# Patient Record
Sex: FEMALE | Race: WHITE | NOT HISPANIC OR LATINO | Employment: UNEMPLOYED | URBAN - METROPOLITAN AREA
[De-identification: names, ages, dates, MRNs, and addresses within clinical notes are randomized per-mention and may not be internally consistent; named-entity substitution may affect disease eponyms.]

---

## 2022-01-04 PROCEDURE — 87106 FUNGI IDENTIFICATION YEAST: CPT | Performed by: DENTIST

## 2022-01-04 PROCEDURE — 87205 SMEAR GRAM STAIN: CPT | Performed by: DENTIST

## 2022-01-05 ENCOUNTER — LAB REQUISITION (OUTPATIENT)
Dept: LAB | Facility: CLINIC | Age: 57
End: 2022-01-05

## 2022-01-05 DIAGNOSIS — R68.84 JAW PAIN: ICD-10-CM

## 2022-01-05 LAB
GRAM STAIN RESULT: NORMAL
GRAM STAIN RESULT: NORMAL

## 2022-01-08 LAB
BACTERIA SPEC CULT: ABNORMAL
BACTERIA SPEC CULT: ABNORMAL

## 2022-01-10 ENCOUNTER — HOSPITAL ENCOUNTER (OUTPATIENT)
Facility: CLINIC | Age: 57
Setting detail: OBSERVATION
Discharge: HOME | End: 2022-01-11
Attending: EMERGENCY MEDICINE | Admitting: DENTIST
Payer: COMMERCIAL

## 2022-01-10 ENCOUNTER — APPOINTMENT (OUTPATIENT)
Dept: CT IMAGING | Facility: CLINIC | Age: 57
End: 2022-01-10
Attending: EMERGENCY MEDICINE
Payer: COMMERCIAL

## 2022-01-10 ENCOUNTER — ANESTHESIA (OUTPATIENT)
Dept: SURGERY | Facility: CLINIC | Age: 57
End: 2022-01-10
Payer: COMMERCIAL

## 2022-01-10 ENCOUNTER — ANESTHESIA EVENT (OUTPATIENT)
Dept: SURGERY | Facility: CLINIC | Age: 57
End: 2022-01-10
Payer: COMMERCIAL

## 2022-01-10 DIAGNOSIS — Z98.890 STATUS POST INCISION AND DRAINAGE: Primary | ICD-10-CM

## 2022-01-10 DIAGNOSIS — Z20.822 LAB TEST NEGATIVE FOR COVID-19 VIRUS: ICD-10-CM

## 2022-01-10 DIAGNOSIS — M27.2 ABSCESS OF LEFT JAW: ICD-10-CM

## 2022-01-10 LAB
ALBUMIN SERPL-MCNC: 3 G/DL (ref 3.4–5)
ALP SERPL-CCNC: 107 U/L (ref 40–150)
ALT SERPL W P-5'-P-CCNC: 20 U/L (ref 0–50)
ANION GAP SERPL CALCULATED.3IONS-SCNC: 8 MMOL/L (ref 3–14)
AST SERPL W P-5'-P-CCNC: 14 U/L (ref 0–45)
BASOPHILS # BLD AUTO: 0 10E3/UL (ref 0–0.2)
BASOPHILS NFR BLD AUTO: 0 %
BILIRUB SERPL-MCNC: 0.3 MG/DL (ref 0.2–1.3)
BUN SERPL-MCNC: 7 MG/DL (ref 7–30)
CALCIUM SERPL-MCNC: 9.2 MG/DL (ref 8.5–10.1)
CHLORIDE BLD-SCNC: 99 MMOL/L (ref 94–109)
CO2 SERPL-SCNC: 20 MMOL/L (ref 20–32)
CREAT SERPL-MCNC: 0.47 MG/DL (ref 0.52–1.04)
CRP SERPL-MCNC: 110 MG/L (ref 0–8)
EOSINOPHIL # BLD AUTO: 0.1 10E3/UL (ref 0–0.7)
EOSINOPHIL NFR BLD AUTO: 1 %
ERYTHROCYTE [DISTWIDTH] IN BLOOD BY AUTOMATED COUNT: 13.4 % (ref 10–15)
GFR SERPL CREATININE-BSD FRML MDRD: >90 ML/MIN/1.73M2
GLUCOSE BLD-MCNC: 105 MG/DL (ref 70–99)
GLUCOSE BLDC GLUCOMTR-MCNC: 94 MG/DL (ref 70–99)
GRAM STAIN RESULT: ABNORMAL
GRAM STAIN RESULT: ABNORMAL
HCT VFR BLD AUTO: 44 % (ref 35–47)
HGB BLD-MCNC: 14.9 G/DL (ref 11.7–15.7)
HOLD SPECIMEN: NORMAL
HOLD SPECIMEN: NORMAL
IMM GRANULOCYTES # BLD: 0.1 10E3/UL
IMM GRANULOCYTES NFR BLD: 1 %
LACTATE SERPL-SCNC: 1.2 MMOL/L (ref 0.7–2)
LYMPHOCYTES # BLD AUTO: 0.9 10E3/UL (ref 0.8–5.3)
LYMPHOCYTES NFR BLD AUTO: 5 %
MCH RBC QN AUTO: 34.5 PG (ref 26.5–33)
MCHC RBC AUTO-ENTMCNC: 33.9 G/DL (ref 31.5–36.5)
MCV RBC AUTO: 102 FL (ref 78–100)
MONOCYTES # BLD AUTO: 1.2 10E3/UL (ref 0–1.3)
MONOCYTES NFR BLD AUTO: 7 %
NEUTROPHILS # BLD AUTO: 13.8 10E3/UL (ref 1.6–8.3)
NEUTROPHILS NFR BLD AUTO: 86 %
NRBC # BLD AUTO: 0 10E3/UL
NRBC BLD AUTO-RTO: 0 /100
PLATELET # BLD AUTO: 306 10E3/UL (ref 150–450)
POTASSIUM BLD-SCNC: 3.7 MMOL/L (ref 3.4–5.3)
PROT SERPL-MCNC: 7.9 G/DL (ref 6.8–8.8)
RADIOLOGIST FLAGS: ABNORMAL
RBC # BLD AUTO: 4.32 10E6/UL (ref 3.8–5.2)
SARS-COV-2 RNA RESP QL NAA+PROBE: NEGATIVE
SODIUM SERPL-SCNC: 127 MMOL/L (ref 133–144)
WBC # BLD AUTO: 16.1 10E3/UL (ref 4–11)

## 2022-01-10 PROCEDURE — 250N000025 HC SEVOFLURANE, PER MIN: Performed by: DENTIST

## 2022-01-10 PROCEDURE — 250N000013 HC RX MED GY IP 250 OP 250 PS 637: Performed by: ANESTHESIOLOGY

## 2022-01-10 PROCEDURE — 87077 CULTURE AEROBIC IDENTIFY: CPT | Performed by: DENTIST

## 2022-01-10 PROCEDURE — 99285 EMERGENCY DEPT VISIT HI MDM: CPT | Performed by: EMERGENCY MEDICINE

## 2022-01-10 PROCEDURE — 87102 FUNGUS ISOLATION CULTURE: CPT | Performed by: DENTIST

## 2022-01-10 PROCEDURE — 87205 SMEAR GRAM STAIN: CPT | Performed by: DENTIST

## 2022-01-10 PROCEDURE — 80053 COMPREHEN METABOLIC PANEL: CPT | Performed by: EMERGENCY MEDICINE

## 2022-01-10 PROCEDURE — 96375 TX/PRO/DX INJ NEW DRUG ADDON: CPT | Mod: 59 | Performed by: EMERGENCY MEDICINE

## 2022-01-10 PROCEDURE — 250N000009 HC RX 250: Performed by: NURSE ANESTHETIST, CERTIFIED REGISTERED

## 2022-01-10 PROCEDURE — 96361 HYDRATE IV INFUSION ADD-ON: CPT | Mod: 59 | Performed by: EMERGENCY MEDICINE

## 2022-01-10 PROCEDURE — 250N000011 HC RX IP 250 OP 636: Performed by: INTERNAL MEDICINE

## 2022-01-10 PROCEDURE — 96366 THER/PROPH/DIAG IV INF ADDON: CPT | Performed by: EMERGENCY MEDICINE

## 2022-01-10 PROCEDURE — 70491 CT SOFT TISSUE NECK W/DYE: CPT | Mod: 26 | Performed by: RADIOLOGY

## 2022-01-10 PROCEDURE — 99285 EMERGENCY DEPT VISIT HI MDM: CPT | Mod: 25 | Performed by: EMERGENCY MEDICINE

## 2022-01-10 PROCEDURE — 87070 CULTURE OTHR SPECIMN AEROBIC: CPT | Performed by: DENTIST

## 2022-01-10 PROCEDURE — 272N000001 HC OR GENERAL SUPPLY STERILE: Performed by: DENTIST

## 2022-01-10 PROCEDURE — 258N000003 HC RX IP 258 OP 636: Performed by: EMERGENCY MEDICINE

## 2022-01-10 PROCEDURE — 96365 THER/PROPH/DIAG IV INF INIT: CPT | Mod: 59 | Performed by: EMERGENCY MEDICINE

## 2022-01-10 PROCEDURE — 86140 C-REACTIVE PROTEIN: CPT | Performed by: EMERGENCY MEDICINE

## 2022-01-10 PROCEDURE — 36415 COLL VENOUS BLD VENIPUNCTURE: CPT | Performed by: EMERGENCY MEDICINE

## 2022-01-10 PROCEDURE — 93005 ELECTROCARDIOGRAM TRACING: CPT

## 2022-01-10 PROCEDURE — 70491 CT SOFT TISSUE NECK W/DYE: CPT

## 2022-01-10 PROCEDURE — 250N000011 HC RX IP 250 OP 636

## 2022-01-10 PROCEDURE — 710N000010 HC RECOVERY PHASE 1, LEVEL 2, PER MIN: Performed by: DENTIST

## 2022-01-10 PROCEDURE — 250N000011 HC RX IP 250 OP 636: Performed by: EMERGENCY MEDICINE

## 2022-01-10 PROCEDURE — 360N000075 HC SURGERY LEVEL 2, PER MIN: Performed by: DENTIST

## 2022-01-10 PROCEDURE — 999N000054 HC STATISTIC EKG NON-CHARGEABLE

## 2022-01-10 PROCEDURE — 99220 PR INITIAL OBSERVATION CARE,LEVEL III: CPT | Mod: AI | Performed by: FAMILY MEDICINE

## 2022-01-10 PROCEDURE — 87185 SC STD ENZYME DETCJ PER NZM: CPT | Performed by: DENTIST

## 2022-01-10 PROCEDURE — 370N000017 HC ANESTHESIA TECHNICAL FEE, PER MIN: Performed by: DENTIST

## 2022-01-10 PROCEDURE — 250N000011 HC RX IP 250 OP 636: Performed by: ANESTHESIOLOGY

## 2022-01-10 PROCEDURE — 83605 ASSAY OF LACTIC ACID: CPT | Performed by: EMERGENCY MEDICINE

## 2022-01-10 PROCEDURE — 93010 ELECTROCARDIOGRAM REPORT: CPT | Mod: 59 | Performed by: INTERNAL MEDICINE

## 2022-01-10 PROCEDURE — 96376 TX/PRO/DX INJ SAME DRUG ADON: CPT | Performed by: EMERGENCY MEDICINE

## 2022-01-10 PROCEDURE — U0003 INFECTIOUS AGENT DETECTION BY NUCLEIC ACID (DNA OR RNA); SEVERE ACUTE RESPIRATORY SYNDROME CORONAVIRUS 2 (SARS-COV-2) (CORONAVIRUS DISEASE [COVID-19]), AMPLIFIED PROBE TECHNIQUE, MAKING USE OF HIGH THROUGHPUT TECHNOLOGIES AS DESCRIBED BY CMS-2020-01-R: HCPCS | Performed by: EMERGENCY MEDICINE

## 2022-01-10 PROCEDURE — 87040 BLOOD CULTURE FOR BACTERIA: CPT | Mod: XS | Performed by: EMERGENCY MEDICINE

## 2022-01-10 PROCEDURE — C9803 HOPD COVID-19 SPEC COLLECT: HCPCS | Performed by: EMERGENCY MEDICINE

## 2022-01-10 PROCEDURE — 250N000011 HC RX IP 250 OP 636: Performed by: NURSE ANESTHETIST, CERTIFIED REGISTERED

## 2022-01-10 PROCEDURE — 999N000141 HC STATISTIC PRE-PROCEDURE NURSING ASSESSMENT: Performed by: DENTIST

## 2022-01-10 PROCEDURE — 120N000002 HC R&B MED SURG/OB UMMC

## 2022-01-10 PROCEDURE — 85025 COMPLETE CBC W/AUTO DIFF WBC: CPT | Performed by: EMERGENCY MEDICINE

## 2022-01-10 PROCEDURE — 82040 ASSAY OF SERUM ALBUMIN: CPT | Performed by: EMERGENCY MEDICINE

## 2022-01-10 PROCEDURE — 250N000009 HC RX 250: Performed by: DENTIST

## 2022-01-10 PROCEDURE — 258N000003 HC RX IP 258 OP 636: Performed by: NURSE ANESTHETIST, CERTIFIED REGISTERED

## 2022-01-10 RX ORDER — CEPHALEXIN 500 MG/1
500 CAPSULE ORAL 4 TIMES DAILY
Status: ON HOLD | COMMUNITY
End: 2022-01-11

## 2022-01-10 RX ORDER — IOPAMIDOL 755 MG/ML
100 INJECTION, SOLUTION INTRAVASCULAR ONCE
Status: COMPLETED | OUTPATIENT
Start: 2022-01-10 | End: 2022-01-10

## 2022-01-10 RX ORDER — SODIUM CHLORIDE 9 MG/ML
INJECTION, SOLUTION INTRAVENOUS CONTINUOUS
Status: DISCONTINUED | OUTPATIENT
Start: 2022-01-10 | End: 2022-01-11

## 2022-01-10 RX ORDER — PROPOFOL 10 MG/ML
INJECTION, EMULSION INTRAVENOUS PRN
Status: DISCONTINUED | OUTPATIENT
Start: 2022-01-10 | End: 2022-01-10

## 2022-01-10 RX ORDER — ONDANSETRON 2 MG/ML
4 INJECTION INTRAMUSCULAR; INTRAVENOUS EVERY 30 MIN PRN
Status: DISCONTINUED | OUTPATIENT
Start: 2022-01-10 | End: 2022-01-10

## 2022-01-10 RX ORDER — AMOXICILLIN 250 MG
2 CAPSULE ORAL 2 TIMES DAILY PRN
Status: DISCONTINUED | OUTPATIENT
Start: 2022-01-10 | End: 2022-01-11 | Stop reason: HOSPADM

## 2022-01-10 RX ORDER — ACETAMINOPHEN 325 MG/1
975 TABLET ORAL ONCE
Status: COMPLETED | OUTPATIENT
Start: 2022-01-10 | End: 2022-01-10

## 2022-01-10 RX ORDER — FENTANYL CITRATE 50 UG/ML
25 INJECTION, SOLUTION INTRAMUSCULAR; INTRAVENOUS EVERY 5 MIN PRN
Status: DISCONTINUED | OUTPATIENT
Start: 2022-01-10 | End: 2022-01-10 | Stop reason: HOSPADM

## 2022-01-10 RX ORDER — ONDANSETRON 4 MG/1
4 TABLET, ORALLY DISINTEGRATING ORAL EVERY 6 HOURS PRN
Status: DISCONTINUED | OUTPATIENT
Start: 2022-01-10 | End: 2022-01-11 | Stop reason: HOSPADM

## 2022-01-10 RX ORDER — SODIUM CHLORIDE, SODIUM LACTATE, POTASSIUM CHLORIDE, CALCIUM CHLORIDE 600; 310; 30; 20 MG/100ML; MG/100ML; MG/100ML; MG/100ML
INJECTION, SOLUTION INTRAVENOUS CONTINUOUS
Status: DISCONTINUED | OUTPATIENT
Start: 2022-01-10 | End: 2022-01-10 | Stop reason: HOSPADM

## 2022-01-10 RX ORDER — FLUCONAZOLE 100 MG/1
100 TABLET ORAL DAILY
Status: DISCONTINUED | OUTPATIENT
Start: 2022-01-10 | End: 2022-01-11 | Stop reason: HOSPADM

## 2022-01-10 RX ORDER — ONDANSETRON 2 MG/ML
4 INJECTION INTRAMUSCULAR; INTRAVENOUS EVERY 30 MIN PRN
Status: DISCONTINUED | OUTPATIENT
Start: 2022-01-10 | End: 2022-01-10 | Stop reason: HOSPADM

## 2022-01-10 RX ORDER — HYDROMORPHONE HYDROCHLORIDE 1 MG/ML
0.5 INJECTION, SOLUTION INTRAMUSCULAR; INTRAVENOUS; SUBCUTANEOUS
Status: DISCONTINUED | OUTPATIENT
Start: 2022-01-10 | End: 2022-01-11 | Stop reason: HOSPADM

## 2022-01-10 RX ORDER — PAROXETINE 20 MG/1
20 TABLET, FILM COATED ORAL DAILY
Status: ON HOLD | COMMUNITY
Start: 2021-12-23 | End: 2022-12-02

## 2022-01-10 RX ORDER — ESMOLOL HYDROCHLORIDE 10 MG/ML
INJECTION INTRAVENOUS PRN
Status: DISCONTINUED | OUTPATIENT
Start: 2022-01-10 | End: 2022-01-10

## 2022-01-10 RX ORDER — LEVOTHYROXINE SODIUM 112 UG/1
112 TABLET ORAL DAILY
Status: DISCONTINUED | OUTPATIENT
Start: 2022-01-11 | End: 2022-01-11 | Stop reason: HOSPADM

## 2022-01-10 RX ORDER — OXYCODONE HYDROCHLORIDE 5 MG/1
5 TABLET ORAL EVERY 4 HOURS PRN
Status: DISCONTINUED | OUTPATIENT
Start: 2022-01-10 | End: 2022-01-10

## 2022-01-10 RX ORDER — ONDANSETRON 4 MG/1
4 TABLET, ORALLY DISINTEGRATING ORAL EVERY 30 MIN PRN
Status: DISCONTINUED | OUTPATIENT
Start: 2022-01-10 | End: 2022-01-10 | Stop reason: HOSPADM

## 2022-01-10 RX ORDER — HYDROMORPHONE HYDROCHLORIDE 1 MG/ML
0.5 INJECTION, SOLUTION INTRAMUSCULAR; INTRAVENOUS; SUBCUTANEOUS ONCE
Status: COMPLETED | OUTPATIENT
Start: 2022-01-10 | End: 2022-01-10

## 2022-01-10 RX ORDER — ACETAMINOPHEN 325 MG/1
975 TABLET ORAL EVERY 8 HOURS PRN
Status: DISCONTINUED | OUTPATIENT
Start: 2022-01-10 | End: 2022-01-10

## 2022-01-10 RX ORDER — PAROXETINE 10 MG/1
10 TABLET, FILM COATED ORAL DAILY
Status: DISCONTINUED | OUTPATIENT
Start: 2022-01-10 | End: 2022-01-11 | Stop reason: HOSPADM

## 2022-01-10 RX ORDER — HYDROMORPHONE HYDROCHLORIDE 1 MG/ML
0.5 INJECTION, SOLUTION INTRAMUSCULAR; INTRAVENOUS; SUBCUTANEOUS
Status: COMPLETED | OUTPATIENT
Start: 2022-01-10 | End: 2022-01-10

## 2022-01-10 RX ORDER — FLUCONAZOLE 100 MG/1
100 TABLET ORAL DAILY
Status: ON HOLD | COMMUNITY
End: 2022-01-11

## 2022-01-10 RX ORDER — SODIUM CHLORIDE, SODIUM LACTATE, POTASSIUM CHLORIDE, CALCIUM CHLORIDE 600; 310; 30; 20 MG/100ML; MG/100ML; MG/100ML; MG/100ML
INJECTION, SOLUTION INTRAVENOUS CONTINUOUS PRN
Status: DISCONTINUED | OUTPATIENT
Start: 2022-01-10 | End: 2022-01-10

## 2022-01-10 RX ORDER — ATENOLOL 25 MG/1
50 TABLET ORAL DAILY
Status: DISCONTINUED | OUTPATIENT
Start: 2022-01-10 | End: 2022-01-11 | Stop reason: HOSPADM

## 2022-01-10 RX ORDER — IBUPROFEN 200 MG
200 TABLET ORAL EVERY 4 HOURS PRN
COMMUNITY
End: 2022-04-02

## 2022-01-10 RX ORDER — ONDANSETRON 2 MG/ML
4 INJECTION INTRAMUSCULAR; INTRAVENOUS EVERY 6 HOURS PRN
Status: DISCONTINUED | OUTPATIENT
Start: 2022-01-10 | End: 2022-01-11 | Stop reason: HOSPADM

## 2022-01-10 RX ORDER — NICOTINE 21 MG/24HR
1 PATCH, TRANSDERMAL 24 HOURS TRANSDERMAL DAILY
Status: DISCONTINUED | OUTPATIENT
Start: 2022-01-10 | End: 2022-01-11 | Stop reason: HOSPADM

## 2022-01-10 RX ORDER — BUPIVACAINE HYDROCHLORIDE AND EPINEPHRINE 5; 5 MG/ML; UG/ML
INJECTION, SOLUTION PERINEURAL PRN
Status: DISCONTINUED | OUTPATIENT
Start: 2022-01-10 | End: 2022-01-10 | Stop reason: HOSPADM

## 2022-01-10 RX ORDER — CLINDAMYCIN PHOSPHATE 900 MG/50ML
900 INJECTION, SOLUTION INTRAVENOUS EVERY 8 HOURS
Status: DISCONTINUED | OUTPATIENT
Start: 2022-01-10 | End: 2022-01-11 | Stop reason: HOSPADM

## 2022-01-10 RX ORDER — FENTANYL CITRATE 50 UG/ML
INJECTION, SOLUTION INTRAMUSCULAR; INTRAVENOUS PRN
Status: DISCONTINUED | OUTPATIENT
Start: 2022-01-10 | End: 2022-01-10

## 2022-01-10 RX ORDER — AMOXICILLIN 250 MG
1 CAPSULE ORAL 2 TIMES DAILY PRN
Status: DISCONTINUED | OUTPATIENT
Start: 2022-01-10 | End: 2022-01-11 | Stop reason: HOSPADM

## 2022-01-10 RX ORDER — LIDOCAINE 40 MG/G
CREAM TOPICAL
Status: DISCONTINUED | OUTPATIENT
Start: 2022-01-10 | End: 2022-01-11 | Stop reason: HOSPADM

## 2022-01-10 RX ORDER — DEXAMETHASONE SODIUM PHOSPHATE 4 MG/ML
4 INJECTION, SOLUTION INTRA-ARTICULAR; INTRALESIONAL; INTRAMUSCULAR; INTRAVENOUS; SOFT TISSUE EVERY 6 HOURS
Status: DISCONTINUED | OUTPATIENT
Start: 2022-01-10 | End: 2022-01-11 | Stop reason: HOSPADM

## 2022-01-10 RX ORDER — HYDROMORPHONE HCL IN WATER/PF 6 MG/30 ML
0.2 PATIENT CONTROLLED ANALGESIA SYRINGE INTRAVENOUS EVERY 5 MIN PRN
Status: DISCONTINUED | OUTPATIENT
Start: 2022-01-10 | End: 2022-01-10 | Stop reason: HOSPADM

## 2022-01-10 RX ORDER — METRONIDAZOLE 500 MG/1
500 TABLET ORAL 4 TIMES DAILY
Status: ON HOLD | COMMUNITY
End: 2022-01-11

## 2022-01-10 RX ORDER — ONDANSETRON 2 MG/ML
INJECTION INTRAMUSCULAR; INTRAVENOUS PRN
Status: DISCONTINUED | OUTPATIENT
Start: 2022-01-10 | End: 2022-01-10

## 2022-01-10 RX ORDER — LEVOTHYROXINE SODIUM 112 UG/1
112 TABLET ORAL DAILY
COMMUNITY

## 2022-01-10 RX ORDER — FENTANYL CITRATE 50 UG/ML
25 INJECTION, SOLUTION INTRAMUSCULAR; INTRAVENOUS
Status: DISCONTINUED | OUTPATIENT
Start: 2022-01-10 | End: 2022-01-10 | Stop reason: HOSPADM

## 2022-01-10 RX ORDER — DEXAMETHASONE SODIUM PHOSPHATE 4 MG/ML
INJECTION, SOLUTION INTRA-ARTICULAR; INTRALESIONAL; INTRAMUSCULAR; INTRAVENOUS; SOFT TISSUE PRN
Status: DISCONTINUED | OUTPATIENT
Start: 2022-01-10 | End: 2022-01-10

## 2022-01-10 RX ORDER — OXYCODONE HYDROCHLORIDE 5 MG/1
5 TABLET ORAL EVERY 4 HOURS PRN
Status: DISCONTINUED | OUTPATIENT
Start: 2022-01-10 | End: 2022-01-10 | Stop reason: HOSPADM

## 2022-01-10 RX ORDER — ACETAMINOPHEN 325 MG/1
975 TABLET ORAL EVERY 8 HOURS
Status: DISCONTINUED | OUTPATIENT
Start: 2022-01-11 | End: 2022-01-11

## 2022-01-10 RX ORDER — LIDOCAINE HYDROCHLORIDE 20 MG/ML
INJECTION, SOLUTION INFILTRATION; PERINEURAL PRN
Status: DISCONTINUED | OUTPATIENT
Start: 2022-01-10 | End: 2022-01-10

## 2022-01-10 RX ORDER — ACETAMINOPHEN 500 MG
500-1000 TABLET ORAL EVERY 6 HOURS PRN
COMMUNITY
End: 2022-04-02

## 2022-01-10 RX ORDER — LIDOCAINE 40 MG/G
CREAM TOPICAL
Status: DISCONTINUED | OUTPATIENT
Start: 2022-01-10 | End: 2022-01-10 | Stop reason: HOSPADM

## 2022-01-10 RX ORDER — ATORVASTATIN CALCIUM 20 MG/1
20 TABLET, FILM COATED ORAL DAILY
Status: DISCONTINUED | OUTPATIENT
Start: 2022-01-10 | End: 2022-01-11 | Stop reason: HOSPADM

## 2022-01-10 RX ORDER — OXYCODONE HYDROCHLORIDE 5 MG/1
5 TABLET ORAL EVERY 4 HOURS PRN
Status: DISCONTINUED | OUTPATIENT
Start: 2022-01-10 | End: 2022-01-11 | Stop reason: HOSPADM

## 2022-01-10 RX ORDER — MEPERIDINE HYDROCHLORIDE 25 MG/ML
12.5 INJECTION INTRAMUSCULAR; INTRAVENOUS; SUBCUTANEOUS
Status: DISCONTINUED | OUTPATIENT
Start: 2022-01-10 | End: 2022-01-10 | Stop reason: HOSPADM

## 2022-01-10 RX ORDER — ATORVASTATIN CALCIUM 20 MG/1
20 TABLET, FILM COATED ORAL DAILY
COMMUNITY

## 2022-01-10 RX ORDER — ATENOLOL 50 MG/1
50 TABLET ORAL DAILY
Status: ON HOLD | COMMUNITY
End: 2022-04-02

## 2022-01-10 RX ADMIN — CLINDAMYCIN IN 5 PERCENT DEXTROSE 900 MG: 18 INJECTION, SOLUTION INTRAVENOUS at 14:33

## 2022-01-10 RX ADMIN — DEXAMETHASONE SODIUM PHOSPHATE 8 MG: 4 INJECTION, SOLUTION INTRA-ARTICULAR; INTRALESIONAL; INTRAMUSCULAR; INTRAVENOUS; SOFT TISSUE at 18:43

## 2022-01-10 RX ADMIN — CLINDAMYCIN IN 5 PERCENT DEXTROSE 900 MG: 18 INJECTION, SOLUTION INTRAVENOUS at 06:02

## 2022-01-10 RX ADMIN — ONDANSETRON 4 MG: 2 INJECTION INTRAMUSCULAR; INTRAVENOUS at 06:03

## 2022-01-10 RX ADMIN — SODIUM CHLORIDE: 9 INJECTION, SOLUTION INTRAVENOUS at 09:08

## 2022-01-10 RX ADMIN — PHENYLEPHRINE HYDROCHLORIDE 100 MCG: 10 INJECTION INTRAVENOUS at 18:44

## 2022-01-10 RX ADMIN — ROCURONIUM BROMIDE 50 MG: 50 INJECTION, SOLUTION INTRAVENOUS at 18:28

## 2022-01-10 RX ADMIN — FENTANYL CITRATE 25 MCG: 50 INJECTION INTRAMUSCULAR; INTRAVENOUS at 19:38

## 2022-01-10 RX ADMIN — LIDOCAINE HYDROCHLORIDE 100 MG: 20 INJECTION, SOLUTION INFILTRATION; PERINEURAL at 18:27

## 2022-01-10 RX ADMIN — FENTANYL CITRATE 25 MCG: 50 INJECTION INTRAMUSCULAR; INTRAVENOUS at 19:33

## 2022-01-10 RX ADMIN — ONDANSETRON 4 MG: 2 INJECTION INTRAMUSCULAR; INTRAVENOUS at 18:55

## 2022-01-10 RX ADMIN — HYDROMORPHONE HYDROCHLORIDE 0.5 MG: 1 INJECTION, SOLUTION INTRAMUSCULAR; INTRAVENOUS; SUBCUTANEOUS at 16:31

## 2022-01-10 RX ADMIN — SUGAMMADEX 200 MG: 100 INJECTION, SOLUTION INTRAVENOUS at 18:59

## 2022-01-10 RX ADMIN — HYDROMORPHONE HYDROCHLORIDE 0.5 MG: 1 INJECTION, SOLUTION INTRAMUSCULAR; INTRAVENOUS; SUBCUTANEOUS at 06:03

## 2022-01-10 RX ADMIN — HYDROMORPHONE HYDROCHLORIDE 0.5 MG: 1 INJECTION, SOLUTION INTRAMUSCULAR; INTRAVENOUS; SUBCUTANEOUS at 09:05

## 2022-01-10 RX ADMIN — SODIUM CHLORIDE, POTASSIUM CHLORIDE, SODIUM LACTATE AND CALCIUM CHLORIDE: 600; 310; 30; 20 INJECTION, SOLUTION INTRAVENOUS at 18:59

## 2022-01-10 RX ADMIN — PROPOFOL 150 MG: 10 INJECTION, EMULSION INTRAVENOUS at 18:27

## 2022-01-10 RX ADMIN — HYDROMORPHONE HYDROCHLORIDE 0.5 MG: 1 INJECTION, SOLUTION INTRAMUSCULAR; INTRAVENOUS; SUBCUTANEOUS at 13:01

## 2022-01-10 RX ADMIN — PROPOFOL 50 MG: 10 INJECTION, EMULSION INTRAVENOUS at 18:28

## 2022-01-10 RX ADMIN — ACETAMINOPHEN 975 MG: 325 TABLET, FILM COATED ORAL at 18:09

## 2022-01-10 RX ADMIN — SODIUM CHLORIDE 1000 ML: 9 INJECTION, SOLUTION INTRAVENOUS at 06:01

## 2022-01-10 RX ADMIN — FENTANYL CITRATE 100 MCG: 50 INJECTION, SOLUTION INTRAMUSCULAR; INTRAVENOUS at 18:24

## 2022-01-10 RX ADMIN — SODIUM CHLORIDE, POTASSIUM CHLORIDE, SODIUM LACTATE AND CALCIUM CHLORIDE: 600; 310; 30; 20 INJECTION, SOLUTION INTRAVENOUS at 18:17

## 2022-01-10 RX ADMIN — IOPAMIDOL 100 ML: 755 INJECTION, SOLUTION INTRAVENOUS at 08:22

## 2022-01-10 RX ADMIN — MIDAZOLAM 2 MG: 1 INJECTION INTRAMUSCULAR; INTRAVENOUS at 18:17

## 2022-01-10 RX ADMIN — PHENYLEPHRINE HYDROCHLORIDE 100 MCG: 10 INJECTION INTRAVENOUS at 18:37

## 2022-01-10 RX ADMIN — ESMOLOL HYDROCHLORIDE 20 MG: 10 INJECTION, SOLUTION INTRAVENOUS at 18:32

## 2022-01-10 RX ADMIN — FENTANYL CITRATE 50 MCG: 50 INJECTION, SOLUTION INTRAMUSCULAR; INTRAVENOUS at 19:14

## 2022-01-10 RX ADMIN — CLINDAMYCIN IN 5 PERCENT DEXTROSE 900 MG: 18 INJECTION, SOLUTION INTRAVENOUS at 22:11

## 2022-01-10 ASSESSMENT — ENCOUNTER SYMPTOMS
NECK STIFFNESS: 0
SINUS PRESSURE: 0
SINUS PAIN: 0
RHINORRHEA: 0
DIFFICULTY URINATING: 0
FEVER: 0
SHORTNESS OF BREATH: 0
ABDOMINAL PAIN: 0
VOMITING: 0
DIAPHORESIS: 0
FACIAL SWELLING: 1
CONSTIPATION: 0
TROUBLE SWALLOWING: 1
SORE THROAT: 1
ADENOPATHY: 0
NAUSEA: 0
DIARRHEA: 0
VOICE CHANGE: 0
HEADACHES: 0
COLOR CHANGE: 1
DYSURIA: 0
COUGH: 0
EYE PAIN: 0
NERVOUS/ANXIOUS: 1
DIZZINESS: 0
APPETITE CHANGE: 1
LIGHT-HEADEDNESS: 0
SORE THROAT: 0
PALPITATIONS: 0
NUMBNESS: 0
CHEST TIGHTNESS: 0
CHILLS: 0
NECK PAIN: 0

## 2022-01-10 ASSESSMENT — ACTIVITIES OF DAILY LIVING (ADL)
ADLS_ACUITY_SCORE: 4

## 2022-01-10 ASSESSMENT — MIFFLIN-ST. JEOR
SCORE: 1170
SCORE: 1150.59

## 2022-01-10 NOTE — CONSULTS
ORAL & MAXILLOFACIAL SURGERY   CONSULT  Lara Melendez,  MRN: [id number],  : 1965        ASSESSMENT   56 year old female presents after tooth #17 extraction on 21 at an outside office. She had a subsequent infection that was drained intraorally around 21. She continued to have pain and swelling, drain was removed on 22 and over the weekend patient had increased swelling and pain. Today she presents was submandibular abscess indicated for drainage in the OR.       PLAN  - Admit to medicine  - OR for extraoral incision and drainage  - IV Clindamycin   - IV decadron    Please contact the OMFS resident on-call with questions or concerns.    Discussed with chief resident and staff.    Nicole Wang MD  Oral & Maxillofacial Surgery, PGY-2    ____________________________________________      HPI  56 year old female presents after tooth #17 extraction on 21 at an outside office. She had a subsequent infection that was drained intraorally around 21. She continued to have pain and swelling, drain was removed on 22 and over the weekend patient had increased swelling and pain. Today she presents was submandibular abscess indicated for drainage in the OR.     HISTORY  Past Medical History:   Past Medical History:   Diagnosis Date     Anxiety      Elevated cholesterol      Hypertension      Thyroid disease      Past Surgical History:   Past Surgical History:   Procedure Laterality Date     CHOLECYSTECTOMY       ENT SURGERY      tooth extraction     Medications:   No current facility-administered medications on file prior to encounter.  atenolol (TENORMIN) 50 MG tablet, Take 50 mg by mouth daily  atorvastatin (LIPITOR) 20 MG tablet, Take 20 mg by mouth daily  cephALEXin (KEFLEX) 500 MG capsule, Take 500 mg by mouth 2 times daily  fluconazole (DIFLUCAN) 100 MG tablet, Take 100 mg by mouth daily  levothyroxine (SYNTHROID/LEVOTHROID) 112 MCG tablet, Take 112 mcg by mouth  "daily  metroNIDAZOLE (FLAGYL) 500 MG tablet, Take 500 mg by mouth 4 times daily  oxyCODONE (ROXICODONE-INTENSOL) 20 mg/mL CONC (HIGH CONC) solution, Take 5 mg by mouth every 6 hours as needed for moderate to severe pain  PARoxetine HCl (PAXIL PO), Take 10 mg by mouth daily         clindamycin  900 mg Intravenous Q8H     iopamidol (ISOVUE-370)  100 mL Intravenous Once     sodium chloride (PF)  60 mL Intravenous Once     Allergies:   Allergies   Allergen Reactions     Sulfa Drugs      Penicillins Rash     Social History:   Social History     Socioeconomic History     Marital status:      Spouse name: Not on file     Number of children: Not on file     Years of education: Not on file     Highest education level: Not on file   Occupational History     Not on file   Tobacco Use     Smoking status: Current Every Day Smoker     Smokeless tobacco: Never Used   Substance and Sexual Activity     Alcohol use: Yes     Drug use: Never     Sexual activity: Not on file   Other Topics Concern     Not on file   Social History Narrative     Not on file     Social Determinants of Health     Financial Resource Strain: Not on file   Food Insecurity: Not on file   Transportation Needs: Not on file   Physical Activity: Not on file   Stress: Not on file   Social Connections: Not on file   Intimate Partner Violence: Not on file   Housing Stability: Not on file       REVIEW OF SYSTEMS  10 point ROS reviewed and negative aside from listed in HPI    PHYSICAL EXAM  Vital Signs:   Vitals:    01/10/22 0513 01/10/22 0544   BP: (!) 150/84 (!) 139/94   Pulse: 97 95   Resp: 16    Temp: 98.8  F (37.1  C)    TempSrc: Oral    SpO2: 97% 97%   Weight: 58.6 kg (129 lb 1.6 oz)    Height: 1.626 m (5' 4\")        GEN: WD/WN female, NAD  HEENT: NC/AT, EOMI, PERRL, significant left submandibular and sublingual swelling, crosses midline, unable to palpated entire inferior border of mandible, not pus noted intraorally, no uvula deviation, JOSE ARMANDO 25mm  PULM: " CTAB, breathing comfortably on room air  GI: Soft, ND/NT  MSK: GREEN, no peripheral extremity edema  NEURO: AAOx4, CN II-XII intact bilaterally  PSYCH: Appropriate mood and affect            REVIEW OF LABORATORY DATA  Most Recent 3 CBC's:  Recent Labs   Lab Test 01/10/22  0547   WBC 16.1*   HGB 14.9   *         Most Recent 3 BMP's:  Recent Labs   Lab Test 01/10/22  0547   *   POTASSIUM 3.7   CHLORIDE 99   CO2 20   BUN 7   CR 0.47*   ANIONGAP 8   EDIN 9.2   *     Most Recent 2 LFT's:  Recent Labs   Lab Test 01/10/22  0547   AST 14   ALT 20   ALKPHOS 107   BILITOTAL 0.3     Most Recent INR's and Anticoagulation Dosing History:  Anticoagulation Dose History    There is no flowsheet data to display.       Most Recent 3 Troponin's:No lab results found.  Most Recent Cholesterol Panel:No lab results found.  Most Recent 6 Bacteria Isolates From Any Culture (See EPIC Reports for Culture Details):No lab results found.  Most Recent TSH, T4 and A1c Labs:No lab results found.    IMAGING RESULTS (Include outside hospital results)     Independently reviewed    Soft Tissue Head CT from OSH   Large left sided submandibular, sublingual and lateral pharyngeal space abscess

## 2022-01-10 NOTE — ED PROVIDER NOTES
ED Provider Note  [facility]      History     Chief Complaint   Patient presents with     Post-op Problem     HPI  Lara Melendez is a 56 year old female who presents to us with a chief complaint of jaw swelling and pain.  She had a tooth extraction in her left lower jaw about 2 weeks ago.  Since then she has developed increasing pain as well as swelling in the area.  Her oral surgeon had started her on Keflex for this followed by Flagyl and Diflucan.  Pain is worsening and the swelling continues to increase.  No fevers.  No nausea or vomiting.    Past Medical History  Past Medical History:   Diagnosis Date     Anxiety      Elevated cholesterol      Hypertension      Thyroid disease      Past Surgical History:   Procedure Laterality Date     CHOLECYSTECTOMY       ENT SURGERY      tooth extraction     atenolol (TENORMIN) 50 MG tablet  atorvastatin (LIPITOR) 20 MG tablet  cephALEXin (KEFLEX) 500 MG capsule  fluconazole (DIFLUCAN) 100 MG tablet  levothyroxine (SYNTHROID/LEVOTHROID) 112 MCG tablet  metroNIDAZOLE (FLAGYL) 500 MG tablet  oxyCODONE (ROXICODONE-INTENSOL) 20 mg/mL CONC (HIGH CONC) solution  PARoxetine HCl (PAXIL PO)      Allergies   Allergen Reactions     Sulfa Drugs      Penicillins Rash     Family History  History reviewed. No pertinent family history.  Social History   Social History     Tobacco Use     Smoking status: Current Every Day Smoker     Smokeless tobacco: Never Used   Substance Use Topics     Alcohol use: Yes     Drug use: Never      Past medical history, past surgical history, medications, allergies, family history, and social history were reviewed with the patient. No additional pertinent items.       Review of Systems   Constitutional: Negative for chills, diaphoresis and fever.   HENT: Negative for congestion and sore throat.    Eyes: Negative for visual disturbance.   Respiratory: Negative for cough, chest tightness and shortness of breath.    Cardiovascular:  "Negative for chest pain and palpitations.   Gastrointestinal: Negative for abdominal pain, diarrhea, nausea and vomiting.   Genitourinary: Negative for difficulty urinating and dysuria.   Neurological: Negative for dizziness and syncope.   Psychiatric/Behavioral: Negative for behavioral problems and suicidal ideas.     A complete review of systems was performed with pertinent positives and negatives noted in the HPI, and all other systems negative.    Physical Exam   BP: (!) 150/84  Pulse: 97  Temp: 98.8  F (37.1  C)  Resp: 16  Height: 162.6 cm (5' 4\")  Weight: 58.6 kg (129 lb 1.6 oz)  SpO2: 97 %  Physical Exam  Vitals and nursing note reviewed.   Constitutional:       General: She is not in acute distress.     Appearance: She is well-developed.   HENT:      Head: Normocephalic.      Mouth/Throat:      Comments: Left jaw area as well as left submandibular area swelling with induration and severe tenderness  Eyes:      Extraocular Movements: Extraocular movements intact.   Pulmonary:      Effort: No respiratory distress.   Abdominal:      General: There is no distension.   Musculoskeletal:         General: No deformity.      Cervical back: Neck supple.   Skin:     General: Skin is dry.   Neurological:      Mental Status: She is alert.      Comments: alert   Psychiatric:         Behavior: Behavior normal.         ED Course      Procedures                Results for orders placed or performed during the hospital encounter of 01/10/22   Comprehensive metabolic panel     Status: Abnormal   Result Value Ref Range    Sodium 127 (L) 133 - 144 mmol/L    Potassium 3.7 3.4 - 5.3 mmol/L    Chloride 99 94 - 109 mmol/L    Carbon Dioxide (CO2) 20 20 - 32 mmol/L    Anion Gap 8 3 - 14 mmol/L    Urea Nitrogen 7 7 - 30 mg/dL    Creatinine 0.47 (L) 0.52 - 1.04 mg/dL    Calcium 9.2 8.5 - 10.1 mg/dL    Glucose 105 (H) 70 - 99 mg/dL    Alkaline Phosphatase 107 40 - 150 U/L    AST 14 0 - 45 U/L    ALT 20 0 - 50 U/L    Protein Total 7.9 6.8 " - 8.8 g/dL    Albumin 3.0 (L) 3.4 - 5.0 g/dL    Bilirubin Total 0.3 0.2 - 1.3 mg/dL    GFR Estimate >90 >60 mL/min/1.73m2   Lactic acid whole blood     Status: Normal   Result Value Ref Range    Lactic Acid 1.2 0.7 - 2.0 mmol/L   CRP inflammation     Status: Abnormal   Result Value Ref Range    CRP Inflammation 110.0 (H) 0.0 - 8.0 mg/L   CBC with platelets and differential     Status: Abnormal   Result Value Ref Range    WBC Count 16.1 (H) 4.0 - 11.0 10e3/uL    RBC Count 4.32 3.80 - 5.20 10e6/uL    Hemoglobin 14.9 11.7 - 15.7 g/dL    Hematocrit 44.0 35.0 - 47.0 %     (H) 78 - 100 fL    MCH 34.5 (H) 26.5 - 33.0 pg    MCHC 33.9 31.5 - 36.5 g/dL    RDW 13.4 10.0 - 15.0 %    Platelet Count 306 150 - 450 10e3/uL    % Neutrophils 86 %    % Lymphocytes 5 %    % Monocytes 7 %    % Eosinophils 1 %    % Basophils 0 %    % Immature Granulocytes 1 %    NRBCs per 100 WBC 0 <1 /100    Absolute Neutrophils 13.8 (H) 1.6 - 8.3 10e3/uL    Absolute Lymphocytes 0.9 0.8 - 5.3 10e3/uL    Absolute Monocytes 1.2 0.0 - 1.3 10e3/uL    Absolute Eosinophils 0.1 0.0 - 0.7 10e3/uL    Absolute Basophils 0.0 0.0 - 0.2 10e3/uL    Absolute Immature Granulocytes 0.1 <=0.4 10e3/uL    Absolute NRBCs 0.0 10e3/uL   Extra Blue Top Tube     Status: None   Result Value Ref Range    Hold Specimen JIC    Extra Red Top Tube     Status: None   Result Value Ref Range    Hold Specimen JIC    CBC with platelets differential     Status: Abnormal    Narrative    The following orders were created for panel order CBC with platelets differential.  Procedure                               Abnormality         Status                     ---------                               -----------         ------                     CBC with platelets and d...[[id number]]  Abnormal            Final result                 Please view results for these tests on the individual orders.   [place] Draw     Status: None    Narrative    The following orders were created for panel order  [place] Draw.  Procedure                               Abnormality         Status                     ---------                               -----------         ------                     Extra Blue Top Tube[[id number]]                              Final result               Extra Red Top Tube[[id number]]                               Final result                 Please view results for these tests on the individual orders.     Medications   0.9% sodium chloride BOLUS (1,000 mLs Intravenous New Bag 1/10/22 0601)     Followed by   sodium chloride 0.9% infusion (has no administration in time range)   HYDROmorphone (PF) (DILAUDID) injection 0.5 mg (0.5 mg Intravenous Given 1/10/22 0603)   ondansetron (ZOFRAN) injection 4 mg (4 mg Intravenous Given 1/10/22 0603)   clindamycin (CLEOCIN) infusion 900 mg (0 mg Intravenous Stopped 1/10/22 0658)        Assessments & Plan (with Medical Decision Making)   56-year-old female presents to us with a chief complaint of left jaw swelling and postoperative tooth extraction abscess.  Differential includes but not limited to cellulitis, abscess, Gaudencio's angina.  Patient was afebrile here.  Labs here show elevated white count at 16.1, CRP at 110.  Lactic acid is normal.  CT scan is pending.  Discussed with OMFS who will consult on the patient for possible I&D.  Patient care be signed out to the oncoming emergency medicine physician.  I have reviewed the nursing notes. I have reviewed the findings, diagnosis, plan and need for follow up with the patient.    New Prescriptions    No medications on file       Final diagnoses:   Abscess of left jaw       --  Angel Barton  [facility] EMERGENCY DEPARTMENT  1/10/2022     Angel Barton,   01/10/22 0701

## 2022-01-10 NOTE — ED NOTES
"     Emergency Department Patient Sign-out       Brief HPI:  This is a 56 year old female signed out to me by Dr. Barton .  See initial ED Provider note for details of the presentation.            Significant Events prior to my assuming care: OMFS consult and labs CT      Exam:   Patient Vitals for the past 24 hrs:   BP Temp Temp src Pulse Resp SpO2 Height Weight   01/10/22 0544 (!) 139/94 -- -- 95 -- 97 % -- --   01/10/22 0513 (!) 150/84 98.8  F (37.1  C) Oral 97 16 97 % 1.626 m (5' 4\") 58.6 kg (129 lb 1.6 oz)           ED RESULTS:   Results for orders placed or performed during the hospital encounter of 01/10/22 (from the past 24 hour(s))   CBC with platelets differential     Status: Abnormal    Collection Time: 01/10/22  5:47 AM    Narrative    The following orders were created for panel order CBC with platelets differential.  Procedure                               Abnormality         Status                     ---------                               -----------         ------                     CBC with platelets and d...[[id number]]  Abnormal            Final result                 Please view results for these tests on the individual orders.   Comprehensive metabolic panel     Status: Abnormal    Collection Time: 01/10/22  5:47 AM   Result Value Ref Range    Sodium 127 (L) 133 - 144 mmol/L    Potassium 3.7 3.4 - 5.3 mmol/L    Chloride 99 94 - 109 mmol/L    Carbon Dioxide (CO2) 20 20 - 32 mmol/L    Anion Gap 8 3 - 14 mmol/L    Urea Nitrogen 7 7 - 30 mg/dL    Creatinine 0.47 (L) 0.52 - 1.04 mg/dL    Calcium 9.2 8.5 - 10.1 mg/dL    Glucose 105 (H) 70 - 99 mg/dL    Alkaline Phosphatase 107 40 - 150 U/L    AST 14 0 - 45 U/L    ALT 20 0 - 50 U/L    Protein Total 7.9 6.8 - 8.8 g/dL    Albumin 3.0 (L) 3.4 - 5.0 g/dL    Bilirubin Total 0.3 0.2 - 1.3 mg/dL    GFR Estimate >90 >60 mL/min/1.73m2   Lactic acid whole blood     Status: Normal    Collection Time: 01/10/22  5:47 AM   Result Value Ref Range    Lactic Acid 1.2 " 0.7 - 2.0 mmol/L   CRP inflammation     Status: Abnormal    Collection Time: 01/10/22  5:47 AM   Result Value Ref Range    CRP Inflammation 110.0 (H) 0.0 - 8.0 mg/L   Asymptomatic COVID-19 Virus (Coronavirus) by PCR Nasopharyngeal     Status: Normal    Collection Time: 01/10/22  5:47 AM    Specimen: Nasopharyngeal; Swab   Result Value Ref Range    SARS CoV2 PCR Negative Negative, Testing sent to reference lab. Results will be returned via unsolicited result    Narrative    Testing was performed using the Xpert Xpress SARS-CoV-2 Assay on the  Cepheid Gene-Xpert Instrument Systems. Additional information about  this Emergency Use Authorization (EUA) assay can be found via the Lab  Guide. This test should be ordered for the detection of SARS-CoV-2 in  individuals who meet SARS-CoV-2 clinical and/or epidemiological  criteria. Test performance is unknown in asymptomatic patients. This  test is for in vitro diagnostic use under the FDA EUA for  laboratories certified under CLIA to perform high complexity testing.  This test has not been FDA cleared or approved. A negative result  does not rule out the presence of PCR inhibitors in the specimen or  target RNA in concentration below the limit of detection for the  assay. The possibility of a false negative should be considered if  the patient's recent exposure or clinical presentation suggests  COVID-19. This test was validated by the [facility]. This laboratory is certified under  the Clinical Laboratory Improvement Amendments of 1988 (CLIA-88) as  qualified to perform high complexity laboratory testing.     CBC with platelets and differential     Status: Abnormal    Collection Time: 01/10/22  5:47 AM   Result Value Ref Range    WBC Count 16.1 (H) 4.0 - 11.0 10e3/uL    RBC Count 4.32 3.80 - 5.20 10e6/uL    Hemoglobin 14.9 11.7 - 15.7 g/dL    Hematocrit 44.0 35.0 - 47.0 %     (H) 78 - 100 fL    MCH 34.5 (H) 26.5 -  33.0 pg    MCHC 33.9 31.5 - 36.5 g/dL    RDW 13.4 10.0 - 15.0 %    Platelet Count 306 150 - 450 10e3/uL    % Neutrophils 86 %    % Lymphocytes 5 %    % Monocytes 7 %    % Eosinophils 1 %    % Basophils 0 %    % Immature Granulocytes 1 %    NRBCs per 100 WBC 0 <1 /100    Absolute Neutrophils 13.8 (H) 1.6 - 8.3 10e3/uL    Absolute Lymphocytes 0.9 0.8 - 5.3 10e3/uL    Absolute Monocytes 1.2 0.0 - 1.3 10e3/uL    Absolute Eosinophils 0.1 0.0 - 0.7 10e3/uL    Absolute Basophils 0.0 0.0 - 0.2 10e3/uL    Absolute Immature Granulocytes 0.1 <=0.4 10e3/uL    Absolute NRBCs 0.0 10e3/uL   [place] Draw     Status: None    Collection Time: 01/10/22  5:49 AM    Narrative    The following orders were created for panel order [place] Draw.  Procedure                               Abnormality         Status                     ---------                               -----------         ------                     Extra Blue Top Tube[[id number]]                              Final result               Extra Red Top Tube[[id number]]                               Final result                 Please view results for these tests on the individual orders.   Extra Blue Top Tube     Status: None    Collection Time: 01/10/22  5:49 AM   Result Value Ref Range    Hold Specimen JIC    Extra Red Top Tube     Status: None    Collection Time: 01/10/22  5:49 AM   Result Value Ref Range    Hold Specimen JIC    Soft tissue neck CT w contrast     Status: Abnormal    Collection Time: 01/10/22  8:50 AM   Result Value Ref Range    Radiologist flags Left facial abscess. (Urgent)     Narrative    CT SOFT TISSUE NECK W CONTRAST 1/10/2022 8:50 AM    History:  Neck abscess, deep tissue  Additional history per EMR: chief complaint of jaw swelling and pain.  ?She had a tooth extraction in her left lower jaw about 2 weeks ago.  ?Since then she has developed increasing pain as well as swelling in  the area.  ICD-10:      Comparison:  None available.     Technique:  Following intravenous administration of nonionic iodinated  contrast medium, thin section helical CT images were obtained from the  skull base down to the level of the aortic arch.  Axial, coronal and  sagittal reformations were performed with 2-3 mm slice thickness  reconstruction. Images were reviewed in soft tissue, lung and bone  windows.    Contrast: iopamidol (ISOVUE-370) solution 100 mL    Findings:   There is a peripherally enhancing fluid collection centered around the  left hemimandible extending inferiorly and medially. This measures  approximately 4.8 x 3.5 x 3.7 cm in maximum AP, transverse and  craniocaudal dimensions. Medially, this is exerting mass effect on the  adjacent vascular structures and the adjacent musculature including  the left submandibular gland, which is displaced inferiorly and  posteriorly, and mildly hyperenhancing. However there is no  significant airway narrowing.  There is fat stranding and thickening of the overlying subcutaneous  tissues and skin.  There is also fluid and debris within the left third molar tooth  cavity, status post extraction.    No mucosal space abnormality in the nasopharynx, oropharynx,  hypopharynx or the glottis. The thyroid gland appears atrophic.    There is no cervical lymphadenopathy.    Atherosclerotic calcification of both carotid bifurcations, more on  the left side compared to the right.    Evaluation of the osseous structures demonstrate no worrisome lytic or  sclerotic lesion. No overt spinal canal or neuroforaminal stenosis.  The visualized paranasal sinuses are clear. The mastoid air cells are  clear.     There are numerous nodules with adjacent fibrotic scarring in left  lung apex, many of which are calcified. Few of these nodules also  demonstrate a tree-in-bud appearance.      Impression    Impression:  1. Left facial abscess along the left hemimandible. No significant  narrowing of the airway. No lymphadenopathy.  2. Fibrocalcific nodules  in the left lung apex with fibrotic scarring  may represent sequela of prior infection; possibility of active  infection cannot be completely excluded.     [Urgent Result: Left facial abscess.]    Finding was identified on 1/10/2022 9:02 AM.     Dr. Pickett was contacted by Dr. Ybarra at 1/10/2022 9:07 AM and  verbalized understanding of the urgent finding.     I have personally reviewed the examination and initial interpretation  and I agree with the findings.    TORSTEN CID MD         SYSTEM ID:  [id number]       ED MEDICATIONS:   Medications   0.9% sodium chloride BOLUS (1,000 mLs Intravenous New Bag 1/10/22 0601)     Followed by   sodium chloride 0.9% infusion ( Intravenous New Bag 1/10/22 0908)   HYDROmorphone (PF) (DILAUDID) injection 0.5 mg (0.5 mg Intravenous Given 1/10/22 0905)   ondansetron (ZOFRAN) injection 4 mg (4 mg Intravenous Given 1/10/22 0603)   clindamycin (CLEOCIN) infusion 900 mg (0 mg Intravenous Stopped 1/10/22 0658)   iopamidol (ISOVUE-370) solution 100 mL (100 mLs Intravenous Given 1/10/22 0822)   sodium chloride (PF) 0.9% PF flush 60 mL (60 mLs Intravenous Given 1/10/22 0822)         Impression:    ICD-10-CM    1. Abscess of left jaw  M27.2 Case Request: INCISION AND DRAINAGE, MANDIBLE, EXTRACTION OF TEETH AS INDICATED, DEEP TISSUE OR BONE BIOPSY, OBTAINING ASPIRATE CULTURES.     Case Request: INCISION AND DRAINAGE, MANDIBLE, EXTRACTION OF TEETH AS INDICATED, DEEP TISSUE OR BONE BIOPSY, OBTAINING ASPIRATE CULTURES.       Plan:    Pending studies include CT showed large abscess, OMFS to plan to drain in OR this pm, asked us to get Medicine bed for post ope, D/W Medicine-admit as Obs.        Seven Zelaya MD, Seven Zamarripa MD  01/10/22 5880

## 2022-01-10 NOTE — H&P
[facility]  History and Physical - [place]   Date of Admission:  1/10/2022    Assessment & Plan      Lara Melendez is a 56 year old female with hypertension, hyperlipidemia, hypothyroidism, anxiety, and tobacco use disorder admitted on 1/10/2022 for facial abscess related to odontogenic infection.     # Facial abscess (left hemimandible)  # Superimposed cellulitis  Presents with odynophagia, left face pain, and swelling in context of left lower jaw tooth extraction three weeks ago. CT shows facial abscess of left hemimandible without airway compromise or lymphadenopathy. On admission, afebrile, , WBC 16, LA normal. No submandibular involvement; no evidence of Gaudencio's angina.   - OMFS consult; plan for abscess I&D; send for culture  - Clindamycin 900mg q8hr IV  - Fluconazole (100 mg daily) for Candida from mandible swab 1/4  - Pain management: PO oxy or IV dilaudid if NPO or can't tolerate oral  - Vitals q4  - CRP q48hr, CBC daily    # Hypovolemic hyponatremia  Na 127 on admission. Due to poor oral intake. Trend.  - Got NS bolus; now on NS infusion 125cc/hr    Chronic:  - Hypertension:  PTA atenolol 50mg daily  - HLD: PTA atorvastatin 20mg daily  - Hypothyroidism: PTA synthroid 112mcg daily   - Anxiety: PTA paxil 10mg daily  - Nicotine use disorder: Smokes 1PPD. Ordered NRT. Smoking cessation important to promote resolution of oral infection.      Diet:   NPO  DVT Prophylaxis: PADUA 3, PCDs  Hines Catheter: Not present  Fluids: 125cc/hr NS  Central Lines: None  Code Status:   Full code    Clinically Significant Risk Factors Present on Admission         # Hyponatremia: Na = 127 mmol/L (Ref range: 133 - 144 mmol/L) on admission, will monitor as appropriate            Disposition Plan   Expected Discharge:    Anticipated discharge location:  Awaiting care coordination huddle  Delays:            The patient's care was discussed with the Attending  Physician, Dr. Goncalves.    Joellen Bates MD  [place]  Securely message with the [employer] Web Console (learn more here)  Text page via [facility] Paging/Directory    Please see sign in/sign out for up to date coverage information  ______________________________________________________________________    Chief Complaint   Face pain and swelling    History is obtained from the patient and EMR    History of Present Illness   Lara Melendez is a 56 year old female who presents to us with a chief complaint of jaw swelling and pain. She had a tooth extraction due to periapical abscess in her left lower jaw about 3 weeks ago.    She felt well the first week after extraction (Central week). Starting New Year's week, she had some left-sided facial pain and swelling. She saw her dentist who did a mandible swab (1/4) that came back w/ Candida in broth only-- unclear if infectious or colonized. Started on flagyll and fluconazole in addition to keflex. Pain and swelling worsened. Also had pain with swallowing limiting her oral intake.     Today she started having more swelling as well as redness of her left lower face. No fevers, chills, drooling, voice changes, ear pain, or headaches.     In the ER, she got 1L NS + 125cc/hr NS and was started on IV clinda. OMFS was consulted and [facility]     Review of Systems    Review of Systems   Constitutional: Positive for appetite change. Negative for diaphoresis and fever.   HENT: Positive for dental problem, facial swelling, sore throat and trouble swallowing. Negative for congestion, drooling, ear pain, hearing loss, rhinorrhea, sinus pressure, sinus pain and voice change.    Eyes: Negative for pain and visual disturbance.   Respiratory: Negative for chest tightness and shortness of breath.    Cardiovascular: Negative for chest pain, palpitations and leg swelling.   Gastrointestinal:  Negative for abdominal pain, constipation, diarrhea, nausea and vomiting.   Musculoskeletal: Negative for neck pain and neck stiffness.   Skin: Positive for color change.   Neurological: Negative for dizziness, light-headedness, numbness and headaches.   Hematological: Negative for adenopathy.   Psychiatric/Behavioral: The patient is nervous/anxious.        Past Medical History    I have reviewed this patient's medical history and updated it with pertinent information if needed.   Past Medical History:   Diagnosis Date     Anxiety      Elevated cholesterol      Hypertension      Thyroid disease         Past Surgical History   I have reviewed this patient's surgical history and updated it with pertinent information if needed.  Past Surgical History:   Procedure Laterality Date     CHOLECYSTECTOMY       ENT SURGERY      tooth extraction        Social History   Smokes 1PPD for 30 years. Occasional alcohol use (last was 3-4 weeks ago). No illicit drugs. Lives at home with her , Luis. Works as a . Has kids.     Family History     Daughters have factor V leiden deficiency.     Prior to Admission Medications   Prior to Admission Medications   Prescriptions Last Dose Informant Patient Reported? Taking?   PARoxetine HCl (PAXIL PO)   Yes Yes   Sig: Take 10 mg by mouth daily   atenolol (TENORMIN) 50 MG tablet   Yes Yes   Sig: Take 50 mg by mouth daily   atorvastatin (LIPITOR) 20 MG tablet   Yes Yes   Sig: Take 20 mg by mouth daily   cephALEXin (KEFLEX) 500 MG capsule   Yes Yes   Sig: Take 500 mg by mouth 2 times daily   fluconazole (DIFLUCAN) 100 MG tablet   Yes Yes   Sig: Take 100 mg by mouth daily   levothyroxine (SYNTHROID/LEVOTHROID) 112 MCG tablet   Yes Yes   Sig: Take 112 mcg by mouth daily   metroNIDAZOLE (FLAGYL) 500 MG tablet   Yes Yes   Sig: Take 500 mg by mouth 4 times daily   oxyCODONE (ROXICODONE-INTENSOL) 20 mg/mL CONC (HIGH CONC) solution   Yes Yes   Sig: Take 5 mg by mouth every 6 hours as  needed for moderate to severe pain      Facility-Administered Medications: None     Allergies   Allergies   Allergen Reactions     Sulfa Drugs      Penicillins Rash       Physical Exam   Vital Signs: Temp: 98.8  F (37.1  C) Temp src: Oral BP: (!) 139/94 Pulse: 95   Resp: 16 SpO2: 97 % O2 Device: None (Room air)    Weight: 129 lbs 1.6 oz   General: Appears comfortable. Somewhat anxious, but no acute distress.  Oropharynx: Opens 2.5 finger widths. No drooling.   Face: Redness and warm, firm swelling along left lower jaw line that is tender to palpation.   Ears: Hears well. No pain w/ traction of pinna. TMs obscured by impacted cerumen.   Neck: ROM intact, though flexion and extension are painful.   Lungs: Some pursed lip breathing (states she's doing that because she's nervous). Talking in complete sentences without difficulty. Sating well on room air. CTAB w/o wheeze or crackles.   Heart: RRR w/o murmur. No LE edema.   Neuro: Oriented to person, place, time, and event. Sensation intact in V1, V2, V3.   Lymph: No palpable periauricular or cervical lymphadenopathy.    Data   Results for orders placed or performed during the hospital encounter of 01/10/22 (from the past 24 hour(s))   CBC with platelets differential    Narrative    The following orders were created for panel order CBC with platelets differential.  Procedure                               Abnormality         Status                     ---------                               -----------         ------                     CBC with platelets and d...[[id number]]  Abnormal            Final result                 Please view results for these tests on the individual orders.   Comprehensive metabolic panel   Result Value Ref Range    Sodium 127 (L) 133 - 144 mmol/L    Potassium 3.7 3.4 - 5.3 mmol/L    Chloride 99 94 - 109 mmol/L    Carbon Dioxide (CO2) 20 20 - 32 mmol/L    Anion Gap 8 3 - 14 mmol/L    Urea Nitrogen 7 7 - 30 mg/dL    Creatinine 0.47 (L) 0.52 -  1.04 mg/dL    Calcium 9.2 8.5 - 10.1 mg/dL    Glucose 105 (H) 70 - 99 mg/dL    Alkaline Phosphatase 107 40 - 150 U/L    AST 14 0 - 45 U/L    ALT 20 0 - 50 U/L    Protein Total 7.9 6.8 - 8.8 g/dL    Albumin 3.0 (L) 3.4 - 5.0 g/dL    Bilirubin Total 0.3 0.2 - 1.3 mg/dL    GFR Estimate >90 >60 mL/min/1.73m2   Lactic acid whole blood   Result Value Ref Range    Lactic Acid 1.2 0.7 - 2.0 mmol/L   CRP inflammation   Result Value Ref Range    CRP Inflammation 110.0 (H) 0.0 - 8.0 mg/L   Asymptomatic COVID-19 Virus (Coronavirus) by PCR Nasopharyngeal    Specimen: Nasopharyngeal; Swab   Result Value Ref Range    SARS CoV2 PCR Negative Negative, Testing sent to reference lab. Results will be returned via unsolicited result    Narrative    Testing was performed using the Xpert Xpress SARS-CoV-2 Assay on the  Cepheid Gene-Xpert Instrument Systems. Additional information about  this Emergency Use Authorization (EUA) assay can be found via the Lab  Guide. This test should be ordered for the detection of SARS-CoV-2 in  individuals who meet SARS-CoV-2 clinical and/or epidemiological  criteria. Test performance is unknown in asymptomatic patients. This  test is for in vitro diagnostic use under the FDA EUA for  laboratories certified under CLIA to perform high complexity testing.  This test has not been FDA cleared or approved. A negative result  does not rule out the presence of PCR inhibitors in the specimen or  target RNA in concentration below the limit of detection for the  assay. The possibility of a false negative should be considered if  the patient's recent exposure or clinical presentation suggests  COVID-19. This test was validated by the [facility]. This laboratory is certified under  the Clinical Laboratory Improvement Amendments of 1988 (CLIA-88) as  qualified to perform high complexity laboratory testing.     CBC with platelets and differential   Result Value Ref Range     WBC Count 16.1 (H) 4.0 - 11.0 10e3/uL    RBC Count 4.32 3.80 - 5.20 10e6/uL    Hemoglobin 14.9 11.7 - 15.7 g/dL    Hematocrit 44.0 35.0 - 47.0 %     (H) 78 - 100 fL    MCH 34.5 (H) 26.5 - 33.0 pg    MCHC 33.9 31.5 - 36.5 g/dL    RDW 13.4 10.0 - 15.0 %    Platelet Count 306 150 - 450 10e3/uL    % Neutrophils 86 %    % Lymphocytes 5 %    % Monocytes 7 %    % Eosinophils 1 %    % Basophils 0 %    % Immature Granulocytes 1 %    NRBCs per 100 WBC 0 <1 /100    Absolute Neutrophils 13.8 (H) 1.6 - 8.3 10e3/uL    Absolute Lymphocytes 0.9 0.8 - 5.3 10e3/uL    Absolute Monocytes 1.2 0.0 - 1.3 10e3/uL    Absolute Eosinophils 0.1 0.0 - 0.7 10e3/uL    Absolute Basophils 0.0 0.0 - 0.2 10e3/uL    Absolute Immature Granulocytes 0.1 <=0.4 10e3/uL    Absolute NRBCs 0.0 10e3/uL   [place] Draw    Narrative    The following orders were created for panel order [place] Draw.  Procedure                               Abnormality         Status                     ---------                               -----------         ------                     Extra Blue Top Tube[[id number]]                              Final result               Extra Red Top Tube[[id number]]                               Final result                 Please view results for these tests on the individual orders.   Extra Blue Top Tube   Result Value Ref Range    Hold Specimen JIC    Extra Red Top Tube   Result Value Ref Range    Hold Specimen JIC    Soft tissue neck CT w contrast   Result Value Ref Range    Radiologist flags Left facial abscess. (Urgent)     Narrative    CT SOFT TISSUE NECK W CONTRAST 1/10/2022 8:50 AM    History:  Neck abscess, deep tissue  Additional history per EMR: chief complaint of jaw swelling and pain.  ?She had a tooth extraction in her left lower jaw about 2 weeks ago.  ?Since then she has developed increasing pain as well as swelling in  the area.  ICD-10:      Comparison:  None available.     Technique: Following intravenous  administration of nonionic iodinated  contrast medium, thin section helical CT images were obtained from the  skull base down to the level of the aortic arch.  Axial, coronal and  sagittal reformations were performed with 2-3 mm slice thickness  reconstruction. Images were reviewed in soft tissue, lung and bone  windows.    Contrast: iopamidol (ISOVUE-370) solution 100 mL    Findings:   There is a peripherally enhancing fluid collection centered around the  left hemimandible extending inferiorly and medially. This measures  approximately 4.8 x 3.5 x 3.7 cm in maximum AP, transverse and  craniocaudal dimensions. Medially, this is exerting mass effect on the  adjacent vascular structures and the adjacent musculature including  the left submandibular gland, which is displaced inferiorly and  posteriorly, and mildly hyperenhancing. However there is no  significant airway narrowing.  There is fat stranding and thickening of the overlying subcutaneous  tissues and skin.  There is also fluid and debris within the left third molar tooth  cavity, status post extraction.    No mucosal space abnormality in the nasopharynx, oropharynx,  hypopharynx or the glottis. The thyroid gland appears atrophic.    There is no cervical lymphadenopathy.    Atherosclerotic calcification of both carotid bifurcations, more on  the left side compared to the right.    Evaluation of the osseous structures demonstrate no worrisome lytic or  sclerotic lesion. No overt spinal canal or neuroforaminal stenosis.  The visualized paranasal sinuses are clear. The mastoid air cells are  clear.     There are numerous nodules with adjacent fibrotic scarring in left  lung apex, many of which are calcified. Few of these nodules also  demonstrate a tree-in-bud appearance.      Impression    Impression:  1. Left facial abscess along the left hemimandible. No significant  narrowing of the airway. No lymphadenopathy.  2. Fibrocalcific nodules in the left lung apex  with fibrotic scarring  may represent sequela of prior infection; possibility of active  infection cannot be completely excluded.     [Urgent Result: Left facial abscess.]    Finding was identified on 1/10/2022 9:02 AM.     Dr. Pickett was contacted by Dr. Ybarra at 1/10/2022 9:07 AM and  verbalized understanding of the urgent finding.     I have personally reviewed the examination and initial interpretation  and I agree with the findings.    TORSTEN CID MD         SYSTEM ID:  [id number]

## 2022-01-10 NOTE — ED TRIAGE NOTES
56 year old female presents with concerns of left lower dental pain and facial swelling after tooth extraction in late December.  Patient has had socket drained and is currently on antibiotics and pain medications.  Patient is only able to drink liquids secondary to pain.

## 2022-01-11 VITALS
HEIGHT: 64 IN | SYSTOLIC BLOOD PRESSURE: 133 MMHG | TEMPERATURE: 98.4 F | RESPIRATION RATE: 22 BRPM | DIASTOLIC BLOOD PRESSURE: 77 MMHG | HEART RATE: 79 BPM | WEIGHT: 133.9 LBS | OXYGEN SATURATION: 95 % | BODY MASS INDEX: 22.86 KG/M2

## 2022-01-11 PROBLEM — E78.2 MIXED HYPERLIPIDEMIA: Status: ACTIVE | Noted: 2022-01-11

## 2022-01-11 PROBLEM — Z20.822 LAB TEST NEGATIVE FOR COVID-19 VIRUS: Status: ACTIVE | Noted: 2022-01-11

## 2022-01-11 PROBLEM — Z98.890 STATUS POST INCISION AND DRAINAGE: Status: ACTIVE | Noted: 2022-01-11

## 2022-01-11 LAB
ALBUMIN SERPL-MCNC: 2.4 G/DL (ref 3.4–5)
ALP SERPL-CCNC: 86 U/L (ref 40–150)
ALT SERPL W P-5'-P-CCNC: 17 U/L (ref 0–50)
ANION GAP SERPL CALCULATED.3IONS-SCNC: 9 MMOL/L (ref 3–14)
AST SERPL W P-5'-P-CCNC: 16 U/L (ref 0–45)
ATRIAL RATE - MUSE: 113 BPM
BASOPHILS # BLD AUTO: 0 10E3/UL (ref 0–0.2)
BASOPHILS NFR BLD AUTO: 0 %
BILIRUB SERPL-MCNC: 0.3 MG/DL (ref 0.2–1.3)
BUN SERPL-MCNC: 3 MG/DL (ref 7–30)
CALCIUM SERPL-MCNC: 8.7 MG/DL (ref 8.5–10.1)
CHLORIDE BLD-SCNC: 103 MMOL/L (ref 94–109)
CO2 SERPL-SCNC: 24 MMOL/L (ref 20–32)
CREAT SERPL-MCNC: 0.45 MG/DL (ref 0.52–1.04)
DIASTOLIC BLOOD PRESSURE - MUSE: NORMAL MMHG
EOSINOPHIL # BLD AUTO: 0 10E3/UL (ref 0–0.7)
EOSINOPHIL NFR BLD AUTO: 0 %
ERYTHROCYTE [DISTWIDTH] IN BLOOD BY AUTOMATED COUNT: 13.7 % (ref 10–15)
GFR SERPL CREATININE-BSD FRML MDRD: >90 ML/MIN/1.73M2
GLUCOSE BLD-MCNC: 139 MG/DL (ref 70–99)
HCT VFR BLD AUTO: 37.4 % (ref 35–47)
HGB BLD-MCNC: 12.5 G/DL (ref 11.7–15.7)
IMM GRANULOCYTES # BLD: 0.1 10E3/UL
IMM GRANULOCYTES NFR BLD: 1 %
INTERPRETATION ECG - MUSE: NORMAL
LYMPHOCYTES # BLD AUTO: 0.3 10E3/UL (ref 0.8–5.3)
LYMPHOCYTES NFR BLD AUTO: 3 %
MAGNESIUM SERPL-MCNC: 2 MG/DL (ref 1.6–2.3)
MCH RBC QN AUTO: 34.5 PG (ref 26.5–33)
MCHC RBC AUTO-ENTMCNC: 33.4 G/DL (ref 31.5–36.5)
MCV RBC AUTO: 103 FL (ref 78–100)
MONOCYTES # BLD AUTO: 0.2 10E3/UL (ref 0–1.3)
MONOCYTES NFR BLD AUTO: 2 %
NEUTROPHILS # BLD AUTO: 8.8 10E3/UL (ref 1.6–8.3)
NEUTROPHILS NFR BLD AUTO: 94 %
NRBC # BLD AUTO: 0 10E3/UL
NRBC BLD AUTO-RTO: 0 /100
P AXIS - MUSE: 60 DEGREES
PHOSPHATE SERPL-MCNC: 3.4 MG/DL (ref 2.5–4.5)
PLATELET # BLD AUTO: 275 10E3/UL (ref 150–450)
POTASSIUM BLD-SCNC: 4.1 MMOL/L (ref 3.4–5.3)
PR INTERVAL - MUSE: 142 MS
PROT SERPL-MCNC: 6.7 G/DL (ref 6.8–8.8)
QRS DURATION - MUSE: 76 MS
QT - MUSE: 316 MS
QTC - MUSE: 433 MS
R AXIS - MUSE: 29 DEGREES
RBC # BLD AUTO: 3.62 10E6/UL (ref 3.8–5.2)
SODIUM SERPL-SCNC: 136 MMOL/L (ref 133–144)
SYSTOLIC BLOOD PRESSURE - MUSE: NORMAL MMHG
T AXIS - MUSE: -50 DEGREES
VENTRICULAR RATE- MUSE: 113 BPM
WBC # BLD AUTO: 9.4 10E3/UL (ref 4–11)

## 2022-01-11 PROCEDURE — 36415 COLL VENOUS BLD VENIPUNCTURE: CPT | Performed by: STUDENT IN AN ORGANIZED HEALTH CARE EDUCATION/TRAINING PROGRAM

## 2022-01-11 PROCEDURE — 96376 TX/PRO/DX INJ SAME DRUG ADON: CPT

## 2022-01-11 PROCEDURE — 99217 PR OBSERVATION CARE DISCHARGE: CPT | Mod: GC | Performed by: FAMILY MEDICINE

## 2022-01-11 PROCEDURE — G0378 HOSPITAL OBSERVATION PER HR: HCPCS

## 2022-01-11 PROCEDURE — 83735 ASSAY OF MAGNESIUM: CPT | Performed by: STUDENT IN AN ORGANIZED HEALTH CARE EDUCATION/TRAINING PROGRAM

## 2022-01-11 PROCEDURE — 250N000013 HC RX MED GY IP 250 OP 250 PS 637: Performed by: STUDENT IN AN ORGANIZED HEALTH CARE EDUCATION/TRAINING PROGRAM

## 2022-01-11 PROCEDURE — 84100 ASSAY OF PHOSPHORUS: CPT | Performed by: STUDENT IN AN ORGANIZED HEALTH CARE EDUCATION/TRAINING PROGRAM

## 2022-01-11 PROCEDURE — 258N000003 HC RX IP 258 OP 636: Performed by: EMERGENCY MEDICINE

## 2022-01-11 PROCEDURE — 80053 COMPREHEN METABOLIC PANEL: CPT | Performed by: STUDENT IN AN ORGANIZED HEALTH CARE EDUCATION/TRAINING PROGRAM

## 2022-01-11 PROCEDURE — 85025 COMPLETE CBC W/AUTO DIFF WBC: CPT | Performed by: STUDENT IN AN ORGANIZED HEALTH CARE EDUCATION/TRAINING PROGRAM

## 2022-01-11 PROCEDURE — 250N000011 HC RX IP 250 OP 636: Performed by: STUDENT IN AN ORGANIZED HEALTH CARE EDUCATION/TRAINING PROGRAM

## 2022-01-11 PROCEDURE — 96375 TX/PRO/DX INJ NEW DRUG ADDON: CPT

## 2022-01-11 PROCEDURE — 250N000011 HC RX IP 250 OP 636: Performed by: EMERGENCY MEDICINE

## 2022-01-11 RX ORDER — ACETAMINOPHEN 325 MG/1
325-650 TABLET ORAL EVERY 6 HOURS PRN
Qty: 112 TABLET | Refills: 0 | Status: SHIPPED | OUTPATIENT
Start: 2022-01-11 | End: 2022-01-25

## 2022-01-11 RX ORDER — IBUPROFEN 600 MG/1
600 TABLET, FILM COATED ORAL EVERY 6 HOURS PRN
Qty: 56 TABLET | Refills: 0 | Status: SHIPPED | OUTPATIENT
Start: 2022-01-11 | End: 2022-01-25

## 2022-01-11 RX ORDER — ACETAMINOPHEN 325 MG/1
650 TABLET ORAL EVERY 6 HOURS
Status: DISCONTINUED | OUTPATIENT
Start: 2022-01-11 | End: 2022-01-11 | Stop reason: HOSPADM

## 2022-01-11 RX ORDER — CLINDAMYCIN HCL 300 MG
300 CAPSULE ORAL 3 TIMES DAILY
Qty: 27 CAPSULE | Refills: 0 | Status: SHIPPED | OUTPATIENT
Start: 2022-01-11 | End: 2022-01-20

## 2022-01-11 RX ORDER — FLUCONAZOLE 100 MG/1
100 TABLET ORAL DAILY
Status: ON HOLD | COMMUNITY
Start: 2022-01-11 | End: 2022-02-07

## 2022-01-11 RX ADMIN — PAROXETINE HYDROCHLORIDE 10 MG: 10 TABLET, FILM COATED ORAL at 08:27

## 2022-01-11 RX ADMIN — ATORVASTATIN CALCIUM 20 MG: 20 TABLET, FILM COATED ORAL at 08:25

## 2022-01-11 RX ADMIN — CLINDAMYCIN IN 5 PERCENT DEXTROSE 900 MG: 18 INJECTION, SOLUTION INTRAVENOUS at 05:25

## 2022-01-11 RX ADMIN — LEVOTHYROXINE SODIUM 112 MCG: 0.11 TABLET ORAL at 08:26

## 2022-01-11 RX ADMIN — CLINDAMYCIN IN 5 PERCENT DEXTROSE 900 MG: 18 INJECTION, SOLUTION INTRAVENOUS at 14:44

## 2022-01-11 RX ADMIN — SODIUM CHLORIDE: 9 INJECTION, SOLUTION INTRAVENOUS at 05:03

## 2022-01-11 RX ADMIN — ACETAMINOPHEN 650 MG: 325 TABLET, FILM COATED ORAL at 09:15

## 2022-01-11 RX ADMIN — DEXAMETHASONE SODIUM PHOSPHATE 4 MG: 4 INJECTION, SOLUTION INTRA-ARTICULAR; INTRALESIONAL; INTRAMUSCULAR; INTRAVENOUS; SOFT TISSUE at 14:44

## 2022-01-11 RX ADMIN — DEXAMETHASONE SODIUM PHOSPHATE 4 MG: 4 INJECTION, SOLUTION INTRA-ARTICULAR; INTRALESIONAL; INTRAMUSCULAR; INTRAVENOUS; SOFT TISSUE at 08:25

## 2022-01-11 RX ADMIN — ACETAMINOPHEN 975 MG: 325 TABLET, FILM COATED ORAL at 03:51

## 2022-01-11 RX ADMIN — ACETAMINOPHEN 650 MG: 325 TABLET, FILM COATED ORAL at 14:43

## 2022-01-11 RX ADMIN — ATENOLOL 50 MG: 25 TABLET ORAL at 08:25

## 2022-01-11 RX ADMIN — DEXAMETHASONE SODIUM PHOSPHATE 4 MG: 4 INJECTION, SOLUTION INTRA-ARTICULAR; INTRALESIONAL; INTRAMUSCULAR; INTRAVENOUS; SOFT TISSUE at 01:13

## 2022-01-11 RX ADMIN — FLUCONAZOLE 100 MG: 100 TABLET ORAL at 08:26

## 2022-01-11 ASSESSMENT — ACTIVITIES OF DAILY LIVING (ADL)
ADLS_ACUITY_SCORE: 4

## 2022-01-11 ASSESSMENT — MIFFLIN-ST. JEOR: SCORE: 1172.37

## 2022-01-11 NOTE — PLAN OF CARE
RN assumed cares at [phone]     Temp: 97.2  F (36.2  C) Temp src: Oral BP: (!) 143/88 Pulse: 80   Resp: 20 SpO2: 95 % O2 Device: Nasal cannula Oxygen Delivery: 2 LPM     Neuro: A&O x4, able to make needs known.   Cardiac: WDL  Respiratory: Capnography monitoring in place, WDL. LS diminished bilaterally, denies SOB.   GI/: Up to bathroom with assistance. Denies N/V.  Skin: L facial incision covered, penrose drain in place. Blanchable redness to bilateral heels.  IV/Drains: R PIV infusing NS at 125 mL/hr.  Activity: SBA in room  Nutrition: Mechanical soft, advance as tolerated.   Pain: Pt reported 8/10 throat pain partially relieved w/ scheduled tylenol & cold drinks    Plan of Care: Pt able to rest comfortably, no events. Continue to monitor wound status, respiratory status, pain & follow POC.

## 2022-01-11 NOTE — DISCHARGE SUMMARY
[facility]  Discharge Summary - Medicine & Pediatrics       Date of Admission:  1/10/2022  Date of Discharge:  1/11/2022  Discharging Provider: Dr. Goncalves  Discharge Service: [facility]    Discharge Diagnoses   1. Left facial hemimandibular abscess s/p cutaneous I&D 1/10 with 2 external drains  2. Superimposed facial cellulitis  3. Hypertension  4. Hyperlipidemia  5. Hypothyroidism  6. Generalized anxiety disorder  7. Tobacco use disorder    Follow-ups Needed After Discharge   Follow up with [facility] on Thursday 1/13 (they'll help to arrange).   Follow-up with your PCP within a couple of weeks to further discuss smoking cessation.       Unresulted Labs Ordered in the Past 30 Days of this Admission     Date and Time Order Name Status Description    1/10/2022  6:51 PM Abscess Aerobic Bacterial Culture Routine Preliminary     1/10/2022  6:51 PM Fungal or Yeast Culture Routine In process     1/10/2022  6:51 PM Anaerobic Bacterial Culture Routine In process     1/10/2022  5:41 AM Blood Culture Peripheral Blood Preliminary     1/10/2022  5:41 AM Blood Culture Peripheral Blood Preliminary       These results will be followed up by PCP    Discharge Disposition   Discharged to home  Condition at discharge: Stable    Hospital Course      Lara Melendez 56 year old female with PMH of hypertension, hypothyroidism, anxiety, and tobacco use disorder was admitted for left hemimandible facial abscess w/ superimposed cellulitis.      # Large left side facial abscess (left hemimandible) s/p I&D 1/10  # Superimposed cellulitis  Presented to ED with odynophagia, left face pain, and swelling in context of recent tooth extraction (#17) on 12/22/21 at outside office.  CT showed facial abscess of left hemimandible without airway compromise or lymphadenopathy. On admission, afebrile, , WBC 16, LA normal. No submandibular involvement; no evidence of Gaudencio's angina. S/p 4 doses IV decadron over  24 hours. Taken to OR w/ [facility] and now s/p extraoral incision and drainage of left submandibular space, 2x drains present, will stay in until appointment with [facility] on Thursday (we will help to coordinate). Can place gauze with tape over the area or continue to use neuro netting  - Follow-up Thursday 1/13/22 with [facility] for drain removal  - Clindamycin 300 mg TID for 10 days (end 1/21)  - Fluconazole (100 mg daily) for Candida from mandible swab to complete previously prescribed course  - Stop PTA keflex and flagyl  - Pain management: ibuprofen/tylenol, oxycodone PRN for breakthrough pain.  - Diet as tolerated    # Hypovolemic hyponatremia, resolved  Na 127 on admission. Due to poor oral intake. Resolved with IV fluids.     Chronic:  - Hypertension:  PTA atenolol 50mg daily  - HLD: PTA atorvastatin 20mg daily  - Hypothyroidism: PTA synthroid 112mcg daily   - Anxiety: PTA paxil 10mg daily  - Nicotine use disorder: Smokes 1PPD. Ordered NRT. Smoking cessation important to promote resolution of oral infection.        Consultations This Hospital Stay   [facility]    Code Status   Full Code       The patient was discussed with Dr. Ivanna Cobos, MS4  I was present with the medical student who participated in the service and in the documentation of this note. I have verified the history and personally performed the physical exam and medical decision making, and have verified the content of the note, which accurately reflects my assessment of the patient and the plan of care.  Joellen Bates, PGY-3  [facility]  [facility] [address] [address]  Phone: [phone]  ______________________________________________________________________    Physical Exam   Vital Signs: Temp: 98.4  F (36.9  C) Temp src: Oral BP: 133/77 Pulse: 79   Resp: 22 SpO2: 95 % O2 Device: None (Room air) Oxygen Delivery: 3 LPM  Weight: 133 lbs 14.4 oz  General: Alert and oriented. Comfortable sitting  up in bed.  Skin: Warm and dry. Large dressing over left mandible with bloody drainage  Mouth: Opens 2.5 finger widths. No drooling. Speech intact  Neck: Flexion, extension, lateral side bending, and rotational ROM intact.  Eyes: Extra-ocular muscles intact  CV: No cyanosis or pallor, warm and well perfused. RRR  Respiratory: No respiratory distress, no accessory muscle use. Mild wheezing.   Neuro: comprehension intact. Gross and fine motor skills intact. Sensation intact V1, V2, V3.   Psychiatric: Mood and affect appear normal.   Extremities: Warm, able to move all four extremities at will.      Primary Care Physician   [facility]    Discharge Orders      Reason for your hospital stay    You were seen in the hospital for a submandibular abscess and admitted post operatively for management.     Discharge Instructions    Return to clinic in 2-4 days, our clinic will call to coordinate appointment    HOME CARE INSTRUCTIONS FOLLOWING SURGERY    After surgery is over, you will be taken to the recovery room.  While in recovery your progress will be monitored closely by specially trained nurses.  You will remain in the recovery room until you are sufficiently awake, usually two (2) hours. While you are in the recovery room, your immediate family will not be allowed in for the privacy of other patients in the recovery room. After the recovery room stay, you will be transferred to your room or occasionally to a special care unit. Your family and friends will be informed of your room number and will allowed to meet you there.     VISITORS   It has been our experience that the evening after surgery was a time when visitors, other than family, are best kept to a minimum. It is encouraged that you have very few visitors and they be limited to the immediate members of the family and/or very close friends.     WOUNDS  The wounds in your mouth and skin should be left alone and undisturbed for the first 24 hours  after surgery. Do not rinse your mouth or use a mouthwash for 1 days following surgery. Avoid probing the wound. Trauma to incisions can result loosening of sutures and opening of wounds. If you smoke please refrain for as long as possible, up to a week (or forever) is beneficial to healing.      NAUSEA AND VOMITING   You may experience some nausea or vomiting after surgery.      SWELLING  Swelling occurs after all surgery.  The degree of swelling is quite variable in different individuals.  More swelling usually occurs with the lower jaw surgery.  Swelling will continue to increase for approximately 48 to 72 hours following surgery, and then will resolve within 10 days to 2 weeks. We try to minimize your swelling with a medication but some swelling should be anticipated. You can reduce swelling by keeping your head elevated for the first week following surgery and by walking as soon as possible after surgery.      FOLLOWING SURGERY  It is common to experience minor bleeding following surgery.  This generally stops at 24 to 48 hours but may continue for 7-10 days after surgery.Your left lip may be numb from the surgery and may stay that way for a few months. Most patients recover all sensation.      NASAL STUFFINESS AND SORE THROAT  A sore throat can occur, from the breathing tube placed during surgery by the anesthesiologist. Your sore throat should improve about 3-5 days following surgery. If your throat is still bothering you more than a week after surgery please make us aware of this.     SOFT DIET  It will be important that you drink a sufficient volume of fluids following surgery. An average adult requires 2 to 3 quarts of fluid every 24 hours.  While this may seem like a large quantity, it can be best achieved with constant sipping.  You may advance your diet as tolerated.      CLEANING THE TEETH   Continue to brush your teeth and keep up routine oral hygiene.      POSTOPERATIVE PAIN  Pain must be  anticipated.  In most instances, however, it is moderate and treated with medications. Every patient experiences pain differently and there is no one size fits all approach. Your surgical team will attempt to tailor your medications to best control your pain. Generally, no injections are needed for pain and a liquid or pill form medication is all that is required.    MEDICATIONS  During the period of hospitalizations, you will usually be given antibiotics, ointment to keep your lips moist, and a pain medication if needed.  Most often these will be discontinued on discharge from the hospital, except for some of the medications, which will be used for 3 days to 2 weeks following surgery. Antibiotic and antifungal will be prescribe, please follow the directions for these medications closely.     Common medications:  Hydrocodone/Oxycodone - Narcotic pain control  Acetominophen/Ibuprofen - Non-narcotic pain control  Amoxicillin/Clindamycin - Antibiotic, typically a 5-7 day course  Ondansetron/Phenergan/Compazine/Scopolamine - Anti-nausea medication  Guaufenesin - Mucolytic, used to keep mucous from hardening in nasal passages after upper jaw surgery  Sudafed/Claritin - Decongestant, used to keep sinuses clear after upper jaw surgery  Colace/Senna - Stool softener used while taking Narcotic pain medication      DAY OF DISCHARGE  You will be encouraged to resume your normal activities as soon as possible. The drain will stay in place until your follow up appointment, dressing can be kept over the area to catch any drainage.    AFTER HOURS CONTACT FOR EMERGENCIES:  Please call the [facility] switchboard at [phone] and ask to have the Oral and Maxillofacial Surgery Resident On-call paged.     It is our desire to make your recovery as smooth as possible. These instructions are given to assist you following your surgery. If you have any questions please call the clinic at [phone].     [facility]ult [facility]  Follow-up and recommended labs and tests    Follow up with [facility] on Thursday 1/13 (they'll help to arrange).   Follow-up with your PCP within a couple of weeks to further discuss smoking cessation.     Appointments on [place] and/or [facility] (with [facility] or [facility] provider or service). Call [phone] if you haven't heard regarding these appointments within 7 days of discharge.     Activity    Your activity upon discharge: be as active as you can safely be. Talk to your primary care provider for guidance on reasonable activity levels.     When to contact your care team    Seek medical advice if you notice any new or concerning symptoms, or significant worsening of any current symptoms.     Diet    Eat foods that make you feel nourished, healthy, and energized. Bonus points if it's minimal added sugar and lots of fruits and veggies. Try to stay away from any added sugar in drinks (soda, juice, alcohol, energy drinks, etc). Pay attention to how you feel when you eat certain foods, and if they make you feel lousy, don't eat 'em. If your medical providers suggested other specific dietary recommendations, please abide by those as well.       Significant Results and Procedures   Results for orders placed or performed during the hospital encounter of 01/10/22 (from the past 24 hour(s))   CBC with platelets differential    Narrative    The following orders were created for panel order CBC with platelets differential.  Procedure                               Abnormality         Status                     ---------                               -----------         ------                     CBC with platelets and d...[[id number]]  Abnormal            Final result                 Please view results for these tests on the individual orders.   Comprehensive metabolic panel   Result Value Ref Range    Sodium 136 133 - 144 mmol/L    Potassium 4.1 3.4 - 5.3 mmol/L    Chloride 103 94 - 109 mmol/L    Carbon Dioxide (CO2) 24 20  - 32 mmol/L    Anion Gap 9 3 - 14 mmol/L    Urea Nitrogen 3 (L) 7 - 30 mg/dL    Creatinine 0.45 (L) 0.52 - 1.04 mg/dL    Calcium 8.7 8.5 - 10.1 mg/dL    Glucose 139 (H) 70 - 99 mg/dL    Alkaline Phosphatase 86 40 - 150 U/L    AST 16 0 - 45 U/L    ALT 17 0 - 50 U/L    Protein Total 6.7 (L) 6.8 - 8.8 g/dL    Albumin 2.4 (L) 3.4 - 5.0 g/dL    Bilirubin Total 0.3 0.2 - 1.3 mg/dL    GFR Estimate >90 >60 mL/min/1.73m2   Magnesium   Result Value Ref Range    Magnesium 2.0 1.6 - 2.3 mg/dL   Phosphorus   Result Value Ref Range    Phosphorus 3.4 2.5 - 4.5 mg/dL   CBC with platelets and differential   Result Value Ref Range    WBC Count 9.4 4.0 - 11.0 10e3/uL    RBC Count 3.62 (L) 3.80 - 5.20 10e6/uL    Hemoglobin 12.5 11.7 - 15.7 g/dL    Hematocrit 37.4 35.0 - 47.0 %     (H) 78 - 100 fL    MCH 34.5 (H) 26.5 - 33.0 pg    MCHC 33.4 31.5 - 36.5 g/dL    RDW 13.7 10.0 - 15.0 %    Platelet Count 275 150 - 450 10e3/uL    % Neutrophils 94 %    % Lymphocytes 3 %    % Monocytes 2 %    % Eosinophils 0 %    % Basophils 0 %    % Immature Granulocytes 1 %    NRBCs per 100 WBC 0 <1 /100    Absolute Neutrophils 8.8 (H) 1.6 - 8.3 10e3/uL    Absolute Lymphocytes 0.3 (L) 0.8 - 5.3 10e3/uL    Absolute Monocytes 0.2 0.0 - 1.3 10e3/uL    Absolute Eosinophils 0.0 0.0 - 0.7 10e3/uL    Absolute Basophils 0.0 0.0 - 0.2 10e3/uL    Absolute Immature Granulocytes 0.1 <=0.4 10e3/uL    Absolute NRBCs 0.0 10e3/uL         Discharge Medications   Discharge Medication List as of 1/11/2022  3:10 PM      START taking these medications    Details   !! acetaminophen (TYLENOL) 325 MG tablet Take 1-2 tablets (325-650 mg) by mouth every 6 hours as needed for mild pain, Disp-112 tablet, R-0, E-Prescribe      clindamycin (CLEOCIN) 300 MG capsule Take 1 capsule (300 mg) by mouth 3 times daily for 9 days, Disp-27 capsule, R-0, E-Prescribe      !! ibuprofen (ADVIL/MOTRIN) 600 MG tablet Take 1 tablet (600 mg) by mouth every 6 hours as needed for moderate pain,  Disp-56 tablet, R-0, E-Prescribe       !! - Potential duplicate medications found. Please discuss with provider.      CONTINUE these medications which have CHANGED    Details   fluconazole (DIFLUCAN) 100 MG tablet Take 1 tablet (100 mg) by mouth daily, HistoricalFinish course as indicated on prescription.         CONTINUE these medications which have NOT CHANGED    Details   !! acetaminophen (TYLENOL) 500 MG tablet Take 500-1,000 mg by mouth every 6 hours as needed for mild pain, Historical      atenolol (TENORMIN) 50 MG tablet Take 50 mg by mouth daily, Historical      atorvastatin (LIPITOR) 20 MG tablet Take 20 mg by mouth daily, Historical      !! ibuprofen (ADVIL/MOTRIN) 200 MG tablet Take 200 mg by mouth every 4 hours as needed for mild pain, Historical      levothyroxine (SYNTHROID/LEVOTHROID) 112 MCG tablet Take 112 mcg by mouth daily, Historical      oxyCODONE (ROXICODONE-INTENSOL) 20 mg/mL CONC (HIGH CONC) solution Take 5 mg by mouth every 6 hours as needed for moderate to severe pain, Historical      PARoxetine (PAXIL) 10 MG tablet Take 1 tablet by mouth daily, Historical       !! - Potential duplicate medications found. Please discuss with provider.      STOP taking these medications       cephALEXin (KEFLEX) 500 MG capsule Comments:   Reason for Stopping:         metroNIDAZOLE (FLAGYL) 500 MG tablet Comments:   Reason for Stopping:             Allergies   Allergies   Allergen Reactions     Sulfa Drugs      Penicillins Rash

## 2022-01-11 NOTE — DISCHARGE INSTRUCTIONS
Diet: Soft diet, advance as tolerated  Activity: up ad donald, light exercise  Drains: Keep drains in place until appointment with [facility] on Thursday (they will help to coordinate). Can place gauze with tape over the area or continue to use neuro netting  Antibiotics: 300 mg clindamycin TID for 10 days  Pain control: acetaminophen, ibuprofen, oxycodone for possible break through pain

## 2022-01-11 NOTE — ANESTHESIA CARE TRANSFER NOTE
Patient: Lara Melendez    Procedure: Procedure(s):  INCISION AND DRAINAGE, MANDIBLE       Diagnosis: Abscess of left jaw [M27.2]  Diagnosis Additional Information: No value filed.    Anesthesia Type:   General     Note:    Oropharynx: oropharynx clear of all foreign objects  Level of Consciousness: awake  Oxygen Supplementation: nasal cannula  Level of Supplemental Oxygen (L/min / FiO2): 3 LPM  Independent Airway: airway patency satisfactory and stable  Dentition: dentition unchanged  Vital Signs Stable: post-procedure vital signs reviewed and stable  Report to RN Given: handoff report given  Patient transferred to: PACU    Handoff Report: Identifed the Patient, Identified the Reponsible Provider, Reviewed the pertinent medical history, Discussed the surgical course, Reviewed Intra-OP anesthesia mangement and issues during anesthesia, Set expectations for post-procedure period and Allowed opportunity for questions and acknowledgement of understanding      Vitals:  Vitals Value Taken Time   /93 01/10/22 1913   Temp 37.8  C (100  F) 01/10/22 1913   Pulse 105 01/10/22 1913   Resp 18 01/10/22 1913   SpO2 99 % 01/10/22 1913   Vitals shown include unvalidated device data.    Electronically Signed By: ZACHARY Posada CRNA  January 10, 2022  7:14 PM

## 2022-01-11 NOTE — ANESTHESIA PROCEDURE NOTES
Airway       Patient location during procedure: OR       Procedure Start/Stop Times: 1/10/2022 6:27 PM and 1/10/2022 6:30 PM  Staff -        CRNA: Yaniv Moser APRN CRNA       Performed By: CRNA  Consent for Airway        Urgency: elective  Indications and Patient Condition       Indications for airway management: analia-procedural       Induction type:intravenous       Mask difficulty assessment: 2 - vent by mask + OA or adjuvant +/- NMBA    Final Airway Details       Final airway type: endotracheal airway       Successful airway: ETT - single  Endotracheal Airway Details        ETT size (mm): 7.0       Cuffed: yes       Successful intubation technique: direct laryngoscopy       DL Blade Type: Jo 2       Grade View of Cords: 1       Adjucts: stylet       Position: Right       Measured from: gums/teeth       Secured at (cm): 21       Bite block used: None    Post intubation assessment        Placement verified by: capnometry, equal breath sounds and chest rise        Number of attempts at approach: 1       Number of other approaches attempted: 0       Secured with: pink tape       Ease of procedure: easy       Dentition: Intact

## 2022-01-11 NOTE — PLAN OF CARE
"Blood pressure 133/77, pulse 79, temperature 98.4  F (36.9  C), temperature source Oral, resp. rate 22, height 1.626 m (5' 4\"), weight 60.7 kg (133 lb 14.4 oz), SpO2 95 %.RA.        Patient A & O X 4, VSS no respiratory distress noted. Left Jaw pain manageable with Tylenol scheduled . Offered Oxycodone patient declined. Patient tolerating soft diet. Up with SBA . Void adequate. Patient stable , status changed to observations handout given and reviewed instructions with Patient Patient verbalized understanding. Also patient has discharge order to go home . Patient aware and agreeable with plan. Writer reviewed discharge instructions with patient and instruct how to care for incisions , to follow up with appointment as scheduled . Patient verbalized understanding . Writer asked patient if she has concerns or questions . Patient report no questions or concerns. Patient left the unit at ~ 1630 via wheelchair escorted by [facility] to [facility]  is waiting in [facility] . Patient took all her belonging with.         "

## 2022-01-11 NOTE — SUMMARY OF CARE
Reason for admission: L jaw absess   Admitted from: PACU  Report received from: Selam  Care plan (primary problem) and Education initiated: yes  Detailed Belongings: phone, silver purse, home medications, ankle boot/slipper. Black jacket, clothes

## 2022-01-11 NOTE — SUMMARY OF CARE
2 RN skin assessment completed by: Greta CERVANTES RN & Jamie DELONG RN: blanchable redness to bilateral heels. Surgical wound to L jaw. No other issues noted.

## 2022-01-11 NOTE — SUMMARY OF CARE
Pt arrived to  B with the following belongings; medications, sweat pants, socks, sweatshirt, T-shirt, boots, and jacket. Pt also has purse filled with a wallet, checkbook, credit cards, and pt's cell phone and car keys, house keys, and reading glasses.

## 2022-01-11 NOTE — OP NOTE
Oral & Maxillofacial Surgery Operative Note:      Procedure:   -Trans cutaneous incision and drainage of left masseteric, sublingual, submandibular, and lateral pharyngeal space infection with placement of drain. Cultures obtained     Pre-Operative Diagnosis:   Left sublingual and submandibular space infection     Post-Operative Diagnosis: Same      STAFF SURGEON: Jn Feliz DDS, MD, FACS  RESIDENT SURGEON: Nicole Wang DDS     PERIOPERATIVE ANTIBIOTICS: Clindamycin      ESTIMATED BLOOD LOSS: 25 ml     URINE OUTPUT: None.     LOCAL ANESTHESIA: 1 cc total of 0.5% bupivacaine with 1:200,000 epinephrine delivered subcutaneously      SPECIMENS: Cultures sent     COMPLICATIONS: None     DRAINS: 2x extraoral drains placed, penrose drain sutured in place     INDICATIONS FOR PROCEDURE: 57 y/o female with a history of extraction tooth #17 and submandibular/sublingual abscess referred for extraoral incision and draiange. Patient was referred due to medical comorbidities and limited cooperation, as they were not able to obtain appropriate imaging at the outside dental office. Due to these factors, it was decided that the patient would be best served in the OR under general anesthesia.       DESCRIPTION OF PROCEDURE: The patient was met preoperatively and the treatment plan was confirmed with the patient.  Patient was brought back to operating room by the Anesthesia Service. Patient transferred himself to the table and was induced to general anesthesia. Following induction of anesthesia, an oral endotracheal tube was placed and taped  by the Anesthesia Service. The patient's arms were tucked and padded. The face was painted in usual sterile fashion.  Surgeons stepped out to scrub and returned to don sterile gown and gloves. At that time, the surgical site was squared off and a split sheet drape was placed.      First attention to the left sublingual space. Noted swelling in the submandibular and sublingual spaces.   An 18 gauge needle was utilized to aspirate the swelling with purulent aspirate which was sent for culture. A moderate amount of aspiration was obtained and sent for culture. A 15 blade was used to make an incision through skin and subcutaneous layers. A curved hemostat was then utilized to bluntly dissect down to mandibular inferior border. Hemostat was utilized to open sublingual, submandibular and buccal aspect of mandible. Copious amount of purulence drainage was expelled. Irrigated area with large volume of sterile saline. Two drains were placed in sublingual and submandibular spaces, sutures with 2-0 silk.     Fluffs placed over drains and neuro netting around jaw.     The patient was turned over to the Anesthesia Service and was awakened in the OR and transferred stable to the PACU.     Attending surgeon was present for the entirety of the procedure.     Complications: None.      Nicole Wang DDS  [facility] resident

## 2022-01-11 NOTE — PLAN OF CARE
RN assumed cares at 2015-2330    Vitals: Temp: 99.2  F (37.3  C) Temp src: Oral BP: 137/85 Pulse: 98   Resp: 20 SpO2: 95 % O2 Device: Nasal cannula Oxygen Delivery: 2 LPM   Pain: none reported, got a lot of pain medications in surgery and PACU  Neuro: A/O  Cardiac: WDL  Respiratory: 2L capno   GI/: WDL  IV/Drains: 1 PIV infusing, 2 penrose drains in neck  Activity: up to bathroom this shift, SBA to assistive 1 to manage cords and IV pole  Skin: Incision on neck    Plan of Care:  Continue to monitor and contact MD as needed

## 2022-01-11 NOTE — ANESTHESIA PREPROCEDURE EVALUATION
Anesthesia Pre-Procedure Evaluation    Patient: Lara Melendez   MRN: [id number] : 1965        Preoperative Diagnosis: Abscess of left jaw [M27.2]    Procedure : Procedure(s):  INCISION AND DRAINAGE, MANDIBLE, EXTRACTION OF TEETH AS INDICATED, DEEP TISSUE OR BONE BIOPSY, OBTAINING ASPIRATE CULTURES.          Past Medical History:   Diagnosis Date     Anxiety      Elevated cholesterol      Hypertension      Thyroid disease       Past Surgical History:   Procedure Laterality Date     CHOLECYSTECTOMY       ENT SURGERY      tooth extraction      Allergies   Allergen Reactions     Sulfa Drugs      Penicillins Rash      Social History     Tobacco Use     Smoking status: Current Every Day Smoker     Smokeless tobacco: Never Used   Substance Use Topics     Alcohol use: Yes      Wt Readings from Last 1 Encounters:   01/10/22 58.6 kg (129 lb 1.6 oz)        Anesthesia Evaluation            ROS/MED HX  ENT/Pulmonary:       Neurologic:       Cardiovascular:     (+) Dyslipidemia hypertension----- (-) murmur and wheezes   METS/Exercise Tolerance:     Hematologic:       Musculoskeletal:       GI/Hepatic:       Renal/Genitourinary:       Endo:     (+) thyroid problem,     Psychiatric/Substance Use:       Infectious Disease:       Malignancy:       Other:            Physical Exam    Airway        Mallampati: II   TM distance: > 3 FB   Neck ROM: full   Mouth opening: > 3 cm    Respiratory Devices and Support         Dental  no notable dental history         Cardiovascular          Rhythm and rate: regular and normal (-) no systolic click and no murmur    Pulmonary   pulmonary exam normal        breath sounds clear to auscultation   (-) no wheezes        OUTSIDE LABS:  CBC:   Lab Results   Component Value Date    WBC 16.1 (H) 01/10/2022    WBC 6.2 2006    HGB 14.9 01/10/2022    HGB 10.8 (L) 2006    HCT 44.0 01/10/2022    HCT 30.4 (L) 2006     01/10/2022     (L) 2006     BMP:   Lab Results    Component Value Date     (L) 01/10/2022     02/02/2006    POTASSIUM 3.7 01/10/2022    POTASSIUM 3.4 02/02/2006    CHLORIDE 99 01/10/2022    CHLORIDE 109 02/02/2006    CO2 20 01/10/2022    CO2 17 (L) 02/02/2006    BUN 7 01/10/2022    BUN <2 (L) 02/02/2006    CR 0.47 (L) 01/10/2022    CR 0.80 02/02/2006     (H) 01/10/2022    GLC 90 02/02/2006     COAGS: No results found for: PTT, INR, FIBR  POC:   Lab Results   Component Value Date     (H) 01/30/2006    HCG Negative 01/29/2006     HEPATIC:   Lab Results   Component Value Date    ALBUMIN 3.0 (L) 01/10/2022    PROTTOTAL 7.9 01/10/2022    ALT 20 01/10/2022    AST 14 01/10/2022    ALKPHOS 107 01/10/2022    BILITOTAL 0.3 01/10/2022     OTHER:   Lab Results   Component Value Date    LACT 1.2 01/10/2022    EDIN 9.2 01/10/2022    PHOS 3.1 01/30/2006    MAG 2.0 01/31/2006    TSH 4.68 01/29/2006    .0 (H) 01/10/2022       Anesthesia Plan    ASA Status:  2   NPO Status:  NPO Appropriate    Anesthesia Type: General.     - Airway: ETT   Induction: Intravenous.   Maintenance: Inhalation.   Techniques and Equipment:     - Airway: Nasal JIM, Video-Laryngoscope         Consents    Anesthesia Plan(s) and associated risks, benefits, and realistic alternatives discussed. Questions answered and patient/representative(s) expressed understanding.    - Discussed:     - Discussed with:  Patient      - Extended Intubation/Ventilatory Support Discussed: Yes.      - Patient is DNR/DNI Status: No    Use of blood products discussed: Yes.     - Discussed with: Patient.     Postoperative Care    Pain management: IV analgesics.   PONV prophylaxis: Ondansetron (or other 5HT-3), Dexamethasone or Solumedrol     Comments:                Christopher J. Behrens, MD

## 2022-01-11 NOTE — PROGRESS NOTES
ORAL & MAXILLOFACIAL SURGERY   PROGRESS NOTE  Lara Melendez,  MRN: [id number],  : 1965           ASSESSMENT:  56 year old female with submandibular abscess s/p tooth extraction on 21. Patient presented to the ED with significant submandibular and bilateral sublingual edema and was brought to the OR on 1/10/22 for incision and drainage. Patient had two drains placed at that time and admitted for IV antibiotics and post operative management.     PLAN:  Diet: Soft diet, advance as tolerated  Activity: up ad donald  DVT prophylaxis: patient encouraged to do light exercise, not indicated   HEENT: extraoral incision and drainage of left submandibular space, 2x drains present, will stay in until appointment with [facility] on Thursday (we will help to coordinate). Can place gauze with tape over the area or continue to use neuro netting  Antibiotics: 300 mg clindamycin TID for 10 days  Pain control: acetaminophen, ibuprofen, oxycodone for possible break through pain  Imaging: none needed before discharge, will obtain imaging at follow up appointment   Labs needed: none  Disposition: home, OK for discharge from Oral Surgery perspective       Discussed with chief resident and staff.    Nicole Wang DDS  Oral & Maxillofacial Surgery, PGY-2      Please contact the OMFS resident on-call with questions or concerns.  ____________________________________      SUBJECTIVE:  NAEO. Pt has ambulated, voided, tolerated PO, pain well controlled     PHYSICAL EXAM:   GEN: NAD, female resting comfortably in bed , patient endorses that she is feeling well  HEAD: drains (x2) in place, gauze dressing and neuro netting present, scant drainage  EYES: PERRL, EOMI, visual acuity intact  EARS: Atraumatic, hearing at conversational levels  NOSE: WNL, no drainage   MAXILLOFACIAL: NC/AT, EOMI, PERRL, moderate submandibular drainage, greatly improved from yesterday, soft, no erythema. Maxilla stable, mandible stable, midline  coincident, maxillay cap refill <2 sec and well perfused, vestibular incisions c/h/i, no dehiscence.  CV: RRR, no M/G/R  PULM: CTAB, breathing comfortably on room air  GI: Soft, NT, bowel sounds present  NEURO: AAOx4, CN V2, V3 hypoesthesia c/w procedure, CN VII grossly intact bilaterally    LABS:   CBC RESULTS: Recent Labs   Lab Test 01/10/22  0547   WBC 16.1*   RBC 4.32   HGB 14.9   HCT 44.0   *   MCH 34.5*   MCHC 33.9   RDW 13.4         Last Comprehensive Metabolic Panel:  Sodium   Date Value Ref Range Status   01/10/2022 127 (L) 133 - 144 mmol/L Final   02/02/2006 133 133 - 144 mmol/L Final     Potassium   Date Value Ref Range Status   01/10/2022 3.7 3.4 - 5.3 mmol/L Final   02/02/2006 3.4 3.4 - 5.3 mmol/L Final     Chloride   Date Value Ref Range Status   01/10/2022 99 94 - 109 mmol/L Final   02/02/2006 109 94 - 109 mmol/L Final     Carbon Dioxide   Date Value Ref Range Status   02/02/2006 17 (L) 20 - 32 mmol/L Final     Carbon Dioxide (CO2)   Date Value Ref Range Status   01/10/2022 20 20 - 32 mmol/L Final     Anion Gap   Date Value Ref Range Status   01/10/2022 8 3 - 14 mmol/L Final   02/02/2006 7 6 - 17 mmol/L Final     Glucose   Date Value Ref Range Status   01/10/2022 105 (H) 70 - 99 mg/dL Final   02/02/2006 90 60 - 110 mg/dL Final     GLUCOSE BY METER POCT   Date Value Ref Range Status   01/10/2022 94 70 - 99 mg/dL Final     Urea Nitrogen   Date Value Ref Range Status   01/10/2022 7 7 - 30 mg/dL Final   02/02/2006 <2 (L) 5 - 24 mg/dL Final     Creatinine   Date Value Ref Range Status   01/10/2022 0.47 (L) 0.52 - 1.04 mg/dL Final   02/02/2006 0.80 0.60 - 1.30 mg/dL Final     GFR Estimate   Date Value Ref Range Status   01/10/2022 >90 >60 mL/min/1.73m2 Final     Comment:     Effective December 21, 2021 eGFRcr in adults is calculated using the 2021 CKD-EPI creatinine equation which includes age and gender (Wally et al., NEJM, DOI: 10.1056/NIOReq5118135)   02/02/2006 84 >60 mL/min/1.7m2 Final      Calcium   Date Value Ref Range Status   01/10/2022 9.2 8.5 - 10.1 mg/dL Final   02/02/2006 7.8 (L) 8.5 - 10.4 mg/dL Final

## 2022-01-11 NOTE — ANESTHESIA POSTPROCEDURE EVALUATION
Patient: Lara Melendez    Procedure: Procedure(s):  INCISION AND DRAINAGE, MANDIBLE       Diagnosis:Abscess of left jaw [M27.2]  Diagnosis Additional Information: No value filed.    Anesthesia Type:  General    Note:  Disposition: Admission   Postop Pain Control: Uneventful            Sign Out: Well controlled pain   PONV: No   Neuro/Psych: Uneventful            Sign Out: Acceptable/Baseline neuro status   Airway/Respiratory: Uneventful            Sign Out: Acceptable/Baseline resp. status   CV/Hemodynamics: Uneventful            Sign Out: Acceptable CV status   Other NRE: NONE   DID A NON-ROUTINE EVENT OCCUR? No           Last vitals:  Vitals Value Taken Time   /91 01/10/22 1950   Temp 37.9  C (100.22  F) 01/10/22 2000   Pulse 104 01/10/22 2000   Resp 17 01/10/22 1930   SpO2 96 % 01/10/22 2005   Vitals shown include unvalidated device data.    Electronically Signed By: Christopher J. Behrens, MD  January 11, 2022  6:27 AM

## 2022-01-11 NOTE — UTILIZATION REVIEW
"[facility]    Admission Status; Secondary Review Determination     Admission Date: 1/10/2022  5:23 AM      Under the authority of the Utilization Management Committee, the utilization review process indicated a secondary review on the above patient.  The review outcome is based on review of the medical records, discussions with staff, and applying clinical experience noted on the date of the review.          (x) Observation Status Appropriate - This patient does not meet hospital inpatient criteria and is placed in observation status. If this patient's primary payer is Medicare and was admitted as an inpatient, Condition Code 44 should be used and patient status changed to \"observation\".     RATIONALE FOR DETERMINATION   56-year-old female with a history of hypertension, tobacco use, hyperlipidemia, and hypothyroidism was admitted on 1/10 with a facial abscess related to odontogenic infection.  She was started on IV clindamycin as well as fluconazole orally as well as IV dexamethasone for management of inflammation.  Analgesics were made available.  She had no evidence of sepsis or instability.  Lactic acid was normal.  She underwent left jaw abscess I&D and tolerated the procedure well without complications.  At the time of this review there is no medical necessity for inpatient hospitalization and observation status is recommended.    The severity of illness, intensity of service provided, expected LOS and risk for adverse outcome make the care appropriate for further observation; however, doesn't meet criteria for hospital inpatient admission.  Dr Bates was paged and notified of this determination.        The information on this document is developed by the utilization review team in order for the business office to ensure compliance.  This only denotes the appropriateness of proper admission status and does not reflect the quality of care rendered.         The definitions of Inpatient Status and Observation Status used " in making the determination above are those provided in the CMS Coverage Manual, Chapter 1 and Chapter 6, section 70.4.      Sincerely,     Kody Prieto DO MPH   Physician Advisor  Utilization Review  [facility]

## 2022-01-11 NOTE — BRIEF OP NOTE
[facility]    Brief Operative Note    Pre-operative diagnosis: Abscess of left jaw [M27.2]  Post-operative diagnosis Same as pre-operative diagnosis    Procedure: Procedure(s):  INCISION AND DRAINAGE, MANDIBLE  Surgeon: Surgeon(s) and Role:     * Jn Feliz DDS - Primary     * Annalise Sellers MD - Resident - Assisting  Anesthesia: General   Estimated Blood Loss: Less than 50 ml    Drains:   2x drains left mandible     Specimens:   ID Type Source Tests Collected by Time Destination   A : Aspirate Left neck Abscess Neck, Left ANAEROBIC BACTERIAL CULTURE ROUTINE, GRAM STAIN, FUNGAL OR YEAST CULTURE ROUTINE, AEROBIC BACTERIAL CULTURE ROUTINE Jn Feliz DDS 1/10/2022  6:12 PM      Findings:   None.  Complications: None.  Implants: * No implants in log *     Plan:  - HOB >30 degrees  - Soft diet, advance as tolerated  - 2x drains on left mandible, fluffs can be changed as saturated   - Cultures obtained, will monitor

## 2022-01-11 NOTE — PROGRESS NOTES
"[facility]   POST OP CHECK    SUBJECTIVE      Patient s/p left mandible I&D per OMFS. Patient in PACU, awake and interactive. No respiratory symptoms or distress at this time. Able to open mouth about the same as previous. Mouth dry, otherwise no new symptoms.       OBJECTIVE:  BP (!) 161/93   Pulse 105   Temp 100  F (37.8  C) (Core)   Resp 18   Ht 1.626 m (5' 4\")   Wt 58.6 kg (129 lb 1.6 oz)   SpO2 99%   BMI 22.16 kg/m      Exam:   General: Alert and oriented, in no acute distress.  Skin: Warm and dry. Large dressing over left mandible with bloody drainage obvious  Eyes: Extra-ocular muscles intact  ENT: Speech intact, nasal passages open with capno NC in place, no hearing impairment noted.  CV: No cyanosis or pallor, warm and well perfused. RRR  Respiratory: No respiratory distress, no accessory muscle use. Clear to auscultation  Neuro: comprehension intact. Gross and fine motor skills intact.   Psychiatric: Mood and affect appear normal.   Extremities: Warm, able to move all four extremities at will.      ASSESSMENT/PLAN:    # Left jaw abscess  # S/p I&D left mandible abscess  # Superimposed cellulitis  Patient is s/p left mandible abscess I&D per OMFS 1/10/22. No complications noted in op note. Patient alert in PACU and interactive. No respiratory symptoms at this time. Pain well controlled. Patient thirsty.     1. Intraoperative cultures pending.   2. Per OMFS recs:  a. HOB >30  b. Soft diet tonight (can ADAT tomorrow)  c. Drain cares with fluffs to be changed when saturated  3. Pain control  a. Oral tylenol scheduled  b. IV dilaudid available  4. Clindamycin and fluconazole for now, pending culture results.   5. Transfer to floor when bed available.     Please see daily rounding note for full A/P.   Danae Aaron MD  7:35 PM    "

## 2022-01-13 LAB
BACTERIA ABSC ANAEROBE+AEROBE CULT: ABNORMAL
BACTERIA ABSC ANAEROBE+AEROBE CULT: ABNORMAL

## 2022-01-15 LAB
BACTERIA BLD CULT: NO GROWTH
BACTERIA BLD CULT: NO GROWTH

## 2022-01-17 LAB
BACTERIA ABSC ANAEROBE+AEROBE CULT: ABNORMAL

## 2022-02-03 ENCOUNTER — HOSPITAL ENCOUNTER (EMERGENCY)
Facility: CLINIC | Age: 57
Discharge: ANOTHER HOSPITAL | End: 2022-02-04
Attending: EMERGENCY MEDICINE | Admitting: EMERGENCY MEDICINE
Payer: COMMERCIAL

## 2022-02-03 ENCOUNTER — APPOINTMENT (OUTPATIENT)
Dept: GENERAL RADIOLOGY | Facility: CLINIC | Age: 57
End: 2022-02-03
Attending: EMERGENCY MEDICINE
Payer: COMMERCIAL

## 2022-02-03 ENCOUNTER — APPOINTMENT (OUTPATIENT)
Dept: CT IMAGING | Facility: CLINIC | Age: 57
End: 2022-02-03
Attending: EMERGENCY MEDICINE
Payer: COMMERCIAL

## 2022-02-03 DIAGNOSIS — M27.2 ABSCESS OF JAW, LEFT: ICD-10-CM

## 2022-02-03 DIAGNOSIS — J44.1 COPD EXACERBATION (H): ICD-10-CM

## 2022-02-03 LAB
ANION GAP SERPL CALCULATED.3IONS-SCNC: 5 MMOL/L (ref 3–14)
BASOPHILS # BLD AUTO: 0 10E3/UL (ref 0–0.2)
BASOPHILS NFR BLD AUTO: 0 %
BUN SERPL-MCNC: 7 MG/DL (ref 7–30)
CALCIUM SERPL-MCNC: 9 MG/DL (ref 8.5–10.1)
CHLORIDE BLD-SCNC: 100 MMOL/L (ref 94–109)
CO2 SERPL-SCNC: 26 MMOL/L (ref 20–32)
CREAT SERPL-MCNC: 0.5 MG/DL (ref 0.52–1.04)
EOSINOPHIL # BLD AUTO: 0.3 10E3/UL (ref 0–0.7)
EOSINOPHIL NFR BLD AUTO: 5 %
ERYTHROCYTE [DISTWIDTH] IN BLOOD BY AUTOMATED COUNT: 12.5 % (ref 10–15)
FLUAV RNA SPEC QL NAA+PROBE: NEGATIVE
FLUBV RNA RESP QL NAA+PROBE: NEGATIVE
GFR SERPL CREATININE-BSD FRML MDRD: >90 ML/MIN/1.73M2
GLUCOSE BLD-MCNC: 101 MG/DL (ref 70–99)
HCT VFR BLD AUTO: 37.1 % (ref 35–47)
HGB BLD-MCNC: 12.4 G/DL (ref 11.7–15.7)
IMM GRANULOCYTES # BLD: 0.1 10E3/UL
IMM GRANULOCYTES NFR BLD: 1 %
LYMPHOCYTES # BLD AUTO: 1.9 10E3/UL (ref 0.8–5.3)
LYMPHOCYTES NFR BLD AUTO: 29 %
MCH RBC QN AUTO: 34.2 PG (ref 26.5–33)
MCHC RBC AUTO-ENTMCNC: 33.4 G/DL (ref 31.5–36.5)
MCV RBC AUTO: 102 FL (ref 78–100)
MONOCYTES # BLD AUTO: 0.7 10E3/UL (ref 0–1.3)
MONOCYTES NFR BLD AUTO: 10 %
NEUTROPHILS # BLD AUTO: 3.7 10E3/UL (ref 1.6–8.3)
NEUTROPHILS NFR BLD AUTO: 55 %
NRBC # BLD AUTO: 0 10E3/UL
NRBC BLD AUTO-RTO: 0 /100
PLATELET # BLD AUTO: 286 10E3/UL (ref 150–450)
POTASSIUM BLD-SCNC: 3.4 MMOL/L (ref 3.4–5.3)
RBC # BLD AUTO: 3.63 10E6/UL (ref 3.8–5.2)
SARS-COV-2 RNA RESP QL NAA+PROBE: NEGATIVE
SODIUM SERPL-SCNC: 131 MMOL/L (ref 133–144)
WBC # BLD AUTO: 6.6 10E3/UL (ref 4–11)

## 2022-02-03 PROCEDURE — 999N000105 HC STATISTIC NO DOCUMENTATION TO SUPPORT CHARGE

## 2022-02-03 PROCEDURE — 250N000013 HC RX MED GY IP 250 OP 250 PS 637: Performed by: EMERGENCY MEDICINE

## 2022-02-03 PROCEDURE — 36415 COLL VENOUS BLD VENIPUNCTURE: CPT | Performed by: EMERGENCY MEDICINE

## 2022-02-03 PROCEDURE — 94640 AIRWAY INHALATION TREATMENT: CPT

## 2022-02-03 PROCEDURE — 71045 X-RAY EXAM CHEST 1 VIEW: CPT

## 2022-02-03 PROCEDURE — 250N000011 HC RX IP 250 OP 636: Performed by: EMERGENCY MEDICINE

## 2022-02-03 PROCEDURE — 85025 COMPLETE CBC W/AUTO DIFF WBC: CPT | Performed by: EMERGENCY MEDICINE

## 2022-02-03 PROCEDURE — 82310 ASSAY OF CALCIUM: CPT | Performed by: EMERGENCY MEDICINE

## 2022-02-03 PROCEDURE — 87040 BLOOD CULTURE FOR BACTERIA: CPT | Performed by: EMERGENCY MEDICINE

## 2022-02-03 PROCEDURE — 70491 CT SOFT TISSUE NECK W/DYE: CPT

## 2022-02-03 PROCEDURE — 96365 THER/PROPH/DIAG IV INF INIT: CPT | Mod: 59

## 2022-02-03 PROCEDURE — 96361 HYDRATE IV INFUSION ADD-ON: CPT

## 2022-02-03 PROCEDURE — C9803 HOPD COVID-19 SPEC COLLECT: HCPCS

## 2022-02-03 PROCEDURE — 93005 ELECTROCARDIOGRAM TRACING: CPT

## 2022-02-03 PROCEDURE — 99285 EMERGENCY DEPT VISIT HI MDM: CPT | Mod: 25

## 2022-02-03 PROCEDURE — 87636 SARSCOV2 & INF A&B AMP PRB: CPT | Performed by: EMERGENCY MEDICINE

## 2022-02-03 PROCEDURE — 96375 TX/PRO/DX INJ NEW DRUG ADDON: CPT | Mod: 59

## 2022-02-03 RX ORDER — ALBUTEROL SULFATE 90 UG/1
6 AEROSOL, METERED RESPIRATORY (INHALATION)
Status: DISCONTINUED | OUTPATIENT
Start: 2022-02-03 | End: 2022-02-04 | Stop reason: HOSPADM

## 2022-02-03 RX ORDER — IOPAMIDOL 755 MG/ML
500 INJECTION, SOLUTION INTRAVASCULAR ONCE
Status: COMPLETED | OUTPATIENT
Start: 2022-02-03 | End: 2022-02-03

## 2022-02-03 RX ORDER — CLINDAMYCIN PHOSPHATE 900 MG/50ML
900 INJECTION, SOLUTION INTRAVENOUS ONCE
Status: COMPLETED | OUTPATIENT
Start: 2022-02-03 | End: 2022-02-04

## 2022-02-03 RX ORDER — METOCLOPRAMIDE HYDROCHLORIDE 5 MG/ML
10 INJECTION INTRAMUSCULAR; INTRAVENOUS ONCE
Status: DISCONTINUED | OUTPATIENT
Start: 2022-02-03 | End: 2022-02-03

## 2022-02-03 RX ORDER — DEXAMETHASONE SODIUM PHOSPHATE 10 MG/ML
10 INJECTION, SOLUTION INTRAMUSCULAR; INTRAVENOUS ONCE
Status: COMPLETED | OUTPATIENT
Start: 2022-02-03 | End: 2022-02-03

## 2022-02-03 RX ORDER — METHYLPREDNISOLONE SODIUM SUCCINATE 125 MG/2ML
125 INJECTION, POWDER, LYOPHILIZED, FOR SOLUTION INTRAMUSCULAR; INTRAVENOUS ONCE
Status: COMPLETED | OUTPATIENT
Start: 2022-02-03 | End: 2022-02-03

## 2022-02-03 RX ORDER — ALBUTEROL SULFATE 90 UG/1
6 AEROSOL, METERED RESPIRATORY (INHALATION) ONCE
Status: COMPLETED | OUTPATIENT
Start: 2022-02-03 | End: 2022-02-03

## 2022-02-03 RX ADMIN — IOPAMIDOL 80 ML: 755 INJECTION, SOLUTION INTRAVENOUS at 22:01

## 2022-02-03 RX ADMIN — CLINDAMYCIN PHOSPHATE 900 MG: 900 INJECTION, SOLUTION INTRAVENOUS at 23:53

## 2022-02-03 RX ADMIN — METHYLPREDNISOLONE SODIUM SUCCINATE 125 MG: 125 INJECTION, POWDER, FOR SOLUTION INTRAMUSCULAR; INTRAVENOUS at 20:48

## 2022-02-03 RX ADMIN — ALBUTEROL SULFATE 6 PUFF: 90 AEROSOL, METERED RESPIRATORY (INHALATION) at 21:25

## 2022-02-03 RX ADMIN — DEXAMETHASONE SODIUM PHOSPHATE 10 MG: 10 INJECTION, SOLUTION INTRAMUSCULAR; INTRAVENOUS at 23:53

## 2022-02-03 RX ADMIN — ALBUTEROL SULFATE 6 PUFF: 90 AEROSOL, METERED RESPIRATORY (INHALATION) at 20:49

## 2022-02-04 ENCOUNTER — ANESTHESIA (OUTPATIENT)
Dept: SURGERY | Facility: CLINIC | Age: 57
End: 2022-02-04
Payer: COMMERCIAL

## 2022-02-04 ENCOUNTER — ANESTHESIA EVENT (OUTPATIENT)
Dept: SURGERY | Facility: CLINIC | Age: 57
End: 2022-02-04
Payer: COMMERCIAL

## 2022-02-04 ENCOUNTER — HOSPITAL ENCOUNTER (INPATIENT)
Facility: CLINIC | Age: 57
LOS: 3 days | Discharge: HOME | End: 2022-02-07
Attending: EMERGENCY MEDICINE | Admitting: STUDENT IN AN ORGANIZED HEALTH CARE EDUCATION/TRAINING PROGRAM
Payer: COMMERCIAL

## 2022-02-04 VITALS
SYSTOLIC BLOOD PRESSURE: 141 MMHG | OXYGEN SATURATION: 96 % | TEMPERATURE: 98.2 F | WEIGHT: 130 LBS | DIASTOLIC BLOOD PRESSURE: 99 MMHG | BODY MASS INDEX: 22.31 KG/M2 | RESPIRATION RATE: 21 BRPM | HEART RATE: 89 BPM

## 2022-02-04 DIAGNOSIS — Z11.52 ENCOUNTER FOR SCREENING LABORATORY TESTING FOR SEVERE ACUTE RESPIRATORY SYNDROME CORONAVIRUS 2 (SARS-COV-2): ICD-10-CM

## 2022-02-04 DIAGNOSIS — J44.9 CHRONIC OBSTRUCTIVE PULMONARY DISEASE, UNSPECIFIED COPD TYPE (H): ICD-10-CM

## 2022-02-04 DIAGNOSIS — M27.2 ABSCESS OF LEFT JAW: ICD-10-CM

## 2022-02-04 DIAGNOSIS — R06.00 DYSPNEA, UNSPECIFIED TYPE: ICD-10-CM

## 2022-02-04 DIAGNOSIS — F17.210 CIGARETTE SMOKER: ICD-10-CM

## 2022-02-04 DIAGNOSIS — M27.2 MANDIBULAR ABSCESS: ICD-10-CM

## 2022-02-04 LAB
ATRIAL RATE - MUSE: 85 BPM
DIASTOLIC BLOOD PRESSURE - MUSE: NORMAL MMHG
GLUCOSE BLDC GLUCOMTR-MCNC: 139 MG/DL (ref 70–99)
GRAM STAIN RESULT: ABNORMAL
INTERPRETATION ECG - MUSE: NORMAL
P AXIS - MUSE: NORMAL DEGREES
PR INTERVAL - MUSE: 176 MS
QRS DURATION - MUSE: 78 MS
QT - MUSE: 378 MS
QTC - MUSE: 449 MS
R AXIS - MUSE: 60 DEGREES
SYSTOLIC BLOOD PRESSURE - MUSE: NORMAL MMHG
T AXIS - MUSE: -1 DEGREES
VENTRICULAR RATE- MUSE: 85 BPM

## 2022-02-04 PROCEDURE — 250N000009 HC RX 250

## 2022-02-04 PROCEDURE — 99285 EMERGENCY DEPT VISIT HI MDM: CPT | Mod: 25 | Performed by: EMERGENCY MEDICINE

## 2022-02-04 PROCEDURE — 96374 THER/PROPH/DIAG INJ IV PUSH: CPT | Performed by: EMERGENCY MEDICINE

## 2022-02-04 PROCEDURE — 250N000011 HC RX IP 250 OP 636

## 2022-02-04 PROCEDURE — 258N000003 HC RX IP 258 OP 636

## 2022-02-04 PROCEDURE — 250N000013 HC RX MED GY IP 250 OP 250 PS 637: Performed by: STUDENT IN AN ORGANIZED HEALTH CARE EDUCATION/TRAINING PROGRAM

## 2022-02-04 PROCEDURE — 250N000009 HC RX 250: Performed by: EMERGENCY MEDICINE

## 2022-02-04 PROCEDURE — 250N000025 HC SEVOFLURANE, PER MIN: Performed by: DENTIST

## 2022-02-04 PROCEDURE — 88311 DECALCIFY TISSUE: CPT | Mod: 26 | Performed by: PATHOLOGY

## 2022-02-04 PROCEDURE — 250N000011 HC RX IP 250 OP 636: Performed by: EMERGENCY MEDICINE

## 2022-02-04 PROCEDURE — 250N000013 HC RX MED GY IP 250 OP 250 PS 637: Performed by: DENTIST

## 2022-02-04 PROCEDURE — 250N000011 HC RX IP 250 OP 636: Performed by: STUDENT IN AN ORGANIZED HEALTH CARE EDUCATION/TRAINING PROGRAM

## 2022-02-04 PROCEDURE — 87070 CULTURE OTHR SPECIMN AEROBIC: CPT | Performed by: DENTIST

## 2022-02-04 PROCEDURE — 250N000011 HC RX IP 250 OP 636: Performed by: ANESTHESIOLOGY

## 2022-02-04 PROCEDURE — 272N000001 HC OR GENERAL SUPPLY STERILE: Performed by: DENTIST

## 2022-02-04 PROCEDURE — 250N000011 HC RX IP 250 OP 636: Performed by: INTERNAL MEDICINE

## 2022-02-04 PROCEDURE — 88307 TISSUE EXAM BY PATHOLOGIST: CPT | Mod: 26 | Performed by: PATHOLOGY

## 2022-02-04 PROCEDURE — 258N000003 HC RX IP 258 OP 636: Performed by: STUDENT IN AN ORGANIZED HEALTH CARE EDUCATION/TRAINING PROGRAM

## 2022-02-04 PROCEDURE — 87075 CULTR BACTERIA EXCEPT BLOOD: CPT | Performed by: DENTIST

## 2022-02-04 PROCEDURE — 999N000141 HC STATISTIC PRE-PROCEDURE NURSING ASSESSMENT: Performed by: DENTIST

## 2022-02-04 PROCEDURE — 360N000075 HC SURGERY LEVEL 2, PER MIN: Performed by: DENTIST

## 2022-02-04 PROCEDURE — 99223 1ST HOSP IP/OBS HIGH 75: CPT | Mod: GC | Performed by: INTERNAL MEDICINE

## 2022-02-04 PROCEDURE — 250N000009 HC RX 250: Performed by: DENTIST

## 2022-02-04 PROCEDURE — 250N000013 HC RX MED GY IP 250 OP 250 PS 637: Performed by: INTERNAL MEDICINE

## 2022-02-04 PROCEDURE — 710N000010 HC RECOVERY PHASE 1, LEVEL 2, PER MIN: Performed by: DENTIST

## 2022-02-04 PROCEDURE — 88311 DECALCIFY TISSUE: CPT | Mod: TC | Performed by: DENTIST

## 2022-02-04 PROCEDURE — 93005 ELECTROCARDIOGRAM TRACING: CPT | Performed by: EMERGENCY MEDICINE

## 2022-02-04 PROCEDURE — 94640 AIRWAY INHALATION TREATMENT: CPT | Performed by: EMERGENCY MEDICINE

## 2022-02-04 PROCEDURE — 87077 CULTURE AEROBIC IDENTIFY: CPT | Performed by: DENTIST

## 2022-02-04 PROCEDURE — 87102 FUNGUS ISOLATION CULTURE: CPT | Performed by: DENTIST

## 2022-02-04 PROCEDURE — 250N000009 HC RX 250: Performed by: ANESTHESIOLOGY

## 2022-02-04 PROCEDURE — 120N000002 HC R&B MED SURG/OB UMMC

## 2022-02-04 PROCEDURE — 87205 SMEAR GRAM STAIN: CPT | Performed by: DENTIST

## 2022-02-04 PROCEDURE — 250N000013 HC RX MED GY IP 250 OP 250 PS 637: Performed by: ANESTHESIOLOGY

## 2022-02-04 PROCEDURE — 99285 EMERGENCY DEPT VISIT HI MDM: CPT | Performed by: EMERGENCY MEDICINE

## 2022-02-04 PROCEDURE — 250N000013 HC RX MED GY IP 250 OP 250 PS 637: Performed by: EMERGENCY MEDICINE

## 2022-02-04 PROCEDURE — 370N000017 HC ANESTHESIA TECHNICAL FEE, PER MIN: Performed by: DENTIST

## 2022-02-04 PROCEDURE — 0NBV0ZZ EXCISION OF LEFT MANDIBLE, OPEN APPROACH: ICD-10-PCS | Performed by: DENTIST

## 2022-02-04 RX ORDER — ONDANSETRON 4 MG/1
4 TABLET, ORALLY DISINTEGRATING ORAL EVERY 30 MIN PRN
Status: DISCONTINUED | OUTPATIENT
Start: 2022-02-04 | End: 2022-02-04 | Stop reason: HOSPADM

## 2022-02-04 RX ORDER — LIDOCAINE 40 MG/G
CREAM TOPICAL
Status: DISCONTINUED | OUTPATIENT
Start: 2022-02-04 | End: 2022-02-07 | Stop reason: HOSPADM

## 2022-02-04 RX ORDER — OXYCODONE HYDROCHLORIDE 5 MG/1
5 TABLET ORAL EVERY 4 HOURS PRN
Status: DISCONTINUED | OUTPATIENT
Start: 2022-02-04 | End: 2022-02-04 | Stop reason: HOSPADM

## 2022-02-04 RX ORDER — ATORVASTATIN CALCIUM 20 MG/1
20 TABLET, FILM COATED ORAL DAILY
Status: DISCONTINUED | OUTPATIENT
Start: 2022-02-04 | End: 2022-02-07 | Stop reason: HOSPADM

## 2022-02-04 RX ORDER — CLINDAMYCIN PHOSPHATE 900 MG/50ML
900 INJECTION, SOLUTION INTRAVENOUS EVERY 8 HOURS
Status: DISCONTINUED | OUTPATIENT
Start: 2022-02-04 | End: 2022-02-05

## 2022-02-04 RX ORDER — ONDANSETRON 2 MG/ML
INJECTION INTRAMUSCULAR; INTRAVENOUS PRN
Status: DISCONTINUED | OUTPATIENT
Start: 2022-02-04 | End: 2022-02-04

## 2022-02-04 RX ORDER — AMPICILLIN AND SULBACTAM 2; 1 G/1; G/1
3 INJECTION, POWDER, FOR SOLUTION INTRAMUSCULAR; INTRAVENOUS ONCE
Status: COMPLETED | OUTPATIENT
Start: 2022-02-04 | End: 2022-02-04

## 2022-02-04 RX ORDER — SODIUM CHLORIDE 9 MG/ML
INJECTION, SOLUTION INTRAVENOUS CONTINUOUS
Status: ACTIVE | OUTPATIENT
Start: 2022-02-04 | End: 2022-02-04

## 2022-02-04 RX ORDER — ONDANSETRON 2 MG/ML
4 INJECTION INTRAMUSCULAR; INTRAVENOUS EVERY 30 MIN PRN
Status: DISCONTINUED | OUTPATIENT
Start: 2022-02-04 | End: 2022-02-04 | Stop reason: HOSPADM

## 2022-02-04 RX ORDER — ONDANSETRON 4 MG/1
4 TABLET, ORALLY DISINTEGRATING ORAL EVERY 6 HOURS PRN
Status: DISCONTINUED | OUTPATIENT
Start: 2022-02-04 | End: 2022-02-07 | Stop reason: HOSPADM

## 2022-02-04 RX ORDER — ALBUTEROL SULFATE 90 UG/1
AEROSOL, METERED RESPIRATORY (INHALATION)
Status: ON HOLD | COMMUNITY
Start: 2022-01-31 | End: 2022-02-07

## 2022-02-04 RX ORDER — HYDRALAZINE HYDROCHLORIDE 20 MG/ML
10 INJECTION INTRAMUSCULAR; INTRAVENOUS
Status: DISCONTINUED | OUTPATIENT
Start: 2022-02-04 | End: 2022-02-04 | Stop reason: HOSPADM

## 2022-02-04 RX ORDER — FLUCONAZOLE 100 MG/1
100 TABLET ORAL DAILY
Status: CANCELLED | OUTPATIENT
Start: 2022-02-04

## 2022-02-04 RX ORDER — PROPOFOL 10 MG/ML
INJECTION, EMULSION INTRAVENOUS PRN
Status: DISCONTINUED | OUTPATIENT
Start: 2022-02-04 | End: 2022-02-04

## 2022-02-04 RX ORDER — LIDOCAINE HYDROCHLORIDE 20 MG/ML
INJECTION, SOLUTION INFILTRATION; PERINEURAL PRN
Status: DISCONTINUED | OUTPATIENT
Start: 2022-02-04 | End: 2022-02-04

## 2022-02-04 RX ORDER — ACETAMINOPHEN 325 MG/1
975 TABLET ORAL ONCE
Status: COMPLETED | OUTPATIENT
Start: 2022-02-04 | End: 2022-02-04

## 2022-02-04 RX ORDER — DEXAMETHASONE SODIUM PHOSPHATE 4 MG/ML
INJECTION, SOLUTION INTRA-ARTICULAR; INTRALESIONAL; INTRAMUSCULAR; INTRAVENOUS; SOFT TISSUE PRN
Status: DISCONTINUED | OUTPATIENT
Start: 2022-02-04 | End: 2022-02-04

## 2022-02-04 RX ORDER — HYDROMORPHONE HCL IN WATER/PF 6 MG/30 ML
0.2 PATIENT CONTROLLED ANALGESIA SYRINGE INTRAVENOUS EVERY 5 MIN PRN
Status: DISCONTINUED | OUTPATIENT
Start: 2022-02-04 | End: 2022-02-04 | Stop reason: HOSPADM

## 2022-02-04 RX ORDER — NALOXONE HYDROCHLORIDE 0.4 MG/ML
0.4 INJECTION, SOLUTION INTRAMUSCULAR; INTRAVENOUS; SUBCUTANEOUS
Status: DISCONTINUED | OUTPATIENT
Start: 2022-02-04 | End: 2022-02-07 | Stop reason: HOSPADM

## 2022-02-04 RX ORDER — NICOTINE 21 MG/24HR
1 PATCH, TRANSDERMAL 24 HOURS TRANSDERMAL DAILY
Status: DISCONTINUED | OUTPATIENT
Start: 2022-02-04 | End: 2022-02-04

## 2022-02-04 RX ORDER — ACETAMINOPHEN 500 MG
500-1000 TABLET ORAL EVERY 6 HOURS PRN
Status: DISCONTINUED | OUTPATIENT
Start: 2022-02-04 | End: 2022-02-04

## 2022-02-04 RX ORDER — NICOTINE 21 MG/24HR
1 PATCH, TRANSDERMAL 24 HOURS TRANSDERMAL DAILY
Status: DISCONTINUED | OUTPATIENT
Start: 2022-02-04 | End: 2022-02-07 | Stop reason: HOSPADM

## 2022-02-04 RX ORDER — DEXAMETHASONE 4 MG/1
2 TABLET ORAL 2 TIMES DAILY
COMMUNITY
Start: 2022-01-04 | End: 2022-05-14

## 2022-02-04 RX ORDER — IPRATROPIUM BROMIDE AND ALBUTEROL SULFATE 2.5; .5 MG/3ML; MG/3ML
3 SOLUTION RESPIRATORY (INHALATION) ONCE
Status: COMPLETED | OUTPATIENT
Start: 2022-02-04 | End: 2022-02-04

## 2022-02-04 RX ORDER — DEXAMETHASONE SODIUM PHOSPHATE 10 MG/ML
10 INJECTION, SOLUTION INTRAMUSCULAR; INTRAVENOUS ONCE
Status: COMPLETED | OUTPATIENT
Start: 2022-02-04 | End: 2022-02-04

## 2022-02-04 RX ORDER — PAROXETINE 10 MG/1
10 TABLET, FILM COATED ORAL DAILY
Status: DISCONTINUED | OUTPATIENT
Start: 2022-02-04 | End: 2022-02-07 | Stop reason: HOSPADM

## 2022-02-04 RX ORDER — HYDRALAZINE HYDROCHLORIDE 20 MG/ML
5 INJECTION INTRAMUSCULAR; INTRAVENOUS ONCE
Status: COMPLETED | OUTPATIENT
Start: 2022-02-04 | End: 2022-02-04

## 2022-02-04 RX ORDER — METHYLPREDNISOLONE SODIUM SUCCINATE 40 MG/ML
40 INJECTION, POWDER, LYOPHILIZED, FOR SOLUTION INTRAMUSCULAR; INTRAVENOUS EVERY 12 HOURS
Status: DISCONTINUED | OUTPATIENT
Start: 2022-02-04 | End: 2022-02-05

## 2022-02-04 RX ORDER — SODIUM CHLORIDE, SODIUM LACTATE, POTASSIUM CHLORIDE, CALCIUM CHLORIDE 600; 310; 30; 20 MG/100ML; MG/100ML; MG/100ML; MG/100ML
INJECTION, SOLUTION INTRAVENOUS CONTINUOUS
Status: DISCONTINUED | OUTPATIENT
Start: 2022-02-04 | End: 2022-02-04 | Stop reason: HOSPADM

## 2022-02-04 RX ORDER — FENTANYL CITRATE 50 UG/ML
25 INJECTION, SOLUTION INTRAMUSCULAR; INTRAVENOUS EVERY 5 MIN PRN
Status: DISCONTINUED | OUTPATIENT
Start: 2022-02-04 | End: 2022-02-04 | Stop reason: HOSPADM

## 2022-02-04 RX ORDER — ACETAMINOPHEN 325 MG/1
325 TABLET ORAL EVERY 4 HOURS PRN
Status: DISCONTINUED | OUTPATIENT
Start: 2022-02-04 | End: 2022-02-07 | Stop reason: HOSPADM

## 2022-02-04 RX ORDER — HYDROMORPHONE HCL IN WATER/PF 6 MG/30 ML
0.5 PATIENT CONTROLLED ANALGESIA SYRINGE INTRAVENOUS
Status: DISCONTINUED | OUTPATIENT
Start: 2022-02-04 | End: 2022-02-04

## 2022-02-04 RX ORDER — CHLORHEXIDINE GLUCONATE ORAL RINSE 1.2 MG/ML
10 SOLUTION DENTAL ONCE
Status: COMPLETED | OUTPATIENT
Start: 2022-02-04 | End: 2022-02-04

## 2022-02-04 RX ORDER — FENTANYL CITRATE 50 UG/ML
INJECTION, SOLUTION INTRAMUSCULAR; INTRAVENOUS PRN
Status: DISCONTINUED | OUTPATIENT
Start: 2022-02-04 | End: 2022-02-04

## 2022-02-04 RX ORDER — ATENOLOL 25 MG/1
25 TABLET ORAL DAILY
Status: DISCONTINUED | OUTPATIENT
Start: 2022-02-04 | End: 2022-02-07 | Stop reason: HOSPADM

## 2022-02-04 RX ORDER — NALOXONE HYDROCHLORIDE 0.4 MG/ML
0.2 INJECTION, SOLUTION INTRAMUSCULAR; INTRAVENOUS; SUBCUTANEOUS
Status: DISCONTINUED | OUTPATIENT
Start: 2022-02-04 | End: 2022-02-07 | Stop reason: HOSPADM

## 2022-02-04 RX ORDER — ESMOLOL HYDROCHLORIDE 10 MG/ML
INJECTION INTRAVENOUS PRN
Status: DISCONTINUED | OUTPATIENT
Start: 2022-02-04 | End: 2022-02-04

## 2022-02-04 RX ORDER — BUPIVACAINE HYDROCHLORIDE AND EPINEPHRINE 2.5; 5 MG/ML; UG/ML
INJECTION, SOLUTION INFILTRATION; PERINEURAL PRN
Status: DISCONTINUED | OUTPATIENT
Start: 2022-02-04 | End: 2022-02-04 | Stop reason: HOSPADM

## 2022-02-04 RX ORDER — ACETAMINOPHEN 500 MG
1000 TABLET ORAL ONCE
Status: COMPLETED | OUTPATIENT
Start: 2022-02-04 | End: 2022-02-04

## 2022-02-04 RX ORDER — SODIUM CHLORIDE 9 MG/ML
INJECTION, SOLUTION INTRAVENOUS CONTINUOUS PRN
Status: DISCONTINUED | OUTPATIENT
Start: 2022-02-04 | End: 2022-02-04

## 2022-02-04 RX ORDER — LEVOTHYROXINE SODIUM 112 UG/1
112 TABLET ORAL DAILY
Status: DISCONTINUED | OUTPATIENT
Start: 2022-02-04 | End: 2022-02-07 | Stop reason: HOSPADM

## 2022-02-04 RX ORDER — DIPHENHYDRAMINE HYDROCHLORIDE 50 MG/ML
25 INJECTION INTRAMUSCULAR; INTRAVENOUS ONCE
Status: COMPLETED | OUTPATIENT
Start: 2022-02-04 | End: 2022-02-04

## 2022-02-04 RX ORDER — ALBUTEROL SULFATE 5 MG/ML
2.5 SOLUTION RESPIRATORY (INHALATION) EVERY 4 HOURS PRN
Status: DISCONTINUED | OUTPATIENT
Start: 2022-02-04 | End: 2022-02-07 | Stop reason: HOSPADM

## 2022-02-04 RX ORDER — KETOROLAC TROMETHAMINE 30 MG/ML
15 INJECTION, SOLUTION INTRAMUSCULAR; INTRAVENOUS ONCE
Status: COMPLETED | OUTPATIENT
Start: 2022-02-04 | End: 2022-02-04

## 2022-02-04 RX ORDER — PROCHLORPERAZINE MALEATE 10 MG
10 TABLET ORAL EVERY 6 HOURS PRN
Status: DISCONTINUED | OUTPATIENT
Start: 2022-02-04 | End: 2022-02-07 | Stop reason: HOSPADM

## 2022-02-04 RX ORDER — PROCHLORPERAZINE 25 MG
25 SUPPOSITORY, RECTAL RECTAL EVERY 12 HOURS PRN
Status: DISCONTINUED | OUTPATIENT
Start: 2022-02-04 | End: 2022-02-07 | Stop reason: HOSPADM

## 2022-02-04 RX ORDER — ONDANSETRON 2 MG/ML
4 INJECTION INTRAMUSCULAR; INTRAVENOUS EVERY 6 HOURS PRN
Status: DISCONTINUED | OUTPATIENT
Start: 2022-02-04 | End: 2022-02-07 | Stop reason: HOSPADM

## 2022-02-04 RX ADMIN — CHLORHEXIDINE GLUCONATE 0.12% ORAL RINSE 10 ML: 1.2 LIQUID ORAL at 09:37

## 2022-02-04 RX ADMIN — ROCURONIUM BROMIDE 30 MG: 50 INJECTION, SOLUTION INTRAVENOUS at 10:21

## 2022-02-04 RX ADMIN — PHENYLEPHRINE HYDROCHLORIDE 200 MCG: 10 INJECTION INTRAVENOUS at 10:20

## 2022-02-04 RX ADMIN — FENTANYL CITRATE 100 MCG: 50 INJECTION, SOLUTION INTRAMUSCULAR; INTRAVENOUS at 10:14

## 2022-02-04 RX ADMIN — ACETAMINOPHEN 1000 MG: 500 TABLET ORAL at 03:36

## 2022-02-04 RX ADMIN — AMPICILLIN SODIUM AND SULBACTAM SODIUM 3 G: 2; 1 INJECTION, POWDER, FOR SOLUTION INTRAMUSCULAR; INTRAVENOUS at 10:22

## 2022-02-04 RX ADMIN — FENTANYL CITRATE 25 MCG: 50 INJECTION, SOLUTION INTRAMUSCULAR; INTRAVENOUS at 11:42

## 2022-02-04 RX ADMIN — ONDANSETRON 4 MG: 2 INJECTION INTRAMUSCULAR; INTRAVENOUS at 10:53

## 2022-02-04 RX ADMIN — HYDROMORPHONE HYDROCHLORIDE 0.2 MG: 0.2 INJECTION, SOLUTION INTRAMUSCULAR; INTRAVENOUS; SUBCUTANEOUS at 11:57

## 2022-02-04 RX ADMIN — FENTANYL CITRATE 50 MCG: 50 INJECTION, SOLUTION INTRAMUSCULAR; INTRAVENOUS at 12:10

## 2022-02-04 RX ADMIN — CLINDAMYCIN IN 5 PERCENT DEXTROSE 900 MG: 18 INJECTION, SOLUTION INTRAVENOUS at 16:35

## 2022-02-04 RX ADMIN — FENTANYL CITRATE 50 MCG: 50 INJECTION, SOLUTION INTRAMUSCULAR; INTRAVENOUS at 11:22

## 2022-02-04 RX ADMIN — MIDAZOLAM 2 MG: 1 INJECTION INTRAMUSCULAR; INTRAVENOUS at 10:08

## 2022-02-04 RX ADMIN — HYDROMORPHONE HYDROCHLORIDE 0.2 MG: 0.2 INJECTION, SOLUTION INTRAMUSCULAR; INTRAVENOUS; SUBCUTANEOUS at 12:30

## 2022-02-04 RX ADMIN — DEXAMETHASONE SODIUM PHOSPHATE 6 MG: 4 INJECTION, SOLUTION INTRA-ARTICULAR; INTRALESIONAL; INTRAMUSCULAR; INTRAVENOUS; SOFT TISSUE at 10:26

## 2022-02-04 RX ADMIN — LEVOTHYROXINE SODIUM 112 MCG: 0.11 TABLET ORAL at 16:35

## 2022-02-04 RX ADMIN — FENTANYL CITRATE 25 MCG: 50 INJECTION, SOLUTION INTRAMUSCULAR; INTRAVENOUS at 11:32

## 2022-02-04 RX ADMIN — DIPHENHYDRAMINE HYDROCHLORIDE 25 MG: 50 INJECTION, SOLUTION INTRAMUSCULAR; INTRAVENOUS at 12:25

## 2022-02-04 RX ADMIN — HYDROMORPHONE HYDROCHLORIDE 0.2 MG: 0.2 INJECTION, SOLUTION INTRAMUSCULAR; INTRAVENOUS; SUBCUTANEOUS at 11:52

## 2022-02-04 RX ADMIN — IPRATROPIUM BROMIDE AND ALBUTEROL SULFATE 3 ML: .5; 3 SOLUTION RESPIRATORY (INHALATION) at 03:36

## 2022-02-04 RX ADMIN — FENTANYL CITRATE 50 MCG: 50 INJECTION, SOLUTION INTRAMUSCULAR; INTRAVENOUS at 10:33

## 2022-02-04 RX ADMIN — ATORVASTATIN CALCIUM 20 MG: 20 TABLET, FILM COATED ORAL at 20:05

## 2022-02-04 RX ADMIN — HYDRALAZINE HYDROCHLORIDE 5 MG: 20 INJECTION INTRAMUSCULAR; INTRAVENOUS at 12:12

## 2022-02-04 RX ADMIN — SODIUM CHLORIDE: 9 INJECTION, SOLUTION INTRAVENOUS at 09:50

## 2022-02-04 RX ADMIN — HYDRALAZINE HYDROCHLORIDE 10 MG: 20 INJECTION INTRAMUSCULAR; INTRAVENOUS at 13:18

## 2022-02-04 RX ADMIN — DEXAMETHASONE SODIUM PHOSPHATE 10 MG: 10 INJECTION, SOLUTION INTRAMUSCULAR; INTRAVENOUS at 05:33

## 2022-02-04 RX ADMIN — KETOROLAC TROMETHAMINE 15 MG: 30 INJECTION, SOLUTION INTRAMUSCULAR; INTRAVENOUS at 13:18

## 2022-02-04 RX ADMIN — FENTANYL CITRATE 25 MCG: 50 INJECTION, SOLUTION INTRAMUSCULAR; INTRAVENOUS at 11:47

## 2022-02-04 RX ADMIN — SODIUM CHLORIDE: 9 INJECTION, SOLUTION INTRAVENOUS at 11:53

## 2022-02-04 RX ADMIN — SODIUM CHLORIDE: 9 INJECTION, SOLUTION INTRAVENOUS at 05:33

## 2022-02-04 RX ADMIN — ACETAMINOPHEN 325 MG: 325 TABLET, FILM COATED ORAL at 16:54

## 2022-02-04 RX ADMIN — FENTANYL CITRATE 50 MCG: 50 INJECTION, SOLUTION INTRAMUSCULAR; INTRAVENOUS at 11:10

## 2022-02-04 RX ADMIN — LIDOCAINE HYDROCHLORIDE 100 MG: 20 INJECTION, SOLUTION INFILTRATION; PERINEURAL at 10:14

## 2022-02-04 RX ADMIN — HYDROMORPHONE HYDROCHLORIDE 0.2 MG: 0.2 INJECTION, SOLUTION INTRAMUSCULAR; INTRAVENOUS; SUBCUTANEOUS at 12:00

## 2022-02-04 RX ADMIN — PROPOFOL 50 MG: 10 INJECTION, EMULSION INTRAVENOUS at 10:37

## 2022-02-04 RX ADMIN — CLINDAMYCIN IN 5 PERCENT DEXTROSE 900 MG: 18 INJECTION, SOLUTION INTRAVENOUS at 23:07

## 2022-02-04 RX ADMIN — PAROXETINE HYDROCHLORIDE 10 MG: 10 TABLET, FILM COATED ORAL at 16:34

## 2022-02-04 RX ADMIN — NICOTINE 1 PATCH: 21 PATCH, EXTENDED RELEASE TRANSDERMAL at 03:56

## 2022-02-04 RX ADMIN — ATENOLOL 25 MG: 25 TABLET ORAL at 16:35

## 2022-02-04 RX ADMIN — DIPHENHYDRAMINE HYDROCHLORIDE 25 MG: 50 INJECTION, SOLUTION INTRAMUSCULAR; INTRAVENOUS at 11:40

## 2022-02-04 RX ADMIN — METHYLPREDNISOLONE SODIUM SUCCINATE 40 MG: 40 INJECTION, POWDER, FOR SOLUTION INTRAMUSCULAR; INTRAVENOUS at 20:04

## 2022-02-04 RX ADMIN — Medication 80 MG: at 10:14

## 2022-02-04 RX ADMIN — ESMOLOL HYDROCHLORIDE 30 MG: 10 INJECTION, SOLUTION INTRAVENOUS at 10:40

## 2022-02-04 RX ADMIN — PROPOFOL 150 MG: 10 INJECTION, EMULSION INTRAVENOUS at 10:14

## 2022-02-04 RX ADMIN — SUGAMMADEX 200 MG: 100 INJECTION, SOLUTION INTRAVENOUS at 11:04

## 2022-02-04 RX ADMIN — FENTANYL CITRATE 25 MCG: 50 INJECTION, SOLUTION INTRAMUSCULAR; INTRAVENOUS at 11:37

## 2022-02-04 RX ADMIN — ACETAMINOPHEN 325 MG: 325 TABLET, FILM COATED ORAL at 21:25

## 2022-02-04 RX ADMIN — HYDROMORPHONE HYDROCHLORIDE 0.2 MG: 0.2 INJECTION, SOLUTION INTRAMUSCULAR; INTRAVENOUS; SUBCUTANEOUS at 12:14

## 2022-02-04 RX ADMIN — ACETAMINOPHEN 975 MG: 325 TABLET, FILM COATED ORAL at 12:43

## 2022-02-04 RX ADMIN — Medication 10 MG: at 12:43

## 2022-02-04 ASSESSMENT — ACTIVITIES OF DAILY LIVING (ADL)
ADLS_ACUITY_SCORE: 7
ADLS_ACUITY_SCORE: 6
ADLS_ACUITY_SCORE: 7
ADLS_ACUITY_SCORE: 8
ADLS_ACUITY_SCORE: 6
ADLS_ACUITY_SCORE: 7
ADLS_ACUITY_SCORE: 7
ADLS_ACUITY_SCORE: 6
ADLS_ACUITY_SCORE: 7
ADLS_ACUITY_SCORE: 8
ADLS_ACUITY_SCORE: 8
ADLS_ACUITY_SCORE: 7
ADLS_ACUITY_SCORE: 7
ADLS_ACUITY_SCORE: 6
ADLS_ACUITY_SCORE: 8
ADLS_ACUITY_SCORE: 6
ADLS_ACUITY_SCORE: 7

## 2022-02-04 ASSESSMENT — MIFFLIN-ST. JEOR: SCORE: 1170.56

## 2022-02-04 NOTE — H&P
[facility]    History and Physical - Medicine Service, JOSE [facility] TEAM        Date of Admission:  2/4/2022    Assessment & Plan      Lara Melendez is a 56 year old female admitted on 2/4/2022. She has a history of history of tobacco use, COPD, thyroid disease, hypertension, and recent left aubrey-mandibular abscess s/p cutaneous I&D x 2 (first on 1/10/202) and is admitted for trismus and shortness of breath, likely COPD exacerbation as well as ongoing swelling from abscess, plan for return to OR w/ OMFS 2/4.    Submandibular abscess   Prior culture growth from I&D 1/10 w/ Fusobacterium, Strep intermedius, anaerobic diphtheroids, Prevotella; completed course of clindamycin x 10 days after I&D. Also w/ Candida growth on 1/4 from mandible swab so completed course of fluconazole. CT yesterday w/ new left-sided abscess site.  - Admit to medicine  - OMFS consulted from ED; plan for OR this morning  - Continue IV clindamycin  - Decadron 10 mg given once in ER; continue pending OMFS recs  - Follow up blood culture (x 2 ) from 2/3   - Tylenol PRN; pain well controlled currently, can add oxycodone or dilaudid if needed    ?COPD exacerbation with wheezing, ?hypoxia  No history of COPD diagnosis, however seems likely given tobacco use history as well as exam w/ diffuse expiratory wheezing even after steroids, Duoneb. Would benefit from outpatient evaluation for COPD, initiating long-acting therapy like salmeterol or tiotropium  - S/p methylpred in outside ED, decadron in [facility] ED; consider course of prednisone x 5 days if no further steroids needed for abscess  - Consider azithromycin course pending antibiotic plan for abscess  - Albuterol Q4H PRN wheezing; consider discharging w/ short-acting bronchodilator   - Titrate O2 to maintain sat >90% (on 2L currently)    Hypovolemic hyponatremia  Similar to previous admission; improved then w/ IV fluids.  - NS @ 125 ml/hr while NPO (ordered 8  hours)  - Repeat BMP tomorrow    Chronic Conditions  - Hypertension:  PTA atenolol 50mg daily  - HLD: Hold PTA atorvastatin 20mg daily  - Hypothyroidism: PTA synthroid 112mcg daily   - Anxiety: PTA paxil 10mg daily  - Nicotine use disorder: Smokes 1PPD. Ordered NRT.     Diet:  NPO for OR  DVT Prophylaxis: Pneumatic Compression Devices (no chemical prophylaxis before OR)  Hines Catheter: Not present  Fluids: None  Central Lines: None  Cardiac Monitoring: None  Code Status:   Full code    Disposition Plan   Expected Discharge: >2 midnights   Anticipated discharge location:  Awaiting care coordination huddle     Patient will be formally staffed in the morning.    Elena Isbell MD  Medicine Service, [facility]  Securely message with the Vocera Web Console (learn more here)  Text page via [facility] Paging/Directory   Please see signed in provider for up to date coverage information    ______________________________________________________________________    Chief Complaint   Shortness of breath    History is obtained from the patient.    History of Present Illness   Lara Melendez is a 56 year old female who presents with shortness of breath, malaise.    Recent history of submandibular abscess, drained twice. Completed a course of antibiotics (clindamycin; also fluconazole) once she was discharged from the hospital. Symptoms had been improving. In the 24 hours prior to presentation she developed significant shortness of breath, notes she couldn't walk even a few steps at home before feeling completely winded. She has some difficulty opening her mouth completely after this had initially gotten better. Minimal PO intake in last day or two due to the pain and trismus. No fevers or chills at home.    At outside ER, she was noted to have expiratory wheezing, improved w/ bronchodilators. Respiratory status otherwise stable - no stridor, no drooling, speaking in fulls  "sentences. CXR was without infiltrate; flu and COVID testing negative. CT neck showed recurrent abscess in new location, still on left side. She was given methylprednisolone and clindamycin and transferred to [facility] for further management.    Since arrival here she notes she has been feeling better - thinks the oxygen is helping her feel comfortable. She has no pain currently and her breathing is comfortable. She denies any history of COPD diagnosis though notes \"I probably have it from smoking.\" She has never been prescribed inhaled or nebulized meds.    Review of Systems    The 10 point Review of Systems is negative other than noted in the HPI or here.     Past Medical History    I have reviewed this patient's medical history and updated it with pertinent information if needed.   Past Medical History:   Diagnosis Date     Anxiety      Elevated cholesterol      Hypertension      Thyroid disease       Past Surgical History   I have reviewed this patient's surgical history and updated it with pertinent information if needed.  Past Surgical History:   Procedure Laterality Date     CHOLECYSTECTOMY       ENT SURGERY      tooth extraction     INCISION AND DRAINAGE MANDIBLE, COMBINED Left 1/10/2022    Procedure: INCISION AND DRAINAGE, MANDIBLE;  Surgeon: Jn Feliz DDS;  Location: [facility] OR      Social History   I have reviewed this patient's social history and updated it with pertinent information if needed. Lara Melendez  reports that she has been smoking. She has been smoking about 1.00 pack per day. She has never used smokeless tobacco. She reports current alcohol use. She reports that she does not use drugs.    Family History   Daughters have factor V leiden deficiency.     Prior to Admission Medications   Prior to Admission Medications   Prescriptions Last Dose Informant Patient Reported? Taking?   PARoxetine (PAXIL) 10 MG tablet  Self Yes No   Sig: Take 1 tablet by mouth daily   acetaminophen (TYLENOL) 500 MG " tablet  Self Yes No   Sig: Take 500-1,000 mg by mouth every 6 hours as needed for mild pain   atenolol (TENORMIN) 50 MG tablet  Self Yes No   Sig: Take 50 mg by mouth daily   atorvastatin (LIPITOR) 20 MG tablet  Self Yes No   Sig: Take 20 mg by mouth daily   fluconazole (DIFLUCAN) 100 MG tablet   Yes No   Sig: Take 1 tablet (100 mg) by mouth daily   ibuprofen (ADVIL/MOTRIN) 200 MG tablet  Self Yes No   Sig: Take 200 mg by mouth every 4 hours as needed for mild pain   levothyroxine (SYNTHROID/LEVOTHROID) 112 MCG tablet  Self Yes No   Sig: Take 112 mcg by mouth daily   oxyCODONE (ROXICODONE-INTENSOL) 20 mg/mL CONC (HIGH CONC) solution  Self Yes No   Sig: Take 5 mg by mouth every 6 hours as needed for moderate to severe pain   Patient not taking: Reported on 1/10/2022      Facility-Administered Medications: None     Allergies   Allergies   Allergen Reactions     Sulfa Drugs      Penicillins Rash     Generalized rash  Rash, Generalized         Physical Exam   Vital Signs: Temp: 98.4  F (36.9  C) Temp src: Oral BP: 118/74 Pulse: 85   Resp: 16 SpO2: 93 % O2 Device: Nasal cannula Oxygen Delivery: 2 LPM  Weight: 130 lbs 0 oz    General: Well appearing, laying in bed with head propped up; appears comfortable, speaks in full sentences  HEENT: Atraumatic, normocephalic; nares clear w/ nasal cannula in place, mucous membranes dry; + trismus with tenderness and induration at left submandibular space, small amount of purulent drainage from prior I&D site covered in gauze   Neck: No cervical adenopathy  Resp: Breathing comfortably; diffuse expiratory wheezes present bilaterally; no other focal or abnormal lung sounds  CV: Regular rhythm, normal rate; no murmurs appreciated  Abdomen: Soft, nondistended, nontender; bowel sounds present  Extremities: Warm and well perfused; no lower extremity edema  Skin: No lesions on exposed skin  Neuro: Alert, oriented x 3; cranial nerves grossly intact     Data   Data reviewed today: I reviewed  all medications, new labs and imaging results over the last 24 hours. I personally reviewed no images or EKG's today.    Recent Labs   Lab 02/03/22 2050   WBC 6.6   HGB 12.4   *      *   POTASSIUM 3.4   CHLORIDE 100   CO2 26   BUN 7   CR 0.50*   ANIONGAP 5   EDIN 9.0   *

## 2022-02-04 NOTE — OR NURSING
Surgery Notified:  Lara RAMOS: no pre-op orders. Please put in if there is anything you need us to do.  thank you!  [phone]    Surgery team ordered Peridex oral solution. No abx.

## 2022-02-04 NOTE — ANESTHESIA PREPROCEDURE EVALUATION
Anesthesia Pre-Procedure Evaluation    Patient: Lara Melendez   MRN: [id number] : 1965        Preoperative Diagnosis: Mandibular abscess [M27.2]    Procedure : Procedure(s):  INCISION AND DRAINAGE, MANDIBLE          Past Medical History:   Diagnosis Date     Anxiety      Elevated cholesterol      Hypertension      Thyroid disease       Past Surgical History:   Procedure Laterality Date     CHOLECYSTECTOMY       ENT SURGERY      tooth extraction     INCISION AND DRAINAGE MANDIBLE, COMBINED Left 1/10/2022    Procedure: INCISION AND DRAINAGE, MANDIBLE;  Surgeon: Jn Feliz DDS;  Location: [facility] OR      Allergies   Allergen Reactions     Sulfa Drugs      Penicillins Rash     Generalized rash  Rash, Generalized        Social History     Tobacco Use     Smoking status: Current Every Day Smoker     Packs/day: 1.00     Smokeless tobacco: Never Used   Substance Use Topics     Alcohol use: Yes     Comment: occ      Wt Readings from Last 1 Encounters:   22 59 kg (130 lb)              OUTSIDE LABS:  CBC:   Lab Results   Component Value Date    WBC 6.6 2022    WBC 9.4 2022    HGB 12.4 2022    HGB 12.5 2022    HCT 37.1 2022    HCT 37.4 2022     2022     2022     BMP:   Lab Results   Component Value Date     (L) 2022     2022    POTASSIUM 3.4 2022    POTASSIUM 4.1 2022    CHLORIDE 100 2022    CHLORIDE 103 2022    CO2 26 2022    CO2 24 2022    BUN 7 2022    BUN 3 (L) 2022    CR 0.50 (L) 2022    CR 0.45 (L) 2022     (H) 2022     (H) 2022     COAGS: No results found for: PTT, INR, FIBR  POC:   Lab Results   Component Value Date     (H) 2006    HCG Negative 2006     HEPATIC:   Lab Results   Component Value Date    ALBUMIN 2.4 (L) 2022    PROTTOTAL 6.7 (L) 2022    ALT 17 2022    AST 16 2022    ALKPHOS 86  01/11/2022    BILITOTAL 0.3 01/11/2022     OTHER:   Lab Results   Component Value Date    LACT 1.2 01/10/2022    EDIN 9.0 02/03/2022    PHOS 3.4 01/11/2022    MAG 2.0 01/11/2022    TSH 4.68 01/29/2006    .0 (H) 01/10/2022       Anesthesia Plan    ASA Status:  2      Anesthesia Type: General.     - Airway: ETT   Induction: Intravenous.   Maintenance: Balanced.        Consents            Postoperative Care    Pain management: IV analgesics.   PONV prophylaxis: Ondansetron (or other 5HT-3), Dexamethasone or Solumedrol     Comments:                Anabelle Snider MD

## 2022-02-04 NOTE — BRIEF OP NOTE
[facility]    Brief Operative Note    Pre-operative diagnosis: Mandibular abscess [M27.2]  Post-operative diagnosis Same as pre-operative diagnosis    Procedure: Procedure(s):  INCISION AND DRAINAGE, MANDIBLE  Surgeon: Surgeon(s) and Role:     * Taylor Ortez DDS - Primary     * Lyn Cordero DDS - Resident - Assisting  Anesthesia: General   Estimated Blood Loss: 25cc    Drains: Penrose drains x2  Specimens:   ID Type Source Tests Collected by Time Destination   1 : LEFT mandible Tissue Mandible SURGICAL PATHOLOGY EXAM Taylor Ortez DDS 2/4/2022 10:48 AM    A : LEFT Mandible Abscess Mandible ANAEROBIC BACTERIAL CULTURE ROUTINE, GRAM STAIN, FUNGAL OR YEAST CULTURE ROUTINE, AEROBIC BACTERIAL CULTURE ROUTINE Taylor Ortez DDS 2/4/2022 10:31 AM    B : additional LEFT mandible Abscess Mandible ANAEROBIC BACTERIAL CULTURE ROUTINE, GRAM STAIN, FUNGAL OR YEAST CULTURE ROUTINE, AEROBIC BACTERIAL CULTURE ROUTINE Taylor Ortez DDS 2/4/2022 10:34 AM      Findings:   None. Procedure as stated, incision and drainage of left mandibular abscess with debridement of let mandible. Specimens sent as above.   Complications: None.  Implants: * No implants in log *    Oral Surgery Note:     Recommendation/Plans:  - Appreciate Primary Team Cares  - Soft Mechanical Diet  - HOB elevated, ice to face intermittent   - Fluffs with Neuro netting to neck incision with drains. OK for nursing to change fluffs when saturated.  - Unasyn was given in OR with no reaction noted, recommend continuing until cultures or per ID recommendations  - Decadron 8 mg q8 hrs for 24 hrs from admission   - No smoking  - Chlorhexidine rinses BID; Oral Cares and salt water rinses s/p eating.  - Up with assist   - Pain management per primary team, no surgical contraindication to NSAIDs  - OMS will follow while inpatient  Please page Oral Surgery Resident on call with questions    Lyn Cordero   DDS  OMFS resident

## 2022-02-04 NOTE — CONSULTS
ORAL & MAXILLOFACIAL SURGERY   CONSULT  Lara Melendez,  MRN: [id number],  : 1965        ASSESSMENT   56 year old female s/p I&D of submandibular abscess in the operating room on 1/10/2022 and second drainage placement at the 56 year old female s/p I&D of submandibular abscess in the operating room on 1/10/2022. The patient is having difficulties breathing possibly due to the combination of her COPD and restricted airway from unresolved infection.       PLAN  - Admit to medicine  - OR for extraoral incision and drainage  - IV clindamycin   - IV decadron      Please contact the OMFS resident on-call with questions or concerns.    Discussed with chief resident and staff.    Annalise Sellers DMD  [facility] PGY-2 in Oral & Maxillofacial Surgery    ____________________________________________      HPI  56 year old female s/p I&D of submandibular abscess in the operating room on 1/10/2022.  The patient was following up with [facility] oral and maxillofacial surgery.  The patient was improving after the surgery however the patient got reinfected and a drain was placed again at the [facility] oral and maxillofacial surgery clinic on 2022. The patient presented as [facility] yesterday with a chief complaint of difficulty breathing. CT soft neck tissue with contrast revealed a fluid collection located along the medial margin of the medial pterygoid muscle.     HISTORY  Past Medical History:   Past Medical History:   Diagnosis Date     Anxiety      Elevated cholesterol      Hypertension      Thyroid disease      Past Surgical History:   Past Surgical History:   Procedure Laterality Date     CHOLECYSTECTOMY       ENT SURGERY      tooth extraction     INCISION AND DRAINAGE MANDIBLE, COMBINED Left 1/10/2022    Procedure: INCISION AND DRAINAGE, MANDIBLE;  Surgeon: Jn Feliz DDS;  Location: [facility] OR     Medications:   [COMPLETED] albuterol (PROVENTIL HFA/VENTOLIN HFA) inhaler  [COMPLETED]  clindamycin (CLEOCIN) infusion 900 mg  [COMPLETED] dexamethasone PF (DECADRON) injection 10 mg  [COMPLETED] iopamidol (ISOVUE-370) solution 500 mL  [COMPLETED] methylPREDNISolone sodium succinate (solu-MEDROL) injection 125 mg  [COMPLETED] sodium chloride (PF) 0.9% PF flush 100 mL    acetaminophen (TYLENOL) 500 MG tablet, Take 500-1,000 mg by mouth every 6 hours as needed for mild pain  atenolol (TENORMIN) 50 MG tablet, Take 50 mg by mouth daily  atorvastatin (LIPITOR) 20 MG tablet, Take 20 mg by mouth daily  fluconazole (DIFLUCAN) 100 MG tablet, Take 1 tablet (100 mg) by mouth daily  ibuprofen (ADVIL/MOTRIN) 200 MG tablet, Take 200 mg by mouth every 4 hours as needed for mild pain  levothyroxine (SYNTHROID/LEVOTHROID) 112 MCG tablet, Take 112 mcg by mouth daily  oxyCODONE (ROXICODONE-INTENSOL) 20 mg/mL CONC (HIGH CONC) solution, Take 5 mg by mouth every 6 hours as needed for moderate to severe pain (Patient not taking: Reported on 1/10/2022)  PARoxetine (PAXIL) 10 MG tablet, Take 1 tablet by mouth daily         nicotine  1 patch Transdermal Daily     nicotine   Transdermal Q8H     Allergies:   Allergies   Allergen Reactions     Sulfa Drugs      Penicillins Rash     Generalized rash  Rash, Generalized       Social History:   Social History     Socioeconomic History     Marital status:      Spouse name: Not on file     Number of children: Not on file     Years of education: Not on file     Highest education level: Not on file   Occupational History     Not on file   Tobacco Use     Smoking status: Current Every Day Smoker     Packs/day: 1.00     Smokeless tobacco: Never Used   Substance and Sexual Activity     Alcohol use: Yes     Comment: occ     Drug use: Never     Sexual activity: Not on file   Other Topics Concern     Not on file   Social History Narrative     Not on file     Social Determinants of Health     Financial Resource Strain: Not on file   Food Insecurity: Not on file   Transportation Needs: Not  "on file   Physical Activity: Not on file   Stress: Not on file   Social Connections: Not on file   Intimate Partner Violence: Not on file   Housing Stability: Not on file       REVIEW OF SYSTEMS  10 point ROS reviewed and negative aside from listed in HPI    PHYSICAL EXAM  Vital Signs:   Vitals:    02/04/22 0246 02/04/22 0247 02/04/22 0248 02/04/22 0300   BP:   (!) 129/98 (!) 128/109   Pulse:   105 95   Resp:       Temp:       TempSrc:       SpO2: 95%  92% 92%   Weight:  59 kg (130 lb)     Height:  1.651 m (5' 5\")         GEN: WD/WN female, NAD  HEENT: NC/AT, EOMI, PERRL  Minor facial asymmetry observed with left submandibular swelling. Incision on the left neck from the previous I&D has purulent drainage. Little Inferior border of mandible is palpable on the left and right side.     JOSE ARMANDO ~ 15mm, fair oral hygiene, intact adult dentition. Floor of the mouth is non-tender and flat with no erythema/ulcerations/pigmentations/exophytic lesions. The patient expressed difficulties swallowing and pain at the back of her throat. Oral pharynx is non-observable due to trismus. Intraoral drainage not observed.    CV: RRR, no M/G/R, warm and well-perfused  PULM: slight air hunger and wheezing,the pt expressed that she feels out of breath when nasal cannula is removed.   GI: Soft, ND/NT  MSK: GREEN, no peripheral extremity edema  NEURO: AAOx4, CN II-XII intact bilaterally  PSYCH: Appropriate mood and affect            REVIEW OF LABORATORY DATA  Most Recent 3 CBC's:  Recent Labs   Lab Test 02/03/22 2050 01/11/22 0612 01/10/22  0547   WBC 6.6 9.4 16.1*   HGB 12.4 12.5 14.9   * 103* 102*    275 306      Most Recent 3 BMP's:  Recent Labs   Lab Test 02/03/22  2050 01/11/22  0612 01/10/22  1756 01/10/22  0547   * 136  --  127*   POTASSIUM 3.4 4.1  --  3.7   CHLORIDE 100 103  --  99   CO2 26 24  --  20   BUN 7 3*  --  7   CR 0.50* 0.45*  --  0.47*   ANIONGAP 5 9  --  8   EDIN 9.0 8.7  --  9.2   * 139* 94 105* "     Most Recent 2 LFT's:  Recent Labs   Lab Test 01/11/22  0612 01/10/22  0547   AST 16 14   ALT 17 20   ALKPHOS 86 107   BILITOTAL 0.3 0.3     Most Recent INR's and Anticoagulation Dosing History:  Anticoagulation Dose History    There is no flowsheet data to display.       Most Recent 3 Troponin's:No lab results found.  Most Recent Cholesterol Panel:No lab results found.  Most Recent 6 Bacteria Isolates From Any Culture (See EPIC Reports for Culture Details):No lab results found.  Most Recent TSH, T4 and A1c Labs:No lab results found.    IMAGING RESULTS (Include outside hospital results)     Independently reviewed. Fluid collection located along the medial margin of the medial pterygoid muscle. Airway constriction observed at the level of the oral pharynx.

## 2022-02-04 NOTE — ED TRIAGE NOTES
"Pt states intermittent dyspnea and \"feeling hot\" for one day pta, worse tonight. Pt states dyspnea worse with exertion. Pt states recently placed back on clindamyacin and had oral maxillofacial surgery place drain placed on left cheek after having I&D performed by maxillofacial surgery under general anesthesia on 01/10. ABCs intact GCS 15  "

## 2022-02-04 NOTE — CONSULTS
GENERAL ID SERVICE INITIAL CONSULTATION     Patient:  Lara Melendez   Date of birth 1965, Medical record number [id number]  Date of Visit:  02/04/2022  Date of Admission: 2/4/2022  Consult Requester:Tio Kessler MD          Assessment and Recommendations:     Recommendations:   1. Clindamycin 300mg TID x 6 weeks for osteomyelitis coverage   2. Fluconazole 100 mg daily x 6 weeks     Assessment:  55 yo female with history of COPD and recent left mandibular abscess 2/2 extraction tooth #17 s/p cutaneous I&D on 1/10/22 and discharged with 10 day course of clindamycin and fluconazole, now with recurrent left mandibular abscess s/p transcutaneous I&D 2/4/22. Patient received Unasyn 3 g intraop and subsequently developed  body rash without airway compromise 2/2 PCN allergy. She was treated with Benadryl 50 mg IV and rash resolved.     Patient currently afebrile and WBC wnl. CT on 2/3/22 shows development of mild sclerosis that may represent early osteomyelitis. Recurrence of abcess is likely d/t an inadequate duration of clindamycin therapy on previous discharge rather than developed resistance to clindamycin. Patient responded well while on clindamycin but worsened upon competition of her 10 day course. Additionally, it is uncommon for clindamycin resistance in a short course duration. After discussion with patient, she received metronidazole in past and did not tolerate due to side effect of stomach pain. Recommendation is 6 week course of Clindamycin for potentially early osteomyelitic infection as well as fluconazole for potential candida coverage. Continue to follow up with [facility] clinic for abscess and monitor for potential side effect of C. diff colitis d/t extended use of clindamycin. Provided patient with contact information to ID clinic if questions were to arise but follow up with ID is not needed.    Problem List:  1. Left recurrent Submandibular abscess w/ allergy to Penicillin    2. COPD  exacerbation with wheezing   3. Hypovolemic hyponatremia       Thank you for this consult. ID will continue to follow. Don't hesitate to call with questions.     Jassi Galindo DDS    ________________________________________________________________         History of Present Illness:     Lara Melendez is a 56 year old female with pmh of tobacco use, COPD, thyroid disease, hypertension, and recent left mandibular abscess s/p cutaneous I&D on 1/10/202 who was admitted for trismus and dyspnea on 2/3 now s/p I&D with placement of drain on 2/4 with OMFS service. ID service was consulted for recurrent mandibular abscess with concern for clindamycin resistance in patient with history of PCN allergy.     On 12/22/21 she underwent tooth extraction #17 at outside office. Patient reports she was started on azithromycin after extraction and returned for subsequent follow ups and was started on Keflex and and flagyl.     On 1/10/22 she presented to ED w/ odynophagia, left facial pain and swelling in context of recent tooth extraction (#17). CT soft tissue neck w/con showed left mandibular abscess without significant narrowing of airway and negative for lymphadenopathy. Underwent I&D via extraoral approach with placement of drain and started on clindamycin 300mg TID x 10 days as well as fluconazole 100 mg daily for candida from mandible swab.     On 2/3/22, she presented to ED with trismus and dyspnea. She reports completing course of fluconazole and clindamycin after last visit and symptoms improved until 24 hours prior to ED presentation she developed SOB. She denies fever and chills at home. CXR at [facility] shows no infiltrate. COVID and Influenza negative. CT soft tissue with con shows left perimandibular abscess on 1/10 has resolved; new smaller collection extending from margin of left submandibular gland into parapharyngeal and  spaces. In Ed she was started on methylpredisolone and IV clindamycin. She received O2 2L  NC.     On 22, Underwent I&D of left lateral pharyngeal space infection with place of drain with OMFS service. Received Unasyn 3 g intraop. Patient developed whole body rash 2/2 PCN allergy that was treated with Benadryl 50mg. Rash has resolved at time of my examination.          Review of Systems:   CONSTITUTIONAL:  No fevers or chills  EYES: negative for icterus  HEENT:  negative for hearing loss, tinnitus and sore throat. Limited opening of oral cavity  RESPIRATORY:  negative for cough with sputum, negative dyspnea while on O2 NC  CARDIOVASCULAR:  negative for chest pain, dyspnea  GASTROINTESTINAL:  negative for nausea, vomiting, diarrhea and constipation  GENITOURINARY:  negative for dysuria  HEME:  No easy bruising  INTEGUMENT:  negative for rash and pruritus  NEURO:  Negative for headache         Past Medical History:     Past Medical History:   Diagnosis Date     Anxiety      Elevated cholesterol      Hypertension      Thyroid disease             Past Surgical History:     Past Surgical History:   Procedure Laterality Date     CHOLECYSTECTOMY       ENT SURGERY      tooth extraction     INCISION AND DRAINAGE MANDIBLE, COMBINED Left 1/10/2022    Procedure: INCISION AND DRAINAGE, MANDIBLE;  Surgeon: Jn Feliz DDS;  Location:  OR            Family History:   Reviewed and non-contributory.   History reviewed. No pertinent family history.         Social History:     Social History     Tobacco Use     Smoking status: Current Every Day Smoker     Packs/day: 1.00     Smokeless tobacco: Never Used   Substance Use Topics     Alcohol use: Yes     Comment: occ     History   Sexual Activity     Sexual activity: Not on file              Antimicrobials:   Unasyn 3/04  Clindamycin 900mg IV:   Clindamycin 300 mg TID PO: 1/10-  Fluconazole 100 mg daily: 1/10-    Keflex 500mg QID: discontinued on 22  Metronidazole 500mg QID: Discontinued on 22        Microbiology:   22    Gram Stain: 2+  Gram positive cocci and 3+ WBC seen    Abscess Aerobic Bacterial Culture x2: pending    Fungal or Yeast culture x2: pending    Anaerobic Bacterial culture x2: pending    BCx2: NG    1/10/22    Abscess Aerobic bacterial culture (from neck): Strep intermedius, Isolated in broth only, Fusobacterium nucleatum    Gram Stain: 2+ gram positive cocci, 4+ WBC seen     Anaerobic bacterial culture (from neck): Fusobacterium nucleatum, anaerobic diphtheroids, Prevotella species.     1/04/22    Swab Aerobic bacterial culture from mandible: isolated in broth only, Candida albicans          Current Medications:       [Auto Hold] clindamycin  900 mg Intravenous Q8H     [Auto Hold] nicotine  1 patch Transdermal Daily     [Auto Hold] nicotine   Transdermal Q8H            Allergies:     Allergies   Allergen Reactions     Sulfa Drugs      Unasyn Hives     Penicillins Rash     Generalized rash  Rash, Generalized              Physical Exam:     GENERAL:  well-developed, well-nourished, in bed in no acute distress.   HEENT:  Head is normocephalic, atraumatic, Left transoral drain patent and intact  EYES:  Eyes have anicteric sclerae without conjunctival injection    ENT:  Oropharynx is moist without exudates or ulcers  NECK:  Supple. No cervical lymphadenopathy  LUNGS:  Clear to auscultation bilaterally   CARDIOVASCULAR:  Regular rate and rhythm with no murmurs, gallops or rubs  ABDOMEN:  Normal bowel sounds, soft, nontender. No appreciable hepatosplenomegaly  EXT: Warm, no edema  SKIN:  No acute rashes (Devloped rash 2/2 to Penicillin allergy but resolved at time of exam).  Line(s) are in place without any surrounding erythema or exudate. No stigmata of endocarditis.  NEUROLOGIC:  Grossly nonfocal. Active x4 extremities         Laboratory Data:   Inflammatory Markers    Recent Labs   Lab Test 01/10/22  0547   .0*       Hematology Studies    Recent Labs   Lab Test 02/03/22  2050 01/11/22  0612 01/10/22  0547   WBC 6.6 9.4 16.1*    HGB 12.4 12.5 14.9   * 103* 102*    275 306       Metabolic Studies     Recent Labs   Lab Test 22  0612 01/10/22  0547   * 136 127*   POTASSIUM 3.4 4.1 3.7   CHLORIDE 100 103 99   CO2 26 24 20   BUN 7 3* 7   CR 0.50* 0.45* 0.47*   GFRESTIMATED >90 >90 >90       Hepatic Studies    Recent Labs   Lab Test 2212 01/10/22  0547   BILITOTAL 0.3 0.3   ALKPHOS 86 107   ALBUMIN 2.4* 3.0*   AST 16 14   ALT 17 20       Microbiology:  Culture Micro   Date Value Ref Range Status   2006 >100,000 colonies/mL Enterococcus faecalis  Final   2006 No growth after 6 days  Final   2006 No growth after 6 days  Final       Urine Studies  No lab results found.    Vancomycin Levels  No lab results found.    Invalid input(s): VANCO    Hepatitis B Testing No lab results found.  Hepatitis C Testing     Hepatitis C Antibody   Date Value Ref Range Status   2006 Negative NEG Final     Respiratory Virus Testing    No results found for: RS, FLUAG           Pertinent Imagin22 CT soft tissue neck with contrast:   1. The previously seen large left perimandibular abscess on 01/10/2022 appears to have resolved. There is a new, smaller, collection extending cephalad from the margin of the left submandibular gland into the parapharyngeal and  spaces as detailed above compatible with abscess measuring 2.2 x 1.2 x 2.9 cm as detailed above. Adjacent soft tissue thickening and inflammation of the pterygoid musculature submandibular gland parapharyngeal space.  2. No additional collection.   3. Development of mild sclerosis and slightly permeative appearance of the left hemimandible adjacent to the left posterior mandibular molar extraction socket could represent early osteomyelitis.  4. Airway is widely patent.    1/10/22 CT soft tissue neck with contrast:   1. Left facial abscess along the left hemimandible. No significant narrowing of the airway. No  lymphadenopathy.  2. Fibrocalcific nodules in the left lung apex with fibrotic scarring may represent sequela of prior infection; possibility of active infection cannot be completely excluded.

## 2022-02-04 NOTE — ED TRIAGE NOTES
Pt arrives as a transfer from [facility]. Has a known mandibular abscess and is here for surgery in the AM. Also c/o SOB in the last day or so requiring O2 at 2L to maintain sats in the mid 90s. C/o HA and neck pain.

## 2022-02-04 NOTE — ED PROVIDER NOTES
History   Chief Complaint:  Shortness of Breath       HPI   Lara Melendez is a 56 year old female with history of smoking and Left facial hemimandibular abscess status post cutaneous I&D January 10 with drain placement, with subsequent reaccumulation and repeat I&D several days ago, presents with dyspnea.  Patient states that she has been feeling dyspneic since her January hospitalization but that symptoms became worse today.  She endorses primarily air hunger and wheezing.  She notes she has had a negative COVID-19 test and denies fevers or sputum production.  She denies chest pain, back pain, abdominal pain, or any other concerns.  In general, her postsurgical pain has been improving since the repeat incision and drainage earlier this week, including improved trismus and ability to take p.o.'s.    Review of Systems  MSK: + as per above  Resp: + as per above  All other systems reviewed and negative      Allergies:  Sulfa drugs  Penicillins    Medications:  Tenormin  Lipitor  Diflucan  Levothyroxine   Paxil    Past Medical History:     Anxiety  Hypertension  Thyroid disease  Abscess of left jaw  Hypothyroidism  Psoriasis   Tobacco use disorder  Mixed hyperlipidemia  Diverticulitis of colon    Past Surgical History:    Cholecystectomy  Tooth extraction  Incision and drainage mandible, combined  Vocal cord surgery    Family History:    Daughter: factor V    Social History:  Presents unaccompanied  PCP: Franca, [facility]    Physical Exam     Patient Vitals for the past 24 hrs:   BP Temp Temp src Pulse Resp SpO2   02/03/22 2132 -- -- -- 87 17 97 %   02/03/22 2124 -- -- -- 86 15 (!) 88 %   02/03/22 2115 -- -- -- 85 22 92 %   02/03/22 2100 -- -- -- 84 18 95 %   02/03/22 2045 -- -- -- 81 18 97 %   02/03/22 2030 (!) 136/96 -- -- 83 19 96 %   02/03/22 2014 (!) 171/105 98.2  F (36.8  C) Oral 90 20 99 %       Physical Exam  Constitutional: Alert, attentive, speaking full sentences but dyspneic  HENT:    Nose  normal.    Posterior pharynx shows no edema or erythema   Normal midline uvula   No peritonsillar erythema or swelling   No sublingual induration, swelling or tenderness   No trismus   Normal dentition without gingival swelling or caries   Able to extend neck without discomfort   Tolerating secretions and able to breathe supine with ease  Eyes: EOM are normal. Pupils are equal, round, and reactive to light.   CV: regular rate and rhythm; no murmurs, rubs or gallups  Chest: Effort normal mildly increased, prominent expiratory wheezes and slight stridor  GI:  There is no tenderness. No distension. Normal bowel sounds  MSK: Normal range of motion.   Neurological: Alert, attentive  Skin: Skin is warm and dry.      Emergency Department Course   ECG  ECG obtained at 2029, ECG read at 2037  Sinus rhythm. Abnormal QRS-T angle, consider primary T wave abnormality. Abnormal ECG.   No significant change as compared to prior, dated 01/10/22.  Rate 85 bpm. SD interval 176 ms. QRS duration 78 ms. QT/QTc 378/449 ms. P-R-T axes * 60 -1.     Imaging:  Soft tissue neck CT w contrast   Final Result   IMPRESSION:    1.  The previously seen large left perimandibular abscess on 01/10/2022 appears to have resolved. There is a new, smaller, collection extending cephalad from the margin of the left submandibular gland into the parapharyngeal and  spaces as    detailed above compatible with abscess measuring 2.2 x 1.2 x 2.9 cm as detailed above. Adjacent soft tissue thickening and inflammation of the pterygoid musculature submandibular gland parapharyngeal space.      2.  No additional collection.      3.  Development of mild sclerosis and slightly permeative appearance of the left hemimandible adjacent to the left posterior mandibular molar extraction socket could represent early osteomyelitis.      4.  Airway is widely patent.      XR Chest Port 1 View   Final Result   IMPRESSION: The cardiomediastinal silhouette and pulmonary  vascularity are normal. Chronic bronchiolar and alveolar calcifications within the posterior segment left upper lobe unchanged. No new focal consolidation, pneumothorax nor pleural effusion.        Report per radiology    Laboratory:  Labs Ordered and Resulted from Time of ED Arrival to Time of ED Departure   BASIC METABOLIC PANEL - Abnormal       Result Value    Sodium 131 (*)     Potassium 3.4      Chloride 100      Carbon Dioxide (CO2) 26      Anion Gap 5      Urea Nitrogen 7      Creatinine 0.50 (*)     Calcium 9.0      Glucose 101 (*)     GFR Estimate >90     CBC WITH PLATELETS AND DIFFERENTIAL - Abnormal    WBC Count 6.6      RBC Count 3.63 (*)     Hemoglobin 12.4      Hematocrit 37.1       (*)     MCH 34.2 (*)     MCHC 33.4      RDW 12.5      Platelet Count 286      % Neutrophils 55      % Lymphocytes 29      % Monocytes 10      % Eosinophils 5      % Basophils 0      % Immature Granulocytes 1      NRBCs per 100 WBC 0      Absolute Neutrophils 3.7      Absolute Lymphocytes 1.9      Absolute Monocytes 0.7      Absolute Eosinophils 0.3      Absolute Basophils 0.0      Absolute Immature Granulocytes 0.1      Absolute NRBCs 0.0     INFLUENZA A/B & SARS-COV2 PCR MULTIPLEX - Normal    Influenza A PCR Negative      Influenza B PCR Negative      SARS CoV2 PCR Negative     BLOOD CULTURE   BLOOD CULTURE        Emergency Department Course:    Reviewed:  I reviewed nursing notes, vitals, past medical history and Care Everywhere    Assessments:  2021 I obtained history and examined the patient as noted above.   2114 I rechecked the patient and explained findings.     Consults:  2247 I consulted with Dr. Sellers, oral surgery [facility], regarding the patients history and presentation in the emergency department.  2316 I consulted with Dr. Sellers oral surgery [facility], regarding the patients history and presentation in the emergency department.  2324 I consulted with the [facility] ED regarding the patients history and  presentation in the emergency department.    Interventions:  2048 Solu-medrol 125 mg IV  2049 Albuterol 6 puff inhalation  2125 Albuterol 6 puff inhalation   Decadron 10 mg IV   Clindamycin 900 mg IV    Disposition:  The patient was transferred to the [facility] via EMS. Dr. Sellers accepted the patient for transfer.     Impression & Plan     Medical Decision Making:  This this is a 56-year-old female with history of recent left jaw abscess status post I&D x2 with drains placed and removed in the interim, pending office recheck tomorrow, who presents for evaluation of trismus and shortness of breath.  Regarding her shortness of breath, this appears to be multifactorial, as the patient does have an expiratory wheezes that are amenable to bronchodilator therapy.  Suspect a component of COPD exacerbation given her longstanding smoking history and exacerbated symptoms today during very cold weather.  Chest x-ray shows no pneumonia and COVID-19 testing and influenza testing are negative.  In addition to large tract respiratory symptoms, the patient likely does have component of dyspnea from posterior pharyngeal and jaw swelling.  She has a patent airway, speaking full sentences, and has very mild trismus.  Because of this however, CT soft tissue neck is performed and shows recurrent abscess of new location.  She remained stable in the department but will require transfer to [facility] for [facility] evaluation and operative intervention.  IV antibiotics initiated along with Decadron.  Patient was stable for transfer at this time.    Covid-19  Lara Melendez was evaluated during a global COVID-19 pandemic, which necessitated consideration that the patient might be at risk for infection with the SARS-CoV-2 virus that causes COVID-19.   Applicable protocols for evaluation were followed during the patient's care.   COVID-19 was considered as part of the patient's evaluation. The plan for testing is:  a test was obtained during this  visit.    Diagnosis:    ICD-10-CM    1. COPD exacerbation (H)  J44.1    2. Abscess of jaw, left  M27.2        Scribe Disclosure:  I, Jodi Plunkett, am serving as a scribe at 8:18 PM on 2/3/2022 to document services personally performed by Kody Beard MD based on my observations and the provider's statements to me.              Kody Beard MD  02/03/22 [id number]

## 2022-02-04 NOTE — OP NOTE
Oral & Maxillofacial Surgery Operative Note:      Procedure:   -Trans cutaneous incision and drainage of left lateral pharyngeal space infection with placement of drain and debridement of left mandible. Cultures obtained and specimen sent for pathology     Pre-Operative Diagnosis:   Left lateral pharayngeal space infection     Post-Operative Diagnosis: Same      STAFF SURGEON: Taylor Ortez DDS  RESIDENT SURGEON: Lyn Cordero DDS     PERIOPERATIVE ANTIBIOTICS: Unasyn      ESTIMATED BLOOD LOSS: 25 ml     URINE OUTPUT: None.     LOCAL ANESTHESIA: 4 cc total of 0.5% bupivacaine with 1:200,000 epinephrine delivered subcutaneously      SPECIMENS: Cultures sent, Specimen sent     COMPLICATIONS: None     DRAINS: 1 extraoral drain placed and 1 intraoral drain placed (total x2), penrose drains sutured in place     INDICATIONS FOR PROCEDURE: 55 y/o female with a history of extraction tooth #18 the last week in December.  She developed swelling postoperatively and was placed on a zpak.  The swelling worsened and she was placed on levaquin and had I&D by outside surgeon.  She then was placed on keflex and flagyl, and cultures came back as candida, so fluconazole was added.  On 1/8 she had worsening swelling and a medrol dosepak was added.  She then had worsening of the swelling and was taken to the OR at [facility] on 1/10/2022 for I&D of the left submanduibular and sublingual space.  On 1/13 she was doing quite well with significant decrease in swelling, soft neck spaces, and maximum incisal opening ~40mm and had the drains removed.   She continued to do well and finished her course of clindamycin on 1/20/2022.  On 1/24/2022 she was noted to have a decrease in JOSE ARMANDO and an increase in swelling and drainage.  Clindamycin was restarted with warm compresses.  She then presented on 1/28 with increased submandibular swelling and a drain was placed in the submandibular space.  Her swelling worsened and on 2/4 she was noted to have  recurrent left lateral pharyngeal abscess indicated for extraoral incision and drainage in the OR    Discussion was had with the patient and the Anesthesia service preoperatively about the concern for clindamycin resistance given the trends with antimicrobial resistance and odontogenic infections and that she had been on clindamycin for 23 days without resolution.  Patient reports that she developed a rash at some point during a course of penicillin at age 10, but that she was simultaneously having a flair of her psoriasis so they were not sure it was a true allergic reaction.  As such, it was determined that intraoperatively the patient would be given a test dose of unasyn and closely monitored for an allergic reaction.        DESCRIPTION OF PROCEDURE: The patient was met preoperatively and the treatment plan was confirmed with the patient.  Patient was brought back to operating room by the Anesthesia Service. Patient transferred herself to the table and was induced to general anesthesia. Following induction of anesthesia, an oral endotracheal tube was placed and taped by the Anesthesia Service. The patient's arms were tucked and padded. The face was painted in usual sterile fashion.  Surgeons stepped out to scrub and returned to don sterile gown and gloves. At that time, the surgical site was squared off and a split sheet drape was placed. A test dose of unasyn was given and the patient had no rash develop and no changes in hemodynamic stability.  As such, the patient was administered the rest of the unasyn dose.       First attention to the left neck incision, purulent drainage was noted from the site, cultures were taken at this time. The incision was expanded with cautery. A curved hemostat was then utilized to bluntly dissect down to mandibular inferior border and to the lateral aspect. Hemostat was utilized to open sublingual, lateral pharyngeal aspects of mandible. Copious amount of purulence drainage was  expelled. Irrigated area with large volume of sterile saline.  Attention was directed to the intraoral left mandible. Crestal inicison over site #17 with distal buccal release made with cautery. Periosteum reflected with #9. Extraction site curetted and specimen of mandible was obtained with rongeurs. Buccal ridge was debrided with handpiece, egg bur, and irrigated with copious amounts of saline. The bone appeared overall normal--there were some slight osteolytic changes on the buccal aspect of the mandible limited to around site #18.  The rest of the bone was normal.    Two 1/2 inch penrose drains were placed in left lateral pharyngeal space, one from neck incision and one from intraoral incision, secured with 2-0 silk sutures.      Fluffs placed over drains and neuro netting around jaw. The patient was noted to have no rash at the conclusion of the procedure     The patient was turned over to the Anesthesia Service and was awakened in the OR and transferred stable to the PACU.     Attending surgeon was present for the entirety of the procedure.     Complications: None.      Lyn Cordero DDS  [facility]

## 2022-02-04 NOTE — ED PROVIDER NOTES
ED Provider Note  [facility]      History     Chief Complaint   Patient presents with     Mandibular Abscess     HPI  Lara Melendez is a 56 year old female with history of COPD, thyroid disease, hypertension, amongst others, presents in transfer from [facility] for evaluation by oral surgery for mandibular abscess.  Per their note:    Lara Melendez is a 56 year old female with history of smoking and Left facial hemimandibular abscess status post cutaneous I&D January 10 with drain placement, with subsequent reaccumulation and repeat I&D several days ago, presents with dyspnea.  Patient states that she has been feeling dyspneic since her January hospitalization but that symptoms became worse today.  She endorses primarily air hunger and wheezing.  She notes she has had a negative COVID-19 test and denies fevers or sputum production.  She denies chest pain, back pain, abdominal pain, or any other concerns.  In general, her postsurgical pain has been improving since the repeat incision and drainage earlier this week, including improved trismus and ability to take p.o.'s.     Medical Decision Making:  This this is a 56-year-old female with history of recent left jaw abscess status post I&D x2 with drains placed and removed in the interim, pending office recheck tomorrow, who presents for evaluation of trismus and shortness of breath.  Regarding her shortness of breath, this appears to be multifactorial, as the patient does have an expiratory wheezes that are amenable to bronchodilator therapy.  Suspect a component of COPD exacerbation given her longstanding smoking history and exacerbated symptoms today during very cold weather.  Chest x-ray shows no pneumonia and COVID-19 testing and influenza testing are negative.  In addition to large tract respiratory symptoms, the patient likely does have component of dyspnea from posterior pharyngeal and jaw swelling.  She has a patent airway,  speaking full sentences, and has very mild trismus.  Because of this however, CT soft tissue neck is performed and shows recurrent abscess of new location.  She remained stable in the department but will require transfer to [place] for [facility] evaluation and operative intervention.  IV antibiotics initiated along with Decadron.  Patient was stable for transfer at this time.    She states that the primary thing that brought her into the other ER was the breathing issue.  She states she has had some shortness of breath on and off since the surgery on 11 January, but the last two or 3 days have been really bad where she felt like she has been able to catch her breath.  She states now that she has the oxygen on (while in the ER) her breathing feels okay, but as soon she takes it off she feels much worse.  She states she does have a chronic cough but this is not worsened.  She states she chronically has some sputum production but is gotten no sputum up in the last 3 to 4 days.  She denies history of DVT or PE in the past, any recent travel.  She was hospitalized for couple of days following the surgery she had on January 11.  She denies fever.  She states that the jaw pain actually is much better than it was before.  No lower extremity pain or swelling.  No abdominal pain, nausea, vomiting, diarrhea.  She states she has not smoked in about a week.    Past Medical History  Past Medical History:   Diagnosis Date     Anxiety      Elevated cholesterol      Hypertension      Thyroid disease      Past Surgical History:   Procedure Laterality Date     CHOLECYSTECTOMY       ENT SURGERY      tooth extraction     INCISION AND DRAINAGE MANDIBLE, COMBINED Left 1/10/2022    Procedure: INCISION AND DRAINAGE, MANDIBLE;  Surgeon: Jn Feliz DDS;  Location: [facility] OR     acetaminophen (TYLENOL) 500 MG tablet  atenolol (TENORMIN) 50 MG tablet  atorvastatin (LIPITOR) 20 MG tablet  fluconazole (DIFLUCAN) 100 MG tablet  ibuprofen  "(ADVIL/MOTRIN) 200 MG tablet  levothyroxine (SYNTHROID/LEVOTHROID) 112 MCG tablet  oxyCODONE (ROXICODONE-INTENSOL) 20 mg/mL CONC (HIGH CONC) solution  PARoxetine (PAXIL) 10 MG tablet      Allergies   Allergen Reactions     Sulfa Drugs      Penicillins Rash     Generalized rash  Rash, Generalized       Family History  No family history on file.  Social History   Social History     Tobacco Use     Smoking status: Current Every Day Smoker     Packs/day: 1.00     Smokeless tobacco: Never Used   Substance Use Topics     Alcohol use: Yes     Comment: occ     Drug use: Never      Past medical history, past surgical history, medications, allergies, family history, and social history were reviewed with the patient. No additional pertinent items.       Review of Systems  A complete review of systems was performed with pertinent positives and negatives noted in the HPI, and all other systems negative.    Physical Exam   BP: (!) 147/112  Pulse: 93  Temp: 98.4  F (36.9  C)  Resp: 16  Height: 165.1 cm (5' 5\")  Weight: 59 kg (130 lb)  SpO2: 95 %  Physical Exam  Constitutional:       General: She is not in acute distress.     Appearance: She is not diaphoretic.   HENT:      Head: Atraumatic.      Mouth/Throat:      Comments: Trismus, some induration to the left submandibular region with purulent drainage coming from previous drain site. Mild tenderness  Eyes:      General: No scleral icterus.  Cardiovascular:      Heart sounds: Normal heart sounds.   Pulmonary:      Effort: No respiratory distress.      Breath sounds: Wheezing (diffuse exp wheeze) present.   Abdominal:      General: Bowel sounds are normal.      Palpations: Abdomen is soft.      Tenderness: There is no abdominal tenderness.   Musculoskeletal:         General: No tenderness.   Skin:     General: Skin is warm.      Findings: No rash.         ED Course      Procedures                     Results for orders placed or performed during the hospital encounter of 02/03/22 "   XR Chest Port 1 View     Status: None    Narrative    EXAM: XR CHEST PORT 1 VIEW  LOCATION: [facility]  DATE/TIME: 2/3/2022 9:27 PM    INDICATION: Dyspnea.  COMPARISON: 04/12/2021      Impression    IMPRESSION: The cardiomediastinal silhouette and pulmonary vascularity are normal. Chronic bronchiolar and alveolar calcifications within the posterior segment left upper lobe unchanged. No new focal consolidation, pneumothorax nor pleural effusion.   Soft tissue neck CT w contrast     Status: None    Narrative    EXAM: CT SOFT TISSUE NECK W CONTRAST  LOCATION: [facility]  DATE/TIME: 2/3/2022 9:55 PM    INDICATION: Left mandibular abscess S/P I and D, stridor, dyspnea.  COMPARISON: None.  CONTRAST: 80 mL Isovue 370.  TECHNIQUE: Routine CT Soft Tissue Neck with IV contrast. Multiplanar reformats. Dose reduction techniques were used.    FINDINGS:   Previously seen large left perimandibular abscess has resolved. There is a new collection extending superiorly from the medial margin of the submandibular gland along the medial margin of the medial pterygoid muscle into the  space filling the   left parapharyngeal space. This measures 2.0 x 1.2 x 2.9 cm AP x ML x craniocaudad. The medial pterygoid is mildly prominent and enhancing as is the left submandibular gland and submandibular space. There has been development of mild sclerosis and   slightly permeative appearance of the left hemimandible adjacent to the left posterior mandibular extraction socket for which early osteomyelitis is possible. Subcutaneous stranding and skin thickening over the left perimandibular face soft tissues is   likely related to previous cutaneous incision and drainage procedure. The airway is widely patent.    MUCOSAL SPACES/SOFT TISSUES: Normal mucosal spaces of the upper aerodigestive tract. No mucosal mass or inflammation identified. Normal vocal cords and infraglottic trachea.    LYMPH  NODES: No pathologic lymph nodes by size or morphology criteria.     SALIVARY GLANDS: Submandibular gland and both parotid glands are normal.    THYROID: Normal.     VESSELS: Vascular structures of the neck are patent.    VISUALIZED INTRACRANIAL/ORBITS/SINUSES: No abnormality of the visualized intracranial compartment or orbits. Visualized paranasal sinuses and mastoid air cells are clear.    OTHER: Calcified nodules in the left upper lobe. Noncalcified nodule in the right upper lobe 4 mm is stable.      Impression    IMPRESSION:   1.  The previously seen large left perimandibular abscess on 01/10/2022 appears to have resolved. There is a new, smaller, collection extending cephalad from the margin of the left submandibular gland into the parapharyngeal and  spaces as   detailed above compatible with abscess measuring 2.2 x 1.2 x 2.9 cm as detailed above. Adjacent soft tissue thickening and inflammation of the pterygoid musculature submandibular gland parapharyngeal space.    2.  No additional collection.    3.  Development of mild sclerosis and slightly permeative appearance of the left hemimandible adjacent to the left posterior mandibular molar extraction socket could represent early osteomyelitis.    4.  Airway is widely patent.   Basic metabolic panel (BMP)     Status: Abnormal   Result Value Ref Range    Sodium 131 (L) 133 - 144 mmol/L    Potassium 3.4 3.4 - 5.3 mmol/L    Chloride 100 94 - 109 mmol/L    Carbon Dioxide (CO2) 26 20 - 32 mmol/L    Anion Gap 5 3 - 14 mmol/L    Urea Nitrogen 7 7 - 30 mg/dL    Creatinine 0.50 (L) 0.52 - 1.04 mg/dL    Calcium 9.0 8.5 - 10.1 mg/dL    Glucose 101 (H) 70 - 99 mg/dL    GFR Estimate >90 >60 mL/min/1.73m2   Symptomatic; Unknown Influenza A/B & SARS-CoV2 (COVID-19) Virus PCR Multiplex Nasopharyngeal     Status: Normal    Specimen: Nasopharyngeal; Swab   Result Value Ref Range    Influenza A PCR Negative Negative    Influenza B PCR Negative Negative    SARS CoV2 PCR  Negative Negative    Narrative    Testing was performed using the davion SARS-CoV-2 & Influenza A/B Assay on the davion [employer] System. This test should be ordered for the detection of SARS-CoV-2 and influenza viruses in individuals who meet clinical and/or epidemiological criteria. Test performance is unknown in asymptomatic patients. This test is for in vitro diagnostic use under the FDA EUA for laboratories certified under CLIA to perform moderate and/or high complexity testing. This test has not been FDA cleared or approved. A negative result does not rule out the presence of PCR inhibitors in the specimen or target RNA in concentration below the limit of detection for the assay. If only one viral target is positive but coinfection with multiple targets is suspected, the sample should be re-tested with another FDA cleared, approved or authorized test, if coinfection would change clinical management. [facility] are certified under the Clinical Laboratory Improvement Amendments of 1988 (CLIA-88) as  qualified to perform moderate and/or high complexity laboratory testing.   CBC with platelets and differential     Status: Abnormal   Result Value Ref Range    WBC Count 6.6 4.0 - 11.0 10e3/uL    RBC Count 3.63 (L) 3.80 - 5.20 10e6/uL    Hemoglobin 12.4 11.7 - 15.7 g/dL    Hematocrit 37.1 35.0 - 47.0 %     (H) 78 - 100 fL    MCH 34.2 (H) 26.5 - 33.0 pg    MCHC 33.4 31.5 - 36.5 g/dL    RDW 12.5 10.0 - 15.0 %    Platelet Count 286 150 - 450 10e3/uL    % Neutrophils 55 %    % Lymphocytes 29 %    % Monocytes 10 %    % Eosinophils 5 %    % Basophils 0 %    % Immature Granulocytes 1 %    NRBCs per 100 WBC 0 <1 /100    Absolute Neutrophils 3.7 1.6 - 8.3 10e3/uL    Absolute Lymphocytes 1.9 0.8 - 5.3 10e3/uL    Absolute Monocytes 0.7 0.0 - 1.3 10e3/uL    Absolute Eosinophils 0.3 0.0 - 0.7 10e3/uL    Absolute Basophils 0.0 0.0 - 0.2 10e3/uL    Absolute Immature Granulocytes 0.1 <=0.4 10e3/uL    Absolute  NRBCs 0.0 10e3/uL   EKG 12 lead     Status: None   Result Value Ref Range    Systolic Blood Pressure  mmHg    Diastolic Blood Pressure  mmHg    Ventricular Rate 85 BPM    Atrial Rate 85 BPM    NJ Interval 176 ms    QRS Duration 78 ms     ms    QTc 449 ms    P Axis  degrees    R AXIS 60 degrees    T Axis -1 degrees    Interpretation ECG       Sinus rhythm  Abnormal QRS-T angle, consider primary T wave abnormality  Abnormal ECG  When compared with ECG of 10-MAIK-2022 17:59,  T wave inversion no longer evident in Anterior leads     CBC + differential     Status: Abnormal    Narrative    The following orders were created for panel order CBC + differential.  Procedure                               Abnormality         Status                     ---------                               -----------         ------                     CBC with platelets and d...[[id number]]  Abnormal            Final result                 Please view results for these tests on the individual orders.     Medications   nicotine Patch in Place (has no administration in time range)   nicotine (NICODERM CQ) 21 MG/24HR 24 hr patch 1 patch (1 patch Transdermal Patch/Med Applied 2/4/22 0356)   clindamycin (CLEOCIN) infusion 900 mg (has no administration in time range)   sodium chloride 0.9% infusion ( Intravenous New Bag 2/4/22 0533)   ipratropium - albuterol 0.5 mg/2.5 mg/3 mL (DUONEB) neb solution 3 mL (3 mLs Nebulization Given 2/4/22 0336)   acetaminophen (TYLENOL) tablet 1,000 mg (1,000 mg Oral Given 2/4/22 0336)   dexamethasone PF (DECADRON) injection 10 mg (10 mg Intravenous Given 2/4/22 0533)        Assessments & Plan (with Medical Decision Making)   Chest x-ray at the outside hospital without acute findings, Covid and influenza negative.  She does have wheezes on auscultation.  No obvious stridor.  Oral surgery did come evaluate the patient, feel she needs to go back to the operating room for further surgical management of the new fluid  collection.  They did question whether or not the wheeze was more related to some degree of airway narrowing as a result of the infectious process.  The patient was given additional steroids here, additional neb treatment.  She feels comfortable on nasal cannula here.  No obvious stridor.  She will be admitted to medicine for further acute management, with and oral surgery plans to take her to the OR this morning.  Nothing on exam to suggest impending critical airway compromise.    Dictation Disclaimer: Some of this Note has been completed with voice-recognition dictation software. Although errors are generally corrected real-time, there is the potential for a rare error to be present in the completed chart.      I have reviewed the nursing notes. I have reviewed the findings, diagnosis, plan and need for follow up with the patient.    New Prescriptions    No medications on file       Final diagnoses:   Dyspnea, unspecified type   Chronic obstructive pulmonary disease, unspecified COPD type (H)   Mandibular abscess       --  Shira Hawk  [facility] EMERGENCY DEPARTMENT  2/3/2022     Shira Hawk MD  02/04/22 0652

## 2022-02-04 NOTE — ANESTHESIA PROCEDURE NOTES
Airway         Procedure Start/Stop Times: 2/4/2022 10:18 AM  Staff -        Anesthesiologist:  Anabelle Snider MD       CRNA: Antwon Sanchez APRN CRNA       Performed By: CRNAIndications and Patient Condition       Indications for airway management: analia-procedural       Induction type:intravenous       Mask difficulty assessment: 0 - not attempted    Final Airway Details       Final airway type: endotracheal airway       Successful airway: Oral and ETT - single  Endotracheal Airway Details        ETT size (mm): 7.0       Cuffed: yes       Successful intubation technique: video laryngoscopy       VL Blade Size: MAC 3       Grade View of Cords: 1       Adjucts: stylet       Position: Right       Measured from: gums/teeth       Secured at (cm): 21       Bite block used: None    Post intubation assessment        Placement verified by: capnometry, equal breath sounds and chest rise        Number of attempts at approach: 1       Number of other approaches attempted: 0       Secured with: pink tape       Ease of procedure: easy       Dentition: Intact

## 2022-02-04 NOTE — ANESTHESIA POSTPROCEDURE EVALUATION
Patient: Lara Melendez    Procedure: Procedure(s):  INCISION AND DRAINAGE, MANDIBLE       Diagnosis:Mandibular abscess [M27.2]  Diagnosis Additional Information: No value filed.    Anesthesia Type:  No value filed.    Note:  Disposition: Inpatient   Postop Pain Control: Uneventful            Sign Out: Well controlled pain   PONV: No   Neuro/Psych: Uneventful            Sign Out: Acceptable/Baseline neuro status   Airway/Respiratory: Uneventful            Sign Out: Acceptable/Baseline resp. status   CV/Hemodynamics: Uneventful            Sign Out: Acceptable CV status; No obvious hypovolemia; No obvious fluid overload   Other NRE: NONE   DID A NON-ROUTINE EVENT OCCUR? No           Last vitals:  Vitals Value Taken Time   /92 02/04/22 1327   Temp 36  C (96.8  F) 02/04/22 1200   Pulse 113 02/04/22 1329   Resp 16 02/04/22 1329   SpO2 96 % 02/04/22 1329   Vitals shown include unvalidated device data.    Electronically Signed By: Juli Aguilar MD  February 4, 2022  1:31 PM

## 2022-02-04 NOTE — PROGRESS NOTES
[facility]    Medicine Progress Note - Hospitalist Service, GOLD TEAM 7    Date of Admission:  2/4/2022    Assessment & Plan          Lara Melendez is a 56 year old female admitted on 2/4/2022. She has a history of history of tobacco use, thyroid disease, hypertension, and recent left aubrey-mandibular abscess s/p cutaneous I&D x 2 (first on 1/10/202) and is admitted for trismus and shortness of breath as well as ongoing swelling from abscess.     Submandibular abscess   Prior culture growth from I&D 1/10 w/ Fusobacterium, Strep intermedius, anaerobic diphtheroids, Prevotella; completed course of clindamycin x 10 days after I&D. Also w/ Candida growth on 1/4 from mandible swab so completed course of fluconazole. CT 2/3/22 w/ new left-sided abscess site.     - OMFS on board,   - 2/4/22> OR > I&D of left lateral pharyngeal space infection with placement of drain and debridement of left mandible   - Unasyn was started in OR given controlled situation however led hives which resolved after Benadryl   - ID consulted to porsha in selection of antibiotics given recurrent abscess and allergy to PCNs   - s/p Decadron, will continue with methylprednisone to cover swelling and COPD exacerbation both   - Follow up blood culture (x 2 ) from 2/3    - Tylenol PRN; pain well controlled currently, can add oxycodone or dilaudid if needed       Possible COPD exacerbation   No history of COPD diagnosis, however seems likely given tobacco use history   Would benefit from outpatient evaluation for COPD, initiating long-acting therapy like salmeterol or tiotropium  - S/p methylpred in outside ED, decadron in [facility] ED; will start on methyl prednisone 40 mg q 12 and switch to PO by tomorrow   - Albuterol Q4H PRN wheezing; consider discharging w/ short-acting bronchodilator   - Titrate O2 to maintain sat >90% (on 2L currently)            Diet:  NPO  DVT Prophylaxis: Heparin SQ  Hines Catheter: Not  present  Central Lines: None  Cardiac Monitoring: None  Code Status:   Full    Disposition Plan   Expected Discharge:  TBD   Anticipated discharge location:  Awaiting care coordination huddle  Delays:     TBD        The patient's care was discussed with the Patient.    LES ROWAN MD  Hospitalist Service, GOLD TEAM 7  [facility]  Securely message with the Vocera Web Console (learn more here)  Text page via [facility] Paging/Directory   Please see signed in provider for up to date coverage information      Clinically Significant Risk Factors Present on Admission         # Hyponatremia: Na = 131 mmol/L (Ref range: 133 - 144 mmol/L) on admission, will monitor as appropriate             ______________________________________________________________________    Interval History   Patient drowsy but reusable. Reports pain in mandible around 1.5/10. would like to eat. .         Data reviewed today: I reviewed all medications, new labs and imaging results over the last 24 hours. I personally reviewed no images or EKG's today.    Physical Exam   Vital Signs: Temp: 96.8  F (36  C) Temp src: Axillary BP: (!) 141/90 Pulse: 115   Resp: 16 SpO2: 97 % O2 Device: Nasal cannula Oxygen Delivery: 4 LPM  Weight: 130 lbs 0 oz  General Appearance: AO x3. Bulky dressing on left mandible.   Respiratory: CTA bilaterally, no crackles. Scant wheezes present  Cardiovascular: S1 and S2 normal  GI: no murmur,   Skin: normal turgor  Other: none     Data   Recent Labs   Lab 02/04/22  0915 02/03/22  2050   WBC  --  6.6   HGB  --  12.4   MCV  --  102*   PLT  --  286   NA  --  131*   POTASSIUM  --  3.4   CHLORIDE  --  100   CO2  --  26   BUN  --  7   CR  --  0.50*   ANIONGAP  --  5   EDIN  --  9.0   * 101*     Recent Results (from the past 24 hour(s))   XR Chest Port 1 View    Narrative    EXAM: XR CHEST PORT 1 VIEW  LOCATION: [facility]  DATE/TIME: 2/3/2022 9:27  PM    INDICATION: Dyspnea.  COMPARISON: 04/12/2021      Impression    IMPRESSION: The cardiomediastinal silhouette and pulmonary vascularity are normal. Chronic bronchiolar and alveolar calcifications within the posterior segment left upper lobe unchanged. No new focal consolidation, pneumothorax nor pleural effusion.   Soft tissue neck CT w contrast    Narrative    EXAM: CT SOFT TISSUE NECK W CONTRAST  LOCATION: [facility]  DATE/TIME: 2/3/2022 9:55 PM    INDICATION: Left mandibular abscess S/P I and D, stridor, dyspnea.  COMPARISON: None.  CONTRAST: 80 mL Isovue 370.  TECHNIQUE: Routine CT Soft Tissue Neck with IV contrast. Multiplanar reformats. Dose reduction techniques were used.    FINDINGS:   Previously seen large left perimandibular abscess has resolved. There is a new collection extending superiorly from the medial margin of the submandibular gland along the medial margin of the medial pterygoid muscle into the  space filling the   left parapharyngeal space. This measures 2.0 x 1.2 x 2.9 cm AP x ML x craniocaudad. The medial pterygoid is mildly prominent and enhancing as is the left submandibular gland and submandibular space. There has been development of mild sclerosis and   slightly permeative appearance of the left hemimandible adjacent to the left posterior mandibular extraction socket for which early osteomyelitis is possible. Subcutaneous stranding and skin thickening over the left perimandibular face soft tissues is   likely related to previous cutaneous incision and drainage procedure. The airway is widely patent.    MUCOSAL SPACES/SOFT TISSUES: Normal mucosal spaces of the upper aerodigestive tract. No mucosal mass or inflammation identified. Normal vocal cords and infraglottic trachea.    LYMPH NODES: No pathologic lymph nodes by size or morphology criteria.     SALIVARY GLANDS: Submandibular gland and both parotid glands are normal.    THYROID: Normal.      VESSELS: Vascular structures of the neck are patent.    VISUALIZED INTRACRANIAL/ORBITS/SINUSES: No abnormality of the visualized intracranial compartment or orbits. Visualized paranasal sinuses and mastoid air cells are clear.    OTHER: Calcified nodules in the left upper lobe. Noncalcified nodule in the right upper lobe 4 mm is stable.      Impression    IMPRESSION:   1.  The previously seen large left perimandibular abscess on 01/10/2022 appears to have resolved. There is a new, smaller, collection extending cephalad from the margin of the left submandibular gland into the parapharyngeal and  spaces as   detailed above compatible with abscess measuring 2.2 x 1.2 x 2.9 cm as detailed above. Adjacent soft tissue thickening and inflammation of the pterygoid musculature submandibular gland parapharyngeal space.    2.  No additional collection.    3.  Development of mild sclerosis and slightly permeative appearance of the left hemimandible adjacent to the left posterior mandibular molar extraction socket could represent early osteomyelitis.    4.  Airway is widely patent.     Medications     lactated ringers Stopped (02/04/22 1153)       [Auto Hold] clindamycin  900 mg Intravenous Q8H     [Auto Hold] nicotine  1 patch Transdermal Daily     [Auto Hold] nicotine   Transdermal Q8H

## 2022-02-04 NOTE — ANESTHESIA CARE TRANSFER NOTE
Patient: Lara Melendez    Procedure: Procedure(s):  INCISION AND DRAINAGE, MANDIBLE       Diagnosis: Mandibular abscess [M27.2]  Diagnosis Additional Information: No value filed.    Anesthesia Type:   No value filed.     Note:    Oropharynx: oropharynx clear of all foreign objects and spontaneously breathing  Level of Consciousness: awake  Oxygen Supplementation: face mask  Level of Supplemental Oxygen (L/min / FiO2): 6  Independent Airway: airway patency satisfactory and stable  Dentition: dentition unchanged  Vital Signs Stable: post-procedure vital signs reviewed and stable  Report to RN Given: handoff report given  Patient transferred to: PACU    Handoff Report: Identifed the Patient, Identified the Reponsible Provider, Reviewed the pertinent medical history, Discussed the surgical course, Reviewed Intra-OP anesthesia mangement and issues during anesthesia, Set expectations for post-procedure period and Allowed opportunity for questions and acknowledgement of understanding      Vitals:  Vitals Value Taken Time   /105 02/04/22 1120   Temp     Pulse 113 02/04/22 1127   Resp 19 02/04/22 1127   SpO2 98 % 02/04/22 1127   Vitals shown include unvalidated device data.    Electronically Signed By: ZACHARY Almendarez CRNA  February 4, 2022  11:28 AM

## 2022-02-04 NOTE — ED NOTES
Pt requested to use restroom and was able to ambulate independently without issue. When pt returned she stated she felt very SOB, oxygen saturation was 87% on RA, increased to 93% on 2 lpm via NC. MD advised.

## 2022-02-04 NOTE — ED NOTES
Bed: ED20  Expected date:   Expected time:   Means of arrival:   Comments:  Lara Melendez, mandibular abscess

## 2022-02-04 NOTE — PLAN OF CARE
Status: Pt admitted 02/04 s/p I&D of left mandicular abscess.    PMHx: COPD, HTN, tobacco use, recent L mandibular abscess s/p I&D x2.  Vitals: OVSS on room air, on continuous pulse ox for now  Neuros: A&O x4.   IV: L PIV SL between IV abx  Labs/Electrolytes: last sodium level (02/03) was 131  Resp/trach: Denies shortness of breath.   Diet: Dental/mechanical soft  Bowel status: BS+  : VDSP  Skin: Penrose to neck covered with dressing, dressing to be changed as needed  Pain: c/o pain to left face at incisional site managed with prn tylenol  Activity: SBA since new postop  Flu Shot Status (given or declined):   Social: Pt updated her   Plan: Manage pain. Monitor drainage and change dressing as needed. IV abx.    Updates this shift: Admitted to unit from PACU      Arrived from: PACU  Belongings/meds: Clothing, cellphone, shoes, hand bag  2 RN Skin Assessment Completed: EDINSON Corrales and EDINSON Hollins  Non-intact findings documented (yes/no/NA): Pt reported to have hives after getting unasyn intra-op but hives resolved upon arrival to unit.Penrose to L neck

## 2022-02-04 NOTE — OR NURSING
Patient arrived to PACU at 1120 post receiving Unasyn abx intraop (w test dose and no reaction noted intraop) but developed severe itching and hives at [id number] over entire back, bilateral flank and superior chest with spreading to bilateral arms. PAR RMDA immediately notified and 25 mg of iv benadryl given, cold washcloths to skin and nonscented lotion for additional itching relief.    Reports relief of itching by 1155, denies change in breathing or throat closing. Return of itching at 1215, second dose of benadryl given at 1225 with continued relief of hives.    Notified oral surgery team, anesthesia, and primary medicine team of the above and added unasyn to allergy list.

## 2022-02-05 LAB
ANION GAP SERPL CALCULATED.3IONS-SCNC: 4 MMOL/L (ref 3–14)
BASOPHILS # BLD AUTO: 0 10E3/UL (ref 0–0.2)
BASOPHILS NFR BLD AUTO: 0 %
BUN SERPL-MCNC: 10 MG/DL (ref 7–30)
CALCIUM SERPL-MCNC: 9.2 MG/DL (ref 8.5–10.1)
CHLORIDE BLD-SCNC: 105 MMOL/L (ref 94–109)
CO2 SERPL-SCNC: 26 MMOL/L (ref 20–32)
CREAT SERPL-MCNC: 0.51 MG/DL (ref 0.52–1.04)
EOSINOPHIL # BLD AUTO: 0 10E3/UL (ref 0–0.7)
EOSINOPHIL NFR BLD AUTO: 0 %
ERYTHROCYTE [DISTWIDTH] IN BLOOD BY AUTOMATED COUNT: 13.7 % (ref 10–15)
GFR SERPL CREATININE-BSD FRML MDRD: >90 ML/MIN/1.73M2
GLUCOSE BLD-MCNC: 120 MG/DL (ref 70–99)
HCT VFR BLD AUTO: 34.7 % (ref 35–47)
HGB BLD-MCNC: 11.2 G/DL (ref 11.7–15.7)
IMM GRANULOCYTES # BLD: 0.1 10E3/UL
IMM GRANULOCYTES NFR BLD: 1 %
LYMPHOCYTES # BLD AUTO: 1 10E3/UL (ref 0.8–5.3)
LYMPHOCYTES NFR BLD AUTO: 6 %
MCH RBC QN AUTO: 33.4 PG (ref 26.5–33)
MCHC RBC AUTO-ENTMCNC: 32.3 G/DL (ref 31.5–36.5)
MCV RBC AUTO: 104 FL (ref 78–100)
MONOCYTES # BLD AUTO: 1.1 10E3/UL (ref 0–1.3)
MONOCYTES NFR BLD AUTO: 7 %
NEUTROPHILS # BLD AUTO: 15.2 10E3/UL (ref 1.6–8.3)
NEUTROPHILS NFR BLD AUTO: 86 %
NRBC # BLD AUTO: 0 10E3/UL
NRBC BLD AUTO-RTO: 0 /100
PLATELET # BLD AUTO: 306 10E3/UL (ref 150–450)
POTASSIUM BLD-SCNC: 4.2 MMOL/L (ref 3.4–5.3)
RBC # BLD AUTO: 3.35 10E6/UL (ref 3.8–5.2)
SODIUM SERPL-SCNC: 135 MMOL/L (ref 133–144)
WBC # BLD AUTO: 17.4 10E3/UL (ref 4–11)

## 2022-02-05 PROCEDURE — 250N000013 HC RX MED GY IP 250 OP 250 PS 637: Performed by: INTERNAL MEDICINE

## 2022-02-05 PROCEDURE — 36415 COLL VENOUS BLD VENIPUNCTURE: CPT | Performed by: STUDENT IN AN ORGANIZED HEALTH CARE EDUCATION/TRAINING PROGRAM

## 2022-02-05 PROCEDURE — 250N000012 HC RX MED GY IP 250 OP 636 PS 637: Performed by: INTERNAL MEDICINE

## 2022-02-05 PROCEDURE — 250N000013 HC RX MED GY IP 250 OP 250 PS 637: Performed by: STUDENT IN AN ORGANIZED HEALTH CARE EDUCATION/TRAINING PROGRAM

## 2022-02-05 PROCEDURE — 85014 HEMATOCRIT: CPT | Performed by: STUDENT IN AN ORGANIZED HEALTH CARE EDUCATION/TRAINING PROGRAM

## 2022-02-05 PROCEDURE — 80048 BASIC METABOLIC PNL TOTAL CA: CPT | Performed by: STUDENT IN AN ORGANIZED HEALTH CARE EDUCATION/TRAINING PROGRAM

## 2022-02-05 PROCEDURE — 250N000011 HC RX IP 250 OP 636: Performed by: INTERNAL MEDICINE

## 2022-02-05 PROCEDURE — 99232 SBSQ HOSP IP/OBS MODERATE 35: CPT | Performed by: INTERNAL MEDICINE

## 2022-02-05 PROCEDURE — 120N000002 HC R&B MED SURG/OB UMMC

## 2022-02-05 RX ORDER — HEPARIN SODIUM 5000 [USP'U]/.5ML
5000 INJECTION, SOLUTION INTRAVENOUS; SUBCUTANEOUS EVERY 8 HOURS
Status: DISCONTINUED | OUTPATIENT
Start: 2022-02-05 | End: 2022-02-07 | Stop reason: HOSPADM

## 2022-02-05 RX ORDER — OXYCODONE HYDROCHLORIDE 5 MG/1
5 TABLET ORAL EVERY 4 HOURS PRN
Status: DISCONTINUED | OUTPATIENT
Start: 2022-02-05 | End: 2022-02-07 | Stop reason: HOSPADM

## 2022-02-05 RX ORDER — CLINDAMYCIN IN PERCENT DEXTROSE 6 MG/ML
300 INJECTION, SOLUTION INTRAVENOUS EVERY 8 HOURS
Status: DISCONTINUED | OUTPATIENT
Start: 2022-02-05 | End: 2022-02-05

## 2022-02-05 RX ORDER — FLUCONAZOLE 100 MG/1
100 TABLET ORAL DAILY
Status: DISCONTINUED | OUTPATIENT
Start: 2022-02-05 | End: 2022-02-07 | Stop reason: HOSPADM

## 2022-02-05 RX ORDER — ALBUTEROL SULFATE 90 UG/1
2 AEROSOL, METERED RESPIRATORY (INHALATION) EVERY 6 HOURS PRN
Status: DISCONTINUED | OUTPATIENT
Start: 2022-02-05 | End: 2022-02-07 | Stop reason: HOSPADM

## 2022-02-05 RX ORDER — CLINDAMYCIN PHOSPHATE 600 MG/50ML
600 INJECTION, SOLUTION INTRAVENOUS EVERY 8 HOURS
Status: DISCONTINUED | OUTPATIENT
Start: 2022-02-05 | End: 2022-02-07 | Stop reason: HOSPADM

## 2022-02-05 RX ORDER — PREDNISONE 20 MG/1
40 TABLET ORAL DAILY
Status: DISCONTINUED | OUTPATIENT
Start: 2022-02-05 | End: 2022-02-07 | Stop reason: HOSPADM

## 2022-02-05 RX ADMIN — ATORVASTATIN CALCIUM 20 MG: 20 TABLET, FILM COATED ORAL at 19:37

## 2022-02-05 RX ADMIN — NICOTINE 1 PATCH: 21 PATCH, EXTENDED RELEASE TRANSDERMAL at 04:42

## 2022-02-05 RX ADMIN — FLUCONAZOLE 100 MG: 100 TABLET ORAL at 10:31

## 2022-02-05 RX ADMIN — CLINDAMYCIN IN 5 PERCENT DEXTROSE 600 MG: 12 INJECTION, SOLUTION INTRAVENOUS at 09:16

## 2022-02-05 RX ADMIN — LEVOTHYROXINE SODIUM 112 MCG: 0.11 TABLET ORAL at 09:15

## 2022-02-05 RX ADMIN — OXYCODONE HYDROCHLORIDE 5 MG: 5 TABLET ORAL at 22:13

## 2022-02-05 RX ADMIN — ACETAMINOPHEN 325 MG: 325 TABLET, FILM COATED ORAL at 06:18

## 2022-02-05 RX ADMIN — HEPARIN SODIUM 5000 UNITS: 5000 INJECTION, SOLUTION INTRAVENOUS; SUBCUTANEOUS at 09:15

## 2022-02-05 RX ADMIN — PREDNISONE 40 MG: 20 TABLET ORAL at 09:15

## 2022-02-05 RX ADMIN — PAROXETINE HYDROCHLORIDE 10 MG: 10 TABLET, FILM COATED ORAL at 09:15

## 2022-02-05 RX ADMIN — ACETAMINOPHEN 325 MG: 325 TABLET, FILM COATED ORAL at 01:56

## 2022-02-05 RX ADMIN — ACETAMINOPHEN 325 MG: 325 TABLET, FILM COATED ORAL at 09:28

## 2022-02-05 RX ADMIN — ATENOLOL 25 MG: 25 TABLET ORAL at 09:15

## 2022-02-05 RX ADMIN — HEPARIN SODIUM 5000 UNITS: 5000 INJECTION, SOLUTION INTRAVENOUS; SUBCUTANEOUS at 15:43

## 2022-02-05 RX ADMIN — OXYCODONE HYDROCHLORIDE 5 MG: 5 TABLET ORAL at 15:43

## 2022-02-05 RX ADMIN — CLINDAMYCIN IN 5 PERCENT DEXTROSE 600 MG: 12 INJECTION, SOLUTION INTRAVENOUS at 17:47

## 2022-02-05 RX ADMIN — ACETAMINOPHEN 325 MG: 325 TABLET, FILM COATED ORAL at 22:13

## 2022-02-05 RX ADMIN — ACETAMINOPHEN 325 MG: 325 TABLET, FILM COATED ORAL at 15:43

## 2022-02-05 RX ADMIN — OXYCODONE HYDROCHLORIDE 5 MG: 5 TABLET ORAL at 10:31

## 2022-02-05 ASSESSMENT — ACTIVITIES OF DAILY LIVING (ADL)
ADLS_ACUITY_SCORE: 8
ADLS_ACUITY_SCORE: 10
ADLS_ACUITY_SCORE: 8
ADLS_ACUITY_SCORE: 10
ADLS_ACUITY_SCORE: 8
ADLS_ACUITY_SCORE: 10
ADLS_ACUITY_SCORE: 8
ADLS_ACUITY_SCORE: 10
ADLS_ACUITY_SCORE: 8
ADLS_ACUITY_SCORE: 10
ADLS_ACUITY_SCORE: 8
ADLS_ACUITY_SCORE: 10
ADLS_ACUITY_SCORE: 10
ADLS_ACUITY_SCORE: 8
ADLS_ACUITY_SCORE: 10

## 2022-02-05 NOTE — PLAN OF CARE
[id number]  Status: Pt admitted 2/4 s/p I&D of left mandicular abscess.   PMH of COPD, HTN, tobacco use, recent L mandibular abscess s/p I&D x2.  Vitals: VSS on 2L NC, on continuous pulse ox. Capno taken off overnight.  Neuros: A&Ox4. Intact.  IV: PIV TKO in between antibiotics.  Labs/Electrolytes: Sodium levels, continue to monitor.  Resp/trach: on 2L, LS clear.  Diet: Dental/mechanical soft, good PO  Bowel status: BS+, no BM this shift. LBM 2/3, per patient.  : Voiding spontaneously  Skin: Penrose to neck covered with dressing, dressing changed as needed.  Pain: c/o pain to left face at incisional site managed with prn tylenol  Activity: SBA/independent, steady on feet  Social: Patient requesting cares clustered to promote sleep. Sleeping in between cares.  Plan: Manage pain. Monitor drainage and change dressing as needed. IV abx. Continue to monitor and follow POC.

## 2022-02-05 NOTE — PROGRESS NOTES
[facility]    Medicine Progress Note - Hospitalist Service, GOLD TEAM 7    Date of Admission:  2/4/2022    Assessment & Plan   Lara Melendez is a 56 year old female admitted on 2/4/2022. She has a history of history of tobacco use, thyroid disease, hypertension, and recent left aubrey-mandibular abscess s/p cutaneous I&D x 2 (first on 1/10/202) and is admitted for trismus and shortness of breath as well as ongoing swelling from abscess.     Submandibular abscess   Prior culture growth from I&D 1/10 w/ Fusobacterium, Strep intermedius, anaerobic diphtheroids, Prevotella; completed course of clindamycin x 10 days after I&D. Also w/ Candida growth on 1/4 from mandible swab so completed course of fluconazole. CT 2/3/22 w/ new left-sided abscess site.   Blood culture (x 2 ) from 2/3 no growth to date  - Unasyn was started in OR given controlled situation however led hives which resolved after Benadryl  - Tylenol PRN, On oxycodone    - OMFS and ID following   - 2/4/22> OR > I&D of left lateral pharyngeal space infection with placement of drain and debridement of left mandible and Cx from abscess growing gpc strep intermidius   - ID consult requested given recurrent abscess and allergy to PCNs. ID recommended prolonged course with Clindamycin (dose inc to 600 per pharmacy) and Fluconazole 100 mg QD for 6 weeks, however OMFS suggesting to re-consider antibiotics due extensive course of clindamycin for around 23 days. Will appreciate Infectious disease light on it.          Possible COPD exacerbation   No history of COPD diagnosis, however seems likely given tobacco use history   Would benefit from outpatient evaluation for COPD, initiating long-acting therapy like salmeterol or tiotropium  - Switched to PO prednisone 40 mg daily to complete 5 days course  - Albuterol Q4H PRN wheezing; consider discharging w/ short-acting bronchodilator   - Titrate O2 to maintain sat >90% (on 2L  currently)            Diet: Mechanical/Dental Soft DietNPO  DVT Prophylaxis: Heparin SQ  Hines Catheter: Not present  Central Lines: None  Cardiac Monitoring: None  Code Status: Full Code Full    Disposition Plan   Expected Discharge:  TBD   Anticipated discharge location:  Awaiting care coordination huddle  Delays:     TBD        The patient's care was discussed with the Patient.    LES ROWAN MD  Hospitalist Service, GOLD TEAM [address] [place] [address]  Securely message with the Vocera Web Console (learn more here)  Text page via [facility] Paging/Directory   Please see signed in provider for up to date coverage information      Clinically Significant Risk Factors Present on Admission                  ______________________________________________________________________    Interval History   Reports pain at incison around 5/10 and would like to try something stronger then tylenol. She has been tolerating diet well. Denies any fever, chills.        Data reviewed today: I reviewed all medications, new labs and imaging results over the last 24 hours. I personally reviewed no images or EKG's today.    Physical Exam   Vital Signs: Temp: 97.9  F (36.6  C) Temp src: Oral BP: 124/78 Pulse: 80   Resp: 17 SpO2: 100 % O2 Device: None (Room air) Oxygen Delivery: 2 LPM  Weight: 130 lbs 0 oz  General Appearance: AO x3. Bulky dressing on left mandible.   Respiratory: CTA bilaterally, no crackles. Scant wheezes present  Cardiovascular: S1 and S2 normal  GI: no murmur,   Skin: normal turgor  Other: ENT: bulky dressing, slightly soaked present on left side of neck.     Data   Recent Labs   Lab 02/05/22  0717 02/04/22  0915 02/03/22  2050   WBC 17.4*  --  6.6   HGB 11.2*  --  12.4   *  --  102*     --  286     --  131*   POTASSIUM 4.2  --  3.4   CHLORIDE 105  --  100   CO2 26  --  26   BUN 10  --  7   CR 0.51*  --  0.50*   ANIONGAP 4  --  5   EDIN 9.2  --  9.0   * 139*  101*     No results found for this or any previous visit (from the past 24 hour(s)).  Medications       atenolol  25 mg Oral Daily     atorvastatin  20 mg Oral Daily     clindamycin  600 mg Intravenous Q8H     fluconazole  100 mg Oral Daily     heparin ANTICOAGULANT  5,000 Units Subcutaneous Q8H     levothyroxine  112 mcg Oral Daily     nicotine  1 patch Transdermal Daily     nicotine   Transdermal Q8H     PARoxetine  10 mg Oral Daily     predniSONE  40 mg Oral Daily     sodium chloride (PF)  3 mL Intracatheter Q8H

## 2022-02-05 NOTE — PLAN OF CARE
Shift 0700 -1900     Status: Pt s/p I&D of left pharyngeal space infection and debridement of left mandible on 02/04. Pt was admitted with trismus and shortness of breath with ongoing swelling from abscess  PMHx: HTN, thyroid disease, tobacco use, recent L mandibular abscess s/p I&D x2.  Vitals: VSS with 1-2L O2 via NC on continuous pulse ox.  Neuros: A&O x4. Tenderness to Left face postop. Unable to open mouth fully pre and postop.  IV: L PIV SL between IV abx  Labs/Electrolytes: WDL  Resp/trach: Pt reports she feels short of breath without O2 especially with activity, pt's O2 sats overall have been good above 92% when resting, so keeping O2 on for comfort and will continue to wean and monitor  Diet: Dental/mechanical soft, better PO intake in the evening, pt able to eat better when she is less pain around her jaw  Bowel status: Last BM 02/04 per pt. BS+  : VDSP  Skin: Penrose to neck, changed elastic dressing (fluffs) around the neck and replaced the gauze, minimal serosang drainage  Pain: c/o pain to left face especially with PO intake, managed with prn tylenol and prn 5mg oxycodone  Activity: Up ad donald in room  Social:  Luis visited in AM  Plan: Manage pain. Pending cultures from 02/04. Pending definitive antibiotic plan, currently on IV clindamycin. Monitor drainage and change dressing as needed. IV abx.

## 2022-02-05 NOTE — PROGRESS NOTES
ORAL & MAXILLOFACIAL SURGERY   PROGRESS NOTE  Lara Melendez,  MRN: [id number],  : 1965           ASSESSMENT:  Lara Melendez is a 56 year old female s/p I&D of submandibular and deep space infections in OR on 1/10/2022, now s/p I&D of left lateral pharyngeal space infection and debridement of left mandible in OR on 2022. Patient received Unasyn 3g intraoperatively and subsequently developed rash with IV Benadryl relief. She is currently on IV clindamycin 300mg TID and fluconazole 100mg daily per ID.    Addendum: debridement performed in OR was of superficial mandibular bone in the third molar region.  This was sent for permanent pathology    TIMELINE:  21: Extraction of tooth #17   21: Intraoral icision and drainage with placement of intraoral drain at outside Oral Surgery Office, Started on Keflex, Flagyl, Fluconazole  22: Drain Removed  1/10/22: Developed Submandibular, Sublingual, Masseteric, and Lateral Pharyngeal space infection. Extraoral Incision and drainage with placement of two drains in the OR at [facility]. Started on 900 Clinda IV, and given 300mg TID Clinda and fluconazole PO for 10 days. Ended Keflex and Flagyl Regimen.   Cultures: Strep Intermedius, susceptible to pcn and vanc  Fusobacterium nucleatum, Diphtheroids, Prevotella  Fungal: No Growth  22: Drains Removed  22: Abx completed.  22: Follow up in OMFS clinic with developed Submandibular and masseteric space infection. Started on Clindamycin 300 mg TID for 7 days  22: Extraoral Incision and drainage of left submandibular and masseteric spaces in clinic with placement of drains; Continued on Clindamycin  22: Completed Abx  2/3/22: Presented to ED with pain and trismus, found to have left lateral pharyngeal space infection, given 900mg Clinda IV  22: Extraoral Incision and drainage with bone biopsy and debridement in the OR, bone was not indicative of osteomyelitis, but did show possible sclerotic  change. Given 3g Unasyn intra op, developed rash approximately 1 hour s/p procedure. Switched to IV Clindamycin and Fluconazole per ID recommendations.      MICROBIOLOGY  Cultures: Strep Intermedius  Fungal: pending  Anaerobe: pending  Pathology: pending    RECOMMENDATIONS:  Appreciate primary team cares and ID recs  Up with assist  No smoking  Continue soft mechanical diet, advance as tolerated  HOB elevated, ice to face intermittent  Fluffs with Neuro netting to neck incision with drains   Continue to change fluffs when saturated by nursing.  Currently on clindamycin 300mg TID for osteomyelitis coverage and fluconazole 100mg daily for potential candida coverage for a 6-week course per ID recs   Strongly suggest to reconsider antibiotic regimen due to patient's recent extensive clindamycin course and concern for adequate coverage.  Chlorhexidine rinses BID; oral cares and warm salt water rinses s/p eating  Pain management per primary team   Can consider adding another PO such as Ibuprofen 600mg q6hrs and/or hydrocodone-acetaminophen 5-325. Patient endorses pain is inadequately controlled at this time with only acetaminophen PRN, and does not wish to take IV dilaudid.  Disposition: Pending definitive antibiotic plan      Discussed with senior resident Dr. Lyn Cordero who discussed with staff Dr. Ortez. OMS will continue to follow while inpatient.    Melly Johnson DMD  Oral & Maxillofacial Surgery, PGY-1      Please contact the OMFS resident on-call with questions or concerns.  ____________________________________      SUBJECTIVE:  Nursing notes reviewed. VSS. NAEO. Patient is currently on 2L NC. Has voided, tolerating PO with good intake and on soft mechanical diet. Patient states pain is inadequately controlled with acetaminophen PRN. Blood cultures from 2/3 with NGTD. Awaiting aerobic, anaerobic, and fungal/yeast cultures from 2/4. Gram stain from 2/4 growing gram positive cocci.     Patient states she has no  acute concerns at this time.    PHYSICAL EXAM:   GEN: NAD, lying comfortably in bed, pleasant, conversant  HEAD: NC  EYES: PERRL, EOMI, visual acuity intact  EARS: Atraumatic, hearing at conversational levels  NOSE: No active epistaxis  MAXILLOFACIAL: NC, EOMI, PERRL, transcutaneous incision with extraoral penrose drain in place, mild serosanguineous discharge noted, TTP. Moderate lower 1/3 left facial edema, no erythema, mildly indurated. Left vestibular incision c/h/i, no dehiscence, penrose drain held in place with silk sutures. JOSE ARMANDO ~15mm, uvula midline, no dyspnea or dysphagia.  CV: RRR  PULM: Breathing comfortably on room air  GI: Soft, NT  NEURO: AAOx4    LABS:   CBC RESULTS: Recent Labs   Lab Test 02/03/22 2050   WBC 6.6   RBC 3.63*   HGB 12.4   HCT 37.1   *   MCH 34.2*   MCHC 33.4   RDW 12.5         Last Comprehensive Metabolic Panel:  Sodium   Date Value Ref Range Status   02/03/2022 131 (L) 133 - 144 mmol/L Final   02/02/2006 133 133 - 144 mmol/L Final     Potassium   Date Value Ref Range Status   02/03/2022 3.4 3.4 - 5.3 mmol/L Final   02/02/2006 3.4 3.4 - 5.3 mmol/L Final     Chloride   Date Value Ref Range Status   02/03/2022 100 94 - 109 mmol/L Final   02/02/2006 109 94 - 109 mmol/L Final     Carbon Dioxide   Date Value Ref Range Status   02/02/2006 17 (L) 20 - 32 mmol/L Final     Carbon Dioxide (CO2)   Date Value Ref Range Status   02/03/2022 26 20 - 32 mmol/L Final     Anion Gap   Date Value Ref Range Status   02/03/2022 5 3 - 14 mmol/L Final   02/02/2006 7 6 - 17 mmol/L Final     Glucose   Date Value Ref Range Status   02/03/2022 101 (H) 70 - 99 mg/dL Final   02/02/2006 90 60 - 110 mg/dL Final     GLUCOSE BY METER POCT   Date Value Ref Range Status   02/04/2022 139 (H) 70 - 99 mg/dL Final     Urea Nitrogen   Date Value Ref Range Status   02/03/2022 7 7 - 30 mg/dL Final   02/02/2006 <2 (L) 5 - 24 mg/dL Final     Creatinine   Date Value Ref Range Status   02/03/2022 0.50 (L) 0.52 - 1.04  mg/dL Final   02/02/2006 0.80 0.60 - 1.30 mg/dL Final     GFR Estimate   Date Value Ref Range Status   02/03/2022 >90 >60 mL/min/1.73m2 Final     Comment:     Effective December 21, 2021 eGFRcr in adults is calculated using the 2021 CKD-EPI creatinine equation which includes age and gender (Wally alclaa al., NEJ, DOI: 10.1056/IPRUwy2906403)   02/02/2006 84 >60 mL/min/1.7m2 Final     Calcium   Date Value Ref Range Status   02/03/2022 9.0 8.5 - 10.1 mg/dL Final   02/02/2006 7.8 (L) 8.5 - 10.4 mg/dL Final        RADIOLOGY:  No new imaging obtained.

## 2022-02-06 LAB — BACTERIA ABSC ANAEROBE+AEROBE CULT: ABNORMAL

## 2022-02-06 PROCEDURE — 250N000013 HC RX MED GY IP 250 OP 250 PS 637: Performed by: STUDENT IN AN ORGANIZED HEALTH CARE EDUCATION/TRAINING PROGRAM

## 2022-02-06 PROCEDURE — 250N000013 HC RX MED GY IP 250 OP 250 PS 637: Performed by: PHYSICIAN ASSISTANT

## 2022-02-06 PROCEDURE — 250N000013 HC RX MED GY IP 250 OP 250 PS 637: Performed by: INTERNAL MEDICINE

## 2022-02-06 PROCEDURE — 999N000248 HC STATISTIC IV INSERT WITH US BY RN

## 2022-02-06 PROCEDURE — 250N000011 HC RX IP 250 OP 636: Performed by: INTERNAL MEDICINE

## 2022-02-06 PROCEDURE — 99232 SBSQ HOSP IP/OBS MODERATE 35: CPT | Performed by: INTERNAL MEDICINE

## 2022-02-06 PROCEDURE — 120N000002 HC R&B MED SURG/OB UMMC

## 2022-02-06 PROCEDURE — 250N000012 HC RX MED GY IP 250 OP 636 PS 637: Performed by: INTERNAL MEDICINE

## 2022-02-06 RX ADMIN — ATENOLOL 25 MG: 25 TABLET ORAL at 09:28

## 2022-02-06 RX ADMIN — HEPARIN SODIUM 5000 UNITS: 5000 INJECTION, SOLUTION INTRAVENOUS; SUBCUTANEOUS at 01:05

## 2022-02-06 RX ADMIN — LEVOTHYROXINE SODIUM 112 MCG: 0.11 TABLET ORAL at 09:28

## 2022-02-06 RX ADMIN — ALBUTEROL SULFATE 2 PUFF: 90 AEROSOL, METERED RESPIRATORY (INHALATION) at 17:04

## 2022-02-06 RX ADMIN — CLINDAMYCIN IN 5 PERCENT DEXTROSE 600 MG: 12 INJECTION, SOLUTION INTRAVENOUS at 09:00

## 2022-02-06 RX ADMIN — ACETAMINOPHEN 325 MG: 325 TABLET, FILM COATED ORAL at 18:41

## 2022-02-06 RX ADMIN — CLINDAMYCIN IN 5 PERCENT DEXTROSE 600 MG: 12 INJECTION, SOLUTION INTRAVENOUS at 01:05

## 2022-02-06 RX ADMIN — ACETAMINOPHEN 325 MG: 325 TABLET, FILM COATED ORAL at 09:28

## 2022-02-06 RX ADMIN — PAROXETINE HYDROCHLORIDE 10 MG: 10 TABLET, FILM COATED ORAL at 09:28

## 2022-02-06 RX ADMIN — ALBUTEROL SULFATE 2 PUFF: 90 AEROSOL, METERED RESPIRATORY (INHALATION) at 10:16

## 2022-02-06 RX ADMIN — HEPARIN SODIUM 5000 UNITS: 5000 INJECTION, SOLUTION INTRAVENOUS; SUBCUTANEOUS at 17:04

## 2022-02-06 RX ADMIN — CLINDAMYCIN IN 5 PERCENT DEXTROSE 600 MG: 12 INJECTION, SOLUTION INTRAVENOUS at 17:04

## 2022-02-06 RX ADMIN — FLUCONAZOLE 100 MG: 100 TABLET ORAL at 09:28

## 2022-02-06 RX ADMIN — ALBUTEROL SULFATE 2 PUFF: 90 AEROSOL, METERED RESPIRATORY (INHALATION) at 22:58

## 2022-02-06 RX ADMIN — OXYCODONE HYDROCHLORIDE 5 MG: 5 TABLET ORAL at 05:25

## 2022-02-06 RX ADMIN — NICOTINE 1 PATCH: 21 PATCH, EXTENDED RELEASE TRANSDERMAL at 04:00

## 2022-02-06 RX ADMIN — ACETAMINOPHEN 325 MG: 325 TABLET, FILM COATED ORAL at 05:25

## 2022-02-06 RX ADMIN — OXYCODONE HYDROCHLORIDE 5 MG: 5 TABLET ORAL at 20:03

## 2022-02-06 RX ADMIN — HEPARIN SODIUM 5000 UNITS: 5000 INJECTION, SOLUTION INTRAVENOUS; SUBCUTANEOUS at 09:28

## 2022-02-06 RX ADMIN — OXYCODONE HYDROCHLORIDE 5 MG: 5 TABLET ORAL at 09:28

## 2022-02-06 RX ADMIN — ATORVASTATIN CALCIUM 20 MG: 20 TABLET, FILM COATED ORAL at 20:03

## 2022-02-06 RX ADMIN — PREDNISONE 40 MG: 20 TABLET ORAL at 09:28

## 2022-02-06 RX ADMIN — ACETAMINOPHEN 325 MG: 325 TABLET, FILM COATED ORAL at 13:50

## 2022-02-06 ASSESSMENT — ACTIVITIES OF DAILY LIVING (ADL)
ADLS_ACUITY_SCORE: 8

## 2022-02-06 NOTE — PROGRESS NOTES
ORAL & MAXILLOFACIAL SURGERY   PROGRESS NOTE  Lara Melendez,  MRN: [id number],  : 1965           ASSESSMENT:  Lara Melendez is a 56 year old female s/p I&D of submandibular and deep space infections in OR on 1/10/2022, now s/p I&D of left lateral pharyngeal space infection and debridement of left mandible in OR on 2022. Patient received Unasyn 3g intraoperatively and subsequently developed rash with IV Benadryl relief. She is currently on IV clindamycin 600mg TID (dose increase from 300 per pharmacy) and fluconazole 100mg daily per ID.    Addendum: debridement performed in OR was of superficial mandibular bone in the third molar region.  This was sent for permanent pathology    TIMELINE:  21: Extraction of tooth #17   21: Intraoral icision and drainage with placement of intraoral drain at outside Oral Surgery Office, Started on Keflex, Flagyl, Fluconazole  22: Drain Removed  1/10/22: Developed Submandibular, Sublingual, Masseteric, and Lateral Pharyngeal space infection. Extraoral Incision and drainage with placement of two drains in the OR at [facility]. Started on 900 Clinda IV, and given 300mg TID Clinda and fluconazole PO for 10 days. Ended Keflex and Flagyl Regimen.   Cultures: Strep Intermedius, susceptible to pcn and vanc  Fusobacterium nucleatum, Diphtheroids, Prevotella  Fungal: No Growth  22: Drains Removed  22: Abx completed.  22: Follow up in OMFS clinic with developed Submandibular and masseteric space infection. Started on Clindamycin 300 mg TID for 7 days  22: Extraoral Incision and drainage of left submandibular and masseteric spaces in clinic with placement of drains; Continued on Clindamycin  22: Completed Abx  2/3/22: Presented to ED with pain and trismus, found to have left lateral pharyngeal space infection, given 900mg Clinda IV  22: Extraoral Incision and drainage with bone biopsy and debridement in the OR, bone was not indicative of  osteomyelitis, but did show possible sclerotic change. Given 3g Unasyn intra op, developed rash approximately 1 hour s/p procedure. Switched to IV Clindamycin and Fluconazole per ID recommendations.      MICROBIOLOGY  Cultures: Strep Intermedius  Fungal: pending  Anaerobe: pending  Pathology: pending    RECOMMENDATIONS:  Appreciate primary team cares and ID recs  Up with assist; encourage walking with portable O2  No smoking  Continue soft mechanical diet, advance as tolerated  HOB elevated  Warm compresses to face intermittently   Fluffs with Neuro netting to neck incision with drains   Continue to change fluffs when saturated by nursing.  Can consider incentive spirometry   Currently on clindamycin 600mg TID for osteomyelitis coverage and fluconazole 100mg daily for potential candida coverage for a 6-week course per ID recs   Strongly suggest to reconsider antibiotic regimen due to patient's recent extensive clindamycin course and concern for adequate coverage.  Chlorhexidine rinses BID; oral cares and warm salt water rinses s/p eating  Pain management per primary team   Okay for NSAIDs   Disposition: Pending definitive antibiotic plan      Discussed with senior resident Dr. Lyn Cordero who discussed with staff Dr. Ortez. OMS will continue to follow while inpatient.    Melly Johnson DMD  Oral & Maxillofacial Surgery, PGY-1      Please contact the OMFS resident on-call with questions or concerns.  ____________________________________      SUBJECTIVE:  Nursing notes reviewed. VSS. NASHARANO. Patient is currently on 2L NC, states she gets SOB when up ad donald. Albuterol PRN ordered. Has voided, tolerating PO with good intake and on soft mechanical diet. Patient states pain is better controlled with acetaminophen and oxycodone PRN but still feels not well managed with pain radiating to ears. Patient would like to walk around today and shower. States she does not feel comfortable leaving hospital until she can walk  around without being short of breath.      PHYSICAL EXAM:   GEN: NAD, lying comfortably in bed, pleasant, conversant  HEAD: NC  EYES: PERRL, EOMI, visual acuity intact  EARS: Atraumatic, hearing at conversational levels  NOSE: No active epistaxis  MAXILLOFACIAL: NC, EOMI, PERRL, transcutaneous incision with extraoral penrose drain in place, mild serosanguineous discharge noted, TTP. Moderate lower 1/3 left facial edema, no erythema, mildly indurated. Left vestibular incision c/h/i, no dehiscence, penrose drain held in place with silk sutures. JOSE ARMANDO ~15mm, uvula midline, no dyspnea or dysphagia.  CV: RRR  PULM: Breathing comfortably on room air  GI: Soft, NT  NEURO: AAOx4    LABS:   CBC RESULTS: Recent Labs   Lab Test 02/03/22 2050   WBC 6.6   RBC 3.63*   HGB 12.4   HCT 37.1   *   MCH 34.2*   MCHC 33.4   RDW 12.5         Last Comprehensive Metabolic Panel:  Sodium   Date Value Ref Range Status   02/05/2022 135 133 - 144 mmol/L Final   02/02/2006 133 133 - 144 mmol/L Final     Potassium   Date Value Ref Range Status   02/05/2022 4.2 3.4 - 5.3 mmol/L Final   02/02/2006 3.4 3.4 - 5.3 mmol/L Final     Chloride   Date Value Ref Range Status   02/05/2022 105 94 - 109 mmol/L Final   02/02/2006 109 94 - 109 mmol/L Final     Carbon Dioxide   Date Value Ref Range Status   02/02/2006 17 (L) 20 - 32 mmol/L Final     Carbon Dioxide (CO2)   Date Value Ref Range Status   02/05/2022 26 20 - 32 mmol/L Final     Anion Gap   Date Value Ref Range Status   02/05/2022 4 3 - 14 mmol/L Final   02/02/2006 7 6 - 17 mmol/L Final     Glucose   Date Value Ref Range Status   02/05/2022 120 (H) 70 - 99 mg/dL Final   02/02/2006 90 60 - 110 mg/dL Final     Urea Nitrogen   Date Value Ref Range Status   02/05/2022 10 7 - 30 mg/dL Final   02/02/2006 <2 (L) 5 - 24 mg/dL Final     Creatinine   Date Value Ref Range Status   02/05/2022 0.51 (L) 0.52 - 1.04 mg/dL Final   02/02/2006 0.80 0.60 - 1.30 mg/dL Final     GFR Estimate   Date Value Ref  Range Status   02/05/2022 >90 >60 mL/min/1.73m2 Final     Comment:     Effective December 21, 2021 eGFRcr in adults is calculated using the 2021 CKD-EPI creatinine equation which includes age and gender (Wally et al., NEJM, DOI: 10.1056/ZKTWwx2243698)   02/02/2006 84 >60 mL/min/1.7m2 Final     Calcium   Date Value Ref Range Status   02/05/2022 9.2 8.5 - 10.1 mg/dL Final   02/02/2006 7.8 (L) 8.5 - 10.4 mg/dL Final        RADIOLOGY:  No new imaging obtained.

## 2022-02-06 NOTE — PROGRESS NOTES
[facility]    Medicine Progress Note - Hospitalist Service, GOLD TEAM 7    Date of Admission:  2/4/2022    Assessment & Plan   Lara Melendez is a 56 year old female admitted on 2/4/2022. She has a history of history of tobacco use, thyroid disease, hypertension, and recent left aubrey-mandibular abscess s/p cutaneous I&D x 2 (first on 1/10/202) and is admitted for trismus and shortness of breath as well as ongoing swelling from abscess.     Submandibular abscess Post tooth extraction on 12/22/21  S/p I&D on 12/28/21 and received Keflex, Flagyl and Fluconazole  S/p Another I&D 1/10/22 for developing abscess and was given 10 days of Clindamycin and Fluconazole for Cx growing Fusobacterium, Strep intermedius, anaerobic diphtheroids, Prevotella; abd Candida growth   -CT 2/3/22 w/ new left-sided abscess site.    -2/4/22> OR > I&D of left lateral pharyngeal space infection with placement of drain and debridement of left mandible and Cx from abscess growing gpc strep intermidius    -Blood culture (x 2 ) from 2/3 no growth to date   - Unasyn given in OR led hives which resolved after Benadryl   - OMFS and ID following   - PCN allergy, ID recommended prolonged course with Clindamycin (dose inc to 600 per pharmacy) and Fluconazole 100 mg QD for 6 weeks, however OMFS suggesting to re-consider antibiotics due extensive course of clindamycin for around 23 days. Will appreciate Infectious disease input.      Possible COPD exacerbation   No history of COPD diagnosis, however seems likely given tobacco use history   Would benefit from outpatient evaluation for COPD, initiating long-acting therapy like salmeterol or tiotropium  - Switched to PO prednisone 40 mg daily to complete 5 days course  - Albuterol Q4H PRN wheezing; consider discharging w/ short-acting bronchodilator   - will do home O2 evaluation            Diet: Mechanical/Dental Soft DietNPO  DVT Prophylaxis: Heparin SQ  Hines  Catheter: Not present  Central Lines: None  Cardiac Monitoring: None  Code Status: Full Code Full    Disposition Plan   Expected Discharge: 02/07/2022 TBD   Anticipated discharge location:  Awaiting care coordination huddle  Delays:     TBD        The patient's care was discussed with the Patient.    LES ROWAN MD  Hospitalist Service, GOLD TEAM 7  [facility]  Securely message with the Vocera Web Console (learn more here)  Text page via [facility] Paging/Directory   Please see signed in provider for up to date coverage information      Clinically Significant Risk Factors Present on Admission                  ______________________________________________________________________    Interval History   Patient reports feeling toungue is swollen. Examination does not reveal any evdence of tounge swelling. Denies any dyspnea or choking feeling. Will continue to monitor closely. Pain in jaw and ears, difficulty opening mouth, pain get better with oxycodone.         Data reviewed today: I reviewed all medications, new labs and imaging results over the last 24 hours. I personally reviewed no images or EKG's today.    Physical Exam   Vital Signs: Temp: 97.4  F (36.3  C) Temp src: Axillary BP: 121/74 Pulse: 82   Resp: 16 SpO2: 97 % O2 Device: None (Room air) Oxygen Delivery: 1 LPM  Weight: 130 lbs 0 oz  General Appearance: AO x3. Bulky dressing on left mandible.   Respiratory: CTA bilaterally, wheezing present bilaterally  Cardiovascular: S1 and S2 normal  GI: no murmur,   Skin: normal turgor, no rahses  ENT: bulky dressing, slightly soaked present on left side of neck.     Data   Recent Labs   Lab 02/05/22  0717 02/04/22  0915 02/03/22 2050   WBC 17.4*  --  6.6   HGB 11.2*  --  12.4   *  --  102*     --  286     --  131*   POTASSIUM 4.2  --  3.4   CHLORIDE 105  --  100   CO2 26  --  26   BUN 10  --  7   CR 0.51*  --  0.50*   ANIONGAP 4  --  5   EDIN 9.2  --   9.0   * 139* 101*     No results found for this or any previous visit (from the past 24 hour(s)).  Medications       atenolol  25 mg Oral Daily     atorvastatin  20 mg Oral Daily     clindamycin  600 mg Intravenous Q8H     fluconazole  100 mg Oral Daily     heparin ANTICOAGULANT  5,000 Units Subcutaneous Q8H     levothyroxine  112 mcg Oral Daily     nicotine  1 patch Transdermal Daily     nicotine   Transdermal Q8H     PARoxetine  10 mg Oral Daily     predniSONE  40 mg Oral Daily     sodium chloride (PF)  3 mL Intracatheter Q8H

## 2022-02-06 NOTE — PLAN OF CARE
Shift 0700 -1900     Status: Pt s/p I&D of left pharyngeal space infection and debridement of left mandible on 02/04. Pt was admitted with trismus and shortness of breath with ongoing swelling from abscess  PMHx: HTN, thyroid disease, tobacco use, recent L mandibular abscess s/p I&D x2.  Vitals: VSS with 2L O2 via NC on continuous pulse ox.  Neuros: A&O x4. Tenderness to Left face and postop. Unable to open mouth fully pre and postop.  IV: L PIV SL between IV abx  Labs/Electrolytes: WDL  Resp/trach: Pt reports she feels short of breath without O2 especially with activity. Home O2 need assessed. Provided prn albuterol inhaler x2  Diet: Dental/mechanical soft, PO intake better with pain management  Bowel status: Last BM 02/04 per pt. BS+  : VDSP  Skin: Penrose to left neck, scant drainage, pt complaining to constant discomfort and irritation with gauze and fluff on, provided large size fluff.   Pain: c/o pain to left face especially with PO intake, managed with prn tylenol and prn 5mg oxycodone, jaw bra with warm pack throughout the day, helpful per pt  Activity: Up ad donald in room  Social:  Luis visited   Plan: Manage pain. Currently being treated with IV clindamycin and fluconazole, ID following. Cont with current POC    New this shift: Home oxygen Assessment completed this shift, pls refer to previous note

## 2022-02-06 NOTE — PLAN OF CARE
Patient has been assessed for Home Oxygen needs. Oxygen readings:    *Pulse oximetry (SpO2) = 94% on room air at rest while awake.    *SpO2 improved to 96% on 2liters/minute at rest.    *SpO2 = 86% on room air during activity/with exercise.    *SpO2 improved to 94% on 2liters/minute during activity/with exercise.

## 2022-02-06 NOTE — PLAN OF CARE
[id number]  Status: Pt admitted 2/4 s/p I&D of left mandicular abscess.   PMH of COPD, HTN, tobacco use, recent L mandibular abscess s/p I&D x2.  Vitals: VSS on 2L NC, on continuous pulse ox. O2 for comfort.  Neuros: A&Ox4. Intact.  IV: PIV SL, new IV placed overnight.  Labs/Electrolytes: Sodium levels, continue to monitor.  Resp/trach: on 2L, LS clear. No Albuterol inhaler needed overnight.  Diet: Dental/mechanical soft, good PO  Bowel status: BS+, no BM this shift. LBM 2/3, per patient.  : Voiding spontaneously  Skin: Penrose to neck covered with dressing, dressing changed as needed.  Pain: c/o pain to left face at incisional site managed with prn tylenol and oxycodone.  Activity: SBA/independent, steady on feet  Social: Patient requesting cares clustered to promote sleep. Sleeping in between cares.  Plan: Manage pain. Pending culture from 2/4. Pending definitive antibiotic plan. Monitor drainage and change dressing as needed. Continue to monitor and follow POC.

## 2022-02-06 NOTE — PROVIDER NOTIFICATION
"Paged Gold [employer]    \"Lara Melendez [id number]: Can she get an order for albuterol inhaler? She has been taking it previously per pt. Although on 1-2L O2 with good O2 sats, she reports shortness of breath intermittently\"  "

## 2022-02-07 VITALS
HEIGHT: 65 IN | WEIGHT: 130 LBS | BODY MASS INDEX: 21.66 KG/M2 | DIASTOLIC BLOOD PRESSURE: 81 MMHG | HEART RATE: 77 BPM | RESPIRATION RATE: 16 BRPM | SYSTOLIC BLOOD PRESSURE: 141 MMHG | TEMPERATURE: 98.3 F | OXYGEN SATURATION: 93 %

## 2022-02-07 LAB
ANION GAP SERPL CALCULATED.3IONS-SCNC: 6 MMOL/L (ref 3–14)
BACTERIA ABSC ANAEROBE+AEROBE CULT: ABNORMAL
BACTERIA ABSC ANAEROBE+AEROBE CULT: NO GROWTH
BASOPHILS # BLD AUTO: 0 10E3/UL (ref 0–0.2)
BASOPHILS NFR BLD AUTO: 0 %
BUN SERPL-MCNC: 10 MG/DL (ref 7–30)
CALCIUM SERPL-MCNC: 9.1 MG/DL (ref 8.5–10.1)
CHLORIDE BLD-SCNC: 101 MMOL/L (ref 94–109)
CO2 SERPL-SCNC: 28 MMOL/L (ref 20–32)
CREAT SERPL-MCNC: 0.55 MG/DL (ref 0.52–1.04)
EOSINOPHIL # BLD AUTO: 0.2 10E3/UL (ref 0–0.7)
EOSINOPHIL NFR BLD AUTO: 3 %
ERYTHROCYTE [DISTWIDTH] IN BLOOD BY AUTOMATED COUNT: 13.1 % (ref 10–15)
GFR SERPL CREATININE-BSD FRML MDRD: >90 ML/MIN/1.73M2
GLUCOSE BLD-MCNC: 111 MG/DL (ref 70–99)
HCT VFR BLD AUTO: 35.7 % (ref 35–47)
HGB BLD-MCNC: 11.6 G/DL (ref 11.7–15.7)
IMM GRANULOCYTES # BLD: 0.1 10E3/UL
IMM GRANULOCYTES NFR BLD: 1 %
LYMPHOCYTES # BLD AUTO: 2.3 10E3/UL (ref 0.8–5.3)
LYMPHOCYTES NFR BLD AUTO: 37 %
MCH RBC QN AUTO: 34.4 PG (ref 26.5–33)
MCHC RBC AUTO-ENTMCNC: 32.5 G/DL (ref 31.5–36.5)
MCV RBC AUTO: 106 FL (ref 78–100)
MONOCYTES # BLD AUTO: 0.6 10E3/UL (ref 0–1.3)
MONOCYTES NFR BLD AUTO: 10 %
NEUTROPHILS # BLD AUTO: 3.2 10E3/UL (ref 1.6–8.3)
NEUTROPHILS NFR BLD AUTO: 49 %
NRBC # BLD AUTO: 0 10E3/UL
NRBC BLD AUTO-RTO: 0 /100
PLATELET # BLD AUTO: 255 10E3/UL (ref 150–450)
POTASSIUM BLD-SCNC: 3.5 MMOL/L (ref 3.4–5.3)
RBC # BLD AUTO: 3.37 10E6/UL (ref 3.8–5.2)
SODIUM SERPL-SCNC: 135 MMOL/L (ref 133–144)
WBC # BLD AUTO: 6.4 10E3/UL (ref 4–11)

## 2022-02-07 PROCEDURE — 85025 COMPLETE CBC W/AUTO DIFF WBC: CPT | Performed by: INTERNAL MEDICINE

## 2022-02-07 PROCEDURE — 250N000011 HC RX IP 250 OP 636: Performed by: INTERNAL MEDICINE

## 2022-02-07 PROCEDURE — 99233 SBSQ HOSP IP/OBS HIGH 50: CPT | Mod: GC | Performed by: INTERNAL MEDICINE

## 2022-02-07 PROCEDURE — 250N000013 HC RX MED GY IP 250 OP 250 PS 637: Performed by: INTERNAL MEDICINE

## 2022-02-07 PROCEDURE — 250N000012 HC RX MED GY IP 250 OP 636 PS 637: Performed by: INTERNAL MEDICINE

## 2022-02-07 PROCEDURE — 36415 COLL VENOUS BLD VENIPUNCTURE: CPT | Performed by: INTERNAL MEDICINE

## 2022-02-07 PROCEDURE — 250N000013 HC RX MED GY IP 250 OP 250 PS 637: Performed by: STUDENT IN AN ORGANIZED HEALTH CARE EDUCATION/TRAINING PROGRAM

## 2022-02-07 PROCEDURE — 99239 HOSP IP/OBS DSCHRG MGMT >30: CPT | Performed by: INTERNAL MEDICINE

## 2022-02-07 PROCEDURE — 82310 ASSAY OF CALCIUM: CPT | Performed by: INTERNAL MEDICINE

## 2022-02-07 PROCEDURE — 999N000128 HC STATISTIC PERIPHERAL IV START W/O US GUIDANCE

## 2022-02-07 RX ORDER — FLUCONAZOLE 100 MG/1
100 TABLET ORAL DAILY
Qty: 39 TABLET | Refills: 0 | Status: SHIPPED | OUTPATIENT
Start: 2022-02-07 | End: 2022-03-18

## 2022-02-07 RX ORDER — CLINDAMYCIN HCL 300 MG
300 CAPSULE ORAL 4 TIMES DAILY
Qty: 156 CAPSULE | Refills: 0 | Status: SHIPPED | OUTPATIENT
Start: 2022-02-07 | End: 2022-03-18

## 2022-02-07 RX ORDER — POTASSIUM CHLORIDE 750 MG/1
20 TABLET, EXTENDED RELEASE ORAL ONCE
Status: COMPLETED | OUTPATIENT
Start: 2022-02-07 | End: 2022-02-07

## 2022-02-07 RX ORDER — ALBUTEROL SULFATE 90 UG/1
2 AEROSOL, METERED RESPIRATORY (INHALATION) EVERY 4 HOURS PRN
Qty: 18 G | Refills: 0 | Status: ON HOLD | OUTPATIENT
Start: 2022-02-07 | End: 2022-05-17

## 2022-02-07 RX ORDER — PREDNISONE 20 MG/1
40 TABLET ORAL DAILY
Qty: 4 TABLET | Refills: 0 | Status: SHIPPED | OUTPATIENT
Start: 2022-02-08 | End: 2022-02-21

## 2022-02-07 RX ADMIN — OXYCODONE HYDROCHLORIDE 5 MG: 5 TABLET ORAL at 12:52

## 2022-02-07 RX ADMIN — OXYCODONE HYDROCHLORIDE 5 MG: 5 TABLET ORAL at 00:40

## 2022-02-07 RX ADMIN — HEPARIN SODIUM 5000 UNITS: 5000 INJECTION, SOLUTION INTRAVENOUS; SUBCUTANEOUS at 08:04

## 2022-02-07 RX ADMIN — ALBUTEROL SULFATE 2 PUFF: 90 AEROSOL, METERED RESPIRATORY (INHALATION) at 07:54

## 2022-02-07 RX ADMIN — ACETAMINOPHEN 325 MG: 325 TABLET, FILM COATED ORAL at 00:38

## 2022-02-07 RX ADMIN — PREDNISONE 40 MG: 20 TABLET ORAL at 07:58

## 2022-02-07 RX ADMIN — CLINDAMYCIN IN 5 PERCENT DEXTROSE 600 MG: 12 INJECTION, SOLUTION INTRAVENOUS at 00:38

## 2022-02-07 RX ADMIN — OXYCODONE HYDROCHLORIDE 5 MG: 5 TABLET ORAL at 07:54

## 2022-02-07 RX ADMIN — ACETAMINOPHEN 325 MG: 325 TABLET, FILM COATED ORAL at 07:54

## 2022-02-07 RX ADMIN — CLINDAMYCIN IN 5 PERCENT DEXTROSE 600 MG: 12 INJECTION, SOLUTION INTRAVENOUS at 08:02

## 2022-02-07 RX ADMIN — NICOTINE 1 PATCH: 21 PATCH, EXTENDED RELEASE TRANSDERMAL at 03:42

## 2022-02-07 RX ADMIN — FLUCONAZOLE 100 MG: 100 TABLET ORAL at 07:58

## 2022-02-07 RX ADMIN — POTASSIUM CHLORIDE 20 MEQ: 750 TABLET, EXTENDED RELEASE ORAL at 12:52

## 2022-02-07 RX ADMIN — HEPARIN SODIUM 5000 UNITS: 5000 INJECTION, SOLUTION INTRAVENOUS; SUBCUTANEOUS at 00:38

## 2022-02-07 RX ADMIN — ACETAMINOPHEN 325 MG: 325 TABLET, FILM COATED ORAL at 12:52

## 2022-02-07 RX ADMIN — ATENOLOL 25 MG: 25 TABLET ORAL at 07:58

## 2022-02-07 RX ADMIN — PAROXETINE HYDROCHLORIDE 10 MG: 10 TABLET, FILM COATED ORAL at 07:57

## 2022-02-07 RX ADMIN — LEVOTHYROXINE SODIUM 112 MCG: 0.11 TABLET ORAL at 07:57

## 2022-02-07 ASSESSMENT — ACTIVITIES OF DAILY LIVING (ADL)
ADLS_ACUITY_SCORE: 8
ADLS_ACUITY_SCORE: 7
ADLS_ACUITY_SCORE: 7
ADLS_ACUITY_SCORE: 8
ADLS_ACUITY_SCORE: 8
ADLS_ACUITY_SCORE: 7
ADLS_ACUITY_SCORE: 8
ADLS_ACUITY_SCORE: 7
ADLS_ACUITY_SCORE: 8

## 2022-02-07 NOTE — PLAN OF CARE
[id number]  Status: Pt admitted 2/4 s/p I&D of left mandicular abscess.   PMH of COPD, HTN, tobacco use, recent L mandibular abscess s/p I&D x2.  Vitals: VSS on 2L NC, on continuous pulse ox. O2 for comfort.  Neuros: A&Ox4. Intact. Tenderness to L face and postop area. Unable to open mouth fully pre and postop.  IV: PIV SL in between antibiotics.  Labs/Electrolytes: WNL  Resp/trach: on 2L, LS clear. PRN albuterol inhaler given x1.  Diet: Dental/mechanical soft, good PO  Bowel status: BS+, no BM this shift. LBM 2/4, per patient.  : Voiding spontaneously  Skin: Penrose to neck covered with dressing, dressing changed as needed.   Pain: c/o pain to left face at incisional site managed with prn tylenol and oxycodone, and heat with jaw bra.  Activity: independent, steady on feet  Social: Patient requesting cares clustered to promote sleep. Sleeping in between cares.  Plan: Manage pain. IV antibiotics. Continue to monitor and follow POC. See home oxygen assessment note.

## 2022-02-07 NOTE — PROGRESS NOTES
[facility]  General Infectious Disease Progress Note- [place] Team   Patient:  Lara Melendez, Date of birth 1965, Medical record number [id number]       Assessment and Recommendations:     Recommendations:   1. Continue Clindamycin 300mg TID x 6 weeks for osteomyelitis coverage   2. Continue Fluconazole 100 mg daily x 6 weeks      Assessment:  57 yo female with history of COPD and recent left mandibular abscess 2/2 extraction tooth #17 s/p cutaneous I&D on 1/10/22 and discharged with 10 day course of clindamycin and fluconazole, now with recurrent left mandibular abscess s/p transcutaneous I&D 22. Patient received Unasyn 3 g intraop and subsequently developed  body rash without airway compromise 2/2 PCN allergy and resolved with Benadryl 50 mg IV.      Patient currently afebrile, WBC 6.4 wnl. Receiving clindamycin 600mg IV q8h while inpatient transition to clindamycin 300mg PO on discharge. Aerobic bacterial culture shows strep intermedius with susceptibility to clindamycin. Discussed with OMFS team, recommend continuing course of clindamycin.      Problem List:  1. Left recurrent Submandibular abscess w/ allergy to Penicillin    2. COPD exacerbation with wheezing   3. Hypovolemic hyponatremia         Thank you for this consult. ID will continue to follow. Don't hesitate to call with questions.      Jassi Galindo DDS           Antimicrobials/Immunomodulatory   Clindamycin 900mg IV: -present  Unasyn 3/04  Clindamycin 300 mg TID PO: 1/10-  Fluconazole 100 mg daily: 1/10-    Keflex 500mg QID: discontinued on 22  Metronidazole 500mg QID: Discontinued on 22       Microbiologic Data:   22    Gram Stain: 2+ Gram positive cocci and 3+ WBC seen    Abscess Aerobic Bacterial Culture x2: Strep intermedius sensitive to Clindamycin   Ampicillin <=0.06 ug/mL Susceptible     Cefotaxime <=0.25 ug/mL Susceptible     Ceftriaxone <=0.25 ug/mL Susceptible      Clindamycin <=0.06 ug/mL Susceptible     Erythromycin 0.5 ug/mL Intermediate     Penicillin <=0.03 ug/mL Susceptible     Vancomycin 0.5 ug/mL Susceptible        Fungal or Yeast culture x2: NG    Anaerobic Bacterial culture x2: NG     BCx2: NG     1/10/22    Abscess Aerobic bacterial culture (from neck): Strep intermedius, Isolated in broth only, Fusobacterium nucleatum    Gram Stain: 2+ gram positive cocci, 4+ WBC seen     Anaerobic bacterial culture (from neck): Fusobacterium nucleatum, anaerobic diphtheroids, Prevotella species.      1/04/22    Swab Aerobic bacterial culture from mandible: isolated in broth only, Candida albicans            Imaging(selected):   2/03/22 CT soft tissue neck with contrast:     The previously seen large left perimandibular abscess on 01/10/2022 appears to have resolved. There is a new, smaller, collection extending cephalad from the margin of the left submandibular gland into the parapharyngeal and  spaces as detailed above compatible with abscess measuring 2.2 x 1.2 x 2.9 cm as detailed above. Adjacent soft tissue thickening and inflammation of the pterygoid musculature submandibular gland parapharyngeal space.    No additional collection.     Development of mild sclerosis and slightly permeative appearance of the left hemimandible adjacent to the left posterior mandibular molar extraction socket could represent early osteomyelitis.    Airway is widely patent.     1/10/22 CT soft tissue neck with contrast:     Left facial abscess along the left hemimandible. No significant narrowing of the airway. No lymphadenopathy.    Fibrocalcific nodules in the left lung apex with fibrotic scarring may represent sequela of prior infection; possibility of active infection cannot be completely excluded.          Labs (selected):   WBC: 6.4  See micro section above.         Interval History/Subjective:   No acute events over weekend. Denies chills, fatigue, chest pain, abdominal pain, N/V.         Review of Systems:     See above for pertinent positive. The patient did not report other symptoms.  Specifically  No chest pain, palpitations  No shortness of breath, cough  No headache vision changes or lightheadedness  No abdominal pain diarrhea or vomiting  No new rash or swelling  No new joint pain back pain or immobility         Physical Exam:   Ranges for vital signs:  Temp:  [95.8  F (35.4  C)-98.4  F (36.9  C)] 98.3  F (36.8  C)  Pulse:  [72-80] 77  Resp:  [16] 16  BP: (116-141)/(79-83) 141/81  SpO2:  [93 %-99 %] 93 %    Exam:  GENERAL:  well-developed, well-nourished, in no acute distress.   HEENT: Left transoral drain patent and intact, limited mouth opening.  Head is normocephalic, atraumatic. Oropharynx is moist without exudates or ulcers.  EYES:  Eyes have anicteric sclerae. PERRL.   NECK:  Supple. No cervical lymphadenopathy  LUNGS:  Clear to auscultation bilaterally. No wheezes or crackles.  CARDIOVASCULAR:  Regular rate and rhythm with no murmurs, gallops or rubs.  ABDOMEN:  Normal bowel sounds, soft, nontender. No hepatosplenomegaly.   EXT: Extremities warm and without edema.  SKIN:  No rashes or ulcers on visible skin  NEUROLOGIC:  Grossly nonfocal.           Laboratory Data:   I reviewed all recent new lab and imaging data, (as well as EKG, ECHO and other special testing if applicable) in Epic EMR, please see results section in chart for data.  I did not independently interpret imaging or other special testing unless recorded elsewhere in my note, but reviewed official reports  Pertinent data elsewhere recorded in my note      Signed:     Jassi Galindo DDS

## 2022-02-07 NOTE — PROGRESS NOTES
ORAL & MAXILLOFACIAL SURGERY   PROGRESS NOTE  Lara Melendez,  MRN: [id number],  : 1965           ASSESSMENT:  Lara Melendez is a 56 year old female s/p I&D of submandibular and deep space infections in OR on 1/10/2022, now s/p I&D of left lateral pharyngeal space infection and debridement of left mandible in OR on 2022. Patient received Unasyn 3g intraoperatively and subsequently developed rash with IV Benadryl relief. She is currently on IV clindamycin 600mg TID (dose increase from 300 per pharmacy) and fluconazole 100mg daily per ID.    Addendum: debridement performed in OR was of superficial mandibular bone in the third molar region.  This was sent for permanent pathology    TIMELINE:  21: Extraction of tooth #17   21: Intraoral icision and drainage with placement of intraoral drain at outside Oral Surgery Office, Started on Keflex, Flagyl, Fluconazole  22: Drain Removed  1/10/22: Developed Submandibular, Sublingual, Masseteric, and Lateral Pharyngeal space infection. Extraoral Incision and drainage with placement of two drains in the OR at [facility]. Started on 900 Clinda IV, and given 300mg TID Clinda and fluconazole PO for 10 days. Ended Keflex and Flagyl Regimen.   Cultures: Strep Intermedius, susceptible to pcn and vanc  Fusobacterium nucleatum, Diphtheroids, Prevotella  Fungal: No Growth  22: Drains Removed  22: Abx completed.  22: Follow up in OMFS clinic with developed Submandibular and masseteric space infection. Started on Clindamycin 300 mg TID for 7 days  22: Extraoral Incision and drainage of left submandibular and masseteric spaces in clinic with placement of drains; Continued on Clindamycin  22: Completed Abx  2/3/22: Presented to ED with pain and trismus, found to have left lateral pharyngeal space infection, given 900mg Clinda IV  22: Extraoral Incision and drainage with bone biopsy and debridement in the OR, bone was not indicative of  osteomyelitis, but did show possible sclerotic change. Given 3g Unasyn intra op, developed rash approximately 1 hour s/p procedure. Switched to IV Clindamycin and Fluconazole per ID recommendations.      MICROBIOLOGY  Cultures: Strep Intermedius  Fungal: pending  Anaerobe: pending  Pathology: pending    RECOMMENDATIONS:  Appreciate primary team cares and ID recs  Up with assist; encourage walking with portable O2  No smoking  Continue soft mechanical diet, advance as tolerated  HOB elevated  Warm compresses to face intermittently   Fluffs with Neuro netting to neck incision with drains   Continue to change fluffs when saturated by nursing.  Can consider incentive spirometry   Currently on clindamycin 600mg TID for osteomyelitis coverage and fluconazole 100mg daily for potential candida coverage for a 6-week course per ID recs   Strongly suggest to reconsider antibiotic regimen due to patient's recent extensive clindamycin course and concern for adequate coverage.  Chlorhexidine rinses BID; oral cares and warm salt water rinses s/p eating  Pain management per primary team   Okay for NSAIDs   Disposition: Pending definitive antibiotic plan      Discussed with senior resident Dr. Lyn Cordero who discussed with staff Dr. Ortez. OMS will continue to follow while inpatient.    Melly Johnson DMD  Oral & Maxillofacial Surgery, PGY-1      Please contact the OMFS resident on-call with questions or concerns.  ____________________________________      SUBJECTIVE:  Nursing notes reviewed. VSS. NAEO. Patient is currently on RA at rest and intermittently on 2L NC during activity/exercise. Patient reports she has been moving actively and independently without assist. States the albuterol PRN has been helping. Has voided, tolerating PO with good intake when pain is better controlled and still on soft mechanical diet. Still states that pain radiates to ears.      PHYSICAL EXAM:   GEN: NAD, lying comfortably in bed, pleasant,  conversant  HEAD: NC  EYES: PERRL, EOMI, visual acuity intact  EARS: Atraumatic, hearing at conversational levels  NOSE: No active epistaxis  MAXILLOFACIAL: NC, EOMI, PERRL, transcutaneous incision with extraoral penrose drain in place, mild serosanguineous discharge noted, TTP. Mild lower 1/3 left facial edema, no erythema, mildly indurated. Left vestibular incision c/h/i, no dehiscence, penrose drain held in place with silk sutures. JOSE ARMANDO ~15mm, uvula midline, no dyspnea or dysphagia.  CV: RRR  PULM: Breathing comfortably on room air  GI: Soft, NT  NEURO: AAOx4    LABS:   CBC RESULTS: Recent Labs   Lab Test 02/03/22 2050   WBC 6.6   RBC 3.63*   HGB 12.4   HCT 37.1   *   MCH 34.2*   MCHC 33.4   RDW 12.5         Last Comprehensive Metabolic Panel:  Sodium   Date Value Ref Range Status   02/05/2022 135 133 - 144 mmol/L Final   02/02/2006 133 133 - 144 mmol/L Final     Potassium   Date Value Ref Range Status   02/05/2022 4.2 3.4 - 5.3 mmol/L Final   02/02/2006 3.4 3.4 - 5.3 mmol/L Final     Chloride   Date Value Ref Range Status   02/05/2022 105 94 - 109 mmol/L Final   02/02/2006 109 94 - 109 mmol/L Final     Carbon Dioxide   Date Value Ref Range Status   02/02/2006 17 (L) 20 - 32 mmol/L Final     Carbon Dioxide (CO2)   Date Value Ref Range Status   02/05/2022 26 20 - 32 mmol/L Final     Anion Gap   Date Value Ref Range Status   02/05/2022 4 3 - 14 mmol/L Final   02/02/2006 7 6 - 17 mmol/L Final     Glucose   Date Value Ref Range Status   02/05/2022 120 (H) 70 - 99 mg/dL Final   02/02/2006 90 60 - 110 mg/dL Final     Urea Nitrogen   Date Value Ref Range Status   02/05/2022 10 7 - 30 mg/dL Final   02/02/2006 <2 (L) 5 - 24 mg/dL Final     Creatinine   Date Value Ref Range Status   02/05/2022 0.51 (L) 0.52 - 1.04 mg/dL Final   02/02/2006 0.80 0.60 - 1.30 mg/dL Final     GFR Estimate   Date Value Ref Range Status   02/05/2022 >90 >60 mL/min/1.73m2 Final     Comment:     Effective December 21, 2021 eGFRcr in  adults is calculated using the 2021 CKD-EPI creatinine equation which includes age and gender (Wally alcala al., NEJ, DOI: 10.1056/EISLgs6838421)   02/02/2006 84 >60 mL/min/1.7m2 Final     Calcium   Date Value Ref Range Status   02/05/2022 9.2 8.5 - 10.1 mg/dL Final   02/02/2006 7.8 (L) 8.5 - 10.4 mg/dL Final        RADIOLOGY:  No new imaging obtained.

## 2022-02-07 NOTE — DISCHARGE INSTRUCTIONS
- No lifting greater than 15 lbs, no driving while using narcotic pain medication, no strenuous exercise for 2 weeks.   - Patient can resume light active duty on post-operative day #2.  - Patient to maintain good oral hygiene with brushing of teeth 2x/day with soft toothbrush starting day after surgery.  - Patient to advance diet as tolerated.   - Please avoid smoking, spitting, drinking through straws or vigoroursly rinsing your mouth.   - Some bleeding is normal from surgical areas. We recommend firm pressure. If bleeding occurs place a moist gauze on the surgical site until active bleeding stops. It is important that the gauze is in proper position and pressure is kept on the area for 20-30 minutes to control bleeding.   -Wound dressingsshould be left in place until your follow up appointment unless stated otherwise by your doctor. Showers without direct water spray to the wound are permitted. Skin wounds may be cleaned may be cleaned with wet Q-tips three times a day and an antibiotic ointment or petroleum jelly reapplied to the wound. If you smoke, please refrain from doing so for 4 days after your surgery. If there are sharp areas in your mouth from wires or arch bars, ask you doctor about applying wax around the sharp area. The wax may be replaced as needed.   - Some swelling and bruising may occur following surgery. This will usually peak in 36-48 hours. We recommend ice pack to area on outside of face. It should stay on 10 minutes then off for a short while so the skin doesn't get cold.   - While sleeping or resting, elevate your head on 2 pillows, it will help with swelling.   - Please call [phone] to ask for oral surgery resident on call if questions or concerns.   - Follow-up appt: [facility] 7th Floor, [place] ([address]) - The patient will follow at the [facility] OMS clinic on Wednesday 2/9/2022 (S will help coordinate to schedule the patient for follow-up).

## 2022-02-07 NOTE — PLAN OF CARE
"BP (!) 141/81 (BP Location: Left arm)   Pulse 77   Temp 98.3  F (36.8  C) (Oral)   Resp 16   Ht 1.651 m (5' 5\")   Wt 59 kg (130 lb)   SpO2 93%   BMI 21.63 kg/m      Assumed cares [id number]  Neuro: A&Ox4  Pain/Nausea: denies nausea; reports left sided neck and jaw tenderness, decreased by oxy and tylenol  Cardiac: regular rate and rhythm  Resp: lung sounds clear and equal bilaterally  GI/: voids spontaneously, no BM this shift  Diet/Appetite: mechanical soft diet, good appetite  Access: PIV s/l  Drains: penrose drain to L neck, minimal drainage  Activity: up ad donald  Will continue with plan of care and notify team of any changes.?    Discharged to: home  Transportation:   Time: 1555  Prescriptions: picked up at discharge pharmacy  Belongings: returned to pt at discharge: phone, clothing, shoes, home oxygen  PIV/Access: removed at discharge  Care Plan and Education discontinued: completed  Paperwork: reviewed with patient, all questions answered      "

## 2022-02-07 NOTE — PLAN OF CARE
Oxygen Documentation:   I certify that this patient, Lara Melendez has been under my care (or a nurse practitioner or physican's assistant working with me). This is the face-to-face encounter for oxygen medical necessity.      Lara Melendez is now in a chronic stable state and continues to require supplemental oxygen. Patient has continued oxygen desaturation due to Chronic Bronchitis J41.0.    Alternative treatment(s) tried or considered and deemed clinically infective for treatment of COPD J44.9 include nebulizers, inhalers, steroids, and pulmonary toileting.  If portability is ordered, is the patient mobile within the home? yes    **Patients who qualify for home O2 coverage under the CMS guidelines require ABG tests or O2 sat readings obtained closest to, but no earlier than 2 days prior to the discharge, as evidence of the need for home oxygen therapy. Testing must be performed while patient is in the chronic stable state. See notes for O2 sats.

## 2022-02-08 ENCOUNTER — PATIENT OUTREACH (OUTPATIENT)
Dept: CARE COORDINATION | Facility: CLINIC | Age: 57
End: 2022-02-08
Payer: COMMERCIAL

## 2022-02-08 DIAGNOSIS — Z71.89 OTHER SPECIFIED COUNSELING: ICD-10-CM

## 2022-02-08 LAB
BACTERIA BLD CULT: NO GROWTH
BACTERIA BLD CULT: NO GROWTH

## 2022-02-08 NOTE — PROGRESS NOTES
Clinic Care Coordination Contact  RUST/Voicemail       Clinical Data: Care Coordinator Outreach  Outreach attempted x 1.  Left message on patient's voicemail with call back information and requested return call.  Plan: Care Coordinator will try to reach patient again in 1-2 business days.      STANLEY Elmore  [phone]  [facility]

## 2022-02-09 NOTE — PROGRESS NOTES
Clinic Care Coordination Contact  [facility]: Post-Discharge Note  SITUATION                                                      Admission:    Admission Date: 02/04/22   Reason for Admission: Left Mandibular abscess  Discharge:   Discharge Date: 02/07/22  Discharge Diagnosis: Left Mandibular abscess    BACKGROUND                                                      Per hospital discharge summary and inpatient provider notes:    Lara Melendez is a 56 year old female who presents with shortness of breath, malaise.     Recent history of submandibular abscess, drained twice. Completed a course of antibiotics (clindamycin; also fluconazole) once she was discharged from the hospital. Symptoms had been improving. In the 24 hours prior to presentation she developed significant shortness of breath, notes she couldn't walk even a few steps at home before feeling completely winded. She has some difficulty opening her mouth completely after this had initially gotten better. Minimal PO intake in last day or two due to the pain and trismus. No fevers or chills at home.     At outside ER, she was noted to have expiratory wheezing, improved w/ bronchodilators. Respiratory status otherwise stable - no stridor, no drooling, speaking in fulls sentences. CXR was without infiltrate; flu and COVID testing negative. CT neck showed recurrent abscess in new location, still on left side. She was given methylprednisolone and clindamycin and transferred to [facility] for further management.    ASSESSMENT      Enrollment  Primary Care Care Coordination Status: Not a Candidate    Discharge Assessment  How are you doing now that you are home?: I am not bad. The pain in my mouth is still there and I am using ice for it.  How are your symptoms? (Red Flag symptoms escalate to triage hotline per guidelines): Unchanged  Do you feel your condition is stable enough to be safe at home until your provider visit?: Yes  Does the patient have their  discharge instructions? : Yes  Does the patient have questions regarding their discharge instructions? : No  Were you started on any new medications or were there changes to any of your previous medications? : Yes  Does the patient have all of their medications?: Yes  Do you have questions regarding any of your medications? : No  Do you have all of your needed medical supplies or equipment (DME)?  (i.e. oxygen tank, CPAP, cane, etc.): Yes  Discharge follow-up appointment scheduled within 14 calendar days? : No (Oral surgeon clinic called patient this morning and said they had nothing avaliable today. Patient is going to call back and see if theyhave anything avaliable next week.)  Is patient agreeable to assistance with scheduling? : No                  PLAN                                                      Outpatient Plan: Follow up with primary care provider, [facility], within 7 days for hospital follow- up. No follow up labs  or test are needed.    No future appointments.      For any urgent concerns, please contact our 24 hour nurse triage line: [phone] ([phone])         STANLEY Elmore  [phone]  Connected Care Resource [facility]

## 2022-02-10 LAB
PATH REPORT.COMMENTS IMP SPEC: NORMAL
PATH REPORT.COMMENTS IMP SPEC: NORMAL
PATH REPORT.FINAL DX SPEC: NORMAL
PATH REPORT.GROSS SPEC: NORMAL
PATH REPORT.MICROSCOPIC SPEC OTHER STN: NORMAL
PATH REPORT.RELEVANT HX SPEC: NORMAL
PHOTO IMAGE: NORMAL

## 2022-02-11 LAB
BACTERIA ABSC ANAEROBE+AEROBE CULT: NORMAL
BACTERIA ABSC ANAEROBE+AEROBE CULT: NORMAL

## 2022-02-15 NOTE — DISCHARGE SUMMARY
[facility]  Hospitalist Discharge Summary      Date of Admission:  2/4/2022  Date of Discharge:  2/7/2022  4:28 PM  Discharging Provider: LES ROWAN MD  Discharge Service: Hospitalist Service, GOLD TEAM 7    Discharge Diagnoses   mandibular abscess  Acute on Chronic hypoxemic respiratory failure    Follow-ups Needed After Discharge   Follow-up Appointments     Adult [facility] Follow-up and recommended labs and tests      Follow up with primary care provider, [facility],   within 7 days for hospital follow- up.  No follow up labs or test are   needed.      Appointments on [place] and/or [facility] (with [facility] or [facility]   provider or service). Call [phone] if you haven't heard regarding   these appointments within 7 days of discharge.             Unresulted Labs Ordered in the Past 30 Days of this Admission     Date and Time Order Name Status Description    2/4/2022 10:34 AM Fungal or Yeast Culture Routine Preliminary     2/4/2022 10:32 AM Fungal or Yeast Culture Routine Preliminary       These results will be followed up by pcp    Discharge Disposition   Discharged to home  Condition at discharge: Stable      Hospital Course      Lara Melendez is a 56 year old female admitted on 2/4/2022 with history of history of tobacco use, thyroid disease, hypertension, and recent left aubrey-mandibular abscess s/p cutaneous I&D x 2 (first on 1/10/202) and was admitted for trismus and shortness of breath as well as ongoing swelling from abscess. She was taken to OR by [facility] and underwent I&D on2/4/22 of left lateral pharyngeal space infection with placement of drain and debridement of left mandible and Cx from abscess growing gpc strep intermidius. She was evaluated by ID and recommended to take Clindamycin 300 mg QID for 6 weeks to cover for suspected osteomyelitis.   She was also noted to require oxygen and had wheezing. Given hx of tobacco use,  COPD was suspected, she was given steroids and nebulizer. She qualifies for home oxygen. She was advised to follow up with pcp and have PFT outpatient for evaluation of possible COPD.  She was deemed ready for discharge. She verbalized understanding and agreed with plan.          Consultations This Hospital Stay   INFECTIOUS DISEASE GENERAL ADULT IP CONSULT  VASCULAR ACCESS CARE ADULT IP CONSULT  SOCIAL WORK IP CONSULT  VASCULAR ACCESS CARE ADULT IP CONSULT    Code Status   Prior    Time Spent on this Encounter   ILES MD, personally saw the patient today and spent greater than 30 minutes discharging this patient.       LES ROWAN MD  [facility] [address] [address]  Phone: [phone]  ______________________________________________________________________    Physical Exam   Vital Signs:                   Weight: 130 lbs 0 oz  General Appearance: AOx 3, NAD  Respiratory: wheezing resolved, no crackles, breathing sounds equal in both lungs  Cardiovascular: S1 and S2 normal, RRR  GI: soft, non tender, non distended  HEENT: no tongue swelling, dressing intact at left mendible, slightly soaked, slight erythema and swelling present on left side of face  Other: Skin turgor normal, no rashes        Primary Care Physician   [facility]    Discharge Orders      Reason for your hospital stay    Left Mandibular abscess     Activity    Your activity upon discharge: activity as tolerated     Adult [facility] Follow-up and recommended labs and tests    Follow up with primary care provider, [facility], within 7 days for hospital follow- up.  No follow up labs or test are needed.      Appointments on [place] and/or [facility] (with [facility] or [facility] provider or service). Call [phone] if you haven't heard regarding these appointments within 7 days of discharge.     Oxygen Adult/Peds    Oxygen Documentation:   I certify that  this patient, Lara Melendez has been under my care (or a nurse practitioner or physican's assistant working with me). This is the face-to-face encounter for oxygen medical necessity.      Lara Melendez is now in a chronic stable state and continues to require supplemental oxygen. Patient has continued oxygen desaturation due to Chronic Bronchitis J41.0.    Alternative treatment(s) tried or considered and deemed clinically infective for treatment of COPD J44.9 include nebulizers, inhalers, steroids, and pulmonary toileting.  If portability is ordered, is the patient mobile within the home? yes    **Patients who qualify for home O2 coverage under the CMS guidelines require ABG tests or O2 sat readings obtained closest to, but no earlier than 2 days prior to the discharge, as evidence of the need for home oxygen therapy. Testing must be performed while patient is in the chronic stable state. See notes for O2 sats.     Diet    Follow this diet upon discharge: Orders Placed This Encounter      Mechanical/Dental Soft Diet       Significant Results and Procedures   Results for orders placed or performed during the hospital encounter of 02/03/22   XR Chest Port 1 View    Narrative    EXAM: XR CHEST PORT 1 VIEW  LOCATION: [facility]  DATE/TIME: 2/3/2022 9:27 PM    INDICATION: Dyspnea.  COMPARISON: 04/12/2021      Impression    IMPRESSION: The cardiomediastinal silhouette and pulmonary vascularity are normal. Chronic bronchiolar and alveolar calcifications within the posterior segment left upper lobe unchanged. No new focal consolidation, pneumothorax nor pleural effusion.   Soft tissue neck CT w contrast    Narrative    EXAM: CT SOFT TISSUE NECK W CONTRAST  LOCATION: [facility]  DATE/TIME: 2/3/2022 9:55 PM    INDICATION: Left mandibular abscess S/P I and D, stridor, dyspnea.  COMPARISON: None.  CONTRAST: 80 mL Isovue 370.  TECHNIQUE: Routine CT Soft Tissue Neck with IV  contrast. Multiplanar reformats. Dose reduction techniques were used.    FINDINGS:   Previously seen large left perimandibular abscess has resolved. There is a new collection extending superiorly from the medial margin of the submandibular gland along the medial margin of the medial pterygoid muscle into the  space filling the   left parapharyngeal space. This measures 2.0 x 1.2 x 2.9 cm AP x ML x craniocaudad. The medial pterygoid is mildly prominent and enhancing as is the left submandibular gland and submandibular space. There has been development of mild sclerosis and   slightly permeative appearance of the left hemimandible adjacent to the left posterior mandibular extraction socket for which early osteomyelitis is possible. Subcutaneous stranding and skin thickening over the left perimandibular face soft tissues is   likely related to previous cutaneous incision and drainage procedure. The airway is widely patent.    MUCOSAL SPACES/SOFT TISSUES: Normal mucosal spaces of the upper aerodigestive tract. No mucosal mass or inflammation identified. Normal vocal cords and infraglottic trachea.    LYMPH NODES: No pathologic lymph nodes by size or morphology criteria.     SALIVARY GLANDS: Submandibular gland and both parotid glands are normal.    THYROID: Normal.     VESSELS: Vascular structures of the neck are patent.    VISUALIZED INTRACRANIAL/ORBITS/SINUSES: No abnormality of the visualized intracranial compartment or orbits. Visualized paranasal sinuses and mastoid air cells are clear.    OTHER: Calcified nodules in the left upper lobe. Noncalcified nodule in the right upper lobe 4 mm is stable.      Impression    IMPRESSION:   1.  The previously seen large left perimandibular abscess on 01/10/2022 appears to have resolved. There is a new, smaller, collection extending cephalad from the margin of the left submandibular gland into the parapharyngeal and  spaces as   detailed above compatible  with abscess measuring 2.2 x 1.2 x 2.9 cm as detailed above. Adjacent soft tissue thickening and inflammation of the pterygoid musculature submandibular gland parapharyngeal space.    2.  No additional collection.    3.  Development of mild sclerosis and slightly permeative appearance of the left hemimandible adjacent to the left posterior mandibular molar extraction socket could represent early osteomyelitis.    4.  Airway is widely patent.       Discharge Medications   Discharge Medication List as of 2/7/2022  3:06 PM      START taking these medications    Details   clindamycin (CLEOCIN) 300 MG capsule Take 1 capsule (300 mg) by mouth 4 times daily, Disp-156 capsule, R-0, E-Prescribe      predniSONE (DELTASONE) 20 MG tablet Take 2 tablets (40 mg) by mouth daily, Disp-4 tablet, R-0, E-Prescribe         CONTINUE these medications which have CHANGED    Details   albuterol (PROAIR HFA/PROVENTIL HFA/VENTOLIN HFA) 108 (90 Base) MCG/ACT inhaler Inhale 2 puffs into the lungs every 4 hours as needed for shortness of breath / dyspnea or wheezing Inhale 1-2 Puffs every 4 hours as needed for Wheezing., Disp-18 g, R-0, E-PrescribePharmacy may dispense brand covered by insurance (Proair, or proventil  or ventolin or generic albuterol inhaler)      fluconazole (DIFLUCAN) 100 MG tablet Take 1 tablet (100 mg) by mouth daily, Disp-39 tablet, R-0, E-Prescribe         CONTINUE these medications which have NOT CHANGED    Details   acetaminophen (TYLENOL) 500 MG tablet Take 500-1,000 mg by mouth every 6 hours as needed for mild pain, Historical      atenolol (TENORMIN) 50 MG tablet Take 25 mg by mouth daily , Historical      atorvastatin (LIPITOR) 20 MG tablet Take 20 mg by mouth daily, Historical      FLOVENT  MCG/ACT inhaler Inhale 2 puffs into the lungs 2 times daily , DEANDRA, Historical      ibuprofen (ADVIL/MOTRIN) 200 MG tablet Take 200 mg by mouth every 4 hours as needed for mild pain, Historical      levothyroxine  (SYNTHROID/LEVOTHROID) 112 MCG tablet Take 112 mcg by mouth daily, Historical      PARoxetine (PAXIL) 10 MG tablet Take 1 tablet by mouth daily, Historical      oxyCODONE (ROXICODONE-INTENSOL) 20 mg/mL CONC (HIGH CONC) solution Take 5 mg by mouth every 6 hours as needed for moderate to severe pain, Historical           Allergies   Allergies   Allergen Reactions     Sulfa Drugs      Unasyn Hives     Penicillins Rash     Generalized rash  Rash, Generalized

## 2022-02-21 ENCOUNTER — APPOINTMENT (OUTPATIENT)
Dept: GENERAL RADIOLOGY | Facility: CLINIC | Age: 57
End: 2022-02-21
Attending: EMERGENCY MEDICINE
Payer: COMMERCIAL

## 2022-02-21 ENCOUNTER — APPOINTMENT (OUTPATIENT)
Dept: CT IMAGING | Facility: CLINIC | Age: 57
End: 2022-02-21
Attending: EMERGENCY MEDICINE
Payer: COMMERCIAL

## 2022-02-21 ENCOUNTER — HOSPITAL ENCOUNTER (INPATIENT)
Facility: CLINIC | Age: 57
LOS: 2 days | Discharge: HOME | End: 2022-02-23
Attending: EMERGENCY MEDICINE | Admitting: STUDENT IN AN ORGANIZED HEALTH CARE EDUCATION/TRAINING PROGRAM
Payer: COMMERCIAL

## 2022-02-21 DIAGNOSIS — R91.1 PULMONARY NODULE: ICD-10-CM

## 2022-02-21 DIAGNOSIS — J96.01 ACUTE RESPIRATORY FAILURE WITH HYPOXIA (H): ICD-10-CM

## 2022-02-21 DIAGNOSIS — J44.1 COPD EXACERBATION (H): ICD-10-CM

## 2022-02-21 LAB
ALBUMIN SERPL-MCNC: 4 G/DL (ref 3.4–5)
ALP SERPL-CCNC: 114 U/L (ref 40–150)
ALT SERPL W P-5'-P-CCNC: 54 U/L (ref 0–50)
ANION GAP SERPL CALCULATED.3IONS-SCNC: 6 MMOL/L (ref 3–14)
AST SERPL W P-5'-P-CCNC: 26 U/L (ref 0–45)
BASOPHILS # BLD AUTO: 0 10E3/UL (ref 0–0.2)
BASOPHILS NFR BLD AUTO: 0 %
BILIRUB SERPL-MCNC: 0.4 MG/DL (ref 0.2–1.3)
BUN SERPL-MCNC: 6 MG/DL (ref 7–30)
CALCIUM SERPL-MCNC: 9.9 MG/DL (ref 8.5–10.1)
CHLORIDE BLD-SCNC: 103 MMOL/L (ref 94–109)
CO2 SERPL-SCNC: 26 MMOL/L (ref 20–32)
CREAT SERPL-MCNC: 0.49 MG/DL (ref 0.52–1.04)
D DIMER PPP FEU-MCNC: 1.02 UG/ML FEU (ref 0–0.5)
EOSINOPHIL # BLD AUTO: 0.5 10E3/UL (ref 0–0.7)
EOSINOPHIL NFR BLD AUTO: 8 %
ERYTHROCYTE [DISTWIDTH] IN BLOOD BY AUTOMATED COUNT: 12.7 % (ref 10–15)
GFR SERPL CREATININE-BSD FRML MDRD: >90 ML/MIN/1.73M2
GLUCOSE BLD-MCNC: 125 MG/DL (ref 70–99)
HCT VFR BLD AUTO: 41.7 % (ref 35–47)
HGB BLD-MCNC: 13.9 G/DL (ref 11.7–15.7)
HOLD SPECIMEN: NORMAL
IMM GRANULOCYTES # BLD: 0 10E3/UL
IMM GRANULOCYTES NFR BLD: 0 %
LYMPHOCYTES # BLD AUTO: 1.4 10E3/UL (ref 0.8–5.3)
LYMPHOCYTES NFR BLD AUTO: 24 %
MCH RBC QN AUTO: 33.8 PG (ref 26.5–33)
MCHC RBC AUTO-ENTMCNC: 33.3 G/DL (ref 31.5–36.5)
MCV RBC AUTO: 102 FL (ref 78–100)
MONOCYTES # BLD AUTO: 0.6 10E3/UL (ref 0–1.3)
MONOCYTES NFR BLD AUTO: 11 %
NEUTROPHILS # BLD AUTO: 3.3 10E3/UL (ref 1.6–8.3)
NEUTROPHILS NFR BLD AUTO: 57 %
NRBC # BLD AUTO: 0 10E3/UL
NRBC BLD AUTO-RTO: 0 /100
NT-PROBNP SERPL-MCNC: 523 PG/ML (ref 0–900)
PLATELET # BLD AUTO: 329 10E3/UL (ref 150–450)
POTASSIUM BLD-SCNC: 4.3 MMOL/L (ref 3.4–5.3)
PROT SERPL-MCNC: 8.5 G/DL (ref 6.8–8.8)
RBC # BLD AUTO: 4.11 10E6/UL (ref 3.8–5.2)
SARS-COV-2 RNA RESP QL NAA+PROBE: NEGATIVE
SODIUM SERPL-SCNC: 135 MMOL/L (ref 133–144)
TROPONIN I SERPL HS-MCNC: 26 NG/L
WBC # BLD AUTO: 5.9 10E3/UL (ref 4–11)

## 2022-02-21 PROCEDURE — 36415 COLL VENOUS BLD VENIPUNCTURE: CPT | Performed by: EMERGENCY MEDICINE

## 2022-02-21 PROCEDURE — 87633 RESP VIRUS 12-25 TARGETS: CPT | Performed by: PHYSICIAN ASSISTANT

## 2022-02-21 PROCEDURE — C9803 HOPD COVID-19 SPEC COLLECT: HCPCS

## 2022-02-21 PROCEDURE — 93005 ELECTROCARDIOGRAM TRACING: CPT

## 2022-02-21 PROCEDURE — 120N000001 HC R&B MED SURG/OB

## 2022-02-21 PROCEDURE — 71275 CT ANGIOGRAPHY CHEST: CPT

## 2022-02-21 PROCEDURE — 94640 AIRWAY INHALATION TREATMENT: CPT

## 2022-02-21 PROCEDURE — 258N000003 HC RX IP 258 OP 636: Performed by: EMERGENCY MEDICINE

## 2022-02-21 PROCEDURE — 83880 ASSAY OF NATRIURETIC PEPTIDE: CPT | Performed by: EMERGENCY MEDICINE

## 2022-02-21 PROCEDURE — 71046 X-RAY EXAM CHEST 2 VIEWS: CPT

## 2022-02-21 PROCEDURE — 250N000013 HC RX MED GY IP 250 OP 250 PS 637: Performed by: EMERGENCY MEDICINE

## 2022-02-21 PROCEDURE — 99223 1ST HOSP IP/OBS HIGH 75: CPT | Mod: AI | Performed by: PHYSICIAN ASSISTANT

## 2022-02-21 PROCEDURE — 96360 HYDRATION IV INFUSION INIT: CPT | Mod: 59

## 2022-02-21 PROCEDURE — 85025 COMPLETE CBC W/AUTO DIFF WBC: CPT | Performed by: EMERGENCY MEDICINE

## 2022-02-21 PROCEDURE — 250N000011 HC RX IP 250 OP 636: Performed by: EMERGENCY MEDICINE

## 2022-02-21 PROCEDURE — 80053 COMPREHEN METABOLIC PANEL: CPT | Performed by: EMERGENCY MEDICINE

## 2022-02-21 PROCEDURE — 250N000009 HC RX 250: Performed by: EMERGENCY MEDICINE

## 2022-02-21 PROCEDURE — 999N000157 HC STATISTIC RCP TIME EA 10 MIN

## 2022-02-21 PROCEDURE — 250N000009 HC RX 250: Performed by: PHYSICIAN ASSISTANT

## 2022-02-21 PROCEDURE — 84484 ASSAY OF TROPONIN QUANT: CPT | Performed by: EMERGENCY MEDICINE

## 2022-02-21 PROCEDURE — 250N000013 HC RX MED GY IP 250 OP 250 PS 637: Performed by: PHYSICIAN ASSISTANT

## 2022-02-21 PROCEDURE — 94640 AIRWAY INHALATION TREATMENT: CPT | Mod: 76

## 2022-02-21 PROCEDURE — 87635 SARS-COV-2 COVID-19 AMP PRB: CPT | Performed by: EMERGENCY MEDICINE

## 2022-02-21 PROCEDURE — 85379 FIBRIN DEGRADATION QUANT: CPT | Performed by: EMERGENCY MEDICINE

## 2022-02-21 PROCEDURE — 99285 EMERGENCY DEPT VISIT HI MDM: CPT | Mod: 25

## 2022-02-21 PROCEDURE — 87486 CHLMYD PNEUM DNA AMP PROBE: CPT | Performed by: PHYSICIAN ASSISTANT

## 2022-02-21 PROCEDURE — 250N000011 HC RX IP 250 OP 636: Performed by: PHYSICIAN ASSISTANT

## 2022-02-21 RX ORDER — AZITHROMYCIN 250 MG/1
500 TABLET, FILM COATED ORAL ONCE
Status: COMPLETED | OUTPATIENT
Start: 2022-02-21 | End: 2022-02-21

## 2022-02-21 RX ORDER — AMOXICILLIN 250 MG
2 CAPSULE ORAL 2 TIMES DAILY PRN
Status: DISCONTINUED | OUTPATIENT
Start: 2022-02-21 | End: 2022-02-23 | Stop reason: HOSPADM

## 2022-02-21 RX ORDER — ATORVASTATIN CALCIUM 20 MG/1
20 TABLET, FILM COATED ORAL EVERY EVENING
Status: DISCONTINUED | OUTPATIENT
Start: 2022-02-22 | End: 2022-02-23 | Stop reason: HOSPADM

## 2022-02-21 RX ORDER — PREDNISONE 20 MG/1
40 TABLET ORAL ONCE
Status: DISCONTINUED | OUTPATIENT
Start: 2022-02-21 | End: 2022-02-21

## 2022-02-21 RX ORDER — LORAZEPAM 0.5 MG/1
.5-1 TABLET ORAL EVERY 4 HOURS PRN
Status: DISCONTINUED | OUTPATIENT
Start: 2022-02-21 | End: 2022-02-23 | Stop reason: HOSPADM

## 2022-02-21 RX ORDER — ALBUTEROL SULFATE 0.83 MG/ML
2.5 SOLUTION RESPIRATORY (INHALATION)
Status: DISCONTINUED | OUTPATIENT
Start: 2022-02-21 | End: 2022-02-23 | Stop reason: HOSPADM

## 2022-02-21 RX ORDER — FLUCONAZOLE 100 MG/1
100 TABLET ORAL DAILY
Status: DISCONTINUED | OUTPATIENT
Start: 2022-02-22 | End: 2022-02-23 | Stop reason: HOSPADM

## 2022-02-21 RX ORDER — ONDANSETRON 2 MG/ML
4 INJECTION INTRAMUSCULAR; INTRAVENOUS EVERY 6 HOURS PRN
Status: DISCONTINUED | OUTPATIENT
Start: 2022-02-21 | End: 2022-02-23 | Stop reason: HOSPADM

## 2022-02-21 RX ORDER — FAMOTIDINE 20 MG/1
20 TABLET, FILM COATED ORAL 2 TIMES DAILY
Status: DISCONTINUED | OUTPATIENT
Start: 2022-02-21 | End: 2022-02-23 | Stop reason: HOSPADM

## 2022-02-21 RX ORDER — IPRATROPIUM BROMIDE AND ALBUTEROL SULFATE 2.5; .5 MG/3ML; MG/3ML
3 SOLUTION RESPIRATORY (INHALATION) EVERY 4 HOURS
Status: DISCONTINUED | OUTPATIENT
Start: 2022-02-21 | End: 2022-02-23 | Stop reason: HOSPADM

## 2022-02-21 RX ORDER — IPRATROPIUM BROMIDE AND ALBUTEROL SULFATE 2.5; .5 MG/3ML; MG/3ML
3 SOLUTION RESPIRATORY (INHALATION) ONCE
Status: COMPLETED | OUTPATIENT
Start: 2022-02-21 | End: 2022-02-21

## 2022-02-21 RX ORDER — BENZONATATE 100 MG/1
100 CAPSULE ORAL 3 TIMES DAILY PRN
Status: DISCONTINUED | OUTPATIENT
Start: 2022-02-21 | End: 2022-02-23 | Stop reason: HOSPADM

## 2022-02-21 RX ORDER — ONDANSETRON 4 MG/1
4 TABLET, ORALLY DISINTEGRATING ORAL EVERY 6 HOURS PRN
Status: DISCONTINUED | OUTPATIENT
Start: 2022-02-21 | End: 2022-02-23 | Stop reason: HOSPADM

## 2022-02-21 RX ORDER — LEVOTHYROXINE SODIUM 112 UG/1
112 TABLET ORAL DAILY
Status: DISCONTINUED | OUTPATIENT
Start: 2022-02-22 | End: 2022-02-23 | Stop reason: HOSPADM

## 2022-02-21 RX ORDER — AMOXICILLIN 250 MG
1 CAPSULE ORAL 2 TIMES DAILY PRN
Status: DISCONTINUED | OUTPATIENT
Start: 2022-02-21 | End: 2022-02-23 | Stop reason: HOSPADM

## 2022-02-21 RX ORDER — ATENOLOL 25 MG/1
25 TABLET ORAL DAILY
Status: DISCONTINUED | OUTPATIENT
Start: 2022-02-22 | End: 2022-02-23 | Stop reason: HOSPADM

## 2022-02-21 RX ORDER — IOPAMIDOL 755 MG/ML
500 INJECTION, SOLUTION INTRAVASCULAR ONCE
Status: COMPLETED | OUTPATIENT
Start: 2022-02-21 | End: 2022-02-21

## 2022-02-21 RX ORDER — PAROXETINE 10 MG/1
10 TABLET, FILM COATED ORAL DAILY
Status: DISCONTINUED | OUTPATIENT
Start: 2022-02-22 | End: 2022-02-23 | Stop reason: HOSPADM

## 2022-02-21 RX ORDER — CLINDAMYCIN HCL 300 MG
300 CAPSULE ORAL 4 TIMES DAILY
Status: DISCONTINUED | OUTPATIENT
Start: 2022-02-21 | End: 2022-02-23 | Stop reason: HOSPADM

## 2022-02-21 RX ORDER — ACETAMINOPHEN 325 MG/1
650 TABLET ORAL EVERY 6 HOURS PRN
Status: DISCONTINUED | OUTPATIENT
Start: 2022-02-21 | End: 2022-02-23 | Stop reason: HOSPADM

## 2022-02-21 RX ORDER — CLINDAMYCIN HCL 150 MG
300 CAPSULE ORAL ONCE
Status: COMPLETED | OUTPATIENT
Start: 2022-02-21 | End: 2022-02-21

## 2022-02-21 RX ORDER — LIDOCAINE 40 MG/G
CREAM TOPICAL
Status: DISCONTINUED | OUTPATIENT
Start: 2022-02-21 | End: 2022-02-23 | Stop reason: HOSPADM

## 2022-02-21 RX ORDER — METHYLPREDNISOLONE SODIUM SUCCINATE 40 MG/ML
40 INJECTION, POWDER, LYOPHILIZED, FOR SOLUTION INTRAMUSCULAR; INTRAVENOUS EVERY 8 HOURS
Status: DISCONTINUED | OUTPATIENT
Start: 2022-02-22 | End: 2022-02-23 | Stop reason: HOSPADM

## 2022-02-21 RX ORDER — METHYLPREDNISOLONE SODIUM SUCCINATE 40 MG/ML
40 INJECTION, POWDER, LYOPHILIZED, FOR SOLUTION INTRAMUSCULAR; INTRAVENOUS 3 TIMES DAILY
Status: DISCONTINUED | OUTPATIENT
Start: 2022-02-21 | End: 2022-02-21

## 2022-02-21 RX ORDER — ACETAMINOPHEN 650 MG/1
650 SUPPOSITORY RECTAL EVERY 6 HOURS PRN
Status: DISCONTINUED | OUTPATIENT
Start: 2022-02-21 | End: 2022-02-23 | Stop reason: HOSPADM

## 2022-02-21 RX ADMIN — IPRATROPIUM BROMIDE AND ALBUTEROL SULFATE 3 ML: .5; 3 SOLUTION RESPIRATORY (INHALATION) at 14:53

## 2022-02-21 RX ADMIN — METHYLPREDNISOLONE SODIUM SUCCINATE 40 MG: 40 INJECTION, POWDER, FOR SOLUTION INTRAMUSCULAR; INTRAVENOUS at 19:30

## 2022-02-21 RX ADMIN — IPRATROPIUM BROMIDE AND ALBUTEROL SULFATE 3 ML: 2.5; .5 SOLUTION RESPIRATORY (INHALATION) at 20:00

## 2022-02-21 RX ADMIN — SODIUM CHLORIDE 500 ML: 9 INJECTION, SOLUTION INTRAVENOUS at 15:41

## 2022-02-21 RX ADMIN — CLINDAMYCIN HYDROCHLORIDE 300 MG: 300 CAPSULE ORAL at 21:45

## 2022-02-21 RX ADMIN — FAMOTIDINE 20 MG: 20 TABLET, FILM COATED ORAL at 21:45

## 2022-02-21 RX ADMIN — CLINDAMYCIN HYDROCHLORIDE 300 MG: 150 CAPSULE ORAL at 16:13

## 2022-02-21 RX ADMIN — AZITHROMYCIN MONOHYDRATE 500 MG: 250 TABLET ORAL at 17:36

## 2022-02-21 RX ADMIN — IOPAMIDOL 61 ML: 755 INJECTION, SOLUTION INTRAVENOUS at 15:48

## 2022-02-21 RX ADMIN — ALBUTEROL SULFATE 2.5 MG: 2.5 SOLUTION RESPIRATORY (INHALATION) at 21:33

## 2022-02-21 RX ADMIN — IPRATROPIUM BROMIDE AND ALBUTEROL SULFATE 3 ML: .5; 3 SOLUTION RESPIRATORY (INHALATION) at 14:22

## 2022-02-21 RX ADMIN — SODIUM CHLORIDE 84 ML: 9 INJECTION, SOLUTION INTRAVENOUS at 15:49

## 2022-02-21 RX ADMIN — ACETAMINOPHEN 650 MG: 325 TABLET, FILM COATED ORAL at 19:29

## 2022-02-21 ASSESSMENT — ACTIVITIES OF DAILY LIVING (ADL)
DRESSING/BATHING_DIFFICULTY: NO
DIFFICULTY_COMMUNICATING: NO
CHANGE_IN_FUNCTIONAL_STATUS_SINCE_ONSET_OF_CURRENT_ILLNESS/INJURY: NO
WEAR_GLASSES_OR_BLIND: YES
ADLS_ACUITY_SCORE: 12
ADLS_ACUITY_SCORE: 5
HEARING_DIFFICULTY_OR_DEAF: NO
ADLS_ACUITY_SCORE: 5
ADLS_ACUITY_SCORE: 5
ADLS_ACUITY_SCORE: 12
CONCENTRATING,_REMEMBERING_OR_MAKING_DECISIONS_DIFFICULTY: NO
WALKING_OR_CLIMBING_STAIRS_DIFFICULTY: NO
FALL_HISTORY_WITHIN_LAST_SIX_MONTHS: NO
DOING_ERRANDS_INDEPENDENTLY_DIFFICULTY: YES
ADLS_ACUITY_SCORE: 5
DIFFICULTY_EATING/SWALLOWING: NO
TOILETING_ISSUES: NO

## 2022-02-21 ASSESSMENT — ENCOUNTER SYMPTOMS
SHORTNESS OF BREATH: 1
FEVER: 0
WHEEZING: 1
CHILLS: 0

## 2022-02-21 NOTE — ED TRIAGE NOTES
Pt arrives via ED for increased SOB, suddenly on Saturday. EMS reports extremely tight lung sounds with wheezing. EMS gave 2 duonebs, and 125 solumedrol around 1256. Pt still feels SOB, but able to talk in full sentences with increased work of breathing. Pt on 4L NC. . 20 L R arm. Pt hypertensive for EMS.

## 2022-02-21 NOTE — ED NOTES
"Pt had previous surgery 2/4 on face. Went home with O2 for comfort and getting up, pt states \"she did not use the oxygen except at night for comfort.\" SOB started Saturday but got severely worse today. Pt in the process of quitting smoking. Denies blood thinner use.   "

## 2022-02-21 NOTE — PHARMACY-ADMISSION MEDICATION HISTORY
Admission medication history interview status for this patient is complete. See [facility] admission navigator for allergy information, prior to admission medications and immunization status.     Medication history interview done, indicate source(s): Patient  Medication history resources (including written lists, pill bottles, clinic record):None  Pharmacy: -    Changes made to PTA medication list:  Added: -  Changed: -  Reported as Not Taking: -  Removed: prednisone, oxycodone    Actions taken by pharmacist (provider contacted, etc):None     Additional medication history information:None    Medication reconciliation/reorder completed by provider prior to medication history?  no   (Y/N)     For patients on insulin therapy:   Do you use sliding scale insulin based on blood sugars?   What is your pre-meal insulin coverage?    Do you typically eat three meals a day?   How many times do you check your blood glucose per day?   How many episodes of hypoglycemia do you typically have per month?   Do you have a Continuous Glucose Monitor (CGM)?      Prior to Admission medications    Medication Sig Last Dose Taking? Auth Provider   acetaminophen (TYLENOL) 500 MG tablet Take 500-1,000 mg by mouth every 6 hours as needed for mild pain  Yes Reported, Patient   albuterol (PROAIR HFA/PROVENTIL HFA/VENTOLIN HFA) 108 (90 Base) MCG/ACT inhaler Inhale 2 puffs into the lungs every 4 hours as needed for shortness of breath / dyspnea or wheezing Inhale 1-2 Puffs every 4 hours as needed for Wheezing.  Yes Tio Kessler MD   atenolol (TENORMIN) 50 MG tablet Take 25 mg by mouth daily  2/21/2022 at Unknown time Yes Reported, Patient   atorvastatin (LIPITOR) 20 MG tablet Take 20 mg by mouth daily 2/21/2022 at Unknown time Yes Reported, Patient   clindamycin (CLEOCIN) 300 MG capsule Take 1 capsule (300 mg) by mouth 4 times daily 2/21/2022 at x2 Yes Tio Kessler MD   FLOVENT  MCG/ACT inhaler Inhale 2 puffs into the lungs 2 times  daily  Past Month at Unknown time Yes Reported, Patient   fluconazole (DIFLUCAN) 100 MG tablet Take 1 tablet (100 mg) by mouth daily 2/21/2022 at Unknown time Yes Tio Kessler MD   ibuprofen (ADVIL/MOTRIN) 200 MG tablet Take 200 mg by mouth every 4 hours as needed for mild pain  Yes Reported, Patient   levothyroxine (SYNTHROID/LEVOTHROID) 112 MCG tablet Take 112 mcg by mouth daily 2/21/2022 at Unknown time Yes Reported, Patient   PARoxetine (PAXIL) 10 MG tablet Take 1 tablet by mouth daily 2/21/2022 at Unknown time Yes Reported, Patient

## 2022-02-21 NOTE — H&P
[facility]  Internal Medicine  History and Physical      Patient Name: Lara Melendez MRN# [id number]   Age: 56 year old YOB: 1965     Date of Admission:2/21/2022    Primary care provider: [facility]  Date of Service: 2/21/2022         Assessment and Plan:   Lara Melendez is a 56 year old female with a history of HTN, HLD, Hypothyroidism, Diverticulitis, Tobacco Abuse, Anxiety who presents to the ED today with shortness of breath and wheezing.    Acute Hypoxic Respiratory Failure 2/2 COPD Exacerbation - hx of 30 years of tobacco abuse and frequent bronchitis episodes.  No formal COPD diagnosis with PFTs, but imaging consistent.  Started on prn oxygen during her hospital stay earlier this month, but has not used consistently until she became shortness of breath on 2/19.  Currently on 3 liters, afebrile, normal wbc, negative troponin and BNP.  CT chest c/w COPD.  - start IV Solumedrol  - continue Flovent  - start scheduled Duonebs and prn Albuterol  - will need referral to Pulmonary upon discharge and outpatient PFTs.  - check influenza swab    Hx Mandibular Abscess - recent left aubrey-mandibular abscess s/p cutaneous I&D x 2 (first on 1/10/202) and was admitted for trismus and shortness of breath as well as ongoing swelling from abscess. She was [place] [facility] [place] I&D on2/4/22 of left lateral pharyngeal space infection with placement of drain and debridement of left mandible and Cx from abscess growing gpc strep intermidius. She was evaluated by ID and recommended to take Clindamycin 300 mg QID for 6 weeks to cover for suspected osteomyelitis.   - continue Clindamycin and Fluconazole    HTN - continue Atenolol    HLD - continue Atorvastatin    Hypothyroidism - continue Levothyroxine    Anxiety - continue Paxil    Pulmonary Nodule - noted on CT imaging.  Recommend follow up with Pulmonary upon discharge for follow up monitoring and establish care for  COPD.    CODE: full  Diet/IVF: regular  GI ppx:  pepcid  DVT ppx: scd    Patient discussed with Dr. Latoya Fenton MS PA-C  Physician Assistant   Hospitalist Service  Pager: [phone]           Chief Complaint:   sob         HPI:   56 year old female with a history of HTN, HLD, Hypothyroidism, Diverticulitis, Tobacco Abuse, Anxiety who presents to the ED today with shortness of breath and wheezing.    Patient reports she has a 30 year hx of tobacco use of 1-2 ppd. She quit 3 weeks ago while she was hospitalized for a mandibular abscess and noted to have hypoxia and discharge home with prn oxygen.  Patient reports she has a chronic cough which has been unchanged.  She has used the oxygen at night, but has not needed it during the day.  On 2/19, she developed increased shortness of breath and started using her oxygen consistently at 2-3 liters.  Today, she had increased shortness of breath and cough not catch her breath.  She denies chest pain.  Reports wheezing currently, but feels improved since admission.           Past Medical History:     Past Medical History:   Diagnosis Date     Anxiety      Elevated cholesterol      Hypertension      Thyroid disease           Past Surgical History:     Past Surgical History:   Procedure Laterality Date     CHOLECYSTECTOMY       ENT SURGERY      tooth extraction     INCISION AND DRAINAGE MANDIBLE, COMBINED Left 1/10/2022    Procedure: INCISION AND DRAINAGE, MANDIBLE;  Surgeon: Jn Feliz DDS;  Location: [facility] OR     INCISION AND DRAINAGE MANDIBLE, COMBINED Left 2/4/2022    Procedure: INCISION AND DRAINAGE, MANDIBLE;  Surgeon: Taylor Ortez DDS;  Location: [facility] OR          Social History:     Social History     Socioeconomic History     Marital status:      Spouse name: Not on file     Number of children: Not on file     Years of education: Not on file     Highest education level: Not on file   Occupational History     Not on file   Tobacco Use     Smoking  status: Current Every Day Smoker     Packs/day: 1.00     Smokeless tobacco: Never Used   Substance and Sexual Activity     Alcohol use: Yes     Comment: occ     Drug use: Never     Sexual activity: Not on file   Other Topics Concern     Not on file   Social History Narrative     Not on file     Social Determinants of Health     Financial Resource Strain: Not on file   Food Insecurity: Not on file   Transportation Needs: Not on file   Physical Activity: Not on file   Stress: Not on file   Social Connections: Not on file   Intimate Partner Violence: Not on file   Housing Stability: Not on file          Family History:   No family history on file.       Allergies:      Allergies   Allergen Reactions     Sulfa Drugs      Unasyn Hives     Penicillins Rash     Generalized rash  Rash, Generalized            Medications:     Prior to Admission medications    Medication Sig Last Dose Taking? Auth Provider   acetaminophen (TYLENOL) 500 MG tablet Take 500-1,000 mg by mouth every 6 hours as needed for mild pain   Reported, Patient   albuterol (PROAIR HFA/PROVENTIL HFA/VENTOLIN HFA) 108 (90 Base) MCG/ACT inhaler Inhale 2 puffs into the lungs every 4 hours as needed for shortness of breath / dyspnea or wheezing Inhale 1-2 Puffs every 4 hours as needed for Wheezing.   Tio Kessler MD   atenolol (TENORMIN) 50 MG tablet Take 25 mg by mouth daily    Reported, Patient   atorvastatin (LIPITOR) 20 MG tablet Take 20 mg by mouth daily   Reported, Patient   clindamycin (CLEOCIN) 300 MG capsule Take 1 capsule (300 mg) by mouth 4 times daily   Tio Kessler MD   FLOVENT  MCG/ACT inhaler Inhale 2 puffs into the lungs 2 times daily    Reported, Patient   fluconazole (DIFLUCAN) 100 MG tablet Take 1 tablet (100 mg) by mouth daily   Tio Kessler MD   ibuprofen (ADVIL/MOTRIN) 200 MG tablet Take 200 mg by mouth every 4 hours as needed for mild pain   Reported, Patient   levothyroxine (SYNTHROID/LEVOTHROID) 112 MCG tablet Take  112 mcg by mouth daily   Reported, Patient   oxyCODONE (ROXICODONE-INTENSOL) 20 mg/mL CONC (HIGH CONC) solution Take 5 mg by mouth every 6 hours as needed for moderate to severe pain  Patient not taking: Reported on 1/10/2022   Reported, Patient   PARoxetine (PAXIL) 10 MG tablet Take 1 tablet by mouth daily   Reported, Patient   predniSONE (DELTASONE) 20 MG tablet Take 2 tablets (40 mg) by mouth daily   Tio Kessler, MD          Review of Systems:   A complete ROS was performed and is negative other than what is stated in the HPI.       Physical Exam:   Blood pressure (!) 153/96, pulse 96, temperature 97.9  F (36.6  C), temperature source Oral, resp. rate 16, weight 59 kg (130 lb), SpO2 93 %.  General: Alert, interactive, NAD, sitting up in bed, pleasant and cooperative.  HEENT: AT/NC.  Left mandible incision c/d/i  Chest/Resp: prolonged expiratory phase, diffuse wheezing bilaterally, able to speak in full sentences, no acute distress  Heart/CV: regular rate and rhythm, no murmur  Abdomen/GI: Soft, nontender, nondistended. +BS.  No rebound or guarding.  Extremities/MSK: No LE edema  Skin: Warm and dry  Neuro: Alert & oriented x 3, no focal deficits, moves all extremities equally         Labs:   ROUTINE ICU LABS (Last four results)  CMP  Recent Labs   Lab 02/21/22  1337      POTASSIUM 4.3   CHLORIDE 103   CO2 26   ANIONGAP 6   *   BUN 6*   CR 0.49*   GFRESTIMATED >90   EDIN 9.9   PROTTOTAL 8.5   ALBUMIN 4.0   BILITOTAL 0.4   ALKPHOS 114   AST 26   ALT 54*     CBC  Recent Labs   Lab 02/21/22  1337   WBC 5.9   RBC 4.11   HGB 13.9   HCT 41.7   *   MCH 33.8*   MCHC 33.3   RDW 12.7        INRNo lab results found in last 7 days.  Arterial Blood GasNo lab results found in last 7 days.       Imaging/Procedures:     Results for orders placed or performed during the hospital encounter of 02/21/22   Chest XR,  PA & LAT    Narrative    CHEST TWO VIEWS 2/21/2022 1:57 PM     HISTORY:  Dyspnea    COMPARISON: 2/3/2022    FINDINGS: Heart size and pulmonary vascularity within normal limits.  Calcifications in the apex of the left lung are unchanged. The lungs  are mildly hyperexpanded, but otherwise clear. No pneumothorax or  pleural effusion.       Impression    IMPRESSION: No radiographic evidence of acute chest abnormality.     CHET RENTERIA MD         SYSTEM ID:  [id number]   CT Chest Pulmonary Embolism w Contrast    Narrative    CT CHEST PULMONARY EMBOLISM W CONTRAST 2/21/2022 3:58 PM    CLINICAL HISTORY: PE suspected, low/intermediate prob, positive  D-dimer. Shortness of breath.  TECHNIQUE: CT angiogram chest during arterial phase injection IV  contrast. 2D and 3D MIP reconstructions were performed by the CT  technologist. Dose reduction techniques were used.     CONTRAST: 61mL Isovue-370    COMPARISON: Chest x-ray today    FINDINGS:  ANGIOGRAM CHEST: Pulmonary arteries are normal caliber and negative  for pulmonary emboli. Thoracic aorta is negative for dissection. No CT  evidence of right heart strain.    LUNGS AND PLEURA: Mild centrilobular emphysema. Mild bronchial wall  thickening and bronchial mucous plugging predominantly in the lung  bases and left upper lobe. Multiple punctate calcified nodules in the  left lung apex, likely the sequela of prior granulomatous disease.  Right apical noncalcified 3 mm nodule (series 9, image 52). No  infiltrate or pleural effusion.    MEDIASTINUM/AXILLAE: No lymphadenopathy. Normal heart size. No  thoracic aortic aneurysm. Mild coronary artery calcifications. No  pericardial effusion. Linear soft tissue thickening in the anterior  mediastinum measuring about 1.1 x 1.1 x 2.4 cm (series 8, image 299  and series 2, image 51), likely a slip of muscle.    UPPER ABDOMEN: Visualized portions of the upper abdomen are within  normal limits.    MUSCULOSKELETAL: No suspicious lesions of the bones.      Impression    IMPRESSION:  1.  No pulmonary emboli.  2.  Mild  emphysema. Mild bronchial wall thickening, likely  infectious/inflammatory.  3.  Noncalcified right upper lobe 3 mm nodule. Please see follow-up  guidelines.  4.  Multiple punctate calcified nodules in the left lung apex, likely  related to prior granulomatous disease.    Recommendations for one or multiple incidental lung nodules < 6mm :    Low risk patients: No routine follow-up.    High risk patients: Optional follow-up CT at 12 months; if  unchanged, no further follow-up.    *Low Risk: Minimal or absent history of smoking or other known risk  factors.  *Nonsolid (ground glass) or partly solid nodules may require longer  follow-up to exclude indolent adenocarcinoma.  *Recommendations based on Guidelines for the Management of Incidental  Pulmonary Nodules Detected at CT: From the Fleischner Society 2017,  Radiology 2017.       GREER MONTES MD         SYSTEM ID:  [id number]

## 2022-02-21 NOTE — ED PROVIDER NOTES
"  History     Chief Complaint:  Shortness of Breath       HPI   Lara Melendez is a 56 year old female with a 30-year history of 1 pack/day smoking who presents with turns for wheezing and shortness of breath.  Her symptoms started on Saturday and progressively worsened to the point where \"I could not breathe\" this morning.  She notes she has a \"chronic smoker's cough\".  It has not worsened but actually over the past day or two she has been unable to cough because her breathing feels so tight.  She denies leg swelling or pain.  She denies chest pain.  She denies fever or chills.  She denies exposure to any ill contacts.  She was recently admitted for a mandibular abscess and had wheezing and shortness of breath at that time for which she was given a breathing treatment and thought to presumably have COPD.  She was not sent home with steroid or any other therapy aside from home oxygen which she has been using at night but has not needed to use it during the day until today.  She notes her shortness of breath had resolved at home and it was not until Saturday that she started to feel short of breath again.  She reports quitting smoking 3 weeks ago.  She notes that after receiving a nebulizer via EMS she is feeling better but still having trouble breathing.    ROS:  Review of Systems   Constitutional: Negative for chills and fever.   Respiratory: Positive for shortness of breath and wheezing.    Cardiovascular: Negative for chest pain and leg swelling.   All other systems reviewed and are negative.    Allergies:  Sulfa Drugs  Unasyn  Penicillins     Medications:    acetaminophen (TYLENOL) 500 MG tablet  albuterol (PROAIR HFA/PROVENTIL HFA/VENTOLIN HFA) 108 (90 Base) MCG/ACT inhaler  atenolol (TENORMIN) 50 MG tablet  atorvastatin (LIPITOR) 20 MG tablet  clindamycin (CLEOCIN) 300 MG capsule  FLOVENT  MCG/ACT inhaler  fluconazole (DIFLUCAN) 100 MG tablet  ibuprofen (ADVIL/MOTRIN) 200 MG tablet  levothyroxine " (SYNTHROID/LEVOTHROID) 112 MCG tablet  oxyCODONE (ROXICODONE-INTENSOL) 20 mg/mL CONC (HIGH CONC) solution  PARoxetine (PAXIL) 10 MG tablet  predniSONE (DELTASONE) 20 MG tablet        Past Medical History:    Past Medical History:   Diagnosis Date     Anxiety      Elevated cholesterol      Hypertension      Thyroid disease      Patient Active Problem List   Diagnosis     Abscess of left jaw     Hypothyroidism     Psoriasis     Tobacco use disorder     Essential hypertension     Anxiety     Mixed hyperlipidemia     Lab test negative for COVID-19 virus     Status post incision and drainage     Mandibular abscess     Chronic obstructive pulmonary disease, unspecified COPD type (H)     Dyspnea, unspecified type        Past Surgical History:    Past Surgical History:   Procedure Laterality Date     CHOLECYSTECTOMY       ENT SURGERY      tooth extraction     INCISION AND DRAINAGE MANDIBLE, COMBINED Left 1/10/2022    Procedure: INCISION AND DRAINAGE, MANDIBLE;  Surgeon: Jn Feliz DDS;  Location: [facility] OR     INCISION AND DRAINAGE MANDIBLE, COMBINED Left 2/4/2022    Procedure: INCISION AND DRAINAGE, MANDIBLE;  Surgeon: Taylor Ortez DDS;  Location: [facility] OR        Family History:    Noncontributory    Social History:   reports that she has been smoking. She has been smoking about 1.00 pack per day. She has never used smokeless tobacco. She reports current alcohol use. She reports that she does not use drugs.  She reports quitting smoking at the start of February 2022.  PCP: [facility]     Physical Exam     Patient Vitals for the past 24 hrs:   BP Temp Temp src Pulse Resp SpO2 Weight   02/21/22 1345 -- -- -- -- -- 93 % --   02/21/22 1328 (!) 150/109 97.7  F (36.5  C) Oral 74 24 93 % 59 kg (130 lb)        Physical Exam  General: Adult female sitting upright, in respiratory distress  Eyes: PERRL, Conjunctive within normal limits  ENT: Moist mucous membranes, oropharynx clear.   CV: Normal S1S2, no  murmur, rub or gallop.  Tachycardic with any movement, regular rate and rhythm.  Resp: Diminished throughout with expiratory wheezing and prolonged expiratory phase.  Increased work of breathing.  Tachypneic.  Able to speak in short sentences.  GI: Abdomen is soft, nontender and nondistended. No palpable masses. No rebound or guarding.  MSK: No edema. Nontender. Normal active range of motion.  Skin: Warm and dry. No rashes or lesions or ecchymoses on visible skin.  Neuro: Alert and oriented. Responds appropriately to all questions and commands. No focal findings appreciated. Normal muscle tone.  Psych: Appropriate mood and affect.     Emergency Department Course   ECG:  ECG taken at 1408, ECG read at 1418  Normal sinus rhythm  Nonspecific ST abnormality  Abnormal ECG  Rate 94 bpm. AR interval 156 ms. QRS duration 76 ms. QT/QTc 342/427 ms. P-R-T axes 65 61 55.    Imaging:  CT Chest Pulmonary Embolism w Contrast   Final Result   IMPRESSION:   1.  No pulmonary emboli.   2.  Mild emphysema. Mild bronchial wall thickening, likely   infectious/inflammatory.   3.  Noncalcified right upper lobe 3 mm nodule. Please see follow-up   guidelines.   4.  Multiple punctate calcified nodules in the left lung apex, likely   related to prior granulomatous disease.      Recommendations for one or multiple incidental lung nodules < 6mm :     Low risk patients: No routine follow-up.     High risk patients: Optional follow-up CT at 12 months; if   unchanged, no further follow-up.      *Low Risk: Minimal or absent history of smoking or other known risk   factors.   *Nonsolid (ground glass) or partly solid nodules may require longer   follow-up to exclude indolent adenocarcinoma.   *Recommendations based on Guidelines for the Management of Incidental   Pulmonary Nodules Detected at CT: From the Fleischner Society 2017,   Radiology 2017.          GREER MONTES MD            SYSTEM ID:  [id number]      Chest XR,  PA & LAT   Final Result    IMPRESSION: No radiographic evidence of acute chest abnormality.       CHET RENTERIA MD            SYSTEM ID:  [id number]         Report per radiology    Laboratory:  Labs Ordered and Resulted from Time of ED Arrival to Time of ED Departure   COMPREHENSIVE METABOLIC PANEL - Abnormal       Result Value    Sodium 135      Potassium 4.3      Chloride 103      Carbon Dioxide (CO2) 26      Anion Gap 6      Urea Nitrogen 6 (*)     Creatinine 0.49 (*)     Calcium 9.9      Glucose 125 (*)     Alkaline Phosphatase 114      AST 26      ALT 54 (*)     Protein Total 8.5      Albumin 4.0      Bilirubin Total 0.4      GFR Estimate >90     D DIMER QUANTITATIVE - Abnormal    D-Dimer Quantitative 1.02 (*)    CBC WITH PLATELETS AND DIFFERENTIAL - Abnormal    WBC Count 5.9      RBC Count 4.11      Hemoglobin 13.9      Hematocrit 41.7       (*)     MCH 33.8 (*)     MCHC 33.3      RDW 12.7      Platelet Count 329      % Neutrophils 57      % Lymphocytes 24      % Monocytes 11      % Eosinophils 8      % Basophils 0      % Immature Granulocytes 0      NRBCs per 100 WBC 0      Absolute Neutrophils 3.3      Absolute Lymphocytes 1.4      Absolute Monocytes 0.6      Absolute Eosinophils 0.5      Absolute Basophils 0.0      Absolute Immature Granulocytes 0.0      Absolute NRBCs 0.0     TROPONIN I - Normal    Troponin I High Sensitivity 26     NT PROBNP INPATIENT - Normal    N terminal Pro BNP Inpatient 523     COVID-19 VIRUS (CORONAVIRUS) BY PCR - Normal    SARS CoV2 PCR Negative          Emergency Department Course:    Reviewed:  I reviewed nursing notes, vitals and past medical history    Assessments:  I obtained history and examined the patient as noted above.   RN notes with ambulation to commode becomes significantly short of breath and hypoxic into the 80s.  I rechecked the patient and explained findings.  She notes overall improvement in symptoms with any exertion becomes significantly symptomatic.  She does not feel safe at  home.    Consults:   I discussed the patient with Olga Cheatham of the hospitalist service who accepted her to the hospital service    Interventions:  Medications   azithromycin (ZITHROMAX) tablet 500 mg (has no administration in time range)   ipratropium - albuterol 0.5 mg/2.5 mg/3 mL (DUONEB) neb solution 3 mL (3 mLs Nebulization Given 2/21/22 1422)   ipratropium - albuterol 0.5 mg/2.5 mg/3 mL (DUONEB) neb solution 3 mL (3 mLs Nebulization Given 2/21/22 1453)   0.9% sodium chloride BOLUS (0 mLs Intravenous Stopped 2/21/22 1614)   clindamycin (CLEOCIN) capsule 300 mg (300 mg Oral Given 2/21/22 1613)   CT Scan Flush (84 mLs Intravenous Given 2/21/22 1549)   iopamidol (ISOVUE-370) solution 500 mL (61 mLs Intravenous Given 2/21/22 1548)        Disposition:  The patient was admitted to the hospital under the care of .     Impression & Plan      Covid-19  Lara Melendez was evaluated during a global COVID-19 pandemic, which necessitated consideration that the patient might be at risk for infection with the SARS-CoV-2 virus that causes COVID-19.   Applicable protocols for evaluation were followed during the patient's care.   COVID-19 was considered as part of the patient's evaluation. The plan for testing is:  a test was obtained during this visit.    Medical Decision Making:  Lara Melendez is a 56 year old female who presents for evaluation of shortness of breath and wheezing.  Signs and symptoms are consistent with COPD exacerbation, although the patient has not had a previous diagnosis.  It is strongly suggested by her lengthy smoking history and her overall presentation..  A broad differential was considered including foreign body, reactive airway disease, pneumothorax, cardiac equivalent/ACS, viral induced wheezing, allergic phenomena, pneumonia, etc.  Patient feels improved after interventions here in ED but continues to have impairment in respiratory function including oxygen on exertion and significant  shortness of breath, even at rest.  Given this, I will hospitalize for further intervention.      There are no signs at this point of any other serious etiologies including those mentioned above especially acute coronary syndrome. I doubt this is ACS given the classic story of COPD exacerbation given by the patient, the marked wheezing without rales and a nonspecific EKG despite long duration of symptoms. No signs of CHF.  Given a single dose of antibiotic given concerns for nonspecific inflammation/infection, although no clinical evidence of pneumonia.  Pulmonary nodule was noted and should be followed up as appropriate given her smoking history.  I discussed admission with the patient.  All questions were answered prior.    Diagnosis:    ICD-10-CM    1. COPD exacerbation (H)  J44.1    2. Acute respiratory failure with hypoxia (H)  J96.01    3. Pulmonary nodule  R91.1          Scribe Disclosure:  I, Sathish Meryl, am serving as a scribe at 2:36 PM on 2/21/2022 to document services personally performed by Jessica Hauser MD based on my observations and the provider's statements to me.    2/21/2022   Jessica Hauser MD Jonkman, Tracy Dianne, MD  02/21/22 3568

## 2022-02-21 NOTE — ED NOTES
Pt sitting upright in bed resting with lights dimmed.  Work of breathing less at rest.  O2 sat 94-95% on 3L/NC at rest.  Work of breathing increases and O2 sat drops to mid 80's% when pt gets up to bedside commode.  Alert, oriented.  Denies pain.  Monitor with NSR, rate 85-90.  Denies additional c/o's.

## 2022-02-21 NOTE — ED NOTES
[facility]  ED Nurse Handoff Report    Lara Melendez is a 56 year old female   ED Chief complaint: Shortness of Breath  . ED Diagnosis:   Final diagnoses:   COPD exacerbation (H)   Acute respiratory failure with hypoxia (H)   Pulmonary nodule     Allergies:   Allergies   Allergen Reactions     Sulfa Drugs      Unasyn Hives     Penicillins Rash     Generalized rash  Rash, Generalized         Code Status: Full Code  Activity level - Baseline/Home:  Independent. Activity Level - Current:   Independent. Lift room needed: No. Bariatric: No   Needed: No   Isolation: No. Infection: Not Applicable.     Vital Signs:   Vitals:    02/21/22 1445 02/21/22 1600 02/21/22 1615 02/21/22 1658   BP:  (!) 153/89  (!) 152/103   Pulse:  102 98 93   Resp:   22 20   Temp:       TempSrc:       SpO2: (!) 89%  95% 94%   Weight:           Cardiac Rhythm:  ,   Cardiac  Cardiac Rhythm: Normal sinus rhythm  Pain level:    Patient confused: No. Patient Falls Risk: No.   Elimination Status: Has voided   Patient Report - Initial Complaint: c/o 2 day history of increased work of breathing. Requiring O2/NC 24/7 over past 2 days. . Focused Assessment: pt with increased work of breathing with activity and talking.     Tests Performed:   Labs Ordered and Resulted from Time of ED Arrival to Time of ED Departure   COMPREHENSIVE METABOLIC PANEL - Abnormal       Result Value    Sodium 135      Potassium 4.3      Chloride 103      Carbon Dioxide (CO2) 26      Anion Gap 6      Urea Nitrogen 6 (*)     Creatinine 0.49 (*)     Calcium 9.9      Glucose 125 (*)     Alkaline Phosphatase 114      AST 26      ALT 54 (*)     Protein Total 8.5      Albumin 4.0      Bilirubin Total 0.4      GFR Estimate >90     D DIMER QUANTITATIVE - Abnormal    D-Dimer Quantitative 1.02 (*)    CBC WITH PLATELETS AND DIFFERENTIAL - Abnormal    WBC Count 5.9      RBC Count 4.11      Hemoglobin 13.9      Hematocrit 41.7       (*)     MCH 33.8 (*)     MCHC  33.3      RDW 12.7      Platelet Count 329      % Neutrophils 57      % Lymphocytes 24      % Monocytes 11      % Eosinophils 8      % Basophils 0      % Immature Granulocytes 0      NRBCs per 100 WBC 0      Absolute Neutrophils 3.3      Absolute Lymphocytes 1.4      Absolute Monocytes 0.6      Absolute Eosinophils 0.5      Absolute Basophils 0.0      Absolute Immature Granulocytes 0.0      Absolute NRBCs 0.0     TROPONIN I - Normal    Troponin I High Sensitivity 26     NT PROBNP INPATIENT - Normal    N terminal Pro BNP Inpatient 523     COVID-19 VIRUS (CORONAVIRUS) BY PCR - Normal    SARS CoV2 PCR Negative       CT Chest Pulmonary Embolism w Contrast   Final Result   IMPRESSION:   1.  No pulmonary emboli.   2.  Mild emphysema. Mild bronchial wall thickening, likely   infectious/inflammatory.   3.  Noncalcified right upper lobe 3 mm nodule. Please see follow-up   guidelines.   4.  Multiple punctate calcified nodules in the left lung apex, likely   related to prior granulomatous disease.      Recommendations for one or multiple incidental lung nodules < 6mm :     Low risk patients: No routine follow-up.     High risk patients: Optional follow-up CT at 12 months; if   unchanged, no further follow-up.      *Low Risk: Minimal or absent history of smoking or other known risk   factors.   *Nonsolid (ground glass) or partly solid nodules may require longer   follow-up to exclude indolent adenocarcinoma.   *Recommendations based on Guidelines for the Management of Incidental   Pulmonary Nodules Detected at CT: From the Fleischner Society 2017,   Radiology 2017.          GREER MONTES MD            SYSTEM ID:  [id number]      Chest XR,  PA & LAT   Final Result   IMPRESSION: No radiographic evidence of acute chest abnormality.       CHET RENTERIA MD            SYSTEM ID:  [id number]        . Abnormal Results: see above.   Treatments provided: nebs x4. antibiotics  Family Comments: n/a  OBS brochure/video discussed/provided to  patient:  N/A  ED Medications:   Medications   azithromycin (ZITHROMAX) tablet 500 mg (has no administration in time range)   ipratropium - albuterol 0.5 mg/2.5 mg/3 mL (DUONEB) neb solution 3 mL (3 mLs Nebulization Given 2/21/22 1422)   ipratropium - albuterol 0.5 mg/2.5 mg/3 mL (DUONEB) neb solution 3 mL (3 mLs Nebulization Given 2/21/22 1453)   0.9% sodium chloride BOLUS (0 mLs Intravenous Stopped 2/21/22 1614)   clindamycin (CLEOCIN) capsule 300 mg (300 mg Oral Given 2/21/22 1613)   CT Scan Flush (84 mLs Intravenous Given 2/21/22 1549)   iopamidol (ISOVUE-370) solution 500 mL (61 mLs Intravenous Given 2/21/22 1548)     Drips infusing:  No  For the majority of the shift, the patient's behavior Green. Interventions performed were n/a.    Sepsis treatment initiated: No     Patient tested for COVID 19 prior to admission: YES    ED Nurse Name/Phone Number: Chelsea Nieves RN,   5:16 PM    RECEIVING UNIT ED HANDOFF REVIEW    Above ED Nurse Handoff Report was reviewed: Yes  Reviewed by: Lorena Sanford RN on February 21, 2022 at 6:18 PM

## 2022-02-22 LAB
ATRIAL RATE - MUSE: 94 BPM
C PNEUM DNA SPEC QL NAA+PROBE: NOT DETECTED
DIASTOLIC BLOOD PRESSURE - MUSE: NORMAL MMHG
FLUAV H1 2009 PAND RNA SPEC QL NAA+PROBE: NOT DETECTED
FLUAV H1 RNA SPEC QL NAA+PROBE: NOT DETECTED
FLUAV H3 RNA SPEC QL NAA+PROBE: NOT DETECTED
FLUAV RNA SPEC QL NAA+PROBE: NOT DETECTED
FLUBV RNA SPEC QL NAA+PROBE: NOT DETECTED
HADV DNA SPEC QL NAA+PROBE: NOT DETECTED
HCOV PNL SPEC NAA+PROBE: NOT DETECTED
HMPV RNA SPEC QL NAA+PROBE: NOT DETECTED
HPIV1 RNA SPEC QL NAA+PROBE: NOT DETECTED
HPIV2 RNA SPEC QL NAA+PROBE: NOT DETECTED
HPIV3 RNA SPEC QL NAA+PROBE: NOT DETECTED
HPIV4 RNA SPEC QL NAA+PROBE: NOT DETECTED
INTERPRETATION ECG - MUSE: NORMAL
M PNEUMO DNA SPEC QL NAA+PROBE: NOT DETECTED
P AXIS - MUSE: 65 DEGREES
PR INTERVAL - MUSE: 156 MS
QRS DURATION - MUSE: 76 MS
QT - MUSE: 342 MS
QTC - MUSE: 427 MS
R AXIS - MUSE: 61 DEGREES
RSV RNA SPEC QL NAA+PROBE: NOT DETECTED
RSV RNA SPEC QL NAA+PROBE: NOT DETECTED
RV+EV RNA SPEC QL NAA+PROBE: NOT DETECTED
SYSTOLIC BLOOD PRESSURE - MUSE: NORMAL MMHG
T AXIS - MUSE: 55 DEGREES
VENTRICULAR RATE- MUSE: 94 BPM

## 2022-02-22 PROCEDURE — 250N000013 HC RX MED GY IP 250 OP 250 PS 637: Performed by: PHYSICIAN ASSISTANT

## 2022-02-22 PROCEDURE — 120N000001 HC R&B MED SURG/OB

## 2022-02-22 PROCEDURE — 250N000013 HC RX MED GY IP 250 OP 250 PS 637: Performed by: INTERNAL MEDICINE

## 2022-02-22 PROCEDURE — 94640 AIRWAY INHALATION TREATMENT: CPT

## 2022-02-22 PROCEDURE — 94640 AIRWAY INHALATION TREATMENT: CPT | Mod: 76

## 2022-02-22 PROCEDURE — 999N000157 HC STATISTIC RCP TIME EA 10 MIN

## 2022-02-22 PROCEDURE — 250N000009 HC RX 250: Performed by: PHYSICIAN ASSISTANT

## 2022-02-22 PROCEDURE — 250N000011 HC RX IP 250 OP 636: Performed by: PHYSICIAN ASSISTANT

## 2022-02-22 PROCEDURE — 99232 SBSQ HOSP IP/OBS MODERATE 35: CPT | Performed by: INTERNAL MEDICINE

## 2022-02-22 RX ORDER — NICOTINE 21 MG/24HR
1 PATCH, TRANSDERMAL 24 HOURS TRANSDERMAL DAILY
Status: DISCONTINUED | OUTPATIENT
Start: 2022-02-22 | End: 2022-02-23 | Stop reason: HOSPADM

## 2022-02-22 RX ADMIN — METHYLPREDNISOLONE SODIUM SUCCINATE 40 MG: 40 INJECTION, POWDER, FOR SOLUTION INTRAMUSCULAR; INTRAVENOUS at 04:09

## 2022-02-22 RX ADMIN — FAMOTIDINE 20 MG: 20 TABLET, FILM COATED ORAL at 20:27

## 2022-02-22 RX ADMIN — METHYLPREDNISOLONE SODIUM SUCCINATE 40 MG: 40 INJECTION, POWDER, FOR SOLUTION INTRAMUSCULAR; INTRAVENOUS at 20:28

## 2022-02-22 RX ADMIN — IPRATROPIUM BROMIDE AND ALBUTEROL SULFATE 3 ML: 2.5; .5 SOLUTION RESPIRATORY (INHALATION) at 19:52

## 2022-02-22 RX ADMIN — CLINDAMYCIN HYDROCHLORIDE 300 MG: 300 CAPSULE ORAL at 21:59

## 2022-02-22 RX ADMIN — LORAZEPAM 0.5 MG: 0.5 TABLET ORAL at 09:28

## 2022-02-22 RX ADMIN — METHYLPREDNISOLONE SODIUM SUCCINATE 40 MG: 40 INJECTION, POWDER, FOR SOLUTION INTRAMUSCULAR; INTRAVENOUS at 12:33

## 2022-02-22 RX ADMIN — CLINDAMYCIN HYDROCHLORIDE 300 MG: 300 CAPSULE ORAL at 09:24

## 2022-02-22 RX ADMIN — IPRATROPIUM BROMIDE AND ALBUTEROL SULFATE 3 ML: 2.5; .5 SOLUTION RESPIRATORY (INHALATION) at 11:57

## 2022-02-22 RX ADMIN — PAROXETINE HYDROCHLORIDE 10 MG: 10 TABLET, FILM COATED ORAL at 09:25

## 2022-02-22 RX ADMIN — CLINDAMYCIN HYDROCHLORIDE 300 MG: 300 CAPSULE ORAL at 12:29

## 2022-02-22 RX ADMIN — IPRATROPIUM BROMIDE AND ALBUTEROL SULFATE 3 ML: 2.5; .5 SOLUTION RESPIRATORY (INHALATION) at 08:17

## 2022-02-22 RX ADMIN — NICOTINE 1 PATCH: 14 PATCH, EXTENDED RELEASE TRANSDERMAL at 12:30

## 2022-02-22 RX ADMIN — FLUTICASONE FUROATE 1 PUFF: 200 POWDER RESPIRATORY (INHALATION) at 08:17

## 2022-02-22 RX ADMIN — CLINDAMYCIN HYDROCHLORIDE 300 MG: 300 CAPSULE ORAL at 18:17

## 2022-02-22 RX ADMIN — ATENOLOL 25 MG: 25 TABLET ORAL at 09:23

## 2022-02-22 RX ADMIN — LEVOTHYROXINE SODIUM 112 MCG: 0.11 TABLET ORAL at 09:25

## 2022-02-22 RX ADMIN — FAMOTIDINE 20 MG: 20 TABLET, FILM COATED ORAL at 09:24

## 2022-02-22 RX ADMIN — FLUCONAZOLE 100 MG: 100 TABLET ORAL at 09:22

## 2022-02-22 RX ADMIN — IPRATROPIUM BROMIDE AND ALBUTEROL SULFATE 3 ML: 2.5; .5 SOLUTION RESPIRATORY (INHALATION) at 16:25

## 2022-02-22 RX ADMIN — IPRATROPIUM BROMIDE AND ALBUTEROL SULFATE 3 ML: 2.5; .5 SOLUTION RESPIRATORY (INHALATION) at 00:02

## 2022-02-22 RX ADMIN — LORAZEPAM 0.5 MG: 0.5 TABLET ORAL at 00:27

## 2022-02-22 RX ADMIN — IPRATROPIUM BROMIDE AND ALBUTEROL SULFATE 3 ML: 2.5; .5 SOLUTION RESPIRATORY (INHALATION) at 04:13

## 2022-02-22 RX ADMIN — ATORVASTATIN CALCIUM 20 MG: 20 TABLET, FILM COATED ORAL at 20:27

## 2022-02-22 ASSESSMENT — ACTIVITIES OF DAILY LIVING (ADL)
ADLS_ACUITY_SCORE: 5

## 2022-02-22 NOTE — PLAN OF CARE
Pertinent assessments: A&O, dyspnea and wheezes, on 3 L NC, no SOB reported, gave ativan for anxiety    Major Shift Events: uneventful    Treatment Plan: O2, IV steroids, neb treatments

## 2022-02-22 NOTE — PROGRESS NOTES
End of Shift Summary  For vital signs and complete assessments, please see documentation flowsheets.     Pertinent assessments: Pt alert & oriented x4. VSS. LS diminished, o2 at 95% 3L, pt does complain of SOB, albuterol neb given and effective. No pain noted. Pt independent in the room. Pt did complain of anxiety but didn't want anything, I told patient I would ask MD to order some anxiety meds just in case she wants to take some later, so patient is aware it is available to her.    Major Shift Events none     Treatment Plan: continue w/ oxygen therapy, encouraging deep breathing, anxiety management, and IV steroids     Bedside Nurse: Lorena Sanford RN

## 2022-02-22 NOTE — PROGRESS NOTES
[facility]    Hospitalist Progress Note  Provider : Ceci Bowen MD  Date of Service (when I saw the patient): 02/22/2022    Assessment & Plan   Lara Melendez is a 56 year old female with a history of hypertension, hyperlipidemia, hypothyroidism, diverticulitis, Tobacco Abuse, Anxiety who presents to the ED today with shortness of breath and wheezing.     Acute Hypoxic Respiratory Failure 2/2 COPD Exacerbation - hx of 30 years of tobacco abuse and frequent bronchitis episodes.  No formal COPD diagnosis with PFTs, but imaging consistent.  Started on prn oxygen during her hospital stay earlier this month, but has not used consistently until she became shortness of breath on 2/19.  Currently on 3 liters, afebrile, normal wbc, negative troponin and BNP.  CT chest c/w COPD.  -continue IV Solumedrol  -continue Flovent  -started scheduled Duonebs and prn Albuterol  -will need referral to Pulmonary upon discharge and outpatient PFTs.  - check influenza swab     History Mandibular Abscess - recent left aubrey-mandibular abscess s/p cutaneous I&D x 2 (first on 1/10/202) and was admitted for trismus and shortness of breath as well as ongoing swelling from abscess. She was taken to OR by [facility] and underwent I&D on2/4/22 of left lateral pharyngeal space infection with placement of drain and debridement of left mandible and Cx from abscess growing gpc strep intermidius. She was evaluated by ID and recommended to take Clindamycin 300 mg QID for 6 weeks to cover for suspected osteomyelitis.   - continue Clindamycin and Fluconazole     Hypertension - continue Atenolol     Hyperlipidemia - continue Atorvastatin     Hypothyroidism - continue Levothyroxine     Anxiety - continue Paxil     Pulmonary Nodule - noted on CT imaging.    Will need follow-up with Pulmonary upon discharge for follow up monitoring and establish care for COPD.        DVT Prophylaxis: Pneumatic Compression Devices  Code Status:  Full Code    Disposition: Expected discharge in 1-2 days if her breathing improved    Ceci Bowen MD    Interval History   Patient seen and examined.  She stated that she is feeling a little better but still has shortness of breath.  She says and wheezy.  She has no fever.  Also denies significant cough.  No nausea, vomiting, abdominal pain, dysuria, urgency or frequency.    -Data reviewed today: I reviewed all new labs and imaging results over the last 24 hours. I personally reviewed    Physical Exam   Temp: 98.8  F (37.1  C) Temp src: Oral BP: 128/70 Pulse: 99   Resp: 16 SpO2: 92 % O2 Device: Nasal cannula Oxygen Delivery: 3 LPM  Vitals:    02/21/22 1328   Weight: 59 kg (130 lb)     Vital Signs with Ranges  Temp:  [97.7  F (36.5  C)-98.8  F (37.1  C)] 98.8  F (37.1  C)  Pulse:  [] 99  Resp:  [16-26] 16  BP: (123-158)/() 128/70  SpO2:  [89 %-99 %] 92 %  No intake/output data recorded.    GEN:  Alert, oriented x 3, appears comfortable, NAD.  HEENT:  Normocephalic/atraumatic, no scleral icterus, no nasal discharge, mouth moist.  CV:  Regular rate and rhythm, no murmur or JVD.  S1 + S2 noted, no S3 or S4.  LUNGS:  Clear to auscultation bilaterally without rales/rhonchi/wheezing/retractions.  Symmetric chest rise on inhalation noted.  ABD:  Active bowel sounds, soft, non-tender/non-distended.  No rebound/guarding/rigidity.  EXT:  No edema or cyanosis.  Hands/feet warm to touch with good signs of peripheral perfusion.  No joint synovitis noted.  SKIN:  Dry to touch, no exanthems noted in the visualized areas.  NEURO:  Symmetric muscle strength, sensation to touch grossly intact.  No new focal deficits appreciated.    Medications       atenolol  25 mg Oral Daily     atorvastatin  20 mg Oral QPM     clindamycin  300 mg Oral 4x Daily     famotidine  20 mg Oral BID     fluconazole  100 mg Oral Daily     fluticasone  1 puff Inhalation Daily     ipratropium - albuterol 0.5 mg/2.5 mg/3 mL  3 mL Nebulization  Q4H     levothyroxine  112 mcg Oral Daily     methylPREDNISolone  40 mg Intravenous Q8H     PARoxetine  10 mg Oral Daily     sodium chloride (PF)  3 mL Intracatheter Q8H       Data   Recent Labs   Lab 02/21/22  1337   WBC 5.9   HGB 13.9   *         POTASSIUM 4.3   CHLORIDE 103   CO2 26   BUN 6*   CR 0.49*   ANIONGAP 6   EDIN 9.9   *   ALBUMIN 4.0   PROTTOTAL 8.5   BILITOTAL 0.4   ALKPHOS 114   ALT 54*   AST 26       Recent Results (from the past 24 hour(s))   Chest XR,  PA & LAT    Narrative    CHEST TWO VIEWS 2/21/2022 1:57 PM     HISTORY: Dyspnea    COMPARISON: 2/3/2022    FINDINGS: Heart size and pulmonary vascularity within normal limits.  Calcifications in the apex of the left lung are unchanged. The lungs  are mildly hyperexpanded, but otherwise clear. No pneumothorax or  pleural effusion.       Impression    IMPRESSION: No radiographic evidence of acute chest abnormality.     CHET RENTERIA MD         SYSTEM ID:  [id number]   CT Chest Pulmonary Embolism w Contrast    Narrative    CT CHEST PULMONARY EMBOLISM W CONTRAST 2/21/2022 3:58 PM    CLINICAL HISTORY: PE suspected, low/intermediate prob, positive  D-dimer. Shortness of breath.  TECHNIQUE: CT angiogram chest during arterial phase injection IV  contrast. 2D and 3D MIP reconstructions were performed by the CT  technologist. Dose reduction techniques were used.     CONTRAST: 61mL Isovue-370    COMPARISON: Chest x-ray today    FINDINGS:  ANGIOGRAM CHEST: Pulmonary arteries are normal caliber and negative  for pulmonary emboli. Thoracic aorta is negative for dissection. No CT  evidence of right heart strain.    LUNGS AND PLEURA: Mild centrilobular emphysema. Mild bronchial wall  thickening and bronchial mucous plugging predominantly in the lung  bases and left upper lobe. Multiple punctate calcified nodules in the  left lung apex, likely the sequela of prior granulomatous disease.  Right apical noncalcified 3 mm nodule (series 9,  image 52). No  infiltrate or pleural effusion.    MEDIASTINUM/AXILLAE: No lymphadenopathy. Normal heart size. No  thoracic aortic aneurysm. Mild coronary artery calcifications. No  pericardial effusion. Linear soft tissue thickening in the anterior  mediastinum measuring about 1.1 x 1.1 x 2.4 cm (series 8, image 299  and series 2, image 51), likely a slip of muscle.    UPPER ABDOMEN: Visualized portions of the upper abdomen are within  normal limits.    MUSCULOSKELETAL: No suspicious lesions of the bones.      Impression    IMPRESSION:  1.  No pulmonary emboli.  2.  Mild emphysema. Mild bronchial wall thickening, likely  infectious/inflammatory.  3.  Noncalcified right upper lobe 3 mm nodule. Please see follow-up  guidelines.  4.  Multiple punctate calcified nodules in the left lung apex, likely  related to prior granulomatous disease.    Recommendations for one or multiple incidental lung nodules < 6mm :    Low risk patients: No routine follow-up.    High risk patients: Optional follow-up CT at 12 months; if  unchanged, no further follow-up.    *Low Risk: Minimal or absent history of smoking or other known risk  factors.  *Nonsolid (ground glass) or partly solid nodules may require longer  follow-up to exclude indolent adenocarcinoma.  *Recommendations based on Guidelines for the Management of Incidental  Pulmonary Nodules Detected at CT: From the Fleischner Society 2017,  Radiology 2017.       GREER MONTES MD         SYSTEM ID:  [id number]

## 2022-02-23 ENCOUNTER — DOCUMENTATION ONLY (OUTPATIENT)
Dept: HOME HEALTH SERVICES | Facility: CLINIC | Age: 57
End: 2022-02-23
Payer: COMMERCIAL

## 2022-02-23 VITALS
OXYGEN SATURATION: 91 % | DIASTOLIC BLOOD PRESSURE: 66 MMHG | BODY MASS INDEX: 21.63 KG/M2 | WEIGHT: 130 LBS | RESPIRATION RATE: 20 BRPM | HEART RATE: 101 BPM | TEMPERATURE: 98.4 F | SYSTOLIC BLOOD PRESSURE: 122 MMHG

## 2022-02-23 PROCEDURE — 250N000009 HC RX 250: Performed by: PHYSICIAN ASSISTANT

## 2022-02-23 PROCEDURE — 250N000013 HC RX MED GY IP 250 OP 250 PS 637: Performed by: INTERNAL MEDICINE

## 2022-02-23 PROCEDURE — 99238 HOSP IP/OBS DSCHRG MGMT 30/<: CPT | Performed by: INTERNAL MEDICINE

## 2022-02-23 PROCEDURE — 94640 AIRWAY INHALATION TREATMENT: CPT

## 2022-02-23 PROCEDURE — 999N000157 HC STATISTIC RCP TIME EA 10 MIN

## 2022-02-23 PROCEDURE — 250N000013 HC RX MED GY IP 250 OP 250 PS 637: Performed by: PHYSICIAN ASSISTANT

## 2022-02-23 PROCEDURE — 94640 AIRWAY INHALATION TREATMENT: CPT | Mod: 76

## 2022-02-23 PROCEDURE — 250N000011 HC RX IP 250 OP 636: Performed by: PHYSICIAN ASSISTANT

## 2022-02-23 RX ORDER — PREDNISONE 20 MG/1
TABLET ORAL
Qty: 20 TABLET | Refills: 0 | Status: SHIPPED | OUTPATIENT
Start: 2022-02-23 | End: 2022-04-02

## 2022-02-23 RX ORDER — ALBUTEROL SULFATE 0.83 MG/ML
2.5 SOLUTION RESPIRATORY (INHALATION) EVERY 4 HOURS PRN
Qty: 30 ML | Refills: 0 | Status: ON HOLD | OUTPATIENT
Start: 2022-02-23 | End: 2022-04-03

## 2022-02-23 RX ADMIN — FAMOTIDINE 20 MG: 20 TABLET, FILM COATED ORAL at 08:28

## 2022-02-23 RX ADMIN — FLUCONAZOLE 100 MG: 100 TABLET ORAL at 08:27

## 2022-02-23 RX ADMIN — NICOTINE 1 PATCH: 14 PATCH, EXTENDED RELEASE TRANSDERMAL at 08:29

## 2022-02-23 RX ADMIN — LEVOTHYROXINE SODIUM 112 MCG: 0.11 TABLET ORAL at 08:28

## 2022-02-23 RX ADMIN — ATENOLOL 25 MG: 25 TABLET ORAL at 08:28

## 2022-02-23 RX ADMIN — FLUTICASONE FUROATE 1 PUFF: 200 POWDER RESPIRATORY (INHALATION) at 09:07

## 2022-02-23 RX ADMIN — IPRATROPIUM BROMIDE AND ALBUTEROL SULFATE 3 ML: 2.5; .5 SOLUTION RESPIRATORY (INHALATION) at 00:31

## 2022-02-23 RX ADMIN — IPRATROPIUM BROMIDE AND ALBUTEROL SULFATE 3 ML: 2.5; .5 SOLUTION RESPIRATORY (INHALATION) at 09:07

## 2022-02-23 RX ADMIN — IPRATROPIUM BROMIDE AND ALBUTEROL SULFATE 3 ML: 2.5; .5 SOLUTION RESPIRATORY (INHALATION) at 04:12

## 2022-02-23 RX ADMIN — PAROXETINE HYDROCHLORIDE 10 MG: 10 TABLET, FILM COATED ORAL at 08:28

## 2022-02-23 RX ADMIN — IPRATROPIUM BROMIDE AND ALBUTEROL SULFATE 3 ML: 2.5; .5 SOLUTION RESPIRATORY (INHALATION) at 11:37

## 2022-02-23 RX ADMIN — METHYLPREDNISOLONE SODIUM SUCCINATE 40 MG: 40 INJECTION, POWDER, FOR SOLUTION INTRAMUSCULAR; INTRAVENOUS at 04:12

## 2022-02-23 RX ADMIN — CLINDAMYCIN HYDROCHLORIDE 300 MG: 300 CAPSULE ORAL at 08:27

## 2022-02-23 ASSESSMENT — ACTIVITIES OF DAILY LIVING (ADL)
CHANGE_IN_FUNCTIONAL_STATUS_SINCE_ONSET_OF_CURRENT_ILLNESS/INJURY: NO
ADLS_ACUITY_SCORE: 4
CONCENTRATING,_REMEMBERING_OR_MAKING_DECISIONS_DIFFICULTY: NO
ADLS_ACUITY_SCORE: 4
TOILETING_ISSUES: NO
DIFFICULTY_EATING/SWALLOWING: NO
ADLS_ACUITY_SCORE: 4
WEAR_GLASSES_OR_BLIND: YES
ADLS_ACUITY_SCORE: 5
ADLS_ACUITY_SCORE: 4
DIFFICULTY_COMMUNICATING: NO
ADLS_ACUITY_SCORE: 4
HEARING_DIFFICULTY_OR_DEAF: NO
FALL_HISTORY_WITHIN_LAST_SIX_MONTHS: NO
DOING_ERRANDS_INDEPENDENTLY_DIFFICULTY: NO
ADLS_ACUITY_SCORE: 4
DRESSING/BATHING_DIFFICULTY: NO
ADLS_ACUITY_SCORE: 4
WALKING_OR_CLIMBING_STAIRS_DIFFICULTY: NO

## 2022-02-23 NOTE — PROGRESS NOTES
Patient has been assessed for Home Oxygen needs. Oxygen readings:    *Pulse oximetry (SpO2) = 92% on room air at rest while awake.    *SpO2 = 86% on room air during activity/with exercise.    *SpO2 improved to 91% on 2liters/minute during activity/with exercise.

## 2022-02-23 NOTE — PLAN OF CARE
End of Shift Summary  For vital signs and complete assessments, please see documentation flowsheets.     Pertinent assessments: A&O, dyspnea and wheezes. VSS on 3 L NC. No SOB reported. Jaw wound CDI.    Major Shift Events: none    Treatment Plan: O2, IV steroids, neb treatments

## 2022-02-23 NOTE — PROGRESS NOTES
Pt up independently, pt states feeling ready to go home, pt denies pain, IV removed without complications, discharge education completed with pt, pt  to bring O2 tank for discharge home, pt taken out to front entrance via wheelchair, no acute changes.

## 2022-02-23 NOTE — PLAN OF CARE
End of Shift Summary  For vital signs and complete assessments, please see documentation flowsheets.     Pertinent assessments: Afebrile. Pt continues to require 2L oxygen in mid 90s. LS diminished, pt is BLUE, denies feeling SOB. Denies pain and nausea. Regular diet. Independently ambulating. PIV - SL. Incision on (L) jaw covered, no drainage noted. A&O. Slept well.     Major Shift Events: none    Treatment Plan: Nebs, Solumedrol, Cleocin, Diflucan

## 2022-02-23 NOTE — PROGRESS NOTES
Oxygen Documentation:   I certify that this patient, Lara Melendez has been under my care (or a nurse practitioner or physican's assistant working with me). This is the face-to-face encounter for oxygen medical necessity.      Lara Melendez is now in a chronic stable state and continues to require supplemental oxygen. Patient has continued oxygen desaturation due to COPD J44.9.    Alternative treatment(s) tried or considered and deemed clinically infective for treatment of COPD J44.9 include nebulizers, inhalers, steroids, and pulmonary toileting.  If portability is ordered, is the patient mobile within the home? yes    **Patients who qualify for home O2 coverage under the CMS guidelines require ABG tests or O2 sat readings obtained closest to, but no earlier than 2 days prior to the discharge, as evidence of the need for home oxygen therapy. Testing must be performed while patient is in the chronic stable state. See notes for O2 sats.**

## 2022-02-23 NOTE — DISCHARGE SUMMARY
[facility]    Discharge Summary  Hospitalist    Date of Admission:  2/21/2022  Date of Discharge:  2/23/2022  Discharging Provider: Ceci Bowen MD  Date of Service (when I saw the patient): 02/23/22    Discharge Diagnoses     Acute Hypoxic Respiratory Failure 2/2 COPD Exacerbation     History Mandibular Abscess      Hypertension     Hyperlipidemia      Hypothyroidism      Anxiety     Pulmonary Nodule     History of Present Illness   Lara Melendez is an 56 year old female who presented with COPD exacerbation. Please see hospital course for details.     Hospital Course   Lara Melendez is a 56 year old female with a history of hypertension, hyperlipidemia, hypothyroidism, diverticulitis, Tobacco Abuse, Anxiety who presents to the ED today with shortness of breath and wheezing.     Acute Hypoxic Respiratory Failure 2/2 COPD Exacerbation - history of of 30 years of tobacco abuse and frequent bronchitis episodes.  No formal COPD diagnosis with PFTs, but imaging consistent. Started on prn oxygen during her hospital stay earlier this month, but has not used consistently until she became shortness of breath on 2/19. Currently on 3 liters, afebrile, normal wbc, negative troponin and BNP.  CT chest c/w COPD.  -continued IV Solumedrol during hospital course. Steroids changed to prednisone taper on discharge  -continued Flovent  -started scheduled Duonebs and prn Albuterol  -Pulmonary upon discharge and outpatient PFTs.    History Mandibular Abscess - recent left aubrey-mandibular abscess s/p cutaneous I&D x 2 (first on 1/10/202) and was admitted for trismus and shortness of breath as well as ongoing swelling from abscess. She was taken to OR by [facility] and underwent I&D on2/4/22 of left lateral pharyngeal space infection with placement of drain and debridement of left mandible and Cx from abscess growing gpc strep intermidius. She was evaluated by ID and recommended to take Clindamycin 300 mg  QID for 6 weeks to cover for suspected osteomyelitis.   -continued Clindamycin and Fluconazole     Hypertension - continued Atenolol     Hyperlipidemia - continued Atorvastatin     Hypothyroidism - continued Levothyroxine     Anxiety - continued Paxil     Pulmonary Nodule - noted on CT imaging.  Pulmonary upon discharge for follow up monitoring and establish care for COPD.    Significant Results and Procedures   Results for orders placed or performed during the hospital encounter of 02/21/22   Chest XR,  PA & LAT    Narrative    CHEST TWO VIEWS 2/21/2022 1:57 PM     HISTORY: Dyspnea    COMPARISON: 2/3/2022    FINDINGS: Heart size and pulmonary vascularity within normal limits.  Calcifications in the apex of the left lung are unchanged. The lungs  are mildly hyperexpanded, but otherwise clear. No pneumothorax or  pleural effusion.       Impression    IMPRESSION: No radiographic evidence of acute chest abnormality.     CHET RENTERIA MD         SYSTEM ID:  [id number]   CT Chest Pulmonary Embolism w Contrast    Narrative    CT CHEST PULMONARY EMBOLISM W CONTRAST 2/21/2022 3:58 PM    CLINICAL HISTORY: PE suspected, low/intermediate prob, positive  D-dimer. Shortness of breath.  TECHNIQUE: CT angiogram chest during arterial phase injection IV  contrast. 2D and 3D MIP reconstructions were performed by the CT  technologist. Dose reduction techniques were used.     CONTRAST: 61mL Isovue-370    COMPARISON: Chest x-ray today    FINDINGS:  ANGIOGRAM CHEST: Pulmonary arteries are normal caliber and negative  for pulmonary emboli. Thoracic aorta is negative for dissection. No CT  evidence of right heart strain.    LUNGS AND PLEURA: Mild centrilobular emphysema. Mild bronchial wall  thickening and bronchial mucous plugging predominantly in the lung  bases and left upper lobe. Multiple punctate calcified nodules in the  left lung apex, likely the sequela of prior granulomatous disease.  Right apical noncalcified 3 mm nodule  (series 9, image 52). No  infiltrate or pleural effusion.    MEDIASTINUM/AXILLAE: No lymphadenopathy. Normal heart size. No  thoracic aortic aneurysm. Mild coronary artery calcifications. No  pericardial effusion. Linear soft tissue thickening in the anterior  mediastinum measuring about 1.1 x 1.1 x 2.4 cm (series 8, image 299  and series 2, image 51), likely a slip of muscle.    UPPER ABDOMEN: Visualized portions of the upper abdomen are within  normal limits.    MUSCULOSKELETAL: No suspicious lesions of the bones.      Impression    IMPRESSION:  1.  No pulmonary emboli.  2.  Mild emphysema. Mild bronchial wall thickening, likely  infectious/inflammatory.  3.  Noncalcified right upper lobe 3 mm nodule. Please see follow-up  guidelines.  4.  Multiple punctate calcified nodules in the left lung apex, likely  related to prior granulomatous disease.    Recommendations for one or multiple incidental lung nodules < 6mm :    Low risk patients: No routine follow-up.    High risk patients: Optional follow-up CT at 12 months; if  unchanged, no further follow-up.    *Low Risk: Minimal or absent history of smoking or other known risk  factors.  *Nonsolid (ground glass) or partly solid nodules may require longer  follow-up to exclude indolent adenocarcinoma.  *Recommendations based on Guidelines for the Management of Incidental  Pulmonary Nodules Detected at CT: From the Fleischner Society 2017,  Radiology 2017.       GREER MONTES MD         SYSTEM ID:  [id number]         Pending Results   None    Code Status   Full Code       Primary Care Physician   [facility]        Discharge Disposition   Discharged to home  Condition at discharge: Stable    Consultations This Hospital Stay   None    Time Spent on this Encounter   Ceci WHITE MD, MD, personally saw the patient today and spent less than or equal to 30 minutes discharging this patient.    Discharge Orders      Reason for your hospital stay     COPD exacerbation     Activity    Your activity upon discharge: activity as tolerated     Follow-up and recommended labs and tests     Follow up with primary care provider, [facility], within 7 days   Outpatient follow up with pulmonary for follow up on lung nodule     Oxygen Adult/Peds    Oxygen Documentation:   I certify that this patient, Lara Melendez has been under my care (or a nurse practitioner or physican's assistant working with me). This is the face-to-face encounter for oxygen medical necessity.      Lara Melendez is now in a chronic stable state and continues to require supplemental oxygen. Patient has continued oxygen desaturation due to COPD J44.9.    Alternative treatment(s) tried or considered and deemed clinically infective for treatment of COPD J44.9 include nebulizers, inhalers, steroids, and pulmonary toileting.  If portability is ordered, is the patient mobile within the home? yes    **Patients who qualify for home O2 coverage under the CMS guidelines require ABG tests or O2 sat readings obtained closest to, but no earlier than 2 days prior to the discharge, as evidence of the need for home oxygen therapy. Testing must be performed while patient is in the chronic stable state. See notes for O2 sats.**     Diet    Follow this diet upon discharge: Orders Placed This Encounter      Combination Diet Regular Diet Adult     Discharge Medications   Current Discharge Medication List      START taking these medications    Details   albuterol (PROVENTIL) (2.5 MG/3ML) 0.083% neb solution Take 1 vial (2.5 mg) by nebulization every 4 hours as needed for shortness of breath / dyspnea or wheezing  Qty: 30 mL, Refills: 0    Associated Diagnoses: COPD exacerbation (H)      predniSONE (DELTASONE) 20 MG tablet Take 3 tabs by mouth daily x 3 days, then 2 tabs daily x 3 days, then 1 tab daily x 3 days, then 1/2 tab daily x 3 days.  Qty: 20 tablet, Refills: 0    Associated Diagnoses: COPD  exacerbation (H)         CONTINUE these medications which have NOT CHANGED    Details   acetaminophen (TYLENOL) 500 MG tablet Take 500-1,000 mg by mouth every 6 hours as needed for mild pain      albuterol (PROAIR HFA/PROVENTIL HFA/VENTOLIN HFA) 108 (90 Base) MCG/ACT inhaler Inhale 2 puffs into the lungs every 4 hours as needed for shortness of breath / dyspnea or wheezing Inhale 1-2 Puffs every 4 hours as needed for Wheezing.  Qty: 18 g, Refills: 0    Comments: Pharmacy may dispense brand covered by insurance (Proair, or proventil or ventolin or generic albuterol inhaler)  Associated Diagnoses: Chronic obstructive pulmonary disease, unspecified COPD type (H)      atenolol (TENORMIN) 50 MG tablet Take 25 mg by mouth daily       atorvastatin (LIPITOR) 20 MG tablet Take 20 mg by mouth daily      clindamycin (CLEOCIN) 300 MG capsule Take 1 capsule (300 mg) by mouth 4 times daily  Qty: 156 capsule, Refills: 0    Associated Diagnoses: Abscess of left jaw      FLOVENT  MCG/ACT inhaler Inhale 2 puffs into the lungs 2 times daily       fluconazole (DIFLUCAN) 100 MG tablet Take 1 tablet (100 mg) by mouth daily  Qty: 39 tablet, Refills: 0    Associated Diagnoses: Abscess of left jaw      ibuprofen (ADVIL/MOTRIN) 200 MG tablet Take 200 mg by mouth every 4 hours as needed for mild pain      levothyroxine (SYNTHROID/LEVOTHROID) 112 MCG tablet Take 112 mcg by mouth daily      PARoxetine (PAXIL) 10 MG tablet Take 1 tablet by mouth daily             Allergies   Allergies   Allergen Reactions     Sulfa Drugs      Unasyn Hives     Penicillins Rash     Generalized rash  Rash, Generalized       Data   Most Recent 3 CBC's:Recent Labs   Lab Test 02/21/22  1337 02/07/22  0837 02/05/22  0717   WBC 5.9 6.4 17.4*   HGB 13.9 11.6* 11.2*   * 106* 104*    255 306      Most Recent 3 BMP's:  Recent Labs   Lab Test 02/21/22  1337 02/07/22  0837 02/05/22  0717    135 135   POTASSIUM 4.3 3.5 4.2   CHLORIDE 103 101 105    CO2 26 28 26   BUN 6* 10 10   CR 0.49* 0.55 0.51*   ANIONGAP 6 6 4   EDIN 9.9 9.1 9.2   * 111* 120*     Most Recent 2 LFT's:  Recent Labs   Lab Test 02/21/22  1337 01/11/22  0612   AST 26 16   ALT 54* 17   ALKPHOS 114 86   BILITOTAL 0.4 0.3     Most Recent INR's and Anticoagulation Dosing History:  Anticoagulation Dose History    There is no flowsheet data to display.       Most Recent 3 Troponin's:No lab results found.  Most Recent Cholesterol Panel:No lab results found.  Most Recent 6 Bacteria Isolates From Any Culture (See EPIC Reports for Culture Details):No lab results found.  Most Recent TSH, T4 and A1c Labs:No lab results found.

## 2022-02-23 NOTE — PROGRESS NOTES
RECEIVED O2 INTAKE. REVIEWED PATIENTS ACCOUNT AND SHE ALREADY HAS O2 WITH US. CALLED CARE COORDINATOR, SHAHANA BACK TO LET HER KNOW. SHE STATED THERE MIGHT BE A CHANGE IN RX BBUT WHAT WAS ORDERED IS WHAT SHE WAS ORIGINALLY ORDERED ON 2/7. PTS  IS BRINGING PT HER POC

## 2022-02-24 ENCOUNTER — PATIENT OUTREACH (OUTPATIENT)
Dept: CARE COORDINATION | Facility: CLINIC | Age: 57
End: 2022-02-24
Payer: COMMERCIAL

## 2022-02-24 DIAGNOSIS — Z71.89 OTHER SPECIFIED COUNSELING: ICD-10-CM

## 2022-02-24 NOTE — PROGRESS NOTES
Clinic Care Coordination Contact  [facility]/Voicemail       Clinical Data: Care Coordinator Outreach  Outreach attempted x 1.  Left message on patient's voicemail with call back information and requested return call.  Plan: Care Coordinator will try to reach patient again in 1-2 business days.    .Karen ARRIAZA Community Health Worker  Clinic Care Coordination  [facility]  Phone: [phone]

## 2022-02-25 NOTE — PROGRESS NOTES
Clinic Care Coordination Contact  [facility]/Voicemail       Clinical Data: Care Coordinator Outreach  Outreach attempted x 2.  Left message on patient's voicemail with call back information and requested return call.  Plan:  Care Coordinator will do no further outreaches at this time.    Karen ARRIAZA Community Health Worker  Clinic Care Coordination  [facility]  Phone: [phone]

## 2022-03-04 LAB
BACTERIA ABSC ANAEROBE+AEROBE CULT: NO GROWTH
BACTERIA ABSC ANAEROBE+AEROBE CULT: NO GROWTH

## 2022-04-02 ENCOUNTER — APPOINTMENT (OUTPATIENT)
Dept: GENERAL RADIOLOGY | Facility: CLINIC | Age: 57
End: 2022-04-02
Attending: EMERGENCY MEDICINE
Payer: COMMERCIAL

## 2022-04-02 ENCOUNTER — HOSPITAL ENCOUNTER (INPATIENT)
Facility: CLINIC | Age: 57
LOS: 1 days | Discharge: HOME | End: 2022-04-03
Attending: EMERGENCY MEDICINE | Admitting: STUDENT IN AN ORGANIZED HEALTH CARE EDUCATION/TRAINING PROGRAM
Payer: COMMERCIAL

## 2022-04-02 DIAGNOSIS — J96.01 ACUTE RESPIRATORY FAILURE WITH HYPOXIA (H): Primary | ICD-10-CM

## 2022-04-02 DIAGNOSIS — J44.1 COPD EXACERBATION (H): ICD-10-CM

## 2022-04-02 DIAGNOSIS — I10 ESSENTIAL HYPERTENSION: ICD-10-CM

## 2022-04-02 LAB
ANION GAP SERPL CALCULATED.3IONS-SCNC: 7 MMOL/L (ref 3–14)
BASE EXCESS BLDV CALC-SCNC: 0.7 MMOL/L (ref -7.7–1.9)
BASOPHILS # BLD AUTO: 0 10E3/UL (ref 0–0.2)
BASOPHILS NFR BLD AUTO: 0 %
BUN SERPL-MCNC: 8 MG/DL (ref 7–30)
CALCIUM SERPL-MCNC: 8.7 MG/DL (ref 8.5–10.1)
CHLORIDE BLD-SCNC: 101 MMOL/L (ref 94–109)
CO2 SERPL-SCNC: 22 MMOL/L (ref 20–32)
CREAT SERPL-MCNC: 0.64 MG/DL (ref 0.52–1.04)
CREAT SERPL-MCNC: 0.68 MG/DL (ref 0.52–1.04)
EOSINOPHIL # BLD AUTO: 0.3 10E3/UL (ref 0–0.7)
EOSINOPHIL NFR BLD AUTO: 5 %
ERYTHROCYTE [DISTWIDTH] IN BLOOD BY AUTOMATED COUNT: 12.4 % (ref 10–15)
FLUAV RNA SPEC QL NAA+PROBE: NEGATIVE
FLUBV RNA RESP QL NAA+PROBE: NEGATIVE
GFR SERPL CREATININE-BSD FRML MDRD: >90 ML/MIN/1.73M2
GFR SERPL CREATININE-BSD FRML MDRD: >90 ML/MIN/1.73M2
GLUCOSE BLD-MCNC: 94 MG/DL (ref 70–99)
HCO3 BLDV-SCNC: 26 MMOL/L (ref 21–28)
HCT VFR BLD AUTO: 37.1 % (ref 35–47)
HGB BLD-MCNC: 12.6 G/DL (ref 11.7–15.7)
HOLD SPECIMEN: NORMAL
IMM GRANULOCYTES # BLD: 0 10E3/UL
IMM GRANULOCYTES NFR BLD: 0 %
LYMPHOCYTES # BLD AUTO: 1.1 10E3/UL (ref 0.8–5.3)
LYMPHOCYTES NFR BLD AUTO: 19 %
MCH RBC QN AUTO: 33.9 PG (ref 26.5–33)
MCHC RBC AUTO-ENTMCNC: 34 G/DL (ref 31.5–36.5)
MCV RBC AUTO: 100 FL (ref 78–100)
MONOCYTES # BLD AUTO: 0.4 10E3/UL (ref 0–1.3)
MONOCYTES NFR BLD AUTO: 7 %
NEUTROPHILS # BLD AUTO: 3.8 10E3/UL (ref 1.6–8.3)
NEUTROPHILS NFR BLD AUTO: 69 %
NRBC # BLD AUTO: 0 10E3/UL
NRBC BLD AUTO-RTO: 0 /100
NT-PROBNP SERPL-MCNC: 309 PG/ML (ref 0–900)
O2/TOTAL GAS SETTING VFR VENT: 2 %
PCO2 BLDV: 42 MM HG (ref 40–50)
PH BLDV: 7.4 [PH] (ref 7.32–7.43)
PLATELET # BLD AUTO: 188 10E3/UL (ref 150–450)
PO2 BLDV: 58 MM HG (ref 25–47)
POTASSIUM BLD-SCNC: 4 MMOL/L (ref 3.4–5.3)
RBC # BLD AUTO: 3.72 10E6/UL (ref 3.8–5.2)
SARS-COV-2 RNA RESP QL NAA+PROBE: NEGATIVE
SODIUM SERPL-SCNC: 130 MMOL/L (ref 133–144)
TROPONIN I SERPL HS-MCNC: 6 NG/L
WBC # BLD AUTO: 5.6 10E3/UL (ref 4–11)

## 2022-04-02 PROCEDURE — 87636 SARSCOV2 & INF A&B AMP PRB: CPT | Performed by: EMERGENCY MEDICINE

## 2022-04-02 PROCEDURE — 94640 AIRWAY INHALATION TREATMENT: CPT

## 2022-04-02 PROCEDURE — 71046 X-RAY EXAM CHEST 2 VIEWS: CPT

## 2022-04-02 PROCEDURE — 80048 BASIC METABOLIC PNL TOTAL CA: CPT | Performed by: EMERGENCY MEDICINE

## 2022-04-02 PROCEDURE — 83880 ASSAY OF NATRIURETIC PEPTIDE: CPT | Performed by: EMERGENCY MEDICINE

## 2022-04-02 PROCEDURE — 36415 COLL VENOUS BLD VENIPUNCTURE: CPT | Performed by: EMERGENCY MEDICINE

## 2022-04-02 PROCEDURE — 250N000011 HC RX IP 250 OP 636: Performed by: STUDENT IN AN ORGANIZED HEALTH CARE EDUCATION/TRAINING PROGRAM

## 2022-04-02 PROCEDURE — 120N000001 HC R&B MED SURG/OB

## 2022-04-02 PROCEDURE — 99223 1ST HOSP IP/OBS HIGH 75: CPT | Mod: AI | Performed by: STUDENT IN AN ORGANIZED HEALTH CARE EDUCATION/TRAINING PROGRAM

## 2022-04-02 PROCEDURE — 94640 AIRWAY INHALATION TREATMENT: CPT | Mod: 76

## 2022-04-02 PROCEDURE — 999N000157 HC STATISTIC RCP TIME EA 10 MIN

## 2022-04-02 PROCEDURE — 250N000009 HC RX 250: Performed by: STUDENT IN AN ORGANIZED HEALTH CARE EDUCATION/TRAINING PROGRAM

## 2022-04-02 PROCEDURE — 96365 THER/PROPH/DIAG IV INF INIT: CPT

## 2022-04-02 PROCEDURE — 99285 EMERGENCY DEPT VISIT HI MDM: CPT | Mod: 25

## 2022-04-02 PROCEDURE — C9803 HOPD COVID-19 SPEC COLLECT: HCPCS

## 2022-04-02 PROCEDURE — 96375 TX/PRO/DX INJ NEW DRUG ADDON: CPT

## 2022-04-02 PROCEDURE — 84484 ASSAY OF TROPONIN QUANT: CPT | Performed by: EMERGENCY MEDICINE

## 2022-04-02 PROCEDURE — 85025 COMPLETE CBC W/AUTO DIFF WBC: CPT | Performed by: EMERGENCY MEDICINE

## 2022-04-02 PROCEDURE — 250N000013 HC RX MED GY IP 250 OP 250 PS 637: Performed by: STUDENT IN AN ORGANIZED HEALTH CARE EDUCATION/TRAINING PROGRAM

## 2022-04-02 PROCEDURE — 250N000009 HC RX 250: Performed by: EMERGENCY MEDICINE

## 2022-04-02 PROCEDURE — 82803 BLOOD GASES ANY COMBINATION: CPT | Performed by: EMERGENCY MEDICINE

## 2022-04-02 PROCEDURE — 250N000011 HC RX IP 250 OP 636: Performed by: EMERGENCY MEDICINE

## 2022-04-02 RX ORDER — IPRATROPIUM BROMIDE AND ALBUTEROL SULFATE 2.5; .5 MG/3ML; MG/3ML
3 SOLUTION RESPIRATORY (INHALATION)
Status: DISCONTINUED | OUTPATIENT
Start: 2022-04-02 | End: 2022-04-03 | Stop reason: HOSPADM

## 2022-04-02 RX ORDER — MAGNESIUM SULFATE HEPTAHYDRATE 40 MG/ML
2 INJECTION, SOLUTION INTRAVENOUS ONCE
Status: COMPLETED | OUTPATIENT
Start: 2022-04-02 | End: 2022-04-02

## 2022-04-02 RX ORDER — AMOXICILLIN 250 MG
2 CAPSULE ORAL 2 TIMES DAILY
Status: DISCONTINUED | OUTPATIENT
Start: 2022-04-02 | End: 2022-04-03 | Stop reason: HOSPADM

## 2022-04-02 RX ORDER — IPRATROPIUM BROMIDE AND ALBUTEROL SULFATE 2.5; .5 MG/3ML; MG/3ML
3 SOLUTION RESPIRATORY (INHALATION) ONCE
Status: COMPLETED | OUTPATIENT
Start: 2022-04-02 | End: 2022-04-02

## 2022-04-02 RX ORDER — ATORVASTATIN CALCIUM 20 MG/1
20 TABLET, FILM COATED ORAL DAILY
Status: DISCONTINUED | OUTPATIENT
Start: 2022-04-02 | End: 2022-04-03 | Stop reason: HOSPADM

## 2022-04-02 RX ORDER — ONDANSETRON 4 MG/1
4 TABLET, ORALLY DISINTEGRATING ORAL EVERY 6 HOURS PRN
Status: DISCONTINUED | OUTPATIENT
Start: 2022-04-02 | End: 2022-04-03 | Stop reason: HOSPADM

## 2022-04-02 RX ORDER — LIDOCAINE 40 MG/G
CREAM TOPICAL
Status: DISCONTINUED | OUTPATIENT
Start: 2022-04-02 | End: 2022-04-03 | Stop reason: HOSPADM

## 2022-04-02 RX ORDER — LEVOTHYROXINE SODIUM 112 UG/1
112 TABLET ORAL DAILY
Status: DISCONTINUED | OUTPATIENT
Start: 2022-04-02 | End: 2022-04-03 | Stop reason: HOSPADM

## 2022-04-02 RX ORDER — AMOXICILLIN 250 MG
1 CAPSULE ORAL 2 TIMES DAILY
Status: DISCONTINUED | OUTPATIENT
Start: 2022-04-02 | End: 2022-04-03 | Stop reason: HOSPADM

## 2022-04-02 RX ORDER — ALBUTEROL SULFATE 5 MG/ML
2.5 SOLUTION RESPIRATORY (INHALATION) EVERY 4 HOURS PRN
Status: DISCONTINUED | OUTPATIENT
Start: 2022-04-02 | End: 2022-04-03 | Stop reason: HOSPADM

## 2022-04-02 RX ORDER — ONDANSETRON 2 MG/ML
4 INJECTION INTRAMUSCULAR; INTRAVENOUS EVERY 6 HOURS PRN
Status: DISCONTINUED | OUTPATIENT
Start: 2022-04-02 | End: 2022-04-03 | Stop reason: HOSPADM

## 2022-04-02 RX ORDER — ATENOLOL 50 MG/1
50 TABLET ORAL DAILY
Status: ON HOLD | COMMUNITY
End: 2022-04-03

## 2022-04-02 RX ORDER — PROCHLORPERAZINE 25 MG
25 SUPPOSITORY, RECTAL RECTAL EVERY 12 HOURS PRN
Status: DISCONTINUED | OUTPATIENT
Start: 2022-04-02 | End: 2022-04-03 | Stop reason: HOSPADM

## 2022-04-02 RX ORDER — METHYLPREDNISOLONE SODIUM SUCCINATE 125 MG/2ML
125 INJECTION, POWDER, LYOPHILIZED, FOR SOLUTION INTRAMUSCULAR; INTRAVENOUS ONCE
Status: COMPLETED | OUTPATIENT
Start: 2022-04-02 | End: 2022-04-02

## 2022-04-02 RX ORDER — PAROXETINE 10 MG/1
10 TABLET, FILM COATED ORAL DAILY
Status: DISCONTINUED | OUTPATIENT
Start: 2022-04-02 | End: 2022-04-03 | Stop reason: HOSPADM

## 2022-04-02 RX ORDER — METHYLPREDNISOLONE SODIUM SUCCINATE 40 MG/ML
40 INJECTION, POWDER, LYOPHILIZED, FOR SOLUTION INTRAMUSCULAR; INTRAVENOUS EVERY 8 HOURS
Status: DISCONTINUED | OUTPATIENT
Start: 2022-04-02 | End: 2022-04-03

## 2022-04-02 RX ORDER — ACETAMINOPHEN 650 MG/1
650 SUPPOSITORY RECTAL EVERY 6 HOURS PRN
Status: DISCONTINUED | OUTPATIENT
Start: 2022-04-02 | End: 2022-04-03 | Stop reason: HOSPADM

## 2022-04-02 RX ORDER — LIDOCAINE 40 MG/G
CREAM TOPICAL
Status: DISCONTINUED | OUTPATIENT
Start: 2022-04-02 | End: 2022-04-02

## 2022-04-02 RX ORDER — ACETAMINOPHEN 325 MG/1
650 TABLET ORAL EVERY 6 HOURS PRN
Status: DISCONTINUED | OUTPATIENT
Start: 2022-04-02 | End: 2022-04-03 | Stop reason: HOSPADM

## 2022-04-02 RX ORDER — LOSARTAN POTASSIUM 25 MG/1
25 TABLET ORAL DAILY
Status: DISCONTINUED | OUTPATIENT
Start: 2022-04-02 | End: 2022-04-03 | Stop reason: HOSPADM

## 2022-04-02 RX ORDER — PROCHLORPERAZINE MALEATE 10 MG
10 TABLET ORAL EVERY 6 HOURS PRN
Status: DISCONTINUED | OUTPATIENT
Start: 2022-04-02 | End: 2022-04-03 | Stop reason: HOSPADM

## 2022-04-02 RX ADMIN — MAGNESIUM SULFATE HEPTAHYDRATE 2 G: 40 INJECTION, SOLUTION INTRAVENOUS at 05:42

## 2022-04-02 RX ADMIN — METHYLPREDNISOLONE SODIUM SUCCINATE 125 MG: 125 INJECTION, POWDER, FOR SOLUTION INTRAMUSCULAR; INTRAVENOUS at 05:41

## 2022-04-02 RX ADMIN — ATORVASTATIN CALCIUM 20 MG: 20 TABLET, FILM COATED ORAL at 14:03

## 2022-04-02 RX ADMIN — LEVOTHYROXINE SODIUM 112 MCG: 0.11 TABLET ORAL at 14:03

## 2022-04-02 RX ADMIN — IPRATROPIUM BROMIDE AND ALBUTEROL SULFATE 3 ML: 2.5; .5 SOLUTION RESPIRATORY (INHALATION) at 05:34

## 2022-04-02 RX ADMIN — IPRATROPIUM BROMIDE AND ALBUTEROL SULFATE 3 ML: 2.5; .5 SOLUTION RESPIRATORY (INHALATION) at 15:33

## 2022-04-02 RX ADMIN — LOSARTAN POTASSIUM 25 MG: 25 TABLET, FILM COATED ORAL at 14:03

## 2022-04-02 RX ADMIN — ENOXAPARIN SODIUM 40 MG: 40 INJECTION SUBCUTANEOUS at 10:54

## 2022-04-02 RX ADMIN — METHYLPREDNISOLONE SODIUM SUCCINATE 40 MG: 40 INJECTION, POWDER, FOR SOLUTION INTRAMUSCULAR; INTRAVENOUS at 22:05

## 2022-04-02 RX ADMIN — FLUTICASONE FUROATE 1 PUFF: 200 POWDER RESPIRATORY (INHALATION) at 14:54

## 2022-04-02 RX ADMIN — IPRATROPIUM BROMIDE AND ALBUTEROL SULFATE 3 ML: 2.5; .5 SOLUTION RESPIRATORY (INHALATION) at 11:34

## 2022-04-02 RX ADMIN — METHYLPREDNISOLONE SODIUM SUCCINATE 40 MG: 40 INJECTION, POWDER, FOR SOLUTION INTRAMUSCULAR; INTRAVENOUS at 14:03

## 2022-04-02 RX ADMIN — PAROXETINE HYDROCHLORIDE 10 MG: 10 TABLET, FILM COATED ORAL at 14:04

## 2022-04-02 RX ADMIN — IPRATROPIUM BROMIDE AND ALBUTEROL SULFATE 3 ML: 2.5; .5 SOLUTION RESPIRATORY (INHALATION) at 19:22

## 2022-04-02 ASSESSMENT — ACTIVITIES OF DAILY LIVING (ADL)
ADLS_ACUITY_SCORE: 3
ADLS_ACUITY_SCORE: 3
ADLS_ACUITY_SCORE: 12
ADLS_ACUITY_SCORE: 12
ADLS_ACUITY_SCORE: 3
ADLS_ACUITY_SCORE: 12
ADLS_ACUITY_SCORE: 3
ADLS_ACUITY_SCORE: 12
ADLS_ACUITY_SCORE: 3
ADLS_ACUITY_SCORE: 12

## 2022-04-02 ASSESSMENT — ENCOUNTER SYMPTOMS: SHORTNESS OF BREATH: 1

## 2022-04-02 NOTE — ED PROVIDER NOTES
History     Chief Complaint:  Shortness of Breath     HPI   Lara Melendez is a 56 year old female with history of COPD, anxiety, and hypertension who presents with shortness of breath. The patient was admitted to the hospital on 2/21-23 for COPD exacerbation and recently finished her course of steroids. She reports 2 days of worsening shortness of breath. She has tried an inhaler and nebulizer at home without relief. She is not on oxygen at home. She denies fever. She states that she quit smoking 2 months ago.    Review of Systems   Respiratory: Positive for shortness of breath.    All other systems reviewed and are negative.    Allergies:  Sulfa Drugs  Penicillins    Medications:  Albuterol  Atenolol  Lipitor  Synthroid  Paxil    Past Medical History:     Anxiety  Hypertension  Hypothyroidism  COPD  Pulmonary nodule   Hypercholesterolemia     Past Surgical History:    Cholecystectomy  Incision and drainage  Tooth extraction     Social History:  Patient presents alone    Physical Exam     Patient Vitals for the past 24 hrs:   BP Temp Temp src Pulse Resp SpO2   04/02/22 0635 143/92 -- -- 102 22 93 %   04/02/22 0505 206/116 98.2  F (36.8  C) Oral 110 24 96 %     Physical Exam  Nursing note and vitals reviewed.  Constitutional: Cooperative.   HENT:   Mouth/Throat: Mucous membranes are normal.    Cardiovascular: Normal rate, regular rhythm and normal heart sounds.  No murmur.  Pulmonary/Chest: Effort normal and breath sounds normal. No respiratory distress.  No rales. diffuse expiratory wheezes.  Abdominal: Soft. Normal appearance and bowel sounds are normal. No distension. There is no tenderness.   Musculoskeletal: No LE edema.   Neurological: Alert. Oriented x4.   Skin: Skin is warm and dry.   Psychiatric: Normal mood and affect.     Emergency Department Course     Imaging:  CXR: pending    Laboratory:  Labs Ordered and Resulted from Time of ED Arrival to Time of ED Departure   BASIC METABOLIC PANEL - Abnormal        Result Value    Sodium 130 (*)     Potassium 4.0      Chloride 101      Carbon Dioxide (CO2) 22      Anion Gap 7      Urea Nitrogen 8      Creatinine 0.64      Calcium 8.7      Glucose 94      GFR Estimate >90     BLOOD GAS VENOUS - Abnormal    pH Venous 7.40      pCO2 Venous 42      pO2 Venous 58 (*)     Bicarbonate Venous 26      Base Excess/Deficit (+/-) 0.7      FIO2 2     CBC WITH PLATELETS AND DIFFERENTIAL - Abnormal    WBC Count 5.6      RBC Count 3.72 (*)     Hemoglobin 12.6      Hematocrit 37.1            MCH 33.9 (*)     MCHC 34.0      RDW 12.4      Platelet Count 188      % Neutrophils 69      % Lymphocytes 19      % Monocytes 7      % Eosinophils 5      % Basophils 0      % Immature Granulocytes 0      NRBCs per 100 WBC 0      Absolute Neutrophils 3.8      Absolute Lymphocytes 1.1      Absolute Monocytes 0.4      Absolute Eosinophils 0.3      Absolute Basophils 0.0      Absolute Immature Granulocytes 0.0      Absolute NRBCs 0.0     TROPONIN I - Normal    Troponin I High Sensitivity 6     NT PROBNP INPATIENT - Normal    N terminal Pro BNP Inpatient 309     INFLUENZA A/B & SARS-COV2 PCR MULTIPLEX - Normal    Influenza A PCR Negative      Influenza B PCR Negative      SARS CoV2 PCR Negative         Reviewed:  I reviewed nursing notes, vitals, past medical history and Care Everywhere    Assessments:  0521 I obtained history and examined the patient as noted above.   0644 I rechecked the patient and explained findings. Patient feels improved. Oxygen turned off  0705    Patient SOB and requesting oxygen replaced.     Interventions:  0534 Duoneb 3 ml   0541 Solu-Medrol 125 mg IV  0542 Magnesium sulfate 2 g IV    Disposition:  The patient was admitted.     Impression & Plan     Medical Decision Making:  Lara Melendez is a 56 year old female with a history of COPD who presents for evaluation of shortness of breath.   Signs and symptoms are consistent with COPD exacerbation.  A broad differential was  considered including foreign body, COPD, viral induced reactive airway disease, pneumothorax, cardiac equivalent, allergic phenomena, pneumonia, bronchitis, etc.  Patient feels improved after interventions here in ED but still on 2L NC oxygen for comfort.  Given recent admission and corticosteroid treatment, would benefit from admission for additional respiratory care.           Diagnosis:    ICD-10-CM    1. COPD exacerbation (H)  J44.1        Scribe Disclosure:  I, Raissa Jay, am serving as a scribe at 5:09 AM on 4/2/2022 to document services personally performed by Kody Smart MD based on my observations and the provider's statements to me.          Kody Smart MD  04/02/22 0731

## 2022-04-02 NOTE — ED TRIAGE NOTES
Pt states worsening dyspnea this morning. Pt states currently being evaluated for potential COPD. Pt is on home oxygen oxygen at 2L/min, pt states does not normally require oxygen. GCS 15

## 2022-04-02 NOTE — PHARMACY-ADMISSION MEDICATION HISTORY
Admission medication history interview status for this patient is complete. See [facility] admission navigator for allergy information, prior to admission medications and immunization status.     Medication history interview done, indicate source(s): Patient  Medication history resources (including written lists, pill bottles, clinic record): care everywhere  Pharmacy: [facility]    Changes made to PTA medication list:  Added: nothing  Changed: increased atenolol (25 mg-->50 mg daily)  Reported as Not Taking: nothing  Removed: nothing    Actions taken by pharmacist (provider contacted, etc):None     Additional medication history information:None    Medication reconciliation/reorder completed by provider prior to medication history?  Y   (Y/N)     Prior to Admission medications    Medication Sig Last Dose Taking? Auth Provider   albuterol (PROAIR HFA/PROVENTIL HFA/VENTOLIN HFA) 108 (90 Base) MCG/ACT inhaler Inhale 2 puffs into the lungs every 4 hours as needed for shortness of breath / dyspnea or wheezing Inhale 1-2 Puffs every 4 hours as needed for Wheezing. 4/2/2022 at am Yes Tio Kessler MD   albuterol (PROVENTIL) (2.5 MG/3ML) 0.083% neb solution Take 1 vial (2.5 mg) by nebulization every 4 hours as needed for shortness of breath / dyspnea or wheezing 4/1/2022 at 2300 Yes Ceci Bowen MD   atenolol (TENORMIN) 50 MG tablet Take 50 mg by mouth daily 4/1/2022 at Unknown time Yes Unknown, Entered By History   atorvastatin (LIPITOR) 20 MG tablet Take 20 mg by mouth daily 4/1/2022 at Unknown time Yes Reported, Patient   FLOVENT  MCG/ACT inhaler Inhale 2 puffs into the lungs 2 times daily  4/1/2022 at Unknown time Yes Reported, Patient   levothyroxine (SYNTHROID/LEVOTHROID) 112 MCG tablet Take 112 mcg by mouth daily 4/1/2022 at Unknown time Yes Reported, Patient   PARoxetine (PAXIL) 10 MG tablet Take 1 tablet by mouth daily 4/1/2022 at Unknown time Yes Reported, Patient

## 2022-04-02 NOTE — PLAN OF CARE
Goal Outcome Evaluation:    Assumed care from 2532-1834: VSS. Pt on 1L O2 with sats in low to mid 90s. Pt prefers to use the O2 d/t some BLUE. Breathing improved, per pt report. A&Ox4. Denies pain. LS w/ fine crackles in lower lobes, more significant on left. Denies CP. Up ad donald. PIV SL. Pt makes needs known. Napped this afternoon. Continuing with steroids, nebs, inhalers.

## 2022-04-02 NOTE — ED NOTES
[facility]  ED Nurse Handoff Report    Lara Melendez is a 56 year old female   ED Chief complaint: Shortness of Breath  . ED Diagnosis:   Final diagnoses:   COPD exacerbation (H)     Allergies:   Allergies   Allergen Reactions     Sulfa Drugs      Penicillins Rash     Generalized rash  Rash, Generalized         Code Status: Full Code  Activity level - Baseline/Home:  Independent. Activity Level - Current:   Independent. Lift room needed: No. Bariatric: No   Needed: No   Isolation: No. Infection: Not Applicable.     Vital Signs:   Vitals:    04/02/22 0645 04/02/22 0650 04/02/22 0655 04/02/22 0700   BP:       Pulse:       Resp:       Temp:       TempSrc:       SpO2: 99% 95% 95% 91%       Cardiac Rhythm:  ,      Pain level:    Patient confused: No. Patient Falls Risk: No.   Elimination Status: Has voided   Patient Report - Initial Complaint: 56 year old female with history of COPD, anxiety, and hypertension who presents with shortness of breath. The patient was admitted to the hospital on 2/21-23 for COPD exacerbation and recently finished her course of steroids. She reports 2 days of worsening shortness of breath. She has tried an inhaler and nebulizer at home without relief. She is not on oxygen at home. She denies fever. She states that she quit smoking 2 months ago.. Focused Assessment: Dyspneic on exertion, insp and exp wheeze. Better after neb.    Tests Performed:   Labs Ordered and Resulted from Time of ED Arrival to Time of ED Departure   BASIC METABOLIC PANEL - Abnormal       Result Value    Sodium 130 (*)     Potassium 4.0      Chloride 101      Carbon Dioxide (CO2) 22      Anion Gap 7      Urea Nitrogen 8      Creatinine 0.64      Calcium 8.7      Glucose 94      GFR Estimate >90     BLOOD GAS VENOUS - Abnormal    pH Venous 7.40      pCO2 Venous 42      pO2 Venous 58 (*)     Bicarbonate Venous 26      Base Excess/Deficit (+/-) 0.7      FIO2 2     CBC WITH PLATELETS AND DIFFERENTIAL  - Abnormal    WBC Count 5.6      RBC Count 3.72 (*)     Hemoglobin 12.6      Hematocrit 37.1            MCH 33.9 (*)     MCHC 34.0      RDW 12.4      Platelet Count 188      % Neutrophils 69      % Lymphocytes 19      % Monocytes 7      % Eosinophils 5      % Basophils 0      % Immature Granulocytes 0      NRBCs per 100 WBC 0      Absolute Neutrophils 3.8      Absolute Lymphocytes 1.1      Absolute Monocytes 0.4      Absolute Eosinophils 0.3      Absolute Basophils 0.0      Absolute Immature Granulocytes 0.0      Absolute NRBCs 0.0     TROPONIN I - Normal    Troponin I High Sensitivity 6     NT PROBNP INPATIENT - Normal    N terminal Pro BNP Inpatient 309     INFLUENZA A/B & SARS-COV2 PCR MULTIPLEX - Normal    Influenza A PCR Negative      Influenza B PCR Negative      SARS CoV2 PCR Negative       . Abnormal Results:   XR Chest 2 Views    (Results Pending)     .   Treatments provided: see mar and notes  Family Comments: n/a  OBS brochure/video discussed/provided to patient:  Yes  ED Medications:   Medications   lidocaine 1 % 0.1-1 mL (has no administration in time range)   lidocaine (LMX4) cream (has no administration in time range)   sodium chloride (PF) 0.9% PF flush 3 mL (has no administration in time range)   sodium chloride (PF) 0.9% PF flush 3 mL (has no administration in time range)   methylPREDNISolone sodium succinate (solu-MEDROL) injection 125 mg (125 mg Intravenous Given 4/2/22 9641)   magnesium sulfate 2 g in water intermittent infusion (0 g Intravenous Stopped 4/2/22 0074)   ipratropium - albuterol 0.5 mg/2.5 mg/3 mL (DUONEB) neb solution 3 mL (3 mLs Nebulization Given 4/2/22 5731)     Drips infusing:  No  For the majority of the shift, the patient's behavior Green. Interventions performed were n/a.    Sepsis treatment initiated: No     Patient tested for COVID 19 prior to admission: YES - negative    ED Nurse Name/Phone Number: Debra Hernandes RN,   7:01 AM  RECEIVING UNIT ED HANDOFF  REVIEW    Above ED Nurse Handoff Report was reviewed:Yes  Reviewed by: Celeste Tang RN on April 2, 2022 at 8:43 AM

## 2022-04-02 NOTE — PLAN OF CARE
9:10  Pt arrived to [facility] from ED.  Assisted into bed and oriented to room.  Continue plan of care.    9:20 Spoke with MD in person.  Pt still needs diet order.  MD said to place a regular diet.    13:30  Pt had c/o headache earlier this shift.  She did not want any medications for it.  Alert and oriented.  Ambulates independently in room.  Steady gait.  Lungs BLUE, diminished.  Coarse with exp wheezes.  94% 2L O2. Does not usually use oxygen at baseline.  No tele.  Regular diet.  Voiding without difficulty.  PIV saline locked. Continue solumedrol, scheduled nebs, lovenox per orders.

## 2022-04-02 NOTE — H&P
[facility]    History and Physical - Hospitalist Service       Date of Admission:  4/2/2022    Assessment & Plan      Lara Melendez is a 56 year old female admitted on 4/2/2022.     She has history of COPD who started having wheezing with dyspnea on exertion starting yesterday.  She has mild nonproductive cough.  No fever or chills and no chest pain.  She has been compliant with her medications.  She has oxygen at home but does not use it regularly.  Upon arrival to ER she was short of breath and wheezing with blood pressure of 178/96 and oxygen saturation of 92% on 2 L of oxygen.  She tested negative for influenza A/B and COVID-19.  Chest x-ray looks clear.    She was recently admitted from 2/21-2/23 with COPD exacerbation.  She also had I&D's on 1/10/2022 for left mandibular abscess and repeat I&D on 4/2/2022 for left lateral pharyngeal space infection needing debridement of left mandible and drain placement and finished 6 weeks of clindamycin for suspected osteomyelitis.    Plan    1. COPD exacerbation..      We will start on scheduled duo nebs, as needed albuterol and IV Solu-Medrol 40 mg IV 3 times daily.  Transition to p.o. steroids tomorrow if breathing continues to improve.    Patient takes Flovent inhaler upon discharge, will need to add Spiriva or Incruse Ellipta upon discharge.    Chest x-ray appears clear, okay to hold off on antibiotics.    Patient has already stopped smoking in February and was commended for that.  2. Essential hypertension.    Patient is on atenolol at home, replace it with losartan as beta-blockers can cause bronchospasm.  3. Hypothyroidism.  Continue Synthroid.  4. Dyslipidemia.  Continue Lipitor.  5. Depression.  Continue Paxil.  6. Hyponatremia.  Sodium 130, likely due to underlying lung disease.  Monitor.           Diet:  Regular  DVT Prophylaxis: Enoxaparin (Lovenox) SQ  Hines Catheter: Not present  Central Lines: None  Cardiac Monitoring: None  Code  Status:  Full code    Clinically Significant Risk Factors Present on Admission         # Hyponatremia: Na = 130 mmol/L (Ref range: 133 - 144 mmol/L) on admission, will monitor as appropriate              Disposition Plan   Expected Discharge:    Anticipated discharge location:  Awaiting care coordination huddle  Delays:            The patient's care was discussed with the Patient.    Mateo Klein MD  Hospitalist Service  [facility]  Securely message with the Vocera Web Console (learn more here)  Text page via [facility] Paging/Directory         ______________________________________________________________________    Chief Complaint   Shortness of breath    History is obtained from the patient    History of Present Illness   She has history of COPD who started having wheezing with dyspnea on exertion starting yesterday.  She has mild nonproductive cough.  No fever or chills and no chest pain.  She has been compliant with her medications.  She has oxygen at home but does not use it regularly.  Upon arrival to ER she was short of breath and wheezing with blood pressure of 178/96 and oxygen saturation of 92% on 2 L of oxygen.    She was recently admitted from 2/21-2/23 with COPD exacerbation.  She also had I&D's on 1/10/2022 for left mandibular abscess and repeat I&D on 4/2/2022 for left lateral pharyngeal space infection needing debridement of left mandible and drain placement and finished 6 weeks of clindamycin for suspected osteomyelitis.    Review of Systems    The 10 point Review of Systems is negative other than noted in the HPI or here.     Past Medical History    I have reviewed this patient's medical history and updated it with pertinent information if needed.   Past Medical History:   Diagnosis Date     Anxiety      COPD (chronic obstructive pulmonary disease)      Hypercholesterolemia      Hypertension      Hypothyroidism        Past Surgical History   I have reviewed this patient's  surgical history and updated it with pertinent information if needed.  Past Surgical History:   Procedure Laterality Date     CHOLECYSTECTOMY       INCISION AND DRAINAGE MANDIBLE, COMBINED Left 01/10/2022    Procedure: INCISION AND DRAINAGE, MANDIBLE;  Surgeon: Jn Feliz DDS;  Location: [facility] OR     INCISION AND DRAINAGE MANDIBLE, COMBINED Left 02/04/2022    Procedure: INCISION AND DRAINAGE, MANDIBLE;  Surgeon: Taylor Ortez DDS;  Location: [facility] OR     TOOTH EXTRACTION         Social History   I have reviewed this patient's social history and updated it with pertinent information if needed.  Social History     Tobacco Use     Smoking status: Current Every Day Smoker     Packs/day: 1.00     Smokeless tobacco: Never Used   Substance Use Topics     Alcohol use: Yes     Comment: occ     Drug use: Never       Family History     Father had stomach cancer.    Prior to Admission Medications   Prior to Admission Medications   Prescriptions Last Dose Informant Patient Reported? Taking?   FLOVENT  MCG/ACT inhaler  Self Yes No   Sig: Inhale 2 puffs into the lungs 2 times daily    PARoxetine (PAXIL) 10 MG tablet  Self Yes No   Sig: Take 1 tablet by mouth daily   albuterol (PROAIR HFA/PROVENTIL HFA/VENTOLIN HFA) 108 (90 Base) MCG/ACT inhaler   No No   Sig: Inhale 2 puffs into the lungs every 4 hours as needed for shortness of breath / dyspnea or wheezing Inhale 1-2 Puffs every 4 hours as needed for Wheezing.   albuterol (PROVENTIL) (2.5 MG/3ML) 0.083% neb solution   No No   Sig: Take 1 vial (2.5 mg) by nebulization every 4 hours as needed for shortness of breath / dyspnea or wheezing   atenolol (TENORMIN) 50 MG tablet  Self Yes No   Sig: Take 25 mg by mouth daily    atorvastatin (LIPITOR) 20 MG tablet  Self Yes No   Sig: Take 20 mg by mouth daily   levothyroxine (SYNTHROID/LEVOTHROID) 112 MCG tablet  Self Yes No   Sig: Take 112 mcg by mouth daily      Facility-Administered Medications: None     Allergies   Allergies    Allergen Reactions     Sulfa Drugs      Penicillins Rash     Generalized rash  Rash, Generalized         Physical Exam   Vital Signs: Temp: 98.2  F (36.8  C) Temp src: Oral BP: (!) 176/98 Pulse: 109   Resp: 22 SpO2: 93 % O2 Device: Nasal cannula Oxygen Delivery: 2 LPM  Weight: 0 lbs 0 oz    General Appearance: Appears comfortable.  Eyes: No icterus  HEENT: Moist mucosa  Respiratory: Bilateral wheezing  Cardiovascular: S1 is normal  GI: Nontender nondistended  Lymph/Hematologic: Not examined  Genitourinary: Not examined  Skin: No rash  Musculoskeletal: Normal tone  Neurologic: Nonfocal  Psychiatric: Normal mood    Data   Data reviewed today: I reviewed all medications, new labs and imaging results over the last 24 hours. I personally reviewed the chest x-ray image(s) showing Normal study.    Recent Labs   Lab 04/02/22  0621 04/02/22  0535   WBC 5.6  --    HGB 12.6  --      --      --    NA  --  130*   POTASSIUM  --  4.0   CHLORIDE  --  101   CO2  --  22   BUN  --  8   CR  --  0.68  0.64   ANIONGAP  --  7   EDIN  --  8.7   GLC  --  94     Most Recent 3 CBC's:Recent Labs   Lab Test 04/02/22  0621 02/21/22  1337 02/07/22  0837   WBC 5.6 5.9 6.4   HGB 12.6 13.9 11.6*    102* 106*    329 255     Most Recent 3 BMP's:Recent Labs   Lab Test 04/02/22  0535 02/21/22  1337 02/07/22  0837   * 135 135   POTASSIUM 4.0 4.3 3.5   CHLORIDE 101 103 101   CO2 22 26 28   BUN 8 6* 10   CR 0.68  0.64 0.49* 0.55   ANIONGAP 7 6 6   EDIN 8.7 9.9 9.1   GLC 94 125* 111*     Most Recent 2 LFT's:Recent Labs   Lab Test 02/21/22  1337 01/11/22  0612   AST 26 16   ALT 54* 17   ALKPHOS 114 86   BILITOTAL 0.4 0.3

## 2022-04-03 VITALS
HEART RATE: 82 BPM | DIASTOLIC BLOOD PRESSURE: 81 MMHG | BODY MASS INDEX: 22.16 KG/M2 | OXYGEN SATURATION: 95 % | RESPIRATION RATE: 18 BRPM | HEIGHT: 64 IN | TEMPERATURE: 98.4 F | WEIGHT: 129.8 LBS | SYSTOLIC BLOOD PRESSURE: 119 MMHG

## 2022-04-03 LAB
ANION GAP SERPL CALCULATED.3IONS-SCNC: 5 MMOL/L (ref 3–14)
BUN SERPL-MCNC: 11 MG/DL (ref 7–30)
CALCIUM SERPL-MCNC: 9.4 MG/DL (ref 8.5–10.1)
CHLORIDE BLD-SCNC: 105 MMOL/L (ref 94–109)
CO2 SERPL-SCNC: 26 MMOL/L (ref 20–32)
CREAT SERPL-MCNC: 0.59 MG/DL (ref 0.52–1.04)
ERYTHROCYTE [DISTWIDTH] IN BLOOD BY AUTOMATED COUNT: 12.9 % (ref 10–15)
GFR SERPL CREATININE-BSD FRML MDRD: >90 ML/MIN/1.73M2
GLUCOSE BLD-MCNC: 131 MG/DL (ref 70–99)
HCT VFR BLD AUTO: 38.5 % (ref 35–47)
HGB BLD-MCNC: 13.1 G/DL (ref 11.7–15.7)
MCH RBC QN AUTO: 34.2 PG (ref 26.5–33)
MCHC RBC AUTO-ENTMCNC: 34 G/DL (ref 31.5–36.5)
MCV RBC AUTO: 101 FL (ref 78–100)
PLATELET # BLD AUTO: 192 10E3/UL (ref 150–450)
POTASSIUM BLD-SCNC: 4.1 MMOL/L (ref 3.4–5.3)
RBC # BLD AUTO: 3.83 10E6/UL (ref 3.8–5.2)
SODIUM SERPL-SCNC: 136 MMOL/L (ref 133–144)
WBC # BLD AUTO: 6.8 10E3/UL (ref 4–11)

## 2022-04-03 PROCEDURE — 94640 AIRWAY INHALATION TREATMENT: CPT | Mod: 76

## 2022-04-03 PROCEDURE — 94640 AIRWAY INHALATION TREATMENT: CPT

## 2022-04-03 PROCEDURE — 85027 COMPLETE CBC AUTOMATED: CPT | Performed by: STUDENT IN AN ORGANIZED HEALTH CARE EDUCATION/TRAINING PROGRAM

## 2022-04-03 PROCEDURE — 250N000013 HC RX MED GY IP 250 OP 250 PS 637: Performed by: STUDENT IN AN ORGANIZED HEALTH CARE EDUCATION/TRAINING PROGRAM

## 2022-04-03 PROCEDURE — 80048 BASIC METABOLIC PNL TOTAL CA: CPT | Performed by: STUDENT IN AN ORGANIZED HEALTH CARE EDUCATION/TRAINING PROGRAM

## 2022-04-03 PROCEDURE — 99239 HOSP IP/OBS DSCHRG MGMT >30: CPT | Performed by: HOSPITALIST

## 2022-04-03 PROCEDURE — 250N000012 HC RX MED GY IP 250 OP 636 PS 637: Performed by: HOSPITALIST

## 2022-04-03 PROCEDURE — 250N000009 HC RX 250: Performed by: STUDENT IN AN ORGANIZED HEALTH CARE EDUCATION/TRAINING PROGRAM

## 2022-04-03 PROCEDURE — 999N000157 HC STATISTIC RCP TIME EA 10 MIN

## 2022-04-03 PROCEDURE — 36415 COLL VENOUS BLD VENIPUNCTURE: CPT | Performed by: STUDENT IN AN ORGANIZED HEALTH CARE EDUCATION/TRAINING PROGRAM

## 2022-04-03 PROCEDURE — 250N000011 HC RX IP 250 OP 636: Performed by: STUDENT IN AN ORGANIZED HEALTH CARE EDUCATION/TRAINING PROGRAM

## 2022-04-03 RX ORDER — LOSARTAN POTASSIUM 25 MG/1
25 TABLET ORAL DAILY
Qty: 30 TABLET | Refills: 1 | Status: ON HOLD | OUTPATIENT
Start: 2022-04-04 | End: 2023-07-18

## 2022-04-03 RX ORDER — ALBUTEROL SULFATE 0.83 MG/ML
2.5 SOLUTION RESPIRATORY (INHALATION) EVERY 4 HOURS PRN
Qty: 30 ML | Refills: 0 | Status: ON HOLD | OUTPATIENT
Start: 2022-04-03 | End: 2022-05-17

## 2022-04-03 RX ORDER — PREDNISONE 20 MG/1
60 TABLET ORAL DAILY
Status: DISCONTINUED | OUTPATIENT
Start: 2022-04-03 | End: 2022-04-03 | Stop reason: HOSPADM

## 2022-04-03 RX ORDER — PREDNISONE 20 MG/1
TABLET ORAL
Qty: 20 TABLET | Refills: 0 | Status: SHIPPED | OUTPATIENT
Start: 2022-04-03 | End: 2022-05-14

## 2022-04-03 RX ADMIN — PREDNISONE 60 MG: 20 TABLET ORAL at 10:12

## 2022-04-03 RX ADMIN — PAROXETINE HYDROCHLORIDE 10 MG: 10 TABLET, FILM COATED ORAL at 08:22

## 2022-04-03 RX ADMIN — IPRATROPIUM BROMIDE AND ALBUTEROL SULFATE 3 ML: 2.5; .5 SOLUTION RESPIRATORY (INHALATION) at 07:58

## 2022-04-03 RX ADMIN — METHYLPREDNISOLONE SODIUM SUCCINATE 40 MG: 40 INJECTION, POWDER, FOR SOLUTION INTRAMUSCULAR; INTRAVENOUS at 05:33

## 2022-04-03 RX ADMIN — IPRATROPIUM BROMIDE AND ALBUTEROL SULFATE 3 ML: 2.5; .5 SOLUTION RESPIRATORY (INHALATION) at 12:08

## 2022-04-03 RX ADMIN — LEVOTHYROXINE SODIUM 112 MCG: 0.11 TABLET ORAL at 08:22

## 2022-04-03 RX ADMIN — FLUTICASONE FUROATE 1 PUFF: 200 POWDER RESPIRATORY (INHALATION) at 07:58

## 2022-04-03 RX ADMIN — LOSARTAN POTASSIUM 25 MG: 25 TABLET, FILM COATED ORAL at 08:22

## 2022-04-03 RX ADMIN — ATORVASTATIN CALCIUM 20 MG: 20 TABLET, FILM COATED ORAL at 08:22

## 2022-04-03 RX ADMIN — ENOXAPARIN SODIUM 40 MG: 40 INJECTION SUBCUTANEOUS at 08:31

## 2022-04-03 ASSESSMENT — ACTIVITIES OF DAILY LIVING (ADL)
ADLS_ACUITY_SCORE: 3

## 2022-04-03 NOTE — PLAN OF CARE
No c/o pain this shift. Alert and oriented.  Ambulates independently in room.  Steady gait.  Lungs BLUE, diminished.  Coarse with exp wheezes.  93% RA. No tele.  Regular diet.  Voiding without difficulty.  PIV saline locked. Continue prednisone, scheduled nebs, lovenox per orders. Pt will discharge today    12:30  Pt discharged home at this time accompanied by .  Pt verbalizes understanding of discharge instructions and medications.  Pt acknowledges receipt of all belongings.

## 2022-04-03 NOTE — PLAN OF CARE
Goal Outcome Evaluation:    Plan of Care Reviewed With: patient     Pt. A&Ox3, up independently, lung sounds diminished with exp. Wheezes, and crackles to bases, O2 at 1L with sat's at 93%. Plan: encourage deep breathing, I.S. pt. Continues on steroids, nebs and inhalers. Pt. Denies any needs at this time. Will continue with POC.

## 2022-04-03 NOTE — CONSULTS
Care Management Discharge Note    Discharge Date: 04/03/2022       Discharge Disposition: home with spouse     Discharge Services:  [facility]    Discharge DME:  O2    Discharge Transportation: spouse       Additional Information:  Pt admitted with COPD exacerbation, noted to have unplanned readmission risk of 20%. Patient has improved with her breathing and has orders to discharge home today. Met with patient and spouse at bedside to discuss plan of care. She states she does not have any home services. She has a nebulizer at home uses home O2 at night only which is supplied through [facility]. She is independent in the room. She denies any needs or concerns on discharge. Offered to assist with follow up appts and patient politely declined. She follows with Chyna Rust at [facility] and just saw her last week. She states she had a hospital follow up from her last stay and need more nebulizer meds. She is getting another refill for her nebulizer from hospital discharge. She is going to make follow up with Pulmonology on Monday. No needs identified.           Chyna Christian RN BSN CM  Inpatient Care Coordination  [facility]  [phone]

## 2022-04-03 NOTE — DISCHARGE SUMMARY
Hospitalist Discharge Summary  [facility]    Lara Melendez MRN# [id number]   YOB: 1965 Age: 56 year old     Date of Admission:  4/2/2022  Date of Discharge:  4/3/2022  Admitting Physician:  Mateo Klein MD  Discharge Physician:  Kody Prieto DO  Discharging Service:  Hospitalist     Primary Provider: Chyna Rust          Discharge Diagnosis:     Acute hypoxic respiratory failure  Acute exacerbation of COPD  Hypertension  Hyponatremia  Hypothyroidism  Dyslipidemia  Depression             Discharge Disposition:     Discharged to home           Allergies:     Allergies   Allergen Reactions     Sulfa Drugs      Penicillins Rash     Generalized rash  Rash, Generalized                Discharge Medications:     Current Discharge Medication List      START taking these medications    Details   losartan (COZAAR) 25 MG tablet Take 1 tablet (25 mg) by mouth daily  Qty: 30 tablet, Refills: 1    Associated Diagnoses: Essential hypertension      predniSONE (DELTASONE) 20 MG tablet Take 3 tabs by mouth daily x 3 days, then 2 tabs daily x 3 days, then 1 tab daily x 3 days, then 1/2 tab daily x 3 days.  Qty: 20 tablet, Refills: 0    Associated Diagnoses: Acute respiratory failure with hypoxia (H)         CONTINUE these medications which have CHANGED    Details   albuterol (PROVENTIL) (2.5 MG/3ML) 0.083% neb solution Take 1 vial (2.5 mg) by nebulization every 4 hours as needed for shortness of breath / dyspnea or wheezing  Qty: 30 mL, Refills: 0    Associated Diagnoses: COPD exacerbation (H)         CONTINUE these medications which have NOT CHANGED    Details   albuterol (PROAIR HFA/PROVENTIL HFA/VENTOLIN HFA) 108 (90 Base) MCG/ACT inhaler Inhale 2 puffs into the lungs every 4 hours as needed for shortness of breath / dyspnea or wheezing Inhale 1-2 Puffs every 4 hours as needed for Wheezing.  Qty: 18 g, Refills: 0    Comments: Pharmacy may dispense brand covered by insurance  "(Proair, or proventil or ventolin or generic albuterol inhaler)  Associated Diagnoses: Chronic obstructive pulmonary disease, unspecified COPD type (H)      atorvastatin (LIPITOR) 20 MG tablet Take 20 mg by mouth daily      FLOVENT  MCG/ACT inhaler Inhale 2 puffs into the lungs 2 times daily       levothyroxine (SYNTHROID/LEVOTHROID) 112 MCG tablet Take 112 mcg by mouth daily      PARoxetine (PAXIL) 10 MG tablet Take 1 tablet by mouth daily                    Condition on Discharge:     Discharge condition: Stable   Discharge vitals: Blood pressure 119/81, pulse 83, temperature 98.4  F (36.9  C), temperature source Oral, resp. rate 18, height 1.626 m (5' 4\"), weight 58.9 kg (129 lb 12.8 oz), SpO2 94 %.   Code status on discharge: Full Code      BASIC PHYSICAL EXAMINATION:  GENERAL: No apparent distress.  CARDIOVASCULAR: Regular rate and rhythm without murmurs.  PULMONARY: Clear to auscultation bilaterally.   GASTROINTESTINAL: Abdomen soft, non-tender.  EXTREMITIES: No edema, pulses intact.  NEUROLOGIC: No focal deficits.            History of Illness:   See detailed admission note for full details.               Procedures excluding imaging which is summarized below:     Please see details in the electronic medical record.           Consultations:     None          Significant Results:     Results for orders placed or performed during the hospital encounter of 04/02/22   XR Chest 2 Views    Narrative    EXAM: XR CHEST 2 VIEW  LOCATION: [facility]  DATE/TIME: 04/02/2022, 7:21 AM    INDICATION: Shortness of breath. COPD.  COMPARISON: 02/21/2022.      Impression    IMPRESSION: Negative chest.           Transthoracic Echocardiogram Results:  No results found for this or any previous visit (from the past 4320 hour(s)).             Pending Results:     Unresulted Labs Ordered in the Past 30 Days of this Admission     No orders found for last 31 day(s).                      Discharge " Instructions and Follow-Up:     Discharge instructions and follow-up:   Discharge Procedure Orders   Follow-up and recommended labs and tests    Order Comments: Follow up with primary care provider, Chyna Rust, within 7 days to evaluate medication change and for hospital follow- up.  The following labs/tests are recommended: BMP in 1 week.  Continue atenolol and start losartan.  He will need labs in 1 week.  Follow-up with pulmonology.     Activity   Order Comments: Your activity upon discharge: activity as tolerated     Order Specific Question Answer Comments   Is discharge order? Yes      Full Code     Order Specific Question Answer Comments   Code status determined by: Discussion with patient/ legal decision maker      Diet   Order Comments: Follow this diet upon discharge: Orders Placed This Encounter      Regular Diet Adult     Order Specific Question Answer Comments   Is discharge order? Yes              Hospital Course:     Lara Melendez is a 56 year old female with a history of COPD, hypertension, depression, hypothyroidism, and hyperlipidemia who was admitted on 4/2/2022 with shortness of breath and wheezing.  She has history of COPD who started having wheezing with dyspnea on exertion starting 2 days ago.  She has mild nonproductive cough.  No fever or chills and no chest pain.  She has been compliant with her medications.  She has oxygen at home but does not use it regularly.      Upon arrival to ER she was short of breath and wheezing with blood pressure of 178/96 and oxygen saturation of 92% on 2 L of oxygen.  She tested negative for influenza A/B and COVID-19.  Chest x-ray is clear without evidence of pneumonia.  She was recently admitted from 2/21-2/23 with COPD exacerbation.  She also had I&D's on 1/10/2022 for left mandibular abscess and repeat I&D on 4/2/2022 for left lateral pharyngeal space infection needing debridement of left mandible and drain placement and finished 6 weeks of  clindamycin for suspected osteomyelitis.    Yesterday she was admitted to the hospital on supplemental oxygen, IV steroids, and scheduled nebulizers.  This morning she is on room air and doing well.  She feels dramatically better.  She will be transition from IV Solu-Medrol to oral prednisone and completed taper as outlined above.  Her nebulizer solution will be refilled.  She is currently in the process of scheduling a pulmonology evaluation in the clinic.  To note, her prior to admission atenolol was discontinued as this can cause bronchospasm and this will be replaced with losartan.  She will need follow-up BMP in 1 week.     The patient was seen, examined, and counseled on this day. The hospitalization and plan of care was reviewed with the patient extensively. All questions were addressed and the patient agreed to follow-up as noted above.      Total time spent in face to face contact with the patient and coordinating discharge was:  35 Minutes    Kody Prieto DO MPH  [facility] Hospitalist  [address] [place] [address]  04/03/2022

## 2022-04-04 ENCOUNTER — PATIENT OUTREACH (OUTPATIENT)
Dept: CARE COORDINATION | Facility: CLINIC | Age: 57
End: 2022-04-04
Payer: COMMERCIAL

## 2022-04-04 DIAGNOSIS — Z71.89 OTHER SPECIFIED COUNSELING: ICD-10-CM

## 2022-04-04 NOTE — PROGRESS NOTES
Clinic Care Coordination Contact  [facility]/Voicemail       Clinical Data: Care Coordinator Outreach  Outreach attempted x 1.  Left message on patient's voicemail with call back information and requested return call.  Plan:Care Coordinator will try to reach patient again in 1-2 business days.    Anel Hinojosa  Care Transitions Assistant  [facility]

## 2022-04-05 NOTE — PROGRESS NOTES
Clinic Care Coordination Contact  [facility]/Voicemail       Clinical Data: Care Coordinator Outreach  Outreach attempted x 2.  Left message on patient's voicemail with call back information and requested return call.  Plan:  Care Coordinator will do no further outreaches at this time.    Anel Hinojosa  Care Transitions Assistant  [facility]

## 2022-04-10 ENCOUNTER — HEALTH MAINTENANCE LETTER (OUTPATIENT)
Age: 57
End: 2022-04-10

## 2022-05-10 ENCOUNTER — APPOINTMENT (OUTPATIENT)
Dept: GENERAL RADIOLOGY | Facility: CLINIC | Age: 57
End: 2022-05-10
Attending: EMERGENCY MEDICINE
Payer: COMMERCIAL

## 2022-05-10 ENCOUNTER — HOSPITAL ENCOUNTER (EMERGENCY)
Facility: CLINIC | Age: 57
Discharge: HOME | End: 2022-05-10
Attending: EMERGENCY MEDICINE | Admitting: EMERGENCY MEDICINE
Payer: COMMERCIAL

## 2022-05-10 ENCOUNTER — APPOINTMENT (OUTPATIENT)
Dept: CT IMAGING | Facility: CLINIC | Age: 57
End: 2022-05-10
Attending: EMERGENCY MEDICINE
Payer: COMMERCIAL

## 2022-05-10 VITALS
TEMPERATURE: 98.1 F | SYSTOLIC BLOOD PRESSURE: 151 MMHG | RESPIRATION RATE: 18 BRPM | OXYGEN SATURATION: 97 % | DIASTOLIC BLOOD PRESSURE: 91 MMHG | HEART RATE: 88 BPM

## 2022-05-10 DIAGNOSIS — J44.1 COPD EXACERBATION (H): ICD-10-CM

## 2022-05-10 DIAGNOSIS — U07.1 INFECTION DUE TO 2019 NOVEL CORONAVIRUS: ICD-10-CM

## 2022-05-10 LAB
ANION GAP SERPL CALCULATED.3IONS-SCNC: 7 MMOL/L (ref 3–14)
BASOPHILS # BLD AUTO: 0 10E3/UL (ref 0–0.2)
BASOPHILS NFR BLD AUTO: 0 %
BUN SERPL-MCNC: 7 MG/DL (ref 7–30)
CALCIUM SERPL-MCNC: 9.2 MG/DL (ref 8.5–10.1)
CHLORIDE BLD-SCNC: 103 MMOL/L (ref 94–109)
CO2 SERPL-SCNC: 26 MMOL/L (ref 20–32)
CREAT SERPL-MCNC: 0.63 MG/DL (ref 0.52–1.04)
D DIMER PPP FEU-MCNC: 0.84 UG/ML FEU (ref 0–0.5)
EOSINOPHIL # BLD AUTO: 0.1 10E3/UL (ref 0–0.7)
EOSINOPHIL NFR BLD AUTO: 2 %
ERYTHROCYTE [DISTWIDTH] IN BLOOD BY AUTOMATED COUNT: 12.8 % (ref 10–15)
FLUAV RNA SPEC QL NAA+PROBE: NEGATIVE
FLUBV RNA RESP QL NAA+PROBE: NEGATIVE
GFR SERPL CREATININE-BSD FRML MDRD: >90 ML/MIN/1.73M2
GLUCOSE BLD-MCNC: 85 MG/DL (ref 70–99)
HCT VFR BLD AUTO: 42.3 % (ref 35–47)
HGB BLD-MCNC: 14.3 G/DL (ref 11.7–15.7)
HOLD SPECIMEN: NORMAL
IMM GRANULOCYTES # BLD: 0 10E3/UL
IMM GRANULOCYTES NFR BLD: 1 %
LYMPHOCYTES # BLD AUTO: 1.2 10E3/UL (ref 0.8–5.3)
LYMPHOCYTES NFR BLD AUTO: 24 %
MCH RBC QN AUTO: 33.6 PG (ref 26.5–33)
MCHC RBC AUTO-ENTMCNC: 33.8 G/DL (ref 31.5–36.5)
MCV RBC AUTO: 100 FL (ref 78–100)
MONOCYTES # BLD AUTO: 0.6 10E3/UL (ref 0–1.3)
MONOCYTES NFR BLD AUTO: 12 %
NEUTROPHILS # BLD AUTO: 3 10E3/UL (ref 1.6–8.3)
NEUTROPHILS NFR BLD AUTO: 61 %
NRBC # BLD AUTO: 0 10E3/UL
NRBC BLD AUTO-RTO: 0 /100
PLATELET # BLD AUTO: 163 10E3/UL (ref 150–450)
POTASSIUM BLD-SCNC: 3.8 MMOL/L (ref 3.4–5.3)
RBC # BLD AUTO: 4.25 10E6/UL (ref 3.8–5.2)
RSV RNA SPEC NAA+PROBE: NEGATIVE
SARS-COV-2 RNA RESP QL NAA+PROBE: POSITIVE
SODIUM SERPL-SCNC: 136 MMOL/L (ref 133–144)
TROPONIN I SERPL HS-MCNC: 5 NG/L
WBC # BLD AUTO: 4.9 10E3/UL (ref 4–11)

## 2022-05-10 PROCEDURE — 250N000012 HC RX MED GY IP 250 OP 636 PS 637: Performed by: EMERGENCY MEDICINE

## 2022-05-10 PROCEDURE — 250N000013 HC RX MED GY IP 250 OP 250 PS 637: Performed by: EMERGENCY MEDICINE

## 2022-05-10 PROCEDURE — 85379 FIBRIN DEGRADATION QUANT: CPT | Performed by: EMERGENCY MEDICINE

## 2022-05-10 PROCEDURE — 82310 ASSAY OF CALCIUM: CPT | Performed by: EMERGENCY MEDICINE

## 2022-05-10 PROCEDURE — C9803 HOPD COVID-19 SPEC COLLECT: HCPCS

## 2022-05-10 PROCEDURE — 85025 COMPLETE CBC W/AUTO DIFF WBC: CPT | Performed by: EMERGENCY MEDICINE

## 2022-05-10 PROCEDURE — 87637 SARSCOV2&INF A&B&RSV AMP PRB: CPT | Performed by: EMERGENCY MEDICINE

## 2022-05-10 PROCEDURE — 71275 CT ANGIOGRAPHY CHEST: CPT

## 2022-05-10 PROCEDURE — 84484 ASSAY OF TROPONIN QUANT: CPT | Performed by: EMERGENCY MEDICINE

## 2022-05-10 PROCEDURE — 80048 BASIC METABOLIC PNL TOTAL CA: CPT | Performed by: EMERGENCY MEDICINE

## 2022-05-10 PROCEDURE — 71046 X-RAY EXAM CHEST 2 VIEWS: CPT

## 2022-05-10 PROCEDURE — 250N000011 HC RX IP 250 OP 636: Performed by: EMERGENCY MEDICINE

## 2022-05-10 PROCEDURE — 99285 EMERGENCY DEPT VISIT HI MDM: CPT | Mod: 25

## 2022-05-10 PROCEDURE — 36415 COLL VENOUS BLD VENIPUNCTURE: CPT | Performed by: EMERGENCY MEDICINE

## 2022-05-10 RX ORDER — INHALER, ASSIST DEVICES
1 SPACER (EA) MISCELLANEOUS ONCE
Status: COMPLETED | OUTPATIENT
Start: 2022-05-10 | End: 2022-05-10

## 2022-05-10 RX ORDER — IOPAMIDOL 755 MG/ML
500 INJECTION, SOLUTION INTRAVASCULAR ONCE
Status: COMPLETED | OUTPATIENT
Start: 2022-05-10 | End: 2022-05-10

## 2022-05-10 RX ORDER — ALBUTEROL SULFATE 90 UG/1
6 AEROSOL, METERED RESPIRATORY (INHALATION) ONCE
Status: COMPLETED | OUTPATIENT
Start: 2022-05-10 | End: 2022-05-10

## 2022-05-10 RX ORDER — PREDNISONE 20 MG/1
TABLET ORAL
Qty: 10 TABLET | Refills: 0 | Status: ON HOLD | OUTPATIENT
Start: 2022-05-10 | End: 2022-05-17

## 2022-05-10 RX ORDER — PREDNISONE 20 MG/1
60 TABLET ORAL ONCE
Status: COMPLETED | OUTPATIENT
Start: 2022-05-10 | End: 2022-05-10

## 2022-05-10 RX ADMIN — IOPAMIDOL 104 ML: 755 INJECTION, SOLUTION INTRAVENOUS at 18:00

## 2022-05-10 RX ADMIN — PREDNISONE 60 MG: 20 TABLET ORAL at 18:55

## 2022-05-10 RX ADMIN — Medication 1 EACH: at 17:18

## 2022-05-10 RX ADMIN — ALBUTEROL SULFATE 6 PUFF: 90 AEROSOL, METERED RESPIRATORY (INHALATION) at 17:16

## 2022-05-10 ASSESSMENT — ENCOUNTER SYMPTOMS
SHORTNESS OF BREATH: 1
COUGH: 1
RHINORRHEA: 1

## 2022-05-10 NOTE — ED PROVIDER NOTES
History   Chief Complaint:  Shortness of Breath     The history is provided by the patient.      Lara Melendez is a 56 year old female with history of COPD, hypercholesteremia, hypertension, and hyperlipidemia who presents with cold symptoms that began about a week ago. She states that she has been developing a new cough, shortness of breath, congestion, rhinorrhea, and sneezing which has been persistent, but the cough and shortness of breath has worsened since Sunday. She notes that being at rest helps relieve her symptoms, but exertion will worsen them. She states she has not taken any OTC medication to help with this, as she has had experiences in the past stating that they do not work. She notes that her COPD diagnosis is new, and she quit smoking at the time of this diagnosis. She denies chest pain, leg swelling, or oral intake change.    She has received the full dose of the COVID-19 vaccine, but has not received the booster. She states that she has at-home O2 due to post-op recovery. She states she typically uses it only at night, or sometimes not at all. Ever since her symptoms began, she has been using it more frequently.    Review of Systems   Constitutional:        Oral intake change -   HENT: Positive for congestion, rhinorrhea and sneezing.    Respiratory: Positive for cough and shortness of breath.    Cardiovascular: Negative for chest pain and leg swelling.   All other systems reviewed and are negative.    Allergies:  Sulfa Drugs  Penicillins    Medications:  Albuterol  Lipitor  Synthroid  Flovent  Cozaar  Paxil  Deltasone    Past Medical History:     COPD  Hypercholesteremia  Hypertension  Hypothyroidism  Anxiety  Diverticulitis  Hyperlipidemia    Past Surgical History:    Cholecystectomy  Incision and drainage, left mandible x2  Tooth extraction     Family History:    Stomach cancer  DVT/PE  Hypertension  Hypothyroidism    Social History:  The patient presented to the ED alone.  The patient  arrived to the ED via personal vehicle.  The patient has a history of tobacco use.    Physical Exam     Patient Vitals for the past 24 hrs:   BP Temp Pulse Resp SpO2   05/10/22 1845 -- -- -- -- 97 %   05/10/22 1830 (!) 151/91 -- 88 -- 100 %   05/10/22 1645 (!) 145/95 -- 93 -- 98 %   05/10/22 1640 -- -- -- -- 99 %   05/10/22 1319 -- 98.1  F (36.7  C) 115 18 96 %     Physical Exam  General: Alert, no acute distress; well appearing; speaking in full sentences  HEENT:  Moist mucous membranes.   Conjunctiva normal.  CV:  RRR, no m/r/g, skin warm and well perfused  Pulm:  Faint inspiratory/expriatory wheezing to right posterior lower lung field and left anterior lung field.  No acute distress, breathing comfortably  GI:  Soft, nontender, nondistended.  No rebound or guarding.     MSK:  Moving all extremities.  No focal areas of edema, erythema, or tenderness  Skin:  WWP, no rashes, no lower extremity edema, skin color normal, no diaphoresis  Psych:  Well-appearing, normal affect, regular speech    Emergency Department Course   ECG  ECG obtained at 1443, ECG read at 1627  ECG results from 05/10/22   EKG 12-lead, tracing only     Value    Systolic Blood Pressure     Diastolic Blood Pressure     Ventricular Rate 99    Atrial Rate 99    NV Interval 134    QRS Duration 72        QTc 441    P Axis 53    R AXIS 69    T Axis 31    Interpretation ECG      Sinus rhythm with sinus arrhythmia  Normal ECG  When compared with ECG of 21-FEB-2022 14:08,  No significant change was found       Imaging:  CT Chest Pulmonary Embolism w Contrast   Final Result   IMPRESSION:   1.  No pulmonary embolus, aortic dissection, or aneurysm.   2.  Emphysema.   3.  Calcified granulomata consistent with prior granulomatous disease/infection.         XR Chest 2 Views   Final Result   IMPRESSION: PA and lateral views of the chest were obtained.   Cardiomediastinal silhouette is within normal limits. Left apical   patchy nodular pulmonary opacities,  likely corresponds to the small   nodular calcification seen on 2/21/2022 chest CT. Mild bibasilar   pulmonary opacities, likely atelectasis. No significant pleural   effusion or pneumothorax.      TENZIN SHEPHERD MD            SYSTEM ID:  [id number]        Report per radiology    Laboratory:  Labs Ordered and Resulted from Time of ED Arrival to Time of ED Departure   INFLUENZA A/B & SARS-COV2 PCR MULTIPLEX - Abnormal       Result Value    Influenza A PCR Negative      Influenza B PCR Negative      RSV PCR Negative      SARS CoV2 PCR Positive (*)    CBC WITH PLATELETS AND DIFFERENTIAL - Abnormal    WBC Count 4.9      RBC Count 4.25      Hemoglobin 14.3      Hematocrit 42.3            MCH 33.6 (*)     MCHC 33.8      RDW 12.8      Platelet Count 163      % Neutrophils 61      % Lymphocytes 24      % Monocytes 12      % Eosinophils 2      % Basophils 0      % Immature Granulocytes 1      NRBCs per 100 WBC 0      Absolute Neutrophils 3.0      Absolute Lymphocytes 1.2      Absolute Monocytes 0.6      Absolute Eosinophils 0.1      Absolute Basophils 0.0      Absolute Immature Granulocytes 0.0      Absolute NRBCs 0.0     D DIMER QUANTITATIVE - Abnormal    D-Dimer Quantitative 0.84 (*)    BASIC METABOLIC PANEL - Normal    Sodium 136      Potassium 3.8      Chloride 103      Carbon Dioxide (CO2) 26      Anion Gap 7      Urea Nitrogen 7      Creatinine 0.63      Calcium 9.2      Glucose 85      GFR Estimate >90     TROPONIN I - Normal    Troponin I High Sensitivity 5        Emergency Department Course:    Reviewed:  I reviewed nursing notes, vitals, past medical history and Care Everywhere    Assessments:  1833 I obtained history and examined the patient as noted above.     Interventions:  1716 Albuterol 6 puffs  1718 Aerochamber with mouthpiece 1 each  1800  ml IV   Iopamidol 104 ml IV    Disposition:  The patient was discharged to home.     Impression & Plan     Medical Decision Making:  Lara amin a  56 year old female with history of COPD who presents to the ER for evaluation of cough and shortness of breath since Saturday.  Cold symptoms of rhinorrhea, congestion and sneezing started 7 days prior.  She is afebrile and vitally stable and noted to be on 2 L O2.  She does not appear to be in any respiratory distress although has faint bilateral wheezing.  Broad differential was considered including COPD exacerbation, CHF, pneumonia, COVID-19 amongst other things.  COVID-19 PCR is positive.  EKG shows no acute ischemic changes and troponin is normal.  Patient denies any chest pain.  She does not look fluid overloaded.  The rest of her lab studies are largely unremarkable.  CT PE study obtained given elevated dimer thankfully is negative for pulmonary embolism or other acute intrathoracic pathology.  Noted granuloma discussed with the patient and will need follow-up CT in 1 year which she knows about from prior CT.  She was able to ambulate in the ER off of oxygen without hypoxia or worsening shortness of breath.  Overall suspect mild COPD exacerbation likely triggered from COVID-19.  She is vaccinated x2.  I do not feel that she requires hospitalization.  Unfortunately, she is outside of the window for COVID-19 oral antivirals.  We will have her start prednisone burst.  Recommend albuterol and neb as directed.  Follow-up with PCP next week.  Reasons to return to the ER were discussed.  Patient is comfortable with this plan.  All questions answered prior to discharge.      Diagnosis:    ICD-10-CM    1. Infection due to 2019 novel coronavirus  U07.1    2. COPD exacerbation (H)  J44.1        Discharge Medications:  Discharge Medication List as of 5/10/2022  6:53 PM      START taking these medications    Details   !! predniSONE (DELTASONE) 20 MG tablet Take two tablets (= 40mg) each day for 5 (five) days, Disp-10 tablet, R-0, E-Prescribe       !! - Potential duplicate medications found. Please discuss with provider.           Scribe Disclosure:  I, Fredy Watson, am serving as a scribe at 4:05 PM on 5/10/2022 to document services personally performed by Galo Evans MD based on my observations and the provider's statements to me.          Galo Evans MD  05/10/22 1918

## 2022-05-10 NOTE — ED TRIAGE NOTES
Patient presents to the ED reporting worsening shortness of breath. History of COPD. States began on Sunday and has been progressively worsening. Arrives wearing 2L O2 from home. States usually wears just at night, but has been wearing today.

## 2022-05-11 ENCOUNTER — PATIENT OUTREACH (OUTPATIENT)
Dept: CARE COORDINATION | Facility: CLINIC | Age: 57
End: 2022-05-11
Payer: COMMERCIAL

## 2022-05-11 DIAGNOSIS — Z71.89 OTHER SPECIFIED COUNSELING: ICD-10-CM

## 2022-05-11 LAB
ATRIAL RATE - MUSE: 99 BPM
DIASTOLIC BLOOD PRESSURE - MUSE: NORMAL MMHG
INTERPRETATION ECG - MUSE: NORMAL
P AXIS - MUSE: 53 DEGREES
PR INTERVAL - MUSE: 134 MS
QRS DURATION - MUSE: 72 MS
QT - MUSE: 344 MS
QTC - MUSE: 441 MS
R AXIS - MUSE: 69 DEGREES
SYSTOLIC BLOOD PRESSURE - MUSE: NORMAL MMHG
T AXIS - MUSE: 31 DEGREES
VENTRICULAR RATE- MUSE: 99 BPM

## 2022-05-11 NOTE — PROGRESS NOTES
Clinic Care Coordination Contact  [facility]/Voicemail     Clinical Data: Care Coordinator Outreach - TCM      Outreach attempted x 1.  Left message on patient's voicemail, providing [facility]'s 24/7 scheduling and nurse triage phone number [phone] ([phone]) for questions/concerns.      Plan:  Care Coordinator will try to reach patient again in 1-2 business days.       Marce Manzanares, EDINSON  [facility], [facility]

## 2022-05-12 NOTE — PROGRESS NOTES
Clinic Care Coordination Contact  [facility]/Voicemail     Clinical Data: Care Coordinator Outreach - TCM      Outreach attempted x 2.  Left message on patient's voicemail, providing [facility]'s 24/7 scheduling and nurse triage phone number [phone] ([phone]) for questions/concerns.      Plan:  Care Coordinator will do no further outreaches at this time.     Marce Manzanares, RN  [facility], [facility]

## 2022-05-14 ENCOUNTER — HOSPITAL ENCOUNTER (INPATIENT)
Facility: CLINIC | Age: 57
LOS: 3 days | Discharge: HOME | End: 2022-05-17
Attending: EMERGENCY MEDICINE | Admitting: INTERNAL MEDICINE
Payer: COMMERCIAL

## 2022-05-14 ENCOUNTER — APPOINTMENT (OUTPATIENT)
Dept: GENERAL RADIOLOGY | Facility: CLINIC | Age: 57
End: 2022-05-14
Attending: EMERGENCY MEDICINE
Payer: COMMERCIAL

## 2022-05-14 DIAGNOSIS — J44.9 CHRONIC OBSTRUCTIVE PULMONARY DISEASE, UNSPECIFIED COPD TYPE (H): ICD-10-CM

## 2022-05-14 DIAGNOSIS — J44.1 COPD WITH ACUTE EXACERBATION (H): ICD-10-CM

## 2022-05-14 DIAGNOSIS — U07.1 INFECTION DUE TO 2019 NOVEL CORONAVIRUS: ICD-10-CM

## 2022-05-14 DIAGNOSIS — J96.01 ACUTE RESPIRATORY FAILURE WITH HYPOXIA (H): Primary | ICD-10-CM

## 2022-05-14 DIAGNOSIS — J96.02 ACUTE RESPIRATORY FAILURE WITH HYPERCAPNIA (H): ICD-10-CM

## 2022-05-14 DIAGNOSIS — J44.1 COPD EXACERBATION (H): ICD-10-CM

## 2022-05-14 LAB
ALBUMIN SERPL-MCNC: 3.5 G/DL (ref 3.4–5)
ALBUMIN SERPL-MCNC: 3.5 G/DL (ref 3.4–5)
ALP SERPL-CCNC: 102 U/L (ref 40–150)
ALP SERPL-CCNC: 104 U/L (ref 40–150)
ALT SERPL W P-5'-P-CCNC: 51 U/L (ref 0–50)
ALT SERPL W P-5'-P-CCNC: 53 U/L (ref 0–50)
ANION GAP SERPL CALCULATED.3IONS-SCNC: 5 MMOL/L (ref 3–14)
ANION GAP SERPL CALCULATED.3IONS-SCNC: 5 MMOL/L (ref 3–14)
ANION GAP SERPL CALCULATED.3IONS-SCNC: 7 MMOL/L (ref 3–14)
AST SERPL W P-5'-P-CCNC: 49 U/L (ref 0–45)
AST SERPL W P-5'-P-CCNC: 51 U/L (ref 0–45)
BASE EXCESS BLDV CALC-SCNC: 0.2 MMOL/L (ref -7.7–1.9)
BASE EXCESS BLDV CALC-SCNC: 0.4 MMOL/L (ref -7.7–1.9)
BASOPHILS # BLD AUTO: 0 10E3/UL (ref 0–0.2)
BASOPHILS # BLD AUTO: 0 10E3/UL (ref 0–0.2)
BASOPHILS NFR BLD AUTO: 0 %
BASOPHILS NFR BLD AUTO: 0 %
BILIRUB SERPL-MCNC: 0.5 MG/DL (ref 0.2–1.3)
BILIRUB SERPL-MCNC: 0.5 MG/DL (ref 0.2–1.3)
BUN SERPL-MCNC: 11 MG/DL (ref 7–30)
CALCIUM SERPL-MCNC: 8.6 MG/DL (ref 8.5–10.1)
CALCIUM SERPL-MCNC: 8.6 MG/DL (ref 8.5–10.1)
CALCIUM SERPL-MCNC: 8.7 MG/DL (ref 8.5–10.1)
CHLORIDE BLD-SCNC: 106 MMOL/L (ref 94–109)
CHLORIDE BLD-SCNC: 107 MMOL/L (ref 94–109)
CHLORIDE BLD-SCNC: 107 MMOL/L (ref 94–109)
CO2 SERPL-SCNC: 25 MMOL/L (ref 20–32)
CO2 SERPL-SCNC: 27 MMOL/L (ref 20–32)
CO2 SERPL-SCNC: 27 MMOL/L (ref 20–32)
CREAT SERPL-MCNC: 0.59 MG/DL (ref 0.52–1.04)
CREAT SERPL-MCNC: 0.59 MG/DL (ref 0.52–1.04)
CREAT SERPL-MCNC: 0.64 MG/DL (ref 0.52–1.04)
CREAT SERPL-MCNC: 0.65 MG/DL (ref 0.52–1.04)
CRP SERPL-MCNC: 5.1 MG/L (ref 0–8)
D DIMER PPP FEU-MCNC: 0.99 UG/ML FEU (ref 0–0.5)
D DIMER PPP FEU-MCNC: 1.47 UG/ML FEU (ref 0–0.5)
EOSINOPHIL # BLD AUTO: 0 10E3/UL (ref 0–0.7)
EOSINOPHIL # BLD AUTO: 0.1 10E3/UL (ref 0–0.7)
EOSINOPHIL NFR BLD AUTO: 0 %
EOSINOPHIL NFR BLD AUTO: 1 %
ERYTHROCYTE [DISTWIDTH] IN BLOOD BY AUTOMATED COUNT: 13 % (ref 10–15)
ERYTHROCYTE [DISTWIDTH] IN BLOOD BY AUTOMATED COUNT: 13.8 % (ref 10–15)
FLUAV RNA SPEC QL NAA+PROBE: NEGATIVE
FLUBV RNA RESP QL NAA+PROBE: NEGATIVE
GFR SERPL CREATININE-BSD FRML MDRD: >90 ML/MIN/1.73M2
GLUCOSE BLD-MCNC: 118 MG/DL (ref 70–99)
GLUCOSE BLD-MCNC: 120 MG/DL (ref 70–99)
GLUCOSE BLD-MCNC: 120 MG/DL (ref 70–99)
GLUCOSE BLDC GLUCOMTR-MCNC: 146 MG/DL (ref 70–99)
HCO3 BLDV-SCNC: 25 MMOL/L (ref 21–28)
HCO3 BLDV-SCNC: 28 MMOL/L (ref 21–28)
HCT VFR BLD AUTO: 38.8 % (ref 35–47)
HCT VFR BLD AUTO: 39.6 % (ref 35–47)
HGB BLD-MCNC: 13.5 G/DL (ref 11.7–15.7)
HGB BLD-MCNC: 14.5 G/DL (ref 11.7–15.7)
HOLD SPECIMEN: NORMAL
IMM GRANULOCYTES # BLD: 0.1 10E3/UL
IMM GRANULOCYTES # BLD: 0.2 10E3/UL
IMM GRANULOCYTES NFR BLD: 1 %
IMM GRANULOCYTES NFR BLD: 2 %
INR PPP: 1.03 (ref 0.85–1.15)
LACTATE SERPL-SCNC: 0.8 MMOL/L (ref 0.7–2)
LYMPHOCYTES # BLD AUTO: 0.4 10E3/UL (ref 0.8–5.3)
LYMPHOCYTES # BLD AUTO: 2 10E3/UL (ref 0.8–5.3)
LYMPHOCYTES NFR BLD AUTO: 23 %
LYMPHOCYTES NFR BLD AUTO: 4 %
MAGNESIUM SERPL-MCNC: 2.2 MG/DL (ref 1.6–2.3)
MCH RBC QN AUTO: 35.6 PG (ref 26.5–33)
MCH RBC QN AUTO: 38.1 PG (ref 26.5–33)
MCHC RBC AUTO-ENTMCNC: 34.8 G/DL (ref 31.5–36.5)
MCHC RBC AUTO-ENTMCNC: 36.6 G/DL (ref 31.5–36.5)
MCV RBC AUTO: 102 FL (ref 78–100)
MCV RBC AUTO: 104 FL (ref 78–100)
MONOCYTES # BLD AUTO: 0.1 10E3/UL (ref 0–1.3)
MONOCYTES # BLD AUTO: 0.7 10E3/UL (ref 0–1.3)
MONOCYTES NFR BLD AUTO: 2 %
MONOCYTES NFR BLD AUTO: 9 %
NEUTROPHILS # BLD AUTO: 5.4 10E3/UL (ref 1.6–8.3)
NEUTROPHILS # BLD AUTO: 8 10E3/UL (ref 1.6–8.3)
NEUTROPHILS NFR BLD AUTO: 65 %
NEUTROPHILS NFR BLD AUTO: 93 %
NRBC # BLD AUTO: 0 10E3/UL
NRBC # BLD AUTO: 0 10E3/UL
NRBC BLD AUTO-RTO: 0 /100
NRBC BLD AUTO-RTO: 0 /100
NT-PROBNP SERPL-MCNC: 178 PG/ML (ref 0–900)
O2/TOTAL GAS SETTING VFR VENT: 30 %
O2/TOTAL GAS SETTING VFR VENT: 30 %
OXYHGB MFR BLDV: 78 % (ref 70–75)
OXYHGB MFR BLDV: 93 % (ref 70–75)
PCO2 BLDV: 41 MM HG (ref 40–50)
PCO2 BLDV: 56 MM HG (ref 40–50)
PH BLDV: 7.31 [PH] (ref 7.32–7.43)
PH BLDV: 7.4 [PH] (ref 7.32–7.43)
PLAT MORPH BLD: NORMAL
PLATELET # BLD AUTO: 194 10E3/UL (ref 150–450)
PLATELET # BLD AUTO: 220 10E3/UL (ref 150–450)
PO2 BLDV: 42 MM HG (ref 25–47)
PO2 BLDV: 70 MM HG (ref 25–47)
POTASSIUM BLD-SCNC: 3.9 MMOL/L (ref 3.4–5.3)
POTASSIUM BLD-SCNC: 4.5 MMOL/L (ref 3.4–5.3)
PROT SERPL-MCNC: 7.8 G/DL (ref 6.8–8.8)
PROT SERPL-MCNC: 7.9 G/DL (ref 6.8–8.8)
RBC # BLD AUTO: 3.79 10E6/UL (ref 3.8–5.2)
RBC # BLD AUTO: 3.81 10E6/UL (ref 3.8–5.2)
RBC MORPH BLD: NORMAL
RSV RNA SPEC NAA+PROBE: NEGATIVE
SARS-COV-2 RNA RESP QL NAA+PROBE: POSITIVE
SODIUM SERPL-SCNC: 138 MMOL/L (ref 133–144)
SODIUM SERPL-SCNC: 139 MMOL/L (ref 133–144)
SODIUM SERPL-SCNC: 139 MMOL/L (ref 133–144)
TROPONIN I SERPL HS-MCNC: 35 NG/L
TROPONIN I SERPL HS-MCNC: 8 NG/L
WBC # BLD AUTO: 8.5 10E3/UL (ref 4–11)
WBC # BLD AUTO: 8.6 10E3/UL (ref 4–11)

## 2022-05-14 PROCEDURE — 71045 X-RAY EXAM CHEST 1 VIEW: CPT

## 2022-05-14 PROCEDURE — 250N000013 HC RX MED GY IP 250 OP 250 PS 637: Performed by: INTERNAL MEDICINE

## 2022-05-14 PROCEDURE — 258N000003 HC RX IP 258 OP 636: Performed by: EMERGENCY MEDICINE

## 2022-05-14 PROCEDURE — 80053 COMPREHEN METABOLIC PANEL: CPT | Performed by: EMERGENCY MEDICINE

## 2022-05-14 PROCEDURE — 96360 HYDRATION IV INFUSION INIT: CPT

## 2022-05-14 PROCEDURE — 84155 ASSAY OF PROTEIN SERUM: CPT | Performed by: INTERNAL MEDICINE

## 2022-05-14 PROCEDURE — 99285 EMERGENCY DEPT VISIT HI MDM: CPT | Mod: 25

## 2022-05-14 PROCEDURE — 85025 COMPLETE CBC W/AUTO DIFF WBC: CPT | Performed by: EMERGENCY MEDICINE

## 2022-05-14 PROCEDURE — 36415 COLL VENOUS BLD VENIPUNCTURE: CPT | Performed by: EMERGENCY MEDICINE

## 2022-05-14 PROCEDURE — 250N000009 HC RX 250: Performed by: EMERGENCY MEDICINE

## 2022-05-14 PROCEDURE — 86140 C-REACTIVE PROTEIN: CPT | Performed by: INTERNAL MEDICINE

## 2022-05-14 PROCEDURE — 5A09357 ASSISTANCE WITH RESPIRATORY VENTILATION, LESS THAN 24 CONSECUTIVE HOURS, CONTINUOUS POSITIVE AIRWAY PRESSURE: ICD-10-PCS | Performed by: INTERNAL MEDICINE

## 2022-05-14 PROCEDURE — 83880 ASSAY OF NATRIURETIC PEPTIDE: CPT | Performed by: EMERGENCY MEDICINE

## 2022-05-14 PROCEDURE — 84484 ASSAY OF TROPONIN QUANT: CPT | Performed by: INTERNAL MEDICINE

## 2022-05-14 PROCEDURE — 85025 COMPLETE CBC W/AUTO DIFF WBC: CPT | Performed by: INTERNAL MEDICINE

## 2022-05-14 PROCEDURE — 87637 SARSCOV2&INF A&B&RSV AMP PRB: CPT | Performed by: EMERGENCY MEDICINE

## 2022-05-14 PROCEDURE — 99223 1ST HOSP IP/OBS HIGH 75: CPT | Mod: AI | Performed by: INTERNAL MEDICINE

## 2022-05-14 PROCEDURE — 83735 ASSAY OF MAGNESIUM: CPT | Performed by: INTERNAL MEDICINE

## 2022-05-14 PROCEDURE — 96361 HYDRATE IV INFUSION ADD-ON: CPT

## 2022-05-14 PROCEDURE — 83605 ASSAY OF LACTIC ACID: CPT | Performed by: EMERGENCY MEDICINE

## 2022-05-14 PROCEDURE — 93005 ELECTROCARDIOGRAM TRACING: CPT

## 2022-05-14 PROCEDURE — 84484 ASSAY OF TROPONIN QUANT: CPT | Performed by: EMERGENCY MEDICINE

## 2022-05-14 PROCEDURE — 999N000157 HC STATISTIC RCP TIME EA 10 MIN

## 2022-05-14 PROCEDURE — 94660 CPAP INITIATION&MGMT: CPT

## 2022-05-14 PROCEDURE — 250N000011 HC RX IP 250 OP 636: Performed by: INTERNAL MEDICINE

## 2022-05-14 PROCEDURE — 84132 ASSAY OF SERUM POTASSIUM: CPT | Performed by: INTERNAL MEDICINE

## 2022-05-14 PROCEDURE — 82565 ASSAY OF CREATININE: CPT | Performed by: INTERNAL MEDICINE

## 2022-05-14 PROCEDURE — 85379 FIBRIN DEGRADATION QUANT: CPT | Performed by: EMERGENCY MEDICINE

## 2022-05-14 PROCEDURE — 120N000001 HC R&B MED SURG/OB

## 2022-05-14 PROCEDURE — 94640 AIRWAY INHALATION TREATMENT: CPT

## 2022-05-14 PROCEDURE — C9803 HOPD COVID-19 SPEC COLLECT: HCPCS

## 2022-05-14 PROCEDURE — 82805 BLOOD GASES W/O2 SATURATION: CPT | Performed by: EMERGENCY MEDICINE

## 2022-05-14 PROCEDURE — 36415 COLL VENOUS BLD VENIPUNCTURE: CPT | Performed by: INTERNAL MEDICINE

## 2022-05-14 PROCEDURE — 85379 FIBRIN DEGRADATION QUANT: CPT | Performed by: INTERNAL MEDICINE

## 2022-05-14 PROCEDURE — 84450 TRANSFERASE (AST) (SGOT): CPT | Performed by: INTERNAL MEDICINE

## 2022-05-14 PROCEDURE — 85610 PROTHROMBIN TIME: CPT | Performed by: INTERNAL MEDICINE

## 2022-05-14 RX ORDER — PROCHLORPERAZINE MALEATE 10 MG
10 TABLET ORAL EVERY 6 HOURS PRN
Status: DISCONTINUED | OUTPATIENT
Start: 2022-05-14 | End: 2022-05-17 | Stop reason: HOSPADM

## 2022-05-14 RX ORDER — ATORVASTATIN CALCIUM 20 MG/1
20 TABLET, FILM COATED ORAL DAILY
Status: DISCONTINUED | OUTPATIENT
Start: 2022-05-14 | End: 2022-05-17 | Stop reason: HOSPADM

## 2022-05-14 RX ORDER — LIDOCAINE 40 MG/G
CREAM TOPICAL
Status: DISCONTINUED | OUTPATIENT
Start: 2022-05-14 | End: 2022-05-16

## 2022-05-14 RX ORDER — ACETAMINOPHEN 650 MG/1
650 SUPPOSITORY RECTAL EVERY 6 HOURS PRN
Status: DISCONTINUED | OUTPATIENT
Start: 2022-05-14 | End: 2022-05-17 | Stop reason: HOSPADM

## 2022-05-14 RX ORDER — LIDOCAINE 40 MG/G
CREAM TOPICAL
Status: DISCONTINUED | OUTPATIENT
Start: 2022-05-14 | End: 2022-05-17 | Stop reason: HOSPADM

## 2022-05-14 RX ORDER — LEVOTHYROXINE SODIUM 112 UG/1
112 TABLET ORAL DAILY
Status: DISCONTINUED | OUTPATIENT
Start: 2022-05-14 | End: 2022-05-17 | Stop reason: HOSPADM

## 2022-05-14 RX ORDER — ONDANSETRON 4 MG/1
4 TABLET, ORALLY DISINTEGRATING ORAL EVERY 6 HOURS PRN
Status: DISCONTINUED | OUTPATIENT
Start: 2022-05-14 | End: 2022-05-17 | Stop reason: HOSPADM

## 2022-05-14 RX ORDER — ONDANSETRON 2 MG/ML
4 INJECTION INTRAMUSCULAR; INTRAVENOUS EVERY 6 HOURS PRN
Status: DISCONTINUED | OUTPATIENT
Start: 2022-05-14 | End: 2022-05-17 | Stop reason: HOSPADM

## 2022-05-14 RX ORDER — ENOXAPARIN SODIUM 100 MG/ML
40 INJECTION SUBCUTANEOUS EVERY 24 HOURS
Status: DISCONTINUED | OUTPATIENT
Start: 2022-05-14 | End: 2022-05-17 | Stop reason: HOSPADM

## 2022-05-14 RX ORDER — LEVALBUTEROL TARTRATE 45 UG/1
2 AEROSOL, METERED ORAL
Status: DISCONTINUED | OUTPATIENT
Start: 2022-05-14 | End: 2022-05-17 | Stop reason: HOSPADM

## 2022-05-14 RX ORDER — ACETAMINOPHEN 325 MG/1
650 TABLET ORAL EVERY 6 HOURS PRN
Status: DISCONTINUED | OUTPATIENT
Start: 2022-05-14 | End: 2022-05-17 | Stop reason: HOSPADM

## 2022-05-14 RX ORDER — METHYLPREDNISOLONE SODIUM SUCCINATE 40 MG/ML
40 INJECTION, POWDER, LYOPHILIZED, FOR SOLUTION INTRAMUSCULAR; INTRAVENOUS EVERY 8 HOURS
Status: DISCONTINUED | OUTPATIENT
Start: 2022-05-14 | End: 2022-05-17 | Stop reason: HOSPADM

## 2022-05-14 RX ORDER — LOSARTAN POTASSIUM 25 MG/1
25 TABLET ORAL DAILY
Status: DISCONTINUED | OUTPATIENT
Start: 2022-05-14 | End: 2022-05-17 | Stop reason: HOSPADM

## 2022-05-14 RX ORDER — CODEINE PHOSPHATE AND GUAIFENESIN 10; 100 MG/5ML; MG/5ML
5 SOLUTION ORAL EVERY 4 HOURS PRN
Status: DISCONTINUED | OUTPATIENT
Start: 2022-05-14 | End: 2022-05-17 | Stop reason: HOSPADM

## 2022-05-14 RX ORDER — LIDOCAINE 40 MG/G
CREAM TOPICAL
Status: DISCONTINUED | OUTPATIENT
Start: 2022-05-14 | End: 2022-05-14

## 2022-05-14 RX ORDER — PROCHLORPERAZINE 25 MG
25 SUPPOSITORY, RECTAL RECTAL EVERY 12 HOURS PRN
Status: DISCONTINUED | OUTPATIENT
Start: 2022-05-14 | End: 2022-05-17 | Stop reason: HOSPADM

## 2022-05-14 RX ORDER — GUAIFENESIN 600 MG/1
600 TABLET, EXTENDED RELEASE ORAL 2 TIMES DAILY
Status: DISCONTINUED | OUTPATIENT
Start: 2022-05-14 | End: 2022-05-17 | Stop reason: HOSPADM

## 2022-05-14 RX ORDER — IPRATROPIUM BROMIDE AND ALBUTEROL SULFATE 2.5; .5 MG/3ML; MG/3ML
3 SOLUTION RESPIRATORY (INHALATION) ONCE
Status: COMPLETED | OUTPATIENT
Start: 2022-05-14 | End: 2022-05-14

## 2022-05-14 RX ORDER — LEVALBUTEROL INHALATION SOLUTION 1.25 MG/3ML
1.25 SOLUTION RESPIRATORY (INHALATION) EVERY 4 HOURS PRN
Status: DISCONTINUED | OUTPATIENT
Start: 2022-05-14 | End: 2022-05-14

## 2022-05-14 RX ORDER — NITROGLYCERIN 0.4 MG/1
0.4 TABLET SUBLINGUAL EVERY 5 MIN PRN
Status: DISCONTINUED | OUTPATIENT
Start: 2022-05-14 | End: 2022-05-17 | Stop reason: HOSPADM

## 2022-05-14 RX ORDER — LEVALBUTEROL INHALATION SOLUTION 1.25 MG/3ML
1.25 SOLUTION RESPIRATORY (INHALATION) EVERY 6 HOURS
Status: DISCONTINUED | OUTPATIENT
Start: 2022-05-14 | End: 2022-05-14

## 2022-05-14 RX ORDER — PAROXETINE 10 MG/1
10 TABLET, FILM COATED ORAL DAILY
Status: DISCONTINUED | OUTPATIENT
Start: 2022-05-14 | End: 2022-05-17 | Stop reason: HOSPADM

## 2022-05-14 RX ORDER — BENZONATATE 100 MG/1
100 CAPSULE ORAL 3 TIMES DAILY PRN
Status: DISCONTINUED | OUTPATIENT
Start: 2022-05-14 | End: 2022-05-17 | Stop reason: HOSPADM

## 2022-05-14 RX ORDER — FLUTICASONE PROPIONATE AND SALMETEROL XINAFOATE 230; 21 UG/1; UG/1
2 AEROSOL, METERED RESPIRATORY (INHALATION) 2 TIMES DAILY
Status: ON HOLD | COMMUNITY
End: 2022-05-17

## 2022-05-14 RX ADMIN — LEVALBUTEROL TARTRATE 2 PUFF: 45 AEROSOL, METERED ORAL at 19:58

## 2022-05-14 RX ADMIN — GUAIFENESIN 600 MG: 600 TABLET, EXTENDED RELEASE ORAL at 18:49

## 2022-05-14 RX ADMIN — BENZONATATE 100 MG: 100 CAPSULE ORAL at 18:49

## 2022-05-14 RX ADMIN — IPRATROPIUM BROMIDE AND ALBUTEROL SULFATE 3 ML: 2.5; .5 SOLUTION RESPIRATORY (INHALATION) at 12:31

## 2022-05-14 RX ADMIN — LOSARTAN POTASSIUM 25 MG: 25 TABLET, FILM COATED ORAL at 18:00

## 2022-05-14 RX ADMIN — METHYLPREDNISOLONE SODIUM SUCCINATE 40 MG: 40 INJECTION, POWDER, FOR SOLUTION INTRAMUSCULAR; INTRAVENOUS at 17:52

## 2022-05-14 RX ADMIN — LEVOTHYROXINE SODIUM 112 MCG: 0.11 TABLET ORAL at 17:53

## 2022-05-14 RX ADMIN — ATORVASTATIN CALCIUM 20 MG: 20 TABLET, FILM COATED ORAL at 17:52

## 2022-05-14 RX ADMIN — SODIUM CHLORIDE 1000 ML: 9 INJECTION, SOLUTION INTRAVENOUS at 12:34

## 2022-05-14 RX ADMIN — ENOXAPARIN SODIUM 40 MG: 40 INJECTION SUBCUTANEOUS at 17:52

## 2022-05-14 RX ADMIN — PAROXETINE HYDROCHLORIDE 10 MG: 10 TABLET, FILM COATED ORAL at 17:53

## 2022-05-14 ASSESSMENT — ACTIVITIES OF DAILY LIVING (ADL)
ADLS_ACUITY_SCORE: 35
ADLS_ACUITY_SCORE: 22
WEAR_GLASSES_OR_BLIND: YES
ADLS_ACUITY_SCORE: 22
DRESSING/BATHING_DIFFICULTY: NO
DOING_ERRANDS_INDEPENDENTLY_DIFFICULTY: NO
TOILETING_ISSUES: NO
CHANGE_IN_FUNCTIONAL_STATUS_SINCE_ONSET_OF_CURRENT_ILLNESS/INJURY: NO
VISION_MANAGEMENT: GLASSES
FALL_HISTORY_WITHIN_LAST_SIX_MONTHS: NO
ADLS_ACUITY_SCORE: 35
DIFFICULTY_EATING/SWALLOWING: NO
CONCENTRATING,_REMEMBERING_OR_MAKING_DECISIONS_DIFFICULTY: NO
WALKING_OR_CLIMBING_STAIRS_DIFFICULTY: NO
ADLS_ACUITY_SCORE: 22

## 2022-05-14 ASSESSMENT — ENCOUNTER SYMPTOMS
SHORTNESS OF BREATH: 1
COUGH: 1
WHEEZING: 1
ABDOMINAL PAIN: 0
CHEST TIGHTNESS: 1

## 2022-05-14 NOTE — ED TRIAGE NOTES
Came in via EMS. Was in ER on Tuesday and tested positive for COVID and was discharged with inhalors. Comes back with worsening SOB. New hx of COPD. EMS found patient in respiratory distress and wheezing. RR was 30s-50s. Came in on oxymask at 12LO2. HTN and BG was 112. AxOx4.

## 2022-05-14 NOTE — H&P
[facility]    Hospitalist Admission Note    Name: Lara Melendez    MRN: [id number]  YOB: 1965    Age: 56 year old  Date of admission: 5/14/2022  Primary care provider: Chyna Rust  Admitting physician : Galdino Devine M.D. ,M.B.A.       Brief summary of admission assessment/MDM:    Lara Melendez is a 56 year old  female with a significant past medical history of COPD on as needed oxygen at home, hypertension, hyperlipidemia who presents with worsening shortness of breath.  Patient was seen on May 10 for shortness of breath and was diagnosed with COVID-19 infection and COPD exacerbation.  Patient's symptoms progressively got worse and she came to the emergency department for further evaluation.    On presentation to the emergency department, blood pressure 175/112, heart rate was 148, temp was 98.2, oxygen saturation 97%.  EKG showed sinus tachycardia with premature atrial complexes.  Chest x-ray was clear.  Initial VBG showed pH of 7.31, PCO2 56, PO2 70.  Normal bicarb    Patient was started on BiPAP therapy due to respiratory distress and hospitalist service was consulted for admission and further care.      Assessment and Plan       #1.  Acute respiratory failure-hypoxic and hypercapnic likely secondary to acute COPD exacerbation in the setting of recent COVID-19 infection.  --Continue BiPAP therapy, transition to nasal supplemental oxygen soon.  -- Treated with nebs, IV steroid and close monitoring.    #2.  Acute COPD exacerbation  -- Continue BiPAP as needed, supplemental oxygen, IV methylprednisolone, scheduled DuoNeb and as needed bronchodilator therapy.    #3.  COVID-19 infection  -- Patient has positive COVID test from May 10.  Patient does not appear to have pulmonary infiltrates.  She was vaccinated with Moderna but was not boosted  -- I doubt this is the primary reason for respiratory failure but definitely contributes to COPD  exacerbation  -- Continue supplemental oxygen, continue steroid with methylprednisolone, I would not do remdesivir or other COVID-specific medications.  -- D-dimer is elevated but I doubt she has venous thromboembolism.  -- Continue to monitor COVID inflammatory markers such as CRP, D-dimer    #4.  Mild LFT mildly, monitor for now    #5.  Hypertension on atenolol  -- Currently uncontrolled due to acute illness.  I will make as needed hydralazine available for hypertensive urgency    #6.  Hyperlipidemia on statin    #7.  Hypothyroidism on levothyroxine    #8.  Anxiety on Paxil    #9.  Sinus tachycardia: Likely driven by respiratory failure.  Monitor on telemetry        # Admission Status: Will admit patient to hospitalist service as inpatient as patient likely need over two mid night stay  in the hospital.    # Diet:Diet advanced    # Activity:Advance activity as tolerated    # Education/Counseling :Discussed treatment plan with the patient    # Consults:none     # VTE prophylactic measures:prophylaxis against venous thromboembolism with Lovenox       # Code Status:Full Code    # Disposition:anticipate discharge to home and Anticipate discharge in 3 days        Disclaimer: This note consists of symbols derived from keyboarding, dictation and/or voice recognition software. As a result, there may be errors in the script that have gone undetected. Please consider this when interpreting information found in this chart.             Chief Complaint:     Shortness of breath  History is obtained from the patient          History of Present Illness:      This patient is a 56 year old  female with a significant past medical history of recent visit to emergency department for increased shortness of breath when she was diagnosed with COPD and COVID-19 infection who presents with the following condition requiring a hospital admission:    Acute respiratory failure secondary to COPD exacerbation as well as COVID-19  infection.    Patient is 56-year-old female with history of COPD, hypertension, hyperlipidemia, anxiety, hypothyroidism who presents with increased shortness of breath.  She was recently seen in the emergency department for similar concerns and she had CT of the chest without evidence of acute pathology.  She came again with worsening of her symptoms.  She has been having cough, chest tightness and shortness of breath as well as wheezing.  She denies any leg swelling or pain.  She is not currently smoking cigarettes.  She has oxygen at home to be used as needed.  Otherwise no fever or chills.  She was vaccinated against COVID-19 infection but unfortunately was not boosted.  Evaluation in the emergency department revealed respiratory distress and failure for which she required BiPAP treatment and admission to the hospital was requested.             Past Medical History:     Past Medical History:   Diagnosis Date     Anxiety      COPD (chronic obstructive pulmonary disease)      Hypercholesterolemia      Hypertension      Hypothyroidism             Past Surgical History:     Past Surgical History:   Procedure Laterality Date     CHOLECYSTECTOMY       INCISION AND DRAINAGE MANDIBLE, COMBINED Left 01/10/2022    Procedure: INCISION AND DRAINAGE, MANDIBLE;  Surgeon: Jn Feliz DDS;  Location: [facility] OR     INCISION AND DRAINAGE MANDIBLE, COMBINED Left 02/04/2022    Procedure: INCISION AND DRAINAGE, MANDIBLE;  Surgeon: Taylor Ortez DDS;  Location: [facility] OR     TOOTH EXTRACTION               Social History:     Social History     Tobacco Use     Smoking status: Current Every Day Smoker     Packs/day: 1.00     Smokeless tobacco: Never Used   Substance Use Topics     Alcohol use: Yes     Comment: occ             Family History:   Reviewed and non contributory        Allergies:     Allergies   Allergen Reactions     Sulfa Drugs      Penicillins Rash     Generalized rash  Rash, Generalized               Medications:         Prior to Admission medications    Medication Sig Last Dose Taking? Auth Provider   albuterol (PROAIR HFA/PROVENTIL HFA/VENTOLIN HFA) 108 (90 Base) MCG/ACT inhaler Inhale 2 puffs into the lungs every 4 hours as needed for shortness of breath / dyspnea or wheezing Inhale 1-2 Puffs every 4 hours as needed for Wheezing.   Tio Kessler MD   albuterol (PROVENTIL) (2.5 MG/3ML) 0.083% neb solution Take 1 vial (2.5 mg) by nebulization every 4 hours as needed for shortness of breath / dyspnea or wheezing   Kody Prieto DO   atorvastatin (LIPITOR) 20 MG tablet Take 20 mg by mouth daily   Reported, Patient   FLOVENT  MCG/ACT inhaler Inhale 2 puffs into the lungs 2 times daily    Reported, Patient   levothyroxine (SYNTHROID/LEVOTHROID) 112 MCG tablet Take 112 mcg by mouth daily   Reported, Patient   losartan (COZAAR) 25 MG tablet Take 1 tablet (25 mg) by mouth daily   Kody Prieto DO   PARoxetine (PAXIL) 10 MG tablet Take 1 tablet by mouth daily   Reported, Patient   predniSONE (DELTASONE) 20 MG tablet Take two tablets (= 40mg) each day for 5 (five) days   Galo Evans MD   predniSONE (DELTASONE) 20 MG tablet Take 3 tabs by mouth daily x 3 days, then 2 tabs daily x 3 days, then 1 tab daily x 3 days, then 1/2 tab daily x 3 days.   Kody Prieto DO          Review of Systems:     A Comprehensive greater than 10 system review of systems was carried out.  Pertinent positives and negatives are noted above in HPI.  Otherwise negative for contributory information.           Physical Exam:     Vital signs were reviewed    Temp:  [98.2  F (36.8  C)] 98.2  F (36.8  C)  Pulse:  [112-148] 113  Resp:  [10-28] 11  BP: (130-175)/() 133/89  FiO2 (%):  [30 %] 30 %  SpO2:  [94 %-99 %] 98 %        GEN: awake, alert, cooperative, no apparent distress, oriented x 3    NECK:Supple ,no mass or thyromegaly     HEENT:  Normocephalic/atraumatic, no scleral icterus, no nasal discharge, mouth moist.    CV:   Regular rate and rhythm, no murmur or JVD.  S1 + S2 noted, no S3 or S4.    LUNGS: Slightly increased work of breathing.  Diffusely wheezing expiratory and fairly bilaterally.  No crackles.  ABD:  Active bowel sounds, soft, non-tender/non-distended.  No rebound/guarding/rigidity.    EXT:  No edema.  No cyanosis.  No joint synovitis noted.Lower extremity pulses are normal bilaterally and     LGS: No cervical or axillary lymphadenopathy     SKIN:  Dry to touch, warm ,no exanthems noted in the visualized areas.    Neurologic:Grossly intact,non focal . No acute focal neurologic deficit     Psychaitric exam: Mood and affect normal                  Data:       All laboratory and imaging data in the past 24 hours reviewed     Results for orders placed or performed during the hospital encounter of 05/14/22   XR Chest Port 1 View     Status: None (Preliminary result)    Narrative    EXAM: XR CHEST PORTABLE 1 VIEW  LOCATION: [facility]  DATE/TIME: 05/14/2022, 1:24 PM    INDICATION: Dyspnea.  COMPARISON: 05/10/2022.      Impression    IMPRESSION: Negative chest.     [place] Draw     Status: None    Narrative    The following orders were created for panel order [place] Draw.  Procedure                               Abnormality         Status                     ---------                               -----------         ------                     Extra Blue Top Tube[[id number]]                              Final result               Extra Red Top Tube[[id number]]                               Final result                 Please view results for these tests on the individual orders.   Basic metabolic panel (BMP)     Status: Abnormal   Result Value Ref Range    Sodium 139 133 - 144 mmol/L    Potassium 4.5 3.4 - 5.3 mmol/L    Chloride 107 94 - 109 mmol/L    Carbon Dioxide (CO2) 27 20 - 32 mmol/L    Anion Gap 5 3 - 14 mmol/L    Urea Nitrogen 11 7 - 30 mg/dL    Creatinine 0.59 0.52 - 1.04 mg/dL    Calcium 8.6 8.5  - 10.1 mg/dL    Glucose 120 (H) 70 - 99 mg/dL    GFR Estimate >90 >60 mL/min/1.73m2   Extra Blue Top Tube     Status: None   Result Value Ref Range    Hold Specimen JIC    Extra Red Top Tube     Status: None   Result Value Ref Range    Hold Specimen JIC    CBC with platelets and differential     Status: Abnormal   Result Value Ref Range    WBC Count 8.5 4.0 - 11.0 10e3/uL    RBC Count 3.81 3.80 - 5.20 10e6/uL    Hemoglobin 14.5 11.7 - 15.7 g/dL    Hematocrit 39.6 35.0 - 47.0 %     (H) 78 - 100 fL    MCH 38.1 (H) 26.5 - 33.0 pg    MCHC 36.6 (H) 31.5 - 36.5 g/dL    RDW 13.8 10.0 - 15.0 %    Platelet Count 220 150 - 450 10e3/uL    % Neutrophils 65 %    % Lymphocytes 23 %    % Monocytes 9 %    % Eosinophils 1 %    % Basophils 0 %    % Immature Granulocytes 2 %    NRBCs per 100 WBC 0 <1 /100    Absolute Neutrophils 5.4 1.6 - 8.3 10e3/uL    Absolute Lymphocytes 2.0 0.8 - 5.3 10e3/uL    Absolute Monocytes 0.7 0.0 - 1.3 10e3/uL    Absolute Eosinophils 0.1 0.0 - 0.7 10e3/uL    Absolute Basophils 0.0 0.0 - 0.2 10e3/uL    Absolute Immature Granulocytes 0.2 <=0.4 10e3/uL    Absolute NRBCs 0.0 10e3/uL   Comprehensive metabolic panel     Status: Abnormal   Result Value Ref Range    Sodium 139 133 - 144 mmol/L    Potassium 4.5 3.4 - 5.3 mmol/L    Chloride 107 94 - 109 mmol/L    Carbon Dioxide (CO2) 27 20 - 32 mmol/L    Anion Gap 5 3 - 14 mmol/L    Urea Nitrogen 11 7 - 30 mg/dL    Creatinine 0.59 0.52 - 1.04 mg/dL    Calcium 8.6 8.5 - 10.1 mg/dL    Glucose 120 (H) 70 - 99 mg/dL    Alkaline Phosphatase 104 40 - 150 U/L    AST 49 (H) 0 - 45 U/L    ALT 51 (H) 0 - 50 U/L    Protein Total 7.9 6.8 - 8.8 g/dL    Albumin 3.5 3.4 - 5.0 g/dL    Bilirubin Total 0.5 0.2 - 1.3 mg/dL    GFR Estimate >90 >60 mL/min/1.73m2   Troponin I     Status: Normal   Result Value Ref Range    Troponin I High Sensitivity 8 <54 ng/L   Nt probnp inpatient (BNP)     Status: Normal   Result Value Ref Range    N terminal Pro BNP Inpatient 178 0 - 900 pg/mL    D dimer quantitative     Status: Abnormal   Result Value Ref Range    D-Dimer Quantitative 1.47 (H) 0.00 - 0.50 ug/mL FEU    Narrative    This D-dimer assay is intended for use in conjunction with a clinical pretest probability assessment model to exclude pulmonary embolism (PE) and deep venous thrombosis (DVT) in outpatients suspected of PE or DVT. The cut-off value is 0.50 ug/mL FEU.   Blood gas venous and oxyhgb     Status: Abnormal   Result Value Ref Range    pH Venous 7.31 (L) 7.32 - 7.43    pCO2 Venous 56 (H) 40 - 50 mm Hg    pO2 Venous 70 (H) 25 - 47 mm Hg    Bicarbonate Venous 28 21 - 28 mmol/L    FIO2 30     Oxyhemoglobin Venous 93 (H) 70 - 75 %    Base Excess/Deficit (+/-) 0.4 -7.7 - 1.9 mmol/L   Lactic acid whole blood     Status: Normal   Result Value Ref Range    Lactic Acid 0.8 0.7 - 2.0 mmol/L   Extra Tube     Status: None    Narrative    The following orders were created for panel order Extra Tube.  Procedure                               Abnormality         Status                     ---------                               -----------         ------                     Extra Green Top (Lithium...[[id number]]                      Final result                 Please view results for these tests on the individual orders.   Extra Green Top (Lithium Heparin) Tube     Status: None   Result Value Ref Range    Hold Specimen [facility]    RBC and Platelet Morphology     Status: None   Result Value Ref Range    Platelet Assessment  Automated Count Confirmed. Platelet morphology is normal.     Automated Count Confirmed. Platelet morphology is normal.    RBC Morphology Confirmed RBC Indices    Blood gas venous and oxyhgb     Status: Abnormal   Result Value Ref Range    pH Venous 7.40 7.32 - 7.43    pCO2 Venous 41 40 - 50 mm Hg    pO2 Venous 42 25 - 47 mm Hg    Bicarbonate Venous 25 21 - 28 mmol/L    FIO2 30     Oxyhemoglobin Venous 78 (H) 70 - 75 %    Base Excess/Deficit (+/-) 0.2 -7.7 - 1.9 mmol/L   Symptomatic;  Yes; 4/30/2022 Influenza A/B & SARS-CoV2 (COVID-19) Virus PCR Multiplex Nasopharyngeal     Status: Abnormal    Specimen: Nasopharyngeal; Swab   Result Value Ref Range    Influenza A PCR Negative Negative    Influenza B PCR Negative Negative    RSV PCR Negative Negative    SARS CoV2 PCR Positive (A) Negative    Narrative    Testing was performed using the Xpert Xpress CoV2/Flu/RSV Assay on the [employer] GeneXpert Instrument. This test should be ordered for the detection of SARS-CoV-2 and influenza viruses in individuals who meet clinical and/or epidemiological criteria. Test performance is unknown in asymptomatic patients. This test is for in vitro diagnostic use under the FDA EUA for laboratories certified under CLIA to perform high or moderate complexity testing. This test has not been FDA cleared or approved. A negative result does not rule out the presence of PCR inhibitors in the specimen or target RNA in concentration below the limit of detection for the assay. If only one viral target is positive but coinfection with multiple targets is suspected, the sample should be re-tested with another FDA cleared, approved, or authorized test, if coinfection would change clinical management. This test was validated by the [facility]. These laboratories are certified under the Clinical  Laboratory Improvement Amendments of 1988 (CLIA-88) as qualified to perform high complexity laboratory testing.   EKG 12-lead, tracing only     Status: None (Preliminary result)   Result Value Ref Range    Systolic Blood Pressure  mmHg    Diastolic Blood Pressure  mmHg    Ventricular Rate 139 BPM    Atrial Rate 139 BPM    NE Interval 144 ms    QRS Duration 70 ms     ms    QTc 453 ms    P Axis  degrees    R AXIS 60 degrees    T Axis -54 degrees    Interpretation ECG       Sinus tachycardia with Premature atrial complexes  Nonspecific ST and T wave abnormality  Abnormal ECG  When compared with ECG of 10-MAY-2022  14:43,  Premature atrial complexes are now Present  Nonspecific T wave abnormality, worse in Inferior leads  T wave inversion now evident in Lateral leads     CBC + differential     Status: Abnormal    Narrative    The following orders were created for panel order CBC + differential.  Procedure                               Abnormality         Status                     ---------                               -----------         ------                     CBC with platelets and d...[[id number]]  Abnormal            Final result               RBC and Platelet Morphology[[id number]]                      Final result                 Please view results for these tests on the individual orders.            Recent Results (from the past 48 hour(s))   XR Chest Port 1 View    Narrative    EXAM: XR CHEST PORTABLE 1 VIEW  LOCATION: [facility]  DATE/TIME: 05/14/2022, 1:24 PM    INDICATION: Dyspnea.  COMPARISON: 05/10/2022.      Impression    IMPRESSION: Negative chest.         EKG results: Sinus tachycardia           All imaging studies reviewed by me.         Patient`s old medical records reviewed and case discussed with the ED physician.    ED course-Reviewed  and care plan discussed with

## 2022-05-14 NOTE — ED PROVIDER NOTES
History   Chief Complaint:  Shortness of Breath     HPI   Lara Melendez is a 56 year old female with history of COPD, hypertension, and hyperlipidemia who presents with about 2 weeks of shortness of breath, cough, and chest tightness. She was seen in the ED on 5/10 and tested positive for COVID-19. She had CT PE study (see impression below) and was prescribed prednisone. She has been using her inhalers daily to manage her shortness of breath. In the past 24 hours her work of breathing has increased, prompting her to call the ambulance. She used 2-3 nebs this morning at home and got 1 DuoNeb per EMS. Her heart rate was in the 140s-150s and her blood pressure was 120/110. Denies chest pain and abdominal pain. No history of heart problems.     CT chest pulmonary embolism w contrast (5/10/2022):  1.  No pulmonary embolus, aortic dissection, or aneurysm.  2.  Emphysema.  3.  Calcified granulomata consistent with prior granulomatous disease/infection.  Report per radiology.     Review of Systems   Respiratory: Positive for cough, chest tightness, shortness of breath and wheezing.    Cardiovascular: Negative for chest pain.   Gastrointestinal: Negative for abdominal pain.   All other systems reviewed and are negative.    Allergies:  Sulfa Drugs  Penicillins    Medications:  Albuterol  Lipitor  Synthroid  Flovent  Cozaar  Paxil  Deltasone  Proventil  Tenormin    Past Medical History:     Anxiety   COPD  Hypercholesterolemia  Hypertension  Hypothyroidism   Hyperlipidemia  Diverticulitis  Psoriasis  Tobacco use disorder    Past Surgical History:    Cholecystectomy  Incision and drainage mandible, combined, left (x2)  Tooth extraction    Throat surgery procedure    Family History:    Father: stomach cancer  Mother: DVT/ PE, hypertension, thyroid disorder    Daughter: Factor V Leiden    Social History:  The patient presents to the ED via ambulance.   PCP: Chyna Rust PA-C.   The patient is a former smoker.      Physical Exam     Patient Vitals for the past 24 hrs:   BP Temp Temp src Pulse Resp SpO2   05/14/22 1345 -- -- -- 113 11 98 %   05/14/22 1330 133/89 -- -- 116 14 97 %   05/14/22 1315 130/85 -- -- 112 11 98 %   05/14/22 1310 -- -- -- 112 11 99 %   05/14/22 1305 -- -- -- 115 18 98 %   05/14/22 1300 (!) 133/93 -- -- 113 -- 98 %   05/14/22 1255 -- -- -- 115 10 99 %   05/14/22 1245 -- -- -- (!) 137 11 98 %   05/14/22 1230 -- -- -- (!) 138 17 97 %   05/14/22 1228 -- -- -- (!) 145 26 94 %   05/14/22 1214 (!) 175/112 98.2  F (36.8  C) Oral (!) 148 28 97 %       Physical Exam  General: Adult female sitting upright  Eyes: PERRL, Conjunctive within normal limits  ENT: Moist mucous membranes, oropharynx clear.   CV: Normal S1S2, no murmur, rub or gallop.  Tachycardic, regular.  Resp: Coarse wheezing diffusely with prolonged expiratory phase.  Increased work of breathing.  Use of accessory muscles to breathe.  Able to speak in short sentences.    GI: Abdomen is soft, nontender and nondistended. No palpable masses. No rebound or guarding.  MSK: No edema. Nontender. Normal active range of motion.  Skin: Warm and dry. No rashes or lesions or ecchymoses on visible skin.  Neuro: Alert and oriented. Responds appropriately to all questions and commands. No focal findings appreciated. Normal muscle tone.  Psych: Appropriate mood and affect.    Emergency Department Course   ECG  ECG taken at 1224, ECG read at 1234  Sinus tachycardia with premature atrial complexes. Nonspecific ST and T wave abnormality. Abnormal ECG.    Rate 139 bpm. ID interval 144 ms. QRS duration 70 ms. QT/QTc 298/453 ms. P-R-T axes * 60 -54.     ECG results from 05/10/22   EKG 12-lead, tracing only     Value    Systolic Blood Pressure     Diastolic Blood Pressure     Ventricular Rate 99    Atrial Rate 99    ID Interval 134    QRS Duration 72        QTc 441    P Axis 53    R AXIS 69    T Axis 31    Interpretation ECG      Sinus rhythm with sinus  arrhythmia  Normal ECG  When compared with ECG of 21-FEB-2022 14:08,  No significant change was found         Imaging:  XR Chest Port 1 View   Preliminary Result   IMPRESSION: Negative chest.           Report per radiology    Laboratory:  Labs Ordered and Resulted from Time of ED Arrival to Time of ED Departure   BASIC METABOLIC PANEL - Abnormal       Result Value    Sodium 139      Potassium 4.5      Chloride 107      Carbon Dioxide (CO2) 27      Anion Gap 5      Urea Nitrogen 11      Creatinine 0.59      Calcium 8.6      Glucose 120 (*)     GFR Estimate >90     CBC WITH PLATELETS AND DIFFERENTIAL - Abnormal    WBC Count 8.5      RBC Count 3.81      Hemoglobin 14.5      Hematocrit 39.6       (*)     MCH 38.1 (*)     MCHC 36.6 (*)     RDW 13.8      Platelet Count 220      % Neutrophils 65      % Lymphocytes 23      % Monocytes 9      % Eosinophils 1      % Basophils 0      % Immature Granulocytes 2      NRBCs per 100 WBC 0      Absolute Neutrophils 5.4      Absolute Lymphocytes 2.0      Absolute Monocytes 0.7      Absolute Eosinophils 0.1      Absolute Basophils 0.0      Absolute Immature Granulocytes 0.2      Absolute NRBCs 0.0     COMPREHENSIVE METABOLIC PANEL - Abnormal    Sodium 139      Potassium 4.5      Chloride 107      Carbon Dioxide (CO2) 27      Anion Gap 5      Urea Nitrogen 11      Creatinine 0.59      Calcium 8.6      Glucose 120 (*)     Alkaline Phosphatase 104      AST 49 (*)     ALT 51 (*)     Protein Total 7.9      Albumin 3.5      Bilirubin Total 0.5      GFR Estimate >90     D DIMER QUANTITATIVE - Abnormal    D-Dimer Quantitative 1.47 (*)    BLOOD GAS VENOUS WITH OXYHEMOGLOBIN - Abnormal    pH Venous 7.31 (*)     pCO2 Venous 56 (*)     pO2 Venous 70 (*)     Bicarbonate Venous 28      FIO2 30      Oxyhemoglobin Venous 93 (*)     Base Excess/Deficit (+/-) 0.4     TROPONIN I - Normal    Troponin I High Sensitivity 8     NT PROBNP INPATIENT - Normal    N terminal Pro BNP Inpatient 178      LACTIC ACID WHOLE BLOOD - Normal    Lactic Acid 0.8     RBC AND PLATELET MORPHOLOGY    Platelet Assessment        Value: Automated Count Confirmed. Platelet morphology is normal.    RBC Morphology Confirmed RBC Indices     BLOOD GAS VENOUS WITH OXYHEMOGLOBIN          Emergency Department Course:    Reviewed:  I reviewed nursing notes, vitals, past medical history and Care Everywhere    Assessments:  1212 I obtained history and examined the patient as noted above.   1247 I rechecked the patient and explained findings. She is looking a lot better on BIPAP and reports she is feeling a lot better.   1323 I rechecked the patient. When we tried to take her off BIPAP her work of breathing and heart rate increased and she failed the trial off BIPAP.     Consults:  I discussed admission with Dr. Devine of the hospitalist service who accepts her to his care.     Interventions:  1231 DuoNeb 3mL nebulization  1234 0.9% sodium chloride BOLUS 1000mL IV     Disposition:  The patient was admitted to the hospital under the care of Dr. Devine    Impression & Plan       Medical Decision Making:  Lara Melendez is a 56 year old female with a recent diagnosis of COPD and COVID who presents for evaluation difficulty breathing.  She was in respiratory distress on arrival.  BiPAP was initiated she had markedly improved work of breathing.  Signs and symptoms are consistent with COPD exacerbation, likely complicated by COVID infection.  She had no focal findings on x-ray to suggest bacterial pneumonia.  PE study had been performed 4 days prior and did not show evidence of pulmonary embolism.  Although D-dimer was slightly elevated from 4 days ago, suspicion for pulmonary embolism is still low, however CT chest with IV contrast considered if she fails to make significant provement.  She has no clinical evidence of DVT. Patient feels improved after interventions here in ED while on BiPAP but continues to have impairment in respiratory  function including significantly worsening in respiratory function with trial of removing BiPAP.  Given this, I will hospitalize for further intervention.      There are no signs at this point of any other serious etiologies including those mentioned above especially acute coronary syndrome. I doubt this is ACS given the classic story of COPD exacerbation given by the patient, the marked wheezing without rales and no findings of acute ischemia or injury on ECG.      Critical Care Time: was 36 minutes for this patient excluding procedures    Diagnosis:    ICD-10-CM    1. Acute respiratory failure with hypercapnia (H)  J96.02    2. COPD with acute exacerbation (H)  J44.1    3. Infection due to 2019 novel coronavirus  U07.1          Scribe Disclosure:  I, Nicole Jacobson, am serving as a scribe at 12:12 PM on 5/14/2022 to document services personally performed by Jessica Hauser MD based on my observations and the provider's statements to me.      Jessica Hauser MD  05/14/22 2169

## 2022-05-14 NOTE — PROGRESS NOTES
Weaned BIPAP off to 4L oxymizer.  Patient is tolerating it well.  BiPAP was moved to patient room in case of increased dyspnea.    Margaret Peña, RT

## 2022-05-14 NOTE — ED NOTES
[facility]  ED Nurse Handoff Report    Lara Melendez is a 56 year old female   ED Chief complaint: Shortness of Breath  . ED Diagnosis:   Final diagnoses:   Acute respiratory failure with hypercapnia (H)   COPD with acute exacerbation (H)   Infection due to 2019 novel coronavirus     Allergies:   Allergies   Allergen Reactions     Sulfa Drugs      Penicillins Rash     Generalized rash  Rash, Generalized         Code Status: Full Code  Activity level - Baseline/Home:  Independent. Activity Level - Current:   Assist X 1. Lift room needed: No. Bariatric: No   Needed: No   Isolation: Yes. Infection: Not Applicable  COVID r/o and special precautions.     Vital Signs:   Vitals:    05/14/22 1310 05/14/22 1315 05/14/22 1330 05/14/22 1345   BP:  130/85 133/89    Pulse: 112 112 116 113   Resp: 11 11 14 11   Temp:       TempSrc:       SpO2: 99% 98% 97% 98%       Cardiac Rhythm:  ,      Pain level:    Patient confused: No. Patient Falls Risk: Yes.   Elimination Status: Has voided   Patient Report - Initial Complaint: SOB. Focused Assessment:  Lara Melendez is a 56 year old female with history of COPD, hypertension, and hyperlipidemia who presents with about 2 weeks of shortness of breath, cough, and chest tightness. She was seen in the ED on 5/10 and tested positive for COVID-19. She had CT PE study (see impression below) and was prescribed prednisone. She has been using her inhalers daily to manage her shortness of breath. In the past 24 hours her work of breathing has increased, prompting her to call the ambulance. She used 2-3 nebs this morning at home and got 1 DuoNeb per EMS. Her heart rate was in the 140s-150s and her blood pressure was 120/110. Denies chest pain and abdominal pain. No history of heart problems.   Tests Performed: EKG, Labs, Imaging. Abnormal Results:   Labs Ordered and Resulted from Time of ED Arrival to Time of ED Departure   BASIC METABOLIC PANEL - Abnormal       Result  Value    Sodium 139      Potassium 4.5      Chloride 107      Carbon Dioxide (CO2) 27      Anion Gap 5      Urea Nitrogen 11      Creatinine 0.59      Calcium 8.6      Glucose 120 (*)     GFR Estimate >90     CBC WITH PLATELETS AND DIFFERENTIAL - Abnormal    WBC Count 8.5      RBC Count 3.81      Hemoglobin 14.5      Hematocrit 39.6       (*)     MCH 38.1 (*)     MCHC 36.6 (*)     RDW 13.8      Platelet Count 220      % Neutrophils 65      % Lymphocytes 23      % Monocytes 9      % Eosinophils 1      % Basophils 0      % Immature Granulocytes 2      NRBCs per 100 WBC 0      Absolute Neutrophils 5.4      Absolute Lymphocytes 2.0      Absolute Monocytes 0.7      Absolute Eosinophils 0.1      Absolute Basophils 0.0      Absolute Immature Granulocytes 0.2      Absolute NRBCs 0.0     COMPREHENSIVE METABOLIC PANEL - Abnormal    Sodium 139      Potassium 4.5      Chloride 107      Carbon Dioxide (CO2) 27      Anion Gap 5      Urea Nitrogen 11      Creatinine 0.59      Calcium 8.6      Glucose 120 (*)     Alkaline Phosphatase 104      AST 49 (*)     ALT 51 (*)     Protein Total 7.9      Albumin 3.5      Bilirubin Total 0.5      GFR Estimate >90     D DIMER QUANTITATIVE - Abnormal    D-Dimer Quantitative 1.47 (*)    BLOOD GAS VENOUS WITH OXYHEMOGLOBIN - Abnormal    pH Venous 7.31 (*)     pCO2 Venous 56 (*)     pO2 Venous 70 (*)     Bicarbonate Venous 28      FIO2 30      Oxyhemoglobin Venous 93 (*)     Base Excess/Deficit (+/-) 0.4     BLOOD GAS VENOUS WITH OXYHEMOGLOBIN - Abnormal    pH Venous 7.40      pCO2 Venous 41      pO2 Venous 42      Bicarbonate Venous 25      FIO2 30      Oxyhemoglobin Venous 78 (*)     Base Excess/Deficit (+/-) 0.2     TROPONIN I - Normal    Troponin I High Sensitivity 8     NT PROBNP INPATIENT - Normal    N terminal Pro BNP Inpatient 178     LACTIC ACID WHOLE BLOOD - Normal    Lactic Acid 0.8     RBC AND PLATELET MORPHOLOGY    Platelet Assessment        Value: Automated Count Confirmed.  Platelet morphology is normal.    RBC Morphology Confirmed RBC Indices     INFLUENZA A/B & SARS-COV2 PCR MULTIPLEX     XR Chest Port 1 View   Preliminary Result   IMPRESSION: Negative chest.           .   Treatments provided: See MAR  Family Comments: None  OBS brochure/video discussed/provided to patient:  No  ED Medications:   Medications   HOLD: All Oral Medications (has no administration in time range)   0.9% sodium chloride BOLUS (0 mLs Intravenous Stopped 5/14/22 1408)   ipratropium - albuterol 0.5 mg/2.5 mg/3 mL (DUONEB) neb solution 3 mL (3 mLs Nebulization Given 5/14/22 1231)     Drips infusing:  No  For the majority of the shift, the patient's behavior Green. Interventions performed were N/A.    Sepsis treatment initiated: No     Patient tested for COVID 19 prior to admission: YES    ED Nurse Name/Phone Number: Tara Lovell RN,   2:45 PM    RECEIVING UNIT ED HANDOFF REVIEW    Above ED Nurse Handoff Report was reviewed: Yes  Reviewed by: Laurita Soliz RN on May 14, 2022 at 3:45 PM

## 2022-05-14 NOTE — PHARMACY-ADMISSION MEDICATION HISTORY
Admission medication history interview status for this patient is complete. See [facility] admission navigator for allergy information, prior to admission medications and immunization status.     Medication history interview done, indicate source(s): Patient  Medication history resources (including written lists, pill bottles, clinic record):None      Changes made to PTA medication list:  Added: advair 230-21 HFA  Changed: none  Reported as Not Taking: none  Removed: flovent    Actions taken by pharmacist (provider contacted, etc):None     Additional medication history information:None    Medication reconciliation/reorder completed by provider prior to medication history?  n   (Y/N)     For patients on insulin therapy:   Do you use sliding scale insulin based on blood sugars?   What is your pre-meal insulin coverage?    Do you typically eat three meals a day?   How many times do you check your blood glucose per day?   How many episodes of hypoglycemia do you typically have per month?   Do you have a Continuous Glucose Monitor (CGM)?      Prior to Admission medications    Medication Sig Last Dose Taking? Auth Provider   albuterol (PROAIR HFA/PROVENTIL HFA/VENTOLIN HFA) 108 (90 Base) MCG/ACT inhaler Inhale 2 puffs into the lungs every 4 hours as needed for shortness of breath / dyspnea or wheezing Inhale 1-2 Puffs every 4 hours as needed for Wheezing.  Yes Tio Kessler MD   albuterol (PROVENTIL) (2.5 MG/3ML) 0.083% neb solution Take 1 vial (2.5 mg) by nebulization every 4 hours as needed for shortness of breath / dyspnea or wheezing  Yes Kody Prieto,    atorvastatin (LIPITOR) 20 MG tablet Take 20 mg by mouth daily 5/13/2022 at Unknown time Yes Reported, Patient   fluticasone-salmeterol (ADVAIR-HFA) 230-21 MCG/ACT inhaler Inhale 2 puffs into the lungs 2 times daily  Yes Unknown, Entered By History   levothyroxine (SYNTHROID/LEVOTHROID) 112 MCG tablet Take 112 mcg by mouth daily 5/13/2022 at Unknown time Yes  Reported, Patient   losartan (COZAAR) 25 MG tablet Take 1 tablet (25 mg) by mouth daily 5/13/2022 at Unknown time Yes Kody Prieto DO   PARoxetine (PAXIL) 10 MG tablet Take 1 tablet by mouth daily 5/13/2022 at Unknown time Yes Reported, Patient   predniSONE (DELTASONE) 20 MG tablet Take two tablets (= 40mg) each day for 5 (five) days 5/13/2022 at Unknown time Yes Galo Evans MD

## 2022-05-15 ENCOUNTER — APPOINTMENT (OUTPATIENT)
Dept: CARDIOLOGY | Facility: CLINIC | Age: 57
End: 2022-05-15
Attending: INTERNAL MEDICINE
Payer: COMMERCIAL

## 2022-05-15 LAB
ALBUMIN SERPL-MCNC: 3 G/DL (ref 3.4–5)
ALP SERPL-CCNC: 89 U/L (ref 40–150)
ALT SERPL W P-5'-P-CCNC: 42 U/L (ref 0–50)
ANION GAP SERPL CALCULATED.3IONS-SCNC: 5 MMOL/L (ref 3–14)
AST SERPL W P-5'-P-CCNC: 27 U/L (ref 0–45)
BILIRUB SERPL-MCNC: 0.8 MG/DL (ref 0.2–1.3)
BUN SERPL-MCNC: 10 MG/DL (ref 7–30)
CALCIUM SERPL-MCNC: 8.6 MG/DL (ref 8.5–10.1)
CHLORIDE BLD-SCNC: 107 MMOL/L (ref 94–109)
CO2 SERPL-SCNC: 24 MMOL/L (ref 20–32)
CREAT SERPL-MCNC: 0.47 MG/DL (ref 0.52–1.04)
CRP SERPL-MCNC: 3.6 MG/L (ref 0–8)
D DIMER PPP FEU-MCNC: 0.97 UG/ML FEU (ref 0–0.5)
ERYTHROCYTE [DISTWIDTH] IN BLOOD BY AUTOMATED COUNT: 13.7 % (ref 10–15)
GFR SERPL CREATININE-BSD FRML MDRD: >90 ML/MIN/1.73M2
GLUCOSE BLD-MCNC: 138 MG/DL (ref 70–99)
HCT VFR BLD AUTO: 36.5 % (ref 35–47)
HGB BLD-MCNC: 13.4 G/DL (ref 11.7–15.7)
LVEF ECHO: NORMAL
MAGNESIUM SERPL-MCNC: 2.2 MG/DL (ref 1.6–2.3)
MCH RBC QN AUTO: 38.6 PG (ref 26.5–33)
MCHC RBC AUTO-ENTMCNC: 36.7 G/DL (ref 31.5–36.5)
MCV RBC AUTO: 105 FL (ref 78–100)
PLATELET # BLD AUTO: 175 10E3/UL (ref 150–450)
POTASSIUM BLD-SCNC: 4.1 MMOL/L (ref 3.4–5.3)
PROT SERPL-MCNC: 6.8 G/DL (ref 6.8–8.8)
RBC # BLD AUTO: 3.47 10E6/UL (ref 3.8–5.2)
SODIUM SERPL-SCNC: 136 MMOL/L (ref 133–144)
WBC # BLD AUTO: 4.3 10E3/UL (ref 4–11)

## 2022-05-15 PROCEDURE — 83735 ASSAY OF MAGNESIUM: CPT | Performed by: INTERNAL MEDICINE

## 2022-05-15 PROCEDURE — 120N000001 HC R&B MED SURG/OB

## 2022-05-15 PROCEDURE — 93306 TTE W/DOPPLER COMPLETE: CPT

## 2022-05-15 PROCEDURE — 36415 COLL VENOUS BLD VENIPUNCTURE: CPT | Performed by: INTERNAL MEDICINE

## 2022-05-15 PROCEDURE — 85379 FIBRIN DEGRADATION QUANT: CPT | Performed by: INTERNAL MEDICINE

## 2022-05-15 PROCEDURE — 99233 SBSQ HOSP IP/OBS HIGH 50: CPT | Performed by: INTERNAL MEDICINE

## 2022-05-15 PROCEDURE — 93306 TTE W/DOPPLER COMPLETE: CPT | Mod: 26 | Performed by: INTERNAL MEDICINE

## 2022-05-15 PROCEDURE — 250N000011 HC RX IP 250 OP 636: Performed by: INTERNAL MEDICINE

## 2022-05-15 PROCEDURE — 94640 AIRWAY INHALATION TREATMENT: CPT

## 2022-05-15 PROCEDURE — 250N000013 HC RX MED GY IP 250 OP 250 PS 637: Performed by: INTERNAL MEDICINE

## 2022-05-15 PROCEDURE — 85027 COMPLETE CBC AUTOMATED: CPT | Performed by: INTERNAL MEDICINE

## 2022-05-15 PROCEDURE — 94640 AIRWAY INHALATION TREATMENT: CPT | Mod: 76

## 2022-05-15 PROCEDURE — 86140 C-REACTIVE PROTEIN: CPT | Performed by: INTERNAL MEDICINE

## 2022-05-15 PROCEDURE — 999N000157 HC STATISTIC RCP TIME EA 10 MIN

## 2022-05-15 PROCEDURE — 80053 COMPREHEN METABOLIC PANEL: CPT | Performed by: INTERNAL MEDICINE

## 2022-05-15 RX ADMIN — PAROXETINE HYDROCHLORIDE 10 MG: 10 TABLET, FILM COATED ORAL at 09:38

## 2022-05-15 RX ADMIN — BENZONATATE 100 MG: 100 CAPSULE ORAL at 00:35

## 2022-05-15 RX ADMIN — BENZONATATE 100 MG: 100 CAPSULE ORAL at 15:04

## 2022-05-15 RX ADMIN — METHYLPREDNISOLONE SODIUM SUCCINATE 40 MG: 40 INJECTION, POWDER, FOR SOLUTION INTRAMUSCULAR; INTRAVENOUS at 00:24

## 2022-05-15 RX ADMIN — LEVALBUTEROL TARTRATE 2 PUFF: 45 AEROSOL, METERED ORAL at 15:32

## 2022-05-15 RX ADMIN — LEVALBUTEROL TARTRATE 2 PUFF: 45 AEROSOL, METERED ORAL at 02:31

## 2022-05-15 RX ADMIN — ENOXAPARIN SODIUM 40 MG: 40 INJECTION SUBCUTANEOUS at 16:47

## 2022-05-15 RX ADMIN — METHYLPREDNISOLONE SODIUM SUCCINATE 40 MG: 40 INJECTION, POWDER, FOR SOLUTION INTRAMUSCULAR; INTRAVENOUS at 09:38

## 2022-05-15 RX ADMIN — FLUTICASONE FUROATE 1 PUFF: 200 POWDER RESPIRATORY (INHALATION) at 09:41

## 2022-05-15 RX ADMIN — ATORVASTATIN CALCIUM 20 MG: 20 TABLET, FILM COATED ORAL at 09:38

## 2022-05-15 RX ADMIN — LEVALBUTEROL TARTRATE 2 PUFF: 45 AEROSOL, METERED ORAL at 20:25

## 2022-05-15 RX ADMIN — GUAIFENESIN 600 MG: 600 TABLET, EXTENDED RELEASE ORAL at 09:38

## 2022-05-15 RX ADMIN — UMECLIDINIUM 1 PUFF: 62.5 AEROSOL, POWDER ORAL at 15:32

## 2022-05-15 RX ADMIN — LOSARTAN POTASSIUM 25 MG: 25 TABLET, FILM COATED ORAL at 09:38

## 2022-05-15 RX ADMIN — LEVOTHYROXINE SODIUM 112 MCG: 0.11 TABLET ORAL at 09:38

## 2022-05-15 RX ADMIN — METHYLPREDNISOLONE SODIUM SUCCINATE 40 MG: 40 INJECTION, POWDER, FOR SOLUTION INTRAMUSCULAR; INTRAVENOUS at 16:47

## 2022-05-15 RX ADMIN — GUAIFENESIN 600 MG: 600 TABLET, EXTENDED RELEASE ORAL at 21:56

## 2022-05-15 RX ADMIN — LEVALBUTEROL TARTRATE 2 PUFF: 45 AEROSOL, METERED ORAL at 09:42

## 2022-05-15 ASSESSMENT — ACTIVITIES OF DAILY LIVING (ADL)
ADLS_ACUITY_SCORE: 22

## 2022-05-15 NOTE — PROGRESS NOTES
[facility]  Hospitalist Progress Note  Galdino Devine M.D., M.B.A.   05/15/2022    Reason for Stay/active problem list    Acute respiratory failure-hypoxic and hypercapnic likely secondary to acute COPD    COVID-19 infection           Assessment and Plan:        Summary of Stay:   Lara Melendez is a 56 year old  female with a significant past medical history of COPD on as needed oxygen at home, hypertension, hyperlipidemia who presents with worsening shortness of breath.  Patient was seen on May 10 for shortness of breath and was diagnosed with COVID-19 infection and COPD exacerbation.  Patient's symptoms progressively got worse and she came to the emergency department for further evaluation.     On presentation to the emergency department, blood pressure 175/112, heart rate was 148, temp was 98.2, oxygen saturation 97%.  EKG showed sinus tachycardia with premature atrial complexes.  Chest x-ray was clear.  Initial VBG showed pH of 7.31, PCO2 56, PO2 70.  Normal bicarb     Patient was started on BiPAP therapy due to respiratory distress and hospitalist service was consulted for admission and further care.       Problem List with Assessment and Plan:   #1.  Acute respiratory failure-hypoxic and hypercapnic likely secondary to acute COPD exacerbation in the setting of recent COVID-19 infection.  -- Required BiPAP therapy on admission which was weaned off to nasal oxygen  --Treated with nebulized treatments, IV steroids and close monitoring  --Currently much better with improvement of shortness of breath and wheezing.  She still has dyspnea on exertion.  Continue current medications and supplemental oxygen.    #2.  Acute COPD exacerbation  --  Of BiPAP, continue treatment as above    #3.  COVID-19 infection  -- Patient has positive COVID test from May 10.  Patient does not appear to have pulmonary infiltrates.  She was vaccinated with Moderna but was not boosted  -- I doubt this is the  "primary reason for respiratory failure but definitely contributes to COPD exacerbation  -- Continue supplemental oxygen, continue steroid with methylprednisolone, I would not do remdesivir or other COVID-specific medications.  -- D-dimer is elevated but I doubt she has venous thromboembolism.  -- Continue to monitor COVID inflammatory markers such as CRP, D-dimer     #4.  Mild LFT mildly, monitor for now     #5.  Hypertension on atenolol and losartan  --  Uncontrolled on admission, improved   -- Continue home medications     #6.  Hyperlipidemia on statin     #7.  Hypothyroidism on levothyroxine     #8.  Anxiety on Paxil     #9.  Tachyarrhythmia: Patient has significant sinus tachycardia on admission and was noted to have episode of paroxysmal SVT.  Denies any chest pain but she has dyspnea on exertion.  -- No indication of acute coronary ischemia.  We will check echocardiogram and involve cardiology as needed    Plan for today:    Discontinue IMC orders    Continue to monitor on telemetry    Echocardiogram    Wean down oxygen as able      VTE Prophylaxis: Enoxaparin (Lovenox) SQ  Code Status: Full Code  Diet: Combination Diet Low Saturated Fat Na <2400mg Diet, No Caffeine Diet    Hines Catheter: Not present    Family updated today: No     Disposition: May discharge home in the next 2 days pending improvement of her shortness of breath        Interval History (Subjective):        Patient is seen and examined by me today and medical record reviewed.Overnight events noted and care discussed with nursing staff.  Patient is much better today.  She is off BiPAP.  Denies any chest pain but she has dyspnea on exertion.  He is diffusely wheezing but much better today.  Denies any other complaints                  Physical Exam:        Last Vital Signs:  BP (!) 140/82   Pulse 93   Temp 97.6  F (36.4  C) (Oral)   Resp 20   Ht 1.6 m (5' 3\")   Wt 61.6 kg (135 lb 12.8 oz)   SpO2 93%   BMI 24.06 kg/m      I/O last 3 " completed shifts:  In: 1274 [P.O.:1274]  Out: 1150 [Urine:1150]    Wt Readings from Last 5 Encounters:   05/15/22 61.6 kg (135 lb 12.8 oz)   04/02/22 58.9 kg (129 lb 12.8 oz)   02/21/22 59 kg (130 lb)   02/04/22 59 kg (130 lb)   02/04/22 59 kg (130 lb)        Constitutional: Awake, alert, cooperative, no apparent distress     Respiratory:  No increased work of breathing at rest.  She has diffuse wheezing but much better than on admission   Cardiovascular: Regular rate and rhythm, normal S1 and S2, and no murmur noted   Abdomen: Normal bowel sounds, soft, non-distended, non-tender   Skin: No new rashes, no cyanosis, dry to touch   Neuro: Alert with  no new focal weakness   Extremities: No edema   Other(s):        All other systems: Negative          Medications:        All current medications were reviewed with changes reflected in problem list.         Data:      All new lab and imaging data was reviewed.      Data reviewed today: I reviewed all new labs and imaging results over the last 24 hours. I personally reviewed no images or EKG's today      Recent Labs   Lab 05/15/22  0639 05/14/22  1648 05/14/22  1218   WBC 4.3 8.6 8.5   HGB 13.4 13.5 14.5   HCT 36.5 38.8 39.6   * 102* 104*    194 220     No results for input(s): CULT in the last 168 hours.  Recent Labs   Lab 05/15/22  0639 05/14/22  1655 05/14/22  1648 05/14/22  1222 05/10/22  1400     --   --  138  139  139 136   POTASSIUM 4.1  --  3.9 4.5  4.5  4.5 3.8   CHLORIDE 107  --   --  106  107  107 103   CO2 24  --   --  25  27  27 26   ANIONGAP 5  --   --  7  5  5 7   * 146*  --  118*  120*  120* 85   BUN 10  --   --  11  11  11 7   CR 0.47*  --  0.65 0.64  0.59  0.59 0.63   GFRESTIMATED >90  --  >90 >90  >90  >90 >90   EDIN 8.6  --   --  8.7  8.6  8.6 9.2   MAG 2.2  --   --  2.2  --    PROTTOTAL 6.8  --   --  7.8  7.9  --    ALBUMIN 3.0*  --   --  3.5  3.5  --    BILITOTAL 0.8  --   --  0.5  0.5  --    ALKPHOS  89  --   --  102  104  --    AST 27  --   --  51*  49*  --    ALT 42  --   --  53*  51*  --        Recent Labs   Lab 05/15/22  0639 05/14/22  1655 05/14/22  1222 05/10/22  1400   * 146* 118*  120*  120* 85       Recent Labs   Lab 05/14/22  1218   INR 1.03         No results for input(s): TROPONIN, TROPI, TROPR in the last 168 hours.    Invalid input(s): TROP, TROPONINIES    Recent Results (from the past 48 hour(s))   XR Chest Port 1 View    Narrative    EXAM: XR CHEST PORTABLE 1 VIEW  LOCATION: [facility]  DATE/TIME: 05/14/2022, 1:24 PM    INDICATION: Dyspnea.  COMPARISON: 05/10/2022.      Impression    IMPRESSION: Negative chest.         COVID Status:  COVID-19 PCR Results    COVID-19 PCR Results 1/10/22 2/3/22 2/21/22 4/2/22 5/10/22 5/14/22   SARS CoV2 PCR Negative Negative Negative Negative Positive (A) Positive (A)   (A) Abnormal value       Comments are available for some flowsheets but are not being displayed.         COVID-19 Antibody Results, Testing for Immunity    COVID-19 Antibody Results, Testing for Immunity   No data to display.              Disclaimer: This note consists of symbols derived from keyboarding, dictation and/or voice recognition software. As a result, there may be errors in the script that have gone undetected. Please consider this when interpreting information found in this chart.

## 2022-05-15 NOTE — PLAN OF CARE
Pt arrived to floor at 16:00.  VSS.  AOX4.  Breathing is unlabored at rest on 2 LPM NC with BLUE; pt does not drop O2 sats with activity but HR increases and WOB increases notably.  Pt denies pain.  Cough is congested and pt is having some productive sputum.  Low Na diet and no caffeine.  Independent in room.    K+ 3.9 and Mag 2.2; rechecks in for AM    BG checked on admit and was 146    Pt showered when she arrived to unit    Tele: SR/ST

## 2022-05-15 NOTE — PLAN OF CARE
"Orientation:  A&O x4  VS: BP (!) 148/97 (BP Location: Right arm, Patient Position: Semi-Dorsey's)   Pulse 84   Temp 97.8  F (36.6  C) (Oral)   Resp 16   Ht 1.6 m (5' 3\")   Wt 61.6 kg (135 lb 12.8 oz)   SpO2 100%   BMI 24.06 kg/m    Pain: Denies   Tele:  SR / 2 runs of PVSTs.   Activity: IND  Resp:  Dim with expiratory wheezing. 1-2L NC.  GI:  WDL  : WDL  Notable Labs:  NA  Skin:  WDL. C/o itching related to psoriasis   Lines: L piv SL  Other:  Pt was weaned to 1/2L NC but had increased BLUE. NC was increased to 1.5L.  Plan:  Continue to treat COPD exacerbation. Will continue to monitor.     "

## 2022-05-15 NOTE — PROVIDER NOTIFICATION
MD paged:    FYI, Pt had 2 runs of PSVT. First was 4.5 seconds w/ 7 beats and the second was 5.8 seconds w/ 10 beats. Thanks

## 2022-05-15 NOTE — PLAN OF CARE
A/O. Denies pain. Denies shortness of breath. 1 LPM nasal cannula. BLUE and O2 drops when ambulating to restroom. ECHO done this shift. Up independently in room. Special precautions in place for COVID.

## 2022-05-16 LAB
ANION GAP SERPL CALCULATED.3IONS-SCNC: <1 MMOL/L (ref 3–14)
ATRIAL RATE - MUSE: 139 BPM
BUN SERPL-MCNC: 14 MG/DL (ref 7–30)
CALCIUM SERPL-MCNC: 8.7 MG/DL (ref 8.5–10.1)
CHLORIDE BLD-SCNC: 106 MMOL/L (ref 94–109)
CO2 SERPL-SCNC: 31 MMOL/L (ref 20–32)
CREAT SERPL-MCNC: 0.59 MG/DL (ref 0.52–1.04)
CRP SERPL-MCNC: <2.9 MG/L (ref 0–8)
D DIMER PPP FEU-MCNC: 0.98 UG/ML FEU (ref 0–0.5)
DIASTOLIC BLOOD PRESSURE - MUSE: NORMAL MMHG
ERYTHROCYTE [DISTWIDTH] IN BLOOD BY AUTOMATED COUNT: 12.8 % (ref 10–15)
GFR SERPL CREATININE-BSD FRML MDRD: >90 ML/MIN/1.73M2
GLUCOSE BLD-MCNC: 138 MG/DL (ref 70–99)
HCT VFR BLD AUTO: 37 % (ref 35–47)
HGB BLD-MCNC: 13.2 G/DL (ref 11.7–15.7)
INTERPRETATION ECG - MUSE: NORMAL
MAGNESIUM SERPL-MCNC: 2.3 MG/DL (ref 1.6–2.3)
MCH RBC QN AUTO: 36.6 PG (ref 26.5–33)
MCHC RBC AUTO-ENTMCNC: 35.7 G/DL (ref 31.5–36.5)
MCV RBC AUTO: 103 FL (ref 78–100)
P AXIS - MUSE: NORMAL DEGREES
PLATELET # BLD AUTO: 196 10E3/UL (ref 150–450)
POTASSIUM BLD-SCNC: 4.6 MMOL/L (ref 3.4–5.3)
PR INTERVAL - MUSE: 144 MS
QRS DURATION - MUSE: 70 MS
QT - MUSE: 298 MS
QTC - MUSE: 453 MS
R AXIS - MUSE: 60 DEGREES
RBC # BLD AUTO: 3.61 10E6/UL (ref 3.8–5.2)
SODIUM SERPL-SCNC: 137 MMOL/L (ref 133–144)
SYSTOLIC BLOOD PRESSURE - MUSE: NORMAL MMHG
T AXIS - MUSE: -54 DEGREES
VENTRICULAR RATE- MUSE: 139 BPM
WBC # BLD AUTO: 6.4 10E3/UL (ref 4–11)

## 2022-05-16 PROCEDURE — 86140 C-REACTIVE PROTEIN: CPT | Performed by: INTERNAL MEDICINE

## 2022-05-16 PROCEDURE — 94640 AIRWAY INHALATION TREATMENT: CPT

## 2022-05-16 PROCEDURE — 99232 SBSQ HOSP IP/OBS MODERATE 35: CPT | Performed by: INTERNAL MEDICINE

## 2022-05-16 PROCEDURE — 250N000013 HC RX MED GY IP 250 OP 250 PS 637: Performed by: INTERNAL MEDICINE

## 2022-05-16 PROCEDURE — 94640 AIRWAY INHALATION TREATMENT: CPT | Mod: 76

## 2022-05-16 PROCEDURE — 120N000001 HC R&B MED SURG/OB

## 2022-05-16 PROCEDURE — 85027 COMPLETE CBC AUTOMATED: CPT | Performed by: INTERNAL MEDICINE

## 2022-05-16 PROCEDURE — 82310 ASSAY OF CALCIUM: CPT | Performed by: INTERNAL MEDICINE

## 2022-05-16 PROCEDURE — 999N000157 HC STATISTIC RCP TIME EA 10 MIN

## 2022-05-16 PROCEDURE — 250N000011 HC RX IP 250 OP 636: Performed by: INTERNAL MEDICINE

## 2022-05-16 PROCEDURE — 85379 FIBRIN DEGRADATION QUANT: CPT | Performed by: INTERNAL MEDICINE

## 2022-05-16 PROCEDURE — 36415 COLL VENOUS BLD VENIPUNCTURE: CPT | Performed by: INTERNAL MEDICINE

## 2022-05-16 PROCEDURE — 83735 ASSAY OF MAGNESIUM: CPT | Performed by: INTERNAL MEDICINE

## 2022-05-16 RX ADMIN — METHYLPREDNISOLONE SODIUM SUCCINATE 40 MG: 40 INJECTION, POWDER, FOR SOLUTION INTRAMUSCULAR; INTRAVENOUS at 16:57

## 2022-05-16 RX ADMIN — LEVALBUTEROL TARTRATE 2 PUFF: 45 AEROSOL, METERED ORAL at 01:04

## 2022-05-16 RX ADMIN — FLUTICASONE FUROATE 1 PUFF: 200 POWDER RESPIRATORY (INHALATION) at 08:45

## 2022-05-16 RX ADMIN — ACETAMINOPHEN 650 MG: 325 TABLET, FILM COATED ORAL at 13:26

## 2022-05-16 RX ADMIN — PAROXETINE HYDROCHLORIDE 10 MG: 10 TABLET, FILM COATED ORAL at 08:55

## 2022-05-16 RX ADMIN — METHYLPREDNISOLONE SODIUM SUCCINATE 40 MG: 40 INJECTION, POWDER, FOR SOLUTION INTRAMUSCULAR; INTRAVENOUS at 08:55

## 2022-05-16 RX ADMIN — LOSARTAN POTASSIUM 25 MG: 25 TABLET, FILM COATED ORAL at 08:55

## 2022-05-16 RX ADMIN — ATORVASTATIN CALCIUM 20 MG: 20 TABLET, FILM COATED ORAL at 08:55

## 2022-05-16 RX ADMIN — GUAIFENESIN 600 MG: 600 TABLET, EXTENDED RELEASE ORAL at 20:24

## 2022-05-16 RX ADMIN — LEVOTHYROXINE SODIUM 112 MCG: 0.11 TABLET ORAL at 08:55

## 2022-05-16 RX ADMIN — UMECLIDINIUM 1 PUFF: 62.5 AEROSOL, POWDER ORAL at 08:45

## 2022-05-16 RX ADMIN — LEVALBUTEROL TARTRATE 2 PUFF: 45 AEROSOL, METERED ORAL at 08:45

## 2022-05-16 RX ADMIN — ENOXAPARIN SODIUM 40 MG: 40 INJECTION SUBCUTANEOUS at 16:57

## 2022-05-16 RX ADMIN — METHYLPREDNISOLONE SODIUM SUCCINATE 40 MG: 40 INJECTION, POWDER, FOR SOLUTION INTRAMUSCULAR; INTRAVENOUS at 01:00

## 2022-05-16 RX ADMIN — GUAIFENESIN 600 MG: 600 TABLET, EXTENDED RELEASE ORAL at 08:55

## 2022-05-16 RX ADMIN — LEVALBUTEROL TARTRATE 2 PUFF: 45 AEROSOL, METERED ORAL at 14:09

## 2022-05-16 ASSESSMENT — ACTIVITIES OF DAILY LIVING (ADL)
ADLS_ACUITY_SCORE: 22

## 2022-05-16 NOTE — PLAN OF CARE
"Goal Outcome Evaluation:    Vitals: BP (!) 147/90 (BP Location: Right arm)   Pulse 71   Temp 97.9  F (36.6  C) (Oral)   Resp 20   Ht 1.6 m (5' 3\")   Wt 62.1 kg (136 lb 12.8 oz)   SpO2 99%   BMI 24.23 kg/m      Orientation: A&OX4. Speech clear. Pleasant & cooperative.  Pain: Denies  Respiratory: LS have exp wheezes throughout but diminished in all right lobes. BLUE. SAT level was 98%@1L; but she did not want it to be taken off at night.   Cardiac/Tele: SR  Bowel Sounds: +X4Q. ABD soft, non-tender.   GI/: Continent urine, no bm this shift.   Skin: intact  LDA: L PIV SL  Protocols: K & Mag  Diet: Cardiac diet  Activity: Indep in room. Gait steady.   Followed By: Respiratory.  Plan: Possible discharge home in 1-2 days pending improvement of SOB.                        "

## 2022-05-16 NOTE — PLAN OF CARE
"/86 (BP Location: Right arm)   Pulse 75   Temp 98  F (36.7  C) (Oral)   Resp 18   Ht 1.6 m (5' 3\")   Wt 62.1 kg (136 lb 12.8 oz)   SpO2 97%   BMI 24.23 kg/m      A/O. C/o headache, received tylenol with relief. Denies shortness of breath.Tolerating RA. BLUE and O2 drops when ambulating to restroom. Will continue PoC.  "

## 2022-05-16 NOTE — PROGRESS NOTES
[facility]  Hospitalist Progress Note  Galdino Devine M.D., M.B.A.   05/16/2022    Reason for Stay/active problem list    Acute respiratory failure-hypoxic and hypercapnic likely secondary to acute COPD    COVID-19 infection           Assessment and Plan:        Summary of Stay:   Lara Melendez is a 56 year old  female with a significant past medical history of COPD on as needed oxygen at home, hypertension, hyperlipidemia who presents with worsening shortness of breath.  Patient was seen on May 10 for shortness of breath and was diagnosed with COVID-19 infection and COPD exacerbation.  Patient's symptoms progressively got worse and she came to the emergency department for further evaluation.     On presentation to the emergency department, blood pressure 175/112, heart rate was 148, temp was 98.2, oxygen saturation 97%.  EKG showed sinus tachycardia with premature atrial complexes.  Chest x-ray was clear.  Initial VBG showed pH of 7.31, PCO2 56, PO2 70.  Normal bicarb     Patient was started on BiPAP therapy due to respiratory distress and hospitalist service was consulted for admission and further care.       Problem List with Assessment and Plan:   #1.  Acute respiratory failure-hypoxic and hypercapnic likely secondary to acute COPD exacerbation in the setting of recent COVID-19 infection.  -- Required BiPAP therapy on admission which was weaned off to nasal oxygen  --Treated with nebulized treatments, IV steroids and close monitoring  --Currently much better with improvement of shortness of breath and wheezing.  She still has dyspnea on exertion.  Continue current medications and supplemental oxygen as needed , may wean off     #2.  Acute COPD exacerbation  --  Of BiPAP, continue treatment as above    #3.  COVID-19 infection  -- Patient has positive COVID test from May 10.  Patient does not appear to have pulmonary infiltrates.  She was vaccinated with Moderna but was not  boosted  -- I doubt this is the primary reason for respiratory failure but definitely contributes to COPD exacerbation  -- Continue supplemental oxygen, continue steroid with methylprednisolone, I would not do remdesivir or other COVID-specific medications.  -- D-dimer is elevated but I doubt she has venous thromboembolism.       #4.  Mild LFT mildly, monitor for now     #5.  Hypertension on atenolol and losartan  --  Uncontrolled on admission, improved   -- Continue home medications     #6.  Hyperlipidemia on statin     #7.  Hypothyroidism on levothyroxine     #8.  Anxiety on Paxil     #9.  Tachyarrhythmia: Patient has significant sinus tachycardia on admission and was noted to have episode of paroxysmal SVT.  Denies any chest pain but she has dyspnea on exertion.  -- No indication of acute coronary ischemia.  Echocardiogram this admission showed a normal left ventricular size and systolic function with EF of 65 to 70%.  No significant valvular lesion or wall motion abnormalities.  --Continue on telemetry    Plan for today:    Ewa improving but she is very dyspneic with exertion and would like to stay another day for monitoring.  Plan to wean off oxygen, check oxygen saturation with exertion and evaluate for home oxygen.      VTE Prophylaxis: Enoxaparin (Lovenox) SQ  Code Status: Full Code  Diet: Combination Diet Low Saturated Fat Na <2400mg Diet, No Caffeine Diet    Hines Catheter: Not present    Family updated today: No     Disposition: Discharge home with or without oxygen in the next 24 hours if she remains stable        Interval History (Subjective):        Patient is seen and examined by me today and medical record reviewed.Overnight events noted and care discussed with nursing staff.  She is feeling much better but continues to have shortness of breath especially with activity.  Her oxygen saturation briefly dropped to 85% with minimal movement and improved with 1 L of oxygen.  She denies any chest pain or  "fever.  She is concerned that she is not feeling well to go home today                  Physical Exam:        Last Vital Signs:  /86 (BP Location: Right arm)   Pulse 75   Temp 98  F (36.7  C) (Oral)   Resp 18   Ht 1.6 m (5' 3\")   Wt 62.1 kg (136 lb 12.8 oz)   SpO2 97%   BMI 24.23 kg/m      I/O last 3 completed shifts:  In: 480 [P.O.:480]  Out: -     Wt Readings from Last 5 Encounters:   05/16/22 62.1 kg (136 lb 12.8 oz)   04/02/22 58.9 kg (129 lb 12.8 oz)   02/21/22 59 kg (130 lb)   02/04/22 59 kg (130 lb)   02/04/22 59 kg (130 lb)        Constitutional: Awake, alert, cooperative, no apparent distress     Respiratory:  No increased work of breathing at rest.  She has diffuse wheezing but much better than on admission   Cardiovascular: Regular rate and rhythm, normal S1 and S2, and no murmur noted   Abdomen: Normal bowel sounds, soft, non-distended, non-tender   Skin: No new rashes, no cyanosis, dry to touch   Neuro: Alert with  no new focal weakness   Extremities: No edema   Other(s):        All other systems: Negative          Medications:        All current medications were reviewed with changes reflected in problem list.         Data:      All new lab and imaging data was reviewed.      Data reviewed today: I reviewed all new labs and imaging results over the last 24 hours. I personally reviewed no images or EKG's today      Recent Labs   Lab 05/16/22  0813 05/15/22  0639 05/14/22  1648   WBC 6.4 4.3 8.6   HGB 13.2 13.4 13.5   HCT 37.0 36.5 38.8   * 105* 102*    175 194     No results for input(s): CULT in the last 168 hours.  Recent Labs   Lab 05/16/22  0813 05/15/22  0639 05/14/22  1655 05/14/22  1648 05/14/22  1222    136  --   --  138  139  139   POTASSIUM 4.6 4.1  --  3.9 4.5  4.5  4.5   CHLORIDE 106 107  --   --  106  107  107   CO2 31 24  --   --  25  27  27   ANIONGAP <1* 5  --   --  7  5  5   * 138* 146*  --  118*  120*  120*   BUN 14 10  --   --  11  " 11  11   CR 0.59 0.47*  --  0.65 0.64  0.59  0.59   GFRESTIMATED >90 >90  --  >90 >90  >90  >90   EDIN 8.7 8.6  --   --  8.7  8.6  8.6   MAG 2.3 2.2  --   --  2.2   PROTTOTAL  --  6.8  --   --  7.8  7.9   ALBUMIN  --  3.0*  --   --  3.5  3.5   BILITOTAL  --  0.8  --   --  0.5  0.5   ALKPHOS  --  89  --   --  102  104   AST  --  27  --   --  51*  49*   ALT  --  42  --   --  53*  51*       Recent Labs   Lab 05/16/22  0813 05/15/22  0639 05/14/22  1655 05/14/22  1222 05/10/22  1400   * 138* 146* 118*  120*  120* 85       Recent Labs   Lab 05/14/22  1218   INR 1.03         No results for input(s): TROPONIN, TROPI, TROPR in the last 168 hours.    Invalid input(s): TROP, TROPONINIES    Recent Results (from the past 48 hour(s))   XR Chest Port 1 View    Narrative    EXAM: XR CHEST PORTABLE 1 VIEW  LOCATION: [facility]  DATE/TIME: 05/14/2022, 1:24 PM    INDICATION: Dyspnea.  COMPARISON: 05/10/2022.      Impression    IMPRESSION: Negative chest.         COVID Status:  COVID-19 PCR Results    COVID-19 PCR Results 1/10/22 2/3/22 2/21/22 4/2/22 5/10/22 5/14/22   SARS CoV2 PCR Negative Negative Negative Negative Positive (A) Positive (A)   (A) Abnormal value       Comments are available for some flowsheets but are not being displayed.         COVID-19 Antibody Results, Testing for Immunity    COVID-19 Antibody Results, Testing for Immunity   No data to display.              Disclaimer: This note consists of symbols derived from keyboarding, dictation and/or voice recognition software. As a result, there may be errors in the script that have gone undetected. Please consider this when interpreting information found in this chart.

## 2022-05-16 NOTE — CONSULTS
Care Management Discharge Note    Discharge Date: 05/18/2022       Discharge Disposition: Home    Discharge Services: None    Discharge DME: Oxygen    Discharge Transportation: family or friend will provide    Private pay costs discussed: Not applicable    PAS Confirmation Code:  (N/A)  Patient/family educated on Medicare website which has current facility and service quality ratings:  (N/A)      Patient/Family in Agreement with the Plan: yes    Handoff Referral Completed: No    Additional Information:  Pt admitted with COPD with acute exacerbation and COVID-19, noted to have unplanned readmission risk of 27%.    Pt currently uses home O2 at night and nebulizers.  Pt's O2 is through  [facility].  There are discharge needs identified at this time.  Please update Sw if needs arise.        MARU Villafana, [facility]  Inpatient Care Coordination  [facility]  [phone]

## 2022-05-17 VITALS
BODY MASS INDEX: 23.9 KG/M2 | RESPIRATION RATE: 19 BRPM | HEIGHT: 63 IN | TEMPERATURE: 98 F | OXYGEN SATURATION: 98 % | SYSTOLIC BLOOD PRESSURE: 151 MMHG | HEART RATE: 65 BPM | DIASTOLIC BLOOD PRESSURE: 92 MMHG | WEIGHT: 134.9 LBS

## 2022-05-17 LAB
ANION GAP SERPL CALCULATED.3IONS-SCNC: 4 MMOL/L (ref 3–14)
BUN SERPL-MCNC: 17 MG/DL (ref 7–30)
CALCIUM SERPL-MCNC: 8.8 MG/DL (ref 8.5–10.1)
CHLORIDE BLD-SCNC: 103 MMOL/L (ref 94–109)
CO2 SERPL-SCNC: 29 MMOL/L (ref 20–32)
CREAT SERPL-MCNC: 0.59 MG/DL (ref 0.52–1.04)
CRP SERPL-MCNC: <2.9 MG/L (ref 0–8)
D DIMER PPP FEU-MCNC: 0.88 UG/ML FEU (ref 0–0.5)
ERYTHROCYTE [DISTWIDTH] IN BLOOD BY AUTOMATED COUNT: 12.8 % (ref 10–15)
GFR SERPL CREATININE-BSD FRML MDRD: >90 ML/MIN/1.73M2
GLUCOSE BLD-MCNC: 124 MG/DL (ref 70–99)
HCT VFR BLD AUTO: 40.9 % (ref 35–47)
HGB BLD-MCNC: 13.7 G/DL (ref 11.7–15.7)
MAGNESIUM SERPL-MCNC: 2.4 MG/DL (ref 1.6–2.3)
MCH RBC QN AUTO: 33.2 PG (ref 26.5–33)
MCHC RBC AUTO-ENTMCNC: 33.5 G/DL (ref 31.5–36.5)
MCV RBC AUTO: 99 FL (ref 78–100)
PLATELET # BLD AUTO: 200 10E3/UL (ref 150–450)
POTASSIUM BLD-SCNC: 4.5 MMOL/L (ref 3.4–5.3)
RBC # BLD AUTO: 4.13 10E6/UL (ref 3.8–5.2)
SODIUM SERPL-SCNC: 136 MMOL/L (ref 133–144)
WBC # BLD AUTO: 6.2 10E3/UL (ref 4–11)

## 2022-05-17 PROCEDURE — 85379 FIBRIN DEGRADATION QUANT: CPT | Performed by: INTERNAL MEDICINE

## 2022-05-17 PROCEDURE — 99238 HOSP IP/OBS DSCHRG MGMT 30/<: CPT | Performed by: STUDENT IN AN ORGANIZED HEALTH CARE EDUCATION/TRAINING PROGRAM

## 2022-05-17 PROCEDURE — 36415 COLL VENOUS BLD VENIPUNCTURE: CPT | Performed by: INTERNAL MEDICINE

## 2022-05-17 PROCEDURE — 80048 BASIC METABOLIC PNL TOTAL CA: CPT | Performed by: INTERNAL MEDICINE

## 2022-05-17 PROCEDURE — 999N000157 HC STATISTIC RCP TIME EA 10 MIN

## 2022-05-17 PROCEDURE — 94640 AIRWAY INHALATION TREATMENT: CPT

## 2022-05-17 PROCEDURE — 83735 ASSAY OF MAGNESIUM: CPT | Performed by: INTERNAL MEDICINE

## 2022-05-17 PROCEDURE — 94640 AIRWAY INHALATION TREATMENT: CPT | Mod: 76

## 2022-05-17 PROCEDURE — 250N000011 HC RX IP 250 OP 636: Performed by: INTERNAL MEDICINE

## 2022-05-17 PROCEDURE — 85027 COMPLETE CBC AUTOMATED: CPT | Performed by: INTERNAL MEDICINE

## 2022-05-17 PROCEDURE — 86140 C-REACTIVE PROTEIN: CPT | Performed by: INTERNAL MEDICINE

## 2022-05-17 PROCEDURE — 250N000013 HC RX MED GY IP 250 OP 250 PS 637: Performed by: INTERNAL MEDICINE

## 2022-05-17 RX ORDER — FLUTICASONE PROPIONATE AND SALMETEROL XINAFOATE 230; 21 UG/1; UG/1
2 AEROSOL, METERED RESPIRATORY (INHALATION) 2 TIMES DAILY
Qty: 12 G | Refills: 0 | Status: ON HOLD | OUTPATIENT
Start: 2022-05-17 | End: 2023-12-28

## 2022-05-17 RX ORDER — ALBUTEROL SULFATE 90 UG/1
2 AEROSOL, METERED RESPIRATORY (INHALATION) EVERY 4 HOURS PRN
Qty: 18 G | Refills: 0 | Status: ON HOLD | OUTPATIENT
Start: 2022-05-17 | End: 2024-08-09

## 2022-05-17 RX ORDER — GUAIFENESIN 600 MG/1
600 TABLET, EXTENDED RELEASE ORAL 2 TIMES DAILY
Qty: 30 TABLET | Refills: 0 | Status: SHIPPED | OUTPATIENT
Start: 2022-05-17 | End: 2023-03-26

## 2022-05-17 RX ORDER — PREDNISONE 20 MG/1
40 TABLET ORAL DAILY
Qty: 8 TABLET | Refills: 0 | Status: ON HOLD | OUTPATIENT
Start: 2022-05-17 | End: 2022-08-31

## 2022-05-17 RX ORDER — ALBUTEROL SULFATE 0.83 MG/ML
2.5 SOLUTION RESPIRATORY (INHALATION) EVERY 4 HOURS PRN
Qty: 30 ML | Refills: 0 | Status: ON HOLD | OUTPATIENT
Start: 2022-05-17 | End: 2023-02-13

## 2022-05-17 RX ORDER — BENZONATATE 100 MG/1
100 CAPSULE ORAL 3 TIMES DAILY PRN
Qty: 30 CAPSULE | Refills: 0 | Status: SHIPPED | OUTPATIENT
Start: 2022-05-17 | End: 2022-09-26

## 2022-05-17 RX ADMIN — METHYLPREDNISOLONE SODIUM SUCCINATE 40 MG: 40 INJECTION, POWDER, FOR SOLUTION INTRAMUSCULAR; INTRAVENOUS at 00:30

## 2022-05-17 RX ADMIN — LOSARTAN POTASSIUM 25 MG: 25 TABLET, FILM COATED ORAL at 09:30

## 2022-05-17 RX ADMIN — UMECLIDINIUM 1 PUFF: 62.5 AEROSOL, POWDER ORAL at 08:45

## 2022-05-17 RX ADMIN — ATORVASTATIN CALCIUM 20 MG: 20 TABLET, FILM COATED ORAL at 09:30

## 2022-05-17 RX ADMIN — GUAIFENESIN 600 MG: 600 TABLET, EXTENDED RELEASE ORAL at 09:29

## 2022-05-17 RX ADMIN — LEVALBUTEROL TARTRATE 2 PUFF: 45 AEROSOL, METERED ORAL at 08:45

## 2022-05-17 RX ADMIN — LEVALBUTEROL TARTRATE 2 PUFF: 45 AEROSOL, METERED ORAL at 02:18

## 2022-05-17 RX ADMIN — PAROXETINE HYDROCHLORIDE 10 MG: 10 TABLET, FILM COATED ORAL at 09:29

## 2022-05-17 RX ADMIN — METHYLPREDNISOLONE SODIUM SUCCINATE 40 MG: 40 INJECTION, POWDER, FOR SOLUTION INTRAMUSCULAR; INTRAVENOUS at 09:29

## 2022-05-17 RX ADMIN — FLUTICASONE FUROATE 1 PUFF: 200 POWDER RESPIRATORY (INHALATION) at 08:47

## 2022-05-17 RX ADMIN — LEVOTHYROXINE SODIUM 112 MCG: 0.11 TABLET ORAL at 09:30

## 2022-05-17 ASSESSMENT — ACTIVITIES OF DAILY LIVING (ADL)
ADLS_ACUITY_SCORE: 22

## 2022-05-17 NOTE — PLAN OF CARE
VSS. On 1L of oxygen. A&O. Receiving prednisone and nebs. Productive cough. Potentially going home today. Continue to monitor patient.

## 2022-05-17 NOTE — PLAN OF CARE
Temp: 98  F (36.7  C) Temp src: Oral BP: (!) 155/89 Pulse: 75   Resp: 20 SpO2: 94 % O2 Device: Nasal cannula Oxygen Delivery: 1 LPM     Orientation:  X4 independent in room   Pain: denied    Tele:  SR  Resp:   Wheezing on expiration, some productive cough, no SOB    GI:  WDL , independent   : some urinary incontinence when coughing, gave briefs and fresh chucks per request   Lines: lost upper arm PIV, infiltration and pain. Lower left PIV patent   Other:  K and Mag in range, recheck tomorrow   Plan:   planned discharge for tomorrow to go home

## 2022-05-17 NOTE — DISCHARGE SUMMARY
[facility]  Discharge Summary  Name: Lara Melendez    MRN: [id number]  YOB: 1965    Age: 56 year old  Date of Discharge:  5/17/2022  1:08 PM  Date of Admission: 5/14/2022  Primary Care Provider: Chyna Rust  Discharge Physician:  Mark Olsen DO  Discharging Service:  Hospitalist      Hospital Course  Lara Melendez is a 56 year old  female with a significant past medical history of COPD on as needed oxygen at home, hypertension, hyperlipidemia who presents with worsening shortness of breath.  Patient was seen on May 10 for shortness of breath and was diagnosed with COVID-19 infection and COPD exacerbation.  Patient's symptoms progressively got worse and she came to the emergency department for further evaluation.     On presentation to the emergency department, blood pressure 175/112, heart rate was 148, temp was 98.2, oxygen saturation 97%.  EKG showed sinus tachycardia with premature atrial complexes.  Chest x-ray was clear.  Initial VBG showed pH of 7.31, PCO2 56, PO2 70.  Normal bicarb     Patient was started on BiPAP therapy due to respiratory distress. She improved over time with steroids, nebulizers/inhalders, and oxygen support. On the day of discharge she was saturating >88% with both rest and exertion without supplemental oxygen needs. She was discharged to complete a 5 day course of steroids. Her inhalers were also refilled and she was encouraged to follow-up closely with her PCP and pulmonologist.     Discharge Diagnoses:  Acute hypoxemic and hypercapnic respiratory failure secondary to COPD exacerbation:  Presented in acute respiratory failure requiring BiPAP therapy.  She was quickly weaned off to nasal oxygen.  She received nebulizer, inhaler, steroid, and oxygen supplementation treatments while inpatient.  She improved significantly prior to discharge.  On the day of discharge she was not requiring any supplemental oxygen with rest or exertion.  Of  note, she feels that the left clinic DM inhaler may benefit her the most, however it is unclear if this is truly the case that she is receiving multiple inhalers/nebulizers at the same time.  Will defer further inhaler management to her pulmonologist.    COVID-19 infection:  Patient was found to be COVID-positive on May 10.  She does not have any pulmonary infiltrates on imaging.  She has been vaccinated with Moderna. May or may not be contributing to current COPD exacerbation. Do not believe remdesivir is indicated     Mild LFT elevation:  Monitor. Consider CMP on follow-up.      Hypertension:   PTA Atenolol and losartan    Hyperlipidemia: PTA statin     Hypothyroidism: PTA levothyroxine     Anxiety on Paxil     Tachyarrhythmia: Patient has significant sinus tachycardia on admission and was noted to have episode of paroxysmal SVT.  Denies any chest pain but she has dyspnea on exertion. TTE normal.     Discharge Disposition:  Discharged to home    Allergies:  Allergies   Allergen Reactions     Sulfa Drugs      Penicillins Rash     Generalized rash  Rash, Generalized          Discharge Medications:        Review of your medicines      UNREVIEWED medicines. Ask your doctor about these medicines      Dose / Directions   * albuterol 108 (90 Base) MCG/ACT inhaler  Commonly known as: PROAIR HFA/PROVENTIL HFA/VENTOLIN HFA  Used for: Chronic obstructive pulmonary disease, unspecified COPD type (H)      Dose: 2 puff  Inhale 2 puffs into the lungs every 4 hours as needed for shortness of breath / dyspnea or wheezing Inhale 1-2 Puffs every 4 hours as needed for Wheezing.  Quantity: 18 g  Refills: 0     * albuterol (2.5 MG/3ML) 0.083% neb solution  Commonly known as: PROVENTIL  Used for: COPD exacerbation (H)      Dose: 2.5 mg  Take 1 vial (2.5 mg) by nebulization every 4 hours as needed for shortness of breath / dyspnea or wheezing  Quantity: 30 mL  Refills: 0     atorvastatin 20 MG tablet  Commonly known as: LIPITOR       "Dose: 20 mg  Take 20 mg by mouth daily  Refills: 0     fluticasone-salmeterol 230-21 MCG/ACT inhaler  Commonly known as: ADVAIR-HFA      Dose: 2 puff  Inhale 2 puffs into the lungs 2 times daily  Refills: 0     levothyroxine 112 MCG tablet  Commonly known as: SYNTHROID/LEVOTHROID      Dose: 112 mcg  Take 112 mcg by mouth daily  Refills: 0     losartan 25 MG tablet  Commonly known as: COZAAR  Used for: Essential hypertension      Dose: 25 mg  Take 1 tablet (25 mg) by mouth daily  Quantity: 30 tablet  Refills: 1     PARoxetine 10 MG tablet  Commonly known as: PAXIL      Dose: 1 tablet  Take 1 tablet by mouth daily  Refills: 0     predniSONE 20 MG tablet  Commonly known as: DELTASONE  Ask about: Which instructions should I use?      Take two tablets (= 40mg) each day for 5 (five) days  Quantity: 10 tablet  Refills: 0         * This list has 2 medication(s) that are the same as other medications prescribed for you. Read the directions carefully, and ask your doctor or other care provider to review them with you.                Condition on Discharge:  Discharge condition: Good       Code status on discharge: Full Code     History of Illness:  See detailed admission note for full details.    Physical Exam:  Vital signs:  Temp: 98  F (36.7  C) Temp src: Oral BP: (!) 151/92 Pulse: 65   Resp: 19 SpO2: 98 % O2 Device: None (Room air) Oxygen Delivery: 1 LPM Height: 160 cm (5' 3\") Weight: 61.2 kg (134 lb 14.4 oz)  Estimated body mass index is 23.9 kg/m  as calculated from the following:    Height as of this encounter: 1.6 m (5' 3\").    Weight as of this encounter: 61.2 kg (134 lb 14.4 oz).    Wt Readings from Last 1 Encounters:   05/17/22 61.2 kg (134 lb 14.4 oz)     General: Alert, awake, no acute distress.  HEENT: NC/AT, eyes anicteric, external occular movements intact, face symmetric.  Dentition WNL, MM moist.  Cardiac: RRR, S1, S2.  No murmurs appreciated.  Pulmonary: Normal chest rise, normal work of breathing.  Lungs " CTA BL  Abdomen: soft, non-tender, non-distended.  Bowel Sounds Present.  No guarding.  Extremities: no deformities.  Warm, well perfused.  Skin: no rashes or lesions noted.  Warm and Dry.  Neuro: No focal deficits noted.  Speech clear.  Coordination and strength grossly normal.  Psych: Appropriate affect.    Procedures other than Imaging:  None     Imaging:  No results found for this or any previous visit (from the past 24 hour(s)).     Consultations:  No consultations were requested during this admission.       Recent Lab Results:  Recent Labs   Lab 05/17/22  0815 05/16/22  0813 05/15/22  0639   WBC 6.2 6.4 4.3   HGB 13.7 13.2 13.4   HCT 40.9 37.0 36.5   MCV 99 103* 105*    196 175          Lab Results   Component Value Date     05/17/2022     05/16/2022     05/15/2022     02/02/2006     02/01/2006     01/31/2006    Lab Results   Component Value Date    CHLORIDE 103 05/17/2022    CHLORIDE 106 05/16/2022    CHLORIDE 107 05/15/2022    CHLORIDE 109 02/02/2006    CHLORIDE 107 02/01/2006    CHLORIDE 100 01/31/2006    Lab Results   Component Value Date    BUN 17 05/17/2022    BUN 14 05/16/2022    BUN 10 05/15/2022    BUN <2 02/02/2006    BUN 3 02/01/2006    BUN 4 01/31/2006      Lab Results   Component Value Date    POTASSIUM 4.5 05/17/2022    POTASSIUM 4.6 05/16/2022    POTASSIUM 4.1 05/15/2022    POTASSIUM 3.4 02/02/2006    POTASSIUM 3.5 02/01/2006    POTASSIUM 3.9 01/31/2006    Lab Results   Component Value Date    CO2 29 05/17/2022    CO2 31 05/16/2022    CO2 24 05/15/2022    CO2 17 02/02/2006    CO2 23 02/01/2006    CO2 26 01/31/2006    Lab Results   Component Value Date    CR 0.59 05/17/2022    CR 0.59 05/16/2022    CR 0.47 05/15/2022    CR 0.80 02/02/2006    CR 0.90 02/01/2006    CR 0.90 01/31/2006             Pending Results:    Unresulted Labs Ordered in the Past 30 Days of this Admission     No orders found from 4/14/2022 to 5/15/2022.           Discharge Instructions  and Follow-Up:   No discharge procedures on file.    Total time spent in face to face contact with the patient and coordinating discharge was:  <30 Minutes.

## 2022-05-17 NOTE — PLAN OF CARE
Goal Outcome Evaluation:    VSS. A&O x4. LS diminished 96% RA. O2 walk test completed. Desat to 88% when ambulating rebound to 96% within 25 seconds. Pt denies CP or SOB when ambulating. Provider notified. Independent in room. Productive cough. Tele: SR. Discharge plan to home with inhalers use of home O2 if needed.     Pt verbalizes understanding of discharge instructions and medications. Pt acknowledges receipt of all belongings.

## 2022-05-18 ENCOUNTER — PATIENT OUTREACH (OUTPATIENT)
Dept: CARE COORDINATION | Facility: CLINIC | Age: 57
End: 2022-05-18
Payer: COMMERCIAL

## 2022-05-18 DIAGNOSIS — Z71.89 OTHER SPECIFIED COUNSELING: ICD-10-CM

## 2022-05-18 NOTE — PROGRESS NOTES
Clinic Care Coordination Contact  [facility]/Voicemail     Clinical Data: Care Coordinator Outreach - TCM      Outreach attempted x 1.  Left message on patient's voicemail, providing [facility]'s 24/7 scheduling and nurse triage phone number [phone] ([phone]) for questions/concerns.      Plan:  Care Coordinator will try to reach patient again in 1-2 business days.       Marce Manzanares, EDINSON  [facility], [facility]

## 2022-05-19 NOTE — PROGRESS NOTES
Clinic Care Coordination Contact  [facility]/Voicemail     Clinical Data: Care Coordinator Outreach - TCM      Outreach attempted x 2.  Left message on patient's voicemail, providing [facility]'s 24/7 scheduling and nurse triage phone number [phone] ([phone]) for questions/concerns.      Plan:  Care Coordinator will do no further outreaches at this time.       Marce Manzanares, RN  [facility], [facility]

## 2022-08-29 ENCOUNTER — APPOINTMENT (OUTPATIENT)
Dept: CT IMAGING | Facility: CLINIC | Age: 57
End: 2022-08-29
Attending: EMERGENCY MEDICINE
Payer: COMMERCIAL

## 2022-08-29 ENCOUNTER — HOSPITAL ENCOUNTER (INPATIENT)
Facility: CLINIC | Age: 57
LOS: 2 days | Discharge: HOME | End: 2022-08-31
Attending: EMERGENCY MEDICINE | Admitting: INTERNAL MEDICINE
Payer: COMMERCIAL

## 2022-08-29 DIAGNOSIS — F41.9 ANXIETY: ICD-10-CM

## 2022-08-29 DIAGNOSIS — J96.21 ACUTE AND CHRONIC RESPIRATORY FAILURE WITH HYPOXIA (H): ICD-10-CM

## 2022-08-29 DIAGNOSIS — J44.1 COPD EXACERBATION (H): ICD-10-CM

## 2022-08-29 DIAGNOSIS — J96.01 ACUTE RESPIRATORY FAILURE WITH HYPOXIA (H): ICD-10-CM

## 2022-08-29 DIAGNOSIS — J44.1 COPD WITH ACUTE EXACERBATION (H): Primary | ICD-10-CM

## 2022-08-29 LAB
ALBUMIN SERPL BCG-MCNC: 4.5 G/DL (ref 3.5–5.2)
ALP SERPL-CCNC: 79 U/L (ref 35–104)
ALT SERPL W P-5'-P-CCNC: 55 U/L (ref 10–35)
ANION GAP SERPL CALCULATED.3IONS-SCNC: 13 MMOL/L (ref 7–15)
AST SERPL W P-5'-P-CCNC: 61 U/L (ref 10–35)
ATRIAL RATE - MUSE: 110 BPM
BASE EXCESS BLDV CALC-SCNC: 1.2 MMOL/L (ref -7.7–1.9)
BASOPHILS # BLD AUTO: 0 10E3/UL (ref 0–0.2)
BASOPHILS NFR BLD AUTO: 0 %
BILIRUB SERPL-MCNC: 0.4 MG/DL
BUN SERPL-MCNC: 8.1 MG/DL (ref 6–20)
CALCIUM SERPL-MCNC: 9.3 MG/DL (ref 8.6–10)
CHLORIDE SERPL-SCNC: 102 MMOL/L (ref 98–107)
CREAT SERPL-MCNC: 0.55 MG/DL (ref 0.51–0.95)
DEPRECATED HCO3 PLAS-SCNC: 23 MMOL/L (ref 22–29)
DIASTOLIC BLOOD PRESSURE - MUSE: NORMAL MMHG
EOSINOPHIL # BLD AUTO: 0.2 10E3/UL (ref 0–0.7)
EOSINOPHIL NFR BLD AUTO: 3 %
ERYTHROCYTE [DISTWIDTH] IN BLOOD BY AUTOMATED COUNT: 12.4 % (ref 10–15)
FLUAV RNA SPEC QL NAA+PROBE: NEGATIVE
FLUBV RNA RESP QL NAA+PROBE: NEGATIVE
GFR SERPL CREATININE-BSD FRML MDRD: >90 ML/MIN/1.73M2
GLUCOSE SERPL-MCNC: 102 MG/DL (ref 70–99)
HCO3 BLDV-SCNC: 25 MMOL/L (ref 21–28)
HCO3 BLDV-SCNC: 26 MMOL/L (ref 21–28)
HCT VFR BLD AUTO: 44.1 % (ref 35–47)
HGB BLD-MCNC: 14.8 G/DL (ref 11.7–15.7)
IMM GRANULOCYTES # BLD: 0 10E3/UL
IMM GRANULOCYTES NFR BLD: 0 %
INTERPRETATION ECG - MUSE: NORMAL
LACTATE BLD-SCNC: 0.7 MMOL/L
LYMPHOCYTES # BLD AUTO: 3 10E3/UL (ref 0.8–5.3)
LYMPHOCYTES NFR BLD AUTO: 40 %
MCH RBC QN AUTO: 34.2 PG (ref 26.5–33)
MCHC RBC AUTO-ENTMCNC: 33.6 G/DL (ref 31.5–36.5)
MCV RBC AUTO: 102 FL (ref 78–100)
MONOCYTES # BLD AUTO: 0.6 10E3/UL (ref 0–1.3)
MONOCYTES NFR BLD AUTO: 9 %
NEUTROPHILS # BLD AUTO: 3.6 10E3/UL (ref 1.6–8.3)
NEUTROPHILS NFR BLD AUTO: 48 %
NRBC # BLD AUTO: 0 10E3/UL
NRBC BLD AUTO-RTO: 0 /100
NT-PROBNP SERPL-MCNC: 195 PG/ML (ref 0–900)
O2/TOTAL GAS SETTING VFR VENT: 6 %
OXYHGB MFR BLDV: 88 % (ref 70–75)
P AXIS - MUSE: -23 DEGREES
PCO2 BLDV: 40 MM HG (ref 40–50)
PCO2 BLDV: 41 MM HG (ref 40–50)
PH BLDV: 7.4 [PH] (ref 7.32–7.43)
PH BLDV: 7.41 [PH] (ref 7.32–7.43)
PLATELET # BLD AUTO: 171 10E3/UL (ref 150–450)
PO2 BLDV: 57 MM HG (ref 25–47)
PO2 BLDV: 58 MM HG (ref 25–47)
POTASSIUM SERPL-SCNC: 4.3 MMOL/L (ref 3.4–5.3)
PR INTERVAL - MUSE: 142 MS
PROT SERPL-MCNC: 7.8 G/DL (ref 6.4–8.3)
QRS DURATION - MUSE: 76 MS
QT - MUSE: 324 MS
QTC - MUSE: 438 MS
R AXIS - MUSE: 67 DEGREES
RBC # BLD AUTO: 4.33 10E6/UL (ref 3.8–5.2)
RSV RNA SPEC NAA+PROBE: NEGATIVE
SAO2 % BLDV: 90 % (ref 94–100)
SARS-COV-2 RNA RESP QL NAA+PROBE: NEGATIVE
SODIUM SERPL-SCNC: 138 MMOL/L (ref 136–145)
SYSTOLIC BLOOD PRESSURE - MUSE: NORMAL MMHG
T AXIS - MUSE: 67 DEGREES
TROPONIN T SERPL HS-MCNC: 8 NG/L
VENTRICULAR RATE- MUSE: 110 BPM
WBC # BLD AUTO: 7.5 10E3/UL (ref 4–11)

## 2022-08-29 PROCEDURE — 87637 SARSCOV2&INF A&B&RSV AMP PRB: CPT | Performed by: EMERGENCY MEDICINE

## 2022-08-29 PROCEDURE — 85025 COMPLETE CBC W/AUTO DIFF WBC: CPT | Performed by: EMERGENCY MEDICINE

## 2022-08-29 PROCEDURE — 250N000011 HC RX IP 250 OP 636: Performed by: EMERGENCY MEDICINE

## 2022-08-29 PROCEDURE — 99285 EMERGENCY DEPT VISIT HI MDM: CPT | Mod: 25

## 2022-08-29 PROCEDURE — 96375 TX/PRO/DX INJ NEW DRUG ADDON: CPT

## 2022-08-29 PROCEDURE — 120N000001 HC R&B MED SURG/OB

## 2022-08-29 PROCEDURE — 80053 COMPREHEN METABOLIC PANEL: CPT | Performed by: EMERGENCY MEDICINE

## 2022-08-29 PROCEDURE — 96365 THER/PROPH/DIAG IV INF INIT: CPT | Mod: 59

## 2022-08-29 PROCEDURE — 250N000009 HC RX 250: Performed by: PHYSICIAN ASSISTANT

## 2022-08-29 PROCEDURE — 999N000105 HC STATISTIC NO DOCUMENTATION TO SUPPORT CHARGE

## 2022-08-29 PROCEDURE — 94640 AIRWAY INHALATION TREATMENT: CPT

## 2022-08-29 PROCEDURE — 94640 AIRWAY INHALATION TREATMENT: CPT | Mod: 76

## 2022-08-29 PROCEDURE — 250N000009 HC RX 250: Performed by: EMERGENCY MEDICINE

## 2022-08-29 PROCEDURE — 258N000003 HC RX IP 258 OP 636: Performed by: EMERGENCY MEDICINE

## 2022-08-29 PROCEDURE — 99207 PR APP CREDIT; MD BILLING SHARED VISIT: CPT | Performed by: PHYSICIAN ASSISTANT

## 2022-08-29 PROCEDURE — C9803 HOPD COVID-19 SPEC COLLECT: HCPCS

## 2022-08-29 PROCEDURE — 250N000009 HC RX 250: Performed by: INTERNAL MEDICINE

## 2022-08-29 PROCEDURE — 84484 ASSAY OF TROPONIN QUANT: CPT | Performed by: EMERGENCY MEDICINE

## 2022-08-29 PROCEDURE — 96361 HYDRATE IV INFUSION ADD-ON: CPT

## 2022-08-29 PROCEDURE — 82805 BLOOD GASES W/O2 SATURATION: CPT | Performed by: EMERGENCY MEDICINE

## 2022-08-29 PROCEDURE — 83605 ASSAY OF LACTIC ACID: CPT

## 2022-08-29 PROCEDURE — 93005 ELECTROCARDIOGRAM TRACING: CPT

## 2022-08-29 PROCEDURE — 82040 ASSAY OF SERUM ALBUMIN: CPT | Performed by: EMERGENCY MEDICINE

## 2022-08-29 PROCEDURE — 999N000157 HC STATISTIC RCP TIME EA 10 MIN

## 2022-08-29 PROCEDURE — 250N000013 HC RX MED GY IP 250 OP 250 PS 637: Performed by: PHYSICIAN ASSISTANT

## 2022-08-29 PROCEDURE — 99223 1ST HOSP IP/OBS HIGH 75: CPT | Mod: AI | Performed by: INTERNAL MEDICINE

## 2022-08-29 PROCEDURE — 71275 CT ANGIOGRAPHY CHEST: CPT

## 2022-08-29 PROCEDURE — 36415 COLL VENOUS BLD VENIPUNCTURE: CPT | Performed by: EMERGENCY MEDICINE

## 2022-08-29 PROCEDURE — 82803 BLOOD GASES ANY COMBINATION: CPT

## 2022-08-29 PROCEDURE — 250N000011 HC RX IP 250 OP 636: Performed by: PHYSICIAN ASSISTANT

## 2022-08-29 PROCEDURE — 83880 ASSAY OF NATRIURETIC PEPTIDE: CPT | Performed by: EMERGENCY MEDICINE

## 2022-08-29 RX ORDER — LEVOTHYROXINE SODIUM 112 UG/1
112 TABLET ORAL DAILY
Status: DISCONTINUED | OUTPATIENT
Start: 2022-08-29 | End: 2022-08-31 | Stop reason: HOSPADM

## 2022-08-29 RX ORDER — GUAIFENESIN 600 MG/1
600 TABLET, EXTENDED RELEASE ORAL 2 TIMES DAILY
Status: DISCONTINUED | OUTPATIENT
Start: 2022-08-29 | End: 2022-08-31 | Stop reason: HOSPADM

## 2022-08-29 RX ORDER — METHYLPREDNISOLONE SODIUM SUCCINATE 125 MG/2ML
60 INJECTION, POWDER, LYOPHILIZED, FOR SOLUTION INTRAMUSCULAR; INTRAVENOUS DAILY
Status: DISCONTINUED | OUTPATIENT
Start: 2022-08-29 | End: 2022-08-31

## 2022-08-29 RX ORDER — AZITHROMYCIN 250 MG/1
500 TABLET, FILM COATED ORAL ONCE
Status: DISCONTINUED | OUTPATIENT
Start: 2022-08-29 | End: 2022-08-31 | Stop reason: HOSPADM

## 2022-08-29 RX ORDER — ACETAMINOPHEN 325 MG/1
650 TABLET ORAL EVERY 6 HOURS PRN
Status: DISCONTINUED | OUTPATIENT
Start: 2022-08-29 | End: 2022-08-31 | Stop reason: HOSPADM

## 2022-08-29 RX ORDER — ALPRAZOLAM 0.25 MG
0.25 TABLET ORAL DAILY
Status: ON HOLD | COMMUNITY
End: 2022-12-02

## 2022-08-29 RX ORDER — AMOXICILLIN 250 MG
2 CAPSULE ORAL 2 TIMES DAILY PRN
Status: DISCONTINUED | OUTPATIENT
Start: 2022-08-29 | End: 2022-08-31 | Stop reason: HOSPADM

## 2022-08-29 RX ORDER — POLYETHYLENE GLYCOL 3350 17 G/17G
17 POWDER, FOR SOLUTION ORAL DAILY PRN
Status: DISCONTINUED | OUTPATIENT
Start: 2022-08-29 | End: 2022-08-31 | Stop reason: HOSPADM

## 2022-08-29 RX ORDER — ALBUTEROL SULFATE 0.83 MG/ML
2.5 SOLUTION RESPIRATORY (INHALATION)
Status: DISCONTINUED | OUTPATIENT
Start: 2022-08-29 | End: 2022-08-31 | Stop reason: HOSPADM

## 2022-08-29 RX ORDER — DEXAMETHASONE SODIUM PHOSPHATE 10 MG/ML
10 INJECTION, SOLUTION INTRAMUSCULAR; INTRAVENOUS ONCE
Status: COMPLETED | OUTPATIENT
Start: 2022-08-29 | End: 2022-08-29

## 2022-08-29 RX ORDER — LIDOCAINE 40 MG/G
CREAM TOPICAL
Status: DISCONTINUED | OUTPATIENT
Start: 2022-08-29 | End: 2022-08-31 | Stop reason: HOSPADM

## 2022-08-29 RX ORDER — ENOXAPARIN SODIUM 100 MG/ML
30 INJECTION SUBCUTANEOUS EVERY 24 HOURS
Status: DISCONTINUED | OUTPATIENT
Start: 2022-08-29 | End: 2022-08-31 | Stop reason: HOSPADM

## 2022-08-29 RX ORDER — IPRATROPIUM BROMIDE AND ALBUTEROL SULFATE 2.5; .5 MG/3ML; MG/3ML
3 SOLUTION RESPIRATORY (INHALATION)
Status: DISCONTINUED | OUTPATIENT
Start: 2022-08-29 | End: 2022-08-29

## 2022-08-29 RX ORDER — LOSARTAN POTASSIUM 25 MG/1
25 TABLET ORAL DAILY
Status: DISCONTINUED | OUTPATIENT
Start: 2022-08-29 | End: 2022-08-31 | Stop reason: HOSPADM

## 2022-08-29 RX ORDER — HYDROXYZINE HYDROCHLORIDE 25 MG/1
25 TABLET, FILM COATED ORAL 3 TIMES DAILY PRN
Status: DISCONTINUED | OUTPATIENT
Start: 2022-08-29 | End: 2022-08-31 | Stop reason: HOSPADM

## 2022-08-29 RX ORDER — LORAZEPAM 0.5 MG/1
.5-1 TABLET ORAL 3 TIMES DAILY PRN
Status: DISCONTINUED | OUTPATIENT
Start: 2022-08-29 | End: 2022-08-31 | Stop reason: HOSPADM

## 2022-08-29 RX ORDER — ONDANSETRON 2 MG/ML
4 INJECTION INTRAMUSCULAR; INTRAVENOUS EVERY 6 HOURS PRN
Status: DISCONTINUED | OUTPATIENT
Start: 2022-08-29 | End: 2022-08-31 | Stop reason: HOSPADM

## 2022-08-29 RX ORDER — ACETAMINOPHEN 650 MG/1
650 SUPPOSITORY RECTAL EVERY 6 HOURS PRN
Status: DISCONTINUED | OUTPATIENT
Start: 2022-08-29 | End: 2022-08-31 | Stop reason: HOSPADM

## 2022-08-29 RX ORDER — LORAZEPAM 2 MG/ML
1 INJECTION INTRAMUSCULAR ONCE
Status: COMPLETED | OUTPATIENT
Start: 2022-08-29 | End: 2022-08-29

## 2022-08-29 RX ORDER — IPRATROPIUM BROMIDE AND ALBUTEROL SULFATE 2.5; .5 MG/3ML; MG/3ML
6 SOLUTION RESPIRATORY (INHALATION) ONCE
Status: COMPLETED | OUTPATIENT
Start: 2022-08-29 | End: 2022-08-29

## 2022-08-29 RX ORDER — IPRATROPIUM BROMIDE AND ALBUTEROL SULFATE 2.5; .5 MG/3ML; MG/3ML
3 SOLUTION RESPIRATORY (INHALATION)
Status: DISCONTINUED | OUTPATIENT
Start: 2022-08-29 | End: 2022-08-31 | Stop reason: HOSPADM

## 2022-08-29 RX ORDER — IOPAMIDOL 755 MG/ML
500 INJECTION, SOLUTION INTRAVASCULAR ONCE
Status: COMPLETED | OUTPATIENT
Start: 2022-08-29 | End: 2022-08-29

## 2022-08-29 RX ORDER — SODIUM CHLORIDE 9 MG/ML
INJECTION, SOLUTION INTRAVENOUS CONTINUOUS
Status: DISCONTINUED | OUTPATIENT
Start: 2022-08-29 | End: 2022-08-29

## 2022-08-29 RX ORDER — AZITHROMYCIN 250 MG/1
250 TABLET, FILM COATED ORAL DAILY
Status: DISCONTINUED | OUTPATIENT
Start: 2022-08-30 | End: 2022-08-31 | Stop reason: HOSPADM

## 2022-08-29 RX ORDER — ONDANSETRON 4 MG/1
4 TABLET, ORALLY DISINTEGRATING ORAL EVERY 6 HOURS PRN
Status: DISCONTINUED | OUTPATIENT
Start: 2022-08-29 | End: 2022-08-31 | Stop reason: HOSPADM

## 2022-08-29 RX ORDER — PAROXETINE 10 MG/1
10 TABLET, FILM COATED ORAL DAILY
Status: DISCONTINUED | OUTPATIENT
Start: 2022-08-29 | End: 2022-08-31 | Stop reason: HOSPADM

## 2022-08-29 RX ORDER — IBUPROFEN 600 MG/1
600 TABLET, FILM COATED ORAL EVERY 6 HOURS PRN
Status: DISCONTINUED | OUTPATIENT
Start: 2022-08-29 | End: 2022-08-31 | Stop reason: HOSPADM

## 2022-08-29 RX ORDER — AZITHROMYCIN 500 MG/5ML
500 INJECTION, POWDER, LYOPHILIZED, FOR SOLUTION INTRAVENOUS ONCE
Status: COMPLETED | OUTPATIENT
Start: 2022-08-29 | End: 2022-08-29

## 2022-08-29 RX ORDER — HYDROXYZINE HYDROCHLORIDE 50 MG/1
50 TABLET, FILM COATED ORAL EVERY 6 HOURS PRN
Status: DISCONTINUED | OUTPATIENT
Start: 2022-08-29 | End: 2022-08-31 | Stop reason: HOSPADM

## 2022-08-29 RX ORDER — ATORVASTATIN CALCIUM 20 MG/1
20 TABLET, FILM COATED ORAL EVERY EVENING
Status: DISCONTINUED | OUTPATIENT
Start: 2022-08-29 | End: 2022-08-31 | Stop reason: HOSPADM

## 2022-08-29 RX ORDER — AMOXICILLIN 250 MG
1 CAPSULE ORAL 2 TIMES DAILY PRN
Status: DISCONTINUED | OUTPATIENT
Start: 2022-08-29 | End: 2022-08-31 | Stop reason: HOSPADM

## 2022-08-29 RX ADMIN — ACETAMINOPHEN 650 MG: 325 TABLET, FILM COATED ORAL at 13:07

## 2022-08-29 RX ADMIN — AZITHROMYCIN MONOHYDRATE 500 MG: 500 INJECTION, POWDER, LYOPHILIZED, FOR SOLUTION INTRAVENOUS at 06:53

## 2022-08-29 RX ADMIN — SODIUM CHLORIDE 1000 ML: 9 INJECTION, SOLUTION INTRAVENOUS at 06:48

## 2022-08-29 RX ADMIN — HYDROXYZINE HYDROCHLORIDE 25 MG: 25 TABLET ORAL at 15:03

## 2022-08-29 RX ADMIN — METHYLPREDNISOLONE SODIUM SUCCINATE 62.5 MG: 125 INJECTION, POWDER, FOR SOLUTION INTRAMUSCULAR; INTRAVENOUS at 15:01

## 2022-08-29 RX ADMIN — LEVOTHYROXINE SODIUM 112 MCG: 0.11 TABLET ORAL at 15:01

## 2022-08-29 RX ADMIN — ATORVASTATIN CALCIUM 20 MG: 20 TABLET, FILM COATED ORAL at 20:19

## 2022-08-29 RX ADMIN — LOSARTAN POTASSIUM 25 MG: 25 TABLET, FILM COATED ORAL at 15:01

## 2022-08-29 RX ADMIN — SODIUM CHLORIDE 74 ML: 9 INJECTION, SOLUTION INTRAVENOUS at 09:03

## 2022-08-29 RX ADMIN — IPRATROPIUM BROMIDE AND ALBUTEROL SULFATE 6 ML: .5; 3 SOLUTION RESPIRATORY (INHALATION) at 07:13

## 2022-08-29 RX ADMIN — DEXAMETHASONE SODIUM PHOSPHATE 10 MG: 10 INJECTION, SOLUTION INTRAMUSCULAR; INTRAVENOUS at 06:51

## 2022-08-29 RX ADMIN — IPRATROPIUM BROMIDE AND ALBUTEROL SULFATE 3 ML: .5; 3 SOLUTION RESPIRATORY (INHALATION) at 20:34

## 2022-08-29 RX ADMIN — LORAZEPAM 1 MG: 2 INJECTION INTRAMUSCULAR; INTRAVENOUS at 06:48

## 2022-08-29 RX ADMIN — IOPAMIDOL 52 ML: 755 INJECTION, SOLUTION INTRAVENOUS at 09:03

## 2022-08-29 RX ADMIN — ENOXAPARIN SODIUM 30 MG: 30 INJECTION SUBCUTANEOUS at 15:01

## 2022-08-29 RX ADMIN — PAROXETINE HYDROCHLORIDE 10 MG: 10 TABLET, FILM COATED ORAL at 18:05

## 2022-08-29 RX ADMIN — GUAIFENESIN 600 MG: 600 TABLET, EXTENDED RELEASE ORAL at 20:19

## 2022-08-29 RX ADMIN — IPRATROPIUM BROMIDE AND ALBUTEROL SULFATE 3 ML: .5; 3 SOLUTION RESPIRATORY (INHALATION) at 15:11

## 2022-08-29 ASSESSMENT — ACTIVITIES OF DAILY LIVING (ADL)
TOILETING_ISSUES: NO
WALKING_OR_CLIMBING_STAIRS_DIFFICULTY: NO
ADLS_ACUITY_SCORE: 35
CONCENTRATING,_REMEMBERING_OR_MAKING_DECISIONS_DIFFICULTY: NO
DRESSING/BATHING_DIFFICULTY: NO
ADLS_ACUITY_SCORE: 35
DIFFICULTY_EATING/SWALLOWING: NO
ADLS_ACUITY_SCORE: 20
CHANGE_IN_FUNCTIONAL_STATUS_SINCE_ONSET_OF_CURRENT_ILLNESS/INJURY: NO
ADLS_ACUITY_SCORE: 35
ADLS_ACUITY_SCORE: 35
WEAR_GLASSES_OR_BLIND: YES
ADLS_ACUITY_SCORE: 20
DOING_ERRANDS_INDEPENDENTLY_DIFFICULTY: NO
ADLS_ACUITY_SCORE: 20
HEARING_DIFFICULTY_OR_DEAF: NO
FALL_HISTORY_WITHIN_LAST_SIX_MONTHS: NO
ADLS_ACUITY_SCORE: 20
ADLS_ACUITY_SCORE: 20
DIFFICULTY_COMMUNICATING: NO

## 2022-08-29 ASSESSMENT — ENCOUNTER SYMPTOMS
COUGH: 0
SHORTNESS OF BREATH: 1
NERVOUS/ANXIOUS: 1
DIARRHEA: 1
CHILLS: 0
VOMITING: 0
FEVER: 0
CHEST TIGHTNESS: 1

## 2022-08-29 NOTE — PLAN OF CARE
Goal Outcome Evaluation:        End of Shift Summary  For vital signs and complete assessments, please see documentation flowsheets.     Pertinent assessments: Alert and oriented x 4. Up independent in room. O2 stable on 2-3L NC. Pt reports extreme anxiety, worsening throughout the last year. Prn atarax given x 1.      Major Shift Events: Arrived to unit at 1410. Dyspneic with exertion. Slight expiratory wheeze noted.     Treatment Plan: Monitor O2, IV  steroids    Bedside Nurse: Anika Morley RN

## 2022-08-29 NOTE — ED PROVIDER NOTES
History   Chief Complaint:  Shortness of Breath    The history is provided by the patient.      Lara Melendez is a 56 year old female with a history of COPD who presents with EMS alone for shortness of breath. Patient reports to have had COVID back in May 2022.. Since then, she had had extreme anxiety with shortness of breath. On 08/24, she developed chest tightness which prompted her to go see her PCP. They recommended Prozac for her anxiety and Prednisone, a steroid inhaler, nebulizer, and oxygen for 2L. Her shortness of breath has since worsened prompting her to call EMS today. She has had 3 nebulizers at-home. She endorses having had diarrhea as well. Nonetheless, patient denies having any fevers, chills, coughs, or vomiting. Patient is a former smoker of 25 years.     Review of Systems   Constitutional: Negative for chills and fever.   Respiratory: Positive for chest tightness and shortness of breath. Negative for cough.    Gastrointestinal: Positive for diarrhea. Negative for vomiting.   Psychiatric/Behavioral: The patient is nervous/anxious.    All other systems reviewed and are negative.    Allergies:  Sulfa Drugs  Penicillins    Medications:  albuterol   atorvastatin  benzonatate  fluticasone-salmeterol   guaiFENesin   levothyroxine   losartan   PARoxetine   predniSONE     Past Medical History:     Abscess of left jaw  Hypothyroidism  Psoriasis  Tobacco use disorder  Essential hypertension  Anxiety  Mixed hyperlipidemia  Status post incision and drainage  Mandibular abscess  Chronic obstructive pulmonary disease, unspecified COPD type   Dyspnea, unspecified type  Pulmonary nodule  COPD exacerbation   Acute respiratory failure with hypoxia   COPD with acute exacerbation   Acute respiratory failure with hypercapnia   Infection due to 2019 novel coronavirus    Past Surgical History:    Cholecystectomy  Incision and drainage mandible, combined (x2)  Tooth extraction     Family History:    Father: Stomach  "Cancer  Mother: DVT/PE, hypertension, Thyroid Disorder    Social History:  The patient presents to the ED with EMS alone.  Patient is a former smoker of 25 years.    Physical Exam     Patient Vitals for the past 24 hrs:   BP Temp Temp src Pulse Resp SpO2 Height Weight   08/29/22 1210 -- -- -- -- -- 94 % -- --   08/29/22 1140 -- -- -- -- -- 93 % -- --   08/29/22 1030 133/82 -- -- 105 -- -- -- --   08/29/22 1020 -- -- -- -- -- 96 % -- --   08/29/22 1000 125/69 -- -- 98 -- 95 % -- --   08/29/22 0845 137/75 -- -- 101 14 96 % -- --   08/29/22 0830 116/70 -- -- 120 18 96 % -- --   08/29/22 0815 134/85 -- -- 110 22 98 % -- --   08/29/22 0800 135/84 -- -- 110 19 99 % -- --   08/29/22 0730 (!) 146/99 -- -- 111 17 97 % -- --   08/29/22 0715 131/88 -- -- 102 15 98 % -- --   08/29/22 0710 -- -- -- 102 14 97 % -- --   08/29/22 0705 -- -- -- 106 16 96 % -- --   08/29/22 0700 (!) 140/98 -- -- 105 16 95 % -- --   08/29/22 0645 (!) 144/94 -- -- -- -- -- -- --   08/29/22 0640 -- -- -- 108 19 95 % -- --   08/29/22 0635 -- -- -- 115 17 93 % -- --   08/29/22 0630 (!) 179/112 98  F (36.7  C) Oral -- -- -- 1.626 m (5' 4\") 59 kg (130 lb)   08/29/22 0626 -- -- -- (!) 125 -- (!) 89 % -- --     Physical Exam  Constitutional: Patient on CPAP per EMS, switched to nasal canula, speaking in multi words and sentences, anxious, shortness of breath   Head: Atraumatic.  Eyes: Conjunctivae are normal. No scleral icterus.  Neck: Normal range of motion. Neck supple.   Cardiovascular: Tachycardic rate and intact distal perfusion.   Pulmonary/Chest: Coarse bronchial breath sounds equal. No respiratory distress.  Abdominal: Soft.  No distension. No tenderness. No rebound or guarding.   Musculoskeletal: Normal range of motion. No edema or tenderness.   Neurological: Alert and orientated to person, place, and time. No observable focal neuro deficit  Skin: Warm and dry. No rash noted. Not diaphoretic.     Emergency Department Course     ECG:  ECG taken at " 0637, ECG read at 0640  Sinus tachycardia   Otherwise normal ECG   No prior EKG to compare to.  Rate 110 bpm. IA interval 142 ms. QRS duration 76 ms. QT/QTc 324/438 ms. P-R-T axes -23 67 67.     Imaging:  CT Chest Pulmonary Embolism w Contrast   Preliminary Result   IMPRESSION:   1.  No evidence for pulmonary embolism.   2.  Bilateral regions of peribronchial wall thickening and mucus   impaction may reflect an infectious or inflammatory bronchitis.   3.  Stable pulmonary nodule at the right upper lobe, see below for   follow-up.      Recommendations for one or multiple incidental lung nodules < 6mm:     Low risk patients: No routine follow-up.     High risk patients: Optional follow-up CT at 12 months; if   unchanged, no further follow-up.      *Low Risk: Minimal or absent history of smoking or other known risk   factors.   *Nonsolid (ground glass) or partly solid nodules may require longer   follow-up to exclude indolent adenocarcinoma.   *Recommendations based on Guidelines for the Management of Incidental   Pulmonary Nodules Detected at CT: From the Fleischner Society 2017,   Radiology 2017.        Report per radiology    Laboratory:  Labs Ordered and Resulted from Time of ED Arrival to Time of ED Departure   COMPREHENSIVE METABOLIC PANEL - Abnormal       Result Value    Sodium 138      Potassium 4.3      Creatinine 0.55      Urea Nitrogen 8.1      Chloride 102      Carbon Dioxide (CO2) 23      Anion Gap 13      Glucose 102 (*)     Calcium 9.3      Protein Total 7.8      Albumin 4.5      Bilirubin Total 0.4      Alkaline Phosphatase 79      AST 61 (*)     ALT 55 (*)     GFR Estimate >90     BLOOD GAS VENOUS WITH OXYHEMOGLOBIN - Abnormal    pH Venous 7.41      pCO2 Venous 41      pO2 Venous 57 (*)     Bicarbonate Venous 26      FIO2 6      Oxyhemoglobin Venous 88 (*)     Base Excess/Deficit (+/-) 1.2     CBC WITH PLATELETS AND DIFFERENTIAL - Abnormal    WBC Count 7.5      RBC Count 4.33      Hemoglobin 14.8       Hematocrit 44.1       (*)     MCH 34.2 (*)     MCHC 33.6      RDW 12.4      Platelet Count 171      % Neutrophils 48      % Lymphocytes 40      % Monocytes 9      % Eosinophils 3      % Basophils 0      % Immature Granulocytes 0      NRBCs per 100 WBC 0      Absolute Neutrophils 3.6      Absolute Lymphocytes 3.0      Absolute Monocytes 0.6      Absolute Eosinophils 0.2      Absolute Basophils 0.0      Absolute Immature Granulocytes 0.0      Absolute NRBCs 0.0     ISTAT GASES LACTATE VENOUS POCT - Abnormal    Lactic Acid POCT 0.7      Bicarbonate Venous POCT 25      O2 Sat, Venous POCT 90 (*)     pCO2V Venous POCT 40      pH Venous POCT 7.40      pO2 Venous POCT 58 (*)    TROPONIN T, HIGH SENSITIVITY - Normal    Troponin T, High Sensitivity 8     NT PROBNP INPATIENT - Normal    N terminal Pro BNP Inpatient 195     INFLUENZA A/B & SARS-COV2 PCR MULTIPLEX - Normal    Influenza A PCR Negative      Influenza B PCR Negative      RSV PCR Negative      SARS CoV2 PCR Negative       Emergency Department Course:     Reviewed:  I reviewed nursing notes, vitals, past medical history, Care Everywhere and [facility]    Assessments:  0623 I obtained history and examined the patient as noted above.   0643 I rechecked the patient and explained findings. She reports to be feeling better.  1028 I rechecked the patient again.  1120 EMS Tech reported that the patient is walking around with O2 in mid 80s and HR in the 140's and she is on 2L of oxygen.     Consults:  1135 I consulted with Subha Pascual PA-C for Yahir Camarillo MD, MD from Hospitalist.    Interventions:  Medications   atorvastatin (LIPITOR) tablet 20 mg (has no administration in time range)   guaiFENesin (MUCINEX) 12 hr tablet 600 mg (has no administration in time range)   levothyroxine (SYNTHROID/LEVOTHROID) tablet 112 mcg (has no administration in time range)   losartan (COZAAR) tablet 25 mg (has no administration in time range)   lidocaine 1 % 0.1-1 mL  (has no administration in time range)   lidocaine (LMX4) cream (has no administration in time range)   sodium chloride (PF) 0.9% PF flush 3 mL (has no administration in time range)   sodium chloride (PF) 0.9% PF flush 3 mL (has no administration in time range)   melatonin tablet 1 mg (has no administration in time range)   ipratropium - albuterol 0.5 mg/2.5 mg/3 mL (DUONEB) neb solution 3 mL (has no administration in time range)   enoxaparin ANTICOAGULANT (LOVENOX) injection 30 mg (has no administration in time range)   acetaminophen (TYLENOL) tablet 650 mg (650 mg Oral Given 8/29/22 1307)     Or   acetaminophen (TYLENOL) Suppository 650 mg ( Rectal See Alternative 8/29/22 1307)   ibuprofen (ADVIL/MOTRIN) tablet 600 mg (has no administration in time range)   senna-docusate (SENOKOT-S/PERICOLACE) 8.6-50 MG per tablet 1 tablet (has no administration in time range)     Or   senna-docusate (SENOKOT-S/PERICOLACE) 8.6-50 MG per tablet 2 tablet (has no administration in time range)   polyethylene glycol (MIRALAX) Packet 17 g (has no administration in time range)   ondansetron (ZOFRAN ODT) ODT tab 4 mg (has no administration in time range)     Or   ondansetron (ZOFRAN) injection 4 mg (has no administration in time range)   hydrOXYzine (ATARAX) tablet 25 mg (has no administration in time range)     Or   hydrOXYzine (ATARAX) tablet 50 mg (has no administration in time range)   methylPREDNISolone sodium succinate (solu-MEDROL) injection 62.5 mg (has no administration in time range)   azithromycin (ZITHROMAX) tablet 500 mg (has no administration in time range)     Followed by   azithromycin (ZITHROMAX) tablet 250 mg (has no administration in time range)   albuterol (PROVENTIL) neb solution 2.5 mg (has no administration in time range)   LORazepam (ATIVAN) tablet 0.5-1 mg (has no administration in time range)   ipratropium - albuterol 0.5 mg/2.5 mg/3 mL (DUONEB) neb solution 6 mL (6 mLs Nebulization Given 8/29/22 8822)    LORazepam (ATIVAN) injection 1 mg (1 mg Intravenous Given 22 0648)   dexamethasone PF (DECADRON) injection 10 mg (10 mg Intravenous Given 22 0651)   azithromycin 500 mg (ZITHROMAX) in 0.9% NaCl 250 mL intermittent infusion 500 mg (0 mg Intravenous Stopped 22 0821)   0.9% sodium chloride BOLUS (0 mLs Intravenous Stopped 22 0808)   0.9% sodium chloride BOLUS (0 mLs Intravenous Stopped 22 1016)   iopamidol (ISOVUE-370) solution 500 mL (52 mLs Intravenous Given 22 0903)     Disposition:  The patient was admitted to the hospital under the care of Yahir Camarillo MD, MD from Hospitalist.    Impression & Plan     CMS Diagnoses: None.    Medical Decision Makinyof who presents for evaluation of shortness of breath and wheezing.  Signs and symptoms are consistent with COPD exacerbation.  A broad differential was considered including, reactive airway disease, pneumothorax, cardiac equivalent/ACS, viral induced wheezing, allergic phenomena, pneumonia, etc.  Patient feels improved after interventions here in ED but continues to have impairment in respiratory function including hypoxia and tachycardia on home O2 with minimal exertion.  Resting comfortably..  Given this, I will hospitalize for further intervention.      There are no signs at this point of any other serious etiologies including those mentioned above especially acute coronary syndrome. I doubt this is ACS given the classic story of COPD exacerbation given by the patient, the marked wheezing without rales and a nonspecific EKG despite SOB of symptoms.  No signs of pneumonia.      Given change in CT findings, antibiotics are indicated and first dose given in ED.  Discussed patient with hospitalist who agreed with inpatient care.      Diagnosis:    ICD-10-CM    1. COPD exacerbation (H)  J44.1    2. Acute and chronic respiratory failure with hypoxia (H)  J96.21      Scribe Disclosure:  I, Felecia Gonzalez, am serving as a  scribe at 6:33 AM on 8/29/2022 to document services personally performed by Sandro Can MD based on my observations and the provider's statements to me.     Sandro Can MD  08/29/22 2463

## 2022-08-29 NOTE — ED NOTES
"Pt up ambulated to restroom, comments \"I felt more short of breath walking, and it took a bit for me to recover but I'm doing ok now.\" pt was on 6L NC at time of ambulation.   "

## 2022-08-29 NOTE — PHARMACY-ADMISSION MEDICATION HISTORY
Admission medication history interview status for this patient is complete. See [facility] admission navigator for allergy information, prior to admission medications and immunization status.     Medication history interview done, indicate source(s): Patient  Medication history resources (including written lists, pill bottles, clinic record): RX refill hx  Pharmacy:  [facility]    Changes made to PTA medication list:  Added:  Xanax  Changed:  None  Reported as Not Taking:  None  Removed:  None    Actions taken by pharmacist (provider contacted, etc):None     Additional medication history information:   -Pt has been having anxiety issues, she was switched from Paxil to Prozac which didn't work for her & affected her breathing. She then was switched to Effexor XR 37.5mg, dose was not effective, dose was increased to 75mg which was too high, she then went back to Paxil & took a dose on Sunday 8/28/2022.    Medication reconciliation/reorder completed by provider prior to medication history?  No      Prior to Admission medications    Medication Sig Last Dose Taking? Auth Provider Long Term End Date   albuterol (PROAIR HFA/PROVENTIL HFA/VENTOLIN HFA) 108 (90 Base) MCG/ACT inhaler Inhale 2 puffs into the lungs every 4 hours as needed for shortness of breath / dyspnea or wheezing Inhale 1-2 Puffs every 4 hours as needed for Wheezing. prn Yes Mark Olsen, DO Yes    albuterol (PROVENTIL) (2.5 MG/3ML) 0.083% neb solution Take 1 vial (2.5 mg) by nebulization every 4 hours as needed for shortness of breath / dyspnea or wheezing prn Yes Mark Olsen, DO Yes    ALPRAZolam (XANAX) 0.25 MG tablet Take 0.5 mg by mouth nightly as needed for anxiety prn Yes Unknown, Entered By History     atorvastatin (LIPITOR) 20 MG tablet Take 20 mg by mouth daily 8/28/2022 at Unknown time Yes Reported, Patient Yes    benzonatate (TESSALON) 100 MG capsule Take 1 capsule (100 mg) by mouth 3 times daily as needed for cough prn Yes abby  DO Mark     fluticasone-salmeterol (ADVAIR-HFA) 230-21 MCG/ACT inhaler Inhale 2 puffs into the lungs 2 times daily 8/28/2022 at Unknown time Yes Mark Olsen DO Yes    guaiFENesin (MUCINEX) 600 MG 12 hr tablet Take 1 tablet (600 mg) by mouth 2 times daily 8/28/2022 at Unknown time Yes Mark Olsen DO     levothyroxine (SYNTHROID/LEVOTHROID) 112 MCG tablet Take 112 mcg by mouth daily 8/28/2022 at Unknown time Yes Reported, Patient Yes    losartan (COZAAR) 25 MG tablet Take 1 tablet (25 mg) by mouth daily 8/28/2022 at Unknown time Yes Kody Prieto,  Yes    PARoxetine (PAXIL) 10 MG tablet Take 1 tablet by mouth daily 8/28/2022 at Unknown time Yes Reported, Patient Yes    predniSONE (DELTASONE) 20 MG tablet Take 2 tablets (40 mg) by mouth daily 8/28/2022 at Unknown time Yes Mark Olsen DO

## 2022-08-29 NOTE — ED TRIAGE NOTES
Triage Assessment     Row Name 08/29/22 0631       Triage Assessment (Adult)    Airway WDL WDL       Skin Circulation/Temperature WDL    Skin Circulation/Temperature WDL WDL       Cardiac WDL    Cardiac WDL WDL       Peripheral/Neurovascular WDL    Peripheral Neurovascular WDL WDL       Cognitive/Neuro/Behavioral WDL    Cognitive/Neuro/Behavioral WDL WDL

## 2022-08-29 NOTE — PROGRESS NOTES
Pt a/o x4. VSS, on 2 L nasal canula. C/o of headache, PRN tylenol given. Pt ambulated to bathroom SBA. Dyspnea on exertion. Treating with abx. Continue monitoring O2.

## 2022-08-29 NOTE — H&P
[facility]    History and Physical - Hospitalist Service       Date of Admission:  8/29/2022    Assessment & Plan      Lara Melendez is a 56 year old female with PMHx significant for COPD, HTN, HLP, hypothyroidism and anxiety, who was admitted on 8/29/2022 with SOB and hypoxia. She notes a significant increase in her anxiety over the past year which does contribute to her symptoms.     1. Acute respiratory failure with hypoxia  COPD exacerbation  Started new antidepressant/anxiety medication on Wednesday (Prozac), developed chest tightness and SOB that has not improved since that time. Started PO Prednisone for COPD exacerbation without improvement. Has home O2 for use as needed, has required supplemental O2 since Wednesday, measuring low SpO2 at home as well. Denies significant cough, denies fever, chest pain. Using home inhalers and albuterol nebs without improvement.   Placed on CPAP by EMS, 89% on room air here, placed on 5L supplemental O2 via nasal cannula, now weaned down to 2L. Tachycardic, VS otherwise stable. Labs unremarkable. Received azithromycin, Duoneb, and 10 mg IV decadron in ED.  - admit to inpatient   - continue IV solu-medrol on admission, 60 mg daily  - continue scheduled Duonebs, PRN albuterol nebs  - no definitive evidence of pneumonia on CT chest, findings noted consistent with bronchitis. Given higher risk for progression, will continue PO Azithromycin.  - wean O2 as able  - continue home Mucinex  - hold home inhalers for now  - manage anxiety per below    2. Anxiety   Significant increase in anxiety over the past year, she has had several COPD exacerbations following an episode of mandibular abscess and presumed osteomyelitis, s/p I&D in Jan 2022. Also had Covid infection in May. Has been working with PCP to better manage anxiety. Was on Paxil, Effexor added. Now switched Paxil to Prozac but sx started per above, last dose of Prozac was on Saturday. Unclear if still  taking Effexor, med rec pending.  - I believe pt plans to resume Paxil. Continue home meds once med rec completed. If not taking Effexor, consider Buspar in addition to Paxil  - hydroxyzine PRN for anxiety  - ativan PRN for anxiety  - close follow up with PCP. Consider therapy    3. HTN  Resume home meds with parameters, appears to be on losartan  4. HLP  Resume home statin  5. Hypothyroidism   Resume home meds     Covid-19 negative     Diet:   regular diet  DVT Prophylaxis: Enoxaparin (Lovenox) SQ  Hines Catheter: Not present  Central Lines: None  Cardiac Monitoring: None  Code Status:   full code    Clinically Significant Risk Factors Present on Admission                 # Hypertension: home medication list includes antihypertensive(s)          Disposition Plan    2-3 days     The patient's care was discussed with the Attending Physician, Dr. Beaulieu, Patient and ED provider, Dr. Can.    Subha Pascual PA-C  Hospitalist Service  [facility]  Securely message with the Vocera Web Console (learn more here)  Text page via [employer] Paging/Directory         ______________________________________________________________________    Chief Complaint   SOB    History is obtained from the patient    History of Present Illness   Lara Melendez is a 56 year old female with PMHx significant for COPD, HTN, HLP, hypothyroidism and anxiety, who presents to the ED with shortness of breath.  Patient notes on Wednesday she started a new anxiety medication and noted chest tightness.  She felt like her COPD is acting up so she called her PCP and got a prescription for prednisone.  Since then, her shortness of breath has progressively worsened and she has required submental O2 at home every day.  Her symptoms have not improved with prednisone.  She took her last dose of the new anxiety medicine on Saturday without improvement.  She denies any specific exposures.  She denies fever, chills, chest pain,  abdominal pain, nausea, vomiting, diarrhea or dysuria.  Overall, she has noted a significant increase in her anxiety over this past year.  At the beginning of the year, she had a mandibular abscess and was treated with incision and drainage and antibiotics for suspected osteomyelitis.  She was hypoxic in the hospital following that surgery and was discharged home on home oxygen.  Since that time, she has had multiple COPD exacerbations and occasionally requires oxygen at home.  She then developed COVID in May which further increase her anxiety.  She is working with her primary care doctor to adjust her medications to help better manage her anxiety but nothing at this point has helped.  In the ED, SPO2 was 89% on room air, she was placed on 5 L of minute O2 via nasal cannula.  She was tachycardic, vital signs otherwise stable.  The ED provider noted that EMS had initially placed the patient on CPAP in route.  CMP is fairly unremarkable other than very slightly elevated AST and ALT.  BNP and troponin are within normal limits.  Lactic acid is within normal limits.  CBC is unremarkable.  COVID and influenza are negative.'s EKG shows sinus tachycardia.  CT of the chest is negative for PE, does show bilateral regions of peribronchial wall thickening in the skin packs and that may reflect infectious or phonatory bronchitis.  She was given a 1 L normal saline bolus, 500 mg IV azithromycin, 10 mg IV Decadron, DuoNeb and 1 mg IV Ativan.  She will be admitted for further management of COPD exacerbation.    Review of Systems    The 10 point Review of Systems is negative other than noted in the HPI or here.     Past Medical History    I have reviewed this patient's medical history and updated it with pertinent information if needed.   Past Medical History:   Diagnosis Date     Anxiety      COPD (chronic obstructive pulmonary disease)      Hypercholesterolemia      Hypertension      Hypothyroidism        Past Surgical History   I  have reviewed this patient's surgical history and updated it with pertinent information if needed.  Past Surgical History:   Procedure Laterality Date     CHOLECYSTECTOMY       INCISION AND DRAINAGE MANDIBLE, COMBINED Left 01/10/2022    Procedure: INCISION AND DRAINAGE, MANDIBLE;  Surgeon: Jn Feliz DDS;  Location: [facility] OR     INCISION AND DRAINAGE MANDIBLE, COMBINED Left 02/04/2022    Procedure: INCISION AND DRAINAGE, MANDIBLE;  Surgeon: Taylor Ortez DDS;  Location: [facility] OR     TOOTH EXTRACTION         Social History   I have reviewed this patient's social history and updated it with pertinent information if needed.  Social History     Tobacco Use     Smoking status: Never Smoker     Smokeless tobacco: Never Used   Substance Use Topics     Alcohol use: Yes     Comment: occ     Drug use: Never       Family History     Reviewed and non contributory     Prior to Admission Medications   Prior to Admission Medications   Prescriptions Last Dose Informant Patient Reported? Taking?   PARoxetine (PAXIL) 10 MG tablet 8/28/2022 at Unknown time Self Yes Yes   Sig: Take 1 tablet by mouth daily   albuterol (PROAIR HFA/PROVENTIL HFA/VENTOLIN HFA) 108 (90 Base) MCG/ACT inhaler 8/28/2022 at Unknown time  No Yes   Sig: Inhale 2 puffs into the lungs every 4 hours as needed for shortness of breath / dyspnea or wheezing Inhale 1-2 Puffs every 4 hours as needed for Wheezing.   albuterol (PROVENTIL) (2.5 MG/3ML) 0.083% neb solution 8/28/2022 at Unknown time  No Yes   Sig: Take 1 vial (2.5 mg) by nebulization every 4 hours as needed for shortness of breath / dyspnea or wheezing   atorvastatin (LIPITOR) 20 MG tablet 8/28/2022 at Unknown time Self Yes Yes   Sig: Take 20 mg by mouth daily   benzonatate (TESSALON) 100 MG capsule 8/28/2022 at Unknown time  No Yes   Sig: Take 1 capsule (100 mg) by mouth 3 times daily as needed for cough   fluticasone-salmeterol (ADVAIR-HFA) 230-21 MCG/ACT inhaler 8/28/2022 at Unknown time  No Yes   Sig:  Inhale 2 puffs into the lungs 2 times daily   guaiFENesin (MUCINEX) 600 MG 12 hr tablet 8/28/2022 at Unknown time  No Yes   Sig: Take 1 tablet (600 mg) by mouth 2 times daily   levothyroxine (SYNTHROID/LEVOTHROID) 112 MCG tablet 8/28/2022 at Unknown time Self Yes Yes   Sig: Take 112 mcg by mouth daily   losartan (COZAAR) 25 MG tablet 8/28/2022 at Unknown time  No Yes   Sig: Take 1 tablet (25 mg) by mouth daily   predniSONE (DELTASONE) 20 MG tablet 8/28/2022 at Unknown time  No Yes   Sig: Take 2 tablets (40 mg) by mouth daily      Facility-Administered Medications: None     Allergies   Allergies   Allergen Reactions     Sulfa Drugs      Penicillins Rash     Generalized rash  Rash, Generalized         Physical Exam   Vital Signs: Temp: 98  F (36.7  C) Temp src: Oral BP: 133/82 Pulse: 105   Resp: 14 SpO2: 94 % O2 Device: Nasal cannula Oxygen Delivery: 2 LPM  Weight: 130 lbs 0 oz    GENERAL:  Comfortable.  PSYCH: pleasant, oriented, No acute distress.  HEENT:  Atraumatic, normocephalic. Normal conjunctiva, normal hearing, and oropharynx is normal.  NECK:  Supple, no neck vein distention  HEART:  Tachycardic but normal S1, S2 with no murmur, no pericardial rub, gallops or S3 or S4.  LUNGS:  Slight expiratory wheeze, diminished throughout, otherwise clear to auscultation, mildly dyspneic.  GI:  Soft, normal bowel sounds. Non-tender, non distended.   EXTREMITIES:  No pedal edema, +2 pulses bilateral and equal.  SKIN:  Dry to touch, No rash, wound or ulcerations.  NEUROLOGIC:  CN 2-12 intact, BL 5/5 symmetric upper and lower extremity strength, sensation is intact with no focal deficits.      Data   Data reviewed today: I reviewed all medications, new labs and imaging results over the last 24 hours. I personally reviewed the EKG tracing showing sinus tachycardia and the chest CT image(s) showing no PE, bilateral regions of peribronchial wall thickening and mucus impaction which may reflect infectious or inflammatory  bronchitis. .    Most Recent 3 CBC's:Recent Labs   Lab Test 08/29/22  0635 05/17/22  0815 05/16/22  0813   WBC 7.5 6.2 6.4   HGB 14.8 13.7 13.2   * 99 103*    200 196     Most Recent 3 BMP's:Recent Labs   Lab Test 08/29/22  0635 05/17/22  0815 05/16/22  0813    136 137   POTASSIUM 4.3 4.5 4.6   CHLORIDE 102 103 106   CO2 23 29 31   BUN 8.1 17 14   CR 0.55 0.59 0.59   ANIONGAP 13 4 <1*   EDIN 9.3 8.8 8.7   * 124* 138*     Most Recent 2 LFT's:Recent Labs   Lab Test 08/29/22  0635 05/15/22  0639   AST 61* 27   ALT 55* 42   ALKPHOS 79 89   BILITOTAL 0.4 0.8     Most Recent 3 Troponin's:No lab results found.  Most Recent 3 BNP's:Recent Labs   Lab Test 08/29/22  0635 05/14/22  1222 04/02/22  0535   NTBNPI 195 178 309     Recent Results (from the past 24 hour(s))   CT Chest Pulmonary Embolism w Contrast    Narrative    CT CHEST PULMONARY EMBOLISM WITH CONTRAST  8/29/2022 9:10 AM    CLINICAL HISTORY: Hypoxia.    TECHNIQUE: CT angiogram chest during arterial phase injection IV  contrast. 2D and 3D MIP reconstructions were performed by the CT  technologist. Dose reduction techniques were used.     CONTRAST: 52 mL Isovue-370    COMPARISON: CT chest 2/21/2022, chest x-ray 1/30/2006.    FINDINGS:  ANGIOGRAM CHEST: Pulmonary arteries are normal caliber and negative  for pulmonary emboli. Thoracic aorta is negative for dissection. No CT  evidence of right heart strain.    LUNGS AND PLEURA: Emphysema. Clusters of calcified nodules at the left  upper lobe again identified. This is also present on the remote chest  x-ray from 2006. Associated mild areas of peribronchial wall  thickening leading to the left upper lobe and small mucus impaction.  Some peribronchial wall thickening also noted at the bilateral lower  lungs. No acute airspace consolidation. No effusion. Stable nodule  right upper lobe measuring 0.3 cm, series 7 image 58.    MEDIASTINUM/AXILLAE: No suspicious enlarged lymph nodes or  acute  abnormality at the mediastinum. Stable soft tissue density at the  anterior lower mediastinum extending to the inferior xiphoid process,  likely muscular in origin.    CORONARY ARTERY CALCIFICATION: None.    UPPER ABDOMEN: Cholecystectomy. No acute upper abdominal abnormality.    MUSCULOSKELETAL: No acute abnormality.      Impression    IMPRESSION:  1.  No evidence for pulmonary embolism.  2.  Bilateral regions of peribronchial wall thickening and mucus  impaction may reflect an infectious or inflammatory bronchitis.  3.  Stable pulmonary nodule at the right upper lobe, see below for  follow-up.    Recommendations for one or multiple incidental lung nodules < 6mm:    Low risk patients: No routine follow-up.    High risk patients: Optional follow-up CT at 12 months; if  unchanged, no further follow-up.    *Low Risk: Minimal or absent history of smoking or other known risk  factors.  *Nonsolid (ground glass) or partly solid nodules may require longer  follow-up to exclude indolent adenocarcinoma.  *Recommendations based on Guidelines for the Management of Incidental  Pulmonary Nodules Detected at CT: From the Fleischner Society 2017,  Radiology 2017.

## 2022-08-29 NOTE — ED NOTES
[facility]  ED Nurse Handoff Report    Lara Melendez is a 56 year old female   ED Chief complaint: Shortness of Breath  . ED Diagnosis:   Final diagnoses:   COPD exacerbation (H)   Acute and chronic respiratory failure with hypoxia (H)     Allergies:   Allergies   Allergen Reactions     Sulfa Drugs      Penicillins Rash     Generalized rash  Rash, Generalized         Code Status: Full Code  Activity level - Baseline/Home:  Independent. Activity Level - Current:   Stand by Assist. Lift room needed: No. Bariatric: No   Needed: No   Isolation: No. Infection: Not Applicable.     Vital Signs:   Vitals:    08/29/22 1020 08/29/22 1030 08/29/22 1140 08/29/22 1210   BP:  133/82     Pulse:  105     Resp:       Temp:       TempSrc:       SpO2: 96%  93% 94%   Weight:       Height:           Cardiac Rhythm:  ,      Pain level:    Patient confused: No. Patient Falls Risk: No.   Elimination Status: Has voided   Patient Report - Initial Complaint: SOB. Focused Assessment: pt increased oxygen need with ambulation. Increased WOB   Tests Performed: see results. Abnormal Results: see results.  Labs Ordered and Resulted from Time of ED Arrival to Time of ED Departure   COMPREHENSIVE METABOLIC PANEL - Abnormal       Result Value    Sodium 138      Potassium 4.3      Creatinine 0.55      Urea Nitrogen 8.1      Chloride 102      Carbon Dioxide (CO2) 23      Anion Gap 13      Glucose 102 (*)     Calcium 9.3      Protein Total 7.8      Albumin 4.5      Bilirubin Total 0.4      Alkaline Phosphatase 79      AST 61 (*)     ALT 55 (*)     GFR Estimate >90     BLOOD GAS VENOUS WITH OXYHEMOGLOBIN - Abnormal    pH Venous 7.41      pCO2 Venous 41      pO2 Venous 57 (*)     Bicarbonate Venous 26      FIO2 6      Oxyhemoglobin Venous 88 (*)     Base Excess/Deficit (+/-) 1.2     CBC WITH PLATELETS AND DIFFERENTIAL - Abnormal    WBC Count 7.5      RBC Count 4.33      Hemoglobin 14.8      Hematocrit 44.1       (*)      MCH 34.2 (*)     MCHC 33.6      RDW 12.4      Platelet Count 171      % Neutrophils 48      % Lymphocytes 40      % Monocytes 9      % Eosinophils 3      % Basophils 0      % Immature Granulocytes 0      NRBCs per 100 WBC 0      Absolute Neutrophils 3.6      Absolute Lymphocytes 3.0      Absolute Monocytes 0.6      Absolute Eosinophils 0.2      Absolute Basophils 0.0      Absolute Immature Granulocytes 0.0      Absolute NRBCs 0.0     ISTAT GASES LACTATE VENOUS POCT - Abnormal    Lactic Acid POCT 0.7      Bicarbonate Venous POCT 25      O2 Sat, Venous POCT 90 (*)     pCO2V Venous POCT 40      pH Venous POCT 7.40      pO2 Venous POCT 58 (*)    TROPONIN T, HIGH SENSITIVITY - Normal    Troponin T, High Sensitivity 8     NT PROBNP INPATIENT - Normal    N terminal Pro BNP Inpatient 195     INFLUENZA A/B & SARS-COV2 PCR MULTIPLEX - Normal    Influenza A PCR Negative      Influenza B PCR Negative      RSV PCR Negative      SARS CoV2 PCR Negative       CT Chest Pulmonary Embolism w Contrast   Preliminary Result   IMPRESSION:   1.  No evidence for pulmonary embolism.   2.  Bilateral regions of peribronchial wall thickening and mucus   impaction may reflect an infectious or inflammatory bronchitis.   3.  Stable pulmonary nodule at the right upper lobe, see below for   follow-up.      Recommendations for one or multiple incidental lung nodules < 6mm:     Low risk patients: No routine follow-up.     High risk patients: Optional follow-up CT at 12 months; if   unchanged, no further follow-up.      *Low Risk: Minimal or absent history of smoking or other known risk   factors.   *Nonsolid (ground glass) or partly solid nodules may require longer   follow-up to exclude indolent adenocarcinoma.   *Recommendations based on Guidelines for the Management of Incidental   Pulmonary Nodules Detected at CT: From the Fleischner Society 2017,   Radiology 2017.         Treatments provided: see MAR  Family Comments: pt able to update family    OBS brochure/video discussed/provided to patient:  N/A  ED Medications:   Medications   0.9% sodium chloride BOLUS (0 mLs Intravenous Stopped 8/29/22 0808)     Followed by   sodium chloride 0.9% infusion (has no administration in time range)   ipratropium - albuterol 0.5 mg/2.5 mg/3 mL (DUONEB) neb solution 6 mL (6 mLs Nebulization Given 8/29/22 0713)   LORazepam (ATIVAN) injection 1 mg (1 mg Intravenous Given 8/29/22 0648)   dexamethasone PF (DECADRON) injection 10 mg (10 mg Intravenous Given 8/29/22 0651)   azithromycin 500 mg (ZITHROMAX) in 0.9% NaCl 250 mL intermittent infusion 500 mg (0 mg Intravenous Stopped 8/29/22 0821)   0.9% sodium chloride BOLUS (0 mLs Intravenous Stopped 8/29/22 1016)   iopamidol (ISOVUE-370) solution 500 mL (52 mLs Intravenous Given 8/29/22 0903)     Drips infusing:  No  For the majority of the shift, the patient's behavior Green. Interventions performed were n/a.    Sepsis treatment initiated: No     Patient tested for COVID 19 prior to admission: YES    ED Nurse Name/Phone Number: Ramiro Ponce RN,   12:20 PM  RECEIVING UNIT ED HANDOFF REVIEW    Above ED Nurse Handoff Report was reviewed: Yes  Reviewed by: Anika Morley RN on August 29, 2022 at 1:13 PM

## 2022-08-29 NOTE — ED TRIAGE NOTES
Pt brought in by ambulance, sob started on wed, had covid early may Thursday started on steroids .

## 2022-08-30 LAB
ANION GAP SERPL CALCULATED.3IONS-SCNC: 7 MMOL/L (ref 7–15)
BASOPHILS # BLD AUTO: 0 10E3/UL (ref 0–0.2)
BASOPHILS NFR BLD AUTO: 0 %
BUN SERPL-MCNC: 7.8 MG/DL (ref 6–20)
CALCIUM SERPL-MCNC: 9.3 MG/DL (ref 8.6–10)
CHLORIDE SERPL-SCNC: 100 MMOL/L (ref 98–107)
CREAT SERPL-MCNC: 0.55 MG/DL (ref 0.51–0.95)
DEPRECATED HCO3 PLAS-SCNC: 28 MMOL/L (ref 22–29)
EOSINOPHIL # BLD AUTO: 0 10E3/UL (ref 0–0.7)
EOSINOPHIL NFR BLD AUTO: 0 %
ERYTHROCYTE [DISTWIDTH] IN BLOOD BY AUTOMATED COUNT: 12.2 % (ref 10–15)
GFR SERPL CREATININE-BSD FRML MDRD: >90 ML/MIN/1.73M2
GLUCOSE SERPL-MCNC: 118 MG/DL (ref 70–99)
HCT VFR BLD AUTO: 40.8 % (ref 35–47)
HGB BLD-MCNC: 13.4 G/DL (ref 11.7–15.7)
IMM GRANULOCYTES # BLD: 0.1 10E3/UL
IMM GRANULOCYTES NFR BLD: 1 %
LYMPHOCYTES # BLD AUTO: 0.6 10E3/UL (ref 0.8–5.3)
LYMPHOCYTES NFR BLD AUTO: 10 %
MCH RBC QN AUTO: 33.6 PG (ref 26.5–33)
MCHC RBC AUTO-ENTMCNC: 32.8 G/DL (ref 31.5–36.5)
MCV RBC AUTO: 102 FL (ref 78–100)
MONOCYTES # BLD AUTO: 0.5 10E3/UL (ref 0–1.3)
MONOCYTES NFR BLD AUTO: 9 %
NEUTROPHILS # BLD AUTO: 4.8 10E3/UL (ref 1.6–8.3)
NEUTROPHILS NFR BLD AUTO: 80 %
NRBC # BLD AUTO: 0 10E3/UL
NRBC BLD AUTO-RTO: 0 /100
PLATELET # BLD AUTO: 139 10E3/UL (ref 150–450)
POTASSIUM SERPL-SCNC: 4.2 MMOL/L (ref 3.4–5.3)
RBC # BLD AUTO: 3.99 10E6/UL (ref 3.8–5.2)
SODIUM SERPL-SCNC: 135 MMOL/L (ref 136–145)
WBC # BLD AUTO: 6 10E3/UL (ref 4–11)

## 2022-08-30 PROCEDURE — 250N000009 HC RX 250: Performed by: INTERNAL MEDICINE

## 2022-08-30 PROCEDURE — 80048 BASIC METABOLIC PNL TOTAL CA: CPT | Performed by: PHYSICIAN ASSISTANT

## 2022-08-30 PROCEDURE — 250N000013 HC RX MED GY IP 250 OP 250 PS 637: Performed by: PHYSICIAN ASSISTANT

## 2022-08-30 PROCEDURE — 120N000001 HC R&B MED SURG/OB

## 2022-08-30 PROCEDURE — 85025 COMPLETE CBC W/AUTO DIFF WBC: CPT | Performed by: PHYSICIAN ASSISTANT

## 2022-08-30 PROCEDURE — 250N000011 HC RX IP 250 OP 636: Performed by: PHYSICIAN ASSISTANT

## 2022-08-30 PROCEDURE — 36415 COLL VENOUS BLD VENIPUNCTURE: CPT | Performed by: PHYSICIAN ASSISTANT

## 2022-08-30 PROCEDURE — 99232 SBSQ HOSP IP/OBS MODERATE 35: CPT | Performed by: INTERNAL MEDICINE

## 2022-08-30 PROCEDURE — 94640 AIRWAY INHALATION TREATMENT: CPT

## 2022-08-30 PROCEDURE — 999N000157 HC STATISTIC RCP TIME EA 10 MIN

## 2022-08-30 PROCEDURE — 94640 AIRWAY INHALATION TREATMENT: CPT | Mod: 76

## 2022-08-30 RX ORDER — BENZONATATE 100 MG/1
100 CAPSULE ORAL 3 TIMES DAILY PRN
Status: DISCONTINUED | OUTPATIENT
Start: 2022-08-30 | End: 2022-08-31 | Stop reason: HOSPADM

## 2022-08-30 RX ORDER — ALPRAZOLAM 0.25 MG
0.5 TABLET ORAL
Status: DISCONTINUED | OUTPATIENT
Start: 2022-08-30 | End: 2022-08-31 | Stop reason: HOSPADM

## 2022-08-30 RX ADMIN — IPRATROPIUM BROMIDE AND ALBUTEROL SULFATE 3 ML: .5; 3 SOLUTION RESPIRATORY (INHALATION) at 11:30

## 2022-08-30 RX ADMIN — LOSARTAN POTASSIUM 25 MG: 25 TABLET, FILM COATED ORAL at 08:08

## 2022-08-30 RX ADMIN — GUAIFENESIN 600 MG: 600 TABLET, EXTENDED RELEASE ORAL at 08:08

## 2022-08-30 RX ADMIN — METHYLPREDNISOLONE SODIUM SUCCINATE 62.5 MG: 125 INJECTION, POWDER, FOR SOLUTION INTRAMUSCULAR; INTRAVENOUS at 09:53

## 2022-08-30 RX ADMIN — LEVOTHYROXINE SODIUM 112 MCG: 0.11 TABLET ORAL at 08:08

## 2022-08-30 RX ADMIN — ATORVASTATIN CALCIUM 20 MG: 20 TABLET, FILM COATED ORAL at 20:43

## 2022-08-30 RX ADMIN — IPRATROPIUM BROMIDE AND ALBUTEROL SULFATE 3 ML: .5; 3 SOLUTION RESPIRATORY (INHALATION) at 15:20

## 2022-08-30 RX ADMIN — PAROXETINE HYDROCHLORIDE 10 MG: 10 TABLET, FILM COATED ORAL at 08:08

## 2022-08-30 RX ADMIN — AZITHROMYCIN MONOHYDRATE 250 MG: 250 TABLET ORAL at 08:08

## 2022-08-30 RX ADMIN — IPRATROPIUM BROMIDE AND ALBUTEROL SULFATE 3 ML: .5; 3 SOLUTION RESPIRATORY (INHALATION) at 20:43

## 2022-08-30 RX ADMIN — GUAIFENESIN 600 MG: 600 TABLET, EXTENDED RELEASE ORAL at 20:43

## 2022-08-30 RX ADMIN — ENOXAPARIN SODIUM 30 MG: 30 INJECTION SUBCUTANEOUS at 13:26

## 2022-08-30 RX ADMIN — HYDROXYZINE HYDROCHLORIDE 25 MG: 25 TABLET ORAL at 05:25

## 2022-08-30 RX ADMIN — IPRATROPIUM BROMIDE AND ALBUTEROL SULFATE 3 ML: .5; 3 SOLUTION RESPIRATORY (INHALATION) at 07:26

## 2022-08-30 RX ADMIN — ACETAMINOPHEN 650 MG: 325 TABLET, FILM COATED ORAL at 13:26

## 2022-08-30 ASSESSMENT — ACTIVITIES OF DAILY LIVING (ADL)
ADLS_ACUITY_SCORE: 20

## 2022-08-30 NOTE — PROGRESS NOTES
[facility]    Medicine Progress Note - Hospitalist Service    Date of Admission:  8/29/2022    Assessment & Plan               Lara Melendez is a 56 year old female with PMHx significant for COPD, HTN, HLP, hypothyroidism and anxiety, who was admitted on 8/29/2022 with SOB and hypoxia. She notes a significant increase in her anxiety over the past year which does contribute to her symptoms.     1. Acute respiratory failure with hypoxia  COPD exacerbation    -Wheezing improving  -Titrate and adjust oxygen as able currently on 2 L by nasal cannula  -Receiving corticosteroids and to be continued for now intravenously  -If remained stable and still demonstrating clinical improvement can titrate and switch to oral prednisone in the next 24 hours  -continue supportive care  -Remain on breathing treatments and as needed albuterol neb      2. Anxiety   Significant increase in anxiety over the past year, she has had several COPD exacerbations following an episode of mandibular abscess and presumed osteomyelitis, s/p I&D in Jan 2022. Also had Covid infection in May. Has been working with PCP to better manage anxiety. Was on Paxil, Effexor added. Now switched Paxil to Prozac but sx started per above, last dose of Prozac was on Saturday. Unclear if still taking Effexor, med rec pending.  - I believe pt plans to resume Paxil. Continue home meds once med rec completed. If not taking Effexor, consider Buspar in addition to Paxil  - hydroxyzine PRN for anxiety  - ativan PRN for anxiety  - close follow up with PCP. Consider therapy    3. HTN  Resume home meds with parameters, appears to be on losartan  4. HLP  Resume home statin  5. Hypothyroidism   Resume home meds     Covid-19 negative       Diet: Combination Diet Regular Diet Adult    DVT Prophylaxis: Enoxaparin (Lovenox) SQ  Hines Catheter: Not present  Central Lines: None  Cardiac Monitoring: None  Code Status: Full Code      Disposition Plan      Expected Discharge Date: 08/31/2022                The patient's care was discussed with the Bedside Nurse and Patient.    Yahir Beaulieu MD, MD  Hospitalist Service  [facility]  Securely message with the [employer]ocera Web Console (learn more here)  Text page via [employer] Paging/Directory         Clinically Significant Risk Factors Present on Admission                 # Hypertension: home medication list includes antihypertensive(s)          ______________________________________________________________________    Interval History   Continuing service care for this a very pleasant lady.  She mentioned that she was able to get some sleep last night with decreasing anxiety levels at the same time improving shortness of breath.  Much decreased wheezing as well.  No worsening coughing spells.  Denies any fever spikes no chills.  Currently tolerating oral diet with no reported nausea, vomiting or diarrhea.  No bleeding tendencies.  No reported mental status changes.    Data reviewed today: I reviewed all medications, new labs and imaging results over the last 24 hours. I personally reviewed .    Physical Exam   Vital Signs: Temp: 98.9  F (37.2  C) Temp src: Oral BP: (!) 164/83 Pulse: 94   Resp: 22 SpO2: 94 % O2 Device: Nasal cannula Oxygen Delivery: 2 LPM  Weight: 136 lbs 1.6 oz  HEENT; Atraumatic, normocephalic, pinkish conjuctiva, pupils bilateral reactive   Skin: warm and moist, no rashes  Lymphatics: no cervical or axillary lymphandenopathy  Lungs: Fair air entry, scant scattered wheezes in all lung fields, no crackles  Heart: normal rate, normal rhythm, no rubs or gallops.   Abdomen: normal bowel sounds, no tenderness, no peritoneal signs, no guarding  Extremities: no deformities, no edema   Neuro; follow commands, alert and oriented x3, spontaneous speech, coherent, moves all extremities spontaneously  Psych; no hallucination, euthymic mood, not agitated        Data   Recent Labs   Lab  08/30/22  0624 08/29/22  0635   WBC 6.0 7.5   HGB 13.4 14.8   * 102*   * 171   * 138   POTASSIUM 4.2 4.3   CHLORIDE 100 102   CO2 28 23   BUN 7.8 8.1   CR 0.55 0.55   ANIONGAP 7 13   EDIN 9.3 9.3   * 102*   ALBUMIN  --  4.5   PROTTOTAL  --  7.8   BILITOTAL  --  0.4   ALKPHOS  --  79   ALT  --  55*   AST  --  61*     No results found for this or any previous visit (from the past 24 hour(s)).  Medications       atorvastatin  20 mg Oral QPM     azithromycin  500 mg Oral Once    Followed by     azithromycin  250 mg Oral Daily     enoxaparin ANTICOAGULANT  30 mg Subcutaneous Q24H     guaiFENesin  600 mg Oral BID     ipratropium - albuterol 0.5 mg/2.5 mg/3 mL  3 mL Nebulization 4x daily     levothyroxine  112 mcg Oral Daily     losartan  25 mg Oral Daily     methylPREDNISolone  62.5 mg Intravenous Daily     PARoxetine  10 mg Oral Daily     sodium chloride (PF)  3 mL Intracatheter Q8H

## 2022-08-30 NOTE — PLAN OF CARE
Goal Outcome Evaluation:    Pertinent assessments: Assumed care of pt from [id number]. AVSS. A&Ox4. Apical pulse regular. Lungs CTA. Bowel sounds active in all quadrants. Tolerating regular diet. Voiding spontaneously with adequate output. PIV SL, denies pain after Tylenol given for headache.  Major Shift Events: uneventful  Treatment Plan: wean O2 as able, encourage OOB activity

## 2022-08-30 NOTE — PLAN OF CARE
Pertinent assessments: Pt A&OX4. VSS and on 2L. Denied pain. SOB with activity. SBA, independent in the room. Atarax 1x given. Slept well.   Major Shift Events None  Treatment Plan: Monitor O2, IV steroids, monitor anxiety.  Bedside Nurse: Es León RN

## 2022-08-30 NOTE — PLAN OF CARE
Goal Outcome Evaluation:      End of Shift Summary  For vital signs and complete assessments, please see documentation flowsheets.     Pertinent assessments: Pt A&OX4. VSS and on 2L. Denied pain. Anxiety decreased after prn med given. Some BLUE noted with movement.Able to ambulate SBA. Had a good appetite and no nausea.    Major Shift Events :None    Treatment Plan: Monitor O2, IV steroids, monitor anxiety.

## 2022-08-31 VITALS
RESPIRATION RATE: 16 BRPM | HEART RATE: 73 BPM | DIASTOLIC BLOOD PRESSURE: 84 MMHG | WEIGHT: 136.1 LBS | TEMPERATURE: 97.9 F | OXYGEN SATURATION: 97 % | BODY MASS INDEX: 23.23 KG/M2 | SYSTOLIC BLOOD PRESSURE: 143 MMHG | HEIGHT: 64 IN

## 2022-08-31 PROCEDURE — 250N000011 HC RX IP 250 OP 636: Performed by: PHYSICIAN ASSISTANT

## 2022-08-31 PROCEDURE — 94640 AIRWAY INHALATION TREATMENT: CPT | Mod: 76

## 2022-08-31 PROCEDURE — 99239 HOSP IP/OBS DSCHRG MGMT >30: CPT | Performed by: HOSPITALIST

## 2022-08-31 PROCEDURE — 250N000013 HC RX MED GY IP 250 OP 250 PS 637: Performed by: PHYSICIAN ASSISTANT

## 2022-08-31 PROCEDURE — 999N000157 HC STATISTIC RCP TIME EA 10 MIN

## 2022-08-31 PROCEDURE — 94640 AIRWAY INHALATION TREATMENT: CPT

## 2022-08-31 PROCEDURE — 250N000009 HC RX 250: Performed by: INTERNAL MEDICINE

## 2022-08-31 RX ORDER — PREDNISONE 20 MG/1
40 TABLET ORAL DAILY
Qty: 8 TABLET | Refills: 0 | Status: SHIPPED | OUTPATIENT
Start: 2022-08-31 | End: 2022-08-31

## 2022-08-31 RX ORDER — AZITHROMYCIN 250 MG/1
250 TABLET, FILM COATED ORAL DAILY
Qty: 2 TABLET | Refills: 0 | Status: SHIPPED | OUTPATIENT
Start: 2022-09-01 | End: 2022-09-26

## 2022-08-31 RX ORDER — HYDROXYZINE HYDROCHLORIDE 25 MG/1
25 TABLET, FILM COATED ORAL 3 TIMES DAILY PRN
Qty: 6 TABLET | Refills: 0 | Status: SHIPPED | OUTPATIENT
Start: 2022-08-31 | End: 2022-08-31

## 2022-08-31 RX ORDER — HYDROXYZINE HYDROCHLORIDE 25 MG/1
25 TABLET, FILM COATED ORAL 3 TIMES DAILY PRN
Qty: 6 TABLET | Refills: 0 | Status: SHIPPED | OUTPATIENT
Start: 2022-08-31 | End: 2022-09-26

## 2022-08-31 RX ORDER — PREDNISONE 20 MG/1
40 TABLET ORAL DAILY
Qty: 10 TABLET | Refills: 0 | Status: SHIPPED | OUTPATIENT
Start: 2022-08-31 | End: 2022-09-05

## 2022-08-31 RX ORDER — AZITHROMYCIN 250 MG/1
250 TABLET, FILM COATED ORAL DAILY
Qty: 2 TABLET | Refills: 0 | Status: SHIPPED | OUTPATIENT
Start: 2022-09-01 | End: 2022-08-31

## 2022-08-31 RX ORDER — PREDNISONE 20 MG/1
40 TABLET ORAL DAILY
Status: DISCONTINUED | OUTPATIENT
Start: 2022-09-01 | End: 2022-08-31 | Stop reason: HOSPADM

## 2022-08-31 RX ADMIN — IPRATROPIUM BROMIDE AND ALBUTEROL SULFATE 3 ML: .5; 3 SOLUTION RESPIRATORY (INHALATION) at 07:20

## 2022-08-31 RX ADMIN — ACETAMINOPHEN 650 MG: 325 TABLET, FILM COATED ORAL at 08:18

## 2022-08-31 RX ADMIN — GUAIFENESIN 600 MG: 600 TABLET, EXTENDED RELEASE ORAL at 08:11

## 2022-08-31 RX ADMIN — IPRATROPIUM BROMIDE AND ALBUTEROL SULFATE 3 ML: .5; 3 SOLUTION RESPIRATORY (INHALATION) at 11:08

## 2022-08-31 RX ADMIN — METHYLPREDNISOLONE SODIUM SUCCINATE 62.5 MG: 125 INJECTION, POWDER, FOR SOLUTION INTRAMUSCULAR; INTRAVENOUS at 08:11

## 2022-08-31 RX ADMIN — Medication 1 MG: at 00:36

## 2022-08-31 RX ADMIN — LEVOTHYROXINE SODIUM 112 MCG: 0.11 TABLET ORAL at 08:11

## 2022-08-31 RX ADMIN — LOSARTAN POTASSIUM 25 MG: 25 TABLET, FILM COATED ORAL at 08:11

## 2022-08-31 RX ADMIN — AZITHROMYCIN MONOHYDRATE 250 MG: 250 TABLET ORAL at 08:11

## 2022-08-31 RX ADMIN — PAROXETINE HYDROCHLORIDE 10 MG: 10 TABLET, FILM COATED ORAL at 08:11

## 2022-08-31 ASSESSMENT — ACTIVITIES OF DAILY LIVING (ADL)
ADLS_ACUITY_SCORE: 20

## 2022-08-31 NOTE — PROGRESS NOTES
Patient has been assessed for Home Oxygen needs.  Oxygen readings:   *   RA - at rest  Pulse oximetry SPO2 93 %  *   RA - during activity/with exercise SPO2 91%  *   O2 at 1 liters/minute (at rest) ...SPO2 95%  *   O2 at  1 liters/minute (during activity/with exercise) ...SPO2 93 %

## 2022-08-31 NOTE — PLAN OF CARE
Patient's After Visit Summary was reviewed with patient and family.   Patient verbalized understanding of After Visit Summary, recommended follow up and was given an opportunity to ask questions.  PIV removed.

## 2022-08-31 NOTE — PLAN OF CARE
[id number]    To Do:  End of Shift Summary  For vital signs and complete assessments, please see documentation flowsheets.     Pertinent assessments: Pt A&Ox4, VSS on 2L on O2 via NC. Ind. In room. BLUE LS Diminished w/ inspiratory wheezes. Duoneb given. Pt voiding adequately. Saline locked.   Major Shift Events Uneventful   Treatment Plan: wean O2 as able, encourage OOB activity, Possible discharge home tomorrow.   Bedside Nurse: Wanda Braun RN

## 2022-08-31 NOTE — PLAN OF CARE
End of Shift Summary  For vital signs and complete assessments, please see documentation flowsheets.     Pertinent assessments: Afebrile. Pt continues to need 2L oxygen in high 90s. LS clear, pt is BLUE. BS x4. Pt denies pain and nausea. Regular diet. Independently ambulating. PIV - SL. A&O. Gave melatonin to help her sleep; slept on/off.     Major Shift Events: none    Treatment Plan: Solumedrol, Zithromax, Continue to wean oxygen back to baseline, Discharge TBD.

## 2022-08-31 NOTE — DISCHARGE SUMMARY
Discharge Summary  Hospitalist Service      Lara Melendez MRN# [id number]   YOB: 1965 Age: 56 year old     Date of Admission:  8/29/2022  Date of Discharge:  8/31/2022  Admitting Physician:  Yahir Beaulieu MD  Discharge Physician: Tali Haskins MD   Discharging Service: Hospitalist Service     Primary Provider: Chyna Rust  Primary Care Physician Phone Number: [phone]         Discharge Diagnoses/Problem Oriented Hospital Course (Providers):      Discharge Diagnoses   Acute respiratory failure with hypoxia  Anxiety   HTN  HLP  Hypothyroidism   Hospital Course   Lara Melendez is a 56 year old female with PMHx significant for COPD, HTN, HLP, hypothyroidism and anxiety, who was admitted on 8/29/2022 with SOB and hypoxia. She notes a significant increase in her anxiety over the past year which does contribute to her symptoms.      1. Acute respiratory failure with hypoxia  COPD exacerbation   -Wheezing improving, passed oxygen walk test requested to go home today  -Changed to oral steroids with azithromycin today  -Remain on breathing treatments and as needed albuterol neb        2. Anxiety   Significant increase in anxiety over the past year, she has had several COPD exacerbations following an episode of mandibular abscess and presumed osteomyelitis, s/p I&D in Jan 2022. Also had Covid infection in May. Has been working with PCP to better manage anxiety. Was on Paxil, Effexor added. Now switched Paxil to Prozac but sx started per above, last dose of Prozac was on Saturday. Unclear if still taking Effexor, med rec pending.  - I believe pt plans to resume Paxil. Continue home meds once med rec completed. If not taking Effexor, consider Buspar in addition to Paxil  - hydroxyzine PRN for anxiety  - ativan PRN for anxiety  - close follow up with PCP. Consider therapy     3. HTN  Resume home meds with parameters, appears to be on losartan  4. HLP  Resume home statin  5. Hypothyroidism    Resume home meds      Covid-19 negative         Code Status:      Full Code         Important Results:                  Pending Results:        Unresulted Labs Ordered in the Past 30 Days of this Admission     No orders found from 7/30/2022 to 8/30/2022.               Discharge Instructions and Follow-Up:      Follow-up Appointments     Follow-up and recommended labs and tests       Follow up with primary care provider, Chyna Rust, within 7 days   for hospital follow- up.  No follow up labs or test are needed.                  Discharge Disposition:        Discharged to home          Discharge Medications:        Current Discharge Medication List      START taking these medications    Details   azithromycin (ZITHROMAX) 250 MG tablet Take 1 tablet (250 mg) by mouth daily  Qty: 2 tablet, Refills: 0    Associated Diagnoses: COPD with acute exacerbation (H)      hydrOXYzine (ATARAX) 25 MG tablet Take 1 tablet (25 mg) by mouth 3 times daily as needed for anxiety  Qty: 6 tablet, Refills: 0    Associated Diagnoses: Anxiety         CONTINUE these medications which have CHANGED    Details   predniSONE (DELTASONE) 20 MG tablet Take 2 tablets (40 mg) by mouth daily  Qty: 8 tablet, Refills: 0    Associated Diagnoses: COPD exacerbation (H); Acute respiratory failure with hypoxia (H)         CONTINUE these medications which have NOT CHANGED    Details   albuterol (PROAIR HFA/PROVENTIL HFA/VENTOLIN HFA) 108 (90 Base) MCG/ACT inhaler Inhale 2 puffs into the lungs every 4 hours as needed for shortness of breath / dyspnea or wheezing Inhale 1-2 Puffs every 4 hours as needed for Wheezing.  Qty: 18 g, Refills: 0    Comments: Pharmacy may dispense brand covered by insurance (Proair, or proventil or ventolin or generic albuterol inhaler)  Associated Diagnoses: Chronic obstructive pulmonary disease, unspecified COPD type (H)      albuterol (PROVENTIL) (2.5 MG/3ML) 0.083% neb solution Take 1 vial (2.5 mg) by nebulization every 4  "hours as needed for shortness of breath / dyspnea or wheezing  Qty: 30 mL, Refills: 0    Associated Diagnoses: COPD exacerbation (H)      ALPRAZolam (XANAX) 0.25 MG tablet Take 0.5 mg by mouth nightly as needed for anxiety      atorvastatin (LIPITOR) 20 MG tablet Take 20 mg by mouth daily      benzonatate (TESSALON) 100 MG capsule Take 1 capsule (100 mg) by mouth 3 times daily as needed for cough  Qty: 30 capsule, Refills: 0    Associated Diagnoses: COPD exacerbation (H); Acute respiratory failure with hypoxia (H)      fluticasone-salmeterol (ADVAIR-HFA) 230-21 MCG/ACT inhaler Inhale 2 puffs into the lungs 2 times daily  Qty: 12 g, Refills: 0    Associated Diagnoses: COPD exacerbation (H); Acute respiratory failure with hypoxia (H)      guaiFENesin (MUCINEX) 600 MG 12 hr tablet Take 1 tablet (600 mg) by mouth 2 times daily  Qty: 30 tablet, Refills: 0    Associated Diagnoses: COPD exacerbation (H); Acute respiratory failure with hypoxia (H)      levothyroxine (SYNTHROID/LEVOTHROID) 112 MCG tablet Take 112 mcg by mouth daily      losartan (COZAAR) 25 MG tablet Take 1 tablet (25 mg) by mouth daily  Qty: 30 tablet, Refills: 1    Associated Diagnoses: Essential hypertension      PARoxetine (PAXIL) 10 MG tablet Take 1 tablet by mouth daily                  Allergies:         Allergies   Allergen Reactions     Sulfa Drugs      Penicillins Rash     Generalized rash  Rash, Generalized              Consultations This Hospital Stay:        No consultations were requested during this admission          Condition and Physical Exam on Discharge:        Discharge condition: Stable   Discharge vitals: Blood pressure (!) 143/84, pulse 73, temperature 97.9  F (36.6  C), temperature source Oral, resp. rate 16, height 1.626 m (5' 4\"), weight 61.7 kg (136 lb 1.6 oz), SpO2 97 %.   General: Pt in NAD, normal appearance  HEENT: OP clear MMM, no JVD  Lungs: Clear to Auscultation Bilateral, normal breathing  without accessory muscle usage, " no wheezing, rhonchi or crackles  Cardiac: +S1, S2, RRR, no MRG, no edema  Abdominal: normal bowel sounds, NT/ND, no hepatosplenomegaly  Skin: warm, dry, normal turgor, no rash  Psyche: A& O x3, appropriate affect            Discharge Orders for Skilled Facility (from Discharge Orders):        After Care Instructions     Activity      Your activity upon discharge: activity as tolerated         Diet      Follow this diet upon discharge: Orders Placed This Encounter      Combination Diet Regular Diet Adult                    Rehab orders for Skilled Facility (from Discharge Orders):               Discharge Time:      Greater than 30 minutes.        Image Results From This Hospital Stay (For Non-EPIC Providers):        Results for orders placed or performed during the hospital encounter of 08/29/22   CT Chest Pulmonary Embolism w Contrast    Narrative    CT CHEST PULMONARY EMBOLISM WITH CONTRAST  8/29/2022 9:10 AM    CLINICAL HISTORY: Hypoxia.    TECHNIQUE: CT angiogram chest during arterial phase injection IV  contrast. 2D and 3D MIP reconstructions were performed by the CT  technologist. Dose reduction techniques were used.     CONTRAST: 52 mL Isovue-370    COMPARISON: CT chest 2/21/2022, chest x-ray 1/30/2006.    FINDINGS:  ANGIOGRAM CHEST: Pulmonary arteries are normal caliber and negative  for pulmonary emboli. Thoracic aorta is negative for dissection. No CT  evidence of right heart strain.    LUNGS AND PLEURA: Emphysema. Clusters of calcified nodules at the left  upper lobe again identified. This is also present on the remote chest  x-ray from 2006. Associated mild areas of peribronchial wall  thickening leading to the left upper lobe and small mucus impaction.  Some peribronchial wall thickening also noted at the bilateral lower  lungs. No acute airspace consolidation. No effusion. Stable nodule  right upper lobe measuring 0.3 cm, series 7 image 58.    MEDIASTINUM/AXILLAE: No suspicious enlarged lymph nodes or  acute  abnormality at the mediastinum. Stable soft tissue density at the  anterior lower mediastinum extending to the inferior xiphoid process,  likely muscular in origin.    CORONARY ARTERY CALCIFICATION: None.    UPPER ABDOMEN: Cholecystectomy. No acute upper abdominal abnormality.    MUSCULOSKELETAL: No acute abnormality.      Impression    IMPRESSION:  1.  No evidence for pulmonary embolism.  2.  Bilateral regions of peribronchial wall thickening and mucus  impaction may reflect an infectious or inflammatory bronchitis.  3.  Stable pulmonary nodule at the right upper lobe, see below for  follow-up.    Recommendations for one or multiple incidental lung nodules < 6mm:    Low risk patients: No routine follow-up.    High risk patients: Optional follow-up CT at 12 months; if  unchanged, no further follow-up.    *Low Risk: Minimal or absent history of smoking or other known risk  factors.  *Nonsolid (ground glass) or partly solid nodules may require longer  follow-up to exclude indolent adenocarcinoma.  *Recommendations based on Guidelines for the Management of Incidental  Pulmonary Nodules Detected at CT: From the Fleischner Society 2017,  Radiology 2017.    MEHUL HOWE MD         SYSTEM ID:  [id number]           Most Recent Lab Results In EPIC (For Non-EPIC Providers):    Most Recent 3 CBC's:  Recent Labs   Lab Test 08/30/22  0624 08/29/22  0635 05/17/22  0815   WBC 6.0 7.5 6.2   HGB 13.4 14.8 13.7   * 102* 99   * 171 200      Most Recent 3 BMP's:  Recent Labs   Lab Test 08/30/22  0624 08/29/22  0635 05/17/22  0815   * 138 136   POTASSIUM 4.2 4.3 4.5   CHLORIDE 100 102 103   CO2 28 23 29   BUN 7.8 8.1 17   CR 0.55 0.55 0.59   ANIONGAP 7 13 4   EDIN 9.3 9.3 8.8   * 102* 124*     Most Recent 3 Troponin's:No lab results found.  Most Recent 3 INR's:  Recent Labs   Lab Test 05/14/22  1218   INR 1.03     Most Recent 2 LFT's:  Recent Labs   Lab Test 08/29/22  0635 05/15/22  0639   AST 61* 27   ALT  55* 42   ALKPHOS 79 89   BILITOTAL 0.4 0.8     Most Recent Cholesterol Panel:No lab results found.  Most Recent 6 Bacteria Isolates From Any Culture (See EPIC Reports for Culture Details):No lab results found.  Most Recent TSH, T4 and HgbA1c:No lab results found.

## 2022-09-02 ENCOUNTER — PATIENT OUTREACH (OUTPATIENT)
Dept: CARE COORDINATION | Facility: CLINIC | Age: 57
End: 2022-09-02

## 2022-09-02 NOTE — PROGRESS NOTES
Connected Care Resource Center Contact  [facility]/Voicemail     Clinical Data: Transitional Care Management Outreach     Outreach attempted x 2.  Left message on patient's voicemail, providing [facility]'s 24/7 scheduling and nurse triage phone number [phone] ([phone]) for questions/concerns and/or to schedule an appt with an [facility] provider, if they do not have a PCP.      Plan:  [facility] will do no further outreaches at this time.       Sonia Hinojosa MA  Connected Care Resource Center, [facility]    *Connected Care Resource Team does NOT follow patient ongoing. Referrals are identified based on internal discharge reports and the outreach is to ensure patient has an understanding of their discharge instructions.

## 2022-09-26 ENCOUNTER — HOSPITAL ENCOUNTER (OUTPATIENT)
Facility: CLINIC | Age: 57
Setting detail: OBSERVATION
Discharge: HOME | End: 2022-09-28
Attending: EMERGENCY MEDICINE | Admitting: HOSPITALIST
Payer: COMMERCIAL

## 2022-09-26 ENCOUNTER — APPOINTMENT (OUTPATIENT)
Dept: CT IMAGING | Facility: CLINIC | Age: 57
End: 2022-09-26
Attending: EMERGENCY MEDICINE
Payer: COMMERCIAL

## 2022-09-26 DIAGNOSIS — R91.1 PULMONARY NODULE: ICD-10-CM

## 2022-09-26 DIAGNOSIS — J44.1 CHRONIC OBSTRUCTIVE PULMONARY DISEASE WITH ACUTE EXACERBATION (H): ICD-10-CM

## 2022-09-26 DIAGNOSIS — J44.1 COPD EXACERBATION (H): Primary | ICD-10-CM

## 2022-09-26 LAB
ANION GAP SERPL CALCULATED.3IONS-SCNC: 13 MMOL/L (ref 7–15)
BASOPHILS # BLD AUTO: 0 10E3/UL (ref 0–0.2)
BASOPHILS NFR BLD AUTO: 0 %
BUN SERPL-MCNC: 6.4 MG/DL (ref 6–20)
CALCIUM SERPL-MCNC: 9.4 MG/DL (ref 8.6–10)
CHLORIDE SERPL-SCNC: 102 MMOL/L (ref 98–107)
CREAT SERPL-MCNC: 0.58 MG/DL (ref 0.51–0.95)
D DIMER PPP FEU-MCNC: 0.63 UG/ML FEU (ref 0–0.5)
DEPRECATED HCO3 PLAS-SCNC: 23 MMOL/L (ref 22–29)
EOSINOPHIL # BLD AUTO: 0.2 10E3/UL (ref 0–0.7)
EOSINOPHIL NFR BLD AUTO: 4 %
ERYTHROCYTE [DISTWIDTH] IN BLOOD BY AUTOMATED COUNT: 12.2 % (ref 10–15)
FLUAV RNA SPEC QL NAA+PROBE: NEGATIVE
FLUBV RNA RESP QL NAA+PROBE: NEGATIVE
GFR SERPL CREATININE-BSD FRML MDRD: >90 ML/MIN/1.73M2
GLUCOSE SERPL-MCNC: 114 MG/DL (ref 70–99)
HCT VFR BLD AUTO: 39.6 % (ref 35–47)
HGB BLD-MCNC: 13.5 G/DL (ref 11.7–15.7)
HOLD SPECIMEN: NORMAL
IMM GRANULOCYTES # BLD: 0 10E3/UL
IMM GRANULOCYTES NFR BLD: 0 %
LYMPHOCYTES # BLD AUTO: 1.1 10E3/UL (ref 0.8–5.3)
LYMPHOCYTES NFR BLD AUTO: 22 %
MCH RBC QN AUTO: 34.3 PG (ref 26.5–33)
MCHC RBC AUTO-ENTMCNC: 34.1 G/DL (ref 31.5–36.5)
MCV RBC AUTO: 101 FL (ref 78–100)
MONOCYTES # BLD AUTO: 0.6 10E3/UL (ref 0–1.3)
MONOCYTES NFR BLD AUTO: 12 %
NEUTROPHILS # BLD AUTO: 3.1 10E3/UL (ref 1.6–8.3)
NEUTROPHILS NFR BLD AUTO: 62 %
NRBC # BLD AUTO: 0 10E3/UL
NRBC BLD AUTO-RTO: 0 /100
NT-PROBNP SERPL-MCNC: 250 PG/ML (ref 0–125)
PLATELET # BLD AUTO: 141 10E3/UL (ref 150–450)
POTASSIUM SERPL-SCNC: 3.4 MMOL/L (ref 3.4–5.3)
RBC # BLD AUTO: 3.94 10E6/UL (ref 3.8–5.2)
RSV RNA SPEC NAA+PROBE: NEGATIVE
SARS-COV-2 RNA RESP QL NAA+PROBE: NEGATIVE
SODIUM SERPL-SCNC: 138 MMOL/L (ref 136–145)
TROPONIN T SERPL HS-MCNC: 8 NG/L
WBC # BLD AUTO: 5 10E3/UL (ref 4–11)

## 2022-09-26 PROCEDURE — 250N000009 HC RX 250: Performed by: EMERGENCY MEDICINE

## 2022-09-26 PROCEDURE — 85025 COMPLETE CBC W/AUTO DIFF WBC: CPT | Performed by: EMERGENCY MEDICINE

## 2022-09-26 PROCEDURE — 250N000013 HC RX MED GY IP 250 OP 250 PS 637: Performed by: EMERGENCY MEDICINE

## 2022-09-26 PROCEDURE — 94640 AIRWAY INHALATION TREATMENT: CPT

## 2022-09-26 PROCEDURE — 85379 FIBRIN DEGRADATION QUANT: CPT | Performed by: EMERGENCY MEDICINE

## 2022-09-26 PROCEDURE — G0378 HOSPITAL OBSERVATION PER HR: HCPCS

## 2022-09-26 PROCEDURE — 250N000011 HC RX IP 250 OP 636: Performed by: EMERGENCY MEDICINE

## 2022-09-26 PROCEDURE — 87637 SARSCOV2&INF A&B&RSV AMP PRB: CPT | Performed by: EMERGENCY MEDICINE

## 2022-09-26 PROCEDURE — 99285 EMERGENCY DEPT VISIT HI MDM: CPT | Mod: 25

## 2022-09-26 PROCEDURE — 36415 COLL VENOUS BLD VENIPUNCTURE: CPT | Performed by: EMERGENCY MEDICINE

## 2022-09-26 PROCEDURE — 99220 PR INITIAL OBSERVATION CARE,LEVEL III: CPT | Performed by: PHYSICIAN ASSISTANT

## 2022-09-26 PROCEDURE — 71275 CT ANGIOGRAPHY CHEST: CPT

## 2022-09-26 PROCEDURE — C9803 HOPD COVID-19 SPEC COLLECT: HCPCS

## 2022-09-26 PROCEDURE — 250N000011 HC RX IP 250 OP 636: Performed by: PHYSICIAN ASSISTANT

## 2022-09-26 PROCEDURE — 96374 THER/PROPH/DIAG INJ IV PUSH: CPT | Mod: XU

## 2022-09-26 PROCEDURE — 250N000012 HC RX MED GY IP 250 OP 636 PS 637: Performed by: EMERGENCY MEDICINE

## 2022-09-26 PROCEDURE — 84484 ASSAY OF TROPONIN QUANT: CPT | Performed by: EMERGENCY MEDICINE

## 2022-09-26 PROCEDURE — 250N000013 HC RX MED GY IP 250 OP 250 PS 637: Performed by: PHYSICIAN ASSISTANT

## 2022-09-26 PROCEDURE — 83880 ASSAY OF NATRIURETIC PEPTIDE: CPT | Performed by: PHYSICIAN ASSISTANT

## 2022-09-26 PROCEDURE — 93005 ELECTROCARDIOGRAM TRACING: CPT

## 2022-09-26 PROCEDURE — 80048 BASIC METABOLIC PNL TOTAL CA: CPT | Performed by: EMERGENCY MEDICINE

## 2022-09-26 RX ORDER — VENLAFAXINE HYDROCHLORIDE 37.5 MG/1
37.5 CAPSULE, EXTENDED RELEASE ORAL DAILY PRN
COMMUNITY
End: 2023-03-26

## 2022-09-26 RX ORDER — ACETAMINOPHEN 650 MG/1
650 SUPPOSITORY RECTAL EVERY 6 HOURS PRN
Status: DISCONTINUED | OUTPATIENT
Start: 2022-09-26 | End: 2022-09-28 | Stop reason: HOSPADM

## 2022-09-26 RX ORDER — ALBUTEROL SULFATE 0.83 MG/ML
2.5 SOLUTION RESPIRATORY (INHALATION) EVERY 6 HOURS PRN
Status: DISCONTINUED | OUTPATIENT
Start: 2022-09-26 | End: 2022-09-28 | Stop reason: HOSPADM

## 2022-09-26 RX ORDER — HYDROXYZINE HYDROCHLORIDE 25 MG/1
25 TABLET, FILM COATED ORAL 3 TIMES DAILY PRN
Status: DISCONTINUED | OUTPATIENT
Start: 2022-09-26 | End: 2022-09-26

## 2022-09-26 RX ORDER — PREDNISONE 20 MG/1
60 TABLET ORAL ONCE
Status: COMPLETED | OUTPATIENT
Start: 2022-09-26 | End: 2022-09-26

## 2022-09-26 RX ORDER — ONDANSETRON 2 MG/ML
4 INJECTION INTRAMUSCULAR; INTRAVENOUS EVERY 6 HOURS PRN
Status: DISCONTINUED | OUTPATIENT
Start: 2022-09-26 | End: 2022-09-28 | Stop reason: HOSPADM

## 2022-09-26 RX ORDER — IOPAMIDOL 755 MG/ML
500 INJECTION, SOLUTION INTRAVASCULAR ONCE
Status: COMPLETED | OUTPATIENT
Start: 2022-09-26 | End: 2022-09-26

## 2022-09-26 RX ORDER — ACETAMINOPHEN 325 MG/1
650 TABLET ORAL EVERY 6 HOURS PRN
Status: DISCONTINUED | OUTPATIENT
Start: 2022-09-26 | End: 2022-09-28 | Stop reason: HOSPADM

## 2022-09-26 RX ORDER — IPRATROPIUM BROMIDE AND ALBUTEROL SULFATE 2.5; .5 MG/3ML; MG/3ML
3 SOLUTION RESPIRATORY (INHALATION)
Status: COMPLETED | OUTPATIENT
Start: 2022-09-26 | End: 2022-09-26

## 2022-09-26 RX ORDER — ALPRAZOLAM 0.5 MG
0.5 TABLET ORAL DAILY
Status: DISCONTINUED | OUTPATIENT
Start: 2022-09-27 | End: 2022-09-27

## 2022-09-26 RX ORDER — DOXYCYCLINE 100 MG/1
100 CAPSULE ORAL ONCE
Status: COMPLETED | OUTPATIENT
Start: 2022-09-26 | End: 2022-09-26

## 2022-09-26 RX ORDER — ONDANSETRON 4 MG/1
4 TABLET, ORALLY DISINTEGRATING ORAL EVERY 6 HOURS PRN
Status: DISCONTINUED | OUTPATIENT
Start: 2022-09-26 | End: 2022-09-28 | Stop reason: HOSPADM

## 2022-09-26 RX ORDER — DOXYCYCLINE 100 MG/1
100 CAPSULE ORAL DAILY
Status: DISCONTINUED | OUTPATIENT
Start: 2022-09-27 | End: 2022-09-28 | Stop reason: HOSPADM

## 2022-09-26 RX ORDER — GUAIFENESIN 600 MG/1
600 TABLET, EXTENDED RELEASE ORAL 2 TIMES DAILY
Status: DISCONTINUED | OUTPATIENT
Start: 2022-09-26 | End: 2022-09-28 | Stop reason: HOSPADM

## 2022-09-26 RX ORDER — IOPAMIDOL 755 MG/ML
500 INJECTION, SOLUTION INTRAVASCULAR ONCE
Status: DISCONTINUED | OUTPATIENT
Start: 2022-09-26 | End: 2022-09-26

## 2022-09-26 RX ORDER — GUAIFENESIN/DEXTROMETHORPHAN 100-10MG/5
10 SYRUP ORAL EVERY 4 HOURS PRN
Status: DISCONTINUED | OUTPATIENT
Start: 2022-09-26 | End: 2022-09-28 | Stop reason: HOSPADM

## 2022-09-26 RX ORDER — LOSARTAN POTASSIUM 25 MG/1
25 TABLET ORAL DAILY
Status: DISCONTINUED | OUTPATIENT
Start: 2022-09-27 | End: 2022-09-28 | Stop reason: HOSPADM

## 2022-09-26 RX ORDER — BENZONATATE 100 MG/1
100 CAPSULE ORAL 3 TIMES DAILY PRN
Status: DISCONTINUED | OUTPATIENT
Start: 2022-09-26 | End: 2022-09-28 | Stop reason: HOSPADM

## 2022-09-26 RX ORDER — IPRATROPIUM BROMIDE AND ALBUTEROL SULFATE 2.5; .5 MG/3ML; MG/3ML
3 SOLUTION RESPIRATORY (INHALATION)
Status: DISCONTINUED | OUTPATIENT
Start: 2022-09-27 | End: 2022-09-28 | Stop reason: HOSPADM

## 2022-09-26 RX ORDER — LEVOTHYROXINE SODIUM 112 UG/1
112 TABLET ORAL
Status: DISCONTINUED | OUTPATIENT
Start: 2022-09-27 | End: 2022-09-28 | Stop reason: HOSPADM

## 2022-09-26 RX ADMIN — PREDNISONE 60 MG: 20 TABLET ORAL at 15:13

## 2022-09-26 RX ADMIN — IPRATROPIUM BROMIDE AND ALBUTEROL SULFATE 3 ML: 2.5; .5 SOLUTION RESPIRATORY (INHALATION) at 15:16

## 2022-09-26 RX ADMIN — SODIUM CHLORIDE 74 ML: 9 INJECTION, SOLUTION INTRAVENOUS at 17:02

## 2022-09-26 RX ADMIN — IPRATROPIUM BROMIDE AND ALBUTEROL SULFATE 3 ML: 2.5; .5 SOLUTION RESPIRATORY (INHALATION) at 16:26

## 2022-09-26 RX ADMIN — IOPAMIDOL 48 ML: 755 INJECTION, SOLUTION INTRAVENOUS at 17:02

## 2022-09-26 RX ADMIN — FAMOTIDINE 20 MG: 10 INJECTION INTRAVENOUS at 23:54

## 2022-09-26 RX ADMIN — GUAIFENESIN 600 MG: 600 TABLET, EXTENDED RELEASE ORAL at 23:55

## 2022-09-26 RX ADMIN — IPRATROPIUM BROMIDE AND ALBUTEROL SULFATE 3 ML: 2.5; .5 SOLUTION RESPIRATORY (INHALATION) at 16:19

## 2022-09-26 RX ADMIN — DOXYCYCLINE HYCLATE 100 MG: 100 CAPSULE ORAL at 18:28

## 2022-09-26 ASSESSMENT — ENCOUNTER SYMPTOMS
ABDOMINAL PAIN: 0
SHORTNESS OF BREATH: 1
NERVOUS/ANXIOUS: 1

## 2022-09-26 ASSESSMENT — ACTIVITIES OF DAILY LIVING (ADL)
ADLS_ACUITY_SCORE: 35

## 2022-09-26 NOTE — ED PROVIDER NOTES
History   Chief Complaint:  Shortness of Breath       HPI   Lara Melendez is a 56 year old female with history of COPD, pneumonia, tobacco use disorder who presents with shortness of breath. The patient reports onset of increased shortness of breath yesterday morning. She gave herself 4 nebulizer and was using 2L of oxygen throughout the day. She was feeling better with these treatments and her anxiety was significantly less with this. This morning she woke up short of breath still and was having increased shortness of breath with exertion. She was recently admitted from 8/29-8/31 for pneumonia. She does not feel like she is bringing up any more sputum than normal or different colored sputum. She is not currently on steroids. The patient denies chest pain, abdominal pain or other concerns.     Review of Systems   Respiratory: Positive for shortness of breath.    Cardiovascular: Negative for chest pain.   Gastrointestinal: Negative for abdominal pain.   Psychiatric/Behavioral: The patient is nervous/anxious.    All other systems reviewed and are negative.    Allergies:  Sulfa Drugs  Penicillins    Medications:  Xanax  Prozac  Effexor XR   Albuterol   Advair  Paxil  Lipitor  Tessalon  Mucinex  Levothyroxine  Cozaar  Atarax    Past Medical History:     Abscess of left jaw  Hypothyroidism   Psoriasis   Tobacco use disorder  Hypertension   Anxiety   Hyperlipidemia   Mandibular abscess   COPD  Pulmonary nodule   Covid-19  Acute and chronic respiratory failure      Past Surgical History:    Cholecystectomy   I & D mandible   Tooth extraction      Social History:  The patient presents to the ED with EMS  PCP: Chyna Rust     Physical Exam     Patient Vitals for the past 24 hrs:   BP Temp Temp src Pulse Resp SpO2 Weight   09/26/22 1645 (!) 136/91 -- -- 111 -- 94 % --   09/26/22 1630 (!) 152/86 -- -- 114 -- 98 % --   09/26/22 1615 (!) 145/94 -- -- 112 -- -- --   09/26/22 1600 (!) 147/104 -- -- 100 -- 94 % --    09/26/22 1545 (!) 155/92 -- -- 107 -- 93 % --   09/26/22 1530 (!) 146/95 -- -- 112 -- 94 % --   09/26/22 1515 (!) 143/100 -- -- 110 -- 95 % --   09/26/22 1500 (!) 135/100 -- -- (!) 125 -- 95 % --   09/26/22 1440 -- -- -- (!) 121 -- 97 % --   09/26/22 1438 -- -- -- -- -- 96 % --   09/26/22 1437 (!) 136/114 -- -- (!) 135 -- 96 % --   09/26/22 1406 (!) 166/107 97.4  F (36.3  C) Temporal (!) 137 20 94 % 59 kg (130 lb)       Physical Exam  General: Patient is awake, alert and interactive when I enter the room  Head: The scalp, face, and head appear normal  Eyes: The pupils are equal, round, and reactive to light. Conjunctivae and sclerae are normal  Neck: Normal range of motion.   CV: Regular rate. S1/S2. No murmurs.   Resp: mild respiratory distress.  Inspiratory and expiratory wheezes. Prolonged expiratory phase. No rales noted. No asymmetry to breath sounds.   GI: Abdomen is soft, no rigidity, guarding, or rebound. No distension. No tenderness to palpation in any quadrant.     MS: Normal tone. Joints grossly normal without effusions. No asymmetric leg swelling, calf or thigh tenderness.    Skin: No rash or lesions noted. Normal capillary refill noted  Neuro:Speech is normal and fluent. Face is symmetric. Moving all extremities.   Psych:  Normal affect.  Appropriate interactions.    Emergency Department Course   ECG  ECG taken at 1545, ECG read at 1549  Sinus tachycardia  Otherwise normal ECG   No significant change as compared to prior, dated 8/24/22.  Rate 110 bpm. SC interval 148 ms. QRS duration 72 ms. QT/QTc 30/446 ms. P-R-T axes * 66 35.     Imaging:  CT Chest Pulmonary Embolism w Contrast   Final Result   IMPRESSION:   1.  No acute CT abnormality of the chest. Specifically, no acute pulmonary embolism.   2.  Suspected hepatic cirrhosis.   3.  Right upper lobe nodule measures 4 mm, previously 3 mm on 02/21/2022. Chest CT follow-up is recommended in 6 months, given the interval growth in size.               Report per radiology    Laboratory:  Labs Ordered and Resulted from Time of ED Arrival to Time of ED Departure   BASIC METABOLIC PANEL - Abnormal       Result Value    Sodium 138      Potassium 3.4      Chloride 102      Carbon Dioxide (CO2) 23      Anion Gap 13      Urea Nitrogen 6.4      Creatinine 0.58      Calcium 9.4      Glucose 114 (*)     GFR Estimate >90     CBC WITH PLATELETS AND DIFFERENTIAL - Abnormal    WBC Count 5.0      RBC Count 3.94      Hemoglobin 13.5      Hematocrit 39.6       (*)     MCH 34.3 (*)     MCHC 34.1      RDW 12.2      Platelet Count 141 (*)     % Neutrophils 62      % Lymphocytes 22      % Monocytes 12      % Eosinophils 4      % Basophils 0      % Immature Granulocytes 0      NRBCs per 100 WBC 0      Absolute Neutrophils 3.1      Absolute Lymphocytes 1.1      Absolute Monocytes 0.6      Absolute Eosinophils 0.2      Absolute Basophils 0.0      Absolute Immature Granulocytes 0.0      Absolute NRBCs 0.0     D DIMER QUANTITATIVE - Abnormal    D-Dimer Quantitative 0.63 (*)    TROPONIN T, HIGH SENSITIVITY - Normal    Troponin T, High Sensitivity 8     INFLUENZA A/B & SARS-COV2 PCR MULTIPLEX - Normal    Influenza A PCR Negative      Influenza B PCR Negative      RSV PCR Negative      SARS CoV2 PCR Negative        Emergency Department Course:    Reviewed:  I reviewed nursing notes, vitals, past medical history and Care Everywhere    Assessments:  1448 I obtained history and examined the patient as noted above.   1701 I rechecked the patient and explained findings.     Consults:  1804 I spoke with Sabrina MCKEON for Dr. Alva of the Hospitalist service, regarding patient's presentation, findings, and plan of care.     Interventions:  Medications   CT Scan Flush ( Intravenous Canceled Entry 9/26/22 1703)   iopamidol (ISOVUE-370) solution 500 mL ( Intravenous Canceled Entry 9/26/22 1703)   doxycycline hyclate (VIBRAMYCIN) capsule 100 mg (has no administration in time range)    ipratropium - albuterol 0.5 mg/2.5 mg/3 mL (DUONEB) neb solution 3 mL (3 mLs Nebulization Given 9/26/22 1626)   predniSONE (DELTASONE) tablet 60 mg (60 mg Oral Given 9/26/22 1513)   CT Scan Flush (74 mLs Intravenous Given 9/26/22 1702)   iopamidol (ISOVUE-370) solution 500 mL (48 mLs Intravenous Given 9/26/22 1702)     Disposition:  The patient was admitted to the hospital under the care of Dr. Alva.     Impression & Plan   Medical Decision Making:  Lara Melendez is a 56 year old female who presents for evaluation of shortness of breath and wheezing.  Signs and symptoms are consistent with COPD exacerbation.  A broad differential was considered including reactive airway disease, pneumothorax, cardiac equivalent/ACS, viral induced wheezing, allergic phenomena, pneumonia, etc.  Patient feels improved after interventions here in ED but continues to have impairment in respiratory function including tachypnea and wheezing.  Given this, I will hospitalize for further intervention. There are no signs at this point of any other serious etiologies including those mentioned above especially acute coronary syndrome. I doubt this is ACS given the classic story of COPD exacerbation given by the patient, the marked wheezing without rales. EKG shows Sinus tachycardia.  No signs of pneumonia.      Diagnosis:    ICD-10-CM    1. Chronic obstructive pulmonary disease with acute exacerbation (H)  J44.1          Scribe Disclosure:  I, Sandro Gross, am serving as a scribe at 2:45 PM on 9/26/2022 to document services personally performed by Yaniv Alexandre MD based on my observations and the provider's statements to me.            Yaniv Alexandre MD  09/26/22 [id number]

## 2022-09-26 NOTE — ED TRIAGE NOTES
A&O x4, ABCs intact. Pt presents with SOB that became worse yesterday morning. Pt states that she was in the hospital a month ago for pneumonia. Pt is on 2L oxygen at baseline.      Triage Assessment     Row Name 09/26/22 1403       Triage Assessment (Adult)    Airway WDL WDL       Respiratory WDL    Respiratory WDL WDL       Cardiac WDL    Cardiac WDL WDL

## 2022-09-26 NOTE — H&P
[facility] Hospitalist Admission Note  Name: Lara Melendez    MRN: [id number]  YOB: 1965    Age: 56 year old  Date of admission: 9/26/2022  Primary care provider: hCyna Rust      Assessment & Plan   Lara Melendez is a 56 year old female with PMHx of COPD previously on intermittent home oxygen, hypertension, hyperlipidemia, was admitted on 9/26/2022 with dyspnea.     In the ED SpO2 94% on 2LPM, RR 20, /107, .  Oxygen saturation without supplemental O2 on was not recorded in the ED.  Labs including CBC and BMP, high-sensitivity troponin unremarkable.  Viral panel negative for flu, COVID and RSV. D-dimer was mildly elevated at 0.63 prompting CT chest PE rule out study.  Chest imaging showed no evidence of pulmonary embolism, scattered areas of mucous plugging within the distal airways of the lungs with bronchiectatic change bronchial wall thickening. Remarks suspected hepatic cirrhosis. EKG showed sinus tachycardia.  I Added on NT BNP is in process.    Discussed with Dr. Alexandre in the ED, full chart review including lab work, imaging, and vital signs were reviewed. Patient received 3x duoneb, steroids in the ED. mission was requested from observation for further cares and monitoring, mild COPD exacerbation.    Acute COPD Exacerbation  Chronic Bronchitis  Former Tobacco Use history   Presented with progressive dyspnea, increased home oxygen requirements.  Afebrile.  No hemoptysis.  Has been compliant with nebulizer treatments and chronic inhalers.  She has been intermittently on home oxygen this year and does report previously following with a pulmonologist.  No fevers, actually did improve after admission in August for similar, treated with azithromycin and glucocorticoid taper to which she was only wearing oxygen nocturnally.  Despite mildly elevated NT BNP do think this is more consistent with a COPD exacerbation by her physical exam.  No history of heart failure and  echocardiogram in May without concerns.  -Admit hobs I suspect may be clinically improved for discharge in the next 24 to 48 hours  -Check SpO2 off oxygen  -supplement Oxygen PRN would not wean up past 90-92% at rest   -Scheduled DuoNebs every 4 hours with as needed albuterol  -R CAT  -Believe she would benefit from an Acapella valve  -ok complete course of therapy with doxycycline  -sputum culture if able  -GI ppx with pepcid given ongoing steroids  -Glucocorticoids: Was given 60 mg p.o. prednisone in the ED. Restart prolonged glucocorticoid taper with 60 mg prednisone in the AM.  If significantly wheezing or not clinically improved should change to IV Solu-Medrol tomorrow AM.   -antitussives PRN   -I am unsure whether she will need repeat home oxygen qualification orders.  In the past has been prescribed nocturnal oxygen but since yesterday has been wearing about 2 L/min throughout the day due to work of breathing    Pulmonary Nodule  RUL measuring 4 mm.   -Will need screening chest CT in about 6 months given interval growth in right upper lung pulmonary nodule    Liver surface nodularity noted on CT scan  Noted nodularity in the liver surface CT scan of liver.  This was not mentioned on imaging report when she had the same scan done in the end of August but her liver does look quite enlarged similarly on my review.  No recent hepatic panel for evaluation or history of cirrhosis.   -I did note this finding after evaluation with the patient and will need to be discussed.  I recommend adding LFTs, INR, and obtaining an ultrasound of her liver for screening.  This may be done as an outpatient.  -monitor platelets, hgb  -Certainly if she does have some ascites this would cause dyspnea but I am on physical exam I do not appreciate any currently    Anxiety   Significant increase in anxiety over the past year, she has had several COPD exacerbations following an episode of mandibular abscess and presumed osteomyelitis,  s/p I&D in Jan 2022. Also had Covid infection in May. Has been working with PCP to better manage anxiety.  -resume medications     HTN- Resume home meds with parameters, appears to be on losartan  HLP - Resume home statin  Hypothyroidism  - Resume home meds      DVT Prophylaxis: PCDS for tonight. Start lovenox if remaining admitted.  Code Status: Full Code as discussed with the patient on admission.     Expected Discharge Date: 09/27/2022             Esperanza Bennett PA-C    Primary Care Physician   Chyna Rust    Chief Complaint   Shortness of breath     History is obtained from the patient   Services Used: No    History of Present Illness   Lara Melendez is a 56 year old female who presents with shortness of breath.   Ms Melendez has been prescribed oxygen for home use since she had surgery, January and February of this year.  She reported that after having to neck procedures she was sent home with oxygen both times for use at night.  She was diagnosed with COPD this year as well and had been readmitted for COVID infection and pneumonia both times she discharged back home on oxygen and eventually weaned off.    Most recently she was admitted and discharged August 31 for COPD exacerbation with pneumonia she was treated with antibiotics and steroids with improved symptoms. After discharge she had been wearing oxygen only at [facility].     She started using home oxygen again yesterday (9/25) due to development of shortness of breath.  She put her self on 2 L of oxygen.  She reported some improvement after utilizing nebulizer treatments.  This morning she woke short of breath at rest and with activity such as getting up to go to the bathroom as she presented to the emergency department for evaluation.  She has not known to have fever.  She reports somewhat productive sputum that is not atypical from her baseline.  No hemoptysis.  No palpitations or chest pain.  She reports slight ankle edema that she notes  when she is on steroids.  She denies any history of liver issues or heart disease.     In the ED SpO2 94% on 2LPM, RR 20, /107, .  Vision saturation without supplemental O2 on was not recorded in the ED.  Labs including CBC and BMP, high-sensitivity troponin unremarkable.  Viral panel negative for flu, COVID and RSV. D-dimer was mildly elevated at 0.63 prompting CT chest PE rule out study.  Chest imaging showed no evidence of pulmonary embolism, scattered areas of mucous plugging within the distal airways of the lungs with bronchiectatic change bronchial wall thickening. Remarks suspected hepatic cirrhosis. EKG showed sinus tachycardia.  Added on NT BNP is in process.    Discussed with Dr. Alexandre in the ED, full chart review including lab work, imaging, and vital signs were reviewed. Patient received 3x duoneb, steroids in the ED. mission was requested from observation for further cares and monitoring, mild COPD exacerbation.    Past Medical History    I have reviewed this patient's medical history and updated it with pertinent information if needed.   Past Medical History:   Diagnosis Date     Anxiety      COPD (chronic obstructive pulmonary disease)      Hypercholesterolemia      Hypertension      Hypothyroidism        Past Surgical History   I have reviewed this patient's surgical history and updated it with pertinent information if needed.  Past Surgical History:   Procedure Laterality Date     CHOLECYSTECTOMY       INCISION AND DRAINAGE MANDIBLE, COMBINED Left 01/10/2022    Procedure: INCISION AND DRAINAGE, MANDIBLE;  Surgeon: Jn Feliz DDS;  Location: [facility] OR     INCISION AND DRAINAGE MANDIBLE, COMBINED Left 02/04/2022    Procedure: INCISION AND DRAINAGE, MANDIBLE;  Surgeon: Taylor Ortez DDS;  Location: [facility] OR     TOOTH EXTRACTION         Prior to Admission Medications   Prior to Admission Medications   Prescriptions Last Dose Informant Patient Reported? Taking?   ALPRAZolam (XANAX) 0.25  MG tablet   Yes No   Sig: Take 0.5 mg by mouth nightly as needed for anxiety   PARoxetine (PAXIL) 10 MG tablet  Self Yes No   Sig: Take 1 tablet by mouth daily   albuterol (PROAIR HFA/PROVENTIL HFA/VENTOLIN HFA) 108 (90 Base) MCG/ACT inhaler   No No   Sig: Inhale 2 puffs into the lungs every 4 hours as needed for shortness of breath / dyspnea or wheezing Inhale 1-2 Puffs every 4 hours as needed for Wheezing.   albuterol (PROVENTIL) (2.5 MG/3ML) 0.083% neb solution   No No   Sig: Take 1 vial (2.5 mg) by nebulization every 4 hours as needed for shortness of breath / dyspnea or wheezing   atorvastatin (LIPITOR) 20 MG tablet  Self Yes No   Sig: Take 20 mg by mouth daily   azithromycin (ZITHROMAX) 250 MG tablet   No No   Sig: Take 1 tablet (250 mg) by mouth daily   benzonatate (TESSALON) 100 MG capsule   No No   Sig: Take 1 capsule (100 mg) by mouth 3 times daily as needed for cough   fluticasone-salmeterol (ADVAIR-HFA) 230-21 MCG/ACT inhaler   No No   Sig: Inhale 2 puffs into the lungs 2 times daily   guaiFENesin (MUCINEX) 600 MG 12 hr tablet   No No   Sig: Take 1 tablet (600 mg) by mouth 2 times daily   hydrOXYzine (ATARAX) 25 MG tablet   No No   Sig: Take 1 tablet (25 mg) by mouth 3 times daily as needed for anxiety   levothyroxine (SYNTHROID/LEVOTHROID) 112 MCG tablet  Self Yes No   Sig: Take 112 mcg by mouth daily   losartan (COZAAR) 25 MG tablet   No No   Sig: Take 1 tablet (25 mg) by mouth daily      Facility-Administered Medications: None     Allergies   Allergies   Allergen Reactions     Sulfa Drugs      Penicillins Rash     Generalized rash  Rash, Generalized         Social History   I have reviewed this patient's social history and updated it with pertinent information if needed. Lara PEREIRA Nora reports occasional alcohol use.  She quit smoking in February 2022 with some relapse but has been using nicotine replacement patches.    Family History   I have reviewed this patient's family history and updated it  with pertinent information if needed.   No family history on file.    Review of Systems   The 10 point Review of Systems is negative other than noted in the HPI or here.     Physical Exam   Temp: 97.4  F (36.3  C) Temp src: Temporal BP: (!) 136/91 Pulse: 111   Resp: 20 SpO2: 94 % O2 Device: Nasal cannula Oxygen Delivery: 2 LPM  Vital Signs with Ranges  Temp:  [97.4  F (36.3  C)] 97.4  F (36.3  C)  Pulse:  [100-137] 111  Resp:  [20] 20  BP: (135-166)/() 136/91  SpO2:  [93 %-98 %] 94 %  130 lbs 0 oz    Constitutional: Awake, alert,  no apparent distress.  Eyes: Conjunctiva and pupils examined and normal.  HEENT: Non traumatic. Moist mucous membranes, normal dentition.  Respiratory: Tachypneic, able to speak in complete sentences.  Expiratory wheezing anteriorly on left.  Lung sounds are diminished at bases with some expiratory wheezing and end insp crackles.   Cardiovascular: Tachycardic rate, regular rhythm, normal S1 and S2, and no murmur noted.  GI: Soft, non-distended, non-tender, bowel sounds present. No rebound tenderness or guarding.  Lymph/Hematologic: No anterior cervical or supraclavicular adenopathy.  Skin: Warm, dry. No edema.  Musculoskeletal: No gross deformities noted.  No erythema or tenderness. Moving all extremities.  Neurologic: No tremor. Speech is clear. Moving all extremities with symmetrical strength. CN 2-12 grossly intact.  Coordination and sensation intact.   Psychiatric: Appropriate affect.    Data   Data reviewed today:      As noted above.   Imaging:   Recent Results (from the past 24 hour(s))   CT Chest Pulmonary Embolism w Contrast    Narrative    EXAM: CT CHEST PULMONARY EMBOLISM W CONTRAST  LOCATION: [facility]  DATE/TIME: 9/26/2022 5:10 PM    INDICATION: Shortness of breath; elevated d-dimer.  COMPARISON: 05/10/2022 and 02/21/2022.  TECHNIQUE: CT chest pulmonary angiogram during arterial phase injection of IV contrast. Multiplanar reformats and MIP  reconstructions were performed. Dose reduction techniques were used.   CONTRAST: 48mL Isovue 370    FINDINGS:    ANGIOGRAM CHEST: No acute pulmonary embolism.    Thoracic aorta is normal in course and caliber. Mild atheromatous disease.    LUNGS AND PLEURA: Scattered areas of mild linear debris and mucous plugging within the distal airways. Mild diffuse bronchiectatic change and bronchial wall thickening. Focal airspace consolidation. Unchanged chronic, calcified mucous plugging and   scarring within the left upper lobe. Mild centrilobular emphysema.     There is a 4 mm nodule within the right upper lobe, axial image 57, previously measuring 3 mm on 02/21/2022. A subpleural 2 mm nodule in the left lower lobe, axial image 242, is unchanged from prior.     No pneumothorax.     No pleural effusion.     MEDIASTINUM/AXILLAE: No mediastinal/hilar adenopathy.     Thoracic esophagus is unremarkable.      No axillary lymphadenopathy.    Chest wall is unremarkable.    Heart is normal in size.   No pericardial effusion.    CORONARY ARTERY CALCIFICATION: Mild.    UPPER ABDOMEN: Liver surface nodularity, suggestive of cirrhosis.    MUSCULOSKELETAL: No suspicious abnormality.    OTHER: No additionally suspicious abnormality.      Impression    IMPRESSION:  1.  No acute CT abnormality of the chest. Specifically, no acute pulmonary embolism.  2.  Suspected hepatic cirrhosis.  3.  Right upper lobe nodule measures 4 mm, previously 3 mm on 02/21/2022. Chest CT follow-up is recommended in 6 months, given the interval growth in size.           Recent Labs   Lab 09/26/22  1447   WBC 5.0   HGB 13.5   *   *      POTASSIUM 3.4   CHLORIDE 102   CO2 23   BUN 6.4   CR 0.58   ANIONGAP 13   EDIN 9.4   *       Esperanza Bennett PA-C on 9/26/2022 at 6:01 PM

## 2022-09-27 PROBLEM — J44.1 CHRONIC OBSTRUCTIVE PULMONARY DISEASE WITH ACUTE EXACERBATION (H): Status: ACTIVE | Noted: 2022-09-27

## 2022-09-27 LAB
ALBUMIN SERPL BCG-MCNC: 3.9 G/DL (ref 3.5–5.2)
ALP SERPL-CCNC: 84 U/L (ref 35–104)
ALT SERPL W P-5'-P-CCNC: 32 U/L (ref 10–35)
ANION GAP SERPL CALCULATED.3IONS-SCNC: 11 MMOL/L (ref 7–15)
AST SERPL W P-5'-P-CCNC: 28 U/L (ref 10–35)
ATRIAL RATE - MUSE: 110 BPM
BACTERIA SPT CULT: NORMAL
BILIRUB SERPL-MCNC: 0.5 MG/DL
BUN SERPL-MCNC: 4.7 MG/DL (ref 6–20)
CALCIUM SERPL-MCNC: 9.3 MG/DL (ref 8.6–10)
CHLORIDE SERPL-SCNC: 101 MMOL/L (ref 98–107)
CREAT SERPL-MCNC: 0.58 MG/DL (ref 0.51–0.95)
DEPRECATED HCO3 PLAS-SCNC: 25 MMOL/L (ref 22–29)
DIASTOLIC BLOOD PRESSURE - MUSE: NORMAL MMHG
ERYTHROCYTE [DISTWIDTH] IN BLOOD BY AUTOMATED COUNT: 12.5 % (ref 10–15)
GFR SERPL CREATININE-BSD FRML MDRD: >90 ML/MIN/1.73M2
GLUCOSE SERPL-MCNC: 110 MG/DL (ref 70–99)
GRAM STAIN RESULT: NORMAL
HCT VFR BLD AUTO: 39.2 % (ref 35–47)
HGB BLD-MCNC: 13.2 G/DL (ref 11.7–15.7)
INR PPP: 1.04 (ref 0.85–1.15)
INTERPRETATION ECG - MUSE: NORMAL
MCH RBC QN AUTO: 33.9 PG (ref 26.5–33)
MCHC RBC AUTO-ENTMCNC: 33.7 G/DL (ref 31.5–36.5)
MCV RBC AUTO: 101 FL (ref 78–100)
P AXIS - MUSE: NORMAL DEGREES
PLATELET # BLD AUTO: 148 10E3/UL (ref 150–450)
POTASSIUM SERPL-SCNC: 4 MMOL/L (ref 3.4–5.3)
PR INTERVAL - MUSE: 148 MS
PROT SERPL-MCNC: 6.9 G/DL (ref 6.4–8.3)
QRS DURATION - MUSE: 72 MS
QT - MUSE: 330 MS
QTC - MUSE: 446 MS
R AXIS - MUSE: 66 DEGREES
RBC # BLD AUTO: 3.89 10E6/UL (ref 3.8–5.2)
SODIUM SERPL-SCNC: 137 MMOL/L (ref 136–145)
SYSTOLIC BLOOD PRESSURE - MUSE: NORMAL MMHG
T AXIS - MUSE: 35 DEGREES
VENTRICULAR RATE- MUSE: 110 BPM
WBC # BLD AUTO: 3.5 10E3/UL (ref 4–11)

## 2022-09-27 PROCEDURE — 96375 TX/PRO/DX INJ NEW DRUG ADDON: CPT

## 2022-09-27 PROCEDURE — 94640 AIRWAY INHALATION TREATMENT: CPT

## 2022-09-27 PROCEDURE — 999N000157 HC STATISTIC RCP TIME EA 10 MIN

## 2022-09-27 PROCEDURE — 94640 AIRWAY INHALATION TREATMENT: CPT | Mod: 76

## 2022-09-27 PROCEDURE — 82374 ASSAY BLOOD CARBON DIOXIDE: CPT | Performed by: PHYSICIAN ASSISTANT

## 2022-09-27 PROCEDURE — 96372 THER/PROPH/DIAG INJ SC/IM: CPT | Performed by: HOSPITALIST

## 2022-09-27 PROCEDURE — 85027 COMPLETE CBC AUTOMATED: CPT | Performed by: PHYSICIAN ASSISTANT

## 2022-09-27 PROCEDURE — 250N000011 HC RX IP 250 OP 636: Performed by: PHYSICIAN ASSISTANT

## 2022-09-27 PROCEDURE — 36415 COLL VENOUS BLD VENIPUNCTURE: CPT | Performed by: PHYSICIAN ASSISTANT

## 2022-09-27 PROCEDURE — 96376 TX/PRO/DX INJ SAME DRUG ADON: CPT

## 2022-09-27 PROCEDURE — 87205 SMEAR GRAM STAIN: CPT | Performed by: PHYSICIAN ASSISTANT

## 2022-09-27 PROCEDURE — 99226 PR SUBSEQUENT OBSERVATION CARE,LEVEL III: CPT | Performed by: HOSPITALIST

## 2022-09-27 PROCEDURE — 250N000011 HC RX IP 250 OP 636: Performed by: HOSPITALIST

## 2022-09-27 PROCEDURE — 250N000013 HC RX MED GY IP 250 OP 250 PS 637: Performed by: PHYSICIAN ASSISTANT

## 2022-09-27 PROCEDURE — 85610 PROTHROMBIN TIME: CPT | Performed by: PHYSICIAN ASSISTANT

## 2022-09-27 PROCEDURE — G0378 HOSPITAL OBSERVATION PER HR: HCPCS

## 2022-09-27 PROCEDURE — 250N000009 HC RX 250: Performed by: PHYSICIAN ASSISTANT

## 2022-09-27 PROCEDURE — 250N000013 HC RX MED GY IP 250 OP 250 PS 637: Performed by: HOSPITALIST

## 2022-09-27 RX ORDER — PAROXETINE 20 MG/1
20 TABLET, FILM COATED ORAL DAILY
Status: DISCONTINUED | OUTPATIENT
Start: 2022-09-27 | End: 2022-09-28 | Stop reason: HOSPADM

## 2022-09-27 RX ORDER — VENLAFAXINE HYDROCHLORIDE 37.5 MG/1
37.5 TABLET, EXTENDED RELEASE ORAL DAILY
Status: DISCONTINUED | OUTPATIENT
Start: 2022-09-27 | End: 2022-09-27 | Stop reason: RX

## 2022-09-27 RX ORDER — ALPRAZOLAM 0.5 MG
0.5 TABLET ORAL 3 TIMES DAILY PRN
Status: DISCONTINUED | OUTPATIENT
Start: 2022-09-27 | End: 2022-09-28 | Stop reason: HOSPADM

## 2022-09-27 RX ORDER — VENLAFAXINE HYDROCHLORIDE 37.5 MG/1
37.5 CAPSULE, EXTENDED RELEASE ORAL DAILY
Status: DISCONTINUED | OUTPATIENT
Start: 2022-09-27 | End: 2022-09-28 | Stop reason: HOSPADM

## 2022-09-27 RX ORDER — ENOXAPARIN SODIUM 100 MG/ML
40 INJECTION SUBCUTANEOUS EVERY 24 HOURS
Status: DISCONTINUED | OUTPATIENT
Start: 2022-09-27 | End: 2022-09-28 | Stop reason: HOSPADM

## 2022-09-27 RX ORDER — METHYLPREDNISOLONE SODIUM SUCCINATE 125 MG/2ML
60 INJECTION, POWDER, LYOPHILIZED, FOR SOLUTION INTRAMUSCULAR; INTRAVENOUS EVERY 12 HOURS
Status: DISCONTINUED | OUTPATIENT
Start: 2022-09-27 | End: 2022-09-28 | Stop reason: HOSPADM

## 2022-09-27 RX ADMIN — LEVOTHYROXINE SODIUM 112 MCG: 0.11 TABLET ORAL at 09:08

## 2022-09-27 RX ADMIN — IPRATROPIUM BROMIDE AND ALBUTEROL SULFATE 3 ML: 2.5; .5 SOLUTION RESPIRATORY (INHALATION) at 07:27

## 2022-09-27 RX ADMIN — ALBUTEROL SULFATE 2.5 MG: 2.5 SOLUTION RESPIRATORY (INHALATION) at 12:12

## 2022-09-27 RX ADMIN — ENOXAPARIN SODIUM 40 MG: 40 INJECTION SUBCUTANEOUS at 10:38

## 2022-09-27 RX ADMIN — GUAIFENESIN 600 MG: 600 TABLET, EXTENDED RELEASE ORAL at 20:36

## 2022-09-27 RX ADMIN — LOSARTAN POTASSIUM 25 MG: 25 TABLET, FILM COATED ORAL at 09:07

## 2022-09-27 RX ADMIN — ALBUTEROL SULFATE 2.5 MG: 2.5 SOLUTION RESPIRATORY (INHALATION) at 05:59

## 2022-09-27 RX ADMIN — ALPRAZOLAM 0.5 MG: 0.5 TABLET ORAL at 09:06

## 2022-09-27 RX ADMIN — IPRATROPIUM BROMIDE AND ALBUTEROL SULFATE 3 ML: 2.5; .5 SOLUTION RESPIRATORY (INHALATION) at 20:19

## 2022-09-27 RX ADMIN — IPRATROPIUM BROMIDE AND ALBUTEROL SULFATE 3 ML: 2.5; .5 SOLUTION RESPIRATORY (INHALATION) at 15:39

## 2022-09-27 RX ADMIN — FAMOTIDINE 20 MG: 10 INJECTION INTRAVENOUS at 10:32

## 2022-09-27 RX ADMIN — METHYLPREDNISOLONE 62.5 MG: 125 INJECTION, POWDER, LYOPHILIZED, FOR SOLUTION INTRAMUSCULAR; INTRAVENOUS at 10:32

## 2022-09-27 RX ADMIN — DOXYCYCLINE HYCLATE 100 MG: 100 CAPSULE ORAL at 09:07

## 2022-09-27 RX ADMIN — METHYLPREDNISOLONE 62.5 MG: 125 INJECTION, POWDER, LYOPHILIZED, FOR SOLUTION INTRAMUSCULAR; INTRAVENOUS at 20:37

## 2022-09-27 RX ADMIN — VENLAFAXINE HYDROCHLORIDE 37.5 MG: 37.5 CAPSULE, EXTENDED RELEASE ORAL at 18:34

## 2022-09-27 RX ADMIN — IPRATROPIUM BROMIDE AND ALBUTEROL SULFATE 3 ML: 2.5; .5 SOLUTION RESPIRATORY (INHALATION) at 11:16

## 2022-09-27 RX ADMIN — PAROXETINE HYDROCHLORIDE 20 MG: 20 TABLET, FILM COATED ORAL at 10:33

## 2022-09-27 RX ADMIN — GUAIFENESIN 600 MG: 600 TABLET, EXTENDED RELEASE ORAL at 09:08

## 2022-09-27 ASSESSMENT — ACTIVITIES OF DAILY LIVING (ADL)
ADLS_ACUITY_SCORE: 35
ADLS_ACUITY_SCORE: 33
ADLS_ACUITY_SCORE: 35
ADLS_ACUITY_SCORE: 31
ADLS_ACUITY_SCORE: 33
ADLS_ACUITY_SCORE: 35
ADLS_ACUITY_SCORE: 31
ADLS_ACUITY_SCORE: 35
ADLS_ACUITY_SCORE: 31
ADLS_ACUITY_SCORE: 33

## 2022-09-27 NOTE — PROGRESS NOTES
[facility]    Medicine Progress Note - Hospitalist Service    Date of Admission:  9/26/2022    Assessment & Plan          Lara Melendez is a 56 year old female with PMHx of COPD previously on intermittent home oxygen, hypertension, hyperlipidemia, was admitted on 9/26/2022 with dyspnea due to COPD exacerbation.    COPD exacerbation    - patient feels about the same as yesterday    - still requiring 2L O2    - still has exp wheezing on exam    - discontinue prednisone, start IV solumedrol 60mg IV bid    - cont scheduled and PRN nebs    - continue to attempt to wean O2    - needs outpt Pulm follow-up    - still smoking 1ppd, nicotine patch ordered    - cont doxy that was started on admission 5-7 days    Anxiety    - patient c/o a lot of anxiety here in the hospital    - uses daily xanax at home, will increase to TID PRN    HTN    - cont losartan    HLP    - ok to hold home statin while here    Depression/anxiety    - cont home paroxetine and venlafaxine    Hypothyroidism    - cont levothyroxine    Daily drinker    - states 2 beers/day    - no history of withdrawal    Admit to inpatient for IV steroids, ongoing hypoxia/need for oxygen     Diet: Regular Diet Adult    DVT Prophylaxis: Enoxaparin (Lovenox) SQ  Hines Catheter: Not present  Central Lines: None  Cardiac Monitoring: None  Code Status: Full Code      Disposition Plan      Expected Discharge Date: 09/28/2022    Discharge Delays: Oxygen Needs - Arrange Home O2            The patient's care was discussed with the Bedside Nurse and Patient.    Morales Alva MD  Hospitalist Service  [facility]  Securely message with the Vocera Web Console (learn more here)  Text page via [employer] Paging/Directory     Clinically Significant Risk Factors Present on Admission                 # Hypertension: home medication list includes antihypertensive(s)         ______________________________________________________________________    Interval History   Patient in bed. Had sob when up to the bathroom this am. Feels anxiety as well. No new complaints. Eating.     Data reviewed today: I reviewed all medications, new labs and imaging results over the last 24 hours. I personally reviewed the chest CT image(s) showing as described below.    Physical Exam   Vital Signs: Temp: 98.1  F (36.7  C) Temp src: Oral BP: (!) 147/85 Pulse: 87   Resp: 20 SpO2: 96 % O2 Device: None (Room air) Oxygen Delivery: 2 LPM  Weight: 130 lbs 0 oz  Constitutional: awake, alert, cooperative, no apparent distress, and appears stated age  Eyes: Lids and lashes normal, pupils equal, round and reactive to light, extra ocular muscles intact, sclera clear, conjunctiva normal  ENT: Normocephalic, without obvious abnormality, atraumatic, sinuses nontender on palpation, external ears without lesions, oral pharynx with moist mucous membranes, tonsils without erythema or exudates, gums normal and good dentition.  Respiratory: moderate air movement. Bilateral expiratory wheezing  Cardiovascular: Normal apical impulse, regular rate and rhythm, normal S1 and S2, no S3 or S4, and no murmur noted  GI: No scars, normal bowel sounds, soft, non-distended, non-tender, no masses palpated, no hepatosplenomegally  Skin: no bruising or bleeding  Musculoskeletal: no lower extremity pitting edema present    Data   Recent Labs   Lab 09/27/22  0651 09/26/22  1447   WBC 3.5* 5.0   HGB 13.2 13.5   * 101*   * 141*   INR 1.04  --     138   POTASSIUM 4.0 3.4   CHLORIDE 101 102   CO2 25 23   BUN 4.7* 6.4   CR 0.58 0.58   ANIONGAP 11 13   EDIN 9.3 9.4   * 114*   ALBUMIN 3.9  --    PROTTOTAL 6.9  --    BILITOTAL 0.5  --    ALKPHOS 84  --    ALT 32  --    AST 28  --      Recent Results (from the past 24 hour(s))   CT Chest Pulmonary Embolism w Contrast    Narrative    EXAM: CT CHEST PULMONARY EMBOLISM W  CONTRAST  LOCATION: [facility]  DATE/TIME: 9/26/2022 5:10 PM    INDICATION: Shortness of breath; elevated d-dimer.  COMPARISON: 05/10/2022 and 02/21/2022.  TECHNIQUE: CT chest pulmonary angiogram during arterial phase injection of IV contrast. Multiplanar reformats and MIP reconstructions were performed. Dose reduction techniques were used.   CONTRAST: 48mL Isovue 370    FINDINGS:    ANGIOGRAM CHEST: No acute pulmonary embolism.    Thoracic aorta is normal in course and caliber. Mild atheromatous disease.    LUNGS AND PLEURA: Scattered areas of mild linear debris and mucous plugging within the distal airways. Mild diffuse bronchiectatic change and bronchial wall thickening. Focal airspace consolidation. Unchanged chronic, calcified mucous plugging and   scarring within the left upper lobe. Mild centrilobular emphysema.     There is a 4 mm nodule within the right upper lobe, axial image 57, previously measuring 3 mm on 02/21/2022. A subpleural 2 mm nodule in the left lower lobe, axial image 242, is unchanged from prior.     No pneumothorax.     No pleural effusion.     MEDIASTINUM/AXILLAE: No mediastinal/hilar adenopathy.     Thoracic esophagus is unremarkable.      No axillary lymphadenopathy.    Chest wall is unremarkable.    Heart is normal in size.   No pericardial effusion.    CORONARY ARTERY CALCIFICATION: Mild.    UPPER ABDOMEN: Liver surface nodularity, suggestive of cirrhosis.    MUSCULOSKELETAL: No suspicious abnormality.    OTHER: No additionally suspicious abnormality.      Impression    IMPRESSION:  1.  No acute CT abnormality of the chest. Specifically, no acute pulmonary embolism.  2.  Suspected hepatic cirrhosis.  3.  Right upper lobe nodule measures 4 mm, previously 3 mm on 02/21/2022. Chest CT follow-up is recommended in 6 months, given the interval growth in size.

## 2022-09-27 NOTE — PLAN OF CARE
PRIMARY DIAGNOSIS: COPD exacerbation  OUTPATIENT/OBSERVATION GOALS TO BE MET BEFORE DISCHARGE:  1. Dyspnea improved and O2 sats >88% on RA or back to baseline O2 levels: Yes when at rest. Becomes dyspneic with exertion.   SpO2: 95 %, O2 Device: 2L Nasal cannula    2. Vitals signs normal or return to baseline: Yes    3. Short term supplemental O2 needed with activity at home: Yes    4. Tolerate oral intake to maintain hydration: Yes    5. Return to near baseline physical activity: Yes    Discharge Planner Nurse   Safe discharge environment identified: Yes  Barriers to discharge: Yes       Entered by: Mariann Chavez RN 09/27/2022 4:41 PM      Pt A&Ox4. Denies SOB while at rest. Becomes dyspneic with exertion and has increased WOB. On 2L NC, which is pt's baseline. Connor and PRN duonebs. Acapella device in room and pt demonstrating use. Plan for IV solumedrol BID. Independent in room. Regular diet. Denies pain, nausea, or chest pain.   Please review provider order for any additional goals.   Nurse to notify provider when observation goals have been met and patient is ready for discharge.

## 2022-09-27 NOTE — UTILIZATION REVIEW
"  Admission Status; Secondary Review Determination         Under the authority of the Utilization Management Committee, the utilization review process indicated a secondary review on the above patient.  The review outcome is based on review of the medical records, discussions with staff, and applying clinical experience noted on the date of the review.          (x) Observation Status Appropriate - This patient does not meet hospital inpatient criteria and is placed in observation status. If this patient's primary payer is Medicare and was admitted as an inpatient, Condition Code 44 should be used and patient status changed to \"observation\".     RATIONALE FOR DETERMINATION   56 year old female with PMHx of COPD previously on intermittent home oxygen, hypertension, hyperlipidemia, was admitted on 9/26/2022 with dyspnea due to COPD exacerbation.   Patient remains on 2 L/min of oxygen however there is no documented hypoxia or other indication for prolonged inpatient hospital stay.    The severity of illness, intensity of service provided, expected LOS and risk for adverse outcome make the care appropriate for further observation; however, doesn't meet criteria for hospital inpatient admission. Dr Alva notified of this determination.    This document was produced using voice recognition software.      The information on this document is developed by the utilization review team in order for the business office to ensure compliance.  This only denotes the appropriateness of proper admission status and does not reflect the quality of care rendered.         The definitions of Inpatient Status and Observation Status used in making the determination above are those provided in the CMS Coverage Manual, Chapter 1 and Chapter 6, section 70.4.      Sincerely,     PIERRE COELHO MD    System Medical Director  Utilization Management  [facility].        "

## 2022-09-27 NOTE — PLAN OF CARE
PRIMARY DIAGNOSIS: COPD exaccerbation  OUTPATIENT/OBSERVATION GOALS TO BE MET BEFORE DISCHARGE:  1. Vital signs stable: Yes    3. Dyspnea improved and O2 sats >88% at RA or at prior home O2 therapy level: No- still dyspneic on exertion and slightly at rest as well.       SpO2: 96 %, O2 Device: 2L O2 NC- baseline    4. Short term supplemental O2 needed for use with activity at home: Yes    5. Tolerating adequate PO diet and medications: Yes    6. Return to near baseline physical activity: Yes    Discharge Planner Nurse   Safe discharge environment identified: Yes  Barriers to discharge: Yes       Entered by: Veronica Partida RN 09/27/2022      Please review provider order for any additional goals.   Nurse to notify provider when observation goals have been met and patient is ready for discharge.    AOx4. Reports SOB- tachypneic at rest in bed- O2 sats stable on 2L O2 NC (baseline O2 at home). LS wheezes inspiratory and expiratory-RT following and administering marybeth nebs. Acapella device given to pt and pt demonstrated use. Infrequent dry cough noted. Plan for continued nebs, doxy, and steroids changed from PO to IV.

## 2022-09-27 NOTE — ED NOTES
[facility]  ED Nurse Handoff Report    Lara Melendez is a 56 year old female   ED Chief complaint: Shortness of Breath  . ED Diagnosis:   Final diagnoses:   Chronic obstructive pulmonary disease with acute exacerbation (H)     Allergies:   Allergies   Allergen Reactions     Sulfa Drugs      Penicillins Rash     Generalized rash  Rash, Generalized         Code Status: Full Code  Activity level - Baseline/Home:  Independent. Activity Level - Current:   Stand by Assist. Lift room needed: No. Bariatric: No   Needed: No   Isolation: No. Infection: Not Applicable.     Vital Signs:   Vitals:    09/26/22 1830 09/26/22 1845 09/26/22 1900 09/26/22 1915   BP:       Pulse:       Resp:       Temp:       TempSrc:       SpO2: 91% 95% 95% 94%   Weight:           Cardiac Rhythm:  ,      Pain level:    Patient confused: No. Patient Falls Risk: No.   Elimination Status: Has voided   Patient Report - Initial Complaint: Shortness of breath. Focused Assessment: Lara Melendez is a 56 year old female with history of COPD, pneumonia, tobacco use disorder who presents with shortness of breath. The patient reports onset of increased shortness of breath yesterday morning. She gave herself 4 nebulizer and was using 2L of oxygen throughout the day. She was feeling better with these treatments and her anxiety was significantly less with this. This morning she woke up short of breath still and was having increased shortness of breath with exertion. She was recently admitted from 8/29-8/31 for pneumonia. She does not feel like she is bringing up any more sputum than normal or different colored sputum. She is not currently on steroids. The patient denies chest pain, abdominal pain or other concerns.   Tests Performed:   Labs Ordered and Resulted from Time of ED Arrival to Time of ED Departure   BASIC METABOLIC PANEL - Abnormal       Result Value    Sodium 138      Potassium 3.4      Chloride 102      Carbon Dioxide (CO2)  23      Anion Gap 13      Urea Nitrogen 6.4      Creatinine 0.58      Calcium 9.4      Glucose 114 (*)     GFR Estimate >90     CBC WITH PLATELETS AND DIFFERENTIAL - Abnormal    WBC Count 5.0      RBC Count 3.94      Hemoglobin 13.5      Hematocrit 39.6       (*)     MCH 34.3 (*)     MCHC 34.1      RDW 12.2      Platelet Count 141 (*)     % Neutrophils 62      % Lymphocytes 22      % Monocytes 12      % Eosinophils 4      % Basophils 0      % Immature Granulocytes 0      NRBCs per 100 WBC 0      Absolute Neutrophils 3.1      Absolute Lymphocytes 1.1      Absolute Monocytes 0.6      Absolute Eosinophils 0.2      Absolute Basophils 0.0      Absolute Immature Granulocytes 0.0      Absolute NRBCs 0.0     D DIMER QUANTITATIVE - Abnormal    D-Dimer Quantitative 0.63 (*)    TROPONIN T, HIGH SENSITIVITY - Normal    Troponin T, High Sensitivity 8     INFLUENZA A/B & SARS-COV2 PCR MULTIPLEX - Normal    Influenza A PCR Negative      Influenza B PCR Negative      RSV PCR Negative      SARS CoV2 PCR Negative     N TERMINAL PRO BNP OUTPATIENT     CT Chest Pulmonary Embolism w Contrast   Final Result   IMPRESSION:   1.  No acute CT abnormality of the chest. Specifically, no acute pulmonary embolism.   2.  Suspected hepatic cirrhosis.   3.  Right upper lobe nodule measures 4 mm, previously 3 mm on 02/21/2022. Chest CT follow-up is recommended in 6 months, given the interval growth in size.              Abnormal Results:   Abnormal Labs Resulted from Time of ED Arrival to Time of ED Departure   BASIC METABOLIC PANEL - Abnormal       Result Value    Sodium 138      Potassium 3.4      Chloride 102      Carbon Dioxide (CO2) 23      Anion Gap 13      Urea Nitrogen 6.4      Creatinine 0.58      Calcium 9.4      Glucose 114 (*)     GFR Estimate >90     CBC WITH PLATELETS AND DIFFERENTIAL - Abnormal    WBC Count 5.0      RBC Count 3.94      Hemoglobin 13.5      Hematocrit 39.6       (*)     MCH 34.3 (*)     MCHC 34.1      RDW  12.2      Platelet Count 141 (*)     % Neutrophils 62      % Lymphocytes 22      % Monocytes 12      % Eosinophils 4      % Basophils 0      % Immature Granulocytes 0      NRBCs per 100 WBC 0      Absolute Neutrophils 3.1      Absolute Lymphocytes 1.1      Absolute Monocytes 0.6      Absolute Eosinophils 0.2      Absolute Basophils 0.0      Absolute Immature Granulocytes 0.0      Absolute NRBCs 0.0     D DIMER QUANTITATIVE - Abnormal    D-Dimer Quantitative 0.63 (*)      Treatments provided: imaging, labs, antibiotics  Family Comments: none  OBS brochure/video discussed/provided to patient:  No  ED Medications:   Medications   CT Scan Flush ( Intravenous Canceled Entry 9/26/22 1703)   iopamidol (ISOVUE-370) solution 500 mL ( Intravenous Canceled Entry 9/26/22 1703)   ipratropium - albuterol 0.5 mg/2.5 mg/3 mL (DUONEB) neb solution 3 mL (3 mLs Nebulization Given 9/26/22 1626)   predniSONE (DELTASONE) tablet 60 mg (60 mg Oral Given 9/26/22 1513)   CT Scan Flush (74 mLs Intravenous Given 9/26/22 1702)   iopamidol (ISOVUE-370) solution 500 mL (48 mLs Intravenous Given 9/26/22 1702)   doxycycline hyclate (VIBRAMYCIN) capsule 100 mg (100 mg Oral Given 9/26/22 1828)     Drips infusing:  No  For the majority of the shift, the patient's behavior Green. Interventions performed were update on plan of care.    Sepsis treatment initiated: No     Patient tested for COVID 19 prior to admission: YES    ED Nurse Name/Phone Number: Yojana Russell RN,   7:35 PM    RECEIVING UNIT ED HANDOFF REVIEW    Above ED Nurse Handoff Report was reviewed: Yes  Reviewed by: Rosio Wade RN on September 26, 2022 at 10:53 PM

## 2022-09-27 NOTE — PHARMACY-ADMISSION MEDICATION HISTORY
Admission medication history interview status for this patient is complete. See [facility] admission navigator for allergy information, prior to admission medications and immunization status.     Medication history interview done, indicate source(s): Patient  Medication history resources (including written lists, pill bottles, clinic record):[employer]etha[employer]digest  Pharmacy: [facility] aleksandr [facility]    Changes made to PTA medication list:  Added: venlafaxine  Changed: paroxetine to 20 mg, Xanax to daily  Reported as Not Taking: venlafaxine  Removed: Zithromax, hydroxyzine, Tessalon    Actions taken by pharmacist (provider contacted, etc):None     Additional medication history information:pt has Rx for venlafaxine but is taking daily Xanax instead    Medication reconciliation/reorder completed by provider prior to medication history?  No   (Y/N)         Prior to Admission medications    Medication Sig Last Dose Taking? Auth Provider Long Term End Date   albuterol (PROAIR HFA/PROVENTIL HFA/VENTOLIN HFA) 108 (90 Base) MCG/ACT inhaler Inhale 2 puffs into the lungs every 4 hours as needed for shortness of breath / dyspnea or wheezing Inhale 1-2 Puffs every 4 hours as needed for Wheezing. 9/26/2022 at Unknown time Yes Mark Olsen, DO Yes    albuterol (PROVENTIL) (2.5 MG/3ML) 0.083% neb solution Take 1 vial (2.5 mg) by nebulization every 4 hours as needed for shortness of breath / dyspnea or wheezing 9/26/2022 at Unknown time Yes Mark Olsen, DO Yes    ALPRAZolam (XANAX) 0.25 MG tablet Take 0.5 mg by mouth daily 9/26/2022 at am Yes Unknown, Entered By History     atorvastatin (LIPITOR) 20 MG tablet Take 20 mg by mouth daily 9/26/2022 at am Yes Reported, Patient Yes    fluticasone-salmeterol (ADVAIR-HFA) 230-21 MCG/ACT inhaler Inhale 2 puffs into the lungs 2 times daily 9/26/2022 at am Yes Mark Olsen, DO Yes    guaiFENesin (MUCINEX) 600 MG 12 hr tablet Take 1 tablet (600 mg) by mouth 2 times daily 9/26/2022 at am Yes Pretty  Mark, DO     levothyroxine (SYNTHROID/LEVOTHROID) 112 MCG tablet Take 112 mcg by mouth daily 9/26/2022 at am Yes Reported, Patient Yes    losartan (COZAAR) 25 MG tablet Take 1 tablet (25 mg) by mouth daily 9/26/2022 at am Yes Kody Prieto, DO Yes    PARoxetine (PAXIL) 10 MG tablet Take 20 mg by mouth daily 9/26/2022 at am Yes Reported, Patient Yes    venlafaxine (EFFEXOR-ER) 37.5 MG 24 hr tablet Take 37.5 mg by mouth daily not taking  Unknown, Entered By History Yes

## 2022-09-27 NOTE — PROGRESS NOTES
ROOM # 227    Living Situation (if not independent, order SW consult): Home with spouse  Facility name:  :  [facility]    Activity level at baseline: Ind  Activity level on admit: Ind    Who will be transporting you at discharge:  [facility]    Patient registered to observation; given Patient Bill of Rights; given the opportunity to ask questions about observation status and their plan of care.  Patient has been oriented to the observation room, bathroom and call light is in place.    Discussed discharge goals and expectations with patient/family.

## 2022-09-27 NOTE — PLAN OF CARE
PRIMARY DIAGNOSIS: COPD exacerbation  OUTPATIENT/OBSERVATION GOALS TO BE MET BEFORE DISCHARGE:  1. Vital signs stable: Yes    2. Dyspnea improved and O2 sats >88% at RA or at prior home O2 therapy level: Yes      SpO2: 98 %, O2 Device: Nasal cannula    3. Short term supplemental O2 needed for use with activity at home: Yes    4. Tolerating adequate PO diet and medications: Yes    5. Return to near baseline physical activity: Yes    Discharge Planner Nurse   Safe discharge environment identified: Yes  Barriers to discharge: Yes       Entered by: Rosio Wade RN 09/27/2022   Pt AO x4. VSS on 2-3L O2 per baseline except BP which pt attributed to being anxious. Expiratory wheezing noted. BS active. Pt noted feeling anxious, PRN neb given for SOB. Up independently. PIV SL. Tolerating regular diet. Pt denies pain. Will continue to monitor.   BP (!) 176/108 (BP Location: Right arm)   Pulse 103   Temp 97.9  F (36.6  C) (Oral)   Resp 20   Wt 59 kg (130 lb)   SpO2 98%   BMI 22.31 kg/m    Please review provider order for any additional goals.   Nurse to notify provider when observation goals have been met and patient is ready for discharge.

## 2022-09-28 VITALS
SYSTOLIC BLOOD PRESSURE: 149 MMHG | WEIGHT: 139.1 LBS | DIASTOLIC BLOOD PRESSURE: 88 MMHG | HEART RATE: 72 BPM | TEMPERATURE: 97.8 F | OXYGEN SATURATION: 95 % | RESPIRATION RATE: 18 BRPM | BODY MASS INDEX: 23.88 KG/M2

## 2022-09-28 LAB
BASOPHILS # BLD AUTO: 0 10E3/UL (ref 0–0.2)
BASOPHILS NFR BLD AUTO: 0 %
EOSINOPHIL # BLD AUTO: 0 10E3/UL (ref 0–0.7)
EOSINOPHIL NFR BLD AUTO: 0 %
ERYTHROCYTE [DISTWIDTH] IN BLOOD BY AUTOMATED COUNT: 12.4 % (ref 10–15)
HCT VFR BLD AUTO: 38.5 % (ref 35–47)
HGB BLD-MCNC: 12.8 G/DL (ref 11.7–15.7)
IMM GRANULOCYTES # BLD: 0 10E3/UL
IMM GRANULOCYTES NFR BLD: 0 %
LYMPHOCYTES # BLD AUTO: 0.4 10E3/UL (ref 0.8–5.3)
LYMPHOCYTES NFR BLD AUTO: 10 %
MCH RBC QN AUTO: 33.7 PG (ref 26.5–33)
MCHC RBC AUTO-ENTMCNC: 33.2 G/DL (ref 31.5–36.5)
MCV RBC AUTO: 101 FL (ref 78–100)
MONOCYTES # BLD AUTO: 0.2 10E3/UL (ref 0–1.3)
MONOCYTES NFR BLD AUTO: 5 %
NEUTROPHILS # BLD AUTO: 3.9 10E3/UL (ref 1.6–8.3)
NEUTROPHILS NFR BLD AUTO: 85 %
NRBC # BLD AUTO: 0 10E3/UL
NRBC BLD AUTO-RTO: 0 /100
PLATELET # BLD AUTO: 135 10E3/UL (ref 150–450)
RBC # BLD AUTO: 3.8 10E6/UL (ref 3.8–5.2)
WBC # BLD AUTO: 4.6 10E3/UL (ref 4–11)

## 2022-09-28 PROCEDURE — 250N000013 HC RX MED GY IP 250 OP 250 PS 637: Performed by: PHYSICIAN ASSISTANT

## 2022-09-28 PROCEDURE — 250N000009 HC RX 250: Performed by: PHYSICIAN ASSISTANT

## 2022-09-28 PROCEDURE — 99217 PR OBSERVATION CARE DISCHARGE: CPT | Performed by: HOSPITALIST

## 2022-09-28 PROCEDURE — 36415 COLL VENOUS BLD VENIPUNCTURE: CPT | Performed by: HOSPITALIST

## 2022-09-28 PROCEDURE — 85025 COMPLETE CBC W/AUTO DIFF WBC: CPT | Performed by: HOSPITALIST

## 2022-09-28 PROCEDURE — G0378 HOSPITAL OBSERVATION PER HR: HCPCS | Mod: CS

## 2022-09-28 PROCEDURE — 96372 THER/PROPH/DIAG INJ SC/IM: CPT | Performed by: HOSPITALIST

## 2022-09-28 PROCEDURE — 250N000011 HC RX IP 250 OP 636: Performed by: PHYSICIAN ASSISTANT

## 2022-09-28 PROCEDURE — 999N000157 HC STATISTIC RCP TIME EA 10 MIN

## 2022-09-28 PROCEDURE — 250N000013 HC RX MED GY IP 250 OP 250 PS 637: Performed by: HOSPITALIST

## 2022-09-28 PROCEDURE — 96376 TX/PRO/DX INJ SAME DRUG ADON: CPT

## 2022-09-28 PROCEDURE — 94640 AIRWAY INHALATION TREATMENT: CPT

## 2022-09-28 PROCEDURE — 250N000011 HC RX IP 250 OP 636: Performed by: HOSPITALIST

## 2022-09-28 RX ORDER — PREDNISONE 10 MG/1
TABLET ORAL
Qty: 20 TABLET | Refills: 0 | Status: SHIPPED | OUTPATIENT
Start: 2022-09-28 | End: 2022-10-26

## 2022-09-28 RX ORDER — DOXYCYCLINE 100 MG/1
100 CAPSULE ORAL DAILY
Qty: 4 CAPSULE | Refills: 0 | Status: SHIPPED | OUTPATIENT
Start: 2022-09-29 | End: 2022-10-03

## 2022-09-28 RX ORDER — PREDNISONE 10 MG/1
TABLET ORAL
Qty: 20 TABLET | Refills: 0 | Status: SHIPPED | OUTPATIENT
Start: 2022-09-29 | End: 2022-10-26

## 2022-09-28 RX ORDER — IPRATROPIUM BROMIDE AND ALBUTEROL SULFATE 2.5; .5 MG/3ML; MG/3ML
3 SOLUTION RESPIRATORY (INHALATION) EVERY 6 HOURS PRN
Qty: 90 ML | Refills: 1 | Status: ON HOLD | OUTPATIENT
Start: 2022-09-28 | End: 2024-08-09

## 2022-09-28 RX ADMIN — LOSARTAN POTASSIUM 25 MG: 25 TABLET, FILM COATED ORAL at 08:46

## 2022-09-28 RX ADMIN — DOXYCYCLINE HYCLATE 100 MG: 100 CAPSULE ORAL at 08:46

## 2022-09-28 RX ADMIN — GUAIFENESIN 600 MG: 600 TABLET, EXTENDED RELEASE ORAL at 08:46

## 2022-09-28 RX ADMIN — ENOXAPARIN SODIUM 40 MG: 40 INJECTION SUBCUTANEOUS at 08:45

## 2022-09-28 RX ADMIN — ALPRAZOLAM 0.5 MG: 0.5 TABLET ORAL at 00:04

## 2022-09-28 RX ADMIN — FAMOTIDINE 20 MG: 10 INJECTION INTRAVENOUS at 00:04

## 2022-09-28 RX ADMIN — LEVOTHYROXINE SODIUM 112 MCG: 0.11 TABLET ORAL at 08:46

## 2022-09-28 RX ADMIN — METHYLPREDNISOLONE 62.5 MG: 125 INJECTION, POWDER, LYOPHILIZED, FOR SOLUTION INTRAMUSCULAR; INTRAVENOUS at 08:46

## 2022-09-28 RX ADMIN — FAMOTIDINE 20 MG: 10 INJECTION INTRAVENOUS at 08:46

## 2022-09-28 RX ADMIN — PAROXETINE HYDROCHLORIDE 20 MG: 20 TABLET, FILM COATED ORAL at 08:46

## 2022-09-28 RX ADMIN — IPRATROPIUM BROMIDE AND ALBUTEROL SULFATE 3 ML: 2.5; .5 SOLUTION RESPIRATORY (INHALATION) at 07:54

## 2022-09-28 ASSESSMENT — ACTIVITIES OF DAILY LIVING (ADL)
ADLS_ACUITY_SCORE: 35

## 2022-09-28 NOTE — PROGRESS NOTES
Care Management Discharge Note    Discharge Date: 09/28/2022     Discharge Disposition:  Home     Discharge DME:  Home O2 2LPM at baseline     Additional Information:  Pt admitted with COPD exacerbation, noted to have unplanned readmission risk of 22%. Per AM care rounds, provider reports no discharge needs at this time. Please call if needs arise.     Christa Barillas RN BSN   Inpatient Care Coordination  [facility]   Phone [phone]

## 2022-09-28 NOTE — DISCHARGE SUMMARY
[facility]  Hospitalist Discharge Summary      Date of Admission:  9/26/2022  Date of Discharge:  9/28/2022  Discharging Provider: Morales Alva MD  Discharge Service: Hospitalist Service    Discharge Diagnoses   Acute hypoxic respiratory failure due to COPD exacerbation/bronchitis    Follow-ups Needed After Discharge   Follow-up Appointments     Follow-up and recommended labs and tests       Follow up with primary care provider, Chyna Rust, within 7 days   for hospital follow- up.  No follow up labs or test are needed. Discuss   smoking cessation. Repeat CT scan of the chest in 6 months to follow-up on   the nodule seen on CT. This has been ordered. You should receive a call   from scheduling.             Unresulted Labs Ordered in the Past 30 Days of this Admission     No orders found from 8/27/2022 to 9/27/2022.      These results will be followed up by NA    Discharge Disposition   Discharged to home  Condition at discharge: Stable    Hospital Course   Lara Melendez is a 56 year old female who presents with shortness of breath.   Ms Melendez has been prescribed oxygen for home use since she had surgery, January and February of this year.  She reported that after having to neck procedures she was sent home with oxygen both times for use at night.  She was diagnosed with COPD this year as well and had been readmitted for COVID infection and pneumonia both times she discharged back home on oxygen and eventually weaned off.     Most recently she was admitted and discharged August 31 for COPD exacerbation with pneumonia she was treated with antibiotics and steroids with improved symptoms. After discharge she had been wearing oxygen only at [facility].      She started using home oxygen again yesterday (9/25) due to development of shortness of breath.  She put her self on 2 L of oxygen.  She reported some improvement after utilizing nebulizer treatments.  This morning she woke short of  breath at rest and with activity such as getting up to go to the bathroom as she presented to the emergency department for evaluation.  She has not known to have fever.  She reports somewhat productive sputum that is not atypical from her baseline.  No hemoptysis.  No palpitations or chest pain.  She reports slight ankle edema that she notes when she is on steroids.  She denies any history of liver issues or heart disease.      In the ED SpO2 94% on 2LPM, RR 20, /107, .  Vision saturation without supplemental O2 on was not recorded in the ED.  Labs including CBC and BMP, high-sensitivity troponin unremarkable.  Viral panel negative for flu, COVID and RSV. D-dimer was mildly elevated at 0.63 prompting CT chest PE rule out study.  Chest imaging showed no evidence of pulmonary embolism, scattered areas of mucous plugging within the distal airways of the lungs with bronchiectatic change bronchial wall thickening. Remarks suspected hepatic cirrhosis. EKG showed sinus tachycardia.  Added on NT BNP is in process.     Discussed with Dr. Alexandre in the ED, full chart review including lab work, imaging, and vital signs were reviewed. Patient received 3x duoneb, steroids in the ED. mission was requested from observation for further cares and monitoring, mild COPD exacerbation.      COPD exacerbation    - patient feels about the same as yesterday    - still requiring 2L O2    - still has exp wheezing on exam    - discontinue prednisone, start IV solumedrol 60mg IV bid    - cont scheduled and PRN nebs    - continue to attempt to wean O2    - needs outpt Pulm follow-up    - still smoking 1ppd, nicotine patch ordered    - cont doxy that was started on admission 5-7 days   >> now off oxygen    Anxiety    - patient c/o a lot of anxiety here in the hospital    - uses daily xanax at home, will increase to TID PRN     HTN    - cont losartan     HLP    - ok to hold home statin while here     Depression/anxiety    - cont  home paroxetine and venlafaxine     Hypothyroidism    - cont levothyroxine     Daily drinker    - states 2 beers/day    - no history of withdrawal    Patient is doing well today.  She has ambulated in the halls.  She is not needing oxygen.  She feels ready for discharge.  She will be discharged home with a prednisone taper.  She will finish her course of doxycycline.  I have sent her home with duo nebs as well.  She will follow-up with her primary care doctor.  She has been counseled on smoking cessation.  She is also been told she needs a repeat CAT scan in 6 months to follow-up on the nodule seen on CAT scan here.    Consultations This Hospital Stay   None    Code Status   Full Code    Time Spent on this Encounter   I, Morales Alva MD, personally saw the patient today and spent greater than 30 minutes discharging this patient.       Morales Alva MD  [facility] OBSERVATION DEPT  [address] [place] [address]  Phone: [phone]  ______________________________________________________________________    Physical Exam   Vital Signs: Temp: 97.8  F (36.6  C) Temp src: Oral BP: (!) 149/88 Pulse: 72   Resp: 18 SpO2: 95 % O2 Device: None (Room air) Oxygen Delivery: 1 LPM  Weight: 139 lbs 1.6 oz  Constitutional: awake, alert, cooperative, no apparent distress, and appears stated age  Eyes: Lids and lashes normal, pupils equal, round and reactive to light, extra ocular muscles intact, sclera clear, conjunctiva normal  ENT: Normocephalic, without obvious abnormality, atraumatic, sinuses nontender on palpation, external ears without lesions, oral pharynx with moist mucous membranes, tonsils without erythema or exudates, gums normal and good dentition.  Respiratory: No increased work of breathing, good air exchange, clear to auscultation bilaterally, no crackles or wheezing  Cardiovascular: Normal apical impulse, regular rate and rhythm, normal S1 and S2, no S3 or S4, and no murmur  noted  GI: No scars, normal bowel sounds, soft, non-distended, non-tender, no masses palpated, no hepatosplenomegally  Skin: no bruising or bleeding  Musculoskeletal: no lower extremity pitting edema present       Primary Care Physician   Chyna Rust    Discharge Orders      CT Chest w & w/o contrast     Reason for your hospital stay    COPD exacerbation     Activity    Your activity upon discharge: activity as tolerated     Follow-up and recommended labs and tests     Follow up with primary care provider, Chyna Rust, within 7 days for hospital follow- up.  No follow up labs or test are needed. Discuss smoking cessation. Repeat CT scan of the chest in 6 months to follow-up on the nodule seen on CT. This has been ordered. You should receive a call from scheduling.     Miscellaneous DME Order    DME Documentation:   Describe the reason for need to support medical necessity: copd with bronchitis, mucous plugging.     I, the undersigned, certify that the above prescribed supplies are medically necessary for this patient and is both reasonable and necessary in reference to accepted standards of medical and necessary in reference to accepted standards of medical practice in the treatment of this patient's condition and is not prescribed as a convenience.     Diet    Follow this diet upon discharge: Orders Placed This Encounter      Regular Diet Adult       Significant Results and Procedures   Most Recent 3 CBC's:Recent Labs   Lab Test 09/28/22  0644 09/27/22  0651 09/26/22  1447   WBC 4.6 3.5* 5.0   HGB 12.8 13.2 13.5   * 101* 101*   * 148* 141*     Most Recent 3 BMP's:Recent Labs   Lab Test 09/27/22  0651 09/26/22  1447 08/30/22  0624    138 135*   POTASSIUM 4.0 3.4 4.2   CHLORIDE 101 102 100   CO2 25 23 28   BUN 4.7* 6.4 7.8   CR 0.58 0.58 0.55   ANIONGAP 11 13 7   EDIN 9.3 9.4 9.3   * 114* 118*     Most Recent 2 LFT's:Recent Labs   Lab Test 09/27/22  0651 08/29/22  0635   AST 28  61*   ALT 32 55*   ALKPHOS 84 79   BILITOTAL 0.5 0.4   ,   Results for orders placed or performed during the hospital encounter of 09/26/22   CT Chest Pulmonary Embolism w Contrast    Narrative    EXAM: CT CHEST PULMONARY EMBOLISM W CONTRAST  LOCATION: [facility]  DATE/TIME: 9/26/2022 5:10 PM    INDICATION: Shortness of breath; elevated d-dimer.  COMPARISON: 05/10/2022 and 02/21/2022.  TECHNIQUE: CT chest pulmonary angiogram during arterial phase injection of IV contrast. Multiplanar reformats and MIP reconstructions were performed. Dose reduction techniques were used.   CONTRAST: 48mL Isovue 370    FINDINGS:    ANGIOGRAM CHEST: No acute pulmonary embolism.    Thoracic aorta is normal in course and caliber. Mild atheromatous disease.    LUNGS AND PLEURA: Scattered areas of mild linear debris and mucous plugging within the distal airways. Mild diffuse bronchiectatic change and bronchial wall thickening. Focal airspace consolidation. Unchanged chronic, calcified mucous plugging and   scarring within the left upper lobe. Mild centrilobular emphysema.     There is a 4 mm nodule within the right upper lobe, axial image 57, previously measuring 3 mm on 02/21/2022. A subpleural 2 mm nodule in the left lower lobe, axial image 242, is unchanged from prior.     No pneumothorax.     No pleural effusion.     MEDIASTINUM/AXILLAE: No mediastinal/hilar adenopathy.     Thoracic esophagus is unremarkable.      No axillary lymphadenopathy.    Chest wall is unremarkable.    Heart is normal in size.   No pericardial effusion.    CORONARY ARTERY CALCIFICATION: Mild.    UPPER ABDOMEN: Liver surface nodularity, suggestive of cirrhosis.    MUSCULOSKELETAL: No suspicious abnormality.    OTHER: No additionally suspicious abnormality.      Impression    IMPRESSION:  1.  No acute CT abnormality of the chest. Specifically, no acute pulmonary embolism.  2.  Suspected hepatic cirrhosis.  3.  Right upper lobe nodule measures  4 mm, previously 3 mm on 02/21/2022. Chest CT follow-up is recommended in 6 months, given the interval growth in size.             Discharge Medications   Current Discharge Medication List      START taking these medications    Details   doxycycline hyclate (VIBRAMYCIN) 100 MG capsule Take 1 capsule (100 mg) by mouth daily for 4 days  Qty: 4 capsule, Refills: 0    Associated Diagnoses: COPD exacerbation (H)      ipratropium - albuterol 0.5 mg/2.5 mg/3 mL (DUONEB) 0.5-2.5 (3) MG/3ML neb solution Take 1 vial (3 mLs) by nebulization every 6 hours as needed for shortness of breath / dyspnea or wheezing  Qty: 90 mL, Refills: 1    Associated Diagnoses: COPD exacerbation (H)      !! predniSONE (DELTASONE) 10 MG tablet 4 tabs daily for 2 days, then 3 tabs daily for 2 days, then 2 tabs daily for 2 days, then 1 tab daily for 2 days, then stop  Qty: 20 tablet, Refills: 0    Associated Diagnoses: COPD exacerbation (H)      !! predniSONE (DELTASONE) 10 MG tablet 4 tabs daily for 2 days, then 3 tabs daily for 2 days, then 2 tabs daily for 2 days, then 1 tab daily for 2 days, then stop  Qty: 20 tablet, Refills: 0    Associated Diagnoses: COPD exacerbation (H)       !! - Potential duplicate medications found. Please discuss with provider.      CONTINUE these medications which have NOT CHANGED    Details   albuterol (PROAIR HFA/PROVENTIL HFA/VENTOLIN HFA) 108 (90 Base) MCG/ACT inhaler Inhale 2 puffs into the lungs every 4 hours as needed for shortness of breath / dyspnea or wheezing Inhale 1-2 Puffs every 4 hours as needed for Wheezing.  Qty: 18 g, Refills: 0    Comments: Pharmacy may dispense brand covered by insurance (Proair, or proventil or ventolin or generic albuterol inhaler)  Associated Diagnoses: Chronic obstructive pulmonary disease, unspecified COPD type (H)      albuterol (PROVENTIL) (2.5 MG/3ML) 0.083% neb solution Take 1 vial (2.5 mg) by nebulization every 4 hours as needed for shortness of breath / dyspnea or  wheezing  Qty: 30 mL, Refills: 0    Associated Diagnoses: COPD exacerbation (H)      ALPRAZolam (XANAX) 0.25 MG tablet Take 0.5 mg by mouth daily      atorvastatin (LIPITOR) 20 MG tablet Take 20 mg by mouth daily      fluticasone-salmeterol (ADVAIR-HFA) 230-21 MCG/ACT inhaler Inhale 2 puffs into the lungs 2 times daily  Qty: 12 g, Refills: 0    Associated Diagnoses: COPD exacerbation (H); Acute respiratory failure with hypoxia (H)      guaiFENesin (MUCINEX) 600 MG 12 hr tablet Take 1 tablet (600 mg) by mouth 2 times daily  Qty: 30 tablet, Refills: 0    Associated Diagnoses: COPD exacerbation (H); Acute respiratory failure with hypoxia (H)      levothyroxine (SYNTHROID/LEVOTHROID) 112 MCG tablet Take 112 mcg by mouth daily      losartan (COZAAR) 25 MG tablet Take 1 tablet (25 mg) by mouth daily  Qty: 30 tablet, Refills: 1    Associated Diagnoses: Essential hypertension      PARoxetine (PAXIL) 10 MG tablet Take 20 mg by mouth daily      venlafaxine (EFFEXOR-ER) 37.5 MG 24 hr tablet Take 37.5 mg by mouth daily         STOP taking these medications       benzonatate (TESSALON) 100 MG capsule Comments:   Reason for Stopping:             Allergies   Allergies   Allergen Reactions     Sulfa Drugs      Penicillins Rash     Generalized rash  Rash, Generalized

## 2022-09-28 NOTE — PLAN OF CARE
PRIMARY DIAGNOSIS: COPD EXACERBATION  OUTPATIENT/OBSERVATION GOALS TO BE MET BEFORE DISCHARGE:  1. Dyspnea improved and O2 sats >88% on RA or back to baseline O2 levels: Yes   SpO2: 96 %, O2 Device: Nasal cannula     2. Vitals signs normal or return to baseline: Yes    3. Short term supplemental O2 needed with activity at home: Yes    4. Tolerate oral intake to maintain hydration: Yes    5. Return to near baseline physical activity: Yes    Discharge Planner Nurse   Safe discharge environment identified: Yes  Barriers to discharge: Yes       Entered by: Teresa Munson RN 09/28/2022 4:50 AM    Pt A&O x4. VSS on 2L O2 per baseline. Expiratory wheezing noted. Up independently in room. IV saline locked. Tolerating regular diet. Pt denies any pain at this time. Will continue to monitor.   /74 (BP Location: Right arm)   Pulse 70   Temp 98.1  F (36.7  C) (Oral)   Resp 14   Wt 63.1 kg (139 lb 1.6 oz)   SpO2 96%   BMI 23.88 kg/m    Please review provider order for any additional goals.   Nurse to notify provider when observation goals have been met and patient is ready for discharge.

## 2022-09-28 NOTE — PLAN OF CARE
PRIMARY DIAGNOSIS: COPD EXACERBATION  OUTPATIENT/OBSERVATION GOALS TO BE MET BEFORE DISCHARGE:  1. Dyspnea improved and O2 sats >88% on RA or back to baseline O2 levels: Yes   SpO2: 97 %, O2 Device: Nasal cannula    2. Vitals signs normal or return to baseline: Yes    3. Short term supplemental O2 needed with activity at home: Yes    4. Tolerate oral intake to maintain hydration: Yes    5. Return to near baseline physical activity: Yes    Discharge Planner Nurse   Safe discharge environment identified: Yes  Barriers to discharge: Yes       Entered by: Teresa Munson RN 09/28/2022 12:26 AM    Pt A&O x4. VSS on 2L O2 per baseline. Expiratory wheezing noted. Up independently in room. IV saline locked. Tolerating regular diet. Pt denies any pain at this time. Will continue to monitor.   Please review provider order for any additional goals.   Nurse to notify provider when observation goals have been met and patient is ready for discharge.

## 2022-09-28 NOTE — PLAN OF CARE
PRIMARY DIAGNOSIS: COPD exaccerbation  OUTPATIENT/OBSERVATION GOALS TO BE MET BEFORE DISCHARGE:  1. Vital signs stable: Yes    3. Dyspnea improved and O2 sats >88% at RA or at prior home O2 therapy level: Yes      SpO2: 95 %, O2 Device: None (Room air)    4. Short term supplemental O2 needed for use with activity at home:  unsure- pt has home O2 already if needed    5. Tolerating adequate PO diet and medications: Yes    6. Return to near baseline physical activity: Yes    Discharge Planner Nurse   Safe discharge environment identified: Yes  Barriers to discharge: No       Entered by: Veronica Partida RN 09/28/2022      Please review provider order for any additional goals.   Nurse to notify provider when observation goals have been met and patient is ready for discharge.    AOx4. Up ad donald. SOB improved from yesterday. Expiratory wheezes on auscultation. O2 able to be weened this am. Denies pain. Plan for ambulating in halls and possible discharge later today.

## 2022-09-28 NOTE — PLAN OF CARE
PRIMARY DIAGNOSIS: COPD exacerbation  OUTPATIENT/OBSERVATION GOALS TO BE MET BEFORE DISCHARGE:  1. Vital signs stable: Yes    2. Dyspnea improved and O2 sats >88% at RA or at prior home O2 therapy level: Yes      SpO2: 98 %, O2 Device: Nasal cannula    3. Short term supplemental O2 needed for use with activity at home: Yes    4. Tolerating adequate PO diet and medications: Yes    5. Return to near baseline physical activity: Yes    Discharge Planner Nurse   Safe discharge environment identified: Yes  Barriers to discharge: Yes       Entered by: Rosio Wade RN 09/27/2022   Pt AO x4. VSS on 2-3L O2 per baseline. Expiratory wheezing noted. BS active. Up independently. PIV SL. Tolerating regular diet. Pt denies pain. Pt stated she was feeling better than when she arrived. Will continue to monitor.   BP (!) 145/92 (BP Location: Right arm)   Pulse 91   Temp 98.5  F (36.9  C) (Oral)   Resp 20   Wt 59 kg (130 lb)   SpO2 96%   BMI 22.31 kg/m    Please review provider order for any additional goals.   Nurse to notify provider when observation goals have been met and patient is ready for discharge.

## 2022-09-28 NOTE — PLAN OF CARE
Patient's After Visit Summary was reviewed with patient and/or spouse.   Patient verbalized understanding of After Visit Summary, recommended follow up and was given an opportunity to ask questions.   Discharge medications sent home with patient/family: YES   Discharged with spouse    OBSERVATION patient END time: 11:25

## 2022-09-30 ENCOUNTER — PATIENT OUTREACH (OUTPATIENT)
Dept: CARE COORDINATION | Facility: CLINIC | Age: 57
End: 2022-09-30

## 2022-09-30 NOTE — PROGRESS NOTES
Connected Care Resource Center Contact  [facility]/Voicemail     Clinical Data: Transitional Care Management Outreach     Outreach attempted x 2.  Left message on patient's voicemail, providing [facility]'s 24/7 scheduling and nurse triage phone number [phone] ([phone]) for questions/concerns and/or to schedule an appt with an [facility] provider, if they do not have a PCP.      Plan:  [facility] will do no further outreaches at this time.       STANLEY Jay  Connected Care Resource [place], [facility]    *Connected Care Resource Team does NOT follow patient ongoing. Referrals are identified based on internal discharge reports and the outreach is to ensure patient has an understanding of their discharge instructions.

## 2022-10-16 ENCOUNTER — HEALTH MAINTENANCE LETTER (OUTPATIENT)
Age: 57
End: 2022-10-16

## 2022-10-26 ENCOUNTER — HOSPITAL ENCOUNTER (EMERGENCY)
Facility: CLINIC | Age: 57
Discharge: HOME | End: 2022-10-26
Attending: EMERGENCY MEDICINE | Admitting: EMERGENCY MEDICINE
Payer: COMMERCIAL

## 2022-10-26 ENCOUNTER — APPOINTMENT (OUTPATIENT)
Dept: CT IMAGING | Facility: CLINIC | Age: 57
End: 2022-10-26
Attending: EMERGENCY MEDICINE
Payer: COMMERCIAL

## 2022-10-26 VITALS
RESPIRATION RATE: 19 BRPM | HEART RATE: 110 BPM | SYSTOLIC BLOOD PRESSURE: 147 MMHG | DIASTOLIC BLOOD PRESSURE: 112 MMHG | TEMPERATURE: 97.6 F | OXYGEN SATURATION: 94 %

## 2022-10-26 DIAGNOSIS — J44.1 COPD EXACERBATION (H): ICD-10-CM

## 2022-10-26 LAB
ALBUMIN SERPL BCG-MCNC: 3.9 G/DL (ref 3.5–5.2)
ALP SERPL-CCNC: 90 U/L (ref 35–104)
ALT SERPL W P-5'-P-CCNC: 39 U/L (ref 10–35)
ANION GAP SERPL CALCULATED.3IONS-SCNC: 13 MMOL/L (ref 7–15)
AST SERPL W P-5'-P-CCNC: 62 U/L (ref 10–35)
ATRIAL RATE - MUSE: 102 BPM
BASOPHILS # BLD AUTO: 0 10E3/UL (ref 0–0.2)
BASOPHILS NFR BLD AUTO: 1 %
BILIRUB SERPL-MCNC: 0.7 MG/DL
BUN SERPL-MCNC: 5 MG/DL (ref 8–23)
CALCIUM SERPL-MCNC: 9 MG/DL (ref 8.6–10)
CHLORIDE SERPL-SCNC: 103 MMOL/L (ref 98–107)
CREAT SERPL-MCNC: 0.59 MG/DL (ref 0.51–0.95)
D DIMER PPP FEU-MCNC: 0.96 UG/ML FEU (ref 0–0.5)
DEPRECATED HCO3 PLAS-SCNC: 22 MMOL/L (ref 22–29)
DIASTOLIC BLOOD PRESSURE - MUSE: NORMAL MMHG
EOSINOPHIL # BLD AUTO: 0.1 10E3/UL (ref 0–0.7)
EOSINOPHIL NFR BLD AUTO: 4 %
ERYTHROCYTE [DISTWIDTH] IN BLOOD BY AUTOMATED COUNT: 13.2 % (ref 10–15)
FLUAV RNA SPEC QL NAA+PROBE: NEGATIVE
FLUBV RNA RESP QL NAA+PROBE: NEGATIVE
GFR SERPL CREATININE-BSD FRML MDRD: >90 ML/MIN/1.73M2
GLUCOSE SERPL-MCNC: 108 MG/DL (ref 70–99)
HCT VFR BLD AUTO: 41.3 % (ref 35–47)
HGB BLD-MCNC: 13.7 G/DL (ref 11.7–15.7)
IMM GRANULOCYTES # BLD: 0 10E3/UL
IMM GRANULOCYTES NFR BLD: 1 %
INR PPP: 0.91 (ref 0.85–1.15)
INTERPRETATION ECG - MUSE: NORMAL
LYMPHOCYTES # BLD AUTO: 0.8 10E3/UL (ref 0.8–5.3)
LYMPHOCYTES NFR BLD AUTO: 20 %
MCH RBC QN AUTO: 34.2 PG (ref 26.5–33)
MCHC RBC AUTO-ENTMCNC: 33.2 G/DL (ref 31.5–36.5)
MCV RBC AUTO: 103 FL (ref 78–100)
MONOCYTES # BLD AUTO: 0.4 10E3/UL (ref 0–1.3)
MONOCYTES NFR BLD AUTO: 10 %
NEUTROPHILS # BLD AUTO: 2.5 10E3/UL (ref 1.6–8.3)
NEUTROPHILS NFR BLD AUTO: 64 %
NRBC # BLD AUTO: 0 10E3/UL
NRBC BLD AUTO-RTO: 0 /100
NT-PROBNP SERPL-MCNC: 261 PG/ML (ref 0–900)
P AXIS - MUSE: 69 DEGREES
PLAT MORPH BLD: NORMAL
PLATELET # BLD AUTO: 133 10E3/UL (ref 150–450)
POTASSIUM SERPL-SCNC: 4.1 MMOL/L (ref 3.4–5.3)
PR INTERVAL - MUSE: 148 MS
PROT SERPL-MCNC: 7.1 G/DL (ref 6.4–8.3)
QRS DURATION - MUSE: 76 MS
QT - MUSE: 342 MS
QTC - MUSE: 445 MS
R AXIS - MUSE: 61 DEGREES
RBC # BLD AUTO: 4.01 10E6/UL (ref 3.8–5.2)
RBC MORPH BLD: NORMAL
RSV RNA SPEC NAA+PROBE: NEGATIVE
SARS-COV-2 RNA RESP QL NAA+PROBE: NEGATIVE
SODIUM SERPL-SCNC: 138 MMOL/L (ref 136–145)
SYSTOLIC BLOOD PRESSURE - MUSE: NORMAL MMHG
T AXIS - MUSE: 15 DEGREES
TROPONIN T SERPL HS-MCNC: 11 NG/L
VENTRICULAR RATE- MUSE: 102 BPM
WBC # BLD AUTO: 3.9 10E3/UL (ref 4–11)

## 2022-10-26 PROCEDURE — 80053 COMPREHEN METABOLIC PANEL: CPT | Performed by: EMERGENCY MEDICINE

## 2022-10-26 PROCEDURE — 83880 ASSAY OF NATRIURETIC PEPTIDE: CPT | Performed by: EMERGENCY MEDICINE

## 2022-10-26 PROCEDURE — 87637 SARSCOV2&INF A&B&RSV AMP PRB: CPT | Performed by: EMERGENCY MEDICINE

## 2022-10-26 PROCEDURE — C9803 HOPD COVID-19 SPEC COLLECT: HCPCS

## 2022-10-26 PROCEDURE — 85610 PROTHROMBIN TIME: CPT | Performed by: EMERGENCY MEDICINE

## 2022-10-26 PROCEDURE — 85004 AUTOMATED DIFF WBC COUNT: CPT | Performed by: EMERGENCY MEDICINE

## 2022-10-26 PROCEDURE — 93005 ELECTROCARDIOGRAM TRACING: CPT

## 2022-10-26 PROCEDURE — 250N000009 HC RX 250: Performed by: EMERGENCY MEDICINE

## 2022-10-26 PROCEDURE — 258N000003 HC RX IP 258 OP 636: Performed by: EMERGENCY MEDICINE

## 2022-10-26 PROCEDURE — 96361 HYDRATE IV INFUSION ADD-ON: CPT

## 2022-10-26 PROCEDURE — 99285 EMERGENCY DEPT VISIT HI MDM: CPT | Mod: CS,25

## 2022-10-26 PROCEDURE — 250N000011 HC RX IP 250 OP 636: Performed by: EMERGENCY MEDICINE

## 2022-10-26 PROCEDURE — 250N000009 HC RX 250

## 2022-10-26 PROCEDURE — 84484 ASSAY OF TROPONIN QUANT: CPT | Performed by: EMERGENCY MEDICINE

## 2022-10-26 PROCEDURE — 71275 CT ANGIOGRAPHY CHEST: CPT

## 2022-10-26 PROCEDURE — 94640 AIRWAY INHALATION TREATMENT: CPT

## 2022-10-26 PROCEDURE — 96374 THER/PROPH/DIAG INJ IV PUSH: CPT | Mod: 59

## 2022-10-26 PROCEDURE — 85379 FIBRIN DEGRADATION QUANT: CPT | Performed by: EMERGENCY MEDICINE

## 2022-10-26 PROCEDURE — 36415 COLL VENOUS BLD VENIPUNCTURE: CPT | Performed by: EMERGENCY MEDICINE

## 2022-10-26 RX ORDER — PREDNISONE 20 MG/1
TABLET ORAL
Qty: 10 TABLET | Refills: 0 | Status: ON HOLD | OUTPATIENT
Start: 2022-10-26 | End: 2022-12-02

## 2022-10-26 RX ORDER — IOPAMIDOL 755 MG/ML
500 INJECTION, SOLUTION INTRAVASCULAR ONCE
Status: COMPLETED | OUTPATIENT
Start: 2022-10-26 | End: 2022-10-26

## 2022-10-26 RX ORDER — IPRATROPIUM BROMIDE AND ALBUTEROL SULFATE 2.5; .5 MG/3ML; MG/3ML
SOLUTION RESPIRATORY (INHALATION)
Status: COMPLETED
Start: 2022-10-26 | End: 2022-10-26

## 2022-10-26 RX ORDER — LORAZEPAM 2 MG/ML
1 INJECTION INTRAMUSCULAR ONCE
Status: COMPLETED | OUTPATIENT
Start: 2022-10-26 | End: 2022-10-26

## 2022-10-26 RX ORDER — METHYLPREDNISOLONE SODIUM SUCCINATE 125 MG/2ML
125 INJECTION, POWDER, LYOPHILIZED, FOR SOLUTION INTRAMUSCULAR; INTRAVENOUS ONCE
Status: COMPLETED | OUTPATIENT
Start: 2022-10-26 | End: 2022-10-26

## 2022-10-26 RX ORDER — IPRATROPIUM BROMIDE AND ALBUTEROL SULFATE 2.5; .5 MG/3ML; MG/3ML
3 SOLUTION RESPIRATORY (INHALATION)
Status: COMPLETED | OUTPATIENT
Start: 2022-10-26 | End: 2022-10-26

## 2022-10-26 RX ADMIN — SODIUM CHLORIDE 74 ML: 9 INJECTION, SOLUTION INTRAVENOUS at 11:57

## 2022-10-26 RX ADMIN — IOPAMIDOL 50 ML: 755 INJECTION, SOLUTION INTRAVENOUS at 11:57

## 2022-10-26 RX ADMIN — IPRATROPIUM BROMIDE AND ALBUTEROL SULFATE 3 ML: .5; 3 SOLUTION RESPIRATORY (INHALATION) at 10:20

## 2022-10-26 RX ADMIN — METHYLPREDNISOLONE SODIUM SUCCINATE 125 MG: 125 INJECTION, POWDER, FOR SOLUTION INTRAMUSCULAR; INTRAVENOUS at 10:21

## 2022-10-26 RX ADMIN — LORAZEPAM 1 MG: 2 INJECTION INTRAMUSCULAR; INTRAVENOUS at 10:21

## 2022-10-26 RX ADMIN — SODIUM CHLORIDE 1000 ML: 9 INJECTION, SOLUTION INTRAVENOUS at 10:20

## 2022-10-26 RX ADMIN — IPRATROPIUM BROMIDE AND ALBUTEROL SULFATE 3 ML: .5; 3 SOLUTION RESPIRATORY (INHALATION) at 09:28

## 2022-10-26 RX ADMIN — IPRATROPIUM BROMIDE AND ALBUTEROL SULFATE 3 ML: .5; 3 SOLUTION RESPIRATORY (INHALATION) at 09:26

## 2022-10-26 ASSESSMENT — ENCOUNTER SYMPTOMS
COUGH: 0
NERVOUS/ANXIOUS: 1
SHORTNESS OF BREATH: 1
FEVER: 0

## 2022-10-26 ASSESSMENT — ACTIVITIES OF DAILY LIVING (ADL)
ADLS_ACUITY_SCORE: 35
ADLS_ACUITY_SCORE: 33
ADLS_ACUITY_SCORE: 35

## 2022-10-26 NOTE — DISCHARGE INSTRUCTIONS
Start prednisone tonight  Albuterol neb every 4 hours  Follow up in 48 hours with your doctor  Return if breathing worsens

## 2022-10-26 NOTE — ED PROVIDER NOTES
History   Chief Complaint:  Shortness of Breath       HPI   Lara Melendez is a 57 year old female with history of HTN, hyperlipidemia, and COPD who presents with shortness of breath beginning this morning. Patient is always on 2 L of oxygen at night and states that she did not have any trouble breathing last night. She then woke up this morning and had a panic attack, causing her to feel increasingly short of breath. Patient had two albuterol Nebs immediately after the panic attack, which did not offer any relief. She also took one of her Xanax PRN for her anxiety prior to arrival. No recent cough or fever. Possible exposure to multiple sick contacts at work. Patient is vaccinated for COVID but is not boosted. She has not received the flu vaccination this season. Patient is not currently on steroids but did finish a course of Prednisone at the start of October due to a COPD exacerbation. She reports that she had COVID in May, which required hospitalization.     Review of Systems   Constitutional: Negative for fever.   Respiratory: Positive for shortness of breath. Negative for cough.    Psychiatric/Behavioral: The patient is nervous/anxious.    All other systems reviewed and are negative.    Allergies:  Sulfa Drugs  Penicillins    Medications:  Pro-air  Proventil  Xanax  Lipitor  Advair  Synthroid  Duoneb  Cozaar  Deltasone  Paxil  Effexor  Prozac    Past Medical History:     Hypothyroidism  Psoriasis  Tobacco use disorder  HTN  Anxiety  Mixed hyperlipidemia  COVID-19 infection  COPD  Acute respiratory failure  Pulmonary nodule   Diverticulitis    Past Surgical History:    Laparoscopic cholecystectomy  I & D mandible, combined x2  Tooth extraction   Vocal cord surgery    Family History:    Father: Stomach cancer  Mother: DVT/PE, HTN, thyroid disorder  Daughter (s): Factor V Leiden     Social History:  The patient presents to the ED with her spouse  Smoker   PCP: Sudhir Carroll PA-C, Family Medicine      Physical Exam     Patient Vitals for the past 24 hrs:   BP Temp Temp src Pulse Resp SpO2   10/26/22 1330 (!) 147/112 -- -- 110 -- 96 %   10/26/22 1301 (!) 156/127 -- -- -- -- 92 %   10/26/22 1146 -- -- -- -- 19 97 %   10/26/22 1131 -- -- -- -- 28 97 %   10/26/22 1116 -- -- -- -- 21 99 %   10/26/22 1101 -- -- -- -- 19 96 %   10/26/22 1046 -- -- -- -- (!) 64 96 %   10/26/22 1031 (!) 139/96 -- -- 101 16 92 %   10/26/22 1016 -- -- -- -- 23 97 %   10/26/22 1001 -- -- -- -- 17 98 %   10/26/22 0946 124/84 -- -- 102 23 --   10/26/22 0931 -- -- -- -- 23 --   10/26/22 0916 -- -- -- -- 19 90 %   10/26/22 0900 (!) 167/121 97.6  F (36.4  C) Temporal (!) 133 30 98 %       Physical Exam  Constitutional:       Appearance: She is well-developed. She is diaphoretic.   HENT:      Head: Atraumatic.      Right Ear: Tympanic membrane and external ear normal.      Left Ear: Tympanic membrane and external ear normal.      Mouth/Throat:      Pharynx: Oropharynx is clear. No oropharyngeal exudate or posterior oropharyngeal erythema.   Eyes:      General: No scleral icterus.     Extraocular Movements: Extraocular movements intact.      Conjunctiva/sclera: Conjunctivae normal.      Pupils: Pupils are equal, round, and reactive to light.   Cardiovascular:      Rate and Rhythm: Regular rhythm. Tachycardia present.      Heart sounds: Normal heart sounds. No murmur heard.    No friction rub. No gallop.   Pulmonary:      Effort: Respiratory distress present.      Breath sounds: No stridor. Wheezing present. No rhonchi or rales.      Comments: Dec airflow throughout, exp wheezing bibasilar regions, tachypnea, moderate distress  Chest:      Chest wall: No tenderness.   Abdominal:      General: Bowel sounds are normal. There is no distension.      Palpations: Abdomen is soft. There is no mass.      Tenderness: There is no abdominal tenderness.   Musculoskeletal:         General: Normal range of motion.      Cervical back: Normal range of motion  and neck supple.   Lymphadenopathy:      Cervical: No cervical adenopathy.   Skin:     General: Skin is warm.      Capillary Refill: Capillary refill takes less than 2 seconds.      Findings: No rash.   Neurological:      Mental Status: She is alert.           Emergency Department Course   ECG  ECG results from 10/26/22   EKG 12-lead, tracing only     Value    Systolic Blood Pressure     Diastolic Blood Pressure     Ventricular Rate 102    Atrial Rate 102    TN Interval 148    QRS Duration 76        QTc 445    P Axis 69    R AXIS 61    T Axis 15    Interpretation ECG      Sinus tachycardia  Otherwise normal ECG  When compared with ECG of 26-SEP-2022 15:45,  No significant change was found  Confirmed by - EMERGENCY ROOM, PHYSICIAN ([id number]),  PHONG ULRICH ([id number]) on 10/26/2022 11:23:30 AM         Imaging:  CT Chest Pulmonary Embolism w Contrast   Final Result   IMPRESSION:    1. No evidence of pulmonary embolism.   2. Mild emphysematous changes.   3. Few scattered pulmonary nodules including 4 mm right upper lobe   nodule, not significantly changed as compared to 9/26/2022 exam.      TENZIN SHEPHERD MD            SYSTEM ID:  [id number]        Report per radiology    Laboratory:  Labs Ordered and Resulted from Time of ED Arrival to Time of ED Departure   COMPREHENSIVE METABOLIC PANEL - Abnormal       Result Value    Sodium 138      Potassium 4.1      Chloride 103      Carbon Dioxide (CO2) 22      Anion Gap 13      Urea Nitrogen 5.0 (*)     Creatinine 0.59      Calcium 9.0      Glucose 108 (*)     Alkaline Phosphatase 90      AST 62 (*)     ALT 39 (*)     Protein Total 7.1      Albumin 3.9      Bilirubin Total 0.7      GFR Estimate >90     D DIMER QUANTITATIVE - Abnormal    D-Dimer Quantitative 0.96 (*)    CBC WITH PLATELETS AND DIFFERENTIAL - Abnormal    WBC Count 3.9 (*)     RBC Count 4.01      Hemoglobin 13.7      Hematocrit 41.3       (*)     MCH 34.2 (*)     MCHC 33.2      RDW 13.2      Platelet  Count       INR - Normal    INR 0.91     TROPONIN T, HIGH SENSITIVITY - Normal    Troponin T, High Sensitivity 11     NT PROBNP INPATIENT - Normal    N terminal Pro BNP Inpatient 261     INFLUENZA A/B & SARS-COV2 PCR MULTIPLEX - Normal    Influenza A PCR Negative      Influenza B PCR Negative      RSV PCR Negative      SARS CoV2 PCR Negative        Emergency Department Course:   Reviewed:  I reviewed nursing notes, vitals, past medical history and Care Everywhere    Assessments:  0908 I obtained history and examined the patient as noted above.   1330 I rechecked the patient and explained findings.     Interventions:  0926 Duoneb  3 mL  NEB  0928 Duoneb  3mL  NEB  1020 NS  1L  IV  1020 Duoneb  3mL  NEB  1021 Ativan  1 mg  IV  1021 Solu-Medrol  125 mg  IV    Disposition:  The patient was discharged to home.     Impression & Plan     Medical Decision Making:  Lara Melendez is a 57 year old female with a history of COPD who presents for evaluation of shortness of breath.   Signs and symptoms are consistent with COPD exacerbation.  A broad differential was considered including foreign body, COPD, viral induced reactive airway disease, pneumothorax, cardiac equivalent, allergic phenomena, pneumonia, bronchitis, etc.  Patient feels improved after interventions here in ED.   after multiple nebs and Solu-Medrol, patient is feeling better.  She had elevated D-dimer, CT chest showed no evidence of PE.  There is no pneumonia.  She tested negative for COVID.  We did trial ambulation off oxygen.  Her respiratory rate remains below 20.  She feels that she is comfortable going home.  She does have severe anxiety but uses Xanax at home.  I prescribed 5 days of prednisone for her.  She is advised to follow-up with her doctor ASAP.  She does describe flaring up several weeks after prednisone course.  I discussed with her that she needs to see her pulmonologist as well.  Return precautions provided.  She is asked to return if  symptoms worsen or she has increased trouble breathing again.      Diagnosis:    ICD-10-CM    1. COPD exacerbation (H)  J44.1           Discharge Medications:  New Prescriptions    PREDNISONE (DELTASONE) 20 MG TABLET    Take two tablets (= 40mg) each day for 5 (five) days       Scribe Disclosure:  Radha WHITE, am serving as a scribe at 9:18 AM on 10/26/2022 to document services personally performed by Hilda Dejesus MD based on my observations and the provider's statements to me.            Hilda Dejesus MD  10/26/22 5836

## 2022-10-26 NOTE — ED TRIAGE NOTES
Hx mild COPD, recently finished course of prednisone. Pt has home oxygen she uses at night, today O2 was 92% and pt was feeling increasingly SOB so has been using 2L home O2 since 0630 after no relief with 2 neb tx. Also endorses chest heaviness. Tachypnea in triage.

## 2022-12-01 ENCOUNTER — HOSPITAL ENCOUNTER (INPATIENT)
Facility: CLINIC | Age: 57
LOS: 1 days | Discharge: HOME | End: 2022-12-03
Attending: EMERGENCY MEDICINE | Admitting: INTERNAL MEDICINE
Payer: COMMERCIAL

## 2022-12-01 ENCOUNTER — APPOINTMENT (OUTPATIENT)
Dept: GENERAL RADIOLOGY | Facility: CLINIC | Age: 57
End: 2022-12-01
Attending: EMERGENCY MEDICINE
Payer: COMMERCIAL

## 2022-12-01 DIAGNOSIS — J96.01 ACUTE RESPIRATORY FAILURE WITH HYPOXIA (H): ICD-10-CM

## 2022-12-01 DIAGNOSIS — J44.1 CHRONIC OBSTRUCTIVE PULMONARY DISEASE WITH ACUTE EXACERBATION (H): Primary | ICD-10-CM

## 2022-12-01 DIAGNOSIS — J44.1 COPD EXACERBATION (H): ICD-10-CM

## 2022-12-01 LAB
ANION GAP SERPL CALCULATED.3IONS-SCNC: 12 MMOL/L (ref 7–15)
BASE EXCESS BLDV CALC-SCNC: 0.9 MMOL/L (ref -7.7–1.9)
BASOPHILS # BLD AUTO: 0 10E3/UL (ref 0–0.2)
BASOPHILS NFR BLD AUTO: 0 %
BUN SERPL-MCNC: 6 MG/DL (ref 8–23)
CALCIUM SERPL-MCNC: 9.3 MG/DL (ref 8.6–10)
CHLORIDE SERPL-SCNC: 103 MMOL/L (ref 98–107)
CREAT SERPL-MCNC: 0.6 MG/DL (ref 0.51–0.95)
DEPRECATED HCO3 PLAS-SCNC: 25 MMOL/L (ref 22–29)
EOSINOPHIL # BLD AUTO: 0.3 10E3/UL (ref 0–0.7)
EOSINOPHIL NFR BLD AUTO: 4 %
ERYTHROCYTE [DISTWIDTH] IN BLOOD BY AUTOMATED COUNT: 12.9 % (ref 10–15)
FLUAV RNA SPEC QL NAA+PROBE: NEGATIVE
FLUBV RNA RESP QL NAA+PROBE: NEGATIVE
GFR SERPL CREATININE-BSD FRML MDRD: >90 ML/MIN/1.73M2
GLUCOSE SERPL-MCNC: 179 MG/DL (ref 70–99)
HCO3 BLDV-SCNC: 28 MMOL/L (ref 21–28)
HCO3 BLDV-SCNC: 28 MMOL/L (ref 21–28)
HCT VFR BLD AUTO: 42 % (ref 35–47)
HGB BLD-MCNC: 13.9 G/DL (ref 11.7–15.7)
HOLD SPECIMEN: NORMAL
HOLD SPECIMEN: NORMAL
IMM GRANULOCYTES # BLD: 0 10E3/UL
IMM GRANULOCYTES NFR BLD: 1 %
LACTATE BLD-SCNC: 1.2 MMOL/L
LACTATE SERPL-SCNC: 1.3 MMOL/L (ref 0.7–2)
LYMPHOCYTES # BLD AUTO: 2.6 10E3/UL (ref 0.8–5.3)
LYMPHOCYTES NFR BLD AUTO: 33 %
MCH RBC QN AUTO: 34.9 PG (ref 26.5–33)
MCHC RBC AUTO-ENTMCNC: 33.1 G/DL (ref 31.5–36.5)
MCV RBC AUTO: 106 FL (ref 78–100)
MONOCYTES # BLD AUTO: 1 10E3/UL (ref 0–1.3)
MONOCYTES NFR BLD AUTO: 13 %
NEUTROPHILS # BLD AUTO: 3.8 10E3/UL (ref 1.6–8.3)
NEUTROPHILS NFR BLD AUTO: 49 %
NRBC # BLD AUTO: 0 10E3/UL
NRBC BLD AUTO-RTO: 0 /100
O2/TOTAL GAS SETTING VFR VENT: 6 %
OXYHGB MFR BLDV: 90 % (ref 70–75)
PCO2 BLDV: 55 MM HG (ref 40–50)
PCO2 BLDV: 56 MM HG (ref 40–50)
PH BLDV: 7.31 [PH] (ref 7.32–7.43)
PH BLDV: 7.32 [PH] (ref 7.32–7.43)
PLATELET # BLD AUTO: 212 10E3/UL (ref 150–450)
PO2 BLDV: 64 MM HG (ref 25–47)
PO2 BLDV: 65 MM HG (ref 25–47)
POTASSIUM SERPL-SCNC: 3.9 MMOL/L (ref 3.4–5.3)
RBC # BLD AUTO: 3.98 10E6/UL (ref 3.8–5.2)
RSV RNA SPEC NAA+PROBE: NEGATIVE
SAO2 % BLDV: 90 % (ref 94–100)
SARS-COV-2 RNA RESP QL NAA+PROBE: NEGATIVE
SODIUM SERPL-SCNC: 140 MMOL/L (ref 136–145)
TROPONIN T SERPL HS-MCNC: 7 NG/L
WBC # BLD AUTO: 7.7 10E3/UL (ref 4–11)

## 2022-12-01 PROCEDURE — 83036 HEMOGLOBIN GLYCOSYLATED A1C: CPT | Performed by: INTERNAL MEDICINE

## 2022-12-01 PROCEDURE — C9803 HOPD COVID-19 SPEC COLLECT: HCPCS

## 2022-12-01 PROCEDURE — 5A09357 ASSISTANCE WITH RESPIRATORY VENTILATION, LESS THAN 24 CONSECUTIVE HOURS, CONTINUOUS POSITIVE AIRWAY PRESSURE: ICD-10-PCS | Performed by: EMERGENCY MEDICINE

## 2022-12-01 PROCEDURE — 82803 BLOOD GASES ANY COMBINATION: CPT

## 2022-12-01 PROCEDURE — 83605 ASSAY OF LACTIC ACID: CPT

## 2022-12-01 PROCEDURE — 36415 COLL VENOUS BLD VENIPUNCTURE: CPT | Performed by: EMERGENCY MEDICINE

## 2022-12-01 PROCEDURE — 94660 CPAP INITIATION&MGMT: CPT

## 2022-12-01 PROCEDURE — 93005 ELECTROCARDIOGRAM TRACING: CPT

## 2022-12-01 PROCEDURE — 83605 ASSAY OF LACTIC ACID: CPT | Performed by: EMERGENCY MEDICINE

## 2022-12-01 PROCEDURE — 71045 X-RAY EXAM CHEST 1 VIEW: CPT

## 2022-12-01 PROCEDURE — 250N000009 HC RX 250

## 2022-12-01 PROCEDURE — 84484 ASSAY OF TROPONIN QUANT: CPT | Performed by: EMERGENCY MEDICINE

## 2022-12-01 PROCEDURE — 85025 COMPLETE CBC W/AUTO DIFF WBC: CPT | Performed by: EMERGENCY MEDICINE

## 2022-12-01 PROCEDURE — 999N000157 HC STATISTIC RCP TIME EA 10 MIN

## 2022-12-01 PROCEDURE — 87637 SARSCOV2&INF A&B&RSV AMP PRB: CPT | Performed by: EMERGENCY MEDICINE

## 2022-12-01 PROCEDURE — 99285 EMERGENCY DEPT VISIT HI MDM: CPT | Mod: 25

## 2022-12-01 PROCEDURE — 80048 BASIC METABOLIC PNL TOTAL CA: CPT | Performed by: EMERGENCY MEDICINE

## 2022-12-01 PROCEDURE — 94640 AIRWAY INHALATION TREATMENT: CPT

## 2022-12-01 PROCEDURE — 82805 BLOOD GASES W/O2 SATURATION: CPT | Performed by: EMERGENCY MEDICINE

## 2022-12-01 RX ORDER — IPRATROPIUM BROMIDE AND ALBUTEROL SULFATE 2.5; .5 MG/3ML; MG/3ML
SOLUTION RESPIRATORY (INHALATION)
Status: COMPLETED
Start: 2022-12-01 | End: 2022-12-01

## 2022-12-01 RX ORDER — IPRATROPIUM BROMIDE AND ALBUTEROL SULFATE 2.5; .5 MG/3ML; MG/3ML
3 SOLUTION RESPIRATORY (INHALATION)
Status: ACTIVE | OUTPATIENT
Start: 2022-12-01 | End: 2022-12-01

## 2022-12-01 RX ADMIN — IPRATROPIUM BROMIDE AND ALBUTEROL SULFATE 6 ML: 2.5; .5 SOLUTION RESPIRATORY (INHALATION) at 23:07

## 2022-12-01 ASSESSMENT — ENCOUNTER SYMPTOMS
FEVER: 0
SHORTNESS OF BREATH: 1

## 2022-12-01 ASSESSMENT — ACTIVITIES OF DAILY LIVING (ADL): ADLS_ACUITY_SCORE: 35

## 2022-12-02 LAB
ATRIAL RATE - MUSE: 129 BPM
BASE EXCESS BLDV CALC-SCNC: 0.4 MMOL/L (ref -7.7–1.9)
DIASTOLIC BLOOD PRESSURE - MUSE: NORMAL MMHG
GLUCOSE BLDC GLUCOMTR-MCNC: 173 MG/DL (ref 70–99)
HCO3 BLDV-SCNC: 27 MMOL/L (ref 21–28)
INTERPRETATION ECG - MUSE: NORMAL
O2/TOTAL GAS SETTING VFR VENT: 3 %
P AXIS - MUSE: 92 DEGREES
PCO2 BLDV: 49 MM HG (ref 40–50)
PH BLDV: 7.34 [PH] (ref 7.32–7.43)
PO2 BLDV: 52 MM HG (ref 25–47)
PR INTERVAL - MUSE: 138 MS
QRS DURATION - MUSE: 70 MS
QT - MUSE: 308 MS
QTC - MUSE: 451 MS
R AXIS - MUSE: 63 DEGREES
SYSTOLIC BLOOD PRESSURE - MUSE: NORMAL MMHG
T AXIS - MUSE: 33 DEGREES
VENTRICULAR RATE- MUSE: 129 BPM

## 2022-12-02 PROCEDURE — 250N000013 HC RX MED GY IP 250 OP 250 PS 637: Performed by: INTERNAL MEDICINE

## 2022-12-02 PROCEDURE — 82803 BLOOD GASES ANY COMBINATION: CPT | Performed by: INTERNAL MEDICINE

## 2022-12-02 PROCEDURE — 999N000185 HC STATISTIC TRANSPORT TIME EA 15 MIN

## 2022-12-02 PROCEDURE — 250N000009 HC RX 250: Performed by: INTERNAL MEDICINE

## 2022-12-02 PROCEDURE — 94640 AIRWAY INHALATION TREATMENT: CPT | Mod: 76

## 2022-12-02 PROCEDURE — 94640 AIRWAY INHALATION TREATMENT: CPT

## 2022-12-02 PROCEDURE — 250N000011 HC RX IP 250 OP 636: Performed by: INTERNAL MEDICINE

## 2022-12-02 PROCEDURE — 99223 1ST HOSP IP/OBS HIGH 75: CPT | Mod: AI | Performed by: INTERNAL MEDICINE

## 2022-12-02 PROCEDURE — 250N000013 HC RX MED GY IP 250 OP 250 PS 637

## 2022-12-02 PROCEDURE — 200N000001 HC R&B ICU

## 2022-12-02 PROCEDURE — 36415 COLL VENOUS BLD VENIPUNCTURE: CPT | Performed by: INTERNAL MEDICINE

## 2022-12-02 PROCEDURE — 999N000157 HC STATISTIC RCP TIME EA 10 MIN

## 2022-12-02 RX ORDER — CLONAZEPAM 0.5 MG/1
0.5 TABLET ORAL DAILY
Status: ON HOLD | COMMUNITY
End: 2024-08-09

## 2022-12-02 RX ORDER — CARBOXYMETHYLCELLULOSE SODIUM 5 MG/ML
1 SOLUTION/ DROPS OPHTHALMIC
Status: DISCONTINUED | OUTPATIENT
Start: 2022-12-02 | End: 2022-12-03 | Stop reason: HOSPADM

## 2022-12-02 RX ORDER — LIDOCAINE 40 MG/G
CREAM TOPICAL
Status: DISCONTINUED | OUTPATIENT
Start: 2022-12-02 | End: 2022-12-03 | Stop reason: HOSPADM

## 2022-12-02 RX ORDER — NITROGLYCERIN 0.4 MG/1
0.4 TABLET SUBLINGUAL EVERY 5 MIN PRN
Status: DISCONTINUED | OUTPATIENT
Start: 2022-12-02 | End: 2022-12-03 | Stop reason: HOSPADM

## 2022-12-02 RX ORDER — ONDANSETRON 4 MG/1
4 TABLET, ORALLY DISINTEGRATING ORAL EVERY 6 HOURS PRN
Status: DISCONTINUED | OUTPATIENT
Start: 2022-12-02 | End: 2022-12-03 | Stop reason: HOSPADM

## 2022-12-02 RX ORDER — ALBUTEROL SULFATE 90 UG/1
2 AEROSOL, METERED RESPIRATORY (INHALATION)
Status: DISCONTINUED | OUTPATIENT
Start: 2022-12-02 | End: 2022-12-03 | Stop reason: HOSPADM

## 2022-12-02 RX ORDER — ATORVASTATIN CALCIUM 20 MG/1
20 TABLET, FILM COATED ORAL DAILY
Status: DISCONTINUED | OUTPATIENT
Start: 2022-12-02 | End: 2022-12-03 | Stop reason: HOSPADM

## 2022-12-02 RX ORDER — ALPRAZOLAM 0.25 MG
0.5 TABLET ORAL 2 TIMES DAILY PRN
Status: DISCONTINUED | OUTPATIENT
Start: 2022-12-02 | End: 2022-12-02

## 2022-12-02 RX ORDER — AMOXICILLIN 250 MG
1 CAPSULE ORAL 2 TIMES DAILY PRN
Status: DISCONTINUED | OUTPATIENT
Start: 2022-12-02 | End: 2022-12-03 | Stop reason: HOSPADM

## 2022-12-02 RX ORDER — CLONAZEPAM 0.5 MG/1
0.25 TABLET ORAL 2 TIMES DAILY PRN
Status: DISCONTINUED | OUTPATIENT
Start: 2022-12-02 | End: 2022-12-02

## 2022-12-02 RX ORDER — PAROXETINE 20 MG/1
20 TABLET, FILM COATED ORAL DAILY
Status: DISCONTINUED | OUTPATIENT
Start: 2022-12-02 | End: 2022-12-02

## 2022-12-02 RX ORDER — ENOXAPARIN SODIUM 100 MG/ML
40 INJECTION SUBCUTANEOUS EVERY 24 HOURS
Status: DISCONTINUED | OUTPATIENT
Start: 2022-12-02 | End: 2022-12-03 | Stop reason: HOSPADM

## 2022-12-02 RX ORDER — IPRATROPIUM BROMIDE AND ALBUTEROL SULFATE 2.5; .5 MG/3ML; MG/3ML
3 SOLUTION RESPIRATORY (INHALATION)
Status: DISCONTINUED | OUTPATIENT
Start: 2022-12-02 | End: 2022-12-02

## 2022-12-02 RX ORDER — IPRATROPIUM BROMIDE AND ALBUTEROL SULFATE 2.5; .5 MG/3ML; MG/3ML
3 SOLUTION RESPIRATORY (INHALATION)
Status: DISCONTINUED | OUTPATIENT
Start: 2022-12-02 | End: 2022-12-03 | Stop reason: HOSPADM

## 2022-12-02 RX ORDER — LEVOTHYROXINE SODIUM 112 UG/1
112 TABLET ORAL DAILY
Status: DISCONTINUED | OUTPATIENT
Start: 2022-12-02 | End: 2022-12-03 | Stop reason: HOSPADM

## 2022-12-02 RX ORDER — AMOXICILLIN 250 MG
2 CAPSULE ORAL 2 TIMES DAILY PRN
Status: DISCONTINUED | OUTPATIENT
Start: 2022-12-02 | End: 2022-12-03 | Stop reason: HOSPADM

## 2022-12-02 RX ORDER — ALBUTEROL SULFATE 0.83 MG/ML
2.5 SOLUTION RESPIRATORY (INHALATION)
Status: DISCONTINUED | OUTPATIENT
Start: 2022-12-02 | End: 2022-12-03 | Stop reason: HOSPADM

## 2022-12-02 RX ORDER — VENLAFAXINE HYDROCHLORIDE 37.5 MG/1
37.5 CAPSULE, EXTENDED RELEASE ORAL DAILY
Status: DISCONTINUED | OUTPATIENT
Start: 2022-12-02 | End: 2022-12-03 | Stop reason: HOSPADM

## 2022-12-02 RX ORDER — METHYLPREDNISOLONE SODIUM SUCCINATE 125 MG/2ML
60 INJECTION, POWDER, LYOPHILIZED, FOR SOLUTION INTRAMUSCULAR; INTRAVENOUS 2 TIMES DAILY
Status: DISCONTINUED | OUTPATIENT
Start: 2022-12-02 | End: 2022-12-03 | Stop reason: HOSPADM

## 2022-12-02 RX ORDER — CLONAZEPAM 0.25 MG/1
0.25 TABLET, ORALLY DISINTEGRATING ORAL 2 TIMES DAILY PRN
Status: DISCONTINUED | OUTPATIENT
Start: 2022-12-02 | End: 2022-12-03 | Stop reason: HOSPADM

## 2022-12-02 RX ORDER — FLUTICASONE FUROATE AND VILANTEROL 200; 25 UG/1; UG/1
1 POWDER RESPIRATORY (INHALATION) DAILY
Status: DISCONTINUED | OUTPATIENT
Start: 2022-12-02 | End: 2022-12-03 | Stop reason: HOSPADM

## 2022-12-02 RX ORDER — ACETAMINOPHEN 325 MG/1
650 TABLET ORAL EVERY 6 HOURS PRN
Status: DISCONTINUED | OUTPATIENT
Start: 2022-12-02 | End: 2022-12-03 | Stop reason: HOSPADM

## 2022-12-02 RX ORDER — GUAIFENESIN 600 MG/1
600 TABLET, EXTENDED RELEASE ORAL 2 TIMES DAILY PRN
Status: DISCONTINUED | OUTPATIENT
Start: 2022-12-02 | End: 2022-12-03 | Stop reason: HOSPADM

## 2022-12-02 RX ORDER — LOSARTAN POTASSIUM 25 MG/1
25 TABLET ORAL DAILY
Status: DISCONTINUED | OUTPATIENT
Start: 2022-12-02 | End: 2022-12-03 | Stop reason: HOSPADM

## 2022-12-02 RX ORDER — LIDOCAINE 40 MG/G
CREAM TOPICAL
Status: DISCONTINUED | OUTPATIENT
Start: 2022-12-02 | End: 2022-12-02

## 2022-12-02 RX ORDER — ACETAMINOPHEN 650 MG/1
650 SUPPOSITORY RECTAL EVERY 6 HOURS PRN
Status: DISCONTINUED | OUTPATIENT
Start: 2022-12-02 | End: 2022-12-03 | Stop reason: HOSPADM

## 2022-12-02 RX ORDER — ONDANSETRON 2 MG/ML
4 INJECTION INTRAMUSCULAR; INTRAVENOUS EVERY 6 HOURS PRN
Status: DISCONTINUED | OUTPATIENT
Start: 2022-12-02 | End: 2022-12-03 | Stop reason: HOSPADM

## 2022-12-02 RX ADMIN — PAROXETINE HYDROCHLORIDE 20 MG: 20 TABLET, FILM COATED ORAL at 08:38

## 2022-12-02 RX ADMIN — ATORVASTATIN CALCIUM 20 MG: 20 TABLET, FILM COATED ORAL at 08:37

## 2022-12-02 RX ADMIN — LEVOTHYROXINE SODIUM 112 MCG: 0.11 TABLET ORAL at 09:26

## 2022-12-02 RX ADMIN — ACETAMINOPHEN 650 MG: 325 TABLET, FILM COATED ORAL at 09:26

## 2022-12-02 RX ADMIN — IPRATROPIUM BROMIDE AND ALBUTEROL SULFATE 3 ML: 2.5; .5 SOLUTION RESPIRATORY (INHALATION) at 11:59

## 2022-12-02 RX ADMIN — VENLAFAXINE HYDROCHLORIDE 37.5 MG: 37.5 CAPSULE, EXTENDED RELEASE ORAL at 08:37

## 2022-12-02 RX ADMIN — FLUTICASONE FUROATE AND VILANTEROL TRIFENATATE 1 PUFF: 200; 25 POWDER RESPIRATORY (INHALATION) at 09:29

## 2022-12-02 RX ADMIN — LOSARTAN POTASSIUM 25 MG: 25 TABLET, FILM COATED ORAL at 08:37

## 2022-12-02 RX ADMIN — CLONAZEPAM 0.25 MG: 0.25 TABLET, ORALLY DISINTEGRATING ORAL at 22:15

## 2022-12-02 RX ADMIN — IPRATROPIUM BROMIDE AND ALBUTEROL SULFATE 3 ML: 2.5; .5 SOLUTION RESPIRATORY (INHALATION) at 19:59

## 2022-12-02 RX ADMIN — IPRATROPIUM BROMIDE AND ALBUTEROL SULFATE 3 ML: 2.5; .5 SOLUTION RESPIRATORY (INHALATION) at 16:40

## 2022-12-02 RX ADMIN — ENOXAPARIN SODIUM 40 MG: 40 INJECTION SUBCUTANEOUS at 08:39

## 2022-12-02 RX ADMIN — GUAIFENESIN 600 MG: 600 TABLET, EXTENDED RELEASE ORAL at 20:54

## 2022-12-02 RX ADMIN — METHYLPREDNISOLONE SODIUM SUCCINATE 62.5 MG: 125 INJECTION, POWDER, FOR SOLUTION INTRAMUSCULAR; INTRAVENOUS at 20:54

## 2022-12-02 RX ADMIN — IPRATROPIUM BROMIDE AND ALBUTEROL SULFATE 3 ML: .5; 3 SOLUTION RESPIRATORY (INHALATION) at 07:38

## 2022-12-02 RX ADMIN — METHYLPREDNISOLONE SODIUM SUCCINATE 62.5 MG: 125 INJECTION, POWDER, FOR SOLUTION INTRAMUSCULAR; INTRAVENOUS at 08:39

## 2022-12-02 ASSESSMENT — ACTIVITIES OF DAILY LIVING (ADL)
CHANGE_IN_FUNCTIONAL_STATUS_SINCE_ONSET_OF_CURRENT_ILLNESS/INJURY: YES
ADLS_ACUITY_SCORE: 35
ADLS_ACUITY_SCORE: 20
FALL_HISTORY_WITHIN_LAST_SIX_MONTHS: NO
ADLS_ACUITY_SCORE: 20
ADLS_ACUITY_SCORE: 20
DOING_ERRANDS_INDEPENDENTLY_DIFFICULTY: NO
DRESSING/BATHING_DIFFICULTY: NO
ADLS_ACUITY_SCORE: 20
WEAR_GLASSES_OR_BLIND: YES
DIFFICULTY_EATING/SWALLOWING: NO
ADLS_ACUITY_SCORE: 20
VISION_MANAGEMENT: GLASSES
WALKING_OR_CLIMBING_STAIRS_DIFFICULTY: NO
CONCENTRATING,_REMEMBERING_OR_MAKING_DECISIONS_DIFFICULTY: NO
TOILETING_ISSUES: NO
ADLS_ACUITY_SCORE: 20

## 2022-12-02 NOTE — PROGRESS NOTES
Patient seen and examined, assumed care today, H&P reviewed agreed with the plan of care as outlined by Dr. Dr. Navarro.  She was admitted with acute hypoxic respiratory failure due to COPD exacerbation.  In the emergency room she was on BiPAP but did not require it in the ICU.  -Continue bronchodilators and nebulizers.  -Continue steroids, transition to oral prednisone starting tomorrow.  -Currently on 3 L of oxygen saturating 98 to 100%.  -Titrate oxygen down and try to wean her off.  -Started on diet  -Chest x-ray negative.  -No sign of infection, no sign of fluid overload.  -Recent echocardiogram done showed EF of 65 to 70%, no sign of pulmonary hypertension  -Patient is improving and will likely be discharged in 1 to 2 days if she is able to come off oxygen.  -She can be transferred to general medical floor.  I discussed with patient at length the plan of care, all her question and concerns addressed.

## 2022-12-02 NOTE — ED PROVIDER NOTES
History   Chief Complaint:  Shortness of Breath     The history is provided by the patient.      Lara Melendez is a 57 year old female with history of COPD who presents with EMS alone for shortness of breath. Per Chart Review, patient has been seen here on 10/26, 09/26, and 08/29 for COPD exacerbation in the past, which is similar to her symptoms today. Patient reports she has had shortness of breath for two weeks, however within the last three days it has worsened. She has been using her nebulizer with no relief. In addition, reports to use 3 liters of oxygen only at night when needed. Nonetheless, she denies having any fevers or chest pain. Further denies having a history of asthma.    Review of Systems   Constitutional: Negative for fever.   Respiratory: Positive for shortness of breath.    Cardiovascular: Negative for chest pain.   All other systems reviewed and are negative.    Allergies:  Sulfa Drugs  Penicillins    Medications:  Albuterol   Xanax   Effexor   Lipitor   Advair   Synthroid   Paxil   Cozaar     Past Medical History:     Hypothyroidism   COPD   Acute and chronic respiratory failure with hypoxia  Hypertension  Hyperlipidemia   Pulmonary nodule  Psoriasis   Anxiety     Past Surgical History:    Cholecystectomy   I & D x2  Coy tooth extraction     Social History:  The patient presents to the ED alone via EMS.   PCP: Chyna Rust     Physical Exam     Patient Vitals for the past 24 hrs:   BP Temp Pulse Resp SpO2   12/01/22 2307 -- -- -- -- 95 %   12/01/22 2300 113/86 -- (!) 123 21 94 %   12/01/22 2258 -- -- -- -- 94 %   12/01/22 2255 (!) 131/97 -- (!) 126 29 --   12/01/22 2251 (!) 164/100 -- (!) 126 18 94 %   12/01/22 2200 -- -- (!) 133 26 100 %   12/01/22 2155 (!) 188/123 98.1  F (36.7  C) -- -- --   12/01/22 1003 -- -- -- (!) 44 100 %     Physical Exam  Constitutional: Alert, attentive, speaking several word sentences  HENT:    Nose: Nose normal.    Mouth/Throat: Oropharynx is clear,  mucous membranes are moist   Eyes: EOM are normal.   CV: tachycardic, regular rhythm; no murmurs, rubs or gallups  Chest: Effort increased, bilateral expiratory wheezing  GI:  There is no tenderness. No distension. Normal bowel sounds  MSK: Normal range of motion. No LE edema  Neurological: Alert, attentive  Skin: Skin is warm and dry.      Emergency Department Course     ECG:  ECG taken at 2216, ECG read at 2220  Sinus tachycardia   Nonspecific T wave abnormality  Abnormal ECG   No significant changes as compared to prior, dated 10/26/2022.  Rate 129 bpm. OR interval 138 ms. QRS duration 70 ms. QT/QTc 308/451 ms. P-R-T axes 92 63 33.     Imaging:  XR Chest Port 1 View   Final Result   IMPRESSION: Negative chest.        Report per radiology    Laboratory:  Labs Ordered and Resulted from Time of ED Arrival to Time of ED Departure   BASIC METABOLIC PANEL - Abnormal       Result Value    Sodium 140      Potassium 3.9      Chloride 103      Carbon Dioxide (CO2) 25      Anion Gap 12      Urea Nitrogen 6.0 (*)     Creatinine 0.60      Calcium 9.3      Glucose 179 (*)     GFR Estimate >90     BLOOD GAS VENOUS WITH OXYHEMOGLOBIN - Abnormal    pH Venous 7.31 (*)     pCO2 Venous 56 (*)     pO2 Venous 65 (*)     Bicarbonate Venous 28      FIO2 6      Oxyhemoglobin Venous 90 (*)     Base Excess/Deficit (+/-) 0.9     CBC WITH PLATELETS AND DIFFERENTIAL - Abnormal    WBC Count 7.7      RBC Count 3.98      Hemoglobin 13.9      Hematocrit 42.0       (*)     MCH 34.9 (*)     MCHC 33.1      RDW 12.9      Platelet Count 212      % Neutrophils 49      % Lymphocytes 33      % Monocytes 13      % Eosinophils 4      % Basophils 0      % Immature Granulocytes 1      NRBCs per 100 WBC 0      Absolute Neutrophils 3.8      Absolute Lymphocytes 2.6      Absolute Monocytes 1.0      Absolute Eosinophils 0.3      Absolute Basophils 0.0      Absolute Immature Granulocytes 0.0      Absolute NRBCs 0.0     ISTAT GASES LACTATE VENOUS POCT -  Abnormal    Lactic Acid POCT 1.2      Bicarbonate Venous POCT 28      O2 Sat, Venous POCT 90 (*)     pCO2V Venous POCT 55 (*)     pH Venous POCT 7.32      pO2 Venous POCT 64 (*)    LACTIC ACID WHOLE BLOOD - Normal    Lactic Acid 1.3     TROPONIN T, HIGH SENSITIVITY - Normal    Troponin T, High Sensitivity 7     INFLUENZA A/B & SARS-COV2 PCR MULTIPLEX - Normal    Influenza A PCR Negative      Influenza B PCR Negative      RSV PCR Negative      SARS CoV2 PCR Negative       Emergency Department Course:       Reviewed:  I reviewed nursing notes, vitals and past medical history    Assessments:  2202 I obtained history and examined the patient as noted above.   2345 I rechecked the patient and explained findings.     Consults:  0019 I consulted with Dr. Melanie MD from Hospitalist.    Interventions:  Duoneb x 3    Disposition:  The patient was admitted to the hospital under the care of Dr. Melanie MD from Hospitalist.     Impression & Plan     CMS Diagnoses: None.    Medical Decision Making:  This is a 57-year-old female with history of COPD who presents for evaluation of shortness of breath.  Overall, presentation is most consistent with COPD exacerbation with acute hypoxic respiratory failure.  She is indeed critically ill, requiring acute BiPAP therapy and continued nebs in the department.  Work-up shows mild hypercapnia on VBG which will be addressed by her BiPAP therapy.  There is no infiltrate on chest x-ray or other acute findings on metabolic panel, EKG or troponin.  Plan continue intensive cares and admit to hospitalist.  She remains critically ill but is sufficiently stabilized for admission at this time.  No altered mental status, evidence of respiratory tiring, or other indication for intubation at this time.    Diagnosis:    ICD-10-CM    1. COPD exacerbation (H)  J44.1       2. Acute respiratory failure with hypoxia (H)  J96.01           Critical care time: independent of procedures, was 35 minutes.    Scribe  Disclosure:  I, Felecia Gonzalez, am serving as a scribe at 10:03 PM on 12/1/2022 to document services personally performed by Kody Beard MD based on my observations and the provider's statements to me.         Kody Beard MD  12/02/22 0439

## 2022-12-02 NOTE — ED NOTES
[facility]  ED Nurse Handoff Report    Lara Melendez is a 57 year old female   ED Chief complaint: Shortness of Breath  . ED Diagnosis:   Final diagnoses:   None     Allergies:   Allergies   Allergen Reactions    Sulfa Drugs     Penicillins Rash     Generalized rash  Rash, Generalized         Code Status: Full Code  Activity level - Baseline/Home:  Independent. Activity Level - Current:   Stand by Assist. Lift room needed: No. Bariatric: No   Needed: No   Isolation: No. Infection: Not Applicable.     Vital Signs:   Vitals:    12/01/22 2251 12/01/22 2255 12/01/22 2258 12/01/22 2300   BP: (!) 164/100 (!) 131/97  113/86   Pulse: (!) 126 (!) 126  (!) 123   Resp: 18 29  21   Temp:       SpO2: 94%  94% 94%       Cardiac Rhythm:  ,      Pain level:    Patient confused: No. Patient Falls Risk: Yes.   Elimination Status: Has voided   Patient Report - Initial Complaint: SOB, COPD. Focused Assessment:     Neuro Cognitive  Neuro Cognitive  Neuro Cognitive (Adult) Cognitive/Neuro/Behavioral WDL: WDL  SH       2259  Gastrointestinal Gastrointestinal  Gastrointestinal Gastrointestinal WDL: WDL  SH       2259  Cardiac  Cardiac  Cardiac (Adult) Cardiac WDL: WDL  SH       2259  Respiratory  Respiratory  Respiratory (Adult) Airway WDL: WDL   Respiratory WDL Respiratory WDL: .WDL except; rhythm/pattern; expansion/retractions  Rhythm/Pattern, Respiratory: tachypneic         Tests Performed: labs, imaging. Abnormal Results:   Labs Ordered and Resulted from Time of ED Arrival to Time of ED Departure   BASIC METABOLIC PANEL - Abnormal       Result Value    Sodium 140      Potassium 3.9      Chloride 103      Carbon Dioxide (CO2) 25      Anion Gap 12      Urea Nitrogen 6.0 (*)     Creatinine 0.60      Calcium 9.3      Glucose 179 (*)     GFR Estimate >90     BLOOD GAS VENOUS WITH OXYHEMOGLOBIN - Abnormal    pH Venous 7.31 (*)     pCO2 Venous 56 (*)     pO2 Venous 65 (*)     Bicarbonate Venous 28      FIO2 6       Oxyhemoglobin Venous 90 (*)     Base Excess/Deficit (+/-) 0.9     CBC WITH PLATELETS AND DIFFERENTIAL - Abnormal    WBC Count 7.7      RBC Count 3.98      Hemoglobin 13.9      Hematocrit 42.0       (*)     MCH 34.9 (*)     MCHC 33.1      RDW 12.9      Platelet Count 212      % Neutrophils 49      % Lymphocytes 33      % Monocytes 13      % Eosinophils 4      % Basophils 0      % Immature Granulocytes 1      NRBCs per 100 WBC 0      Absolute Neutrophils 3.8      Absolute Lymphocytes 2.6      Absolute Monocytes 1.0      Absolute Eosinophils 0.3      Absolute Basophils 0.0      Absolute Immature Granulocytes 0.0      Absolute NRBCs 0.0     ISTAT GASES LACTATE VENOUS POCT - Abnormal    Lactic Acid POCT 1.2      Bicarbonate Venous POCT 28      O2 Sat, Venous POCT 90 (*)     pCO2V Venous POCT 55 (*)     pH Venous POCT 7.32      pO2 Venous POCT 64 (*)    LACTIC ACID WHOLE BLOOD - Normal    Lactic Acid 1.3     TROPONIN T, HIGH SENSITIVITY - Normal    Troponin T, High Sensitivity 7     INFLUENZA A/B & SARS-COV2 PCR MULTIPLEX - Normal    Influenza A PCR Negative      Influenza B PCR Negative      RSV PCR Negative      SARS CoV2 PCR Negative       XR Chest Port 1 View   Final Result   IMPRESSION: Negative chest.        .   Treatments provided: see MAR  Family Comments: N/A  OBS brochure/video discussed/provided to patient:  Yes  ED Medications:   Medications   ipratropium - albuterol 0.5 mg/2.5 mg/3 mL (DUONEB) 0.5-2.5 (3) MG/3ML neb solution (has no administration in time range)   ipratropium - albuterol 0.5 mg/2.5 mg/3 mL (DUONEB) neb solution 3 mL (has no administration in time range)     Drips infusing:  No  For the majority of the shift, the patient's behavior Green. Interventions performed were N/A.    Sepsis treatment initiated: No     Patient tested for COVID 19 prior to admission: YES    ED Nurse Name/Phone Number: Scar Mcdonald RN,   11:07 PM

## 2022-12-02 NOTE — PROGRESS NOTES
Patient arrived to ICU at 115 and placed on 2 L NC without issue. Has remained off BiPAP through remainder of night without issue.    Mike Wallace, RT on 12/2/2022 at 5:43 AM

## 2022-12-02 NOTE — PLAN OF CARE
ICU End of Shift Summary.  For vital signs and complete assessments, please see documentation flowsheets.      Pertinent assessments:   Neuro: alert and oriented.   Cardiac: ST on arrival - currently SR. Afebrile. VSS.  Resp: LS exp. Wheezes, NC 3L. Productive/congested cough.  GI: WDL. BS audible. No BM during shift.   : WDL.   Skin: WDL  Lines: PIV      Major Shift Events:   Admit to ICU - IMC status.  Pt. arrived on 4L NC - bipap not needed @ this time - able to wean NC to 3L. Pt. Tolerating.  SBA steady gait - dyspnea with exertion.     Plan (Upcoming Events): Continue wean O2 as tolerated.   Discharge/Transfer Needs: TBD     Bedside Shift Report Completed : Y  Bedside Safety Check Completed: Y

## 2022-12-02 NOTE — H&P
[facility]  Hospitalist Admission Note  Name: Lara Melendez    MRN: [id number]  YOB: 1965    Age: 57 year old  Date of admission: 12/1/2022  Primary care provider: Chyna Rust    Chief Complaint: Shortness of breath    Assessment and Plan:   COPD with acute exacerbation  Acute on chronic hypoxemic and hypercapnic respiratory failure: Known COPD and at baseline uses 3 L at night as needed.  Multiple recurrent COPD exacerbations requiring ER visits and prednisone once per month for the last 4 months.  Just finished a course of doxycycline for sinus infection and cough and sinus issues have resolved.  Now here with less than 48 hours of new shortness of breath that is progressively worse along with significant wheezing.  Multiple duo nebs at home not really helping.  O2 sats less than 90% on 2 L for EMS on arrival who provided Solu-Medrol.  Mild hypercapnia with PCO2 56 and pH 7.31 in the ER.  Placed on BiPAP due to tachypnea/increased work of breathing and looks more comfortable now.  Chest x-ray negative for infiltrate.  COVID-19, influenza A/B, RSV PCR negative.  Other lab work unremarkable.  -Continue IV Solu-Medrol 62.5 mg twice daily  -DuoNebs 4 times daily, albuterol nebs and inhaler as needed  -Mucinex as needed  -Continuous pulse ox and supplemental oxygen as needed  -Still needing BiPAP due to mild tachypnea although she is feeling better and likely can wean very soon.  Start as IMC status until weaned from BiPAP  -N.p.o. for now, allow regular diet once off BiPAP  -Resume Advair, substitute for Dulera for hospital formulation  -She does have a pulmonologist and I recommended she contact the clinic given her multiple exacerbations the past few months    Sinus tachycardia: Heart rate 130s initially, now improved.  EKG confirms sinus tachycardia.  Secondary to respiratory failure and nebs.  -Telemetry until off BiPAP and tachycardia improved    HTN: Resume losartan 25 mg  daily.    HLD: Resume atorvastatin.    Anxiety: Takes Xanax as needed at home, ordered 0.5 mg twice daily as needed here.  Resume Paxil and Effexor.    Hypothyroidism: Resume levothyroxine.    DVT Prophylaxis: Enoxaparin (Lovenox) SQ  Code Status: Full Code -confirmed with patient  FEN: N.p.o. until off BiPAP then allowed regular diet, no IV fluids for now  Discharge Dispo: Home  Estimated Disch Date / # of Days until Disch: Admit inpatient initially to IMC status given BiPAP needs.  Clinically improved and likely to wean from BiPAP soon so does not need ICU at this time.  Anticipate minimal to that hospitalization.      History of Present Illness:  Lara Melendez is a 57 year old female with PMH including COPD with multiple recent exacerbations, uses 3 L O2 at bedtime as needed, HTN, HLD, hypothyroidism, anxiety, pulmonary nodules, and psoriasis who presents via EMS for shortness of breath.  She has been seen in the ER once per month for the last 4 months for COPD exacerbation and receives prednisone and then feels better.  She recently finished a 10-day course of doxycycline for a sinus infection where she had sinus pain and cough.  Cough and sinus symptoms have resolved for the most part.  Starting yesterday she developed shortness of breath and increased wheezing.  Her shortness of breath has progressed to the point where she needed to call EMS earlier tonight.  She has been using duo nebs multiple times without improvement.  Symptoms feel similar to previous COPD exacerbations.  She denies any fevers, chills, chest pain, leg pain or swelling, headache, myalgias.  She has not seen her pulmonologist in a while nor notified their clinic for multiple recent ER visits.    History obtained from patient, medical record, and from Dr. Malagon in the emergency department.  Blood pressure initially 188/123 then down to 113/86 once respiratory status improved.  Initially tachycardic in the 130s, now 110s.  Temperature  98.1  F.  She arrived on 5 L and O2 sats were 95%.  She was tachypneic so placed on BiPAP and respiratory status is improved although remains mildly tachypneic and wheezy.  She had already received 125 mg IV Solu-Medrol by EMS.  Received a DuoNeb here.  Initial labs show CBC and BMP within normal limits.  VBG shows pH 7.31, PCO2 56, PO2 65, and HCO3 28.  COVID-19, influenza A/B, RSV PCR negative.  Lactic acid normal at 1.3.  Troponin T normal at 7.  Blood glucose elevated 179.  EKG shows sinus tachycardia with no ST elevation or depression.  Chest x-rays not show any infiltrate.  She looks improved although unable to wean from BiPAP yet she will be admitted inpatient under IM status.       Clinically Significant Risk Factors Present on Admission                  # Hypertension: home medication list includes antihypertensive(s)   # Non-Invasive mechanical ventilation: current O2 Device: BiPAP/CPAP  # Acute hypoxic respiratory failure: continue supplemental O2 as needed                      Past Medical History reviewed:  Past Medical History:   Diagnosis Date     Anxiety      COPD (chronic obstructive pulmonary disease)      Hypercholesterolemia      Hypertension      Hypothyroidism      Past Surgical History reviewed:  Past Surgical History:   Procedure Laterality Date     CHOLECYSTECTOMY       INCISION AND DRAINAGE MANDIBLE, COMBINED Left 01/10/2022    Procedure: INCISION AND DRAINAGE, MANDIBLE;  Surgeon: Jn Feliz DDS;  Location: [facility] OR     INCISION AND DRAINAGE MANDIBLE, COMBINED Left 02/04/2022    Procedure: INCISION AND DRAINAGE, MANDIBLE;  Surgeon: Taylor Ortez DDS;  Location: [facility] OR     TOOTH EXTRACTION       Social History reviewed:  Social History     Tobacco Use     Smoking status: Never     Smokeless tobacco: Never   Substance Use Topics     Alcohol use: Yes     Comment: occ     Social History     Social History Narrative     Not on file     Family History reviewed:  Father history of stomach  cancer  Mother with history of DVT and PE, hypertension, hypothyroidism    Allergies:  Allergies   Allergen Reactions     Sulfa Drugs      Penicillins Rash     Generalized rash  Rash, Generalized       Medications:  Prior to Admission medications    Medication Sig Last Dose Taking? Auth Provider Long Term End Date   albuterol (PROAIR HFA/PROVENTIL HFA/VENTOLIN HFA) 108 (90 Base) MCG/ACT inhaler Inhale 2 puffs into the lungs every 4 hours as needed for shortness of breath / dyspnea or wheezing Inhale 1-2 Puffs every 4 hours as needed for Wheezing.   Mark Olsen DO Yes    albuterol (PROVENTIL) (2.5 MG/3ML) 0.083% neb solution Take 1 vial (2.5 mg) by nebulization every 4 hours as needed for shortness of breath / dyspnea or wheezing   Mark Olsen DO Yes    ALPRAZolam (XANAX) 0.25 MG tablet Take 0.5 mg by mouth daily   Unknown, Entered By History     atorvastatin (LIPITOR) 20 MG tablet Take 20 mg by mouth daily   Reported, Patient Yes    fluticasone-salmeterol (ADVAIR-HFA) 230-21 MCG/ACT inhaler Inhale 2 puffs into the lungs 2 times daily   Mark Olsen DO Yes    guaiFENesin (MUCINEX) 600 MG 12 hr tablet Take 1 tablet (600 mg) by mouth 2 times daily   Mark Olsen DO     ipratropium - albuterol 0.5 mg/2.5 mg/3 mL (DUONEB) 0.5-2.5 (3) MG/3ML neb solution Take 1 vial (3 mLs) by nebulization every 6 hours as needed for shortness of breath / dyspnea or wheezing   Morales Alva MD Yes    levothyroxine (SYNTHROID/LEVOTHROID) 112 MCG tablet Take 112 mcg by mouth daily   Reported, Patient Yes    losartan (COZAAR) 25 MG tablet Take 1 tablet (25 mg) by mouth daily   Kody Prieto DO Yes    PARoxetine (PAXIL) 10 MG tablet Take 20 mg by mouth daily   Reported, Patient Yes    predniSONE (DELTASONE) 20 MG tablet Take two tablets (= 40mg) each day for 5 (five) days   Hilda Dejesus MD     venlafaxine (EFFEXOR-ER) 37.5 MG 24 hr tablet Take 37.5 mg by mouth daily   Unknown, Entered By History Yes      Review  of Systems:  A Comprehensive greater than 10 system review of systems was carried out.  Pertinent positives and negatives are noted above.  Otherwise negative.     Physical Exam:  Blood pressure 113/86, pulse (!) 123, temperature 98.1  F (36.7  C), resp. rate 21, SpO2 95 %.  Wt Readings from Last 1 Encounters:   09/28/22 63.1 kg (139 lb 1.6 oz)     Exam:  Constitutional: Asleep on my arrival on BiPAP, no acute distress  Eyes: sclera white  HEENT: atraumatic, MMM  Respiratory: On BiPAP.  Mild tachypnea.  Mild bilateral expiratory wheeze although good airflow overall.  No focal crackles.  Cardiovascular: Mild regular tachycardia without murmur  GI: non-tender, not distended, bowel sounds present  Skin: no rash or lesions, acyanotic  Musculoskeletal/extremities: atraumatic, no major deformities. No lower extremity edema  Neurologic: A&O, speech clear, answers questions appropriately moves extremities equally  Psychiatric: calm, cooperative, normal affect    Lab and imaging data personally reviewed:  Labs:  Recent Labs   Lab 12/01/22 2200 12/01/22 2159   PH 7.32  --    PHV  --  7.31*   PO2V  --  65*   PCO2V  --  56*   HCO3V 28 28     Recent Labs   Lab 12/01/22 2159   WBC 7.7   HGB 13.9   HCT 42.0   *        Recent Labs   Lab 12/01/22 2159      POTASSIUM 3.9   CHLORIDE 103   CO2 25   ANIONGAP 12   *   BUN 6.0*   CR 0.60   GFRESTIMATED >90   EDIN 9.3     Recent Labs   Lab 12/01/22 2200 12/01/22 2159   LACT 1.2 1.3     Troponin T 7  COVID-19, influenza A/B, RSV PCR negative    EKG: Sinus tachycardia, no ST elevation or depression    Imaging:  Recent Results (from the past 24 hour(s))   XR Chest Port 1 View    Narrative    EXAM: XR CHEST PORT 1 VIEW  LOCATION: [facility]  DATE/TIME: 12/1/2022 10:09 PM    INDICATION: copd exacerbation  COMPARISON: 5/14/2022      Impression    IMPRESSION: Negative chest.       Albin Navarro MD  Hospitalist  [facility]

## 2022-12-02 NOTE — PLAN OF CARE
ICU End of Shift Summary.  For vital signs and complete assessments, please see documentation flowsheets.      Pertinent assessments:   Neuro: A & O X 4  Cardiac: SR, Afebrile, VSS  Resp:LS, expiratory wheeze. Productive cough. NC currently at 2 LPM   GI: BS normoactive. NO BM this shift  : WDL  Skin: WDL  Lines:PIV- SL      Major Shift Events: MOF status, OK to   transfer off unit.   Up IND in room, calls appropriately.   Decrease O2 @ 1600 NC  2 LPM 98%  Pt had HA this am, received Tylenol.     Plan (Upcoming Events): Transfer to medical unit. Decrease O2 LPM as tolerated.     Discharge/Transfer Needs: MOF     Bedside Shift Report Completed :yes   Bedside Safety Check Completed: yes

## 2022-12-02 NOTE — ED TRIAGE NOTES
Arrives via EMS for SOB. COPD. Worsening SOB for the past three days. Has been using nebs, no relief. Chronically on 3 liters NC as needed. 180mg solumedrol given [facility]TA.

## 2022-12-02 NOTE — PHARMACY-ADMISSION MEDICATION HISTORY
Admission medication history interview status for this patient is complete. See [facility] admission navigator for allergy information, prior to admission medications and immunization status.     Medication history interview done, indicate source(s): Patient  Medication history resources (including written lists, pill bottles, clinic record): [facility] dispense records and Care Everywhere [[email or web address]]  Pharmacy: [facility] [id number] [place] [facility] [id number] [place] [address] [phone] and [facility]    Changes made to PTA medication list:  Added: Clonazepam, Tiotropium  inhaler  Removed: Alprazolam, Paroxetine    Actions taken by pharmacist (provider contacted, etc): Discussed with MD    Additional medication history information:None    Medication reconciliation/reorder completed by provider prior to medication history?  Yes    Prior to Admission medications    Medication Sig Last Dose Taking? Auth Provider Long Term End Date   albuterol (PROAIR HFA/PROVENTIL HFA/VENTOLIN HFA) 108 (90 Base) MCG/ACT inhaler Inhale 2 puffs into the lungs every 4 hours as needed for shortness of breath / dyspnea or wheezing Inhale 1-2 Puffs every 4 hours as needed for Wheezing.  Yes Mark Olsen, DO Yes    albuterol (PROVENTIL) (2.5 MG/3ML) 0.083% neb solution Take 1 vial (2.5 mg) by nebulization every 4 hours as needed for shortness of breath / dyspnea or wheezing 12/1/2022 Yes Mark Olsen, DO Yes    atorvastatin (LIPITOR) 20 MG tablet Take 20 mg by mouth daily 12/1/2022 Yes Reported, Patient Yes    clonazePAM (KLONOPIN) 0.5 MG tablet Take 0.25 mg by mouth 2 times daily as needed for anxiety 12/1/2022 Yes Unknown, Entered By History Yes    fluticasone-salmeterol (ADVAIR-HFA) 230-21 MCG/ACT inhaler Inhale 2 puffs into the lungs 2 times daily 12/1/2022 Yes Mark Olsen, DO Yes    guaiFENesin (MUCINEX) 600 MG 12 hr tablet Take 1 tablet (600 mg) by mouth 2 times daily 12/1/2022 Yes Mark Olsen DO     ipratropium -  albuterol 0.5 mg/2.5 mg/3 mL (DUONEB) 0.5-2.5 (3) MG/3ML neb solution Take 1 vial (3 mLs) by nebulization every 6 hours as needed for shortness of breath / dyspnea or wheezing  Yes Morales Alva MD Yes    levothyroxine (SYNTHROID/LEVOTHROID) 112 MCG tablet Take 112 mcg by mouth daily 12/1/2022 Yes Reported, Patient Yes    losartan (COZAAR) 25 MG tablet Take 1 tablet (25 mg) by mouth daily 12/1/2022 Yes Kody Prieto DO Yes    tiotropium (SPIRIVA RESPIMAT) 2.5 MCG/ACT inhaler Inhale 2 puffs into the lungs daily 12/1/2022 Yes Unknown, Entered By History Yes    venlafaxine (EFFEXOR XR) 37.5 MG 24 hr capsule Take 37.5 mg by mouth daily 12/1/2022 Yes Unknown, Entered By History Yes

## 2022-12-03 VITALS
RESPIRATION RATE: 16 BRPM | OXYGEN SATURATION: 95 % | SYSTOLIC BLOOD PRESSURE: 145 MMHG | BODY MASS INDEX: 23.07 KG/M2 | HEART RATE: 91 BPM | HEIGHT: 64 IN | WEIGHT: 135.14 LBS | DIASTOLIC BLOOD PRESSURE: 88 MMHG | TEMPERATURE: 97 F

## 2022-12-03 LAB
ANION GAP SERPL CALCULATED.3IONS-SCNC: 7 MMOL/L (ref 7–15)
BUN SERPL-MCNC: 8.8 MG/DL (ref 8–23)
CALCIUM SERPL-MCNC: 9.3 MG/DL (ref 8.6–10)
CHLORIDE SERPL-SCNC: 104 MMOL/L (ref 98–107)
CREAT SERPL-MCNC: 0.59 MG/DL (ref 0.51–0.95)
DEPRECATED HCO3 PLAS-SCNC: 30 MMOL/L (ref 22–29)
GFR SERPL CREATININE-BSD FRML MDRD: >90 ML/MIN/1.73M2
GLUCOSE SERPL-MCNC: 150 MG/DL (ref 70–99)
HBA1C MFR BLD: 4.4 %
POTASSIUM SERPL-SCNC: 4.7 MMOL/L (ref 3.4–5.3)
SODIUM SERPL-SCNC: 141 MMOL/L (ref 136–145)

## 2022-12-03 PROCEDURE — 250N000009 HC RX 250: Performed by: INTERNAL MEDICINE

## 2022-12-03 PROCEDURE — 80048 BASIC METABOLIC PNL TOTAL CA: CPT | Performed by: INTERNAL MEDICINE

## 2022-12-03 PROCEDURE — 99239 HOSP IP/OBS DSCHRG MGMT >30: CPT | Performed by: INTERNAL MEDICINE

## 2022-12-03 PROCEDURE — 250N000011 HC RX IP 250 OP 636: Performed by: INTERNAL MEDICINE

## 2022-12-03 PROCEDURE — 250N000013 HC RX MED GY IP 250 OP 250 PS 637: Performed by: INTERNAL MEDICINE

## 2022-12-03 PROCEDURE — 36415 COLL VENOUS BLD VENIPUNCTURE: CPT | Performed by: INTERNAL MEDICINE

## 2022-12-03 PROCEDURE — 94640 AIRWAY INHALATION TREATMENT: CPT

## 2022-12-03 PROCEDURE — 999N000157 HC STATISTIC RCP TIME EA 10 MIN

## 2022-12-03 RX ORDER — PREDNISONE 10 MG/1
TABLET ORAL
Qty: 20 TABLET | Refills: 0 | Status: ON HOLD | OUTPATIENT
Start: 2022-12-03 | End: 2023-02-13

## 2022-12-03 RX ADMIN — VENLAFAXINE HYDROCHLORIDE 37.5 MG: 37.5 CAPSULE, EXTENDED RELEASE ORAL at 08:33

## 2022-12-03 RX ADMIN — FLUTICASONE FUROATE AND VILANTEROL TRIFENATATE 1 PUFF: 200; 25 POWDER RESPIRATORY (INHALATION) at 08:05

## 2022-12-03 RX ADMIN — LEVOTHYROXINE SODIUM 112 MCG: 0.11 TABLET ORAL at 08:31

## 2022-12-03 RX ADMIN — METHYLPREDNISOLONE SODIUM SUCCINATE 62.5 MG: 125 INJECTION, POWDER, FOR SOLUTION INTRAMUSCULAR; INTRAVENOUS at 08:32

## 2022-12-03 RX ADMIN — ATORVASTATIN CALCIUM 20 MG: 20 TABLET, FILM COATED ORAL at 08:31

## 2022-12-03 RX ADMIN — ENOXAPARIN SODIUM 40 MG: 40 INJECTION SUBCUTANEOUS at 08:33

## 2022-12-03 RX ADMIN — LOSARTAN POTASSIUM 25 MG: 25 TABLET, FILM COATED ORAL at 08:33

## 2022-12-03 RX ADMIN — IPRATROPIUM BROMIDE AND ALBUTEROL SULFATE 3 ML: 2.5; .5 SOLUTION RESPIRATORY (INHALATION) at 07:55

## 2022-12-03 RX ADMIN — GUAIFENESIN 600 MG: 600 TABLET, EXTENDED RELEASE ORAL at 08:33

## 2022-12-03 ASSESSMENT — ACTIVITIES OF DAILY LIVING (ADL)
DEPENDENT_IADLS:: INDEPENDENT
ADLS_ACUITY_SCORE: 20

## 2022-12-03 NOTE — PLAN OF CARE
This writer took care of pt. From [id number].     ICU End of Shift Summary.  For vital signs and complete assessments, please see documentation flowsheets.      Pertinent assessments:   Neuro: alert and oriented. Denied pain.  Cardiac: SR/ST with ambulating. VSS. Afebrile.  Resp: LS dim/exp. Wheezes. 2L NC. Dyspnea with exertion. Nebs q 4hr.  GI: Regular diet. BS audible.   : WDL  Skin: WDL  Lines: PIV      Major Shift Events:   PRN clonazapam given @ HS.   Steady gait, pt. Independent in room.    Plan (Upcoming Events): Wean o2 as tolerated.   Discharge/Transfer Needs: TBD     Bedside Shift Report Completed : Y  Bedside Safety Check Completed: Y

## 2022-12-03 NOTE — PROGRESS NOTES
VSS.  OOB Independent.  Able to resume activity as tolerated.  Discharge instructions reviewed.  All questions answered.  All belongings returned.  Patient discharged without difficulty.   provided home O2 for transport.

## 2022-12-03 NOTE — DISCHARGE SUMMARY
[facility]  Discharge Summary  Name: Lara Melendez    MRN: [id number]  YOB: 1965    Age: 57 year old  Date of Discharge:  12/3/2022 10:15 AM  Date of Admission: 12/1/2022  Primary Care Provider: Chyna Rust  Discharge Physician:  Suresh Jeter M.D  Discharging Service:  Hospitalist      Discharge Diagnosis:  Acute hypoxemic and hypercapnic respiratory failure.  COPD exacerbation  Tobacco use disorder     Other Diagnosis:  Hypertension  Hyperlipidemia  Anxiety  Hypothyroidism     Discharge Disposition:  Discharged to home     Allergies:  Allergies   Allergen Reactions     Sulfa Drugs      Penicillins Rash     Generalized rash  Rash, Generalized          Discharge Medications:   Discharge Medication List as of 12/3/2022  9:32 AM      START taking these medications    Details   predniSONE (DELTASONE) 10 MG tablet 4 tabs daily for 2 days, then 3 tabs daily for 2 days, then 2 tabs daily for 2 days, then 1 tab daily for 2 days, then stop, Disp-20 tablet, R-0, E-Prescribe         CONTINUE these medications which have NOT CHANGED    Details   albuterol (PROAIR HFA/PROVENTIL HFA/VENTOLIN HFA) 108 (90 Base) MCG/ACT inhaler Inhale 2 puffs into the lungs every 4 hours as needed for shortness of breath / dyspnea or wheezing Inhale 1-2 Puffs every 4 hours as needed for Wheezing., Disp-18 g, R-0, E-PrescribePharmacy may dispense brand covered by insurance (Proair, or proventil  or ventolin or generic albuterol inhaler)      albuterol (PROVENTIL) (2.5 MG/3ML) 0.083% neb solution Take 1 vial (2.5 mg) by nebulization every 4 hours as needed for shortness of breath / dyspnea or wheezing, Disp-30 mL, R-0, E-Prescribe      atorvastatin (LIPITOR) 20 MG tablet Take 20 mg by mouth daily, Historical      clonazePAM (KLONOPIN) 0.5 MG tablet Take 0.25 mg by mouth 2 times daily as needed for anxiety, Historical      fluticasone-salmeterol (ADVAIR-HFA) 230-21 MCG/ACT inhaler Inhale 2 puffs into the lungs  "2 times daily, Disp-12 g, R-0, E-Prescribe      guaiFENesin (MUCINEX) 600 MG 12 hr tablet Take 1 tablet (600 mg) by mouth 2 times daily, Disp-30 tablet, R-0, E-Prescribe      ipratropium - albuterol 0.5 mg/2.5 mg/3 mL (DUONEB) 0.5-2.5 (3) MG/3ML neb solution Take 1 vial (3 mLs) by nebulization every 6 hours as needed for shortness of breath / dyspnea or wheezing, Disp-90 mL, R-1, E-Prescribe      levothyroxine (SYNTHROID/LEVOTHROID) 112 MCG tablet Take 112 mcg by mouth daily, Historical      losartan (COZAAR) 25 MG tablet Take 1 tablet (25 mg) by mouth daily, Disp-30 tablet, R-1, E-Prescribe      tiotropium (SPIRIVA RESPIMAT) 2.5 MCG/ACT inhaler Inhale 2 puffs into the lungs daily, Historical      venlafaxine (EFFEXOR XR) 37.5 MG 24 hr capsule Take 37.5 mg by mouth daily, Historical              Condition on Discharge:  Discharge condition: Stable   Discharge vitals: Blood pressure (!) 145/88, pulse 91, temperature 97  F (36.1  C), temperature source Oral, resp. rate 16, height 1.626 m (5' 4\"), weight 61.3 kg (135 lb 2.3 oz), SpO2 95 %.   Code status on discharge: Full Code     History of Illness:  See detailed admission note for full details.    Significant Physical Exam Findings:  Constitutional: Asleep on my arrival on BiPAP, no acute distress  Eyes: sclera white  HEENT: atraumatic, MMM  Respiratory: On BiPAP.  Mild tachypnea.  Mild bilateral expiratory wheeze although good airflow overall.  No focal crackles.  Cardiovascular: Mild regular tachycardia without murmur  GI: non-tender, not distended, bowel sounds present  Skin: no rash or lesions, acyanotic  Musculoskeletal/extremities: atraumatic, no major deformities. No lower extremity edema  Neurologic: A&O, speech clear, answers questions appropriately moves extremities equally  Psychiatric: calm, cooperative, normal affect    Procedures other than Imaging:  none     Imaging:  Results for orders placed or performed during the hospital encounter of 12/01/22   XR " Chest Port 1 View    Narrative    EXAM: XR CHEST PORT 1 VIEW  LOCATION: [facility]  DATE/TIME: 12/1/2022 10:09 PM    INDICATION: copd exacerbation  COMPARISON: 5/14/2022      Impression    IMPRESSION: Negative chest.        Consultations:  None.     Recent Lab Results:  Recent Labs   Lab 12/01/22 2159   WBC 7.7   HGB 13.9   HCT 42.0   *        Recent Labs   Lab 12/03/22  0556 12/02/22  0124 12/01/22 2159     --  140   POTASSIUM 4.7  --  3.9   CHLORIDE 104  --  103   CO2 30*  --  25   ANIONGAP 7  --  12   * 173* 179*   BUN 8.8  --  6.0*   CR 0.59  --  0.60   GFRESTIMATED >90  --  >90   EDIN 9.3  --  9.3     Recent Labs   Lab 12/03/22  0556 12/02/22  0124 12/01/22 2159   * 173* 179*          Pending Results:    Unresulted Labs Ordered in the Past 30 Days of this Admission     No orders found from 11/1/2022 to 12/2/2022.              Reason for your hospital stay    COPD exacerbation, acute on chronic respiratory failure     Follow-up and recommended labs and tests     Follow up with primary care provider, Chyna Rust, within 7 days for hospital follow- up.  The following labs/tests are recommended: CBC, BMP in 5-7 days result to primary care provider.  Recommend follow-up with pulmonologist as an outpatient     Activity    Your activity upon discharge: activity as tolerated     Oxygen Adult/Peds     Diet    Follow this diet upon discharge: Orders Placed This Encounter      Regular Diet Adult   Lara Melendez is a 57 year old female with PMH including COPD with multiple recent exacerbations, uses 3 L O2 at bedtime as needed, HTN, HLD, hypothyroidism, anxiety, pulmonary nodules, and psoriasis who presented via EMS for shortness of breath.  She was seen in the ER 4 times in the last 4 months for COPD exacerbation, received prednisone and felt better.  This time presented with acute hypoxic respiratory failure, she used her oxygen which she has at home but  did not help her prompting her to come to the emergency room.  It was reported she had been treated with doxycycline for 10 days recently for sinus infection and cough.  This time she was not treated with antibiotics, she was given bronchodilators and nebulizers and was started on steroids and I was able to quickly come off BiPAP.  Patient remains the hospital and able to wean her of oxygen to her baseline.  Patient has oxygen at home which she will use as needed as instructed.  I discussed with patient to follow-up with her primary care provider and pulmonologist as an outpatient.  She was advised to stop smoking as this will make more severe COPD exacerbation in the near future.     Total time spent in face to face contact with the patient and coordinating discharge was:  >30 Minutes.

## 2022-12-03 NOTE — PLAN OF CARE
ICU End of Shift Summary.  For vital signs and complete assessments, please see documentation flowsheets.      Pertinent assessments:   Neuro: A/Ox4, afebrile, denies pain  Cardiac: SR throughout shift, BP WDL  Resp: LS expiratory wheezes bilaterally, 2L NC  GI: BS normoactive, soft, non-tender. No BM this shift  : Pt has not had to urinate this shift, refused when asked.  Skin: WDL, no redness or blanchable areas noted.   Lines: 1 PIV  Drips: non    Plan (Upcoming Events): Start PO steroids, as to discharge home.   Discharge/Transfer Needs: TBD     Bedside Shift Report Completed : Yes  Bedside Safety Check Completed: Yes

## 2022-12-06 ENCOUNTER — PATIENT OUTREACH (OUTPATIENT)
Dept: CARE COORDINATION | Facility: CLINIC | Age: 57
End: 2022-12-06

## 2022-12-06 NOTE — PROGRESS NOTES
Clinic Care Coordination Contact  [facility]/Voicemail       Clinical Data: Care Coordinator Outreach  Outreach attempted x 2.  Unable to lvm    Plan:  Care Coordinator will do no further outreaches at this time.      STANLEY Bueno  [phone]  [facility]

## 2023-02-11 ENCOUNTER — APPOINTMENT (OUTPATIENT)
Dept: GENERAL RADIOLOGY | Facility: CLINIC | Age: 58
End: 2023-02-11
Attending: EMERGENCY MEDICINE
Payer: COMMERCIAL

## 2023-02-11 ENCOUNTER — APPOINTMENT (OUTPATIENT)
Dept: CT IMAGING | Facility: CLINIC | Age: 58
End: 2023-02-11
Attending: EMERGENCY MEDICINE
Payer: COMMERCIAL

## 2023-02-11 ENCOUNTER — HOSPITAL ENCOUNTER (INPATIENT)
Facility: CLINIC | Age: 58
LOS: 2 days | Discharge: HOME | End: 2023-02-13
Attending: EMERGENCY MEDICINE | Admitting: HOSPITALIST
Payer: COMMERCIAL

## 2023-02-11 DIAGNOSIS — J44.1 COPD EXACERBATION (H): ICD-10-CM

## 2023-02-11 DIAGNOSIS — J44.1 CHRONIC OBSTRUCTIVE PULMONARY DISEASE WITH ACUTE EXACERBATION (H): Primary | ICD-10-CM

## 2023-02-11 DIAGNOSIS — R91.8 PULMONARY NODULES: ICD-10-CM

## 2023-02-11 DIAGNOSIS — J96.01 ACUTE RESPIRATORY FAILURE WITH HYPOXIA (H): ICD-10-CM

## 2023-02-11 LAB
ANION GAP SERPL CALCULATED.3IONS-SCNC: 14 MMOL/L (ref 7–15)
BASOPHILS # BLD AUTO: 0 10E3/UL (ref 0–0.2)
BASOPHILS NFR BLD AUTO: 0 %
BUN SERPL-MCNC: 7.1 MG/DL (ref 8–23)
CALCIUM SERPL-MCNC: 9.2 MG/DL (ref 8.6–10)
CHLORIDE SERPL-SCNC: 102 MMOL/L (ref 98–107)
CREAT SERPL-MCNC: 0.55 MG/DL (ref 0.51–0.95)
CREAT SERPL-MCNC: 0.57 MG/DL (ref 0.51–0.95)
D DIMER PPP FEU-MCNC: 1.16 UG/ML FEU (ref 0–0.5)
DEPRECATED HCO3 PLAS-SCNC: 22 MMOL/L (ref 22–29)
EOSINOPHIL # BLD AUTO: 0 10E3/UL (ref 0–0.7)
EOSINOPHIL NFR BLD AUTO: 1 %
ERYTHROCYTE [DISTWIDTH] IN BLOOD BY AUTOMATED COUNT: 13.3 % (ref 10–15)
FLUAV RNA SPEC QL NAA+PROBE: NEGATIVE
FLUBV RNA RESP QL NAA+PROBE: NEGATIVE
GFR SERPL CREATININE-BSD FRML MDRD: >90 ML/MIN/1.73M2
GFR SERPL CREATININE-BSD FRML MDRD: >90 ML/MIN/1.73M2
GLUCOSE BLDC GLUCOMTR-MCNC: 146 MG/DL (ref 70–99)
GLUCOSE BLDC GLUCOMTR-MCNC: 161 MG/DL (ref 70–99)
GLUCOSE SERPL-MCNC: 162 MG/DL (ref 70–99)
HCO3 BLDV-SCNC: 22 MMOL/L (ref 21–28)
HCO3 BLDV-SCNC: 27 MMOL/L (ref 21–28)
HCT VFR BLD AUTO: 39.7 % (ref 35–47)
HGB BLD-MCNC: 12.9 G/DL (ref 11.7–15.7)
HOLD SPECIMEN: NORMAL
IMM GRANULOCYTES # BLD: 0.1 10E3/UL
IMM GRANULOCYTES NFR BLD: 1 %
LACTATE BLD-SCNC: 1.2 MMOL/L
LACTATE BLD-SCNC: 3.5 MMOL/L
LYMPHOCYTES # BLD AUTO: 0.9 10E3/UL (ref 0.8–5.3)
LYMPHOCYTES NFR BLD AUTO: 14 %
MCH RBC QN AUTO: 34 PG (ref 26.5–33)
MCHC RBC AUTO-ENTMCNC: 32.5 G/DL (ref 31.5–36.5)
MCV RBC AUTO: 105 FL (ref 78–100)
MONOCYTES # BLD AUTO: 0.4 10E3/UL (ref 0–1.3)
MONOCYTES NFR BLD AUTO: 7 %
NEUTROPHILS # BLD AUTO: 4.9 10E3/UL (ref 1.6–8.3)
NEUTROPHILS NFR BLD AUTO: 77 %
NRBC # BLD AUTO: 0 10E3/UL
NRBC BLD AUTO-RTO: 0 /100
NT-PROBNP SERPL-MCNC: 180 PG/ML (ref 0–900)
PCO2 BLDV: 46 MM HG (ref 40–50)
PCO2 BLDV: 47 MM HG (ref 40–50)
PH BLDV: 7.29 [PH] (ref 7.32–7.43)
PH BLDV: 7.37 [PH] (ref 7.32–7.43)
PLATELET # BLD AUTO: 187 10E3/UL (ref 150–450)
PO2 BLDV: 34 MM HG (ref 25–47)
PO2 BLDV: 97 MM HG (ref 25–47)
POTASSIUM SERPL-SCNC: 5 MMOL/L (ref 3.4–5.3)
PROCALCITONIN SERPL IA-MCNC: 0.04 NG/ML
RBC # BLD AUTO: 3.79 10E6/UL (ref 3.8–5.2)
RSV RNA SPEC NAA+PROBE: NEGATIVE
SAO2 % BLDV: 63 % (ref 94–100)
SAO2 % BLDV: 97 % (ref 94–100)
SARS-COV-2 RNA RESP QL NAA+PROBE: NEGATIVE
SODIUM SERPL-SCNC: 138 MMOL/L (ref 136–145)
TROPONIN T SERPL HS-MCNC: 10 NG/L
WBC # BLD AUTO: 6.3 10E3/UL (ref 4–11)

## 2023-02-11 PROCEDURE — 84145 PROCALCITONIN (PCT): CPT | Performed by: EMERGENCY MEDICINE

## 2023-02-11 PROCEDURE — 71045 X-RAY EXAM CHEST 1 VIEW: CPT

## 2023-02-11 PROCEDURE — 5A09357 ASSISTANCE WITH RESPIRATORY VENTILATION, LESS THAN 24 CONSECUTIVE HOURS, CONTINUOUS POSITIVE AIRWAY PRESSURE: ICD-10-PCS | Performed by: EMERGENCY MEDICINE

## 2023-02-11 PROCEDURE — 250N000013 HC RX MED GY IP 250 OP 250 PS 637: Performed by: HOSPITALIST

## 2023-02-11 PROCEDURE — 250N000009 HC RX 250: Performed by: EMERGENCY MEDICINE

## 2023-02-11 PROCEDURE — 96365 THER/PROPH/DIAG IV INF INIT: CPT | Mod: 59

## 2023-02-11 PROCEDURE — 83880 ASSAY OF NATRIURETIC PEPTIDE: CPT | Performed by: EMERGENCY MEDICINE

## 2023-02-11 PROCEDURE — C9803 HOPD COVID-19 SPEC COLLECT: HCPCS

## 2023-02-11 PROCEDURE — 36415 COLL VENOUS BLD VENIPUNCTURE: CPT | Performed by: HOSPITALIST

## 2023-02-11 PROCEDURE — 99223 1ST HOSP IP/OBS HIGH 75: CPT | Performed by: HOSPITALIST

## 2023-02-11 PROCEDURE — 999N000185 HC STATISTIC TRANSPORT TIME EA 15 MIN

## 2023-02-11 PROCEDURE — 82803 BLOOD GASES ANY COMBINATION: CPT

## 2023-02-11 PROCEDURE — 999N000157 HC STATISTIC RCP TIME EA 10 MIN

## 2023-02-11 PROCEDURE — 85025 COMPLETE CBC W/AUTO DIFF WBC: CPT | Performed by: EMERGENCY MEDICINE

## 2023-02-11 PROCEDURE — 93005 ELECTROCARDIOGRAM TRACING: CPT

## 2023-02-11 PROCEDURE — 82565 ASSAY OF CREATININE: CPT | Performed by: HOSPITALIST

## 2023-02-11 PROCEDURE — 71275 CT ANGIOGRAPHY CHEST: CPT

## 2023-02-11 PROCEDURE — 250N000011 HC RX IP 250 OP 636: Performed by: EMERGENCY MEDICINE

## 2023-02-11 PROCEDURE — 258N000003 HC RX IP 258 OP 636: Performed by: EMERGENCY MEDICINE

## 2023-02-11 PROCEDURE — 99285 EMERGENCY DEPT VISIT HI MDM: CPT | Mod: 25,CS

## 2023-02-11 PROCEDURE — 87637 SARSCOV2&INF A&B&RSV AMP PRB: CPT | Performed by: EMERGENCY MEDICINE

## 2023-02-11 PROCEDURE — 94640 AIRWAY INHALATION TREATMENT: CPT

## 2023-02-11 PROCEDURE — 80048 BASIC METABOLIC PNL TOTAL CA: CPT | Performed by: EMERGENCY MEDICINE

## 2023-02-11 PROCEDURE — 84484 ASSAY OF TROPONIN QUANT: CPT | Performed by: EMERGENCY MEDICINE

## 2023-02-11 PROCEDURE — 94640 AIRWAY INHALATION TREATMENT: CPT | Mod: 76

## 2023-02-11 PROCEDURE — 250N000011 HC RX IP 250 OP 636: Performed by: HOSPITALIST

## 2023-02-11 PROCEDURE — 96361 HYDRATE IV INFUSION ADD-ON: CPT

## 2023-02-11 PROCEDURE — 96375 TX/PRO/DX INJ NEW DRUG ADDON: CPT

## 2023-02-11 PROCEDURE — 94660 CPAP INITIATION&MGMT: CPT

## 2023-02-11 PROCEDURE — 200N000001 HC R&B ICU

## 2023-02-11 PROCEDURE — 85379 FIBRIN DEGRADATION QUANT: CPT | Performed by: EMERGENCY MEDICINE

## 2023-02-11 PROCEDURE — 83605 ASSAY OF LACTIC ACID: CPT

## 2023-02-11 PROCEDURE — 36415 COLL VENOUS BLD VENIPUNCTURE: CPT | Performed by: EMERGENCY MEDICINE

## 2023-02-11 PROCEDURE — 250N000009 HC RX 250: Performed by: HOSPITALIST

## 2023-02-11 RX ORDER — ONDANSETRON 2 MG/ML
4 INJECTION INTRAMUSCULAR; INTRAVENOUS EVERY 6 HOURS PRN
Status: DISCONTINUED | OUTPATIENT
Start: 2023-02-11 | End: 2023-02-13 | Stop reason: HOSPADM

## 2023-02-11 RX ORDER — LEVALBUTEROL INHALATION SOLUTION 1.25 MG/3ML
1.25 SOLUTION RESPIRATORY (INHALATION)
Status: DISCONTINUED | OUTPATIENT
Start: 2023-02-11 | End: 2023-02-13 | Stop reason: HOSPADM

## 2023-02-11 RX ORDER — NICOTINE 21 MG/24HR
1 PATCH, TRANSDERMAL 24 HOURS TRANSDERMAL DAILY
Status: DISCONTINUED | OUTPATIENT
Start: 2023-02-11 | End: 2023-02-13 | Stop reason: HOSPADM

## 2023-02-11 RX ORDER — ENOXAPARIN SODIUM 100 MG/ML
40 INJECTION SUBCUTANEOUS EVERY 24 HOURS
Status: DISCONTINUED | OUTPATIENT
Start: 2023-02-11 | End: 2023-02-13 | Stop reason: HOSPADM

## 2023-02-11 RX ORDER — ALBUTEROL SULFATE 0.83 MG/ML
2.5 SOLUTION RESPIRATORY (INHALATION) ONCE
Status: COMPLETED | OUTPATIENT
Start: 2023-02-11 | End: 2023-02-11

## 2023-02-11 RX ORDER — METHYLPREDNISOLONE SODIUM SUCCINATE 40 MG/ML
40 INJECTION, POWDER, LYOPHILIZED, FOR SOLUTION INTRAMUSCULAR; INTRAVENOUS EVERY 8 HOURS
Status: COMPLETED | OUTPATIENT
Start: 2023-02-11 | End: 2023-02-12

## 2023-02-11 RX ORDER — IOPAMIDOL 755 MG/ML
500 INJECTION, SOLUTION INTRAVASCULAR ONCE
Status: COMPLETED | OUTPATIENT
Start: 2023-02-11 | End: 2023-02-11

## 2023-02-11 RX ORDER — ONDANSETRON 4 MG/1
4 TABLET, ORALLY DISINTEGRATING ORAL EVERY 6 HOURS PRN
Status: DISCONTINUED | OUTPATIENT
Start: 2023-02-11 | End: 2023-02-13 | Stop reason: HOSPADM

## 2023-02-11 RX ORDER — MAGNESIUM SULFATE HEPTAHYDRATE 40 MG/ML
2 INJECTION, SOLUTION INTRAVENOUS ONCE
Status: COMPLETED | OUTPATIENT
Start: 2023-02-11 | End: 2023-02-11

## 2023-02-11 RX ORDER — CLONAZEPAM 0.25 MG/1
0.25 TABLET, ORALLY DISINTEGRATING ORAL 2 TIMES DAILY PRN
Status: DISCONTINUED | OUTPATIENT
Start: 2023-02-11 | End: 2023-02-13 | Stop reason: HOSPADM

## 2023-02-11 RX ORDER — DEXTROSE MONOHYDRATE 25 G/50ML
25-50 INJECTION, SOLUTION INTRAVENOUS
Status: DISCONTINUED | OUTPATIENT
Start: 2023-02-11 | End: 2023-02-13 | Stop reason: HOSPADM

## 2023-02-11 RX ORDER — METHYLPREDNISOLONE SODIUM SUCCINATE 125 MG/2ML
125 INJECTION, POWDER, LYOPHILIZED, FOR SOLUTION INTRAMUSCULAR; INTRAVENOUS ONCE
Status: COMPLETED | OUTPATIENT
Start: 2023-02-11 | End: 2023-02-11

## 2023-02-11 RX ORDER — ACETAMINOPHEN 650 MG/1
650 SUPPOSITORY RECTAL EVERY 4 HOURS PRN
Status: DISCONTINUED | OUTPATIENT
Start: 2023-02-11 | End: 2023-02-13 | Stop reason: HOSPADM

## 2023-02-11 RX ORDER — ALBUTEROL SULFATE 0.83 MG/ML
2.5 SOLUTION RESPIRATORY (INHALATION) EVERY 4 HOURS PRN
Status: DISCONTINUED | OUTPATIENT
Start: 2023-02-11 | End: 2023-02-13 | Stop reason: HOSPADM

## 2023-02-11 RX ORDER — GUAIFENESIN 600 MG/1
600 TABLET, EXTENDED RELEASE ORAL 2 TIMES DAILY
Status: DISCONTINUED | OUTPATIENT
Start: 2023-02-11 | End: 2023-02-13 | Stop reason: HOSPADM

## 2023-02-11 RX ORDER — FLUTICASONE FUROATE AND VILANTEROL 200; 25 UG/1; UG/1
1 POWDER RESPIRATORY (INHALATION) DAILY
Status: DISCONTINUED | OUTPATIENT
Start: 2023-02-11 | End: 2023-02-13 | Stop reason: HOSPADM

## 2023-02-11 RX ORDER — ATORVASTATIN CALCIUM 20 MG/1
20 TABLET, FILM COATED ORAL DAILY
Status: DISCONTINUED | OUTPATIENT
Start: 2023-02-11 | End: 2023-02-13 | Stop reason: HOSPADM

## 2023-02-11 RX ORDER — PAROXETINE 20 MG/1
20 TABLET, FILM COATED ORAL DAILY
COMMUNITY

## 2023-02-11 RX ORDER — VENLAFAXINE HYDROCHLORIDE 37.5 MG/1
37.5 CAPSULE, EXTENDED RELEASE ORAL DAILY
Status: DISCONTINUED | OUTPATIENT
Start: 2023-02-11 | End: 2023-02-12

## 2023-02-11 RX ORDER — LOSARTAN POTASSIUM 25 MG/1
25 TABLET ORAL DAILY
Status: DISCONTINUED | OUTPATIENT
Start: 2023-02-11 | End: 2023-02-13 | Stop reason: HOSPADM

## 2023-02-11 RX ORDER — LEVALBUTEROL INHALATION SOLUTION 1.25 MG/3ML
1.25 SOLUTION RESPIRATORY (INHALATION) 4 TIMES DAILY
Status: DISCONTINUED | OUTPATIENT
Start: 2023-02-11 | End: 2023-02-11

## 2023-02-11 RX ORDER — ACETAMINOPHEN 325 MG/1
650 TABLET ORAL EVERY 4 HOURS PRN
Status: DISCONTINUED | OUTPATIENT
Start: 2023-02-11 | End: 2023-02-13 | Stop reason: HOSPADM

## 2023-02-11 RX ORDER — NICOTINE POLACRILEX 4 MG
15-30 LOZENGE BUCCAL
Status: DISCONTINUED | OUTPATIENT
Start: 2023-02-11 | End: 2023-02-13 | Stop reason: HOSPADM

## 2023-02-11 RX ORDER — LEVOTHYROXINE SODIUM 112 UG/1
112 TABLET ORAL DAILY
Status: DISCONTINUED | OUTPATIENT
Start: 2023-02-11 | End: 2023-02-13 | Stop reason: HOSPADM

## 2023-02-11 RX ADMIN — ALBUTEROL SULFATE 2.5 MG: 2.5 SOLUTION RESPIRATORY (INHALATION) at 08:37

## 2023-02-11 RX ADMIN — LEVALBUTEROL HYDROCHLORIDE 1.25 MG: 1.25 SOLUTION RESPIRATORY (INHALATION) at 20:39

## 2023-02-11 RX ADMIN — SODIUM CHLORIDE 1000 ML: 9 INJECTION, SOLUTION INTRAVENOUS at 09:10

## 2023-02-11 RX ADMIN — VENLAFAXINE HYDROCHLORIDE 37.5 MG: 37.5 CAPSULE, EXTENDED RELEASE ORAL at 16:22

## 2023-02-11 RX ADMIN — IOPAMIDOL 62 ML: 755 INJECTION, SOLUTION INTRAVENOUS at 12:14

## 2023-02-11 RX ADMIN — ENOXAPARIN SODIUM 40 MG: 40 INJECTION SUBCUTANEOUS at 16:29

## 2023-02-11 RX ADMIN — METHYLPREDNISOLONE SODIUM SUCCINATE 40 MG: 40 INJECTION, POWDER, FOR SOLUTION INTRAMUSCULAR; INTRAVENOUS at 16:21

## 2023-02-11 RX ADMIN — LEVOTHYROXINE SODIUM 112 MCG: 112 TABLET ORAL at 16:21

## 2023-02-11 RX ADMIN — ACETAMINOPHEN 650 MG: 325 TABLET ORAL at 21:46

## 2023-02-11 RX ADMIN — NICOTINE 1 PATCH: 14 PATCH, EXTENDED RELEASE TRANSDERMAL at 16:21

## 2023-02-11 RX ADMIN — METHYLPREDNISOLONE SODIUM SUCCINATE 125 MG: 125 INJECTION, POWDER, FOR SOLUTION INTRAMUSCULAR; INTRAVENOUS at 08:38

## 2023-02-11 RX ADMIN — ALBUTEROL SULFATE 2.5 MG: 2.5 SOLUTION RESPIRATORY (INHALATION) at 09:23

## 2023-02-11 RX ADMIN — LEVALBUTEROL HYDROCHLORIDE 1.25 MG: 1.25 SOLUTION RESPIRATORY (INHALATION) at 16:50

## 2023-02-11 RX ADMIN — SODIUM CHLORIDE 84 ML: 9 INJECTION, SOLUTION INTRAVENOUS at 12:14

## 2023-02-11 RX ADMIN — GUAIFENESIN 600 MG: 600 TABLET, EXTENDED RELEASE ORAL at 20:02

## 2023-02-11 RX ADMIN — ATORVASTATIN CALCIUM 20 MG: 20 TABLET, FILM COATED ORAL at 16:22

## 2023-02-11 RX ADMIN — LOSARTAN POTASSIUM 25 MG: 25 TABLET, FILM COATED ORAL at 16:22

## 2023-02-11 RX ADMIN — MAGNESIUM SULFATE HEPTAHYDRATE 2 G: 40 INJECTION, SOLUTION INTRAVENOUS at 08:38

## 2023-02-11 ASSESSMENT — ACTIVITIES OF DAILY LIVING (ADL)
TOILETING_ISSUES: NO
WEAR_GLASSES_OR_BLIND: YES
ADLS_ACUITY_SCORE: 35
CHANGE_IN_FUNCTIONAL_STATUS_SINCE_ONSET_OF_CURRENT_ILLNESS/INJURY: YES
ADLS_ACUITY_SCORE: 35
DOING_ERRANDS_INDEPENDENTLY_DIFFICULTY: NO
ADLS_ACUITY_SCORE: 23
DRESSING/BATHING_DIFFICULTY: NO
FALL_HISTORY_WITHIN_LAST_SIX_MONTHS: NO
WALKING_OR_CLIMBING_STAIRS_DIFFICULTY: NO
ADLS_ACUITY_SCORE: 23
ADLS_ACUITY_SCORE: 35
ADLS_ACUITY_SCORE: 21
ADLS_ACUITY_SCORE: 35
CONCENTRATING,_REMEMBERING_OR_MAKING_DECISIONS_DIFFICULTY: NO
ADLS_ACUITY_SCORE: 24
DIFFICULTY_EATING/SWALLOWING: NO

## 2023-02-11 ASSESSMENT — ENCOUNTER SYMPTOMS
ABDOMINAL PAIN: 0
FEVER: 0
VOMITING: 0
SHORTNESS OF BREATH: 1
NAUSEA: 0
COUGH: 1

## 2023-02-11 NOTE — PLAN OF CARE
A&Ox4. Admit to ICU this shift. Off BiPAP within 10 mins. Now on 3 LPM NC. Lungs dim, ex wheezes. Tele ST. Skin intact. See Flowsheet charting

## 2023-02-11 NOTE — PHARMACY-ADMISSION MEDICATION HISTORY
Admission medication history interview status for this patient is complete. See [facility] admission navigator for allergy information, prior to admission medications and immunization status.     Medication history interview done, indicate source(s): Patient  Medication history resources (including written lists, pill bottles, clinic record): Rx refill hx  Pharmacy:  [facility]    Changes made to PTA medication list:  Added:  Paxil  Changed:    -Clonazepam 0.25mg bid--> 0.5mg daily  -Mucinex 600mg bid--> 1200mg bid  -Scheduled Spiriva & Effexor--> prn  Reported as Not Taking:  None  Removed:  Non3    Actions taken by pharmacist (provider contacted, etc): None     Additional medication history information: Pt was started on 7 days course of Predisone on 2/8/2023.    Medication reconciliation/reorder completed by provider prior to medication history?   No     Prior to Admission medications    Medication Sig Last Dose Taking? Auth Provider Long Term End Date   albuterol (PROAIR HFA/PROVENTIL HFA/VENTOLIN HFA) 108 (90 Base) MCG/ACT inhaler Inhale 2 puffs into the lungs every 4 hours as needed for shortness of breath / dyspnea or wheezing Inhale 1-2 Puffs every 4 hours as needed for Wheezing. prn Yes Mark Olsen, DO Yes    albuterol (PROVENTIL) (2.5 MG/3ML) 0.083% neb solution Take 1 vial (2.5 mg) by nebulization every 4 hours as needed for shortness of breath / dyspnea or wheezing prn Yes Mark Olsen, DO Yes    atorvastatin (LIPITOR) 20 MG tablet Take 20 mg by mouth daily 2/10/2023 at am Yes Reported, Patient Yes    clonazePAM (KLONOPIN) 0.5 MG tablet Take 0.5 mg by mouth daily 2/10/2023 Yes Unknown, Entered By History Yes    fluticasone-salmeterol (ADVAIR-HFA) 230-21 MCG/ACT inhaler Inhale 2 puffs into the lungs 2 times daily 2/10/2023 Yes Mark Olsen, DO Yes    guaiFENesin (MUCINEX) 600 MG 12 hr tablet Take 1 tablet (600 mg) by mouth 2 times daily  Patient taking differently: Take 1,200 mg by mouth 2 times  daily 2/10/2023 Yes Mark Olsen,      ipratropium - albuterol 0.5 mg/2.5 mg/3 mL (DUONEB) 0.5-2.5 (3) MG/3ML neb solution Take 1 vial (3 mLs) by nebulization every 6 hours as needed for shortness of breath / dyspnea or wheezing prn Yes Morales Alva MD Yes    levothyroxine (SYNTHROID/LEVOTHROID) 112 MCG tablet Take 112 mcg by mouth daily 2/10/2023 Yes Reported, Patient Yes    losartan (COZAAR) 25 MG tablet Take 1 tablet (25 mg) by mouth daily 2/10/2023 Yes Kody Prieto,  Yes    PARoxetine (PAXIL) 20 MG tablet Take 20 mg by mouth daily 2/10/2023 Yes Unknown, Entered By History No    predniSONE (DELTASONE) 10 MG tablet 4 tabs daily for 2 days, then 3 tabs daily for 2 days, then 2 tabs daily for 2 days, then 1 tab daily for 2 days, then stop 2/10/2023 Yes Suresh Jeter MD     tiotropium (SPIRIVA RESPIMAT) 2.5 MCG/ACT inhaler Inhale 2 puffs into the lungs daily as needed prn Yes Unknown, Entered By History Yes    venlafaxine (EFFEXOR XR) 37.5 MG 24 hr capsule Take 37.5 mg by mouth daily as needed prn Yes Unknown, Entered By History Yes

## 2023-02-11 NOTE — PROGRESS NOTES
Pt received up in ICU from ED on BIPAP.    Currently off BIPAP and on 3LNC, sat 97-98%, RR 17-22, BS-scatter exp wheezing.  Received Xopenex neb qid as ordered. Tolerated it well.    RT to follow and monitor resp status for BIPAP needs.

## 2023-02-11 NOTE — ED NOTES
Writer cleaned pt up and placed new adult diaper. Writer applied purewick using standard protocol.

## 2023-02-11 NOTE — PROGRESS NOTES
RT Note:    A BiPAP of 12/6 @ 30% was applied to the pt via the mask for an increase in WOB and/or SOB. Skin integrity on bridge of nose is clean, dry, and intact. Patient is tolerating BiPAP well. Received albuterol nebulizer x2 inline with BiPAP.     Update, 12:30: Patient transported from ER to CT on BiPAP without complications. FiO2 weaned to 25%.  Update, 16:00: Patient transported from ER to ICU on BiPAP

## 2023-02-11 NOTE — H&P
[facility]    History and Physical  Hospitalist       Date of Admission:  2/11/2023    Assessment & Plan   Lara Melendez is a 57 year old female with a past medical history of COPD with multiple recent exacerbations last in December 2022 currently using 1 to 2 L at baseline, anxiety, hypertension, hyperlipidemia, hypothyroidism who presents with shortness of breath and cough.    #Acute hypoxemic respiratory failure secondary to COPD exacerbation: Patient notes that she has had progressive shortness of breath over the last week.  She notes specifically she has had shortness of breath over the last 1 to 2 days.  She has also noted increased cough with clear to yellow sputum production over this period of time.  She has been using nebulizer at home without much relief.  She denies chest pain or discomfort.  No fevers but has noted some sinus congestion preceding her worsening shortness of breath.  She denies any unilateral leg swelling.  She notes that her symptoms feel similar to prior COPD exacerbations.  No nausea vomiting.  No known sick contacts.  -On EMS arrival patient was saturating 78% and placed on positive pressure.  -ER, patient afebrile and tachycardic into the 1 teens.  Normotensive to hypertensive.  Placed on BiPAP for work of breathing.  Initial VBG 7.29/46.  Repeat VBG 7.37/47.  BMP unremarkable.  Lactic acid initially elevated at 3.5 but normalized with some fluid and BiPAP therapy.  Patient given Methylpred, magnesium sulfate and nebulizer.  Her D-dimer was positive and CT PE is in process.  -We will continue Methylpred 40 mg every 8 hours.  Xopenex given tachycardia.  Continue her home inhalers.  We will continue BiPAP but patient does seem conversational and VBG does not show significant hypercarbia.  Suspect we will be able to wean BiPAP.  Once able to tolerate being off BiPAP for a couple hours can give diet.  -Hold antibiotics given negative procalcitonin and no  infiltrate on chest x-ray.  -Follow-up CT PE given positive D-dimer.  If evidence of PE start heparin drip.    #Sinus tachycardia: Heart rate elevated on arrival which is secondary to COPD exacerbation.  EKG with sinus tachycardia.  Noted during prior admission.  Xopenex as above.  Treatment of COPD exacerbation as above.    #Hypothyroidism: Continue LT4  #HTN: Continue home losartan  #Anxiety: Continue effexor.  Home low-dose clonazepam also ordered.    DVT Prophylaxis: Enoxaparin (Lovenox) SQ  Code Status: Full Code  Dispo: Admit to ICU.     Discussed with on-call intensivist.    Tommie Ren MD    Primary Care Physician   Chyna Rust    Chief Complaint   SOB, cough    History is obtained from the patient, patient's chart discussed with ER physician    History of Present Illness   Lara Melendez is a 57 year old female with a past medical history of COPD with multiple recent exacerbations last in December 2022 currently using 1 to 2 L at baseline, anxiety, hypertension, hyperlipidemia, hypothyroidism who presents with shortness of breath and cough.    Patient notes that she has had progressive shortness of breath over the last week.  She notes specifically she has had shortness of breath over the last 1 to 2 days.  She has also noted increased cough with clear to yellow sputum production over this period of time.  She has been using nebulizer at home without much relief.  She denies chest pain or discomfort.  No fevers but has noted some sinus congestion preceding her worsening shortness of breath.  She denies any unilateral leg swelling.  She notes that her symptoms feel similar to prior COPD exacerbations.  No nausea vomiting.  No known sick contacts.    In the ER, patient afebrile and tachycardic into the 1 teens.  Normotensive to hypertensive.  Placed on BiPAP for work of breathing.  Initial VBG 7.29/46.  Repeat VBG 7.37/47.  BMP unremarkable.  Lactic acid initially elevated at 3.5 but normalized with  some fluid and BiPAP therapy.  Patient given Methylpred, magnesium sulfate and nebulizer.  Her D-dimer was positive and CT PE is in process.    Past Medical History    I have reviewed this patient's medical history and updated it with pertinent information if needed.   Past Medical History:   Diagnosis Date     Anxiety      COPD (chronic obstructive pulmonary disease)      Hypercholesterolemia      Hypertension      Hypothyroidism        Past Surgical History   I have reviewed this patient's surgical history and updated it with pertinent information if needed.  Past Surgical History:   Procedure Laterality Date     CHOLECYSTECTOMY       INCISION AND DRAINAGE MANDIBLE, COMBINED Left 01/10/2022    Procedure: INCISION AND DRAINAGE, MANDIBLE;  Surgeon: Jn Feliz DDS;  Location: [facility] OR     INCISION AND DRAINAGE MANDIBLE, COMBINED Left 02/04/2022    Procedure: INCISION AND DRAINAGE, MANDIBLE;  Surgeon: Taylor Ortez DDS;  Location: [facility] OR     TOOTH EXTRACTION         Prior to Admission Medications   Prior to Admission Medications   Prescriptions Last Dose Informant Patient Reported? Taking?   albuterol (PROAIR HFA/PROVENTIL HFA/VENTOLIN HFA) 108 (90 Base) MCG/ACT inhaler   No No   Sig: Inhale 2 puffs into the lungs every 4 hours as needed for shortness of breath / dyspnea or wheezing Inhale 1-2 Puffs every 4 hours as needed for Wheezing.   albuterol (PROVENTIL) (2.5 MG/3ML) 0.083% neb solution   No No   Sig: Take 1 vial (2.5 mg) by nebulization every 4 hours as needed for shortness of breath / dyspnea or wheezing   atorvastatin (LIPITOR) 20 MG tablet  Self Yes No   Sig: Take 20 mg by mouth daily   clonazePAM (KLONOPIN) 0.5 MG tablet   Yes No   Sig: Take 0.25 mg by mouth 2 times daily as needed for anxiety   fluticasone-salmeterol (ADVAIR-HFA) 230-21 MCG/ACT inhaler   No No   Sig: Inhale 2 puffs into the lungs 2 times daily   guaiFENesin (MUCINEX) 600 MG 12 hr tablet   No No   Sig: Take 1 tablet (600 mg) by mouth 2  times daily   ipratropium - albuterol 0.5 mg/2.5 mg/3 mL (DUONEB) 0.5-2.5 (3) MG/3ML neb solution   No No   Sig: Take 1 vial (3 mLs) by nebulization every 6 hours as needed for shortness of breath / dyspnea or wheezing   levothyroxine (SYNTHROID/LEVOTHROID) 112 MCG tablet  Self Yes No   Sig: Take 112 mcg by mouth daily   losartan (COZAAR) 25 MG tablet   No No   Sig: Take 1 tablet (25 mg) by mouth daily   predniSONE (DELTASONE) 10 MG tablet   No No   Si tabs daily for 2 days, then 3 tabs daily for 2 days, then 2 tabs daily for 2 days, then 1 tab daily for 2 days, then stop   tiotropium (SPIRIVA RESPIMAT) 2.5 MCG/ACT inhaler   Yes No   Sig: Inhale 2 puffs into the lungs daily   venlafaxine (EFFEXOR XR) 37.5 MG 24 hr capsule   Yes No   Sig: Take 37.5 mg by mouth daily      Facility-Administered Medications: None     Allergies   Allergies   Allergen Reactions     Sulfa Drugs      Penicillins Rash     Generalized rash  Rash, Generalized         Social History   I have reviewed this patient's social history and updated it with pertinent information if needed. Lara Melendez  reports that she has never smoked. She has never used smokeless tobacco. She reports current alcohol use. She reports that she does not use drugs.    Family History   I have reviewed this patient's family history and updated it with pertinent information if needed.   Family History   Problem Relation Age of Onset     Deep Vein Thrombosis (DVT) Mother      Hypertension Mother        Review of Systems   The 10 point Review of Systems is negative other than noted in the HPI or here.     Physical Exam   Temp: 98.7  F (37.1  C) Temp src: Axillary BP: (!) 152/96 Pulse: 115   Resp: 15 SpO2: 97 % O2 Device: BiPAP/CPAP    Vital Signs with Ranges  Temp:  [98.7  F (37.1  C)] 98.7  F (37.1  C)  Pulse:  [112-142] 115  Resp:  [15-30] 15  BP: (133-152)/() 152/96  FiO2 (%):  [30 %] 30 %  SpO2:  [92 %-99 %] 97 %  0 lbs 0 oz    Constitutional: BiPAP in  place.  No acute distress.  Conversational.  HEENT: Normocephalic, BiPAP in place.  No elevation of JVD noted.  Respiratory: She has tachypnea with BiPAP in place.  She is conversational.  She has bilateral expiratory wheezing noted with prolonged expiratory phase.  No rhonchi.  Cardiovascular: Tachycardic, regular, no murmur  GI: BS+, NT, ND, no rebound or guarding  Lymph/Hematologic: No bruising. No cervical LAD  Skin: No rash  Musculoskeletal: Nl Tone, No edema noted  Neurologic: A&Ox3, Answers appropriately. CNII-XII intact. Moves all extremities. No tremor  Psychiatric: Calm    Data   Data reviewed today:  I personally reviewed   Recent Labs   Lab 02/11/23  0831   WBC 6.3   HGB 12.9   *         POTASSIUM 5.0   CHLORIDE 102   CO2 22   BUN 7.1*   CR 0.57   ANIONGAP 14   EDIN 9.2   *       Recent Results (from the past 24 hour(s))   XR Chest Port 1 View    Narrative    EXAM: XR CHEST PORTABLE 1 VIEW  LOCATION: [facility]  DATE/TIME: 02/11/2023, 8:36 AM    INDICATION: Shortness of breath.  COMPARISON: None available.      Impression    IMPRESSION: Single AP view of the chest was obtained. Cardiomediastinal silhouette is within normal limits. No suspicious focal pulmonary opacities. No significant pleural effusion or pneumothorax.             Clinically Significant Risk Factors Present on Admission                  # Hypertension: home medication list includes antihypertensive(s)   # Non-Invasive mechanical ventilation: current O2 Device: BiPAP/CPAP  # Acute hypoxic respiratory failure: continue supplemental O2 as needed

## 2023-02-11 NOTE — ED TRIAGE NOTES
Pt arrives via EMS with COPD exacerbation. Pt took 4 nebs at home prior to calling EMS, pt was found at 78% on RA. In route pt received an albuterol neb. Pt had noted increased work of breathing, tachypnea and tachycardia when EMS arrived pt was placed on CPAP by EMS. One arrival pt continued to have noted increased work of breathing and was switched to BiPAP. ABCs intact and AOx4. Pt has noted improvements with BiPAP in place currently.      Triage Assessment       Row Name 02/11/23 0839       Triage Assessment (Adult)    Airway WDL WDL       Respiratory WDL    Respiratory WDL X;all    Rhythm/Pattern, Respiratory shortness of breath;tachypneic;nasal flaring;pursed lip breathing    Expansion/Accessory Muscles/Retractions abdominal muscle use;accessory muscle use       Cardiac WDL    Cardiac WDL X       Chest Pain Assessment    Associated Signs/Symptoms hypertension;anxiety;tachycardia    Chest Pain Intervention cardiac biomarkers drawn;cardiac monitoring continued;cardiac monitor placed;12-lead ECG obtained

## 2023-02-11 NOTE — ED NOTES
Bed: ED32  Expected date: 2/11/23  Expected time:   Means of arrival: Ambulance  Comments:  Jonna [email or web address]

## 2023-02-11 NOTE — ED PROVIDER NOTES
History     Chief Complaint:  Shortness of Breath       HPI   Lara Melendez is a 57 year old female with a history of COPD and hypertension who presents via EMS with shortness of breath. She has had a nonproductive cough for the last couple of days and today had increasing shortness of breath. She used 4 nebulizers at home prior to EMS arrival. She uses 1-2L of oxygen at baseline. EMS reports that she was 78% on their arrival with heart rate of 170. After placing the patient on BIPAP and duoneb administration Sp02 was 98% and heart rate 130. EMS also gave aspirin 324 mg and nitro en route. They also report of systolic blood pressure in 180s. She has never been intubated. She denies history of cancer or blood clots. Denies fever, chest pain, nausea, vomiting or abdominal pain.    Independent Historian:    EMS    Review of External Notes: 12/1/22 hospital admission for COPD    ROS:  Review of Systems   Constitutional: Negative for fever.   Respiratory: Positive for cough and shortness of breath.    Cardiovascular: Negative for chest pain.   Gastrointestinal: Negative for abdominal pain, nausea and vomiting.   All other systems reviewed and are negative.      Allergies:  Sulfa Drugs  Penicillins     Medications:    Albuterol  Lipitor  Klonopin  Advair  Mucinex  Duoneb  Synthroid  Cozaar  Deltasone  Spiriva  Effexor    Past Medical History:    Anxiety  COPD  Hypercholesterolemia  Hypertension  Hypothyroidism   Tobacco use disorder  Psoriasis  Diverticulitis of colon    Past Surgical History:    Cholecystectomy  Incision and drainage mandible, left x2  Tooth extraction    Family History:    Mother - DVT, hypertension    Social History:  The patient presents to the ED via EMS alone.  PCP: Chyna Rust     Physical Exam     Patient Vitals for the past 24 hrs:   BP Temp Temp src Pulse Resp SpO2   02/11/23 1248 (!) 140/92 -- -- 115 19 97 %   02/11/23 1233 (!) 145/90 -- -- 116 17 98 %   02/11/23 1231 (!) 145/90 --  -- 116 16 98 %   02/11/23 1203 (!) 146/113 -- -- 115 16 97 %   02/11/23 1148 (!) 151/96 -- -- 115 15 95 %   02/11/23 1133 (!) 151/97 -- -- 114 15 95 %   02/11/23 1118 (!) 148/95 -- -- 114 16 97 %   02/11/23 1100 (!) 152/96 -- -- 115 15 97 %   02/11/23 1056 -- -- -- 117 16 97 %   02/11/23 1041 (!) 140/91 -- -- 118 16 97 %   02/11/23 1026 137/88 -- -- 115 16 92 %   02/11/23 1025 -- -- -- 116 16 99 %   02/11/23 1010 136/85 -- -- 116 16 --   02/11/23 0958 -- -- -- 115 18 98 %   02/11/23 0955 135/81 -- -- 115 18 96 %   02/11/23 0940 (!) 143/100 -- -- 113 20 99 %   02/11/23 0925 (!) 137/92 -- -- 112 19 97 %   02/11/23 0923 -- -- -- 114 23 97 %   02/11/23 0917 -- 98.7  F (37.1  C) Axillary -- -- --   02/11/23 0910 (!) 133/90 -- -- (!) 121 20 93 %   02/11/23 0900 134/85 -- -- (!) 124 18 92 %   02/11/23 0850 -- -- -- (!) 128 23 93 %   02/11/23 0840 -- -- -- (!) 133 28 94 %   02/11/23 0838 -- -- -- (!) 133 30 93 %   02/11/23 0833 -- -- -- (!) 133 28 93 %   02/11/23 0832 -- -- -- (!) 129 30 --   02/11/23 0831 -- -- -- (!) 142 27 97 %   02/11/23 0830 (!) 150/109 -- -- (!) 141 -- 98 %        Physical Exam  Nursing note and vitals reviewed.  Constitutional: Well nourished. Moderate respiratory distress, on CPAP  Eyes: Conjunctiva normal.  Pupils are equal, round, and reactive to light.   ENT: Nose normal. Mucous membranes pink and moist.    Neck: Normal range of motion.  CVS: Sinus tachycardia.  Normal heart sounds.    Pulmonary: Lungs with diffuse expiratory wheezing  GI: Abdomen soft. Nontender, nondistended. No rigidity or guarding.    MSK: No calf tenderness or swelling.  Neuro: Alert. Follows simple commands.  Skin: Skin is warm and dry. No rash noted.   Psychiatric: Normal affect.       Emergency Department Course   ECG  ECG taken at 0843, ECG read at 0843  Sinus tachycardia. Nonspecific ST and T wave abnormality. Abnormal ECG.   Rate 129 bpm. AL interval 140 ms. QRS duration 70 ms. QT/QTc 314/460 ms. P-R-T axes 63 64  258.    Imaging:  CT Chest Pulmonary Embolism w Contrast   Final Result   IMPRESSION:      1.  No pulmonary embolism or other acute intrathoracic abnormality.      2.  Mild upper lobe predominant emphysema.      3.  3 mm right upper lobe pulmonary nodule, slightly decreased in size since 10/26/2022.      XR Chest Port 1 View   Final Result   IMPRESSION: Single AP view of the chest was obtained. Cardiomediastinal silhouette is within normal limits. No suspicious focal pulmonary opacities. No significant pleural effusion or pneumothorax.                Report per radiology    Laboratory:  Labs Ordered and Resulted from Time of ED Arrival to Time of ED Departure   BASIC METABOLIC PANEL - Abnormal       Result Value    Sodium 138      Potassium 5.0      Chloride 102      Carbon Dioxide (CO2) 22      Anion Gap 14      Urea Nitrogen 7.1 (*)     Creatinine 0.57      Calcium 9.2      Glucose 162 (*)     GFR Estimate >90     CBC WITH PLATELETS AND DIFFERENTIAL - Abnormal    WBC Count 6.3      RBC Count 3.79 (*)     Hemoglobin 12.9      Hematocrit 39.7       (*)     MCH 34.0 (*)     MCHC 32.5      RDW 13.3      Platelet Count 187      % Neutrophils 77      % Lymphocytes 14      % Monocytes 7      % Eosinophils 1      % Basophils 0      % Immature Granulocytes 1      NRBCs per 100 WBC 0      Absolute Neutrophils 4.9      Absolute Lymphocytes 0.9      Absolute Monocytes 0.4      Absolute Eosinophils 0.0      Absolute Basophils 0.0      Absolute Immature Granulocytes 0.1      Absolute NRBCs 0.0     ISTAT GASES LACTATE VENOUS POCT - Abnormal    Lactic Acid POCT 3.5 (*)     Bicarbonate Venous POCT 22      O2 Sat, Venous POCT 97      pCO2V Venous POCT 46      pH Venous POCT 7.29 (*)     pO2 Venous POCT 97 (*)    D DIMER QUANTITATIVE - Abnormal    D-Dimer Quantitative 1.16 (*)    ISTAT GASES LACTATE VENOUS POCT - Abnormal    Lactic Acid POCT 1.2      Bicarbonate Venous POCT 27      O2 Sat, Venous POCT 63 (*)     pCO2V Venous  POCT 47      pH Venous POCT 7.37      pO2 Venous POCT 34     TROPONIN T, HIGH SENSITIVITY - Normal    Troponin T, High Sensitivity 10     NT PROBNP INPATIENT - Normal    N terminal Pro BNP Inpatient 180     INFLUENZA A/B & SARS-COV2 PCR MULTIPLEX - Normal    Influenza A PCR Negative      Influenza B PCR Negative      RSV PCR Negative      SARS CoV2 PCR Negative     PROCALCITONIN - Normal    Procalcitonin 0.04     LACTIC ACID WHOLE BLOOD          Emergency Department Course & Assessments:             Interventions:  Medications   methylPREDNISolone sodium succinate (solu-MEDROL) injection 125 mg (125 mg Intravenous Given 2/11/23 0838)   albuterol (PROVENTIL) neb solution 2.5 mg (2.5 mg Nebulization Given 2/11/23 0837)   magnesium sulfate 2 g in water intermittent infusion (0 g Intravenous Stopped 2/11/23 0915)   0.9% sodium chloride BOLUS (0 mLs Intravenous Stopped 2/11/23 1000)   albuterol (PROVENTIL) neb solution 2.5 mg (2.5 mg Nebulization Given 2/11/23 0923)   sodium chloride for CT scan flush use (84 mLs Intravenous Given 2/11/23 1214)   iopamidol (ISOVUE-370) solution 500 mL (62 mLs Intravenous Given 2/11/23 1214)          Consultations/Discussion of Management or Tests:  1032  I spoke with Dr. Ren, hospitalist, who accepts the patient.       Social Determinants of Health affecting care:  Healthcare Access/Compliance       Assessments:  0821 I obtained history and examined the patient as noted above.  0827 The patient was placed on BIPAP.  0857 I rechecked the patient.  1035 I rechecked the patient and explained findings.    Disposition:  The patient was admitted to the hospital under the care of Dr. Ren.     Impression & Plan      Medical Decision Making:  Patient is a 57-year-old female with history of COPD on home O2 presenting with acute respiratory distress.  She is ill-appearing on arrival, transition from CPAP to BiPAP on arrival which she reports significant improvement from.  EKG without STEMI.   High-sensitivity screening troponin negative.  She denies any chest pain and I doubt ACS.  She did undergo formal CT which is fortunately without evidence of PE, pneumonia, pneumothorax.  Incidental pulmonary nodule identified.  She was given breathing treatments in addition to IV steroids and IV magnesium.  Strong suspicion that patient's presentation is secondary to more COPD exacerbation.   Her initial lactate was elevated though I suspect this is more secondary to hypoxia.  Repeat much improved with oxygen support.No further indication for advanced airway support at this point in time.  She remained hemodynamically stable. Accepted to the ICU for admission.     Critical Care time:  was 35 minutes for this patient excluding procedures.    Diagnosis:    ICD-10-CM    1. COPD exacerbation (H)  J44.1       2. Acute respiratory failure with hypoxia (H)  J96.01       3. Pulmonary nodules  R91.8              Scribe Disclosure:  I, Danica Purcell, am serving as a scribe at 8:41 AM on 2/11/2023 to document services personally performed by Teresita Kenny DO based on my observations and the provider's statements to me.               Teresita Kenny DO  02/11/23 2337

## 2023-02-12 LAB
ANION GAP SERPL CALCULATED.3IONS-SCNC: 10 MMOL/L (ref 7–15)
BUN SERPL-MCNC: 7.7 MG/DL (ref 8–23)
CALCIUM SERPL-MCNC: 9 MG/DL (ref 8.6–10)
CHLORIDE SERPL-SCNC: 101 MMOL/L (ref 98–107)
CREAT SERPL-MCNC: 0.5 MG/DL (ref 0.51–0.95)
DEPRECATED HCO3 PLAS-SCNC: 26 MMOL/L (ref 22–29)
ERYTHROCYTE [DISTWIDTH] IN BLOOD BY AUTOMATED COUNT: 13.5 % (ref 10–15)
GFR SERPL CREATININE-BSD FRML MDRD: >90 ML/MIN/1.73M2
GLUCOSE BLDC GLUCOMTR-MCNC: 144 MG/DL (ref 70–99)
GLUCOSE BLDC GLUCOMTR-MCNC: 145 MG/DL (ref 70–99)
GLUCOSE SERPL-MCNC: 140 MG/DL (ref 70–99)
HCT VFR BLD AUTO: 38.6 % (ref 35–47)
HGB BLD-MCNC: 12.6 G/DL (ref 11.7–15.7)
MAGNESIUM SERPL-MCNC: 2.1 MG/DL (ref 1.7–2.3)
MCH RBC QN AUTO: 34.4 PG (ref 26.5–33)
MCHC RBC AUTO-ENTMCNC: 32.6 G/DL (ref 31.5–36.5)
MCV RBC AUTO: 106 FL (ref 78–100)
PLATELET # BLD AUTO: 142 10E3/UL (ref 150–450)
POTASSIUM SERPL-SCNC: 4.1 MMOL/L (ref 3.4–5.3)
RBC # BLD AUTO: 3.66 10E6/UL (ref 3.8–5.2)
SODIUM SERPL-SCNC: 137 MMOL/L (ref 136–145)
WBC # BLD AUTO: 5.5 10E3/UL (ref 4–11)

## 2023-02-12 PROCEDURE — 999N000157 HC STATISTIC RCP TIME EA 10 MIN

## 2023-02-12 PROCEDURE — 85027 COMPLETE CBC AUTOMATED: CPT | Performed by: HOSPITALIST

## 2023-02-12 PROCEDURE — 80048 BASIC METABOLIC PNL TOTAL CA: CPT | Performed by: HOSPITALIST

## 2023-02-12 PROCEDURE — 36415 COLL VENOUS BLD VENIPUNCTURE: CPT | Performed by: HOSPITALIST

## 2023-02-12 PROCEDURE — 250N000009 HC RX 250: Performed by: HOSPITALIST

## 2023-02-12 PROCEDURE — 250N000011 HC RX IP 250 OP 636: Performed by: HOSPITALIST

## 2023-02-12 PROCEDURE — 94640 AIRWAY INHALATION TREATMENT: CPT | Mod: 76

## 2023-02-12 PROCEDURE — 250N000013 HC RX MED GY IP 250 OP 250 PS 637: Performed by: HOSPITALIST

## 2023-02-12 PROCEDURE — 94640 AIRWAY INHALATION TREATMENT: CPT

## 2023-02-12 PROCEDURE — 99233 SBSQ HOSP IP/OBS HIGH 50: CPT | Performed by: HOSPITALIST

## 2023-02-12 PROCEDURE — 83735 ASSAY OF MAGNESIUM: CPT | Performed by: HOSPITALIST

## 2023-02-12 PROCEDURE — 200N000001 HC R&B ICU

## 2023-02-12 RX ORDER — VENLAFAXINE HYDROCHLORIDE 37.5 MG/1
37.5 CAPSULE, EXTENDED RELEASE ORAL DAILY PRN
Status: DISCONTINUED | OUTPATIENT
Start: 2023-02-13 | End: 2023-02-13 | Stop reason: HOSPADM

## 2023-02-12 RX ORDER — PAROXETINE 20 MG/1
20 TABLET, FILM COATED ORAL DAILY
Status: DISCONTINUED | OUTPATIENT
Start: 2023-02-12 | End: 2023-02-13 | Stop reason: HOSPADM

## 2023-02-12 RX ORDER — PREDNISONE 20 MG/1
40 TABLET ORAL DAILY
Status: DISCONTINUED | OUTPATIENT
Start: 2023-02-13 | End: 2023-02-13 | Stop reason: HOSPADM

## 2023-02-12 RX ADMIN — LEVOTHYROXINE SODIUM 112 MCG: 112 TABLET ORAL at 09:08

## 2023-02-12 RX ADMIN — LEVALBUTEROL HYDROCHLORIDE 1.25 MG: 1.25 SOLUTION RESPIRATORY (INHALATION) at 16:03

## 2023-02-12 RX ADMIN — METHYLPREDNISOLONE SODIUM SUCCINATE 40 MG: 40 INJECTION, POWDER, FOR SOLUTION INTRAMUSCULAR; INTRAVENOUS at 00:17

## 2023-02-12 RX ADMIN — ALBUTEROL SULFATE 2.5 MG: 2.5 SOLUTION RESPIRATORY (INHALATION) at 03:55

## 2023-02-12 RX ADMIN — LOSARTAN POTASSIUM 25 MG: 25 TABLET, FILM COATED ORAL at 09:08

## 2023-02-12 RX ADMIN — ACETAMINOPHEN 650 MG: 325 TABLET ORAL at 20:56

## 2023-02-12 RX ADMIN — LEVALBUTEROL HYDROCHLORIDE 1.25 MG: 1.25 SOLUTION RESPIRATORY (INHALATION) at 11:42

## 2023-02-12 RX ADMIN — GUAIFENESIN 600 MG: 600 TABLET, EXTENDED RELEASE ORAL at 20:57

## 2023-02-12 RX ADMIN — CLONAZEPAM 0.25 MG: 0.25 TABLET, ORALLY DISINTEGRATING ORAL at 09:23

## 2023-02-12 RX ADMIN — METHYLPREDNISOLONE SODIUM SUCCINATE 40 MG: 40 INJECTION, POWDER, FOR SOLUTION INTRAMUSCULAR; INTRAVENOUS at 16:18

## 2023-02-12 RX ADMIN — PAROXETINE HYDROCHLORIDE 20 MG: 20 TABLET, FILM COATED ORAL at 10:37

## 2023-02-12 RX ADMIN — GUAIFENESIN 600 MG: 600 TABLET, EXTENDED RELEASE ORAL at 09:08

## 2023-02-12 RX ADMIN — VENLAFAXINE HYDROCHLORIDE 37.5 MG: 37.5 CAPSULE, EXTENDED RELEASE ORAL at 09:08

## 2023-02-12 RX ADMIN — NICOTINE 1 PATCH: 14 PATCH, EXTENDED RELEASE TRANSDERMAL at 09:03

## 2023-02-12 RX ADMIN — FLUTICASONE FUROATE AND VILANTEROL TRIFENATATE 1 PUFF: 200; 25 POWDER RESPIRATORY (INHALATION) at 08:28

## 2023-02-12 RX ADMIN — ATORVASTATIN CALCIUM 20 MG: 20 TABLET, FILM COATED ORAL at 09:08

## 2023-02-12 RX ADMIN — METHYLPREDNISOLONE SODIUM SUCCINATE 40 MG: 40 INJECTION, POWDER, FOR SOLUTION INTRAMUSCULAR; INTRAVENOUS at 09:08

## 2023-02-12 RX ADMIN — LEVALBUTEROL HYDROCHLORIDE 1.25 MG: 1.25 SOLUTION RESPIRATORY (INHALATION) at 08:27

## 2023-02-12 RX ADMIN — LEVALBUTEROL HYDROCHLORIDE 1.25 MG: 1.25 SOLUTION RESPIRATORY (INHALATION) at 19:38

## 2023-02-12 RX ADMIN — UMECLIDINIUM 1 PUFF: 62.5 AEROSOL, POWDER ORAL at 08:28

## 2023-02-12 ASSESSMENT — ACTIVITIES OF DAILY LIVING (ADL)
ADLS_ACUITY_SCORE: 24

## 2023-02-12 NOTE — PROGRESS NOTES
Pt remains off BIPAP. BIPAP stby in RM if needed.   Remains on 2LNC, sat in the high 90's, RR 18-22, BS-exp wheezing.  Received scheduled nebs as order.  RT to follow.

## 2023-02-12 NOTE — PLAN OF CARE
A&Ox4. Independent with cares in the room. POC reviewed with pt. See Flowsheet charting. Transfer to floor.

## 2023-02-12 NOTE — PROGRESS NOTES
[facility]    Hospitalist Progress Note      Assessment & Plan   Lara Melendez is a 57 year old female with a past medical history of COPD with multiple recent exacerbations last in December 2022 currently using 1 to 2 L at night at baseline, anxiety, hypertension, hyperlipidemia, hypothyroidism who presents with shortness of breath and cough.     #Acute hypoxemic respiratory failure secondary to COPD exacerbation: Patient notes that she has had progressive shortness of breath over the last week.  She notes specifically she has had shortness of breath over the last 1 to 2 days.  She has also noted increased cough with clear to yellow sputum production over this period of time.  She has been using nebulizer at home without much relief.  She denies chest pain or discomfort.  No fevers but has noted some sinus congestion preceding her worsening shortness of breath.  She denies any unilateral leg swelling.  She notes that her symptoms feel similar to prior COPD exacerbations.  No nausea vomiting.  No known sick contacts.  -On EMS arrival patient was saturating 78% and placed on positive pressure.  -ER, patient afebrile and tachycardic into the 1 teens.  Normotensive to hypertensive.  Placed on BiPAP for work of breathing.  Initial VBG 7.29/46.  Repeat VBG 7.37/47.  BMP unremarkable.  Lactic acid initially elevated at 3.5 but normalized with some fluid and BiPAP therapy.  Patient given Methylpred, magnesium sulfate and nebulizer.  Her D-dimer was positive and CT PE is negative for PE.  -Patient has shown significant improvement over the last 24 hours.  She is moving air better.  She has been weaned off BiPAP and is on 3 L via nasal cannula and saturating in the high 90s.  We can wean her oxygen as tolerated to maintain sats really no higher than 90%.  She tells me that she uses 1-1/2 L only at night.  -Transition oral prednisone tomorrow.  Continue Xopenex.  Continue home inhalers.  Wean oxygen  as able.  -Hold antibiotics given negative procalcitonin and no infiltrate on chest x-ray.  -Follow-up CT PE given positive D-dimer.  If evidence of PE start heparin drip.     #Sinus tachycardia: Heart rate elevated on arrival which is secondary to COPD exacerbation.  EKG with sinus tachycardia.  Noted during prior admission.  Xopenex as above.  Treatment of COPD exacerbation as above.  This has improved over the last 24 hours as well.    #Tobacco use d/o: Advised cessation.  We discussed the critical importance of smoking cessation and the prevention of advancement of COPD.  She did voiced understanding.  Nicotine patch ordered.     #Hypothyroidism: Continue LT4  #HTN: Continue home losartan  #Anxiety: Continue effexor.  Home low-dose clonazepam also ordered.     DVT Prophylaxis: Enoxaparin (Lovenox) SQ  Code Status: Full Code  Dispo: Transfer out of ICU. Possibly discharge tomorrow if does well over next 24 hours.     I did update , Merrill, by phone.     Tommie Ren MD    Interval History   No events.  Feels better.  Denies pain.  Weaned off BiPAP.  Still with cough.  Remains on a bit of supplemental oxygen but saturating in the high 90s.    -Data reviewed today: I reviewed all new labs and imaging results over the last 24 hours    Physical Exam   Temp: 98.5  F (36.9  C) Temp src: Oral BP: (!) 140/92 Pulse: 81   Resp: 20 SpO2: 97 % O2 Device: Nasal cannula Oxygen Delivery: 3 LPM  Vitals:    02/11/23 1613   Weight: 60 kg (132 lb 4.4 oz)     Vital Signs with Ranges  Temp:  [98  F (36.7  C)-98.7  F (37.1  C)] 98.5  F (36.9  C)  Pulse:  [] 81  Resp:  [15-35] 20  BP: (121-164)/() 140/92  FiO2 (%):  [25 %-30 %] 25 %  SpO2:  [92 %-100 %] 97 %  I/O last 3 completed shifts:  In: 2370 [P.O.:1320; IV Piggyback:1050]  Out: -     Constitutional: Nontoxic, NAD  HEENT: Normocephalic. MMM, No elevation of JVD noted.   Respiratory: Improved tachypnea, bilateral expiratory wheeze still noted but moving air better.   No rhonchi.  Cardiovascular: Improved tachycardia, regular, no murmur  GI: BS+, NT, ND  Skin/Integumen: WWP, no rash. No edema  Neuro: CNII-XII intact. Moves all extremities. No tremor. A&Ox3.    Medications       atorvastatin  20 mg Oral Daily     enoxaparin ANTICOAGULANT  40 mg Subcutaneous Q24H     fluticasone-vilanterol  1 puff Inhalation Daily     guaiFENesin  600 mg Oral BID     levalbuterol  1.25 mg Nebulization 4x daily     levothyroxine  112 mcg Oral Daily     losartan  25 mg Oral Daily     methylPREDNISolone  40 mg Intravenous Q8H     nicotine  1 patch Transdermal Daily     nicotine   Transdermal Q8H     [START ON 2/13/2023] predniSONE  40 mg Oral Daily     umeclidinium  1 puff Inhalation Daily     venlafaxine  37.5 mg Oral Daily       Data   Recent Labs   Lab 02/12/23  0534 02/12/23  0440 02/11/23  2358 02/11/23  1953 02/11/23  1646 02/11/23  1616 02/11/23  0831   WBC 5.5  --   --   --   --   --  6.3   HGB 12.6  --   --   --   --   --  12.9   *  --   --   --   --   --  105*   *  --   --   --   --   --  187     --   --   --   --   --  138   POTASSIUM 4.1  --   --   --   --   --  5.0   CHLORIDE 101  --   --   --   --   --  102   CO2 26  --   --   --   --   --  22   BUN 7.7*  --   --   --   --   --  7.1*   CR 0.50*  --   --   --  0.55  --  0.57   ANIONGAP 10  --   --   --   --   --  14   EDIN 9.0  --   --   --   --   --  9.2   * 145* 144*   < >  --    < > 162*    < > = values in this interval not displayed.       Recent Results (from the past 24 hour(s))   CT Chest Pulmonary Embolism w Contrast    Narrative    EXAM: CT CHEST PULMONARY EMBOLISM W CONTRAST  LOCATION: [facility]  DATE/TIME: 2/11/2023 12:25 PM    INDICATION: Shortness of breath.  COMPARISON: CT chest 10/26/2022.  TECHNIQUE: CT chest pulmonary angiogram during arterial phase injection of IV contrast. Multiplanar reformats and MIP reconstructions were performed. Dose reduction techniques were used.    CONTRAST: 62mL Isovue 370    FINDINGS:  ANGIOGRAM CHEST: Pulmonary arteries are normal caliber and negative for pulmonary emboli. Thoracic aorta is negative for dissection. Mild atherosclerotic plaque throughout the thoracic aorta. No CT evidence of right heart strain.    LUNGS AND PLEURA: Mild upper lobe predominant centrilobular emphysema. Chronic calcified mucous plugging within the left upper lobe and associated scarring is unchanged. Mild diffuse chronic bronchial wall thickening with scattered areas of mucous   plugging within the distal airways. No new airspace opacities. A 3 mm right apical nodule was previously 4 mm (7/#50). No new or growing pulmonary nodules.    MEDIASTINUM/AXILLAE: Normal cardiac size. No pericardial effusion. No enlarged thoracic lymph node.    CORONARY ARTERY CALCIFICATION: Mild.    UPPER ABDOMEN: Status post cholecystectomy. Slight nodularity of the liver surface, suggesting chronic hepatocellular disease.    MUSCULOSKELETAL: No acute bony abnormality.      Impression    IMPRESSION:    1.  No pulmonary embolism or other acute intrathoracic abnormality.    2.  Mild upper lobe predominant emphysema.    3.  3 mm right upper lobe pulmonary nodule, slightly decreased in size since 10/26/2022.

## 2023-02-12 NOTE — PLAN OF CARE
Goal Outcome Evaluation:      Plan of Care Reviewed With: patient              ICU End of Shift Summary.  For vital signs and complete assessments, please see documentation flowsheets.      Pertinent assessments:   Neuro: A+O X4, able to make needs known  Cardiac:ST on pms SR rest of night  Resp:BLUE when ambulated to BR otherwise no SOB good sats on 3L NC  GI:WDL  : WDL  Skin:No issues  Lines: SLX2  Drips:0    Major Shift Events: Uneventful night    Plan (Upcoming Events): Tx to floor,increase activity as tolerated  Discharge/Transfer Needs: TBD     Bedside Shift Report Completed : yes  Bedside Safety Check Completed: yes

## 2023-02-13 VITALS
BODY MASS INDEX: 22.58 KG/M2 | WEIGHT: 132.28 LBS | DIASTOLIC BLOOD PRESSURE: 92 MMHG | OXYGEN SATURATION: 96 % | TEMPERATURE: 98.1 F | RESPIRATION RATE: 20 BRPM | SYSTOLIC BLOOD PRESSURE: 162 MMHG | HEIGHT: 64 IN | HEART RATE: 85 BPM

## 2023-02-13 LAB
ATRIAL RATE - MUSE: 129 BPM
DIASTOLIC BLOOD PRESSURE - MUSE: NORMAL MMHG
GLUCOSE BLDC GLUCOMTR-MCNC: 90 MG/DL (ref 70–99)
INTERPRETATION ECG - MUSE: NORMAL
P AXIS - MUSE: 63 DEGREES
PR INTERVAL - MUSE: 140 MS
QRS DURATION - MUSE: 70 MS
QT - MUSE: 314 MS
QTC - MUSE: 460 MS
R AXIS - MUSE: 64 DEGREES
SYSTOLIC BLOOD PRESSURE - MUSE: NORMAL MMHG
T AXIS - MUSE: 258 DEGREES
VENTRICULAR RATE- MUSE: 129 BPM

## 2023-02-13 PROCEDURE — 250N000013 HC RX MED GY IP 250 OP 250 PS 637: Performed by: HOSPITALIST

## 2023-02-13 PROCEDURE — 94640 AIRWAY INHALATION TREATMENT: CPT

## 2023-02-13 PROCEDURE — 250N000012 HC RX MED GY IP 250 OP 636 PS 637: Performed by: HOSPITALIST

## 2023-02-13 PROCEDURE — 99239 HOSP IP/OBS DSCHRG MGMT >30: CPT | Performed by: HOSPITALIST

## 2023-02-13 PROCEDURE — 250N000009 HC RX 250: Performed by: HOSPITALIST

## 2023-02-13 PROCEDURE — 94640 AIRWAY INHALATION TREATMENT: CPT | Mod: 76

## 2023-02-13 PROCEDURE — 999N000157 HC STATISTIC RCP TIME EA 10 MIN

## 2023-02-13 RX ORDER — PREDNISONE 20 MG/1
40 TABLET ORAL DAILY
Qty: 8 TABLET | Refills: 0 | Status: SHIPPED | OUTPATIENT
Start: 2023-02-13 | End: 2023-02-17

## 2023-02-13 RX ORDER — ALBUTEROL SULFATE 0.83 MG/ML
2.5 SOLUTION RESPIRATORY (INHALATION) EVERY 4 HOURS PRN
Qty: 30 ML | Refills: 0 | Status: ON HOLD | OUTPATIENT
Start: 2023-02-13 | End: 2024-01-10

## 2023-02-13 RX ADMIN — FLUTICASONE FUROATE AND VILANTEROL TRIFENATATE 1 PUFF: 200; 25 POWDER RESPIRATORY (INHALATION) at 07:17

## 2023-02-13 RX ADMIN — GUAIFENESIN 600 MG: 600 TABLET, EXTENDED RELEASE ORAL at 08:04

## 2023-02-13 RX ADMIN — LEVALBUTEROL HYDROCHLORIDE 1.25 MG: 1.25 SOLUTION RESPIRATORY (INHALATION) at 11:50

## 2023-02-13 RX ADMIN — PREDNISONE 40 MG: 20 TABLET ORAL at 08:04

## 2023-02-13 RX ADMIN — ACETAMINOPHEN 650 MG: 325 TABLET ORAL at 08:04

## 2023-02-13 RX ADMIN — PAROXETINE HYDROCHLORIDE 20 MG: 20 TABLET, FILM COATED ORAL at 08:04

## 2023-02-13 RX ADMIN — ATORVASTATIN CALCIUM 20 MG: 20 TABLET, FILM COATED ORAL at 08:04

## 2023-02-13 RX ADMIN — NICOTINE 1 PATCH: 14 PATCH, EXTENDED RELEASE TRANSDERMAL at 08:05

## 2023-02-13 RX ADMIN — LEVALBUTEROL HYDROCHLORIDE 1.25 MG: 1.25 SOLUTION RESPIRATORY (INHALATION) at 07:15

## 2023-02-13 RX ADMIN — LEVOTHYROXINE SODIUM 112 MCG: 112 TABLET ORAL at 08:05

## 2023-02-13 RX ADMIN — UMECLIDINIUM 1 PUFF: 62.5 AEROSOL, POWDER ORAL at 07:17

## 2023-02-13 RX ADMIN — LOSARTAN POTASSIUM 25 MG: 25 TABLET, FILM COATED ORAL at 08:04

## 2023-02-13 RX ADMIN — CLONAZEPAM 0.25 MG: 0.25 TABLET, ORALLY DISINTEGRATING ORAL at 08:04

## 2023-02-13 ASSESSMENT — ACTIVITIES OF DAILY LIVING (ADL)
ADLS_ACUITY_SCORE: 24
ADLS_ACUITY_SCORE: 22
ADLS_ACUITY_SCORE: 24
ADLS_ACUITY_SCORE: 22
ADLS_ACUITY_SCORE: 24

## 2023-02-13 NOTE — PLAN OF CARE
Goal Outcome Evaluation:      Plan of Care Reviewed With: patient    Overall Patient Progress: improvingOverall Patient Progress: improving  A+O X4 able to make needs known.No c/o pain or SOB

## 2023-02-13 NOTE — PROGRESS NOTES
Pt ambulating in hallway today with O2 monitoring. Lowest saturation was 88% but only for a brief moment.  Recovered easily from walk.  States she feels so much better.  Only slightly dyspneic with ambulation but able to carry on complete sentences.  MD ok for pt to discharge to home.   Reviewed AVS and discharge meds with pt.   Encouraged pt to stop smoking.  Does have patches at home.    to drive pt home.  Wheelchair to car.

## 2023-02-13 NOTE — PROGRESS NOTES
Care Management Discharge Note    Discharge Date: 02/13/2023       Discharge Disposition:  Home    Handoff Referral Completed: No    Additional Information:  Pt identified as an unplanned readmission risk.   No anticipated discharge needs at this time.   Please consult CM/SW  if discharge needs should arise.      Laurita Mccray, RN      Laurita Mccray RN Case Manager  Inpatient Care Coordination  [facility]   [phone]

## 2023-02-15 ENCOUNTER — PATIENT OUTREACH (OUTPATIENT)
Dept: CARE COORDINATION | Facility: CLINIC | Age: 58
End: 2023-02-15
Payer: COMMERCIAL

## 2023-02-15 NOTE — PROGRESS NOTES
Clinic Care Coordination Contact  [facility]/Voicemail       Clinical Data: Care Coordinator Outreach  Outreach attempted x 2.  Left message on patient's voicemail with call back information and requested return call.  Plan: Care Coordinator will do no further outreaches at this time.          STANLEY Bueno  [phone]  [facility]

## 2023-03-26 ENCOUNTER — APPOINTMENT (OUTPATIENT)
Dept: CT IMAGING | Facility: CLINIC | Age: 58
End: 2023-03-26
Attending: EMERGENCY MEDICINE
Payer: COMMERCIAL

## 2023-03-26 ENCOUNTER — HEALTH MAINTENANCE LETTER (OUTPATIENT)
Age: 58
End: 2023-03-26

## 2023-03-26 ENCOUNTER — HOSPITAL ENCOUNTER (INPATIENT)
Facility: CLINIC | Age: 58
LOS: 6 days | Discharge: HOME | End: 2023-04-01
Attending: EMERGENCY MEDICINE | Admitting: INTERNAL MEDICINE
Payer: COMMERCIAL

## 2023-03-26 ENCOUNTER — APPOINTMENT (OUTPATIENT)
Dept: GENERAL RADIOLOGY | Facility: CLINIC | Age: 58
End: 2023-03-26
Attending: EMERGENCY MEDICINE
Payer: COMMERCIAL

## 2023-03-26 DIAGNOSIS — J44.1 CHRONIC OBSTRUCTIVE PULMONARY DISEASE WITH ACUTE EXACERBATION (H): Primary | ICD-10-CM

## 2023-03-26 DIAGNOSIS — J96.01 ACUTE RESPIRATORY FAILURE WITH HYPOXIA (H): ICD-10-CM

## 2023-03-26 DIAGNOSIS — J18.9 PNEUMONIA OF BOTH LOWER LOBES DUE TO INFECTIOUS ORGANISM: ICD-10-CM

## 2023-03-26 DIAGNOSIS — I48.91 ATRIAL FIBRILLATION WITH RAPID VENTRICULAR RESPONSE (H): ICD-10-CM

## 2023-03-26 DIAGNOSIS — M54.50 ACUTE LOW BACK PAIN, UNSPECIFIED BACK PAIN LATERALITY, UNSPECIFIED WHETHER SCIATICA PRESENT: ICD-10-CM

## 2023-03-26 DIAGNOSIS — J44.1 COPD EXACERBATION (H): ICD-10-CM

## 2023-03-26 LAB
ALBUMIN SERPL BCG-MCNC: 4 G/DL (ref 3.5–5.2)
ALP SERPL-CCNC: 76 U/L (ref 35–104)
ALT SERPL W P-5'-P-CCNC: 27 U/L (ref 10–35)
ANION GAP SERPL CALCULATED.3IONS-SCNC: 12 MMOL/L (ref 7–15)
APTT PPP: 25 SECONDS (ref 22–38)
AST SERPL W P-5'-P-CCNC: 23 U/L (ref 10–35)
BASE EXCESS BLDV CALC-SCNC: 2.5 MMOL/L (ref -7.7–1.9)
BASOPHILS # BLD AUTO: 0 10E3/UL (ref 0–0.2)
BASOPHILS NFR BLD AUTO: 0 %
BILIRUB SERPL-MCNC: 0.2 MG/DL
BUN SERPL-MCNC: 5.7 MG/DL (ref 8–23)
CALCIUM SERPL-MCNC: 9 MG/DL (ref 8.6–10)
CHLORIDE SERPL-SCNC: 104 MMOL/L (ref 98–107)
CREAT SERPL-MCNC: 0.66 MG/DL (ref 0.51–0.95)
D DIMER PPP FEU-MCNC: 1.15 UG/ML FEU (ref 0–0.5)
DEPRECATED HCO3 PLAS-SCNC: 25 MMOL/L (ref 22–29)
EOSINOPHIL # BLD AUTO: 0 10E3/UL (ref 0–0.7)
EOSINOPHIL NFR BLD AUTO: 1 %
ERYTHROCYTE [DISTWIDTH] IN BLOOD BY AUTOMATED COUNT: 13.3 % (ref 10–15)
FLUAV RNA SPEC QL NAA+PROBE: NEGATIVE
FLUBV RNA RESP QL NAA+PROBE: NEGATIVE
GFR SERPL CREATININE-BSD FRML MDRD: >90 ML/MIN/1.73M2
GLUCOSE BLDC GLUCOMTR-MCNC: 115 MG/DL (ref 70–99)
GLUCOSE BLDC GLUCOMTR-MCNC: 176 MG/DL (ref 70–99)
GLUCOSE BLDC GLUCOMTR-MCNC: 179 MG/DL (ref 70–99)
GLUCOSE BLDC GLUCOMTR-MCNC: 344 MG/DL (ref 70–99)
GLUCOSE SERPL-MCNC: 89 MG/DL (ref 70–99)
HBA1C MFR BLD: 4.9 %
HCO3 BLDV-SCNC: 27 MMOL/L (ref 21–28)
HCO3 BLDV-SCNC: 27 MMOL/L (ref 21–28)
HCT VFR BLD AUTO: 34.3 % (ref 35–47)
HGB BLD-MCNC: 11.3 G/DL (ref 11.7–15.7)
HOLD SPECIMEN: NORMAL
HOLD SPECIMEN: NORMAL
IMM GRANULOCYTES # BLD: 0.2 10E3/UL
IMM GRANULOCYTES NFR BLD: 3 %
INR PPP: 0.97 (ref 0.85–1.15)
LACTATE BLD-SCNC: 1.1 MMOL/L
LYMPHOCYTES # BLD AUTO: 1.7 10E3/UL (ref 0.8–5.3)
LYMPHOCYTES NFR BLD AUTO: 30 %
MCH RBC QN AUTO: 34.8 PG (ref 26.5–33)
MCHC RBC AUTO-ENTMCNC: 32.9 G/DL (ref 31.5–36.5)
MCV RBC AUTO: 106 FL (ref 78–100)
MONOCYTES # BLD AUTO: 0.5 10E3/UL (ref 0–1.3)
MONOCYTES NFR BLD AUTO: 8 %
NEUTROPHILS # BLD AUTO: 3.4 10E3/UL (ref 1.6–8.3)
NEUTROPHILS NFR BLD AUTO: 58 %
NRBC # BLD AUTO: 0 10E3/UL
NRBC BLD AUTO-RTO: 0 /100
NT-PROBNP SERPL-MCNC: 199 PG/ML (ref 0–900)
O2/TOTAL GAS SETTING VFR VENT: 30 %
PCO2 BLDV: 39 MM HG (ref 40–50)
PCO2 BLDV: 50 MM HG (ref 40–50)
PH BLDV: 7.33 [PH] (ref 7.32–7.43)
PH BLDV: 7.45 [PH] (ref 7.32–7.43)
PLATELET # BLD AUTO: 231 10E3/UL (ref 150–450)
PO2 BLDV: 68 MM HG (ref 25–47)
PO2 BLDV: 81 MM HG (ref 25–47)
POTASSIUM SERPL-SCNC: 4.3 MMOL/L (ref 3.4–5.3)
PROT SERPL-MCNC: 7.1 G/DL (ref 6.4–8.3)
RBC # BLD AUTO: 3.25 10E6/UL (ref 3.8–5.2)
RSV RNA SPEC NAA+PROBE: NEGATIVE
SAO2 % BLDV: 95 % (ref 94–100)
SARS-COV-2 RNA RESP QL NAA+PROBE: NEGATIVE
SODIUM SERPL-SCNC: 141 MMOL/L (ref 136–145)
TROPONIN T SERPL HS-MCNC: 8 NG/L
WBC # BLD AUTO: 5.8 10E3/UL (ref 4–11)

## 2023-03-26 PROCEDURE — 83880 ASSAY OF NATRIURETIC PEPTIDE: CPT | Performed by: EMERGENCY MEDICINE

## 2023-03-26 PROCEDURE — 85610 PROTHROMBIN TIME: CPT | Performed by: EMERGENCY MEDICINE

## 2023-03-26 PROCEDURE — 94640 AIRWAY INHALATION TREATMENT: CPT | Mod: 76

## 2023-03-26 PROCEDURE — 96376 TX/PRO/DX INJ SAME DRUG ADON: CPT

## 2023-03-26 PROCEDURE — 87040 BLOOD CULTURE FOR BACTERIA: CPT | Performed by: INTERNAL MEDICINE

## 2023-03-26 PROCEDURE — 258N000003 HC RX IP 258 OP 636: Performed by: INTERNAL MEDICINE

## 2023-03-26 PROCEDURE — 99291 CRITICAL CARE FIRST HOUR: CPT | Mod: AI | Performed by: INTERNAL MEDICINE

## 2023-03-26 PROCEDURE — 250N000009 HC RX 250: Performed by: INTERNAL MEDICINE

## 2023-03-26 PROCEDURE — 250N000011 HC RX IP 250 OP 636: Performed by: EMERGENCY MEDICINE

## 2023-03-26 PROCEDURE — 36415 COLL VENOUS BLD VENIPUNCTURE: CPT | Performed by: EMERGENCY MEDICINE

## 2023-03-26 PROCEDURE — 94660 CPAP INITIATION&MGMT: CPT

## 2023-03-26 PROCEDURE — 96366 THER/PROPH/DIAG IV INF ADDON: CPT

## 2023-03-26 PROCEDURE — 84484 ASSAY OF TROPONIN QUANT: CPT | Performed by: EMERGENCY MEDICINE

## 2023-03-26 PROCEDURE — 85025 COMPLETE CBC W/AUTO DIFF WBC: CPT | Performed by: EMERGENCY MEDICINE

## 2023-03-26 PROCEDURE — 250N000012 HC RX MED GY IP 250 OP 636 PS 637: Performed by: INTERNAL MEDICINE

## 2023-03-26 PROCEDURE — 87637 SARSCOV2&INF A&B&RSV AMP PRB: CPT | Performed by: EMERGENCY MEDICINE

## 2023-03-26 PROCEDURE — 71045 X-RAY EXAM CHEST 1 VIEW: CPT

## 2023-03-26 PROCEDURE — 999N000157 HC STATISTIC RCP TIME EA 10 MIN

## 2023-03-26 PROCEDURE — 250N000013 HC RX MED GY IP 250 OP 250 PS 637: Performed by: INTERNAL MEDICINE

## 2023-03-26 PROCEDURE — 96365 THER/PROPH/DIAG IV INF INIT: CPT | Mod: 59

## 2023-03-26 PROCEDURE — 83605 ASSAY OF LACTIC ACID: CPT

## 2023-03-26 PROCEDURE — 93005 ELECTROCARDIOGRAM TRACING: CPT

## 2023-03-26 PROCEDURE — 250N000011 HC RX IP 250 OP 636: Performed by: INTERNAL MEDICINE

## 2023-03-26 PROCEDURE — 258N000003 HC RX IP 258 OP 636: Performed by: EMERGENCY MEDICINE

## 2023-03-26 PROCEDURE — 85379 FIBRIN DEGRADATION QUANT: CPT | Performed by: EMERGENCY MEDICINE

## 2023-03-26 PROCEDURE — C9803 HOPD COVID-19 SPEC COLLECT: HCPCS

## 2023-03-26 PROCEDURE — 85730 THROMBOPLASTIN TIME PARTIAL: CPT | Performed by: EMERGENCY MEDICINE

## 2023-03-26 PROCEDURE — 36415 COLL VENOUS BLD VENIPUNCTURE: CPT | Performed by: INTERNAL MEDICINE

## 2023-03-26 PROCEDURE — 80053 COMPREHEN METABOLIC PANEL: CPT | Performed by: EMERGENCY MEDICINE

## 2023-03-26 PROCEDURE — 82803 BLOOD GASES ANY COMBINATION: CPT

## 2023-03-26 PROCEDURE — 94640 AIRWAY INHALATION TREATMENT: CPT

## 2023-03-26 PROCEDURE — 83036 HEMOGLOBIN GLYCOSYLATED A1C: CPT | Performed by: INTERNAL MEDICINE

## 2023-03-26 PROCEDURE — 71275 CT ANGIOGRAPHY CHEST: CPT

## 2023-03-26 PROCEDURE — 82803 BLOOD GASES ANY COMBINATION: CPT | Performed by: INTERNAL MEDICINE

## 2023-03-26 PROCEDURE — 96375 TX/PRO/DX INJ NEW DRUG ADDON: CPT

## 2023-03-26 PROCEDURE — 250N000009 HC RX 250: Performed by: EMERGENCY MEDICINE

## 2023-03-26 PROCEDURE — 99291 CRITICAL CARE FIRST HOUR: CPT | Mod: CS,25

## 2023-03-26 PROCEDURE — 5A09457 ASSISTANCE WITH RESPIRATORY VENTILATION, 24-96 CONSECUTIVE HOURS, CONTINUOUS POSITIVE AIRWAY PRESSURE: ICD-10-PCS | Performed by: EMERGENCY MEDICINE

## 2023-03-26 PROCEDURE — 200N000001 HC R&B ICU

## 2023-03-26 RX ORDER — AMOXICILLIN 250 MG
1 CAPSULE ORAL 2 TIMES DAILY PRN
Status: DISCONTINUED | OUTPATIENT
Start: 2023-03-26 | End: 2023-04-01 | Stop reason: HOSPADM

## 2023-03-26 RX ORDER — PROCHLORPERAZINE 25 MG
25 SUPPOSITORY, RECTAL RECTAL EVERY 12 HOURS PRN
Status: DISCONTINUED | OUTPATIENT
Start: 2023-03-26 | End: 2023-04-01 | Stop reason: HOSPADM

## 2023-03-26 RX ORDER — DEXTROSE MONOHYDRATE 25 G/50ML
25-50 INJECTION, SOLUTION INTRAVENOUS
Status: DISCONTINUED | OUTPATIENT
Start: 2023-03-26 | End: 2023-03-27

## 2023-03-26 RX ORDER — IPRATROPIUM BROMIDE AND ALBUTEROL SULFATE 2.5; .5 MG/3ML; MG/3ML
3 SOLUTION RESPIRATORY (INHALATION) ONCE
Status: COMPLETED | OUTPATIENT
Start: 2023-03-26 | End: 2023-03-26

## 2023-03-26 RX ORDER — CEFTRIAXONE 2 G/1
2 INJECTION, POWDER, FOR SOLUTION INTRAMUSCULAR; INTRAVENOUS EVERY 24 HOURS
Status: DISCONTINUED | OUTPATIENT
Start: 2023-03-27 | End: 2023-04-01 | Stop reason: HOSPADM

## 2023-03-26 RX ORDER — DEXTROSE MONOHYDRATE 25 G/50ML
25-50 INJECTION, SOLUTION INTRAVENOUS
Status: DISCONTINUED | OUTPATIENT
Start: 2023-03-26 | End: 2023-04-01 | Stop reason: HOSPADM

## 2023-03-26 RX ORDER — PREDNISONE 20 MG/1
40 TABLET ORAL DAILY
Status: ON HOLD | COMMUNITY
End: 2023-04-01

## 2023-03-26 RX ORDER — PAROXETINE 20 MG/1
20 TABLET, FILM COATED ORAL DAILY
Status: DISCONTINUED | OUTPATIENT
Start: 2023-03-26 | End: 2023-04-01 | Stop reason: HOSPADM

## 2023-03-26 RX ORDER — POLYETHYLENE GLYCOL 3350 17 G/17G
17 POWDER, FOR SOLUTION ORAL DAILY PRN
Status: DISCONTINUED | OUTPATIENT
Start: 2023-03-26 | End: 2023-04-01 | Stop reason: HOSPADM

## 2023-03-26 RX ORDER — NICOTINE 21 MG/24HR
1 PATCH, TRANSDERMAL 24 HOURS TRANSDERMAL DAILY
Status: DISCONTINUED | OUTPATIENT
Start: 2023-03-26 | End: 2023-04-01 | Stop reason: HOSPADM

## 2023-03-26 RX ORDER — MAGNESIUM SULFATE HEPTAHYDRATE 40 MG/ML
4 INJECTION, SOLUTION INTRAVENOUS ONCE
Status: COMPLETED | OUTPATIENT
Start: 2023-03-26 | End: 2023-03-26

## 2023-03-26 RX ORDER — AZITHROMYCIN 500 MG/5ML
500 INJECTION, POWDER, LYOPHILIZED, FOR SOLUTION INTRAVENOUS ONCE
Status: COMPLETED | OUTPATIENT
Start: 2023-03-26 | End: 2023-03-26

## 2023-03-26 RX ORDER — LEVOTHYROXINE SODIUM 112 UG/1
112 TABLET ORAL DAILY
Status: DISCONTINUED | OUTPATIENT
Start: 2023-03-26 | End: 2023-04-01 | Stop reason: HOSPADM

## 2023-03-26 RX ORDER — CEFTRIAXONE 1 G/1
1 INJECTION, POWDER, FOR SOLUTION INTRAMUSCULAR; INTRAVENOUS ONCE
Status: COMPLETED | OUTPATIENT
Start: 2023-03-26 | End: 2023-03-26

## 2023-03-26 RX ORDER — METHYLPREDNISOLONE SODIUM SUCCINATE 40 MG/ML
40 INJECTION, POWDER, LYOPHILIZED, FOR SOLUTION INTRAMUSCULAR; INTRAVENOUS EVERY 8 HOURS
Status: DISCONTINUED | OUTPATIENT
Start: 2023-03-26 | End: 2023-03-29

## 2023-03-26 RX ORDER — NICOTINE POLACRILEX 4 MG
15-30 LOZENGE BUCCAL
Status: DISCONTINUED | OUTPATIENT
Start: 2023-03-26 | End: 2023-03-27

## 2023-03-26 RX ORDER — ENOXAPARIN SODIUM 100 MG/ML
40 INJECTION SUBCUTANEOUS DAILY
Status: DISCONTINUED | OUTPATIENT
Start: 2023-03-26 | End: 2023-03-28

## 2023-03-26 RX ORDER — IPRATROPIUM BROMIDE AND ALBUTEROL SULFATE 2.5; .5 MG/3ML; MG/3ML
3 SOLUTION RESPIRATORY (INHALATION) EVERY 4 HOURS PRN
Status: DISCONTINUED | OUTPATIENT
Start: 2023-03-26 | End: 2023-04-01 | Stop reason: HOSPADM

## 2023-03-26 RX ORDER — IPRATROPIUM BROMIDE AND ALBUTEROL SULFATE 2.5; .5 MG/3ML; MG/3ML
3 SOLUTION RESPIRATORY (INHALATION)
Status: DISCONTINUED | OUTPATIENT
Start: 2023-03-26 | End: 2023-03-28

## 2023-03-26 RX ORDER — CLONAZEPAM 0.5 MG/1
0.5 TABLET ORAL DAILY
Status: DISCONTINUED | OUTPATIENT
Start: 2023-03-26 | End: 2023-04-01 | Stop reason: HOSPADM

## 2023-03-26 RX ORDER — IOPAMIDOL 755 MG/ML
500 INJECTION, SOLUTION INTRAVASCULAR ONCE
Status: COMPLETED | OUTPATIENT
Start: 2023-03-26 | End: 2023-03-26

## 2023-03-26 RX ORDER — GUAIFENESIN AND DEXTROMETHORPHAN HYDROBROMIDE 600; 30 MG/1; MG/1
1 TABLET, EXTENDED RELEASE ORAL 2 TIMES DAILY PRN
Status: DISCONTINUED | OUTPATIENT
Start: 2023-03-26 | End: 2023-03-29

## 2023-03-26 RX ORDER — PROCHLORPERAZINE MALEATE 5 MG
10 TABLET ORAL EVERY 6 HOURS PRN
Status: DISCONTINUED | OUTPATIENT
Start: 2023-03-26 | End: 2023-04-01 | Stop reason: HOSPADM

## 2023-03-26 RX ORDER — ONDANSETRON 2 MG/ML
4 INJECTION INTRAMUSCULAR; INTRAVENOUS EVERY 6 HOURS PRN
Status: DISCONTINUED | OUTPATIENT
Start: 2023-03-26 | End: 2023-04-01 | Stop reason: HOSPADM

## 2023-03-26 RX ORDER — ONDANSETRON 4 MG/1
4 TABLET, ORALLY DISINTEGRATING ORAL EVERY 6 HOURS PRN
Status: DISCONTINUED | OUTPATIENT
Start: 2023-03-26 | End: 2023-04-01 | Stop reason: HOSPADM

## 2023-03-26 RX ORDER — DILTIAZEM HYDROCHLORIDE 5 MG/ML
20 INJECTION INTRAVENOUS ONCE
Status: COMPLETED | OUTPATIENT
Start: 2023-03-26 | End: 2023-03-26

## 2023-03-26 RX ORDER — ACETAMINOPHEN 325 MG/1
650 TABLET ORAL EVERY 6 HOURS PRN
Status: DISCONTINUED | OUTPATIENT
Start: 2023-03-26 | End: 2023-04-01 | Stop reason: HOSPADM

## 2023-03-26 RX ORDER — NICOTINE POLACRILEX 4 MG
15-30 LOZENGE BUCCAL
Status: DISCONTINUED | OUTPATIENT
Start: 2023-03-26 | End: 2023-04-01 | Stop reason: HOSPADM

## 2023-03-26 RX ORDER — AMOXICILLIN 250 MG
2 CAPSULE ORAL 2 TIMES DAILY PRN
Status: DISCONTINUED | OUTPATIENT
Start: 2023-03-26 | End: 2023-04-01 | Stop reason: HOSPADM

## 2023-03-26 RX ORDER — CYCLOBENZAPRINE HCL 5 MG
5 TABLET ORAL EVERY 8 HOURS PRN
Status: DISCONTINUED | OUTPATIENT
Start: 2023-03-26 | End: 2023-04-01 | Stop reason: HOSPADM

## 2023-03-26 RX ORDER — DOXYCYCLINE 100 MG/10ML
100 INJECTION, POWDER, LYOPHILIZED, FOR SOLUTION INTRAVENOUS 2 TIMES DAILY
Status: DISCONTINUED | OUTPATIENT
Start: 2023-03-26 | End: 2023-03-28

## 2023-03-26 RX ORDER — METHYLPREDNISOLONE SODIUM SUCCINATE 125 MG/2ML
125 INJECTION, POWDER, LYOPHILIZED, FOR SOLUTION INTRAMUSCULAR; INTRAVENOUS ONCE
Status: COMPLETED | OUTPATIENT
Start: 2023-03-26 | End: 2023-03-26

## 2023-03-26 RX ORDER — ACETAMINOPHEN 650 MG/1
650 SUPPOSITORY RECTAL EVERY 4 HOURS PRN
Status: DISCONTINUED | OUTPATIENT
Start: 2023-03-26 | End: 2023-03-29

## 2023-03-26 RX ADMIN — SODIUM CHLORIDE 74 ML: 9 INJECTION, SOLUTION INTRAVENOUS at 08:59

## 2023-03-26 RX ADMIN — DILTIAZEM HYDROCHLORIDE 20 MG: 5 INJECTION, SOLUTION INTRAVENOUS at 07:17

## 2023-03-26 RX ADMIN — LEVOTHYROXINE SODIUM 112 MCG: 0.11 TABLET ORAL at 14:49

## 2023-03-26 RX ADMIN — METHYLPREDNISOLONE SODIUM SUCCINATE 40 MG: 40 INJECTION, POWDER, FOR SOLUTION INTRAMUSCULAR; INTRAVENOUS at 14:45

## 2023-03-26 RX ADMIN — SODIUM CHLORIDE 500 ML: 9 INJECTION, SOLUTION INTRAVENOUS at 06:23

## 2023-03-26 RX ADMIN — CEFTRIAXONE 1 G: 1 INJECTION, POWDER, FOR SOLUTION INTRAMUSCULAR; INTRAVENOUS at 11:28

## 2023-03-26 RX ADMIN — PAROXETINE HYDROCHLORIDE 20 MG: 20 TABLET, FILM COATED ORAL at 14:46

## 2023-03-26 RX ADMIN — IOPAMIDOL 53 ML: 755 INJECTION, SOLUTION INTRAVENOUS at 08:59

## 2023-03-26 RX ADMIN — IPRATROPIUM BROMIDE AND ALBUTEROL SULFATE 3 ML: .5; 3 SOLUTION RESPIRATORY (INHALATION) at 19:33

## 2023-03-26 RX ADMIN — DILTIAZEM HYDROCHLORIDE 15 MG/HR: 5 INJECTION, SOLUTION INTRAVENOUS at 16:43

## 2023-03-26 RX ADMIN — CLONAZEPAM 0.5 MG: 0.5 TABLET ORAL at 14:46

## 2023-03-26 RX ADMIN — METHYLPREDNISOLONE SODIUM SUCCINATE 125 MG: 125 INJECTION, POWDER, FOR SOLUTION INTRAMUSCULAR; INTRAVENOUS at 06:24

## 2023-03-26 RX ADMIN — DICLOFENAC SODIUM 2 G: 10 GEL TOPICAL at 21:40

## 2023-03-26 RX ADMIN — GUAIFENESIN AND DEXTROMETHORPHAN HYDROBROMIDE 1 TABLET: 30; 600 TABLET, EXTENDED RELEASE ORAL at 16:39

## 2023-03-26 RX ADMIN — MAGNESIUM SULFATE HEPTAHYDRATE 4 G: 4 INJECTION, SOLUTION INTRAVENOUS at 06:23

## 2023-03-26 RX ADMIN — IPRATROPIUM BROMIDE AND ALBUTEROL SULFATE 3 ML: .5; 3 SOLUTION RESPIRATORY (INHALATION) at 15:17

## 2023-03-26 RX ADMIN — ACETAMINOPHEN 650 MG: 325 TABLET, FILM COATED ORAL at 18:38

## 2023-03-26 RX ADMIN — IPRATROPIUM BROMIDE AND ALBUTEROL SULFATE 3 ML: .5; 3 SOLUTION RESPIRATORY (INHALATION) at 07:22

## 2023-03-26 RX ADMIN — DOXYCYCLINE 100 MG: 100 INJECTION, POWDER, LYOPHILIZED, FOR SOLUTION INTRAVENOUS at 21:30

## 2023-03-26 RX ADMIN — INSULIN HUMAN 8 UNITS: 100 INJECTION, SUSPENSION SUBCUTANEOUS at 19:16

## 2023-03-26 RX ADMIN — DICLOFENAC SODIUM 2 G: 10 GEL TOPICAL at 19:17

## 2023-03-26 RX ADMIN — NICOTINE 1 PATCH: 21 PATCH, EXTENDED RELEASE TRANSDERMAL at 16:39

## 2023-03-26 RX ADMIN — AZITHROMYCIN MONOHYDRATE 500 MG: 500 INJECTION, POWDER, LYOPHILIZED, FOR SOLUTION INTRAVENOUS at 12:07

## 2023-03-26 RX ADMIN — DILTIAZEM HYDROCHLORIDE 5 MG/HR: 5 INJECTION, SOLUTION INTRAVENOUS at 07:53

## 2023-03-26 RX ADMIN — ENOXAPARIN SODIUM 40 MG: 40 INJECTION SUBCUTANEOUS at 14:45

## 2023-03-26 RX ADMIN — METHYLPREDNISOLONE SODIUM SUCCINATE 40 MG: 40 INJECTION, POWDER, FOR SOLUTION INTRAMUSCULAR; INTRAVENOUS at 21:34

## 2023-03-26 ASSESSMENT — ACTIVITIES OF DAILY LIVING (ADL)
VISION_MANAGEMENT: GLASSES
FALL_HISTORY_WITHIN_LAST_SIX_MONTHS: NO
ADLS_ACUITY_SCORE: 35
WEAR_GLASSES_OR_BLIND: YES
DRESSING/BATHING_DIFFICULTY: NO
ADLS_ACUITY_SCORE: 35
ADLS_ACUITY_SCORE: 24
ADLS_ACUITY_SCORE: 28
ADLS_ACUITY_SCORE: 35
ADLS_ACUITY_SCORE: 35
DOING_ERRANDS_INDEPENDENTLY_DIFFICULTY: NO
CHANGE_IN_FUNCTIONAL_STATUS_SINCE_ONSET_OF_CURRENT_ILLNESS/INJURY: NO
TOILETING_ISSUES: NO
ADLS_ACUITY_SCORE: 24
DIFFICULTY_EATING/SWALLOWING: NO
WALKING_OR_CLIMBING_STAIRS_DIFFICULTY: NO
ADLS_ACUITY_SCORE: 35
ADLS_ACUITY_SCORE: 24
CONCENTRATING,_REMEMBERING_OR_MAKING_DECISIONS_DIFFICULTY: NO

## 2023-03-26 ASSESSMENT — ENCOUNTER SYMPTOMS
BACK PAIN: 1
FEVER: 0
ABDOMINAL PAIN: 0
SHORTNESS OF BREATH: 1
COUGH: 1
RHINORRHEA: 1
VOMITING: 0
WHEEZING: 1
DIARRHEA: 0

## 2023-03-26 NOTE — PROGRESS NOTES
A BiPAP of  12/6 @ 30% was applied to the pt via the mask for an increase in WOB and/or SOB.  Skin is intact. Pt is tolerating it well. Will continue to monitor and assess the pt's current respiratory status and needs.    Renny Gamboa RT on 3/26/2023 at 6:19 AM     [id number]

## 2023-03-26 NOTE — ED PROVIDER NOTES
History     Chief Complaint:  Shortness of Breath       HPI   Lara Melendez is a 57 year old female with a history of COPD who presents with shortness of breath that progressively worsened the last 3 days.  She denies any chest pain but does have some left upper back pain.  She has been coughing but no phlegm or hemoptysis.  She denies any fevers but has had recent cold symptoms.  She also has noted bilateral leg swelling.  She denies any abdominal pain, vomiting, diarrhea.  She normally uses 3 L nasal cannula at night only.  She had done 3 consecutive nebulizers on EMS arrival.  EMS gave her 1 mg of midazolam in route and started on BiPAP which improved her symptoms.      Independent Historian:    EMS provided much of the above history including treatments provided and her initial appearance and progression of her symptoms.    Review of External Notes:   Discharge note from 2/13/2023 from hospitalization for COPD exacerbation.    ROS:  Review of Systems   Constitutional: Negative for fever.   HENT: Positive for congestion and rhinorrhea.    Respiratory: Positive for cough, shortness of breath and wheezing.    Cardiovascular: Positive for leg swelling. Negative for chest pain.   Gastrointestinal: Negative for abdominal pain, diarrhea and vomiting.   Musculoskeletal: Positive for back pain.   All other systems reviewed and are negative.      Allergies:  Sulfa Drugs  Penicillins     Medications:    albuterol (PROAIR HFA/PROVENTIL HFA/VENTOLIN HFA) 108 (90 Base) MCG/ACT inhaler  albuterol (PROVENTIL) (2.5 MG/3ML) 0.083% neb solution  atorvastatin (LIPITOR) 20 MG tablet  clonazePAM (KLONOPIN) 0.5 MG tablet  fluticasone-salmeterol (ADVAIR-HFA) 230-21 MCG/ACT inhaler  guaiFENesin (MUCINEX) 600 MG 12 hr tablet  ipratropium - albuterol 0.5 mg/2.5 mg/3 mL (DUONEB) 0.5-2.5 (3) MG/3ML neb solution  levothyroxine (SYNTHROID/LEVOTHROID) 112 MCG tablet  losartan (COZAAR) 25 MG tablet  PARoxetine (PAXIL) 20 MG  "tablet  tiotropium (SPIRIVA RESPIMAT) 2.5 MCG/ACT inhaler  venlafaxine (EFFEXOR XR) 37.5 MG 24 hr capsule        Past Medical History:    Past Medical History:   Diagnosis Date     Anxiety      COPD (chronic obstructive pulmonary disease)      Hypercholesterolemia      Hypertension      Hypothyroidism        Past Surgical History:    Past Surgical History:   Procedure Laterality Date     CHOLECYSTECTOMY       INCISION AND DRAINAGE MANDIBLE, COMBINED Left 01/10/2022    Procedure: INCISION AND DRAINAGE, MANDIBLE;  Surgeon: Jn Feliz DDS;  Location: [facility] OR     INCISION AND DRAINAGE MANDIBLE, COMBINED Left 02/04/2022    Procedure: INCISION AND DRAINAGE, MANDIBLE;  Surgeon: Taylor Ortez DDS;  Location: [facility] OR     TOOTH EXTRACTION          Family History:    family history includes Deep Vein Thrombosis (DVT) in her mother; Hypertension in her mother.    Social History:   reports that she has never smoked. She has never used smokeless tobacco. She reports current alcohol use. She reports that she does not use drugs.  PCP: Chyna Rust     Physical Exam     Patient Vitals for the past 24 hrs:   BP Temp Temp src Pulse Resp SpO2 Height Weight   03/26/23 1317 (!) 115/91 97.9  F (36.6  C) Temporal 116 (!) 35 99 % 1.626 m (5' 4\") 65.3 kg (143 lb 15.4 oz)   03/26/23 1250 122/74 -- -- (!) 125 25 93 % -- --   03/26/23 1240 -- -- -- 101 26 98 % -- --   03/26/23 1230 125/88 -- -- (!) 123 20 98 % -- --   03/26/23 1220 -- -- -- (!) 129 13 96 % -- --   03/26/23 1215 121/87 -- -- (!) 123 13 98 % -- --   03/26/23 1210 -- -- -- 109 17 98 % -- --   03/26/23 1200 124/87 -- -- 112 15 96 % -- --   03/26/23 1150 -- -- -- (!) 133 20 96 % -- --   03/26/23 1145 (!) 143/92 -- -- 108 13 95 % -- --   03/26/23 1140 (!) 143/92 -- -- (!) 133 18 96 % -- --   03/26/23 1130 123/89 -- -- (!) 128 17 96 % -- --   03/26/23 1115 (!) 122/99 -- -- (!) 140 15 97 % -- --   03/26/23 1110 -- -- -- 112 27 98 % -- --   03/26/23 1100 (!) 136/91 -- -- (!) " 128 25 98 % -- --   03/26/23 1045 -- -- -- 108 15 98 % -- --   03/26/23 1030 -- -- -- 110 18 99 % -- --   03/26/23 1015 130/83 -- -- 103 18 98 % -- --   03/26/23 1000 113/84 -- -- 101 16 98 % -- --   03/26/23 0945 114/87 -- -- 120 15 95 % -- --   03/26/23 0930 123/83 -- -- (!) 129 18 95 % -- --   03/26/23 0915 127/84 -- -- 118 16 97 % -- --   03/26/23 0845 126/84 -- -- 118 13 98 % -- --   03/26/23 0830 (!) 129/92 -- -- 107 14 97 % -- --   03/26/23 0815 (!) 135/94 -- -- 109 16 97 % -- --   03/26/23 0805 -- -- -- 105 23 99 % -- --   03/26/23 0800 (!) 124/91 -- -- 116 23 98 % -- --   03/26/23 0750 -- -- -- 120 17 96 % -- --   03/26/23 0745 128/77 -- -- 105 17 95 % -- --   03/26/23 0740 -- -- -- 104 18 96 % -- --   03/26/23 0735 -- -- -- 106 19 96 % -- --   03/26/23 0730 127/81 -- -- 97 20 97 % -- --   03/26/23 0725 112/66 -- -- 112 19 99 % -- --   03/26/23 0722 -- -- -- (!) 122 17 99 % -- --   03/26/23 0720 -- -- -- (!) 144 18 96 % -- --   03/26/23 0715 (!) 120/90 -- -- 118 18 96 % -- --   03/26/23 0702 (!) 126/90 -- -- (!) 142 19 96 % -- --   03/26/23 0647 (!) 147/99 -- -- (!) 123 21 97 % -- --   03/26/23 0634 (!) 129/96 -- -- (!) 135 19 98 % -- --   03/26/23 0619 (!) 137/109 -- -- (!) 151 24 99 % -- --   03/26/23 0615 -- -- -- (!) 144 24 97 % -- --   03/26/23 0613 (!) 157/101 98.8  F (37.1  C) Temporal (!) 139 28 94 % -- --        Physical Exam  Vitals and nursing note reviewed.   Constitutional:       General: She is in acute distress.      Appearance: She is ill-appearing.   HENT:      Head: Normocephalic and atraumatic.      Right Ear: External ear normal.      Left Ear: External ear normal.   Eyes:      Extraocular Movements: Extraocular movements intact.      Conjunctiva/sclera: Conjunctivae normal.   Cardiovascular:      Rate and Rhythm: Tachycardia present. Rhythm irregular.      Heart sounds: No murmur heard.  Pulmonary:      Effort: Tachypnea and respiratory distress present.      Breath sounds: Wheezing  (diffusely) present. No rhonchi or rales.   Abdominal:      General: Abdomen is flat. Bowel sounds are normal. There is no distension.      Palpations: Abdomen is soft.      Tenderness: There is no abdominal tenderness. There is no guarding or rebound.   Musculoskeletal:         General: Swelling (BLE) present. No deformity or signs of injury.      Cervical back: Normal range of motion and neck supple.   Skin:     General: Skin is warm and dry.      Findings: No rash.   Neurological:      Mental Status: She is alert and oriented to person, place, and time.   Psychiatric:         Behavior: Behavior normal.           Emergency Department Course   ECG  ECG results from 03/26/23   EKG 12-lead, tracing only     Value    Systolic Blood Pressure     Diastolic Blood Pressure     Ventricular Rate 147    Atrial Rate 220    NC Interval     QRS Duration 84        QTc 453    P Axis     R AXIS 49    T Axis -14    Interpretation ECG      Atrial fibrillation with rapid ventricular response  Nonspecific ST abnormality  Abnormal QRS-T angle, consider primary T wave abnormality  Abnormal ECG  When compared with ECG of 11-FEB-2023 08:43,  Atrial fibrillation has replaced Sinus rhythm  T wave inversion no longer evident in Lateral leads         Imaging:  CT Chest Pulmonary Embolism w Contrast   Final Result   IMPRESSION:   1.  No pulmonary artery embolism.   2.  Nonspecific bronchiolitis with bronchial wall thickening and endobronchial mucoid impaction.   3.  Mild bibasilar airspace opacities suspicious for developing pneumonic infiltrates.   4.  Mild to moderate emphysema.   5.  Newly visualized left upper lobe 4 mm nodule, likely postinfectious or postinflammatory. Recommend follow-up per guidelines below.         REFERENCE:   Guidelines for Management of Incidental Pulmonary Nodules Detected on CT Images: From the Fleischner Society 2017.    Guidelines apply to incidental nodules in patients who are 35 years or older.    Guidelines do not apply to lung cancer screening, patients with immunosuppression, or patients with known primary cancer.      SINGLE NODULE   Nodule size less than or equal to 6 mm   Low-risk patients: No follow-up needed.   High-risk patients: Optional follow-up at 12 months.               XR Chest Port 1 View   Final Result   IMPRESSION: No convincing evidence of active cardiopulmonary disease.            Report per radiology    Laboratory:  Labs Ordered and Resulted from Time of ED Arrival to Time of ED Departure   COMPREHENSIVE METABOLIC PANEL - Abnormal       Result Value    Sodium 141      Potassium 4.3      Chloride 104      Carbon Dioxide (CO2) 25      Anion Gap 12      Urea Nitrogen 5.7 (*)     Creatinine 0.66      Calcium 9.0      Glucose 89      Alkaline Phosphatase 76      AST 23      ALT 27      Protein Total 7.1      Albumin 4.0      Bilirubin Total 0.2      GFR Estimate >90     CBC WITH PLATELETS AND DIFFERENTIAL - Abnormal    WBC Count 5.8      RBC Count 3.25 (*)     Hemoglobin 11.3 (*)     Hematocrit 34.3 (*)      (*)     MCH 34.8 (*)     MCHC 32.9      RDW 13.3      Platelet Count 231      % Neutrophils 58      % Lymphocytes 30      % Monocytes 8      % Eosinophils 1      % Basophils 0      % Immature Granulocytes 3      NRBCs per 100 WBC 0      Absolute Neutrophils 3.4      Absolute Lymphocytes 1.7      Absolute Monocytes 0.5      Absolute Eosinophils 0.0      Absolute Basophils 0.0      Absolute Immature Granulocytes 0.2      Absolute NRBCs 0.0     ISTAT GASES LACTATE VENOUS POCT - Abnormal    Lactic Acid POCT 1.1      Bicarbonate Venous POCT 27      O2 Sat, Venous POCT 95      pCO2V Venous POCT 50      pH Venous POCT 7.33      pO2 Venous POCT 81 (*)    D DIMER QUANTITATIVE - Abnormal    D-Dimer Quantitative 1.15 (*)    INR - Normal    INR 0.97     PARTIAL THROMBOPLASTIN TIME - Normal    aPTT 25     TROPONIN T, HIGH SENSITIVITY - Normal    Troponin T, High Sensitivity 8     NT PROBNP  INPATIENT - Normal    N terminal Pro BNP Inpatient 199     INFLUENZA A/B, RSV, & SARS-COV2 PCR - Normal    Influenza A PCR Negative      Influenza B PCR Negative      RSV PCR Negative      SARS CoV2 PCR Negative     BLOOD CULTURE   BLOOD CULTURE            Emergency Department Course & Assessments:             Interventions:  Medications   diltiazem (CARDIZEM) 125 mg in sodium chloride 0.9 % 125 mL infusion (15 mg/hr Intravenous Rate/Dose Change 3/26/23 1121)   clonazePAM (klonoPIN) tablet 0.5 mg (has no administration in time range)   levothyroxine (SYNTHROID/LEVOTHROID) tablet 112 mcg (has no administration in time range)   PARoxetine (PAXIL) tablet 20 mg (has no administration in time range)   glucose gel 15-30 g (has no administration in time range)     Or   dextrose 50 % injection 25-50 mL (has no administration in time range)     Or   glucagon injection 1 mg (has no administration in time range)   doxycycline (VIBRAMYCIN) 100 mg vial to attach to  mL bag (has no administration in time range)   cefTRIAXone (ROCEPHIN) 2 g vial to attach to  ml bag for ADULTS or NS 50 ml bag for PEDS (has no administration in time range)   methylPREDNISolone sodium succinate (solu-MEDROL) injection 40 mg (has no administration in time range)   ipratropium - albuterol 0.5 mg/2.5 mg/3 mL (DUONEB) neb solution 3 mL (has no administration in time range)   ipratropium - albuterol 0.5 mg/2.5 mg/3 mL (DUONEB) neb solution 3 mL (has no administration in time range)   enoxaparin ANTICOAGULANT (LOVENOX) injection 40 mg (has no administration in time range)   acetaminophen (TYLENOL) Suppository 650 mg (has no administration in time range)   ondansetron (ZOFRAN ODT) ODT tab 4 mg (has no administration in time range)     Or   ondansetron (ZOFRAN) injection 4 mg (has no administration in time range)   prochlorperazine (COMPAZINE) injection 10 mg (has no administration in time range)     Or   prochlorperazine (COMPAZINE) tablet 10  mg (has no administration in time range)     Or   prochlorperazine (COMPAZINE) suppository 25 mg (has no administration in time range)   senna-docusate (SENOKOT-S/PERICOLACE) 8.6-50 MG per tablet 1 tablet (has no administration in time range)     Or   senna-docusate (SENOKOT-S/PERICOLACE) 8.6-50 MG per tablet 2 tablet (has no administration in time range)   polyethylene glycol (MIRALAX) Packet 17 g (has no administration in time range)   methylPREDNISolone sodium succinate (solu-MEDROL) injection 125 mg (125 mg Intravenous $Given 3/26/23 0624)   magnesium sulfate 4 g in 100 mL sterile water intermittent infusion (0 g Intravenous Stopped 3/26/23 0800)   0.9% sodium chloride BOLUS (0 mLs Intravenous Stopped 3/26/23 0715)   ipratropium - albuterol 0.5 mg/2.5 mg/3 mL (DUONEB) neb solution 3 mL (3 mLs Nebulization $Given 3/26/23 0722)   diltiazem (CARDIZEM) injection 20 mg (20 mg Intravenous $Given 3/26/23 0717)   CT Scan Flush (74 mLs Intravenous $Given 3/26/23 0859)   iopamidol (ISOVUE-370) solution 500 mL (53 mLs Intravenous $Given 3/26/23 0859)   cefTRIAXone (ROCEPHIN) 1 g vial to attach to  mL bag for ADULTS or NS 50 mL bag for PEDS (1 g Intravenous $New Bag 3/26/23 1128)   azithromycin 500 mg (ZITHROMAX) in 0.9% NaCl 250 mL intermittent infusion 500 mg (500 mg Intravenous $New Bag 3/26/23 1207)        Independent Interpretation (X-rays, CTs, rhythm strip):  Chest x-ray shows no obvious infiltrate or pneumothorax per my read    Consultations/Discussion of Management or Tests:  1000 I discussed the case with Dr. Jeter with the hospitalist service       Social Determinants of Health affecting care:  None       Disposition:  The patient was admitted to the hospital under the care of Dr. Jeter.     Impression & Plan    CMS Diagnoses: None    Medical Decision Makin-year-old female presenting with respiratory distress.  Suspect COPD exacerbation given the progressive worsening over the last 3 days as  combined with her wheezing and poor air movement on exam.  Other possibilities include pneumonia, pulmonary edema, pneumothorax, pulmonary embolism.  She arrived on BiPAP and though she is still in some respiratory distress she is significantly improved from when EMS arrived per the report.  We will continue her on BiPAP for now and give another DuoNeb.  We will also give a dose of Solu-Medrol and magnesium.  Patient is also noted to be in atrial fibrillation with rapid ventricular response which apparently is a new diagnosis for her.  Initial work-up included labs and a chest x-ray as noted above.  Her labs are significant for an elevated D-dimer and mild anemia, but otherwise unremarkable.  Chest x-ray did not show any obvious pneumonia or pneumothorax.  Given the elevated D-dimer, a CT was performed and did not show any obvious PE, however she did have signs of developing pneumonia.  Therefore she was covered with ceftriaxone and azithromycin.  Her heart rate did not improve significantly with IV fluids, so a diltiazem bolus and drip were started for rate control.  Is unclear when her atrial fibrillation started so do not feel comfortable cardioverting at this time.  I discussed with the hospitalist service who agreed to admit the patient to the ICU given her BiPAP and diltiazem drip.    Critical Care time:  was 50 minutes for this patient excluding procedures.    Diagnosis:    ICD-10-CM    1. Acute respiratory failure with hypoxia (H)  J96.01       2. COPD exacerbation (H)  J44.1       3. Pneumonia of both lower lobes due to infectious organism  J18.9       4. Atrial fibrillation with rapid ventricular response (H)  I48.91            Discharge Medications:  Current Discharge Medication List               3/26/2023   Ashok Bland MD Goodwin, Shaun M, MD  03/26/23 8735

## 2023-03-26 NOTE — PLAN OF CARE
Shift Events: Admitted from ED this afternoon. On/Off Bipap. Increase WOB with activity. Remains on diltiazem gtt, in afib but HR better controlled.    Neuro: A&O, expresses anxiety at times, standing with SBA  CV: afib, remains on diltiazem gtt and presently has rates in the 70's. BP normotensive  ID: Azithromycin and Rocephin  Pulm: Wheezing, receiving scheduled nebs. BLUE. Using accessory muscles at rest. BIPAP 30% off/on. 3L NC.   GI: Last BM 3/25 per pt. Good appetite. 's-340's (344 after having dessert beverage)  : Voiding bedside commode  Lines/gtts: PIV x2  Skin: bridge of nose blanchable    Plan: Continue w/current ICU cares

## 2023-03-26 NOTE — ED TRIAGE NOTES
Arrived via EMS.  Pt reports shortness of breath.  Pt administered 3 nebs.  EMS reports BPs 180s/110s, a-fib HR 170s, 90% on bipap.  A&Ox4.      Triage Assessment     Row Name 03/26/23 0615       Triage Assessment (Adult)    Airway WDL WDL       Respiratory WDL    Respiratory WDL rhythm/pattern    Rhythm/Pattern, Respiratory shortness of breath;tachypneic;labored       Skin Circulation/Temperature WDL    Skin Circulation/Temperature WDL WDL       Cardiac WDL    Cardiac WDL rhythm    Pulse Rate & Regularity tachycardic    Cardiac Rhythm Atrial fibrillation       Peripheral/Neurovascular WDL    Peripheral Neurovascular WDL WDL       Cognitive/Neuro/Behavioral WDL    Cognitive/Neuro/Behavioral WDL WDL

## 2023-03-26 NOTE — H&P
[facility]  Hospitalist H&P    Name: Lara Melendez      MRN: [id number]  YOB: 1965    Age: 57 year old  Date of admission: 3/26/2023  Primary care provider: Chyna Rust            Assessment and Plan:     Lara Melendez is a 57 year old female with PMH of chronic hypoxic respiratory failure due to COPD with recent exacerbations on 2 L/min oxygen at home at night, discharged from this hospital on 2/13/2023 after admitted for respiratory failure due to COPD, HTN, HLD, hypothyroidism, anxiety, pulmonary nodules and psoriasis who presentes to ED via EMS with progressively worsening shortness of breath for the last 5 days being admitted to the hospital with acute on chronic hypoxic respiratory failure secondary to COPD and found to have A-fib with RVR after she was given nebulized and admitted on 3/26/2023.    1. Acute hypoxemic respiratory failure secondary to COPD exacerbation.  2. Possible underlying pneumonia based on imaging.  -She has progressive cough and shortness of breath for the last 5 days.  -At baseline she uses 2 L of oxygen mostly at night.  -She continues to smoke about half pack per day.  -She has nebulizers and bronchodilators at home.  -Ordered nebulizers and bronchodilators scheduled and as needed.  -Treated with Doxycycline and Rocephin for now.  -Solu-Medrol 60 mg IV twice a day.  -Continue BiPAP support as needed.  -Close cardiac and pulse oximetry monitoring.  -Continue ICU care for today.  -CT scan PE protocol was done as D-dimer was elevated.  No PE but nonspecific bronchiolitis with bronchial wall thickening and endobronchial mucoid impaction with mild bibasilar airspace opacity possible developing pneumonia and emphysema there is also a left upper lobe 4 mm nodule  -No leukocytosis, no fever recorded, for now we will treat with antibiotic, will de-escalate if cultures remain negative.    3. Pulmonary nodule: Patient has elevated risk as she is a  smoker.  -She has a pulmonologist and will follow up as an outpatient.    4. A-fib with RVR, new diagnosis.  -Patient is a no prior history of A-fib.  -This was documented in the emergency room after she was given nebulizers.  -She is on Cardizem drip which will be continued.  -Ordered echocardiogram to be done in the morning.  -When rate is controlled we will transition to oral  medications    5. Hypertension:   -Monitor blood pressure Will resume losartan if blood pressure is elevated  6. Tobacco use disorder: Patient continues to smoke despite her worsening respiratory status, she is advised on smoking cessation.  She will have a nicotine patch.  7. Anxiety: Continue PTA medication including Effexor and low-dose clonazepam.  8.     Clinically Significant Risk Factors Present on Admission                  # Hypertension: home medication list includes antihypertensive(s)   # Non-Invasive mechanical ventilation: current O2 Device: BiPAP/CPAP  # Acute hypoxic respiratory failure: continue supplemental O2 as needed             Code status: Full code  Admit to ICU, risk of further deterioration  Prophylaxis: Lovenox    I discussed with patient at length the plan of care, and admission to the intensive care unit.  All her question and concerns addressed she showed understanding.  I discussed with intensivist  Total time critical care, face-to-face with patient coordinating her care is 50 minutes.          Chief Complaint:     Progressively worsening shortness of breath         History of Present Illness:   Lara Melendez is a 57 year old female with PMH chronic hypoxic respiratory failure due to COPD with recent exacerbations on 2 L/min oxygen at home at night, discharged from this hospital on 2/13/2023 after admitted for respiratory failure due to COPD, HTN, HLD, hypothyroidism, anxiety, pulmonary nodules and psoriasis who presentes to ED via EMS with progressively worsening shortness of breath for the last 5 days.   Does state that she has nonproductive cough which also got worse, and back pain associated with a cough.  She denied any fever or chills, no runny nose, sore throat, muscle ache or headache.  She noticed slight leg swelling but denied any lower extremity pain or redness.  She also denied any urine or bowel habit changes.  Patient stated she has nebulizers at home and did 3 nebulizers without improvement and called EMS.  EMS gave her 1 mg of midazolam and placed on BiPAP which improved her symptoms.     History was obtained from my discussion with the patient at the bedside.  I also discussed the case with the ED provider.  The electronic medical record was also reviewed.          Past Medical History:     Past Medical History:   Diagnosis Date     Anxiety      COPD (chronic obstructive pulmonary disease)      Hypercholesterolemia      Hypertension      Hypothyroidism              Past Surgical History:     Past Surgical History:   Procedure Laterality Date     CHOLECYSTECTOMY       INCISION AND DRAINAGE MANDIBLE, COMBINED Left 01/10/2022    Procedure: INCISION AND DRAINAGE, MANDIBLE;  Surgeon: Jn Feliz DDS;  Location: [facility] OR     INCISION AND DRAINAGE MANDIBLE, COMBINED Left 02/04/2022    Procedure: INCISION AND DRAINAGE, MANDIBLE;  Surgeon: Taylor Ortez DDS;  Location: [facility] OR     TOOTH EXTRACTION               Social History:     Social History     Tobacco Use     Smoking status: Never     Smokeless tobacco: Never   Substance Use Topics     Alcohol use: Yes     Comment: occ             Family History:   The family history was fully reviewed and non-contributory in this case.         Allergies:     Allergies   Allergen Reactions     Sulfa Drugs      Penicillins Rash     Generalized rash  Rash, Generalized               Medications:     Prior to Admission medications    Medication Sig Last Dose Taking? Auth Provider Long Term End Date   albuterol (PROAIR HFA/PROVENTIL HFA/VENTOLIN HFA) 108 (90 Base) MCG/ACT  inhaler Inhale 2 puffs into the lungs every 4 hours as needed for shortness of breath / dyspnea or wheezing Inhale 1-2 Puffs every 4 hours as needed for Wheezing. Unknown at PRN Yes Mark Olsen, DO Yes    albuterol (PROVENTIL) (2.5 MG/3ML) 0.083% neb solution Take 1 vial (2.5 mg) by nebulization every 4 hours as needed for shortness of breath or wheezing Unknown at PRN Yes Tommie Ren MD Yes 3/26/23   atorvastatin (LIPITOR) 20 MG tablet Take 20 mg by mouth daily 3/25/2023 Yes Reported, Patient Yes    clonazePAM (KLONOPIN) 0.5 MG tablet Take 0.5 mg by mouth daily 3/25/2023 Yes Unknown, Entered By History Yes    fluticasone-salmeterol (ADVAIR-HFA) 230-21 MCG/ACT inhaler Inhale 2 puffs into the lungs 2 times daily 3/25/2023 Yes Mark Olsen, DO Yes    ipratropium - albuterol 0.5 mg/2.5 mg/3 mL (DUONEB) 0.5-2.5 (3) MG/3ML neb solution Take 1 vial (3 mLs) by nebulization every 6 hours as needed for shortness of breath / dyspnea or wheezing Unknown at PRN Yes Morales Alva MD Yes    levothyroxine (SYNTHROID/LEVOTHROID) 112 MCG tablet Take 112 mcg by mouth daily 3/25/2023 Yes Reported, Patient Yes    losartan (COZAAR) 25 MG tablet Take 1 tablet (25 mg) by mouth daily 3/25/2023 Yes Kody Prieto DO Yes    PARoxetine (PAXIL) 20 MG tablet Take 20 mg by mouth daily 3/25/2023 Yes Unknown, Entered By History No    predniSONE (DELTASONE) 20 MG tablet Take 40 mg by mouth daily 3/25/2023 Yes Unknown, Entered By History     tiotropium (SPIRIVA RESPIMAT) 2.5 MCG/ACT inhaler Inhale 2 puffs into the lungs daily as needed Unknown at PRN Yes Unknown, Entered By History No              Review of Systems:     A Comprehensive greater than 10 system review of systems was carried out.  Pertinent positives and negatives are noted above.  Otherwise negative for contributory information.           Physical Exam:   Blood pressure (!) 115/91, pulse 116, temperature 97.9  F (36.6  C), temperature source Temporal, resp. rate (!)  "35, height 1.626 m (5' 4\"), weight 65.3 kg (143 lb 15.4 oz), SpO2 99 %.  Wt Readings from Last 1 Encounters:   03/26/23 65.3 kg (143 lb 15.4 oz)     Exam:  GENERAL: Awake and alert, in respiratory distress, fully oriented.  HEENT: Normocephalic, atraumatic. Extraocular movements intact.  CARDIOVASCULAR: S1 and S2 well heard, irregularly irregular, tachycardic, no murmur.  PULMONARY: Extensive bilateral wheezing, noted use of accessory muscles.  ABDOMINAL: Obese, soft, nontender, nondistended.   EXTREMITIES: No cyanosis or clubbing.  Trace bilateral lower extremity edema  NEUROLOGICAL: CN 2-12 grossly intact, no focal neurological deficits.  DERMATOLOGICAL: No rash, ulcer, bruising, nor jaundice.          Data:   EKG:  Personally reviewed.  A-fib with RVR at 40 bpm QTc is 453, nonspecific ST-T wave changes.    Laboratory:  Recent Labs   Lab 03/26/23  0618   WBC 5.8   HGB 11.3*   HCT 34.3*   *        Recent Labs   Lab 03/26/23  0618   NTBNPI 199     Recent Labs   Lab 03/26/23  1324 03/26/23  0618   * 89     No results for input(s): CULT in the last 168 hours.    Imaging:  Recent Results (from the past 24 hour(s))   XR Chest Port 1 View    Narrative    EXAM: CHEST SINGLE VIEW PORTABLE  LOCATION: [facility]  DATE/TIME: 03/26/2023, 6:32 AM    INDICATION: Shortness of breath.  COMPARISON: 02/11/2023.    FINDINGS: A few chronic-appearing interstitial opacities again noted within both lungs and likely relate to scarring. The lungs are otherwise clear. Normal-sized cardiac silhouette. Atherosclerotic calcification in the thoracic aorta.      Impression    IMPRESSION: No convincing evidence of active cardiopulmonary disease.      CT Chest Pulmonary Embolism w Contrast    Narrative    EXAM: CT CHEST PULMONARY EMBOLISM W CONTRAST  LOCATION: [facility]  DATE/TIME: 3/26/2023 9:06 AM    INDICATION: Shortness of breath, elevated ddimer  COMPARISON: CT exams " 02/11/2023, 10/26/2022 and 2/21/2022  TECHNIQUE: CT chest pulmonary angiogram during arterial phase injection of IV contrast. Multiplanar reformats and MIP reconstructions were performed. Dose reduction techniques were used.   CONTRAST: 53mL Isovue 370    FINDINGS:  ANGIOGRAM CHEST: Pulmonary arteries are normal caliber and negative for pulmonary emboli. Thoracic aorta is negative for dissection. No CT evidence of right heart strain.    LUNGS AND PLEURA: Minimal tracheal secretions. Mild to moderate upper lobar predominant emphysema. Bronchial wall thickening with multifocal endobronchial mucoid impaction. Mild scattered patchy consolidative and groundglass airspace opacities within the   bilateral lower lobes, lingula and right middle lobe. Stable incidental clustered calcified granulomas in the left lung apex. Stable 3 mm right apical nodule image 53 series 7, unchanged since 02/21/2022 and therefore considered benign. Newly visualized   small left upper lobe nodules including a dominant 4 mm nodule image 149 series 7. Clustered tree-in-bud nodules in the lingula likely reflect small airways disease. No pleural effusion.     MEDIASTINUM/AXILLAE: No thoracic adenopathy. Normal heart size and no pericardial effusion.    CORONARY ARTERY CALCIFICATION: Mild.    UPPER ABDOMEN: Cholecystectomy. Stable subtle nodular liver surface contour which may reflect cirrhosis.    MUSCULOSKELETAL: Mild spinal degenerative changes.      Impression    IMPRESSION:  1.  No pulmonary artery embolism.  2.  Nonspecific bronchiolitis with bronchial wall thickening and endobronchial mucoid impaction.  3.  Mild bibasilar airspace opacities suspicious for developing pneumonic infiltrates.  4.  Mild to moderate emphysema.  5.  Newly visualized left upper lobe 4 mm nodule, likely postinfectious or postinflammatory. Recommend follow-up per guidelines below.      REFERENCE:  Guidelines for Management of Incidental Pulmonary Nodules Detected on CT  Images: From the Fleischner Society 2017.   Guidelines apply to incidental nodules in patients who are 35 years or older.  Guidelines do not apply to lung cancer screening, patients with immunosuppression, or patients with known primary cancer.    SINGLE NODULE  Nodule size less than or equal to 6 mm  Low-risk patients: No follow-up needed.  High-risk patients: Optional follow-up at 12 months.             Suresh Jeter MD  [facility] Hospitalist  [address] [facility] [place] [address]  03/26/2023

## 2023-03-26 NOTE — PROGRESS NOTES
Notified glucose is elevated to 344.  Patient is on Steriod.  -NPH 8 units BID.  -sliding scale insulin  -Novolog pre meal 4 units TID ordered.  -Adjust insulin as needed,

## 2023-03-26 NOTE — PHARMACY-ADMISSION MEDICATION HISTORY
Admission medication history interview status for this patient is complete. See [facility] admission navigator for allergy information, prior to admission medications and immunization status.     Medication history interview done, indicate source(s): Patient  Medication history resources (including written lists, pill bottles, clinic record): Patient med list, Vantrix, and Bioparaiso  Pharmacy: [facility] [id number] [place] [facility] [id number] [facility]      Changes made to PTA medication list:  Added: Prednisone  Changed: None  Reported as Not Taking: None  Removed: Venlafaxine, Guaifenesin    Actions taken by pharmacist (provider contacted, etc):None     Additional medication history information: Patient verbalized medications and then provided list on phone. Informed that list on phone was current medications.     Medication reconciliation/reorder completed by provider prior to medication history?  N   (Y/N)     Medication Affordability:        Prior to Admission medications    Medication Sig Last Dose Taking? Auth Provider Long Term End Date   albuterol (PROAIR HFA/PROVENTIL HFA/VENTOLIN HFA) 108 (90 Base) MCG/ACT inhaler Inhale 2 puffs into the lungs every 4 hours as needed for shortness of breath / dyspnea or wheezing Inhale 1-2 Puffs every 4 hours as needed for Wheezing. Unknown at PRN Yes Mark Olsen, DO Yes    albuterol (PROVENTIL) (2.5 MG/3ML) 0.083% neb solution Take 1 vial (2.5 mg) by nebulization every 4 hours as needed for shortness of breath or wheezing Unknown Yes Tommie Ren MD Yes 3/26/23   atorvastatin (LIPITOR) 20 MG tablet Take 20 mg by mouth daily 3/25/2023 Yes Reported, Patient Yes    clonazePAM (KLONOPIN) 0.5 MG tablet Take 0.5 mg by mouth daily 3/25/2023 Yes Unknown, Entered By History Yes    fluticasone-salmeterol (ADVAIR-HFA) 230-21 MCG/ACT inhaler Inhale 2 puffs into the lungs 2 times daily 3/25/2023 Yes Mark Olsen, DO Yes    ipratropium - albuterol 0.5 mg/2.5 mg/3 mL (DUONEB) 0.5-2.5 (3)  MG/3ML neb solution Take 1 vial (3 mLs) by nebulization every 6 hours as needed for shortness of breath / dyspnea or wheezing Unknown at PRN Yes Morales Alva MD Yes    levothyroxine (SYNTHROID/LEVOTHROID) 112 MCG tablet Take 112 mcg by mouth daily 3/25/2023 Yes Reported, Patient Yes    losartan (COZAAR) 25 MG tablet Take 1 tablet (25 mg) by mouth daily 3/25/2023 Yes Kody Prieto DO Yes    PARoxetine (PAXIL) 20 MG tablet Take 20 mg by mouth daily 3/25/2023 Yes Unknown, Entered By History No    predniSONE (DELTASONE) 20 MG tablet Take 40 mg by mouth daily 3/25/2023 Yes Unknown, Entered By History     tiotropium (SPIRIVA RESPIMAT) 2.5 MCG/ACT inhaler Inhale 2 puffs into the lungs daily as needed Unknown at PRN Yes Unknown, Entered By History No

## 2023-03-27 ENCOUNTER — APPOINTMENT (OUTPATIENT)
Dept: CARDIOLOGY | Facility: CLINIC | Age: 58
End: 2023-03-27
Attending: INTERNAL MEDICINE
Payer: COMMERCIAL

## 2023-03-27 LAB
ATRIAL RATE - MUSE: 220 BPM
DIASTOLIC BLOOD PRESSURE - MUSE: NORMAL MMHG
GLUCOSE BLDC GLUCOMTR-MCNC: 114 MG/DL (ref 70–99)
GLUCOSE BLDC GLUCOMTR-MCNC: 144 MG/DL (ref 70–99)
GLUCOSE BLDC GLUCOMTR-MCNC: 153 MG/DL (ref 70–99)
GLUCOSE BLDC GLUCOMTR-MCNC: 170 MG/DL (ref 70–99)
GLUCOSE BLDC GLUCOMTR-MCNC: 258 MG/DL (ref 70–99)
GLUCOSE BLDC GLUCOMTR-MCNC: 82 MG/DL (ref 70–99)
INTERPRETATION ECG - MUSE: NORMAL
LVEF ECHO: NORMAL
P AXIS - MUSE: NORMAL DEGREES
PR INTERVAL - MUSE: NORMAL MS
QRS DURATION - MUSE: 84 MS
QT - MUSE: 290 MS
QTC - MUSE: 453 MS
R AXIS - MUSE: 49 DEGREES
SYSTOLIC BLOOD PRESSURE - MUSE: NORMAL MMHG
T AXIS - MUSE: -14 DEGREES
VENTRICULAR RATE- MUSE: 147 BPM

## 2023-03-27 PROCEDURE — 999N000208 ECHOCARDIOGRAM COMPLETE

## 2023-03-27 PROCEDURE — 93306 TTE W/DOPPLER COMPLETE: CPT | Mod: 26 | Performed by: INTERNAL MEDICINE

## 2023-03-27 PROCEDURE — 250N000009 HC RX 250: Performed by: INTERNAL MEDICINE

## 2023-03-27 PROCEDURE — 999N000157 HC STATISTIC RCP TIME EA 10 MIN

## 2023-03-27 PROCEDURE — 250N000011 HC RX IP 250 OP 636: Performed by: INTERNAL MEDICINE

## 2023-03-27 PROCEDURE — 200N000001 HC R&B ICU

## 2023-03-27 PROCEDURE — 94640 AIRWAY INHALATION TREATMENT: CPT

## 2023-03-27 PROCEDURE — 250N000012 HC RX MED GY IP 250 OP 636 PS 637: Performed by: INTERNAL MEDICINE

## 2023-03-27 PROCEDURE — 255N000002 HC RX 255 OP 636: Performed by: INTERNAL MEDICINE

## 2023-03-27 PROCEDURE — 258N000003 HC RX IP 258 OP 636: Performed by: INTERNAL MEDICINE

## 2023-03-27 PROCEDURE — 94660 CPAP INITIATION&MGMT: CPT

## 2023-03-27 PROCEDURE — 250N000013 HC RX MED GY IP 250 OP 250 PS 637: Performed by: INTERNAL MEDICINE

## 2023-03-27 PROCEDURE — 99233 SBSQ HOSP IP/OBS HIGH 50: CPT | Performed by: INTERNAL MEDICINE

## 2023-03-27 PROCEDURE — 94640 AIRWAY INHALATION TREATMENT: CPT | Mod: 76

## 2023-03-27 RX ORDER — FLUTICASONE FUROATE AND VILANTEROL 200; 25 UG/1; UG/1
1 POWDER RESPIRATORY (INHALATION) DAILY
Status: DISCONTINUED | OUTPATIENT
Start: 2023-03-27 | End: 2023-04-01 | Stop reason: HOSPADM

## 2023-03-27 RX ORDER — DILTIAZEM HYDROCHLORIDE 30 MG/1
30 TABLET, FILM COATED ORAL EVERY 6 HOURS SCHEDULED
Status: DISCONTINUED | OUTPATIENT
Start: 2023-03-27 | End: 2023-03-28

## 2023-03-27 RX ADMIN — METHYLPREDNISOLONE SODIUM SUCCINATE 40 MG: 40 INJECTION, POWDER, FOR SOLUTION INTRAMUSCULAR; INTRAVENOUS at 06:22

## 2023-03-27 RX ADMIN — IPRATROPIUM BROMIDE AND ALBUTEROL SULFATE 3 ML: .5; 3 SOLUTION RESPIRATORY (INHALATION) at 19:18

## 2023-03-27 RX ADMIN — IPRATROPIUM BROMIDE AND ALBUTEROL SULFATE 3 ML: .5; 3 SOLUTION RESPIRATORY (INHALATION) at 15:01

## 2023-03-27 RX ADMIN — METHYLPREDNISOLONE SODIUM SUCCINATE 40 MG: 40 INJECTION, POWDER, FOR SOLUTION INTRAMUSCULAR; INTRAVENOUS at 13:00

## 2023-03-27 RX ADMIN — INSULIN ASPART 4 UNITS: 100 INJECTION, SOLUTION INTRAVENOUS; SUBCUTANEOUS at 18:07

## 2023-03-27 RX ADMIN — IPRATROPIUM BROMIDE AND ALBUTEROL SULFATE 3 ML: .5; 3 SOLUTION RESPIRATORY (INHALATION) at 02:12

## 2023-03-27 RX ADMIN — DILTIAZEM HYDROCHLORIDE 30 MG: 30 TABLET, FILM COATED ORAL at 18:06

## 2023-03-27 RX ADMIN — INSULIN HUMAN 8 UNITS: 100 INJECTION, SUSPENSION SUBCUTANEOUS at 10:01

## 2023-03-27 RX ADMIN — LEVOTHYROXINE SODIUM 112 MCG: 0.11 TABLET ORAL at 09:09

## 2023-03-27 RX ADMIN — CEFTRIAXONE 2 G: 2 INJECTION, POWDER, FOR SOLUTION INTRAMUSCULAR; INTRAVENOUS at 09:08

## 2023-03-27 RX ADMIN — ENOXAPARIN SODIUM 40 MG: 40 INJECTION SUBCUTANEOUS at 09:10

## 2023-03-27 RX ADMIN — DOXYCYCLINE 100 MG: 100 INJECTION, POWDER, LYOPHILIZED, FOR SOLUTION INTRAVENOUS at 20:16

## 2023-03-27 RX ADMIN — HUMAN ALBUMIN MICROSPHERES AND PERFLUTREN 6 ML: 10; .22 INJECTION, SOLUTION INTRAVENOUS at 10:57

## 2023-03-27 RX ADMIN — CLONAZEPAM 0.5 MG: 0.5 TABLET ORAL at 09:09

## 2023-03-27 RX ADMIN — IPRATROPIUM BROMIDE AND ALBUTEROL SULFATE 3 ML: .5; 3 SOLUTION RESPIRATORY (INHALATION) at 18:24

## 2023-03-27 RX ADMIN — INSULIN ASPART 4 UNITS: 100 INJECTION, SOLUTION INTRAVENOUS; SUBCUTANEOUS at 10:02

## 2023-03-27 RX ADMIN — DICLOFENAC SODIUM 2 G: 10 GEL TOPICAL at 12:49

## 2023-03-27 RX ADMIN — DILTIAZEM HYDROCHLORIDE 10 MG/HR: 5 INJECTION, SOLUTION INTRAVENOUS at 03:44

## 2023-03-27 RX ADMIN — DILTIAZEM HYDROCHLORIDE 30 MG: 30 TABLET, FILM COATED ORAL at 12:49

## 2023-03-27 RX ADMIN — INSULIN HUMAN 8 UNITS: 100 INJECTION, SUSPENSION SUBCUTANEOUS at 18:07

## 2023-03-27 RX ADMIN — GUAIFENESIN AND DEXTROMETHORPHAN HYDROBROMIDE 1 TABLET: 30; 600 TABLET, EXTENDED RELEASE ORAL at 09:25

## 2023-03-27 RX ADMIN — PAROXETINE HYDROCHLORIDE 20 MG: 20 TABLET, FILM COATED ORAL at 09:10

## 2023-03-27 RX ADMIN — METHYLPREDNISOLONE SODIUM SUCCINATE 40 MG: 40 INJECTION, POWDER, FOR SOLUTION INTRAMUSCULAR; INTRAVENOUS at 22:22

## 2023-03-27 RX ADMIN — DICLOFENAC SODIUM 2 G: 10 GEL TOPICAL at 22:22

## 2023-03-27 RX ADMIN — IPRATROPIUM BROMIDE AND ALBUTEROL SULFATE 3 ML: .5; 3 SOLUTION RESPIRATORY (INHALATION) at 07:45

## 2023-03-27 RX ADMIN — DOXYCYCLINE 100 MG: 100 INJECTION, POWDER, LYOPHILIZED, FOR SOLUTION INTRAVENOUS at 09:06

## 2023-03-27 RX ADMIN — DICLOFENAC SODIUM 2 G: 10 GEL TOPICAL at 18:06

## 2023-03-27 RX ADMIN — NICOTINE 1 PATCH: 21 PATCH, EXTENDED RELEASE TRANSDERMAL at 09:11

## 2023-03-27 RX ADMIN — ACETAMINOPHEN 650 MG: 325 TABLET, FILM COATED ORAL at 09:23

## 2023-03-27 RX ADMIN — FLUTICASONE FUROATE AND VILANTEROL TRIFENATATE 1 PUFF: 200; 25 POWDER RESPIRATORY (INHALATION) at 12:50

## 2023-03-27 RX ADMIN — DICLOFENAC SODIUM 2 G: 10 GEL TOPICAL at 09:10

## 2023-03-27 RX ADMIN — GUAIFENESIN AND DEXTROMETHORPHAN HYDROBROMIDE 1 TABLET: 30; 600 TABLET, EXTENDED RELEASE ORAL at 20:16

## 2023-03-27 ASSESSMENT — ACTIVITIES OF DAILY LIVING (ADL)
ADLS_ACUITY_SCORE: 24
ADLS_ACUITY_SCORE: 27
ADLS_ACUITY_SCORE: 24
ADLS_ACUITY_SCORE: 27
ADLS_ACUITY_SCORE: 24
ADLS_ACUITY_SCORE: 27
ADLS_ACUITY_SCORE: 24

## 2023-03-27 NOTE — CONSULTS
Patient has [employer] ([employer]) through an employer.    Xarelto/Eliquis:  $40/mo. Upon receipt of RX Discharge Pharmacy can provide copay savings card from [employer] or [email or web address], respectively, to reduce this to $10/mo.    Krissy Banegas  Pharmacy Technician/Liaison, Discharge Pharmacy   [phone] (voice or text)  [email or web address]

## 2023-03-27 NOTE — PLAN OF CARE
Goal Outcome Evaluation:      Plan of Care Reviewed With: patient, spouse    ICU End of Shift Summary.  For vital signs and complete assessments, please see documentation flowsheets.      Pertinent assessments: A&Ox4. Denies pain. Afebrile. Tele shows Afib; diltiazem ordered. BPs stable. On/off BiPAP and 3L NC. Severe BLUE. Expiratory wheezing throughout; nebs and inhaler ordered. Regular diet and tolerating. Voiding without any complications. Abdomen with slight distention. BS+. Stand-by assist x1 to commode.    Major Shift Events: -Oral diltiazem started and diltiazem drip turned off; tolerating thus far.                                     -Wheezing and severe BLUE. BiPAP on/off throughout day. Inhaler and nebs given                                            -BG remains elevated.       Plan (Upcoming Events): Wean off BiPAP as able. Monitor HR and effectiveness of meds.   Discharge/Transfer Needs: TBD     Bedside Shift Report Completed : yes  Bedside Safety Check Completed: yes

## 2023-03-27 NOTE — PROGRESS NOTES
[facility]  Hospitalist Progress Note  Suresh Jeter MD 03/27/2023    Reason for Stay (Diagnosis): Respiratory failure         Assessment and Plan:      Summary of Stay: Lara Melendez is a 57 year old female Lara Melendez is a 57 year old female with PMH of chronic hypoxic respiratory failure due to COPD with recent exacerbations on 2 L/min oxygen at home at night, discharged from this hospital on 2/13/2023 after admitted for respiratory failure due to COPD, HTN, HLD, hypothyroidism, anxiety, pulmonary nodules and psoriasis who presented to ED via EMS with progressively worsening shortness of breath for the last 5 days  and admitted on 3/26/2023 with acute hypoxic respiratory failure secondary to COPD exacerbation and also found to have A-fib with RVR.     1. Acute hypoxemic respiratory failure secondary to COPD exacerbation.  2. Possible underlying pneumonia based on imaging.  -PTA she was on 2 L of oxygen mostly at night.  -She continues to smoke about half pack per day.  -Continue bronchodilators, nebulizers and inhalers.  -Continue IV antibiotic doxycycline and Rocephin  -Continue Solu-Medrol 60 mg IV twice a day.  -Continue BiPAP support as needed, try to wean her off when possible.  -Continue ICU care for today, she is slightly improving, still risk of further deceleration.  -CT scan PE protocol negative for PE but showed bronchiolitis with bronchial wall thickening and endobronchial mucoid impaction with mild bibasilar airspace opacity possible developing pneumonia and emphysema there is also a left upper lobe 4 mm nodule  -Follow-up culture results,.  -May de-escalate antibiotics if cultures remain negative.     3. Pulmonary nodule: Patient is at increased risk as she is a smoker.  -She has a pulmonologist and will follow up as an outpatient.     4. A-fib with RVR, new diagnosis.  -Patient has no prior history of A-fib.  -She remained in A-fib, currently rate  "controlled.  -[id number] score is at least 2, echo pending.  -She benefits from full anticoagulation.  -Ask pharmacy liaison to check for oral anticoagulation price out-of-pocket.  -Try to wean her down off Cardizem drip.  -Start Cardizem 30 mg every 6 hours for now, if rate is controlled consider changing to Cardizem CD in the morning  -Hold off starting beta-blocker due to severe COPD.     5. Hypertension:   -Monitor blood pressure.  -Started on Cardizem which also help with blood pressure .  -We will hold Losartan to get room for rate control medication.    6. Tobacco use disorder: Patient continues to smoke despite her worsening respiratory status.  -Smoking cessation counseled.  -Nicotine patch ordered.    7. Anxiety: Continue PTA medication including Effexor and low-dose clonazepam.    8.       DVT Prophylaxis: Pneumatic Compression Devices when he started on DOAC after we hear from pharmacist for coverage  Code Status: Full Code  Discharge Dispo: Continue ICU care for today, she remained on BiPAP  Estimated Disch Date / # of Days until Disch: Expect in the hospital for 2 to 3 days more    I discussed with patient at length the plan of care, all her question and concerns addressed.  Also discussed with ICU team.  Total time spent 50 minutes, reviewing charts, examining the patient's, coordinating her care as documented.        Interval History (Subjective):      Patient seen and examined, admitted on BiPAP due to acute respiratory failure, continue to require BiPAP intermittently, denied any chest pain, shortness of breath improving.  Back pain is also better                  Physical Exam:      Last Vital Signs:  /83   Pulse 87   Temp 98.4  F (36.9  C) (Axillary)   Resp (!) 34   Ht 1.626 m (5' 4\")   Wt 65.3 kg (143 lb 15.4 oz)   SpO2 98%   BMI 24.71 kg/m      I/O last 3 completed shifts:  In: 1920 [P.O.:1920]  Out: -   Vitals:    03/26/23 1317   Weight: 65.3 kg (143 lb 15.4 oz)     Current " Facility-Administered Medications   Medication     acetaminophen (TYLENOL) Suppository 650 mg     acetaminophen (TYLENOL) tablet 650 mg     cefTRIAXone (ROCEPHIN) 2 g vial to attach to  ml bag for ADULTS or NS 50 ml bag for PEDS     clonazePAM (klonoPIN) tablet 0.5 mg     cyclobenzaprine (FLEXERIL) tablet 5 mg     dextromethorphan-guaiFENesin (MUCINEX DM)  MG per 12 hr tablet 1 tablet     glucose gel 15-30 g    Or     dextrose 50 % injection 25-50 mL    Or     glucagon injection 1 mg     glucose gel 15-30 g    Or     dextrose 50 % injection 25-50 mL    Or     glucagon injection 1 mg     diclofenac (VOLTAREN) 1 % topical gel 2 g     diltiazem (CARDIZEM) 125 mg in sodium chloride 0.9 % 125 mL infusion     diltiazem (CARDIZEM) tablet 30 mg     doxycycline (VIBRAMYCIN) 100 mg vial to attach to  mL bag     enoxaparin ANTICOAGULANT (LOVENOX) injection 40 mg     fluticasone-vilanterol (BREO ELLIPTA) 200-25 MCG/ACT inhaler 1 puff     insulin aspart (NovoLOG) injection (RAPID ACTING)     insulin aspart (NovoLOG) injection (RAPID ACTING)     insulin aspart (NovoLOG) injection (RAPID ACTING)     insulin NPH injection 8 Units     ipratropium - albuterol 0.5 mg/2.5 mg/3 mL (DUONEB) neb solution 3 mL     ipratropium - albuterol 0.5 mg/2.5 mg/3 mL (DUONEB) neb solution 3 mL     levothyroxine (SYNTHROID/LEVOTHROID) tablet 112 mcg     methylPREDNISolone sodium succinate (solu-MEDROL) injection 40 mg     nicotine (NICODERM CQ) 21 MG/24HR 24 hr patch 1 patch     nicotine Patch in Place     ondansetron (ZOFRAN ODT) ODT tab 4 mg    Or     ondansetron (ZOFRAN) injection 4 mg     PARoxetine (PAXIL) tablet 20 mg     polyethylene glycol (MIRALAX) Packet 17 g     prochlorperazine (COMPAZINE) injection 10 mg    Or     prochlorperazine (COMPAZINE) tablet 10 mg    Or     prochlorperazine (COMPAZINE) suppository 25 mg     senna-docusate (SENOKOT-S/PERICOLACE) 8.6-50 MG per tablet 1 tablet    Or     senna-docusate  (SENOKOT-S/PERICOLACE) 8.6-50 MG per tablet 2 tablet       Constitutional: Awake, alert, cooperative, no apparent distress   Respiratory: Clear to auscultation bilaterally, no crackles or wheezing   Cardiovascular: Regular rate and rhythm, normal S1 and S2, and no murmur noted   Abdomen: Normal bowel sounds, soft, non-distended, non-tender   Skin: No rashes, no cyanosis, dry to touch   Neuro: Alert and oriented x3, no weakness, numbness, memory loss   Extremities: No edema, normal range of motion.   Other(s):HEENT Pink, un icteric, moist mucosa,       All other systems: Negative.          Medications:      All current medications were reviewed with changes reflected in problem list.         Data:      All new lab and imaging data was reviewed.   Labs:  Recent Labs   Lab 03/27/23  0845 03/27/23  0157 03/26/23 2123 03/26/23  1324 03/26/23  0618   NA  --   --   --   --  141   POTASSIUM  --   --   --   --  4.3   CHLORIDE  --   --   --   --  104   CO2  --   --   --   --  25   ANIONGAP  --   --   --   --  12   * 144* 115*   < > 89   BUN  --   --   --   --  5.7*   CR  --   --   --   --  0.66   GFRESTIMATED  --   --   --   --  >90   EDIN  --   --   --   --  9.0    < > = values in this interval not displayed.     Recent Labs   Lab 03/26/23  0618   WBC 5.8   HGB 11.3*   HCT 34.3*   *        Recent Labs   Lab 03/27/23  0845 03/27/23  0157 03/26/23 2123 03/26/23  1811 03/26/23  1612   * 144* 115* 179* 344*      Imaging:   Results for orders placed or performed during the hospital encounter of 03/26/23   XR Chest Port 1 View    Narrative    EXAM: CHEST SINGLE VIEW PORTABLE  LOCATION: [facility]  DATE/TIME: 03/26/2023, 6:32 AM    INDICATION: Shortness of breath.  COMPARISON: 02/11/2023.    FINDINGS: A few chronic-appearing interstitial opacities again noted within both lungs and likely relate to scarring. The lungs are otherwise clear. Normal-sized cardiac silhouette.  Atherosclerotic calcification in the thoracic aorta.      Impression    IMPRESSION: No convincing evidence of active cardiopulmonary disease.      CT Chest Pulmonary Embolism w Contrast    Narrative    EXAM: CT CHEST PULMONARY EMBOLISM W CONTRAST  LOCATION: [facility]  DATE/TIME: 3/26/2023 9:06 AM    INDICATION: Shortness of breath, elevated ddimer  COMPARISON: CT exams 02/11/2023, 10/26/2022 and 2/21/2022  TECHNIQUE: CT chest pulmonary angiogram during arterial phase injection of IV contrast. Multiplanar reformats and MIP reconstructions were performed. Dose reduction techniques were used.   CONTRAST: 53mL Isovue 370    FINDINGS:  ANGIOGRAM CHEST: Pulmonary arteries are normal caliber and negative for pulmonary emboli. Thoracic aorta is negative for dissection. No CT evidence of right heart strain.    LUNGS AND PLEURA: Minimal tracheal secretions. Mild to moderate upper lobar predominant emphysema. Bronchial wall thickening with multifocal endobronchial mucoid impaction. Mild scattered patchy consolidative and groundglass airspace opacities within the   bilateral lower lobes, lingula and right middle lobe. Stable incidental clustered calcified granulomas in the left lung apex. Stable 3 mm right apical nodule image 53 series 7, unchanged since 02/21/2022 and therefore considered benign. Newly visualized   small left upper lobe nodules including a dominant 4 mm nodule image 149 series 7. Clustered tree-in-bud nodules in the lingula likely reflect small airways disease. No pleural effusion.     MEDIASTINUM/AXILLAE: No thoracic adenopathy. Normal heart size and no pericardial effusion.    CORONARY ARTERY CALCIFICATION: Mild.    UPPER ABDOMEN: Cholecystectomy. Stable subtle nodular liver surface contour which may reflect cirrhosis.    MUSCULOSKELETAL: Mild spinal degenerative changes.      Impression    IMPRESSION:  1.  No pulmonary artery embolism.  2.  Nonspecific bronchiolitis with bronchial  wall thickening and endobronchial mucoid impaction.  3.  Mild bibasilar airspace opacities suspicious for developing pneumonic infiltrates.  4.  Mild to moderate emphysema.  5.  Newly visualized left upper lobe 4 mm nodule, likely postinfectious or postinflammatory. Recommend follow-up per guidelines below.      REFERENCE:  Guidelines for Management of Incidental Pulmonary Nodules Detected on CT Images: From the Fleischner Society 2017.   Guidelines apply to incidental nodules in patients who are 35 years or older.  Guidelines do not apply to lung cancer screening, patients with immunosuppression, or patients with known primary cancer.    SINGLE NODULE  Nodule size less than or equal to 6 mm  Low-risk patients: No follow-up needed.  High-risk patients: Optional follow-up at 12 months.

## 2023-03-27 NOTE — PROGRESS NOTES
Pt has been on and off BIPAP for SOB.   Currently on BIPAP.  Current BIPAP settings 12/6, RR 14, FIO2: 30% with the UTN mask.   Skin integrity: good, intact.     BS-exp wheezing. Getting Duoneb q6hr and BREO daily.    When off BIPAP, on 3LNC.     RT to follow.

## 2023-03-27 NOTE — PROGRESS NOTES
ICU End of Shift Summary.  For vital signs and complete assessments, please see documentation flowsheets.      Pertinent assessments:   Neuro: A & O X 4.   Cardiac: Afib  Resp: LS, expiratory and inspiratory wheezes. NC 3 LPM most the noc, uses Bipap when using the bedside commode.   GI: Abd distended, pt denies pain or discomfort when palpation. Pt stated she recently had an episode of diverticulitis exacerbation.  :Adequate output, bedside commode.   Skin: scattered bruising. Psoriasis breakout on paulo LE  Lines:PIV X 2  Drips:Dilt    Plan (Upcoming Events): Cont Cardizem gtt,   Discharge/Transfer Needs:  TBD     Bedside Shift Report Completed : yes  Bedside Safety Check Completed: yes

## 2023-03-28 LAB
GLUCOSE BLDC GLUCOMTR-MCNC: 131 MG/DL (ref 70–99)
GLUCOSE BLDC GLUCOMTR-MCNC: 135 MG/DL (ref 70–99)
GLUCOSE BLDC GLUCOMTR-MCNC: 145 MG/DL (ref 70–99)
GLUCOSE BLDC GLUCOMTR-MCNC: 152 MG/DL (ref 70–99)
GLUCOSE BLDC GLUCOMTR-MCNC: 154 MG/DL (ref 70–99)
GLUCOSE BLDC GLUCOMTR-MCNC: 195 MG/DL (ref 70–99)

## 2023-03-28 PROCEDURE — 250N000009 HC RX 250: Performed by: INTERNAL MEDICINE

## 2023-03-28 PROCEDURE — 250N000013 HC RX MED GY IP 250 OP 250 PS 637: Performed by: HOSPITALIST

## 2023-03-28 PROCEDURE — 99233 SBSQ HOSP IP/OBS HIGH 50: CPT | Performed by: HOSPITALIST

## 2023-03-28 PROCEDURE — 258N000003 HC RX IP 258 OP 636: Performed by: HOSPITALIST

## 2023-03-28 PROCEDURE — 250N000013 HC RX MED GY IP 250 OP 250 PS 637: Performed by: INTERNAL MEDICINE

## 2023-03-28 PROCEDURE — 94640 AIRWAY INHALATION TREATMENT: CPT | Mod: 76

## 2023-03-28 PROCEDURE — 999N000157 HC STATISTIC RCP TIME EA 10 MIN

## 2023-03-28 PROCEDURE — 94799 UNLISTED PULMONARY SVC/PX: CPT

## 2023-03-28 PROCEDURE — 200N000001 HC R&B ICU

## 2023-03-28 PROCEDURE — 250N000011 HC RX IP 250 OP 636: Performed by: INTERNAL MEDICINE

## 2023-03-28 PROCEDURE — 250N000011 HC RX IP 250 OP 636: Performed by: HOSPITALIST

## 2023-03-28 PROCEDURE — 94640 AIRWAY INHALATION TREATMENT: CPT

## 2023-03-28 PROCEDURE — 250N000009 HC RX 250: Performed by: HOSPITALIST

## 2023-03-28 PROCEDURE — 272N000202 HC AEROBIKA WITH MANOMETER

## 2023-03-28 PROCEDURE — 94660 CPAP INITIATION&MGMT: CPT

## 2023-03-28 RX ORDER — IPRATROPIUM BROMIDE AND ALBUTEROL SULFATE 2.5; .5 MG/3ML; MG/3ML
3 SOLUTION RESPIRATORY (INHALATION) EVERY 4 HOURS
Status: DISCONTINUED | OUTPATIENT
Start: 2023-03-28 | End: 2023-03-31

## 2023-03-28 RX ORDER — AZITHROMYCIN 500 MG/5ML
500 INJECTION, POWDER, LYOPHILIZED, FOR SOLUTION INTRAVENOUS ONCE
Status: COMPLETED | OUTPATIENT
Start: 2023-03-28 | End: 2023-03-28

## 2023-03-28 RX ORDER — ATORVASTATIN CALCIUM 20 MG/1
20 TABLET, FILM COATED ORAL DAILY
Status: DISCONTINUED | OUTPATIENT
Start: 2023-03-28 | End: 2023-04-01 | Stop reason: HOSPADM

## 2023-03-28 RX ORDER — AZITHROMYCIN 250 MG/1
250 TABLET, FILM COATED ORAL DAILY
Status: COMPLETED | OUTPATIENT
Start: 2023-03-29 | End: 2023-04-01

## 2023-03-28 RX ORDER — DILTIAZEM HYDROCHLORIDE 30 MG/1
60 TABLET, FILM COATED ORAL EVERY 6 HOURS SCHEDULED
Status: DISCONTINUED | OUTPATIENT
Start: 2023-03-28 | End: 2023-03-29

## 2023-03-28 RX ORDER — SODIUM CHLORIDE FOR INHALATION 3 %
3 VIAL, NEBULIZER (ML) INHALATION 4 TIMES DAILY
Status: DISCONTINUED | OUTPATIENT
Start: 2023-03-28 | End: 2023-04-01 | Stop reason: HOSPADM

## 2023-03-28 RX ADMIN — DOXYCYCLINE 100 MG: 100 INJECTION, POWDER, LYOPHILIZED, FOR SOLUTION INTRAVENOUS at 09:32

## 2023-03-28 RX ADMIN — IPRATROPIUM BROMIDE AND ALBUTEROL SULFATE 3 ML: .5; 3 SOLUTION RESPIRATORY (INHALATION) at 13:45

## 2023-03-28 RX ADMIN — IPRATROPIUM BROMIDE AND ALBUTEROL SULFATE 3 ML: .5; 3 SOLUTION RESPIRATORY (INHALATION) at 02:33

## 2023-03-28 RX ADMIN — IPRATROPIUM BROMIDE AND ALBUTEROL SULFATE 3 ML: .5; 3 SOLUTION RESPIRATORY (INHALATION) at 23:50

## 2023-03-28 RX ADMIN — METHYLPREDNISOLONE SODIUM SUCCINATE 40 MG: 40 INJECTION, POWDER, FOR SOLUTION INTRAMUSCULAR; INTRAVENOUS at 05:59

## 2023-03-28 RX ADMIN — CEFTRIAXONE 2 G: 2 INJECTION, POWDER, FOR SOLUTION INTRAMUSCULAR; INTRAVENOUS at 08:51

## 2023-03-28 RX ADMIN — APIXABAN 5 MG: 5 TABLET, FILM COATED ORAL at 08:50

## 2023-03-28 RX ADMIN — IPRATROPIUM BROMIDE AND ALBUTEROL SULFATE 3 ML: .5; 3 SOLUTION RESPIRATORY (INHALATION) at 15:44

## 2023-03-28 RX ADMIN — IPRATROPIUM BROMIDE AND ALBUTEROL SULFATE 3 ML: .5; 3 SOLUTION RESPIRATORY (INHALATION) at 07:33

## 2023-03-28 RX ADMIN — DICLOFENAC SODIUM 2 G: 10 GEL TOPICAL at 21:53

## 2023-03-28 RX ADMIN — FLUTICASONE FUROATE AND VILANTEROL TRIFENATATE 1 PUFF: 200; 25 POWDER RESPIRATORY (INHALATION) at 08:58

## 2023-03-28 RX ADMIN — LEVOTHYROXINE SODIUM 112 MCG: 0.11 TABLET ORAL at 08:50

## 2023-03-28 RX ADMIN — ATORVASTATIN CALCIUM 20 MG: 20 TABLET, FILM COATED ORAL at 11:23

## 2023-03-28 RX ADMIN — METHYLPREDNISOLONE SODIUM SUCCINATE 40 MG: 40 INJECTION, POWDER, FOR SOLUTION INTRAMUSCULAR; INTRAVENOUS at 21:52

## 2023-03-28 RX ADMIN — AZITHROMYCIN MONOHYDRATE 500 MG: 500 INJECTION, POWDER, LYOPHILIZED, FOR SOLUTION INTRAVENOUS at 16:02

## 2023-03-28 RX ADMIN — DILTIAZEM HYDROCHLORIDE 60 MG: 30 TABLET, FILM COATED ORAL at 11:23

## 2023-03-28 RX ADMIN — SODIUM CHLORIDE SOLN NEBU 3% 3 ML: 3 NEBU SOLN at 15:44

## 2023-03-28 RX ADMIN — INSULIN HUMAN 8 UNITS: 100 INJECTION, SUSPENSION SUBCUTANEOUS at 16:19

## 2023-03-28 RX ADMIN — NICOTINE 1 PATCH: 21 PATCH, EXTENDED RELEASE TRANSDERMAL at 08:50

## 2023-03-28 RX ADMIN — GUAIFENESIN AND DEXTROMETHORPHAN HYDROBROMIDE 1 TABLET: 30; 600 TABLET, EXTENDED RELEASE ORAL at 20:33

## 2023-03-28 RX ADMIN — DICLOFENAC SODIUM 2 G: 10 GEL TOPICAL at 14:16

## 2023-03-28 RX ADMIN — ACETAMINOPHEN 650 MG: 325 TABLET, FILM COATED ORAL at 17:36

## 2023-03-28 RX ADMIN — IPRATROPIUM BROMIDE AND ALBUTEROL SULFATE 3 ML: .5; 3 SOLUTION RESPIRATORY (INHALATION) at 19:26

## 2023-03-28 RX ADMIN — DILTIAZEM HYDROCHLORIDE 60 MG: 30 TABLET, FILM COATED ORAL at 17:36

## 2023-03-28 RX ADMIN — INSULIN HUMAN 8 UNITS: 100 INJECTION, SUSPENSION SUBCUTANEOUS at 08:51

## 2023-03-28 RX ADMIN — CLONAZEPAM 0.5 MG: 0.5 TABLET ORAL at 08:50

## 2023-03-28 RX ADMIN — METHYLPREDNISOLONE SODIUM SUCCINATE 40 MG: 40 INJECTION, POWDER, FOR SOLUTION INTRAMUSCULAR; INTRAVENOUS at 14:20

## 2023-03-28 RX ADMIN — DILTIAZEM HYDROCHLORIDE 60 MG: 30 TABLET, FILM COATED ORAL at 23:30

## 2023-03-28 RX ADMIN — APIXABAN 5 MG: 5 TABLET, FILM COATED ORAL at 20:33

## 2023-03-28 RX ADMIN — SODIUM CHLORIDE SOLN NEBU 3% 3 ML: 3 NEBU SOLN at 19:26

## 2023-03-28 RX ADMIN — DILTIAZEM HYDROCHLORIDE 30 MG: 30 TABLET, FILM COATED ORAL at 05:59

## 2023-03-28 RX ADMIN — DICLOFENAC SODIUM 2 G: 10 GEL TOPICAL at 08:58

## 2023-03-28 RX ADMIN — PAROXETINE HYDROCHLORIDE 20 MG: 20 TABLET, FILM COATED ORAL at 08:50

## 2023-03-28 RX ADMIN — DILTIAZEM HYDROCHLORIDE 30 MG: 30 TABLET, FILM COATED ORAL at 00:05

## 2023-03-28 RX ADMIN — SENNOSIDES AND DOCUSATE SODIUM 1 TABLET: 8.6; 5 TABLET ORAL at 09:03

## 2023-03-28 ASSESSMENT — ACTIVITIES OF DAILY LIVING (ADL)
ADLS_ACUITY_SCORE: 25
ADLS_ACUITY_SCORE: 25
ADLS_ACUITY_SCORE: 27
ADLS_ACUITY_SCORE: 25
ADLS_ACUITY_SCORE: 27
ADLS_ACUITY_SCORE: 25
ADLS_ACUITY_SCORE: 27
ADLS_ACUITY_SCORE: 25
ADLS_ACUITY_SCORE: 27

## 2023-03-28 NOTE — PROGRESS NOTES
A BiPAP of  12/6 @ 21% was applied to the pt via the mask for an increase in WOB and/or SOB.  The bridge of the nose is intact. Pt is tolerating it well. Will continue to monitor and assess the pt's current respiratory status and needs.    Dior Barba, RT

## 2023-03-28 NOTE — PLAN OF CARE
ICU End of Shift Summary.  For vital signs and complete assessments, please see documentation flowsheets.      Pertinent assessments: Aox4, Ax1 to bedside commode, pleasant. BLUE, Exp wheezes, infrequent congested cough. Mucinex X1 given. Normotensive and A-fib throughout the night. Voids using the commode at bedside. No BM this shift.   Major Shift Events: BLUE and becomes very anxious. Uses the BIPAP when moving from bed to commode. Voids adequately but no BM this shift.   Plan (Upcoming Events): Continue to assist with resp mechanical breathing and   Discharge/Transfer Needs: Wean off BIPAP when able     Bedside Shift Report Completed : Y  Bedside Safety Check Completed: Y

## 2023-03-28 NOTE — CONSULTS
Care Management Follow Up    Length of Stay (days): 2    Expected Discharge Date: 03/29/2023     Concerns to be Addressed:  Discharge planning    Patient plan of care discussed at interdisciplinary rounds: Yes    Anticipated Discharge Disposition: Home     Anticipated Discharge Services: None  Anticipated Discharge DME:      Additional Information:  Pt admitted with COPD exacerbation, noted to have unplanned readmission risk of 30%.   Pt is independent at baseline, uses home O2 at hs only. Chart review complete. No discharge needs identified at this time, please update care coordination as needs arise.  Marla Antony RN   Inpatient Care Coordination  [facility]   Phone: [phone]

## 2023-03-28 NOTE — PROGRESS NOTES
[facility]    Hospitalist Progress Note  Name: Lara Melendez    MRN: [id number]  Provider:  Kody Prieto DO, MPH  Date of Service: 03/28/2023    Summary of Stay: Lara Melendez is a 57 year old female Lara Melendez is a 57 year old female with PMH of chronic hypoxic respiratory failure due to COPD with recent exacerbations on 2 L/min oxygen at home at night, discharged from this hospital on 2/13/2023 after admitted for respiratory failure due to COPD, HTN, HLD, hypothyroidism, anxiety, pulmonary nodules and psoriasis who presented to ED via EMS with progressively worsening shortness of breath for the last 5 days  and admitted on 3/26/2023 with acute hypoxic respiratory failure secondary to COPD exacerbation and also found to have A-fib with RVR.    Problem List:   1. Acute on chronic hypoxic respiratory failure secondary to COPD exacerbation and pneumonia: Currently doing reasonably well on BiPAP.  Attempt to transition off onto HFNC versus NC.  Baseline is on 2 L nasal cannula nocturnally.  Still bronchospastic on examination.  Continue Solu-Medrol 40 mg IV every 8 hours.  Continue scheduled nebulizers.  Has been afebrile without leukocytosis.  CT shows nonspecific bronchiolitis with bronchial wall thickening and endobronchial mucoid impaction and bibasilar airspace opacities suspicious for pneumonia.  Continue ceftriaxone and doxycycline.  Cultures negative.  2. Paroxysmal atrial fibrillation with rapid reticular rate: She has no history of atrial fibrillation.  Rate is improved after starting diltiazem but still suboptimally controlled.  Will increase diltiazem to 60 mg every 6 hours and convert to long-acting when done with titration.  Avoid beta-blockers with underlying reactive airway disease.  Echocardiogram is essentially unremarkable and no valvular abnormalities.  Starting Eliquis based on pharmacy liaison investigation.  [id number] is 2.  3. Pulmonary nodule: Increased risk for  malignancy given her tobacco history.  Continue outpatient surveillance imaging.  4. Hypertension: Blood pressure reasonably well controlled.  Continue titrating diltiazem and hold prior to admission losartan to make room for AV lanny blocking agents.  5. Tobacco use disorder: Nicotine replacement as needed.  Smoking cessation advised.  6. Anxiety: Continue PTA Paxil and low-dose clonazepam.  7. Steroid-induced hyperglycemia: Blood sugars mildly elevated.  Hemoglobin A1c is 4.9.  Continue NPH 8 units twice daily, 4 units NovoLog 3 times daily with meals, and medium sliding scale insulin.  8. Hypothyroidism: Continue levothyroxine.  9. Hyperlipidemia: Continue atorvastatin.    DVT Prophylaxis: DOAC  Code Status: Full Code  Diet: Advance Diet as Tolerated: Regular Diet Adult    Hines Catheter: Not present  Disposition: Expected discharge in 2-3 days to home. Goals prior to discharge include wean oxygen.   Incidental Findings: Pulmonary nodules.  Family updated today: No.     Interval History   Assumed care from previous hospitalist. The history was fully reviewed.  No chest pain still coughing and shortness of breath. No nausea, vomiting, diarrhea, constipation. No fevers. No other specific complaints identified.     -Data reviewed today: I personally reviewed all new labs and imaging results over the last 24 hours.     Physical Exam   Temp: 97.9  F (36.6  C) Temp src: Axillary BP: (!) 142/92 Pulse: 102   Resp: 20 SpO2: 98 % O2 Device: BiPAP/CPAP Oxygen Delivery: 3 LPM  Vitals:    03/26/23 1317 03/28/23 0700   Weight: 65.3 kg (143 lb 15.4 oz) 66.6 kg (146 lb 13.2 oz)     Vital Signs with Ranges  Temp:  [97.9  F (36.6  C)-98.7  F (37.1  C)] 97.9  F (36.6  C)  Pulse:  [] 102  Resp:  [17-34] 20  BP: ()/() 142/92  FiO2 (%):  [21 %-30 %] 25 %  SpO2:  [89 %-100 %] 98 %  I/O last 3 completed shifts:  In: 332.58 [I.V.:332.58]  Out: -     GENERAL: No apparent distress. Awake, alert, and fully  oriented.  HEENT: Normocephalic, atraumatic. Extraocular movements intact.  CARDIOVASCULAR: Tachy and IRR IRR without murmurs or rubs. No S3.  PULMONARY: Coarse bilaterally.  GASTROINTESTINAL: Soft, non-tender, non-distended. Bowel sounds normoactive.   EXTREMITIES: No cyanosis or clubbing. No edema.  NEUROLOGICAL: CN 2-12 grossly intact, no focal neurological deficits.  DERMATOLOGICAL: No rash, ulcer, bruising, nor jaundice.     Medications       apixaban ANTICOAGULANT  5 mg Oral BID     cefTRIAXone  2 g Intravenous Q24H     clonazePAM  0.5 mg Oral Daily     diclofenac  2 g Topical 4x Daily     diltiazem  60 mg Oral Q6H ELZBIETA     doxycycline  100 mg Intravenous BID     fluticasone-vilanterol  1 puff Inhalation Daily     insulin aspart  1-7 Units Subcutaneous TID AC     insulin aspart  1-5 Units Subcutaneous At Bedtime     insulin aspart  4 Units Subcutaneous TID w/meals     insulin NPH  8 Units Subcutaneous BID AC     ipratropium - albuterol 0.5 mg/2.5 mg/3 mL  3 mL Nebulization Q6H     levothyroxine  112 mcg Oral Daily     methylPREDNISolone  40 mg Intravenous Q8H     nicotine  1 patch Transdermal Daily     nicotine   Transdermal Q8H ELZBIETA     PARoxetine  20 mg Oral Daily     Data     Laboratory:  Recent Labs   Lab 03/26/23  0618   WBC 5.8   HGB 11.3*   HCT 34.3*   *        Recent Labs   Lab 03/28/23  0747 03/28/23  0611 03/28/23  0154 03/26/23  1324 03/26/23  0618   NA  --   --   --   --  141   POTASSIUM  --   --   --   --  4.3   CHLORIDE  --   --   --   --  104   CO2  --   --   --   --  25   ANIONGAP  --   --   --   --  12   * 154* 145*   < > 89   BUN  --   --   --   --  5.7*   CR  --   --   --   --  0.66   GFRESTIMATED  --   --   --   --  >90   EDIN  --   --   --   --  9.0    < > = values in this interval not displayed.     No results for input(s): CULT in the last 168 hours.    Imaging:  Recent Results (from the past 24 hour(s))   Echocardiogram Complete   Result Value    LVEF  >70%     [facility]    [id number]  [id number]  [id number]  [id number]^TIO^TEMITOPE^JULIO     [facility]  Echocardiography Laboratory  [address] [place] [address]     Name: ALLYSON HE  MRN: [id number]  : 1965  Study Date: 2023 10:41 AM  Age: 57 yrs  Gender: Female  Patient Location: [facility]  Reason For Study: Atrial Fibrillation  Ordering Physician: TEMITOPE KINSEY  Performed By: RESHMA Muñiz     BSA: 1.7 m2  Height: 64 in  Weight: 143 lb  BP: 107/67 mmHg  ______________________________________________________________________________  Procedure  Complete Portable Echo Adult. Optison (NDC #6447-1687) given intravenously.  Technically difficult study.Extremely poor acoustic windows.  ______________________________________________________________________________  Interpretation Summary     Technically challenging study due to poor acoustic windows.     Hyperdynamic left ventricular function. The visual ejection fraction is >70%.  Endocardial borders are not optimally visualized, however no clear wall motion  abnormalities are seen.  The right ventricle is normal in structure, function and size.  No significant valvular abnormalities.  Dilation of the inferior vena cava is present with abnormal respiratory  variation in diameter.  The rhythm was atrial fibrillation.  ______________________________________________________________________________  Left Ventricle  The left ventricle is normal in size. Hyperdynamic left ventricular function.  The visual ejection fraction is >70%. Endocardial borders are not optimally  visualized, however no clear wall motion abnormalities are seen.     Right Ventricle  The right ventricle is normal in structure, function and size.     Atria  The left atrium is mildly dilated. Right atrial size is normal.     Mitral Valve  The mitral valve is normal in structure and function.     Tricuspid Valve  The tricuspid valve is not well visualized, but is  grossly normal. There is  trace tricuspid regurgitation.     Aortic Valve  The aortic valve is not well visualized. Valve morphology is not well  visualized, however on Doppler interrogation it is probably functioning  normally.     Pulmonic Valve  The pulmonic valve is not well seen, but is grossly normal.     Vessels  The aortic root is not well visualized. Normal size ascending aorta. Dilation  of the inferior vena cava is present with abnormal respiratory variation in  diameter.     Pericardium  There is no pericardial effusion.     Rhythm  The rhythm was atrial fibrillation.  ______________________________________________________________________________  MMode/2D Measurements & Calculations  LA Volume (BP): 63.7 ml  LA Volume Index (BP): 37.5 ml/m2  TAPSE: 1.8 cm     Doppler Measurements & Calculations  MV E max stephan: 140.3 cm/sec  MV max P.2 mmHg  MV mean P.3 mmHg  MV V2 VTI: 26.7 cm  MV dec time: 0.16 sec  PA V2 max: 97.7 cm/sec  PA max PG: 3.8 mmHg  PA acc time: 0.11 sec     E/E' av.7  Lateral E/e': 13.4  Medial E/e': 16.0  RV S Stephan: 13.7 cm/sec     ______________________________________________________________________________  Report approved by: Toshia Jimenez 2023 11:50 AM               Kody Prieto DO, MPH  [facility]  [address]  [place] [id number]  2023

## 2023-03-28 NOTE — PROGRESS NOTES
Pt was on BIPAP most of the day for SOB/chest tightness.  BIPAP settings of 12/6, RR 14, 25%.     Currently off BIPAP and sitting on chair with 3LNC. Sat 96-98%, RR 20's.  Pt reported increased HR with coughing.  Pt reported of able to cough up small thick green secretion. Has a congestive cough.   BS-course with wheezing.   Received Duoneb Q6hr and BREO daily.    Pt stated she does Duoneb q4hr at home.  Duoneb now changed to Q4hr.  Added 3% saline neb qid to help with secretion clearance.  Added aerobika qid to help with secretion clearance.     RT to follow.

## 2023-03-29 LAB
ANION GAP SERPL CALCULATED.3IONS-SCNC: 12 MMOL/L (ref 7–15)
BUN SERPL-MCNC: 14.2 MG/DL (ref 8–23)
CALCIUM SERPL-MCNC: 9.3 MG/DL (ref 8.6–10)
CHLORIDE SERPL-SCNC: 100 MMOL/L (ref 98–107)
CREAT SERPL-MCNC: 0.65 MG/DL (ref 0.51–0.95)
DEPRECATED HCO3 PLAS-SCNC: 26 MMOL/L (ref 22–29)
ERYTHROCYTE [DISTWIDTH] IN BLOOD BY AUTOMATED COUNT: 13.4 % (ref 10–15)
GFR SERPL CREATININE-BSD FRML MDRD: >90 ML/MIN/1.73M2
GLUCOSE BLDC GLUCOMTR-MCNC: 120 MG/DL (ref 70–99)
GLUCOSE BLDC GLUCOMTR-MCNC: 121 MG/DL (ref 70–99)
GLUCOSE BLDC GLUCOMTR-MCNC: 147 MG/DL (ref 70–99)
GLUCOSE BLDC GLUCOMTR-MCNC: 155 MG/DL (ref 70–99)
GLUCOSE BLDC GLUCOMTR-MCNC: 157 MG/DL (ref 70–99)
GLUCOSE BLDC GLUCOMTR-MCNC: 91 MG/DL (ref 70–99)
GLUCOSE SERPL-MCNC: 159 MG/DL (ref 70–99)
HCT VFR BLD AUTO: 34.8 % (ref 35–47)
HGB BLD-MCNC: 11 G/DL (ref 11.7–15.7)
MCH RBC QN AUTO: 34.4 PG (ref 26.5–33)
MCHC RBC AUTO-ENTMCNC: 31.6 G/DL (ref 31.5–36.5)
MCV RBC AUTO: 109 FL (ref 78–100)
PLATELET # BLD AUTO: 214 10E3/UL (ref 150–450)
POTASSIUM SERPL-SCNC: 3.9 MMOL/L (ref 3.4–5.3)
RBC # BLD AUTO: 3.2 10E6/UL (ref 3.8–5.2)
SODIUM SERPL-SCNC: 138 MMOL/L (ref 136–145)
WBC # BLD AUTO: 7.1 10E3/UL (ref 4–11)

## 2023-03-29 PROCEDURE — 200N000001 HC R&B ICU

## 2023-03-29 PROCEDURE — 999N000157 HC STATISTIC RCP TIME EA 10 MIN

## 2023-03-29 PROCEDURE — 85027 COMPLETE CBC AUTOMATED: CPT | Performed by: HOSPITALIST

## 2023-03-29 PROCEDURE — 250N000009 HC RX 250: Performed by: HOSPITALIST

## 2023-03-29 PROCEDURE — 99233 SBSQ HOSP IP/OBS HIGH 50: CPT | Performed by: HOSPITALIST

## 2023-03-29 PROCEDURE — 94660 CPAP INITIATION&MGMT: CPT

## 2023-03-29 PROCEDURE — 94640 AIRWAY INHALATION TREATMENT: CPT

## 2023-03-29 PROCEDURE — 250N000012 HC RX MED GY IP 250 OP 636 PS 637: Performed by: HOSPITALIST

## 2023-03-29 PROCEDURE — 80048 BASIC METABOLIC PNL TOTAL CA: CPT | Performed by: HOSPITALIST

## 2023-03-29 PROCEDURE — 250N000011 HC RX IP 250 OP 636: Performed by: INTERNAL MEDICINE

## 2023-03-29 PROCEDURE — 36415 COLL VENOUS BLD VENIPUNCTURE: CPT | Performed by: HOSPITALIST

## 2023-03-29 PROCEDURE — 250N000013 HC RX MED GY IP 250 OP 250 PS 637: Performed by: INTERNAL MEDICINE

## 2023-03-29 PROCEDURE — 250N000011 HC RX IP 250 OP 636: Performed by: HOSPITALIST

## 2023-03-29 PROCEDURE — 94640 AIRWAY INHALATION TREATMENT: CPT | Mod: 76

## 2023-03-29 PROCEDURE — 250N000013 HC RX MED GY IP 250 OP 250 PS 637: Performed by: HOSPITALIST

## 2023-03-29 RX ORDER — METHYLPREDNISOLONE SODIUM SUCCINATE 40 MG/ML
40 INJECTION, POWDER, LYOPHILIZED, FOR SOLUTION INTRAMUSCULAR; INTRAVENOUS EVERY 12 HOURS
Status: DISCONTINUED | OUTPATIENT
Start: 2023-03-29 | End: 2023-03-30

## 2023-03-29 RX ORDER — DILTIAZEM HYDROCHLORIDE 60 MG/1
120 CAPSULE, EXTENDED RELEASE ORAL 2 TIMES DAILY
Status: COMPLETED | OUTPATIENT
Start: 2023-03-29 | End: 2023-03-30

## 2023-03-29 RX ORDER — GUAIFENESIN 600 MG/1
600 TABLET, EXTENDED RELEASE ORAL 2 TIMES DAILY PRN
Status: DISCONTINUED | OUTPATIENT
Start: 2023-03-29 | End: 2023-03-30

## 2023-03-29 RX ADMIN — IPRATROPIUM BROMIDE AND ALBUTEROL SULFATE 3 ML: .5; 3 SOLUTION RESPIRATORY (INHALATION) at 07:42

## 2023-03-29 RX ADMIN — AZITHROMYCIN MONOHYDRATE 250 MG: 250 TABLET ORAL at 08:54

## 2023-03-29 RX ADMIN — SODIUM CHLORIDE SOLN NEBU 3% 3 ML: 3 NEBU SOLN at 20:27

## 2023-03-29 RX ADMIN — DICLOFENAC SODIUM 2 G: 10 GEL TOPICAL at 08:55

## 2023-03-29 RX ADMIN — IPRATROPIUM BROMIDE AND ALBUTEROL SULFATE 3 ML: .5; 3 SOLUTION RESPIRATORY (INHALATION) at 15:13

## 2023-03-29 RX ADMIN — APIXABAN 5 MG: 5 TABLET, FILM COATED ORAL at 20:57

## 2023-03-29 RX ADMIN — IPRATROPIUM BROMIDE AND ALBUTEROL SULFATE 3 ML: .5; 3 SOLUTION RESPIRATORY (INHALATION) at 20:27

## 2023-03-29 RX ADMIN — ACETAMINOPHEN 650 MG: 325 TABLET, FILM COATED ORAL at 20:57

## 2023-03-29 RX ADMIN — CEFTRIAXONE 2 G: 2 INJECTION, POWDER, FOR SOLUTION INTRAMUSCULAR; INTRAVENOUS at 08:54

## 2023-03-29 RX ADMIN — DILTIAZEM HYDROCHLORIDE 60 MG: 30 TABLET, FILM COATED ORAL at 05:17

## 2023-03-29 RX ADMIN — DICLOFENAC SODIUM 2 G: 10 GEL TOPICAL at 13:28

## 2023-03-29 RX ADMIN — SODIUM CHLORIDE SOLN NEBU 3% 3 ML: 3 NEBU SOLN at 11:44

## 2023-03-29 RX ADMIN — IPRATROPIUM BROMIDE AND ALBUTEROL SULFATE 3 ML: .5; 3 SOLUTION RESPIRATORY (INHALATION) at 11:44

## 2023-03-29 RX ADMIN — ATORVASTATIN CALCIUM 20 MG: 20 TABLET, FILM COATED ORAL at 08:54

## 2023-03-29 RX ADMIN — DILTIAZEM HYDROCHLORIDE 120 MG: 60 CAPSULE, EXTENDED RELEASE ORAL at 20:57

## 2023-03-29 RX ADMIN — INSULIN HUMAN 5 UNITS: 100 INJECTION, SUSPENSION SUBCUTANEOUS at 10:44

## 2023-03-29 RX ADMIN — CLONAZEPAM 0.5 MG: 0.5 TABLET ORAL at 08:54

## 2023-03-29 RX ADMIN — PAROXETINE HYDROCHLORIDE 20 MG: 20 TABLET, FILM COATED ORAL at 08:54

## 2023-03-29 RX ADMIN — SODIUM CHLORIDE SOLN NEBU 3% 3 ML: 3 NEBU SOLN at 07:43

## 2023-03-29 RX ADMIN — METHYLPREDNISOLONE SODIUM SUCCINATE 40 MG: 40 INJECTION, POWDER, FOR SOLUTION INTRAMUSCULAR; INTRAVENOUS at 05:17

## 2023-03-29 RX ADMIN — LEVOTHYROXINE SODIUM 112 MCG: 0.11 TABLET ORAL at 08:54

## 2023-03-29 RX ADMIN — NICOTINE 1 PATCH: 21 PATCH, EXTENDED RELEASE TRANSDERMAL at 08:55

## 2023-03-29 RX ADMIN — APIXABAN 5 MG: 5 TABLET, FILM COATED ORAL at 08:54

## 2023-03-29 RX ADMIN — FLUTICASONE FUROATE AND VILANTEROL TRIFENATATE 1 PUFF: 200; 25 POWDER RESPIRATORY (INHALATION) at 15:16

## 2023-03-29 RX ADMIN — DICLOFENAC SODIUM 2 G: 10 GEL TOPICAL at 17:27

## 2023-03-29 RX ADMIN — GUAIFENESIN 600 MG: 600 TABLET, EXTENDED RELEASE ORAL at 10:45

## 2023-03-29 RX ADMIN — IPRATROPIUM BROMIDE AND ALBUTEROL SULFATE 3 ML: .5; 3 SOLUTION RESPIRATORY (INHALATION) at 04:04

## 2023-03-29 RX ADMIN — SODIUM CHLORIDE SOLN NEBU 3% 3 ML: 3 NEBU SOLN at 15:13

## 2023-03-29 RX ADMIN — DICLOFENAC SODIUM 2 G: 10 GEL TOPICAL at 22:42

## 2023-03-29 RX ADMIN — METHYLPREDNISOLONE SODIUM SUCCINATE 40 MG: 40 INJECTION, POWDER, FOR SOLUTION INTRAMUSCULAR; INTRAVENOUS at 17:32

## 2023-03-29 RX ADMIN — INSULIN HUMAN 5 UNITS: 100 INJECTION, SUSPENSION SUBCUTANEOUS at 17:26

## 2023-03-29 RX ADMIN — DILTIAZEM HYDROCHLORIDE 120 MG: 60 CAPSULE, EXTENDED RELEASE ORAL at 08:54

## 2023-03-29 ASSESSMENT — ACTIVITIES OF DAILY LIVING (ADL)
ADLS_ACUITY_SCORE: 25

## 2023-03-29 NOTE — PLAN OF CARE
ICU End of Shift Summary.  For vital signs and complete assessments, please see documentation flowsheets.     Pertinent assessments: Alert & oriented, denies any pain. Calm & cooperative. Tele- controlled Afib with stable BP, on calcium- channel blockers. LS is diminished at the base, noted increased WOB during activity without desaturation, alternate with BIPAP & NC as per patient request. No BM in shift, audible abdominal sounds. Voided without any difficulty and adequate UO.      Major Shift Events: None  Plan (Upcoming Events):  Wean O2 support as tolerated  Discharge/Transfer Needs: TBD    Bedside Shift Report Completed : Yes  Bedside Safety Check Completed:Yes           Plan of Care Reviewed With: patient

## 2023-03-29 NOTE — PROGRESS NOTES
[facility]    Hospitalist Progress Note  Name: Lara Melendez    MRN: [id number]  Provider:  Kody Prieto DO, MPH  Date of Service: 03/29/2023    Summary of Stay: Lara Melendez is a 57 year old female Lara Melendez is a 57 year old female with PMH of chronic hypoxic respiratory failure due to COPD with recent exacerbations on 2 L/min oxygen at home at night, discharged from this hospital on 2/13/2023 after admitted for respiratory failure due to COPD, HTN, HLD, hypothyroidism, anxiety, pulmonary nodules and psoriasis who presented to ED via EMS with progressively worsening shortness of breath for the last 5 days  and admitted on 3/26/2023 with acute hypoxic respiratory failure secondary to COPD exacerbation and also found to have A-fib with RVR.    Problem List:   1. Acute on chronic hypoxic respiratory failure secondary to COPD exacerbation and pneumonia: Currently doing reasonably well on BiPAP and did come off the device several times yesterday.  Attempt to transition off onto HFNC versus NC as much as possible today.  Baseline is on 2 L nasal cannula nocturnally.  Still bronchospastic on examination but improved.  Continue Solu-Medrol but decrease to 40 mg IV every 12 hours and possible transition to prednisone tomorrow.  Continue scheduled nebulizers.  Has been afebrile without leukocytosis.  CT shows nonspecific bronchiolitis with bronchial wall thickening and endobronchial mucoid impaction and bibasilar airspace opacities suspicious for pneumonia.  Continue ceftriaxone and azithromycin (complains of side effects with mucous from doxycycline).  Cultures negative.  2. Paroxysmal atrial fibrillation with rapid reticular rate: She has no history of atrial fibrillation.  Rate is improved after starting diltiazem and appears controlled.  Will change to diltiazem  mg every 12 hours and convert to long-acting on discharge.  Avoid beta-blockers with underlying reactive airway disease.   Echocardiogram is essentially unremarkable and no valvular abnormalities.  Starting Eliquis based on pharmacy liaison investigation.  [id number] is 2.  3. Pulmonary nodule: Increased risk for malignancy given her tobacco history.  Continue outpatient surveillance imaging.  4. Hypertension: Blood pressure reasonably well controlled.  Continue titrating diltiazem and hold prior to admission losartan to make room for AV lanny blocking agents.  5. Tobacco use disorder: Nicotine replacement as needed.  Smoking cessation advised.  6. Anxiety: Continue PTA Paxil and low-dose clonazepam.  7. Steroid-induced hyperglycemia: Blood sugars reasonably well controlled.  Hemoglobin A1c is 4.9.  Decreasing steroids today.  Continue NPH but decrease to 5 units twice daily and decrease to 3 units NovoLog 3 times daily with meals, and continue medium sliding scale insulin.  8. Hypothyroidism: Continue levothyroxine.  9. Hyperlipidemia: Continue atorvastatin.    DVT Prophylaxis: DOAC  Code Status: Full Code  Diet: Advance Diet as Tolerated: Regular Diet Adult    Hines Catheter: Not present  Disposition: Expected discharge in 2 days to home. Goals prior to discharge include wean oxygen.   Incidental Findings: Pulmonary nodules.  Family updated today: No.     Interval History   No chest pain still coughing and shortness of breath. No nausea, vomiting, diarrhea, constipation. No fevers. No other specific complaints identified.     -Data reviewed today: I personally reviewed all new labs and imaging results over the last 24 hours.     Physical Exam   Temp: 98.4  F (36.9  C) Temp src: Temporal BP: 129/77 Pulse: 72   Resp: 18 SpO2: 98 % O2 Device: BiPAP/CPAP Oxygen Delivery: 3 LPM  Vitals:    03/26/23 1317 03/28/23 0700 03/29/23 0300   Weight: 65.3 kg (143 lb 15.4 oz) 66.6 kg (146 lb 13.2 oz) 68.3 kg (150 lb 9.2 oz)     Vital Signs with Ranges  Temp:  [97.7  F (36.5  C)-98.4  F (36.9  C)] 98.4  F (36.9  C)  Pulse:  [] 72  Resp:   [13-41] 18  BP: ()/() 129/77  FiO2 (%):  [25 %] 25 %  SpO2:  [93 %-100 %] 98 %  I/O last 3 completed shifts:  In: 840 [P.O.:840]  Out: 1500 [Urine:1500]    GENERAL: No apparent distress. Awake, alert, and fully oriented.  HEENT: Normocephalic, atraumatic. Extraocular movements intact.  CARDIOVASCULAR: Tachy and IRR IRR without murmurs or rubs. No S3.  PULMONARY: Coarse bilaterally.  GASTROINTESTINAL: Soft, non-tender, non-distended. Bowel sounds normoactive.   EXTREMITIES: No cyanosis or clubbing. No edema.  NEUROLOGICAL: CN 2-12 grossly intact, no focal neurological deficits.  DERMATOLOGICAL: No rash, ulcer, bruising, nor jaundice.     Medications       apixaban ANTICOAGULANT  5 mg Oral BID     atorvastatin  20 mg Oral Daily     azithromycin  250 mg Oral Daily     cefTRIAXone  2 g Intravenous Q24H     clonazePAM  0.5 mg Oral Daily     diclofenac  2 g Topical 4x Daily     diltiazem ER  120 mg Oral BID     fluticasone-vilanterol  1 puff Inhalation Daily     insulin aspart  3 Units Subcutaneous TID w/meals     insulin aspart  1-7 Units Subcutaneous TID AC     insulin aspart  1-5 Units Subcutaneous At Bedtime     insulin NPH  5 Units Subcutaneous BID AC     ipratropium - albuterol 0.5 mg/2.5 mg/3 mL  3 mL Nebulization Q4H     levothyroxine  112 mcg Oral Daily     methylPREDNISolone  40 mg Intravenous Q12H     nicotine  1 patch Transdermal Daily     nicotine   Transdermal Q8H ELZBIETA     PARoxetine  20 mg Oral Daily     sodium chloride  3 mL Nebulization 4x Daily     Data     Laboratory:  Recent Labs   Lab 03/29/23  0553 03/26/23  0618   WBC 7.1 5.8   HGB 11.0* 11.3*   HCT 34.8* 34.3*   * 106*    231     Recent Labs   Lab 03/29/23  0553 03/29/23  0439 03/29/23  0014 03/26/23  1324 03/26/23  0618     --   --   --  141   POTASSIUM 3.9  --   --   --  4.3   CHLORIDE 100  --   --   --  104   CO2 26  --   --   --  25   ANIONGAP 12  --   --   --  12   * 157* 121*   < > 89   BUN 14.2  --   --    --  5.7*   CR 0.65  --   --   --  0.66   GFRESTIMATED >90  --   --   --  >90   EDIN 9.3  --   --   --  9.0    < > = values in this interval not displayed.     No results for input(s): CULT in the last 168 hours.    Imaging:  No results found for this or any previous visit (from the past 24 hour(s)).      Kody Prieto DO MPH  [facility] Hospitalist  [address] [place] [address]  03/29/2023

## 2023-03-29 NOTE — PLAN OF CARE
Goal Outcome Evaluation:      Plan of Care Reviewed With: patient    Goal Outcome Evaluation:       Plan of Care Reviewed With: patient, spouse     ICU End of Shift Summary.  For vital signs and complete assessments, please see documentation flowsheets.      Pertinent assessments: A&Ox4. Denies pain. Afebrile. Tele shows Afib; controlled. BPs stable. On/off BiPAP and 3L NC. BLUE however improving.  Expiratory wheezing throughout; nebs and inhaler ordered. Regular diet and tolerating. Voiding without any complications. Abdomen with slight distention. BS+. Stand-by assist x1 to commode.     Major Shift Events: -Oral diltiazem increased                                    -Ambulated to commode and chair without BiPAP; and tolerated well. Improvement        Plan (Upcoming Events): Wean off BiPAP as able. Monitor HR and effectiveness of meds.   Discharge/Transfer Needs: TBD     Bedside Shift Report Completed : yes  Bedside Safety Check Completed: yes

## 2023-03-29 NOTE — PLAN OF CARE
Patient tolerated 3L NC intermittently throughout the day. Patient's work of breathing and oxygen sats remained normal while on NC but patient felt more comfortable wearing the BiPAP. Nebs increased in freq and patient stated she was feeling an improvement in her breathing and coughing.    Patient sat up in the chair and was encouraged to keep increasing activity.     Patient's blood sugars well controlled. Patient slowly increasing oral intake. Discussed with pharmacy 4units of aspart insulin for meals- since patient eating poorly it was held today and plan to reassess oral intake 3/29.     Patient complained IV sites were uncomfortable. Dr. Kody Prieto ok'd only having 1 site. New IV site placed.       ?

## 2023-03-30 LAB
GLUCOSE BLDC GLUCOMTR-MCNC: 120 MG/DL (ref 70–99)
GLUCOSE BLDC GLUCOMTR-MCNC: 147 MG/DL (ref 70–99)
GLUCOSE BLDC GLUCOMTR-MCNC: 147 MG/DL (ref 70–99)
GLUCOSE BLDC GLUCOMTR-MCNC: 179 MG/DL (ref 70–99)
GLUCOSE BLDC GLUCOMTR-MCNC: 213 MG/DL (ref 70–99)

## 2023-03-30 PROCEDURE — 250N000013 HC RX MED GY IP 250 OP 250 PS 637: Performed by: INTERNAL MEDICINE

## 2023-03-30 PROCEDURE — 250N000011 HC RX IP 250 OP 636: Performed by: INTERNAL MEDICINE

## 2023-03-30 PROCEDURE — 200N000001 HC R&B ICU

## 2023-03-30 PROCEDURE — 999N000157 HC STATISTIC RCP TIME EA 10 MIN

## 2023-03-30 PROCEDURE — 250N000011 HC RX IP 250 OP 636: Performed by: HOSPITALIST

## 2023-03-30 PROCEDURE — 99232 SBSQ HOSP IP/OBS MODERATE 35: CPT | Performed by: HOSPITALIST

## 2023-03-30 PROCEDURE — 250N000012 HC RX MED GY IP 250 OP 636 PS 637: Performed by: HOSPITALIST

## 2023-03-30 PROCEDURE — 250N000013 HC RX MED GY IP 250 OP 250 PS 637: Performed by: HOSPITALIST

## 2023-03-30 PROCEDURE — 94640 AIRWAY INHALATION TREATMENT: CPT

## 2023-03-30 PROCEDURE — 94660 CPAP INITIATION&MGMT: CPT

## 2023-03-30 PROCEDURE — 250N000009 HC RX 250: Performed by: HOSPITALIST

## 2023-03-30 PROCEDURE — 94640 AIRWAY INHALATION TREATMENT: CPT | Mod: 76

## 2023-03-30 RX ORDER — LOSARTAN POTASSIUM 25 MG/1
25 TABLET ORAL DAILY
Status: DISCONTINUED | OUTPATIENT
Start: 2023-03-30 | End: 2023-04-01 | Stop reason: HOSPADM

## 2023-03-30 RX ORDER — DILTIAZEM HYDROCHLORIDE 240 MG/1
240 CAPSULE, COATED, EXTENDED RELEASE ORAL DAILY
Status: DISCONTINUED | OUTPATIENT
Start: 2023-03-31 | End: 2023-04-01 | Stop reason: HOSPADM

## 2023-03-30 RX ORDER — PREDNISONE 20 MG/1
60 TABLET ORAL DAILY
Status: DISCONTINUED | OUTPATIENT
Start: 2023-03-30 | End: 2023-04-01 | Stop reason: HOSPADM

## 2023-03-30 RX ORDER — GUAIFENESIN 600 MG/1
1200 TABLET, EXTENDED RELEASE ORAL 2 TIMES DAILY
Status: DISCONTINUED | OUTPATIENT
Start: 2023-03-30 | End: 2023-04-01 | Stop reason: HOSPADM

## 2023-03-30 RX ADMIN — DICLOFENAC SODIUM 2 G: 10 GEL TOPICAL at 13:10

## 2023-03-30 RX ADMIN — PREDNISONE 60 MG: 50 TABLET ORAL at 08:25

## 2023-03-30 RX ADMIN — SODIUM CHLORIDE SOLN NEBU 3% 3 ML: 3 NEBU SOLN at 20:25

## 2023-03-30 RX ADMIN — PAROXETINE HYDROCHLORIDE 20 MG: 20 TABLET, FILM COATED ORAL at 08:25

## 2023-03-30 RX ADMIN — IPRATROPIUM BROMIDE AND ALBUTEROL SULFATE 3 ML: .5; 3 SOLUTION RESPIRATORY (INHALATION) at 11:42

## 2023-03-30 RX ADMIN — AZITHROMYCIN MONOHYDRATE 250 MG: 250 TABLET ORAL at 08:25

## 2023-03-30 RX ADMIN — DILTIAZEM HYDROCHLORIDE 120 MG: 60 CAPSULE, EXTENDED RELEASE ORAL at 20:36

## 2023-03-30 RX ADMIN — APIXABAN 5 MG: 5 TABLET, FILM COATED ORAL at 08:25

## 2023-03-30 RX ADMIN — ATORVASTATIN CALCIUM 20 MG: 20 TABLET, FILM COATED ORAL at 08:25

## 2023-03-30 RX ADMIN — APIXABAN 5 MG: 5 TABLET, FILM COATED ORAL at 20:37

## 2023-03-30 RX ADMIN — IPRATROPIUM BROMIDE AND ALBUTEROL SULFATE 3 ML: .5; 3 SOLUTION RESPIRATORY (INHALATION) at 07:47

## 2023-03-30 RX ADMIN — DICLOFENAC SODIUM 2 G: 10 GEL TOPICAL at 08:32

## 2023-03-30 RX ADMIN — SODIUM CHLORIDE SOLN NEBU 3% 3 ML: 3 NEBU SOLN at 11:42

## 2023-03-30 RX ADMIN — CLONAZEPAM 0.5 MG: 0.5 TABLET ORAL at 08:25

## 2023-03-30 RX ADMIN — SENNOSIDES AND DOCUSATE SODIUM 2 TABLET: 8.6; 5 TABLET ORAL at 08:46

## 2023-03-30 RX ADMIN — ACETAMINOPHEN 650 MG: 325 TABLET, FILM COATED ORAL at 20:36

## 2023-03-30 RX ADMIN — CEFTRIAXONE 2 G: 2 INJECTION, POWDER, FOR SOLUTION INTRAMUSCULAR; INTRAVENOUS at 08:25

## 2023-03-30 RX ADMIN — DICLOFENAC SODIUM 2 G: 10 GEL TOPICAL at 18:24

## 2023-03-30 RX ADMIN — DILTIAZEM HYDROCHLORIDE 120 MG: 60 CAPSULE, EXTENDED RELEASE ORAL at 08:24

## 2023-03-30 RX ADMIN — IPRATROPIUM BROMIDE AND ALBUTEROL SULFATE 3 ML: .5; 3 SOLUTION RESPIRATORY (INHALATION) at 04:54

## 2023-03-30 RX ADMIN — IPRATROPIUM BROMIDE AND ALBUTEROL SULFATE 3 ML: .5; 3 SOLUTION RESPIRATORY (INHALATION) at 20:24

## 2023-03-30 RX ADMIN — LEVOTHYROXINE SODIUM 112 MCG: 0.11 TABLET ORAL at 08:25

## 2023-03-30 RX ADMIN — DICLOFENAC SODIUM 2 G: 10 GEL TOPICAL at 22:58

## 2023-03-30 RX ADMIN — METHYLPREDNISOLONE SODIUM SUCCINATE 40 MG: 40 INJECTION, POWDER, FOR SOLUTION INTRAMUSCULAR; INTRAVENOUS at 05:21

## 2023-03-30 RX ADMIN — LOSARTAN POTASSIUM 25 MG: 25 TABLET, FILM COATED ORAL at 09:33

## 2023-03-30 RX ADMIN — IPRATROPIUM BROMIDE AND ALBUTEROL SULFATE 3 ML: .5; 3 SOLUTION RESPIRATORY (INHALATION) at 00:20

## 2023-03-30 RX ADMIN — SENNOSIDES AND DOCUSATE SODIUM 1 TABLET: 8.6; 5 TABLET ORAL at 22:24

## 2023-03-30 RX ADMIN — FLUTICASONE FUROATE AND VILANTEROL TRIFENATATE 1 PUFF: 200; 25 POWDER RESPIRATORY (INHALATION) at 07:47

## 2023-03-30 RX ADMIN — NICOTINE 1 PATCH: 21 PATCH, EXTENDED RELEASE TRANSDERMAL at 08:25

## 2023-03-30 RX ADMIN — IPRATROPIUM BROMIDE AND ALBUTEROL SULFATE 3 ML: .5; 3 SOLUTION RESPIRATORY (INHALATION) at 15:06

## 2023-03-30 RX ADMIN — GUAIFENESIN 1200 MG: 600 TABLET, EXTENDED RELEASE ORAL at 20:36

## 2023-03-30 RX ADMIN — GUAIFENESIN 1200 MG: 600 TABLET, EXTENDED RELEASE ORAL at 09:33

## 2023-03-30 RX ADMIN — SODIUM CHLORIDE SOLN NEBU 3% 3 ML: 3 NEBU SOLN at 07:47

## 2023-03-30 RX ADMIN — SODIUM CHLORIDE SOLN NEBU 3% 3 ML: 3 NEBU SOLN at 15:06

## 2023-03-30 ASSESSMENT — ACTIVITIES OF DAILY LIVING (ADL)
ADLS_ACUITY_SCORE: 25

## 2023-03-30 NOTE — PROGRESS NOTES
[facility]    Hospitalist Progress Note  Name: Lara Melendez    MRN: [id number]  Provider:  Kody Prieto DO, MPH  Date of Service: 03/30/2023    Summary of Stay: Lara Melendez is a 57 year old female Lara Melendez is a 57 year old female with PMH of chronic hypoxic respiratory failure due to COPD with recent exacerbations on 2 L/min oxygen at home at night, discharged from this hospital on 2/13/2023 after admitted for respiratory failure due to COPD, HTN, HLD, hypothyroidism, anxiety, pulmonary nodules and psoriasis who presented to ED via EMS with progressively worsening shortness of breath for the last 5 days  and admitted on 3/26/2023 with acute hypoxic respiratory failure secondary to COPD exacerbation and also found to have A-fib with RVR.    Problem List:   1. Acute on chronic hypoxic respiratory failure secondary to COPD exacerbation and pneumonia: Currently doing reasonably well on BiPAP and did come off the device for most of yesterday.  Attempt to remain on NC as much as possible today.  Baseline is on 2 L nasal cannula nocturnally.  Still bronchospastic on examination but improved.  Discontinue Solu-Medrol and transition to prednisone 60 mg daily today with anticipated 10-12 day outpatient taper.  Continue scheduled nebulizers.  Has been afebrile without leukocytosis.  CT shows nonspecific bronchiolitis with bronchial wall thickening and endobronchial mucoid impaction and bibasilar airspace opacities suspicious for pneumonia.  Continue ceftriaxone and azithromycin (complains of side effects with mucous from doxycycline which I listed as a drug intolerance in EPIC).  Cultures negative.  2. Paroxysmal atrial fibrillation with rapid reticular rate: She has no history of atrial fibrillation.  Rate is improved after starting diltiazem and appears controlled.  Will change to diltiazem  mg daily today.  Avoid beta-blockers with underlying reactive airway disease.  Echocardiogram  is essentially unremarkable and no valvular abnormalities.  Started Eliquis based on pharmacy liaison investigation.  [id number] is at least 2.  3. Pulmonary nodule: Increased risk for malignancy given her tobacco history.  Continue outpatient surveillance imaging.  4. Hypertension: Blood pressure reasonably well controlled.  Continue newly added diltiazem and resume PTA losartan at 25 mg daily.  5. Tobacco use disorder: Nicotine replacement as needed.  Smoking cessation advised.  6. Anxiety: Continue PTA Paxil and low-dose clonazepam.  7. Steroid-induced hyperglycemia: Blood sugars reasonably well controlled.  Hemoglobin A1c is 4.9.  Decreasing steroids again today.  Continue NPH but decrease to 5 units once daily with prednisone and decrease to 2 units NovoLog 3 times daily with meals, and continue medium sliding scale insulin.  Will likely not need insulin on discharge.  8. Hypothyroidism: Continue levothyroxine.  9. Hyperlipidemia: Continue atorvastatin.    DVT Prophylaxis: DOAC  Code Status: Full Code  Diet: Advance Diet as Tolerated: Regular Diet Adult    Hines Catheter: Not present  Disposition: Expected discharge in 2 days to home. Goals prior to discharge include wean oxygen. Can transfer to [facility].  Incidental Findings: Pulmonary nodules.  Family updated today: No.     Interval History   No chest pain still coughing and shortness of breath. Overall improved. Was on NC most of the day. No nausea, vomiting, diarrhea, constipation. No fevers. No other specific complaints identified.     -Data reviewed today: I personally reviewed all new labs and imaging results over the last 24 hours.     Physical Exam   Temp: 98.7  F (37.1  C) Temp src: Oral BP: (!) 142/95 Pulse: 84   Resp: 28 SpO2: 98 % O2 Device: Nasal cannula Oxygen Delivery: 3 LPM  Vitals:    03/28/23 0700 03/29/23 0300 03/30/23 0515   Weight: 66.6 kg (146 lb 13.2 oz) 68.3 kg (150 lb 9.2 oz) 68.8 kg (151 lb 10.8 oz)     Vital Signs with Ranges  Temp:   [97.8  F (36.6  C)-98.7  F (37.1  C)] 98.7  F (37.1  C)  Pulse:  [] 84  Resp:  [15-32] 28  BP: (129-162)/() 142/95  FiO2 (%):  [21 %-25 %] 25 %  SpO2:  [93 %-100 %] 98 %  I/O last 3 completed shifts:  In: 1200 [P.O.:1200]  Out: 2050 [Urine:2050]    GENERAL: No apparent distress. Awake, alert, and fully oriented.  HEENT: Normocephalic, atraumatic. Extraocular movements intact.  CARDIOVASCULAR: Tachy and IRR IRR without murmurs or rubs. No S3.  PULMONARY: Coarse bilaterally.  GASTROINTESTINAL: Soft, non-tender, non-distended. Bowel sounds normoactive.   EXTREMITIES: No cyanosis or clubbing. No edema.  NEUROLOGICAL: CN 2-12 grossly intact, no focal neurological deficits.  DERMATOLOGICAL: No rash, ulcer, bruising, nor jaundice.     Medications       apixaban ANTICOAGULANT  5 mg Oral BID     atorvastatin  20 mg Oral Daily     azithromycin  250 mg Oral Daily     cefTRIAXone  2 g Intravenous Q24H     clonazePAM  0.5 mg Oral Daily     diclofenac  2 g Topical 4x Daily     diltiazem ER  120 mg Oral BID     fluticasone-vilanterol  1 puff Inhalation Daily     insulin aspart  2 Units Subcutaneous TID w/meals     insulin aspart  1-7 Units Subcutaneous TID AC     insulin aspart  1-5 Units Subcutaneous At Bedtime     insulin NPH  5 Units Subcutaneous QAM AC     ipratropium - albuterol 0.5 mg/2.5 mg/3 mL  3 mL Nebulization Q4H     levothyroxine  112 mcg Oral Daily     nicotine  1 patch Transdermal Daily     nicotine   Transdermal Q8H [facility]     PARoxetine  20 mg Oral Daily     predniSONE  60 mg Oral Daily     sodium chloride  3 mL Nebulization 4x Daily     Data     Laboratory:  Recent Labs   Lab 03/29/23  0553 03/26/23  0618   WBC 7.1 5.8   HGB 11.0* 11.3*   HCT 34.8* 34.3*   * 106*    231     Recent Labs   Lab 03/30/23  0748 03/30/23  0131 03/29/23  2204 03/29/23  0905 03/29/23  0553 03/26/23  1324 03/26/23  0618   NA  --   --   --   --  138  --  141   POTASSIUM  --   --   --   --  3.9  --  4.3   CHLORIDE  --    --   --   --  100  --  104   CO2  --   --   --   --  26  --  25   ANIONGAP  --   --   --   --  12  --  12   * 147* 120*   < > 159*   < > 89   BUN  --   --   --   --  14.2  --  5.7*   CR  --   --   --   --  0.65  --  0.66   GFRESTIMATED  --   --   --   --  >90  --  >90   EDIN  --   --   --   --  9.3  --  9.0    < > = values in this interval not displayed.     No results for input(s): CULT in the last 168 hours.    Imaging:  No results found for this or any previous visit (from the past 24 hour(s)).      Kody Prieto DO MPH  [facility] Hospitalist  [address] [place] [address]  03/30/2023

## 2023-03-30 NOTE — PLAN OF CARE
Goal Outcome Evaluation:      Plan of Care Reviewed With: patient      Neuro: Alert and oriented. Anxious as times.   Cardiac: HTN this am; improved this afternoon.   Tele: Currently SR, but flips in and out of Afib (new diagnosis this admission).  Respiratory: LS very diminished, exp wheezes. Dyspnea with activity. Shallow breaths.   02: 3 lpm/NC. No BiPAP use this shift.   GI: Tolerating diet; denies nausea. Stool softeners given. LBM=3/25  Diet: Regular  : Voiding without difficulty.   Skin: Intact  Musculoskeletal/CMS: Generalized weakness, up with SBA x1  Pain: Denies pain  Mobility: SBA x1; slowly increasing activity due to dyspnea  Plan of Care: Wean 02 as able; slowly increase activity tolerance as able. Monitor respiratory status. Transfer to medical floor when bed available.

## 2023-03-30 NOTE — PROGRESS NOTES
Respiratory Therapy Note        Pt has remained off BIPAP and on 3 Lpm NC.  Breath sounds have a very tight expiratory wheeze with increased air movement post bronchodilator.      March 30, 2023 4:46 PM  Ron Hernandez, RT

## 2023-03-30 NOTE — PROGRESS NOTES
A BiPAP of  12/6 @ 25% was applied to the pt via the mask for an increase in WOB and/or SOB.  The bridge of the nose is clean and dry. Pt is tolerating it well. Will continue to monitor and assess the pt's current respiratory status and needs.    Venous Blood Gas  Recent Labs   Lab 03/26/23  1436 03/26/23  0623   PHV 7.45*  --    PCO2V 39*  --    PO2V 68*  --    HCO3V 27 27   SUSANNAH 2.5*  --    O2PER 30  --      EXAM: CHEST SINGLE VIEW PORTABLE  LOCATION: [facility]  DATE/TIME: 03/26/2023, 6:32 AM     INDICATION: Shortness of breath.  COMPARISON: 02/11/2023.     FINDINGS: A few chronic-appearing interstitial opacities again noted within both lungs and likely relate to scarring. The lungs are otherwise clear. Normal-sized cardiac silhouette. Atherosclerotic calcification in the thoracic aorta.                                                                      IMPRESSION: No convincing evidence of active cardiopulmonary disease.     Kody Medina, RT on 3/30/2023 at 5:22 AM

## 2023-03-30 NOTE — PLAN OF CARE
ICU End of Shift Summary.  For vital signs and complete assessments, please see documentation flowsheets.     Pertinent assessments: Alert & oriented, afebrile. Tele- Afib, BP stable, no chest pain. LS + wheezes, SOB noted during usual activities. Alternate BIPAP & NC as per request, SPO2 maintained WDL, BS is audible, no BM in shift. Voided without difficulty with good UO. Mobilized to bedside commode.     Major Shift Events: None  Plan (Upcoming Events): To wean O2 support as tolerated, Continue Current care  Discharge/Transfer Needs: TBD    Bedside Shift Report Completed : Yes   Bedside Safety Check Completed: Yes           Plan of Care Reviewed With: patient

## 2023-03-31 LAB
BACTERIA BLD CULT: NO GROWTH
BACTERIA BLD CULT: NO GROWTH
GLUCOSE BLDC GLUCOMTR-MCNC: 101 MG/DL (ref 70–99)
GLUCOSE BLDC GLUCOMTR-MCNC: 102 MG/DL (ref 70–99)
GLUCOSE BLDC GLUCOMTR-MCNC: 102 MG/DL (ref 70–99)
GLUCOSE BLDC GLUCOMTR-MCNC: 239 MG/DL (ref 70–99)

## 2023-03-31 PROCEDURE — 250N000011 HC RX IP 250 OP 636: Performed by: INTERNAL MEDICINE

## 2023-03-31 PROCEDURE — 250N000013 HC RX MED GY IP 250 OP 250 PS 637: Performed by: INTERNAL MEDICINE

## 2023-03-31 PROCEDURE — 250N000012 HC RX MED GY IP 250 OP 636 PS 637: Performed by: HOSPITALIST

## 2023-03-31 PROCEDURE — 250N000009 HC RX 250: Performed by: STUDENT IN AN ORGANIZED HEALTH CARE EDUCATION/TRAINING PROGRAM

## 2023-03-31 PROCEDURE — 94660 CPAP INITIATION&MGMT: CPT

## 2023-03-31 PROCEDURE — 120N000001 HC R&B MED SURG/OB

## 2023-03-31 PROCEDURE — 999N000156 HC STATISTIC RCP CONSULT EA 30 MIN

## 2023-03-31 PROCEDURE — 94799 UNLISTED PULMONARY SVC/PX: CPT

## 2023-03-31 PROCEDURE — 250N000013 HC RX MED GY IP 250 OP 250 PS 637: Performed by: HOSPITALIST

## 2023-03-31 PROCEDURE — 999N000157 HC STATISTIC RCP TIME EA 10 MIN

## 2023-03-31 PROCEDURE — 94640 AIRWAY INHALATION TREATMENT: CPT | Mod: 76

## 2023-03-31 PROCEDURE — 250N000009 HC RX 250: Performed by: HOSPITALIST

## 2023-03-31 PROCEDURE — 94640 AIRWAY INHALATION TREATMENT: CPT

## 2023-03-31 PROCEDURE — 99233 SBSQ HOSP IP/OBS HIGH 50: CPT | Performed by: STUDENT IN AN ORGANIZED HEALTH CARE EDUCATION/TRAINING PROGRAM

## 2023-03-31 RX ORDER — IPRATROPIUM BROMIDE AND ALBUTEROL SULFATE 2.5; .5 MG/3ML; MG/3ML
3 SOLUTION RESPIRATORY (INHALATION)
Status: DISCONTINUED | OUTPATIENT
Start: 2023-03-31 | End: 2023-04-01 | Stop reason: HOSPADM

## 2023-03-31 RX ORDER — MULTIPLE VITAMINS W/ MINERALS TAB 9MG-400MCG
1 TAB ORAL DAILY
Status: DISCONTINUED | OUTPATIENT
Start: 2023-04-01 | End: 2023-04-01 | Stop reason: HOSPADM

## 2023-03-31 RX ADMIN — ACETAMINOPHEN 650 MG: 325 TABLET, FILM COATED ORAL at 09:09

## 2023-03-31 RX ADMIN — FLUTICASONE FUROATE AND VILANTEROL TRIFENATATE 1 PUFF: 200; 25 POWDER RESPIRATORY (INHALATION) at 08:20

## 2023-03-31 RX ADMIN — IPRATROPIUM BROMIDE AND ALBUTEROL SULFATE 3 ML: .5; 3 SOLUTION RESPIRATORY (INHALATION) at 08:20

## 2023-03-31 RX ADMIN — DICLOFENAC SODIUM 2 G: 10 GEL TOPICAL at 09:03

## 2023-03-31 RX ADMIN — DICLOFENAC SODIUM 2 G: 10 GEL TOPICAL at 13:20

## 2023-03-31 RX ADMIN — SODIUM CHLORIDE SOLN NEBU 3% 3 ML: 3 NEBU SOLN at 21:01

## 2023-03-31 RX ADMIN — LOSARTAN POTASSIUM 25 MG: 25 TABLET, FILM COATED ORAL at 09:02

## 2023-03-31 RX ADMIN — PREDNISONE 60 MG: 50 TABLET ORAL at 09:02

## 2023-03-31 RX ADMIN — ACETAMINOPHEN 650 MG: 325 TABLET, FILM COATED ORAL at 20:47

## 2023-03-31 RX ADMIN — IPRATROPIUM BROMIDE AND ALBUTEROL SULFATE 3 ML: .5; 3 SOLUTION RESPIRATORY (INHALATION) at 11:42

## 2023-03-31 RX ADMIN — IPRATROPIUM BROMIDE AND ALBUTEROL SULFATE 3 ML: .5; 3 SOLUTION RESPIRATORY (INHALATION) at 15:53

## 2023-03-31 RX ADMIN — SODIUM CHLORIDE SOLN NEBU 3% 3 ML: 3 NEBU SOLN at 15:55

## 2023-03-31 RX ADMIN — DILTIAZEM HYDROCHLORIDE 240 MG: 240 CAPSULE, COATED, EXTENDED RELEASE ORAL at 09:02

## 2023-03-31 RX ADMIN — IPRATROPIUM BROMIDE AND ALBUTEROL SULFATE 3 ML: .5; 3 SOLUTION RESPIRATORY (INHALATION) at 04:18

## 2023-03-31 RX ADMIN — GUAIFENESIN 1200 MG: 600 TABLET, EXTENDED RELEASE ORAL at 20:47

## 2023-03-31 RX ADMIN — SODIUM CHLORIDE SOLN NEBU 3% 3 ML: 3 NEBU SOLN at 11:44

## 2023-03-31 RX ADMIN — PAROXETINE HYDROCHLORIDE 20 MG: 20 TABLET, FILM COATED ORAL at 09:02

## 2023-03-31 RX ADMIN — IPRATROPIUM BROMIDE AND ALBUTEROL SULFATE 3 ML: .5; 3 SOLUTION RESPIRATORY (INHALATION) at 21:03

## 2023-03-31 RX ADMIN — SODIUM CHLORIDE SOLN NEBU 3% 3 ML: 3 NEBU SOLN at 08:21

## 2023-03-31 RX ADMIN — APIXABAN 5 MG: 5 TABLET, FILM COATED ORAL at 09:02

## 2023-03-31 RX ADMIN — ATORVASTATIN CALCIUM 20 MG: 20 TABLET, FILM COATED ORAL at 09:02

## 2023-03-31 RX ADMIN — CLONAZEPAM 0.5 MG: 0.5 TABLET ORAL at 09:02

## 2023-03-31 RX ADMIN — IPRATROPIUM BROMIDE AND ALBUTEROL SULFATE 3 ML: .5; 3 SOLUTION RESPIRATORY (INHALATION) at 00:23

## 2023-03-31 RX ADMIN — AZITHROMYCIN MONOHYDRATE 250 MG: 250 TABLET ORAL at 09:03

## 2023-03-31 RX ADMIN — LEVOTHYROXINE SODIUM 112 MCG: 0.11 TABLET ORAL at 09:02

## 2023-03-31 RX ADMIN — CEFTRIAXONE 2 G: 2 INJECTION, POWDER, FOR SOLUTION INTRAMUSCULAR; INTRAVENOUS at 09:01

## 2023-03-31 RX ADMIN — APIXABAN 5 MG: 5 TABLET, FILM COATED ORAL at 20:48

## 2023-03-31 RX ADMIN — GUAIFENESIN 1200 MG: 600 TABLET, EXTENDED RELEASE ORAL at 09:02

## 2023-03-31 RX ADMIN — NICOTINE 1 PATCH: 21 PATCH, EXTENDED RELEASE TRANSDERMAL at 09:13

## 2023-03-31 RX ADMIN — DICLOFENAC SODIUM 2 G: 10 GEL TOPICAL at 20:50

## 2023-03-31 ASSESSMENT — ACTIVITIES OF DAILY LIVING (ADL)
ADLS_ACUITY_SCORE: 22
ADLS_ACUITY_SCORE: 22
ADLS_ACUITY_SCORE: 25
ADLS_ACUITY_SCORE: 22
ADLS_ACUITY_SCORE: 22
ADLS_ACUITY_SCORE: 20
ADLS_ACUITY_SCORE: 22
ADLS_ACUITY_SCORE: 20
ADLS_ACUITY_SCORE: 20
ADLS_ACUITY_SCORE: 25
ADLS_ACUITY_SCORE: 20
ADLS_ACUITY_SCORE: 25

## 2023-03-31 NOTE — PLAN OF CARE
ICU End of Shift Summary.  For vital signs and complete assessments, please see documentation flowsheets.     Pertinent assessments: Awake & oriented, complains of headache settled by PRN med. Afebile. Tele- Afib with stable BP. LS is diminished, on BIPAP at night as per request. SPO2 WDL, Constipated- stool softener given, BS+. Voided freely by ambulating to the bathroom, adequate UO.    Major Shift Events: None  Discharge/Transfer Needs: TBD        Plan of Care Reviewed With: patient

## 2023-03-31 NOTE — CONSULTS
"CLINICAL NUTRITION SERVICES  -  ASSESSMENT NOTE      Recommendations Ordered by Registered Dietitian (RD):   MVI/M   Future/Additional Recommendations:   Stooling trends   Malnutrition:   % Weight Loss:  None noted  % Intake:  No decreased intake noted  Subcutaneous Fat Loss:  None observed  Muscle Loss:  Clavicle bone region moderate depletion and Acromion bone region moderate depletion  Fluid Retention:  None noted in extremities     Malnutrition Diagnosis: Patient does not meet two of the above criteria necessary for diagnosing malnutrition     REASON FOR ASSESSMENT  Lara Melendez is a 57 year old female seen by Registered Dietitian for LOS    PMH:  - COPD on (on nocturnal oxygen at 2 L/min at home) who was recently hospitalized from 2/11 to 2/13 for respiratory failure due to COPD exacerbation, presented back on 3/26 with acute hypoxic respiratory failure secondary to COPD exacerbation and was also found to be in new onset atrial fibrillation with RVR.  - admitted w/ acute on chronic hypoxic respiratory failure secondary to COPD exacerbation and pneumonia    NUTRITION HISTORY  - Information obtained from patient  - Diet history: patient lives with , she typically eats 2 meals per day (lunch and dinner). Has noticed recent difficulty with eating d/t SOB, has resolved since hospitalization.  - says she is a \"picky eater\" and typically eats meat and potatoes, does not like to eat much fruit or vegetables  - does not eat peanuts d/t the skin irritating diverticulitis    CURRENT NUTRITION ORDERS  Diet Order:     Orders Placed This Encounter      Advance Diet as Tolerated: Regular Diet Adult    Current Intake/Tolerance:  Ordering 2 meals/day (breakfast and dinner) - states that she is eating at baseline. Drinking adequate fluids.    - last bowel movement Saturday 3/25, on bowel regimen. Patient denies abdominal pain, nausea, or decrease in appetite    NUTRITION FOCUSED PHYSICAL ASSESSMENT FOR DIAGNOSING " "MALNUTRITION)  Yes    Observed:    Muscle wasting (refer to documentation in Malnutrition section)    ANTHROPOMETRICS  Height: 5' 3.5\"  Weight: 150 lbs 4.8 oz  Body mass index is 26.21 kg/m .  Weight Status:  Overweight BMI 25-29.9  Weight History:   #  - recent weight gain noted, possibly related to fluid status  03/31/23 0613 68.2 kg (150 lb 4.8 oz) Standing scale   03/30/23 0515 68.8 kg (151 lb 10.8 oz) Bed scale   03/29/23 0300 68.3 kg (150 lb 9.2 oz) Bed scale   03/28/23 0700 66.6 kg (146 lb 13.2 oz) Bed scale   03/26/23 1317 65.3 kg (143 lb 15.4 oz) Bed scale     Wt Readings from Last 10 Encounters:   03/31/23 68.2 kg (150 lb 4.8 oz)   02/11/23 60 kg (132 lb 4.4 oz)   12/02/22 61.3 kg (135 lb 2.3 oz)   09/28/22 63.1 kg (139 lb 1.6 oz)   08/29/22 61.7 kg (136 lb 1.6 oz)   05/17/22 61.2 kg (134 lb 14.4 oz)   04/02/22 58.9 kg (129 lb 12.8 oz)   02/21/22 59 kg (130 lb)   02/04/22 59 kg (130 lb)   02/04/22 59 kg (130 lb)     LABS  Labs reviewed  Labs:  Electrolytes  Potassium (mmol/L)   Date Value   03/29/2023 3.9   03/26/2023 4.3   02/12/2023 4.1   05/17/2022 4.5   05/16/2022 4.6   05/15/2022 4.1   02/02/2006 3.4   02/01/2006 3.5   01/31/2006 3.9     Phosphorus (mg/dL)   Date Value   01/11/2022 3.4   01/30/2006 3.1    Blood Glucose  Glucose (mg/dL)   Date Value   05/17/2022 124 (H)   05/16/2022 138 (H)   05/15/2022 138 (H)   05/14/2022 120 (H)   05/14/2022 120 (H)   05/14/2022 118 (H)   02/02/2006 90   02/01/2006 90   01/31/2006 95   01/30/2006 106   01/30/2006 144 (H)     GLUCOSE BY METER POCT (mg/dL)   Date Value   03/31/2023 102 (H)   03/30/2023 179 (H)   03/30/2023 120 (H)   03/30/2023 213 (H)   03/30/2023 147 (H)     Hemoglobin A1C (%)   Date Value   03/26/2023 4.9   12/01/2022 4.4    Inflammatory Markers  CRP Inflammation (mg/L)   Date Value   05/17/2022 <2.9   05/16/2022 <2.9   05/15/2022 3.6     WBC (10e9/L)   Date Value   02/02/2006 6.2   02/01/2006 7.7   01/31/2006 8.2     WBC Count (10e3/uL) "   Date Value   03/29/2023 7.1   03/26/2023 5.8   02/12/2023 5.5     Albumin (g/dL)   Date Value   03/26/2023 4.0   10/26/2022 3.9   09/27/2022 3.9   05/15/2022 3.0 (L)   05/14/2022 3.5   05/14/2022 3.5   02/01/2006 2.3 (L)   01/30/2006 2.5 (L)   01/30/2006 2.9 (L)      Magnesium (mg/dL)   Date Value   02/12/2023 2.1   05/17/2022 2.4 (H)   05/16/2022 2.3   01/31/2006 2.0   01/31/2006 1.4 (L)   01/30/2006 2.6 (H)     Magnesium Laxative Screen (no units)   Date Value   01/30/2006 91.0     Sodium (mmol/L)   Date Value   03/29/2023 138   03/26/2023 141   02/12/2023 137   02/02/2006 133   02/01/2006 135   01/31/2006 130 (L)    Renal  Urea Nitrogen (mg/dL)   Date Value   03/29/2023 14.2   03/26/2023 5.7 (L)   02/12/2023 7.7 (L)   05/17/2022 17   05/16/2022 14   05/15/2022 10   02/02/2006 <2 (L)   02/01/2006 3 (L)   01/31/2006 4 (L)     Creatinine (mg/dL)   Date Value   03/29/2023 0.65   03/26/2023 0.66   02/12/2023 0.50 (L)   02/02/2006 0.80   02/01/2006 0.90   01/31/2006 0.90     Additional  Ketones Urine (mg/dL)   Date Value   01/29/2006 Negative        MEDICATIONS  Medications reviewed    apixaban ANTICOAGULANT  5 mg Oral BID     atorvastatin  20 mg Oral Daily     azithromycin  250 mg Oral Daily     cefTRIAXone  2 g Intravenous Q24H     clonazePAM  0.5 mg Oral Daily     diclofenac  2 g Topical 4x Daily     diltiazem ER COATED BEADS  240 mg Oral Daily     fluticasone-vilanterol  1 puff Inhalation Daily     guaiFENesin  1,200 mg Oral BID     insulin aspart  2 Units Subcutaneous TID w/meals     insulin aspart  1-7 Units Subcutaneous TID AC     insulin aspart  1-5 Units Subcutaneous At Bedtime     insulin NPH  5 Units Subcutaneous QAM AC     ipratropium - albuterol 0.5 mg/2.5 mg/3 mL  3 mL Nebulization Q4H     levothyroxine  112 mcg Oral Daily     losartan  25 mg Oral Daily     nicotine  1 patch Transdermal Daily     nicotine   Transdermal Q8H ELZBIETA     PARoxetine  20 mg Oral Daily     predniSONE  60 mg Oral Daily     sodium  chloride  3 mL Nebulization 4x Daily         acetaminophen, cyclobenzaprine, glucose **OR** dextrose **OR** glucagon, ipratropium - albuterol 0.5 mg/2.5 mg/3 mL, ondansetron **OR** ondansetron, polyethylene glycol, prochlorperazine **OR** prochlorperazine **OR** prochlorperazine, senna-docusate **OR** senna-docusate     ASSESSED NUTRITION NEEDS PER APPROVED PRACTICE GUIDELINES:  Dosing Weight 68.2 kg  Estimated Energy Needs: 25-30 Kcal/Kg  Justification: maintenance  Estimated Protein Needs: 1-1.2 g pro/Kg  Justification: maintenance and preservation of lean body mass  Estimated Fluid Needs: 1 mL/Kcal  Justification: maintenance    NUTRITION DIAGNOSIS:  Predicted suboptimal nutrient intake (vitamins/minerals) related to imbalanced diet as evidenced by diet recall.    NUTRITION INTERVENTIONS  Recommendations / Nutrition Prescription  See top of note.    Implementation  Nutrition education: Provided education on small frequent meals during times of SOB or difficulty obtaining adequate nutrition  Multivitamin/Mineral    Nutrition Goals  Continue to eat 2 meals/day    MONITORING AND EVALUATION:  Progress towards goals will be monitored and evaluated per protocol and Practice Guidelines      Janae Philippe RDN, LD  Clinical Dietitian

## 2023-03-31 NOTE — PROGRESS NOTES
@ 8108 report given to surgical tirado RNKristel. @3428 Transferred patient to the medical tirado # [id number], along with the patient personal belonging and personally handed over to the RN in charged.

## 2023-03-31 NOTE — PLAN OF CARE
"Goal Outcome Evaluation:    Vitals: BP (!) 152/94 (BP Location: Right arm, Patient Position: Semi-Dorsey's)   Pulse 74   Temp 98.3  F (36.8  C) (Oral)   Resp 16   Ht 1.613 m (5' 3.5\")   Wt 68.2 kg (150 lb 4.8 oz)   SpO2 95%   BMI 26.21 kg/m        Primary DX: A-Fib RVR / COPD Exacerbation  Orientation: A&OX4. Speech clear. Uses call light appropriately.   Pertinent: Transferred from ICU at 6:10am today  Pain: Denies  Blood Glucose: to be checked prior to breakfast. (ACHS). Per MD note: Steroid induced Hyperglycemia.   Anticoagulant: Eliquis  Neuro: Intact  Respiratory: LS diminished. Infrequent productive cough. BLUE. HOB elevated to facilitate breathing.   Cardiac/Tele:   Bowel Sounds: +. ABD soft, non-tender. Last bm was Saturday per patient. States she does not have any discomfort from it. Has received bowel meds yesterday.   GI/: No bm this am. Continent urine.   Skin: Alvaro. Scattered bruising.   LDA: L PIV SL.   Diet: Regular diet with Carb Count  Activity: SBA. Gait steady.   Followed By/Consults: RT  Plan: Wean 02 as able.                           "

## 2023-03-31 NOTE — PLAN OF CARE
14:40 Had c/o headache earlier in shift.  PRN tylenol helpful. Mobility/ambulates with SBA.  Steady gait.  Lungs: BLUE, diminished. 99% 2L O2. Uses home oxygen 2-3 L at baseline (usually just at night). Tele: SR.  Regular diet.  Voiding without difficulty. PIV saline locked. Continue prednisone, eliquis. Nicotine patch, rocephin, Zithromax, insulin for steroid induced hyperglycemia.  Discharge home when ready.    15:00 Patient has been assessed for Home Oxygen needs. Oxygen readings:    *Pulse oximetry (SpO2) = 91-93% on room air at rest while awake.    *SpO2 improved to % on 2 liters/minute at rest.    *SpO2 = 87-89% on room air during activity/with exercise.    *SpO2 improved to 92-93% on 2-3 liters/minute during activity/with exercise.    15:10 MD updated on Home O2 study

## 2023-03-31 NOTE — PROGRESS NOTES
Patient Transfer Information  Patient connected to monitoring equipment on arrival: yes Vital signs monitor and Continuous pulse oximetry     Patient connected to wall oxygen on arrival: Yes    Belongings: Transferred with patient    Safety check completed: Yes

## 2023-03-31 NOTE — PROGRESS NOTES
"[facility] RCAT     Date: 3/31/23    Admission Dx: COPD exacerbation    Pulmonary History: COPD    Home Nebulizer/MDI Use:    Home Oxygen: 2L at night    Acuity Level (RCAT flow sheet): Level 3    Aerosol Therapy initiated: Duoneb QID, Hypertonic saline nebulizer QID    Pulmonary Hygiene initiated: cough and deep breatihing, Aerobika    Volume Expansion initiated: Incentive spirometry    Current Oxygen Requirements: 2L NC    Current SpO2: 95%    Re-evaluation date: 4/3/23    Patient Education: Indications for nebulizers discussed. Incentive spirometry and aerobika explained and encouraged.      See \"RT Assessments\" flow sheet for patient assessment scoring and Acuity Level Details.             "

## 2023-03-31 NOTE — PROGRESS NOTES
[facility]    Medicine Progress Note - Hospitalist Service    Date of Admission:  3/26/2023    Assessment & Plan   57-year-old female with history of COPD (on nocturnal oxygen at 3 L/min at home) who was recently hospitalized from 2/11 to 2/13 for respiratory failure due to COPD exacerbation, presented back on 3/26 with acute hypoxic respiratory failure secondary to COPD exacerbation and was also found to be in new onset atrial fibrillation with RVR.    CT shows nonspecific bronchiolitis with bronchial wall thickening and endobronchial mucoid impaction and bibasilar airspace opacities suspicious for pneumonia      1. Acute on chronic hypoxic respiratory failure secondary to COPD exacerbation secondary to bibasilar pneumonia    Weaned off BiPAP during the day, but spent last night on.    Continue ceftriaxone and azithromycin.    Steroid, transition from Solu-Medrol to prednisone.    2. New onset paroxysmal atrial fibrillation with RVR    Was rate controlled with diltiazem CD2 140 mg daily. Avoiding beta-blockers due to reactive airway disease.  Now in sinus rhythm, will discharge with cardiac monitor and follow-up with cardiology.    [id number] score of at least 2, started Eliquis    Echocardiogram with normal EF and no valvular abnormalities.    3. Left upper lobe lung nodule    4 mm in size, likely postinfectious or postinflammatory per radiology.    Follow-up CT in 12 months.    4. Essential hypertension    Reasonably controlled with newly added diltiazem and prior to admission losartan 25 mg daily    5. Tobacco use disorder.    Continue nicotine replacement.    6. Anxiety disorder.    Continue prior to admission dose of Paxil and low-dose clonazepam.    7. Steroid-induced hyperglycemia    HbA1c is 4.9, given NPH 5 units daily and NovoLog 2 units before meals while inpatient but will not need insulin on discharge.    8. Hypothyroidism.    Continue Synthroid    9. Hyperlipidemia    Continue  "atorvastatin    Disposition: We will do oximetry and road test today to see if she can ambulate without getting short of breath or hypoxic.     Diet: Advance Diet as Tolerated: Regular Diet Adult    DVT Prophylaxis: DOAC  Hines Catheter: Not present  Lines: None     Cardiac Monitoring: ACTIVE order. Indication: ICU  Code Status: Full Code      Clinically Significant Risk Factors                        # Overweight: Estimated body mass index is 26.21 kg/m  as calculated from the following:    Height as of this encounter: 1.613 m (5' 3.5\").    Weight as of this encounter: 68.2 kg (150 lb 4.8 oz).          Disposition Plan     Expected Discharge Date: 04/01/2023      Destination: home            Mateo Klein MD  Hospitalist Service  [facility]  Securely message with [employer] (more info)  Text page via [employer] Paging/Directory   ______________________________________________________________________    Interval History   Shortness of breath is improved    Physical Exam   Vital Signs: Temp: 98.2  F (36.8  C) Temp src: Oral BP: (!) 150/93 Pulse: 70   Resp: 16 SpO2: 100 % O2 Device: Nasal cannula Oxygen Delivery: 3 LPM  Weight: 150 lbs 4.8 oz    Alert awake oriented x3  Chest is clear to auscultation  Heart S1-S2 is normal  Abdomen is soft and nontender    Medical Decision Making       MANAGEMENT DISCUSSED with the following over the past 24 hours: Patient and RN       Data   Imaging results reviewed over the past 24 hrs:   No results found for this or any previous visit (from the past 24 hour(s)).  Recent Labs   Lab 03/31/23  0856 03/30/23  2208 03/30/23  1606 03/29/23  0905 03/29/23  0553 03/26/23  1324 03/26/23  0618   WBC  --   --   --   --  7.1  --  5.8   HGB  --   --   --   --  11.0*  --  11.3*   MCV  --   --   --   --  109*  --  106*   PLT  --   --   --   --  214  --  231   INR  --   --   --   --   --   --  0.97   NA  --   --   --   --  138  --  141   POTASSIUM  --   --   --   --  3.9  --  4.3 "   CHLORIDE  --   --   --   --  100  --  104   CO2  --   --   --   --  26  --  25   BUN  --   --   --   --  14.2  --  5.7*   CR  --   --   --   --  0.65  --  0.66   ANIONGAP  --   --   --   --  12  --  12   EDIN  --   --   --   --  9.3  --  9.0   * 179* 120*   < > 159*   < > 89   ALBUMIN  --   --   --   --   --   --  4.0   PROTTOTAL  --   --   --   --   --   --  7.1   BILITOTAL  --   --   --   --   --   --  0.2   ALKPHOS  --   --   --   --   --   --  76   ALT  --   --   --   --   --   --  27   AST  --   --   --   --   --   --  23    < > = values in this interval not displayed.

## 2023-03-31 NOTE — PROGRESS NOTES
"SPIRITUAL HEALTH SERVICES Progress Note  [facility] Med Surge 3    Saw pt Lara \"Ewa\" KELLI Melendez per length of stay.      Patient/Family Understanding of Illness and Goals of Care -   o Pt shared she had just come from the ICU fighting pneumonia and COPD exacerbation.  o Ewa now is recovering on third floor and hopes to discharge tomorrow.       Distress and Loss -   o Pt described the last year as \"a roller coaster\" since June of 2021. She had a surgery in June 2021 for an abscess that turned into an infection that took two months to heal from.  o Pt contracted Covid-19 May of 2022 which increased anxiety that didn't resolve until December of the same year.      Strengths, Coping, and Resources -   o Ewa shared that she has ample family who lives close by, including her Mom, age 92, who sends her messages of frederick.  o Ewa named her coworkers as a support to her as well.      Meaning, Beliefs, and Spirituality -   o Pt attends a non-[employer]ae[employer] and describes her frederick as a support to her during the recovery of this illness and past two years of illness.  o Ewa is looking to her frederick to provide better stability for her.       Plan of Care - Informed pt how she can request further  support.    JERMAIN Jacinto    Intern    [facility] routine referrals *[phone]   [facility] available 24/7 for emergent requests/referrals, either by paging the on-call  or by entering an ASAP/STAT consult in Epic (this will also page the on-call ).     "

## 2023-04-01 VITALS
BODY MASS INDEX: 25.88 KG/M2 | DIASTOLIC BLOOD PRESSURE: 61 MMHG | HEIGHT: 64 IN | OXYGEN SATURATION: 97 % | SYSTOLIC BLOOD PRESSURE: 117 MMHG | HEART RATE: 76 BPM | RESPIRATION RATE: 20 BRPM | WEIGHT: 151.6 LBS | TEMPERATURE: 98.5 F

## 2023-04-01 LAB
CREAT SERPL-MCNC: 0.74 MG/DL (ref 0.51–0.95)
GFR SERPL CREATININE-BSD FRML MDRD: >90 ML/MIN/1.73M2
GLUCOSE BLDC GLUCOMTR-MCNC: 101 MG/DL (ref 70–99)
GLUCOSE BLDC GLUCOMTR-MCNC: 106 MG/DL (ref 70–99)
PLATELET # BLD AUTO: 175 10E3/UL (ref 150–450)

## 2023-04-01 PROCEDURE — 94640 AIRWAY INHALATION TREATMENT: CPT

## 2023-04-01 PROCEDURE — 250N000013 HC RX MED GY IP 250 OP 250 PS 637: Performed by: HOSPITALIST

## 2023-04-01 PROCEDURE — 94640 AIRWAY INHALATION TREATMENT: CPT | Mod: 76

## 2023-04-01 PROCEDURE — 250N000013 HC RX MED GY IP 250 OP 250 PS 637: Performed by: INTERNAL MEDICINE

## 2023-04-01 PROCEDURE — 250N000012 HC RX MED GY IP 250 OP 636 PS 637: Performed by: HOSPITALIST

## 2023-04-01 PROCEDURE — 36415 COLL VENOUS BLD VENIPUNCTURE: CPT | Performed by: HOSPITALIST

## 2023-04-01 PROCEDURE — 250N000009 HC RX 250: Performed by: STUDENT IN AN ORGANIZED HEALTH CARE EDUCATION/TRAINING PROGRAM

## 2023-04-01 PROCEDURE — 85049 AUTOMATED PLATELET COUNT: CPT | Performed by: HOSPITALIST

## 2023-04-01 PROCEDURE — 82565 ASSAY OF CREATININE: CPT | Performed by: HOSPITALIST

## 2023-04-01 PROCEDURE — 999N000157 HC STATISTIC RCP TIME EA 10 MIN

## 2023-04-01 PROCEDURE — 250N000009 HC RX 250: Performed by: HOSPITALIST

## 2023-04-01 PROCEDURE — 250N000011 HC RX IP 250 OP 636: Performed by: HOSPITALIST

## 2023-04-01 PROCEDURE — 99239 HOSP IP/OBS DSCHRG MGMT >30: CPT | Performed by: INTERNAL MEDICINE

## 2023-04-01 RX ORDER — PREDNISONE 20 MG/1
TABLET ORAL
Qty: 20 TABLET | Refills: 0 | Status: ON HOLD | OUTPATIENT
Start: 2023-04-01 | End: 2023-07-18

## 2023-04-01 RX ORDER — CEFUROXIME AXETIL 500 MG/1
500 TABLET ORAL 2 TIMES DAILY
Qty: 14 TABLET | Refills: 0 | Status: SHIPPED | OUTPATIENT
Start: 2023-04-01 | End: 2023-04-08

## 2023-04-01 RX ORDER — DILTIAZEM HYDROCHLORIDE 240 MG/1
240 CAPSULE, COATED, EXTENDED RELEASE ORAL DAILY
Qty: 30 CAPSULE | Refills: 0 | Status: ON HOLD | OUTPATIENT
Start: 2023-04-02 | End: 2023-12-28

## 2023-04-01 RX ORDER — CYCLOBENZAPRINE HCL 5 MG
5 TABLET ORAL EVERY 8 HOURS PRN
Qty: 30 TABLET | Refills: 0 | Status: ON HOLD | OUTPATIENT
Start: 2023-04-01 | End: 2023-12-28

## 2023-04-01 RX ORDER — NICOTINE 21 MG/24HR
1 PATCH, TRANSDERMAL 24 HOURS TRANSDERMAL DAILY
Qty: 28 PATCH | Refills: 0 | Status: ON HOLD | OUTPATIENT
Start: 2023-04-02 | End: 2023-12-28

## 2023-04-01 RX ADMIN — LEVOTHYROXINE SODIUM 112 MCG: 0.11 TABLET ORAL at 09:04

## 2023-04-01 RX ADMIN — IPRATROPIUM BROMIDE AND ALBUTEROL SULFATE 3 ML: .5; 3 SOLUTION RESPIRATORY (INHALATION) at 11:49

## 2023-04-01 RX ADMIN — CLONAZEPAM 0.5 MG: 0.5 TABLET ORAL at 09:09

## 2023-04-01 RX ADMIN — SODIUM CHLORIDE SOLN NEBU 3% 3 ML: 3 NEBU SOLN at 08:27

## 2023-04-01 RX ADMIN — SENNOSIDES AND DOCUSATE SODIUM 1 TABLET: 8.6; 5 TABLET ORAL at 09:08

## 2023-04-01 RX ADMIN — GUAIFENESIN 1200 MG: 600 TABLET, EXTENDED RELEASE ORAL at 09:04

## 2023-04-01 RX ADMIN — IPRATROPIUM BROMIDE AND ALBUTEROL SULFATE 3 ML: .5; 3 SOLUTION RESPIRATORY (INHALATION) at 06:30

## 2023-04-01 RX ADMIN — SODIUM CHLORIDE SOLN NEBU 3% 3 ML: 3 NEBU SOLN at 11:49

## 2023-04-01 RX ADMIN — DICLOFENAC SODIUM 2 G: 10 GEL TOPICAL at 09:10

## 2023-04-01 RX ADMIN — POLYETHYLENE GLYCOL 3350 17 G: 17 POWDER, FOR SOLUTION ORAL at 09:09

## 2023-04-01 RX ADMIN — PAROXETINE HYDROCHLORIDE 20 MG: 20 TABLET, FILM COATED ORAL at 09:09

## 2023-04-01 RX ADMIN — DILTIAZEM HYDROCHLORIDE 240 MG: 240 CAPSULE, COATED, EXTENDED RELEASE ORAL at 09:08

## 2023-04-01 RX ADMIN — ATORVASTATIN CALCIUM 20 MG: 20 TABLET, FILM COATED ORAL at 09:09

## 2023-04-01 RX ADMIN — ACETAMINOPHEN 650 MG: 325 TABLET, FILM COATED ORAL at 06:35

## 2023-04-01 RX ADMIN — APIXABAN 5 MG: 5 TABLET, FILM COATED ORAL at 09:08

## 2023-04-01 RX ADMIN — IPRATROPIUM BROMIDE AND ALBUTEROL SULFATE 3 ML: .5; 3 SOLUTION RESPIRATORY (INHALATION) at 08:27

## 2023-04-01 RX ADMIN — PREDNISONE 60 MG: 50 TABLET ORAL at 09:04

## 2023-04-01 RX ADMIN — CEFTRIAXONE 2 G: 2 INJECTION, POWDER, FOR SOLUTION INTRAMUSCULAR; INTRAVENOUS at 09:10

## 2023-04-01 RX ADMIN — NICOTINE 1 PATCH: 21 PATCH, EXTENDED RELEASE TRANSDERMAL at 09:09

## 2023-04-01 RX ADMIN — AZITHROMYCIN MONOHYDRATE 250 MG: 250 TABLET ORAL at 09:09

## 2023-04-01 RX ADMIN — LOSARTAN POTASSIUM 25 MG: 25 TABLET, FILM COATED ORAL at 09:08

## 2023-04-01 RX ADMIN — MULTIPLE VITAMINS W/ MINERALS TAB 1 TABLET: TAB at 09:09

## 2023-04-01 RX ADMIN — CYCLOBENZAPRINE HYDROCHLORIDE 5 MG: 5 TABLET, FILM COATED ORAL at 09:08

## 2023-04-01 RX ADMIN — FLUTICASONE FUROATE AND VILANTEROL TRIFENATATE 1 PUFF: 200; 25 POWDER RESPIRATORY (INHALATION) at 08:30

## 2023-04-01 ASSESSMENT — ACTIVITIES OF DAILY LIVING (ADL)
ADLS_ACUITY_SCORE: 20
ADLS_ACUITY_SCORE: 22
ADLS_ACUITY_SCORE: 20
ADLS_ACUITY_SCORE: 20

## 2023-04-01 NOTE — PLAN OF CARE
Goal Outcome Evaluation:    A/O x4. VSS on 3 L NC. Up with SBA. PRN tylenol given for back pain. Will continue plan of care.

## 2023-04-01 NOTE — PLAN OF CARE
"/74 (BP Location: Right arm)   Pulse 87   Temp 97.5  F (36.4  C) (Oral)   Resp 20   Ht 1.613 m (5' 3.5\")   Wt 68.2 kg (150 lb 4.8 oz)   SpO2 99%   BMI 26.21 kg/m      Aox4. Reports back pain and ice pack given w/ relief per pt. In AM pt requested PRN tylenol and ice pack for back pain. Denies CP and SOB at rest, but BLUE reported. PRN neb given in AM per pt request. Sating well on 3 L via NC and VSS. LS dim. Tele: SR w/ peaked T waves. B. Last BM 3/25. PIV SL to L. Nicotine patch on R shoulder. Abx: IV Rocephin and PO Zithromax. Plan: likely discharge home today w/ home O2. Continue w/ POC.     "

## 2023-04-01 NOTE — PLAN OF CARE
12:22  Had c/o back ache earlier in shift. PRN flexeril helpful. Mobility/ambulates with SBA.  Steady gait.  Lungs: BLUE, diminished. 99% 2L O2. Uses home oxygen 2-3 L at baseline (usually just at night). Tele: SR.  Regular diet. C/o constipation.  PRN mirilax and senna given.  Voiding without difficulty. PIV saline locked. Continue prednisone, eliquis, nicotine patch, rocephin, Zithromax, insulin for steroid induced hyperglycemia. Will discharge home today.  will transport.    13:10 Pt discharged home at this time. She verbalizes understanding of discharge instructions and medications. Pt acknowledges receipt of all belongings.

## 2023-04-01 NOTE — DISCHARGE SUMMARY
[facility]  Discharge Summary  Hospitalist      Date of Admission:  3/26/2023  Date of Discharge:  4/1/2023  Provider:  Luis Alberto Valentin MD. [facility]  Date of Service (when I last saw the patient): 04/01/23      Primary Provider: Chyna Rust          Discharge Diagnosis:     Discharge Diagnoses   1. Acute on chronic hypoxic respiratory failure secondary to COPD exacerbation secondary to bibasilar pneumonia     2. New onset paroxysmal atrial fibrillation with RVR  3. Left upper lobe lung nodule  4. Essential hypertension  5. Tobacco use disorder  6. Anxiety disorder    Other medical issues:  Past Medical History:   Diagnosis Date     Anxiety      COPD (chronic obstructive pulmonary disease)      Hypercholesterolemia      Hypertension      Hypothyroidism          Please see the admission history and physical for full details.     Hospital Course     Lara Melendez was admitted on 3/26/2023.  The following problems were addressed during her hospitalization:    57 year old female Lara Melendez is a 57 year old female with PMH of chronic hypoxic respiratory failure due to COPD with recent exacerbations on 2 L/min oxygen at home at night, discharged from this hospital on 2/13/2023 after admitted for respiratory failure due to COPD, HTN, HLD, hypothyroidism, anxiety, pulmonary nodules and psoriasis who presented to ED via EMS with progressively worsening shortness of breath for the last 5 days  and admitted on 3/26/2023 with acute hypoxic respiratory failure secondary to COPD exacerbation and also found to have A-fib with RVR.    CT shows nonspecific bronchiolitis with bronchial wall thickening and endobronchial mucoid impaction and bibasilar airspace opacities suspicious for pneumonia        Acute on chronic hypoxic respiratory failure secondary to COPD exacerbation secondary to bibasilar pneumonia  Weaned off BiPAP during the day, at the time of discharge she was still requiring 2 to 3 L of  oxygen per nasal cannula, at home she uses oxygen at night only I told her for the next week she can use it during the day as needed to keep saturation above 90%.  She does have an oximeter at home.  In the hospital she was treated with ceftriaxone and and finished a course of Zithromax.  She was discharged with 7 more days of Ceftin  Steroid, transition from Solu-Medrol to prednisone.  Prednisone taper as below     New onset paroxysmal atrial fibrillation with RVR  Was rate controlled with diltiazem CD2 140 mg daily. Avoiding beta-blockers due to reactive airway disease.  Now in sinus rhythm, will discharge with cardiac monitor and follow-up with cardiology.  [id number] score of at least 2, started Eliquis  Echocardiogram with normal EF and no valvular abnormalities.     Left upper lobe lung nodule  4 mm in size, likely postinfectious or postinflammatory per radiology.  Follow-up CT in 12 months.     Essential hypertension  Reasonably controlled with newly added diltiazem and prior to admission losartan 25 mg daily     Tobacco use disorder.  Continue nicotine replacement.    Steroid-induced hyperglycemia  HbA1c is 4.9, given NPH while she was inpatient on IV Solu-Medrol  When transition to p.o. prednisone blood sugars were in the low 100 so she was advised to avoid carbohydrates such as simple sugar pasta and white bread while she is on prednisone taper and follow-up with her primary care physician next week        Pending Results   Unresulted Labs Ordered in the Past 30 Days of this Admission     No orders found from 2/24/2023 to 3/27/2023.          Discharge Orders      Reason for your hospital stay    Severe COPD exacerbation and new atrial fibrillation     Follow-up and recommended labs and tests     Follow-up with primary care physician next week  Follow-up with cardiology in 2 to 3 weeks with event monitor results on new onset atrial fibrillation paroxysmal     Activity    Your activity upon discharge:  activity as tolerated     Adult Cardiac Event Monitor    Eval for paroxysmal atrial fibrillation     Oxygen Adult/Peds    Oxygen Documentation  I certify that this patient, Lara Melendez has been under my care (or a nurse practitioner or physican's assistant working with me). This is the face-to-face encounter for oxygen medical necessity.      At the time of this encounter supplemental oxygen is reasonable and necessary and is expected to improve the patient's condition in a home setting.       Patient has continued oxygen desaturation due to COPD J44.9.    If portability is ordered, is the patient mobile within the home? yes     Diet    Follow this diet upon discharge: Orders Placed This Encounter  Moderate carbohydrate diet while on prednisone avoid sugar and street carbohydrates such as white bread and pasta while taking prednisone       Code Status   Full Code       Primary Care Physician   Chyna Rust    Physical Exam   Temp: 98.5  F (36.9  C) Temp src: Oral BP: 117/61 Pulse: 74   Resp: 20 SpO2: 98 % O2 Device: Nasal cannula Oxygen Delivery: 3 LPM  Vitals:    03/30/23 0515 03/31/23 0613 04/01/23 0618   Weight: 68.8 kg (151 lb 10.8 oz) 68.2 kg (150 lb 4.8 oz) 68.8 kg (151 lb 9.6 oz)     Vital Signs with Ranges  Temp:  [97.5  F (36.4  C)-98.8  F (37.1  C)] 98.5  F (36.9  C)  Pulse:  [74-87] 74  Resp:  [18-20] 20  BP: (117-139)/(61-74) 117/61  SpO2:  [95 %-100 %] 98 %  I/O last 3 completed shifts:  In: 880 [P.O.:880]  Out: -     Constitutional:  alert, cooperative, no apparent distress  Respiratory: No increased work of breathing, good air exchange, no crackles or wheezing.  Cardiovascular: apical impulse,normal S1 and S2  GI: bowel sounds present, soft, non-distended, non-tender      Discharge Disposition   Discharged to home    Consultations This Hospital Stay   PHARMACY LIAISON FOR MEDICATION COVERAGE CONSULT  PHARMACY DISCHARGE EDUCATION BY PHARMACIST  CARE MANAGEMENT / SOCIAL WORK IP CONSULT    Time  Spent on this Encounter   I, Luis Alberto Valentin MD, personally saw the patient today and spent greater than 30 minutes discharging this patient.      Discharge Medications   Current Discharge Medication List      START taking these medications    Details   apixaban ANTICOAGULANT (ELIQUIS) 5 MG tablet Take 1 tablet (5 mg) by mouth 2 times daily  Qty: 60 tablet, Refills: 0    Associated Diagnoses: Atrial fibrillation with rapid ventricular response (H)      cefuroxime (CEFTIN) 500 MG tablet Take 1 tablet (500 mg) by mouth 2 times daily for 7 days  Qty: 14 tablet, Refills: 0    Associated Diagnoses: Chronic obstructive pulmonary disease with acute exacerbation (H)      cyclobenzaprine (FLEXERIL) 5 MG tablet Take 1 tablet (5 mg) by mouth every 8 hours as needed for muscle spasms  Qty: 30 tablet, Refills: 0    Associated Diagnoses: Acute low back pain, unspecified back pain laterality, unspecified whether sciatica present      diclofenac (VOLTAREN) 1 % topical gel Apply 2 g topically 4 times daily  Qty: 100 g, Refills: 0    Associated Diagnoses: Acute low back pain, unspecified back pain laterality, unspecified whether sciatica present      diltiazem ER COATED BEADS (CARDIZEM CD/CARTIA XT) 240 MG 24 hr capsule Take 1 capsule (240 mg) by mouth daily  Qty: 30 capsule, Refills: 0    Associated Diagnoses: Atrial fibrillation with rapid ventricular response (H)      nicotine (NICODERM CQ) 21 MG/24HR 24 hr patch Place 1 patch onto the skin daily  Qty: 28 patch, Refills: 0    Associated Diagnoses: Chronic obstructive pulmonary disease with acute exacerbation (H)         CONTINUE these medications which have CHANGED    Details   predniSONE (DELTASONE) 20 MG tablet Take 60 mg for 2 days, then 40 mg for 3 days, then 20 mg for 3 days, then 10 mg for 3 days, then stop  Qty: 20 tablet, Refills: 0    Associated Diagnoses: Chronic obstructive pulmonary disease with acute exacerbation (H)         CONTINUE these medications which have  NOT CHANGED    Details   albuterol (PROAIR HFA/PROVENTIL HFA/VENTOLIN HFA) 108 (90 Base) MCG/ACT inhaler Inhale 2 puffs into the lungs every 4 hours as needed for shortness of breath / dyspnea or wheezing Inhale 1-2 Puffs every 4 hours as needed for Wheezing.  Qty: 18 g, Refills: 0    Comments: Pharmacy may dispense brand covered by insurance (Proair, or proventil or ventolin or generic albuterol inhaler)  Associated Diagnoses: Chronic obstructive pulmonary disease, unspecified COPD type (H)      albuterol (PROVENTIL) (2.5 MG/3ML) 0.083% neb solution Take 1 vial (2.5 mg) by nebulization every 4 hours as needed for shortness of breath or wheezing  Qty: 30 mL, Refills: 0    Associated Diagnoses: COPD exacerbation (H)      atorvastatin (LIPITOR) 20 MG tablet Take 20 mg by mouth daily      clonazePAM (KLONOPIN) 0.5 MG tablet Take 0.5 mg by mouth daily      fluticasone-salmeterol (ADVAIR-HFA) 230-21 MCG/ACT inhaler Inhale 2 puffs into the lungs 2 times daily  Qty: 12 g, Refills: 0    Associated Diagnoses: COPD exacerbation (H); Acute respiratory failure with hypoxia (H)      ipratropium - albuterol 0.5 mg/2.5 mg/3 mL (DUONEB) 0.5-2.5 (3) MG/3ML neb solution Take 1 vial (3 mLs) by nebulization every 6 hours as needed for shortness of breath / dyspnea or wheezing  Qty: 90 mL, Refills: 1    Associated Diagnoses: COPD exacerbation (H)      levothyroxine (SYNTHROID/LEVOTHROID) 112 MCG tablet Take 112 mcg by mouth daily      losartan (COZAAR) 25 MG tablet Take 1 tablet (25 mg) by mouth daily  Qty: 30 tablet, Refills: 1    Associated Diagnoses: Essential hypertension      PARoxetine (PAXIL) 20 MG tablet Take 20 mg by mouth daily      tiotropium (SPIRIVA RESPIMAT) 2.5 MCG/ACT inhaler Inhale 2 puffs into the lungs daily as needed           Allergies   Allergies   Allergen Reactions     Sulfa Drugs      Doxycycline Other (See Comments)     Develops chest tightness  Intolerance not allergy     Penicillins Rash     Generalized  rash  Rash, Generalized       Data   Most Recent 3 CBC's:Recent Labs   Lab Test 04/01/23  0803 03/29/23  0553 03/26/23  0618 02/12/23  0534   WBC  --  7.1 5.8 5.5   HGB  --  11.0* 11.3* 12.6   MCV  --  109* 106* 106*    214 231 142*      Most Recent 3 BMP's:  Recent Labs   Lab Test 04/01/23  0942 04/01/23  0803 04/01/23  0217 03/31/23  2118 03/29/23  0905 03/29/23  0553 03/26/23  1324 03/26/23  0618 02/13/23  0757 02/12/23  0534   NA  --   --   --   --   --  138  --  141  --  137   POTASSIUM  --   --   --   --   --  3.9  --  4.3  --  4.1   CHLORIDE  --   --   --   --   --  100  --  104  --  101   CO2  --   --   --   --   --  26  --  25  --  26   BUN  --   --   --   --   --  14.2  --  5.7*  --  7.7*   CR  --  0.74  --   --   --  0.65  --  0.66  --  0.50*   ANIONGAP  --   --   --   --   --  12  --  12  --  10   EDIN  --   --   --   --   --  9.3  --  9.0  --  9.0   *  --  101* 101*   < > 159*   < > 89   < > 140*    < > = values in this interval not displayed.     Most Recent 2 LFT's:  Recent Labs   Lab Test 03/26/23 0618 10/26/22  0928   AST 23 62*   ALT 27 39*   ALKPHOS 76 90   BILITOTAL 0.2 0.7     Most Recent INR's and Anticoagulation Dosing History:  Anticoagulation Dose History     Recent Dosing and Labs Latest Ref Rng & Units 5/14/2022 9/27/2022 10/26/2022 3/26/2023    INR 0.85 - 1.15 1.03 1.04 0.91 0.97        Most Recent 3 Troponin's:No lab results found.  Most Recent Cholesterol Panel:No lab results found.  Most Recent 6 Bacteria Isolates From Any Culture (See EPIC Reports for Culture Details):No lab results found.  Most Recent TSH, T4 and A1c Labs:  Recent Labs   Lab Test 03/26/23  1732   A1C 4.9     Results for orders placed or performed during the hospital encounter of 03/26/23   XR Chest Port 1 View    Narrative    EXAM: CHEST SINGLE VIEW PORTABLE  LOCATION: [facility]  DATE/TIME: 03/26/2023, 6:32 AM    INDICATION: Shortness of breath.  COMPARISON:  02/11/2023.    FINDINGS: A few chronic-appearing interstitial opacities again noted within both lungs and likely relate to scarring. The lungs are otherwise clear. Normal-sized cardiac silhouette. Atherosclerotic calcification in the thoracic aorta.      Impression    IMPRESSION: No convincing evidence of active cardiopulmonary disease.      CT Chest Pulmonary Embolism w Contrast    Narrative    EXAM: CT CHEST PULMONARY EMBOLISM W CONTRAST  LOCATION: [facility]  DATE/TIME: 3/26/2023 9:06 AM    INDICATION: Shortness of breath, elevated ddimer  COMPARISON: CT exams 02/11/2023, 10/26/2022 and 2/21/2022  TECHNIQUE: CT chest pulmonary angiogram during arterial phase injection of IV contrast. Multiplanar reformats and MIP reconstructions were performed. Dose reduction techniques were used.   CONTRAST: 53mL Isovue 370    FINDINGS:  ANGIOGRAM CHEST: Pulmonary arteries are normal caliber and negative for pulmonary emboli. Thoracic aorta is negative for dissection. No CT evidence of right heart strain.    LUNGS AND PLEURA: Minimal tracheal secretions. Mild to moderate upper lobar predominant emphysema. Bronchial wall thickening with multifocal endobronchial mucoid impaction. Mild scattered patchy consolidative and groundglass airspace opacities within the   bilateral lower lobes, lingula and right middle lobe. Stable incidental clustered calcified granulomas in the left lung apex. Stable 3 mm right apical nodule image 53 series 7, unchanged since 02/21/2022 and therefore considered benign. Newly visualized   small left upper lobe nodules including a dominant 4 mm nodule image 149 series 7. Clustered tree-in-bud nodules in the lingula likely reflect small airways disease. No pleural effusion.     MEDIASTINUM/AXILLAE: No thoracic adenopathy. Normal heart size and no pericardial effusion.    CORONARY ARTERY CALCIFICATION: Mild.    UPPER ABDOMEN: Cholecystectomy. Stable subtle nodular liver surface contour  which may reflect cirrhosis.    MUSCULOSKELETAL: Mild spinal degenerative changes.      Impression    IMPRESSION:  1.  No pulmonary artery embolism.  2.  Nonspecific bronchiolitis with bronchial wall thickening and endobronchial mucoid impaction.  3.  Mild bibasilar airspace opacities suspicious for developing pneumonic infiltrates.  4.  Mild to moderate emphysema.  5.  Newly visualized left upper lobe 4 mm nodule, likely postinfectious or postinflammatory. Recommend follow-up per guidelines below.      REFERENCE:  Guidelines for Management of Incidental Pulmonary Nodules Detected on CT Images: From the Fleischner Society 2017.   Guidelines apply to incidental nodules in patients who are 35 years or older.  Guidelines do not apply to lung cancer screening, patients with immunosuppression, or patients with known primary cancer.    SINGLE NODULE  Nodule size less than or equal to 6 mm  Low-risk patients: No follow-up needed.  High-risk patients: Optional follow-up at 12 months.         Echocardiogram Complete     Value    LVEF  >70%    Narrative    [id number]  [id number]  [id number]  [id number]^TIO^TEMITOPE^JULIO     [facility]  Echocardiography Laboratory  [address] [place] [address]     Name: ALLYSON HE  MRN: [id number]  : 1965  Study Date: 2023 10:41 AM  Age: 57 yrs  Gender: Female  Patient Location: [facility]  Reason For Study: Atrial Fibrillation  Ordering Physician: TEMITOPE KINSEY  Performed By: RADHA Muñiz     BSA: 1.7 m2  Height: 64 in  Weight: 143 lb  BP: 107/67 mmHg  ______________________________________________________________________________  Procedure  Complete Portable Echo Adult. Optison (NDC #6013-4596) given intravenously.  Technically difficult study.Extremely poor acoustic windows.  ______________________________________________________________________________  Interpretation Summary     Technically challenging study due to poor acoustic  windows.     Hyperdynamic left ventricular function. The visual ejection fraction is >70%.  Endocardial borders are not optimally visualized, however no clear wall motion  abnormalities are seen.  The right ventricle is normal in structure, function and size.  No significant valvular abnormalities.  Dilation of the inferior vena cava is present with abnormal respiratory  variation in diameter.  The rhythm was atrial fibrillation.  ______________________________________________________________________________  Left Ventricle  The left ventricle is normal in size. Hyperdynamic left ventricular function.  The visual ejection fraction is >70%. Endocardial borders are not optimally  visualized, however no clear wall motion abnormalities are seen.     Right Ventricle  The right ventricle is normal in structure, function and size.     Atria  The left atrium is mildly dilated. Right atrial size is normal.     Mitral Valve  The mitral valve is normal in structure and function.     Tricuspid Valve  The tricuspid valve is not well visualized, but is grossly normal. There is  trace tricuspid regurgitation.     Aortic Valve  The aortic valve is not well visualized. Valve morphology is not well  visualized, however on Doppler interrogation it is probably functioning  normally.     Pulmonic Valve  The pulmonic valve is not well seen, but is grossly normal.     Vessels  The aortic root is not well visualized. Normal size ascending aorta. Dilation  of the inferior vena cava is present with abnormal respiratory variation in  diameter.     Pericardium  There is no pericardial effusion.     Rhythm  The rhythm was atrial fibrillation.  ______________________________________________________________________________  MMode/2D Measurements & Calculations  LA Volume (BP): 63.7 ml  LA Volume Index (BP): 37.5 ml/m2  TAPSE: 1.8 cm     Doppler Measurements & Calculations  MV E max swati: 140.3 cm/sec  MV max P.2 mmHg  MV mean P.3 mmHg  MV  V2 VTI: 26.7 cm  MV dec time: 0.16 sec  PA V2 max: 97.7 cm/sec  PA max PG: 3.8 mmHg  PA acc time: 0.11 sec     E/E' av.7  Lateral E/e': 13.4  Medial E/e': 16.0  RV S Stephan: 13.7 cm/sec     ______________________________________________________________________________  Report approved by: Toshia Jimenez 2023 11:50 AM                   Disclaimer: This note consists of symbols derived from keyboarding, dictation and/or voice recognition software. As a result, there may be errors in the script that have gone undetected. Please consider this when interpreting information found in this chart.

## 2023-04-02 ENCOUNTER — NURSE TRIAGE (OUTPATIENT)
Dept: NURSING | Facility: CLINIC | Age: 58
End: 2023-04-02
Payer: COMMERCIAL

## 2023-04-02 NOTE — TELEPHONE ENCOUNTER
Patient calling.    Reports she was recently discharged from hospital (admitted 3/26-4/1), was seen for multiple issues. Discharged with medications.    Reports since ED visit, she has been having worsening swelling of her legs. Denies SOB. Skin feels tight. Skin over legs intact, no new rashes or bruising. Legs appear equally swollen.    Patient thinks the worsening swelling is related to a new medication, eliquis. She is wanting to know if she should continue taking the medication, as she read that the medication could cause swelling. Triaged to first r/o urgent symptoms.    Disposition: See PCP within 24 hours for leg swelling, Call PCP Now for med question    Patient's PCP is with [facility]. Recommended she call their nurse line for further care recommendation. Gave pt [facility] nurse line number.    Casie Patel RN on 4/2/2023 at 5:37 PM    Reason for Disposition    [1] MODERATE leg swelling (e.g., swelling extends up to knees) AND [2] new-onset or worsening    [1] Caller has URGENT medicine question about med that PCP or specialist prescribed AND [2] triager unable to answer question    Additional Information    Negative: SEVERE difficulty breathing (e.g., struggling for each breath, speaks in single words)    Negative: Looks like a broken bone or dislocated joint (e.g., crooked or deformed)    Negative: Sounds like a life-threatening emergency to the triager    Negative: Difficulty breathing at rest    Negative: Entire foot is cool or blue in comparison to other side    Negative: [1] Can't walk or can barely walk AND [2] new-onset    Negative: [1] Difficulty breathing with exertion (e.g., walking) AND [2] new-onset or worsening    Negative: [1] Red area or streak AND [2] fever    Negative: [1] Swelling is painful to touch AND [2] fever    Negative: [1] Cast on leg or ankle AND [2] now increased pain    Negative: Patient sounds very sick or weak to the triager    Negative: SEVERE leg swelling (e.g., swelling  extends above knee, entire leg is swollen, weeping fluid)    Negative: [1] Red area or streak [2] large (> 2 in. or 5 cm)    Negative: [1] Thigh or calf pain AND [2] only 1 side AND [3] present > 1 hour    Negative: [1] Thigh, calf, or ankle swelling AND [2] only 1 side    Negative: [1] Thigh, calf, or ankle swelling AND [2] bilateral AND [3] 1 side is more swollen    Protocols used: LEG SWELLING AND EDEMA-A-AH, MEDICATION QUESTION CALL-A-AH

## 2023-04-03 ENCOUNTER — PATIENT OUTREACH (OUTPATIENT)
Dept: CARE COORDINATION | Facility: CLINIC | Age: 58
End: 2023-04-03
Payer: COMMERCIAL

## 2023-04-03 NOTE — PROGRESS NOTES
[facility]    Background: Transitional Care Management program identified per system criteria and reviewed by [facility] [place] team for possible outreach.    Assessment: Upon chart review, [facility] Team member will not proceed with patient outreach related to this episode of Transitional Care Management program due to reason below:    The CHW called the patient and at this time the patient stated that she was currently waiting at Urgent Care to be seen. Hearing that the CHW did not complete the TCM questions.     Plan: Transitional Care Management episode addressed appropriately per reason noted above.      Donte Lambert JOSE EDUARDO  [place]    *Connected Care Resource Team does NOT follow patient ongoing. Referrals are identified based on internal discharge reports and the outreach is to ensure patient has an understanding of their discharge instructions.

## 2023-04-17 ENCOUNTER — HOSPITAL ENCOUNTER (OUTPATIENT)
Dept: CARDIOLOGY | Facility: CLINIC | Age: 58
Discharge: HOME | End: 2023-04-17
Attending: INTERNAL MEDICINE | Admitting: INTERNAL MEDICINE
Payer: COMMERCIAL

## 2023-04-17 DIAGNOSIS — J44.1 CHRONIC OBSTRUCTIVE PULMONARY DISEASE WITH ACUTE EXACERBATION (H): ICD-10-CM

## 2023-04-17 PROCEDURE — 93270 REMOTE 30 DAY ECG REV/REPORT: CPT

## 2023-04-17 PROCEDURE — 93272 ECG/REVIEW INTERPRET ONLY: CPT | Performed by: INTERNAL MEDICINE

## 2023-06-20 ENCOUNTER — HOSPITAL ENCOUNTER (INPATIENT)
Facility: CLINIC | Age: 58
LOS: 37 days | Discharge: NURSING, REHAB OR LONG-TERM CARE | End: 2023-07-28
Attending: EMERGENCY MEDICINE | Admitting: INTERNAL MEDICINE
Payer: COMMERCIAL

## 2023-06-20 DIAGNOSIS — Z79.01 ANTICOAGULATED: ICD-10-CM

## 2023-06-20 DIAGNOSIS — J44.1 COPD EXACERBATION (H): ICD-10-CM

## 2023-06-20 DIAGNOSIS — E83.42 HYPOMAGNESEMIA: Primary | ICD-10-CM

## 2023-06-20 DIAGNOSIS — J96.21 ACUTE AND CHRONIC RESPIRATORY FAILURE WITH HYPOXIA (H): ICD-10-CM

## 2023-06-20 DIAGNOSIS — J96.22 ACUTE AND CHRONIC RESPIRATORY FAILURE WITH HYPERCAPNIA (H): ICD-10-CM

## 2023-06-20 DIAGNOSIS — I48.0 PAROXYSMAL ATRIAL FIBRILLATION WITH RVR (H): ICD-10-CM

## 2023-06-20 DIAGNOSIS — Z93.3 COLOSTOMY IN PLACE (H): ICD-10-CM

## 2023-06-20 PROCEDURE — 93005 ELECTROCARDIOGRAM TRACING: CPT

## 2023-06-20 PROCEDURE — C9803 HOPD COVID-19 SPEC COLLECT: HCPCS

## 2023-06-20 PROCEDURE — 99285 EMERGENCY DEPT VISIT HI MDM: CPT | Mod: 25

## 2023-06-21 ENCOUNTER — ANESTHESIA (OUTPATIENT)
Dept: INTENSIVE CARE | Facility: CLINIC | Age: 58
End: 2023-06-21
Payer: COMMERCIAL

## 2023-06-21 ENCOUNTER — APPOINTMENT (OUTPATIENT)
Dept: GENERAL RADIOLOGY | Facility: CLINIC | Age: 58
End: 2023-06-21
Attending: SURGERY
Payer: COMMERCIAL

## 2023-06-21 ENCOUNTER — APPOINTMENT (OUTPATIENT)
Dept: GENERAL RADIOLOGY | Facility: CLINIC | Age: 58
End: 2023-06-21
Attending: EMERGENCY MEDICINE
Payer: COMMERCIAL

## 2023-06-21 ENCOUNTER — ANESTHESIA EVENT (OUTPATIENT)
Dept: INTENSIVE CARE | Facility: CLINIC | Age: 58
End: 2023-06-21
Payer: COMMERCIAL

## 2023-06-21 PROBLEM — Z79.01 ANTICOAGULATED: Status: ACTIVE | Noted: 2023-06-21

## 2023-06-21 PROBLEM — J96.22 ACUTE AND CHRONIC RESPIRATORY FAILURE WITH HYPERCAPNIA (H): Status: ACTIVE | Noted: 2023-06-21

## 2023-06-21 PROBLEM — I48.0 PAROXYSMAL ATRIAL FIBRILLATION WITH RVR (H): Status: ACTIVE | Noted: 2023-06-21

## 2023-06-21 LAB
ALBUMIN SERPL BCG-MCNC: 4.5 G/DL (ref 3.5–5.2)
ALP SERPL-CCNC: 76 U/L (ref 35–104)
ALT SERPL W P-5'-P-CCNC: 44 U/L (ref 0–50)
ANION GAP BLD CALCULATED.3IONS-SCNC: 13 MMOL/L (ref 3–14)
ANION GAP SERPL CALCULATED.3IONS-SCNC: 11 MMOL/L (ref 7–15)
ANION GAP SERPL CALCULATED.3IONS-SCNC: 13 MMOL/L (ref 7–15)
AST SERPL W P-5'-P-CCNC: 34 U/L (ref 0–45)
ATRIAL RATE - MUSE: NORMAL BPM
BASE EXCESS BLDV CALC-SCNC: -0.3 MMOL/L (ref -7.7–1.9)
BASE EXCESS BLDV CALC-SCNC: 0.2 MMOL/L (ref -7.7–1.9)
BASE EXCESS BLDV CALC-SCNC: 1.6 MMOL/L (ref -7.7–1.9)
BASE EXCESS BLDV CALC-SCNC: 2.7 MMOL/L (ref -7.7–1.9)
BASE EXCESS BLDV CALC-SCNC: 4.5 MMOL/L (ref -7.7–1.9)
BASOPHILS # BLD AUTO: 0 10E3/UL (ref 0–0.2)
BASOPHILS # BLD AUTO: 0 10E3/UL (ref 0–0.2)
BASOPHILS NFR BLD AUTO: 0 %
BASOPHILS NFR BLD AUTO: 0 %
BILIRUB SERPL-MCNC: 0.2 MG/DL
BUN SERPL-MCNC: 7 MG/DL (ref 7–30)
BUN SERPL-MCNC: 7.9 MG/DL (ref 8–23)
BUN SERPL-MCNC: 8.8 MG/DL (ref 8–23)
CA-I BLD-MCNC: 5 MG/DL (ref 4.4–5.2)
CALCIUM SERPL-MCNC: 8.9 MG/DL (ref 8.6–10)
CALCIUM SERPL-MCNC: 9.1 MG/DL (ref 8.6–10)
CHLORIDE BLD-SCNC: 99 MMOL/L (ref 94–109)
CHLORIDE SERPL-SCNC: 100 MMOL/L (ref 98–107)
CHLORIDE SERPL-SCNC: 99 MMOL/L (ref 98–107)
CO2 BLD-SCNC: 33 MMOL/L (ref 20–32)
CREAT BLD-MCNC: 0.6 MG/DL (ref 0.5–1)
CREAT SERPL-MCNC: 0.62 MG/DL (ref 0.51–0.95)
CREAT SERPL-MCNC: 0.63 MG/DL (ref 0.51–0.95)
DEPRECATED HCO3 PLAS-SCNC: 26 MMOL/L (ref 22–29)
DEPRECATED HCO3 PLAS-SCNC: 28 MMOL/L (ref 22–29)
DIASTOLIC BLOOD PRESSURE - MUSE: NORMAL MMHG
EOSINOPHIL # BLD AUTO: 0 10E3/UL (ref 0–0.7)
EOSINOPHIL # BLD AUTO: 0.1 10E3/UL (ref 0–0.7)
EOSINOPHIL NFR BLD AUTO: 0 %
EOSINOPHIL NFR BLD AUTO: 1 %
ERYTHROCYTE [DISTWIDTH] IN BLOOD BY AUTOMATED COUNT: 12.9 % (ref 10–15)
FLUAV RNA SPEC QL NAA+PROBE: NEGATIVE
FLUBV RNA RESP QL NAA+PROBE: NEGATIVE
GFR SERPL CREATININE-BSD FRML MDRD: >90 ML/MIN/1.73M2
GFR SERPL CREATININE-BSD FRML MDRD: >90 ML/MIN/1.73M2
GLUCOSE BLD-MCNC: 126 MG/DL (ref 70–99)
GLUCOSE BLDC GLUCOMTR-MCNC: 129 MG/DL (ref 70–99)
GLUCOSE BLDC GLUCOMTR-MCNC: 130 MG/DL (ref 70–99)
GLUCOSE BLDC GLUCOMTR-MCNC: 134 MG/DL (ref 70–99)
GLUCOSE BLDC GLUCOMTR-MCNC: 138 MG/DL (ref 70–99)
GLUCOSE BLDC GLUCOMTR-MCNC: 139 MG/DL (ref 70–99)
GLUCOSE BLDC GLUCOMTR-MCNC: 142 MG/DL (ref 70–99)
GLUCOSE BLDC GLUCOMTR-MCNC: 153 MG/DL (ref 70–99)
GLUCOSE BLDC GLUCOMTR-MCNC: 90 MG/DL (ref 70–99)
GLUCOSE SERPL-MCNC: 126 MG/DL (ref 70–99)
GLUCOSE SERPL-MCNC: 151 MG/DL (ref 70–99)
HCO3 BLDV-SCNC: 28 MMOL/L (ref 21–28)
HCO3 BLDV-SCNC: 28 MMOL/L (ref 21–28)
HCO3 BLDV-SCNC: 29 MMOL/L (ref 21–28)
HCO3 BLDV-SCNC: 29 MMOL/L (ref 21–28)
HCO3 BLDV-SCNC: 31 MMOL/L (ref 21–28)
HCO3 BLDV-SCNC: 34 MMOL/L (ref 21–28)
HCT VFR BLD AUTO: 38.6 % (ref 35–47)
HCT VFR BLD AUTO: 38.8 % (ref 35–47)
HCT VFR BLD AUTO: 40.5 % (ref 35–47)
HCT VFR BLD CALC: 41 % (ref 35–47)
HGB BLD-MCNC: 12.5 G/DL (ref 11.7–15.7)
HGB BLD-MCNC: 12.5 G/DL (ref 11.7–15.7)
HGB BLD-MCNC: 13.5 G/DL (ref 11.7–15.7)
HGB BLD-MCNC: 13.9 G/DL (ref 11.7–15.7)
IMM GRANULOCYTES # BLD: 0.1 10E3/UL
IMM GRANULOCYTES # BLD: 0.1 10E3/UL
IMM GRANULOCYTES NFR BLD: 1 %
IMM GRANULOCYTES NFR BLD: 1 %
INR PPP: 1.04 (ref 0.85–1.15)
INTERPRETATION ECG - MUSE: NORMAL
LACTATE BLD-SCNC: 0.8 MMOL/L
LACTATE SERPL-SCNC: 0.9 MMOL/L (ref 0.7–2)
LYMPHOCYTES # BLD AUTO: 0.7 10E3/UL (ref 0.8–5.3)
LYMPHOCYTES # BLD AUTO: 1.7 10E3/UL (ref 0.8–5.3)
LYMPHOCYTES NFR BLD AUTO: 15 %
LYMPHOCYTES NFR BLD AUTO: 7 %
MAGNESIUM SERPL-MCNC: 2.1 MG/DL (ref 1.7–2.3)
MAGNESIUM SERPL-MCNC: 2.5 MG/DL (ref 1.7–2.3)
MCH RBC QN AUTO: 32.6 PG (ref 26.5–33)
MCH RBC QN AUTO: 33 PG (ref 26.5–33)
MCH RBC QN AUTO: 33.8 PG (ref 26.5–33)
MCHC RBC AUTO-ENTMCNC: 32.2 G/DL (ref 31.5–36.5)
MCHC RBC AUTO-ENTMCNC: 32.4 G/DL (ref 31.5–36.5)
MCHC RBC AUTO-ENTMCNC: 33.3 G/DL (ref 31.5–36.5)
MCV RBC AUTO: 101 FL (ref 78–100)
MCV RBC AUTO: 102 FL (ref 78–100)
MCV RBC AUTO: 102 FL (ref 78–100)
MONOCYTES # BLD AUTO: 0.5 10E3/UL (ref 0–1.3)
MONOCYTES # BLD AUTO: 1 10E3/UL (ref 0–1.3)
MONOCYTES NFR BLD AUTO: 5 %
MONOCYTES NFR BLD AUTO: 8 %
NEUTROPHILS # BLD AUTO: 8.9 10E3/UL (ref 1.6–8.3)
NEUTROPHILS # BLD AUTO: 8.9 10E3/UL (ref 1.6–8.3)
NEUTROPHILS NFR BLD AUTO: 75 %
NEUTROPHILS NFR BLD AUTO: 87 %
NRBC # BLD AUTO: 0 10E3/UL
NRBC # BLD AUTO: 0 10E3/UL
NRBC BLD AUTO-RTO: 0 /100
NRBC BLD AUTO-RTO: 0 /100
NT-PROBNP SERPL-MCNC: 69 PG/ML (ref 0–900)
O2/TOTAL GAS SETTING VFR VENT: 25 %
O2/TOTAL GAS SETTING VFR VENT: 25 %
O2/TOTAL GAS SETTING VFR VENT: 30 %
O2/TOTAL GAS SETTING VFR VENT: 40 %
O2/TOTAL GAS SETTING VFR VENT: 50 %
P AXIS - MUSE: NORMAL DEGREES
PCO2 BLDV: 48 MM HG (ref 40–50)
PCO2 BLDV: 49 MM HG (ref 40–50)
PCO2 BLDV: 55 MM HG (ref 40–50)
PCO2 BLDV: 59 MM HG (ref 40–50)
PCO2 BLDV: 69 MM HG (ref 40–50)
PCO2 BLDV: 92 MM HG (ref 40–50)
PH BLDV: 7.17 [PH] (ref 7.32–7.43)
PH BLDV: 7.24 [PH] (ref 7.32–7.43)
PH BLDV: 7.28 [PH] (ref 7.32–7.43)
PH BLDV: 7.36 [PH] (ref 7.32–7.43)
PH BLDV: 7.36 [PH] (ref 7.32–7.43)
PH BLDV: 7.38 [PH] (ref 7.32–7.43)
PLATELET # BLD AUTO: 201 10E3/UL (ref 150–450)
PLATELET # BLD AUTO: 226 10E3/UL (ref 150–450)
PLATELET # BLD AUTO: 258 10E3/UL (ref 150–450)
PO2 BLDV: 223 MM HG (ref 25–47)
PO2 BLDV: 384 MM HG (ref 25–47)
PO2 BLDV: 59 MM HG (ref 25–47)
PO2 BLDV: 70 MM HG (ref 25–47)
PO2 BLDV: 71 MM HG (ref 25–47)
PO2 BLDV: 97 MM HG (ref 25–47)
POTASSIUM BLD-SCNC: 5.1 MMOL/L (ref 3.4–5.3)
POTASSIUM SERPL-SCNC: 4.4 MMOL/L (ref 3.4–5.3)
POTASSIUM SERPL-SCNC: 5.3 MMOL/L (ref 3.4–5.3)
PR INTERVAL - MUSE: NORMAL MS
PROCALCITONIN SERPL IA-MCNC: 0.04 NG/ML
PROT SERPL-MCNC: 7.5 G/DL (ref 6.4–8.3)
QRS DURATION - MUSE: 64 MS
QT - MUSE: 296 MS
QTC - MUSE: 445 MS
R AXIS - MUSE: 67 DEGREES
RBC # BLD AUTO: 3.79 10E6/UL (ref 3.8–5.2)
RBC # BLD AUTO: 3.83 10E6/UL (ref 3.8–5.2)
RBC # BLD AUTO: 3.99 10E6/UL (ref 3.8–5.2)
RSV RNA SPEC NAA+PROBE: NEGATIVE
SAO2 % BLDV: 100 % (ref 94–100)
SARS-COV-2 RNA RESP QL NAA+PROBE: NEGATIVE
SODIUM BLD-SCNC: 139 MMOL/L (ref 133–144)
SODIUM SERPL-SCNC: 138 MMOL/L (ref 136–145)
SODIUM SERPL-SCNC: 139 MMOL/L (ref 136–145)
SYSTOLIC BLOOD PRESSURE - MUSE: NORMAL MMHG
T AXIS - MUSE: -20 DEGREES
TROPONIN T SERPL HS-MCNC: 12 NG/L
VENTRICULAR RATE- MUSE: 136 BPM
WBC # BLD AUTO: 10.1 10E3/UL (ref 4–11)
WBC # BLD AUTO: 11.8 10E3/UL (ref 4–11)
WBC # BLD AUTO: 8.5 10E3/UL (ref 4–11)

## 2023-06-21 PROCEDURE — 82310 ASSAY OF CALCIUM: CPT | Performed by: INTERNAL MEDICINE

## 2023-06-21 PROCEDURE — 5A1955Z RESPIRATORY VENTILATION, GREATER THAN 96 CONSECUTIVE HOURS: ICD-10-PCS | Performed by: SURGERY

## 2023-06-21 PROCEDURE — 36415 COLL VENOUS BLD VENIPUNCTURE: CPT | Performed by: INTERNAL MEDICINE

## 2023-06-21 PROCEDURE — 85048 AUTOMATED LEUKOCYTE COUNT: CPT | Performed by: EMERGENCY MEDICINE

## 2023-06-21 PROCEDURE — 250N000012 HC RX MED GY IP 250 OP 636 PS 637: Performed by: INTERNAL MEDICINE

## 2023-06-21 PROCEDURE — 99291 CRITICAL CARE FIRST HOUR: CPT | Performed by: SURGERY

## 2023-06-21 PROCEDURE — 84484 ASSAY OF TROPONIN QUANT: CPT | Performed by: EMERGENCY MEDICINE

## 2023-06-21 PROCEDURE — 85610 PROTHROMBIN TIME: CPT | Performed by: EMERGENCY MEDICINE

## 2023-06-21 PROCEDURE — 71045 X-RAY EXAM CHEST 1 VIEW: CPT

## 2023-06-21 PROCEDURE — 250N000011 HC RX IP 250 OP 636: Performed by: INTERNAL MEDICINE

## 2023-06-21 PROCEDURE — 250N000009 HC RX 250: Performed by: EMERGENCY MEDICINE

## 2023-06-21 PROCEDURE — 94660 CPAP INITIATION&MGMT: CPT

## 2023-06-21 PROCEDURE — 999N000157 HC STATISTIC RCP TIME EA 10 MIN

## 2023-06-21 PROCEDURE — 370N000003 HC ANESTHESIA WARD SERVICE: Performed by: NURSE ANESTHETIST, CERTIFIED REGISTERED

## 2023-06-21 PROCEDURE — 82803 BLOOD GASES ANY COMBINATION: CPT | Performed by: EMERGENCY MEDICINE

## 2023-06-21 PROCEDURE — 999N000065 XR CHEST PORT 1 VIEW

## 2023-06-21 PROCEDURE — 82962 GLUCOSE BLOOD TEST: CPT

## 2023-06-21 PROCEDURE — 999N000065 XR ABDOMEN PORT 1 VIEW

## 2023-06-21 PROCEDURE — 85027 COMPLETE CBC AUTOMATED: CPT | Performed by: INTERNAL MEDICINE

## 2023-06-21 PROCEDURE — 258N000003 HC RX IP 258 OP 636: Performed by: HOSPITALIST

## 2023-06-21 PROCEDURE — 83605 ASSAY OF LACTIC ACID: CPT

## 2023-06-21 PROCEDURE — C9113 INJ PANTOPRAZOLE SODIUM, VIA: HCPCS | Mod: JZ | Performed by: HOSPITALIST

## 2023-06-21 PROCEDURE — 83735 ASSAY OF MAGNESIUM: CPT | Performed by: INTERNAL MEDICINE

## 2023-06-21 PROCEDURE — 36415 COLL VENOUS BLD VENIPUNCTURE: CPT | Performed by: HOSPITALIST

## 2023-06-21 PROCEDURE — 99222 1ST HOSP IP/OBS MODERATE 55: CPT | Performed by: INTERNAL MEDICINE

## 2023-06-21 PROCEDURE — 82803 BLOOD GASES ANY COMBINATION: CPT | Performed by: SURGERY

## 2023-06-21 PROCEDURE — 80047 BASIC METABLC PNL IONIZED CA: CPT

## 2023-06-21 PROCEDURE — 94002 VENT MGMT INPAT INIT DAY: CPT

## 2023-06-21 PROCEDURE — 250N000009 HC RX 250: Performed by: INTERNAL MEDICINE

## 2023-06-21 PROCEDURE — 258N000003 HC RX IP 258 OP 636: Performed by: INTERNAL MEDICINE

## 2023-06-21 PROCEDURE — 3E033XZ INTRODUCTION OF VASOPRESSOR INTO PERIPHERAL VEIN, PERCUTANEOUS APPROACH: ICD-10-PCS | Performed by: INTERNAL MEDICINE

## 2023-06-21 PROCEDURE — 82803 BLOOD GASES ANY COMBINATION: CPT | Performed by: INTERNAL MEDICINE

## 2023-06-21 PROCEDURE — 80053 COMPREHEN METABOLIC PANEL: CPT | Performed by: EMERGENCY MEDICINE

## 2023-06-21 PROCEDURE — 36415 COLL VENOUS BLD VENIPUNCTURE: CPT | Performed by: EMERGENCY MEDICINE

## 2023-06-21 PROCEDURE — 83880 ASSAY OF NATRIURETIC PEPTIDE: CPT | Performed by: EMERGENCY MEDICINE

## 2023-06-21 PROCEDURE — 250N000011 HC RX IP 250 OP 636

## 2023-06-21 PROCEDURE — 36415 COLL VENOUS BLD VENIPUNCTURE: CPT | Performed by: SURGERY

## 2023-06-21 PROCEDURE — 250N000013 HC RX MED GY IP 250 OP 250 PS 637: Performed by: INTERNAL MEDICINE

## 2023-06-21 PROCEDURE — 94640 AIRWAY INHALATION TREATMENT: CPT | Mod: 76

## 2023-06-21 PROCEDURE — 94640 AIRWAY INHALATION TREATMENT: CPT

## 2023-06-21 PROCEDURE — 200N000001 HC R&B ICU

## 2023-06-21 PROCEDURE — 250N000011 HC RX IP 250 OP 636: Performed by: HOSPITALIST

## 2023-06-21 PROCEDURE — 250N000011 HC RX IP 250 OP 636: Performed by: SURGERY

## 2023-06-21 PROCEDURE — 83735 ASSAY OF MAGNESIUM: CPT | Performed by: EMERGENCY MEDICINE

## 2023-06-21 PROCEDURE — 85027 COMPLETE CBC AUTOMATED: CPT | Performed by: HOSPITALIST

## 2023-06-21 PROCEDURE — 99223 1ST HOSP IP/OBS HIGH 75: CPT | Mod: AI | Performed by: INTERNAL MEDICINE

## 2023-06-21 PROCEDURE — 87637 SARSCOV2&INF A&B&RSV AMP PRB: CPT | Performed by: EMERGENCY MEDICINE

## 2023-06-21 PROCEDURE — 250N000009 HC RX 250

## 2023-06-21 PROCEDURE — 83605 ASSAY OF LACTIC ACID: CPT | Performed by: EMERGENCY MEDICINE

## 2023-06-21 PROCEDURE — 250N000011 HC RX IP 250 OP 636: Performed by: NURSE ANESTHETIST, CERTIFIED REGISTERED

## 2023-06-21 PROCEDURE — 84145 PROCALCITONIN (PCT): CPT | Performed by: EMERGENCY MEDICINE

## 2023-06-21 RX ORDER — SODIUM CHLORIDE, SODIUM LACTATE, POTASSIUM CHLORIDE, CALCIUM CHLORIDE 600; 310; 30; 20 MG/100ML; MG/100ML; MG/100ML; MG/100ML
INJECTION, SOLUTION INTRAVENOUS CONTINUOUS
Status: DISCONTINUED | OUTPATIENT
Start: 2023-06-21 | End: 2023-07-18

## 2023-06-21 RX ORDER — LEVALBUTEROL INHALATION SOLUTION 0.63 MG/3ML
0.63 SOLUTION RESPIRATORY (INHALATION) 4 TIMES DAILY
Status: DISCONTINUED | OUTPATIENT
Start: 2023-06-21 | End: 2023-07-28 | Stop reason: HOSPADM

## 2023-06-21 RX ORDER — AMOXICILLIN 250 MG
1 CAPSULE ORAL 2 TIMES DAILY PRN
Status: DISCONTINUED | OUTPATIENT
Start: 2023-06-21 | End: 2023-06-23

## 2023-06-21 RX ORDER — PROCHLORPERAZINE MALEATE 5 MG
10 TABLET ORAL EVERY 6 HOURS PRN
Status: DISCONTINUED | OUTPATIENT
Start: 2023-06-21 | End: 2023-07-06

## 2023-06-21 RX ORDER — IPRATROPIUM BROMIDE AND ALBUTEROL SULFATE 2.5; .5 MG/3ML; MG/3ML
3 SOLUTION RESPIRATORY (INHALATION)
Status: DISCONTINUED | OUTPATIENT
Start: 2023-06-21 | End: 2023-06-21

## 2023-06-21 RX ORDER — LORAZEPAM 2 MG/ML
0.5 INJECTION INTRAMUSCULAR EVERY 4 HOURS PRN
Status: DISCONTINUED | OUTPATIENT
Start: 2023-06-21 | End: 2023-07-03

## 2023-06-21 RX ORDER — LEVALBUTEROL INHALATION SOLUTION 0.63 MG/3ML
0.63 SOLUTION RESPIRATORY (INHALATION)
Status: DISCONTINUED | OUTPATIENT
Start: 2023-06-21 | End: 2023-07-06

## 2023-06-21 RX ORDER — PROPOFOL 10 MG/ML
5-75 INJECTION, EMULSION INTRAVENOUS CONTINUOUS
Status: DISCONTINUED | OUTPATIENT
Start: 2023-06-21 | End: 2023-06-21

## 2023-06-21 RX ORDER — LORAZEPAM 2 MG/ML
0.5 INJECTION INTRAMUSCULAR ONCE
Status: DISCONTINUED | OUTPATIENT
Start: 2023-06-21 | End: 2023-06-21

## 2023-06-21 RX ORDER — NICOTINE POLACRILEX 4 MG
15-30 LOZENGE BUCCAL
Status: DISCONTINUED | OUTPATIENT
Start: 2023-06-21 | End: 2023-07-05

## 2023-06-21 RX ORDER — DILTIAZEM HYDROCHLORIDE 120 MG/1
240 CAPSULE, COATED, EXTENDED RELEASE ORAL DAILY
Status: DISCONTINUED | OUTPATIENT
Start: 2023-06-21 | End: 2023-06-22

## 2023-06-21 RX ORDER — AZITHROMYCIN 500 MG/5ML
500 INJECTION, POWDER, LYOPHILIZED, FOR SOLUTION INTRAVENOUS EVERY 24 HOURS
Status: COMPLETED | OUTPATIENT
Start: 2023-06-21 | End: 2023-06-24

## 2023-06-21 RX ORDER — PROPOFOL 10 MG/ML
INJECTION, EMULSION INTRAVENOUS
Status: DISCONTINUED | OUTPATIENT
Start: 2023-06-21 | End: 2023-06-21

## 2023-06-21 RX ORDER — NALOXONE HYDROCHLORIDE 0.4 MG/ML
0.2 INJECTION, SOLUTION INTRAMUSCULAR; INTRAVENOUS; SUBCUTANEOUS
Status: DISCONTINUED | OUTPATIENT
Start: 2023-06-21 | End: 2023-07-28 | Stop reason: HOSPADM

## 2023-06-21 RX ORDER — CYCLOBENZAPRINE HCL 5 MG
5 TABLET ORAL EVERY 8 HOURS PRN
Status: DISCONTINUED | OUTPATIENT
Start: 2023-06-21 | End: 2023-06-23

## 2023-06-21 RX ORDER — VECURONIUM BROMIDE 1 MG/ML
7 INJECTION, POWDER, LYOPHILIZED, FOR SOLUTION INTRAVENOUS ONCE
Status: COMPLETED | OUTPATIENT
Start: 2023-06-22 | End: 2023-06-21

## 2023-06-21 RX ORDER — PROCHLORPERAZINE 25 MG
25 SUPPOSITORY, RECTAL RECTAL EVERY 12 HOURS PRN
Status: DISCONTINUED | OUTPATIENT
Start: 2023-06-21 | End: 2023-07-06

## 2023-06-21 RX ORDER — HYDRALAZINE HYDROCHLORIDE 20 MG/ML
10 INJECTION INTRAMUSCULAR; INTRAVENOUS EVERY 6 HOURS PRN
Status: DISCONTINUED | OUTPATIENT
Start: 2023-06-21 | End: 2023-07-28 | Stop reason: HOSPADM

## 2023-06-21 RX ORDER — NALOXONE HYDROCHLORIDE 0.4 MG/ML
0.4 INJECTION, SOLUTION INTRAMUSCULAR; INTRAVENOUS; SUBCUTANEOUS
Status: DISCONTINUED | OUTPATIENT
Start: 2023-06-21 | End: 2023-07-28 | Stop reason: HOSPADM

## 2023-06-21 RX ORDER — AZITHROMYCIN 250 MG/1
250 TABLET, FILM COATED ORAL DAILY
Status: DISCONTINUED | OUTPATIENT
Start: 2023-06-21 | End: 2023-06-21

## 2023-06-21 RX ORDER — LEVALBUTEROL INHALATION SOLUTION 0.63 MG/3ML
0.63 SOLUTION RESPIRATORY (INHALATION) EVERY 4 HOURS PRN
Status: DISCONTINUED | OUTPATIENT
Start: 2023-06-21 | End: 2023-06-21

## 2023-06-21 RX ORDER — PAROXETINE 20 MG/1
20 TABLET, FILM COATED ORAL DAILY
Status: DISCONTINUED | OUTPATIENT
Start: 2023-06-21 | End: 2023-06-23

## 2023-06-21 RX ORDER — NICOTINE POLACRILEX 4 MG
15-30 LOZENGE BUCCAL
Status: DISCONTINUED | OUTPATIENT
Start: 2023-06-21 | End: 2023-06-21

## 2023-06-21 RX ORDER — METHYLPREDNISOLONE SODIUM SUCCINATE 125 MG/2ML
60 INJECTION, POWDER, LYOPHILIZED, FOR SOLUTION INTRAMUSCULAR; INTRAVENOUS 2 TIMES DAILY
Status: DISCONTINUED | OUTPATIENT
Start: 2023-06-21 | End: 2023-06-22

## 2023-06-21 RX ORDER — CLONAZEPAM 0.5 MG/1
0.5 TABLET ORAL DAILY
Status: DISCONTINUED | OUTPATIENT
Start: 2023-06-21 | End: 2023-06-22

## 2023-06-21 RX ORDER — DEXTROSE MONOHYDRATE 25 G/50ML
25-50 INJECTION, SOLUTION INTRAVENOUS
Status: DISCONTINUED | OUTPATIENT
Start: 2023-06-21 | End: 2023-07-05

## 2023-06-21 RX ORDER — PROPOFOL 10 MG/ML
5-75 INJECTION, EMULSION INTRAVENOUS CONTINUOUS
Status: DISCONTINUED | OUTPATIENT
Start: 2023-06-21 | End: 2023-06-30

## 2023-06-21 RX ORDER — PROPOFOL 10 MG/ML
INJECTION, EMULSION INTRAVENOUS
Status: COMPLETED
Start: 2023-06-21 | End: 2023-06-21

## 2023-06-21 RX ORDER — LOSARTAN POTASSIUM 25 MG/1
25 TABLET ORAL DAILY
Status: DISCONTINUED | OUTPATIENT
Start: 2023-06-21 | End: 2023-06-22

## 2023-06-21 RX ORDER — ONDANSETRON 2 MG/ML
4 INJECTION INTRAMUSCULAR; INTRAVENOUS EVERY 6 HOURS PRN
Status: DISCONTINUED | OUTPATIENT
Start: 2023-06-21 | End: 2023-07-28 | Stop reason: HOSPADM

## 2023-06-21 RX ORDER — ONDANSETRON 4 MG/1
4 TABLET, ORALLY DISINTEGRATING ORAL EVERY 6 HOURS PRN
Status: DISCONTINUED | OUTPATIENT
Start: 2023-06-21 | End: 2023-07-28 | Stop reason: HOSPADM

## 2023-06-21 RX ORDER — POLYETHYLENE GLYCOL 3350 17 G/17G
17 POWDER, FOR SOLUTION ORAL DAILY PRN
Status: DISCONTINUED | OUTPATIENT
Start: 2023-06-21 | End: 2023-07-06

## 2023-06-21 RX ORDER — ATORVASTATIN CALCIUM 20 MG/1
20 TABLET, FILM COATED ORAL DAILY
Status: DISCONTINUED | OUTPATIENT
Start: 2023-06-21 | End: 2023-06-23

## 2023-06-21 RX ORDER — DEXTROSE MONOHYDRATE 25 G/50ML
25-50 INJECTION, SOLUTION INTRAVENOUS
Status: DISCONTINUED | OUTPATIENT
Start: 2023-06-21 | End: 2023-06-21

## 2023-06-21 RX ORDER — FLUTICASONE FUROATE AND VILANTEROL 200; 25 UG/1; UG/1
1 POWDER RESPIRATORY (INHALATION) DAILY
Status: DISCONTINUED | OUTPATIENT
Start: 2023-06-21 | End: 2023-06-23

## 2023-06-21 RX ORDER — VECURONIUM BROMIDE 1 MG/ML
INJECTION, POWDER, LYOPHILIZED, FOR SOLUTION INTRAVENOUS
Status: COMPLETED
Start: 2023-06-21 | End: 2023-06-21

## 2023-06-21 RX ORDER — AMOXICILLIN 250 MG
2 CAPSULE ORAL 2 TIMES DAILY PRN
Status: DISCONTINUED | OUTPATIENT
Start: 2023-06-21 | End: 2023-06-23

## 2023-06-21 RX ORDER — NICOTINE 21 MG/24HR
1 PATCH, TRANSDERMAL 24 HOURS TRANSDERMAL DAILY
Status: DISCONTINUED | OUTPATIENT
Start: 2023-06-21 | End: 2023-07-28 | Stop reason: HOSPADM

## 2023-06-21 RX ORDER — LEVOTHYROXINE SODIUM 112 UG/1
112 TABLET ORAL DAILY
Status: DISCONTINUED | OUTPATIENT
Start: 2023-06-21 | End: 2023-06-23

## 2023-06-21 RX ADMIN — MIDAZOLAM 2 MG: 1 INJECTION INTRAMUSCULAR; INTRAVENOUS at 12:02

## 2023-06-21 RX ADMIN — PROPOFOL 5 MCG/KG/MIN: 10 INJECTION, EMULSION INTRAVENOUS at 11:34

## 2023-06-21 RX ADMIN — LEVALBUTEROL HYDROCHLORIDE 0.63 MG: 0.63 SOLUTION RESPIRATORY (INHALATION) at 19:51

## 2023-06-21 RX ADMIN — LORAZEPAM 0.5 MG: 2 INJECTION INTRAMUSCULAR; INTRAVENOUS at 10:37

## 2023-06-21 RX ADMIN — LEVOTHYROXINE SODIUM 112 MCG: 0.11 TABLET ORAL at 08:09

## 2023-06-21 RX ADMIN — IPRATROPIUM BROMIDE AND ALBUTEROL SULFATE 3 ML: .5; 3 SOLUTION RESPIRATORY (INHALATION) at 00:08

## 2023-06-21 RX ADMIN — IPRATROPIUM BROMIDE AND ALBUTEROL SULFATE 3 ML: .5; 3 SOLUTION RESPIRATORY (INHALATION) at 00:18

## 2023-06-21 RX ADMIN — APIXABAN 5 MG: 5 TABLET, FILM COATED ORAL at 08:09

## 2023-06-21 RX ADMIN — LEVALBUTEROL HYDROCHLORIDE 0.63 MG: 0.63 SOLUTION RESPIRATORY (INHALATION) at 12:11

## 2023-06-21 RX ADMIN — METHYLPREDNISOLONE SODIUM SUCCINATE 62.5 MG: 125 INJECTION INTRAMUSCULAR; INTRAVENOUS at 20:28

## 2023-06-21 RX ADMIN — LEVALBUTEROL HYDROCHLORIDE 0.63 MG: 0.63 SOLUTION RESPIRATORY (INHALATION) at 08:12

## 2023-06-21 RX ADMIN — CLONAZEPAM 0.5 MG: 0.5 TABLET ORAL at 08:09

## 2023-06-21 RX ADMIN — PROPOFOL 70 MCG/KG/MIN: 10 INJECTION, EMULSION INTRAVENOUS at 21:33

## 2023-06-21 RX ADMIN — LEVALBUTEROL HYDROCHLORIDE 0.63 MG: 0.63 SOLUTION RESPIRATORY (INHALATION) at 21:49

## 2023-06-21 RX ADMIN — SODIUM CHLORIDE, POTASSIUM CHLORIDE, SODIUM LACTATE AND CALCIUM CHLORIDE: 600; 310; 30; 20 INJECTION, SOLUTION INTRAVENOUS at 03:32

## 2023-06-21 RX ADMIN — Medication 10 MG: at 20:17

## 2023-06-21 RX ADMIN — METHYLPREDNISOLONE SODIUM SUCCINATE 62.5 MG: 125 INJECTION INTRAMUSCULAR; INTRAVENOUS at 08:02

## 2023-06-21 RX ADMIN — PROPOFOL 100 MG: 10 INJECTION, EMULSION INTRAVENOUS at 11:33

## 2023-06-21 RX ADMIN — INSULIN ASPART 1 UNITS: 100 INJECTION, SOLUTION INTRAVENOUS; SUBCUTANEOUS at 03:39

## 2023-06-21 RX ADMIN — VECURONIUM BROMIDE 7 MG: 1 INJECTION, POWDER, LYOPHILIZED, FOR SOLUTION INTRAVENOUS at 23:57

## 2023-06-21 RX ADMIN — AZITHROMYCIN MONOHYDRATE 500 MG: 500 INJECTION, POWDER, LYOPHILIZED, FOR SOLUTION INTRAVENOUS at 12:30

## 2023-06-21 RX ADMIN — PANTOPRAZOLE SODIUM 40 MG: 40 INJECTION, POWDER, FOR SOLUTION INTRAVENOUS at 20:28

## 2023-06-21 RX ADMIN — MIDAZOLAM 4 MG: 1 INJECTION INTRAMUSCULAR; INTRAVENOUS at 23:10

## 2023-06-21 RX ADMIN — PAROXETINE HYDROCHLORIDE 20 MG: 20 TABLET, FILM COATED ORAL at 08:09

## 2023-06-21 RX ADMIN — IPRATROPIUM BROMIDE 0.5 MG: 0.5 SOLUTION RESPIRATORY (INHALATION) at 08:13

## 2023-06-21 RX ADMIN — IPRATROPIUM BROMIDE 0.5 MG: 0.5 SOLUTION RESPIRATORY (INHALATION) at 19:51

## 2023-06-21 RX ADMIN — Medication 100 MG: at 11:29

## 2023-06-21 RX ADMIN — IPRATROPIUM BROMIDE 0.5 MG: 0.5 SOLUTION RESPIRATORY (INHALATION) at 15:07

## 2023-06-21 RX ADMIN — LORAZEPAM 0.5 MG: 2 INJECTION INTRAMUSCULAR; INTRAVENOUS at 03:30

## 2023-06-21 RX ADMIN — PROPOFOL 100 MG: 10 INJECTION, EMULSION INTRAVENOUS at 11:29

## 2023-06-21 RX ADMIN — IPRATROPIUM BROMIDE 0.5 MG: 0.5 SOLUTION RESPIRATORY (INHALATION) at 12:12

## 2023-06-21 RX ADMIN — Medication 100 MCG/HR: at 11:59

## 2023-06-21 RX ADMIN — DILTIAZEM HYDROCHLORIDE 240 MG: 120 CAPSULE, COATED, EXTENDED RELEASE ORAL at 08:09

## 2023-06-21 RX ADMIN — NICOTINE 1 PATCH: 14 PATCH, EXTENDED RELEASE TRANSDERMAL at 08:02

## 2023-06-21 RX ADMIN — Medication 50 MCG: at 20:16

## 2023-06-21 RX ADMIN — LEVALBUTEROL HYDROCHLORIDE 0.63 MG: 0.63 SOLUTION RESPIRATORY (INHALATION) at 15:07

## 2023-06-21 RX ADMIN — HYDRALAZINE HYDROCHLORIDE 10 MG: 20 INJECTION INTRAMUSCULAR; INTRAVENOUS at 10:57

## 2023-06-21 RX ADMIN — AZITHROMYCIN MONOHYDRATE 250 MG: 250 TABLET ORAL at 08:02

## 2023-06-21 RX ADMIN — ATORVASTATIN CALCIUM 20 MG: 20 TABLET, FILM COATED ORAL at 08:09

## 2023-06-21 RX ADMIN — PROPOFOL 60 MCG/KG/MIN: 10 INJECTION, EMULSION INTRAVENOUS at 17:36

## 2023-06-21 RX ADMIN — PROPOFOL 45 MCG/KG/MIN: 10 INJECTION, EMULSION INTRAVENOUS at 14:01

## 2023-06-21 ASSESSMENT — ACTIVITIES OF DAILY LIVING (ADL)
ADLS_ACUITY_SCORE: 35
ADLS_ACUITY_SCORE: 24
ADLS_ACUITY_SCORE: 24
ADLS_ACUITY_SCORE: 35
ADLS_ACUITY_SCORE: 28
ADLS_ACUITY_SCORE: 32
ADLS_ACUITY_SCORE: 32
ADLS_ACUITY_SCORE: 24
ADLS_ACUITY_SCORE: 28
ADLS_ACUITY_SCORE: 24
ADLS_ACUITY_SCORE: 32
ADLS_ACUITY_SCORE: 24

## 2023-06-21 NOTE — ANESTHESIA PROCEDURE NOTES
Airway       Patient location during procedure: OR       Procedure Start/Stop Times: 6/21/2023 11:30 AM  Staff -        CRNA: Geneva Burgess APRN CRNA       Performed By: CRNA  Consent for Airway        Urgency: emergent  Indications and Patient Condition       Indications for airway management: respiratory insufficiency       Induction type:RSI       Mask difficulty assessment: 0 - not attempted    Final Airway Details       Final airway type: endotracheal airway       Successful airway: ETT - single  Endotracheal Airway Details        ETT size (mm): 7.0       Cuffed: yes       Successful intubation technique: video laryngoscopy       VL Blade Size: MAC 3       Grade View of Cords: 1       Adjucts: stylet       Position: Center       Measured from: gums/teeth       Secured at (cm): 22       Bite block used: None    Post intubation assessment        Placement verified by: capnometry, equal breath sounds and chest rise        Number of attempts at approach: 1       Number of other approaches attempted: 0       Secured with: commercial tube mishra       Ease of procedure: easy       Dentition: Intact and Unchanged    Medication(s) Administered   propofol (DIPRIVAN) injection 10 mg/mL vial - Intravenous   100 mg - 6/21/2023 11:29:00 AM  succinylcholine (ANECTINE) 20 mg/mL injection - Intravenous   100 mg - 6/21/2023 11:29:00 AM  Medication Administration Time: 6/21/2023 11:30 AM

## 2023-06-21 NOTE — PLAN OF CARE
"Goal Outcome Evaluation: declining                ICU End of Shift Summary.  For vital signs and complete assessments, please see documentation flowsheets.      Pertinent assessments:   Neuro: sedated on propofol, RASS -2,  Cardiac: Tele; SR. HX of afib  Resp: on full vent support FIO2 40%, RR 18, Peep of 5 and TV of 350, moderate secretions suctioned from ETT and oral. LS: coarse with insp and exp wheezes  GI: abdomen distended, NG to LIS, no symptoms of bleeding,. Hgb Q 6 hours  : Hines Placed, good UOP  Skin: no changes  Lines: 3 PIV  Drips: LR, Fentanyl, prop    Major Shift Events:     Patient desats this am upon transferring to bedside commode to void, MD notified, patient was given nebs without improvement. Patient kept saying \"I feel better\" even tho, patient was on tripod position, using all accessory and abdominal muscles. MD made aware    PRN hydralazine given for high BP    MD came and spoke to patient regarding her status, patient continues to decline feeling SOB when RR at 44. Patients looked super uncomfortable    1125 Writer spoke to patient about her respiratory status deckling and patient agreed to be intubated, MD Ovalles and Conner notified. Patient was intubated successfully at 1133    Propofol used for sedation    Fentanyl started due to hx of chronic pain    Zithromax changed to IV    Hines cath placed    NG placed to LIS    At 1400 RN noted black output in NG canister, patient abdomen continues to be distended, MD made aware. Protonix changed to IV. HGB ordered Q 6 hours. Eliquis placed on hold  Plan (Upcoming Events): continue IVF, iv abx, vent managent, steroids, wean as abale. folow up labs  Discharge/Transfer Needs: TBD     Bedside Shift Report Completed : yes  Bedside Safety Check Completed: yes      Right wrist and Left wrist restraints initiated on patient on 6/21/2023 at 11:30 am    Clinical Justification: Pulling lines, pulling tubes, and pulling equipment  Less Restrictive " Alternative: 1:1 patient care, Re-evaluate equipment, Disguise equipment, Pain management  Attending Physician Notified: MD ordered restraint,     Order received: Yes     Family Notification: Other   Criteria explained to Patient  Patient's Response: No evidence of learning  Restraint care Plan initiated: Yes    Jass Stauffer RN    Patient ring was removed after intubation and send to security in a valuable envelope on 6/21/2023 at 2141 pm  Envelope given to naga from Lexus adams Charge was witness      Right wrist and Left wrist restraints continued 6/21/2023    Clinical Justification: Pulling lines, pulling tubes, and pulling equipment  Less Restrictive Alternative: 1:1 patient care, Re-evaluate equipment, Disguise equipment, Pain management  Attending Physician Notified: MD ordered restraint,     New orders placed Yes     Length of Order: 1 Day      Jass Stauffer RN       Notified provider about indwelling el catheter discussed removal or continued need.    Did provider choose to remove indwelling el catheter? NO    Provider's el indication for keeping indwelling el catheter: Indication for continued use: Deep Sedation/Paralysis    Is there an order for indwelling le catheter? YES    *If there is a plan to keep el catheter in place at discharge daily notification with provider is not necessary, but please add a notation in the treatment team sticky note that the patient will be discharging with the catheter.

## 2023-06-21 NOTE — ED TRIAGE NOTES
Pt arrives via EMS from home after  reports that pt has had difficulty breathing for the past 20 hrs. EMS reports pt stated taking 4 duonebs throughout the last 20 hours. EMS reports pt lost control of bowels due to work of breathing. EMS administered 125 mg of solumedrol and 2g of Magnesium. EMS reports blood pressures in 200's and HR's in 140's. 20G in left lower arm from EMS.

## 2023-06-21 NOTE — PROGRESS NOTES
Intensivist brief update  DOS: 6/21/2023    Ms Melendez is a 56 y/o woman with severe COPD and multiple recent admissions for exacerbations who was admitted overnight with shortness of breath and acute on chronic mixed respiratory failure. She also had rapid atrial fibrillation, for which she is anticoagulated and takes diltiazem. She has been started on inhaled and IV steroids, bronchodilators, and BiPAP as well as azithromycin. Her HR converted to sinus tachycardia.     At my evaluation, she is alert and appropriate. She reports feeling a little better than when she came in. HR is 100s-110s, RR 20s, vt 500s-600s on 16/6 with sats 99% on 50% fio2. BP is hypertensive. She is using some accessory muscles to breathe. Lung fields with diffuse wheezes. Pulse is regular tachycardia, ST on the monitor. Her extremities are nonedematous and well-perfused.     Labs reviewed; her VBG shows CO2 retention with compensated pH, and is unchanged over two assessments. CXR shows hyperinflated lungs, chronic left upper lobe opacities, and no inflammatory/infectious-appearing infiltrates.    Impression/recommendations  This 56 y/o woman is admitted with acute hypoxemic and hypercarbic respiratory failure, likely from COPD exacerbation.     # Severe COPD  # COPD exacerbation  # Acute on chronic mixed respriatory failure  - On nebs, IV methylprednisolone, azithromycin  - Continue BiPAP for now, wean as able  - Don't think she needs to be intubated now; continue to follow as she has potential to deteriorate  - Agree with asking pulmonology to see her    # Paroxysmal atrial fibrillation  # Hypertension  - May be related to stress from work of breathing, has converted to sinus tachycardia  - Continue apixiban, diltiazem, losartan     Will continue to follow peripherally for now.    Jn Ovalles MD, PhD  Surgical critical care  June 21, 2023, 10:39 AM    Addendum 11:49 AM, 6/21/2023  Pt has become increasingly fatigued and indicated a  desire for intubation. This was performed by CRNA. She is now intubated and on assist control ventilation 18/450/5/50.     # Pain/sedation  # Depression, anxiety  - Added propofol, fentanyl  - Continues on paroxetine    # Acute on chronic mixed respiratory failure  # Severe COPD  # COPD exacerbation  # Tobacco dependence  # Pulmonary nodules  - Continue current therapeutic plan  - Vt reduced to 400 (8 mL/kg IBW), will request VBG in a bit  - CXR pending    # Paroxysmal atrial fibrillation  # Hypertension  # Dyslipidemia  - Will likely need to convert diltiazem into immediate-release format  - Continue losartan for now  - Continue statin  - Continue apixiban    # Nutrition  - npo for now, OG decompression with AXR pending  - /hr  - Follow I/O    # Hypothyroidism  # Stress/steroid hyperglycemia  - Levothyroxine  - On methylprednisolone, follow FSG and treat as indicated    # ICU prophylaxis  - Apixiban  - Added PPI    This patient is critically ill by my examination today and requires continued ICU cares. A total of 35 minutes critical care time, exclusive of procedures, now spent on 6/21/2023.     Jn Ovalles MD, PhD  Surgical critical care  6/21/2023, 11:59 AM

## 2023-06-21 NOTE — H&P
[facility]  Hospitalist Admission Note  Name: Lara Melendez    MRN: [id number]  YOB: 1965    Age: 57 year old  Date of admission: 6/20/2023  Primary care provider: Sudhir Carroll    Chief Complaint: Shortness of breath    Assessment and Plan:   Acute on chronic hypoxemic and hypercapnic respiratory failure  COPD with acute exacerbation: Has had increasing shortness of breath for a few days secondary to the poor air quality this week.  Has had a cough nonproductive sputum.  Started a Z-Bennett 2 days ago and prednisone 40 mg/day for 1 day then 30 mg/day for 2 days.  Increasing shortness of breath, wheezing, and chest tightness despite using nebs frequently.  PTA on Advair and DuoNebs and albuterol nebs as needed.  Has 2 L O2 at home as needed, but usually does not use.  Tachypneic and tachycardic in the ER.  Hypercapnic initially with VBG showing pH 7.24, PCO2 69, PO2 223, bicarb 29.  Repeat VBG on BiPAP is much improved.  Chest x-ray shows hyperinflated lungs without any new infiltrate.  Slight leukocytosis at 11.8, but has been on prednisone and procalcitonin 0.04.  Received 125 mg IV Solu-Medrol in route.  -IV Solu-Medrol 62.5 mg twice daily  -Given her A-fib with RVR and tachycardia I have ordered Xopenex and ipratropium nebs scheduled 4 times daily and Xopenex nebs as needed  -Finish her Z-Bennett over the next 3 days  -Continue Advair, substitute for Breo Ellipta per formulary  -Continue BiPAP until respiratory status improves then trial off, n.p.o. while needing BiPAP  -Repeat labs including VBG this morning  -IV lorazepam as needed for anxiety with BiPAP in place    Paroxysmal A-fib with RVR: History paroxysmal with A-fib with RVR and she is on apixaban and diltiazem  mg daily.  Presenting the ER with tachycardia in the 130s.  Heart rate slightly improved and she appears to be in A-fib.  Tachycardia likely exacerbated by multiple DuoNebs and her respiratory failure.  -Resume her  Eliquis and PTA p.o. diltiazem  -If worsening tachycardia in the 130-150 range may need to start a diltiazem infusion  -Cardiac monitoring    Anxiety: Resume PTA clonazepam 1 mg daily and paroxetine 20 mg daily.  While on BiPAP IV lorazepam 0.5 mg every 4 hours as needed for anxiety.    Hypothyroidism: Resume PTA levothyroxine.    HTN: PTA on losartan 25 mg daily and diltiazem  mg daily.  -Resume diltiazem, but hold losartan initially    HLD: Resume atorvastatin.    Tobacco dependence: Nicotine patch ordered.      DVT Prophylaxis: Eliquis  Code Status: Full Code  FEN: N.p.o. while on BiPAP, LR at 100 mL/h  Discharge Dispo: Home  Estimated Disch Date / # of Days until Disch: Admit inpatient to the ICU for BiPAP needs and ongoing tachypnea.  Anticipate 3-5-day hospitalization based on previous episodes.      History of Present Illness:  Lara Melendez is a 57 year old female with PMH including COPD, emphysema, as needed 2 L O2 at home, tobacco dependence, HTN, anxiety, paroxysmal A-fib on Eliquis, hypothyroidism, psoriasis, and HLD who presents with worsening shortness of breath and wheezing.  She started feeling short of breath along with some wheezing a few days ago which she thinks is due to the very poor air quality this week.  She talked her pulmonologist who said to start a Z-Bennett and she has had 2 days worth of this.  She also started prednisone 40 mg day 1 then 30 mg for the last 2 days.  Despite this she has been progressively short of breath and wheezing despite using her DuoNebs 4 times today.  She has had a nonproductive cough.  No fevers or chills.  Due to her significant shortness of breath she called EMS.  Her blood pressure was in the 200 systolic and heart rate in the 140s for EMS and she received 125 mg Solu-Medrol and 2 g IV magnesium in route.  She was placed on BiPAP in the ER and she says her shortness of breath is improved some.  She is feeling anxious due to her shortness of breath and  BiPAP.  She has been taking her Eliquis and all of her other medications including her inhalers.  Her chest feels tight due to shortness of breath, but denies any pressure or pain sensation.    History obtained from patient, medical record, and from Dr. Smatr in the emergency department.  Blood pressure initially 168/112 then down to 146/88, tachycardic at 130, temperature 97.4  F, respiratory rate currently in the mid 20s but previously was in the 40 range, oxygen 98% on BiPAP with FiO2 35%.  She received 2 DuoNebs in the ER and was placed on BiPAP.  Her respiratory rate has improved some.  Initial labs showed hypercapnia with pH 7.24, PCO2 69, PO2 223, bicarb 29.  Repeat VBG shows pH of 7.36, PCO2 49, PO2 71, bicarb 28.  She is a leukocytosis 11.8, hemoglobin 13.5, platelet count 258.  CMP within normal limits except for blood glucose 126.  Procalcitonin 0.04.  Troponin T normal at 12 and BNP is not elevated 69.  Lactic acid normal at 0.9.  Chest x-ray shows hyperinflated lungs along with some chronic left upper lobe interstitial changes, but no pneumonia.  EKG shows tachycardia at the 130 range, likely A-fib.  She will be admitted inpatient to the ICU for BiPAP needs and ongoing tachypnea.       Clinically Significant Risk Factors Present on Admission               # Drug Induced Coagulation Defect: home medication list includes an anticoagulant medication    # Hypertension: Noted on problem list   # Non-Invasive mechanical ventilation: current O2 Device: BiPAP/CPAP  # Acute hypoxic respiratory failure: continue supplemental O2 as needed                        Past Medical History reviewed:  Past Medical History:   Diagnosis Date     Anxiety      COPD (chronic obstructive pulmonary disease) - 2L home O2      Diverticulitis of colon      Hypercholesterolemia      Hypertension      Hypothyroidism      Paroxysmal atrial fibrillation      Psoriasis      Pulmonary nodule - left upper lobe      Past Surgical History  reviewed:  Past Surgical History:   Procedure Laterality Date     CHOLECYSTECTOMY       INCISION AND DRAINAGE MANDIBLE, COMBINED Left 01/10/2022    Procedure: INCISION AND DRAINAGE, MANDIBLE;  Surgeon: Jn Feliz DDS;  Location: [facility] OR     INCISION AND DRAINAGE MANDIBLE, COMBINED Left 02/04/2022    Procedure: INCISION AND DRAINAGE, MANDIBLE;  Surgeon: Taylor Ortez DDS;  Location: [facility] OR     TOOTH EXTRACTION       Vocal Cord surgery       Social History reviewed:  Social History     Tobacco Use     Smoking status: Never     Smokeless tobacco: Never   Substance Use Topics     Alcohol use: Yes     Comment: occ     Social History     Social History Narrative     Not on file     Family History reviewed:  Family History   Problem Relation Age of Onset     Deep Vein Thrombosis (DVT) Mother      Hypertension Mother      Allergies:  Allergies   Allergen Reactions     Sulfa Antibiotics      Doxycycline Other (See Comments)     Develops chest tightness  Intolerance not allergy     Penicillins Rash     Generalized rash  Rash, Generalized       Medications:  Prior to Admission medications    Medication Sig Last Dose Taking? Auth Provider Long Term End Date   albuterol (PROAIR HFA/PROVENTIL HFA/VENTOLIN HFA) 108 (90 Base) MCG/ACT inhaler Inhale 2 puffs into the lungs every 4 hours as needed for shortness of breath / dyspnea or wheezing Inhale 1-2 Puffs every 4 hours as needed for Wheezing.   Mark Olsen, DO Yes    albuterol (PROVENTIL) (2.5 MG/3ML) 0.083% neb solution Take 1 vial (2.5 mg) by nebulization every 4 hours as needed for shortness of breath or wheezing   Tommie Ren MD Yes 3/26/23   apixaban ANTICOAGULANT (ELIQUIS) 5 MG tablet Take 1 tablet (5 mg) by mouth 2 times daily   Luis Alberto Valentin MD     atorvastatin (LIPITOR) 20 MG tablet Take 20 mg by mouth daily   Reported, Patient Yes    clonazePAM (KLONOPIN) 0.5 MG tablet Take 0.5 mg by mouth daily   Unknown, Entered By History Yes    cyclobenzaprine  (FLEXERIL) 5 MG tablet Take 1 tablet (5 mg) by mouth every 8 hours as needed for muscle spasms   Luis Alberto Valentin MD     diclofenac (VOLTAREN) 1 % topical gel Apply 2 g topically 4 times daily   Luis Alberto Valentin MD     diltiazem ER COATED BEADS (CARDIZEM CD/CARTIA XT) 240 MG 24 hr capsule Take 1 capsule (240 mg) by mouth daily   Luis Alberto Valentin MD Yes    fluticasone-salmeterol (ADVAIR-HFA) 230-21 MCG/ACT inhaler Inhale 2 puffs into the lungs 2 times daily   Mark Olsen, DO Yes    ipratropium - albuterol 0.5 mg/2.5 mg/3 mL (DUONEB) 0.5-2.5 (3) MG/3ML neb solution Take 1 vial (3 mLs) by nebulization every 6 hours as needed for shortness of breath / dyspnea or wheezing   Morales Alva MD Yes    levothyroxine (SYNTHROID/LEVOTHROID) 112 MCG tablet Take 112 mcg by mouth daily   Reported, Patient Yes    losartan (COZAAR) 25 MG tablet Take 1 tablet (25 mg) by mouth daily   Kody Prieto,  Yes    nicotine (NICODERM CQ) 21 MG/24HR 24 hr patch Place 1 patch onto the skin daily   Luis Alberto Valentin MD     PARoxetine (PAXIL) 20 MG tablet Take 20 mg by mouth daily   Unknown, Entered By History No        Luis Alberto Valentin MD     tiotropium (SPIRIVA RESPIMAT) 2.5 MCG/ACT inhaler Inhale 2 puffs into the lungs daily as needed   Unknown, Entered By History No    Z-Bennett last 2 days  Prednisone last 3 days      Review of Systems:  A Comprehensive greater than 10 system review of systems was carried out.  Pertinent positives and negatives are noted above.  Otherwise negative.     Physical Exam:  Blood pressure (!) 153/93, pulse (!) 124, temperature 97.4  F (36.3  C), temperature source Axillary, resp. rate 26, SpO2 97 %.  Wt Readings from Last 1 Encounters:   04/01/23 68.8 kg (151 lb 9.6 oz)     Exam:  Constitutional: Awake, appears in mild respiratory distress  Eyes: sclera white   HEENT: atraumatic, MMM  Respiratory: Mild tachypnea on BiPAP, diffuse expiratory wheeze, able to speak in few word  sentences  Cardiovascular: Irregularly, regular rhythm, tachycardic, no murmur appreciated  GI: non-tender, not distended, bowel sounds present  Skin: Varicose veins lower legs  Musculoskeletal/extremities: atraumatic, no major deformities.  Trace bilateral lower extreme edema  Neurologic: A&O, speech clear, able to speak a few words at a time, follows commands  Psychiatric: Anxious, but cooperative    Lab and imaging data personally reviewed:  Labs:  Recent Labs   Lab 06/21/23  0102 06/21/23  0003   PHV 7.36 7.24*   PO2V 71* 223*   PCO2V 49 69*   HCO3V 28 29*     Recent Labs   Lab 06/21/23  0003   WBC 11.8*   HGB 13.5   HCT 40.5   *        Recent Labs   Lab 06/21/23  0003 06/21/23  0001     --    POTASSIUM 5.3  --    CHLORIDE 99  --    CO2 28  --    ANIONGAP 11  --    * 130*   BUN 7.9*  --    CR 0.63  --    GFRESTIMATED >90  --    EDIN 8.9  --    MAG 2.5*  --    PROTTOTAL 7.5  --    ALBUMIN 4.5  --    BILITOTAL 0.2  --    ALKPHOS 76  --    AST 34  --    ALT 44  --      Recent Labs   Lab 06/21/23  0003   NTBNPI 69     Recent Labs   Lab 06/21/23  0003   INR 1.04     Recent Labs   Lab 06/21/23  0003   LACT 0.9     Procalcitonin 0.04  Troponin T 12  COVID-19, influenza A/B, RSV PCR negative    EKG: A-fib with RVR    Imaging:  Recent Results (from the past 24 hour(s))   XR Chest Port 1 View    Narrative    EXAM: XR CHEST PORT 1 VIEW  LOCATION: [facility]  DATE: 6/21/2023    INDICATION: COPD  COMPARISON: 03/20/2023      Impression    IMPRESSION: Stable chest. Again seen is hyperinflation of both lungs. Chronic interstitial increased density in the upper left chest may represent aspirated barium or changes of calcification from old inflammation. Partially calcified thoracic aorta. No   inflammatory infiltrates or active CHF.       Albin Navarro MD  Hospitalist  [facility]

## 2023-06-21 NOTE — PROGRESS NOTES
A BiPAP of  16/6 @ 25% was applied to the pt via the mask for an increase in WOB and/or SOB. The bridge of the nose looks good and remains intact. Duoneb treatment given via inline. Pt is tolerating it well. Will continue to monitor and assess the pt's current respiratory status and needs.    Venous Blood Gas  Recent Labs   Lab 06/21/23  0003   PHV 7.24*   PCO2V 69*   PO2V 223*   HCO3V 29*   SUSANNAH 0.2   O2PER 50         BP (!) 161/46   Pulse (!) 134   Temp 97.4  F (36.3  C) (Axillary)   Resp 26   SpO2 97%       Ulices Escalante, RT

## 2023-06-21 NOTE — ANESTHESIA CARE TRANSFER NOTE
Patient: Lara Melendez    Procedure: * No procedures listed *       Diagnosis: * No pre-op diagnosis entered *  Diagnosis Additional Information: No value filed.    Anesthesia Type:   General     Note:    Oropharynx: endotracheal tube in place and ventilatory support  Level of Consciousness: iatrogenic sedation      Independent Airway: airway patency not satisfactory and stable  Dentition: dentition unchanged  Vital Signs Stable: post-procedure vital signs reviewed and stable  Report to RN Given: handoff report given  Patient transferred to: ICU  Comments: Pt intubated for respiratory insufficiency.  Propofol gtt started immediately following intubation for sedation.  After tracheal intubation confirmed with BBS, color change and equal chest rise, RT placed pt on ventilator.  VSS.  Report to RN with no questions/concerns at this time.  ICU Handoff: Call for PAUSE to initiate/utilize ICU HANDOFF, Identified Patient, Identified Responsible Provider, Reviewed the Pertinent Medical History, Discussed Surgical Course, Reviewed Intra-OP Anesthesia Management and Issues during Anesthesia, Set Expectations for Post Procedure Period and Allowed Opportunity for Questions and Acknowledgement of Understanding      Vitals:  Vitals Value Taken Time   /115 06/21/23 1145   Temp     Pulse 141 06/21/23 1147   Resp 23 06/21/23 1147   SpO2 98 % 06/21/23 1147   Vitals shown include unvalidated device data.    Electronically Signed By: ZACHARY Frank CRNA  June 21, 2023  11:48 AM

## 2023-06-21 NOTE — ED PROVIDER NOTES
History     Chief Complaint:  Shortness of Breath     History limited due to patient's severe shortness of breath and need for BiPAP ventilation    HPI   Lara Melendez is a 57 year old female with history of COPD, anxiety, hypertension, and atrial fibrillation who presents via EMS with shortness of breath. EMS reports that patient has had more difficulty breathing for the past 20 hours and believes that recent poor air quality has triggered her COPD. EMS placed the patient on BiPAP and gave her a duo neb, solumedrol 125mg, and magnesium 2g. They report that during a prior COPD exacerbation she had to be admitted and placed on BiPAP for multiple days. Her blood pressure was 200 systolic, heart rate 140, and sugar of 130 with EMS. Here in the ED she denies fever and use of blood thinners or tobacco. Patient is taken prednisone currently and is occasionally on 2 liters of oxygen at home.     Independent Historian:   EMS - They report that the patient has had worse shortness of breath for the past 20 hours.    Review of External Notes:    I reviewed 4/1/23 discharge summary.    Medications:    Albuterol  Lipitor  eliquis  Clonazepam  Diltiazem  Advair  Synthroid   Duo neb  Cozaar   Paxil  Prednisone   Spiriva     Past Medical History:    Anxiety   COPD (chronic obstructive pulmonary disease) - 2L home O2   Diverticulitis of colon   Hypercholesterolemia   Hypertension   Hypothyroidism   Paroxysmal atrial fibrillation   Psoriasis   Pulmonary nodule     Past Surgical History:    Cholecystectomy   Incision and drainage mandible  Vocal cord surgery      Physical Exam     Patient Vitals for the past 24 hrs:   BP Temp Temp src Pulse Resp SpO2   06/21/23 0130  153/93 -- --  124 -- 97 %   06/21/23 0100  146/96 -- --  137 -- 96 %   06/21/23 0020  159/52 -- --  135 -- 97 %   06/21/23 0016  161/46 -- --  134 -- 97 %   06/21/23 0008  168/112 -- --  135 -- 98 %   06/21/23 0000 -- 97.4  F (36.3  C) Axillary  133 -- 98 %       Physical Exam  Nursing note and vitals reviewed.  Constitutional: Patient is on Bipap.  HENT:   Mouth/Throat: Mucous membranes are dry  Cardiovascular: Tachycardic rate, irreguarlly irregular rhythm.  Exam limited due to need for BiPAP ventilation  Pulmonary/Chest: Tachypnic. Markedly increased work of breathing. Poor air movement in all lung fields   Abdominal: Soft. Normal appearance. There is no tenderness.  Musculoskeletal: No edema in legs.  Neurological: Alert. Oriented x3  Skin: Skin is warm and dry.  Psychiatric: Anxious appearing    Emergency Department Course   ECG  ECG results from 06/20/23   EKG 12-lead, tracing only     Value    Systolic Blood Pressure     Diastolic Blood Pressure     Ventricular Rate 136    Atrial Rate     ID Interval     QRS Duration 64        QTc 445    P Axis     R AXIS 67    T Axis -20    Interpretation ECG      Supraventricular tachycardia  Nonspecific ST and T wave abnormality      Imaging:  XR Chest Port 1 View   Final Result   IMPRESSION: Stable chest. Again seen is hyperinflation of both lungs. Chronic interstitial increased density in the upper left chest may represent aspirated barium or changes of calcification from old inflammation. Partially calcified thoracic aorta. No    inflammatory infiltrates or active CHF.       Report per radiology    Laboratory:  Labs Ordered and Resulted from Time of ED Arrival to Time of ED Departure   COMPREHENSIVE METABOLIC PANEL - Abnormal       Result Value    Sodium 138      Potassium 5.3      Chloride 99      Carbon Dioxide (CO2) 28      Anion Gap 11      Urea Nitrogen 7.9 (*)     Creatinine 0.63      Calcium 8.9      Glucose 126 (*)     Alkaline Phosphatase 76      AST 34      ALT 44      Protein Total 7.5      Albumin 4.5      Bilirubin Total 0.2      GFR Estimate >90     MAGNESIUM - Abnormal    Magnesium 2.5 (*)    BLOOD GAS VENOUS - Abnormal    pH Venous 7.24 (*)     pCO2 Venous 69 (*)     pO2 Venous 223 (*)     Bicarbonate  Venous 29 (*)     Base Excess/Deficit (+/-) 0.2      FIO2 50     CBC WITH PLATELETS AND DIFFERENTIAL - Abnormal    WBC Count 11.8 (*)     RBC Count 3.99      Hemoglobin 13.5      Hematocrit 40.5       (*)     MCH 33.8 (*)     MCHC 33.3      RDW 12.9      Platelet Count 258      % Neutrophils 75      % Lymphocytes 15      % Monocytes 8      % Eosinophils 1      % Basophils 0      % Immature Granulocytes 1      NRBCs per 100 WBC 0      Absolute Neutrophils 8.9 (*)     Absolute Lymphocytes 1.7      Absolute Monocytes 1.0      Absolute Eosinophils 0.1      Absolute Basophils 0.0      Absolute Immature Granulocytes 0.1      Absolute NRBCs 0.0     GLUCOSE BY METER - Abnormal    GLUCOSE BY METER POCT 130 (*)    BLOOD GAS VENOUS - Abnormal    pH Venous 7.36      pCO2 Venous 49      pO2 Venous 71 (*)     Bicarbonate Venous 28      Base Excess/Deficit (+/-) 1.6      FIO2 25     INR - Normal    INR 1.04     LACTIC ACID WHOLE BLOOD - Normal    Lactic Acid 0.9     TROPONIN T, HIGH SENSITIVITY - Normal    Troponin T, High Sensitivity 12     NT PROBNP INPATIENT - Normal    N terminal Pro BNP Inpatient 69     INFLUENZA A/B, RSV, & SARS-COV2 PCR - Normal    Influenza A PCR Negative      Influenza B PCR Negative      RSV PCR Negative      SARS CoV2 PCR Negative     PROCALCITONIN - Normal    Procalcitonin 0.04        Interventions:  Medications   ipratropium - albuterol 0.5 mg/2.5 mg/3 mL (DUONEB) neb solution 3 mL (3 mLs Nebulization $Given 6/21/23 0018)      ED course:  2356 Patient arrives. History collected from EMS.  Patient transitioned to our BiPAP and DuoNeb initiated  0201 Blood drawn  0202 EKG taken.   0204 New IV placed.   0230    on recheck, patient resting much more comfortably.  Work of breathing improved    Independent Interpretation (X-rays, CTs, rhythm strip):  I independently reviewed the chest x-ray.  No infiltrate noted.  Hyper aeration noted    Consultations/Discussion of Management or Tests:  0128 I spoke  with Albin Gordon MD of the Hospitalist service from [facility] regarding patient's presentation, findings, and plan of care.      Social Determinants of Health affecting care:   None    Disposition:  The patient was admitted to the tensive care unit under the care of Albin Gordon MD.     Impression & Plan      Medical Decision Makin-year-old female on occasional oxygen for severe chronic COPD who presents in respiratory distress.  She was aggressively managed prehospital with IV Solu-Medrol, magnesium and DuoNeb administration as well as BiPAP ventilation.  We have continued the BiPAP ventilation and with encouragement to focus on her expiration time her work of breathing is now markedly improved.  Initial venous blood gas showed a respiratory acidosis which is correcting.  No evidence of pneumonia.  She is chronically anticoagulated making pulmonary embolism unlikely.  At this time she will be admitted to the ICU for continued respiratory cares.    Critical care time: Totaled 30 minutes exclusive of procedures    Diagnosis:    ICD-10-CM    1. Acute and chronic respiratory failure with hypercapnia (H)  J96.22       2. COPD exacerbation (H)  J44.1       3. Paroxysmal atrial fibrillation (H)  I48.0       4. Anticoagulated  Z79.01            Dashawn Srinivasan  2023   Kody Smart MD Amdahl, John, MD  [phone]

## 2023-06-21 NOTE — PLAN OF CARE
Neuro:Alert, oriented, VSS  Cardiac: tele -120's, BP slightly elevated  Pulm:LS diminished, exp wh, on BIPAP, BLUE  GI:incontinent of stool at home, BS present  :stress incontinence, BSC  ID:afebrile  Skin:intact  Access:B PIV    Plan: monitor respiratory status, give steroids, monitor BG

## 2023-06-21 NOTE — CONSULTS
Pulmonary Consult  Lara Melendez MRN: [id number]  1965  Date of Admission:6/20/2023  Primary care provider: Sudhir Carroll  ___________________________________    Lara Melendez MRN# [id number]   YOB: 1965 Age: 57 year old   Date of Admission: 6/20/2023              Assessment and Recommendations:     ## COPD with exacerbation  She sees a pulmonologist at [facility], unfortunately I do not have access to her PFTs but per pulmonary notes her FEV1 is 51% with a DLCO of 60%.  From the chart it sounds like she has had frequent exacerbations.  Her home inhalers are Breo and it sounds like her primary pulmonologist recently tried to add a LAMA.  Breo is likely not the best option for her as it is a dry powder inhaler and she may not have the ability to fully inhale this.  I would recommend changing to aerosolized inhaler such as Breztri.  The RT COPD team can assist with this following extubation.  She requires 3 L supplemental oxygen at baseline which equates to an FiO2 of approximately 35%.  She is currently requiring 40% so is somewhat near her baseline.    -Recommend follow-up ABG to assess CO2 clearance on current ventilator settings  -Agree with completing azithromycin  -Agree with current steroid dose but consider rapid down titration if she improves    -Air trapping on the vent, reduced inspiratory time and Tv    -Recommend [facility] COPD team consult following extubation  -Recommend changing home inhaler regimen from dry powder inhalers to aerosol (per outpatient pulmonary notes Spiriva has not been helpful in the past)  -Given the frequency of her exacerbations the addition of an antiexacerbation medication such as Roflumilast or chronic azithromycin may be warranted at discharge      Kain Bush M.D.  Pulmonary & Critical Care  Pager: Click Here to page    Pulmonary will continue to follow. We are in house at [facility] on Monday,  Wednesday, and Friday. For assistance on other days, please page the on-call pulmonologist through [facility] or the .      I spent 60 minutes dedicated to his care so far today excluding procedures, including review of medical records, review of imaging (results & images), time with patient and time in documentation.           HPI:     Lara Melendez is a 57 year old female with HO COPD/emphysema with home oxygen (3 L), tobacco dependence, hypertension, anxiety, A-fib on Eliquis, hypothyroidism, psoriasis, and HLD admitted 6/20/2023 for worsening dyspnea with wheezing.    I am unable to interview the patient due to intubation.  Per the chart she presented after a few days of worsening dyspnea and wheezing.  She was started on a Z-Bennett and prednisone taper, however this was ineffective in improving her symptoms.    She was initially treated with BiPAP, bronchodilators, and steroids, however she continued to decline and was ultimately intubated.           Past Medical History:     Past Medical History:   Diagnosis Date     Anxiety      COPD (chronic obstructive pulmonary disease) - 2L home O2      Diverticulitis of colon      Hypercholesterolemia      Hypertension      Hypothyroidism      Paroxysmal atrial fibrillation      Psoriasis      Pulmonary nodule - left upper lobe      =         Past Surgical History:      Past Surgical History:   Procedure Laterality Date     CHOLECYSTECTOMY       INCISION AND DRAINAGE MANDIBLE, COMBINED Left 01/10/2022    Procedure: INCISION AND DRAINAGE, MANDIBLE;  Surgeon: Jn Feliz DDS;  Location: [facility] OR     INCISION AND DRAINAGE MANDIBLE, COMBINED Left 02/04/2022    Procedure: INCISION AND DRAINAGE, MANDIBLE;  Surgeon: Taylor Ortez DDS;  Location: [facility] OR     TOOTH EXTRACTION       Vocal Cord surgery                Social History:     Social History     Socioeconomic History     Marital status:      Spouse name: Not on file     Number of children: Not on file      Years of education: Not on file     Highest education level: Not on file   Occupational History     Not on file   Tobacco Use     Smoking status: Never     Smokeless tobacco: Never   Substance and Sexual Activity     Alcohol use: Yes     Comment: occ     Drug use: Never     Sexual activity: Not Currently   Other Topics Concern     Not on file   Social History Narrative     Not on file     Social Determinants of Health     Financial Resource Strain: Not on file   Food Insecurity: Not on file   Transportation Needs: Not on file   Physical Activity: Not on file   Stress: Not on file   Social Connections: Not on file   Intimate Partner Violence: Not on file   Housing Stability: Not on file               Family History:     Family History   Problem Relation Age of Onset     Deep Vein Thrombosis (DVT) Mother      Hypertension Mother               Immunizations:     Immunization History   Administered Date(s) Administered     COVID-19 Monovalent 18+ (Moderna) 04/29/2021, 05/27/2021              Allergies:     Allergies   Allergen Reactions     Sulfa Antibiotics      Doxycycline Other (See Comments)     Develops chest tightness  Intolerance not allergy     Penicillins Rash     Generalized rash  Rash, Generalized                  Medications:     Current Facility-Administered Medications   Medication     [Held by provider] apixaban ANTICOAGULANT (ELIQUIS) tablet 5 mg     atorvastatin (LIPITOR) tablet 20 mg     azithromycin 500 mg (ZITHROMAX) in 0.9% NaCl 250 mL intermittent infusion 500 mg     [Held by provider] clonazePAM (klonoPIN) tablet 0.5 mg     cyclobenzaprine (FLEXERIL) tablet 5 mg     glucose gel 15-30 g    Or     dextrose 50 % injection 25-50 mL    Or     glucagon injection 1 mg     diltiazem ER COATED BEADS (CARDIZEM CD/CARTIA XT) 24 hr capsule 240 mg     fentaNYL (SUBLIMAZE) 50 mcg/mL bolus from pump     fentaNYL (SUBLIMAZE) infusion     fluticasone-vilanterol (BREO ELLIPTA) 200-25 MCG/ACT inhaler 1 puff      "hydrALAZINE (APRESOLINE) injection 10 mg     insulin aspart (NovoLOG) injection (RAPID ACTING)     ipratropium (ATROVENT) 0.02 % neb solution 0.5 mg     lactated ringers infusion     levalbuterol (XOPENEX) neb solution 0.63 mg     levalbuterol (XOPENEX) neb solution 0.63 mg     levothyroxine (SYNTHROID/LEVOTHROID) tablet 112 mcg     LORazepam (ATIVAN) injection 0.5 mg     losartan (COZAAR) tablet 25 mg     propofol (DIPRIVAN) infusion    And     propofol (DIPRIVAN) bolus from infusion pump 10 mg    And     Medication Instruction     methylPREDNISolone sodium succinate (solu-MEDROL) injection 62.5 mg     midazolam (VERSED) 1 MG/ML injection     naloxone (NARCAN) injection 0.2 mg    Or     naloxone (NARCAN) injection 0.4 mg    Or     naloxone (NARCAN) injection 0.2 mg    Or     naloxone (NARCAN) injection 0.4 mg     nicotine (NICODERM CQ) 14 MG/24HR 24 hr patch 1 patch     nicotine Patch in Place     ondansetron (ZOFRAN ODT) ODT tab 4 mg    Or     ondansetron (ZOFRAN) injection 4 mg     pantoprazole (PROTONIX) IV push injection 40 mg     PARoxetine (PAXIL) tablet 20 mg     Patient is already receiving anticoagulation with heparin, enoxaparin (LOVENOX), warfarin (COUMADIN)  or other anticoagulant medication     polyethylene glycol (MIRALAX) Packet 17 g     prochlorperazine (COMPAZINE) injection 10 mg    Or     prochlorperazine (COMPAZINE) tablet 10 mg    Or     prochlorperazine (COMPAZINE) suppository 25 mg     senna-docusate (SENOKOT-S/PERICOLACE) 8.6-50 MG per tablet 1 tablet    Or     senna-docusate (SENOKOT-S/PERICOLACE) 8.6-50 MG per tablet 2 tablet     sodium chloride (PF) 0.9% PF flush 3 mL     sodium chloride (PF) 0.9% PF flush 3 mL               Review of Systems:     Review of Systems   Unable to perform ROS: Intubated              Exam:   /67   Pulse 93   Temp 98.9  F (37.2  C) (Oral)   Resp 18   Ht 1.6 m (5' 3\")   Wt 64.5 kg (142 lb 3.2 oz)   SpO2 98%   BMI 25.19 kg/m      Vitals:    06/21/23 " 0304   Weight: 64.5 kg (142 lb 3.2 oz)         Physical Exam  Vitals and nursing note reviewed.   Constitutional:       Comments: Sedated, intubated   Cardiovascular:      Rate and Rhythm: Regular rhythm. Tachycardia present.   Pulmonary:      Comments: Prominent insp and exp wheezes, prolonged exp phase               Data:   ROUTINE ICU LABS (Last four results)  CMP  Recent Labs   Lab 06/21/23  1211 06/21/23  0820 06/21/23  0622 06/21/23  0338 06/21/23  0007 06/21/23  0003   NA  --   --  139  --  139 138   POTASSIUM  --   --  4.4  --  5.1 5.3   CHLORIDE  --   --  100  --  99 99   CO2  --   --  26  --   --  28   ANIONGAP  --   --  13  --   --  11   * 139* 151* 142* 126* 126*   BUN  --   --  8.8  --  7 7.9*   CR  --   --  0.62  --  0.6 0.63   GFRESTIMATED  --   --  >90  --   --  >90   EDIN  --   --  9.1  --   --  8.9   MAG  --   --  2.1  --   --  2.5*   PROTTOTAL  --   --   --   --   --  7.5   ALBUMIN  --   --   --   --   --  4.5   BILITOTAL  --   --   --   --   --  0.2   ALKPHOS  --   --   --   --   --  76   AST  --   --   --   --   --  34   ALT  --   --   --   --   --  44     CBC  Recent Labs   Lab 06/21/23 0622 06/21/23 0007 06/21/23  0003   WBC 8.5  --  11.8*   RBC 3.83  --  3.99   HGB 12.5 13.9 13.5   HCT 38.6 41 40.5   *  --  102*   MCH 32.6  --  33.8*   MCHC 32.4  --  33.3   RDW 12.9  --  12.9     --  258     INFLAMMATIONNo lab results found in last 7 days.    Invalid input(s):  ESR    INR  Recent Labs   Lab 06/21/23  0003   INR 1.04     Arterial Blood Gas  Recent Labs   Lab 06/21/23  1159 06/21/23 0622 06/21/23  0102 06/21/23  0003   O2PER 40 25 25 50       ALL CULTURESNo results for input(s): CULT in the last 168 hours.          The above note was dictated using voice recognition software and may include typographical errors. Please contact the author for any clarifications.

## 2023-06-21 NOTE — PROGRESS NOTES
HOSPITALIST FOLLOW UP NOTE:    The patient was seen and examined, I agree with the history, exam, assessment and plan of Dr Navarro.    Kody Prieto DO MPH  [facility] Hospitalist  [address] [place] [address]  Pager: [phone]  06/21/2023

## 2023-06-21 NOTE — PROGRESS NOTES
RT Note:    Patient intubated by CRNA with 7.0 ETT, secured 22cm at teeth. Placement confirmed with positive color change on ETCO2 detector and bilateral breath sounds. Placed on ventilator with settings of:    Vent Mode: CMV/AC  (Continuous Mandatory Ventilation/ Assist Control)  FiO2 (%): 30 %  Resp Rate (Set): 18 breaths/min  Tidal Volume (Set, mL): 400 mL  PEEP (cm H2O): 5 cmH2O  Resp: 16    Update, 17:15: ETT advanced 3cm to 25cm.

## 2023-06-22 LAB
ALLEN'S TEST: NO
ALLEN'S TEST: YES
ANION GAP SERPL CALCULATED.3IONS-SCNC: 12 MMOL/L (ref 7–15)
BASE EXCESS BLDA CALC-SCNC: 1.5 MMOL/L (ref -9–1.8)
BASE EXCESS BLDA CALC-SCNC: 2.4 MMOL/L (ref -9–1.8)
BASE EXCESS BLDV CALC-SCNC: -1.1 MMOL/L (ref -7.7–1.9)
BASE EXCESS BLDV CALC-SCNC: 2 MMOL/L (ref -7.7–1.9)
BUN SERPL-MCNC: 14.1 MG/DL (ref 8–23)
CALCIUM SERPL-MCNC: 9.1 MG/DL (ref 8.6–10)
CHLORIDE SERPL-SCNC: 100 MMOL/L (ref 98–107)
CREAT SERPL-MCNC: 0.82 MG/DL (ref 0.51–0.95)
DEPRECATED HCO3 PLAS-SCNC: 28 MMOL/L (ref 22–29)
ERYTHROCYTE [DISTWIDTH] IN BLOOD BY AUTOMATED COUNT: 13.1 % (ref 10–15)
GFR SERPL CREATININE-BSD FRML MDRD: 82 ML/MIN/1.73M2
GLUCOSE BLDC GLUCOMTR-MCNC: 168 MG/DL (ref 70–99)
GLUCOSE BLDC GLUCOMTR-MCNC: 170 MG/DL (ref 70–99)
GLUCOSE BLDC GLUCOMTR-MCNC: 176 MG/DL (ref 70–99)
GLUCOSE BLDC GLUCOMTR-MCNC: 177 MG/DL (ref 70–99)
GLUCOSE BLDC GLUCOMTR-MCNC: 202 MG/DL (ref 70–99)
GLUCOSE BLDC GLUCOMTR-MCNC: 203 MG/DL (ref 70–99)
GLUCOSE SERPL-MCNC: 204 MG/DL (ref 70–99)
HCO3 BLD-SCNC: 30 MMOL/L (ref 21–28)
HCO3 BLD-SCNC: 32 MMOL/L (ref 21–28)
HCO3 BLDV-SCNC: 30 MMOL/L (ref 21–28)
HCO3 BLDV-SCNC: 31 MMOL/L (ref 21–28)
HCT VFR BLD AUTO: 36.4 % (ref 35–47)
HGB BLD-MCNC: 11.2 G/DL (ref 11.7–15.7)
HGB BLD-MCNC: 11.4 G/DL (ref 11.7–15.7)
HGB BLD-MCNC: 11.9 G/DL (ref 11.7–15.7)
HGB BLD-MCNC: 11.9 G/DL (ref 11.7–15.7)
LACTATE SERPL-SCNC: 3.1 MMOL/L (ref 0.7–2)
LACTATE SERPL-SCNC: 4 MMOL/L (ref 0.7–2)
MAGNESIUM SERPL-MCNC: 2.4 MG/DL (ref 1.7–2.3)
MCH RBC QN AUTO: 33.5 PG (ref 26.5–33)
MCHC RBC AUTO-ENTMCNC: 32.7 G/DL (ref 31.5–36.5)
MCV RBC AUTO: 103 FL (ref 78–100)
O2/TOTAL GAS SETTING VFR VENT: 40 %
O2/TOTAL GAS SETTING VFR VENT: 40 %
O2/TOTAL GAS SETTING VFR VENT: 50 %
O2/TOTAL GAS SETTING VFR VENT: 50 %
OXYHGB MFR BLD: 95 % (ref 92–100)
PCO2 BLD: 57 MM HG (ref 35–45)
PCO2 BLD: 81 MM HG (ref 35–45)
PCO2 BLDV: 69 MM HG (ref 40–50)
PCO2 BLDV: 83 MM HG (ref 40–50)
PH BLD: 7.2 [PH] (ref 7.35–7.45)
PH BLD: 7.33 [PH] (ref 7.35–7.45)
PH BLDV: 7.16 [PH] (ref 7.32–7.43)
PH BLDV: 7.26 [PH] (ref 7.32–7.43)
PHOSPHATE SERPL-MCNC: 5.4 MG/DL (ref 2.5–4.5)
PHOSPHATE SERPL-MCNC: 5.9 MG/DL (ref 2.5–4.5)
PLATELET # BLD AUTO: 185 10E3/UL (ref 150–450)
PO2 BLD: 189 MM HG (ref 80–105)
PO2 BLD: 95 MM HG (ref 80–105)
PO2 BLDV: 59 MM HG (ref 25–47)
PO2 BLDV: 62 MM HG (ref 25–47)
POTASSIUM SERPL-SCNC: 4 MMOL/L (ref 3.4–5.3)
POTASSIUM SERPL-SCNC: 4 MMOL/L (ref 3.4–5.3)
RBC # BLD AUTO: 3.55 10E6/UL (ref 3.8–5.2)
SODIUM SERPL-SCNC: 140 MMOL/L (ref 136–145)
WBC # BLD AUTO: 11.7 10E3/UL (ref 4–11)

## 2023-06-22 PROCEDURE — 82803 BLOOD GASES ANY COMBINATION: CPT | Performed by: INTERNAL MEDICINE

## 2023-06-22 PROCEDURE — 94640 AIRWAY INHALATION TREATMENT: CPT

## 2023-06-22 PROCEDURE — 99291 CRITICAL CARE FIRST HOUR: CPT | Performed by: SURGERY

## 2023-06-22 PROCEDURE — 258N000003 HC RX IP 258 OP 636: Performed by: INTERNAL MEDICINE

## 2023-06-22 PROCEDURE — 36415 COLL VENOUS BLD VENIPUNCTURE: CPT | Performed by: INTERNAL MEDICINE

## 2023-06-22 PROCEDURE — C9113 INJ PANTOPRAZOLE SODIUM, VIA: HCPCS | Mod: JZ | Performed by: HOSPITALIST

## 2023-06-22 PROCEDURE — 36600 WITHDRAWAL OF ARTERIAL BLOOD: CPT

## 2023-06-22 PROCEDURE — 250N000009 HC RX 250: Performed by: INTERNAL MEDICINE

## 2023-06-22 PROCEDURE — 272N000026 HC KIT POWER PICC TRIPLE LUMEN

## 2023-06-22 PROCEDURE — 94644 CONT INHLJ TX 1ST HOUR: CPT

## 2023-06-22 PROCEDURE — 250N000011 HC RX IP 250 OP 636: Performed by: SURGERY

## 2023-06-22 PROCEDURE — 80048 BASIC METABOLIC PNL TOTAL CA: CPT | Performed by: INTERNAL MEDICINE

## 2023-06-22 PROCEDURE — 250N000013 HC RX MED GY IP 250 OP 250 PS 637: Performed by: INTERNAL MEDICINE

## 2023-06-22 PROCEDURE — 99232 SBSQ HOSP IP/OBS MODERATE 35: CPT | Performed by: HOSPITALIST

## 2023-06-22 PROCEDURE — 83605 ASSAY OF LACTIC ACID: CPT | Performed by: HOSPITALIST

## 2023-06-22 PROCEDURE — 82803 BLOOD GASES ANY COMBINATION: CPT | Performed by: SURGERY

## 2023-06-22 PROCEDURE — 200N000001 HC R&B ICU

## 2023-06-22 PROCEDURE — 85018 HEMOGLOBIN: CPT | Performed by: HOSPITALIST

## 2023-06-22 PROCEDURE — 999N000157 HC STATISTIC RCP TIME EA 10 MIN

## 2023-06-22 PROCEDURE — 250N000011 HC RX IP 250 OP 636: Mod: JZ | Performed by: HOSPITALIST

## 2023-06-22 PROCEDURE — 36569 INSJ PICC 5 YR+ W/O IMAGING: CPT

## 2023-06-22 PROCEDURE — 250N000011 HC RX IP 250 OP 636: Mod: JZ | Performed by: INTERNAL MEDICINE

## 2023-06-22 PROCEDURE — 82803 BLOOD GASES ANY COMBINATION: CPT | Performed by: HOSPITALIST

## 2023-06-22 PROCEDURE — 250N000013 HC RX MED GY IP 250 OP 250 PS 637: Performed by: HOSPITALIST

## 2023-06-22 PROCEDURE — 94640 AIRWAY INHALATION TREATMENT: CPT | Mod: 76

## 2023-06-22 PROCEDURE — 82805 BLOOD GASES W/O2 SATURATION: CPT | Performed by: SURGERY

## 2023-06-22 PROCEDURE — 85027 COMPLETE CBC AUTOMATED: CPT | Performed by: INTERNAL MEDICINE

## 2023-06-22 PROCEDURE — 94003 VENT MGMT INPAT SUBQ DAY: CPT

## 2023-06-22 PROCEDURE — 999N000105 HC STATISTIC NO DOCUMENTATION TO SUPPORT CHARGE

## 2023-06-22 PROCEDURE — 258N000003 HC RX IP 258 OP 636: Performed by: HOSPITALIST

## 2023-06-22 PROCEDURE — 84100 ASSAY OF PHOSPHORUS: CPT | Performed by: SURGERY

## 2023-06-22 PROCEDURE — 84132 ASSAY OF SERUM POTASSIUM: CPT | Performed by: SURGERY

## 2023-06-22 PROCEDURE — 83735 ASSAY OF MAGNESIUM: CPT | Performed by: SURGERY

## 2023-06-22 PROCEDURE — 258N000003 HC RX IP 258 OP 636: Performed by: SURGERY

## 2023-06-22 PROCEDURE — 94645 CONT INHLJ TX EACH ADDL HOUR: CPT

## 2023-06-22 PROCEDURE — 250N000009 HC RX 250: Performed by: SURGERY

## 2023-06-22 RX ORDER — LIDOCAINE 40 MG/G
CREAM TOPICAL
Status: ACTIVE | OUTPATIENT
Start: 2023-06-22 | End: 2023-06-25

## 2023-06-22 RX ORDER — METHYLPREDNISOLONE SODIUM SUCCINATE 125 MG/2ML
62.5 INJECTION, POWDER, LYOPHILIZED, FOR SOLUTION INTRAMUSCULAR; INTRAVENOUS ONCE
Status: COMPLETED | OUTPATIENT
Start: 2023-06-22 | End: 2023-06-22

## 2023-06-22 RX ORDER — METHYLPREDNISOLONE SODIUM SUCCINATE 125 MG/2ML
125 INJECTION, POWDER, LYOPHILIZED, FOR SOLUTION INTRAMUSCULAR; INTRAVENOUS 2 TIMES DAILY
Status: DISCONTINUED | OUTPATIENT
Start: 2023-06-22 | End: 2023-06-26

## 2023-06-22 RX ORDER — ROPIVACAINE IN 0.9% SOD CHL/PF 0.1 %
.03-.125 PLASTIC BAG, INJECTION (ML) EPIDURAL CONTINUOUS
Status: DISCONTINUED | OUTPATIENT
Start: 2023-06-22 | End: 2023-06-23

## 2023-06-22 RX ORDER — GUAIFENESIN 600 MG/1
15 TABLET, EXTENDED RELEASE ORAL DAILY
Status: DISCONTINUED | OUTPATIENT
Start: 2023-06-22 | End: 2023-07-20

## 2023-06-22 RX ORDER — ALBUTEROL SULFATE 5 MG/ML
10 SOLUTION RESPIRATORY (INHALATION) CONTINUOUS
Status: DISCONTINUED | OUTPATIENT
Start: 2023-06-22 | End: 2023-06-22

## 2023-06-22 RX ORDER — DILTIAZEM HYDROCHLORIDE 30 MG/1
60 TABLET, FILM COATED ORAL EVERY 6 HOURS SCHEDULED
Status: DISCONTINUED | OUTPATIENT
Start: 2023-06-22 | End: 2023-06-28

## 2023-06-22 RX ORDER — DILTIAZEM HYDROCHLORIDE 30 MG/1
60 TABLET, FILM COATED ORAL EVERY 6 HOURS SCHEDULED
Status: DISCONTINUED | OUTPATIENT
Start: 2023-06-22 | End: 2023-06-22

## 2023-06-22 RX ORDER — ALBUTEROL SULFATE 0.83 MG/ML
15 SOLUTION RESPIRATORY (INHALATION) ONCE
Status: COMPLETED | OUTPATIENT
Start: 2023-06-22 | End: 2023-06-22

## 2023-06-22 RX ADMIN — MINERAL OIL AND WHITE PETROLATUM: 150; 830 OINTMENT OPHTHALMIC at 23:47

## 2023-06-22 RX ADMIN — PROPOFOL 75 MCG/KG/MIN: 10 INJECTION, EMULSION INTRAVENOUS at 12:35

## 2023-06-22 RX ADMIN — PAROXETINE HYDROCHLORIDE 20 MG: 20 TABLET, FILM COATED ORAL at 08:23

## 2023-06-22 RX ADMIN — MINERAL OIL AND WHITE PETROLATUM: 150; 830 OINTMENT OPHTHALMIC at 08:45

## 2023-06-22 RX ADMIN — LEVOTHYROXINE SODIUM 112 MCG: 0.11 TABLET ORAL at 08:23

## 2023-06-22 RX ADMIN — IPRATROPIUM BROMIDE 0.5 MG: 0.5 SOLUTION RESPIRATORY (INHALATION) at 07:39

## 2023-06-22 RX ADMIN — Medication 200 MCG/HR: at 12:36

## 2023-06-22 RX ADMIN — PROPOFOL 75 MCG/KG/MIN: 10 INJECTION, EMULSION INTRAVENOUS at 00:13

## 2023-06-22 RX ADMIN — METHYLPREDNISOLONE SODIUM SUCCINATE 62.5 MG: 125 INJECTION INTRAMUSCULAR; INTRAVENOUS at 14:55

## 2023-06-22 RX ADMIN — METHYLPREDNISOLONE SODIUM SUCCINATE 125 MG: 125 INJECTION INTRAMUSCULAR; INTRAVENOUS at 19:54

## 2023-06-22 RX ADMIN — MINERAL OIL AND WHITE PETROLATUM: 150; 830 OINTMENT OPHTHALMIC at 00:54

## 2023-06-22 RX ADMIN — IPRATROPIUM BROMIDE 0.5 MG: 0.5 SOLUTION RESPIRATORY (INHALATION) at 12:02

## 2023-06-22 RX ADMIN — INSULIN ASPART 1 UNITS: 100 INJECTION, SOLUTION INTRAVENOUS; SUBCUTANEOUS at 00:14

## 2023-06-22 RX ADMIN — APIXABAN 5 MG: 5 TABLET, FILM COATED ORAL at 10:50

## 2023-06-22 RX ADMIN — VECURONIUM BROMIDE 0.3 MCG/KG/MIN: 1 INJECTION, POWDER, LYOPHILIZED, FOR SOLUTION INTRAVENOUS at 12:35

## 2023-06-22 RX ADMIN — MINERAL OIL AND WHITE PETROLATUM: 150; 830 OINTMENT OPHTHALMIC at 15:30

## 2023-06-22 RX ADMIN — Medication 0.03 MCG/KG/MIN: at 04:21

## 2023-06-22 RX ADMIN — PROPOFOL 75 MCG/KG/MIN: 10 INJECTION, EMULSION INTRAVENOUS at 06:11

## 2023-06-22 RX ADMIN — INSULIN ASPART 2 UNITS: 100 INJECTION, SOLUTION INTRAVENOUS; SUBCUTANEOUS at 04:20

## 2023-06-22 RX ADMIN — INSULIN ASPART 1 UNITS: 100 INJECTION, SOLUTION INTRAVENOUS; SUBCUTANEOUS at 19:53

## 2023-06-22 RX ADMIN — METHYLPREDNISOLONE SODIUM SUCCINATE 62.5 MG: 125 INJECTION INTRAMUSCULAR; INTRAVENOUS at 08:23

## 2023-06-22 RX ADMIN — VECURONIUM BROMIDE 0.8 MCG/KG/MIN: 1 INJECTION, POWDER, LYOPHILIZED, FOR SOLUTION INTRAVENOUS at 00:47

## 2023-06-22 RX ADMIN — ALBUTEROL SULFATE 15 MG: 2.5 SOLUTION RESPIRATORY (INHALATION) at 00:42

## 2023-06-22 RX ADMIN — LEVALBUTEROL HYDROCHLORIDE 0.63 MG: 0.63 SOLUTION RESPIRATORY (INHALATION) at 07:39

## 2023-06-22 RX ADMIN — INSULIN ASPART 1 UNITS: 100 INJECTION, SOLUTION INTRAVENOUS; SUBCUTANEOUS at 08:26

## 2023-06-22 RX ADMIN — SODIUM CHLORIDE, POTASSIUM CHLORIDE, SODIUM LACTATE AND CALCIUM CHLORIDE: 600; 310; 30; 20 INJECTION, SOLUTION INTRAVENOUS at 08:57

## 2023-06-22 RX ADMIN — PROPOFOL 75 MCG/KG/MIN: 10 INJECTION, EMULSION INTRAVENOUS at 19:05

## 2023-06-22 RX ADMIN — ATORVASTATIN CALCIUM 20 MG: 20 TABLET, FILM COATED ORAL at 08:23

## 2023-06-22 RX ADMIN — ALBUTEROL SULFATE 10 MG/HR: 2.5 SOLUTION RESPIRATORY (INHALATION) at 11:54

## 2023-06-22 RX ADMIN — SODIUM CHLORIDE, POTASSIUM CHLORIDE, SODIUM LACTATE AND CALCIUM CHLORIDE 500 ML: 600; 310; 30; 20 INJECTION, SOLUTION INTRAVENOUS at 14:45

## 2023-06-22 RX ADMIN — KETAMINE HYDROCHLORIDE 28.6 MG/HR: 100 INJECTION, SOLUTION INTRAMUSCULAR; INTRAVENOUS at 21:57

## 2023-06-22 RX ADMIN — Medication 15 ML: at 18:03

## 2023-06-22 RX ADMIN — APIXABAN 5 MG: 5 TABLET, FILM COATED ORAL at 20:09

## 2023-06-22 RX ADMIN — AZITHROMYCIN MONOHYDRATE 500 MG: 500 INJECTION, POWDER, LYOPHILIZED, FOR SOLUTION INTRAVENOUS at 10:55

## 2023-06-22 RX ADMIN — PROPOFOL 75 MCG/KG/MIN: 10 INJECTION, EMULSION INTRAVENOUS at 22:27

## 2023-06-22 RX ADMIN — IPRATROPIUM BROMIDE 0.5 MG: 0.5 SOLUTION RESPIRATORY (INHALATION) at 20:26

## 2023-06-22 RX ADMIN — LEVALBUTEROL HYDROCHLORIDE 0.63 MG: 0.63 SOLUTION RESPIRATORY (INHALATION) at 04:26

## 2023-06-22 RX ADMIN — INSULIN ASPART 1 UNITS: 100 INJECTION, SOLUTION INTRAVENOUS; SUBCUTANEOUS at 16:10

## 2023-06-22 RX ADMIN — ALBUTEROL SULFATE 10 MG/HR: 2.5 SOLUTION RESPIRATORY (INHALATION) at 20:23

## 2023-06-22 RX ADMIN — DILTIAZEM HYDROCHLORIDE 60 MG: 30 TABLET, FILM COATED ORAL at 23:44

## 2023-06-22 RX ADMIN — INSULIN ASPART 1 UNITS: 100 INJECTION, SOLUTION INTRAVENOUS; SUBCUTANEOUS at 12:42

## 2023-06-22 RX ADMIN — IPRATROPIUM BROMIDE 0.5 MG: 0.5 SOLUTION RESPIRATORY (INHALATION) at 16:27

## 2023-06-22 RX ADMIN — DILTIAZEM HYDROCHLORIDE 60 MG: 30 TABLET, FILM COATED ORAL at 16:10

## 2023-06-22 RX ADMIN — PROPOFOL 65 MCG/KG/MIN: 10 INJECTION, EMULSION INTRAVENOUS at 03:28

## 2023-06-22 RX ADMIN — SODIUM BICARBONATE 50 MEQ: 84 INJECTION, SOLUTION INTRAVENOUS at 20:09

## 2023-06-22 RX ADMIN — ALBUTEROL SULFATE 10 MG/HR: 2.5 SOLUTION RESPIRATORY (INHALATION) at 15:27

## 2023-06-22 RX ADMIN — INSULIN ASPART 2 UNITS: 100 INJECTION, SOLUTION INTRAVENOUS; SUBCUTANEOUS at 23:47

## 2023-06-22 RX ADMIN — PANTOPRAZOLE SODIUM 40 MG: 40 INJECTION, POWDER, FOR SOLUTION INTRAVENOUS at 08:26

## 2023-06-22 RX ADMIN — PROPOFOL 75 MCG/KG/MIN: 10 INJECTION, EMULSION INTRAVENOUS at 15:27

## 2023-06-22 RX ADMIN — Medication 200 MCG/HR: at 05:02

## 2023-06-22 RX ADMIN — Medication 0.03 MCG/KG/MIN: at 12:36

## 2023-06-22 RX ADMIN — NICOTINE 1 PATCH: 14 PATCH, EXTENDED RELEASE TRANSDERMAL at 08:44

## 2023-06-22 RX ADMIN — PROPOFOL 75 MCG/KG/MIN: 10 INJECTION, EMULSION INTRAVENOUS at 08:58

## 2023-06-22 ASSESSMENT — ACTIVITIES OF DAILY LIVING (ADL)
ADLS_ACUITY_SCORE: 36
ADLS_ACUITY_SCORE: 36
ADLS_ACUITY_SCORE: 32
ADLS_ACUITY_SCORE: 32
ADLS_ACUITY_SCORE: 36
ADLS_ACUITY_SCORE: 32
ADLS_ACUITY_SCORE: 32
ADLS_ACUITY_SCORE: 36
ADLS_ACUITY_SCORE: 32
ADLS_ACUITY_SCORE: 32
ADLS_ACUITY_SCORE: 36
ADLS_ACUITY_SCORE: 32

## 2023-06-22 NOTE — PROGRESS NOTES
RUE and LUE restraints discontinued at 12:58 AM on 6/22/2023.    Restraint discontinue criteria met, patient is paralyzed, RASS -4 to -5, cooperative and safe. Restraints removed.     Patient's Response:  Patient on a paralytic  Family Notification: Other  Attending Physician Notified: MD ordered restraint      Jeffry Benjamin RN

## 2023-06-22 NOTE — PROGRESS NOTES
ICU staff  DOS 6/22/2023    Overnight, Ms Melendez required increasing sedation for dyssynchrony and was ultimately paralyzed with vecuronium with some improvement. She remains deeply sedated this morning. Her intrinsic PEEP has trended up from 5->12 over the course of the evening despite decreasing Vt.    Vitals:   Temp:  [97  F (36.1  C)-98.9  F (37.2  C)] 97  F (36.1  C)  Pulse:  [] 81  Resp:  [0-91] 18  BP: ()/() 104/60  FiO2 (%):  [30 %-50 %] 50 %  SpO2:  [75 %-100 %] 94 %     Vent Mode: CMV/AC  (Continuous Mandatory Ventilation/ Assist Control)  FiO2 (%): 50 %  Resp Rate (Set): 18 breaths/min  Tidal Volume (Set, mL): 350 mL  PEEP (cm H2O): 5 cmH2O  Resp: 18  Ppk 50-58, Ppl 20    I/O last 3 completed shifts:  In: 3242.94 [I.V.:3242.94]  Out: 1835 [Urine:1435; Emesis/NG output:400]    Fentanyl 200  Propofol 75  Vec  NE 0.03    Exam:  Gen: Intubated, sedated, chemically paralyzed  HEENT: NC/AT  Pulm: Diffuse wheezes with poor air movement  Cor: RRR  Abdomen/GI: Soft, nondistended  : Hines in place  Extremities: Warm, nonedematous  Skin: Well-perfused  Neuro: Sedated  Psych: Unable to assess    Data:   Labs reviewed  - WBCs 11.7 (10)  - 7.39.57/189/30 after intubation  Imaging personally reviewed  - Repeat CXR last night showed clear lungs, ETT in better position  Nothing on micro yet    Assessment/plan:  56 y/o woman with severe COPD on home oxygen 3L admitted with COPD exacerbation and acute on chronic mixed respiratory failure. Remains very obstructed with air trapping/auto-PEEP and significant bronchospasm.  CNS: Intubated, sedated, paralyzed.  # Acute pain  # Sedation  # Anxiety/depression  - Continue fentanyl, propofol  - Has prn midazolam, potentially switch over to midazolam as sedating agent  - Vec x24 hours planned with goal of coming off tomorrow  - Home paroxetine  Pulm: Auto-PEEP trending up a bit, has wheezing throughout. Is on appropriate coverage with methylprednisolone, albuterol,  ipratropium   # Acute on chronic hypercarbic respiratory failure  # Severe COPD  # Oxygen dependence at [facility]  # Severe bronchospasm  # COPD exacerbation  - Continue mechanical ventilation  - Airway pressures seem to be coming down a bit, but with bronchospasm will first trial continuous albuterol  - If this does not help, or if Paw continues to come up, will reduce Vt and accept permissive hypercarbia  - Conisder adding ketamine infusion as adjunct  - Pulmonology is following  CV: Mild hypotension this morning.   # Hypotension, multifactorial (sedation, paralytic, positive pressure)  # Essential hypertension  # Paroxysmal atrial fibrillation  - Fluid support prn, wean pressor as able  - Hold antihypertensives  - Continues on apixiban  GI: Abdomen benign  # Nutrition  - Will need to start on enteral support, though can wait until we're off paralytics (hopefully tomorrow -- if not, will ask for NJ tube)  : UOP adequate overall. Lactic acid 4 -- ?related to beta agonists.  # Elevated lactic acid  - Anion gap normal  - Recheck lactate  Heme: Hb 11.9.  Msk: Extremities warm, well-perfused.   Endo: Glucose 177-204.  # Hypothyroidism  # Stress/steroid hyperglycemia  - Continue sliding scale insulin.  - Levothyroixine.  ID: Afebrile, WBCs 11.7.   # COPD exacerbation  - On azithromycin.  ICU: Apixiban, PPI.    This patient is critically ill to my assessment and requires ICU monitoring and cares. A total of 45 minutes critical care time spent on 6/22/2023, exclusive of procedures.     Jn Ovalles MD, PhD  Surgical critical care  6/22/2023, 11:37 AM

## 2023-06-22 NOTE — PLAN OF CARE
Problem: COPD (Chronic Obstructive Pulmonary Disease)  Goal: Improved Nutrition Intake  Outcome: Not Progressing     Goal Outcome Evaluation:      Plan of Care Reviewed With: other (see comments) (chart review)    Overall Patient Progress: no changeOverall Patient Progress: no change    Outcome Evaluation: Initiated TF w/ Promote @ 10ml/hr to provide 240kcals, 15g protein, 31g CHO, 6g fat, 201ml free water, 180ml water from flushes for a total of 381ml fluid daily.     Pt will be receiving 1006kcals w/ propfol daily. Could not order protein modulars dt drug interaction with paxel.

## 2023-06-22 NOTE — PROVIDER NOTIFICATION
[facility] ICU called and Alee RN was spoken to regarding patient's blood pressure. Patient MAP have been around 60 and systolic's low 90's. RN lowered Propofol to 65 and Fentanyl to 175, but still no improvement in blood pressure. Will continue to monitor.

## 2023-06-22 NOTE — PROGRESS NOTES
[facility] ICU VENTILATOR RESPIRATORY NOTE  Date of Admission: 6/20/23  Date of Intubation (most recent): 6/21/23  Reason for Mechanical Ventilation: Respiratory failure  Number of Days on Mechanical Ventilation: 2  Met Criteria for Pressure Support Trial: No  Reason for No Pressure Support Trial: Patient is paralyzed  Vent Mode: CMV/AC  (Continuous Mandatory Ventilation/ Assist Control)  FiO2 (%): 50 %  Resp Rate (Set): 18 breaths/min  Tidal Volume (Set, mL): 350 mL  PEEP (cm H2O): 5 cmH2O  Resp: 12     Significant Events Today: Patient has needed to be placed on vec continues to have air trapping with high PIP as well  ABG Results:   Venous Blood Gas  Recent Labs   Lab 06/22/23  0518 06/21/23  1524 06/21/23  1159 06/21/23  0622   PHV 7.26* 7.36 7.28* 7.38   PCO2V 69* 55* 59* 48   PO2V 62* 59* 97* 70*   HCO3V 31* 31* 28 29*   SSUANNAH 2.0* 4.5* -0.3 2.7*   O2PER 50 30 40 25     ETT appearance on chest x-ray: EXAM: XR CHEST PORT 1 VIEW  LOCATION: [facility]  DATE: 6/21/2023     INDICATION: check ETT position  COMPARISON: 06/21/2023                                                                      IMPRESSION: Endotracheal tube in good position 4 cm above the stevie. NG tube courses below the diaphragm. The lungs are clear.    Plan:  Continue to monitor    RT Dre on 6/22/2023 at 5:53 AM

## 2023-06-22 NOTE — PLAN OF CARE
ICU SHIFT NOTE     Pertinent assessments:  Rass -5  Tele: SR/ST w/ new ST depression  Dilt and anticoag restarted  Nutrition consulted  BIS: 20's-60's    Major shift events:  Added cont. Neb  PICC placed, tubing changed  500 ml LR bolus  Continues air trapping on vent  Labs sent  TF started    Bedside handoff: Y  Discharge plan: TBD      Notified provider about indwelling el catheter discussed removal or continued need.    Did provider choose to remove indwelling el catheter? NO    Provider's el indication for keeping indwelling el catheter: Indication for continued use: Deep Sedation/Paralysis    Is there an order for indwelling el catheter? YES    *If there is a plan to keep el catheter in place at discharge daily notification with provider is not necessary, but please add a notation in the treatment team sticky note that the patient will be discharging with the catheter.

## 2023-06-22 NOTE — PHARMACY-ADMISSION MEDICATION HISTORY
Pharmacist Admission Medication History    Admission medication history is complete. The information provided in this note is only as accurate as the sources available at the time of the update.    Medication reconciliation/reorder completed by provider prior to medication history? Yes    [facility]    Pertinent Information: Patient is on vent, unable to complete full med rec at this time. It is worth noting Trelegy 200-62.5-25 was prescribed for this patient 6/15/23. Patient also likely is no longer taking cyclobenzaprine at home. 30 tablets last dispensed in April.     Changes made to PTA medication list:    Added: None    Deleted: None    Changed: None    Medication Affordability:Unable to assess       Allergies reviewed with patient and updates made in EHR: unable to assess.    Medication History Completed By: Jose Luis Heaton RPH 6/21/2023 7:21 PM    Prior to Admission medications    Medication Sig Last Dose Taking? Auth Provider Long Term End Date   albuterol (PROAIR HFA/PROVENTIL HFA/VENTOLIN HFA) 108 (90 Base) MCG/ACT inhaler Inhale 2 puffs into the lungs every 4 hours as needed for shortness of breath / dyspnea or wheezing Inhale 1-2 Puffs every 4 hours as needed for Wheezing.   Mark Olsen, DO Yes    albuterol (PROVENTIL) (2.5 MG/3ML) 0.083% neb solution Take 1 vial (2.5 mg) by nebulization every 4 hours as needed for shortness of breath or wheezing   Tommie Ren MD Yes 3/26/23   apixaban ANTICOAGULANT (ELIQUIS) 5 MG tablet Take 1 tablet (5 mg) by mouth 2 times daily   Luis Alberto Valentin MD     atorvastatin (LIPITOR) 20 MG tablet Take 20 mg by mouth daily   Reported, Patient Yes    clonazePAM (KLONOPIN) 0.5 MG tablet Take 0.5 mg by mouth daily   Unknown, Entered By History Yes    cyclobenzaprine (FLEXERIL) 5 MG tablet Take 1 tablet (5 mg) by mouth every 8 hours as needed for muscle spasms   Luis Alberto Valentin MD     diclofenac (VOLTAREN) 1 % topical  gel Apply 2 g topically 4 times daily   Luis Alberto Valentin MD     diltiazem ER COATED BEADS (CARDIZEM CD/CARTIA XT) 240 MG 24 hr capsule Take 1 capsule (240 mg) by mouth daily   Luis Alberto Valentin MD Yes    fluticasone-salmeterol (ADVAIR-HFA) 230-21 MCG/ACT inhaler Inhale 2 puffs into the lungs 2 times daily   Mark Olsen,  Yes    ipratropium - albuterol 0.5 mg/2.5 mg/3 mL (DUONEB) 0.5-2.5 (3) MG/3ML neb solution Take 1 vial (3 mLs) by nebulization every 6 hours as needed for shortness of breath / dyspnea or wheezing   Morales Alva MD Yes    levothyroxine (SYNTHROID/LEVOTHROID) 112 MCG tablet Take 112 mcg by mouth daily   Reported, Patient Yes    losartan (COZAAR) 25 MG tablet Take 1 tablet (25 mg) by mouth daily   Kody Prieto,  Yes    nicotine (NICODERM CQ) 21 MG/24HR 24 hr patch Place 1 patch onto the skin daily   Luis Alberto Valentin MD     PARoxetine (PAXIL) 20 MG tablet Take 20 mg by mouth daily   Unknown, Entered By History No    predniSONE (DELTASONE) 20 MG tablet Take 60 mg for 2 days, then 40 mg for 3 days, then 20 mg for 3 days, then 10 mg for 3 days, then stop   Luis Alberto Valentin MD     tiotropium (SPIRIVA RESPIMAT) 2.5 MCG/ACT inhaler Inhale 2 puffs into the lungs daily as needed   Unknown, Entered By History No

## 2023-06-22 NOTE — PROGRESS NOTES
ICU End of Shift Summary.  For vital signs and complete assessments, please see documentation flowsheets.      Pertinent assessments:  CV: SR HR 70's-90's, softer blood pressures- norepi started and currently @ 0.03. Dilt and Lisinopril held per MD due to softer blood pressures.   Neuro: BUE restraints discontinued- patient started on a paralytic due to vent dyssynchrony. RASS goal -4 to -5, TOF goal 2/4 twitches, BIS monitoring in place. Prop and Fentanyl.   Pulm: Vent, PRN neb frequency increased to q2h.  Renal: Hines, okay UO   GI: NPO, NG to LIS   Endo: q4h blood sugars with sliding scale, IV steroids  Heme: trending hemoglobins, Eliquis auto-held  Skin: No changes   ID: Azithromycin     Major Shift Events: Paralytic started due to vent dysysnchrony       Bedside Shift Report Completed : Yes  Bedside Safety Check Completed: Yes

## 2023-06-22 NOTE — PROGRESS NOTES
[facility] ICU VENTILATOR RESPIRATORY NOTE  Date of Admission: 6/20/2023  Date of Intubation (most recent): 6/21/2023  Reason for Mechanical Ventilation: Respiratory failure  Number of Days on Mechanical Ventilation: 3  Met Criteria for Pressure Support Trial: no  Reason for No Pressure Support Trial: On pressors and paralytics   Significant Events Today: Continues to have large amounts of air trapping and PIP in the 50's.  She continues to have bilateral I&E wheezing that has remained unchanged since starting albuterol 10 mg/hr at 11:54.

## 2023-06-22 NOTE — PROGRESS NOTES
Intensivist brief update  DOS: 6/22/2023    Increasing methylprednisolone to 125 mg q12h to see if this helps with bronchospasm.    HR up a bit with albuterol; if this persists may need to switch course.    Lactate trending down at 3.1, further values will be difficult to interpret in light of continuous albuterol.    UOP down a bit, still on a little pressor -- will give an LR bolus and follow.    Jn Ovalles MD, PhD  Surgical critical care  June 22, 2023, 2:16 PM

## 2023-06-22 NOTE — PROVIDER NOTIFICATION
Tele hub called at 2235 due to patient not ventilating and fighting against the vent. The writer requested to have an order for Vecuronium, awaiting MD to call back.    Addendum: MD monroe called back from tele ICU. Versed PRN 4 mg order was placed. Plan to try versed for  now and reassess in 30 minutes after administration per tele hub ICU   Report given to bedside EDINSON Teran.

## 2023-06-22 NOTE — PROGRESS NOTES
[facility]    Hospitalist Progress Note  Name: Lara Melendez    MRN: [id number]  Provider:  Kody Prieto DO, MPH  Date of Service: 06/22/2023    Summary of Stay: Lara Melendez is a 57 year old female with PMH including COPD, emphysema, as needed 2 L O2 at home, tobacco dependence, HTN, anxiety, paroxysmal A-fib on Eliquis, hypothyroidism, psoriasis, and HLD who presents with worsening shortness of breath and wheezing.      Problem List:   1. Acute hypoxic respiratory failure secondary to COPD exacerbation: Initially was on BiPAP following admission but she deteriorated so was intubated yesterday.  Continue ventilator settings per intensivist.  Currently on IV Solu-Medrol and receiving nebulizers.  Started on vecuronium.  Will receive continuous nebulizer today and possible proning.  Continue azithromycin.  Pulmonology consulted prior to intubation and has made adjustments to her inhaler regimen.  2. Paroxysmal atrial fibrillation: Currently rate controlled.  Holding prior to admission diltiazem due to hypotension.  Resume Eliquis for stroke prophylaxis.  3. Steroid-induced hyperglycemia: We will start Lantus 10 units daily and medium sliding scale insulin.  4. Lactic acidosis: Blood pressure relatively stable and metabolic panel normal.  I suspect this is due to nebulizers.  We will recheck in 2 hours to ensure improvement.  5. Anxiety: Holding prior to admission clonazepam.  Continue paroxetine and midazolam.  6. Hypertension: Hold prior to admission losartan and diltiazem.  7. Tobacco dependence: Nicotine replacement.  8. Malnutrition: RD consult for initiation of tube feeds.    DVT Prophylaxis: DOAC  Code Status: Full Code  Diet: NPO for Medical/Clinical Reasons Except for: Meds    Hines Catheter: PRESENT, indication: Deep Sedation/Paralysis  Disposition: Expected discharge in 3+ days to TBD. Goals prior to discharge include manage ICU issues.   Incidental Findings: As above.  Family  updated today: Will updated family this afternoon.    40 MINUTES SPENT BY ME on the date of service doing chart review, history, exam, documentation & further activities per the note.      Interval History   Intubated, sedated.  Overnight events reviewed.  Discussed with intensivist, RT, and RN.    -Data reviewed today: I personally reviewed all new labs and imaging results over the last 24 hours.     Physical Exam   Temp: 97  F (36.1  C) Temp src: Temporal BP: 104/60 Pulse: 81   Resp: 18 SpO2: 94 % O2 Device: Mechanical Ventilator    Vitals:    06/21/23 0304   Weight: 64.5 kg (142 lb 3.2 oz)     Vital Signs with Ranges  Temp:  [97  F (36.1  C)-98.9  F (37.2  C)] 97  F (36.1  C)  Pulse:  [] 81  Resp:  [0-91] 18  BP: ()/() 104/60  FiO2 (%):  [30 %-50 %] 50 %  SpO2:  [75 %-100 %] 94 %  I/O last 3 completed shifts:  In: 3242.94 [I.V.:3242.94]  Out: 1835 [Urine:1435; Emesis/NG output:400]    GENERAL: No apparent distress. Intubated, sedated.  HEENT: Normocephalic, atraumatic.   CARDIOVASCULAR: Regular rate and rhythm without murmurs or rubs. No S3.  PULMONARY: Coarse wheezing bilaterally.  GASTROINTESTINAL: Soft, non-tender, non-distended. Bowel sounds normoactive.   EXTREMITIES: No cyanosis or clubbing. No edema.  NEUROLOGICAL: Sedated.  DERMATOLOGICAL: No rash, ulcer, bruising, nor jaundice.     Medications     albuterol (PROVENTIL) 40 mg in sodium chloride 0.9 % 32 mL continuous nebulization solution       fentaNYL 200 mcg/hr (06/22/23 0730)     lactated ringers 100 mL/hr at 06/22/23 1000     propofol 75 mcg/kg/min (06/22/23 0858)    And     - MEDICATION INSTRUCTIONS -       norepinephrine 0.03 mcg/kg/min (06/22/23 0730)     - MEDICATION INSTRUCTIONS -       vecuronium (NORCURON) 1 mg/mL in D5W 50 mL 0.31 mcg/kg/min (06/22/23 1045)       apixaban ANTICOAGULANT  5 mg Oral BID     artificial tears   Both Eyes Q8H     atorvastatin  20 mg Oral Daily     azithromycin  500 mg Intravenous Q24H     [Held by  provider] clonazePAM  0.5 mg Oral Daily     [Held by provider] diltiazem ER COATED BEADS  240 mg Oral Daily     fluticasone-vilanterol  1 puff Inhalation Daily     insulin aspart  1-6 Units Subcutaneous Q4H     ipratropium  0.5 mg Nebulization 4x daily     levalbuterol  0.63 mg Nebulization 4x Daily     levothyroxine  112 mcg Oral Daily     [Held by provider] losartan  25 mg Oral Daily     methylPREDNISolone  62.5 mg Intravenous BID     nicotine  1 patch Transdermal Daily     nicotine   Transdermal Q8H     pantoprazole  40 mg Intravenous BID     PARoxetine  20 mg Oral Daily     sodium chloride (PF)  10-40 mL Intracatheter Q8H     sodium chloride (PF)  3 mL Intracatheter Q8H     Data     Laboratory:  Recent Labs   Lab 06/22/23  0518 06/21/23 2051 06/21/23 0622   WBC 11.7* 10.1 8.5   HGB 11.9  11.9 12.5 12.5   HCT 36.4 38.8 38.6   * 102* 101*    226 201     Recent Labs   Lab 06/22/23  0743 06/22/23  0518 06/22/23  0420 06/21/23  0820 06/21/23  0622 06/21/23  0338 06/21/23  0007 06/21/23  0003   NA  --  140  --   --  139  --  139 138   POTASSIUM  --  4.0  --   --  4.4  --  5.1 5.3   CHLORIDE  --  100  --   --  100  --  99 99   CO2  --  28  --   --  26  --   --  28   ANIONGAP  --  12  --   --  13  --   --  11   * 204* 203*   < > 151*   < > 126* 126*   BUN  --  14.1  --   --  8.8  --  7 7.9*   CR  --  0.82  --   --  0.62  --  0.6 0.63   GFRESTIMATED  --  82  --   --  >90  --   --  >90   EDIN  --  9.1  --   --  9.1  --   --  8.9    < > = values in this interval not displayed.     No results for input(s): CULT in the last 168 hours.    Imaging:  Recent Results (from the past 24 hour(s))   XR Chest Port 1 View    Narrative    XR CHEST PORT 1 VIEW 6/21/2023 12:32 PM    HISTORY: Endotracheal tube placement    COMPARISON: X-ray the same date at 0041 hours      Impression    IMPRESSION: The endotracheal tube tip is not well-seen but appears to  be approximately 7.6 cm above the stevie. Enteric tube with  the distal  visualized portion below the diaphragm. No pneumothorax. No focal  pneumonic consolidation or pleural effusion. Stable clustered  calcified nodules in the left upper lobe. Normal heart size.    KAYLA HUANG MD         SYSTEM ID:  [id number]   XR Abdomen Port 1 View    Narrative    XR ABDOMEN PORT 1 VIEW 6/21/2023 12:33 PM    HISTORY: NG placement    COMPARISON: Chest x-ray the same date      Impression    IMPRESSION: Interval placement of an enteric section tube with the tip  and side-port in satisfactory position within the gastric lumen.  Nonobstructive bowel gas pattern. Right upper quadrant surgical clips.  Prominent vascular calcifications.    KAYLA HUANG MD         SYSTEM ID:  [id number]   XR Chest Port 1 View    Narrative    EXAM: XR CHEST PORT 1 VIEW  LOCATION: [facility]  DATE: 6/21/2023    INDICATION: check ETT position  COMPARISON: 06/21/2023      Impression    IMPRESSION: Endotracheal tube in good position 4 cm above the stevie. NG tube courses below the diaphragm. The lungs are clear.         Kody Prieto DO MPH  [facility] Hospitalist  [address] [place] [address]  06/22/2023

## 2023-06-22 NOTE — PROGRESS NOTES
TELE:  Notified of vent dyssynchrony.  Per Dr. Ovalles's signout, versed worked well for sedation earlier today.  Will try 4 mg of versed and order placed for further prn doses.  Also, increased frequency of xopenex dosing to q2hr prn.    ADDENDUM 00:00:  Continued dyssynchrony despite versed.  RASS -3 to -4.  Push dose vec given with signifcant improvement.  Will order vec drip, change rass goal to -4 and give 15 mL albuterol neb.    ADDENDUM 0400:  Notified of mild hypotension--maps low 60s.  camera'd in.  Continues to have some breath stacking and autopeep of about 15-20, but insp and exp flow waveform areas have equalized quite a bit--suspect air trapping has stabilized.  Starting peripheral levophed as needed.  Holding diltiazem and losartan for now.

## 2023-06-22 NOTE — CONSULTS
"CLINICAL NUTRITION SERVICES - ASSESSMENT NOTE     Nutrition Prescription    RECOMMENDATIONS FOR MDs/PROVIDERS TO ORDER:  None    Malnutrition Status:    Patient does not meet criteria     Recommendations already ordered by Registered Dietitian (RD):  Initiate TF w/ Promote @ 10ml/hr to provide 240kcals, 15g protein, 31g CHO, 6g fat, 201ml free water, 180ml water from flushes for a total of 381ml fluid daily.     Pt will be receiving 1006kcals w/ propfol daily.     Future/Additional Recommendations:  Increase TF as able pending propofol rate, could not order protein modulars dt drug interaction with paxel       REASON FOR ASSESSMENT  Lara Melendez is a/an 57 year old female assessed by the dietitian for Provider Order - Registered Dietitian to Assess and Order TF per Medical Nutrition Therapy Protocol    Pt presents with acute on chronic respiratory failure, COPD, A. Fib, depression/anxiety, hypothyroidism, HTN, HLD, tobacco dependence, lactic acidosis    NUTRITION HISTORY  Unable to obtain from pt. NKFA.     CURRENT NUTRITION ORDERS  Diet: NPO  Intake/Tolerance: Poor    Lactated ringers @ 100ml/hr providing 2400ml fluid daily    Propofol @ 29ml/hr providing 766kcals daily    LABS  Labs reviewed: BG , Mg 2.4, Phos 5.9, PCO2 57, hgb 11.4, , MCH 33.5, rbc 3.55     MEDICATIONS  Medications reviewed: zithromax, novolog, lactated ringers, synthroid/levothroid, solu-medrol, protonix, diprivan    ANTHROPOMETRICS  Height: 160 cm (5' 3\") vs 5' 5'' on 5/26/22  Most Recent Weight: 64.5 kg (142 lb 3.2 oz)    IBW: 52.4 kg  BMI: Overweight BMI 25-29.9  Weight History:   Wt Readings from Last 30 Encounters:   06/21/23 64.5 kg (142 lb 3.2 oz)   05/23/2023 59 kg (130 lb)   04/01/23 68.8 kg (151 lb 9.6 oz)   02/11/23 60 kg (132 lb 4.4 oz)   12/07/2022 61.2 kg (135 lb)   12/02/22 61.3 kg (135 lb 2.3 oz)   09/28/22 63.1 kg (139 lb 1.6 oz)   08/29/22 61.7 kg (136 lb 1.6 oz)   05/17/22 61.2 kg (134 lb 14.4 oz)   04/02/22 " 58.9 kg (129 lb 12.8 oz)   02/21/22 59 kg (130 lb)   02/04/22 59 kg (130 lb)   02/04/22 59 kg (130 lb)   01/11/22 60.7 kg (133 lb 14.4 oz)       Dosing Weight: 64.5 kg    ASSESSED NUTRITION NEEDS  Estimated Energy Needs: 1171 kcals/day (Flip State Equation (PSU) 2003b )  Justification: Maintenance  Estimated Protein Needs: 77-97 grams protein/day (1.2 - 1.5 grams of pro/kg)  Justification: Hypercatabolism with critical illness and Increased needs  Estimated Fluid Needs: 1925 mL/day (30 mL/kg)   Justification: Maintenance    PHYSICAL FINDINGS  See malnutrition section below.  Skin tear forearm  Dry skin       MALNUTRITION:  % Weight Loss:  None noted  % Intake: Unable to assess  Subcutaneous Fat Loss:  None observed  Muscle Loss:  Clavicle bone region mild depletion and Patellar region mild depletion  Fluid Retention:  None noted    Malnutrition Diagnosis: Patient does not meet two of the above criteria necessary for diagnosing malnutrition    NUTRITION DIAGNOSIS  Inadequate oral intake related to acute on chronic respiratory failure as evidenced by mechanical ventilation      INTERVENTIONS  Implementation  Nutrition Education: Not appropriate at this time due to patient condition   Enteral Nutrition - Initiate  Multivitamin/mineral supplement therapy     Goals  Total avg nutritional intake to meet a minimum of 1171 kcal/kg and 77 g PRO/kg daily (per dosing wt 64.5 kg).     Monitoring/Evaluation  Progress toward goals will be monitored and evaluated per protocol. TF tolerance, wt, labs, respiratory status

## 2023-06-22 NOTE — PROCEDURES
[facility]    Triple Lumen PICC Placement    Date/Time: 6/22/2023 11:30 AM    Performed by: Mayte Price RN  Authorized by: Kody Prieto DO  Indications: vascular access      UNIVERSAL PROTOCOL   Site Marked: Yes  Prior Images Obtained and Reviewed:  Yes  Required items: Required blood products, implants, devices and special equipment available    Patient identity confirmed:  Arm band, provided demographic data and hospital-assigned identification number  NA - No sedation, light sedation, or local anesthesia  Confirmation Checklist:  Patient's identity using two indicators, relevant allergies, correct equipment/implants were available and procedure was appropriate and matched the consent or emergent situation  Time out: Immediately prior to the procedure a time out was called    Universal Protocol: the Joint Commission Universal Protocol was followed    Preparation: Patient was prepped and draped in usual sterile fashion       ANESTHESIA    Anesthesia: Local infiltration  Local Anesthetic:  Lidocaine 1% without epinephrine  Anesthetic Total (mL):  2      SEDATION    Patient Sedated: No        Preparation: skin prepped with ChloraPrep  Skin prep agent: skin prep agent completely dried prior to procedure  Sterile barriers: maximum sterile barriers were used: cap, mask, sterile gown, sterile gloves, and large sterile sheet  Hand hygiene: hand hygiene performed prior to central venous catheter insertion  Type of line used: PICC  Catheter type: triple lumen  Lumen type: power PICC and valved  Lumen Identification: Red, White and Gray  Catheter size: 5 Fr  Brand: Bard  Lot number: [id number]  Placement method: venipuncture, MST, ultrasound and tip navigation system  Number of attempts: 1  Difficulty threading catheter: no  Successful placement: yes  Orientation: right  : 6.7 mm.  Tip Location: SVC/RA Junction  Arm circumference: adults 10 cm  Extremity circumference: 26  Visible catheter  length: 0  Total catheter length: 39  Dressing and securement: adhesive securement device, alcohol impregnated caps, blood cleaned with CHG, secure lock, secured with tape, site cleansed, sterile dressing applied and thrombin hemostasis patch applied  Post procedure assessment: blood return through all ports, free fluid flow and placement verified by 3CG technology  PROCEDURE Describe Procedure: RUE PICC line inserted into medial brachial vein without difficulty. 3CG tip confirmation utilized during insertion. PICC tip is at CAJ and ready to use. Pt had localized bleeding at site after insertion. Pressure held and stat seal applied. Pt sedated and ventilated and all medications managed by bedside RN during insertion.Bedside RN Dior tobin. See flowsheet for additional details.  Disposal: sharps and needle count correct at the end of procedure, needles and guidewire disposed in sharps container

## 2023-06-23 ENCOUNTER — APPOINTMENT (OUTPATIENT)
Dept: GENERAL RADIOLOGY | Facility: CLINIC | Age: 58
End: 2023-06-23
Attending: INTERNAL MEDICINE
Payer: COMMERCIAL

## 2023-06-23 ENCOUNTER — HOSPITAL ENCOUNTER (OUTPATIENT)
Age: 58
End: 2023-06-23
Payer: COMMERCIAL

## 2023-06-23 LAB
ALLEN'S TEST: NO
ANION GAP SERPL CALCULATED.3IONS-SCNC: 7 MMOL/L (ref 7–15)
BASE EXCESS BLDA CALC-SCNC: 3.5 MMOL/L (ref -9–1.8)
BASE EXCESS BLDA CALC-SCNC: 4.6 MMOL/L (ref -9–1.8)
BASE EXCESS BLDA CALC-SCNC: 5.9 MMOL/L (ref -9–1.8)
BASE EXCESS BLDV CALC-SCNC: 6.2 MMOL/L (ref -7.7–1.9)
BUN SERPL-MCNC: 20 MG/DL (ref 8–23)
CALCIUM SERPL-MCNC: 8.7 MG/DL (ref 8.6–10)
CHLORIDE SERPL-SCNC: 99 MMOL/L (ref 98–107)
CK SERPL-CCNC: 62 U/L (ref 26–192)
CREAT SERPL-MCNC: 0.97 MG/DL (ref 0.51–0.95)
DEPRECATED HCO3 PLAS-SCNC: 31 MMOL/L (ref 22–29)
ERYTHROCYTE [DISTWIDTH] IN BLOOD BY AUTOMATED COUNT: 13.2 % (ref 10–15)
GFR SERPL CREATININE-BSD FRML MDRD: 67 ML/MIN/1.73M2
GLUCOSE BLDC GLUCOMTR-MCNC: 159 MG/DL (ref 70–99)
GLUCOSE BLDC GLUCOMTR-MCNC: 165 MG/DL (ref 70–99)
GLUCOSE BLDC GLUCOMTR-MCNC: 169 MG/DL (ref 70–99)
GLUCOSE BLDC GLUCOMTR-MCNC: 169 MG/DL (ref 70–99)
GLUCOSE BLDC GLUCOMTR-MCNC: 178 MG/DL (ref 70–99)
GLUCOSE SERPL-MCNC: 174 MG/DL (ref 70–99)
HCO3 BLD-SCNC: 33 MMOL/L (ref 21–28)
HCO3 BLDV-SCNC: 37 MMOL/L (ref 21–28)
HCT VFR BLD AUTO: 35.1 % (ref 35–47)
HGB BLD-MCNC: 10.7 G/DL (ref 11.7–15.7)
HGB BLD-MCNC: 10.7 G/DL (ref 11.7–15.7)
HGB BLD-MCNC: 10.8 G/DL (ref 11.7–15.7)
HGB BLD-MCNC: 10.8 G/DL (ref 11.7–15.7)
MAGNESIUM SERPL-MCNC: 2.8 MG/DL (ref 1.7–2.3)
MCH RBC QN AUTO: 32.9 PG (ref 26.5–33)
MCHC RBC AUTO-ENTMCNC: 30.8 G/DL (ref 31.5–36.5)
MCV RBC AUTO: 107 FL (ref 78–100)
O2/TOTAL GAS SETTING VFR VENT: 40 %
OXYHGB MFR BLD: 93 % (ref 92–100)
PCO2 BLD: 61 MM HG (ref 35–45)
PCO2 BLD: 71 MM HG (ref 35–45)
PCO2 BLD: 80 MM HG (ref 35–45)
PCO2 BLDV: 92 MM HG (ref 40–50)
PH BLD: 7.23 [PH] (ref 7.35–7.45)
PH BLD: 7.28 [PH] (ref 7.35–7.45)
PH BLD: 7.34 [PH] (ref 7.35–7.45)
PH BLDV: 7.21 [PH] (ref 7.32–7.43)
PHOSPHATE SERPL-MCNC: 5.8 MG/DL (ref 2.5–4.5)
PLATELET # BLD AUTO: 205 10E3/UL (ref 150–450)
PO2 BLD: 143 MM HG (ref 80–105)
PO2 BLD: 74 MM HG (ref 80–105)
PO2 BLD: 96 MM HG (ref 80–105)
PO2 BLDV: 56 MM HG (ref 25–47)
POTASSIUM SERPL-SCNC: 4.3 MMOL/L (ref 3.4–5.3)
RBC # BLD AUTO: 3.28 10E6/UL (ref 3.8–5.2)
SODIUM SERPL-SCNC: 137 MMOL/L (ref 136–145)
TRIGL SERPL-MCNC: 156 MG/DL
WBC # BLD AUTO: 14.2 10E3/UL (ref 4–11)

## 2023-06-23 PROCEDURE — 999N000157 HC STATISTIC RCP TIME EA 10 MIN

## 2023-06-23 PROCEDURE — 250N000013 HC RX MED GY IP 250 OP 250 PS 637: Performed by: HOSPITALIST

## 2023-06-23 PROCEDURE — 82803 BLOOD GASES ANY COMBINATION: CPT | Performed by: INTERNAL MEDICINE

## 2023-06-23 PROCEDURE — 80048 BASIC METABOLIC PNL TOTAL CA: CPT | Performed by: INTERNAL MEDICINE

## 2023-06-23 PROCEDURE — 250N000009 HC RX 250: Performed by: SURGERY

## 2023-06-23 PROCEDURE — 82803 BLOOD GASES ANY COMBINATION: CPT | Performed by: SURGERY

## 2023-06-23 PROCEDURE — 71045 X-RAY EXAM CHEST 1 VIEW: CPT

## 2023-06-23 PROCEDURE — 94645 CONT INHLJ TX EACH ADDL HOUR: CPT

## 2023-06-23 PROCEDURE — 84100 ASSAY OF PHOSPHORUS: CPT | Performed by: INTERNAL MEDICINE

## 2023-06-23 PROCEDURE — 258N000003 HC RX IP 258 OP 636: Performed by: HOSPITALIST

## 2023-06-23 PROCEDURE — 250N000011 HC RX IP 250 OP 636: Performed by: INTERNAL MEDICINE

## 2023-06-23 PROCEDURE — 85018 HEMOGLOBIN: CPT | Performed by: HOSPITALIST

## 2023-06-23 PROCEDURE — 258N000003 HC RX IP 258 OP 636: Performed by: INTERNAL MEDICINE

## 2023-06-23 PROCEDURE — 36600 WITHDRAWAL OF ARTERIAL BLOOD: CPT

## 2023-06-23 PROCEDURE — 200N000001 HC R&B ICU

## 2023-06-23 PROCEDURE — 99232 SBSQ HOSP IP/OBS MODERATE 35: CPT | Performed by: HOSPITALIST

## 2023-06-23 PROCEDURE — 84478 ASSAY OF TRIGLYCERIDES: CPT | Performed by: HOSPITALIST

## 2023-06-23 PROCEDURE — 83735 ASSAY OF MAGNESIUM: CPT | Performed by: INTERNAL MEDICINE

## 2023-06-23 PROCEDURE — 36620 INSERTION CATHETER ARTERY: CPT | Performed by: SURGERY

## 2023-06-23 PROCEDURE — 85027 COMPLETE CBC AUTOMATED: CPT | Performed by: INTERNAL MEDICINE

## 2023-06-23 PROCEDURE — 999N000105 HC STATISTIC NO DOCUMENTATION TO SUPPORT CHARGE

## 2023-06-23 PROCEDURE — 250N000013 HC RX MED GY IP 250 OP 250 PS 637: Performed by: INTERNAL MEDICINE

## 2023-06-23 PROCEDURE — 250N000011 HC RX IP 250 OP 636: Performed by: SURGERY

## 2023-06-23 PROCEDURE — 250N000009 HC RX 250: Performed by: INTERNAL MEDICINE

## 2023-06-23 PROCEDURE — 99291 CRITICAL CARE FIRST HOUR: CPT | Mod: 25 | Performed by: SURGERY

## 2023-06-23 PROCEDURE — 99232 SBSQ HOSP IP/OBS MODERATE 35: CPT | Performed by: INTERNAL MEDICINE

## 2023-06-23 PROCEDURE — 258N000003 HC RX IP 258 OP 636: Performed by: SURGERY

## 2023-06-23 PROCEDURE — 82805 BLOOD GASES W/O2 SATURATION: CPT | Performed by: INTERNAL MEDICINE

## 2023-06-23 PROCEDURE — C9113 INJ PANTOPRAZOLE SODIUM, VIA: HCPCS | Mod: JZ | Performed by: HOSPITALIST

## 2023-06-23 PROCEDURE — 250N000011 HC RX IP 250 OP 636: Performed by: HOSPITALIST

## 2023-06-23 PROCEDURE — 82550 ASSAY OF CK (CPK): CPT | Performed by: HOSPITALIST

## 2023-06-23 PROCEDURE — 94003 VENT MGMT INPAT SUBQ DAY: CPT

## 2023-06-23 RX ORDER — PAROXETINE 10 MG/5ML
20 SUSPENSION ORAL DAILY
Status: DISCONTINUED | OUTPATIENT
Start: 2023-06-24 | End: 2023-07-22

## 2023-06-23 RX ORDER — ATORVASTATIN CALCIUM 20 MG/1
20 TABLET, FILM COATED ORAL DAILY
Status: DISCONTINUED | OUTPATIENT
Start: 2023-06-24 | End: 2023-07-28 | Stop reason: HOSPADM

## 2023-06-23 RX ORDER — NOREPINEPHRINE BITARTRATE 0.02 MG/ML
.01-.6 INJECTION, SOLUTION INTRAVENOUS CONTINUOUS
Status: DISCONTINUED | OUTPATIENT
Start: 2023-06-23 | End: 2023-06-28

## 2023-06-23 RX ORDER — LEVOTHYROXINE SODIUM 112 UG/1
112 TABLET ORAL DAILY
Status: DISCONTINUED | OUTPATIENT
Start: 2023-06-24 | End: 2023-07-22

## 2023-06-23 RX ORDER — CYCLOBENZAPRINE HCL 5 MG
5 TABLET ORAL EVERY 8 HOURS PRN
Status: DISCONTINUED | OUTPATIENT
Start: 2023-06-23 | End: 2023-07-28 | Stop reason: HOSPADM

## 2023-06-23 RX ADMIN — INSULIN ASPART 1 UNITS: 100 INJECTION, SOLUTION INTRAVENOUS; SUBCUTANEOUS at 23:59

## 2023-06-23 RX ADMIN — AZITHROMYCIN MONOHYDRATE 500 MG: 500 INJECTION, POWDER, LYOPHILIZED, FOR SOLUTION INTRAVENOUS at 11:18

## 2023-06-23 RX ADMIN — PROPOFOL 75 MCG/KG/MIN: 10 INJECTION, EMULSION INTRAVENOUS at 01:09

## 2023-06-23 RX ADMIN — Medication 15 ML: at 08:40

## 2023-06-23 RX ADMIN — APIXABAN 5 MG: 5 TABLET, FILM COATED ORAL at 20:15

## 2023-06-23 RX ADMIN — SODIUM CHLORIDE, POTASSIUM CHLORIDE, SODIUM LACTATE AND CALCIUM CHLORIDE: 600; 310; 30; 20 INJECTION, SOLUTION INTRAVENOUS at 15:01

## 2023-06-23 RX ADMIN — MINERAL OIL AND WHITE PETROLATUM: 150; 830 OINTMENT OPHTHALMIC at 08:18

## 2023-06-23 RX ADMIN — Medication 200 MCG/HR: at 00:08

## 2023-06-23 RX ADMIN — VECURONIUM BROMIDE 0.5 MCG/KG/MIN: 1 INJECTION, POWDER, LYOPHILIZED, FOR SOLUTION INTRAVENOUS at 07:03

## 2023-06-23 RX ADMIN — SODIUM CHLORIDE, POTASSIUM CHLORIDE, SODIUM LACTATE AND CALCIUM CHLORIDE: 600; 310; 30; 20 INJECTION, SOLUTION INTRAVENOUS at 23:36

## 2023-06-23 RX ADMIN — Medication 200 MCG/HR: at 12:38

## 2023-06-23 RX ADMIN — PROPOFOL 75 MCG/KG/MIN: 10 INJECTION, EMULSION INTRAVENOUS at 04:11

## 2023-06-23 RX ADMIN — Medication 0.03 MCG/KG/MIN: at 03:38

## 2023-06-23 RX ADMIN — IPRATROPIUM BROMIDE 0.5 MG: 0.5 SOLUTION RESPIRATORY (INHALATION) at 19:21

## 2023-06-23 RX ADMIN — INSULIN ASPART 1 UNITS: 100 INJECTION, SOLUTION INTRAVENOUS; SUBCUTANEOUS at 12:17

## 2023-06-23 RX ADMIN — PROPOFOL 75 MCG/KG/MIN: 10 INJECTION, EMULSION INTRAVENOUS at 07:02

## 2023-06-23 RX ADMIN — DILTIAZEM HYDROCHLORIDE 60 MG: 30 TABLET, FILM COATED ORAL at 12:10

## 2023-06-23 RX ADMIN — MINERAL OIL AND WHITE PETROLATUM: 150; 830 OINTMENT OPHTHALMIC at 23:44

## 2023-06-23 RX ADMIN — SODIUM CHLORIDE, POTASSIUM CHLORIDE, SODIUM LACTATE AND CALCIUM CHLORIDE: 600; 310; 30; 20 INJECTION, SOLUTION INTRAVENOUS at 03:54

## 2023-06-23 RX ADMIN — ALBUTEROL SULFATE 10 MG/HR: 2.5 SOLUTION RESPIRATORY (INHALATION) at 05:03

## 2023-06-23 RX ADMIN — MINERAL OIL AND WHITE PETROLATUM: 150; 830 OINTMENT OPHTHALMIC at 16:31

## 2023-06-23 RX ADMIN — INSULIN ASPART 1 UNITS: 100 INJECTION, SOLUTION INTRAVENOUS; SUBCUTANEOUS at 08:16

## 2023-06-23 RX ADMIN — PAROXETINE HYDROCHLORIDE 20 MG: 20 TABLET, FILM COATED ORAL at 09:16

## 2023-06-23 RX ADMIN — PROPOFOL 75 MCG/KG/MIN: 10 INJECTION, EMULSION INTRAVENOUS at 16:45

## 2023-06-23 RX ADMIN — LEVOTHYROXINE SODIUM 112 MCG: 0.11 TABLET ORAL at 08:39

## 2023-06-23 RX ADMIN — IPRATROPIUM BROMIDE 0.5 MG: 0.5 SOLUTION RESPIRATORY (INHALATION) at 11:50

## 2023-06-23 RX ADMIN — LEVALBUTEROL HYDROCHLORIDE 0.63 MG: 0.63 SOLUTION RESPIRATORY (INHALATION) at 11:50

## 2023-06-23 RX ADMIN — IPRATROPIUM BROMIDE 0.5 MG: 0.5 SOLUTION RESPIRATORY (INHALATION) at 15:44

## 2023-06-23 RX ADMIN — ATORVASTATIN CALCIUM 20 MG: 20 TABLET, FILM COATED ORAL at 08:39

## 2023-06-23 RX ADMIN — DILTIAZEM HYDROCHLORIDE 60 MG: 30 TABLET, FILM COATED ORAL at 18:02

## 2023-06-23 RX ADMIN — LEVALBUTEROL HYDROCHLORIDE 0.63 MG: 0.63 SOLUTION RESPIRATORY (INHALATION) at 19:21

## 2023-06-23 RX ADMIN — DILTIAZEM HYDROCHLORIDE 60 MG: 30 TABLET, FILM COATED ORAL at 05:43

## 2023-06-23 RX ADMIN — IPRATROPIUM BROMIDE 0.5 MG: 0.5 SOLUTION RESPIRATORY (INHALATION) at 08:48

## 2023-06-23 RX ADMIN — INSULIN ASPART 1 UNITS: 100 INJECTION, SOLUTION INTRAVENOUS; SUBCUTANEOUS at 03:54

## 2023-06-23 RX ADMIN — PANTOPRAZOLE SODIUM 40 MG: 40 INJECTION, POWDER, FOR SOLUTION INTRAVENOUS at 08:35

## 2023-06-23 RX ADMIN — PROPOFOL 75 MCG/KG/MIN: 10 INJECTION, EMULSION INTRAVENOUS at 10:26

## 2023-06-23 RX ADMIN — PROPOFOL 75 MCG/KG/MIN: 10 INJECTION, EMULSION INTRAVENOUS at 23:36

## 2023-06-23 RX ADMIN — Medication 0.03 MCG/KG/MIN: at 16:37

## 2023-06-23 RX ADMIN — NICOTINE 1 PATCH: 14 PATCH, EXTENDED RELEASE TRANSDERMAL at 08:30

## 2023-06-23 RX ADMIN — METHYLPREDNISOLONE SODIUM SUCCINATE 125 MG: 125 INJECTION INTRAMUSCULAR; INTRAVENOUS at 19:49

## 2023-06-23 RX ADMIN — INSULIN ASPART 1 UNITS: 100 INJECTION, SOLUTION INTRAVENOUS; SUBCUTANEOUS at 20:09

## 2023-06-23 RX ADMIN — PROPOFOL 75 MCG/KG/MIN: 10 INJECTION, EMULSION INTRAVENOUS at 20:13

## 2023-06-23 RX ADMIN — ALBUTEROL SULFATE 10 MG/HR: 2.5 SOLUTION RESPIRATORY (INHALATION) at 01:06

## 2023-06-23 RX ADMIN — INSULIN ASPART 1 UNITS: 100 INJECTION, SOLUTION INTRAVENOUS; SUBCUTANEOUS at 16:32

## 2023-06-23 RX ADMIN — METHYLPREDNISOLONE SODIUM SUCCINATE 125 MG: 125 INJECTION INTRAMUSCULAR; INTRAVENOUS at 08:32

## 2023-06-23 RX ADMIN — DILTIAZEM HYDROCHLORIDE 60 MG: 30 TABLET, FILM COATED ORAL at 23:38

## 2023-06-23 RX ADMIN — APIXABAN 5 MG: 5 TABLET, FILM COATED ORAL at 08:39

## 2023-06-23 RX ADMIN — PROPOFOL 75 MCG/KG/MIN: 10 INJECTION, EMULSION INTRAVENOUS at 13:56

## 2023-06-23 RX ADMIN — LEVALBUTEROL HYDROCHLORIDE 0.63 MG: 0.63 SOLUTION RESPIRATORY (INHALATION) at 15:44

## 2023-06-23 ASSESSMENT — ACTIVITIES OF DAILY LIVING (ADL)
ADLS_ACUITY_SCORE: 32
ADLS_ACUITY_SCORE: 36
ADLS_ACUITY_SCORE: 32
ADLS_ACUITY_SCORE: 29
ADLS_ACUITY_SCORE: 29
ADLS_ACUITY_SCORE: 32

## 2023-06-23 NOTE — PROVIDER NOTIFICATION
Telehub called and Bhupinder RN and spoken to regarding patient's HR. For the third time the patient HR briefly increases to the 130's it's a narrow QRS complex and appears to be a brief SVT run that converts back to SR in the 90's. Will continue to monitor.

## 2023-06-23 NOTE — PROGRESS NOTES
Intensivist brief update  DOS: 6/23/2023    Blood gas looks better this afternoon -- 7.34/61/143/33.   Tidal volumes closer to goal, though plateau and peak pressures remain elevated.  Continue current arrangement; we have room to go up on ketamine if needed.   Follow up on pulmonology recommendations; appreciate the assistance.    Jn Ovalles MD, PhD  Surgical critical care  June 23, 2023, 2:22 PM

## 2023-06-23 NOTE — PROGRESS NOTES
[facility] ICU VENTILATOR RESPIRATORY NOTE  Date of Admission: 6/20/23  Date of Intubation (most recent): 6/21/23  Reason for Mechanical Ventilation: Respiratory failure  Number of Days on Mechanical Ventilation: 3  Met Criteria for Pressure Support Trial: No  Reason for No Pressure Support Trial: Patient has high need for sedation  Vent Mode: PCV Plus assist  (Pressure Control Ventilation/ Assist Control)  FiO2 (%): 40 %  Resp Rate (Set): 10 breaths/min  Tidal Volume (Set, mL): 350 mL  PEEP (cm H2O): 12 cmH2O  Inspiratory Pressure (cm H2O) (Drager Norma): 55  Resp: 10    Significant Events Today: Spoke with [facility] about changing vent settings to PC with above settings. Some improvement noted with air-trapping but does continue to have. Albuterol neb continuous remains in-line.   ABG Results:   Recent Labs   Lab 06/22/23 2110 06/22/23 1902 06/22/23  0741 06/22/23  0518   PH 7.20*  --  7.33*  --    PCO2 81*  --  57*  --    PO2 95  --  189*  --    HCO3 32*  --  30*  --    O2PER 40 40 50 50     Venous Blood Gas  Recent Labs   Lab 06/22/23 2110 06/22/23  1902 06/22/23  0741 06/22/23  0518 06/21/23  1524 06/21/23  1159   PHV  --  7.16*  --  7.26* 7.36 7.28*   PCO2V  --  83*  --  69* 55* 59*   PO2V  --  59*  --  62* 59* 97*   HCO3V  --  30*  --  31* 31* 28   SUSANNAH  --  -1.1  --  2.0* 4.5* -0.3   O2PER 40 40 50 50 30 40     ETT appearance on chest x-ray: EXAM: XR CHEST PORT 1 VIEW  LOCATION: [facility]  DATE: 6/21/2023     INDICATION: check ETT position  COMPARISON: 06/21/2023                                                                      IMPRESSION: Endotracheal tube in good position 4 cm above the stevie. NG tube courses below the diaphragm. The lungs are clear.    Plan:  Continue to monitor    RT Dre on 6/23/2023 at 4:56 AM

## 2023-06-23 NOTE — PROVIDER NOTIFICATION
Teleb called and Bhupinder RN and Teleb provider spoken to regarding the Ketamine drip- as written the Ketamine drip is currently a titratable order to a RASS goal, but the indication for this patient is for bronchospasms. Wondering if we can switch the order to be one single dose/rate instead of written as a titratable order.    [facility] to start and keep the Ketamine infusion at the lowest dose/rate which is: 28.6 mg/hr (1.1 mL/hr).

## 2023-06-23 NOTE — PROGRESS NOTES
ICU staff  DOS 6/23/2023    Continued worsening overnight in terms of air trapping and airway pressures. Was put on PC mode for a while, switched back to VC later in the shift. This morning is getting a minute volume of 3-4 as compared to 6 last night, with Vt 270/rate 18. Peak pressures remain near 60 despite full-strength continuous albuterol, increased IV glucocorticoids, and addition of ketamine infusion. Disconnected patient from vent at my exam and tried to get full exhalation with external pressure on chest; no change to vent performance after this maneuver.    Vitals:   Temp:  [96.8  F (36  C)-98.5  F (36.9  C)] 97.5  F (36.4  C)  Pulse:  [] 96  Resp:  [9-41] 18  BP: ()/(47-68) 112/66  FiO2 (%):  [30 %-40 %] 40 %  SpO2:  [74 %-100 %] 94 %     Vent Mode: CMV/AC  (Continuous Mandatory Ventilation/ Assist Control)  FiO2 (%): 40 %  Resp Rate (Set): 18 breaths/min  Tidal Volume (Set, mL): 270 mL  PEEP (cm H2O): 5 cmH2O  Inspiratory Pressure (cm H2O) (Drager Norma): 55  Resp: 18    I/O last 3 completed shifts:  In: 3860.5 [I.V.:3420.5; NG/GT:320]  Out: 1254 [Urine:854; Emesis/NG output:400]    NE 0.03  Fent 200  Ketamine 28  Prop 75  Vec 0.5    Exam:  Gen: Intubated, sedated  HEENT: NC/AT  Pulm: Poor air movement, diffuse wheezing  Cor: RRR  Abdomen/GI: Soft, nondistended  : Hines in place  Extremities: Warm, nonedematous  Skin: Adequately perfused  Neuro: Sedated/paralyzed  Psych: Unable to assess    Data:   Labs reviewed  - 7.23/80/74/33 most recently, not really changed from overnight  - Lytes not really changed  - WBCs 14 (11)  Imaging personally reviewed  - CXR this morning shows clear, hyperinflated lungs  Nothing on micro    Assessment/plan:  56 y/o woman with severe, oxygen-dependent COPD admitted with exacerbation, acute on chronic hypercarbic respiratory failure, rapid atrial fibrillation. Continues to have significant bronchospasm and obstructive ventilation with air trapping despite  near-maximal support.   CNS: Intubated, sedated, chemically paralyzed.  # Acute pain  # Sedation  # Depression/anxiety  - Continue fentanyl, propofol  - Continue vecuronium for chest wall compliance  - Home paroxetine  Pulm: Worsening pulmonary function, but fairly stable blood gas overnight.  # Acute on chronic hypercarbic respiratory failure  # COPD, severe/oxygen-dependent  # COPD exacerbation  # ?Status asthmaticus  - Continue mechanical ventilation; not really any room to change parameters. PC mode with inspiratory pressure 55 produces ~200 mL Vt, as does AC at 270  - Permissive hypercarbia, will investigate dose of TEODORO for buffer  - Stopping continuous albuterol as it appeared to make no difference  - Continue ketamine infusion and neuromuscular blockade  - Continue steroids, albuterol, ipratropium  - ?Prone ventilation, inhaled pulmonary vasodilators, heliox -- have initiated transfer to [facility] for more advanced capabilities  - Will discuss VV ECMO candidacy with team as well, though given advanced pulmonary disease I suspect she will not be a candidate  - Pulmonology is following here as well  CV: Mildly hypotensive.  # Hypotension, multifactorial (sedation, NMB, intrathoracic pressures)  # Essential hypertension  # Paroxysmal atrial fibrillation  - Holding antihypertensives, on low-dose diltiazem for rate  - Continue statin  - Wean pressor as able  GI: Abdomen benign.  # Nutrition  - Is getting TF at 10/hr, may be prudent to hold in light of neuromuscular blockade, possible need to prone  : UOP adequate overall  # Elevated lactic acid  - Likely that albuterol contributing, appears adequately perfused  - Follow clinically for now  Heme: Hb 10.8 (11.2)  Msk: Extremities warm, well-perfused.   Endo: Glucose 169-202  # Hypothyroidism  # Stress/steroid hyperglycemia  - Continue insulin  - Levothyroxine  ID: Afebrile, WBCs 14  # COPD exacerbation  - Azithromycin  ICU: Apixiban, PPI  - Working on transfer of this  patient to [facility] for higher level of care. Accepted for a bed, awaiting discussion with team.    This patient is critically ill to my assessment and requires ICU monitoring and cares. A total of 60 minutes critical care time spent on 6/23/2023, exclusive of procedures.     Jn Ovalles MD, PhD  Surgical critical care  6/23/2023, 9:35 AM

## 2023-06-23 NOTE — PROGRESS NOTES
CLINICAL NUTRITION SERVICES - BRIEF NOTE    CURRENT NUTRITION SUPPORT    Access: NG tube (6/21)    Formula/Rate/Provisions: Promote at 10 ml/hr (240 ml) provides 240 kcal, 15 g protein, 31 g CHO, 6 g fat, 201 ml free H2O    Flushes: H2O- 30 ml q 4 hrs    Propofol at current rate provides 766 kcal/day    Total energy/protein provisions: 1006 kcal/15 g protein       NEW FINDINGS   Patient discussed during IDT rounds this morning. Medical team was considering transfer to [facility] for possible ECMO, but patient doesn't meet criteria so will remain here.     Labs:  Mg 2.8  P 5.8 (elevated, slightly down since yesterday)  Cr 0.97  WBC 14.7    Medications/infusions:      apixaban ANTICOAGULANT  5 mg Oral BID     artificial tears   Both Eyes Q8H     atorvastatin  20 mg Oral Daily     azithromycin  500 mg Intravenous Q24H     diltiazem  60 mg Oral or Feeding Tube Q6H ELZBIETA     insulin aspart  1-6 Units Subcutaneous Q4H     ipratropium  0.5 mg Nebulization 4x daily     levalbuterol  0.63 mg Nebulization 4x Daily     levothyroxine  112 mcg Oral Daily     methylPREDNISolone  125 mg Intravenous BID     multivitamins w/minerals  15 mL Per Feeding Tube Daily     nicotine  1 patch Transdermal Daily     nicotine   Transdermal Q8H     pantoprazole  40 mg Intravenous Q24H     PARoxetine  20 mg Oral Daily     sodium chloride (PF)  10-40 mL Intracatheter Q8H     sodium chloride (PF)  3 mL Intracatheter Q8H        albuterol (PROVENTIL) 40 mg in sodium chloride 0.9 % 32 mL continuous nebulization solution Stopped (06/23/23 0921)     fentaNYL 200 mcg/hr (06/23/23 1238)     ketamine 53.5 mg/hr (06/23/23 1200)     lactated ringers 100 mL/hr at 06/23/23 0800     propofol 75 mcg/kg/min (06/23/23 1200)    And     - MEDICATION INSTRUCTIONS -       norepinephrine 0.03 mcg/kg/min (06/23/23 1200)     - MEDICATION INSTRUCTIONS -       vecuronium (NORCURON) 1 mg/mL in D5W 50 mL 0.5 mcg/kg/min (06/23/23 1200)        ASSESSED NUTRITION NEEDS  Dose weight:  64.5 kg  Estimated Energy Needs: 6291-2932 kcals/day (20-25 kcal/kg)  Justification: Vented  Estimated Protein Needs:  grams protein/day (1.2 - 2 grams of pro/kg)  Justification: Hypercatabolism with critical illness  Estimated Fluid Needs: 1925 mL/day (30 mL/kg)   Justification: Maintenance    INTERVENTIONS  Implementation  Enteral Nutrition - Modify composition and rate to better meet needs - Vital HP @ 15 ml/hr (360 ml) which will provide 360 kcal, 31 g protein, 40 g CHO, 0 g fiber, 301 ml free H2O    + Prosource TF x 2 pkts/day= 160 kcal/40 g protein    Propofol provides 766 kcal at current rate    Total energy provisions, all sources= 1286 kcal (~20 kcal/kg)/71 g protein (1.1 g/kg) per DW 64.5 kg    Monitor Phos--new formula with significantly less P, but option to transition to renal formula is available if needed    Monitoring/Evaluation  Will continue to monitor and evaluate per protocol.    Danielle Molina, RASHAD, LD, [facility]  Pager - 3rd floor/ICU: [phone]  Pager - All other floors: [phone]  Pager - Weekend/holiday: [phone]  Office: [phone]

## 2023-06-23 NOTE — PLAN OF CARE
ICU End of Shift Summary.  For vital signs and complete assessments, please see documentation flowsheets.      Pertinent assessments: Continues on Assist control ventilation, Paralyzed with Vec goal of 2/4 twitches on TOF, Sedated with prop and Fent, RASS score -4/-5, VSS, Pt on Levo for MAP >65,  Tele: ACC junctional rhythm, Afebrile, LS: expiratory & inspiratory wheezes, Active BS- no stool on this shift, Hines intact decent UOP.    Major Shift Events:Art line placed, Vent setting adjustments, New TF & goal rate increased to 15 ml/HR.    Plan (Upcoming Events):  wean Vent, paralytics and  Sedation as able, Pulmonologist following.    Discharge/Transfer Needs: TBD     Bedside Shift Report Completed : Y  Bedside Safety Check Completed: Y    Goal Outcome Evaluation:      Plan of Care Reviewed With: spouse, family    Overall Patient Progress: no changeOverall Patient Progress: no change

## 2023-06-23 NOTE — PHARMACY
Pharmacy Tube Feeding Consult    Medication reviewed for administration by feeding tube and for potential food/drug interactions.    Recommendation: Recommend changing the following medications to a liquid dosage form: senokot-s, paxil, protonix    Pharmacy will continue to follow as new medications are ordered.

## 2023-06-23 NOTE — PROGRESS NOTES
Brief TELE ICU Note    Patient not getting volumes on vent - changed to PC overnight , PCO2 rising, ph responsive to intermittent HCO3 pushes.   Vent changes attempted with minimal improvement, noted Intrinsic PEEP at 25 with 12cm H20  (and 12 prescribed) . Waveform reviewed over camera.  Pt already sedate paralyzed on ketamine and continuous neb. Stat ABG and CXR ordered.   Trial  buffer with TEODORO (as opposed to HCO3 as contributes to CO2) but will need to d/w pharmacy as availability is limited.  D/w Indication for V-V ECMO given lack of response to current interventions. D/w with bedside attending and handed off care. ?    Jama Pappas MD  .

## 2023-06-23 NOTE — PROGRESS NOTES
Respiratory Therapy Note    An ABG was completed on the pt's right radial artery @ 0827 on an FIO2 of 40% via the ventilator with no complications noted during the procedure.    Stacey Monsivais, RT  8:27 AM June 23, 2023

## 2023-06-23 NOTE — PROGRESS NOTES
[facility]    Hospitalist Progress Note  Name: Lara Melendez    MRN: [id number]  Provider:  Kody Prieto DO, MPH  Date of Service: 06/23/2023    Summary of Stay: Lara Melendez is a 57 year old female with PMH including COPD, emphysema, as needed 2 L O2 at home, tobacco dependence, HTN, anxiety, paroxysmal A-fib on Eliquis, hypothyroidism, psoriasis, and HLD who presents with worsening shortness of breath and wheezing.      Problem List:   1. Acute hypoxic respiratory failure secondary to COPD exacerbation: Initially was on BiPAP following admission but she deteriorated so was intubated 6/21.  Continue ventilator settings per intensivist.  Currently on IV Solu-Medrol, continuous albuterol neb, vecuronium, ketamine and propofol.  Continue azithromycin.  Pulmonology consulted.  Dr. Ovalles discussed the case with [facility] on she is not an ECMO candidate.  2. Paroxysmal atrial fibrillation: Currently rate controlled.  Continue diltiazem and Eliquis for stroke prophylaxis.  3. Steroid-induced hyperglycemia: Continue medium sliding scale insulin.  4. Lactic acidosis: I suspect this is due to nebulizers and hypoxia.    5. Anxiety: Holding prior to admission clonazepam.  Continue paroxetine.  6. Hypertension: Hold prior to admission losartan.  7. Tobacco dependence: Nicotine replacement.  8. Malnutrition: RD consult for initiation of tube feeds.    DVT Prophylaxis: DOAC  Code Status: Full Code  Diet: NPO for Medical/Clinical Reasons Except for: Meds  Adult Formula Drip Feeding: Continuous Promote; Orogastric tube; Goal Rate: 10; mL/hr    Hines Catheter: PRESENT, indication: Deep Sedation/Paralysis  Disposition: Expected discharge in 3+ days to D. Goals prior to discharge include manage ICU issues.   Incidental Findings: As above.  Family updated today: Dr Ovalles updated family.    40 MINUTES SPENT BY ME on the date of service doing chart review, history, exam, documentation & further activities per  the note.      Interval History   Intubated, sedated.  Overnight events reviewed.  Discussed with intensivist, RT, and RN.    -Data reviewed today: I personally reviewed all new labs and imaging results over the last 24 hours.     Physical Exam   Temp: 97.5  F (36.4  C) Temp src: Axillary BP: 116/70 Pulse: 98   Resp: 18 SpO2: 97 % O2 Device: Mechanical Ventilator    Vitals:    06/21/23 0304 06/23/23 0345   Weight: 64.5 kg (142 lb 3.2 oz) 67.7 kg (149 lb 4 oz)     Vital Signs with Ranges  Temp:  [96.8  F (36  C)-98.5  F (36.9  C)] 97.5  F (36.4  C)  Pulse:  [] 98  Resp:  [9-41] 18  BP: ()/(47-70) 116/70  FiO2 (%):  [30 %-40 %] 40 %  SpO2:  [78 %-100 %] 97 %  I/O last 3 completed shifts:  In: 3860.5 [I.V.:3420.5; NG/GT:320]  Out: 1254 [Urine:854; Emesis/NG output:400]    GENERAL: No apparent distress. Intubated, sedated.  HEENT: Normocephalic, atraumatic.   CARDIOVASCULAR: Regular rate and rhythm without murmurs or rubs. No S3.  PULMONARY: Coarse wheezing bilaterally.  GASTROINTESTINAL: Soft, non-tender, non-distended. Bowel sounds normoactive.   EXTREMITIES: No cyanosis or clubbing. No edema.  NEUROLOGICAL: Sedated.  DERMATOLOGICAL: No rash, ulcer, bruising, nor jaundice.     Medications     albuterol (PROVENTIL) 40 mg in sodium chloride 0.9 % 32 mL continuous nebulization solution Stopped (06/23/23 0921)     fentaNYL 200 mcg/hr (06/23/23 1100)     ketamine 53.5 mg/hr (06/23/23 1100)     lactated ringers 100 mL/hr at 06/23/23 0800     propofol 75 mcg/kg/min (06/23/23 1100)    And     - MEDICATION INSTRUCTIONS -       norepinephrine 0.03 mcg/kg/min (06/23/23 1100)     - MEDICATION INSTRUCTIONS -       vecuronium (NORCURON) 1 mg/mL in D5W 50 mL 0.5 mcg/kg/min (06/23/23 1100)       apixaban ANTICOAGULANT  5 mg Oral BID     artificial tears   Both Eyes Q8H     atorvastatin  20 mg Oral Daily     azithromycin  500 mg Intravenous Q24H     diltiazem  60 mg Oral or Feeding Tube Q6H ELZBIETA     [Held by provider]  fluticasone-vilanterol  1 puff Inhalation Daily     insulin aspart  1-6 Units Subcutaneous Q4H     ipratropium  0.5 mg Nebulization 4x daily     levalbuterol  0.63 mg Nebulization 4x Daily     levothyroxine  112 mcg Oral Daily     methylPREDNISolone  125 mg Intravenous BID     multivitamins w/minerals  15 mL Per Feeding Tube Daily     nicotine  1 patch Transdermal Daily     nicotine   Transdermal Q8H     pantoprazole  40 mg Intravenous Q24H     PARoxetine  20 mg Oral Daily     sodium chloride (PF)  10-40 mL Intracatheter Q8H     sodium chloride (PF)  3 mL Intracatheter Q8H     Data     Laboratory:  Recent Labs   Lab 06/23/23  0550 06/22/23  2111 06/22/23  1456 06/22/23  0518 06/21/23  2051   WBC 14.2*  --   --  11.7* 10.1   HGB 10.8*  10.8* 11.2* 11.4* 11.9  11.9 12.5   HCT 35.1  --   --  36.4 38.8   *  --   --  103* 102*     --   --  185 226     Recent Labs   Lab 06/23/23  0744 06/23/23  0550 06/23/23  0353 06/22/23  1539 06/22/23  1458 06/22/23  0743 06/22/23  0518 06/21/23  0820 06/21/23  0622   NA  --  137  --   --   --   --  140  --  139   POTASSIUM  --  4.3  --   --  4.0  --  4.0  --  4.4   CHLORIDE  --  99  --   --   --   --  100  --  100   CO2  --  31*  --   --   --   --  28  --  26   ANIONGAP  --  7  --   --   --   --  12  --  13   * 174* 169*   < >  --    < > 204*   < > 151*   BUN  --  20.0  --   --   --   --  14.1  --  8.8   CR  --  0.97*  --   --   --   --  0.82  --  0.62   GFRESTIMATED  --  67  --   --   --   --  82  --  >90   EDIN  --  8.7  --   --   --   --  9.1  --  9.1    < > = values in this interval not displayed.     No results for input(s): CULT in the last 168 hours.    Imaging:  Recent Results (from the past 24 hour(s))   XR Chest Port 1 View    Narrative    CHEST ONE VIEW  6/23/2023 8:37 AM     HISTORY: Worsening ABGs and respiratory status on vent.    COMPARISON: June 21, 2023      Impression    IMPRESSION: Endotracheal tube tip 5 cm from the stevie. Right-sided  PICC  tip in the low SVC. Trace pleural fluid or pleural thickening on  the left. No acute infiltrates. Calcifications at the left apex  stable.    GHAZALA ORELLANA MD         SYSTEM ID:  [id number]         Ghazala Prieto DO MPH  Formerly [facility] Hospitalist  [address] [place] [address]  06/23/2023

## 2023-06-23 NOTE — PROVIDER NOTIFICATION
DATE/TIME OF CALL RECEIVED FROM LAB:  6/22 at 7:29 PM   LAB TEST:  PH and C02  LAB VALUE:  PH: 7.16 and CO2: 83  PROVIDER NOTIFIED: Yes, called by phone  PROVIDER NAME: [facility] ICU called and Bhupinder NEVES was spoken to  DATE/TIME LAB VALUE REPORTED TO PROVIDER: 7:30 PM  MECHANISM OF PROVIDER NOTIFICATION: Phone call to [facility]  PROVIDER RESPONSE: Change vent settings and recheck gas at 09:00 pm

## 2023-06-23 NOTE — PROGRESS NOTES
Respiratory Therapy Note    Critical access hospital ICU VENTILATOR RESPIRATORY NOTE  Date of Admission: 06/20/23  Date of Intubation (most recent): 06/21/23  Reason for Mechanical Ventilation: Respiratory Failure  Number of Days on Mechanical Ventilation: 3  Met Criteria for Pressure Support Trial: No  Reason for No Pressure Support Trial: Patient currently on paralytic   Significant Events Today: Patient's ventilator settings changed to CMV, PEEP increased to 8 cmH20 and RR increased to 20  ABG Results: 7.34/61/143/33    Plan:  Continue to monitor patient's respiratory status.    Vent Mode: CMV/AC  (Continuous Mandatory Ventilation/ Assist Control)  FiO2 (%): 40 %  Resp Rate (Set): (S) 20 breaths/min  Tidal Volume (Set, mL): 270 mL  PEEP (cm H2O): (S) 8 cmH2O  Inspiratory Pressure (cm H2O) (Drager Norma): 55  Resp: 18    RT to follow.    Stacey Monsivais, RT  5:30 PM June 23, 2023

## 2023-06-23 NOTE — PROGRESS NOTES
Intensivist brief update  DOS: 6/22/2023    Updates  - VBG results noted. Tele had been called and increased RR to 20. Repeat VBG ordered at 2300.  - Spoke with RN at 1800, no change in wheezing/bronchospasm/air trapping. Increased albuterol continuous to 15 mg. Add ketamine infusion.  - Increased steroids earlier today, due for another dose soon.  - Will sign out these changes to tele.    Jn Ovalles MD, PhD  Surgical critical care  June 22, 2023, 7:44 PM

## 2023-06-23 NOTE — PROGRESS NOTES
Intensivist brief update  DOS: 6/23/2023    Spoke with [facility] MICU staff and VV ECMO staff.  Based on her comorbidities (particuarly O2-dependent COPD), this patient would not be a candidate for VV ECMO.   Given that, will not transfer to [facility] as there is not significant additional resource available there.     Jn Ovalles MD, PhD  Surgical critical care  June 23, 2023, 9:52 AM    Addendum 10:57 AM  - Updated Ms Melendez's  and daughter (Danica)  - Verbal consent for arterial line from spouse  - Ketamine is at 0.5 mg/kg/hr --> going to increase it to 1. If no change (and no adverse effects), will increase to 2 later this afternoon.  RB

## 2023-06-23 NOTE — PROGRESS NOTES
An ABG was completed on the pt's R radial @ 2110 on an FIO2 of 40 with no complications noted during the procedure.    Kody Medina, RT on 6/22/2023 at 9:43 PM

## 2023-06-23 NOTE — PROGRESS NOTES
[facility] called and Bhupinder NEVES was spoken to regarding patient's norepi infusion order. Right now the order reads Levophed as a PERIPHERAL infusion- a PICC was placed yesterday and the Levophed is running through the PICC. Wondering if we can change the norepi order to reflect the venous access we are currently infusing it through.

## 2023-06-23 NOTE — PROGRESS NOTES
ICU End of Shift Summary.  For vital signs and complete assessments, please see documentation flowsheets.      Pertinent assessments:  CV: SR/ST 90-low 100's, 3 SVT runs overnight that would self-terminate-[facility] notified.   Neuro: RASS goal -4 to -5, patient currently paralyzed with Vec- twitch goal 2/4. Propofol and Fentanyl.   Pulm: Vent, continuous albuterol neb. 1 amp of bicarb given for Resp acidosis. Ketamine drip added for bronchospasms  Renal: Hines, adequate UO  Skin: No changes   ID: Azithromycin   Endo: q4h blood sugars with sliding scale, IV steroids      Bedside Shift Report Completed : Yes  Bedside Safety Check Completed: Yes

## 2023-06-23 NOTE — PROGRESS NOTES
[facility]    Pulmonary, Allergy, Critical Care and Sleep Medicine    Pulmonary Consult Progress Note    Lara Melendez MRN# [id number]   Age: 57 year old YOB: 1965     Date of Admission: 6/20/2023  Date of Service: 06/23/2023     ==================================================  INTERVAL EVENTS:   Now on NE  FiO2 slightly up to 40%  Significant ventilator dyssynchrony requiring increased sedation and ultimately pharmacologic paralysis    CHANGES FOR TODAY:   Increase PEEP to 8  Decrease inspiratory time back down to 0.7.  May need to decrease this even more to avoid air trapping    ==================================================    ASSESSMENT AND RECOMMENDATIONS:    ## COPD with exacerbation  She sees a pulmonologist at [facility], unfortunately I do not have access to her PFTs but per pulmonary notes her FEV1 is 51% with a DLCO of 60%.  From the chart it sounds like she has had frequent exacerbations.  Her home inhalers are Breo and it sounds like her primary pulmonologist recently tried to add a LAMA.  Breo is likely not the best option for her as it is a dry powder inhaler and she may not have the ability to fully inhale this.  I would recommend changing to aerosolized inhaler such as Breztri.  The RT COPD team can assist with this following extubation.  She requires 3 L supplemental oxygen at baseline which equates to an FiO2 of approximately 35%.  She is currently requiring 40% so is somewhat near her baseline.     -Recommend follow-up ABG to assess CO2 clearance on current ventilator settings  -Agree with completing azithromycin  -Agree with current steroid dose but consider rapid down titration if she improves     - Increase PEEP to 8  - Decrease inspiratory time back down to 0.7.  May need to decrease this even more to avoid air trapping.  -Given the complexity of her ventilator management, consider transferring to a facility that has 24/7 intensivist  "care       -Recommend RT COPD team consult following extubation  -Recommend changing home inhaler regimen from dry powder inhalers to aerosol (per outpatient pulmonary notes Spiriva has not been helpful in the past)  -Given the frequency of her exacerbations the addition of an antiexacerbation medication such as Roflumilast or chronic azithromycin may be warranted at discharge            Kain Bush M.D.  Pulmonary & Critical Care  Pager: Click Here to page    Pulmonary will continue to follow. We are in house at [facility] on Monday, Wednesday, and Friday. For assistance on other days, please page the on-call pulmonologist through [facility] or the .      I spent 45 minutes dedicated to his care so far today excluding procedures, including review of medical records, review of imaging (results & images), time with patient and time in documentation.      ==================================================      PHYSICAL EXAM  /70   Pulse 105   Temp 96.9  F (36.1  C) (Axillary)   Resp 18   Ht 1.6 m (5' 3\")   Wt 67.7 kg (149 lb 4 oz)   SpO2 98%   BMI 26.44 kg/m        Intake/Output Summary (Last 24 hours) at 6/23/2023 1437  Last data filed at 6/23/2023 1400  Gross per 24 hour   Intake 2875.05 ml   Output 1015 ml   Net 1860.05 ml       Vitals:    06/21/23 0304 06/23/23 0345   Weight: 64.5 kg (142 lb 3.2 oz) 67.7 kg (149 lb 4 oz)         General: Sedated, intubated  Resp: Slight wheeze on the left, prominent wheeze on the right.  Cardiac: RRR, NS1,S2, No m/r/g  Abdomen: Soft, hypoactive bowel sounds  Extremities: Trace lower extremity edema  Skin: Warm and dry, no jaundice or rash        Recent Labs   Lab Test 06/23/23  1351 06/23/23  0550 06/22/23  2111 06/22/23  1456 06/22/23  0518 06/21/23  2051   WBC  --  14.2*  --   --  11.7* 10.1   RBC  --  3.28*  --   --  3.55* 3.79*   HGB 10.7* 10.8*  10.8* 11.2*   < > 11.9  11.9 12.5   PLT  --  205  --   --  185 226    < > = values in this interval not " displayed.       Recent Labs   Lab Test 06/23/23  1210 06/23/23  0744 06/23/23  0550 06/22/23  1539 06/22/23  1458 06/22/23  0743 06/22/23  0518 06/21/23  0820 06/21/23  0622   NA  --   --  137  --   --   --  140  --  139   POTASSIUM  --   --  4.3  --  4.0  --  4.0  --  4.4   CHLORIDE  --   --  99  --   --   --  100  --  100   CO2  --   --  31*  --   --   --  28  --  26   BUN  --   --  20.0  --   --   --  14.1  --  8.8   CR  --   --  0.97*  --   --   --  0.82  --  0.62   * 169* 174*   < >  --    < > 204*   < > 151*   EDIN  --   --  8.7  --   --   --  9.1  --  9.1   MAG  --   --  2.8*  --  2.4*  --   --   --  2.1    < > = values in this interval not displayed.           No results for input(s): CULT in the last 168 hours.      Recent Results (from the past 48 hour(s))   XR Chest Port 1 View    Narrative    EXAM: XR CHEST PORT 1 VIEW  LOCATION: [facility]  DATE: 6/21/2023    INDICATION: check ETT position  COMPARISON: 06/21/2023      Impression    IMPRESSION: Endotracheal tube in good position 4 cm above the stevie. NG tube courses below the diaphragm. The lungs are clear.   XR Chest Port 1 View    Narrative    CHEST ONE VIEW  6/23/2023 8:37 AM     HISTORY: Worsening ABGs and respiratory status on vent.    COMPARISON: June 21, 2023      Impression    IMPRESSION: Endotracheal tube tip 5 cm from the stevie. Right-sided  PICC tip in the low SVC. Trace pleural fluid or pleural thickening on  the left. No acute infiltrates. Calcifications at the left apex  stable.    GHAZALA ORELLANA MD         SYSTEM ID:  [id number]

## 2023-06-23 NOTE — PROCEDURES
Operative note    Preoperative diagnosis: Acute hypercarbic respiratory failure  Postoperative diagnosis: Same  Procedure: Insert arterial line  Surgeon: Josr  Anesthesia: IV sedation  EBL: Minimal  Drains: None  Complications: None apparent    Findings: Left radial arterial catheter successfully placed under ultrasound guidance    Indication for procedure: Need for multiple blood gas draws in patient with COPD exacerbation and acute on chronic hypercarbic respiratory failure    Description of procedure:   Ms Melendez is a 58 y/o woman with COPD who was admitted and intubated for acute on chronic hypercarbic respiratory failure. She has had worsening ventilation over the last 24 hours and we are frequently checking ABGs for pH and PaCO2. Arterial line is indicated. I explained the procedure as well as the risks/benefits to her , who consented to proceed.    After sterile prep/drape of the left forearm/wrist, I identified the left radial artery under ultrasound guidance. I cannulated it without difficulty. I passed the flexible guidewire and using modified Seldinger technique placed the arterial catheter. There was arterial bleed-back. I attached it to the manometer and there was good arterial waveform. I sutured it in place with 3-0 silk suture and with RN assistance applied sterile dressings.    The patient tolerated the procedure well and without apparent complication.    Jn Ovalles MD, PhD  Surgical critical care  6/23/2023, 12:00 PM

## 2023-06-24 LAB
ALLEN'S TEST: ABNORMAL
ANION GAP SERPL CALCULATED.3IONS-SCNC: 5 MMOL/L (ref 7–15)
BASE EXCESS BLDA CALC-SCNC: 4 MMOL/L (ref -9–1.8)
BASE EXCESS BLDA CALC-SCNC: 4.4 MMOL/L (ref -9–1.8)
BASE EXCESS BLDA CALC-SCNC: 5.6 MMOL/L (ref -9–1.8)
BUN SERPL-MCNC: 39.1 MG/DL (ref 8–23)
CALCIUM SERPL-MCNC: 8.5 MG/DL (ref 8.6–10)
CHLORIDE SERPL-SCNC: 101 MMOL/L (ref 98–107)
CREAT SERPL-MCNC: 0.97 MG/DL (ref 0.51–0.95)
DEPRECATED HCO3 PLAS-SCNC: 31 MMOL/L (ref 22–29)
ERYTHROCYTE [DISTWIDTH] IN BLOOD BY AUTOMATED COUNT: 13.1 % (ref 10–15)
GFR SERPL CREATININE-BSD FRML MDRD: 67 ML/MIN/1.73M2
GLUCOSE BLDC GLUCOMTR-MCNC: 144 MG/DL (ref 70–99)
GLUCOSE BLDC GLUCOMTR-MCNC: 146 MG/DL (ref 70–99)
GLUCOSE BLDC GLUCOMTR-MCNC: 147 MG/DL (ref 70–99)
GLUCOSE BLDC GLUCOMTR-MCNC: 155 MG/DL (ref 70–99)
GLUCOSE BLDC GLUCOMTR-MCNC: 158 MG/DL (ref 70–99)
GLUCOSE BLDC GLUCOMTR-MCNC: 159 MG/DL (ref 70–99)
GLUCOSE SERPL-MCNC: 171 MG/DL (ref 70–99)
HCO3 BLD-SCNC: 33 MMOL/L (ref 21–28)
HCO3 BLD-SCNC: 33 MMOL/L (ref 21–28)
HCO3 BLD-SCNC: 34 MMOL/L (ref 21–28)
HCT VFR BLD AUTO: 30.9 % (ref 35–47)
HGB BLD-MCNC: 9.8 G/DL (ref 11.7–15.7)
MAGNESIUM SERPL-MCNC: 2.6 MG/DL (ref 1.7–2.3)
MCH RBC QN AUTO: 33.3 PG (ref 26.5–33)
MCHC RBC AUTO-ENTMCNC: 31.7 G/DL (ref 31.5–36.5)
MCV RBC AUTO: 105 FL (ref 78–100)
O2/TOTAL GAS SETTING VFR VENT: 40 %
PCO2 BLD: 71 MM HG (ref 35–45)
PCO2 BLD: 71 MM HG (ref 35–45)
PCO2 BLD: 72 MM HG (ref 35–45)
PH BLD: 7.26 [PH] (ref 7.35–7.45)
PH BLD: 7.27 [PH] (ref 7.35–7.45)
PH BLD: 7.29 [PH] (ref 7.35–7.45)
PHOSPHATE SERPL-MCNC: 5.2 MG/DL (ref 2.5–4.5)
PLATELET # BLD AUTO: 171 10E3/UL (ref 150–450)
PO2 BLD: 74 MM HG (ref 80–105)
PO2 BLD: 91 MM HG (ref 80–105)
PO2 BLD: 98 MM HG (ref 80–105)
POTASSIUM SERPL-SCNC: 5.5 MMOL/L (ref 3.4–5.3)
RBC # BLD AUTO: 2.94 10E6/UL (ref 3.8–5.2)
SODIUM SERPL-SCNC: 137 MMOL/L (ref 136–145)
WBC # BLD AUTO: 12.8 10E3/UL (ref 4–11)

## 2023-06-24 PROCEDURE — 80048 BASIC METABOLIC PNL TOTAL CA: CPT | Performed by: INTERNAL MEDICINE

## 2023-06-24 PROCEDURE — 250N000009 HC RX 250: Performed by: INTERNAL MEDICINE

## 2023-06-24 PROCEDURE — 250N000013 HC RX MED GY IP 250 OP 250 PS 637: Performed by: HOSPITALIST

## 2023-06-24 PROCEDURE — 82803 BLOOD GASES ANY COMBINATION: CPT | Performed by: SURGERY

## 2023-06-24 PROCEDURE — 999N000157 HC STATISTIC RCP TIME EA 10 MIN

## 2023-06-24 PROCEDURE — 99291 CRITICAL CARE FIRST HOUR: CPT | Performed by: SURGERY

## 2023-06-24 PROCEDURE — 84100 ASSAY OF PHOSPHORUS: CPT | Performed by: HOSPITALIST

## 2023-06-24 PROCEDURE — 250N000011 HC RX IP 250 OP 636: Performed by: SURGERY

## 2023-06-24 PROCEDURE — 250N000011 HC RX IP 250 OP 636: Performed by: HOSPITALIST

## 2023-06-24 PROCEDURE — 85027 COMPLETE CBC AUTOMATED: CPT | Performed by: INTERNAL MEDICINE

## 2023-06-24 PROCEDURE — 250N000013 HC RX MED GY IP 250 OP 250 PS 637: Performed by: INTERNAL MEDICINE

## 2023-06-24 PROCEDURE — 94640 AIRWAY INHALATION TREATMENT: CPT | Mod: 76

## 2023-06-24 PROCEDURE — 83735 ASSAY OF MAGNESIUM: CPT | Performed by: HOSPITALIST

## 2023-06-24 PROCEDURE — 250N000011 HC RX IP 250 OP 636: Performed by: INTERNAL MEDICINE

## 2023-06-24 PROCEDURE — 99232 SBSQ HOSP IP/OBS MODERATE 35: CPT | Performed by: HOSPITALIST

## 2023-06-24 PROCEDURE — 94003 VENT MGMT INPAT SUBQ DAY: CPT

## 2023-06-24 PROCEDURE — 94640 AIRWAY INHALATION TREATMENT: CPT

## 2023-06-24 PROCEDURE — 258N000003 HC RX IP 258 OP 636: Performed by: INTERNAL MEDICINE

## 2023-06-24 PROCEDURE — 258N000003 HC RX IP 258 OP 636: Performed by: HOSPITALIST

## 2023-06-24 PROCEDURE — 250N000011 HC RX IP 250 OP 636: Mod: JZ | Performed by: INTERNAL MEDICINE

## 2023-06-24 PROCEDURE — 200N000001 HC R&B ICU

## 2023-06-24 PROCEDURE — 84132 ASSAY OF SERUM POTASSIUM: CPT | Performed by: HOSPITALIST

## 2023-06-24 RX ORDER — FUROSEMIDE 10 MG/ML
20 INJECTION INTRAMUSCULAR; INTRAVENOUS ONCE
Status: COMPLETED | OUTPATIENT
Start: 2023-06-24 | End: 2023-06-24

## 2023-06-24 RX ORDER — CALCIUM GLUCONATE 20 MG/ML
1 INJECTION, SOLUTION INTRAVENOUS ONCE
Status: COMPLETED | OUTPATIENT
Start: 2023-06-24 | End: 2023-06-24

## 2023-06-24 RX ADMIN — INSULIN ASPART 1 UNITS: 100 INJECTION, SOLUTION INTRAVENOUS; SUBCUTANEOUS at 16:10

## 2023-06-24 RX ADMIN — METHYLPREDNISOLONE SODIUM SUCCINATE 125 MG: 125 INJECTION INTRAMUSCULAR; INTRAVENOUS at 20:24

## 2023-06-24 RX ADMIN — DILTIAZEM HYDROCHLORIDE 60 MG: 30 TABLET, FILM COATED ORAL at 06:16

## 2023-06-24 RX ADMIN — PROPOFOL 65 MCG/KG/MIN: 10 INJECTION, EMULSION INTRAVENOUS at 06:15

## 2023-06-24 RX ADMIN — PROPOFOL 55 MCG/KG/MIN: 10 INJECTION, EMULSION INTRAVENOUS at 10:26

## 2023-06-24 RX ADMIN — DILTIAZEM HYDROCHLORIDE 60 MG: 30 TABLET, FILM COATED ORAL at 12:10

## 2023-06-24 RX ADMIN — LEVOTHYROXINE SODIUM 112 MCG: 0.11 TABLET ORAL at 08:17

## 2023-06-24 RX ADMIN — MINERAL OIL AND WHITE PETROLATUM: 150; 830 OINTMENT OPHTHALMIC at 23:40

## 2023-06-24 RX ADMIN — DILTIAZEM HYDROCHLORIDE 60 MG: 30 TABLET, FILM COATED ORAL at 18:03

## 2023-06-24 RX ADMIN — SODIUM CHLORIDE, POTASSIUM CHLORIDE, SODIUM LACTATE AND CALCIUM CHLORIDE: 600; 310; 30; 20 INJECTION, SOLUTION INTRAVENOUS at 10:26

## 2023-06-24 RX ADMIN — IPRATROPIUM BROMIDE 0.5 MG: 0.5 SOLUTION RESPIRATORY (INHALATION) at 11:36

## 2023-06-24 RX ADMIN — LEVALBUTEROL HYDROCHLORIDE 0.63 MG: 0.63 SOLUTION RESPIRATORY (INHALATION) at 15:44

## 2023-06-24 RX ADMIN — INSULIN ASPART 1 UNITS: 100 INJECTION, SOLUTION INTRAVENOUS; SUBCUTANEOUS at 12:10

## 2023-06-24 RX ADMIN — LEVALBUTEROL HYDROCHLORIDE 0.63 MG: 0.63 SOLUTION RESPIRATORY (INHALATION) at 07:58

## 2023-06-24 RX ADMIN — APIXABAN 5 MG: 5 TABLET, FILM COATED ORAL at 08:17

## 2023-06-24 RX ADMIN — IPRATROPIUM BROMIDE 0.5 MG: 0.5 SOLUTION RESPIRATORY (INHALATION) at 19:33

## 2023-06-24 RX ADMIN — APIXABAN 5 MG: 5 TABLET, FILM COATED ORAL at 20:40

## 2023-06-24 RX ADMIN — PAROXETINE 20 MG: 10 SUSPENSION ORAL at 08:19

## 2023-06-24 RX ADMIN — LEVALBUTEROL HYDROCHLORIDE 0.63 MG: 0.63 SOLUTION RESPIRATORY (INHALATION) at 11:36

## 2023-06-24 RX ADMIN — FUROSEMIDE 20 MG: 10 INJECTION, SOLUTION INTRAMUSCULAR; INTRAVENOUS at 04:57

## 2023-06-24 RX ADMIN — Medication 15 ML: at 08:18

## 2023-06-24 RX ADMIN — IPRATROPIUM BROMIDE 0.5 MG: 0.5 SOLUTION RESPIRATORY (INHALATION) at 15:44

## 2023-06-24 RX ADMIN — PROPOFOL 75 MCG/KG/MIN: 10 INJECTION, EMULSION INTRAVENOUS at 03:20

## 2023-06-24 RX ADMIN — INSULIN ASPART 1 UNITS: 100 INJECTION, SOLUTION INTRAVENOUS; SUBCUTANEOUS at 08:18

## 2023-06-24 RX ADMIN — PROPOFOL 45 MCG/KG/MIN: 10 INJECTION, EMULSION INTRAVENOUS at 15:31

## 2023-06-24 RX ADMIN — POLYETHYLENE GLYCOL 3350 17 G: 17 POWDER, FOR SOLUTION ORAL at 15:29

## 2023-06-24 RX ADMIN — IPRATROPIUM BROMIDE 0.5 MG: 0.5 SOLUTION RESPIRATORY (INHALATION) at 07:58

## 2023-06-24 RX ADMIN — METHYLPREDNISOLONE SODIUM SUCCINATE 125 MG: 125 INJECTION INTRAMUSCULAR; INTRAVENOUS at 08:18

## 2023-06-24 RX ADMIN — MINERAL OIL AND WHITE PETROLATUM: 150; 830 OINTMENT OPHTHALMIC at 15:40

## 2023-06-24 RX ADMIN — INSULIN ASPART 1 UNITS: 100 INJECTION, SOLUTION INTRAVENOUS; SUBCUTANEOUS at 20:25

## 2023-06-24 RX ADMIN — VECURONIUM BROMIDE 0.8 MCG/KG/MIN: 1 INJECTION, POWDER, LYOPHILIZED, FOR SOLUTION INTRAVENOUS at 06:14

## 2023-06-24 RX ADMIN — SENNOSIDES 5 ML: 8.8 LIQUID ORAL at 08:17

## 2023-06-24 RX ADMIN — LEVALBUTEROL HYDROCHLORIDE 0.63 MG: 0.63 SOLUTION RESPIRATORY (INHALATION) at 19:33

## 2023-06-24 RX ADMIN — MINERAL OIL AND WHITE PETROLATUM: 150; 830 OINTMENT OPHTHALMIC at 08:18

## 2023-06-24 RX ADMIN — SODIUM ZIRCONIUM CYCLOSILICATE 10 G: 10 POWDER, FOR SUSPENSION ORAL at 23:36

## 2023-06-24 RX ADMIN — NICOTINE 1 PATCH: 14 PATCH, EXTENDED RELEASE TRANSDERMAL at 08:17

## 2023-06-24 RX ADMIN — INSULIN ASPART 1 UNITS: 100 INJECTION, SOLUTION INTRAVENOUS; SUBCUTANEOUS at 04:05

## 2023-06-24 RX ADMIN — SODIUM ZIRCONIUM CYCLOSILICATE 10 G: 10 POWDER, FOR SUSPENSION ORAL at 16:09

## 2023-06-24 RX ADMIN — ATORVASTATIN CALCIUM 20 MG: 20 TABLET, FILM COATED ORAL at 08:17

## 2023-06-24 RX ADMIN — Medication 200 MCG/HR: at 13:58

## 2023-06-24 RX ADMIN — SODIUM CHLORIDE, POTASSIUM CHLORIDE, SODIUM LACTATE AND CALCIUM CHLORIDE: 600; 310; 30; 20 INJECTION, SOLUTION INTRAVENOUS at 20:51

## 2023-06-24 RX ADMIN — SODIUM ZIRCONIUM CYCLOSILICATE 10 G: 10 POWDER, FOR SUSPENSION ORAL at 10:04

## 2023-06-24 RX ADMIN — CALCIUM GLUCONATE 1 G: 20 INJECTION, SOLUTION INTRAVENOUS at 04:54

## 2023-06-24 RX ADMIN — Medication 200 MCG/HR: at 01:32

## 2023-06-24 RX ADMIN — PROPOFOL 35 MCG/KG/MIN: 10 INJECTION, EMULSION INTRAVENOUS at 20:50

## 2023-06-24 RX ADMIN — VECURONIUM BROMIDE 0.9 MCG/KG/MIN: 1 INJECTION, POWDER, LYOPHILIZED, FOR SOLUTION INTRAVENOUS at 14:40

## 2023-06-24 RX ADMIN — Medication 40 MG: at 08:19

## 2023-06-24 RX ADMIN — AZITHROMYCIN MONOHYDRATE 500 MG: 500 INJECTION, POWDER, LYOPHILIZED, FOR SOLUTION INTRAVENOUS at 10:39

## 2023-06-24 ASSESSMENT — ACTIVITIES OF DAILY LIVING (ADL)
ADLS_ACUITY_SCORE: 32
ADLS_ACUITY_SCORE: 36
ADLS_ACUITY_SCORE: 36
ADLS_ACUITY_SCORE: 32
ADLS_ACUITY_SCORE: 32
ADLS_ACUITY_SCORE: 36
ADLS_ACUITY_SCORE: 32
ADLS_ACUITY_SCORE: 36
ADLS_ACUITY_SCORE: 32
ADLS_ACUITY_SCORE: 36

## 2023-06-24 NOTE — PROGRESS NOTES
Brief note:    ABG reviewed - stable from prior. No adjustments made at this time.     Cathryn Price MD  Pulmonary, Allergy, Critical Care, and Sleep Medicine   [facility]   Pager: [phone]

## 2023-06-24 NOTE — PROGRESS NOTES
Patient with K 5.5.     Given Ca gluconate 1 g and 20 mg IV lasix x 1. Per I/Os, + 6.7 L since admission. Weight is up ~ 3 kg. Repeat K later in am.     Cathryn Price MD  Pulmonary, Allergy, Critical Care, and Sleep Medicine   [facility]   Pager: [phone]

## 2023-06-24 NOTE — PROGRESS NOTES
[facility] ICU VENTILATOR RESPIRATORY NOTE    Date of Admission: 6/20/23  Date of Intubation (most recent): 6/21/23  Reason for Mechanical Ventilation: Respiratory failure  Number of Days on Mechanical Ventilation: 4  Met Criteria for Pressure Support Trial: No  Reason for No Pressure Support Trial: On paralytic    Event today:  Patient PIP jumps from the high 30's to the mid to high 50's within minutes after the neb is started.  Dr. Ovalles aware and advised to continue nebs as PIP is lower overall today.    Vent Mode: CMV/AC  (Continuous Mandatory Ventilation/ Assist Control)  FiO2 (%): 40 %  Resp Rate (Set): 20 breaths/min  Tidal Volume (Set, mL): 270 mL  PEEP (cm H2O): 8 cmH2O  Inspiratory Pressure (cm H2O) (Drager Norma): 40  Resp: 20    Recent Labs   Lab 06/24/23  0406 06/23/23  2356 06/23/23  1757 06/23/23  1352   PH 7.27* 7.26* 7.28* 7.34*   PCO2 71* 72* 71* 61*   PO2 98 91 96 143*   HCO3 33* 33* 33* 33*   O2PER 40 40 40 40     Temp: 97  F (36.1  C) Temp src: Axillary BP: 121/53 Pulse: 86   Resp: 20 SpO2: 93 % O2 Device: Mechanical Ventilator       Plan: Continue to monitor. Wean as tolerated.

## 2023-06-24 NOTE — PROGRESS NOTES
Shift summary [id number]    Cards: SR w/ inverted T wave. Occasional PACs. BP WNL off pressor. Pulses palpable. Weak in BLE. Trace edema in BUE and BLE.     Resp: Vent AC. FiO2 40%, RR 20, Vt 270, PEEP 8. High PIP pressures after neb, slowly trending down. Up to 57. LS coarse, inspiratory and expiratory wheezes, squeeks. Minimal secretions.     Neuro: Sedated/paralyzed. PERRLA, sluggish. RAAS -5. BIS goal 40-60 (clarified in rounds), Currently in mid 30s. TOF 2/4.    Skin: Bruises. q2h turns.     GI: BS active. Abd rounded/distended. Trickle TF. VHP. Minimal residuals. 30cc this AM. Started Senna. No BM in 4 days, constipated.     : nikko, patent. Adequate output.     K 5.5 Lokelma given.

## 2023-06-24 NOTE — PROGRESS NOTES
Notified provider about indwelling el catheter discussed removal or continued need.    Did provider choose to remove indwelling el catheter? NO    Provider's el indication for keeping indwelling el catheter: Indication for continued use: Deep Sedation/Paralysis    Is there an order for indwelling el catheter? YES    *If there is a plan to keep el catheter in place at discharge daily notification with provider is not necessary, but please add a notation in the treatment team sticky note that the patient will be discharging with the catheter.

## 2023-06-24 NOTE — PROGRESS NOTES
ICU staff  DOS 6/24/2023    Improved airway pressures overnight, with peak pressures now in the 30s (though they bump up into the 50s during neb therapy). Hyperkalemic on morning labs, got calcium and furosemide.     Vitals:   Temp:  [96.9  F (36.1  C)-99.2  F (37.3  C)] 99.2  F (37.3  C)  Pulse:  [] 92  Resp:  [10-26] 20  BP: (110-124)/(53-70) 121/53  MAP:  [65 mmHg-84 mmHg] 81 mmHg  Arterial Line BP: ()/(17-69) 126/58  FiO2 (%):  [40 %] 40 %  SpO2:  [88 %-99 %] 96 %     Vent Mode: CMV/AC  (Continuous Mandatory Ventilation/ Assist Control)  FiO2 (%): 40 %  Resp Rate (Set): 20 breaths/min  Tidal Volume (Set, mL): 270 mL  PEEP (cm H2O): 8 cmH2O  Inspiratory Pressure (cm H2O) (Drager Norma): 55  Resp: 20  Peaks 58 -> 38-39  Plateau 36 -> 25-28    I/O last 3 completed shifts:  In: 4302.2 [I.V.:3422.2; NG/GT:570]  Out: 1655 [Urine:1655]    NE off  Fentanyl 200  Ketamine 53  Propofol 60  Vec 0.8    Exam:  Gen: Intubated, sedated, chemically paralyzed  HEENT: NC/AT  Pulm: Coarse/wheezing, but better air movement today  Cor: RRR  Abdomen/GI: Distended  : Hines in place  Extremities: Warm, somewhat edematous  Skin: Well-perfused  Neuro: Sedated  Psych: Unable to assess    Data:   Labs reveiwed  - K 5.5 this morning, repeat unchanged  - 7.27/70/98/33, not changed overnight  No new imaging or cultures    Assessment/plan:  56 y/o woman with severe, oxygen-dependent COPD who was admitted 6/21 with acute on chronic mixed respiratory failure and a COPD exacerbation. Intubated for work of breathing 6/21. Has been extremely difficult to ventilate despite maximal therapeutic measures, though overnight peak pressures began to come down and minute volumes improved.   CNS: Intubated, sedated, chemically paralyzed.  # Anxiety/depression  # Pain  # Sedation  - Continue fentanyl, propofol  - Continue neuromuscular blockade for respiratory compliance  - Paroxetine per home dose  Pulm: Respiratory mechanics slightly improved  this morning, with lower airway pressures (peaks now down into the 30s sometimes) and better air movement on examination.   # Acute on chronic hypercarbic respiratory failure  # Severe, oxygen-dependent COPD  # COPD exacerbation  # ?Status asthmaticus  - Continue mechanical ventilation at current parameters  - Will recheck ABG later today  - Continue ketamine at 1 mg/kg/hr for bronchospasm, room to go up to 2  - Continue methylprednisolone, if she continues to show improvement tomorrow will taper back to 62.5 mg  - Continue paralytic today, hope to wean off tomorrow if she continues to show progress  - Continue ipratropium, albuterol, other supportive cares  - No need to buffer pH at the moment  CV: Off pressors this morning.  # Hypotension, multifactorial  # Essential hypertension  # Paroxysmal atrial fibrillation  - BP improved as intrathoracic pressures are coming down  - Continue statin, apixiban  GI: Abdomen slightly distended, residuals low.  # Nutrition  - Getting trophic TF, OK to continue for now but if residuals increase sharply will hold  : UOP adequate. K elevated.  # Hyperkalemia, unclear etiology  - Got furosemide/calcium last night without much change  - Lokelma now  - Will back off on IVF a bit today, if things look similar tomorrow may start some gentle diuresis  Heme: Hb 9.8 (10.7)  # Anemia of critical illness  - no indication to transfuse  Msk: Extremities warm, well-perfused.   Endo: Glucose 144-171.  # Stress/steroid hyperglycemia  # Hypothyroid  - Continue insulin prn  - Levothyroxine  ID: Afebrile, WBCs slowly drifting down.  # COPD exacerbation  - Complete course of azithromycin  ICU: Apixiban, PPI.  - Deferred transfer as patient is not a candidate for extracorporeal support, and there are no significant additional measures that can be offered at [facility]    This patient is critically ill to my assessment and requires ICU monitoring and cares. A total of 40 minutes critical care time spent on  6/24/2023, exclusive of procedures.     Jn Ovalles MD, PhD  Surgical critical care  6/24/2023, 10:46 AM

## 2023-06-24 NOTE — PLAN OF CARE
ICU End of Shift Summary.  For vital signs and complete assessments, please see documentation flowsheets.      Pertinent assessments: Patient afebrile. Remains on full vent support and medically paralyzed. ABG's back with little change, tele hub called with results both times and no new orders received. LS diminished with exp wheezes, little secretions suctioned overnight. Tele mostly SR with rates in 90's with one bout up to 120's then resolved.  K back this am at 5.5 updated tele hub with rising K, Lasix and calcium gluconate given. Urine output good overnight. No BM's. Abdomen remains taut with active bowel sounds. Residuals 30,30, and 10. Sedation titrated down this am with lower BIS scores in upper 20's now upper 30's- low 40's. Vec increased to achieve 2/4 twitches.   Major Shift Events:   -Bath completed  -Vec increased to achieve 2/4 twitches  -Prop decreased this am for BIS scores in upper 20's  -K 5.5 calcium gluconate and lasix given  -Levo titrated off, remained off since 0130     Plan (Upcoming Events): Recheck K, Continue Abx.   Discharge/Transfer Needs: To be determined pending progress     Bedside Shift Report Completed : Y  Bedside Safety Check Completed: Y    Goal Outcome Evaluation:      Plan of Care Reviewed With: patient    Overall Patient Progress: no changeOverall Patient Progress: no change

## 2023-06-24 NOTE — PLAN OF CARE
ICU SHIFT NOTE [id number]     Pertinent assessments:  RASS -5  Coarse, wheezy lungs  Tele: SR w/ occ SVT and inverted T's  Residuals 6 and 30  Distended abd      Major shift events:   and daughter updated via phone  Repeat K+ still 5.5, MD aware, ordering more lokelma and recheck at 2200  No BM, stool sofeners given  ABG done  Elevated PIP w/ nebs  Decreased sedation as able    Bedside handoff: Y  Discharge plan: TBD

## 2023-06-24 NOTE — PROGRESS NOTES
[facility]    Hospitalist Progress Note  Name: Lara Melendez    MRN: [id number]  Provider:  Kody Prieto DO, MPH  Date of Service: 06/24/2023    Summary of Stay: Lara Melendez is a 57 year old female with PMH including COPD, emphysema, as needed 2 L O2 at home, tobacco dependence, HTN, anxiety, paroxysmal A-fib on Eliquis, hypothyroidism, psoriasis, and HLD who presents with worsening shortness of breath and wheezing.      Problem List:   1. Acute hypoxic respiratory failure secondary to COPD exacerbation: Initially was on BiPAP following admission but she deteriorated so was intubated 6/21.  Continue ventilator settings per intensivist.  Currently on IV Solu-Medrol, continuous albuterol neb, vecuronium, ketamine and propofol.  Now off norepinephrine.  Continue azithromycin.  Pulmonology consulted and vent adjustments made.  Dr. Ovalles discussed the case with [facility] on she is not an ECMO candidate.  2. Paroxysmal atrial fibrillation: Currently rate controlled.  Continue diltiazem and Eliquis for stroke prophylaxis.  3. Hyperkalemia: Potassium 5.5 this morning.  Give a dose of Lokelma and recheck this afternoon.  4. Steroid-induced hyperglycemia: Continue medium sliding scale insulin.  5. Lactic acidosis: I suspect this is due to nebulizers and hypoxia.    6. Anxiety: Holding prior to admission clonazepam.  Continue paroxetine.  7. Hypertension: Hold prior to admission losartan.  8. Tobacco dependence: Nicotine replacement.  9. Malnutrition: RD consult for of tube feeds.    DVT Prophylaxis: DOAC  Code Status: Full Code  Diet: NPO for Medical/Clinical Reasons Except for: Meds  Adult Formula Drip Feeding: Continuous Vital High Protein; Orogastric tube; Goal Rate: 15; mL/hr    Hines Catheter: PRESENT, indication: Deep Sedation/Paralysis  Disposition: Expected discharge in 3+ days to TBD. Goals prior to discharge include manage ICU issues.   Incidental Findings: As above.  Family updated today:   Josr updated family.    40 MINUTES SPENT BY ME on the date of service doing chart review, history, exam, documentation & further activities per the note.      Interval History   Intubated, sedated.  Overnight events reviewed.  Discussed with intensivist, RT, and RN.    -Data reviewed today: I personally reviewed all new labs and imaging results over the last 24 hours.     Physical Exam   Temp: 99.2  F (37.3  C) Temp src: Oral BP: 121/53 Pulse: 88   Resp: 19 SpO2: 97 % O2 Device: Mechanical Ventilator    Vitals:    06/21/23 0304 06/23/23 0345 06/24/23 0600   Weight: 64.5 kg (142 lb 3.2 oz) 67.7 kg (149 lb 4 oz) 73.7 kg (162 lb 7.7 oz)     Vital Signs with Ranges  Temp:  [96.9  F (36.1  C)-99.2  F (37.3  C)] 99.2  F (37.3  C)  Pulse:  [] 88  Resp:  [10-26] 19  BP: (110-124)/(53-70) 121/53  MAP:  [65 mmHg-84 mmHg] 69 mmHg  Arterial Line BP: ()/(17-69) 113/49  FiO2 (%):  [40 %] 40 %  SpO2:  [91 %-99 %] 97 %  I/O last 3 completed shifts:  In: 4302.2 [I.V.:3422.2; NG/GT:570]  Out: 1655 [Urine:1655]    GENERAL: No apparent distress. Intubated, sedated.  HEENT: Normocephalic, atraumatic.   CARDIOVASCULAR: Regular rate and rhythm without murmurs or rubs. No S3.  PULMONARY: Coarse wheezing bilaterally.  GASTROINTESTINAL: Soft, non-tender, non-distended. Bowel sounds normoactive.   EXTREMITIES: No cyanosis or clubbing. No edema.  NEUROLOGICAL: Sedated.  DERMATOLOGICAL: No rash, ulcer, bruising, nor jaundice.     Medications     albuterol (PROVENTIL) 40 mg in sodium chloride 0.9 % 32 mL continuous nebulization solution Stopped (06/23/23 0921)     fentaNYL 200 mcg/hr (06/24/23 0800)     ketamine 53.6 mg/hr (06/24/23 0800)     lactated ringers 100 mL/hr at 06/24/23 0800     propofol 60 mcg/kg/min (06/24/23 0800)    And     - MEDICATION INSTRUCTIONS -       norepinephrine Stopped (06/24/23 0134)     - MEDICATION INSTRUCTIONS -       vecuronium (NORCURON) 1 mg/mL in D5W 50 mL 0.8 mcg/kg/min (06/24/23 0800)        apixaban ANTICOAGULANT  5 mg Per Feeding Tube BID     artificial tears   Both Eyes Q8H     atorvastatin  20 mg Per Feeding Tube Daily     azithromycin  500 mg Intravenous Q24H     diltiazem  60 mg Oral or Feeding Tube Q6H [facility]     insulin aspart  1-6 Units Subcutaneous Q4H     ipratropium  0.5 mg Nebulization 4x daily     levalbuterol  0.63 mg Nebulization 4x Daily     levothyroxine  112 mcg Per Feeding Tube Daily     methylPREDNISolone  125 mg Intravenous BID     multivitamins w/minerals  15 mL Per Feeding Tube Daily     nicotine  1 patch Transdermal Daily     nicotine   Transdermal Q8H     pantoprazole  40 mg Per Feeding Tube Daily     PARoxetine  20 mg Per Feeding Tube Daily     protein modular  1 packet Per Feeding Tube BID     sodium chloride (PF)  10-40 mL Intracatheter Q8H     sodium chloride (PF)  3 mL Intracatheter Q8H     Data     Laboratory:  Recent Labs   Lab 06/24/23  0407 06/23/23  2207 06/23/23  1351 06/23/23  0550 06/22/23  1456 06/22/23  0518   WBC 12.8*  --   --  14.2*  --  11.7*   HGB 9.8* 10.7* 10.7* 10.8*  10.8*   < > 11.9  11.9   HCT 30.9*  --   --  35.1  --  36.4   *  --   --  107*  --  103*     --   --  205  --  185    < > = values in this interval not displayed.     Recent Labs   Lab 06/24/23  0751 06/24/23  0407 06/24/23  0405 06/23/23  0744 06/23/23  0550 06/22/23  1539 06/22/23  1458 06/22/23  0743 06/22/23  0518   NA  --  137  --   --  137  --   --   --  140   POTASSIUM  --  5.5*  --   --  4.3  --  4.0  --  4.0   CHLORIDE  --  101  --   --  99  --   --   --  100   CO2  --  31*  --   --  31*  --   --   --  28   ANIONGAP  --  5*  --   --  7  --   --   --  12   * 171* 158*   < > 174*   < >  --    < > 204*   BUN  --  39.1*  --   --  20.0  --   --   --  14.1   CR  --  0.97*  --   --  0.97*  --   --   --  0.82   GFRESTIMATED  --  67  --   --  67  --   --   --  82   EDIN  --  8.5*  --   --  8.7  --   --   --  9.1    < > = values in this interval not displayed.     No  results for input(s): CULT in the last 168 hours.    Imaging:  No results found for this or any previous visit (from the past 24 hour(s)).      Kody Prieto DO MPH  [facility] Hospitalist  [address] [place] [address]  06/24/2023

## 2023-06-24 NOTE — PROGRESS NOTES
[facility] ICU VENTILATOR RESPIRATORY NOTE  Date of Admission: 23  Date of Intubation (most recent): 23  Reason for Mechanical Ventilation: Respiratory failure  Number of Days on Mechanical Ventilation: 4  Met Criteria for Pressure Support Trial: No  Reason for No Pressure Support Trial: On paralytic  AB.29///33  Vent Mode: CMV/AC  (Continuous Mandatory Ventilation/ Assist Control)  FiO2 (%): 40 %  Resp Rate (Set): 20 breaths/min  Tidal Volume (Set, mL): 270 mL  PEEP (cm H2O): 8 cmH2O  Inspiratory Pressure (cm H2O) (Drager Norma): 55  Resp: 19    Plan: Continue to monitor. Wean as tolerated.     Dior Barba RT

## 2023-06-25 LAB
ALLEN'S TEST: ABNORMAL
ANION GAP SERPL CALCULATED.3IONS-SCNC: 6 MMOL/L (ref 7–15)
BASE EXCESS BLDA CALC-SCNC: 7.4 MMOL/L (ref -9–1.8)
BUN SERPL-MCNC: 48.2 MG/DL (ref 8–23)
CALCIUM SERPL-MCNC: 8.9 MG/DL (ref 8.6–10)
CHLORIDE SERPL-SCNC: 101 MMOL/L (ref 98–107)
CREAT SERPL-MCNC: 0.87 MG/DL (ref 0.51–0.95)
DEPRECATED HCO3 PLAS-SCNC: 32 MMOL/L (ref 22–29)
ERYTHROCYTE [DISTWIDTH] IN BLOOD BY AUTOMATED COUNT: 12.6 % (ref 10–15)
GFR SERPL CREATININE-BSD FRML MDRD: 76 ML/MIN/1.73M2
GLUCOSE BLDC GLUCOMTR-MCNC: 142 MG/DL (ref 70–99)
GLUCOSE BLDC GLUCOMTR-MCNC: 146 MG/DL (ref 70–99)
GLUCOSE BLDC GLUCOMTR-MCNC: 150 MG/DL (ref 70–99)
GLUCOSE BLDC GLUCOMTR-MCNC: 152 MG/DL (ref 70–99)
GLUCOSE BLDC GLUCOMTR-MCNC: 153 MG/DL (ref 70–99)
GLUCOSE BLDC GLUCOMTR-MCNC: 170 MG/DL (ref 70–99)
GLUCOSE SERPL-MCNC: 158 MG/DL (ref 70–99)
HCO3 BLD-SCNC: 36 MMOL/L (ref 21–28)
HCT VFR BLD AUTO: 32.2 % (ref 35–47)
HGB BLD-MCNC: 10.2 G/DL (ref 11.7–15.7)
MAGNESIUM SERPL-MCNC: 2.6 MG/DL (ref 1.7–2.3)
MCH RBC QN AUTO: 33.3 PG (ref 26.5–33)
MCHC RBC AUTO-ENTMCNC: 31.7 G/DL (ref 31.5–36.5)
MCV RBC AUTO: 105 FL (ref 78–100)
O2/TOTAL GAS SETTING VFR VENT: 50 %
PCO2 BLD: 74 MM HG (ref 35–45)
PH BLD: 7.3 [PH] (ref 7.35–7.45)
PHOSPHATE SERPL-MCNC: 4.7 MG/DL (ref 2.5–4.5)
PLATELET # BLD AUTO: 168 10E3/UL (ref 150–450)
PO2 BLD: 100 MM HG (ref 80–105)
POTASSIUM SERPL-SCNC: 4.4 MMOL/L (ref 3.4–5.3)
POTASSIUM SERPL-SCNC: 4.8 MMOL/L (ref 3.4–5.3)
POTASSIUM SERPL-SCNC: 4.9 MMOL/L (ref 3.4–5.3)
POTASSIUM SERPL-SCNC: 5.6 MMOL/L (ref 3.4–5.3)
RBC # BLD AUTO: 3.06 10E6/UL (ref 3.8–5.2)
SODIUM SERPL-SCNC: 139 MMOL/L (ref 136–145)
WBC # BLD AUTO: 10.7 10E3/UL (ref 4–11)

## 2023-06-25 PROCEDURE — 258N000003 HC RX IP 258 OP 636: Performed by: INTERNAL MEDICINE

## 2023-06-25 PROCEDURE — 250N000013 HC RX MED GY IP 250 OP 250 PS 637: Performed by: INTERNAL MEDICINE

## 2023-06-25 PROCEDURE — 84132 ASSAY OF SERUM POTASSIUM: CPT | Performed by: INTERNAL MEDICINE

## 2023-06-25 PROCEDURE — 94640 AIRWAY INHALATION TREATMENT: CPT | Mod: 76

## 2023-06-25 PROCEDURE — 250N000013 HC RX MED GY IP 250 OP 250 PS 637: Performed by: HOSPITALIST

## 2023-06-25 PROCEDURE — 83735 ASSAY OF MAGNESIUM: CPT | Performed by: HOSPITALIST

## 2023-06-25 PROCEDURE — 85027 COMPLETE CBC AUTOMATED: CPT | Performed by: INTERNAL MEDICINE

## 2023-06-25 PROCEDURE — 250N000011 HC RX IP 250 OP 636: Mod: JZ | Performed by: SURGERY

## 2023-06-25 PROCEDURE — 250N000011 HC RX IP 250 OP 636: Performed by: INTERNAL MEDICINE

## 2023-06-25 PROCEDURE — 250N000011 HC RX IP 250 OP 636: Performed by: HOSPITALIST

## 2023-06-25 PROCEDURE — 200N000001 HC R&B ICU

## 2023-06-25 PROCEDURE — 94003 VENT MGMT INPAT SUBQ DAY: CPT

## 2023-06-25 PROCEDURE — 99232 SBSQ HOSP IP/OBS MODERATE 35: CPT | Performed by: HOSPITALIST

## 2023-06-25 PROCEDURE — 250N000009 HC RX 250: Performed by: INTERNAL MEDICINE

## 2023-06-25 PROCEDURE — 84100 ASSAY OF PHOSPHORUS: CPT | Performed by: HOSPITALIST

## 2023-06-25 PROCEDURE — 99291 CRITICAL CARE FIRST HOUR: CPT | Performed by: SURGERY

## 2023-06-25 PROCEDURE — 82803 BLOOD GASES ANY COMBINATION: CPT | Performed by: SURGERY

## 2023-06-25 PROCEDURE — 999N000157 HC STATISTIC RCP TIME EA 10 MIN

## 2023-06-25 PROCEDURE — 94640 AIRWAY INHALATION TREATMENT: CPT

## 2023-06-25 PROCEDURE — 250N000011 HC RX IP 250 OP 636: Mod: JZ | Performed by: INTERNAL MEDICINE

## 2023-06-25 PROCEDURE — 258N000003 HC RX IP 258 OP 636: Performed by: SURGERY

## 2023-06-25 PROCEDURE — 250N000009 HC RX 250: Performed by: SURGERY

## 2023-06-25 PROCEDURE — 80048 BASIC METABOLIC PNL TOTAL CA: CPT | Performed by: INTERNAL MEDICINE

## 2023-06-25 PROCEDURE — 999N000040 HC STATISTIC CONSULT NO CHARGE VASC ACCESS

## 2023-06-25 PROCEDURE — 84132 ASSAY OF SERUM POTASSIUM: CPT | Performed by: HOSPITALIST

## 2023-06-25 RX ORDER — FUROSEMIDE 10 MG/ML
20 INJECTION INTRAMUSCULAR; INTRAVENOUS ONCE
Status: COMPLETED | OUTPATIENT
Start: 2023-06-25 | End: 2023-06-25

## 2023-06-25 RX ORDER — FUROSEMIDE 10 MG/ML
20 INJECTION INTRAMUSCULAR; INTRAVENOUS EVERY 12 HOURS
Status: COMPLETED | OUTPATIENT
Start: 2023-06-25 | End: 2023-06-26

## 2023-06-25 RX ORDER — CALCIUM GLUCONATE 20 MG/ML
2 INJECTION, SOLUTION INTRAVENOUS ONCE
Status: COMPLETED | OUTPATIENT
Start: 2023-06-25 | End: 2023-06-25

## 2023-06-25 RX ADMIN — HYDRALAZINE HYDROCHLORIDE 10 MG: 20 INJECTION INTRAMUSCULAR; INTRAVENOUS at 18:02

## 2023-06-25 RX ADMIN — Medication 175 MCG/HR: at 03:43

## 2023-06-25 RX ADMIN — FUROSEMIDE 20 MG: 10 INJECTION, SOLUTION INTRAMUSCULAR; INTRAVENOUS at 12:14

## 2023-06-25 RX ADMIN — METHYLPREDNISOLONE SODIUM SUCCINATE 125 MG: 125 INJECTION INTRAMUSCULAR; INTRAVENOUS at 19:38

## 2023-06-25 RX ADMIN — DILTIAZEM HYDROCHLORIDE 60 MG: 30 TABLET, FILM COATED ORAL at 17:45

## 2023-06-25 RX ADMIN — IPRATROPIUM BROMIDE 0.5 MG: 0.5 SOLUTION RESPIRATORY (INHALATION) at 11:26

## 2023-06-25 RX ADMIN — SODIUM ZIRCONIUM CYCLOSILICATE 10 G: 10 POWDER, FOR SUSPENSION ORAL at 23:04

## 2023-06-25 RX ADMIN — POLYETHYLENE GLYCOL 3350 17 G: 17 POWDER, FOR SOLUTION ORAL at 19:38

## 2023-06-25 RX ADMIN — LORAZEPAM 0.5 MG: 2 INJECTION INTRAMUSCULAR; INTRAVENOUS at 20:45

## 2023-06-25 RX ADMIN — PROPOFOL 20 MCG/KG/MIN: 10 INJECTION, EMULSION INTRAVENOUS at 16:44

## 2023-06-25 RX ADMIN — NICOTINE 1 PATCH: 14 PATCH, EXTENDED RELEASE TRANSDERMAL at 08:06

## 2023-06-25 RX ADMIN — LEVALBUTEROL HYDROCHLORIDE 0.63 MG: 0.63 SOLUTION RESPIRATORY (INHALATION) at 15:22

## 2023-06-25 RX ADMIN — INSULIN ASPART 1 UNITS: 100 INJECTION, SOLUTION INTRAVENOUS; SUBCUTANEOUS at 08:01

## 2023-06-25 RX ADMIN — KETAMINE HYDROCHLORIDE 53.6 MG/HR: 100 INJECTION, SOLUTION INTRAMUSCULAR; INTRAVENOUS at 00:34

## 2023-06-25 RX ADMIN — Medication 50 MCG: at 16:00

## 2023-06-25 RX ADMIN — SODIUM ZIRCONIUM CYCLOSILICATE 10 G: 10 POWDER, FOR SUSPENSION ORAL at 15:49

## 2023-06-25 RX ADMIN — IPRATROPIUM BROMIDE 0.5 MG: 0.5 SOLUTION RESPIRATORY (INHALATION) at 20:02

## 2023-06-25 RX ADMIN — Medication 50 MCG: at 14:00

## 2023-06-25 RX ADMIN — Medication 40 MG: at 08:09

## 2023-06-25 RX ADMIN — SODIUM ZIRCONIUM CYCLOSILICATE 10 G: 10 POWDER, FOR SUSPENSION ORAL at 09:47

## 2023-06-25 RX ADMIN — INSULIN ASPART 1 UNITS: 100 INJECTION, SOLUTION INTRAVENOUS; SUBCUTANEOUS at 00:36

## 2023-06-25 RX ADMIN — Medication 50 MCG: at 17:55

## 2023-06-25 RX ADMIN — INSULIN ASPART 1 UNITS: 100 INJECTION, SOLUTION INTRAVENOUS; SUBCUTANEOUS at 03:59

## 2023-06-25 RX ADMIN — LEVALBUTEROL HYDROCHLORIDE 0.63 MG: 0.63 SOLUTION RESPIRATORY (INHALATION) at 07:50

## 2023-06-25 RX ADMIN — ATORVASTATIN CALCIUM 20 MG: 20 TABLET, FILM COATED ORAL at 08:07

## 2023-06-25 RX ADMIN — PROPOFOL 50 MCG/KG/MIN: 10 INJECTION, EMULSION INTRAVENOUS at 22:36

## 2023-06-25 RX ADMIN — IPRATROPIUM BROMIDE 0.5 MG: 0.5 SOLUTION RESPIRATORY (INHALATION) at 15:22

## 2023-06-25 RX ADMIN — Medication 15 ML: at 08:06

## 2023-06-25 RX ADMIN — FUROSEMIDE 20 MG: 10 INJECTION, SOLUTION INTRAMUSCULAR; INTRAVENOUS at 05:19

## 2023-06-25 RX ADMIN — SENNOSIDES 10 ML: 8.8 LIQUID ORAL at 04:00

## 2023-06-25 RX ADMIN — INSULIN ASPART 1 UNITS: 100 INJECTION, SOLUTION INTRAVENOUS; SUBCUTANEOUS at 15:58

## 2023-06-25 RX ADMIN — MINERAL OIL AND WHITE PETROLATUM: 150; 830 OINTMENT OPHTHALMIC at 07:59

## 2023-06-25 RX ADMIN — INSULIN ASPART 1 UNITS: 100 INJECTION, SOLUTION INTRAVENOUS; SUBCUTANEOUS at 12:21

## 2023-06-25 RX ADMIN — APIXABAN 5 MG: 5 TABLET, FILM COATED ORAL at 08:07

## 2023-06-25 RX ADMIN — IPRATROPIUM BROMIDE 0.5 MG: 0.5 SOLUTION RESPIRATORY (INHALATION) at 07:50

## 2023-06-25 RX ADMIN — LEVOTHYROXINE SODIUM 112 MCG: 0.11 TABLET ORAL at 08:08

## 2023-06-25 RX ADMIN — LEVALBUTEROL HYDROCHLORIDE 0.63 MG: 0.63 SOLUTION RESPIRATORY (INHALATION) at 11:25

## 2023-06-25 RX ADMIN — DILTIAZEM HYDROCHLORIDE 60 MG: 30 TABLET, FILM COATED ORAL at 12:13

## 2023-06-25 RX ADMIN — APIXABAN 5 MG: 5 TABLET, FILM COATED ORAL at 21:08

## 2023-06-25 RX ADMIN — INSULIN ASPART 1 UNITS: 100 INJECTION, SOLUTION INTRAVENOUS; SUBCUTANEOUS at 19:49

## 2023-06-25 RX ADMIN — LEVALBUTEROL HYDROCHLORIDE 0.63 MG: 0.63 SOLUTION RESPIRATORY (INHALATION) at 20:02

## 2023-06-25 RX ADMIN — VECURONIUM BROMIDE 1.1 MCG/KG/MIN: 1 INJECTION, POWDER, LYOPHILIZED, FOR SOLUTION INTRAVENOUS at 04:58

## 2023-06-25 RX ADMIN — CALCIUM GLUCONATE 2 G: 20 INJECTION, SOLUTION INTRAVENOUS at 05:17

## 2023-06-25 RX ADMIN — DILTIAZEM HYDROCHLORIDE 60 MG: 30 TABLET, FILM COATED ORAL at 00:40

## 2023-06-25 RX ADMIN — PAROXETINE 20 MG: 10 SUSPENSION ORAL at 08:09

## 2023-06-25 RX ADMIN — PROPOFOL 20 MCG/KG/MIN: 10 INJECTION, EMULSION INTRAVENOUS at 05:37

## 2023-06-25 RX ADMIN — SODIUM CHLORIDE, POTASSIUM CHLORIDE, SODIUM LACTATE AND CALCIUM CHLORIDE: 600; 310; 30; 20 INJECTION, SOLUTION INTRAVENOUS at 00:56

## 2023-06-25 RX ADMIN — SENNOSIDES 10 ML: 8.8 LIQUID ORAL at 12:21

## 2023-06-25 RX ADMIN — METHYLPREDNISOLONE SODIUM SUCCINATE 125 MG: 125 INJECTION INTRAMUSCULAR; INTRAVENOUS at 07:59

## 2023-06-25 RX ADMIN — MINERAL OIL AND WHITE PETROLATUM: 150; 830 OINTMENT OPHTHALMIC at 15:58

## 2023-06-25 RX ADMIN — VECURONIUM BROMIDE 1.4 MCG/KG/MIN: 1 INJECTION, POWDER, LYOPHILIZED, FOR SOLUTION INTRAVENOUS at 19:08

## 2023-06-25 RX ADMIN — DILTIAZEM HYDROCHLORIDE 60 MG: 30 TABLET, FILM COATED ORAL at 05:45

## 2023-06-25 RX ADMIN — Medication 175 MCG/HR: at 17:45

## 2023-06-25 ASSESSMENT — ACTIVITIES OF DAILY LIVING (ADL)
ADLS_ACUITY_SCORE: 32

## 2023-06-25 NOTE — PLAN OF CARE
ICU End of Shift Summary.  For vital signs and complete assessments, please see documentation flowsheets.      Pertinent assessments: Continues on full vent support, fio2 increased to 50% Pt oxygen dropping to 87 %. Low grade fever of 99.6, Tele: ST/SR with 3 rans of SVT, LS: coarse/Dim, Active BS-abdomen distended- stool softeners PRN given- no stool on this shift, Hines intact- Pt responded well with UOP after Lasix.    Major Shift Events: BP elevated more with repositioning/activity hydralazine PRN given with little improvement, Intensivisit ok to titrate propofol up, Vec increased for goal 2/4 twitches, Continues to have high PIPS with neb treatment, K+ now normal.    Plan (Upcoming Events):Plan to wean off paralytics tomorrow,    Discharge/Transfer Needs: TBD     Bedside Shift Report Completed : Y  Bedside Safety Check Completed: Y      Goal Outcome Evaluation:      Plan of Care Reviewed With: spouse, child    Overall Patient Progress: no changeOverall Patient Progress: no change

## 2023-06-25 NOTE — PROGRESS NOTES
[facility] ICU VENTILATOR RESPIRATORY NOTE  Date of Admission: 6/20/23  Date of Intubation (most recent): 6/21/23  Reason for Mechanical Ventilation: Respiratory failure  Number of Days on Mechanical Ventilation: 5  Met Criteria for Pressure Support Trial: No  Reason for No Pressure Support Trial: On paralytic  Vent Mode: CMV/AC  (Continuous Mandatory Ventilation/ Assist Control)  FiO2 (%): 50 %  Resp Rate (Set): 20 breaths/min  Tidal Volume (Set, mL): 270 mL  PEEP (cm H2O): 8 cmH2O  Inspiratory Pressure (cm H2O) (Drager Norma): 40  Resp: 20  Plan: continue to monitor. Wean as tolerated.    Dior Barba, RT

## 2023-06-25 NOTE — PROGRESS NOTES
[facility] ICU VENTILATOR RESPIRATORY NOTE  Date of Admission: 6/20/2023  Date of Intubation (most recent): 6/21/2023  Reason for Mechanical Ventilation: respiratory failure  Number of Days on Mechanical Ventilation: 5  Met criteria for pressure support trial: no  Reason for No Pressure Support Trial: paralytic  Significant Events Today: FiO2 increased from 45 to 50%.    Current vent settings, CMV 20/270/+8/50%. BS coarse, continues to receive xopenex and atrovent QID inline.     PIP increases with nebulizer, 52-57.

## 2023-06-25 NOTE — PROGRESS NOTES
ICU staff  DOS 6/25/2023    No major events overnight. Was hyperkalemic again on morning labs, etiology of this is unclear -- renal function good, not getting exogenous K in any significant volume (LR @100, tf @15). Lokelma given. Overall, peak pressures are better (mostly 40s) with transient spikes after nebs; minute volume is getting better and blood gas looks stable.    Vitals:   Temp:  [97  F (36.1  C)-98.8  F (37.1  C)] 98.8  F (37.1  C)  Pulse:  [] 80  Resp:  [18-20] 20  BP: (136-147)/(65-69) 147/69  MAP:  [74 mmHg-98 mmHg] 85 mmHg  Arterial Line BP: (117-154)/(53-71) 132/63  FiO2 (%):  [40 %-50 %] 45 %  SpO2:  [89 %-99 %] 95 %     Vent Mode: CMV/AC  (Continuous Mandatory Ventilation/ Assist Control)  FiO2 (%): 45 %  Resp Rate (Set): 25 breaths/min  Tidal Volume (Set, mL): 270 mL  PEEP (cm H2O): 8 cmH2O  Inspiratory Pressure (cm H2O) (Drager Norma): 40  Resp: 20    I/O last 3 completed shifts:  In: 3716.22 [I.V.:2681.22; NG/GT:675]  Out: 2975 [Urine:2975]    Fentanyl 175  Ketamine 53  Propofol 20  Vec 1.1    Exam:  Gen: Intubated, sedated, paralyzed  HEENT: NC/AT, anicteric  Pulm: Chest tight with restricted air movement, diffuse wheezing  Cor: RRR  Abdomen/GI: Soft, distended with tympany  : Hines in place, urine clear  Extremities: Warm, edematous  Skin: Well-perfused  Neuro: Sedated/paralyzed  Psych: Unable to assess    Data:   Labs reviewed  - K 5.6 -> 4.9  - WBCs down at 10.7  - 7.3/74/100/36  No new imaging or cultures    Assessment/plan:  56 y/o woman with severe COPD at baseline, admitted and intubated 6/21 for worsening acute on chronic hypercarbic respiratory failure. Significant bronchospasm and inability to effectively ventilate after intubation; have increased steroids, started ketamine infusion for bronchodilation, and added neuromuscular blockade for paralysis. Continuous nebs 6/22 were not effective and thus discontinued. Some slow improvement in airway pressures and minute volume over  the last 48 hours.   CNS: Intubated, sedated, chemically paralyzed.   # Pain  # Sedation  # Anxiety/depression  - Continue fentanyl, propofol  - Ketamine for bronchospasm rather than sedation  - Continue neuromuscular blockade today  - Home paroxetine  Pulm: Minute volumes up into mid-5s from 3, PaCO2 is stable around 70 with adequately compensated pH not requiring buffering. Still wheezing with limited air movement. Auto-PEEP 8-15 last 12 hours, down from 10s-20s.  # Acute on chronic hypercarbic respiratory failure  # COPD, severe/oxygen-dependent at baseline  # COPD exacerbation  # ?Status asthmaticus/severe bronchospasm  - Continue current ventilator settings -- she is moving air a little better overall  - Continue ketamine for bronchospasm, other nebs/inhalers  - Continue high-dose methylprednisolone, if stable tomorrow would consider halving dose back to 62.5 mg  - Given persistent high airway pressures will keep the vecuronium going for now  - Completed course of azithromycin  - Continuous albuterol was ineffective; unclear how to interpret transient spikes in Ppk after nebs, they do not seem to be limiting volume delivery any longer  - Spoke with [facility], she is not a candidate for VV ECMO  CV: Off pressors, actually a bit hypertensive today.   # Hypotension, resolved  # Essential hypertension  # Paroxysmal atrial fibrillation  - Resolution of hypotension correlated to improved intrathoracic pressures  - Continue diltiazem  - Continue apixiban  - Diuresis should help with pressures, follow; can add prn meds if needed  GI: Abdomen mildly distended  # Nutrition  - On trophic-level tube feedings, do not advance  - Checking residuals, hold feeds if any question  : UOP brisk after furosemide this AM; is clinically volume up by exam/i&o/weght  # Hypervolemia  # Hyperkalemia  - Will tko fluids  - Two additional doses of furosemide ordered  - On lokelma for K, will discuss on rounds  Heme: Hb 10.2 (9.8)  # Anemia of  critical illness  - No indication to transfuse  Msk: Extremities warm, well-perfused. Edema.  Endo: Glucose 142-158.  # Stress/steroid hyperglycemia  # Hypothyroidism  - Insulin prn  - Levothyroxine  ID: Afebrile, WBCs 10.7  # COPD exacerbation  - Compled course of azithromycin  ICU: Apixiban, PPI    This patient is critically ill to my assessment and requires ICU monitoring and cares. A total of 35 minutes critical care time spent on 6/25/2023, exclusive of procedures.     Jn Ovalles MD, PhD  Surgical critical care  6/25/2023, 9:43 AM

## 2023-06-25 NOTE — PLAN OF CARE
ICU End of Shift Summary.  For vital signs and complete assessments, please see documentation flowsheets.      Pertinent assessments: Patient medically paralyzed and sedated. BIS now in 40's, propofol adjusted when BIS in 30's, goal 40-60. Remains on full vent support with TV set at 270, rarely pulling in set volume, much of shift 250-260's. RT called and spoke with Tele hub md regarding vent settings and resistance, no new orders received- see RT note. Tele- SR with 1-2 self limiting quick runs of SVT. BP: off Levo for more than 24 hours now, BP's now more 130's-140's. K mildly elevated, stubborn with interventions, Lokelma given x 1 this shift (total of 3 doses now) with recheck at 5.6, Calcium gluconate given along with Lasix recheck scheduled for 0800. Good urine output overnight about 100/hr. No Bm's senokot given x 1. Trace edema noted on BLE and BUE. Weight increased again this morning, questioning bed scale as weighed exact same as yesterday and up 7 pounds.   Major Shift Events:   - Lokelma given for K 5.5  - Calcium Gluconate and Lasix given for K 5.6  - Bath given  - Prop titrated down to 20  -Senokot given x 1    Plan (Upcoming Events): Continue to watch K. Possibility of trial of Vec today?  Discharge/Transfer Needs: TBD pending progress.     Bedside Shift Report Completed : Y  Bedside Safety Check Completed: Y    Goal Outcome Evaluation:      Plan of Care Reviewed With: patient    Overall Patient Progress: no changeOverall Patient Progress: no change

## 2023-06-25 NOTE — PROGRESS NOTES
Patient with K 5.6. She received lokelma overnight and 2x during the daytime.     Will give 2 g Ca gluconate, 20 mg IV lasix x 1 as she is overall volume up.      Meds reviewed - no clear source of high potassium. Cr is wnl. Her UOP is adequate.     Would ask pharmD to review meds for causes of hyperkalemia in am.     Cathryn Price MD  Pulmonary, Allergy, Critical Care, and Sleep Medicine   [facility]   Pager: [phone]

## 2023-06-25 NOTE — PROGRESS NOTES
Discussed issue with Vt after xopenex nebulizer. PIP increased and patient only receiving about 215 - 240 ml Vt. Upon recheck patient has been receiving normal set Vts suggesting a transient effect after bronchodilator. Will continue to monitor to see if this trend is ongoing.     Notified by RN that K is 5.5. Have ordered additional lokelma for patient (10 mg TID x 4 doses).     Cathryn Price MD  Pulmonary, Allergy, Critical Care, and Sleep Medicine   [facility]   Pager: [phone]

## 2023-06-25 NOTE — PROGRESS NOTES
Spoke with tele-hub about some concerns regarding ventilation of patient: a. PIP increase 12-14 cm with start of bronchodilator for the past 2 days, b. PIP increasing to the point of Vt not being delivered, pt receiving Vt 215-230 ml and sats dropping to 89% with this change in Vt delivery. Tele-hub requested RT document in a note the observance of PIP increasing with neb delivery and to continue to monitor patient's Vt overnight with no changes to vent settings. RT will continue to monitor closely.    Dior Barba, RT

## 2023-06-26 ENCOUNTER — APPOINTMENT (OUTPATIENT)
Dept: GENERAL RADIOLOGY | Facility: CLINIC | Age: 58
End: 2023-06-26
Attending: ANESTHESIOLOGY
Payer: COMMERCIAL

## 2023-06-26 ENCOUNTER — APPOINTMENT (OUTPATIENT)
Dept: CT IMAGING | Facility: CLINIC | Age: 58
End: 2023-06-26
Attending: ANESTHESIOLOGY
Payer: COMMERCIAL

## 2023-06-26 LAB
ALLEN'S TEST: ABNORMAL
ANION GAP SERPL CALCULATED.3IONS-SCNC: 8 MMOL/L (ref 7–15)
BASE EXCESS BLDA CALC-SCNC: 14.5 MMOL/L (ref -9–1.8)
BUN SERPL-MCNC: 49.9 MG/DL (ref 8–23)
CALCIUM SERPL-MCNC: 8.8 MG/DL (ref 8.6–10)
CHLORIDE SERPL-SCNC: 101 MMOL/L (ref 98–107)
CREAT SERPL-MCNC: 0.67 MG/DL (ref 0.51–0.95)
DEPRECATED HCO3 PLAS-SCNC: 38 MMOL/L (ref 22–29)
ERYTHROCYTE [DISTWIDTH] IN BLOOD BY AUTOMATED COUNT: 12.9 % (ref 10–15)
GFR SERPL CREATININE-BSD FRML MDRD: >90 ML/MIN/1.73M2
GLUCOSE BLDC GLUCOMTR-MCNC: 151 MG/DL (ref 70–99)
GLUCOSE BLDC GLUCOMTR-MCNC: 158 MG/DL (ref 70–99)
GLUCOSE BLDC GLUCOMTR-MCNC: 158 MG/DL (ref 70–99)
GLUCOSE BLDC GLUCOMTR-MCNC: 160 MG/DL (ref 70–99)
GLUCOSE BLDC GLUCOMTR-MCNC: 167 MG/DL (ref 70–99)
GLUCOSE BLDC GLUCOMTR-MCNC: 169 MG/DL (ref 70–99)
GLUCOSE SERPL-MCNC: 170 MG/DL (ref 70–99)
HCO3 BLD-SCNC: 43 MMOL/L (ref 21–28)
HCT VFR BLD AUTO: 31.9 % (ref 35–47)
HGB BLD-MCNC: 9.8 G/DL (ref 11.7–15.7)
MAGNESIUM SERPL-MCNC: 2.4 MG/DL (ref 1.7–2.3)
MCH RBC QN AUTO: 33.1 PG (ref 26.5–33)
MCHC RBC AUTO-ENTMCNC: 30.7 G/DL (ref 31.5–36.5)
MCV RBC AUTO: 108 FL (ref 78–100)
O2/TOTAL GAS SETTING VFR VENT: 50 %
PCO2 BLD: 76 MM HG (ref 35–45)
PH BLD: 7.36 [PH] (ref 7.35–7.45)
PHOSPHATE SERPL-MCNC: 3.8 MG/DL (ref 2.5–4.5)
PLATELET # BLD AUTO: 160 10E3/UL (ref 150–450)
PO2 BLD: 92 MM HG (ref 80–105)
POTASSIUM SERPL-SCNC: 4.2 MMOL/L (ref 3.4–5.3)
POTASSIUM SERPL-SCNC: 4.3 MMOL/L (ref 3.4–5.3)
RBC # BLD AUTO: 2.96 10E6/UL (ref 3.8–5.2)
SODIUM SERPL-SCNC: 147 MMOL/L (ref 136–145)
WBC # BLD AUTO: 8.7 10E3/UL (ref 4–11)

## 2023-06-26 PROCEDURE — 250N000009 HC RX 250: Performed by: INTERNAL MEDICINE

## 2023-06-26 PROCEDURE — 200N000001 HC R&B ICU

## 2023-06-26 PROCEDURE — 99252 IP/OBS CONSLTJ NEW/EST SF 35: CPT | Performed by: THORACIC SURGERY (CARDIOTHORACIC VASCULAR SURGERY)

## 2023-06-26 PROCEDURE — 250N000011 HC RX IP 250 OP 636: Performed by: SURGERY

## 2023-06-26 PROCEDURE — 71045 X-RAY EXAM CHEST 1 VIEW: CPT | Mod: 76

## 2023-06-26 PROCEDURE — 87077 CULTURE AEROBIC IDENTIFY: CPT | Performed by: SURGERY

## 2023-06-26 PROCEDURE — 272N000431 CT CHEST TUBE WITH CATH PLACEMENT

## 2023-06-26 PROCEDURE — 84100 ASSAY OF PHOSPHORUS: CPT | Performed by: HOSPITALIST

## 2023-06-26 PROCEDURE — 250N000011 HC RX IP 250 OP 636: Mod: JZ | Performed by: SURGERY

## 2023-06-26 PROCEDURE — 94640 AIRWAY INHALATION TREATMENT: CPT

## 2023-06-26 PROCEDURE — 250N000013 HC RX MED GY IP 250 OP 250 PS 637: Performed by: INTERNAL MEDICINE

## 2023-06-26 PROCEDURE — 258N000003 HC RX IP 258 OP 636: Performed by: INTERNAL MEDICINE

## 2023-06-26 PROCEDURE — 82803 BLOOD GASES ANY COMBINATION: CPT | Performed by: HOSPITALIST

## 2023-06-26 PROCEDURE — 250N000011 HC RX IP 250 OP 636: Performed by: INTERNAL MEDICINE

## 2023-06-26 PROCEDURE — 87040 BLOOD CULTURE FOR BACTERIA: CPT | Performed by: SURGERY

## 2023-06-26 PROCEDURE — 87205 SMEAR GRAM STAIN: CPT | Performed by: SURGERY

## 2023-06-26 PROCEDURE — 0W9B30Z DRAINAGE OF LEFT PLEURAL CAVITY WITH DRAINAGE DEVICE, PERCUTANEOUS APPROACH: ICD-10-PCS | Performed by: RADIOLOGY

## 2023-06-26 PROCEDURE — 71045 X-RAY EXAM CHEST 1 VIEW: CPT

## 2023-06-26 PROCEDURE — 250N000011 HC RX IP 250 OP 636: Performed by: HOSPITALIST

## 2023-06-26 PROCEDURE — 83735 ASSAY OF MAGNESIUM: CPT | Performed by: HOSPITALIST

## 2023-06-26 PROCEDURE — 94003 VENT MGMT INPAT SUBQ DAY: CPT

## 2023-06-26 PROCEDURE — 84520 ASSAY OF UREA NITROGEN: CPT | Performed by: INTERNAL MEDICINE

## 2023-06-26 PROCEDURE — 99232 SBSQ HOSP IP/OBS MODERATE 35: CPT | Performed by: HOSPITALIST

## 2023-06-26 PROCEDURE — 99291 CRITICAL CARE FIRST HOUR: CPT | Performed by: ANESTHESIOLOGY

## 2023-06-26 PROCEDURE — 36415 COLL VENOUS BLD VENIPUNCTURE: CPT | Performed by: SURGERY

## 2023-06-26 PROCEDURE — 250N000013 HC RX MED GY IP 250 OP 250 PS 637: Performed by: SURGERY

## 2023-06-26 PROCEDURE — 85014 HEMATOCRIT: CPT | Performed by: INTERNAL MEDICINE

## 2023-06-26 PROCEDURE — 999N000157 HC STATISTIC RCP TIME EA 10 MIN

## 2023-06-26 PROCEDURE — 250N000013 HC RX MED GY IP 250 OP 250 PS 637: Performed by: HOSPITALIST

## 2023-06-26 PROCEDURE — 84132 ASSAY OF SERUM POTASSIUM: CPT | Performed by: HOSPITALIST

## 2023-06-26 PROCEDURE — 250N000009 HC RX 250: Performed by: RADIOLOGY

## 2023-06-26 PROCEDURE — 250N000011 HC RX IP 250 OP 636: Performed by: ANESTHESIOLOGY

## 2023-06-26 PROCEDURE — 94640 AIRWAY INHALATION TREATMENT: CPT | Mod: 76

## 2023-06-26 RX ORDER — NALOXONE HYDROCHLORIDE 0.4 MG/ML
0.4 INJECTION, SOLUTION INTRAMUSCULAR; INTRAVENOUS; SUBCUTANEOUS
Status: DISCONTINUED | OUTPATIENT
Start: 2023-06-26 | End: 2023-06-26

## 2023-06-26 RX ORDER — LIDOCAINE 40 MG/G
CREAM TOPICAL
Status: CANCELLED | OUTPATIENT
Start: 2023-06-26

## 2023-06-26 RX ORDER — METHYLPREDNISOLONE SODIUM SUCCINATE 125 MG/2ML
60 INJECTION, POWDER, LYOPHILIZED, FOR SOLUTION INTRAMUSCULAR; INTRAVENOUS 2 TIMES DAILY
Status: DISCONTINUED | OUTPATIENT
Start: 2023-06-26 | End: 2023-06-28

## 2023-06-26 RX ORDER — ACETAMINOPHEN 650 MG/1
650 SUPPOSITORY RECTAL EVERY 4 HOURS PRN
Status: DISCONTINUED | OUTPATIENT
Start: 2023-06-26 | End: 2023-07-28 | Stop reason: HOSPADM

## 2023-06-26 RX ORDER — FLUMAZENIL 0.1 MG/ML
0.2 INJECTION, SOLUTION INTRAVENOUS
Status: DISCONTINUED | OUTPATIENT
Start: 2023-06-26 | End: 2023-07-12 | Stop reason: HOSPADM

## 2023-06-26 RX ORDER — HYDRALAZINE HYDROCHLORIDE 25 MG/1
25 TABLET, FILM COATED ORAL EVERY 8 HOURS SCHEDULED
Status: DISCONTINUED | OUTPATIENT
Start: 2023-06-26 | End: 2023-07-02

## 2023-06-26 RX ORDER — ACETAMINOPHEN 325 MG/10.15ML
650 LIQUID ORAL EVERY 4 HOURS PRN
Status: DISCONTINUED | OUTPATIENT
Start: 2023-06-26 | End: 2023-07-28 | Stop reason: HOSPADM

## 2023-06-26 RX ORDER — NALOXONE HYDROCHLORIDE 0.4 MG/ML
0.2 INJECTION, SOLUTION INTRAMUSCULAR; INTRAVENOUS; SUBCUTANEOUS
Status: DISCONTINUED | OUTPATIENT
Start: 2023-06-26 | End: 2023-06-26

## 2023-06-26 RX ORDER — FENTANYL CITRATE 50 UG/ML
25-50 INJECTION, SOLUTION INTRAMUSCULAR; INTRAVENOUS EVERY 5 MIN PRN
Status: DISCONTINUED | OUTPATIENT
Start: 2023-06-26 | End: 2023-06-26

## 2023-06-26 RX ORDER — CLINDAMYCIN PHOSPHATE 900 MG/50ML
900 INJECTION, SOLUTION INTRAVENOUS EVERY 8 HOURS
Status: DISCONTINUED | OUTPATIENT
Start: 2023-06-26 | End: 2023-06-28

## 2023-06-26 RX ADMIN — VECURONIUM BROMIDE 1.4 MCG/KG/MIN: 1 INJECTION, POWDER, LYOPHILIZED, FOR SOLUTION INTRAVENOUS at 13:47

## 2023-06-26 RX ADMIN — INSULIN ASPART 1 UNITS: 100 INJECTION, SOLUTION INTRAVENOUS; SUBCUTANEOUS at 11:52

## 2023-06-26 RX ADMIN — IPRATROPIUM BROMIDE 0.5 MG: 0.5 SOLUTION RESPIRATORY (INHALATION) at 08:10

## 2023-06-26 RX ADMIN — MINERAL OIL AND WHITE PETROLATUM: 150; 830 OINTMENT OPHTHALMIC at 00:08

## 2023-06-26 RX ADMIN — DILTIAZEM HYDROCHLORIDE 60 MG: 30 TABLET, FILM COATED ORAL at 11:55

## 2023-06-26 RX ADMIN — MINERAL OIL AND WHITE PETROLATUM: 150; 830 OINTMENT OPHTHALMIC at 08:17

## 2023-06-26 RX ADMIN — APIXABAN 5 MG: 5 TABLET, FILM COATED ORAL at 20:13

## 2023-06-26 RX ADMIN — LORAZEPAM 0.5 MG: 2 INJECTION INTRAMUSCULAR; INTRAVENOUS at 06:37

## 2023-06-26 RX ADMIN — HYDRALAZINE HYDROCHLORIDE 10 MG: 20 INJECTION INTRAMUSCULAR; INTRAVENOUS at 09:54

## 2023-06-26 RX ADMIN — ACETAMINOPHEN 650 MG: 325 SOLUTION ORAL at 21:02

## 2023-06-26 RX ADMIN — SENNOSIDES 10 ML: 8.8 LIQUID ORAL at 17:32

## 2023-06-26 RX ADMIN — Medication 200 MCG/HR: at 18:17

## 2023-06-26 RX ADMIN — MINERAL OIL AND WHITE PETROLATUM: 150; 830 OINTMENT OPHTHALMIC at 16:03

## 2023-06-26 RX ADMIN — LEVALBUTEROL HYDROCHLORIDE 0.63 MG: 0.63 SOLUTION RESPIRATORY (INHALATION) at 11:09

## 2023-06-26 RX ADMIN — INSULIN ASPART 1 UNITS: 100 INJECTION, SOLUTION INTRAVENOUS; SUBCUTANEOUS at 08:07

## 2023-06-26 RX ADMIN — LEVALBUTEROL HYDROCHLORIDE 0.63 MG: 0.63 SOLUTION RESPIRATORY (INHALATION) at 08:10

## 2023-06-26 RX ADMIN — LIDOCAINE HYDROCHLORIDE 10 ML: 10 INJECTION, SOLUTION EPIDURAL; INFILTRATION; INTRACAUDAL; PERINEURAL at 15:03

## 2023-06-26 RX ADMIN — LORAZEPAM 0.5 MG: 2 INJECTION INTRAMUSCULAR; INTRAVENOUS at 10:52

## 2023-06-26 RX ADMIN — DOCUSATE SODIUM 100 MG: 50 LIQUID ORAL at 08:20

## 2023-06-26 RX ADMIN — HYDRALAZINE HYDROCHLORIDE 25 MG: 25 TABLET, FILM COATED ORAL at 13:39

## 2023-06-26 RX ADMIN — PROPOFOL 40 MCG/KG/MIN: 10 INJECTION, EMULSION INTRAVENOUS at 16:12

## 2023-06-26 RX ADMIN — CLINDAMYCIN PHOSPHATE 900 MG: 900 INJECTION, SOLUTION INTRAVENOUS at 21:11

## 2023-06-26 RX ADMIN — DILTIAZEM HYDROCHLORIDE 60 MG: 30 TABLET, FILM COATED ORAL at 17:30

## 2023-06-26 RX ADMIN — IPRATROPIUM BROMIDE 0.5 MG: 0.5 SOLUTION RESPIRATORY (INHALATION) at 19:40

## 2023-06-26 RX ADMIN — HYDRALAZINE HYDROCHLORIDE 25 MG: 25 TABLET, FILM COATED ORAL at 21:02

## 2023-06-26 RX ADMIN — METHYLPREDNISOLONE SODIUM SUCCINATE 125 MG: 125 INJECTION INTRAMUSCULAR; INTRAVENOUS at 08:27

## 2023-06-26 RX ADMIN — ATORVASTATIN CALCIUM 20 MG: 20 TABLET, FILM COATED ORAL at 08:21

## 2023-06-26 RX ADMIN — APIXABAN 5 MG: 5 TABLET, FILM COATED ORAL at 08:21

## 2023-06-26 RX ADMIN — Medication 200 MCG/HR: at 06:07

## 2023-06-26 RX ADMIN — IPRATROPIUM BROMIDE 0.5 MG: 0.5 SOLUTION RESPIRATORY (INHALATION) at 11:09

## 2023-06-26 RX ADMIN — POLYETHYLENE GLYCOL 3350 17 G: 17 POWDER, FOR SOLUTION ORAL at 17:31

## 2023-06-26 RX ADMIN — FUROSEMIDE 20 MG: 10 INJECTION, SOLUTION INTRAMUSCULAR; INTRAVENOUS at 00:00

## 2023-06-26 RX ADMIN — INSULIN ASPART 1 UNITS: 100 INJECTION, SOLUTION INTRAVENOUS; SUBCUTANEOUS at 16:03

## 2023-06-26 RX ADMIN — DILTIAZEM HYDROCHLORIDE 60 MG: 30 TABLET, FILM COATED ORAL at 23:57

## 2023-06-26 RX ADMIN — METHYLPREDNISOLONE SODIUM SUCCINATE 62.5 MG: 125 INJECTION INTRAMUSCULAR; INTRAVENOUS at 20:12

## 2023-06-26 RX ADMIN — LEVOTHYROXINE SODIUM 112 MCG: 0.11 TABLET ORAL at 08:21

## 2023-06-26 RX ADMIN — Medication 40 MG: at 08:24

## 2023-06-26 RX ADMIN — PAROXETINE 20 MG: 10 SUSPENSION ORAL at 08:25

## 2023-06-26 RX ADMIN — Medication 15 ML: at 08:21

## 2023-06-26 RX ADMIN — NICOTINE 1 PATCH: 14 PATCH, EXTENDED RELEASE TRANSDERMAL at 08:22

## 2023-06-26 RX ADMIN — DILTIAZEM HYDROCHLORIDE 60 MG: 30 TABLET, FILM COATED ORAL at 00:00

## 2023-06-26 RX ADMIN — PROPOFOL 40 MCG/KG/MIN: 10 INJECTION, EMULSION INTRAVENOUS at 03:08

## 2023-06-26 RX ADMIN — INSULIN ASPART 1 UNITS: 100 INJECTION, SOLUTION INTRAVENOUS; SUBCUTANEOUS at 20:14

## 2023-06-26 RX ADMIN — LEVALBUTEROL HYDROCHLORIDE 0.63 MG: 0.63 SOLUTION RESPIRATORY (INHALATION) at 19:40

## 2023-06-26 RX ADMIN — VECURONIUM BROMIDE 1.4 MCG/KG/MIN: 1 INJECTION, POWDER, LYOPHILIZED, FOR SOLUTION INTRAVENOUS at 06:04

## 2023-06-26 RX ADMIN — IPRATROPIUM BROMIDE 0.5 MG: 0.5 SOLUTION RESPIRATORY (INHALATION) at 15:29

## 2023-06-26 RX ADMIN — Medication 10 MG: at 06:30

## 2023-06-26 RX ADMIN — LEVALBUTEROL HYDROCHLORIDE 0.63 MG: 0.63 SOLUTION RESPIRATORY (INHALATION) at 15:29

## 2023-06-26 RX ADMIN — INSULIN ASPART 1 UNITS: 100 INJECTION, SOLUTION INTRAVENOUS; SUBCUTANEOUS at 03:52

## 2023-06-26 RX ADMIN — PROPOFOL 40 MCG/KG/MIN: 10 INJECTION, EMULSION INTRAVENOUS at 22:48

## 2023-06-26 RX ADMIN — SENNOSIDES 10 ML: 8.8 LIQUID ORAL at 06:20

## 2023-06-26 RX ADMIN — INSULIN ASPART 1 UNITS: 100 INJECTION, SOLUTION INTRAVENOUS; SUBCUTANEOUS at 00:15

## 2023-06-26 RX ADMIN — DILTIAZEM HYDROCHLORIDE 60 MG: 30 TABLET, FILM COATED ORAL at 06:13

## 2023-06-26 RX ADMIN — PROPOFOL 40 MCG/KG/MIN: 10 INJECTION, EMULSION INTRAVENOUS at 10:48

## 2023-06-26 ASSESSMENT — ACTIVITIES OF DAILY LIVING (ADL)
ADLS_ACUITY_SCORE: 32
ADLS_ACUITY_SCORE: 36
ADLS_ACUITY_SCORE: 32
ADLS_ACUITY_SCORE: 36
ADLS_ACUITY_SCORE: 36
ADLS_ACUITY_SCORE: 32
ADLS_ACUITY_SCORE: 36
ADLS_ACUITY_SCORE: 36
ADLS_ACUITY_SCORE: 32
ADLS_ACUITY_SCORE: 36
ADLS_ACUITY_SCORE: 32
ADLS_ACUITY_SCORE: 32

## 2023-06-26 NOTE — PROGRESS NOTES
[facility]    Hospitalist Progress Note  Name: Lara Melendez    MRN: [id number]  Provider:  Kody Prieto DO MPH  Date of Service: 06/26/2023    Summary of Stay: Lara Melendez is a 57 year old female with PMH including COPD, emphysema, as needed 2-3 L O2 at home, tobacco dependence, hypertension, anxiety, paroxysmal atrial fibrillation on Eliquis, hypothyroidism, psoriasis, and hyperlipidemia who presented on 6/21 with worsening shortness of breath and wheezing.  Symptom onset was a few days prior to presentation and consisting of wheezing so her pulmonologist prescribed azithromycin and prednisone.  She took about 3 days of these medications.  Due to continued decline she called EMS and was brought to the ED for evaluation.    In the emergency department she had a clear COPD exacerbation so was given IV steroids, IV magnesium, multiple nebulizers and placed on BiPAP.  Imaging showed no clear evidence of pneumonia but she remained on azithromycin.  She was admitted to the ICU.    The day following admission her respiratory condition deteriorated to the point that she required intubation and mechanical ventilation.  She had significant bronchospasm and high airway pressures so required deep sedation with fentanyl, ketamine and propofol with addition of vecuronium.  She was given a continuous albuterol nebulizer with not much improvement.  Pulmonology was consulted as well.  The case was even discussed with [facility] and she is not an ECMO candidate.    Problem List:   1. Acute on chronic hypoxic respiratory failure secondary to COPD exacerbation: At baseline she is on chronic oxygen of 2 to 3 L by nasal cannula.  She has had several admissions for severe COPD requiring BiPAP.  Initially during this hospitalization she was on BiPAP but she quickly deteriorated so was intubated 6/21.  She developed high airway pressures and bronchospasm so required deep sedation with fentanyl, ketamine, and propofol.   Vecuronium was added for increased chest compliance.  Continuous albuterol nebulizer for 24 hours provided no benefit and even seem to increase bronchospasm and has been discontinued.  Continue ventilator settings per intensivist and pulmonology.  Currently on IV methylprednisolone, scheduled nebulizers, and has completed 5 days of azithromycin.  Dr. Ovalles discussed the case with [facility] on she is not an ECMO candidate due to her chronic respiratory failure.  2. Paroxysmal atrial fibrillation: Currently rate controlled.  Continue diltiazem and Eliquis for stroke prophylaxis.  3. Hyperkalemia: Potassium bola to 5.5 and persisted in that range for a day or 2.  She received several doses of Lokelma and potassium level has now normalized.  Unclear etiology.  Renal function is normal.  Her PTA losartan has been on hold.  4. Steroid-induced hyperglycemia: Continue medium sliding scale insulin.  Reasonable glycemic control at this time with most blood sugars in the 150 range.  5. Lactic acidosis: I suspect this is due to nebulizers and hypoxia.  Blood pressure has been stable and even elevated the past day  6. Anxiety: Holding prior to admission clonazepam.  Continue paroxetine and as needed IV lorazepam.  7. Hypertension: Initially had some soft blood pressure but the past 24 hours has been quite hypertensive.  Continue PTA diltiazem.  Hesitant to resume losartan with recent issues involving hyperkalemia and hesitant to start beta-blockers with her severe reactive airway disease.  For now we will schedule hydralazine 25 mg 3 times daily.  As needed IV hydralazine available as needed.  8. Tobacco dependence: Nicotine replacement.  9. Malnutrition: RD consulted for of tube feeds and she has residuals of about 30.    DVT Prophylaxis: DOAC  Code Status: Full Code  Diet: NPO for Medical/Clinical Reasons Except for: Meds  Adult Formula Drip Feeding: Continuous Vital High Protein; Orogastric tube; Goal Rate: 15; mL/hr    Flora  Catheter: PRESENT, indication: Strict 1-2 Hour I&O;Deep Sedation/Paralysis  Disposition: Expected discharge in 3+ days to D. Goals prior to discharge include manage ICU issues.   Incidental Findings: As above.  Family updated today: Called and updated her  Merrill.    40 MINUTES SPENT BY ME on the date of service doing chart review, history, exam, documentation & further activities per the note.     Addendum: Small to moderate pneumothorax seen on chest x-ray, request thoracic surgery consultation.     Interval History   Intubated, sedated.  Overnight events reviewed.  Discussed with intensivist, RT, and RN.    -Data reviewed today: I personally reviewed all new labs and imaging results over the last 24 hours.     Physical Exam   Temp: 99.1  F (37.3  C) Temp src: Axillary BP: (!) 183/90 Pulse: 107   Resp: 20 SpO2: 92 % O2 Device: Mechanical Ventilator    Vitals:    06/24/23 0600 06/25/23 0530 06/26/23 0615   Weight: 73.7 kg (162 lb 7.7 oz) 77 kg (169 lb 12.1 oz) 76.4 kg (168 lb 6.9 oz)     Vital Signs with Ranges  Temp:  [97.8  F (36.6  C)-99.6  F (37.6  C)] 99.1  F (37.3  C)  Pulse:  [] 107  Resp:  [20-24] 20  BP: (133-183)/(56-90) 183/90  MAP:  [78 mmHg-116 mmHg] 95 mmHg  Arterial Line BP: (126-196)/(51-76) 160/64  FiO2 (%):  [45 %-55 %] 55 %  SpO2:  [87 %-100 %] 92 %  I/O last 3 completed shifts:  In: 2310.72 [I.V.:1105.72; NG/GT:845]  Out: 4755 [Urine:4755]    GENERAL: No apparent distress. Intubated, sedated.  HEENT: Normocephalic, atraumatic.   CARDIOVASCULAR: Regular rate and rhythm without murmurs or rubs. No S3.  PULMONARY: Coarse wheezing bilaterally.  GASTROINTESTINAL: Soft, non-tender, non-distended. Bowel sounds normoactive.   EXTREMITIES: No cyanosis or clubbing. No edema.  NEUROLOGICAL: Sedated.  DERMATOLOGICAL: No rash, ulcer, bruising, nor jaundice.     Medications     fentaNYL 200 mcg/hr (06/26/23 0801)     ketamine 53.6 mg/hr (06/26/23 0801)     lactated ringers 10 mL/hr at 06/26/23  0802     propofol 40 mcg/kg/min (06/26/23 0852)    And     - MEDICATION INSTRUCTIONS -       norepinephrine Stopped (06/24/23 0134)     - MEDICATION INSTRUCTIONS -       vecuronium (NORCURON) 1 mg/mL in D5W 50 mL 1.4 mcg/kg/min (06/26/23 0803)       apixaban ANTICOAGULANT  5 mg Per Feeding Tube BID     artificial tears   Both Eyes Q8H     atorvastatin  20 mg Per Feeding Tube Daily     diltiazem  60 mg Oral or Feeding Tube Q6H ELZBIETA     hydrALAZINE  25 mg Oral Q8H ELZBIETA     insulin aspart  1-6 Units Subcutaneous Q4H     ipratropium  0.5 mg Nebulization 4x daily     levalbuterol  0.63 mg Nebulization 4x Daily     levothyroxine  112 mcg Per Feeding Tube Daily     methylPREDNISolone  125 mg Intravenous BID     multivitamins w/minerals  15 mL Per Feeding Tube Daily     nicotine  1 patch Transdermal Daily     nicotine   Transdermal Q8H     pantoprazole  40 mg Per Feeding Tube Daily     PARoxetine  20 mg Per Feeding Tube Daily     protein modular  1 packet Per Feeding Tube BID     sodium chloride (PF)  10-40 mL Intracatheter Q8H     sodium chloride (PF)  3 mL Intracatheter Q8H     Data     Laboratory:  Recent Labs   Lab 06/26/23  0352 06/25/23  0357 06/24/23  0407   WBC 8.7 10.7 12.8*   HGB 9.8* 10.2* 9.8*   HCT 31.9* 32.2* 30.9*   * 105* 105*    168 171     Recent Labs   Lab 06/26/23  0807 06/26/23  0352 06/26/23  0350 06/26/23  0015 06/25/23  2210 06/25/23  1557 06/25/23  1404 06/25/23  0358 06/25/23  0357 06/24/23  0751 06/24/23  0407   NA  --  147*  --   --   --   --   --   --  139  --  137   POTASSIUM  --  4.3  4.3  --   --  4.4  --  4.8   < > 5.6*   < > 5.5*   CHLORIDE  --  101  --   --   --   --   --   --  101  --  101   CO2  --  38*  --   --   --   --   --   --  32*  --  31*   ANIONGAP  --  8  --   --   --   --   --   --  6*  --  5*   * 170* 158*   < >  --    < >  --    < > 158*   < > 171*   BUN  --  49.9*  --   --   --   --   --   --  48.2*  --  39.1*   CR  --  0.67  --   --   --   --   --   --   0.87  --  0.97*   GFRESTIMATED  --  >90  --   --   --   --   --   --  76  --  67   EDIN  --  8.8  --   --   --   --   --   --  8.9  --  8.5*    < > = values in this interval not displayed.     No results for input(s): CULT in the last 168 hours.    Imaging:  No results found for this or any previous visit (from the past 24 hour(s)).      Kody Prieto DO MPH  [facility] Hospitalist  [address] [place] [place] [address]  06/26/2023

## 2023-06-26 NOTE — PROGRESS NOTES
Critical Care  Note      06/26/2023    Name: Lara Melendez MRN#: [id number]   Age: 57 year old YOB: 1965     Hsptl Day# 5  ICU DAY # 5    MV DAY # 5             Problem List:   Principal Problem:    Paroxysmal atrial fibrillation with RVR (H)  Active Problems:    COPD exacerbation (H)    Anticoagulated    Acute and chronic respiratory failure with hypercapnia (H)           Summary/Hospital Course:   57-year-old female with history of COPD, emphysema, O2 dependent at home, tobacco dependence, hypertension, anxiety, atrial fibrillation on Eliquis, hypothyroidism presents with worsening shortness of breath and wheezing.  She appears to be in status asthmaticus versus worsening COPD exacerbation.  Patient was prescribed azithromycin and prednisone prior to admission.  She continued decline was brought to EMS for evaluation.  Where she was given IV steroids, IV magnesium nebulizers and BiPAP.  After admission to the ICU she failed a BiPAP trial and required intubation mechanical ventilation.  She had significant bronchospasm with high airway pressures requiring deep sedation with fentanyl ketamine and propofol and addition of a paralytic.  Her airway pressures have slowly improved on his current regimen and she developed a small left-sided pneumothorax for which we have consulted interventional radiology to place a chest tube.          Assessment and plan :     Lara Melendez IS a 57 year old female admitted on 6/20/2023 for, acute respiratory failure secondary to COPD exacerbation, small left-sided pneumothorax and history of atrial fibrillation on Eliquis..   I have personally reviewed the daily labs, imaging studies, cultures and discussed the case with referring physician and consulting physicians.     My assessment and plan by system for this patient is as follows:    Neurology/Psychiatry:   1.  Continues require deep sedation given her dyssynchrony with the vent  2.  Continue ketamine for possible  bronchodilatation  3.  Paralytic for vent synchrony    Plan  We will continue the patient on the current regimen at this time. BIS  monitoring is less than 40 so appropriate level of sedation while on paralytic.  Continue current regimen for deep sedation    Cardiovascular:   1.Hemodynamics -normotensive to hypertensive  2.Rhythm -sinus rhythm but patient does have history of atrial fibrillation on presentation  3. Ischemia -no signs of ischemia  Plan  Patient does have a history of atrial fibrillation, appears to be in sinus tach at this time.  May be related to ketamine usage.  Continue to monitor heart rate, continue Eliquis for patient's history of atrial fibrillation.  We will continue to treat patient's hypertension with hydralazine as needed.    Pulmonary/Ventilator Management:   1. Airway intubated for acute ventilatory failure  2. Oxygenation/ventilation/mechanics on full mechanical ventilatory support with 50% FiO2 and 8 of PEEP.  Plan  -Continue patient on current regimen, continue patient on full mechanical ventilatory support.  Of concern small pneumothorax seen on left.  Discussed with thoracic surgery best plan at this time would be to get ultrasound-guided chest tube placed by IR given patient's Eliquis use and small pneumothorax.  Airway pressures are slightly better today.  We will decrease methylprednisolone by half today.  Continue to monitor peak and plateau airway pressures best as possible given patient's bronchospasm.    GI and Nutrition :   1.  Concern for protein calorie malnutrition    Plan  -Continue patient on enteral feeds, may have to hold enteral feeds for placement of chest tube    Renal/Fluids/Electrolytes:   1.  No acute renal injury at this time  2.  Mild hypernatremia  3.  Compensated respiratory acidosis  4.  Appears euvolemic  Plan  -We will continue to monitor, hyperkalemia seems to be resolving  - monitor function and electrolytes as needed with replacement per ICU protocols.  - generally avoid nephrotoxic agents such as NSAID, IV contrast unless specifically required  - adjust medications as needed for renal clearance  - follow I/O's as appropriate.  -Continue to observe hypernatremia may need to increase free water     Infectious Disease:   1.  No active issues    Plan  -Continue to monitor    Endocrine:   1.  Concern for stress-induced hyperglycemia  2.  Concern for diabetes induced hyperglycemia  3.  Plan  - ICU insulin protocol, goal sugar <180      Hematology/Oncology:   1.  No leukocytosis  2.  Mild anemia most likely secondary to hemodilution  3.  On Eliquis for atrial fibrillation  Plan  -Continue to monitor     IV/Access:   1. Venous access -central access  2. Arterial access -arterial line  3.  Plan  - central access required and necessary      ICU Prophylaxis:   1. DVT: On Eliquis  2. VAP: HOB 30 degrees, chlorhexidine rinse  3. Stress Ulcer: PPI/H2 blocker  4. Restraints: Nonviolent soft two point restraints required and necessary for patient safety and continued cares and good effect as patient continues to pull at necessary lines, tubes despite education and distraction. Will readdress daily.   5. Wound care  -local wound care  6. Feeding -continue tube feeds  7. Family Update: I did not update family today  8. Disposition -ICU        Key goals for next 24 hours:   1.  Chest tube by IR  2.  Wean steroids  3.  Consider decreasing paralytic once chest tube placed               Medical History:     Past Medical History:   Diagnosis Date     Anxiety      COPD (chronic obstructive pulmonary disease) - 2L home O2      Diverticulitis of colon      Hypercholesterolemia      Hypertension      Hypothyroidism      Paroxysmal atrial fibrillation      Psoriasis      Pulmonary nodule - left upper lobe      Past Surgical History:   Procedure Laterality Date     CHOLECYSTECTOMY       INCISION AND DRAINAGE MANDIBLE, COMBINED Left 01/10/2022    Procedure: INCISION AND DRAINAGE, MANDIBLE;   Surgeon: Jn Feliz DDS;  Location: [facility] OR     INCISION AND DRAINAGE MANDIBLE, COMBINED Left 02/04/2022    Procedure: INCISION AND DRAINAGE, MANDIBLE;  Surgeon: Taylor Ortez DDS;  Location: [facility] OR     TOOTH EXTRACTION       Vocal Cord surgery       Social History     Socioeconomic History     Marital status:      Spouse name: Not on file     Number of children: Not on file     Years of education: Not on file     Highest education level: Not on file   Occupational History     Not on file   Tobacco Use     Smoking status: Never     Smokeless tobacco: Never   Substance and Sexual Activity     Alcohol use: Yes     Comment: occ     Drug use: Never     Sexual activity: Not Currently   Other Topics Concern     Not on file   Social History Narrative     Not on file     Social Determinants of Health     Financial Resource Strain: Not on file   Food Insecurity: Not on file   Transportation Needs: Not on file   Physical Activity: Not on file   Stress: Not on file   Social Connections: Not on file   Intimate Partner Violence: Not on file   Housing Stability: Not on file        Allergies   Allergen Reactions     Sulfa Antibiotics      Doxycycline Other (See Comments)     Develops chest tightness  Intolerance not allergy     Penicillins Rash     Generalized rash  Rash, Generalized                Key Medications:       apixaban ANTICOAGULANT  5 mg Per Feeding Tube BID     artificial tears   Both Eyes Q8H     atorvastatin  20 mg Per Feeding Tube Daily     diltiazem  60 mg Oral or Feeding Tube Q6H ELZBIETA     hydrALAZINE  25 mg Oral Q8H ELZBIETA     insulin aspart  1-6 Units Subcutaneous Q4H     ipratropium  0.5 mg Nebulization 4x daily     levalbuterol  0.63 mg Nebulization 4x Daily     levothyroxine  112 mcg Per Feeding Tube Daily     methylPREDNISolone  62.5 mg Intravenous BID     multivitamins w/minerals  15 mL Per Feeding Tube Daily     nicotine  1 patch Transdermal Daily     nicotine   Transdermal Q8H     pantoprazole   40 mg Per Feeding Tube Daily     PARoxetine  20 mg Per Feeding Tube Daily     protein modular  1 packet Per Feeding Tube BID     sodium chloride (PF)  10-40 mL Intracatheter Q8H     sodium chloride (PF)  3 mL Intracatheter Q8H       fentaNYL 200 mcg/hr (06/26/23 1000)     ketamine 53.6 mg/hr (06/26/23 1000)     lactated ringers 10 mL/hr at 06/26/23 0802     propofol 40 mcg/kg/min (06/26/23 1048)    And     - MEDICATION INSTRUCTIONS -       norepinephrine Stopped (06/24/23 0134)     - MEDICATION INSTRUCTIONS -       vecuronium (NORCURON) 1 mg/mL in D5W 50 mL 1.4 mcg/kg/min (06/26/23 1000)        Home Meds  No current facility-administered medications on file prior to encounter.  albuterol (PROAIR HFA/PROVENTIL HFA/VENTOLIN HFA) 108 (90 Base) MCG/ACT inhaler, Inhale 2 puffs into the lungs every 4 hours as needed for shortness of breath / dyspnea or wheezing Inhale 1-2 Puffs every 4 hours as needed for Wheezing.  albuterol (PROVENTIL) (2.5 MG/3ML) 0.083% neb solution, Take 1 vial (2.5 mg) by nebulization every 4 hours as needed for shortness of breath or wheezing  apixaban ANTICOAGULANT (ELIQUIS) 5 MG tablet, Take 1 tablet (5 mg) by mouth 2 times daily  atorvastatin (LIPITOR) 20 MG tablet, Take 20 mg by mouth daily  clonazePAM (KLONOPIN) 0.5 MG tablet, Take 0.5 mg by mouth daily  cyclobenzaprine (FLEXERIL) 5 MG tablet, Take 1 tablet (5 mg) by mouth every 8 hours as needed for muscle spasms  diclofenac (VOLTAREN) 1 % topical gel, Apply 2 g topically 4 times daily  diltiazem ER COATED BEADS (CARDIZEM CD/CARTIA XT) 240 MG 24 hr capsule, Take 1 capsule (240 mg) by mouth daily  fluticasone-salmeterol (ADVAIR-HFA) 230-21 MCG/ACT inhaler, Inhale 2 puffs into the lungs 2 times daily  ipratropium - albuterol 0.5 mg/2.5 mg/3 mL (DUONEB) 0.5-2.5 (3) MG/3ML neb solution, Take 1 vial (3 mLs) by nebulization every 6 hours as needed for shortness of breath / dyspnea or wheezing  levothyroxine (SYNTHROID/LEVOTHROID) 112 MCG tablet,  Take 112 mcg by mouth daily  losartan (COZAAR) 25 MG tablet, Take 1 tablet (25 mg) by mouth daily  nicotine (NICODERM CQ) 21 MG/24HR 24 hr patch, Place 1 patch onto the skin daily  PARoxetine (PAXIL) 20 MG tablet, Take 20 mg by mouth daily  predniSONE (DELTASONE) 20 MG tablet, Take 60 mg for 2 days, then 40 mg for 3 days, then 20 mg for 3 days, then 10 mg for 3 days, then stop  tiotropium (SPIRIVA RESPIMAT) 2.5 MCG/ACT inhaler, Inhale 2 puffs into the lungs daily as needed               Physical Examination:   Temp:  [97.8  F (36.6  C)-99.1  F (37.3  C)] 99.1  F (37.3  C)  Pulse:  [] 123  Resp:  [20] 20  BP: (133-183)/(56-90) 183/90  MAP:  [78 mmHg-116 mmHg] 104 mmHg  Arterial Line BP: (126-196)/(51-76) 172/70  FiO2 (%):  [50 %-55 %] 55 %  SpO2:  [87 %-100 %] 93 %    Intake/Output Summary (Last 24 hours) at 6/26/2023 1215  Last data filed at 6/26/2023 1200  Gross per 24 hour   Intake 2195.19 ml   Output 4555 ml   Net -2359.81 ml     Wt Readings from Last 4 Encounters:   06/26/23 76.4 kg (168 lb 6.9 oz)   04/01/23 68.8 kg (151 lb 9.6 oz)   02/11/23 60 kg (132 lb 4.4 oz)   12/02/22 61.3 kg (135 lb 2.3 oz)     Arterial Line BP: (126-196)/(51-76) 172/70  MAP:  [78 mmHg-116 mmHg] 104 mmHg  BP - Mean:  [112-127] 127  Vent Mode: CMV/AC  (Continuous Mandatory Ventilation/ Assist Control)  FiO2 (%): 55 %  Resp Rate (Set): 20 breaths/min  Tidal Volume (Set, mL): 270 mL  PEEP (cm H2O): 8 cmH2O  Resp: 20    Recent Labs   Lab 06/26/23  0351 06/25/23  0357 06/24/23  1809 06/24/23  0406   PH 7.36 7.30* 7.29* 7.27*   PCO2 76* 74* 71* 71*   PO2 92 100 74* 98   HCO3 43* 36* 34* 33*   O2PER 50 50 40 40       GEN: no acute distress consistent with chemical sedation  HEENT: head ncat, sclera anicteric, OP patent, trachea midline   PULM: unlabored synchronous with vent, diffuse wheezes anteriorly  CV/COR: RRR S1S2 no gallop,  No rub, no murmur  ABD: soft nontender, hypoactive bowel sounds, no mass  EXT:  Edema   warm  NEURO: Does  not follow commands, consistent with chemical sedation  SKIN: no obvious rash  LINES: clean, dry intact         Data:   All data and imaging reviewed     ROUTINE ICU LABS (Last four results)  CMP  Recent Labs   Lab 06/26/23  1151 06/26/23  0807 06/26/23  0352 06/26/23  0350 06/26/23  0015 06/25/23  2210 06/25/23  1557 06/25/23  1404 06/25/23  1205 06/25/23  0820 06/25/23  0358 06/25/23  0357 06/24/23  1151 06/24/23  0902 06/24/23  0751 06/24/23  0407 06/23/23  0744 06/23/23  0550 06/21/23  0007 06/21/23  0003   NA  --   --  147*  --   --   --   --   --   --   --   --  139  --   --   --  137  --  137   < > 138   POTASSIUM  --   --  4.3  4.3  --   --  4.4  --  4.8  --  4.9  --  5.6*   < > 5.5*  --  5.5*  --  4.3   < > 5.3   CHLORIDE  --   --  101  --   --   --   --   --   --   --   --  101  --   --   --  101  --  99   < > 99   CO2  --   --  38*  --   --   --   --   --   --   --   --  32*  --   --   --  31*  --  31*   < > 28   ANIONGAP  --   --  8  --   --   --   --   --   --   --   --  6*  --   --   --  5*  --  7   < > 11   * 151* 170* 158*   < >  --    < >  --    < >  --    < > 158*   < >  --    < > 171*   < > 174*   < > 126*   BUN  --   --  49.9*  --   --   --   --   --   --   --   --  48.2*  --   --   --  39.1*  --  20.0   < > 7.9*   CR  --   --  0.67  --   --   --   --   --   --   --   --  0.87  --   --   --  0.97*  --  0.97*   < > 0.63   GFRESTIMATED  --   --  >90  --   --   --   --   --   --   --   --  76  --   --   --  67  --  67   < > >90   EDIN  --   --  8.8  --   --   --   --   --   --   --   --  8.9  --   --   --  8.5*  --  8.7   < > 8.9   MAG  --   --  2.4*  --   --   --   --   --   --   --   --  2.6*  --  2.6*  --   --   --  2.8*   < > 2.5*   PHOS  --   --  3.8  --   --   --   --   --   --   --   --  4.7*  --  5.2*  --   --   --  5.8*   < >  --    PROTTOTAL  --   --   --   --   --   --   --   --   --   --   --   --   --   --   --   --   --   --   --  7.5   ALBUMIN  --   --   --   --   --   --   --    --   --   --   --   --   --   --   --   --   --   --   --  4.5   BILITOTAL  --   --   --   --   --   --   --   --   --   --   --   --   --   --   --   --   --   --   --  0.2   ALKPHOS  --   --   --   --   --   --   --   --   --   --   --   --   --   --   --   --   --   --   --  76   AST  --   --   --   --   --   --   --   --   --   --   --   --   --   --   --   --   --   --   --  34   ALT  --   --   --   --   --   --   --   --   --   --   --   --   --   --   --   --   --   --   --  44    < > = values in this interval not displayed.     CBC  Recent Labs   Lab 06/26/23 0352 06/25/23 0357 06/24/23  0407 06/23/23  2207 06/23/23  1351 06/23/23  0550   WBC 8.7 10.7 12.8*  --   --  14.2*   RBC 2.96* 3.06* 2.94*  --   --  3.28*   HGB 9.8* 10.2* 9.8* 10.7*   < > 10.8*  10.8*   HCT 31.9* 32.2* 30.9*  --   --  35.1   * 105* 105*  --   --  107*   MCH 33.1* 33.3* 33.3*  --   --  32.9   MCHC 30.7* 31.7 31.7  --   --  30.8*   RDW 12.9 12.6 13.1  --   --  13.2    168 171  --   --  205    < > = values in this interval not displayed.     INR  Recent Labs   Lab 06/21/23  0003   INR 1.04     Arterial Blood Gas  Recent Labs   Lab 06/26/23 0351 06/25/23 0357 06/24/23  1809 06/24/23  0406   PH 7.36 7.30* 7.29* 7.27*   PCO2 76* 74* 71* 71*   PO2 92 100 74* 98   HCO3 43* 36* 34* 33*   O2PER 50 50 40 40       All cultures:  No results for input(s): CULT in the last 168 hours.  Recent Results (from the past 24 hour(s))   XR Chest Port 1 View    Narrative    CHEST ONE VIEW  6/26/2023 9:29 AM     HISTORY: Increasing oxygen requirements and airway pressure.    COMPARISON: June 23, 2023      Impression    IMPRESSION: Endotracheal tube tip 4.3 cm from the stevie. Right PICC  line stable. Minimal pleural fluid or pleural thickening bilaterally  similar to previous. Small-to-moderate pneumothorax on the left new  from previous.    Results called to the patient's nurse on June 26, 2023 at 9:44 AM.     GHAZALA ORELLANA MD          SYSTEM ID:  [id number]         Billing: This patient is critically ill: yes. Total critical care time today 42 min.          Interval chest x-ray shows no worsening of pneumothorax.  Appreciate IR input.  Patient scheduled for chest tube today.

## 2023-06-26 NOTE — CONSULTS
THORACIC SURGERY CONSULT NOTE    Consult Reason: Pneumothorax    HPI: This patient is a 57 year old woman in respiratory failure on the ventilator. She has known emphysema and was found to have a left pneumothorax on chest x-ray today. Her peak airway pressures are high but stable over several days.    A/P: Patient is a 57 year old female with a left pneumothorax in the setting of emphysema and mechanical ventilation  -Image-guided chest tube placement and then to suction.    Patient and plan discussed with Dr. Madrigal    Thank you for the opportunity to participate in the care of this patient.    PMH:  Past Medical History:   Diagnosis Date     Anxiety      COPD (chronic obstructive pulmonary disease) - 2L home O2      Diverticulitis of colon      Hypercholesterolemia      Hypertension      Hypothyroidism      Paroxysmal atrial fibrillation      Psoriasis      Pulmonary nodule - left upper lobe      Reviewed, as above    PSH:  Past Surgical History:   Procedure Laterality Date     CHOLECYSTECTOMY       INCISION AND DRAINAGE MANDIBLE, COMBINED Left 01/10/2022    Procedure: INCISION AND DRAINAGE, MANDIBLE;  Surgeon: Jn Feliz DDS;  Location: [facility] OR     INCISION AND DRAINAGE MANDIBLE, COMBINED Left 02/04/2022    Procedure: INCISION AND DRAINAGE, MANDIBLE;  Surgeon: Taylor Ortez DDS;  Location: [facility] OR     TOOTH EXTRACTION       Vocal Cord surgery       Reviewed, as above      Allergies:  Allergies   Allergen Reactions     Sulfa Antibiotics      Doxycycline Other (See Comments)     Develops chest tightness  Intolerance not allergy     Penicillins Rash     Generalized rash  Rash, Generalized         Home Meds:  Medications Prior to Admission   Medication Sig Dispense Refill Last Dose     albuterol (PROAIR HFA/PROVENTIL HFA/VENTOLIN HFA) 108 (90 Base) MCG/ACT inhaler Inhale 2 puffs into the lungs every 4 hours as needed for shortness of breath / dyspnea or wheezing Inhale 1-2 Puffs every 4 hours as needed for  Wheezing. 18 g 0      albuterol (PROVENTIL) (2.5 MG/3ML) 0.083% neb solution Take 1 vial (2.5 mg) by nebulization every 4 hours as needed for shortness of breath or wheezing 30 mL 0      apixaban ANTICOAGULANT (ELIQUIS) 5 MG tablet Take 1 tablet (5 mg) by mouth 2 times daily 60 tablet 0      atorvastatin (LIPITOR) 20 MG tablet Take 20 mg by mouth daily        clonazePAM (KLONOPIN) 0.5 MG tablet Take 0.5 mg by mouth daily        cyclobenzaprine (FLEXERIL) 5 MG tablet Take 1 tablet (5 mg) by mouth every 8 hours as needed for muscle spasms 30 tablet 0      diclofenac (VOLTAREN) 1 % topical gel Apply 2 g topically 4 times daily 100 g 0      diltiazem ER COATED BEADS (CARDIZEM CD/CARTIA XT) 240 MG 24 hr capsule Take 1 capsule (240 mg) by mouth daily 30 capsule 0      fluticasone-salmeterol (ADVAIR-HFA) 230-21 MCG/ACT inhaler Inhale 2 puffs into the lungs 2 times daily 12 g 0      ipratropium - albuterol 0.5 mg/2.5 mg/3 mL (DUONEB) 0.5-2.5 (3) MG/3ML neb solution Take 1 vial (3 mLs) by nebulization every 6 hours as needed for shortness of breath / dyspnea or wheezing 90 mL 1      levothyroxine (SYNTHROID/LEVOTHROID) 112 MCG tablet Take 112 mcg by mouth daily        losartan (COZAAR) 25 MG tablet Take 1 tablet (25 mg) by mouth daily 30 tablet 1      nicotine (NICODERM CQ) 21 MG/24HR 24 hr patch Place 1 patch onto the skin daily 28 patch 0      PARoxetine (PAXIL) 20 MG tablet Take 20 mg by mouth daily        predniSONE (DELTASONE) 20 MG tablet Take 60 mg for 2 days, then 40 mg for 3 days, then 20 mg for 3 days, then 10 mg for 3 days, then stop 20 tablet 0      tiotropium (SPIRIVA RESPIMAT) 2.5 MCG/ACT inhaler Inhale 2 puffs into the lungs daily as needed            Physical Exam:  Temp:  [97.8  F (36.6  C)-100  F (37.8  C)] 100  F (37.8  C)  Pulse:  [] 116  Resp:  [20] 20  BP: (133-183)/(56-90) 183/90  MAP:  [78 mmHg-116 mmHg] 105 mmHg  Arterial Line BP: (126-196)/(51-78) 167/73  FiO2 (%):  [50 %-65 %] 65 %  SpO2:   [90 %-100 %] 92 %    Gen: Intubated, sedated  Vent Mode: CMV/AC  (Continuous Mandatory Ventilation/ Assist Control)  FiO2 (%): 65 %  Resp Rate (Set): 20 breaths/min  Tidal Volume (Set, mL): 270 mL  PEEP (cm H2O): 8 cmH2O  Resp: 20       Labs:  ABG   Recent Labs   Lab 06/26/23  0351 06/25/23  0357 06/24/23  1809 06/24/23  0406   PH 7.36 7.30* 7.29* 7.27*   PCO2 76* 74* 71* 71*   PO2 92 100 74* 98   HCO3 43* 36* 34* 33*     CBC  Recent Labs   Lab 06/26/23  0352 06/25/23  0357 06/24/23  0407 06/23/23  2207 06/23/23  1351 06/23/23  0550   WBC 8.7 10.7 12.8*  --   --  14.2*   HGB 9.8* 10.2* 9.8* 10.7*   < > 10.8*  10.8*    168 171  --   --  205    < > = values in this interval not displayed.     BMP  Recent Labs   Lab 06/26/23  1151 06/26/23  0807 06/26/23  0352 06/26/23  0350 06/26/23  0015 06/25/23  2210 06/25/23  1557 06/25/23  1404 06/25/23  1205 06/25/23  0820 06/25/23  0358 06/25/23  0357 06/24/23  0751 06/24/23  0407 06/23/23  0744 06/23/23  0550   NA  --   --  147*  --   --   --   --   --   --   --   --  139  --  137  --  137   POTASSIUM  --   --  4.3  4.3  --   --  4.4  --  4.8  --  4.9  --  5.6*   < > 5.5*  --  4.3   CHLORIDE  --   --  101  --   --   --   --   --   --   --   --  101  --  101  --  99   CO2  --   --  38*  --   --   --   --   --   --   --   --  32*  --  31*  --  31*   BUN  --   --  49.9*  --   --   --   --   --   --   --   --  48.2*  --  39.1*  --  20.0   CR  --   --  0.67  --   --   --   --   --   --   --   --  0.87  --  0.97*  --  0.97*   * 151* 170* 158*   < >  --    < >  --    < >  --    < > 158*   < > 171*   < > 174*    < > = values in this interval not displayed.     LFT  Recent Labs   Lab 06/21/23  0003   AST 34   ALT 44   ALKPHOS 76   BILITOTAL 0.2   ALBUMIN 4.5   INR 1.04     Imaging:  CXR 6/26:  Left pneumothorax          Rashid Enamorado MD

## 2023-06-26 NOTE — PROGRESS NOTES
"[facility]  Respiratory Care Note    Critical access hospital ICU VENTILATOR RESPIRATORY NOTE  Date of Admission: 06/20/2023  Date of Intubation (most recent): 06/21/2023  Reason for Mechanical Ventilation: Respiratory Failure  Number of Days on Mechanical Ventilation: 6  Met Criteria for Pressure Support Trial: No  Length of Pressure Support Trial:   Reason for Stopping Pressure Support Trial:   Reason for No Pressure Support Trial: On a Paralytic  Significant Events Today:   ABG Results: 7.36/76/92/43 @0351 06/26/2023  ETT appearance on chest x-ray: 4.6 cm above Fall[place]    Plan: Will continue to monitor and assess the pt's current respiratory status and needs, in hopes of liberating her from the ventilator.    Vent Mode: CMV/AC  (Continuous Mandatory Ventilation/ Assist Control)  FiO2 (%): 65 %  Resp Rate (Set): 20 breaths/min  Tidal Volume (Set, mL): 270 mL  PEEP (cm H2O): 8 cmH2O  Resp: 20    Vital signs:  Temp: 100.4  F (38  C) Temp src: Axillary BP: (!) 183/90 Pulse: (!) 126   Resp: 20 SpO2: 93 % O2 Device: Mechanical Ventilator   Height: 160 cm (5' 3\") Weight: 76.4 kg (168 lb 6.9 oz)  Estimated body mass index is 29.84 kg/m  as calculated from the following:    Height as of this encounter: 1.6 m (5' 3\").    Weight as of this encounter: 76.4 kg (168 lb 6.9 oz).      Recent Labs   Lab 06/26/23  0351 06/25/23  0357 06/24/23  1809 06/24/23  0406   PH 7.36 7.30* 7.29* 7.27*   PCO2 76* 74* 71* 71*   PO2 92 100 74* 98   HCO3 43* 36* 34* 33*   O2PER 50 50 40 40     Past Medical History:   Diagnosis Date     Anxiety      COPD (chronic obstructive pulmonary disease) - 2L home O2      Diverticulitis of colon      Hypercholesterolemia      Hypertension      Hypothyroidism      Paroxysmal atrial fibrillation      Psoriasis      Pulmonary nodule - left upper lobe        Past Surgical History:   Procedure Laterality Date     CHOLECYSTECTOMY       INCISION AND DRAINAGE MANDIBLE, COMBINED Left 01/10/2022    " Procedure: INCISION AND DRAINAGE, MANDIBLE;  Surgeon: Jn Feliz DDS;  Location: [facility] OR     INCISION AND DRAINAGE MANDIBLE, COMBINED Left 02/04/2022    Procedure: INCISION AND DRAINAGE, MANDIBLE;  Surgeon: Taylor Ortez DDS;  Location: [facility] OR     TOOTH EXTRACTION       Vocal Cord surgery         Family History   Problem Relation Age of Onset     Deep Vein Thrombosis (DVT) Mother      Hypertension Mother        Social History     Tobacco Use     Smoking status: Never     Smokeless tobacco: Never   Substance Use Topics     Alcohol use: Yes     Comment: stevan FREDERICK  [facility]  6/26/2023

## 2023-06-26 NOTE — IR NOTE
CT CHEST TUBE WITH CATH PLACEMENT  6/26/2023 3:14 PM     HISTORY:  Patient is intubated and has a spontaneous left pneumothorax.    COMPARISON: Chest x-ray dated 6/26/2023    FINDINGS: After obtaining informed consent, the patient was placed in a supine position on the CT table. The left anterior chest was prepped and draped in the usual sterile manner. 1% lidocaine was injected for local anesthesia. Under CT guidance, using a 5 Somali Yueh needle, access into the left pleural space was obtained. A wire was curled in the pleural space. Over the wire, an 8 Somali locking pigtail catheter was placed. Air was suctioned out of the left thorax. Follow-up CT scan showed interval decrease in size of the left pneumothorax. The catheter was sutured to the patient's skin and hooked to a pleura vac suction apparatus.    The patient tolerated the procedure well. There were no immediate postprocedure complications.    IMPRESSION: CT-guided left chest tube placed as above. Radiation dose for this scan was reduced using automated exposure control, adjustment of the mA and/or kV according to patient size, or iterative reconstruction technique.

## 2023-06-26 NOTE — PROGRESS NOTES
Left chest tube placed with image guidance by Dr. Hermosillo. Patient in radiology with RT, flyer and primary RN, vital signs monitored continuously via portable monitor. Lidocaine used for local anesthetic. 8F chest tube placed. Patient back to floor with RNs and RT.

## 2023-06-26 NOTE — PLAN OF CARE
Patient remains intubated, sedated, on paralytic. Tolerating tubed feeds. Adequate urine output.   Patient remains hypertensive and tachycardic-MD's aware. Tube feed rate advanced. DT placed in CT.   Continue to monitor. Advance tube feeds as ordered.   Discharge plan to be determined.

## 2023-06-26 NOTE — PROVIDER NOTIFICATION
Tele hub notified of more frequent runs of SVT. Very brief and self limited. Electrolytes this am WNL. Had previously been having 2-4 brief runs per 12 hour shift. Now more frequent. 2-3 brief runs within the last hour.

## 2023-06-26 NOTE — PROGRESS NOTES
Critical access hospital ICU VENTILATOR RESPIRATORY NOTE  Date of Admission: 6/20/23  Date of Intubation (most recent): 6/21/23  Reason for Mechanical Ventilation: Respiratory failure  Number of Days on Mechanical Ventilation: 6  Met Criteria for Pressure Support Trial: No  Reason for No Pressure Support Trial: On paralytic  Vent Mode: CMV/AC  (Continuous Mandatory Ventilation/ Assist Control)  FiO2 (%): 50 %  Resp Rate (Set): 20 breaths/min  Tidal Volume (Set, mL): 270 mL  PEEP (cm H2O): 8 cmH2O  Resp: 20      Dior Barba RT

## 2023-06-26 NOTE — PROGRESS NOTES
CLINICAL NUTRITION SERVICES - BRIEF NOTE      CURRENT NUTRITION ORDERS    Access: NG tube (6/21)    Formula/Rate/Provisions: Vital HP @ 15 ml/hr (360 ml) which will provide 360 kcal, 31 g protein, 40 g CHO, 0 g fiber, 301 ml free H2O    Flushes: H2O- 30 ml q 4 hrs    Modulars: Prosource TF 20 x 2 pkts/day= 160 kcal/40 g protein    Propofol at current rate provides 409 kcal/day    Total energy/protein provisions: 929 kcal/71 g protein--does not meet needs now that propofol has been decreased       NEW FINDINGS   Patient discussed during IDT rounds this morning. Propofol decreased.     Labs:   - Na 147  - BUN 49.9    Medications/infusions:    - Lipitor  - sliding scale insulin  - levothyroxine  - methylprednisolone  - mvit w/min  - pantoprazole      fentaNYL 200 mcg/hr (06/26/23 1000)     ketamine 53.6 mg/hr (06/26/23 1000)     lactated ringers 10 mL/hr at 06/26/23 0802     propofol 40 mcg/kg/min (06/26/23 1048)    And     - MEDICATION INSTRUCTIONS -       norepinephrine Stopped (06/24/23 0134)     - MEDICATION INSTRUCTIONS -       vecuronium (NORCURON) 1 mg/mL in D5W 50 mL 1.4 mcg/kg/min (06/26/23 1000)        ASSESSED NUTRITION NEEDS  Dose weight: 64.5 kg  Estimated Energy Needs: 0775-3959 kcals/day (20-25 kcal/kg)  Justification: Vented  Estimated Protein Needs:  grams protein/day (1.2 - 2 grams of pro/kg)  Justification: Hypercatabolism with critical illness  Estimated Fluid Needs: 1925 mL/day (30 mL/kg)   Justification: Maintenance    INTERVENTIONS  Implementation  Enteral Nutrition - Modify rate - Vital High Protein @ goal of  35ml/hr  (840ml/day)  will provide: 840 kcals, 73 g PRO, 702 ml free H20, 93 g CHO, and 0 g fiber daily.    Continue Prosource TF 20 x 2 pkts/day= 160 kcal/40 g protein    Propofol at current rate provides 409 kcal/day    Total energy/protein provisions, all sources= 1409 kcal (~22 kcal/kg)/113 g protein (~1.8 g/kg) per DW 64.5 kg    Monitoring/Evaluation  Will continue to monitor  and evaluate per protocol.    Danielle Molina RD, LD, [facility]  Pager - 3rd floor/ICU: [phone]  Pager - All other floors: [phone]  Pager - Weekend/holiday: [phone]  Office: [phone]

## 2023-06-26 NOTE — PLAN OF CARE
ICU End of Shift Summary.  For vital signs and complete assessments, please see documentation flowsheets.      Pertinent assessments: Afebrile. Remains medically sedated and paralyzed and on full vent support. Still not pulling in set TV of 270 more 250's max overnight. PIP in 40's but increases to 50's with and after nebs. LS coarse to diminished, less wheezing heard tonight. Tele SR/ST with multiple runs of SVT, self limited and very quick. Had been having 2-3 runs per 12 hour shift all weekend until early this morning had multiple short runs in the last hour, electrolytes WNL, tele hub notified. BP's elevated and Tachycardic at start of shift, per previous shift started getting this way with turns earlier in afternoon. Prn hydralazine given last shift without any effect. Sedation increased and helped slightly. Trialed dose of prn ativan to see if this would help, questioned daughter if patient is a drinker and she stated she used to be and to ask the  he would know more currently. BP's and HR did improve after ativan and increased sedation. BP's remained in 130's mostly the rest of night until 0615 this morning with last turn, shot up to 180's and became mildly tachycardic. Prn ativan given and bolus of propofol with improvement. Abdomen remains distended but now softer, had been very taut all weekend, active bowel sounds. Senakot and Miralax given this shift, last BM 6/20.  Hines remains in with good amounts of urine. Lasix given x 1 as ordered with good diuresis.   Major Shift Events:   - Ativan given x 2  - Sedation up and down  - Multiple runs of SVT, increased from previous shifts  --Not pulling in set TV  -PIP's 50's after nebs  - Bath given  Plan (Upcoming Events): Monitor K closely. Possibility of trailing of Vec today?  Discharge/Transfer Needs: TBD pending progress     Bedside Shift Report Completed : Y  Bedside Safety Check Completed: Y    Goal Outcome Evaluation:      Plan of Care Reviewed  With: family    Overall Patient Progress: no changeOverall Patient Progress: no change

## 2023-06-26 NOTE — CONSULTS
Patient is on CT schedule today Monday 6/26/23 for a left chest tube placement for a pneumothorax.      -Labs WNL for procedure.    -Orders for NPO have been entered.   -Consent will be done prior to procedure.     Please contact the CT department for procedural related questions.     Total time: 25 minutes    [facility] Interventional Radiology CNP ([phone]) (phone [phone])

## 2023-06-27 ENCOUNTER — APPOINTMENT (OUTPATIENT)
Dept: GENERAL RADIOLOGY | Facility: CLINIC | Age: 58
End: 2023-06-27
Attending: ANESTHESIOLOGY
Payer: COMMERCIAL

## 2023-06-27 LAB
ANION GAP SERPL CALCULATED.3IONS-SCNC: 3 MMOL/L (ref 7–15)
BUN SERPL-MCNC: 41 MG/DL (ref 8–23)
CALCIUM SERPL-MCNC: 9.1 MG/DL (ref 8.6–10)
CHLORIDE SERPL-SCNC: 110 MMOL/L (ref 98–107)
CREAT SERPL-MCNC: 0.62 MG/DL (ref 0.51–0.95)
DEPRECATED HCO3 PLAS-SCNC: 45 MMOL/L (ref 22–29)
ERYTHROCYTE [DISTWIDTH] IN BLOOD BY AUTOMATED COUNT: 13.8 % (ref 10–15)
GFR SERPL CREATININE-BSD FRML MDRD: >90 ML/MIN/1.73M2
GLUCOSE BLDC GLUCOMTR-MCNC: 140 MG/DL (ref 70–99)
GLUCOSE BLDC GLUCOMTR-MCNC: 155 MG/DL (ref 70–99)
GLUCOSE BLDC GLUCOMTR-MCNC: 159 MG/DL (ref 70–99)
GLUCOSE BLDC GLUCOMTR-MCNC: 161 MG/DL (ref 70–99)
GLUCOSE BLDC GLUCOMTR-MCNC: 177 MG/DL (ref 70–99)
GLUCOSE BLDC GLUCOMTR-MCNC: 214 MG/DL (ref 70–99)
GLUCOSE SERPL-MCNC: 183 MG/DL (ref 70–99)
HCT VFR BLD AUTO: 34.4 % (ref 35–47)
HGB BLD-MCNC: 10.3 G/DL (ref 11.7–15.7)
MAGNESIUM SERPL-MCNC: 2.6 MG/DL (ref 1.7–2.3)
MCH RBC QN AUTO: 33.7 PG (ref 26.5–33)
MCHC RBC AUTO-ENTMCNC: 29.9 G/DL (ref 31.5–36.5)
MCV RBC AUTO: 112 FL (ref 78–100)
PHOSPHATE SERPL-MCNC: 3 MG/DL (ref 2.5–4.5)
PLATELET # BLD AUTO: 153 10E3/UL (ref 150–450)
POTASSIUM SERPL-SCNC: 4.1 MMOL/L (ref 3.4–5.3)
POTASSIUM SERPL-SCNC: 4.4 MMOL/L (ref 3.4–5.3)
RBC # BLD AUTO: 3.06 10E6/UL (ref 3.8–5.2)
SODIUM SERPL-SCNC: 158 MMOL/L (ref 136–145)
SODIUM SERPL-SCNC: 160 MMOL/L (ref 136–145)
WBC # BLD AUTO: 10.3 10E3/UL (ref 4–11)

## 2023-06-27 PROCEDURE — 250N000011 HC RX IP 250 OP 636: Performed by: SURGERY

## 2023-06-27 PROCEDURE — 85027 COMPLETE CBC AUTOMATED: CPT | Performed by: INTERNAL MEDICINE

## 2023-06-27 PROCEDURE — 84100 ASSAY OF PHOSPHORUS: CPT | Performed by: HOSPITALIST

## 2023-06-27 PROCEDURE — 250N000009 HC RX 250: Performed by: INTERNAL MEDICINE

## 2023-06-27 PROCEDURE — 999N000157 HC STATISTIC RCP TIME EA 10 MIN

## 2023-06-27 PROCEDURE — 99291 CRITICAL CARE FIRST HOUR: CPT | Performed by: ANESTHESIOLOGY

## 2023-06-27 PROCEDURE — 250N000011 HC RX IP 250 OP 636: Performed by: INTERNAL MEDICINE

## 2023-06-27 PROCEDURE — 82435 ASSAY OF BLOOD CHLORIDE: CPT | Performed by: INTERNAL MEDICINE

## 2023-06-27 PROCEDURE — 258N000003 HC RX IP 258 OP 636: Performed by: SURGERY

## 2023-06-27 PROCEDURE — 250N000013 HC RX MED GY IP 250 OP 250 PS 637: Performed by: INTERNAL MEDICINE

## 2023-06-27 PROCEDURE — 258N000003 HC RX IP 258 OP 636: Performed by: INTERNAL MEDICINE

## 2023-06-27 PROCEDURE — 99233 SBSQ HOSP IP/OBS HIGH 50: CPT | Performed by: INTERNAL MEDICINE

## 2023-06-27 PROCEDURE — 999N000065 XR CHEST PORT 1 VIEW

## 2023-06-27 PROCEDURE — 83735 ASSAY OF MAGNESIUM: CPT | Performed by: HOSPITALIST

## 2023-06-27 PROCEDURE — 250N000011 HC RX IP 250 OP 636: Performed by: HOSPITALIST

## 2023-06-27 PROCEDURE — 250N000013 HC RX MED GY IP 250 OP 250 PS 637: Performed by: HOSPITALIST

## 2023-06-27 PROCEDURE — 94640 AIRWAY INHALATION TREATMENT: CPT | Mod: 76

## 2023-06-27 PROCEDURE — 250N000009 HC RX 250: Performed by: SURGERY

## 2023-06-27 PROCEDURE — 250N000011 HC RX IP 250 OP 636: Performed by: ANESTHESIOLOGY

## 2023-06-27 PROCEDURE — 94640 AIRWAY INHALATION TREATMENT: CPT

## 2023-06-27 PROCEDURE — 84295 ASSAY OF SERUM SODIUM: CPT | Performed by: ANESTHESIOLOGY

## 2023-06-27 PROCEDURE — 94003 VENT MGMT INPAT SUBQ DAY: CPT

## 2023-06-27 PROCEDURE — 200N000001 HC R&B ICU

## 2023-06-27 PROCEDURE — 250N000013 HC RX MED GY IP 250 OP 250 PS 637: Performed by: SURGERY

## 2023-06-27 PROCEDURE — 84132 ASSAY OF SERUM POTASSIUM: CPT | Performed by: HOSPITALIST

## 2023-06-27 RX ADMIN — Medication 40 MG: at 08:03

## 2023-06-27 RX ADMIN — CLINDAMYCIN PHOSPHATE 900 MG: 900 INJECTION, SOLUTION INTRAVENOUS at 05:10

## 2023-06-27 RX ADMIN — HYDRALAZINE HYDROCHLORIDE 25 MG: 25 TABLET, FILM COATED ORAL at 14:04

## 2023-06-27 RX ADMIN — APIXABAN 5 MG: 5 TABLET, FILM COATED ORAL at 08:01

## 2023-06-27 RX ADMIN — MINERAL OIL AND WHITE PETROLATUM: 150; 830 OINTMENT OPHTHALMIC at 07:58

## 2023-06-27 RX ADMIN — VECURONIUM BROMIDE 1.4 MCG/KG/MIN: 1 INJECTION, POWDER, LYOPHILIZED, FOR SOLUTION INTRAVENOUS at 00:23

## 2023-06-27 RX ADMIN — IPRATROPIUM BROMIDE 0.5 MG: 0.5 SOLUTION RESPIRATORY (INHALATION) at 20:26

## 2023-06-27 RX ADMIN — INSULIN ASPART 1 UNITS: 100 INJECTION, SOLUTION INTRAVENOUS; SUBCUTANEOUS at 19:53

## 2023-06-27 RX ADMIN — DILTIAZEM HYDROCHLORIDE 60 MG: 30 TABLET, FILM COATED ORAL at 17:45

## 2023-06-27 RX ADMIN — METHYLPREDNISOLONE SODIUM SUCCINATE 62.5 MG: 125 INJECTION INTRAMUSCULAR; INTRAVENOUS at 08:08

## 2023-06-27 RX ADMIN — HYDRALAZINE HYDROCHLORIDE 10 MG: 20 INJECTION INTRAMUSCULAR; INTRAVENOUS at 09:59

## 2023-06-27 RX ADMIN — APIXABAN 5 MG: 5 TABLET, FILM COATED ORAL at 21:04

## 2023-06-27 RX ADMIN — POLYETHYLENE GLYCOL 3350 17 G: 17 POWDER, FOR SOLUTION ORAL at 04:05

## 2023-06-27 RX ADMIN — PAROXETINE 20 MG: 10 SUSPENSION ORAL at 08:03

## 2023-06-27 RX ADMIN — DILTIAZEM HYDROCHLORIDE 60 MG: 30 TABLET, FILM COATED ORAL at 23:48

## 2023-06-27 RX ADMIN — ACETAMINOPHEN 650 MG: 325 SOLUTION ORAL at 07:56

## 2023-06-27 RX ADMIN — HYDRALAZINE HYDROCHLORIDE 25 MG: 25 TABLET, FILM COATED ORAL at 21:04

## 2023-06-27 RX ADMIN — DEXTROSE AND SODIUM CHLORIDE: 5; 450 INJECTION, SOLUTION INTRAVENOUS at 23:52

## 2023-06-27 RX ADMIN — CLINDAMYCIN PHOSPHATE 900 MG: 900 INJECTION, SOLUTION INTRAVENOUS at 21:05

## 2023-06-27 RX ADMIN — INSULIN ASPART 1 UNITS: 100 INJECTION, SOLUTION INTRAVENOUS; SUBCUTANEOUS at 00:11

## 2023-06-27 RX ADMIN — PROPOFOL 45 MCG/KG/MIN: 10 INJECTION, EMULSION INTRAVENOUS at 21:06

## 2023-06-27 RX ADMIN — LEVALBUTEROL HYDROCHLORIDE 0.63 MG: 0.63 SOLUTION RESPIRATORY (INHALATION) at 20:26

## 2023-06-27 RX ADMIN — LEVALBUTEROL HYDROCHLORIDE 0.63 MG: 0.63 SOLUTION RESPIRATORY (INHALATION) at 07:25

## 2023-06-27 RX ADMIN — METHYLPREDNISOLONE SODIUM SUCCINATE 62.5 MG: 125 INJECTION INTRAMUSCULAR; INTRAVENOUS at 19:38

## 2023-06-27 RX ADMIN — CLINDAMYCIN PHOSPHATE 900 MG: 900 INJECTION, SOLUTION INTRAVENOUS at 13:10

## 2023-06-27 RX ADMIN — IPRATROPIUM BROMIDE 0.5 MG: 0.5 SOLUTION RESPIRATORY (INHALATION) at 15:04

## 2023-06-27 RX ADMIN — IPRATROPIUM BROMIDE 0.5 MG: 0.5 SOLUTION RESPIRATORY (INHALATION) at 07:26

## 2023-06-27 RX ADMIN — KETAMINE HYDROCHLORIDE 53.6 MG/HR: 100 INJECTION, SOLUTION INTRAMUSCULAR; INTRAVENOUS at 00:21

## 2023-06-27 RX ADMIN — ATORVASTATIN CALCIUM 20 MG: 20 TABLET, FILM COATED ORAL at 08:01

## 2023-06-27 RX ADMIN — IPRATROPIUM BROMIDE 0.5 MG: 0.5 SOLUTION RESPIRATORY (INHALATION) at 12:23

## 2023-06-27 RX ADMIN — ACETAMINOPHEN 650 MG: 325 SOLUTION ORAL at 04:06

## 2023-06-27 RX ADMIN — NICOTINE 1 PATCH: 14 PATCH, EXTENDED RELEASE TRANSDERMAL at 08:09

## 2023-06-27 RX ADMIN — Medication 200 MCG/HR: at 19:24

## 2023-06-27 RX ADMIN — INSULIN ASPART 2 UNITS: 100 INJECTION, SOLUTION INTRAVENOUS; SUBCUTANEOUS at 12:12

## 2023-06-27 RX ADMIN — INSULIN ASPART 1 UNITS: 100 INJECTION, SOLUTION INTRAVENOUS; SUBCUTANEOUS at 07:53

## 2023-06-27 RX ADMIN — INSULIN ASPART 1 UNITS: 100 INJECTION, SOLUTION INTRAVENOUS; SUBCUTANEOUS at 03:53

## 2023-06-27 RX ADMIN — DILTIAZEM HYDROCHLORIDE 60 MG: 30 TABLET, FILM COATED ORAL at 06:11

## 2023-06-27 RX ADMIN — INSULIN ASPART 1 UNITS: 100 INJECTION, SOLUTION INTRAVENOUS; SUBCUTANEOUS at 15:58

## 2023-06-27 RX ADMIN — LEVOTHYROXINE SODIUM 112 MCG: 0.11 TABLET ORAL at 08:00

## 2023-06-27 RX ADMIN — DILTIAZEM HYDROCHLORIDE 60 MG: 30 TABLET, FILM COATED ORAL at 12:03

## 2023-06-27 RX ADMIN — HYDRALAZINE HYDROCHLORIDE 25 MG: 25 TABLET, FILM COATED ORAL at 08:01

## 2023-06-27 RX ADMIN — PROPOFOL 40 MCG/KG/MIN: 10 INJECTION, EMULSION INTRAVENOUS at 04:12

## 2023-06-27 RX ADMIN — ACETAMINOPHEN 650 MG: 325 SOLUTION ORAL at 12:04

## 2023-06-27 RX ADMIN — Medication 200 MCG/HR: at 06:57

## 2023-06-27 RX ADMIN — MINERAL OIL AND WHITE PETROLATUM: 150; 830 OINTMENT OPHTHALMIC at 00:04

## 2023-06-27 RX ADMIN — Medication 15 ML: at 08:00

## 2023-06-27 RX ADMIN — PROPOFOL 45 MCG/KG/MIN: 10 INJECTION, EMULSION INTRAVENOUS at 15:11

## 2023-06-27 RX ADMIN — ACETAMINOPHEN 650 MG: 325 SOLUTION ORAL at 19:43

## 2023-06-27 RX ADMIN — LEVALBUTEROL HYDROCHLORIDE 0.63 MG: 0.63 SOLUTION RESPIRATORY (INHALATION) at 15:04

## 2023-06-27 RX ADMIN — SENNOSIDES 10 ML: 8.8 LIQUID ORAL at 04:06

## 2023-06-27 RX ADMIN — LEVALBUTEROL HYDROCHLORIDE 0.63 MG: 0.63 SOLUTION RESPIRATORY (INHALATION) at 12:23

## 2023-06-27 RX ADMIN — PROPOFOL 45 MCG/KG/MIN: 10 INJECTION, EMULSION INTRAVENOUS at 10:33

## 2023-06-27 RX ADMIN — ACETAMINOPHEN 650 MG: 325 SOLUTION ORAL at 15:55

## 2023-06-27 ASSESSMENT — ACTIVITIES OF DAILY LIVING (ADL)
ADLS_ACUITY_SCORE: 36

## 2023-06-27 NOTE — PROGRESS NOTES
Spoke with MD's in rounds about hypertension and tachycardia-md feel driven by ketamine. Also asked about edema.

## 2023-06-27 NOTE — PROGRESS NOTES
[facility] ICU VENTILATOR RESPIRATORY NOTE  Date of Admission: 6/20/23  Date of Intubation (most recent): 6/21/23  Reason for Mechanical Ventilation: Respiratory failure  Number of Days on Mechanical Ventilation: 7  Met Criteria for Pressure Support Trial: No  Reason for No Pressure Support Trial: On paralytic  Vent Mode: CMV/AC  (Continuous Mandatory Ventilation/ Assist Control)  FiO2 (%): 60 %  Resp Rate (Set): 20 breaths/min  Tidal Volume (Set, mL): 270 mL  PEEP (cm H2O): 8 cmH2O  Resp: 20    Dior Barba RT

## 2023-06-27 NOTE — PROGRESS NOTES
"[facility]  Respiratory Care Note    Replaced by [facility] ICU VENTILATOR RESPIRATORY NOTE  Date of Admission: 06/20/2023  Date of Intubation (most recent): 06/21/2023  Reason for Mechanical Ventilation: Respiratory Failure  Number of Days on Mechanical Ventilation: 7  Met Criteria for Pressure Support Trial: No  Length of Pressure Support Trial:   Reason for Stopping Pressure Support Trial:   Reason for No Pressure Support Trial: Being weaned off her Paralytic   Significant Events Today:   ABG Results: 7.36/76/92/43 @0351 06/26/2023  ETT appearance on chest x-ray: 4 cm above Fall[place]     Plan: Will continue to monitor and assess the pt's current respiratory status and needs, in hopes of liberating her from the ventilator.    Vent Mode: CMV/AC  (Continuous Mandatory Ventilation/ Assist Control)  FiO2 (%): 65 %  Resp Rate (Set): 20 breaths/min  Tidal Volume (Set, mL): 270 mL  PEEP (cm H2O): 8 cmH2O  Resp: 20    Vital signs:  Temp: 100.1  F (37.8  C) Temp src: Axillary BP: 112/51 Pulse: (!) 129   Resp: 20 SpO2: 91 % O2 Device: Mechanical Ventilator   Height: 160 cm (5' 3\") Weight: 75.3 kg (166 lb 0.1 oz)  Estimated body mass index is 29.41 kg/m  as calculated from the following:    Height as of this encounter: 1.6 m (5' 3\").    Weight as of this encounter: 75.3 kg (166 lb 0.1 oz).      Recent Labs   Lab 06/26/23  0351 06/25/23  0357 06/24/23  1809 06/24/23  0406   PH 7.36 7.30* 7.29* 7.27*   PCO2 76* 74* 71* 71*   PO2 92 100 74* 98   HCO3 43* 36* 34* 33*   O2PER 50 50 40 40     Past Medical History:   Diagnosis Date     Anxiety      COPD (chronic obstructive pulmonary disease) - 2L home O2      Diverticulitis of colon      Hypercholesterolemia      Hypertension      Hypothyroidism      Paroxysmal atrial fibrillation      Psoriasis      Pulmonary nodule - left upper lobe        Past Surgical History:   Procedure Laterality Date     CHOLECYSTECTOMY       INCISION AND DRAINAGE MANDIBLE, COMBINED Left " 01/10/2022    Procedure: INCISION AND DRAINAGE, MANDIBLE;  Surgeon: Jn Feliz DDS;  Location: [facility] OR     INCISION AND DRAINAGE MANDIBLE, COMBINED Left 02/04/2022    Procedure: INCISION AND DRAINAGE, MANDIBLE;  Surgeon: Taylor Ortez DDS;  Location: [facility] OR     TOOTH EXTRACTION       Vocal Cord surgery         Family History   Problem Relation Age of Onset     Deep Vein Thrombosis (DVT) Mother      Hypertension Mother        Social History     Tobacco Use     Smoking status: Never     Smokeless tobacco: Never   Substance Use Topics     Alcohol use: Yes     Comment: stevan FREDERICK  [facility]  6/27/2023

## 2023-06-27 NOTE — PROGRESS NOTES
[facility]  Hospitalist Progress Note  Albin Navarro MD 06/27/23    Reason for Stay (Diagnosis): Respiratory failure, COPD exacerbation, pneumothorax         Assessment and Plan:      Summary of Stay:   Lara Melendez is a 57 year old female with PMH including COPD, emphysema, as needed 2-3 L O2 at home, tobacco dependence, hypertension, anxiety, paroxysmal atrial fibrillation on Eliquis, hypothyroidism, psoriasis, and hyperlipidemia who presented on 6/21 with worsening shortness of breath and wheezing.  Symptom onset was a few days prior to presentation and consisting of wheezing so her pulmonologist prescribed azithromycin and prednisone.  She took about 3 days of these medications.  Due to continued decline she called EMS and was brought to the ED for evaluation.     In the emergency department she had a clear COPD exacerbation so was given IV steroids, IV magnesium, multiple nebulizers and placed on BiPAP.  Imaging showed no clear evidence of pneumonia but she remained on azithromycin.  She was admitted to the ICU.     The day following admission her respiratory condition deteriorated to the point that she required intubation and mechanical ventilation.  She had significant bronchospasm and high airway pressures so required deep sedation with fentanyl, ketamine and propofol with addition of vecuronium.  She was given a continuous albuterol nebulizer with not much improvement.  Pulmonology was consulted as well.  The case was even discussed with [facility] and she is not an ECMO candidate.  Repeat x-ray 6/26 showed a left-sided pneumothorax and IR placed a chest tube, thoracic surgery was consulted.  Worsening hypernatremia with tube feeds, free water increased.  She developed a fever to 102  F and she was started on IV clindamycin 6/26.      Problem List/Assessment and Plan:   Acute on chronic  hypoxemic respiratory failure  COPD with acute exacerbation  Left-sided pneumothorax  Possible pneumonia:  At baseline she is on chronic oxygen of 2 to 3 L by nasal cannula as needed.  She has had several admissions for severe COPD requiring BiPAP, although has not previously been intubated.  Initially during this hospitalization she was on BiPAP but she quickly deteriorated so was intubated 6/21.  She developed high airway pressures and bronchospasm so required deep sedation with fentanyl, ketamine, and propofol.  Vecuronium was added for increased chest compliance.  Continuous albuterol nebulizer for 24 hours provided no benefit and even seem to increase bronchospasm and has been discontinued.    Chest x-ray 6/26 showed a left-sided pneumothorax likely barotrauma in setting of emphysema and high airway pressures due to severe COPD exacerbation.  Chest tube was placed by IR.  -Intensivist to manage ventilator  -Pulmonology consulted  -IV methylprednisolone 62.5 mg twice daily   -Scheduled Xopenex and Atrovent nebs   -Finished a 5-day course of azithromycin   -Currently on IV fentanyl, ketamine, and propofol infusions.  Weaning ketamine as able in case this is contributing to tachycardia and hypertension   -Wean vecuronium today as able  -IV clindamycin started 6/26 due to fever above 102  F.  She does have a confirmed penicillin rash, but if persistent fevers we may need to trial cephalosporins versus carbapenem  -Repeat blood and sputum cultures 6/26  -Thoracic surgery consulted for chest tube management   -Dr. Ovalles discussed the case with [facility] on she is not an ECMO candidate due to her chronic respiratory failure.    Paroxysmal atrial fibrillation: More tachycardic today which may be secondary to the fevers or alternatively ketamine.  PTA on diltiazem and Eliquis.  -Eliquis resumed  -Oral diltiazem 60 mg every 6 hours  -Avoiding beta-blockers due to severe COPD    Hyperkalemia: Potassium bola to 5.5 and persisted in that range for a day or 2.  She received several doses of Lokelma and potassium level has now  "normalized.  Unclear etiology.  Renal function is normal.  Her PTA losartan has been on hold.    Hypernatremia: Significant rise in sodium now up to 158 with tube feed initiation.  -Free water increased to 180 mL every 4 hours  -Follow sodium levels every 6 hours, may need to bolus D5 or start continuous free water replacement via NG    Steroid-induced hyperglycemia: Continue medium sliding scale aspart.  Reasonable glycemic control at this time with most blood sugars in the 150 range.    Lactic acidosis: Likely due to nebulizers and hypoxia.     Anxiety: Holding prior to admission clonazepam.  Continue paroxetine and as needed IV lorazepam.    HTN: Initially had some soft blood pressure but the past 48 hours has been quite hypertensive.    This may be secondary to ketamine.    -Wean ketamine as able   -Continue PTA diltiazem continue PTA diltiazem.    -Hesitant to resume losartan with recent issues involving hyperkalemia and hesitant to start beta-blockers with her severe reactive airway disease.  For now we will schedule hydralazine 25 mg 3 times daily.    -As needed IV hydralazine available as needed.    Tobacco dependence: Nicotine replacement.    Chronic anemia: Hemoglobin at baseline of 10-11.    DVT Prophylaxis: Eliquis  Code Status: Full Code  Lines: PICC line, arterial line, PIV, Hines catheter, NG, left-sided chest tube  FEN: Tube feeds, dietitian consulted, increasing free water  Discharge Dispo: TBD  Estimated Disch Date / # of Days until Disch: Continue ICU level cares, anticipate ongoing prolonged hospitalization    I updated spouse Luis by phone today    Clinically Significant Risk Factors         # Hypernatremia: Highest Na = 158 mmol/L in last 2 days, will monitor as appropriate          # Hypertension: Noted on problem list        # Overweight: Estimated body mass index is 29.41 kg/m  as calculated from the following:    Height as of this encounter: 1.6 m (5' 3\").    Weight as of this encounter: " "75.3 kg (166 lb 0.1 oz).                       Interval History (Subjective):      Assumed care today.  Chest tube placed yesterday for left-sided pneumothorax.  More tachycardic and hypertensive.  Attempting to wean paralytic and ketamine.  3.5 L urine output.  Tmax 102  F and started on IV clindamycin last night.                  Physical Exam:      Last Vital Signs:  /51   Pulse (!) 134   Temp (!) 101  F (38.3  C) (Axillary)   Resp 20   Ht 1.6 m (5' 3\")   Wt 75.3 kg (166 lb 0.1 oz)   SpO2 95%   BMI 29.41 kg/m        Intake/Output Summary (Last 24 hours) at 6/27/2023 1642  Last data filed at 6/27/2023 1600  Gross per 24 hour   Intake 2491.35 ml   Output 3425 ml   Net -933.65 ml       Constitutional: Intubated and sedated  Eyes: sclera white   HEENT: ET tube  Respiratory: No focal crackles or wheezing.  Left-sided chest tube in place  Cardiovascular: Regular tachycardia without murmur  GI: non-tender, not distended, bowel sounds present  Genitourinary: Hines catheter with yellow urine output  Skin: no rash    Musculoskeletal/extremities: 1+ bilateral lower extremity edema  Neurologic: Sedated  Psychiatric: calm          Medications:      All current medications were reviewed with changes reflected in problem list.         Data:      All new lab and imaging data were personally reviewed.   Labs:  Recent Labs   Lab 06/27/23  1551 06/27/23  1214 06/27/23  1212 06/27/23  0753 06/27/23  0352 06/27/23  0348 06/27/23  0010 06/26/23  2100 06/26/23  0807 06/26/23  0352 06/25/23  0358 06/25/23  0357   NA  --  158*  --   --   --  158*  --   --   --  147*  --  139   POTASSIUM  --  4.4  --   --   --  4.1  4.1  --  4.2   < > 4.3  4.3   < > 5.6*   CHLORIDE  --   --   --   --   --  110*  --   --   --  101  --  101   CO2  --   --   --   --   --  45*  --   --   --  38*  --  32*   ANIONGAP  --   --   --   --   --  3*  --   --   --  8  --  6*   *  --  214* 140*   < > 183*   < >  --    < > 170*   < > 158*   BUN  " --   --   --   --   --  41.0*  --   --   --  49.9*  --  48.2*   CR  --   --   --   --   --  0.62  --   --   --  0.67  --  0.87   GFRESTIMATED  --   --   --   --   --  >90  --   --   --  >90  --  76   EDIN  --   --   --   --   --  9.1  --   --   --  8.8  --  8.9    < > = values in this interval not displayed.     Recent Labs   Lab 06/27/23  0348 06/26/23  0352 06/25/23  0357   WBC 10.3 8.7 10.7   HGB 10.3* 9.8* 10.2*   HCT 34.4* 31.9* 32.2*   * 108* 105*    160 168      Imaging:   Recent Results (from the past 24 hour(s))   XR Chest Port 1 View    Narrative    CHEST PORTABLE 1 VIEW   6/27/2023 10:18 AM     HISTORY: Status post chest tube placement for pneumothorax.    COMPARISON: Chest x-ray on 6/26/2023.      Impression    IMPRESSION: Single AP view of the chest was obtained. Endotracheal  tube tip projects over the midthoracic trachea approximately 4 cm from  the stevie. Enteric tube crosses the diaphragm with distal tip outside  the field of view. Right upper extremity PICC tip projects over the  high right atrium. Cardiomediastinal silhouette is within normal  limits. Significant interval decrease/almost complete resolution of  the previously seen left pneumothorax status post chest tube  placement. Multiple small radiodensity projects over the left lung  apex, likely represent areas of calcification seen on prior chest CTs.  Otherwise, no suspicious focal pulmonary opacities. No significant  pleural effusion or pneumothorax.    TENZIN SHEPHERD MD         SYSTEM ID:  [id number]         Albin Navarro MD

## 2023-06-27 NOTE — PLAN OF CARE
Patient remains intubated and sedated. Tolerating tube feeds. Good urine output.   Paralytic off. Monitoring sodium levels.free water increased.   Continue to monitor  Discharge plan to be determined.

## 2023-06-27 NOTE — PROGRESS NOTES
Critical Care  Note      06/27/2023    Name: Lara Melendez MRN#: [id number]   Age: 57 year old YOB: 1965     Hsptl Day# 6  ICU DAY # 6    MV DAY # 6             Problem List:   Principal Problem:    Paroxysmal atrial fibrillation with RVR (H)  Active Problems:    COPD exacerbation (H)    Anticoagulated    Acute and chronic respiratory failure with hypercapnia (H)           Summary/Hospital Course:   57-year-old female with history of COPD, emphysema, O2 dependent at home, tobacco dependence, hypertension, anxiety, atrial fibrillation on Eliquis, hypothyroidism presents with worsening shortness of breath and wheezing.  She appears to be in status asthmaticus versus worsening COPD exacerbation.  Patient was prescribed azithromycin and prednisone prior to admission.  She continued decline was brought to EMS for evaluation.  Where she was given IV steroids, IV magnesium nebulizers and BiPAP.  After admission to the ICU she failed a BiPAP trial and required intubation mechanical ventilation.  She had significant bronchospasm with high airway pressures requiring deep sedation with fentanyl ketamine and propofol and addition of a paralytic.  Events of last 24 hours noted for patient developing small left-sided pneumothorax which was treated with chest tube under IR guidance.  Patient's oxygenation remained stable.  Peak airway pressures remained invaded with adequate mean airway pressures.  We will attempt to wean patient from paralytic today.          Assessment and plan :     Lara Melendez IS a 57 year old female admitted on 6/20/2023 for, acute respiratory failure secondary to COPD exacerbation, small left-sided pneumothorax and history of atrial fibrillation on Eliquis..   I have personally reviewed the daily labs, imaging studies, cultures and discussed the case with referring physician and consulting physicians.     My assessment and plan by system for this patient is as  follows:    Neurology/Psychiatry:   1.  Continues require deep sedation given her dyssynchrony with the vent  2.  Continue ketamine for possible bronchodilatation  3.  Paralytic for vent synchrony, will attempt paralytic holiday    Plan  We will continue the patient on the current regimen at this time. BIS  monitoring is less than 40 so appropriate level of sedation while on paralytic.  Continue current regimen for deep sedation.  Once patient is off paralytic we would consider lightening sedation if patient's respiratory status could tolerate it    Cardiovascular:   1.Hemodynamics -normotensive to hypertensive  2.Rhythm -sinus rhythm but patient does have history of atrial fibrillation on presentation  3. Ischemia -no signs of ischemia  Plan  Patient does have a history of atrial fibrillation, appears to be in sinus tach at this time.  May be related to ketamine usage.  Continue to monitor heart rate, continue Eliquis for patient's history of atrial fibrillation.  We will continue to treat patient's hypertension with hydralazine as needed.      Pulmonary/Ventilator Management:   1. Airway intubated for acute ventilatory failure  2. Oxygenation/ventilation/mechanics on full mechanical ventilatory support with 50% FiO2 and 8 of PEEP.  Plan  -Continue patient on current regimen, continue patient on full mechanical ventilatory support.  Status post chest tube placement, I reviewed chest x-ray from this morning which shows chest tube in good position with the resolution of pneumothorax.   Airway pressures are slightly better today.  We will decrease methylprednisolone by half today.  Continue to monitor peak and plateau airway pressures best as possible given patient's bronchospasm.      GI and Nutrition :   1.  Concern for protein calorie malnutrition    Plan  -Resume enteral feeds.    Renal/Fluids/Electrolytes:   1.  No acute renal injury at this time  2.  Mild hypernatremia  3.  Compensated respiratory acidosis  4.   Appears hypervolemic  Plan  -We will continue to monitor, hyperkalemia seems to be resolving  - monitor function and electrolytes as needed with replacement per ICU protocols. - generally avoid nephrotoxic agents such as NSAID, IV contrast unless specifically required  - adjust medications as needed for renal clearance  - follow I/O's as appropriate.  -Continue to observe hypernatremia may need to increase free water   -Increase sodium checks to every 4 hours  Hold diuresis given escalating hyponatremia    Infectious Disease:   1.  No active issues    Plan  -Continue to monitor, patient was febrile overnight, appreciate night float input with regards to starting patient on broad-spectrum antibiotics and obtaining pancultures.  We will continue the patient on broad-spectrum antibiotics while awaiting culture results.    Endocrine:   1.  Concern for stress-induced hyperglycemia  2.  Concern for diabetes induced hyperglycemia  3.  Plan  - ICU insulin protocol, goal sugar <180      Hematology/Oncology:   1.  No leukocytosis  2.  Mild anemia most likely secondary to hemodilution  3.  On Eliquis for atrial fibrillation  Plan  -Continue to monitor     IV/Access:   1. Venous access -central access  2. Arterial access -arterial line  3.  Plan  - central access required and necessary      ICU Prophylaxis:   1. DVT: On Eliquis  2. VAP: HOB 30 degrees, chlorhexidine rinse  3. Stress Ulcer: PPI/H2 blocker  4. Restraints: Nonviolent soft two point restraints required and necessary for patient safety and continued cares and good effect as patient continues to pull at necessary lines, tubes despite education and distraction. Will readdress daily.   5. Wound care  -local wound care  6. Feeding -continue tube feeds  7. Family Update: I did not update family today  8. Disposition -ICU        Key goals for next 24 hours:   1.  Continue chest tube to suction  2.  Paralytic holiday  3.  Increase free water given hypernatremia                Medical History:     Past Medical History:   Diagnosis Date     Anxiety      COPD (chronic obstructive pulmonary disease) - 2L home O2      Diverticulitis of colon      Hypercholesterolemia      Hypertension      Hypothyroidism      Paroxysmal atrial fibrillation      Psoriasis      Pulmonary nodule - left upper lobe      Past Surgical History:   Procedure Laterality Date     CHOLECYSTECTOMY       INCISION AND DRAINAGE MANDIBLE, COMBINED Left 01/10/2022    Procedure: INCISION AND DRAINAGE, MANDIBLE;  Surgeon: Jn Feliz DDS;  Location: [facility] OR     INCISION AND DRAINAGE MANDIBLE, COMBINED Left 02/04/2022    Procedure: INCISION AND DRAINAGE, MANDIBLE;  Surgeon: Taylor Ortez DDS;  Location: [facility] OR     TOOTH EXTRACTION       Vocal Cord surgery       Social History     Socioeconomic History     Marital status:      Spouse name: Not on file     Number of children: Not on file     Years of education: Not on file     Highest education level: Not on file   Occupational History     Not on file   Tobacco Use     Smoking status: Never     Smokeless tobacco: Never   Substance and Sexual Activity     Alcohol use: Yes     Comment: occ     Drug use: Never     Sexual activity: Not Currently   Other Topics Concern     Not on file   Social History Narrative     Not on file     Social Determinants of Health     Financial Resource Strain: Not on file   Food Insecurity: Not on file   Transportation Needs: Not on file   Physical Activity: Not on file   Stress: Not on file   Social Connections: Not on file   Intimate Partner Violence: Not on file   Housing Stability: Not on file        Allergies   Allergen Reactions     Sulfa Antibiotics      Doxycycline Other (See Comments)     Develops chest tightness  Intolerance not allergy     Penicillins Rash     Generalized rash  Rash, Generalized                Key Medications:       apixaban ANTICOAGULANT  5 mg Per Feeding Tube BID     atorvastatin  20 mg Per Feeding Tube Daily      clindamycin  900 mg Intravenous Q8H     diltiazem  60 mg Oral or Feeding Tube Q6H ELZBIETA     hydrALAZINE  25 mg Oral Q8H ELZBIETA     insulin aspart  1-6 Units Subcutaneous Q4H     ipratropium  0.5 mg Nebulization 4x daily     levalbuterol  0.63 mg Nebulization 4x Daily     levothyroxine  112 mcg Per Feeding Tube Daily     methylPREDNISolone  62.5 mg Intravenous BID     multivitamins w/minerals  15 mL Per Feeding Tube Daily     nicotine  1 patch Transdermal Daily     nicotine   Transdermal Q8H     pantoprazole  40 mg Per Feeding Tube Daily     PARoxetine  20 mg Per Feeding Tube Daily     protein modular  1 packet Per Feeding Tube BID     sodium chloride (PF)  10-40 mL Intracatheter Q8H     sodium chloride (PF)  3 mL Intracatheter Q8H       fentaNYL 200 mcg/hr (06/27/23 0800)     ketamine 53.6 mg/hr (06/27/23 0800)     lactated ringers 10 mL/hr at 06/27/23 0600     propofol 45 mcg/kg/min (06/27/23 1033)    And     - MEDICATION INSTRUCTIONS -       norepinephrine Stopped (06/24/23 0134)     - MEDICATION INSTRUCTIONS -          Home Meds  No current facility-administered medications on file prior to encounter.  albuterol (PROAIR HFA/PROVENTIL HFA/VENTOLIN HFA) 108 (90 Base) MCG/ACT inhaler, Inhale 2 puffs into the lungs every 4 hours as needed for shortness of breath / dyspnea or wheezing Inhale 1-2 Puffs every 4 hours as needed for Wheezing.  albuterol (PROVENTIL) (2.5 MG/3ML) 0.083% neb solution, Take 1 vial (2.5 mg) by nebulization every 4 hours as needed for shortness of breath or wheezing  apixaban ANTICOAGULANT (ELIQUIS) 5 MG tablet, Take 1 tablet (5 mg) by mouth 2 times daily  atorvastatin (LIPITOR) 20 MG tablet, Take 20 mg by mouth daily  clonazePAM (KLONOPIN) 0.5 MG tablet, Take 0.5 mg by mouth daily  cyclobenzaprine (FLEXERIL) 5 MG tablet, Take 1 tablet (5 mg) by mouth every 8 hours as needed for muscle spasms  diclofenac (VOLTAREN) 1 % topical gel, Apply 2 g topically 4 times daily  diltiazem ER COATED BEADS  (CARDIZEM CD/CARTIA XT) 240 MG 24 hr capsule, Take 1 capsule (240 mg) by mouth daily  fluticasone-salmeterol (ADVAIR-HFA) 230-21 MCG/ACT inhaler, Inhale 2 puffs into the lungs 2 times daily  ipratropium - albuterol 0.5 mg/2.5 mg/3 mL (DUONEB) 0.5-2.5 (3) MG/3ML neb solution, Take 1 vial (3 mLs) by nebulization every 6 hours as needed for shortness of breath / dyspnea or wheezing  levothyroxine (SYNTHROID/LEVOTHROID) 112 MCG tablet, Take 112 mcg by mouth daily  losartan (COZAAR) 25 MG tablet, Take 1 tablet (25 mg) by mouth daily  nicotine (NICODERM CQ) 21 MG/24HR 24 hr patch, Place 1 patch onto the skin daily  PARoxetine (PAXIL) 20 MG tablet, Take 20 mg by mouth daily  predniSONE (DELTASONE) 20 MG tablet, Take 60 mg for 2 days, then 40 mg for 3 days, then 20 mg for 3 days, then 10 mg for 3 days, then stop  tiotropium (SPIRIVA RESPIMAT) 2.5 MCG/ACT inhaler, Inhale 2 puffs into the lungs daily as needed               Physical Examination:   Temp:  [97.8  F (36.6  C)-102.9  F (39.4  C)] 97.8  F (36.6  C)  Pulse:  [] 111  Resp:  [20] 20  BP: (112-174)/(51-71) 112/51  MAP:  [72 mmHg-120 mmHg] 120 mmHg  Arterial Line BP: (111-183)/(52-83) 183/83  FiO2 (%):  [55 %-65 %] 55 %  SpO2:  [90 %-100 %] 93 %    Intake/Output Summary (Last 24 hours) at 6/26/2023 1215  Last data filed at 6/26/2023 1200  Gross per 24 hour   Intake 2195.19 ml   Output 4555 ml   Net -2359.81 ml     Wt Readings from Last 4 Encounters:   06/27/23 75.3 kg (166 lb 0.1 oz)   04/01/23 68.8 kg (151 lb 9.6 oz)   02/11/23 60 kg (132 lb 4.4 oz)   12/02/22 61.3 kg (135 lb 2.3 oz)     Arterial Line BP: (111-183)/(52-83) 183/83  MAP:  [72 mmHg-120 mmHg] 120 mmHg  Vent Mode: CMV/AC  (Continuous Mandatory Ventilation/ Assist Control)  FiO2 (%): 55 %  Resp Rate (Set): 20 breaths/min  Tidal Volume (Set, mL): 270 mL  PEEP (cm H2O): 8 cmH2O  Resp: 20    Recent Labs   Lab 06/26/23  0351 06/25/23  0357 06/24/23  1809 06/24/23  0406   PH 7.36 7.30* 7.29* 7.27*   PCO2  76* 74* 71* 71*   PO2 92 100 74* 98   HCO3 43* 36* 34* 33*   O2PER 50 50 40 40       GEN: no acute distress consistent with chemical sedation  HEENT: head ncat, sclera anicteric, OP patent, trachea midline   PULM: unlabored synchronous with vent, diffuse wheezes anteriorly, chest tube in good position show signs of titling however no air leak  CV/COR: RRR S1S2 no gallop,  No rub, no murmur  ABD: soft nontender, hypoactive bowel sounds, no mass  EXT:  Edema   warm  NEURO: Does not follow commands, consistent with chemical sedation  SKIN: no obvious rash  LINES: clean, dry intact         Data:   All data and imaging reviewed     ROUTINE ICU LABS (Last four results)  CMP  Recent Labs   Lab 06/27/23  0753 06/27/23  0352 06/27/23  0348 06/27/23  0010 06/26/23  2100 06/26/23  1553 06/26/23  1408 06/26/23  0807 06/26/23  0352 06/25/23  0358 06/25/23  0357 06/24/23  1151 06/24/23  0902 06/24/23  0751 06/24/23  0407 06/21/23  0007 06/21/23  0003   NA  --   --  158*  --   --   --   --   --  147*  --  139  --   --   --  137   < > 138   POTASSIUM  --   --  4.1  4.1  --  4.2  --  4.3  --  4.3  4.3   < > 5.6*   < > 5.5*  --  5.5*   < > 5.3   CHLORIDE  --   --  110*  --   --   --   --   --  101  --  101  --   --   --  101   < > 99   CO2  --   --  45*  --   --   --   --   --  38*  --  32*  --   --   --  31*   < > 28   ANIONGAP  --   --  3*  --   --   --   --   --  8  --  6*  --   --   --  5*   < > 11   * 161* 183* 177*  --    < >  --    < > 170*   < > 158*   < >  --    < > 171*   < > 126*   BUN  --   --  41.0*  --   --   --   --   --  49.9*  --  48.2*  --   --   --  39.1*   < > 7.9*   CR  --   --  0.62  --   --   --   --   --  0.67  --  0.87  --   --   --  0.97*   < > 0.63   GFRESTIMATED  --   --  >90  --   --   --   --   --  >90  --  76  --   --   --  67   < > >90   EDIN  --   --  9.1  --   --   --   --   --  8.8  --  8.9  --   --   --  8.5*   < > 8.9   MAG  --   --  2.6*  --   --   --   --   --  2.4*  --  2.6*  --  2.6*   --   --    < > 2.5*   PHOS  --   --  3.0  --   --   --   --   --  3.8  --  4.7*  --  5.2*  --   --    < >  --    PROTTOTAL  --   --   --   --   --   --   --   --   --   --   --   --   --   --   --   --  7.5   ALBUMIN  --   --   --   --   --   --   --   --   --   --   --   --   --   --   --   --  4.5   BILITOTAL  --   --   --   --   --   --   --   --   --   --   --   --   --   --   --   --  0.2   ALKPHOS  --   --   --   --   --   --   --   --   --   --   --   --   --   --   --   --  76   AST  --   --   --   --   --   --   --   --   --   --   --   --   --   --   --   --  34   ALT  --   --   --   --   --   --   --   --   --   --   --   --   --   --   --   --  44    < > = values in this interval not displayed.     CBC  Recent Labs   Lab 06/27/23 0348 06/26/23 0352 06/25/23 0357 06/24/23  0407   WBC 10.3 8.7 10.7 12.8*   RBC 3.06* 2.96* 3.06* 2.94*   HGB 10.3* 9.8* 10.2* 9.8*   HCT 34.4* 31.9* 32.2* 30.9*   * 108* 105* 105*   MCH 33.7* 33.1* 33.3* 33.3*   MCHC 29.9* 30.7* 31.7 31.7   RDW 13.8 12.9 12.6 13.1    160 168 171     INR  Recent Labs   Lab 06/21/23  0003   INR 1.04     Arterial Blood Gas  Recent Labs   Lab 06/26/23 0351 06/25/23 0357 06/24/23  1809 06/24/23  0406   PH 7.36 7.30* 7.29* 7.27*   PCO2 76* 74* 71* 71*   PO2 92 100 74* 98   HCO3 43* 36* 34* 33*   O2PER 50 50 40 40       All cultures:  No results for input(s): CULT in the last 168 hours.  Recent Results (from the past 24 hour(s))   XR Chest Port 1 View    Narrative    CHEST ONE VIEW  6/26/2023 9:29 AM     HISTORY: Increasing oxygen requirements and airway pressure.    COMPARISON: June 23, 2023      Impression    IMPRESSION: Endotracheal tube tip 4.3 cm from the stevie. Right PICC  line stable. Minimal pleural fluid or pleural thickening bilaterally  similar to previous. Small-to-moderate pneumothorax on the left new  from previous.    Results called to the patient's nurse on June 26, 2023 at 9:44 AM.     GHAZALA ORELLANA MD          SYSTEM ID:  [id number]         Billing: This patient is critically ill: yes. Total critical care time today 32 min.          Patient doing well off paralytics Peak Airway pressure 36 down from 45. If continues to do well, will wean Ketamine in am    Jn Madrigal MD  Critical Care Medicine

## 2023-06-27 NOTE — PLAN OF CARE
ICU End of Shift Summary.  For vital signs and complete assessments, please see documentation flowsheets.      Pertinent assessments: Tmax 102.9. Remains medically paralyzed and sedated on full vent support. FiO2 titrated down. Minimal secretions. Tachycardic to 130's at start of shift and hypertensive. Tele hub ordered EKG, was febrile at the time, EKG confirmed ST. Cultures obtained, Abx started and prn apap given with improvement in all VS. No BM's, prn's given. Good urine output. Edema worsened from previous night. 2+ in BUE and BLE.   Major Shift Events:   - Temp 102.9  - blood cultures obtained,pending  - sputum culture sent, pending  - Abx started: Cleocin  - Prn apap given x 2- temp improved  -Tachycardic up to 130's resolved after apap  - Bp's elevated, now 110's  - FiO2 titrated down to 55% from 65%  - Bath completed  - Senokot and Miralax given  Plan (Upcoming Events): Continue Abx. Na elevated, increase free water flushes?  Discharge/Transfer Needs: TBD     Bedside Shift Report Completed : Y  Bedside Safety Check Completed: Y    Goal Outcome Evaluation:      Plan of Care Reviewed With: family    Overall Patient Progress: no changeOverall Patient Progress: no change

## 2023-06-28 ENCOUNTER — APPOINTMENT (OUTPATIENT)
Dept: GENERAL RADIOLOGY | Facility: CLINIC | Age: 58
End: 2023-06-28
Attending: INTERNAL MEDICINE
Payer: COMMERCIAL

## 2023-06-28 LAB
ALBUMIN UR-MCNC: 10 MG/DL
ALLEN'S TEST: ABNORMAL
ALLEN'S TEST: NO
ANION GAP SERPL CALCULATED.3IONS-SCNC: 2 MMOL/L (ref 7–15)
APPEARANCE UR: ABNORMAL
ATRIAL RATE - MUSE: NORMAL BPM
BASE EXCESS BLDA CALC-SCNC: 17 MMOL/L (ref -9–1.8)
BASE EXCESS BLDA CALC-SCNC: 19.5 MMOL/L (ref -9–1.8)
BILIRUB UR QL STRIP: NEGATIVE
BUN SERPL-MCNC: 36.1 MG/DL (ref 8–23)
CALCIUM SERPL-MCNC: 9 MG/DL (ref 8.6–10)
CHLORIDE SERPL-SCNC: 112 MMOL/L (ref 98–107)
COLOR UR AUTO: YELLOW
CREAT SERPL-MCNC: 0.57 MG/DL (ref 0.51–0.95)
DEPRECATED HCO3 PLAS-SCNC: 45 MMOL/L (ref 22–29)
DIASTOLIC BLOOD PRESSURE - MUSE: NORMAL MMHG
ERYTHROCYTE [DISTWIDTH] IN BLOOD BY AUTOMATED COUNT: 14.3 % (ref 10–15)
GFR SERPL CREATININE-BSD FRML MDRD: >90 ML/MIN/1.73M2
GLUCOSE BLDC GLUCOMTR-MCNC: 150 MG/DL (ref 70–99)
GLUCOSE BLDC GLUCOMTR-MCNC: 162 MG/DL (ref 70–99)
GLUCOSE BLDC GLUCOMTR-MCNC: 175 MG/DL (ref 70–99)
GLUCOSE BLDC GLUCOMTR-MCNC: 185 MG/DL (ref 70–99)
GLUCOSE BLDC GLUCOMTR-MCNC: 207 MG/DL (ref 70–99)
GLUCOSE BLDC GLUCOMTR-MCNC: 239 MG/DL (ref 70–99)
GLUCOSE SERPL-MCNC: 236 MG/DL (ref 70–99)
GLUCOSE UR STRIP-MCNC: NEGATIVE MG/DL
HCO3 BLD-SCNC: 46 MMOL/L (ref 21–28)
HCO3 BLD-SCNC: 50 MMOL/L (ref 21–28)
HCT VFR BLD AUTO: 35.1 % (ref 35–47)
HGB BLD-MCNC: 10.2 G/DL (ref 11.7–15.7)
HGB UR QL STRIP: ABNORMAL
INTERPRETATION ECG - MUSE: NORMAL
KETONES UR STRIP-MCNC: NEGATIVE MG/DL
LEUKOCYTE ESTERASE UR QL STRIP: ABNORMAL
MAGNESIUM SERPL-MCNC: 2.5 MG/DL (ref 1.7–2.3)
MCH RBC QN AUTO: 33.3 PG (ref 26.5–33)
MCHC RBC AUTO-ENTMCNC: 29.1 G/DL (ref 31.5–36.5)
MCV RBC AUTO: 115 FL (ref 78–100)
MRSA DNA SPEC QL NAA+PROBE: NEGATIVE
MUCOUS THREADS #/AREA URNS LPF: PRESENT /LPF
NITRATE UR QL: NEGATIVE
O2/TOTAL GAS SETTING VFR VENT: 60 %
O2/TOTAL GAS SETTING VFR VENT: 60 %
OXYHGB MFR BLD: 88 % (ref 92–100)
P AXIS - MUSE: NORMAL DEGREES
PCO2 BLD: 85 MM HG (ref 35–45)
PCO2 BLD: 99 MM HG (ref 35–45)
PH BLD: 7.31 [PH] (ref 7.35–7.45)
PH BLD: 7.35 [PH] (ref 7.35–7.45)
PH UR STRIP: 5.5 [PH] (ref 5–7)
PHOSPHATE SERPL-MCNC: 2.7 MG/DL (ref 2.5–4.5)
PLATELET # BLD AUTO: 138 10E3/UL (ref 150–450)
PO2 BLD: 58 MM HG (ref 80–105)
PO2 BLD: 88 MM HG (ref 80–105)
POTASSIUM SERPL-SCNC: 3.9 MMOL/L (ref 3.4–5.3)
POTASSIUM SERPL-SCNC: 3.9 MMOL/L (ref 3.4–5.3)
PR INTERVAL - MUSE: NORMAL MS
PROCALCITONIN SERPL IA-MCNC: 0.08 NG/ML
QRS DURATION - MUSE: 64 MS
QT - MUSE: 280 MS
QTC - MUSE: 422 MS
R AXIS - MUSE: 75 DEGREES
RBC # BLD AUTO: 3.06 10E6/UL (ref 3.8–5.2)
RBC URINE: >182 /HPF
SA TARGET DNA: NEGATIVE
SODIUM SERPL-SCNC: 158 MMOL/L (ref 136–145)
SODIUM SERPL-SCNC: 159 MMOL/L (ref 136–145)
SODIUM SERPL-SCNC: 159 MMOL/L (ref 136–145)
SODIUM SERPL-SCNC: 160 MMOL/L (ref 136–145)
SP GR UR STRIP: 1.01 (ref 1–1.03)
SYSTOLIC BLOOD PRESSURE - MUSE: NORMAL MMHG
T AXIS - MUSE: 51 DEGREES
UROBILINOGEN UR STRIP-MCNC: NORMAL MG/DL
VENTRICULAR RATE- MUSE: 137 BPM
WBC # BLD AUTO: 13.4 10E3/UL (ref 4–11)
WBC CLUMPS #/AREA URNS HPF: PRESENT /HPF
WBC URINE: >182 /HPF

## 2023-06-28 PROCEDURE — 250N000013 HC RX MED GY IP 250 OP 250 PS 637: Performed by: INTERNAL MEDICINE

## 2023-06-28 PROCEDURE — 81001 URINALYSIS AUTO W/SCOPE: CPT | Performed by: INTERNAL MEDICINE

## 2023-06-28 PROCEDURE — 99233 SBSQ HOSP IP/OBS HIGH 50: CPT | Performed by: INTERNAL MEDICINE

## 2023-06-28 PROCEDURE — 87040 BLOOD CULTURE FOR BACTERIA: CPT | Performed by: INTERNAL MEDICINE

## 2023-06-28 PROCEDURE — 250N000011 HC RX IP 250 OP 636: Mod: JZ | Performed by: ANESTHESIOLOGY

## 2023-06-28 PROCEDURE — 80048 BASIC METABOLIC PNL TOTAL CA: CPT | Performed by: INTERNAL MEDICINE

## 2023-06-28 PROCEDURE — 82805 BLOOD GASES W/O2 SATURATION: CPT | Performed by: INTERNAL MEDICINE

## 2023-06-28 PROCEDURE — 94640 AIRWAY INHALATION TREATMENT: CPT | Mod: 76

## 2023-06-28 PROCEDURE — 200N000001 HC R&B ICU

## 2023-06-28 PROCEDURE — 82803 BLOOD GASES ANY COMBINATION: CPT | Performed by: ANESTHESIOLOGY

## 2023-06-28 PROCEDURE — 87641 MR-STAPH DNA AMP PROBE: CPT | Performed by: INTERNAL MEDICINE

## 2023-06-28 PROCEDURE — 250N000009 HC RX 250: Performed by: INTERNAL MEDICINE

## 2023-06-28 PROCEDURE — 250N000013 HC RX MED GY IP 250 OP 250 PS 637: Performed by: HOSPITALIST

## 2023-06-28 PROCEDURE — 83735 ASSAY OF MAGNESIUM: CPT | Performed by: INTERNAL MEDICINE

## 2023-06-28 PROCEDURE — 999N000065 XR ABDOMEN PORT 1 VIEW

## 2023-06-28 PROCEDURE — 258N000003 HC RX IP 258 OP 636: Performed by: INTERNAL MEDICINE

## 2023-06-28 PROCEDURE — 250N000011 HC RX IP 250 OP 636: Performed by: SURGERY

## 2023-06-28 PROCEDURE — 94003 VENT MGMT INPAT SUBQ DAY: CPT

## 2023-06-28 PROCEDURE — 999N000157 HC STATISTIC RCP TIME EA 10 MIN

## 2023-06-28 PROCEDURE — 36415 COLL VENOUS BLD VENIPUNCTURE: CPT | Performed by: INTERNAL MEDICINE

## 2023-06-28 PROCEDURE — 84295 ASSAY OF SERUM SODIUM: CPT | Performed by: ANESTHESIOLOGY

## 2023-06-28 PROCEDURE — 87086 URINE CULTURE/COLONY COUNT: CPT | Performed by: INTERNAL MEDICINE

## 2023-06-28 PROCEDURE — 99291 CRITICAL CARE FIRST HOUR: CPT | Performed by: ANESTHESIOLOGY

## 2023-06-28 PROCEDURE — 85027 COMPLETE CBC AUTOMATED: CPT | Performed by: INTERNAL MEDICINE

## 2023-06-28 PROCEDURE — 250N000011 HC RX IP 250 OP 636: Performed by: ANESTHESIOLOGY

## 2023-06-28 PROCEDURE — 250N000011 HC RX IP 250 OP 636: Performed by: INTERNAL MEDICINE

## 2023-06-28 PROCEDURE — 84145 PROCALCITONIN (PCT): CPT | Performed by: INTERNAL MEDICINE

## 2023-06-28 PROCEDURE — 84100 ASSAY OF PHOSPHORUS: CPT | Performed by: INTERNAL MEDICINE

## 2023-06-28 RX ORDER — DILTIAZEM HYDROCHLORIDE 30 MG/1
90 TABLET, FILM COATED ORAL EVERY 6 HOURS SCHEDULED
Status: DISCONTINUED | OUTPATIENT
Start: 2023-06-28 | End: 2023-06-29

## 2023-06-28 RX ORDER — METHYLPREDNISOLONE SODIUM SUCCINATE 40 MG/ML
40 INJECTION, POWDER, LYOPHILIZED, FOR SOLUTION INTRAMUSCULAR; INTRAVENOUS 2 TIMES DAILY
Status: DISCONTINUED | OUTPATIENT
Start: 2023-06-28 | End: 2023-06-30

## 2023-06-28 RX ORDER — VANCOMYCIN HYDROCHLORIDE 1 G/200ML
1000 INJECTION, SOLUTION INTRAVENOUS EVERY 12 HOURS
Status: DISCONTINUED | OUTPATIENT
Start: 2023-06-28 | End: 2023-06-29 | Stop reason: DRUGHIGH

## 2023-06-28 RX ORDER — DILTIAZEM HYDROCHLORIDE 30 MG/1
30 TABLET, FILM COATED ORAL ONCE
Status: COMPLETED | OUTPATIENT
Start: 2023-06-28 | End: 2023-06-28

## 2023-06-28 RX ORDER — FUROSEMIDE 10 MG/ML
20 INJECTION INTRAMUSCULAR; INTRAVENOUS ONCE
Status: COMPLETED | OUTPATIENT
Start: 2023-06-28 | End: 2023-06-28

## 2023-06-28 RX ORDER — MEROPENEM 1 G/1
1 INJECTION, POWDER, FOR SOLUTION INTRAVENOUS EVERY 8 HOURS
Status: DISCONTINUED | OUTPATIENT
Start: 2023-06-28 | End: 2023-06-28

## 2023-06-28 RX ORDER — CEFEPIME HYDROCHLORIDE 2 G/1
2 INJECTION, POWDER, FOR SOLUTION INTRAVENOUS EVERY 8 HOURS
Status: DISCONTINUED | OUTPATIENT
Start: 2023-06-28 | End: 2023-06-30

## 2023-06-28 RX ORDER — DEXTROSE MONOHYDRATE 50 MG/ML
INJECTION, SOLUTION INTRAVENOUS CONTINUOUS
Status: DISCONTINUED | OUTPATIENT
Start: 2023-06-28 | End: 2023-06-29 | Stop reason: DRUGHIGH

## 2023-06-28 RX ADMIN — CLINDAMYCIN PHOSPHATE 900 MG: 900 INJECTION, SOLUTION INTRAVENOUS at 04:46

## 2023-06-28 RX ADMIN — DEXTROSE AND SODIUM CHLORIDE: 5; 450 INJECTION, SOLUTION INTRAVENOUS at 12:02

## 2023-06-28 RX ADMIN — DILTIAZEM HYDROCHLORIDE 60 MG: 30 TABLET, FILM COATED ORAL at 05:52

## 2023-06-28 RX ADMIN — NICOTINE 1 PATCH: 14 PATCH, EXTENDED RELEASE TRANSDERMAL at 08:06

## 2023-06-28 RX ADMIN — LEVALBUTEROL HYDROCHLORIDE 0.63 MG: 0.63 SOLUTION RESPIRATORY (INHALATION) at 12:28

## 2023-06-28 RX ADMIN — Medication 40 MG: at 08:03

## 2023-06-28 RX ADMIN — METHYLPREDNISOLONE SODIUM SUCCINATE 62.5 MG: 125 INJECTION INTRAMUSCULAR; INTRAVENOUS at 08:03

## 2023-06-28 RX ADMIN — INSULIN ASPART 1 UNITS: 100 INJECTION, SOLUTION INTRAVENOUS; SUBCUTANEOUS at 19:59

## 2023-06-28 RX ADMIN — APIXABAN 5 MG: 5 TABLET, FILM COATED ORAL at 08:03

## 2023-06-28 RX ADMIN — INSULIN ASPART 2 UNITS: 100 INJECTION, SOLUTION INTRAVENOUS; SUBCUTANEOUS at 03:44

## 2023-06-28 RX ADMIN — INSULIN ASPART 2 UNITS: 100 INJECTION, SOLUTION INTRAVENOUS; SUBCUTANEOUS at 23:57

## 2023-06-28 RX ADMIN — LEVOTHYROXINE SODIUM 112 MCG: 0.11 TABLET ORAL at 08:06

## 2023-06-28 RX ADMIN — PROPOFOL 60 MCG/KG/MIN: 10 INJECTION, EMULSION INTRAVENOUS at 19:56

## 2023-06-28 RX ADMIN — DILTIAZEM HYDROCHLORIDE 30 MG: 30 TABLET, FILM COATED ORAL at 13:33

## 2023-06-28 RX ADMIN — INSULIN ASPART 1 UNITS: 100 INJECTION, SOLUTION INTRAVENOUS; SUBCUTANEOUS at 08:21

## 2023-06-28 RX ADMIN — CEFEPIME 2 G: 2 INJECTION, POWDER, FOR SOLUTION INTRAVENOUS at 10:14

## 2023-06-28 RX ADMIN — IPRATROPIUM BROMIDE 0.5 MG: 0.5 SOLUTION RESPIRATORY (INHALATION) at 19:42

## 2023-06-28 RX ADMIN — SENNOSIDES 10 ML: 8.8 LIQUID ORAL at 05:54

## 2023-06-28 RX ADMIN — FUROSEMIDE 20 MG: 10 INJECTION, SOLUTION INTRAMUSCULAR; INTRAVENOUS at 08:39

## 2023-06-28 RX ADMIN — PAROXETINE 20 MG: 10 SUSPENSION ORAL at 08:04

## 2023-06-28 RX ADMIN — LEVALBUTEROL HYDROCHLORIDE 0.63 MG: 0.63 SOLUTION RESPIRATORY (INHALATION) at 07:41

## 2023-06-28 RX ADMIN — Medication 10 MG: at 22:17

## 2023-06-28 RX ADMIN — Medication 200 MCG/HR: at 21:41

## 2023-06-28 RX ADMIN — PROPOFOL 35 MCG/KG/MIN: 10 INJECTION, EMULSION INTRAVENOUS at 02:24

## 2023-06-28 RX ADMIN — DILTIAZEM HYDROCHLORIDE 90 MG: 30 TABLET, FILM COATED ORAL at 23:05

## 2023-06-28 RX ADMIN — DEXTROSE MONOHYDRATE: 50 INJECTION, SOLUTION INTRAVENOUS at 19:33

## 2023-06-28 RX ADMIN — HYDRALAZINE HYDROCHLORIDE 25 MG: 25 TABLET, FILM COATED ORAL at 05:53

## 2023-06-28 RX ADMIN — INSULIN ASPART 2 UNITS: 100 INJECTION, SOLUTION INTRAVENOUS; SUBCUTANEOUS at 15:54

## 2023-06-28 RX ADMIN — Medication 50 MCG: at 22:19

## 2023-06-28 RX ADMIN — DEXTROSE MONOHYDRATE 1000 ML: 50 INJECTION, SOLUTION INTRAVENOUS at 13:33

## 2023-06-28 RX ADMIN — LEVALBUTEROL HYDROCHLORIDE 0.63 MG: 0.63 SOLUTION RESPIRATORY (INHALATION) at 19:39

## 2023-06-28 RX ADMIN — HYDRALAZINE HYDROCHLORIDE 25 MG: 25 TABLET, FILM COATED ORAL at 13:33

## 2023-06-28 RX ADMIN — VANCOMYCIN HYDROCHLORIDE 1000 MG: 1 INJECTION, SOLUTION INTRAVENOUS at 22:51

## 2023-06-28 RX ADMIN — Medication 15 ML: at 08:03

## 2023-06-28 RX ADMIN — HYDRALAZINE HYDROCHLORIDE 25 MG: 25 TABLET, FILM COATED ORAL at 21:42

## 2023-06-28 RX ADMIN — IPRATROPIUM BROMIDE 0.5 MG: 0.5 SOLUTION RESPIRATORY (INHALATION) at 07:41

## 2023-06-28 RX ADMIN — INSULIN ASPART 1 UNITS: 100 INJECTION, SOLUTION INTRAVENOUS; SUBCUTANEOUS at 00:01

## 2023-06-28 RX ADMIN — IPRATROPIUM BROMIDE 0.5 MG: 0.5 SOLUTION RESPIRATORY (INHALATION) at 15:10

## 2023-06-28 RX ADMIN — DILTIAZEM HYDROCHLORIDE 60 MG: 30 TABLET, FILM COATED ORAL at 12:04

## 2023-06-28 RX ADMIN — ATORVASTATIN CALCIUM 20 MG: 20 TABLET, FILM COATED ORAL at 08:06

## 2023-06-28 RX ADMIN — CEFEPIME 2 G: 2 INJECTION, POWDER, FOR SOLUTION INTRAVENOUS at 17:00

## 2023-06-28 RX ADMIN — LEVALBUTEROL HYDROCHLORIDE 0.63 MG: 0.63 SOLUTION RESPIRATORY (INHALATION) at 15:10

## 2023-06-28 RX ADMIN — METHYLPREDNISOLONE SODIUM SUCCINATE 40 MG: 40 INJECTION INTRAMUSCULAR; INTRAVENOUS at 19:59

## 2023-06-28 RX ADMIN — PROPOFOL 40 MCG/KG/MIN: 10 INJECTION, EMULSION INTRAVENOUS at 10:13

## 2023-06-28 RX ADMIN — VANCOMYCIN HYDROCHLORIDE 1750 MG: 5 INJECTION, POWDER, LYOPHILIZED, FOR SOLUTION INTRAVENOUS at 11:58

## 2023-06-28 RX ADMIN — INSULIN ASPART 1 UNITS: 100 INJECTION, SOLUTION INTRAVENOUS; SUBCUTANEOUS at 11:58

## 2023-06-28 RX ADMIN — DILTIAZEM HYDROCHLORIDE 90 MG: 30 TABLET, FILM COATED ORAL at 17:00

## 2023-06-28 RX ADMIN — IPRATROPIUM BROMIDE 0.5 MG: 0.5 SOLUTION RESPIRATORY (INHALATION) at 12:28

## 2023-06-28 RX ADMIN — Medication 175 MCG/HR: at 08:24

## 2023-06-28 RX ADMIN — APIXABAN 5 MG: 5 TABLET, FILM COATED ORAL at 21:42

## 2023-06-28 ASSESSMENT — ACTIVITIES OF DAILY LIVING (ADL)
ADLS_ACUITY_SCORE: 36

## 2023-06-28 NOTE — PLAN OF CARE
ICU End of Shift Summary.  For vital signs and complete assessments, please see documentation flowsheets.      Pertinent assessments: Tmax 102.6. Prn apap given with minimal change, ice packed and temp resolved eventually. No further temps overnight. Sedated with rass of -5 throughout night. Titrated Fent and Prop down. LS diminished with exp wheezes at times. Minimal secretions. Remains on full vent support with FiO2 titrated down to 45% . Tele SA/ST with occasional runs of self limiting SVT. BP's mostly 140's-150's overnight now more 120's-130's. Good urine output overnight, urine more cloudy than last 4 nights. No Bm's overnight, senokot given this morning. Na rising despite free water flushes increased, tele hub called and ordered D5 1/2 NS. BUE and BLE remain edematous 2-3+.   Major Shift Events:   -Bath completed  - D5 1/2 NS started @ 100/hr for Na 160  -Prop titrated down from 45 to 30  -Fent titrated down from 200 to 175  - PIP's 30's, off paralytic   - Prn Tylenol given for temp 102.6  Plan (Upcoming Events): Continue Abx. Follow temps. Cultures pending.  Discharge/Transfer Needs: TBD     Bedside Shift Report Completed : Y  Bedside Safety Check Completed: Y    Goal Outcome Evaluation:      Plan of Care Reviewed With: family    Overall Patient Progress: no changeOverall Patient Progress: no change

## 2023-06-28 NOTE — PROGRESS NOTES
[facility] ICU VENTILATOR RESPIRATORY NOTE  Date of Admission: 6/21/23  Date of Intubation (most recent):6/21/23   Reason for Mechanical Ventilation: Respiratory Failure  Number of Days on Mechanical Ventilation: 8  Met Criteria for Pressure Support Trial: No pt is to sedated and getting continuous fentanyl and ketamine  ETT appearance on chest x-ray: In appropriate place      Respiratory Therapy  Patient remains intubated on mechanical ventilator with the following settings:    Vent Mode: CMV/AC  (Continuous Mandatory Ventilation/ Assist Control)  FiO2 (%): 50 %  Resp Rate (Set): 22 breaths/min  Tidal Volume (Set, mL): 270 mL  PEEP (cm H2O): 8 cmH2O  Inspiratory Pressure (cm H2O) (Drager Norma): 45  Resp: 22    Temp: 100  F (37.8  C) Temp src: Bladder BP: 124/55 Pulse: 120   Resp: 22 SpO2: 96 % O2 Device: Mechanical Ventilator      BS were diminished with expiratory wheezes. Suctioned a moderate amount of cloudy thin secretions through the ETT.  Will continue to follow and assess the patient's respiratory needs.    Cortney Martin, RT  6/28/2023 5:24 PM

## 2023-06-28 NOTE — PLAN OF CARE
Goal Outcome Evaluation: progressing  Plan of care reviewed with patient and daughter          ICU End of Shift Summary.  For vital signs and complete assessments, please see documentation flowsheets.      Pertinent assessments:   Neuro:sedated on propofol and Fentanyl, RASS -5,  CPOT 0  Cardiac: tele; -120's. Unsustained SVT RUNS, MD aware, hx of afib, on Cardizem and Eliquis. BP stable  Resp: on full vent support, FIO2 60%, RR 22,  and peep of 8. Moderate secretions suctioned through ETT, Chest Tube WDL  GI: abdomen distended, obese, one loose stool, TF at goal  : El WDL, good UOP  Skin: no changes  Lines: PICC, PIV, ART line,  Drips: Ketamine, Prop, Fentanyl    Major Shift Events:     Ketamine dose cut in half    Lasix one time given    IVF decreased to 50 cc/hr    Water flushes increased to 250 cc     MRSA swab sent    El Exchanged    UA and UC sent    Blood Cx sent    New abx started    Vent adjustment made    1330 HR sustain between 120-140, MD notified and Cardizem increased    D 5 bolus given due to NA level of 160    IVF discontinued    Steroids decreased     Keofeed Placed  Plan (Upcoming Events): continue vent management, wean as able, continue IV abx, follow up labs and Cx  Discharge/Transfer Needs: TBD     Bedside Shift Report Completed : yes  Bedside Safety Check Completed: yes       Notified provider about indwelling el catheter discussed removal or continued need.    Did provider choose to remove indwelling el catheter? no    Provider's el indication for keeping indwelling el catheter: Indication for continued use: Deep Sedation/Paralysis    Is there an order for indwelling el catheter? YES    *If there is a plan to keep el catheter in place at discharge daily notification with provider is not necessary, but please add a notation in the treatment team sticky note that the patient will be discharging with the catheter.        Addendum: last na level 158, Tele ICU  notified, awaiting orders

## 2023-06-28 NOTE — PROGRESS NOTES
[facility] ICU VENTILATOR RESPIRATORY NOTE    Date of Admission: 6/20/23    Date of Intubation (most recent): 6/21/23    Reason for Mechanical Ventilation: Respiratory Failure    Number of Days on Mechanical Ventilation: 8    Met Criteria for Pressure Support Trial: No    Vent Mode: CMV/AC  (Continuous Mandatory Ventilation/ Assist Control)  FiO2 (%): 45 %  Resp Rate (Set): 20 breaths/min  Tidal Volume (Set, mL): 270 mL  PEEP (cm H2O): 8 cmH2O  Resp: 20    Overnight Events/changes: Pt O2 titrated down to 45%, SpO2 remains mid-high 90s.  Pt with increased PIPs to 50 during neb treatment  Breath Sounds: coarse, diminished with expiratory wheezes  Secretions: small to moderate, cloudy, thin to thick secretions  Treatments: Atrovent and Xopenex given QID.      Plan: Will continue to monitor and assess the pt's current respiratory status and needs.      Radhika Jacobson RT on 6/28/2023 at 4:36 AM

## 2023-06-28 NOTE — PROVIDER NOTIFICATION
The following results reported to Dr. Monroy at 1105 am:      Status: Final result          Component Ref Range & Units 06/28/23 0952   pH Arterial 7.35 - 7.45 7.31 Low     pCO2 Arterial 35 - 45 mm Hg 99 High Panic     pO2 Arterial 80 - 105 mm Hg 88    FIO2  60    Comment: ventilator   Bicarbonate Arterial 21 - 28 mmol/L 50 High Panic     Base Excess/Deficit (+/-) -9.0 - 1.8 mmol/L 19.5 High     David's Test  No    Resulting Agency  [facility]        Specimen Collected: 06/28/23 0952 Last Resulted: 06/28/23 1108

## 2023-06-28 NOTE — PROVIDER NOTIFICATION
Tele hub called for Na level of 160, previous level 158, free water flushes then increased to 225 q 4 hours.     Order received for D5 1/2 NS at 100\hr. Na levels ordered q 6 hours.

## 2023-06-28 NOTE — PROGRESS NOTES
[facility]  Hospitalist Progress Note  Albin Navarro MD 06/28/23    Reason for Stay (Diagnosis): Respiratory failure, COPD exacerbation, pneumothorax         Assessment and Plan:      Summary of Stay:   Lara Melendez is a 57 year old female with PMH including COPD, emphysema, as needed 2-3 L O2 at home, tobacco dependence, hypertension, anxiety, paroxysmal atrial fibrillation on Eliquis, hypothyroidism, psoriasis, and hyperlipidemia who presented on 6/21 with worsening shortness of breath and wheezing.  Symptom onset was a few days prior to presentation and consisting of wheezing so her pulmonologist prescribed azithromycin and prednisone.  She took about 3 days of these medications.  Due to continued decline she called EMS and was brought to the ED for evaluation.     In the emergency department she had a clear COPD exacerbation so was given IV steroids, IV magnesium, multiple nebulizers and placed on BiPAP.  Imaging showed no clear evidence of pneumonia but she remained on azithromycin.  She was admitted to the ICU.     The day following admission her respiratory condition deteriorated to the point that she required intubation and mechanical ventilation.  She had significant bronchospasm and high airway pressures so required deep sedation with fentanyl, ketamine and propofol with addition of vecuronium.  She was given a continuous albuterol nebulizer with not much improvement.  Pulmonology was consulted as well.  The case was even discussed with [facility] and she is not an ECMO candidate.  Repeat x-ray 6/26 showed a left-sided pneumothorax and IR placed a chest tube, thoracic surgery was consulted.  Worsening hypernatremia with tube feeds, free water increased.  She developed a fever to 102  F and she was started on IV clindamycin 6/26.  Persistent fevers and now leukocytosis antibiotics broadened to vancomycin and cefepime, blood and urine culture sent.  She has weaned off of vecuronium and we are  weaning ketamine.    Problem List/Assessment and Plan:   Acute on chronic  hypoxemic and hypercapnic respiratory failure  COPD with acute exacerbation  Left-sided pneumothorax  Possible pneumonia: At baseline she is on chronic oxygen of 2 to 3 L by nasal cannula as needed.  She has had several admissions for severe COPD requiring BiPAP, although has not previously been intubated.  Initially during this hospitalization she was on BiPAP but she quickly deteriorated so was intubated 6/21.  She developed high airway pressures and bronchospasm so required deep sedation with fentanyl, ketamine, and propofol.  Vecuronium was added due to persistent high peak pressures. Continuous albuterol nebulizer for 24 hours provided no benefit and even seem to increase bronchospasm and has been discontinued.    Chest x-ray 6/26 showed a left-sided pneumothorax likely barotrauma in setting of emphysema and high airway pressures due to severe COPD exacerbation.  Chest tube was placed by IR.  -Intensivist to manage ventilator  -Pulmonology consulted  -Currently using low tidal volume strategy given high ventilator pressures resulting in pneumothorax.  Due to this she does have increasing CO2 retention, but is for the most part compensating with a pH of 7.31, PCO2 99, and HCO3 of 50.  Plan to slightly increased respiratory rate, but overall allowing a compensated respiratory acidosis  -Decrease IV methylprednisolone to 40 mg twice daily   -Scheduled Xopenex and Atrovent nebs   -Finished a 5-day course of azithromycin   -Currently on IV fentanyl, ketamine, and propofol infusions.  Weaning ketamine as able in case this is contributing to tachycardia and hypertension   -Vecuronium stopped 6/27  -IV clindamycin started 6/26 due to fever above 102  F.  She does have a confirmed penicillin rash.  Persistent fever and no leukocytosis.  Stop IV clindamycin and start IV vancomycin pharmacy to dose and IV cefepime 2 g every 8 hours  -Blood cultures  6/26 NGTD.  Repeating blood culture 6/28.    -Sputum culture from 6/26 now growing GNR's  -Sent MRSA nasal screen  -Hines catheter was removed and urinalysis was obtained that shows large blood, large LE, >182 WBCs and RBCs so a UTI is possible, but given the large blood it does make the pyuria difficult to interpret and we will await culture results  -Thoracic surgery consulted for chest tube management   -Dr. Ovalles discussed the case with [facility] on she is not an ECMO candidate due to her chronic respiratory failure.    Paroxysmal atrial fibrillation: More tachycardic today which may be secondary to the fevers or alternatively ketamine.  PTA on diltiazem and Eliquis.  -Eliquis resumed  -Increase oral diltiazem to 90 mg from 60 mg every 6 hours, if persistent tachycardia into the 130 range then increase oral diltiazem or start diltiazem drip  -Avoiding beta-blockers due to severe COPD    Hyperkalemia: Potassium bola to 5.5 and persisted in that range for a day or 2.  She received several doses of Lokelma and potassium level has now normalized.  Unclear etiology.  Renal function is normal.  Her PTA losartan has been on hold.    Hypernatremia: Significant rise in sodium now up to 160 with tube feed initiation.  -Free water increased to 280 mL every 4 hours  -Follow sodium levels every 6 hours  -Depending on sodium level at noon, may need to stop D5 1/2 NS fluids and bolus D5 as needed  Addendum: Sodium still 160.  Discontinue D5 1/2 NS fluids.  Give D5 bolus 1 L over 4 hours then recheck sodium level.  If improving then I will order continuous D5 infusion.  Continue free water via  mL every 4 hours    Steroid-induced hyperglycemia: Continue medium sliding scale aspart.  Reasonable glycemic control at this time with most blood sugars in the 150 range.    Lactic acidosis: Likely due to nebulizers and hypoxia.     Anxiety: Holding prior to admission clonazepam.  Continue paroxetine and as needed IV  "lorazepam.    HTN: Initially had some soft blood pressure but the past 48 hours has been quite hypertensive.    This may be secondary to ketamine.    -Wean ketamine as able   -Continue PTA diltiazem   -Hesitant to resume losartan with recent issues involving hyperkalemia and hesitant to start beta-blockers with her severe reactive airway disease.  For now scheduling hydralazine 25 mg 3 times daily and can increase this if needed.    -As needed IV hydralazine available as needed.    Tobacco dependence: Nicotine replacement.    Chronic anemia: Hemoglobin at baseline of 10-11.    DVT Prophylaxis: Eliquis  Code Status: Full Code  Lines: PICC line, arterial line, PIV, Hines catheter, NG, left-sided chest tube  FEN: Tube feeds, dietitian consulted, increasing free water as above.  Currently on D5 1/2 NS at 100 ml/hr  Discharge Dispo: TBD  Estimated Disch Date / # of Days until Disch: Continue ICU level cares, anticipate ongoing prolonged hospitalization    I will update spouse Luis later today    Clinically Significant Risk Factors         # Hypernatremia: Highest Na = 160 mmol/L in last 2 days, will monitor as appropriate          # Hypertension: Noted on problem list        # Overweight: Estimated body mass index is 29.48 kg/m  as calculated from the following:    Height as of this encounter: 1.6 m (5' 3\").    Weight as of this encounter: 75.5 kg (166 lb 7.2 oz).                       Interval History (Subjective):      Sodium up to 160, D5 1/2 NS fluids started and free water via NG increase today.  Febrile again to 102.6  F.  Broadening antibiotics to vancomycin and cefepime.  Repeating blood cultures and sending urine culture as well.  Off vecuronium for 24 hours.  Weaning ketamine as able.  Intermittently in SVT likely A-fib with RVR with rates up to 120.  Urine output 3.5 L.                  Physical Exam:      Last Vital Signs:  /55   Pulse 117   Temp 98.6  F (37  C) (Axillary)   Resp 20   Ht 1.6 m (5' " "3\")   Wt 75.5 kg (166 lb 7.2 oz)   SpO2 97%   BMI 29.48 kg/m      Intake/Output Summary (Last 24 hours) at 6/28/2023 1119  Last data filed at 6/28/2023 1100  Gross per 24 hour   Intake 4285.66 ml   Output 4010 ml   Net 275.66 ml       Constitutional: Intubated and sedated  Eyes: sclera white   HEENT: ET tube  Respiratory: No focal crackles or wheezing anteriorly.  Left-sided chest tube in place  Cardiovascular: Irregularly, regular rhythm, tachycardic, no murmur appreciated  GI: non-tender, not distended, bowel sounds present  Genitourinary: Hines catheter with yellow urine output  Skin: no rash    Musculoskeletal/extremities: 1+ bilateral lower extremity edema  Neurologic: Sedated  Psychiatric: calm          Medications:      All current medications were reviewed with changes reflected in problem list.         Data:      All new lab and imaging data were personally reviewed.   Labs:  Recent Labs   Lab 06/28/23  0733 06/28/23  0346 06/28/23  0344 06/28/23  0001 06/27/23  2253 06/27/23  1953 06/27/23  1856 06/27/23  1551 06/27/23  1214 06/27/23  0352 06/27/23  0348 06/26/23  0807 06/26/23  0352   NA  --  159*  159*  --   --  160*  --  158*  --  158*  --  158*  --  147*   POTASSIUM  --  3.9  3.9  --   --  4.1  --   --   --  4.4  --  4.1  4.1   < > 4.3  4.3   CHLORIDE  --  112*  --   --   --   --   --   --   --   --  110*  --  101   CO2  --  45*  --   --   --   --   --   --   --   --  45*  --  38*   ANIONGAP  --  2*  --   --   --   --   --   --   --   --  3*  --  8   * 236* 207*   < >  --    < >  --    < >  --    < > 183*   < > 170*   BUN  --  36.1*  --   --   --   --   --   --   --   --  41.0*  --  49.9*   CR  --  0.57  --   --   --   --   --   --   --   --  0.62  --  0.67   GFRESTIMATED  --  >90  --   --   --   --   --   --   --   --  >90  --  >90   EDIN  --  9.0  --   --   --   --   --   --   --   --  9.1  --  8.8    < > = values in this interval not displayed.     Recent Labs   Lab 06/28/23  0346 " 06/27/23  0348 06/26/23  0352   WBC 13.4* 10.3 8.7   HGB 10.2* 10.3* 9.8*   HCT 35.1 34.4* 31.9*   * 112* 108*   * 153 160      Venous Blood Gas  Recent Labs   Lab 06/28/23  0952 06/26/23  0351 06/25/23  0357 06/24/23  1809 06/23/23  0827 06/23/23  0550 06/22/23  2110 06/22/23  1902 06/22/23  0741 06/22/23  0518 06/21/23  1524   PHV  --   --   --   --   --  7.21*  --  7.16*  --  7.26* 7.36   PCO2V  --   --   --   --   --  92*  --  83*  --  69* 55*   PO2V  --   --   --   --   --  56*  --  59*  --  62* 59*   HCO3V  --   --   --   --   --  37*  --  30*  --  31* 31*   SUSANNAH  --   --   --   --   --  6.2*  --  -1.1  --  2.0* 4.5*   O2PER 60 50 50 40   < > 40   < > 40   < > 50 30    < > = values in this interval not displayed.     Color Urine (no units)   Date Value   06/28/2023 Yellow   01/29/2006 [place]     Appearance Urine (no units)   Date Value   06/28/2023 Cloudy (A)   01/29/2006 Clear     Glucose Urine (mg/dL)   Date Value   06/28/2023 Negative   01/29/2006 Negative     Bilirubin Urine (no units)   Date Value   06/28/2023 Negative   01/29/2006 Small (A)     Ketones Urine (mg/dL)   Date Value   06/28/2023 Negative   01/29/2006 Negative     Specific Gravity Urine (no units)   Date Value   06/28/2023 1.010   01/29/2006 1.015     pH Urine   Date Value   06/28/2023 5.5   01/30/2006 6.5 pH     Protein Albumin Urine (mg/dL)   Date Value   06/28/2023 10 (A)   01/29/2006 Negative     Urobilinogen Urine (EU/dL)   Date Value   01/29/2006 0.2     Nitrite Urine (no units)   Date Value   06/28/2023 Negative   01/29/2006 Negative     Leukocyte Esterase Urine (no units)   Date Value   06/28/2023 Large (A)   01/29/2006 Negative     All cultures:  Recent Labs   Lab 06/26/23 2112 06/26/23  2100 06/26/23 2059   CULTURE No growth after 1 day No growth after 1 day Culture in progress  1+ Normal fadi   Procalcitonin 0.08    Imaging:   None today    Albin Navarro MD

## 2023-06-28 NOTE — PHARMACY-VANCOMYCIN DOSING SERVICE
Pharmacy Vancomycin Initial Note  Date of Service 2023  Patient's  1965  57 year old, female    Indication: Sepsis    Current estimated CrCl = Estimated Creatinine Clearance: 103.3 mL/min (based on SCr of 0.57 mg/dL).    Creatinine for last 3 days  2023:  3:52 AM Creatinine 0.67 mg/dL  2023:  3:48 AM Creatinine 0.62 mg/dL  2023:  3:46 AM Creatinine 0.57 mg/dL    Contrast Orders - past 72 hours (72h ago, onward)    None        [employer] Prediction of Planned Initial Vancomycin Regimen  Loading dose: 1750 mg at 12:00 2023.  Regimen: 1000 mg IV every 12 hours.  Exposure target: AUC24 (range)400-600 mg/L.hr   AUC24,ss: 427 mg/L.hr  Probability of AUC24 > 400: 57 %  Ctrough,ss: 12.5 mg/L  Probability of Ctrough,ss > 20: 17 %  Probability of nephrotoxicity (Lodise ZOHAIB ): 8 %        Plan:  1. Start vancomycin 1750 mg IV loading dose, followed by 1000 mg IV q12h.   2. Vancomycin monitoring method: AUC  3. Vancomycin therapeutic monitoring goal: 400-600 mg*h/L  4. Pharmacy will check vancomycin levels as appropriate in 1-3 Days.    5. Serum creatinine levels will be ordered daily for the first week of therapy and at least twice weekly for subsequent weeks.      Al Youngblood [facility]

## 2023-06-28 NOTE — PROGRESS NOTES
Critical Care  Note      06/28/2023    Name: Lara Melendez MRN#: [id number]   Age: 57 year old YOB: 1965     Hsptl Day# 7  ICU DAY # 7    MV DAY # 7             Problem List:   Principal Problem:    Paroxysmal atrial fibrillation with RVR (H)  Active Problems:    COPD exacerbation (H)    Anticoagulated    Acute and chronic respiratory failure with hypercapnia (H)           Summary/Hospital Course:   57-year-old female with history of COPD, emphysema, O2 dependent at home, tobacco dependence, hypertension, anxiety, atrial fibrillation on Eliquis, hypothyroidism presents with worsening shortness of breath and wheezing.  She appears to be in status asthmaticus versus worsening COPD exacerbation.  Patient was prescribed azithromycin and prednisone prior to admission.  She continued decline was brought to EMS for evaluation.  Where she was given IV steroids, IV magnesium nebulizers and BiPAP.  After admission to the ICU she failed a BiPAP trial and required intubation mechanical ventilation.  She had significant bronchospasm with high airway pressures requiring deep sedation with fentanyl ketamine and propofol and addition of a paralytic.  Events of last 24 hours noted for patient being weaned off of paralytic which she is tolerated.  Ketamine dose was cut in half.  Patient continues to have chest tube with minimal output.          Assessment and plan :     Lara Melendez IS a 57 year old female admitted on 6/20/2023 for, acute respiratory failure secondary to COPD exacerbation, small left-sided pneumothorax and history of atrial fibrillation on Eliquis..   I have personally reviewed the daily labs, imaging studies, cultures and discussed the case with referring physician and consulting physicians.     My assessment and plan by system for this patient is as follows:    Neurology/Psychiatry:   1.  Continues require deep sedation given her dyssynchrony with the vent  2.  Continue ketamine for possible  bronchodilatation  3.      Plan  Patient is now off paralytic, tolerating current respiratory settings.  No need for paralytic for vent synchrony.  We will now attempt to wean deep sedation for step will be to decrease ketamine dosing.  Would have low threshold for resuming ketamine if patient develop worsening bronchospasm    Cardiovascular:   1.Hemodynamics -normotensive to hypertensive  2.Rhythm -sinus rhythm but patient does have history of atrial fibrillation on presentation  3. Ischemia -no signs of ischemia  Plan  Patient does have a history of atrial fibrillation, appears to be in sinus tach at this time.  May be related to ketamine usage.  Continue to monitor heart rate, continue Eliquis for patient's history of atrial fibrillation.  We will continue to treat patient's hypertension with hydralazine as needed.      Pulmonary/Ventilator Management:   1. Airway intubated for acute ventilatory failure  2. Oxygenation/ventilation/mechanics on full mechanical ventilatory support with 50% FiO2 and 8 of PEEP.  Plan  -Continue patient on current regimen, continue patient on full mechanical ventilatory support.  Chest tube shows minimal output  Airway pressures are slightly better today.  We will decrease methylprednisolone by half today.  Continue to monitor peak and plateau airway pressures best as possible given patient's bronchospasm.  Patient is tolerating weaning of the ketamine and paralytic at this time.  Patient also with partially compensated chronic respiratory acidosis secondary to lung protective strategies.  Will increase respiratory rate as long as we avoid gas trapping to try to fully compensate for this chronic respiratory acidosis.      GI and Nutrition :   1.  Concern for protein calorie malnutrition    Plan  -Resume enteral feeds.    Renal/Fluids/Electrolytes:   1.  No acute renal injury at this time  2.  significant hypernatremia  3.  Compensated respiratory acidosis  4.  Appears  hypervolemic  Plan  -We will continue to monitor, hyperkalemia seems to be resolving  - monitor function and electrolytes as needed with replacement per ICU protocols. - generally avoid nephrotoxic agents such as NSAID, IV contrast unless specifically required  - adjust medications as needed for renal clearance  - follow I/O's as appropriate.  -Continue to observe hypernatremia may need to increase free water again  -Increase sodium checks to every 4 hours      Infectious Disease:   1.  No active issues    Plan  -Continue to monitor, patient was febrile overnight, appreciate night float input with regards to starting patient on broad-spectrum antibiotics and obtaining pancultures.  We will continue the patient on broad-spectrum antibiotics while awaiting culture results.    Endocrine:   1.  Concern for stress-induced hyperglycemia  2.  Concern for diabetes induced hyperglycemia  3.  Plan  - ICU insulin protocol, goal sugar <180      Hematology/Oncology:   1.  No leukocytosis  2.  Mild anemia most likely secondary to hemodilution  3.  On Eliquis for atrial fibrillation  Plan  -Continue to monitor     IV/Access:   1. Venous access -central access  2. Arterial access -arterial line  3.  Plan  - central access required and necessary      ICU Prophylaxis:   1. DVT: On Eliquis  2. VAP: HOB 30 degrees, chlorhexidine rinse  3. Stress Ulcer: PPI/H2 blocker  4. Restraints: Nonviolent soft two point restraints required and necessary for patient safety and continued cares and good effect as patient continues to pull at necessary lines, tubes despite education and distraction. Will readdress daily.   5. Wound care  -local wound care  6. Feeding -continue tube feeds  7. Family Update: I did not update family today  8. Disposition -ICU        Key goals for next 24 hours:   1.  Continue off paralytic  2.  Continue to wean ketamine  3.  Increase free water given hypernatremia               Medical History:     Past Medical History:    Diagnosis Date     Anxiety      COPD (chronic obstructive pulmonary disease) - 2L home O2      Diverticulitis of colon      Hypercholesterolemia      Hypertension      Hypothyroidism      Paroxysmal atrial fibrillation      Psoriasis      Pulmonary nodule - left upper lobe      Past Surgical History:   Procedure Laterality Date     CHOLECYSTECTOMY       INCISION AND DRAINAGE MANDIBLE, COMBINED Left 01/10/2022    Procedure: INCISION AND DRAINAGE, MANDIBLE;  Surgeon: Jn Feliz DDS;  Location: [facility] OR     INCISION AND DRAINAGE MANDIBLE, COMBINED Left 02/04/2022    Procedure: INCISION AND DRAINAGE, MANDIBLE;  Surgeon: Taylor Ortez DDS;  Location: [facility] OR     TOOTH EXTRACTION       Vocal Cord surgery       Social History     Socioeconomic History     Marital status:      Spouse name: Not on file     Number of children: Not on file     Years of education: Not on file     Highest education level: Not on file   Occupational History     Not on file   Tobacco Use     Smoking status: Never     Smokeless tobacco: Never   Substance and Sexual Activity     Alcohol use: Yes     Comment: occ     Drug use: Never     Sexual activity: Not Currently   Other Topics Concern     Not on file   Social History Narrative     Not on file     Social Determinants of Health     Financial Resource Strain: Not on file   Food Insecurity: Not on file   Transportation Needs: Not on file   Physical Activity: Not on file   Stress: Not on file   Social Connections: Not on file   Intimate Partner Violence: Not on file   Housing Stability: Not on file        Allergies   Allergen Reactions     Sulfa Antibiotics      Doxycycline Other (See Comments)     Develops chest tightness  Intolerance not allergy     Penicillins Rash     Generalized rash  Rash, Generalized                Key Medications:       apixaban ANTICOAGULANT  5 mg Per Feeding Tube BID     atorvastatin  20 mg Per Feeding Tube Daily     ceFEPIme  2 g Intravenous Q8H     diltiazem   60 mg Oral or Feeding Tube Q6H ELZBIETA     hydrALAZINE  25 mg Oral Q8H ELZBIETA     insulin aspart  1-6 Units Subcutaneous Q4H     ipratropium  0.5 mg Nebulization 4x daily     levalbuterol  0.63 mg Nebulization 4x Daily     levothyroxine  112 mcg Per Feeding Tube Daily     methylPREDNISolone  62.5 mg Intravenous BID     multivitamins w/minerals  15 mL Per Feeding Tube Daily     nicotine  1 patch Transdermal Daily     nicotine   Transdermal Q8H     pantoprazole  40 mg Per Feeding Tube Daily     PARoxetine  20 mg Per Feeding Tube Daily     protein modular  1 packet Per Feeding Tube BID     sodium chloride (PF)  10-40 mL Intracatheter Q8H     sodium chloride (PF)  3 mL Intracatheter Q8H       dextrose 5% and 0.45% NaCl 50 mL/hr at 06/28/23 1100     fentaNYL 175 mcg/hr (06/28/23 0900)     ketamine 26.8 mg/hr (06/28/23 1015)     lactated ringers Stopped (06/28/23 0011)     propofol 40 mcg/kg/min (06/28/23 1013)    And     - MEDICATION INSTRUCTIONS -       - MEDICATION INSTRUCTIONS -          Home Meds  No current facility-administered medications on file prior to encounter.  albuterol (PROAIR HFA/PROVENTIL HFA/VENTOLIN HFA) 108 (90 Base) MCG/ACT inhaler, Inhale 2 puffs into the lungs every 4 hours as needed for shortness of breath / dyspnea or wheezing Inhale 1-2 Puffs every 4 hours as needed for Wheezing.  albuterol (PROVENTIL) (2.5 MG/3ML) 0.083% neb solution, Take 1 vial (2.5 mg) by nebulization every 4 hours as needed for shortness of breath or wheezing  apixaban ANTICOAGULANT (ELIQUIS) 5 MG tablet, Take 1 tablet (5 mg) by mouth 2 times daily  atorvastatin (LIPITOR) 20 MG tablet, Take 20 mg by mouth daily  clonazePAM (KLONOPIN) 0.5 MG tablet, Take 0.5 mg by mouth daily  cyclobenzaprine (FLEXERIL) 5 MG tablet, Take 1 tablet (5 mg) by mouth every 8 hours as needed for muscle spasms  diclofenac (VOLTAREN) 1 % topical gel, Apply 2 g topically 4 times daily  diltiazem ER COATED BEADS (CARDIZEM CD/CARTIA XT) 240 MG 24 hr capsule,  Take 1 capsule (240 mg) by mouth daily  fluticasone-salmeterol (ADVAIR-HFA) 230-21 MCG/ACT inhaler, Inhale 2 puffs into the lungs 2 times daily  ipratropium - albuterol 0.5 mg/2.5 mg/3 mL (DUONEB) 0.5-2.5 (3) MG/3ML neb solution, Take 1 vial (3 mLs) by nebulization every 6 hours as needed for shortness of breath / dyspnea or wheezing  levothyroxine (SYNTHROID/LEVOTHROID) 112 MCG tablet, Take 112 mcg by mouth daily  losartan (COZAAR) 25 MG tablet, Take 1 tablet (25 mg) by mouth daily  nicotine (NICODERM CQ) 21 MG/24HR 24 hr patch, Place 1 patch onto the skin daily  PARoxetine (PAXIL) 20 MG tablet, Take 20 mg by mouth daily  predniSONE (DELTASONE) 20 MG tablet, Take 60 mg for 2 days, then 40 mg for 3 days, then 20 mg for 3 days, then 10 mg for 3 days, then stop  tiotropium (SPIRIVA RESPIMAT) 2.5 MCG/ACT inhaler, Inhale 2 puffs into the lungs daily as needed               Physical Examination:   Temp:  [96.8  F (36  C)-102.6  F (39.2  C)] 100.2  F (37.9  C)  Pulse:  [] 120  Resp:  [20] 20  BP: (124-155)/(55-67) 124/55  MAP:  [75 mmHg-112 mmHg] 92 mmHg  Arterial Line BP: (118-181)/(31-78) 141/60  FiO2 (%):  [45 %-65 %] 60 %  SpO2:  [85 %-99 %] 95 %    Intake/Output Summary (Last 24 hours) at 6/26/2023 1215  Last data filed at 6/26/2023 1200  Gross per 24 hour   Intake 2195.19 ml   Output 4555 ml   Net -2359.81 ml     Wt Readings from Last 4 Encounters:   06/28/23 75.5 kg (166 lb 7.2 oz)   04/01/23 68.8 kg (151 lb 9.6 oz)   02/11/23 60 kg (132 lb 4.4 oz)   12/02/22 61.3 kg (135 lb 2.3 oz)     Arterial Line BP: (118-181)/(31-78) 141/60  MAP:  [75 mmHg-112 mmHg] 92 mmHg  Vent Mode: CMV/AC  (Continuous Mandatory Ventilation/ Assist Control)  FiO2 (%): 60 %  Resp Rate (Set): 20 breaths/min  Tidal Volume (Set, mL): 270 mL  PEEP (cm H2O): 8 cmH2O  Inspiratory Pressure (cm H2O) (Drager Norma): 45  Resp: 20    Recent Labs   Lab 06/28/23  0952 06/26/23  0351 06/25/23  0357 06/24/23  1809   PH 7.31* 7.36 7.30* 7.29*   PCO2  99* 76* 74* 71*   PO2 88 92 100 74*   HCO3 50* 43* 36* 34*   O2PER 60 50 50 40       GEN: no acute distress consistent with chemical sedation  HEENT: head ncat, sclera anicteric, OP patent, trachea midline   PULM: unlabored synchronous with vent, diffuse wheezes anteriorly, chest tube in good position show signs of titling however no air leak  CV/COR: RRR S1S2 no gallop,  No rub, no murmur  ABD: soft nontender, hypoactive bowel sounds, no mass  EXT:  Edema   warm  NEURO: Does not follow commands, consistent with chemical sedation  SKIN: no obvious rash  LINES: clean, dry intact         Data:   All data and imaging reviewed     ROUTINE ICU LABS (Last four results)  CMP  Recent Labs   Lab 06/28/23  0733 06/28/23  0346 06/28/23  0344 06/28/23  0001 06/27/23  2253 06/27/23  1953 06/27/23  1856 06/27/23  1551 06/27/23  1214 06/27/23  0352 06/27/23  0348 06/26/23  0807 06/26/23  0352 06/25/23  0358 06/25/23  0357   NA  --  159*  159*  --   --  160*  --  158*  --  158*  --  158*  --  147*  --  139   POTASSIUM  --  3.9  3.9  --   --  4.1  --   --   --  4.4  --  4.1  4.1   < > 4.3  4.3   < > 5.6*   CHLORIDE  --  112*  --   --   --   --   --   --   --   --  110*  --  101  --  101   CO2  --  45*  --   --   --   --   --   --   --   --  45*  --  38*  --  32*   ANIONGAP  --  2*  --   --   --   --   --   --   --   --  3*  --  8  --  6*   * 236* 207* 175*  --    < >  --    < >  --    < > 183*   < > 170*   < > 158*   BUN  --  36.1*  --   --   --   --   --   --   --   --  41.0*  --  49.9*  --  48.2*   CR  --  0.57  --   --   --   --   --   --   --   --  0.62  --  0.67  --  0.87   GFRESTIMATED  --  >90  --   --   --   --   --   --   --   --  >90  --  >90  --  76   EDIN  --  9.0  --   --   --   --   --   --   --   --  9.1  --  8.8  --  8.9   MAG  --  2.5*  --   --   --   --   --   --   --   --  2.6*  --  2.4*  --  2.6*   PHOS  --  2.7  --   --   --   --   --   --   --   --  3.0  --  3.8  --  4.7*    < > = values in this  interval not displayed.     CBC  Recent Labs   Lab 06/28/23  0346 06/27/23  0348 06/26/23  0352 06/25/23  0357   WBC 13.4* 10.3 8.7 10.7   RBC 3.06* 3.06* 2.96* 3.06*   HGB 10.2* 10.3* 9.8* 10.2*   HCT 35.1 34.4* 31.9* 32.2*   * 112* 108* 105*   MCH 33.3* 33.7* 33.1* 33.3*   MCHC 29.1* 29.9* 30.7* 31.7   RDW 14.3 13.8 12.9 12.6   * 153 160 168     INR  No lab results found in last 7 days.  Arterial Blood Gas  Recent Labs   Lab 06/28/23  0952 06/26/23  0351 06/25/23  0357 06/24/23  1809   PH 7.31* 7.36 7.30* 7.29*   PCO2 99* 76* 74* 71*   PO2 88 92 100 74*   HCO3 50* 43* 36* 34*   O2PER 60 50 50 40       All cultures:  No results for input(s): CULT in the last 168 hours.  Recent Results (from the past 24 hour(s))   XR Chest Port 1 View    Narrative    CHEST ONE VIEW  6/26/2023 9:29 AM     HISTORY: Increasing oxygen requirements and airway pressure.    COMPARISON: June 23, 2023      Impression    IMPRESSION: Endotracheal tube tip 4.3 cm from the stevie. Right PICC  line stable. Minimal pleural fluid or pleural thickening bilaterally  similar to previous. Small-to-moderate pneumothorax on the left new  from previous.    Results called to the patient's nurse on June 26, 2023 at 9:44 AM.     GHAZALA ORELLANA MD         SYSTEM ID:  [id number]         Billing: This patient is critically ill: yes. Total critical care time today 31 min.            Jn Madrigal MD  Critical Care Medicine

## 2023-06-29 LAB
ANION GAP SERPL CALCULATED.3IONS-SCNC: 3 MMOL/L (ref 7–15)
BUN SERPL-MCNC: 33.9 MG/DL (ref 8–23)
CALCIUM SERPL-MCNC: 8.4 MG/DL (ref 8.6–10)
CHLORIDE SERPL-SCNC: 102 MMOL/L (ref 98–107)
CK SERPL-CCNC: 227 U/L (ref 26–192)
CREAT SERPL-MCNC: 0.51 MG/DL (ref 0.51–0.95)
DEPRECATED HCO3 PLAS-SCNC: 43 MMOL/L (ref 22–29)
ERYTHROCYTE [DISTWIDTH] IN BLOOD BY AUTOMATED COUNT: 14.2 % (ref 10–15)
GFR SERPL CREATININE-BSD FRML MDRD: >90 ML/MIN/1.73M2
GLUCOSE BLDC GLUCOMTR-MCNC: 132 MG/DL (ref 70–99)
GLUCOSE BLDC GLUCOMTR-MCNC: 149 MG/DL (ref 70–99)
GLUCOSE BLDC GLUCOMTR-MCNC: 151 MG/DL (ref 70–99)
GLUCOSE BLDC GLUCOMTR-MCNC: 190 MG/DL (ref 70–99)
GLUCOSE BLDC GLUCOMTR-MCNC: 197 MG/DL (ref 70–99)
GLUCOSE BLDC GLUCOMTR-MCNC: 205 MG/DL (ref 70–99)
GLUCOSE SERPL-MCNC: 205 MG/DL (ref 70–99)
HCT VFR BLD AUTO: 30.7 % (ref 35–47)
HGB BLD-MCNC: 9 G/DL (ref 11.7–15.7)
MAGNESIUM SERPL-MCNC: 2.5 MG/DL (ref 1.7–2.3)
MCH RBC QN AUTO: 33 PG (ref 26.5–33)
MCHC RBC AUTO-ENTMCNC: 29.3 G/DL (ref 31.5–36.5)
MCV RBC AUTO: 113 FL (ref 78–100)
PLATELET # BLD AUTO: 105 10E3/UL (ref 150–450)
POTASSIUM SERPL-SCNC: 4.1 MMOL/L (ref 3.4–5.3)
RBC # BLD AUTO: 2.73 10E6/UL (ref 3.8–5.2)
SODIUM SERPL-SCNC: 148 MMOL/L (ref 136–145)
SODIUM SERPL-SCNC: 148 MMOL/L (ref 136–145)
SODIUM SERPL-SCNC: 149 MMOL/L (ref 136–145)
SODIUM SERPL-SCNC: 149 MMOL/L (ref 136–145)
SODIUM SERPL-SCNC: 153 MMOL/L (ref 136–145)
VANCOMYCIN SERPL-MCNC: 16.4 UG/ML
WBC # BLD AUTO: 12 10E3/UL (ref 4–11)

## 2023-06-29 PROCEDURE — 94640 AIRWAY INHALATION TREATMENT: CPT | Mod: 76

## 2023-06-29 PROCEDURE — 84295 ASSAY OF SERUM SODIUM: CPT | Performed by: ANESTHESIOLOGY

## 2023-06-29 PROCEDURE — 258N000003 HC RX IP 258 OP 636: Performed by: INTERNAL MEDICINE

## 2023-06-29 PROCEDURE — 85014 HEMATOCRIT: CPT | Performed by: INTERNAL MEDICINE

## 2023-06-29 PROCEDURE — 84478 ASSAY OF TRIGLYCERIDES: CPT | Performed by: SURGERY

## 2023-06-29 PROCEDURE — 83735 ASSAY OF MAGNESIUM: CPT | Performed by: INTERNAL MEDICINE

## 2023-06-29 PROCEDURE — 250N000009 HC RX 250: Performed by: INTERNAL MEDICINE

## 2023-06-29 PROCEDURE — 99291 CRITICAL CARE FIRST HOUR: CPT | Performed by: ANESTHESIOLOGY

## 2023-06-29 PROCEDURE — 82550 ASSAY OF CK (CPK): CPT | Performed by: SURGERY

## 2023-06-29 PROCEDURE — 82374 ASSAY BLOOD CARBON DIOXIDE: CPT | Performed by: INTERNAL MEDICINE

## 2023-06-29 PROCEDURE — 250N000013 HC RX MED GY IP 250 OP 250 PS 637: Performed by: INTERNAL MEDICINE

## 2023-06-29 PROCEDURE — 84295 ASSAY OF SERUM SODIUM: CPT | Performed by: INTERNAL MEDICINE

## 2023-06-29 PROCEDURE — 250N000013 HC RX MED GY IP 250 OP 250 PS 637: Performed by: SURGERY

## 2023-06-29 PROCEDURE — 94003 VENT MGMT INPAT SUBQ DAY: CPT

## 2023-06-29 PROCEDURE — 250N000013 HC RX MED GY IP 250 OP 250 PS 637: Performed by: HOSPITALIST

## 2023-06-29 PROCEDURE — 80202 ASSAY OF VANCOMYCIN: CPT | Performed by: INTERNAL MEDICINE

## 2023-06-29 PROCEDURE — 200N000001 HC R&B ICU

## 2023-06-29 PROCEDURE — 999N000157 HC STATISTIC RCP TIME EA 10 MIN

## 2023-06-29 PROCEDURE — 250N000011 HC RX IP 250 OP 636: Performed by: ANESTHESIOLOGY

## 2023-06-29 PROCEDURE — 99233 SBSQ HOSP IP/OBS HIGH 50: CPT | Performed by: INTERNAL MEDICINE

## 2023-06-29 PROCEDURE — 250N000011 HC RX IP 250 OP 636: Performed by: SURGERY

## 2023-06-29 PROCEDURE — 82310 ASSAY OF CALCIUM: CPT | Performed by: INTERNAL MEDICINE

## 2023-06-29 PROCEDURE — 250N000011 HC RX IP 250 OP 636: Performed by: INTERNAL MEDICINE

## 2023-06-29 RX ORDER — FUROSEMIDE 10 MG/ML
20 INJECTION INTRAMUSCULAR; INTRAVENOUS 2 TIMES DAILY
Status: COMPLETED | OUTPATIENT
Start: 2023-06-29 | End: 2023-06-29

## 2023-06-29 RX ORDER — DILTIAZEM HYDROCHLORIDE 30 MG/1
60 TABLET, FILM COATED ORAL EVERY 6 HOURS SCHEDULED
Status: DISCONTINUED | OUTPATIENT
Start: 2023-06-29 | End: 2023-07-02

## 2023-06-29 RX ORDER — GUAR GUM
1 PACKET (EA) ORAL 3 TIMES DAILY
Status: DISCONTINUED | OUTPATIENT
Start: 2023-06-29 | End: 2023-07-01

## 2023-06-29 RX ADMIN — Medication 40 MG: at 08:17

## 2023-06-29 RX ADMIN — METHYLPREDNISOLONE SODIUM SUCCINATE 40 MG: 40 INJECTION INTRAMUSCULAR; INTRAVENOUS at 19:51

## 2023-06-29 RX ADMIN — PROPOFOL 70 MCG/KG/MIN: 10 INJECTION, EMULSION INTRAVENOUS at 00:08

## 2023-06-29 RX ADMIN — Medication 15 ML: at 08:15

## 2023-06-29 RX ADMIN — ATORVASTATIN CALCIUM 20 MG: 20 TABLET, FILM COATED ORAL at 08:15

## 2023-06-29 RX ADMIN — LEVOTHYROXINE SODIUM 112 MCG: 0.11 TABLET ORAL at 08:15

## 2023-06-29 RX ADMIN — PROPOFOL 70 MCG/KG/MIN: 10 INJECTION, EMULSION INTRAVENOUS at 19:59

## 2023-06-29 RX ADMIN — Medication 10 MG: at 22:11

## 2023-06-29 RX ADMIN — INSULIN ASPART 1 UNITS: 100 INJECTION, SOLUTION INTRAVENOUS; SUBCUTANEOUS at 12:02

## 2023-06-29 RX ADMIN — CEFEPIME 2 G: 2 INJECTION, POWDER, FOR SOLUTION INTRAVENOUS at 10:04

## 2023-06-29 RX ADMIN — HYDRALAZINE HYDROCHLORIDE 25 MG: 25 TABLET, FILM COATED ORAL at 21:34

## 2023-06-29 RX ADMIN — DILTIAZEM HYDROCHLORIDE 60 MG: 30 TABLET, FILM COATED ORAL at 23:00

## 2023-06-29 RX ADMIN — PROPOFOL 70 MCG/KG/MIN: 10 INJECTION, EMULSION INTRAVENOUS at 07:06

## 2023-06-29 RX ADMIN — Medication 200 MCG/HR: at 22:23

## 2023-06-29 RX ADMIN — IPRATROPIUM BROMIDE 0.5 MG: 0.5 SOLUTION RESPIRATORY (INHALATION) at 11:39

## 2023-06-29 RX ADMIN — VANCOMYCIN HYDROCHLORIDE 1000 MG: 1 INJECTION, SOLUTION INTRAVENOUS at 11:52

## 2023-06-29 RX ADMIN — PAROXETINE 20 MG: 10 SUSPENSION ORAL at 08:17

## 2023-06-29 RX ADMIN — INSULIN ASPART 2 UNITS: 100 INJECTION, SOLUTION INTRAVENOUS; SUBCUTANEOUS at 04:47

## 2023-06-29 RX ADMIN — Medication 50 MCG: at 22:22

## 2023-06-29 RX ADMIN — Medication 1 PACKET: at 21:33

## 2023-06-29 RX ADMIN — LEVALBUTEROL HYDROCHLORIDE 0.63 MG: 0.63 SOLUTION RESPIRATORY (INHALATION) at 11:38

## 2023-06-29 RX ADMIN — Medication 200 MCG/HR: at 10:04

## 2023-06-29 RX ADMIN — NICOTINE 1 PATCH: 14 PATCH, EXTENDED RELEASE TRANSDERMAL at 08:15

## 2023-06-29 RX ADMIN — PROPOFOL 70 MCG/KG/MIN: 10 INJECTION, EMULSION INTRAVENOUS at 04:05

## 2023-06-29 RX ADMIN — APIXABAN 5 MG: 5 TABLET, FILM COATED ORAL at 21:34

## 2023-06-29 RX ADMIN — IPRATROPIUM BROMIDE 0.5 MG: 0.5 SOLUTION RESPIRATORY (INHALATION) at 16:02

## 2023-06-29 RX ADMIN — FUROSEMIDE 20 MG: 10 INJECTION, SOLUTION INTRAMUSCULAR; INTRAVENOUS at 11:51

## 2023-06-29 RX ADMIN — Medication 1 PACKET: at 16:34

## 2023-06-29 RX ADMIN — APIXABAN 5 MG: 5 TABLET, FILM COATED ORAL at 08:15

## 2023-06-29 RX ADMIN — DILTIAZEM HYDROCHLORIDE 60 MG: 30 TABLET, FILM COATED ORAL at 11:51

## 2023-06-29 RX ADMIN — METHYLPREDNISOLONE SODIUM SUCCINATE 40 MG: 40 INJECTION INTRAMUSCULAR; INTRAVENOUS at 08:15

## 2023-06-29 RX ADMIN — PROPOFOL 70 MCG/KG/MIN: 10 INJECTION, EMULSION INTRAVENOUS at 13:54

## 2023-06-29 RX ADMIN — CEFEPIME 2 G: 2 INJECTION, POWDER, FOR SOLUTION INTRAVENOUS at 17:50

## 2023-06-29 RX ADMIN — INSULIN ASPART 1 UNITS: 100 INJECTION, SOLUTION INTRAVENOUS; SUBCUTANEOUS at 08:37

## 2023-06-29 RX ADMIN — VANCOMYCIN HYDROCHLORIDE 1250 MG: 5 INJECTION, POWDER, LYOPHILIZED, FOR SOLUTION INTRAVENOUS at 22:27

## 2023-06-29 RX ADMIN — ACETAMINOPHEN 650 MG: 325 SOLUTION ORAL at 13:33

## 2023-06-29 RX ADMIN — DEXTROSE MONOHYDRATE: 50 INJECTION, SOLUTION INTRAVENOUS at 04:51

## 2023-06-29 RX ADMIN — PROPOFOL 70 MCG/KG/MIN: 10 INJECTION, EMULSION INTRAVENOUS at 16:34

## 2023-06-29 RX ADMIN — IPRATROPIUM BROMIDE 0.5 MG: 0.5 SOLUTION RESPIRATORY (INHALATION) at 08:21

## 2023-06-29 RX ADMIN — LEVALBUTEROL HYDROCHLORIDE 0.63 MG: 0.63 SOLUTION RESPIRATORY (INHALATION) at 20:03

## 2023-06-29 RX ADMIN — PROPOFOL 70 MCG/KG/MIN: 10 INJECTION, EMULSION INTRAVENOUS at 10:40

## 2023-06-29 RX ADMIN — INSULIN ASPART 2 UNITS: 100 INJECTION, SOLUTION INTRAVENOUS; SUBCUTANEOUS at 16:34

## 2023-06-29 RX ADMIN — DILTIAZEM HYDROCHLORIDE 60 MG: 30 TABLET, FILM COATED ORAL at 17:51

## 2023-06-29 RX ADMIN — Medication 10 MG: at 22:37

## 2023-06-29 RX ADMIN — FUROSEMIDE 20 MG: 10 INJECTION, SOLUTION INTRAMUSCULAR; INTRAVENOUS at 21:34

## 2023-06-29 RX ADMIN — HYDRALAZINE HYDROCHLORIDE 25 MG: 25 TABLET, FILM COATED ORAL at 13:33

## 2023-06-29 RX ADMIN — IPRATROPIUM BROMIDE 0.5 MG: 0.5 SOLUTION RESPIRATORY (INHALATION) at 20:03

## 2023-06-29 RX ADMIN — LEVALBUTEROL HYDROCHLORIDE 0.63 MG: 0.63 SOLUTION RESPIRATORY (INHALATION) at 08:20

## 2023-06-29 RX ADMIN — LEVALBUTEROL HYDROCHLORIDE 0.63 MG: 0.63 SOLUTION RESPIRATORY (INHALATION) at 16:02

## 2023-06-29 RX ADMIN — CEFEPIME 2 G: 2 INJECTION, POWDER, FOR SOLUTION INTRAVENOUS at 01:57

## 2023-06-29 ASSESSMENT — ACTIVITIES OF DAILY LIVING (ADL)
ADLS_ACUITY_SCORE: 36
ADLS_ACUITY_SCORE: 32
ADLS_ACUITY_SCORE: 36

## 2023-06-29 NOTE — PROGRESS NOTES
CLINICAL NUTRITION SERVICES - REASSESSMENT NOTE     Nutrition Prescription      Malnutrition Status:    % Intake: Decreased intake does not meet criteria--meeting needs via enteral nutrition + modulars + propofol  % Weight Loss: None noted--interpretation of weights confounded by fluid status  Subcutaneous Fat Loss: None observed  Muscle Loss: None observed- difficult to assess with edema  Fluid Accumulation/Edema: Mild  Malnutrition Diagnosis: Patient does not meet two of the established criteria necessary for diagnosing malnutrition    Recommendations already ordered by Registered Dietitian (RD):  Enteral Nutrition - Modify schedule - noted TF not being held for levothyroxine--adjust to Vital HP @ 35 ml/hr x 22 hrs (770 ml), which provides 770 kcal, 67 g protein, 85 g CHO, 0 g fiber, 642 ml free H2O    Continue Prosource TF 20 x 2 pkts/day= 160 kcal/40 g protein    Add 3 pkts Nutrisource fiber= 45 kcal/9 g fiber (total)    Propofol, as noted above, provides 715 kcal/day at current rate    Total energy/protein provisions, all sources- 1690 kcal (26 kcal/kg, 105% upper end of estimated needs)/107 g (~1.7 g/kg) protein    Future/Additional Recommendations:  Monitor propofol, respiratory status, GI function--adjust EN as appropriate     EVALUATION OF THE PROGRESS TOWARD GOALS   Diet: NPO    Nutrition Support:      Access: NJ tube (6/28)    Formula/Rate/Provisions: Vital High Protein @ goal of  35ml/hr  (840ml/day)  will provide: 840 kcals, 73 g PRO, 702 ml free H20, 93 g CHO, and 0 g fiber daily.    Flushes: H2O- 60 ml q 4 hrs--MD managing, pt was requiring very high flushes over past few days    Modulars: Prosource TF- 2 pkts/day= 160 kcal/40 g protein    Propofol at current rate provides 715 kcal/day    Total energy/protein provisions: 1715 kcal (~27 kcal/kg, 106% high end estimated needs)/113 g protein (~1.8 g/kg) per DW 64.5 kg    Intake/Tolerance:  Average intake over past 2 days (TF advance to current goal on  6/26) of 823 ml (98%) prescribed volume     NEW FINDINGS   General: patient discussed during IDT rounds this morning, is being diuresed d/t fluid overload--MD watching sodiums closely d/t recent hypernatremia, which has improved.    Weight: per I/Os pt is +6 liters since admission, weight is up  Date/Time Weight Weight Method   06/29/23 0430 78.2 kg (172 lb 6.4 oz) Bed scale   06/28/23 0556 75.5 kg (166 lb 7.2 oz) Bed scale   06/27/23 0600 75.3 kg (166 lb 0.1 oz) Bed scale   06/26/23 0615 76.4 kg (168 lb 6.9 oz) Bed scale   06/25/23 0530 77 kg (169 lb 12.1 oz) Bed scale   06/24/23 0600 73.7 kg (162 lb 7.7 oz) Bed scale   06/23/23 0345 67.7 kg (149 lb 4 oz) Bed scale   06/21/23 0304 64.5 kg (142 lb 3.2 oz) Bed scale     Labs:   - Na 149 (148 this morning)  - BUN 33.9  - Mg 2.5  Recent Labs   Lab 06/29/23  1202 06/29/23  0822 06/29/23  0549 06/29/23  0427 06/28/23  2357 06/28/23  1956   * 149* 205* 197* 205* 185*     GI: diarrhea x 3 today, pt previously without stool x 6 days, first stool this admission on 6/27    Skin: no pressure injuries noted, left arm skin tears    Meds:     apixaban ANTICOAGULANT  5 mg Per Feeding Tube BID     atorvastatin  20 mg Per Feeding Tube Daily     ceFEPIme  2 g Intravenous Q8H     diltiazem  60 mg Oral or Feeding Tube Q6H ELZBIETA     furosemide  20 mg Intravenous BID     hydrALAZINE  25 mg Oral Q8H ELZBIETA     insulin aspart  1-6 Units Subcutaneous Q4H     ipratropium  0.5 mg Nebulization 4x daily     levalbuterol  0.63 mg Nebulization 4x Daily     levothyroxine  112 mcg Per Feeding Tube Daily     methylPREDNISolone  40 mg Intravenous BID     multivitamins w/minerals  15 mL Per Feeding Tube Daily     nicotine  1 patch Transdermal Daily     nicotine   Transdermal Q8H     pantoprazole  40 mg Per Feeding Tube Daily     PARoxetine  20 mg Per Feeding Tube Daily     protein modular  1 packet Per Feeding Tube BID     sodium chloride (PF)  10-40 mL Intracatheter Q8H     sodium chloride (PF)  3  mL Intracatheter Q8H     vancomycin  1,000 mg Intravenous Q12H        fentaNYL 200 mcg/hr (06/29/23 1004)     lactated ringers Stopped (06/28/23 0011)     propofol 70 mcg/kg/min (06/29/23 1354)    And     - MEDICATION INSTRUCTIONS -       - MEDICATION INSTRUCTIONS -          ASSESSED NUTRITION NEEDS  Dose weight: 64.5 kg  Estimated Energy Needs: 5020-9325 kcals/day (20-25 kcal/kg)  Justification: Vented  Estimated Protein Needs:  grams protein/day (1.2 - 2 grams of pro/kg)  Justification: Hypercatabolism with critical illness  Estimated Fluid Needs: 1925 mL/day (30 mL/kg)   Justification: Maintenance    MALNUTRITION  As noted above    Previous Goals   Total avg nutritional intake to meet a minimum of 1171 kcal/kg and 77 g PRO/kg daily (per dosing wt 64.5 kg).  Evaluation: Met    Previous Nutrition Diagnosis  Inadequate oral intake related to acute on chronic respiratory failure as evidenced by mechanical ventilation    Evaluation: No change in patient condition, adjusted below    CURRENT NUTRITION DIAGNOSIS  Inadequate oral intake related to respiratory status necessitating NPO status as evidenced by reliance on enteral nutrition to meet needs      INTERVENTIONS  Implementation  Enteral Nutrition - Modify as above    Collaboration and Referral of Nutrition care: Patient discussed during IDT rounds this morning    Goals  Total avg nutritional intake to meet a minimum of 20 kcal/kg and 1.2 g PRO/kg daily (per dosing wt 64.5 kg).    Monitoring/Evaluation  Progress toward goals will be monitored and evaluated per protocol.    Danielle Molina, RD, LD, [facility]  Pager - 3rd floor/ICU: [phone]  Pager - All other floors: [phone]  Pager - Weekend/holiday: [phone]  Office: [phone]

## 2023-06-29 NOTE — PROGRESS NOTES
On license of [facility] ICU VENTILATOR RESPIRATORY NOTE    Date of Admission:6/20/23    Date of Intubation (most recent): 6/21/23    Reason for Mechanical Ventilation: Respiratory Failure    Number of Days on Mechanical Ventilation:9    Met Criteria for Pressure Support Trial: No    Reason for No Pressure Support Trial: Fio2 >50%    Significant Events Today:Increase in oxygen needs.    ABG Results:  Recent Labs   Lab 06/28/23  2241 06/28/23  0952 06/26/23  0351 06/25/23  0357   PH 7.35 7.31* 7.36 7.30*   PCO2 85* 99* 76* 74*   PO2 58* 88 92 100   HCO3 46* 50* 43* 36*   O2PER 60 60 50 50       ETT appearance on chest x-ray:Endotracheal  tube tip projects over the midthoracic trachea approximately 4 cm from the stevie.    Vent Mode: CMV/AC  (Continuous Mandatory Ventilation/ Assist Control)  FiO2 (%): 55 %  Resp Rate (Set): 22 breaths/min  Tidal Volume (Set, mL): 270 mL  PEEP (cm H2O): 8 cmH2O  Inspiratory Pressure (cm H2O) (Drager Norma): 45  Resp: 23    Bs diminished/expiraotyr wheezes. Increased oxygen needs t/o noc, Xopenex/Atrovent given in line.  Will continue to assess and monitor.    RT Eriberto on 6/29/2023 at 4:18 AM

## 2023-06-29 NOTE — PROGRESS NOTES
[facility]  Hospitalist Progress Note  Albin Navarro MD 06/29/23    Reason for Stay (Diagnosis): Respiratory failure, COPD exacerbation, pneumothorax         Assessment and Plan:      Summary of Stay:   Lara Melendez is a 57 year old female with PMH including COPD, emphysema, as needed 2-3 L O2 at home, tobacco dependence, hypertension, anxiety, paroxysmal atrial fibrillation on Eliquis, hypothyroidism, psoriasis, and hyperlipidemia who presented on 6/21 with worsening shortness of breath and wheezing.  Symptom onset was a few days prior to presentation and consisting of wheezing so her pulmonologist prescribed azithromycin and prednisone.  She took about 3 days of these medications.  Due to continued decline she called EMS and was brought to the ED for evaluation.     In the emergency department she had a clear COPD exacerbation so was given IV steroids, IV magnesium, multiple nebulizers and placed on BiPAP.  Imaging showed no clear evidence of pneumonia but she remained on azithromycin.  She was admitted to the ICU.     The day following admission her respiratory condition deteriorated to the point that she required intubation and mechanical ventilation.  She had significant bronchospasm and high airway pressures so required deep sedation with fentanyl, ketamine and propofol with addition of vecuronium.  She was given a continuous albuterol nebulizer with not much improvement.  Pulmonology was consulted as well.  The case was even discussed with [facility] and she is not an ECMO candidate.  Repeat x-ray 6/26 showed a left-sided pneumothorax and IR placed a chest tube, thoracic surgery was consulted.  Worsening hypernatremia with tube feeds, free water increased and D5 given with improvement.  She developed a fever to 102  F and she was started on IV clindamycin 6/26.  Persistent fevers and new leukocytosis antibiotics broadened to vancomycin and cefepime.  Sputum, blood, and urine culture sent.  She  has weaned off of vecuronium and ketamine.  Continue with IV diuresis.    Problem List/Assessment and Plan:   Acute on chronic  hypoxemic and hypercapnic respiratory failure  COPD with acute exacerbation  Left-sided pneumothorax  Possible pneumonia: At baseline she is on chronic oxygen of 2 to 3 L by nasal cannula as needed.  She has had several admissions for severe COPD requiring BiPAP, although has not previously been intubated.  Initially during this hospitalization she was on BiPAP but she quickly deteriorated so was intubated 6/21.  She developed high airway pressures and bronchospasm so required deep sedation with fentanyl, ketamine, and propofol.  Vecuronium was added due to persistent high peak pressures. Continuous albuterol nebulizer for 24 hours provided no benefit and even seem to increase bronchospasm and has been discontinued.    Chest x-ray 6/26 showed a left-sided pneumothorax likely barotrauma in setting of emphysema and high airway pressures due to severe COPD exacerbation.  Chest tube was placed by IR.  -Intensivist to manage ventilator  -Pulmonology consulted  -Currently using low tidal volume strategy given high ventilator pressures resulting in pneumothorax.  Due to this she does have increasing CO2 retention, but is for the most part compensating with a pH of 7.31, PCO2 99, and peak HCO3 of 50.  Respiratory rate mildly increased, but overall allowing a compensated respiratory acidosis.  -Continue IV methylprednisolone 40 mg twice daily   -Scheduled Xopenex and Atrovent nebs   -Finished a 5-day course of azithromycin   -Currently on IV fentanyl and propofol infusions  -Ketamine stopped 6/28  -Vecuronium stopped 6/27  -IV furosemide 20 mg twice daily  -IV clindamycin started 6/26 due to fever above 102  F.  She does have a confirmed penicillin rash.  Persistent fever and no leukocytosis.  Stop IV clindamycin and start IV vancomycin pharmacy to dose and IV cefepime 2 g every 8 hours  -Blood  cultures 6/26 NGTD.  Repeating blood culture 6/28.    -Sputum culture from 6/26 now growing GNR's  -MRSA nasal screen negative  -Hines catheter was removed and urinalysis was obtained that shows large blood, large LE, >182 WBCs and RBCs so a UTI is possible, but given the large blood it does make the pyuria difficult to interpret and we will await culture results.  Urine culture growing  K GNR's  -Thoracic surgery consulted for chest tube management   -Dr. Ovalles previously discussed the case with [facility] on she is not an ECMO candidate due to her chronic respiratory failure.    Paroxysmal atrial fibrillation: More tachycardic today which may be secondary to the fevers or alternatively ketamine.  PTA on diltiazem and Eliquis.  -Eliquis resumed  -Go back to oral diltiazem 60 mg every 6 hours, if persistent tachycardia into the 130 range then increase oral diltiazem to 90 mg or start diltiazem drip  -Avoiding beta-blockers due to severe COPD    Hyperkalemia: Potassium bola to 5.5 and persisted in that range for a day or 2.  She received several doses of Lokelma and potassium level has now normalized.  Unclear etiology.  Renal function is normal.  Her PTA losartan has been on hold.    Hypernatremia: Significant rise in sodium now up to 160 with tube feed initiation.  Improved with high-dose free water via NG and D5.  -Change free water to 60 mL every hours  -Follow sodium levels every 6 hours x2 then BMP tomorrow    Steroid-induced hyperglycemia: Continue medium sliding scale aspart.  Reasonable glycemic control at this time with most blood sugars in the 150 range.    Lactic acidosis: Likely due to nebulizers and hypoxia.     Anxiety: Holding prior to admission clonazepam.  Continue paroxetine and as needed IV lorazepam.    HTN: Initially had some soft blood pressure, but more recently hypertensive.  -Continue PTA diltiazem   -Hesitant to resume losartan with recent issues involving hyperkalemia and hesitant to  "start beta-blockers with her severe reactive airway disease.  For now scheduling hydralazine 25 mg 3 times daily and can increase this if needed.    -As needed IV hydralazine available as needed.    Tobacco dependence: Nicotine replacement.    Chronic anemia: Hemoglobin at baseline of 10-11.    DVT Prophylaxis: Eliquis  Code Status: Full Code  Lines: PICC line, arterial line, PIV, Hines catheter, NG, left-sided chest tube  FEN: Tube feeds, dietitian consulted, free water 60 mL every 4 hours  Discharge Dispo: TBD  Estimated Disch Date / # of Days until Disch: Continue ICU level cares, anticipate ongoing prolonged hospitalization    I will update spouse Luis later today    Clinically Significant Risk Factors         # Hypernatremia: Highest Na = 160 mmol/L in last 2 days, will monitor as appropriate        # Thrombocytopenia: Lowest platelets = 105 in last 2 days, will monitor for bleeding   # Hypertension: Noted on problem list        # Obesity: Estimated body mass index is 30.54 kg/m  as calculated from the following:    Height as of this encounter: 1.6 m (5' 3\").    Weight as of this encounter: 78.2 kg (172 lb 6.4 oz).                       Interval History (Subjective):      Sodium levels improved today.  Over 3 L urine output yesterday with 1 dose of Lasix in the morning.  Temperature curve improved since starting antibiotics.  Urine and sputum culture positive for GNR's.  Weaned off ketamine and is now only on fentanyl and propofol.  Morning diltiazem dose held due to lower blood pressure.                  Physical Exam:      Last Vital Signs:  /64 (BP Location: Left arm)   Pulse 112   Temp 97.7  F (36.5  C) (Bladder)   Resp 23   Ht 1.6 m (5' 3\")   Wt 78.2 kg (172 lb 6.4 oz)   SpO2 98%   BMI 30.54 kg/m      Intake/Output Summary (Last 24 hours) at 6/28/2023 1119  Last data filed at 6/28/2023 1100  Gross per 24 hour   Intake 4285.66 ml   Output 4010 ml   Net 275.66 ml       Constitutional: Intubated " and sedated  Eyes: sclera white   HEENT: ET tube  Respiratory: Right-sided rhonchi.  Slight expiratory wheeze.  Left-sided chest tube in place  Cardiovascular: Irregularly, regular rhythm, tachycardic, no murmur appreciated  GI: non-tender, not distended, bowel sounds present  Genitourinary: Hines catheter with yellow urine output  Skin: no rash    Musculoskeletal/extremities: 1-2+ bilateral lower extremity edema  Neurologic: Sedated  Psychiatric: calm          Medications:      All current medications were reviewed with changes reflected in problem list.         Data:      All new lab and imaging data were personally reviewed.   Labs:  Recent Labs   Lab 06/29/23  1232 06/29/23  1202 06/29/23  0822 06/29/23  0549 06/28/23  2357 06/28/23  2354 06/28/23  0733 06/28/23  0346 06/28/23  0001 06/27/23  2253 06/27/23  0352 06/27/23  0348   *  --   --  148*  148*  --  153*   < > 159*  159*  --  160*   < > 158*   POTASSIUM  --   --   --  4.1  --   --   --  3.9  3.9  --  4.1   < > 4.1  4.1   CHLORIDE  --   --   --  102  --   --   --  112*  --   --   --  110*   CO2  --   --   --  43*  --   --   --  45*  --   --   --  45*   ANIONGAP  --   --   --  3*  --   --   --  2*  --   --   --  3*   GLC  --  151* 149* 205*   < >  --    < > 236*   < >  --    < > 183*   BUN  --   --   --  33.9*  --   --   --  36.1*  --   --   --  41.0*   CR  --   --   --  0.51  --   --   --  0.57  --   --   --  0.62   GFRESTIMATED  --   --   --  >90  --   --   --  >90  --   --   --  >90   EDIN  --   --   --  8.4*  --   --   --  9.0  --   --   --  9.1    < > = values in this interval not displayed.     Recent Labs   Lab 06/29/23  0549 06/28/23  0346 06/27/23  0348   WBC 12.0* 13.4* 10.3   HGB 9.0* 10.2* 10.3*   HCT 30.7* 35.1 34.4*   * 115* 112*   * 138* 153        Recent Labs   Lab 06/28/23  1040 06/28/23  1001 06/26/23  2112 06/26/23  2100 06/26/23 2059   CULTURE No growth after 1 day  No growth after 1 day 50,000-100,000 CFU/mL  Gram negative bacilli* No growth after 2 days No growth after 2 days Culture in progress  1+ Normal fadi  1+ Lactose fermenting gram negative bacilli*       Imaging:   None today    Albin Navarro MD

## 2023-06-29 NOTE — PROGRESS NOTES
Critical Care  Note      06/29/2023    Name: Lara Melendez MRN#: [id number]   Age: 57 year old YOB: 1965     Hsptl Day# 8  ICU DAY # 8    MV DAY # 8             Problem List:   Principal Problem:    Paroxysmal atrial fibrillation with RVR (H)  Active Problems:    COPD exacerbation (H)    Anticoagulated    Acute and chronic respiratory failure with hypercapnia (H)           Summary/Hospital Course:   57-year-old female with history of COPD, emphysema, O2 dependent at home, tobacco dependence, hypertension, anxiety, atrial fibrillation on Eliquis, hypothyroidism presents with worsening shortness of breath and wheezing.  She appears to be in status asthmaticus versus worsening COPD exacerbation.  Patient was prescribed azithromycin and prednisone prior to admission.  She continued decline was brought to EMS for evaluation.  Where she was given IV steroids, IV magnesium nebulizers and BiPAP.  After admission to the ICU she failed a BiPAP trial and required intubation mechanical ventilation.  She had significant bronchospasm with high airway pressures requiring deep sedation with fentanyl ketamine and propofol and addition of a paralytic.  Events of last 24 hours noted for patient being weaned off ketamine.  Morning diltiazem was held for soft blood pressures.  Patient is also more awake however she does not follow commands.          Assessment and plan :     Lara Melendez IS a 57 year old female admitted on 6/20/2023 for, acute respiratory failure secondary to COPD exacerbation, small left-sided pneumothorax and history of atrial fibrillation on Eliquis..   I have personally reviewed the daily labs, imaging studies, cultures and discussed the case with referring physician and consulting physicians.     My assessment and plan by system for this patient is as follows:    Neurology/Psychiatry:   1.  Continues require deep sedation given her dyssynchrony with the vent  2 we will stop ketamine today but  continue on propofol  3.      Plan  Patient is now off paralytic, tolerating current respiratory settings.  No need for paralytic for vent synchrony.  We will now attempt to wean deep sedation, will stop ketamine today.  Continue propofol for sedation.  Would have low threshold for resuming ketamine if patient develop worsening bronchospasm    Cardiovascular:   1.Hemodynamics -normotensive to hypertensive  2.Rhythm -sinus rhythm but patient does have history of atrial fibrillation on presentation  3. Ischemia -no signs of ischemia  Plan  Patient does have a history of atrial fibrillation, appears to be in sinus tach at this time.  May be related to ketamine usage.  Continue to monitor heart rate, continue Eliquis for patient's history of atrial fibrillation.  We will continue to treat patient's hypertension with hydralazine as needed.,  Blood pressure soft, continue to monitor diltiazem held this morning    Pulmonary/Ventilator Management:   1. Airway intubated for acute ventilatory failure  2. Oxygenation/ventilation/mechanics on full mechanical ventilatory support with 50% FiO2 and 8 of PEEP.  Plan  -Continue patient on current regimen, continue patient on full mechanical ventilatory support.  Chest tube shows minimal output  Airway pressures continue to improve at this time.  Peak airway pressures are in the low 30s.  On physical examination, wheezing sounds better.  We will continue with current vent settings.  Respiratory acidosis which is chronic has been compensated with increasing her respiratory rate by 2 on the vent.  Will obtain ABG in a.m.  Otherwise continue steroids and inhalers for bronchospasm.    GI and Nutrition :   1.  Concern for protein calorie malnutrition    Plan  -Resume enteral feeds.    Renal/Fluids/Electrolytes:   1.  No acute renal injury at this time  2.  Mild hypernatremia  3.  Compensated respiratory acidosis  4.  Appears hypervolemic  Plan  -We will continue to monitor, hyperkalemia  seems to be resolving  - monitor function and electrolytes as needed with replacement per ICU protocols. - generally avoid nephrotoxic agents such as NSAID, IV contrast unless specifically required  - adjust medications as needed for renal clearance  - follow I/O's as appropriate.  -Hypernatremia seems to be resolving we will decrease free water at this time.  Would continue monitor sodium.  -We will continue Lasix diuresis      Infectious Disease:   1.  No active issues    Plan  -Continue to monitor, patient was febrile overnight, appreciate night float input with regards to starting patient on broad-spectrum antibiotics and obtaining pancultures.  We will continue the patient on broad-spectrum antibiotics while awaiting culture results.    Endocrine:   1.  Concern for stress-induced hyperglycemia  2.  Concern for diabetes induced hyperglycemia  3.  Plan  - ICU insulin protocol, goal sugar <180      Hematology/Oncology:   1.  No leukocytosis  2.  Mild anemia most likely secondary to hemodilution  3.  On Eliquis for atrial fibrillation  Plan  -Continue to monitor     IV/Access:   1. Venous access -central access  2. Arterial access -arterial line  3.  Plan  - central access required and necessary      ICU Prophylaxis:   1. DVT: On Eliquis  2. VAP: HOB 30 degrees, chlorhexidine rinse  3. Stress Ulcer: PPI/H2 blocker  4. Restraints: Nonviolent soft two point restraints required and necessary for patient safety and continued cares and good effect as patient continues to pull at necessary lines, tubes despite education and distraction. Will readdress daily.   5. Wound care  -local wound care  6. Feeding -continue tube feeds  7. Family Update: I did not update family today  8. Disposition -ICU        Key goals for next 24 hours:   1.  Continue off paralytic  2.  Stop ketamine  3.  Stop free water given that hyponatremia is corrected               Medical History:     Past Medical History:   Diagnosis Date     Anxiety       COPD (chronic obstructive pulmonary disease) - 2L home O2      Diverticulitis of colon      Hypercholesterolemia      Hypertension      Hypothyroidism      Paroxysmal atrial fibrillation      Psoriasis      Pulmonary nodule - left upper lobe      Past Surgical History:   Procedure Laterality Date     CHOLECYSTECTOMY       INCISION AND DRAINAGE MANDIBLE, COMBINED Left 01/10/2022    Procedure: INCISION AND DRAINAGE, MANDIBLE;  Surgeon: Jn Feliz DDS;  Location: [facility] OR     INCISION AND DRAINAGE MANDIBLE, COMBINED Left 02/04/2022    Procedure: INCISION AND DRAINAGE, MANDIBLE;  Surgeon: Taylor Ortez DDS;  Location: [facility] OR     TOOTH EXTRACTION       Vocal Cord surgery       Social History     Socioeconomic History     Marital status:      Spouse name: Not on file     Number of children: Not on file     Years of education: Not on file     Highest education level: Not on file   Occupational History     Not on file   Tobacco Use     Smoking status: Never     Smokeless tobacco: Never   Substance and Sexual Activity     Alcohol use: Yes     Comment: occ     Drug use: Never     Sexual activity: Not Currently   Other Topics Concern     Not on file   Social History Narrative     Not on file     Social Determinants of Health     Financial Resource Strain: Not on file   Food Insecurity: Not on file   Transportation Needs: Not on file   Physical Activity: Not on file   Stress: Not on file   Social Connections: Not on file   Intimate Partner Violence: Not on file   Housing Stability: Not on file        Allergies   Allergen Reactions     Sulfa Antibiotics      Doxycycline Other (See Comments)     Develops chest tightness  Intolerance not allergy     Penicillins Rash     Generalized rash  Rash, Generalized                Key Medications:       apixaban ANTICOAGULANT  5 mg Per Feeding Tube BID     atorvastatin  20 mg Per Feeding Tube Daily     ceFEPIme  2 g Intravenous Q8H     diltiazem  60 mg Oral or Feeding Tube  Q6H ELZBIETA     furosemide  20 mg Intravenous BID     hydrALAZINE  25 mg Oral Q8H ELZBIETA     insulin aspart  1-6 Units Subcutaneous Q4H     ipratropium  0.5 mg Nebulization 4x daily     levalbuterol  0.63 mg Nebulization 4x Daily     levothyroxine  112 mcg Per Feeding Tube Daily     methylPREDNISolone  40 mg Intravenous BID     multivitamins w/minerals  15 mL Per Feeding Tube Daily     nicotine  1 patch Transdermal Daily     nicotine   Transdermal Q8H     pantoprazole  40 mg Per Feeding Tube Daily     PARoxetine  20 mg Per Feeding Tube Daily     protein modular  1 packet Per Feeding Tube BID     sodium chloride (PF)  10-40 mL Intracatheter Q8H     sodium chloride (PF)  3 mL Intracatheter Q8H     vancomycin  1,000 mg Intravenous Q12H       fentaNYL 200 mcg/hr (06/29/23 1004)     lactated ringers Stopped (06/28/23 0011)     propofol 70 mcg/kg/min (06/29/23 1040)    And     - MEDICATION INSTRUCTIONS -       - MEDICATION INSTRUCTIONS -          Home Meds  No current facility-administered medications on file prior to encounter.  albuterol (PROAIR HFA/PROVENTIL HFA/VENTOLIN HFA) 108 (90 Base) MCG/ACT inhaler, Inhale 2 puffs into the lungs every 4 hours as needed for shortness of breath / dyspnea or wheezing Inhale 1-2 Puffs every 4 hours as needed for Wheezing.  albuterol (PROVENTIL) (2.5 MG/3ML) 0.083% neb solution, Take 1 vial (2.5 mg) by nebulization every 4 hours as needed for shortness of breath or wheezing  apixaban ANTICOAGULANT (ELIQUIS) 5 MG tablet, Take 1 tablet (5 mg) by mouth 2 times daily  atorvastatin (LIPITOR) 20 MG tablet, Take 20 mg by mouth daily  clonazePAM (KLONOPIN) 0.5 MG tablet, Take 0.5 mg by mouth daily  cyclobenzaprine (FLEXERIL) 5 MG tablet, Take 1 tablet (5 mg) by mouth every 8 hours as needed for muscle spasms  diclofenac (VOLTAREN) 1 % topical gel, Apply 2 g topically 4 times daily  diltiazem ER COATED BEADS (CARDIZEM CD/CARTIA XT) 240 MG 24 hr capsule, Take 1 capsule (240 mg) by mouth  daily  fluticasone-salmeterol (ADVAIR-HFA) 230-21 MCG/ACT inhaler, Inhale 2 puffs into the lungs 2 times daily  ipratropium - albuterol 0.5 mg/2.5 mg/3 mL (DUONEB) 0.5-2.5 (3) MG/3ML neb solution, Take 1 vial (3 mLs) by nebulization every 6 hours as needed for shortness of breath / dyspnea or wheezing  levothyroxine (SYNTHROID/LEVOTHROID) 112 MCG tablet, Take 112 mcg by mouth daily  losartan (COZAAR) 25 MG tablet, Take 1 tablet (25 mg) by mouth daily  nicotine (NICODERM CQ) 21 MG/24HR 24 hr patch, Place 1 patch onto the skin daily  PARoxetine (PAXIL) 20 MG tablet, Take 20 mg by mouth daily  predniSONE (DELTASONE) 20 MG tablet, Take 60 mg for 2 days, then 40 mg for 3 days, then 20 mg for 3 days, then 10 mg for 3 days, then stop  tiotropium (SPIRIVA RESPIMAT) 2.5 MCG/ACT inhaler, Inhale 2 puffs into the lungs daily as needed               Physical Examination:   Temp:  [97.3  F (36.3  C)-100.9  F (38.3  C)] 97.7  F (36.5  C)  Pulse:  [] 118  Resp:  [20-26] 23  BP: (124)/(64) 124/64  MAP:  [63 mmHg-119 mmHg] 68 mmHg  Arterial Line BP: (115-253)/(39-84) 121/46  FiO2 (%):  [45 %-100 %] 50 %  SpO2:  [87 %-100 %] 98 %    Intake/Output Summary (Last 24 hours) at 6/26/2023 1215  Last data filed at 6/26/2023 1200  Gross per 24 hour   Intake 2195.19 ml   Output 4555 ml   Net -2359.81 ml     Wt Readings from Last 4 Encounters:   06/29/23 78.2 kg (172 lb 6.4 oz)   04/01/23 68.8 kg (151 lb 9.6 oz)   02/11/23 60 kg (132 lb 4.4 oz)   12/02/22 61.3 kg (135 lb 2.3 oz)     Arterial Line BP: (115-253)/(39-84) 121/46  MAP:  [63 mmHg-119 mmHg] 68 mmHg  BP - Mean:  [82] 82  Vent Mode: CMV/AC  (Continuous Mandatory Ventilation/ Assist Control)  FiO2 (%): 50 %  Resp Rate (Set): 22 breaths/min  Tidal Volume (Set, mL): 270 mL  PEEP (cm H2O): 8 cmH2O  Inspiratory Pressure (cm H2O) (FeZoer Norma): 32  Resp: 23    Recent Labs   Lab 06/28/23  2241 06/28/23  0952 06/26/23  0351 06/25/23  0357   PH 7.35 7.31* 7.36 7.30*   PCO2 85* 99* 76* 74*    PO2 58* 88 92 100   HCO3 46* 50* 43* 36*   O2PER 60 60 50 50       GEN: no acute distress consistent with chemical sedation  HEENT: head ncat, sclera anicteric, OP patent, trachea midline   PULM: unlabored synchronous with vent, diffuse wheezes anteriorly but improving overall, chest tube in good position show signs of titling however no air leak  CV/COR: RRR S1S2 no gallop,  No rub, no murmur  ABD: soft nontender, hypoactive bowel sounds, no mass  EXT:  Edema   warm  NEURO: Does not follow commands, consistent with chemical sedation  SKIN: no obvious rash  LINES: clean, dry intact         Data:   All data and imaging reviewed     ROUTINE ICU LABS (Last four results)  CMP  Recent Labs   Lab 06/29/23  1202 06/29/23  0822 06/29/23  0549 06/29/23  0427 06/28/23  2357 06/28/23  2354 06/28/23  1956 06/28/23  1804 06/28/23  1550 06/28/23  1217 06/28/23  0733 06/28/23  0346 06/28/23  0001 06/27/23  2253 06/27/23  1551 06/27/23  1214 06/27/23  0352 06/27/23  0348 06/26/23  0807 06/26/23  0352 06/25/23  0358 06/25/23  0357   NA  --   --  148*  148*  --   --  153*  --  158*  --  160*  --  159*  159*  --  160*   < > 158*  --  158*  --  147*  --  139   POTASSIUM  --   --  4.1  --   --   --   --   --   --   --   --  3.9  3.9  --  4.1  --  4.4  --  4.1  4.1   < > 4.3  4.3   < > 5.6*   CHLORIDE  --   --  102  --   --   --   --   --   --   --   --  112*  --   --   --   --   --  110*  --  101  --  101   CO2  --   --  43*  --   --   --   --   --   --   --   --  45*  --   --   --   --   --  45*  --  38*  --  32*   ANIONGAP  --   --  3*  --   --   --   --   --   --   --   --  2*  --   --   --   --   --  3*  --  8  --  6*   * 149* 205* 197*   < >  --    < >  --    < >  --    < > 236*   < >  --    < >  --    < > 183*   < > 170*   < > 158*   BUN  --   --  33.9*  --   --   --   --   --   --   --   --  36.1*  --   --   --   --   --  41.0*  --  49.9*  --  48.2*   CR  --   --  0.51  --   --   --   --   --   --   --   --  0.57   --   --   --   --   --  0.62  --  0.67  --  0.87   GFRESTIMATED  --   --  >90  --   --   --   --   --   --   --   --  >90  --   --   --   --   --  >90  --  >90  --  76   EDIN  --   --  8.4*  --   --   --   --   --   --   --   --  9.0  --   --   --   --   --  9.1  --  8.8  --  8.9   MAG  --   --  2.5*  --   --   --   --   --   --   --   --  2.5*  --   --   --   --   --  2.6*  --  2.4*  --  2.6*   PHOS  --   --   --   --   --   --   --   --   --   --   --  2.7  --   --   --   --   --  3.0  --  3.8  --  4.7*    < > = values in this interval not displayed.     CBC  Recent Labs   Lab 06/29/23  0549 06/28/23  0346 06/27/23  0348 06/26/23  0352   WBC 12.0* 13.4* 10.3 8.7   RBC 2.73* 3.06* 3.06* 2.96*   HGB 9.0* 10.2* 10.3* 9.8*   HCT 30.7* 35.1 34.4* 31.9*   * 115* 112* 108*   MCH 33.0 33.3* 33.7* 33.1*   MCHC 29.3* 29.1* 29.9* 30.7*   RDW 14.2 14.3 13.8 12.9   * 138* 153 160     INR  No lab results found in last 7 days.  Arterial Blood Gas  Recent Labs   Lab 06/28/23  2241 06/28/23  0952 06/26/23 0351 06/25/23 0357   PH 7.35 7.31* 7.36 7.30*   PCO2 85* 99* 76* 74*   PO2 58* 88 92 100   HCO3 46* 50* 43* 36*   O2PER 60 60 50 50       All cultures:  No results for input(s): CULT in the last 168 hours.  Recent Results (from the past 24 hour(s))   XR Chest Port 1 View    Narrative    CHEST ONE VIEW  6/26/2023 9:29 AM     HISTORY: Increasing oxygen requirements and airway pressure.    COMPARISON: June 23, 2023      Impression    IMPRESSION: Endotracheal tube tip 4.3 cm from the stevie. Right PICC  line stable. Minimal pleural fluid or pleural thickening bilaterally  similar to previous. Small-to-moderate pneumothorax on the left new  from previous.    Results called to the patient's nurse on June 26, 2023 at 9:44 AM.     GHAZALA ORELLANA MD         SYSTEM ID:  [id number]         Billing: This patient is critically ill: yes. Total critical care time today 31 min.            Jn Madrigal MD  Critical Care  Medicine

## 2023-06-29 NOTE — PHARMACY-VANCOMYCIN DOSING SERVICE
Pharmacy Vancomycin Note  Date of Service 2023  Patient's  1965   57 year old, female    Indication: Sepsis  Day of Therapy: 2  Current vancomycin regimen:  1000 mg IV q12h  Current vancomycin monitoring method: AUC  Current vancomycin therapeutic monitoring goal: 400-600 mg*h/L    Insight[employer] Prediction of Current Vancomycin Regimen  Regimen: 1000 mg IV every 12 hours.  Start time: 23:52 on 2023  Exposure target: AUC24 (range)400-600 mg/L.hr   AUC24,ss: 380 mg/L.hr  Probability of AUC24 > 400: 40 %  Ctrough,ss: 10.8 mg/L  Probability of Ctrough,ss > 20: 3 %  Probability of nephrotoxicity (Lodise ZOHAIB ): 6 %      Current estimated CrCl = Estimated Creatinine Clearance: 117.6 mL/min (based on SCr of 0.51 mg/dL).    Creatinine for last 3 days  2023:  3:48 AM Creatinine 0.62 mg/dL  2023:  3:46 AM Creatinine 0.57 mg/dL  2023:  5:49 AM Creatinine 0.51 mg/dL    Recent Vancomycin Levels (past 3 days)  2023:  4:38 PM Vancomycin 16.4 ug/mL    Vancomycin IV Administrations (past 72 hours)                   vancomycin (VANCOCIN) 1000 mg in dextrose 5% 200 mL PREMIX (mg) 1,000 mg New Bag 23 1152     1,000 mg New Bag 23 2251    vancomycin (VANCOCIN) 1,750 mg in 0.9% NaCl 500 mL intermittent infusion (mg) 1,750 mg Given 23 1158                Nephrotoxins and other renal medications (From now, onward)    Start     Dose/Rate Route Frequency Ordered Stop    23 2300  vancomycin (VANCOCIN) 1,250 mg in 0.9% NaCl 250 mL intermittent infusion         1,250 mg  over 90 Minutes Intravenous EVERY 12 HOURS 23 1744      23 1030  furosemide (LASIX) injection 20 mg         20 mg  over 1-3 Minutes Intravenous 2 TIMES DAILY 23 1025 23 0859             Contrast Orders - past 72 hours (72h ago, onward)    None          Interpretation of levels and current regimen:  Vancomycin level is reflective of -600    Has serum creatinine changed greater than  50% in last 72 hours: No    Urine output:  good urine output    Renal Function: Stable    InsightRX Prediction of Planned New Vancomycin Regimen  Regimen: 1250 mg IV every 12 hours.  Start time: 23:52 on 06/29/2023  Exposure target: AUC24 (range)400-600 mg/L.hr   AUC24,ss: 473 mg/L.hr  Probability of AUC24 > 400: 79 %  Ctrough,ss: 13.6 mg/L  Probability of Ctrough,ss > 20: 12 %  Probability of nephrotoxicity (Lodise ZOHAIB 2009): 9 %      Plan:  1. Increase Dose to 1250 mg IV q12h  2. Vancomycin monitoring method: AUC  3. Vancomycin therapeutic monitoring goal: 400-600 mg*h/L  4. Pharmacy will check vancomycin levels as appropriate in 1-3 Days.  5. Serum creatinine levels will be ordered daily for the first week of therapy and at least twice weekly for subsequent weeks.    Junito Leal [facility]

## 2023-06-29 NOTE — PLAN OF CARE
ICU End of Shift Summary.  For vital signs and complete assessments, please see documentation flowsheets.      Pertinent assessments: Pt remains sedated to a RASS score of -4 to -5. Remains unresponsive for much of the shift but did have eyes open when dyssynchronous with the vent with increased BPs/PIPs/HR. Does not follow commands. CPOT 0-2 on Fentanyl drip. LSs remain very diminished with inspiratory/expiratory wheezes present. Suctioning small to moderate amounts of thin, cloudy sputum from ET tube. Tele SR/ST with frequent PACs and self-limiting runs of PSVT into the 170s-190s. MD yolette Hines with 1,115 mL out this shift. Weight up almost 3 kg from yesterday. 4 small, loose BMs this shift.  Major Shift Events: 2225 - Pt became dyssynchronous with the vent with PIPs in the high 50s and tidal volume unable to be delivered fully. Chest tube and lung sounds remain unchanged. FiO2 increased to 100% and Fentanyl/Prop boluses given. Propofol drip then increased for synchrony. Tele-Hub notified right after and Dr. Bae camerad in to visualize pt. He feels she looks ok with compliance at this time, but ABG ordered and results reviewed by him with no changes made. Pt repositioned off of left side and appeared much more comfortable with PIPs down into the 30s and FiO2 weaned to 55%. Turned patient from R side to supine the rest of the shift.          - 0045 - Tele-Hub RN notified of patient's drop of sodium to 153 from 158 in 6 hours. Dr. Bae placed an order to decreased D5 infusion to 75 mL/hr from 125 mL/hr. Recheck at 0600.           - 2100 - Updated daughter Danica about the events since she had left this afternoon.           - 0600 - Order to hold AM dose of Diltiazem per Dr. Bae due to MAPs in the high 60s to low 70s with AM MD to address.           - 0630 - Notified Dr. Bae of continued decreasing sodium.   Plan (Upcoming Events): Continue vent support and attempt to wean sedation as patient tolerates.  Continue antibiotics and await cultures. Monitor sodium levels closely.  Discharge/Transfer Needs: TBD. Continue ICU cares at this time.     Bedside Shift Report Completed   Bedside Safety Check Completed    Goal Outcome Evaluation:      Plan of Care Reviewed With: patient    Overall Patient Progress: no changeOverall Patient Progress: no change

## 2023-06-29 NOTE — PLAN OF CARE
Goal Outcome Evaluation:      Plan of Care Reviewed With: patient, child      ICU End of Shift Summary.  For vital signs and complete assessments, please see documentation flowsheets.     Pertinent assessments: Remains on mechanical ventilation and maintaining sats>98%. Suctioned for moderate thin secretions. Bronchodilators continued. Neurologically sedated with propofol. Fentanyl effective for pain. Ketamine gtt discontinued. Rhythm ST with freq ectopy and T wave inversion, nonsustained runs of SVT noted. MAP at goal off vasopressor. Febrile, cultures pending and ABX continued. Diuresing well via el. TF continued at goal, tolerating well. Family updated via phone.   Major Shift Events: As outline above  Plan (Upcoming Events): Trend sodium Q6hrs.   Discharge/Transfer Needs: TBD    Bedside Shift Report Completed : y   Bedside Safety Check Completed: y     CAUTI  Notified provider about indwelling el catheter discussed removal or continued need.    Did provider choose to remove indwelling el catheter? NO    Provider's el indication for keeping indwelling el catheter: Indication for continued use: Deep Sedation/Paralysis    Is there an order for indwelling el catheter? YES

## 2023-06-29 NOTE — PROGRESS NOTES
TELE:  Notified of Na 158 from 160 at noon.  Sodium is trending in the correct direction at an acceptable rate.  Continue current FW and d5 regimen    ADDENDUM:  Repeated ABG as abg from this morning showed acute respiratory acidosis.  Has now largely resolved 7.35/85.  Continue current respiratory regimen.    ADDENDUM:  Repeat sodium now 153.  Decreased d5 rate to 75/hr

## 2023-06-29 NOTE — PLAN OF CARE
Problem: Enteral Nutrition  Goal: Safe, Effective Therapy Delivery  Outcome: Progressing  Goal: Feeding Tolerance  Outcome: Progressing   Goal Outcome Evaluation: Pt tolerating TF, noted not being held for levothyroxine--adjusted rate and orders, added 3 pkts Nutrisource fiber d/t diarrhea (pt had gone several days without BM prior to onset of stooling 2 days ago). RD continues to follow closely.

## 2023-06-29 NOTE — PROGRESS NOTES
[facility] ICU VENTILATOR RESPIRATORY NOTE  Date of Admission: 6/21/2023  Date of Intubation (most recent): 6/21/2023  Reason for Mechanical Ventilation: Respiratory failure  Number of Days on Mechanical Ventilation: 9  Met Criteria for Pressure Support Trial: No  Reason for No Pressure Support Trial: Pt (although improving) continues to have significant airway obstruction resulting in high peak airway pressures, long expiratory times and air trapping.  Pt continues to need moderate amounts of sedation.  We will begin PS trial when clinically appropriate.

## 2023-06-30 ENCOUNTER — APPOINTMENT (OUTPATIENT)
Dept: GENERAL RADIOLOGY | Facility: CLINIC | Age: 58
End: 2023-06-30
Attending: ANESTHESIOLOGY
Payer: COMMERCIAL

## 2023-06-30 LAB
ALBUMIN SERPL BCG-MCNC: 3.3 G/DL (ref 3.5–5.2)
ALLEN'S TEST: NO
ALLEN'S TEST: NO
ALP SERPL-CCNC: 42 U/L (ref 35–104)
ALT SERPL W P-5'-P-CCNC: 53 U/L (ref 0–50)
ANION GAP SERPL CALCULATED.3IONS-SCNC: 3 MMOL/L (ref 7–15)
ANION GAP SERPL CALCULATED.3IONS-SCNC: 5 MMOL/L (ref 7–15)
AST SERPL W P-5'-P-CCNC: 43 U/L (ref 0–45)
BACTERIA UR CULT: ABNORMAL
BASE EXCESS BLDA CALC-SCNC: 17.8 MMOL/L (ref -9–1.8)
BASE EXCESS BLDA CALC-SCNC: 18.3 MMOL/L (ref -9–1.8)
BILIRUB SERPL-MCNC: 0.3 MG/DL
BUN SERPL-MCNC: 37.7 MG/DL (ref 8–23)
BUN SERPL-MCNC: 38.7 MG/DL (ref 8–23)
CALCIUM SERPL-MCNC: 8.6 MG/DL (ref 8.6–10)
CALCIUM SERPL-MCNC: 8.6 MG/DL (ref 8.6–10)
CHLORIDE SERPL-SCNC: 102 MMOL/L (ref 98–107)
CHLORIDE SERPL-SCNC: 103 MMOL/L (ref 98–107)
CK SERPL-CCNC: 453 U/L (ref 26–192)
CREAT SERPL-MCNC: 0.47 MG/DL (ref 0.51–0.95)
CREAT SERPL-MCNC: 0.52 MG/DL (ref 0.51–0.95)
DEPRECATED HCO3 PLAS-SCNC: 44 MMOL/L (ref 22–29)
DEPRECATED HCO3 PLAS-SCNC: 44 MMOL/L (ref 22–29)
ERYTHROCYTE [DISTWIDTH] IN BLOOD BY AUTOMATED COUNT: 13.7 % (ref 10–15)
GFR SERPL CREATININE-BSD FRML MDRD: >90 ML/MIN/1.73M2
GFR SERPL CREATININE-BSD FRML MDRD: >90 ML/MIN/1.73M2
GLUCOSE BLDC GLUCOMTR-MCNC: 166 MG/DL (ref 70–99)
GLUCOSE BLDC GLUCOMTR-MCNC: 175 MG/DL (ref 70–99)
GLUCOSE BLDC GLUCOMTR-MCNC: 185 MG/DL (ref 70–99)
GLUCOSE BLDC GLUCOMTR-MCNC: 192 MG/DL (ref 70–99)
GLUCOSE BLDC GLUCOMTR-MCNC: 192 MG/DL (ref 70–99)
GLUCOSE BLDC GLUCOMTR-MCNC: 206 MG/DL (ref 70–99)
GLUCOSE SERPL-MCNC: 173 MG/DL (ref 70–99)
GLUCOSE SERPL-MCNC: 203 MG/DL (ref 70–99)
HCO3 BLD-SCNC: 47 MMOL/L (ref 21–28)
HCO3 BLD-SCNC: 47 MMOL/L (ref 21–28)
HCT VFR BLD AUTO: 32.1 % (ref 35–47)
HGB BLD-MCNC: 9.7 G/DL (ref 11.7–15.7)
MAGNESIUM SERPL-MCNC: 2.4 MG/DL (ref 1.7–2.3)
MAGNESIUM SERPL-MCNC: 2.4 MG/DL (ref 1.7–2.3)
MAGNESIUM SERPL-MCNC: 2.9 MG/DL (ref 1.7–2.3)
MCH RBC QN AUTO: 33 PG (ref 26.5–33)
MCHC RBC AUTO-ENTMCNC: 30.2 G/DL (ref 31.5–36.5)
MCV RBC AUTO: 109 FL (ref 78–100)
O2/TOTAL GAS SETTING VFR VENT: 40 %
O2/TOTAL GAS SETTING VFR VENT: 40 %
PCO2 BLD: 85 MM HG (ref 35–45)
PCO2 BLD: 85 MM HG (ref 35–45)
PH BLD: 7.35 [PH] (ref 7.35–7.45)
PH BLD: 7.36 [PH] (ref 7.35–7.45)
PLATELET # BLD AUTO: 111 10E3/UL (ref 150–450)
PO2 BLD: 64 MM HG (ref 80–105)
PO2 BLD: 67 MM HG (ref 80–105)
POTASSIUM SERPL-SCNC: 4.4 MMOL/L (ref 3.4–5.3)
POTASSIUM SERPL-SCNC: 4.6 MMOL/L (ref 3.4–5.3)
POTASSIUM SERPL-SCNC: 4.6 MMOL/L (ref 3.4–5.3)
PROT SERPL-MCNC: 5.7 G/DL (ref 6.4–8.3)
RBC # BLD AUTO: 2.94 10E6/UL (ref 3.8–5.2)
SODIUM SERPL-SCNC: 150 MMOL/L (ref 136–145)
SODIUM SERPL-SCNC: 151 MMOL/L (ref 136–145)
TRIGL SERPL-MCNC: 76 MG/DL
TRIGL SERPL-MCNC: 97 MG/DL
WBC # BLD AUTO: 11.2 10E3/UL (ref 4–11)

## 2023-06-30 PROCEDURE — 71045 X-RAY EXAM CHEST 1 VIEW: CPT

## 2023-06-30 PROCEDURE — 999N000157 HC STATISTIC RCP TIME EA 10 MIN

## 2023-06-30 PROCEDURE — 250N000011 HC RX IP 250 OP 636: Mod: JZ | Performed by: INTERNAL MEDICINE

## 2023-06-30 PROCEDURE — 3E043XZ INTRODUCTION OF VASOPRESSOR INTO CENTRAL VEIN, PERCUTANEOUS APPROACH: ICD-10-PCS | Performed by: INTERNAL MEDICINE

## 2023-06-30 PROCEDURE — 99291 CRITICAL CARE FIRST HOUR: CPT | Performed by: ANESTHESIOLOGY

## 2023-06-30 PROCEDURE — 85027 COMPLETE CBC AUTOMATED: CPT | Performed by: INTERNAL MEDICINE

## 2023-06-30 PROCEDURE — 99233 SBSQ HOSP IP/OBS HIGH 50: CPT | Performed by: INTERNAL MEDICINE

## 2023-06-30 PROCEDURE — 80053 COMPREHEN METABOLIC PANEL: CPT | Performed by: INTERNAL MEDICINE

## 2023-06-30 PROCEDURE — 93005 ELECTROCARDIOGRAM TRACING: CPT

## 2023-06-30 PROCEDURE — 250N000011 HC RX IP 250 OP 636: Mod: JZ | Performed by: SURGERY

## 2023-06-30 PROCEDURE — 99254 IP/OBS CNSLTJ NEW/EST MOD 60: CPT | Performed by: INTERNAL MEDICINE

## 2023-06-30 PROCEDURE — 84478 ASSAY OF TRIGLYCERIDES: CPT | Performed by: ANESTHESIOLOGY

## 2023-06-30 PROCEDURE — 258N000003 HC RX IP 258 OP 636: Performed by: INTERNAL MEDICINE

## 2023-06-30 PROCEDURE — 250N000009 HC RX 250: Performed by: INTERNAL MEDICINE

## 2023-06-30 PROCEDURE — 84132 ASSAY OF SERUM POTASSIUM: CPT | Performed by: SURGERY

## 2023-06-30 PROCEDURE — 250N000009 HC RX 250: Performed by: ANESTHESIOLOGY

## 2023-06-30 PROCEDURE — 82550 ASSAY OF CK (CPK): CPT | Performed by: ANESTHESIOLOGY

## 2023-06-30 PROCEDURE — 5A2204Z RESTORATION OF CARDIAC RHYTHM, SINGLE: ICD-10-PCS | Performed by: INTERNAL MEDICINE

## 2023-06-30 PROCEDURE — 83735 ASSAY OF MAGNESIUM: CPT | Performed by: INTERNAL MEDICINE

## 2023-06-30 PROCEDURE — 250N000013 HC RX MED GY IP 250 OP 250 PS 637: Performed by: INTERNAL MEDICINE

## 2023-06-30 PROCEDURE — 200N000001 HC R&B ICU

## 2023-06-30 PROCEDURE — 94003 VENT MGMT INPAT SUBQ DAY: CPT

## 2023-06-30 PROCEDURE — 94640 AIRWAY INHALATION TREATMENT: CPT | Mod: 76

## 2023-06-30 PROCEDURE — 250N000011 HC RX IP 250 OP 636: Performed by: SURGERY

## 2023-06-30 PROCEDURE — 82803 BLOOD GASES ANY COMBINATION: CPT | Performed by: ANESTHESIOLOGY

## 2023-06-30 PROCEDURE — 94640 AIRWAY INHALATION TREATMENT: CPT

## 2023-06-30 PROCEDURE — 250N000013 HC RX MED GY IP 250 OP 250 PS 637: Performed by: HOSPITALIST

## 2023-06-30 PROCEDURE — 250N000011 HC RX IP 250 OP 636: Performed by: ANESTHESIOLOGY

## 2023-06-30 PROCEDURE — 83735 ASSAY OF MAGNESIUM: CPT | Performed by: SURGERY

## 2023-06-30 PROCEDURE — 258N000003 HC RX IP 258 OP 636: Performed by: ANESTHESIOLOGY

## 2023-06-30 RX ORDER — DILTIAZEM HYDROCHLORIDE 5 MG/ML
20 INJECTION INTRAVENOUS ONCE
Status: COMPLETED | OUTPATIENT
Start: 2023-06-30 | End: 2023-06-30

## 2023-06-30 RX ORDER — MEROPENEM 1 G/1
1 INJECTION, POWDER, FOR SOLUTION INTRAVENOUS EVERY 8 HOURS
Status: DISCONTINUED | OUTPATIENT
Start: 2023-06-30 | End: 2023-07-03

## 2023-06-30 RX ORDER — METHYLPREDNISOLONE SODIUM SUCCINATE 40 MG/ML
40 INJECTION, POWDER, LYOPHILIZED, FOR SOLUTION INTRAMUSCULAR; INTRAVENOUS EVERY 24 HOURS
Status: COMPLETED | OUTPATIENT
Start: 2023-07-01 | End: 2023-07-05

## 2023-06-30 RX ORDER — DILTIAZEM HCL/D5W 125 MG/125
5-15 PLASTIC BAG, INJECTION (ML) INTRAVENOUS CONTINUOUS
Status: DISCONTINUED | OUTPATIENT
Start: 2023-06-30 | End: 2023-06-30

## 2023-06-30 RX ORDER — MAGNESIUM SULFATE HEPTAHYDRATE 40 MG/ML
2 INJECTION, SOLUTION INTRAVENOUS ONCE
Status: COMPLETED | OUTPATIENT
Start: 2023-06-30 | End: 2023-06-30

## 2023-06-30 RX ORDER — ADENOSINE 3 MG/ML
6 INJECTION, SOLUTION INTRAVENOUS ONCE
Status: DISCONTINUED | OUTPATIENT
Start: 2023-06-30 | End: 2023-06-30

## 2023-06-30 RX ORDER — DEXMEDETOMIDINE HYDROCHLORIDE 4 UG/ML
.1-1.2 INJECTION, SOLUTION INTRAVENOUS CONTINUOUS
Status: DISCONTINUED | OUTPATIENT
Start: 2023-06-30 | End: 2023-06-30

## 2023-06-30 RX ORDER — PHENYLEPHRINE HCL IN 0.9% NACL 50MG/250ML
.1-6 PLASTIC BAG, INJECTION (ML) INTRAVENOUS CONTINUOUS
Status: DISCONTINUED | OUTPATIENT
Start: 2023-06-30 | End: 2023-07-06

## 2023-06-30 RX ORDER — FUROSEMIDE 10 MG/ML
40 INJECTION INTRAMUSCULAR; INTRAVENOUS 2 TIMES DAILY
Status: DISCONTINUED | OUTPATIENT
Start: 2023-06-30 | End: 2023-06-30

## 2023-06-30 RX ORDER — ADENOSINE 3 MG/ML
INJECTION, SOLUTION INTRAVENOUS
Status: DISCONTINUED
Start: 2023-06-30 | End: 2023-06-30 | Stop reason: HOSPADM

## 2023-06-30 RX ORDER — DILTIAZEM HYDROCHLORIDE 5 MG/ML
INJECTION INTRAVENOUS
Status: DISCONTINUED
Start: 2023-06-30 | End: 2023-06-30 | Stop reason: HOSPADM

## 2023-06-30 RX ORDER — ADENOSINE 3 MG/ML
12 INJECTION, SOLUTION INTRAVENOUS ONCE
Status: DISCONTINUED | OUTPATIENT
Start: 2023-06-30 | End: 2023-06-30

## 2023-06-30 RX ORDER — MIDAZOLAM HCL IN 0.9 % NACL/PF 1 MG/ML
1-4 PLASTIC BAG, INJECTION (ML) INTRAVENOUS CONTINUOUS
Status: DISCONTINUED | OUTPATIENT
Start: 2023-06-30 | End: 2023-07-05

## 2023-06-30 RX ORDER — PROPOFOL 10 MG/ML
5-75 INJECTION, EMULSION INTRAVENOUS CONTINUOUS
Status: DISCONTINUED | OUTPATIENT
Start: 2023-06-30 | End: 2023-07-06

## 2023-06-30 RX ORDER — DILTIAZEM HCL/D5W 125 MG/125
5 PLASTIC BAG, INJECTION (ML) INTRAVENOUS CONTINUOUS
Status: DISCONTINUED | OUTPATIENT
Start: 2023-06-30 | End: 2023-07-01

## 2023-06-30 RX ORDER — FUROSEMIDE 10 MG/ML
40 INJECTION INTRAMUSCULAR; INTRAVENOUS EVERY 12 HOURS
Status: DISCONTINUED | OUTPATIENT
Start: 2023-06-30 | End: 2023-07-01

## 2023-06-30 RX ORDER — ESMOLOL HYDROCHLORIDE 20 MG/ML
50-300 INJECTION, SOLUTION INTRAVENOUS CONTINUOUS
Status: DISCONTINUED | OUTPATIENT
Start: 2023-06-30 | End: 2023-06-30

## 2023-06-30 RX ADMIN — AMIODARONE HYDROCHLORIDE 150 MG: 1.5 INJECTION, SOLUTION INTRAVENOUS at 11:34

## 2023-06-30 RX ADMIN — PROPOFOL 40 MCG/KG/MIN: 10 INJECTION, EMULSION INTRAVENOUS at 17:59

## 2023-06-30 RX ADMIN — VECURONIUM BROMIDE 1.5 MCG/KG/MIN: 1 INJECTION, POWDER, LYOPHILIZED, FOR SOLUTION INTRAVENOUS at 22:34

## 2023-06-30 RX ADMIN — Medication 15 ML: at 08:03

## 2023-06-30 RX ADMIN — METHYLPREDNISOLONE SODIUM SUCCINATE 40 MG: 40 INJECTION INTRAMUSCULAR; INTRAVENOUS at 08:03

## 2023-06-30 RX ADMIN — IPRATROPIUM BROMIDE 0.5 MG: 0.5 SOLUTION RESPIRATORY (INHALATION) at 19:49

## 2023-06-30 RX ADMIN — Medication 200 MCG/HR: at 09:22

## 2023-06-30 RX ADMIN — MEROPENEM 1 G: 1 INJECTION, POWDER, FOR SOLUTION INTRAVENOUS at 21:37

## 2023-06-30 RX ADMIN — VANCOMYCIN HYDROCHLORIDE 1250 MG: 5 INJECTION, POWDER, LYOPHILIZED, FOR SOLUTION INTRAVENOUS at 11:37

## 2023-06-30 RX ADMIN — MINERAL OIL AND WHITE PETROLATUM: 150; 830 OINTMENT OPHTHALMIC at 14:13

## 2023-06-30 RX ADMIN — MEROPENEM 1 G: 1 INJECTION, POWDER, FOR SOLUTION INTRAVENOUS at 15:12

## 2023-06-30 RX ADMIN — DEXMEDETOMIDINE HYDROCHLORIDE 0.2 MCG/KG/HR: 400 INJECTION INTRAVENOUS at 09:43

## 2023-06-30 RX ADMIN — APIXABAN 5 MG: 5 TABLET, FILM COATED ORAL at 21:20

## 2023-06-30 RX ADMIN — Medication 10 MG/HR: at 09:51

## 2023-06-30 RX ADMIN — LEVALBUTEROL HYDROCHLORIDE 0.63 MG: 0.63 SOLUTION RESPIRATORY (INHALATION) at 07:46

## 2023-06-30 RX ADMIN — Medication 5 MG/HR: at 20:51

## 2023-06-30 RX ADMIN — PAROXETINE 20 MG: 10 SUSPENSION ORAL at 08:03

## 2023-06-30 RX ADMIN — INSULIN ASPART 2 UNITS: 100 INJECTION, SOLUTION INTRAVENOUS; SUBCUTANEOUS at 11:49

## 2023-06-30 RX ADMIN — APIXABAN 5 MG: 5 TABLET, FILM COATED ORAL at 08:03

## 2023-06-30 RX ADMIN — HYDRALAZINE HYDROCHLORIDE 25 MG: 25 TABLET, FILM COATED ORAL at 06:31

## 2023-06-30 RX ADMIN — PROPOFOL 70 MCG/KG/MIN: 10 INJECTION, EMULSION INTRAVENOUS at 03:11

## 2023-06-30 RX ADMIN — PROPOFOL 70 MCG/KG/MIN: 10 INJECTION, EMULSION INTRAVENOUS at 13:51

## 2023-06-30 RX ADMIN — LEVOTHYROXINE SODIUM 112 MCG: 0.11 TABLET ORAL at 08:03

## 2023-06-30 RX ADMIN — MIDAZOLAM HYDROCHLORIDE 8 MG/HR: 1 INJECTION, SOLUTION INTRAVENOUS at 14:08

## 2023-06-30 RX ADMIN — PROPOFOL 40 MCG/KG/MIN: 10 INJECTION, EMULSION INTRAVENOUS at 10:11

## 2023-06-30 RX ADMIN — INSULIN ASPART 2 UNITS: 100 INJECTION, SOLUTION INTRAVENOUS; SUBCUTANEOUS at 16:47

## 2023-06-30 RX ADMIN — IPRATROPIUM BROMIDE 0.5 MG: 0.5 SOLUTION RESPIRATORY (INHALATION) at 11:07

## 2023-06-30 RX ADMIN — PHENYLEPHRINE HYDROCHLORIDE 0.5 MCG/KG/MIN: 50 INJECTION INTRAVENOUS at 16:52

## 2023-06-30 RX ADMIN — IPRATROPIUM BROMIDE 0.5 MG: 0.5 SOLUTION RESPIRATORY (INHALATION) at 15:21

## 2023-06-30 RX ADMIN — FUROSEMIDE 40 MG: 10 INJECTION, SOLUTION INTRAMUSCULAR; INTRAVENOUS at 21:20

## 2023-06-30 RX ADMIN — PROPOFOL 30 MCG/KG/MIN: 10 INJECTION, EMULSION INTRAVENOUS at 22:39

## 2023-06-30 RX ADMIN — PROPOFOL 70 MCG/KG/MIN: 10 INJECTION, EMULSION INTRAVENOUS at 06:42

## 2023-06-30 RX ADMIN — MINERAL OIL AND WHITE PETROLATUM: 150; 830 OINTMENT OPHTHALMIC at 21:19

## 2023-06-30 RX ADMIN — DILTIAZEM HYDROCHLORIDE 20 MG: 5 INJECTION, SOLUTION INTRAVENOUS at 09:15

## 2023-06-30 RX ADMIN — FUROSEMIDE 40 MG: 10 INJECTION, SOLUTION INTRAMUSCULAR; INTRAVENOUS at 09:27

## 2023-06-30 RX ADMIN — INSULIN ASPART 1 UNITS: 100 INJECTION, SOLUTION INTRAVENOUS; SUBCUTANEOUS at 04:27

## 2023-06-30 RX ADMIN — Medication 1 PACKET: at 08:04

## 2023-06-30 RX ADMIN — Medication 1 PACKET: at 22:52

## 2023-06-30 RX ADMIN — LEVALBUTEROL HYDROCHLORIDE 0.63 MG: 0.63 SOLUTION RESPIRATORY (INHALATION) at 11:07

## 2023-06-30 RX ADMIN — INSULIN ASPART 2 UNITS: 100 INJECTION, SOLUTION INTRAVENOUS; SUBCUTANEOUS at 08:21

## 2023-06-30 RX ADMIN — CEFEPIME 2 G: 2 INJECTION, POWDER, FOR SOLUTION INTRAVENOUS at 09:29

## 2023-06-30 RX ADMIN — ALBUTEROL SULFATE 5 MG/HR: 2.5 SOLUTION RESPIRATORY (INHALATION) at 13:02

## 2023-06-30 RX ADMIN — MAGNESIUM SULFATE HEPTAHYDRATE 2 G: 2 INJECTION, SOLUTION INTRAVENOUS at 20:38

## 2023-06-30 RX ADMIN — INSULIN ASPART 1 UNITS: 100 INJECTION, SOLUTION INTRAVENOUS; SUBCUTANEOUS at 01:02

## 2023-06-30 RX ADMIN — DILTIAZEM HYDROCHLORIDE 60 MG: 30 TABLET, FILM COATED ORAL at 05:36

## 2023-06-30 RX ADMIN — VECURONIUM BROMIDE 0.8 MCG/KG/MIN: 1 INJECTION, POWDER, LYOPHILIZED, FOR SOLUTION INTRAVENOUS at 15:29

## 2023-06-30 RX ADMIN — Medication 40 MG: at 08:03

## 2023-06-30 RX ADMIN — INSULIN ASPART 1 UNITS: 100 INJECTION, SOLUTION INTRAVENOUS; SUBCUTANEOUS at 21:17

## 2023-06-30 RX ADMIN — AMIODARONE HYDROCHLORIDE 150 MG: 1.5 INJECTION, SOLUTION INTRAVENOUS at 15:10

## 2023-06-30 RX ADMIN — CEFEPIME 2 G: 2 INJECTION, POWDER, FOR SOLUTION INTRAVENOUS at 02:21

## 2023-06-30 RX ADMIN — PROPOFOL 70 MCG/KG/MIN: 10 INJECTION, EMULSION INTRAVENOUS at 00:00

## 2023-06-30 RX ADMIN — ATORVASTATIN CALCIUM 20 MG: 20 TABLET, FILM COATED ORAL at 08:03

## 2023-06-30 RX ADMIN — NICOTINE 1 PATCH: 14 PATCH, EXTENDED RELEASE TRANSDERMAL at 08:16

## 2023-06-30 RX ADMIN — Medication 200 MCG/HR: at 21:33

## 2023-06-30 RX ADMIN — AMIODARONE HYDROCHLORIDE 1 MG/MIN: 50 INJECTION, SOLUTION INTRAVENOUS at 11:46

## 2023-06-30 RX ADMIN — IPRATROPIUM BROMIDE 0.5 MG: 0.5 SOLUTION RESPIRATORY (INHALATION) at 07:46

## 2023-06-30 RX ADMIN — Medication 1 PACKET: at 15:33

## 2023-06-30 ASSESSMENT — ACTIVITIES OF DAILY LIVING (ADL)
ADLS_ACUITY_SCORE: 36
ADLS_ACUITY_SCORE: 32
ADLS_ACUITY_SCORE: 36

## 2023-06-30 NOTE — PROGRESS NOTES
Formerly [facility], later [facility] ICU VENTILATOR RESPIRATORY NOTE    Date of Admission: 6/20/23    Date of Intubation (most recent): 6/21/23    Reason for Mechanical Ventilation: Respiratory failure    Number of Days on Mechanical Ventilation: 10    Met Criteria for Pressure Support Trial: no    Reason for No Pressure Support Trial: Airway unstable    Significant Events Today: Continuous albuterol nebulizer started this afternoon    ABG Results:   Recent Labs   Lab 06/30/23  0933 06/30/23  0820 06/28/23  2241 06/28/23  0952   PH 7.35 7.36 7.35 7.31*   PCO2 85* 85* 85* 99*   PO2 64* 67* 58* 88   HCO3 47* 47* 46* 50*   O2PER 40 40 60 60       ETT appearance on chest x-ray: 5.5 cm above stevie    Plan:  Patient remains on ventilator with settings of:  Vent Mode: CMV/AC  (Continuous Mandatory Ventilation/ Assist Control)  FiO2 (%): 60 %  Resp Rate (Set): 16 breaths/min  Tidal Volume (Set, mL): 270 mL  PEEP (cm H2O): 8 cmH2O  Resp: 22

## 2023-06-30 NOTE — PLAN OF CARE
ICU End of Shift Summary.  For vital signs and complete assessments, please see documentation flowsheets.      Pertinent assessments: Pt remains on full vent support with a RASS score of -4 throughout shift. Able to open eyes whenever she is turned but not following commands. CPOT 0. T max of 99.9 but resolved on own. LSs diminished throughout. Needed increased O2 heart rate BP up but back down to 40% by AM. Suctioning small amounts of thin, cloudy secretions from ET tube. Pale yellow oral and nasal secretions lessened but still present. Tele SR/ST/SA with frequent PACs and self-limiting runs of PSVT. Hines with 2,180 mL out this shift. Weight up about 1 kg from yesterday despite diuresis. One loose BM tonight.  Major Shift Events: None  Plan (Upcoming Events): Continue vent support and wean as able. Wean sedation as pt tolerates. Antibiotics, Lasix, and bronchodilator continue. Monitor sodium levels.  Discharge/Transfer Needs: TBD. Continue ICU cares at this time.     Bedside Shift Report Completed   Bedside Safety Check Completed    Goal Outcome Evaluation:      Plan of Care Reviewed With: patient    Overall Patient Progress: improvingOverall Patient Progress: improving

## 2023-06-30 NOTE — H&P (VIEW-ONLY)
[facility]    CARDIOLOGY CONSULT    Date of Admission:  6/20/2023  Date of Consult: June 30, 2023    ASSESSMENT:  57-year-old female with severe COPD is seen for paroxysmal rapid A-fib.  She went back into A-fib today with very rapid rates.  Cardioversion was successfully performed after 1 shock at 200 J.  This resulted in sinus rhythm with a rate of 115.  She has been started on amiodarone drip.  She is on chronic anticoagulation.    Will be high risk for going back into the rapid A-fib.  Would plan for diltiazem drip and adding digoxin for rate control.  Hopefully with more amiodarone loading, she may spontaneously convert or stay in sinus rhythm.    RECOMMENDATIONS:  1.  Paroxysmal rapid A-fib  -Continue amiodarone drip with 24-hour IV load, could give additional 24-hour load if she goes back into A-fib, plan to transition to oral  - If recurrent A-fib try diltiazem drip, alternatively could try esmolol drip  -Probably would need digoxin if she has recurrent A-fib  -Continue anticoagulation    Angel Yeh MD  Cardiology - [facility]  Pager:  [phone]  Text Page  June 30, 2023    CODE STATUS:  Full Code    REASON FOR CONSULT: Rapid A-fib    PRIMARY CARE PHYSICIAN:  Sudhir Carroll    HISTORY OF PRESENT ILLNESS:  57 year old female seen for rapid A-fib.  She has tobacco dependence, oxygen dependent COPD, hypertension, paroxysmal A-fib, hypothyroid.    Echo March 2023 showed EF 70%, no valve disease.    She was admitted June 6 with worsening dyspnea.  She has been treated for COPD exacerbation.  Her condition worsened requiring intubation.  She had a left pneumothorax requiring chest tube placement.  She has been on tube feeds.  She is spiked a fever and started on antibiotics.    She has been in sinus rhythm with a heart rate in the low 100s.  This morning she went into a rapid A-fib with heart rate between 130 and 170.  She has been on chronic anticoagulation.  Blood pressure is  stable, she is not requiring pressors.  She is very sedated and currently vent dependent.    PAST MEDICAL HISTORY:  I have reviewed this patient's medical history and updated it with pertinent information if needed.   Past Medical History:   Diagnosis Date     Anxiety      COPD (chronic obstructive pulmonary disease) - 2L home O2      Diverticulitis of colon      Hypercholesterolemia      Hypertension      Hypothyroidism      Paroxysmal atrial fibrillation      Psoriasis      Pulmonary nodule - left upper lobe        PAST SURGICAL HISTORY:  I have reviewed this patient's surgical history and updated it with pertinent information if needed.  Past Surgical History:   Procedure Laterality Date     CHOLECYSTECTOMY       INCISION AND DRAINAGE MANDIBLE, COMBINED Left 01/10/2022    Procedure: INCISION AND DRAINAGE, MANDIBLE;  Surgeon: Jn Feliz DDS;  Location: [facility] OR     INCISION AND DRAINAGE MANDIBLE, COMBINED Left 02/04/2022    Procedure: INCISION AND DRAINAGE, MANDIBLE;  Surgeon: Taylor Ortez DDS;  Location: [facility] OR     TOOTH EXTRACTION       Vocal Cord surgery         HOME MEDICATIONS:  Prior to Admission Medications   Prescriptions Last Dose Informant Patient Reported? Taking?   PARoxetine (PAXIL) 20 MG tablet   Yes No   Sig: Take 20 mg by mouth daily   albuterol (PROAIR HFA/PROVENTIL HFA/VENTOLIN HFA) 108 (90 Base) MCG/ACT inhaler   No No   Sig: Inhale 2 puffs into the lungs every 4 hours as needed for shortness of breath / dyspnea or wheezing Inhale 1-2 Puffs every 4 hours as needed for Wheezing.   albuterol (PROVENTIL) (2.5 MG/3ML) 0.083% neb solution   No No   Sig: Take 1 vial (2.5 mg) by nebulization every 4 hours as needed for shortness of breath or wheezing   apixaban ANTICOAGULANT (ELIQUIS) 5 MG tablet   No No   Sig: Take 1 tablet (5 mg) by mouth 2 times daily   atorvastatin (LIPITOR) 20 MG tablet  Self Yes No   Sig: Take 20 mg by mouth daily   clonazePAM (KLONOPIN) 0.5 MG tablet   Yes No   Sig: Take  0.5 mg by mouth daily   cyclobenzaprine (FLEXERIL) 5 MG tablet   No No   Sig: Take 1 tablet (5 mg) by mouth every 8 hours as needed for muscle spasms   diclofenac (VOLTAREN) 1 % topical gel   No No   Sig: Apply 2 g topically 4 times daily   diltiazem ER COATED BEADS (CARDIZEM CD/CARTIA XT) 240 MG 24 hr capsule   No No   Sig: Take 1 capsule (240 mg) by mouth daily   fluticasone-salmeterol (ADVAIR-HFA) 230-21 MCG/ACT inhaler   No No   Sig: Inhale 2 puffs into the lungs 2 times daily   ipratropium - albuterol 0.5 mg/2.5 mg/3 mL (DUONEB) 0.5-2.5 (3) MG/3ML neb solution   No No   Sig: Take 1 vial (3 mLs) by nebulization every 6 hours as needed for shortness of breath / dyspnea or wheezing   levothyroxine (SYNTHROID/LEVOTHROID) 112 MCG tablet  Self Yes No   Sig: Take 112 mcg by mouth daily   losartan (COZAAR) 25 MG tablet   No No   Sig: Take 1 tablet (25 mg) by mouth daily   nicotine (NICODERM CQ) 21 MG/24HR 24 hr patch   No No   Sig: Place 1 patch onto the skin daily   predniSONE (DELTASONE) 20 MG tablet   No No   Sig: Take 60 mg for 2 days, then 40 mg for 3 days, then 20 mg for 3 days, then 10 mg for 3 days, then stop   tiotropium (SPIRIVA RESPIMAT) 2.5 MCG/ACT inhaler   Yes No   Sig: Inhale 2 puffs into the lungs daily as needed      Facility-Administered Medications: None       ALLERGIES:  Allergies   Allergen Reactions     Sulfa Antibiotics      Doxycycline Other (See Comments)     Develops chest tightness  Intolerance not allergy     Penicillins Rash     Generalized rash  Rash, Generalized         SOCIAL HISTORY:  I have reviewed this patient's social history and updated it with pertinent information if needed. Lara KELLI Melendez  reports that she has never smoked. She has never used smokeless tobacco. She reports current alcohol use. She reports that she does not use drugs.    FAMILY HISTORY:  I have reviewed this patient's family history and updated it with pertinent information if needed.   Family History   Problem  "Relation Age of Onset     Deep Vein Thrombosis (DVT) Mother      Hypertension Mother        REVIEW OF SYSTEMS:  Unable to obtain, patient is intubated and sedated    PHYSICAL EXAM:  Temp: 100.2  F (37.9  C) Temp src: Bladder BP: 139/84 Pulse: (!) 134   Resp: 22 SpO2: 97 % O2 Device: Mechanical Ventilator    Vital Signs with Ranges  Temp:  [98.1  F (36.7  C)-100.2  F (37.9  C)] 100.2  F (37.9  C)  Pulse:  [] 134  Resp:  [22-24] 22  BP: (139)/(84) 139/84  MAP:  [65 mmHg-110 mmHg] 70 mmHg  Arterial Line BP: (112-182)/(39-74) 112/50  FiO2 (%):  [40 %-100 %] 40 %  SpO2:  [90 %-100 %] 97 %  174 lbs 6.14 oz    Constitutional: Intubated and sedated  Eyes: PERRL, sclera nonicteric  ENT: trachea midline  Respiratory: Vent sounds  Cardiovascular: Tachycardic, irregular rhythm  GI: nondistended, nontender, bowel sounds present  Lymph/Hematologic: no lymphadenopathy  Skin: dry, no rash    Intake/Output Summary (Last 24 hours) at 6/30/2023 1400  Last data filed at 6/30/2023 1200  Gross per 24 hour   Intake 3069.14 ml   Output 3855 ml   Net -785.86 ml     Clinically Significant Risk Factors         # Hypernatremia: Highest Na = 158 mmol/L in last 2 days, will monitor as appropriate  # Hypocalcemia: Lowest Ca = 8.4 mg/dL in last 2 days, will monitor and replace as appropriate     # Hypoalbuminemia: Lowest albumin = 3.3 g/dL at 6/30/2023  1:27 PM, will monitor as appropriate   # Thrombocytopenia: Lowest platelets = 105 in last 2 days, will monitor for bleeding   # Hypertension: Noted on problem list        # Obesity: Estimated body mass index is 30.89 kg/m  as calculated from the following:    Height as of this encounter: 1.6 m (5' 3\").    Weight as of this encounter: 79.1 kg (174 lb 6.1 oz).          Cardiac Arrhythmia: Atrial fibrillation: Paroxysmal    COPD      DATA:  Labs: Sodium 151, creatinine 0.5, total CK4 150, WBC 11, hemoglobin 9.7, platelets 111  Recent Labs   Lab Test 06/30/23  0505 06/29/23  1638   TRIG 97 76 "     EKG: June 30: A-fib, rate 149, nonspecific ST abnormalities    Tele: Baseline rhythm sinus, rate , at 9 AM this morning she converted to rapid A-fib, heart rate 130-170

## 2023-06-30 NOTE — PROGRESS NOTES
[facility] ICU VENTILATOR RESPIRATORY NOTE     Date of Admission:6/20/23     Date of Intubation (most recent): 6/21/23     Reason for Mechanical Ventilation: Respiratory Failure     Number of Days on Mechanical Ventilation:10     Met Criteria for Pressure Support Trial: No     Reason for No Pressure Support Trial: Unstable/Unsafe airway        ABG Results:  Recent Labs   Lab 06/28/23  2241 06/28/23  0952 06/26/23  0351 06/25/23  0357   PH 7.35 7.31* 7.36 7.30*   PCO2 85* 99* 76* 74*   PO2 58* 88 92 100   HCO3 46* 50* 43* 36*   O2PER 60 60 50 50     Vent Mode: CMV/AC  (Continuous Mandatory Ventilation/ Assist Control)  FiO2 (%): 45 %  Resp Rate (Set): 22 breaths/min  Tidal Volume (Set, mL): 270 mL  PEEP (cm H2O): 8 cmH2O  Resp: 24    Bs diminished. Suctioned for small cloudy thin secretions. Will continue to assess and monitor.    RT Eriberto on 6/30/2023 at 4:23 AM

## 2023-06-30 NOTE — PROVIDER NOTIFICATION
06/30/23 0820   Critical Test Results/Notification   Critical Lab Result (Lab Name and Value) ABG PCO2 85, Bicarb 47   What Time Did The Lab Notify You? 0815   Provider Notified yes   Date of Provider Notification 06/30/23   Time of Provider Notification 0818   Mechanism of Provider notification verbal (face-to-face)   What Provider Did You Notify? Madrigal   Response other (see comment)  (new vent orders)

## 2023-06-30 NOTE — PLAN OF CARE
Goal Outcome Evaluation:      Plan of Care Reviewed With: patient, spouse, child    Overall Patient Progress: decliningOverall Patient Progress: declining                ICU End of Shift Summary.  For vital signs and complete assessments, please see documentation flowsheets.      Pertinent assessments:   Neuro:sedated  and paralyzed on propofol, versed and Fentanyl and Vecuronium , RASS -5,  CPOT 0,   Cardiac: tele; afib/ CVC/ RVR. 110-120's. This am patient sustained AFIB? RVR/  SVT RUNS MD notified, EKG completed, Cardizem push given, Cardizem gtt started with no changes, amiodarone bolus given, the gtt started with no changes, Cards consulted, patient was cardioverted per cards, another amiodarone bolus given. Jarad started this evening due to low BP.  Resp: on full vent support, FIO2 60%, RR 16,  and peep of 8. Moderate secretions suctioned through ETT, Chest Tube WDL  GI: abdomen distended, obese, no stool, TF at goal  : Hines WDL, good UOP  Skin: no changes  Lines: PICC, PIV, ART line,  Drips: vecuronium, Versed,  Phenylphrine, amiodarone, Prop, and Fentanyl     Major Shift Events:     Lasix one time dose given    Blood gas completed, vent changes made    Precedex started    Cardizem push and gtt started    Amiodarone bolus and gtt started, Cardizem gtt discontinued    Continuous albuterol neb started    Cards consulted, patient was cardioverted at bedside per MD ortiz at 1420    Precedex stopped, Cardizem gtt reordered but on hold now, restart if patient rhythm back in afib/RVR    Vancomycin discontinued    Meropenem started    Chest Xray completed    Propofol stopped, versed added    Propofol restarted to achieve BIS goal before started vecuronium    Vecuronium started    Jarad gtt added for low BP  Plan (Upcoming Events): continue vent management, wean as able, continue IV abx, follow up labs and Cx  Discharge/Transfer Needs: TBD     Bedside Shift Report Completed : yes  Bedside Safety Check  Completed: yes    Notified provider about indwelling el catheter discussed removal or continued need.    Did provider choose to remove indwelling el catheter? no    Provider's el indication for keeping indwelling el catheter: Indication for continued use: Strict 1-2 Hour I & O if external catheters are not an option    Is there an order for indwelling el catheter? YES    *If there is a plan to keep el catheter in place at discharge daily notification with provider is not necessary, but please add a notation in the treatment team sticky note that the patient will be discharging with the catheter.

## 2023-06-30 NOTE — CONSULTS
[facility]    CARDIOLOGY CONSULT    Date of Admission:  6/20/2023  Date of Consult: June 30, 2023    ASSESSMENT:  57-year-old female with severe COPD is seen for paroxysmal rapid A-fib.  She went back into A-fib today with very rapid rates.  Cardioversion was successfully performed after 1 shock at 200 J.  This resulted in sinus rhythm with a rate of 115.  She has been started on amiodarone drip.  She is on chronic anticoagulation.    Will be high risk for going back into the rapid A-fib.  Would plan for diltiazem drip and adding digoxin for rate control.  Hopefully with more amiodarone loading, she may spontaneously convert or stay in sinus rhythm.    RECOMMENDATIONS:  1.  Paroxysmal rapid A-fib  -Continue amiodarone drip with 24-hour IV load, could give additional 24-hour load if she goes back into A-fib, plan to transition to oral  - If recurrent A-fib try diltiazem drip, alternatively could try esmolol drip  -Probably would need digoxin if she has recurrent A-fib  -Continue anticoagulation    Angel Yeh MD  Cardiology - [facility]  Pager:  [phone]  Text Page  June 30, 2023    CODE STATUS:  Full Code    REASON FOR CONSULT: Rapid A-fib    PRIMARY CARE PHYSICIAN:  Sudhir Carroll    HISTORY OF PRESENT ILLNESS:  57 year old female seen for rapid A-fib.  She has tobacco dependence, oxygen dependent COPD, hypertension, paroxysmal A-fib, hypothyroid.    Echo March 2023 showed EF 70%, no valve disease.    She was admitted June 6 with worsening dyspnea.  She has been treated for COPD exacerbation.  Her condition worsened requiring intubation.  She had a left pneumothorax requiring chest tube placement.  She has been on tube feeds.  She is spiked a fever and started on antibiotics.    She has been in sinus rhythm with a heart rate in the low 100s.  This morning she went into a rapid A-fib with heart rate between 130 and 170.  She has been on chronic anticoagulation.  Blood pressure is  stable, she is not requiring pressors.  She is very sedated and currently vent dependent.    PAST MEDICAL HISTORY:  I have reviewed this patient's medical history and updated it with pertinent information if needed.   Past Medical History:   Diagnosis Date     Anxiety      COPD (chronic obstructive pulmonary disease) - 2L home O2      Diverticulitis of colon      Hypercholesterolemia      Hypertension      Hypothyroidism      Paroxysmal atrial fibrillation      Psoriasis      Pulmonary nodule - left upper lobe        PAST SURGICAL HISTORY:  I have reviewed this patient's surgical history and updated it with pertinent information if needed.  Past Surgical History:   Procedure Laterality Date     CHOLECYSTECTOMY       INCISION AND DRAINAGE MANDIBLE, COMBINED Left 01/10/2022    Procedure: INCISION AND DRAINAGE, MANDIBLE;  Surgeon: Jn Feliz DDS;  Location: [facility] OR     INCISION AND DRAINAGE MANDIBLE, COMBINED Left 02/04/2022    Procedure: INCISION AND DRAINAGE, MANDIBLE;  Surgeon: Taylor Ortez DDS;  Location: [facility] OR     TOOTH EXTRACTION       Vocal Cord surgery         HOME MEDICATIONS:  Prior to Admission Medications   Prescriptions Last Dose Informant Patient Reported? Taking?   PARoxetine (PAXIL) 20 MG tablet   Yes No   Sig: Take 20 mg by mouth daily   albuterol (PROAIR HFA/PROVENTIL HFA/VENTOLIN HFA) 108 (90 Base) MCG/ACT inhaler   No No   Sig: Inhale 2 puffs into the lungs every 4 hours as needed for shortness of breath / dyspnea or wheezing Inhale 1-2 Puffs every 4 hours as needed for Wheezing.   albuterol (PROVENTIL) (2.5 MG/3ML) 0.083% neb solution   No No   Sig: Take 1 vial (2.5 mg) by nebulization every 4 hours as needed for shortness of breath or wheezing   apixaban ANTICOAGULANT (ELIQUIS) 5 MG tablet   No No   Sig: Take 1 tablet (5 mg) by mouth 2 times daily   atorvastatin (LIPITOR) 20 MG tablet  Self Yes No   Sig: Take 20 mg by mouth daily   clonazePAM (KLONOPIN) 0.5 MG tablet   Yes No   Sig: Take  0.5 mg by mouth daily   cyclobenzaprine (FLEXERIL) 5 MG tablet   No No   Sig: Take 1 tablet (5 mg) by mouth every 8 hours as needed for muscle spasms   diclofenac (VOLTAREN) 1 % topical gel   No No   Sig: Apply 2 g topically 4 times daily   diltiazem ER COATED BEADS (CARDIZEM CD/CARTIA XT) 240 MG 24 hr capsule   No No   Sig: Take 1 capsule (240 mg) by mouth daily   fluticasone-salmeterol (ADVAIR-HFA) 230-21 MCG/ACT inhaler   No No   Sig: Inhale 2 puffs into the lungs 2 times daily   ipratropium - albuterol 0.5 mg/2.5 mg/3 mL (DUONEB) 0.5-2.5 (3) MG/3ML neb solution   No No   Sig: Take 1 vial (3 mLs) by nebulization every 6 hours as needed for shortness of breath / dyspnea or wheezing   levothyroxine (SYNTHROID/LEVOTHROID) 112 MCG tablet  Self Yes No   Sig: Take 112 mcg by mouth daily   losartan (COZAAR) 25 MG tablet   No No   Sig: Take 1 tablet (25 mg) by mouth daily   nicotine (NICODERM CQ) 21 MG/24HR 24 hr patch   No No   Sig: Place 1 patch onto the skin daily   predniSONE (DELTASONE) 20 MG tablet   No No   Sig: Take 60 mg for 2 days, then 40 mg for 3 days, then 20 mg for 3 days, then 10 mg for 3 days, then stop   tiotropium (SPIRIVA RESPIMAT) 2.5 MCG/ACT inhaler   Yes No   Sig: Inhale 2 puffs into the lungs daily as needed      Facility-Administered Medications: None       ALLERGIES:  Allergies   Allergen Reactions     Sulfa Antibiotics      Doxycycline Other (See Comments)     Develops chest tightness  Intolerance not allergy     Penicillins Rash     Generalized rash  Rash, Generalized         SOCIAL HISTORY:  I have reviewed this patient's social history and updated it with pertinent information if needed. Lara KELLI Melendez  reports that she has never smoked. She has never used smokeless tobacco. She reports current alcohol use. She reports that she does not use drugs.    FAMILY HISTORY:  I have reviewed this patient's family history and updated it with pertinent information if needed.   Family History   Problem  "Relation Age of Onset     Deep Vein Thrombosis (DVT) Mother      Hypertension Mother        REVIEW OF SYSTEMS:  Unable to obtain, patient is intubated and sedated    PHYSICAL EXAM:  Temp: 100.2  F (37.9  C) Temp src: Bladder BP: 139/84 Pulse: (!) 134   Resp: 22 SpO2: 97 % O2 Device: Mechanical Ventilator    Vital Signs with Ranges  Temp:  [98.1  F (36.7  C)-100.2  F (37.9  C)] 100.2  F (37.9  C)  Pulse:  [] 134  Resp:  [22-24] 22  BP: (139)/(84) 139/84  MAP:  [65 mmHg-110 mmHg] 70 mmHg  Arterial Line BP: (112-182)/(39-74) 112/50  FiO2 (%):  [40 %-100 %] 40 %  SpO2:  [90 %-100 %] 97 %  174 lbs 6.14 oz    Constitutional: Intubated and sedated  Eyes: PERRL, sclera nonicteric  ENT: trachea midline  Respiratory: Vent sounds  Cardiovascular: Tachycardic, irregular rhythm  GI: nondistended, nontender, bowel sounds present  Lymph/Hematologic: no lymphadenopathy  Skin: dry, no rash    Intake/Output Summary (Last 24 hours) at 6/30/2023 1400  Last data filed at 6/30/2023 1200  Gross per 24 hour   Intake 3069.14 ml   Output 3855 ml   Net -785.86 ml     Clinically Significant Risk Factors         # Hypernatremia: Highest Na = 158 mmol/L in last 2 days, will monitor as appropriate  # Hypocalcemia: Lowest Ca = 8.4 mg/dL in last 2 days, will monitor and replace as appropriate     # Hypoalbuminemia: Lowest albumin = 3.3 g/dL at 6/30/2023  1:27 PM, will monitor as appropriate   # Thrombocytopenia: Lowest platelets = 105 in last 2 days, will monitor for bleeding   # Hypertension: Noted on problem list        # Obesity: Estimated body mass index is 30.89 kg/m  as calculated from the following:    Height as of this encounter: 1.6 m (5' 3\").    Weight as of this encounter: 79.1 kg (174 lb 6.1 oz).          Cardiac Arrhythmia: Atrial fibrillation: Paroxysmal    COPD      DATA:  Labs: Sodium 151, creatinine 0.5, total CK4 150, WBC 11, hemoglobin 9.7, platelets 111  Recent Labs   Lab Test 06/30/23  0505 06/29/23  1638   TRIG 97 76 "     EKG: June 30: A-fib, rate 149, nonspecific ST abnormalities    Tele: Baseline rhythm sinus, rate , at 9 AM this morning she converted to rapid A-fib, heart rate 130-170

## 2023-06-30 NOTE — PROGRESS NOTES
Critical Care  Note      06/30/2023    Name: Lara Melendez MRN#: [id number]   Age: 57 year old YOB: 1965     Hsptl Day# 9  ICU DAY # 9    MV DAY # 9             Problem List:   Principal Problem:    Paroxysmal atrial fibrillation with RVR (H)  Active Problems:    COPD exacerbation (H)    Anticoagulated    Acute and chronic respiratory failure with hypercapnia (H)           Summary/Hospital Course:   57-year-old female with history of COPD, emphysema, O2 dependent at home, tobacco dependence, hypertension, anxiety, atrial fibrillation on Eliquis, hypothyroidism presents with worsening shortness of breath and wheezing.  She appears to be in status asthmaticus versus worsening COPD exacerbation.  Patient was prescribed azithromycin and prednisone prior to admission.  She continued decline was brought to EMS for evaluation.  Where she was given IV steroids, IV magnesium nebulizers and BiPAP.  After admission to the ICU she failed a BiPAP trial and required intubation mechanical ventilation.  She had significant bronchospasm with high airway pressures requiring deep sedation with fentanyl ketamine and propofol and addition of a paralytic.  Events of last 24 hours noted for patient remaining off paralytic and ketamine.  However this morning the patient did develop multiple arrhythmias including SVT and atrial fibrillation with RVR.  This was not amenable to treatment with diltiazem.  Patient was started on amiodarone.  Initially her airway pressures were in the 20s and the vent was adjusted in order to improve her exhalation time.  Her arterial blood gases did not change showing a compensated respiratory acidosis.  However airway pressures did trend upward over the course of the morning and given the fact they are back in the 50s the patient was put back on her original vent settings and a continuous albuterol inhaler was started.        Assessment and plan :     Lara Melendez IS a 57 year old female  admitted on 6/20/2023 for, acute respiratory failure secondary to COPD exacerbation, small left-sided pneumothorax and history of atrial fibrillation on Eliquis..   I have personally reviewed the daily labs, imaging studies, cultures and discussed the case with referring physician and consulting physicians.     My assessment and plan by system for this patient is as follows:    Neurology/Psychiatry:   1.  Continues require deep sedation given her dyssynchrony with the vent  2 we will stop ketamine today but continue on propofol  3.      Plan initially patient airway pressures were in the mid 20s off paralytic and ketamine.  Given the patient's new onset SVT which transitioned into A-fib with RVR which the patient has a history of we added Precedex for sedation.  We will try to avoid going back on paralytic and ketamine.  Continue propofol as tolerated.    Cardiovascular:   1.Hemodynamics -normotensive to hypertensive  2.Rhythm -atrial fibrillation with RVR  3. Ischemia -no signs of ischemia  Plan  Patient does have an atrial fibrillation this morning she went into an SVT which quickly developed into an atrial fibrillation with RVR.  This is not amenable to management with diltiazem.  Patient was not on amiodarone this morning.  We will continue to follow the heart rate at this time.    Pulmonary/Ventilator Management:   1. Airway intubated for acute ventilatory failure  2. Oxygenation/ventilation/mechanics on full mechanical ventilatory support with 50% FiO2 and 8 of PEEP.  Plan  -Continue patient on current regimen, continue patient on full mechanical ventilatory support.  Chest tube shows minimal output  Initially this morning airway pressures of the mid 20s.  I adjusted the patient's ventilator to allow more exhalation time.  Arterial blood gases essentially remain unchanged.  Over the course of the morning the patient developed airway pressures in the 50s, we will repeat chest x-ray at this time.  We returned  the patient to her original vent settings from this AM.  I ordered a continuous albuterol neb.  We will try to keep the patient from having to be reparalyzed.    GI and Nutrition :   1.  Concern for protein calorie malnutrition    Plan  -Resume enteral feeds.    Renal/Fluids/Electrolytes:   1.  No acute renal injury at this time  2.  Mild hypernatremia  3.  Compensated respiratory acidosis no significant change with vent manipulation  4.  Appears hypervolemic  Plan  -We will continue to monitor, hyperkalemia seems to be resolving  - monitor function and electrolytes as needed with replacement per ICU protocols. - generally avoid nephrotoxic agents such as NSAID, IV contrast unless specifically required  - adjust medications as needed for renal clearance  - follow I/O's as appropriate.  -Hypernatremia seems to be resolving we will decrease free water at this time.  Would continue monitor sodium.  -We will continue Lasix diuresis      Infectious Disease:   1.  Concern for UTI and positive sputum culture.    Plan  -Continue patient on cefepime while awaiting antibiotic sensitivities.  We will stop vancomycin at this time.    Endocrine:   1.  Concern for stress-induced hyperglycemia  2.  Concern for diabetes induced hyperglycemia  3.  Plan  - ICU insulin protocol, goal sugar <180      Hematology/Oncology:   1.  No leukocytosis  2.  Mild anemia most likely secondary to hemodilution  3.  On Eliquis for atrial fibrillation  Plan  -Continue to monitor     IV/Access:   1. Venous access -central access  2. Arterial access -arterial line  3.  Plan  - central access required and necessary      ICU Prophylaxis:   1. DVT: On Eliquis  2. VAP: HOB 30 degrees, chlorhexidine rinse  3. Stress Ulcer: PPI/H2 blocker  4. Restraints: Nonviolent soft two point restraints required and necessary for patient safety and continued cares and good effect as patient continues to pull at necessary lines, tubes despite education and distraction.  Will readdress daily.   5. Wound care  -local wound care  6. Feeding -continue tube feeds  7. Family Update: I did not update family today  8. Disposition -ICU        Key goals for next 24 hours:   1.  Tidal volume strategy  2.  Continuous nebulizer  3.  Repeat chest x-ray               Medical History:     Past Medical History:   Diagnosis Date     Anxiety      COPD (chronic obstructive pulmonary disease) - 2L home O2      Diverticulitis of colon      Hypercholesterolemia      Hypertension      Hypothyroidism      Paroxysmal atrial fibrillation      Psoriasis      Pulmonary nodule - left upper lobe      Past Surgical History:   Procedure Laterality Date     CHOLECYSTECTOMY       INCISION AND DRAINAGE MANDIBLE, COMBINED Left 01/10/2022    Procedure: INCISION AND DRAINAGE, MANDIBLE;  Surgeon: Jn Feliz DDS;  Location: [facility] OR     INCISION AND DRAINAGE MANDIBLE, COMBINED Left 02/04/2022    Procedure: INCISION AND DRAINAGE, MANDIBLE;  Surgeon: Taylor Ortez DDS;  Location: [facility] OR     TOOTH EXTRACTION       Vocal Cord surgery       Social History     Socioeconomic History     Marital status:      Spouse name: Not on file     Number of children: Not on file     Years of education: Not on file     Highest education level: Not on file   Occupational History     Not on file   Tobacco Use     Smoking status: Never     Smokeless tobacco: Never   Substance and Sexual Activity     Alcohol use: Yes     Comment: occ     Drug use: Never     Sexual activity: Not Currently   Other Topics Concern     Not on file   Social History Narrative     Not on file     Social Determinants of Health     Financial Resource Strain: Not on file   Food Insecurity: Not on file   Transportation Needs: Not on file   Physical Activity: Not on file   Stress: Not on file   Social Connections: Not on file   Intimate Partner Violence: Not on file   Housing Stability: Not on file        Allergies   Allergen Reactions     Sulfa Antibiotics       Doxycycline Other (See Comments)     Develops chest tightness  Intolerance not allergy     Penicillins Rash     Generalized rash  Rash, Generalized                Key Medications:       adenosine         adenosine         amiodarone  150 mg Intravenous Once     apixaban ANTICOAGULANT  5 mg Per Feeding Tube BID     atorvastatin  20 mg Per Feeding Tube Daily     ceFEPIme  2 g Intravenous Q8H     diltiazem         [Held by provider] diltiazem  60 mg Oral or Feeding Tube Q6H [facility]     fiber modular (NUTRISOURCE FIBER)  1 packet Per Feeding Tube TID     furosemide  40 mg Intravenous Q12H     hydrALAZINE  25 mg Oral Q8H [facility]     insulin aspart  1-6 Units Subcutaneous Q4H     ipratropium  0.5 mg Nebulization 4x daily     levalbuterol  0.63 mg Nebulization 4x Daily     levothyroxine  112 mcg Per Feeding Tube Daily     [START ON 7/1/2023] methylPREDNISolone  40 mg Intravenous Q24H     multivitamins w/minerals  15 mL Per Feeding Tube Daily     nicotine  1 patch Transdermal Daily     nicotine   Transdermal Q8H     pantoprazole  40 mg Per Feeding Tube Daily     PARoxetine  20 mg Per Feeding Tube Daily     protein modular  1 packet Per Feeding Tube BID     sodium chloride (PF)  10-40 mL Intracatheter Q8H     sodium chloride (PF)  3 mL Intracatheter Q8H     vancomycin  1,250 mg Intravenous Q12H       albuterol       amiodarone 1 mg/min (06/30/23 1146)     amiodarone       dexmedetomidine 0.7 mcg/kg/hr (06/30/23 1045)     fentaNYL 200 mcg/hr (06/30/23 1000)     lactated ringers Stopped (06/28/23 0011)     propofol 40 mcg/kg/min (06/30/23 1015)    And     - MEDICATION INSTRUCTIONS -       - MEDICATION INSTRUCTIONS -          Home Meds  No current facility-administered medications on file prior to encounter.  albuterol (PROAIR HFA/PROVENTIL HFA/VENTOLIN HFA) 108 (90 Base) MCG/ACT inhaler, Inhale 2 puffs into the lungs every 4 hours as needed for shortness of breath / dyspnea or wheezing Inhale 1-2 Puffs every 4 hours as needed for  Wheezing.  albuterol (PROVENTIL) (2.5 MG/3ML) 0.083% neb solution, Take 1 vial (2.5 mg) by nebulization every 4 hours as needed for shortness of breath or wheezing  apixaban ANTICOAGULANT (ELIQUIS) 5 MG tablet, Take 1 tablet (5 mg) by mouth 2 times daily  atorvastatin (LIPITOR) 20 MG tablet, Take 20 mg by mouth daily  clonazePAM (KLONOPIN) 0.5 MG tablet, Take 0.5 mg by mouth daily  cyclobenzaprine (FLEXERIL) 5 MG tablet, Take 1 tablet (5 mg) by mouth every 8 hours as needed for muscle spasms  diclofenac (VOLTAREN) 1 % topical gel, Apply 2 g topically 4 times daily  diltiazem ER COATED BEADS (CARDIZEM CD/CARTIA XT) 240 MG 24 hr capsule, Take 1 capsule (240 mg) by mouth daily  fluticasone-salmeterol (ADVAIR-HFA) 230-21 MCG/ACT inhaler, Inhale 2 puffs into the lungs 2 times daily  ipratropium - albuterol 0.5 mg/2.5 mg/3 mL (DUONEB) 0.5-2.5 (3) MG/3ML neb solution, Take 1 vial (3 mLs) by nebulization every 6 hours as needed for shortness of breath / dyspnea or wheezing  levothyroxine (SYNTHROID/LEVOTHROID) 112 MCG tablet, Take 112 mcg by mouth daily  losartan (COZAAR) 25 MG tablet, Take 1 tablet (25 mg) by mouth daily  nicotine (NICODERM CQ) 21 MG/24HR 24 hr patch, Place 1 patch onto the skin daily  PARoxetine (PAXIL) 20 MG tablet, Take 20 mg by mouth daily  predniSONE (DELTASONE) 20 MG tablet, Take 60 mg for 2 days, then 40 mg for 3 days, then 20 mg for 3 days, then 10 mg for 3 days, then stop  tiotropium (SPIRIVA RESPIMAT) 2.5 MCG/ACT inhaler, Inhale 2 puffs into the lungs daily as needed               Physical Examination:   Temp:  [98.1  F (36.7  C)-100  F (37.8  C)] 100  F (37.8  C)  Pulse:  [] 147  Resp:  [22-24] 22  BP: (139)/(84) 139/84  MAP:  [65 mmHg-110 mmHg] 86 mmHg  Arterial Line BP: (116-182)/(39-74) 131/67  FiO2 (%):  [40 %-100 %] 40 %  SpO2:  [91 %-100 %] 98 %    Intake/Output Summary (Last 24 hours) at 6/26/2023 1215  Last data filed at 6/26/2023 1200  Gross per 24 hour   Intake 2195.19 ml    Output 4555 ml   Net -2359.81 ml     Wt Readings from Last 4 Encounters:   06/30/23 79.1 kg (174 lb 6.1 oz)   04/01/23 68.8 kg (151 lb 9.6 oz)   02/11/23 60 kg (132 lb 4.4 oz)   12/02/22 61.3 kg (135 lb 2.3 oz)     Arterial Line BP: (116-182)/(39-74) 131/67  MAP:  [65 mmHg-110 mmHg] 86 mmHg  BP - Mean:  [103] 103  Vent Mode: CMV/AC  (Continuous Mandatory Ventilation/ Assist Control)  FiO2 (%): 40 %  Resp Rate (Set): 16 breaths/min  Tidal Volume (Set, mL): 270 mL  PEEP (cm H2O): 8 cmH2O  Resp: 22    Recent Labs   Lab 06/30/23  0933 06/30/23  0820 06/28/23  2241 06/28/23  0952   PH 7.35 7.36 7.35 7.31*   PCO2 85* 85* 85* 99*   PO2 64* 67* 58* 88   HCO3 47* 47* 46* 50*   O2PER 40 40 60 60       GEN: no acute distress consistent with chemical sedation  HEENT: head ncat, sclera anicteric, OP patent, trachea midline   PULM: unlabored synchronous with vent, diffuse wheezes anteriorly but improving overall however wheezes increased this morning, chest tube in good position show signs of titling however no air leak  CV/COR: RRR S1S2 no gallop,  No rub, no murmur  ABD: soft nontender, hypoactive bowel sounds, no mass  EXT:  Edema   warm  NEURO: Does not follow commands, consistent with chemical sedation  SKIN: no obvious rash  LINES: clean, dry intact         Data:   All data and imaging reviewed     ROUTINE ICU LABS (Last four results)  CMP  Recent Labs   Lab 06/30/23  0820 06/30/23  0505 06/30/23  0423 06/30/23  0025 06/29/23 2006 06/29/23  1758 06/29/23  1611 06/29/23  1232 06/29/23  0822 06/29/23  0549 06/28/23  0733 06/28/23  0346 06/28/23  0001 06/27/23  2253 06/27/23  0352 06/27/23  0348 06/26/23  0807 06/26/23  0352 06/25/23  0358 06/25/23  0357   NA  --  150*  --   --   --  149*  --  149*  --  148*  148*   < > 159*  159*  --  160*   < > 158*  --  147*  --  139   POTASSIUM  --  4.6  --   --   --   --   --   --   --  4.1  --  3.9  3.9  --  4.1   < > 4.1  4.1   < > 4.3  4.3   < > 5.6*   CHLORIDE  --  103  --    --   --   --   --   --   --  102  --  112*  --   --   --  110*  --  101  --  101   CO2  --  44*  --   --   --   --   --   --   --  43*  --  45*  --   --   --  45*  --  38*  --  32*   ANIONGAP  --  3*  --   --   --   --   --   --   --  3*  --  2*  --   --   --  3*  --  8  --  6*   * 173* 175* 185*   < >  --    < >  --    < > 205*   < > 236*   < >  --    < > 183*   < > 170*   < > 158*   BUN  --  37.7*  --   --   --   --   --   --   --  33.9*  --  36.1*  --   --   --  41.0*  --  49.9*  --  48.2*   CR  --  0.47*  --   --   --   --   --   --   --  0.51  --  0.57  --   --   --  0.62  --  0.67  --  0.87   GFRESTIMATED  --  >90  --   --   --   --   --   --   --  >90  --  >90  --   --   --  >90  --  >90  --  76   EDIN  --  8.6  --   --   --   --   --   --   --  8.4*  --  9.0  --   --   --  9.1  --  8.8  --  8.9   MAG  --  2.4*  --   --   --   --   --   --   --  2.5*  --  2.5*  --   --   --  2.6*  --  2.4*  --  2.6*   PHOS  --   --   --   --   --   --   --   --   --   --   --  2.7  --   --   --  3.0  --  3.8  --  4.7*    < > = values in this interval not displayed.     CBC  Recent Labs   Lab 06/30/23  0505 06/29/23  0549 06/28/23  0346 06/27/23  0348   WBC 11.2* 12.0* 13.4* 10.3   RBC 2.94* 2.73* 3.06* 3.06*   HGB 9.7* 9.0* 10.2* 10.3*   HCT 32.1* 30.7* 35.1 34.4*   * 113* 115* 112*   MCH 33.0 33.0 33.3* 33.7*   MCHC 30.2* 29.3* 29.1* 29.9*   RDW 13.7 14.2 14.3 13.8   * 105* 138* 153     INR  No lab results found in last 7 days.  Arterial Blood Gas  Recent Labs   Lab 06/30/23  0933 06/30/23  0820 06/28/23  2241 06/28/23  0952   PH 7.35 7.36 7.35 7.31*   PCO2 85* 85* 85* 99*   PO2 64* 67* 58* 88   HCO3 47* 47* 46* 50*   O2PER 40 40 60 60       All cultures:  No results for input(s): CULT in the last 168 hours.  Recent Results (from the past 24 hour(s))   XR Chest Port 1 View    Narrative    CHEST ONE VIEW  6/26/2023 9:29 AM     HISTORY: Increasing oxygen requirements and airway pressure.    COMPARISON: June  23, 2023      Impression    IMPRESSION: Endotracheal tube tip 4.3 cm from the stevie. Right PICC  line stable. Minimal pleural fluid or pleural thickening bilaterally  similar to previous. Small-to-moderate pneumothorax on the left new  from previous.    Results called to the patient's nurse on June 26, 2023 at 9:44 AM.     GHAZALA ORELLANA MD         SYSTEM ID:  [id number]         Billing: This patient is critically ill: yes. Total critical care time today40 min.            Jn Madrigal MD  Critical Care Medicine    Due to continued issues this p.m.  Patient even with amiodarone bolus and drip remains with heart rate up to 170s.  We will attempt to add Cardizem as a second agent for better rate control.  Also will obtain a cardiology consult to inquire whether or not this patient would benefit from a cardioversion.  Patient's airway pressures are now back into the 50s transiently.  Chest x-ray was reviewed which shows no further pneumothorax with chest tube in good position.  Given difficulty in ventilating this patient will reparalyzed patient with vecuronium.  Use bis monitor to maintain sedation and range of 40-50.      Pressure soft with increased propofol, will switch to Versed

## 2023-07-01 LAB
ALLEN'S TEST: NO
ANION GAP SERPL CALCULATED.3IONS-SCNC: 2 MMOL/L (ref 7–15)
BACTERIA SPT CULT: ABNORMAL
BACTERIA SPT CULT: ABNORMAL
BASE EXCESS BLDA CALC-SCNC: 17.1 MMOL/L (ref -9–1.8)
BUN SERPL-MCNC: 41.9 MG/DL (ref 8–23)
CALCIUM SERPL-MCNC: 7.5 MG/DL (ref 8.6–10)
CHLORIDE SERPL-SCNC: 108 MMOL/L (ref 98–107)
CREAT SERPL-MCNC: 0.57 MG/DL (ref 0.51–0.95)
DEPRECATED HCO3 PLAS-SCNC: 41 MMOL/L (ref 22–29)
ERYTHROCYTE [DISTWIDTH] IN BLOOD BY AUTOMATED COUNT: 14.2 % (ref 10–15)
GFR SERPL CREATININE-BSD FRML MDRD: >90 ML/MIN/1.73M2
GLUCOSE BLDC GLUCOMTR-MCNC: 122 MG/DL (ref 70–99)
GLUCOSE BLDC GLUCOMTR-MCNC: 131 MG/DL (ref 70–99)
GLUCOSE BLDC GLUCOMTR-MCNC: 139 MG/DL (ref 70–99)
GLUCOSE BLDC GLUCOMTR-MCNC: 151 MG/DL (ref 70–99)
GLUCOSE BLDC GLUCOMTR-MCNC: 155 MG/DL (ref 70–99)
GLUCOSE BLDC GLUCOMTR-MCNC: 183 MG/DL (ref 70–99)
GLUCOSE BLDC GLUCOMTR-MCNC: 208 MG/DL (ref 70–99)
GLUCOSE SERPL-MCNC: 133 MG/DL (ref 70–99)
GRAM STAIN RESULT: ABNORMAL
GRAM STAIN RESULT: ABNORMAL
HCO3 BLD-SCNC: 47 MMOL/L (ref 21–28)
HCT VFR BLD AUTO: 25.7 % (ref 35–47)
HGB BLD-MCNC: 7.7 G/DL (ref 11.7–15.7)
HGB BLD-MCNC: 8.8 G/DL (ref 11.7–15.7)
MAGNESIUM SERPL-MCNC: 2.5 MG/DL (ref 1.7–2.3)
MCH RBC QN AUTO: 33.3 PG (ref 26.5–33)
MCHC RBC AUTO-ENTMCNC: 30 G/DL (ref 31.5–36.5)
MCV RBC AUTO: 111 FL (ref 78–100)
O2/TOTAL GAS SETTING VFR VENT: 70 %
PCO2 BLD: 103 MM HG (ref 35–45)
PH BLD: 7.27 [PH] (ref 7.35–7.45)
PHOSPHATE SERPL-MCNC: 3.6 MG/DL (ref 2.5–4.5)
PLATELET # BLD AUTO: 84 10E3/UL (ref 150–450)
PO2 BLD: 197 MM HG (ref 80–105)
POTASSIUM SERPL-SCNC: 3.5 MMOL/L (ref 3.4–5.3)
POTASSIUM SERPL-SCNC: 4.2 MMOL/L (ref 3.4–5.3)
POTASSIUM SERPL-SCNC: 4.3 MMOL/L (ref 3.4–5.3)
RBC # BLD AUTO: 2.31 10E6/UL (ref 3.8–5.2)
SODIUM SERPL-SCNC: 151 MMOL/L (ref 136–145)
WBC # BLD AUTO: 10.2 10E3/UL (ref 4–11)

## 2023-07-01 PROCEDURE — 258N000003 HC RX IP 258 OP 636: Performed by: ANESTHESIOLOGY

## 2023-07-01 PROCEDURE — 200N000001 HC R&B ICU

## 2023-07-01 PROCEDURE — 258N000003 HC RX IP 258 OP 636: Performed by: INTERNAL MEDICINE

## 2023-07-01 PROCEDURE — 250N000009 HC RX 250: Performed by: ANESTHESIOLOGY

## 2023-07-01 PROCEDURE — 83735 ASSAY OF MAGNESIUM: CPT | Performed by: INTERNAL MEDICINE

## 2023-07-01 PROCEDURE — 84132 ASSAY OF SERUM POTASSIUM: CPT | Performed by: INTERNAL MEDICINE

## 2023-07-01 PROCEDURE — 99291 CRITICAL CARE FIRST HOUR: CPT | Performed by: ANESTHESIOLOGY

## 2023-07-01 PROCEDURE — 94003 VENT MGMT INPAT SUBQ DAY: CPT

## 2023-07-01 PROCEDURE — 94640 AIRWAY INHALATION TREATMENT: CPT | Mod: 76

## 2023-07-01 PROCEDURE — 250N000011 HC RX IP 250 OP 636: Performed by: SURGERY

## 2023-07-01 PROCEDURE — 250N000011 HC RX IP 250 OP 636: Mod: JZ | Performed by: SURGERY

## 2023-07-01 PROCEDURE — 80048 BASIC METABOLIC PNL TOTAL CA: CPT | Performed by: INTERNAL MEDICINE

## 2023-07-01 PROCEDURE — 85027 COMPLETE CBC AUTOMATED: CPT | Performed by: INTERNAL MEDICINE

## 2023-07-01 PROCEDURE — 250N000011 HC RX IP 250 OP 636: Performed by: INTERNAL MEDICINE

## 2023-07-01 PROCEDURE — 250N000013 HC RX MED GY IP 250 OP 250 PS 637: Performed by: INTERNAL MEDICINE

## 2023-07-01 PROCEDURE — 99233 SBSQ HOSP IP/OBS HIGH 50: CPT | Performed by: INTERNAL MEDICINE

## 2023-07-01 PROCEDURE — 84100 ASSAY OF PHOSPHORUS: CPT | Performed by: INTERNAL MEDICINE

## 2023-07-01 PROCEDURE — 250N000009 HC RX 250: Performed by: INTERNAL MEDICINE

## 2023-07-01 PROCEDURE — 250N000011 HC RX IP 250 OP 636: Mod: JZ | Performed by: INTERNAL MEDICINE

## 2023-07-01 PROCEDURE — 85018 HEMOGLOBIN: CPT | Performed by: INTERNAL MEDICINE

## 2023-07-01 PROCEDURE — 999N000157 HC STATISTIC RCP TIME EA 10 MIN

## 2023-07-01 PROCEDURE — 99232 SBSQ HOSP IP/OBS MODERATE 35: CPT | Performed by: INTERNAL MEDICINE

## 2023-07-01 PROCEDURE — 82803 BLOOD GASES ANY COMBINATION: CPT | Performed by: ANESTHESIOLOGY

## 2023-07-01 PROCEDURE — 250N000011 HC RX IP 250 OP 636: Performed by: ANESTHESIOLOGY

## 2023-07-01 PROCEDURE — 250N000013 HC RX MED GY IP 250 OP 250 PS 637: Performed by: HOSPITALIST

## 2023-07-01 RX ORDER — POTASSIUM CHLORIDE 29.8 MG/ML
20 INJECTION INTRAVENOUS ONCE
Status: COMPLETED | OUTPATIENT
Start: 2023-07-01 | End: 2023-07-01

## 2023-07-01 RX ORDER — DEXTROSE MONOHYDRATE 50 MG/ML
INJECTION, SOLUTION INTRAVENOUS CONTINUOUS
Status: DISCONTINUED | OUTPATIENT
Start: 2023-07-01 | End: 2023-07-03

## 2023-07-01 RX ADMIN — MIDAZOLAM HYDROCHLORIDE 12 MG/HR: 1 INJECTION, SOLUTION INTRAVENOUS at 19:07

## 2023-07-01 RX ADMIN — PROPOFOL 20 MCG/KG/MIN: 10 INJECTION, EMULSION INTRAVENOUS at 12:38

## 2023-07-01 RX ADMIN — ATORVASTATIN CALCIUM 20 MG: 20 TABLET, FILM COATED ORAL at 08:04

## 2023-07-01 RX ADMIN — AMIODARONE HYDROCHLORIDE 0.5 MG/MIN: 50 INJECTION, SOLUTION INTRAVENOUS at 08:18

## 2023-07-01 RX ADMIN — MIDAZOLAM HYDROCHLORIDE 8 MG/HR: 1 INJECTION, SOLUTION INTRAVENOUS at 00:19

## 2023-07-01 RX ADMIN — INSULIN ASPART 2 UNITS: 100 INJECTION, SOLUTION INTRAVENOUS; SUBCUTANEOUS at 16:23

## 2023-07-01 RX ADMIN — IPRATROPIUM BROMIDE 0.5 MG: 0.5 SOLUTION RESPIRATORY (INHALATION) at 16:11

## 2023-07-01 RX ADMIN — VECURONIUM BROMIDE 1.5 MCG/KG/MIN: 1 INJECTION, POWDER, LYOPHILIZED, FOR SOLUTION INTRAVENOUS at 17:54

## 2023-07-01 RX ADMIN — PROPOFOL 20 MCG/KG/MIN: 10 INJECTION, EMULSION INTRAVENOUS at 18:41

## 2023-07-01 RX ADMIN — METHYLPREDNISOLONE SODIUM SUCCINATE 40 MG: 40 INJECTION, POWDER, FOR SOLUTION INTRAMUSCULAR; INTRAVENOUS at 08:00

## 2023-07-01 RX ADMIN — PAROXETINE 20 MG: 10 SUSPENSION ORAL at 08:14

## 2023-07-01 RX ADMIN — Medication 40 MG: at 08:14

## 2023-07-01 RX ADMIN — Medication 200 MCG/HR: at 09:32

## 2023-07-01 RX ADMIN — MEROPENEM 1 G: 1 INJECTION, POWDER, FOR SOLUTION INTRAVENOUS at 13:58

## 2023-07-01 RX ADMIN — INSULIN ASPART 1 UNITS: 100 INJECTION, SOLUTION INTRAVENOUS; SUBCUTANEOUS at 20:27

## 2023-07-01 RX ADMIN — Medication 15 ML: at 08:04

## 2023-07-01 RX ADMIN — INSULIN ASPART 1 UNITS: 100 INJECTION, SOLUTION INTRAVENOUS; SUBCUTANEOUS at 04:39

## 2023-07-01 RX ADMIN — MIDAZOLAM HYDROCHLORIDE 8 MG/HR: 1 INJECTION, SOLUTION INTRAVENOUS at 10:35

## 2023-07-01 RX ADMIN — MINERAL OIL AND WHITE PETROLATUM: 150; 830 OINTMENT OPHTHALMIC at 13:58

## 2023-07-01 RX ADMIN — APIXABAN 5 MG: 5 TABLET, FILM COATED ORAL at 20:28

## 2023-07-01 RX ADMIN — IPRATROPIUM BROMIDE 0.5 MG: 0.5 SOLUTION RESPIRATORY (INHALATION) at 08:40

## 2023-07-01 RX ADMIN — IPRATROPIUM BROMIDE 0.5 MG: 0.5 SOLUTION RESPIRATORY (INHALATION) at 19:53

## 2023-07-01 RX ADMIN — INSULIN ASPART 1 UNITS: 100 INJECTION, SOLUTION INTRAVENOUS; SUBCUTANEOUS at 12:38

## 2023-07-01 RX ADMIN — NICOTINE 1 PATCH: 14 PATCH, EXTENDED RELEASE TRANSDERMAL at 08:03

## 2023-07-01 RX ADMIN — MINERAL OIL AND WHITE PETROLATUM: 150; 830 OINTMENT OPHTHALMIC at 05:57

## 2023-07-01 RX ADMIN — PROPOFOL 30 MCG/KG/MIN: 10 INJECTION, EMULSION INTRAVENOUS at 04:34

## 2023-07-01 RX ADMIN — AMIODARONE HYDROCHLORIDE 0.25 MG/MIN: 50 INJECTION, SOLUTION INTRAVENOUS at 10:46

## 2023-07-01 RX ADMIN — LEVALBUTEROL HYDROCHLORIDE 0.63 MG: 0.63 SOLUTION RESPIRATORY (INHALATION) at 19:53

## 2023-07-01 RX ADMIN — MINERAL OIL AND WHITE PETROLATUM: 150; 830 OINTMENT OPHTHALMIC at 22:04

## 2023-07-01 RX ADMIN — ALBUTEROL SULFATE 5 MG/HR: 2.5 SOLUTION RESPIRATORY (INHALATION) at 00:19

## 2023-07-01 RX ADMIN — DEXTROSE MONOHYDRATE: 50 INJECTION, SOLUTION INTRAVENOUS at 08:54

## 2023-07-01 RX ADMIN — Medication 1 PACKET: at 08:14

## 2023-07-01 RX ADMIN — Medication 200 MCG/HR: at 20:42

## 2023-07-01 RX ADMIN — POTASSIUM CHLORIDE 20 MEQ: 29.8 INJECTION, SOLUTION INTRAVENOUS at 07:03

## 2023-07-01 RX ADMIN — FUROSEMIDE 40 MG: 10 INJECTION, SOLUTION INTRAMUSCULAR; INTRAVENOUS at 08:01

## 2023-07-01 RX ADMIN — FUROSEMIDE 3 MG/HR: 10 INJECTION, SOLUTION INTRAMUSCULAR; INTRAVENOUS at 14:07

## 2023-07-01 RX ADMIN — APIXABAN 5 MG: 5 TABLET, FILM COATED ORAL at 08:04

## 2023-07-01 RX ADMIN — VECURONIUM BROMIDE 1.4 MCG/KG/MIN: 1 INJECTION, POWDER, LYOPHILIZED, FOR SOLUTION INTRAVENOUS at 06:18

## 2023-07-01 RX ADMIN — IPRATROPIUM BROMIDE 0.5 MG: 0.5 SOLUTION RESPIRATORY (INHALATION) at 11:16

## 2023-07-01 RX ADMIN — PHENYLEPHRINE HYDROCHLORIDE 0.5 MCG/KG/MIN: 50 INJECTION INTRAVENOUS at 06:54

## 2023-07-01 RX ADMIN — MEROPENEM 1 G: 1 INJECTION, POWDER, FOR SOLUTION INTRAVENOUS at 05:57

## 2023-07-01 RX ADMIN — LEVOTHYROXINE SODIUM 112 MCG: 0.11 TABLET ORAL at 08:54

## 2023-07-01 RX ADMIN — MEROPENEM 1 G: 1 INJECTION, POWDER, FOR SOLUTION INTRAVENOUS at 21:59

## 2023-07-01 RX ADMIN — PHENYLEPHRINE HYDROCHLORIDE 1 MCG/KG/MIN: 50 INJECTION INTRAVENOUS at 17:55

## 2023-07-01 ASSESSMENT — ACTIVITIES OF DAILY LIVING (ADL)
ADLS_ACUITY_SCORE: 36

## 2023-07-01 NOTE — PROGRESS NOTES
[facility] ICU VENTILATOR RESPIRATORY NOTE    Date of Admission: 6/20/23    Date of Intubation (most recent): 6/21/23    Reason for Mechanical Ventilation: Respiratory failure    Number of Days on Mechanical Ventilation: 10    Met Criteria for Pressure Support Trial: no    Reason for No Pressure Support Trial: Airway unstable    Significant Events Today: Tidal volume increased to 310. Continuous albuterol nebulizer stopped today    ABG Results:   Recent Labs   Lab 07/01/23  0808 06/30/23  0933 06/30/23  0820 06/28/23  2241   PH 7.27* 7.35 7.36 7.35   PCO2 103* 85* 85* 85*   PO2 197* 64* 67* 58*   HCO3 47* 47* 47* 46*   O2PER 70 40 40 60       ETT appearance on chest x-ray: 5.5 cm above stevie    Plan:  Patient remains on ventilator with settings of:  Vent Mode: CMV/AC  (Continuous Mandatory Ventilation/ Assist Control)  FiO2 (%): 50 %  Resp Rate (Set): 16 breaths/min  Tidal Volume (Set, mL): 310 mL  PEEP (cm H2O): 5 cmH2O  Resp: 10

## 2023-07-01 NOTE — PROGRESS NOTES
ICU End of Shift Summary.  For vital signs and complete assessments, please see documentation flowsheets.      Pertinent assessments:  CV: ST/SR w/ frequent PAC's, PVC's, & multiple runs of a-fib RVR?/SVT that were non-sustained. 12-lead EKG completed, 2 grams of Mag Sulfate given, electrolytes checked. Dilt drip added. Jarad currently at 0.5 to keep MAP >65 and Amio infusion still running.   Neuro: RASS -5, Vec, Twitch goal 1/4. Versed, Prop, and Fentanyl.   Pulm: Vent, FiO2 between 60-80%,  continuous nebulizer  Renal: Hines, scheduled lasix with good UO response   GI: TF @ 35 (goal), free water flushes increased to 200 mL q4h, 1 BM this shift  Skin: No changes   ID: afebrile this shift  Endo: q4h blood sugar checks with sliding scale    Discharge/Transfer Needs: Pending clinical status     Bedside Shift Report Completed : Yes   Bedside Safety Check Completed: Yes

## 2023-07-01 NOTE — PROVIDER NOTIFICATION
[facility] called @ 2000 and Lakisha NEVES was spoken to regarding patient's HR. Patient is having multiple runs of a-fib RVR/SVT that are non-sustained. Able to add Cardizem drip? Will continue to monitor.     _________________  Addendum: 12-lead EKG completed, K and Mag lab check at 2355, Cardizem drip (non-titratable) started @ 5 mg/hr. 2 grams of IV Mag Sulfate, and free water flushes increased to 200 mL q4h. Will continue to monitor.

## 2023-07-01 NOTE — PLAN OF CARE
Goal Outcome Evaluation:       Plan of Care Reviewed With: patient, spouse, adult kids     Overall Patient Progress: decliningOverall Patient Progress: declining        ICU End of Shift Summary.  For vital signs and complete assessments, please see documentation flowsheets.      Pertinent assessments:   Neuro:sedated  and paralyzed on propofol, versed and Fentanyl and Vecuronium , RASS -5,  CPOT 0,   Cardiac: tele; ST with frequent PACs.-120's. few unsustained afib/ RVR and SVT runs that last for few seconds at the time, MD aware. Amiodarone infusing, Jarad also infusing to maintain Map above 65  Resp: on full vent support, FIO2 50%, RR 16,  and peep of 5 , small secretions suctioned through ETT, Chest Tube WDL  GI: abdomen distended, obese, one stool today TF at goal  : El WDL, good UOP  Skin: no changes  Lines: PICC, PIV, ART line,  Drips: vecuronium, Versed,  Phenylphrine, amiodarone, Prop, lasix, and Fentanyl     Major Shift Events:       Blood gas completed, vent changes made    Precedex started    Cards consulted, Cardizem gtt stopped, amiodarone dose changed    Amiodarone bolus and gtt started, Cardizem gtt discontinued    Unable to wean prop off , BIS up to 50, when off propofol    D 5 infusion added    Water flushes increased to 230 cc Q 4 hours    Lasix gtt started  Plan (Upcoming Events): continue vent management, wean as able, continue IV abx, follow up labs and Cx  Discharge/Transfer Needs: TBD     Bedside Shift Report Completed : yes  Bedside Safety Check Completed: yes     Notified provider about indwelling el catheter discussed removal or continued need.     Did provider choose to remove indwelling el catheter? no     Provider's el indication for keeping indwelling el catheter: Indication for continued use: Strict 1-2 Hour I & O if external catheters are not an option     Is there an order for indwelling el catheter? YES     *If there is a plan to keep el  catheter in place at discharge daily notification with provider is not necessary, but please add a notation in the treatment team sticky note that the patient will be discharging with the catheter.

## 2023-07-01 NOTE — PROGRESS NOTES
Interval ICU note    Patient with adequate response to Lasix infusion.  However still appears to be volume overloaded.  Increase Lasix to 5 mg an hour.  We will continue to monitor    Jn Madrigal MD  Critical Care Medicine

## 2023-07-01 NOTE — PROGRESS NOTES
[facility] Cardiology Progress Note       Assessment and Plan:   Paroxysmal/persistent atrial fibrillation setting of respiratory failure patient with underlying COPD.    Acute anemia with macrocytosis.  Hemoglobin dropped from 10-7 during this admission.  Thrombocytopenia.    Remains predominately in sinus rhythm at this time.  Suspect atrial arrhythmias due to metabolic disturbances.  Can try stopping diltiazem at this time(may drop BP) but continue IV amiodarone.    Guarded prognosis.              Interval History:   Chart reviewed, A 57-year-old lady with COPD paroxysmal atrial fibrillation hypothyroidism who had successful cardioversion yesterday.  Remains intubated.  Reviewed rhythm monitoring.  Telemetry sinus goal short runs of A-fib or PSVT.  Multiple APCs as well.  Requiring pressors.                  Medications:     Current Facility-Administered Medications   Medication     acetaminophen (TYLENOL) solution 650 mg    Or     acetaminophen (TYLENOL) Suppository 650 mg     albuterol (PROVENTIL) 100 mg in 20 mL inhalation solution     amiodarone (CORDARONE) 1.8 mg/mL in sodium chloride 0.9% 500 mL ADULT STANDARD infusion     apixaban ANTICOAGULANT (ELIQUIS) tablet 5 mg     vecuronium (NORCURON) 1 mg/mL in D5W 50 mL    And     artificial tears ophthalmic ointment     atorvastatin (LIPITOR) tablet 20 mg     cyclobenzaprine (FLEXERIL) tablet 5 mg     dextrose 5% and 0.45% NaCl infusion     glucose gel 15-30 g    Or     dextrose 50 % injection 25-50 mL    Or     glucagon injection 1 mg     diltiazem (CARDIZEM) 125 mg in dextrose 5 % 125 mL infusion     [Held by provider] diltiazem (CARDIZEM) tablet 60 mg     sennosides (SENOKOT) syrup 5-10 mL    And     docusate (COLACE) 50 MG/5ML liquid  mg     fentaNYL (SUBLIMAZE) 50 mcg/mL bolus from pump     fentaNYL (SUBLIMAZE) infusion     fiber modular (NUTRISOURCE FIBER) packet 1 packet     flumazenil (ROMAZICON) injection 0.2 mg     furosemide (LASIX)  injection 40 mg     hydrALAZINE (APRESOLINE) injection 10 mg     hydrALAZINE (APRESOLINE) tablet 25 mg     insulin aspart (NovoLOG) injection (RAPID ACTING)     ipratropium (ATROVENT) 0.02 % neb solution 0.5 mg     lactated ringers infusion     levalbuterol (XOPENEX) neb solution 0.63 mg     levalbuterol (XOPENEX) neb solution 0.63 mg     levothyroxine (SYNTHROID/LEVOTHROID) tablet 112 mcg     LORazepam (ATIVAN) injection 0.5 mg     meropenem (MERREM) 1 g vial to attach to  mL bag     methylPREDNISolone sodium succinate (solu-MEDROL) injection 40 mg     midazolam (VERSED) drip - ADULT 100 mg/100 mL in NS (pre-mix)     midazolam (VERSED) injection 2 mg     multivitamins w/minerals liquid 15 mL     naloxone (NARCAN) injection 0.2 mg    Or     naloxone (NARCAN) injection 0.4 mg    Or     naloxone (NARCAN) injection 0.2 mg    Or     naloxone (NARCAN) injection 0.4 mg     nicotine (NICODERM CQ) 14 MG/24HR 24 hr patch 1 patch     nicotine Patch in Place     ondansetron (ZOFRAN ODT) ODT tab 4 mg    Or     ondansetron (ZOFRAN) injection 4 mg     pantoprazole (PROTONIX) 2 mg/mL suspension 40 mg     PARoxetine (PAXIL) suspension 20 mg     Patient is already receiving anticoagulation with heparin, enoxaparin (LOVENOX), warfarin (COUMADIN)  or other anticoagulant medication     phenylephrine (TONY-SYNEPHRINE) 50 mg in NaCl 0.9 % 250 mL infusion     polyethylene glycol (MIRALAX) Packet 17 g     potassium chloride 20 mEq in 50 mL intermittent infusion     prochlorperazine (COMPAZINE) injection 10 mg    Or     prochlorperazine (COMPAZINE) tablet 10 mg    Or     prochlorperazine (COMPAZINE) suppository 25 mg     propofol (DIPRIVAN) infusion     protein modular (PROSOURCE TF20) packet 1 packet     sodium chloride (PF) 0.9% PF flush 10-20 mL     sodium chloride (PF) 0.9% PF flush 10-40 mL     sodium chloride (PF) 0.9% PF flush 10-40 mL     sodium chloride (PF) 0.9% PF flush 10-40 mL     sodium chloride (PF) 0.9% PF flush 3 mL  "    sodium chloride (PF) 0.9% PF flush 3 mL             Physical Exam:   Blood pressure 139/84, pulse 84, temperature 97  F (36.1  C), resp. rate 18, height 1.6 m (5' 3\"), weight 79.1 kg (174 lb 6.1 oz), SpO2 (!) 77 %.  Wt Readings from Last 4 Encounters:   23 79.1 kg (174 lb 6.1 oz)   23 68.8 kg (151 lb 9.6 oz)   23 60 kg (132 lb 4.4 oz)   22 61.3 kg (135 lb 2.3 oz)         Vital Sign Ranges  Temperature Temp  Av  F (37.2  C)  Min: 97  F (36.1  C)  Max: 100.6  F (38.1  C)   Blood pressure No data recorded.       No data recorded.      Pulse Pulse  Av  Min: 84  Max: 179   Respirations Resp  Av.1  Min: 16  Max: 20   Pulse oximetry SpO2  Av.3 %  Min: 77 %  Max: 100 %         Intake/Output Summary (Last 24 hours) at 2023 0748  Last data filed at 2023 0704  Gross per 24 hour   Intake 4514.14 ml   Output 3554 ml   Net 960.14 ml          Cardiovascular:   Normal apical impulse, regular rate and rhythm, normal S1 and S2, no S3 or S4, and no murmur noted   Chest clear.  No peripheral oedema         Data:     Labs:  Lab Results   Component Value Date     2023     2006    Lab Results   Component Value Date    CHLORIDE 108 2023    CHLORIDE 99 2023    CHLORIDE 103 2022    CHLORIDE 109 2006    Lab Results   Component Value Date    BUN 41.9 2023    BUN 7 2023    BUN 17 2022    BUN <2 2006      Lab Results   Component Value Date    POTASSIUM 3.5 2023    POTASSIUM 5.1 2023    POTASSIUM 4.5 2022    POTASSIUM 3.4 2006    Lab Results   Component Value Date    CO2 41 2023    CO2 29 2022    CO2 17 2006    Lab Results   Component Value Date    CR 0.57 2023    CR 0.80 2006        Lab Results   Component Value Date    WBC 10.2 2023    HGB 7.7 (L) 2023    HCT 25.7 (L) 2023     (H) 2023    PLT 84 (L) 2023     No results found " for: TROPONIN, TROPI, TROPR        Attestation:  I have reviewed today's vital signs, notes, medications, labs and imaging.         DR BRYON PERAZA MD 7/1/2023  7:48 AM

## 2023-07-01 NOTE — PROVIDER NOTIFICATION
[employer] called and Lakisha NEVES was spoken to regarding patient's K. Patient has had a lot of ectopy throughout the night, K this morning 3.5, patient only has Phosph and Mag replacement protocols wondering if we want to add a Potasium replacement protocol as well. Will await any new orders.

## 2023-07-01 NOTE — PROGRESS NOTES
"[facility] ICU VENTILATOR RESPIRATORY NOTE     Date of Admission: 6/20/23     Date of Intubation (most recent): 6/21/23     Reason for Mechanical Ventilation: Respiratory failure     Number of Days on Mechanical Ventilation: 11     Met Criteria for Pressure Support Trial: no     Reason for No Pressure Support Trial: Airway unstable     Significant Events Today: Started on second dose of continuous albuterol nebulizer tonight     ETT appearance on chest x-ray: 5.5 cm above stevie     Vent Mode: CMV/AC  (Continuous Mandatory Ventilation/ Assist Control)  FiO2 (%): (S) 60 %  Resp Rate (Set): 16 breaths/min  Tidal Volume (Set, mL): 270 mL  PEEP (cm H2O): 8 cmH2O  Resp: 16    Recent Labs   Lab 06/30/23  0933 06/30/23  0820 06/28/23  2241 06/28/23  0952   PH 7.35 7.36 7.35 7.31*   PCO2 85* 85* 85* 99*   PO2 64* 67* 58* 88   HCO3 47* 47* 46* 50*   O2PER 40 40 60 60     Vital signs:  Temp: 97.5  F (36.4  C) Temp src: Bladder   Pulse: 96   Resp: 16 SpO2: 100 % O2 Device: Mechanical Ventilator   Height: 160 cm (5' 3\") Weight: 79.1 kg (174 lb 6.1 oz)  Estimated body mass index is 30.89 kg/m  as calculated from the following:    Height as of this encounter: 1.6 m (5' 3\").    Weight as of this encounter: 79.1 kg (174 lb 6.1 oz).            "

## 2023-07-01 NOTE — PROGRESS NOTES
"CLINICAL NUTRITION SERVICES - BRIEF NOTE     Nutrition Prescription    RECOMMENDATIONS FOR MDs/PROVIDERS TO ORDER:  None    Malnutrition Status:    Patient does not meet criteria for malnutrition    Recommendations already ordered by Registered Dietitian (RD):  Discontinued fiber as pt restarted on vasopressor bringing totals to 1258kcals, 133g CHO including modulars, propofol, and IV Dextrose infusion.     Future/Additional Recommendations:  Consider reassessing needs to avoid overfeeding dt PCO2 103 and dry wt gain  Adjust flushes pending hydration       EVALUATION OF THE PROGRESS TOWARD GOALS   Diet: NPO  Nutrition Support: Pt w/ NJ tube placed 6/28/23. Pt receiving Vital High Protein @ 35ml/hr x 22 hours (held 1 hour before and after levothyroxine) and 200ml free water flushes every 4 hours and 30ml before and after feeding.  Intake: At goal, Vital High Protein providing 770kcals, 67g protein, 85g CHO, 18g fat, 644ml free water, 1260ml water from flushes for a total of 1904ml fluid daily.    Propofol @ 4.7ml/hr providing 124kcals    1pkt Nutrisource Fiber TID, 1pkt Prosource TF20 BID bringing totals including propofol to 1099kcals, 97g CHO, 107g protein, 9g fiber daily    D5 IV @ 50ml/hr providing 204kcals, 60g CHO, and 1200ml fluid daily further increasing totals to 1303kcals, 145g CHO, 3104ml fluid daily.      NEW FINDINGS     07/01/23 0448 79.1 kg (174 lb 6.1 oz)   06/30/23 0600 79.1 kg (174 lb 6.1 oz)   06/29/23 0430 78.2 kg (172 lb 6.4 oz)   06/28/23 0556 75.5 kg (166 lb 7.2 oz)   06/27/23 0600 75.3 kg (166 lb 0.1 oz)   06/26/23 0615 76.4 kg (168 lb 6.9 oz)   06/25/23 0530 77 kg (169 lb 12.1 oz)   06/24/23 0600 73.7 kg (162 lb 7.7 oz)   06/23/23 0345 67.7 kg (149 lb 4 oz)   06/21/23 0304 64.5 kg (142 lb 3.2 oz)     Net fluid up 6.7L (14.7lbs) since admit per I/Os    ANTHROPOMETRICS  Height: 160 cm (5' 3\")  Most Recent Weight: 79.1 kg (174 lb 6.1 oz)    IBW: 52.4 kg  BMI: Obesity Grade I BMI 30-34.9  Weight " History:   Wt Readings from Last 30 Encounters:   07/01/23 79.1 kg (174 lb 6.1 oz)   04/01/23 68.8 kg (151 lb 9.6 oz)   02/11/23 60 kg (132 lb 4.4 oz)   12/02/22 61.3 kg (135 lb 2.3 oz)   09/28/22 63.1 kg (139 lb 1.6 oz)   08/29/22 61.7 kg (136 lb 1.6 oz)   05/17/22 61.2 kg (134 lb 14.4 oz)   04/02/22 58.9 kg (129 lb 12.8 oz)   02/21/22 59 kg (130 lb)   02/04/22 59 kg (130 lb)   02/04/22 59 kg (130 lb)   01/11/22 60.7 kg (133 lb 14.4 oz)       PHYSICAL FINDINGS  See malnutrition section below.  Scattered bruising  Arm skin tears     GI CONCERNS  Diarrhea, LBM 7/1/23    LABS  Reviewed: Na 151, Un 41.9, anion gap 2, Mg 2.5, PCO2 103, hgb 8.8, hematocrit 25.7, platelet count 84, rbc 2.31, , MCH 33.3, MCHC 30.0    MEDICATIONS  Reviewed: Nutrisource fiber, lasix, Novolog, synthroid/levothroid, magnesium sulfate, merrem, solu-medrol, multivitamin w/ minerals, protonix, potassium chloride, prosource TF, D5 infusion, Diprivan, neosynephrine,     ASSESSED NUTRITION NEEDS  Dose weight: 64.5 kg  Estimated Energy Needs: 6278-8166 kcals/day (20-25 kcal/kg)  Justification: Vented  Estimated Protein Needs:  grams protein/day (1.2 - 2 grams of pro/kg)  Justification: Hypercatabolism with critical illness  Estimated Fluid Needs: 1925 mL/day (30 mL/kg)   Justification: Maintenance

## 2023-07-01 NOTE — PROGRESS NOTES
[facility]  Hospitalist Progress Note  Albin Navarro MD 07/1/23    Reason for Stay (Diagnosis): Respiratory failure, COPD exacerbation, pneumothorax         Assessment and Plan:      Summary of Stay:   Lara Melendez is a 57 year old female with PMH including COPD, emphysema, as needed 2-3 L O2 at home, tobacco dependence, hypertension, anxiety, paroxysmal atrial fibrillation on Eliquis, hypothyroidism, psoriasis, and hyperlipidemia who presented on 6/21 with worsening shortness of breath and wheezing.  Symptom onset was a few days prior to presentation and consisting of wheezing so her pulmonologist prescribed azithromycin and prednisone.  She took about 3 days of these medications.  Due to continued decline she called EMS and was brought to the ED for evaluation.     In the emergency department she had a clear COPD exacerbation so was given IV steroids, IV magnesium, multiple nebulizers and placed on BiPAP.  Imaging showed no clear evidence of pneumonia but she remained on azithromycin.  She was admitted to the ICU.     The day following admission her respiratory condition deteriorated to the point that she required intubation and mechanical ventilation.  She had significant bronchospasm and high airway pressures so required deep sedation with fentanyl, ketamine and propofol with addition of vecuronium.  She was given a continuous albuterol nebulizer with not much improvement.  Pulmonology was consulted as well.  The case was even discussed with [facility] and she is not an ECMO candidate.  Repeat x-ray 6/26 showed a left-sided pneumothorax and IR placed a chest tube, thoracic surgery was consulted.  Worsening hypernatremia with tube feeds, free water increased and D5 given with improvement.  She developed a fever to 102  F and she was started on IV clindamycin 6/26.  Persistent fevers and new leukocytosis antibiotics broadened to vancomycin and cefepime.  Blood cultures negative.  Sputum culture  growing Klebsiella oxytoca resistant to IV Zosyn, no cefepime results so changed to IV meropenem given ongoing fevers.  Vancomycin discontinued with negative MRSA screen.  Urine culture growing Pseudomonas.  She was able to wean off vecuronium and ketamine for 48 hours.  IV diuresis has continued.    Acutely worsening 6/30 with A-fib with RVR and an alternate SVT not responsive to IV diltiazem or initial IV amiodarone load.  Cardioverted due to soft blood pressure and persistent rates of 170 and she converted to sinus tachycardia but still has frequent very short-lived runs of SVT lasting a few seconds, continuing IV amiodarone.  Vent dyssynchrony during the day 6/30 and guppy breathing so now under deep sedation with propofol, fentanyl, Versed, and vecuronium and restarted this afternoon.  Phenylephrine infusion added for hypotension due to deep sedation and amiodarone.    Problem List/Assessment and Plan:   Acute on chronic  hypoxemic and hypercapnic respiratory failure  COPD with acute exacerbation  Left-sided pneumothorax  Possible pneumonia: At baseline she is on chronic oxygen of 2 to 3 L by nasal cannula as needed.  She has had several admissions for severe COPD requiring BiPAP, although has not previously been intubated.  Initially during this hospitalization she was on BiPAP but she quickly deteriorated so was intubated 6/21.  She developed high airway pressures and bronchospasm so required deep sedation with fentanyl, ketamine, and propofol.  Vecuronium was added due to persistent high peak pressures. Continuous albuterol nebulizer for 24 hours provided no benefit and even seem to increase bronchospasm and has been discontinued.    Chest x-ray 6/26 showed a left-sided pneumothorax likely barotrauma in setting of emphysema and high airway pressures due to severe COPD exacerbation.  Chest tube was placed by IR.  Finished a 5-day course of azithromycin earlier.  -Intensivist to manage ventilator  -Pulmonology  consulted  -Currently using low tidal volume strategy given high ventilator pressures resulting in pneumothorax.  Due to this she does have increasing CO2 retention, but is for the most part compensating with a pH of 7.3, PCO2 90s, and peak HCO3 of 45-50.  Respiratory rate mildly increased, but overall allowing a compensated respiratory acidosis.  -Continue IV methylprednisolone 40 mg twice daily   -Receiving scheduled Xopenex and Atrovent nebs.  Stop continuous albuterol neb.  -Sedation with IV fentanyl, propofol, Versed due to vent dyssynchrony and need for paralytic  -back on vecuronium infusion 6/30 due to accessory abdominal muscle use/guppy breathing  -Ketamine stopped 6/28  -Continue IV furosemide to 40 mg twice daily.  Using concentrated amiodarone and minimizing extra IV fluids as able.  -Was having persistent fevers 102  F and leukocytosis so she was initially started on IV clindamycin and then changed to IV vancomycin and IV cefepime on 6/28.  Blood cultures remain negative.  Urine culture growing Pseudomonas.  Sputum culture 6/26 is now growing Klebsiella oxytoca that is resistant to Zosyn, antibiotics were changed to IV meropenem 1 g every 8 hours while awaiting further sensitivities, culture did come back sensitive to cefepime.  Continue meropenem for now until clinically improved  -Discontinue vancomycin 6/30 given negative MRSA nasal screen  -Thoracic surgery consulted for chest tube management.  Likely keep chest tube in until extubated.  -Dr. Ovalles previously discussed the case with [facility] on she is not an ECMO candidate due to her chronic respiratory failure.    Paroxysmal atrial fibrillation  SVT: PTA on diltiazem and Eliquis.  -Has been receiving Eliquis during hospitalization  -Was on oral diltiazem and then she developed worsening A-fib with RVR rates up to 160-170s throughout the day 6/30.  Also,had an alternate SVT with rate 190-200 during the morning.  Diltiazem infusion ineffective.  IV  amiodarone load started.  Blood pressure dropping and heart rate remained at 170 so she was cardioverted to sinus tachycardia after cardiology consultation.  Additional 150 mg IV amiodarone given per cardiology Gallito.    -Having frequent very short runs of SVT lasting only few seconds.  Continue IV amiodarone infusion for the next 24 hours and reassess.    -Stop diltiazem infusion  -Avoiding beta-blockers due to severe COPD    Hypotension:  Secondary to sedation and cardiac meds.  -Wean phenylephrine infusion as able    Possible CAUTI: Hines catheter was removed and urinalysis was obtained 6/28 that shows large blood, large LE, >182 WBCs and RBCs so a UTI is possible, but given the large blood it does make the pyuria difficult to interpret.  Urine culture growing  K Pseudomonas.  -Now on IV meropenem 1 g every 8 hours, continue for now until improved    Hyperkalemia: Potassium bola to 5.5 and persisted in that range for a day or 2.  She received several doses of Lokelma and potassium level has now normalized.  Unclear etiology.  Renal function is normal.  Her PTA losartan has been on hold.    Hypernatremia: Significant rise in sodium now up to 160 with tube feed initiation.  Improved with high-dose free water via NG and D5.  -Now variable sodium with diuretics  -Change free water to 230 mL every 4 hours  -Start D5 at 50 mL/h    Steroid-induced hyperglycemia: Continue medium sliding scale aspart.  Reasonable glycemic control at this time with most blood sugars in the 150 range.    Lactic acidosis: Likely due to nebulizers and hypoxia.     Anxiety: Holding prior to admission clonazepam.  Continue paroxetine and as needed IV lorazepam.    HTN: Initially had some soft blood pressure, but more recently hypertensive.  -PTA oral diltiazem now held due to hypotension  -Hesitant to resume losartan with recent issues involving hyperkalemia and hesitant to start beta-blockers with her severe reactive airway disease.  "  -Hold p.o. hydralazine due to soft blood pressure    Tobacco dependence: Nicotine replacement.    Chronic anemia: Hemoglobin at baseline of 10-11.    DVT Prophylaxis: Eliquis  Code Status: Full Code  Lines: PICC line, arterial line, PIV, Hines catheter, NG, left-sided chest tube  FEN: Tube feeds, dietitian consulted  Discharge Dispo: TBD  Estimated Disch Date / # of Days until Disch: Continue ICU level cares, anticipate ongoing prolonged hospitalization    Updated spouse Luis at the bedside    Clinically Significant Risk Factors         # Hypernatremia: Highest Na = 151 mmol/L in last 2 days, will monitor as appropriate  # Hypocalcemia: Lowest Ca = 7.5 mg/dL in last 2 days, will monitor and replace as appropriate     # Hypoalbuminemia: Lowest albumin = 3.3 g/dL at 6/30/2023  1:27 PM, will monitor as appropriate   # Thrombocytopenia: Lowest platelets = 84 in last 2 days, will monitor for bleeding   # Hypertension: Noted on problem list        # Obesity: Estimated body mass index is 30.89 kg/m  as calculated from the following:    Height as of this encounter: 1.6 m (5' 3\").    Weight as of this encounter: 79.1 kg (174 lb 6.1 oz).                       Interval History (Subjective):      Has remained in sinus tachycardia following cardioversion yesterday afternoon.  She does have frequent short-lived runs of SVT up to the 170s lasting only a few seconds.  She was put on diltiazem infusion at 5 mics per hour which we are stopping now due to hypotension requiring phenylephrine.  Stopping the continuous albuterol neb.  Hypercapnic so respiratory rate increased.  Urine output 4.4 L.                  Physical Exam:      Last Vital Signs:  /84 (BP Location: Left leg)   Pulse 107   Temp 98.1  F (36.7  C)   Resp 12   Ht 1.6 m (5' 3\")   Wt 79.1 kg (174 lb 6.1 oz)   SpO2 95%   BMI 30.89 kg/m      Intake/Output Summary (Last 24 hours) at 6/30/2023 1523  Last data filed at 6/30/2023 1512  Gross per 24 hour   Intake " 3134.14 ml   Output 3755 ml   Net -620.86 ml     Constitutional: Intubated and sedated  Eyes: sclera white   HEENT: ET tube  Respiratory:   Slight right-sided expiratory wheeze.  Left side clear.  No anterior crackles.  Cardiovascular: Mostly regular rhythm although occasional premature beats.  Tachycardic.  No murmur appreciated  GI: non-tender, not distended, bowel sounds present  Genitourinary: Hines catheter with yellow urine output  Skin: no rash    Musculoskeletal/extremities: 2+ pitting anasarca  Neurologic: Sedated  Psychiatric: calm          Medications:      All current medications were reviewed with changes reflected in problem list.         Data:      All new lab and imaging data were personally reviewed.   Labs:  Recent Labs   Lab 07/01/23  1204 07/01/23  1040 07/01/23  0808 07/01/23  0601 07/01/23  0011 06/30/23  2258 06/30/23  1612 06/30/23  1327 06/30/23  0820 06/30/23  0505   NA  --   --   --  151*  --   --   --  151*  --  150*   POTASSIUM  --  4.2  --  3.5  --  4.4  --  4.6  --  4.6   CHLORIDE  --   --   --  108*  --   --   --  102  --  103   CO2  --   --   --  41*  --   --   --  44*  --  44*   ANIONGAP  --   --   --  2*  --   --   --  5*  --  3*   *  --  139* 133*   < >  --    < > 203*   < > 173*   BUN  --   --   --  41.9*  --   --   --  38.7*  --  37.7*   CR  --   --   --  0.57  --   --   --  0.52  --  0.47*   GFRESTIMATED  --   --   --  >90  --   --   --  >90  --  >90   EDIN  --   --   --  7.5*  --   --   --  8.6  --  8.6    < > = values in this interval not displayed.     Recent Labs   Lab 07/01/23  1040 07/01/23  0601 06/30/23  0505 06/29/23  0549   WBC  --  10.2 11.2* 12.0*   HGB 8.8* 7.7* 9.7* 9.0*   HCT  --  25.7* 32.1* 30.7*   MCV  --  111* 109* 113*   PLT  --  84* 111* 105*        Recent Labs   Lab 06/28/23  1040 06/28/23  1001 06/26/23 2112 06/26/23  2100 06/26/23 2059   CULTURE No growth after 2 days  No growth after 2 days 50,000-100,000 CFU/mL Pseudomonas aeruginosa* No  growth after 4 days No growth after 4 days 1+ Normal fadi  1+ Klebsiella oxytoca*     Last Arterial Blood Gas:  pH Arterial   Date Value Ref Range Status   07/01/2023 7.27 (L) 7.35 - 7.45 Final     pCO2 Arterial   Date Value Ref Range Status   07/01/2023 103 (HH) 35 - 45 mm Hg Final     pO2 Arterial   Date Value Ref Range Status   07/01/2023 197 (H) 80 - 105 mm Hg Final     Bicarbonate Arterial   Date Value Ref Range Status   07/01/2023 47 (HH) 21 - 28 mmol/L Final     Base Excess/Deficit (+/-)   Date Value Ref Range Status   07/01/2023 17.1 (H) -9.0 - 1.8 mmol/L Final         Imaging:   None today    Albin Navarro MD

## 2023-07-01 NOTE — PLAN OF CARE
Problem: COPD (Chronic Obstructive Pulmonary Disease)  Goal: Improved Nutrition Intake  Outcome: Progressing     Problem: Enteral Nutrition  Goal: Feeding Tolerance  Outcome: Progressing     Goal Outcome Evaluation:      Plan of Care Reviewed With: other (see comments) (chart review)    Overall Patient Progress: decliningOverall Patient Progress: declining    Outcome Evaluation: Discontinued fiber as pt restarted on vasopressor bringing totals to 1258kcals, 133g CHO including modulars, propofol, and IV Dextrose infusion.

## 2023-07-01 NOTE — PROGRESS NOTES
Critical Care  Note      07/01/2023    Name: Lara Melendez MRN#: [id number]   Age: 57 year old YOB: 1965     Hsptl Day# 10  ICU DAY # 10    MV DAY # 10             Problem List:   Principal Problem:    Paroxysmal atrial fibrillation with RVR (H)  Active Problems:    COPD exacerbation (H)    Anticoagulated    Acute and chronic respiratory failure with hypercapnia (H)           Summary/Hospital Course:   57-year-old female with history of COPD, emphysema, O2 dependent at home, tobacco dependence, hypertension, anxiety, atrial fibrillation on Eliquis, hypothyroidism presents with worsening shortness of breath and wheezing.  She appears to be in status asthmaticus versus worsening COPD exacerbation.  Patient was prescribed azithromycin and prednisone prior to admission.  She continued decline was brought to EMS for evaluation.  Where she was given IV steroids, IV magnesium nebulizers and BiPAP.  After admission to the ICU she failed a BiPAP trial and required intubation mechanical ventilation.  She had significant bronchospasm with high airway pressures requiring deep sedation with fentanyl ketamine and propofol and addition of a paralytic.  Events of last 24 hours noted for episodes of RVR with associated atrial fibrillation and SVT.  Patient was cardioverted back into sinus rhythm with frequent PACs.  Patient did have episodes of SVT overnight and was started on diltiazem infusion.  This morning diltiazem infusion was stopped and patient remained on amiodarone infusion.  She remains heavily sedated and paralytic given her increased airway pressures.  This morning her airway pressures were back down to the mid 30s and she was noted to have a partially completed respiratory acidosis for which the vent was adjusted.  Airway pressures remain in the mid 30s.      Assessment and plan :     Lara Melendez IS a 57 year old female admitted on 6/20/2023 for, acute respiratory failure secondary to COPD  exacerbation, small left-sided pneumothorax and history of atrial fibrillation on Eliquis..   I have personally reviewed the daily labs, imaging studies, cultures and discussed the case with referring physician and consulting physicians.     My assessment and plan by system for this patient is as follows:    Neurology/Psychiatry:   1.  Continues require deep sedation given her dyssynchrony with the vent  2 vecuronium for vent synchrony  3.      Plan  Continue the patient on vecuronium for vent synchrony.  Plan would be to stop vecuronium in a.m. and reassess patient's respiratory status.  Given patient's relative hypotension requiring phenylephrine we will decrease propofol and use Versed per RN merrily for sedation.  Keep patient's BIS monitoring in the range of 40-50.    Cardiovascular:   1.Hemodynamics -normotensive to hypertensive  2.Rhythm sinus with PACs  3. Ischemia -no signs of ischemia  Plan  Appreciate cardiology input, states patient status post successful cardioversion.  We will stop diltiazem infusion this morning.  Maintain patient on amiodarone.  Patient requiring phenylephrine for soft blood pressures.  Would continue phenylephrine to avoid tachycardia associated with epinephrine or norepinephrine.  If we are able to wean propofol and increase Versed we may be able to wean phenylephrine.    Pulmonary/Ventilator Management:   1. Airway intubated for acute ventilatory failure  2. Oxygenation/ventilation/mechanics on full mechanical ventilatory support with 50% FiO2 and 8 of PEEP.  Plan  -Continue patient on current regimen, continue patient on full mechanical ventilatory support.  Chest tube shows minimal output  Initially this morning airway pressures of the mid 20s.  I adjusted the patient's ventilator to allow more exhalation time.  Arterial blood gas shows a partially uncompensated respiratory acidosis most likely secondary to low minute ventilation as tidal volume was decreased with elevated  airway pressures.  Elevated pressures now in the mid 30s with appropriate oxygenation.  We will decrease PEEP to 5 and increase tidal volume slowly maintaining peak pressures less than 40.  By doing this we were able to maintain tidal volume of 310 with peak airway pressures around 38.  We will check another arterial blood gas in a.m.  Plan would be to decrease vecuronium in a.m. and reassess vent synchrony.    GI and Nutrition :   1.  Concern for protein calorie malnutrition    Plan  -Resume enteral feeds.    Renal/Fluids/Electrolytes:   1.  No acute renal injury at this time  2.  Mild hypernatremia  3.  Compensated respiratory acidosis no significant change with vent manipulation  4.  Appears hypervolemic  Plan  -We will continue to monitor, hyperkalemia seems to be resolving  - monitor function and electrolytes as needed with replacement per ICU protocols. - generally avoid nephrotoxic agents such as NSAID, IV contrast unless specifically required  - adjust medications as needed for renal clearance  - follow I/O's as appropriate.  -Hypernatremia seems to be resolving we will decrease free water at this time.  Would continue monitor sodium.  -We will continue Lasix diuresis      Infectious Disease:   1.  Concern for UTI and positive sputum culture.    Plan  -Continue patient on cefepime while awaiting antibiotic sensitivities.  We will stop vancomycin at this time.    Endocrine:   1.  Concern for stress-induced hyperglycemia  2.  Concern for diabetes induced hyperglycemia  3.  Plan  - ICU insulin protocol, goal sugar <180      Hematology/Oncology:   1.  No leukocytosis  2.  Mild anemia most likely secondary to hemodilution  3.  On Eliquis for atrial fibrillation  Plan  -Continue to monitor     IV/Access:   1. Venous access -central access  2. Arterial access -arterial line  3.  Plan  - central access required and necessary      ICU Prophylaxis:   1. DVT: On Eliquis  2. VAP: HOB 30 degrees, chlorhexidine rinse  3.  Stress Ulcer: PPI/H2 blocker  4. Restraints: Nonviolent soft two point restraints required and necessary for patient safety and continued cares and good effect as patient continues to pull at necessary lines, tubes despite education and distraction. Will readdress daily.   5. Wound care  -local wound care  6. Feeding -continue tube feeds  7. Family Update: I talked to the  at bedside today, updated him on events of yesterday.  I explained to him that we will DC the diltiazem and continue the patient on amiodarone.  I explained to him that we are titrating the vent to airway pressures to avoid any barotrauma.  He was updated that tomorrow we will stop the vecuronium.  I offered him the opportunity ask any questions which he did not have.  Titrate tidal volume up to increase minute ventilation  8. Disposition -ICU        Key goals for next 24 hours:   1.  To correct acidosis  2.  Wean propofol as tolerated, use Versed  3.  Continue paralytic               Medical History:     Past Medical History:   Diagnosis Date     Anxiety      COPD (chronic obstructive pulmonary disease) - 2L home O2      Diverticulitis of colon      Hypercholesterolemia      Hypertension      Hypothyroidism      Paroxysmal atrial fibrillation      Psoriasis      Pulmonary nodule - left upper lobe      Past Surgical History:   Procedure Laterality Date     CHOLECYSTECTOMY       INCISION AND DRAINAGE MANDIBLE, COMBINED Left 01/10/2022    Procedure: INCISION AND DRAINAGE, MANDIBLE;  Surgeon: Jn Feliz DDS;  Location: [facility] OR     INCISION AND DRAINAGE MANDIBLE, COMBINED Left 02/04/2022    Procedure: INCISION AND DRAINAGE, MANDIBLE;  Surgeon: Taylor Ortez DDS;  Location: [facility] OR     TOOTH EXTRACTION       Vocal Cord surgery       Social History     Socioeconomic History     Marital status:      Spouse name: Not on file     Number of children: Not on file     Years of education: Not on file     Highest education level: Not on file    Occupational History     Not on file   Tobacco Use     Smoking status: Never     Smokeless tobacco: Never   Substance and Sexual Activity     Alcohol use: Yes     Comment: occ     Drug use: Never     Sexual activity: Not Currently   Other Topics Concern     Not on file   Social History Narrative     Not on file     Social Determinants of Health     Financial Resource Strain: Not on file   Food Insecurity: Not on file   Transportation Needs: Not on file   Physical Activity: Not on file   Stress: Not on file   Social Connections: Not on file   Intimate Partner Violence: Not on file   Housing Stability: Not on file        Allergies   Allergen Reactions     Sulfa Antibiotics      Doxycycline Other (See Comments)     Develops chest tightness  Intolerance not allergy     Penicillins Rash     Generalized rash  Rash, Generalized                Key Medications:       apixaban ANTICOAGULANT  5 mg Per Feeding Tube BID     artificial tears   Both Eyes Q8H     atorvastatin  20 mg Per Feeding Tube Daily     [Held by provider] diltiazem  60 mg Oral or Feeding Tube Q6H ELZBIETA     fiber modular (NUTRISOURCE FIBER)  1 packet Per Feeding Tube TID     furosemide  40 mg Intravenous Q12H     hydrALAZINE  25 mg Oral Q8H ELZBIETA     insulin aspart  1-6 Units Subcutaneous Q4H     ipratropium  0.5 mg Nebulization 4x daily     levalbuterol  0.63 mg Nebulization 4x Daily     levothyroxine  112 mcg Per Feeding Tube Daily     meropenem  1 g Intravenous Q8H     methylPREDNISolone  40 mg Intravenous Q24H     multivitamins w/minerals  15 mL Per Feeding Tube Daily     nicotine  1 patch Transdermal Daily     nicotine   Transdermal Q8H     pantoprazole  40 mg Per Feeding Tube Daily     PARoxetine  20 mg Per Feeding Tube Daily     protein modular  1 packet Per Feeding Tube BID     sodium chloride (PF)  10-40 mL Intracatheter Q8H     sodium chloride (PF)  3 mL Intracatheter Q8H       amiodarone (NEXTERONE) 6 mg/mL in sodium chloride 0.9% in non-PVC container  250 mL MAX concentration CENTRAL line infusion 0.25 mg/min (07/01/23 1046)     D5W 50 mL/hr at 07/01/23 1000     fentaNYL 200 mcg/hr (07/01/23 1000)     lactated ringers Stopped (06/28/23 0011)     midazolam 9 mg/hr (07/01/23 1045)     - MEDICATION INSTRUCTIONS -       phenylephrine 1 mcg/kg/min (07/01/23 1000)     propofol 20 mcg/kg/min (07/01/23 1045)     vecuronium (NORCURON) 1 mg/mL in D5W 50 mL 1.2 mcg/kg/min (07/01/23 1045)        Home Meds  No current facility-administered medications on file prior to encounter.  albuterol (PROAIR HFA/PROVENTIL HFA/VENTOLIN HFA) 108 (90 Base) MCG/ACT inhaler, Inhale 2 puffs into the lungs every 4 hours as needed for shortness of breath / dyspnea or wheezing Inhale 1-2 Puffs every 4 hours as needed for Wheezing.  albuterol (PROVENTIL) (2.5 MG/3ML) 0.083% neb solution, Take 1 vial (2.5 mg) by nebulization every 4 hours as needed for shortness of breath or wheezing  apixaban ANTICOAGULANT (ELIQUIS) 5 MG tablet, Take 1 tablet (5 mg) by mouth 2 times daily  atorvastatin (LIPITOR) 20 MG tablet, Take 20 mg by mouth daily  clonazePAM (KLONOPIN) 0.5 MG tablet, Take 0.5 mg by mouth daily  cyclobenzaprine (FLEXERIL) 5 MG tablet, Take 1 tablet (5 mg) by mouth every 8 hours as needed for muscle spasms  diclofenac (VOLTAREN) 1 % topical gel, Apply 2 g topically 4 times daily  diltiazem ER COATED BEADS (CARDIZEM CD/CARTIA XT) 240 MG 24 hr capsule, Take 1 capsule (240 mg) by mouth daily  fluticasone-salmeterol (ADVAIR-HFA) 230-21 MCG/ACT inhaler, Inhale 2 puffs into the lungs 2 times daily  ipratropium - albuterol 0.5 mg/2.5 mg/3 mL (DUONEB) 0.5-2.5 (3) MG/3ML neb solution, Take 1 vial (3 mLs) by nebulization every 6 hours as needed for shortness of breath / dyspnea or wheezing  levothyroxine (SYNTHROID/LEVOTHROID) 112 MCG tablet, Take 112 mcg by mouth daily  losartan (COZAAR) 25 MG tablet, Take 1 tablet (25 mg) by mouth daily  nicotine (NICODERM CQ) 21 MG/24HR 24 hr patch, Place 1 patch  onto the skin daily  PARoxetine (PAXIL) 20 MG tablet, Take 20 mg by mouth daily  predniSONE (DELTASONE) 20 MG tablet, Take 60 mg for 2 days, then 40 mg for 3 days, then 20 mg for 3 days, then 10 mg for 3 days, then stop  tiotropium (SPIRIVA RESPIMAT) 2.5 MCG/ACT inhaler, Inhale 2 puffs into the lungs daily as needed               Physical Examination:   Temp:  [96.8  F (36  C)-100.6  F (38.1  C)] 97.7  F (36.5  C)  Pulse:  [] 90  Resp:  [12-20] 14  MAP:  [60 mmHg-99 mmHg] 72 mmHg  Arterial Line BP: (100-153)/(3-65) 112/49  FiO2 (%):  [50 %-80 %] 50 %  SpO2:  [77 %-100 %] 99 %    Intake/Output Summary (Last 24 hours) at 6/26/2023 1215  Last data filed at 6/26/2023 1200  Gross per 24 hour   Intake 2195.19 ml   Output 4555 ml   Net -2359.81 ml     Wt Readings from Last 4 Encounters:   07/01/23 79.1 kg (174 lb 6.1 oz)   04/01/23 68.8 kg (151 lb 9.6 oz)   02/11/23 60 kg (132 lb 4.4 oz)   12/02/22 61.3 kg (135 lb 2.3 oz)     Arterial Line BP: (100-153)/(3-65) 112/49  MAP:  [60 mmHg-99 mmHg] 72 mmHg  Vent Mode: CMV/AC  (Continuous Mandatory Ventilation/ Assist Control)  FiO2 (%): 50 %  Resp Rate (Set): 16 breaths/min  Tidal Volume (Set, mL): 310 mL  PEEP (cm H2O): 5 cmH2O  Resp: 14    Recent Labs   Lab 07/01/23  0808 06/30/23  0933 06/30/23  0820 06/28/23  2241   PH 7.27* 7.35 7.36 7.35   PCO2 103* 85* 85* 85*   PO2 197* 64* 67* 58*   HCO3 47* 47* 47* 46*   O2PER 70 40 40 60       GEN: no acute distress consistent with chemical sedation  HEENT: head ncat, sclera anicteric, OP patent, trachea midline   PULM: unlabored synchronous with vent, diffuse wheezes anteriorly but improving overall however wheezes increased this morning, chest tube in good position show signs of titling however no air leak  CV/COR:IRR S1S2 no gallop,  No rub, no murmur  ABD: soft nontender, hypoactive bowel sounds, no mass  EXT:  Edema   warm  NEURO: Does not follow commands, consistent with chemical sedation  SKIN: no obvious rash  LINES:  clean, dry intact         Data:   All data and imaging reviewed     ROUTINE ICU LABS (Last four results)  CMP  Recent Labs   Lab 07/01/23  1040 07/01/23  0808 07/01/23  0601 07/01/23  0414 07/01/23  0011 06/30/23  2258 06/30/23  1612 06/30/23  1327 06/30/23  0820 06/30/23  0505 06/29/23 2006 06/29/23  1758 06/29/23  0822 06/29/23  0549 06/28/23  0733 06/28/23  0346 06/27/23  0352 06/27/23  0348 06/26/23  0807 06/26/23  0352   NA  --   --  151*  --   --   --   --  151*  --  150*  --  149*   < > 148*  148*   < > 159*  159*   < > 158*  --  147*   POTASSIUM 4.2  --  3.5  --   --  4.4  --  4.6  --  4.6  --   --   --  4.1  --  3.9  3.9   < > 4.1  4.1   < > 4.3  4.3   CHLORIDE  --   --  108*  --   --   --   --  102  --  103  --   --   --  102  --  112*  --  110*  --  101   CO2  --   --  41*  --   --   --   --  44*  --  44*  --   --   --  43*  --  45*  --  45*  --  38*   ANIONGAP  --   --  2*  --   --   --   --  5*  --  3*  --   --   --  3*  --  2*  --  3*  --  8   GLC  --  139* 133* 151* 131*  --    < > 203*   < > 173*   < >  --    < > 205*   < > 236*   < > 183*   < > 170*   BUN  --   --  41.9*  --   --   --   --  38.7*  --  37.7*  --   --   --  33.9*  --  36.1*  --  41.0*  --  49.9*   CR  --   --  0.57  --   --   --   --  0.52  --  0.47*  --   --   --  0.51  --  0.57  --  0.62  --  0.67   GFRESTIMATED  --   --  >90  --   --   --   --  >90  --  >90  --   --   --  >90  --  >90  --  >90  --  >90   EDIN  --   --  7.5*  --   --   --   --  8.6  --  8.6  --   --   --  8.4*  --  9.0  --  9.1  --  8.8   MAG  --   --  2.5*  --   --  2.9*  --  2.4*  --  2.4*  --   --   --  2.5*  --  2.5*  --  2.6*  --  2.4*   PHOS  --   --  3.6  --   --   --   --   --   --   --   --   --   --   --   --  2.7  --  3.0  --  3.8   PROTTOTAL  --   --   --   --   --   --   --  5.7*  --   --   --   --   --   --   --   --   --   --   --   --    ALBUMIN  --   --   --   --   --   --   --  3.3*  --   --   --   --   --   --   --   --   --   --   --   --     BILITOTAL  --   --   --   --   --   --   --  0.3  --   --   --   --   --   --   --   --   --   --   --   --    ALKPHOS  --   --   --   --   --   --   --  42  --   --   --   --   --   --   --   --   --   --   --   --    AST  --   --   --   --   --   --   --  43  --   --   --   --   --   --   --   --   --   --   --   --    ALT  --   --   --   --   --   --   --  53*  --   --   --   --   --   --   --   --   --   --   --   --     < > = values in this interval not displayed.     CBC  Recent Labs   Lab 07/01/23  1040 07/01/23  0601 06/30/23  0505 06/29/23  0549 06/28/23  0346   WBC  --  10.2 11.2* 12.0* 13.4*   RBC  --  2.31* 2.94* 2.73* 3.06*   HGB 8.8* 7.7* 9.7* 9.0* 10.2*   HCT  --  25.7* 32.1* 30.7* 35.1   MCV  --  111* 109* 113* 115*   MCH  --  33.3* 33.0 33.0 33.3*   MCHC  --  30.0* 30.2* 29.3* 29.1*   RDW  --  14.2 13.7 14.2 14.3   PLT  --  84* 111* 105* 138*     INR  No lab results found in last 7 days.  Arterial Blood Gas  Recent Labs   Lab 07/01/23  0808 06/30/23  0933 06/30/23  0820 06/28/23  2241   PH 7.27* 7.35 7.36 7.35   PCO2 103* 85* 85* 85*   PO2 197* 64* 67* 58*   HCO3 47* 47* 47* 46*   O2PER 70 40 40 60       All cultures:  No results for input(s): CULT in the last 168 hours.  Recent Results (from the past 24 hour(s))   XR Chest Port 1 View    Narrative    CHEST ONE VIEW  6/26/2023 9:29 AM     HISTORY: Increasing oxygen requirements and airway pressure.    COMPARISON: June 23, 2023      Impression    IMPRESSION: Endotracheal tube tip 4.3 cm from the stevie. Right PICC  line stable. Minimal pleural fluid or pleural thickening bilaterally  similar to previous. Small-to-moderate pneumothorax on the left new  from previous.    Results called to the patient's nurse on June 26, 2023 at 9:44 AM.     GHAZALA ORELLANA MD         SYSTEM ID:  [id number]         Billing: This patient is critically ill: yes. Total critical care time today34 min.            Jn Madrigal MD  Critical Care Medicine

## 2023-07-02 ENCOUNTER — APPOINTMENT (OUTPATIENT)
Dept: GENERAL RADIOLOGY | Facility: CLINIC | Age: 58
End: 2023-07-02
Attending: ANESTHESIOLOGY
Payer: COMMERCIAL

## 2023-07-02 LAB
ALLEN'S TEST: NO
ANION GAP SERPL CALCULATED.3IONS-SCNC: 3 MMOL/L (ref 7–15)
ANION GAP SERPL CALCULATED.3IONS-SCNC: 3 MMOL/L (ref 7–15)
BACTERIA BLD CULT: NO GROWTH
BACTERIA BLD CULT: NO GROWTH
BASE EXCESS BLDA CALC-SCNC: 23.9 MMOL/L (ref -9–1.8)
BUN SERPL-MCNC: 37 MG/DL (ref 8–23)
BUN SERPL-MCNC: 37.3 MG/DL (ref 8–23)
CALCIUM SERPL-MCNC: 8 MG/DL (ref 8.6–10)
CALCIUM SERPL-MCNC: 8.3 MG/DL (ref 8.6–10)
CHLORIDE SERPL-SCNC: 104 MMOL/L (ref 98–107)
CHLORIDE SERPL-SCNC: 98 MMOL/L (ref 98–107)
CREAT SERPL-MCNC: 0.49 MG/DL (ref 0.51–0.95)
CREAT SERPL-MCNC: 0.53 MG/DL (ref 0.51–0.95)
DEPRECATED HCO3 PLAS-SCNC: 45 MMOL/L (ref 22–29)
DEPRECATED HCO3 PLAS-SCNC: 48 MMOL/L (ref 22–29)
ERYTHROCYTE [DISTWIDTH] IN BLOOD BY AUTOMATED COUNT: 13.7 % (ref 10–15)
GFR SERPL CREATININE-BSD FRML MDRD: >90 ML/MIN/1.73M2
GFR SERPL CREATININE-BSD FRML MDRD: >90 ML/MIN/1.73M2
GLUCOSE BLDC GLUCOMTR-MCNC: 116 MG/DL (ref 70–99)
GLUCOSE BLDC GLUCOMTR-MCNC: 122 MG/DL (ref 70–99)
GLUCOSE BLDC GLUCOMTR-MCNC: 158 MG/DL (ref 70–99)
GLUCOSE BLDC GLUCOMTR-MCNC: 201 MG/DL (ref 70–99)
GLUCOSE BLDC GLUCOMTR-MCNC: 205 MG/DL (ref 70–99)
GLUCOSE SERPL-MCNC: 121 MG/DL (ref 70–99)
GLUCOSE SERPL-MCNC: 224 MG/DL (ref 70–99)
HCO3 BLD-SCNC: 52 MMOL/L (ref 21–28)
HCT VFR BLD AUTO: 26.7 % (ref 35–47)
HGB BLD-MCNC: 8 G/DL (ref 11.7–15.7)
MAGNESIUM SERPL-MCNC: 1.9 MG/DL (ref 1.7–2.3)
MAGNESIUM SERPL-MCNC: 2.2 MG/DL (ref 1.7–2.3)
MCH RBC QN AUTO: 33.3 PG (ref 26.5–33)
MCHC RBC AUTO-ENTMCNC: 30 G/DL (ref 31.5–36.5)
MCV RBC AUTO: 111 FL (ref 78–100)
O2/TOTAL GAS SETTING VFR VENT: 50 %
PCO2 BLD: 81 MM HG (ref 35–45)
PH BLD: 7.42 [PH] (ref 7.35–7.45)
PHOSPHATE SERPL-MCNC: 1.6 MG/DL (ref 2.5–4.5)
PHOSPHATE SERPL-MCNC: 2.9 MG/DL (ref 2.5–4.5)
PLATELET # BLD AUTO: 106 10E3/UL (ref 150–450)
PO2 BLD: 71 MM HG (ref 80–105)
POTASSIUM SERPL-SCNC: 3.7 MMOL/L (ref 3.4–5.3)
POTASSIUM SERPL-SCNC: 4.6 MMOL/L (ref 3.4–5.3)
RBC # BLD AUTO: 2.4 10E6/UL (ref 3.8–5.2)
SARS-COV-2 RNA RESP QL NAA+PROBE: NEGATIVE
SODIUM SERPL-SCNC: 149 MMOL/L (ref 136–145)
SODIUM SERPL-SCNC: 152 MMOL/L (ref 136–145)
TSH SERPL DL<=0.005 MIU/L-ACNC: 0.31 UIU/ML (ref 0.3–4.2)
WBC # BLD AUTO: 14.2 10E3/UL (ref 4–11)

## 2023-07-02 PROCEDURE — 200N000001 HC R&B ICU

## 2023-07-02 PROCEDURE — 85027 COMPLETE CBC AUTOMATED: CPT | Performed by: INTERNAL MEDICINE

## 2023-07-02 PROCEDURE — 94640 AIRWAY INHALATION TREATMENT: CPT | Mod: 76

## 2023-07-02 PROCEDURE — 87635 SARS-COV-2 COVID-19 AMP PRB: CPT | Performed by: ANESTHESIOLOGY

## 2023-07-02 PROCEDURE — 80048 BASIC METABOLIC PNL TOTAL CA: CPT | Performed by: INTERNAL MEDICINE

## 2023-07-02 PROCEDURE — 94003 VENT MGMT INPAT SUBQ DAY: CPT

## 2023-07-02 PROCEDURE — 250N000009 HC RX 250: Performed by: ANESTHESIOLOGY

## 2023-07-02 PROCEDURE — 250N000013 HC RX MED GY IP 250 OP 250 PS 637: Performed by: INTERNAL MEDICINE

## 2023-07-02 PROCEDURE — 71045 X-RAY EXAM CHEST 1 VIEW: CPT

## 2023-07-02 PROCEDURE — 250N000013 HC RX MED GY IP 250 OP 250 PS 637: Performed by: SURGERY

## 2023-07-02 PROCEDURE — 250N000011 HC RX IP 250 OP 636: Performed by: INTERNAL MEDICINE

## 2023-07-02 PROCEDURE — 84100 ASSAY OF PHOSPHORUS: CPT | Performed by: INTERNAL MEDICINE

## 2023-07-02 PROCEDURE — 258N000003 HC RX IP 258 OP 636: Performed by: ANESTHESIOLOGY

## 2023-07-02 PROCEDURE — 84443 ASSAY THYROID STIM HORMONE: CPT | Performed by: INTERNAL MEDICINE

## 2023-07-02 PROCEDURE — 250N000009 HC RX 250: Performed by: INTERNAL MEDICINE

## 2023-07-02 PROCEDURE — 83735 ASSAY OF MAGNESIUM: CPT | Performed by: INTERNAL MEDICINE

## 2023-07-02 PROCEDURE — 250N000013 HC RX MED GY IP 250 OP 250 PS 637: Performed by: HOSPITALIST

## 2023-07-02 PROCEDURE — 250N000011 HC RX IP 250 OP 636: Performed by: SURGERY

## 2023-07-02 PROCEDURE — 250N000011 HC RX IP 250 OP 636: Performed by: ANESTHESIOLOGY

## 2023-07-02 PROCEDURE — 82803 BLOOD GASES ANY COMBINATION: CPT | Performed by: ANESTHESIOLOGY

## 2023-07-02 PROCEDURE — 99233 SBSQ HOSP IP/OBS HIGH 50: CPT | Performed by: INTERNAL MEDICINE

## 2023-07-02 PROCEDURE — 82310 ASSAY OF CALCIUM: CPT | Performed by: INTERNAL MEDICINE

## 2023-07-02 PROCEDURE — 250N000011 HC RX IP 250 OP 636: Mod: JZ | Performed by: INTERNAL MEDICINE

## 2023-07-02 PROCEDURE — 99291 CRITICAL CARE FIRST HOUR: CPT | Performed by: ANESTHESIOLOGY

## 2023-07-02 PROCEDURE — 999N000157 HC STATISTIC RCP TIME EA 10 MIN

## 2023-07-02 PROCEDURE — 258N000003 HC RX IP 258 OP 636: Performed by: INTERNAL MEDICINE

## 2023-07-02 PROCEDURE — 94640 AIRWAY INHALATION TREATMENT: CPT

## 2023-07-02 PROCEDURE — 250N000011 HC RX IP 250 OP 636: Mod: JZ | Performed by: ANESTHESIOLOGY

## 2023-07-02 RX ORDER — DIGOXIN 0.25 MG/ML
125 INJECTION INTRAMUSCULAR; INTRAVENOUS DAILY
Status: DISCONTINUED | OUTPATIENT
Start: 2023-07-03 | End: 2023-07-03

## 2023-07-02 RX ORDER — DIGOXIN 0.25 MG/ML
500 INJECTION INTRAMUSCULAR; INTRAVENOUS ONCE
Status: COMPLETED | OUTPATIENT
Start: 2023-07-02 | End: 2023-07-02

## 2023-07-02 RX ORDER — DILTIAZEM HYDROCHLORIDE 30 MG/1
60 TABLET, FILM COATED ORAL EVERY 6 HOURS SCHEDULED
Status: DISCONTINUED | OUTPATIENT
Start: 2023-07-02 | End: 2023-07-03

## 2023-07-02 RX ORDER — AMIODARONE HYDROCHLORIDE 200 MG/1
400 TABLET ORAL 2 TIMES DAILY
Status: DISCONTINUED | OUTPATIENT
Start: 2023-07-02 | End: 2023-07-02

## 2023-07-02 RX ORDER — POTASSIUM CHLORIDE 29.8 MG/ML
20 INJECTION INTRAVENOUS ONCE
Status: DISCONTINUED | OUTPATIENT
Start: 2023-07-02 | End: 2023-07-02

## 2023-07-02 RX ORDER — MAGNESIUM SULFATE HEPTAHYDRATE 40 MG/ML
2 INJECTION, SOLUTION INTRAVENOUS ONCE
Status: COMPLETED | OUTPATIENT
Start: 2023-07-02 | End: 2023-07-02

## 2023-07-02 RX ADMIN — MEROPENEM 1 G: 1 INJECTION, POWDER, FOR SOLUTION INTRAVENOUS at 05:45

## 2023-07-02 RX ADMIN — PHENYLEPHRINE HYDROCHLORIDE 1 MCG/KG/MIN: 50 INJECTION INTRAVENOUS at 06:12

## 2023-07-02 RX ADMIN — MINERAL OIL AND WHITE PETROLATUM: 150; 830 OINTMENT OPHTHALMIC at 05:04

## 2023-07-02 RX ADMIN — LEVALBUTEROL HYDROCHLORIDE 0.63 MG: 0.63 SOLUTION RESPIRATORY (INHALATION) at 07:59

## 2023-07-02 RX ADMIN — VECURONIUM BROMIDE 1.1 MCG/KG/MIN: 1 INJECTION, POWDER, LYOPHILIZED, FOR SOLUTION INTRAVENOUS at 04:54

## 2023-07-02 RX ADMIN — AMIODARONE HYDROCHLORIDE 0.5 MG/MIN: 900 INJECTION, SOLUTION INTRAVENOUS at 13:24

## 2023-07-02 RX ADMIN — DEXTROSE MONOHYDRATE: 50 INJECTION, SOLUTION INTRAVENOUS at 02:29

## 2023-07-02 RX ADMIN — IPRATROPIUM BROMIDE 0.5 MG: 0.5 SOLUTION RESPIRATORY (INHALATION) at 11:18

## 2023-07-02 RX ADMIN — Medication 15 ML: at 08:30

## 2023-07-02 RX ADMIN — INSULIN ASPART 2 UNITS: 100 INJECTION, SOLUTION INTRAVENOUS; SUBCUTANEOUS at 16:43

## 2023-07-02 RX ADMIN — LEVOTHYROXINE SODIUM 112 MCG: 0.11 TABLET ORAL at 08:30

## 2023-07-02 RX ADMIN — MEROPENEM 1 G: 1 INJECTION, POWDER, FOR SOLUTION INTRAVENOUS at 13:49

## 2023-07-02 RX ADMIN — Medication 40 MG: at 08:32

## 2023-07-02 RX ADMIN — MAGNESIUM SULFATE HEPTAHYDRATE 2 G: 2 INJECTION, SOLUTION INTRAVENOUS at 15:49

## 2023-07-02 RX ADMIN — DILTIAZEM HYDROCHLORIDE 60 MG: 30 TABLET, FILM COATED ORAL at 23:01

## 2023-07-02 RX ADMIN — APIXABAN 5 MG: 5 TABLET, FILM COATED ORAL at 20:44

## 2023-07-02 RX ADMIN — IPRATROPIUM BROMIDE 0.5 MG: 0.5 SOLUTION RESPIRATORY (INHALATION) at 15:33

## 2023-07-02 RX ADMIN — PAROXETINE 20 MG: 10 SUSPENSION ORAL at 08:31

## 2023-07-02 RX ADMIN — Medication 200 MCG/HR: at 08:46

## 2023-07-02 RX ADMIN — DILTIAZEM HYDROCHLORIDE 60 MG: 30 TABLET, FILM COATED ORAL at 15:49

## 2023-07-02 RX ADMIN — DIGOXIN 500 MCG: 0.25 INJECTION INTRAMUSCULAR; INTRAVENOUS at 13:14

## 2023-07-02 RX ADMIN — MIDAZOLAM HYDROCHLORIDE 8 MG/HR: 1 INJECTION, SOLUTION INTRAVENOUS at 23:03

## 2023-07-02 RX ADMIN — MEROPENEM 1 G: 1 INJECTION, POWDER, FOR SOLUTION INTRAVENOUS at 21:45

## 2023-07-02 RX ADMIN — NICOTINE 1 PATCH: 14 PATCH, EXTENDED RELEASE TRANSDERMAL at 08:30

## 2023-07-02 RX ADMIN — POTASSIUM & SODIUM PHOSPHATES POWDER PACK 280-160-250 MG 2 PACKET: 280-160-250 PACK at 06:48

## 2023-07-02 RX ADMIN — METHYLPREDNISOLONE SODIUM SUCCINATE 40 MG: 40 INJECTION, POWDER, FOR SOLUTION INTRAMUSCULAR; INTRAVENOUS at 08:31

## 2023-07-02 RX ADMIN — POTASSIUM & SODIUM PHOSPHATES POWDER PACK 280-160-250 MG 2 PACKET: 280-160-250 PACK at 10:42

## 2023-07-02 RX ADMIN — MIDAZOLAM HYDROCHLORIDE 8 MG/HR: 1 INJECTION, SOLUTION INTRAVENOUS at 11:47

## 2023-07-02 RX ADMIN — POTASSIUM & SODIUM PHOSPHATES POWDER PACK 280-160-250 MG 2 PACKET: 280-160-250 PACK at 15:49

## 2023-07-02 RX ADMIN — IPRATROPIUM BROMIDE 0.5 MG: 0.5 SOLUTION RESPIRATORY (INHALATION) at 07:59

## 2023-07-02 RX ADMIN — MIDAZOLAM 2 MG: 1 INJECTION INTRAMUSCULAR; INTRAVENOUS at 09:42

## 2023-07-02 RX ADMIN — DEXTROSE MONOHYDRATE: 50 INJECTION, SOLUTION INTRAVENOUS at 20:40

## 2023-07-02 RX ADMIN — ACETAMINOPHEN 650 MG: 325 SOLUTION ORAL at 13:49

## 2023-07-02 RX ADMIN — AMIODARONE HYDROCHLORIDE 400 MG: 200 TABLET ORAL at 12:22

## 2023-07-02 RX ADMIN — ATORVASTATIN CALCIUM 20 MG: 20 TABLET, FILM COATED ORAL at 08:30

## 2023-07-02 RX ADMIN — MIDAZOLAM HYDROCHLORIDE 12 MG/HR: 1 INJECTION, SOLUTION INTRAVENOUS at 02:58

## 2023-07-02 RX ADMIN — LEVALBUTEROL HYDROCHLORIDE 0.63 MG: 0.63 SOLUTION RESPIRATORY (INHALATION) at 11:17

## 2023-07-02 RX ADMIN — INSULIN ASPART 2 UNITS: 100 INJECTION, SOLUTION INTRAVENOUS; SUBCUTANEOUS at 12:22

## 2023-07-02 RX ADMIN — INSULIN ASPART 1 UNITS: 100 INJECTION, SOLUTION INTRAVENOUS; SUBCUTANEOUS at 20:38

## 2023-07-02 RX ADMIN — ACETAMINOPHEN 650 MG: 325 SOLUTION ORAL at 04:02

## 2023-07-02 RX ADMIN — FUROSEMIDE 5 MG/HR: 10 INJECTION, SOLUTION INTRAMUSCULAR; INTRAVENOUS at 08:48

## 2023-07-02 RX ADMIN — APIXABAN 5 MG: 5 TABLET, FILM COATED ORAL at 08:30

## 2023-07-02 RX ADMIN — LEVALBUTEROL HYDROCHLORIDE 0.63 MG: 0.63 SOLUTION RESPIRATORY (INHALATION) at 20:54

## 2023-07-02 RX ADMIN — VANCOMYCIN HYDROCHLORIDE 1500 MG: 10 INJECTION, POWDER, LYOPHILIZED, FOR SOLUTION INTRAVENOUS at 22:54

## 2023-07-02 RX ADMIN — IPRATROPIUM BROMIDE 0.5 MG: 0.5 SOLUTION RESPIRATORY (INHALATION) at 20:54

## 2023-07-02 RX ADMIN — PROPOFOL 20 MCG/KG/MIN: 10 INJECTION, EMULSION INTRAVENOUS at 04:14

## 2023-07-02 ASSESSMENT — ACTIVITIES OF DAILY LIVING (ADL)
ADLS_ACUITY_SCORE: 36

## 2023-07-02 NOTE — PROGRESS NOTES
[facility] ICU VENTILATOR RESPIRATORY NOTE  Date of Admission: 6/20/23  Date of Intubation (most recent): 6/21/23  Reason for Mechanical Ventilation: Respiratory failure  Number of Days on Mechanical Ventilation: 12  Met Criteria for Pressure Support Trial: No  Reason for No Pressure Support Trial: Pt on paralytic   ABG Results:   Recent Labs   Lab 07/01/23  0808 06/30/23  0933 06/30/23  0820 06/28/23  2241   PH 7.27* 7.35 7.36 7.35   PCO2 103* 85* 85* 85*   PO2 197* 64* 67* 58*   HCO3 47* 47* 47* 46*   O2PER 70 40 40 60     ETT appearance on chest x-ray: 5.5 cm above stevie    Plan:  RT to continue to monitor and assess pt's status.     Vent Mode: CMV/AC  (Continuous Mandatory Ventilation/ Assist Control)  FiO2 (%): 50 %  Resp Rate (Set): 16 breaths/min  Tidal Volume (Set, mL): 310 mL  PEEP (cm H2O): 5 cmH2O  Resp: 16

## 2023-07-02 NOTE — PROGRESS NOTES
Critical Care  Note      07/02/2023    Name: Lara Melendez MRN#: [id number]   Age: 57 year old YOB: 1965     Hsptl Day# 11  ICU DAY # 11    MV DAY # 11             Problem List:   Principal Problem:    Paroxysmal atrial fibrillation with RVR (H)  Active Problems:    COPD exacerbation (H)    Anticoagulated    Acute and chronic respiratory failure with hypercapnia (H)           Summary/Hospital Course:   57-year-old female with history of COPD, emphysema, O2 dependent at home, tobacco dependence, hypertension, anxiety, atrial fibrillation on Eliquis, hypothyroidism presents with worsening shortness of breath and wheezing.  She appears to be in status asthmaticus versus worsening COPD exacerbation.  Patient was prescribed azithromycin and prednisone prior to admission.  She continued decline was brought to EMS for evaluation.  Where she was given IV steroids, IV magnesium nebulizers and BiPAP.  After admission to the ICU she failed a BiPAP trial and required intubation mechanical ventilation.  She had significant bronchospasm with high airway pressures requiring deep sedation with fentanyl ketamine and propofol and addition of a paralytic.  Events of last 24 hours noted for episodes of RVR with associated atrial fibrillation and SVT.  Patient was cardioverted back into sinus rhythm with frequent PACs. .  Events of last 24 hours noted for patient being weaned off vecuronium.  She did have an episode of A-fib with RVR with relative soft pressures for which she underwent a cardioversion which converted her to atrial for with rate control.  We are weaning her propofol actively and phenylephrine also.    Assessment and plan :     Lara Melendez IS a 57 year old female admitted on 6/20/2023 for, acute respiratory failure secondary to COPD exacerbation, small left-sided pneumothorax and history of atrial fibrillation on Eliquis..   I have personally reviewed the daily labs, imaging studies, cultures and  discussed the case with referring physician and consulting physicians.     My assessment and plan by system for this patient is as follows:    Neurology/Psychiatry:   1.  Continues require deep sedation given her dyssynchrony with the vent  2 vecuronium for vent synchrony  3.      Plan once again we will trial stopping vecuronium.  Patient airway pressures are better so hopefully she does not need vecuronium for continued vent synchrony.  We will wean propofol as tolerated and continue to maintain her on Versed for sedation.    Cardiovascular:   1.Hemodynamics -normotensive to hypertensive  2.Rhythm sinus with PACs  3. Ischemia -no signs of ischemia  Plan  Appreciate cardiology input, agree with plan to start p.o. amiodarone.  Patient underwent 1 successful cardioversion this morning.  Wean phenylephrine as tolerated.  Hopefully as we wean propofol we should be able to come down on her phenylephrine dosing.    Pulmonary/Ventilator Management:   1. Airway intubated for acute ventilatory failure  2. Oxygenation/ventilation/mechanics on full mechanical ventilatory support with 50% FiO2 and 8 of PEEP.  Plan  -Overall her respiratory status seems to be slowly improving.  We were able to increase tidal volume and lower her respiratory rate to normal exhalation time yesterday.  Her airway pressures have not significantly increased and her compensated respiratory acidosis has improved.  We will continue her on a tidal volume of 330 with a PEEP of 5.  Oxygenation is adequate at this time.  Wean FiO2 as tolerated.  Continue her on scheduled nebs and steroids.  Continue chest tube until patient is extubated.  If patient does not make any further progress would need to start discussing tracheostomy early this week.    GI and Nutrition :   1.  Concern for protein calorie malnutrition    Plan  -Resume enteral feeds.    Renal/Fluids/Electrolytes:   1.  No acute renal injury at this time  2.  Mild hypernatremia  3.  Compensated  respiratory acidosis no significant change with vent manipulation  4.  Appears hypervolemic  Plan  -We will continue to monitor, hyperkalemia seems to be resolving  - monitor function and electrolytes as needed with replacement per ICU protocols. - generally avoid nephrotoxic agents such as NSAID, IV contrast unless specifically required  - adjust medications as needed for renal clearance  - follow I/O's as appropriate.  -Hypernatremia seems to be resolving we will decrease free water at this time.  Would continue monitor sodium.  -We will continue Lasix diuresis.  Patient is tolerating Lasix infusion      Infectious Disease:   1.  Concern for UTI and positive sputum culture.    Plan  -Continue patient on cefepime while awaiting antibiotic sensitivities.  We will stop vancomycin at this time.    Endocrine:   1.  Concern for stress-induced hyperglycemia  2.  Concern for diabetes induced hyperglycemia  3.  Plan  - ICU insulin protocol, goal sugar <180      Hematology/Oncology:   1.  No leukocytosis  2.  Mild anemia most likely secondary to hemodilution  3.  On Eliquis for atrial fibrillation  Plan  -Continue to monitor     IV/Access:   1. Venous access -central access  2. Arterial access -arterial line  3.  Plan  - central access required and necessary      ICU Prophylaxis:   1. DVT: On Eliquis  2. VAP: HOB 30 degrees, chlorhexidine rinse  3. Stress Ulcer: PPI/H2 blocker  4. Restraints: Nonviolent soft two point restraints required and necessary for patient safety and continued cares and good effect as patient continues to pull at necessary lines, tubes despite education and distraction. Will readdress daily.   5. Wound care  -local wound care  6. Feeding -continue tube feeds  7. Family Update: Discussed with patient's  and daughter who are at bedside today.  I updated them on the patient's cardioversion this a.m.  I explained to them that we would discuss with cardiology plans to continue p.o. amiodarone.  I  explained to them that her respiratory status has improved slightly but she still in guarded condition.  8. Disposition -ICU        Key goals for next 24 hours:   1.  Continue on current vent settings  2.  Wean propofol as tolerated, use Versed  3.  Wean phenylephrine as tolerated               Medical History:     Past Medical History:   Diagnosis Date     Anxiety      COPD (chronic obstructive pulmonary disease) - 2L home O2      Diverticulitis of colon      Hypercholesterolemia      Hypertension      Hypothyroidism      Paroxysmal atrial fibrillation      Psoriasis      Pulmonary nodule - left upper lobe      Past Surgical History:   Procedure Laterality Date     CHOLECYSTECTOMY       INCISION AND DRAINAGE MANDIBLE, COMBINED Left 01/10/2022    Procedure: INCISION AND DRAINAGE, MANDIBLE;  Surgeon: Jn Feliz DDS;  Location: [facility] OR     INCISION AND DRAINAGE MANDIBLE, COMBINED Left 02/04/2022    Procedure: INCISION AND DRAINAGE, MANDIBLE;  Surgeon: Taylor Ortez DDS;  Location: [facility] OR     TOOTH EXTRACTION       Vocal Cord surgery       Social History     Socioeconomic History     Marital status:      Spouse name: Not on file     Number of children: Not on file     Years of education: Not on file     Highest education level: Not on file   Occupational History     Not on file   Tobacco Use     Smoking status: Never     Smokeless tobacco: Never   Substance and Sexual Activity     Alcohol use: Yes     Comment: occ     Drug use: Never     Sexual activity: Not Currently   Other Topics Concern     Not on file   Social History Narrative     Not on file     Social Determinants of Health     Financial Resource Strain: Not on file   Food Insecurity: Not on file   Transportation Needs: Not on file   Physical Activity: Not on file   Stress: Not on file   Social Connections: Not on file   Intimate Partner Violence: Not on file   Housing Stability: Not on file        Allergies   Allergen Reactions     Sulfa  Antibiotics      Doxycycline Other (See Comments)     Develops chest tightness  Intolerance not allergy     Penicillins Rash     Generalized rash  Rash, Generalized                Key Medications:       amiodarone  400 mg Oral BID     apixaban ANTICOAGULANT  5 mg Per Feeding Tube BID     atorvastatin  20 mg Per Feeding Tube Daily     [Held by provider] diltiazem  60 mg Oral or Feeding Tube Q6H ELZBIETA     hydrALAZINE  25 mg Oral Q8H ELZBIETA     insulin aspart  1-6 Units Subcutaneous Q4H     ipratropium  0.5 mg Nebulization 4x daily     levalbuterol  0.63 mg Nebulization 4x Daily     levothyroxine  112 mcg Per Feeding Tube Daily     meropenem  1 g Intravenous Q8H     methylPREDNISolone  40 mg Intravenous Q24H     multivitamins w/minerals  15 mL Per Feeding Tube Daily     nicotine  1 patch Transdermal Daily     nicotine   Transdermal Q8H     pantoprazole  40 mg Per Feeding Tube Daily     PARoxetine  20 mg Per Feeding Tube Daily     potassium & sodium phosphates  2 packet Oral or Feeding Tube Q4H     protein modular  1 packet Per Feeding Tube BID     sodium chloride (PF)  10-40 mL Intracatheter Q8H     sodium chloride (PF)  3 mL Intracatheter Q8H       D5W 50 mL/hr at 07/02/23 1200     fentaNYL 100 mcg/hr (07/02/23 1200)     furosemide (LASIX) 100 mg in sodium chloride 0.9 % 100 mL infusion 5 mg/hr (07/02/23 1200)     lactated ringers Stopped (06/28/23 0011)     midazolam 8 mg/hr (07/02/23 1200)     - MEDICATION INSTRUCTIONS -       phenylephrine Stopped (07/02/23 1147)     propofol Stopped (07/02/23 1130)        Home Meds  No current facility-administered medications on file prior to encounter.  albuterol (PROAIR HFA/PROVENTIL HFA/VENTOLIN HFA) 108 (90 Base) MCG/ACT inhaler, Inhale 2 puffs into the lungs every 4 hours as needed for shortness of breath / dyspnea or wheezing Inhale 1-2 Puffs every 4 hours as needed for Wheezing.  albuterol (PROVENTIL) (2.5 MG/3ML) 0.083% neb solution, Take 1 vial (2.5 mg) by nebulization every  4 hours as needed for shortness of breath or wheezing  apixaban ANTICOAGULANT (ELIQUIS) 5 MG tablet, Take 1 tablet (5 mg) by mouth 2 times daily  atorvastatin (LIPITOR) 20 MG tablet, Take 20 mg by mouth daily  clonazePAM (KLONOPIN) 0.5 MG tablet, Take 0.5 mg by mouth daily  cyclobenzaprine (FLEXERIL) 5 MG tablet, Take 1 tablet (5 mg) by mouth every 8 hours as needed for muscle spasms  diclofenac (VOLTAREN) 1 % topical gel, Apply 2 g topically 4 times daily  diltiazem ER COATED BEADS (CARDIZEM CD/CARTIA XT) 240 MG 24 hr capsule, Take 1 capsule (240 mg) by mouth daily  fluticasone-salmeterol (ADVAIR-HFA) 230-21 MCG/ACT inhaler, Inhale 2 puffs into the lungs 2 times daily  ipratropium - albuterol 0.5 mg/2.5 mg/3 mL (DUONEB) 0.5-2.5 (3) MG/3ML neb solution, Take 1 vial (3 mLs) by nebulization every 6 hours as needed for shortness of breath / dyspnea or wheezing  levothyroxine (SYNTHROID/LEVOTHROID) 112 MCG tablet, Take 112 mcg by mouth daily  losartan (COZAAR) 25 MG tablet, Take 1 tablet (25 mg) by mouth daily  nicotine (NICODERM CQ) 21 MG/24HR 24 hr patch, Place 1 patch onto the skin daily  PARoxetine (PAXIL) 20 MG tablet, Take 20 mg by mouth daily  predniSONE (DELTASONE) 20 MG tablet, Take 60 mg for 2 days, then 40 mg for 3 days, then 20 mg for 3 days, then 10 mg for 3 days, then stop  tiotropium (SPIRIVA RESPIMAT) 2.5 MCG/ACT inhaler, Inhale 2 puffs into the lungs daily as needed               Physical Examination:   Temp:  [98.1  F (36.7  C)-100.9  F (38.3  C)] 100.9  F (38.3  C)  Pulse:  [] 120  Resp:  [8-16] 16  MAP:  [55 mmHg-88 mmHg] 86 mmHg  Arterial Line BP: ()/(37-61) 135/61  FiO2 (%):  [50 %-70 %] 60 %  SpO2:  [85 %-100 %] 95 %    Intake/Output Summary (Last 24 hours) at 6/26/2023 1215  Last data filed at 6/26/2023 1200  Gross per 24 hour   Intake 2195.19 ml   Output 4555 ml   Net -2359.81 ml     Wt Readings from Last 4 Encounters:   07/02/23 80.6 kg (177 lb 11.1 oz)   04/01/23 68.8 kg (151 lb  9.6 oz)   02/11/23 60 kg (132 lb 4.4 oz)   12/02/22 61.3 kg (135 lb 2.3 oz)     Arterial Line BP: ()/(37-61) 135/61  MAP:  [55 mmHg-88 mmHg] 86 mmHg  Vent Mode: CMV/AC  (Continuous Mandatory Ventilation/ Assist Control)  FiO2 (%): 60 %  Resp Rate (Set): 16 breaths/min  Tidal Volume (Set, mL): 310 mL  PEEP (cm H2O): 5 cmH2O  Resp: 16    Recent Labs   Lab 07/02/23  0751 07/01/23  0808 06/30/23  0933 06/30/23  0820   PH 7.42 7.27* 7.35 7.36   PCO2 81* 103* 85* 85*   PO2 71* 197* 64* 67*   HCO3 52* 47* 47* 47*   O2PER 50 70 40 40       GEN: no acute distress consistent with chemical sedation  HEENT: head ncat, sclera anicteric, OP patent, trachea midline   PULM: unlabored synchronous with vent, diffuse wheezes anteriorly but improving overall however wheezes increased this morning, chest tube in good position show signs of titling however no air leak  CV/COR: Irregular rate and rhythm S1S2 no gallop,  No rub, no murmur  ABD: soft nontender, hypoactive bowel sounds, no mass  EXT:  Edema   warm  NEURO: Does not follow commands, consistent with chemical sedation  SKIN: no obvious rash  LINES: clean, dry intact         Data:   All data and imaging reviewed     ROUTINE ICU LABS (Last four results)  CMP  Recent Labs   Lab 07/02/23  0732 07/02/23  0538 07/02/23  0404 07/01/23  2326 07/01/23  2322 07/01/23  1204 07/01/23  1040 07/01/23  0808 07/01/23  0601 07/01/23  0011 06/30/23  2258 06/30/23  1612 06/30/23  1327 06/30/23  0820 06/30/23  0505 06/28/23  0733 06/28/23  0346 06/27/23  0352 06/27/23  0348   NA  --  152*  --   --   --   --   --   --  151*  --   --   --  151*  --  150*   < > 159*  159*   < > 158*   POTASSIUM  --  3.7  --   --  4.3  --  4.2  --  3.5  --  4.4  --  4.6  --  4.6   < > 3.9  3.9   < > 4.1  4.1   CHLORIDE  --  104  --   --   --   --   --   --  108*  --   --   --  102  --  103   < > 112*  --  110*   CO2  --  45*  --   --   --   --   --   --  41*  --   --   --  44*  --  44*   < > 45*  --  45*    ANIONGAP  --  3*  --   --   --   --   --   --  2*  --   --   --  5*  --  3*   < > 2*  --  3*   * 121* 122* 122*  --    < >  --    < > 133*   < >  --    < > 203*   < > 173*   < > 236*   < > 183*   BUN  --  37.0*  --   --   --   --   --   --  41.9*  --   --   --  38.7*  --  37.7*   < > 36.1*  --  41.0*   CR  --  0.53  --   --   --   --   --   --  0.57  --   --   --  0.52  --  0.47*   < > 0.57  --  0.62   GFRESTIMATED  --  >90  --   --   --   --   --   --  >90  --   --   --  >90  --  >90   < > >90  --  >90   EDIN  --  8.0*  --   --   --   --   --   --  7.5*  --   --   --  8.6  --  8.6   < > 9.0  --  9.1   MAG  --  2.2  --   --   --   --   --   --  2.5*  --  2.9*  --  2.4*  --  2.4*   < > 2.5*  --  2.6*   PHOS  --  1.6*  --   --   --   --   --   --  3.6  --   --   --   --   --   --   --  2.7  --  3.0   PROTTOTAL  --   --   --   --   --   --   --   --   --   --   --   --  5.7*  --   --   --   --   --   --    ALBUMIN  --   --   --   --   --   --   --   --   --   --   --   --  3.3*  --   --   --   --   --   --    BILITOTAL  --   --   --   --   --   --   --   --   --   --   --   --  0.3  --   --   --   --   --   --    ALKPHOS  --   --   --   --   --   --   --   --   --   --   --   --  42  --   --   --   --   --   --    AST  --   --   --   --   --   --   --   --   --   --   --   --  43  --   --   --   --   --   --    ALT  --   --   --   --   --   --   --   --   --   --   --   --  53*  --   --   --   --   --   --     < > = values in this interval not displayed.     CBC  Recent Labs   Lab 07/02/23  0538 07/01/23  1040 07/01/23  0601 06/30/23  0505 06/29/23  0549   WBC 14.2*  --  10.2 11.2* 12.0*   RBC 2.40*  --  2.31* 2.94* 2.73*   HGB 8.0* 8.8* 7.7* 9.7* 9.0*   HCT 26.7*  --  25.7* 32.1* 30.7*   *  --  111* 109* 113*   MCH 33.3*  --  33.3* 33.0 33.0   MCHC 30.0*  --  30.0* 30.2* 29.3*   RDW 13.7  --  14.2 13.7 14.2   *  --  84* 111* 105*     INR  No lab results found in last 7 days.  Arterial Blood  Gas  Recent Labs   Lab 07/02/23  0751 07/01/23  0808 06/30/23  0933 06/30/23  0820   PH 7.42 7.27* 7.35 7.36   PCO2 81* 103* 85* 85*   PO2 71* 197* 64* 67*   HCO3 52* 47* 47* 47*   O2PER 50 70 40 40       All cultures:  No results for input(s): CULT in the last 168 hours.  Recent Results (from the past 24 hour(s))   XR Chest Port 1 View    Narrative    CHEST ONE VIEW  6/26/2023 9:29 AM     HISTORY: Increasing oxygen requirements and airway pressure.    COMPARISON: June 23, 2023      Impression    IMPRESSION: Endotracheal tube tip 4.3 cm from the stevie. Right PICC  line stable. Minimal pleural fluid or pleural thickening bilaterally  similar to previous. Small-to-moderate pneumothorax on the left new  from previous.    Results called to the patient's nurse on June 26, 2023 at 9:44 AM.     GHAZALA ORELLANA MD         SYSTEM ID:  [id number]         Billing: This patient is critically ill: yes. Total critical care time today33 min.            Jn Madrigal MD  Critical Care Medicine

## 2023-07-02 NOTE — PROGRESS NOTES
[facility] ICU VENTILATOR RESPIRATORY NOTE    Date of Admission: 6/20/23    Date of Intubation (most recent): 6/21/23    Reason for Mechanical Ventilation: Respiratory failure    Number of Days on Mechanical Ventilation: 11    Met Criteria for Pressure Support Trial: no    Reason for No Pressure Support Trial: Airway unstable      ABG Results:   Recent Labs   Lab 07/02/23  0751 07/01/23  0808 06/30/23  0933 06/30/23  0820   PH 7.42 7.27* 7.35 7.36   PCO2 81* 103* 85* 85*   PO2 71* 197* 64* 67*   HCO3 52* 47* 47* 47*   O2PER 50 70 40 40       ETT appearance on chest x-ray: 5.5 cm above stevie    Plan:  Patient remains on ventilator with settings of:  Vent Mode: CMV/AC  (Continuous Mandatory Ventilation/ Assist Control)  FiO2 (%): 60 %  Resp Rate (Set): 16 breaths/min  Tidal Volume (Set, mL): 310 mL  PEEP (cm H2O): 5 cmH2O  Resp: 17

## 2023-07-02 NOTE — PROVIDER NOTIFICATION
07/02/23 1430   Critical Test Results/Notification   Critical Lab Result (Lab Name and Value) CO2 levl 48   What Time Did The Lab Notify You? 1430   Provider Notified yes   Date of Provider Notification 07/02/23   Time of Provider Notification 1430   Mechanism of Provider notification page   What Provider Did You Notify? Melanie   Response no orders were obtained, but call back

## 2023-07-02 NOTE — PROGRESS NOTES
ICU End of Shift Summary.  For vital signs and complete assessments, please see documentation flowsheets.      Pertinent assessments:  CV: ST w/ frequent PAC's, PVC's, & non-sustained runs of SVT. Jarad increased to 1.5 to keep MAP >65 and Amio infusion still running.   Neuro: RASS -5, Vec, Twitch goal 1/4. Versed, Prop, and Fentanyl.   Pulm: Vent  Renal: Hines, IV lasix w/ good UO  GI: TF @ 35 (goal), free water flushes increased to 230 mL q4h, no BM overnight  Skin: No changes   ID: TMAX this shift 38.2 C- 1 dose of PRN tylenol given. WBC count up  Endo: q4h blood sugar checks with sliding scale     Discharge/Transfer Needs: Pending clinical status     Bedside Shift Report Completed : Yes   Bedside Safety Check Completed: Yes

## 2023-07-02 NOTE — PROGRESS NOTES
Patient in atrial fibrillation with RVR again.  Discussed with cardiology we will resume amiodarone infusion and start the patient on digoxin.  Digoxin load given and will be started on daily digoxin tomorrow.    Jn Madrigal MD  Critical Care Medicine

## 2023-07-02 NOTE — PLAN OF CARE
Goal Outcome Evaluation:      Plan of Care Reviewed With: patient, spouse    Overall Patient Progress: no change, Overall Patient Progress: no change          ICU End of Shift Summary.  For vital signs and complete assessments, please see documentation flowsheets.      Pertinent assessments:   Neuro: sedated on propofol, versed and Fentanyl, RASS -5,  CPOT 0,   Cardiac: tele; SR with frequent PACs.-120's. few unsustained afib/ RVR and SVT runs that last for few seconds at the time, MD aware.   Resp: on full vent support, FIO2 50%, RR 16,  and peep of 5 , small secretions suctioned through ETT, Chest Tube WDL  GI: abdomen distended, obese, TF at goal  : El WDL, good UOP  Skin: no changes  Lines: PICC, PIV, ART line, chest tube  Drips: Versed,  Amiodarone,  lasix,  D 5, and Fentanyl     Major Shift Events:        Blood gas completed, vecuronium discontinued    Amiodarone gtt stopped    0930 patient has sustained Afib/ RVR Runs, Heart Rate 170's. , MD Notified, Patient was cardioverted per MD Madrigal and  Melanie at 0944 am successfully    Propofol weaned off    Jarad weaned off    Fentanyl lowered to half    Cards consulted, PO Amiodarone started    Hydralazine discontinued    1230 patient stacking her breath, MD Madrigal notified, CXR ordered    1254 patient back in sustained Afib/ RVR, MD notified, Digoxin Load ordered and given , Amiodarone restarted per MD Madrigal and Cardiologist    1530 patient continues in sustained Afib/ RVR -150, MD Melanie notified , PO Cardizem restarted    Mag check 1.9, 2 g mag given    T-max 101.3, Tylenol given    Patient converted back to ST with PAC's at 1610    Vancomycin restarted  Plan (Upcoming Events): continue vent management, wean as able, continue Steroids, nebs,  IV abx, follow up labs and Cx  Discharge/Transfer Needs: TBD     Bedside Shift Report Completed : yes  Bedside Safety Check Completed: yes     Notified provider about indwelling el catheter discussed  removal or continued need.     Did provider choose to remove indwelling el catheter? no     Provider's el indication for keeping indwelling el catheter: Indication for continued use: Strict 1-2 Hour I & O if external catheters are not an option     Is there an order for indwelling el catheter? YES     *If there is a plan to keep el catheter in place at discharge daily notification with provider is not necessary, but please add a notation in the treatment team sticky note that the patient will be discharging with the catheter.

## 2023-07-02 NOTE — PROVIDER NOTIFICATION
07/02/23 0800   Critical Test Results/Notification   Critical Lab Result (Lab Name and Value) ABG PCO2 81, Bicarb 52   What Time Did The Lab Notify You? 0800   Provider Notified yes   Date of Provider Notification 07/02/23   Time of Provider Notification 0800   Mechanism of Provider notification verbal (face-to-face)   What Provider Did You Notify? Madrigal   Response orders obtained

## 2023-07-02 NOTE — PROGRESS NOTES
[facility] Cardiology Progress Note       Assessment and Plan:   Paroxysmal/persistent atrial fibrillation setting of respiratory failure quiring intubation in a patient with severe underlying COPD.    Acute anemia with macrocytosis.  Hemoglobin dropped from 10-7 during this admission.  8 today.  Thrombocytopenia.    We will continue amiodarone via feeding tube.  We will start 400 mg twice daily  Pressor and IV diuresis management per ICU team.  Guarded prognosis.              Interval History:   Chart reviewed, A 57-year-old lady with respiratory failure secondary to COPD exacerbation requiring intubation.  She also has paroxysmal atrial fibrillation hypothyroidism who had successful cardioversion on 6-30.  Remains intubated.  IV amiodarone was stopped this morning after 48 infusion and very shortly afterwards she developed A-fib with RVR up to 170 requiring cardioversion.  Now rhythm is sinus tachycardia rate 100-1 20 with frequent APCs.  On small doses of pressor, hopefully can wean off today.  Also on IV Lasix 5 mg.              Medications:     Current Facility-Administered Medications   Medication     acetaminophen (TYLENOL) solution 650 mg    Or     acetaminophen (TYLENOL) Suppository 650 mg     apixaban ANTICOAGULANT (ELIQUIS) tablet 5 mg     atorvastatin (LIPITOR) tablet 20 mg     cyclobenzaprine (FLEXERIL) tablet 5 mg     dextrose 5% infusion     glucose gel 15-30 g    Or     dextrose 50 % injection 25-50 mL    Or     glucagon injection 1 mg     [Held by provider] diltiazem (CARDIZEM) tablet 60 mg     sennosides (SENOKOT) syrup 5-10 mL    And     docusate (COLACE) 50 MG/5ML liquid  mg     fentaNYL (SUBLIMAZE) 50 mcg/mL bolus from pump     fentaNYL (SUBLIMAZE) infusion     flumazenil (ROMAZICON) injection 0.2 mg     furosemide (LASIX) 100 mg in sodium chloride 0.9 % 100 mL infusion     hydrALAZINE (APRESOLINE) injection 10 mg     hydrALAZINE (APRESOLINE) tablet 25 mg     insulin aspart  (NovoLOG) injection (RAPID ACTING)     ipratropium (ATROVENT) 0.02 % neb solution 0.5 mg     lactated ringers infusion     levalbuterol (XOPENEX) neb solution 0.63 mg     levalbuterol (XOPENEX) neb solution 0.63 mg     levothyroxine (SYNTHROID/LEVOTHROID) tablet 112 mcg     LORazepam (ATIVAN) injection 0.5 mg     meropenem (MERREM) 1 g vial to attach to  mL bag     methylPREDNISolone sodium succinate (solu-MEDROL) injection 40 mg     midazolam (VERSED) drip - ADULT 100 mg/100 mL in NS (pre-mix)     midazolam (VERSED) injection 2 mg     multivitamins w/minerals liquid 15 mL     naloxone (NARCAN) injection 0.2 mg    Or     naloxone (NARCAN) injection 0.4 mg    Or     naloxone (NARCAN) injection 0.2 mg    Or     naloxone (NARCAN) injection 0.4 mg     nicotine (NICODERM CQ) 14 MG/24HR 24 hr patch 1 patch     nicotine Patch in Place     ondansetron (ZOFRAN ODT) ODT tab 4 mg    Or     ondansetron (ZOFRAN) injection 4 mg     pantoprazole (PROTONIX) 2 mg/mL suspension 40 mg     PARoxetine (PAXIL) suspension 20 mg     Patient is already receiving anticoagulation with heparin, enoxaparin (LOVENOX), warfarin (COUMADIN)  or other anticoagulant medication     phenylephrine (TONY-SYNEPHRINE) 50 mg in NaCl 0.9 % 250 mL infusion     polyethylene glycol (MIRALAX) Packet 17 g     potassium & sodium phosphates (NEUTRA-PHOS) Packet 2 packet     prochlorperazine (COMPAZINE) injection 10 mg    Or     prochlorperazine (COMPAZINE) tablet 10 mg    Or     prochlorperazine (COMPAZINE) suppository 25 mg     propofol (DIPRIVAN) infusion     protein modular (PROSOURCE TF20) packet 1 packet     sodium chloride (PF) 0.9% PF flush 10-20 mL     sodium chloride (PF) 0.9% PF flush 10-40 mL     sodium chloride (PF) 0.9% PF flush 10-40 mL     sodium chloride (PF) 0.9% PF flush 10-40 mL     sodium chloride (PF) 0.9% PF flush 3 mL     sodium chloride (PF) 0.9% PF flush 3 mL             Physical Exam:   Blood pressure 139/84, pulse 113, temperature  "(!) 100.8  F (38.2  C), resp. rate 16, height 1.6 m (5' 3\"), weight 80.6 kg (177 lb 11.1 oz), SpO2 97 %.  Wt Readings from Last 4 Encounters:   23 80.6 kg (177 lb 11.1 oz)   23 68.8 kg (151 lb 9.6 oz)   23 60 kg (132 lb 4.4 oz)   22 61.3 kg (135 lb 2.3 oz)         Vital Sign Ranges  Temperature Temp  Av  F (37.2  C)  Min: 97  F (36.1  C)  Max: 100.6  F (38.1  C)   Blood pressure No data recorded.       No data recorded.      Pulse Pulse  Av  Min: 84  Max: 179   Respirations Resp  Av.1  Min: 16  Max: 20   Pulse oximetry SpO2  Av.3 %  Min: 77 %  Max: 100 %         Intake/Output Summary (Last 24 hours) at 2023 0748  Last data filed at 2023 0704  Gross per 24 hour   Intake 4514.14 ml   Output 3554 ml   Net 960.14 ml          Cardiovascular:   Normal apical impulse, regular rate and rhythm, normal S1 and S2, no S3 or S4, and no murmur noted   Chest clear.  No peripheral oedema         Data:     Labs:  Lab Results   Component Value Date     2023     2006    Lab Results   Component Value Date    CHLORIDE 108 2023    CHLORIDE 99 2023    CHLORIDE 103 2022    CHLORIDE 109 2006    Lab Results   Component Value Date    BUN 41.9 2023    BUN 7 2023    BUN 17 2022    BUN <2 2006      Lab Results   Component Value Date    POTASSIUM 3.5 2023    POTASSIUM 5.1 2023    POTASSIUM 4.5 2022    POTASSIUM 3.4 2006    Lab Results   Component Value Date    CO2 41 2023    CO2 29 2022    CO2 17 2006    Lab Results   Component Value Date    CR 0.57 2023    CR 0.80 2006        Lab Results   Component Value Date    WBC 14.2 (H) 2023    HGB 8.0 (L) 2023    HCT 26.7 (L) 2023     (H) 2023     (L) 2023     No results found for: TROPONIN, TROPI, TROPR        Attestation:  I have reviewed today's vital signs, notes, medications, labs " and imaging.         DR BRYON PERAZA MD 7/1/2023  7:48 AM

## 2023-07-02 NOTE — PROGRESS NOTES
[facility]  Hospitalist Progress Note  Albin Navarro MD 07/2/23    Reason for Stay (Diagnosis): Respiratory failure, COPD exacerbation, pneumothorax         Assessment and Plan:      Summary of Stay:   Lara Melendez is a 57 year old female with PMH including COPD, emphysema, as needed 2-3 L O2 at home, tobacco dependence, hypertension, anxiety, paroxysmal atrial fibrillation on Eliquis, hypothyroidism, psoriasis, and hyperlipidemia who presented on 6/21 with worsening shortness of breath and wheezing.  Symptom onset was a few days prior to presentation and consisting of wheezing so her pulmonologist prescribed azithromycin and prednisone.  She took about 3 days of these medications.  Due to continued decline she called EMS and was brought to the ED for evaluation.     In the emergency department she had a clear COPD exacerbation so was given IV steroids, IV magnesium, multiple nebulizers and placed on BiPAP.  Imaging showed no clear evidence of pneumonia but she remained on azithromycin.  She was admitted to the ICU.     The day following admission her respiratory condition deteriorated to the point that she required intubation and mechanical ventilation.  She had significant bronchospasm and high airway pressures so required deep sedation with fentanyl, ketamine and propofol with addition of vecuronium.  She was given a continuous albuterol nebulizer with not much improvement.  Pulmonology was consulted as well.  The case was even discussed with [facility] and she is not an ECMO candidate.  Repeat x-ray 6/26 showed a left-sided pneumothorax and IR placed a chest tube, thoracic surgery was consulted.  Worsening hypernatremia with tube feeds, free water increased and D5 given with improvement.  She developed a fever to 102  F and she was started on IV clindamycin 6/26.  Persistent fevers and new leukocytosis antibiotics broadened to vancomycin and cefepime.  Blood cultures negative.  Sputum culture  growing Klebsiella oxytoca resistant to IV Zosyn, no cefepime results so changed to IV meropenem given ongoing fevers.  Vancomycin discontinued with negative MRSA screen.  Urine culture growing Pseudomonas.  She was able to wean off vecuronium and ketamine for 48 hours.  Aggressive IV diuresis has continued, now on Lasix drip    Acutely worsening 6/30 with A-fib with RVR and an alternate SVT not responsive to IV diltiazem or initial IV amiodarone load.  Cardioverted due to soft blood pressure and persistent rates of 170 and she converted to sinus tachycardia but still has frequent very short-lived runs of SVT lasting a few seconds.  She was continued on IV amiodarone loading.  Vent dyssynchrony during the day 6/30 and guppy breathing she was back under deep sedation with propofol, fentanyl, Versed, and vecuronium. Phenylephrine infusion added for hypotension.  Stopped IV amiodarone after 48 hours and also stop vecuronium this morning.  Went back into A-fib with RVR with rates 170s and is now back in sinus tachycardia with PACs following cardioversion this morning.    Problem List/Assessment and Plan:   Acute on chronic  hypoxemic and hypercapnic respiratory failure  COPD with acute exacerbation  Left-sided pneumothorax  Possible pneumonia: At baseline she is on chronic oxygen of 2 to 3 L by nasal cannula as needed.  She has had several admissions for severe COPD requiring BiPAP, although has not previously been intubated.  Initially during this hospitalization she was on BiPAP but she quickly deteriorated so was intubated 6/21.  She developed high airway pressures and bronchospasm so required deep sedation with fentanyl, ketamine, and propofol.  Vecuronium was added due to persistent high peak pressures. Continuous albuterol nebulizer for 24 hours provided no benefit and even seem to increase bronchospasm and has been discontinued.    Chest x-ray 6/26 showed a left-sided pneumothorax likely barotrauma in setting of  emphysema and high airway pressures due to severe COPD exacerbation.  Chest tube was placed by IR.  Finished a 5-day course of azithromycin earlier.  -Intensivist to manage ventilator  -Pulmonology consulted  -Currently using low tidal volume strategy given high ventilator pressures resulting in pneumothorax.  Due to this she does have increasing CO2 retention, but is for the most part compensating with a pH of 7.3, PCO2 90s, and peak HCO3 of 45-50.  Respiratory rate mildly increased, but overall allowing a compensated respiratory acidosis.  -Continue IV methylprednisolone 40 mg twice daily   -Receiving scheduled Xopenex and Atrovent nebs.   -Sedation with IV fentanyl, propofol, Versed, weaning sedation  -Again stopped vecuronium   -Ketamine stopped 6/28  -Continue aggressive IV diuresis with Lasix drip at 5 mg/h, monitor K and Mg  -Was having persistent fevers 102  F and leukocytosis so she was initially started on IV clindamycin and then changed to IV vancomycin and IV cefepime on 6/28.  Blood cultures remain negative.  Urine culture growing Pseudomonas.  Sputum culture 6/26 is now growing Klebsiella oxytoca that is resistant to Zosyn, antibiotics were changed to IV meropenem 1 g every 8 hours while awaiting further sensitivities, culture did come back sensitive to cefepime.  Continue meropenem for now until clinically improved.  Continues to have intermittent fevers in the 100  F range in the evenings.  WBC rising again so add back IV vancomycin 7/2, pharmD to dose  -Thoracic surgery consulted for chest tube management.  Likely keep chest tube in until extubated.  -Dr. Ovalles previously discussed the case with [facility] on she is not an ECMO candidate due to her chronic respiratory failure.    Paroxysmal atrial fibrillation  SVT: PTA on diltiazem and Eliquis.  -Has been receiving Eliquis during hospitalization  -Was on oral diltiazem and then she developed worsening A-fib with RVR rates up to 160-170s throughout the  day 6/30.  Also,had an alternate SVT with rate 190-200 during the morning.  Diltiazem infusion ineffective.  IV amiodarone load started.  Blood pressure dropping and heart rate remained at 170 so she was cardioverted to sinus tachycardia after cardiology consultation.    -IV amiodarone was continued for 48 hours.  Required second cardioversion this morning due to A-fib with RVR in the 170s, now back in sinus tachycardia with PACs.  -Start oral amiodarone 400 mg twice daily  Addendum: Patient back into A-fib with RVR rates 170s this afternoon.  Cardiology recommended placing back on IV amiodarone 0.5 mg/h and IV digoxin load.  Received 500 mcg IV digoxin.  HR now in 140s up to 150 in afib.  Restart oral diltiazem 60mg q 6 hrs.    -Hold PTA oral diltiazem  -Avoiding beta-blockers due to severe COPD    Hypotension:  Secondary to sedation and cardiac meds.  -phenylephrine as needed, avoiding levophed due to tachyarrhythmias     Possible CAUTI: Hines catheter was removed and urinalysis was obtained 6/28 that shows large blood, large LE, >182 WBCs and RBCs so a UTI is possible, but given the large blood it does make the pyuria difficult to interpret.  Urine culture growing  K Pseudomonas.  -Now on IV meropenem 1 g every 8 hours, continue for now until improved    Hyperkalemia: Potassium bola to 5.5 and persisted in that range for a day or 2.  She received several doses of Lokelma and potassium level has now normalized.  Unclear etiology.  Renal function is normal.  Her PTA losartan has been on hold.    Hypernatremia: Significant rise in sodium now up to 160 with tube feed initiation.  Improved with high-dose free water via NG and D5.  -Now variable sodium with diuretics, but persistently above 150  - free water 230 mL every 4 hours  -Continue D5 at 50 mL/h    Steroid-induced hyperglycemia: Continue medium sliding scale aspart.  Reasonable glycemic control at this time with most blood sugars in the 150  "range.    Lactic acidosis: Likely due to nebulizers and hypoxia.     Anxiety: Holding prior to admission clonazepam.  Continue paroxetine and as needed IV lorazepam.    HTN: Initially had some soft blood pressure, but more recently hypertensive.  -Continue to hold PTA oral diltiazem  -Losartan discontinued with previous hyperkalemia and hypotension    Tobacco dependence: Nicotine replacement.    Chronic anemia: Hemoglobin at baseline of 10-11.    DVT Prophylaxis: Eliquis  Code Status: Full Code  Lines: PICC line, arterial line, PIV, Hines catheter, NG, left-sided chest tube  FEN: Tube feeds, dietitian consulted  Discharge Dispo: TBD  Estimated Disch Date / # of Days until Disch: Continue ICU level cares, anticipate ongoing prolonged hospitalization    Updated spouse Luis and daughter at the bedside    Clinically Significant Risk Factors         # Hypernatremia: Highest Na = 152 mmol/L in last 2 days, will monitor as appropriate  # Hypocalcemia: Lowest Ca = 7.5 mg/dL in last 2 days, will monitor and replace as appropriate     # Hypoalbuminemia: Lowest albumin = 3.3 g/dL at 6/30/2023  1:27 PM, will monitor as appropriate   # Thrombocytopenia: Lowest platelets = 84 in last 2 days, will monitor for bleeding   # Hypertension: Noted on problem list        # Obesity: Estimated body mass index is 31.48 kg/m  as calculated from the following:    Height as of this encounter: 1.6 m (5' 3\").    Weight as of this encounter: 80.6 kg (177 lb 11.1 oz).                       Interval History (Subjective):      Over over 6 L urine output with Lasix drip.  Recurrent fevers overnight again in the 100  F range.  Amiodarone infusion and vecuronium infusion stopped this morning.  She went back into A-fib with RVR with heart rates 170s that was persistent.  Underwent synchronized electrical cardioversion at 120 J and she is now in sinus tachycardia with PACs.                  Physical Exam:      Last Vital Signs:  /84 (BP Location: " "Left leg)   Pulse 120   Temp (!) 100.9  F (38.3  C)   Resp 16   Ht 1.6 m (5' 3\")   Wt 80.6 kg (177 lb 11.1 oz)   SpO2 95%   BMI 31.48 kg/m      Intake/Output Summary (Last 24 hours) at 7/2/2023 1210  Last data filed at 7/2/2023 1200  Gross per 24 hour   Intake 5901.78 ml   Output 4800 ml   Net 1101.78 ml       Constitutional: Intubated and sedated  Eyes: sclera white   HEENT: ET tube  Respiratory: Slight bilateral expiratory wheeze.  No focal crackles  Cardiovascular: Following cardioversion mostly regular rhythm although occasional premature beats.  Tachycardic.  No murmur appreciated  GI: non-tender, not distended, bowel sounds present  Genitourinary: Hines catheter with yellow urine output  Skin: no rash    Musculoskeletal/extremities: 2+ pitting anasarca  Neurologic: Sedated  Psychiatric: calm          Medications:      All current medications were reviewed with changes reflected in problem list.         Data:      All new lab and imaging data were personally reviewed.   Labs:  Recent Labs   Lab 07/02/23  0732 07/02/23  0538 07/02/23  0404 07/01/23  2326 07/01/23  2322 07/01/23  1204 07/01/23  1040 07/01/23  0808 07/01/23  0601 06/30/23  1612 06/30/23  1327   NA  --  152*  --   --   --   --   --   --  151*  --  151*   POTASSIUM  --  3.7  --   --  4.3  --  4.2  --  3.5   < > 4.6   CHLORIDE  --  104  --   --   --   --   --   --  108*  --  102   CO2  --  45*  --   --   --   --   --   --  41*  --  44*   ANIONGAP  --  3*  --   --   --   --   --   --  2*  --  5*   * 121* 122*   < >  --    < >  --    < > 133*   < > 203*   BUN  --  37.0*  --   --   --   --   --   --  41.9*  --  38.7*   CR  --  0.53  --   --   --   --   --   --  0.57  --  0.52   GFRESTIMATED  --  >90  --   --   --   --   --   --  >90  --  >90   EDIN  --  8.0*  --   --   --   --   --   --  7.5*  --  8.6    < > = values in this interval not displayed.     Recent Labs   Lab 07/02/23  0538 07/01/23  1040 07/01/23  0601 06/30/23  0505   WBC 14.2* "  --  10.2 11.2*   HGB 8.0* 8.8* 7.7* 9.7*   HCT 26.7*  --  25.7* 32.1*   *  --  111* 109*   *  --  84* 111*        Recent Labs   Lab 06/28/23  1040 06/28/23  1001 06/26/23 2112 06/26/23  2100 06/26/23 2059   CULTURE No growth after 3 days  No growth after 3 days 50,000-100,000 CFU/mL Pseudomonas aeruginosa* No Growth No Growth 1+ Normal fadi  1+ Klebsiella oxytoca*     Last Arterial Blood Gas:  pH Arterial   Date Value Ref Range Status   07/02/2023 7.42 7.35 - 7.45 Final     pCO2 Arterial   Date Value Ref Range Status   07/02/2023 81 (HH) 35 - 45 mm Hg Final     pO2 Arterial   Date Value Ref Range Status   07/02/2023 71 (L) 80 - 105 mm Hg Final     Bicarbonate Arterial   Date Value Ref Range Status   07/02/2023 52 (HH) 21 - 28 mmol/L Final     Base Excess/Deficit (+/-)   Date Value Ref Range Status   07/02/2023 23.9 (H) -9.0 - 1.8 mmol/L Final     COVID 19 pcr negative    Imaging:   None today    Albin Navarro MD

## 2023-07-02 NOTE — PROVIDER NOTIFICATION
Tele[employer] called and Lakisha NEVES was spoken to regarding patient's K level of 3.7. Patient is on a lasix drip, triple digit UO every 2 hours. Patient only has Mag and Phosph replacement protocols, reaching out to check if we want to replace the patient's K. Will await any new orders.

## 2023-07-03 LAB
ALLEN'S TEST: ABNORMAL
ANION GAP SERPL CALCULATED.3IONS-SCNC: 1 MMOL/L (ref 7–15)
ANION GAP SERPL CALCULATED.3IONS-SCNC: ABNORMAL MMOL/L
ATRIAL RATE - MUSE: 115 BPM
ATRIAL RATE - MUSE: 94 BPM
BACTERIA BLD CULT: NO GROWTH
BACTERIA BLD CULT: NO GROWTH
BASE EXCESS BLDA CALC-SCNC: 30.2 MMOL/L (ref -9–1.8)
BUN SERPL-MCNC: 30.5 MG/DL (ref 8–23)
BUN SERPL-MCNC: 31.4 MG/DL (ref 8–23)
CALCIUM SERPL-MCNC: 7.4 MG/DL (ref 8.6–10)
CALCIUM SERPL-MCNC: 8.1 MG/DL (ref 8.6–10)
CHLORIDE SERPL-SCNC: 85 MMOL/L (ref 98–107)
CHLORIDE SERPL-SCNC: 97 MMOL/L (ref 98–107)
CREAT SERPL-MCNC: 0.41 MG/DL (ref 0.51–0.95)
CREAT SERPL-MCNC: 0.43 MG/DL (ref 0.51–0.95)
DEPRECATED HCO3 PLAS-SCNC: 49 MMOL/L (ref 22–29)
DEPRECATED HCO3 PLAS-SCNC: >50 MMOL/L (ref 22–29)
DIASTOLIC BLOOD PRESSURE - MUSE: NORMAL MMHG
DIASTOLIC BLOOD PRESSURE - MUSE: NORMAL MMHG
ERYTHROCYTE [DISTWIDTH] IN BLOOD BY AUTOMATED COUNT: 14 % (ref 10–15)
GFR SERPL CREATININE-BSD FRML MDRD: >90 ML/MIN/1.73M2
GFR SERPL CREATININE-BSD FRML MDRD: >90 ML/MIN/1.73M2
GLUCOSE BLDC GLUCOMTR-MCNC: 132 MG/DL (ref 70–99)
GLUCOSE BLDC GLUCOMTR-MCNC: 138 MG/DL (ref 70–99)
GLUCOSE BLDC GLUCOMTR-MCNC: 145 MG/DL (ref 70–99)
GLUCOSE BLDC GLUCOMTR-MCNC: 146 MG/DL (ref 70–99)
GLUCOSE BLDC GLUCOMTR-MCNC: 149 MG/DL (ref 70–99)
GLUCOSE BLDC GLUCOMTR-MCNC: 181 MG/DL (ref 70–99)
GLUCOSE BLDC GLUCOMTR-MCNC: 186 MG/DL (ref 70–99)
GLUCOSE SERPL-MCNC: 124 MG/DL (ref 70–99)
GLUCOSE SERPL-MCNC: 200 MG/DL (ref 70–99)
HCO3 BLD-SCNC: 59 MMOL/L (ref 21–28)
HCT VFR BLD AUTO: 25.1 % (ref 35–47)
HGB BLD-MCNC: 7.6 G/DL (ref 11.7–15.7)
INTERPRETATION ECG - MUSE: NORMAL
INTERPRETATION ECG - MUSE: NORMAL
MAGNESIUM SERPL-MCNC: 1.8 MG/DL (ref 1.7–2.3)
MAGNESIUM SERPL-MCNC: 2.1 MG/DL (ref 1.7–2.3)
MCH RBC QN AUTO: 33.5 PG (ref 26.5–33)
MCHC RBC AUTO-ENTMCNC: 30.3 G/DL (ref 31.5–36.5)
MCV RBC AUTO: 111 FL (ref 78–100)
O2/TOTAL GAS SETTING VFR VENT: 40 %
P AXIS - MUSE: 58 DEGREES
P AXIS - MUSE: NORMAL DEGREES
PCO2 BLD: 84 MM HG (ref 35–45)
PH BLD: 7.45 [PH] (ref 7.35–7.45)
PHOSPHATE SERPL-MCNC: 2.2 MG/DL (ref 2.5–4.5)
PHOSPHATE SERPL-MCNC: 2.9 MG/DL (ref 2.5–4.5)
PLATELET # BLD AUTO: 91 10E3/UL (ref 150–450)
PO2 BLD: 54 MM HG (ref 80–105)
POTASSIUM SERPL-SCNC: 3.5 MMOL/L (ref 3.4–5.3)
POTASSIUM SERPL-SCNC: 3.8 MMOL/L (ref 3.4–5.3)
PR INTERVAL - MUSE: 122 MS
PR INTERVAL - MUSE: NORMAL MS
QRS DURATION - MUSE: 68 MS
QRS DURATION - MUSE: 70 MS
QT - MUSE: 250 MS
QT - MUSE: 296 MS
QTC - MUSE: 393 MS
QTC - MUSE: 409 MS
R AXIS - MUSE: 57 DEGREES
R AXIS - MUSE: 65 DEGREES
RBC # BLD AUTO: 2.27 10E6/UL (ref 3.8–5.2)
SODIUM SERPL-SCNC: 141 MMOL/L (ref 136–145)
SODIUM SERPL-SCNC: 147 MMOL/L (ref 136–145)
SYSTOLIC BLOOD PRESSURE - MUSE: NORMAL MMHG
SYSTOLIC BLOOD PRESSURE - MUSE: NORMAL MMHG
T AXIS - MUSE: 247 DEGREES
T AXIS - MUSE: 50 DEGREES
VENTRICULAR RATE- MUSE: 115 BPM
VENTRICULAR RATE- MUSE: 149 BPM
WBC # BLD AUTO: 13.3 10E3/UL (ref 4–11)

## 2023-07-03 PROCEDURE — 94640 AIRWAY INHALATION TREATMENT: CPT | Mod: 76

## 2023-07-03 PROCEDURE — 250N000011 HC RX IP 250 OP 636: Performed by: SURGERY

## 2023-07-03 PROCEDURE — 94640 AIRWAY INHALATION TREATMENT: CPT

## 2023-07-03 PROCEDURE — 99233 SBSQ HOSP IP/OBS HIGH 50: CPT | Performed by: INTERNAL MEDICINE

## 2023-07-03 PROCEDURE — 258N000003 HC RX IP 258 OP 636: Performed by: ANESTHESIOLOGY

## 2023-07-03 PROCEDURE — 250N000011 HC RX IP 250 OP 636: Performed by: ANESTHESIOLOGY

## 2023-07-03 PROCEDURE — 94003 VENT MGMT INPAT SUBQ DAY: CPT

## 2023-07-03 PROCEDURE — 250N000011 HC RX IP 250 OP 636: Mod: JZ | Performed by: ANESTHESIOLOGY

## 2023-07-03 PROCEDURE — 250N000013 HC RX MED GY IP 250 OP 250 PS 637: Performed by: HOSPITALIST

## 2023-07-03 PROCEDURE — 83735 ASSAY OF MAGNESIUM: CPT | Performed by: INTERNAL MEDICINE

## 2023-07-03 PROCEDURE — 999N000157 HC STATISTIC RCP TIME EA 10 MIN

## 2023-07-03 PROCEDURE — 250N000013 HC RX MED GY IP 250 OP 250 PS 637: Performed by: INTERNAL MEDICINE

## 2023-07-03 PROCEDURE — 84100 ASSAY OF PHOSPHORUS: CPT | Performed by: INTERNAL MEDICINE

## 2023-07-03 PROCEDURE — 250N000011 HC RX IP 250 OP 636: Performed by: INTERNAL MEDICINE

## 2023-07-03 PROCEDURE — 200N000001 HC R&B ICU

## 2023-07-03 PROCEDURE — 250N000009 HC RX 250: Performed by: INTERNAL MEDICINE

## 2023-07-03 PROCEDURE — 84132 ASSAY OF SERUM POTASSIUM: CPT | Performed by: INTERNAL MEDICINE

## 2023-07-03 PROCEDURE — 250N000011 HC RX IP 250 OP 636: Mod: JZ | Performed by: INTERNAL MEDICINE

## 2023-07-03 PROCEDURE — 258N000003 HC RX IP 258 OP 636: Performed by: SURGERY

## 2023-07-03 PROCEDURE — 250N000013 HC RX MED GY IP 250 OP 250 PS 637: Performed by: SURGERY

## 2023-07-03 PROCEDURE — 999N000253 HC STATISTIC WEANING TRIALS

## 2023-07-03 PROCEDURE — 82803 BLOOD GASES ANY COMBINATION: CPT | Performed by: SURGERY

## 2023-07-03 PROCEDURE — 99291 CRITICAL CARE FIRST HOUR: CPT | Performed by: SURGERY

## 2023-07-03 PROCEDURE — 85027 COMPLETE CBC AUTOMATED: CPT | Performed by: INTERNAL MEDICINE

## 2023-07-03 PROCEDURE — 80048 BASIC METABOLIC PNL TOTAL CA: CPT | Performed by: SURGERY

## 2023-07-03 PROCEDURE — 250N000011 HC RX IP 250 OP 636: Mod: JZ | Performed by: SURGERY

## 2023-07-03 RX ORDER — CEFEPIME HYDROCHLORIDE 2 G/1
2 INJECTION, POWDER, FOR SOLUTION INTRAVENOUS EVERY 8 HOURS
Status: COMPLETED | OUTPATIENT
Start: 2023-07-03 | End: 2023-07-06

## 2023-07-03 RX ORDER — AMIODARONE HYDROCHLORIDE 200 MG/1
200 TABLET ORAL DAILY
Status: DISCONTINUED | OUTPATIENT
Start: 2023-07-12 | End: 2023-07-06

## 2023-07-03 RX ORDER — AMIODARONE HYDROCHLORIDE 200 MG/1
200 TABLET ORAL DAILY
Status: DISCONTINUED | OUTPATIENT
Start: 2023-07-13 | End: 2023-07-03

## 2023-07-03 RX ORDER — MAGNESIUM SULFATE HEPTAHYDRATE 40 MG/ML
2 INJECTION, SOLUTION INTRAVENOUS ONCE
Status: COMPLETED | OUTPATIENT
Start: 2023-07-03 | End: 2023-07-03

## 2023-07-03 RX ORDER — AMIODARONE HYDROCHLORIDE 200 MG/1
200 TABLET ORAL 3 TIMES DAILY
Status: DISCONTINUED | OUTPATIENT
Start: 2023-07-03 | End: 2023-07-03

## 2023-07-03 RX ORDER — POTASSIUM CHLORIDE 29.8 MG/ML
20 INJECTION INTRAVENOUS
Status: COMPLETED | OUTPATIENT
Start: 2023-07-03 | End: 2023-07-03

## 2023-07-03 RX ORDER — POTASSIUM CHLORIDE 20MEQ/15ML
20 LIQUID (ML) ORAL ONCE
Status: COMPLETED | OUTPATIENT
Start: 2023-07-03 | End: 2023-07-03

## 2023-07-03 RX ORDER — METOLAZONE 5 MG/1
10 TABLET ORAL ONCE
Status: COMPLETED | OUTPATIENT
Start: 2023-07-03 | End: 2023-07-03

## 2023-07-03 RX ORDER — DILTIAZEM HYDROCHLORIDE 30 MG/1
90 TABLET, FILM COATED ORAL EVERY 6 HOURS SCHEDULED
Status: DISCONTINUED | OUTPATIENT
Start: 2023-07-03 | End: 2023-07-08 | Stop reason: ALTCHOICE

## 2023-07-03 RX ORDER — AMIODARONE HYDROCHLORIDE 200 MG/1
400 TABLET ORAL 2 TIMES DAILY
Status: DISCONTINUED | OUTPATIENT
Start: 2023-07-03 | End: 2023-07-03

## 2023-07-03 RX ORDER — POTASSIUM CHLORIDE 29.8 MG/ML
20 INJECTION INTRAVENOUS ONCE
Status: COMPLETED | OUTPATIENT
Start: 2023-07-03 | End: 2023-07-03

## 2023-07-03 RX ORDER — AMIODARONE HYDROCHLORIDE 200 MG/1
400 TABLET ORAL 2 TIMES DAILY
Status: DISCONTINUED | OUTPATIENT
Start: 2023-07-03 | End: 2023-07-06

## 2023-07-03 RX ADMIN — INSULIN ASPART 1 UNITS: 100 INJECTION, SOLUTION INTRAVENOUS; SUBCUTANEOUS at 17:23

## 2023-07-03 RX ADMIN — INSULIN ASPART 1 UNITS: 100 INJECTION, SOLUTION INTRAVENOUS; SUBCUTANEOUS at 08:59

## 2023-07-03 RX ADMIN — DILTIAZEM HYDROCHLORIDE 90 MG: 30 TABLET, FILM COATED ORAL at 11:12

## 2023-07-03 RX ADMIN — MAGNESIUM SULFATE HEPTAHYDRATE 3 G: 500 INJECTION, SOLUTION INTRAMUSCULAR; INTRAVENOUS at 16:55

## 2023-07-03 RX ADMIN — MEROPENEM 1 G: 1 INJECTION, POWDER, FOR SOLUTION INTRAVENOUS at 05:31

## 2023-07-03 RX ADMIN — PAROXETINE 20 MG: 10 SUSPENSION ORAL at 09:09

## 2023-07-03 RX ADMIN — IPRATROPIUM BROMIDE 0.5 MG: 0.5 SOLUTION RESPIRATORY (INHALATION) at 11:37

## 2023-07-03 RX ADMIN — POTASSIUM CHLORIDE 20 MEQ: 29.8 INJECTION, SOLUTION INTRAVENOUS at 16:55

## 2023-07-03 RX ADMIN — DILTIAZEM HYDROCHLORIDE 90 MG: 30 TABLET, FILM COATED ORAL at 17:40

## 2023-07-03 RX ADMIN — METHYLPREDNISOLONE SODIUM SUCCINATE 40 MG: 40 INJECTION, POWDER, FOR SOLUTION INTRAMUSCULAR; INTRAVENOUS at 08:57

## 2023-07-03 RX ADMIN — MAGNESIUM SULFATE HEPTAHYDRATE 2 G: 2 INJECTION, SOLUTION INTRAVENOUS at 02:51

## 2023-07-03 RX ADMIN — LEVALBUTEROL HYDROCHLORIDE 0.63 MG: 0.63 SOLUTION RESPIRATORY (INHALATION) at 15:20

## 2023-07-03 RX ADMIN — METOLAZONE 10 MG: 5 TABLET ORAL at 09:52

## 2023-07-03 RX ADMIN — APIXABAN 5 MG: 5 TABLET, FILM COATED ORAL at 21:46

## 2023-07-03 RX ADMIN — Medication 40 MG: at 09:09

## 2023-07-03 RX ADMIN — NICOTINE 1 PATCH: 14 PATCH, EXTENDED RELEASE TRANSDERMAL at 08:55

## 2023-07-03 RX ADMIN — FUROSEMIDE 5 MG/HR: 10 INJECTION, SOLUTION INTRAMUSCULAR; INTRAVENOUS at 06:45

## 2023-07-03 RX ADMIN — LEVOTHYROXINE SODIUM 112 MCG: 0.11 TABLET ORAL at 08:56

## 2023-07-03 RX ADMIN — POTASSIUM CHLORIDE 20 MEQ: 20 SOLUTION ORAL at 17:27

## 2023-07-03 RX ADMIN — POTASSIUM CHLORIDE 20 MEQ: 29.8 INJECTION, SOLUTION INTRAVENOUS at 19:24

## 2023-07-03 RX ADMIN — IPRATROPIUM BROMIDE 0.5 MG: 0.5 SOLUTION RESPIRATORY (INHALATION) at 07:19

## 2023-07-03 RX ADMIN — AMIODARONE HYDROCHLORIDE 400 MG: 200 TABLET ORAL at 08:56

## 2023-07-03 RX ADMIN — LEVALBUTEROL HYDROCHLORIDE 0.63 MG: 0.63 SOLUTION RESPIRATORY (INHALATION) at 07:19

## 2023-07-03 RX ADMIN — Medication 15 ML: at 08:56

## 2023-07-03 RX ADMIN — INSULIN ASPART 1 UNITS: 100 INJECTION, SOLUTION INTRAVENOUS; SUBCUTANEOUS at 12:07

## 2023-07-03 RX ADMIN — CEFEPIME 2 G: 2 INJECTION, POWDER, FOR SOLUTION INTRAVENOUS at 18:56

## 2023-07-03 RX ADMIN — POTASSIUM & SODIUM PHOSPHATES POWDER PACK 280-160-250 MG 1 PACKET: 280-160-250 PACK at 03:52

## 2023-07-03 RX ADMIN — POTASSIUM & SODIUM PHOSPHATES POWDER PACK 280-160-250 MG 1 PACKET: 280-160-250 PACK at 11:12

## 2023-07-03 RX ADMIN — APIXABAN 5 MG: 5 TABLET, FILM COATED ORAL at 08:56

## 2023-07-03 RX ADMIN — IPRATROPIUM BROMIDE 0.5 MG: 0.5 SOLUTION RESPIRATORY (INHALATION) at 15:20

## 2023-07-03 RX ADMIN — POTASSIUM & SODIUM PHOSPHATES POWDER PACK 280-160-250 MG 1 PACKET: 280-160-250 PACK at 06:44

## 2023-07-03 RX ADMIN — INSULIN ASPART 1 UNITS: 100 INJECTION, SOLUTION INTRAVENOUS; SUBCUTANEOUS at 20:05

## 2023-07-03 RX ADMIN — ATORVASTATIN CALCIUM 20 MG: 20 TABLET, FILM COATED ORAL at 08:56

## 2023-07-03 RX ADMIN — DILTIAZEM HYDROCHLORIDE 60 MG: 30 TABLET, FILM COATED ORAL at 05:29

## 2023-07-03 RX ADMIN — AMIODARONE HYDROCHLORIDE 400 MG: 200 TABLET ORAL at 21:51

## 2023-07-03 RX ADMIN — LEVALBUTEROL HYDROCHLORIDE 0.63 MG: 0.63 SOLUTION RESPIRATORY (INHALATION) at 20:08

## 2023-07-03 RX ADMIN — IPRATROPIUM BROMIDE 0.5 MG: 0.5 SOLUTION RESPIRATORY (INHALATION) at 20:08

## 2023-07-03 RX ADMIN — MIDAZOLAM HYDROCHLORIDE 8 MG/HR: 1 INJECTION, SOLUTION INTRAVENOUS at 11:22

## 2023-07-03 RX ADMIN — CEFEPIME 2 G: 2 INJECTION, POWDER, FOR SOLUTION INTRAVENOUS at 11:12

## 2023-07-03 RX ADMIN — Medication 100 MCG/HR: at 05:44

## 2023-07-03 RX ADMIN — POTASSIUM CHLORIDE 20 MEQ: 29.8 INJECTION, SOLUTION INTRAVENOUS at 02:59

## 2023-07-03 RX ADMIN — LEVALBUTEROL HYDROCHLORIDE 0.63 MG: 0.63 SOLUTION RESPIRATORY (INHALATION) at 11:36

## 2023-07-03 ASSESSMENT — ACTIVITIES OF DAILY LIVING (ADL)
ADLS_ACUITY_SCORE: 36

## 2023-07-03 NOTE — PROGRESS NOTES
Tele ICU note:    Notified about k 3.5, patient previously with hyperkalemia.     Ordered 20 mEq once.    Kain Bush M.D.  Pulmonary & Critical Care  Pager: Click Here to page

## 2023-07-03 NOTE — PROGRESS NOTES
[facility] ICU VENTILATOR RESPIRATORY NOTE  Date of Admission: 06/20/23  Date of Intubation (most recent): 06/21/23  Reason for Mechanical Ventilation: Respiratory failure  Number of Days on Mechanical Ventilation: 13  Met Criteria for Pressure Support Trial: Yes  Length of Pressure Support Trial: no vent wean during the day per MD.     Recent lab on current vent settings are as noted below:   Latest Reference Range & Units 07/03/23 09:50   pH Arterial 7.35 - 7.45  7.45   pCO2 Arterial 35 - 45 mm Hg 84 (HH)   PO2 Arterial 80 - 105 mm Hg 54 (L)   Bicarbonate Arterial 21 - 28 mmol/L 59 (HH)   Base Excess Art -9.0 - 1.8 mmol/L 30.2 (H)   FIO2  40     Vent Mode: CMV/AC  (Continuous Mandatory Ventilation/ Assist Control)  FiO2 (%): (S) 45 %  Resp Rate (Set): 16 breaths/min  Tidal Volume (Set, mL): 310 mL  PEEP (cm H2O): 5 cmH2O  Pressure Support (cm H2O): (S) 10 cmH2O  Resp: 21    BS-diminish/course. Suctioned via ETT for small thick white secretion.  Received nebs as order.       *Addedum*.  1624: RR increased to 18 from 16 per order.

## 2023-07-03 NOTE — PHARMACY-VANCOMYCIN DOSING SERVICE
Pharmacy Vancomycin Initial Note  Date of Service 2023  Patient's  1965  57 year old, female    Indication: Sepsis    Current estimated CrCl = Estimated Creatinine Clearance: 124.3 mL/min (A) (based on SCr of 0.49 mg/dL (L)).    Creatinine for last 3 days  2023:  5:05 AM Creatinine 0.47 mg/dL;  1:27 PM Creatinine 0.52 mg/dL  2023:  6:01 AM Creatinine 0.57 mg/dL  2023:  5:38 AM Creatinine 0.53 mg/dL;  1:50 PM Creatinine 0.49 mg/dL    Recent Vancomycin Level(s) for last 3 days  No results found for requested labs within last 3 days.      Vancomycin IV Administrations (past 72 hours)                   vancomycin (VANCOCIN) 1,250 mg in 0.9% NaCl 250 mL intermittent infusion (mg) 1,250 mg New Bag 23 1137                Nephrotoxins and other renal medications (From now, onward)    Start     Dose/Rate Route Frequency Ordered Stop    23 2300  vancomycin (VANCOCIN) 1,500 mg in 0.9% NaCl 250 mL intermittent infusion         1,500 mg  over 90 Minutes Intravenous EVERY 12 HOURS 23 2235      23 1330  furosemide (LASIX) 100 mg in sodium chloride 0.9 % 100 mL infusion         5 mg/hr  5 mL/hr  Intravenous CONTINUOUS 23 1315            Contrast Orders - past 72 hours (72h ago, onward)    None          [employer] Prediction of Planned Initial Vancomycin Regimen  Loading dose: N/A  Regimen: 1500 mg IV every 12 hours.  Start time: 23:00 on 2023  Exposure target: AUC24 (range)400-600 mg/L.hr   AUC24,ss: 551 mg/L.hr  Probability of AUC24 > 400: 95 %  Ctrough,ss: 16.1 mg/L  Probability of Ctrough,ss > 20: 25 %  Probability of nephrotoxicity (Lodise ZOHAIB ): 11 %        Plan:  1. Start vancomycin  1500 mg IV q12h.   2. Vancomycin monitoring method: AUC  3. Vancomycin therapeutic monitoring goal: 400-600 mg*h/L  4. Pharmacy will check vancomycin levels as appropriate in 1-3 Days.    5. Serum creatinine levels will be ordered daily for the first week of therapy and at least  twice weekly for subsequent weeks.      Celsa Rice RP

## 2023-07-03 NOTE — PROGRESS NOTES
[facility]  Hospitalist Progress Note  Albin Navarro MD 07/3/23    Reason for Stay (Diagnosis): Respiratory failure, COPD exacerbation, pneumothorax         Assessment and Plan:      Summary of Stay:   Lara Melendez is a 57 year old female with PMH including COPD, emphysema, as needed 2-3 L O2 at home, tobacco dependence, hypertension, anxiety, paroxysmal atrial fibrillation on Eliquis, hypothyroidism, psoriasis, and hyperlipidemia who presented on 6/21 with worsening shortness of breath and wheezing.  Symptom onset was a few days prior to presentation and consisting of wheezing so her pulmonologist prescribed azithromycin and prednisone.  She took about 3 days of these medications.  Due to continued decline she called EMS and was brought to the ED for evaluation.     In the emergency department she had a clear COPD exacerbation so was given IV steroids, IV magnesium, multiple nebulizers and placed on BiPAP.  Imaging showed no clear evidence of pneumonia but she remained on azithromycin.  She was admitted to the ICU.     The day following admission her respiratory condition deteriorated to the point that she required intubation and mechanical ventilation.  She had significant bronchospasm and high airway pressures so required deep sedation with fentanyl, ketamine and propofol with addition of vecuronium.  She was given a continuous albuterol nebulizer with not much improvement.  Pulmonology was consulted as well.  The case was even discussed with [facility] and she is not an ECMO candidate.  Repeat x-ray 6/26 showed a left-sided pneumothorax and IR placed a chest tube, thoracic surgery was consulted.  Worsening hypernatremia with tube feeds, free water increased and D5 given with improvement.  She developed a fever to 102  F and she was started on IV clindamycin 6/26.  Persistent fevers and new leukocytosis antibiotics broadened to vancomycin and cefepime.  Blood cultures negative.  Sputum culture  growing Klebsiella oxytoca resistant to IV Zosyn, no cefepime results so changed to IV meropenem given ongoing fevers.  Vancomycin discontinued with negative MRSA screen.  Urine culture growing Pseudomonas.  She was able to wean off vecuronium and ketamine for 48 hours.  Aggressive IV diuresis has continued, now on Lasix drip    Acutely worsening 6/30 with A-fib with RVR and an alternate SVT not responsive to IV diltiazem or initial IV amiodarone load.  Cardioverted due to soft blood pressure and persistent rates of 170 and she converted to sinus tachycardia but still has frequent very short-lived runs of SVT lasting a few seconds.  She was continued on IV amiodarone loading.  Vent dyssynchrony during the day 6/30 and guppy breathing she was back under deep sedation with propofol, fentanyl, Versed, and vecuronium. Phenylephrine infusion added for hypotension.  Required electrical cardioversion again on 7/2.  Received digoxin load, now discontinued.  Dysrhythmias improved more recently and IV amiodarone converted to oral amiodarone and also on oral diltiazem.  Now off vecuronium and sedation weaned some.  IV meropenem narrowed to IV cefepime based on sputum and urine culture sensitivities.    Problem List/Assessment and Plan:   Acute on chronic  hypoxemic and hypercapnic respiratory failure  COPD with acute exacerbation  Left-sided pneumothorax  Pneumonia secondary to Klebsiella oxytoca: At baseline she is on chronic oxygen of 2 to 3 L by nasal cannula as needed.  She has had several admissions for severe COPD requiring BiPAP, although has not previously been intubated.  Initially during this hospitalization she was on BiPAP but she quickly deteriorated so was intubated 6/21.  She developed high airway pressures and bronchospasm so required deep sedation with fentanyl, ketamine, and propofol.  Vecuronium was added due to persistent high peak pressures. Continuous albuterol nebulizer for 24 hours provided no benefit  and even seem to increase bronchospasm and has been discontinued.    Chest x-ray 6/26 showed a left-sided pneumothorax likely barotrauma in setting of emphysema and high airway pressures due to severe COPD exacerbation.  Chest tube was placed by IR.  Finished a 5-day course of azithromycin earlier.  -Intensivist to manage ventilator  -Pulmonology consulted  -Currently using low tidal volume strategy given high ventilator pressures resulting in pneumothorax.  Due to this she does have increasing CO2 retention, but is for the most part compensating with a pH of 7.3, PCO2 90s, and peak HCO3 of 45-50.  Respiratory rate mildly increased, but overall allowing a compensated respiratory acidosis.  -Continue IV methylprednisolone 40 mg twice daily   -Receiving scheduled Xopenex and Atrovent nebs.   -Sedation with IV fentanyl, propofol, Versed, weaning sedation  -No longer on vecuronium  -Continue with aggressive IV diuresis.  Received metolazone today with good effect.  Also, on IV Lasix drip at 5 mg/h.  Aggressive potassium magnesium replacement as well.  -Sputum culture 6/26 is now growing Klebsiella oxytoca that is resistant to Zosyn, so she was put on IV meropenem while awaiting sensitivities and the Klebsiella is indeed sensitive to cefepime so we have switched back to this.  Has been on appropriate antibiotics for the positive culture since 6/28  -Thoracic surgery consulted for chest tube management.  Likely keep chest tube in until extubated.  -Now on day 12 of intubation.  Likely need to have further discussions with family regarding trach and prolonged vent weaning.  Previously briefly discussed this with the patient's spouse and daughter and it seemed like they would be interested in pursuing this route if necessary as the patient has never expressed any wish to limit any medical cares  -Dr. Ovalles previously discussed the case with [facility] on she is not an ECMO candidate due to her chronic respiratory  failure.    Paroxysmal atrial fibrillation  SVT: PTA on diltiazem and Eliquis.  -Cardiology consulted  -Has been receiving Eliquis during hospitalization  -Developed issues with SVT rates in the 170s and A-fib with RVR refractory to diltiazem drip.  Received IV amiodarone load and required electrical cardioversion on 7/1 and 7/2.  Also received digoxin load that has now been discontinued.  -Change IV amiodarone to oral amiodarone per cardiology recommendations  -Increase oral diltiazem to 90 mg every 6 hours  -Avoiding beta-blockers due to severe COPD    Hypotension:  Secondary to sedation and cardiac meds.  -phenylephrine as needed, avoiding levophed due to tachyarrhythmias     Possible CAUTI: Hines catheter was removed and urinalysis was obtained 6/28 that shows large blood, large LE, >182 WBCs and RBCs so a UTI is possible, but given the large blood it does make the pyuria difficult to interpret.  Urine culture growing  K Pseudomonas.  -Can change IV meropenem back to IV cefepime.  On IV antibiotics covering this since 6/28    Hyperkalemia, resolved: Potassium bola to 5.5 and persisted in that range for a day or 2.  She received several doses of Lokelma and potassium level has now normalized.  Unclear etiology.  Renal function is normal.  Her PTA losartan has been on hold.    Hypernatremia: Significant rise in sodium now up to 160 with tube feed initiation.  Improved with high-dose free water via NG and D5.  -Received metolazone today and sodium levels 141.  Stop D5.  -free water 230 mL every 4 hours    Steroid-induced hyperglycemia: Continue medium sliding scale aspart.  Reasonable glycemic control at this time with most blood sugars in the 150 range.    Lactic acidosis: Likely due to nebulizers and hypoxia.     Anxiety: Holding prior to admission clonazepam.  Continue paroxetine and as needed IV lorazepam.    HTN: Initially had some soft blood pressure, but more recently hypertensive.  -Continue to hold  "PTA oral diltiazem  -Losartan discontinued with previous hyperkalemia and hypotension    Tobacco dependence: Nicotine replacement.    Chronic anemia: Hemoglobin at baseline of 10-11.    DVT Prophylaxis: Eliquis  Code Status: Full Code  Lines: ET tube, PICC line, arterial line, PIV, Hines catheter, NG, left-sided chest tube  FEN: Tube feeds, dietitian consulted  Discharge Dispo: TBD  Estimated Disch Date / # of Days until Disch: Continue ICU level cares, anticipate ongoing prolonged hospitalization    Updated spouse Luis and daughter at the bedside    Clinically Significant Risk Factors         # Hypernatremia: Highest Na = 152 mmol/L in last 2 days, will monitor as appropriate      # Hypoalbuminemia: Lowest albumin = 3.3 g/dL at 6/30/2023  1:27 PM, will monitor as appropriate   # Thrombocytopenia: Lowest platelets = 91 in last 2 days, will monitor for bleeding   # Hypertension: Noted on problem list        # Overweight: Estimated body mass index is 29.72 kg/m  as calculated from the following:    Height as of this encounter: 1.6 m (5' 3\").    Weight as of this encounter: 76.1 kg (167 lb 12.3 oz).                       Interval History (Subjective):      Mostly in sinus tachycardia with PVCs overnight.  5.1 L urine output with Lasix drip in the last 24 hours.  Temperatures in the 99  F range this afternoon.                  Physical Exam:      Last Vital Signs:  BP (!) 147/52   Pulse 101   Temp 99.3  F (37.4  C)   Resp 21   Ht 1.6 m (5' 3\")   Wt 76.1 kg (167 lb 12.3 oz)   SpO2 97%   BMI 29.72 kg/m      Intake/Output Summary (Last 24 hours) at 7/3/2023 1726  Last data filed at 7/3/2023 1500  Gross per 24 hour   Intake 3988.21 ml   Output 5720 ml   Net -1731.79 ml       Constitutional: Intubated and sedated  Eyes: sclera white   HEENT: ET tube  Respiratory: No expiratory wheeze this morning, no focal crackles anteriorly  Cardiovascular: Slight regular tachycardia with some premature beats, no murmur " appreciated  GI: non-tender, not distended, bowel sounds present  Genitourinary: Hines catheter with yellow urine output  Skin: no rash    Musculoskeletal/extremities: 2-3+ pitting anasarca  Neurologic: Sedated  Psychiatric: calm          Medications:      All current medications were reviewed with changes reflected in problem list.         Data:      All new lab and imaging data were personally reviewed.   Labs:  Recent Labs   Lab 07/03/23  1210 07/03/23  1149 07/03/23  0819 07/03/23  0413 07/03/23  0200 07/02/23  1635 07/02/23  1350 07/02/23  0732 07/02/23  0538     --   --   --  147*  --  149*  --  152*   POTASSIUM 3.8  --   --   --  3.5  --  4.6  --  3.7   CHLORIDE 85*  --   --   --  97*  --  98  --  104   CO2 >50*  --   --   --  49*  --  48*  --  45*   ANIONGAP  --   --   --   --  1*  --  3*  --  3*   * 186* 146*   < > 124*   < > 224*   < > 121*   BUN 31.4*  --   --   --  30.5*  --  37.3*  --  37.0*   CR 0.43*  --   --   --  0.41*  --  0.49*  --  0.53   GFRESTIMATED >90  --   --   --  >90  --  >90  --  >90   EDIN 8.1*  --   --   --  7.4*  --  8.3*  --  8.0*    < > = values in this interval not displayed.     Recent Labs   Lab 07/03/23  0200 07/02/23  0538 07/01/23  1040 07/01/23  0601   WBC 13.3* 14.2*  --  10.2   HGB 7.6* 8.0* 8.8* 7.7*   HCT 25.1* 26.7*  --  25.7*   * 111*  --  111*   PLT 91* 106*  --  84*        Recent Labs   Lab 06/28/23  1040 06/28/23  1001 06/26/23 2112 06/26/23  2100 06/26/23 2059   CULTURE No Growth  No Growth 50,000-100,000 CFU/mL Pseudomonas aeruginosa* No Growth No Growth 1+ Normal fadi  1+ Klebsiella oxytoca*     Last Arterial Blood Gas:  pH Arterial   Date Value Ref Range Status   07/03/2023 7.45 7.35 - 7.45 Final     pCO2 Arterial   Date Value Ref Range Status   07/03/2023 84 (HH) 35 - 45 mm Hg Final     pO2 Arterial   Date Value Ref Range Status   07/03/2023 54 (L) 80 - 105 mm Hg Final     Bicarbonate Arterial   Date Value Ref Range Status   07/03/2023 59  (HH) 21 - 28 mmol/L Final     Base Excess/Deficit (+/-)   Date Value Ref Range Status   07/03/2023 30.2 (H) -9.0 - 1.8 mmol/L Final     Comment:     BASE EXCESS OUT OF RANGE - VALUE ABOVE IS WHAT ANALYZER MEASURED           Imaging:   None today    Albin Navarro MD

## 2023-07-03 NOTE — PLAN OF CARE
Goal Outcome Evaluation:      Plan of Care Reviewed With: child    Overall Patient Progress: no changeOverall Patient Progress: no change       ICU End of Shift Summary.  For vital signs and complete assessments, please see documentation flowsheets.     Pertinent assessments: Remains on mechanical ventilation and maintaining sats>90%. Suctioned for moderate yellow secretions. Bronchodilators continued. CT to wall suction with minimal serous output, no air leak or crepitus noted. Neurologically sedated with midazolam, titrated down some. Fentanyl effective for pain. Rhythm atrial, CVR/RVR and T wave inversion, on CCB and amio gtt transitioned  to enteral. MAP at goal off vasopressor. Has a low grade fever, ABX continued. Diuresing well on lasix gtt. Lytes replete per protocol. TF continued, tolerating well. Family updated at bedside.   Major Shift Events: As outlined above  Plan (Upcoming Events): Increase TF to new goal @ 2200. Trend electrolytes  Discharge/Transfer Needs: TBD    Bedside Shift Report Completed : y   Bedside Safety Check Completed: y     Notified provider about indwelling el catheter discussed removal or continued need.    Did provider choose to remove indwelling el catheter? NO    Provider's el indication for keeping indwelling el catheter: Indication for continued use: Deep Sedation/Paralysis    Is there an order for indwelling el catheter? YES

## 2023-07-03 NOTE — PROVIDER NOTIFICATION
[employer] called and Lakisha NEVES was spoken to regarding patient's HR/Rhythm. Patient is having an increase in ectopy over the last hour- frequent PAC's, couplet/triplet PVC's, HR fluctuating between 80's to 130's. Wondering if we can check electrolytes since patient UO is triple digits every hour while on lasix drip.

## 2023-07-03 NOTE — PROGRESS NOTES
Critical Care  Note      07/03/2023    Name: Lara Melendez MRN#: [id number]   Age: 57 year old YOB: 1965     Hsptl Day# 12  ICU DAY # 12    MV DAY # 12             Problem List:   Principal Problem:    Paroxysmal atrial fibrillation with RVR (H)  Active Problems:    COPD exacerbation (H)    Anticoagulated    Acute and chronic respiratory failure with hypercapnia (H)           Summary/Hospital Course:   57-year-old female with history of COPD, emphysema, O2 dependent at home, tobacco dependence, hypertension, anxiety, atrial fibrillation on Eliquis, hypothyroidism presents with worsening shortness of breath and wheezing.  She appears to be in status asthmaticus versus worsening COPD exacerbation.  Patient was prescribed azithromycin and prednisone prior to admission.  She continued decline was brought to EMS for evaluation.  Where she was given IV steroids, IV magnesium nebulizers and BiPAP.  After admission to the ICU she failed a BiPAP trial and required intubation mechanical ventilation.  She had significant bronchospasm with high airway pressures requiring deep sedation with fentanyl ketamine and propofol and addition of a paralytic.  Events of last 24 hours noted for episodes of RVR with associated atrial fibrillation and SVT.  Patient was cardioverted back into sinus rhythm with frequent PACs. .  She was weaned off vecuronium.  She did have an episode of A-fib with RVR with relative soft pressures for which she underwent a cardioversion which converted her to atrial fib with rate control.  We are weaning her propofol actively and phenylephrine also.    Assessment and plan :     Lara Melendez IS a 57 year old female admitted on 6/20/2023 for, acute respiratory failure secondary to COPD exacerbation, small left-sided pneumothorax and history of atrial fibrillation on Eliquis..   I have personally reviewed the daily labs, imaging studies, cultures and discussed the case with referring physician  and consulting physicians.     My assessment and plan by system for this patient is as follows:    Neurology/Psychiatry:   1.  Sedation: Continues require deep sedation given her dyssynchrony with the vent  2  Pain Mgt  3.  ICU Delirium     Plan   Remains of paralytic vecuronium.   Continue Versed and propofol for sedation.  RASS goal -2    Cardiovascular:   1.Hemodynamics -normotensive to hypertensive  2.Rhythm sinus with PACs  3. Ischemia -no signs of ischemia  Plan  Appreciate cardiology input, agree with plan to start p.o. amiodarone.  Patient underwent 1 successful cardioversion this morning.  Wean phenylephrine as tolerated.  Hopefully as we wean propofol we should be able to come down on her phenylephrine dosing.    Pulmonary/Ventilator Management:   1. Airway intubated for acute hypoxic and hypercapnic respiratory failure  2. Oxygenation/ventilation/mechanics on full mechanical ventilatory support  Plan  -Overall her respiratory status seems to be slowly improving.  We were able to increase tidal volume and lower her respiratory rate to normal exhalation time yesterday.  Her airway pressures have not significantly increased and her compensated respiratory acidosis has improved.  We will continue her on a tidal volume of 310 with a PEEP of 5.  Oxygenation is adequate at this time.  Wean FiO2 as tolerated.  Continue her on scheduled nebs and steroids.  Continue chest tube until patient is extubated.  If patient does not make any further progress would need to start discussing tracheostomy early this week.    GI and Nutrition :   1.  Continue tube feeds at goal   - Patient does not meet criteria for malnutrition at this time    Plan  -continue enteral feeds.    Renal/Fluids/Electrolytes:   1.  No acute renal injury at this time  2.  Hypernatremia with 1.9L free water defecit  3.  Compensated respiratory acidosis no significant change with vent manipulation  4.  Appears hypervolemic  Plan  - continue 200q4 free  water flush  - thiazide diuretic today to assist with hypernatremia and fluid overload  - monitor function and electrolytes as needed with replacement per ICU protocols. - generally avoid nephrotoxic agents such as NSAID, IV contrast unless specifically required  - adjust medications as needed for renal clearance  - follow I/O's as appropriate.  -We will continue Lasix diuresis.  Patient is tolerating Lasix infusion      Infectious Disease:   1.  Concern for UTI and positive sputum culture.    Plan  -Continue patient on cefepime while awaiting antibiotic sensitivities.  We will stop vancomycin at this time.    Endocrine:   1.  Concern for stress-induced hyperglycemia  2.  Concern for diabetes induced hyperglycemia  3.  Plan  - ICU insulin protocol, goal sugar <180      Hematology/Oncology:   1.  Leukocytosis: improving  2.  Mild anemia most likely secondary to hemodilution  3.  Medication induced coagulopathy: On Eliquis for atrial fibrillation  Plan  -Continue to monitor     IV/Access:   1. Venous access -central access  2. Arterial access -arterial line  3.  Plan  - central access required and necessary      ICU Prophylaxis:   1. DVT: On Eliquis  2. VAP: HOB 30 degrees, chlorhexidine rinse  3. Stress Ulcer: PPI/H2 blocker  4. Restraints: Nonviolent soft two point restraints required and necessary for patient safety and continued cares and good effect as patient continues to pull at necessary lines, tubes despite education and distraction. Will readdress daily.   5. Wound care  -local wound care  6. Feeding -continue tube feeds  7. Family Update:   8. Disposition -ICU        Key goals for next 24 hours:   1.  Continue on current vent settings  2.  Wean sedation as tolerated  3.  Continue to diurese               Medical History:     Past Medical History:   Diagnosis Date     Anxiety      COPD (chronic obstructive pulmonary disease) - 2L home O2      Diverticulitis of colon      Hypercholesterolemia       Hypertension      Hypothyroidism      Paroxysmal atrial fibrillation      Psoriasis      Pulmonary nodule - left upper lobe      Past Surgical History:   Procedure Laterality Date     CHOLECYSTECTOMY       INCISION AND DRAINAGE MANDIBLE, COMBINED Left 01/10/2022    Procedure: INCISION AND DRAINAGE, MANDIBLE;  Surgeon: Jn Feliz DDS;  Location: [facility] OR     INCISION AND DRAINAGE MANDIBLE, COMBINED Left 02/04/2022    Procedure: INCISION AND DRAINAGE, MANDIBLE;  Surgeon: Taylor Ortez DDS;  Location: [facility] OR     TOOTH EXTRACTION       Vocal Cord surgery       Social History     Socioeconomic History     Marital status:      Spouse name: Not on file     Number of children: Not on file     Years of education: Not on file     Highest education level: Not on file   Occupational History     Not on file   Tobacco Use     Smoking status: Never     Smokeless tobacco: Never   Substance and Sexual Activity     Alcohol use: Yes     Comment: occ     Drug use: Never     Sexual activity: Not Currently   Other Topics Concern     Not on file   Social History Narrative     Not on file     Social Determinants of Health     Financial Resource Strain: Not on file   Food Insecurity: Not on file   Transportation Needs: Not on file   Physical Activity: Not on file   Stress: Not on file   Social Connections: Not on file   Intimate Partner Violence: Not on file   Housing Stability: Not on file        Allergies   Allergen Reactions     Sulfa Antibiotics      Doxycycline Other (See Comments)     Develops chest tightness  Intolerance not allergy     Penicillins Rash     Generalized rash  Rash, Generalized                Key Medications:       [START ON 7/12/2023] amiodarone  200 mg Oral Daily     amiodarone  400 mg Oral BID     apixaban ANTICOAGULANT  5 mg Per Feeding Tube BID     atorvastatin  20 mg Per Feeding Tube Daily     ceFEPIme  2 g Intravenous Q8H     diltiazem  90 mg Oral or Feeding Tube Q6H [facility]     insulin aspart  1-6 Units  Subcutaneous Q4H     ipratropium  0.5 mg Nebulization 4x daily     levalbuterol  0.63 mg Nebulization 4x Daily     levothyroxine  112 mcg Per Feeding Tube Daily     magnesium sulfate  3 g Intravenous Once     methylPREDNISolone  40 mg Intravenous Q24H     multivitamins w/minerals  15 mL Per Feeding Tube Daily     nicotine  1 patch Transdermal Daily     nicotine   Transdermal Q8H     pantoprazole  40 mg Per Feeding Tube Daily     PARoxetine  20 mg Per Feeding Tube Daily     potassium chloride  20 mEq Oral or Feeding Tube Once     potassium chloride  20 mEq Intravenous Q2H     [START ON 7/4/2023] protein modular  1 packet Per Feeding Tube Daily     sodium chloride (PF)  10-40 mL Intracatheter Q8H     sodium chloride (PF)  3 mL Intracatheter Q8H       fentaNYL 100 mcg/hr (07/03/23 1200)     furosemide (LASIX) 100 mg in sodium chloride 0.9 % 100 mL infusion 5 mg/hr (07/03/23 1200)     lactated ringers Stopped (06/28/23 0011)     midazolam 1 mg/hr (07/03/23 1300)     - MEDICATION INSTRUCTIONS -       phenylephrine Stopped (07/02/23 1147)     propofol Stopped (07/02/23 1130)        Home Meds  No current facility-administered medications on file prior to encounter.  albuterol (PROAIR HFA/PROVENTIL HFA/VENTOLIN HFA) 108 (90 Base) MCG/ACT inhaler, Inhale 2 puffs into the lungs every 4 hours as needed for shortness of breath / dyspnea or wheezing Inhale 1-2 Puffs every 4 hours as needed for Wheezing.  albuterol (PROVENTIL) (2.5 MG/3ML) 0.083% neb solution, Take 1 vial (2.5 mg) by nebulization every 4 hours as needed for shortness of breath or wheezing  apixaban ANTICOAGULANT (ELIQUIS) 5 MG tablet, Take 1 tablet (5 mg) by mouth 2 times daily  atorvastatin (LIPITOR) 20 MG tablet, Take 20 mg by mouth daily  clonazePAM (KLONOPIN) 0.5 MG tablet, Take 0.5 mg by mouth daily  cyclobenzaprine (FLEXERIL) 5 MG tablet, Take 1 tablet (5 mg) by mouth every 8 hours as needed for muscle spasms  diclofenac (VOLTAREN) 1 % topical gel, Apply 2  g topically 4 times daily  diltiazem ER COATED BEADS (CARDIZEM CD/CARTIA XT) 240 MG 24 hr capsule, Take 1 capsule (240 mg) by mouth daily  fluticasone-salmeterol (ADVAIR-HFA) 230-21 MCG/ACT inhaler, Inhale 2 puffs into the lungs 2 times daily  ipratropium - albuterol 0.5 mg/2.5 mg/3 mL (DUONEB) 0.5-2.5 (3) MG/3ML neb solution, Take 1 vial (3 mLs) by nebulization every 6 hours as needed for shortness of breath / dyspnea or wheezing  levothyroxine (SYNTHROID/LEVOTHROID) 112 MCG tablet, Take 112 mcg by mouth daily  losartan (COZAAR) 25 MG tablet, Take 1 tablet (25 mg) by mouth daily  nicotine (NICODERM CQ) 21 MG/24HR 24 hr patch, Place 1 patch onto the skin daily  PARoxetine (PAXIL) 20 MG tablet, Take 20 mg by mouth daily  predniSONE (DELTASONE) 20 MG tablet, Take 60 mg for 2 days, then 40 mg for 3 days, then 20 mg for 3 days, then 10 mg for 3 days, then stop  tiotropium (SPIRIVA RESPIMAT) 2.5 MCG/ACT inhaler, Inhale 2 puffs into the lungs daily as needed               Physical Examination:   Temp:  [98.2  F (36.8  C)-99.9  F (37.7  C)] 99.3  F (37.4  C)  Pulse:  [] 101  Resp:  [10-21] 21  BP: (147)/(52) 147/52  MAP:  [66 mmHg-89 mmHg] 66 mmHg  Arterial Line BP: (112-156)/(43-59) 112/43  FiO2 (%):  [40 %-50 %] 45 %  SpO2:  [86 %-100 %] 97 %    Intake/Output Summary (Last 24 hours) at 6/26/2023 1215  Last data filed at 6/26/2023 1200  Gross per 24 hour   Intake 2195.19 ml   Output 4555 ml   Net -2359.81 ml     Wt Readings from Last 4 Encounters:   07/03/23 76.1 kg (167 lb 12.3 oz)   04/01/23 68.8 kg (151 lb 9.6 oz)   02/11/23 60 kg (132 lb 4.4 oz)   12/02/22 61.3 kg (135 lb 2.3 oz)     Arterial Line BP: (112-156)/(43-59) 112/43  MAP:  [66 mmHg-89 mmHg] 66 mmHg  Vent Mode: CMV/AC  (Continuous Mandatory Ventilation/ Assist Control)  FiO2 (%): 45 %  Resp Rate (Set): (S) 18 breaths/min (per order)  Tidal Volume (Set, mL): 310 mL  PEEP (cm H2O): 5 cmH2O  Pressure Support (cm H2O): (S) 10 cmH2O  Inspiratory Pressure (cm  H2O) (Drager Norma): 45  Resp: 21    Recent Labs   Lab 07/03/23  0950 07/02/23  0751 07/01/23  0808 06/30/23  0933   PH 7.45 7.42 7.27* 7.35   PCO2 84* 81* 103* 85*   PO2 54* 71* 197* 64*   HCO3 59* 52* 47* 47*   O2PER 40 50 70 40       GEN: no acute distress consistent with chemical sedation  HEENT: head ncat, sclera anicteric, OP patent, trachea midline   PULM: unlabored synchronous with vent, diffuse wheezes anteriorly but improving overall however wheezes increased this morning, chest tube in good position show signs of titling however no air leak  CV/COR: Irregular rate and rhythm S1S2 no gallop,  No rub, no murmur  ABD: soft nontender, hypoactive bowel sounds, no mass  EXT:  Edema   warm  NEURO: Does not follow commands, consistent with chemical sedation  SKIN: no obvious rash  LINES: clean, dry intact         Data:   All data and imaging reviewed     ROUTINE ICU LABS (Last four results)  CMP  Recent Labs   Lab 07/03/23  1210 07/03/23  1149 07/03/23  0819 07/03/23  0413 07/03/23  0200 07/02/23  2003 07/02/23  1725 07/02/23  1635 07/02/23  1350 07/02/23  0732 07/02/23  0538 07/01/23  0808 07/01/23  0601 06/30/23  1612 06/30/23  1327     --   --   --  147*  --   --   --  149*  --  152*  --  151*  --  151*   POTASSIUM 3.8  --   --   --  3.5  --   --   --  4.6  --  3.7   < > 3.5   < > 4.6   CHLORIDE 85*  --   --   --  97*  --   --   --  98  --  104  --  108*  --  102   CO2 >50*  --   --   --  49*  --   --   --  48*  --  45*  --  41*  --  44*   ANIONGAP  --   --   --   --  1*  --   --   --  3*  --  3*  --  2*  --  5*   * 186* 146* 138* 124*   < >  --    < > 224*   < > 121*   < > 133*   < > 203*   BUN 31.4*  --   --   --  30.5*  --   --   --  37.3*  --  37.0*  --  41.9*  --  38.7*   CR 0.43*  --   --   --  0.41*  --   --   --  0.49*  --  0.53  --  0.57  --  0.52   GFRESTIMATED >90  --   --   --  >90  --   --   --  >90  --  >90  --  >90  --  >90   EDIN 8.1*  --   --   --  7.4*  --   --   --  8.3*  --   8.0*  --  7.5*  --  8.6   MAG 2.1  --   --   --  1.8  --   --   --  1.9  --  2.2  --  2.5*   < > 2.4*   PHOS  --   --   --   --  2.2*  --  2.9  --   --   --  1.6*  --  3.6  --   --    PROTTOTAL  --   --   --   --   --   --   --   --   --   --   --   --   --   --  5.7*   ALBUMIN  --   --   --   --   --   --   --   --   --   --   --   --   --   --  3.3*   BILITOTAL  --   --   --   --   --   --   --   --   --   --   --   --   --   --  0.3   ALKPHOS  --   --   --   --   --   --   --   --   --   --   --   --   --   --  42   AST  --   --   --   --   --   --   --   --   --   --   --   --   --   --  43   ALT  --   --   --   --   --   --   --   --   --   --   --   --   --   --  53*    < > = values in this interval not displayed.     CBC  Recent Labs   Lab 07/03/23  0200 07/02/23  0538 07/01/23  1040 07/01/23  0601 06/30/23  0505   WBC 13.3* 14.2*  --  10.2 11.2*   RBC 2.27* 2.40*  --  2.31* 2.94*   HGB 7.6* 8.0* 8.8* 7.7* 9.7*   HCT 25.1* 26.7*  --  25.7* 32.1*   * 111*  --  111* 109*   MCH 33.5* 33.3*  --  33.3* 33.0   MCHC 30.3* 30.0*  --  30.0* 30.2*   RDW 14.0 13.7  --  14.2 13.7   PLT 91* 106*  --  84* 111*     INR  No lab results found in last 7 days.  Arterial Blood Gas  Recent Labs   Lab 07/03/23  0950 07/02/23  0751 07/01/23  0808 06/30/23  0933   PH 7.45 7.42 7.27* 7.35   PCO2 84* 81* 103* 85*   PO2 54* 71* 197* 64*   HCO3 59* 52* 47* 47*   O2PER 40 50 70 40       All cultures:  No results for input(s): CULT in the last 168 hours.  Recent Results (from the past 24 hour(s))   XR Chest Port 1 View    Narrative    CHEST ONE VIEW  6/26/2023 9:29 AM     HISTORY: Increasing oxygen requirements and airway pressure.    COMPARISON: June 23, 2023      Impression    IMPRESSION: Endotracheal tube tip 4.3 cm from the stevie. Right PICC  line stable. Minimal pleural fluid or pleural thickening bilaterally  similar to previous. Small-to-moderate pneumothorax on the left new  from previous.    Results called to the  patient's nurse on June 26, 2023 at 9:44 AM.     GHAZALA ORELLANA MD         SYSTEM ID:  [id number]         Billing: This patient is critically ill: yes. Total critical care time today33 min.            Ghazala Mackey,   Critical Care Medicine

## 2023-07-03 NOTE — PROGRESS NOTES
Inpatient Cardiology Consultation Progress Note:    Lara Melendez MRN#: [id number]   YOB: 1965 Age: 57 year old     Date of Admission: 6/20/2023  Consult indication: atrial fibrillation          Assessment and Plan:     # Paroxysmal/persistent atrial fibrillation in setting of acute on chronic hypoxic hypercarbic respiratory failure requiring intubation and mechanical ventilation, underlying severe COPD on supple O2, now with pneumothorax s/p chest tube placement, possible pneumonia. Required DCCV x2, remains in intermittent atrial fibrillation     TTE 3/2023 LVEF >70%, normal RV function, no significant valvular abnormalities    - agree with diltiazem, will incr dose to 90mg q6h with hold parameters  - will transition amiodarone GTT to PO regimen with amiodarone 400mg BID for 9 days to finish the load then 200mg daily  - continue apixaban  - agree with diuresis, will need to monitor renal function  - will hold digoxin for now since HR while in atrial fibrillation are in the 110 bpm range, also previously did have issues with hyperkalemia. Can restart digoxin if needed   - Cardiac telemetry.  Monitor electrolytes daily, maintain potassium greater than 4, magnesium greater than 2  - Cardiology will follow     Thank you for allowing our team to participate in the care of Lara Melendez.  Please do not hesitate to page me with any questions or concerns.     Alexis Hough MD, [facility]  Cardiology  July 3, 2023    Voice recognition software utilized.     High complexity          Interval Events:     - intermittent atrial fibrillation   - off pressors this AM         Medications reviewed:     Current medications:  Current Facility-Administered Medications Ordered in Epic   Medication Dose Route Frequency Last Rate Last Admin     acetaminophen (TYLENOL) solution 650 mg  650 mg Per Feeding Tube Q4H PRN   650 mg at 07/02/23 1349    Or     acetaminophen (TYLENOL) Suppository 650 mg  650 mg Rectal  Q4H PRN         [START ON 7/13/2023] amiodarone (PACERONE) tablet 200 mg  200 mg Oral Daily         amiodarone (PACERONE) tablet 400 mg  400 mg Oral BID   400 mg at 07/03/23 0856     apixaban ANTICOAGULANT (ELIQUIS) tablet 5 mg  5 mg Per Feeding Tube BID   5 mg at 07/03/23 0856     atorvastatin (LIPITOR) tablet 20 mg  20 mg Per Feeding Tube Daily   20 mg at 07/03/23 0856     cyclobenzaprine (FLEXERIL) tablet 5 mg  5 mg Per Feeding Tube Q8H PRN         dextrose 5% infusion   Intravenous Continuous 50 mL/hr at 07/03/23 0600 Rate Verify at 07/03/23 0600     glucose gel 15-30 g  15-30 g Oral Q15 Min PRN        Or     dextrose 50 % injection 25-50 mL  25-50 mL Intravenous Q15 Min PRN        Or     glucagon injection 1 mg  1 mg Subcutaneous Q15 Min PRN         diltiazem (CARDIZEM) tablet 60 mg  60 mg Oral or Feeding Tube Q6H ELZBIETA   60 mg at 07/03/23 0529     sennosides (SENOKOT) syrup 5-10 mL  5-10 mL Per Feeding Tube BID PRN   10 mL at 06/28/23 0554    And     docusate (COLACE) 50 MG/5ML liquid  mg   mg Per Feeding Tube BID PRN   100 mg at 06/26/23 0820     fentaNYL (SUBLIMAZE) 50 mcg/mL bolus from pump  50 mcg Intravenous Q1H PRN   50 mcg at 06/29/23 2222     fentaNYL (SUBLIMAZE) infusion   mcg/hr Intravenous Continuous 2 mL/hr at 07/03/23 0600 100 mcg/hr at 07/03/23 0600     flumazenil (ROMAZICON) injection 0.2 mg  0.2 mg Intravenous q1 min prn         furosemide (LASIX) 100 mg in sodium chloride 0.9 % 100 mL infusion  5 mg/hr Intravenous Continuous 5 mL/hr at 07/03/23 0645 5 mg/hr at 07/03/23 0645     hydrALAZINE (APRESOLINE) injection 10 mg  10 mg Intravenous Q6H PRN   10 mg at 06/27/23 0959     insulin aspart (NovoLOG) injection (RAPID ACTING)  1-6 Units Subcutaneous Q4H   1 Units at 07/03/23 0859     ipratropium (ATROVENT) 0.02 % neb solution 0.5 mg  0.5 mg Nebulization 4x daily   0.5 mg at 07/03/23 0719     lactated ringers infusion   Intravenous Continuous   Stopped at 06/28/23 0011      levalbuterol (XOPENEX) neb solution 0.63 mg  0.63 mg Nebulization 4x Daily   0.63 mg at 07/03/23 0719     levalbuterol (XOPENEX) neb solution 0.63 mg  0.63 mg Nebulization Q2H PRN   0.63 mg at 06/22/23 0426     levothyroxine (SYNTHROID/LEVOTHROID) tablet 112 mcg  112 mcg Per Feeding Tube Daily   112 mcg at 07/03/23 0856     LORazepam (ATIVAN) injection 0.5 mg  0.5 mg Intravenous Q4H PRN   0.5 mg at 06/26/23 1052     meropenem (MERREM) 1 g vial to attach to  mL bag  1 g Intravenous Q8H 200 mL/hr at 07/03/23 0531 1 g at 07/03/23 0531     methylPREDNISolone sodium succinate (solu-MEDROL) injection 40 mg  40 mg Intravenous Q24H   40 mg at 07/03/23 0857     midazolam (VERSED) drip - ADULT 100 mg/100 mL in NS (pre-mix)  1-12 mg/hr Intravenous Continuous 8 mL/hr at 07/03/23 0600 8 mg/hr at 07/03/23 0600     midazolam (VERSED) injection 2 mg  2 mg Intravenous Q1H PRN   2 mg at 07/02/23 0942     multivitamins w/minerals liquid 15 mL  15 mL Per Feeding Tube Daily   15 mL at 07/03/23 0856     naloxone (NARCAN) injection 0.2 mg  0.2 mg Intravenous Q2 Min PRN        Or     naloxone (NARCAN) injection 0.4 mg  0.4 mg Intravenous Q2 Min PRN        Or     naloxone (NARCAN) injection 0.2 mg  0.2 mg Intramuscular Q2 Min PRN        Or     naloxone (NARCAN) injection 0.4 mg  0.4 mg Intramuscular Q2 Min PRN         nicotine (NICODERM CQ) 14 MG/24HR 24 hr patch 1 patch  1 patch Transdermal Daily   1 patch at 07/03/23 0855     nicotine Patch in Place   Transdermal Q8H         ondansetron (ZOFRAN ODT) ODT tab 4 mg  4 mg Oral Q6H PRN        Or     ondansetron (ZOFRAN) injection 4 mg  4 mg Intravenous Q6H PRN         pantoprazole (PROTONIX) 2 mg/mL suspension 40 mg  40 mg Per Feeding Tube Daily   40 mg at 07/03/23 0909     PARoxetine (PAXIL) suspension 20 mg  20 mg Per Feeding Tube Daily   20 mg at 07/03/23 0909     Patient is already receiving anticoagulation with heparin, enoxaparin (LOVENOX), warfarin (COUMADIN)  or other  anticoagulant medication   Does not apply Continuous PRN         phenylephrine (TONY-SYNEPHRINE) 50 mg in NaCl 0.9 % 250 mL infusion  0.1-6 mcg/kg/min Intravenous Continuous   Stopped at 23 1147     polyethylene glycol (MIRALAX) Packet 17 g  17 g Oral Daily PRN   17 g at 23 0405     potassium & sodium phosphates (NEUTRA-PHOS) Packet 1 packet  1 packet Oral or Feeding Tube Q4H   1 packet at 23 0644     prochlorperazine (COMPAZINE) injection 10 mg  10 mg Intravenous Q6H PRN        Or     prochlorperazine (COMPAZINE) tablet 10 mg  10 mg Oral Q6H PRN        Or     prochlorperazine (COMPAZINE) suppository 25 mg  25 mg Rectal Q12H PRN         propofol (DIPRIVAN) infusion  5-75 mcg/kg/min Intravenous Continuous   Stopped at 23 1130     protein modular (PROSOURCE TF20) packet 1 packet  1 packet Per Feeding Tube BID   1 packet at 23 0938     sodium chloride (PF) 0.9% PF flush 10-20 mL  10-20 mL Intracatheter q1 min prn         sodium chloride (PF) 0.9% PF flush 10-40 mL  10-40 mL Intracatheter Once PRN         sodium chloride (PF) 0.9% PF flush 10-40 mL  10-40 mL Intracatheter Q8H   10 mL at 23 0857     sodium chloride (PF) 0.9% PF flush 10-40 mL  10-40 mL Intracatheter Q1H PRN         sodium chloride (PF) 0.9% PF flush 3 mL  3 mL Intracatheter Q8H   3 mL at 23 1516     sodium chloride (PF) 0.9% PF flush 3 mL  3 mL Intracatheter q1 min prn         No current [facility]-ordered outpatient medications on file.             Physical Exam:   Vital signs were reviewed:  Temperatures:  Current - Temp: 98.4  F (36.9  C); Max - Temp  Av.6  F (37.6  C)  Min: 98.2  F (36.8  C)  Max: 101.3  F (38.5  C)  Respiration range: Resp  Avg: 15.7  Min: 12  Max: 19  Pulse range: Pulse  Av.7  Min: 74  Max: 168  Blood pressure range: Systolic (24hrs), Av , Min:147 , Max:147   ; Diastolic (24hrs), Av, Min:52, Max:52    Pulse oximetry range: SpO2  Av.2 %  Min: 88 %  Max: 100  %    Intake/Output Summary (Last 24 hours) at 7/3/2023 1028  Last data filed at 7/3/2023 0950  Gross per 24 hour   Intake 4696.66 ml   Output 4890 ml   Net -193.34 ml     167 lbs 12.32 oz  Body mass index is 29.72 kg/m .   Body surface area is 1.84 meters squared.    General/Constitutional: appears stated age, intubated and sedated  Head: normocephalic, atraumatic  Eyes - No scleral icterus  Neck: supple, trachea midline  Respiratory: diminished breath sounds bilaterally, mechanically ventilated  Cardiovascular: regular rate, regular rhythm, no obvious murmurs, no lower extremity edema  Gastrointestinal: no guarding, non-rigid          Selected laboratory tests:   Laboratory test results personally reviewed:   CMP  Recent Labs   Lab 07/03/23  0819 07/03/23  0413 07/03/23  0200 07/03/23  0004 07/02/23 2003 07/02/23  1725 07/02/23  1635 07/02/23  1350 07/02/23  0732 07/02/23  0538 07/01/23  2326 07/01/23  2322 07/01/23  0808 07/01/23  0601 06/30/23  1612 06/30/23  1327   NA  --   --  147*  --   --   --   --  149*  --  152*  --   --   --  151*  --  151*   POTASSIUM  --   --  3.5  --   --   --   --  4.6  --  3.7  --  4.3   < > 3.5   < > 4.6   CHLORIDE  --   --  97*  --   --   --   --  98  --  104  --   --   --  108*  --  102   CO2  --   --  49*  --   --   --   --  48*  --  45*  --   --   --  41*  --  44*   ANIONGAP  --   --  1*  --   --   --   --  3*  --  3*  --   --   --  2*  --  5*   * 138* 124* 132*   < >  --    < > 224*   < > 121*   < >  --    < > 133*   < > 203*   BUN  --   --  30.5*  --   --   --   --  37.3*  --  37.0*  --   --   --  41.9*  --  38.7*   CR  --   --  0.41*  --   --   --   --  0.49*  --  0.53  --   --   --  0.57  --  0.52   GFRESTIMATED  --   --  >90  --   --   --   --  >90  --  >90  --   --   --  >90  --  >90   EDIN  --   --  7.4*  --   --   --   --  8.3*  --  8.0*  --   --   --  7.5*  --  8.6   MAG  --   --  1.8  --   --   --   --  1.9  --  2.2  --   --   --  2.5*   < > 2.4*   PHOS  --   --   2.2*  --   --  2.9  --   --   --  1.6*  --   --   --  3.6  --   --    PROTTOTAL  --   --   --   --   --   --   --   --   --   --   --   --   --   --   --  5.7*   ALBUMIN  --   --   --   --   --   --   --   --   --   --   --   --   --   --   --  3.3*   BILITOTAL  --   --   --   --   --   --   --   --   --   --   --   --   --   --   --  0.3   ALKPHOS  --   --   --   --   --   --   --   --   --   --   --   --   --   --   --  42   AST  --   --   --   --   --   --   --   --   --   --   --   --   --   --   --  43   ALT  --   --   --   --   --   --   --   --   --   --   --   --   --   --   --  53*    < > = values in this interval not displayed.     CBC  Recent Labs   Lab 07/03/23  0200 07/02/23  0538 07/01/23  1040 07/01/23  0601 06/30/23  0505   WBC 13.3* 14.2*  --  10.2 11.2*   RBC 2.27* 2.40*  --  2.31* 2.94*   HGB 7.6* 8.0* 8.8* 7.7* 9.7*   HCT 25.1* 26.7*  --  25.7* 32.1*   * 111*  --  111* 109*   MCH 33.5* 33.3*  --  33.3* 33.0   MCHC 30.3* 30.0*  --  30.0* 30.2*   RDW 14.0 13.7  --  14.2 13.7   PLT 91* 106*  --  84* 111*     INRNo lab results found in last 7 days.  No results found for: TROPI, TROPONIN, TROPR, TROPN  Recent Labs   Lab Test 06/30/23  0505 06/29/23  1638   TRIG 97 76     Lab Results   Component Value Date    A1C 4.9 03/26/2023    A1C 4.4 12/01/2022     TSH   Date Value Ref Range Status   07/02/2023 0.31 0.30 - 4.20 uIU/mL Final   01/29/2006 4.68 0.4 - 5.0 mU/L Final

## 2023-07-03 NOTE — PROGRESS NOTES
ICU End of Shift Summary.  For vital signs and complete assessments, please see documentation flowsheets.      Pertinent assessments:  CV: ST/SR w/ frequent PAC's, PVC's, HR between 's. Jarad has remained off-patient slightly hypertensive this shift, amio infusion running, and scheduled dilt given.  Neuro: RASS goal -4 to -5, Versed and Fentanyl. Patient opens eyes spontaneously but does not follow commands.   Pulm: Vent  Renal: Hines, IV lasix w/ good UO  GI: TF @ 35 (goal), 230 mL of free water flushes q4h, 3 watery BM's overnight  Skin: No changes   ID: Afebrile this shift, IV Vanco added and given last night  Endo: q4h blood sugar checks with sliding scale  Labs: Mag, K, and Phosph replaced this shift     Discharge/Transfer Needs: Pending clinical status     Bedside Shift Report Completed : Yes   Bedside Safety Check Completed: Yes

## 2023-07-03 NOTE — PROGRESS NOTES
CLINICAL NUTRITION SERVICES - BRIEF NOTE      CURRENT NUTRITION SUPPORT    Access: NJ tube (6/28)  Formula/Rate/Provisions: Vital HP @ 35 ml/hr x 22 hrs (770 ml), which provides 770 kcal, 67 g protein, 85 g CHO, 0 g fiber, 642 ml free H2O      Flushes: H2O- 60 ml q 4 hrs--MD managing, pt was requiring very high flushes over past few days    Modulars: Prosource TF 20 x 2 pkts/day= 160 kcal/20 g protein    D5 @ 50 ml/hr provides 1200 ml, 60 g CHO, 204 kcal--dc'd this afternoon    Total energy/protein provisions: 1134kcal/107 g protein    Currently somewhat underfeeding due to propofol d/c'd     NEW FINDINGS   Patient discussed at rounds this morning. Pt continues on low tidal volumes d/t pneumothorax, which have led to increased pCO2 and HCO, but this is not related to overfeeding.     Labs    Electrolytes  Sodium (mmol/L)   Date Value   07/03/2023 141   02/02/2006 133     Potassium (mmol/L)   Date Value   07/03/2023 3.8   05/17/2022 4.5   02/02/2006 3.4     Potassium POCT (mmol/L)   Date Value   06/21/2023 5.1     Magnesium (mg/dL)   Date Value   07/03/2023 2.1   01/31/2006 2.0     Phosphorus (mg/dL)   Date Value   07/03/2023 2.2 (L)   01/30/2006 3.1    Renal  Urea Nitrogen (mg/dL)   Date Value   07/03/2023 31.4 (H)   05/17/2022 17   02/02/2006 <2 (L)     UREA NITROGEN POCT (mg/dL)   Date Value   06/21/2023 7     Creatinine (mg/dL)   Date Value   07/03/2023 0.43 (L)   02/02/2006 0.80     Inflammatory Markers  WBC (10e9/L)   Date Value   02/02/2006 6.2     WBC Count (10e3/uL)   Date Value   07/03/2023 13.3 (H)     Albumin (g/dL)   Date Value   06/30/2023 3.3 (L)   05/15/2022 3.0 (L)   02/01/2006 2.3 (L)      Blood Glucose  Glucose (mg/dL)   Date Value   07/03/2023 200 (H)   05/17/2022 124 (H)   02/02/2006 90     GLUCOSE BY METER POCT (mg/dL)   Date Value   07/03/2023 186 (H)     Hemoglobin A1C (%)   Date Value   03/26/2023 4.9   12/01/2022 4.4    Hepatic  ALT (U/L)   Date Value   06/30/2023 53 (H)   02/01/2006 26      AST (U/L)   Date Value   06/30/2023 43   02/01/2006 24     Alkaline Phosphatase (U/L)   Date Value   06/30/2023 42   02/01/2006 205 (H)     Bilirubin Total (mg/dL)   Date Value   06/30/2023 0.3   02/01/2006 1.0    Additional  Triglycerides (mg/dL)   Date Value   06/30/2023 97     Ketones Urine (mg/dL)   Date Value   06/28/2023 Negative   01/29/2006 Negative        Last Arterial Blood Gas:  pH Arterial   Date Value Ref Range Status   07/03/2023 7.45 7.35 - 7.45 Final     pCO2 Arterial   Date Value Ref Range Status   07/03/2023 84 (HH) 35 - 45 mm Hg Final     pO2 Arterial   Date Value Ref Range Status   07/03/2023 54 (L) 80 - 105 mm Hg Final     Bicarbonate Arterial   Date Value Ref Range Status   07/03/2023 59 (HH) 21 - 28 mmol/L Final     Base Excess/Deficit (+/-)   Date Value Ref Range Status   07/03/2023 30.2 (H) -9.0 - 1.8 mmol/L Final     Comment:     BASE EXCESS OUT OF RANGE - VALUE ABOVE IS WHAT ANALYZER MEASURED       Medications/infusions    [START ON 7/12/2023] amiodarone  200 mg Oral Daily     amiodarone  400 mg Oral BID     apixaban ANTICOAGULANT  5 mg Per Feeding Tube BID     atorvastatin  20 mg Per Feeding Tube Daily     ceFEPIme  2 g Intravenous Q8H     diltiazem  90 mg Oral or Feeding Tube Q6H [facility]     insulin aspart  1-6 Units Subcutaneous Q4H     ipratropium  0.5 mg Nebulization 4x daily     levalbuterol  0.63 mg Nebulization 4x Daily     levothyroxine  112 mcg Per Feeding Tube Daily     methylPREDNISolone  40 mg Intravenous Q24H     multivitamins w/minerals  15 mL Per Feeding Tube Daily     nicotine  1 patch Transdermal Daily     nicotine   Transdermal Q8H     pantoprazole  40 mg Per Feeding Tube Daily     PARoxetine  20 mg Per Feeding Tube Daily     protein modular  1 packet Per Feeding Tube BID     sodium chloride (PF)  10-40 mL Intracatheter Q8H     sodium chloride (PF)  3 mL Intracatheter Q8H        D5W 50 mL/hr at 07/03/23 0600     fentaNYL 100 mcg/hr (07/03/23 1200)     furosemide (LASIX)  100 mg in sodium chloride 0.9 % 100 mL infusion 5 mg/hr (07/03/23 1200)     lactated ringers Stopped (06/28/23 0011)     midazolam 1 mg/hr (07/03/23 1300)     - MEDICATION INSTRUCTIONS -       phenylephrine Stopped (07/02/23 1147)     propofol Stopped (07/02/23 1130)        ASSESSED NUTRITION NEEDS  Dose weight: 64.5 kg  Estimated Energy Needs: 3337-4565 kcals/day (20-25 kcal/kg)  Justification: Vented  Estimated Protein Needs:  grams protein/day (1.2 - 2 grams of pro/kg)  Justification: Hypercatabolism with critical illness  Estimated Fluid Needs: 1925 mL/day (30 mL/kg)   Justification: Maintenance    INTERVENTIONS  Implementation  Enteral Nutrition - Modify rate and composition - Promote @ goal of 65ml/hr x 22 hrs (1430 ml/day) provides: 1430 kcals, 89 g PRO, 1201 ml free H20, 186 g CHO, and 0 g fiber daily.    Decrease Prosource TF 20 to 1 pkt/day= 80 kcal/20 g protein    Total energy/protein provisions, all sources= 1510 kcal (23 kcal/kg)/109 g protein (1.7 g/kg) per DW 64.5 kg    Monitoring/Evaluation  Will continue to monitor and evaluate per protocol.    Danielle Molina RD, LD, [facility]  Pager - 3rd floor/ICU: [phone]  Pager - All other floors: [phone]  Pager - Weekend/holiday: [phone]  Office: [phone]

## 2023-07-03 NOTE — PROGRESS NOTES
[facility] ICU VENTILATOR RESPIRATORY NOTE  Date of Admission: 06/20/23  Date of Intubation (most recent): 06/21/23  Reason for Mechanical Ventilation: Respiratory failure  Number of Days on Mechanical Ventilation: 13  Met Criteria for Pressure Support Trial: Yes  Length of Pressure Support Trial: 1 minute on PS 10/+5 @ 50%  Reason for Stopping Pressure Support Trial: RR 4, apneic   Significant Events Today: Pt fail weaning trial this morning  ABG Results:   Recent Labs   Lab 07/02/23  0751 07/01/23  0808 06/30/23  0933 06/30/23  0820   PH 7.42 7.27* 7.35 7.36   PCO2 81* 103* 85* 85*   PO2 71* 197* 64* 67*   HCO3 52* 47* 47* 47*   O2PER 50 70 40 40       Plan:  Continue to monitor and assess pt's respiratory status    Vent Mode: (S) CMV/AC  (Continuous Mandatory Ventilation/ Assist Control)  FiO2 (%): 50 %  Resp Rate (Set): 16 breaths/min  Tidal Volume (Set, mL): 310 mL  PEEP (cm H2O): 5 cmH2O  Pressure Support (cm H2O): (S) 10 cmH2O  Resp: 16        Ulices Escalante, RT

## 2023-07-04 ENCOUNTER — APPOINTMENT (OUTPATIENT)
Dept: GENERAL RADIOLOGY | Facility: CLINIC | Age: 58
End: 2023-07-04
Attending: SURGERY
Payer: COMMERCIAL

## 2023-07-04 LAB
ALLEN'S TEST: NO
ANION GAP SERPL CALCULATED.3IONS-SCNC: ABNORMAL MMOL/L
BASE EXCESS BLDA CALC-SCNC: 32.7 MMOL/L (ref -9–1.8)
BASE EXCESS BLDV CALC-SCNC: 26.3 MMOL/L (ref -7.7–1.9)
BUN SERPL-MCNC: 33.9 MG/DL (ref 8–23)
CALCIUM SERPL-MCNC: 8.9 MG/DL (ref 8.6–10)
CHLORIDE SERPL-SCNC: 80 MMOL/L (ref 98–107)
CREAT SERPL-MCNC: 0.52 MG/DL (ref 0.51–0.95)
DEPRECATED HCO3 PLAS-SCNC: >50 MMOL/L (ref 22–29)
ERYTHROCYTE [DISTWIDTH] IN BLOOD BY AUTOMATED COUNT: 13.8 % (ref 10–15)
GFR SERPL CREATININE-BSD FRML MDRD: >90 ML/MIN/1.73M2
GLUCOSE BLDC GLUCOMTR-MCNC: 128 MG/DL (ref 70–99)
GLUCOSE BLDC GLUCOMTR-MCNC: 148 MG/DL (ref 70–99)
GLUCOSE BLDC GLUCOMTR-MCNC: 193 MG/DL (ref 70–99)
GLUCOSE BLDC GLUCOMTR-MCNC: 196 MG/DL (ref 70–99)
GLUCOSE BLDC GLUCOMTR-MCNC: 243 MG/DL (ref 70–99)
GLUCOSE SERPL-MCNC: 138 MG/DL (ref 70–99)
HCO3 BLD-SCNC: 60 MMOL/L (ref 21–28)
HCO3 BLDV-SCNC: 54 MMOL/L (ref 21–28)
HCT VFR BLD AUTO: 30.9 % (ref 35–47)
HGB BLD-MCNC: 10 G/DL (ref 11.7–15.7)
MAGNESIUM SERPL-MCNC: 2.1 MG/DL (ref 1.7–2.3)
MCH RBC QN AUTO: 33.3 PG (ref 26.5–33)
MCHC RBC AUTO-ENTMCNC: 32.4 G/DL (ref 31.5–36.5)
MCV RBC AUTO: 103 FL (ref 78–100)
O2/TOTAL GAS SETTING VFR VENT: 40 %
O2/TOTAL GAS SETTING VFR VENT: 40 %
PCO2 BLD: 69 MM HG (ref 35–45)
PCO2 BLDV: 75 MM HG (ref 40–50)
PH BLD: 7.55 [PH] (ref 7.35–7.45)
PH BLDV: 7.46 [PH] (ref 7.32–7.43)
PHOSPHATE SERPL-MCNC: 2.7 MG/DL (ref 2.5–4.5)
PLATELET # BLD AUTO: 140 10E3/UL (ref 150–450)
PO2 BLD: 83 MM HG (ref 80–105)
PO2 BLDV: 45 MM HG (ref 25–47)
POTASSIUM SERPL-SCNC: 3.9 MMOL/L (ref 3.4–5.3)
POTASSIUM SERPL-SCNC: 4.3 MMOL/L (ref 3.4–5.3)
RBC # BLD AUTO: 3 10E6/UL (ref 3.8–5.2)
SODIUM SERPL-SCNC: 138 MMOL/L (ref 136–145)
WBC # BLD AUTO: 16.2 10E3/UL (ref 4–11)

## 2023-07-04 PROCEDURE — 82803 BLOOD GASES ANY COMBINATION: CPT | Performed by: SURGERY

## 2023-07-04 PROCEDURE — 250N000011 HC RX IP 250 OP 636: Performed by: SURGERY

## 2023-07-04 PROCEDURE — 250N000009 HC RX 250: Performed by: INTERNAL MEDICINE

## 2023-07-04 PROCEDURE — 200N000001 HC R&B ICU

## 2023-07-04 PROCEDURE — 250N000011 HC RX IP 250 OP 636: Performed by: ANESTHESIOLOGY

## 2023-07-04 PROCEDURE — 82310 ASSAY OF CALCIUM: CPT | Performed by: INTERNAL MEDICINE

## 2023-07-04 PROCEDURE — 250N000013 HC RX MED GY IP 250 OP 250 PS 637: Performed by: SURGERY

## 2023-07-04 PROCEDURE — 84132 ASSAY OF SERUM POTASSIUM: CPT | Performed by: INTERNAL MEDICINE

## 2023-07-04 PROCEDURE — 250N000013 HC RX MED GY IP 250 OP 250 PS 637: Performed by: INTERNAL MEDICINE

## 2023-07-04 PROCEDURE — 250N000011 HC RX IP 250 OP 636: Mod: JZ | Performed by: SURGERY

## 2023-07-04 PROCEDURE — 250N000011 HC RX IP 250 OP 636: Mod: JZ | Performed by: ANESTHESIOLOGY

## 2023-07-04 PROCEDURE — 83735 ASSAY OF MAGNESIUM: CPT | Performed by: INTERNAL MEDICINE

## 2023-07-04 PROCEDURE — 999N000157 HC STATISTIC RCP TIME EA 10 MIN

## 2023-07-04 PROCEDURE — 94003 VENT MGMT INPAT SUBQ DAY: CPT

## 2023-07-04 PROCEDURE — 999N000253 HC STATISTIC WEANING TRIALS

## 2023-07-04 PROCEDURE — 94640 AIRWAY INHALATION TREATMENT: CPT

## 2023-07-04 PROCEDURE — 250N000013 HC RX MED GY IP 250 OP 250 PS 637: Performed by: HOSPITALIST

## 2023-07-04 PROCEDURE — 99291 CRITICAL CARE FIRST HOUR: CPT | Performed by: SURGERY

## 2023-07-04 PROCEDURE — 71045 X-RAY EXAM CHEST 1 VIEW: CPT

## 2023-07-04 PROCEDURE — 258N000003 HC RX IP 258 OP 636: Performed by: ANESTHESIOLOGY

## 2023-07-04 PROCEDURE — 99232 SBSQ HOSP IP/OBS MODERATE 35: CPT | Performed by: INTERNAL MEDICINE

## 2023-07-04 PROCEDURE — 84100 ASSAY OF PHOSPHORUS: CPT | Performed by: INTERNAL MEDICINE

## 2023-07-04 PROCEDURE — 94640 AIRWAY INHALATION TREATMENT: CPT | Mod: 76

## 2023-07-04 PROCEDURE — 85027 COMPLETE CBC AUTOMATED: CPT | Performed by: INTERNAL MEDICINE

## 2023-07-04 PROCEDURE — 99233 SBSQ HOSP IP/OBS HIGH 50: CPT | Performed by: INTERNAL MEDICINE

## 2023-07-04 RX ORDER — POTASSIUM CHLORIDE 1.5 G/1.58G
40 POWDER, FOR SOLUTION ORAL ONCE
Status: COMPLETED | OUTPATIENT
Start: 2023-07-04 | End: 2023-07-04

## 2023-07-04 RX ORDER — MAGNESIUM SULFATE HEPTAHYDRATE 40 MG/ML
2 INJECTION, SOLUTION INTRAVENOUS ONCE
Status: COMPLETED | OUTPATIENT
Start: 2023-07-04 | End: 2023-07-04

## 2023-07-04 RX ORDER — DEXTROSE MONOHYDRATE 25 G/50ML
25-50 INJECTION, SOLUTION INTRAVENOUS
Status: DISCONTINUED | OUTPATIENT
Start: 2023-07-04 | End: 2023-07-28 | Stop reason: HOSPADM

## 2023-07-04 RX ORDER — NICOTINE POLACRILEX 4 MG
15-30 LOZENGE BUCCAL
Status: DISCONTINUED | OUTPATIENT
Start: 2023-07-04 | End: 2023-07-28 | Stop reason: HOSPADM

## 2023-07-04 RX ADMIN — PAROXETINE 20 MG: 10 SUSPENSION ORAL at 08:44

## 2023-07-04 RX ADMIN — CEFEPIME 2 G: 2 INJECTION, POWDER, FOR SOLUTION INTRAVENOUS at 18:32

## 2023-07-04 RX ADMIN — INSULIN ASPART 1 UNITS: 100 INJECTION, SOLUTION INTRAVENOUS; SUBCUTANEOUS at 09:06

## 2023-07-04 RX ADMIN — IPRATROPIUM BROMIDE 0.5 MG: 0.5 SOLUTION RESPIRATORY (INHALATION) at 07:24

## 2023-07-04 RX ADMIN — LEVALBUTEROL HYDROCHLORIDE 0.63 MG: 0.63 SOLUTION RESPIRATORY (INHALATION) at 07:24

## 2023-07-04 RX ADMIN — METHYLPREDNISOLONE SODIUM SUCCINATE 40 MG: 40 INJECTION, POWDER, FOR SOLUTION INTRAMUSCULAR; INTRAVENOUS at 08:42

## 2023-07-04 RX ADMIN — DILTIAZEM HYDROCHLORIDE 90 MG: 30 TABLET, FILM COATED ORAL at 11:52

## 2023-07-04 RX ADMIN — APIXABAN 5 MG: 5 TABLET, FILM COATED ORAL at 08:42

## 2023-07-04 RX ADMIN — MAGNESIUM SULFATE HEPTAHYDRATE 2 G: 2 INJECTION, SOLUTION INTRAVENOUS at 08:42

## 2023-07-04 RX ADMIN — INSULIN ASPART 2 UNITS: 100 INJECTION, SOLUTION INTRAVENOUS; SUBCUTANEOUS at 11:59

## 2023-07-04 RX ADMIN — Medication 40 MG: at 08:45

## 2023-07-04 RX ADMIN — Medication 100 MCG/HR: at 05:39

## 2023-07-04 RX ADMIN — LEVOTHYROXINE SODIUM 112 MCG: 0.11 TABLET ORAL at 08:42

## 2023-07-04 RX ADMIN — MIDAZOLAM HYDROCHLORIDE 3 MG/HR: 1 INJECTION, SOLUTION INTRAVENOUS at 10:41

## 2023-07-04 RX ADMIN — POTASSIUM & SODIUM PHOSPHATES POWDER PACK 280-160-250 MG 1 PACKET: 280-160-250 PACK at 06:39

## 2023-07-04 RX ADMIN — IPRATROPIUM BROMIDE 0.5 MG: 0.5 SOLUTION RESPIRATORY (INHALATION) at 20:01

## 2023-07-04 RX ADMIN — FUROSEMIDE 5 MG/HR: 10 INJECTION, SOLUTION INTRAMUSCULAR; INTRAVENOUS at 06:14

## 2023-07-04 RX ADMIN — AMIODARONE HYDROCHLORIDE 400 MG: 200 TABLET ORAL at 21:15

## 2023-07-04 RX ADMIN — ATORVASTATIN CALCIUM 20 MG: 20 TABLET, FILM COATED ORAL at 08:42

## 2023-07-04 RX ADMIN — CEFEPIME 2 G: 2 INJECTION, POWDER, FOR SOLUTION INTRAVENOUS at 04:05

## 2023-07-04 RX ADMIN — LEVALBUTEROL HYDROCHLORIDE 0.63 MG: 0.63 SOLUTION RESPIRATORY (INHALATION) at 12:06

## 2023-07-04 RX ADMIN — IPRATROPIUM BROMIDE 0.5 MG: 0.5 SOLUTION RESPIRATORY (INHALATION) at 12:06

## 2023-07-04 RX ADMIN — LEVALBUTEROL HYDROCHLORIDE 0.63 MG: 0.63 SOLUTION RESPIRATORY (INHALATION) at 15:17

## 2023-07-04 RX ADMIN — AMIODARONE HYDROCHLORIDE 400 MG: 200 TABLET ORAL at 08:42

## 2023-07-04 RX ADMIN — POTASSIUM CHLORIDE FOR ORAL SOLUTION 40 MEQ: 1.5 POWDER, FOR SOLUTION ORAL at 08:42

## 2023-07-04 RX ADMIN — CEFEPIME 2 G: 2 INJECTION, POWDER, FOR SOLUTION INTRAVENOUS at 10:45

## 2023-07-04 RX ADMIN — DILTIAZEM HYDROCHLORIDE 90 MG: 30 TABLET, FILM COATED ORAL at 17:50

## 2023-07-04 RX ADMIN — LEVALBUTEROL HYDROCHLORIDE 0.63 MG: 0.63 SOLUTION RESPIRATORY (INHALATION) at 20:01

## 2023-07-04 RX ADMIN — INSULIN ASPART 1 UNITS: 100 INJECTION, SOLUTION INTRAVENOUS; SUBCUTANEOUS at 00:04

## 2023-07-04 RX ADMIN — POTASSIUM & SODIUM PHOSPHATES POWDER PACK 280-160-250 MG 1 PACKET: 280-160-250 PACK at 10:39

## 2023-07-04 RX ADMIN — DILTIAZEM HYDROCHLORIDE 90 MG: 30 TABLET, FILM COATED ORAL at 00:05

## 2023-07-04 RX ADMIN — NICOTINE 1 PATCH: 14 PATCH, EXTENDED RELEASE TRANSDERMAL at 08:45

## 2023-07-04 RX ADMIN — DILTIAZEM HYDROCHLORIDE 90 MG: 30 TABLET, FILM COATED ORAL at 06:13

## 2023-07-04 RX ADMIN — IPRATROPIUM BROMIDE 0.5 MG: 0.5 SOLUTION RESPIRATORY (INHALATION) at 15:17

## 2023-07-04 RX ADMIN — APIXABAN 5 MG: 5 TABLET, FILM COATED ORAL at 21:15

## 2023-07-04 RX ADMIN — Medication 15 ML: at 08:42

## 2023-07-04 RX ADMIN — INSULIN ASPART 3 UNITS: 100 INJECTION, SOLUTION INTRAVENOUS; SUBCUTANEOUS at 15:53

## 2023-07-04 ASSESSMENT — ACTIVITIES OF DAILY LIVING (ADL)
ADLS_ACUITY_SCORE: 32
ADLS_ACUITY_SCORE: 32
ADLS_ACUITY_SCORE: 36
ADLS_ACUITY_SCORE: 32
ADLS_ACUITY_SCORE: 36

## 2023-07-04 NOTE — PROGRESS NOTES
Inpatient Cardiology Consultation Progress Note:    Lara Melendez MRN#: [id number]   YOB: 1965 Age: 57 year old     Date of Admission: 6/20/2023  Consult indication: atrial fibrillation          Assessment and Plan:     # Paroxysmal/persistent atrial fibrillation in setting of acute on chronic hypoxic hypercarbic respiratory failure requiring intubation and mechanical ventilation, underlying severe COPD on supple O2, now with pneumothorax s/p chest tube placement, possible pneumonia. Required DCCV x2 with subsequent intermittent atrial fibrillation with RVR, now back in sinus rhythm    TTE 3/2023 LVEF >70%, normal RV function, no significant valvular abnormalities    - continue diltiazem 90mg q6h with hold parameters  - continue amiodarone 400mg BID for 9 days (total) to finish the load then 200mg daily  - continue apixaban  - agree with diuresis, will need to monitor renal function  - will hold digoxin for now since HR while in atrial fibrillation are in the 110 bpm range, also previously did have issues with hyperkalemia. Can restart digoxin if needed   - Cardiac telemetry.  Monitor electrolytes daily, maintain potassium greater than 4, magnesium greater than 2  - Cardiology will follow     Thank you for allowing our team to participate in the care of Lara Melendez.  Please do not hesitate to page me with any questions or concerns.     Alexis Hough MD, [facility]  Cardiology  July 4, 2023    Voice recognition software utilized.     Moderate complexity          Interval Events:     - no acute events overnight  - interval labs and studies reviewed   - interval progress notes reviewed         Medications reviewed:     Current medications:  Current Facility-Administered Medications Ordered in Epic   Medication Dose Route Frequency Last Rate Last Admin     acetaminophen (TYLENOL) solution 650 mg  650 mg Per Feeding Tube Q4H PRN   650 mg at 07/02/23 1349    Or     acetaminophen (TYLENOL)  Suppository 650 mg  650 mg Rectal Q4H PRN         [START ON 7/12/2023] amiodarone (PACERONE) tablet 200 mg  200 mg Oral Daily         amiodarone (PACERONE) tablet 400 mg  400 mg Oral BID   400 mg at 07/04/23 0842     apixaban ANTICOAGULANT (ELIQUIS) tablet 5 mg  5 mg Per Feeding Tube BID   5 mg at 07/04/23 0842     atorvastatin (LIPITOR) tablet 20 mg  20 mg Per Feeding Tube Daily   20 mg at 07/04/23 0842     ceFEPIme (MAXIPIME) 2 g vial to attach to  ml bag for ADULTS or 50 ml bag for PEDS  2 g Intravenous Q8H   2 g at 07/04/23 1045     cyclobenzaprine (FLEXERIL) tablet 5 mg  5 mg Per Feeding Tube Q8H PRN         glucose gel 15-30 g  15-30 g Oral Q15 Min PRN        Or     dextrose 50 % injection 25-50 mL  25-50 mL Intravenous Q15 Min PRN        Or     glucagon injection 1 mg  1 mg Subcutaneous Q15 Min PRN         diltiazem (CARDIZEM) tablet 90 mg  90 mg Oral or Feeding Tube Q6H ELZBIETA   90 mg at 07/04/23 1152     sennosides (SENOKOT) syrup 5-10 mL  5-10 mL Per Feeding Tube BID PRN   10 mL at 06/28/23 0554    And     docusate (COLACE) 50 MG/5ML liquid  mg   mg Per Feeding Tube BID PRN   100 mg at 06/26/23 0820     fentaNYL (SUBLIMAZE) 50 mcg/mL bolus from pump  50 mcg Intravenous Q1H PRN   50 mcg at 06/29/23 2222     fentaNYL (SUBLIMAZE) infusion   mcg/hr Intravenous Continuous 1 mL/hr at 07/04/23 1400 50 mcg/hr at 07/04/23 1400     flumazenil (ROMAZICON) injection 0.2 mg  0.2 mg Intravenous q1 min prn         hydrALAZINE (APRESOLINE) injection 10 mg  10 mg Intravenous Q6H PRN   10 mg at 06/27/23 0959     insulin aspart (NovoLOG) injection (RAPID ACTING)  1-6 Units Subcutaneous Q4H   2 Units at 07/04/23 1159     ipratropium (ATROVENT) 0.02 % neb solution 0.5 mg  0.5 mg Nebulization 4x daily   0.5 mg at 07/04/23 1517     lactated ringers infusion   Intravenous Continuous   Stopped at 06/28/23 0011     levalbuterol (XOPENEX) neb solution 0.63 mg  0.63 mg Nebulization 4x Daily   0.63 mg at 07/04/23  1517     levalbuterol (XOPENEX) neb solution 0.63 mg  0.63 mg Nebulization Q2H PRN   0.63 mg at 06/22/23 0426     levothyroxine (SYNTHROID/LEVOTHROID) tablet 112 mcg  112 mcg Per Feeding Tube Daily   112 mcg at 07/04/23 0842     methylPREDNISolone sodium succinate (solu-MEDROL) injection 40 mg  40 mg Intravenous Q24H   40 mg at 07/04/23 0842     midazolam (VERSED) drip - ADULT 100 mg/100 mL in NS (pre-mix)  1-8 mg/hr Intravenous Continuous 3 mL/hr at 07/04/23 1400 3 mg/hr at 07/04/23 1400     midazolam (VERSED) injection 2 mg  2 mg Intravenous Q1H PRN   2 mg at 07/02/23 0942     multivitamins w/minerals liquid 15 mL  15 mL Per Feeding Tube Daily   15 mL at 07/04/23 0842     naloxone (NARCAN) injection 0.2 mg  0.2 mg Intravenous Q2 Min PRN        Or     naloxone (NARCAN) injection 0.4 mg  0.4 mg Intravenous Q2 Min PRN        Or     naloxone (NARCAN) injection 0.2 mg  0.2 mg Intramuscular Q2 Min PRN        Or     naloxone (NARCAN) injection 0.4 mg  0.4 mg Intramuscular Q2 Min PRN         nicotine (NICODERM CQ) 14 MG/24HR 24 hr patch 1 patch  1 patch Transdermal Daily   1 patch at 07/04/23 0845     nicotine Patch in Place   Transdermal Q8H         ondansetron (ZOFRAN ODT) ODT tab 4 mg  4 mg Oral Q6H PRN        Or     ondansetron (ZOFRAN) injection 4 mg  4 mg Intravenous Q6H PRN         pantoprazole (PROTONIX) 2 mg/mL suspension 40 mg  40 mg Per Feeding Tube Daily   40 mg at 07/04/23 0845     PARoxetine (PAXIL) suspension 20 mg  20 mg Per Feeding Tube Daily   20 mg at 07/04/23 0844     Patient is already receiving anticoagulation with heparin, enoxaparin (LOVENOX), warfarin (COUMADIN)  or other anticoagulant medication   Does not apply Continuous PRN         phenylephrine (TONY-SYNEPHRINE) 50 mg in NaCl 0.9 % 250 mL infusion  0.1-6 mcg/kg/min Intravenous Continuous   Stopped at 07/02/23 1147     polyethylene glycol (MIRALAX) Packet 17 g  17 g Oral Daily PRN   17 g at 06/27/23 0456     prochlorperazine (COMPAZINE)  injection 10 mg  10 mg Intravenous Q6H PRN        Or     prochlorperazine (COMPAZINE) tablet 10 mg  10 mg Oral Q6H PRN        Or     prochlorperazine (COMPAZINE) suppository 25 mg  25 mg Rectal Q12H PRN         propofol (DIPRIVAN) infusion  5-75 mcg/kg/min Intravenous Continuous   Stopped at 23 1130     protein modular (PROSOURCE TF20) packet 1 packet  1 packet Per Feeding Tube Daily   1 packet at 23 0845     sodium chloride (PF) 0.9% PF flush 10-20 mL  10-20 mL Intracatheter q1 min prn         sodium chloride (PF) 0.9% PF flush 10-40 mL  10-40 mL Intracatheter Once PRN         sodium chloride (PF) 0.9% PF flush 10-40 mL  10-40 mL Intracatheter Q8H   10 mL at 23 1527     sodium chloride (PF) 0.9% PF flush 10-40 mL  10-40 mL Intracatheter Q1H PRN         sodium chloride (PF) 0.9% PF flush 3 mL  3 mL Intracatheter Q8H   3 mL at 23 1528     sodium chloride (PF) 0.9% PF flush 3 mL  3 mL Intracatheter q1 min prn         No current [facility]-ordered outpatient medications on file.             Physical Exam:   Vital signs were reviewed:  Temperatures:  Current - Temp: 98.4  F (36.9  C); Max - Temp  Av.6  F (37.6  C)  Min: 98.2  F (36.8  C)  Max: 101.3  F (38.5  C)  Respiration range: Resp  Avg: 15.7  Min: 12  Max: 19  Pulse range: Pulse  Av.7  Min: 74  Max: 168  Blood pressure range: Systolic (24hrs), Av , Min:147 , Max:147   ; Diastolic (24hrs), Av, Min:52, Max:52    Pulse oximetry range: SpO2  Av.2 %  Min: 88 %  Max: 100 %    Intake/Output Summary (Last 24 hours) at 7/3/2023 1028  Last data filed at 7/3/2023 0950  Gross per 24 hour   Intake 4696.66 ml   Output 4890 ml   Net -193.34 ml     159 lbs 6.28 oz  Body mass index is 28.24 kg/m .   Body surface area is 1.79 meters squared.    General/Constitutional: appears stated age, intubated and sedated  Head: normocephalic, atraumatic  Respiratory: diminished breath sounds bilaterally, mechanically ventilated  Cardiovascular:  regular rate, regular rhythm, no obvious murmurs, no lower extremity edema  Gastrointestinal: non-rigid          Selected laboratory tests:   Laboratory test results personally reviewed:   [facility]  Recent Labs   Lab 07/04/23  1154 07/04/23  0804 07/04/23  0530 07/04/23  0350 07/04/23  0107 07/03/23 1955 07/03/23 1951 07/03/23  1723 07/03/23  1210 07/03/23  0413 07/03/23  0200 07/02/23 2003 07/02/23  1725 07/02/23  1635 07/02/23  1350 07/02/23  0732 07/02/23  0538 07/01/23  0808 07/01/23  0601 06/30/23  1612 06/30/23  1327   NA  --   --  138  --   --   --   --   --  141  --  147*  --   --   --  149*  --  152*  --  151*  --  151*   POTASSIUM  --   --  3.9  --  4.3  --   --   --  3.8  --  3.5  --   --   --  4.6  --  3.7   < > 3.5   < > 4.6   CHLORIDE  --   --  80*  --   --   --   --   --  85*  --  97*  --   --   --  98  --  104  --  108*  --  102   CO2  --   --  >50*  --   --   --   --   --  >50*  --  49*  --   --   --  48*  --  45*  --  41*  --  44*   ANIONGAP  --   --   --   --   --   --   --   --   --   --  1*  --   --   --  3*  --  3*  --  2*  --  5*   * 148* 138* 128*  --    < >  --    < > 200*   < > 124*   < >  --    < > 224*   < > 121*   < > 133*   < > 203*   BUN  --   --  33.9*  --   --   --   --   --  31.4*  --  30.5*  --   --   --  37.3*  --  37.0*  --  41.9*  --  38.7*   CR  --   --  0.52  --   --   --   --   --  0.43*  --  0.41*  --   --   --  0.49*  --  0.53  --  0.57  --  0.52   GFRESTIMATED  --   --  >90  --   --   --   --   --  >90  --  >90  --   --   --  >90  --  >90  --  >90  --  >90   EDNI  --   --  8.9  --   --   --   --   --  8.1*  --  7.4*  --   --   --  8.3*  --  8.0*  --  7.5*  --  8.6   MAG  --   --  2.1  --   --   --   --   --  2.1  --  1.8  --   --   --  1.9  --  2.2  --  2.5*   < > 2.4*   PHOS  --   --  2.7  --   --   --  2.9  --   --   --  2.2*  --  2.9  --   --   --  1.6*  --  3.6  --   --    PROTTOTAL  --   --   --   --   --   --   --   --   --   --   --   --   --   --   --   --   --    --   --   --  5.7*   ALBUMIN  --   --   --   --   --   --   --   --   --   --   --   --   --   --   --   --   --   --   --   --  3.3*   BILITOTAL  --   --   --   --   --   --   --   --   --   --   --   --   --   --   --   --   --   --   --   --  0.3   ALKPHOS  --   --   --   --   --   --   --   --   --   --   --   --   --   --   --   --   --   --   --   --  42   AST  --   --   --   --   --   --   --   --   --   --   --   --   --   --   --   --   --   --   --   --  43   ALT  --   --   --   --   --   --   --   --   --   --   --   --   --   --   --   --   --   --   --   --  53*    < > = values in this interval not displayed.     CBC  Recent Labs   Lab 07/04/23  0530 07/03/23  0200 07/02/23  0538 07/01/23  1040 07/01/23  0601   WBC 16.2* 13.3* 14.2*  --  10.2   RBC 3.00* 2.27* 2.40*  --  2.31*   HGB 10.0* 7.6* 8.0* 8.8* 7.7*   HCT 30.9* 25.1* 26.7*  --  25.7*   * 111* 111*  --  111*   MCH 33.3* 33.5* 33.3*  --  33.3*   MCHC 32.4 30.3* 30.0*  --  30.0*   RDW 13.8 14.0 13.7  --  14.2   * 91* 106*  --  84*     INRNo lab results found in last 7 days.  No results found for: TROPI, TROPONIN, TROPR, TROPN  Recent Labs   Lab Test 06/30/23  0505 06/29/23  1638   TRIG 97 76     Lab Results   Component Value Date    A1C 4.9 03/26/2023    A1C 4.4 12/01/2022     TSH   Date Value Ref Range Status   07/02/2023 0.31 0.30 - 4.20 uIU/mL Final   01/29/2006 4.68 0.4 - 5.0 mU/L Final

## 2023-07-04 NOTE — PROGRESS NOTES
[facility] ICU VENTILATOR RESPIRATORY NOTE  Date of Admission: 06/20/23  Date of Intubation (most recent): 06/21/23  Reason for Mechanical Ventilation: Respiratory failure  Number of Days on Mechanical Ventilation: 14  Met Criteria for Pressure Support Trial: Yes  Length of Pressure Support Trial: 1 hour on PS 8/+5 @ 40% FiO2  Reason for Stopping Pressure Support Trial: End of designated trial time   Significant Events Today: Pt tolerated weaning well: Spont RR 14, Spont , Spont VE 4.89. RSBI: 34.15.   ABG Results:   Recent Labs   Lab 07/03/23  0950 07/02/23  0751 07/01/23  0808 06/30/23  0933   PH 7.45 7.42 7.27* 7.35   PCO2 84* 81* 103* 85*   PO2 54* 71* 197* 64*   HCO3 59* 52* 47* 47*   O2PER 40 50 70 40       Plan:  Continue to monitor and assess pt's respiratory status    Vent Mode: (S) CMV/AC  (Continuous Mandatory Ventilation/ Assist Control)  FiO2 (%): 40 %  Resp Rate (Set): 18 breaths/min  Tidal Volume (Set, mL): 310 mL  PEEP (cm H2O): 5 cmH2O  Pressure Support (cm H2O): (S) 8 cmH2O  Inspiratory Pressure (cm H2O) (Drager Norma): 45  Resp: 11      Ulices Escalante, RT

## 2023-07-04 NOTE — PROVIDER NOTIFICATION
07/04/23 0800   Critical Test Results/Notification   Critical Lab Result (Lab Name and Value) bicarb levl 60   What Time Did The Lab Notify You? 0738   Provider Notified yes   Date of Provider Notification 07/04/23   Time of Provider Notification 0740   Mechanism of Provider notification verbal (face-to-face)   What Provider Did You Notify? Amin   Response orders obtained

## 2023-07-04 NOTE — PLAN OF CARE
Goal Outcome Evaluation:      Plan of Care Reviewed With: patient, spouse, family    Overall Patient Progress: improvingOverall Patient Progress: improving             ICU End of Shift Summary.  For vital signs and complete assessments, please see documentation flowsheets.      Pertinent assessments:   Neuro: sedated with propofol and Fentanyl, RASS -1  CPOT 0,   Cardiac: tele; SR with frequent PACs. Few runs of SVT that's sustains for seconds .  Resp: on full vent support, FIO2 40%, RR 18,  and peep of 5 , small secretions suctioned through ETT, Chest Tube WDL  GI: abdomen . Soft, +BS, TF at goal  : El WDL, good UOP  Skin: no changes  Lines: PICC, PIV,& chest tube  Drips: Fentanyl and propofol     Major Shift Events:        Blood gas completed, Vent setting modified    Lasix gtt stopped    Fentanyl lowered to half    Cards consulted, see notes    Water flushes decreased    Solumedrol modified    Art line removed    Mag check 2.1,  2 g mag given    Venous blood Gas completed    Versed titrated off  and propofol restarted    Insulin modified to higher scale  Plan (Upcoming Events): continue vent management, wean as able, continue Steroids, nebs,  IV abx, follow up labs and Cx  Discharge/Transfer Needs: TBD     Bedside Shift Report Completed : yes  Bedside Safety Check Completed: yes     Notified provider about indwelling el catheter discussed removal or continued need.     Did provider choose to remove indwelling el catheter? no     Provider's el indication for keeping indwelling el catheter: Indication for continued use: Strict 1-2 Hour I & O if external catheters are not an option     Is there an order for indwelling el catheter? YES     *If there is a plan to keep el catheter in place at discharge daily notification with provider is not necessary, but please add a notation in the treatment team sticky note that the patient will be discharging with the catheter.

## 2023-07-04 NOTE — PROGRESS NOTES
[facility] ICU VENTILATOR RESPIRATORY NOTE    Date of Admission: 6/20/23    Date of Intubation (most recent): 6/21/23    Reason for Mechanical Ventilation: Respiratory failure    Number of Days on Mechanical Ventilation: 13    Met Criteria for Pressure Support Trial: yes    Reason for No Pressure Support Trial: Done on night shift this AM for 1 hour.    ABG Results:   Recent Labs   Lab 07/04/23  1547 07/04/23  0738 07/03/23  0950 07/02/23  0751 07/01/23  0808   PH  --  7.55* 7.45 7.42 7.27*   PCO2  --  69* 84* 81* 103*   PO2  --  83 54* 71* 197*   HCO3  --  60* 59* 52* 47*   O2PER 40 40 40 50 70     ETT appearance on chest x-ray: 5.4 cm above stevie    Plan:  Patient remains on ventilator with settings of:  Vent Mode: CMV/AC  (Continuous Mandatory Ventilation/ Assist Control)  FiO2 (%): 40 %  Resp Rate (Set): 18 breaths/min  Tidal Volume (Set, mL): 330 mL  PEEP (cm H2O): 5 cmH2O  Pressure Support (cm H2O): (S) 8 cmH2O  Inspiratory Pressure (cm H2O) (Drager Norma): 45  Resp: 17

## 2023-07-04 NOTE — PROGRESS NOTES
Critical Care  Note      07/04/2023    Name: Lara Melendez MRN#: [id number]   Age: 57 year old YOB: 1965     Hsptl Day# 13  ICU DAY # 13    MV DAY # 13             Problem List:   Principal Problem:    Paroxysmal atrial fibrillation with RVR (H)  Active Problems:    COPD exacerbation (H)    Anticoagulated    Acute and chronic respiratory failure with hypercapnia (H)           Summary/Hospital Course:   57-year-old female with history of COPD, emphysema, O2 dependent at home, tobacco dependence, hypertension, anxiety, atrial fibrillation on Eliquis, hypothyroidism presents with worsening shortness of breath and wheezing.  She appears to be in status asthmaticus versus worsening COPD exacerbation.  Patient was prescribed azithromycin and prednisone prior to admission.  She continued decline was brought to EMS for evaluation.  Where she was given IV steroids, IV magnesium nebulizers and BiPAP.  After admission to the ICU she failed a BiPAP trial and required intubation mechanical ventilation.  She had significant bronchospasm with high airway pressures requiring deep sedation with fentanyl ketamine and propofol and addition of a paralytic.  Events of last 24 hours noted for episodes of RVR with associated atrial fibrillation and SVT.  Patient was cardioverted back into sinus rhythm with frequent PACs. .  She was weaned off vecuronium.  She did have an episode of A-fib with RVR with relative soft pressures for which she underwent a cardioversion which converted her to atrial fib with rate control.  We are weaning her propofol actively and phenylephrine also.    Assessment and plan :     Lara Melendez IS a 57 year old female admitted on 6/20/2023 for, acute respiratory failure secondary to COPD exacerbation, small left-sided pneumothorax and history of atrial fibrillation on Eliquis..   I have personally reviewed the daily labs, imaging studies, cultures and discussed the case with referring physician  and consulting physicians.     My assessment and plan by system for this patient is as follows:    Neurology/Psychiatry:   1. Sedation  2  Pain Mgt  3. ICU Delirium     Plan    Continue Versed; continue to wean with SAT today.  On fentanyl drip: continue to wean down with SAT today  RASS goal 0-1    Cardiovascular:   1.Hemodynamics -normotensive to hypertensive  2.Rhythm sinus with PACs  3. Ischemia -no signs of ischemia  4. A-fib with episodes of RVR: required cardioversion this admission.  Plan  Rate controlled  Appreciate cardiology input  Continue p.o. amiodarone.   Continue po cardizem  Mag >2  K+ >4  Discontinue a-line today  On Apixiban for anticoagulation    Pulmonary/Ventilator Management:   1. Airway intubated for acute hypoxic and hypercapnic respiratory failure  2. Oxygenation/ventilation/mechanics on full mechanical ventilatory support  Plan  -Overall her respiratory status seems to be slowly improving.  We were able to increase tidal volumel Her airway pressures have not significantly increased and her compensated respiratory acidosis has improved.      Vent Mode: CMV/AC  (Continuous Mandatory Ventilation/ Assist Control)  FiO2 (%): 40 %  Resp Rate (Set): 18 breaths/min  Tidal Volume (Set, mL): 330 mL  PEEP (cm H2O): 5 cmH2O  Pressure Support (cm H2O): (S) 8 cmH2O  Inspiratory Pressure (cm H2O) (Drager Norma): 45  Resp: 17    Continue her on scheduled nebs and steroids.    Continue chest tube until patient is extubated.   If patient does not make any further progress would need tracheostomy later this week.    GI and Nutrition :   1.  Continue tube feeds at goal   - Patient does not meet criteria for malnutrition at this time    Plan  -continue enteral feeds.    Renal/Fluids/Electrolytes:   1.  No acute renal injury at this time  2.  Hypernatremia: resolved  3.  UnCompensated metabolic alkalosis: suspect component of contraction alkalosis from recent diuresis  4.  Appears euvolemic  Plan  - Discontinue  200q4 free water flush  - discontinue Lasix drip today  - monitor function and electrolytes as needed with replacement per ICU protocols. - generally avoid nephrotoxic agents such as NSAID, IV  contrast unless specifically required  - adjust medications as needed for renal clearance  - follow I/O's as appropriate.      Infectious Disease:   1.  Sepsis   UTI: Pseudomonas aeruginosa   positive sputum culture: Klebsiella oxytoca    Plan  -Continue patient on cefepime while awaiting antibiotic sensitivities.      Endocrine:   1.  Concern for stress-induced hyperglycemia  2.  Concern for diabetes induced hyperglycemia  3.  Plan  - ICU insulin protocol, goal sugar <180      Hematology/Oncology:   1.  Leukocytosis  2.  Anemia: multifactorial  3.  Medication induced coagulopathy: On Eliquis for atrial fibrillation  Plan  -Continue to monitor     IV/Access:   1. Venous access -central access  2. Arterial access - discontinue a-line today  3.  Plan  - central access required and necessary      ICU Prophylaxis:   1. DVT: On Eliquis  2. VAP: HOB 30 degrees, chlorhexidine rinse  3. Stress Ulcer: PPI/H2 blocker  4. Restraints: Nonviolent soft two point restraints required and necessary for patient safety and continued cares and good effect as patient continues to pull at necessary lines, tubes despite education and distraction. Will readdress daily.   5. Wound care  -local wound care  6. Feeding -continue tube feeds  7. Family Update:   8. Disposition -ICU        Key goals for next 24 hours:   1.  Continue on vent with SBT  2.  Wean sedation as tolerated with SAT  3.  Hold diuresis today               Medical History:     Past Medical History:   Diagnosis Date     Anxiety      COPD (chronic obstructive pulmonary disease) - 2L home O2      Diverticulitis of colon      Hypercholesterolemia      Hypertension      Hypothyroidism      Paroxysmal atrial fibrillation      Psoriasis      Pulmonary nodule - left upper lobe      Past  Surgical History:   Procedure Laterality Date     CHOLECYSTECTOMY       INCISION AND DRAINAGE MANDIBLE, COMBINED Left 01/10/2022    Procedure: INCISION AND DRAINAGE, MANDIBLE;  Surgeon: Jn Feliz DDS;  Location: [facility] OR     INCISION AND DRAINAGE MANDIBLE, COMBINED Left 02/04/2022    Procedure: INCISION AND DRAINAGE, MANDIBLE;  Surgeon: Taylor Ortez DDS;  Location: [facility] OR     TOOTH EXTRACTION       Vocal Cord surgery       Social History     Socioeconomic History     Marital status:      Spouse name: Not on file     Number of children: Not on file     Years of education: Not on file     Highest education level: Not on file   Occupational History     Not on file   Tobacco Use     Smoking status: Never     Smokeless tobacco: Never   Substance and Sexual Activity     Alcohol use: Yes     Comment: occ     Drug use: Never     Sexual activity: Not Currently   Other Topics Concern     Not on file   Social History Narrative     Not on file     Social Determinants of Health     Financial Resource Strain: Not on file   Food Insecurity: Not on file   Transportation Needs: Not on file   Physical Activity: Not on file   Stress: Not on file   Social Connections: Not on file   Intimate Partner Violence: Not on file   Housing Stability: Not on file        Allergies   Allergen Reactions     Sulfa Antibiotics      Doxycycline Other (See Comments)     Develops chest tightness  Intolerance not allergy     Penicillins Rash     Generalized rash  Rash, Generalized                Key Medications:       [START ON 7/12/2023] amiodarone  200 mg Oral Daily     amiodarone  400 mg Oral BID     apixaban ANTICOAGULANT  5 mg Per Feeding Tube BID     atorvastatin  20 mg Per Feeding Tube Daily     ceFEPIme  2 g Intravenous Q8H     diltiazem  90 mg Oral or Feeding Tube Q6H [facility]     insulin aspart  1-6 Units Subcutaneous Q4H     ipratropium  0.5 mg Nebulization 4x daily     levalbuterol  0.63 mg Nebulization 4x Daily     levothyroxine  112  mcg Per Feeding Tube Daily     magnesium sulfate  2 g Intravenous Once     methylPREDNISolone  40 mg Intravenous Q24H     multivitamins w/minerals  15 mL Per Feeding Tube Daily     nicotine  1 patch Transdermal Daily     nicotine   Transdermal Q8H     pantoprazole  40 mg Per Feeding Tube Daily     PARoxetine  20 mg Per Feeding Tube Daily     potassium & sodium phosphates  1 packet Oral or Feeding Tube Q4H     protein modular  1 packet Per Feeding Tube Daily     sodium chloride (PF)  10-40 mL Intracatheter Q8H     sodium chloride (PF)  3 mL Intracatheter Q8H       fentaNYL 100 mcg/hr (07/04/23 0600)     lactated ringers Stopped (06/28/23 0011)     midazolam 3 mg/hr (07/04/23 0600)     - MEDICATION INSTRUCTIONS -       phenylephrine Stopped (07/02/23 1147)     propofol Stopped (07/02/23 1130)        Home Meds  No current facility-administered medications on file prior to encounter.  albuterol (PROAIR HFA/PROVENTIL HFA/VENTOLIN HFA) 108 (90 Base) MCG/ACT inhaler, Inhale 2 puffs into the lungs every 4 hours as needed for shortness of breath / dyspnea or wheezing Inhale 1-2 Puffs every 4 hours as needed for Wheezing.  albuterol (PROVENTIL) (2.5 MG/3ML) 0.083% neb solution, Take 1 vial (2.5 mg) by nebulization every 4 hours as needed for shortness of breath or wheezing  apixaban ANTICOAGULANT (ELIQUIS) 5 MG tablet, Take 1 tablet (5 mg) by mouth 2 times daily  atorvastatin (LIPITOR) 20 MG tablet, Take 20 mg by mouth daily  clonazePAM (KLONOPIN) 0.5 MG tablet, Take 0.5 mg by mouth daily  cyclobenzaprine (FLEXERIL) 5 MG tablet, Take 1 tablet (5 mg) by mouth every 8 hours as needed for muscle spasms  diclofenac (VOLTAREN) 1 % topical gel, Apply 2 g topically 4 times daily  diltiazem ER COATED BEADS (CARDIZEM CD/CARTIA XT) 240 MG 24 hr capsule, Take 1 capsule (240 mg) by mouth daily  fluticasone-salmeterol (ADVAIR-HFA) 230-21 MCG/ACT inhaler, Inhale 2 puffs into the lungs 2 times daily  ipratropium - albuterol 0.5 mg/2.5 mg/3  mL (DUONEB) 0.5-2.5 (3) MG/3ML neb solution, Take 1 vial (3 mLs) by nebulization every 6 hours as needed for shortness of breath / dyspnea or wheezing  levothyroxine (SYNTHROID/LEVOTHROID) 112 MCG tablet, Take 112 mcg by mouth daily  losartan (COZAAR) 25 MG tablet, Take 1 tablet (25 mg) by mouth daily  nicotine (NICODERM CQ) 21 MG/24HR 24 hr patch, Place 1 patch onto the skin daily  PARoxetine (PAXIL) 20 MG tablet, Take 20 mg by mouth daily  predniSONE (DELTASONE) 20 MG tablet, Take 60 mg for 2 days, then 40 mg for 3 days, then 20 mg for 3 days, then 10 mg for 3 days, then stop  tiotropium (SPIRIVA RESPIMAT) 2.5 MCG/ACT inhaler, Inhale 2 puffs into the lungs daily as needed               Physical Examination:   Temp:  [98.8  F (37.1  C)-99.5  F (37.5  C)] 99  F (37.2  C)  Pulse:  [] 79  Resp:  [10-21] 17  BP: (107-139)/(51-78) 125/64  MAP:  [58 mmHg-85 mmHg] 65 mmHg  Arterial Line BP: ()/(39-70) 89/43  FiO2 (%):  [40 %-45 %] 40 %  SpO2:  [86 %-100 %] 97 %    Intake/Output Summary (Last 24 hours) at 6/26/2023 1215  Last data filed at 6/26/2023 1200  Gross per 24 hour   Intake 2195.19 ml   Output 4555 ml   Net -2359.81 ml     Wt Readings from Last 4 Encounters:   07/04/23 72.3 kg (159 lb 6.3 oz)   04/01/23 68.8 kg (151 lb 9.6 oz)   02/11/23 60 kg (132 lb 4.4 oz)   12/02/22 61.3 kg (135 lb 2.3 oz)     Arterial Line BP: ()/(39-70) 89/43  MAP:  [58 mmHg-85 mmHg] 65 mmHg  BP - Mean:  [] 77  Vent Mode: CMV/AC  (Continuous Mandatory Ventilation/ Assist Control)  FiO2 (%): 40 %  Resp Rate (Set): 18 breaths/min  Tidal Volume (Set, mL): 330 mL  PEEP (cm H2O): 5 cmH2O  Pressure Support (cm H2O): (S) 8 cmH2O  Inspiratory Pressure (cm H2O) (Drager Norma): 45  Resp: 17    Recent Labs   Lab 07/04/23  0738 07/03/23  0950 07/02/23  0751 07/01/23  0808   PH 7.55* 7.45 7.42 7.27*   PCO2 69* 84* 81* 103*   PO2 83 54* 71* 197*   HCO3 60* 59* 52* 47*   O2PER 40 40 50 70       GEN: no acute distress consistent with  chemical sedation  HEENT: head ncat, sclera anicteric, OP patent, trachea midline   PULM: unlabored synchronous with vent, diffuse wheezes anteriorly but improving overall however wheezes increased this morning, chest tube in good position show signs of titling however no air leak  CV/COR: Irregular rate and rhythm S1S2 no gallop,  No rub, no murmur  ABD: soft nontender, hypoactive bowel sounds, no mass  EXT:  Edema   warm  NEURO: Does not follow commands, consistent with chemical sedation  SKIN: no obvious rash  LINES: clean, dry intact         Data:   All data and imaging reviewed     ROUTINE ICU LABS (Last four results)  CMP  Recent Labs   Lab 07/04/23  0804 07/04/23  0530 07/04/23  0350 07/04/23  0107 07/03/23  2336 07/03/23  1955 07/03/23  1951 07/03/23  1723 07/03/23  1210 07/03/23  0413 07/03/23  0200 07/02/23  2003 07/02/23  1725 07/02/23  1635 07/02/23  1350 07/02/23  0732 07/02/23  0538 07/01/23  0808 07/01/23  0601 06/30/23  1612 06/30/23  1327   NA  --  138  --   --   --   --   --   --  141  --  147*  --   --   --  149*  --  152*  --  151*  --  151*   POTASSIUM  --  3.9  --  4.3  --   --   --   --  3.8  --  3.5  --   --   --  4.6  --  3.7   < > 3.5   < > 4.6   CHLORIDE  --  80*  --   --   --   --   --   --  85*  --  97*  --   --   --  98  --  104  --  108*  --  102   CO2  --  >50*  --   --   --   --   --   --  >50*  --  49*  --   --   --  48*  --  45*  --  41*  --  44*   ANIONGAP  --   --   --   --   --   --   --   --   --   --  1*  --   --   --  3*  --  3*  --  2*  --  5*   * 138* 128*  --  149*   < >  --    < > 200*   < > 124*   < >  --    < > 224*   < > 121*   < > 133*   < > 203*   BUN  --  33.9*  --   --   --   --   --   --  31.4*  --  30.5*  --   --   --  37.3*  --  37.0*  --  41.9*  --  38.7*   CR  --  0.52  --   --   --   --   --   --  0.43*  --  0.41*  --   --   --  0.49*  --  0.53  --  0.57  --  0.52   GFRESTIMATED  --  >90  --   --   --   --   --   --  >90  --  >90  --   --   --  >90  --   >90  --  >90  --  >90   EDIN  --  8.9  --   --   --   --   --   --  8.1*  --  7.4*  --   --   --  8.3*  --  8.0*  --  7.5*  --  8.6   MAG  --  2.1  --   --   --   --   --   --  2.1  --  1.8  --   --   --  1.9  --  2.2  --  2.5*   < > 2.4*   PHOS  --  2.7  --   --   --   --  2.9  --   --   --  2.2*  --  2.9  --   --   --  1.6*  --  3.6  --   --    PROTTOTAL  --   --   --   --   --   --   --   --   --   --   --   --   --   --   --   --   --   --   --   --  5.7*   ALBUMIN  --   --   --   --   --   --   --   --   --   --   --   --   --   --   --   --   --   --   --   --  3.3*   BILITOTAL  --   --   --   --   --   --   --   --   --   --   --   --   --   --   --   --   --   --   --   --  0.3   ALKPHOS  --   --   --   --   --   --   --   --   --   --   --   --   --   --   --   --   --   --   --   --  42   AST  --   --   --   --   --   --   --   --   --   --   --   --   --   --   --   --   --   --   --   --  43   ALT  --   --   --   --   --   --   --   --   --   --   --   --   --   --   --   --   --   --   --   --  53*    < > = values in this interval not displayed.     CBC  Recent Labs   Lab 07/04/23  0530 07/03/23  0200 07/02/23  0538 07/01/23  1040 07/01/23  0601   WBC 16.2* 13.3* 14.2*  --  10.2   RBC 3.00* 2.27* 2.40*  --  2.31*   HGB 10.0* 7.6* 8.0* 8.8* 7.7*   HCT 30.9* 25.1* 26.7*  --  25.7*   * 111* 111*  --  111*   MCH 33.3* 33.5* 33.3*  --  33.3*   MCHC 32.4 30.3* 30.0*  --  30.0*   RDW 13.8 14.0 13.7  --  14.2   * 91* 106*  --  84*     INR  No lab results found in last 7 days.  Arterial Blood Gas  Recent Labs   Lab 07/04/23  0738 07/03/23  0950 07/02/23  0751 07/01/23  0808   PH 7.55* 7.45 7.42 7.27*   PCO2 69* 84* 81* 103*   PO2 83 54* 71* 197*   HCO3 60* 59* 52* 47*   O2PER 40 40 50 70       All cultures:  No results for input(s): CULT in the last 168 hours.  Recent Results (from the past 24 hour(s))   XR Chest Port 1 View    Narrative    CHEST ONE VIEW  6/26/2023 9:29 AM     HISTORY: Increasing  oxygen requirements and airway pressure.    COMPARISON: June 23, 2023      Impression    IMPRESSION: Endotracheal tube tip 4.3 cm from the stevie. Right PICC  line stable. Minimal pleural fluid or pleural thickening bilaterally  similar to previous. Small-to-moderate pneumothorax on the left new  from previous.    Results called to the patient's nurse on June 26, 2023 at 9:44 AM.     GHAZALA ORELLANA MD         SYSTEM ID:  [id number]         Billing: This patient is critically ill: yes. Total critical care time today 50 min.            Ghazala Mackey DO  Surgical Critical Care Medicine

## 2023-07-04 NOTE — PLAN OF CARE
ICU End of Shift Summary.  For vital signs and complete assessments, please see documentation flowsheets.      Pertinent assessments: Pt opening eyes more often tonight but still not following commands or tracking. Remains on Versed and Fentanyl for sedation. CPOT 0. LSs coarse/diminished to start the shift, suctioning small amounts of thin, cloudy secretions from ET tube. Improved to just diminished by AM. Weaned FiO2 to 40% from 50%. Hines with 4, 910 mL of urine out on Lasix drip. Weight down almost 4 kg from yesterday. Potassium recheck mid shift with no replacement needed. 2 medium, watery BMs. Tolerating tube feeds at goal rate. Edema remains unchanged.  Major Shift Events: A-line dampened. MD aware and plan to attempt to fix line today. ABG not drawn - reassess when line is assessed.         - Replacing Phos with recheck in AM.  Plan (Upcoming Events): Continue vent support and wean as able. Possible trach in the coming days if unable to wean. Continue to wean sedation as able to meet RASS goal of -1 to -2. Continue Lasix drip and monitor output and electrolytes closely. Cardiology and Pulmonology following.  Discharge/Transfer Needs: TBD. Continue ICU cares at this time.     Bedside Shift Report Completed   Bedside Safety Check Completed    Goal Outcome Evaluation:      Plan of Care Reviewed With: patient    Overall Patient Progress: no changeOverall Patient Progress: no change

## 2023-07-04 NOTE — PROGRESS NOTES
[facility]  Hospitalist Progress Note  Albin Navarro MD 07/4/23    Reason for Stay (Diagnosis): Respiratory failure, COPD exacerbation, pneumothorax         Assessment and Plan:      Summary of Stay:   Lara Melendez is a 57 year old female with PMH including COPD, emphysema, as needed 2-3 L O2 at home, tobacco dependence, hypertension, anxiety, paroxysmal atrial fibrillation on Eliquis, hypothyroidism, psoriasis, and hyperlipidemia who presented on 6/21 with worsening shortness of breath and wheezing.  Symptom onset was a few days prior to presentation and consisting of wheezing so her pulmonologist prescribed azithromycin and prednisone.  She took about 3 days of these medications.  Due to continued decline she called EMS and was brought to the ED for evaluation.     In the emergency department she had a clear COPD exacerbation so was given IV steroids, IV magnesium, multiple nebulizers and placed on BiPAP.  Imaging showed no clear evidence of pneumonia but she remained on azithromycin.  She was admitted to the ICU.     The day following admission her respiratory condition deteriorated to the point that she required intubation and mechanical ventilation.  She had significant bronchospasm and high airway pressures so required deep sedation with fentanyl, ketamine and propofol with addition of vecuronium.  She was given a continuous albuterol nebulizer with not much improvement.  Pulmonology was consulted as well.  The case was even discussed with [facility] and she is not an ECMO candidate.  Repeat x-ray 6/26 showed a left-sided pneumothorax and IR placed a chest tube, thoracic surgery was consulted.  Worsening hypernatremia with tube feeds, free water increased and D5 given with improvement.  She developed a fever to 102  F and she was started on IV clindamycin 6/26.  Persistent fevers and new leukocytosis antibiotics broadened to vancomycin and cefepime.  Blood cultures negative.  Sputum culture  growing Klebsiella oxytoca resistant to IV Zosyn, no cefepime results so changed to IV meropenem given ongoing fevers.  Vancomycin discontinued with negative MRSA screen.  Urine culture growing Pseudomonas.  She was able to wean off vecuronium and ketamine for 48 hours.  Aggressive IV diuresis has continued, now on Lasix drip    Acutely worsening 6/30 with A-fib with RVR and an alternate SVT not responsive to IV diltiazem or initial IV amiodarone load.  Cardioverted due to soft blood pressure and persistent rates of 170 and she converted to sinus tachycardia but still has frequent very short-lived runs of SVT lasting a few seconds.  She was continued on IV amiodarone loading.  Vent dyssynchrony during the day 6/30 and guppy breathing she was back under deep sedation with propofol, fentanyl, Versed, and vecuronium. Phenylephrine infusion added for hypotension.  Required electrical cardioversion again on 7/2.  Received digoxin load, now discontinued.  Dysrhythmias improved more recently and IV amiodarone converted to oral amiodarone and also on oral diltiazem.  Now off vecuronium.  IV meropenem narrowed to IV cefepime based on sputum and urine culture sensitivities.  Excellent urine output with Lasix drip previously and 1 dose of metolazone, holding diuretics today.  So far tolerating weaning of sedation and we will plan on starting spontaneous breathing trials.    Problem List/Assessment and Plan:   Acute on chronic  hypoxemic and hypercapnic respiratory failure  COPD with acute exacerbation  Left-sided pneumothorax  Pneumonia secondary to Klebsiella oxytoca: At baseline she is on chronic oxygen of 2 to 3 L by nasal cannula as needed.  She has had several admissions for severe COPD requiring BiPAP, although has not previously been intubated.  Initially during this hospitalization she was on BiPAP but she quickly deteriorated so was intubated 6/21.  She developed high airway pressures and bronchospasm so required deep  sedation with fentanyl, ketamine, and propofol.  Vecuronium was added due to persistent high peak pressures. Continuous albuterol nebulizer for 24 hours provided no benefit and even seem to increase bronchospasm and has been discontinued.    Chest x-ray 6/26 showed a left-sided pneumothorax likely barotrauma in setting of emphysema and high airway pressures due to severe COPD exacerbation.  Chest tube was placed by IR.  Finished a 5-day course of azithromycin earlier.  -Intensivist to manage ventilator  -Pulmonology consulted  -We have been using a low tidal volume strategy given high ventilator pressures resulting in pneumothorax which has resulted in compensated hypercapnia which she tolerated, now increasing tidal volumes as her respiratory status is improved  -Continue IV methylprednisolone 40 mg twice daily, likely can start to taper soon  -Receiving scheduled Xopenex and Atrovent nebs.   -Weaning sedation as able to get a better assessment of her respiratory status, so far tolerating sedation wean  -Excellent diuresis with Lasix drip and 1 dose of metolazone yesterday, over 8 L out which she tolerated.  Hold on diuretics today and see how much urine she puts out over the next 12 hours  -Sputum culture 6/26 is now growing Klebsiella oxytoca that is resistant to Zosyn, so she was put on IV meropenem while awaiting sensitivities and the Klebsiella is indeed sensitive to cefepime so we have switched back to this.  Has been on appropriate antibiotics for the positive culture since 6/28  -Thoracic surgery consulted for chest tube management.  Likely keep chest tube in until extubated.  -Now on day 13 of intubation.  We will see if we can wean sedation enough to do pressure support trials with goal of extubation within the next couple of days.  If she does poorly with this we will need to discuss tracheostomy.  I previously briefly discussed this with the patient's spouse and daughter and it seemed like they would be  interested in pursuing this route if necessary as the patient has never expressed any wish to limit any medical cares  -Dr. Ovalles previously discussed the case with [facility] on she is not an ECMO candidate due to her chronic respiratory failure.    Paroxysmal atrial fibrillation  SVT: PTA on diltiazem and Eliquis.  -Cardiology consulted  -Has been receiving Eliquis during hospitalization  -Developed issues with SVT rates in the 170s and A-fib with RVR refractory to diltiazem drip.  Received IV amiodarone load and required electrical cardioversion on 7/1 and 7/2.  Also received digoxin load that has now been discontinued.  -oral amiodarone per cardiology recommendations  - oral diltiazem 90 mg every 6 hours  -Avoiding beta-blockers due to severe COPD    Hypotension:  Secondary to sedation and cardiac meds.  -phenylephrine as needed, avoiding levophed due to tachyarrhythmias     Possible CAUTI: Hines catheter was removed and urinalysis was obtained 6/28 that shows large blood, large LE, >182 WBCs and RBCs so a UTI is possible, but given the large blood it does make the pyuria difficult to interpret.  Urine culture growing  K Pseudomonas.  -Changed IV meropenem back to IV cefepime.  On IV antibiotics covering this since 6/28    Hyperkalemia, resolved: Potassium bola to 5.5 and persisted in that range for a day or 2.  She received several doses of Lokelma and potassium level has now normalized.  Unclear etiology.  Renal function is normal.  Her PTA losartan has been on hold.    Hypernatremia: Significant rise in sodium now up to 160 with tube feed initiation.  Improved with high-dose free water via NG and D5, but remained at about 150.  Sodium is now normalized after dose of metolazone and Lasix drip.  -BMP tomorrow    Steroid-induced hyperglycemia: Continue medium sliding scale aspart.  Reasonable glycemic control at this time with most blood sugars in the 150 range.    Lactic acidosis: Likely due to nebulizers  "and hypoxia.     Anxiety: Holding prior to admission clonazepam.  Continue paroxetine and as needed IV lorazepam.    HTN: Initially had some soft blood pressure, but more recently hypertensive.  -Oral diltiazem as above  -Losartan discontinued with previous hyperkalemia and hypotension    Tobacco dependence: Nicotine replacement.    Chronic anemia: Hemoglobin at baseline of 10-11.    DVT Prophylaxis: Eliquis  Code Status: Full Code  Lines: ET tube, PICC line, arterial line, PIV, Hines catheter, NG, left-sided chest tube  FEN: Tube feeds, dietitian consulted  Discharge Dispo: TBD  Estimated Disch Date / # of Days until Disch: Continue ICU level cares.  Goal to try to wean from the ventilator over the next couple of days with hopes of extubation otherwise will need tracheostomy.    Spouse Luis been updated at the bedside    Clinically Significant Risk Factors         # Hypernatremia: Highest Na = 149 mmol/L in last 2 days, will monitor as appropriate      # Hypoalbuminemia: Lowest albumin = 3.3 g/dL at 6/30/2023  1:27 PM, will monitor as appropriate   # Thrombocytopenia: Lowest platelets = 91 in last 2 days, will monitor for bleeding   # Hypertension: Noted on problem list        # Overweight: Estimated body mass index is 28.24 kg/m  as calculated from the following:    Height as of this encounter: 1.6 m (5' 3\").    Weight as of this encounter: 72.3 kg (159 lb 6.3 oz).                       Interval History (Subjective):      Over 8 L urine output yesterday after the dose of metolazone.  Lasix drip stopped today.  Tmax in the 99  F range.  Continue to wean sedation today which she is tolerating.  Having some watery stools.                  Physical Exam:      Last Vital Signs:  /61 (BP Location: Left arm)   Pulse 89   Temp 98.4  F (36.9  C)   Resp 18   Ht 1.6 m (5' 3\")   Wt 72.3 kg (159 lb 6.3 oz)   SpO2 93%   BMI 28.24 kg/m      Intake/Output Summary (Last 24 hours) at 7/4/2023 1255  Last data filed at " 7/4/2023 1200  Gross per 24 hour   Intake 3024.9 ml   Output 8505 ml   Net -5480.1 ml       Constitutional: Intubated and sedated  Eyes: sclera white   HEENT: ET tube  Respiratory: No focal crackles or wheeze  Cardiovascular: RRR with some premature beats, no murmur appreciated  GI: non-tender, not distended, bowel sounds present  Genitourinary: Hines catheter with clear yellow urine output  Skin: no rash    Musculoskeletal/extremities: 2+ diffuse pitting anasarca  Neurologic: Sedated  Psychiatric: calm          Medications:      All current medications were reviewed with changes reflected in problem list.         Data:      All new lab and imaging data were personally reviewed.   Labs:  Recent Labs   Lab 07/04/23  1154 07/04/23  0804 07/04/23  0530 07/04/23  0350 07/04/23  0107 07/03/23  1723 07/03/23  1210 07/03/23  0413 07/03/23  0200 07/02/23  1635 07/02/23  1350 07/02/23  0732 07/02/23  0538   NA  --   --  138  --   --   --  141  --  147*  --  149*  --  152*   POTASSIUM  --   --  3.9  --  4.3  --  3.8  --  3.5  --  4.6  --  3.7   CHLORIDE  --   --  80*  --   --   --  85*  --  97*  --  98  --  104   CO2  --   --  >50*  --   --   --  >50*  --  49*  --  48*  --  45*   ANIONGAP  --   --   --   --   --   --   --   --  1*  --  3*  --  3*   * 148* 138*   < >  --    < > 200*   < > 124*   < > 224*   < > 121*   BUN  --   --  33.9*  --   --   --  31.4*  --  30.5*  --  37.3*  --  37.0*   CR  --   --  0.52  --   --   --  0.43*  --  0.41*  --  0.49*  --  0.53   GFRESTIMATED  --   --  >90  --   --   --  >90  --  >90  --  >90  --  >90   EDIN  --   --  8.9  --   --   --  8.1*  --  7.4*  --  8.3*  --  8.0*    < > = values in this interval not displayed.     Recent Labs   Lab 07/04/23  0530 07/03/23  0200 07/02/23  0538   WBC 16.2* 13.3* 14.2*   HGB 10.0* 7.6* 8.0*   HCT 30.9* 25.1* 26.7*   * 111* 111*   * 91* 106*        Recent Labs   Lab 06/28/23  1040 06/28/23  1001   CULTURE No Growth  No Growth  50,000-100,000 CFU/mL Pseudomonas aeruginosa*     Last Arterial Blood Gas:  pH Arterial   Date Value Ref Range Status   07/04/2023 7.55 (H) 7.35 - 7.45 Final     pCO2 Arterial   Date Value Ref Range Status   07/04/2023 69 (H) 35 - 45 mm Hg Final     pO2 Arterial   Date Value Ref Range Status   07/04/2023 83 80 - 105 mm Hg Final     Bicarbonate Arterial   Date Value Ref Range Status   07/04/2023 60 (HH) 21 - 28 mmol/L Final     Base Excess/Deficit (+/-)   Date Value Ref Range Status   07/04/2023 32.7 (H) -9.0 - 1.8 mmol/L Final         Imaging:   Recent Results (from the past 24 hour(s))   XR Chest Port 1 View    Narrative    EXAM: XR CHEST PORT 1 VIEW  LOCATION: [facility]  DATE: 7/4/2023    INDICATION: ventilator  COMPARISON: 07/02/2023      Impression    IMPRESSION: The ET tube is unchanged in position. The right PICC and enteric tube are unchanged in position. The heart is unchanged in size and contour. There is mild central pulmonary venous congestion. Mild interstitial edema seen in the lung bases   bilaterally.     Albin Navarro MD

## 2023-07-05 LAB
ANION GAP SERPL CALCULATED.3IONS-SCNC: 4 MMOL/L (ref 7–15)
BASE EXCESS BLDV CALC-SCNC: 19.2 MMOL/L (ref -7.7–1.9)
BASE EXCESS BLDV CALC-SCNC: 24.4 MMOL/L (ref -7.7–1.9)
BUN SERPL-MCNC: 33.7 MG/DL (ref 8–23)
CALCIUM SERPL-MCNC: 8.8 MG/DL (ref 8.6–10)
CHLORIDE SERPL-SCNC: 87 MMOL/L (ref 98–107)
CREAT SERPL-MCNC: 0.49 MG/DL (ref 0.51–0.95)
DEPRECATED HCO3 PLAS-SCNC: 45 MMOL/L (ref 22–29)
ERYTHROCYTE [DISTWIDTH] IN BLOOD BY AUTOMATED COUNT: 13.7 % (ref 10–15)
GFR SERPL CREATININE-BSD FRML MDRD: >90 ML/MIN/1.73M2
GLUCOSE BLDC GLUCOMTR-MCNC: 145 MG/DL (ref 70–99)
GLUCOSE BLDC GLUCOMTR-MCNC: 158 MG/DL (ref 70–99)
GLUCOSE BLDC GLUCOMTR-MCNC: 159 MG/DL (ref 70–99)
GLUCOSE BLDC GLUCOMTR-MCNC: 173 MG/DL (ref 70–99)
GLUCOSE BLDC GLUCOMTR-MCNC: 196 MG/DL (ref 70–99)
GLUCOSE BLDC GLUCOMTR-MCNC: 241 MG/DL (ref 70–99)
GLUCOSE SERPL-MCNC: 144 MG/DL (ref 70–99)
HCO3 BLDV-SCNC: 45 MMOL/L (ref 21–28)
HCO3 BLDV-SCNC: 50 MMOL/L (ref 21–28)
HCT VFR BLD AUTO: 26.6 % (ref 35–47)
HGB BLD-MCNC: 8.7 G/DL (ref 11.7–15.7)
MAGNESIUM SERPL-MCNC: 1.6 MG/DL (ref 1.7–2.3)
MCH RBC QN AUTO: 33.2 PG (ref 26.5–33)
MCHC RBC AUTO-ENTMCNC: 32.7 G/DL (ref 31.5–36.5)
MCV RBC AUTO: 102 FL (ref 78–100)
O2/TOTAL GAS SETTING VFR VENT: 30 %
O2/TOTAL GAS SETTING VFR VENT: 45 %
OXYHGB MFR BLDV: 58 % (ref 70–75)
PCO2 BLDV: 58 MM HG (ref 40–50)
PCO2 BLDV: 62 MM HG (ref 40–50)
PH BLDV: 7.5 [PH] (ref 7.32–7.43)
PH BLDV: 7.52 [PH] (ref 7.32–7.43)
PHOSPHATE SERPL-MCNC: 1.8 MG/DL (ref 2.5–4.5)
PHOSPHATE SERPL-MCNC: 2.7 MG/DL (ref 2.5–4.5)
PLATELET # BLD AUTO: 137 10E3/UL (ref 150–450)
PO2 BLDV: 30 MM HG (ref 25–47)
PO2 BLDV: 36 MM HG (ref 25–47)
POTASSIUM SERPL-SCNC: 3.5 MMOL/L (ref 3.4–5.3)
RBC # BLD AUTO: 2.62 10E6/UL (ref 3.8–5.2)
SODIUM SERPL-SCNC: 136 MMOL/L (ref 136–145)
WBC # BLD AUTO: 11.3 10E3/UL (ref 4–11)

## 2023-07-05 PROCEDURE — 99232 SBSQ HOSP IP/OBS MODERATE 35: CPT | Performed by: INTERNAL MEDICINE

## 2023-07-05 PROCEDURE — 83735 ASSAY OF MAGNESIUM: CPT | Performed by: INTERNAL MEDICINE

## 2023-07-05 PROCEDURE — 250N000013 HC RX MED GY IP 250 OP 250 PS 637: Performed by: HOSPITALIST

## 2023-07-05 PROCEDURE — 250N000013 HC RX MED GY IP 250 OP 250 PS 637: Performed by: INTERNAL MEDICINE

## 2023-07-05 PROCEDURE — 999N000253 HC STATISTIC WEANING TRIALS

## 2023-07-05 PROCEDURE — 82803 BLOOD GASES ANY COMBINATION: CPT | Performed by: SURGERY

## 2023-07-05 PROCEDURE — 99291 CRITICAL CARE FIRST HOUR: CPT | Performed by: SURGERY

## 2023-07-05 PROCEDURE — 250N000011 HC RX IP 250 OP 636: Performed by: INTERNAL MEDICINE

## 2023-07-05 PROCEDURE — 999N000157 HC STATISTIC RCP TIME EA 10 MIN

## 2023-07-05 PROCEDURE — 258N000003 HC RX IP 258 OP 636: Performed by: SURGERY

## 2023-07-05 PROCEDURE — 84100 ASSAY OF PHOSPHORUS: CPT | Performed by: INTERNAL MEDICINE

## 2023-07-05 PROCEDURE — 94640 AIRWAY INHALATION TREATMENT: CPT | Mod: 76

## 2023-07-05 PROCEDURE — 94640 AIRWAY INHALATION TREATMENT: CPT

## 2023-07-05 PROCEDURE — 80048 BASIC METABOLIC PNL TOTAL CA: CPT | Performed by: INTERNAL MEDICINE

## 2023-07-05 PROCEDURE — 250N000011 HC RX IP 250 OP 636: Performed by: SURGERY

## 2023-07-05 PROCEDURE — 94003 VENT MGMT INPAT SUBQ DAY: CPT

## 2023-07-05 PROCEDURE — 200N000001 HC R&B ICU

## 2023-07-05 PROCEDURE — 250N000011 HC RX IP 250 OP 636: Mod: JZ | Performed by: INTERNAL MEDICINE

## 2023-07-05 PROCEDURE — 250N000009 HC RX 250: Performed by: INTERNAL MEDICINE

## 2023-07-05 PROCEDURE — 85027 COMPLETE CBC AUTOMATED: CPT | Performed by: INTERNAL MEDICINE

## 2023-07-05 PROCEDURE — 250N000011 HC RX IP 250 OP 636: Mod: JZ | Performed by: SURGERY

## 2023-07-05 PROCEDURE — 82805 BLOOD GASES W/O2 SATURATION: CPT | Performed by: STUDENT IN AN ORGANIZED HEALTH CARE EDUCATION/TRAINING PROGRAM

## 2023-07-05 PROCEDURE — 258N000003 HC RX IP 258 OP 636: Performed by: INTERNAL MEDICINE

## 2023-07-05 RX ORDER — POTASSIUM CHLORIDE 1.5 G/1.58G
20 POWDER, FOR SOLUTION ORAL ONCE
Status: COMPLETED | OUTPATIENT
Start: 2023-07-05 | End: 2023-07-05

## 2023-07-05 RX ORDER — MAGNESIUM SULFATE HEPTAHYDRATE 40 MG/ML
2 INJECTION, SOLUTION INTRAVENOUS ONCE
Status: COMPLETED | OUTPATIENT
Start: 2023-07-05 | End: 2023-07-05

## 2023-07-05 RX ORDER — MAGNESIUM OXIDE 400 MG/1
400 TABLET ORAL EVERY 4 HOURS
Status: DISCONTINUED | OUTPATIENT
Start: 2023-07-05 | End: 2023-07-05 | Stop reason: ALTCHOICE

## 2023-07-05 RX ADMIN — DILTIAZEM HYDROCHLORIDE 90 MG: 30 TABLET, FILM COATED ORAL at 06:12

## 2023-07-05 RX ADMIN — MAGNESIUM SULFATE HEPTAHYDRATE 3 G: 500 INJECTION, SOLUTION INTRAMUSCULAR; INTRAVENOUS at 08:59

## 2023-07-05 RX ADMIN — IPRATROPIUM BROMIDE 0.5 MG: 0.5 SOLUTION RESPIRATORY (INHALATION) at 15:16

## 2023-07-05 RX ADMIN — CEFEPIME 2 G: 2 INJECTION, POWDER, FOR SOLUTION INTRAVENOUS at 11:13

## 2023-07-05 RX ADMIN — CEFEPIME 2 G: 2 INJECTION, POWDER, FOR SOLUTION INTRAVENOUS at 18:28

## 2023-07-05 RX ADMIN — LEVOTHYROXINE SODIUM 112 MCG: 0.11 TABLET ORAL at 08:30

## 2023-07-05 RX ADMIN — IPRATROPIUM BROMIDE 0.5 MG: 0.5 SOLUTION RESPIRATORY (INHALATION) at 11:48

## 2023-07-05 RX ADMIN — APIXABAN 5 MG: 5 TABLET, FILM COATED ORAL at 20:15

## 2023-07-05 RX ADMIN — CEFEPIME 2 G: 2 INJECTION, POWDER, FOR SOLUTION INTRAVENOUS at 03:30

## 2023-07-05 RX ADMIN — DILTIAZEM HYDROCHLORIDE 90 MG: 30 TABLET, FILM COATED ORAL at 11:42

## 2023-07-05 RX ADMIN — POTASSIUM CHLORIDE FOR ORAL SOLUTION 20 MEQ: 1.5 POWDER, FOR SOLUTION ORAL at 06:13

## 2023-07-05 RX ADMIN — SODIUM PHOSPHATE, MONOBASIC, MONOHYDRATE AND SODIUM PHOSPHATE, DIBASIC, ANHYDROUS 15 MMOL: 142; 276 INJECTION, SOLUTION INTRAVENOUS at 06:13

## 2023-07-05 RX ADMIN — Medication 40 MG: at 08:33

## 2023-07-05 RX ADMIN — PROPOFOL 20 MCG/KG/MIN: 10 INJECTION, EMULSION INTRAVENOUS at 18:28

## 2023-07-05 RX ADMIN — NICOTINE 1 PATCH: 14 PATCH, EXTENDED RELEASE TRANSDERMAL at 08:30

## 2023-07-05 RX ADMIN — LEVALBUTEROL HYDROCHLORIDE 0.63 MG: 0.63 SOLUTION RESPIRATORY (INHALATION) at 07:25

## 2023-07-05 RX ADMIN — AMIODARONE HYDROCHLORIDE 400 MG: 200 TABLET ORAL at 20:15

## 2023-07-05 RX ADMIN — LEVALBUTEROL HYDROCHLORIDE 0.63 MG: 0.63 SOLUTION RESPIRATORY (INHALATION) at 15:15

## 2023-07-05 RX ADMIN — Medication 15 ML: at 08:29

## 2023-07-05 RX ADMIN — DILTIAZEM HYDROCHLORIDE 90 MG: 30 TABLET, FILM COATED ORAL at 17:48

## 2023-07-05 RX ADMIN — PROPOFOL 25 MCG/KG/MIN: 10 INJECTION, EMULSION INTRAVENOUS at 11:37

## 2023-07-05 RX ADMIN — PROPOFOL 20 MCG/KG/MIN: 10 INJECTION, EMULSION INTRAVENOUS at 02:09

## 2023-07-05 RX ADMIN — IPRATROPIUM BROMIDE 0.5 MG: 0.5 SOLUTION RESPIRATORY (INHALATION) at 07:25

## 2023-07-05 RX ADMIN — AMIODARONE HYDROCHLORIDE 400 MG: 200 TABLET ORAL at 08:29

## 2023-07-05 RX ADMIN — APIXABAN 5 MG: 5 TABLET, FILM COATED ORAL at 08:30

## 2023-07-05 RX ADMIN — ATORVASTATIN CALCIUM 20 MG: 20 TABLET, FILM COATED ORAL at 08:30

## 2023-07-05 RX ADMIN — LEVALBUTEROL HYDROCHLORIDE 0.63 MG: 0.63 SOLUTION RESPIRATORY (INHALATION) at 11:48

## 2023-07-05 RX ADMIN — DILTIAZEM HYDROCHLORIDE 90 MG: 30 TABLET, FILM COATED ORAL at 00:21

## 2023-07-05 RX ADMIN — MAGNESIUM SULFATE HEPTAHYDRATE 2 G: 2 INJECTION, SOLUTION INTRAVENOUS at 06:11

## 2023-07-05 RX ADMIN — DILTIAZEM HYDROCHLORIDE 90 MG: 30 TABLET, FILM COATED ORAL at 23:51

## 2023-07-05 RX ADMIN — METHYLPREDNISOLONE SODIUM SUCCINATE 40 MG: 40 INJECTION, POWDER, FOR SOLUTION INTRAMUSCULAR; INTRAVENOUS at 08:33

## 2023-07-05 RX ADMIN — LEVALBUTEROL HYDROCHLORIDE 0.63 MG: 0.63 SOLUTION RESPIRATORY (INHALATION) at 20:03

## 2023-07-05 RX ADMIN — PAROXETINE 20 MG: 10 SUSPENSION ORAL at 08:33

## 2023-07-05 RX ADMIN — IPRATROPIUM BROMIDE 0.5 MG: 0.5 SOLUTION RESPIRATORY (INHALATION) at 20:03

## 2023-07-05 ASSESSMENT — ACTIVITIES OF DAILY LIVING (ADL)
ADLS_ACUITY_SCORE: 32
ADLS_ACUITY_SCORE: 32
ADLS_ACUITY_SCORE: 36
ADLS_ACUITY_SCORE: 32

## 2023-07-05 NOTE — PLAN OF CARE
ICU End of Shift Summary.  For vital signs and complete assessments, please see documentation flowsheets.      Pertinent assessments: Pt much more alert tonight with opening eyes spontaneously but not following commands at all. Not moving any extremity. Appears to be tracking at times. RASS score -1, CPOT 0. T max 99.5 degrees F. LSs coarse/diminished throughout tonight. Not getting much when suctioning from ET tube - remains cloudy/thin. Pale yellow sputum suctioned from mouth/sub glottal area. Tele SR with frequent PACs and 3-4 PSVT runs into the 120s tonight - self-limiting. Hines with 2, 480 mL out this shift. Weight down 4 kg from yesterday.   Major Shift Events: Pt not in sync with the ventilator at times. Propofol increased slightly with little change. Wean started this morning per RT with patient tolerating. Able to wean FiO2 from 40% to 30%.  Plan (Upcoming Events): Continue vent support and wean as able. Assess need for trach in the next couple of days. Continue antibiotics and steroids/nebs. Continue PO Dilt and Amio per Cardiology.   Discharge/Transfer Needs: TBD. Continue ICU cares at this time.     Bedside Shift Report Completed   Bedside Safety Check Completed    Goal Outcome Evaluation:      Plan of Care Reviewed With: patient    Overall Patient Progress: no changeOverall Patient Progress: no change

## 2023-07-05 NOTE — PROGRESS NOTES
[facility] ICU VENTILATOR RESPIRATORY NOTE    Date of Admission: 6/20/23    Date of Intubation (most recent): 6/21/23    Reason for Mechanical Ventilation: Respiratory failure    Number of Days on Mechanical Ventilation: 14    Met Criteria for Pressure Support Trial: yes, ongoing. Settings 8/5, 30-45%    ABG Results:   Recent Labs   Lab 07/05/23  1319 07/05/23  0507 07/04/23  1547 07/04/23  0738 07/03/23  0950 07/02/23  0751 07/01/23  0808   PH  --   --   --  7.55* 7.45 7.42 7.27*   PCO2  --   --   --  69* 84* 81* 103*   PO2  --   --   --  83 54* 71* 197*   HCO3  --   --   --  60* 59* 52* 47*   O2PER 45 30 40 40 40 50 70     ETT appearance on chest x-ray: 5.4 cm above stevie    Plan:  Patient remains on ventilator with settings of:  Vent Mode: CPAP/PS  (Continuous positive airway pressure with Pressure Support)  FiO2 (%): 30 %  Resp Rate (Set): 14 breaths/min  Tidal Volume (Set, mL): 330 mL  PEEP (cm H2O): 5 cmH2O  Pressure Support (cm H2O): 8 cmH2O  Resp: (!) 8

## 2023-07-05 NOTE — PROGRESS NOTES
[facility] ICU VENTILATOR RESPIRATORY NOTE  Date of Admission: 06/20/23  Date of Intubation (most recent): 06/21/23  Reason for Mechanical Ventilation: Respiratory failure  Number of Days on Mechanical Ventilation: 15  Met Criteria for Pressure Support Trial: Yes  Length of Pressure Support Trial: about an 1 hr on PS 8/+5 @ 30%  Reason for Stopping Pressure Support Trial: End of designated trial time   Significant Events Today: set rate reduced from 18 to 14 post VBG. Pt tolerated weaning for about an hour; Spont RR 16, Spont  mL, Spont VE 7.44. RSBI: 29.47   VBG Results:   Venous Blood Gas  Recent Labs   Lab 07/05/23  0507 07/04/23  1547 07/04/23  0738 07/03/23  0950   PHV 7.52* 7.46*  --   --    PCO2V 62* 75*  --   --    PO2V 30 45  --   --    HCO3V 50* 54*  --   --    SUSANNAH 24.4* 26.3*  --   --    O2PER 30 40 40 40       Plan:  Continue to monitor and assess pt's respiratory status    Vent Mode: (S) CMV/AC  (Continuous Mandatory Ventilation/ Assist Control)  FiO2 (%): 30 %  Resp Rate (Set): (S) 14 breaths/min  Tidal Volume (Set, mL): 330 mL  PEEP (cm H2O): 5 cmH2O  Pressure Support (cm H2O): 8 cmH2O  Resp: 25      Ulices Escalante, RT

## 2023-07-05 NOTE — PLAN OF CARE
Goal Outcome Evaluation:      Plan of Care Reviewed With: family    Overall Patient Progress: improvingOverall Patient Progress: improving            ICU End of Shift Summary.  For vital signs and complete assessments, please see documentation flowsheets.      Pertinent assessments:   Neuro: RASS 0. More awake nodding to yes or no question.   Cardiac: SR with PACs,   Resp: Tolerating weaning 8/5. Fio2:30%, LS: coarse crackles.    GI: TF: 65 ml with flushing of 30 mls Q4  : With el catheter draining large amount of urine  Skin: With skin tear at left upper extremity with mepilex  Lines: PICC 3 lumen at right basilic vein  Drips: Fentanyl 50 mcg/hr and propofol at 20 mg/hr    Major Shift Events:   Propofol gtt decreased to 20mg/hr, Fio2 increased to 45 % due to desaturation up to 88%,   Vent wean most of shift Sat more than 90's  Plan (Upcoming Events): Wean vent as tolerated. Continue plan of care.  Discharge/Transfer Needs: TBD     Bedside Shift Report Completed : Y  Bedside Safety Check Completed: Y

## 2023-07-05 NOTE — PROGRESS NOTES
Critical Care  Note      07/05/2023    Name: Lara Melendez MRN#: [id number]   Age: 57 year old YOB: 1965     Hsptl Day# 14  ICU DAY # 14    MV DAY # 14             Problem List:   Principal Problem:    Paroxysmal atrial fibrillation with RVR (H)  Active Problems:    COPD exacerbation (H)    Anticoagulated    Acute and chronic respiratory failure with hypercapnia (H)           Summary/Hospital Course:   57-year-old female with history of COPD, emphysema, O2 dependent at home, tobacco dependence, hypertension, anxiety, atrial fibrillation on Eliquis, hypothyroidism presents with worsening shortness of breath and wheezing.  She appears to be in status asthmaticus versus worsening COPD exacerbation.  Patient was prescribed azithromycin and prednisone prior to admission.  She continued decline was brought to EMS for evaluation.  Where she was given IV steroids, IV magnesium nebulizers and BiPAP.  After admission to the ICU she failed a BiPAP trial and required intubation mechanical ventilation.  She had significant bronchospasm with high airway pressures requiring deep sedation with fentanyl ketamine and propofol and addition of a paralytic.  Events of last 24 hours noted for episodes of RVR with associated atrial fibrillation and SVT.  Patient was cardioverted back into sinus rhythm with frequent PACs. .  She was weaned off vecuronium.  She did have an episode of A-fib with RVR with relative soft pressures for which she underwent a cardioversion which converted her to atrial fib with rate control.  We are weaning her propofol actively and phenylephrine also.    Assessment and plan :     Lara Melendez IS a 57 year old female admitted on 6/20/2023 for, acute respiratory failure secondary to COPD exacerbation, small left-sided pneumothorax and history of atrial fibrillation on Eliquis..   I have personally reviewed the daily labs, imaging studies, cultures and discussed the case with referring physician  and consulting physicians.     My assessment and plan by system for this patient is as follows:    Neurology/Psychiatry:   1. Sedation  2  Pain Mgt  3. ICU Delirium     Plan    discontinue Versed drip yesterday afternoon  Propofol; continue to wean with SAT today.  On fentanyl drip: continue to wean down with SAT today  RASS goal 0-1    Cardiovascular:   1.Hemodynamics -normotensive to hypertensive  2.Rhythm sinus with PACs  3. Ischemia -no signs of ischemia  4. A-fib with episodes of RVR: required cardioversion this admission.  Plan  Rate controlled  Appreciate cardiology input  Continue p.o. amiodarone.   Continue po cardizem  Mag >2  K+ >4  On Apixiban for anticoagulation    Pulmonary/Ventilator Management:   1. Airway intubated for acute hypoxic and hypercapnic respiratory failure  2. Oxygenation/ventilation/mechanics on full mechanical ventilatory support  Plan  -Overall her respiratory status seems to be slowly improving.  We were able to increase tidal volumel Her airway pressures have not significantly increased and her compensated respiratory acidosis has improved.      Vent Mode: CPAP/PS  (Continuous positive airway pressure with Pressure Support)  FiO2 (%): 45 %  Resp Rate (Set): 14 breaths/min  Tidal Volume (Set, mL): 330 mL  PEEP (cm H2O): 5 cmH2O  Pressure Support (cm H2O): 8 cmH2O  Resp: 11    Continue her on scheduled nebs  Steroids complete today   Continue chest tube until patient is extubated.   If patient does not make any further progress would need tracheostomy later this week.  Currently doing well on PS 8/5.  Will maintain on this setting during the day if tolerated    GI and Nutrition :   1.  Continue tube feeds at goal   - Patient does not meet criteria for malnutrition at this time    Plan  -continue enteral feeds.    Renal/Fluids/Electrolytes:   1.  No acute renal injury at this time  2.  Hypernatremia: resolved  3.  UnCompensated metabolic alkalosis: suspect component of contraction  alkalosis from auto diuresis  4.  Appears euvolemic  Plan  - monitor function and electrolytes as needed with replacement per ICU protocols. - generally avoid nephrotoxic agents such as NSAID, IV  contrast unless specifically required  - adjust medications as needed for renal clearance  - follow I/O's as appropriate.      Infectious Disease:   1.  Sepsis   UTI: Pseudomonas aeruginosa   positive sputum culture: Klebsiella oxytoca    Plan  -Continue patient on cefepime; plan to stop abx tomorrow for a 10 day course      Endocrine:   1.  Concern for stress-induced hyperglycemia  2.  Concern for diabetes induced hyperglycemia  3.  Plan  - ICU insulin protocol, goal sugar <180      Hematology/Oncology:   1.  Leukocytosis: improving  2.  Anemia: multifactorial  3.  Medication induced coagulopathy: On Eliquis for atrial fibrillation  Plan  -Continue to monitor     IV/Access:   1. Venous access -central access  2. Arterial access - n/a  Plan  - central access required and necessary      ICU Prophylaxis:   1. DVT: On Eliquis  2. VAP: HOB 30 degrees, chlorhexidine rinse  3. Stress Ulcer: PPI/H2 blocker  4. Restraints: Nonviolent soft two point restraints required and necessary for patient safety and continued cares and good effect as patient continues to pull at necessary lines, tubes despite education and distraction. Will readdress daily.   5. Wound care  -local wound care  6. Feeding -continue tube feeds  7. Family Update:   8. Disposition -ICU        Key goals for next 24 hours:   1.  Continue on vent with SBT  2.  Wean sedation as tolerated with SAT  3.  Hold diuresis today               Medical History:     Past Medical History:   Diagnosis Date     Anxiety      COPD (chronic obstructive pulmonary disease) - 2L home O2      Diverticulitis of colon      Hypercholesterolemia      Hypertension      Hypothyroidism      Paroxysmal atrial fibrillation      Psoriasis      Pulmonary nodule - left upper lobe      Past Surgical  History:   Procedure Laterality Date     CHOLECYSTECTOMY       INCISION AND DRAINAGE MANDIBLE, COMBINED Left 01/10/2022    Procedure: INCISION AND DRAINAGE, MANDIBLE;  Surgeon: Jn Feliz DDS;  Location: [facility] OR     INCISION AND DRAINAGE MANDIBLE, COMBINED Left 02/04/2022    Procedure: INCISION AND DRAINAGE, MANDIBLE;  Surgeon: Taylor Ortez DDS;  Location: [facility] OR     TOOTH EXTRACTION       Vocal Cord surgery       Social History     Socioeconomic History     Marital status:      Spouse name: Not on file     Number of children: Not on file     Years of education: Not on file     Highest education level: Not on file   Occupational History     Not on file   Tobacco Use     Smoking status: Never     Smokeless tobacco: Never   Substance and Sexual Activity     Alcohol use: Yes     Comment: occ     Drug use: Never     Sexual activity: Not Currently   Other Topics Concern     Not on file   Social History Narrative     Not on file     Social Determinants of Health     Financial Resource Strain: Not on file   Food Insecurity: Not on file   Transportation Needs: Not on file   Physical Activity: Not on file   Stress: Not on file   Social Connections: Not on file   Intimate Partner Violence: Not on file   Housing Stability: Not on file        Allergies   Allergen Reactions     Sulfa Antibiotics      Doxycycline Other (See Comments)     Develops chest tightness  Intolerance not allergy     Penicillins Rash     Generalized rash  Rash, Generalized                Key Medications:       [START ON 7/12/2023] amiodarone  200 mg Oral Daily     amiodarone  400 mg Oral BID     apixaban ANTICOAGULANT  5 mg Per Feeding Tube BID     atorvastatin  20 mg Per Feeding Tube Daily     ceFEPIme  2 g Intravenous Q8H     diltiazem  90 mg Oral or Feeding Tube Q6H [facility]     insulin aspart  1-12 Units Subcutaneous Q4H     ipratropium  0.5 mg Nebulization 4x daily     levalbuterol  0.63 mg Nebulization 4x Daily     levothyroxine  112 mcg Per  Feeding Tube Daily     multivitamins w/minerals  15 mL Per Feeding Tube Daily     nicotine  1 patch Transdermal Daily     nicotine   Transdermal Q8H     pantoprazole  40 mg Per Feeding Tube Daily     PARoxetine  20 mg Per Feeding Tube Daily     protein modular  1 packet Per Feeding Tube Daily     sodium chloride (PF)  10-40 mL Intracatheter Q8H     sodium chloride (PF)  3 mL Intracatheter Q8H       fentaNYL 50 mcg/hr (07/05/23 1200)     lactated ringers Stopped (06/28/23 0011)     - MEDICATION INSTRUCTIONS -       phenylephrine Stopped (07/02/23 1147)     propofol 25 mcg/kg/min (07/05/23 1200)        Home Meds  No current facility-administered medications on file prior to encounter.  albuterol (PROAIR HFA/PROVENTIL HFA/VENTOLIN HFA) 108 (90 Base) MCG/ACT inhaler, Inhale 2 puffs into the lungs every 4 hours as needed for shortness of breath / dyspnea or wheezing Inhale 1-2 Puffs every 4 hours as needed for Wheezing.  albuterol (PROVENTIL) (2.5 MG/3ML) 0.083% neb solution, Take 1 vial (2.5 mg) by nebulization every 4 hours as needed for shortness of breath or wheezing  apixaban ANTICOAGULANT (ELIQUIS) 5 MG tablet, Take 1 tablet (5 mg) by mouth 2 times daily  atorvastatin (LIPITOR) 20 MG tablet, Take 20 mg by mouth daily  clonazePAM (KLONOPIN) 0.5 MG tablet, Take 0.5 mg by mouth daily  cyclobenzaprine (FLEXERIL) 5 MG tablet, Take 1 tablet (5 mg) by mouth every 8 hours as needed for muscle spasms  diclofenac (VOLTAREN) 1 % topical gel, Apply 2 g topically 4 times daily  diltiazem ER COATED BEADS (CARDIZEM CD/CARTIA XT) 240 MG 24 hr capsule, Take 1 capsule (240 mg) by mouth daily  fluticasone-salmeterol (ADVAIR-HFA) 230-21 MCG/ACT inhaler, Inhale 2 puffs into the lungs 2 times daily  ipratropium - albuterol 0.5 mg/2.5 mg/3 mL (DUONEB) 0.5-2.5 (3) MG/3ML neb solution, Take 1 vial (3 mLs) by nebulization every 6 hours as needed for shortness of breath / dyspnea or wheezing  levothyroxine (SYNTHROID/LEVOTHROID) 112 MCG  tablet, Take 112 mcg by mouth daily  losartan (COZAAR) 25 MG tablet, Take 1 tablet (25 mg) by mouth daily  nicotine (NICODERM CQ) 21 MG/24HR 24 hr patch, Place 1 patch onto the skin daily  PARoxetine (PAXIL) 20 MG tablet, Take 20 mg by mouth daily  predniSONE (DELTASONE) 20 MG tablet, Take 60 mg for 2 days, then 40 mg for 3 days, then 20 mg for 3 days, then 10 mg for 3 days, then stop  tiotropium (SPIRIVA RESPIMAT) 2.5 MCG/ACT inhaler, Inhale 2 puffs into the lungs daily as needed               Physical Examination:   Temp:  [97.7  F (36.5  C)-99.7  F (37.6  C)] 98.8  F (37.1  C)  Pulse:  [] 79  Resp:  [11-25] 11  BP: (103-140)/(52-71) 113/60  FiO2 (%):  [30 %-45 %] 45 %  SpO2:  [87 %-100 %] 100 %    Intake/Output Summary (Last 24 hours) at 6/26/2023 1215  Last data filed at 6/26/2023 1200  Gross per 24 hour   Intake 2195.19 ml   Output 4555 ml   Net -2359.81 ml     Wt Readings from Last 4 Encounters:   07/05/23 68.3 kg (150 lb 9.2 oz)   04/01/23 68.8 kg (151 lb 9.6 oz)   02/11/23 60 kg (132 lb 4.4 oz)   12/02/22 61.3 kg (135 lb 2.3 oz)     BP - Mean:  [] 77  Vent Mode: CPAP/PS  (Continuous positive airway pressure with Pressure Support)  FiO2 (%): 45 %  Resp Rate (Set): 14 breaths/min  Tidal Volume (Set, mL): 330 mL  PEEP (cm H2O): 5 cmH2O  Pressure Support (cm H2O): 8 cmH2O  Resp: 11    Recent Labs   Lab 07/05/23  0507 07/04/23  1547 07/04/23  0738 07/03/23  0950 07/02/23  0751 07/01/23  0808   PH  --   --  7.55* 7.45 7.42 7.27*   PCO2  --   --  69* 84* 81* 103*   PO2  --   --  83 54* 71* 197*   HCO3  --   --  60* 59* 52* 47*   O2PER 30 40 40 40 50 70       GEN: no acute distress consistent with chemical sedation  HEENT: head ncat, sclera anicteric, OP patent, trachea midline   PULM: unlabored synchronous with vent, diffuse wheezes anteriorly but improving overall however wheezes increased this morning, chest tube in good position show signs of titling however no air leak  CV/COR: Irregular rate and  rhythm S1S2 no gallop,  No rub, no murmur  ABD: soft nontender, hypoactive bowel sounds, no mass  EXT:  Edema   warm  NEURO: Does not follow commands, consistent with chemical sedation  SKIN: no obvious rash  LINES: clean, dry intact         Data:   All data and imaging reviewed     ROUTINE ICU LABS (Last four results)  CMP  Recent Labs   Lab 07/05/23  1219 07/05/23  0814 07/05/23  0507 07/05/23  0343 07/04/23  0804 07/04/23  0530 07/04/23  0350 07/04/23  0107 07/03/23  1955 07/03/23  1951 07/03/23  1723 07/03/23  1210 07/03/23  0413 07/03/23  0200 07/02/23  1635 07/02/23  1350 07/02/23  0732 07/02/23  0538 06/30/23  1612 06/30/23  1327   NA  --   --  136  --   --  138  --   --   --   --   --  141  --  147*  --  149*  --  152*   < > 151*   POTASSIUM  --   --  3.5  --   --  3.9  --  4.3  --   --   --  3.8  --  3.5  --  4.6  --  3.7   < > 4.6   CHLORIDE  --   --  87*  --   --  80*  --   --   --   --   --  85*  --  97*  --  98  --  104   < > 102   CO2  --   --  45*  --   --  >50*  --   --   --   --   --  >50*  --  49*  --  48*  --  45*   < > 44*   ANIONGAP  --   --  4*  --   --   --   --   --   --   --   --   --   --  1*  --  3*  --  3*   < > 5*   * 158* 144* 145*   < > 138*   < >  --    < >  --    < > 200*   < > 124*   < > 224*   < > 121*   < > 203*   BUN  --   --  33.7*  --   --  33.9*  --   --   --   --   --  31.4*  --  30.5*  --  37.3*  --  37.0*   < > 38.7*   CR  --   --  0.49*  --   --  0.52  --   --   --   --   --  0.43*  --  0.41*  --  0.49*  --  0.53   < > 0.52   GFRESTIMATED  --   --  >90  --   --  >90  --   --   --   --   --  >90  --  >90  --  >90  --  >90   < > >90   EDIN  --   --  8.8  --   --  8.9  --   --   --   --   --  8.1*  --  7.4*  --  8.3*  --  8.0*   < > 8.6   MAG  --   --  1.6*  --   --  2.1  --   --   --   --   --  2.1  --  1.8  --  1.9  --  2.2   < > 2.4*   PHOS  --   --  1.8*  --   --  2.7  --   --   --  2.9  --   --   --  2.2*   < >  --   --  1.6*   < >  --    PROTTOTAL  --   --   --    --   --   --   --   --   --   --   --   --   --   --   --   --   --   --   --  5.7*   ALBUMIN  --   --   --   --   --   --   --   --   --   --   --   --   --   --   --   --   --   --   --  3.3*   BILITOTAL  --   --   --   --   --   --   --   --   --   --   --   --   --   --   --   --   --   --   --  0.3   ALKPHOS  --   --   --   --   --   --   --   --   --   --   --   --   --   --   --   --   --   --   --  42   AST  --   --   --   --   --   --   --   --   --   --   --   --   --   --   --   --   --   --   --  43   ALT  --   --   --   --   --   --   --   --   --   --   --   --   --   --   --   --   --   --   --  53*    < > = values in this interval not displayed.     CBC  Recent Labs   Lab 07/05/23  0507 07/04/23  0530 07/03/23  0200 07/02/23  0538   WBC 11.3* 16.2* 13.3* 14.2*   RBC 2.62* 3.00* 2.27* 2.40*   HGB 8.7* 10.0* 7.6* 8.0*   HCT 26.6* 30.9* 25.1* 26.7*   * 103* 111* 111*   MCH 33.2* 33.3* 33.5* 33.3*   MCHC 32.7 32.4 30.3* 30.0*   RDW 13.7 13.8 14.0 13.7   * 140* 91* 106*     INR  No lab results found in last 7 days.  Arterial Blood Gas  Recent Labs   Lab 07/05/23  0507 07/04/23  1547 07/04/23  0738 07/03/23  0950 07/02/23  0751 07/01/23  0808   PH  --   --  7.55* 7.45 7.42 7.27*   PCO2  --   --  69* 84* 81* 103*   PO2  --   --  83 54* 71* 197*   HCO3  --   --  60* 59* 52* 47*   O2PER 30 40 40 40 50 70       All cultures:  No results for input(s): CULT in the last 168 hours.  Recent Results (from the past 24 hour(s))   XR Chest Port 1 View    Narrative    CHEST ONE VIEW  6/26/2023 9:29 AM     HISTORY: Increasing oxygen requirements and airway pressure.    COMPARISON: June 23, 2023      Impression    IMPRESSION: Endotracheal tube tip 4.3 cm from the stevie. Right PICC  line stable. Minimal pleural fluid or pleural thickening bilaterally  similar to previous. Small-to-moderate pneumothorax on the left new  from previous.    Results called to the patient's nurse on June 26, 2023 at 9:44 AM.      GHAZALA ORELLANA MD         SYSTEM ID:  [id number]         Billing: This patient is critically ill: yes. Total critical care time today 50 min.            Ghazala Mackey,   Surgical Critical Care Medicine

## 2023-07-05 NOTE — PROGRESS NOTES
Notified provider about indwelling el catheter discussed removal or continued need.    Did provider choose to remove indwelling el catheter? NO    Provider's el indication for keeping indwelling el catheter: Indication for continued use: Strict 1-2 Hour I & O if external catheters are not an option    Is there an order for indwelling el catheter? YES    *If there is a plan to keep el catheter in place at discharge daily notification with provider is not necessary, but please add a notation in the treatment team sticky note that the patient will be discharging with the catheter.

## 2023-07-05 NOTE — PROGRESS NOTES
Inpatient Cardiology Consultation Progress Note:    Lara Melendez MRN#: [id number]   YOB: 1965 Age: 57 year old     Date of Admission: 6/20/2023  Consult indication: atrial fibrillation          Assessment and Plan:     # Paroxysmal/persistent atrial fibrillation in setting of acute on chronic hypoxic hypercarbic respiratory failure requiring intubation and mechanical ventilation, underlying severe COPD on supple O2, now with pneumothorax s/p chest tube placement, possible pneumonia. Required DCCV x2 with subsequent intermittent atrial fibrillation with RVR, now back in sinus rhythm    TTE 3/2023 LVEF >70%, normal RV function, no significant valvular abnormalities    - continue diltiazem 90mg q6h with hold parameters  - continue amiodarone 400mg BID for 9 days (total) to finish the load then 200mg daily  - continue apixaban  - furosemide was discontinued, BUN rising, will need close monitoring of volume status  - Cardiac telemetry.  Monitor electrolytes daily, maintain potassium greater than 4, magnesium greater than 2  - Cardiology will follow     Thank you for allowing our team to participate in the care of Lara Melendez.  Please do not hesitate to page me with any questions or concerns.     Alexis Hough MD, [facility]  Cardiology  July 5, 2023    Voice recognition software utilized.     Moderate complexity          Interval Events:     - no acute events overnight  - interval labs and studies reviewed   - interval progress notes reviewed         Medications reviewed:     Current medications:  Current Facility-Administered Medications Ordered in Epic   Medication Dose Route Frequency Last Rate Last Admin     acetaminophen (TYLENOL) solution 650 mg  650 mg Per Feeding Tube Q4H PRN   650 mg at 07/02/23 1349    Or     acetaminophen (TYLENOL) Suppository 650 mg  650 mg Rectal Q4H PRN         [START ON 7/12/2023] amiodarone (PACERONE) tablet 200 mg  200 mg Oral Daily         amiodarone  (PACERONE) tablet 400 mg  400 mg Oral BID   400 mg at 07/05/23 0829     apixaban ANTICOAGULANT (ELIQUIS) tablet 5 mg  5 mg Per Feeding Tube BID   5 mg at 07/05/23 0830     atorvastatin (LIPITOR) tablet 20 mg  20 mg Per Feeding Tube Daily   20 mg at 07/05/23 0830     ceFEPIme (MAXIPIME) 2 g vial to attach to  ml bag for ADULTS or 50 ml bag for PEDS  2 g Intravenous Q8H   2 g at 07/05/23 1113     cyclobenzaprine (FLEXERIL) tablet 5 mg  5 mg Per Feeding Tube Q8H PRN         glucose gel 15-30 g  15-30 g Oral Q15 Min PRN        Or     dextrose 50 % injection 25-50 mL  25-50 mL Intravenous Q15 Min PRN        Or     glucagon injection 1 mg  1 mg Subcutaneous Q15 Min PRN         diltiazem (CARDIZEM) tablet 90 mg  90 mg Oral or Feeding Tube Q6H ELZBIETA   90 mg at 07/05/23 1142     sennosides (SENOKOT) syrup 5-10 mL  5-10 mL Per Feeding Tube BID PRN   10 mL at 06/28/23 0554    And     docusate (COLACE) 50 MG/5ML liquid  mg   mg Per Feeding Tube BID PRN   100 mg at 06/26/23 0820     fentaNYL (SUBLIMAZE) 50 mcg/mL bolus from pump  50 mcg Intravenous Q1H PRN   50 mcg at 06/29/23 2222     fentaNYL (SUBLIMAZE) infusion   mcg/hr Intravenous Continuous 1 mL/hr at 07/05/23 1600 50 mcg/hr at 07/05/23 1600     flumazenil (ROMAZICON) injection 0.2 mg  0.2 mg Intravenous q1 min prn         hydrALAZINE (APRESOLINE) injection 10 mg  10 mg Intravenous Q6H PRN   10 mg at 06/27/23 0959     insulin aspart (NovoLOG) injection (RAPID ACTING)  1-12 Units Subcutaneous Q4H   5 Units at 07/05/23 1617     ipratropium (ATROVENT) 0.02 % neb solution 0.5 mg  0.5 mg Nebulization 4x daily   0.5 mg at 07/05/23 1516     lactated ringers infusion   Intravenous Continuous   Stopped at 06/28/23 0011     levalbuterol (XOPENEX) neb solution 0.63 mg  0.63 mg Nebulization 4x Daily   0.63 mg at 07/05/23 1515     levalbuterol (XOPENEX) neb solution 0.63 mg  0.63 mg Nebulization Q2H PRN   0.63 mg at 06/22/23 4426     levothyroxine  (SYNTHROID/LEVOTHROID) tablet 112 mcg  112 mcg Per Feeding Tube Daily   112 mcg at 07/05/23 0830     midazolam (VERSED) injection 2 mg  2 mg Intravenous Q1H PRN   2 mg at 07/02/23 0942     multivitamins w/minerals liquid 15 mL  15 mL Per Feeding Tube Daily   15 mL at 07/05/23 0829     naloxone (NARCAN) injection 0.2 mg  0.2 mg Intravenous Q2 Min PRN        Or     naloxone (NARCAN) injection 0.4 mg  0.4 mg Intravenous Q2 Min PRN        Or     naloxone (NARCAN) injection 0.2 mg  0.2 mg Intramuscular Q2 Min PRN        Or     naloxone (NARCAN) injection 0.4 mg  0.4 mg Intramuscular Q2 Min PRN         nicotine (NICODERM CQ) 14 MG/24HR 24 hr patch 1 patch  1 patch Transdermal Daily   1 patch at 07/05/23 0830     nicotine Patch in Place   Transdermal Q8H         ondansetron (ZOFRAN ODT) ODT tab 4 mg  4 mg Oral Q6H PRN        Or     ondansetron (ZOFRAN) injection 4 mg  4 mg Intravenous Q6H PRN         pantoprazole (PROTONIX) 2 mg/mL suspension 40 mg  40 mg Per Feeding Tube Daily   40 mg at 07/05/23 0833     PARoxetine (PAXIL) suspension 20 mg  20 mg Per Feeding Tube Daily   20 mg at 07/05/23 0833     Patient is already receiving anticoagulation with heparin, enoxaparin (LOVENOX), warfarin (COUMADIN)  or other anticoagulant medication   Does not apply Continuous PRN         phenylephrine (TONY-SYNEPHRINE) 50 mg in NaCl 0.9 % 250 mL infusion  0.1-6 mcg/kg/min Intravenous Continuous   Stopped at 07/02/23 1147     polyethylene glycol (MIRALAX) Packet 17 g  17 g Oral Daily PRN   17 g at 06/27/23 0405     prochlorperazine (COMPAZINE) injection 10 mg  10 mg Intravenous Q6H PRN        Or     prochlorperazine (COMPAZINE) tablet 10 mg  10 mg Oral Q6H PRN        Or     prochlorperazine (COMPAZINE) suppository 25 mg  25 mg Rectal Q12H PRN         propofol (DIPRIVAN) infusion  5-75 mcg/kg/min Intravenous Continuous 9.5 mL/hr at 07/05/23 1600 20 mcg/kg/min at 07/05/23 1600     protein modular (PROSOURCE TF20) packet 1 packet  1 packet Per  "Feeding Tube Daily   1 packet at 07/05/23 0831     sodium chloride (PF) 0.9% PF flush 10-20 mL  10-20 mL Intracatheter q1 min prn         sodium chloride (PF) 0.9% PF flush 10-40 mL  10-40 mL Intracatheter Once PRN         sodium chloride (PF) 0.9% PF flush 10-40 mL  10-40 mL Intracatheter Q8H   10 mL at 07/05/23 1609     sodium chloride (PF) 0.9% PF flush 10-40 mL  10-40 mL Intracatheter Q1H PRN         sodium chloride (PF) 0.9% PF flush 3 mL  3 mL Intracatheter Q8H   3 mL at 07/05/23 0022     sodium chloride (PF) 0.9% PF flush 3 mL  3 mL Intracatheter q1 min prn         No current [facility]-ordered outpatient medications on file.             Physical Exam:   Vital signs:  Temp: 98.2  F (36.8  C) Temp src: Bladder BP: 111/63 Pulse: 76   Resp: (!) 8 SpO2: 96 % O2 Device: Mechanical Ventilator   Height: 160 cm (5' 3\") Weight: 68.3 kg (150 lb 9.2 oz)  Estimated body mass index is 26.67 kg/m  as calculated from the following:    Height as of this encounter: 1.6 m (5' 3\").    Weight as of this encounter: 68.3 kg (150 lb 9.2 oz).        Intake/Output Summary (Last 24 hours) at 7/5/2023 1706  Last data filed at 7/5/2023 1600  Gross per 24 hour   Intake 2608.8 ml   Output 5490 ml   Net -2881.2 ml       150 lbs 9.19 oz  Body mass index is 26.67 kg/m .   Body surface area is 1.74 meters squared.    General/Constitutional: appears stated age, intubated and sedated, opens eyes but not following commands  Head: normocephalic, atraumatic  Respiratory: diminished breath sounds bilaterally, mechanically ventilated  Cardiovascular: regular rate, regular rhythm, no obvious murmurs, no lower extremity edema  Gastrointestinal: non-rigid          Selected laboratory tests:   Laboratory test results personally reviewed:   [facility]  Recent Labs   Lab 07/05/23  1615 07/05/23  1319 07/05/23  1219 07/05/23  0814 07/05/23  0507 07/04/23  0804 07/04/23  0530 07/04/23  0350 07/04/23  0107 07/03/23  1955 07/03/23  1951 07/03/23  1723 07/03/23  1210 " 07/03/23  0413 07/03/23  0200 07/02/23  1635 07/02/23  1350 07/02/23  0732 07/02/23  0538 06/30/23  1612 06/30/23  1327   NA  --   --   --   --  136  --  138  --   --   --   --   --  141  --  147*  --  149*  --  152*   < > 151*   POTASSIUM  --   --   --   --  3.5  --  3.9  --  4.3  --   --   --  3.8  --  3.5  --  4.6  --  3.7   < > 4.6   CHLORIDE  --   --   --   --  87*  --  80*  --   --   --   --   --  85*  --  97*  --  98  --  104   < > 102   CO2  --   --   --   --  45*  --  >50*  --   --   --   --   --  >50*  --  49*  --  48*  --  45*   < > 44*   ANIONGAP  --   --   --   --  4*  --   --   --   --   --   --   --   --   --  1*  --  3*  --  3*   < > 5*   *  --  196* 158* 144*   < > 138*   < >  --    < >  --    < > 200*   < > 124*   < > 224*   < > 121*   < > 203*   BUN  --   --   --   --  33.7*  --  33.9*  --   --   --   --   --  31.4*  --  30.5*  --  37.3*  --  37.0*   < > 38.7*   CR  --   --   --   --  0.49*  --  0.52  --   --   --   --   --  0.43*  --  0.41*  --  0.49*  --  0.53   < > 0.52   GFRESTIMATED  --   --   --   --  >90  --  >90  --   --   --   --   --  >90  --  >90  --  >90  --  >90   < > >90   EDIN  --   --   --   --  8.8  --  8.9  --   --   --   --   --  8.1*  --  7.4*  --  8.3*  --  8.0*   < > 8.6   MAG  --   --   --   --  1.6*  --  2.1  --   --   --   --   --  2.1  --  1.8  --  1.9  --  2.2   < > 2.4*   PHOS  --  2.7  --   --  1.8*  --  2.7  --   --   --  2.9  --   --   --  2.2*   < >  --   --  1.6*   < >  --    PROTTOTAL  --   --   --   --   --   --   --   --   --   --   --   --   --   --   --   --   --   --   --   --  5.7*   ALBUMIN  --   --   --   --   --   --   --   --   --   --   --   --   --   --   --   --   --   --   --   --  3.3*   BILITOTAL  --   --   --   --   --   --   --   --   --   --   --   --   --   --   --   --   --   --   --   --  0.3   ALKPHOS  --   --   --   --   --   --   --   --   --   --   --   --   --   --   --   --   --   --   --   --  42   AST  --   --   --   --   --    --   --   --   --   --   --   --   --   --   --   --   --   --   --   --  43   ALT  --   --   --   --   --   --   --   --   --   --   --   --   --   --   --   --   --   --   --   --  53*    < > = values in this interval not displayed.     CBC  Recent Labs   Lab 07/05/23  0507 07/04/23  0530 07/03/23  0200 07/02/23  0538   WBC 11.3* 16.2* 13.3* 14.2*   RBC 2.62* 3.00* 2.27* 2.40*   HGB 8.7* 10.0* 7.6* 8.0*   HCT 26.6* 30.9* 25.1* 26.7*   * 103* 111* 111*   MCH 33.2* 33.3* 33.5* 33.3*   MCHC 32.7 32.4 30.3* 30.0*   RDW 13.7 13.8 14.0 13.7   * 140* 91* 106*     INRNo lab results found in last 7 days.  No results found for: TROPI, TROPONIN, TROPR, TROPN  Recent Labs   Lab Test 06/30/23  0505 06/29/23  1638   TRIG 97 76     Lab Results   Component Value Date    A1C 4.9 03/26/2023    A1C 4.4 12/01/2022     TSH   Date Value Ref Range Status   07/02/2023 0.31 0.30 - 4.20 uIU/mL Final   01/29/2006 4.68 0.4 - 5.0 mU/L Final

## 2023-07-06 ENCOUNTER — ANESTHESIA (OUTPATIENT)
Dept: INTENSIVE CARE | Facility: CLINIC | Age: 58
End: 2023-07-06
Payer: COMMERCIAL

## 2023-07-06 ENCOUNTER — ANESTHESIA EVENT (OUTPATIENT)
Dept: INTENSIVE CARE | Facility: CLINIC | Age: 58
End: 2023-07-06
Payer: COMMERCIAL

## 2023-07-06 ENCOUNTER — APPOINTMENT (OUTPATIENT)
Dept: GENERAL RADIOLOGY | Facility: CLINIC | Age: 58
End: 2023-07-06
Attending: SURGERY
Payer: COMMERCIAL

## 2023-07-06 LAB
ANION GAP SERPL CALCULATED.3IONS-SCNC: 6 MMOL/L (ref 7–15)
BASE EXCESS BLDV CALC-SCNC: 10.5 MMOL/L (ref -7.7–1.9)
BASE EXCESS BLDV CALC-SCNC: 12.2 MMOL/L (ref -7.7–1.9)
BASE EXCESS BLDV CALC-SCNC: 13.9 MMOL/L (ref -7.7–1.9)
BUN SERPL-MCNC: 32.3 MG/DL (ref 8–23)
CALCIUM SERPL-MCNC: 8.5 MG/DL (ref 8.6–10)
CHLORIDE SERPL-SCNC: 93 MMOL/L (ref 98–107)
CREAT SERPL-MCNC: 0.5 MG/DL (ref 0.51–0.95)
DEPRECATED HCO3 PLAS-SCNC: 36 MMOL/L (ref 22–29)
ERYTHROCYTE [DISTWIDTH] IN BLOOD BY AUTOMATED COUNT: 14.3 % (ref 10–15)
GFR SERPL CREATININE-BSD FRML MDRD: >90 ML/MIN/1.73M2
GLUCOSE BLDC GLUCOMTR-MCNC: 142 MG/DL (ref 70–99)
GLUCOSE BLDC GLUCOMTR-MCNC: 144 MG/DL (ref 70–99)
GLUCOSE BLDC GLUCOMTR-MCNC: 147 MG/DL (ref 70–99)
GLUCOSE BLDC GLUCOMTR-MCNC: 156 MG/DL (ref 70–99)
GLUCOSE BLDC GLUCOMTR-MCNC: 158 MG/DL (ref 70–99)
GLUCOSE BLDC GLUCOMTR-MCNC: 169 MG/DL (ref 70–99)
GLUCOSE SERPL-MCNC: 158 MG/DL (ref 70–99)
HCO3 BLDV-SCNC: 36 MMOL/L (ref 21–28)
HCO3 BLDV-SCNC: 37 MMOL/L (ref 21–28)
HCO3 BLDV-SCNC: 39 MMOL/L (ref 21–28)
HCT VFR BLD AUTO: 24.4 % (ref 35–47)
HGB BLD-MCNC: 8.1 G/DL (ref 11.7–15.7)
MAGNESIUM SERPL-MCNC: 1.8 MG/DL (ref 1.7–2.3)
MCH RBC QN AUTO: 33.3 PG (ref 26.5–33)
MCHC RBC AUTO-ENTMCNC: 33.2 G/DL (ref 31.5–36.5)
MCV RBC AUTO: 100 FL (ref 78–100)
O2/TOTAL GAS SETTING VFR VENT: 35 %
O2/TOTAL GAS SETTING VFR VENT: 50 %
O2/TOTAL GAS SETTING VFR VENT: 65 %
PCO2 BLDV: 50 MM HG (ref 40–50)
PCO2 BLDV: 53 MM HG (ref 40–50)
PCO2 BLDV: 56 MM HG (ref 40–50)
PH BLDV: 7.42 [PH] (ref 7.32–7.43)
PH BLDV: 7.46 [PH] (ref 7.32–7.43)
PH BLDV: 7.5 [PH] (ref 7.32–7.43)
PHOSPHATE SERPL-MCNC: 1.4 MG/DL (ref 2.5–4.5)
PHOSPHATE SERPL-MCNC: 2.3 MG/DL (ref 2.5–4.5)
PLATELET # BLD AUTO: 151 10E3/UL (ref 150–450)
PO2 BLDV: 36 MM HG (ref 25–47)
PO2 BLDV: 36 MM HG (ref 25–47)
PO2 BLDV: 43 MM HG (ref 25–47)
POTASSIUM SERPL-SCNC: 3.4 MMOL/L (ref 3.4–5.3)
POTASSIUM SERPL-SCNC: 3.4 MMOL/L (ref 3.4–5.3)
POTASSIUM SERPL-SCNC: 4.1 MMOL/L (ref 3.4–5.3)
RBC # BLD AUTO: 2.43 10E6/UL (ref 3.8–5.2)
SODIUM SERPL-SCNC: 135 MMOL/L (ref 136–145)
WBC # BLD AUTO: 10.6 10E3/UL (ref 4–11)

## 2023-07-06 PROCEDURE — 250N000013 HC RX MED GY IP 250 OP 250 PS 637: Performed by: INTERNAL MEDICINE

## 2023-07-06 PROCEDURE — 84132 ASSAY OF SERUM POTASSIUM: CPT | Performed by: INTERNAL MEDICINE

## 2023-07-06 PROCEDURE — 250N000011 HC RX IP 250 OP 636: Performed by: SURGERY

## 2023-07-06 PROCEDURE — 84100 ASSAY OF PHOSPHORUS: CPT | Performed by: INTERNAL MEDICINE

## 2023-07-06 PROCEDURE — 370N000003 HC ANESTHESIA WARD SERVICE

## 2023-07-06 PROCEDURE — 94003 VENT MGMT INPAT SUBQ DAY: CPT

## 2023-07-06 PROCEDURE — 999N000065 XR CHEST PORT 1 VIEW

## 2023-07-06 PROCEDURE — 250N000013 HC RX MED GY IP 250 OP 250 PS 637: Performed by: SURGERY

## 2023-07-06 PROCEDURE — 99233 SBSQ HOSP IP/OBS HIGH 50: CPT | Performed by: INTERNAL MEDICINE

## 2023-07-06 PROCEDURE — 82803 BLOOD GASES ANY COMBINATION: CPT | Performed by: SURGERY

## 2023-07-06 PROCEDURE — 258N000003 HC RX IP 258 OP 636: Performed by: INTERNAL MEDICINE

## 2023-07-06 PROCEDURE — 94640 AIRWAY INHALATION TREATMENT: CPT | Mod: 76

## 2023-07-06 PROCEDURE — 250N000009 HC RX 250: Performed by: INTERNAL MEDICINE

## 2023-07-06 PROCEDURE — 250N000011 HC RX IP 250 OP 636

## 2023-07-06 PROCEDURE — 999N000157 HC STATISTIC RCP TIME EA 10 MIN

## 2023-07-06 PROCEDURE — 250N000011 HC RX IP 250 OP 636: Performed by: INTERNAL MEDICINE

## 2023-07-06 PROCEDURE — 99232 SBSQ HOSP IP/OBS MODERATE 35: CPT | Performed by: INTERNAL MEDICINE

## 2023-07-06 PROCEDURE — 250N000011 HC RX IP 250 OP 636: Mod: JZ | Performed by: INTERNAL MEDICINE

## 2023-07-06 PROCEDURE — 83735 ASSAY OF MAGNESIUM: CPT | Performed by: INTERNAL MEDICINE

## 2023-07-06 PROCEDURE — 99291 CRITICAL CARE FIRST HOUR: CPT | Performed by: SURGERY

## 2023-07-06 PROCEDURE — 250N000013 HC RX MED GY IP 250 OP 250 PS 637: Performed by: HOSPITALIST

## 2023-07-06 PROCEDURE — 250N000009 HC RX 250: Performed by: SURGERY

## 2023-07-06 PROCEDURE — 200N000001 HC R&B ICU

## 2023-07-06 PROCEDURE — 80048 BASIC METABOLIC PNL TOTAL CA: CPT | Performed by: INTERNAL MEDICINE

## 2023-07-06 PROCEDURE — 94640 AIRWAY INHALATION TREATMENT: CPT

## 2023-07-06 PROCEDURE — 85027 COMPLETE CBC AUTOMATED: CPT | Performed by: INTERNAL MEDICINE

## 2023-07-06 PROCEDURE — 250N000011 HC RX IP 250 OP 636: Mod: JZ | Performed by: SURGERY

## 2023-07-06 PROCEDURE — 999N000259 HC STATISTIC EXTUBATION

## 2023-07-06 RX ORDER — AMIODARONE HYDROCHLORIDE 200 MG/1
200 TABLET ORAL DAILY
Status: DISCONTINUED | OUTPATIENT
Start: 2023-07-12 | End: 2023-07-22

## 2023-07-06 RX ORDER — HYDROXYZINE HYDROCHLORIDE 25 MG/1
25 TABLET, FILM COATED ORAL EVERY 6 HOURS PRN
Status: DISCONTINUED | OUTPATIENT
Start: 2023-07-06 | End: 2023-07-06 | Stop reason: ALTCHOICE

## 2023-07-06 RX ORDER — MAGNESIUM SULFATE HEPTAHYDRATE 40 MG/ML
2 INJECTION, SOLUTION INTRAVENOUS ONCE
Status: COMPLETED | OUTPATIENT
Start: 2023-07-06 | End: 2023-07-06

## 2023-07-06 RX ORDER — HYDROXYZINE HCL 10 MG/5 ML
50 SOLUTION, ORAL ORAL EVERY 6 HOURS PRN
Status: DISCONTINUED | OUTPATIENT
Start: 2023-07-06 | End: 2023-07-28 | Stop reason: HOSPADM

## 2023-07-06 RX ORDER — PROPOFOL 10 MG/ML
5-75 INJECTION, EMULSION INTRAVENOUS CONTINUOUS
Status: DISCONTINUED | OUTPATIENT
Start: 2023-07-06 | End: 2023-07-08

## 2023-07-06 RX ORDER — POTASSIUM CHLORIDE 20MEQ/15ML
40 LIQUID (ML) ORAL ONCE
Status: COMPLETED | OUTPATIENT
Start: 2023-07-06 | End: 2023-07-06

## 2023-07-06 RX ORDER — PROCHLORPERAZINE MALEATE 5 MG
10 TABLET ORAL EVERY 6 HOURS PRN
Status: DISCONTINUED | OUTPATIENT
Start: 2023-07-06 | End: 2023-07-28 | Stop reason: HOSPADM

## 2023-07-06 RX ORDER — LEVALBUTEROL INHALATION SOLUTION 0.63 MG/3ML
0.63 SOLUTION RESPIRATORY (INHALATION) 4 TIMES DAILY
Status: DISCONTINUED | OUTPATIENT
Start: 2023-07-06 | End: 2023-07-06

## 2023-07-06 RX ORDER — PROCHLORPERAZINE 25 MG
25 SUPPOSITORY, RECTAL RECTAL EVERY 12 HOURS PRN
Status: DISCONTINUED | OUTPATIENT
Start: 2023-07-06 | End: 2023-07-28 | Stop reason: HOSPADM

## 2023-07-06 RX ORDER — DEXMEDETOMIDINE HYDROCHLORIDE 4 UG/ML
.1-1.2 INJECTION, SOLUTION INTRAVENOUS CONTINUOUS
Status: DISCONTINUED | OUTPATIENT
Start: 2023-07-06 | End: 2023-07-15

## 2023-07-06 RX ORDER — PROPOFOL 10 MG/ML
INJECTION, EMULSION INTRAVENOUS
Status: COMPLETED | OUTPATIENT
Start: 2023-07-06 | End: 2023-07-06

## 2023-07-06 RX ORDER — HYDROXYZINE HCL 10 MG/5 ML
25 SOLUTION, ORAL ORAL EVERY 6 HOURS PRN
Status: DISCONTINUED | OUTPATIENT
Start: 2023-07-06 | End: 2023-07-28 | Stop reason: HOSPADM

## 2023-07-06 RX ORDER — AMIODARONE HYDROCHLORIDE 200 MG/1
400 TABLET ORAL 2 TIMES DAILY
Status: COMPLETED | OUTPATIENT
Start: 2023-07-06 | End: 2023-07-11

## 2023-07-06 RX ORDER — ALBUTEROL SULFATE 0.83 MG/ML
2.5 SOLUTION RESPIRATORY (INHALATION) EVERY 4 HOURS PRN
Status: DISCONTINUED | OUTPATIENT
Start: 2023-07-06 | End: 2023-07-28 | Stop reason: HOSPADM

## 2023-07-06 RX ORDER — OXYCODONE HCL 5 MG/5 ML
5 SOLUTION, ORAL ORAL EVERY 4 HOURS PRN
Status: DISCONTINUED | OUTPATIENT
Start: 2023-07-06 | End: 2023-07-06

## 2023-07-06 RX ORDER — HYDROXYZINE HYDROCHLORIDE 50 MG/1
50 TABLET, FILM COATED ORAL EVERY 6 HOURS PRN
Status: DISCONTINUED | OUTPATIENT
Start: 2023-07-06 | End: 2023-07-06 | Stop reason: ALTCHOICE

## 2023-07-06 RX ORDER — OXYCODONE HCL 5 MG/5 ML
5 SOLUTION, ORAL ORAL EVERY 4 HOURS PRN
Status: DISCONTINUED | OUTPATIENT
Start: 2023-07-06 | End: 2023-07-09

## 2023-07-06 RX ORDER — POLYETHYLENE GLYCOL 3350 17 G/17G
17 POWDER, FOR SOLUTION ORAL DAILY PRN
Status: DISCONTINUED | OUTPATIENT
Start: 2023-07-06 | End: 2023-07-28 | Stop reason: HOSPADM

## 2023-07-06 RX ADMIN — LEVALBUTEROL HYDROCHLORIDE 0.63 MG: 0.63 SOLUTION RESPIRATORY (INHALATION) at 08:58

## 2023-07-06 RX ADMIN — IPRATROPIUM BROMIDE 0.5 MG: 0.5 SOLUTION RESPIRATORY (INHALATION) at 19:54

## 2023-07-06 RX ADMIN — AMIODARONE HYDROCHLORIDE 400 MG: 200 TABLET ORAL at 08:00

## 2023-07-06 RX ADMIN — Medication 80 MG: at 22:48

## 2023-07-06 RX ADMIN — APIXABAN 5 MG: 5 TABLET, FILM COATED ORAL at 08:01

## 2023-07-06 RX ADMIN — Medication 40 MG: at 08:01

## 2023-07-06 RX ADMIN — CEFEPIME 2 G: 2 INJECTION, POWDER, FOR SOLUTION INTRAVENOUS at 10:45

## 2023-07-06 RX ADMIN — DILTIAZEM HYDROCHLORIDE 90 MG: 30 TABLET, FILM COATED ORAL at 17:04

## 2023-07-06 RX ADMIN — POTASSIUM & SODIUM PHOSPHATES POWDER PACK 280-160-250 MG 1 PACKET: 280-160-250 PACK at 17:04

## 2023-07-06 RX ADMIN — CEFEPIME 2 G: 2 INJECTION, POWDER, FOR SOLUTION INTRAVENOUS at 02:47

## 2023-07-06 RX ADMIN — NICOTINE 1 PATCH: 14 PATCH, EXTENDED RELEASE TRANSDERMAL at 08:02

## 2023-07-06 RX ADMIN — POTASSIUM & SODIUM PHOSPHATES POWDER PACK 280-160-250 MG 1 PACKET: 280-160-250 PACK at 23:59

## 2023-07-06 RX ADMIN — POTASSIUM & SODIUM PHOSPHATES POWDER PACK 280-160-250 MG 1 PACKET: 280-160-250 PACK at 20:03

## 2023-07-06 RX ADMIN — CEFEPIME 2 G: 2 INJECTION, POWDER, FOR SOLUTION INTRAVENOUS at 18:27

## 2023-07-06 RX ADMIN — PAROXETINE 20 MG: 10 SUSPENSION ORAL at 08:01

## 2023-07-06 RX ADMIN — IPRATROPIUM BROMIDE 0.5 MG: 0.5 SOLUTION RESPIRATORY (INHALATION) at 15:37

## 2023-07-06 RX ADMIN — LEVALBUTEROL HYDROCHLORIDE 0.63 MG: 0.63 SOLUTION RESPIRATORY (INHALATION) at 15:37

## 2023-07-06 RX ADMIN — POTASSIUM CHLORIDE 40 MEQ: 20 SOLUTION ORAL at 11:55

## 2023-07-06 RX ADMIN — MIDAZOLAM 2 MG: 1 INJECTION INTRAMUSCULAR; INTRAVENOUS at 23:03

## 2023-07-06 RX ADMIN — PROPOFOL 150 MG: 10 INJECTION, EMULSION INTRAVENOUS at 22:48

## 2023-07-06 RX ADMIN — DILTIAZEM HYDROCHLORIDE 90 MG: 30 TABLET, FILM COATED ORAL at 11:55

## 2023-07-06 RX ADMIN — MIDAZOLAM 2 MG: 1 INJECTION INTRAMUSCULAR; INTRAVENOUS at 20:54

## 2023-07-06 RX ADMIN — POTASSIUM CHLORIDE 40 MEQ: 20 SOLUTION ORAL at 06:07

## 2023-07-06 RX ADMIN — MAGNESIUM SULFATE HEPTAHYDRATE 2 G: 2 INJECTION, SOLUTION INTRAVENOUS at 06:07

## 2023-07-06 RX ADMIN — Medication 15 ML: at 08:00

## 2023-07-06 RX ADMIN — PROPOFOL 20 MCG/KG/MIN: 10 INJECTION, EMULSION INTRAVENOUS at 04:50

## 2023-07-06 RX ADMIN — IPRATROPIUM BROMIDE 0.5 MG: 0.5 SOLUTION RESPIRATORY (INHALATION) at 11:34

## 2023-07-06 RX ADMIN — LEVOTHYROXINE SODIUM 112 MCG: 0.11 TABLET ORAL at 08:00

## 2023-07-06 RX ADMIN — IPRATROPIUM BROMIDE 0.5 MG: 0.5 SOLUTION RESPIRATORY (INHALATION) at 08:58

## 2023-07-06 RX ADMIN — Medication 50 MCG/HR: at 02:46

## 2023-07-06 RX ADMIN — Medication 50 MCG: at 07:58

## 2023-07-06 RX ADMIN — LEVALBUTEROL HYDROCHLORIDE 0.63 MG: 0.63 SOLUTION RESPIRATORY (INHALATION) at 11:34

## 2023-07-06 RX ADMIN — DEXMEDETOMIDINE HYDROCHLORIDE 0.2 MCG/KG/HR: 400 INJECTION INTRAVENOUS at 11:05

## 2023-07-06 RX ADMIN — SODIUM PHOSPHATE, MONOBASIC, MONOHYDRATE AND SODIUM PHOSPHATE, DIBASIC, ANHYDROUS 15 MMOL: 142; 276 INJECTION, SOLUTION INTRAVENOUS at 06:35

## 2023-07-06 RX ADMIN — ATORVASTATIN CALCIUM 20 MG: 20 TABLET, FILM COATED ORAL at 08:01

## 2023-07-06 RX ADMIN — PROPOFOL 10 MCG/KG/MIN: 10 INJECTION, EMULSION INTRAVENOUS at 22:50

## 2023-07-06 RX ADMIN — LEVALBUTEROL HYDROCHLORIDE 0.63 MG: 0.63 SOLUTION RESPIRATORY (INHALATION) at 19:54

## 2023-07-06 RX ADMIN — DILTIAZEM HYDROCHLORIDE 90 MG: 30 TABLET, FILM COATED ORAL at 05:18

## 2023-07-06 RX ADMIN — AMIODARONE HYDROCHLORIDE 400 MG: 200 TABLET ORAL at 20:03

## 2023-07-06 RX ADMIN — APIXABAN 5 MG: 5 TABLET, FILM COATED ORAL at 20:03

## 2023-07-06 ASSESSMENT — ACTIVITIES OF DAILY LIVING (ADL)
ADLS_ACUITY_SCORE: 36
ADLS_ACUITY_SCORE: 32
ADLS_ACUITY_SCORE: 36

## 2023-07-06 NOTE — PROGRESS NOTES
Patient on pressure support throughout the night and into the morning. Per MD order, patient extubated at 1125 to HFNC. Placed on 50L and 100%. Titrated oxygen to 85% and will continue to titrate as able. Patient is receiving Xopenex and Atrovent QID, tolerating well. Breath sounds are clear, slightly coarse and diminished. Equal bilaterally. Orally suctioned for small thick white/cloudy secretions.

## 2023-07-06 NOTE — PROGRESS NOTES
"[facility] ICU VENTILATOR RESPIRATORY NOTE     Date of Admission: 6/20/23     Date of Intubation (most recent): 6/21/23     Reason for Mechanical Ventilation: Respiratory failure     Number of Days on Mechanical Ventilation: 16     Met Criteria for Pressure Support Trial: yes, ongoing. Settings 8/5, 30     ABG Results:     Recent Labs   Lab 07/05/23  1319 07/05/23  0507 07/04/23  1547 07/04/23  0738 07/03/23  0950 07/02/23  0751 07/01/23  0808   PH  --   --   --  7.55* 7.45 7.42 7.27*   PCO2  --   --   --  69* 84* 81* 103*   PO2  --   --   --  83 54* 71* 197*   HCO3  --   --   --  60* 59* 52* 47*   O2PER 45 30 40 40 40 50 70       ETT appearance on chest x-ray: 5.4 cm above stevie     Plan:  Patient remains on ventilator. RT to follow    Vent Mode: CPAP/PS  (Continuous positive airway pressure with Pressure Support)  FiO2 (%): 30 %  Resp Rate (Set): 14 breaths/min  Tidal Volume (Set, mL): 330 mL  PEEP (cm H2O): 5 cmH2O  Pressure Support (cm H2O): 8 cmH2O  Resp: 16    Vital signs:  Temp: 99.3  F (37.4  C) Temp src: Bladder BP: 129/78 Pulse: 93   Resp: 16 SpO2: 94 % O2 Device: Mechanical Ventilator   Height: 160 cm (5' 3\") Weight: 68.1 kg (150 lb 2.1 oz)  Estimated body mass index is 26.59 kg/m  as calculated from the following:    Height as of this encounter: 1.6 m (5' 3\").    Weight as of this encounter: 68.1 kg (150 lb 2.1 oz).            "

## 2023-07-06 NOTE — PLAN OF CARE
ICU End of Shift Summary.  For vital signs and complete assessments, please see documentation flowsheets.      Pertinent assessments: RASS 0 to -1. Shakes head yes/no to questions. Afebrile. Tele SR with PACs. BP wnl. Lungs coarse. Moderate amount of oral secretions. Continues on vent support. Hines in place with adequate urine output. BM x2. Keofeed in place, tolerating tube feed.   Major Shift Events: none  Plan (Upcoming Events): Continue current plan of care  Discharge/Transfer Needs: Continue current plan of care     Bedside Shift Report Completed : Y  Bedside Safety Check Completed: Y

## 2023-07-06 NOTE — PLAN OF CARE
Goal Outcome Evaluation:          ICU End of Shift Summary.  For vital signs and complete assessments, please see documentation flowsheets.      Pertinent assessments:   Neuro: RASS 0-1, alert and responsive, still with weakness on all limbs.   Cardiac: SR with PAC's, afebrile, BP within normal limits.  Resp: On BIPAP 10/5, Fio2 35%, LS: coarse crackles Moderate amount of oral secretions  GI: TF 65 mls with flusing 30 mls q4hr tolerating. BM 3x loose.  : IFC in place draining good amount of urine  Skin: With small skin tear a left arm   Lines: PICC line 3 lumen at right basilic vein and PIV's at right forearm  Drips: Precedex drip .3mcg    Major Shift Events:     Stop fentanyl and profopol and shifted to precedex.     Extubated placed on High flow nasal cannula and shifted to BIPAP due to C02 retention.      Plan (Upcoming Events): Continue on BIPAP and monitor VBG as ordered.   Discharge/Transfer Needs: TBD     Bedside Shift Report Completed : Y  Bedside Safety Check Completed: Y

## 2023-07-06 NOTE — PROGRESS NOTES
Critical Care  Note      07/06/2023    Name: Lara Melendez MRN#: [id number]   Age: 57 year old YOB: 1965     Hsptl Day# 15  ICU DAY # 15    MV DAY # 15             Problem List:   Principal Problem:    Paroxysmal atrial fibrillation with RVR (H)  Active Problems:    COPD exacerbation (H)    Anticoagulated    Acute and chronic respiratory failure with hypercapnia (H)           Summary/Hospital Course:   57-year-old female with history of COPD, emphysema, O2 dependent at home, tobacco dependence, hypertension, anxiety, atrial fibrillation on Eliquis, hypothyroidism presents with worsening shortness of breath and wheezing.  She appears to be in status asthmaticus versus worsening COPD exacerbation.  Patient was prescribed azithromycin and prednisone prior to admission.  She continued decline was brought to EMS for evaluation.  Where she was given IV steroids, IV magnesium nebulizers and BiPAP.  After admission to the ICU she failed a BiPAP trial and required intubation mechanical ventilation.  She had significant bronchospasm with high airway pressures requiring deep sedation with fentanyl ketamine and propofol and addition of a paralytic.  Events of last 24 hours noted for episodes of RVR with associated atrial fibrillation and SVT.  Patient was cardioverted back into sinus rhythm with frequent PACs. .  She was weaned off vecuronium.  She did have an episode of A-fib with RVR with relative soft pressures for which she underwent a cardioversion which converted her to atrial fib with rate control.  We are weaning her propofol actively and phenylephrine also.    Assessment and plan :     Lara Melendez IS a 57 year old female admitted on 6/20/2023 for, acute respiratory failure secondary to COPD exacerbation, small left-sided pneumothorax and history of atrial fibrillation on Eliquis..   I have personally reviewed the daily labs, imaging studies, cultures and discussed the case with referring physician  and consulting physicians.     My assessment and plan by system for this patient is as follows:    Neurology/Psychiatry:   1. Sedation  2  Pain Mgt  3. ICU Delirium   4. Neuromuscular weakness of critical illness    Plan    Remained on Propofol yesterday/on  Transition to Precedex  Discontinue Fentanyl drip  PT/OT eval and treat    Cardiovascular:   1.Hemodynamics -normotensive to hypertensive  2.Rhythm sinus with PACs  3. Ischemia -no signs of ischemia  4. A-fib with episodes of RVR: required cardioversion this admission.  Plan  Rate controlled  Appreciate cardiology input  Continue p.o. amiodarone.   Continue p.o. cardizem  Mag >2  K+ >4  On Apixiban for anticoagulation    Pulmonary/Ventilator Management:   1. Airway intubated for acute hypoxic and hypercapnic respiratory failure  2. Oxygenation/ventilation/mechanics on full mechanical ventilatory support  Plan  -Overall her respiratory status seems to be slowly improving.  We were able to increase tidal volumel Her airway pressures have not significantly increased and her compensated respiratory acidosis has improved.      Vent Mode: CPAP/PS  (Continuous positive airway pressure with Pressure Support)  FiO2 (%): 35 %  Resp Rate (Set): 14 breaths/min  Tidal Volume (Set, mL): 330 mL  PEEP (cm H2O): 5 cmH2O  Pressure Support (cm H2O): 7 cmH2O  Resp: 16    Passed PST again today  Able to lift head and has good cough  Minimal secretions    Plan to extubate today  Place on HFNC; ok to titrate to lowest settings, but do not discontinue or transition off    GI and Nutrition :   1.  Continue tube feeds at goal   - Patient does not meet criteria for malnutrition at this time    Plan  -continue enteral feeds.    Renal/Fluids/Electrolytes:   1.  No acute renal injury at this time  2.  Hypernatremia: resolved  3.  UnCompensated metabolic alkalosis: suspect component of contraction alkalosis from auto diuresis  4.  Appears euvolemic  Plan  - monitor function and electrolytes  as needed with replacement per ICU protocols. - generally avoid nephrotoxic agents such as NSAID, IV  contrast unless specifically required  - adjust medications as needed for renal clearance  - follow I/O's as appropriate.      Infectious Disease:   1.  Sepsis   UTI: Pseudomonas aeruginosa   positive sputum culture: Klebsiella oxytoca    Plan  -Continue patient on cefepime timed to complete today      Endocrine:   1.  Concern for stress-induced hyperglycemia  2.  Concern for diabetes induced hyperglycemia  3.  Plan  - ICU insulin protocol, goal sugar <180      Hematology/Oncology:   1.  Leukocytosis: resolved  2.  Anemia: multifactorial  3.  Medication induced coagulopathy: On Eliquis for atrial fibrillation  Plan  -Continue to monitor     IV/Access:   1. Venous access -central access  2. Arterial access - n/a  Plan  - central access required and necessary      ICU Prophylaxis:   1. DVT: On Eliquis  2. VAP: HOB 30 degrees, chlorhexidine rinse  3. Stress Ulcer: PPI/H2 blocker  4. Restraints: Nonviolent soft two point restraints required and necessary for patient safety and continued cares and good effect as patient continues to pull at necessary lines, tubes despite education and distraction. Will readdress daily.   5. Wound care  -local wound care  6. Feeding -continue tube feeds  7. Family Update:   8. Disposition -ICU        Key goals for next 24 hours:   1.  Extubate to HFNC  2.  Complete abx today  3. PT/OT consults           Medical History:     Past Medical History:   Diagnosis Date     Anxiety      COPD (chronic obstructive pulmonary disease) - 2L home O2      Diverticulitis of colon      Hypercholesterolemia      Hypertension      Hypothyroidism      Paroxysmal atrial fibrillation      Psoriasis      Pulmonary nodule - left upper lobe      Past Surgical History:   Procedure Laterality Date     CHOLECYSTECTOMY       INCISION AND DRAINAGE MANDIBLE, COMBINED Left 01/10/2022    Procedure: INCISION AND DRAINAGE,  MANDIBLE;  Surgeon: Jn Feliz DDS;  Location: [facility] OR     INCISION AND DRAINAGE MANDIBLE, COMBINED Left 02/04/2022    Procedure: INCISION AND DRAINAGE, MANDIBLE;  Surgeon: Taylor Ortez DDS;  Location: [facility] OR     TOOTH EXTRACTION       Vocal Cord surgery       Social History     Socioeconomic History     Marital status:      Spouse name: Not on file     Number of children: Not on file     Years of education: Not on file     Highest education level: Not on file   Occupational History     Not on file   Tobacco Use     Smoking status: Never     Smokeless tobacco: Never   Substance and Sexual Activity     Alcohol use: Yes     Comment: occ     Drug use: Never     Sexual activity: Not Currently   Other Topics Concern     Not on file   Social History Narrative     Not on file     Social Determinants of Health     Financial Resource Strain: Not on file   Food Insecurity: Not on file   Transportation Needs: Not on file   Physical Activity: Not on file   Stress: Not on file   Social Connections: Not on file   Intimate Partner Violence: Not on file   Housing Stability: Not on file        Allergies   Allergen Reactions     Sulfa Antibiotics      Doxycycline Other (See Comments)     Develops chest tightness  Intolerance not allergy     Penicillins Rash     Generalized rash  Rash, Generalized                Key Medications:       [START ON 7/12/2023] amiodarone  200 mg Oral Daily     amiodarone  400 mg Oral BID     apixaban ANTICOAGULANT  5 mg Per Feeding Tube BID     atorvastatin  20 mg Per Feeding Tube Daily     ceFEPIme  2 g Intravenous Q8H     diltiazem  90 mg Oral or Feeding Tube Q6H [facility]     insulin aspart  1-12 Units Subcutaneous Q4H     ipratropium  0.5 mg Nebulization 4x daily     levalbuterol  0.63 mg Nebulization 4x Daily     levothyroxine  112 mcg Per Feeding Tube Daily     multivitamins w/minerals  15 mL Per Feeding Tube Daily     nicotine  1 patch Transdermal Daily     nicotine   Transdermal Q8H      pantoprazole  40 mg Per Feeding Tube Daily     PARoxetine  20 mg Per Feeding Tube Daily     potassium chloride  40 mEq Oral or Feeding Tube Once     protein modular  1 packet Per Feeding Tube Daily     sodium chloride (PF)  10-40 mL Intracatheter Q8H     sodium chloride (PF)  3 mL Intracatheter Q8H       dexmedetomidine 0.2 mcg/kg/hr (07/06/23 1105)     lactated ringers Stopped (06/28/23 0011)     - MEDICATION INSTRUCTIONS -          Home Meds  No current facility-administered medications on file prior to encounter.  albuterol (PROAIR HFA/PROVENTIL HFA/VENTOLIN HFA) 108 (90 Base) MCG/ACT inhaler, Inhale 2 puffs into the lungs every 4 hours as needed for shortness of breath / dyspnea or wheezing Inhale 1-2 Puffs every 4 hours as needed for Wheezing.  albuterol (PROVENTIL) (2.5 MG/3ML) 0.083% neb solution, Take 1 vial (2.5 mg) by nebulization every 4 hours as needed for shortness of breath or wheezing  apixaban ANTICOAGULANT (ELIQUIS) 5 MG tablet, Take 1 tablet (5 mg) by mouth 2 times daily  atorvastatin (LIPITOR) 20 MG tablet, Take 20 mg by mouth daily  clonazePAM (KLONOPIN) 0.5 MG tablet, Take 0.5 mg by mouth daily  cyclobenzaprine (FLEXERIL) 5 MG tablet, Take 1 tablet (5 mg) by mouth every 8 hours as needed for muscle spasms  diclofenac (VOLTAREN) 1 % topical gel, Apply 2 g topically 4 times daily  diltiazem ER COATED BEADS (CARDIZEM CD/CARTIA XT) 240 MG 24 hr capsule, Take 1 capsule (240 mg) by mouth daily  fluticasone-salmeterol (ADVAIR-HFA) 230-21 MCG/ACT inhaler, Inhale 2 puffs into the lungs 2 times daily  ipratropium - albuterol 0.5 mg/2.5 mg/3 mL (DUONEB) 0.5-2.5 (3) MG/3ML neb solution, Take 1 vial (3 mLs) by nebulization every 6 hours as needed for shortness of breath / dyspnea or wheezing  levothyroxine (SYNTHROID/LEVOTHROID) 112 MCG tablet, Take 112 mcg by mouth daily  losartan (COZAAR) 25 MG tablet, Take 1 tablet (25 mg) by mouth daily  nicotine (NICODERM CQ) 21 MG/24HR 24 hr patch, Place 1 patch onto  the skin daily  PARoxetine (PAXIL) 20 MG tablet, Take 20 mg by mouth daily  predniSONE (DELTASONE) 20 MG tablet, Take 60 mg for 2 days, then 40 mg for 3 days, then 20 mg for 3 days, then 10 mg for 3 days, then stop  tiotropium (SPIRIVA RESPIMAT) 2.5 MCG/ACT inhaler, Inhale 2 puffs into the lungs daily as needed               Physical Examination:   Temp:  [98.2  F (36.8  C)-99.5  F (37.5  C)] 98.8  F (37.1  C)  Pulse:  [74-93] 84  Resp:  [8-28] 16  BP: (105-129)/(52-78) 112/56  FiO2 (%):  [30 %-45 %] 35 %  SpO2:  [88 %-100 %] 92 %    Intake/Output Summary (Last 24 hours) at 6/26/2023 1215  Last data filed at 6/26/2023 1200  Gross per 24 hour   Intake 2195.19 ml   Output 4555 ml   Net -2359.81 ml     Wt Readings from Last 4 Encounters:   07/06/23 68.1 kg (150 lb 2.1 oz)   04/01/23 68.8 kg (151 lb 9.6 oz)   02/11/23 60 kg (132 lb 4.4 oz)   12/02/22 61.3 kg (135 lb 2.3 oz)     BP - Mean:  [75-99] 75  Vent Mode: CPAP/PS  (Continuous positive airway pressure with Pressure Support)  FiO2 (%): 35 %  Resp Rate (Set): 14 breaths/min  Tidal Volume (Set, mL): 330 mL  PEEP (cm H2O): 5 cmH2O  Pressure Support (cm H2O): 7 cmH2O  Resp: 16    Recent Labs   Lab 07/06/23  0743 07/05/23  1319 07/05/23  0507 07/04/23  1547 07/04/23  0738 07/03/23  0950 07/02/23  0751 07/01/23  0808   PH  --   --   --   --  7.55* 7.45 7.42 7.27*   PCO2  --   --   --   --  69* 84* 81* 103*   PO2  --   --   --   --  83 54* 71* 197*   HCO3  --   --   --   --  60* 59* 52* 47*   O2PER 35 45 30 40 40 40 50 70       GEN: no acute distress consistent with chemical sedation  HEENT: head ncat, sclera anicteric, OP patent, trachea midline   PULM: unlabored synchronous with vent, diffuse wheezes anteriorly but improving overall however wheezes increased this morning, chest tube in good position show signs of titling however no air leak  CV/COR: Irregular rate and rhythm S1S2 no gallop,  No rub, no murmur  ABD: soft nontender, hypoactive bowel sounds, no mass  EXT:   Edema   warm  NEURO: Does not follow commands, consistent with chemical sedation  SKIN: no obvious rash  LINES: clean, dry intact         Data:   All data and imaging reviewed     ROUTINE ICU LABS (Last four results)  CMP  Recent Labs   Lab 07/06/23  1004 07/06/23  0804 07/06/23  0453 07/06/23  0331 07/05/23  2344 07/05/23  1615 07/05/23  1319 07/05/23  0814 07/05/23  0507 07/04/23  0804 07/04/23  0530 07/03/23  1723 07/03/23  1210 07/03/23  0413 07/03/23  0200 07/02/23  1635 07/02/23  1350 06/30/23  1612 06/30/23  1327   NA  --   --  135*  --   --   --   --   --  136  --  138  --  141  --  147*  --  149*   < > 151*   POTASSIUM 3.4  --  3.4  --   --   --   --   --  3.5  --  3.9   < > 3.8  --  3.5  --  4.6   < > 4.6   CHLORIDE  --   --  93*  --   --   --   --   --  87*  --  80*  --  85*  --  97*  --  98   < > 102   CO2  --   --  36*  --   --   --   --   --  45*  --  >50*  --  >50*  --  49*  --  48*   < > 44*   ANIONGAP  --   --  6*  --   --   --   --   --  4*  --   --   --   --   --  1*  --  3*   < > 5*   GLC  --  147* 158* 144* 156*   < >  --    < > 144*   < > 138*   < > 200*   < > 124*   < > 224*   < > 203*   BUN  --   --  32.3*  --   --   --   --   --  33.7*  --  33.9*  --  31.4*  --  30.5*  --  37.3*   < > 38.7*   CR  --   --  0.50*  --   --   --   --   --  0.49*  --  0.52  --  0.43*  --  0.41*  --  0.49*   < > 0.52   GFRESTIMATED  --   --  >90  --   --   --   --   --  >90  --  >90  --  >90  --  >90  --  >90   < > >90   EDIN  --   --  8.5*  --   --   --   --   --  8.8  --  8.9  --  8.1*  --  7.4*  --  8.3*   < > 8.6   MAG  --   --  1.8  --   --   --   --   --  1.6*  --  2.1  --  2.1  --  1.8  --  1.9   < > 2.4*   PHOS  --   --  1.4*  --   --   --  2.7  --  1.8*  --  2.7   < >  --   --  2.2*   < >  --    < >  --    PROTTOTAL  --   --   --   --   --   --   --   --   --   --   --   --   --   --   --   --   --   --  5.7*   ALBUMIN  --   --   --   --   --   --   --   --   --   --   --   --   --   --   --   --   --   --   3.3*   BILITOTAL  --   --   --   --   --   --   --   --   --   --   --   --   --   --   --   --   --   --  0.3   ALKPHOS  --   --   --   --   --   --   --   --   --   --   --   --   --   --   --   --   --   --  42   AST  --   --   --   --   --   --   --   --   --   --   --   --   --   --   --   --   --   --  43   ALT  --   --   --   --   --   --   --   --   --   --   --   --   --   --   --   --   --   --  53*    < > = values in this interval not displayed.     CBC  Recent Labs   Lab 07/06/23  0453 07/05/23  0507 07/04/23  0530 07/03/23  0200   WBC 10.6 11.3* 16.2* 13.3*   RBC 2.43* 2.62* 3.00* 2.27*   HGB 8.1* 8.7* 10.0* 7.6*   HCT 24.4* 26.6* 30.9* 25.1*    102* 103* 111*   MCH 33.3* 33.2* 33.3* 33.5*   MCHC 33.2 32.7 32.4 30.3*   RDW 14.3 13.7 13.8 14.0    137* 140* 91*     INR  No lab results found in last 7 days.  Arterial Blood Gas  Recent Labs   Lab 07/06/23  0743 07/05/23  1319 07/05/23  0507 07/04/23  1547 07/04/23  0738 07/03/23  0950 07/02/23  0751 07/01/23  0808   PH  --   --   --   --  7.55* 7.45 7.42 7.27*   PCO2  --   --   --   --  69* 84* 81* 103*   PO2  --   --   --   --  83 54* 71* 197*   HCO3  --   --   --   --  60* 59* 52* 47*   O2PER 35 45 30 40 40 40 50 70       All cultures:  No results for input(s): CULT in the last 168 hours.  Recent Results (from the past 24 hour(s))   XR Chest Port 1 View    Narrative    CHEST ONE VIEW  6/26/2023 9:29 AM     HISTORY: Increasing oxygen requirements and airway pressure.    COMPARISON: June 23, 2023      Impression    IMPRESSION: Endotracheal tube tip 4.3 cm from the stevie. Right PICC  line stable. Minimal pleural fluid or pleural thickening bilaterally  similar to previous. Small-to-moderate pneumothorax on the left new  from previous.    Results called to the patient's nurse on June 26, 2023 at 9:44 AM.     GHAZALA ORELLANA MD         SYSTEM ID:  [id number]         Billing: This patient is critically ill: yes. Total critical care time today 50  karina.            Kody Mackey, DO  Surgical Critical Care Medicine

## 2023-07-06 NOTE — PROGRESS NOTES
Hospitalist Medicine Progress Note   [facility]       Lara Melendez is a 57 year old female with PMH including COPD, emphysema, as needed 2-3 L O2 at home, tobacco dependence, hypertension, anxiety, paroxysmal atrial fibrillation on Eliquis, hypothyroidism, psoriasis, and hyperlipidemia who presented on 6/21 with worsening shortness of breath and wheezing.  Symptom onset was a few days prior to presentation and consisting of wheezing so her pulmonologist prescribed azithromycin and prednisone.  She took about 3 days of these medications.  Due to continued decline she called EMS and was brought to the ED for evaluation.     In the emergency department she had a clear COPD exacerbation so was given IV steroids, IV magnesium, multiple nebulizers and placed on BiPAP.  Imaging showed no clear evidence of pneumonia but she remained on azithromycin.  She was admitted to the ICU.     The day following admission her respiratory condition deteriorated to the point that she required intubation and mechanical ventilation.  She had significant bronchospasm and high airway pressures so required deep sedation with fentanyl, ketamine and propofol with addition of vecuronium.  She was given a continuous albuterol nebulizer with not much improvement.  Pulmonology was consulted as well.  The case was even discussed with [facility] and she is not an ECMO candidate.  Repeat x-ray 6/26 showed a left-sided pneumothorax and IR placed a chest tube, thoracic surgery was consulted.  Worsening hypernatremia with tube feeds, free water increased and D5 given with improvement.  She developed a fever to 102  F and she was started on IV clindamycin 6/26.  Persistent fevers and new leukocytosis antibiotics broadened to vancomycin and cefepime.  Blood cultures negative.  Sputum culture growing Klebsiella oxytoca resistant to IV Zosyn, no cefepime results so changed to IV meropenem given ongoing fevers.  Vancomycin discontinued  with negative MRSA screen.  Urine culture growing Pseudomonas.  She was able to wean off vecuronium and ketamine for 48 hours.  Aggressive IV diuresis has continued, now on Lasix drip     Acutely worsening 6/30 with A-fib with RVR and an alternate SVT not responsive to IV diltiazem or initial IV amiodarone load.  Cardioverted due to soft blood pressure and persistent rates of 170 and she converted to sinus tachycardia but still has frequent very short-lived runs of SVT lasting a few seconds.  She was continued on IV amiodarone loading.  Vent dyssynchrony during the day 6/30 and guppy breathing she was back under deep sedation with propofol, fentanyl, Versed, and vecuronium. Phenylephrine infusion added for hypotension.  Required electrical cardioversion again on 7/2.  Received digoxin load, now discontinued.  Dysrhythmias improved more recently and IV amiodarone converted to oral amiodarone and also on oral diltiazem.  Now off vecuronium.  IV meropenem narrowed to IV cefepime based on sputum and urine culture sensitivities.  Excellent urine output with Lasix drip previously and 1 dose of metolazone, holding diuretics today.  So far tolerating weaning of sedation and we will plan on starting spontaneous breathing trials.       Date of Admission:  6/20/2023  Assessment & Plan   Acute on chronic  hypoxemic and hypercapnic respiratory failure  COPD with acute exacerbation  Left-sided pneumothorax  Pneumonia secondary to Klebsiella oxytoca:  At baseline she is on chronic oxygen of 2 to 3 L by nasal cannula as needed.  She has had several admissions for severe COPD requiring BiPAP, although has not previously been intubated.  Initially during this hospitalization she was on BiPAP but she quickly deteriorated so was intubated 6/21.  She developed high airway pressures and bronchospasm so required deep sedation with fentanyl, ketamine, and propofol.  Vecuronium was added due to persistent high peak pressures. Continuous  albuterol nebulizer for 24 hours provided no benefit and even seem to increase bronchospasm and has been discontinued.    Chest x-ray 6/26 showed a left-sided pneumothorax likely barotrauma in setting of emphysema and high airway pressures due to severe COPD exacerbation.  Chest tube was placed by IR.  Finished a 5-day course of azithromycin earlier.  -Intensivist to manage ventilator  -Pulmonology consulted  -We have been using a low tidal volume strategy given high ventilator pressures resulting in pneumothorax which has resulted in compensated hypercapnia which she tolerated, now increasing tidal volumes as her respiratory status is improved  -Continue IV methylprednisolone 40 mg twice daily, likely can start to taper soon  -Receiving scheduled Xopenex and Atrovent nebs.   -Weaning sedation as able to get a better assessment of her respiratory status, so far tolerating sedation wean  -Excellent diuresis with Lasix drip and 1 dose of metolazone yesterday, over 8 L out which she tolerated.  Hold on diuretics today and see how much urine she puts out over the next 12 hours  -Sputum culture 6/26 is now growing Klebsiella oxytoca that is resistant to Zosyn, so she was put on IV meropenem while awaiting sensitivities and the Klebsiella is indeed sensitive to cefepime so we have switched back to this.  Has been on appropriate antibiotics for the positive culture since 6/28  -Thoracic surgery consulted for chest tube management.  Likely keep chest tube in until extubated.  -Now on day 13 of intubation.  We will see if we can wean sedation enough to do pressure support trials with goal of extubation within the next couple of days.  If she does poorly with this we will need to discuss tracheostomy.  I previously briefly discussed this with the patient's spouse and daughter and it seemed like they would be interested in pursuing this route if necessary as the patient has never expressed any wish to limit any medical  kia  -Dr. Ovalles previously discussed the case with [facility] on she is not an ECMO candidate due to her chronic respiratory failure.  -Patient was extubated 7/6/2023  -Now on high flow nasal cannula O2 at 50 L and 100% which was later titrated to 85%        Paroxysmal atrial fibrillation  SVT: PTA on diltiazem and Eliquis.  -Cardiology consulted  -Has been receiving Eliquis during hospitalization  -Developed issues with SVT rates in the 170s and A-fib with RVR refractory to diltiazem drip.  Received IV amiodarone load and required electrical cardioversion on 7/1 and 7/2.  Also received digoxin load that has now been discontinued.  -oral amiodarone per cardiology recommendations  -LVEF greater than 70% with normal RV function  - oral diltiazem 90 mg every 6 hours  -Avoiding beta-blockers due to severe COPD  -Patient in intermittent atrial fibrillation with RVR but now in sinus rhythm    Hypotension:  Secondary to sedation and cardiac meds.  -phenylephrine as needed, avoiding levophed due to tachyarrhythmias      Possible CAUTI: Hines catheter was removed and urinalysis was obtained 6/28 that shows large blood, large LE, >182 WBCs and RBCs so a UTI is possible, but given the large blood it does make the pyuria difficult to interpret.  Urine culture growing  K Pseudomonas.  -Changed IV meropenem back to IV cefepime.  On IV antibiotics covering this since 6/28 which were later discontinued     Hyperkalemia, resolved: Potassium bola to 5.5 and persisted in that range for a day or 2.  She received several doses of Lokelma and potassium level has now normalized.  Unclear etiology.  Renal function is normal.  Her PTA losartan has been on hold.     Hypernatremia: Significant rise in sodium now up to 160 with tube feed initiation.  Improved with high-dose free water via NG and D5, but remained at about 150.  Sodium is now normalized after dose of metolazone and Lasix drip.  -BMP tomorrow     Steroid-induced  hyperglycemia: Continue medium sliding scale aspart.  Reasonable glycemic control at this time with most blood sugars in the 150 range.     Lactic acidosis: Likely due to nebulizers and hypoxia.      Anxiety: Holding prior to admission clonazepam.  Continue paroxetine and as needed IV lorazepam.     HTN: Initially had some soft blood pressure, but more recently hypertensive.  -Oral diltiazem as above  -Losartan discontinued with previous hyperkalemia and hypotension     Tobacco dependence: Nicotine replacement.     Chronic anemia: Hemoglobin at baseline of 10-11.    Plan:   Patient extubated  Related to PT OT as tolerated  BMP and CBC in a.m.      Diet: NPO for Medical/Clinical Reasons Except for: Meds  Adult Formula Drip Feeding: Continuous Promote; Nasogastric tube; Goal Rate: 65 ml/hr x 22 hrs (hold 1 hr before and after levothyroxine); mL/hr; Run 35 ml/hr x 4 hrs. If tolerated, increase to goal rate of 65 ml/hr; Do not advance tube feeding ra...    DVT Prophylaxis: DOAC  Hines Catheter: PRESENT, indication: Deep Sedation/Paralysis, Strict 1-2 Hour I&O  Code Status: Full Code               Emerson Hart MD  Hospitalist Service  [facility]    ______________________________________________________________________    Interval History     Symptoms   Patient was extubated around 11 AM    Review of Systems:   Patient was intubated earlier    Data reviewed today: I reviewed all medications, new labs and imaging results over the last 24 hours.     Physical Exam   Vital Signs: Temp: 98  F (36.7  C) Temp src: Temporal BP: 139/81 Pulse: 90   Resp: (!) 31 SpO2: 100 % O2 Device: BiPAP/CPAP Oxygen Delivery: 50 LPM  Weight: 150 lbs 2.13 oz      GENERAL: Patient is not in acute distress  HEENT: EOM+,Conjunctiva is clear   NECK:  no Jugular Venous distention  HEART: S1 S2 regular Rate and Rhythm, there is  no murmur,   LUNGS: Respirations are  not laboured, Lungs decreased breath sounds in lung bases to  auscultation without Crepitations or Wheezing   ABDOMEN: Soft , there is no tenderness ,Bowel Sounds are  Positive   LOWER LIMBS: no  Pedal Edema  Bilaterally   CNS:  Alert, patient is pleasantly confused moving all the Four Limbs     Data   Recent Labs   Lab 07/06/23  1608 07/06/23  1428 07/06/23  1152 07/06/23  1004 07/06/23  0804 07/06/23  0453 07/05/23  0814 07/05/23  0507 07/04/23  0804 07/04/23  0530 07/03/23  0413 07/03/23  0200 06/30/23  1612 06/30/23  1327   WBC  --   --   --   --   --  10.6  --  11.3*  --  16.2*  --  13.3*   < >  --    HGB  --   --   --   --   --  8.1*  --  8.7*  --  10.0*  --  7.6*   < >  --    MCV  --   --   --   --   --  100  --  102*  --  103*  --  111*   < >  --    PLT  --   --   --   --   --  151  --  137*  --  140*  --  91*   < >  --    NA  --   --   --   --   --  135*  --  136  --  138   < > 147*   < > 151*   POTASSIUM  --  4.1  --  3.4  --  3.4  --  3.5  --  3.9   < > 3.5   < > 4.6   CHLORIDE  --   --   --   --   --  93*  --  87*  --  80*   < > 97*   < > 102   CO2  --   --   --   --   --  36*  --  45*  --  >50*   < > 49*   < > 44*   BUN  --   --   --   --   --  32.3*  --  33.7*  --  33.9*   < > 30.5*   < > 38.7*   CR  --   --   --   --   --  0.50*  --  0.49*  --  0.52   < > 0.41*   < > 0.52   ANIONGAP  --   --   --   --   --  6*  --  4*  --   --   --  1*   < > 5*   EDIN  --   --   --   --   --  8.5*  --  8.8  --  8.9   < > 7.4*   < > 8.6   *  --  142*  --  147* 158*   < > 144*   < > 138*   < > 124*   < > 203*   ALBUMIN  --   --   --   --   --   --   --   --   --   --   --   --   --  3.3*   PROTTOTAL  --   --   --   --   --   --   --   --   --   --   --   --   --  5.7*   BILITOTAL  --   --   --   --   --   --   --   --   --   --   --   --   --  0.3   ALKPHOS  --   --   --   --   --   --   --   --   --   --   --   --   --  42   ALT  --   --   --   --   --   --   --   --   --   --   --   --   --  53*   AST  --   --   --   --   --   --   --   --   --   --   --   --   --  43    <  > = values in this interval not displayed.         No results found for this or any previous visit (from the past 24 hour(s)).

## 2023-07-06 NOTE — PROGRESS NOTES
CLINICAL NUTRITION SERVICES - REASSESSMENT NOTE    Recommendations Ordered by Registered Dietitian (RD):   Continue current EN regimen   Malnutrition:   % Intake: Decreased intake does not meet criteria--meeting needs via enteral nutrition + modulars  % Weight Loss: None noted--interpretation of weights confounded by fluid status  Subcutaneous Fat Loss: None observed  Muscle Loss: None observed- difficult to assess with edema  Fluid Accumulation/Edema: 2+ Mild  Malnutrition Diagnosis: Patient does not meet two of the established criteria necessary for diagnosing malnutrition     EVALUATION OF PROGRESS TOWARD GOALS   Diet:  NPO    Nutrition Support:    Type of Feeding Tube: NJ  Enteral Regimen: Promote @ goal of 65ml/hr x 22 hrs (1430 ml/day)   provides: 1430 kcals, 89 g PRO, 1201 ml free H20, 186 g CHO, and 0 g fiber daily.   Modulars: Prosource TF20 x 1 pkts/day = 80 kcal and 20g pro  Free Water Flush: 60 mL q 4 hours - MD managing    Total energy/protein provisions = 1510 kcal (23 kcal/kg)/109 g protein (1.7 g/kg) per DW 64.5 kg     Propofol previously at 11.9 mL/hr - providing an additional 314 kcal. At this time pt was receiving 1744 kcal (slightly overfed at this time, now propofol d/c'd)    6/23-7/3 - Vital HP     Intake/Tolerance:    TF reached goal 7/3, has been tolerating   7/5: BM x2  Per MD in rounds: Propofol d/c'd 7/6 and transitioning to Precedex  Current EN regimen meets 100% of nutritional needs - continue    ASSESSED NUTRITION NEEDS:  Dose weight: 64.5 kg  Estimated Energy Needs: 0153-2071 kcals/day (20-25 kcal/kg)  Justification: Vented  Estimated Protein Needs:  grams protein/day (1.2 - 2 grams of pro/kg)  Justification: Hypercatabolism with critical illness  Estimated Fluid Needs: 1925 mL/day (30 mL/kg)   Justification: Maintenance    NEW FINDINGS:   Labs: Na 135 (L), BUN 32.3 (H), Cr 0.50 (L), phos 1.4 (L) - replaced per RN  Meds: high sliding scale insulin, levothyroxine,  methylprednisolone, liquid MVI/M, lasix stopped 7/4, propofol stopped 7/6  - On vent  - Potential extubation today 7/6 or tomorrow    Previous Goals:   Total avg nutritional intake to meet a minimum of 20 kcal/kg and 1.2 g PRO/kg daily (per dosing wt 64.5 kg).  Evaluation: Met    Previous Nutrition Diagnosis:   Inadequate oral intake related to respiratory status necessitating NPO status as evidenced by reliance on enteral nutrition to meet needs    Evaluation: No change    CURRENT NUTRITION DIAGNOSIS  Excessive energy intake related to high total feedings as evidenced by EN + propofol + modular providing 1744 kcal (108% of energy needs)    INTERVENTIONS  Recommendations / Nutrition Prescription  Continue current EN regimen    Implementation  EN Composition and EN Schedule - continue  Collaboration with other providers - Discussed patient/EN regimen in rounds    Goals  Total avg nutritional intake to meet a minimum of 20 kcal/kg and 1.2 g PRO/kg daily (per dosing wt 64.5 kg).    MONITORING AND EVALUATION:  Progress towards goals will be monitored and evaluated per protocol and Practice Guidelines    Joan Colunga  Clinical Dietitian - [facility]

## 2023-07-06 NOTE — PROGRESS NOTES
Inpatient Cardiology Consultation Progress Note:    Lara Melendez MRN#: [id number]   YOB: 1965 Age: 57 year old     Date of Admission: 6/20/2023  Consult indication: atrial fibrillation          Assessment and Plan:     # Paroxysmal/persistent atrial fibrillation in setting of acute on chronic hypoxic hypercarbic respiratory failure requiring intubation and mechanical ventilation, underlying severe COPD on supple O2, now with pneumothorax s/p chest tube placement, possible pneumonia. Required DCCV x2 with subsequent intermittent atrial fibrillation with RVR, now back in sinus rhythm    TTE 3/2023 LVEF >70%, normal RV function, no significant valvular abnormalities    - continue diltiazem 90mg q6h with hold parameters  - continue amiodarone 400mg BID for 9 days (total) to finish the load then 200mg daily  - continue apixaban  - furosemide was discontinued, BUN rising, will need close monitoring of volume status  - Cardiac telemetry.  Monitor electrolytes daily, maintain potassium greater than 4, magnesium greater than 2  - Cardiology will follow     Thank you for allowing our team to participate in the care of Lara Melendez.  Please do not hesitate to page me with any questions or concerns.     Alexis Hough MD, [facility]  Cardiology  July 6, 2023    Voice recognition software utilized.     Moderate complexity          Interval Events:     -Patient extubated, denies any chest pain         Medications reviewed:     Current medications:  Current Facility-Administered Medications Ordered in Epic   Medication Dose Route Frequency Last Rate Last Admin     acetaminophen (TYLENOL) solution 650 mg  650 mg Per Feeding Tube Q4H PRN   650 mg at 07/02/23 1349    Or     acetaminophen (TYLENOL) Suppository 650 mg  650 mg Rectal Q4H PRN         [START ON 7/12/2023] amiodarone (PACERONE) tablet 200 mg  200 mg Oral or Feeding Tube Daily         amiodarone (PACERONE) tablet 400 mg  400 mg Oral or Feeding  Tube BID         apixaban ANTICOAGULANT (ELIQUIS) tablet 5 mg  5 mg Per Feeding Tube BID   5 mg at 07/06/23 0801     atorvastatin (LIPITOR) tablet 20 mg  20 mg Per Feeding Tube Daily   20 mg at 07/06/23 0801     ceFEPIme (MAXIPIME) 2 g vial to attach to  ml bag for ADULTS or 50 ml bag for PEDS  2 g Intravenous Q8H   2 g at 07/06/23 1045     cyclobenzaprine (FLEXERIL) tablet 5 mg  5 mg Per Feeding Tube Q8H PRN         dexmedetomidine (PRECEDEX) 4 mcg/mL in NS infusion  0.1-1.2 mcg/kg/hr Intravenous Continuous 5.1 mL/hr at 07/06/23 1445 0.3 mcg/kg/hr at 07/06/23 1445     glucose gel 15-30 g  15-30 g Oral Q15 Min PRN        Or     dextrose 50 % injection 25-50 mL  25-50 mL Intravenous Q15 Min PRN        Or     glucagon injection 1 mg  1 mg Subcutaneous Q15 Min PRN         diltiazem (CARDIZEM) tablet 90 mg  90 mg Oral or Feeding Tube Q6H ELZBIETA   90 mg at 07/06/23 1704     sennosides (SENOKOT) syrup 5-10 mL  5-10 mL Per Feeding Tube BID PRN   10 mL at 06/28/23 0554    And     docusate (COLACE) 50 MG/5ML liquid  mg   mg Per Feeding Tube BID PRN   100 mg at 06/26/23 0820     fentaNYL (SUBLIMAZE) 50 mcg/mL bolus from pump  50 mcg Intravenous Q1H PRN   50 mcg at 07/06/23 0758     flumazenil (ROMAZICON) injection 0.2 mg  0.2 mg Intravenous q1 min prn         hydrALAZINE (APRESOLINE) injection 10 mg  10 mg Intravenous Q6H PRN   10 mg at 06/27/23 0959     hydrOXYzine (ATARAX) syrup 25 mg  25 mg Per Feeding Tube Q6H PRN        Or     hydrOXYzine (ATARAX) syrup 50 mg  50 mg Per Feeding Tube Q6H PRN         insulin aspart (NovoLOG) injection (RAPID ACTING)  1-12 Units Subcutaneous Q4H   1 Units at 07/06/23 1621     ipratropium (ATROVENT) 0.02 % neb solution 0.5 mg  0.5 mg Nebulization 4x daily   0.5 mg at 07/06/23 1537     lactated ringers infusion   Intravenous Continuous   Stopped at 06/28/23 0011     levalbuterol (XOPENEX) neb solution 0.63 mg  0.63 mg Nebulization 4x Daily   0.63 mg at 07/06/23 1537      levothyroxine (SYNTHROID/LEVOTHROID) tablet 112 mcg  112 mcg Per Feeding Tube Daily   112 mcg at 07/06/23 0800     midazolam (VERSED) injection 2 mg  2 mg Intravenous Q1H PRN   2 mg at 07/02/23 0942     multivitamins w/minerals liquid 15 mL  15 mL Per Feeding Tube Daily   15 mL at 07/06/23 0800     naloxone (NARCAN) injection 0.2 mg  0.2 mg Intravenous Q2 Min PRN        Or     naloxone (NARCAN) injection 0.4 mg  0.4 mg Intravenous Q2 Min PRN        Or     naloxone (NARCAN) injection 0.2 mg  0.2 mg Intramuscular Q2 Min PRN        Or     naloxone (NARCAN) injection 0.4 mg  0.4 mg Intramuscular Q2 Min PRN         nicotine (NICODERM CQ) 14 MG/24HR 24 hr patch 1 patch  1 patch Transdermal Daily   1 patch at 07/06/23 0802     nicotine Patch in Place   Transdermal Q8H         ondansetron (ZOFRAN ODT) ODT tab 4 mg  4 mg Oral Q6H PRN        Or     ondansetron (ZOFRAN) injection 4 mg  4 mg Intravenous Q6H PRN         oxyCODONE (ROXICODONE) solution 5 mg  5 mg Oral or Feeding Tube Q4H PRN         pantoprazole (PROTONIX) 2 mg/mL suspension 40 mg  40 mg Per Feeding Tube Daily   40 mg at 07/06/23 0801     PARoxetine (PAXIL) suspension 20 mg  20 mg Per Feeding Tube Daily   20 mg at 07/06/23 0801     Patient is already receiving anticoagulation with heparin, enoxaparin (LOVENOX), warfarin (COUMADIN)  or other anticoagulant medication   Does not apply Continuous PRN         polyethylene glycol (MIRALAX) Packet 17 g  17 g Oral or Feeding Tube Daily PRN         potassium & sodium phosphates (NEUTRA-PHOS) Packet 1 packet  1 packet Oral or Feeding Tube Q4H   1 packet at 07/06/23 1704     prochlorperazine (COMPAZINE) injection 10 mg  10 mg Intravenous Q6H PRN        Or     prochlorperazine (COMPAZINE) tablet 10 mg  10 mg Oral or Feeding Tube Q6H PRN        Or     prochlorperazine (COMPAZINE) suppository 25 mg  25 mg Rectal Q12H PRN         protein modular (PROSOURCE TF20) packet 1 packet  1 packet Per Feeding Tube Daily   1 packet at  "07/06/23 0806     sodium chloride (PF) 0.9% PF flush 10-20 mL  10-20 mL Intracatheter q1 min prn         sodium chloride (PF) 0.9% PF flush 10-40 mL  10-40 mL Intracatheter Once PRN         sodium chloride (PF) 0.9% PF flush 10-40 mL  10-40 mL Intracatheter Q8H   10 mL at 07/05/23 2346     sodium chloride (PF) 0.9% PF flush 10-40 mL  10-40 mL Intracatheter Q1H PRN         sodium chloride (PF) 0.9% PF flush 3 mL  3 mL Intracatheter Q8H   3 mL at 07/06/23 0809     sodium chloride (PF) 0.9% PF flush 3 mL  3 mL Intracatheter q1 min prn         No current [facility]-ordered outpatient medications on file.             Physical Exam:   Vital signs:  Temp: 98  F (36.7  C) Temp src: Temporal BP: 139/81 Pulse: 90   Resp: (!) 31 SpO2: 100 % O2 Device: BiPAP/CPAP Oxygen Delivery: 50 LPM Height: 160 cm (5' 3\") Weight: 68.1 kg (150 lb 2.1 oz)  Estimated body mass index is 26.59 kg/m  as calculated from the following:    Height as of this encounter: 1.6 m (5' 3\").    Weight as of this encounter: 68.1 kg (150 lb 2.1 oz).        Intake/Output Summary (Last 24 hours) at 7/5/2023 1706  Last data filed at 7/5/2023 1600  Gross per 24 hour   Intake 2608.8 ml   Output 5490 ml   Net -2881.2 ml       150 lbs 2.13 oz  Body mass index is 26.59 kg/m .   Body surface area is 1.74 meters squared.    General/Constitutional: appears stated age, high flow nasal cannula in place  Head: normocephalic, atraumatic  Respiratory: diminished breath sounds bilaterally  Cardiovascular: regular rate, regular rhythm, no obvious murmurs, no lower extremity edema  Gastrointestinal: non-rigid          Selected laboratory tests:   Laboratory test results personally reviewed:   [facility]  Recent Labs   Lab 07/06/23  1608 07/06/23  1428 07/06/23  1152 07/06/23  1004 07/06/23  0804 07/06/23  0453 07/05/23  1615 07/05/23  1319 07/05/23  0814 07/05/23  0507 07/04/23  0804 07/04/23  0530 07/03/23  1723 07/03/23  1210 07/03/23  0413 07/03/23  0200 07/02/23  1635 07/02/23  1350 " 06/30/23  1612 06/30/23  1327   NA  --   --   --   --   --  135*  --   --   --  136  --  138  --  141  --  147*  --  149*   < > 151*   POTASSIUM  --  4.1  --  3.4  --  3.4  --   --   --  3.5  --  3.9   < > 3.8  --  3.5  --  4.6   < > 4.6   CHLORIDE  --   --   --   --   --  93*  --   --   --  87*  --  80*  --  85*  --  97*  --  98   < > 102   CO2  --   --   --   --   --  36*  --   --   --  45*  --  >50*  --  >50*  --  49*  --  48*   < > 44*   ANIONGAP  --   --   --   --   --  6*  --   --   --  4*  --   --   --   --   --  1*  --  3*   < > 5*   *  --  142*  --  147* 158*   < >  --    < > 144*   < > 138*   < > 200*   < > 124*   < > 224*   < > 203*   BUN  --   --   --   --   --  32.3*  --   --   --  33.7*  --  33.9*  --  31.4*  --  30.5*  --  37.3*   < > 38.7*   CR  --   --   --   --   --  0.50*  --   --   --  0.49*  --  0.52  --  0.43*  --  0.41*  --  0.49*   < > 0.52   GFRESTIMATED  --   --   --   --   --  >90  --   --   --  >90  --  >90  --  >90  --  >90  --  >90   < > >90   EDIN  --   --   --   --   --  8.5*  --   --   --  8.8  --  8.9  --  8.1*  --  7.4*  --  8.3*   < > 8.6   MAG  --   --   --   --   --  1.8  --   --   --  1.6*  --  2.1  --  2.1  --  1.8  --  1.9   < > 2.4*   PHOS  --  2.3*  --   --   --  1.4*  --  2.7  --  1.8*  --  2.7   < >  --   --  2.2*   < >  --    < >  --    PROTTOTAL  --   --   --   --   --   --   --   --   --   --   --   --   --   --   --   --   --   --   --  5.7*   ALBUMIN  --   --   --   --   --   --   --   --   --   --   --   --   --   --   --   --   --   --   --  3.3*   BILITOTAL  --   --   --   --   --   --   --   --   --   --   --   --   --   --   --   --   --   --   --  0.3   ALKPHOS  --   --   --   --   --   --   --   --   --   --   --   --   --   --   --   --   --   --   --  42   AST  --   --   --   --   --   --   --   --   --   --   --   --   --   --   --   --   --   --   --  43   ALT  --   --   --   --   --   --   --   --   --   --   --   --   --   --   --   --   --   --    --  53*    < > = values in this interval not displayed.     CBC  Recent Labs   Lab 07/06/23  0453 07/05/23  0507 07/04/23  0530 07/03/23  0200   WBC 10.6 11.3* 16.2* 13.3*   RBC 2.43* 2.62* 3.00* 2.27*   HGB 8.1* 8.7* 10.0* 7.6*   HCT 24.4* 26.6* 30.9* 25.1*    102* 103* 111*   MCH 33.3* 33.2* 33.3* 33.5*   MCHC 33.2 32.7 32.4 30.3*   RDW 14.3 13.7 13.8 14.0    137* 140* 91*     INRNo lab results found in last 7 days.  No results found for: TROPI, TROPONIN, TROPR, TROPN  Recent Labs   Lab Test 06/30/23  0505 06/29/23  1638   TRIG 97 76     Lab Results   Component Value Date    A1C 4.9 03/26/2023    A1C 4.4 12/01/2022     TSH   Date Value Ref Range Status   07/02/2023 0.31 0.30 - 4.20 uIU/mL Final   01/29/2006 4.68 0.4 - 5.0 mU/L Final

## 2023-07-06 NOTE — PROGRESS NOTES
A BiPAP of  10/5 @ 50% was applied to the pt via the mask for an increase in WOB and/or SOB.  The bridge of the nose looks good and remains intact. Pt is tolerating it well. Will continue to monitor and assess the pt's current respiratory status and needs.

## 2023-07-07 ENCOUNTER — APPOINTMENT (OUTPATIENT)
Dept: GENERAL RADIOLOGY | Facility: CLINIC | Age: 58
End: 2023-07-07
Attending: SURGERY
Payer: COMMERCIAL

## 2023-07-07 LAB
ANION GAP SERPL CALCULATED.3IONS-SCNC: 4 MMOL/L (ref 7–15)
APPEARANCE FLD: ABNORMAL
BASE EXCESS BLDV CALC-SCNC: 10.4 MMOL/L (ref -7.7–1.9)
BASE EXCESS BLDV CALC-SCNC: 9.3 MMOL/L (ref -7.7–1.9)
BUN SERPL-MCNC: 28 MG/DL (ref 8–23)
CALCIUM SERPL-MCNC: 8.1 MG/DL (ref 8.6–10)
CELL COUNT BODY FLUID SOURCE: ABNORMAL
CHLORIDE SERPL-SCNC: 99 MMOL/L (ref 98–107)
COLOR FLD: ABNORMAL
CREAT SERPL-MCNC: 0.44 MG/DL (ref 0.51–0.95)
DEPRECATED HCO3 PLAS-SCNC: 33 MMOL/L (ref 22–29)
ERYTHROCYTE [DISTWIDTH] IN BLOOD BY AUTOMATED COUNT: 15.7 % (ref 10–15)
GFR SERPL CREATININE-BSD FRML MDRD: >90 ML/MIN/1.73M2
GLUCOSE BLDC GLUCOMTR-MCNC: 116 MG/DL (ref 70–99)
GLUCOSE BLDC GLUCOMTR-MCNC: 135 MG/DL (ref 70–99)
GLUCOSE BLDC GLUCOMTR-MCNC: 146 MG/DL (ref 70–99)
GLUCOSE BLDC GLUCOMTR-MCNC: 146 MG/DL (ref 70–99)
GLUCOSE BLDC GLUCOMTR-MCNC: 150 MG/DL (ref 70–99)
GLUCOSE BLDC GLUCOMTR-MCNC: 150 MG/DL (ref 70–99)
GLUCOSE BLDC GLUCOMTR-MCNC: 88 MG/DL (ref 70–99)
GLUCOSE SERPL-MCNC: 137 MG/DL (ref 70–99)
GRAM STAIN RESULT: NORMAL
GRAM STAIN RESULT: NORMAL
HCO3 BLDV-SCNC: 36 MMOL/L (ref 21–28)
HCO3 BLDV-SCNC: 36 MMOL/L (ref 21–28)
HCT VFR BLD AUTO: 22.9 % (ref 35–47)
HGB BLD-MCNC: 7.6 G/DL (ref 11.7–15.7)
KOH PREPARATION: NORMAL
KOH PREPARATION: NORMAL
LYMPHOCYTES NFR FLD MANUAL: 3 %
MAGNESIUM SERPL-MCNC: 1.8 MG/DL (ref 1.7–2.3)
MCH RBC QN AUTO: 34.1 PG (ref 26.5–33)
MCHC RBC AUTO-ENTMCNC: 33.2 G/DL (ref 31.5–36.5)
MCV RBC AUTO: 103 FL (ref 78–100)
MONOS+MACROS NFR FLD MANUAL: 9 %
NEUTS BAND NFR FLD MANUAL: 88 %
O2/TOTAL GAS SETTING VFR VENT: 55 %
O2/TOTAL GAS SETTING VFR VENT: 65 %
PCO2 BLDV: 55 MM HG (ref 40–50)
PCO2 BLDV: 65 MM HG (ref 40–50)
PH BLDV: 7.35 [PH] (ref 7.32–7.43)
PH BLDV: 7.42 [PH] (ref 7.32–7.43)
PHOSPHATE SERPL-MCNC: 2.7 MG/DL (ref 2.5–4.5)
PLATELET # BLD AUTO: 154 10E3/UL (ref 150–450)
PO2 BLDV: 38 MM HG (ref 25–47)
PO2 BLDV: 39 MM HG (ref 25–47)
POTASSIUM SERPL-SCNC: 3.6 MMOL/L (ref 3.4–5.3)
RBC # BLD AUTO: 2.23 10E6/UL (ref 3.8–5.2)
SODIUM SERPL-SCNC: 136 MMOL/L (ref 136–145)
WBC # BLD AUTO: 8.3 10E3/UL (ref 4–11)

## 2023-07-07 PROCEDURE — 250N000009 HC RX 250: Performed by: INTERNAL MEDICINE

## 2023-07-07 PROCEDURE — 250N000013 HC RX MED GY IP 250 OP 250 PS 637: Performed by: HOSPITALIST

## 2023-07-07 PROCEDURE — 82803 BLOOD GASES ANY COMBINATION: CPT | Performed by: SURGERY

## 2023-07-07 PROCEDURE — 999N000065 XR CHEST PORT 1 VIEW

## 2023-07-07 PROCEDURE — 31600 PLANNED TRACHEOSTOMY: CPT | Performed by: SURGERY

## 2023-07-07 PROCEDURE — 87081 CULTURE SCREEN ONLY: CPT | Performed by: SURGERY

## 2023-07-07 PROCEDURE — 85027 COMPLETE CBC AUTOMATED: CPT | Performed by: INTERNAL MEDICINE

## 2023-07-07 PROCEDURE — 250N000011 HC RX IP 250 OP 636: Performed by: SURGERY

## 2023-07-07 PROCEDURE — 31624 DX BRONCHOSCOPE/LAVAGE: CPT | Performed by: INTERNAL MEDICINE

## 2023-07-07 PROCEDURE — 250N000009 HC RX 250: Performed by: HOSPITALIST

## 2023-07-07 PROCEDURE — 200N000001 HC R&B ICU

## 2023-07-07 PROCEDURE — 89051 BODY FLUID CELL COUNT: CPT | Performed by: SURGERY

## 2023-07-07 PROCEDURE — 87486 CHLMYD PNEUM DNA AMP PROBE: CPT | Performed by: SURGERY

## 2023-07-07 PROCEDURE — 999N000157 HC STATISTIC RCP TIME EA 10 MIN

## 2023-07-07 PROCEDURE — 99231 SBSQ HOSP IP/OBS SF/LOW 25: CPT | Performed by: INTERNAL MEDICINE

## 2023-07-07 PROCEDURE — 87102 FUNGUS ISOLATION CULTURE: CPT | Performed by: SURGERY

## 2023-07-07 PROCEDURE — 0B113F4 BYPASS TRACHEA TO CUTANEOUS WITH TRACHEOSTOMY DEVICE, PERCUTANEOUS APPROACH: ICD-10-PCS | Performed by: SURGERY

## 2023-07-07 PROCEDURE — 87071 CULTURE AEROBIC QUANT OTHER: CPT | Performed by: SURGERY

## 2023-07-07 PROCEDURE — 83735 ASSAY OF MAGNESIUM: CPT | Performed by: INTERNAL MEDICINE

## 2023-07-07 PROCEDURE — 94003 VENT MGMT INPAT SUBQ DAY: CPT

## 2023-07-07 PROCEDURE — 99233 SBSQ HOSP IP/OBS HIGH 50: CPT | Mod: 25 | Performed by: SURGERY

## 2023-07-07 PROCEDURE — 258N000003 HC RX IP 258 OP 636: Performed by: HOSPITALIST

## 2023-07-07 PROCEDURE — 94640 AIRWAY INHALATION TREATMENT: CPT

## 2023-07-07 PROCEDURE — 94640 AIRWAY INHALATION TREATMENT: CPT | Mod: 76

## 2023-07-07 PROCEDURE — 250N000009 HC RX 250: Performed by: SURGERY

## 2023-07-07 PROCEDURE — 82310 ASSAY OF CALCIUM: CPT | Performed by: INTERNAL MEDICINE

## 2023-07-07 PROCEDURE — 87116 MYCOBACTERIA CULTURE: CPT | Performed by: SURGERY

## 2023-07-07 PROCEDURE — 87205 SMEAR GRAM STAIN: CPT | Performed by: SURGERY

## 2023-07-07 PROCEDURE — 250N000013 HC RX MED GY IP 250 OP 250 PS 637: Performed by: INTERNAL MEDICINE

## 2023-07-07 PROCEDURE — 250N000011 HC RX IP 250 OP 636: Mod: JZ | Performed by: HOSPITALIST

## 2023-07-07 PROCEDURE — 0B9H8ZX DRAINAGE OF LUNG LINGULA, VIA NATURAL OR ARTIFICIAL OPENING ENDOSCOPIC, DIAGNOSTIC: ICD-10-PCS | Performed by: INTERNAL MEDICINE

## 2023-07-07 PROCEDURE — 87210 SMEAR WET MOUNT SALINE/INK: CPT | Performed by: SURGERY

## 2023-07-07 PROCEDURE — 84100 ASSAY OF PHOSPHORUS: CPT | Performed by: INTERNAL MEDICINE

## 2023-07-07 PROCEDURE — C9113 INJ PANTOPRAZOLE SODIUM, VIA: HCPCS | Mod: JZ | Performed by: SURGERY

## 2023-07-07 PROCEDURE — 250N000013 HC RX MED GY IP 250 OP 250 PS 637: Performed by: SURGERY

## 2023-07-07 PROCEDURE — 87106 FUNGI IDENTIFICATION YEAST: CPT | Performed by: SURGERY

## 2023-07-07 RX ORDER — POTASSIUM CHLORIDE 20MEQ/15ML
20 LIQUID (ML) ORAL ONCE
Status: COMPLETED | OUTPATIENT
Start: 2023-07-07 | End: 2023-07-07

## 2023-07-07 RX ORDER — MAGNESIUM SULFATE HEPTAHYDRATE 40 MG/ML
2 INJECTION, SOLUTION INTRAVENOUS ONCE
Status: COMPLETED | OUTPATIENT
Start: 2023-07-07 | End: 2023-07-07

## 2023-07-07 RX ORDER — NOREPINEPHRINE BITARTRATE 0.02 MG/ML
.01-.6 INJECTION, SOLUTION INTRAVENOUS CONTINUOUS
Status: DISCONTINUED | OUTPATIENT
Start: 2023-07-07 | End: 2023-07-09

## 2023-07-07 RX ORDER — VECURONIUM BROMIDE 1 MG/ML
10 INJECTION, POWDER, LYOPHILIZED, FOR SOLUTION INTRAVENOUS ONCE
Status: COMPLETED | OUTPATIENT
Start: 2023-07-07 | End: 2023-07-07

## 2023-07-07 RX ORDER — LIDOCAINE HYDROCHLORIDE AND EPINEPHRINE 10; 10 MG/ML; UG/ML
20 INJECTION, SOLUTION INFILTRATION; PERINEURAL ONCE
Status: COMPLETED | OUTPATIENT
Start: 2023-07-07 | End: 2023-07-07

## 2023-07-07 RX ORDER — SODIUM CHLORIDE FOR INHALATION 3 %
4 VIAL, NEBULIZER (ML) INHALATION
Status: DISCONTINUED | OUTPATIENT
Start: 2023-07-07 | End: 2023-07-10

## 2023-07-07 RX ADMIN — PROPOFOL 35 MCG/KG/MIN: 10 INJECTION, EMULSION INTRAVENOUS at 12:02

## 2023-07-07 RX ADMIN — IPRATROPIUM BROMIDE 0.5 MG: 0.5 SOLUTION RESPIRATORY (INHALATION) at 15:29

## 2023-07-07 RX ADMIN — LIDOCAINE HYDROCHLORIDE,EPINEPHRINE BITARTRATE 20 ML: 10; .01 INJECTION, SOLUTION INFILTRATION; PERINEURAL at 14:30

## 2023-07-07 RX ADMIN — VECURONIUM BROMIDE 10 MG: 1 INJECTION, POWDER, LYOPHILIZED, FOR SOLUTION INTRAVENOUS at 14:48

## 2023-07-07 RX ADMIN — LEVOTHYROXINE SODIUM 112 MCG: 0.11 TABLET ORAL at 09:09

## 2023-07-07 RX ADMIN — IPRATROPIUM BROMIDE 0.5 MG: 0.5 SOLUTION RESPIRATORY (INHALATION) at 11:09

## 2023-07-07 RX ADMIN — MIDAZOLAM 2 MG: 1 INJECTION INTRAMUSCULAR; INTRAVENOUS at 14:43

## 2023-07-07 RX ADMIN — LEVALBUTEROL HYDROCHLORIDE 0.63 MG: 0.63 SOLUTION RESPIRATORY (INHALATION) at 21:06

## 2023-07-07 RX ADMIN — DEXMEDETOMIDINE HYDROCHLORIDE 0.6 MCG/KG/HR: 400 INJECTION INTRAVENOUS at 00:33

## 2023-07-07 RX ADMIN — MIDAZOLAM 2 MG: 1 INJECTION INTRAMUSCULAR; INTRAVENOUS at 14:49

## 2023-07-07 RX ADMIN — MIDAZOLAM 2 MG: 1 INJECTION INTRAMUSCULAR; INTRAVENOUS at 04:50

## 2023-07-07 RX ADMIN — PROPOFOL 20 MCG/KG/MIN: 10 INJECTION, EMULSION INTRAVENOUS at 17:58

## 2023-07-07 RX ADMIN — PROPOFOL 35 MCG/KG/MIN: 10 INJECTION, EMULSION INTRAVENOUS at 06:32

## 2023-07-07 RX ADMIN — LEVALBUTEROL HYDROCHLORIDE 0.63 MG: 0.63 SOLUTION RESPIRATORY (INHALATION) at 08:27

## 2023-07-07 RX ADMIN — APIXABAN 5 MG: 5 TABLET, FILM COATED ORAL at 09:09

## 2023-07-07 RX ADMIN — PAROXETINE 20 MG: 10 SUSPENSION ORAL at 08:01

## 2023-07-07 RX ADMIN — DEXMEDETOMIDINE HYDROCHLORIDE 0.7 MCG/KG/HR: 400 INJECTION INTRAVENOUS at 08:22

## 2023-07-07 RX ADMIN — PANTOPRAZOLE SODIUM 40 MG: 40 INJECTION, POWDER, FOR SOLUTION INTRAVENOUS at 07:59

## 2023-07-07 RX ADMIN — LEVALBUTEROL HYDROCHLORIDE 0.63 MG: 0.63 SOLUTION RESPIRATORY (INHALATION) at 11:09

## 2023-07-07 RX ADMIN — AMIODARONE HYDROCHLORIDE 400 MG: 200 TABLET ORAL at 22:41

## 2023-07-07 RX ADMIN — Medication 0.03 MCG/KG/MIN: at 14:24

## 2023-07-07 RX ADMIN — NICOTINE 1 PATCH: 14 PATCH, EXTENDED RELEASE TRANSDERMAL at 08:10

## 2023-07-07 RX ADMIN — IPRATROPIUM BROMIDE 0.5 MG: 0.5 SOLUTION RESPIRATORY (INHALATION) at 08:27

## 2023-07-07 RX ADMIN — AMIODARONE HYDROCHLORIDE 400 MG: 200 TABLET ORAL at 08:00

## 2023-07-07 RX ADMIN — DEXMEDETOMIDINE HYDROCHLORIDE 0.7 MCG/KG/HR: 400 INJECTION INTRAVENOUS at 16:21

## 2023-07-07 RX ADMIN — SODIUM PHOSPHATE, MONOBASIC, MONOHYDRATE AND SODIUM PHOSPHATE, DIBASIC, ANHYDROUS 9 MMOL: 142; 276 INJECTION, SOLUTION INTRAVENOUS at 06:45

## 2023-07-07 RX ADMIN — MAGNESIUM SULFATE HEPTAHYDRATE 2 G: 2 INJECTION, SOLUTION INTRAVENOUS at 05:35

## 2023-07-07 RX ADMIN — Medication 50 MCG: at 04:50

## 2023-07-07 RX ADMIN — LEVALBUTEROL HYDROCHLORIDE 0.63 MG: 0.63 SOLUTION RESPIRATORY (INHALATION) at 15:29

## 2023-07-07 RX ADMIN — MAGNESIUM SULFATE HEPTAHYDRATE 2 G: 2 INJECTION, SOLUTION INTRAVENOUS at 11:00

## 2023-07-07 RX ADMIN — Medication 50 MCG/HR: at 03:44

## 2023-07-07 RX ADMIN — ATORVASTATIN CALCIUM 20 MG: 20 TABLET, FILM COATED ORAL at 08:00

## 2023-07-07 RX ADMIN — Medication 50 MCG: at 09:23

## 2023-07-07 RX ADMIN — MIDAZOLAM 2 MG: 1 INJECTION INTRAMUSCULAR; INTRAVENOUS at 14:35

## 2023-07-07 RX ADMIN — Medication 15 ML: at 08:00

## 2023-07-07 RX ADMIN — DEXMEDETOMIDINE HYDROCHLORIDE 0.7 MCG/KG/HR: 400 INJECTION INTRAVENOUS at 23:43

## 2023-07-07 RX ADMIN — POTASSIUM CHLORIDE 20 MEQ: 20 SOLUTION ORAL at 05:35

## 2023-07-07 RX ADMIN — IPRATROPIUM BROMIDE 0.5 MG: 0.5 SOLUTION RESPIRATORY (INHALATION) at 21:06

## 2023-07-07 ASSESSMENT — ACTIVITIES OF DAILY LIVING (ADL)
ADLS_ACUITY_SCORE: 36

## 2023-07-07 NOTE — PROGRESS NOTES
Camera'ed into room per ICU nurse request    Patient's WOB increased over 2 hours despite increased BiPAP support and sedation. On our eval-patient RR >35 with max , Sats 89% and using accessory muscles.     Will re-intubate  Orders placed  CRNA called    Evelin Carranza MD

## 2023-07-07 NOTE — ANESTHESIA PROCEDURE NOTES
Airway       Patient location during procedure: ICU       Procedure Start/Stop Times: 7/6/2023 10:48 PM  Staff -        CRNA: Gerry Croft APRN CRNA       Performed By: CRNA  Consent for Airway        Urgency: elective  Report Obtained from Primary Care Team       History regarding most recent potassium obtained: Yes       History regarding presence/absence of renal failure obtained:Yes       History regarding stroke/CVA obtained:Yes       History regarding presence/absence of NM disorder: YesIndications and Patient Condition       Indications for airway management: respiratory insufficiency       Mallampati: I     Induction type:intravenous       Mask difficulty assessment: 0 - not attempted    Final Airway Details       Final airway type: endotracheal airway       Successful airway: ETT - single  Endotracheal Airway Details        ETT size (mm): 7.0       Cuffed: yes       Successful intubation technique: video laryngoscopy       VL Blade Size: Glidescope 3       Grade View of Cords: 1       Adjucts: stylet       Position: Right       Measured from: lips       Secured at (cm): 22       Bite block used: None    Post intubation assessment        ETT secured, Vent settings by primary/ICU team, Primary/ICU team to review CXR, Sedation to be ordered by primary/ICU team and No apparent complications       Placement verified by: capnometry, equal breath sounds and chest rise        Number of attempts at approach: 1       Number of other approaches attempted: 0       Secured with: commercial tube mishra       Ease of procedure: easy       Dentition: Intact and Unchanged    Medication(s) Administered   propofol (DIPRIVAN) injection 10 mg/mL vial - Intravenous   150 mg - 7/6/2023 10:48:00 PM  succinylcholine (ANECTINE) 20 mg/mL injection - Intravenous   80 mg - 7/6/2023 10:48:00 PM  Medication Administration Time: 7/6/2023 10:48 PM

## 2023-07-07 NOTE — PROCEDURES
SICU BEDSIDE PROCEDURE     SURGEON: Kody Mackey DO     ASSISTANTS: Milla Veliz MD, Gen Clarke MD      SEDATION/PARALYZATION: Versed, propofol, Precedex, fentanyl, vecuronium, lidocaine 1% with epinephrine      ESTIMATED BLOOD LOSS: 5 ml       COMPLICATIONS: None      TRACHEOSTOMY: 8.5 Shiley flexible cuffed      DESCRIPTION OF PROCEDURE: A time out was performed to verify patient and procedure. Signed consent form was seen. The patient was positioned supine with a roll under her neck. The neck was prepped and draped in usual sterile fashion. Sedation and analgesia with versed, fentanyl, propofol, Precedex and vecuronium was given. The site was anesthetized with intradermal lidocaine 1% with epinephrine. A vertical 2 cm incision was made through the dermis in the midline over palpable tracheal rings, 3 cm above the sternal notch. A hemostat was used to bluntly dissect down to pretracheal tissue where transillumination by the bronchoscope was seen.  Under direct visualization with the bronchoscope, a finder needle was passed between the 1st and 2nd tracheal rings into the lumen. The introducer needle was then passed into the same space. A wire was passed through the introducer cannula into the trachea and the cannula was removed. The first dilator was advanced over the wire into the lumen and removed. The second, curved dilator was advanced over the wire and the tracheostomy was serially dialted until the thick black rome was seen in the lumen. The dilator was removed and sheath left in place. The tracheostomy tube was advanced over the wire and sheath and the wire and sheath were removed. The ventilator tubing was moved to the tracheostomy tube, and Bronchoscopy confirmed placement of the tube in the tracheal lumen.  The tracheostomy was secured with 2, prolene sutures on each side and then with tracheostomy ties. A drain sponge was placed under the tracheostomy tube apparatus. Hemostasis was  ensured. The orotracheal tube was removed after confirmed adequate ventilation through the tracheostomy.  Post operative chest x-ray confirmed good position of the tracheostomy with no pneumothorax.

## 2023-07-07 NOTE — PLAN OF CARE
Goal Outcome Evaluation:      Plan of Care Reviewed With: patient, spouse                       ICU End of Shift Summary.  For vital signs and complete assessments, please see documentation flowsheets.      Pertinent assessments:   Neuro: RASS -3 started to be awake.   Cardiac: SR to afib with PAC's. Episode of gonzalez cardia up to 58.   Resp:  FiO2: 50, PEEP: 5, TV: 350, RR: 18. With chest drain.   GI:TF 50 mls and 30ls flushing Q4  : Hines catheter in placed. BM 1x  Skin: With small tear @ left fore arm with mepilex.  Lines: Picc line @ right arm 3 lumen and PIV's @ right fore arm.  Drips: Fentanyl 100, profopol 20, precedex .7. Levophed .05    Major Shift Events:   Trial of weaning failed tachycardic, tachypneic. Proceed to tracheostmy done bedside. Versed 6mgs and Vecoronium of 10mg was given during the procedure.  Started on levophed during the procedure to keep MAP more than 65.  Plan (Upcoming Events): Wean sedations as tolerated.  Discharge/Transfer Needs: TBD     Bedside Shift Report Completed : Y  Bedside Safety Check Completed: Y

## 2023-07-07 NOTE — PROGRESS NOTES
Inpatient Cardiology Consultation Progress Note:    Lara Melendez MRN#: [id number]   YOB: 1965 Age: 57 year old     Date of Admission: 6/20/2023  Consult indication: atrial fibrillation          Assessment and Plan:     # Paroxysmal/persistent atrial fibrillation in setting of acute on chronic hypoxic hypercarbic respiratory failure requiring intubation and mechanical ventilation, underlying severe COPD on supple O2, now with pneumothorax s/p chest tube placement, possible pneumonia. Required DCCV x2 with subsequent intermittent atrial fibrillation with RVR, now back in sinus rhythm with intermittent PACs, short runs of paroxysmal SVT    TTE 3/2023 LVEF >70%, normal RV function, no significant valvular abnormalities    - continue diltiazem 90mg q6h with hold parameters  - continue amiodarone 400mg BID for 9 days (total) to finish the load then 200mg daily  - continue apixaban  - furosemide was discontinued, BUN rising, will need close monitoring of volume status  - Cardiac telemetry.  Monitor electrolytes daily, maintain potassium greater than 4, magnesium greater than 2  - Cardiology will sign off    Thank you for allowing our team to participate in the care of Lara Melendez.  Please do not hesitate to page me with any questions or concerns.     Alexis Hough MD, [facility]  Cardiology  July 7, 2023    Voice recognition software utilized.     Low complexity          Interval Events:     -Patient developed increased work of breathing despite increased BiPAP support, reintubated overnight, now status post percutaneous tracheostomy at bedside         Medications reviewed:     Current medications:  Current Facility-Administered Medications Ordered in Epic   Medication Dose Route Frequency Last Rate Last Admin     acetaminophen (TYLENOL) solution 650 mg  650 mg Per Feeding Tube Q4H PRN   650 mg at 07/02/23 1349    Or     acetaminophen (TYLENOL) Suppository 650 mg  650 mg Rectal Q4H PRN          albuterol (PROVENTIL) neb solution 2.5 mg  2.5 mg Nebulization Q4H PRN         [START ON 7/12/2023] amiodarone (PACERONE) tablet 200 mg  200 mg Oral or Feeding Tube Daily         amiodarone (PACERONE) tablet 400 mg  400 mg Oral or Feeding Tube BID   400 mg at 07/07/23 0800     [Held by provider] apixaban ANTICOAGULANT (ELIQUIS) tablet 5 mg  5 mg Per Feeding Tube BID   5 mg at 07/07/23 0909     atorvastatin (LIPITOR) tablet 20 mg  20 mg Per Feeding Tube Daily   20 mg at 07/07/23 0800     cyclobenzaprine (FLEXERIL) tablet 5 mg  5 mg Per Feeding Tube Q8H PRN         dexmedetomidine (PRECEDEX) 4 mcg/mL in NS infusion  0.1-1.2 mcg/kg/hr Intravenous Continuous 11.9 mL/hr at 07/07/23 1700 0.7 mcg/kg/hr at 07/07/23 1700     glucose gel 15-30 g  15-30 g Oral Q15 Min PRN        Or     dextrose 50 % injection 25-50 mL  25-50 mL Intravenous Q15 Min PRN        Or     glucagon injection 1 mg  1 mg Subcutaneous Q15 Min PRN         diltiazem (CARDIZEM) tablet 90 mg  90 mg Oral or Feeding Tube Q6H ELZBIETA   90 mg at 07/06/23 1704     sennosides (SENOKOT) syrup 5-10 mL  5-10 mL Per Feeding Tube BID PRN   10 mL at 06/28/23 0554    And     docusate (COLACE) 50 MG/5ML liquid  mg   mg Per Feeding Tube BID PRN   100 mg at 06/26/23 0820     fentaNYL (SUBLIMAZE) 50 mcg/mL bolus from pump  50 mcg Intravenous Q1H PRN   50 mcg at 07/07/23 0923     fentaNYL (SUBLIMAZE) infusion   mcg/hr Intravenous Continuous 2 mL/hr at 07/07/23 1600 100 mcg/hr at 07/07/23 1600     flumazenil (ROMAZICON) injection 0.2 mg  0.2 mg Intravenous q1 min prn         hydrALAZINE (APRESOLINE) injection 10 mg  10 mg Intravenous Q6H PRN   10 mg at 06/27/23 0959     hydrOXYzine (ATARAX) syrup 25 mg  25 mg Per Feeding Tube Q6H PRN        Or     hydrOXYzine (ATARAX) syrup 50 mg  50 mg Per Feeding Tube Q6H PRN         insulin aspart (NovoLOG) injection (RAPID ACTING)  1-12 Units Subcutaneous Q4H   1 Units at 07/07/23 1624     ipratropium (ATROVENT) 0.02 %  neb solution 0.5 mg  0.5 mg Nebulization 4x daily   0.5 mg at 07/07/23 1529     lactated ringers infusion   Intravenous Continuous   Stopped at 06/28/23 0011     levalbuterol (XOPENEX) neb solution 0.63 mg  0.63 mg Nebulization 4x Daily   0.63 mg at 07/07/23 1529     levothyroxine (SYNTHROID/LEVOTHROID) tablet 112 mcg  112 mcg Per Feeding Tube Daily   112 mcg at 07/07/23 0909     propofol (DIPRIVAN) infusion  5-75 mcg/kg/min Intravenous Continuous 8.2 mL/hr at 07/07/23 1706 20 mcg/kg/min at 07/07/23 1706    And     propofol (DIPRIVAN) bolus from infusion pump 10 mg  10 mg Intravenous Q15 Min PRN        And     Medication Instruction   Does not apply Continuous PRN         midazolam (VERSED) injection 2 mg  2 mg Intravenous Q5 Min PRN   2 mg at 07/07/23 1449     midazolam (VERSED) injection 2 mg  2 mg Intravenous Q1H PRN   2 mg at 07/07/23 0450     multivitamins w/minerals liquid 15 mL  15 mL Per Feeding Tube Daily   15 mL at 07/07/23 0800     naloxone (NARCAN) injection 0.2 mg  0.2 mg Intravenous Q2 Min PRN        Or     naloxone (NARCAN) injection 0.4 mg  0.4 mg Intravenous Q2 Min PRN        Or     naloxone (NARCAN) injection 0.2 mg  0.2 mg Intramuscular Q2 Min PRN        Or     naloxone (NARCAN) injection 0.4 mg  0.4 mg Intramuscular Q2 Min PRN         nicotine (NICODERM CQ) 14 MG/24HR 24 hr patch 1 patch  1 patch Transdermal Daily   1 patch at 07/07/23 0810     nicotine Patch in Place   Transdermal Q8H         norepinephrine (LEVOPHED) 4 mg in  mL infusion PREMIX  0.01-0.6 mcg/kg/min Intravenous Continuous 12.6 mL/hr at 07/07/23 1706 0.05 mcg/kg/min at 07/07/23 1706     ondansetron (ZOFRAN ODT) ODT tab 4 mg  4 mg Oral Q6H PRN        Or     ondansetron (ZOFRAN) injection 4 mg  4 mg Intravenous Q6H PRN         oxyCODONE (ROXICODONE) solution 5 mg  5 mg Oral or Feeding Tube Q4H PRN         pantoprazole (PROTONIX) 2 mg/mL suspension 40 mg  40 mg Per Feeding Tube QAM AC        Or     pantoprazole (PROTONIX) IV  "push injection 40 mg  40 mg Intravenous QAM AC   40 mg at 07/07/23 0759     PARoxetine (PAXIL) suspension 20 mg  20 mg Per Feeding Tube Daily   20 mg at 07/07/23 0801     Patient is already receiving anticoagulation with heparin, enoxaparin (LOVENOX), warfarin (COUMADIN)  or other anticoagulant medication   Does not apply Continuous PRN         polyethylene glycol (MIRALAX) Packet 17 g  17 g Oral or Feeding Tube Daily PRN         prochlorperazine (COMPAZINE) injection 10 mg  10 mg Intravenous Q6H PRN        Or     prochlorperazine (COMPAZINE) tablet 10 mg  10 mg Oral or Feeding Tube Q6H PRN        Or     prochlorperazine (COMPAZINE) suppository 25 mg  25 mg Rectal Q12H PRN         protein modular (PROSOURCE TF20) packet 1 packet  1 packet Per Feeding Tube BID         sodium chloride (NEBUSAL) 3 % neb solution 4 mL  4 mL Nebulization Q3H PRN         sodium chloride (PF) 0.9% PF flush 10-20 mL  10-20 mL Intracatheter q1 min prn         sodium chloride (PF) 0.9% PF flush 10-40 mL  10-40 mL Intracatheter Once PRN         sodium chloride (PF) 0.9% PF flush 10-40 mL  10-40 mL Intracatheter Q8H   10 mL at 07/07/23 0801     sodium chloride (PF) 0.9% PF flush 10-40 mL  10-40 mL Intracatheter Q1H PRN         sodium chloride (PF) 0.9% PF flush 3 mL  3 mL Intracatheter Q8H   3 mL at 07/07/23 1625     sodium chloride (PF) 0.9% PF flush 3 mL  3 mL Intracatheter q1 min prn         No current [facility]-ordered outpatient medications on file.             Physical Exam:   Vital signs:  Temp: 98.4  F (36.9  C) Temp src: Axillary BP: 91/50 Pulse: 58   Resp: 23 SpO2: 100 % O2 Device: Mechanical Ventilator Oxygen Delivery: 50 LPM Height: 160 cm (5' 3\") Weight: 67 kg (147 lb 11.3 oz)  Estimated body mass index is 26.17 kg/m  as calculated from the following:    Height as of this encounter: 1.6 m (5' 3\").    Weight as of this encounter: 67 kg (147 lb 11.3 oz).        Intake/Output Summary (Last 24 hours) at 7/5/2023 1706  Last data filed at " 7/5/2023 1600  Gross per 24 hour   Intake 2608.8 ml   Output 5490 ml   Net -2881.2 ml       147 lbs 11.33 oz  Body mass index is 26.17 kg/m .   Body surface area is 1.73 meters squared.    General/Constitutional: appears stated age, tracheostomy in place  Head: normocephalic, atraumatic  Respiratory: diminished breath sounds, mechanically ventilated  Cardiovascular: regular rate, regular rhythm, no obvious murmurs, no lower extremity edema  Gastrointestinal: non-rigid          Selected laboratory tests:   Laboratory test results personally reviewed:   CMP  Recent Labs   Lab 07/07/23  1601 07/07/23  1357 07/07/23  0822 07/07/23  0440 07/06/23  1608 07/06/23  1428 07/06/23  1152 07/06/23  1004 07/06/23  0804 07/06/23  0453 07/05/23  1615 07/05/23  1319 07/05/23  0814 07/05/23  0507 07/04/23  0804 07/04/23  0530 07/03/23  0413 07/03/23  0200   NA  --   --   --  136  --   --   --   --   --  135*  --   --   --  136  --  138   < > 147*   POTASSIUM  --   --   --  3.6  --  4.1  --  3.4  --  3.4  --   --   --  3.5  --  3.9   < > 3.5   CHLORIDE  --   --   --  99  --   --   --   --   --  93*  --   --   --  87*  --  80*   < > 97*   CO2  --   --   --  33*  --   --   --   --   --  36*  --   --   --  45*  --  >50*   < > 49*   ANIONGAP  --   --   --  4*  --   --   --   --   --  6*  --   --   --  4*  --   --   --  1*   * 135* 146* 137*   < >  --    < >  --    < > 158*   < >  --    < > 144*   < > 138*   < > 124*   BUN  --   --   --  28.0*  --   --   --   --   --  32.3*  --   --   --  33.7*  --  33.9*   < > 30.5*   CR  --   --   --  0.44*  --   --   --   --   --  0.50*  --   --   --  0.49*  --  0.52   < > 0.41*   GFRESTIMATED  --   --   --  >90  --   --   --   --   --  >90  --   --   --  >90  --  >90   < > >90   EDIN  --   --   --  8.1*  --   --   --   --   --  8.5*  --   --   --  8.8  --  8.9   < > 7.4*   MAG  --   --   --  1.8  --   --   --   --   --  1.8  --   --   --  1.6*  --  2.1   < > 1.8   PHOS  --   --   --  2.7  --   2.3*  --   --   --  1.4*  --  2.7  --  1.8*  --  2.7   < > 2.2*    < > = values in this interval not displayed.     CBC  Recent Labs   Lab 07/07/23  0440 07/06/23  0453 07/05/23  0507 07/04/23  0530   WBC 8.3 10.6 11.3* 16.2*   RBC 2.23* 2.43* 2.62* 3.00*   HGB 7.6* 8.1* 8.7* 10.0*   HCT 22.9* 24.4* 26.6* 30.9*   * 100 102* 103*   MCH 34.1* 33.3* 33.2* 33.3*   MCHC 33.2 33.2 32.7 32.4   RDW 15.7* 14.3 13.7 13.8    151 137* 140*     INRNo lab results found in last 7 days.  No results found for: TROPI, TROPONIN, TROPR, TROPN  Recent Labs   Lab Test 06/30/23  0505 06/29/23  1638   TRIG 97 76     Lab Results   Component Value Date    A1C 4.9 03/26/2023    A1C 4.4 12/01/2022     TSH   Date Value Ref Range Status   07/02/2023 0.31 0.30 - 4.20 uIU/mL Final   01/29/2006 4.68 0.4 - 5.0 mU/L Final

## 2023-07-07 NOTE — PLAN OF CARE
ICU End of Shift Summary.  For vital signs and complete assessments, please see documentation flowsheets.      Pertinent assessments: Patient reintubated & sedated just 12 hours post-extubation. Patient intermittently meeting RASS goal of -1/-2. Consistently tachypneic & over breathing the vent despite adjustments in sedation. Tele initially SR, then converted to Afib CVR. Lungs coarse. L chest tube with no output. Tolerating tube feeding at goal. Large, loose BM x1 this shift. Hines in place with adequate urine output. K+Mag/Phos replaced.   Major Shift Events: See timeline below.   Plan (Upcoming Events): TBD  Discharge/Transfer Needs: TBD     Bedside Shift Report Completed: Y   Bedside Safety Check Completed: Y     Goal Outcome Evaluation:  Plan of Care Reviewed With: patient, family, spouse  Overall Patient Progress: declining  Outcome Evaluation: Reintubation required due to worsened respiratory status.    2212: Tele-hub notified of increased tachypnea & WOB despite increase in sedation and use of PRNs. Orders obtained to reintubate patient. Family notified of change in patient condition.      2248: Patient reintubated. Sedation resumed.     0016: Tele-hub notified of VBG results - orders obtained for increase in ventilator RR.    0257: Tele-hub notified of decreased HR & BP, yet breathing over vent (RR 30s) with current sedation regimen - orders obtained for alternative sedation medications. See MAR for details.

## 2023-07-07 NOTE — PROGRESS NOTES
Critical Care  Note      07/07/2023    Name: Lara Melendez MRN#: [id number]   Age: 57 year old YOB: 1965     Hsptl Day# 16  ICU DAY # 16    MV DAY # 16             Problem List:   Principal Problem:    Paroxysmal atrial fibrillation with RVR (H)  Active Problems:    COPD exacerbation (H)    Anticoagulated    Acute and chronic respiratory failure with hypercapnia (H)           Summary/Hospital Course:   57-year-old female with history of COPD, emphysema, O2 dependent at home, tobacco dependence, hypertension, anxiety, atrial fibrillation on Eliquis, hypothyroidism presents with worsening shortness of breath and wheezing.  She appears to be in status asthmaticus versus worsening COPD exacerbation.  Patient was prescribed azithromycin and prednisone prior to admission.  She continued decline was brought to EMS for evaluation.  Where she was given IV steroids, IV magnesium nebulizers and BiPAP.  After admission to the ICU she failed a BiPAP trial and required intubation mechanical ventilation.  She had significant bronchospasm with high airway pressures requiring deep sedation with fentanyl ketamine and propofol and addition of a paralytic.  Events of last 24 hours noted for episodes of RVR with associated atrial fibrillation and SVT.  Patient was cardioverted back into sinus rhythm with frequent PACs. .  She was weaned off vecuronium.  She did have an episode of A-fib with RVR with relative soft pressures for which she underwent a cardioversion which converted her to atrial fib with rate control.  We are weaning her propofol actively and phenylephrine also.    Assessment and plan :     Lara Melendez IS a 57 year old female admitted on 6/20/2023 for, acute respiratory failure secondary to COPD exacerbation, small left-sided pneumothorax and history of atrial fibrillation on Eliquis..   I have personally reviewed the daily labs, imaging studies, cultures and discussed the case with referring physician  and consulting physicians.     My assessment and plan by system for this patient is as follows:    Neurology/Psychiatry:   1. Sedation  2  Pain Mgt  3. ICU Delirium   4. Neuromuscular weakness of critical illness    Plan    Back on Propofol  Contnue Precedex; try to wean back to only precedex  Back on Fentanyl drip  PT/OT eval and treat    Cardiovascular:   1.Hemodynamics -normotensive to hypertensive  2.Rhythm sinus with PACs  3. Ischemia -no signs of ischemia  4. A-fib with episodes of RVR: required cardioversion this admission.  Plan  Rate controlled  Appreciate cardiology input  Continue p.o. amiodarone.   Continue p.o. cardizem  Mag >2  K+ >4  On Apixiban for anticoagulation: held for procedure today    Pulmonary/Ventilator Management:   1. Airway intubated for acute hypoxic and hypercapnic respiratory failure  2. Oxygenation/ventilation/mechanics on full mechanical ventilatory support  Plan    Vent Mode: CMV/AC  (Continuous Mandatory Ventilation/ Assist Control)  FiO2 (%): 50 %  Resp Rate (Set): 18 breaths/min  Tidal Volume (Set, mL): 350 mL  PEEP (cm H2O): 8 cmH2O  Pressure Support (cm H2O): 7 cmH2O  Resp: 22    Re-intubated o/n  Plan for Perc Trach at bedside today  Consent obtained from     GI and Nutrition :   1.  Continue tube feeds at goal   - Patient does not meet criteria for malnutrition at this time    Plan  -continue enteral feeds.    Renal/Fluids/Electrolytes:   1.  No acute renal injury at this time  2.  Hypernatremia: resolved  3.  UnCompensated metabolic alkalosis: suspect component of contraction alkalosis from auto diuresis  4.  Appears euvolemic  Plan  - monitor function and electrolytes as needed with replacement per ICU protocols. - generally avoid nephrotoxic agents such as NSAID, IV  contrast unless specifically required  - adjust medications as needed for renal clearance  - follow I/O's as appropriate.      Infectious Disease:   1.  Sepsis: Resolved   UTI: Pseudomonas  aeruginosa   positive sputum culture: Klebsiella oxytoca    Plan  -Completed 10 day course of appropriate Abx      Endocrine:   1.  Concern for stress-induced hyperglycemia  2.  Concern for diabetes induced hyperglycemia  3.  Plan  - ICU insulin protocol, goal sugar <180      Hematology/Oncology:   1.  Leukocytosis: resolved  2.  Anemia: multifactorial  3.  Medication induced coagulopathy: On Eliquis for atrial fibrillation  Plan  -Continue to monitor     IV/Access:   1. Venous access -central access  2. Arterial access - n/a  Plan  - central access required and necessary      ICU Prophylaxis:   1. DVT: On Eliquis  2. VAP: HOB 30 degrees, chlorhexidine rinse  3. Stress Ulcer: PPI/H2 blocker  4. Restraints: None   5. Wound care  -local wound care  6. Feeding -continue tube feeds  7. Family Update:  updated via phone  8. Disposition -ICU        Key goals for next 24 hours:   1.  Perc Trach placement  2.  Pulmonary hygiene   3.  PT/OT consults           Medical History:     Past Medical History:   Diagnosis Date     Anxiety      COPD (chronic obstructive pulmonary disease) - 2L home O2      Diverticulitis of colon      Hypercholesterolemia      Hypertension      Hypothyroidism      Paroxysmal atrial fibrillation      Psoriasis      Pulmonary nodule - left upper lobe      Past Surgical History:   Procedure Laterality Date     CHOLECYSTECTOMY       INCISION AND DRAINAGE MANDIBLE, COMBINED Left 01/10/2022    Procedure: INCISION AND DRAINAGE, MANDIBLE;  Surgeon: Jn Feliz DDS;  Location: [facility] OR     INCISION AND DRAINAGE MANDIBLE, COMBINED Left 02/04/2022    Procedure: INCISION AND DRAINAGE, MANDIBLE;  Surgeon: Taylor Ortez DDS;  Location: [facility] OR     TOOTH EXTRACTION       Vocal Cord surgery       Social History     Socioeconomic History     Marital status:      Spouse name: Not on file     Number of children: Not on file     Years of education: Not on file     Highest education level: Not on file    Occupational History     Not on file   Tobacco Use     Smoking status: Never     Smokeless tobacco: Never   Substance and Sexual Activity     Alcohol use: Yes     Comment: occ     Drug use: Never     Sexual activity: Not Currently   Other Topics Concern     Not on file   Social History Narrative     Not on file     Social Determinants of Health     Financial Resource Strain: Not on file   Food Insecurity: Not on file   Transportation Needs: Not on file   Physical Activity: Not on file   Stress: Not on file   Social Connections: Not on file   Intimate Partner Violence: Not on file   Housing Stability: Not on file        Allergies   Allergen Reactions     Sulfa Antibiotics      Doxycycline Other (See Comments)     Develops chest tightness  Intolerance not allergy     Penicillins Rash     Generalized rash  Rash, Generalized                Key Medications:       [START ON 7/12/2023] amiodarone  200 mg Oral or Feeding Tube Daily     amiodarone  400 mg Oral or Feeding Tube BID     [Held by provider] apixaban ANTICOAGULANT  5 mg Per Feeding Tube BID     atorvastatin  20 mg Per Feeding Tube Daily     diltiazem  90 mg Oral or Feeding Tube Q6H ELZBIETA     insulin aspart  1-12 Units Subcutaneous Q4H     ipratropium  0.5 mg Nebulization 4x daily     levalbuterol  0.63 mg Nebulization 4x Daily     levothyroxine  112 mcg Per Feeding Tube Daily     lidocaine 1% with EPINEPHrine 1:100,000  20 mL Intradermal Once     multivitamins w/minerals  15 mL Per Feeding Tube Daily     nicotine  1 patch Transdermal Daily     nicotine   Transdermal Q8H     pantoprazole  40 mg Per Feeding Tube QAM AC    Or     pantoprazole  40 mg Intravenous QAM AC     PARoxetine  20 mg Per Feeding Tube Daily     protein modular  1 packet Per Feeding Tube BID     sodium chloride (PF)  10-40 mL Intracatheter Q8H     sodium chloride (PF)  3 mL Intracatheter Q8H       dexmedetomidine 0.7 mcg/kg/hr (07/07/23 1400)     fentaNYL 100 mcg/hr (07/07/23 1400)     lactated  ringers Stopped (06/28/23 0011)     propofol 35 mcg/kg/min (07/07/23 1400)    And     - MEDICATION INSTRUCTIONS -       norepinephrine 0.03 mcg/kg/min (07/07/23 1517)     - MEDICATION INSTRUCTIONS -          Home Meds  No current facility-administered medications on file prior to encounter.  albuterol (PROAIR HFA/PROVENTIL HFA/VENTOLIN HFA) 108 (90 Base) MCG/ACT inhaler, Inhale 2 puffs into the lungs every 4 hours as needed for shortness of breath / dyspnea or wheezing Inhale 1-2 Puffs every 4 hours as needed for Wheezing.  albuterol (PROVENTIL) (2.5 MG/3ML) 0.083% neb solution, Take 1 vial (2.5 mg) by nebulization every 4 hours as needed for shortness of breath or wheezing  apixaban ANTICOAGULANT (ELIQUIS) 5 MG tablet, Take 1 tablet (5 mg) by mouth 2 times daily  atorvastatin (LIPITOR) 20 MG tablet, Take 20 mg by mouth daily  clonazePAM (KLONOPIN) 0.5 MG tablet, Take 0.5 mg by mouth daily  cyclobenzaprine (FLEXERIL) 5 MG tablet, Take 1 tablet (5 mg) by mouth every 8 hours as needed for muscle spasms  diclofenac (VOLTAREN) 1 % topical gel, Apply 2 g topically 4 times daily  diltiazem ER COATED BEADS (CARDIZEM CD/CARTIA XT) 240 MG 24 hr capsule, Take 1 capsule (240 mg) by mouth daily  fluticasone-salmeterol (ADVAIR-HFA) 230-21 MCG/ACT inhaler, Inhale 2 puffs into the lungs 2 times daily  ipratropium - albuterol 0.5 mg/2.5 mg/3 mL (DUONEB) 0.5-2.5 (3) MG/3ML neb solution, Take 1 vial (3 mLs) by nebulization every 6 hours as needed for shortness of breath / dyspnea or wheezing  levothyroxine (SYNTHROID/LEVOTHROID) 112 MCG tablet, Take 112 mcg by mouth daily  losartan (COZAAR) 25 MG tablet, Take 1 tablet (25 mg) by mouth daily  nicotine (NICODERM CQ) 21 MG/24HR 24 hr patch, Place 1 patch onto the skin daily  PARoxetine (PAXIL) 20 MG tablet, Take 20 mg by mouth daily  predniSONE (DELTASONE) 20 MG tablet, Take 60 mg for 2 days, then 40 mg for 3 days, then 20 mg for 3 days, then 10 mg for 3 days, then stop  tiotropium  (SPIRIVA RESPIMAT) 2.5 MCG/ACT inhaler, Inhale 2 puffs into the lungs daily as needed               Physical Examination:   Temp:  [98.5  F (36.9  C)-100.6  F (38.1  C)] 99.7  F (37.6  C)  Pulse:  [] 73  Resp:  [19-51] 22  BP: ()/() 78/49  FiO2 (%):  [40 %-100 %] 50 %  SpO2:  [87 %-100 %] 95 %    Intake/Output Summary (Last 24 hours) at 6/26/2023 1215  Last data filed at 6/26/2023 1200  Gross per 24 hour   Intake 2195.19 ml   Output 4555 ml   Net -2359.81 ml     Wt Readings from Last 4 Encounters:   07/07/23 67 kg (147 lb 11.3 oz)   04/01/23 68.8 kg (151 lb 9.6 oz)   02/11/23 60 kg (132 lb 4.4 oz)   12/02/22 61.3 kg (135 lb 2.3 oz)     BP - Mean:  [] 55  Vent Mode: CMV/AC  (Continuous Mandatory Ventilation/ Assist Control)  FiO2 (%): 50 %  Resp Rate (Set): 18 breaths/min  Tidal Volume (Set, mL): 350 mL  PEEP (cm H2O): 8 cmH2O  Pressure Support (cm H2O): 7 cmH2O  Resp: 22    Recent Labs   Lab 07/07/23  0440 07/06/23  2353 07/06/23  1802 07/06/23  1428 07/04/23  1547 07/04/23  0738 07/03/23  0950 07/02/23  0751 07/01/23  0808   PH  --   --   --   --   --  7.55* 7.45 7.42 7.27*   PCO2  --   --   --   --   --  69* 84* 81* 103*   PO2  --   --   --   --   --  83 54* 71* 197*   HCO3  --   --   --   --   --  60* 59* 52* 47*   O2PER 55 65 50 65   < > 40 40 50 70    < > = values in this interval not displayed.       GEN: no acute distress consistent with chemical sedation  HEENT: head ncat, sclera anicteric, OP patent, trachea midline   PULM: unlabored synchronous with vent, diffuse wheezes anteriorly but improving overall however wheezes increased this morning, chest tube in good position show signs of titling however no air leak  CV/COR: Irregular rate and rhythm S1S2 no gallop,  No rub, no murmur  ABD: soft nontender, hypoactive bowel sounds, no mass  EXT:  Edema   warm  NEURO: Does not follow commands, consistent with chemical sedation  SKIN: no obvious rash  LINES: clean, dry intact         Data:    All data and imaging reviewed     ROUTINE ICU LABS (Last four results)  CMP  Recent Labs   Lab 07/07/23  1357 07/07/23  0822 07/07/23  0440 07/07/23  0409 07/06/23  1608 07/06/23  1428 07/06/23  1152 07/06/23  1004 07/06/23  0804 07/06/23  0453 07/05/23  1615 07/05/23  1319 07/05/23  0814 07/05/23  0507 07/04/23  0804 07/04/23  0530 07/03/23  0413 07/03/23  0200   NA  --   --  136  --   --   --   --   --   --  135*  --   --   --  136  --  138   < > 147*   POTASSIUM  --   --  3.6  --   --  4.1  --  3.4  --  3.4  --   --   --  3.5  --  3.9   < > 3.5   CHLORIDE  --   --  99  --   --   --   --   --   --  93*  --   --   --  87*  --  80*   < > 97*   CO2  --   --  33*  --   --   --   --   --   --  36*  --   --   --  45*  --  >50*   < > 49*   ANIONGAP  --   --  4*  --   --   --   --   --   --  6*  --   --   --  4*  --   --   --  1*   * 146* 137* 146*   < >  --    < >  --    < > 158*   < >  --    < > 144*   < > 138*   < > 124*   BUN  --   --  28.0*  --   --   --   --   --   --  32.3*  --   --   --  33.7*  --  33.9*   < > 30.5*   CR  --   --  0.44*  --   --   --   --   --   --  0.50*  --   --   --  0.49*  --  0.52   < > 0.41*   GFRESTIMATED  --   --  >90  --   --   --   --   --   --  >90  --   --   --  >90  --  >90   < > >90   EDIN  --   --  8.1*  --   --   --   --   --   --  8.5*  --   --   --  8.8  --  8.9   < > 7.4*   MAG  --   --  1.8  --   --   --   --   --   --  1.8  --   --   --  1.6*  --  2.1   < > 1.8   PHOS  --   --  2.7  --   --  2.3*  --   --   --  1.4*  --  2.7  --  1.8*  --  2.7   < > 2.2*    < > = values in this interval not displayed.     CBC  Recent Labs   Lab 07/07/23  0440 07/06/23  0453 07/05/23  0507 07/04/23  0530   WBC 8.3 10.6 11.3* 16.2*   RBC 2.23* 2.43* 2.62* 3.00*   HGB 7.6* 8.1* 8.7* 10.0*   HCT 22.9* 24.4* 26.6* 30.9*   * 100 102* 103*   MCH 34.1* 33.3* 33.2* 33.3*   MCHC 33.2 33.2 32.7 32.4   RDW 15.7* 14.3 13.7 13.8    151 137* 140*     INR  No lab results found in last 7  days.  Arterial Blood Gas  Recent Labs   Lab 07/07/23  0440 07/06/23  2353 07/06/23  1802 07/06/23  1428 07/04/23  1547 07/04/23  0738 07/03/23  0950 07/02/23  0751 07/01/23  0808   PH  --   --   --   --   --  7.55* 7.45 7.42 7.27*   PCO2  --   --   --   --   --  69* 84* 81* 103*   PO2  --   --   --   --   --  83 54* 71* 197*   HCO3  --   --   --   --   --  60* 59* 52* 47*   O2PER 55 65 50 65   < > 40 40 50 70    < > = values in this interval not displayed.       All cultures:  No results for input(s): CULT in the last 168 hours.  Recent Results (from the past 24 hour(s))   XR Chest Port 1 View    Narrative    CHEST ONE VIEW  6/26/2023 9:29 AM     HISTORY: Increasing oxygen requirements and airway pressure.    COMPARISON: June 23, 2023      Impression    IMPRESSION: Endotracheal tube tip 4.3 cm from the stevie. Right PICC  line stable. Minimal pleural fluid or pleural thickening bilaterally  similar to previous. Small-to-moderate pneumothorax on the left new  from previous.    Results called to the patient's nurse on June 26, 2023 at 9:44 AM.     GHAZALA ORELLANA MD         SYSTEM ID:  [id number]         Billing: This patient is critically ill: yes. Total critical care time today 50 min.            Ghazala Mackey,   Surgical Critical Care Medicine

## 2023-07-07 NOTE — PROGRESS NOTES
[facility] ICU VENTILATOR RESPIRATORY NOTE  Date of Admission: 6/20/2023  Date of Intubation (most recent): 7/6/2023  Reason for Mechanical Ventilation: RF  Number of Days on Mechanical Ventilation: 17  Met Criteria for Pressure Support Trial: No  Reason for No Pressure Support Trial: Re-intubated last night and got trached today  Significant Events Today: Tracheostomy placed at bed side   ETT appearance on chest x-ray: Treach placed today    Plan:  Patient treached today at bedside with size 8.5 Shiely without immediate complication. Treach sutured and secured by trach tie. RT will continue to monitor and support with plan of care.

## 2023-07-07 NOTE — PROGRESS NOTES
Hospitalist Medicine Progress Note   [facility]       Lara Melendez is a 57 year old female with PMH including COPD, emphysema, as needed 2-3 L O2 at home, tobacco dependence, hypertension, anxiety, paroxysmal atrial fibrillation on Eliquis, hypothyroidism, psoriasis, and hyperlipidemia who presented on 6/21 with worsening shortness of breath and wheezing.  Symptom onset was a few days prior to presentation and consisting of wheezing so her pulmonologist prescribed azithromycin and prednisone.  She took about 3 days of these medications.  Due to continued decline she called EMS and was brought to the ED for evaluation.     In the emergency department she had a clear COPD exacerbation so was given IV steroids, IV magnesium, multiple nebulizers and placed on BiPAP.  Imaging showed no clear evidence of pneumonia but she remained on azithromycin.  She was admitted to the ICU.     The day following admission her respiratory condition deteriorated to the point that she required intubation and mechanical ventilation.  She had significant bronchospasm and high airway pressures so required deep sedation with fentanyl, ketamine and propofol with addition of vecuronium.  She was given a continuous albuterol nebulizer with not much improvement.  Pulmonology was consulted as well.  The case was even discussed with [facility] and she is not an ECMO candidate.  Repeat x-ray 6/26 showed a left-sided pneumothorax and IR placed a chest tube, thoracic surgery was consulted.  Worsening hypernatremia with tube feeds, free water increased and D5 given with improvement.  She developed a fever to 102  F and she was started on IV clindamycin 6/26.  Persistent fevers and new leukocytosis antibiotics broadened to vancomycin and cefepime.  Blood cultures negative.  Sputum culture growing Klebsiella oxytoca resistant to IV Zosyn, no cefepime results so changed to IV meropenem given ongoing fevers.  Vancomycin discontinued  with negative MRSA screen.  Urine culture growing Pseudomonas.  She was able to wean off vecuronium and ketamine for 48 hours.  Aggressive IV diuresis has continued, now on Lasix drip     Acutely worsening 6/30 with A-fib with RVR and an alternate SVT not responsive to IV diltiazem or initial IV amiodarone load.  Cardioverted due to soft blood pressure and persistent rates of 170 and she converted to sinus tachycardia but still has frequent very short-lived runs of SVT lasting a few seconds.  She was continued on IV amiodarone loading.  Vent dyssynchrony during the day 6/30 and guppy breathing she was back under deep sedation with propofol, fentanyl, Versed, and vecuronium. Phenylephrine infusion added for hypotension.  Required electrical cardioversion again on 7/2.  Received digoxin load, now discontinued.  Dysrhythmias improved more recently and IV amiodarone converted to oral amiodarone and also on oral diltiazem.  Now off vecuronium.  IV meropenem narrowed to IV cefepime based on sputum and urine culture sensitivities.  Excellent urine output with Lasix drip previously and 1 dose of metolazone, holding diuretics today.      Trial of extubation 7/6 failed requiring reintubation later that evening.  Underwent trach placement 7/7       Date of Admission:  6/20/2023  Assessment & Plan   Acute on chronic  hypoxemic and hypercapnic respiratory failure  COPD with acute exacerbation  Left-sided pneumothorax  Pneumonia secondary to Klebsiella oxytoca:  At baseline she is on chronic oxygen of 2 to 3 L by nasal cannula as needed.  She has had several admissions for severe COPD requiring BiPAP, although has not previously been intubated.  Initially during this hospitalization she was on BiPAP but she quickly deteriorated so was intubated 6/21.  She developed high airway pressures and bronchospasm so required deep sedation with fentanyl, ketamine, and propofol.  Vecuronium was added due to persistent high peak  pressures. Continuous albuterol nebulizer for 24 hours provided no benefit and even seem to increase bronchospasm and has been discontinued.    Chest x-ray 6/26 showed a left-sided pneumothorax likely barotrauma in setting of emphysema and high airway pressures due to severe COPD exacerbation.  Chest tube was placed by IR.  Finished a 5-day course of azithromycin earlier.  -Intensivist to manage ventilator  -Pulmonology consulted  -We have been using a low tidal volume strategy given high ventilator pressures resulting in pneumothorax which has resulted in compensated hypercapnia which she tolerated, now increasing tidal volumes as her respiratory status is improved  -Continue IV methylprednisolone 40 mg twice daily, likely can start to taper soon  -Receiving scheduled Xopenex and Atrovent nebs.   -Weaning sedation as able to get a better assessment of her respiratory status, so far tolerating sedation wean  -Excellent diuresis with Lasix drip and 1 dose of metolazone yesterday, over 8 L out which she tolerated.  Hold on diuretics today and see how much urine she puts out over the next 12 hours  -Sputum culture 6/26 is now growing Klebsiella oxytoca that is resistant to Zosyn, so she was put on IV meropenem while awaiting sensitivities and the Klebsiella is indeed sensitive to cefepime so we have switched back to this.  Has been on appropriate antibiotics for the positive culture since 6/28  -Thoracic surgery consulted for chest tube management.  Likely keep chest tube in until extubated.  -Now on day 13 of intubation.  We will see if we can wean sedation enough to do pressure support trials with goal of extubation within the next couple of days.  If she does poorly with this we will need to discuss tracheostomy.  I previously briefly discussed this with the patient's spouse and daughter and it seemed like they would be interested in pursuing this route if necessary as the patient has never expressed any wish to  limit any medical cares  -Dr. Ovalles previously discussed the case with [facility] on she is not an ECMO candidate due to her chronic respiratory failure.  -Patient was extubated 7/6/2023  -Now on high flow nasal cannula O2 at 50 L and 100% which was later titrated to 85%        Paroxysmal atrial fibrillation  SVT: PTA on diltiazem and Eliquis.  -Cardiology consulted  -Has been receiving Eliquis during hospitalization  -Developed issues with SVT rates in the 170s and A-fib with RVR refractory to diltiazem drip.  Received IV amiodarone load and required electrical cardioversion on 7/1 and 7/2.  Also received digoxin load that has now been discontinued.  -oral amiodarone per cardiology recommendations  -LVEF greater than 70% with normal RV function  - oral diltiazem 90 mg every 6 hours  -Avoiding beta-blockers due to severe COPD  -Patient in intermittent atrial fibrillation with RVR but now in sinus rhythm    Hypotension:  Secondary to sedation and cardiac meds.  -phenylephrine as needed, avoiding levophed due to tachyarrhythmias      Possible CAUTI: Hines catheter was removed and urinalysis was obtained 6/28 that shows large blood, large LE, >182 WBCs and RBCs so a UTI is possible, but given the large blood it does make the pyuria difficult to interpret.  Urine culture growing  K Pseudomonas.  -Changed IV meropenem back to IV cefepime.  On IV antibiotics covering this since 6/28 which were later discontinued     Hyperkalemia, resolved: Potassium bola to 5.5 and persisted in that range for a day or 2.  She received several doses of Lokelma and potassium level has now normalized.  Unclear etiology.  Renal function is normal.  Her PTA losartan has been on hold.     Hypernatremia: Significant rise in sodium now up to 160 with tube feed initiation.  Improved with high-dose free water via NG and D5, but remained at about 150.  Sodium is now normalized after dose of metolazone and Lasix drip.  -BMP  tomorrow     Steroid-induced hyperglycemia: Continue medium sliding scale aspart.  Reasonable glycemic control at this time with most blood sugars in the 150 range.     Lactic acidosis: Likely due to nebulizers and hypoxia.      Anxiety: Holding prior to admission clonazepam.  Continue paroxetine and as needed IV lorazepam.     HTN: Initially had some soft blood pressure, but more recently hypertensive.  -Oral diltiazem as above  -Losartan discontinued with previous hyperkalemia and hypotension     Tobacco dependence: Nicotine replacement.     Chronic anemia: Hemoglobin at baseline of 10-11.    Plan:   Patient extubated  Related to PT OT as tolerated  BMP and CBC in a.m.      Diet: NPO for Medical/Clinical Reasons Except for: Meds  Adult Formula Drip Feeding: Continuous Promote; Nasogastric tube; Goal Rate: 50 ml/hr x 22 hrs (hold 1 hr before and after levothyroxine); mL/hr; Decreased d/t reintubation with propofol, RD will monitor trends and adjust prn; Do not advance tube ...    DVT Prophylaxis: DOAC  Hines Catheter: PRESENT, indication: Deep Sedation/Paralysis, Strict 1-2 Hour I&O  Code Status: Full Code               Miracle Charles MD  Hospitalist Service  [facility]    ______________________________________________________________________    Interval History     Symptoms   Patient was extubated around 11 AM    Review of Systems:   Patient was intubated earlier    Data reviewed today: I reviewed all medications, new labs and imaging results over the last 24 hours.     Physical Exam   Vital Signs: Temp: 98.4  F (36.9  C) Temp src: Axillary BP: 91/50 Pulse: 58   Resp: 23 SpO2: 100 % O2 Device: Mechanical Ventilator    Weight: 147 lbs 11.33 oz      GENERAL: Patient is not in acute distress  HEENT: EOM+,Conjunctiva is clear   NECK:  no Jugular Venous distention  HEART: S1 S2 regular Rate and Rhythm, there is  no murmur,   LUNGS: Respirations are  not laboured, Lungs decreased breath sounds in  lung bases to auscultation without Crepitations or Wheezing   ABDOMEN: Soft , there is no tenderness ,Bowel Sounds are  Positive   LOWER LIMBS: no  Pedal Edema  Bilaterally   CNS:  Alert, patient is pleasantly confused moving all the Four Limbs     Data   Recent Labs   Lab 07/07/23  1601 07/07/23  1357 07/07/23  0822 07/07/23  0440 07/06/23  1608 07/06/23  1428 07/06/23  1152 07/06/23  1004 07/06/23  0804 07/06/23  0453 07/05/23  0814 07/05/23  0507   WBC  --   --   --  8.3  --   --   --   --   --  10.6  --  11.3*   HGB  --   --   --  7.6*  --   --   --   --   --  8.1*  --  8.7*   MCV  --   --   --  103*  --   --   --   --   --  100  --  102*   PLT  --   --   --  154  --   --   --   --   --  151  --  137*   NA  --   --   --  136  --   --   --   --   --  135*  --  136   POTASSIUM  --   --   --  3.6  --  4.1  --  3.4  --  3.4  --  3.5   CHLORIDE  --   --   --  99  --   --   --   --   --  93*  --  87*   CO2  --   --   --  33*  --   --   --   --   --  36*  --  45*   BUN  --   --   --  28.0*  --   --   --   --   --  32.3*  --  33.7*   CR  --   --   --  0.44*  --   --   --   --   --  0.50*  --  0.49*   ANIONGAP  --   --   --  4*  --   --   --   --   --  6*  --  4*   EDIN  --   --   --  8.1*  --   --   --   --   --  8.5*  --  8.8   * 135* 146* 137*   < >  --    < >  --    < > 158*   < > 144*    < > = values in this interval not displayed.         Recent Results (from the past 24 hour(s))   XR Chest Port 1 View    Narrative    EXAM: XR CHEST PORT 1 VIEW  LOCATION: [facility]  DATE: 7/6/2023    INDICATION: Endotracheal tube positioning  COMPARISON: 07/04/2023      Impression    IMPRESSION: The ET tube and right PICC as well as the enteric tube are all unchanged in position. Left basilar subsegmental atelectasis is again noted, similar to prior exam.   XR Chest Port 1 View    Narrative    XR CHEST PORT 1 VIEW 7/7/2023 4:00 PM    HISTORY: Mechanical ventilation, paroxysmal atrial  fibrillation,  tracheostomy tube placement    COMPARISON: X-ray 7/6/2023      Impression    IMPRESSION: Interval placement of a tracheostomy tube with the tip  located in satisfactory position 4.3 cm above the stevie. Stable  satisfactory right PICC line position. Enteric tube with the distal  visualized portion in the gastric lumen. Stable left-sided pleural  drainage catheter. No definite pneumothorax. Increased left basilar  pulmonary opacities which may reflect atelectasis or pneumonia. The  right lung is clear. Normal heart size. No vascular congestion.    KAYLA HUANG MD         SYSTEM ID:  [id number]

## 2023-07-07 NOTE — PROCEDURES
Procedure:  Bronchoscopy  Indication: Visualization for tracheostomy, therapeutic suctioning of secretions, and BAL for diagnosis  Providers:  Kain Buhs M.D.        Immediately before administration of medications the patient was re-assessed for adequacy to receive sedatives including the heart rate, respiratory rate, mental status, oxygen saturation, blood pressure and adequacy of pulmonary ventilation. These same parameters were continuously monitored throughout the procedure.     Maneuvers / Procedure:     The bronchoscope was inserted through the mouth via ETT.  Copious translucent secretions were identified.  The scope was then withdrawn to the upper airway for internal visualization of the tracheostomy procedure.  Please see separate tracheostomy note for details.    The scope was then inserted through the tracheostomy tube and advanced subsegmental level of each lobe of both lungs.  Copious translucent secretions were again noted.  These were completely suctioned.  A BAL was performed in the lingula with a total of 40 cc of saline instilled and withdrawn.  Samples were sent to lab for further analysis.    A complete airway examination was performed from the distal trachea to the subsegmental level in each lobe of both lungs.  No endobronchial lesions and the mucosa was intact.      Complications: None    Estimated Blood Loss: None ml    The patient tolerated the procedure well without undue discomfort, hypotension or arrhythmia, or hypoxia.    The procedure was performed in ICU    Kain Bush M.D.  Pulmonary & Critical Care  Pager: Click Here to page

## 2023-07-07 NOTE — PROGRESS NOTES
[facility] ICU VENTILATOR RESPIRATORY NOTE  Date of Admission: 6/20/23  Date of Intubation (most recent): 7/6/23  Reason for Mechanical Ventilation: Respiratory failure, failed extubation  Number of Days on Mechanical Ventilation: 17  Met Criteria for Pressure Support Trial: No  Reason for No Pressure Support Trial: Patient was re-intubated  Vent Mode: CMV/AC  (Continuous Mandatory Ventilation/ Assist Control)  FiO2 (%): 55 %  Resp Rate (Set): 24 breaths/min  Tidal Volume (Set, mL): 350 mL  PEEP (cm H2O): 8 cmH2O  Pressure Support (cm H2O): 7 cmH2O  Resp: 30    Significant Events Today: Re-intubated  ABG Results:   Venous Blood Gas  Recent Labs   Lab 07/07/23  0440 07/06/23  2353 07/06/23  1802 07/06/23  1428   PHV 7.42 7.35 7.46* 7.42   PCO2V 55* 65* 53* 56*   PO2V 38 39 36 43   HCO3V 36* 36* 37* 36*   SUSANNAH 10.4* 9.3* 12.2* 10.5*   O2PER 55 65 50 65     ETT appearance on chest x-ray: EXAM: XR CHEST PORT 1 VIEW  LOCATION: [facility]  DATE: 7/6/2023     INDICATION: Endotracheal tube positioning  COMPARISON: 07/04/2023                                                                      IMPRESSION: The ET tube and right PICC as well as the enteric tube are all unchanged in position. Left basilar subsegmental atelectasis is again noted, similar to prior exam.    Plan:  Continue to monitor    RT Dre on 7/7/2023 at 5:34 AM

## 2023-07-07 NOTE — PLAN OF CARE
Problem: Enteral Nutrition  Goal: Safe, Effective Therapy Delivery  Outcome: Progressing  Goal: Feeding Tolerance  Outcome: Progressing   Goal Outcome Evaluation: adjust TF and increased protein modular due to reintubation and restart of propofol. RD will monitor closely and adjust TF prn.

## 2023-07-07 NOTE — PROGRESS NOTES
CLINICAL NUTRITION SERVICES - BRIEF NOTE    CURRENT NUTRITION SUPPORT  Type of Feeding Tube: NJ  Enteral Regimen: Promote @ goal of 65ml/hr x 22 hrs (1430 ml/day)   provides: 1430 kcals, 89 g PRO, 1201 ml free H20, 186 g CHO, and 0 g fiber daily.   Modulars: Prosource TF20 x 1 pkts/day = 80 kcal and 20g pro  Free Water Flush: 60 mL q 4 hours - MD managing    Propofol at current rate provides 378 kcal/day     Total energy/protein provisions = 1808 kcal (28 kcal/kg)/109 g protein (1.7 g/kg) per DW 64.5 kg     Currently overfeeding somewhat with propofol now running again.      NEW FINDINGS   Patient discussed during IDT rounds this morning. Was reintubated overnight following increased WOB despite BiPAP support. She will receive a trach today--will hold off on PEG for now per Dr. Mackey as he suspects she will be able to wean from trach relatively quickly. Noted addition of Levophed to orders, though not yet given    Labs:  - BUN 28  - Cr 0.44    Medications/Infusions:      [START ON 7/12/2023] amiodarone  200 mg Oral or Feeding Tube Daily     amiodarone  400 mg Oral or Feeding Tube BID     [Held by provider] apixaban ANTICOAGULANT  5 mg Per Feeding Tube BID     atorvastatin  20 mg Per Feeding Tube Daily     diltiazem  90 mg Oral or Feeding Tube Q6H ELZBIETA     insulin aspart  1-12 Units Subcutaneous Q4H     ipratropium  0.5 mg Nebulization 4x daily     levalbuterol  0.63 mg Nebulization 4x Daily     levothyroxine  112 mcg Per Feeding Tube Daily     lidocaine 1% with EPINEPHrine 1:100,000  20 mL Intradermal Once     multivitamins w/minerals  15 mL Per Feeding Tube Daily     nicotine  1 patch Transdermal Daily     nicotine   Transdermal Q8H     pantoprazole  40 mg Per Feeding Tube QAM AC    Or     pantoprazole  40 mg Intravenous QAM AC     PARoxetine  20 mg Per Feeding Tube Daily     protein modular  1 packet Per Feeding Tube BID     sodium chloride (PF)  10-40 mL Intracatheter Q8H     sodium chloride (PF)  3 mL  Intracatheter Q8H     vecuronium  10 mg Intravenous Once        dexmedetomidine 0.7 mcg/kg/hr (07/07/23 1200)     fentaNYL 100 mcg/hr (07/07/23 1200)     lactated ringers Stopped (06/28/23 0011)     propofol 35 mcg/kg/min (07/07/23 1202)    And     - MEDICATION INSTRUCTIONS -       norepinephrine       - MEDICATION INSTRUCTIONS -          ASSESSED NUTRITION NEEDS:  Dose weight: 64.5 kg  Estimated Energy Needs: 8567-5874 kcals/day (20-25 kcal/kg)  Justification: Vented  Estimated Protein Needs:  grams protein/day (1.2 - 2 grams of pro/kg)  Justification: Hypercatabolism with critical illness  Estimated Fluid Needs: 1925 mL/day (30 mL/kg)   Justification: Maintenance    INTERVENTIONS  Implementation  Enteral Nutrition - Modify rate - Promote @ 50 ml/hr x 22 hrs (1100 ml) provides 1100 kcal, 69 g protein, 143 g CHO, 0 g fiber, 924 ml free H2O    Increase Prosource TF 20 to 2 pkts/day= 160 kcal/40 g protein    Propofol at current rate provides an additional 378 kcal/day    Total energy/protein provisions, all sources= 1638 kcal (25 kcal/kg)/109 g protein (1.7 g/kg)    Monitoring/Evaluation  Will continue to monitor and evaluate per protocol.    Danielle Molina, RASHAD, LD, [facility]  Pager - 3rd floor/ICU: [phone]  Pager - All other floors: [phone]  Pager - Weekend/holiday: [phone]  Office: [phone]

## 2023-07-07 NOTE — ANESTHESIA CARE TRANSFER NOTE
Patient: Lara Melendez    Procedure: * No procedures listed *       Diagnosis: * No pre-op diagnosis entered *  Diagnosis Additional Information: No value filed.    Anesthesia Type:   No value filed.     Note:    Oropharynx: ventilatory support and endotracheal tube in place  Level of Consciousness: drowsy and unresponsive    Level of Supplemental Oxygen (L/min / FiO2): 1.0  Independent Airway: airway patency not satisfactory and stable  Dentition: dentition unchanged  Vital Signs Stable: post-procedure vital signs reviewed and stable  Report to RN Given: handoff report given  Patient transferred to: ICU  Comments: 2240 called by ICU for elective intubation for ICU patient who after extubation earlier today, has failed her bipap surgery and is very tachypneac and in respiratory distress.  2245 arrive IICU room #373 and RT, RNX3 are waiting and patient is alert but struggling with bipap therapy.  Pre O2 with 1.0 FiO2 for 5 mins.  Dentition assessed and labs values examined.  Propofol, 150 mg IVP and Succinylcholine 80 mg IVP given by RN.  Glidescope used to pass ETT under good glidescope view,  Et CO2 noted x 10, BBS are equal with crackles heard throughout;  RT secures ETT with 22cm at the lip and connects to ventilator.  Vital sigsn remain stable throughout, report given to RN.    ICU Handoff: Call for PAUSE to initiate/utilize ICU HANDOFF, Identified Patient, Identified Responsible Provider, Reviewed the Pertinent Medical History, Discussed Surgical Course, Reviewed Intra-OP Anesthesia Management and Issues during Anesthesia, Set Expectations for Post Procedure Period and Allowed Opportunity for Questions and Acknowledgement of Understanding      Vitals:  Vitals Value Taken Time   BP 99/64 07/06/23 2330   Temp 100.04  F (37.8  C) 07/06/23 2335   Pulse 78 07/06/23 2336   Resp 24 07/06/23 2336   SpO2 100 % 07/06/23 2336   Vitals shown include unvalidated device data.    Electronically Signed By: Gerry LANIER  ZACHARY Croft CRNA  July 6, 2023  11:37 PM

## 2023-07-08 ENCOUNTER — APPOINTMENT (OUTPATIENT)
Dept: ULTRASOUND IMAGING | Facility: CLINIC | Age: 58
End: 2023-07-08
Attending: SURGERY
Payer: COMMERCIAL

## 2023-07-08 LAB
ABO/RH(D): NORMAL
ANION GAP SERPL CALCULATED.3IONS-SCNC: 7 MMOL/L (ref 7–15)
ANTIBODY SCREEN: NEGATIVE
BASE EXCESS BLDV CALC-SCNC: 8.6 MMOL/L (ref -7.7–1.9)
BLD PROD TYP BPU: NORMAL
BLD PROD TYP BPU: NORMAL
BLOOD COMPONENT TYPE: NORMAL
BLOOD COMPONENT TYPE: NORMAL
BUN SERPL-MCNC: 26 MG/DL (ref 8–23)
C PNEUM DNA SPEC QL NAA+PROBE: NOT DETECTED
CALCIUM SERPL-MCNC: 8 MG/DL (ref 8.6–10)
CHLORIDE SERPL-SCNC: 103 MMOL/L (ref 98–107)
CK SERPL-CCNC: 54 U/L (ref 26–192)
CODING SYSTEM: NORMAL
CODING SYSTEM: NORMAL
CREAT SERPL-MCNC: 0.42 MG/DL (ref 0.51–0.95)
CROSSMATCH: NORMAL
CROSSMATCH: NORMAL
DEPRECATED HCO3 PLAS-SCNC: 30 MMOL/L (ref 22–29)
ERYTHROCYTE [DISTWIDTH] IN BLOOD BY AUTOMATED COUNT: 16.5 % (ref 10–15)
FLUAV H1 2009 PAND RNA SPEC QL NAA+PROBE: NOT DETECTED
FLUAV H1 RNA SPEC QL NAA+PROBE: NOT DETECTED
FLUAV H3 RNA SPEC QL NAA+PROBE: NOT DETECTED
FLUAV RNA SPEC QL NAA+PROBE: NOT DETECTED
FLUBV RNA SPEC QL NAA+PROBE: NOT DETECTED
GFR SERPL CREATININE-BSD FRML MDRD: >90 ML/MIN/1.73M2
GLUCOSE BLDC GLUCOMTR-MCNC: 116 MG/DL (ref 70–99)
GLUCOSE BLDC GLUCOMTR-MCNC: 142 MG/DL (ref 70–99)
GLUCOSE BLDC GLUCOMTR-MCNC: 142 MG/DL (ref 70–99)
GLUCOSE BLDC GLUCOMTR-MCNC: 144 MG/DL (ref 70–99)
GLUCOSE BLDC GLUCOMTR-MCNC: 175 MG/DL (ref 70–99)
GLUCOSE SERPL-MCNC: 149 MG/DL (ref 70–99)
HADV DNA SPEC QL NAA+PROBE: NOT DETECTED
HCO3 BLDV-SCNC: 34 MMOL/L (ref 21–28)
HCOV PNL SPEC NAA+PROBE: NOT DETECTED
HCT VFR BLD AUTO: 21.3 % (ref 35–47)
HGB BLD-MCNC: 6.8 G/DL (ref 11.7–15.7)
HMPV RNA SPEC QL NAA+PROBE: NOT DETECTED
HPIV1 RNA SPEC QL NAA+PROBE: NOT DETECTED
HPIV2 RNA SPEC QL NAA+PROBE: NOT DETECTED
HPIV3 RNA SPEC QL NAA+PROBE: NOT DETECTED
HPIV4 RNA SPEC QL NAA+PROBE: NOT DETECTED
ISSUE DATE AND TIME: NORMAL
M PNEUMO DNA SPEC QL NAA+PROBE: NOT DETECTED
MAGNESIUM SERPL-MCNC: 1.9 MG/DL (ref 1.7–2.3)
MCH RBC QN AUTO: 33.8 PG (ref 26.5–33)
MCHC RBC AUTO-ENTMCNC: 31.9 G/DL (ref 31.5–36.5)
MCV RBC AUTO: 106 FL (ref 78–100)
O2/TOTAL GAS SETTING VFR VENT: 40 %
PCO2 BLDV: 51 MM HG (ref 40–50)
PH BLDV: 7.43 [PH] (ref 7.32–7.43)
PHOSPHATE SERPL-MCNC: 3 MG/DL (ref 2.5–4.5)
PLATELET # BLD AUTO: 136 10E3/UL (ref 150–450)
PO2 BLDV: 37 MM HG (ref 25–47)
POTASSIUM SERPL-SCNC: 4.1 MMOL/L (ref 3.4–5.3)
RBC # BLD AUTO: 2.01 10E6/UL (ref 3.8–5.2)
RSV RNA SPEC QL NAA+PROBE: NOT DETECTED
RSV RNA SPEC QL NAA+PROBE: NOT DETECTED
RV+EV RNA SPEC QL NAA+PROBE: DETECTED
SODIUM SERPL-SCNC: 140 MMOL/L (ref 136–145)
SPECIMEN EXPIRATION DATE: NORMAL
TRIGL SERPL-MCNC: 111 MG/DL
UNIT ABO/RH: NORMAL
UNIT ABO/RH: NORMAL
UNIT NUMBER: NORMAL
UNIT NUMBER: NORMAL
UNIT STATUS: NORMAL
UNIT STATUS: NORMAL
UNIT TYPE ISBT: 600
UNIT TYPE ISBT: 600
WBC # BLD AUTO: 6.3 10E3/UL (ref 4–11)

## 2023-07-08 PROCEDURE — 250N000013 HC RX MED GY IP 250 OP 250 PS 637: Performed by: SURGERY

## 2023-07-08 PROCEDURE — 86850 RBC ANTIBODY SCREEN: CPT | Performed by: INTERNAL MEDICINE

## 2023-07-08 PROCEDURE — 84100 ASSAY OF PHOSPHORUS: CPT | Performed by: HOSPITALIST

## 2023-07-08 PROCEDURE — 250N000011 HC RX IP 250 OP 636: Mod: JZ | Performed by: INTERNAL MEDICINE

## 2023-07-08 PROCEDURE — 99233 SBSQ HOSP IP/OBS HIGH 50: CPT | Performed by: INTERNAL MEDICINE

## 2023-07-08 PROCEDURE — 94640 AIRWAY INHALATION TREATMENT: CPT | Mod: 76

## 2023-07-08 PROCEDURE — 86901 BLOOD TYPING SEROLOGIC RH(D): CPT | Performed by: INTERNAL MEDICINE

## 2023-07-08 PROCEDURE — 82550 ASSAY OF CK (CPK): CPT | Performed by: SURGERY

## 2023-07-08 PROCEDURE — 93970 EXTREMITY STUDY: CPT

## 2023-07-08 PROCEDURE — 86923 COMPATIBILITY TEST ELECTRIC: CPT | Performed by: INTERNAL MEDICINE

## 2023-07-08 PROCEDURE — 200N000001 HC R&B ICU

## 2023-07-08 PROCEDURE — 80048 BASIC METABOLIC PNL TOTAL CA: CPT | Performed by: INTERNAL MEDICINE

## 2023-07-08 PROCEDURE — 250N000013 HC RX MED GY IP 250 OP 250 PS 637: Performed by: INTERNAL MEDICINE

## 2023-07-08 PROCEDURE — 84478 ASSAY OF TRIGLYCERIDES: CPT | Performed by: SURGERY

## 2023-07-08 PROCEDURE — 93970 EXTREMITY STUDY: CPT | Mod: XS

## 2023-07-08 PROCEDURE — 250N000009 HC RX 250: Performed by: SURGERY

## 2023-07-08 PROCEDURE — 83735 ASSAY OF MAGNESIUM: CPT | Performed by: HOSPITALIST

## 2023-07-08 PROCEDURE — 94003 VENT MGMT INPAT SUBQ DAY: CPT

## 2023-07-08 PROCEDURE — 250N000011 HC RX IP 250 OP 636: Mod: JZ | Performed by: SURGERY

## 2023-07-08 PROCEDURE — 999N000157 HC STATISTIC RCP TIME EA 10 MIN

## 2023-07-08 PROCEDURE — 99291 CRITICAL CARE FIRST HOUR: CPT | Performed by: SURGERY

## 2023-07-08 PROCEDURE — P9016 RBC LEUKOCYTES REDUCED: HCPCS | Performed by: INTERNAL MEDICINE

## 2023-07-08 PROCEDURE — 250N000013 HC RX MED GY IP 250 OP 250 PS 637: Performed by: HOSPITALIST

## 2023-07-08 PROCEDURE — 250N000011 HC RX IP 250 OP 636: Performed by: SURGERY

## 2023-07-08 PROCEDURE — 250N000009 HC RX 250: Performed by: INTERNAL MEDICINE

## 2023-07-08 PROCEDURE — 82803 BLOOD GASES ANY COMBINATION: CPT | Performed by: SURGERY

## 2023-07-08 PROCEDURE — 85027 COMPLETE CBC AUTOMATED: CPT | Performed by: INTERNAL MEDICINE

## 2023-07-08 RX ORDER — DILTIAZEM HYDROCHLORIDE 90 MG/1
180 CAPSULE, EXTENDED RELEASE ORAL 2 TIMES DAILY
Status: DISCONTINUED | OUTPATIENT
Start: 2023-07-08 | End: 2023-07-09

## 2023-07-08 RX ORDER — MAGNESIUM SULFATE HEPTAHYDRATE 40 MG/ML
2 INJECTION, SOLUTION INTRAVENOUS ONCE
Status: COMPLETED | OUTPATIENT
Start: 2023-07-08 | End: 2023-07-08

## 2023-07-08 RX ORDER — POTASSIUM CHLORIDE 1.5 G/1.58G
40 POWDER, FOR SOLUTION ORAL ONCE
Status: COMPLETED | OUTPATIENT
Start: 2023-07-08 | End: 2023-07-08

## 2023-07-08 RX ADMIN — LEVOTHYROXINE SODIUM 112 MCG: 0.11 TABLET ORAL at 08:22

## 2023-07-08 RX ADMIN — IPRATROPIUM BROMIDE 0.5 MG: 0.5 SOLUTION RESPIRATORY (INHALATION) at 07:24

## 2023-07-08 RX ADMIN — Medication 15 ML: at 08:24

## 2023-07-08 RX ADMIN — NICOTINE 1 PATCH: 14 PATCH, EXTENDED RELEASE TRANSDERMAL at 08:18

## 2023-07-08 RX ADMIN — AMIODARONE HYDROCHLORIDE 400 MG: 200 TABLET ORAL at 08:19

## 2023-07-08 RX ADMIN — DEXMEDETOMIDINE HYDROCHLORIDE 0.5 MCG/KG/HR: 400 INJECTION INTRAVENOUS at 08:26

## 2023-07-08 RX ADMIN — PAROXETINE 20 MG: 10 SUSPENSION ORAL at 08:24

## 2023-07-08 RX ADMIN — LEVALBUTEROL HYDROCHLORIDE 0.63 MG: 0.63 SOLUTION RESPIRATORY (INHALATION) at 15:36

## 2023-07-08 RX ADMIN — IPRATROPIUM BROMIDE 0.5 MG: 0.5 SOLUTION RESPIRATORY (INHALATION) at 11:26

## 2023-07-08 RX ADMIN — ATORVASTATIN CALCIUM 20 MG: 20 TABLET, FILM COATED ORAL at 08:22

## 2023-07-08 RX ADMIN — AMIODARONE HYDROCHLORIDE 400 MG: 200 TABLET ORAL at 21:02

## 2023-07-08 RX ADMIN — POTASSIUM CHLORIDE FOR ORAL SOLUTION 40 MEQ: 1.5 POWDER, FOR SOLUTION ORAL at 13:47

## 2023-07-08 RX ADMIN — DILTIAZEM HYDROCHLORIDE 90 MG: 30 TABLET, FILM COATED ORAL at 13:47

## 2023-07-08 RX ADMIN — MAGNESIUM SULFATE HEPTAHYDRATE 2 G: 2 INJECTION, SOLUTION INTRAVENOUS at 05:33

## 2023-07-08 RX ADMIN — LEVALBUTEROL HYDROCHLORIDE 0.63 MG: 0.63 SOLUTION RESPIRATORY (INHALATION) at 11:26

## 2023-07-08 RX ADMIN — IPRATROPIUM BROMIDE 0.5 MG: 0.5 SOLUTION RESPIRATORY (INHALATION) at 15:36

## 2023-07-08 RX ADMIN — LEVALBUTEROL HYDROCHLORIDE 0.63 MG: 0.63 SOLUTION RESPIRATORY (INHALATION) at 20:54

## 2023-07-08 RX ADMIN — DILTIAZEM HYDROCHLORIDE 180 MG: 90 CAPSULE, EXTENDED RELEASE ORAL at 21:02

## 2023-07-08 RX ADMIN — LEVALBUTEROL HYDROCHLORIDE 0.63 MG: 0.63 SOLUTION RESPIRATORY (INHALATION) at 07:24

## 2023-07-08 RX ADMIN — Medication 40 MG: at 08:38

## 2023-07-08 RX ADMIN — MAGNESIUM SULFATE HEPTAHYDRATE 2 G: 2 INJECTION, SOLUTION INTRAVENOUS at 13:46

## 2023-07-08 RX ADMIN — DEXMEDETOMIDINE HYDROCHLORIDE 0.5 MCG/KG/HR: 400 INJECTION INTRAVENOUS at 20:20

## 2023-07-08 RX ADMIN — Medication 100 MCG/HR: at 03:59

## 2023-07-08 RX ADMIN — IPRATROPIUM BROMIDE 0.5 MG: 0.5 SOLUTION RESPIRATORY (INHALATION) at 20:54

## 2023-07-08 ASSESSMENT — ACTIVITIES OF DAILY LIVING (ADL)
ADLS_ACUITY_SCORE: 32
ADLS_ACUITY_SCORE: 38
ADLS_ACUITY_SCORE: 32

## 2023-07-08 NOTE — PLAN OF CARE
Goal Outcome Evaluation:      Plan of Care Reviewed With: patient    Overall Patient Progress: improvingOverall Patient Progress: improving     ICU End of Shift Summary.  For vital signs and complete assessments, please see documentation flowsheets.     Neuro: Drowsy, awakens easily. Follows commands. Can mouth answers to orientation questions. Slowly gaining ability to  hands, wiggle toes.  Cardiac: tele SR w/ PACs. Initially on levo to maintain maps >65. Pressures remain soft.  Resp: Trached 7/7. Small amount of blood at site. Small amounts cloudy, red streaked secretions. Lungs coarse. Weaned to fio2 40%. Left chest tube remains to water seal.  GI: abd rounded but soft. Small smear of BM, active BS, tolerating TF  : el in place, adequate yellow output  Skin: see flowsheets  Lines: picc  Drips: precedex    Major Shift Events:     Bronchial lavage sample from 7/7 returned + for rhinovirus and enterovirus. Added droplet precautions, notified tele hub.     Weaned off levo and propofol    Hgb 6.8, unable to reach  this AM for transfusion consent. Per tele hub MD ok to hold off until can reach , as pt hemodynamically stable    Mag low, replacing per protocol    Tmax 100.2, resolved without intervention  Plan (Upcoming Events): wean vent support and sedation as able  Discharge/Transfer Needs: off vent/dex

## 2023-07-08 NOTE — PROGRESS NOTES
[facility]  Hospitalist Progress Note  Miracle Charles MD 07/08/2023    Reason for Stay (Diagnosis): acute hypoxic respiratory failure          Assessment and Plan:      Summary of Stay: Lara Melendez is a 57 year old female with a history of COPD on prn home oxygen 2-3 L/nc, tobacco dependence, htn/hlp, PAF chronically on apixaban, anxiety, hypothyroidism, psoriasis admitted on 6/20/2023 with SOB and wheezing    Sx began a few days pta and she had called her pulmonologist who had initiated empiric azith/prednisone although sx persisted and worsened to the point the EMS was called and she was brought into the ER     Covid/flu/rsv negative  CXR with chronic SUSY infiltrate    She was admitted and placed on empiric abx/IV methylprednisolone    Admission notable to AF with RVR which converted to ST    The day after admission her respiratory status continued to decline to the point that she required intubation and mechanical ventilation on 6/21.  She had significant bronchospasm and high airway pressures requiring deep sedation with fentanyl/ketamine/propofol/vecuronium.  [facility] was contacted to assess for ECMO-but deemed not to be an appropriate candidate.  She was eventually able to wean off vec/ketamine after aggressive diuresis.     Repeat CXR on 6/26 revealed left ptx - > CT placed by IR.   She developed a fever 102 on 6/26 prompting initiation of clindamycin, but fevers persisted and wbc climbing so abx broadened to vanco and cefepime.  BCx remained negative but sputum culture grew out klebsiella oxytoca R to pip-tazo so abx switched over to meropenem and vanco discontinued due to negative MRSA screen    UCx ultimately grew out pseudomon   From a metabolic standpoint she was having progressive hypernatremia 2/2 TFs requiring free water with resolution.      On 6/30 She developed AF with RVR vs SVT not responsive to IV diltiazem and IV amiodarone and given associated soft BPs required DCCV -> NSR/ST.  IV  amio continued but still with ectomy with short runs of SVT further complicated by guppy breathing and deep sedation had to be resumed leading to to hypotension so started on phenylephrine. She required DCCV again on 7/2.  Ectopy now largely resolved and on oral amiodarone    Failed extubation of 7/6 so underwent trach placement at the bedside on 7/7.    Pulmonology consulted and recommended changing breo to [facility] and consider roflumalist or chronic azithromycin at discharge given frequent exacerbations      Problem List:   Acute on chronic hypoxic and hypercapnic respiratory failure  COPD with acute exacerbation chronically on 2-3 L/nc prn   L sided ptx  pna with sputum + for klebsiella oxytoca  Pulmonary edema 2/2 fluid resuscitation for sepsis  Complicated respiratory course-initially intubated due to progressive decline in respiratory function after admission-high airway pressures from severe bronchospasm required deep sedation including paralysis with vec.  She was also treated with 24 hours of continuous albuterol neb  Klebsiella oxytoca pna        --received 4 days azith->3 days of clinda->3 days cefepime/vanco-> 4 days of                                 meropenem/vanc->day 4 of cefepime.  Some overlap so on day 15 of abx         -- continues with some LGF in the  range.  6/26 CXR with left sided ptx         --CT placed by IR-still in place, will discuss removal tomorrow   COPD w acute exacerbation         --methylpred 125 mg bid x 5 days-> 40 mg bid x 2 days -> 40 mg every day x 5 days then            stopped 7/5          -- continues on duonebs with levalbuterol given tachycardia qid          --pulmonary recs to change home breo to [facility] and consider anti exacerbation medication             Such as roflumalist or azith at discharge   Volume overload  Echo from 3/2023 hyperdynamic EF > 70 %- no comment on diastolic dysfunction   She did require a furosemide gtt along with periodic metolazone with  excellent diuresis         -- now off all diuretics:  I/O looks even, weights-up about 4 kg from admission   Failed extubation 7/6 so underwent trach placement 7/7  Will need LTAC for recovery     Paroxysmal AF/SVT  Pta on diltiazem and apixaban (BB being avoided due to severe COPD)  Required DCCV x 2 during this hospitalization.         -on amio, dilt 90 mg q 6 hours, and apixaban            --change dilt to  bid and will consider changing to q 24 hour formulation if tolerated    Sepsis  Elevated lactate  Her hospital course has been peppered with episodic sepsis with leukocytosis/tachycardia/fever/respiratory failure etc  -resolved  -elevated lactate due to sepsis, and nebs     Hypotension   Did require transient phenylephrine support due to deep sedation     ? CAUTI  Hines cath placed for critical illness, UA 6/28 with inflammatory cells with large blood  UCx with  K with pseudomonas  -rec'd 2 days of liberty and now on day 4 of cefepime     Hyperkalemia  Potassium bola to 5.5 and persisted in that range for a day or 2.  She received several doses of Lokelma and potassium level has now normalized.  Unclear etiology.  Renal function is normal.  Her PTA losartan has been on hold    HypernatremiaSignificant rise in sodium up to 160 with tube feed initiation.  Improved with high-dose free water via NG and D5, but remained at about 150.  Sodium is now normalized after dose of metolazone and Lasix drip.  -BMP tomorrow    Hypertension pta on dilt and losartan  dilt back on and being titrated  Losartan discontinued in the setting of hyperkalemia    Anemia  Drifting down during hospitalization and now at 6.8-otherwise hemodynamically stable. No clear bleeding noted.  Suspect critical illness/venipuncture mediated  -transfuse single unit PRBC 7/8    Covid negative on admission  S/p 2 shot moderna series 5/2021      DVT Prophylaxis: DOAC  Code Status: Full Code  Functional Status:  Diet:  Hines: not in   Access  "PIVs    Disposition Plan   Expected discharge in tbd days to LTAC once bed found.     Entered: Miracle Charles MD 07/08/2023, 2:29 PM       Securely message with [employer] (more info)  Text page via [employer]aging[employer]       I spent 60 minutes reviewing epic including prior labs/imaging/medical history and notes related to this encounter  In addition time was spent in interveiwing the patient, communicating with contacts, and medical decision making      Interval History (Subjective):      Thirsty but feeling much better compared with yesterday much calmer.  Breathing is ok                   Physical Exam:      Last Vital Signs:  /71   Pulse 80   Temp 100  F (37.8  C)   Resp 25   Ht 1.6 m (5' 3\")   Wt 68.6 kg (151 lb 3.8 oz)   SpO2 98%   BMI 26.79 kg/m      I/O:  Extubated, trach in place, much calmer, nad looks stated age, looks incredibly weak.  Lungs coarse without wheeze rrr no mrg no le edema, maybe trace anasarca belly protuberant and bloated but non tender skin warm and dry no cyanosis or clubbing affect is calm            Medications:      All current medications were reviewed with changes reflected in problem list.         Data:      All new lab and imaging data was reviewed.   Labs:  Recent Labs   Lab 07/08/23 1218 07/08/23 0800 07/08/23 0416   NA  --   --  140   POTASSIUM  --   --  4.1   CHLORIDE  --   --  103   CO2  --   --  30*   ANIONGAP  --   --  7   *   < > 144*  149*   BUN  --   --  26.0*   CR  --   --  0.42*   GFRESTIMATED  --   --  >90   EDIN  --   --  8.0*    < > = values in this interval not displayed.     Recent Labs   Lab 07/08/23 0416   WBC 6.3   HGB 6.8*   HCT 21.3*   *   *     Recent Labs   Lab 07/08/23 1218 07/08/23  0800 07/08/23 0416 07/07/23  2334   * 142* 144*  149* 116*     No results for input(s): AST, ALT, GGT, ALKPHOS, BILITOTAL, BILICONJ, BILIDIRECT, LORY in the last 168 hours.    Invalid input(s): BILIRUBININDIRECT   Imaging: "   Results for orders placed or performed during the hospital encounter of 06/20/23   XR Chest Port 1 View    Narrative    EXAM: XR CHEST PORT 1 VIEW  LOCATION: [facility]  DATE: 6/21/2023    INDICATION: COPD  COMPARISON: 03/20/2023      Impression    IMPRESSION: Stable chest. Again seen is hyperinflation of both lungs. Chronic interstitial increased density in the upper left chest may represent aspirated barium or changes of calcification from old inflammation. Partially calcified thoracic aorta. No   inflammatory infiltrates or active CHF.   XR Chest Port 1 View    Narrative    XR CHEST PORT 1 VIEW 6/21/2023 12:32 PM    HISTORY: Endotracheal tube placement    COMPARISON: X-ray the same date at 0041 hours      Impression    IMPRESSION: The endotracheal tube tip is not well-seen but appears to  be approximately 7.6 cm above the stevie. Enteric tube with the distal  visualized portion below the diaphragm. No pneumothorax. No focal  pneumonic consolidation or pleural effusion. Stable clustered  calcified nodules in the left upper lobe. Normal heart size.    KAYLA HUANG MD         SYSTEM ID:  [id number]   XR Abdomen Port 1 View    Narrative    XR ABDOMEN PORT 1 VIEW 6/21/2023 12:33 PM    HISTORY: NG placement    COMPARISON: Chest x-ray the same date      Impression    IMPRESSION: Interval placement of an enteric section tube with the tip  and side-port in satisfactory position within the gastric lumen.  Nonobstructive bowel gas pattern. Right upper quadrant surgical clips.  Prominent vascular calcifications.    KAYLA HUANG MD         SYSTEM ID:  [id number]   XR Chest Port 1 View    Narrative    EXAM: XR CHEST PORT 1 VIEW  LOCATION: [facility]  DATE: 6/21/2023    INDICATION: check ETT position  COMPARISON: 06/21/2023      Impression    IMPRESSION: Endotracheal tube in good position 4 cm above the stevie. NG tube courses below the diaphragm. The lungs are clear.   XR  Chest Port 1 View    Narrative    CHEST ONE VIEW  6/23/2023 8:37 AM     HISTORY: Worsening ABGs and respiratory status on vent.    COMPARISON: June 21, 2023      Impression    IMPRESSION: Endotracheal tube tip 5 cm from the stevie. Right-sided  PICC tip in the low SVC. Trace pleural fluid or pleural thickening on  the left. No acute infiltrates. Calcifications at the left apex  stable.    GHAZALA ORELLANA MD         SYSTEM ID:  [id number]   XR Chest Port 1 View    Narrative    CHEST ONE VIEW  6/26/2023 9:29 AM     HISTORY: Increasing oxygen requirements and airway pressure.    COMPARISON: June 23, 2023      Impression    IMPRESSION: Endotracheal tube tip 4.3 cm from the stevie. Right PICC  line stable. Minimal pleural fluid or pleural thickening bilaterally  similar to previous. Small-to-moderate pneumothorax on the left new  from previous.    Results called to the patient's nurse on June 26, 2023 at 9:44 AM.     GHAZALA ORELLANA MD         SYSTEM ID:  [id number]   XR Chest Port 1 View    Narrative    CHEST ONE VIEW  6/26/2023 1:10 PM     HISTORY: Pneumothorax, followup.    COMPARISON: June 26, 2023 at 9:15 AM.      Impression    IMPRESSION: Small to moderate left pneumothorax is not significantly  changed since the comparison study. Right PICC unchanged. No acute  infiltrates. Enteric tube tip approximately 4.6 cm from the stevie.    GHAZALA ORELLANA MD         SYSTEM ID:  [id number]   CT Chest Tube with Cath Placement    Narrative    CT CHEST TUBE WITH CATH PLACEMENT  6/26/2023 3:14 PM     HISTORY:  Patient is intubated and has a spontaneous left  pneumothorax.    COMPARISON: Chest x-ray dated 6/26/2023    FINDINGS: After obtaining informed consent, the patient was placed in  a supine position on the CT table. The left anterior chest was prepped  and draped in the usual sterile manner. 1% lidocaine was injected for  local anesthesia. Under CT guidance, using a 5 Lao Yueh needle,  access into the left pleural space was  obtained. A wire was curled in  the pleural space. Over the wire, an 8 Eritrean locking pigtail catheter  was placed. Air was suctioned out of the left thorax. Follow-up CT  scan showed interval decrease in size of the left pneumothorax. The  catheter was sutured to the patient's skin and hooked to a pleura vac  suction apparatus.    The patient tolerated the procedure well. There were no immediate  postprocedure complications.      Impression    IMPRESSION: CT-guided left chest tube placed as above. Radiation dose  for this scan was reduced using automated exposure control, adjustment  of the mA and/or kV according to patient size, or iterative  reconstruction technique.    RODERICK EARL MD         SYSTEM ID:  [id number]   XR Chest Port 1 View    Narrative    CHEST PORTABLE 1 VIEW   6/27/2023 10:18 AM     HISTORY: Status post chest tube placement for pneumothorax.    COMPARISON: Chest x-ray on 6/26/2023.      Impression    IMPRESSION: Single AP view of the chest was obtained. Endotracheal  tube tip projects over the midthoracic trachea approximately 4 cm from  the stevie. Enteric tube crosses the diaphragm with distal tip outside  the field of view. Right upper extremity PICC tip projects over the  high right atrium. Cardiomediastinal silhouette is within normal  limits. Significant interval decrease/almost complete resolution of  the previously seen left pneumothorax status post chest tube  placement. Multiple small radiodensity projects over the left lung  apex, likely represent areas of calcification seen on prior chest CTs.  Otherwise, no suspicious focal pulmonary opacities. No significant  pleural effusion or pneumothorax.    TENZIN SHEPHERD MD         SYSTEM ID:  [id number]   XR Abdomen Port 1 View    Narrative    XR ABDOMEN PORT 1 VIEW   6/28/2023 4:28 PM     HISTORY: confirm placement of feeding tube    COMPARISON: Abdominal radiograph 6/21/2023      Impression    IMPRESSION: No radiographic evidence of  bowel obstruction. Interval  placement of feeding tube with tip overlying the expected position of  the ligament of Treitz. New left basilar pulmonary opacity, could  represent atelectasis or developing airspace consolidation, consider  dedicated chest radiograph. Bones are unchanged.    BRYON MARSHALL MD         SYSTEM ID:  [id number]   XR Chest Port 1 View    Narrative    CHEST ONE VIEW  6/30/2023 12:23 PM     HISTORY: Increasing airway pressure with history of pneumothorax.    COMPARISON: June 27, 2023      Impression    IMPRESSION: Endotracheal tube tip 5.5 cm from the stevie. Right PICC  tip in the low SVC. No pneumothorax.    GHAZALA ORELLANA MD         SYSTEM ID:  [id number]   XR Chest Port 1 View    Narrative    EXAM: XR CHEST PORT 1 VIEW  LOCATION: [facility]  DATE: 7/2/2023    INDICATION: ETT placement  COMPARISON: None.      Impression    IMPRESSION: Endotracheal tube tip 5.4 cm above the stevie in good position. Heart size and mediastinum are unchanged. Right PICC line with tip in the distal SVC. Increased left lower lobe opacification suggesting increasing subsegmental atelectasis with   a left pleural effusion. No pneumothorax. Right lung is clear.   XR Chest Port 1 View    Narrative    EXAM: XR CHEST PORT 1 VIEW  LOCATION: [facility]  DATE: 7/4/2023    INDICATION: ventilator  COMPARISON: 07/02/2023      Impression    IMPRESSION: The ET tube is unchanged in position. The right PICC and enteric tube are unchanged in position. The heart is unchanged in size and contour. There is mild central pulmonary venous congestion. Mild interstitial edema seen in the lung bases   bilaterally.   XR Chest Port 1 View    Narrative    EXAM: XR CHEST PORT 1 VIEW  LOCATION: [facility]  DATE: 7/6/2023    INDICATION: Endotracheal tube positioning  COMPARISON: 07/04/2023      Impression    IMPRESSION: The ET tube and right PICC as well as the enteric tube are  all unchanged in position. Left basilar subsegmental atelectasis is again noted, similar to prior exam.   XR Chest Port 1 View    Narrative    XR CHEST PORT 1 VIEW 7/7/2023 4:00 PM    HISTORY: Mechanical ventilation, paroxysmal atrial fibrillation,  tracheostomy tube placement    COMPARISON: X-ray 7/6/2023      Impression    IMPRESSION: Interval placement of a tracheostomy tube with the tip  located in satisfactory position 4.3 cm above the stevie. Stable  satisfactory right PICC line position. Enteric tube with the distal  visualized portion in the gastric lumen. Stable left-sided pleural  drainage catheter. No definite pneumothorax. Increased left basilar  pulmonary opacities which may reflect atelectasis or pneumonia. The  right lung is clear. Normal heart size. No vascular congestion.    KAYLA HUANG MD         SYSTEM ID:  [id number]

## 2023-07-08 NOTE — PROGRESS NOTES
[facility] ICU VENTILATOR RESPIRATORY NOTE  Date of Admission:6/21/23  Date of intubation (most recent): 7/6/23, pt trached on 7/7/23  Reason for Mechanical Ventilation:  Respiratory Failure  Number of Days on Mechanical Ventilation: 3  Met Criteria for Pressure Support Trial: Yes  Length of Pressure Support Trial: Pt has been weaning since 7:24 this morning and continues to wean.  Pt looks more comfortable and is doing better weaning than with full support.      Respiratory Therapy  Patient remains trached on mechanical ventilator with the following settings:    Vent Mode: CPAP/PS  (Continuous positive airway pressure with Pressure Support)  FiO2 (%): 30 %  Resp Rate (Set): 18 breaths/min  Tidal Volume (Set, mL): 350 mL  PEEP (cm H2O): 8 cmH2O  Pressure Support (cm H2O): 10 cmH2O  Resp: 25    Temp: 99.9  F (37.7  C) Temp src: Bladder BP: 125/73 Pulse: 77   Resp: 25 SpO2: 95 % O2 Device: Mechanical Ventilator      BS were diminished. Suctioned a small amount of cloudy thick secretions through the trachea.  Will continue to follow and assess the patient's respiratory needs.    RT Alexandra  7/8/2023 5:29 PM           RT Alexandra on 7/8/2023 at 5:26 PM

## 2023-07-08 NOTE — PLAN OF CARE
Goal Outcome Evaluation:  Alert able to make needs known. Trach with ventilator on CPAP mode all day calm, no resp distress. Suctioning mod amt yellow sputum. Cough weak. Extreme weakness and deconditioning to extremities. Ceiling lift to chair. Enc to move self as able. Rooke boots on. Hines removed. Ext cath placed. Given 1 UPRBC.

## 2023-07-08 NOTE — PROGRESS NOTES
Critical Care  Note      07/08/2023    Name: Lara Melendez MRN#: [id number]   Age: 57 year old YOB: 1965     Hsptl Day# 17  ICU DAY # 17    MV DAY # 17             Problem List:   Principal Problem:    Paroxysmal atrial fibrillation with RVR (H)  Active Problems:    COPD exacerbation (H)    Anticoagulated    Acute and chronic respiratory failure with hypercapnia (H)           Summary/Hospital Course:   57-year-old female with history of COPD, emphysema, O2 dependent at home, tobacco dependence, hypertension, anxiety, atrial fibrillation on Eliquis, hypothyroidism presents with worsening shortness of breath and wheezing.  She appears to be in status asthmaticus versus worsening COPD exacerbation.  Patient was prescribed azithromycin and prednisone prior to admission.  She continued decline was brought to EMS for evaluation.  Where she was given IV steroids, IV magnesium nebulizers and BiPAP.  After admission to the ICU she failed a BiPAP trial and required intubation mechanical ventilation.  She had significant bronchospasm with high airway pressures requiring deep sedation with fentanyl ketamine and propofol and addition of a paralytic.  Events of last 24 hours noted for episodes of RVR with associated atrial fibrillation and SVT.  Patient was cardioverted back into sinus rhythm with frequent PACs. .  She was weaned off vecuronium.  She did have an episode of A-fib with RVR with relative soft pressures for which she underwent a cardioversion which converted her to atrial fib with rate control.  We are weaning her propofol actively and phenylephrine also.    Assessment and plan :     Lara Melendez IS a 57 year old female admitted on 6/20/2023 for, acute respiratory failure secondary to COPD exacerbation, small left-sided pneumothorax and history of atrial fibrillation on Eliquis..   I have personally reviewed the daily labs, imaging studies, cultures and discussed the case with referring physician  and consulting physicians.     My assessment and plan by system for this patient is as follows:    Neurology/Psychiatry:   1. Sedation  2  Pain Mgt  3. ICU Delirium   4. Neuromuscular weakness of critical illness    Plan    Weaned off propofol  Contnue Precedex; wean off as toleratead  Fentanyl drip; wean off and use PRN oxy  PT/OT eval and treat  Tatyana's boot to bilateral feet      Cardiovascular:   1.Hemodynamics -normotensive to hypertensive  2.Rhythm sinus with PACs  3. Ischemia -no signs of ischemia  4. A-fib with episodes of RVR: required cardioversion this admission.  Plan  Rate controlled  Appreciate cardiology input  Continue p.o. amiodarone.   Continue p.o. cardizem  Mag >2  K+ >4  On Apixiban for anticoagulation: held today    Pulmonary/Ventilator Management:   1. Airway intubated for acute hypoxic and hypercapnic respiratory failure  2. Oxygenation/ventilation/mechanics on full mechanical ventilatory support  Plan    Vent Mode: CPAP/PS  (Continuous positive airway pressure with Pressure Support)  FiO2 (%): 30 %  Resp Rate (Set): 18 breaths/min  Tidal Volume (Set, mL): 350 mL  PEEP (cm H2O): 8 cmH2O  Pressure Support (cm H2O): 10 cmH2O  Resp: 16      S/p Perc Trach 7/7/2023  Suture removal 7/12  Ok for trach [facility]    GI and Nutrition :   1.  Continue tube feeds at goal   - Patient does not meet criteria for malnutrition at this time    Plan  -continue enteral feeds.    Renal/Fluids/Electrolytes:   1.  No acute renal injury at this time  2.  Hypernatremia: resolved  3.  Compensated respiratory acidosis  4.  Appears euvolemic  Plan  - monitor function and electrolytes as needed with replacement per ICU protocols. - generally avoid nephrotoxic agents such as NSAID, IV  contrast unless specifically required  - adjust medications as needed for renal clearance  - follow I/O's as appropriate.      Infectious Disease:   1.  Sepsis: Resolved   UTI: Pseudomonas aeruginosa   positive sputum culture: Klebsiella  oxytoca  2. Rhinovirus   - Droplet precautions   - supportive cares    Plan  -Completed 10 day course of appropriate Abx      Endocrine:   1.  Concern for stress-induced hyperglycemia  2.  Concern for diabetes induced hyperglycemia  3.  Plan  - ICU insulin protocol, goal sugar <180      Hematology/Oncology:   1.  Leukocytosis: resolved  2.  Anemia: multifactorial  3.  Medication induced coagulopathy: On Eliquis for atrial fibrillation  Plan  - Transfuse for Hgb <7  - One Unit of PRBC today  - Hold eliquis today     IV/Access:   1. Venous access -central access  2. Arterial access - n/a  Plan  - central access required and necessary      ICU Prophylaxis:   1. DVT: On Eliquis; venodynes  2. VAP: HOB 30 degrees, chlorhexidine rinse  3. Stress Ulcer: PPI/H2 blocker  4. Restraints: None   5. Wound care  -local wound care  6. Feeding -continue tube feeds  7. Family Update:  updated via phone  8. Disposition -ICU; Medically stable for LTACH        Key goals for next 24 hours:   1.  OOBTC  2.  Pulmonary hygiene   3.  PT/OT consults           Medical History:     Past Medical History:   Diagnosis Date     Anxiety      COPD (chronic obstructive pulmonary disease) - 2L home O2      Diverticulitis of colon      Hypercholesterolemia      Hypertension      Hypothyroidism      Paroxysmal atrial fibrillation      Psoriasis      Pulmonary nodule - left upper lobe      Past Surgical History:   Procedure Laterality Date     CHOLECYSTECTOMY       INCISION AND DRAINAGE MANDIBLE, COMBINED Left 01/10/2022    Procedure: INCISION AND DRAINAGE, MANDIBLE;  Surgeon: Jn Feliz DDS;  Location: [facility] OR     INCISION AND DRAINAGE MANDIBLE, COMBINED Left 02/04/2022    Procedure: INCISION AND DRAINAGE, MANDIBLE;  Surgeon: Taylor Ortez DDS;  Location: [facility] OR     TOOTH EXTRACTION       Vocal Cord surgery       Social History     Socioeconomic History     Marital status:      Spouse name: Not on file     Number of children: Not on  file     Years of education: Not on file     Highest education level: Not on file   Occupational History     Not on file   Tobacco Use     Smoking status: Never     Smokeless tobacco: Never   Substance and Sexual Activity     Alcohol use: Yes     Comment: occ     Drug use: Never     Sexual activity: Not Currently   Other Topics Concern     Not on file   Social History Narrative     Not on file     Social Determinants of Health     Financial Resource Strain: Not on file   Food Insecurity: Not on file   Transportation Needs: Not on file   Physical Activity: Not on file   Stress: Not on file   Social Connections: Not on file   Intimate Partner Violence: Not on file   Housing Stability: Not on file        Allergies   Allergen Reactions     Sulfa Antibiotics      Doxycycline Other (See Comments)     Develops chest tightness  Intolerance not allergy     Penicillins Rash     Generalized rash  Rash, Generalized                Key Medications:       [START ON 7/12/2023] amiodarone  200 mg Oral or Feeding Tube Daily     amiodarone  400 mg Oral or Feeding Tube BID     [Held by provider] apixaban ANTICOAGULANT  5 mg Per Feeding Tube BID     atorvastatin  20 mg Per Feeding Tube Daily     diltiazem  90 mg Oral or Feeding Tube Q6H ELZBIETA     insulin aspart  1-12 Units Subcutaneous Q4H     ipratropium  0.5 mg Nebulization 4x daily     levalbuterol  0.63 mg Nebulization 4x Daily     levothyroxine  112 mcg Per Feeding Tube Daily     multivitamins w/minerals  15 mL Per Feeding Tube Daily     nicotine  1 patch Transdermal Daily     nicotine   Transdermal Q8H     pantoprazole  40 mg Per Feeding Tube QAM AC    Or     pantoprazole  40 mg Intravenous QAM AC     PARoxetine  20 mg Per Feeding Tube Daily     protein modular  1 packet Per Feeding Tube BID     sodium chloride (PF)  10-40 mL Intracatheter Q8H     sodium chloride (PF)  3 mL Intracatheter Q8H       dexmedetomidine 0.5 mcg/kg/hr (07/08/23 0826)     fentaNYL 100 mcg/hr (07/08/23 0700)      lactated ringers Stopped (06/28/23 0011)     propofol Stopped (07/07/23 2315)    And     - MEDICATION INSTRUCTIONS -       norepinephrine Stopped (07/08/23 0015)     - MEDICATION INSTRUCTIONS -          Home Meds  No current facility-administered medications on file prior to encounter.  albuterol (PROAIR HFA/PROVENTIL HFA/VENTOLIN HFA) 108 (90 Base) MCG/ACT inhaler, Inhale 2 puffs into the lungs every 4 hours as needed for shortness of breath / dyspnea or wheezing Inhale 1-2 Puffs every 4 hours as needed for Wheezing.  albuterol (PROVENTIL) (2.5 MG/3ML) 0.083% neb solution, Take 1 vial (2.5 mg) by nebulization every 4 hours as needed for shortness of breath or wheezing  apixaban ANTICOAGULANT (ELIQUIS) 5 MG tablet, Take 1 tablet (5 mg) by mouth 2 times daily  atorvastatin (LIPITOR) 20 MG tablet, Take 20 mg by mouth daily  clonazePAM (KLONOPIN) 0.5 MG tablet, Take 0.5 mg by mouth daily  cyclobenzaprine (FLEXERIL) 5 MG tablet, Take 1 tablet (5 mg) by mouth every 8 hours as needed for muscle spasms  diclofenac (VOLTAREN) 1 % topical gel, Apply 2 g topically 4 times daily  diltiazem ER COATED BEADS (CARDIZEM CD/CARTIA XT) 240 MG 24 hr capsule, Take 1 capsule (240 mg) by mouth daily  fluticasone-salmeterol (ADVAIR-HFA) 230-21 MCG/ACT inhaler, Inhale 2 puffs into the lungs 2 times daily  ipratropium - albuterol 0.5 mg/2.5 mg/3 mL (DUONEB) 0.5-2.5 (3) MG/3ML neb solution, Take 1 vial (3 mLs) by nebulization every 6 hours as needed for shortness of breath / dyspnea or wheezing  levothyroxine (SYNTHROID/LEVOTHROID) 112 MCG tablet, Take 112 mcg by mouth daily  losartan (COZAAR) 25 MG tablet, Take 1 tablet (25 mg) by mouth daily  nicotine (NICODERM CQ) 21 MG/24HR 24 hr patch, Place 1 patch onto the skin daily  PARoxetine (PAXIL) 20 MG tablet, Take 20 mg by mouth daily  predniSONE (DELTASONE) 20 MG tablet, Take 60 mg for 2 days, then 40 mg for 3 days, then 20 mg for 3 days, then 10 mg for 3 days, then stop  tiotropium  (SPIRIVA RESPIMAT) 2.5 MCG/ACT inhaler, Inhale 2 puffs into the lungs daily as needed               Physical Examination:   Temp:  [97.5  F (36.4  C)-100.2  F (37.9  C)] 100  F (37.8  C)  Pulse:  [57-93] 72  Resp:  [11-26] 16  BP: ()/(47-83) 105/54  FiO2 (%):  [30 %-50 %] 30 %  SpO2:  [95 %-100 %] 98 %    Intake/Output Summary (Last 24 hours) at 6/26/2023 1215  Last data filed at 6/26/2023 1200  Gross per 24 hour   Intake 2195.19 ml   Output 4555 ml   Net -2359.81 ml     Wt Readings from Last 4 Encounters:   07/08/23 68.6 kg (151 lb 3.8 oz)   04/01/23 68.8 kg (151 lb 9.6 oz)   02/11/23 60 kg (132 lb 4.4 oz)   12/02/22 61.3 kg (135 lb 2.3 oz)     BP - Mean:  [55-98] 76  Vent Mode: CPAP/PS  (Continuous positive airway pressure with Pressure Support)  FiO2 (%): 30 %  Resp Rate (Set): 18 breaths/min  Tidal Volume (Set, mL): 350 mL  PEEP (cm H2O): 8 cmH2O  Pressure Support (cm H2O): 10 cmH2O  Resp: 16    Recent Labs   Lab 07/08/23  0416 07/07/23  0440 07/06/23  2353 07/06/23  1802 07/04/23  1547 07/04/23  0738 07/03/23  0950 07/02/23  0751   PH  --   --   --   --   --  7.55* 7.45 7.42   PCO2  --   --   --   --   --  69* 84* 81*   PO2  --   --   --   --   --  83 54* 71*   HCO3  --   --   --   --   --  60* 59* 52*   O2PER 40 55 65 50   < > 40 40 50    < > = values in this interval not displayed.       GEN: no acute distress consistent with chemical sedation  HEENT: head ncat, sclera anicteric, OP patent, trachea midline   PULM: unlabored synchronous with vent, diffuse wheezes anteriorly but improving overall however wheezes increased this morning, chest tube in good position show signs of titling however no air leak  CV/COR: Irregular rate and rhythm S1S2 no gallop,  No rub, no murmur  ABD: soft nontender, hypoactive bowel sounds, no mass  EXT:  Edema   warm  NEURO: Does not follow commands, consistent with chemical sedation  SKIN: no obvious rash  LINES: clean, dry intact         Data:   All data and imaging  reviewed     ROUTINE ICU LABS (Last four results)  CMP  Recent Labs   Lab 07/08/23  0800 07/08/23  0416 07/07/23  2334 07/07/23  0822 07/07/23  0440 07/06/23  1608 07/06/23  1428 07/06/23  1152 07/06/23  1004 07/06/23  0804 07/06/23  0453 07/05/23  0814 07/05/23  0507   NA  --  140  --   --  136  --   --   --   --   --  135*  --  136   POTASSIUM  --  4.1  --   --  3.6  --  4.1  --  3.4  --  3.4  --  3.5   CHLORIDE  --  103  --   --  99  --   --   --   --   --  93*  --  87*   CO2  --  30*  --   --  33*  --   --   --   --   --  36*  --  45*   ANIONGAP  --  7  --   --  4*  --   --   --   --   --  6*  --  4*   * 144*  149* 116*   < > 137*   < >  --    < >  --    < > 158*   < > 144*   BUN  --  26.0*  --   --  28.0*  --   --   --   --   --  32.3*  --  33.7*   CR  --  0.42*  --   --  0.44*  --   --   --   --   --  0.50*  --  0.49*   GFRESTIMATED  --  >90  --   --  >90  --   --   --   --   --  >90  --  >90   EDIN  --  8.0*  --   --  8.1*  --   --   --   --   --  8.5*  --  8.8   MAG  --  1.9  --   --  1.8  --   --   --   --   --  1.8  --  1.6*   PHOS  --  3.0  --   --  2.7  --  2.3*  --   --   --  1.4*   < > 1.8*    < > = values in this interval not displayed.     CBC  Recent Labs   Lab 07/08/23 0416 07/07/23  0440 07/06/23  0453 07/05/23  0507   WBC 6.3 8.3 10.6 11.3*   RBC 2.01* 2.23* 2.43* 2.62*   HGB 6.8* 7.6* 8.1* 8.7*   HCT 21.3* 22.9* 24.4* 26.6*   * 103* 100 102*   MCH 33.8* 34.1* 33.3* 33.2*   MCHC 31.9 33.2 33.2 32.7   RDW 16.5* 15.7* 14.3 13.7   * 154 151 137*     INR  No lab results found in last 7 days.  Arterial Blood Gas  Recent Labs   Lab 07/08/23  0416 07/07/23  0440 07/06/23  2353 07/06/23  1802 07/04/23  1547 07/04/23  0738 07/03/23  0950 07/02/23  0751   PH  --   --   --   --   --  7.55* 7.45 7.42   PCO2  --   --   --   --   --  69* 84* 81*   PO2  --   --   --   --   --  83 54* 71*   HCO3  --   --   --   --   --  60* 59* 52*   O2PER 40 55 65 50   < > 40 40 50    < > = values in this  interval not displayed.       All cultures:  No results for input(s): CULT in the last 168 hours.  Recent Results (from the past 24 hour(s))   XR Chest Port 1 View    Narrative    CHEST ONE VIEW  6/26/2023 9:29 AM     HISTORY: Increasing oxygen requirements and airway pressure.    COMPARISON: June 23, 2023      Impression    IMPRESSION: Endotracheal tube tip 4.3 cm from the stevie. Right PICC  line stable. Minimal pleural fluid or pleural thickening bilaterally  similar to previous. Small-to-moderate pneumothorax on the left new  from previous.    Results called to the patient's nurse on June 26, 2023 at 9:44 AM.     GHAZALA ORELLANA MD         SYSTEM ID:  [id number]         Billing: This patient is critically ill: yes. Total critical care time today 50 min.            Ghazala Mackey,   Surgical Critical Care Medicine

## 2023-07-08 NOTE — PROGRESS NOTES
Clinical Nutrition Brief Note     Chart check on nutrition support patient, please refer to previous RD notes for more information.     Trach placed on 7/7.     Currently on the following regimen (decreased rate yesterday given increase in propofol):   Promote @ 50 ml/hr x 22 hrs (1100 ml) provides 1100 kcal, 69 g protein, 143 g CHO, 0 g fiber, 924 ml free H2O  Prosource TF 20 to 2 pkts/day= 160 kcal/40 g protein    Total Provisions = 1260 kcal + 109 g protein --> previously on propofol as well     Medications:    [START ON 7/12/2023] amiodarone  200 mg Oral or Feeding Tube Daily     amiodarone  400 mg Oral or Feeding Tube BID     [Held by provider] apixaban ANTICOAGULANT  5 mg Per Feeding Tube BID     atorvastatin  20 mg Per Feeding Tube Daily     diltiazem  90 mg Oral or Feeding Tube Q6H ELZBIETA     insulin aspart  1-12 Units Subcutaneous Q4H     ipratropium  0.5 mg Nebulization 4x daily     levalbuterol  0.63 mg Nebulization 4x Daily     levothyroxine  112 mcg Per Feeding Tube Daily     multivitamins w/minerals  15 mL Per Feeding Tube Daily     nicotine  1 patch Transdermal Daily     nicotine   Transdermal Q8H     pantoprazole  40 mg Per Feeding Tube QAM AC    Or     pantoprazole  40 mg Intravenous QAM AC     PARoxetine  20 mg Per Feeding Tube Daily     protein modular  1 packet Per Feeding Tube BID     sodium chloride (PF)  10-40 mL Intracatheter Q8H     sodium chloride (PF)  3 mL Intracatheter Q8H        dexmedetomidine 0.5 mcg/kg/hr (07/08/23 0826)     fentaNYL 100 mcg/hr (07/08/23 0700)     lactated ringers Stopped (06/28/23 0011)     propofol Stopped (07/07/23 2315)    And     - MEDICATION INSTRUCTIONS -       norepinephrine Stopped (07/08/23 0015)     - MEDICATION INSTRUCTIONS -        acetaminophen **OR** acetaminophen, albuterol, cyclobenzaprine, glucose **OR** dextrose **OR** glucagon, sennosides **AND** docusate, fentaNYL, flumazenil, hydrALAZINE, hydrOXYzine **OR** hydrOXYzine, propofol **AND** propofol  **AND** CK total **AND** Triglycerides **AND** - MEDICATION INSTRUCTIONS - **AND** Notify Physician, midazolam, naloxone **OR** naloxone **OR** naloxone **OR** naloxone, ondansetron **OR** ondansetron, oxyCODONE, - MEDICATION INSTRUCTIONS -, polyethylene glycol, prochlorperazine **OR** prochlorperazine **OR** prochlorperazine, sodium chloride, sodium chloride (PF), sodium chloride (PF), sodium chloride (PF), sodium chloride (PF)     Labs:  Labs:  Electrolytes  Potassium (mmol/L)   Date Value   07/08/2023 4.1   07/07/2023 3.6   07/06/2023 4.1   05/17/2022 4.5   05/16/2022 4.6   05/15/2022 4.1   02/02/2006 3.4   02/01/2006 3.5   01/31/2006 3.9     Potassium POCT (mmol/L)   Date Value   06/21/2023 5.1     Phosphorus (mg/dL)   Date Value   07/08/2023 3.0   07/07/2023 2.7   07/06/2023 2.3 (L)   07/06/2023 1.4 (L)   07/05/2023 2.7   01/30/2006 3.1    Blood Glucose  Glucose (mg/dL)   Date Value   07/08/2023 149 (H)   05/17/2022 124 (H)   05/16/2022 138 (H)   05/15/2022 138 (H)   05/14/2022 120 (H)   05/14/2022 120 (H)   05/14/2022 118 (H)   02/02/2006 90   02/01/2006 90   01/31/2006 95   01/30/2006 106   01/30/2006 144 (H)     GLUCOSE BY METER POCT (mg/dL)   Date Value   07/08/2023 142 (H)   07/08/2023 144 (H)   07/07/2023 116 (H)   07/07/2023 150 (H)   07/07/2023 150 (H)     Hemoglobin A1C (%)   Date Value   03/26/2023 4.9   12/01/2022 4.4    Inflammatory Markers  CRP Inflammation (mg/L)   Date Value   05/17/2022 <2.9   05/16/2022 <2.9   05/15/2022 3.6     WBC (10e9/L)   Date Value   02/02/2006 6.2   02/01/2006 7.7   01/31/2006 8.2     WBC Count (10e3/uL)   Date Value   07/08/2023 6.3   07/07/2023 8.3   07/06/2023 10.6     Albumin (g/dL)   Date Value   06/30/2023 3.3 (L)   06/21/2023 4.5   03/26/2023 4.0   05/15/2022 3.0 (L)   05/14/2022 3.5   05/14/2022 3.5   02/01/2006 2.3 (L)   01/30/2006 2.5 (L)   01/30/2006 2.9 (L)      Magnesium (mg/dL)   Date Value   07/08/2023 1.9   07/07/2023 1.8   07/06/2023 1.8   01/31/2006 2.0    01/31/2006 1.4 (L)   01/30/2006 2.6 (H)     Magnesium Laxative Screen (no units)   Date Value   01/30/2006 91.0     Sodium (mmol/L)   Date Value   07/08/2023 140   07/07/2023 136   07/06/2023 135 (L)   02/02/2006 133   02/01/2006 135   01/31/2006 130 (L)    Renal  Urea Nitrogen (mg/dL)   Date Value   07/08/2023 26.0 (H)   07/07/2023 28.0 (H)   07/06/2023 32.3 (H)   05/17/2022 17   05/16/2022 14   05/15/2022 10   02/02/2006 <2 (L)   02/01/2006 3 (L)   01/31/2006 4 (L)     UREA NITROGEN POCT (mg/dL)   Date Value   06/21/2023 7     Creatinine (mg/dL)   Date Value   07/08/2023 0.42 (L)   07/07/2023 0.44 (L)   07/06/2023 0.50 (L)   02/02/2006 0.80   02/01/2006 0.90   01/31/2006 0.90     Additional  Triglycerides (mg/dL)   Date Value   06/30/2023 97   06/29/2023 76   06/23/2023 156 (H)     Ketones Urine (mg/dL)   Date Value   06/28/2023 Negative   01/29/2006 Negative        ASSESSED NUTRITION NEEDS:  Dose weight: 64.5 kg  Estimated Energy Needs: 7232-4995 kcals/day (20-25 kcal/kg)  Justification: Vented  Estimated Protein Needs:  grams protein/day (1.2 - 2 grams of pro/kg)  Justification: Hypercatabolism with critical illness  Estimated Fluid Needs: 1925 mL/day (30 mL/kg)   Justification: Maintenance    INTERVENTIONS  Implementation  Enteral Nutrition - Modify rate - Promote @ 65 ml/hr x 22 hrs (1430 ml) provides 1430 kcal, 89 g protein, 186 g CHO, 0 g fiber, 1200 ml free H2O  Free water flush 30 mL before and after each feeding - MD managing      Decreased Prosource TF 20 to 1 pkt/day= 80 kcal/20 g protein (spoke with pharmD, ok to override medication interaction, patient has been receiving throughout admission. Signed per staff recommendation)     Propofol off      Total energy/protein provisions, all sources= 1510 kcal (23 kcal/kg)/ 109 g protein (1.7 g/kg) per DW 64.5 kg     Ok to increase to goal rate at this time.     Milagro Pereira, RASHAD, LD  Clinical Dietitian     3rd floor/ICU: [phone]  All other  floors: [phone]  Weekend/holiday: [phone]  Office: [phone]

## 2023-07-08 NOTE — PROGRESS NOTES
"Pending sale to [facility] ICU VENTILATOR RESPIRATORY NOTE  Date of Admission: 6/20/2023  Date of Intubation (most recent): 7/6/2023  Reason for Mechanical Ventilation: Respiratory Failure  Number of Days on Mechanical Ventilation: 3  Met Criteria for Pressure Support Trial: No  Reason for No Pressure Support Trial: Re-intubated 7/6/23 and got trached 7/7/23    Vent Mode: CMV/AC  (Continuous Mandatory Ventilation/ Assist Control)  FiO2 (%): 40 %  Resp Rate (Set): 18 breaths/min  Tidal Volume (Set, mL): 350 mL  PEEP (cm H2O): 8 cmH2O  Resp: 20    Vital signs:  Temp: 100.2  F (37.9  C) Temp src: Bladder BP: 120/83 Pulse: 74   Resp: 20 SpO2: 99 % O2 Device: Mechanical Ventilator Oxygen Delivery: 50 LPM Height: 160 cm (5' 3\") Weight: 67 kg (147 lb 11.3 oz)  Estimated body mass index is 26.17 kg/m  as calculated from the following:    Height as of this encounter: 1.6 m (5' 3\").    Weight as of this encounter: 67 kg (147 lb 11.3 oz).    Venous Blood Gas  Recent Labs   Lab 07/07/23  0440 07/06/23  2353 07/06/23  1802 07/06/23  1428   PHV 7.42 7.35 7.46* 7.42   PCO2V 55* 65* 53* 56*   PO2V 38 39 36 43   HCO3V 36* 36* 37* 36*   SUSANNAH 10.4* 9.3* 12.2* 10.5*   O2PER 55 65 50 65       "

## 2023-07-09 ENCOUNTER — APPOINTMENT (OUTPATIENT)
Dept: OCCUPATIONAL THERAPY | Facility: CLINIC | Age: 58
End: 2023-07-09
Attending: SURGERY
Payer: COMMERCIAL

## 2023-07-09 LAB
ALBUMIN SERPL BCG-MCNC: 2.9 G/DL (ref 3.5–5.2)
ALP SERPL-CCNC: 63 U/L (ref 35–104)
ALT SERPL W P-5'-P-CCNC: 78 U/L (ref 0–50)
ANION GAP SERPL CALCULATED.3IONS-SCNC: 6 MMOL/L (ref 7–15)
AST SERPL W P-5'-P-CCNC: 25 U/L (ref 0–45)
BACTERIA BRONCH: NORMAL
BILIRUB SERPL-MCNC: 0.3 MG/DL
BUN SERPL-MCNC: 21.1 MG/DL (ref 8–23)
CALCIUM SERPL-MCNC: 8.4 MG/DL (ref 8.6–10)
CHLORIDE SERPL-SCNC: 105 MMOL/L (ref 98–107)
CMV DNA SPEC NAA+PROBE-ACNC: NOT DETECTED IU/ML
CREAT SERPL-MCNC: 0.41 MG/DL (ref 0.51–0.95)
DEPRECATED HCO3 PLAS-SCNC: 29 MMOL/L (ref 22–29)
ERYTHROCYTE [DISTWIDTH] IN BLOOD BY AUTOMATED COUNT: 18.5 % (ref 10–15)
GFR SERPL CREATININE-BSD FRML MDRD: >90 ML/MIN/1.73M2
GLUCOSE BLDC GLUCOMTR-MCNC: 128 MG/DL (ref 70–99)
GLUCOSE BLDC GLUCOMTR-MCNC: 130 MG/DL (ref 70–99)
GLUCOSE BLDC GLUCOMTR-MCNC: 130 MG/DL (ref 70–99)
GLUCOSE BLDC GLUCOMTR-MCNC: 133 MG/DL (ref 70–99)
GLUCOSE BLDC GLUCOMTR-MCNC: 145 MG/DL (ref 70–99)
GLUCOSE SERPL-MCNC: 139 MG/DL (ref 70–99)
HCT VFR BLD AUTO: 23.6 % (ref 35–47)
HGB BLD-MCNC: 7.6 G/DL (ref 11.7–15.7)
MAGNESIUM SERPL-MCNC: 1.9 MG/DL (ref 1.7–2.3)
MCH RBC QN AUTO: 32.3 PG (ref 26.5–33)
MCHC RBC AUTO-ENTMCNC: 32.2 G/DL (ref 31.5–36.5)
MCV RBC AUTO: 100 FL (ref 78–100)
PHOSPHATE SERPL-MCNC: 2.5 MG/DL (ref 2.5–4.5)
PLATELET # BLD AUTO: 148 10E3/UL (ref 150–450)
POTASSIUM SERPL-SCNC: 3.8 MMOL/L (ref 3.4–5.3)
PROT SERPL-MCNC: 5.7 G/DL (ref 6.4–8.3)
RBC # BLD AUTO: 2.35 10E6/UL (ref 3.8–5.2)
SODIUM SERPL-SCNC: 140 MMOL/L (ref 136–145)
WBC # BLD AUTO: 7.1 10E3/UL (ref 4–11)

## 2023-07-09 PROCEDURE — 250N000013 HC RX MED GY IP 250 OP 250 PS 637: Performed by: INTERNAL MEDICINE

## 2023-07-09 PROCEDURE — 999N000259 HC STATISTIC EXTUBATION

## 2023-07-09 PROCEDURE — 83735 ASSAY OF MAGNESIUM: CPT | Performed by: INTERNAL MEDICINE

## 2023-07-09 PROCEDURE — 97110 THERAPEUTIC EXERCISES: CPT | Mod: GO | Performed by: OCCUPATIONAL THERAPIST

## 2023-07-09 PROCEDURE — 80053 COMPREHEN METABOLIC PANEL: CPT | Performed by: INTERNAL MEDICINE

## 2023-07-09 PROCEDURE — 200N000001 HC R&B ICU

## 2023-07-09 PROCEDURE — 250N000009 HC RX 250: Performed by: INTERNAL MEDICINE

## 2023-07-09 PROCEDURE — 250N000013 HC RX MED GY IP 250 OP 250 PS 637: Performed by: SURGERY

## 2023-07-09 PROCEDURE — 250N000011 HC RX IP 250 OP 636: Mod: JZ | Performed by: INTERNAL MEDICINE

## 2023-07-09 PROCEDURE — 99231 SBSQ HOSP IP/OBS SF/LOW 25: CPT | Performed by: INTERNAL MEDICINE

## 2023-07-09 PROCEDURE — 999N000040 HC STATISTIC CONSULT NO CHARGE VASC ACCESS

## 2023-07-09 PROCEDURE — 250N000013 HC RX MED GY IP 250 OP 250 PS 637: Performed by: HOSPITALIST

## 2023-07-09 PROCEDURE — 97166 OT EVAL MOD COMPLEX 45 MIN: CPT | Mod: GO | Performed by: OCCUPATIONAL THERAPIST

## 2023-07-09 PROCEDURE — 94640 AIRWAY INHALATION TREATMENT: CPT | Mod: 76

## 2023-07-09 PROCEDURE — 999N000157 HC STATISTIC RCP TIME EA 10 MIN

## 2023-07-09 PROCEDURE — 99291 CRITICAL CARE FIRST HOUR: CPT | Performed by: SURGERY

## 2023-07-09 PROCEDURE — 94003 VENT MGMT INPAT SUBQ DAY: CPT

## 2023-07-09 PROCEDURE — 250N000011 HC RX IP 250 OP 636: Mod: JZ | Performed by: SURGERY

## 2023-07-09 PROCEDURE — 250N000009 HC RX 250: Performed by: SURGERY

## 2023-07-09 PROCEDURE — 999N000253 HC STATISTIC WEANING TRIALS

## 2023-07-09 PROCEDURE — 85027 COMPLETE CBC AUTOMATED: CPT | Performed by: INTERNAL MEDICINE

## 2023-07-09 PROCEDURE — 84100 ASSAY OF PHOSPHORUS: CPT | Performed by: INTERNAL MEDICINE

## 2023-07-09 RX ORDER — MAGNESIUM SULFATE HEPTAHYDRATE 40 MG/ML
2 INJECTION, SOLUTION INTRAVENOUS ONCE
Status: COMPLETED | OUTPATIENT
Start: 2023-07-09 | End: 2023-07-09

## 2023-07-09 RX ORDER — DILTIAZEM HYDROCHLORIDE 30 MG/1
60 TABLET, FILM COATED ORAL EVERY 6 HOURS SCHEDULED
Status: DISCONTINUED | OUTPATIENT
Start: 2023-07-09 | End: 2023-07-11

## 2023-07-09 RX ORDER — SODIUM CHLORIDE FOR INHALATION 3 %
3 VIAL, NEBULIZER (ML) INHALATION
Status: DISCONTINUED | OUTPATIENT
Start: 2023-07-09 | End: 2023-07-28 | Stop reason: HOSPADM

## 2023-07-09 RX ORDER — POTASSIUM CHLORIDE 1.5 G/1.58G
20 POWDER, FOR SOLUTION ORAL ONCE
Status: COMPLETED | OUTPATIENT
Start: 2023-07-09 | End: 2023-07-09

## 2023-07-09 RX ORDER — OXYCODONE HCL 5 MG/5 ML
5 SOLUTION, ORAL ORAL EVERY 4 HOURS
Status: DISCONTINUED | OUTPATIENT
Start: 2023-07-09 | End: 2023-07-14

## 2023-07-09 RX ADMIN — AMIODARONE HYDROCHLORIDE 400 MG: 200 TABLET ORAL at 20:10

## 2023-07-09 RX ADMIN — LEVALBUTEROL HYDROCHLORIDE 0.63 MG: 0.63 SOLUTION RESPIRATORY (INHALATION) at 15:27

## 2023-07-09 RX ADMIN — LEVALBUTEROL HYDROCHLORIDE 0.63 MG: 0.63 SOLUTION RESPIRATORY (INHALATION) at 07:38

## 2023-07-09 RX ADMIN — DILTIAZEM HYDROCHLORIDE 60 MG: 30 TABLET, FILM COATED ORAL at 20:10

## 2023-07-09 RX ADMIN — PAROXETINE 20 MG: 10 SUSPENSION ORAL at 09:09

## 2023-07-09 RX ADMIN — IPRATROPIUM BROMIDE 0.5 MG: 0.5 SOLUTION RESPIRATORY (INHALATION) at 19:45

## 2023-07-09 RX ADMIN — AMIODARONE HYDROCHLORIDE 400 MG: 200 TABLET ORAL at 09:12

## 2023-07-09 RX ADMIN — DEXMEDETOMIDINE HYDROCHLORIDE 0.2 MCG/KG/HR: 400 INJECTION INTRAVENOUS at 09:55

## 2023-07-09 RX ADMIN — HYDROXYZINE HYDROCHLORIDE 25 MG: 10 SOLUTION ORAL at 09:12

## 2023-07-09 RX ADMIN — Medication 15 ML: at 09:11

## 2023-07-09 RX ADMIN — IPRATROPIUM BROMIDE 0.5 MG: 0.5 SOLUTION RESPIRATORY (INHALATION) at 07:38

## 2023-07-09 RX ADMIN — ACETAMINOPHEN 650 MG: 325 SOLUTION ORAL at 05:56

## 2023-07-09 RX ADMIN — POTASSIUM CHLORIDE FOR ORAL SOLUTION 20 MEQ: 1.5 POWDER, FOR SOLUTION ORAL at 05:42

## 2023-07-09 RX ADMIN — ATORVASTATIN CALCIUM 20 MG: 20 TABLET, FILM COATED ORAL at 09:12

## 2023-07-09 RX ADMIN — DILTIAZEM HYDROCHLORIDE 180 MG: 90 CAPSULE, EXTENDED RELEASE ORAL at 09:12

## 2023-07-09 RX ADMIN — IPRATROPIUM BROMIDE 0.5 MG: 0.5 SOLUTION RESPIRATORY (INHALATION) at 11:54

## 2023-07-09 RX ADMIN — OXYCODONE HYDROCHLORIDE 5 MG: 5 SOLUTION ORAL at 09:12

## 2023-07-09 RX ADMIN — LEVALBUTEROL HYDROCHLORIDE 0.63 MG: 0.63 SOLUTION RESPIRATORY (INHALATION) at 19:45

## 2023-07-09 RX ADMIN — Medication 40 MG: at 09:09

## 2023-07-09 RX ADMIN — LEVOTHYROXINE SODIUM 112 MCG: 0.11 TABLET ORAL at 09:12

## 2023-07-09 RX ADMIN — OXYCODONE HYDROCHLORIDE 5 MG: 5 SOLUTION ORAL at 16:07

## 2023-07-09 RX ADMIN — NICOTINE 1 PATCH: 14 PATCH, EXTENDED RELEASE TRANSDERMAL at 09:11

## 2023-07-09 RX ADMIN — MAGNESIUM SULFATE HEPTAHYDRATE 2 G: 2 INJECTION, SOLUTION INTRAVENOUS at 09:08

## 2023-07-09 RX ADMIN — OXYCODONE HYDROCHLORIDE 5 MG: 5 SOLUTION ORAL at 20:10

## 2023-07-09 RX ADMIN — OXYCODONE HYDROCHLORIDE 5 MG: 5 SOLUTION ORAL at 12:47

## 2023-07-09 RX ADMIN — MAGNESIUM SULFATE HEPTAHYDRATE 2 G: 2 INJECTION, SOLUTION INTRAVENOUS at 05:42

## 2023-07-09 RX ADMIN — POTASSIUM & SODIUM PHOSPHATES POWDER PACK 280-160-250 MG 1 PACKET: 280-160-250 PACK at 09:12

## 2023-07-09 RX ADMIN — LEVALBUTEROL HYDROCHLORIDE 0.63 MG: 0.63 SOLUTION RESPIRATORY (INHALATION) at 11:54

## 2023-07-09 RX ADMIN — POTASSIUM & SODIUM PHOSPHATES POWDER PACK 280-160-250 MG 1 PACKET: 280-160-250 PACK at 05:42

## 2023-07-09 RX ADMIN — IPRATROPIUM BROMIDE 0.5 MG: 0.5 SOLUTION RESPIRATORY (INHALATION) at 15:27

## 2023-07-09 ASSESSMENT — ACTIVITIES OF DAILY LIVING (ADL)
ADLS_ACUITY_SCORE: 38

## 2023-07-09 NOTE — PLAN OF CARE
Goal Outcome Evaluation:      Plan of Care Reviewed With: patient    Overall Patient Progress: no changeOverall Patient Progress: no change       ICU End of Shift Summary.  For vital signs and complete assessments, please see documentation flowsheets.     Neuro: A&O.   Cardiac: tele SB-SR inverted T w/ pac, BP stable  Resp: Lungs coarse. Trach site looks good. Thick, cloudy red streaked secretions. Vent supported overnight, pressure support started about 0510 this AM  GI: tolerating TF, small amounts of brown soft BM  : unable to void overnight. Bladder scanned > 300ml. Straight cath for 700ml yellow urine.   Skin: pale, bruised  Lines: picc, piv  Drips: dex, fent    Major Shift Events:     Straight cath for 700ml at 0400    K, mag, and phos being replaced per protocols this AM  Plan (Upcoming Events): continue weaning from vent as able.  Discharge/Transfer Needs: wean precedex, fentanyl

## 2023-07-09 NOTE — PROGRESS NOTES
Critical Care  Note      07/09/2023    Name: Lara Melendez MRN#: [id number]   Age: 57 year old YOB: 1965     Hsptl Day# 18  ICU DAY # 18    MV DAY # 18             Problem List:   Principal Problem:    Paroxysmal atrial fibrillation with RVR (H)  Active Problems:    COPD exacerbation (H)    Anticoagulated    Acute and chronic respiratory failure with hypercapnia (H)           Summary/Hospital Course:   57-year-old female with history of COPD, emphysema, O2 dependent at home, tobacco dependence, hypertension, anxiety, atrial fibrillation on Eliquis, hypothyroidism presents with worsening shortness of breath and wheezing.  She appears to be in status asthmaticus versus worsening COPD exacerbation.  Patient was prescribed azithromycin and prednisone prior to admission.  She continued decline was brought to EMS for evaluation.  Where she was given IV steroids, IV magnesium nebulizers and BiPAP.  After admission to the ICU she failed a BiPAP trial and required intubation mechanical ventilation.  She had significant bronchospasm with high airway pressures requiring deep sedation with fentanyl ketamine and propofol and addition of a paralytic.  Events of last 24 hours noted for episodes of RVR with associated atrial fibrillation and SVT.  Patient was cardioverted back into sinus rhythm with frequent PACs. .  She was weaned off vecuronium.  She did have an episode of A-fib with RVR with relative soft pressures for which she underwent a cardioversion which converted her to atrial fib with rate control.  We are weaning her propofol actively and phenylephrine also.    Assessment and plan :     Lara Melendez IS a 57 year old female admitted on 6/20/2023 for, acute respiratory failure secondary to COPD exacerbation, small left-sided pneumothorax and history of atrial fibrillation on Eliquis..   I have personally reviewed the daily labs, imaging studies, cultures and discussed the case with referring physician  and consulting physicians.     My assessment and plan by system for this patient is as follows:    Neurology/Psychiatry:   1. Sedation  2  Pain Mgt  3. ICU Delirium   4. Neuromuscular weakness of critical illness    Plan    Contnue Precedex; wean off as toleratead  Discontinue fentanyl drip.  Scheduled 5mg Oxy q4  PT/OT: OERICK Joe's boot to bilateral feet      Cardiovascular:   1.Hemodynamics -normotensive to hypertensive  2.Rhythm sinus with PACs  3. Ischemia -no signs of ischemia  4. A-fib with episodes of RVR: required cardioversion this admission.  Plan  Rate controlled  Appreciate cardiology input  Continue p.o. amiodarone.   Continue p.o. cardizem  Mag >2  K+ >4  On Apixiban for anticoagulation: held this morning; plan to restart tonight    Pulmonary/Ventilator Management:   1. Airway intubated for acute hypoxic and hypercapnic respiratory failure  2. Oxygenation/ventilation/mechanics on full mechanical ventilatory support  Plan    Vent Mode: CPAP/PS  (Continuous positive airway pressure with Pressure Support)  FiO2 (%): 30 %  Resp Rate (Set): 18 breaths/min  Tidal Volume (Set, mL): 350 mL  PEEP (cm H2O): 8 cmH2O  Pressure Support (cm H2O): 10 cmH2O  Resp: 10      S/p Perc Trach 7/7/2023  Suture removal 7/12  Ok for trach [facility]    GI and Nutrition :   1.  Continue tube feeds at goal   - Patient does not meet criteria for malnutrition at this time    Plan  -continue enteral feeds.    Renal/Fluids/Electrolytes:   1.  No acute renal injury at this time  2.  Hypernatremia: resolved  3.  Compensated respiratory acidosis  4.  Appears euvolemic  Plan  - monitor function and electrolytes as needed with replacement per ICU protocols. - generally avoid nephrotoxic agents such as NSAID, IV  contrast unless specifically required  - adjust medications as needed for renal clearance  - follow I/O's as appropriate.      Infectious Disease:   1.  Sepsis: Resolved   UTI: Pseudomonas aeruginosa   positive sputum culture:  Klebsiella oxytoca  2. Rhinovirus   - Droplet precautions   - supportive cares    Plan  -Completed 10 day course of appropriate Abx      Endocrine:   1.  Concern for stress-induced hyperglycemia  2.  Concern for diabetes induced hyperglycemia  3.  Plan  - ICU insulin protocol, goal sugar <180      Hematology/Oncology:   1.  Leukocytosis: resolved  2.  Anemia: multifactorial  3.  Medication induced coagulopathy: On Eliquis for atrial fibrillation  Plan  - Transfuse for Hgb <7  - One Unit of PRBC today  - Hold AM dose eliquis; if no signs of bleeding, will restart PM dose     IV/Access:   1. Venous access -central access  2. Arterial access - n/a  Plan  - central access required and necessary      ICU Prophylaxis:   1. DVT: Eliquis; venodynes  2. VAP: HOB 30 degrees, chlorhexidine rinse  3. Stress Ulcer: PPI/H2 blocker  4. Restraints: None   5. Wound care  -local wound care  6. Feeding -continue tube feeds  7. Family Update:  updated via phone  8. Disposition -ICU; Medically stable for LTACH        Key goals for next 24 hours:   1.  OOBTC  2.  Pulmonary hygiene   3.  PT/OT            Medical History:     Past Medical History:   Diagnosis Date     Anxiety      COPD (chronic obstructive pulmonary disease) - 2L home O2      Diverticulitis of colon      Hypercholesterolemia      Hypertension      Hypothyroidism      Paroxysmal atrial fibrillation      Psoriasis      Pulmonary nodule - left upper lobe      Past Surgical History:   Procedure Laterality Date     CHOLECYSTECTOMY       INCISION AND DRAINAGE MANDIBLE, COMBINED Left 01/10/2022    Procedure: INCISION AND DRAINAGE, MANDIBLE;  Surgeon: Jn Feliz DDS;  Location: [facility] OR     INCISION AND DRAINAGE MANDIBLE, COMBINED Left 02/04/2022    Procedure: INCISION AND DRAINAGE, MANDIBLE;  Surgeon: Taylor Ortez DDS;  Location: [facility] OR     TOOTH EXTRACTION       Vocal Cord surgery       Social History     Socioeconomic History     Marital status:      Spouse  name: Not on file     Number of children: Not on file     Years of education: Not on file     Highest education level: Not on file   Occupational History     Not on file   Tobacco Use     Smoking status: Never     Smokeless tobacco: Never   Substance and Sexual Activity     Alcohol use: Yes     Comment: occ     Drug use: Never     Sexual activity: Not Currently   Other Topics Concern     Not on file   Social History Narrative     Not on file     Social Determinants of Health     Financial Resource Strain: Not on file   Food Insecurity: Not on file   Transportation Needs: Not on file   Physical Activity: Not on file   Stress: Not on file   Social Connections: Not on file   Intimate Partner Violence: Not on file   Housing Stability: Not on file        Allergies   Allergen Reactions     Sulfa Antibiotics      Doxycycline Other (See Comments)     Develops chest tightness  Intolerance not allergy     Penicillins Rash     Generalized rash  Rash, Generalized                Key Medications:       [START ON 7/12/2023] amiodarone  200 mg Oral or Feeding Tube Daily     amiodarone  400 mg Oral or Feeding Tube BID     [Held by provider] apixaban ANTICOAGULANT  5 mg Per Feeding Tube BID     atorvastatin  20 mg Per Feeding Tube Daily     diltiazem ER  180 mg Oral BID     insulin aspart  1-12 Units Subcutaneous Q4H     ipratropium  0.5 mg Nebulization 4x daily     levalbuterol  0.63 mg Nebulization 4x Daily     levothyroxine  112 mcg Per Feeding Tube Daily     magnesium sulfate  2 g Intravenous Once     multivitamins w/minerals  15 mL Per Feeding Tube Daily     nicotine  1 patch Transdermal Daily     nicotine   Transdermal Q8H     oxyCODONE  5 mg Per Feeding Tube Q4H     pantoprazole  40 mg Per Feeding Tube QAM AC     PARoxetine  20 mg Per Feeding Tube Daily     potassium & sodium phosphates  1 packet Oral or Feeding Tube Q4H     protein modular  1 packet Per Feeding Tube Daily     sodium chloride (PF)  10-40 mL Intracatheter Q8H      sodium chloride (PF)  3 mL Intracatheter Q8H       dexmedetomidine 0.2 mcg/kg/hr (07/09/23 0700)     lactated ringers Stopped (06/28/23 0011)     - MEDICATION INSTRUCTIONS -          Home Meds  No current facility-administered medications on file prior to encounter.  albuterol (PROAIR HFA/PROVENTIL HFA/VENTOLIN HFA) 108 (90 Base) MCG/ACT inhaler, Inhale 2 puffs into the lungs every 4 hours as needed for shortness of breath / dyspnea or wheezing Inhale 1-2 Puffs every 4 hours as needed for Wheezing.  albuterol (PROVENTIL) (2.5 MG/3ML) 0.083% neb solution, Take 1 vial (2.5 mg) by nebulization every 4 hours as needed for shortness of breath or wheezing  apixaban ANTICOAGULANT (ELIQUIS) 5 MG tablet, Take 1 tablet (5 mg) by mouth 2 times daily  atorvastatin (LIPITOR) 20 MG tablet, Take 20 mg by mouth daily  clonazePAM (KLONOPIN) 0.5 MG tablet, Take 0.5 mg by mouth daily  cyclobenzaprine (FLEXERIL) 5 MG tablet, Take 1 tablet (5 mg) by mouth every 8 hours as needed for muscle spasms  diclofenac (VOLTAREN) 1 % topical gel, Apply 2 g topically 4 times daily  diltiazem ER COATED BEADS (CARDIZEM CD/CARTIA XT) 240 MG 24 hr capsule, Take 1 capsule (240 mg) by mouth daily  fluticasone-salmeterol (ADVAIR-HFA) 230-21 MCG/ACT inhaler, Inhale 2 puffs into the lungs 2 times daily  ipratropium - albuterol 0.5 mg/2.5 mg/3 mL (DUONEB) 0.5-2.5 (3) MG/3ML neb solution, Take 1 vial (3 mLs) by nebulization every 6 hours as needed for shortness of breath / dyspnea or wheezing  levothyroxine (SYNTHROID/LEVOTHROID) 112 MCG tablet, Take 112 mcg by mouth daily  losartan (COZAAR) 25 MG tablet, Take 1 tablet (25 mg) by mouth daily  nicotine (NICODERM CQ) 21 MG/24HR 24 hr patch, Place 1 patch onto the skin daily  PARoxetine (PAXIL) 20 MG tablet, Take 20 mg by mouth daily  predniSONE (DELTASONE) 20 MG tablet, Take 60 mg for 2 days, then 40 mg for 3 days, then 20 mg for 3 days, then 10 mg for 3 days, then stop  tiotropium (SPIRIVA RESPIMAT) 2.5  MCG/ACT inhaler, Inhale 2 puffs into the lungs daily as needed               Physical Examination:   Temp:  [97.4  F (36.3  C)-100  F (37.8  C)] 97.4  F (36.3  C)  Pulse:  [53-80] 65  Resp:  [10-26] 10  BP: ()/(49-81) 118/64  FiO2 (%):  [30 %] 30 %  SpO2:  [90 %-100 %] 100 %    Intake/Output Summary (Last 24 hours) at 6/26/2023 1215  Last data filed at 6/26/2023 1200  Gross per 24 hour   Intake 2195.19 ml   Output 4555 ml   Net -2359.81 ml     Wt Readings from Last 4 Encounters:   07/09/23 69.9 kg (154 lb 1.6 oz)   04/01/23 68.8 kg (151 lb 9.6 oz)   02/11/23 60 kg (132 lb 4.4 oz)   12/02/22 61.3 kg (135 lb 2.3 oz)     BP - Mean:  [] 85  Vent Mode: CPAP/PS  (Continuous positive airway pressure with Pressure Support)  FiO2 (%): 30 %  Resp Rate (Set): 18 breaths/min  Tidal Volume (Set, mL): 350 mL  PEEP (cm H2O): 8 cmH2O  Pressure Support (cm H2O): 10 cmH2O  Resp: 10    Recent Labs   Lab 07/08/23  0416 07/07/23  0440 07/06/23  2353 07/06/23  1802 07/04/23  1547 07/04/23  0738 07/03/23  0950   PH  --   --   --   --   --  7.55* 7.45   PCO2  --   --   --   --   --  69* 84*   PO2  --   --   --   --   --  83 54*   HCO3  --   --   --   --   --  60* 59*   O2PER 40 55 65 50   < > 40 40    < > = values in this interval not displayed.       GEN: no acute distress consistent with chemical sedation  HEENT: head ncat, sclera anicteric, OP patent, trachea midline   PULM: unlabored synchronous with vent, diffuse wheezes anteriorly but improving overall however wheezes increased this morning, chest tube in good position show signs of titling however no air leak  CV/COR: Irregular rate and rhythm S1S2 no gallop,  No rub, no murmur  ABD: soft nontender, hypoactive bowel sounds, no mass  EXT:  Edema   warm  NEURO: Does not follow commands, consistent with chemical sedation  SKIN: no obvious rash  LINES: clean, dry intact         Data:   All data and imaging reviewed     ROUTINE ICU LABS (Last four results)  CMP  Recent Labs    Lab 07/09/23 0443 07/09/23 0442 07/08/23  2341 07/08/23 2001 07/08/23  0800 07/08/23  0416 07/07/23  0822 07/07/23 0440 07/06/23  1608 07/06/23  1428 07/06/23  0804 07/06/23  0453     --   --   --   --  140  --  136  --   --   --  135*   POTASSIUM 3.8  --   --   --   --  4.1  --  3.6  --  4.1   < > 3.4   CHLORIDE 105  --   --   --   --  103  --  99  --   --   --  93*   CO2 29  --   --   --   --  30*  --  33*  --   --   --  36*   ANIONGAP 6*  --   --   --   --  7  --  4*  --   --   --  6*   * 128* 145* 142*   < > 144*  149*   < > 137*   < >  --    < > 158*   BUN 21.1  --   --   --   --  26.0*  --  28.0*  --   --   --  32.3*   CR 0.41*  --   --   --   --  0.42*  --  0.44*  --   --   --  0.50*   GFRESTIMATED >90  --   --   --   --  >90  --  >90  --   --   --  >90   EDIN 8.4*  --   --   --   --  8.0*  --  8.1*  --   --   --  8.5*   MAG 1.9  --   --   --   --  1.9  --  1.8  --   --   --  1.8   PHOS 2.5  --   --   --   --  3.0  --  2.7  --  2.3*  --  1.4*   PROTTOTAL 5.7*  --   --   --   --   --   --   --   --   --   --   --    ALBUMIN 2.9*  --   --   --   --   --   --   --   --   --   --   --    BILITOTAL 0.3  --   --   --   --   --   --   --   --   --   --   --    ALKPHOS 63  --   --   --   --   --   --   --   --   --   --   --    AST 25  --   --   --   --   --   --   --   --   --   --   --    ALT 78*  --   --   --   --   --   --   --   --   --   --   --     < > = values in this interval not displayed.     CBC  Recent Labs   Lab 07/09/23 0443 07/08/23 0416 07/07/23 0440 07/06/23  0453   WBC 7.1 6.3 8.3 10.6   RBC 2.35* 2.01* 2.23* 2.43*   HGB 7.6* 6.8* 7.6* 8.1*   HCT 23.6* 21.3* 22.9* 24.4*    106* 103* 100   MCH 32.3 33.8* 34.1* 33.3*   MCHC 32.2 31.9 33.2 33.2   RDW 18.5* 16.5* 15.7* 14.3   * 136* 154 151     INR  No lab results found in last 7 days.  Arterial Blood Gas  Recent Labs   Lab 07/08/23  0416 07/07/23  0440 07/06/23  2353 07/06/23  1802 07/04/23  1547 07/04/23  0738  07/03/23  0950   PH  --   --   --   --   --  7.55* 7.45   PCO2  --   --   --   --   --  69* 84*   PO2  --   --   --   --   --  83 54*   HCO3  --   --   --   --   --  60* 59*   O2PER 40 55 65 50   < > 40 40    < > = values in this interval not displayed.       All cultures:  No results for input(s): CULT in the last 168 hours.  Recent Results (from the past 24 hour(s))   XR Chest Port 1 View    Narrative    CHEST ONE VIEW  6/26/2023 9:29 AM     HISTORY: Increasing oxygen requirements and airway pressure.    COMPARISON: June 23, 2023      Impression    IMPRESSION: Endotracheal tube tip 4.3 cm from the stevie. Right PICC  line stable. Minimal pleural fluid or pleural thickening bilaterally  similar to previous. Small-to-moderate pneumothorax on the left new  from previous.    Results called to the patient's nurse on June 26, 2023 at 9:44 AM.     GHAZALA ORELLANA MD         SYSTEM ID:  [id number]         Billing: This patient is critically ill: yes. Total critical care time today 50 min.            Ghazala Mackey,   Surgical Critical Care Medicine

## 2023-07-09 NOTE — PROGRESS NOTES
[facility]  Hospitalist Progress Note  Miracle Charles MD 07/09/2023    Reason for Stay (Diagnosis): acute hypoxic respiratory failure          Assessment and Plan:      Summary of Stay: Lara Melendez is a 57 year old female with a history of COPD on prn home oxygen 2-3 L/nc, tobacco dependence, htn/hlp, PAF chronically on apixaban, anxiety, hypothyroidism, psoriasis admitted on 6/20/2023 with SOB and wheezing    Sx began a few days pta and she had called her pulmonologist who had initiated empiric azith/prednisone although sx persisted and worsened to the point the EMS was called and she was brought into the ER     Covid/flu/rsv negative  CXR with chronic SUSY infiltrate    She was admitted and placed on empiric abx/IV methylprednisolone    Admission notable to AF with RVR which converted to ST    The day after admission her respiratory status continued to decline to the point that she required intubation and mechanical ventilation on 6/21.  She had significant bronchospasm and high airway pressures requiring deep sedation with fentanyl/ketamine/propofol/vecuronium.  [facility] was contacted to assess for ECMO-but deemed not to be an appropriate candidate.  She was eventually able to wean off vec/ketamine after aggressive diuresis.     Repeat CXR on 6/26 revealed left ptx - > CT placed by IR.   She developed a fever 102 on 6/26 prompting initiation of clindamycin, but fevers persisted and wbc climbing so abx broadened to vanco and cefepime.  BCx remained negative but sputum culture grew out klebsiella oxytoca R to pip-tazo so abx switched over to meropenem and vanco discontinued due to negative MRSA screen    UCx ultimately grew out pseudomon   From a metabolic standpoint she was having progressive hypernatremia 2/2 TFs requiring free water with resolution.      On 6/30 She developed AF with RVR vs SVT not responsive to IV diltiazem and IV amiodarone and given associated soft BPs required DCCV -> NSR/ST.  IV  amio continued but still with ectomy with short runs of SVT further complicated by guppy breathing and deep sedation had to be resumed leading to to hypotension so started on phenylephrine. She required DCCV again on 7/2.  Ectopy now largely resolved and on oral amiodarone    Failed extubation of 7/6 so underwent trach placement at the bedside on 7/7.    Pulmonology consulted and recommended changing breo to [facility] and consider roflumalist or chronic azithromycin at discharge given frequent exacerbations      Problem List:   Acute on chronic hypoxic and hypercapnic respiratory failure  COPD with acute exacerbation chronically on 2-3 L/nc prn   L sided ptx  pna with sputum + for klebsiella oxytoca  Pulmonary edema 2/2 fluid resuscitation for sepsis  Complicated respiratory course-initially intubated due to progressive decline in respiratory function after admission-high airway pressures from severe bronchospasm required deep sedation including paralysis with vec.  She was also treated with 24 hours of continuous albuterol neb  Klebsiella oxytoca pna        --received 4 days azith->3 days of clinda->3 days cefepime/vanco-> 4 days of                                 meropenem/vanc->day 4 of cefepime.  Some overlap so on day 15 of abx         -- continues with some LGF in the  range.  6/26 CXR with left sided ptx         --CT placed by IR and removed 7/9  COPD w acute exacerbation         --methylpred 125 mg bid x 5 days-> 40 mg bid x 2 days -> 40 mg every day x 5 days then            stopped 7/5          -- continues on duonebs with levalbuterol given tachycardia qid          --pulmonary recs to change home breo to [facility] and consider anti exacerbation medication             Such as roflumalist or azith at discharge   Volume overload  Echo from 3/2023 hyperdynamic EF > 70 %- no comment on diastolic dysfunction   She did require a furosemide gtt along with periodic metolazone with excellent diuresis         -- now  off all diuretics:  I/O looks even, weights-up about 4 kg from admission   Failed extubation 7/6 so underwent trach placement 7/7  Will need LTAC for recovery     Paroxysmal AF/SVT  Pta on diltiazem and apixaban (BB being avoided due to severe COPD)  Required DCCV x 2 during this hospitalization.         -on amio, dilt 90 mg q 6 hours, and apixaban    Sepsis  Elevated lactate  Her hospital course has been peppered with episodic sepsis with leukocytosis/tachycardia/fever/respiratory failure etc  -resolved  -elevated lactate due to sepsis, and nebs     Hypotension   Did require transient phenylephrine support due to deep sedation     ? CAUTI  Hines cath placed for critical illness, UA 6/28 with inflammatory cells with large blood  UCx with  K with pseudomonas  -rec'd 2 days of liberty and now on day 4 of cefepime     Hyperkalemia  Potassium bola to 5.5 and persisted in that range for a day or 2.  She received several doses of Lokelma and potassium level has now normalized.  Unclear etiology.  Renal function is normal.  Her PTA losartan has been on hold    HypernatremiaSignificant rise in sodium up to 160 with tube feed initiation.  Improved with high-dose free water via NG and D5, but remained at about 150.  Sodium is now normalized after dose of metolazone and Lasix drip.  -  -BMP tomorrow    Hypertension pta on dilt and losartan  dilt back on and being titrated  Losartan discontinued in the setting of hyperkalemia    Anemia  Drifting down during hospitalization and now at 6.8-otherwise hemodynamically stable. No clear bleeding noted.  Suspect critical illness/venipuncture mediated  -transfuse single unit PRBC 7/8    Covid negative on admission  S/p 2 shot moderna series 5/2021      DVT Prophylaxis: DOAC  Code Status: Full Code  Functional Status:  Diet:  Hines: not in   Access PIVs    Disposition Plan   Expected discharge in tbd days to LTAC once bed found.     Entered: Miracle Charles MD 07/09/2023, 4:03 PM    "    Securely message with [employer] (more info)  Text page via [employer] Paging/Directory       I spent 25 minutes reviewing epic including prior labs/imaging/medical history and notes related to this encounter  In addition time was spent in interveiwing the patient, communicating with contacts, and medical decision making      Interval History (Subjective):      Thirsty but feeling much better compared with yesterday much calmer.  Breathing is ok                   Physical Exam:      Last Vital Signs:  /61   Pulse 73   Temp 97.4  F (36.3  C) (Temporal)   Resp 17   Ht 1.6 m (5' 3\")   Wt 69.9 kg (154 lb 1.6 oz)   SpO2 98%   BMI 27.30 kg/m      I/O:  Extubated, trach in place, much calmer, nad looks stated age, looks incredibly weak.  Lungs coarse without wheeze rrr no mrg no le edema, maybe trace anasarca belly protuberant and bloated but non tender skin warm and dry no cyanosis or clubbing affect is calm            Medications:      All current medications were reviewed with changes reflected in problem list.         Data:      All new lab and imaging data was reviewed.   Labs:  Recent Labs   Lab 07/09/23 1237 07/09/23 0443   NA  --  140   POTASSIUM  --  3.8   CHLORIDE  --  105   CO2  --  29   ANIONGAP  --  6*   * 139*   BUN  --  21.1   CR  --  0.41*   GFRESTIMATED  --  >90   EDIN  --  8.4*     Recent Labs   Lab 07/09/23 0443   WBC 7.1   HGB 7.6*   HCT 23.6*      *     Recent Labs   Lab 07/09/23  1237 07/09/23  0443 07/09/23 0442 07/08/23  2341 07/08/23 2001   * 139* 128* 145* 142*     Recent Labs   Lab 07/09/23 0443   AST 25   ALT 78*   ALKPHOS 63   BILITOTAL 0.3      Imaging:   Results for orders placed or performed during the hospital encounter of 06/20/23   XR Chest Port 1 View    Narrative    EXAM: XR CHEST PORT 1 VIEW  LOCATION: [facility]  DATE: 6/21/2023    INDICATION: COPD  COMPARISON: 03/20/2023      Impression    IMPRESSION: Stable chest. Again " seen is hyperinflation of both lungs. Chronic interstitial increased density in the upper left chest may represent aspirated barium or changes of calcification from old inflammation. Partially calcified thoracic aorta. No   inflammatory infiltrates or active CHF.   XR Chest Port 1 View    Narrative    XR CHEST PORT 1 VIEW 6/21/2023 12:32 PM    HISTORY: Endotracheal tube placement    COMPARISON: X-ray the same date at 0041 hours      Impression    IMPRESSION: The endotracheal tube tip is not well-seen but appears to  be approximately 7.6 cm above the stevie. Enteric tube with the distal  visualized portion below the diaphragm. No pneumothorax. No focal  pneumonic consolidation or pleural effusion. Stable clustered  calcified nodules in the left upper lobe. Normal heart size.    KAYLA HUANG MD         SYSTEM ID:  [id number]   XR Abdomen Port 1 View    Narrative    XR ABDOMEN PORT 1 VIEW 6/21/2023 12:33 PM    HISTORY: NG placement    COMPARISON: Chest x-ray the same date      Impression    IMPRESSION: Interval placement of an enteric section tube with the tip  and side-port in satisfactory position within the gastric lumen.  Nonobstructive bowel gas pattern. Right upper quadrant surgical clips.  Prominent vascular calcifications.    KAYLA HUANG MD         SYSTEM ID:  [id number]   XR Chest Port 1 View    Narrative    EXAM: XR CHEST PORT 1 VIEW  LOCATION: [facility]  DATE: 6/21/2023    INDICATION: check ETT position  COMPARISON: 06/21/2023      Impression    IMPRESSION: Endotracheal tube in good position 4 cm above the stevie. NG tube courses below the diaphragm. The lungs are clear.   XR Chest Port 1 View    Narrative    CHEST ONE VIEW  6/23/2023 8:37 AM     HISTORY: Worsening ABGs and respiratory status on vent.    COMPARISON: June 21, 2023      Impression    IMPRESSION: Endotracheal tube tip 5 cm from the stevie. Right-sided  PICC tip in the low SVC. Trace pleural fluid or pleural  thickening on  the left. No acute infiltrates. Calcifications at the left apex  stable.    GHAZALA ORELLANA MD         SYSTEM ID:  [id number]   XR Chest Port 1 View    Narrative    CHEST ONE VIEW  6/26/2023 9:29 AM     HISTORY: Increasing oxygen requirements and airway pressure.    COMPARISON: June 23, 2023      Impression    IMPRESSION: Endotracheal tube tip 4.3 cm from the stevie. Right PICC  line stable. Minimal pleural fluid or pleural thickening bilaterally  similar to previous. Small-to-moderate pneumothorax on the left new  from previous.    Results called to the patient's nurse on June 26, 2023 at 9:44 AM.     GHAZALA ORELLANA MD         SYSTEM ID:  [id number]   XR Chest Port 1 View    Narrative    CHEST ONE VIEW  6/26/2023 1:10 PM     HISTORY: Pneumothorax, followup.    COMPARISON: June 26, 2023 at 9:15 AM.      Impression    IMPRESSION: Small to moderate left pneumothorax is not significantly  changed since the comparison study. Right PICC unchanged. No acute  infiltrates. Enteric tube tip approximately 4.6 cm from the stevie.    GHAZALA ORELLANA MD         SYSTEM ID:  [id number]   CT Chest Tube with Cath Placement    Narrative    CT CHEST TUBE WITH CATH PLACEMENT  6/26/2023 3:14 PM     HISTORY:  Patient is intubated and has a spontaneous left  pneumothorax.    COMPARISON: Chest x-ray dated 6/26/2023    FINDINGS: After obtaining informed consent, the patient was placed in  a supine position on the CT table. The left anterior chest was prepped  and draped in the usual sterile manner. 1% lidocaine was injected for  local anesthesia. Under CT guidance, using a 5 Prydeinig Yueh needle,  access into the left pleural space was obtained. A wire was curled in  the pleural space. Over the wire, an 8 Prydeinig locking pigtail catheter  was placed. Air was suctioned out of the left thorax. Follow-up CT  scan showed interval decrease in size of the left pneumothorax. The  catheter was sutured to the patient's skin and hooked to a pleura  vac  suction apparatus.    The patient tolerated the procedure well. There were no immediate  postprocedure complications.      Impression    IMPRESSION: CT-guided left chest tube placed as above. Radiation dose  for this scan was reduced using automated exposure control, adjustment  of the mA and/or kV according to patient size, or iterative  reconstruction technique.    RODERICK EARL MD         SYSTEM ID:  [id number]   XR Chest Port 1 View    Narrative    CHEST PORTABLE 1 VIEW   6/27/2023 10:18 AM     HISTORY: Status post chest tube placement for pneumothorax.    COMPARISON: Chest x-ray on 6/26/2023.      Impression    IMPRESSION: Single AP view of the chest was obtained. Endotracheal  tube tip projects over the midthoracic trachea approximately 4 cm from  the stevie. Enteric tube crosses the diaphragm with distal tip outside  the field of view. Right upper extremity PICC tip projects over the  high right atrium. Cardiomediastinal silhouette is within normal  limits. Significant interval decrease/almost complete resolution of  the previously seen left pneumothorax status post chest tube  placement. Multiple small radiodensity projects over the left lung  apex, likely represent areas of calcification seen on prior chest CTs.  Otherwise, no suspicious focal pulmonary opacities. No significant  pleural effusion or pneumothorax.    TENZIN SHEPHERD MD         SYSTEM ID:  [id number]   XR Abdomen Port 1 View    Narrative    XR ABDOMEN PORT 1 VIEW   6/28/2023 4:28 PM     HISTORY: confirm placement of feeding tube    COMPARISON: Abdominal radiograph 6/21/2023      Impression    IMPRESSION: No radiographic evidence of bowel obstruction. Interval  placement of feeding tube with tip overlying the expected position of  the ligament of Treitz. New left basilar pulmonary opacity, could  represent atelectasis or developing airspace consolidation, consider  dedicated chest radiograph. Bones are unchanged.    BRYON MARSHALL MD          SYSTEM ID:  [id number]   XR Chest Port 1 View    Narrative    CHEST ONE VIEW  6/30/2023 12:23 PM     HISTORY: Increasing airway pressure with history of pneumothorax.    COMPARISON: June 27, 2023      Impression    IMPRESSION: Endotracheal tube tip 5.5 cm from the stevie. Right PICC  tip in the low SVC. No pneumothorax.    GHAZALA ORELLANA MD         SYSTEM ID:  [id number]   XR Chest Port 1 View    Narrative    EXAM: XR CHEST PORT 1 VIEW  LOCATION: [facility]  DATE: 7/2/2023    INDICATION: ETT placement  COMPARISON: None.      Impression    IMPRESSION: Endotracheal tube tip 5.4 cm above the stevie in good position. Heart size and mediastinum are unchanged. Right PICC line with tip in the distal SVC. Increased left lower lobe opacification suggesting increasing subsegmental atelectasis with   a left pleural effusion. No pneumothorax. Right lung is clear.   XR Chest Port 1 View    Narrative    EXAM: XR CHEST PORT 1 VIEW  LOCATION: [facility]  DATE: 7/4/2023    INDICATION: ventilator  COMPARISON: 07/02/2023      Impression    IMPRESSION: The ET tube is unchanged in position. The right PICC and enteric tube are unchanged in position. The heart is unchanged in size and contour. There is mild central pulmonary venous congestion. Mild interstitial edema seen in the lung bases   bilaterally.   XR Chest Port 1 View    Narrative    EXAM: XR CHEST PORT 1 VIEW  LOCATION: [facility]  DATE: 7/6/2023    INDICATION: Endotracheal tube positioning  COMPARISON: 07/04/2023      Impression    IMPRESSION: The ET tube and right PICC as well as the enteric tube are all unchanged in position. Left basilar subsegmental atelectasis is again noted, similar to prior exam.   XR Chest Port 1 View    Narrative    XR CHEST PORT 1 VIEW 7/7/2023 4:00 PM    HISTORY: Mechanical ventilation, paroxysmal atrial fibrillation,  tracheostomy tube placement    COMPARISON: X-ray 7/6/2023       Impression    IMPRESSION: Interval placement of a tracheostomy tube with the tip  located in satisfactory position 4.3 cm above the stevie. Stable  satisfactory right PICC line position. Enteric tube with the distal  visualized portion in the gastric lumen. Stable left-sided pleural  drainage catheter. No definite pneumothorax. Increased left basilar  pulmonary opacities which may reflect atelectasis or pneumonia. The  right lung is clear. Normal heart size. No vascular congestion.    KAYLA HUANG MD         SYSTEM ID:  [id number]

## 2023-07-09 NOTE — PROGRESS NOTES
"[facility] ICU VENTILATOR RESPIRATORY NOTE  Date of Admission: 6/20/2023  Date of Intubation (most recent): 7/6/2023  Reason for Mechanical Ventilation: Respiratory Failure  Number of Days on Mechanical Ventilation: 4  Met Criteria for Pressure Support Trial: Yes  Reason for No Pressure Support Trial: PS trial is on going (started @0510)    Vent Mode: CPAP/PS  (Continuous positive airway pressure with Pressure Support)  FiO2 (%): 30 %  Resp Rate (Set): 18 breaths/min  Tidal Volume (Set, mL): 350 mL  PEEP (cm H2O): 8 cmH2O  Pressure Support (cm H2O): 10 cmH2O  Resp: 13    Vital signs:  Temp: 97.4  F (36.3  C) Temp src: Temporal BP: 129/74 Pulse: 65   Resp: 13 SpO2: 100 % O2 Device: Mechanical Ventilator Oxygen Delivery: 50 LPM Height: 160 cm (5' 3\") Weight: 68.6 kg (151 lb 3.8 oz)  Estimated body mass index is 26.79 kg/m  as calculated from the following:    Height as of this encounter: 1.6 m (5' 3\").    Weight as of this encounter: 68.6 kg (151 lb 3.8 oz).    Venous Blood Gas  Recent Labs   Lab 07/08/23  0416 07/07/23  0440 07/06/23  2353 07/06/23  1802   PHV 7.43 7.42 7.35 7.46*   PCO2V 51* 55* 65* 53*   PO2V 37 38 39 36   HCO3V 34* 36* 36* 37*   SUSANNAH 8.6* 10.4* 9.3* 12.2*   O2PER 40 55 65 50             "

## 2023-07-09 NOTE — PROGRESS NOTES
[facility] Progress Note  ICU    Met with patient regarding staff  triaged request for visit.    She declined a visit at this time.    SHS remains available if further needs arise.    Rev. Mary Jane Marquez M.Div.  Staff   Phone  [phone]

## 2023-07-09 NOTE — PROGRESS NOTES
[facility] ICU VENTILATOR RESPIRATORY NOTE  Date of Admission:6/21/23  Date of intubation (most recent): 7/6/23, pt trached on 7/7/23  Reason for Mechanical Ventilation:  Respiratory Failure  Number of Days on Mechanical Ventilation: 4  Met Criteria for Pressure Support Trial: Yes  Length of Pressure Support Trial: Pt has been weaning since 5:10 this morning on 10/8 30% FIO2.  Pt looks great while weaning, and will continue to wean as she tolerates it.    Respiratory Therapy  Patient remains trached on mechanical ventilator with the following settings:    Vent Mode: CPAP/PS  (Continuous positive airway pressure with Pressure Support)  FiO2 (%): 30 %  Resp Rate (Set): 18 breaths/min  Tidal Volume (Set, mL): 350 mL  PEEP (cm H2O): 8 cmH2O  Pressure Support (cm H2O): 10 cmH2O  Resp: 20    Temp: 99.8  F (37.7  C) Temp src: Temporal BP: 133/64 Pulse: 72   Resp: 20 SpO2: 99 % O2 Device: Mechanical Ventilator      BS have been clear and diminished.  Will continue to monitor and support the patient's respiratory needs.    Cortney Martin,   7/9/2023 5:13 PM

## 2023-07-09 NOTE — PLAN OF CARE
Goal Outcome Evaluation:    Alert able to make needs known mouthing words. Trach with CPAP mode today shreyas well. Thick sputum suctioned via trach. Weak infrequent cough even with suctioning. TF at goal. Abd soft. Pos BS. El cath removed yesterday. Straight cath x 2. If unable to void this haja will replace el. Denies urge to void. Extreme deconditioning and gen weakness. Lifted to chair with sling/ ceiling lift.    El placed for cont retention. States does not have an urge and is not able to.

## 2023-07-09 NOTE — PROGRESS NOTES
07/09/23 1312   Appointment Info   Signing Clinician's Name / Credentials (OT) Pastora Rodriguez OTR/L   Living Environment   People in Home spouse   Current Living Arrangements house   Home Accessibility stairs to enter home;stairs within home   Transportation Anticipated family or friend will provide   Living Environment Comments Pt. lives with spouse in two floor home with stairs, walkin shower, comfort height toilet, and works part time.  Pt.'s spouse works full time.   Self-Care   Usual Activity Tolerance good   Current Activity Tolerance poor   Equipment Currently Used at Home none   Fall history within last six months no   Activity/Exercise/Self-Care Comment Pt. was independent at baseline with ADLs and IADLs prior to admit.   General Information   Onset of Illness/Injury or Date of Surgery 07/06/23   Referring Physician Kody Mackey MD   Patient/Family Therapy Goal Statement (OT) would like to get back to normal and open to rehab   Additional Occupational Profile Info/Pertinent History of Current Problem 57-year-old female with history of COPD, emphysema, O2 dependent at home, tobacco dependence, hypertension, anxiety, atrial fibrillation on Eliquis, hypothyroidism presents with worsening shortness of breath and wheezing.  She appears to be in status asthmaticus versus worsening COPD exacerbation.  Patient was prescribed azithromycin and prednisone prior to admission.  She continued decline was brought to EMS for evaluation.  Where she was given IV steroids, IV magnesium nebulizers and BiPAP.  After admission to the ICU she failed a BiPAP trial and required intubation mechanical ventilation.  She had significant bronchospasm with high airway pressures requiring deep sedation with fentanyl ketamine and propofol and addition of a paralytic.  Events of last 24 hours noted for episodes of RVR with associated atrial fibrillation and SVT.  Patient was cardioverted back into sinus rhythm with  frequent PACs. .  She was weaned off vecuronium.  She did have an episode of A-fib with RVR with relative soft pressures for which she underwent a cardioversion which converted her to atrial fib with rate control.   Existing Precautions/Restrictions oxygen therapy device and L/min  (NG tube, chest tube, trach)   General Observations and Info Pt. up in recliner with feet elevated and chair reclined.   Cognitive Status Examination   Orientation Status orientation to person, place and time   Cognitive Status Comments Will monitor cognition.  Pt. able to nod head appropriately yes/no.   Visual Perception   Visual Impairment/Limitations corrective lenses full-time   Pain Assessment   Patient Currently in Pain Yes, see Vital Sign flowsheet   Range of Motion Comprehensive   Comment, General Range of Motion limited due to chest tube, trach, lines   Strength Comprehensive (MMT)   Comment, General Manual Muscle Testing (MMT) Assessment 3+/5 B UE, fatigues quickly   Coordination   Upper Extremity Coordination Left UE impaired;Right UE impaired   Coordination Comments pt. unable to fully squeeze B hands and lift B UE in order to complete ADLs   Transfers   Transfer Comments Liko lift   Activities of Daily Living   BADL Assessment/Intervention   (full assist)   Clinical Impression   Criteria for Skilled Therapeutic Interventions Met (OT) Yes, treatment indicated   OT Diagnosis impaired independence and tolerance for ADLs   OT Problem List-Impairments impacting ADL problems related to;activity tolerance impaired;range of motion (ROM);strength;pain;mobility   Assessment of Occupational Performance 5 or more Performance Deficits   Identified Performance Deficits decreased functional transfers, decreased standing tolerance, decreased total body dressing, decreased G/H, decreased reaching and gathering items for ADLs   Planned Therapy Interventions (OT) ADL retraining;ROM;strengthening;transfer training;progressive  activity/exercise;risk factor education   Clinical Decision Making Complexity (OT) moderate complexity   Anticipated Equipment Needs Upon Discharge (OT) gait belt;hospital bed;lift device;commode chair;shower chair;walker, standard   Risk & Benefits of therapy have been explained evaluation/treatment results reviewed;care plan/treatment goals reviewed;risks/benefits reviewed;current/potential barriers reviewed;participants voiced agreement with care plan;participants included;patient;spouse/significant other   OT Total Evaluation Time   OT Eval, Moderate Complexity Minutes (06945) 10   OT Goals   Therapy Frequency (OT) 5 times/wk   OT Predicted Duration/Target Date for Goal Attainment 07/15/23   OT Goals Hygiene/Grooming;Transfers;Aerobic Activity;Lower Body Dressing   OT: Hygiene/Grooming moderate assist;from wheelchair   OT: Lower Body Dressing Moderate assist;using adaptive equipment;from wheelchair   OT: Transfer Moderate assist;with assistive device   OT: Perform aerobic activity with stable cardiovascular response intermittent activity;10 minutes   Therapeutic Procedures/Exercise   Therapeutic Procedure: strength, endurance, ROM, flexibillity minutes (67293) 14   Symptoms Noted During/After Treatment fatigue   Treatment Detail/Skilled Intervention Pt. seated in recliner chair and presenting with decreased strength and ROM B UE.  OT provided positioning and assist for B UE AAROM hand, elbow, and up to shoulder height.  Pt. able to tolerate 1 set of 7 exercises for 10 reps each.  OT provided yellow foam block and encouraged pt. to attempt to squeeze block throughout the day with B hands.  NSG present at end of session.   OT Discharge Planning   OT Plan Liko lift up to chair, seated B UE AAROM, assist simple G/H tasks   OT Discharge Recommendation (DC Rec) LTACH-pending meets medical criteria   OT Rationale for DC Rec Pt. demonstrates significantly below baseline independence and tolerance for all ADLs.  Pt.  lives with spouse in house with stairs and currently requiring Liko lift for transfers.  Recommend continued IP OT for ADL training, strengthening/ROM, and LTACH for continued therapy.   OT Brief overview of current status Liko lift to chair, able to tolerate 1 set of B UE AAROM up to shoulder height only

## 2023-07-10 ENCOUNTER — APPOINTMENT (OUTPATIENT)
Dept: OCCUPATIONAL THERAPY | Facility: CLINIC | Age: 58
End: 2023-07-10
Payer: COMMERCIAL

## 2023-07-10 ENCOUNTER — APPOINTMENT (OUTPATIENT)
Dept: GENERAL RADIOLOGY | Facility: CLINIC | Age: 58
End: 2023-07-10
Attending: ANESTHESIOLOGY
Payer: COMMERCIAL

## 2023-07-10 ENCOUNTER — APPOINTMENT (OUTPATIENT)
Dept: PHYSICAL THERAPY | Facility: CLINIC | Age: 58
End: 2023-07-10
Attending: SURGERY
Payer: COMMERCIAL

## 2023-07-10 ENCOUNTER — APPOINTMENT (OUTPATIENT)
Dept: GENERAL RADIOLOGY | Facility: CLINIC | Age: 58
End: 2023-07-10
Attending: INTERNAL MEDICINE
Payer: COMMERCIAL

## 2023-07-10 LAB
ANION GAP SERPL CALCULATED.3IONS-SCNC: 6 MMOL/L (ref 7–15)
BUN SERPL-MCNC: 19.4 MG/DL (ref 8–23)
CALCIUM SERPL-MCNC: 8.3 MG/DL (ref 8.6–10)
CHLORIDE SERPL-SCNC: 104 MMOL/L (ref 98–107)
CREAT SERPL-MCNC: 0.4 MG/DL (ref 0.51–0.95)
DEPRECATED HCO3 PLAS-SCNC: 29 MMOL/L (ref 22–29)
ERYTHROCYTE [DISTWIDTH] IN BLOOD BY AUTOMATED COUNT: 18.5 % (ref 10–15)
GFR SERPL CREATININE-BSD FRML MDRD: >90 ML/MIN/1.73M2
GLUCOSE BLDC GLUCOMTR-MCNC: 104 MG/DL (ref 70–99)
GLUCOSE BLDC GLUCOMTR-MCNC: 106 MG/DL (ref 70–99)
GLUCOSE BLDC GLUCOMTR-MCNC: 118 MG/DL (ref 70–99)
GLUCOSE BLDC GLUCOMTR-MCNC: 122 MG/DL (ref 70–99)
GLUCOSE BLDC GLUCOMTR-MCNC: 126 MG/DL (ref 70–99)
GLUCOSE BLDC GLUCOMTR-MCNC: 131 MG/DL (ref 70–99)
GLUCOSE BLDC GLUCOMTR-MCNC: 142 MG/DL (ref 70–99)
GLUCOSE SERPL-MCNC: 135 MG/DL (ref 70–99)
HCT VFR BLD AUTO: 24.2 % (ref 35–47)
HGB BLD-MCNC: 7.6 G/DL (ref 11.7–15.7)
MAGNESIUM SERPL-MCNC: 2 MG/DL (ref 1.7–2.3)
MCH RBC QN AUTO: 32.5 PG (ref 26.5–33)
MCHC RBC AUTO-ENTMCNC: 31.4 G/DL (ref 31.5–36.5)
MCV RBC AUTO: 103 FL (ref 78–100)
PHOSPHATE SERPL-MCNC: 2.8 MG/DL (ref 2.5–4.5)
PLATELET # BLD AUTO: 151 10E3/UL (ref 150–450)
POTASSIUM SERPL-SCNC: 4 MMOL/L (ref 3.4–5.3)
RBC # BLD AUTO: 2.34 10E6/UL (ref 3.8–5.2)
SODIUM SERPL-SCNC: 139 MMOL/L (ref 136–145)
WBC # BLD AUTO: 7.2 10E3/UL (ref 4–11)

## 2023-07-10 PROCEDURE — 999N000253 HC STATISTIC WEANING TRIALS

## 2023-07-10 PROCEDURE — 94640 AIRWAY INHALATION TREATMENT: CPT

## 2023-07-10 PROCEDURE — 250N000011 HC RX IP 250 OP 636: Mod: JZ | Performed by: INTERNAL MEDICINE

## 2023-07-10 PROCEDURE — 250N000009 HC RX 250: Performed by: SURGERY

## 2023-07-10 PROCEDURE — 99291 CRITICAL CARE FIRST HOUR: CPT | Performed by: ANESTHESIOLOGY

## 2023-07-10 PROCEDURE — 97530 THERAPEUTIC ACTIVITIES: CPT | Mod: GP | Performed by: PHYSICAL THERAPIST

## 2023-07-10 PROCEDURE — 999N000157 HC STATISTIC RCP TIME EA 10 MIN

## 2023-07-10 PROCEDURE — 80048 BASIC METABOLIC PNL TOTAL CA: CPT | Performed by: INTERNAL MEDICINE

## 2023-07-10 PROCEDURE — 250N000013 HC RX MED GY IP 250 OP 250 PS 637: Performed by: SURGERY

## 2023-07-10 PROCEDURE — 85027 COMPLETE CBC AUTOMATED: CPT | Performed by: INTERNAL MEDICINE

## 2023-07-10 PROCEDURE — 94003 VENT MGMT INPAT SUBQ DAY: CPT

## 2023-07-10 PROCEDURE — 250N000009 HC RX 250: Performed by: INTERNAL MEDICINE

## 2023-07-10 PROCEDURE — 250N000013 HC RX MED GY IP 250 OP 250 PS 637: Performed by: HOSPITALIST

## 2023-07-10 PROCEDURE — 99232 SBSQ HOSP IP/OBS MODERATE 35: CPT | Performed by: INTERNAL MEDICINE

## 2023-07-10 PROCEDURE — 250N000013 HC RX MED GY IP 250 OP 250 PS 637: Performed by: INTERNAL MEDICINE

## 2023-07-10 PROCEDURE — 250N000013 HC RX MED GY IP 250 OP 250 PS 637: Performed by: ANESTHESIOLOGY

## 2023-07-10 PROCEDURE — 97110 THERAPEUTIC EXERCISES: CPT | Mod: GP | Performed by: PHYSICAL THERAPIST

## 2023-07-10 PROCEDURE — 71045 X-RAY EXAM CHEST 1 VIEW: CPT

## 2023-07-10 PROCEDURE — 200N000001 HC R&B ICU

## 2023-07-10 PROCEDURE — 84100 ASSAY OF PHOSPHORUS: CPT | Performed by: INTERNAL MEDICINE

## 2023-07-10 PROCEDURE — 94640 AIRWAY INHALATION TREATMENT: CPT | Mod: 76

## 2023-07-10 PROCEDURE — 74019 RADEX ABDOMEN 2 VIEWS: CPT

## 2023-07-10 PROCEDURE — 83735 ASSAY OF MAGNESIUM: CPT | Performed by: INTERNAL MEDICINE

## 2023-07-10 PROCEDURE — 97110 THERAPEUTIC EXERCISES: CPT | Mod: GO

## 2023-07-10 PROCEDURE — 97162 PT EVAL MOD COMPLEX 30 MIN: CPT | Mod: GP | Performed by: PHYSICAL THERAPIST

## 2023-07-10 PROCEDURE — 71045 X-RAY EXAM CHEST 1 VIEW: CPT | Mod: 77

## 2023-07-10 RX ORDER — AMOXICILLIN 250 MG
2 CAPSULE ORAL 2 TIMES DAILY
Status: DISCONTINUED | OUTPATIENT
Start: 2023-07-10 | End: 2023-07-14

## 2023-07-10 RX ORDER — MAGNESIUM SULFATE HEPTAHYDRATE 40 MG/ML
2 INJECTION, SOLUTION INTRAVENOUS ONCE
Status: COMPLETED | OUTPATIENT
Start: 2023-07-10 | End: 2023-07-10

## 2023-07-10 RX ORDER — BUDESONIDE 0.5 MG/2ML
0.5 INHALANT ORAL 2 TIMES DAILY
Status: DISCONTINUED | OUTPATIENT
Start: 2023-07-10 | End: 2023-07-28 | Stop reason: HOSPADM

## 2023-07-10 RX ORDER — SIMETHICONE 40MG/0.6ML
133 SUSPENSION, DROPS(FINAL DOSAGE FORM)(ML) ORAL 3 TIMES DAILY
Status: DISPENSED | OUTPATIENT
Start: 2023-07-10 | End: 2023-07-12

## 2023-07-10 RX ORDER — BISACODYL 10 MG
10 SUPPOSITORY, RECTAL RECTAL DAILY PRN
Status: DISCONTINUED | OUTPATIENT
Start: 2023-07-10 | End: 2023-07-28 | Stop reason: HOSPADM

## 2023-07-10 RX ORDER — LACTULOSE 10 G/15ML
20 SOLUTION ORAL 2 TIMES DAILY
Status: DISCONTINUED | OUTPATIENT
Start: 2023-07-10 | End: 2023-07-14

## 2023-07-10 RX ORDER — MAGNESIUM HYDROXIDE/ALUMINUM HYDROXICE/SIMETHICONE 120; 1200; 1200 MG/30ML; MG/30ML; MG/30ML
30 SUSPENSION ORAL EVERY 4 HOURS PRN
Status: DISCONTINUED | OUTPATIENT
Start: 2023-07-10 | End: 2023-07-28 | Stop reason: HOSPADM

## 2023-07-10 RX ADMIN — OXYCODONE HYDROCHLORIDE 5 MG: 5 SOLUTION ORAL at 12:35

## 2023-07-10 RX ADMIN — DILTIAZEM HYDROCHLORIDE 60 MG: 30 TABLET, FILM COATED ORAL at 03:46

## 2023-07-10 RX ADMIN — APIXABAN 5 MG: 5 TABLET, FILM COATED ORAL at 11:35

## 2023-07-10 RX ADMIN — IPRATROPIUM BROMIDE 0.5 MG: 0.5 SOLUTION RESPIRATORY (INHALATION) at 12:08

## 2023-07-10 RX ADMIN — ONDANSETRON 4 MG: 2 INJECTION INTRAMUSCULAR; INTRAVENOUS at 16:18

## 2023-07-10 RX ADMIN — OXYCODONE HYDROCHLORIDE 5 MG: 5 SOLUTION ORAL at 19:53

## 2023-07-10 RX ADMIN — LEVALBUTEROL HYDROCHLORIDE 0.63 MG: 0.63 SOLUTION RESPIRATORY (INHALATION) at 07:35

## 2023-07-10 RX ADMIN — MENTHOL 1 PATCH: 205.5 PATCH TOPICAL at 09:37

## 2023-07-10 RX ADMIN — LEVOTHYROXINE SODIUM 112 MCG: 0.11 TABLET ORAL at 09:08

## 2023-07-10 RX ADMIN — BUDESONIDE 0.5 MG: 0.5 INHALANT ORAL at 20:23

## 2023-07-10 RX ADMIN — OXYCODONE HYDROCHLORIDE 5 MG: 5 SOLUTION ORAL at 00:02

## 2023-07-10 RX ADMIN — OXYCODONE HYDROCHLORIDE 5 MG: 5 SOLUTION ORAL at 03:46

## 2023-07-10 RX ADMIN — AMIODARONE HYDROCHLORIDE 400 MG: 200 TABLET ORAL at 09:08

## 2023-07-10 RX ADMIN — SENNOSIDES AND DOCUSATE SODIUM 2 TABLET: 50; 8.6 TABLET ORAL at 11:35

## 2023-07-10 RX ADMIN — BUDESONIDE 0.5 MG: 0.5 INHALANT ORAL at 12:08

## 2023-07-10 RX ADMIN — LEVALBUTEROL HYDROCHLORIDE 0.63 MG: 0.63 SOLUTION RESPIRATORY (INHALATION) at 20:23

## 2023-07-10 RX ADMIN — LEVALBUTEROL HYDROCHLORIDE 0.63 MG: 0.63 SOLUTION RESPIRATORY (INHALATION) at 12:08

## 2023-07-10 RX ADMIN — Medication 40 MG: at 09:08

## 2023-07-10 RX ADMIN — IPRATROPIUM BROMIDE 0.5 MG: 0.5 SOLUTION RESPIRATORY (INHALATION) at 20:23

## 2023-07-10 RX ADMIN — ALUMINUM HYDROXIDE, MAGNESIUM HYDROXIDE, AND DIMETHICONE 30 ML: 200; 20; 200 SUSPENSION ORAL at 11:35

## 2023-07-10 RX ADMIN — MAGNESIUM SULFATE HEPTAHYDRATE 2 G: 2 INJECTION, SOLUTION INTRAVENOUS at 06:14

## 2023-07-10 RX ADMIN — IPRATROPIUM BROMIDE 0.5 MG: 0.5 SOLUTION RESPIRATORY (INHALATION) at 16:25

## 2023-07-10 RX ADMIN — SIMETHICONE 133 MG: 20 EMULSION ORAL at 22:44

## 2023-07-10 RX ADMIN — LACTULOSE 20 G: 20 SOLUTION ORAL at 11:35

## 2023-07-10 RX ADMIN — SIMETHICONE 133 MG: 20 EMULSION ORAL at 17:50

## 2023-07-10 RX ADMIN — LACTULOSE 20 G: 20 SOLUTION ORAL at 20:00

## 2023-07-10 RX ADMIN — LEVALBUTEROL HYDROCHLORIDE 0.63 MG: 0.63 SOLUTION RESPIRATORY (INHALATION) at 16:25

## 2023-07-10 RX ADMIN — AMIODARONE HYDROCHLORIDE 400 MG: 200 TABLET ORAL at 20:00

## 2023-07-10 RX ADMIN — NICOTINE 1 PATCH: 14 PATCH, EXTENDED RELEASE TRANSDERMAL at 09:21

## 2023-07-10 RX ADMIN — ATORVASTATIN CALCIUM 20 MG: 20 TABLET, FILM COATED ORAL at 09:08

## 2023-07-10 RX ADMIN — SENNOSIDES AND DOCUSATE SODIUM 2 TABLET: 50; 8.6 TABLET ORAL at 20:00

## 2023-07-10 RX ADMIN — IPRATROPIUM BROMIDE 0.5 MG: 0.5 SOLUTION RESPIRATORY (INHALATION) at 07:35

## 2023-07-10 RX ADMIN — DILTIAZEM HYDROCHLORIDE 60 MG: 30 TABLET, FILM COATED ORAL at 16:00

## 2023-07-10 RX ADMIN — APIXABAN 5 MG: 5 TABLET, FILM COATED ORAL at 20:01

## 2023-07-10 RX ADMIN — DILTIAZEM HYDROCHLORIDE 60 MG: 30 TABLET, FILM COATED ORAL at 20:00

## 2023-07-10 RX ADMIN — PAROXETINE 20 MG: 10 SUSPENSION ORAL at 09:08

## 2023-07-10 RX ADMIN — OXYCODONE HYDROCHLORIDE 5 MG: 5 SOLUTION ORAL at 16:00

## 2023-07-10 RX ADMIN — DEXMEDETOMIDINE HYDROCHLORIDE 0.2 MCG/KG/HR: 400 INJECTION INTRAVENOUS at 09:43

## 2023-07-10 RX ADMIN — Medication 15 ML: at 09:08

## 2023-07-10 RX ADMIN — DILTIAZEM HYDROCHLORIDE 60 MG: 30 TABLET, FILM COATED ORAL at 09:08

## 2023-07-10 RX ADMIN — OXYCODONE HYDROCHLORIDE 5 MG: 5 SOLUTION ORAL at 07:57

## 2023-07-10 ASSESSMENT — ACTIVITIES OF DAILY LIVING (ADL)
ADLS_ACUITY_SCORE: 34
ADLS_ACUITY_SCORE: 38
ADLS_ACUITY_SCORE: 38
ADLS_ACUITY_SCORE: 34
ADLS_ACUITY_SCORE: 38
ADLS_ACUITY_SCORE: 36
ADLS_ACUITY_SCORE: 34
ADLS_ACUITY_SCORE: 36
ADLS_ACUITY_SCORE: 38
ADLS_ACUITY_SCORE: 38

## 2023-07-10 NOTE — PROGRESS NOTES
ICU End of Shift Summary.  For vital signs and complete assessments, please see documentation flowsheets.     Pertinent assessments: Patient alert and oriented. Remains on trach. On cpap setting on vent. Has neck and abd pain- on scheduled oxy. Lactulose and senna given- no bowel movement yet. Nausea present but no emesis- prn zofran given .Icy hot patch added today and applied. Tube feedings on hold. Remains in NSR.   Major Shift Events: chest tube removed, able to get up into chair with lift, worked with PT, dex stopped, tube feedings on hold till bowel function returns, mag replaced  Plan (Upcoming Events): on scheduled nebs, resume tube feeding once return of bowel function, therapy and PT following, continue droplet precautions   Discharge/Transfer Needs: wean trach     Bedside Shift Report Completed : y  Bedside Safety Check Completed: tomy

## 2023-07-10 NOTE — PROGRESS NOTES
07/10/23 1300   Appointment Info   Signing Clinician's Name / Credentials (PT) Ladi Ly DPT   Living Environment   People in Home spouse   Current Living Arrangements house   Home Accessibility stairs to enter home;stairs within home   Transportation Anticipated family or friend will provide   Living Environment Comments Pt lives in home with spouse   Self-Care   Usual Activity Tolerance good   Current Activity Tolerance poor   Equipment Currently Used at Home none   Fall history within last six months no   Activity/Exercise/Self-Care Comment Pt. was independent at baseline with ADLs and IADLs prior to admit.   General Information   Onset of Illness/Injury or Date of Surgery 06/21/23   Referring Physician Kody Mackey DO   Patient/Family Therapy Goals Statement (PT) Pt is agreeable to PT   Pertinent History of Current Problem (include personal factors and/or comorbidities that impact the POC) 57-year-old female with history of COPD, emphysema, O2 dependent at home, tobacco dependence, hypertension, anxiety, atrial fibrillation on Eliquis, hypothyroidism presented with worsening shortness of breath and wheezing after having been prescribed azithromycin and prednisone prior to admission. COPD Exacerbation vs Status Asthmaticus in the ED,given IV steroids, IV magnesium nebulizers and BiPAP.  Admitted to the ICU, failed BiPAP and was intubated.  She had significant bronchospasm with high airway pressures requiring deep sedation with fentanyl, ketamine and propofol and finally a paralytic.  Has had several episodes of Afib with RVR (known history) for which she was cardioverted on 7/8 and again on 7/9.  Has been weaned off of paralytic, but failed extubation and was trached at bedside on 7/7 (sutures to be removed 7/12) and has since been slowly improving, however BAL on 7/7 was positive for Rhinovirus so patient remains on droplet precautions.   Now ready for LTACH placement.   Existing  Precautions/Restrictions fall   General Observations Pt has CT, feeding tube, trach with vent on bipap settings. Pt able to communicate with yes/no questions   Cognition   Affect/Mental Status (Cognition) WNL   Follows Commands (Cognition) follows one-step commands   Pain Assessment   Patient Currently in Pain Yes, see Vital Sign flowsheet  (neck and abdominal pain)   Posture    Posture Forward head position;Protracted shoulders   Range of Motion (ROM)   ROM Comment limited DF ROM B LEs, pt has rooke boots   Strength (Manual Muscle Testing)   Strength (Manual Muscle Testing) MMT: Hip;MMT: Ankle;MMT: Knee   Strength Comments limited in all planes brigid UEs.   Left Hip (Manual Muscle Testing)   Hip Flexion - Left Side (2+/5) poor plus, left   Hip ABduction - Left Side (2+/5) poor plus, left   Hip ADduction - Left Side (2+/5) poor plus, left   Right Hip (Manual Muscle Testing)   Hip Flexion - Right Side (2+/5) poor plus, right   Hip Extension - Right Side (2+/5) poor plus, right   Hip ADduction - Right Side (2+/5) poor plus, right   Left Knee (Manual Muscle Testing)   Knee Flexion - Left Side (3-/5) fair minus, left   Knee Extension - Left Side (3-/5) fair minus, left   Right Knee (Manual Muscle Testing)   Knee Flexion - Right Side (3-/5) fair minus, right   Knee Extension - Right Side (3-/5) fair minus, right   Left Ankle/Foot (Manual Muscle Testing)   Ankle Dorsiflexion - Left Side 1/5) trace, left   Right Ankle/Foot (Manual Muscle Testing)   Ankle Dorsiflexion - Right Side 1/5) trace, right   Bed Mobility   Comment, (Bed Mobility) use of lift to assess trunk control due to significant weakness, needing max/dependent A for sitting EOB   Transfers   Comment, (Transfers) lift dependent   Muscle Tone   Muscle Tone Comments increased tone in DF   Clinical Impression   Criteria for Skilled Therapeutic Intervention Yes, treatment indicated   PT Diagnosis (PT) decreased functional mobility   Influenced by the following  impairments decreased strength, decreased ROM   Functional limitations due to impairments decreased transfers, bed mob, ambulation   Clinical Presentation (PT Evaluation Complexity) Evolving/Changing   Clinical Presentation Rationale significant neuromuscular deficits as well as CV deficits   Clinical Decision Making (Complexity) moderate complexity   Planned Therapy Interventions (PT) balance training;bed mobility training;gait training;home exercise program;orthotic fitting/training;patient/family education;strengthening;stretching;transfer training;progressive activity/exercise;risk factor education;home program guidelines   Anticipated Equipment Needs at Discharge (PT)   (defer to next DC dispo, but could have significant equipment needs)   Risk & Benefits of therapy have been explained evaluation/treatment results reviewed;care plan/treatment goals reviewed;risks/benefits reviewed;current/potential barriers reviewed;participants included;participants voiced agreement with care plan  (not able to voice agreement but able to answer yes/no questions)   PT Total Evaluation Time   PT Jerial, Moderate Complexity Minutes (20088) 5   Physical Therapy Goals   PT Frequency 5x/week   PT Predicted Duration/Target Date for Goal Attainment 07/15/23   PT Goals Bed Mobility;Transfers;Gait   PT: Bed Mobility Moderate assist;Supine to/from sit   PT: Transfers Moderate assist;Sit to/from stand;Bed to/from chair;Assistive device  (SS)   Interventions   Interventions Quick Adds Therapeutic Procedure;Therapeutic Activity   Therapeutic Procedure/Exercise   Ther. Procedure: strength, endurance, ROM, flexibillity Minutes (19838) 15   Symptoms Noted During/After Treatment fatigue;increased pain   Treatment Detail/Skilled Intervention Pt was cued for attempts at AAROM in L LE, pt becoming tired following L LE and requesting to rest DF/PF, ankle circles, hip flexion with knee flexion, SAQ, SLR, Hip abd/add x 10 each   Therapeutic Activity    Therapeutic Activities: dynamic activities to improve functional performance Minutes (96451) 40   Treatment Detail/Skilled Intervention Increased time spent for room set up, cord management with help of RNs, 3 people total needed. Pt was cued for rolling with max A for LE placement in order to faciliate very minimal LE pushes, mAX A x 2- 3/dependent for rolling, but attempting to follow all cues for gripping and reaching(little to no functional movement in B UEs), pt was cued for neck flexion during sling lift after donned. Pt was cued for sitting up EOB with good posture via lift sling. Decreased tension and was able to sit with max A x 2. Cues for wt shifting at EOB, needing manual cues for this, some balance reactions present. Pt lifted to recliner, cued for wt shifting once in chair, L and R needing max A and back rest for attempts.   PT Discharge Planning   PT Plan Sitting EOB with sling   PT Discharge Recommendation (DC Rec) LTACH, pending meets medical criteria   PT Rationale for DC Rec Per chart meets med needs for this. Pt highly inde prior to admit, very below baseline and will need significant rehab to improve all mobility   PT Brief overview of current status Pt able to sit on EOB with max/dependent A from lift and therapist

## 2023-07-10 NOTE — PROGRESS NOTES
"[facility] ICU VENTILATOR RESPIRATORY NOTE  Date of Admission: 6/20/2023  Date of Intubation (most recent): 7/6/2023  Reason for Mechanical Ventilation: Respiratory Failure  Number of Days on Mechanical Ventilation: 5  Met Criteria for Pressure Support Trial: Yes  Reason for No Pressure Support Trial: PS trial is on going (started @0409)    Vent Mode: (S) CPAP/PS  (Continuous positive airway pressure with Pressure Support)  FiO2 (%): 30 %  Resp Rate (Set): 18 breaths/min  Tidal Volume (Set, mL): 350 mL  PEEP (cm H2O): 8 cmH2O  Pressure Support (cm H2O): 10 cmH2O  Resp: 17    Vital signs:  Temp: 97.4  F (36.3  C) Temp src: Temporal BP: 107/66 Pulse: 58   Resp: 17 SpO2: 99 % O2 Device: Mechanical Ventilator Oxygen Delivery: 50 LPM Height: 160 cm (5' 3\") Weight: 69.9 kg (154 lb 1.6 oz)  Estimated body mass index is 27.3 kg/m  as calculated from the following:    Height as of this encounter: 1.6 m (5' 3\").    Weight as of this encounter: 69.9 kg (154 lb 1.6 oz).        Venous Blood Gas  Recent Labs   Lab 07/08/23  0416 07/07/23  0440 07/06/23  2353 07/06/23  1802   PHV 7.43 7.42 7.35 7.46*   PCO2V 51* 55* 65* 53*   PO2V 37 38 39 36   HCO3V 34* 36* 36* 37*   SUSANNAH 8.6* 10.4* 9.3* 12.2*   O2PER 40 55 65 50       "

## 2023-07-10 NOTE — PROGRESS NOTES
[facility]  Hospitalist Progress Note  Miracle Charles MD 07/10/2023    Reason for Stay (Diagnosis): acute hypoxic respiratory failure          Assessment and Plan:      Summary of Stay: Lara Melendez is a 57 year old female with a history of COPD on prn home oxygen 2-3 L/nc, tobacco dependence, htn/hlp, PAF chronically on apixaban, anxiety, hypothyroidism, psoriasis admitted on 6/20/2023 with SOB and wheezing    Sx began a few days pta and she had called her pulmonologist who had initiated empiric azith/prednisone although sx persisted and worsened to the point the EMS was called and she was brought into the ER     Covid/flu/rsv negative  CXR with chronic SUSY infiltrate    She was admitted and placed on empiric abx/IV methylprednisolone    Admission notable to AF with RVR which converted to ST    The day after admission her respiratory status continued to decline to the point that she required intubation and mechanical ventilation on 6/21.  She had significant bronchospasm and high airway pressures requiring deep sedation with fentanyl/ketamine/propofol/vecuronium.  [facility] was contacted to assess for ECMO-but deemed not to be an appropriate candidate.  She was eventually able to wean off vec/ketamine after aggressive diuresis.     Repeat CXR on 6/26 revealed left ptx - > CT placed by IR.   She developed a fever 102 on 6/26 prompting initiation of clindamycin, but fevers persisted and wbc climbing so abx broadened to vanco and cefepime.  BCx remained negative but sputum culture grew out klebsiella oxytoca R to pip-tazo so abx switched over to meropenem and vanco discontinued due to negative MRSA screen    UCx ultimately grew out pseudomon   From a metabolic standpoint she was having progressive hypernatremia 2/2 TFs requiring free water with resolution.      On 6/30 She developed AF with RVR vs SVT not responsive to IV diltiazem and IV amiodarone and given associated soft BPs required DCCV -> NSR/ST.  IV  amio continued but still with ectomy with short runs of SVT further complicated by guppy breathing and deep sedation had to be resumed leading to to hypotension so started on phenylephrine. She required DCCV again on 7/2.  Ectopy now largely resolved and on oral amiodarone    Failed extubation of 7/6 so underwent trach placement at the bedside on 7/7.    [facility]monology consulted and [facility] and consider roflumalist or chronic azithromycin at discharge given frequent exacerbations      Problem List:   Acute on chronic hypoxic and hypercapnic respiratory failure  COPD with acute exacerbation chronically on 2-3 L/nc prn   L sided ptx  pna with sputum + for klebsiella oxytoca  Pulmonary edema 2/2 fluid resuscitation for sepsis  Complicated respiratory course-initially intubated due to progressive decline in respiratory function after admission-high airway pressures from severe bronchospasm required deep sedation including paralysis with vec.  She was also treated with 24 hours of continuous albuterol neb  Klebsiella oxytoca pna        --received 4 days azith->3 days of clinda->3 days cefepime/vanco-> 4 days of                                 meropenem/vanc->day 4 of cefepime.  Some overlap so on day 15 of abx         -- continues with some LGF in the  range.  6/26 CXR with left sided ptx         --CT placed by IR and removed 7/9  COPD w acute exacerbation         --methylpred 125 mg bid x 5 days-> 40 mg bid x 2 days -> 40 mg every day x 5 days then            stopped 7/5          -- continues on duonebs with levalbuterol given tachycardia qid          --pulmonary recs to change home breo to [facility] and consider anti exacerbation medication, for now on budesonide nebs             Such as roflumalist or azith at discharge   Volume overload  Echo from 3/2023 hyperdynamic EF > 70 %- no comment on diastolic dysfunction   She did require a furosemide gtt along with periodic metolazone with  excellent diuresis         -- now off all diuretics:  I/O looks even, weights-up about 4 kg from admission   Failed extubation 7/6 so underwent trach placement 7/7  Will need LTAC for recovery     Paroxysmal AF/SVT  Pta on diltiazem and apixaban (BB being avoided due to severe COPD)  Required DCCV x 2 during this hospitalization.         -on amio, dilt 90 mg q 6 hours, and apixaban    Sepsis  Elevated lactate  Her hospital course has been peppered with episodic sepsis with leukocytosis/tachycardia/fever/respiratory failure etc  -resolved  -elevated lactate due to sepsis, and nebs     Ileus  On TFs, no bms no passing gas, abdominal distension.  No abdominal pain.  All noted on exam 7/10, AXR with e/o ileus  Has been getting some narcotics so would consider naltrexone if fails to improve  -trial simethicone tid for 6 doses   -TFs stopped     Hypotension   Did require transient phenylephrine support due to deep sedation     ? CAUTI  Hines cath placed for critical illness, UA 6/28 with inflammatory cells with large blood  UCx with  K with pseudomonas  -rec'd 2 days of liberty and now on day 4 of cefepime     Hyperkalemia  Potassium bola to 5.5 and persisted in that range for a day or 2.  She received several doses of Lokelma and potassium level has now normalized.  Unclear etiology.  Renal function is normal.  Her PTA losartan has been on hold    HypernatremiaSignificant rise in sodium up to 160 with tube feed initiation.  Improved with high-dose free water via NG and D5, but remained at about 150.  Sodium is now normalized after dose of metolazone and Lasix drip.  -  -BMP tomorrow    Hypertension pta on dilt and losartan  dilt back on and being titrated  Losartan discontinued in the setting of hyperkalemia    Anemia  Drifting down during hospitalization and now at 6.8-otherwise hemodynamically stable. No clear bleeding noted.  Suspect critical illness/venipuncture mediated  -transfuse single unit PRBC 7/8    Covid  "negative on admission  S/p 2 shot moderna series 5/2021      DVT Prophylaxis: DOAC  Code Status: Full Code  Functional Status:  Diet: TF for now, slp input 7/11  Hines: not in   Access PIVs    Disposition Plan   Expected discharge in tbd days to LTAC once bed found.     Entered: Miracle Charles MD 07/10/2023, 5:12 PM       Securely message with Vocera (more info)  Text page via [employer] app Paging/Directory       I spent 35 minutes reviewing epic including prior labs/imaging/medical history and notes related to this encounter  In addition time was spent in interveiwing the patient, communicating with contacts, and medical decision making      Interval History (Subjective):      Thirsty but feeling much better compared with yesterday much calmer.  Breathing is ok                   Physical Exam:      Last Vital Signs:  /72   Pulse 74   Temp 96.9  F (36.1  C) (Temporal)   Resp 20   Ht 1.6 m (5' 3\")   Wt 69.6 kg (153 lb 7 oz)   SpO2 98%   BMI 27.18 kg/m      I/O:  Extubated, trach in place, much calmer, nad looks stated age, looks incredibly weak.  Lungs coarse without wheeze rrr no mrg no le edema, maybe trace anasarca belly protuberant and bloated but non tender skin warm and dry no cyanosis or clubbing affect is calm            Medications:      All current medications were reviewed with changes reflected in problem list.         Data:      All new lab and imaging data was reviewed.   Labs:  Recent Labs   Lab 07/10/23  1551 07/10/23  0809 07/10/23  0359   NA  --   --  139   POTASSIUM  --   --  4.0   CHLORIDE  --   --  104   CO2  --   --  29   ANIONGAP  --   --  6*   *   < > 135*   BUN  --   --  19.4   CR  --   --  0.40*   GFRESTIMATED  --   --  >90   EDIN  --   --  8.3*    < > = values in this interval not displayed.     Recent Labs   Lab 07/10/23  0359   WBC 7.2   HGB 7.6*   HCT 24.2*   *        Recent Labs   Lab 07/10/23  1551 07/10/23  1431 07/10/23  1220 07/10/23  0809 07/10/23  0359 "   * 104* 106* 131* 135*     Recent Labs   Lab 07/09/23  0443   AST 25   ALT 78*   ALKPHOS 63   BILITOTAL 0.3      Imaging:   Results for orders placed or performed during the hospital encounter of 06/20/23   XR Chest Port 1 View    Narrative    EXAM: XR CHEST PORT 1 VIEW  LOCATION: [facility]  DATE: 6/21/2023    INDICATION: COPD  COMPARISON: 03/20/2023      Impression    IMPRESSION: Stable chest. Again seen is hyperinflation of both lungs. Chronic interstitial increased density in the upper left chest may represent aspirated barium or changes of calcification from old inflammation. Partially calcified thoracic aorta. No   inflammatory infiltrates or active CHF.   XR Chest Port 1 View    Narrative    XR CHEST PORT 1 VIEW 6/21/2023 12:32 PM    HISTORY: Endotracheal tube placement    COMPARISON: X-ray the same date at 0041 hours      Impression    IMPRESSION: The endotracheal tube tip is not well-seen but appears to  be approximately 7.6 cm above the stevie. Enteric tube with the distal  visualized portion below the diaphragm. No pneumothorax. No focal  pneumonic consolidation or pleural effusion. Stable clustered  calcified nodules in the left upper lobe. Normal heart size.    KAYLA HUANG MD         SYSTEM ID:  [id number]   XR Abdomen Port 1 View    Narrative    XR ABDOMEN PORT 1 VIEW 6/21/2023 12:33 PM    HISTORY: NG placement    COMPARISON: Chest x-ray the same date      Impression    IMPRESSION: Interval placement of an enteric section tube with the tip  and side-port in satisfactory position within the gastric lumen.  Nonobstructive bowel gas pattern. Right upper quadrant surgical clips.  Prominent vascular calcifications.    KAYLA HUANG MD         SYSTEM ID:  [id number]   XR Chest Port 1 View    Narrative    EXAM: XR CHEST PORT 1 VIEW  LOCATION: [facility]  DATE: 6/21/2023    INDICATION: check ETT position  COMPARISON: 06/21/2023      Impression     IMPRESSION: Endotracheal tube in good position 4 cm above the stevie. NG tube courses below the diaphragm. The lungs are clear.   XR Chest Port 1 View    Narrative    CHEST ONE VIEW  6/23/2023 8:37 AM     HISTORY: Worsening ABGs and respiratory status on vent.    COMPARISON: June 21, 2023      Impression    IMPRESSION: Endotracheal tube tip 5 cm from the stevie. Right-sided  PICC tip in the low SVC. Trace pleural fluid or pleural thickening on  the left. No acute infiltrates. Calcifications at the left apex  stable.    GHAZALA ORELLANA MD         SYSTEM ID:  [id number]   XR Chest Port 1 View    Narrative    CHEST ONE VIEW  6/26/2023 9:29 AM     HISTORY: Increasing oxygen requirements and airway pressure.    COMPARISON: June 23, 2023      Impression    IMPRESSION: Endotracheal tube tip 4.3 cm from the stevie. Right PICC  line stable. Minimal pleural fluid or pleural thickening bilaterally  similar to previous. Small-to-moderate pneumothorax on the left new  from previous.    Results called to the patient's nurse on June 26, 2023 at 9:44 AM.     GHAZALA ORELLANA MD         SYSTEM ID:  [id number]   XR Chest Port 1 View    Narrative    CHEST ONE VIEW  6/26/2023 1:10 PM     HISTORY: Pneumothorax, followup.    COMPARISON: June 26, 2023 at 9:15 AM.      Impression    IMPRESSION: Small to moderate left pneumothorax is not significantly  changed since the comparison study. Right PICC unchanged. No acute  infiltrates. Enteric tube tip approximately 4.6 cm from the stevie.    GHAZALA ORELLANA MD         SYSTEM ID:  [id number]   CT Chest Tube with Cath Placement    Narrative    CT CHEST TUBE WITH CATH PLACEMENT  6/26/2023 3:14 PM     HISTORY:  Patient is intubated and has a spontaneous left  pneumothorax.    COMPARISON: Chest x-ray dated 6/26/2023    FINDINGS: After obtaining informed consent, the patient was placed in  a supine position on the CT table. The left anterior chest was prepped  and draped in the usual sterile manner. 1% lidocaine  was injected for  local anesthesia. Under CT guidance, using a 5 Tongan Yueh needle,  access into the left pleural space was obtained. A wire was curled in  the pleural space. Over the wire, an 8 Tongan locking pigtail catheter  was placed. Air was suctioned out of the left thorax. Follow-up CT  scan showed interval decrease in size of the left pneumothorax. The  catheter was sutured to the patient's skin and hooked to a pleura vac  suction apparatus.    The patient tolerated the procedure well. There were no immediate  postprocedure complications.      Impression    IMPRESSION: CT-guided left chest tube placed as above. Radiation dose  for this scan was reduced using automated exposure control, adjustment  of the mA and/or kV according to patient size, or iterative  reconstruction technique.    RODERICK EARL MD         SYSTEM ID:  [id number]   XR Chest Port 1 View    Narrative    CHEST PORTABLE 1 VIEW   6/27/2023 10:18 AM     HISTORY: Status post chest tube placement for pneumothorax.    COMPARISON: Chest x-ray on 6/26/2023.      Impression    IMPRESSION: Single AP view of the chest was obtained. Endotracheal  tube tip projects over the midthoracic trachea approximately 4 cm from  the stevie. Enteric tube crosses the diaphragm with distal tip outside  the field of view. Right upper extremity PICC tip projects over the  high right atrium. Cardiomediastinal silhouette is within normal  limits. Significant interval decrease/almost complete resolution of  the previously seen left pneumothorax status post chest tube  placement. Multiple small radiodensity projects over the left lung  apex, likely represent areas of calcification seen on prior chest CTs.  Otherwise, no suspicious focal pulmonary opacities. No significant  pleural effusion or pneumothorax.    TENZIN SHEPHERD MD         SYSTEM ID:  [id number]   XR Abdomen Port 1 View    Narrative    XR ABDOMEN PORT 1 VIEW   6/28/2023 4:28 PM     HISTORY: confirm  placement of feeding tube    COMPARISON: Abdominal radiograph 6/21/2023      Impression    IMPRESSION: No radiographic evidence of bowel obstruction. Interval  placement of feeding tube with tip overlying the expected position of  the ligament of Treitz. New left basilar pulmonary opacity, could  represent atelectasis or developing airspace consolidation, consider  dedicated chest radiograph. Bones are unchanged.    BRYON MARSHALL MD         SYSTEM ID:  [id number]   XR Chest Port 1 View    Narrative    CHEST ONE VIEW  6/30/2023 12:23 PM     HISTORY: Increasing airway pressure with history of pneumothorax.    COMPARISON: June 27, 2023      Impression    IMPRESSION: Endotracheal tube tip 5.5 cm from the stevie. Right PICC  tip in the low SVC. No pneumothorax.    GHAZALA ORELLANA MD         SYSTEM ID:  [id number]   XR Chest Port 1 View    Narrative    EXAM: XR CHEST PORT 1 VIEW  LOCATION: [facility]  DATE: 7/2/2023    INDICATION: ETT placement  COMPARISON: None.      Impression    IMPRESSION: Endotracheal tube tip 5.4 cm above the stevie in good position. Heart size and mediastinum are unchanged. Right PICC line with tip in the distal SVC. Increased left lower lobe opacification suggesting increasing subsegmental atelectasis with   a left pleural effusion. No pneumothorax. Right lung is clear.   XR Chest Port 1 View    Narrative    EXAM: XR CHEST PORT 1 VIEW  LOCATION: [facility]  DATE: 7/4/2023    INDICATION: ventilator  COMPARISON: 07/02/2023      Impression    IMPRESSION: The ET tube is unchanged in position. The right PICC and enteric tube are unchanged in position. The heart is unchanged in size and contour. There is mild central pulmonary venous congestion. Mild interstitial edema seen in the lung bases   bilaterally.   XR Chest Port 1 View    Narrative    EXAM: XR CHEST PORT 1 VIEW  LOCATION: [facility]  DATE: 7/6/2023    INDICATION: Endotracheal  tube positioning  COMPARISON: 07/04/2023      Impression    IMPRESSION: The ET tube and right PICC as well as the enteric tube are all unchanged in position. Left basilar subsegmental atelectasis is again noted, similar to prior exam.   XR Chest Port 1 View    Narrative    XR CHEST PORT 1 VIEW 7/7/2023 4:00 PM    HISTORY: Mechanical ventilation, paroxysmal atrial fibrillation,  tracheostomy tube placement    COMPARISON: X-ray 7/6/2023      Impression    IMPRESSION: Interval placement of a tracheostomy tube with the tip  located in satisfactory position 4.3 cm above the stevie. Stable  satisfactory right PICC line position. Enteric tube with the distal  visualized portion in the gastric lumen. Stable left-sided pleural  drainage catheter. No definite pneumothorax. Increased left basilar  pulmonary opacities which may reflect atelectasis or pneumonia. The  right lung is clear. Normal heart size. No vascular congestion.    KAYLA HUANG MD         SYSTEM ID:  [id number]

## 2023-07-10 NOTE — PROGRESS NOTES
Hospital Day# 19  ICU DAY # 19     MV DAY # 19                Problem List:   Principal Problem:    Paroxysmal atrial fibrillation with RVR (H)  Active Problems:    COPD exacerbation (H)    Anticoagulated    Acute and chronic respiratory failure with hypercapnia (H)             Summary/Hospital Course:   57-year-old female with history of COPD, emphysema, O2 dependent at home, tobacco dependence, hypertension, anxiety, atrial fibrillation on Eliquis, hypothyroidism presented with worsening shortness of breath and wheezing after having been prescribed azithromycin and prednisone prior to admission. COPD Exacerbation vs Status Asthmaticus in the ED,given IV steroids, IV magnesium nebulizers and BiPAP.  Admitted to the ICU, failed BiPAP and was intubated.  She had significant bronchospasm with high airway pressures requiring deep sedation with fentanyl, ketamine and propofol and finally a paralytic.  Has had several episodes of Afib with RVR (known history) for which she was cardioverted on 7/8 and again on 7/9.  Has been weaned off of paralytic, but failed extubation and was trached at bedside on 7/7 (sutures to be removed 7/12) and has since been slowly improving, however BAL on 7/7 was positive for Rhinovirus so patient remains on droplet precautions.   Now ready for LTACH placement.    Assessment and plan :      Lara Melendez IS a 57 year old female admitted on 6/20/2023 for, acute respiratory failure secondary to COPD exacerbation, small left-sided pneumothorax (resolved) and history of atrial fibrillation on Eliquis.     I have personally reviewed the daily labs, imaging studies, cultures and discussed the case with referring physician and consulting physicians.      My assessment and plan by system for this patient is as follows:     Neurology/Psychiatry:   1. Sedation  2  Pain Mgt  3. ICU Delirium (improving)  4. Neuromuscular weakness of critical illness (improving)     Plan    Contnue Precedex; wean  off as toleratead  Scheduled Acetaminophen .  Scheduled Oxy 5 mg q4  PT/OT: Up to chair  Tatyana's boot to bilateral feet        Cardiovascular:   1.Hemodynamics -normotensive to hypertensive  2.Rhythm currently sinus with PACs  3. A-fib with episodes of RVR: required cardioversion but now NSR    Plan  Rate controlled  Appreciate cardiology input  Continue p.o. amiodarone.   Continue p.o. cardizem  Mg >2.2  K+ >4  On Apixiban for anticoagulation, restarted 7/9 evening.      Pulmonary/Ventilator Management:   1. Intubated for acute hypoxic and hypercapnic respiratory failure, now trached  2. Oxygenation/ventilation/mechanics on full mechanical ventilatory support    Plan     Vent Mode: (S) CPAP/PS  (Continuous positive airway pressure with Pressure Support)  FiO2 (%): 30 %  Resp Rate (Set): 18 breaths/min  Tidal Volume (Set, mL): 350 mL  PEEP (cm H2O): 8 cmH2O  Pressure Support (cm H2O): 10 cmH2O  Resp: 17       S/p Perc Trach 7/7/2023  Suture removal 7/12  Trach cares  PS as tolerated  Repeat CXR but can likely remove chest tube.       GI and Nutrition :   1.  Significant distension on exam (no discomfort) and Abd film shows what appears to be an ileus.      2.  Patient does not meet criteria for malnutrition at this time     Plan  - Will hold TF today and provide some pro-motility agents and follow.       Renal/Fluids/Electrolytes:   1.  No acute renal injury at this time  2.  Hypernatremia: resolved  3.  Last gas shows an appropriately compensated respiratory acidosis.  No need to recheck today  4.  Appears euvolemic    Plan  - monitor function and electrolytes as needed with replacement per ICU protocols. - generally avoid nephrotoxic agents such as NSAID, IV contrast unless specifically required  - adjust medications as needed for renal clearance  - follow I/O's as appropriate.        Infectious Disease:   1.  Sepsis: Resolved              UTI: Pseudomonas aeruginosa              positive sputum culture:  "Klebsiella oxytoca  2. Rhinovirus              - Droplet precautions              - supportive cares     Plan  -Completed 10 day course of appropriate Abx        Endocrine:   1.  Concern for stress-induced hyperglycemia  2.  Concern for diabetes induced hyperglycemia    Plan  - ICU insulin protocol, goal sugar <180, sugars have been well controlled.        Hematology/Oncology:   1.  Leukocytosis: resolved  2.  Anemia: multifactorial  3.  Medication induced coagulopathy: On Eliquis for atrial fibrillation    Plan  - Transfuse for Hgb <7  - No evidence of bleed, restarted eliquis 7/9 evening       IV/Access:   1. Venous access -central access  2. Arterial access - n/a      Plan  - central access required and necessary        ICU Prophylaxis:   1. DVT: Eliquis; venodynes  2. VAP: HOB 30 degrees, chlorhexidine rinse  3. Stress Ulcer: PPI/H2 blocker  4. Restraints: None   5. Wound care: local wound care as needed  6. Feeding: continue tube feeds  7. Family Update:  updated via phone  8. Disposition:  ICU; Medically stable for LTACH           Key goals for next 24 hours:   1.  Out of bed to chair  2.  Pulmonary hygiene   3.  PT/OT          Physical Examination:     Vital signs:  Temp: 97.4  F (36.3  C) Temp src: Temporal BP: (!) 144/78 Pulse: 78   Resp: 17 SpO2: 100 % O2 Device: Mechanical Ventilator Oxygen Delivery: 50 LPM Height: 160 cm (5' 3\") Weight: 69.6 kg (153 lb 7 oz)  Estimated body mass index is 27.18 kg/m  as calculated from the following:    Height as of this encounter: 1.6 m (5' 3\").    Weight as of this encounter: 69.6 kg (153 lb 7 oz).    I/O last 3 completed shifts:  In: 2177.6 [I.V.:82.6; NG/GT:535]  Out: 930 [Urine:890; Drains:40]      Intake/Output Summary (Last 24 hours) at 7/10/2023 0750  Last data filed at 7/10/2023 0600  Gross per 24 hour   Intake 2108.2 ml   Output 930 ml   Net 1178.2 ml       GEN: no acute distress, responds appropriately, complains of some pain.  HEENT: head ncat, sclera " anicteric, OP patent, trachea midline, trach site clean  PULM: Synchronous with vent, diffuse wheezes anteriorly but improving overall chest tube in good position show signs no air leak  CV/COR: Irregular rate and rhythm S1S2 no gallop,  No rub, no murmur  ABD: soft nontender, hypoactive bowel sounds, no mass  EXT:  Little edema, warm  NEURO: Does not follow commands, consistent with chemical sedation  SKIN: no obvious rash  LINES: clean, dry intact           Data:   All data and imaging reviewed    Results for orders placed or performed during the hospital encounter of 06/20/23 (from the past 24 hour(s))   Glucose by meter   Result Value Ref Range    GLUCOSE BY METER POCT 130 (H) 70 - 99 mg/dL   Glucose by meter   Result Value Ref Range    GLUCOSE BY METER POCT 133 (H) 70 - 99 mg/dL   Glucose by meter   Result Value Ref Range    GLUCOSE BY METER POCT 130 (H) 70 - 99 mg/dL   Glucose by meter   Result Value Ref Range    GLUCOSE BY METER POCT 142 (H) 70 - 99 mg/dL   Glucose by meter   Result Value Ref Range    GLUCOSE BY METER POCT 126 (H) 70 - 99 mg/dL   Basic metabolic panel   Result Value Ref Range    Sodium 139 136 - 145 mmol/L    Potassium 4.0 3.4 - 5.3 mmol/L    Chloride 104 98 - 107 mmol/L    Carbon Dioxide (CO2) 29 22 - 29 mmol/L    Anion Gap 6 (L) 7 - 15 mmol/L    Urea Nitrogen 19.4 8.0 - 23.0 mg/dL    Creatinine 0.40 (L) 0.51 - 0.95 mg/dL    Calcium 8.3 (L) 8.6 - 10.0 mg/dL    Glucose 135 (H) 70 - 99 mg/dL    GFR Estimate >90 >60 mL/min/1.73m2   CBC with platelets   Result Value Ref Range    WBC Count 7.2 4.0 - 11.0 10e3/uL    RBC Count 2.34 (L) 3.80 - 5.20 10e6/uL    Hemoglobin 7.6 (L) 11.7 - 15.7 g/dL    Hematocrit 24.2 (L) 35.0 - 47.0 %     (H) 78 - 100 fL    MCH 32.5 26.5 - 33.0 pg    MCHC 31.4 (L) 31.5 - 36.5 g/dL    RDW 18.5 (H) 10.0 - 15.0 %    Platelet Count 151 150 - 450 10e3/uL   Magnesium   Result Value Ref Range    Magnesium 2.0 1.7 - 2.3 mg/dL   Phosphorus   Result Value Ref Range     Phosphorus 2.8 2.5 - 4.5 mg/dL     Critical Care Services Progress Note:     Lara Melendez remains critically ill with hypoxic respiratory failure and hypercarbic respiratory failure     I personally examined and evaluated the patient today.   The patient s prognosis today is unchanged  I have evaluated all laboratory values and imaging studies from the past 24 hours.  Key findings and decisions made today included continued vent weaning, chest tube management, increased activity, management of ongoing delirium  I personally managed the ventilator, sedation, pain control and analgesia, metabolic abnormalities, and nutritional status.   Consults ongoing and ordered are Cardiology  Procedures that will happen today are: probably chest tube removal  All treatments were placed at my direction.  I formulated today s plan with the house staff team or resident(s) and agree with the findings and plan in the associated note.    The above plans and care have been discussed with spouse and all questions and concerns were addressed.  I spent a total of 55 minutes (excluding procedure time) personally providing and directing critical care services at the bedside and on the critical care unit for Lara Melendez.        Royal Pandya MD

## 2023-07-10 NOTE — PLAN OF CARE
ICU End of Shift Summary.  For vital signs and complete assessments, please see documentation flowsheets.      Pertinent assessments: A&Ox4. RASS 0/-1 on Precedex. Tolerating ventilation via trach. Continues to have a very weak cough, in-line suction with thick, creamy secretions. Tele SR with PACs. BP stable. Urine output adequate via el. Tolerating TF at goal. No BM this shift. Intermittently refusing repositioning, rooke boots, etc. Mobility improved in extremities. Frequent oral cares per patient request. Intermittently sleeping between cares.   Major Shift Events: Mag replaced. Wean from vent.   Plan (Upcoming Events): Continue weans. Remove chest tube when able.  Discharge/Transfer Needs: Social work consult for assistance with LTACH placement.      Bedside Shift Report Completed: Y   Bedside Safety Check Completed: Y    Goal Outcome Evaluation:  Plan of Care Reviewed With: patient  Overall Patient Progress: improving  Outcome Evaluation: Tolerating wean, pain well managed.

## 2023-07-10 NOTE — PROGRESS NOTES
Notified provider about indwelling el catheter discussed removal or continued need.    Did provider choose to remove indwelling el catheter? NO    Provider's el indication for keeping indwelling el catheter: Indication for continued use: Retention    Is there an order for indwelling el catheter? YES    *If there is a plan to keep el catheter in place at discharge daily notification with provider is not necessary, but please add a notation in the treatment team sticky note that the patient will be discharging with the catheter.

## 2023-07-11 ENCOUNTER — APPOINTMENT (OUTPATIENT)
Dept: GENERAL RADIOLOGY | Facility: CLINIC | Age: 58
End: 2023-07-11
Attending: INTERNAL MEDICINE
Payer: COMMERCIAL

## 2023-07-11 ENCOUNTER — APPOINTMENT (OUTPATIENT)
Dept: CT IMAGING | Facility: CLINIC | Age: 58
End: 2023-07-11
Attending: INTERNAL MEDICINE
Payer: COMMERCIAL

## 2023-07-11 LAB
ANION GAP SERPL CALCULATED.3IONS-SCNC: 8 MMOL/L (ref 7–15)
BUN SERPL-MCNC: 15.6 MG/DL (ref 8–23)
CALCIUM SERPL-MCNC: 8.6 MG/DL (ref 8.6–10)
CHLORIDE SERPL-SCNC: 102 MMOL/L (ref 98–107)
CREAT SERPL-MCNC: 0.48 MG/DL (ref 0.51–0.95)
DEPRECATED HCO3 PLAS-SCNC: 28 MMOL/L (ref 22–29)
ERYTHROCYTE [DISTWIDTH] IN BLOOD BY AUTOMATED COUNT: 17.4 % (ref 10–15)
GFR SERPL CREATININE-BSD FRML MDRD: >90 ML/MIN/1.73M2
GLUCOSE BLDC GLUCOMTR-MCNC: 101 MG/DL (ref 70–99)
GLUCOSE BLDC GLUCOMTR-MCNC: 104 MG/DL (ref 70–99)
GLUCOSE BLDC GLUCOMTR-MCNC: 110 MG/DL (ref 70–99)
GLUCOSE BLDC GLUCOMTR-MCNC: 93 MG/DL (ref 70–99)
GLUCOSE BLDC GLUCOMTR-MCNC: 94 MG/DL (ref 70–99)
GLUCOSE BLDC GLUCOMTR-MCNC: 96 MG/DL (ref 70–99)
GLUCOSE SERPL-MCNC: 109 MG/DL (ref 70–99)
HCT VFR BLD AUTO: 26.5 % (ref 35–47)
HGB BLD-MCNC: 8.2 G/DL (ref 11.7–15.7)
LACTATE SERPL-SCNC: 0.3 MMOL/L (ref 0.7–2)
MAGNESIUM SERPL-MCNC: 1.8 MG/DL (ref 1.7–2.3)
MCH RBC QN AUTO: 32.3 PG (ref 26.5–33)
MCHC RBC AUTO-ENTMCNC: 30.9 G/DL (ref 31.5–36.5)
MCV RBC AUTO: 104 FL (ref 78–100)
PHOSPHATE SERPL-MCNC: 3.5 MG/DL (ref 2.5–4.5)
PLATELET # BLD AUTO: 177 10E3/UL (ref 150–450)
POTASSIUM SERPL-SCNC: 4 MMOL/L (ref 3.4–5.3)
RBC # BLD AUTO: 2.54 10E6/UL (ref 3.8–5.2)
SODIUM SERPL-SCNC: 138 MMOL/L (ref 136–145)
WBC # BLD AUTO: 7.3 10E3/UL (ref 4–11)

## 2023-07-11 PROCEDURE — 250N000009 HC RX 250: Performed by: INTERNAL MEDICINE

## 2023-07-11 PROCEDURE — 74177 CT ABD & PELVIS W/CONTRAST: CPT

## 2023-07-11 PROCEDURE — 250N000013 HC RX MED GY IP 250 OP 250 PS 637: Performed by: ANESTHESIOLOGY

## 2023-07-11 PROCEDURE — 999N000157 HC STATISTIC RCP TIME EA 10 MIN

## 2023-07-11 PROCEDURE — 94003 VENT MGMT INPAT SUBQ DAY: CPT

## 2023-07-11 PROCEDURE — 84100 ASSAY OF PHOSPHORUS: CPT | Performed by: INTERNAL MEDICINE

## 2023-07-11 PROCEDURE — 71045 X-RAY EXAM CHEST 1 VIEW: CPT

## 2023-07-11 PROCEDURE — 999N000253 HC STATISTIC WEANING TRIALS

## 2023-07-11 PROCEDURE — 250N000013 HC RX MED GY IP 250 OP 250 PS 637: Performed by: INTERNAL MEDICINE

## 2023-07-11 PROCEDURE — 94640 AIRWAY INHALATION TREATMENT: CPT

## 2023-07-11 PROCEDURE — 999N000065 XR ABDOMEN PORT 1 VIEW

## 2023-07-11 PROCEDURE — 94640 AIRWAY INHALATION TREATMENT: CPT | Mod: 76

## 2023-07-11 PROCEDURE — 83605 ASSAY OF LACTIC ACID: CPT | Performed by: INTERNAL MEDICINE

## 2023-07-11 PROCEDURE — 250N000013 HC RX MED GY IP 250 OP 250 PS 637: Performed by: SURGERY

## 2023-07-11 PROCEDURE — 83735 ASSAY OF MAGNESIUM: CPT | Performed by: INTERNAL MEDICINE

## 2023-07-11 PROCEDURE — 250N000013 HC RX MED GY IP 250 OP 250 PS 637: Performed by: HOSPITALIST

## 2023-07-11 PROCEDURE — 99292 CRITICAL CARE ADDL 30 MIN: CPT | Performed by: INTERNAL MEDICINE

## 2023-07-11 PROCEDURE — 99291 CRITICAL CARE FIRST HOUR: CPT | Performed by: INTERNAL MEDICINE

## 2023-07-11 PROCEDURE — 250N000011 HC RX IP 250 OP 636: Mod: JZ | Performed by: INTERNAL MEDICINE

## 2023-07-11 PROCEDURE — 80048 BASIC METABOLIC PNL TOTAL CA: CPT | Performed by: INTERNAL MEDICINE

## 2023-07-11 PROCEDURE — 250N000011 HC RX IP 250 OP 636: Performed by: INTERNAL MEDICINE

## 2023-07-11 PROCEDURE — 85027 COMPLETE CBC AUTOMATED: CPT | Performed by: INTERNAL MEDICINE

## 2023-07-11 PROCEDURE — 250N000011 HC RX IP 250 OP 636: Performed by: ANESTHESIOLOGY

## 2023-07-11 PROCEDURE — 99232 SBSQ HOSP IP/OBS MODERATE 35: CPT | Performed by: INTERNAL MEDICINE

## 2023-07-11 PROCEDURE — 99207 PR NO BILLABLE SERVICE THIS VISIT: CPT | Performed by: ANESTHESIOLOGY

## 2023-07-11 PROCEDURE — 999N000185 HC STATISTIC TRANSPORT TIME EA 15 MIN

## 2023-07-11 PROCEDURE — 200N000001 HC R&B ICU

## 2023-07-11 RX ORDER — ATROPINE SULFATE 0.1 MG/ML
INJECTION INTRAVENOUS
Status: COMPLETED
Start: 2023-07-11 | End: 2023-07-11

## 2023-07-11 RX ORDER — DILTIAZEM HYDROCHLORIDE 5 MG/ML
5 INJECTION INTRAVENOUS EVERY 4 HOURS
Status: DISCONTINUED | OUTPATIENT
Start: 2023-07-11 | End: 2023-07-15

## 2023-07-11 RX ORDER — ATROPINE SULFATE 0.4 MG/ML
0.4 AMPUL (ML) INJECTION
Status: DISCONTINUED | OUTPATIENT
Start: 2023-07-11 | End: 2023-07-13

## 2023-07-11 RX ORDER — MAGNESIUM SULFATE HEPTAHYDRATE 40 MG/ML
2 INJECTION, SOLUTION INTRAVENOUS ONCE
Status: COMPLETED | OUTPATIENT
Start: 2023-07-11 | End: 2023-07-11

## 2023-07-11 RX ORDER — FUROSEMIDE 10 MG/ML
20 INJECTION INTRAMUSCULAR; INTRAVENOUS ONCE
Status: COMPLETED | OUTPATIENT
Start: 2023-07-11 | End: 2023-07-11

## 2023-07-11 RX ORDER — NEOSTIGMINE METHYLSULFATE 0.5 MG/ML
0.5 INJECTION INTRAVENOUS ONCE
Status: DISCONTINUED | OUTPATIENT
Start: 2023-07-11 | End: 2023-07-11

## 2023-07-11 RX ORDER — MAGNESIUM SULFATE HEPTAHYDRATE 40 MG/ML
2 INJECTION, SOLUTION INTRAVENOUS ONCE
Status: DISCONTINUED | OUTPATIENT
Start: 2023-07-11 | End: 2023-07-11

## 2023-07-11 RX ORDER — DILTIAZEM HYDROCHLORIDE 30 MG/1
60 TABLET, FILM COATED ORAL
Status: DISCONTINUED | OUTPATIENT
Start: 2023-07-11 | End: 2023-07-13

## 2023-07-11 RX ORDER — IOPAMIDOL 755 MG/ML
500 INJECTION, SOLUTION INTRAVASCULAR ONCE
Status: COMPLETED | OUTPATIENT
Start: 2023-07-11 | End: 2023-07-11

## 2023-07-11 RX ORDER — NEOSTIGMINE METHYLSULFATE 1 MG/ML
0.5 VIAL (ML) INJECTION ONCE
Status: COMPLETED | OUTPATIENT
Start: 2023-07-11 | End: 2023-07-11

## 2023-07-11 RX ORDER — MAGNESIUM OXIDE 400 MG/1
400 TABLET ORAL DAILY
Status: DISCONTINUED | OUTPATIENT
Start: 2023-07-11 | End: 2023-07-22

## 2023-07-11 RX ADMIN — LEVOTHYROXINE SODIUM 112 MCG: 0.11 TABLET ORAL at 08:49

## 2023-07-11 RX ADMIN — APIXABAN 5 MG: 5 TABLET, FILM COATED ORAL at 08:50

## 2023-07-11 RX ADMIN — DILTIAZEM HYDROCHLORIDE 5 MG: 5 INJECTION, SOLUTION INTRAVENOUS at 17:52

## 2023-07-11 RX ADMIN — DILTIAZEM HYDROCHLORIDE 60 MG: 30 TABLET, FILM COATED ORAL at 04:05

## 2023-07-11 RX ADMIN — LEVALBUTEROL HYDROCHLORIDE 0.63 MG: 0.63 SOLUTION RESPIRATORY (INHALATION) at 11:57

## 2023-07-11 RX ADMIN — BUDESONIDE 0.5 MG: 0.5 INHALANT ORAL at 20:23

## 2023-07-11 RX ADMIN — MAGNESIUM SULFATE HEPTAHYDRATE 2 G: 2 INJECTION, SOLUTION INTRAVENOUS at 08:50

## 2023-07-11 RX ADMIN — IOPAMIDOL 78 ML: 755 INJECTION, SOLUTION INTRAVENOUS at 19:35

## 2023-07-11 RX ADMIN — LACTULOSE 20 G: 20 SOLUTION ORAL at 08:49

## 2023-07-11 RX ADMIN — ONDANSETRON 4 MG: 2 INJECTION INTRAMUSCULAR; INTRAVENOUS at 14:27

## 2023-07-11 RX ADMIN — LEVALBUTEROL HYDROCHLORIDE 0.63 MG: 0.63 SOLUTION RESPIRATORY (INHALATION) at 07:28

## 2023-07-11 RX ADMIN — OXYCODONE HYDROCHLORIDE 5 MG: 5 SOLUTION ORAL at 04:05

## 2023-07-11 RX ADMIN — LEVALBUTEROL HYDROCHLORIDE 0.63 MG: 0.63 SOLUTION RESPIRATORY (INHALATION) at 20:22

## 2023-07-11 RX ADMIN — OXYCODONE HYDROCHLORIDE 5 MG: 5 SOLUTION ORAL at 00:07

## 2023-07-11 RX ADMIN — Medication 15 ML: at 08:49

## 2023-07-11 RX ADMIN — IPRATROPIUM BROMIDE 0.5 MG: 0.5 SOLUTION RESPIRATORY (INHALATION) at 07:28

## 2023-07-11 RX ADMIN — NICOTINE 1 PATCH: 14 PATCH, EXTENDED RELEASE TRANSDERMAL at 08:53

## 2023-07-11 RX ADMIN — BUDESONIDE 0.5 MG: 0.5 INHALANT ORAL at 07:28

## 2023-07-11 RX ADMIN — OXYCODONE HYDROCHLORIDE 5 MG: 5 SOLUTION ORAL at 08:49

## 2023-07-11 RX ADMIN — IPRATROPIUM BROMIDE 0.5 MG: 0.5 SOLUTION RESPIRATORY (INHALATION) at 20:23

## 2023-07-11 RX ADMIN — FUROSEMIDE 20 MG: 10 INJECTION, SOLUTION INTRAMUSCULAR; INTRAVENOUS at 17:50

## 2023-07-11 RX ADMIN — Medication 40 MG: at 09:45

## 2023-07-11 RX ADMIN — ATORVASTATIN CALCIUM 20 MG: 20 TABLET, FILM COATED ORAL at 08:50

## 2023-07-11 RX ADMIN — DILTIAZEM HYDROCHLORIDE 60 MG: 30 TABLET, FILM COATED ORAL at 08:50

## 2023-07-11 RX ADMIN — PAROXETINE 20 MG: 10 SUSPENSION ORAL at 09:11

## 2023-07-11 RX ADMIN — DILTIAZEM HYDROCHLORIDE 5 MG: 5 INJECTION, SOLUTION INTRAVENOUS at 21:48

## 2023-07-11 RX ADMIN — LEVALBUTEROL HYDROCHLORIDE 0.63 MG: 0.63 SOLUTION RESPIRATORY (INHALATION) at 15:36

## 2023-07-11 RX ADMIN — SENNOSIDES AND DOCUSATE SODIUM 2 TABLET: 50; 8.6 TABLET ORAL at 08:49

## 2023-07-11 RX ADMIN — SIMETHICONE 133 MG: 20 EMULSION ORAL at 08:50

## 2023-07-11 RX ADMIN — FUROSEMIDE 20 MG: 10 INJECTION, SOLUTION INTRAMUSCULAR; INTRAVENOUS at 09:45

## 2023-07-11 RX ADMIN — IPRATROPIUM BROMIDE 0.5 MG: 0.5 SOLUTION RESPIRATORY (INHALATION) at 15:36

## 2023-07-11 RX ADMIN — IPRATROPIUM BROMIDE 0.5 MG: 0.5 SOLUTION RESPIRATORY (INHALATION) at 11:57

## 2023-07-11 RX ADMIN — NEOSTIGMINE METHYLSULFATE 0.5 MG: 1 INJECTION INTRAVENOUS at 13:32

## 2023-07-11 RX ADMIN — DOCUSATE SODIUM 100 MG: 50 LIQUID ORAL at 04:13

## 2023-07-11 RX ADMIN — AMIODARONE HYDROCHLORIDE 400 MG: 200 TABLET ORAL at 08:50

## 2023-07-11 ASSESSMENT — ACTIVITIES OF DAILY LIVING (ADL)
ADLS_ACUITY_SCORE: 34
DEPENDENT_IADLS:: INDEPENDENT
ADLS_ACUITY_SCORE: 38
ADLS_ACUITY_SCORE: 34
ADLS_ACUITY_SCORE: 34
ADLS_ACUITY_SCORE: 38
ADLS_ACUITY_SCORE: 34
ADLS_ACUITY_SCORE: 34
ADLS_ACUITY_SCORE: 36
ADLS_ACUITY_SCORE: 38
ADLS_ACUITY_SCORE: 38

## 2023-07-11 NOTE — PROGRESS NOTES
[facility]  Hospitalist Progress Note  Miracle Charles MD 07/11/2023    Reason for Stay (Diagnosis): acute hypoxic respiratory failure          Assessment and Plan:      Summary of Stay: Lara Melendez is a 57 year old female with a history of COPD on prn home oxygen 2-3 L/nc, tobacco dependence, htn/hlp, PAF chronically on apixaban, anxiety, hypothyroidism, psoriasis admitted on 6/20/2023 with SOB and wheezing    Sx began a few days pta and she had called her pulmonologist who had initiated empiric azith/prednisone although sx persisted and worsened to the point the EMS was called and she was brought into the ER     Covid/flu/rsv negative  CXR with chronic SUSY infiltrate    She was admitted and placed on empiric abx/IV methylprednisolone    Admission notable to AF with RVR which converted to ST    The day after admission her respiratory status continued to decline to the point that she required intubation and mechanical ventilation on 6/21.  She had significant bronchospasm and high airway pressures requiring deep sedation with fentanyl/ketamine/propofol/vecuronium.  [facility] was contacted to assess for ECMO-but deemed not to be an appropriate candidate.  She was eventually able to wean off vec/ketamine after aggressive diuresis.     Repeat CXR on 6/26 revealed left ptx - > CT placed by IR.   She developed a fever 102 on 6/26 prompting initiation of clindamycin, but fevers persisted and wbc climbing so abx broadened to vanco and cefepime.  BCx remained negative but sputum culture grew out klebsiella oxytoca R to pip-tazo so abx switched over to meropenem and vanco discontinued due to negative MRSA screen    UCx ultimately grew out pseudomon   From a metabolic standpoint she was having progressive hypernatremia 2/2 TFs requiring free water with resolution.      On 6/30 She developed AF with RVR vs SVT not responsive to IV diltiazem and IV amiodarone and given associated soft BPs required DCCV -> NSR/ST.  IV  amio continued but still with ectomy with short runs of SVT further complicated by guppy breathing and deep sedation had to be resumed leading to to hypotension so started on phenylephrine. She required DCCV again on 7/2.  Ectopy now largely resolved and on oral amiodarone    Failed extubation of 7/6 so underwent trach placement at the bedside on 7/7.  Chest tube pulled 7/10    Pulmonology consulted and recommended changing breo to [facility] and consider roflumalist or chronic azithromycin at discharge given frequent exacerbations    Now with e/o Ileus, fairly refractory even to neostigmine.  NG attempted to be placed 7/11 at bedside and unsuccessful so IR RN came up to help and ended up placing NG in L lung       Problem List:   Acute on chronic hypoxic and hypercapnic respiratory failure  COPD with acute exacerbation chronically on 2-3 L/nc prn   L sided ptx  pna with sputum + for klebsiella oxytoca  Pulmonary edema 2/2 fluid resuscitation for sepsis  Complicated respiratory course-initially intubated due to progressive decline in respiratory function after admission-high airway pressures from severe bronchospasm required deep sedation including paralysis with vec.  She was also treated with 24 hours of continuous albuterol neb  Klebsiella oxytoca pna        --received 4 days azith->3 days of clinda->3 days cefepime/vanco-> 4 days of                                 meropenem/vanc->day 4 of cefepime.  Some overlap so on day 15 of abx         -- continues with some LGF in the  range.  6/26 CXR with left sided ptx         --CT placed by IR and removed 7/9  COPD w acute exacerbation         --methylpred 125 mg bid x 5 days-> 40 mg bid x 2 days -> 40 mg every day x 5 days then            stopped 7/5          -- continues on duonebs with levalbuterol given tachycardia qid          --pulmonary recs to change home breo to [facility] and consider anti exacerbation medication, for now on budesonide nebs             Such as  patito or kyler at discharge   Volume overload  Echo from 3/2023 hyperdynamic EF > 70 %- no comment on diastolic dysfunction   She did require a furosemide gtt along with periodic metolazone with excellent diuresis         -- now off all diuretics:  I/O looks even, weights-up about 4 kg from admission   Failed extubation 7/6 so underwent trach placement 7/7  Will need LTAC for recovery     NG placement complicated by being placed in the lung  Recheck XRay this evening to ensure no ptx developing     Paroxysmal AF/SVT  Pta on diltiazem and apixaban (BB being avoided due to severe COPD)  Required DCCV x 2 during this hospitalization.         -on amio, dilt 90 mg q 6 hours-> 5 mg IV q 4, and apixaban    Sepsis  Elevated lactate  Her hospital course has been peppered with episodic sepsis with leukocytosis/tachycardia/fever/respiratory failure etc  -resolved  -elevated lactate due to sepsis, and nebs     Ileus  On TFs, no bms no passing gas, abdominal distension.  No abdominal pain.  All noted on exam 7/10, AXR with e/o ileus  -trial simethicone tid for 6 doses, aggressive bowel meds without response.  Neostigmine with moderate stool but still with abdominal distension and discomfort.  NG placement complicated by by misplacement in the left lung   -TFs stopped, allow for bowel rest tonight  -switched dilt 60 mg po q 6 to 5 mg IV q 4  Her abdominal pain has worsened, she's nauseated, still bloated and with tinkling BS.  She has a hx of a cholecystectomy.  I'm quite concerned about an SBO  -stat CT abdomen     Hypotension   Did require transient phenylephrine support due to deep sedation     ? CAUTI  Hines cath placed for critical illness, UA 6/28 with inflammatory cells with large blood  UCx with  K with pseudomonas  -rec'd 2 days of liberty followed by 4 days of cefepime, abx completed 7/6    Hyperkalemia  Potassium bola to 5.5 and persisted in that range for a day or 2.  She received several doses of Lokelma and  "potassium level has now normalized.  Unclear etiology.  Renal function is normal.  Her PTA losartan has been on hold    HypernatremiaSignificant rise in sodium up to 160 with tube feed initiation.  Improved with high-dose free water via NG and D5, but remained at about 150.  Sodium is now normalized after dose of metolazone and Lasix drip.  -BMP tomorrow    Hypertension pta on dilt and losartan  dilt back on and being titrated  Losartan discontinued in the setting of hyperkalemia    Anemia  Drifting down during hospitalization and now at 6.8-otherwise hemodynamically stable. No clear bleeding noted.  Suspect critical illness/venipuncture mediated  -transfused single unit PRBC 7/8    Covid negative on admission  S/p 2 shot moderna series 5/2021      DVT Prophylaxis: DOAC  Code Status: Full Code  Functional Status:  Diet: TF for now, slp input 7/11  Hines: not in   Access PIVs    Disposition Plan   Expected discharge in tbd days to LTAC once bed found.     Entered: Miracle Charles MD 07/11/2023, 5:47 PM       Securely message with PROSimity (more info)  Text page via [email or web address] Paging/Directory       I spent 45 minutes reviewing epic including prior labs/imaging/medical history and notes related to this encounter  In addition time was spent in interveiwing the patient, communicating with contacts, and medical decision making      Interval History (Subjective):      Still thirsty but feels ok, breathing is fine.  Belly remains taut and no bm since last night                  Physical Exam:      Last Vital Signs:  BP (!) 153/87   Pulse 77   Temp 98.8  F (37.1  C) (Temporal)   Resp 18   Ht 1.6 m (5' 3\")   Wt 70.6 kg (155 lb 10.3 oz)   SpO2 98%   BMI 27.57 kg/m      I/O:  Extubated, trach in place, much calmer, nad looks stated age, looks incredibly weak.  Lungs coarse without wheeze rrr no mrg no le edema, maybe trace anasarca belly protuberant and bloated and distended but non tender skin warm and dry no cyanosis or " clubbing affect is calm            Medications:      All current medications were reviewed with changes reflected in problem list.         Data:      All new lab and imaging data was reviewed.   Labs:  Recent Labs   Lab 07/11/23  1547 07/11/23 0753 07/11/23 0524   NA  --   --  138   POTASSIUM  --   --  4.0   CHLORIDE  --   --  102   CO2  --   --  28   ANIONGAP  --   --  8   *   < > 109*   BUN  --   --  15.6   CR  --   --  0.48*   GFRESTIMATED  --   --  >90   EDIN  --   --  8.6    < > = values in this interval not displayed.     Recent Labs   Lab 07/11/23 0524   WBC 7.3   HGB 8.2*   HCT 26.5*   *        Recent Labs   Lab 07/11/23  1547 07/11/23  1153 07/11/23 0753 07/11/23  0524 07/11/23  0414   * 101* 96 109* 93     Recent Labs   Lab 07/09/23  0443   AST 25   ALT 78*   ALKPHOS 63   BILITOTAL 0.3      Imaging:   Results for orders placed or performed during the hospital encounter of 06/20/23   XR Chest Port 1 View    Narrative    EXAM: XR CHEST PORT 1 VIEW  LOCATION: [facility]  DATE: 6/21/2023    INDICATION: COPD  COMPARISON: 03/20/2023      Impression    IMPRESSION: Stable chest. Again seen is hyperinflation of both lungs. Chronic interstitial increased density in the upper left chest may represent aspirated barium or changes of calcification from old inflammation. Partially calcified thoracic aorta. No   inflammatory infiltrates or active CHF.   XR Chest Port 1 View    Narrative    XR CHEST PORT 1 VIEW 6/21/2023 12:32 PM    HISTORY: Endotracheal tube placement    COMPARISON: X-ray the same date at 0041 hours      Impression    IMPRESSION: The endotracheal tube tip is not well-seen but appears to  be approximately 7.6 cm above the stevie. Enteric tube with the distal  visualized portion below the diaphragm. No pneumothorax. No focal  pneumonic consolidation or pleural effusion. Stable clustered  calcified nodules in the left upper lobe. Normal heart  size.    KAYLA HUANG MD         SYSTEM ID:  [id number]   XR Abdomen Port 1 View    Narrative    XR ABDOMEN PORT 1 VIEW 6/21/2023 12:33 PM    HISTORY: NG placement    COMPARISON: Chest x-ray the same date      Impression    IMPRESSION: Interval placement of an enteric section tube with the tip  and side-port in satisfactory position within the gastric lumen.  Nonobstructive bowel gas pattern. Right upper quadrant surgical clips.  Prominent vascular calcifications.    KAYLA HUANG MD         SYSTEM ID:  [id number]   XR Chest Port 1 View    Narrative    EXAM: XR CHEST PORT 1 VIEW  LOCATION: [facility]  DATE: 6/21/2023    INDICATION: check ETT position  COMPARISON: 06/21/2023      Impression    IMPRESSION: Endotracheal tube in good position 4 cm above the stevie. NG tube courses below the diaphragm. The lungs are clear.   XR Chest Port 1 View    Narrative    CHEST ONE VIEW  6/23/2023 8:37 AM     HISTORY: Worsening ABGs and respiratory status on vent.    COMPARISON: June 21, 2023      Impression    IMPRESSION: Endotracheal tube tip 5 cm from the stevie. Right-sided  PICC tip in the low SVC. Trace pleural fluid or pleural thickening on  the left. No acute infiltrates. Calcifications at the left apex  stable.    GHAZALA ORELLANA MD         SYSTEM ID:  [id number]   XR Chest Port 1 View    Narrative    CHEST ONE VIEW  6/26/2023 9:29 AM     HISTORY: Increasing oxygen requirements and airway pressure.    COMPARISON: June 23, 2023      Impression    IMPRESSION: Endotracheal tube tip 4.3 cm from the stevie. Right PICC  line stable. Minimal pleural fluid or pleural thickening bilaterally  similar to previous. Small-to-moderate pneumothorax on the left new  from previous.    Results called to the patient's nurse on June 26, 2023 at 9:44 AM.     GHAZALA ORELLANA MD         SYSTEM ID:  [id number]   XR Chest Port 1 View    Narrative    CHEST ONE VIEW  6/26/2023 1:10 PM     HISTORY: Pneumothorax,  followup.    COMPARISON: June 26, 2023 at 9:15 AM.      Impression    IMPRESSION: Small to moderate left pneumothorax is not significantly  changed since the comparison study. Right PICC unchanged. No acute  infiltrates. Enteric tube tip approximately 4.6 cm from the stevie.    GHAZALA ORELLANA MD         SYSTEM ID:  [id number]   CT Chest Tube with Cath Placement    Narrative    CT CHEST TUBE WITH CATH PLACEMENT  6/26/2023 3:14 PM     HISTORY:  Patient is intubated and has a spontaneous left  pneumothorax.    COMPARISON: Chest x-ray dated 6/26/2023    FINDINGS: After obtaining informed consent, the patient was placed in  a supine position on the CT table. The left anterior chest was prepped  and draped in the usual sterile manner. 1% lidocaine was injected for  local anesthesia. Under CT guidance, using a 5 Albanian Yueh needle,  access into the left pleural space was obtained. A wire was curled in  the pleural space. Over the wire, an 8 Albanian locking pigtail catheter  was placed. Air was suctioned out of the left thorax. Follow-up CT  scan showed interval decrease in size of the left pneumothorax. The  catheter was sutured to the patient's skin and hooked to a pleura vac  suction apparatus.    The patient tolerated the procedure well. There were no immediate  postprocedure complications.      Impression    IMPRESSION: CT-guided left chest tube placed as above. Radiation dose  for this scan was reduced using automated exposure control, adjustment  of the mA and/or kV according to patient size, or iterative  reconstruction technique.    RODERICK EARL MD         SYSTEM ID:  [id number]   XR Chest Port 1 View    Narrative    CHEST PORTABLE 1 VIEW   6/27/2023 10:18 AM     HISTORY: Status post chest tube placement for pneumothorax.    COMPARISON: Chest x-ray on 6/26/2023.      Impression    IMPRESSION: Single AP view of the chest was obtained. Endotracheal  tube tip projects over the midthoracic trachea approximately 4 cm  from  the stevie. Enteric tube crosses the diaphragm with distal tip outside  the field of view. Right upper extremity PICC tip projects over the  high right atrium. Cardiomediastinal silhouette is within normal  limits. Significant interval decrease/almost complete resolution of  the previously seen left pneumothorax status post chest tube  placement. Multiple small radiodensity projects over the left lung  apex, likely represent areas of calcification seen on prior chest CTs.  Otherwise, no suspicious focal pulmonary opacities. No significant  pleural effusion or pneumothorax.    TENZIN SHEPHERD MD         SYSTEM ID:  [id number]   XR Abdomen Port 1 View    Narrative    XR ABDOMEN PORT 1 VIEW   6/28/2023 4:28 PM     HISTORY: confirm placement of feeding tube    COMPARISON: Abdominal radiograph 6/21/2023      Impression    IMPRESSION: No radiographic evidence of bowel obstruction. Interval  placement of feeding tube with tip overlying the expected position of  the ligament of Treitz. New left basilar pulmonary opacity, could  represent atelectasis or developing airspace consolidation, consider  dedicated chest radiograph. Bones are unchanged.    BRYON MARSHALL MD         SYSTEM ID:  [id number]   XR Chest Port 1 View    Narrative    CHEST ONE VIEW  6/30/2023 12:23 PM     HISTORY: Increasing airway pressure with history of pneumothorax.    COMPARISON: June 27, 2023      Impression    IMPRESSION: Endotracheal tube tip 5.5 cm from the stevie. Right PICC  tip in the low SVC. No pneumothorax.    GHAZALA ORELLANA MD         SYSTEM ID:  [id number]   XR Chest Port 1 View    Narrative    EXAM: XR CHEST PORT 1 VIEW  LOCATION: [facility]  DATE: 7/2/2023    INDICATION: ETT placement  COMPARISON: None.      Impression    IMPRESSION: Endotracheal tube tip 5.4 cm above the stevie in good position. Heart size and mediastinum are unchanged. Right PICC line with tip in the distal SVC. Increased left lower lobe  opacification suggesting increasing subsegmental atelectasis with   a left pleural effusion. No pneumothorax. Right lung is clear.   XR Chest Port 1 View    Narrative    EXAM: XR CHEST PORT 1 VIEW  LOCATION: [facility]  DATE: 7/4/2023    INDICATION: ventilator  COMPARISON: 07/02/2023      Impression    IMPRESSION: The ET tube is unchanged in position. The right PICC and enteric tube are unchanged in position. The heart is unchanged in size and contour. There is mild central pulmonary venous congestion. Mild interstitial edema seen in the lung bases   bilaterally.   XR Chest Port 1 View    Narrative    EXAM: XR CHEST PORT 1 VIEW  LOCATION: [facility]  DATE: 7/6/2023    INDICATION: Endotracheal tube positioning  COMPARISON: 07/04/2023      Impression    IMPRESSION: The ET tube and right PICC as well as the enteric tube are all unchanged in position. Left basilar subsegmental atelectasis is again noted, similar to prior exam.   XR Chest Port 1 View    Narrative    XR CHEST PORT 1 VIEW 7/7/2023 4:00 PM    HISTORY: Mechanical ventilation, paroxysmal atrial fibrillation,  tracheostomy tube placement    COMPARISON: X-ray 7/6/2023      Impression    IMPRESSION: Interval placement of a tracheostomy tube with the tip  located in satisfactory position 4.3 cm above the stevie. Stable  satisfactory right PICC line position. Enteric tube with the distal  visualized portion in the gastric lumen. Stable left-sided pleural  drainage catheter. No definite pneumothorax. Increased left basilar  pulmonary opacities which may reflect atelectasis or pneumonia. The  right lung is clear. Normal heart size. No vascular congestion.    KAYLA HUANG MD         SYSTEM ID:  [id number]

## 2023-07-11 NOTE — PLAN OF CARE
ICU End of Shift Summary.  For vital signs and complete assessments, please see documentation flowsheets.      Pertinent assessments: Pt alert, able to answer yes/no questions.Tries to mouth words at times. Tele SR. On CPAP in the evening and VC/AC while sleeping. VSS. Hines in place with low leelee output. Abdomen firm, tender, no BM, TF remained held.     Plan (Upcoming Events): Speech eval today  Discharge/Transfer Needs: tbd     Bedside Shift Report Completed : yes  Bedside Safety Check Completed: yes

## 2023-07-11 NOTE — CONSULTS
Care Management Initial Consult    General Information  Assessment completed with: Spouse or significant other, VM-chart review,    Type of CM/SW Visit: Initial Assessment    Primary Care Provider verified and updated as needed:     Readmission within the last 30 days:           Advance Care Planning:            Communication Assessment  Patient's communication style: spoken language (English or Bilingual)    Hearing Difficulty or Deaf: no   Wear Glasses or Blind: yes    Cognitive  Cognitive/Neuro/Behavioral: .WDL except  Level of Consciousness: alert  Arousal Level: opens eyes spontaneously  Orientation: other (see comments) (trach, answers questions by knoding head appropriately)  Mood/Behavior: cooperative, calm, behavior appropriate to situation  Best Language: 3 - Mute  Speech: trached    Living Environment:   People in home: spouse     Current living Arrangements: house      Able to return to prior arrangements:         Family/Social Support:  Care provided by:    Provides care for: no one  Marital Status:             Description of Support System: Supportive, Involved         Current Resources:   Patient receiving home care services: No     Community Resources:    Equipment currently used at home: none  Supplies currently used at home:      Employment/Financial:  Employment Status:          Financial Concerns:             Does the patient's insurance plan have a 3 day qualifying hospital stay waiver?  No    Lifestyle & Psychosocial Needs:  Social Determinants of Health     Tobacco Use: Low Risk  (6/21/2023)    Patient History      Smoking Tobacco Use: Never      Smokeless Tobacco Use: Never      Passive Exposure: Not on file   Alcohol Use: Not on file   Financial Resource Strain: Not on file   Food Insecurity: Not on file   Transportation Needs: Not on file   Physical Activity: Not on file   Stress: Not on file   Social Connections: Not on file   Intimate Partner Violence: Not on file    Depression: Not on file   Housing Stability: Not on file       Functional Status:  Prior to admission patient needed assistance:   Dependent ADLs:: Independent  Dependent IADLs:: Independent       Additional Information:  Consult placed for discharge planning. Patient admitted with acute on chronic respiratory failure resulting in intubation 6/21, failed extubation 7/6  and trach placement on 7/7.  Contacted spouse Merrill to discuss discharge planning. Patient lives at home with her spouse and prior to admit has been independent with all ADLs and IADLs. Discussed the next steps with Merrill and that patient may qualify to transfer to LTACH. Discussed what LTACH is and what criteria they look for when reviewing patients for acceptance. Offered sending referrals to [facility] and [facility] and he would prefer to have it sent to [facility].  Referral sent and contacted liaison who will begin chart review.    Will continue to follow for discharge planning.    Addendum 1540:  Received call from [facility] liaison and patient looks appropriate at this time for LTACH. They will start insurance auth process.      Bhakti Walters RN BSN OCN  Care Coordinator  [facility]  [phone]

## 2023-07-11 NOTE — PROGRESS NOTES
[facility] ICU VENTILATOR RESPIRATORY NOTE  Date of Admission: 6/21/23  Date of intubation (most recent): 7/6/23, Pt trached on 7/7/23  Reason for Mechanical Ventilation:  Respiratory Failure  Number of Days on Mechanical Ventilation: 6  Met Criteria for Pressure Support Trial: Yes  Length of Pressure Support Trial: Pt has been weaning since 0433 this am on PS 10/6, 30% FiO2.  Pt looks great while weaning and tolerated well.       Pt remains on vent with the following settings:   Vent Mode: CPAP/PS  (Continuous positive airway pressure with Pressure Support)  FiO2 (%): 30 %  Resp Rate (Set): 18 breaths/min  Tidal Volume (Set, mL): 350 mL  PEEP (cm H2O): 6 cmH2O  Pressure Support (cm H2O): 10 cmH2O  Resp: 18    SpO2 mid to high 90's, BS coarse and diminished. Suction for moderate thick cloudy white secretions, Xopenex/Atrovent/Pulmicort nebs given as ordered.   .   Plan: will continue to monitor and wean as able on Trach dome and trial PMV.    Idalia Sinclair, RT, RT  7/11/2023 6:42 PM

## 2023-07-11 NOTE — PROGRESS NOTES
Counts include 234 beds at the [facility] ICU VENTILATOR RESPIRATORY NOTE  Date of Admission:6/21/23  Date of intubation (most recent): 7/6/23, pt trached on 7/7/23  Reason for Mechanical Ventilation:  Respiratory Failure  Number of Days on Mechanical Ventilation: 5  Met Criteria for Pressure Support Trial: Yes  Length of Pressure Support Trial: Pt has been weaned for almost 16 hours on PS 10/8 30% FIO2.  Pt looks great while weaning, and tolerated well.      Pt remains on Vent Mode: (S) CMV/AC  (Continuous Mandatory Ventilation/ Assist Control)  FiO2 (%): 30 %  Resp Rate (Set): 18 breaths/min  Tidal Volume (Set, mL): 350 mL  PEEP (cm H2O): 8 cmH2O  Pressure Support (cm H2O): 10 cmH2O  Resp: 21    SpO2 mid to high 90's, BS coarse and diminished. Suction for moderate thick clear secretions.   Plan: will continue to monitor and wean as able.     Idalia Sinclair RT, RT  7/10/2023 10:45 PM

## 2023-07-11 NOTE — PROGRESS NOTES
[facility]    ICU Progress Note       Date of Admission:  6/20/2023    Assessment: Critical Care   Lara Melendez is a 57 year old female admitted on 6/20/2023. She has a history of COPD, emphysema, O2 dependent at home, tobacco dependence, hypertension, anxiety, atrial fibrillation on Eliquis, hypothyroidism presented with worsening shortness of breath and wheezing after having been prescribed azithromycin and prednisone prior to admission. COPD Exacerbation vs Status Asthmaticus in the ED,given IV steroids, IV magnesium nebulizers and BiPAP.  Admitted to the ICU, failed BiPAP and was intubated.  She had significant bronchospasm with high airway pressures requiring deep sedation with fentanyl, ketamine and propofol and finally a paralytic.  Has had several episodes of Afib with RVR (known history) for which she was cardioverted on 7/8 and again on 7/9.  Has been weaned off of paralytic, but failed extubation and was trached at bedside on 7/7 (sutures to be removed 7/12) and has since been slowly improving, however BAL on 7/7 was positive for Rhinovirus so patient remains on droplet precautions.   Developed an ileus 0n 7/10 but otherwise ready for LTACH placement.        Plan: Critical Care      Lara Melendez IS a 57 year old female admitted on 6/20/2023 for, acute respiratory failure secondary to COPD exacerbation, small left-sided pneumothorax (resolved) and history of atrial fibrillation on Eliquis. Ileus noted 7/10, promotility agents given.    I have personally reviewed the daily labs, imaging studies, cultures and discussed the case with referring physician and consulting physicians.      My assessment and plan by system for this patient is as follows:     Neurology/Psychiatry:   1. Off Sedation  2  Pain Mgt as needed.  Will try and reduce opioid in setting of ileus  3. ICU Delirium (improved)  4. Neuromuscular weakness of critical illness (improving)     Plan    Off  Precedex  Scheduled Acetaminophen .  Scheduled Oxy 5 mg q4  PT/OT: Up to chair  Tatyana's boot to bilateral feet      Cardiovascular:   1.Hemodynamics -normotensive to hypertensive  2.Rhythm currently sinus with PACs  3. A-fib with episodes of RVR: required cardioversion but now NSR     Plan  Rate controlled  Appreciate cardiology input  Continue p.o. amiodarone.   Continue p.o. cardizem  Mg >2.2  K+ >4  On Apixiban for anticoagulation, restarted 7/9 evening.        Pulmonary/Ventilator Management:   1. Trached, tolerating CPAP/PS much of the day but with thick secretions  2. Oxygenation/ventilation adequate     Plan     Vent Mode: (S) CPAP/PS  (Continuous positive airway pressure with Pressure Support)  FiO2 (%): 30 %  Resp Rate (Set): 18 breaths/min  Tidal Volume (Set, mL): 350 mL  PEEP (cm H2O): 8 cmH2O  Pressure Support (cm H2O): 10 cmH2O  Resp: 18     S/p Perc Trach 7/7/2023  Suture removal 7/12  Trach cares  Speech valve today  PS as tolerated        GI and Nutrition :   1.  Significant distension on exam (no discomfort) and Abd film shows what appears to be an ileus.       2.  Patient does not meet criteria for malnutrition at this time     Plan  - continue pro-motility agents, consider low dose neostigmine later today.  - Trickle feeds for now and follow.        Renal/Fluids/Electrolytes:   1.  No acute renal injury at this time  2.  Hypernatremia: resolved  3.  Last gas shows an appropriately compensated respiratory acidosis and patient continues to look alert and be responsive.  Will recheck if indicated.  4.  Appears hypervolemic      Intake/Output Summary (Last 24 hours) at 7/11/2023 0855  Last data filed at 7/11/2023 0400  Gross per 24 hour   Intake 611.55 ml   Output 985 ml   Net -373.45 ml     07/06 0700 - 07/11 0659  In: 9677.02 [I.V.:1707.02]  Out: 7520 [Urine:7480; Drains:40]  Net: 2157.02       Plan  - monitor function and electrolytes as needed with replacement per ICU protocols. - generally avoid  nephrotoxic agents such as NSAID, IV contrast unless specifically required  - adjust medications as needed for renal clearance  - follow I/O's as appropriate. Add a dose of lasix which may also help ileus.        Infectious Disease:   1.  Sepsis: Resolved              UTI: Pseudomonas aeruginosa              positive sputum culture: Klebsiella oxytoca, Rhinovirus  2. Rhinovirus              - Droplet precautions              - supportive cares     Plan  -Completed 10 day course of appropriate Abx        Endocrine:   1.  Concern for stress-induced hyperglycemia  2.  Concern for diabetes induced hyperglycemia     Plan  - ICU insulin protocol, goal sugar <180, sugars have been well controlled.        Hematology/Oncology:   1.  Leukocytosis: resolved  2.  Anemia: multifactorial  3.  Medication induced coagulopathy: On Eliquis for atrial fibrillation     Plan  - Transfuse for Hgb <7  - No evidence of bleed, restarted eliquis 7/9 evening        IV/Access:   1. Venous access -central access  2. Arterial access - n/a        Plan  - central access required and necessary        ICU Prophylaxis:   1. DVT: Eliquis; venodynes  2. VAP: HOB 30 degrees, chlorhexidine rinse  3. Stress Ulcer: PPI/H2 blocker  4. Restraints: None   5. Wound care: local wound care as needed  6. Feeding: continue tube feeds  7. Family Update:  updated via phone  8. Disposition:  ICU; Medically stable for LTAC     Lines/ tubes/ drains:  Hines Catheter: PRESENT, indication:  Neurogenic Bladder  Lines: PRESENT      PICC 06/22/23 Triple Lumen Right Brachial vein medial access/medications as ordered-Site Assessment: WDL      Prophylaxis:  - DVT Prophylaxis: DOAC and Pneumatic Compression Devices  - PUD Prophylaxis: PPI    Code Status: Full Code      Disposition:  - ICU, Ready for LTAC from medical standpoint    The patient's care was discussed with the Bedside Nurse, Patient, and Primary team.  Critical care time exclusive of procedures: 35    Royal  "MD Mumtaz  [facility]  Securely message with [employer] (more info)  Text page via [employer] Paging/Directory     Clinically Significant Risk Factors              # Hypoalbuminemia: Lowest albumin = 2.9 g/dL at 7/9/2023  4:43 AM, will monitor as appropriate     # Hypertension: Noted on problem list        # Overweight: Estimated body mass index is 27.57 kg/m  as calculated from the following:    Height as of this encounter: 1.6 m (5' 3\").    Weight as of this encounter: 70.6 kg (155 lb 10.3 oz).                ______________________________________________________________________    Interval History   Did very well yesterday and overbight.  More awake and alert without significant delirium.  Breathing easier with longer periods of CPAP/PS though still has thick secretions.    Physical Exam   Vital Signs: Temp: 98  F (36.7  C) Temp src: Temporal BP: 138/76 Pulse: 75   Resp: 18 SpO2: 100 % O2 Device: Mechanical Ventilator    Weight: 155 lbs 10.32 oz    GEN: no acute distress, responds appropriately, complains of some pain.  HEENT: head ncat, sclera anicteric, OP patent, trachea midline, trach site clean  PULM: Synchronous with vent, diffuse wheezes anteriorly but improving  CV/COR: Regularrate and rhythm S1S2 no gallop,  No rub, no murmur  ABD: Firm but nontender, hypoactive bowel sounds, no mass  EXT:  Little edema, warm  NEURO: Awake and alert, follows commands  SKIN: no obvious rash  LINES: clean, dry intact     Data   I reviewed all medications, new labs and imaging results over the last 24 hours.  Arterial Blood Gases   No lab results found in last 7 days.    Complete Blood Count   Recent Labs   Lab 07/11/23  0524 07/10/23  0359 07/09/23  0443 07/08/23  0416   WBC 7.3 7.2 7.1 6.3   HGB 8.2* 7.6* 7.6* 6.8*    151 148* 136*       Basic Metabolic Panel  Recent Labs   Lab 07/11/23  0753 07/11/23  0524 07/11/23  0414 07/11/23  0028 07/10/23  0809 07/10/23  0359 07/09/23  1237 07/09/23  0443 " 07/08/23  0800 07/08/23  0416   NA  --  138  --   --   --  139  --  140  --  140   POTASSIUM  --  4.0  --   --   --  4.0  --  3.8  --  4.1   CHLORIDE  --  102  --   --   --  104  --  105  --  103   CO2  --  28  --   --   --  29  --  29  --  30*   BUN  --  15.6  --   --   --  19.4  --  21.1  --  26.0*   CR  --  0.48*  --   --   --  0.40*  --  0.41*  --  0.42*   GLC 96 109* 93 94   < > 135*   < > 139*   < > 144*  149*    < > = values in this interval not displayed.       Liver Function Tests  Recent Labs   Lab 07/09/23 0443   AST 25   ALT 78*   ALKPHOS 63   BILITOTAL 0.3   ALBUMIN 2.9*       Pancreatic Enzymes  No lab results found in last 7 days.    Coagulation Profile  No lab results found in last 7 days.    IMAGING:  Recent Results (from the past 24 hour(s))   XR Chest Port 1 View    Narrative    CHEST PORTABLE ONE VIEW  July 10, 2023 9:09 AM     HISTORY: Recent trach, history of pneumonia and small pneumothorax.   Chest tube in place.    COMPARISON: Chest x-ray on 7/7/2023.      Impression    IMPRESSION: Single AP view of the chest was obtained. Tracheostomy  tube tip projects over mid thoracic trachea approximately 6 cm from  the stevie. Left chest tube remains in place. No significant  pneumothorax visualized. An enteric tube crosses the diaphragm with  the distal tip outside the field-of-view. Right upper extremity PICC  tip projecting over low SVC. Cardiomediastinal silhouette is within  normal limits. Mild left basilar/retrocardiac pulmonary opacities,  could represent atelectasis versus infection. No significant pleural  effusion.    TENZIN SHEPHERD MD         SYSTEM ID:  [id number]   XR Abdomen Port 2 Views    Narrative    XR PORTABLE ABDOMEN TWO VIEWS  7/10/2023 10:50 AM    History: Flat and decubitus. Bloated/nauseated, no bowel movement,  quiet bowel sounds. Evaluate for ileus    Comparison: Abdominal x-ray on 6/26/2023      Impression    IMPRESSION: Supine and left decubitus views of the abdomen and  pelvis  were obtained. Feeding tube distal tip projects over the third portion  of the duodenum. Dilated gas-filled colonic loops. A few air-fluid  levels seen on the decubitus view, could represent ileus versus  small-bowel obstruction. No evidence of free peritoneal or portal  venous gas.    TENZIN SHEPHERD MD         SYSTEM ID:  [id number]   XR Chest Port 1 View    Narrative    CHEST PORTABLE ONE VIEW   7/10/2023 2:18 PM     HISTORY: Took out chest tube, evaluate for any complications.    COMPARISON: Chest x-ray on 7/10/2023.      Impression    IMPRESSION: Single AP view of the chest was obtained. Tracheostomy  tube tip projects over mid thoracic trachea approximately 4.5 cm from  the stevie. Right upper extremity PICC tip projects over the high  right atrium. Cardiomediastinal silhouette is within normal limits.  Left basilar/retrocardiac pulmonary opacities, could represent small  pleural effusion or atelectasis/infection. No significant right  pleural effusion. No significant pneumothorax.     TENZIN SHEPHERD MD         SYSTEM ID:  [id number]

## 2023-07-12 ENCOUNTER — APPOINTMENT (OUTPATIENT)
Dept: SPEECH THERAPY | Facility: CLINIC | Age: 58
End: 2023-07-12
Attending: INTERNAL MEDICINE
Payer: COMMERCIAL

## 2023-07-12 ENCOUNTER — ANESTHESIA EVENT (OUTPATIENT)
Dept: SURGERY | Facility: CLINIC | Age: 58
End: 2023-07-12
Payer: COMMERCIAL

## 2023-07-12 ENCOUNTER — ANESTHESIA (OUTPATIENT)
Dept: SURGERY | Facility: CLINIC | Age: 58
End: 2023-07-12
Payer: COMMERCIAL

## 2023-07-12 PROBLEM — U07.1 INFECTION DUE TO 2019 NOVEL CORONAVIRUS: Status: RESOLVED | Noted: 2022-05-14 | Resolved: 2023-07-12

## 2023-07-12 LAB
ALBUMIN SERPL BCG-MCNC: 2.8 G/DL (ref 3.5–5.2)
ALP SERPL-CCNC: 82 U/L (ref 35–104)
ALT SERPL W P-5'-P-CCNC: 53 U/L (ref 0–50)
ANION GAP SERPL CALCULATED.3IONS-SCNC: 10 MMOL/L (ref 7–15)
AST SERPL W P-5'-P-CCNC: 28 U/L (ref 0–45)
BILIRUB SERPL-MCNC: 0.5 MG/DL
BUN SERPL-MCNC: 12.8 MG/DL (ref 8–23)
CALCIUM SERPL-MCNC: 8.3 MG/DL (ref 8.6–10)
CHLORIDE SERPL-SCNC: 101 MMOL/L (ref 98–107)
CREAT SERPL-MCNC: 0.46 MG/DL (ref 0.51–0.95)
DEPRECATED HCO3 PLAS-SCNC: 27 MMOL/L (ref 22–29)
ERYTHROCYTE [DISTWIDTH] IN BLOOD BY AUTOMATED COUNT: 16.8 % (ref 10–15)
GFR SERPL CREATININE-BSD FRML MDRD: >90 ML/MIN/1.73M2
GLUCOSE BLDC GLUCOMTR-MCNC: 103 MG/DL (ref 70–99)
GLUCOSE BLDC GLUCOMTR-MCNC: 76 MG/DL (ref 70–99)
GLUCOSE BLDC GLUCOMTR-MCNC: 76 MG/DL (ref 70–99)
GLUCOSE BLDC GLUCOMTR-MCNC: 83 MG/DL (ref 70–99)
GLUCOSE BLDC GLUCOMTR-MCNC: 85 MG/DL (ref 70–99)
GLUCOSE BLDC GLUCOMTR-MCNC: 96 MG/DL (ref 70–99)
GLUCOSE SERPL-MCNC: 94 MG/DL (ref 70–99)
HCT VFR BLD AUTO: 26.5 % (ref 35–47)
HGB BLD-MCNC: 8.6 G/DL (ref 11.7–15.7)
MAGNESIUM SERPL-MCNC: 1.9 MG/DL (ref 1.7–2.3)
MCH RBC QN AUTO: 32.5 PG (ref 26.5–33)
MCHC RBC AUTO-ENTMCNC: 32.5 G/DL (ref 31.5–36.5)
MCV RBC AUTO: 100 FL (ref 78–100)
PHOSPHATE SERPL-MCNC: 3 MG/DL (ref 2.5–4.5)
PLATELET # BLD AUTO: 181 10E3/UL (ref 150–450)
POTASSIUM SERPL-SCNC: 3.1 MMOL/L (ref 3.4–5.3)
POTASSIUM SERPL-SCNC: 4 MMOL/L (ref 3.4–5.3)
PROT SERPL-MCNC: 5.6 G/DL (ref 6.4–8.3)
RBC # BLD AUTO: 2.65 10E6/UL (ref 3.8–5.2)
SODIUM SERPL-SCNC: 138 MMOL/L (ref 136–145)
WBC # BLD AUTO: 6.9 10E3/UL (ref 4–11)

## 2023-07-12 PROCEDURE — 85027 COMPLETE CBC AUTOMATED: CPT | Performed by: INTERNAL MEDICINE

## 2023-07-12 PROCEDURE — 99291 CRITICAL CARE FIRST HOUR: CPT | Performed by: INTERNAL MEDICINE

## 2023-07-12 PROCEDURE — 94640 AIRWAY INHALATION TREATMENT: CPT | Mod: 76

## 2023-07-12 PROCEDURE — 250N000009 HC RX 250: Performed by: HOSPITALIST

## 2023-07-12 PROCEDURE — 92507 TX SP LANG VOICE COMM INDIV: CPT | Mod: GN

## 2023-07-12 PROCEDURE — 99233 SBSQ HOSP IP/OBS HIGH 50: CPT | Performed by: HOSPITALIST

## 2023-07-12 PROCEDURE — 250N000013 HC RX MED GY IP 250 OP 250 PS 637: Performed by: INTERNAL MEDICINE

## 2023-07-12 PROCEDURE — 94640 AIRWAY INHALATION TREATMENT: CPT

## 2023-07-12 PROCEDURE — 258N000003 HC RX IP 258 OP 636: Performed by: NURSE ANESTHETIST, CERTIFIED REGISTERED

## 2023-07-12 PROCEDURE — 250N000025 HC SEVOFLURANE, PER MIN: Performed by: COLON & RECTAL SURGERY

## 2023-07-12 PROCEDURE — 80053 COMPREHEN METABOLIC PANEL: CPT | Performed by: INTERNAL MEDICINE

## 2023-07-12 PROCEDURE — 83735 ASSAY OF MAGNESIUM: CPT | Performed by: INTERNAL MEDICINE

## 2023-07-12 PROCEDURE — 250N000009 HC RX 250: Performed by: INTERNAL MEDICINE

## 2023-07-12 PROCEDURE — 370N000017 HC ANESTHESIA TECHNICAL FEE, PER MIN: Performed by: COLON & RECTAL SURGERY

## 2023-07-12 PROCEDURE — 84132 ASSAY OF SERUM POTASSIUM: CPT | Performed by: INTERNAL MEDICINE

## 2023-07-12 PROCEDURE — 360N000076 HC SURGERY LEVEL 3, PER MIN: Performed by: COLON & RECTAL SURGERY

## 2023-07-12 PROCEDURE — 272N000001 HC OR GENERAL SUPPLY STERILE: Performed by: COLON & RECTAL SURGERY

## 2023-07-12 PROCEDURE — 272N000272 HC CONTINUOUS NEBULIZER MICRO PUMP

## 2023-07-12 PROCEDURE — 999N000009 HC STATISTIC AIRWAY CARE

## 2023-07-12 PROCEDURE — 200N000001 HC R&B ICU

## 2023-07-12 PROCEDURE — 250N000009 HC RX 250: Performed by: COLON & RECTAL SURGERY

## 2023-07-12 PROCEDURE — 250N000011 HC RX IP 250 OP 636: Mod: JZ | Performed by: INTERNAL MEDICINE

## 2023-07-12 PROCEDURE — 250N000013 HC RX MED GY IP 250 OP 250 PS 637: Performed by: SURGERY

## 2023-07-12 PROCEDURE — C9113 INJ PANTOPRAZOLE SODIUM, VIA: HCPCS | Mod: JZ | Performed by: HOSPITALIST

## 2023-07-12 PROCEDURE — 999N000157 HC STATISTIC RCP TIME EA 10 MIN

## 2023-07-12 PROCEDURE — 0D1L0Z4 BYPASS TRANSVERSE COLON TO CUTANEOUS, OPEN APPROACH: ICD-10-PCS | Performed by: COLON & RECTAL SURGERY

## 2023-07-12 PROCEDURE — 84100 ASSAY OF PHOSPHORUS: CPT | Performed by: INTERNAL MEDICINE

## 2023-07-12 PROCEDURE — 250N000011 HC RX IP 250 OP 636: Mod: JZ | Performed by: HOSPITALIST

## 2023-07-12 PROCEDURE — 272N000054 HC CANNULA HIGH FLOW, ADULT

## 2023-07-12 PROCEDURE — 92597 ORAL SPEECH DEVICE EVAL: CPT | Mod: GN

## 2023-07-12 PROCEDURE — 94003 VENT MGMT INPAT SUBQ DAY: CPT

## 2023-07-12 PROCEDURE — 250N000011 HC RX IP 250 OP 636: Performed by: NURSE ANESTHETIST, CERTIFIED REGISTERED

## 2023-07-12 PROCEDURE — 999N000215 HC STATISTIC HFNC ADULT NON-CPAP

## 2023-07-12 PROCEDURE — 250N000009 HC RX 250: Performed by: NURSE ANESTHETIST, CERTIFIED REGISTERED

## 2023-07-12 RX ORDER — LIDOCAINE 40 MG/G
CREAM TOPICAL
Status: CANCELLED | OUTPATIENT
Start: 2023-07-12

## 2023-07-12 RX ORDER — ONDANSETRON 2 MG/ML
4 INJECTION INTRAMUSCULAR; INTRAVENOUS EVERY 6 HOURS PRN
Status: CANCELLED | OUTPATIENT
Start: 2023-07-12

## 2023-07-12 RX ORDER — OXYCODONE HYDROCHLORIDE 10 MG/1
10 TABLET ORAL EVERY 4 HOURS PRN
Status: CANCELLED | OUTPATIENT
Start: 2023-07-12

## 2023-07-12 RX ORDER — FENTANYL CITRATE 50 UG/ML
INJECTION, SOLUTION INTRAMUSCULAR; INTRAVENOUS PRN
Status: DISCONTINUED | OUTPATIENT
Start: 2023-07-12 | End: 2023-07-12

## 2023-07-12 RX ORDER — POTASSIUM CHLORIDE 29.8 MG/ML
20 INJECTION INTRAVENOUS
Status: COMPLETED | OUTPATIENT
Start: 2023-07-12 | End: 2023-07-12

## 2023-07-12 RX ORDER — LEVOTHYROXINE SODIUM 20 UG/ML
88 INJECTION, SOLUTION INTRAVENOUS DAILY
Status: DISCONTINUED | OUTPATIENT
Start: 2023-07-12 | End: 2023-07-14

## 2023-07-12 RX ORDER — ONDANSETRON 4 MG/1
4 TABLET, ORALLY DISINTEGRATING ORAL EVERY 6 HOURS PRN
Status: CANCELLED | OUTPATIENT
Start: 2023-07-12

## 2023-07-12 RX ORDER — SODIUM CHLORIDE, SODIUM LACTATE, POTASSIUM CHLORIDE, CALCIUM CHLORIDE 600; 310; 30; 20 MG/100ML; MG/100ML; MG/100ML; MG/100ML
INJECTION, SOLUTION INTRAVENOUS CONTINUOUS
Status: CANCELLED | OUTPATIENT
Start: 2023-07-12

## 2023-07-12 RX ORDER — ENOXAPARIN SODIUM 100 MG/ML
40 INJECTION SUBCUTANEOUS EVERY 24 HOURS
Status: DISCONTINUED | OUTPATIENT
Start: 2023-07-12 | End: 2023-07-16

## 2023-07-12 RX ORDER — ENOXAPARIN SODIUM 100 MG/ML
40 INJECTION SUBCUTANEOUS EVERY 24 HOURS
Status: CANCELLED | OUTPATIENT
Start: 2023-07-13

## 2023-07-12 RX ORDER — SODIUM CHLORIDE 9 MG/ML
INJECTION, SOLUTION INTRAVENOUS CONTINUOUS PRN
Status: DISCONTINUED | OUTPATIENT
Start: 2023-07-12 | End: 2023-07-12

## 2023-07-12 RX ORDER — MAGNESIUM SULFATE HEPTAHYDRATE 40 MG/ML
2 INJECTION, SOLUTION INTRAVENOUS ONCE
Status: COMPLETED | OUTPATIENT
Start: 2023-07-12 | End: 2023-07-12

## 2023-07-12 RX ORDER — DIPHENHYDRAMINE HYDROCHLORIDE 50 MG/ML
25 INJECTION INTRAMUSCULAR; INTRAVENOUS EVERY 6 HOURS PRN
Status: CANCELLED | OUTPATIENT
Start: 2023-07-12

## 2023-07-12 RX ORDER — BUPIVACAINE HYDROCHLORIDE AND EPINEPHRINE 5; 5 MG/ML; UG/ML
INJECTION, SOLUTION EPIDURAL; INTRACAUDAL; PERINEURAL PRN
Status: DISCONTINUED | OUTPATIENT
Start: 2023-07-12 | End: 2023-07-12 | Stop reason: HOSPADM

## 2023-07-12 RX ORDER — CEFAZOLIN SODIUM 1 G/3ML
INJECTION, POWDER, FOR SOLUTION INTRAMUSCULAR; INTRAVENOUS PRN
Status: DISCONTINUED | OUTPATIENT
Start: 2023-07-12 | End: 2023-07-12

## 2023-07-12 RX ORDER — OXYCODONE HYDROCHLORIDE 5 MG/1
5 TABLET ORAL EVERY 4 HOURS PRN
Status: CANCELLED | OUTPATIENT
Start: 2023-07-12

## 2023-07-12 RX ORDER — LIDOCAINE HYDROCHLORIDE 20 MG/ML
INJECTION, SOLUTION INFILTRATION; PERINEURAL PRN
Status: DISCONTINUED | OUTPATIENT
Start: 2023-07-12 | End: 2023-07-12

## 2023-07-12 RX ADMIN — FENTANYL CITRATE 20 MCG: 50 INJECTION, SOLUTION INTRAMUSCULAR; INTRAVENOUS at 06:16

## 2023-07-12 RX ADMIN — BUDESONIDE 0.5 MG: 0.5 INHALANT ORAL at 20:22

## 2023-07-12 RX ADMIN — POTASSIUM CHLORIDE 20 MEQ: 29.8 INJECTION, SOLUTION INTRAVENOUS at 10:38

## 2023-07-12 RX ADMIN — AMIODARONE HYDROCHLORIDE 200 MG: 200 TABLET ORAL at 09:20

## 2023-07-12 RX ADMIN — ROCURONIUM BROMIDE 40 MG: 50 INJECTION, SOLUTION INTRAVENOUS at 06:16

## 2023-07-12 RX ADMIN — HYDROMORPHONE HYDROCHLORIDE 1 MG: 1 INJECTION, SOLUTION INTRAMUSCULAR; INTRAVENOUS; SUBCUTANEOUS at 06:32

## 2023-07-12 RX ADMIN — PANTOPRAZOLE SODIUM 40 MG: 40 INJECTION, POWDER, FOR SOLUTION INTRAVENOUS at 09:20

## 2023-07-12 RX ADMIN — FENTANYL CITRATE 30 MCG: 50 INJECTION, SOLUTION INTRAMUSCULAR; INTRAVENOUS at 06:12

## 2023-07-12 RX ADMIN — LEVALBUTEROL HYDROCHLORIDE 0.63 MG: 0.63 SOLUTION RESPIRATORY (INHALATION) at 11:35

## 2023-07-12 RX ADMIN — CEFAZOLIN 2 G: 1 INJECTION, POWDER, FOR SOLUTION INTRAMUSCULAR; INTRAVENOUS at 06:20

## 2023-07-12 RX ADMIN — SODIUM CHLORIDE: 9 INJECTION, SOLUTION INTRAVENOUS at 06:01

## 2023-07-12 RX ADMIN — POTASSIUM CHLORIDE 20 MEQ: 29.8 INJECTION, SOLUTION INTRAVENOUS at 09:19

## 2023-07-12 RX ADMIN — IPRATROPIUM BROMIDE 0.5 MG: 0.5 SOLUTION RESPIRATORY (INHALATION) at 11:35

## 2023-07-12 RX ADMIN — LEVOTHYROXINE SODIUM 88 MCG: 20 INJECTION, SOLUTION INTRAVENOUS at 10:38

## 2023-07-12 RX ADMIN — LEVALBUTEROL HYDROCHLORIDE 0.63 MG: 0.63 SOLUTION RESPIRATORY (INHALATION) at 08:10

## 2023-07-12 RX ADMIN — MAGNESIUM OXIDE TAB 400 MG (241.3 MG ELEMENTAL MG) 400 MG: 400 (241.3 MG) TAB at 09:20

## 2023-07-12 RX ADMIN — MAGNESIUM SULFATE HEPTAHYDRATE 2 G: 2 INJECTION, SOLUTION INTRAVENOUS at 08:31

## 2023-07-12 RX ADMIN — DILTIAZEM HYDROCHLORIDE 5 MG: 5 INJECTION, SOLUTION INTRAVENOUS at 04:53

## 2023-07-12 RX ADMIN — LEVALBUTEROL HYDROCHLORIDE 0.63 MG: 0.63 SOLUTION RESPIRATORY (INHALATION) at 20:22

## 2023-07-12 RX ADMIN — DILTIAZEM HYDROCHLORIDE 5 MG: 5 INJECTION, SOLUTION INTRAVENOUS at 21:23

## 2023-07-12 RX ADMIN — FENTANYL CITRATE 50 MCG: 50 INJECTION, SOLUTION INTRAMUSCULAR; INTRAVENOUS at 06:14

## 2023-07-12 RX ADMIN — NICOTINE 1 PATCH: 14 PATCH, EXTENDED RELEASE TRANSDERMAL at 08:31

## 2023-07-12 RX ADMIN — DILTIAZEM HYDROCHLORIDE 5 MG: 5 INJECTION, SOLUTION INTRAVENOUS at 17:06

## 2023-07-12 RX ADMIN — IPRATROPIUM BROMIDE 0.5 MG: 0.5 SOLUTION RESPIRATORY (INHALATION) at 15:43

## 2023-07-12 RX ADMIN — BUDESONIDE 0.5 MG: 0.5 INHALANT ORAL at 08:10

## 2023-07-12 RX ADMIN — DILTIAZEM HYDROCHLORIDE 5 MG: 5 INJECTION, SOLUTION INTRAVENOUS at 09:20

## 2023-07-12 RX ADMIN — IPRATROPIUM BROMIDE 0.5 MG: 0.5 SOLUTION RESPIRATORY (INHALATION) at 20:20

## 2023-07-12 RX ADMIN — LIDOCAINE HYDROCHLORIDE 30 MG: 20 INJECTION, SOLUTION INFILTRATION; PERINEURAL at 06:12

## 2023-07-12 RX ADMIN — DILTIAZEM HYDROCHLORIDE 5 MG: 5 INJECTION, SOLUTION INTRAVENOUS at 13:42

## 2023-07-12 RX ADMIN — IPRATROPIUM BROMIDE 0.5 MG: 0.5 SOLUTION RESPIRATORY (INHALATION) at 08:10

## 2023-07-12 RX ADMIN — DILTIAZEM HYDROCHLORIDE 5 MG: 5 INJECTION, SOLUTION INTRAVENOUS at 02:05

## 2023-07-12 RX ADMIN — ENOXAPARIN SODIUM 40 MG: 40 INJECTION SUBCUTANEOUS at 20:08

## 2023-07-12 RX ADMIN — LEVALBUTEROL HYDROCHLORIDE 0.63 MG: 0.63 SOLUTION RESPIRATORY (INHALATION) at 15:43

## 2023-07-12 ASSESSMENT — COPD QUESTIONNAIRES: COPD: 1

## 2023-07-12 ASSESSMENT — ACTIVITIES OF DAILY LIVING (ADL)
ADLS_ACUITY_SCORE: 40
ADLS_ACUITY_SCORE: 36
ADLS_ACUITY_SCORE: 40
ADLS_ACUITY_SCORE: 36
ADLS_ACUITY_SCORE: 40
ADLS_ACUITY_SCORE: 40
ADLS_ACUITY_SCORE: 36
ADLS_ACUITY_SCORE: 40
ADLS_ACUITY_SCORE: 36
ADLS_ACUITY_SCORE: 38

## 2023-07-12 ASSESSMENT — ENCOUNTER SYMPTOMS: DYSRHYTHMIAS: 1

## 2023-07-12 NOTE — PROGRESS NOTES
[facility] ICU VENTILATOR RESPIRATORY NOTE    Date of Admission: 6/21/23    Date of intubation (most recent): 7/6/23, Pt trached on 7/7/23    Reason for Mechanical Ventilation:  Respiratory Failure    Number of Days on Mechanical Ventilation: 7    Met Criteria for Pressure Support Trial: Yes, although pt is going into surgery. No trial done at this time.    Length of Pressure Support Trial: NA    Vent Mode: CMV/AC  (Continuous Mandatory Ventilation/ Assist Control)  FiO2 (%): 30 %  Resp Rate (Set): 18 breaths/min  Tidal Volume (Set, mL): 350 mL  PEEP (cm H2O): 6 cmH2O  Pressure Support (cm H2O): 10 cmH2O  Resp: 18    Bs coarse/diminished. Xopenex/Atrovent and pulmicort given in line. Trach cares done and inner cannula changed. Will continue to assess and monitor.    Renny Gamboa RT on 7/12/2023 at 5:17 AM

## 2023-07-12 NOTE — PROGRESS NOTES
Patient CT abdomen and pelvis showed intestinal obstruction and severely dilated colon. With possible ischemic changes.     -Case was discussed with [place] ractal surgery. I Also discussed case with the  and the patient and explained the need for surgery. They ahd agreed for the procedure and patient will be take nto OR.     -I examined the patient using tele monitor. Significant pain on palpation was elicited alconine nurse examined the abdomen.     [facility]  CRITICAL CARE STAFF NOTE    I am managing these acute and ongoing critical issues resulting in critical condition that impairs one or more vital organ systems, incur a high probability of imminent or life-threatening deterioration in the patient's condition and providing frequent personal assessment and manipulation of medications and life support equipment.     1.  Ischemic colon   2. Respiratroy failure requiring mechanical ventilation     ICU Interventions: ventilator management and interpretation of lab values, cardiac output, cxr, pulse oximetry, blood gases, and/or information/data stored in computers    The patient was seen and examined with the resident/fellow/JENIFFER and/or medical student.  We have discussed the patient in detail and I agree with the findings, assessment, and plan as documented when this note was cosigned on this day. The plan was formulated in conjunction with pharmacy, ICU nurses, and respiratory therapist. I have evaluated all laboratory values and imaging studies for the past 24 hours. I have reviewed all the consults that have been ordered and are active for this patient.      Critical Care Time:90  min.  I spent this time (excluding procedures) personally providing and directing critical care services at the bedside and on the critical care unit.      David CHARLES MPH   of Medicine  Pager: [phone]    Clinically Significant Risk Factors        # Hypokalemia: Lowest K = 3.1 mmol/L in last 2  "days, will replace as needed       # Hypoalbuminemia: Lowest albumin = 2.8 g/dL at 7/12/2023  4:43 AM, will monitor as appropriate     # Hypertension: Noted on problem list        # Overweight: Estimated body mass index is 27.57 kg/m  as calculated from the following:    Height as of this encounter: 1.6 m (5' 3\").    Weight as of this encounter: 70.6 kg (155 lb 10.3 oz).                "

## 2023-07-12 NOTE — PROGRESS NOTES
[facility]    Hospitalist Progress Note      Assessment & Plan   Lara Melendez is a 57 year old female with a history of COPD on prn home oxygen 2-3 L/nc, tobacco dependence, htn/hlp, PAF chronically on apixaban, anxiety, hypothyroidism, psoriasis admitted on 6/20/2023 with SOB and wheezing.    Patient has had a protracted admission here at [facility].  Her symptoms reportedly started a few days prior to admission and she called her pulmonologist who initiated empiric azithromycin and prednisone.  Symptoms continued to worsen so EMS was called and she was brought to the ER.  Her respiratory status continued to decline and she required intubation the day after admission.  She has been treated for community-acquired pneumonia along with COPD exacerbation with steroids and antibiotics.  While on the ventilator, patient had significant bronchospasm and high airway pressures requiring deep sedation and paralysis.  [facility] was actually consulted for possibility of ECMO but she was not deemed an appropriate candidate.  She was also found to have a large pneumothorax on 6/26 with chest tube placed by IR which has subsequently been removed (pulled on 7/10).  She has also had recurrent A-fib with RVR requiring both cardioversion and diltiazem with amiodarone.  She was continued on treatment with steroids, nebs, antibiotics and aggressive diuresis.  Paralytics were able to be weaned off but she did require tracheostomy for long-term ventilator weaning.     Patient had worsening abdominal distention on 7/11.  She had a CT scan done that was concerning for large bowel obstruction.  Colorectal surgery was consulted and she underwent surgical repair in the early a.m. of 7/12.     #Acute on chronic hypoxic and hypercapnic respiratory failure. COPD with acute exacerbation chronically on 2-3 L/nc prn. Left-sided ptx. PNA with sputum + for klebsiella oxytoca. Pulmonary edema 2/2 fluid resuscitation for  sepsis  Complicated respiratory course-initially intubated due to progressive decline in respiratory function after admission-high airway pressures from severe bronchospasm required deep sedation including paralysis with vec.  She was also treated with 24 hours of continuous albuterol neb.  Lower respiratory track positive for Klebsiella as above along with rhinovirus.  -Patient completed steroid course.  She also completed course of IV antibiotics.  -Chest tube placed by IR on 6/26 for pneumothorax, subsequently removed on 7/10.  -Continue on DuoNebs.  Appreciate pulmonary consultation.  -IV Lasix as needed for volume overload.  Likely euvolemic at this point in the setting of postop status.  -Will need LTAC for recovery   -Appreciate intensivist assistance with management of ventilator.  -Droplet precautions    #Ileus. Large bowel obstruction s/p diverting colostomy: First tried bowel meds without improvement.  Patient had worsening abdominal distention and pain.  CT abdomen pelvis done on 7/12 showed colonic dilation with abrupt termination at the mid sigmoid colon with suggestion of pneumatosis.  Colorectal surgery was consulted.  Underwent operative repair early a.m. of 7/12.  Seem to tolerate well.  Appreciate colorectal surgery for postoperative cares.    -N.p.o., holding off on IV fluids as has needed Lasix for volume status.  Defer diet initiation to colorectal team.  -Holding some nonessential medications.  We will continue amiodarone and diltiazem.  Converted levothyroxine IV formulation at slightly reduced dose..     #Paroxysmal AF/SVT: Pta on diltiazem and apixaban (BB being avoided due to severe COPD). Required DCCV x 2 during this hospitalization.    -Continue on oral amiodarone along with diltiazem.  Holding DOAC with recent surgery.  Resume once able per colorectal.  Switch to Lovenox for prophylaxis.     #Sepsis. Elevated lactate: Her hospital course has been peppered with episodic sepsis with  leukocytosis/tachycardia/fever/respiratory failure etc  -resolved  -elevated lactate due to sepsis, and nebs      #Hypotension: Did require transient phenylephrine support due to deep sedation. Now resolved.      #CAUTI.  Pseudomonas UTI: Hines cath placed for critical illness, UA 6/28 with inflammatory cells with large blood. UCx with  K with pseudomonas  -rec'd 2 days of liberty followed by 4 days of cefepime, abx completed 7/6  -We will keep Hines in place for now given postoperative status.  Likely remove for voiding trial in the coming days.     #Hyperkalemia. Hypokalemia: Potassium bola to 5.5 and persisted in that range for a day or 2.  She received several doses of Lokelma and potassium level has now normalized and actually downtrending.  Unclear etiology.  Renal function is normal.  Her PTA losartan has been on hold.  Replacement as needed.  Monitor.     #Hypernatremia: Significant rise in sodium up to 160 with tube feed initiation.  Improved with high-dose free water via NG and D5, but remained at about 150.  Sodium is now normalized after dose of metolazone and Lasix drip.  Monitoring     #Hypertension pta on dilt and losartan. dilt back on and being titrated. Losartan discontinued in the setting of hyperkalemia     #Anemia  Drifting down during hospitalization and now at 6.8-otherwise hemodynamically stable. No clear bleeding noted.  Suspect critical illness/venipuncture mediated  -transfused single unit PRBC 7/8     DVT Prophylaxis: Lovenox postoperatively.  Holding DOAC per colorectal  Code Status: Full Code  Lines: Peripheral IVs, Hines catheter is in place postoperatively.  Hopefully we will be able to remove in the coming days.  Dispo: Continue ICU cares    Discussed with intensivist.  Discussed with bedside nurse.    I did update , Luis, by phone on 7/12.     Tommie Ren MD    Interval History   Assumed care.  Patient seen postoperatively.  Patient notes she is feeling okay.  No  significant pain.  No nausea vomiting.  Ostomy bag in place.  Breathing is stable.    -Data reviewed today: I reviewed all new labs and imaging results over the last 24 hours.     Physical Exam   Temp: 98.2  F (36.8  C) Temp src: Oral BP: 104/54 Pulse: 71   Resp: 21 SpO2: 100 % O2 Device: Trach dome Oxygen Delivery: 35 LPM  Vitals:    07/09/23 0552 07/10/23 0600 07/11/23 0500   Weight: 69.9 kg (154 lb 1.6 oz) 69.6 kg (153 lb 7 oz) 70.6 kg (155 lb 10.3 oz)     Vital Signs with Ranges  Temp:  [97.1  F (36.2  C)-98.3  F (36.8  C)] 98.2  F (36.8  C)  Pulse:  [60-90] 71  Resp:  [13-27] 21  BP: ()/(54-99) 104/54  FiO2 (%):  [30 %] 30 %  SpO2:  [93 %-100 %] 100 %  I/O last 3 completed shifts:  In: 240 [NG/GT:240]  Out: 2445 [Urine:2440; Blood:5]    Constitutional: No acute distress.  Awakens to voice.  Answers appropriately.  HEENT: Normocephalic.  Trach in place.  Trach site appears clean.  Respiratory: Moving air bilaterally.  Bilateral expiratory wheezing noted.  Prolonged expiratory phase.  Cardiovascular: Nontachycardic, irregular, no murmur  GI: Ostomy in place with some gas noted.  Bowel sounds not appreciated.  Skin/Integumen: WWP, no rash. No edema  Neuro: Awake.  Answers appropriately.  Follows commands.    Medications     dexmedetomidine Stopped (07/10/23 1255)     lactated ringers Stopped (06/28/23 0011)     - MEDICATION INSTRUCTIONS -         amiodarone  200 mg Oral or Feeding Tube Daily     [Held by provider] apixaban ANTICOAGULANT  5 mg Per Feeding Tube BID     [Held by provider] atorvastatin  20 mg Per Feeding Tube Daily     budesonide  0.5 mg Nebulization BID     diltiazem  5 mg Intravenous Q4H     ipratropium  0.5 mg Nebulization 4x daily     [Held by provider] lactulose  20 g Oral BID     levalbuterol  0.63 mg Nebulization 4x Daily     [Held by provider] levothyroxine  112 mcg Per Feeding Tube Daily     levothyroxine  88 mcg Intravenous Daily     magnesium oxide  400 mg Oral or Feeding Tube Daily      menthol   Transdermal Q8H     [Held by provider] multivitamins w/minerals  15 mL Per Feeding Tube Daily     nicotine  1 patch Transdermal Daily     nicotine   Transdermal Q8H     [Held by provider] oxyCODONE  5 mg Per Feeding Tube Q4H     [Held by provider] pantoprazole  40 mg Per Feeding Tube QAM AC     pantoprazole  40 mg Intravenous Daily with breakfast     [Held by provider] PARoxetine  20 mg Per Feeding Tube Daily     [Held by provider] protein modular  1 packet Per Feeding Tube Daily     [Held by provider] senna-docusate  2 tablet Oral BID     [Held by provider] simethicone  133 mg Oral TID     sodium chloride (PF)  10-40 mL Intracatheter Q8H     sodium chloride (PF)  3 mL Intracatheter Q8H       Data   Recent Labs   Lab 07/12/23  1137 07/12/23  0734 07/12/23  0443 07/11/23  0753 07/11/23  0524 07/10/23  0809 07/10/23  0359 07/09/23  1237 07/09/23  0443   WBC  --   --  6.9  --  7.3  --  7.2  --  7.1   HGB  --   --  8.6*  --  8.2*  --  7.6*  --  7.6*   MCV  --   --  100  --  104*  --  103*  --  100   PLT  --   --  181  --  177  --  151  --  148*   NA  --   --  138  --  138  --  139  --  140   POTASSIUM  --   --  3.1*  --  4.0  --  4.0  --  3.8   CHLORIDE  --   --  101  --  102  --  104  --  105   CO2  --   --  27  --  28  --  29  --  29   BUN  --   --  12.8  --  15.6  --  19.4  --  21.1   CR  --   --  0.46*  --  0.48*  --  0.40*  --  0.41*   ANIONGAP  --   --  10  --  8  --  6*  --  6*   EDIN  --   --  8.3*  --  8.6  --  8.3*  --  8.4*   GLC 83 96 94   < > 109*   < > 135*   < > 139*   ALBUMIN  --   --  2.8*  --   --   --   --   --  2.9*   PROTTOTAL  --   --  5.6*  --   --   --   --   --  5.7*   BILITOTAL  --   --  0.5  --   --   --   --   --  0.3   ALKPHOS  --   --  82  --   --   --   --   --  63   ALT  --   --  53*  --   --   --   --   --  78*   AST  --   --  28  --   --   --   --   --  25    < > = values in this interval not displayed.       Recent Results (from the past 24 hour(s))   XR Abdomen Port 1  View    Narrative    XR ABDOMEN PORT 1 VIEW   7/11/2023 4:24 PM     HISTORY: Ileus unable to pass NG at bedside-need NG not feeding tube    COMPARISON: Abdominal x-ray on 7/10/2023.      Impression    IMPRESSION: Single frontal view of the lower chest and upper abdomen  was obtained. Feeding tube distal tip projects over the third portion  of the duodenum. Nasogastric tube tip and sideholes project over the  left main bronchus, recommend repositioning and repeating the x-ray.  Multiple gas distended bowel loops in the visualized upper abdomen.    TENZIN SHEPHERD MD         SYSTEM ID:  [id number]   XR Chest Port 1 View    Narrative    EXAM: XR CHEST PORT 1 VIEW  LOCATION: [facility]  DATE: 7/11/2023    INDICATION: NG inadvertently went into the lungs.  COMPARISON: Chest radiograph 07/10/2023.       Impression    IMPRESSION:    Unchanged tracheostomy tube with tip at the thoracic inlet. Feeding tube descends along the course of the esophagus with tip below the diaphragm and out of the field-of-view. Unchanged right PICC with tip in the lower SVC.    Lung volumes remain low with slight elevation of the left hemidiaphragm and adjacent left basilar atelectasis. Multiple small opacities at the left apex are unchanged and could either represent calcifications or aspirated barium. No new airspace   opacities, pleural effusions, or pneumothorax.    Stable, nonenlarged cardiac silhouette.       CT Abdomen Pelvis w Contrast    Narrative    EXAM: CT ABDOMEN PELVIS W CONTRAST  LOCATION: [facility]  DATE: 7/11/2023    INDICATION: abdominal pain nausea, distension, strong concern for SBO  COMPARISON: None.  TECHNIQUE: CT scan of the abdomen and pelvis was performed following injection of IV contrast. Multiplanar reformats were obtained. Dose reduction techniques were used.  CONTRAST: 78mL Isovue 370    FINDINGS:   LOWER CHEST: Small left effusion with atelectasis. Trace right effusion  with atelectasis.    HEPATOBILIARY: Liver is within normal limits. Cholecystectomy.    PANCREAS: Normal.    SPLEEN: Normal.    ADRENAL GLANDS: Normal.    KIDNEYS/BLADDER: No significant mass, stone, or hydronephrosis.    BOWEL: The colon is dilated with the cecum measuring approximately 6.6 m, ascending colon measuring approximately 7.7 cm, transverse colon measuring 7.4 cm and descending colon measuring 5.6 cm. The colon abruptly ends in the mid sigmoid colon where   there is extensive diverticulosis. There is a suggestion of pneumatosis within both the ascending and descending colon.    LYMPH NODES: Normal.    VASCULATURE: Unremarkable.    PELVIC ORGANS: Bladder is decompressed. Uterus within normal limits. The fluid noted within the pelvis. Mild anasarca involving the soft tissues of the pelvis.    MUSCULOSKELETAL: Degenerative changes of the spine.      Impression    IMPRESSION:   1.  The colon is dilated from the cecum to the mid sigmoid colon where there is abrupt termination. This maybe related to a diverticular stricture or possible mass. There is a suggestion of pneumatosis which is nonspecific although could be related to   ischemia. Surgical consultation is suggested.

## 2023-07-12 NOTE — PROGRESS NOTES
CLINICAL NUTRITION SERVICES - REASSESSMENT NOTE     Nutrition Prescription    RECOMMENDATIONS FOR MDs/PROVIDERS TO ORDER:  Recommend consideration of alternate route of nutrition support if unable to use gut within 2-3 days    Malnutrition Status:    % Intake: Decreased intake does not meet criteria  % Weight Loss: Unable to assess d/t fluid retention  Subcutaneous Fat Loss: None observed  Muscle Loss: Temporal:  Mild to moderate, Thoracic region (clavicle, acromium bone, deltoid, trapezius, pectoral):  moderate, Upper leg (quadricep, hamstring):  moderate and Patellar region:  Moderate--pt on neuromuscular blockade earlier in admission as well as steroid--difficult to determine what is nutrition related vs other, likely a combination  Fluid Accumulation/Edema: Does not meet criteria  Malnutrition Diagnosis: Patient does not meet two of the established criteria necessary for diagnosing malnutrition but is at risk for malnutrition       EVALUATION OF THE PROGRESS TOWARD GOALS   Diet: NPO    Nutrition Support:      Access: NJ tube (6/28)    Formula/Rate/Provisions:  Promote @ 65 ml/hr x 22 hrs (1430 ml) provides 1430 kcal, 89 g protein, 186 g CHO, 0 g fiber, 1200 ml free H2O    Flushes: H2O- 30 ml q 4 hrs    Modulars: Prosource TF 20 x 1 pkt/day= 80 kcal/20 g protein    Total energy/protein provisions: 1510 kcal (23 kcal/kg)/ 109 g protein (1.7 g/kg) per DW 64.5 kg      Intake/Tolerance:  NPO x 3 days in a row as of today, NPO for portion of day on 7/7 d/t trach placement, meeting <75% needs on average over past week     NEW FINDINGS   General: Pt discussed during IDT rounds this morning. Was taken to OR this morning for diverting colostomy in the setting of bowel obstruction. No intake x 3 days.    Weight: trace edema noted, per I/Os is net negative 6 L, but suspect this is in accurate  Date/Time Weight Weight Method   07/11/23 0500 70.6 kg (155 lb 10.3 oz) Bed scale   07/10/23 0600 69.6 kg (153 lb 7 oz) Bed scale    07/09/23 0552 69.9 kg (154 lb 1.6 oz) Bed scale   07/08/23 0445 68.6 kg (151 lb 3.8 oz) Bed scale   07/07/23 0600 67 kg (147 lb 11.3 oz) Bed scale   07/06/23 0300 68.1 kg (150 lb 2.1 oz) Bed scale   07/05/23 0615 68.3 kg (150 lb 9.2 oz) Bed scale   07/04/23 0630 72.3 kg (159 lb 6.3 oz) Bed scale   07/03/23 0430 76.1 kg (167 lb 12.3 oz) Bed scale   07/02/23 0529 80.6 kg (177 lb 11.1 oz) Bed scale   07/01/23 0448 79.1 kg (174 lb 6.1 oz) Bed scale   06/30/23 0600 79.1 kg (174 lb 6.1 oz) Bed scale   06/29/23 0430 78.2 kg (172 lb 6.4 oz) Bed scale   06/28/23 0556 75.5 kg (166 lb 7.2 oz) Bed scale   06/27/23 0600 75.3 kg (166 lb 0.1 oz) Bed scale   06/26/23 0615 76.4 kg (168 lb 6.9 oz) Bed scale   06/25/23 0530 77 kg (169 lb 12.1 oz) Bed scale   06/24/23 0600 73.7 kg (162 lb 7.7 oz) Bed scale   06/23/23 0345 67.7 kg (149 lb 4 oz) Bed scale   06/21/23 0304 64.5 kg (142 lb 3.2 oz) Bed scale     Labs:   - K 3.1  - Cr 0.46    GI: large bowel obstruction s/p diverting colostomy this morning    Skin: no concerns, new surgical incision    Meds:   - Lipitor  - levothyroxine  - MgOx  - Mvit w/min  - oxycodone  - pantoprazole  - kcl  - senna docusate  - simethicone    ASSESSED NUTRITION NEEDS:  Dose weight: 64.5 kg  Estimated Energy Needs: 7618-6544 kcals/day (20-25 kcal/kg)  Justification: Vented  Estimated Protein Needs:  grams protein/day (1.2 - 2 grams of pro/kg)  Justification: Hypercatabolism with critical illness  Estimated Fluid Needs: 1925 mL/day (30 mL/kg)   Justification: Maintenance    MALNUTRITION  As noted above    Previous Goals   Total avg nutritional intake to meet a minimum of 20 kcal/kg and 1.2 g PRO/kg daily (per dosing wt 64.5 kg).  Evaluation: Not met    Previous Nutrition Diagnosis  Excessive energy intake related to high total feedings as evidenced by EN + propofol + modular providing 1744 kcal (108% of energy needs)  Evaluation: Resolved    CURRENT NUTRITION DIAGNOSIS  Inadequate protein-energy  intake related to TF on hold d/t bowel obstruction now s/p diverting colostomy as evidenced by no EN infusion x 3 days      INTERVENTIONS  Implementation  Restart NS when based on surgery's recommendations  Collaboration and Referral of Nutrition care: pt discussed during IDT rounds this morning    Goals  Total avg nutritional intake to meet a minimum of 20 kcal/kg and 1.2 g PRO/kg daily (per dosing wt 64.5 kg).    Monitoring/Evaluation  Progress toward goals will be monitored and evaluated per protocol.    Danielle Molina, RASHAD, LD, [facility]  Pager - 3rd floor/ICU: [phone]  Pager - All other floors: [phone]  Pager - Weekend/holiday: [phone]  Office: [phone]

## 2023-07-12 NOTE — PROGRESS NOTES
NG in place on left nare at 40 cm on patient was left in place by ICU staff as they were unable to advance. Tube was able to be advanced and then bowl sounds auscultated for conformation.  Stat abdomen x ray done and read by Dr Prado who states that tube should be removed as it was in patients lung.  The above was communicated to ICU staff.  Will re attempt in radiology in AM.

## 2023-07-12 NOTE — PROGRESS NOTES
[facility]    ICU Progress Note       Date of Admission:  6/20/2023    Assessment: Critical Care   Lara Melendez is a 57 year old female admitted on 6/20/2023. She has a history of COPD, emphysema, O2 dependent at home, tobacco dependence, hypertension, anxiety, atrial fibrillation on Eliquis, hypothyroidism presented with worsening shortness of breath and wheezing after having been prescribed azithromycin and prednisone prior to admission. COPD Exacerbation vs Status Asthmaticus in the ED,given IV steroids, IV magnesium nebulizers and BiPAP.  Admitted to the ICU, failed BiPAP and was intubated.  She had significant bronchospasm with high airway pressures requiring deep sedation with fentanyl, ketamine and propofol and finally a paralytic.  Has had several episodes of Afib with RVR (known history) for which she was cardioverted on 7/8 and again on 7/9.  Has been weaned off of paralytic, but failed extubation and was trached at bedside on 7/7 (sutures to be removed 7/12) and has since been slowly improving, however BAL on 7/7 was positive for Rhinovirus so patient remains on droplet precautions.   Developed an ileus 7/10 that progressed to colonic dilation with pneumatosis and went to OR for colostomy on 7/12 AM       Plan: Critical Care      Lara Melendez IS a 57 year old female admitted on 6/20/2023 for, acute respiratory failure secondary to COPD exacerbation, small left-sided pneumothorax (resolved) and history of atrial fibrillation on Eliquis. Ileus noted 7/10, went to OR 7/12 for colostomy due to colonic dilation and pnematosis    I have personally reviewed the daily labs, imaging studies, cultures and discussed the case with referring physician and consulting physicians.      My assessment and plan by system for this patient is as follows:     Neurology/Psychiatry:   1. Off Sedation  2  Pain Mgt as needed.  Will try and reduce opioid in setting of ileus  3. ICU Delirium  (improved)  4. Neuromuscular weakness of critical illness (improving)     Plan    Off Precedex  Scheduled Acetaminophen .  Scheduled Oxy 5 mg q4  PT/OT: Up to chair  Tatyana's boot to bilateral feet      Cardiovascular:   1.Hemodynamics -normotensive to hypertensive  2.Rhythm currently sinus with PACs  3. A-fib with episodes of RVR: required cardioversion but now NSR     Plan  Rate controlled  Appreciate cardiology input  Continue p.o. amiodarone.   Continue p.o. cardizem  Mg >2.2  K+ >4  On Apixiban for anticoagulation, restarted 7/9 evening.        Pulmonary/Ventilator Management:   1. Severe obstructive lung disease that developed somewhat precipitously in the last year, she quit smoking. Was being treated for exacerbation prior to admission but worsened, came to hospital, ultimately intubated and continued to have severe obstruction/airflow resistance.    - she completed 14 days of prednisone while hospitalized   - may benefit from more aggressive evaluation and management of obstructive lung  disease (eg allergy testing, biologics)  2. Trached, tolerating CPAP/PS much of the day but with thick secretions   Oxygenation/ventilation adequate     Plan     Vent Mode: CMV/AC  (Continuous Mandatory Ventilation/ Assist Control)  FiO2 (%): 30 %  Resp Rate (Set): 18 breaths/min  Tidal Volume (Set, mL): 350 mL  PEEP (cm H2O): 6 cmH2O  Pressure Support (cm H2O): 10 cmH2O  Resp: 17     S/p Perc Trach 7/7/2023 (Suture removal 7/12)  Trach cares  Speech valve today  PS as tolerated        GI and Nutrition :   1.  Ileus evaluated with CT and determined to be colonic dilation with pneumatosis, s/p colostomy on 7/12 .       2.  Patient does not meet criteria for malnutrition at this time     Plan  - POD 0, NPO  - management per colorectal surgery        Renal/Fluids/Electrolytes:   1.  No acute renal injury at this time  2.  Hypernatremia: resolved  3.  Last gas shows an appropriately compensated respiratory acidosis and patient  continues to look alert and be responsive.  Will recheck if indicated.  4.  Appears hypervolemic      Intake/Output Summary (Last 24 hours) at 7/12/2023 0840  Last data filed at 7/12/2023 0715  Gross per 24 hour   Intake 240 ml   Output 2495 ml   Net -2255 ml       07/07 0700 - 07/12 0659  In: 7614.35 [I.V.:1109.35]  Out: 7635 [Urine:7590; Drains:40]  Net: -20.65       Plan  - monitor function and electrolytes as needed with replacement per ICU protocols. - generally avoid nephrotoxic agents such as NSAID, IV contrast unless specifically required  - adjust medications as needed for renal clearance          Infectious Disease:   1.  Sepsis: Resolved              UTI: Pseudomonas aeruginosa              lower respiratory tract: Klebsiella oxytoca (6/26), Rhinovirus (7/7)  2. Rhinovirus              - Droplet precautions              - supportive cares     Plan  -Completed 10 day course of appropriate Abx (cefepime/meropenem)        Endocrine:   1.  Concern for stress-induced hyperglycemia  2.  Concern for diabetes induced hyperglycemia     Plan  - ICU insulin protocol, goal sugar <180, sugars have been well controlled.        Hematology/Oncology:   1.  Leukocytosis: resolved  2.  Anemia: multifactorial  3.  Medication induced coagulopathy: Eliquis for atrial fibrillation held since colostomy 7/12     Plan  - Transfuse for Hgb <7          IV/Access:   1. Venous access -central access  2. Arterial access - n/a        Plan  - central access required and necessary        ICU Prophylaxis:   1. DVT: Eliquis; venodynes  2. VAP: HOB 30 degrees, chlorhexidine rinse  3. Stress Ulcer: PPI/H2 blocker  4. Restraints: None   5. Wound care: local wound care as needed  6. Feeding: continue tube feeds  7. Family Update:  updated via phone  8. Disposition:  ICU; Medically stable for LTAC     Lines/ tubes/ drains:  Hines Catheter: PRESENT, indication:  Neurogenic Bladder  Lines: PRESENT      PICC 06/22/23 Triple Lumen Right  "Brachial vein medial access/medications as ordered-Site Assessment: WDL        Prophylaxis:  - DVT Prophylaxis: LMWH and Pneumatic Compression Devices  - PUD Prophylaxis: PPI    Code Status: Full Code      Disposition:  - ICU,     The patient's care was discussed with the Bedside Nurse, Patient, and Primary team.  Critical care time exclusive of procedures: 35    Milla Veliz MD  [facility]  Securely message with [employer] (more info)  Text page via [employer] Paging/Directory     Clinically Significant Risk Factors        # Hypokalemia: Lowest K = 3.1 mmol/L in last 2 days, will replace as needed       # Hypoalbuminemia: Lowest albumin = 2.8 g/dL at 7/12/2023  4:43 AM, will monitor as appropriate       # Hypertension: Noted on problem list        # Overweight: Estimated body mass index is 27.57 kg/m  as calculated from the following:    Height as of this encounter: 1.6 m (5' 3\").    Weight as of this encounter: 70.6 kg (155 lb 10.3 oz).                ______________________________________________________________________    Interval History   OR this morning for colonic dilation with pneumatosis - colostomy performed  Trach sutures removed this morning, she is doing pressure support trial     Physical Exam   Vital Signs: Temp: 98  F (36.7  C) Temp src: Oral BP: (!) 143/74 Pulse: 83   Resp: 17 SpO2: 98 % O2 Device: Mechanical Ventilator (trach)    Weight: 155 lbs 10.32 oz    GEN: no acute distress, responds appropriately, complains of some pain.  HEENT: head ncat, sclera anicteric, OP patent, trachea midline, trach site clean  PULM: Synchronous with vent, diffuse wheezes anteriorly but improving  CV/COR: Regularrate and rhythm S1S2 no gallop,  No rub, no murmur  ABD: colostomy, absent bowel sounds  EXT:  Little edema, warm  NEURO: Awake and alert, follows commands  SKIN: no obvious rash  LINES: clean, dry intact     Data   I reviewed all medications, new labs and imaging results over the last " 24 hours.  Arterial Blood Gases   No lab results found in last 7 days.    Complete Blood Count   Recent Labs   Lab 07/12/23  0443 07/11/23  0524 07/10/23  0359 07/09/23 0443   WBC 6.9 7.3 7.2 7.1   HGB 8.6* 8.2* 7.6* 7.6*    177 151 148*       Basic Metabolic Panel  Recent Labs   Lab 07/12/23  0734 07/12/23  0443 07/12/23  0352 07/12/23  0006 07/11/23  0753 07/11/23  0524 07/10/23  0809 07/10/23  0359 07/09/23  1237 07/09/23 0443   NA  --  138  --   --   --  138  --  139  --  140   POTASSIUM  --  3.1*  --   --   --  4.0  --  4.0  --  3.8   CHLORIDE  --  101  --   --   --  102  --  104  --  105   CO2  --  27  --   --   --  28  --  29  --  29   BUN  --  12.8  --   --   --  15.6  --  19.4  --  21.1   CR  --  0.46*  --   --   --  0.48*  --  0.40*  --  0.41*   GLC 96 94 85 103*   < > 109*   < > 135*   < > 139*    < > = values in this interval not displayed.       Liver Function Tests  Recent Labs   Lab 07/12/23 0443 07/09/23 0443   AST 28 25   ALT 53* 78*   ALKPHOS 82 63   BILITOTAL 0.5 0.3   ALBUMIN 2.8* 2.9*       Pancreatic Enzymes  No lab results found in last 7 days.    Coagulation Profile  No lab results found in last 7 days.    IMAGING:  Recent Results (from the past 24 hour(s))   XR Abdomen Port 1 View    Narrative    XR ABDOMEN PORT 1 VIEW   7/11/2023 4:24 PM     HISTORY: Ileus unable to pass NG at bedside-need NG not feeding tube    COMPARISON: Abdominal x-ray on 7/10/2023.      Impression    IMPRESSION: Single frontal view of the lower chest and upper abdomen  was obtained. Feeding tube distal tip projects over the third portion  of the duodenum. Nasogastric tube tip and sideholes project over the  left main bronchus, recommend repositioning and repeating the x-ray.  Multiple gas distended bowel loops in the visualized upper abdomen.    TENZIN SHEPHERD MD         SYSTEM ID:  [id number]   XR Chest Port 1 View    Narrative    EXAM: XR CHEST PORT 1 VIEW  LOCATION: [facility]OSPITAL  DATE: 7/11/2023    INDICATION: NG inadvertently went into the lungs.  COMPARISON: Chest radiograph 07/10/2023.       Impression    IMPRESSION:    Unchanged tracheostomy tube with tip at the thoracic inlet. Feeding tube descends along the course of the esophagus with tip below the diaphragm and out of the field-of-view. Unchanged right PICC with tip in the lower SVC.    Lung volumes remain low with slight elevation of the left hemidiaphragm and adjacent left basilar atelectasis. Multiple small opacities at the left apex are unchanged and could either represent calcifications or aspirated barium. No new airspace   opacities, pleural effusions, or pneumothorax.    Stable, nonenlarged cardiac silhouette.       CT Abdomen Pelvis w Contrast    Narrative    EXAM: CT ABDOMEN PELVIS W CONTRAST  LOCATION: [facility]  DATE: 7/11/2023    INDICATION: abdominal pain nausea, distension, strong concern for SBO  COMPARISON: None.  TECHNIQUE: CT scan of the abdomen and pelvis was performed following injection of IV contrast. Multiplanar reformats were obtained. Dose reduction techniques were used.  CONTRAST: 78mL Isovue 370    FINDINGS:   LOWER CHEST: Small left effusion with atelectasis. Trace right effusion with atelectasis.    HEPATOBILIARY: Liver is within normal limits. Cholecystectomy.    PANCREAS: Normal.    SPLEEN: Normal.    ADRENAL GLANDS: Normal.    KIDNEYS/BLADDER: No significant mass, stone, or hydronephrosis.    BOWEL: The colon is dilated with the cecum measuring approximately 6.6 m, ascending colon measuring approximately 7.7 cm, transverse colon measuring 7.4 cm and descending colon measuring 5.6 cm. The colon abruptly ends in the mid sigmoid colon where   there is extensive diverticulosis. There is a suggestion of pneumatosis within both the ascending and descending colon.    LYMPH NODES: Normal.    VASCULATURE: Unremarkable.    PELVIC ORGANS: Bladder is decompressed. Uterus within  normal limits. The fluid noted within the pelvis. Mild anasarca involving the soft tissues of the pelvis.    MUSCULOSKELETAL: Degenerative changes of the spine.      Impression    IMPRESSION:   1.  The colon is dilated from the cecum to the mid sigmoid colon where there is abrupt termination. This maybe related to a diverticular stricture or possible mass. There is a suggestion of pneumatosis which is nonspecific although could be related to   ischemia. Surgical consultation is suggested.

## 2023-07-12 NOTE — PROGRESS NOTES
"   07/12/23 1700   Appointment Info   Signing Clinician's Name / Credentials (SLP) Simona Biggs M.A. CCC-SLP   General Information   Onset of Illness/Injury or Date of Surgery 06/20/23   Referring Physician Miracle Charles MD   Patient/Family Therapy Goal Statement (SLP) The patient wants H20.   Pertinent History of Current Problem Per MD summary: \"Lara Melendez is a 57 year old female with a history of COPD on prn home oxygen 2-3 L/nc, tobacco dependence, htn/hlp, PAF chronically on apixaban, anxiety, hypothyroidism, psoriasis admitted on 6/20/2023 with SOB and wheezing.     Patient has had a protracted admission here at [facility].  Her symptoms reportedly started a few days prior to admission and she called her pulmonologist who initiated empiric azithromycin and prednisone.  Symptoms continued to worsen so EMS was called and she was brought to the ER.  Her respiratory status continued to decline and she required intubation the day after admission.  She has been treated for community-acquired pneumonia along with COPD exacerbation with steroids and antibiotics.  While on the ventilator, patient had significant bronchospasm and high airway pressures requiring deep sedation and paralysis.  [facility] was actually consulted for possibility of ECMO but she was not deemed an appropriate candidate.  She was also found to have a large pneumothorax on 6/26 with chest tube placed by IR which has subsequently been removed (pulled on 7/10).  She has also had recurrent A-fib with RVR requiring both cardioversion and diltiazem with amiodarone.  She was continued on treatment with steroids, nebs, antibiotics and aggressive diuresis.  Paralytics were able to be weaned off but she did require tracheostomy for long-term ventilator weaning.     Patient had worsening abdominal distention on 7/11.  She had a CT scan done that was concerning for large bowel obstruction.  Colorectal surgery was consulted and she underwent surgical repair " "in the early a.m. of 7/12.\"     SLP consulted for a passy [place] speaking valve.   General Observations Lethargic post op but easily awoke and was agreeable to working with SLP.   Type of Evaluation   Type of Evaluation Artificial Airway (Speaking Valve)   Tracheostomy Assessment (Speaking Valve)   Cuff, Tracheostomy Tube cuffed, deflated   Date of Tracheostomy 07/07/23   Tube Size, Tracheostomy 8.5   Participation Ability (Speaking Valve) follows simple commands;attempts to communicate, mouthing words;awake/alert   Type, Tracheostomy Tube Shiley  (flex)   Respiratory Status (Speaking Valve)   Oxygen Supply Trach Dome  (30% FiO2, 30 LPM)   Oral/Tracheal Secretions (Speaking Valve)   Oral Secretions (Speaking Valve Assessment) minimal secretions   Tracheal Secretions (Speaking Valve Assessment) minimal secretions   Speaking Valve Trials (Speaking Valve)   Outcome of Trial (Speaking Valve) work of breathing increased;speaking valve removed   Voice Production (Speaking Valve Trial) voicing achieved;strained/effortful quality   Breath Support (Speaking Valve Trial) coordinates speech with breath support;exhales through mouth   Recommendations (Speaking Valve Trials) continue speaking valve trials with SLP present   Cough Production (Speaking Valve Trial) fair   Secretions During Valve Use (Speaking Valve Trial) increased secretions during use of valve   Airflow/Phonation Air flow around trach adequate with finger occlusion   Speaking Valve placed on tracheostomy tube   Cuff Inflated at Onset of Evaluation No   Orders received to deflate cuff for PMSV trial Yes   Oxygen saturation after cuff deflation 98 %   Oxygen saturation with PMSV placement 97 %   Total amount of time with PMSV placement: 10   Respiratory rate after cuff deflation 21 Per Minute   Respiratory Rate with PMSV placement   (ranged from 21-29)   General Therapy Interventions   Planned Therapy Interventions Communication   Communication Speaking valve " instruction   Clinical Impression   Criteria for Skilled Therapeutic Interventions Met (SLP Eval) Yes, treatment indicated   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 The patient was transitioned to trach dome today, was on 30% FiO2, 30 LPM upon arrival. RT deflated cuff of 8.5 Shiley flex trach prior to SLP arrival, pt tolerating well. The patient was lethargic but easily awoke to participate with SLP. SLP completed brief finger occlusion with adequate airflow and voicing. SLP placed the Passy [place] Speaking Valve (PMSV) and the patient was able to achieve functional but harsh voicing at the word and phrase level. After 8 minutes the patient did experience increasing RR, after 10 minutes cough/congestion with WOB. SLP removed PMSV with evident backflow. The patient reported improved comfort of breathing with PMSV removal.     At this time, recommend continued trach dome trials with cuff deflated as tolerated. PMSV should be placed by SLP or RT only until consistently improved tolerance is demonstrated. It is possible the patient experienced worsened fatigue today post op. Suspect large trach size is also a factor impacting airflow and fatigue; and a trach downsize will be beneficial when medically appropriate.     SLP will follow closely for PMSV readiness with eventual transition to swallow intervention when more appropriate.   SLP Total Evaluation Time   Eval: use and/or fitting of voice prosthetic device to supp speech (not aug. comm) Minutes (77657) 15   SLP Goals   Therapy Frequency (SLP Eval) daily   SLP Predicted Duration/Target Date for Goal Attainment 07/25/23   SLP Goals  Speaking Valve   SLP: Tolerate valve placement without change in vital signs 30 minutes or longer   SLP: Demonstrate clear voicing at 3 foot distance from listener for sentence-length speech;with 90% intelligibility   Interventions   Interventions Quick Adds Speech, Language, Voice & Communication   Speech, Language, Voice Communication&/or Auditory Processing   Treatment of Speech, Language, Voice Communication&/or Auditory Processing Minutes (91353) 10   Treatment Detail/Skilled Intervention Educated the patient about tracheostomy, anatomy related to speech/trach tube/PMSV, rationale for SLP recs & POC. Pt involved and agreeable to recs as she reported SOB with PMSV, but is motivated to communicate & progress to swallow intervention.   SLP Discharge Planning   SLP Plan PMSV readiness; swallow intervention when appropriate (likely instrumental swallow eval)   SLP Discharge Recommendation [facility]-Motivated patient will benefit from intensive, interdisciplinary therapy.  Anticipate will be able to tolerate 3 hours of therapy per day   SLP Rationale for DC Rec Will benefit from SLP services for well below baseline communication and swallowing s/p tracheostomy and complext hospital course.   SLP Brief overview of current status  At this time, recommend continued trach dome trials with cuff deflated as tolerated. PMSV should be placed by SLP or RT only until consistently improved tolerance is demonstrated.   Total Session Time   Total Session Time (sum of timed and untimed services) 25

## 2023-07-12 NOTE — PROGRESS NOTES
Notified telehub that stat CT abd/pelvis has resulted and lactic was drawn. Spoke with MD Irene on the phone and via camera in pt's room 3 times. MD discussed with pt results of CT and potential for surgery pending discussion with colorectal surgery. Awaiting call back regarding plan.

## 2023-07-12 NOTE — ANESTHESIA POSTPROCEDURE EVALUATION
Patient: Lara Melendez    Procedure: Procedure(s):  OPENING DIVERTING COLOSTOMY       Anesthesia Type:  General    Note:  Disposition: ICU   Postop Pain Control: Uneventful            Sign Out: Well controlled pain   PONV: No   Neuro/Psych: Uneventful            Sign Out: Acceptable/Baseline neuro status   Airway/Respiratory: Uneventful            Sign Out: AIRWAY IN SITU/Resp. Support   CV/Hemodynamics: Uneventful            Sign Out: Acceptable CV status; No obvious hypovolemia; No obvious fluid overload   Other NRE:    DID A NON-ROUTINE EVENT OCCUR? No           Last vitals:  Vitals:    07/12/23 0915 07/12/23 0930 07/12/23 0945   BP: 98/59 127/65 109/60   Pulse: 66 70 66   Resp: 16 13 15   Temp:      SpO2: 99% 100% 100%       Electronically Signed By: Debra Hernandes MD  July 12, 2023  10:17 AM

## 2023-07-12 NOTE — PLAN OF CARE
Goal Outcome Evaluation:      Plan of Care Reviewed With: patient, spouse    ICU End of Shift Summary.  For vital signs and complete assessments, please see documentation flowsheets.      Pertinent assessments: Alert. Answers questions appropriately. Afebrile. Denies pain. Tele shows SR with PACs. BPs stable. Continues on trach vent. Lungs coarse. Thick white inline secretions. NPO. Hines in place with adequate UOP. BS tinkling with abdomen taut and distended.     Major Shift Events: -Abdomen distended and taut despite bowel meds. MDs notified. Colace given awaiting results.                                    -1150 Emesis x1. Held meds. MD notified. Order for neostigmine. Slight effectiveness with moderate BM.                                     -1300 Emesis again and complained of nausea. Order to place NG for LIS> Two ICU nurses attempted and unable. IR called. They attempted at bedisde in which NG placed into left lung. MDs notified and NG removed.                                      -1830 Oder for stat CT of abdomen    Plan (Upcoming Events): Continue to monitor in ICU    Discharge/Transfer Needs: TBD     Bedside Shift Report Completed : yes  Bedside Safety Check Completed: yes

## 2023-07-12 NOTE — PLAN OF CARE
Goal Outcome Evaluation:      Plan of Care Reviewed With: patient, family    Overall Patient Progress: improvingOverall Patient Progress: improving                  ICU End of Shift Summary.  For vital signs and complete assessments, please see documentation flowsheets.      Pertinent assessments: Status POD#0,EXPLORATORY LAPAROTOMY DIVERTING COLOSTOMY  Neuro: A&Ox4. Whispers, mute  Cardiac: tele; Afib/ CVR. BP stable  Resp:trach, tolerated trach done all day, see flow sheets  GI: abdomen distended but softer, faint BS. Colostomy,y WDL, watery stool in bag and gas  : El WDL, adequate UOP  Skin:no changes  Lines: PICC, PIV  Drips:none    Major Shift Events:     Trach stitches removed    Potassium replaced, recheck 4.0    mag replaced, recheck in am    Tolerated PS    Tolerated trach dome    Improve abd distention    Rested most of the day  Plan (Upcoming Events): continue trach dome weaning, surgery, SLP, OT , PT and SW following  Discharge/Transfer Needs: TBD     Bedside Shift Report Completed : yes  Bedside Safety Check Completed: yes   Notified provider about indwelling el catheter discussed removal or continued need.    Did provider choose to remove indwelling el catheter? No      Provider's el indication for keeping indwelling el catheter: Indication for continued use: Neurogenic Bladder    Is there an order for indwelling el catheter? YES    *If there is a plan to keep el catheter in place at discharge daily notification with provider is not necessary, but please add a notation in the treatment team sticky note that the patient will be discharging with the catheter.

## 2023-07-12 NOTE — PROGRESS NOTES
COLON [facility]CT[facility]  PROGRESS NOTE    July 12, 2023  Post-op Day # 0    SUBJECTIVE:  Patient currently comfortable. Abdominal pain is controlled. +flatus from stoma.     OBJECTIVE:  Temp:  [97.1  F (36.2  C)-98.3  F (36.8  C)] 98  F (36.7  C)  Pulse:  [68-90] 83  Resp:  [10-27] 17  BP: (128-168)/() 143/74  FiO2 (%):  [30 %] 30 %  SpO2:  [93 %-100 %] 100 %    Intake/Output Summary (Last 24 hours) at 7/12/2023 0926  Last data filed at 7/12/2023 0715  Gross per 24 hour   Intake 0 ml   Output 2455 ml   Net -2455 ml       GENERAL:  Awake, alert, no acute distress, lying in bed  HEAD: Nomocephalic atraumatic  SCLERA: anicteric  EXTREMITIES: warm and well perfused  ABDOMEN:  Soft, appropriately tender, no rebound or guarding, no peritoneal signs. Stoma viable with gas in the bag. Red rubber bridge under stoma.   INCISION: Midline incision intact    LABS:  Lab Results   Component Value Date    WBC 6.9 07/12/2023    WBC 6.2 02/02/2006     Lab Results   Component Value Date    HGB 8.6 07/12/2023    HGB 10.8 02/02/2006     Lab Results   Component Value Date    HCT 26.5 07/12/2023    HCT 30.4 02/02/2006     Lab Results   Component Value Date     07/12/2023     02/02/2006     Last Basic Metabolic Panel:  Lab Results   Component Value Date     07/12/2023     02/02/2006      Lab Results   Component Value Date    POTASSIUM 3.1 07/12/2023    POTASSIUM 5.1 06/21/2023    POTASSIUM 4.5 05/17/2022    POTASSIUM 3.4 02/02/2006     Lab Results   Component Value Date    CHLORIDE 101 07/12/2023    CHLORIDE 99 06/21/2023    CHLORIDE 103 05/17/2022    CHLORIDE 109 02/02/2006     Lab Results   Component Value Date    EDIN 8.3 07/12/2023    EDIN 7.8 02/02/2006     Lab Results   Component Value Date    CO2 27 07/12/2023    CO2 29 05/17/2022    CO2 17 02/02/2006     Lab Results   Component Value Date    BUN 12.8 07/12/2023    BUN 7 06/21/2023    BUN 17 05/17/2022    BUN <2 02/02/2006     Lab Results   Component  Value Date    CR 0.46 07/12/2023    CR 0.80 02/02/2006     Lab Results   Component Value Date    GLC 96 07/12/2023    GLC 94 07/12/2023     05/17/2022    GLC 90 02/02/2006       ASSESSMENT/PLAN:  Ewa is a 57 year old woman who is POD#0 s/p diverting colostomy for a large bowel obstruction.    - NPO/IVF  - Pain management as needed  - Stoma cares and teaching  - Lovenox for ppx  - Supportive cares per ICU team.       For questions/paging, please contact the CRS office at [phone].    Nicole Kiran PA-C  [facility]  Phone: [phone]

## 2023-07-12 NOTE — ANESTHESIA CARE TRANSFER NOTE
Patient: Lara Melendez    Procedure: Procedure(s):  OPENING DIVERTING COLOSTOMY       Diagnosis: Colostomy present (H) [Z93.3]  Diagnosis Additional Information: No value filed.    Anesthesia Type:   General     Note:    Oropharynx: ventilatory support and spontaneously breathing  Level of Consciousness: drowsy  Oxygen Supplementation: blow-by O2  Level of Supplemental Oxygen (L/min / FiO2): 6  Independent Airway: airway patency not satisfactory and stable  Dentition: dentition unchanged  Vital Signs Stable: post-procedure vital signs reviewed and stable  Report to RN Given: handoff report given  Patient transferred to: ICU    ICU Handoff: Call for PAUSE to initiate/utilize ICU HANDOFF, Identified Patient, Identified Responsible Provider, Reviewed the Pertinent Medical History, Discussed Surgical Course, Reviewed Intra-OP Anesthesia Management and Issues during Anesthesia, Set Expectations for Post Procedure Period and Allowed Opportunity for Questions and Acknowledgement of Understanding      Vitals:  Vitals Value Taken Time   /74 07/12/23 0715   Temp     Pulse 87 07/12/23 0716   Resp 25 07/12/23 0716   SpO2 97 % 07/12/23 0716   Vitals shown include unvalidated device data.    Electronically Signed By: ZACHARY Reyes CRNA  July 12, 2023  7:17 AM

## 2023-07-12 NOTE — PLAN OF CARE
ICU End of Shift Summary. For vital signs and complete assessments, please see documentation flowsheets.     Pertinent assessments: Pt appears to be oriented, nods yes/no appropriately to questions. Trached on the vent at 30%. VSS, afib rate 60's-80's. Continues with distended/taut, painful abdomen. Holding TF and all meds via TF d/t obstruction per MD. Denies nausea, no episodes of vomiting this shift. Hines in place with adequate UOP.  Major Shift Events: called telehub notifying of resulted CT abd/pelvis and lactic at beginning of shift- MD spoke with this RN and pt in room x3- colorectal surgery paged, see note- pt's  and daughter updated by MD via phone and at bedside  Plan (Upcoming Events): pt to OR this morning at 0600  Discharge/Transfer Needs: TBD     Bedside Shift Report Completed: yes  Bedside Safety Check Completed: yes

## 2023-07-12 NOTE — BRIEF OP NOTE
[facility]    Brief Operative Note    Pre-operative diagnosis: Large bowel obstruction  Post-operative diagnosis Same as pre-operative diagnosis    Procedure: Procedure(s):  OPENING DIVERTING COLOSTOMY  Surgeon: Surgeon(s) and Role:     * Jaspal Rashid MD - Primary  Anesthesia: General   Estimated Blood Loss: 10    Drains: None  Specimens: * No specimens in log *  Findings:   None.  Complications: None.  Implants: * No implants in log *    Will continue to hold eliquis.  OK to start prophylactic lovenox.  Keep NPO.    IVF: 500  UOP: 100  Condition on discharge from OR: Satisfactory    Jaspal Rashid MD   [facility]   [phone].        ADDENDUM:    PATIENT DATA  Indicate Y or N:  Home O2 No  Hemodialysis  No  Transplant patient  No  Cirrhosis  No  Steroids in last 30 days  No  Immunomodulators in last 30 days  No  Anticoagulation at time of surgery  yes   List medication eliquis  Prior abdominal surgery  No]  Pelvic irradiation      Albumin within 30 days if known    Hgb within 30 days if known    Hemoglobin   Date Value Ref Range Status   07/12/2023 8.6 (L) 11.7 - 15.7 g/dL Final   02/02/2006 10.8 (L) 11.7 - 15.7 g/dL Final     Comment:     Results confirmed by repeat test   ]  Cr within 30 days if known    Creatinine   Date Value Ref Range Status   07/12/2023 0.46 (L) 0.51 - 0.95 mg/dL Final   02/02/2006 0.80 0.60 - 1.30 mg/dL Final   ]  Body mass index is 27.57 kg/m .      OR DATA  Emergent  Yes   <24 hours  Yes   <1 week  No  Bowel Prep No  Antibiotics  Yes  DVT prophylaxis    Heparin  No   SCD  Yes   None  No  Drain  No  ASA (1,2,3,4) 3  OR time (min) 45 min  Stents  No  Transfuse >/= 2U  No  Anastomosis   Stapled  No   Handsewn  No  Leak Test    Positive  No   Negative  No   Not done  Yes

## 2023-07-12 NOTE — ANESTHESIA PREPROCEDURE EVALUATION
Anesthesia Pre-Procedure Evaluation    Patient: Lara Melendez   MRN: [id number] : 1965        Procedure : Procedure(s):  EXPLORATORY LAPAROTOMY DIVERTING COLOSTOMY          Past Medical History:   Diagnosis Date     Anxiety      COPD (chronic obstructive pulmonary disease) - 2L home O2      Diverticulitis of colon      Hypercholesterolemia      Hypertension      Hypothyroidism      Paroxysmal atrial fibrillation      Psoriasis      Pulmonary nodule - left upper lobe       Past Surgical History:   Procedure Laterality Date     CHOLECYSTECTOMY       INCISION AND DRAINAGE MANDIBLE, COMBINED Left 01/10/2022    Procedure: INCISION AND DRAINAGE, MANDIBLE;  Surgeon: Jn Feliz DDS;  Location: [facility] OR     INCISION AND DRAINAGE MANDIBLE, COMBINED Left 2022    Procedure: INCISION AND DRAINAGE, MANDIBLE;  Surgeon: Taylor Ortez DDS;  Location: [facility] OR     TOOTH EXTRACTION       Vocal Cord surgery        Allergies   Allergen Reactions     Sulfa Antibiotics      Doxycycline Other (See Comments)     Develops chest tightness  Intolerance not allergy     Penicillins Rash     Generalized rash  Rash, Generalized        Social History     Tobacco Use     Smoking status: Never     Smokeless tobacco: Never   Substance Use Topics     Alcohol use: Yes     Comment: occ      Wt Readings from Last 1 Encounters:   23 70.6 kg (155 lb 10.3 oz)        Anesthesia Evaluation            ROS/MED HX  ENT/Pulmonary:     (+) COPD (On Vent), recent URI, unresolved,     Neurologic:    (-) no Parkinson's disease   Cardiovascular: Comment: Study Date: 2023 10:41 AM  Age: 57 yrs  Gender: Female  Patient Location: [facility]  Reason For Study: Atrial Fibrillation  Ordering Physician: TEMITOPE KINSEY  Performed By: RESHMA Muñiz     BSA: 1.7 m2  Height: 64 in  Weight: 143 lb  BP: 107/67 mmHg  ______________________________________________________________________________  Procedure  Complete Portable Echo Adult. Optison  (NDC #[id number]) given intravenously.  Technically difficult study.Extremely poor acoustic windows.  ______________________________________________________________________________  Interpretation Summary     Technically challenging study due to poor acoustic windows.     Hyperdynamic left ventricular function. The visual ejection fraction is >70%.  Endocardial borders are not optimally visualized, however no clear wall motion  abnormalities are seen.  The right ventricle is normal in structure, function and size.  No significant valvular abnormalities.  Dilation of the inferior vena cava is present with abnormal respiratory  variation in diameter.  The rhythm was atrial fibrillation.    (+) Dyslipidemia hypertension-----Taking blood thinners dysrhythmias, a-fib,  (-) CAD, CHF, pacemaker and pacemaker   METS/Exercise Tolerance:     Hematologic:    (-) anemia   Musculoskeletal:       GI/Hepatic:    (-) GERD   Renal/Genitourinary:       Endo:     (+) thyroid problem, hypothyroidism,     Psychiatric/Substance Use:     (+) psychiatric history anxiety     Infectious Disease: Comment: Rhinovirus      Malignancy:   (+) Malignancy, History of GI.GI CA Active status post.        Other:            Physical Exam    Airway             Respiratory Devices and Support    ETT:      Dental           Cardiovascular          Rhythm and rate: regular     Pulmonary           (+) decreased breath sounds       Other findings: Lab Test        07/12/23     07/11/23     07/10/23     06/21/23     06/21/23     03/29/23     03/26/23     12/01/22     10/26/22                       0443          0524          0359          0007          0003          0553          0618          2159          0928          WBC          6.9          7.3          7.2            < >        11.8*          < >        5.8            < >        3.9*          HGB          8.6*         8.2*         7.6*           < >        13.5           < >        11.3*          < >         13.7          MCV          100          104*         103*           < >        102*           < >        106*           < >        103*          PLT          181          177          151            < >        258            < >        231            < >        133*          INR           --           --           --           --          1.04          --          0.97          --          0.91           < > = values in this interval not displayed.                  Lab Test        07/12/23     07/12/23     07/12/23     07/11/23     07/11/23     07/10/23     07/10/23                       0443          0352          0006          0753          0524          0809          0359          NA           138           --           --           --          138           --          139           POTASSIUM    3.1*          --           --           --          4.0           --          4.0           CHLORIDE     101           --           --           --          102           --          104           CO2          27            --           --           --          28            --          29            BUN          12.8          --           --           --          15.6          --          19.4          CR           0.46*         --           --           --          0.48*         --          0.40*         ANIONGAP     10            --           --           --          8             --          6*            EDIN          8.3*          --           --           --          8.6           --          8.3*          GLC          94           85           103*           < >        109*           < >        135*           < > = values in this interval not displayed.                   OUTSIDE LABS:  CBC:   Lab Results   Component Value Date    WBC 6.9 07/12/2023    WBC 7.3 07/11/2023    HGB 8.6 (L) 07/12/2023    HGB 8.2 (L) 07/11/2023    HCT 26.5 (L) 07/12/2023    HCT 26.5 (L) 07/11/2023     07/12/2023      07/11/2023     BMP:   Lab Results   Component Value Date     07/12/2023     07/11/2023    POTASSIUM 3.1 (L) 07/12/2023    POTASSIUM 4.0 07/11/2023    CHLORIDE 101 07/12/2023    CHLORIDE 102 07/11/2023    CO2 27 07/12/2023    CO2 28 07/11/2023    BUN 12.8 07/12/2023    BUN 15.6 07/11/2023    CR 0.46 (L) 07/12/2023    CR 0.48 (L) 07/11/2023    GLC 94 07/12/2023    GLC 85 07/12/2023     COAGS:   Lab Results   Component Value Date    PTT 25 03/26/2023    INR 1.04 06/21/2023     POC:   Lab Results   Component Value Date     (H) 01/30/2006    HCG Negative 01/29/2006     HEPATIC:   Lab Results   Component Value Date    ALBUMIN 2.8 (L) 07/12/2023    PROTTOTAL 5.6 (L) 07/12/2023    ALT 53 (H) 07/12/2023    AST 28 07/12/2023    ALKPHOS 82 07/12/2023    BILITOTAL 0.5 07/12/2023     OTHER:   Lab Results   Component Value Date    PH 7.55 (H) 07/04/2023    LACT 0.3 (L) 07/11/2023    A1C 4.9 03/26/2023    EDIN 8.3 (L) 07/12/2023    PHOS 3.0 07/12/2023    MAG 1.9 07/12/2023    TSH 0.31 07/02/2023    CRP <2.9 05/17/2022       Anesthesia Plan    ASA Status:  4      Anesthesia Type: General.     - Airway: ETT   Induction: Propofol.   Maintenance: Balanced.        Consents    Anesthesia Plan(s) and associated risks, benefits, and realistic alternatives discussed. Questions answered and patient/representative(s) expressed understanding.    - Discussed:     - Discussed with:  Implied consent/emergency      - Extended Intubation/Ventilatory Support Discussed: Yes.         Postoperative Care    Pain management: IV analgesics, Oral pain medications.        Comments:                Kody Marie MD

## 2023-07-12 NOTE — PROGRESS NOTES
CRS Staff    Called re: large bowel obstruction.  57 year old female with history of admission to hospital for 3 weeks with respiratory failure.  Has h/o COPD.  Now on the vent an trached.  Has developed abdominal distention.  CT shows large bowel obstruction from mass vs diverticular stricture.  Discussed with ICU team.  Patient is non toxic.  I reviewed the images.  Patient's  advised she needs a diverting colostomy and that this is likely permanent.  ACS risk calculator shows a 25% mortality and 50% morbidity.   insists on proceeding.  OR currently backed up with 2 emergent cases.  Will plan to do in early AM.    Jaspal Rashid MD FACS FASCRS  Colorectal Surgeon       [facility]  [address], [address]  [place] [address]  T: [phone]  F: [phone]  Pager: [phone]  [email or web address]

## 2023-07-12 NOTE — PROGRESS NOTES
Respiratory Therapy Note    Patient seen on mechanical ventilator this AM. Patient was weaned to HFTD at 1135, current settings:    Flow: 30 LPM  FiO2: 25%    Patient's skin is intact. Patient's tracheostomy cuff is deflated, tolerating well. Patient used PMV today with speech therapy, patient tolerated PMV for a short period of time. Patient's tracheostomy site cleaned, inner cannula changed, and trach ties changed this shift. Pt will continue to receive Pulmicort BID, Atrovent QID, and Xopenex QID. RT will continue to monitor.      Stacey Monsivais, RT  5:48 PM July 12, 2023

## 2023-07-13 ENCOUNTER — APPOINTMENT (OUTPATIENT)
Dept: PHYSICAL THERAPY | Facility: CLINIC | Age: 58
End: 2023-07-13
Payer: COMMERCIAL

## 2023-07-13 ENCOUNTER — APPOINTMENT (OUTPATIENT)
Dept: OCCUPATIONAL THERAPY | Facility: CLINIC | Age: 58
End: 2023-07-13
Payer: COMMERCIAL

## 2023-07-13 ENCOUNTER — APPOINTMENT (OUTPATIENT)
Dept: SPEECH THERAPY | Facility: CLINIC | Age: 58
End: 2023-07-13
Payer: COMMERCIAL

## 2023-07-13 LAB
ANION GAP SERPL CALCULATED.3IONS-SCNC: 8 MMOL/L (ref 7–15)
BUN SERPL-MCNC: 9.9 MG/DL (ref 8–23)
CALCIUM SERPL-MCNC: 7.7 MG/DL (ref 8.6–10)
CHLORIDE SERPL-SCNC: 102 MMOL/L (ref 98–107)
CREAT SERPL-MCNC: 0.46 MG/DL (ref 0.51–0.95)
DEPRECATED HCO3 PLAS-SCNC: 27 MMOL/L (ref 22–29)
ERYTHROCYTE [DISTWIDTH] IN BLOOD BY AUTOMATED COUNT: 16.9 % (ref 10–15)
GFR SERPL CREATININE-BSD FRML MDRD: >90 ML/MIN/1.73M2
GLUCOSE BLDC GLUCOMTR-MCNC: 125 MG/DL (ref 70–99)
GLUCOSE BLDC GLUCOMTR-MCNC: 125 MG/DL (ref 70–99)
GLUCOSE BLDC GLUCOMTR-MCNC: 133 MG/DL (ref 70–99)
GLUCOSE BLDC GLUCOMTR-MCNC: 134 MG/DL (ref 70–99)
GLUCOSE BLDC GLUCOMTR-MCNC: 141 MG/DL (ref 70–99)
GLUCOSE BLDC GLUCOMTR-MCNC: 148 MG/DL (ref 70–99)
GLUCOSE BLDC GLUCOMTR-MCNC: 176 MG/DL (ref 70–99)
GLUCOSE BLDC GLUCOMTR-MCNC: 68 MG/DL (ref 70–99)
GLUCOSE SERPL-MCNC: 158 MG/DL (ref 70–99)
HCT VFR BLD AUTO: 25.3 % (ref 35–47)
HGB BLD-MCNC: 7.9 G/DL (ref 11.7–15.7)
MAGNESIUM SERPL-MCNC: 1.8 MG/DL (ref 1.7–2.3)
MCH RBC QN AUTO: 31.5 PG (ref 26.5–33)
MCHC RBC AUTO-ENTMCNC: 31.2 G/DL (ref 31.5–36.5)
MCV RBC AUTO: 101 FL (ref 78–100)
PHOSPHATE SERPL-MCNC: 2.4 MG/DL (ref 2.5–4.5)
PLATELET # BLD AUTO: 181 10E3/UL (ref 150–450)
POTASSIUM SERPL-SCNC: 3.1 MMOL/L (ref 3.4–5.3)
POTASSIUM SERPL-SCNC: 3.6 MMOL/L (ref 3.4–5.3)
RBC # BLD AUTO: 2.51 10E6/UL (ref 3.8–5.2)
SODIUM SERPL-SCNC: 137 MMOL/L (ref 136–145)
WBC # BLD AUTO: 6.1 10E3/UL (ref 4–11)

## 2023-07-13 PROCEDURE — 84100 ASSAY OF PHOSPHORUS: CPT | Performed by: HOSPITALIST

## 2023-07-13 PROCEDURE — 97110 THERAPEUTIC EXERCISES: CPT | Mod: GP | Performed by: PHYSICAL THERAPIST

## 2023-07-13 PROCEDURE — 999N000157 HC STATISTIC RCP TIME EA 10 MIN

## 2023-07-13 PROCEDURE — 83735 ASSAY OF MAGNESIUM: CPT | Performed by: INTERNAL MEDICINE

## 2023-07-13 PROCEDURE — C9113 INJ PANTOPRAZOLE SODIUM, VIA: HCPCS | Mod: JZ | Performed by: HOSPITALIST

## 2023-07-13 PROCEDURE — 84132 ASSAY OF SERUM POTASSIUM: CPT | Performed by: HOSPITALIST

## 2023-07-13 PROCEDURE — 250N000009 HC RX 250: Performed by: INTERNAL MEDICINE

## 2023-07-13 PROCEDURE — 250N000013 HC RX MED GY IP 250 OP 250 PS 637: Performed by: HOSPITALIST

## 2023-07-13 PROCEDURE — 258N000001 HC RX 258: Performed by: SURGERY

## 2023-07-13 PROCEDURE — 97530 THERAPEUTIC ACTIVITIES: CPT | Mod: GP | Performed by: PHYSICAL THERAPIST

## 2023-07-13 PROCEDURE — 92507 TX SP LANG VOICE COMM INDIV: CPT | Mod: GN

## 2023-07-13 PROCEDURE — 85027 COMPLETE CBC AUTOMATED: CPT | Performed by: INTERNAL MEDICINE

## 2023-07-13 PROCEDURE — 200N000001 HC R&B ICU

## 2023-07-13 PROCEDURE — 250N000011 HC RX IP 250 OP 636: Mod: JZ | Performed by: INTERNAL MEDICINE

## 2023-07-13 PROCEDURE — 250N000011 HC RX IP 250 OP 636: Mod: JZ | Performed by: HOSPITALIST

## 2023-07-13 PROCEDURE — 250N000013 HC RX MED GY IP 250 OP 250 PS 637: Performed by: INTERNAL MEDICINE

## 2023-07-13 PROCEDURE — 250N000009 HC RX 250: Performed by: HOSPITALIST

## 2023-07-13 PROCEDURE — 999N000215 HC STATISTIC HFNC ADULT NON-CPAP

## 2023-07-13 PROCEDURE — 94640 AIRWAY INHALATION TREATMENT: CPT | Mod: 76

## 2023-07-13 PROCEDURE — G0463 HOSPITAL OUTPT CLINIC VISIT: HCPCS

## 2023-07-13 PROCEDURE — 99233 SBSQ HOSP IP/OBS HIGH 50: CPT | Performed by: HOSPITALIST

## 2023-07-13 PROCEDURE — 97110 THERAPEUTIC EXERCISES: CPT | Mod: GO

## 2023-07-13 PROCEDURE — 258N000001 HC RX 258: Performed by: HOSPITALIST

## 2023-07-13 PROCEDURE — 250N000013 HC RX MED GY IP 250 OP 250 PS 637: Performed by: SURGERY

## 2023-07-13 PROCEDURE — 80048 BASIC METABOLIC PNL TOTAL CA: CPT | Performed by: INTERNAL MEDICINE

## 2023-07-13 PROCEDURE — 94660 CPAP INITIATION&MGMT: CPT

## 2023-07-13 RX ORDER — POTASSIUM CHLORIDE 20MEQ/15ML
40 LIQUID (ML) ORAL ONCE
Status: COMPLETED | OUTPATIENT
Start: 2023-07-13 | End: 2023-07-13

## 2023-07-13 RX ORDER — DEXTROSE MONOHYDRATE 100 MG/ML
INJECTION, SOLUTION INTRAVENOUS CONTINUOUS PRN
Status: DISCONTINUED | OUTPATIENT
Start: 2023-07-13 | End: 2023-07-22

## 2023-07-13 RX ORDER — MAGNESIUM OXIDE 400 MG/1
400 TABLET ORAL EVERY 4 HOURS
Status: COMPLETED | OUTPATIENT
Start: 2023-07-13 | End: 2023-07-13

## 2023-07-13 RX ADMIN — DILTIAZEM HYDROCHLORIDE 5 MG: 5 INJECTION, SOLUTION INTRAVENOUS at 20:41

## 2023-07-13 RX ADMIN — DEXTROSE MONOHYDRATE 50 ML: 25 INJECTION, SOLUTION INTRAVENOUS at 01:00

## 2023-07-13 RX ADMIN — PANTOPRAZOLE SODIUM 40 MG: 40 INJECTION, POWDER, FOR SOLUTION INTRAVENOUS at 09:04

## 2023-07-13 RX ADMIN — BUDESONIDE 0.5 MG: 0.5 INHALANT ORAL at 07:41

## 2023-07-13 RX ADMIN — ENOXAPARIN SODIUM 40 MG: 40 INJECTION SUBCUTANEOUS at 19:29

## 2023-07-13 RX ADMIN — IPRATROPIUM BROMIDE 0.5 MG: 0.5 SOLUTION RESPIRATORY (INHALATION) at 07:41

## 2023-07-13 RX ADMIN — LEVALBUTEROL HYDROCHLORIDE 0.63 MG: 0.63 SOLUTION RESPIRATORY (INHALATION) at 19:43

## 2023-07-13 RX ADMIN — IPRATROPIUM BROMIDE 0.5 MG: 0.5 SOLUTION RESPIRATORY (INHALATION) at 15:59

## 2023-07-13 RX ADMIN — BUDESONIDE 0.5 MG: 0.5 INHALANT ORAL at 19:45

## 2023-07-13 RX ADMIN — POTASSIUM & SODIUM PHOSPHATES POWDER PACK 280-160-250 MG 1 PACKET: 280-160-250 PACK at 16:34

## 2023-07-13 RX ADMIN — DILTIAZEM HYDROCHLORIDE 5 MG: 5 INJECTION, SOLUTION INTRAVENOUS at 09:04

## 2023-07-13 RX ADMIN — DEXTROSE MONOHYDRATE 1000 ML: 100 INJECTION, SOLUTION INTRAVENOUS at 00:15

## 2023-07-13 RX ADMIN — MAGNESIUM OXIDE TAB 400 MG (241.3 MG ELEMENTAL MG) 400 MG: 400 (241.3 MG) TAB at 09:11

## 2023-07-13 RX ADMIN — POTASSIUM CHLORIDE 40 MEQ: 20 SOLUTION ORAL at 09:05

## 2023-07-13 RX ADMIN — LEVALBUTEROL HYDROCHLORIDE 0.63 MG: 0.63 SOLUTION RESPIRATORY (INHALATION) at 07:41

## 2023-07-13 RX ADMIN — DILTIAZEM HYDROCHLORIDE 5 MG: 5 INJECTION, SOLUTION INTRAVENOUS at 01:10

## 2023-07-13 RX ADMIN — LEVOTHYROXINE SODIUM 88 MCG: 20 INJECTION, SOLUTION INTRAVENOUS at 09:11

## 2023-07-13 RX ADMIN — POTASSIUM & SODIUM PHOSPHATES POWDER PACK 280-160-250 MG 1 PACKET: 280-160-250 PACK at 12:31

## 2023-07-13 RX ADMIN — DILTIAZEM HYDROCHLORIDE 5 MG: 5 INJECTION, SOLUTION INTRAVENOUS at 16:34

## 2023-07-13 RX ADMIN — MAGNESIUM OXIDE TAB 400 MG (241.3 MG ELEMENTAL MG) 400 MG: 400 (241.3 MG) TAB at 09:04

## 2023-07-13 RX ADMIN — DILTIAZEM HYDROCHLORIDE 5 MG: 5 INJECTION, SOLUTION INTRAVENOUS at 12:24

## 2023-07-13 RX ADMIN — IPRATROPIUM BROMIDE 0.5 MG: 0.5 SOLUTION RESPIRATORY (INHALATION) at 19:45

## 2023-07-13 RX ADMIN — DILTIAZEM HYDROCHLORIDE 5 MG: 5 INJECTION, SOLUTION INTRAVENOUS at 04:37

## 2023-07-13 RX ADMIN — AMIODARONE HYDROCHLORIDE 200 MG: 200 TABLET ORAL at 09:04

## 2023-07-13 RX ADMIN — NICOTINE 1 PATCH: 14 PATCH, EXTENDED RELEASE TRANSDERMAL at 09:06

## 2023-07-13 RX ADMIN — POTASSIUM & SODIUM PHOSPHATES POWDER PACK 280-160-250 MG 1 PACKET: 280-160-250 PACK at 09:52

## 2023-07-13 RX ADMIN — MAGNESIUM OXIDE TAB 400 MG (241.3 MG ELEMENTAL MG) 400 MG: 400 (241.3 MG) TAB at 12:24

## 2023-07-13 RX ADMIN — LEVALBUTEROL HYDROCHLORIDE 0.63 MG: 0.63 SOLUTION RESPIRATORY (INHALATION) at 15:59

## 2023-07-13 ASSESSMENT — ACTIVITIES OF DAILY LIVING (ADL)
ADLS_ACUITY_SCORE: 40
ADLS_ACUITY_SCORE: 36
ADLS_ACUITY_SCORE: 36
ADLS_ACUITY_SCORE: 40
ADLS_ACUITY_SCORE: 40
ADLS_ACUITY_SCORE: 36
ADLS_ACUITY_SCORE: 40
ADLS_ACUITY_SCORE: 40
ADLS_ACUITY_SCORE: 36
ADLS_ACUITY_SCORE: 40
ADLS_ACUITY_SCORE: 36
ADLS_ACUITY_SCORE: 40

## 2023-07-13 NOTE — PLAN OF CARE
Goal Outcome Evaluation:      Plan of Care Reviewed With: patient              ICU End of Shift Summary.  For vital signs and complete assessments, please see documentation flowsheets.      Pertinent assessments:   Neuro:A+O,able to make needs known,  Cardiac:SR  Resp:Trach dome all night,25%02 30L,suctioned for thick tan secretions  GI:Colostomy with small amt liquid stool  :Hines draining adequate amt leelee urine  Skin:No issues  Lines:TL PICC  Drips:D10    Major Shift Events: D10 started for low BS TF on hold    Plan (Upcoming Events): Increase activity as able with PT/OT  Discharge/Transfer Needs: TBD     Bedside Shift Report Completed : yes  Bedside Safety Check Completed: yes

## 2023-07-13 NOTE — PROGRESS NOTES
Care Management Follow Up    Length of Stay (days): 22    Expected Discharge Date: 07/13/2023     Concerns to be Addressed:       Patient plan of care discussed at interdisciplinary rounds: Yes    Anticipated Discharge Disposition: LTACH vs ARU     Anticipated Discharge Services:    Anticipated Discharge DME:      Patient/family educated on Medicare website which has current facility and service quality ratings: yes  Education Provided on the Discharge Plan:    Patient/Family in Agreement with the Plan:      Referrals Placed by CM/SW: Post Acute Facilities  Private pay costs discussed: Not applicable    Additional Information:  Received call from [place] liaison regarding LTACH review. Patient initially qualified for LTACH but since acceptance has required surgery for a diverting colostomy for a large bowel obstruction. Patient has also weaned off the vent and is on  HFTD 30LPM, 25%.  LTACH liaison is reviewing their criteria and will continue to follow to see if she may continue to meet their criteria. ARU liaison is also aware of patient and they will continue to follow along as well.    Not ready for discharge today. Remains NPO per surgery and may restart tube feeding tomorrow.    Will continue to follow for discharge planning    Bhakti Walters RN BSN OCN  Care Coordinator  [facility]  [phone]

## 2023-07-13 NOTE — OP NOTE
OPERATIVE REPORT    PREOPERATIVE DIAGNOSIS: Large bowel obstruction     POSTOPERATIVE DIAGNOSIS:  same     PROCEDURE:     1.  Open diverting loop transverse colostomy          SURGEON:  Jaspal Rashid MD          ANESTHESIA:  General.      ESTIMATED BLOOD LOSS:  25 mL.      SPECIMENS: None     INDICATIONS: This is a 57-year-old female with a history of significant respiratory issues.  She has been hospitalized for several weeks with respiratory failure.  Fortunately she has been slowly recovering from this.  This has required a tracheostomy.  She remains off ventilator.  She has been recently having issues over the past couple days with abdominal distention.  X-rays demonstrated dilated loops of bowel.  She was treated with laxatives and neostigmine with only partial effect.  Given her ongoing distention a CT scan was obtained late last evening.  This demonstrated a significantly dilated colon without dilated small bowel.  The colon tapered down to an area of stricturing in the sigmoid colon.  I reviewed the images.  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    I discussed the situation with her  Luis.  I recommended a diverting loop colostomy to relieve the obstruction.  I advised him that we would not be primarily removing the stricture at this time.  The risks of surgery including bleeding, infection, cardiac complications and expectations regarding recovery were discussed.  He wishes to proceed and he provided verbal consent.    FINDINGS: The transverse colon was quite dilated but healthy appearing there is no evidence of any succus within the abdomen     DESCRIPTION OF PROCEDURE: After obtaining form consent the patient brought to the operating room.  She was laid supine on the operative table general anesthesia was induced to the patient's tracheostomy.  She was kept in the supine position and her arms were out on arm boards.  The abdomen was prepped and draped in usual sterile fashion.   A timeout was undertaken which identified the patient correct procedure.  She received appropriate preoperative antibiotics.    We entered into the abdomen through an upper midline incision.  Dissection was taken down with cautery.  She has a very thin abdominal wall and minimal abdominal wall musculature.  On entering the abdomen we were promptly met with the transverse colon and some the omentum.  We liberated the omentum from the transverse colon and its mesentery with electrocautery.  Great care was taken to avoid injury to the bowel as well as to maintain hemostasis.  This allowed us to deliver a loop of the transverse colon above the level of the fascia.  The colon was isolated with electrocautery and a small red rubber catheter was passed underneath the bowel through the mesentery.    I then partially reapproximated the fascia here to bring the fascial level closer to the colon for an appropriate sized colostomy trephine.  This was done with a running 2-0 Vicryl suture on a UR 6.  The skin was reapproximated as well in a similar fashion with a running 4-0 Monocryl.  This was placed in a subcuticular fashion and Exofin was placed as a dressing on the skin.    The red rubber catheter was cut to appropriate length and utilized as a stoma bridge.  It was secured to the skin with 2-0 nylon sutures.  A colotomy was made.  There was a prompt rush of gas and some stool from the colon.  Her belly immediately got much softer and less distended.  The colostomy was matured in a loop Brooked fashion with interrupted 3-0 Vicryl sutures.  A stoma appliance was cut and applied.  She was awakened and transferred to the intensive care unit in stable condition.  Counts were correct at the end of the case.         MOOSE SCHREIBER MD

## 2023-07-13 NOTE — PROGRESS NOTES
[facility]    Hospitalist Progress Note      Assessment & Plan   Lara Melendez is a 57 year old female with a history of COPD on prn home oxygen 2-3 L/nc, tobacco dependence, htn/hlp, PAF chronically on apixaban, anxiety, hypothyroidism, psoriasis admitted on 6/20/2023 with SOB and wheezing.     Patient has had a protracted admission here at [facility].  Her symptoms reportedly started a few days prior to admission and she called her pulmonologist who initiated empiric azithromycin and prednisone.  Symptoms continued to worsen so EMS was called and she was brought to the ER.  Her respiratory status continued to decline and she required intubation the day after admission.  She has been treated for community-acquired pneumonia along with COPD exacerbation with steroids and antibiotics.  While on the ventilator, patient had significant bronchospasm and high airway pressures requiring deep sedation and paralysis.  [facility] was actually consulted for possibility of ECMO but she was not deemed an appropriate candidate.  She was also found to have a large pneumothorax on 6/26 with chest tube placed by IR which has subsequently been removed (pulled on 7/10).  She has also had recurrent A-fib with RVR requiring both cardioversion and diltiazem with amiodarone.  She was continued on treatment with steroids, nebs, antibiotics and aggressive diuresis.  Paralytics were able to be weaned off but she did require tracheostomy for long-term ventilator weaning.     Patient had worsening abdominal distention on 7/11.  She had a CT scan done that was concerning for large bowel obstruction.  Colorectal surgery was consulted and she underwent surgical repair in the early a.m. of 7/12.     #Acute on chronic hypoxic and hypercapnic respiratory failure. COPD with acute exacerbation chronically on 2-3 L/nc prn. Left-sided ptx. PNA with sputum + for klebsiella oxytoca. Pulmonary edema 2/2 fluid resuscitation for sepsis status  post tracheotomy  Complicated respiratory course-initially intubated due to progressive decline in respiratory function after admission-high airway pressures from severe bronchospasm required deep sedation including paralysis with vec.  She was also treated with 24 hours of continuous albuterol neb.  Lower respiratory track positive for Klebsiella as above along with rhinovirus.  -Patient completed steroid course.  She also completed course of IV antibiotics.  -Chest tube placed by IR on 6/26 for pneumothorax, subsequently removed on 7/10.  -Continue on DuoNebs.  Appreciate pulmonary consultation.  -IV Lasix as needed for volume overload.  Likely euvolemic at this point in the setting of postop status.  -Will need placement for continued recovery. Now looking at acute rehab. Needs to recover from surgical perspective first.  -Speech is working with the patient.  They do recommend possible downsizing of her trach.  I have asked nursing to reach out to ENT to evaluate possible downsizing.  -Appreciate intensivist assistance with management of ventilator.  -Droplet precautions     #Ileus. Large bowel obstruction s/p diverting colostomy: First tried bowel meds without improvement.  Patient had worsening abdominal distention and pain.  CT abdomen pelvis done on 7/12 showed colonic dilation with abrupt termination at the mid sigmoid colon with suggestion of pneumatosis.  Colorectal surgery was consulted.  Underwent operative repair early a.m. of 7/12.  Seem to tolerate well.  Appreciate colorectal surgery for postoperative cares.    -N.p.o., started on dextrose containing fluid for hypoglycemia.  Hopeful to start diet tomorrow his bowels have started to move today.  -Holding some nonessential medications.  We will continue amiodarone and diltiazem.  Converted levothyroxine IV formulation at slightly reduced dose.     #Paroxysmal AF/SVT: Pta on diltiazem and apixaban (BB being avoided due to severe COPD). Required DCCV x 2  during this hospitalization.    -Continue on oral amiodarone along with diltiazem.  Holding DOAC with recent surgery.  Resume once able per colorectal.  Switch to Lovenox for prophylaxis.     #Sepsis. Elevated lactate: Her hospital course has been peppered with episodic sepsis with leukocytosis/tachycardia/fever/respiratory failure etc  -resolved  -elevated lactate due to sepsis, and nebs      #Hypotension: Did require transient phenylephrine support due to deep sedation. Now resolved.      #CAUTI.  Pseudomonas UTI: Hines cath placed for critical illness, UA 6/28 with inflammatory cells with large blood. UCx with  K with pseudomonas  -rec'd 2 days of liberty followed by 4 days of cefepime, abx completed 7/6  -We will keep Hines in place for now given postoperative status.  Likely remove for voiding trial in the coming days.     #Hyperkalemia. Hypokalemia: Potassium bola to 5.5 and persisted in that range for a day or 2.  She received several doses of Lokelma and potassium level has now normalized and actually downtrending.  Unclear etiology.  Renal function is normal.  Her PTA losartan has been on hold.  Replacement as needed.  Monitor.     #Hypernatremia: Significant rise in sodium up to 160 with tube feed initiation.  Improved with high-dose free water via NG and D5, but remained at about 150.  Sodium is now normalized after dose of metolazone and Lasix drip.  Monitoring     #Hypertension pta on dilt and losartan. dilt back on and being titrated. Losartan discontinued in the setting of hyperkalemia     #Anemia  Drifting down during hospitalization and now at 6.8-otherwise hemodynamically stable. No clear bleeding noted.  Suspect critical illness/venipuncture mediated  -transfused single unit PRBC 7/8     DVT Prophylaxis: Lovenox postoperatively.  Holding DOAC per colorectal  Code Status: Full Code  Lines: Peripheral IVs, Hines catheter is in place postoperatively.  Hopefully we will be able to remove in the  coming days.  Dispo: Continue ICU cares     Discussed with intensivist.  Discussed with bedside nurse.  Tommie Ren MD    Interval History   Hypoglycemia noted overnight.  Started on D10 fluids at 65 cc/h.  Working with speech therapy.  Breathing is better.  Tolerated trach dome overnight.  Colostomy with liquid stool early this AM.    -Data reviewed today: I reviewed all new labs and imaging results over the last 24 hours.     Physical Exam   Temp: 97.5  F (36.4  C) Temp src: Temporal BP: 118/70 Pulse: 72   Resp: 24 SpO2: 99 % O2 Device: Trach dome Oxygen Delivery: 30 LPM  Vitals:    07/10/23 0600 07/11/23 0500 07/13/23 0600   Weight: 69.6 kg (153 lb 7 oz) 70.6 kg (155 lb 10.3 oz) 68.5 kg (151 lb 0.2 oz)     Vital Signs with Ranges  Temp:  [97.5  F (36.4  C)-99.3  F (37.4  C)] 97.5  F (36.4  C)  Pulse:  [66-84] 72  Resp:  [13-32] 24  BP: (113-143)/(53-87) 118/70  FiO2 (%):  [25 %-30 %] 25 %  SpO2:  [90 %-100 %] 99 %  I/O last 3 completed shifts:  In: 563.75 [I.V.:563.75]  Out: 1420 [Urine:870; Stool:550]    Constitutional: No acute distress.  Awakens to voice.  Answers appropriately.  HEENT: Normocephalic.  Trach in place.  Trach site appears clean.  Respiratory: Moving air bilaterally.    Prolonged expiratory phase.  Minimal wheeze.  Cardiovascular: Nontachycardic, irregular, no murmur  GI: Ostomy in place with some stool.  Bowel sounds appreciated.  Skin/Integumen: WWP, no rash. No edema  Neuro: Awake.  Answers appropriately.  Follows commands.    Medications     dexmedetomidine Stopped (07/10/23 1255)     dextrose 1,000 mL (07/13/23 0015)     lactated ringers Stopped (06/28/23 0011)     - MEDICATION INSTRUCTIONS -         amiodarone  200 mg Oral or Feeding Tube Daily     [Held by provider] apixaban ANTICOAGULANT  5 mg Per Feeding Tube BID     [Held by provider] atorvastatin  20 mg Per Feeding Tube Daily     budesonide  0.5 mg Nebulization BID     diltiazem  5 mg Intravenous Q4H     enoxaparin ANTICOAGULANT  40  mg Subcutaneous Q24H     ipratropium  0.5 mg Nebulization 4x daily     [Held by provider] lactulose  20 g Oral BID     levalbuterol  0.63 mg Nebulization 4x Daily     [Held by provider] levothyroxine  112 mcg Per Feeding Tube Daily     levothyroxine  88 mcg Intravenous Daily     magnesium oxide  400 mg Oral Q4H     magnesium oxide  400 mg Oral or Feeding Tube Daily     menthol   Transdermal Q8H     [Held by provider] multivitamins w/minerals  15 mL Per Feeding Tube Daily     nicotine  1 patch Transdermal Daily     nicotine   Transdermal Q8H     [Held by provider] oxyCODONE  5 mg Per Feeding Tube Q4H     [Held by provider] pantoprazole  40 mg Per Feeding Tube QAM AC     pantoprazole  40 mg Intravenous Daily with breakfast     [Held by provider] PARoxetine  20 mg Per Feeding Tube Daily     potassium & sodium phosphates  1 packet Oral or Feeding Tube Q4H     [Held by provider] protein modular  1 packet Per Feeding Tube Daily     [Held by provider] senna-docusate  2 tablet Oral BID     sodium chloride (PF)  10-40 mL Intracatheter Q8H     sodium chloride (PF)  3 mL Intracatheter Q8H       Data   Recent Labs   Lab 07/13/23  0759 07/13/23  0542 07/13/23  0426 07/12/23  1557 07/12/23  1342 07/12/23  0734 07/12/23  0443 07/11/23  0753 07/11/23  0524 07/09/23  1237 07/09/23  0443   WBC  --  6.1  --   --   --   --  6.9  --  7.3   < > 7.1   HGB  --  7.9*  --   --   --   --  8.6*  --  8.2*   < > 7.6*   MCV  --  101*  --   --   --   --  100  --  104*   < > 100   PLT  --  181  --   --   --   --  181  --  177   < > 148*   NA  --  137  --   --   --   --  138  --  138   < > 140   POTASSIUM  --  3.1*  --   --  4.0  --  3.1*  --  4.0   < > 3.8   CHLORIDE  --  102  --   --   --   --  101  --  102   < > 105   CO2  --  27  --   --   --   --  27  --  28   < > 29   BUN  --  9.9  --   --   --   --  12.8  --  15.6   < > 21.1   CR  --  0.46*  --   --   --   --  0.46*  --  0.48*   < > 0.41*   ANIONGAP  --  8  --   --   --   --  10  --  8   < >  6*   EDIN  --  7.7*  --   --   --   --  8.3*  --  8.6   < > 8.4*   * 158* 148*   < >  --    < > 94   < > 109*   < > 139*   ALBUMIN  --   --   --   --   --   --  2.8*  --   --   --  2.9*   PROTTOTAL  --   --   --   --   --   --  5.6*  --   --   --  5.7*   BILITOTAL  --   --   --   --   --   --  0.5  --   --   --  0.3   ALKPHOS  --   --   --   --   --   --  82  --   --   --  63   ALT  --   --   --   --   --   --  53*  --   --   --  78*   AST  --   --   --   --   --   --  28  --   --   --  25    < > = values in this interval not displayed.       No results found for this or any previous visit (from the past 24 hour(s)).

## 2023-07-13 NOTE — PROGRESS NOTES
COLON & RECTAL SURGERY  PROGRESS NOTE    July 13, 2023  Post-op Day # 1    SUBJECTIVE:  Patient denies any abdominal pain, nausea and vomiting. +BM/gas from stoma.     OBJECTIVE:  Temp:  [97.5  F (36.4  C)-99.3  F (37.4  C)] 97.5  F (36.4  C)  Pulse:  [61-84] 68  Resp:  [13-32] 18  BP: (100-143)/(53-87) 132/74  FiO2 (%):  [25 %-30 %] 25 %  SpO2:  [90 %-100 %] 99 %    Intake/Output Summary (Last 24 hours) at 7/13/2023 0943  Last data filed at 7/13/2023 0800  Gross per 24 hour   Intake 463.75 ml   Output 1420 ml   Net -956.25 ml        GENERAL:  Awake, alert, no acute distress, lying in bed  HEAD: Nomocephalic atraumatic  SCLERA: anicteric  EXTREMITIES: warm and well perfused  ABDOMEN:  Soft, appropriately tender, non-distended, no rebound or guarding, no peritoneal signs. Stoma viable with stool in the bag. RRC bridge under stoma.   INCISION:  C/d/i    LABS:  Lab Results   Component Value Date    WBC 6.1 07/13/2023    WBC 6.2 02/02/2006     Lab Results   Component Value Date    HGB 7.9 07/13/2023    HGB 10.8 02/02/2006     Lab Results   Component Value Date    HCT 25.3 07/13/2023    HCT 30.4 02/02/2006     Lab Results   Component Value Date     07/13/2023     02/02/2006     Last Basic Metabolic Panel:  Lab Results   Component Value Date     07/13/2023     02/02/2006      Lab Results   Component Value Date    POTASSIUM 3.1 07/13/2023    POTASSIUM 5.1 06/21/2023    POTASSIUM 4.5 05/17/2022    POTASSIUM 3.4 02/02/2006     Lab Results   Component Value Date    CHLORIDE 102 07/13/2023    CHLORIDE 99 06/21/2023    CHLORIDE 103 05/17/2022    CHLORIDE 109 02/02/2006     Lab Results   Component Value Date    EDIN 7.7 07/13/2023    EDIN 7.8 02/02/2006     Lab Results   Component Value Date    CO2 27 07/13/2023    CO2 29 05/17/2022    CO2 17 02/02/2006     Lab Results   Component Value Date    BUN 9.9 07/13/2023    BUN 7 06/21/2023    BUN 17 05/17/2022    BUN <2 02/02/2006     Lab Results   Component  Value Date    CR 0.46 07/13/2023    CR 0.80 02/02/2006     Lab Results   Component Value Date     07/13/2023     07/13/2023     05/17/2022    GLC 90 02/02/2006       ASSESSMENT/PLAN:  Ewa is a 57 year old woman who is POD#1 s/p diverting colostomy for a large bowel obstruction.     - NPO. Restart tube feeds tomorrow.   - Pain management as needed  - WOCN for stoma cares and teaching   - Lovenox for ppx  - Supportive cares per ICU team.     For questions/paging, please contact the CRS office at [phone].    Nicole Kiran PA-C  [facility]  Phone: [phone]

## 2023-07-13 NOTE — CONSULTS
[facility] Nurse Inpatient Assessment     Consulted for: colostomy due to LBO  Pre-operative diagnosis:         Large bowel obstruction  Post-operative diagnosis        Same as pre-operative diagnosis      Patient History (according to provider note(s):      Acute on chronic hypoxemic and hypercapnic respiratory failure  COPD with acute exacerbation: Has had increasing shortness of breath for a few days secondary to the poor air quality this week.  Has had a cough nonproductive sputum.  Started a Z-Bennett 2 days ago and prednisone 40 mg/day for 1 day then 30 mg/day for 2 days.  Increasing shortness of breath, wheezing, and chest tightness despite using nebs frequently.  PTA on Advair and DuoNebs and albuterol nebs as needed.  Has 2 L O2 at home as needed, but usually does not use.  Tachypneic and tachycardic in the ER.  Hypercapnic initially with VBG showing pH 7.24, PCO2 69, PO2 223, bicarb 29.  Repeat VBG on BiPAP is much improved.  Chest x-ray shows hyperinflated lungs without any new infiltrate.  Slight leukocytosis at 11.8, but has been on prednisone and procalcitonin 0.04.  Received 125 mg IV Solu-Medrol in route    Assessment:      Assessment of new end Colostomy:  Diagnosis Pertinent to Stoma: Bowel Obstruction      Surgery Date: 7/12/2023  Surgeon:Dr Jaspal Rashid        [facility]: [facility]  Pouching system in place on assessment today: Emmet one piece, cut to fit, flat and barrier ring   Pouch barrier status: Leaking at 6 o clock  Pouch last changed/wear time: <24 hour  Reason for pouch change today: leakage and initial post-op assessment  Effectiveness of current pouching/ supply plan: due to red donn catheter presence   Change made with ostomy management today: Yes  Pouching system placed today: Emmet one piece, cut to fit, flat and barrier ring   Supplies: ordered pouches [id number] cera rings [id number]  Last photo: none taken too busy with output  Stoma location: LUQ  Stoma size:  "oval vertical inches, Red robbinson catheter intubating the stoma  Stoma appearance: viable, healthy, red, oval and moist  Mucocutaneous junction:  intact  Peristomal complication(s): none   Output: soft, toothpaste consistency and brown  Output volume emptied during visit: 10ml  Abdominal assessment: Soft and Distended  Surgical site(s): open to air  NG still in place? No  Pain: denies   Is patient still on a PCA? No    Ostomy education assessment:  Participant of teaching session today: patient  and nurse  Education completed today: Initial fitting, Pouching system assessment  and Evaluate leakage issue  Educational materials/methods: Verbal and Demonstration  Education still needed: Initial fitting  Learning Comprehension:   Psychosocial assessment: alert has tracheostomy unable to talk out loud, can mouth the worse   Patient readiness for education today: observing  Following today's visit: patient  and nurse is able to demonstrate;         1. How to empty their pouch? No        2. How to change their pouch? No        3. How to read and record intake and output correctly? No  Preparation for discharge completed: Placed prescription recommendations in discharge navigator for MD to sign  Preparation for discharge still needed: Ensured patient has extra supplies for discharge  Pt support system on discharge: family and 2 daughter   [facility] recommend home care? No and going to LTACH  Face to face time: 45 minutes          Treatment Plan:     LUQ Colostomy pouching plan:   Pouching system: ostomy supplies pouches: [place] FECAL ([id number])   Accessories used: [facility] ostomy accessories: 2\" Cera Barrier Ring ([id number]), Powder ([id number]) and M9 Drops ([id number])   Frequency of pouch changes: Three times a week  [facility] follow up plan: Daily Monday-Friday (as able)  Bedside RN interventions: Change pouch PRN if leaking using the supplies above, Empty pouch when 1/3 to 1/2 full, ensure to clean pouch outlet after emptying to prevent " odor, Notify WOC for ongoing pouch leakage and Document stoma appearance and output volume, color, and consistency every shift      Orders: Written    RECOMMEND PRIMARY TEAM ORDER: None, at this time  Education provided: plan of care and leakage of some thin liquid noted coming from ostomy edge not inspected  Discussed plan of care with: Patient and Nurse  WOC nurse follow-up plan: daily  Notify WOC if wound(s) deteriorate.  Nursing to notify the Provider(s) and re-consult the WOC Nurse if new skin concern.    DATA:     Current support surface: Standard  Low air loss (YONAS pump, Isolibrium, Pulsate, skin guard, etc)  Containment of urine/stool: Incontinence Protocol, Incontinent pad in bed and Indwelling catheter  BMI: Body mass index is 26.75 kg/m .   Active diet order: Orders Placed This Encounter      NPO for Medical/Clinical Reasons Except for: Meds     Output: I/O last 3 completed shifts:  In: 563.75 [I.V.:563.75]  Out: 1420 [Urine:870; Stool:550]     Labs: Recent Labs   Lab 07/13/23  0542 07/12/23  0443   ALBUMIN  --  2.8*   HGB 7.9* 8.6*   WBC 6.1 6.9     Pressure injury risk assessment:   Sensory Perception: 3-->slightly limited  Moisture: 3-->occasionally moist  Activity: 1-->bedfast  Mobility: 2-->very limited  Nutrition: 3-->adequate  Friction and Shear: 2-->potential problem  Lee Score: 14    HOA Golden RN CWOCN  Pager no longer is use, please contact through [employer] group: [facility] Nurse [place]   Dept. Office Number: [phone]

## 2023-07-13 NOTE — PROGRESS NOTES
SLP NOTE: The patient was seen for tx while up in the chair. She tolerated short PMSV placement to voice and communicate basic wants and needs, but her voice was strangled and the she experienced increased WOB with PMSV in place. Suspect large trach size (shiley flex 8.5) is a barrier to PMSV tolerance at this time. Recommend the patient continue trach dome with cuff deflated as tolerated. Rec PMSV placement with SLP and RT only at this time. Further suggest ENT consult for consideration of trach downsize. SLP is following daily.

## 2023-07-13 NOTE — PROGRESS NOTES
Pt did passy catrachita valve trial today with speech while on the HFTD on 25L 30% FIO2.    Pt did well with her speaking valve trial for 10 minutes and then became increasingly SOB and had increased WOB.      After 10 minutes speaking valve trial was terminated due to respiratory distress.  Plan moving forward is to have her do speaking valve trials again tomorrow but only with speech or RT until she is more stable during her speaking valve trials.    Will try again tomorrow.    Will continue to follow and assess and make changes as needed.    Cortney Martin, RT on 7/13/2023 at 12:58 PM

## 2023-07-13 NOTE — PLAN OF CARE
Goal Outcome Evaluation:                            ICU End of Shift Summary.  For vital signs and complete assessments, please see documentation flowsheets.      Pertinent assessments:   Neuro: AO x4, denies pain. afebrile  Cardiac: SR with PAC's. BP WNL  Resp: Trache dome 25%, 25L. LS coarse. Secretions large.  GI: NPO with colostomy in place with brownish output  : Hines in place  Skin: Ni issues  Lines: PICC line and 1 PIV's  Drips: D10 65ml    Major Shift Events:   > Tried on PMSV by SLP with increase WOB breathing. Suggested to put on smaller size of trache  > Sit on the chair.   Plan (Upcoming Events): To be review by dietician regarding feeding. ENT consultation    Discharge/Transfer Needs: TBD     Bedside Shift Report Completed : Y  Bedside Safety Check Completed: Y

## 2023-07-13 NOTE — PROGRESS NOTES
Pt is maintaining well on HFTD for PEEP support.    Pt is currently on HFTD 25 L 30% FIO2.    Pt's BS are diminished with sat's in the high 90's.    There was a slight amount of blood around the pt's stoma, otherwise trach looks great.    Will continue to follow and assess and make changes as needed.    Cortney Martin, RT on 7/13/2023 at 12:55 PM

## 2023-07-13 NOTE — PROGRESS NOTES
Respiratory Care Note:    Received pt on HFTD 30LPM, 25%. Pt remained on HFTD t/o shift. Trach cares done and inner cannula changed. Pt given Xopenex/Atrovent and Pulmicort as ordered. Suctioned for moderate thick tan secretions. Will continue to assess and monitor.    Renny Gamboa, RT on 7/13/2023 at 5:01 AM

## 2023-07-14 ENCOUNTER — APPOINTMENT (OUTPATIENT)
Dept: PHYSICAL THERAPY | Facility: CLINIC | Age: 58
End: 2023-07-14
Payer: COMMERCIAL

## 2023-07-14 ENCOUNTER — APPOINTMENT (OUTPATIENT)
Dept: OCCUPATIONAL THERAPY | Facility: CLINIC | Age: 58
End: 2023-07-14
Payer: COMMERCIAL

## 2023-07-14 ENCOUNTER — APPOINTMENT (OUTPATIENT)
Dept: SPEECH THERAPY | Facility: CLINIC | Age: 58
End: 2023-07-14
Payer: COMMERCIAL

## 2023-07-14 LAB
ANION GAP SERPL CALCULATED.3IONS-SCNC: 9 MMOL/L (ref 7–15)
BUN SERPL-MCNC: 5.8 MG/DL (ref 8–23)
CALCIUM SERPL-MCNC: 7.8 MG/DL (ref 8.6–10)
CHLORIDE SERPL-SCNC: 100 MMOL/L (ref 98–107)
CREAT SERPL-MCNC: 0.43 MG/DL (ref 0.51–0.95)
DEPRECATED HCO3 PLAS-SCNC: 25 MMOL/L (ref 22–29)
ERYTHROCYTE [DISTWIDTH] IN BLOOD BY AUTOMATED COUNT: 16.5 % (ref 10–15)
GFR SERPL CREATININE-BSD FRML MDRD: >90 ML/MIN/1.73M2
GLUCOSE BLDC GLUCOMTR-MCNC: 120 MG/DL (ref 70–99)
GLUCOSE BLDC GLUCOMTR-MCNC: 132 MG/DL (ref 70–99)
GLUCOSE BLDC GLUCOMTR-MCNC: 93 MG/DL (ref 70–99)
GLUCOSE BLDC GLUCOMTR-MCNC: 97 MG/DL (ref 70–99)
GLUCOSE SERPL-MCNC: 139 MG/DL (ref 70–99)
HCT VFR BLD AUTO: 25.2 % (ref 35–47)
HGB BLD-MCNC: 8.2 G/DL (ref 11.7–15.7)
MAGNESIUM SERPL-MCNC: 1.6 MG/DL (ref 1.7–2.3)
MCH RBC QN AUTO: 32.3 PG (ref 26.5–33)
MCHC RBC AUTO-ENTMCNC: 32.5 G/DL (ref 31.5–36.5)
MCV RBC AUTO: 99 FL (ref 78–100)
PHOSPHATE SERPL-MCNC: 2.4 MG/DL (ref 2.5–4.5)
PLATELET # BLD AUTO: 185 10E3/UL (ref 150–450)
POTASSIUM SERPL-SCNC: 3.2 MMOL/L (ref 3.4–5.3)
POTASSIUM SERPL-SCNC: 3.7 MMOL/L (ref 3.4–5.3)
RBC # BLD AUTO: 2.54 10E6/UL (ref 3.8–5.2)
SODIUM SERPL-SCNC: 134 MMOL/L (ref 136–145)
WBC # BLD AUTO: 5.1 10E3/UL (ref 4–11)

## 2023-07-14 PROCEDURE — 250N000009 HC RX 250: Performed by: INTERNAL MEDICINE

## 2023-07-14 PROCEDURE — 250N000013 HC RX MED GY IP 250 OP 250 PS 637: Performed by: INTERNAL MEDICINE

## 2023-07-14 PROCEDURE — 97530 THERAPEUTIC ACTIVITIES: CPT | Mod: GP | Performed by: PHYSICAL THERAPIST

## 2023-07-14 PROCEDURE — 250N000011 HC RX IP 250 OP 636: Mod: JZ | Performed by: INTERNAL MEDICINE

## 2023-07-14 PROCEDURE — 85027 COMPLETE CBC AUTOMATED: CPT | Performed by: INTERNAL MEDICINE

## 2023-07-14 PROCEDURE — 97110 THERAPEUTIC EXERCISES: CPT | Mod: GP | Performed by: PHYSICAL THERAPIST

## 2023-07-14 PROCEDURE — 83735 ASSAY OF MAGNESIUM: CPT | Performed by: HOSPITALIST

## 2023-07-14 PROCEDURE — 250N000013 HC RX MED GY IP 250 OP 250 PS 637: Performed by: SURGERY

## 2023-07-14 PROCEDURE — 99233 SBSQ HOSP IP/OBS HIGH 50: CPT | Performed by: HOSPITALIST

## 2023-07-14 PROCEDURE — 250N000013 HC RX MED GY IP 250 OP 250 PS 637: Performed by: HOSPITALIST

## 2023-07-14 PROCEDURE — 250N000009 HC RX 250: Performed by: HOSPITALIST

## 2023-07-14 PROCEDURE — 82310 ASSAY OF CALCIUM: CPT | Performed by: INTERNAL MEDICINE

## 2023-07-14 PROCEDURE — 84100 ASSAY OF PHOSPHORUS: CPT | Performed by: HOSPITALIST

## 2023-07-14 PROCEDURE — 94640 AIRWAY INHALATION TREATMENT: CPT

## 2023-07-14 PROCEDURE — 94640 AIRWAY INHALATION TREATMENT: CPT | Mod: 76

## 2023-07-14 PROCEDURE — 84132 ASSAY OF SERUM POTASSIUM: CPT | Performed by: HOSPITALIST

## 2023-07-14 PROCEDURE — 999N000157 HC STATISTIC RCP TIME EA 10 MIN

## 2023-07-14 PROCEDURE — 200N000001 HC R&B ICU

## 2023-07-14 PROCEDURE — 258N000001 HC RX 258: Performed by: HOSPITALIST

## 2023-07-14 PROCEDURE — 250N000011 HC RX IP 250 OP 636: Mod: JZ | Performed by: HOSPITALIST

## 2023-07-14 PROCEDURE — C9113 INJ PANTOPRAZOLE SODIUM, VIA: HCPCS | Mod: JZ | Performed by: HOSPITALIST

## 2023-07-14 PROCEDURE — 92507 TX SP LANG VOICE COMM INDIV: CPT | Mod: GN

## 2023-07-14 PROCEDURE — 999N000215 HC STATISTIC HFNC ADULT NON-CPAP

## 2023-07-14 RX ORDER — MAGNESIUM OXIDE 400 MG/1
400 TABLET ORAL EVERY 4 HOURS
Status: DISPENSED | OUTPATIENT
Start: 2023-07-14 | End: 2023-07-14

## 2023-07-14 RX ORDER — POTASSIUM CHLORIDE 20MEQ/15ML
20 LIQUID (ML) ORAL ONCE
Status: COMPLETED | OUTPATIENT
Start: 2023-07-14 | End: 2023-07-14

## 2023-07-14 RX ORDER — POTASSIUM CHLORIDE 1.5 G/1.58G
40 POWDER, FOR SOLUTION ORAL ONCE
Status: COMPLETED | OUTPATIENT
Start: 2023-07-14 | End: 2023-07-14

## 2023-07-14 RX ADMIN — ACETAMINOPHEN 650 MG: 325 SOLUTION ORAL at 16:50

## 2023-07-14 RX ADMIN — MAGNESIUM OXIDE TAB 400 MG (241.3 MG ELEMENTAL MG) 400 MG: 400 (241.3 MG) TAB at 08:33

## 2023-07-14 RX ADMIN — POTASSIUM & SODIUM PHOSPHATES POWDER PACK 280-160-250 MG 1 PACKET: 280-160-250 PACK at 16:50

## 2023-07-14 RX ADMIN — LEVALBUTEROL HYDROCHLORIDE 0.63 MG: 0.63 SOLUTION RESPIRATORY (INHALATION) at 11:03

## 2023-07-14 RX ADMIN — LEVALBUTEROL HYDROCHLORIDE 0.63 MG: 0.63 SOLUTION RESPIRATORY (INHALATION) at 19:55

## 2023-07-14 RX ADMIN — LEVALBUTEROL HYDROCHLORIDE 0.63 MG: 0.63 SOLUTION RESPIRATORY (INHALATION) at 15:00

## 2023-07-14 RX ADMIN — POTASSIUM & SODIUM PHOSPHATES POWDER PACK 280-160-250 MG 1 PACKET: 280-160-250 PACK at 12:28

## 2023-07-14 RX ADMIN — POTASSIUM CHLORIDE 20 MEQ: 20 SOLUTION ORAL at 20:32

## 2023-07-14 RX ADMIN — IPRATROPIUM BROMIDE 0.5 MG: 0.5 SOLUTION RESPIRATORY (INHALATION) at 11:03

## 2023-07-14 RX ADMIN — PANTOPRAZOLE SODIUM 40 MG: 40 INJECTION, POWDER, FOR SOLUTION INTRAVENOUS at 08:31

## 2023-07-14 RX ADMIN — DEXTROSE MONOHYDRATE 1000 ML: 100 INJECTION, SOLUTION INTRAVENOUS at 05:55

## 2023-07-14 RX ADMIN — IPRATROPIUM BROMIDE 0.5 MG: 0.5 SOLUTION RESPIRATORY (INHALATION) at 15:01

## 2023-07-14 RX ADMIN — DILTIAZEM HYDROCHLORIDE 5 MG: 5 INJECTION, SOLUTION INTRAVENOUS at 08:38

## 2023-07-14 RX ADMIN — DILTIAZEM HYDROCHLORIDE 5 MG: 5 INJECTION, SOLUTION INTRAVENOUS at 00:47

## 2023-07-14 RX ADMIN — MAGNESIUM OXIDE TAB 400 MG (241.3 MG ELEMENTAL MG) 400 MG: 400 (241.3 MG) TAB at 12:28

## 2023-07-14 RX ADMIN — BUDESONIDE 0.5 MG: 0.5 INHALANT ORAL at 07:40

## 2023-07-14 RX ADMIN — DILTIAZEM HYDROCHLORIDE 5 MG: 5 INJECTION, SOLUTION INTRAVENOUS at 12:27

## 2023-07-14 RX ADMIN — AMIODARONE HYDROCHLORIDE 200 MG: 200 TABLET ORAL at 08:33

## 2023-07-14 RX ADMIN — LEVOTHYROXINE SODIUM 88 MCG: 20 INJECTION, SOLUTION INTRAVENOUS at 08:55

## 2023-07-14 RX ADMIN — NICOTINE 1 PATCH: 14 PATCH, EXTENDED RELEASE TRANSDERMAL at 08:36

## 2023-07-14 RX ADMIN — DILTIAZEM HYDROCHLORIDE 5 MG: 5 INJECTION, SOLUTION INTRAVENOUS at 04:50

## 2023-07-14 RX ADMIN — IPRATROPIUM BROMIDE 0.5 MG: 0.5 SOLUTION RESPIRATORY (INHALATION) at 19:55

## 2023-07-14 RX ADMIN — ACETAMINOPHEN 650 MG: 325 SOLUTION ORAL at 03:36

## 2023-07-14 RX ADMIN — LEVALBUTEROL HYDROCHLORIDE 0.63 MG: 0.63 SOLUTION RESPIRATORY (INHALATION) at 07:40

## 2023-07-14 RX ADMIN — POTASSIUM CHLORIDE FOR ORAL SOLUTION 40 MEQ: 1.5 POWDER, FOR SOLUTION ORAL at 08:34

## 2023-07-14 RX ADMIN — BUDESONIDE 0.5 MG: 0.5 INHALANT ORAL at 19:55

## 2023-07-14 RX ADMIN — DILTIAZEM HYDROCHLORIDE 5 MG: 5 INJECTION, SOLUTION INTRAVENOUS at 16:49

## 2023-07-14 RX ADMIN — IPRATROPIUM BROMIDE 0.5 MG: 0.5 SOLUTION RESPIRATORY (INHALATION) at 07:40

## 2023-07-14 RX ADMIN — POTASSIUM & SODIUM PHOSPHATES POWDER PACK 280-160-250 MG 1 PACKET: 280-160-250 PACK at 08:34

## 2023-07-14 RX ADMIN — ENOXAPARIN SODIUM 40 MG: 40 INJECTION SUBCUTANEOUS at 20:32

## 2023-07-14 RX ADMIN — DILTIAZEM HYDROCHLORIDE 5 MG: 5 INJECTION, SOLUTION INTRAVENOUS at 20:39

## 2023-07-14 ASSESSMENT — ACTIVITIES OF DAILY LIVING (ADL)
ADLS_ACUITY_SCORE: 32
ADLS_ACUITY_SCORE: 32
ADLS_ACUITY_SCORE: 40
ADLS_ACUITY_SCORE: 36
ADLS_ACUITY_SCORE: 32
ADLS_ACUITY_SCORE: 40
ADLS_ACUITY_SCORE: 32
ADLS_ACUITY_SCORE: 40
ADLS_ACUITY_SCORE: 40

## 2023-07-14 NOTE — PROGRESS NOTES
The HFTD was applied to pt @ 25LPM and 21% for heat and humidity therapy.     Skin integrity is good  Skin interventions NA  Trach site was cleaned, inner cannula changed and patency maintained.    Patient trial'd speaking valve this afternoon.  Tolerated 15 minutes with speech and less than one minute for myself.    Temp: 98  F (36.7  C) Temp src: Axillary BP: 113/72 Pulse: 80   Resp: 24 SpO2: 95 % O2 Device: Trach dome Oxygen Delivery: 25 LPM     Will continue to monitor, no complications noted at this time.    Margaret Peña, RT

## 2023-07-14 NOTE — PROGRESS NOTES
Respiratory Care Note:     Received pt on HFTD 25LPM, 21%. Pt remained on HFTD t/o shift. Trach cares done and inner cannula changed. Pt given Xopenex/Atrovent and Pulmicort as ordered. Suctioned for moderate thick tan secretions. Will continue to assess and monitor.     Renny Gamboa, RT on 7/14/2023 at 5:05 AM

## 2023-07-14 NOTE — PLAN OF CARE
Goal Outcome Evaluation:      Plan of Care Reviewed With: patient          ICU End of Shift Summary.  For vital signs and complete assessments, please see documentation flowsheets.      Pertinent assessments:   Neuro:A+OX4, able to make needs known  Cardiac:A Fib,controlled rate  Resp:Trach dome all night, 25% O2 AND 25L Flow  GI:Colostomy draining soft/liquid stool,small BM    :Hines draining adequate urine  Skin:No issues  Lines:TL PICC SLX 2  Drips:D10 at 65cc/hr    Major Shift Events: Uneventful    Plan (Upcoming Events): Increase activity as tolerated  Discharge/Transfer Needs: TBD     Bedside Shift Report Completed : YES  Bedside Safety Check Completed: yes

## 2023-07-14 NOTE — PLAN OF CARE
Patient Afebrile. Pain occasional in abdomen gave Tylenol x 1 resolved.  Neuro: A & O x 4  Cardiac: Tele: Afib controled. BP stable. HR mid 70's.  Resp:  LS clear to coarse when in need of suctioning. Several times suctioned per her request. Thick secretions cream to white in color. Trach dome 25L  : Hines in place 1000 UO yellow in color.  GI: Bowel sounds present. Colostomy in place 325 output watery, brown. In addition 5 stools soft, brown. Kio feed in place. Started Tube feedings 1400 at 25/hr 30 Q4 flushes. BG checks <140  Skin: see flow sheet.  Lines: PICC and PIV Saline locked.     PT and OT worked with patient. Had up to chair for 2-3 hours.  Care coordinator working on placement in a long term care facility.

## 2023-07-14 NOTE — PLAN OF CARE
Problem: COPD (Chronic Obstructive Pulmonary Disease)  Goal: Improved Nutrition Intake  Outcome: Not Progressing     Problem: Enteral Nutrition  Goal: Feeding Tolerance  Outcome: Not Progressing     Goal Outcome Evaluation:      Plan of Care Reviewed With: other (see comments) (chart review)    Overall Patient Progress: decliningOverall Patient Progress: declining    Outcome Evaluation: Per chart pt has had TF on hold dt large bowel obstruction s/p repair. Ok to resume TF per rounds Physicians. Initiated TF with Promote @ 25m/hr and if tolerated increase by 20ml every 8 hours to goal of 65ml/hr x 22 hrs (hold for 1 hr before and after levothyroxine)    At goal, Promote will provide 1430kcals, 89g protein, 186g CHO, 37g fat, 1200ml free water, 180ml water from flushes for a total of 1380ml fluid daily.

## 2023-07-14 NOTE — PROGRESS NOTES
"Colon & Rectal Surgery Progress Note             Interval History:   Postop Day #: 2  Stoma functioning. Denies nausea. Remains ventilated with trach.  Minimal abd pain                Medications:   I have reviewed this patient's current medications               Physical Exam:   Blood pressure 113/72, pulse 64, temperature 98.1  F (36.7  C), temperature source Axillary, resp. rate 20, height 1.6 m (5' 3\"), weight 69.3 kg (152 lb 12.5 oz), SpO2 94 %.    Intake/Output Summary (Last 24 hours) at 7/14/2023 0829  Last data filed at 7/14/2023 0800  Gross per 24 hour   Intake 1740 ml   Output 1885 ml   Net -145 ml     GEN:  alert  ABD:  Soft, stoma red, productive, RRC bridge in place         Data:        Lab Results   Component Value Date     07/14/2023     02/02/2006    Lab Results   Component Value Date    CHLORIDE 100 07/14/2023    CHLORIDE 99 06/21/2023    CHLORIDE 103 05/17/2022    CHLORIDE 109 02/02/2006    Lab Results   Component Value Date    BUN 5.8 07/14/2023    BUN 7 06/21/2023    BUN 17 05/17/2022    BUN <2 02/02/2006      Lab Results   Component Value Date    POTASSIUM 3.2 07/14/2023    POTASSIUM 5.1 06/21/2023    POTASSIUM 4.5 05/17/2022    POTASSIUM 3.4 02/02/2006    Lab Results   Component Value Date    CO2 25 07/14/2023    CO2 29 05/17/2022    CO2 17 02/02/2006    Lab Results   Component Value Date    CR 0.43 07/14/2023    CR 0.46 07/13/2023    CR 0.80 02/02/2006    CR 0.90 02/01/2006        Lab Results   Component Value Date    HGB 8.2 (L) 07/14/2023    HGB 7.9 (L) 07/13/2023     Lab Results   Component Value Date     07/14/2023     07/13/2023     Lab Results   Component Value Date    WBC 5.1 07/14/2023    WBC 6.1 07/13/2023            Assessment and Plan:   Doing well from CRS point of view. Okay to start Tube feeds and increase as tolerated.  Stoma care and teaching  Supportive care per ICU      Laurita Marques MD  [facility]  Office Phone # " [phone]

## 2023-07-14 NOTE — PROGRESS NOTES
[facility]    Hospitalist Progress Note      Assessment & Plan   Lara Melendez is a 57 year old female with a history of COPD on prn home oxygen 2-3 L/nc, tobacco dependence, htn/hlp, PAF chronically on apixaban, anxiety, hypothyroidism, psoriasis admitted on 6/20/2023 with SOB and wheezing.     Patient has had a protracted admission here at [facility].  Her symptoms reportedly started a few days prior to admission and she called her pulmonologist who initiated empiric azithromycin and prednisone.  Symptoms continued to worsen so EMS was called and she was brought to the ER.  Her respiratory status continued to decline and she required intubation the day after admission.  She has been treated for community-acquired pneumonia along with COPD exacerbation with steroids and antibiotics.  While on the ventilator, patient had significant bronchospasm and high airway pressures requiring deep sedation and paralysis.  [facility] was actually consulted for possibility of ECMO but she was not deemed an appropriate candidate.  She was also found to have a large pneumothorax on 6/26 with chest tube placed by IR which has subsequently been removed (pulled on 7/10).  She has also had recurrent A-fib with RVR requiring both cardioversion and diltiazem with amiodarone.  She was continued on treatment with steroids, nebs, antibiotics and aggressive diuresis.  Paralytics were able to be weaned off but she did require tracheostomy for long-term ventilator weaning.     Patient had worsening abdominal distention on 7/11.  She had a CT scan done that was concerning for large bowel obstruction.  Colorectal surgery was consulted and she underwent surgical repair in the early a.m. of 7/12.    Plan for today:   -Re-initiated tube feeds.   -Continue therapies  -Question transition off lovenox back to eliquis therapy. Will defer to surgical team timing.  Discussed on rounds.   -Restarted meds that were held post-op.      #Acute  on chronic hypoxic and hypercapnic respiratory failure. COPD with acute exacerbation chronically on 2-3 L/nc prn. Left-sided ptx. PNA with sputum + for klebsiella oxytoca. Pulmonary edema 2/2 fluid resuscitation for sepsis status post tracheotomy  Complicated respiratory course-initially intubated due to progressive decline in respiratory function after admission-high airway pressures from severe bronchospasm required deep sedation including paralysis with vec.  She was also treated with 24 hours of continuous albuterol neb.  Lower respiratory track positive for Klebsiella as above along with rhinovirus.  -Patient completed steroid course.  She also completed course of IV antibiotics.  -Chest tube placed by IR on 6/26 for pneumothorax, subsequently removed on 7/10.  -Continue on DuoNebs.  Appreciate pulmonary consultation.  -IV Lasix as needed for volume overload.  Likely euvolemic at this point in the setting of postop status.  -Will need placement for continued recovery. Now looking at acute rehab. Needs to recover from surgical perspective first.  -Speech is working with the patient.  They do recommend possible downsizing of her trach.  I have asked nursing to reach out to ENT to evaluate possible downsizing.  -Appreciate intensivist assistance with management of ventilator.  -Droplet precautions     #Ileus. Large bowel obstruction s/p diverting colostomy: First tried bowel meds without improvement.  Patient had worsening abdominal distention and pain.  CT abdomen pelvis done on 7/12 showed colonic dilation with abrupt termination at the mid sigmoid colon with suggestion of pneumatosis.  Colorectal surgery was consulted.  Underwent operative repair early a.m. of 7/12.  Seem to tolerate well.  Appreciate colorectal surgery for postoperative cares.    -Starting on tube feeds again on 7/14.  -We have been holding some nonessential medications per surgery team recommendations initially.  Reinitiation of tube feeds,  these meds have now been resumed.     #Paroxysmal AF/SVT: Pta on diltiazem and apixaban (BB being avoided due to severe COPD). Required DCCV x 2 during this hospitalization.    -Continue on oral amiodarone along with diltiazem.  Holding DOAC with recent surgery.  Resume once able per colorectal.  Switch to Lovenox for prophylaxis.     #Sepsis. Elevated lactate: Her hospital course has been peppered with episodic sepsis with leukocytosis/tachycardia/fever/respiratory failure etc  -resolved  -elevated lactate due to sepsis, and nebs      #Hypotension: Did require transient phenylephrine support due to deep sedation. Now resolved.      #CAUTI.  Pseudomonas UTI: Hines cath placed for critical illness, UA 6/28 with inflammatory cells with large blood. UCx with  K with pseudomonas  -rec'd 2 days of liberty followed by 4 days of cefepime, abx completed 7/6  -We will keep Hines in place for now given postoperative status.  Likely remove for voiding trial in the coming days.     #Hyperkalemia. Hypokalemia: Potassium bola to 5.5 and persisted in that range for a day or 2.  She received several doses of Lokelma and potassium level has now normalized and actually downtrending.  Unclear etiology.  Renal function is normal.  Her PTA losartan has been on hold.  Replacement as needed.  Monitor.     #Hypernatremia: Significant rise in sodium up to 160 with tube feed initiation.  Improved with high-dose free water via NG and D5, but remained at about 150.  Sodium is now normalized after dose of metolazone and Lasix drip.  Monitoring     #Hypertension pta on dilt and losartan. dilt back on and being titrated. Losartan discontinued in the setting of hyperkalemia     #Anemia  Drifting down during hospitalization and now at 6.8-otherwise hemodynamically stable. No clear bleeding noted.  Suspect critical illness/venipuncture mediated  -transfused single unit PRBC 7/8     DVT Prophylaxis: Lovenox postoperatively.  Holding DOAC per  colorectal  Code Status: Full Code  Lines: Peripheral IVs, Hines catheter is in place postoperatively.  Hopefully we will be able to remove in the coming days.  Dispo: Continue ICU cares     Discussed with intensivist.  Discussed with bedside nurse.    I did update , by phone.     Tommie Ren MD    Interval History   No events.  Having some abdominal discomfort but unchanged from yesterday.  Still with stool output.  Loose.  No blood.  Denies chest pain.  Seems to be tolerating trach dome.    -Data reviewed today: I reviewed all new labs and imaging results over the last 24 hours.     Physical Exam   Temp: 98.1  F (36.7  C) Temp src: Axillary BP: 113/72 Pulse: 64   Resp: 20 SpO2: 94 % O2 Device: Trach dome Oxygen Delivery: 25 LPM  Vitals:    07/11/23 0500 07/13/23 0600 07/14/23 0400   Weight: 70.6 kg (155 lb 10.3 oz) 68.5 kg (151 lb 0.2 oz) 69.3 kg (152 lb 12.5 oz)     Vital Signs with Ranges  Temp:  [97.3  F (36.3  C)-98.5  F (36.9  C)] 98.1  F (36.7  C)  Pulse:  [64-78] 64  Resp:  [18-29] 20  BP: (113-134)/(67-87) 113/72  FiO2 (%):  [21 %-25 %] 21 %  SpO2:  [93 %-99 %] 94 %  I/O last 3 completed shifts:  In: 1880 [I.V.:1770; NG/GT:110]  Out: 1610 [Urine:935; Stool:675]    Constitutional: No acute distress.  Awakens to voice.  Answers appropriately.  HEENT: Normocephalic.  Trach in place.  Trach site appears clean.  Respiratory: Moving air bilaterally.    Prolonged expiratory phase.  Minimal wheeze.  Cardiovascular: Nontachycardic, irregular, no murmur  GI: Ostomy in place with loose stool.  Bowel sounds appreciated.  Skin/Integumen: WWP, no rash. No edema  Neuro: Awake.  Answers appropriately.  Follows commands.    Medications     dexmedetomidine Stopped (07/10/23 1255)     dextrose 65 mL/hr at 07/14/23 0800     lactated ringers Stopped (06/28/23 0011)     - MEDICATION INSTRUCTIONS -         amiodarone  200 mg Oral or Feeding Tube Daily     [Held by provider] apixaban ANTICOAGULANT  5 mg Per Feeding Tube  BID     [Held by provider] atorvastatin  20 mg Per Feeding Tube Daily     budesonide  0.5 mg Nebulization BID     diltiazem  5 mg Intravenous Q4H     enoxaparin ANTICOAGULANT  40 mg Subcutaneous Q24H     ipratropium  0.5 mg Nebulization 4x daily     [Held by provider] lactulose  20 g Oral BID     levalbuterol  0.63 mg Nebulization 4x Daily     [Held by provider] levothyroxine  112 mcg Per Feeding Tube Daily     levothyroxine  88 mcg Intravenous Daily     magnesium oxide  400 mg Oral Q4H     magnesium oxide  400 mg Oral or Feeding Tube Daily     menthol   Transdermal Q8H     [Held by provider] multivitamins w/minerals  15 mL Per Feeding Tube Daily     nicotine  1 patch Transdermal Daily     nicotine   Transdermal Q8H     [Held by provider] oxyCODONE  5 mg Per Feeding Tube Q4H     [Held by provider] pantoprazole  40 mg Per Feeding Tube QAM AC     pantoprazole  40 mg Intravenous Daily with breakfast     [Held by provider] PARoxetine  20 mg Per Feeding Tube Daily     potassium & sodium phosphates  1 packet Oral or Feeding Tube Q4H     [Held by provider] protein modular  1 packet Per Feeding Tube Daily     [Held by provider] senna-docusate  2 tablet Oral BID     sodium chloride (PF)  10-40 mL Intracatheter Q8H     sodium chloride (PF)  3 mL Intracatheter Q8H       Data   Recent Labs   Lab 07/14/23  0814 07/14/23  0456 07/13/23  2347 07/13/23  1613 07/13/23  1336 07/13/23  0759 07/13/23  0542 07/12/23  0734 07/12/23  0443 07/09/23  1237 07/09/23  0443   WBC  --  5.1  --   --   --   --  6.1  --  6.9   < > 7.1   HGB  --  8.2*  --   --   --   --  7.9*  --  8.6*   < > 7.6*   MCV  --  99  --   --   --   --  101*  --  100   < > 100   PLT  --  185  --   --   --   --  181  --  181   < > 148*   NA  --  134*  --   --   --   --  137  --  138   < > 140   POTASSIUM  --  3.2*  --   --  3.6  --  3.1*   < > 3.1*   < > 3.8   CHLORIDE  --  100  --   --   --   --  102  --  101   < > 105   CO2  --  25  --   --   --   --  27  --  27   < > 29    BUN  --  5.8*  --   --   --   --  9.9  --  12.8   < > 21.1   CR  --  0.43*  --   --   --   --  0.46*  --  0.46*   < > 0.41*   ANIONGAP  --  9  --   --   --   --  8  --  10   < > 6*   EDIN  --  7.8*  --   --   --   --  7.7*  --  8.3*   < > 8.4*   * 139* 125*   < >  --    < > 158*   < > 94   < > 139*   ALBUMIN  --   --   --   --   --   --   --   --  2.8*  --  2.9*   PROTTOTAL  --   --   --   --   --   --   --   --  5.6*  --  5.7*   BILITOTAL  --   --   --   --   --   --   --   --  0.5  --  0.3   ALKPHOS  --   --   --   --   --   --   --   --  82  --  63   ALT  --   --   --   --   --   --   --   --  53*  --  78*   AST  --   --   --   --   --   --   --   --  28  --  25    < > = values in this interval not displayed.       No results found for this or any previous visit (from the past 24 hour(s)).

## 2023-07-14 NOTE — PROGRESS NOTES
CLINICAL NUTRITION SERVICES - BRIEF NOTE     Nutrition Prescription    RECOMMENDATIONS FOR MDs/PROVIDERS TO ORDER:  None    Malnutrition Status:    Patient does not meet criteria for malnutrition    Recommendations already ordered by Registered Dietitian (RD):  Initiated TF with Promote @ 25m/hr and if tolerated increase by 20ml every 8 hours to goal of 65ml/hr x 22 hrs (hold for 1 hr before and after levothyroxine)    At goal, Promote will provide 1430kcals, 89g protein, 186g CHO, 37g fat, 1200ml free water, 180ml water from flushes for a total of 1380ml fluid daily.     Prosource TF20 brings totals to 1510kcals and 109g protein daily    Future/Additional Recommendations:  Adjust flushes pending hydration status       EVALUATION OF THE PROGRESS TOWARD GOALS   Diet: NPO  Nutrition Support: Pt w/ NJ tube placed 6/28/23. Pt's TF has been on hold dt large bowel obstruction s/p repair  Intake: Poor    Lactated ringers @ 10ml/hr providing 240ml fluid daily     NEW FINDINGS   Per rounds ok to resume TF. Electrolytes getting replaced.     07/14/23 0400 69.3 kg (152 lb 12.5 oz)   07/13/23 0600 68.5 kg (151 lb 0.2 oz)   07/11/23 0500 70.6 kg (155 lb 10.3 oz)   07/10/23 0600 69.6 kg (153 lb 7 oz)   07/09/23 0552 69.9 kg (154 lb 1.6 oz)   07/08/23 0445 68.6 kg (151 lb 3.8 oz)   07/07/23 0600 67 kg (147 lb 11.3 oz)   07/06/23 0300 68.1 kg (150 lb 2.1 oz)   07/05/23 0615 68.3 kg (150 lb 9.2 oz)   07/04/23 0630 72.3 kg (159 lb 6.3 oz)   07/03/23 0430 76.1 kg (167 lb 12.3 oz)   07/02/23 0529 80.6 kg (177 lb 11.1 oz)     07/01/23 0448 79.1 kg (174 lb 6.1 oz)   06/30/23 0600 79.1 kg (174 lb 6.1 oz)   06/29/23 0430 78.2 kg (172 lb 6.4 oz)   06/28/23 0556 75.5 kg (166 lb 7.2 oz)   06/27/23 0600 75.3 kg (166 lb 0.1 oz)   06/26/23 0615 76.4 kg (168 lb 6.9 oz)   06/25/23 0530 77 kg (169 lb 12.1 oz)   06/24/23 0600 73.7 kg (162 lb 7.7 oz)   06/23/23 0345 67.7 kg (149 lb 4 oz)   06/21/23 0304 64.5 kg (142 lb 3.2 oz)     Net fluid down 9.5L  "(14.7lbs) since 6/30/23 per I/Os    ANTHROPOMETRICS  Height: 160 cm (5' 3\")  Most Recent Weight: 79.1 kg (174 lb 6.1 oz)    IBW: 52.4 kg  BMI: Obesity Grade I BMI 30-34.9  Weight History:   Wt Readings from Last 30 Encounters:   07/14/23 69.3 kg (152 lb 12.5 oz)   04/01/23 68.8 kg (151 lb 9.6 oz)   02/11/23 60 kg (132 lb 4.4 oz)   12/02/22 61.3 kg (135 lb 2.3 oz)   09/28/22 63.1 kg (139 lb 1.6 oz)   08/29/22 61.7 kg (136 lb 1.6 oz)   05/17/22 61.2 kg (134 lb 14.4 oz)   04/02/22 58.9 kg (129 lb 12.8 oz)   02/21/22 59 kg (130 lb)   02/04/22 59 kg (130 lb)   02/04/22 59 kg (130 lb)   01/11/22 60.7 kg (133 lb 14.4 oz)       PHYSICAL FINDINGS  See malnutrition section below.  Scattered bruising  Arm skin tears   Surgical incision    GI CONCERNS  Abdominal Pain, LBM 7/13/23    LABS  Reviewed: BG , Na 134, K 3.2, UN 5.8, Cr 0.43, Mg 1.6, Phos 2.4, hgb 8.2, hematocrit 25.2, rbc 2.54, RDW 16.5    MEDICATIONS  Reviewed: levothyroxine, mag-ox, protonix, neutra-phos, klor-con, prosource TF, D5 infusion, D10 infusion    ASSESSED NUTRITION NEEDS  Dose weight: 64.5 kg  Estimated Energy Needs: 5859-9769 kcals/day (20-25 kcal/kg)  Justification: Vented  Estimated Protein Needs:  grams protein/day (1.2 - 2 grams of pro/kg)  Justification: Hypercatabolism with critical illness  Estimated Fluid Needs: 1925 mL/day (30 mL/kg)   Justification: Maintenance  "

## 2023-07-15 ENCOUNTER — APPOINTMENT (OUTPATIENT)
Dept: OCCUPATIONAL THERAPY | Facility: CLINIC | Age: 58
End: 2023-07-15
Payer: COMMERCIAL

## 2023-07-15 ENCOUNTER — APPOINTMENT (OUTPATIENT)
Dept: PHYSICAL THERAPY | Facility: CLINIC | Age: 58
End: 2023-07-15
Payer: COMMERCIAL

## 2023-07-15 LAB
ANION GAP SERPL CALCULATED.3IONS-SCNC: 6 MMOL/L (ref 7–15)
BUN SERPL-MCNC: 5.9 MG/DL (ref 8–23)
CALCIUM SERPL-MCNC: 8 MG/DL (ref 8.6–10)
CHLORIDE SERPL-SCNC: 104 MMOL/L (ref 98–107)
CREAT SERPL-MCNC: 0.48 MG/DL (ref 0.51–0.95)
DEPRECATED HCO3 PLAS-SCNC: 25 MMOL/L (ref 22–29)
ERYTHROCYTE [DISTWIDTH] IN BLOOD BY AUTOMATED COUNT: 16.1 % (ref 10–15)
GFR SERPL CREATININE-BSD FRML MDRD: >90 ML/MIN/1.73M2
GLUCOSE BLDC GLUCOMTR-MCNC: 108 MG/DL (ref 70–99)
GLUCOSE BLDC GLUCOMTR-MCNC: 110 MG/DL (ref 70–99)
GLUCOSE BLDC GLUCOMTR-MCNC: 115 MG/DL (ref 70–99)
GLUCOSE BLDC GLUCOMTR-MCNC: 116 MG/DL (ref 70–99)
GLUCOSE BLDC GLUCOMTR-MCNC: 94 MG/DL (ref 70–99)
GLUCOSE BLDC GLUCOMTR-MCNC: 94 MG/DL (ref 70–99)
GLUCOSE BLDC GLUCOMTR-MCNC: 99 MG/DL (ref 70–99)
GLUCOSE SERPL-MCNC: 111 MG/DL (ref 70–99)
HCT VFR BLD AUTO: 26.8 % (ref 35–47)
HGB BLD-MCNC: 8.7 G/DL (ref 11.7–15.7)
MAGNESIUM SERPL-MCNC: 1.7 MG/DL (ref 1.7–2.3)
MCH RBC QN AUTO: 31.9 PG (ref 26.5–33)
MCHC RBC AUTO-ENTMCNC: 32.5 G/DL (ref 31.5–36.5)
MCV RBC AUTO: 98 FL (ref 78–100)
PHOSPHATE SERPL-MCNC: 3 MG/DL (ref 2.5–4.5)
PLATELET # BLD AUTO: 201 10E3/UL (ref 150–450)
POTASSIUM SERPL-SCNC: 3.8 MMOL/L (ref 3.4–5.3)
RBC # BLD AUTO: 2.73 10E6/UL (ref 3.8–5.2)
SODIUM SERPL-SCNC: 135 MMOL/L (ref 136–145)
WBC # BLD AUTO: 4.8 10E3/UL (ref 4–11)

## 2023-07-15 PROCEDURE — 250N000013 HC RX MED GY IP 250 OP 250 PS 637: Performed by: HOSPITALIST

## 2023-07-15 PROCEDURE — 999N000215 HC STATISTIC HFNC ADULT NON-CPAP

## 2023-07-15 PROCEDURE — 97530 THERAPEUTIC ACTIVITIES: CPT | Mod: GO

## 2023-07-15 PROCEDURE — 250N000009 HC RX 250: Performed by: INTERNAL MEDICINE

## 2023-07-15 PROCEDURE — 250N000013 HC RX MED GY IP 250 OP 250 PS 637: Performed by: INTERNAL MEDICINE

## 2023-07-15 PROCEDURE — 250N000011 HC RX IP 250 OP 636: Mod: JZ | Performed by: HOSPITALIST

## 2023-07-15 PROCEDURE — 250N000011 HC RX IP 250 OP 636: Performed by: INTERNAL MEDICINE

## 2023-07-15 PROCEDURE — 250N000013 HC RX MED GY IP 250 OP 250 PS 637: Performed by: SURGERY

## 2023-07-15 PROCEDURE — 94640 AIRWAY INHALATION TREATMENT: CPT

## 2023-07-15 PROCEDURE — 999N000157 HC STATISTIC RCP TIME EA 10 MIN

## 2023-07-15 PROCEDURE — 84100 ASSAY OF PHOSPHORUS: CPT | Performed by: HOSPITALIST

## 2023-07-15 PROCEDURE — 94640 AIRWAY INHALATION TREATMENT: CPT | Mod: 76

## 2023-07-15 PROCEDURE — 83735 ASSAY OF MAGNESIUM: CPT | Performed by: HOSPITALIST

## 2023-07-15 PROCEDURE — 80048 BASIC METABOLIC PNL TOTAL CA: CPT | Performed by: INTERNAL MEDICINE

## 2023-07-15 PROCEDURE — 97110 THERAPEUTIC EXERCISES: CPT | Mod: GO

## 2023-07-15 PROCEDURE — 200N000001 HC R&B ICU

## 2023-07-15 PROCEDURE — 250N000011 HC RX IP 250 OP 636: Mod: JZ | Performed by: INTERNAL MEDICINE

## 2023-07-15 PROCEDURE — 97530 THERAPEUTIC ACTIVITIES: CPT | Mod: GP | Performed by: PHYSICAL THERAPIST

## 2023-07-15 PROCEDURE — 85027 COMPLETE CBC AUTOMATED: CPT | Performed by: INTERNAL MEDICINE

## 2023-07-15 PROCEDURE — 99233 SBSQ HOSP IP/OBS HIGH 50: CPT | Performed by: HOSPITALIST

## 2023-07-15 RX ORDER — FENTANYL CITRATE 50 UG/ML
50 INJECTION, SOLUTION INTRAMUSCULAR; INTRAVENOUS
Status: COMPLETED | OUTPATIENT
Start: 2023-07-15 | End: 2023-07-15

## 2023-07-15 RX ORDER — POTASSIUM CHLORIDE 20MEQ/15ML
20 LIQUID (ML) ORAL ONCE
Status: COMPLETED | OUTPATIENT
Start: 2023-07-15 | End: 2023-07-15

## 2023-07-15 RX ORDER — DILTIAZEM HYDROCHLORIDE 30 MG/1
60 TABLET, FILM COATED ORAL EVERY 6 HOURS SCHEDULED
Status: COMPLETED | OUTPATIENT
Start: 2023-07-15 | End: 2023-07-23

## 2023-07-15 RX ORDER — MAGNESIUM SULFATE HEPTAHYDRATE 40 MG/ML
2 INJECTION, SOLUTION INTRAVENOUS ONCE
Status: COMPLETED | OUTPATIENT
Start: 2023-07-15 | End: 2023-07-15

## 2023-07-15 RX ORDER — FENTANYL CITRATE 50 UG/ML
25 INJECTION, SOLUTION INTRAMUSCULAR; INTRAVENOUS EVERY 5 MIN PRN
Status: ACTIVE | OUTPATIENT
Start: 2023-07-15 | End: 2023-07-16

## 2023-07-15 RX ADMIN — DILTIAZEM HYDROCHLORIDE 60 MG: 30 TABLET, FILM COATED ORAL at 23:40

## 2023-07-15 RX ADMIN — ATORVASTATIN CALCIUM 20 MG: 20 TABLET, FILM COATED ORAL at 09:23

## 2023-07-15 RX ADMIN — LEVALBUTEROL HYDROCHLORIDE 0.63 MG: 0.63 SOLUTION RESPIRATORY (INHALATION) at 08:31

## 2023-07-15 RX ADMIN — MAGNESIUM SULFATE HEPTAHYDRATE 2 G: 2 INJECTION, SOLUTION INTRAVENOUS at 06:47

## 2023-07-15 RX ADMIN — LEVALBUTEROL HYDROCHLORIDE 0.63 MG: 0.63 SOLUTION RESPIRATORY (INHALATION) at 19:46

## 2023-07-15 RX ADMIN — MIDAZOLAM 1 MG: 1 INJECTION INTRAMUSCULAR; INTRAVENOUS at 11:14

## 2023-07-15 RX ADMIN — BUDESONIDE 0.5 MG: 0.5 INHALANT ORAL at 19:46

## 2023-07-15 RX ADMIN — IPRATROPIUM BROMIDE 0.5 MG: 0.5 SOLUTION RESPIRATORY (INHALATION) at 19:46

## 2023-07-15 RX ADMIN — HYDROXYZINE HYDROCHLORIDE 25 MG: 10 SOLUTION ORAL at 01:19

## 2023-07-15 RX ADMIN — DILTIAZEM HYDROCHLORIDE 5 MG: 5 INJECTION, SOLUTION INTRAVENOUS at 09:23

## 2023-07-15 RX ADMIN — DILTIAZEM HYDROCHLORIDE 60 MG: 30 TABLET, FILM COATED ORAL at 18:38

## 2023-07-15 RX ADMIN — IPRATROPIUM BROMIDE 0.5 MG: 0.5 SOLUTION RESPIRATORY (INHALATION) at 15:15

## 2023-07-15 RX ADMIN — LEVALBUTEROL HYDROCHLORIDE 0.63 MG: 0.63 SOLUTION RESPIRATORY (INHALATION) at 12:23

## 2023-07-15 RX ADMIN — PAROXETINE 20 MG: 10 SUSPENSION ORAL at 09:25

## 2023-07-15 RX ADMIN — LEVOTHYROXINE SODIUM 112 MCG: 0.11 TABLET ORAL at 09:23

## 2023-07-15 RX ADMIN — BUDESONIDE 0.5 MG: 0.5 INHALANT ORAL at 08:31

## 2023-07-15 RX ADMIN — DILTIAZEM HYDROCHLORIDE 5 MG: 5 INJECTION, SOLUTION INTRAVENOUS at 01:19

## 2023-07-15 RX ADMIN — NICOTINE 1 PATCH: 14 PATCH, EXTENDED RELEASE TRANSDERMAL at 09:25

## 2023-07-15 RX ADMIN — MIDAZOLAM 1 MG: 1 INJECTION INTRAMUSCULAR; INTRAVENOUS at 11:32

## 2023-07-15 RX ADMIN — DILTIAZEM HYDROCHLORIDE 5 MG: 5 INJECTION, SOLUTION INTRAVENOUS at 05:59

## 2023-07-15 RX ADMIN — AMIODARONE HYDROCHLORIDE 200 MG: 200 TABLET ORAL at 09:23

## 2023-07-15 RX ADMIN — ENOXAPARIN SODIUM 40 MG: 40 INJECTION SUBCUTANEOUS at 21:00

## 2023-07-15 RX ADMIN — LEVALBUTEROL HYDROCHLORIDE 0.63 MG: 0.63 SOLUTION RESPIRATORY (INHALATION) at 15:15

## 2023-07-15 RX ADMIN — IPRATROPIUM BROMIDE 0.5 MG: 0.5 SOLUTION RESPIRATORY (INHALATION) at 08:31

## 2023-07-15 RX ADMIN — IPRATROPIUM BROMIDE 0.5 MG: 0.5 SOLUTION RESPIRATORY (INHALATION) at 12:23

## 2023-07-15 RX ADMIN — MAGNESIUM OXIDE TAB 400 MG (241.3 MG ELEMENTAL MG) 400 MG: 400 (241.3 MG) TAB at 09:23

## 2023-07-15 RX ADMIN — ACETAMINOPHEN 650 MG: 325 SOLUTION ORAL at 01:19

## 2023-07-15 RX ADMIN — DILTIAZEM HYDROCHLORIDE 60 MG: 30 TABLET, FILM COATED ORAL at 12:51

## 2023-07-15 RX ADMIN — POTASSIUM CHLORIDE 20 MEQ: 20 SOLUTION ORAL at 06:47

## 2023-07-15 RX ADMIN — Medication 15 ML: at 09:22

## 2023-07-15 ASSESSMENT — ACTIVITIES OF DAILY LIVING (ADL)
ADLS_ACUITY_SCORE: 36

## 2023-07-15 NOTE — PROGRESS NOTES
Patient continues on HFTD with 25L and 21% oxygen. Will continue to monitor.    Venous Blood Gas  No lab results found in last 7 days.     EXAM: XR CHEST PORT 1 VIEW  LOCATION: [facility]  DATE: 7/11/2023     INDICATION: NG inadvertently went into the lungs.  COMPARISON: Chest radiograph 07/10/2023.                                                                       IMPRESSION:     Unchanged tracheostomy tube with tip at the thoracic inlet. Feeding tube descends along the course of the esophagus with tip below the diaphragm and out of the field-of-view. Unchanged right PICC with tip in the lower SVC.     Lung volumes remain low with slight elevation of the left hemidiaphragm and adjacent left basilar atelectasis. Multiple small opacities at the left apex are unchanged and could either represent calcifications or aspirated barium. No new airspace   opacities, pleural effusions, or pneumothorax.     Stable, nonenlarged cardiac silhouette.    Kody Medina, RT on 7/15/2023 at 5:02 AM

## 2023-07-15 NOTE — PLAN OF CARE
ICU End of Shift Summary.  For vital signs and complete assessments, please see documentation flowsheets.      Pertinent assessments: Pt was able to get a few long stretches of sleep tonight. Able to use soft touch call light appropriately. C/o pain of 4/10 in back. States her belly is tender with palpation but declines any need for pain medication for this. Given Tylenol and Vistaril x 1 with patient able to sleep after and she denied pain the rest of the shift. T max of 99.2. LSs diminished with coarseness scattered throughout at times. Suctioning frequently for small to moderate amounts of cloudy, thick sputum. Tele SR. Hines with 1100 mL out. Continues to have loose stools via rectum. Also had 375 mL of loose stool from ostomy bag. Tolerating tube feeds with no nausea or increased abdominal pain.  Major Shift Events: Potassium replaced x 2 and Magnesium replaced x 1 with recheck in AM.         - Updated patient's daughter, Danica, around 2330 tonight.  Plan (Upcoming Events): Continue to increase tube feed and monitor GI system closely for any further complications. CRS following. Continue to wean oxygen via trach as able. PT/OT and SP following.  Discharge/Transfer Needs: TBD. Continue ICU cares at this time.     Bedside Shift Report Completed   Bedside Safety Check Completed    Goal Outcome Evaluation:      Plan of Care Reviewed With: patient    Overall Patient Progress: improvingOverall Patient Progress: improving

## 2023-07-15 NOTE — PROGRESS NOTES
Respiratory Therapy Note        Pt remains on HFTD 25 lpm at 21% for humidity.  Trach was changed to a Cuffless Shiley #4.  PMV was placed after trach change and she has been tolerating it exceedingly well.  Strong, clear voice with out increase in WOB or SOB.      July 15, 2023 5:08 PM  Ron Hernandez, RT

## 2023-07-15 NOTE — PROGRESS NOTES
8/29/2022    Yg April 1967 is a 62y.o. year old female new patient,   here for evaluation of the following chief complaint(s):  Chief Complaint   Patient presents with    Establish Care    Heart Problem           ASSESSMENT/PLAN:  Below is the assessment and plan developed based on review of pertinent history, physical exam, labs, studies, and medications. 1. History of MI (myocardial infarction)  Assessment & Plan:   STEMI s/p 2 ROHINI 8/2022  Will continue under the care of Dr Burton Chaidez initiated, limited by low BP  Encouraged her to have BP cuff at home  Discussed plan for cardiac rehab and diet suggestions  2. Acute HFrEF (heart failure with reduced ejection fraction) (HCC)  Assessment & Plan:   EF 30% recently, some improvement anticipated with recovery and med management, will continue close cardiology follow up  3. Hyponatremia  Assessment & Plan: Will have follow up and labs per Dr Flo Khanna they forward results to our office  4. Nonintractable epilepsy without status epilepticus, unspecified epilepsy type [facility])  Assessment & Plan:  Has been seizure free 20+ Years  Under the care of Dr Munoz Josue  Will continue working with him on issue of trileptal titration as it was thought to contribute to hyponatremia       Follow-up and Dispositions    Return in about 4 months (around 12/29/2022) for annual physical, or sooner as needed. SUBJECTIVE/OBJECTIVE:  Here to establish care and follow up after recent hospitalization last week at [facility] for MI. S/p 2 stents. Visit complicated by heart failure diagnosis and hyponatremia. She reports feeling well overall since her discharge. She is coordinating appropriate follow ups with cardiology, nephrology, cardiac rehab       Review of Systems   Constitutional:  Positive for malaise/fatigue. Negative for chills and fever. Respiratory:  Negative for cough and shortness of breath.     Cardiovascular:  Negative for chest Trach change / downsized at bedside to Shiley [id number] (6.5mm inner diameter of outer cannula) uncuffed tube.     Tolerated well, tract looks good. Bronchoscopic confirmation of tracheal position. Voice is strong after change.     Likely will not need trach for very long. This is the smallest uncuffed tube so assuming she is progressing well, the next step is decannulation.     I spent some time talking with her about her breathing, she has a pulmonology clinic she is happy with. She had somewhat precipitous onset/worsening of obstructive lung disease after a tooth extraction (Dec 2021) led to mandibular abscess (Jan 2022) and then pharyngeal space infection (Feb 2022) with multiple surgical drainage procedures. She had a long history of cigarette smoking but quit during these dental/oropharyngeal issues and has remained quit. She reports no problems with breathing prior to the oropharyngeal infection. She has worked in a hair salon and definitely noticed symptomatic difficulties when in the salon of late so she has been working almost exclusively at home.     Today I advised her to completely avoid the salon environment and pay attention to indoor and outdoor air quality.     She should continue to follow closely with her pulmonary clinic.    pain, palpitations and leg swelling. Gastrointestinal:  Negative for abdominal pain. Skin:  Negative for rash. Neurological:  Negative for dizziness and headaches. Physical Exam  Constitutional:       General: She is not in acute distress. Appearance: Normal appearance. She is not ill-appearing. HENT:      Head: Normocephalic and atraumatic. Eyes:      Conjunctiva/sclera: Conjunctivae normal.   Cardiovascular:      Rate and Rhythm: Normal rate and regular rhythm. Heart sounds: No murmur heard. Pulmonary:      Effort: Pulmonary effort is normal.      Breath sounds: Normal breath sounds. No wheezing. Musculoskeletal:      Right lower leg: No edema. Left lower leg: No edema. Skin:     General: Skin is warm and dry. Neurological:      General: No focal deficit present. Mental Status: She is alert and oriented to person, place, and time. Mental status is at baseline. Psychiatric:         Mood and Affect: Mood normal.         Thought Content: Thought content normal.         Judgment: Judgment normal.        Vitals:    08/29/22 1419 08/29/22 1456   BP: 96/68 98/64   Pulse: 95    Resp: 16    Temp: 97.1 °F (36.2 °C)    TempSrc: Temporal    SpO2: 96%    Weight: 144 lb 6.4 oz (65.5 kg)    Height: 5' 6.61\" (1.692 m)         The following sections were reviewed & updated as appropriate: Problem List, Allergies, PMH, PSH, FH, and SH. Patient Active Problem List   Diagnosis Code    History of MI (myocardial infarction) I25.2    Acute HFrEF (heart failure with reduced ejection fraction) ([facility]) I50.21    Hyponatremia E87.1    Epilepsy ([facility]) G40.909        Current Outpatient Medications   Medication Sig Dispense Refill    PHENobarbitaL (LUMINAL) 64.8 mg tablet TAKE 5 TABLETS BY MOUTH EVERY DAY      OXcarbazepine (TRILEPTAL) 300 mg tablet Take 1 Tablet by mouth nightly. 30 Tablet 0    aspirin 81 mg chewable tablet Take 1 Tablet by mouth daily.  30 Tablet 11    atorvastatin (LIPITOR) 80 mg tablet Take 1 Tablet by mouth daily. 30 Tablet 3    clopidogreL (PLAVIX) 75 mg tab Take 1 Tablet by mouth daily. 30 Tablet 11    empagliflozin (JARDIANCE) 10 mg tablet Take 1 Tablet by mouth daily. 30 Tablet 3    levETIRAcetam (KEPPRA) 500 mg tablet Take 1 Tablet by mouth two (2) times a day. 60 Tablet 0    metoprolol succinate (TOPROL-XL) 25 mg XL tablet Take 1 Tablet by mouth daily. 30 Tablet 3    losartan (COZAAR) 25 mg tablet Take 0.5 Tablets by mouth daily. 30 Tablet 3       Dilantin [phenytoin sodium extended]    Social History     Occupational History    Not on file   Tobacco Use    Smoking status: Never    Smokeless tobacco: Never   Vaping Use    Vaping Use: Never used   Substance and Sexual Activity    Alcohol use: Never    Drug use: Never    Sexual activity: Not Currently        On this date 08/29/2022 I have spent 42 minutes reviewing previous notes, test results and face to face with the patient discussing the diagnosis and importance of compliance with the treatment plan as well as documenting on the day of the visit. An electronic signature was used to authenticate this note.   -- Timothy Nava MD

## 2023-07-15 NOTE — PROGRESS NOTES
"Colon & Rectal Surgery Progress Note             Interval History:   Postop Day #: 3  Stoma functioning. Denies nausea. Tolerating TFs at goal.  + BMs as well..                Medications:   I have reviewed this patient's current medications               Physical Exam:   Blood pressure 125/71, pulse 75, temperature 99.2  F (37.3  C), temperature source Oral, resp. rate 26, height 1.6 m (5' 3\"), weight 69.6 kg (153 lb 7 oz), SpO2 95 %.      Intake/Output Summary (Last 24 hours) at 7/15/2023 1057  Last data filed at 7/15/2023 0900  Gross per 24 hour   Intake 1665 ml   Output 2735 ml   Net -1070 ml       GEN:  alert  ABD:  Soft, stoma red, productive, RRC bridge in place         Data:        Lab Results   Component Value Date     07/14/2023     02/02/2006    Lab Results   Component Value Date    CHLORIDE 100 07/14/2023    CHLORIDE 99 06/21/2023    CHLORIDE 103 05/17/2022    CHLORIDE 109 02/02/2006    Lab Results   Component Value Date    BUN 5.8 07/14/2023    BUN 7 06/21/2023    BUN 17 05/17/2022    BUN <2 02/02/2006      Lab Results   Component Value Date    POTASSIUM 3.2 07/14/2023    POTASSIUM 5.1 06/21/2023    POTASSIUM 4.5 05/17/2022    POTASSIUM 3.4 02/02/2006    Lab Results   Component Value Date    CO2 25 07/14/2023    CO2 29 05/17/2022    CO2 17 02/02/2006    Lab Results   Component Value Date    CR 0.43 07/14/2023    CR 0.46 07/13/2023    CR 0.80 02/02/2006    CR 0.90 02/01/2006        Lab Results   Component Value Date    HGB 8.7 (L) 07/15/2023    HGB 8.2 (L) 07/14/2023     Lab Results   Component Value Date     07/15/2023     07/14/2023     Lab Results   Component Value Date    WBC 4.8 07/15/2023    WBC 5.1 07/14/2023            Assessment and Plan:   Doing well from CRS point of view.  Stoma care and teaching  Supportive care per ICU   Likely ok to resume AC with eliquis tomorrow.     Joellen Colvin MD, FACS  Colorectal Surgery     [facility]  [address] " [place] [address]  T: [phone]  F: [phone]

## 2023-07-15 NOTE — PLAN OF CARE
Patient Afebrile, A &O x 4.  Cardiac: Tele SR. BP controled. Switched to  oral diltiazem.   Resp:  LS clear to coarse when in need of suctioning. Changed out trach today to smaller size 4 and tolerated well. Gave 2mg of Versed for procedure. Patient on speaking valve all day. Suctioning slowed down, better at clearing herself.  UO: Catheter removed today at 1300. First void by 1500.   UI:  NG in place. TF at goal. Active bowels  Skin: see flow sheet  Lines: PICC, PIV    Patient had family and friends stop by and was in better spirits. Lifted to commode. Worked with PT and OT. Waiting on placement.

## 2023-07-15 NOTE — PROGRESS NOTES
Notified provider about indwelling el catheter discussed removal or continued need.    Did provider choose to remove indwelling el catheter? YES    Provider's el indication for keeping indwelling el catheter: Indication for continued use: Retention    Is there an order for indwelling el catheter? NO    *If there is a plan to keep el catheter in place at discharge daily notification with provider is not necessary, but please add a notation in the treatment team sticky note that the patient will be discharging with the catheter.

## 2023-07-15 NOTE — PROGRESS NOTES
[facility]    Hospitalist Progress Note      Assessment & Plan   Lara Melendez is a 57 year old female with a history of COPD on prn home oxygen 2-3 L/nc, tobacco dependence, htn/hlp, PAF chronically on apixaban, anxiety, hypothyroidism, psoriasis admitted on 6/20/2023 with SOB and wheezing.     Patient has had a protracted admission here at [facility].  Her symptoms reportedly started a few days prior to admission and she called her pulmonologist who initiated empiric azithromycin and prednisone.  Symptoms continued to worsen so EMS was called and she was brought to the ER.  Her respiratory status continued to decline and she required intubation the day after admission.  She has been treated for community-acquired pneumonia along with COPD exacerbation with steroids and antibiotics.  While on the ventilator, patient had significant bronchospasm and high airway pressures requiring deep sedation and paralysis.  [facility] was actually consulted for possibility of ECMO but she was not deemed an appropriate candidate.  She was also found to have a large pneumothorax on 6/26 with chest tube placed by IR which has subsequently been removed (pulled on 7/10).  She has also had recurrent A-fib with RVR requiring both cardioversion and diltiazem with amiodarone.  She was continued on treatment with steroids, nebs, antibiotics and aggressive diuresis.  Paralytics were able to be weaned off but she did require tracheostomy for long-term ventilator weaning.     Patient had worsening abdominal distention on 7/11.  She had a CT scan done that was concerning for large bowel obstruction.  Colorectal surgery was consulted and she underwent surgical repair in the early a.m. of 7/12.     Plan for today:   -Remove el catheter  -Trach exchanged  -Continue therapies. Continue tube feeds     #Acute on chronic hypoxic and hypercapnic respiratory failure. COPD with acute exacerbation chronically on 2-3 L/nc prn. Left-sided  ptx. PNA with sputum + for klebsiella oxytoca. Pulmonary edema 2/2 fluid resuscitation for sepsis status post tracheotomy  Complicated respiratory course-initially intubated due to progressive decline in respiratory function after admission-high airway pressures from severe bronchospasm required deep sedation including paralysis with vec.  She was also treated with 24 hours of continuous albuterol neb.  Lower respiratory track positive for Klebsiella as above along with rhinovirus.  -Patient completed steroid course.  She also completed course of IV antibiotics.  -Chest tube placed by IR on 6/26 for pneumothorax, subsequently removed on 7/10.  -Continue on DuoNebs.  Appreciate pulmonary consultation.  -IV Lasix was given prn for volume overload.   -Will need placement for continued recovery. Now looking at acute rehab. Needs to recover from surgical perspective first.  -Speech is working with the patient.  Trach down-sized on 7/15 with better comfort and able to talk more.   -Appreciate intensivist assistance  -Droplet precautions     #Ileus. Large bowel obstruction s/p diverting colostomy: First tried bowel meds without improvement.  Patient had worsening abdominal distention and pain.  CT abdomen pelvis done on 7/12 showed colonic dilation with abrupt termination at the mid sigmoid colon with suggestion of pneumatosis.  Colorectal surgery was consulted.  Underwent operative repair early a.m. of 7/12.  Seem to tolerate well.  Appreciate colorectal surgery for postoperative cares.    -Starting on tube feeds again on 7/14.  -We have been holding some nonessential medications per surgery team recommendations initially.  Reinitiation of tube feeds, these meds have now been resumed.  Tolerating feeds.     #Paroxysmal AF/SVT: Pta on diltiazem and apixaban (BB being avoided due to severe COPD). Required DCCV x 2 during this hospitalization.    -Continue on oral amiodarone along with diltiazem.  Holding DOAC with recent  surgery.  Resume once able per colorectal.  Switch to Lovenox for prophylaxis.     #Sepsis. Elevated lactate: Her hospital course has been peppered with episodic sepsis with leukocytosis/tachycardia/fever/respiratory failure etc  -resolved  -elevated lactate due to sepsis, and nebs      #Hypotension: Did require transient phenylephrine support due to deep sedation. Now resolved.      #CAUTI.  Pseudomonas UTI: El cath placed for critical illness, UA 6/28 with inflammatory cells with large blood. UCx with  K with pseudomonas  -rec'd 2 days of liberty followed by 4 days of cefepime, abx completed 7/6  -We will keep El in place for now given postoperative status.  Likely remove for voiding trial in the coming days.     #Hyperkalemia. Hypokalemia: Potassium bola to 5.5 and persisted in that range for a day or 2.  She received several doses of Lokelma and potassium level has now normalized and actually downtrending.  Unclear etiology.  Renal function is normal.  Her PTA losartan has been on hold.  Replacement as needed.  Monitor.     #Hypernatremia: Significant rise in sodium up to 160 with tube feed initiation.  Improved with high-dose free water via NG and D5, but remained at about 150.  Sodium is now normalized after dose of metolazone and Lasix drip.  Monitoring     #Hypertension pta on dilt and losartan. dilt back on and being titrated. Losartan discontinued in the setting of hyperkalemia     #Anemia  Drifting down during hospitalization and now at 6.8-otherwise hemodynamically stable. No clear bleeding noted.  Suspect critical illness/venipuncture mediated  -transfused single unit PRBC 7/8     DVT Prophylaxis: Lovenox postoperatively.  Holding DOAC per colorectal. Probably resume tomorrow.   Code Status: Full Code  Lines: Peripheral IVs, Remove el catheter for voiding trial on 7/15.   Dispo: Continue ICU cares     Discussed with intensivist.  Discussed with bedside nurse.     I did update daughter at  "bedside.     Tommie Ren MD    Interval History   No events.  Trach exchanged today.  Feels much better after trach exchange.  Very talkative.  Daughter at bedside.  Denies any worsening pain.  Endorses some \"stomach rumbling.\"  No evidence of bleeding.  No fevers.    -Data reviewed today: I reviewed all new labs and imaging results over the last 24 hours.    Physical Exam   Temp: 99.2  F (37.3  C) Temp src: Oral BP: 122/70 Pulse: 82   Resp: (!) 36 SpO2: 97 % O2 Device: Trach dome Oxygen Delivery: 25 LPM  Vitals:    07/13/23 0600 07/14/23 0400 07/15/23 0545   Weight: 68.5 kg (151 lb 0.2 oz) 69.3 kg (152 lb 12.5 oz) 69.6 kg (153 lb 7 oz)     Vital Signs with Ranges  Temp:  [98.2  F (36.8  C)-99.2  F (37.3  C)] 99.2  F (37.3  C)  Pulse:  [68-82] 82  Resp:  [15-36] 36  BP: ()/() 122/70  FiO2 (%):  [21 %] 21 %  SpO2:  [85 %-100 %] 97 %  I/O last 3 completed shifts:  In: 1690 [I.V.:570; NG/GT:560]  Out: 2810 [Urine:2100; Stool:710]    Constitutional: No acute distress.  Awakens to voice.  Answers appropriately.  HEENT: Normocephalic.  Trach in place.  Trach site appears clean.  Respiratory: Moving air bilaterally.    Prolonged expiratory phase.  Minimal wheeze.  Cardiovascular: Nontachycardic, irregular, no murmur  GI: Ostomy in place with loose stool.  Bowel sounds appreciated.  Skin/Integumen: WWP, no rash. No edema  Neuro: Awake.  Answers appropriately.  Moves all extremities.     Medications     dextrose Stopped (07/14/23 1400)     lactated ringers Stopped (06/28/23 0011)     - MEDICATION INSTRUCTIONS -         amiodarone  200 mg Oral or Feeding Tube Daily     [Held by provider] apixaban ANTICOAGULANT  5 mg Per Feeding Tube BID     atorvastatin  20 mg Per Feeding Tube Daily     budesonide  0.5 mg Nebulization BID     diltiazem  60 mg Oral Q6H ELZBIETA     enoxaparin ANTICOAGULANT  40 mg Subcutaneous Q24H     ipratropium  0.5 mg Nebulization 4x daily     levalbuterol  0.63 mg Nebulization 4x Daily     " levothyroxine  112 mcg Per Feeding Tube Daily     magnesium oxide  400 mg Oral or Feeding Tube Daily     menthol   Transdermal Q8H     multivitamins w/minerals  15 mL Per Feeding Tube Daily     nicotine  1 patch Transdermal Daily     nicotine   Transdermal Q8H     PARoxetine  20 mg Per Feeding Tube Daily     protein modular  1 packet Per Feeding Tube Daily     sodium chloride (PF)  10-40 mL Intracatheter Q8H     sodium chloride (PF)  3 mL Intracatheter Q8H       Data   Recent Labs   Lab 07/15/23  1155 07/15/23  0820 07/15/23  0542 07/15/23  0540 07/14/23  1620 07/14/23  1247 07/14/23  0814 07/14/23  0456 07/13/23  0759 07/13/23  0542 07/12/23  0734 07/12/23  0443 07/09/23  1237 07/09/23 0443   WBC  --   --   --  4.8  --   --   --  5.1  --  6.1  --  6.9   < > 7.1   HGB  --   --   --  8.7*  --   --   --  8.2*  --  7.9*  --  8.6*   < > 7.6*   MCV  --   --   --  98  --   --   --  99  --  101*  --  100   < > 100   PLT  --   --   --  201  --   --   --  185  --  181  --  181   < > 148*   NA  --   --   --  135*  --   --   --  134*  --  137  --  138   < > 140   POTASSIUM  --   --   --  3.8  --  3.7  --  3.2*   < > 3.1*   < > 3.1*   < > 3.8   CHLORIDE  --   --   --  104  --   --   --  100  --  102  --  101   < > 105   CO2  --   --   --  25  --   --   --  25  --  27  --  27   < > 29   BUN  --   --   --  5.9*  --   --   --  5.8*  --  9.9  --  12.8   < > 21.1   CR  --   --   --  0.48*  --   --   --  0.43*  --  0.46*  --  0.46*   < > 0.41*   ANIONGAP  --   --   --  6*  --   --   --  9  --  8  --  10   < > 6*   EDIN  --   --   --  8.0*  --   --   --  7.8*  --  7.7*  --  8.3*   < > 8.4*   GLC 94 116* 108* 111*   < >  --    < > 139*   < > 158*   < > 94   < > 139*   ALBUMIN  --   --   --   --   --   --   --   --   --   --   --  2.8*  --  2.9*   PROTTOTAL  --   --   --   --   --   --   --   --   --   --   --  5.6*  --  5.7*   BILITOTAL  --   --   --   --   --   --   --   --   --   --   --  0.5  --  0.3   ALKPHOS  --   --   --   --    --   --   --   --   --   --   --  82  --  63   ALT  --   --   --   --   --   --   --   --   --   --   --  53*  --  78*   AST  --   --   --   --   --   --   --   --   --   --   --  28  --  25    < > = values in this interval not displayed.       No results found for this or any previous visit (from the past 24 hour(s)).

## 2023-07-16 ENCOUNTER — APPOINTMENT (OUTPATIENT)
Dept: OCCUPATIONAL THERAPY | Facility: CLINIC | Age: 58
End: 2023-07-16
Payer: COMMERCIAL

## 2023-07-16 ENCOUNTER — APPOINTMENT (OUTPATIENT)
Dept: PHYSICAL THERAPY | Facility: CLINIC | Age: 58
End: 2023-07-16
Payer: COMMERCIAL

## 2023-07-16 LAB
ANION GAP SERPL CALCULATED.3IONS-SCNC: 8 MMOL/L (ref 7–15)
BUN SERPL-MCNC: 7.9 MG/DL (ref 8–23)
CALCIUM SERPL-MCNC: 7.9 MG/DL (ref 8.6–10)
CHLORIDE SERPL-SCNC: 104 MMOL/L (ref 98–107)
CREAT SERPL-MCNC: 0.41 MG/DL (ref 0.51–0.95)
DEPRECATED HCO3 PLAS-SCNC: 24 MMOL/L (ref 22–29)
ERYTHROCYTE [DISTWIDTH] IN BLOOD BY AUTOMATED COUNT: 16.2 % (ref 10–15)
GFR SERPL CREATININE-BSD FRML MDRD: >90 ML/MIN/1.73M2
GLUCOSE BLDC GLUCOMTR-MCNC: 109 MG/DL (ref 70–99)
GLUCOSE BLDC GLUCOMTR-MCNC: 110 MG/DL (ref 70–99)
GLUCOSE BLDC GLUCOMTR-MCNC: 112 MG/DL (ref 70–99)
GLUCOSE BLDC GLUCOMTR-MCNC: 117 MG/DL (ref 70–99)
GLUCOSE BLDC GLUCOMTR-MCNC: 119 MG/DL (ref 70–99)
GLUCOSE SERPL-MCNC: 126 MG/DL (ref 70–99)
HCT VFR BLD AUTO: 26.6 % (ref 35–47)
HGB BLD-MCNC: 8.3 G/DL (ref 11.7–15.7)
MAGNESIUM SERPL-MCNC: 1.8 MG/DL (ref 1.7–2.3)
MCH RBC QN AUTO: 31.2 PG (ref 26.5–33)
MCHC RBC AUTO-ENTMCNC: 31.2 G/DL (ref 31.5–36.5)
MCV RBC AUTO: 100 FL (ref 78–100)
PHOSPHATE SERPL-MCNC: 2.8 MG/DL (ref 2.5–4.5)
PLATELET # BLD AUTO: 212 10E3/UL (ref 150–450)
POTASSIUM SERPL-SCNC: 3.4 MMOL/L (ref 3.4–5.3)
POTASSIUM SERPL-SCNC: 3.6 MMOL/L (ref 3.4–5.3)
RBC # BLD AUTO: 2.66 10E6/UL (ref 3.8–5.2)
SODIUM SERPL-SCNC: 136 MMOL/L (ref 136–145)
WBC # BLD AUTO: 4.5 10E3/UL (ref 4–11)

## 2023-07-16 PROCEDURE — 97110 THERAPEUTIC EXERCISES: CPT | Mod: GP | Performed by: PHYSICAL THERAPIST

## 2023-07-16 PROCEDURE — 250N000013 HC RX MED GY IP 250 OP 250 PS 637: Performed by: SURGERY

## 2023-07-16 PROCEDURE — 97530 THERAPEUTIC ACTIVITIES: CPT | Mod: GP | Performed by: PHYSICAL THERAPIST

## 2023-07-16 PROCEDURE — 200N000001 HC R&B ICU

## 2023-07-16 PROCEDURE — 85027 COMPLETE CBC AUTOMATED: CPT | Performed by: INTERNAL MEDICINE

## 2023-07-16 PROCEDURE — 99233 SBSQ HOSP IP/OBS HIGH 50: CPT | Performed by: HOSPITALIST

## 2023-07-16 PROCEDURE — 84132 ASSAY OF SERUM POTASSIUM: CPT | Performed by: HOSPITALIST

## 2023-07-16 PROCEDURE — 97535 SELF CARE MNGMENT TRAINING: CPT | Mod: GO

## 2023-07-16 PROCEDURE — 250N000013 HC RX MED GY IP 250 OP 250 PS 637: Performed by: INTERNAL MEDICINE

## 2023-07-16 PROCEDURE — 999N000157 HC STATISTIC RCP TIME EA 10 MIN

## 2023-07-16 PROCEDURE — 250N000013 HC RX MED GY IP 250 OP 250 PS 637: Performed by: HOSPITALIST

## 2023-07-16 PROCEDURE — 999N000215 HC STATISTIC HFNC ADULT NON-CPAP

## 2023-07-16 PROCEDURE — 250N000011 HC RX IP 250 OP 636: Mod: JZ | Performed by: HOSPITALIST

## 2023-07-16 PROCEDURE — 94640 AIRWAY INHALATION TREATMENT: CPT | Mod: 76

## 2023-07-16 PROCEDURE — 250N000009 HC RX 250: Performed by: INTERNAL MEDICINE

## 2023-07-16 PROCEDURE — 80048 BASIC METABOLIC PNL TOTAL CA: CPT | Performed by: INTERNAL MEDICINE

## 2023-07-16 PROCEDURE — 84100 ASSAY OF PHOSPHORUS: CPT | Performed by: HOSPITALIST

## 2023-07-16 PROCEDURE — 94640 AIRWAY INHALATION TREATMENT: CPT

## 2023-07-16 PROCEDURE — 83735 ASSAY OF MAGNESIUM: CPT | Performed by: HOSPITALIST

## 2023-07-16 RX ORDER — CLONAZEPAM 0.5 MG/1
0.5 TABLET ORAL AT BEDTIME
Status: COMPLETED | OUTPATIENT
Start: 2023-07-16 | End: 2023-07-16

## 2023-07-16 RX ORDER — CLONAZEPAM 0.5 MG/1
0.5 TABLET ORAL ONCE
Status: COMPLETED | OUTPATIENT
Start: 2023-07-16 | End: 2023-07-16

## 2023-07-16 RX ORDER — POTASSIUM CHLORIDE 20MEQ/15ML
40 LIQUID (ML) ORAL ONCE
Status: COMPLETED | OUTPATIENT
Start: 2023-07-16 | End: 2023-07-16

## 2023-07-16 RX ORDER — MAGNESIUM SULFATE HEPTAHYDRATE 40 MG/ML
2 INJECTION, SOLUTION INTRAVENOUS ONCE
Status: COMPLETED | OUTPATIENT
Start: 2023-07-16 | End: 2023-07-16

## 2023-07-16 RX ORDER — POTASSIUM CHLORIDE 20MEQ/15ML
20 LIQUID (ML) ORAL ONCE
Status: COMPLETED | OUTPATIENT
Start: 2023-07-16 | End: 2023-07-16

## 2023-07-16 RX ORDER — FUROSEMIDE 10 MG/ML
20 INJECTION INTRAMUSCULAR; INTRAVENOUS ONCE
Status: COMPLETED | OUTPATIENT
Start: 2023-07-16 | End: 2023-07-16

## 2023-07-16 RX ADMIN — LEVOTHYROXINE SODIUM 112 MCG: 0.11 TABLET ORAL at 08:38

## 2023-07-16 RX ADMIN — CLONAZEPAM 0.5 MG: 0.5 TABLET ORAL at 20:45

## 2023-07-16 RX ADMIN — CLONAZEPAM 0.5 MG: 0.5 TABLET ORAL at 01:20

## 2023-07-16 RX ADMIN — NICOTINE 1 PATCH: 14 PATCH, EXTENDED RELEASE TRANSDERMAL at 08:38

## 2023-07-16 RX ADMIN — APIXABAN 5 MG: 5 TABLET, FILM COATED ORAL at 20:45

## 2023-07-16 RX ADMIN — MAGNESIUM SULFATE HEPTAHYDRATE 2 G: 2 INJECTION, SOLUTION INTRAVENOUS at 08:12

## 2023-07-16 RX ADMIN — MAGNESIUM OXIDE TAB 400 MG (241.3 MG ELEMENTAL MG) 400 MG: 400 (241.3 MG) TAB at 08:38

## 2023-07-16 RX ADMIN — DILTIAZEM HYDROCHLORIDE 60 MG: 30 TABLET, FILM COATED ORAL at 06:02

## 2023-07-16 RX ADMIN — DILTIAZEM HYDROCHLORIDE 60 MG: 30 TABLET, FILM COATED ORAL at 17:59

## 2023-07-16 RX ADMIN — Medication 15 ML: at 08:38

## 2023-07-16 RX ADMIN — PAROXETINE 20 MG: 10 SUSPENSION ORAL at 08:25

## 2023-07-16 RX ADMIN — LEVALBUTEROL HYDROCHLORIDE 0.63 MG: 0.63 SOLUTION RESPIRATORY (INHALATION) at 19:40

## 2023-07-16 RX ADMIN — BUDESONIDE 0.5 MG: 0.5 INHALANT ORAL at 07:34

## 2023-07-16 RX ADMIN — IPRATROPIUM BROMIDE 0.5 MG: 0.5 SOLUTION RESPIRATORY (INHALATION) at 15:33

## 2023-07-16 RX ADMIN — IPRATROPIUM BROMIDE 0.5 MG: 0.5 SOLUTION RESPIRATORY (INHALATION) at 12:09

## 2023-07-16 RX ADMIN — POTASSIUM CHLORIDE 20 MEQ: 20 SOLUTION ORAL at 14:22

## 2023-07-16 RX ADMIN — LEVALBUTEROL HYDROCHLORIDE 0.63 MG: 0.63 SOLUTION RESPIRATORY (INHALATION) at 15:33

## 2023-07-16 RX ADMIN — IPRATROPIUM BROMIDE 0.5 MG: 0.5 SOLUTION RESPIRATORY (INHALATION) at 07:34

## 2023-07-16 RX ADMIN — ACETAMINOPHEN 650 MG: 325 SOLUTION ORAL at 20:45

## 2023-07-16 RX ADMIN — IPRATROPIUM BROMIDE 0.5 MG: 0.5 SOLUTION RESPIRATORY (INHALATION) at 19:40

## 2023-07-16 RX ADMIN — LEVALBUTEROL HYDROCHLORIDE 0.63 MG: 0.63 SOLUTION RESPIRATORY (INHALATION) at 12:09

## 2023-07-16 RX ADMIN — ATORVASTATIN CALCIUM 20 MG: 20 TABLET, FILM COATED ORAL at 08:38

## 2023-07-16 RX ADMIN — BUDESONIDE 0.5 MG: 0.5 INHALANT ORAL at 19:40

## 2023-07-16 RX ADMIN — DILTIAZEM HYDROCHLORIDE 60 MG: 30 TABLET, FILM COATED ORAL at 12:38

## 2023-07-16 RX ADMIN — LEVALBUTEROL HYDROCHLORIDE 0.63 MG: 0.63 SOLUTION RESPIRATORY (INHALATION) at 07:34

## 2023-07-16 RX ADMIN — DILTIAZEM HYDROCHLORIDE 60 MG: 30 TABLET, FILM COATED ORAL at 23:31

## 2023-07-16 RX ADMIN — POTASSIUM CHLORIDE 40 MEQ: 20 SOLUTION ORAL at 08:24

## 2023-07-16 RX ADMIN — AMIODARONE HYDROCHLORIDE 200 MG: 200 TABLET ORAL at 08:38

## 2023-07-16 RX ADMIN — FUROSEMIDE 20 MG: 10 INJECTION, SOLUTION INTRAMUSCULAR; INTRAVENOUS at 13:31

## 2023-07-16 ASSESSMENT — ACTIVITIES OF DAILY LIVING (ADL)
ADLS_ACUITY_SCORE: 36
ADLS_ACUITY_SCORE: 34
ADLS_ACUITY_SCORE: 34
ADLS_ACUITY_SCORE: 38
ADLS_ACUITY_SCORE: 34
ADLS_ACUITY_SCORE: 38

## 2023-07-16 NOTE — PROGRESS NOTES
[facility]    Hospitalist Progress Note      Assessment & Plan   Lara Melendez is a 57 year old female with a history of COPD on prn home oxygen 2-3 L/nc, tobacco dependence, htn/hlp, PAF chronically on apixaban, anxiety, hypothyroidism, psoriasis admitted on 6/20/2023 with SOB and wheezing.     Patient has had a protracted admission here at [facility].  Her symptoms reportedly started a few days prior to admission and she called her pulmonologist who initiated empiric azithromycin and prednisone.  Symptoms continued to worsen so EMS was called and she was brought to the ER.  Her respiratory status continued to decline and she required intubation the day after admission.  She has been treated for community-acquired pneumonia along with COPD exacerbation with steroids and antibiotics.  While on the ventilator, patient had significant bronchospasm and high airway pressures requiring deep sedation and paralysis.  [facility] was actually consulted for possibility of ECMO but she was not deemed an appropriate candidate.  She was also found to have a large pneumothorax on 6/26 with chest tube placed by IR which has subsequently been removed (pulled on 7/10).  She has also had recurrent A-fib with RVR requiring both cardioversion and diltiazem with amiodarone.  She was continued on treatment with steroids, nebs, antibiotics and aggressive diuresis.  Paralytics were able to be weaned off but she did require tracheostomy for long-term ventilator weaning.     Patient had worsening abdominal distention on 7/11.  She had a CT scan done that was concerning for large bowel obstruction.  Colorectal surgery was consulted and she underwent surgical repair in the early a.m. of 7/12.     Plan for today:   -Continuing therapies including speech therapy  -Resume eliquis  -1 dose of 20 mg IV lasix for some fluid overload.      #Acute on chronic hypoxic and hypercapnic respiratory failure. COPD with acute exacerbation  chronically on 2-3 L/nc prn. Left-sided ptx. PNA with sputum + for klebsiella oxytoca. Pulmonary edema 2/2 fluid resuscitation for sepsis status post tracheotomy  Complicated respiratory course-initially intubated due to progressive decline in respiratory function after admission-high airway pressures from severe bronchospasm required deep sedation including paralysis with vec.  She was also treated with 24 hours of continuous albuterol neb.  Lower respiratory track positive for Klebsiella as above along with rhinovirus.  -Patient completed steroid course.  She also completed course of IV antibiotics.  -Chest tube placed by IR on 6/26 for pneumothorax, subsequently removed on 7/10.  -Continue on DuoNebs.  Appreciate pulmonary consultation.  -IV Lasix given prn for volume overload.   -Will need placement for continued recovery. Now looking at acute rehab. Needs to recover from surgical perspective first.  -Speech is working with the patient.  Trach down-sized on 7/15 with better comfort and able to talk more.   -Appreciate intensivist assistance  -Droplet precautions     #Ileus. Large bowel obstruction s/p diverting colostomy: First tried bowel meds without improvement.  Patient had worsening abdominal distention and pain.  CT abdomen pelvis done on 7/12 showed colonic dilation with abrupt termination at the mid sigmoid colon with suggestion of pneumatosis.  Colorectal surgery was consulted.  Underwent operative repair early a.m. of 7/12.  Seem to tolerate well.  Appreciate colorectal surgery for postoperative cares.    -Starting on tube feeds again on 7/14.  -We have been holding some nonessential medications per surgery team recommendations initially.  Reinitiation of tube feeds, these meds have now been resumed.  Tolerating feeds.     #Paroxysmal AF/SVT: Pta on diltiazem and apixaban (BB being avoided due to severe COPD). Required DCCV x 2 during this hospitalization.    -Continue on oral amiodarone along with  diltiazem.  Holding DOAC with recent surgery.  Resume once able per colorectal.  Switch to Lovenox for prophylaxis.     #Sepsis. Elevated lactate: Her hospital course has been peppered with episodic sepsis with leukocytosis/tachycardia/fever/respiratory failure etc  -resolved  -elevated lactate due to sepsis, and nebs      #Hypotension: Did require transient phenylephrine support due to deep sedation. Now resolved.      #CAUTI.  Pseudomonas UTI: El cath placed for critical illness, UA 6/28 with inflammatory cells with large blood. UCx with  K with pseudomonas  -rec'd 2 days of liberty followed by 4 days of cefepime, abx completed 7/6  -We will keep El in place for now given postoperative status.  Likely remove for voiding trial in the coming days.     #Hyperkalemia. Hypokalemia: Potassium bola to 5.5 and persisted in that range for a day or 2.  She received several doses of Lokelma and potassium level has now normalized and actually downtrending.  Unclear etiology.  Renal function is normal.  Her PTA losartan has been on hold.  Replacement as needed.  Monitor.     #Hypernatremia: Significant rise in sodium up to 160 with tube feed initiation.  Improved with high-dose free water via NG and D5, but remained at about 150.  Sodium is now normalized after dose of metolazone and Lasix drip.  Monitoring     #Hypertension pta on dilt and losartan. dilt back on and being titrated. Losartan discontinued in the setting of hyperkalemia     #Anemia  Drifting down during hospitalization and now at 6.8-otherwise hemodynamically stable. No clear bleeding noted.  Suspect critical illness/venipuncture mediated  -transfused single unit PRBC 7/8     DVT Prophylaxis: Resumed DOAC on 7/16.   Code Status: Full Code  Lines: Peripheral IVs, Removed el catheter for voiding trial on 7/15.   Dispo: Continue ICU cares     Discussed with intensivist.  Discussed with bedside nurse.    52 minutes of care provided.     Tommie Ren  MD    Interval History   No events. Feels stronger each day. Feels a bit puffy in all extremities. No pain.     -Data reviewed today: I reviewed all new labs and imaging results over the last 24 hours.     Physical Exam   Temp: 98.3  F (36.8  C) Temp src: Oral BP: 117/71 Pulse: 73   Resp: 26 SpO2: 97 % O2 Device: (S) None (Room air) (Trach capped) Oxygen Delivery: 25 LPM  Vitals:    07/14/23 0400 07/15/23 0545 07/16/23 0700   Weight: 69.3 kg (152 lb 12.5 oz) 69.6 kg (153 lb 7 oz) 68.1 kg (150 lb 2.1 oz)     Vital Signs with Ranges  Temp:  [97.7  F (36.5  C)-100  F (37.8  C)] 98.3  F (36.8  C)  Pulse:  [61-96] 73  Resp:  [20-36] 26  BP: (106-132)/(60-85) 117/71  FiO2 (%):  [0.2 %-21 %] 21 %  SpO2:  [92 %-98 %] 97 %  I/O last 3 completed shifts:  In: 2175 [NG/GT:615]  Out: 1940 [Urine:1200; Stool:740]    Constitutional: No acute distress.  Awake and alert.  Answers appropriately.  HEENT: Normocephalic.  Trach in place.  Trach site appears clean.  Respiratory: Moving air bilaterally.    Prolonged expiratory phase.  Minimal wheeze.  Cardiovascular: Nontachycardic, irregular, no murmur  GI: Ostomy in place with loose stool.  Bowel sounds appreciated.  Skin/Integumen: WWP, no rash. No edema  Neuro: Awake.  Answers appropriately.  Moves all extremities.     Medications     dextrose Stopped (07/14/23 1400)     lactated ringers Stopped (06/28/23 0011)     - MEDICATION INSTRUCTIONS -         amiodarone  200 mg Oral or Feeding Tube Daily     apixaban ANTICOAGULANT  5 mg Per Feeding Tube BID     atorvastatin  20 mg Per Feeding Tube Daily     budesonide  0.5 mg Nebulization BID     diltiazem  60 mg Oral Q6H ELZBIETA     furosemide  20 mg Intravenous Once     ipratropium  0.5 mg Nebulization 4x daily     levalbuterol  0.63 mg Nebulization 4x Daily     levothyroxine  112 mcg Per Feeding Tube Daily     magnesium oxide  400 mg Oral or Feeding Tube Daily     menthol   Transdermal Q8H     multivitamins w/minerals  15 mL Per Feeding Tube  Daily     nicotine  1 patch Transdermal Daily     nicotine   Transdermal Q8H     PARoxetine  20 mg Per Feeding Tube Daily     protein modular  1 packet Per Feeding Tube Daily     sodium chloride (PF)  10-40 mL Intracatheter Q8H     sodium chloride (PF)  3 mL Intracatheter Q8H       Data   Recent Labs   Lab 07/16/23  0839 07/16/23  0558 07/15/23  2341 07/15/23  0542 07/15/23  0540 07/14/23  1620 07/14/23  1247 07/14/23  0814 07/14/23  0456 07/12/23  0734 07/12/23  0443   WBC  --  4.5  --   --  4.8  --   --   --  5.1   < > 6.9   HGB  --  8.3*  --   --  8.7*  --   --   --  8.2*   < > 8.6*   MCV  --  100  --   --  98  --   --   --  99   < > 100   PLT  --  212  --   --  201  --   --   --  185   < > 181   NA  --  136  --   --  135*  --   --   --  134*   < > 138   POTASSIUM  --  3.4  --   --  3.8  --  3.7  --  3.2*   < > 3.1*   CHLORIDE  --  104  --   --  104  --   --   --  100   < > 101   CO2  --  24  --   --  25  --   --   --  25   < > 27   BUN  --  7.9*  --   --  5.9*  --   --   --  5.8*   < > 12.8   CR  --  0.41*  --   --  0.48*  --   --   --  0.43*   < > 0.46*   ANIONGAP  --  8  --   --  6*  --   --   --  9   < > 10   EDIN  --  7.9*  --   --  8.0*  --   --   --  7.8*   < > 8.3*   * 126* 115*   < > 111*   < >  --    < > 139*   < > 94   ALBUMIN  --   --   --   --   --   --   --   --   --   --  2.8*   PROTTOTAL  --   --   --   --   --   --   --   --   --   --  5.6*   BILITOTAL  --   --   --   --   --   --   --   --   --   --  0.5   ALKPHOS  --   --   --   --   --   --   --   --   --   --  82   ALT  --   --   --   --   --   --   --   --   --   --  53*   AST  --   --   --   --   --   --   --   --   --   --  28    < > = values in this interval not displayed.       No results found for this or any previous visit (from the past 24 hour(s)).

## 2023-07-16 NOTE — PROGRESS NOTES
Respiratory Therapy Note        Pt has transitioned from PMV to a cap on her trach.  She continues to tolerate it very well on room air.  No increase in WOB or SOB.  Breath sounds are diminished and sometimes coarse.  Her cough and secretions have decreased greatly since she started using the PMV and cap as she can cough them up on her own.      July 16, 2023 3:38 PM  Ron Hernandez, RT

## 2023-07-16 NOTE — PROGRESS NOTES
Patient continues to tolerate HFTD 25L. 21% oxygen. PMV in use with good phonation and no signs of distress. Patient has good strong cough with good ability to clear airway. Will continue to monitor.    RT Dre on 7/16/2023 at 4:53 AM

## 2023-07-16 NOTE — PROGRESS NOTES
"Colon & Rectal Surgery Progress Note             Interval History:   Postop Day #: 4  Tolerating TFs, stoma functioning, mucus discharge per rectum                Medications:   I have reviewed this patient's current medications               Physical Exam:   Blood pressure 132/80, pulse 82, temperature 98.3  F (36.8  C), temperature source Oral, resp. rate (!) 31, height 1.6 m (5' 3\"), weight 68.1 kg (150 lb 2.1 oz), SpO2 94 %.      Intake/Output Summary (Last 24 hours) at 7/15/2023 1057  Last data filed at 7/15/2023 0900  Gross per 24 hour   Intake 1665 ml   Output 2735 ml   Net -1070 ml       GEN:  alert  ABD:  Soft, stoma red, productive, RRC bridge in place         Data:        Lab Results   Component Value Date     07/14/2023     02/02/2006    Lab Results   Component Value Date    CHLORIDE 100 07/14/2023    CHLORIDE 99 06/21/2023    CHLORIDE 103 05/17/2022    CHLORIDE 109 02/02/2006    Lab Results   Component Value Date    BUN 5.8 07/14/2023    BUN 7 06/21/2023    BUN 17 05/17/2022    BUN <2 02/02/2006      Lab Results   Component Value Date    POTASSIUM 3.2 07/14/2023    POTASSIUM 5.1 06/21/2023    POTASSIUM 4.5 05/17/2022    POTASSIUM 3.4 02/02/2006    Lab Results   Component Value Date    CO2 25 07/14/2023    CO2 29 05/17/2022    CO2 17 02/02/2006    Lab Results   Component Value Date    CR 0.43 07/14/2023    CR 0.46 07/13/2023    CR 0.80 02/02/2006    CR 0.90 02/01/2006        Lab Results   Component Value Date    HGB 8.3 (L) 07/16/2023    HGB 8.7 (L) 07/15/2023     Lab Results   Component Value Date     07/16/2023     07/15/2023     Lab Results   Component Value Date    WBC 4.5 07/16/2023    WBC 4.8 07/15/2023            Assessment and Plan:   58 y/o woman admitted since 6/20 with respiratory failure, s/p trach and hospital course c/b LBO POD#4 s/p diverting loop transverse colostomy  - Cont tube feeds  -Stoma care and teaching  -ok to resume AC with eliquis today  - will need to " remove stoma RRC bridge prior to discharge. Will likely remove tomorrow  - Reviewed expectations regarding discharge per rectum  -Supportive care per ICU     Discussed with Dr. Colvin.     Iron Castanon MD  Colon and Rectal Surgery Fellow    [facility]  [address]  [place] [address]  T: [phone]  F: [phone]

## 2023-07-16 NOTE — PLAN OF CARE
ICU End of Shift Summary.  For vital signs and complete assessments, please see documentation flowsheets.      Pertinent assessments: Pt able to keep speaking valve on all night with no shortness of breath reported. Sats in the mid 90s on 25L 21% trach dome. LSs diminished throughout. Able to cough up small to moderate amounts of pale yellow sputum on her own with no deep suctioning needed. Denies any pain throughout shift. T max of 100 but decreased without intervention. Tele SR. Voiding without difficulty on bed pain. Continent BM x 1. Colostomy with 190 mL out. Denies any abdominal discomfort.   Major Shift Events: Pt unable to sleep by 0100 tonight and when asked if she every takes anything for sleep she said she takes Clonazepam daily normally at home. Not yet ordered here. Talked to [facility] who placed a one time order for this med with rounding MD to address daily order. Pt able to get some good sleep after this was given.  Plan (Upcoming Events): Continue to wean O2 as able. Continue to increase activity as patient tolerates. Pt would like to try eating something as soon as possible. PT/OT/SP following.  Discharge/Transfer Needs: TBD. Continue ICU cares at this time.     Bedside Shift Report Completed   Bedside Safety Check Completed    Goal Outcome Evaluation:      Plan of Care Reviewed With: patient    Overall Patient Progress: improvingOverall Patient Progress: improving

## 2023-07-17 ENCOUNTER — APPOINTMENT (OUTPATIENT)
Dept: PHYSICAL THERAPY | Facility: CLINIC | Age: 58
End: 2023-07-17
Payer: COMMERCIAL

## 2023-07-17 ENCOUNTER — APPOINTMENT (OUTPATIENT)
Dept: OCCUPATIONAL THERAPY | Facility: CLINIC | Age: 58
End: 2023-07-17
Payer: COMMERCIAL

## 2023-07-17 ENCOUNTER — APPOINTMENT (OUTPATIENT)
Dept: SPEECH THERAPY | Facility: CLINIC | Age: 58
End: 2023-07-17
Payer: COMMERCIAL

## 2023-07-17 LAB
ANION GAP SERPL CALCULATED.3IONS-SCNC: 9 MMOL/L (ref 7–15)
BUN SERPL-MCNC: 9.3 MG/DL (ref 8–23)
CALCIUM SERPL-MCNC: 8 MG/DL (ref 8.6–10)
CHLORIDE SERPL-SCNC: 104 MMOL/L (ref 98–107)
CREAT SERPL-MCNC: 0.41 MG/DL (ref 0.51–0.95)
DEPRECATED HCO3 PLAS-SCNC: 24 MMOL/L (ref 22–29)
ERYTHROCYTE [DISTWIDTH] IN BLOOD BY AUTOMATED COUNT: 16 % (ref 10–15)
GFR SERPL CREATININE-BSD FRML MDRD: >90 ML/MIN/1.73M2
GLUCOSE BLDC GLUCOMTR-MCNC: 114 MG/DL (ref 70–99)
GLUCOSE BLDC GLUCOMTR-MCNC: 118 MG/DL (ref 70–99)
GLUCOSE BLDC GLUCOMTR-MCNC: 128 MG/DL (ref 70–99)
GLUCOSE BLDC GLUCOMTR-MCNC: 131 MG/DL (ref 70–99)
GLUCOSE SERPL-MCNC: 114 MG/DL (ref 70–99)
HCT VFR BLD AUTO: 26.8 % (ref 35–47)
HGB BLD-MCNC: 8.5 G/DL (ref 11.7–15.7)
MAGNESIUM SERPL-MCNC: 1.9 MG/DL (ref 1.7–2.3)
MCH RBC QN AUTO: 31.4 PG (ref 26.5–33)
MCHC RBC AUTO-ENTMCNC: 31.7 G/DL (ref 31.5–36.5)
MCV RBC AUTO: 99 FL (ref 78–100)
PHOSPHATE SERPL-MCNC: 3 MG/DL (ref 2.5–4.5)
PLATELET # BLD AUTO: 209 10E3/UL (ref 150–450)
POTASSIUM SERPL-SCNC: 3.7 MMOL/L (ref 3.4–5.3)
RBC # BLD AUTO: 2.71 10E6/UL (ref 3.8–5.2)
SODIUM SERPL-SCNC: 137 MMOL/L (ref 136–145)
WBC # BLD AUTO: 5 10E3/UL (ref 4–11)

## 2023-07-17 PROCEDURE — 250N000009 HC RX 250: Performed by: INTERNAL MEDICINE

## 2023-07-17 PROCEDURE — 80048 BASIC METABOLIC PNL TOTAL CA: CPT | Performed by: INTERNAL MEDICINE

## 2023-07-17 PROCEDURE — 97535 SELF CARE MNGMENT TRAINING: CPT | Mod: GO | Performed by: OCCUPATIONAL THERAPIST

## 2023-07-17 PROCEDURE — 99231 SBSQ HOSP IP/OBS SF/LOW 25: CPT | Performed by: ANESTHESIOLOGY

## 2023-07-17 PROCEDURE — 250N000013 HC RX MED GY IP 250 OP 250 PS 637: Performed by: INTERNAL MEDICINE

## 2023-07-17 PROCEDURE — 99233 SBSQ HOSP IP/OBS HIGH 50: CPT | Performed by: HOSPITALIST

## 2023-07-17 PROCEDURE — 999N000157 HC STATISTIC RCP TIME EA 10 MIN

## 2023-07-17 PROCEDURE — 92610 EVALUATE SWALLOWING FUNCTION: CPT | Mod: GN | Performed by: SPEECH-LANGUAGE PATHOLOGIST

## 2023-07-17 PROCEDURE — 94640 AIRWAY INHALATION TREATMENT: CPT

## 2023-07-17 PROCEDURE — 92507 TX SP LANG VOICE COMM INDIV: CPT | Mod: GN | Performed by: SPEECH-LANGUAGE PATHOLOGIST

## 2023-07-17 PROCEDURE — 250N000013 HC RX MED GY IP 250 OP 250 PS 637: Performed by: HOSPITALIST

## 2023-07-17 PROCEDURE — 250N000011 HC RX IP 250 OP 636: Mod: JZ | Performed by: HOSPITALIST

## 2023-07-17 PROCEDURE — 97110 THERAPEUTIC EXERCISES: CPT | Mod: GO | Performed by: OCCUPATIONAL THERAPIST

## 2023-07-17 PROCEDURE — 84100 ASSAY OF PHOSPHORUS: CPT | Performed by: HOSPITALIST

## 2023-07-17 PROCEDURE — 999N000040 HC STATISTIC CONSULT NO CHARGE VASC ACCESS

## 2023-07-17 PROCEDURE — 250N000013 HC RX MED GY IP 250 OP 250 PS 637: Performed by: SURGERY

## 2023-07-17 PROCEDURE — 200N000001 HC R&B ICU

## 2023-07-17 PROCEDURE — 83735 ASSAY OF MAGNESIUM: CPT | Performed by: HOSPITALIST

## 2023-07-17 PROCEDURE — 97110 THERAPEUTIC EXERCISES: CPT | Mod: GP | Performed by: PHYSICAL THERAPIST

## 2023-07-17 PROCEDURE — 85027 COMPLETE CBC AUTOMATED: CPT | Performed by: INTERNAL MEDICINE

## 2023-07-17 PROCEDURE — 94640 AIRWAY INHALATION TREATMENT: CPT | Mod: 76

## 2023-07-17 PROCEDURE — 92526 ORAL FUNCTION THERAPY: CPT | Mod: GN | Performed by: SPEECH-LANGUAGE PATHOLOGIST

## 2023-07-17 RX ORDER — POTASSIUM CHLORIDE 20MEQ/15ML
20 LIQUID (ML) ORAL ONCE
Status: COMPLETED | OUTPATIENT
Start: 2023-07-17 | End: 2023-07-17

## 2023-07-17 RX ORDER — MAGNESIUM SULFATE HEPTAHYDRATE 40 MG/ML
2 INJECTION, SOLUTION INTRAVENOUS ONCE
Status: COMPLETED | OUTPATIENT
Start: 2023-07-17 | End: 2023-07-17

## 2023-07-17 RX ORDER — CLONAZEPAM 0.5 MG/1
0.5 TABLET ORAL DAILY PRN
Status: DISCONTINUED | OUTPATIENT
Start: 2023-07-17 | End: 2023-07-28 | Stop reason: HOSPADM

## 2023-07-17 RX ADMIN — BUDESONIDE 0.5 MG: 0.5 INHALANT ORAL at 07:45

## 2023-07-17 RX ADMIN — LEVALBUTEROL HYDROCHLORIDE 0.63 MG: 0.63 SOLUTION RESPIRATORY (INHALATION) at 19:52

## 2023-07-17 RX ADMIN — ACETAMINOPHEN 650 MG: 325 SOLUTION ORAL at 12:32

## 2023-07-17 RX ADMIN — BUDESONIDE 0.5 MG: 0.5 INHALANT ORAL at 19:52

## 2023-07-17 RX ADMIN — ACETAMINOPHEN 650 MG: 325 SOLUTION ORAL at 06:00

## 2023-07-17 RX ADMIN — IPRATROPIUM BROMIDE 0.5 MG: 0.5 SOLUTION RESPIRATORY (INHALATION) at 15:07

## 2023-07-17 RX ADMIN — Medication 15 ML: at 08:59

## 2023-07-17 RX ADMIN — CLONAZEPAM 0.5 MG: 0.5 TABLET ORAL at 18:18

## 2023-07-17 RX ADMIN — APIXABAN 5 MG: 5 TABLET, FILM COATED ORAL at 09:00

## 2023-07-17 RX ADMIN — LEVOTHYROXINE SODIUM 112 MCG: 0.11 TABLET ORAL at 09:00

## 2023-07-17 RX ADMIN — IPRATROPIUM BROMIDE 0.5 MG: 0.5 SOLUTION RESPIRATORY (INHALATION) at 12:07

## 2023-07-17 RX ADMIN — ATORVASTATIN CALCIUM 20 MG: 20 TABLET, FILM COATED ORAL at 08:59

## 2023-07-17 RX ADMIN — IPRATROPIUM BROMIDE 0.5 MG: 0.5 SOLUTION RESPIRATORY (INHALATION) at 07:45

## 2023-07-17 RX ADMIN — LEVALBUTEROL HYDROCHLORIDE 0.63 MG: 0.63 SOLUTION RESPIRATORY (INHALATION) at 15:07

## 2023-07-17 RX ADMIN — DILTIAZEM HYDROCHLORIDE 60 MG: 30 TABLET, FILM COATED ORAL at 12:28

## 2023-07-17 RX ADMIN — DILTIAZEM HYDROCHLORIDE 60 MG: 30 TABLET, FILM COATED ORAL at 05:33

## 2023-07-17 RX ADMIN — POTASSIUM CHLORIDE 20 MEQ: 20 SOLUTION ORAL at 05:33

## 2023-07-17 RX ADMIN — DILTIAZEM HYDROCHLORIDE 60 MG: 30 TABLET, FILM COATED ORAL at 18:17

## 2023-07-17 RX ADMIN — CYCLOBENZAPRINE HYDROCHLORIDE 5 MG: 5 TABLET, FILM COATED ORAL at 22:01

## 2023-07-17 RX ADMIN — AMIODARONE HYDROCHLORIDE 200 MG: 200 TABLET ORAL at 08:59

## 2023-07-17 RX ADMIN — PAROXETINE 20 MG: 10 SUSPENSION ORAL at 08:59

## 2023-07-17 RX ADMIN — LEVALBUTEROL HYDROCHLORIDE 0.63 MG: 0.63 SOLUTION RESPIRATORY (INHALATION) at 07:45

## 2023-07-17 RX ADMIN — LEVALBUTEROL HYDROCHLORIDE 0.63 MG: 0.63 SOLUTION RESPIRATORY (INHALATION) at 12:07

## 2023-07-17 RX ADMIN — DILTIAZEM HYDROCHLORIDE 60 MG: 30 TABLET, FILM COATED ORAL at 23:03

## 2023-07-17 RX ADMIN — NICOTINE 1 PATCH: 14 PATCH, EXTENDED RELEASE TRANSDERMAL at 09:12

## 2023-07-17 RX ADMIN — APIXABAN 5 MG: 5 TABLET, FILM COATED ORAL at 20:00

## 2023-07-17 RX ADMIN — MAGNESIUM SULFATE HEPTAHYDRATE 2 G: 2 INJECTION, SOLUTION INTRAVENOUS at 05:33

## 2023-07-17 RX ADMIN — IPRATROPIUM BROMIDE 0.5 MG: 0.5 SOLUTION RESPIRATORY (INHALATION) at 19:52

## 2023-07-17 RX ADMIN — MAGNESIUM OXIDE TAB 400 MG (241.3 MG ELEMENTAL MG) 400 MG: 400 (241.3 MG) TAB at 08:59

## 2023-07-17 RX ADMIN — ACETAMINOPHEN 650 MG: 325 SOLUTION ORAL at 18:25

## 2023-07-17 ASSESSMENT — ACTIVITIES OF DAILY LIVING (ADL)
ADLS_ACUITY_SCORE: 40
ADLS_ACUITY_SCORE: 36
ADLS_ACUITY_SCORE: 40

## 2023-07-17 NOTE — PROGRESS NOTES
"CLINICAL SWALLOW EVALUATION       07/17/23 0958   Appointment Info   Signing Clinician's Name / Credentials (SLP) Krista Tejada M.A., CCC-SLP, [facility]   General Information   Onset of Illness/Injury or Date of Surgery 06/20/23   Referring Physician Dr. Tommie Ren   Patient/Family Therapy Goal Statement (SLP) Patient would like pop and water.   Pertinent History of Current Problem Per MD summary: \"Lara Melendez is a 57 year old female with a history of COPD on prn home oxygen 2-3 L/nc, tobacco dependence, htn/hlp, PAF chronically on apixaban, anxiety, hypothyroidism, psoriasis admitted on 6/20/2023 with SOB and wheezing.     Patient has had a protracted admission here at [facility].  Her symptoms reportedly started a few days prior to admission and she called her pulmonologist who initiated empiric azithromycin and prednisone.  Symptoms continued to worsen so EMS was called and she was brought to the ER.  Her respiratory status continued to decline and she required intubation the day after admission.  She has been treated for community-acquired pneumonia along with COPD exacerbation with steroids and antibiotics.  While on the ventilator, patient had significant bronchospasm and high airway pressures requiring deep sedation and paralysis.  [facility] was actually consulted for possibility of ECMO but she was not deemed an appropriate candidate.  She was also found to have a large pneumothorax on 6/26 with chest tube placed by IR which has subsequently been removed (pulled on 7/10).  She has also had recurrent A-fib with RVR requiring both cardioversion and diltiazem with amiodarone.  She was continued on treatment with steroids, nebs, antibiotics and aggressive diuresis.  Paralytics were able to be weaned off but she did require tracheostomy for long-term ventilator weaning.     Patient had worsening abdominal distention on 7/11.  She had a CT scan done that was concerning for large bowel obstruction.  Colorectal surgery was " "consulted and she underwent surgical repair in the early a.m. of 7/12.\" Patient has tolerated PMSV and now trach capping over the past 24-48 hours, reporting she has not needed to remove trach cap for suctioning. She is tolerating ice chip intake per care team.  No dysphagia hx noted or reported prior to this admission.   Type of Evaluation   Type of Evaluation Swallow Evaluation   Tracheostomy Assessment (Speaking Valve)   Comment, Tracheostomy (Speaking Valve) Trach capped   Oral/Tracheal Secretions (Speaking Valve)   Oral Secretions (Speaking Valve Assessment) minimal secretions   Oral Motor   Oral Musculature generally intact   Structural Abnormalities none present   Dentition (Oral Motor)   Dentition (Oral Motor) natural dentition   Facial Symmetry (Oral Motor)   Facial Symmetry (Oral Motor) WNL   Lip Function (Oral Motor)   Lip Range of Motion (Oral Motor) WNL   Tongue Function (Oral Motor)   Tongue Strength (Oral Motor) WNL   Tongue ROM (Oral Motor) WNL   Cough/Swallow/Gag Reflex (Oral Motor)   Soft Palate/Velum (Oral Motor) WNL   Gag Reflex (Oral Motor) WNL   Volitional Throat Clear/Cough (Oral Motor) WNL   Volitional Swallow (Oral Motor) WNL   Vocal Quality/Secretion Management (Oral Motor)   Vocal Quality (Oral Motor) WNL   Comment, Vocal Quality/Secretion Management (Oral Motor) Voicing strong and clear   General Swallowing Observations   Past History of Dysphagia None   Respiratory Support (General Swallowing Observations) none  (Capped trach, on room air)   Current Diet/Method of Nutritional Intake (General Swallowing Observations, NIS) NPO   Swallowing Evaluation Clinical swallow evaluation   Clinical Swallow Evaluation   Feeding Assistance frequent cues/help required   Additional evaluation(s) completed today No   Clinical Swallow Evaluation Textures Trialed thin liquids   Clinical Swallow Eval: Thin Liquid Texture Trial   Mode of Presentation, Thin Liquids spoon;straw;self-fed;fed by clinician "   Volume of Liquid or Food Presented 4 oz   Oral Phase of Swallow WFL   Pharyngeal Phase of Swallow impaired;repeated swallows;wet vocal quality after swallow   Diagnostic Statement Minimally wet quality x1, cleared with throat clear and double swallow   Swallowing Recommendations   Diet Consistency Recommendations thin liquids (level 0);clear liquid diet   Supervision Level for Intake 1:1 supervision needed   Mode of Delivery Recommendations bolus size, small   Swallowing Maneuver Recommendations throat clear-swallow   Monitoring/Assistance Required (Eating/Swallowing) stop eating activities when fatigue is present;monitor for cough or change in vocal quality with intake   Recommended Feeding/Eating Techniques (Swallow Eval) provide assist with feeding;maintain upright sitting position for eating;maintain upright posture during/after eating for 30 minutes;place speaking valve on for intake  (Trach capped)   Medication Administration Recommendations, Swallowing (SLP) Non oral recommended at this time   Comment, Swallowing Recommendations Will consider video swallow study pending progress and diet advancement at bedside, no hx of dysphagia.  Recommend consider waiting until after decannulation and NG removal for an instrumental swallow eval unless any Sx of aspiration occur   General Therapy Interventions   Planned Therapy Interventions Dysphagia Treatment   Clinical Impression   Criteria for Skilled Therapeutic Interventions Met (SLP Eval) Yes, treatment indicated   SLP Diagnosis Mild oropharyngeal dysphagia   Risks & Benefits of therapy have been explained evaluation/treatment results reviewed;patient   Clinical Impression Comments Good endurance for PO trials, stable respiration on room air with trach capped and no overt Sx of aspiration for 95% of thin liquid trials by spoon or straw, exhibiting wet quality and throat clear x1.   SLP Total Evaluation Time   Eval: oral/pharyngeal swallow function, clinical swallow  Minutes (88470) 25                      Interventions   Interventions Quick Adds Swallowing Dysfunction;Speech, Language, Voice & Communication   Swallowing Dysfunction &/or Oral Function for Feeding   Treatment of Swallowing Dysfunction &/or Oral Function for Feeding Minutes (42120) 10   Symptoms Noted During/After Treatment None   Treatment Detail/Skilled Intervention Trained patient re: safe swallowing strategies.  Patient verbalized comprehension of need for fully upright positioning, small single sips at a time, throat clear + swallow as needed per voice quality cueing.   Speech, Language, Voice Communication&/or Auditory Processing   Treatment of Speech, Language, Voice Communication&/or Auditory Processing Minutes (65006) 10   Symptoms Noted During/After Treatment None   Treatment Detail/Skilled Intervention Speaking valve goals met: patient verbalized comprehension of benefits trained of trach capping or use of PMSV during swallowing.  Patient's voicing is functional, strong and clear at greater than 3 ft distance across entire session.  Goal met.   SLP Discharge Planning   SLP Plan Swallow treatment, full liquid trials   SLP Discharge Recommendation LTACH, pending meets medical criteria;[facility]ute [facility]hab [facility]-Motivated patient will benefit from intensive, interdisciplinary therapy.  Anticipate will be able to tolerate 3 hours of therapy per day   SLP Rationale for DC Rec Recommend ongoing dysphagia intervention, remains below baseline.  Trach goals now met if remaining capped.   SLP Brief overview of current status  Recommend consider decannulation if trach capping trial is complete.  Clinical swallow evaluation completed with recommendation for initiation of clear liquid diet by spoon or straw, small sips, upright and alert for any intake, occasional throat clear + swallow for secretion management, eat only with trach capped or speaking valve on if capping trial is failed.   Total Session Time   Total Session  Time (sum of timed and untimed services) 45

## 2023-07-17 NOTE — PROGRESS NOTES
[facility]    Hospitalist Progress Note      Assessment & Plan   Lara Melendez is a 57 year old female with a history of COPD on prn home oxygen 2-3 L/nc, tobacco dependence, htn/hlp, PAF chronically on apixaban, anxiety, hypothyroidism, psoriasis admitted on 6/20/2023 with SOB and wheezing.     Patient has had a protracted admission here at [facility].  Her symptoms reportedly started a few days prior to admission and she called her pulmonologist who initiated empiric azithromycin and prednisone.  Symptoms continued to worsen so EMS was called and she was brought to the ER.  Her respiratory status continued to decline and she required intubation the day after admission.  She has been treated for community-acquired pneumonia along with COPD exacerbation with steroids and antibiotics.  While on the ventilator, patient had significant bronchospasm and high airway pressures requiring deep sedation and paralysis.  [facility] was actually consulted for possibility of ECMO but she was not deemed an appropriate candidate.  She was also found to have a large pneumothorax on 6/26 with chest tube placed by IR which has subsequently been removed (pulled on 7/10).  She has also had recurrent A-fib with RVR requiring both cardioversion and diltiazem with amiodarone.  She was continued on treatment with steroids, nebs, antibiotics and aggressive diuresis.  Paralytics were able to be weaned off but she did require tracheostomy for long-term ventilator weaning.     Patient had worsening abdominal distention on 7/11.  She had a CT scan done that was concerning for large bowel obstruction.  Colorectal surgery was consulted and she underwent surgical repair in the early a.m. of 7/12.     Plan for today:   -Continuing therapies. Speech therapy advanced diet to include liquids.  -Post-surgical cares per colorectal team.   -Stoma cares and teaching.   -Monitor secretions at trach site. Discussed with intensivist. Not  requiring ventilatory support and doing well.  May be worth monitoring for next day or 2 and possibly de-cannulate prior to discharge to LTACH.      #Acute on chronic hypoxic and hypercapnic respiratory failure. COPD with acute exacerbation chronically on 2-3 L/nc prn. Left-sided ptx. PNA with sputum + for klebsiella oxytoca. Pulmonary edema 2/2 fluid resuscitation for sepsis status post tracheotomy  Complicated respiratory course-initially intubated due to progressive decline in respiratory function after admission-high airway pressures from severe bronchospasm required deep sedation including paralysis with vec.  She was also treated with 24 hours of continuous albuterol neb.  Lower respiratory track positive for Klebsiella as above along with rhinovirus.  -Patient completed steroid course.  She also completed course of IV antibiotics.  -Chest tube placed by IR on 6/26 for pneumothorax, subsequently removed on 7/10.  -Continue on DuoNebs.  Appreciate pulmonary consultation.  -IV Lasix given prn for volume overload.   -Will need placement for continued recovery. Now looking at acute rehab. Needs to recover from surgical perspective first.  -Speech is working with the patient.  Trach down-sized on 7/15 with better comfort and able to talk more.    -She is on RA on 7/17. Monitor secretions and if doing well then may be able to de-cannulate prior to discharge to LTACH.   -Appreciate intensivist assistance  -Droplet precautions     #Ileus. Large bowel obstruction s/p diverting colostomy: First tried bowel meds without improvement.  Patient had worsening abdominal distention and pain.  CT abdomen pelvis done on 7/12 showed colonic dilation with abrupt termination at the mid sigmoid colon with suggestion of pneumatosis.  Colorectal surgery was consulted.  Underwent operative repair early a.m. of 7/12.  Seem to tolerate well.  Appreciate colorectal surgery for postoperative cares.    -Starting on tube feeds again on  7/14.  -We have been holding some nonessential medications per surgery team recommendations initially.  Reinitiation of tube feeds, these meds have now been resumed.  Tolerating feeds.     #Paroxysmal AF/SVT: Pta on diltiazem and apixaban (BB being avoided due to severe COPD). Required DCCV x 2 during this hospitalization.    -Continue on oral amiodarone along with diltiazem.  Holding DOAC with recent surgery.  Resume once able per colorectal.  Switch to Lovenox for prophylaxis.     #Sepsis. Elevated lactate: Her hospital course has been peppered with episodic sepsis with leukocytosis/tachycardia/fever/respiratory failure etc  -resolved  -elevated lactate due to sepsis, and nebs      #Hypotension: Did require transient phenylephrine support due to deep sedation. Now resolved.      #CAUTI.  Pseudomonas UTI: Hines cath placed for critical illness, UA 6/28 with inflammatory cells with large blood. UCx with  K with pseudomonas  -rec'd 2 days of liberty followed by 4 days of cefepime, abx completed 7/6  -We will keep Hines in place for now given postoperative status.  Likely remove for voiding trial in the coming days.     #Hyperkalemia. Hypokalemia: Potassium bola to 5.5 and persisted in that range for a day or 2.  She received several doses of Lokelma and potassium level has now normalized and actually downtrending.  Unclear etiology.  Renal function is normal.  Her PTA losartan has been on hold.  Replacement as needed.  Monitor.     #Hypernatremia: Significant rise in sodium up to 160 with tube feed initiation.  Improved with high-dose free water via NG and D5, but remained at about 150.  Sodium is now normalized after dose of metolazone and Lasix drip.  Monitoring     #Hypertension pta on dilt and losartan. dilt back on and being titrated. Losartan discontinued in the setting of hyperkalemia     #Anemia  Drifting down during hospitalization and now at 6.8-otherwise hemodynamically stable. No clear bleeding noted.   Suspect critical illness/venipuncture mediated  -transfused single unit PRBC 7/8     DVT Prophylaxis: Resumed DOAC on 7/16.   Code Status: Full Code  Lines: Peripheral IVs, Removed el catheter for voiding trial on 7/15.   Dispo: Continue ICU cares     Discussed with intensivist.  Discussed with bedside nurse.    Tommie Ren MD    Interval History   No events. Doing well. Denies any worsening pain.     -Data reviewed today: I reviewed all new labs and imaging results over the last 24 hours.    Physical Exam   Temp: 98  F (36.7  C) Temp src: Temporal BP: 118/76 Pulse: 87   Resp: 27 SpO2: 98 % O2 Device: None (Room air)    Vitals:    07/15/23 0545 07/16/23 0700 07/17/23 0600   Weight: 69.6 kg (153 lb 7 oz) 68.1 kg (150 lb 2.1 oz) 65.5 kg (144 lb 6.4 oz)     Vital Signs with Ranges  Temp:  [98  F (36.7  C)-99.3  F (37.4  C)] 98  F (36.7  C)  Pulse:  [60-87] 87  Resp:  [18-42] 27  BP: ()/(59-84) 118/76  SpO2:  [93 %-99 %] 98 %  I/O last 3 completed shifts:  In: 2230 [P.O.:60; I.V.:100; NG/GT:705]  Out: 2740 [Urine:1900; Stool:840]    Constitutional: No acute distress.  Awake and alert.  Answers appropriately.  HEENT: Normocephalic.  Trach in place.  Trach site appears clean.  Respiratory: Moving air bilaterally.   No wheeze.   Cardiovascular: Nontachycardic, irregular, no murmur  GI: Ostomy in place with loose stool.  Bowel sounds appreciated.  Skin/Integumen: WWP, no rash. No edema  Neuro: Awake.  Answers appropriately.  Moves all extremities.     Medications     dextrose Stopped (07/14/23 1400)     lactated ringers Stopped (06/28/23 0011)     - MEDICATION INSTRUCTIONS -         amiodarone  200 mg Oral or Feeding Tube Daily     apixaban ANTICOAGULANT  5 mg Per Feeding Tube BID     atorvastatin  20 mg Per Feeding Tube Daily     budesonide  0.5 mg Nebulization BID     diltiazem  60 mg Oral Q6H [facility]     ipratropium  0.5 mg Nebulization 4x daily     levalbuterol  0.63 mg Nebulization 4x Daily     levothyroxine  112  mcg Per Feeding Tube Daily     magnesium oxide  400 mg Oral or Feeding Tube Daily     menthol   Transdermal Q8H     multivitamins w/minerals  15 mL Per Feeding Tube Daily     nicotine  1 patch Transdermal Daily     nicotine   Transdermal Q8H     PARoxetine  20 mg Per Feeding Tube Daily     protein modular  1 packet Per Feeding Tube Daily     sodium chloride (PF)  10-40 mL Intracatheter Q8H       Data   Recent Labs   Lab 07/17/23  0821 07/17/23  0412 07/16/23  2333 07/16/23  1248 07/16/23  1242 07/16/23  0839 07/16/23  0558 07/15/23  0542 07/15/23  0540 07/12/23  0734 07/12/23  0443   WBC  --  5.0  --   --   --   --  4.5  --  4.8   < > 6.9   HGB  --  8.5*  --   --   --   --  8.3*  --  8.7*   < > 8.6*   MCV  --  99  --   --   --   --  100  --  98   < > 100   PLT  --  209  --   --   --   --  212  --  201   < > 181   NA  --  137  --   --   --   --  136  --  135*   < > 138   POTASSIUM  --  3.7  --   --  3.6  --  3.4  --  3.8   < > 3.1*   CHLORIDE  --  104  --   --   --   --  104  --  104   < > 101   CO2  --  24  --   --   --   --  24  --  25   < > 27   BUN  --  9.3  --   --   --   --  7.9*  --  5.9*   < > 12.8   CR  --  0.41*  --   --   --   --  0.41*  --  0.48*   < > 0.46*   ANIONGAP  --  9  --   --   --   --  8  --  6*   < > 10   EDIN  --  8.0*  --   --   --   --  7.9*  --  8.0*   < > 8.3*   * 114* 119*   < >  --    < > 126*   < > 111*   < > 94   ALBUMIN  --   --   --   --   --   --   --   --   --   --  2.8*   PROTTOTAL  --   --   --   --   --   --   --   --   --   --  5.6*   BILITOTAL  --   --   --   --   --   --   --   --   --   --  0.5   ALKPHOS  --   --   --   --   --   --   --   --   --   --  82   ALT  --   --   --   --   --   --   --   --   --   --  53*   AST  --   --   --   --   --   --   --   --   --   --  28    < > = values in this interval not displayed.       No results found for this or any previous visit (from the past 24 hour(s)).

## 2023-07-17 NOTE — PROGRESS NOTES
57-year-old female with history of COPD on home O2, tobacco dependence, chronically anticoagulated for atrial fibrillation admitted for shortness of breath and wheezing.  The patient had a protracted course at [facility] secondary to respiratory failure secondary to community-acquired pneumonia, bronchospasm and COPD.  She at times had a high airway pressures requiring deep sternal radiation and paralysis.  This was also complicated by pneumothorax requiring chest tube.  She had multiple episodes of atrial fibrillation with RVR requiring cardioversion, diltiazem and amiodarone.  She was continued on aggressive treatment plan of steroids and nebs and mechanical ventilation and she was eventually trached for prolonged intubation.  Over the course the last few days she has progressed well being able to be liberated from mechanical ventilation and hydrate.  She did develop a large bowel obstruction which was required resection and an ostomy.  This morning when I saw her she was complaining about some abdominal distention but otherwise had no other significant issues.  Temp: 98  F (36.7  C) Temp src: Temporal BP: 118/76 Pulse: 75   Resp: 24 SpO2: 98 % O2 Device: None (Room air)     Pleasant female lying in bed in no acute distress  Neck is supple, trach clean dry intact with cap in place, cuff is down  Chest is clear to auscultation bilaterally  CV is irregular rate and rhythm  Abdomen mild distention but otherwise soft nontender  Extremities are warm and well-perfused  Lab Results   Component Value Date    HGB 8.5 (L) 07/17/2023     Assessment and plan 57-year-old female with history of CABG, atrial fibrillation and large bowel obstruction progressing as expected.  From a respiratory standpoint she has made tremendous improvement and she is now liberated from mechanical ventilation, her trach is capped, her chest is clear, continue her current regiment for bronchospasm, COPD and resolved pneumonia.  Patient will benefit  from aggressive bronchial hygiene at this time.  Given the patient's history of atrial fibrillation continue on amiodarone along with diltiazem.  We are currently holding anticoagulation given her recent surgery.  She is tolerating her diverting colostomy secondary to large bowel obstruction.  She is tolerating tube feeds.  Would consider advancing diet as tolerated.  May need speech-language consultation prior to advancing diet.  Electrolytes and renal's function are stable.  Continue to monitor the patient in ICU at this time.  Will need discharge planning.    Jn Madrigal MD  Critical Care Medicine

## 2023-07-17 NOTE — PROGRESS NOTES
COLON & RECTAL SURGERY  PROGRESS NOTE    July 17, 2023  Post-op Day # 5    SUBJECTIVE:  Patient tolerating tube feeds. Denies abdominal pain, nausea, and vomiting. Stoma functioning.     OBJECTIVE:  Temp:  [98.2  F (36.8  C)-99.3  F (37.4  C)] 98.4  F (36.9  C)  Pulse:  [60-81] 71  Resp:  [20-42] 23  BP: ()/(59-84) 118/69  SpO2:  [93 %-99 %] 98 %    Intake/Output Summary (Last 24 hours) at 7/17/2023 1113  Last data filed at 7/17/2023 0600  Gross per 24 hour   Intake 1735 ml   Output 2040 ml   Net -305 ml       GENERAL:  Awake, alert, no acute distress, lying in bed  HEAD: Nomocephalic atraumatic  SCLERA: anicteric  EXTREMITIES: warm and well perfused  ABDOMEN:  Soft, appropriately tender, non-distended, no rebound or guarding, no peritoneal signs. Stoma viable with stool in the bag. RRC bridge in place.   INCISION:  C/d/i    LABS:  Lab Results   Component Value Date    WBC 5.0 07/17/2023    WBC 6.2 02/02/2006     Lab Results   Component Value Date    HGB 8.5 07/17/2023    HGB 10.8 02/02/2006     Lab Results   Component Value Date    HCT 26.8 07/17/2023    HCT 30.4 02/02/2006     Lab Results   Component Value Date     07/17/2023     02/02/2006     Last Basic Metabolic Panel:  Lab Results   Component Value Date     07/17/2023     02/02/2006      Lab Results   Component Value Date    POTASSIUM 3.7 07/17/2023    POTASSIUM 5.1 06/21/2023    POTASSIUM 4.5 05/17/2022    POTASSIUM 3.4 02/02/2006     Lab Results   Component Value Date    CHLORIDE 104 07/17/2023    CHLORIDE 99 06/21/2023    CHLORIDE 103 05/17/2022    CHLORIDE 109 02/02/2006     Lab Results   Component Value Date    EDIN 8.0 07/17/2023    EDIN 7.8 02/02/2006     Lab Results   Component Value Date    CO2 24 07/17/2023    CO2 29 05/17/2022    CO2 17 02/02/2006     Lab Results   Component Value Date    BUN 9.3 07/17/2023    BUN 7 06/21/2023    BUN 17 05/17/2022    BUN <2 02/02/2006     Lab Results   Component Value Date    CR 0.41  07/17/2023    CR 0.80 02/02/2006     Lab Results   Component Value Date     07/17/2023     07/17/2023     05/17/2022    GLC 90 02/02/2006       ASSESSMENT/PLAN:  56 y/o woman admitted since 6/20 with respiratory failure, s/p trach and hospital course c/b LBO POD#5 s/p diverting loop transverse colostomy    - Continue tube feeds  - Will remove RRC bridge today  - Pain management as needed  - Stoma cares and teaching  - [facility]  - Supportive cares per ICU team    For questions/paging, please contact the CRS office at [phone].    Nicole Kiran PA-C  [facility]  Phone: [phone]    Colon and Rectal Surgery Attending Note    Patient seen and examined independently.  Agree with above assessment and plan.  Ewa is a 57-year-old woman who was admitted with respiratory distress, who had a pneumothorax, A-fib, and significant pulmonary compromise requiring a trach.  She was found to have a sigmoid colon obstruction and underwent a transverse loop colostomy 5 days ago.  Since surgery she has been able to resume her tube feeds.  She is up out of bed today and is able to talk with her trach capped.  She denies abdominal pain.  She is having considerable loose stoma output without bleeding.  Abdomen: Soft, nontender, nondistended.  The incision is under the stoma appliance.  The red catheter bridge remains in place.  This was not removed as she was sitting up in the chair.    Plan:  Continue tube feeds, and diet as permitted per speech-language pathology.  Remove the red rubber catheter with stoma cares.  On Eliquis now for VTE prophylaxis and for A-fib.  Okay to discharge from surgical point of view when appropriate location found.    Laurita Marques MD  [facility]  [address], [address]  [place] [address]  T: [phone]  F: [phone]   [email or web address]

## 2023-07-17 NOTE — PLAN OF CARE
Patient alert and oriented. Tolerating tube feeds.  Up to bedside commode with lift for voiding. Stool per colostomy.  Started on clear liquid diet. Continues to work with therapy. Trach remains capped  Advanced diet per speech therapy.  Discharge plan to be determined, awaiting social work consult for placement

## 2023-07-17 NOTE — PLAN OF CARE
Tmax 99.3. Alert and orientated. Patient was SR this shift, respiratory LS diminished. Trach has speaking valve and capped on room air. : Up to commode several times with great output. Received Lasix X 1 to help remove fluid. Good output from cholostomy bag and less stool from rectum. Patient worked with PT and OT was able to stand with delfin steady. Still generalized edema however improving.    Per patients request did receive 1 x order for ClonazaPam for this evening. 0.5mg. VRB with Dr. Veliz.    Plan to go to LTC facility, waiting for placement.

## 2023-07-17 NOTE — PLAN OF CARE
ICU End of Shift Summary.  For vital signs and complete assessments, please see documentation flowsheets.      Pertinent assessments: Pt able to get some good sleep tonight per her report. C/o pain of 5/10 in bilateral legs. Given Tylenol and Rooke boots placed with improvement. Up with the lift to the commode and tolerated well. LSs remain diminished throughout. Able to cough up small amounts of pale, yellow sputum. Denies any shortness of breath with trach capped and speaking valve in place. Sats in the mid to high 90s all shift. Tele SR. Voiding well. Only small amounts of mucousy stool per rectum. Colostomy intact with 190 mL of output. Weight down 2.5 kg from yesterday.  Major Shift Events: K and Mg replaced with recheck tomorrow morning.  Plan (Upcoming Events): Continue to increase activity as tolerated with PT/OT. SP to see today to address eating.   Discharge/Transfer Needs: TBD. Continue ICU cares at this time.     Bedside Shift Report Completed   Bedside Safety Check Completed    Goal Outcome Evaluation:      Plan of Care Reviewed With: patient    Overall Patient Progress: improvingOverall Patient Progress: improving

## 2023-07-17 NOTE — PROGRESS NOTES
"         [facility]  Respiratory Care Note    Pt's trach remained capped throughout the day. Trach care was done x 1 (Dressing changed, inner cannula changed and site cleaned)  She continues to be on RA and is tolerating it well. Will continue to monitor and assess the pt's current respiratory status and needs.     Vital signs:  Temp: 98  F (36.7  C) Temp src: Temporal BP: 134/74 Pulse: 76   Resp: 30 SpO2: 98 % O2 Device: None (Room air) Oxygen Delivery: 25 LPM Height: 160 cm (5' 3\") Weight: 65.5 kg (144 lb 6.4 oz)  Estimated body mass index is 25.58 kg/m  as calculated from the following:    Height as of this encounter: 1.6 m (5' 3\").    Weight as of this encounter: 65.5 kg (144 lb 6.4 oz).      Past Medical History:   Diagnosis Date     Anxiety      COPD (chronic obstructive pulmonary disease) - 2L home O2      Diverticulitis of colon      Hypercholesterolemia      Hypertension      Hypothyroidism      Infection due to 2019 novel coronavirus 5/14/2022     Paroxysmal atrial fibrillation      Psoriasis      Pulmonary nodule - left upper lobe        Past Surgical History:   Procedure Laterality Date     CHOLECYSTECTOMY       COLOSTOMY N/A 7/12/2023    Procedure: OPENING DIVERTING COLOSTOMY;  Surgeon: Jaspal Rashid MD;  Location: [facility] OR     INCISION AND DRAINAGE MANDIBLE, COMBINED Left 01/10/2022    Procedure: INCISION AND DRAINAGE, MANDIBLE;  Surgeon: Jn Feliz DDS;  Location: [facility] OR     INCISION AND DRAINAGE MANDIBLE, COMBINED Left 02/04/2022    Procedure: INCISION AND DRAINAGE, MANDIBLE;  Surgeon: Taylor Ortez DDS;  Location: [facility] OR     TOOTH EXTRACTION       Vocal Cord surgery         Family History   Problem Relation Age of Onset     Deep Vein Thrombosis (DVT) Mother      Hypertension Mother        Social History     Tobacco Use     Smoking status: Never     Smokeless tobacco: Never   Substance Use Topics     Alcohol use: Yes     Comment: [facility]  7/17/2023

## 2023-07-17 NOTE — DISCHARGE INSTRUCTIONS
[facility]  WO Nurse Discharge Instructions for New Colostomy      When you get home     - Upon arrival home, call the [facility] Nurses to schedule an outpatient follow up appoint in a few weeks after discharge from hospital.  The appointment is to assess pouching plan, organize supply ordering, etc.   Call the WO office at   [facility]      [phone]    - Supplies- Upon discharge from the hospital you will be sent home with ostomy supplies.      If you have been set up for home care visits the home home care nurse will help you coordinate ordering supplies.    If you have not been set up for home care then make sure to make a follow up appointment with the WOCN nurse to reassess your abdomen and ostomy for changes and determine the correct plan.  At that time ordering supplies will be discussed.       Pouching     1. Change appliance 2x / wk and as needed for any leakage.  Notify the [facility] Nurse if needing to change pouch more than daily or if skin is itchy.   2. Empty pouch when no more than 1/3 to half full (solid, liquid, gas)  3. May shower with pouch on or off, removing pouch/ barrier down to the skin is ok. Just note that you cannot control output so there may occasionally be clean up if you choose to shower / bathe without a pouch on.     The pouching procedure:   1.  Use the  Pouch Change Instruction  sheet to gather and organize supplies  2.  Trace old pattern onto new pouch and cut out new opening on new barrier.  3.  Remove old pouch, observe for any leakage  4.  Clean skin with water and paper towel   5.  Apply Ring around stoma  6.   Apply prepared barrier to the clean skin and press into place.  7.  Hold hand on pouch/ over stoma to warm pouch into place for 2-5 minutes      Your Pouching Plan / Supplies                       NOTE:  Once your supply company has been determined call your supply company with supply issues    Supplies:  (If using home care ask your  home care nurse to help you order supplies.  Your product plan may change over time, as your stoma and/or your abdomen changes).      [employer] 1-pc system (20 pouch changes/month)   Pouch: Sensura Xpro Flat CTF flat 1pc with integrated filter drainable pouch   -  #15782 (10/box = 2 box/month)      2.  Ring:  Brava Strip paste #[id number] or #[id number] moldable ring (10/box = 2 box/month)  3.  3M Cavilon no-sting skin barrier (optional if needed)   -    #8164  (50/box = 1 box as needed)  4.  Stoma powder (if needed)   -   #7906  (1 bottle as needed)  5.  Odor eliminator & lubricant  -    #41456 (8oz bottle) or #14787 (8ml packets, 50 in a box)  (1 as needed)  6.  Wallace adhesive remover spray (optional)   -    #345465  (1/box = 1 box as needed)    7. Brava Elastic Barrier Strips (optional) #115495   Activity     Walk short distances & increase as your strength allows  You may climb stairs  Do not do strenuous exercise or activity such as golfing or heavy lifting  Do not drive until approved by your doctor  Carry an extra pouching system with you or in your car, be prepared  Bathing:  You may shower with your pouch on or if a pouch change day may remove pouch and shower without a pouch on.      Diet and Hydration     Consume foods throughout the day that will help to thicken output and therefore help maintain a good seal.  Consider these foods:  Grains such as pasta, rice, soda crackers ,pretzels, cheese and yogurt, applesauce, bananas, potatoes, peanut butter and tapioca  Some foods increase/ thin your output  Sugars  Prunes, raisins   Eat 3-4 servings of protein per day  No timed release medications  Want to avoid a blockage and dehydration  1.  Avoiding a blockage  Eat 6 small meals /day. Small meals, small bites.   Avoid raw vegetables, seeds, nuts peels, grapefruit, oranges, pineapple. Watch out for peels, and tough meats like jerkies and casings.   If the food cannot be cut easily with a fork avoid for now.  IF you  have a blockage try to gently massage blockage, do NOT take a laxative and call MD.     2. Avoiding dehydration  Drink plenty of fluids, minimally  eight glasses of water per day  plus if you empty your pouch, have another glass of fluid  Remember caffeine and alcohol makes you more dehydrated, be sure to add in more fluids if consuming caffeine or alcohol  Monitor the color of your urine, if getting too dark, need more fluids  Remember to keep your input/ out put in balance and notify MD if out of balance   If you feel you are getting dehydrated then consider drinking Gatorade, Pedialyte, type of beverage     Follow up     1. Physician - follow instructions from MD for follow ups with them  Contact your physician if you have the following signs or symptoms:  Pain in your incision that is not relieved by a short rest or ordered pain medications  Chills or temperature of 101 or higher  Redness or swelling in your incision    2. WOC Nurses (ostomy nurses)        When you get home make an outpatient appointment with the WOCN nurses to assure your pouching plan is still a good plan, etc  [facility] [phone]  [facility] appointments clinic is located in the [address] just east of the [facility] in [address].      Perineal candidiasis BID and prn until resolves then prn   Cleanse with perineal cleanser and max dry cloths  Apply brava antifungal barrier cream

## 2023-07-18 ENCOUNTER — APPOINTMENT (OUTPATIENT)
Dept: SPEECH THERAPY | Facility: CLINIC | Age: 58
End: 2023-07-18
Payer: COMMERCIAL

## 2023-07-18 ENCOUNTER — APPOINTMENT (OUTPATIENT)
Dept: PHYSICAL THERAPY | Facility: CLINIC | Age: 58
End: 2023-07-18
Payer: COMMERCIAL

## 2023-07-18 LAB
ANION GAP SERPL CALCULATED.3IONS-SCNC: 9 MMOL/L (ref 7–15)
BUN SERPL-MCNC: 7.8 MG/DL (ref 8–23)
CALCIUM SERPL-MCNC: 8.2 MG/DL (ref 8.6–10)
CHLORIDE SERPL-SCNC: 103 MMOL/L (ref 98–107)
CREAT SERPL-MCNC: 0.42 MG/DL (ref 0.51–0.95)
DEPRECATED HCO3 PLAS-SCNC: 23 MMOL/L (ref 22–29)
ERYTHROCYTE [DISTWIDTH] IN BLOOD BY AUTOMATED COUNT: 15.8 % (ref 10–15)
GFR SERPL CREATININE-BSD FRML MDRD: >90 ML/MIN/1.73M2
GLUCOSE BLDC GLUCOMTR-MCNC: 104 MG/DL (ref 70–99)
GLUCOSE BLDC GLUCOMTR-MCNC: 125 MG/DL (ref 70–99)
GLUCOSE SERPL-MCNC: 121 MG/DL (ref 70–99)
HCT VFR BLD AUTO: 27.7 % (ref 35–47)
HGB BLD-MCNC: 8.8 G/DL (ref 11.7–15.7)
MAGNESIUM SERPL-MCNC: 1.8 MG/DL (ref 1.7–2.3)
MCH RBC QN AUTO: 31.2 PG (ref 26.5–33)
MCHC RBC AUTO-ENTMCNC: 31.8 G/DL (ref 31.5–36.5)
MCV RBC AUTO: 98 FL (ref 78–100)
PHOSPHATE SERPL-MCNC: 2.8 MG/DL (ref 2.5–4.5)
PLATELET # BLD AUTO: 208 10E3/UL (ref 150–450)
POTASSIUM SERPL-SCNC: 3.5 MMOL/L (ref 3.4–5.3)
RBC # BLD AUTO: 2.82 10E6/UL (ref 3.8–5.2)
SODIUM SERPL-SCNC: 135 MMOL/L (ref 136–145)
WBC # BLD AUTO: 4.7 10E3/UL (ref 4–11)

## 2023-07-18 PROCEDURE — 250N000013 HC RX MED GY IP 250 OP 250 PS 637: Performed by: INTERNAL MEDICINE

## 2023-07-18 PROCEDURE — 250N000013 HC RX MED GY IP 250 OP 250 PS 637: Performed by: SURGERY

## 2023-07-18 PROCEDURE — 85041 AUTOMATED RBC COUNT: CPT | Performed by: INTERNAL MEDICINE

## 2023-07-18 PROCEDURE — 80048 BASIC METABOLIC PNL TOTAL CA: CPT | Performed by: INTERNAL MEDICINE

## 2023-07-18 PROCEDURE — 250N000009 HC RX 250: Performed by: INTERNAL MEDICINE

## 2023-07-18 PROCEDURE — G0463 HOSPITAL OUTPT CLINIC VISIT: HCPCS

## 2023-07-18 PROCEDURE — 92526 ORAL FUNCTION THERAPY: CPT | Mod: GN

## 2023-07-18 PROCEDURE — 97530 THERAPEUTIC ACTIVITIES: CPT | Mod: GP

## 2023-07-18 PROCEDURE — 999N000127 HC STATISTIC PERIPHERAL IV START W US GUIDANCE

## 2023-07-18 PROCEDURE — 200N000001 HC R&B ICU

## 2023-07-18 PROCEDURE — 94640 AIRWAY INHALATION TREATMENT: CPT | Mod: 76

## 2023-07-18 PROCEDURE — 250N000013 HC RX MED GY IP 250 OP 250 PS 637: Performed by: HOSPITALIST

## 2023-07-18 PROCEDURE — 250N000011 HC RX IP 250 OP 636: Mod: JZ | Performed by: HOSPITALIST

## 2023-07-18 PROCEDURE — 83735 ASSAY OF MAGNESIUM: CPT | Performed by: HOSPITALIST

## 2023-07-18 PROCEDURE — 94640 AIRWAY INHALATION TREATMENT: CPT

## 2023-07-18 PROCEDURE — 99232 SBSQ HOSP IP/OBS MODERATE 35: CPT | Performed by: HOSPITALIST

## 2023-07-18 PROCEDURE — 999N000157 HC STATISTIC RCP TIME EA 10 MIN

## 2023-07-18 PROCEDURE — 84100 ASSAY OF PHOSPHORUS: CPT | Performed by: HOSPITALIST

## 2023-07-18 RX ORDER — LIDOCAINE 40 MG/G
CREAM TOPICAL
Status: DISCONTINUED | OUTPATIENT
Start: 2023-07-18 | End: 2023-07-28 | Stop reason: HOSPADM

## 2023-07-18 RX ORDER — POTASSIUM CHLORIDE 20MEQ/15ML
20 LIQUID (ML) ORAL ONCE
Status: COMPLETED | OUTPATIENT
Start: 2023-07-18 | End: 2023-07-18

## 2023-07-18 RX ORDER — BUDESONIDE 0.5 MG/2ML
0.5 INHALANT ORAL 2 TIMES DAILY
DISCHARGE
Start: 2023-07-18 | End: 2023-07-28

## 2023-07-18 RX ORDER — SODIUM CHLORIDE FOR INHALATION 3 %
3 VIAL, NEBULIZER (ML) INHALATION
DISCHARGE
Start: 2023-07-18 | End: 2023-10-03

## 2023-07-18 RX ORDER — AMIODARONE HYDROCHLORIDE 200 MG/1
200 TABLET ORAL DAILY
Status: ON HOLD | DISCHARGE
Start: 2023-07-19 | End: 2024-02-14

## 2023-07-18 RX ORDER — MAGNESIUM OXIDE 400 MG/1
400 TABLET ORAL DAILY
Status: ON HOLD | DISCHARGE
Start: 2023-07-19 | End: 2023-12-28

## 2023-07-18 RX ORDER — MAGNESIUM SULFATE HEPTAHYDRATE 40 MG/ML
2 INJECTION, SOLUTION INTRAVENOUS ONCE
Status: COMPLETED | OUTPATIENT
Start: 2023-07-18 | End: 2023-07-18

## 2023-07-18 RX ADMIN — ACETAMINOPHEN 650 MG: 325 SOLUTION ORAL at 17:51

## 2023-07-18 RX ADMIN — IPRATROPIUM BROMIDE 0.5 MG: 0.5 SOLUTION RESPIRATORY (INHALATION) at 07:48

## 2023-07-18 RX ADMIN — APIXABAN 5 MG: 5 TABLET, FILM COATED ORAL at 08:52

## 2023-07-18 RX ADMIN — Medication 15 ML: at 08:54

## 2023-07-18 RX ADMIN — ACETAMINOPHEN 650 MG: 325 SOLUTION ORAL at 08:54

## 2023-07-18 RX ADMIN — DILTIAZEM HYDROCHLORIDE 60 MG: 30 TABLET, FILM COATED ORAL at 11:26

## 2023-07-18 RX ADMIN — BUDESONIDE 0.5 MG: 0.5 INHALANT ORAL at 19:56

## 2023-07-18 RX ADMIN — MAGNESIUM OXIDE TAB 400 MG (241.3 MG ELEMENTAL MG) 400 MG: 400 (241.3 MG) TAB at 08:53

## 2023-07-18 RX ADMIN — CLONAZEPAM 0.5 MG: 0.5 TABLET ORAL at 08:50

## 2023-07-18 RX ADMIN — DILTIAZEM HYDROCHLORIDE 60 MG: 30 TABLET, FILM COATED ORAL at 06:17

## 2023-07-18 RX ADMIN — PAROXETINE 20 MG: 10 SUSPENSION ORAL at 08:54

## 2023-07-18 RX ADMIN — MAGNESIUM SULFATE HEPTAHYDRATE 2 G: 2 INJECTION, SOLUTION INTRAVENOUS at 11:23

## 2023-07-18 RX ADMIN — AMIODARONE HYDROCHLORIDE 200 MG: 200 TABLET ORAL at 08:51

## 2023-07-18 RX ADMIN — POTASSIUM CHLORIDE 20 MEQ: 20 SOLUTION ORAL at 11:26

## 2023-07-18 RX ADMIN — ACETAMINOPHEN 650 MG: 325 SOLUTION ORAL at 03:22

## 2023-07-18 RX ADMIN — LEVALBUTEROL HYDROCHLORIDE 0.63 MG: 0.63 SOLUTION RESPIRATORY (INHALATION) at 07:48

## 2023-07-18 RX ADMIN — ATORVASTATIN CALCIUM 20 MG: 20 TABLET, FILM COATED ORAL at 08:50

## 2023-07-18 RX ADMIN — NICOTINE 1 PATCH: 14 PATCH, EXTENDED RELEASE TRANSDERMAL at 08:58

## 2023-07-18 RX ADMIN — CYCLOBENZAPRINE HYDROCHLORIDE 5 MG: 5 TABLET, FILM COATED ORAL at 17:50

## 2023-07-18 RX ADMIN — CYCLOBENZAPRINE HYDROCHLORIDE 5 MG: 5 TABLET, FILM COATED ORAL at 08:51

## 2023-07-18 RX ADMIN — IPRATROPIUM BROMIDE 0.5 MG: 0.5 SOLUTION RESPIRATORY (INHALATION) at 15:22

## 2023-07-18 RX ADMIN — BUDESONIDE 0.5 MG: 0.5 INHALANT ORAL at 07:48

## 2023-07-18 RX ADMIN — IPRATROPIUM BROMIDE 0.5 MG: 0.5 SOLUTION RESPIRATORY (INHALATION) at 19:56

## 2023-07-18 RX ADMIN — DILTIAZEM HYDROCHLORIDE 60 MG: 30 TABLET, FILM COATED ORAL at 17:50

## 2023-07-18 RX ADMIN — LEVALBUTEROL HYDROCHLORIDE 0.63 MG: 0.63 SOLUTION RESPIRATORY (INHALATION) at 15:22

## 2023-07-18 RX ADMIN — LEVALBUTEROL HYDROCHLORIDE 0.63 MG: 0.63 SOLUTION RESPIRATORY (INHALATION) at 19:56

## 2023-07-18 RX ADMIN — APIXABAN 5 MG: 5 TABLET, FILM COATED ORAL at 21:19

## 2023-07-18 RX ADMIN — LEVOTHYROXINE SODIUM 112 MCG: 0.11 TABLET ORAL at 08:51

## 2023-07-18 ASSESSMENT — ACTIVITIES OF DAILY LIVING (ADL)
ADLS_ACUITY_SCORE: 36

## 2023-07-18 NOTE — CONSULTS
[facility] Nurse Inpatient Assessment     Consulted for: colostomy due to LBO  Pre-operative diagnosis:         Large bowel obstruction  Post-operative diagnosis        Same as pre-operative diagnosis      Patient History (according to provider note(s):      Acute on chronic hypoxemic and hypercapnic respiratory failure  COPD with acute exacerbation: Has had increasing shortness of breath for a few days secondary to the poor air quality this week.  Has had a cough nonproductive sputum.  Started a Z-Bennett 2 days ago and prednisone 40 mg/day for 1 day then 30 mg/day for 2 days.  Increasing shortness of breath, wheezing, and chest tightness despite using nebs frequently.  PTA on Advair and DuoNebs and albuterol nebs as needed.  Has 2 L O2 at home as needed, but usually does not use.  Tachypneic and tachycardic in the ER.  Hypercapnic initially with VBG showing pH 7.24, PCO2 69, PO2 223, bicarb 29.  Repeat VBG on BiPAP is much improved.  Chest x-ray shows hyperinflated lungs without any new infiltrate.  Slight leukocytosis at 11.8, but has been on prednisone and procalcitonin 0.04.  Received 125 mg IV Solu-Medrol in route    Assessment:      Assessment of new end Colostomy:  Diagnosis Pertinent to Stoma: Bowel Obstruction      Surgery Date: 7/12/2023  Surgeon:Dr Jaspal Rashid        [facility]: [facility]  Pouching system in place on assessment today: [place] one piece, cut to fit, flat and barrier ring   Pouch barrier status: intact  Pouch last changed/wear time: 3-4 days  Reason for pouch change today: ostomy education  Effectiveness of current pouching/ supply plan: Effective  Change made with ostomy management today: Yes  Pouching system placed today: [place] one piece, cut to fit, flat and barrier ring   Supplies: at bedside  Last photo: 7/18/23    Stoma location: LUQ  Stoma size: 1 1/4 inches, Red robbinson bridge was removed today leaving 2 significant open areas superior and inferior to  "soma where bridge was located    Stoma appearance: viable, healthy, pink/red, flush  Mucocutaneous junction:  Intact-open areas are not an MCJ separation but rather are where bridge was   Peristomal complication(s): see above  Output: soft, toothpaste consistency and brown  Output volume emptied during visit: 10ml  Abdominal assessment: Soft and Distended  Surgical site(s): open to air  NG still in place? Yes   Pain: denies   Is patient still on a PCA? No    Ostomy education assessment:  Participant of teaching session today: patient  and nurse  Education completed today: Stoma assessment, Pouching system assessment , Refitting of appliance , Adjustment of pouching plan, Pouch change demonstration, Introduction to pouches, Peristomal skin care, Pouch emptying demonstration, Intake and output recording, Fluid and electrolyte balance , Importance of hydration, Low fiber diet  and Odor/flatus management   Educational materials/methods: Verbal and Demonstration  Learning Comprehension:   Psychosocial assessment: alert has tracheostomy unable to talk out loud, can mouth the worse   Patient readiness for education today: observing  Following today's visit: patient  and nurse is able to demonstrate;         1. How to empty their pouch? No        2. How to change their pouch? No        3. How to read and record intake and output correctly? No  Preparation for discharge completed: Placed prescription recommendations in discharge navigator for MD to sign  Preparation for discharge still needed: Ensured patient has extra supplies for discharge  Pt support system on discharge: family and 2 daughter   [facility] recommend home care? Yes if going home  Face to face time: 30 minutes          Treatment Plan:     LUQ Colostomy pouching plan:   Pouching system: ostomy supplies pouches: Michael Flat FECAL (069217)   Accessories used: [facility] ostomy accessories: 2\" Cera Barrier Ring (246102), Powder (667170) and M9 Drops ([id number])   Frequency of " pouch changes: Three times a week  WOC follow up plan: Daily Monday-Friday (as able)  Bedside RN interventions: Change pouch PRN if leaking using the supplies above, Empty pouch when 1/3 to 1/2 full, ensure to clean pouch outlet after emptying to prevent odor, Notify WOC for ongoing pouch leakage and Document stoma appearance and output volume, color, and consistency every shift      Orders: Reviewed    DATA:     Current support surface: Standard  Low air loss (YONAS pump, Isolibrium, Pulsate, skin guard, etc)  Containment of urine/stool: Indwelling catheter and Colostomy pouch  BMI: Body mass index is 25.58 kg/m .   Active diet order: Orders Placed This Encounter      Combination Diet Full Liquid     Output: I/O last 3 completed shifts:  In: 2275 [P.O.:480; NG/GT:300]  Out: 950 [Stool:950]     Labs:   Recent Labs   Lab 07/18/23  0622 07/13/23  0542 07/12/23  0443   ALBUMIN  --   --  2.8*   HGB 8.8*   < > 8.6*   WBC 4.7   < > 6.9    < > = values in this interval not displayed.     Pressure injury risk assessment:   Sensory Perception: 3-->slightly limited  Moisture: 4-->rarely moist  Activity: 2-->chairfast  Mobility: 2-->very limited  Nutrition: 3-->adequate  Friction and Shear: 2-->potential problem  Lee Score: 16    [facility] [employer]  Dept. Office Number: [phone]

## 2023-07-18 NOTE — PROGRESS NOTES
[facility]    Hospitalist Progress Note      Assessment & Plan   Lara Melendez is a 57 year old female with a history of COPD on prn home oxygen 2-3 L/nc, tobacco dependence, htn/hlp, PAF chronically on apixaban, anxiety, hypothyroidism, psoriasis admitted on 6/20/2023 with SOB and wheezing.     Patient has had a protracted admission here at [facility].  Her symptoms reportedly started a few days prior to admission and she called her pulmonologist who initiated empiric azithromycin and prednisone.  Symptoms continued to worsen so EMS was called and she was brought to the ER.  Her respiratory status continued to decline and she required intubation the day after admission.  She has been treated for community-acquired pneumonia along with COPD exacerbation with steroids and antibiotics.  While on the ventilator, patient had significant bronchospasm and high airway pressures requiring deep sedation and paralysis.  [facility] was actually consulted for possibility of ECMO but she was not deemed an appropriate candidate.  She was also found to have a large pneumothorax on 6/26 with chest tube placed by IR which has subsequently been removed (pulled on 7/10).  She has also had recurrent A-fib with RVR requiring both cardioversion and diltiazem with amiodarone.  She was continued on treatment with steroids, nebs, antibiotics and aggressive diuresis.  Paralytics were able to be weaned off but she did require tracheostomy for long-term ventilator weaning.     Patient had worsening abdominal distention on 7/11.  She had a CT scan done that was concerning for large bowel obstruction.  Colorectal surgery was consulted and she underwent surgical repair in the early a.m. of 7/12.     Plan for today:   -Continuing therapies. Speech therapy advanced diet to full liquids  -Discussed decannulation given she has been on room air.  She is still having some secretions per respiratory therapy.  Monitor in the coming 1 to 2  days to determine readiness for decannulation.  If cannot decannulate here then will need to follow-up with Dr. Mackey in clinic for de-cannulation. I'm hopeful we can de-cannulate prior to discharge.  -Pull PICC line.      #Acute on chronic hypoxic and hypercapnic respiratory failure. COPD with acute exacerbation chronically on 2-3 L/nc prn. Left-sided ptx. PNA with sputum + for klebsiella oxytoca. Pulmonary edema 2/2 fluid resuscitation for sepsis status post tracheotomy  Complicated respiratory course-initially intubated due to progressive decline in respiratory function after admission-high airway pressures from severe bronchospasm required deep sedation including paralysis with vec.  She was also treated with 24 hours of continuous albuterol neb.  Lower respiratory track positive for Klebsiella as above along with rhinovirus.  -Patient completed steroid course.  She also completed course of IV antibiotics.  -Chest tube placed by IR on 6/26 for pneumothorax, subsequently removed on 7/10.  -Continue on DuoNebs.  Appreciate pulmonary consultation.  -IV Lasix given prn for volume overload.   -Will need placement for continued recovery. Now looking at acute rehab. Needs to recover from surgical perspective first.  -Speech is working with the patient.  Trach down-sized on 7/15 with better comfort and able to talk more.    -She is on RA on 7/17. Monitor secretions and if doing well then may be [facility]   -Appreciate intensivist assistance  -Droplet precautions     #Ileus. Large bowel obstruction s/p diverting colostomy: First tried bowel meds without improvement.  Patient had worsening abdominal distention and pain.  CT abdomen pelvis done on 7/12 showed colonic dilation with abrupt termination at the mid sigmoid colon with suggestion of pneumatosis.  Colorectal surgery was consulted.  Underwent operative repair early a.m. of 7/12.  Seem to tolerate well.  Appreciate  colorectal surgery for postoperative cares.    -Starting on tube feeds again on 7/14.  -We have been holding some nonessential medications per surgery team recommendations initially.  Reinitiation of tube feeds, these meds have now been resumed.  Tolerating feeds.     #Paroxysmal AF/SVT: Pta on diltiazem and apixaban (BB being avoided due to severe COPD). Required DCCV x 2 during this hospitalization.    -Continue on oral amiodarone along with diltiazem.  Holding DOAC with recent surgery.  Resume once able per colorectal.  Switch to Lovenox for prophylaxis.     #Sepsis. Elevated lactate: Her hospital course has been peppered with episodic sepsis with leukocytosis/tachycardia/fever/respiratory failure etc  -resolved  -elevated lactate due to sepsis, and nebs      #Hypotension: Did require transient phenylephrine support due to deep sedation. Now resolved.      #CAUTI.  Pseudomonas UTI: Hines cath placed for critical illness, UA 6/28 with inflammatory cells with large blood. UCx with  K with pseudomonas  -rec'd 2 days of liberty followed by 4 days of cefepime, abx completed 7/6  -We will keep Hines in place for now given postoperative status.  Likely remove for voiding trial in the coming days.     #Hyperkalemia. Hypokalemia: Potassium bola to 5.5 and persisted in that range for a day or 2.  She received several doses of Lokelma and potassium level has now normalized and actually downtrending.  Unclear etiology.  Renal function is normal.  Her PTA losartan has been on hold.  Replacement as needed.  Monitor.     #Hypernatremia: Significant rise in sodium up to 160 with tube feed initiation.  Improved with high-dose free water via NG and D5, but remained at about 150.  Sodium is now normalized after dose of metolazone and Lasix drip.  Monitoring     #Hypertension pta on dilt and losartan. dilt back on and being titrated. Losartan discontinued in the setting of hyperkalemia     #Anemia  Drifting down during  hospitalization and now at 6.8-otherwise hemodynamically stable. No clear bleeding noted.  Suspect critical illness/venipuncture mediated  -transfused single unit PRBC 7/8     DVT Prophylaxis: Resumed DOAC on 7/16.   Code Status: Full Code  Lines: Remove PICC line on 7/18.  Hines removed.  Peripheral IVs.  Dispo: Working on placement.  LTAC versus acute rehab.     Discussed with intensivist.  Discussed with bedside nurse.    Tommie Ren MD    Interval History   No events.  Patient frustrated by length of stay and still feels generally weak but getting stronger.  Tolerated full liquids with speech.  Still on room air.  Working on placement.  Per respiratory, still having secretions requiring suctioning.    -Data reviewed today: I reviewed all new labs and imaging results over the last 24 hours.     Physical Exam   Temp: 99.1  F (37.3  C) Temp src: Oral BP: 134/81 Pulse: 81   Resp: 24 SpO2: 100 % O2 Device: None (Room air)    Vitals:    07/15/23 0545 07/16/23 0700 07/17/23 0600   Weight: 69.6 kg (153 lb 7 oz) 68.1 kg (150 lb 2.1 oz) 65.5 kg (144 lb 6.4 oz)     Vital Signs with Ranges  Temp:  [97.9  F (36.6  C)-99.1  F (37.3  C)] 99.1  F (37.3  C)  Pulse:  [70-92] 81  Resp:  [18-35] 24  BP: (114-134)/(68-92) 134/81  SpO2:  [94 %-100 %] 100 %  I/O last 3 completed shifts:  In: 1860 [NG/GT:300]  Out: 800 [Stool:800]       Constitutional: No acute distress.  Awake and alert.  Answers appropriately.  HEENT: Normocephalic.  Trach in place.  Trach site appears clean.  Respiratory: Moving air bilaterally.   No wheeze.   Cardiovascular: Nontachycardic, irregular, no murmur  GI: Ostomy in place with loose stool.  Bowel sounds appreciated.  Skin/Integumen: WWP, no rash. No edema  Neuro: Awake.  Answers appropriately.  Moves all extremities.        Medications     dextrose Stopped (07/14/23 1400)     lactated ringers Stopped (06/28/23 0011)     - MEDICATION INSTRUCTIONS -         amiodarone  200 mg Oral or Feeding Tube Daily      apixaban ANTICOAGULANT  5 mg Per Feeding Tube BID     atorvastatin  20 mg Per Feeding Tube Daily     budesonide  0.5 mg Nebulization BID     diltiazem  60 mg Oral Q6H ELZBIETA     ipratropium  0.5 mg Nebulization 4x daily     levalbuterol  0.63 mg Nebulization 4x Daily     levothyroxine  112 mcg Per Feeding Tube Daily     magnesium oxide  400 mg Oral or Feeding Tube Daily     magnesium sulfate  2 g Intravenous Once     menthol   Transdermal Q8H     multivitamins w/minerals  15 mL Per Feeding Tube Daily     nicotine  1 patch Transdermal Daily     nicotine   Transdermal Q8H     PARoxetine  20 mg Per Feeding Tube Daily     protein modular  1 packet Per Feeding Tube Daily     sodium chloride (PF)  10-40 mL Intracatheter Q8H       Data   Recent Labs   Lab 07/18/23  0749 07/18/23  0622 07/17/23 2029 07/17/23  0821 07/17/23  0412 07/16/23  1248 07/16/23  1242 07/16/23  0839 07/16/23  0558 07/12/23  0734 07/12/23  0443   WBC  --  4.7  --   --  5.0  --   --   --  4.5   < > 6.9   HGB  --  8.8*  --   --  8.5*  --   --   --  8.3*   < > 8.6*   MCV  --  98  --   --  99  --   --   --  100   < > 100   PLT  --  208  --   --  209  --   --   --  212   < > 181   NA  --  135*  --   --  137  --   --   --  136   < > 138   POTASSIUM  --  3.5  --   --  3.7  --  3.6  --  3.4   < > 3.1*   CHLORIDE  --  103  --   --  104  --   --   --  104   < > 101   CO2  --  23  --   --  24  --   --   --  24   < > 27   BUN  --  7.8*  --   --  9.3  --   --   --  7.9*   < > 12.8   CR  --  0.42*  --   --  0.41*  --   --   --  0.41*   < > 0.46*   ANIONGAP  --  9  --   --  9  --   --   --  8   < > 10   EDIN  --  8.2*  --   --  8.0*  --   --   --  7.9*   < > 8.3*   * 121* 114*   < > 114*   < >  --    < > 126*   < > 94   ALBUMIN  --   --   --   --   --   --   --   --   --   --  2.8*   PROTTOTAL  --   --   --   --   --   --   --   --   --   --  5.6*   BILITOTAL  --   --   --   --   --   --   --   --   --   --  0.5   ALKPHOS  --   --   --   --   --   --   --   --    --   --  82   ALT  --   --   --   --   --   --   --   --   --   --  53*   AST  --   --   --   --   --   --   --   --   --   --  28    < > = values in this interval not displayed.       No results found for this or any previous visit (from the past 24 hour(s)).

## 2023-07-18 NOTE — PLAN OF CARE
Patient alert and oriented. Tolerating tube feeds. Up in chair most of day.  Red rubber bridge removed from stoma. Electrolytes replaced. Picc removed.   Encourage activity.  Waiting for discharge plan from social work.

## 2023-07-18 NOTE — PLAN OF CARE
ICU End of Shift Summary.  For vital signs and complete assessments, please see documentation flowsheets.      Pertinent assessments: Afebrile. Alert and oriented x 4. Patient c/o generalized back discomfort. No relief from multiple repositions. Pt expressed interest in being in a hot  tub, offered patient heating pad to see if backache would improve with heat. Patient willing to try. After getting patient up to commode and back to bed with heating pad in place patient slept for several hours. Woke up briefly to use commode again and slept again for several more hours. Educated patient on use of heating pad and that it cannot be used over medicated patches, pt verbalized understanding and stated she would prefer the heating pad over the patches as they don't work. Prn apap and flexeril also given with some improvement. Voided x 2. Up to bedside commode with A2 and chair sling. Turned and repositioned as patient allowed.   Major Shift Events: Patient slept most of night.  Plan (Upcoming Events): Continue to search for placement. PT following.   Discharge/Transfer Needs: SW following for placement.     Bedside Shift Report Completed   Bedside Safety Check Completed    Goal Outcome Evaluation:      Plan of Care Reviewed With: patient    Overall Patient Progress: improvingOverall Patient Progress: improving

## 2023-07-18 NOTE — PROGRESS NOTES
Care Management Follow Up    Length of Stay (days): 27    Expected Discharge Date: 07/17/2023     Concerns to be Addressed:       Patient plan of care discussed at interdisciplinary rounds: Yes    Anticipated Discharge Disposition: LTACH     Anticipated Discharge Services:    Anticipated Discharge DME:      Patient/family educated on Medicare website which has current facility and service quality ratings: yes  Education Provided on the Discharge Plan:    Patient/Family in Agreement with the Plan:      Referrals Placed by CM/SW: Post Acute Facilities  Private pay costs discussed: Not applicable    Additional Information:  Received call from LTACH liaison that patient no longer is a candidate for LTACH. Contacted ARU liaison to see if they could review patients chart and give advisement if she meets criteria for them . They will review later today and get back to CM.  Will attempt to update patient and spouse later today.    Bhakti Walters RN BSN OCN  Care Coordinator  [facility]  [phone]

## 2023-07-18 NOTE — PROGRESS NOTES
"         [facility]  Respiratory Care Note    Pt's trach remained capped throughout the day. Trach care was done x 1 (Dressing changed, inner cannula changed and site cleaned) Suctioning out a moderate amount of thick creamy secretions. She continues to be on RA and is tolerating it well. Will continue to monitor and assess the pt's current respiratory status and needs.     Vital signs:  Temp: 98  F (36.7  C) Temp src: Oral BP: 123/69 Pulse: 85   Resp: 24 SpO2: 97 % O2 Device: None (Room air) Oxygen Delivery: 25 LPM Height: 160 cm (5' 3\") Weight: 65.5 kg (144 lb 6.4 oz)  Estimated body mass index is 25.58 kg/m  as calculated from the following:    Height as of this encounter: 1.6 m (5' 3\").    Weight as of this encounter: 65.5 kg (144 lb 6.4 oz).      Jose Luis Ruffin RT  [facility]  7/18/2023       "

## 2023-07-18 NOTE — PROGRESS NOTES
[facility]  PROGRESS NOTE    July 18, 2023  Post-op Day # 6    SUBJECTIVE:  Tolerating tube feeds and some full liquids. Denies any abdominal pain or nausea. Stoma continues to be functioning    OBJECTIVE:  Temp:  [97.9  F (36.6  C)-99.1  F (37.3  C)] (P) 98  F (36.7  C)  Pulse:  [70-92] 92  Resp:  [18-35] 25  BP: ()/(68-92) 113/74  SpO2:  [94 %-100 %] 97 %    Intake/Output Summary (Last 24 hours) at 7/18/2023 1209  Last data filed at 7/18/2023 1100  Gross per 24 hour   Intake 1475 ml   Output 600 ml   Net 875 ml       GENERAL:  Awake, alert, no acute distress, sitting up in her chair  HEAD: Nomocephalic atraumatic  SCLERA: anicteric  EXTREMITIES: warm and well perfused  ABDOMEN:  Soft, non-tender, non-distended, no rebound or guarding, no peritoneal signs. Stoma functioning with loose stool in the bag. RRC bridge still in place.   INCISION:  C/d/i    LABS:  Lab Results   Component Value Date    WBC 4.7 07/18/2023    WBC 6.2 02/02/2006     Lab Results   Component Value Date    HGB 8.8 07/18/2023    HGB 10.8 02/02/2006     Lab Results   Component Value Date    HCT 27.7 07/18/2023    HCT 30.4 02/02/2006     Lab Results   Component Value Date     07/18/2023     02/02/2006     Last Basic Metabolic Panel:  Lab Results   Component Value Date     07/18/2023     02/02/2006      Lab Results   Component Value Date    POTASSIUM 3.5 07/18/2023    POTASSIUM 5.1 06/21/2023    POTASSIUM 4.5 05/17/2022    POTASSIUM 3.4 02/02/2006     Lab Results   Component Value Date    CHLORIDE 103 07/18/2023    CHLORIDE 99 06/21/2023    CHLORIDE 103 05/17/2022    CHLORIDE 109 02/02/2006     Lab Results   Component Value Date    EDIN 8.2 07/18/2023    EDIN 7.8 02/02/2006     Lab Results   Component Value Date    CO2 23 07/18/2023    CO2 29 05/17/2022    CO2 17 02/02/2006     Lab Results   Component Value Date    BUN 7.8 07/18/2023    BUN 7 06/21/2023    BUN 17 05/17/2022    BUN <2 02/02/2006     Lab  Results   Component Value Date    CR 0.42 07/18/2023    CR 0.80 02/02/2006     Lab Results   Component Value Date     07/18/2023     07/18/2023     05/17/2022    GLC 90 02/02/2006       ASSESSMENT/PLAN:   58 y/o woman admitted since 6/20 with respiratory failure, s/p trach and hospital course c/b LBO POD#6 s/p diverting loop transverse colostomy     - Continue tube feeds. Further diet per speech.   - Remove RRC with stoma cares please  - Pain management as needed  - Stoma cares and teaching  - Transitioned to Eliquis for ppx and A-Fib  - Supportive cares per ICU team    For questions/paging, please contact the CRS office at [phone].    Nicole Kiran PA-C  [facility]  Phone: [phone]      [facility] Attending Note    Patient seen and examined independently.  Agree with above assessment and plan.  Up in the chair, tolerating TF and clears. Stoma functioning  Abd soft, stoma pink and productive with RRC removed  Plan as above.   Awaiting placement.  Advance diet per SLP  Will follow peripherally, call if questions or concerns arise.    Laurita Marques MD  [facility]  [address], [address]  [place] [address]  T: [phone]  F: [phone]   [email or web address]

## 2023-07-19 ENCOUNTER — APPOINTMENT (OUTPATIENT)
Dept: SPEECH THERAPY | Facility: CLINIC | Age: 58
End: 2023-07-19
Payer: COMMERCIAL

## 2023-07-19 ENCOUNTER — APPOINTMENT (OUTPATIENT)
Dept: OCCUPATIONAL THERAPY | Facility: CLINIC | Age: 58
End: 2023-07-19
Payer: COMMERCIAL

## 2023-07-19 ENCOUNTER — APPOINTMENT (OUTPATIENT)
Dept: PHYSICAL THERAPY | Facility: CLINIC | Age: 58
End: 2023-07-19
Payer: COMMERCIAL

## 2023-07-19 LAB
ANION GAP SERPL CALCULATED.3IONS-SCNC: 8 MMOL/L (ref 7–15)
BUN SERPL-MCNC: 9 MG/DL (ref 8–23)
CALCIUM SERPL-MCNC: 8.4 MG/DL (ref 8.6–10)
CHLORIDE SERPL-SCNC: 103 MMOL/L (ref 98–107)
CREAT SERPL-MCNC: 0.39 MG/DL (ref 0.51–0.95)
DEPRECATED HCO3 PLAS-SCNC: 23 MMOL/L (ref 22–29)
ERYTHROCYTE [DISTWIDTH] IN BLOOD BY AUTOMATED COUNT: 15.9 % (ref 10–15)
GFR SERPL CREATININE-BSD FRML MDRD: >90 ML/MIN/1.73M2
GLUCOSE BLDC GLUCOMTR-MCNC: 113 MG/DL (ref 70–99)
GLUCOSE SERPL-MCNC: 144 MG/DL (ref 70–99)
HCT VFR BLD AUTO: 28.5 % (ref 35–47)
HGB BLD-MCNC: 9.3 G/DL (ref 11.7–15.7)
MAGNESIUM SERPL-MCNC: 1.8 MG/DL (ref 1.7–2.3)
MCH RBC QN AUTO: 31.6 PG (ref 26.5–33)
MCHC RBC AUTO-ENTMCNC: 32.6 G/DL (ref 31.5–36.5)
MCV RBC AUTO: 97 FL (ref 78–100)
PHOSPHATE SERPL-MCNC: 3 MG/DL (ref 2.5–4.5)
PLATELET # BLD AUTO: 227 10E3/UL (ref 150–450)
POTASSIUM SERPL-SCNC: 3.7 MMOL/L (ref 3.4–5.3)
RBC # BLD AUTO: 2.94 10E6/UL (ref 3.8–5.2)
SODIUM SERPL-SCNC: 134 MMOL/L (ref 136–145)
WBC # BLD AUTO: 4.8 10E3/UL (ref 4–11)

## 2023-07-19 PROCEDURE — 250N000013 HC RX MED GY IP 250 OP 250 PS 637: Performed by: INTERNAL MEDICINE

## 2023-07-19 PROCEDURE — 99232 SBSQ HOSP IP/OBS MODERATE 35: CPT | Performed by: INTERNAL MEDICINE

## 2023-07-19 PROCEDURE — 92526 ORAL FUNCTION THERAPY: CPT | Mod: GN

## 2023-07-19 PROCEDURE — 94640 AIRWAY INHALATION TREATMENT: CPT | Mod: 76

## 2023-07-19 PROCEDURE — 250N000013 HC RX MED GY IP 250 OP 250 PS 637: Performed by: SURGERY

## 2023-07-19 PROCEDURE — 36415 COLL VENOUS BLD VENIPUNCTURE: CPT | Performed by: INTERNAL MEDICINE

## 2023-07-19 PROCEDURE — 85027 COMPLETE CBC AUTOMATED: CPT | Performed by: INTERNAL MEDICINE

## 2023-07-19 PROCEDURE — 97530 THERAPEUTIC ACTIVITIES: CPT | Mod: GP

## 2023-07-19 PROCEDURE — 250N000009 HC RX 250: Performed by: INTERNAL MEDICINE

## 2023-07-19 PROCEDURE — 80048 BASIC METABOLIC PNL TOTAL CA: CPT | Performed by: INTERNAL MEDICINE

## 2023-07-19 PROCEDURE — 250N000013 HC RX MED GY IP 250 OP 250 PS 637: Performed by: HOSPITALIST

## 2023-07-19 PROCEDURE — 94640 AIRWAY INHALATION TREATMENT: CPT

## 2023-07-19 PROCEDURE — 999N000157 HC STATISTIC RCP TIME EA 10 MIN

## 2023-07-19 PROCEDURE — 250N000011 HC RX IP 250 OP 636: Mod: JZ | Performed by: INTERNAL MEDICINE

## 2023-07-19 PROCEDURE — 83735 ASSAY OF MAGNESIUM: CPT | Performed by: HOSPITALIST

## 2023-07-19 PROCEDURE — G0463 HOSPITAL OUTPT CLINIC VISIT: HCPCS

## 2023-07-19 PROCEDURE — 97110 THERAPEUTIC EXERCISES: CPT | Mod: GP

## 2023-07-19 PROCEDURE — 120N000001 HC R&B MED SURG/OB

## 2023-07-19 PROCEDURE — 84100 ASSAY OF PHOSPHORUS: CPT | Performed by: HOSPITALIST

## 2023-07-19 PROCEDURE — 97110 THERAPEUTIC EXERCISES: CPT | Mod: GO

## 2023-07-19 RX ORDER — MAGNESIUM SULFATE HEPTAHYDRATE 40 MG/ML
2 INJECTION, SOLUTION INTRAVENOUS ONCE
Status: COMPLETED | OUTPATIENT
Start: 2023-07-19 | End: 2023-07-19

## 2023-07-19 RX ORDER — POTASSIUM CHLORIDE 1.5 G/1.58G
20 POWDER, FOR SOLUTION ORAL ONCE
Status: COMPLETED | OUTPATIENT
Start: 2023-07-19 | End: 2023-07-19

## 2023-07-19 RX ADMIN — APIXABAN 5 MG: 5 TABLET, FILM COATED ORAL at 09:14

## 2023-07-19 RX ADMIN — ACETAMINOPHEN 650 MG: 325 SOLUTION ORAL at 22:50

## 2023-07-19 RX ADMIN — DILTIAZEM HYDROCHLORIDE 60 MG: 30 TABLET, FILM COATED ORAL at 13:28

## 2023-07-19 RX ADMIN — Medication 15 ML: at 09:15

## 2023-07-19 RX ADMIN — HYDROXYZINE HYDROCHLORIDE 25 MG: 10 SOLUTION ORAL at 22:50

## 2023-07-19 RX ADMIN — DILTIAZEM HYDROCHLORIDE 60 MG: 30 TABLET, FILM COATED ORAL at 00:21

## 2023-07-19 RX ADMIN — POTASSIUM CHLORIDE FOR ORAL SOLUTION 20 MEQ: 1.5 POWDER, FOR SOLUTION ORAL at 06:10

## 2023-07-19 RX ADMIN — PAROXETINE 20 MG: 10 SUSPENSION ORAL at 09:16

## 2023-07-19 RX ADMIN — AMIODARONE HYDROCHLORIDE 200 MG: 200 TABLET ORAL at 09:14

## 2023-07-19 RX ADMIN — ATORVASTATIN CALCIUM 20 MG: 20 TABLET, FILM COATED ORAL at 09:14

## 2023-07-19 RX ADMIN — IPRATROPIUM BROMIDE 0.5 MG: 0.5 SOLUTION RESPIRATORY (INHALATION) at 07:15

## 2023-07-19 RX ADMIN — BUDESONIDE 0.5 MG: 0.5 INHALANT ORAL at 21:31

## 2023-07-19 RX ADMIN — CLONAZEPAM 0.5 MG: 0.5 TABLET ORAL at 09:15

## 2023-07-19 RX ADMIN — LEVOTHYROXINE SODIUM 112 MCG: 0.11 TABLET ORAL at 09:15

## 2023-07-19 RX ADMIN — ACETAMINOPHEN 650 MG: 325 SOLUTION ORAL at 09:14

## 2023-07-19 RX ADMIN — LEVALBUTEROL HYDROCHLORIDE 0.63 MG: 0.63 SOLUTION RESPIRATORY (INHALATION) at 07:15

## 2023-07-19 RX ADMIN — ACETAMINOPHEN 650 MG: 325 SOLUTION ORAL at 17:25

## 2023-07-19 RX ADMIN — BUDESONIDE 0.5 MG: 0.5 INHALANT ORAL at 07:15

## 2023-07-19 RX ADMIN — MAGNESIUM OXIDE TAB 400 MG (241.3 MG ELEMENTAL MG) 400 MG: 400 (241.3 MG) TAB at 09:15

## 2023-07-19 RX ADMIN — APIXABAN 5 MG: 5 TABLET, FILM COATED ORAL at 22:06

## 2023-07-19 RX ADMIN — DILTIAZEM HYDROCHLORIDE 60 MG: 30 TABLET, FILM COATED ORAL at 06:09

## 2023-07-19 RX ADMIN — IPRATROPIUM BROMIDE 0.5 MG: 0.5 SOLUTION RESPIRATORY (INHALATION) at 15:53

## 2023-07-19 RX ADMIN — LEVALBUTEROL HYDROCHLORIDE 0.63 MG: 0.63 SOLUTION RESPIRATORY (INHALATION) at 11:24

## 2023-07-19 RX ADMIN — LEVALBUTEROL HYDROCHLORIDE 0.63 MG: 0.63 SOLUTION RESPIRATORY (INHALATION) at 21:31

## 2023-07-19 RX ADMIN — NICOTINE 1 PATCH: 14 PATCH, EXTENDED RELEASE TRANSDERMAL at 09:16

## 2023-07-19 RX ADMIN — ACETAMINOPHEN 650 MG: 325 SOLUTION ORAL at 13:29

## 2023-07-19 RX ADMIN — DILTIAZEM HYDROCHLORIDE 60 MG: 30 TABLET, FILM COATED ORAL at 17:25

## 2023-07-19 RX ADMIN — IPRATROPIUM BROMIDE 0.5 MG: 0.5 SOLUTION RESPIRATORY (INHALATION) at 21:31

## 2023-07-19 RX ADMIN — CYCLOBENZAPRINE HYDROCHLORIDE 5 MG: 5 TABLET, FILM COATED ORAL at 13:28

## 2023-07-19 RX ADMIN — ACETAMINOPHEN 650 MG: 325 SOLUTION ORAL at 03:54

## 2023-07-19 RX ADMIN — MAGNESIUM SULFATE HEPTAHYDRATE 2 G: 2 INJECTION, SOLUTION INTRAVENOUS at 06:14

## 2023-07-19 RX ADMIN — LEVALBUTEROL HYDROCHLORIDE 0.63 MG: 0.63 SOLUTION RESPIRATORY (INHALATION) at 15:53

## 2023-07-19 RX ADMIN — IPRATROPIUM BROMIDE 0.5 MG: 0.5 SOLUTION RESPIRATORY (INHALATION) at 11:24

## 2023-07-19 ASSESSMENT — ACTIVITIES OF DAILY LIVING (ADL)
ADLS_ACUITY_SCORE: 31
ADLS_ACUITY_SCORE: 36
ADLS_ACUITY_SCORE: 31
ADLS_ACUITY_SCORE: 36

## 2023-07-19 NOTE — PROGRESS NOTES
[facility]    Medicine Progress Note - Hospitalist Service    Date of Admission:  6/20/2023    Assessment & Plan   Lara Melendez is a 57 year old female with a history of COPD on prn home oxygen 2-3 L/nc, tobacco dependence, htn/hlp, PAF chronically on apixaban, anxiety, hypothyroidism, psoriasis admitted on 6/20/2023 with SOB and wheezing.     Patient has had a protracted admission here at [facility].  Her symptoms reportedly started a few days prior to admission and she called her pulmonologist who initiated empiric azithromycin and prednisone.  Symptoms continued to worsen so EMS was called and she was brought to the ER.  Her respiratory status continued to decline and she required intubation the day after admission.  She has been treated for community-acquired pneumonia along with COPD exacerbation with steroids and antibiotics.  While on the ventilator, patient had significant bronchospasm and high airway pressures requiring deep sedation and paralysis.  [facility] was actually consulted for possibility of ECMO but she was not deemed an appropriate candidate.  She was also found to have a large pneumothorax on 6/26 with chest tube placed by IR which has subsequently been removed (pulled on 7/10).  She has also had recurrent A-fib with RVR requiring both cardioversion and diltiazem with amiodarone.  She was continued on treatment with steroids, nebs, antibiotics and aggressive diuresis.  Paralytics were able to be weaned off but she did require tracheostomy for long-term ventilator weaning.     Patient had worsening abdominal distention on 7/11.  She had a CT scan done that was concerning for large bowel obstruction.  Colorectal surgery was consulted and she underwent surgical repair and colostomy in the early a.m. of 7/12.  Now medically stable and looking into disposition options.       #Acute on chronic hypoxic and hypercapnic respiratory failure. COPD with acute exacerbation chronically on 2-3  L/nc prn. Left-sided ptx. PNA with sputum + for klebsiella oxytoca. Pulmonary edema 2/2 fluid resuscitation for sepsis status post tracheotomy  Complicated respiratory course-initially intubated due to progressive decline in respiratory function after admission-high airway pressures from severe bronchospasm required deep sedation including paralysis with vec.  She was also treated with 24 hours of continuous albuterol neb.  Lower respiratory track positive for Klebsiella as above along with rhinovirus.  -Patient completed steroid course.  She also completed course of IV antibiotics.  -Chest tube placed by IR on 6/26 for pneumothorax, subsequently removed on 7/10.  -Continue on DuoNebs.  Appreciate pulmonary consultation.  -IV Lasix given prn for volume overload.   -Will need placement for continued recovery. Now looking at acute rehab. Needs to recover from surgical perspective first.  -Speech is working with the patient.  Trach down-sized on 7/15 with better comfort and able to talk more.    -She is on RA on 7/17. Monitor secretions and if doing well then may be able to de-cannulate prior to discharge to LTACH.   -Appreciate intensivist assistance  -Droplet precautions  --Discussed decannulation given she has been on room air.  She is still having some secretions per respiratory therapy.  Monitor in the coming 1 to 2 days to determine readiness for decannulation.  If cannot decannulate here then will need to follow-up with Dr. Mackey in clinic for de-cannulation. I'm hopeful we can de-cannulate prior to discharge.     #Ileus. Large bowel obstruction s/p diverting colostomy: First tried bowel meds without improvement.  Patient had worsening abdominal distention and pain.  CT abdomen pelvis done on 7/12 showed colonic dilation with abrupt termination at the mid sigmoid colon with suggestion of pneumatosis.  Colorectal surgery was consulted.  Underwent operative repair early a.m. of 7/12.  Seem to tolerate  well.  Appreciate colorectal surgery for postoperative cares.    -Starting on tube feeds again on 7/14.  -We have been holding some nonessential medications per surgery team recommendations initially.  Reinitiation of tube feeds, these meds have now been resumed.  Tolerating feeds.     #Paroxysmal AF/SVT: Pta on diltiazem and apixaban (BB being avoided due to severe COPD). Required DCCV x 2 during this hospitalization.    -apixiban  -amiodarone     #Sepsis. Elevated lactate: Her hospital course has been peppered with episodic sepsis with leukocytosis/tachycardia/fever/respiratory failure etc  -resolved  -elevated lactate due to sepsis, and nebs      #Hypotension: Did require transient phenylephrine support due to deep sedation. Now resolved.      #CAUTI.  Pseudomonas UTI: Hines cath placed for critical illness, UA 6/28 with inflammatory cells with large blood. UCx with  K with pseudomonas  -rec'd 2 days of liberty followed by 4 days of cefepime, abx completed 7/6  -Hines removed     #Hyperkalemia. Hypokalemia: Potassium bola to 5.5 and persisted in that range for a day or 2.  She received several doses of Lokelma and potassium level has now normalized and actually downtrending.  Unclear etiology.  Renal function is normal.  Her PTA losartan has been on hold.  Replacement as needed.  Monitor.     #Hypernatremia: Significant rise in sodium up to 160 with tube feed initiation.  Improved with high-dose free water via NG and D5, but remained at about 150.  Sodium is now normalized after dose of metolazone and Lasix drip.  Monitoring     #Hypertension pta on dilt and losartan. dilt back on and being titrated. Losartan discontinued in the setting of hyperkalemia     #Anemia  Drifted down to as low as 6.8-otherwise hemodynamically stable. No clear bleeding noted.  Suspect critical illness/venipuncture mediated  -transfused single unit PRBC 7/8  - hgb now 9.3    DVT Prophylaxis: Resumed DOAC on 7/16.   Code Status: Full  "Code  Lines: Remove PICC line on 7/18.  Hines removed.  Peripheral IVs.  Dispo: Working on placement.  LTAC versus acute rehab.  May discharge when placement available.  X tom to medical floor.           Diet: Room Service  Combination Diet Full Liquid  Adult Formula Drip Feeding: Continuous Promote; Nasogastric tube; Goal Rate: 120; mL/hr; From: 7:00 PM; To: 7:00 AM; Initiate TF @ 80ml/hr and if tolerated increase by 10ml every 8 hours  Snacks/Supplements Adult: Magic Cup; Between Meals    Hines Catheter: Not present  Cardiac Monitoring: not needed  Code Status: Full Code      Clinically Significant Risk Factors              # Hypoalbuminemia: Lowest albumin = 2.8 g/dL at 7/12/2023  4:43 AM, will monitor as appropriate     # Hypertension: Noted on problem list        # Overweight: Estimated body mass index is 25.58 kg/m  as calculated from the following:    Height as of this encounter: 1.6 m (5' 3\").    Weight as of this encounter: 65.5 kg (144 lb 6.4 oz).             Quique Rodríguez MD  Hospitalist Service  [facility]  Securely message with Vendor Registry (more info)  Text page via [employer] Paging/Directory   ______________________________________________________________________    Interval History   Feels ok.  Denies pain.  Coughing occasionally.     Physical Exam   Vital Signs: Temp: 98.2  F (36.8  C) Temp src: Temporal BP: 114/74 Pulse: 73   Resp: 22 SpO2: 95 % O2 Device: None (Room air)    Weight: 144 lbs 6.42 oz      Vital signs reviewed  General:  Alert, calm, NAD  CV: regular rate and rhythm, no murmurs or rubs  Lungs:  Clear to ascultation bilaterally, normal respiratory effort  HEENT:  Trach in place  Abdomen:  Soft, nontender, nondistended, no masses, normal bowel sounds, colostomy in place with stool in bag  Extremities:  No edema  Neuro: normal strength and sensation in all 4 extremities, cranial nerves grossly intact  Psychiatric:  Mood and affect within normal limits      Medical " Decision Making             Data   Na 134  Cr 0.39  bg 113  Hgb 9.3

## 2023-07-19 NOTE — PROGRESS NOTES
[facility] Nurse Inpatient Assessment     Consulted for: colostomy due to LBO  Pre-operative diagnosis:         Large bowel obstruction  Post-operative diagnosis        Same as pre-operative diagnosis      Patient History (according to provider note(s):      Acute on chronic hypoxemic and hypercapnic respiratory failure  COPD with acute exacerbation: Has had increasing shortness of breath for a few days secondary to the poor air quality this week.  Has had a cough nonproductive sputum.  Started a Z-Bennett 2 days ago and prednisone 40 mg/day for 1 day then 30 mg/day for 2 days.  Increasing shortness of breath, wheezing, and chest tightness despite using nebs frequently.  PTA on Advair and DuoNebs and albuterol nebs as needed.  Has 2 L O2 at home as needed, but usually does not use.  Tachypneic and tachycardic in the ER.  Hypercapnic initially with VBG showing pH 7.24, PCO2 69, PO2 223, bicarb 29.  Repeat VBG on BiPAP is much improved.  Chest x-ray shows hyperinflated lungs without any new infiltrate.  Slight leukocytosis at 11.8, but has been on prednisone and procalcitonin 0.04.  Received 125 mg IV Solu-Medrol in route    Assessment:      Assessment of new end Colostomy:  Diagnosis Pertinent to Stoma: Bowel Obstruction      Surgery Date: 7/12/2023  Surgeon:Dr Jaspal Rashid        [facility]: [facility]  Pouching system in place on assessment today: [place] one piece, cut to fit, flat and barrier ring   Pouch barrier status: intact  Pouch last changed/wear time: 3-4 days  Reason for pouch change today: pouch not changed today  Effectiveness of current pouching/ supply plan: Effective  Change made with ostomy management today: No  Supplies: at bedside  Last photo: 7/18/23    Stoma location: LUQ  Stoma size: approximately 1 1/4 inches, Red galson bridge was removed 7/18/23 leaving 2 significant open areas superior and inferior to soma where bridge was located    Stoma appearance: viable, healthy,  "pink/red, flush  Mucocutaneous junction:  Intact (on 7/18)-open areas are not an MCJ separation but rather are where bridge was   Peristomal complication(s): see above   Output: soft, toothpaste consistency and brown  Output volume emptied during visit: 10ml  Abdominal assessment: Soft and Distended  Surgical site(s): open to air  NG still in place? Yes   Pain: denies   Is patient still on a PCA? No    Ostomy education assessment:  Participant of teaching session today: patient  and nurse  Education completed today: Stoma assessment, Pouching system assessment , Ostomy accessory product use , Peristomal skin care, Pouch emptying demonstration, Intake and output recording, Fluid and electrolyte balance , Importance of hydration, Low fiber diet , Odor/flatus management , Hernia prevention and Lifestyle adjustments   Educational materials/methods: Verbal and Demonstration  Learning Comprehension:   Psychosocial assessment: alert   Patient readiness for education today: observing and attentive  Following today's visit: patient  and nurse is able to demonstrate;         1. How to empty their pouch? No        2. How to change their pouch? No        3. How to read and record intake and output correctly? No  Preparation for discharge completed: Placed prescription recommendations in discharge navigator for MD to sign  Pt support system on discharge: family and 2 daughter   [facility] recommend home care? Yes if going home  Face to face time: 10 minutes          Treatment Plan:     LUQ Colostomy pouching plan:   Pouching system: ostomy supplies pouches: [place] FECAL (317948)   Accessories used: [facility] ostomy accessories: 2\" Cera Barrier Ring ([id number]), Powder ([id number]) and M9 Drops ([id number])   Frequency of pouch changes: Three times a week  [facility] follow up plan: Daily Monday-Friday (as able)  Bedside RN interventions: Change pouch PRN if leaking using the supplies above, Empty pouch when 1/3 to 1/2 full, ensure to clean pouch " outlet after emptying to prevent odor, Notify WOC for ongoing pouch leakage and Document stoma appearance and output volume, color, and consistency every shift      Orders: Reviewed    DATA:     Current support surface: Standard  Low air loss (YONAS pump, Isolibrium, Pulsate, skin guard, etc)  Containment of urine/stool: Indwelling catheter and Colostomy pouch  BMI: Body mass index is 25.58 kg/m .   Active diet order: Orders Placed This Encounter      Combination Diet Full Liquid     Output: I/O last 3 completed shifts:  In: 2625 [P.O.:480; NG/GT:480]  Out: 925 [Stool:925]     Labs:   Recent Labs   Lab 07/19/23  0516   HGB 9.3*   WBC 4.8     Pressure injury risk assessment:   Sensory Perception: 4-->no impairment  Moisture: 4-->rarely moist  Activity: 2-->chairfast  Mobility: 2-->very limited  Nutrition: 3-->adequate  Friction and Shear: 2-->potential problem  Lee Score: 17    [facility] [employer]  Dept. Office Number: [phone]

## 2023-07-19 NOTE — PROGRESS NOTES
CLINICAL NUTRITION SERVICES - BRIEF NOTE      CURRENT NUTRITION SUPPORT    Access: NJ tube (6/28)    Formula/Rate/Provisions: 120ml/hr x 12 hours w/ 30ml water flushes every 4 hours to provide 1440kcals, 89g protein, 187g CHO, 37g fat, 1208ml free water, 180ml water from flushes for a total of 1388ml fluid daily    Flushes: H2O- 30 ml q 4 hrs    Modulars: Prosource TF20 to 1 pkt/day = 80 kcal/20 g protein    Total energy/protein provisions: 1520 kcals and 109 g protein daily    NEW FINDINGS   Patient discussed during IDT rounds this morning. Nursing finding current order, which was adjusted yesterday, confusing. Also running at very high rate in patient c/o abdominal fullness. Will adjust to more concentrated formula at this time.    Labs: reviewed    Meds: reviewed    ASSESSED NUTRITION NEEDS  Dose weight: 65.5 kg- current weight  Estimated Energy Needs: 7303-7772 kcals/day (25 kcal/kg)  Justification: Vented  Estimated Protein Needs:  grams protein/day (1.2 - 2 grams of pro/kg)  Justification: Hypercatabolism with critical illness  Estimated Fluid Needs: 1925 mL/day (30 mL/kg)   Justification: Maintenance    INTERVENTIONS  Implementation  Enteral Nutrition - Modify composition and rate - change to Osmolite 1.5 @ 80 ml/hr x 12 hrs from 7 pm to 7 am (960 ml), which provides 1440 kcal, 60 g protein, 196 g CHO, 0 g fiber.    +Prosource TF x 2 pkts/day= 160 kcal/40 g protein    Total energy/protein provisions, all sources= 1600 kcal/100 g protein    Monitor diet advancement and intake. Adjust TF prn.    Monitoring/Evaluation  Will continue to monitor and evaluate per protocol.    Danielle Molina, RD, LD, [facility]  Pager - 3rd floor/ICU: [phone]  Pager - All other floors: [phone]  Pager - Weekend/holiday: [phone]  Office: [phone]

## 2023-07-19 NOTE — PROGRESS NOTES
CLINICAL NUTRITION SERVICES - REASSESSMENT NOTE     Nutrition Prescription    RECOMMENDATIONS FOR MDs/PROVIDERS TO ORDER:  None    Malnutrition Status:    Patient does not meet criteria for malnutrition    Recommendations already ordered by Registered Dietitian (RD):  Cycled TF to nocturnal per pt request dt feelings of fullness    Increase TF to 80ml/hr and if tolerated increase by 10ml every 8 hours to goal of 120ml/hr x 12 hours w/ 30ml water flushes every 4 hours to provide 1440kcals, 89g protein, 187g CHO, 37g fat, 1208ml free water, 180ml water from flushes for a total of 1388ml fluid daily    Increased Prosource TF20 to 1 pkt BID dt low BUN and Cr which brings totals to 1520kcals and 109g protein daily    Chocolate Magic cup BID b/n meals    Future/Additional Recommendations:  Adjust flushes pending hydration status  Adjust TF pending PO intake       EVALUATION OF THE PROGRESS TOWARD GOALS   Diet: Full Liquid  Nutrition Support: Pt w/ NJ tube placed 6/28/23. Pt receiving Promote @ 65ml x 22 hrs (held for 1 hr before and after levothyroxine)  Intake: Pt eating 25% of meals per nursing records and received 1491kcals and 30g protein for 3 meals today    Promote providing 1430kcals, 89g protein, 186g CHO, 37g fat, 1200ml free water, 180ml water from flushes for a total of 1380ml fluid daily.     Prosource TF20 brings totals to 1510kcals and 109g protein daily     NEW FINDINGS   Diet advancement per SLP. Pt stated she is pretty picky and feeling too full to eat enough. Pt also stated she is having a hard time reaching her food as it is too far away. Pt requesting assist with feeding. Pt also complaining of sore throat dt feeding tube. Pt stating she is ready to eat eggs and would eat eggs at every meal if she could.     07/17/23 0600 65.5 kg (144 lb 6.4 oz)   07/16/23 0700 68.1 kg (150 lb 2.1 oz)   07/15/23 0545 69.6 kg (153 lb 7 oz)     07/14/23 0400 69.3 kg (152 lb 12.5 oz)   07/13/23 0600 68.5 kg (151 lb 0.2  "oz)   07/11/23 0500 70.6 kg (155 lb 10.3 oz)   07/10/23 0600 69.6 kg (153 lb 7 oz)               07/09/23 0552 69.9 kg (154 lb 1.6 oz)   07/08/23 0445 68.6 kg (151 lb 3.8 oz)   07/07/23 0600 67 kg (147 lb 11.3 oz)   07/06/23 0300 68.1 kg (150 lb 2.1 oz)   07/05/23 0615 68.3 kg (150 lb 9.2 oz)   07/04/23 0630 72.3 kg (159 lb 6.3 oz)   07/03/23 0430 76.1 kg (167 lb 12.3 oz)   07/02/23 0529 80.6 kg (177 lb 11.1 oz)     07/01/23 0448 79.1 kg (174 lb 6.1 oz)   06/30/23 0600 79.1 kg (174 lb 6.1 oz)   06/29/23 0430 78.2 kg (172 lb 6.4 oz)   06/28/23 0556 75.5 kg (166 lb 7.2 oz)   06/27/23 0600 75.3 kg (166 lb 0.1 oz)   06/26/23 0615 76.4 kg (168 lb 6.9 oz)   06/25/23 0530 77 kg (169 lb 12.1 oz)   06/24/23 0600 73.7 kg (162 lb 7.7 oz)   06/23/23 0345 67.7 kg (149 lb 4 oz)   06/21/23 0304 64.5 kg (142 lb 3.2 oz)     Net fluid down 6.4L (14.1lbs) since 7/4/23 per I/Os    ANTHROPOMETRICS  Height: 160 cm (5' 3\")  Most Recent Weight: 79.1 kg (174 lb 6.1 oz)    IBW: 52.4 kg  BMI: Obesity Grade I BMI 30-34.9  Weight History:   Wt Readings from Last 30 Encounters:   07/17/23 65.5 kg (144 lb 6.4 oz)   04/01/23 68.8 kg (151 lb 9.6 oz)   02/11/23 60 kg (132 lb 4.4 oz)   12/02/22 61.3 kg (135 lb 2.3 oz)   09/28/22 63.1 kg (139 lb 1.6 oz)   08/29/22 61.7 kg (136 lb 1.6 oz)   05/17/22 61.2 kg (134 lb 14.4 oz)   04/02/22 58.9 kg (129 lb 12.8 oz)   02/21/22 59 kg (130 lb)   02/04/22 59 kg (130 lb)   02/04/22 59 kg (130 lb)   01/11/22 60.7 kg (133 lb 14.4 oz)       PHYSICAL FINDINGS  See malnutrition section below.  Scattered bruising  Dry skin  Surgical incision    GI CONCERNS  colostomy, LBM 7/18/23 per pt    LABS  Reviewed: Na 135, UN 7.8, Cr 0.42, hgb 8.8, hematocrit 27.7, rbc 2.82, RDW 15.8    MEDICATIONS  Reviewed: synthroid/levothroid, mag-ox, magnesium sulfate, multivitamin w/ minerals-liquid, kayciel, prosource TF20    ASSESSED NUTRITION NEEDS  Dose weight: 65.5 kg  Estimated Energy Needs: 6047-2153 kcals/day (25 " kcal/kg)  Justification: Vented  Estimated Protein Needs:  grams protein/day (1.2 - 2 grams of pro/kg)  Justification: Hypercatabolism with critical illness  Estimated Fluid Needs: 1925 mL/day (30 mL/kg)   Justification: Maintenance    MALNUTRITION:  % Weight Loss:  Weight loss does not meet criteria for malnutrition as it is not significant w/ fluid losses  % Intake:  Decreased intake does not meet criteria for malnutrition as pt is meeting needs w/ TF  Subcutaneous Fat Loss:  None observed  Muscle Loss:  Temporal region moderate depletion, Clavicle bone region mild depletion  Fluid Retention:  None noted    Malnutrition Diagnosis: Patient does not meet two of the above criteria necessary for diagnosing malnutrition    Previous Goals   Total avg nutritional intake to meet a minimum of 20 kcal/kg and 1.2 g PRO/kg daily (per dosing wt 64.5 kg).  Evaluation: Met    Previous Nutrition Diagnosis  Inadequate protein-energy intake related to TF on hold d/t bowel obstruction now s/p diverting colostomy as evidenced by no EN infusion x 3 days    Evaluation: Resolved    CURRENT NUTRITION DIAGNOSIS  Inadequate oral intake related to continuous TF as evidenced by feelings of fullness before meals    INTERVENTIONS  Implementation  Enteral Nutrition - Modify rate, schedule and volume    Goals  Patient to consume % of nutritionally adequate meals three times per day, or the equivalent with supplements/snacks.  Total avg nutritional intake to meet a minimum of 1615 kcal/kg and 98 g PRO/kg daily (per dosing wt 64.5 kg).    Monitoring/Evaluation  Progress toward goals will be monitored and evaluated per protocol. PO intake, supplement tolerance, wt, labs, TF tolerance

## 2023-07-19 NOTE — PLAN OF CARE
Problem: Enteral Nutrition  Goal: Safe, Effective Therapy Delivery  Outcome: Progressing  Goal: Feeding Tolerance  7/19/2023 1428 by Danielle Molina RD  Outcome: Progressing  7/19/2023 1428 by Danielle Molina RD  Reactivated   Goal Outcome Evaluation: adjusted formula to more concentrated product for nocturnal run. Monitor PO intake, adjust EN prn. RASHAD continues to follow closely

## 2023-07-19 NOTE — PLAN OF CARE
ICU End of Shift Summary.  For vital signs and complete assessments, please see documentation flowsheets.      Pertinent assessments: Afebrile. Oriented. No c/o pain except generalized back aches,improved with heating pad and prn apap. Up to commode x 2 with lift and A2. Slept all night between cares.   Major Shift Events:   Plan (Upcoming Events): TF now 7pm-7am. PT following. Encourage activity. Placement needed.  Discharge/Transfer Needs:      Bedside Shift Report Completed   Bedside Safety Check Completed    Goal Outcome Evaluation:      Plan of Care Reviewed With: patient    Overall Patient Progress: improvingOverall Patient Progress: improving

## 2023-07-19 NOTE — PLAN OF CARE
AAOx4. Up independent in room. Denies pain. C/o of SOB at rest, increase SOB with activity. Oxygen increased to 3L NC, sats in the low 90s. C/o of anxiety and restlessness after inhaler this morning, PO Ativan given x1, reported relief. Continuing PO Cipro (taking before admisson). Left jaw dressing changed by patient. Nicotine patch in place. Continuing IV Solu-medrol. Discharge pending.                            No Repair - Repaired With Adjacent Surgical Defect Text (Leave Blank If You Do Not Want): After obtaining clear surgical margins the defect was repaired concurrently with another surgical defect which was in close approximation.

## 2023-07-19 NOTE — PLAN OF CARE
Problem: Enteral Nutrition  Goal: Safe, Effective Therapy Delivery  Outcome: Progressing  Goal: Feeding Tolerance  Outcome: Met     Goal Outcome Evaluation:      Plan of Care Reviewed With: patient          Outcome Evaluation: Pt complaining of feelings of fullness and inability to feed herself. Cycled TF to nocturnal per pt request.  Increased TF to 80ml/hr and if tolerated increase by 10ml every 8 hours to goal of 120ml/hr x 12 hours w/ 30ml water flushes every 4 hours to provide 1440kcals, 89g protein, 187g CHO, 37g fat, 1208ml free water, 180ml water from flushes for a total of 1388ml fluid daily    Increased Prosource TF20 to 1 pkt BID dt low BUN and Cr which brings totals to 1520kcals and 109g protein daily.   Chocolate Magic cup BID b/n meals. Added assit with feeding comment to diet order

## 2023-07-20 ENCOUNTER — APPOINTMENT (OUTPATIENT)
Dept: PHYSICAL THERAPY | Facility: CLINIC | Age: 58
End: 2023-07-20
Payer: COMMERCIAL

## 2023-07-20 ENCOUNTER — APPOINTMENT (OUTPATIENT)
Dept: SPEECH THERAPY | Facility: CLINIC | Age: 58
End: 2023-07-20
Payer: COMMERCIAL

## 2023-07-20 ENCOUNTER — APPOINTMENT (OUTPATIENT)
Dept: GENERAL RADIOLOGY | Facility: CLINIC | Age: 58
End: 2023-07-20
Attending: HOSPITALIST
Payer: COMMERCIAL

## 2023-07-20 ENCOUNTER — APPOINTMENT (OUTPATIENT)
Dept: OCCUPATIONAL THERAPY | Facility: CLINIC | Age: 58
End: 2023-07-20
Payer: COMMERCIAL

## 2023-07-20 LAB
GLUCOSE BLDC GLUCOMTR-MCNC: 143 MG/DL (ref 70–99)
GLUCOSE BLDC GLUCOMTR-MCNC: 162 MG/DL (ref 70–99)
MAGNESIUM SERPL-MCNC: 1.7 MG/DL (ref 1.7–2.3)
PHOSPHATE SERPL-MCNC: 3.5 MG/DL (ref 2.5–4.5)
POTASSIUM SERPL-SCNC: 3.7 MMOL/L (ref 3.4–5.3)

## 2023-07-20 PROCEDURE — 36415 COLL VENOUS BLD VENIPUNCTURE: CPT | Performed by: INTERNAL MEDICINE

## 2023-07-20 PROCEDURE — 99232 SBSQ HOSP IP/OBS MODERATE 35: CPT | Performed by: HOSPITALIST

## 2023-07-20 PROCEDURE — 250N000013 HC RX MED GY IP 250 OP 250 PS 637: Performed by: SURGERY

## 2023-07-20 PROCEDURE — 250N000013 HC RX MED GY IP 250 OP 250 PS 637: Performed by: HOSPITALIST

## 2023-07-20 PROCEDURE — 83735 ASSAY OF MAGNESIUM: CPT | Performed by: INTERNAL MEDICINE

## 2023-07-20 PROCEDURE — 999N000065 XR ABDOMEN PORT 1 VIEW

## 2023-07-20 PROCEDURE — 94640 AIRWAY INHALATION TREATMENT: CPT

## 2023-07-20 PROCEDURE — 250N000009 HC RX 250: Performed by: INTERNAL MEDICINE

## 2023-07-20 PROCEDURE — 120N000001 HC R&B MED SURG/OB

## 2023-07-20 PROCEDURE — 97110 THERAPEUTIC EXERCISES: CPT | Mod: GP

## 2023-07-20 PROCEDURE — 84100 ASSAY OF PHOSPHORUS: CPT | Performed by: INTERNAL MEDICINE

## 2023-07-20 PROCEDURE — 97530 THERAPEUTIC ACTIVITIES: CPT | Mod: GO

## 2023-07-20 PROCEDURE — 250N000013 HC RX MED GY IP 250 OP 250 PS 637: Performed by: INTERNAL MEDICINE

## 2023-07-20 PROCEDURE — 999N000157 HC STATISTIC RCP TIME EA 10 MIN

## 2023-07-20 PROCEDURE — 94640 AIRWAY INHALATION TREATMENT: CPT | Mod: 76

## 2023-07-20 PROCEDURE — 84132 ASSAY OF SERUM POTASSIUM: CPT | Performed by: INTERNAL MEDICINE

## 2023-07-20 PROCEDURE — 97530 THERAPEUTIC ACTIVITIES: CPT | Mod: GP

## 2023-07-20 PROCEDURE — 92526 ORAL FUNCTION THERAPY: CPT | Mod: GN

## 2023-07-20 RX ORDER — POTASSIUM CHLORIDE 1.5 G/1.58G
20 POWDER, FOR SOLUTION ORAL ONCE
Status: COMPLETED | OUTPATIENT
Start: 2023-07-20 | End: 2023-07-20

## 2023-07-20 RX ORDER — MULTIPLE VITAMINS W/ MINERALS TAB 9MG-400MCG
1 TAB ORAL DAILY
Status: DISCONTINUED | OUTPATIENT
Start: 2023-07-20 | End: 2023-07-28 | Stop reason: HOSPADM

## 2023-07-20 RX ORDER — MAGNESIUM OXIDE 400 MG/1
400 TABLET ORAL EVERY 4 HOURS
Status: COMPLETED | OUTPATIENT
Start: 2023-07-20 | End: 2023-07-20

## 2023-07-20 RX ADMIN — MAGNESIUM OXIDE TAB 400 MG (241.3 MG ELEMENTAL MG) 400 MG: 400 (241.3 MG) TAB at 12:52

## 2023-07-20 RX ADMIN — ACETAMINOPHEN 650 MG: 325 SOLUTION ORAL at 20:44

## 2023-07-20 RX ADMIN — ATORVASTATIN CALCIUM 20 MG: 20 TABLET, FILM COATED ORAL at 09:41

## 2023-07-20 RX ADMIN — POTASSIUM CHLORIDE FOR ORAL SOLUTION 20 MEQ: 1.5 POWDER, FOR SOLUTION ORAL at 12:52

## 2023-07-20 RX ADMIN — DILTIAZEM HYDROCHLORIDE 60 MG: 30 TABLET, FILM COATED ORAL at 18:51

## 2023-07-20 RX ADMIN — LEVALBUTEROL HYDROCHLORIDE 0.63 MG: 0.63 SOLUTION RESPIRATORY (INHALATION) at 16:15

## 2023-07-20 RX ADMIN — CLONAZEPAM 0.5 MG: 0.5 TABLET ORAL at 09:41

## 2023-07-20 RX ADMIN — MAGNESIUM OXIDE TAB 400 MG (241.3 MG ELEMENTAL MG) 400 MG: 400 (241.3 MG) TAB at 09:41

## 2023-07-20 RX ADMIN — DILTIAZEM HYDROCHLORIDE 60 MG: 30 TABLET, FILM COATED ORAL at 12:51

## 2023-07-20 RX ADMIN — IPRATROPIUM BROMIDE 0.5 MG: 0.5 SOLUTION RESPIRATORY (INHALATION) at 08:43

## 2023-07-20 RX ADMIN — NICOTINE 1 PATCH: 14 PATCH, EXTENDED RELEASE TRANSDERMAL at 09:42

## 2023-07-20 RX ADMIN — BUDESONIDE 0.5 MG: 0.5 INHALANT ORAL at 20:48

## 2023-07-20 RX ADMIN — MAGNESIUM OXIDE TAB 400 MG (241.3 MG ELEMENTAL MG) 400 MG: 400 (241.3 MG) TAB at 18:50

## 2023-07-20 RX ADMIN — BUDESONIDE 0.5 MG: 0.5 INHALANT ORAL at 08:43

## 2023-07-20 RX ADMIN — MULTIPLE VITAMINS W/ MINERALS TAB 1 TABLET: TAB at 12:51

## 2023-07-20 RX ADMIN — IPRATROPIUM BROMIDE 0.5 MG: 0.5 SOLUTION RESPIRATORY (INHALATION) at 12:37

## 2023-07-20 RX ADMIN — CYCLOBENZAPRINE HYDROCHLORIDE 5 MG: 5 TABLET, FILM COATED ORAL at 20:44

## 2023-07-20 RX ADMIN — APIXABAN 5 MG: 5 TABLET, FILM COATED ORAL at 09:41

## 2023-07-20 RX ADMIN — LEVOTHYROXINE SODIUM 112 MCG: 0.11 TABLET ORAL at 09:41

## 2023-07-20 RX ADMIN — DILTIAZEM HYDROCHLORIDE 60 MG: 30 TABLET, FILM COATED ORAL at 05:43

## 2023-07-20 RX ADMIN — LEVALBUTEROL HYDROCHLORIDE 0.63 MG: 0.63 SOLUTION RESPIRATORY (INHALATION) at 08:43

## 2023-07-20 RX ADMIN — LEVALBUTEROL HYDROCHLORIDE 0.63 MG: 0.63 SOLUTION RESPIRATORY (INHALATION) at 20:48

## 2023-07-20 RX ADMIN — DILTIAZEM HYDROCHLORIDE 60 MG: 30 TABLET, FILM COATED ORAL at 00:16

## 2023-07-20 RX ADMIN — AMIODARONE HYDROCHLORIDE 200 MG: 200 TABLET ORAL at 09:41

## 2023-07-20 RX ADMIN — APIXABAN 5 MG: 5 TABLET, FILM COATED ORAL at 20:33

## 2023-07-20 RX ADMIN — LEVALBUTEROL HYDROCHLORIDE 0.63 MG: 0.63 SOLUTION RESPIRATORY (INHALATION) at 12:37

## 2023-07-20 RX ADMIN — IPRATROPIUM BROMIDE 0.5 MG: 0.5 SOLUTION RESPIRATORY (INHALATION) at 20:48

## 2023-07-20 ASSESSMENT — ACTIVITIES OF DAILY LIVING (ADL)
ADLS_ACUITY_SCORE: 31
ADLS_ACUITY_SCORE: 31
ADLS_ACUITY_SCORE: 33
ADLS_ACUITY_SCORE: 33
ADLS_ACUITY_SCORE: 31
ADLS_ACUITY_SCORE: 33

## 2023-07-20 NOTE — PLAN OF CARE
To Do:  End of Shift Summary  For vital signs and complete assessments, please see documentation flowsheets.     Pertinent assessments: A/Ox4, VSS, on RA. LS diminished, denies SOB. Denies pain, N/V. Enteral feeding 80 mL/hr.     Major Shift Events: None.     Treatment Plan: Symptom management. Enteral nutrition. Eliquis. Nebs. PT/OT/Speech consult.     Bedside Nurse: Catarina Tamayo RN

## 2023-07-20 NOTE — PROGRESS NOTES
[facility]    Medicine Progress Note - Hospitalist Service    Date of Admission:  6/20/2023    Assessment & Plan   Lara Melendez is a 57 year old female with a history of COPD on prn home oxygen 2-3 L/nc, tobacco dependence, htn/hlp, PAF chronically on apixaban, anxiety, hypothyroidism, psoriasis admitted on 6/20/2023 with SOB and wheezing.     Patient has had a protracted admission here at [facility].  Her symptoms reportedly started a few days prior to admission and she called her pulmonologist who initiated empiric azithromycin and prednisone.  Symptoms continued to worsen so EMS was called and she was brought to the ER.  Her respiratory status continued to decline and she required intubation the day after admission.  She has been treated for community-acquired pneumonia along with COPD exacerbation with steroids and antibiotics.  While on the ventilator, patient had significant bronchospasm and high airway pressures requiring deep sedation and paralysis.  [facility] was actually consulted for possibility of ECMO but she was not deemed an appropriate candidate.  She was also found to have a large pneumothorax on 6/26 with chest tube placed by IR which has subsequently been removed (pulled on 7/10).  She has also had recurrent A-fib with RVR requiring both cardioversion and diltiazem with amiodarone.  She was continued on treatment with steroids, nebs, antibiotics and aggressive diuresis.  Paralytics were able to be weaned off but she did require tracheostomy for long-term ventilator weaning.     Patient had worsening abdominal distention on 7/11.  She had a CT scan done that was concerning for large bowel obstruction.  Colorectal surgery was consulted and she underwent surgical repair and colostomy in the early a.m. of 7/12.  Now medically stable and looking into disposition options.       #Acute on chronic hypoxic and hypercapnic respiratory failure. COPD with acute exacerbation chronically on 2-3  L/nc prn. Left-sided ptx. PNA with sputum + for klebsiella oxytoca. Pulmonary edema 2/2 fluid resuscitation for sepsis status post tracheotomy  Complicated respiratory course-initially intubated due to progressive decline in respiratory function after admission-high airway pressures from severe bronchospasm required deep sedation including paralysis with vec.  She was also treated with 24 hours of continuous albuterol neb.  Lower respiratory track positive for Klebsiella as above along with rhinovirus.  -Patient completed steroid course.  She also completed course of IV antibiotics.  -Chest tube placed by IR on 6/26 for pneumothorax, subsequently removed on 7/10.  -Continue on DuoNebs.  Appreciate pulmonary consultation.  -IV Lasix given prn for volume overload.   -Will need placement for continued recovery. Now looking at acute rehab. Needs to recover from surgical perspective first.  -Speech is working with the patient.  Trach down-sized on 7/15 with better comfort and able to talk more.    -She is on RA on 7/17. Monitor secretions and if doing well then may be able to de-cannulate prior to discharge to LTACH.   -Appreciate intensivist assistance  -Droplet precautions  --Discussed decannulation given she has been on room air.  She is still having some secretions per respiratory therapy.  Monitor in the coming 1 to 2 days to determine readiness for decannulation.  If cannot decannulate here then will need to follow-up with Dr. Mackey in clinic for de-cannulation. I'm hopeful we can de-cannulate prior to discharge.     #Ileus. Large bowel obstruction s/p diverting colostomy: First tried bowel meds without improvement.  Patient had worsening abdominal distention and pain.  CT abdomen pelvis done on 7/12 showed colonic dilation with abrupt termination at the mid sigmoid colon with suggestion of pneumatosis.  Colorectal surgery was consulted.  Underwent operative repair early a.m. of 7/12.  Seem to tolerate  well.  Appreciate colorectal surgery for postoperative cares.    -Starting on tube feeds again on 7/14.  -We have been holding some nonessential medications per surgery team recommendations initially.  Reinitiation of tube feeds, these meds have now been resumed.  Tolerating feeds.     #Paroxysmal AF/SVT: Pta on diltiazem and apixaban (BB being avoided due to severe COPD). Required DCCV x 2 during this hospitalization.    -apixiban  -amiodarone     #Sepsis. Elevated lactate: Her hospital course has been peppered with episodic sepsis with leukocytosis/tachycardia/fever/respiratory failure etc  -resolved  -elevated lactate due to sepsis, and nebs      #Hypotension: Did require transient phenylephrine support due to deep sedation. Now resolved.      #CAUTI.  Pseudomonas UTI: Hines cath placed for critical illness, UA 6/28 with inflammatory cells with large blood. UCx with  K with pseudomonas  -rec'd 2 days of liberty followed by 4 days of cefepime, abx completed 7/6  -Hines removed     #Hyperkalemia. Hypokalemia: Potassium bola to 5.5 and persisted in that range for a day or 2.  She received several doses of Lokelma and potassium level has now normalized and actually downtrending.  Unclear etiology.  Renal function is normal.  Her PTA losartan has been on hold.  Replacement as needed.  Monitor.     #Hypernatremia: Significant rise in sodium up to 160 with tube feed initiation.  Improved with high-dose free water via NG and D5, but remained at about 150.  Sodium is now normalized after dose of metolazone and Lasix drip.  Monitoring     #Hypertension pta on dilt and losartan. dilt back on and being titrated. Losartan discontinued in the setting of hyperkalemia     #Anemia  Drifted down to as low as 6.8-otherwise hemodynamically stable. No clear bleeding noted.  Suspect critical illness/venipuncture mediated  -transfused single unit PRBC 7/8  - hgb now 9.3     DVT Prophylaxis: Resumed DOAC on 7/16.   Code Status: Full  "Code  Lines: Remove PICC line on 7/18.  Hines removed.  Peripheral IVs.  Dispo: Working on placement.  LTAC versus acute rehab.  May discharge when placement available.  X tom to medical floor.       Diet: Snacks/Supplements Adult: Magic Cup; Between Meals  Adult Formula Drip Feeding: Continuous Osmolite 1.5; Nasogastric tube; Goal Rate: 80; mL/hr; From: 7:00 PM; To: 7:00 AM; Okay to start at goal rate  Combination Diet Regular Diet    DVT Prophylaxis: DOAC  Hines Catheter: Not present  Lines: None     Cardiac Monitoring: None  Code Status: Full Code      Clinically Significant Risk Factors              # Hypoalbuminemia: Lowest albumin = 2.8 g/dL at 7/12/2023  4:43 AM, will monitor as appropriate     # Hypertension: Noted on problem list        # Overweight: Estimated body mass index is 28.16 kg/m  as calculated from the following:    Height as of this encounter: 1.6 m (5' 3\").    Weight as of this encounter: 72.1 kg (158 lb 15.2 oz).           Disposition Plan      Expected Discharge Date: 07/24/2023      Destination: other (comment) (LTACH)            Lalo Mckeon MD  Hospitalist Service  [facility]  Securely message with [employer] (more info)  Text page via [employer] Paging/Directory   ______________________________________________________________________    Interval History   Patient seen examined at bedside/chart reviewed.  She is feeling stronger daily.  Does report still ongoing weakness of her lower extremities.  Patient reporting NG tube not flushing since last evening.  Apparently diet was also restarted to Modified texture chewing regular diet    Physical Exam   Vital Signs: Temp: 99.4  F (37.4  C) Temp src: Oral BP: 131/68 Pulse: 91   Resp: 20 SpO2: 97 % O2 Device: None (Room air)    Weight: 158 lbs 15.23 oz NC Katiw    General:  Alert, calm, NAD  CV: regular rate and rhythm, no murmurs or rubs  Lungs:  Clear to ascultation bilaterally, normal respiratory effort  HEENT:  Trach in " place  Abdomen:  Soft, nontender, nondistended, no masses, normal bowel sounds, colostomy in place with stool in bag  Extremities:  No edema  Neuro: normal strength and sensation in all 4 extremities, cranial nerves grossly intact  Psychiatric:  Mood and affect within normal limits       Medical Decision Making       40 MINUTES SPENT BY ME on the date of service doing chart review, history, exam, documentation & further activities per the note.      Data   NOTE: Data reviewed over the past 24 hrs contributes toward MDM complexity

## 2023-07-20 NOTE — PLAN OF CARE
[id number]:    A&Ox4. Up to chair twice this shift with lift. Stool with PT today. Advanced to and tolerating regular easy chew diet, eating 100% of meals. Unable to flush TF after medications given this morning, X-ray for placement confirmation ordered, showed kinked tube, tube pulled back 5cm by writer and charge RN, repeat x-ray showed tube continues to be clamped, critical care RN able to get tube to flush easily after using hot water. TF from 2828-0928 next AM. Trach capped. On RA. Ostomy with water stool output, lots of gas in bag, and burped frequently. K+ 3.7 and Mag 1.7 replacement given, recheck both in the AM. RT reports pulmonology should be consulted to discuss/de-cannula trach, provider informed via [place] messaging. Family/Friends at bedside throughout the day. Plan to discharge to TCU vs ARU SW-CC following.

## 2023-07-20 NOTE — PLAN OF CARE
Problem: COPD (Chronic Obstructive Pulmonary Disease)  Goal: Improved Nutrition Intake  Outcome: Progressing     Problem: Enteral Nutrition  Goal: Feeding Tolerance  Outcome: Progressing     Goal Outcome Evaluation:      Plan of Care Reviewed With: patient          Outcome Evaluation: Pt ready to start eating solid foods. Throat sore from FT. Eating 25% per nursing records. Adjusted multivitamin to tablet dt documented diarrhea and liquid mvi containing sorbitol,   Continue TF as ordered until tolerating full meals/supplements, Ordered  Chocolate Ensure Enlive BID w/ meals kept in bucket of ice to promote taste and encourage intake

## 2023-07-20 NOTE — PROGRESS NOTES
CLINICAL NUTRITION SERVICES - BRIEF NOTE     Nutrition Prescription    RECOMMENDATIONS FOR MDs/PROVIDERS TO ORDER:  None    Malnutrition Status:    Patient does not meet criteria for malnutrition    Recommendations already ordered by Registered Dietitian (RD):  Adjusted multivitamin to tablet dt documented diarrhea and liquid mvi containing sorbitol    Continue TF as ordered until tolerating full meals/supplements    Chocolate Ensure Enlive BID w/ meals kept in bucket of ice to promote taste and encourage intake    Future/Additional Recommendations:  Adjust flushes pending hydration status  Adjust TF pending PO intake       EVALUATION OF THE PROGRESS TOWARD GOALS   Diet: Full Liquid  Nutrition Support: Pt w/ NJ tube placed 6/28/23. Pt receiving Osmolite 1.5 @ 80ml x 12 hrs   Intake: Pt eating 25% of meals per nursing records and received 1450kcals and 55g protein while advanced from clear liquids    Osmolite 1.5 providing 1440kcals, 60g protein, 196g CHO, 47g fat, 732ml free water, 180ml water from flushes for a total of 912ml fluid daily.     Prosource TF20 brings totals to 1520kcals and 80g protein daily     NEW FINDINGS   Pt stated her TF feeding is better than it was before yet her throat is still raw dt the FT making it painful to swallow. Pt would like to have TF removed today. Willing to try Chocolate Ensure BID if kept in a bucket of ice because if it isn't kept cold she will not drink it.     07/20/23 0542 72.1 kg (158 lb 15.2 oz)     07/17/23 0600 65.5 kg (144 lb 6.4 oz)   07/16/23 0700 68.1 kg (150 lb 2.1 oz)   07/15/23 0545 69.6 kg (153 lb 7 oz)     07/14/23 0400 69.3 kg (152 lb 12.5 oz)   07/13/23 0600 68.5 kg (151 lb 0.2 oz)   07/11/23 0500 70.6 kg (155 lb 10.3 oz)   07/10/23 0600 69.6 kg (153 lb 7 oz)               07/09/23 0552 69.9 kg (154 lb 1.6 oz)   07/08/23 0445 68.6 kg (151 lb 3.8 oz)   07/07/23 0600 67 kg (147 lb 11.3 oz)   07/06/23 0300 68.1 kg (150 lb 2.1 oz)   07/05/23 0615 68.3 kg (150 lb  "9.2 oz)   07/04/23 0630 72.3 kg (159 lb 6.3 oz)   07/03/23 0430 76.1 kg (167 lb 12.3 oz)   07/02/23 0529 80.6 kg (177 lb 11.1 oz)     07/01/23 0448 79.1 kg (174 lb 6.1 oz)   06/30/23 0600 79.1 kg (174 lb 6.1 oz)   06/29/23 0430 78.2 kg (172 lb 6.4 oz)   06/28/23 0556 75.5 kg (166 lb 7.2 oz)   06/27/23 0600 75.3 kg (166 lb 0.1 oz)   06/26/23 0615 76.4 kg (168 lb 6.9 oz)   06/25/23 0530 77 kg (169 lb 12.1 oz)   06/24/23 0600 73.7 kg (162 lb 7.7 oz)   06/23/23 0345 67.7 kg (149 lb 4 oz)   06/21/23 0304 64.5 kg (142 lb 3.2 oz)     Net fluid up 0.8L (1.8lbs) since 7/6/23 per I/Os    ANTHROPOMETRICS  Height: 160 cm (5' 3\")  Most Recent Weight: 79.1 kg (174 lb 6.1 oz)    IBW: 52.4 kg  BMI: Obesity Grade I BMI 30-34.9  Weight History:   Wt Readings from Last 30 Encounters:   07/20/23 72.1 kg (158 lb 15.2 oz)   04/01/23 68.8 kg (151 lb 9.6 oz)   02/11/23 60 kg (132 lb 4.4 oz)   12/02/22 61.3 kg (135 lb 2.3 oz)   09/28/22 63.1 kg (139 lb 1.6 oz)   08/29/22 61.7 kg (136 lb 1.6 oz)   05/17/22 61.2 kg (134 lb 14.4 oz)   04/02/22 58.9 kg (129 lb 12.8 oz)   02/21/22 59 kg (130 lb)   02/04/22 59 kg (130 lb)   02/04/22 59 kg (130 lb)   01/11/22 60.7 kg (133 lb 14.4 oz)       PHYSICAL FINDINGS  See malnutrition section below.  Bruised arm  Surgical incision    GI CONCERNS  colostomy, diarrhea, LBM 7/20/23     LABS  Reviewed: Na 135, UN 7.8, Cr 0.42, hgb 8.8, hematocrit 27.7, rbc 2.82, RDW 15.8    MEDICATIONS  Reviewed: synthroid/levothroid, mag-ox, prosource TF20    ASSESSED NUTRITION NEEDS  Dose weight: 65.5 kg  Estimated Energy Needs: 6281-7677 kcals/day (25 kcal/kg)  Justification: Vented  Estimated Protein Needs:  grams protein/day (1.2 - 2 grams of pro/kg)  Justification: Hypercatabolism with critical illness  Estimated Fluid Needs: 1925 mL/day (30 mL/kg)   Justification: Maintenance      "

## 2023-07-20 NOTE — PROGRESS NOTES
[facility] Nurse Inpatient Assessment     Consulted for: colostomy due to LBO  Pre-operative diagnosis:         Large bowel obstruction  Post-operative diagnosis        Same as pre-operative diagnosis      Patient History (according to provider note(s):      Acute on chronic hypoxemic and hypercapnic respiratory failure  COPD with acute exacerbation: Has had increasing shortness of breath for a few days secondary to the poor air quality this week.  Has had a cough nonproductive sputum.  Started a Z-Bennett 2 days ago and prednisone 40 mg/day for 1 day then 30 mg/day for 2 days.  Increasing shortness of breath, wheezing, and chest tightness despite using nebs frequently.  PTA on Advair and DuoNebs and albuterol nebs as needed.  Has 2 L O2 at home as needed, but usually does not use.  Tachypneic and tachycardic in the ER.  Hypercapnic initially with VBG showing pH 7.24, PCO2 69, PO2 223, bicarb 29.  Repeat VBG on BiPAP is much improved.  Chest x-ray shows hyperinflated lungs without any new infiltrate.  Slight leukocytosis at 11.8, but has been on prednisone and procalcitonin 0.04.  Received 125 mg IV Solu-Medrol in route    Assessment:      Assessment of new end Colostomy:  Diagnosis Pertinent to Stoma: Bowel Obstruction      Surgery Date: 7/12/2023  Surgeon:Dr Jaspal Rashid        [facility]: [facility]  Pouching system in place on assessment today: [place] one piece, cut to fit, flat and barrier ring   Pouch barrier status: intact  Pouch last changed/wear time: 3-4 days  Reason for pouch change today: pouch not changed today and pouch changed 7/18  Effectiveness of current pouching/ supply plan: Effective  Change made with ostomy management today: No  Supplies: at bedside  Last photo: 7/18/23    Stoma location: LUQ  Stoma size: approximately 1 1/4 inches, Red robbinson bridge was removed 7/18/23 leaving 2 significant open areas superior and inferior to soma where bridge was located    Stoma  "appearance: viable, healthy, pink/red, flush  Mucocutaneous junction:  Intact (on 7/18)-open areas are not an MCJ separation but rather are where bridge was   Peristomal complication(s): see above   Output: soft, toothpaste consistency and brown  Output volume emptied during visit: 10ml  Abdominal assessment: Soft and Distended  Surgical site(s): open to air  NG still in place? No  Pain: denies   Is patient still on a PCA? No    Ostomy education assessment:  Participant of teaching session today: patient  and nurse  Education completed today: Stoma assessment, Pouching system assessment , Ostomy accessory product use , Peristomal skin care, Pouch emptying demonstration, Intake and output recording, Fluid and electrolyte balance , Importance of hydration, Low fiber diet , Odor/flatus management , Hernia prevention, Lifestyle adjustments  and carbonated beverages will create more gas, refrain from drinking them  and your stool will likely become pastey  Educational materials/methods: Verbal and Demonstration  Learning Comprehension:   Psychosocial assessment: alert   Patient readiness for education today: observing and attentive  Following today's visit: patient  and nurse is able to demonstrate;         1. How to empty their pouch? No        2. How to change their pouch? No        3. How to read and record intake and output correctly? No  Preparation for discharge completed: Placed prescription recommendations in discharge navigator for MD to sign  Pt support system on discharge: family and 2 daughter   WOC recommend home care? Yes if going home  Face to face time: 20 minutes    Treatment Plan:     LUQ Colostomy pouching plan:   Pouching system: ostomy supplies pouches: [place] FECAL (105315)   Accessories used: [facility] ostomy accessories: 2\" Cera Barrier Ring (142268), Powder (291462) and M9 Drops ([id number])   Frequency of pouch changes: Three times a week  WO follow up plan: Daily Monday-Friday (as able)  Bedside " RN interventions: Change pouch PRN if leaking using the supplies above, Empty pouch when 1/3 to 1/2 full, ensure to clean pouch outlet after emptying to prevent odor, Notify WOC for ongoing pouch leakage and Document stoma appearance and output volume, color, and consistency every shift      Orders: Reviewed    DATA:     Current support surface: Standard  Low air loss (YONAS pump, Isolibrium, Pulsate, skin guard, etc)  Containment of urine/stool: Indwelling catheter and Colostomy pouch  BMI: Body mass index is 28.16 kg/m .   Active diet order: Orders Placed This Encounter      Combination Diet Full Liquid     Output: I/O last 3 completed shifts:  In: 977 [I.V.:90; NG/GT:210]  Out: 950 [Stool:950]     Labs:   Recent Labs   Lab 07/19/23  0516   HGB 9.3*   WBC 4.8     Pressure injury risk assessment:   Sensory Perception: 3-->slightly limited  Moisture: 4-->rarely moist  Activity: 1-->bedfast  Mobility: 2-->very limited  Nutrition: 2-->probably inadequate  Friction and Shear: 2-->potential problem  Lee Score: 14    HOA Golden RN NOELLE Smiley [facility] [employer]  Dept. Office Number: [phone]

## 2023-07-20 NOTE — PROGRESS NOTES
"Care Management Follow Up    Length of Stay (days): 29    Expected Discharge Date: 07/24/2023     Concerns to be Addressed:       Patient plan of care discussed at interdisciplinary rounds: Yes    Anticipated Discharge Disposition: TCU vs ARU     Anticipated Discharge Services:    Anticipated Discharge DME:      Patient/family educated on Medicare website which has current facility and service quality ratings: yes  Education Provided on the Discharge Plan:    Patient/Family in Agreement with the Plan:      Referrals Placed by CM/SW: Post Acute Facilities  Private pay costs discussed: Not applicable    Additional Information:  Patient now out of ICU. Met with patient at the bedside to discuss next steps and discharge planning. CM discussed that she no longer qualifies for LTACH. Discussed the possibility of ARU vs TCU and what the differences are and what she currently would qualify for. Due to ARUs intensive therapy plan she may not be able to tolerate the amount of therapy required. TCU is the current recommendation. Left patient with SNF packet to review. Patient has no experience with TCU so has no preferences but wants to go to someplace that will \"give her what she needs to get home.\" She was going to share information with her daughter and spouse.  Patient advanced to a regular diet today and may have NJ tube discontinued. There is possibility of trach decannulation per MD notes. Will wait to send referrals when this is confirmed so TCUs have accurate level of care needed.    Will continue to follow for discharge planning.    Bhakti Walters RN BSN OCN  Care Coordinator  [facility]  [phone]            "

## 2023-07-20 NOTE — PLAN OF CARE
To Do:  End of Shift Summary  For vital signs and complete assessments, please see documentation flowsheets.     Pertinent assessments: A&Ox4, use bedside commode x2 with celing lift and assist x2.  trach in place, clear without drainage and no need change. NJ tube running 80ml/hr and flush q4hr from 7pm to 7am. Colostomy bag cleaned once. Food boost in use. LS dim and have productive cough. VSS.  Major Shift Events: new admission from ICU and messaged on-call  Confirm if patient need Tele.     Treatment Plan: tube feeding, pain management, PT/OT/speech therapy consult.     Bedside Nurse: Mary Jane Daigle RN

## 2023-07-21 ENCOUNTER — APPOINTMENT (OUTPATIENT)
Dept: SPEECH THERAPY | Facility: CLINIC | Age: 58
End: 2023-07-21
Payer: COMMERCIAL

## 2023-07-21 ENCOUNTER — APPOINTMENT (OUTPATIENT)
Dept: PHYSICAL THERAPY | Facility: CLINIC | Age: 58
End: 2023-07-21
Payer: COMMERCIAL

## 2023-07-21 ENCOUNTER — TELEPHONE (OUTPATIENT)
Dept: PULMONOLOGY | Facility: CLINIC | Age: 58
End: 2023-07-21
Payer: COMMERCIAL

## 2023-07-21 ENCOUNTER — APPOINTMENT (OUTPATIENT)
Dept: OCCUPATIONAL THERAPY | Facility: CLINIC | Age: 58
End: 2023-07-21
Payer: COMMERCIAL

## 2023-07-21 ENCOUNTER — APPOINTMENT (OUTPATIENT)
Dept: GENERAL RADIOLOGY | Facility: CLINIC | Age: 58
End: 2023-07-21
Attending: INTERNAL MEDICINE
Payer: COMMERCIAL

## 2023-07-21 ENCOUNTER — APPOINTMENT (OUTPATIENT)
Dept: MRI IMAGING | Facility: CLINIC | Age: 58
End: 2023-07-21
Attending: PSYCHIATRY & NEUROLOGY
Payer: COMMERCIAL

## 2023-07-21 LAB
BACTERIA BRONCH: NORMAL
CREAT SERPL-MCNC: 0.38 MG/DL (ref 0.51–0.95)
GFR SERPL CREATININE-BSD FRML MDRD: >90 ML/MIN/1.73M2
GLUCOSE BLDC GLUCOMTR-MCNC: 113 MG/DL (ref 70–99)
GLUCOSE BLDC GLUCOMTR-MCNC: 125 MG/DL (ref 70–99)
HBA1C MFR BLD: 5.5 %
MAGNESIUM SERPL-MCNC: 1.7 MG/DL (ref 1.7–2.3)
PHOSPHATE SERPL-MCNC: 3.7 MG/DL (ref 2.5–4.5)
PLATELET # BLD AUTO: 231 10E3/UL (ref 150–450)
POTASSIUM SERPL-SCNC: 4 MMOL/L (ref 3.4–5.3)

## 2023-07-21 PROCEDURE — 85049 AUTOMATED PLATELET COUNT: CPT | Performed by: HOSPITALIST

## 2023-07-21 PROCEDURE — 250N000013 HC RX MED GY IP 250 OP 250 PS 637: Performed by: HOSPITALIST

## 2023-07-21 PROCEDURE — 250N000013 HC RX MED GY IP 250 OP 250 PS 637: Performed by: SURGERY

## 2023-07-21 PROCEDURE — 250N000011 HC RX IP 250 OP 636: Mod: JZ | Performed by: INTERNAL MEDICINE

## 2023-07-21 PROCEDURE — 250N000013 HC RX MED GY IP 250 OP 250 PS 637: Performed by: INTERNAL MEDICINE

## 2023-07-21 PROCEDURE — 92526 ORAL FUNCTION THERAPY: CPT | Mod: GN

## 2023-07-21 PROCEDURE — 97530 THERAPEUTIC ACTIVITIES: CPT | Mod: GO

## 2023-07-21 PROCEDURE — 83735 ASSAY OF MAGNESIUM: CPT | Performed by: HOSPITALIST

## 2023-07-21 PROCEDURE — G0463 HOSPITAL OUTPT CLINIC VISIT: HCPCS

## 2023-07-21 PROCEDURE — 72148 MRI LUMBAR SPINE W/O DYE: CPT

## 2023-07-21 PROCEDURE — 250N000009 HC RX 250: Performed by: INTERNAL MEDICINE

## 2023-07-21 PROCEDURE — 97530 THERAPEUTIC ACTIVITIES: CPT | Mod: GP | Performed by: PHYSICAL THERAPIST

## 2023-07-21 PROCEDURE — 84132 ASSAY OF SERUM POTASSIUM: CPT | Performed by: HOSPITALIST

## 2023-07-21 PROCEDURE — 94640 AIRWAY INHALATION TREATMENT: CPT | Mod: 76

## 2023-07-21 PROCEDURE — 94640 AIRWAY INHALATION TREATMENT: CPT

## 2023-07-21 PROCEDURE — 82565 ASSAY OF CREATININE: CPT | Performed by: HOSPITALIST

## 2023-07-21 PROCEDURE — 999N000157 HC STATISTIC RCP TIME EA 10 MIN

## 2023-07-21 PROCEDURE — 99233 SBSQ HOSP IP/OBS HIGH 50: CPT | Performed by: INTERNAL MEDICINE

## 2023-07-21 PROCEDURE — 71046 X-RAY EXAM CHEST 2 VIEWS: CPT

## 2023-07-21 PROCEDURE — 83036 HEMOGLOBIN GLYCOSYLATED A1C: CPT | Performed by: INTERNAL MEDICINE

## 2023-07-21 PROCEDURE — 84100 ASSAY OF PHOSPHORUS: CPT | Performed by: HOSPITALIST

## 2023-07-21 PROCEDURE — 120N000001 HC R&B MED SURG/OB

## 2023-07-21 PROCEDURE — 36415 COLL VENOUS BLD VENIPUNCTURE: CPT | Performed by: HOSPITALIST

## 2023-07-21 RX ORDER — MAGNESIUM SULFATE HEPTAHYDRATE 40 MG/ML
2 INJECTION, SOLUTION INTRAVENOUS ONCE
Status: COMPLETED | OUTPATIENT
Start: 2023-07-21 | End: 2023-07-21

## 2023-07-21 RX ORDER — ACETAMINOPHEN 650 MG/1
650 SUPPOSITORY RECTAL EVERY 4 HOURS PRN
Status: DISCONTINUED | OUTPATIENT
Start: 2023-07-21 | End: 2023-07-21

## 2023-07-21 RX ORDER — ACETAMINOPHEN 325 MG/1
650 TABLET ORAL EVERY 4 HOURS PRN
Status: DISCONTINUED | OUTPATIENT
Start: 2023-07-21 | End: 2023-07-28 | Stop reason: HOSPADM

## 2023-07-21 RX ADMIN — LEVALBUTEROL HYDROCHLORIDE 0.63 MG: 0.63 SOLUTION RESPIRATORY (INHALATION) at 21:32

## 2023-07-21 RX ADMIN — IPRATROPIUM BROMIDE 0.5 MG: 0.5 SOLUTION RESPIRATORY (INHALATION) at 12:57

## 2023-07-21 RX ADMIN — CYCLOBENZAPRINE HYDROCHLORIDE 5 MG: 5 TABLET, FILM COATED ORAL at 20:49

## 2023-07-21 RX ADMIN — MULTIPLE VITAMINS W/ MINERALS TAB 1 TABLET: TAB at 09:41

## 2023-07-21 RX ADMIN — DILTIAZEM HYDROCHLORIDE 60 MG: 30 TABLET, FILM COATED ORAL at 05:09

## 2023-07-21 RX ADMIN — MAGNESIUM SULFATE HEPTAHYDRATE 2 G: 2 INJECTION, SOLUTION INTRAVENOUS at 09:59

## 2023-07-21 RX ADMIN — NICOTINE 1 PATCH: 14 PATCH, EXTENDED RELEASE TRANSDERMAL at 09:43

## 2023-07-21 RX ADMIN — IPRATROPIUM BROMIDE 0.5 MG: 0.5 SOLUTION RESPIRATORY (INHALATION) at 21:32

## 2023-07-21 RX ADMIN — ACETAMINOPHEN 650 MG: 325 SOLUTION ORAL at 09:41

## 2023-07-21 RX ADMIN — AMIODARONE HYDROCHLORIDE 200 MG: 200 TABLET ORAL at 09:42

## 2023-07-21 RX ADMIN — ATORVASTATIN CALCIUM 20 MG: 20 TABLET, FILM COATED ORAL at 09:41

## 2023-07-21 RX ADMIN — DILTIAZEM HYDROCHLORIDE 60 MG: 30 TABLET, FILM COATED ORAL at 13:15

## 2023-07-21 RX ADMIN — PAROXETINE 20 MG: 10 SUSPENSION ORAL at 09:42

## 2023-07-21 RX ADMIN — BUDESONIDE 0.5 MG: 0.5 INHALANT ORAL at 21:32

## 2023-07-21 RX ADMIN — MAGNESIUM OXIDE TAB 400 MG (241.3 MG ELEMENTAL MG) 400 MG: 400 (241.3 MG) TAB at 09:41

## 2023-07-21 RX ADMIN — APIXABAN 5 MG: 5 TABLET, FILM COATED ORAL at 09:42

## 2023-07-21 RX ADMIN — BUDESONIDE 0.5 MG: 0.5 INHALANT ORAL at 07:24

## 2023-07-21 RX ADMIN — LEVALBUTEROL HYDROCHLORIDE 0.63 MG: 0.63 SOLUTION RESPIRATORY (INHALATION) at 07:24

## 2023-07-21 RX ADMIN — LEVALBUTEROL HYDROCHLORIDE 0.63 MG: 0.63 SOLUTION RESPIRATORY (INHALATION) at 12:57

## 2023-07-21 RX ADMIN — DILTIAZEM HYDROCHLORIDE 60 MG: 30 TABLET, FILM COATED ORAL at 18:08

## 2023-07-21 RX ADMIN — LEVOTHYROXINE SODIUM 112 MCG: 0.11 TABLET ORAL at 09:41

## 2023-07-21 RX ADMIN — DILTIAZEM HYDROCHLORIDE 60 MG: 30 TABLET, FILM COATED ORAL at 00:27

## 2023-07-21 RX ADMIN — ACETAMINOPHEN 650 MG: 325 SOLUTION ORAL at 05:10

## 2023-07-21 RX ADMIN — APIXABAN 5 MG: 5 TABLET, FILM COATED ORAL at 20:49

## 2023-07-21 RX ADMIN — ACETAMINOPHEN 650 MG: 325 TABLET, FILM COATED ORAL at 16:25

## 2023-07-21 RX ADMIN — ACETAMINOPHEN 650 MG: 325 TABLET, FILM COATED ORAL at 20:55

## 2023-07-21 RX ADMIN — IPRATROPIUM BROMIDE 0.5 MG: 0.5 SOLUTION RESPIRATORY (INHALATION) at 07:24

## 2023-07-21 ASSESSMENT — ACTIVITIES OF DAILY LIVING (ADL)
ADLS_ACUITY_SCORE: 29
ADLS_ACUITY_SCORE: 33
ADLS_ACUITY_SCORE: 30
ADLS_ACUITY_SCORE: 29
ADLS_ACUITY_SCORE: 33
ADLS_ACUITY_SCORE: 31
ADLS_ACUITY_SCORE: 33
ADLS_ACUITY_SCORE: 29
ADLS_ACUITY_SCORE: 33

## 2023-07-21 NOTE — PROGRESS NOTES
Care Management Follow Up    Length of Stay (days): 30    Expected Discharge Date: 07/24/2023     Concerns to be Addressed: discharge planning, Transitional Care Unit, f/u appts      Patient plan of care discussed at interdisciplinary rounds: Yes    Anticipated Discharge Disposition: Transitional Care, Acute Rehab    Patient/family educated on Medicare website which has current facility and service quality ratings: yes     Patient/Family in Agreement with the Plan: yes     Referrals Placed by CM/SW: Post Acute Facilities      Additional Information:  CM following for patient need for Transitional Care Unit on discharge. Per chart review today, patient will need to keep her FT for one more day to assess nutrition status but will likely get it removed tomorrow. Her trach will also stay in place for another couple of weeks until follow up appt can be made with Pulm that placed the trach. Chart review indicates patient had her trach placed by Dr Sen. Call placed to Pulmonology at [facility] to inquire about follow up appt for trach removal. This physician is booking out appts for a long time in future so they will message Dr Sen's care team and reach out to 5th floor charge RN with appt availability. CM will follow up with patient once feeding tube is removed to send out Transitional Care Unit referrals, possibly tomorrow. Per MD, patient may be discharge ready early next week. Will cont to follow for discharge plan of care.               Chyna Christian RN BSN CM  Inpatient Care Coordination  [facility]  [phone]

## 2023-07-21 NOTE — PROGRESS NOTES
[facility] Nurse Inpatient Assessment     Consulted for: colostomy due to LBO  Pre-operative diagnosis:         Large bowel obstruction  Post-operative diagnosis        Same as pre-operative diagnosis      Patient History (according to provider note(s):      Acute on chronic hypoxemic and hypercapnic respiratory failure  COPD with acute exacerbation: Has had increasing shortness of breath for a few days secondary to the poor air quality this week.  Has had a cough nonproductive sputum.  Started a Z-Bennett 2 days ago and prednisone 40 mg/day for 1 day then 30 mg/day for 2 days.  Increasing shortness of breath, wheezing, and chest tightness despite using nebs frequently.  PTA on Advair and DuoNebs and albuterol nebs as needed.  Has 2 L O2 at home as needed, but usually does not use.  Tachypneic and tachycardic in the ER.  Hypercapnic initially with VBG showing pH 7.24, PCO2 69, PO2 223, bicarb 29.  Repeat VBG on BiPAP is much improved.  Chest x-ray shows hyperinflated lungs without any new infiltrate.  Slight leukocytosis at 11.8, but has been on prednisone and procalcitonin 0.04.  Received 125 mg IV Solu-Medrol in route    Assessment:      Assessment of new end Colostomy:  Diagnosis Pertinent to Stoma: Bowel Obstruction      Surgery Date: 7/12/2023  Surgeon:Dr Jaspal Rashid        [facility]: [facility]  Pouching system in place on assessment today: [place] one piece, cut to fit, flat and barrier ring   Pouch barrier status: intact  Pouch last changed/wear time: 3-4 days  Reason for pouch change today: ostomy education and routine schedule  Effectiveness of current pouching/ supply plan: Effective  Change made with ostomy management today: No  Supplies: at bedside  Last photo: 7/18/23    Stoma location: LUQ  Stoma size: approximately 1 1/4 inches, Red galson bridge was removed 7/18/23 leaving 2 significant open areas superior and inferior to soma where bridge was located    Stoma appearance:  viable, healthy, pink/red, flush  Mucocutaneous junction:  Intact (on 7/18)-open areas are not an MCJ separation but rather are where bridge was   Peristomal complication(s): see above   Output: soft, toothpaste consistency and brown  Output volume emptied during visit: 10ml  Abdominal assessment: Soft  Surgical site(s): open to air  NG still in place? No  Pain: denies   Is patient still on a PCA? No    Ostomy education assessment:  Participant of teaching session today: patient   Education completed today: Stoma assessment, Pouching system assessment , Pouch change demonstration, Ostomy accessory product use , Pouch emptying demonstration, Intake and output recording, Fluid and electrolyte balance , Importance of hydration, Low fiber diet , Odor/flatus management , Hernia prevention, Lifestyle adjustments  and carbonated beverages will create more gas, refrain from drinking them  and your stool will likely become pastey  Educational materials/methods: Verbal, Demonstration and reinforcing the need for her to take control and reduce her consumption of carbonation - drip drop or hint water  Learning Comprehension:   Psychosocial assessment: alert   Patient readiness for education today: attentive and active participation  Following today's visit: patient  and nurse is able to demonstrate;         1. How to empty their pouch? with heavy assist        2. How to change their pouch? Demo provided  and with moderate assist        3. How to read and record intake and output correctly? Demo provided  and Needs additional practice   Preparation for discharge completed: Placed prescription recommendations in discharge navigator for MD to sign  Pt support system on discharge: family and 2 daughter   [facility] recommend home care? Yes if going home  Face to face time: 20 minutes    Treatment Plan:     LUQ Colostomy pouching plan:   Pouching system: ostomy supplies pouches: [place] FECAL ([id number])   Accessories used: [facility] ostomy  "accessories: 2\" Cera Barrier Ring (638689), Powder (684030) and M9 Drops ([id number])   Frequency of pouch changes: Three times a week  WOC follow up plan: Daily Monday-Friday (as able)  Bedside RN interventions: Change pouch PRN if leaking using the supplies above, Empty pouch when 1/3 to 1/2 full, ensure to clean pouch outlet after emptying to prevent odor, Notify WOC for ongoing pouch leakage and Document stoma appearance and output volume, color, and consistency every shift      Orders: Reviewed    DATA:     Current support surface: Standard  Low air loss (YONAS pump, Isolibrium, Pulsate, skin guard, etc)  Containment of urine/stool: Indwelling catheter and Colostomy pouch  BMI: Body mass index is 27.26 kg/m .   Active diet order: Orders Placed This Encounter      Combination Diet Regular Diet     Output: I/O last 3 completed shifts:  In: 1901 [P.O.:720; NG/GT:1181]  Out: 550 [Stool:550]     Labs:   Recent Labs   Lab 07/19/23  0516   HGB 9.3*   WBC 4.8     Pressure injury risk assessment:   Sensory Perception: 4-->no impairment  Moisture: 4-->rarely moist  Activity: 2-->chairfast  Mobility: 2-->very limited  Nutrition: 3-->adequate  Friction and Shear: 2-->potential problem  Lee Score: 17    HOA Golden RN NOELLE Smiley [facility] [employer]  Dept. Office Number: [phone]    "

## 2023-07-21 NOTE — TELEPHONE ENCOUNTER
[facility] Call Center    Phone Message    May a detailed message be left on voicemail: yes     Reason for Call: Other: [facility], please advise on getting this scheduled to the [facility] nurses station, ask for charge nurse, thank you     Action Taken: Other: pulm    Travel Screening: Not Applicable

## 2023-07-21 NOTE — PLAN OF CARE
Pt. A/O x4. VSS on room air. 6/10 pain in lower back and ostomy site. PRN tylenol given x2. T-pump applied on back. Chest xray completed today- pleural effusion slightly bigger than previous chest xray. Denies SOB. Pt on nebs. Lung sounds diminished. Pt's trach is capped- pending removal. Ostomy patent- bag changed today per WOC. NG removed. Tolerating easy to chew R diet. Pt assist of 2 with a lift or delfin steady. Pt weak in BLE with bilateral foot drop. Neurology consulted with back MRI pending. PT, OT, Speech, WOC, and SW following. Plan to discharge to TCU when stable. Will continue to follow POC.

## 2023-07-21 NOTE — CONSULTS
Neurology Consult Note  [facility]  [2023]     Admission Date:  2023  Hospital Day: 32  Code Status: Full Code    Patient: Lara Melendez     : 1965  MRN: [id number]     CC:      Chief Complaint   Patient presents with     Shortness of Breath       Consult Request:  Referring Provider:  Albin Navarro MD    Indication for Consultation:   Which Neurology Group will follow this patient? [facility]   Patient to be seen Routine within 24 hrs   Reason for Consult Please eval. re. bilateral foot drop.  Just recently regained use of both hands.  Thank you, DEQUAN Colby MD   Note: Specific question for consultant   Requesting provider? Hospitalist (if different from attending physician)     Primary Care Provider:  Sudhir Carroll MD           HPI:  Lara Melendez is a 57 year old year old woman admitted 32 days ago. I have been asked to consult on her case for bilateral foot drop. She reports noting the bilateral ankle dorsiflexion weakness since awakening from sedation in the ICU. She can stand with assistance, but has only trace toe/ankle dorsiflexion bilateral. She denies pain in the her legs, but has noted markedly increased pain in her low back since lying on her back in the ICU. She denies chewing or swallowing difficulties.    A complete review of symptoms was performed including vascular, infectious, cardiovascular, pulmonary, gastrointestinal, endocrinological, hematologic, dermatologic, musculoskeletal, and neurological. All were normal except as above.    PAST MEDICAL HISTORY:  Allergy:   Allergies   Allergen Reactions     Sulfa Antibiotics      Doxycycline Other (See Comments)     Develops chest tightness  Intolerance not allergy     Penicillins Rash     Generalized rash  Rash, Generalized       Tobacco:   History   Smoking Status     Never   Smokeless Tobacco     Never     Alcohol: Social History    Substance and Sexual Activity       Alcohol use: Yes        Comment: occ      MEDICATIONS:       Currently Scheduled Medications     amiodarone  200 mg Oral or Feeding Tube Daily     apixaban ANTICOAGULANT  5 mg Per Feeding Tube BID     atorvastatin  20 mg Per Feeding Tube Daily     budesonide  0.5 mg Nebulization BID     diltiazem  60 mg Oral Q6H ELZBIETA     ipratropium  0.5 mg Nebulization 4x daily     levalbuterol  0.63 mg Nebulization 4x Daily     levothyroxine  112 mcg Per Feeding Tube Daily     magnesium oxide  400 mg Oral or Feeding Tube Daily     menthol   Transdermal Q8H     multivitamin w/minerals  1 tablet Oral Daily     nicotine  1 patch Transdermal Daily     nicotine   Transdermal Q8H     PARoxetine  20 mg Per Feeding Tube Daily     sodium chloride (PF)  3 mL Intracatheter Q8H          Home Medications  Medications Prior to Admission   Medication Sig Dispense Refill Last Dose     albuterol (PROAIR HFA/PROVENTIL HFA/VENTOLIN HFA) 108 (90 Base) MCG/ACT inhaler Inhale 2 puffs into the lungs every 4 hours as needed for shortness of breath / dyspnea or wheezing Inhale 1-2 Puffs every 4 hours as needed for Wheezing. 18 g 0      albuterol (PROVENTIL) (2.5 MG/3ML) 0.083% neb solution Take 1 vial (2.5 mg) by nebulization every 4 hours as needed for shortness of breath or wheezing 30 mL 0      apixaban ANTICOAGULANT (ELIQUIS) 5 MG tablet Take 1 tablet (5 mg) by mouth 2 times daily 60 tablet 0      atorvastatin (LIPITOR) 20 MG tablet Take 20 mg by mouth daily        clonazePAM (KLONOPIN) 0.5 MG tablet Take 0.5 mg by mouth daily        cyclobenzaprine (FLEXERIL) 5 MG tablet Take 1 tablet (5 mg) by mouth every 8 hours as needed for muscle spasms 30 tablet 0      diclofenac (VOLTAREN) 1 % topical gel Apply 2 g topically 4 times daily 100 g 0      diltiazem ER COATED BEADS (CARDIZEM CD/CARTIA XT) 240 MG 24 hr capsule Take 1 capsule (240 mg) by mouth daily 30 capsule 0      fluticasone-salmeterol (ADVAIR-HFA) 230-21 MCG/ACT inhaler Inhale 2 puffs into the  lungs 2 times daily 12 g 0      ipratropium - albuterol 0.5 mg/2.5 mg/3 mL (DUONEB) 0.5-2.5 (3) MG/3ML neb solution Take 1 vial (3 mLs) by nebulization every 6 hours as needed for shortness of breath / dyspnea or wheezing 90 mL 1      levothyroxine (SYNTHROID/LEVOTHROID) 112 MCG tablet Take 112 mcg by mouth daily        nicotine (NICODERM CQ) 21 MG/24HR 24 hr patch Place 1 patch onto the skin daily 28 patch 0      PARoxetine (PAXIL) 20 MG tablet Take 20 mg by mouth daily        [DISCONTINUED] losartan (COZAAR) 25 MG tablet Take 1 tablet (25 mg) by mouth daily 30 tablet 1      [DISCONTINUED] predniSONE (DELTASONE) 20 MG tablet Take 60 mg for 2 days, then 40 mg for 3 days, then 20 mg for 3 days, then 10 mg for 3 days, then stop 20 tablet 0      [DISCONTINUED] tiotropium (SPIRIVA RESPIMAT) 2.5 MCG/ACT inhaler Inhale 2 puffs into the lungs daily as needed        MEDICAL HISTORY  Past Medical History:   Diagnosis Date     Anxiety      COPD (chronic obstructive pulmonary disease) - 2L home O2      Diverticulitis of colon      Hypercholesterolemia      Hypertension      Hypothyroidism      Infection due to 2019 novel coronavirus 5/14/2022     Paroxysmal atrial fibrillation      Psoriasis      Pulmonary nodule - left upper lobe      SURGICAL HISTORY  Past Surgical History:   Procedure Laterality Date     CHOLECYSTECTOMY       COLOSTOMY N/A 7/12/2023    Procedure: OPENING DIVERTING COLOSTOMY;  Surgeon: Jaspal Rashid MD;  Location: [facility] OR     INCISION AND DRAINAGE MANDIBLE, COMBINED Left 01/10/2022    Procedure: INCISION AND DRAINAGE, MANDIBLE;  Surgeon: Jn Feliz DDS;  Location: [facility] OR     INCISION AND DRAINAGE MANDIBLE, COMBINED Left 02/04/2022    Procedure: INCISION AND DRAINAGE, MANDIBLE;  Surgeon: Taylor Ortez DDS;  Location: [facility] OR     TOOTH EXTRACTION       Vocal Cord surgery       FAMILY HISTORY    Family History   Problem Relation Age of Onset     Deep Vein Thrombosis (DVT) Mother      Hypertension Mother   "    SOCIAL HISTORY  Social History     Socioeconomic History     Marital status:    Tobacco Use     Smoking status: Never     Smokeless tobacco: Never   Substance and Sexual Activity     Alcohol use: Yes     Comment: occ     Drug use: Never     Sexual activity: Not Currently       GENERAL EXAMINATION    She is a middle-aged, well-developed, well-nourished woman, 5' 3\" and 153 lbs 14.1 oz. Blood pressure is 128/65. Her peripheral pulses are intact at 97 and regular. She has no carotid bruits. Conjunctivae are normal. Her oropharynx is without lesions or inflammation. She has no pretibial edema, rashes, or unusual lesions. Nail examination shows no clubbing, cyanosis, or deformities. Respiratory examination is unremarkable, with rate of 20, unlabored. She is afebrile.         Height: 160 cm (5' 3\")    Temp: 98.3  F (36.8  C)   Weight: 69.8 kg (153 lb 14.1 oz)   Temp src: Oral         BP: 128/65         Estimated body mass index is 27.26 kg/m  as calculated from the following:    Height as of this encounter: 1.6 m (5' 3\").    Weight as of this encounter: 69.8 kg (153 lb 14.1 oz).    Resp: Resp: 20   SpO2: SpO2: 94 %   O2 D: O2 Device: None (Room air)     NEUROLOGICAL EXAMINATION  Mental Status:  She is awake, alert, and oriented X3. Her speech is spontaneous and fluent. She has no dysarthria or aphasia. Short- and long-term memory are intact. Fund of knowledge is appropriate.    Station and Gait: She can take steps with assistance but has notable bilateral ankle instability.    Skull and Spine: Her head is atraumatic. She has marked tenderness to palpation over her L5 DSP and bilateral L5 nerve root exits.    Cranial Nerves: Her visual fields are full to confrontation. Extraocular movements are intact. She has no nystagmus in the horizontal or vertical planes. Her face is symmetric at rest and with movement. She has no ptosis. Her tongue is midline. Hearing is intact.    Motor: Muscle bulk is reduced throughout. " She has foot/toe droop even lying on her back in bed. She has trace toe/ankle dorsiflexion bilaterally. Curiously, she has relatively preserved foot eversion strength bilaterally, but she has marked foot inversion weakness bilaterally in addition to the ankle dorsiflexion weakness. She has moderate plantar flexion weakness, less severe than ankle dorsiflexion weakness. She also has moderate knee extension weakness bilaterally.    Sensory: Sensation is markedly reduced in both legs, with reduced cold perception proximal to he knees. Her right leg has more dense loss thatn her left leg. Her sensation is relatively preserved in her arms.     Coordination: She has no resting, postural, or action tremor.     LABORATORY RESULTS    SMA-7:  Recent Labs   Lab Test 07/21/23  0626 07/21/23  0519 07/20/23  2235 07/20/23  0753 07/20/23  0748 07/19/23  0825 07/19/23  0516 07/18/23  0749 07/18/23  0622 07/17/23  0821 07/17/23  0412 07/16/23  0839 07/16/23  0558   NA  --   --   --   --   --   --  134*  --  135*  --  137  --  136   POTASSIUM 4.0  --   --   --  3.7  --  3.7  --  3.5  --  3.7   < > 3.4   CHLORIDE  --   --   --   --   --   --  103  --  103  --  104  --  104   CO2  --   --   --   --   --   --  23  --  23  --  24  --  24   GLC  --  125* 162* 143*  --  113* 144*   < > 121*   < > 114*   < > 126*   BUN  --   --   --   --   --   --  9.0  --  7.8*  --  9.3  --  7.9*   CR 0.38*  --   --   --   --   --  0.39*  --  0.42*  --  0.41*  --  0.41*   EDIN  --   --   --   --   --   --  8.4*  --  8.2*  --  8.0*  --  7.9*    < > = values in this interval not displayed.     CMP:  Recent Labs   Lab 07/21/23  0626 07/20/23  0748 07/19/23  0516 07/18/23  0622 07/17/23  0412 07/16/23  0558   EDIN  --   --  8.4* 8.2* 8.0* 7.9*   MAG 1.7 1.7 1.8 1.8 1.9 1.8   PHOS 3.7 3.5 3.0 2.8 3.0 2.8     CBC:    Recent Labs   Lab 07/21/23  0626 07/19/23  0516 07/18/23  0622 07/17/23 0412 07/16/23  0558   WBC  --  4.8 4.7 5.0 4.5   RBC  --  2.94* 2.82* 2.71*  2.66*   HGB  --  9.3* 8.8* 8.5* 8.3*   HCT  --  28.5* 27.7* 26.8* 26.6*   MCV  --  97 98 99 100    227 208 209 212   )@  @LABALLVALUES(WBC:1,HGB:1,HCT:1,PLT:1,MCV:1,MCH:1,MCHC:1,RDW:1,NEUT  P:1,LYMPHP:1,MONOSP:1,EOSP:1,BASOP:1,NEUTABS:1,LYMPHABS:1,MONOABS:  1,EOSABS:1,BASOABS:1)@  No results for input(s): IRON, IRONSAT, RETICABSCT, RETP, FEB, TINA, FOLIC, EPOE, MORPH in the last 168 hours.  U/A:    No components found for: URINE    Recent Labs   Lab Test 06/28/23  1001   COLOR Yellow   APPEARANCE Cloudy*   URINEGLC Negative   URINEBILI Negative   URINEKETONE Negative   SG 1.010   UBLD Large*   URINEPH 5.5   PROTEIN 10*   NITRITE Negative   LEUKEST Large*   RBCU >182*   WBCU >182*     No results found for: MICROALBUMIN, CREATCONC  Cholesterol Panel:  Lab Results   Component Value Date    TRIG 111 07/08/2023     Lipase: No results found for: LIPASE  HgA1c:   Hemoglobin A1C   Date Value Ref Range Status   07/21/2023 5.5 <5.7 % Final     Comment:     Normal <5.7%   Prediabetes 5.7-6.4%    Diabetes 6.5% or higher     Note: Adopted from ADA consensus guidelines.     CRP:   Lab Results   Component Value Date    CRP <2.9 05/17/2022    CRP <2.9 05/16/2022     Unresulted Labs Ordered in the Past 30 Days of this Admission     Date and Time Order Name Status Description    7/8/2023  8:09 AM Prepare red blood cells (unit) Preliminary     7/7/2023  3:26 PM Nocardia culture - BAL Site 1 Preliminary     7/7/2023  3:26 PM Fungus Culture, non-blood - BAL Site 1 Preliminary     7/7/2023  3:26 PM Acid-Fast Bacilli Culture and Stain In process           IMAGING RESULTS   XR Abdomen Port 1 View    Result Date: 7/20/2023  EXAM: XR ABDOMEN PORT 1 VIEW LOCATION: [facility] DATE: 7/20/2023 INDICATION: NG tube pulled back 5 cm to relieve kink. X-ray to confirm NG in correct place and can be used for tube feedings this evening. COMPARISON: LIZZY 07/20/2023.     IMPRESSION: Enteric feeding tube again identified. Its  tip is in the region of the proximal jejunum, but is still folded upon itself. Small gas distends small and large bowel loops.    XR Abdomen Port 1 View    Result Date: 7/20/2023  ABDOMEN PORTABLE ONE VIEW   7/20/2023 2:12 PM HISTORY: Confirming NG placement is correct before given medications and feeds. COMPARISON: None available     IMPRESSION: Upright views of the abdomen were obtained. Feeding tube distal tip loops in the distal duodenum and appears to be kinked, recommend slight repositioning. Right upper quadrant post cholecystectomy clips. Nonobstructive bowel gas pattern. TENZIN SHEPHERD MD   SYSTEM ID:  [id number]    CT Abdomen Pelvis w Contrast    Result Date: 7/11/2023  EXAM: CT ABDOMEN PELVIS W CONTRAST LOCATION: [facility] DATE: 7/11/2023 INDICATION: abdominal pain nausea, distension, strong concern for SBO COMPARISON: None. TECHNIQUE: CT scan of the abdomen and pelvis was performed following injection of IV contrast. Multiplanar reformats were obtained. Dose reduction techniques were used. CONTRAST: 78mL Isovue 370 FINDINGS: LOWER CHEST: Small left effusion with atelectasis. Trace right effusion with atelectasis. HEPATOBILIARY: Liver is within normal limits. Cholecystectomy. PANCREAS: Normal. SPLEEN: Normal. ADRENAL GLANDS: Normal. KIDNEYS/BLADDER: No significant mass, stone, or hydronephrosis. BOWEL: The colon is dilated with the cecum measuring approximately 6.6 m, ascending colon measuring approximately 7.7 cm, transverse colon measuring 7.4 cm and descending colon measuring 5.6 cm. The colon abruptly ends in the mid sigmoid colon where there is extensive diverticulosis. There is a suggestion of pneumatosis within both the ascending and descending colon. LYMPH NODES: Normal. VASCULATURE: Unremarkable. PELVIC ORGANS: Bladder is decompressed. Uterus within normal limits. The fluid noted within the pelvis. Mild anasarca involving the soft tissues of the pelvis. MUSCULOSKELETAL:  Degenerative changes of the spine.     IMPRESSION: 1.  The colon is dilated from the cecum to the mid sigmoid colon where there is abrupt termination. This maybe related to a diverticular stricture or possible mass. There is a suggestion of pneumatosis which is nonspecific although could be related to ischemia. Surgical consultation is suggested.    XR Chest Port 1 View    Result Date: 7/11/2023  EXAM: XR CHEST PORT 1 VIEW LOCATION: [facility] DATE: 7/11/2023 INDICATION: NG inadvertently went into the lungs. COMPARISON: Chest radiograph 07/10/2023.     IMPRESSION: Unchanged tracheostomy tube with tip at the thoracic inlet. Feeding tube descends along the course of the esophagus with tip below the diaphragm and out of the field-of-view. Unchanged right PICC with tip in the lower SVC. Lung volumes remain low with slight elevation of the left hemidiaphragm and adjacent left basilar atelectasis. Multiple small opacities at the left apex are unchanged and could either represent calcifications or aspirated barium. No new airspace opacities, pleural effusions, or pneumothorax. Stable, nonenlarged cardiac silhouette.     XR Abdomen Port 1 View    Result Date: 7/11/2023  XR ABDOMEN PORT 1 VIEW   7/11/2023 4:24 PM HISTORY: Ileus unable to pass NG at bedside-need NG not feeding tube COMPARISON: Abdominal x-ray on 7/10/2023.     IMPRESSION: Single frontal view of the lower chest and upper abdomen was obtained. Feeding tube distal tip projects over the third portion of the duodenum. Nasogastric tube tip and sideholes project over the left main bronchus, recommend repositioning and repeating the x-ray. Multiple gas distended bowel loops in the visualized upper abdomen. TENZIN SHEPHERD MD   SYSTEM ID:  [id number]    XR Chest Port 1 View    Result Date: 7/10/2023  CHEST PORTABLE ONE VIEW   7/10/2023 2:18 PM HISTORY: Took out chest tube, evaluate for any complications. COMPARISON: Chest x-ray on 7/10/2023.      IMPRESSION: Single AP view of the chest was obtained. Tracheostomy tube tip projects over mid thoracic trachea approximately 4.5 cm from the stevie. Right upper extremity PICC tip projects over the high right atrium. Cardiomediastinal silhouette is within normal limits. Left basilar/retrocardiac pulmonary opacities, could represent small pleural effusion or atelectasis/infection. No significant right pleural effusion. No significant pneumothorax. TENZIN SHEPHERD MD   SYSTEM ID:  [id number]    XR Abdomen Port 2 Views    Result Date: 7/10/2023  XR PORTABLE ABDOMEN TWO VIEWS  7/10/2023 10:50 AM History: Flat and decubitus. Bloated/nauseated, no bowel movement, quiet bowel sounds. Evaluate for ileus Comparison: Abdominal x-ray on 6/26/2023     IMPRESSION: Supine and left decubitus views of the abdomen and pelvis were obtained. Feeding tube distal tip projects over the third portion of the duodenum. Dilated gas-filled colonic loops. A few air-fluid levels seen on the decubitus view, could represent ileus versus small-bowel obstruction. No evidence of free peritoneal or portal venous gas. TENZIN SHEPHERD MD   SYSTEM ID:  [id number]    XR Chest Port 1 View    Result Date: 7/10/2023  CHEST PORTABLE ONE VIEW  July 10, 2023 9:09 AM HISTORY: Recent trach, history of pneumonia and small pneumothorax. Chest tube in place. COMPARISON: Chest x-ray on 7/7/2023.     IMPRESSION: Single AP view of the chest was obtained. Tracheostomy tube tip projects over mid thoracic trachea approximately 6 cm from the stevie. Left chest tube remains in place. No significant pneumothorax visualized. An enteric tube crosses the diaphragm with the distal tip outside the field-of-view. Right upper extremity PICC tip projecting over low SVC. Cardiomediastinal silhouette is within normal limits. Mild left basilar/retrocardiac pulmonary opacities, could represent atelectasis versus infection. No significant pleural effusion. TENZIN SHEPHERD MD    SYSTEM ID:  [id number]    US Upper Extremity Venous Duplex Bilat    Result Date: 7/8/2023  EXAM: US UPPER EXTREMITY VENOUS DUPLEX BILATERAL LOCATION: [facility] DATE: 7/8/2023 INDICATION: Shortness of breath, intubated, immobility, PICC line. COMPARISON: None. TECHNIQUE: Venous Duplex ultrasound of both upper extremities with (when possible) and without compression, augmentation, and duplex. Color flow and spectral Doppler with waveform analysis performed. FINDINGS: Ultrasound includes evaluation of the internal jugular veins, innominate veins, subclavian veins, axillary veins, and brachial veins. The superficial cephalic and basilic veins were also evaluated where seen. RIGHT: No deep venous thrombosis. No superficial thrombophlebitis. LEFT: No deep venous thrombosis. No superficial thrombophlebitis.     IMPRESSION: 1.  No deep venous thrombosis in the bilateral upper extremities.    US Lower Extremity Venous Duplex Bilateral    Result Date: 7/8/2023  EXAM: US LOWER EXTREMITY VENOUS DUPLEX BILATERAL LOCATION: [facility] DATE: 7/8/2023 INDICATION: Leg pain. COMPARISON: None. TECHNIQUE: Venous Duplex ultrasound of bilateral lower extremities with and without compression, augmentation and duplex. Color flow and spectral Doppler with waveform analysis performed. FINDINGS: Exam includes the common femoral, femoral, popliteal veins as well as segmentally visualized deep calf veins and greater saphenous vein. RIGHT: No deep vein thrombosis. No superficial thrombophlebitis. No popliteal cyst. LEFT: No deep vein thrombosis. No superficial thrombophlebitis. No popliteal cyst.     IMPRESSION: 1.  No deep venous thrombosis in the bilateral lower extremities.    XR Chest Port 1 View    Result Date: 7/7/2023  XR CHEST PORT 1 VIEW 7/7/2023 4:00 PM HISTORY: Mechanical ventilation, paroxysmal atrial fibrillation, tracheostomy tube placement COMPARISON: X-ray 7/6/2023     IMPRESSION:  Interval placement of a tracheostomy tube with the tip located in satisfactory position 4.3 cm above the stevie. Stable satisfactory right PICC line position. Enteric tube with the distal visualized portion in the gastric lumen. Stable left-sided pleural drainage catheter. No definite pneumothorax. Increased left basilar pulmonary opacities which may reflect atelectasis or pneumonia. The right lung is clear. Normal heart size. No vascular congestion. KAYLA HUANG MD   SYSTEM ID:  [id number]    XR Chest Port 1 View    Result Date: 7/6/2023  EXAM: XR CHEST PORT 1 VIEW LOCATION: [facility] DATE: 7/6/2023 INDICATION: Endotracheal tube positioning COMPARISON: 07/04/2023     IMPRESSION: The ET tube and right PICC as well as the enteric tube are all unchanged in position. Left basilar subsegmental atelectasis is again noted, similar to prior exam.    XR Chest Port 1 View    Result Date: 7/4/2023  EXAM: XR CHEST PORT 1 VIEW LOCATION: [facility] DATE: 7/4/2023 INDICATION: ventilator COMPARISON: 07/02/2023     IMPRESSION: The ET tube is unchanged in position. The right PICC and enteric tube are unchanged in position. The heart is unchanged in size and contour. There is mild central pulmonary venous congestion. Mild interstitial edema seen in the lung bases bilaterally.    XR Chest Port 1 View    Result Date: 7/2/2023  EXAM: XR CHEST PORT 1 VIEW LOCATION: [facility] DATE: 7/2/2023 INDICATION: ETT placement COMPARISON: None.     IMPRESSION: Endotracheal tube tip 5.4 cm above the stevie in good position. Heart size and mediastinum are unchanged. Right PICC line with tip in the distal SVC. Increased left lower lobe opacification suggesting increasing subsegmental atelectasis with a left pleural effusion. No pneumothorax. Right lung is clear.    XR Chest Port 1 View    Result Date: 6/30/2023  CHEST ONE VIEW  6/30/2023 12:23 PM HISTORY: Increasing airway  pressure with history of pneumothorax. COMPARISON: June 27, 2023     IMPRESSION: Endotracheal tube tip 5.5 cm from the stevie. Right PICC tip in the low SVC. No pneumothorax. GHAZALA ORELLANA MD   SYSTEM ID:  [id number]    XR Abdomen Port 1 View    Result Date: 6/28/2023  XR ABDOMEN PORT 1 VIEW   6/28/2023 4:28 PM HISTORY: confirm placement of feeding tube COMPARISON: Abdominal radiograph 6/21/2023     IMPRESSION: No radiographic evidence of bowel obstruction. Interval placement of feeding tube with tip overlying the expected position of the ligament of Treitz. New left basilar pulmonary opacity, could represent atelectasis or developing airspace consolidation, consider dedicated chest radiograph. Bones are unchanged. BRYON MARSHALL MD   SYSTEM ID:  [id number]    XR Chest Port 1 View    Result Date: 6/27/2023  CHEST PORTABLE 1 VIEW   6/27/2023 10:18 AM HISTORY: Status post chest tube placement for pneumothorax. COMPARISON: Chest x-ray on 6/26/2023.     IMPRESSION: Single AP view of the chest was obtained. Endotracheal tube tip projects over the midthoracic trachea approximately 4 cm from the stevie. Enteric tube crosses the diaphragm with distal tip outside the field of view. Right upper extremity PICC tip projects over the high right atrium. Cardiomediastinal silhouette is within normal limits. Significant interval decrease/almost complete resolution of the previously seen left pneumothorax status post chest tube placement. Multiple small radiodensity projects over the left lung apex, likely represent areas of calcification seen on prior chest CTs. Otherwise, no suspicious focal pulmonary opacities. No significant pleural effusion or pneumothorax. TENZIN SHEPHERD MD   SYSTEM ID:  [id number]    CT Chest Tube with Cath Placement    Result Date: 6/26/2023  CT CHEST TUBE WITH CATH PLACEMENT  6/26/2023 3:14 PM HISTORY:  Patient is intubated and has a spontaneous left pneumothorax. COMPARISON: Chest x-ray dated 6/26/2023  FINDINGS: After obtaining informed consent, the patient was placed in a supine position on the CT table. The left anterior chest was prepped and draped in the usual sterile manner. 1% lidocaine was injected for local anesthesia. Under CT guidance, using a 5 Icelandic Yueh needle, access into the left pleural space was obtained. A wire was curled in the pleural space. Over the wire, an 8 Icelandic locking pigtail catheter was placed. Air was suctioned out of the left thorax. Follow-up CT scan showed interval decrease in size of the left pneumothorax. The catheter was sutured to the patient's skin and hooked to a pleura vac suction apparatus. The patient tolerated the procedure well. There were no immediate postprocedure complications.     IMPRESSION: CT-guided left chest tube placed as above. Radiation dose for this scan was reduced using automated exposure control, adjustment of the mA and/or kV according to patient size, or iterative reconstruction technique. RODERICK EARL MD   SYSTEM ID:  [id number]    XR Chest Port 1 View    Result Date: 6/26/2023  CHEST ONE VIEW  6/26/2023 1:10 PM HISTORY: Pneumothorax, followup. COMPARISON: June 26, 2023 at 9:15 AM.     IMPRESSION: Small to moderate left pneumothorax is not significantly changed since the comparison study. Right PICC unchanged. No acute infiltrates. Enteric tube tip approximately 4.6 cm from the stevie. GHAZALA ORELLANA MD   SYSTEM ID:  [id number]    XR Chest Port 1 View    Result Date: 6/26/2023  CHEST ONE VIEW  6/26/2023 9:29 AM HISTORY: Increasing oxygen requirements and airway pressure. COMPARISON: June 23, 2023     IMPRESSION: Endotracheal tube tip 4.3 cm from the stevie. Right PICC line stable. Minimal pleural fluid or pleural thickening bilaterally similar to previous. Small-to-moderate pneumothorax on the left new from previous. Results called to the patient's nurse on June 26, 2023 at 9:44 AM. GHAZALA ORELLANA MD   SYSTEM ID:  [id number]    XR Chest Port 1  View    Result Date: 6/23/2023  CHEST ONE VIEW  6/23/2023 8:37 AM HISTORY: Worsening ABGs and respiratory status on vent. COMPARISON: June 21, 2023     IMPRESSION: Endotracheal tube tip 5 cm from the stevie. Right-sided PICC tip in the low SVC. Trace pleural fluid or pleural thickening on the left. No acute infiltrates. Calcifications at the left apex stable. GHAZALA ORELLANA MD   SYSTEM ID:  [id number]    XR Chest Port 1 View    Result Date: 6/21/2023  EXAM: XR CHEST PORT 1 VIEW LOCATION: [facility] DATE: 6/21/2023 INDICATION: check ETT position COMPARISON: 06/21/2023     IMPRESSION: Endotracheal tube in good position 4 cm above the stevie. NG tube courses below the diaphragm. The lungs are clear.    _____________________________________________________________________________  EKG:    ASSESSMENT       1. Bilateral ankle dorsiflexion weakness in a pattern not compatible with peroneal mononeuropathies at the fibular heads, a common focal nerve entrapment in the setting of disability/prolonged bedrest. Her weakness is more consistent with bilateral L5 radiculopathies, but I suspect her severe back pain is related to marked lumbar central canal stenosis, which could also account for her more proximal leg weakness as well. My best guess is that she has chronic severe lumbar stenosis at the L4-5 and L5-S1 levels. Resolving GBS is less likely, but possible. Her symptoms are not progressing, arguing against CIDP.    RECOMMENDATIONS     1. I recommend MRI of the L-spine to rule out bilateral L5 radics, and to rule out severe L4-5 and L5-S1 stenosis.   2. If the imaging does not document pathology adequate to account for her distal more than proximal numbness and weakness, I recommend EMG/NCS of the legs to evaluate possible GBS during her ICU stay (demyelinating neuropathy) versus resolving ICU neuropathy (axonal neuropathy).  3.       Carl Reilly M.D., Ph.D.    [facility]

## 2023-07-21 NOTE — PLAN OF CARE
"Care from [id number]    Inpatient Progress Note:  For complete assessment see flow sheet documentation.    /68 (BP Location: Left arm)   Pulse 95   Temp 99.4  F (37.4  C) (Oral)   Resp 24   Ht 1.6 m (5' 3\")   Wt 72.1 kg (158 lb 15.2 oz)   SpO2 96%   BMI 28.16 kg/m         Orientation: A&OX4  Pain status: Moderate generalized pain, PRN tylenol and flexril given  Activity: Ax2 with lift  Peripheral edema: Generalized mild edema   Resp: Trach capped, site CDI, LS diminished  Cardiac: WNL  GI: Colostomy bag in place, BS active all quadrants, abdomen soft and non distended, NG tube in Left nare  : WNL  LDA: PIV, NG tube  Infusions: TF @ 80ml/hr  Diet: Regular  Consults: OT, PT, Speech, Pulm, WOC, Nutrition  Discharge Plan: Discontinue trach and NG tube 7/21, TCU vs. ARU at discharge.     Changed tube feeding set and pump @ 0123 d/t mal function.   Will continue to monitor and provide cares.     Asmita Lozano RN    "

## 2023-07-21 NOTE — PROGRESS NOTES
"CLINICAL NUTRITION SERVICES - BRIEF NOTE    - Refer to previous RD notes.  Chart check for nutrition support patient.   - Patient has been receiving cycled/overnight EN via nasal tube while diet has been advancing and PO trending established.   - Advanced to regular diet yesterday and had 100% of 2 meals (per flowsheets) + an Ensure (per pt report).    - Ewa reports she is very motivated to eat today and plans on drinking her Ensure.  She really wants her FT out and reports it's causing her pain/irritation.  She feels she would be able to eat better w/ the tube out and notes she's just not hungry in the mornings because of fullness.  She is aware of the option of ordering small/frequent meals or snacks.  - Discontinued enteral orders (formula + protein modular), kept water flushes for tube patency.  Patient tells me she was told she could have her FT removed today and will be \"very pissed\" if this doesn't happen.  - Discussed w/ MD deferring to hospitalist team's discretion on timing of FT removal as only 1 day of PO intake trending thus far, though patient does seem motivated and likely able to meet her nutrition needs w/ use of oral supplements.  - Discussed holding of EN/no cycle tonight w/ RN.  - Continue diet per SLP, supplements as ordered, and asked nursing assistant to formally track PO intakes in flowsheets for today.  - Will continue following.      Lora Crowell RDN, LD  Clinical Dietitian  3rd floor/ICU: [phone]  All other floors: [phone]  Weekend/holiday: [phone]  Office: [phone]      "

## 2023-07-22 ENCOUNTER — APPOINTMENT (OUTPATIENT)
Dept: OCCUPATIONAL THERAPY | Facility: CLINIC | Age: 58
End: 2023-07-22
Payer: COMMERCIAL

## 2023-07-22 ENCOUNTER — APPOINTMENT (OUTPATIENT)
Dept: SPEECH THERAPY | Facility: CLINIC | Age: 58
End: 2023-07-22
Payer: COMMERCIAL

## 2023-07-22 ENCOUNTER — APPOINTMENT (OUTPATIENT)
Dept: PHYSICAL THERAPY | Facility: CLINIC | Age: 58
End: 2023-07-22
Payer: COMMERCIAL

## 2023-07-22 LAB
GLUCOSE BLDC GLUCOMTR-MCNC: 105 MG/DL (ref 70–99)
GLUCOSE BLDC GLUCOMTR-MCNC: 108 MG/DL (ref 70–99)
MAGNESIUM SERPL-MCNC: 1.9 MG/DL (ref 1.7–2.3)
PHOSPHATE SERPL-MCNC: 4.6 MG/DL (ref 2.5–4.5)
POTASSIUM SERPL-SCNC: 3.9 MMOL/L (ref 3.4–5.3)

## 2023-07-22 PROCEDURE — 84100 ASSAY OF PHOSPHORUS: CPT | Performed by: INTERNAL MEDICINE

## 2023-07-22 PROCEDURE — 97110 THERAPEUTIC EXERCISES: CPT | Mod: GO

## 2023-07-22 PROCEDURE — 83735 ASSAY OF MAGNESIUM: CPT | Performed by: INTERNAL MEDICINE

## 2023-07-22 PROCEDURE — 120N000001 HC R&B MED SURG/OB

## 2023-07-22 PROCEDURE — 84132 ASSAY OF SERUM POTASSIUM: CPT | Performed by: INTERNAL MEDICINE

## 2023-07-22 PROCEDURE — 36415 COLL VENOUS BLD VENIPUNCTURE: CPT | Performed by: INTERNAL MEDICINE

## 2023-07-22 PROCEDURE — 97530 THERAPEUTIC ACTIVITIES: CPT | Mod: GP | Performed by: PHYSICAL THERAPIST

## 2023-07-22 PROCEDURE — 250N000013 HC RX MED GY IP 250 OP 250 PS 637: Performed by: INTERNAL MEDICINE

## 2023-07-22 PROCEDURE — 92526 ORAL FUNCTION THERAPY: CPT | Mod: GN

## 2023-07-22 PROCEDURE — 94640 AIRWAY INHALATION TREATMENT: CPT

## 2023-07-22 PROCEDURE — 94640 AIRWAY INHALATION TREATMENT: CPT | Mod: 76

## 2023-07-22 PROCEDURE — 97110 THERAPEUTIC EXERCISES: CPT | Mod: GP | Performed by: PHYSICAL THERAPIST

## 2023-07-22 PROCEDURE — 250N000009 HC RX 250: Performed by: INTERNAL MEDICINE

## 2023-07-22 PROCEDURE — 250N000013 HC RX MED GY IP 250 OP 250 PS 637: Performed by: HOSPITALIST

## 2023-07-22 PROCEDURE — 97530 THERAPEUTIC ACTIVITIES: CPT | Mod: GO

## 2023-07-22 PROCEDURE — 250N000013 HC RX MED GY IP 250 OP 250 PS 637: Performed by: SURGERY

## 2023-07-22 PROCEDURE — 99233 SBSQ HOSP IP/OBS HIGH 50: CPT | Performed by: INTERNAL MEDICINE

## 2023-07-22 PROCEDURE — 999N000157 HC STATISTIC RCP TIME EA 10 MIN

## 2023-07-22 RX ORDER — PAROXETINE 20 MG/1
20 TABLET, FILM COATED ORAL DAILY
Status: DISCONTINUED | OUTPATIENT
Start: 2023-07-22 | End: 2023-07-28 | Stop reason: HOSPADM

## 2023-07-22 RX ORDER — LEVOTHYROXINE SODIUM 112 UG/1
112 TABLET ORAL DAILY
Status: DISCONTINUED | OUTPATIENT
Start: 2023-07-23 | End: 2023-07-28 | Stop reason: HOSPADM

## 2023-07-22 RX ORDER — MAGNESIUM OXIDE 400 MG/1
400 TABLET ORAL DAILY
Status: DISCONTINUED | OUTPATIENT
Start: 2023-07-23 | End: 2023-07-28 | Stop reason: HOSPADM

## 2023-07-22 RX ORDER — AMIODARONE HYDROCHLORIDE 200 MG/1
200 TABLET ORAL DAILY
Status: DISCONTINUED | OUTPATIENT
Start: 2023-07-23 | End: 2023-07-23

## 2023-07-22 RX ADMIN — MAGNESIUM OXIDE TAB 400 MG (241.3 MG ELEMENTAL MG) 400 MG: 400 (241.3 MG) TAB at 08:59

## 2023-07-22 RX ADMIN — LEVALBUTEROL HYDROCHLORIDE 0.63 MG: 0.63 SOLUTION RESPIRATORY (INHALATION) at 19:48

## 2023-07-22 RX ADMIN — LEVALBUTEROL HYDROCHLORIDE 0.63 MG: 0.63 SOLUTION RESPIRATORY (INHALATION) at 15:24

## 2023-07-22 RX ADMIN — PAROXETINE HYDROCHLORIDE 20 MG: 20 TABLET, FILM COATED ORAL at 14:15

## 2023-07-22 RX ADMIN — AMIODARONE HYDROCHLORIDE 200 MG: 200 TABLET ORAL at 08:59

## 2023-07-22 RX ADMIN — BUDESONIDE 0.5 MG: 0.5 INHALANT ORAL at 08:16

## 2023-07-22 RX ADMIN — DILTIAZEM HYDROCHLORIDE 60 MG: 30 TABLET, FILM COATED ORAL at 23:53

## 2023-07-22 RX ADMIN — BUDESONIDE 0.5 MG: 0.5 INHALANT ORAL at 19:48

## 2023-07-22 RX ADMIN — APIXABAN 5 MG: 5 TABLET, FILM COATED ORAL at 20:36

## 2023-07-22 RX ADMIN — NICOTINE 1 PATCH: 14 PATCH, EXTENDED RELEASE TRANSDERMAL at 09:01

## 2023-07-22 RX ADMIN — CYCLOBENZAPRINE HYDROCHLORIDE 5 MG: 5 TABLET, FILM COATED ORAL at 19:02

## 2023-07-22 RX ADMIN — IPRATROPIUM BROMIDE 0.5 MG: 0.5 SOLUTION RESPIRATORY (INHALATION) at 08:16

## 2023-07-22 RX ADMIN — IPRATROPIUM BROMIDE 0.5 MG: 0.5 SOLUTION RESPIRATORY (INHALATION) at 19:48

## 2023-07-22 RX ADMIN — ACETAMINOPHEN 650 MG: 325 TABLET, FILM COATED ORAL at 09:06

## 2023-07-22 RX ADMIN — LEVOTHYROXINE SODIUM 112 MCG: 0.11 TABLET ORAL at 09:00

## 2023-07-22 RX ADMIN — DILTIAZEM HYDROCHLORIDE 60 MG: 30 TABLET, FILM COATED ORAL at 17:20

## 2023-07-22 RX ADMIN — DILTIAZEM HYDROCHLORIDE 60 MG: 30 TABLET, FILM COATED ORAL at 00:10

## 2023-07-22 RX ADMIN — ATORVASTATIN CALCIUM 20 MG: 20 TABLET, FILM COATED ORAL at 08:59

## 2023-07-22 RX ADMIN — DILTIAZEM HYDROCHLORIDE 60 MG: 30 TABLET, FILM COATED ORAL at 12:23

## 2023-07-22 RX ADMIN — CYCLOBENZAPRINE HYDROCHLORIDE 5 MG: 5 TABLET, FILM COATED ORAL at 08:59

## 2023-07-22 RX ADMIN — LEVALBUTEROL HYDROCHLORIDE 0.63 MG: 0.63 SOLUTION RESPIRATORY (INHALATION) at 08:16

## 2023-07-22 RX ADMIN — IPRATROPIUM BROMIDE 0.5 MG: 0.5 SOLUTION RESPIRATORY (INHALATION) at 15:24

## 2023-07-22 RX ADMIN — APIXABAN 5 MG: 5 TABLET, FILM COATED ORAL at 08:59

## 2023-07-22 RX ADMIN — IPRATROPIUM BROMIDE 0.5 MG: 0.5 SOLUTION RESPIRATORY (INHALATION) at 12:26

## 2023-07-22 RX ADMIN — LEVALBUTEROL HYDROCHLORIDE 0.63 MG: 0.63 SOLUTION RESPIRATORY (INHALATION) at 12:26

## 2023-07-22 RX ADMIN — ACETAMINOPHEN 650 MG: 325 TABLET, FILM COATED ORAL at 17:24

## 2023-07-22 RX ADMIN — DILTIAZEM HYDROCHLORIDE 60 MG: 30 TABLET, FILM COATED ORAL at 06:19

## 2023-07-22 ASSESSMENT — ACTIVITIES OF DAILY LIVING (ADL)
ADLS_ACUITY_SCORE: 27
ADLS_ACUITY_SCORE: 30
ADLS_ACUITY_SCORE: 27
ADLS_ACUITY_SCORE: 30
ADLS_ACUITY_SCORE: 27

## 2023-07-22 NOTE — PROGRESS NOTES
Speech Language Therapy Discharge Summary    Reason for therapy discharge:    All goals and outcomes met, no further needs identified.    Progress towards therapy goal(s). See goals on Care Plan in [facility] electronic health record for goal details.  Goals met    Therapy recommendation(s):    No further therapy is recommended. Recommend regular diet and thin liquids with pt intermittently completing a supraglottic swallow (swallow, cough/throat clear, re swallow). The patient should take small bites/sips at a slow pace when upright/alert; PO when trach is capped. The patient meets criteria for decannulation from SLP standpoint if she continues good tolerance without supplemental O2 or deep suctioning needs.

## 2023-07-22 NOTE — PROGRESS NOTES
End of Shift Summary  For vital signs and complete assessments, please see documentation flowsheets.     Pertinent assessments: Pt A&O, on RA, c/o back pain 6/10 Tylenol and Flexeril given. Using heating pad.Trach in place, capped, CDI. Up to BSC x 2, voiding without difficulty. Ostomy in place, passing flatus, small amount of liquid stool.     Major Shift Events: MRI completed     Treatment Plan: K+/Mg/Phos protocol, pain management    Yes

## 2023-07-22 NOTE — PLAN OF CARE
Goal Outcome Evaluation:      Plan of Care Reviewed With: patient      End of Shift Summary  For vital signs and complete assessments, please see documentation flowsheets.     Pertinent assessments: Pt A&O, on RA, VSS, complained of back pain 5/10 Tylenol given. Using heating pad.Trach in place, capped, CDI. RT did her nebs, & track cares, Up to bedside commode x 2, sitting up in the chair voiding without difficulty. Ostomy in place, passing flatus, 2 BM, this shift, PT recommended we use sera study. On a regular diet, her daughters brought food from outside, she tolerated  well, speech therapy saw her this shift.     Major Shift Events: PT/with sera study    Treatment Plan: Potassium protocol, pain management

## 2023-07-22 NOTE — PROGRESS NOTES
[facility]    Hospitalist Progress Note    Date of Service (when I saw the patient): 07/21/2023    Assessment & Plan   Lara Melendez is a pleasant 57 year old woman with a history of COPD (on PRN home oxygen 2-3 L/nc), tobacco dependence, HTN, dyslipidemia, paroxysmal A-fib (on apixaban), anxiety, hypothyroidism, and psoriasis admitted on 6/20/2023 with SOB and wheezing.     Per prior: symptoms started a few days prior to admission; she called her Pulmonologist who started her on azithromycin and prednisone.  - symptoms continued to worsen so EMS was called and she was brought to the ER.  - respiratory status continued to decline and she required intubation the day after admission.  - has been treated for community-acquired pneumonia along with COPD exacerbation with steroids and antibiotics.  - while on the ventilator, had significant bronchospasm and high airway pressures requiring deep sedation and paralysis.    - [facility] was actually consulted for possibility of ECMO but she was not an appropriate candidate.  - was also found to have a large pneumothorax on 6/26 with chest tube placed by IR (subsequently removed on 7/10).  - has had recurrent A-fib with RVR requiring both cardioversion and diltiazem with amiodarone.  - was continued on treatment with steroids, nebs, antibiotics and aggressive diuresis.  - Paralytics were able to be weaned off but she required tracheostomy for ventilator weaning.  - had worsening abdominal distention on 7/11.  CT scan was concerning for large bowel obstruction.    - Colorectal surgery was consulted and she underwent surgical repair and colostomy on 7/12.    Current/recent problems include:       Acute on chronic hypoxic and hypercapnic respiratory failure; resolving  COPD with acute exacerbation chronically on 2-3 L/nc prn  Left-sided pneumothorax  PNA with sputum + for klebsiella oxytoca  Pulmonary edema 2/2 fluid resuscitation for sepsis status post  tracheotomy  Complicated respiratory course-initially intubated due to progressive decline in respiratory function after admission-high airway pressures from severe bronchospasm required deep sedation including paralysis with vec.  She was also treated with 24 hours of continuous albuterol neb.  Lower respiratory track positive for Klebsiella as above along with rhinovirus.  - completed steroid course and course of IV antibiotics.  - chest tube placed by IR on 6/26 for pneumothorax; removed on 7/10.  - continue on DuoNebs.    - Pulmonology consulted earlier this admission  - IV Lasix given PRN earlier for volume overload.   - Speech is working with the patient.  - trache down-sized on 7/15;  -> reviewed planning for trache removal w/ Pulmonology today, 7/21; they recommend keeping in place for another 7-10 days; to be removed by Surgery or ENT at [facility]    - trache placed by Dr. Amin-Danilo     Generalized weakness  Physical deconditioning  Bilateral foot drop  - Neurology consult today; reviewed w/ Dr. Reilly and appreciate his input  - MRI of lower spine today  - will likely benefit from using AFO's while up standing/working w/ PT    Left pleural effusion; abnormal exam today, 7/21  - repeat CXR, 2-views if able, to evaluate (larger than on prior CXR; had chest tube earlier this admission)  - Pulmonology follow up 7/22  - add I/S     Ileus  Large bowel obstruction s/p diverting colostomy  - per prior: first tried bowel meds without improvement.  Patient had worsening abdominal distention and pain.  CT abdomen pelvis done on 7/12 showed colonic dilation with abrupt termination at the mid sigmoid colon with suggestion of pneumatosis.  Colorectal surgery was consulted.  Underwent operative repair early a.m. of 7/12.  Seem to tolerate well.   - started on tube feeds again on 7/14.  - has now had decent PO intake x 1-2 days;  - reviewed status w/ RD; will remove NG today 7/21, and observe PO intake  overnight     Paroxysmal AF/SVT  - Pta was on diltiazem and apixaban (BB being avoided due to severe COPD).  - required DCCV x 2 during this hospitalization.    - continue apixiban  - continue amiodarone     Sepsis. Elevated lactate; resolved     Hypotension; resolved.      CAUTI  Pseudomonas UTI  - Hines cath placed for critical illness, UA 6/28 with inflammatory cells with large blood.   - UCx with  K with Pseudomonas  - treated with 2 days of liberty followed by 4 days of cefepime, abx completed 7/6  - Hines removed     Hyperkalemia; corrected  Hypokalemia; corrected   Potassium bola to 5.5 and persisted in that range for a day or 2.  She received several doses of Lokelma and potassium level has now normalized and actually downtrending.  Unclear etiology.  Renal function is normal.  - PTA losartan has been on hold.     Hypernatremia  Hyponatremia, more recently.  Significant rise in sodium up to 160 with tube feed initiation.  Improved with high-dose free water via NG and D5, but remained at about 150.     Hypertension; pressures stable.  - continue current diltiazem  - Losartan discontinued earlier due to hyperkalemia     Anemia    - prior drifted down to as low as 6.8; no clear bleeding noted.  Suspect critical illness/venipuncture mediated  - transfused single unit PRBC 7/8  - 7/19 Hgb 9.3       Diet: Snacks/Supplements Adult: Magic Cup; Between Meals  Adult Formula Drip Feeding: Continuous Osmolite 1.5; Nasogastric tube; Goal Rate: 80; mL/hr; From: 7:00 PM; To: 7:00 AM; Okay to start at goal rate  Combination Diet Regular Diet    DVT Prophylaxis: DOAC  Hines Catheter: Not present  Lines: None     Cardiac Monitoring: None  Code Status: Full Code      Disposition: expected discharge in 1-3 days.      TYREE Colby MD, [facility]  Text Page (7am - 6pm)    Interval History   Reviewed care w/ RN and Ewa and her ; also w/ Dr. Reilly of Neurology.  No f/c/SOB; not OOB much yet;  "up in recliner.  Saw PT today -> but has only been able to stand briefly and is not taking any steps yet; describes inability to dorsiflex her feet, when standing or lying, but can do so partially when her legs are \"dangling\".  Says she has just regained more of the strength and some of the dexterity of both hands in the last few days.    Data reviewed today: I reviewed all new labs and imaging results over the last 24 hours.     Physical Exam   Temp: 98.7  F (37.1  C) Temp src: Oral BP: 111/57 Pulse: 80   Resp: 20 SpO2: 93 % O2 Device: None (Room air)    Vitals:    07/17/23 0600 07/20/23 0542 07/21/23 0700   Weight: 65.5 kg (144 lb 6.4 oz) 72.1 kg (158 lb 15.2 oz) 69.8 kg (153 lb 14.1 oz)     Vital Signs with Ranges  Temp:  [98.3  F (36.8  C)-99  F (37.2  C)] 98.7  F (37.1  C)  Pulse:  [80-97] 80  Resp:  [18-20] 20  BP: (111-128)/(53-69) 111/57  SpO2:  [93 %-96 %] 93 %  I/O last 3 completed shifts:  In: 1771 [P.O.:690; NG/GT:1081]  Out: 525 [Stool:525]    Constitutional: awake, no apparent distress; lying in bed  HEENT: sclerae clear; MM's moist  Respiratory: fair a/e bilaterally; decreased over Left lower 1/3; faint inspiratory rhonchi Left lower half; no wheezing  Cardiovascular: regular rate and rhythm, S1, S2 noted; no m/r/g  GI: abdomen flat, colostomy intact; + bowel sounds; soft, non-tender, non-distended  Skin/Integumen: no rashes, no cyanosis, no jaundice  Musculoskeletal: no edema  Neurologic: follows most directions well; has bilateral foot drop, with no dorsiflexion while lying in chair; sensation grossly intact in both lower legs      Medications     dextrose Stopped (07/14/23 1400)     - MEDICATION INSTRUCTIONS -         amiodarone  200 mg Oral or Feeding Tube Daily     apixaban ANTICOAGULANT  5 mg Per Feeding Tube BID     atorvastatin  20 mg Per Feeding Tube Daily     budesonide  0.5 mg Nebulization BID     diltiazem  60 mg Oral Q6H ELZBIETA     ipratropium  0.5 mg Nebulization 4x daily     levalbuterol  " 0.63 mg Nebulization 4x Daily     levothyroxine  112 mcg Per Feeding Tube Daily     magnesium oxide  400 mg Oral or Feeding Tube Daily     menthol   Transdermal Q8H     multivitamin w/minerals  1 tablet Oral Daily     nicotine  1 patch Transdermal Daily     nicotine   Transdermal Q8H     PARoxetine  20 mg Per Feeding Tube Daily     sodium chloride (PF)  3 mL Intracatheter Q8H       Data   Recent Labs   Lab 07/21/23  1750 07/21/23  0626 07/21/23  0519 07/20/23  2235 07/20/23  0753 07/20/23  0748 07/19/23  0825 07/19/23  0516 07/18/23  0749 07/18/23  0622 07/17/23  0821 07/17/23  0412   WBC  --   --   --   --   --   --   --  4.8  --  4.7  --  5.0   HGB  --   --   --   --   --   --   --  9.3*  --  8.8*  --  8.5*   MCV  --   --   --   --   --   --   --  97  --  98  --  99   PLT  --  231  --   --   --   --   --  227  --  208  --  209   NA  --   --   --   --   --   --   --  134*  --  135*  --  137   POTASSIUM  --  4.0  --   --   --  3.7  --  3.7  --  3.5  --  3.7   CHLORIDE  --   --   --   --   --   --   --  103  --  103  --  104   CO2  --   --   --   --   --   --   --  23  --  23  --  24   BUN  --   --   --   --   --   --   --  9.0  --  7.8*  --  9.3   CR  --  0.38*  --   --   --   --   --  0.39*  --  0.42*  --  0.41*   ANIONGAP  --   --   --   --   --   --   --  8  --  9  --  9   EDIN  --   --   --   --   --   --   --  8.4*  --  8.2*  --  8.0*   *  --  125* 162*   < >  --    < > 144*   < > 121*   < > 114*    < > = values in this interval not displayed.       Recent Results (from the past 24 hour(s))   XR Chest 2 Views    Narrative    EXAM: XR CHEST 2 VIEWS  LOCATION: [facility]  DATE: 7/21/2023    INDICATION: Follow-up left pleural effusion.  COMPARISON: 07/11/2023.      Impression    IMPRESSION: There is a small to moderate-sized left pleural effusion, increased in size from the previous examination. Additional nonspecific left retrocardiac opacity, which could reflect superimposed  atelectasis or consolidation.    Right lung is clear. No pneumothorax.    Cardiac size is poorly evaluated due silhouetting from the left pleural effusion. Atherosclerosis of the thoracic aorta.    Tracheostomy catheter has tip overlying the upper thoracic trachea.

## 2023-07-22 NOTE — PROGRESS NOTES
[facility]    Medicine Progress Note - Hospitalist Service  Date of Admission:  6/20/2023    Assessment & Plan   Lara Melendez is a 57 year old female with a history of COPD on prn home oxygen 2-3 L/nc, tobacco dependence, htn/hlp, PAF chronically on apixaban, anxiety, hypothyroidism, psoriasis admitted on 6/20/2023 with SOB and wheezing.     Patient has had a protracted admission here at [facility].  Her symptoms reportedly started a few days prior to admission and she called her pulmonologist who initiated empiric azithromycin and prednisone.  Symptoms continued to worsen so EMS was called and she was brought to the ER.  Her respiratory status continued to decline and she required intubation the day after admission.  She has been treated for community-acquired pneumonia along with COPD exacerbation with steroids and antibiotics.  While on the ventilator, patient had significant bronchospasm and high airway pressures requiring deep sedation and paralysis.  [facility] was actually consulted for possibility of ECMO but she was not deemed an appropriate candidate.  She was also found to have a large pneumothorax on 6/26 with chest tube placed by IR which has subsequently been removed (pulled on 7/10).  She has also had recurrent A-fib with RVR requiring both cardioversion and diltiazem with amiodarone.  She was continued on treatment with steroids, nebs, antibiotics and aggressive diuresis.  Paralytics were able to be weaned off but she did require tracheostomy for long-term ventilator weaning.     Patient had worsening abdominal distention on 7/11.  She had a CT scan done that was concerning for large bowel obstruction.  Colorectal surgery was consulted and she underwent surgical repair and colostomy in the early a.m. of 7/12.    Now medically stable and looking into disposition options.      She does continue to have fairly significant bilateral lower extremity weakness which I suspect is in part  related to severe deconditioning and critical illness myopathy.  Lumbar MRI on 7/21 negative for acute pathology or cord compromise.  Plan to manage conservatively for now unless neurology has other suggestions.    X-ray continues to show some left-sided pleural effusion, will reconsider possible thoracentesis in the next day or 2.     #Acute on chronic hypoxic and hypercapnic respiratory failure. COPD with acute exacerbation chronically on 2-3 L/nc prn. Left-sided ptx. PNA with sputum + for klebsiella oxytoca. Pulmonary edema 2/2 fluid resuscitation for sepsis status post tracheotomy  Complicated respiratory course-initially intubated due to progressive decline in respiratory function after admission-high airway pressures from severe bronchospasm required deep sedation including paralysis with vec.  She was also treated with 24 hours of continuous albuterol neb.  Lower respiratory track positive for Klebsiella as above along with rhinovirus.  -Patient completed steroid course.  She also completed course of IV antibiotics.  -Chest tube placed by IR on 6/26 for pneumothorax, subsequently removed on 7/10.  -Continue on DuoNebs.  Appreciate pulmonary consultation.  -IV Lasix given prn for volume overload.   -Will need placement for continued recovery. Now looking at acute rehab. Needs to recover from surgical perspective first.  -Speech is working with the patient.  Trach down-sized on 7/15 with better comfort and able to talk more.    --Discussed decannulation given she has been on room air.  She is still having some secretions per respiratory therapy.  Monitor in the coming 1 to 2 days to determine readiness for decannulation.  If cannot decannulate here then will need to follow-up with Dr. Mackey in clinic for de-cannulation. I'm hopeful we can de-cannulate prior to discharge.     #Ileus. Large bowel obstruction s/p diverting colostomy: First tried bowel meds without improvement.  Patient had worsening  abdominal distention and pain.  CT abdomen pelvis done on 7/12 showed colonic dilation with abrupt termination at the mid sigmoid colon with suggestion of pneumatosis.  Colorectal surgery was consulted.  Underwent operative repair early a.m. of 7/12.  Seem to tolerate well.  Appreciate colorectal surgery for postoperative cares.    -Starting on tube feeds again on 7/14.  -We have been holding some nonessential medications per surgery team recommendations initially.  Reinitiation of tube feeds, these meds have now been resumed.  Tolerating feeds.     #Paroxysmal AF/SVT: Pta on diltiazem and apixaban (BB being avoided due to severe COPD). Required DCCV x 2 during this hospitalization.    -apixiban  -amiodarone     #Sepsis. Elevated lactate: Her hospital course has been peppered with episodic sepsis with leukocytosis/tachycardia/fever/respiratory failure etc  -resolved  -elevated lactate due to sepsis, and nebs      #Hypotension: Did require transient phenylephrine support due to deep sedation. Now resolved.      #CAUTI.  Pseudomonas UTI: Hines cath placed for critical illness, UA 6/28 with inflammatory cells with large blood. UCx with  K with pseudomonas  -rec'd 2 days of liberty followed by 4 days of cefepime, abx completed 7/6  -Hines removed     #Hyperkalemia. Hypokalemia: Potassium bola to 5.5 and persisted in that range for a day or 2.  She received several doses of Lokelma and potassium level has now normalized and actually downtrending.  Unclear etiology.  Renal function is normal.  Her PTA losartan has been on hold.  Replacement as needed.  Monitor.     #Hypernatremia: Significant rise in sodium up to 160 with tube feed initiation.  Improved with high-dose free water via NG and D5, but remained at about 150.  Sodium is now normalized after dose of metolazone and Lasix drip.  Monitoring     #Hypertension pta on dilt and losartan. dilt back on and being titrated. Losartan discontinued in the setting of  hyperkalemia     #Anemia  Drifted down to as low as 6.8-otherwise hemodynamically stable. No clear bleeding noted.  Suspect critical illness/venipuncture mediated  -transfused single unit PRBC 7/8     DVT Prophylaxis: Resumed DOAC on 7/16.   Code Status: Full Code  Lines: Remove PICC line on 7/18.  Hines removed.  Peripheral IVs.        Diet: Snacks/Supplements Adult: Magic Cup; Between Meals  Combination Diet Regular Diet  Snacks/Supplements Adult: Ensure Enlive; With Meals    DVT Prophylaxis: DOAC  Hines Catheter: Not present  Lines: None     Cardiac Monitoring: None  Code Status: Full Code      Clinically Significant Risk Factors              # Hypoalbuminemia: Lowest albumin = 2.8 g/dL at 7/12/2023  4:43 AM, will monitor as appropriate     # Hypertension: Noted on problem list                 Dispo: Likely TCU in the next few days.  Need to clarify plan for trach decannulation.    Aftab Malave MD  Hospitalist Service  [facility]  Securely message with [employer] (more info)  Text page via [employer] Paging/Directory   ______________________________________________________________________    Interval History   Patient seen examined at bedside/chart reviewed.   Reviewed MRI results, no cord compromise or major issue  Chest x-ray showing a mild to moderate left-sided effusion, discussed possibility of thoracentesis in the next day or 2  Leg still quite weak, otherwise she denies complaints and is eager to pursue rehab    Physical Exam   Vital Signs: Temp: 98.6  F (37  C) Temp src: Oral BP: 126/63 Pulse: 91   Resp: 20 SpO2: 93 % O2 Device: None (Room air)    Weight: 153 lbs 14.1 oz NC Katiw    General:  Alert, calm, NAD  CV: regular rate and rhythm, no murmurs or rubs  Lungs:  Clear to ascultation bilaterally, normal respiratory effort  HEENT:  Trach in place  Abdomen:  Soft, nontender, nondistended, no masses, normal bowel sounds, colostomy in place with stool in bag  Extremities:  No  edema  Neuro: normal strength and sensation in all 4 extremities, cranial nerves grossly intact  Psychiatric:  Mood and affect within normal limits       Medical Decision Making       40 MINUTES SPENT BY ME on the date of service doing chart review, history, exam, documentation & further activities per the note.      Data   NOTE: Data reviewed over the past 24 hrs contributes toward MDM complexity

## 2023-07-23 ENCOUNTER — APPOINTMENT (OUTPATIENT)
Dept: OCCUPATIONAL THERAPY | Facility: CLINIC | Age: 58
End: 2023-07-23
Payer: COMMERCIAL

## 2023-07-23 ENCOUNTER — APPOINTMENT (OUTPATIENT)
Dept: PHYSICAL THERAPY | Facility: CLINIC | Age: 58
End: 2023-07-23
Payer: COMMERCIAL

## 2023-07-23 LAB
GLUCOSE BLDC GLUCOMTR-MCNC: 100 MG/DL (ref 70–99)
GLUCOSE BLDC GLUCOMTR-MCNC: 101 MG/DL (ref 70–99)
GLUCOSE BLDC GLUCOMTR-MCNC: 98 MG/DL (ref 70–99)
HOLD SPECIMEN: NORMAL
LDH SERPL L TO P-CCNC: 229 U/L (ref 0–250)
POTASSIUM SERPL-SCNC: 3.6 MMOL/L (ref 3.4–5.3)
PROT SERPL-MCNC: 6.9 G/DL (ref 6.4–8.3)

## 2023-07-23 PROCEDURE — 36415 COLL VENOUS BLD VENIPUNCTURE: CPT | Performed by: INTERNAL MEDICINE

## 2023-07-23 PROCEDURE — 97110 THERAPEUTIC EXERCISES: CPT | Mod: GO

## 2023-07-23 PROCEDURE — 250N000013 HC RX MED GY IP 250 OP 250 PS 637: Performed by: INTERNAL MEDICINE

## 2023-07-23 PROCEDURE — 94640 AIRWAY INHALATION TREATMENT: CPT | Mod: 76

## 2023-07-23 PROCEDURE — 250N000009 HC RX 250: Performed by: INTERNAL MEDICINE

## 2023-07-23 PROCEDURE — 97110 THERAPEUTIC EXERCISES: CPT | Mod: GP

## 2023-07-23 PROCEDURE — 99233 SBSQ HOSP IP/OBS HIGH 50: CPT | Performed by: INTERNAL MEDICINE

## 2023-07-23 PROCEDURE — 83615 LACTATE (LD) (LDH) ENZYME: CPT | Performed by: INTERNAL MEDICINE

## 2023-07-23 PROCEDURE — 120N000001 HC R&B MED SURG/OB

## 2023-07-23 PROCEDURE — 84132 ASSAY OF SERUM POTASSIUM: CPT | Performed by: INTERNAL MEDICINE

## 2023-07-23 PROCEDURE — 999N000157 HC STATISTIC RCP TIME EA 10 MIN

## 2023-07-23 PROCEDURE — 97530 THERAPEUTIC ACTIVITIES: CPT | Mod: GP

## 2023-07-23 PROCEDURE — 94640 AIRWAY INHALATION TREATMENT: CPT

## 2023-07-23 PROCEDURE — 84155 ASSAY OF PROTEIN SERUM: CPT | Performed by: INTERNAL MEDICINE

## 2023-07-23 PROCEDURE — 250N000013 HC RX MED GY IP 250 OP 250 PS 637: Performed by: HOSPITALIST

## 2023-07-23 RX ORDER — DILTIAZEM HYDROCHLORIDE 120 MG/1
240 CAPSULE, COATED, EXTENDED RELEASE ORAL DAILY
Status: DISCONTINUED | OUTPATIENT
Start: 2023-07-24 | End: 2023-07-28 | Stop reason: HOSPADM

## 2023-07-23 RX ORDER — DILTIAZEM HYDROCHLORIDE 120 MG/1
120 CAPSULE, COATED, EXTENDED RELEASE ORAL DAILY
Status: DISCONTINUED | OUTPATIENT
Start: 2023-07-24 | End: 2023-07-23

## 2023-07-23 RX ORDER — POTASSIUM CHLORIDE 1.5 G/1.58G
20 POWDER, FOR SOLUTION ORAL ONCE
Status: COMPLETED | OUTPATIENT
Start: 2023-07-23 | End: 2023-07-23

## 2023-07-23 RX ADMIN — LEVALBUTEROL HYDROCHLORIDE 0.63 MG: 0.63 SOLUTION RESPIRATORY (INHALATION) at 07:20

## 2023-07-23 RX ADMIN — DILTIAZEM HYDROCHLORIDE 60 MG: 30 TABLET, FILM COATED ORAL at 06:27

## 2023-07-23 RX ADMIN — PAROXETINE HYDROCHLORIDE 20 MG: 20 TABLET, FILM COATED ORAL at 09:43

## 2023-07-23 RX ADMIN — LEVALBUTEROL HYDROCHLORIDE 0.63 MG: 0.63 SOLUTION RESPIRATORY (INHALATION) at 15:57

## 2023-07-23 RX ADMIN — IPRATROPIUM BROMIDE 0.5 MG: 0.5 SOLUTION RESPIRATORY (INHALATION) at 15:57

## 2023-07-23 RX ADMIN — POTASSIUM CHLORIDE FOR ORAL SOLUTION 20 MEQ: 1.5 POWDER, FOR SOLUTION ORAL at 09:45

## 2023-07-23 RX ADMIN — DILTIAZEM HYDROCHLORIDE 60 MG: 30 TABLET, FILM COATED ORAL at 18:19

## 2023-07-23 RX ADMIN — ACETAMINOPHEN 650 MG: 325 TABLET, FILM COATED ORAL at 16:35

## 2023-07-23 RX ADMIN — IPRATROPIUM BROMIDE 0.5 MG: 0.5 SOLUTION RESPIRATORY (INHALATION) at 11:56

## 2023-07-23 RX ADMIN — APIXABAN 5 MG: 5 TABLET, FILM COATED ORAL at 09:42

## 2023-07-23 RX ADMIN — LEVALBUTEROL HYDROCHLORIDE 0.63 MG: 0.63 SOLUTION RESPIRATORY (INHALATION) at 20:46

## 2023-07-23 RX ADMIN — NICOTINE 1 PATCH: 14 PATCH, EXTENDED RELEASE TRANSDERMAL at 09:46

## 2023-07-23 RX ADMIN — BUDESONIDE 0.5 MG: 0.5 INHALANT ORAL at 07:21

## 2023-07-23 RX ADMIN — MAGNESIUM OXIDE TAB 400 MG (241.3 MG ELEMENTAL MG) 400 MG: 400 (241.3 MG) TAB at 09:42

## 2023-07-23 RX ADMIN — ACETAMINOPHEN 650 MG: 325 TABLET, FILM COATED ORAL at 23:42

## 2023-07-23 RX ADMIN — BUDESONIDE 0.5 MG: 0.5 INHALANT ORAL at 20:46

## 2023-07-23 RX ADMIN — ATORVASTATIN CALCIUM 20 MG: 20 TABLET, FILM COATED ORAL at 09:43

## 2023-07-23 RX ADMIN — AMIODARONE HYDROCHLORIDE 200 MG: 200 TABLET ORAL at 09:42

## 2023-07-23 RX ADMIN — DILTIAZEM HYDROCHLORIDE 60 MG: 30 TABLET, FILM COATED ORAL at 23:42

## 2023-07-23 RX ADMIN — CLONAZEPAM 0.5 MG: 0.5 TABLET ORAL at 23:48

## 2023-07-23 RX ADMIN — LEVOTHYROXINE SODIUM 112 MCG: 0.11 TABLET ORAL at 06:29

## 2023-07-23 RX ADMIN — IPRATROPIUM BROMIDE 0.5 MG: 0.5 SOLUTION RESPIRATORY (INHALATION) at 20:45

## 2023-07-23 RX ADMIN — ACETAMINOPHEN 650 MG: 325 TABLET, FILM COATED ORAL at 06:32

## 2023-07-23 RX ADMIN — IPRATROPIUM BROMIDE 0.5 MG: 0.5 SOLUTION RESPIRATORY (INHALATION) at 07:21

## 2023-07-23 RX ADMIN — APIXABAN 5 MG: 5 TABLET, FILM COATED ORAL at 20:39

## 2023-07-23 RX ADMIN — LEVALBUTEROL HYDROCHLORIDE 0.63 MG: 0.63 SOLUTION RESPIRATORY (INHALATION) at 11:56

## 2023-07-23 RX ADMIN — DILTIAZEM HYDROCHLORIDE 60 MG: 30 TABLET, FILM COATED ORAL at 12:35

## 2023-07-23 ASSESSMENT — ACTIVITIES OF DAILY LIVING (ADL)
ADLS_ACUITY_SCORE: 27
ADLS_ACUITY_SCORE: 30
ADLS_ACUITY_SCORE: 30
ADLS_ACUITY_SCORE: 27
ADLS_ACUITY_SCORE: 30

## 2023-07-23 NOTE — PROGRESS NOTES
RT NOTE:    Patient remains capped on room air. Trach care done x1 and site cleaned. Neb treatment given as ordered and pt denies SOB and no respiratory distresses noted. Trach supplies and spare trach at bedside. Pt declined need of suction. Pt is tolerating it well. Will continue to monitor and assess the pt's current respiratory status and needs.      Ulices Escalante, RT

## 2023-07-23 NOTE — PLAN OF CARE
Goal Outcome Evaluation:       End of Shift Summary  For vital signs and complete assessments, please see documentation flowsheets.     Pertinent assessments:  Pt A&OX4. Vss and on RA. Ls dim and denies sob. Had a fair appetite. Had pain to the back and tylenol given x1 with releif and has the heating pad. Ostomy in place with minimal output. Used sera steady to the BSC. Trach in place.     Major Shift Events :None    Treatment Plan: Pain management, nebs, BG checks, K protocol, monitor symptoms.

## 2023-07-23 NOTE — PLAN OF CARE
Goal Outcome Evaluation:      Plan of Care Reviewed With: patient      End of Shift Summary  For vital signs and complete assessments, please see documentation flowsheets.     Pertinent assessments: Pt A&O, on RA, Ls exp wheezes, RT is doing nebs, trach in place, CDI. Denies pain & nausea, VSS, on regular diet, Ostomy in place, passing flatus, BM 2x,  colostomy change due today/pt refused said will do later today. . Up to BSC x 2, voiding without difficulty. Nicotine patch in place, OT worked with her today.       Major Shift Events: Thoracentesis schedule 7/24/23  Treatment Plan: Pain management, nebs,  monitor symptoms

## 2023-07-23 NOTE — PROGRESS NOTES
[facility]    Medicine Progress Note - Hospitalist Service  Date of Admission:  6/20/2023    Assessment & Plan   Lara Melendez is a 57 year old female with a history of COPD on prn home oxygen 2-3 L/nc, tobacco dependence, htn/hlp, PAF chronically on apixaban, anxiety, hypothyroidism, psoriasis admitted on 6/20/2023 with SOB and wheezing.     Patient has had a protracted admission here at [facility].  Her symptoms reportedly started a few days prior to admission and she called her pulmonologist who initiated empiric azithromycin and prednisone.  Symptoms continued to worsen so EMS was called and she was brought to the ER.  Her respiratory status continued to decline and she required intubation the day after admission.  She has been treated for community-acquired pneumonia along with COPD exacerbation with steroids and antibiotics.  While on the ventilator, patient had significant bronchospasm and high airway pressures requiring deep sedation and paralysis.  [facility] was actually consulted for possibility of ECMO but she was not deemed an appropriate candidate.  She was also found to have a large pneumothorax on 6/26 with chest tube placed by IR which has subsequently been removed (pulled on 7/10).  She has also had recurrent A-fib with RVR requiring both cardioversion and diltiazem with amiodarone.  She was continued on treatment with steroids, nebs, antibiotics and aggressive diuresis.  Paralytics were able to be weaned off but she did require tracheostomy for long-term ventilator weaning.     Patient had worsening abdominal distention on 7/11.  She had a CT scan done that was concerning for large bowel obstruction.  Colorectal surgery was consulted and she underwent surgical repair and colostomy in the early a.m. of 7/12.    Now medically stable and looking into disposition options.      She does continue to have fairly significant bilateral lower extremity weakness which I suspect is in part  related to severe deconditioning and critical illness myopathy.  Lumbar MRI on 7/21 negative for acute pathology or cord compromise.  Plan to manage conservatively for now unless neurology has other suggestions.    X-ray continues to show some left-sided pleural effusion which is symptomatic for her, I have ordered a thoracentesis for Monday, 7/24.    Finally, plan to reconsult pulmonology on 7/24.  She continues to have a tracheostomy in place but I suspect that this could be decannulated.  The fact that she has a tracheostomy apparently is complicating her disposition significantly.     #Acute on chronic hypoxic and hypercapnic respiratory failure. COPD with acute exacerbation chronically on 2-3 L/nc prn. Left-sided ptx. PNA with sputum + for klebsiella oxytoca. Pulmonary edema 2/2 fluid resuscitation for sepsis status post tracheotomy  Complicated respiratory course-initially intubated due to progressive decline in respiratory function after admission-high airway pressures from severe bronchospasm required deep sedation including paralysis with vec.  She was also treated with 24 hours of continuous albuterol neb.  Lower respiratory track positive for Klebsiella as above along with rhinovirus.  -Patient completed steroid course.  She also completed course of IV antibiotics.  -Chest tube placed by IR on 6/26 for pneumothorax, subsequently removed on 7/10.  -Continue on DuoNebs.  Appreciate pulmonary consultation.  -IV Lasix given prn for volume overload.   -Will need placement for continued recovery.   -Speech is working with the patient.  Trach down-sized on 7/15 with better comfort and able to talk more.    --Discussed decannulation given she has been on room air for quite some time.  Does not seem to be having many secretions and the fact that she has a tracheostomy is complicating disposition/rehab placement.     #Ileus. Large bowel obstruction s/p diverting colostomy: Resolved.  First tried bowel meds without  improvement.  Patient had worsening abdominal distention and pain.  CT abdomen pelvis done on 7/12 showed colonic dilation with abrupt termination at the mid sigmoid colon with suggestion of pneumatosis.  Colorectal surgery was consulted.  Underwent operative repair early a.m. of 7/12.  Seem to tolerate well.  Appreciate colorectal surgery for postoperative cares.    -Starting on tube feeds again on 7/14, now off for multiple days.     #Paroxysmal AF/SVT: Pta on diltiazem and apixaban (BB being avoided due to severe COPD). Required DCCV x 2 during this hospitalization.    -apixiban  -amiodarone started during this hospitalization.  We will trial off amiodarone now that she is so much more stable.  Restarted her home diltiazem.       #Sepsis. Elevated lactate: Her hospital course has been peppered with episodic sepsis with leukocytosis/tachycardia/fever/respiratory failure etc  -resolved  -elevated lactate due to sepsis, and nebs      #Hypotension: Did require transient phenylephrine support due to deep sedation. Now resolved.      #CAUTI.  Pseudomonas UTI: Hines cath placed for critical illness, UA 6/28 with inflammatory cells with large blood. UCx with  K with pseudomonas  -rec'd 2 days of liberty followed by 4 days of cefepime, abx completed 7/6  -Hines removed     #Hyperkalemia. Hypokalemia: Resolved.  PTA losartan has been on hold.  Blood pressure normal.     #Hypernatremia: Resolved.  Significant rise in sodium up to 160 with tube feed initiation.  Improved with adjustment of water flushes.     #Hypertension pta on dilt and losartan. dilt back on and being titrated. Losartan discontinued in the setting of hyperkalemia     #Anemia  Drifted down to as low as 6.8-otherwise hemodynamically stable. No clear bleeding noted.  Suspect critical illness/venipuncture mediated  -transfused single unit PRBC 7/8     DVT Prophylaxis: Resumed DOAC on 7/16.   Code Status: Full Code  Lines: Removed PICC line on 7/18.  Hines  removed.  Peripheral IVs.        Diet: Snacks/Supplements Adult: Magic Cup; Between Meals  Combination Diet Regular Diet  Snacks/Supplements Adult: Ensure Enlive; With Meals    DVT Prophylaxis: DOAC  Hines Catheter: Not present  Lines: None     Cardiac Monitoring: None  Code Status: Full Code      Clinically Significant Risk Factors              # Hypoalbuminemia: Lowest albumin = 2.8 g/dL at 7/12/2023  4:43 AM, will monitor as appropriate     # Hypertension: Noted on problem list                 Dispo: Likely TCU in the next few days ending placement.  Would benefit from trach decannulation if at all possible in the hospital.    Aftab Malave MD  Hospitalist Service  [facility]  Securely message with [employer] (more info)  Text page via [employer] Paging/Directory   ______________________________________________________________________    Interval History   Continues to do well, remains on room air and seems to have minimal secretions  Pleasant and brighter affect  Discussed revisiting trach decannulation, will ask pulmonology to follow-up with her tomorrow since she is doing so well  Discussed with care coordinator, she is being declined placement at numerous TCU's due to tracheostomy  Updated her daughter at the bedside  Still has a sense of left chest fullness and some mild dyspnea which seems related to her left-sided lateral effusion, pursuing thoracentesis tomorrow      Physical Exam   Vital Signs: Temp: 98.6  F (37  C) Temp src: Oral BP: 121/70 Pulse: 82   Resp: 18 SpO2: 95 % O2 Device: None (Room air)    Weight: 132 lbs 4.42 oz NC Katiw    General:  Alert, calm, NAD  CV: regular rate and rhythm, no murmurs or rubs  Lungs:  Clear to ascultation bilaterally, normal respiratory effort diminished at the bases, particularly the left.  HEENT:  Trach in place  Abdomen:  Soft, nontender, nondistended, no masses, normal bowel sounds, colostomy in place with stool in bag  Extremities:  No  edema  Neuro: normal strength and sensation in all 4 extremities, cranial nerves grossly intact  Psychiatric:  Mood and affect within normal limits       Medical Decision Making         Data   NOTE: Data reviewed over the past 24 hrs contributes toward MDM complexity

## 2023-07-23 NOTE — PROGRESS NOTES
End of Shift Summary  For vital signs and complete assessments, please see documentation flowsheets.     Pertinent assessments: Pt A&O, on RA, trach in place, CDI. Tylenol x 1 this AM for c/o back pain. Ostomy in place, passing flatus, small amount of output. . Up to BSC x 3, voiding without difficulty. Nicotine patch in place     Major Shift Events: uneventful     Treatment Plan: Pain management, nebs, BG checks, K protocol, monitor symptoms

## 2023-07-23 NOTE — PROGRESS NOTES
Care Management Follow Up    Length of Stay (days): 32    Expected Discharge Date: 07/26/2023     Concerns to be Addressed: discharge planning      Patient plan of care discussed at interdisciplinary rounds: Yes    Anticipated Discharge Disposition: Transitional Care, Acute Rehab       Additional Information:  CM continues to follow for discharge planning. Plan will be for patient to go to Transitional Care Unit vs Acute Rehab Unit when determination has been made about her trach decannulation. Per chart review, Pulm will be reconsulted on 7/24 to assess if decannulation is possible during her hospital stay. Patients trach may be a barrier to discharge planning. Call placed to Acute Rehab Unit Liaison to discuss discharge plan. They have patient on their referral list and will cont to assess for appropriateness for admission given her trach and possible difficult Transitional Care Unit placement. Research done with possible Transitional Care Unit's able to take trach patients being [facility], [facility], [facility] and [facility] and [facility]. Will cont to follow after Pulm consult and speak with patient on preference.               Chyna Christian RN BSN CM  Inpatient Care Coordination  [facility]  [phone]

## 2023-07-24 ENCOUNTER — APPOINTMENT (OUTPATIENT)
Dept: PHYSICAL THERAPY | Facility: CLINIC | Age: 58
End: 2023-07-24
Payer: COMMERCIAL

## 2023-07-24 ENCOUNTER — APPOINTMENT (OUTPATIENT)
Dept: ULTRASOUND IMAGING | Facility: CLINIC | Age: 58
End: 2023-07-24
Attending: INTERNAL MEDICINE
Payer: COMMERCIAL

## 2023-07-24 ENCOUNTER — APPOINTMENT (OUTPATIENT)
Dept: OCCUPATIONAL THERAPY | Facility: CLINIC | Age: 58
End: 2023-07-24
Payer: COMMERCIAL

## 2023-07-24 LAB
% LINING CELLS, BODY FLUID: 1 %
APPEARANCE FLD: ABNORMAL
CELL COUNT BODY FLUID SOURCE: ABNORMAL
COLOR FLD: YELLOW
CREAT SERPL-MCNC: 0.5 MG/DL (ref 0.51–0.95)
EOSINOPHIL NFR FLD MANUAL: 2 %
GFR SERPL CREATININE-BSD FRML MDRD: >90 ML/MIN/1.73M2
GLUCOSE BLDC GLUCOMTR-MCNC: 122 MG/DL (ref 70–99)
GLUCOSE BODY FLUID SOURCE: NORMAL
GLUCOSE FLD-MCNC: 119 MG/DL
LD BODY BODY FLUID SOURCE: NORMAL
LDH FLD L TO P-CCNC: 200 U/L
LYMPHOCYTES NFR FLD MANUAL: 35 %
MONOS+MACROS NFR FLD MANUAL: 38 %
NEUTS BAND NFR FLD MANUAL: 24 %
PLATELET # BLD AUTO: 250 10E3/UL (ref 150–450)
POTASSIUM SERPL-SCNC: 3.6 MMOL/L (ref 3.4–5.3)
PROT FLD-MCNC: 2.9 G/DL
PROTEIN BODY FLUID SOURCE: NORMAL
WBC # FLD AUTO: 2171 /UL

## 2023-07-24 PROCEDURE — 36415 COLL VENOUS BLD VENIPUNCTURE: CPT | Performed by: HOSPITALIST

## 2023-07-24 PROCEDURE — 999N000197 HC STATISTIC WOC PT EDUCATION, 0-15 MIN

## 2023-07-24 PROCEDURE — 89050 BODY FLUID CELL COUNT: CPT | Performed by: INTERNAL MEDICINE

## 2023-07-24 PROCEDURE — 82565 ASSAY OF CREATININE: CPT | Performed by: HOSPITALIST

## 2023-07-24 PROCEDURE — 82945 GLUCOSE OTHER FLUID: CPT | Performed by: INTERNAL MEDICINE

## 2023-07-24 PROCEDURE — 120N000001 HC R&B MED SURG/OB

## 2023-07-24 PROCEDURE — 87070 CULTURE OTHR SPECIMN AEROBIC: CPT | Performed by: INTERNAL MEDICINE

## 2023-07-24 PROCEDURE — 250N000009 HC RX 250: Performed by: RADIOLOGY

## 2023-07-24 PROCEDURE — 999N000157 HC STATISTIC RCP TIME EA 10 MIN

## 2023-07-24 PROCEDURE — 250N000013 HC RX MED GY IP 250 OP 250 PS 637: Performed by: INTERNAL MEDICINE

## 2023-07-24 PROCEDURE — 97530 THERAPEUTIC ACTIVITIES: CPT | Mod: GP | Performed by: PHYSICAL THERAPIST

## 2023-07-24 PROCEDURE — 250N000013 HC RX MED GY IP 250 OP 250 PS 637: Performed by: HOSPITALIST

## 2023-07-24 PROCEDURE — 99232 SBSQ HOSP IP/OBS MODERATE 35: CPT | Performed by: INTERNAL MEDICINE

## 2023-07-24 PROCEDURE — 97110 THERAPEUTIC EXERCISES: CPT | Mod: GO

## 2023-07-24 PROCEDURE — 272N000706 US THORACENTESIS

## 2023-07-24 PROCEDURE — 87205 SMEAR GRAM STAIN: CPT | Performed by: INTERNAL MEDICINE

## 2023-07-24 PROCEDURE — 85049 AUTOMATED PLATELET COUNT: CPT | Performed by: HOSPITALIST

## 2023-07-24 PROCEDURE — 250N000009 HC RX 250: Performed by: INTERNAL MEDICINE

## 2023-07-24 PROCEDURE — 84132 ASSAY OF SERUM POTASSIUM: CPT | Performed by: INTERNAL MEDICINE

## 2023-07-24 PROCEDURE — 32555 ASPIRATE PLEURA W/ IMAGING: CPT

## 2023-07-24 PROCEDURE — 84157 ASSAY OF PROTEIN OTHER: CPT | Performed by: INTERNAL MEDICINE

## 2023-07-24 PROCEDURE — 94640 AIRWAY INHALATION TREATMENT: CPT

## 2023-07-24 PROCEDURE — 94640 AIRWAY INHALATION TREATMENT: CPT | Mod: 76

## 2023-07-24 PROCEDURE — 0W9B3ZZ DRAINAGE OF LEFT PLEURAL CAVITY, PERCUTANEOUS APPROACH: ICD-10-PCS | Performed by: RADIOLOGY

## 2023-07-24 PROCEDURE — 83615 LACTATE (LD) (LDH) ENZYME: CPT | Performed by: INTERNAL MEDICINE

## 2023-07-24 PROCEDURE — 89051 BODY FLUID CELL COUNT: CPT | Performed by: INTERNAL MEDICINE

## 2023-07-24 PROCEDURE — 97110 THERAPEUTIC EXERCISES: CPT | Mod: GP | Performed by: PHYSICAL THERAPIST

## 2023-07-24 RX ADMIN — ACETAMINOPHEN 650 MG: 325 TABLET, FILM COATED ORAL at 20:12

## 2023-07-24 RX ADMIN — ACETAMINOPHEN 650 MG: 325 TABLET, FILM COATED ORAL at 14:46

## 2023-07-24 RX ADMIN — APIXABAN 5 MG: 5 TABLET, FILM COATED ORAL at 08:29

## 2023-07-24 RX ADMIN — CLONAZEPAM 0.5 MG: 0.5 TABLET ORAL at 12:03

## 2023-07-24 RX ADMIN — BUDESONIDE 0.5 MG: 0.5 INHALANT ORAL at 08:15

## 2023-07-24 RX ADMIN — MAGNESIUM OXIDE TAB 400 MG (241.3 MG ELEMENTAL MG) 400 MG: 400 (241.3 MG) TAB at 08:28

## 2023-07-24 RX ADMIN — BUDESONIDE 0.5 MG: 0.5 INHALANT ORAL at 21:01

## 2023-07-24 RX ADMIN — LEVALBUTEROL HYDROCHLORIDE 0.63 MG: 0.63 SOLUTION RESPIRATORY (INHALATION) at 08:15

## 2023-07-24 RX ADMIN — IPRATROPIUM BROMIDE 0.5 MG: 0.5 SOLUTION RESPIRATORY (INHALATION) at 21:01

## 2023-07-24 RX ADMIN — LEVOTHYROXINE SODIUM 112 MCG: 0.11 TABLET ORAL at 06:32

## 2023-07-24 RX ADMIN — DILTIAZEM HYDROCHLORIDE 240 MG: 120 CAPSULE, COATED, EXTENDED RELEASE ORAL at 08:29

## 2023-07-24 RX ADMIN — LIDOCAINE HYDROCHLORIDE 10 ML: 10 INJECTION, SOLUTION EPIDURAL; INFILTRATION; INTRACAUDAL; PERINEURAL at 14:19

## 2023-07-24 RX ADMIN — NICOTINE 1 PATCH: 14 PATCH, EXTENDED RELEASE TRANSDERMAL at 08:31

## 2023-07-24 RX ADMIN — IPRATROPIUM BROMIDE 0.5 MG: 0.5 SOLUTION RESPIRATORY (INHALATION) at 12:13

## 2023-07-24 RX ADMIN — ATORVASTATIN CALCIUM 20 MG: 20 TABLET, FILM COATED ORAL at 08:29

## 2023-07-24 RX ADMIN — APIXABAN 5 MG: 5 TABLET, FILM COATED ORAL at 20:12

## 2023-07-24 RX ADMIN — LEVALBUTEROL HYDROCHLORIDE 0.63 MG: 0.63 SOLUTION RESPIRATORY (INHALATION) at 12:13

## 2023-07-24 RX ADMIN — LEVALBUTEROL HYDROCHLORIDE 0.63 MG: 0.63 SOLUTION RESPIRATORY (INHALATION) at 21:01

## 2023-07-24 RX ADMIN — IPRATROPIUM BROMIDE 0.5 MG: 0.5 SOLUTION RESPIRATORY (INHALATION) at 16:09

## 2023-07-24 RX ADMIN — PAROXETINE HYDROCHLORIDE 20 MG: 20 TABLET, FILM COATED ORAL at 08:29

## 2023-07-24 RX ADMIN — LEVALBUTEROL HYDROCHLORIDE 0.63 MG: 0.63 SOLUTION RESPIRATORY (INHALATION) at 16:09

## 2023-07-24 RX ADMIN — IPRATROPIUM BROMIDE 0.5 MG: 0.5 SOLUTION RESPIRATORY (INHALATION) at 08:15

## 2023-07-24 ASSESSMENT — ENCOUNTER SYMPTOMS
WEAKNESS: 1
HEMOPTYSIS: 0
FOCAL WEAKNESS: 0
ORTHOPNEA: 0
SPUTUM PRODUCTION: 0
SHORTNESS OF BREATH: 1
SPEECH CHANGE: 0
FEVER: 0
COUGH: 0
SORE THROAT: 0
WHEEZING: 0
VOMITING: 0

## 2023-07-24 ASSESSMENT — ACTIVITIES OF DAILY LIVING (ADL)
ADLS_ACUITY_SCORE: 26
ADLS_ACUITY_SCORE: 30
ADLS_ACUITY_SCORE: 25
ADLS_ACUITY_SCORE: 26
ADLS_ACUITY_SCORE: 26
ADLS_ACUITY_SCORE: 30
ADLS_ACUITY_SCORE: 26
ADLS_ACUITY_SCORE: 25
ADLS_ACUITY_SCORE: 26
ADLS_ACUITY_SCORE: 30

## 2023-07-24 NOTE — CONSULTS
Pulmonary Consult  Lara Melendez MRN: [id number]  1965  Date of Admission:6/20/2023  Primary care provider: Sudhir Carroll  ___________________________________    Lara Melendez MRN# [id number]   YOB: 1965 Age: 57 year old   Date of Admission: 6/20/2023     Reason for consult: I was asked by the hospital medicine team for eval and management of  potential Tracheostomy decannulation and left sided pleural effusion.       Assessment and Recommendations:       #Trach  S/p trach for prolonged MV on 7/7, now eval'd for decannulation.  Pt doing very well on speech evals, swallowing and phonating very well. Doing so since last week. Has been downsized already to 4.0.    Removed trach today at beside. Placed regular gauze over stoma.  -Continue to place gauze over stoma  (can secure lightly with tape); change prn (every few days)  -Pt can hold hand over stoma to help her talk   -Stoma will close on its own    #Left pleural effusion   Seems small on US based off the radiology images. CXR from 7/21 seems as if atelectasis is present as well.   Labs showed +for light's criteria based off elevated LDH in pleural effusion; serum protein and LDH from yesterday though.    However, do not think this is an infectious  process.   -Follow up cell diff, cytology  from pleural effuson  -Would repeat chest imaging post-procedure both to rule out pneumo and to get updated view of pleural effusion/left lung; consider CT Chest wo contrast to assess the pleural space; would at least get follow CXR after procedure (ordered CXR)    Quirino Fajardo MD  [facility],  of Medicine  Pulmonary/Critical Care Medicine  July 24, 2023      Pulmonary will continue to follow. We are in house at [facility] on Monday, Wednesday, and Friday. For assistance on other days, please page the on-call pulmonologist through [facility] or the .             HPI:      Lara Melendez is a 57 year old female with a history of COPD on prn home oxygen 2-3 L/nc, tobacco dependence, htn/hlp, PAF chronically on apixaban, anxiety, hypothyroidism, psoriasis admitted on 6/20/2023 with SOB and wheezing who during this protract hospitalization developed ARDS with difficult to wean s/p trach/PEG 7/7, spontaneous left sided pneumothorax s/p chest tube (now removed), large bowel obstruction s/p colostomy, UTI.  Patient has been downsized to 4.0 trach already for some time.  She has had a speaking valve on on a consistent basis since last week.  She is vocalizing clearly, denies sputum production or difficulty with coughing, has worked well with speech in terms of swallowing again.  Regarding the left-sided pleural effusion, CXR 7/21 showed worsening left pleural effusion vs 7/11. Radiology performed left sided thoracentesis today, only 100cc removed after pocket on radiology was only about ~2cm. CT ab/pelvis 7/11 showed small pleural effusion with atelectasis.            Past Medical History:     Past Medical History:   Diagnosis Date     Anxiety      COPD (chronic obstructive pulmonary disease) - 2L home O2      Diverticulitis of colon      Hypercholesterolemia      Hypertension      Hypothyroidism      Infection due to 2019 novel coronavirus 5/14/2022     Paroxysmal atrial fibrillation      Psoriasis      Pulmonary nodule - left upper lobe               Past Surgical History:      Past Surgical History:   Procedure Laterality Date     CHOLECYSTECTOMY       COLOSTOMY N/A 7/12/2023    Procedure: OPENING DIVERTING COLOSTOMY;  Surgeon: Jaspal Rashid MD;  Location: [facility] OR     INCISION AND DRAINAGE MANDIBLE, COMBINED Left 01/10/2022    Procedure: INCISION AND DRAINAGE, MANDIBLE;  Surgeon: Jn Feliz DDS;  Location: [facility] OR     INCISION AND DRAINAGE MANDIBLE, COMBINED Left 02/04/2022    Procedure: INCISION AND DRAINAGE, MANDIBLE;  Surgeon: Taylor Ortez DDS;  Location: [facility] OR     TOOTH  EXTRACTION       Vocal Cord surgery                Social History:     Social History     Socioeconomic History     Marital status:      Spouse name: Not on file     Number of children: Not on file     Years of education: Not on file     Highest education level: Not on file   Occupational History     Not on file   Tobacco Use     Smoking status: Never     Smokeless tobacco: Never   Substance and Sexual Activity     Alcohol use: Yes     Comment: occ     Drug use: Never     Sexual activity: Not Currently   Other Topics Concern     Not on file   Social History Narrative     Not on file     Social Determinants of Health     Financial Resource Strain: Not on file   Food Insecurity: Not on file   Transportation Needs: Not on file   Physical Activity: Not on file   Stress: Not on file   Social Connections: Not on file   Intimate Partner Violence: Not on file   Housing Stability: Not on file              Family History:     Family History   Problem Relation Age of Onset     Deep Vein Thrombosis (DVT) Mother      Hypertension Mother             Immunizations:     Immunization History   Administered Date(s) Administered     COVID-19 Monovalent 18+ (Moderna) 04/29/2021, 05/27/2021            Allergies:     Allergies   Allergen Reactions     Sulfa Antibiotics      Doxycycline Other (See Comments)     Develops chest tightness  Intolerance not allergy     Penicillins Rash     Generalized rash  Rash, Generalized            Medications:     Current Facility-Administered Medications   Medication     acetaminophen (TYLENOL) solution 650 mg    Or     acetaminophen (TYLENOL) Suppository 650 mg     acetaminophen (TYLENOL) tablet 650 mg     albuterol (PROVENTIL) neb solution 2.5 mg     alum & mag hydroxide-simethicone (MAALOX) suspension 30 mL     apixaban ANTICOAGULANT (ELIQUIS) tablet 5 mg     atorvastatin (LIPITOR) tablet 20 mg     bisacodyl (DULCOLAX) suppository 10 mg     budesonide (PULMICORT) neb solution 0.5 mg      clonazePAM (klonoPIN) tablet 0.5 mg     cyclobenzaprine (FLEXERIL) tablet 5 mg     glucose gel 15-30 g    Or     dextrose 50 % injection 25-50 mL    Or     glucagon injection 1 mg     diltiazem ER COATED BEADS (CARDIZEM CD/CARTIA XT) 24 hr capsule 240 mg     sennosides (SENOKOT) syrup 5-10 mL    And     docusate (COLACE) 50 MG/5ML liquid  mg     hydrALAZINE (APRESOLINE) injection 10 mg     hydrOXYzine (ATARAX) syrup 25 mg    Or     hydrOXYzine (ATARAX) syrup 50 mg     ipratropium (ATROVENT) 0.02 % neb solution 0.5 mg     levalbuterol (XOPENEX) neb solution 0.63 mg     levothyroxine (SYNTHROID/LEVOTHROID) tablet 112 mcg     lidocaine (LMX4) cream     lidocaine 1 % 0.1-1 mL     magnesium oxide (MAG-OX) tablet 400 mg     menthol (ICY HOT) 5 % patch 1 patch    And     menthol (ICY HOT) Patch in Place     multivitamin w/minerals (THERA-VIT-M) tablet 1 tablet     naloxone (NARCAN) injection 0.2 mg    Or     naloxone (NARCAN) injection 0.4 mg    Or     naloxone (NARCAN) injection 0.2 mg    Or     naloxone (NARCAN) injection 0.4 mg     nicotine (NICODERM CQ) 14 MG/24HR 24 hr patch 1 patch     nicotine Patch in Place     ondansetron (ZOFRAN ODT) ODT tab 4 mg    Or     ondansetron (ZOFRAN) injection 4 mg     PARoxetine (PAXIL) tablet 20 mg     Patient is already receiving anticoagulation with heparin, enoxaparin (LOVENOX), warfarin (COUMADIN)  or other anticoagulant medication     polyethylene glycol (MIRALAX) Packet 17 g     prochlorperazine (COMPAZINE) injection 10 mg    Or     prochlorperazine (COMPAZINE) tablet 10 mg    Or     prochlorperazine (COMPAZINE) suppository 25 mg     sodium chloride (NEBUSAL) 3 % neb solution 3 mL     sodium chloride (PF) 0.9% PF flush 3 mL     sodium chloride (PF) 0.9% PF flush 3 mL     sodium chloride (PF) 0.9% PF flush 3 mL               Review of Systems:     Review of Systems   Constitutional:  Negative for fever.   HENT:  Negative for congestion and sore throat.    Respiratory:   "Positive for shortness of breath. Negative for cough, hemoptysis, sputum production and wheezing.    Cardiovascular:  Negative for chest pain and orthopnea.   Gastrointestinal:  Negative for vomiting.   Skin:  Negative for itching and rash.   Neurological:  Positive for weakness. Negative for speech change and focal weakness.              Exam:   /70 (BP Location: Left arm)   Pulse 86   Temp 100.1  F (37.8  C) (Oral)   Resp 20   Ht 1.6 m (5' 3\")   Wt 60 kg (132 lb 4.4 oz)   SpO2 96%   BMI 23.43 kg/m      Vitals:    07/20/23 0542 07/21/23 0700 07/23/23 0622   Weight: 72.1 kg (158 lb 15.2 oz) 69.8 kg (153 lb 14.1 oz) 60 kg (132 lb 4.4 oz)         Physical Exam  Constitutional:       Appearance: Normal appearance.   HENT:      Head:      Comments: Trach in place with ties.  Cap on, patient phonating.      Mouth/Throat:      Pharynx: Oropharynx is clear.   Eyes:      General: No scleral icterus.     Extraocular Movements: Extraocular movements intact.   Cardiovascular:      Rate and Rhythm: Normal rate and regular rhythm.   Pulmonary:      Effort: Pulmonary effort is normal.      Breath sounds: Normal breath sounds. No stridor.   Abdominal:      General: Bowel sounds are normal.      Comments: Colostomy bag in place   Musculoskeletal:         General: No swelling.                Data:   ROUTINE ICU LABS (Last four results)  CMP  Recent Labs   Lab 07/24/23  1052 07/24/23  0631 07/23/23  1753 07/23/23  1042 07/23/23  0928 07/23/23  0628 07/23/23  0204 07/22/23  1814 07/22/23  0604 07/21/23  1750 07/21/23  0626 07/20/23  0753 07/20/23  0748 07/19/23  0825 07/19/23  0516 07/18/23  0749 07/18/23  0622   NA  --   --   --   --   --   --   --   --   --   --   --   --   --   --  134*  --  135*   POTASSIUM  --  3.6  --   --   --  3.6  --   --  3.9  --  4.0  --  3.7  --  3.7  --  3.5   CHLORIDE  --   --   --   --   --   --   --   --   --   --   --   --   --   --  103  --  103   CO2  --   --   --   --   --   --   --   " --   --   --   --   --   --   --  23  --  23   ANIONGAP  --   --   --   --   --   --   --   --   --   --   --   --   --   --  8  --  9   *  --  98  --  100*  --  101*   < >  --    < >  --    < >  --    < > 144*   < > 121*   BUN  --   --   --   --   --   --   --   --   --   --   --   --   --   --  9.0  --  7.8*   CR  --  0.50*  --   --   --   --   --   --   --   --  0.38*  --   --   --  0.39*  --  0.42*   GFRESTIMATED  --  >90  --   --   --   --   --   --   --   --  >90  --   --   --  >90  --  >90   EDIN  --   --   --   --   --   --   --   --   --   --   --   --   --   --  8.4*  --  8.2*   MAG  --   --   --   --   --   --   --   --  1.9  --  1.7  --  1.7  --  1.8  --  1.8   PHOS  --   --   --   --   --   --   --   --  4.6*  --  3.7  --  3.5  --  3.0  --  2.8   PROTTOTAL  --   --   --  6.9  --   --   --   --   --   --   --   --   --   --   --   --   --     < > = values in this interval not displayed.     CBC  Recent Labs   Lab 07/24/23  0631 07/21/23  0626 07/19/23  0516 07/18/23  0622   WBC  --   --  4.8 4.7   RBC  --   --  2.94* 2.82*   HGB  --   --  9.3* 8.8*   HCT  --   --  28.5* 27.7*   MCV  --   --  97 98   MCH  --   --  31.6 31.2   MCHC  --   --  32.6 31.8   RDW  --   --  15.9* 15.8*    231 227 208     INFLAMMATIONNo lab results found in last 7 days.    Invalid input(s):  ESR    INRNo lab results found in last 7 days.  Arterial Blood GasNo lab results found in last 7 days.    ALL CULTURESNo results for input(s): CULT in the last 168 hours.           Imaging:     CXR:   CXR 7/21 with left lower field obscured - pleural effusion vs +/-  atelectasis       The above note was dictated using voice recognition software and may include typographical errors. Please contact the author for any clarifications.

## 2023-07-24 NOTE — PROGRESS NOTES
End of Shift Summary  For vital signs and complete assessments, please see documentation flowsheets.     Pertinent assessments: Pt A&O, on RA, trach in place, CDI. Pt c/O HA, pain at ostomy/incisional site 5/10 Tylenol x 1 given. PRN klonopin given x 1 this shift. Up to BSC, voiding without difficulty. Ostomy in place, passing flatus. Ostomy bag changed this morning due to leaking     Major Shift Events: uneventful     Treatment Plan: Thoracentesis scheduled for today,   pain management, nebs, monitor symptoms, Pulmonology consult for decannulation of trach

## 2023-07-24 NOTE — PROGRESS NOTES
Patient tolerated thoracentesis well. Skin, warm and dry, color pink. 100 mls of leelee fluid removed from left side. Insertion site clean and dry, Band-Aid dressing applied no drainage or bleeding noted. Specimens sent to lab. Vital signs stable. Discharge instructions reviewed with patient. States he understands and has no questions at this time. Return to 545 via cart in stable condition..

## 2023-07-24 NOTE — PLAN OF CARE
Care from [id number]    For vital signs and complete assessments, please see documentation flowsheets.     Pertinent assessments:   A&Ox4 on RA. LS wheezing, trach in place. Frequent cough. Given PRN Tylenol for pain she had after ostomy bag change. Does have an ulcer near stoma, left VM for WOC team and placed note in sticky note. Denies nausea/CP/SOB. Tolerating reg diet. Up to BSC, did want lift as pt was tired from PT session.          Major Shift Events: Thoracentesis schedule 7/24/23  Treatment Plan: Pain management, nebs,  monitor symptoms. SW following for placement.    Bedside Nurse: Jane Gonzales RN

## 2023-07-24 NOTE — PROGRESS NOTES
[facility] Nurse Inpatient Assessment     Consulted for: colostomy due to LBO  Pre-operative diagnosis:         Large bowel obstruction  Post-operative diagnosis        Same as pre-operative diagnosis    Patient continues to empty and change her pouch on her own, siting several reasons why such as it is a bad angle, hard to see and her hands are not working well yet. Is unhappy with it and states it is gross.     Patient History (according to provider note(s):      Acute on chronic hypoxemic and hypercapnic respiratory failure  COPD with acute exacerbation: Has had increasing shortness of breath for a few days secondary to the poor air quality this week.  Has had a cough nonproductive sputum.  Started a Z-Bennett 2 days ago and prednisone 40 mg/day for 1 day then 30 mg/day for 2 days.  Increasing shortness of breath, wheezing, and chest tightness despite using nebs frequently.  PTA on Advair and DuoNebs and albuterol nebs as needed.  Has 2 L O2 at home as needed, but usually does not use.  Tachypneic and tachycardic in the ER.  Hypercapnic initially with VBG showing pH 7.24, PCO2 69, PO2 223, bicarb 29.  Repeat VBG on BiPAP is much improved.  Chest x-ray shows hyperinflated lungs without any new infiltrate.  Slight leukocytosis at 11.8, but has been on prednisone and procalcitonin 0.04.  Received 125 mg IV Solu-Medrol in route    Assessment:      Assessment of new end Colostomy:  Diagnosis Pertinent to Stoma: Bowel Obstruction      Surgery Date: 7/12/2023  Surgeon:Dr Jaspal Rashid        [facility]: [facility]  Pouching system in place on assessment today: [place] one piece, cut to fit, flat and barrier ring   Pouch barrier status: intact  Pouch last changed/wear time: 3-4 days  Reason for pouch change today: pouch not changed today and patient refused today because it was changed 2 x in the last 24 hours  due to her love of carbonated beverages and refusing to empty it   Effectiveness of current  pouching/ supply plan: Effective  Change made with ostomy management today: No  Supplies: at bedside  Last photo: 7/18/23    Stoma location: LUQ  Stoma size: approximately 1 1/4 inches, Red apollo bridge was removed 7/18/23 leaving 2 significant open areas superior and inferior to soma where bridge was located    Stoma appearance: viable, healthy, pink/red, flush  Mucocutaneous junction:  Intact (on 7/18)-open areas are not an MCJ separation but rather are where bridge was   Peristomal complication(s): see above   Output: liquid brown today   Output volume emptied during visit: not emptied today   Abdominal assessment: Soft  Surgical site(s): open to air  NG still in place? No  Pain: denies   Is patient still on a PCA? No    Ostomy education assessment:  Participant of teaching session today: patient   Education completed today: Stoma assessment, Pouching system assessment , Ostomy accessory product use , Odor/flatus management , Lifestyle adjustments , and Discharge instructions  Educational materials/methods: Verbal and Addressed patient/caregiver questions/concerns   Learning Comprehension:   Psychosocial assessment: alert   Patient readiness for education today: attentive and active participation  Following today's visit: patient  and nurse is able to demonstrate;         1. How to empty their pouch? with heavy assist        2. How to change their pouch? Demo provided  and with moderate assist        3. How to read and record intake and output correctly? Demo provided  and Needs additional practice  discussed traveling, bathing and DME process for ordering pouches   Preparation for discharge completed: Placed prescription recommendations in discharge navigator for MD to sign  Pt support system on discharge: family and 2 daughter   WOC recommend home care? Yes if going home  Face to face time: 20 minutes    Treatment Plan:     LUQ Colostomy pouching plan:   Pouching system: ostomy supplies pouches: Michael  "Flat FECAL (559674)   Accessories used: [facility] ostomy accessories: 2\" Cera Barrier Ring (471197), Powder ([id number]) and M9 Drops ([id number])   Frequency of pouch changes: Three times a week  WOC follow up plan: As needed  and Notify WOC if leakage occurs    Orders: Reviewed and Updated    DATA:     Current support surface: Standard  Low air loss (YONAS pump, Isolibrium, Pulsate, skin guard, etc)  Containment of urine/stool: Indwelling catheter and Colostomy pouch  BMI: Body mass index is 23.43 kg/m .   Active diet order: Orders Placed This Encounter      Combination Diet Regular Diet     Output: I/O last 3 completed shifts:  In: -   Out: 195 [Stool:195]     Labs:   Recent Labs   Lab 07/21/23  0626 07/19/23  0516   HGB  --  9.3*   WBC  --  4.8   A1C 5.5  --        Pressure injury risk assessment:   Sensory Perception: 4-->no impairment  Moisture: 4-->rarely moist  Activity: 2-->chairfast  Mobility: 3-->slightly limited  Nutrition: 3-->adequate  Friction and Shear: 2-->potential problem  Lee Score: 18    HOA Golden RN NOELLE Smiley [facility] [employer]  Dept. Office Number: [phone]    "

## 2023-07-24 NOTE — PROGRESS NOTES
[facility]    Medicine Progress Note - Hospitalist Service    Date of Admission:  6/20/2023    Assessment & Plan   Lara Melendez is a 57 year old female with a history of COPD on prn home oxygen 2-3 L/nc, tobacco dependence, htn/hlp, PAF chronically on apixaban, anxiety, hypothyroidism, psoriasis admitted on 6/20/2023 with SOB and wheezing.     Patient has had a protracted admission here at [facility].  Her symptoms reportedly started a few days prior to admission and she called her pulmonologist who initiated empiric azithromycin and prednisone.  Symptoms continued to worsen so EMS was called and she was brought to the ER.  Her respiratory status continued to decline and she required intubation the day after admission.  She has been treated for community-acquired pneumonia along with COPD exacerbation with steroids and antibiotics.  While on the ventilator, patient had significant bronchospasm and high airway pressures requiring deep sedation and paralysis.  [facility] was actually consulted for possibility of ECMO but she was not deemed an appropriate candidate.  She was also found to have a large pneumothorax on 6/26 with chest tube placed by IR which has subsequently been removed (pulled on 7/10).  She has also had recurrent A-fib with RVR requiring both cardioversion and diltiazem with amiodarone.  She was continued on treatment with steroids, nebs, antibiotics and aggressive diuresis.  Paralytics were able to be weaned off but she did require tracheostomy for long-term ventilator weaning.     Patient had worsening abdominal distention on 7/11.  She had a CT scan done that was concerning for large bowel obstruction.  Colorectal surgery was consulted and she underwent surgical repair and colostomy in the early a.m. of 7/12.     Now medically stable and looking into disposition options.       She does continue to have fairly significant bilateral lower extremity weakness which I suspect is in part  related to severe deconditioning and critical illness myopathy.  Lumbar MRI on 7/21 negative for acute pathology or cord compromise.  Plan to manage conservatively for now unless neurology has other suggestions.     X-ray continues to show some left-sided pleural effusion which is symptomatic for her. Thoracentesis scheduled for Monday, 7/24.     Reconsult pulmonology on 7/24.  She continues to have a tracheostomy in place but I suspect that this could be decannulated.  The fact that she has a tracheostomy apparently is complicating her disposition significantly.     #Acute on chronic hypoxic and hypercapnic respiratory failure.   COPD with acute exacerbation.   Left-sided pneumothorax.   PNA with sputum + for klebsiella oxytoca.   Sepsis due to pneumonia.  Lactic acidosis.  Pulmonary edema 2/2 fluid resuscitation for sepsis status post tracheotomy  Left pleural effusion.  Complicated respiratory course-initially intubated due to progressive decline in respiratory function after admission-high airway pressures from severe bronchospasm required deep sedation including paralysis with vec.  She was also treated with 24 hours of continuous albuterol neb.  Lower respiratory track positive for Klebsiella as above along with rhinovirus.  -Patient completed steroid course.  She also completed course of IV antibiotics.  -Chest tube placed by IR on 6/26 for pneumothorax, subsequently removed on 7/10.  -Continue on DuoNebs.    -Appreciate pulmonary consultation.  -IV Lasix given prn for volume overload.   -Will need placement for continued recovery.   -Speech is working with the patient.  Trach down-sized on 7/15 with better comfort and able to talk more.    -Plan for thoracentesis 7/24.     #Ileus.   Large bowel obstruction s/p diverting colostomy.    First tried bowel meds without improvement.  Patient had worsening abdominal distention and pain.  CT abdomen pelvis done on 7/12 showed colonic dilation with abrupt termination  at the mid sigmoid colon with suggestion of pneumatosis.  Colorectal surgery was consulted.  Underwent operative repair early a.m. of 7/12.  Seem to tolerate well.  Appreciate colorectal surgery for postoperative cares.    -Previously requiring tube feedings.    -Now on regular diet with diet supplements of Ensure.     #Paroxysmal AF/SVT:   Pta on diltiazem and apixaban (BB being avoided due to severe COPD). Required DCCV x 2 during this hospitalization.    -Continue apixiban 5 mg twice a day  -amiodarone started during this hospitalization.    -We will trial off amiodarone now that she is so much more stable.    -Restarted her home diltiazem.       #Hypotension:   Did require transient phenylephrine support due to deep sedation. Now resolved.      #CAUTI.  Pseudomonas UTI: Hines cath placed for critical illness, UA 6/28 with inflammatory cells with large blood. UCx with  K with pseudomonas  -rec'd 2 days of liberty followed by 4 days of cefepime, abx completed 7/6  -Hines removed     #Hyperkalemia.   Hypokalemia: Resolved.  PTA losartan has been on hold.  Blood pressure normal.     #Hypernatremia: Resolved.    Significant rise in sodium up to 160 with tube feed initiation.  Improved with adjustment of water flushes.     #Hypertension   pta on dilt and losartan. dilt back on and being titrated. Losartan discontinued in the setting of hyperkalemia     #Anemia    Drifted down to as low as 6.8-otherwise hemodynamically stable. No clear bleeding noted.  Suspect critical illness/venipuncture mediated  -transfused single unit PRBC 7/8    #Hypothyroidism.  -Continue levothyroxine 112 mcg a day.    #Depression.  -Continue paroxetine 20 mg a day.    #Tobacco use disorder.  -Nicotine patch.    I did update multiple family members at bedside on 7/24/2023.     Diet: Snacks/Supplements Adult: Magic Cup; Between Meals  Combination Diet Regular Diet  Snacks/Supplements Adult: Ensure Enlive; With Meals    DVT Prophylaxis:  DOAC  Hines Catheter: Not present  Lines: None     Cardiac Monitoring: None  Code Status: Full Code      Clinically Significant Risk Factors              # Hypoalbuminemia: Lowest albumin = 2.8 g/dL at 7/12/2023  4:43 AM, will monitor as appropriate     # Hypertension: Noted on problem list                 Disposition Plan     Expected Discharge Date: 07/26/2023      Destination: other (comment) ([facility])            Abdelrahman Coughlin DO  Hospitalist Service  [facility]  Securely message with [employer] (more info)  Text page via [employer] Paging/Directory   ______________________________________________________________________    Interval History   Short of breath at times.  Improved from previous.  Denies chest pain, fevers, chills, nausea, vomiting.    Physical Exam   Vital Signs: Temp: 98.6  F (37  C) Temp src: Oral BP: 127/66 Pulse: 90   Resp: 20 SpO2: 97 % O2 Device: None (Room air)    Weight: 132 lbs 4.42 oz    Gen:  NAD, A&Ox3.  Eyes:  PERRL, sclera anicteric.  OP:  MMM, no lesions.  Neck:  Supple.  CV:  Regular, no murmurs.  Lung:  CTA b/l, normal effort.  Ab: Ostomy bag present, +BS, soft.  Skin:  Warm, dry to touch.  No rash.  Ext:  No pitting edema LE b/l.      Medical Decision Making       39 MINUTES SPENT BY ME on the date of service doing chart review, history, exam, documentation & further activities per the note.      Data     I have personally reviewed the following data over the past 24 hrs:    N/A  \   N/A   / 250     N/A N/A N/A /  122 (H)   3.6 N/A 0.50 (L) \       Imaging results reviewed over the past 24 hrs:   No results found for this or any previous visit (from the past 24 hour(s)).

## 2023-07-24 NOTE — PLAN OF CARE
"Care from [id number]  Inpatient Progress Note    /66 (BP Location: Left arm)   Pulse (P) 76   Temp 98.6  F (37  C) (Oral)   Resp (P) 18   Ht 1.6 m (5' 3\")   Wt 60 kg (132 lb 4.4 oz)   SpO2 (P) 95%   BMI 23.43 kg/m      A&O x4. VSS, RA. Up w/ A2 lift this shift. During days delfin steady. Pt denies any pain or discomfort at this time. Up to BSC x2. Colostomy intact, good output and gas. Trach in place. Pt able to call and make needs known.                         "

## 2023-07-25 ENCOUNTER — APPOINTMENT (OUTPATIENT)
Dept: GENERAL RADIOLOGY | Facility: CLINIC | Age: 58
End: 2023-07-25
Attending: INTERNAL MEDICINE
Payer: COMMERCIAL

## 2023-07-25 ENCOUNTER — APPOINTMENT (OUTPATIENT)
Dept: PHYSICAL THERAPY | Facility: CLINIC | Age: 58
End: 2023-07-25
Payer: COMMERCIAL

## 2023-07-25 LAB
ANION GAP SERPL CALCULATED.3IONS-SCNC: 9 MMOL/L (ref 7–15)
BUN SERPL-MCNC: 1.9 MG/DL (ref 8–23)
CALCIUM SERPL-MCNC: 8.9 MG/DL (ref 8.6–10)
CHLORIDE SERPL-SCNC: 101 MMOL/L (ref 98–107)
CREAT SERPL-MCNC: 0.48 MG/DL (ref 0.51–0.95)
DEPRECATED HCO3 PLAS-SCNC: 26 MMOL/L (ref 22–29)
ERYTHROCYTE [DISTWIDTH] IN BLOOD BY AUTOMATED COUNT: 15.2 % (ref 10–15)
GFR SERPL CREATININE-BSD FRML MDRD: >90 ML/MIN/1.73M2
GLUCOSE SERPL-MCNC: 103 MG/DL (ref 70–99)
HCT VFR BLD AUTO: 28.4 % (ref 35–47)
HGB BLD-MCNC: 9 G/DL (ref 11.7–15.7)
MCH RBC QN AUTO: 29.9 PG (ref 26.5–33)
MCHC RBC AUTO-ENTMCNC: 31.7 G/DL (ref 31.5–36.5)
MCV RBC AUTO: 94 FL (ref 78–100)
PLATELET # BLD AUTO: 261 10E3/UL (ref 150–450)
POTASSIUM SERPL-SCNC: 3.7 MMOL/L (ref 3.4–5.3)
RBC # BLD AUTO: 3.01 10E6/UL (ref 3.8–5.2)
SODIUM SERPL-SCNC: 136 MMOL/L (ref 136–145)
WBC # BLD AUTO: 5.6 10E3/UL (ref 4–11)

## 2023-07-25 PROCEDURE — 250N000013 HC RX MED GY IP 250 OP 250 PS 637: Performed by: INTERNAL MEDICINE

## 2023-07-25 PROCEDURE — 250N000009 HC RX 250: Performed by: INTERNAL MEDICINE

## 2023-07-25 PROCEDURE — G0463 HOSPITAL OUTPT CLINIC VISIT: HCPCS

## 2023-07-25 PROCEDURE — 97530 THERAPEUTIC ACTIVITIES: CPT | Mod: GP

## 2023-07-25 PROCEDURE — 36415 COLL VENOUS BLD VENIPUNCTURE: CPT | Performed by: INTERNAL MEDICINE

## 2023-07-25 PROCEDURE — 94640 AIRWAY INHALATION TREATMENT: CPT | Mod: 76

## 2023-07-25 PROCEDURE — 250N000013 HC RX MED GY IP 250 OP 250 PS 637: Performed by: HOSPITALIST

## 2023-07-25 PROCEDURE — 71045 X-RAY EXAM CHEST 1 VIEW: CPT

## 2023-07-25 PROCEDURE — 120N000001 HC R&B MED SURG/OB

## 2023-07-25 PROCEDURE — 999N000157 HC STATISTIC RCP TIME EA 10 MIN

## 2023-07-25 PROCEDURE — 99231 SBSQ HOSP IP/OBS SF/LOW 25: CPT | Performed by: INTERNAL MEDICINE

## 2023-07-25 PROCEDURE — 85027 COMPLETE CBC AUTOMATED: CPT | Performed by: INTERNAL MEDICINE

## 2023-07-25 PROCEDURE — 82310 ASSAY OF CALCIUM: CPT | Performed by: INTERNAL MEDICINE

## 2023-07-25 PROCEDURE — 97110 THERAPEUTIC EXERCISES: CPT | Mod: GP

## 2023-07-25 PROCEDURE — 94640 AIRWAY INHALATION TREATMENT: CPT

## 2023-07-25 RX ADMIN — CLONAZEPAM 0.5 MG: 0.5 TABLET ORAL at 13:26

## 2023-07-25 RX ADMIN — PAROXETINE HYDROCHLORIDE 20 MG: 20 TABLET, FILM COATED ORAL at 09:06

## 2023-07-25 RX ADMIN — IPRATROPIUM BROMIDE 0.5 MG: 0.5 SOLUTION RESPIRATORY (INHALATION) at 20:18

## 2023-07-25 RX ADMIN — LEVALBUTEROL HYDROCHLORIDE 0.63 MG: 0.63 SOLUTION RESPIRATORY (INHALATION) at 07:31

## 2023-07-25 RX ADMIN — ACETAMINOPHEN 650 MG: 325 TABLET, FILM COATED ORAL at 02:50

## 2023-07-25 RX ADMIN — NICOTINE 1 PATCH: 14 PATCH, EXTENDED RELEASE TRANSDERMAL at 09:06

## 2023-07-25 RX ADMIN — LEVALBUTEROL HYDROCHLORIDE 0.63 MG: 0.63 SOLUTION RESPIRATORY (INHALATION) at 13:20

## 2023-07-25 RX ADMIN — APIXABAN 5 MG: 5 TABLET, FILM COATED ORAL at 21:06

## 2023-07-25 RX ADMIN — MAGNESIUM OXIDE TAB 400 MG (241.3 MG ELEMENTAL MG) 400 MG: 400 (241.3 MG) TAB at 09:06

## 2023-07-25 RX ADMIN — LEVALBUTEROL HYDROCHLORIDE 0.63 MG: 0.63 SOLUTION RESPIRATORY (INHALATION) at 20:18

## 2023-07-25 RX ADMIN — IPRATROPIUM BROMIDE 0.5 MG: 0.5 SOLUTION RESPIRATORY (INHALATION) at 15:41

## 2023-07-25 RX ADMIN — ACETAMINOPHEN 650 MG: 325 TABLET, FILM COATED ORAL at 21:06

## 2023-07-25 RX ADMIN — BUDESONIDE 0.5 MG: 0.5 INHALANT ORAL at 20:18

## 2023-07-25 RX ADMIN — LEVOTHYROXINE SODIUM 112 MCG: 0.11 TABLET ORAL at 09:06

## 2023-07-25 RX ADMIN — LEVALBUTEROL HYDROCHLORIDE 0.63 MG: 0.63 SOLUTION RESPIRATORY (INHALATION) at 15:41

## 2023-07-25 RX ADMIN — DILTIAZEM HYDROCHLORIDE 240 MG: 120 CAPSULE, COATED, EXTENDED RELEASE ORAL at 09:06

## 2023-07-25 RX ADMIN — ATORVASTATIN CALCIUM 20 MG: 20 TABLET, FILM COATED ORAL at 09:06

## 2023-07-25 RX ADMIN — BUDESONIDE 0.5 MG: 0.5 INHALANT ORAL at 07:31

## 2023-07-25 RX ADMIN — ACETAMINOPHEN 650 MG: 325 TABLET, FILM COATED ORAL at 11:51

## 2023-07-25 RX ADMIN — APIXABAN 5 MG: 5 TABLET, FILM COATED ORAL at 09:06

## 2023-07-25 ASSESSMENT — ACTIVITIES OF DAILY LIVING (ADL)
ADLS_ACUITY_SCORE: 25
ADLS_ACUITY_SCORE: 29
ADLS_ACUITY_SCORE: 25
ADLS_ACUITY_SCORE: 29
ADLS_ACUITY_SCORE: 25
ADLS_ACUITY_SCORE: 25

## 2023-07-25 NOTE — PLAN OF CARE
"/71 (BP Location: Right arm)   Pulse 88   Temp 98.6  F (37  C) (Oral)   Resp 18   Ht 1.6 m (5' 3\")   Wt 60 kg (132 lb 4.4 oz)   SpO2 95%   BMI 23.43 kg/m      Neuro: a/o x4  Cardiac: WNL  Lungs: WNL  GI: colostomy- changed today   : bedside commode/bathroom  Pain: denies  IV: saline locked  Meds: eliquis, nicotine patch R shoulder  Diet: reg  Activity: assist x2/lift/SS  Misc: WOC consult. Trach site covered.   Plan: Currently resting in bed, able to make need known.     "

## 2023-07-25 NOTE — PLAN OF CARE
End of Shift Summary  For vital signs and complete assessments, please see documentation flowsheets.     Pertinent assessments: A&Ox4. VSS on RA. Tylenol given x2 for c/o abdominal pain. Ostomy in place with small output. Patient up with lift to commode. Regular diet. Slept well.     Major Shift Events: uneventful     Treatment Plan: pain control, colostomy management teaching, and physical therapy.      Bedside Nurse: Frances Lin RN

## 2023-07-25 NOTE — PROGRESS NOTES
[facility]    Medicine Progress Note - Hospitalist Service    Date of Admission:  6/20/2023    Assessment & Plan   Lara Melendez is a 57 year old female with a history of COPD on prn home oxygen 2-3 L/nc, tobacco dependence, htn/hlp, PAF chronically on apixaban, anxiety, hypothyroidism, psoriasis admitted on 6/20/2023 with SOB and wheezing.     Patient has had a protracted admission here at [facility].  Her symptoms reportedly started a few days prior to admission and she called her pulmonologist who initiated empiric azithromycin and prednisone.  Symptoms continued to worsen so EMS was called and she was brought to the ER.  Her respiratory status continued to decline and she required intubation the day after admission.  She has been treated for community-acquired pneumonia along with COPD exacerbation with steroids and antibiotics.  While on the ventilator, patient had significant bronchospasm and high airway pressures requiring deep sedation and paralysis.  [facility] was actually consulted for possibility of ECMO but she was not deemed an appropriate candidate.  She was also found to have a large pneumothorax on 6/26 with chest tube placed by IR which has subsequently been removed (pulled on 7/10).  She has also had recurrent A-fib with RVR requiring both cardioversion and diltiazem with amiodarone.  She was continued on treatment with steroids, nebs, antibiotics and aggressive diuresis.  Paralytics were able to be weaned off but she did require tracheostomy for long-term ventilator weaning.     Patient had worsening abdominal distention on 7/11.  She had a CT scan done that was concerning for large bowel obstruction.  Colorectal surgery was consulted and she underwent surgical repair and colostomy in the early a.m. of 7/12.     Now medically stable and looking into disposition options.       She does continue to have fairly significant bilateral lower extremity weakness which I suspect is in part  related to severe deconditioning and critical illness myopathy.  Lumbar MRI on 7/21 negative for acute pathology or cord compromise.  Plan to manage conservatively for now unless neurology has other suggestions.     X-ray continues to show some left-sided pleural effusion which is symptomatic for her. Thoracentesis scheduled for Monday, 7/24.     Reconsult pulmonology on 7/24.  She continues to have a tracheostomy in place but I suspect that this could be decannulated.  The fact that she has a tracheostomy apparently is complicating her disposition significantly.     #Acute on chronic hypoxic and hypercapnic respiratory failure.   COPD with acute exacerbation.   Left-sided pneumothorax.   PNA with sputum + for klebsiella oxytoca.   Sepsis due to pneumonia.  Lactic acidosis.  Pulmonary edema 2/2 fluid resuscitation for sepsis status post tracheotomy  Left pleural effusion.  Complicated respiratory course-initially intubated due to progressive decline in respiratory function after admission-high airway pressures from severe bronchospasm required deep sedation including paralysis with vec.  She was also treated with 24 hours of continuous albuterol neb.  Lower respiratory track positive for Klebsiella as above along with rhinovirus.  -Patient completed steroid course.  She also completed course of IV antibiotics.  -Chest tube placed by IR on 6/26 for pneumothorax, subsequently removed on 7/10.  -Continue on DuoNebs.    -Appreciate pulmonary consultation.  -IV Lasix given prn for volume overload.   -Will need placement for continued recovery.   -Speech is working with the patient.  Trach down-sized on 7/15 with better comfort and able to talk more.    -Had thoracentesis 7/24.  -Trach tube removed by pulmonology 7/24.     #Ileus.   Large bowel obstruction s/p diverting colostomy.    First tried bowel meds without improvement.  Patient had worsening abdominal distention and pain.  CT abdomen pelvis done on 7/12 showed  colonic dilation with abrupt termination at the mid sigmoid colon with suggestion of pneumatosis.  Colorectal surgery was consulted.  Underwent operative repair early a.m. of 7/12.  Seem to tolerate well.  Appreciate colorectal surgery for postoperative cares.    -Previously requiring tube feedings.    -Now on regular diet with diet supplements of Ensure.     #Paroxysmal AF/SVT:   Pta on diltiazem and apixaban (BB being avoided due to severe COPD). Required DCCV x 2 during this hospitalization.    -Continue apixiban 5 mg twice a day  -amiodarone started during this hospitalization.    -We will trial off amiodarone now that she is so much more stable.    -Restarted her home diltiazem.    -Continue diltiazem extended release 140 mg a day.     #Hypotension:   Did require transient phenylephrine support due to deep sedation. Now resolved.      #CAUTI.  Pseudomonas UTI: Hines cath placed for critical illness, UA 6/28 with inflammatory cells with large blood. UCx with  K with pseudomonas  -rec'd 2 days of liberty followed by 4 days of cefepime, abx completed 7/6  -Hines removed     #Hyperkalemia.   Hypokalemia: Resolved.  PTA losartan has been on hold.  Blood pressure normal.     #Hypernatremia: Resolved.    Significant rise in sodium up to 160 with tube feed initiation.  Improved with adjustment of water flushes.     #Hypertension   pta on dilt and losartan. dilt back on and being titrated. Losartan discontinued in the setting of hyperkalemia     #Anemia    Drifted down to as low as 6.8-otherwise hemodynamically stable. No clear bleeding noted.  Suspect critical illness/venipuncture mediated  -transfused single unit PRBC 7/8     #Hypothyroidism.  -Continue levothyroxine 112 mcg a day.     #Depression.  -Continue paroxetine 20 mg a day.     #Tobacco use disorder.  -Nicotine patch.          Diet: Combination Diet Regular Diet  Snacks/Supplements Adult: Ensure Enlive; With Meals    DVT Prophylaxis: DOAC  Hines Catheter:  Not present  Lines: None     Cardiac Monitoring: None  Code Status: Full Code      Clinically Significant Risk Factors              # Hypoalbuminemia: Lowest albumin = 2.8 g/dL at 7/12/2023  4:43 AM, will monitor as appropriate     # Hypertension: Noted on problem list                 Disposition Plan     Expected Discharge Date: 07/26/2023      Destination: other (comment) ([facility])            Abdelrahman Coughlin DO  Hospitalist Service  [facility]  Securely message with [employer] (more info)  Text page via [employer] Paging/Directory   ______________________________________________________________________    Interval History   Occasional abdominal pain.  Denies chest pain, shortness of breath, fevers, chills, nausea, or vomiting.    Physical Exam   Vital Signs: Temp: 98.6  F (37  C) Temp src: Oral BP: 119/71 Pulse: 88   Resp: 18 SpO2: 95 % O2 Device: None (Room air)    Weight: 132 lbs 4.42 oz    Gen:  NAD, A&Ox3.  Eyes:  PERRL, sclera anicteric.  OP:  MMM, no lesions.  Neck:  Supple.  Bandage over trach site not removed.  CV:  Regular, no murmurs.  Lung:  CTA b/l, normal effort.  Ab: Ostomy bag present, +BS, soft.  Skin:  Warm, dry to touch.  No rash.  Ext:  No pitting edema LE b/l.      Medical Decision Making       30 MINUTES SPENT BY ME on the date of service doing chart review, history, exam, documentation & further activities per the note.      Data     I have personally reviewed the following data over the past 24 hrs:    5.6  \   9.0 (L)   / 261     136 101 1.9 (L) /  103 (H)   3.7 26 0.48 (L) \       Imaging results reviewed over the past 24 hrs:   Recent Results (from the past 24 hour(s))   US Thoracentesis    Narrative    ULTRASOUND GUIDED THORACENTESIS  7/24/2023 2:30 PM     HISTORY: Prolonged critical illness, now recovered but with moderate  left pleural effusion which is symptomatic.    FINDINGS: Limited ultrasound was performed to evaluate for the  presence and best approach for  drainage of a pleural effusion. An  image is archived. Written and oral informed consent was obtained. A  pause for the cause procedure to verify the correct patient and  correct procedure.     The skin overlying the left chest posteriorly was prepped and draped  in the usual sterile fashion. The subcutaneous tissues were  anesthetized with 1%. Under direct ultrasound guidance a catheter was  advanced into the pleural space and 100 mL of  leelee colored fluid was  drained. The catheter was removed and a sterile dressing was applied.     Patient was monitored by nurse under my direct supervision throughout  the exam. Ultrasound images were permanently stored.  There were no  immediate complications. Patient left the ultrasound suite in  satisfactory condition.      Impression    IMPRESSION: Technically successful thoracentesis without immediate  complications.    WENDY MEYER DO         SYSTEM ID:  [id number]   XR Chest Port 1 View    Narrative    EXAM: XR CHEST PORT 1 VIEW  LOCATION: [facility]  DATE: 7/25/2023    INDICATION: s p thoracentesis, assess for pneumo, status of effusion  COMPARISON: 07/21/2023      Impression    IMPRESSION:   Interval removal of tracheostomy tube. No pneumothorax. Heart size and pulmonary vascularity within normal limits. Mild blunting of the left costophrenic angle which may be due to small amount of thickened pleura. Mild to moderate left   basilar atelectasis or consolidation. Osseous structures grossly intact. Visualized upper abdomen unremarkable.

## 2023-07-25 NOTE — PLAN OF CARE
Goal Outcome Evaluation:       Patient transitioned off EN on 7/21. Continue to encourage PO intake through oral nutritional supplements and food from home as able.     Milagro Pereira RD, LD  Clinical Dietitian     3rd floor/ICU: [phone]  All other floors: [phone]  Weekend/holiday: [phone]  Office: [phone]

## 2023-07-25 NOTE — PLAN OF CARE
Goal Outcome Evaluation:       End of Shift Summary  For vital signs and complete assessments, please see documentation flowsheets.     Pertinent assessments: Pt A&OX4. Vss and on RA. LS dim and some infrequent non-productive cough noted. Denied pain this shift. Worked with therapy and leg strength improving. Tolerated diet and no nausea reported. Pt up with sera steady to BSC. Ostomy with small soft BM. Trach site CDI.    Major Shift Events :None    Treatment Plan:Pain management, PT, colostomy management.

## 2023-07-25 NOTE — PROGRESS NOTES
CLINICAL NUTRITION SERVICES - REASSESSMENT NOTE    Recommendations Ordered by Registered Dietitian (RD):   Diet per MD   Change Ensure to once per day with breakfast   Continue MVI+M as ordered   Malnutrition:   % Weight Loss:  Weight loss does not meet criteria for malnutrition as it is not significant w/ fluid losses   % Intake:  Decreased intake does not meet criteria for malnutrition   Subcutaneous Fat Loss:  Upper arm region mild depletion - will not use given only once indicator   Muscle Loss:  Temporal region mild depletion and Clavicle bone region mild depletion  Fluid Retention: trace-mild    Malnutrition Diagnosis: Patient does not meet two of the above criteria necessary for diagnosing malnutrition     EVALUATION OF PROGRESS TOWARD GOALS   Diet: Regular Diet + Ensure Enlive with dinner + Magic Cup at 10am and 2pm    Intake/Tolerance: Upon review of the flowsheets, pt is consuming % of meals ordered. Ordering 1-3 meals per day. Receiving Ensure with breakfast and dinner and not receiving magic cup.     Patient reports a good appetite however she does not enjoy the food options offered by the hospital. She feels most of the food items are too dry. Her  has been able to bring in food from outside the hospital such as pizza and [employer]. Appreciate all encouragement done by nursing. We discussed the ensure she is receiving, she would like to change this to once per day. Overall encouraged protein with meals to aide in preservation of lean body mass.     Previously on EN which was cycled on 7/18 and discontinued on 7/21.     ASSESSED NUTRITION NEEDS:  Dose weight: 60 kg - most updated weight   Estimated Energy Needs: 8905-3436 kcal/day (25-30 kcal/kg)  Justification: maintenance   Estimated Protein Needs: 60-72+ grams protein/day (1-1.2+ grams of pro/kg)  Justification: maintenance, preservation of lean body mass  Estimated Fluid Needs: per MD     NEW FINDINGS:   - WOC following - new end  colostomy   - Trach removed on 7/24  - Neurology following   - Pulmonology following   - labs:  Labs:  Electrolytes  Potassium (mmol/L)   Date Value   07/25/2023 3.7   07/24/2023 3.6   07/23/2023 3.6   05/17/2022 4.5   05/16/2022 4.6   05/15/2022 4.1   02/02/2006 3.4   02/01/2006 3.5   01/31/2006 3.9     Potassium POCT (mmol/L)   Date Value   06/21/2023 5.1     Phosphorus (mg/dL)   Date Value   07/22/2023 4.6 (H)   07/21/2023 3.7   07/20/2023 3.5   07/19/2023 3.0   07/18/2023 2.8   01/30/2006 3.1    Blood Glucose  Glucose (mg/dL)   Date Value   07/25/2023 103 (H)   05/17/2022 124 (H)   05/16/2022 138 (H)   05/15/2022 138 (H)   05/14/2022 120 (H)   05/14/2022 120 (H)   05/14/2022 118 (H)   02/02/2006 90   02/01/2006 90   01/31/2006 95   01/30/2006 106   01/30/2006 144 (H)     GLUCOSE BY METER POCT (mg/dL)   Date Value   07/24/2023 122 (H)   07/23/2023 98   07/23/2023 100 (H)   07/23/2023 101 (H)   07/22/2023 105 (H)     Hemoglobin A1C (%)   Date Value   07/21/2023 5.5   03/26/2023 4.9   12/01/2022 4.4    Inflammatory Markers  CRP Inflammation (mg/L)   Date Value   05/17/2022 <2.9   05/16/2022 <2.9   05/15/2022 3.6     WBC (10e9/L)   Date Value   02/02/2006 6.2   02/01/2006 7.7   01/31/2006 8.2     WBC Count (10e3/uL)   Date Value   07/25/2023 5.6   07/19/2023 4.8   07/18/2023 4.7     Albumin (g/dL)   Date Value   07/12/2023 2.8 (L)   07/09/2023 2.9 (L)   06/30/2023 3.3 (L)   05/15/2022 3.0 (L)   05/14/2022 3.5   05/14/2022 3.5   02/01/2006 2.3 (L)   01/30/2006 2.5 (L)   01/30/2006 2.9 (L)      Magnesium (mg/dL)   Date Value   07/22/2023 1.9   07/21/2023 1.7   07/20/2023 1.7   01/31/2006 2.0   01/31/2006 1.4 (L)   01/30/2006 2.6 (H)     Magnesium Laxative Screen (no units)   Date Value   01/30/2006 91.0     Sodium (mmol/L)   Date Value   07/25/2023 136   07/19/2023 134 (L)   07/18/2023 135 (L)   02/02/2006 133   02/01/2006 135   01/31/2006 130 (L)    Renal  Urea Nitrogen (mg/dL)   Date Value   07/25/2023 1.9 (L)    07/19/2023 9.0   07/18/2023 7.8 (L)   05/17/2022 17   05/16/2022 14   05/15/2022 10   02/02/2006 <2 (L)   02/01/2006 3 (L)   01/31/2006 4 (L)     UREA NITROGEN POCT (mg/dL)   Date Value   06/21/2023 7     Creatinine (mg/dL)   Date Value   07/25/2023 0.48 (L)   07/24/2023 0.50 (L)   07/21/2023 0.38 (L)   02/02/2006 0.80   02/01/2006 0.90   01/31/2006 0.90     Additional  Triglycerides (mg/dL)   Date Value   07/08/2023 111   06/30/2023 97   06/29/2023 76     Ketones Urine (mg/dL)   Date Value   06/28/2023 Negative   01/29/2006 Negative        - medications:   apixaban ANTICOAGULANT  5 mg Oral BID    atorvastatin  20 mg Per Feeding Tube Daily    budesonide  0.5 mg Nebulization BID    diltiazem ER COATED BEADS  240 mg Oral Daily    ipratropium  0.5 mg Nebulization 4x daily    levalbuterol  0.63 mg Nebulization 4x Daily    levothyroxine  112 mcg Oral Daily    magnesium oxide  400 mg Oral Daily    menthol   Transdermal Q8H    multivitamin w/minerals  1 tablet Oral Daily    nicotine  1 patch Transdermal Daily    nicotine   Transdermal Q8H    PARoxetine  20 mg Oral Daily    sodium chloride (PF)  3 mL Intracatheter Q8H       - MEDICATION INSTRUCTIONS -        acetaminophen **OR** acetaminophen, acetaminophen **OR** [DISCONTINUED] acetaminophen, albuterol, alum & mag hydroxide-simethicone, bisacodyl, clonazePAM, cyclobenzaprine, glucose **OR** dextrose **OR** glucagon, sennosides **AND** docusate, hydrALAZINE, hydrOXYzine **OR** hydrOXYzine, lidocaine 4%, lidocaine (buffered or not buffered), menthol **AND** menthol, naloxone **OR** naloxone **OR** naloxone **OR** naloxone, ondansetron **OR** ondansetron, - MEDICATION INSTRUCTIONS -, polyethylene glycol, prochlorperazine **OR** prochlorperazine **OR** prochlorperazine, sodium chloride, sodium chloride (PF), sodium chloride (PF)     - weight:  Vitals:    06/21/23 0304 06/23/23 0345 06/24/23 0600 06/25/23 0530   Weight: 64.5 kg (142 lb 3.2 oz) 67.7 kg (149 lb 4 oz) 73.7 kg  (162 lb 7.7 oz) 77 kg (169 lb 12.1 oz)    06/26/23 0615 06/27/23 0600 06/28/23 0556 06/29/23 0430   Weight: 76.4 kg (168 lb 6.9 oz) 75.3 kg (166 lb 0.1 oz) 75.5 kg (166 lb 7.2 oz) 78.2 kg (172 lb 6.4 oz)    06/30/23 0600 07/01/23 0448 07/02/23 0529 07/03/23 0430   Weight: 79.1 kg (174 lb 6.1 oz) 79.1 kg (174 lb 6.1 oz) 80.6 kg (177 lb 11.1 oz) 76.1 kg (167 lb 12.3 oz)    07/04/23 0630 07/05/23 0615 07/06/23 0300 07/07/23 0600   Weight: 72.3 kg (159 lb 6.3 oz) 68.3 kg (150 lb 9.2 oz) 68.1 kg (150 lb 2.1 oz) 67 kg (147 lb 11.3 oz)    07/08/23 0445 07/09/23 0552 07/10/23 0600 07/11/23 0500   Weight: 68.6 kg (151 lb 3.8 oz) 69.9 kg (154 lb 1.6 oz) 69.6 kg (153 lb 7 oz) 70.6 kg (155 lb 10.3 oz)    07/13/23 0600 07/14/23 0400 07/15/23 0545 07/16/23 0700   Weight: 68.5 kg (151 lb 0.2 oz) 69.3 kg (152 lb 12.5 oz) 69.6 kg (153 lb 7 oz) 68.1 kg (150 lb 2.1 oz)    07/17/23 0600 07/20/23 0542 07/21/23 0700 07/23/23 0622   Weight: 65.5 kg (144 lb 6.4 oz) 72.1 kg (158 lb 15.2 oz) 69.8 kg (153 lb 14.1 oz) 60 kg (132 lb 4.4 oz)     Previous Goals:   Patient to consume % of nutritionally adequate meals three times per day, or the equivalent with supplements/snacks.  Evaluation: Not met    Total avg nutritional intake to meet a minimum of 1615 kcal/kg and 98 g PRO/kg daily (per dosing wt 64.5 kg).  Evaluation: Unable to evaluate    Previous Nutrition Diagnosis:   Inadequate oral intake related to continuous TF as evidenced by feelings of fullness before meals   Evaluation: Completed    CURRENT NUTRITION DIAGNOSIS  Inadequate oral intake related to menu fatigue/dislikes as evidenced by patient likely consuming <75% of nutritional needs over the past > 4 days.     INTERVENTIONS  Recommendations / Nutrition Prescription  Diet per MD   Change Ensure to once per day with breakfast   Continue MVI+M as ordered    Implementation  Medical Food Supplement, Multivitamin/Mineral: as above  Collaboration and Referral of Nutrition care:  discussed patient during interdisciplinary rounds this morning     Goals  Patient to consume at least 50% of nutritionally adequate meals three times per day, or the equivalent with supplements/snacks to show improvement in PO intake     MONITORING AND EVALUATION:  Progress towards goals will be monitored and evaluated per protocol and Practice Guidelines    Milagro Pereira RD, LD  Clinical Dietitian     3rd floor/ICU: [phone]  All other floors: [phone]  Weekend/holiday: [phone]  Office: [phone]

## 2023-07-25 NOTE — PROGRESS NOTES
Care Management Follow Up    Length of Stay (days): 34    Expected Discharge Date: 07/26/2023     Concerns to be Addressed:       Patient plan of care discussed at interdisciplinary rounds: Yes    Anticipated Discharge Disposition: Transitional Care, Acute Rehab     Anticipated Discharge Services:    Anticipated Discharge DME:      Patient/family educated on Medicare website which has current facility and service quality ratings: yes  Education Provided on the Discharge Plan:    Patient/Family in Agreement with the Plan:      Referrals Placed by CM/SW: Post Acute Facilities  Private pay costs discussed: Not applicable    Additional Information:  Contacted [facility]U to re-review patient for possible ARU admission. Patient is now de-cannulated and tube feedings off. Making progress with PT. If not a candidate will send referrals for TCU.    Addendum:  ARU liaison has reviewed and feel at this point as patient has not demonstrated several days of consistency with therapies they would continue to recommend TCU. If in the next few days we have not secured TCU and patient is demonstrating more consistency and willingness to work with therapy ARU will again review.  Will begin to send referrals for TCU.    Addendum 1325:  Met with patient regarding discharge planning. Explained that currently ARU has not accepted and discussed TCU options. Referrals have been sent to [facility], [facility] and [facility].      Bhakti Walters RN BSN OCN  Care Coordinator  [facility]  [phone]

## 2023-07-25 NOTE — PROGRESS NOTES
[facility] Nurse Inpatient Assessment     Consulted for: colostomy due to LBO  Pre-operative diagnosis:         Large bowel obstruction  Post-operative diagnosis        Same as pre-operative diagnosis      Patient History (according to provider note(s):      Acute on chronic hypoxemic and hypercapnic respiratory failure  COPD with acute exacerbation: Has had increasing shortness of breath for a few days secondary to the poor air quality this week.  Has had a cough nonproductive sputum.  Started a Z-Bennett 2 days ago and prednisone 40 mg/day for 1 day then 30 mg/day for 2 days.  Increasing shortness of breath, wheezing, and chest tightness despite using nebs frequently.  PTA on Advair and DuoNebs and albuterol nebs as needed.  Has 2 L O2 at home as needed, but usually does not use.  Tachypneic and tachycardic in the ER.  Hypercapnic initially with VBG showing pH 7.24, PCO2 69, PO2 223, bicarb 29.  Repeat VBG on BiPAP is much improved.  Chest x-ray shows hyperinflated lungs without any new infiltrate.  Slight leukocytosis at 11.8, but has been on prednisone and procalcitonin 0.04.  Received 125 mg IV Solu-Medrol in route    Assessment:      Assessment of new end Colostomy:  Diagnosis Pertinent to Stoma: Bowel Obstruction      Surgery Date: 7/12/2023  Surgeon:Dr Jaspal Rashid        [facility]  Pouching system in place on assessment today: [place] one piece, cut to fit, flat and barrier ring   Pouch barrier status: intact  Pouch last changed/wear time: 3-4 days  Reason for pouch change today: ostomy education and leakage   Effectiveness of current pouching/ supply plan: Attempting new system, will re-evaluate next assessment  Change made with ostomy management today: Yes  Supplies: at bedside, ordered Aquacel Ag   Last photo: 7/25/23 7/18/23    Stoma location: LUQ  Stoma size: 1 1/4 x 1 inches   Stoma appearance: viable, healthy, pink/red, flush  Mucocutaneous junction:  Intact  "  Peristomal complication(s): full thickness wounds at 6 and 7 o'clock measuring 1.5x2x0.5cm and 0.7x1.2x0.3cm  Output: liquid brown today   Output volume emptied during visit: not emptied today   Abdominal assessment: Soft  Surgical site(s): open to air  NG still in place? No  Pain: moderate    Ostomy education assessment:  Participant of teaching session today: patient   Education completed today: Stoma assessment, Pouching system assessment , Evaluate leakage issue, Refitting of appliance , Pouch change return demonstration, Ostomy accessory product use , Pouch emptying demonstration, Fluid and electrolyte balance , Importance of hydration, When to seek medical attention, Odor/flatus management , Lifestyle adjustments , and Discharge instructions  Educational materials/methods: Verbal and Addressed patient/caregiver questions/concerns   Learning Comprehension:   Psychosocial assessment: alert   Patient readiness for education today: attentive and active participation  Following today's visit: patient  and nurse is able to demonstrate;         1. How to empty their pouch? Demo provided         2. How to change their pouch? with moderate assist        3. How to read and record intake and output correctly? Demo provided  and Needs additional practice  discussed traveling, bathing and DME process for ordering pouches   Preparation for discharge completed: Placed prescription recommendations in discharge navigator for MD to sign  Pt support system on discharge: family and 2 daughter   [facility] recommend home care? Yes if going home  Face to face time: 30 minutes    Treatment Plan:     LUQ Colostomy pouching plan:   Pouching system: ostomy supplies pouches: [place] FECAL ([id number])   Accessories used: [facility] ostomy accessories: 2\" Cera Barrier Ring ([id number]), Powder ([id number]), M9 Drops ([id number]), and Aquacel Ag to full thickness wounds    Frequency of pouch changes: Three times a week  [facility] follow up plan: As needed  and " Notify [facility] if leakage occurs    Orders: Reviewed and Updated    DATA:     Current support surface: Standard  Low air loss (YONAS pump, Isolibrium, Pulsate, skin guard, etc)  Containment of urine/stool: Colostomy pouch  BMI: Body mass index is 23.43 kg/m .   Active diet order: Orders Placed This Encounter      Combination Diet Regular Diet     Output: I/O last 3 completed shifts:  In: -   Out: 200 [Stool:200]     Labs:   Recent Labs   Lab 07/25/23  0705 07/21/23  0626   HGB 9.0*  --    WBC 5.6  --    A1C  --  5.5       Pressure injury risk assessment:   Sensory Perception: 4-->no impairment  Moisture: 4-->rarely moist  Activity: 2-->chairfast  Mobility: 3-->slightly limited  Nutrition: 2-->probably inadequate  Friction and Shear: 3-->no apparent problem  Lee Score: 18    EDINSON Alexander [facility] [employer] [facility]  Dept. Office Number: [phone]

## 2023-07-25 NOTE — PLAN OF CARE
"/71 (BP Location: Right arm)   Pulse 88   Temp 98.6  F (37  C) (Oral)   Resp 18   Ht 1.6 m (5' 3\")   Wt 60 kg (132 lb 4.4 oz)   SpO2 95%   BMI 23.43 kg/m      Neuro: A/Ox4  Cardiac: WDL  Lungs: Trach. Out yesterday-drainage through dressing-RT paged  GI: Colostomy-redness around bag site-WOC consulted, replaced and addressed skin irritation  : Continent  Pain: 5/10. Tylenol given with relief  IV: L PIV, SL  Meds: Elequis  Labs/tests: Thoracentesis yesterday-100 mL removed  Diet: Reg.  Activity: Ax2 Lift  Misc: SW/PT/OT following  Plan: Currently resting in bed, able to make need known.     "

## 2023-07-25 NOTE — PLAN OF CARE
Goal Outcome Evaluation:           To Do:  End of Shift Summary  For vital signs and complete assessments, please see documentation flowsheets.     Pertinent assessments: pt AOX4, trach removed by pulmonologist with dry gauze dressing in place. Patient had thoracentesis performed and 100 ml removed. Patient up with 2 assist with Aaliyah Study.  Reinforced colostomy bag with pressure dressing.     Major Shift Events: uneventful     Treatment Plan: pain control, colostomy management teaching, and physical therapy.      Bedside Nurse: Dior Daniels RN

## 2023-07-26 ENCOUNTER — APPOINTMENT (OUTPATIENT)
Dept: PHYSICAL THERAPY | Facility: CLINIC | Age: 58
End: 2023-07-26
Payer: COMMERCIAL

## 2023-07-26 ENCOUNTER — APPOINTMENT (OUTPATIENT)
Dept: OCCUPATIONAL THERAPY | Facility: CLINIC | Age: 58
End: 2023-07-26
Payer: COMMERCIAL

## 2023-07-26 PROCEDURE — 250N000013 HC RX MED GY IP 250 OP 250 PS 637: Performed by: INTERNAL MEDICINE

## 2023-07-26 PROCEDURE — 120N000001 HC R&B MED SURG/OB

## 2023-07-26 PROCEDURE — G0463 HOSPITAL OUTPT CLINIC VISIT: HCPCS

## 2023-07-26 PROCEDURE — 94640 AIRWAY INHALATION TREATMENT: CPT

## 2023-07-26 PROCEDURE — 97110 THERAPEUTIC EXERCISES: CPT | Mod: GO | Performed by: REHABILITATION PRACTITIONER

## 2023-07-26 PROCEDURE — 999N000157 HC STATISTIC RCP TIME EA 10 MIN

## 2023-07-26 PROCEDURE — 94640 AIRWAY INHALATION TREATMENT: CPT | Mod: 76

## 2023-07-26 PROCEDURE — 97530 THERAPEUTIC ACTIVITIES: CPT | Mod: GP

## 2023-07-26 PROCEDURE — 250N000009 HC RX 250: Performed by: INTERNAL MEDICINE

## 2023-07-26 PROCEDURE — 99231 SBSQ HOSP IP/OBS SF/LOW 25: CPT | Performed by: INTERNAL MEDICINE

## 2023-07-26 PROCEDURE — 97110 THERAPEUTIC EXERCISES: CPT | Mod: GP

## 2023-07-26 PROCEDURE — 250N000013 HC RX MED GY IP 250 OP 250 PS 637: Performed by: HOSPITALIST

## 2023-07-26 RX ORDER — FLUTICASONE FUROATE AND VILANTEROL 200; 25 UG/1; UG/1
1 POWDER RESPIRATORY (INHALATION) DAILY
Status: DISCONTINUED | OUTPATIENT
Start: 2023-07-26 | End: 2023-07-26

## 2023-07-26 RX ADMIN — ACETAMINOPHEN 650 MG: 325 TABLET, FILM COATED ORAL at 20:44

## 2023-07-26 RX ADMIN — CLONAZEPAM 0.5 MG: 0.5 TABLET ORAL at 15:45

## 2023-07-26 RX ADMIN — ATORVASTATIN CALCIUM 20 MG: 20 TABLET, FILM COATED ORAL at 08:36

## 2023-07-26 RX ADMIN — NICOTINE 1 PATCH: 14 PATCH, EXTENDED RELEASE TRANSDERMAL at 08:33

## 2023-07-26 RX ADMIN — IPRATROPIUM BROMIDE 0.5 MG: 0.5 SOLUTION RESPIRATORY (INHALATION) at 12:16

## 2023-07-26 RX ADMIN — BUDESONIDE 0.5 MG: 0.5 INHALANT ORAL at 21:24

## 2023-07-26 RX ADMIN — IPRATROPIUM BROMIDE 0.5 MG: 0.5 SOLUTION RESPIRATORY (INHALATION) at 07:43

## 2023-07-26 RX ADMIN — APIXABAN 5 MG: 5 TABLET, FILM COATED ORAL at 08:36

## 2023-07-26 RX ADMIN — LEVALBUTEROL HYDROCHLORIDE 0.63 MG: 0.63 SOLUTION RESPIRATORY (INHALATION) at 07:43

## 2023-07-26 RX ADMIN — LEVALBUTEROL HYDROCHLORIDE 0.63 MG: 0.63 SOLUTION RESPIRATORY (INHALATION) at 16:00

## 2023-07-26 RX ADMIN — APIXABAN 5 MG: 5 TABLET, FILM COATED ORAL at 20:44

## 2023-07-26 RX ADMIN — LEVOTHYROXINE SODIUM 112 MCG: 0.11 TABLET ORAL at 08:35

## 2023-07-26 RX ADMIN — BUDESONIDE 0.5 MG: 0.5 INHALANT ORAL at 07:43

## 2023-07-26 RX ADMIN — LEVALBUTEROL HYDROCHLORIDE 0.63 MG: 0.63 SOLUTION RESPIRATORY (INHALATION) at 12:16

## 2023-07-26 RX ADMIN — LEVALBUTEROL HYDROCHLORIDE 0.63 MG: 0.63 SOLUTION RESPIRATORY (INHALATION) at 21:21

## 2023-07-26 RX ADMIN — MAGNESIUM OXIDE TAB 400 MG (241.3 MG ELEMENTAL MG) 400 MG: 400 (241.3 MG) TAB at 08:36

## 2023-07-26 RX ADMIN — IPRATROPIUM BROMIDE 0.5 MG: 0.5 SOLUTION RESPIRATORY (INHALATION) at 16:00

## 2023-07-26 RX ADMIN — IPRATROPIUM BROMIDE 0.5 MG: 0.5 SOLUTION RESPIRATORY (INHALATION) at 21:23

## 2023-07-26 RX ADMIN — PAROXETINE HYDROCHLORIDE 20 MG: 20 TABLET, FILM COATED ORAL at 08:36

## 2023-07-26 RX ADMIN — DILTIAZEM HYDROCHLORIDE 240 MG: 120 CAPSULE, COATED, EXTENDED RELEASE ORAL at 08:36

## 2023-07-26 ASSESSMENT — ACTIVITIES OF DAILY LIVING (ADL)
ADLS_ACUITY_SCORE: 33
ADLS_ACUITY_SCORE: 29
ADLS_ACUITY_SCORE: 33
ADLS_ACUITY_SCORE: 29
ADLS_ACUITY_SCORE: 33
ADLS_ACUITY_SCORE: 33
ADLS_ACUITY_SCORE: 29
ADLS_ACUITY_SCORE: 33

## 2023-07-26 NOTE — PROGRESS NOTES
Care Management Follow Up    Length of Stay (days): 35    Expected Discharge Date: 07/26/2023     Concerns to be Addressed:       Patient plan of care discussed at interdisciplinary rounds: Yes    Anticipated Discharge Disposition: Transitional Care, Acute Rehab     Anticipated Discharge Services:    Anticipated Discharge DME:      Patient/family educated on Medicare website which has current facility and service quality ratings: yes  Education Provided on the Discharge Plan:    Patient/Family in Agreement with the Plan:      Referrals Placed by CM/SW: Post Acute Facilities  Private pay costs discussed: Not applicable    Additional Information:  Met with patient this morning and obtained more choices for TCU. Additional referrals sent. Contacted [facility] and [facility] as they declined due to acuity. Confirmed that her trach is completely out and she no longer has tube feedings. They will re-evaluate.  Patient anxious to discharge.    Addendum 1545:  Have left messages for [facility] and [facility] regarding review of referral. Received call from [facility] that they could accept patient pending insurance authorization.  Discussed with patient and daughter Danica, Patient would like daughter to go and tour [facility] before she accepts the room. Provided Danica with [facility] admissions number to set up for tomorrow am. Also set patient up with a you tube video of a virtual tour.   [facility] would have a bed as early as tomorrow if authorization received.  Will follow up with patient tomorrow am.    Bhakti Walters RN BSN OCN  Care Coordinator  [facility]  [phone]

## 2023-07-26 NOTE — PROGRESS NOTES
[facility] Nurse Inpatient Assessment     Consulted for: colostomy due to LBO  Pre-operative diagnosis:         Large bowel obstruction  Post-operative diagnosis        Same as pre-operative diagnosis      Patient History (according to provider note(s):      Acute on chronic hypoxemic and hypercapnic respiratory failure  COPD with acute exacerbation: Has had increasing shortness of breath for a few days secondary to the poor air quality this week.  Has had a cough nonproductive sputum.  Started a Z-Bennett 2 days ago and prednisone 40 mg/day for 1 day then 30 mg/day for 2 days.  Increasing shortness of breath, wheezing, and chest tightness despite using nebs frequently.  PTA on Advair and DuoNebs and albuterol nebs as needed.  Has 2 L O2 at home as needed, but usually does not use.  Tachypneic and tachycardic in the ER.  Hypercapnic initially with VBG showing pH 7.24, PCO2 69, PO2 223, bicarb 29.  Repeat VBG on BiPAP is much improved.  Chest x-ray shows hyperinflated lungs without any new infiltrate.  Slight leukocytosis at 11.8, but has been on prednisone and procalcitonin 0.04.  Received 125 mg IV Solu-Medrol in route    Assessment:      Assessment of new end Colostomy:  Diagnosis Pertinent to Stoma: Bowel Obstruction      Surgery Date: 7/12/2023  Surgeon:Dr Jaspal Rashid        [facility]: [facility]  Pouching system in place on assessment today: [place] one piece, cut to fit, flat and barrier ring   Pouch barrier status: intact  Pouch last changed/wear time: 2-3 days (given peristomal wounds will likely need changes every 2 days currently, can change to every 3 days as areas improve)  Reason for pouch change today: pouch not changed today   Effectiveness of current pouching/ supply plan: Effective  Change made with ostomy management today: No  Supplies: at bedside, ordered Aquacel Ag   Last photo: 7/25/23 7/18/23    Stoma location: LUQ  Stoma size: approximately 1 1/4 x 1 inches   Stoma  "appearance: viable, healthy, pink/red, flush  Mucocutaneous junction:  Intact on 7/25  Peristomal complication(s): from 7/25: full thickness wounds at 6 and 7 o'clock measuring 1.5x2x0.5cm and 0.7x1.2x0.3cm   Output: liquid brown   Output volume emptied during visit: 0  Abdominal assessment: Soft  Surgical site(s): open to air  NG still in place? No  Pain: moderate    Ostomy education assessment:  Participant of teaching session today: patient   Education completed today: Stoma assessment, Pouching system assessment , Ostomy accessory product use , Fluid and electrolyte balance , Importance of hydration, When to seek medical attention, Odor/flatus management , Lifestyle adjustments , and Discharge instructions  Educational materials/methods: Verbal, Written, and Left packet of information at bedside    Learning Comprehension:   Psychosocial assessment: alert   Patient readiness for education today: attentive and active participation  Following today's visit: patient  and nurse is able to demonstrate;         1. How to empty their pouch? Demo provided         2. How to change their pouch? with moderate assist        3. How to read and record intake and output correctly? Demo provided  and Needs additional practice  discussed DME process for ordering pouches   Preparation for discharge completed: Placed prescription recommendations in discharge navigator for MD to sign  Pt support system on discharge: family and 2 daughter   WOC recommend home care? Yes if going home  Face to face time: 15 minutes    Treatment Plan:     LUQ Colostomy pouching plan:   Pouching system: ostomy supplies pouches: [place] FECAL ([id number])   Accessories used: WO ostomy accessories: 2\" Cera Barrier Ring (536896), Powder ([id number]), M9 Drops ([id number]), and Aquacel Ag to full thickness wounds    Frequency of pouch changes: Three times a week  WOC follow up plan: As needed  and Notify WOC if leakage occurs    Orders: Reviewed and " Updated    DATA:     Current support surface: Standard  Low air loss (YONAS pump, Isolibrium, Pulsate, skin guard, etc)  Containment of urine/stool: Colostomy pouch  BMI: Body mass index is 23.43 kg/m .   Active diet order: Orders Placed This Encounter      Combination Diet Regular Diet     Output: I/O last 3 completed shifts:  In: -   Out: 400 [Stool:400]     Labs:   Recent Labs   Lab 07/25/23  0705 07/21/23  0626   HGB 9.0*  --    WBC 5.6  --    A1C  --  5.5       Pressure injury risk assessment:   Sensory Perception: 4-->no impairment  Moisture: 4-->rarely moist  Activity: 2-->chairfast  Mobility: 3-->slightly limited  Nutrition: 2-->probably inadequate  Friction and Shear: 3-->no apparent problem  Lee Score: 18    EDINSON Alexander  [facility] [facility]  Dept. Office Number: [phone]

## 2023-07-26 NOTE — PROGRESS NOTES
"   07/26/23 9010   Appointment Info   Signing Clinician's Name / Credentials (PT) Dianelys Velasco DPT   Therapeutic Procedure/Exercise   Ther. Procedure: strength, endurance, ROM, flexibillity Minutes (61245) 24   Symptoms Noted During/After Treatment fatigue   Treatment Detail/Skilled Intervention Therapist performed bilateral plantar faschia/gastroc/hamstring stretch x 60 sec x2 reps per side. Significant improvement noted in patient's passive ankle DF; now able to demonstrate approx +5 degrees of DF with stretching.  Patient able to demonstrate bilateral active ankle DF (right >left) of approx 5 degrees (from full plantar flexion).  Facilitated SLR from reclined supine x10 reps; fatigues quickly, but able to complete with therapist stabilizing contralateral LE.  Facilitated repeated sit to stands with Delfin Steady from bed with tactile and verbal cueing for good technique (patient tends to use momentum to assist to stand, with strong anterior pelvic tilt, can correct with cueing and maintain for approx 20 sec before needing to sit).   Therapeutic Activity   Therapeutic Activities: dynamic activities to improve functional performance Minutes (17078) 25   Symptoms Noted During/After Treatment Fatigue   Treatment Detail/Skilled Intervention Patient seated in bedside chair upon arrival.  Emotional at beginning of session, however eager to work with therapist.  Patient states her goal is to \"someday be able to go home!\"  Facilitated transfers between bedside chair, bedside commode and bed via delfin steady; therapist required to provide CGA for safety only.   In sitting at EOB, patient donned underwear, pants, bra and shirt with no assist from therapist.  Did require mod A to pull up underwear and pants in standing.  Facilitated transfer between sitting and standing with 2WW and Ax2 for safety (only providing CGA, and walker stabilization).  Patient able to side step x2 in either direction x2 reps (with seated rest " break between each trial).  Patient in chair with all needs within reach at end of session, chair alarm on.   PT Discharge Planning   PT Plan LE stretching prior to mobility, there ex, standing with 2WW, sit to stand from wheelchair, taking a couple steps with wheelchair follow   PT Discharge Recommendation (DC Rec) Acute Rehab Center-Motivated patient will benefit from intensive, interdisciplinary therapy.  Anticipate will be able to tolerate 3 hours of therapy per day   PT Rationale for DC Rec Patient making consistent and significant improvement with physical therapy.  Highly motivated to engage, participate and progress skills.  Patient now requires Ax1 with delfin steady transfers, is able to stand for 20-30 seconds, able to side step in either direction with walker at bedside.  Patient would benefit from ongoing physical therapy in order to increase strength, activity tolerance and independence with safe, functional mobility.  Patient would benefit from intensive skilled therapies in ARU setting; patient is motivated to participate and would be able to tolerate 3 hours of therapy/day.  If patient does not discharge to ARU, would require TCU.   PT Brief overview of current status Ax1 with delfin steady, Ax1-2 standing with walker   Total Session Time   Timed Code Treatment Minutes 49   Total Session Time (sum of timed and untimed services) 49

## 2023-07-26 NOTE — PLAN OF CARE
End of Shift Summary  For vital signs and complete assessments, please see documentation flowsheets.     Pertinent assessments: A&Ox4. VSS on room air, afebrile. Tylenol given x1 for headache. Old trach site dressing CDI. Infrequent nonproductive cough. Denies SOB, LS dim. Ostomy intact with minimal output. Tolerating regular diet. Up Ax2 with delfin steady/lift to bedside commode. Heel boots in place.     Major Shift Events: None  Treatment Plan:Pain management, PT, colostomy management.  Bedside Nurse: Frances Lin RN

## 2023-07-26 NOTE — PROGRESS NOTES
[facility]    Medicine Progress Note - Hospitalist Service    Date of Admission:  6/20/2023    Assessment & Plan     Lara Melendez is a 57 year old female with a history of COPD on prn home oxygen 2-3 L/nc, tobacco dependence, htn/hlp, PAF chronically on apixaban, anxiety, hypothyroidism, psoriasis admitted on 6/20/2023 with SOB and wheezing.     Patient has had a protracted admission here at [facility].  Her symptoms reportedly started a few days prior to admission and she called her pulmonologist who initiated empiric azithromycin and prednisone.  Symptoms continued to worsen so EMS was called and she was brought to the ER.  Her respiratory status continued to decline and she required intubation the day after admission.  She has been treated for community-acquired pneumonia along with COPD exacerbation with steroids and antibiotics.  While on the ventilator, patient had significant bronchospasm and high airway pressures requiring deep sedation and paralysis.  [facility] was actually consulted for possibility of ECMO but she was not deemed an appropriate candidate.  She was also found to have a large pneumothorax on 6/26 with chest tube placed by IR which has subsequently been removed (pulled on 7/10).  She has also had recurrent A-fib with RVR requiring both cardioversion and diltiazem with amiodarone.  She was continued on treatment with steroids, nebs, antibiotics and aggressive diuresis.  Paralytics were able to be weaned off but she did require tracheostomy for long-term ventilator weaning.     Patient had worsening abdominal distention on 7/11.  She had a CT scan done that was concerning for large bowel obstruction.  Colorectal surgery was consulted and she underwent surgical repair and colostomy in the early a.m. of 7/12.     Now medically stable and looking into disposition options.       She does continue to have fairly significant bilateral lower extremity weakness which I suspect is in  part related to severe deconditioning and critical illness myopathy.  Lumbar MRI on 7/21 negative for acute pathology or cord compromise.  Plan to manage conservatively for now unless neurology has other suggestions.     X-ray continues to show some left-sided pleural effusion which is symptomatic for her. Thoracentesis done Monday, 7/24.     Reconsult pulmonology on 7/24.  Tracheostomy tube removed.  Currently awaiting placement at rehab.  Stable for transfer to rehab facility as soon as placement able to be obtained by social work.     #Acute on chronic hypoxic and hypercapnic respiratory failure.   COPD with acute exacerbation.   Left-sided pneumothorax.   PNA with sputum + for klebsiella oxytoca.   Sepsis due to pneumonia.  Lactic acidosis.  Pulmonary edema 2/2 fluid resuscitation for sepsis status post tracheotomy  Left pleural effusion.  Complicated respiratory course-initially intubated due to progressive decline in respiratory function after admission-high airway pressures from severe bronchospasm required deep sedation including paralysis with vec.  She was also treated with 24 hours of continuous albuterol neb.  Lower respiratory track positive for Klebsiella as above along with rhinovirus.  -Patient completed steroid course.  She also completed course of IV antibiotics.  -Chest tube placed by IR on 6/26 for pneumothorax, subsequently removed on 7/10.  -Continue on DuoNebs.    -Appreciate pulmonary consultation.  -IV Lasix given prn for volume overload.   -Will need placement for continued recovery.   -Speech is working with the patient.  Trach down-sized on 7/15 with better comfort and able to talk more.    -Had thoracentesis 7/24.  -Trach tube removed by pulmonology 7/24.     #Ileus.   Large bowel obstruction s/p diverting colostomy.    First tried bowel meds without improvement.  Patient had worsening abdominal distention and pain.  CT abdomen pelvis done on 7/12 showed colonic dilation with abrupt  termination at the mid sigmoid colon with suggestion of pneumatosis.  Colorectal surgery was consulted.  Underwent operative repair early a.m. of 7/12.  Seem to tolerate well.  Appreciate colorectal surgery for postoperative cares.    -Previously requiring tube feedings.    -Now on regular diet with diet supplements of Ensure.     #Paroxysmal AF/SVT:   Pta on diltiazem and apixaban (BB being avoided due to severe COPD). Required DCCV x 2 during this hospitalization.    -Continue apixiban 5 mg twice a day  -amiodarone started during this hospitalization.    -We will trial off amiodarone now that she is so much more stable.    -Restarted her home diltiazem.    -Continue diltiazem extended release 140 mg a day.     #Hypotension:   Did require transient phenylephrine support due to deep sedation. Now resolved.      #CAUTI.  Pseudomonas UTI:   Hines cath placed for critical illness, UA 6/28 with inflammatory cells with large blood. UCx with  K with pseudomonas  -rec'd 2 days of liberty followed by 4 days of cefepime, abx completed 7/6  -Hines removed     #Hyperkalemia.   Hypokalemia: Resolved.  PTA losartan has been on hold.  Blood pressure normal.     #Hypernatremia: Resolved.    Significant rise in sodium up to 160 with tube feed initiation.  Improved with adjustment of water flushes.     #Hypertension   pta on dilt and losartan. dilt back on and being titrated. Losartan discontinued in the setting of hyperkalemia     #Anemia    Drifted down to as low as 6.8-otherwise hemodynamically stable. No clear bleeding noted.  Suspect critical illness/venipuncture mediated  -transfused single unit PRBC 7/8     #Hypothyroidism.  -Continue levothyroxine 112 mcg a day.     #Depression.  -Continue paroxetine 20 mg a day.     #Tobacco use disorder.  -Nicotine patch.          Diet: Combination Diet Regular Diet  Snacks/Supplements Adult: Ensure Enlive; With Meals    DVT Prophylaxis: DOAC  Hines Catheter: Not present  Lines: None      Cardiac Monitoring: None  Code Status: Full Code      Clinically Significant Risk Factors              # Hypoalbuminemia: Lowest albumin = 2.8 g/dL at 7/12/2023  4:43 AM, will monitor as appropriate     # Hypertension: Noted on problem list                 Disposition Plan     Expected Discharge Date: 07/26/2023    Discharge Delays: *Medically Ready for Discharge  Placement - TCU  Destination: other (comment) ([facility])            Abdelrahman Coughlin DO  Hospitalist Service  [facility]  Securely message with [employer] (more info)  Text page via [employer] Paging/Directory   ______________________________________________________________________    Interval History   Feeling better today.  Denies chest pain, shortness of breath, fevers, chills, nausea, vomiting.    Physical Exam   Vital Signs: Temp: 98.6  F (37  C) Temp src: Oral BP: (!) 148/84 Pulse: 86   Resp: 16 SpO2: 94 % O2 Device: None (Room air)    Weight: 132 lbs 4.42 oz    Gen:  NAD, A&Ox3.  Eyes:  PERRL, sclera anicteric.  OP:  MMM, no lesions.  Neck:  Supple.  Dressing over tracheostomy site not removed.  CV:  Regular, no murmurs.  Lung:  CTA b/l, normal effort.  Ab: Ostomy bag present, +BS, soft.  Skin:  Warm, dry to touch.  No rash.  Ext:  No pitting edema LE b/l.      Medical Decision Making       25 MINUTES SPENT BY ME on the date of service doing chart review, history, exam, documentation & further activities per the note.      Data         Imaging results reviewed over the past 24 hrs:   No results found for this or any previous visit (from the past 24 hour(s)).

## 2023-07-26 NOTE — PLAN OF CARE
Goal Outcome Evaluation:    Pertinent assessments: A&O, denied pain sob or nausea.LSC but dim. Adequate output from ostomy, voiding well. Ostomy bag changed by WOC. Trach site dressing intact.     Major Shift Events: Up to chair w/ Aaliyah escalante. Worked with PT/OT.     Treatment Plan: Awaiting TCU placement.

## 2023-07-27 ENCOUNTER — APPOINTMENT (OUTPATIENT)
Dept: OCCUPATIONAL THERAPY | Facility: CLINIC | Age: 58
End: 2023-07-27
Payer: COMMERCIAL

## 2023-07-27 ENCOUNTER — APPOINTMENT (OUTPATIENT)
Dept: PHYSICAL THERAPY | Facility: CLINIC | Age: 58
End: 2023-07-27
Payer: COMMERCIAL

## 2023-07-27 PROCEDURE — 250N000009 HC RX 250: Performed by: INTERNAL MEDICINE

## 2023-07-27 PROCEDURE — 97110 THERAPEUTIC EXERCISES: CPT | Mod: GP

## 2023-07-27 PROCEDURE — 97535 SELF CARE MNGMENT TRAINING: CPT | Mod: GO

## 2023-07-27 PROCEDURE — 250N000013 HC RX MED GY IP 250 OP 250 PS 637: Performed by: INTERNAL MEDICINE

## 2023-07-27 PROCEDURE — 94640 AIRWAY INHALATION TREATMENT: CPT | Mod: 76

## 2023-07-27 PROCEDURE — 120N000001 HC R&B MED SURG/OB

## 2023-07-27 PROCEDURE — 99231 SBSQ HOSP IP/OBS SF/LOW 25: CPT | Performed by: INTERNAL MEDICINE

## 2023-07-27 PROCEDURE — 250N000013 HC RX MED GY IP 250 OP 250 PS 637: Performed by: HOSPITALIST

## 2023-07-27 PROCEDURE — 97530 THERAPEUTIC ACTIVITIES: CPT | Mod: GP

## 2023-07-27 PROCEDURE — 999N000157 HC STATISTIC RCP TIME EA 10 MIN

## 2023-07-27 RX ADMIN — LEVALBUTEROL HYDROCHLORIDE 0.63 MG: 0.63 SOLUTION RESPIRATORY (INHALATION) at 19:43

## 2023-07-27 RX ADMIN — CLONAZEPAM 0.5 MG: 0.5 TABLET ORAL at 10:00

## 2023-07-27 RX ADMIN — BUDESONIDE 0.5 MG: 0.5 INHALANT ORAL at 19:44

## 2023-07-27 RX ADMIN — NICOTINE 1 PATCH: 14 PATCH, EXTENDED RELEASE TRANSDERMAL at 09:19

## 2023-07-27 RX ADMIN — ATORVASTATIN CALCIUM 20 MG: 20 TABLET, FILM COATED ORAL at 09:20

## 2023-07-27 RX ADMIN — IPRATROPIUM BROMIDE 0.5 MG: 0.5 SOLUTION RESPIRATORY (INHALATION) at 19:42

## 2023-07-27 RX ADMIN — IPRATROPIUM BROMIDE 0.5 MG: 0.5 SOLUTION RESPIRATORY (INHALATION) at 08:31

## 2023-07-27 RX ADMIN — APIXABAN 5 MG: 5 TABLET, FILM COATED ORAL at 09:19

## 2023-07-27 RX ADMIN — MAGNESIUM OXIDE TAB 400 MG (241.3 MG ELEMENTAL MG) 400 MG: 400 (241.3 MG) TAB at 09:19

## 2023-07-27 RX ADMIN — DILTIAZEM HYDROCHLORIDE 240 MG: 120 CAPSULE, COATED, EXTENDED RELEASE ORAL at 09:21

## 2023-07-27 RX ADMIN — LEVALBUTEROL HYDROCHLORIDE 0.63 MG: 0.63 SOLUTION RESPIRATORY (INHALATION) at 08:31

## 2023-07-27 RX ADMIN — PAROXETINE HYDROCHLORIDE 20 MG: 20 TABLET, FILM COATED ORAL at 09:20

## 2023-07-27 RX ADMIN — APIXABAN 5 MG: 5 TABLET, FILM COATED ORAL at 20:16

## 2023-07-27 RX ADMIN — LEVALBUTEROL HYDROCHLORIDE 0.63 MG: 0.63 SOLUTION RESPIRATORY (INHALATION) at 16:30

## 2023-07-27 RX ADMIN — ACETAMINOPHEN 650 MG: 325 TABLET, FILM COATED ORAL at 17:11

## 2023-07-27 RX ADMIN — BUDESONIDE 0.5 MG: 0.5 INHALANT ORAL at 08:31

## 2023-07-27 RX ADMIN — LEVALBUTEROL HYDROCHLORIDE 0.63 MG: 0.63 SOLUTION RESPIRATORY (INHALATION) at 11:47

## 2023-07-27 RX ADMIN — LEVOTHYROXINE SODIUM 112 MCG: 0.11 TABLET ORAL at 09:19

## 2023-07-27 RX ADMIN — IPRATROPIUM BROMIDE 0.5 MG: 0.5 SOLUTION RESPIRATORY (INHALATION) at 16:30

## 2023-07-27 RX ADMIN — IPRATROPIUM BROMIDE 0.5 MG: 0.5 SOLUTION RESPIRATORY (INHALATION) at 11:47

## 2023-07-27 ASSESSMENT — ACTIVITIES OF DAILY LIVING (ADL)
ADLS_ACUITY_SCORE: 33
ADLS_ACUITY_SCORE: 33
ADLS_ACUITY_SCORE: 29

## 2023-07-27 NOTE — PLAN OF CARE
Goal Outcome Evaluation:    Pertinent assessments: A&O, VSS on RA. Trach site clean dry intact. Ostomy bag emptied by staff x1. Denies pain/sob or nausea.     Major Shift Events: PRN Clonazepam given for anxiety. Anxious to leave. Preforming PT/OT exercises at bedside in between cares.     Treatment Plan: Pain management, Cont to encourage pt colostomy self management, pending discharge to [facility].

## 2023-07-27 NOTE — PROGRESS NOTES
Care Management Follow Up    Length of Stay (days): 36      Additional Information:  You have successfully submitted the preadmission screening (PAS) to the Senior [employer] Line on:  Created On  7/27/2023 4:03 PM    Your confirmation number is:  [id number]      MARU Villafana, [facility]  Inpatient Care Coordination  [facility]  [phone]

## 2023-07-27 NOTE — PLAN OF CARE
Goal Outcome Evaluation:       End of Shift Summary  For vital signs and complete assessments, please see documentation flowsheets.     Pertinent assessments: Pt A&OX4. Vss and on RA. LS clear and no sob noted. Had a fair appetite. Ostomy with adequate output. Pt worked with PT and strength improving. Trach dressing intact. Had a headache and tylenol given with some relief.    Major Shift Events:None.    Treatment Plan: Pain management, PT/OT,  Colostomy management,pending discharge.

## 2023-07-27 NOTE — PROGRESS NOTES
[facility]    Medicine Progress Note - Hospitalist Service    Date of Admission:  6/20/2023    Assessment & Plan   Lara Melendez is a 57 year old female with a history of COPD on prn home oxygen 2-3 L/nc, tobacco dependence, htn/hlp, PAF chronically on apixaban, anxiety, hypothyroidism, psoriasis admitted on 6/20/2023 with SOB and wheezing.     Patient has had a protracted admission here at [facility].  Her symptoms reportedly started a few days prior to admission and she called her pulmonologist who initiated empiric azithromycin and prednisone.  Symptoms continued to worsen so EMS was called and she was brought to the ER.  Her respiratory status continued to decline and she required intubation the day after admission.  She has been treated for community-acquired pneumonia along with COPD exacerbation with steroids and antibiotics.  While on the ventilator, patient had significant bronchospasm and high airway pressures requiring deep sedation and paralysis.  [facility] was actually consulted for possibility of ECMO but she was not deemed an appropriate candidate.  She was also found to have a large pneumothorax on 6/26 with chest tube placed by IR which has subsequently been removed (pulled on 7/10).  She has also had recurrent A-fib with RVR requiring both cardioversion and diltiazem with amiodarone.  She was continued on treatment with steroids, nebs, antibiotics and aggressive diuresis.  Paralytics were able to be weaned off but she did require tracheostomy for long-term ventilator weaning.     Patient had worsening abdominal distention on 7/11.  She had a CT scan done that was concerning for large bowel obstruction.  Colorectal surgery was consulted and she underwent surgical repair and colostomy in the early a.m. of 7/12.     Now medically stable and looking into disposition options.       She does continue to have fairly significant bilateral lower extremity weakness which I suspect is in part  related to severe deconditioning and critical illness myopathy.  Lumbar MRI on 7/21 negative for acute pathology or cord compromise.  Plan to manage conservatively for now unless neurology has other suggestions.     X-ray continues to show some left-sided pleural effusion which is symptomatic for her. Thoracentesis done Monday, 7/24.     Reconsult pulmonology on 7/24.  Tracheostomy tube removed.  Currently awaiting placement at rehab.  Stable for transfer to rehab facility as soon as placement able to be obtained by social work.     #Acute on chronic hypoxic and hypercapnic respiratory failure.   COPD with acute exacerbation.   Left-sided pneumothorax.   PNA with sputum + for klebsiella oxytoca.   Sepsis due to pneumonia.  Lactic acidosis.  Pulmonary edema 2/2 fluid resuscitation for sepsis status post tracheotomy  Left pleural effusion.  Complicated respiratory course-initially intubated due to progressive decline in respiratory function after admission-high airway pressures from severe bronchospasm required deep sedation including paralysis with vec.  She was also treated with 24 hours of continuous albuterol neb.  Lower respiratory track positive for Klebsiella as above along with rhinovirus.  -Patient completed steroid course.  She also completed course of IV antibiotics.  -Chest tube placed by IR on 6/26 for pneumothorax, subsequently removed on 7/10.  -Continue on DuoNebs.    -Appreciate pulmonary consultation.  -IV Lasix given prn for volume overload.   -Will need placement for continued recovery.   -Speech is working with the patient.  Trach down-sized on 7/15 with better comfort and able to talk more.    -Had thoracentesis 7/24.  -Trach tube removed by pulmonology 7/24.     #Ileus.   Large bowel obstruction s/p diverting colostomy.    First tried bowel meds without improvement.  Patient had worsening abdominal distention and pain.  CT abdomen pelvis done on 7/12 showed colonic dilation with abrupt  termination at the mid sigmoid colon with suggestion of pneumatosis.  Colorectal surgery was consulted.  Underwent operative repair early a.m. of 7/12.  Seem to tolerate well.  Appreciate colorectal surgery for postoperative cares.    -Previously requiring tube feedings.    -Now on regular diet with diet supplements of Ensure.     #Paroxysmal AF/SVT:   Pta on diltiazem and apixaban (BB being avoided due to severe COPD). Required DCCV x 2 during this hospitalization.    -Continue apixiban 5 mg twice a day  -amiodarone started during this hospitalization.    -Off amiodarone now that she is so much more stable.    -Restarted her home diltiazem.    -Continue diltiazem extended release 240 mg a day.     #Hypotension:   Did require transient phenylephrine support due to deep sedation. Now resolved.      #CAUTI.  Pseudomonas UTI:   Hines cath placed for critical illness, UA 6/28 with inflammatory cells with large blood. UCx with  K with pseudomonas  -rec'd 2 days of liberty followed by 4 days of cefepime, abx completed 7/6  -Hines removed     #Hyperkalemia.   Hypokalemia:   Resolved.  PTA losartan has been on hold.  Blood pressure normal.     #Hypernatremia: Resolved.    Significant rise in sodium up to 160 with tube feed initiation.  Improved with adjustment of water flushes.     #Hypertension   pta on dilt and losartan. dilt back on and being titrated. Losartan discontinued in the setting of hyperkalemia     #Anemia    Drifted down to as low as 6.8-otherwise hemodynamically stable. No clear bleeding noted.  Suspect critical illness/venipuncture mediated  -transfused single unit PRBC 7/8  -Recheck hemoglobin intermittently.     #Hypothyroidism.  -Continue levothyroxine 112 mcg a day.     #Depression.  -Continue paroxetine 20 mg a day.     #Tobacco use disorder.  -Nicotine patch.    I did update daughter, Danica, by phone on 7/27.     Diet: Combination Diet Regular Diet  Snacks/Supplements Adult: Ensure Enlive; With Meals     DVT Prophylaxis: DOAC  Hines Catheter: Not present  Lines: None     Cardiac Monitoring: None  Code Status: Full Code      Clinically Significant Risk Factors              # Hypoalbuminemia: Lowest albumin = 2.8 g/dL at 7/12/2023  4:43 AM, will monitor as appropriate     # Hypertension: Noted on problem list                 Disposition Plan      Expected Discharge Date: 07/28/2023    Discharge Delays: *Medically Ready for Discharge  Placement - TCU  Destination: other (comment) ([facility])            Abdelrahman Coughlin DO  Hospitalist Service  [facility]  Securely message with [employer] (more info)  Text page via [facility] Paging/Directory   ______________________________________________________________________    Interval History   Occasional nausea.  Denies chest pain, shortness of breath, fevers, chills, or vomiting.    Physical Exam   Vital Signs: Temp: 98.7  F (37.1  C) Temp src: Oral BP: (!) 153/84 Pulse: 92   Resp: 19 SpO2: 96 % O2 Device: None (Room air)    Weight: 132 lbs 4.42 oz    Gen:  NAD, A&Ox3.  Eyes:  PERRL, sclera anicteric.  OP:  MMM, no lesions.  Neck: Dressing on neck not removed.  CV:  Regular, no murmurs.  Lung:  CTA b/l, normal effort.  Ab: Ostomy bag present, +BS, soft.  Skin:  Warm, dry to touch.  No rash.  Ext:  No pitting edema LE b/l.      Medical Decision Making       25 MINUTES SPENT BY ME on the date of service doing chart review, history, exam, documentation & further activities per the note.      Data         Imaging results reviewed over the past 24 hrs:   No results found for this or any previous visit (from the past 24 hour(s)).

## 2023-07-27 NOTE — PLAN OF CARE
End of Shift Summary  For vital signs and complete assessments, please see documentation flowsheets.     Pertinent assessments: A&Ox4. VSS on RA. Ax1 w/delfin steady to bedside commode. Ostomy with minimal output output. Trach dressing intact. Regular diet.     Major Shift Events: None  Treatment Plan: Pain management, PT/OT,  Colostomy management, pending discharge.  Bedside Nurse: Frances Lin RN

## 2023-07-27 NOTE — PROGRESS NOTES
Care Management Follow Up    Length of Stay (days): 36    Expected Discharge Date: 07/28/2023     Concerns to be Addressed:       Patient plan of care discussed at interdisciplinary rounds: Yes    Anticipated Discharge Disposition: Transitional Care, Acute Rehab     Anticipated Discharge Services:    Anticipated Discharge DME:      Patient/family educated on Medicare website which has current facility and service quality ratings: yes  Education Provided on the Discharge Plan:    Patient/Family in Agreement with the Plan:      Referrals Placed by CM/SW: Post Acute Facilities  Private pay costs discussed: transportation costs    Additional Information:  Daughter touring [facility] this afternoon. Spoke with patient and she voiced acceptance of the bed available at [facility]. [facility] admissions contacted to start insurance authorization. Patient has an [place] BCBS plan so unsure how long authorization will take. They do have bed availability this week.  Will need WC transport on discharge. Will wait to hear form [facility] regarding authorization.    Bhakti Walters RN BSN OCN  Care Coordinator  [facility]  [phone]

## 2023-07-28 ENCOUNTER — APPOINTMENT (OUTPATIENT)
Dept: PHYSICAL THERAPY | Facility: CLINIC | Age: 58
End: 2023-07-28
Payer: COMMERCIAL

## 2023-07-28 ENCOUNTER — APPOINTMENT (OUTPATIENT)
Dept: OCCUPATIONAL THERAPY | Facility: CLINIC | Age: 58
End: 2023-07-28
Payer: COMMERCIAL

## 2023-07-28 ENCOUNTER — LAB REQUISITION (OUTPATIENT)
Dept: LAB | Facility: CLINIC | Age: 58
End: 2023-07-28
Payer: COMMERCIAL

## 2023-07-28 VITALS
DIASTOLIC BLOOD PRESSURE: 67 MMHG | TEMPERATURE: 97.8 F | HEIGHT: 63 IN | HEART RATE: 87 BPM | RESPIRATION RATE: 24 BRPM | WEIGHT: 132.28 LBS | SYSTOLIC BLOOD PRESSURE: 132 MMHG | OXYGEN SATURATION: 96 % | BODY MASS INDEX: 23.44 KG/M2

## 2023-07-28 DIAGNOSIS — Z11.1 ENCOUNTER FOR SCREENING FOR RESPIRATORY TUBERCULOSIS: ICD-10-CM

## 2023-07-28 PROCEDURE — 99239 HOSP IP/OBS DSCHRG MGMT >30: CPT | Performed by: INTERNAL MEDICINE

## 2023-07-28 PROCEDURE — 250N000009 HC RX 250: Performed by: INTERNAL MEDICINE

## 2023-07-28 PROCEDURE — 250N000013 HC RX MED GY IP 250 OP 250 PS 637: Performed by: INTERNAL MEDICINE

## 2023-07-28 PROCEDURE — 999N000157 HC STATISTIC RCP TIME EA 10 MIN

## 2023-07-28 PROCEDURE — 97530 THERAPEUTIC ACTIVITIES: CPT | Mod: GP

## 2023-07-28 PROCEDURE — 250N000013 HC RX MED GY IP 250 OP 250 PS 637: Performed by: HOSPITALIST

## 2023-07-28 PROCEDURE — 94640 AIRWAY INHALATION TREATMENT: CPT

## 2023-07-28 PROCEDURE — 94640 AIRWAY INHALATION TREATMENT: CPT | Mod: 76

## 2023-07-28 PROCEDURE — 97110 THERAPEUTIC EXERCISES: CPT | Mod: GP

## 2023-07-28 PROCEDURE — 97110 THERAPEUTIC EXERCISES: CPT | Mod: GO

## 2023-07-28 RX ORDER — FLUCONAZOLE 150 MG/1
150 TABLET ORAL ONCE
Status: COMPLETED | OUTPATIENT
Start: 2023-07-28 | End: 2023-07-28

## 2023-07-28 RX ORDER — ACETAMINOPHEN 325 MG/1
650 TABLET ORAL EVERY 4 HOURS PRN
DISCHARGE
Start: 2023-07-28 | End: 2024-01-09

## 2023-07-28 RX ORDER — MULTIPLE VITAMINS W/ MINERALS TAB 9MG-400MCG
1 TAB ORAL DAILY
Status: ON HOLD | DISCHARGE
Start: 2023-07-29 | End: 2023-12-28

## 2023-07-28 RX ADMIN — FLUCONAZOLE 150 MG: 150 TABLET ORAL at 17:25

## 2023-07-28 RX ADMIN — DILTIAZEM HYDROCHLORIDE 240 MG: 120 CAPSULE, COATED, EXTENDED RELEASE ORAL at 08:21

## 2023-07-28 RX ADMIN — PAROXETINE HYDROCHLORIDE 20 MG: 20 TABLET, FILM COATED ORAL at 08:21

## 2023-07-28 RX ADMIN — IPRATROPIUM BROMIDE 0.5 MG: 0.5 SOLUTION RESPIRATORY (INHALATION) at 15:27

## 2023-07-28 RX ADMIN — BUDESONIDE 0.5 MG: 0.5 INHALANT ORAL at 07:18

## 2023-07-28 RX ADMIN — ATORVASTATIN CALCIUM 20 MG: 20 TABLET, FILM COATED ORAL at 08:23

## 2023-07-28 RX ADMIN — NICOTINE 1 PATCH: 14 PATCH, EXTENDED RELEASE TRANSDERMAL at 08:25

## 2023-07-28 RX ADMIN — ACETAMINOPHEN 650 MG: 325 TABLET, FILM COATED ORAL at 17:39

## 2023-07-28 RX ADMIN — LEVALBUTEROL HYDROCHLORIDE 0.63 MG: 0.63 SOLUTION RESPIRATORY (INHALATION) at 07:18

## 2023-07-28 RX ADMIN — LEVALBUTEROL HYDROCHLORIDE 0.63 MG: 0.63 SOLUTION RESPIRATORY (INHALATION) at 15:27

## 2023-07-28 RX ADMIN — LEVOTHYROXINE SODIUM 112 MCG: 0.11 TABLET ORAL at 08:21

## 2023-07-28 RX ADMIN — IPRATROPIUM BROMIDE 0.5 MG: 0.5 SOLUTION RESPIRATORY (INHALATION) at 11:31

## 2023-07-28 RX ADMIN — APIXABAN 5 MG: 5 TABLET, FILM COATED ORAL at 08:21

## 2023-07-28 RX ADMIN — MAGNESIUM OXIDE TAB 400 MG (241.3 MG ELEMENTAL MG) 400 MG: 400 (241.3 MG) TAB at 08:21

## 2023-07-28 RX ADMIN — IPRATROPIUM BROMIDE 0.5 MG: 0.5 SOLUTION RESPIRATORY (INHALATION) at 07:15

## 2023-07-28 RX ADMIN — LEVALBUTEROL HYDROCHLORIDE 0.63 MG: 0.63 SOLUTION RESPIRATORY (INHALATION) at 11:31

## 2023-07-28 RX ADMIN — CLONAZEPAM 0.5 MG: 0.5 TABLET ORAL at 10:12

## 2023-07-28 ASSESSMENT — ACTIVITIES OF DAILY LIVING (ADL)
ADLS_ACUITY_SCORE: 29
ADLS_ACUITY_SCORE: 33
ADLS_ACUITY_SCORE: 29

## 2023-07-28 NOTE — PROGRESS NOTES
Care Management Follow Up    Length of Stay (days): 37    Expected Discharge Date: 07/28/2023     Concerns to be Addressed:       Patient plan of care discussed at interdisciplinary rounds: Yes    Anticipated Discharge Disposition: Transitional Care, Acute Rehab     Anticipated Discharge Services:    Anticipated Discharge DME:      Patient/family educated on Medicare website which has current facility and service quality ratings: yes  Education Provided on the Discharge Plan:    Patient/Family in Agreement with the Plan:      Referrals Placed by CM/SW: Post Acute Facilities  Private pay costs discussed: transportation costs      Care Management Discharge Note    Discharge Date: 07/28/2023       Discharge Disposition: Transitional Care, Acute Rehab    Discharge Services:      Discharge DME:      Discharge Transportation: agency    Private pay costs discussed: transportation costs    Does the patient's insurance plan have a 3 day qualifying hospital stay waiver?  No    PAS Confirmation Code: [id number]  Patient/family educated on Medicare website which has current facility and service quality ratings: yes    Education Provided on the Discharge Plan:  yes  Persons Notified of Discharge Plans: Patient and MLM  Patient/Family in Agreement with the Plan:  yes    Handoff Referral Completed: No    Additional Information:  Medically ready for discharge. [facility] is waiting on insurance authorization to accept patient. They can accept anytime today with orders by 1600.   Tentatively set up [facility] transport for [phone] and changed to [phone] in hopes they obtain authorization.  MD to complete orders in anticipation of discharge.  Ostomy supplies in room to be sent with patient per [facility] request.    Addendum 1545:  [facility] has received insurance authorization. Orders faxed. Discharge tonight.    Bhakti Walters RN BSN OCN  Care Coordinator  [facility]  [phone]

## 2023-07-28 NOTE — DISCHARGE SUMMARY
Discharge Note    Patient discharged to TCU via transportation service  accompanied by significant other .  IV: Discontinued  Prescriptions sent with patient to discharge facility .   Belongings reviewed and sent with patient and family.   Home medications returned to patient: NA  Equipment sent with: N/A.   patient and family verbalizes understanding of discharge instructions. Discharge packet sent to discharge facility, given to transport staff.  Additional education completed? Ostomy Care instructions included with printed discharge packet.

## 2023-07-28 NOTE — PLAN OF CARE
Pertinent assessments: A&Ox4. Up Ax1 w/ delfin steady. VSS. RA. Trach site CDI.   Denies pain. Output from ostomy.   Major Shift Events:Uneventful  Treatment Plan:  Pain management, colostomy management, pending discherge to [facility].  Bedside Nurse: Maddie Hyman RN

## 2023-07-28 NOTE — DISCHARGE SUMMARY
[facility]  Hospitalist Discharge Summary      Date of Admission:  6/20/2023  Date of Discharge:  7/28/2023  Discharging Provider: Abdelrahman Coughlin DO  Discharge Service: Hospitalist Service    Discharge Diagnoses   Acute on chronic hypoxic and hypercapnic respiratory failure.  Acute COPD exacerbation.  Left-sided pneumothorax.  Pneumonia due to Klebsiella oxytoca.  Acute sepsis due to pneumonia.  Lactic acidosis.  Acute pulmonary edema secondary to sepsis.  Left pleural effusion.  Ileus.  Large bowel obstruction status post diverting colostomy.  Paroxysmal atrial fibrillation.  Intermittent hypotension.  Pseudomonas urinary tract infection.  Catheter associated UTI.  Hyperkalemia.  Hypokalemia.  Hypomagnesemia.  Hypernatremia.  Hypertension.  Acute anemia of unclear source.  Possibly iatrogenic.  Hypothyroidism.  Depression.  Tobacco use disorder.    Clinically Significant Risk Factors          Follow-ups Needed After Discharge   Follow-up Appointments     Follow Up and recommended labs and tests      Follow up with primary care provider in 1 week.  The following labs/tests   are recommended: CBC and BMP in 1 week.            Discharge Disposition   Discharged to rehabilitation facility  Condition at discharge: Stable    Hospital Course     Expand All Collapse All    [facility]     Medicine Progress Note - Hospitalist Service     Date of Admission:  6/20/2023        Assessment & Plan  Lara Melendez is a 57 year old female with a history of COPD on prn home oxygen 2-3 L/nc, tobacco dependence, htn/hlp, PAF chronically on apixaban, anxiety, hypothyroidism, psoriasis admitted on 6/20/2023 with SOB and wheezing.     Patient has had a protracted admission here at [facility].  Her symptoms reportedly started a few days prior to admission and she called her pulmonologist who initiated empiric azithromycin and prednisone.  Symptoms continued to worsen so EMS was called and  she was brought to the ER.  Her respiratory status continued to decline and she required intubation the day after admission.  She has been treated for community-acquired pneumonia along with COPD exacerbation with steroids and antibiotics.  While on the ventilator, patient had significant bronchospasm and high airway pressures requiring deep sedation and paralysis.  [facility] was actually consulted for possibility of ECMO but she was not deemed an appropriate candidate.  She was also found to have a large pneumothorax on 6/26 with chest tube placed by IR which has subsequently been removed (pulled on 7/10).  She has also had recurrent A-fib with RVR requiring both cardioversion and diltiazem with amiodarone.  She was continued on treatment with steroids, nebs, antibiotics and aggressive diuresis.  Paralytics were able to be weaned off but she did require tracheostomy for long-term ventilator weaning.     Patient had worsening abdominal distention on 7/11.  She had a CT scan done that was concerning for large bowel obstruction.  Colorectal surgery was consulted and she underwent surgical repair and colostomy in the early a.m. of 7/12.     Now medically stable and looking into disposition options.       She does continue to have fairly significant bilateral lower extremity weakness which I suspect is in part related to severe deconditioning and critical illness myopathy.  Lumbar MRI on 7/21 negative for acute pathology or cord compromise.  Plan to manage conservatively for now unless neurology has other suggestions.     X-ray continues to show some left-sided pleural effusion which is symptomatic for her. Thoracentesis done Monday, 7/24.     Reconsulted pulmonology on 7/24.  Tracheostomy tube removed.     Rehab facility placement found on 7/28.  Being transferred to TCU on 7/28.         Consultations This Hospital Stay   PULMONARY IP CONSULT  VASCULAR ACCESS ADULT IP CONSULT  NUTRITION SERVICES ADULT IP CONSULT  PHARMACY  IP CONSULT  THORACIC SURGERY IP CONSULT  INTERVENTIONAL RADIOLOGY ADULT/PEDS IP CONSULT  PHARMACY TO DOSE VANCO  CARDIOLOGY IP CONSULT  PHARMACY TO DOSE VANCO  PHYSICAL THERAPY ADULT IP CONSULT  OCCUPATIONAL THERAPY ADULT IP CONSULT  SOCIAL WORK IP CONSULT  SPEECH LANGUAGE PATH ADULT IP CONSULT  SPEAKING VALVE WITH CUFF DEFLATION IP CONSULT  CARE MANAGEMENT / SOCIAL WORK IP CONSULT  WOUND OSTOMY CONTINENCE NURSE  IP CONSULT  WOUND OSTOMY CONTINENCE NURSE  IP CONSULT  PULMONARY IP CONSULT  PHYSICAL THERAPY ADULT IP CONSULT  NEUROLOGY IP CONSULT  PULMONARY IP CONSULT  PULMONARY IP CONSULT  PHYSICAL THERAPY ADULT IP CONSULT  OCCUPATIONAL THERAPY ADULT IP CONSULT  WOUND OSTOMY CONTINENCE NURSE  IP CONSULT    Code Status   Full Code    Time Spent on this Encounter   I spent 35 minutes with Ms. Melendez and working on discharge on 7/28/2023.       [place]rahman [place] [id number] MEDICAL SURGICAL  [address] [place] [address]  Phone: [phone]  Fax: [phone]  ______________________________________________________________________    Physical Exam   Vital Signs: Temp: 97.8  F (36.6  C) Temp src: Oral BP: 132/67 Pulse: 87   Resp: 24 SpO2: 96 % O2 Device: None (Room air)    Weight: 132 lbs 4.42 oz  Gen:  NAD, A&Ox3.  Eyes:  PERRL, sclera anicteric.  OP:  MMM, no lesions.  Neck: Dressing on neck not removed.  CV:  Regular, no murmurs.  Lung:  CTA b/l, normal effort.  Ab: Ostomy bag present.  +BS, soft.  Skin:  Warm, dry to touch.  No rash.  Ext:  No pitting edema LE b/l.         Primary Care Physician   Sudhir Carroll    Discharge Orders      CBC with platelets     Basic metabolic panel     General info for SNF    Length of Stay Estimate: Short Term Care: Estimated # of Days <30  Condition at Discharge: Improving  Level of care:skilled   Rehabilitation Potential: Excellent  Admission H&P remains valid and up-to-date: Yes  Recent Chemotherapy: N/A  Use Nursing Home Standing Orders: Yes      Mantoux instructions    Give two-step Mantoux (PPD) Per Facility Policy Yes     Follow Up and recommended labs and tests    Follow up with primary care provider in 1 week.  The following labs/tests are recommended: CBC and BMP in 1 week.     Reason for your hospital stay    Acute COPD exacerbation causing acute on chronic hypercapnic and hypoxic respiratory failure.     Activity - Up ad donald     Full Code     Physical Therapy Adult Consult    Evaluate and treat as clinically indicated.    Reason: Deconditioning.     Occupational Therapy Adult Consult    Evaluate and treat as clinically indicated.    Reason: Deconditioning.     Diet    Follow this diet upon discharge: Regular         Discharge Medications   Current Discharge Medication List        START taking these medications    Details   acetaminophen (TYLENOL) 325 MG tablet Take 2 tablets (650 mg) by mouth every 4 hours as needed for mild pain    Associated Diagnoses: Acute and chronic respiratory failure with hypoxia (H)      amiodarone (PACERONE) 200 MG tablet 1 tablet (200 mg) by Oral or Feeding Tube route daily    Associated Diagnoses: Paroxysmal atrial fibrillation with RVR (H)      magnesium oxide (MAG-OX) 400 MG tablet 1 tablet (400 mg) by Oral or Feeding Tube route daily    Associated Diagnoses: Hypomagnesemia      multivitamin w/minerals (THERA-VIT-M) tablet Take 1 tablet by mouth daily    Associated Diagnoses: Acute and chronic respiratory failure with hypoxia (H)      sodium chloride (NEBUSAL) 3 % neb solution Take 3 mLs by nebulization every 3 hours as needed    Associated Diagnoses: COPD exacerbation (H)           CONTINUE these medications which have NOT CHANGED    Details   albuterol (PROAIR HFA/PROVENTIL HFA/VENTOLIN HFA) 108 (90 Base) MCG/ACT inhaler Inhale 2 puffs into the lungs every 4 hours as needed for shortness of breath / dyspnea or wheezing Inhale 1-2 Puffs every 4 hours as needed for Wheezing.  Qty: 18 g, Refills: 0    Comments:  Pharmacy may dispense brand covered by insurance (Proair, or proventil or ventolin or generic albuterol inhaler)  Associated Diagnoses: Chronic obstructive pulmonary disease, unspecified COPD type (H)      albuterol (PROVENTIL) (2.5 MG/3ML) 0.083% neb solution Take 1 vial (2.5 mg) by nebulization every 4 hours as needed for shortness of breath or wheezing  Qty: 30 mL, Refills: 0    Associated Diagnoses: COPD exacerbation (H)      apixaban ANTICOAGULANT (ELIQUIS) 5 MG tablet Take 1 tablet (5 mg) by mouth 2 times daily  Qty: 60 tablet, Refills: 0    Associated Diagnoses: Atrial fibrillation with rapid ventricular response (H)      atorvastatin (LIPITOR) 20 MG tablet Take 20 mg by mouth daily      clonazePAM (KLONOPIN) 0.5 MG tablet Take 0.5 mg by mouth daily      cyclobenzaprine (FLEXERIL) 5 MG tablet Take 1 tablet (5 mg) by mouth every 8 hours as needed for muscle spasms  Qty: 30 tablet, Refills: 0    Associated Diagnoses: Acute low back pain, unspecified back pain laterality, unspecified whether sciatica present      diclofenac (VOLTAREN) 1 % topical gel Apply 2 g topically 4 times daily  Qty: 100 g, Refills: 0    Associated Diagnoses: Acute low back pain, unspecified back pain laterality, unspecified whether sciatica present      diltiazem ER COATED BEADS (CARDIZEM CD/CARTIA XT) 240 MG 24 hr capsule Take 1 capsule (240 mg) by mouth daily  Qty: 30 capsule, Refills: 0    Associated Diagnoses: Atrial fibrillation with rapid ventricular response (H)      fluticasone-salmeterol (ADVAIR-HFA) 230-21 MCG/ACT inhaler Inhale 2 puffs into the lungs 2 times daily  Qty: 12 g, Refills: 0    Associated Diagnoses: COPD exacerbation (H); Acute respiratory failure with hypoxia (H)      ipratropium - albuterol 0.5 mg/2.5 mg/3 mL (DUONEB) 0.5-2.5 (3) MG/3ML neb solution Take 1 vial (3 mLs) by nebulization every 6 hours as needed for shortness of breath / dyspnea or wheezing  Qty: 90 mL, Refills: 1    Associated Diagnoses: COPD  exacerbation (H)      levothyroxine (SYNTHROID/LEVOTHROID) 112 MCG tablet Take 112 mcg by mouth daily      nicotine (NICODERM CQ) 21 MG/24HR 24 hr patch Place 1 patch onto the skin daily  Qty: 28 patch, Refills: 0    Associated Diagnoses: Chronic obstructive pulmonary disease with acute exacerbation (H)      PARoxetine (PAXIL) 20 MG tablet Take 20 mg by mouth daily           STOP taking these medications       losartan (COZAAR) 25 MG tablet Comments:   Reason for Stopping:         predniSONE (DELTASONE) 20 MG tablet Comments:   Reason for Stopping:         tiotropium (SPIRIVA RESPIMAT) 2.5 MCG/ACT inhaler Comments:   Reason for Stopping:             Allergies   Allergies   Allergen Reactions    Sulfa Antibiotics     Doxycycline Other (See Comments)     Develops chest tightness  Intolerance not allergy    Penicillins Rash     Generalized rash  Rash, Generalized

## 2023-07-28 NOTE — PLAN OF CARE
Goal Outcome Evaluation:       End of Shift Summary  For vital signs and complete assessments, please see documentation flowsheets.     Pertinent assessments: Pt A&OX4. Vss and on RA. Ls clear and no sob noted.Tolerated diet and denied nausea. Had a headache and tylenol given with relief. Ostomy with small output. Trach site CDI.    Major Shift Events:None    Treatment Plan:  Pain management, colostomy management, pending discharge to [facility].

## 2023-07-28 NOTE — PROGRESS NOTES
[facility] Nurse Inpatient Assessment     Consulted for: colostomy due to LBO  Pre-operative diagnosis:         Large bowel obstruction  Post-operative diagnosis        Same as pre-operative diagnosis      Patient History (according to provider note(s):      Acute on chronic hypoxemic and hypercapnic respiratory failure  COPD with acute exacerbation: Has had increasing shortness of breath for a few days secondary to the poor air quality this week.  Has had a cough nonproductive sputum.  Started a Z-Bennett 2 days ago and prednisone 40 mg/day for 1 day then 30 mg/day for 2 days.  Increasing shortness of breath, wheezing, and chest tightness despite using nebs frequently.  PTA on Advair and DuoNebs and albuterol nebs as needed.  Has 2 L O2 at home as needed, but usually does not use.  Tachypneic and tachycardic in the ER.  Hypercapnic initially with VBG showing pH 7.24, PCO2 69, PO2 223, bicarb 29.  Repeat VBG on BiPAP is much improved.  Chest x-ray shows hyperinflated lungs without any new infiltrate.  Slight leukocytosis at 11.8, but has been on prednisone and procalcitonin 0.04.  Received 125 mg IV Solu-Medrol in route    Assessment:      Assessment of new end Colostomy:  Diagnosis Pertinent to Stoma: Bowel Obstruction      Surgery Date: 7/12/2023  Surgeon:Dr Jaspal Rashid        [facility]: [facility]  Pouching system in place on assessment today: [place] one piece, cut to fit, flat and barrier ring   Pouch barrier status: intact  Pouch last changed/wear time:3 days  Reason for pouch change today: ostomy education and routine schedule   Effectiveness of current pouching/ supply plan: Leakage occurring 2 days after pouch placement   Change made with ostomy management today: Yes ordered Coloplast easy close due to patient c/o manual dexerity. Concerned about cutting as well. She will get help with that.  Supplies: at bedside, discussed with RN, and discussed with patient   Last photo:  "7/25/23 7/18/23    Stoma location: LUQ  Stoma size: approximately 1 1/4 x 1 inches   Stoma appearance: viable, healthy, pink/red, flush  Mucocutaneous junction:  Intact on 7/25  Peristomal complication(s): full thickness wound at 6 measuring  0.7 x 1.2 x 0.3 cm   Output: liquid brown   Output volume emptied during visit: 50 ml  Abdominal assessment: Soft  Surgical site(s): open to air  NG still in place? No  Pain: moderate    Ostomy education assessment:  Participant of teaching session today: patient   Education completed today: Stoma assessment, Pouching system assessment , Ostomy accessory product use , Pouch emptying demonstration, Fluid and electrolyte balance , Importance of hydration, When to seek medical attention, Odor/flatus management , Lifestyle adjustments , and Discharge instructions  Educational materials/methods: Verbal and Demonstration   Learning Comprehension:   Psychosocial assessment: alert   Patient readiness for education today: attentive and active participation  Following today's visit: patient  and nurse is able to demonstrate;         1. How to empty their pouch? Demo provided         2. How to change their pouch? with moderate assist        3. How to read and record intake and output correctly? Demo provided  and Needs additional practice  discussed DME process for ordering pouches   Preparation for discharge completed: Placed prescription recommendations in discharge navigator for MD to sign  Pt support system on discharge: family and 2 daughter   WOC recommend home care? Yes if going home  Face to face time: 25 minutes    Treatment Plan:     LUQ Colostomy pouching plan:   Pouching system: sensura nam flat CTF 1 pc   Accessories used: WOC ostomy accessories: 2\" Cera Barrier Ring (912696), Powder ([id number]), M9 Drops ([id number]), and Aquacel Ag to full thickness wounds    Frequency of pouch changes: Three times a week  WOC follow up plan: As needed  and Notify WOC if leakage " occurs    Orders: Reviewed and Updated    DATA:     Current support surface: Standard  Low air loss (YONAS pump, Isolibrium, Pulsate, skin guard, etc)  Containment of urine/stool: Colostomy pouch  BMI: Body mass index is 23.43 kg/m .   Active diet order: Orders Placed This Encounter      Combination Diet Regular Diet     Output: I/O last 3 completed shifts:  In: -   Out: 250 [Stool:250]     Labs:   Recent Labs   Lab 07/25/23  0705   HGB 9.0*   WBC 5.6       Pressure injury risk assessment:   Sensory Perception: 4-->no impairment  Moisture: 4-->rarely moist  Activity: 2-->chairfast  Mobility: 3-->slightly limited  Nutrition: 2-->probably inadequate  Friction and Shear: 3-->no apparent problem  Lee Score: 18    Aparna CAMARA RN NOELLE Smiley [facility]  Dept. Office Number: [phone]

## 2023-07-29 ENCOUNTER — PATIENT OUTREACH (OUTPATIENT)
Dept: CARE COORDINATION | Facility: CLINIC | Age: 58
End: 2023-07-29
Payer: COMMERCIAL

## 2023-07-29 LAB
BACTERIA PLR CULT: NO GROWTH
GRAM STAIN RESULT: NORMAL
GRAM STAIN RESULT: NORMAL

## 2023-07-29 NOTE — PROGRESS NOTES
Connected Care Resource Center    Background: Transitional Care Management program identified per system criteria and reviewed by [facility] Resource Center team for possible outreach.    Assessment: Upon chart review, [facility] Team member will not proceed with patient outreach related to this episode of Transitional Care Management program due to reason below:    Patient has discharged to a Memory Care, Long-term Care, Assisted Living or Group Home where patient is receiving on-site support with their daily cares, including support with hospital follow up plan.    Plan: Transitional Care Management episode addressed appropriately per reason noted above.      Jacqueline Pearce MA  Connected Care Resource [place], [facility]    *Connected Care Resource Team does NOT follow patient ongoing. Referrals are identified based on internal discharge reports and the outreach is to ensure patient has an understanding of their discharge instructions.

## 2023-07-29 NOTE — PROGRESS NOTES
Physical Therapy Discharge Summary    Reason for therapy discharge:    Discharged to transitional care facility.    Progress towards therapy goal(s). See goals on Care Plan in [facility] electronic health record for goal details.  Goals not met.  Barriers to achieving goals:   discharge from facility.    Therapy recommendation(s):    Continued therapy is recommended.  Rationale/Recommendations:  intensive therapy for strength, balance, and gait.

## 2023-07-29 NOTE — PLAN OF CARE
Occupational Therapy Discharge Summary    Reason for therapy discharge:    Discharged to transitional care facility.    Progress towards therapy goal(s). See goals on Care Plan in [facility] electronic health record for goal details.  Goals partially met.  Barriers to achieving goals:   discharge from facility.    Therapy recommendation(s):    Continued therapy is recommended.  Rationale/Recommendations:  Pt making consistent progress toward goals, remains limited by impaired activity tolerance and strength. Recommend ongoing skilled OT to improve strength, functional activity tolerance, balance and safety needed for daily tasks. Is highly motivated to return to indep PLOF.       **Pt not seen by writer on this date, note written based on previous treating OT's note and recommendations.

## 2023-07-31 DIAGNOSIS — I48.91 ATRIAL FIBRILLATION (H): Primary | ICD-10-CM

## 2023-07-31 PROCEDURE — P9604 ONE-WAY ALLOW PRORATED TRIP: HCPCS | Mod: ORL | Performed by: NURSE PRACTITIONER

## 2023-07-31 PROCEDURE — 36415 COLL VENOUS BLD VENIPUNCTURE: CPT | Mod: ORL | Performed by: NURSE PRACTITIONER

## 2023-07-31 PROCEDURE — 86481 TB AG RESPONSE T-CELL SUSP: CPT | Mod: ORL | Performed by: NURSE PRACTITIONER

## 2023-07-31 NOTE — PROGRESS NOTES
Message  Received: Today  Alexis Hough MD Weller, Dawn K, RN  Thanks yes should see cardiology JENIFFER and then also the initial consulting cardiologist looks like Dr Yeh

## 2023-08-01 ENCOUNTER — DOCUMENTATION ONLY (OUTPATIENT)
Dept: OTHER | Facility: CLINIC | Age: 58
End: 2023-08-01
Payer: COMMERCIAL

## 2023-08-01 LAB
GAMMA INTERFERON BACKGROUND BLD IA-ACNC: 0.1 IU/ML
M TB IFN-G BLD-IMP: NEGATIVE
M TB IFN-G CD4+ BCKGRND COR BLD-ACNC: 5.3 IU/ML
MITOGEN IGNF BCKGRD COR BLD-ACNC: 0.01 IU/ML
MITOGEN IGNF BCKGRD COR BLD-ACNC: 0.03 IU/ML
QUANTIFERON MITOGEN: 5.4 IU/ML
QUANTIFERON NIL TUBE: 0.1 IU/ML
QUANTIFERON TB1 TUBE: 0.13 IU/ML
QUANTIFERON TB2 TUBE: 0.11

## 2023-08-02 ENCOUNTER — LAB REQUISITION (OUTPATIENT)
Dept: LAB | Facility: CLINIC | Age: 58
End: 2023-08-02
Payer: COMMERCIAL

## 2023-08-02 DIAGNOSIS — Z51.81 ENCOUNTER FOR THERAPEUTIC DRUG LEVEL MONITORING: ICD-10-CM

## 2023-08-03 LAB
ANION GAP SERPL CALCULATED.3IONS-SCNC: 12 MMOL/L (ref 7–15)
BUN SERPL-MCNC: 3.6 MG/DL (ref 8–23)
CALCIUM SERPL-MCNC: 8.9 MG/DL (ref 8.6–10)
CHLORIDE SERPL-SCNC: 100 MMOL/L (ref 98–107)
CREAT SERPL-MCNC: 0.51 MG/DL (ref 0.51–0.95)
DEPRECATED HCO3 PLAS-SCNC: 25 MMOL/L (ref 22–29)
ERYTHROCYTE [DISTWIDTH] IN BLOOD BY AUTOMATED COUNT: 16.3 % (ref 10–15)
GFR SERPL CREATININE-BSD FRML MDRD: >90 ML/MIN/1.73M2
GLUCOSE SERPL-MCNC: 118 MG/DL (ref 70–99)
HCT VFR BLD AUTO: 32.3 % (ref 35–47)
HGB BLD-MCNC: 9.9 G/DL (ref 11.7–15.7)
MCH RBC QN AUTO: 29.9 PG (ref 26.5–33)
MCHC RBC AUTO-ENTMCNC: 30.7 G/DL (ref 31.5–36.5)
MCV RBC AUTO: 98 FL (ref 78–100)
PLATELET # BLD AUTO: 245 10E3/UL (ref 150–450)
POTASSIUM SERPL-SCNC: 3 MMOL/L (ref 3.4–5.3)
RBC # BLD AUTO: 3.31 10E6/UL (ref 3.8–5.2)
SODIUM SERPL-SCNC: 137 MMOL/L (ref 136–145)
WBC # BLD AUTO: 4.7 10E3/UL (ref 4–11)

## 2023-08-03 PROCEDURE — P9603 ONE-WAY ALLOW PRORATED MILES: HCPCS | Mod: ORL | Performed by: NURSE PRACTITIONER

## 2023-08-03 PROCEDURE — 80048 BASIC METABOLIC PNL TOTAL CA: CPT | Mod: ORL | Performed by: NURSE PRACTITIONER

## 2023-08-03 PROCEDURE — 85027 COMPLETE CBC AUTOMATED: CPT | Mod: ORL | Performed by: NURSE PRACTITIONER

## 2023-08-03 PROCEDURE — 36415 COLL VENOUS BLD VENIPUNCTURE: CPT | Mod: ORL | Performed by: NURSE PRACTITIONER

## 2023-08-04 ENCOUNTER — LAB REQUISITION (OUTPATIENT)
Dept: LAB | Facility: CLINIC | Age: 58
End: 2023-08-04
Payer: COMMERCIAL

## 2023-08-04 DIAGNOSIS — E87.6 HYPOKALEMIA: ICD-10-CM

## 2023-08-04 LAB
BACTERIA BRONCH: ABNORMAL
BACTERIA BRONCH: NO GROWTH

## 2023-08-07 LAB — POTASSIUM SERPL-SCNC: 4 MMOL/L (ref 3.4–5.3)

## 2023-08-07 PROCEDURE — 84132 ASSAY OF SERUM POTASSIUM: CPT | Mod: ORL | Performed by: NURSE PRACTITIONER

## 2023-08-07 PROCEDURE — 36415 COLL VENOUS BLD VENIPUNCTURE: CPT | Mod: ORL | Performed by: NURSE PRACTITIONER

## 2023-08-15 ENCOUNTER — HOSPITAL ENCOUNTER (OUTPATIENT)
Dept: WOUND CARE | Facility: CLINIC | Age: 58
Discharge: HOME | End: 2023-08-15
Attending: COLON & RECTAL SURGERY | Admitting: COLON & RECTAL SURGERY
Payer: COMMERCIAL

## 2023-08-15 PROCEDURE — G0463 HOSPITAL OUTPT CLINIC VISIT: HCPCS

## 2023-08-15 NOTE — DISCHARGE INSTRUCTIONS
OUTPATIENT OSTOMY INSTRUCTIONS                                                                        Type of Stoma: Colostomy         Supplier: [employer] [phone]      Equipment:    Product # Brand Description   Pouch [address] [place] 1 pc cut to fit soft convex         Ring [id number] Coloplast Protective Seal 1 1/8 inch   Powder [address] [place] [employer]   Liquid Skin Barrier 3344 3M [place]ilon No Sting   Belt [address] Medium belt         Deodorizer [address] [place] [employer]              Procedure:  Close the bottom of the pouch.  If needed cut the opening of your pouch to the correct size (follow pattern or 1/8 inch larger than stoma) and then remove backings from adhesive surfaces.  Remove the soiled pouch and discard.  Wash skin with water and dry.    Then: Apply powder to open areas only, brush off excess powder. and Apply No-Sting over powdered area.  Then: Apply Ring/Paste: Around stoma.               Then: Apply pouching system to stoma site and hold in place for 2-5 minutes.     ______________________________________________________________________________    Pouch Change:  Every 2 days       WO Nurse Specialist: Harlan Delgado RN CWOCN                  Questions: Baljit [phone] ()      Follow-up Appointment: 3-6 months or as needed. Please call [facility] [phone] () to request an appointment.

## 2023-08-15 NOTE — PROGRESS NOTES
[facility]  OUTPATIENT OSTOMY ASSESSMENT    INTAKE  Type of Stoma: Temporary Colostomy  Anticipated date of takedown: 6-12 months from surgery    Diagnosis Pertinent to Stoma: Bowel Obstruction   Surgery Date: 7/12/23  Surgeon:[facility]    Purpose of this visit: Leaking Problem    Pertinent Information: Patient having frequent leaking issues and skin irritation     Present for Teaching Session: Patient   Present: NA      Current Equipment:    Product # Brand Description   Pouch 48025 Coloplast 1 pc CTF flat         Ring/Paste [address] [place] Adapt     Powder 7906 Michael Adapt                       Pouch Change Frequency: As least daily  Provider of Care: Emptying: self Pouch Change: self    ASSESSMENT  Stoma Size: Oval 1 1/4 x 1  inches  Protrusion:  0.3cm  Stoma Appearance: Pink  Mucocutaneous Juncture: Intact   Peristomal Skin: Denudement-patient cut pouch significantly larger than size of stoma due to leaking, open areas and erythema is in areas left uncovered   Abdominal Assessment:  creasing noted around stoma due to scar tissue      TREATMENT  Applied Stoma Powder and Applied No Sting Skin barrier    INTERVENTIONS  Refitting done, Educated on peristomal skin treatment, Educated on pouch change procedure, and Signed up for Coloplast or Michael support program    INSTRUCTIONS GIVEN  WOC Role, Anatomy, Bathing: Ostomy supplies are waterproof, Clothing, Diet, Fluids: Drink 6-8 glasses of fluids daily , Pouching Products: Showed pouching system , Insurance, and Ordering supplies    PLAN  Change in Supplies: See Outpatient Ostomy Instructions and New Pouching Procedure: See Outpatient Ostomy Instructions      Total Time Spent with Patient: 40 minutes

## 2023-09-02 LAB
ACID FAST STAIN (ARUP): NORMAL

## 2023-10-02 ENCOUNTER — HOSPITAL ENCOUNTER (EMERGENCY)
Facility: CLINIC | Age: 58
Discharge: HOME | End: 2023-10-03
Attending: EMERGENCY MEDICINE | Admitting: EMERGENCY MEDICINE
Payer: COMMERCIAL

## 2023-10-02 DIAGNOSIS — J44.1 COPD WITH ACUTE EXACERBATION (H): ICD-10-CM

## 2023-10-02 DIAGNOSIS — J44.1 COPD EXACERBATION (H): ICD-10-CM

## 2023-10-02 PROCEDURE — 94640 AIRWAY INHALATION TREATMENT: CPT

## 2023-10-02 PROCEDURE — 250N000009 HC RX 250: Performed by: EMERGENCY MEDICINE

## 2023-10-02 PROCEDURE — 99284 EMERGENCY DEPT VISIT MOD MDM: CPT | Mod: 25

## 2023-10-02 RX ORDER — LORAZEPAM 2 MG/ML
0.5 INJECTION INTRAMUSCULAR ONCE
Status: COMPLETED | OUTPATIENT
Start: 2023-10-02 | End: 2023-10-03

## 2023-10-02 RX ORDER — MAGNESIUM SULFATE HEPTAHYDRATE 40 MG/ML
2 INJECTION, SOLUTION INTRAVENOUS ONCE
Status: COMPLETED | OUTPATIENT
Start: 2023-10-02 | End: 2023-10-03

## 2023-10-02 RX ORDER — IPRATROPIUM BROMIDE AND ALBUTEROL SULFATE 2.5; .5 MG/3ML; MG/3ML
6 SOLUTION RESPIRATORY (INHALATION) ONCE
Status: COMPLETED | OUTPATIENT
Start: 2023-10-02 | End: 2023-10-02

## 2023-10-02 RX ORDER — METHYLPREDNISOLONE SODIUM SUCCINATE 125 MG/2ML
125 INJECTION, POWDER, LYOPHILIZED, FOR SOLUTION INTRAMUSCULAR; INTRAVENOUS ONCE
Status: COMPLETED | OUTPATIENT
Start: 2023-10-02 | End: 2023-10-03

## 2023-10-02 RX ADMIN — IPRATROPIUM BROMIDE AND ALBUTEROL SULFATE 6 ML: .5; 3 SOLUTION RESPIRATORY (INHALATION) at 23:56

## 2023-10-03 ENCOUNTER — APPOINTMENT (OUTPATIENT)
Dept: GENERAL RADIOLOGY | Facility: CLINIC | Age: 58
End: 2023-10-03
Attending: EMERGENCY MEDICINE
Payer: COMMERCIAL

## 2023-10-03 VITALS
BODY MASS INDEX: 23.92 KG/M2 | DIASTOLIC BLOOD PRESSURE: 104 MMHG | WEIGHT: 135 LBS | HEART RATE: 95 BPM | OXYGEN SATURATION: 96 % | TEMPERATURE: 97.9 F | SYSTOLIC BLOOD PRESSURE: 152 MMHG | RESPIRATION RATE: 22 BRPM | HEIGHT: 63 IN

## 2023-10-03 LAB
ANION GAP SERPL CALCULATED.3IONS-SCNC: 12 MMOL/L (ref 7–15)
BASO+EOS+MONOS # BLD AUTO: NORMAL 10*3/UL
BASO+EOS+MONOS NFR BLD AUTO: NORMAL %
BASOPHILS # BLD AUTO: 0 10E3/UL (ref 0–0.2)
BASOPHILS NFR BLD AUTO: 0 %
BUN SERPL-MCNC: 9.7 MG/DL (ref 8–23)
CALCIUM SERPL-MCNC: 9.2 MG/DL (ref 8.8–10.2)
CHLORIDE SERPL-SCNC: 103 MMOL/L (ref 98–107)
CREAT SERPL-MCNC: 0.73 MG/DL (ref 0.51–0.95)
DEPRECATED HCO3 PLAS-SCNC: 23 MMOL/L (ref 22–29)
EGFRCR SERPLBLD CKD-EPI 2021: >90 ML/MIN/1.73M2
EOSINOPHIL # BLD AUTO: 0.4 10E3/UL (ref 0–0.7)
EOSINOPHIL NFR BLD AUTO: 5 %
ERYTHROCYTE [DISTWIDTH] IN BLOOD BY AUTOMATED COUNT: 14 % (ref 10–15)
GLUCOSE SERPL-MCNC: 110 MG/DL (ref 70–99)
HCO3 BLDV-SCNC: 25 MMOL/L (ref 21–28)
HCT VFR BLD AUTO: 40.8 % (ref 35–47)
HGB BLD-MCNC: 13.2 G/DL (ref 11.7–15.7)
HOLD SPECIMEN: NORMAL
HOLD SPECIMEN: NORMAL
IMM GRANULOCYTES # BLD: 0 10E3/UL
IMM GRANULOCYTES NFR BLD: 0 %
LACTATE BLD-SCNC: 0.8 MMOL/L
LYMPHOCYTES # BLD AUTO: 2.9 10E3/UL (ref 0.8–5.3)
LYMPHOCYTES NFR BLD AUTO: 40 %
MCH RBC QN AUTO: 30 PG (ref 26.5–33)
MCHC RBC AUTO-ENTMCNC: 32.4 G/DL (ref 31.5–36.5)
MCV RBC AUTO: 93 FL (ref 78–100)
MONOCYTES # BLD AUTO: 0.7 10E3/UL (ref 0–1.3)
MONOCYTES NFR BLD AUTO: 10 %
NEUTROPHILS # BLD AUTO: 3.1 10E3/UL (ref 1.6–8.3)
NEUTROPHILS NFR BLD AUTO: 45 %
NRBC # BLD AUTO: 0 10E3/UL
NRBC BLD AUTO-RTO: 0 /100
PCO2 BLDV: 46 MM HG (ref 40–50)
PH BLDV: 7.34 [PH] (ref 7.32–7.43)
PLATELET # BLD AUTO: 243 10E3/UL (ref 150–450)
PO2 BLDV: 52 MM HG (ref 25–47)
POTASSIUM SERPL-SCNC: 4.2 MMOL/L (ref 3.4–5.3)
RBC # BLD AUTO: 4.4 10E6/UL (ref 3.8–5.2)
SAO2 % BLDV: 84 % (ref 94–100)
SODIUM SERPL-SCNC: 138 MMOL/L (ref 135–145)
WBC # BLD AUTO: 7.1 10E3/UL (ref 4–11)

## 2023-10-03 PROCEDURE — 96366 THER/PROPH/DIAG IV INF ADDON: CPT

## 2023-10-03 PROCEDURE — 96375 TX/PRO/DX INJ NEW DRUG ADDON: CPT

## 2023-10-03 PROCEDURE — 82803 BLOOD GASES ANY COMBINATION: CPT

## 2023-10-03 PROCEDURE — 250N000009 HC RX 250: Performed by: EMERGENCY MEDICINE

## 2023-10-03 PROCEDURE — 80048 BASIC METABOLIC PNL TOTAL CA: CPT | Performed by: EMERGENCY MEDICINE

## 2023-10-03 PROCEDURE — 71046 X-RAY EXAM CHEST 2 VIEWS: CPT

## 2023-10-03 PROCEDURE — 96365 THER/PROPH/DIAG IV INF INIT: CPT | Mod: 59

## 2023-10-03 PROCEDURE — 36415 COLL VENOUS BLD VENIPUNCTURE: CPT | Performed by: EMERGENCY MEDICINE

## 2023-10-03 PROCEDURE — 85025 COMPLETE CBC W/AUTO DIFF WBC: CPT | Performed by: EMERGENCY MEDICINE

## 2023-10-03 PROCEDURE — 250N000011 HC RX IP 250 OP 636: Performed by: EMERGENCY MEDICINE

## 2023-10-03 RX ORDER — PREDNISONE 20 MG/1
TABLET ORAL
Qty: 9 TABLET | Refills: 0 | Status: SHIPPED | OUTPATIENT
Start: 2023-10-03 | End: 2023-10-13

## 2023-10-03 RX ORDER — SODIUM CHLORIDE FOR INHALATION 3 %
3 VIAL, NEBULIZER (ML) INHALATION
Qty: 120 ML | Refills: 0 | Status: SHIPPED | OUTPATIENT
Start: 2023-10-03 | End: 2023-11-02

## 2023-10-03 RX ORDER — IPRATROPIUM BROMIDE AND ALBUTEROL SULFATE 2.5; .5 MG/3ML; MG/3ML
3 SOLUTION RESPIRATORY (INHALATION) ONCE
Status: COMPLETED | OUTPATIENT
Start: 2023-10-03 | End: 2023-10-03

## 2023-10-03 RX ADMIN — MAGNESIUM SULFATE HEPTAHYDRATE 2 G: 2 INJECTION, SOLUTION INTRAVENOUS at 00:02

## 2023-10-03 RX ADMIN — METHYLPREDNISOLONE SODIUM SUCCINATE 125 MG: 125 INJECTION, POWDER, FOR SOLUTION INTRAMUSCULAR; INTRAVENOUS at 00:01

## 2023-10-03 RX ADMIN — IPRATROPIUM BROMIDE AND ALBUTEROL SULFATE 3 ML: .5; 3 SOLUTION RESPIRATORY (INHALATION) at 01:52

## 2023-10-03 RX ADMIN — LORAZEPAM 0.5 MG: 2 INJECTION INTRAMUSCULAR; INTRAVENOUS at 00:00

## 2023-10-03 ASSESSMENT — ACTIVITIES OF DAILY LIVING (ADL)
ADLS_ACUITY_SCORE: 35

## 2023-10-03 NOTE — ED TRIAGE NOTES
Patient presents via EMS from home for evaluation of SOB and COPD exacerbation. Patient reports onset of worsening SOB starting at approximately 1800 this evening. Patient took prescribed inhaler and nebulizer without relief. Patient received additional nebulizer and 125mg solumedrol IV en route. Patient wheezy on arrival. Sats 96% on 1L 02 NC.      Triage Assessment       Row Name 10/02/23 7807       Triage Assessment (Adult)    Airway WDL WDL       Respiratory WDL    Respiratory WDL X  SOB, wheezing       Skin Circulation/Temperature WDL    Skin Circulation/Temperature WDL WDL       Cardiac WDL    Cardiac WDL WDL       Peripheral/Neurovascular WDL    Peripheral Neurovascular WDL WDL       Cognitive/Neuro/Behavioral WDL    Cognitive/Neuro/Behavioral WDL WDL

## 2023-10-03 NOTE — ED PROVIDER NOTES
[place] Provider Note  [facility] Emergency Department  7:12 AM  10/3/2023    Lara Melendez  58 year oldfemale    Chief Complaint   Patient presents with    Shortness of Breath       HPI:    58-year-old female with a history of COPD as well as hypertension paroxysmal atrial fibrillation here with shortness of breath that started to worsen throughout the day today.  Also component of anxiety.  No chest tightness also no sore throat cough fever vomiting or changes in her stooling.  She does have a chronic colostomy.  She was hospitalized for 1 month over the June July time.  Here at [facility] complicated stay requiring intubation complicated by bowel surgery and pneumothorax.  Has not had oral steroids since that time.        Independent Historian:     None     Review of External Notes:       I reviewed the office visit from September 14 which was follow-up and she was doing okay at that time.  As well as a discharge summary which was hospitalized from end of June to the end of July 2023.            ROS: 10 point ROS completed and negative other than mentioned above        Past Medical History:   Diagnosis Date    Anxiety     COPD (chronic obstructive pulmonary disease) - 2L home O2     Diverticulitis of colon     Hypercholesterolemia     Hypertension     Hypothyroidism     Infection due to 2019 novel coronavirus 5/14/2022    Paroxysmal atrial fibrillation     Psoriasis     Pulmonary nodule - left upper lobe      Past Surgical History:   Procedure Laterality Date    CHOLECYSTECTOMY      COLOSTOMY N/A 7/12/2023    Procedure: OPENING DIVERTING COLOSTOMY;  Surgeon: Jaspal Rashid MD;  Location: [facility] OR    INCISION AND DRAINAGE MANDIBLE, COMBINED Left 01/10/2022    Procedure: INCISION AND DRAINAGE, MANDIBLE;  Surgeon: Jn Feliz DDS;  Location: [facility] OR    INCISION AND DRAINAGE MANDIBLE, COMBINED Left 02/04/2022    Procedure: INCISION AND DRAINAGE, MANDIBLE;  Surgeon: Taylor Ortez DDS;  Location: [facility] OR    TOOTH  EXTRACTION      Vocal Cord surgery             Current Outpatient Medications   Medication Instructions    acetaminophen (TYLENOL) 650 mg, Oral, EVERY 4 HOURS PRN    albuterol (PROAIR HFA/PROVENTIL HFA/VENTOLIN HFA) 108 (90 Base) MCG/ACT inhaler 2 puffs, Inhalation, EVERY 4 HOURS PRN, Inhale 1-2 Puffs every 4 hours as needed for Wheezing.    albuterol (PROVENTIL) 2.5 mg, Nebulization, EVERY 4 HOURS PRN    amiodarone (PACERONE) 200 mg, Oral or Feeding Tube, DAILY    apixaban ANTICOAGULANT (ELIQUIS) 5 mg, Oral, 2 TIMES DAILY    atorvastatin (LIPITOR) 20 mg, Oral, DAILY    clonazePAM (KLONOPIN) 0.5 mg, Oral, DAILY    cyclobenzaprine (FLEXERIL) 5 mg, Oral, EVERY 8 HOURS PRN    diclofenac (VOLTAREN) 2 g, Topical, 4 TIMES DAILY    diltiazem ER COATED BEADS (CARDIZEM CD/CARTIA XT) 240 mg, Oral, DAILY    fluticasone-salmeterol (ADVAIR-HFA) 230-21 MCG/ACT inhaler 2 puffs, Inhalation, 2 TIMES DAILY    ipratropium - albuterol 0.5 mg/2.5 mg/3 mL (DUONEB) 0.5-2.5 (3) MG/3ML neb solution 3 mLs, Nebulization, EVERY 6 HOURS PRN    levothyroxine (SYNTHROID/LEVOTHROID) 112 mcg, Oral, DAILY    magnesium oxide (MAG-OX) 400 mg, Oral or Feeding Tube, DAILY    multivitamin w/minerals (THERA-VIT-M) tablet 1 tablet, Oral, DAILY    nicotine (NICODERM CQ) 21 MG/24HR 24 hr patch 1 patch, Transdermal, DAILY    PARoxetine (PAXIL) 20 mg, Oral, DAILY    sodium chloride (NEBUSAL) 3 % neb solution 3 mLs, Nebulization, EVERY 3 HOURS PRN            Allergies   Allergen Reactions    Sulfa Antibiotics     Doxycycline Other (See Comments)     Develops chest tightness  Intolerance not allergy    Penicillins Rash     Generalized rash  Rash, Generalized           Physical Exam  Vitals: BP (!) 158/103   Pulse 95   Temp 97.9  F (36.6  C) (Oral)   Resp 22   SpO2 93%     HEENT:  mmm, no rhinorrhea  Neck: supple, no abnormal swelling  Lungs: Tachypnea, accessory muscle use supraclavicular and subcostal, diffuse expiratory wheezing and prolonged expiratory  phase.  Speaking in short sentences.  CV: rrr, no m/r/g, ppi  Abd: soft, nontender, nondistended, no rebound/masses/guarding/hsm  Ext: no peripheral edema  Skin: warm, dry, well perfused, no rashes/bruising/lesions on exposed skin  Neuro: alert, MAEE, no gross motor or sensory deficits,   Psych: Normal mood, normal affect      Labs and Imaging:    Labs Ordered and Resulted from Time of ED Arrival to Time of ED Departure   BASIC METABOLIC PANEL - Abnormal       Result Value    Sodium 138      Potassium 4.2      Chloride 103      Carbon Dioxide (CO2) 23      Anion Gap 12      Urea Nitrogen 9.7      Creatinine 0.73      GFR Estimate >90      Calcium 9.2      Glucose 110 (*)    ISTAT GASES LACTATE VENOUS POCT - Abnormal    Lactic Acid POCT 0.8      Bicarbonate Venous POCT 25      O2 Sat, Venous POCT 84 (*)     pCO2 Venous POCT 46      pH Venous POCT 7.34      pO2 Venous POCT 52 (*)    CBC WITH PLATELETS AND DIFFERENTIAL    WBC Count 7.1      RBC Count 4.40      Hemoglobin 13.2      Hematocrit 40.8      MCV 93      MCH 30.0      MCHC 32.4      RDW 14.0      Platelet Count 243      % Neutrophils 45      % Lymphocytes 40      % Monocytes 10      Mids % (Monos, Eos, Basos)        % Eosinophils 5      % Basophils 0      % Immature Granulocytes 0      NRBCs per 100 WBC 0      Absolute Neutrophils 3.1      Absolute Lymphocytes 2.9      Absolute Monocytes 0.7      Mids Abs (Monos, Eos, Basos)        Absolute Eosinophils 0.4      Absolute Basophils 0.0      Absolute Immature Granulocytes 0.0      Absolute NRBCs 0.0            XR Chest 2 Views   Final Result   IMPRESSION: Stable cardia mediastinal silhouette. Dense opacity left apex unchanged. No vascular congestion or pleural effusion. Atherosclerotic aorta.                   Independent Interpretation (X-rays, CTs, rhythm strip):    Chest Radiograph without Pneumothorax, Lobar opacity, nor concerning cardiomegaly or pulm edema/pleural effusion        Consultations/Discussion of  Management or Tests:    None     Social Determinants of Health affecting care:    None         ED Medications:   Medications   ipratropium - albuterol 0.5 mg/2.5 mg/3 mL (DUONEB) neb solution 6 mL (6 mLs Nebulization $Given 10/2/23 2356)   magnesium sulfate 2 g in 50 mL sterile water intermittent infusion (0 g Intravenous Stopped 10/3/23 0159)   methylPREDNISolone sodium succinate (solu-MEDROL) injection 125 mg (125 mg Intravenous $Given 10/3/23 0001)   LORazepam (ATIVAN) injection 0.5 mg (0.5 mg Intravenous $Given 10/3/23 0000)   ipratropium - albuterol 0.5 mg/2.5 mg/3 mL (DUONEB) neb solution 3 mL (3 mLs Nebulization $Given 10/3/23 0152)             Medical Decision Makin-year-old female with a history of COPD here with increased work of breathing and obvious bronchospasm.  Improved with bronchodilators, magnesium and glucocorticoids here in the emergency department.  Chest radiograph shows no evidence of lobar pneumonia or pneumothorax.  Will have her do a road test to determine disposition of home to continue steroids and bronchodilators versus admission.    Earlier this summer she did have a complicated hospital stay which was hospitalized for 1 month related to COPD exacerbation and subsequent complications.    Did well on an ambulation trial without desaturation and work of breathing and felt well enough where she was comfortable going home.  We will continue steroids at home, she has her other controller medicines and rescue medicines.  She will return with any new or worsening symptoms.      Diagnosis:    ICD-10-CM    1. COPD with acute exacerbation (H)  J44.1       2. COPD exacerbation (H)  J44.1 sodium chloride (NEBUSAL) 3 % neb solution            Disposition:  home      Eddie Arora MD  [facility]  [facility]       Eddie Arora MD  10/03/23 6964

## 2023-10-17 ENCOUNTER — HOSPITAL ENCOUNTER (OUTPATIENT)
Dept: WOUND CARE | Facility: CLINIC | Age: 58
Discharge: HOME | End: 2023-10-17
Attending: COLON & RECTAL SURGERY | Admitting: COLON & RECTAL SURGERY
Payer: COMMERCIAL

## 2023-10-17 DIAGNOSIS — K94.09 COLOSTOMY PROLAPSE (H): Primary | ICD-10-CM

## 2023-10-17 PROCEDURE — G0463 HOSPITAL OUTPT CLINIC VISIT: HCPCS

## 2023-10-17 NOTE — PROGRESS NOTES
[facility]  OUTPATIENT OSTOMY ASSESSMENT    INTAKE  Type of Stoma: Temporary Colostomy  Anticipated date of takedown: 6-12 months from surgery    Diagnosis Pertinent to Stoma: Bowel Obstruction   Surgery Date: 7/12/23  Surgeon:[facility]: [facility]    Purpose of this visit: Peristomal Complications    Pertinent Information: Stoma has gotten a lot bigger    Present for Teaching Session: Patient   Present: NA      Current Equipment:    Product # Brand Description   Pouch 8958 [place] 1 pc CTF soft convex         Ring/Paste [id number] Coloplast Protective Seal                               Pouch Change Frequency: Every 1-3 days  Provider of Care: Emptying: self Pouch Change: self    ASSESSMENT  Stoma Size: Round 1 7/8 inches  Protrusion:  3cm-now prolapsed   Stoma Appearance: Red, laceration from 6-8 o'clock  Mucocutaneous Juncture: Intact   Peristomal Skin: Intact        TREATMENT  Applied: sugar to stoma with only slight reduction in prolapse     INTERVENTIONS  Refitting done, Educated on pouch change procedure, and Signed up for Coloplast or Michael support program    INSTRUCTIONS GIVEN  WOC Role, Anatomy, Bathing: Ostomy supplies are waterproof, Clothing, Insurance, and Ordering supplies    PLAN  Change in Supplies: See Outpatient Ostomy Instructions      Total Time Spent with Patient: 35 minutes

## 2023-10-17 NOTE — DISCHARGE INSTRUCTIONS
OUTPATIENT OSTOMY INSTRUCTIONS                                                                        Type of Stoma: Colostomy         Supplier:  [facility]       Equipment:    Product # Brand Description   Pouch 14698 Michael 1 pc cut to fit soft convex         Ring 78671 Coloplast Protective Seal         Barrier strips [id number] Coloplast Elastic barrier strips-Y shaped    Belt [address] [place] Medium belt                          Procedure:  Close the bottom of the pouch.  If needed cut the opening of your pouch to the correct size (follow pattern or 1/8 inch larger than stoma) and then remove backings from adhesive surfaces.  Remove the soiled pouch and discard.  Wash skin with water and dry.    Then: Apply Ring: Around stoma.               Then: Apply pouching system to stoma site and hold in place for 2-5 minutes.     ______________________________________________________________________________    Pouch Change:  Every 2 days      WO Nurse Specialist: Harlan Delgado RN CWOCN       Questions: [facility] [phone] ()      Follow-up Appointment: as needed. Please call [facility] [phone] () to request an appointment.

## 2023-12-27 ENCOUNTER — HOSPITAL ENCOUNTER (INPATIENT)
Facility: CLINIC | Age: 58
LOS: 1 days | Discharge: HOME | End: 2023-12-28
Attending: EMERGENCY MEDICINE | Admitting: HOSPITALIST
Payer: COMMERCIAL

## 2023-12-27 DIAGNOSIS — J96.00 ACUTE RESPIRATORY FAILURE, UNSPECIFIED WHETHER WITH HYPOXIA OR HYPERCAPNIA (H): ICD-10-CM

## 2023-12-27 DIAGNOSIS — J44.1 COPD EXACERBATION (H): ICD-10-CM

## 2023-12-27 LAB
BASOPHILS # BLD AUTO: 0 10E3/UL (ref 0–0.2)
BASOPHILS NFR BLD AUTO: 0 %
EOSINOPHIL # BLD AUTO: 0.2 10E3/UL (ref 0–0.7)
EOSINOPHIL NFR BLD AUTO: 2 %
ERYTHROCYTE [DISTWIDTH] IN BLOOD BY AUTOMATED COUNT: 14.6 % (ref 10–15)
HCT VFR BLD AUTO: 42 % (ref 35–47)
HGB BLD-MCNC: 13.8 G/DL (ref 11.7–15.7)
IMM GRANULOCYTES # BLD: 0.1 10E3/UL
IMM GRANULOCYTES NFR BLD: 1 %
LYMPHOCYTES # BLD AUTO: 2.1 10E3/UL (ref 0.8–5.3)
LYMPHOCYTES NFR BLD AUTO: 22 %
MCH RBC QN AUTO: 32.2 PG (ref 26.5–33)
MCHC RBC AUTO-ENTMCNC: 32.9 G/DL (ref 31.5–36.5)
MCV RBC AUTO: 98 FL (ref 78–100)
MONOCYTES # BLD AUTO: 0.9 10E3/UL (ref 0–1.3)
MONOCYTES NFR BLD AUTO: 9 %
NEUTROPHILS # BLD AUTO: 6.2 10E3/UL (ref 1.6–8.3)
NEUTROPHILS NFR BLD AUTO: 66 %
NRBC # BLD AUTO: 0 10E3/UL
NRBC BLD AUTO-RTO: 0 /100
PLATELET # BLD AUTO: 176 10E3/UL (ref 150–450)
RBC # BLD AUTO: 4.29 10E6/UL (ref 3.8–5.2)
WBC # BLD AUTO: 9.4 10E3/UL (ref 4–11)

## 2023-12-27 PROCEDURE — 5A09357 ASSISTANCE WITH RESPIRATORY VENTILATION, LESS THAN 24 CONSECUTIVE HOURS, CONTINUOUS POSITIVE AIRWAY PRESSURE: ICD-10-PCS | Performed by: HOSPITALIST

## 2023-12-27 PROCEDURE — 94640 AIRWAY INHALATION TREATMENT: CPT

## 2023-12-27 PROCEDURE — 93005 ELECTROCARDIOGRAM TRACING: CPT | Mod: 76

## 2023-12-27 PROCEDURE — 99285 EMERGENCY DEPT VISIT HI MDM: CPT | Mod: 25

## 2023-12-27 PROCEDURE — 93005 ELECTROCARDIOGRAM TRACING: CPT

## 2023-12-27 PROCEDURE — 36415 COLL VENOUS BLD VENIPUNCTURE: CPT | Performed by: EMERGENCY MEDICINE

## 2023-12-27 PROCEDURE — 85025 COMPLETE CBC W/AUTO DIFF WBC: CPT | Performed by: EMERGENCY MEDICINE

## 2023-12-27 PROCEDURE — 250N000009 HC RX 250: Performed by: EMERGENCY MEDICINE

## 2023-12-27 PROCEDURE — 84484 ASSAY OF TROPONIN QUANT: CPT | Performed by: EMERGENCY MEDICINE

## 2023-12-27 PROCEDURE — 80048 BASIC METABOLIC PNL TOTAL CA: CPT | Performed by: EMERGENCY MEDICINE

## 2023-12-27 PROCEDURE — 250N000011 HC RX IP 250 OP 636: Performed by: EMERGENCY MEDICINE

## 2023-12-27 PROCEDURE — 96374 THER/PROPH/DIAG INJ IV PUSH: CPT

## 2023-12-27 RX ORDER — METHYLPREDNISOLONE SODIUM SUCCINATE 125 MG/2ML
125 INJECTION, POWDER, LYOPHILIZED, FOR SOLUTION INTRAMUSCULAR; INTRAVENOUS ONCE
Status: COMPLETED | OUTPATIENT
Start: 2023-12-27 | End: 2023-12-27

## 2023-12-27 RX ORDER — IPRATROPIUM BROMIDE AND ALBUTEROL SULFATE 2.5; .5 MG/3ML; MG/3ML
3 SOLUTION RESPIRATORY (INHALATION)
Status: COMPLETED | OUTPATIENT
Start: 2023-12-27 | End: 2023-12-28

## 2023-12-27 RX ORDER — IPRATROPIUM BROMIDE AND ALBUTEROL SULFATE 2.5; .5 MG/3ML; MG/3ML
3 SOLUTION RESPIRATORY (INHALATION)
Status: COMPLETED | OUTPATIENT
Start: 2023-12-27 | End: 2023-12-27

## 2023-12-27 RX ADMIN — IPRATROPIUM BROMIDE AND ALBUTEROL SULFATE 3 ML: .5; 3 SOLUTION RESPIRATORY (INHALATION) at 23:32

## 2023-12-27 RX ADMIN — METHYLPREDNISOLONE SODIUM SUCCINATE 125 MG: 125 INJECTION, POWDER, FOR SOLUTION INTRAMUSCULAR; INTRAVENOUS at 23:19

## 2023-12-27 RX ADMIN — IPRATROPIUM BROMIDE AND ALBUTEROL SULFATE 3 ML: .5; 3 SOLUTION RESPIRATORY (INHALATION) at 23:19

## 2023-12-27 RX ADMIN — IPRATROPIUM BROMIDE AND ALBUTEROL SULFATE 3 ML: .5; 3 SOLUTION RESPIRATORY (INHALATION) at 23:49

## 2023-12-27 ASSESSMENT — ACTIVITIES OF DAILY LIVING (ADL): ADLS_ACUITY_SCORE: 35

## 2023-12-28 ENCOUNTER — APPOINTMENT (OUTPATIENT)
Dept: OCCUPATIONAL THERAPY | Facility: CLINIC | Age: 58
End: 2023-12-28
Attending: HOSPITALIST
Payer: COMMERCIAL

## 2023-12-28 ENCOUNTER — APPOINTMENT (OUTPATIENT)
Dept: GENERAL RADIOLOGY | Facility: CLINIC | Age: 58
End: 2023-12-28
Attending: EMERGENCY MEDICINE
Payer: COMMERCIAL

## 2023-12-28 VITALS
HEIGHT: 64 IN | RESPIRATION RATE: 20 BRPM | SYSTOLIC BLOOD PRESSURE: 123 MMHG | DIASTOLIC BLOOD PRESSURE: 75 MMHG | TEMPERATURE: 97.2 F | WEIGHT: 143.3 LBS | HEART RATE: 91 BPM | BODY MASS INDEX: 24.46 KG/M2 | OXYGEN SATURATION: 92 %

## 2023-12-28 PROBLEM — J96.00 ACUTE RESPIRATORY FAILURE, UNSPECIFIED WHETHER WITH HYPOXIA OR HYPERCAPNIA (H): Status: ACTIVE | Noted: 2023-12-28

## 2023-12-28 LAB
ANION GAP SERPL CALCULATED.3IONS-SCNC: 10 MMOL/L (ref 7–15)
ATRIAL RATE - MUSE: 100 BPM
ATRIAL RATE - MUSE: 107 BPM
BUN SERPL-MCNC: 8.7 MG/DL (ref 8–23)
C PNEUM DNA SPEC QL NAA+PROBE: NOT DETECTED
CALCIUM SERPL-MCNC: 9 MG/DL (ref 8.8–10.2)
CHLORIDE SERPL-SCNC: 102 MMOL/L (ref 98–107)
CREAT SERPL-MCNC: 0.88 MG/DL (ref 0.51–0.95)
DEPRECATED HCO3 PLAS-SCNC: 25 MMOL/L (ref 22–29)
DIASTOLIC BLOOD PRESSURE - MUSE: NORMAL MMHG
DIASTOLIC BLOOD PRESSURE - MUSE: NORMAL MMHG
EGFRCR SERPLBLD CKD-EPI 2021: 75 ML/MIN/1.73M2
FLUAV H1 2009 PAND RNA SPEC QL NAA+PROBE: NOT DETECTED
FLUAV H1 RNA SPEC QL NAA+PROBE: NOT DETECTED
FLUAV H3 RNA SPEC QL NAA+PROBE: NOT DETECTED
FLUAV RNA SPEC QL NAA+PROBE: NEGATIVE
FLUAV RNA SPEC QL NAA+PROBE: NOT DETECTED
FLUBV RNA RESP QL NAA+PROBE: NEGATIVE
FLUBV RNA SPEC QL NAA+PROBE: NOT DETECTED
GLUCOSE BLDC GLUCOMTR-MCNC: 154 MG/DL (ref 70–99)
GLUCOSE BLDC GLUCOMTR-MCNC: 165 MG/DL (ref 70–99)
GLUCOSE SERPL-MCNC: 138 MG/DL (ref 70–99)
HADV DNA SPEC QL NAA+PROBE: NOT DETECTED
HCO3 BLDV-SCNC: 26 MMOL/L (ref 21–28)
HCOV PNL SPEC NAA+PROBE: NOT DETECTED
HMPV RNA SPEC QL NAA+PROBE: NOT DETECTED
HPIV1 RNA SPEC QL NAA+PROBE: NOT DETECTED
HPIV2 RNA SPEC QL NAA+PROBE: NOT DETECTED
HPIV3 RNA SPEC QL NAA+PROBE: NOT DETECTED
HPIV4 RNA SPEC QL NAA+PROBE: NOT DETECTED
INTERPRETATION ECG - MUSE: NORMAL
INTERPRETATION ECG - MUSE: NORMAL
LACTATE BLD-SCNC: 0.8 MMOL/L
M PNEUMO DNA SPEC QL NAA+PROBE: NOT DETECTED
P AXIS - MUSE: 40 DEGREES
P AXIS - MUSE: 86 DEGREES
PCO2 BLDV: 54 MM HG (ref 40–50)
PH BLDV: 7.29 [PH] (ref 7.32–7.43)
PO2 BLDV: 64 MM HG (ref 25–47)
POTASSIUM SERPL-SCNC: 3.8 MMOL/L (ref 3.4–5.3)
PR INTERVAL - MUSE: 148 MS
PR INTERVAL - MUSE: 164 MS
QRS DURATION - MUSE: 76 MS
QRS DURATION - MUSE: 82 MS
QT - MUSE: 382 MS
QT - MUSE: 386 MS
QTC - MUSE: 497 MS
QTC - MUSE: 509 MS
R AXIS - MUSE: 72 DEGREES
R AXIS - MUSE: 73 DEGREES
RSV RNA SPEC NAA+PROBE: NEGATIVE
RSV RNA SPEC QL NAA+PROBE: NOT DETECTED
RSV RNA SPEC QL NAA+PROBE: NOT DETECTED
RV+EV RNA SPEC QL NAA+PROBE: NOT DETECTED
SAO2 % BLDV: 89 % (ref 94–100)
SARS-COV-2 RNA RESP QL NAA+PROBE: NEGATIVE
SODIUM SERPL-SCNC: 137 MMOL/L (ref 135–145)
SYSTOLIC BLOOD PRESSURE - MUSE: NORMAL MMHG
SYSTOLIC BLOOD PRESSURE - MUSE: NORMAL MMHG
T AXIS - MUSE: -38 DEGREES
T AXIS - MUSE: -84 DEGREES
TROPONIN T SERPL HS-MCNC: 15 NG/L
TROPONIN T SERPL HS-MCNC: 16 NG/L
VENTRICULAR RATE- MUSE: 100 BPM
VENTRICULAR RATE- MUSE: 107 BPM

## 2023-12-28 PROCEDURE — 94640 AIRWAY INHALATION TREATMENT: CPT | Mod: 76

## 2023-12-28 PROCEDURE — 250N000011 HC RX IP 250 OP 636: Performed by: HOSPITALIST

## 2023-12-28 PROCEDURE — 999N000147 HC STATISTIC PT IP EVAL DEFER

## 2023-12-28 PROCEDURE — 99418 PROLNG IP/OBS E/M EA 15 MIN: CPT | Performed by: HOSPITALIST

## 2023-12-28 PROCEDURE — 250N000013 HC RX MED GY IP 250 OP 250 PS 637: Performed by: INTERNAL MEDICINE

## 2023-12-28 PROCEDURE — 272N000272 HC CONTINUOUS NEBULIZER MICRO PUMP

## 2023-12-28 PROCEDURE — 87581 M.PNEUMON DNA AMP PROBE: CPT | Performed by: HOSPITALIST

## 2023-12-28 PROCEDURE — 84484 ASSAY OF TROPONIN QUANT: CPT | Performed by: EMERGENCY MEDICINE

## 2023-12-28 PROCEDURE — 99236 HOSP IP/OBS SAME DATE HI 85: CPT | Performed by: HOSPITALIST

## 2023-12-28 PROCEDURE — 97535 SELF CARE MNGMENT TRAINING: CPT | Mod: GO | Performed by: OCCUPATIONAL THERAPIST

## 2023-12-28 PROCEDURE — 200N000001 HC R&B ICU

## 2023-12-28 PROCEDURE — 999N000185 HC STATISTIC TRANSPORT TIME EA 15 MIN

## 2023-12-28 PROCEDURE — 250N000012 HC RX MED GY IP 250 OP 636 PS 637: Performed by: INTERNAL MEDICINE

## 2023-12-28 PROCEDURE — 94640 AIRWAY INHALATION TREATMENT: CPT

## 2023-12-28 PROCEDURE — 82803 BLOOD GASES ANY COMBINATION: CPT

## 2023-12-28 PROCEDURE — 36415 COLL VENOUS BLD VENIPUNCTURE: CPT | Performed by: EMERGENCY MEDICINE

## 2023-12-28 PROCEDURE — 71045 X-RAY EXAM CHEST 1 VIEW: CPT

## 2023-12-28 PROCEDURE — 97165 OT EVAL LOW COMPLEX 30 MIN: CPT | Mod: GO | Performed by: OCCUPATIONAL THERAPIST

## 2023-12-28 PROCEDURE — 250N000013 HC RX MED GY IP 250 OP 250 PS 637: Performed by: HOSPITALIST

## 2023-12-28 PROCEDURE — 250N000011 HC RX IP 250 OP 636: Performed by: EMERGENCY MEDICINE

## 2023-12-28 PROCEDURE — 96375 TX/PRO/DX INJ NEW DRUG ADDON: CPT

## 2023-12-28 PROCEDURE — 94660 CPAP INITIATION&MGMT: CPT

## 2023-12-28 PROCEDURE — 999N000157 HC STATISTIC RCP TIME EA 10 MIN

## 2023-12-28 PROCEDURE — 250N000009 HC RX 250: Performed by: EMERGENCY MEDICINE

## 2023-12-28 PROCEDURE — 250N000009 HC RX 250: Performed by: HOSPITALIST

## 2023-12-28 PROCEDURE — 99207 PR APP CREDIT; MD BILLING SHARED VISIT: CPT | Performed by: INTERNAL MEDICINE

## 2023-12-28 PROCEDURE — 87633 RESP VIRUS 12-25 TARGETS: CPT | Performed by: HOSPITALIST

## 2023-12-28 PROCEDURE — 87637 SARSCOV2&INF A&B&RSV AMP PRB: CPT | Performed by: EMERGENCY MEDICINE

## 2023-12-28 RX ORDER — PAROXETINE 20 MG/1
20 TABLET, FILM COATED ORAL DAILY
Status: DISCONTINUED | OUTPATIENT
Start: 2023-12-28 | End: 2023-12-28 | Stop reason: HOSPADM

## 2023-12-28 RX ORDER — SODIUM CHLORIDE FOR INHALATION 3 %
4 VIAL, NEBULIZER (ML) INHALATION 2 TIMES DAILY
COMMUNITY
Start: 2023-10-19

## 2023-12-28 RX ORDER — ACETAMINOPHEN 325 MG/1
650 TABLET ORAL EVERY 4 HOURS PRN
Status: DISCONTINUED | OUTPATIENT
Start: 2023-12-28 | End: 2023-12-28 | Stop reason: HOSPADM

## 2023-12-28 RX ORDER — NICOTINE POLACRILEX 4 MG
15-30 LOZENGE BUCCAL
Status: DISCONTINUED | OUTPATIENT
Start: 2023-12-28 | End: 2023-12-28 | Stop reason: HOSPADM

## 2023-12-28 RX ORDER — LORAZEPAM 2 MG/ML
0.5 INJECTION INTRAMUSCULAR ONCE
Status: COMPLETED | OUTPATIENT
Start: 2023-12-28 | End: 2023-12-28

## 2023-12-28 RX ORDER — NICOTINE 21 MG/24HR
1 PATCH, TRANSDERMAL 24 HOURS TRANSDERMAL DAILY
Status: DISCONTINUED | OUTPATIENT
Start: 2023-12-28 | End: 2023-12-28

## 2023-12-28 RX ORDER — LOSARTAN POTASSIUM 25 MG/1
1 TABLET ORAL DAILY
Status: ON HOLD | COMMUNITY
Start: 2023-08-31 | End: 2024-02-14

## 2023-12-28 RX ORDER — FLUTICASONE FUROATE AND VILANTEROL 200; 25 UG/1; UG/1
1 POWDER RESPIRATORY (INHALATION) DAILY
Status: DISCONTINUED | OUTPATIENT
Start: 2023-12-28 | End: 2023-12-28 | Stop reason: HOSPADM

## 2023-12-28 RX ORDER — LEVALBUTEROL INHALATION SOLUTION 0.63 MG/3ML
1 SOLUTION RESPIRATORY (INHALATION) EVERY 4 HOURS PRN
COMMUNITY
Start: 2023-10-19

## 2023-12-28 RX ORDER — ALBUTEROL SULFATE 0.83 MG/ML
2.5 SOLUTION RESPIRATORY (INHALATION)
Status: DISCONTINUED | OUTPATIENT
Start: 2023-12-28 | End: 2023-12-28 | Stop reason: HOSPADM

## 2023-12-28 RX ORDER — PREDNISONE 20 MG/1
40 TABLET ORAL DAILY
Qty: 6 TABLET | Refills: 0 | Status: SHIPPED | OUTPATIENT
Start: 2023-12-29 | End: 2024-01-01

## 2023-12-28 RX ORDER — ATORVASTATIN CALCIUM 20 MG/1
20 TABLET, FILM COATED ORAL DAILY
Status: DISCONTINUED | OUTPATIENT
Start: 2023-12-28 | End: 2023-12-28 | Stop reason: HOSPADM

## 2023-12-28 RX ORDER — PREDNISONE 20 MG/1
40 TABLET ORAL DAILY
Status: DISCONTINUED | OUTPATIENT
Start: 2023-12-28 | End: 2023-12-28 | Stop reason: HOSPADM

## 2023-12-28 RX ORDER — DEXTROSE MONOHYDRATE 25 G/50ML
25-50 INJECTION, SOLUTION INTRAVENOUS
Status: DISCONTINUED | OUTPATIENT
Start: 2023-12-28 | End: 2023-12-28 | Stop reason: HOSPADM

## 2023-12-28 RX ORDER — ONDANSETRON 2 MG/ML
4 INJECTION INTRAMUSCULAR; INTRAVENOUS EVERY 6 HOURS PRN
Status: DISCONTINUED | OUTPATIENT
Start: 2023-12-28 | End: 2023-12-28 | Stop reason: HOSPADM

## 2023-12-28 RX ORDER — DILTIAZEM HYDROCHLORIDE 240 MG/1
240 CAPSULE, COATED, EXTENDED RELEASE ORAL DAILY
Status: DISCONTINUED | OUTPATIENT
Start: 2023-12-28 | End: 2023-12-28

## 2023-12-28 RX ORDER — METHYLPREDNISOLONE SODIUM SUCCINATE 125 MG/2ML
60 INJECTION, POWDER, LYOPHILIZED, FOR SOLUTION INTRAMUSCULAR; INTRAVENOUS EVERY 12 HOURS
Status: DISCONTINUED | OUTPATIENT
Start: 2023-12-28 | End: 2023-12-28

## 2023-12-28 RX ORDER — LEVOTHYROXINE SODIUM 112 UG/1
112 TABLET ORAL DAILY
Status: DISCONTINUED | OUTPATIENT
Start: 2023-12-28 | End: 2023-12-28 | Stop reason: HOSPADM

## 2023-12-28 RX ORDER — ONDANSETRON 4 MG/1
4 TABLET, ORALLY DISINTEGRATING ORAL EVERY 6 HOURS PRN
Status: DISCONTINUED | OUTPATIENT
Start: 2023-12-28 | End: 2023-12-28 | Stop reason: HOSPADM

## 2023-12-28 RX ORDER — CYCLOBENZAPRINE HCL 5 MG
5 TABLET ORAL EVERY 8 HOURS PRN
Status: DISCONTINUED | OUTPATIENT
Start: 2023-12-28 | End: 2023-12-28 | Stop reason: HOSPADM

## 2023-12-28 RX ORDER — CLONAZEPAM 0.5 MG/1
0.5 TABLET ORAL DAILY
Status: DISCONTINUED | OUTPATIENT
Start: 2023-12-28 | End: 2023-12-28 | Stop reason: HOSPADM

## 2023-12-28 RX ORDER — FLUTICASONE FUROATE AND VILANTEROL 200; 25 UG/1; UG/1
1 POWDER RESPIRATORY (INHALATION) DAILY
COMMUNITY
Start: 2023-09-27

## 2023-12-28 RX ORDER — AMIODARONE HYDROCHLORIDE 200 MG/1
200 TABLET ORAL DAILY
Status: DISCONTINUED | OUTPATIENT
Start: 2023-12-28 | End: 2023-12-28 | Stop reason: HOSPADM

## 2023-12-28 RX ORDER — IPRATROPIUM BROMIDE AND ALBUTEROL SULFATE 2.5; .5 MG/3ML; MG/3ML
3 SOLUTION RESPIRATORY (INHALATION) EVERY 6 HOURS PRN
Status: DISCONTINUED | OUTPATIENT
Start: 2023-12-28 | End: 2023-12-28 | Stop reason: HOSPADM

## 2023-12-28 RX ORDER — MAGNESIUM OXIDE 400 MG/1
400 TABLET ORAL DAILY
Status: DISCONTINUED | OUTPATIENT
Start: 2023-12-28 | End: 2023-12-28

## 2023-12-28 RX ADMIN — CLONAZEPAM 0.5 MG: 0.5 TABLET ORAL at 10:36

## 2023-12-28 RX ADMIN — LEVOTHYROXINE SODIUM 112 MCG: 112 TABLET ORAL at 10:36

## 2023-12-28 RX ADMIN — IPRATROPIUM BROMIDE AND ALBUTEROL SULFATE 3 ML: .5; 3 SOLUTION RESPIRATORY (INHALATION) at 07:32

## 2023-12-28 RX ADMIN — APIXABAN 5 MG: 5 TABLET, FILM COATED ORAL at 10:36

## 2023-12-28 RX ADMIN — FLUTICASONE FUROATE AND VILANTEROL TRIFENATATE 1 PUFF: 200; 25 POWDER RESPIRATORY (INHALATION) at 12:09

## 2023-12-28 RX ADMIN — METHYLPREDNISOLONE SODIUM SUCCINATE 62.5 MG: 125 INJECTION, POWDER, FOR SOLUTION INTRAMUSCULAR; INTRAVENOUS at 10:35

## 2023-12-28 RX ADMIN — ACETAMINOPHEN 650 MG: 325 TABLET, FILM COATED ORAL at 12:11

## 2023-12-28 RX ADMIN — LOSARTAN POTASSIUM 25 MG: 25 TABLET, FILM COATED ORAL at 12:12

## 2023-12-28 RX ADMIN — IPRATROPIUM BROMIDE AND ALBUTEROL SULFATE 3 ML: .5; 3 SOLUTION RESPIRATORY (INHALATION) at 00:11

## 2023-12-28 RX ADMIN — ALBUTEROL SULFATE 2.5 MG: 2.5 SOLUTION RESPIRATORY (INHALATION) at 11:22

## 2023-12-28 RX ADMIN — LORAZEPAM 0.5 MG: 2 INJECTION INTRAMUSCULAR; INTRAVENOUS at 00:36

## 2023-12-28 RX ADMIN — IPRATROPIUM BROMIDE AND ALBUTEROL SULFATE 3 ML: .5; 3 SOLUTION RESPIRATORY (INHALATION) at 00:12

## 2023-12-28 RX ADMIN — AMIODARONE HYDROCHLORIDE 200 MG: 200 TABLET ORAL at 10:36

## 2023-12-28 RX ADMIN — ACETAMINOPHEN 650 MG: 325 TABLET, FILM COATED ORAL at 04:42

## 2023-12-28 RX ADMIN — PREDNISONE 40 MG: 20 TABLET ORAL at 13:15

## 2023-12-28 RX ADMIN — PAROXETINE HYDROCHLORIDE 20 MG: 20 TABLET, FILM COATED ORAL at 10:36

## 2023-12-28 RX ADMIN — ATORVASTATIN CALCIUM 20 MG: 20 TABLET, FILM COATED ORAL at 10:36

## 2023-12-28 ASSESSMENT — ACTIVITIES OF DAILY LIVING (ADL)
ADLS_ACUITY_SCORE: 35
ADLS_ACUITY_SCORE: 22
ADLS_ACUITY_SCORE: 35
ADLS_ACUITY_SCORE: 22

## 2023-12-28 NOTE — PROGRESS NOTES
A BiPAP of  12/6 @ 25% was applied to the pt via the mask for an increase in WOB and/or SOB.  The bridge of the nose looks good and remains intact. Pt is tolerating it well. Will continue to monitor and assess the pt's current respiratory status and needs.      Dior Barba, RT

## 2023-12-28 NOTE — PLAN OF CARE
Patient has met all discharge criteria. Ambulating independently without excessive dyspnea. Tolerating regular diet. Reviewed discharge summary and removed PIV. Pt's  arrived to take her home., left unit via wheelchair.

## 2023-12-28 NOTE — PROGRESS NOTES
[facility]  Hospitalist Progress Note  Aftab Malave MD 12/28/23    Reason for Stay (Diagnosis): COPD exacerbation         Assessment and Plan:      Summary of Stay: Lara Melendez is a 58 year old female with past medical history including COPD with 1 hospitalization this past summer, hypertension, anxiety, A-fib on apixaban admitted on 12/27/2023 with cough, shortness of breath and expiratory wheezing compatible with COPD.  She does report 1 sick contact to a likely viral illness.    Here in the ER she was mildly tachycardic but not hypoxic.  She was hypertensive with expiratory wheezing and felt to be in some respiratory distress with increased work of breathing.  Workup here was otherwise fairly unremarkable with grossly normal BMP, negative troponin, normal lactic acid and negative influenza, RSV and COVID-19 testing.  CBC was likewise grossly normal.  Chest x-ray was negative for infiltrate.  EKG showed sinus tachycardia with nonspecific ST changes.  She has denied chest pain or pressure throughout and serial troponins were flat and barely detectable.    She was initially admitted to the ICU for IV steroids and ongoing nebs with BiPAP support but shortly after arrival BiPAP was removed and she now is breathing comfortably on room air.  She does have some ongoing end expiratory wheezes compatible with lingering bronchospasm.    She has been transition to oral prednisone.  She does have a nebulizer machine at home as well as a rescue inhaler and Breo Ellipta.    Problem List/Assessment and Plan:   Acute respiratory hypercapnic failure due to apparent COPD exacerbation: As above.  Improved quite quickly with IV steroids, nebs and BiPAP support.  --Transition to oral prednisone for 3 days more  --Continue home Breo Ellipta, as needed Xopenex.  Plan to discharge with some DuoNebs for use in her nebulizer as well.  --Suspect viral syndrome as the likely cause/trigger here.    Other active  "chronic health problems.    Hypercholesterolemia.  Atorvastatin.  Essential hypertension.  On diltiazem, losartan.  Hypothyroidism.  On Synthroid.  Anxiety.  On clonazepam and Paxil.  History of PAF.  DOAC anticoagulation, rate control with amiodarone and diltiazem.    DVT Prophylaxis: DOAC  Code Status: Full Code  Discharge Dispo/Date: Home either later today or tomorrow    Clinically Significant Risk Factors Present on Admission               # Drug Induced Coagulation Defect: home medication list includes an anticoagulant medication    # Hypertension: Noted on problem list                       Interval History (Subjective):      Patient admitted last night, has had remarkable improvement already.  Off BiPAP and actually breathing comfortably on room air this morning when I saw her    Moving good air overall with ongoing end expiratory wheezes but normal work of breathing    Changing from IV steroids to oral prednisone    Continuing nebs, restarting home Breo Ellipta/formulary substitution    Home either later today or tomorrow                  Physical Exam:      Last Vital Signs:  /76   Pulse 91   Temp 97.2  F (36.2  C) (Temporal)   Resp 20   Ht 1.626 m (5' 4\")   Wt 65 kg (143 lb 4.8 oz)   SpO2 97%   BMI 24.60 kg/m        Intake/Output Summary (Last 24 hours) at 12/28/2023 1214  Last data filed at 12/28/2023 1100  Gross per 24 hour   Intake --   Output 650 ml   Net -650 ml       General: Alert, awake, no acute distress.  HEENT: NC/AT, eyes anicteric, external occular movements intact, face symmetric.    Cardiac: RRR, S1, S2.  No murmurs appreciated.  Pulmonary: Normal chest rise, normal work of breathing.  End expiratory wheezing noted.  Abdomen: soft, non-tender, non-distended.  Bowel Sounds Present.  No guarding.  Extremities: no deformities.  Warm, well perfused.  Skin: no rashes or lesions noted.  Warm and Dry.  Neuro: No focal deficits noted.  Speech clear.  Coordination and strength grossly " normal.  Psych: Appropriate affect.         Medications:      All current medications were reviewed with changes reflected in problem list.         Data:      All new lab and imaging data was reviewed.   Labs:  Recent Labs   Lab 12/28/23  0825 12/28/23  0432 12/27/23  2348   NA  --   --  137   POTASSIUM  --   --  3.8   CHLORIDE  --   --  102   CO2  --   --  25   ANIONGAP  --   --  10   *   < > 138*   BUN  --   --  8.7   CR  --   --  0.88   GFRESTIMATED  --   --  75   EDIN  --   --  9.0    < > = values in this interval not displayed.     Recent Labs   Lab 12/27/23 2348   WBC 9.4   HGB 13.8   HCT 42.0   MCV 98         Imaging:   Results for orders placed or performed during the hospital encounter of 12/27/23   XR Chest Port 1 View    Narrative    EXAM: XR CHEST PORT 1 VIEW  LOCATION: [facility]  DATE: 12/28/2023    INDICATION: Shortness of breath.  COMPARISON: Chest x-ray 2 views 10/03/2023 at 0056 hours.      Impression    IMPRESSION: Calcific scarring left apex. Both lungs are otherwise clear. No adenopathy or effusion. Normal cardiac size and pulmonary vascularity. Atherosclerotic changes involving the thoracic arch. Mild degenerative changes both shoulders and the   spine. Monitoring leads have been placed overlying the chest. Otherwise, no change.         Aftab Malave MD     I've spent 50 minutes in chart review, ordering medications and tests, obtaining additional history as needed, evaluating the patient and in documentation for this encounter.

## 2023-12-28 NOTE — ED TRIAGE NOTES
"BIBA for COPD exacerbation. Had a similar episode in Oct, but was not admitted. Does report being on a vent in June 2021 for a COPD exacerbation. EMS gave 1 neb and pt did 3 at home. \"Helped a bit\" but still not able to fully catch breath.      Triage Assessment (Adult)       Row Name 12/27/23 2259 12/27/23 8532       Triage Assessment    Airway WDL WDL WDL       Respiratory WDL    Respiratory WDL X;rhythm/pattern X  COPD exacerbation. 4L NC arriving in ED. 3 nebs at home, 1 neb with EMS. \"Helped a bit\" per pt.    Rhythm/Pattern, Respiratory pursed lip breathing;shortness of breath;tachypneic;labored --       Skin Circulation/Temperature WDL    Skin Circulation/Temperature WDL WDL WDL       Cardiac WDL    Cardiac WDL -- WDL    Cardiac Rhythm Atrial fibrillation --       Peripheral/Neurovascular WDL    Peripheral Neurovascular WDL WDL WDL       Cognitive/Neuro/Behavioral WDL    Cognitive/Neuro/Behavioral WDL WDL WDL       [place] Coma Scale    Best Eye Response 4-->(E4) spontaneous --    Best Motor Response 6-->(M6) obeys commands --    Best Verbal Response 5-->(V5) oriented --    [place] Coma Scale Score 15 --                    "

## 2023-12-28 NOTE — PROGRESS NOTES
PT orders received and reviewed. Per OT, pt able to tolerate >5 minutes of activity with maintaining oxygen saturation >90%. Pt IND with mobility, no concerns re: home access and pt appears to be near baseline. No acute IP PT needs identified. Will defer discharge recs to OT and medical team and complete orders at this time.

## 2023-12-28 NOTE — PHARMACY-ADMISSION MEDICATION HISTORY
Pharmacist Admission Medication History    Admission medication history is complete. The information provided in this note is only as accurate as the sources available at the time of the update.    Information Source(s): Patient and CareEverywhere/SureScripts via in-person    Pertinent Information: Not taking Diltiazem as patient visited provider and questioned as to why on Amiodarone and Diltiazem so patient stopped taking. Clonazepam written as TID PRN but patient takes 1 tablet daily.  Recent fill history for Prednisone taper however patient is out and last took in November.    Changes made to PTA medication list:  Added: Breo, Xopenex, Losartan, NaCl neb  Deleted: Flexeril, Voltaren, Diltiazem, Advair, Magnesium, Multivitamin, Nicotine patch,  Changed: None    Medication Affordability:  Not including over the counter (OTC) medications, was there a time in the past 3 months when you did not take your medications as prescribed because of cost?: Yes    Allergies reviewed with patient and updates made in EHR: yes    Medication History Completed By: Adriane Greco [facility] 12/28/2023 9:26 AM    Prior to Admission medications    Medication Sig Last Dose Taking? Auth Provider Long Term End Date   acetaminophen (TYLENOL) 325 MG tablet Take 2 tablets (650 mg) by mouth every 4 hours as needed for mild pain Unknown at PRN Yes Abdelrahman Coughlin DO     albuterol (PROAIR HFA/PROVENTIL HFA/VENTOLIN HFA) 108 (90 Base) MCG/ACT inhaler Inhale 2 puffs into the lungs every 4 hours as needed for shortness of breath / dyspnea or wheezing Inhale 1-2 Puffs every 4 hours as needed for Wheezing. Unknown at PRN Yes Mark Olsen DO Yes    albuterol (PROVENTIL) (2.5 MG/3ML) 0.083% neb solution Take 1 vial (2.5 mg) by nebulization every 4 hours as needed for shortness of breath or wheezing Unknown at PRN Yes Tommie Ren MD Yes 12/28/23   amiodarone (PACERONE) 200 MG tablet 1 tablet (200 mg) by Oral or Feeding Tube route daily 12/27/2023  Yes Tommie Ren MD Yes    apixaban ANTICOAGULANT (ELIQUIS) 5 MG tablet Take 1 tablet (5 mg) by mouth 2 times daily 12/27/2023 at x1 AM Yes Luis Alberto Valentin MD     atorvastatin (LIPITOR) 20 MG tablet Take 20 mg by mouth daily 12/27/2023 Yes Reported, Patient Yes    clonazePAM (KLONOPIN) 0.5 MG tablet Take 0.5 mg by mouth daily 12/27/2023 Yes Unknown, Entered By History Yes    fluticasone-vilanterol (BREO ELLIPTA) 200-25 MCG/ACT inhaler Inhale 1 Dose into the lungs daily 12/27/2023 Yes Unknown, Entered By History     ipratropium - albuterol 0.5 mg/2.5 mg/3 mL (DUONEB) 0.5-2.5 (3) MG/3ML neb solution Take 1 vial (3 mLs) by nebulization every 6 hours as needed for shortness of breath / dyspnea or wheezing Unknown at PRN Yes Morales Alva MD Yes    levalbuterol (XOPENEX) 0.63 MG/3ML neb solution Inhale 0.63 mg into the lungs every 4 hours as needed Unknown at PRN Yes Unknown, Entered By History     levothyroxine (SYNTHROID/LEVOTHROID) 112 MCG tablet Take 112 mcg by mouth daily 12/27/2023 Yes Reported, Patient Yes    losartan (COZAAR) 25 MG tablet Take 1 tablet by mouth daily 12/27/2023 Yes Unknown, Entered By History No    PARoxetine (PAXIL) 20 MG tablet Take 20 mg by mouth daily 12/27/2023 Yes Unknown, Entered By History No    sodium chloride (NEBUSAL) 3 % neb solution Inhale 4 mLs into the lungs 2 times daily 12/27/2023 Yes Unknown, Entered By History

## 2023-12-28 NOTE — ED PROVIDER NOTES
"    History     Chief Complaint:  SOB    HPI   Lara Melendez is a 58 year old female who has a history of COPD and presents with dyspnea. The patient has had a cough, rhinorrhea, and dyspnea for the past day. She used a nebulizer at home with minimal relief. She was hypoxic on arrival, and was thus put on 4 liters nasal cannula. She denies fever, chest pain, abdominal pain, or sore throat. She denies recent antibiotics/hospitalizations. No known sick contacts.     Independent Historian:    None    Review of External Notes:  10/2/23 COPD exacerbation    Medications:    albuterol   amiodarone  eliquis  atorvastatin   clonazepam   cyclobenzaprine   diclofenac   diltiazem ER   fluticasone-salmeterol   ipratropium - albuterol   levothyroxine   multivitamin w/minerals  nicotine  Paroxetine     Past Medical History:    COPD  Anxiety  Colon diverticulitis  Hypertension  Hypothyroidism  COVID-19  Atrial fibrillation  Psoriasis  Hyperlipidemia  Mandibular abscess  Acute respiratory failure with hypoxia  Pneumonia    Past Surgical History:    Cholecystectomy  Colostomy  I and D mandible  Tooth extraction  Vocal cord surgery     Physical Exam   Patient Vitals for the past 24 hrs:   BP Temp Temp src Pulse Resp SpO2 Height Weight   12/28/23 0430 136/84 -- -- 98 20 99 % -- --   12/28/23 0421 (!) 165/116 96.9  F (36.1  C) Axillary 101 18 98 % 1.626 m (5' 4\") 65 kg (143 lb 4.8 oz)   12/28/23 0401 -- -- -- -- 22 96 % -- --   12/28/23 0347 -- -- -- 90 -- 95 % -- --   12/28/23 0337 (!) 139/90 -- -- 98 -- 95 % -- --   12/28/23 0317 136/79 -- -- 99 -- 96 % -- --   12/28/23 0257 127/85 -- -- 99 -- 95 % -- --   12/28/23 0237 (!) 140/88 -- -- 103 -- 95 % -- --   12/28/23 0217 122/86 -- -- 105 -- 95 % -- --   12/28/23 0157 134/86 -- -- 115 -- 96 % -- --   12/28/23 0140 137/87 -- -- 115 22 96 % -- --   12/28/23 0045 (!) 160/99 -- -- 110 24 95 % -- --   12/28/23 0007 -- -- -- 93 18 99 % -- --   12/27/23 2345 (!) 185/107 -- -- 98 23 97 % -- " "--   12/27/23 2257 (!) 188/116 -- -- -- -- -- -- --   12/27/23 2255 -- 98.5  F (36.9  C) Oral 104 24 97 % 1.626 m (5' 4\") 63 kg (139 lb)      Physical Exam  Nursing note and vitals reviewed.  Constitutional: Well nourished. Moderate respiratory distress; 4L NC  Eyes: Conjunctiva normal.  Pupils are equal, round, and reactive to light.   ENT: Nose normal. Mucous membranes pink and moist.  TM normal. No posterior oropharyngeal erythema/exudate. Uvula midline  Neck: Normal range of motion.  CVS: Sinus tachycardia.  Normal heart sounds.    Pulmonary: Lungs with diffuse wheezing  GI: Abdomen soft. Nontender, nondistended. No rigidity or guarding.    MSK: No calf tenderness or swelling.  Neuro: Alert. Follows simple commands.  Skin: Skin is warm and dry. No rash noted.   Psychiatric: Normal affect.     Emergency Department Course   ECG  ECG taken at 2301, ECG read at 2303  Normal sinus rhythm  ST and T wave abnormality, consider inferior ischemia  ST and T wave abnormality, consider anterolateral ischemia  Abnormal ECG   Rate 100 bpm. RI interval 148 ms. QRS duration 82 ms. QT/QTc 386/497 ms. P-R-T axes 40 72 -84.    ECG taken at 0032, ECG read at 0032  Sinus tachycardia  ST and T wave abnormality, consider inferior ischemia  ST and T wave abnormality, consider anterolateral ischemia  Abnormal ECG  Rate 107 bpm. RI interval 164 ms. QRS duration 76 ms. QT/QTc 382/509 ms. P-R-T axes 86 73 -38.    Imaging:  XR Chest Port 1 View   Final Result   IMPRESSION: Calcific scarring left apex. Both lungs are otherwise clear. No adenopathy or effusion. Normal cardiac size and pulmonary vascularity. Atherosclerotic changes involving the thoracic arch. Mild degenerative changes both shoulders and the    spine. Monitoring leads have been placed overlying the chest. Otherwise, no change.        Report per radiology    Laboratory:  Labs Ordered and Resulted from Time of ED Arrival to Time of ED Departure   BASIC METABOLIC PANEL - Abnormal "       Result Value    Sodium 137      Potassium 3.8      Chloride 102      Carbon Dioxide (CO2) 25      Anion Gap 10      Urea Nitrogen 8.7      Creatinine 0.88      GFR Estimate 75      Calcium 9.0      Glucose 138 (*)    TROPONIN T, HIGH SENSITIVITY - Abnormal    Troponin T, High Sensitivity 15 (*)    ISTAT GASES LACTATE VENOUS POCT - Abnormal    Lactic Acid POCT 0.8      Bicarbonate Venous POCT 26      O2 Sat, Venous POCT 89 (*)     pCO2 Venous POCT 54 (*)     pH Venous POCT 7.29 (*)     pO2 Venous POCT 64 (*)    INFLUENZA A/B, RSV, & SARS-COV2 PCR - Normal    Influenza A PCR Negative      Influenza B PCR Negative      RSV PCR Negative      SARS CoV2 PCR Negative     CBC WITH PLATELETS AND DIFFERENTIAL    WBC Count 9.4      RBC Count 4.29      Hemoglobin 13.8      Hematocrit 42.0      MCV 98      MCH 32.2      MCHC 32.9      RDW 14.6      Platelet Count 176      % Neutrophils 66      % Lymphocytes 22      % Monocytes 9      % Eosinophils 2      % Basophils 0      % Immature Granulocytes 1      NRBCs per 100 WBC 0      Absolute Neutrophils 6.2      Absolute Lymphocytes 2.1      Absolute Monocytes 0.9      Absolute Eosinophils 0.2      Absolute Basophils 0.0      Absolute Immature Granulocytes 0.1      Absolute NRBCs 0.0     TROPONIN T, HIGH SENSITIVITY      Emergency Department Course & Assessments:    Interventions:  Medications   amiodarone (PACERONE) tablet 200 mg (has no administration in time range)   apixaban ANTICOAGULANT (ELIQUIS) tablet 5 mg (has no administration in time range)   atorvastatin (LIPITOR) tablet 20 mg (has no administration in time range)   clonazePAM (klonoPIN) tablet 0.5 mg (has no administration in time range)   cyclobenzaprine (FLEXERIL) tablet 5 mg (has no administration in time range)   diltiazem ER COATED BEADS (CARDIZEM CD/CARTIA XT) 24 hr capsule 240 mg (has no administration in time range)   ipratropium - albuterol 0.5 mg/2.5 mg/3 mL (DUONEB) neb solution 3 mL (has no  administration in time range)   levothyroxine (SYNTHROID/LEVOTHROID) tablet 112 mcg (has no administration in time range)   magnesium oxide (MAG-OX) tablet 400 mg (has no administration in time range)   nicotine (NICODERM CQ) 21 MG/24HR 24 hr patch 1 patch (has no administration in time range)     And   nicotine Patch in Place (0 each Transdermal Hold 12/28/23 0506)   PARoxetine (PAXIL) tablet 20 mg (has no administration in time range)   acetaminophen (TYLENOL) tablet 650 mg (650 mg Oral $Given 12/28/23 0442)   methylPREDNISolone sodium succinate (solu-MEDROL) injection 62.5 mg (has no administration in time range)   albuterol (PROVENTIL) neb solution 2.5 mg (has no administration in time range)   glucose gel 15-30 g (has no administration in time range)     Or   dextrose 50 % injection 25-50 mL (has no administration in time range)     Or   glucagon injection 1 mg (has no administration in time range)   Patient is already receiving anticoagulation with heparin, enoxaparin (LOVENOX), warfarin (COUMADIN)  or other anticoagulant medication (has no administration in time range)   ondansetron (ZOFRAN ODT) ODT tab 4 mg (has no administration in time range)     Or   ondansetron (ZOFRAN) injection 4 mg (has no administration in time range)   famotidine (PEPCID) injection 20 mg (20 mg Intravenous Not Given 12/28/23 0506)   ipratropium - albuterol 0.5 mg/2.5 mg/3 mL (DUONEB) neb solution 3 mL (3 mLs Nebulization $Given 12/27/23 2349)   methylPREDNISolone sodium succinate (solu-MEDROL) injection 125 mg (125 mg Intravenous $Given 12/27/23 2319)   ipratropium - albuterol 0.5 mg/2.5 mg/3 mL (DUONEB) neb solution 3 mL (3 mLs Nebulization $Given 12/28/23 0012)   LORazepam (ATIVAN) injection 0.5 mg (0.5 mg Intravenous $Given 12/28/23 0036)      Assessments:  2320 I obtained history and examined patient.   2350 I rechecked the patient and explained findings. Patient was put on BiPAP.    Independent Interpretation (X-rays, CTs,  rhythm strip):  CXR: no focal pneumonia    Consultations/Discussion of Management or Tests:  0103 I spoke with Dr. Baker of the Hospitalist service to discuss the patient's findings, presentation, and plan of care.     Social Determinants of Health affecting care:  None     Disposition:  The patient was admitted to the hospital under the care of Dr. Baker.   Impression & Plan    Medical Decision Making:  Patient is a 58-year-old female presenting predominantly with cough and difficulty breathing.  She is ill-appearing on arrival, in moderate respiratory distress with diffuse wheezing.  She was given breathing treatments in addition to IV steroids though remained tachypneic precipitating placement on BiPAP.  She looks much more comfortable thereafter.  She also was given a small dose of Ativan as she was quite anxious though appeared much calmer after this.  Chest x-ray without focal pneumonia, fluid overload, widened mediastinum or pneumothorax.  EKG with nonspecific ST and T wave changes though she denies any active chest pain at bedside.  I doubt ACS, PE or serious cardiac etiology today.  Patient remained otherwise hemodynamically stable, accepted by hospitalist for admission for management of COPD exacerbation.    Critical Care time:  was 35 minutes for this patient excluding procedures.    Diagnosis:    ICD-10-CM    1. COPD exacerbation (H)  J44.1       2. Acute respiratory failure, unspecified whether with hypoxia or hypercapnia (H)  J96.00            Scribe Disclosure:  Diomedes WHITE Hired, am serving as a scribe at 11:53 PM on 12/27/2023 to document services personally performed by Teresita Kenny DO based on my observations and the provider's statements to me.  12/27/2023   Teresita Kenny DO McDonald, Lindsey E, DO  12/28/23 0516

## 2023-12-28 NOTE — H&P
[facility]    History and Physical - Hospitalist Service       Date of Admission:  12/27/2023    Assessment & Plan      Lara Melendez is a 58 year old female admitted on 12/27/2023. She presented with respiratory distress, COPD exacerbation, ABG compatible with mixed respiratory failure and respiratory acidosis.  Severe bronchospasm on exam.    Acute hypoxemic/hypercarbic respiratory failure superimposed to COPD exacerbation.  Respiratory acidosis secondary to the above.  Severe, generalized bronchospasm.  She has tested negative for limited respiratory virus panel, I will request full testing of other viruses.  She does not seems to have a bacterial infection, normal lab workup and not toxemic aspect or febrile.  Patient reports of being compliant with her treatment.  Apparently, exacerbations similar to this one. happened in the past during the season.     Plan.  Admit to ICU.  BiPAP therapy overnight.  Respiratory therapy to assist.  N.p.o. until a.m.  DuoNeb every 6 hours.  Albuterol rescue nebs every 2 hours as needed.  Famotidine for gastric protection.  DOAC for DVT prophylaxis.  Methylprednisolone IV and transition to oral once off BiPAP.    Other active chronic health problems.    Hypercholesterolemia.  Atorvastatin.  Essential hypertension.  On diltiazem.  Hypothyroidism.  On Synthroid.  Anxiety.  On clonazepam and Paxil.  History of PAF.  DOAC anticoagulation, rate control with amiodarone and diltiazem.       Diet:  N.p.o.  DVT Prophylaxis: Apixaban  Hines Catheter: Not present  Lines: None     Cardiac Monitoring: None  Code Status:  Full code    Clinically Significant Risk Factors Present on Admission    2            # Drug Induced Coagulation Defect: home medication list includes an anticoagulant medication    # Hypertension: Noted on problem list   # Non-Invasive mechanical ventilation: current O2 Device: BiPAP/CPAP  # Acute hypoxic respiratory failure: continue supplemental O2  "as needed                Disposition Plan   Likely home in next 24 to 48 hours.       Geovani Baker MD  Hospitalist Service  [facility]  Securely message with [employer] (more info)  Text page via [employer] Paging/Directory     ______________________________________________________________________    Chief Complaint   Worsening shortness of breath.    History is obtained from the patient, electronic health record, and emergency department physician    History of Present Illness   Lara Melendez is a 58 year old female who has a history of COPD.  She presented to emergency department with acute respiratory distress, laboring, significant bronchospasm and negative testing for intercurrent viral infections or evidence of bacterial infection.  A full respiratory panel is requested, pending performance.  Most significant finding in her lab workup he is presence of ABG with respiratory acidosis, hypercarbia and hypoxemia.  She has been started on BiPAP therapy, she received methylprednisone 125 mg IV and nebulization therapy.  Patient reported compliance with her home medication.  At the moment of my interview she is feeling much better.  Discussed her case with Dr. Kenny who is requesting admission to the ICU for BiPAP overnight.    Vital signs:  Temp: 98.5  F (36.9  C) Temp src: Oral BP: 137/87 Pulse: 115   Resp: 22 SpO2: 96 % O2 Device: BiPAP/CPAP Oxygen Delivery: 4 LPM Height: 162.6 cm (5' 4\") Weight: 63 kg (139 lb)  Estimated body mass index is 23.86 kg/m  as calculated from the following:    Height as of this encounter: 1.626 m (5' 4\").    Weight as of this encounter: 63 kg (139 lb).    Laboratory workup:  Viral panel negative for influenza RSV and SARS COVID 19.  CBC with normal white count and differential.  Chemistry within normal limits.  Glycemia 138.  Troponin T high-sensitivity 15.  ABG.  pH 7.29, pCO2 54, pO2 64, oxygen saturation 89, bicarbonate 26.    Past Medical History    Past " Medical History:   Diagnosis Date    Anxiety     COPD (chronic obstructive pulmonary disease) - 2L home O2     Diverticulitis of colon     Hypercholesterolemia     Hypertension     Hypothyroidism     Infection due to 2019 novel coronavirus 2022    Paroxysmal atrial fibrillation     Psoriasis     Pulmonary nodule - left upper lobe        Past Surgical History   Past Surgical History:   Procedure Laterality Date    CHOLECYSTECTOMY      COLOSTOMY N/A 2023    Procedure: OPENING DIVERTING COLOSTOMY;  Surgeon: Jaspal Rashid MD;  Location: [facility] OR    INCISION AND DRAINAGE MANDIBLE, COMBINED Left 01/10/2022    Procedure: INCISION AND DRAINAGE, MANDIBLE;  Surgeon: Jn Feliz DDS;  Location: [facility] OR    INCISION AND DRAINAGE MANDIBLE, COMBINED Left 2022    Procedure: INCISION AND DRAINAGE, MANDIBLE;  Surgeon: Taylor Ortez DDS;  Location: [facility] OR    TOOTH EXTRACTION      Vocal Cord surgery         Prior to Admission Medications   Prior to Admission Medications   Prescriptions Last Dose Informant Patient Reported? Taking?   PARoxetine (PAXIL) 20 MG tablet   Yes No   Sig: Take 20 mg by mouth daily   acetaminophen (TYLENOL) 325 MG tablet   No No   Sig: Take 2 tablets (650 mg) by mouth every 4 hours as needed for mild pain   albuterol (PROAIR HFA/PROVENTIL HFA/VENTOLIN HFA) 108 (90 Base) MCG/ACT inhaler   No No   Sig: Inhale 2 puffs into the lungs every 4 hours as needed for shortness of breath / dyspnea or wheezing Inhale 1-2 Puffs every 4 hours as needed for Wheezing.   albuterol (PROVENTIL) (2.5 MG/3ML) 0.083% neb solution   No No   Sig: Take 1 vial (2.5 mg) by nebulization every 4 hours as needed for shortness of breath or wheezing   amiodarone (PACERONE) 200 MG tablet   No No   Si tablet (200 mg) by Oral or Feeding Tube route daily   apixaban ANTICOAGULANT (ELIQUIS) 5 MG tablet   No No   Sig: Take 1 tablet (5 mg) by mouth 2 times daily   atorvastatin (LIPITOR) 20 MG tablet  Self Yes No   Sig: Take 20  mg by mouth daily   clonazePAM (KLONOPIN) 0.5 MG tablet   Yes No   Sig: Take 0.5 mg by mouth daily   cyclobenzaprine (FLEXERIL) 5 MG tablet   No No   Sig: Take 1 tablet (5 mg) by mouth every 8 hours as needed for muscle spasms   diclofenac (VOLTAREN) 1 % topical gel   No No   Sig: Apply 2 g topically 4 times daily   diltiazem ER COATED BEADS (CARDIZEM CD/CARTIA XT) 240 MG 24 hr capsule   No No   Sig: Take 1 capsule (240 mg) by mouth daily   fluticasone-salmeterol (ADVAIR-HFA) 230-21 MCG/ACT inhaler   No No   Sig: Inhale 2 puffs into the lungs 2 times daily   ipratropium - albuterol 0.5 mg/2.5 mg/3 mL (DUONEB) 0.5-2.5 (3) MG/3ML neb solution   No No   Sig: Take 1 vial (3 mLs) by nebulization every 6 hours as needed for shortness of breath / dyspnea or wheezing   levothyroxine (SYNTHROID/LEVOTHROID) 112 MCG tablet  Self Yes No   Sig: Take 112 mcg by mouth daily   magnesium oxide (MAG-OX) 400 MG tablet   No No   Si tablet (400 mg) by Oral or Feeding Tube route daily   multivitamin w/minerals (THERA-VIT-M) tablet   No No   Sig: Take 1 tablet by mouth daily   nicotine (NICODERM CQ) 21 MG/24HR 24 hr patch   No No   Sig: Place 1 patch onto the skin daily      Facility-Administered Medications: None        Review of Systems    The 10 point Review of Systems is negative other than noted in the HPI or here.      Physical Exam   Vital Signs: Temp: 98.5  F (36.9  C) Temp src: Oral BP: (!) 185/107 Pulse: 93   Resp: 18 SpO2: 99 % O2 Device: BiPAP/CPAP Oxygen Delivery: 4 LPM  Weight: 139 lbs 0 oz    General Appearance: Obese. On BiPAP, mask over the face, breathing comfortably.  Respiratory: On BiPAP.  Not laboring.  Large expiration is still present.  On auscultation audibl wheezing all over her chest in the 3 planes.  Cardiovascular: Regular rate, superimposed wheezes.  GI: Abdomen soft.  Patient carries an ostomy bag which is emptied at the moment of exam.  Skin: Warm, no rashes.    Neuro intact.      Medical Decision  Making        75 MINUTES SPENT BY ME on the date of service doing chart review, history, exam, documentation & further activities per the note.      Data     I have personally reviewed the following data over the past 24 hrs:    9.4  \   13.8   / 176     137 102 8.7 /  138 (H)   3.8 25 0.88 \     Trop: 15 (H) BNP: N/A     Procal: N/A CRP: N/A Lactic Acid: 0.8         Imaging results reviewed over the past 24 hrs:   Recent Results (from the past 24 hour(s))   XR Chest Port 1 View    Narrative    EXAM: XR CHEST PORT 1 VIEW  LOCATION: [facility]  DATE: 12/28/2023    INDICATION: Shortness of breath.  COMPARISON: Chest x-ray 2 views 10/03/2023 at 0056 hours.      Impression    IMPRESSION: Calcific scarring left apex. Both lungs are otherwise clear. No adenopathy or effusion. Normal cardiac size and pulmonary vascularity. Atherosclerotic changes involving the thoracic arch. Mild degenerative changes both shoulders and the   spine. Monitoring leads have been placed overlying the chest. Otherwise, no change.

## 2023-12-28 NOTE — PROGRESS NOTES
12/28/23 1410   Appointment Info   Signing Clinician's Name / Credentials (OT) Kody Diaz EdD, OTR/L   Rehab Comments (OT) initial evaluaton   Living Environment   People in Home spouse;child(ron), adult;grandchild(ron)  (Spouse, daughter, and 2 grandchildren ages 10 and 14)   Current Living Arrangements house  (2 story with basement.  Pt does not use basement)   Home Accessibility stairs to enter home;stairs within home   Number of Stairs, Main Entrance 4   Stair Railings, Main Entrance none   Number of Stairs, Within Home, Primary greater than 10 stairs   Stair Railings, Within Home, Primary railings safe and in good condition   Transportation Anticipated car, drives self;family or friend will provide   Living Environment Comments pt is home alone during the day at baseline.  Does some care of grandchildren, works at home very occassionally on projects   Self-Care   Usual Activity Tolerance good   Current Activity Tolerance moderate   Equipment Currently Used at Home other (see comments)  (has walker, shower chair, and RTS available to use as needed)   Fall history within last six months no   General Information   Onset of Illness/Injury or Date of Surgery 12/28/23   Referring Physician Geovani Baker   Patient/Family Therapy Goal Statement (OT) return home, feel better.  Has potential reversal of a colostomy coming up in 2-3 weeks   Additional Occupational Profile Info/Pertinent History of Current Problem Pt is a 58 year old female admitted with acute hypoxic respiratory failure.  PMH includes COPD, anxiety, HTN, Afib on apixaban.  Also has had a colostomy, possible reversal surgery in 2-3 weeks   Performance Patterns (Routines, Roles, Habits) pt reports being independent in basic ADLS up until recently when she was admitted.   Existing Precautions/Restrictions oxygen therapy device and L/min  (Has it use as needed, has not used it in a long time.  Sets it at 2L if she needs to use it.)   General  Observations and Info pt in bed, feeling much better than when she came in   Cognitive Status Examination   Orientation Status orientation to person, place and time   Cognitive Status Comments no issues noted   Visual Perception   Visual Impairment/Limitations corrective lenses full-time   Pain Assessment   Patient Currently in Pain No   Range of Motion Comprehensive   General Range of Motion no range of motion deficits identified   Strength Comprehensive (MMT)   General Manual Muscle Testing (MMT) Assessment no strength deficits identified   Coordination   Upper Extremity Coordination No deficits were identified   Bed Mobility   Bed Mobility supine-sit;sit-supine   Supine-Sit [place] (Bed Mobility) independent   Sit-Supine [place] (Bed Mobility) independent   Assistive Device (Bed Mobility) bed rails   Transfers   Transfers toilet transfer   Toilet Transfer   Type (Toilet Transfer) sit-stand;stand-sit   Clinton Level (Toilet Transfer) independent   Clinical Impression   Criteria for Skilled Therapeutic Interventions Met (OT) Yes, treatment indicated   OT Diagnosis decreased independence/endurance for ADLS   OT Problem List-Impairments impacting ADL problems related to;activity tolerance impaired;fear & anxiety   Assessment of Occupational Performance 1-3 Performance Deficits   Identified Performance Deficits decreased endurance for functional mobility, housework   Planned Therapy Interventions (OT) ADL retraining   Clinical Decision Making Complexity (OT) problem focused assessment/low complexity   Risk & Benefits of therapy have been explained evaluation/treatment results reviewed;care plan/treatment goals reviewed;patient   OT Total Evaluation Time   OT Eval, Low Complexity Minutes (13768) 15   OT Goals   Therapy Frequency (OT) One time eval and treatment   OT Predicted Duration/Target Date for Goal Attainment 12/28/23   OT Goals OT Goal 1;OT Goal 2   OT: Goal 1 Pt will tolerate 10 minutes standing  ADL activity wth no complaints and O2 sats 90% or higher   OT: Goal 2 Pt will identify 3 EC techniques to use at home to increase endurance and independence for ADLS   Interventions   Interventions Quick Adds Self-Care/Home Management   Self-Care/Home Management   Self-Care/Home Mgmt/ADL, Compensatory, Meal Prep Minutes (33678) 25   Symptoms Noted During/After Treatment (Meal Preparation/Planning Training) fatigue   Treatment Detail/Skilled Intervention OT: Pt feeling better, agreeable to OOB activity.  Spent a total of 10 minutes up, 5 minutes timed ambulation and 5 minutes in bathroom for activity at sink and toilet transfers.  O2 sats at 90% or higher the whole time.  Handouts brought for EC techniujadiel, pt educated in general principles.  Able to identify 3 techniques she could use or is already using at home.   OT Discharge Planning   OT Plan discharge, all goals met   OT Discharge Recommendation (DC Rec) home;home with assist   OT Rationale for DC Rec OT: Pt feeling much better, appears at or close to her baseline.  Tolerated 10 minutes of activity out of bed including 5 minutes of continuous ambulation.  Has good support at home, will need to climb a full flight of stairs to get to main bedroom and bath.  Times her trips up and down stairs to help manage fatigue and breathing.  Pt demonstrated good overall mobility within her needs for home activiites.  No IP PT needs seen at this time.  Anticipate pt will continue to improve and retrun home with support of family.  Also has several pieces of AE she can use if needed.   OT Brief overview of current status Tolerated 10 minutes of active ADL and ambultation in room on room air.  Education with handouts on EC techniques   Total Session Time   Timed Code Treatment Minutes 25   Total Session Time (sum of timed and untimed services) 40

## 2023-12-28 NOTE — PROGRESS NOTES
Occupational Therapy Discharge Summary    Reason for therapy discharge:    All goals and outcomes met, no further needs identified.    Progress towards therapy goal(s). See goals on Care Plan in [facility] electronic health record for goal details.  Goals met    Therapy recommendation(s):    No further therapy is recommended.

## 2023-12-28 NOTE — PLAN OF CARE
Goal Outcome Evaluation:      Plan of Care Reviewed With: patient    Overall Patient Progress: improvingOverall Patient Progress: improving     ICU End of Shift Summary.  For vital signs and complete assessments, please see documentation flowsheets.      Pertinent assessments: Admitted to the unit about 0400. A&O. Lungs dim w/ inspiratory and expiratory wheezing, pt states feeling WOB much improved from pta. Bipap 12/6 25%. Tele SR w/ st depression, MD aware. Ostomy in place to abd, reddened around- chronic per pt. Up to bedside commode SBA.     Lines: piv x1    Plan (Upcoming Events): Off bipap when ready. Solumedrol, nebs.  Discharge/Transfer Needs: TBD

## 2023-12-28 NOTE — DISCHARGE SUMMARY
[facility]  Discharge Summary  Name: Lara Melendez    MRN: [id number]  YOB: 1965    Age: 58 year old  Date of Discharge:  12/28/2023  Date of Admission: 12/27/2023  Primary Care Provider: Sudhir Carroll  Discharge Physician:  James Malave MD  Discharging Service:  Hospitalist      Hospital Course/Discharge Diagnoses:  Lara Melendez is a 58 year old female with past medical history including COPD with 1 hospitalization this past summer, hypertension, anxiety, A-fib on apixaban admitted on 12/27/2023 with cough, shortness of breath and expiratory wheezing compatible with COPD.  She does report 1 sick contact to a likely viral illness.     Here in the ER she was mildly tachycardic but not hypoxic.  She was hypertensive with expiratory wheezing and felt to be in some respiratory distress with increased work of breathing.  Workup here was otherwise fairly unremarkable with grossly normal BMP, negative troponin, normal lactic acid and negative influenza, RSV and COVID-19 testing.  CBC was likewise grossly normal.  Chest x-ray was negative for infiltrate.  EKG showed sinus tachycardia with nonspecific ST changes.  She has denied chest pain or pressure throughout and serial troponins were flat and barely detectable.     She was initially admitted to the ICU for IV steroids and ongoing nebs with BiPAP support but shortly after arrival BiPAP was removed and she now is breathing comfortably on room air for the duration of the day.  She does have some ongoing mild end expiratory wheezes compatible with lingering bronchospasm but she feels well and tells me she is ready to discharge home.  She will continue her home nebs there.     She has been transitioned to oral prednisone.  She does have a nebulizer machine at home as well as a rescue inhaler and Breo Ellipta.     Problem List/Assessment and Plan:   Acute respiratory hypercapnic failure due to apparent COPD exacerbation: As above.   Improved quite quickly with IV steroids, nebs and BiPAP support.  --Transitioned to oral prednisone for 3 days more  --Continue home Breo Ellipta, as needed Xopenex/albuterol nebs at home.  --Suspect viral syndrome as the likely cause/trigger here.     Other active chronic health problems.    Hypercholesterolemia.  Atorvastatin.  Essential hypertension.  On diltiazem, losartan.  Hypothyroidism.  On Synthroid.  Anxiety.  On clonazepam and Paxil.  History of PAF.  DOAC anticoagulation, rate control with amiodarone and diltiazem.        Discharge Disposition:  Discharged to home     Allergies:  Allergies   Allergen Reactions    Sulfa Antibiotics     Doxycycline Other (See Comments)     Develops chest tightness  Intolerance not allergy    Penicillins Rash     Generalized rash  Rash, Generalized          Discharge Medications:        Review of your medicines        START taking        Dose / Directions   predniSONE 20 MG tablet  Commonly known as: DELTASONE  Used for: COPD exacerbation (H)      Dose: 40 mg  Start taking on: December 29, 2023  Take 2 tablets (40 mg) by mouth daily for 3 days  Quantity: 6 tablet  Refills: 0            CONTINUE these medicines which have NOT CHANGED        Dose / Directions   acetaminophen 325 MG tablet  Commonly known as: TYLENOL  Used for: Acute and chronic respiratory failure with hypoxia (H)      Dose: 650 mg  Take 2 tablets (650 mg) by mouth every 4 hours as needed for mild pain  Refills: 0     * albuterol 108 (90 Base) MCG/ACT inhaler  Commonly known as: PROAIR HFA/PROVENTIL HFA/VENTOLIN HFA  Used for: Chronic obstructive pulmonary disease, unspecified COPD type (H)      Dose: 2 puff  Inhale 2 puffs into the lungs every 4 hours as needed for shortness of breath / dyspnea or wheezing Inhale 1-2 Puffs every 4 hours as needed for Wheezing.  Quantity: 18 g  Refills: 0     * albuterol (2.5 MG/3ML) 0.083% neb solution  Commonly known as: PROVENTIL  Used for: COPD exacerbation (H)      Dose:  2.5 mg  Take 1 vial (2.5 mg) by nebulization every 4 hours as needed for shortness of breath or wheezing  Quantity: 30 mL  Refills: 0     amiodarone 200 MG tablet  Commonly known as: PACERONE  Used for: Paroxysmal atrial fibrillation with RVR (H)      Dose: 200 mg  1 tablet (200 mg) by Oral or Feeding Tube route daily  Refills: 0     apixaban ANTICOAGULANT 5 MG tablet  Commonly known as: ELIQUIS  Indication: Atrial Fibrillation Not Caused By A Heart Valve Problem  Used for: Atrial fibrillation with rapid ventricular response (H)      Dose: 5 mg  Take 1 tablet (5 mg) by mouth 2 times daily  Quantity: 60 tablet  Refills: 0     atorvastatin 20 MG tablet  Commonly known as: LIPITOR      Dose: 20 mg  Take 20 mg by mouth daily  Refills: 0     clonazePAM 0.5 MG tablet  Commonly known as: klonoPIN      Dose: 0.5 mg  Take 0.5 mg by mouth daily  Refills: 0     fluticasone-vilanterol 200-25 MCG/ACT inhaler  Commonly known as: BREO ELLIPTA      Dose: 1 Dose  Inhale 1 Dose into the lungs daily  Refills: 0     ipratropium - albuterol 0.5 mg/2.5 mg/3 mL 0.5-2.5 (3) MG/3ML neb solution  Commonly known as: DUONEB  Used for: COPD exacerbation (H)      Dose: 3 mL  Take 1 vial (3 mLs) by nebulization every 6 hours as needed for shortness of breath / dyspnea or wheezing  Quantity: 90 mL  Refills: 1     levalbuterol 0.63 MG/3ML neb solution  Commonly known as: XOPENEX      Dose: 0.63 mg  Inhale 0.63 mg into the lungs every 4 hours as needed  Refills: 0     levothyroxine 112 MCG tablet  Commonly known as: SYNTHROID/LEVOTHROID      Dose: 112 mcg  Take 112 mcg by mouth daily  Refills: 0     losartan 25 MG tablet  Commonly known as: COZAAR      Dose: 1 tablet  Take 1 tablet by mouth daily  Refills: 0     PARoxetine 20 MG tablet  Commonly known as: PAXIL      Dose: 20 mg  Take 20 mg by mouth daily  Refills: 0     sodium chloride 3 % neb solution  Commonly known as: NEBUSAL      Dose: 4 mL  Inhale 4 mLs into the lungs 2 times daily  Refills:  "0           * This list has 2 medication(s) that are the same as other medications prescribed for you. Read the directions carefully, and ask your doctor or other care provider to review them with you.                   Where to get your medicines        These medications were sent to [facility] [place], [place] - [address] [address] [facility]  [address] [facility] [address]      Phone: [phone]   predniSONE 20 MG tablet         Condition on Discharge:  Discharge condition: Stable       Code status on discharge: Full Code     History of Illness:  See detailed admission note for full details.    Physical Exam:  Vital signs:  Temp: 97.2  F (36.2  C) Temp src: Temporal BP: 123/75 Pulse: 91   Resp: 20 SpO2: 92 % O2 Device: None (Room air) Oxygen Delivery: 4 LPM Height: 162.6 cm (5' 4\") Weight: 65 kg (143 lb 4.8 oz)  Estimated body mass index is 24.6 kg/m  as calculated from the following:    Height as of this encounter: 1.626 m (5' 4\").    Weight as of this encounter: 65 kg (143 lb 4.8 oz).    Wt Readings from Last 1 Encounters:   12/28/23 65 kg (143 lb 4.8 oz)     General: Alert, awake, no acute distress.  HEENT: NC/AT, eyes anicteric, external occular movements intact, face symmetric.    Cardiac: RRR, S1, S2.  No murmurs appreciated.  Pulmonary: Normal chest rise, normal work of breathing.  End expiratory wheezing noted.  Abdomen: soft, non-tender, non-distended.  Bowel Sounds Present.  No guarding.  Extremities: no deformities.  Warm, well perfused.  Skin: no rashes or lesions noted.  Warm and Dry.  Neuro: No focal deficits noted.  Speech clear.  Coordination and strength grossly normal.  Psych: Appropriate affect.    Procedures other than Imaging:  none     Imaging:  Recent Results (from the past 48 hour(s))   XR Chest Port 1 View    Narrative    EXAM: XR CHEST PORT 1 VIEW  LOCATION: [facility]  DATE: 12/28/2023    INDICATION: Shortness of breath.  COMPARISON: Chest " x-ray 2 views 10/03/2023 at 0056 hours.      Impression    IMPRESSION: Calcific scarring left apex. Both lungs are otherwise clear. No adenopathy or effusion. Normal cardiac size and pulmonary vascularity. Atherosclerotic changes involving the thoracic arch. Mild degenerative changes both shoulders and the   spine. Monitoring leads have been placed overlying the chest. Otherwise, no change.        Consultations:  No consultations were requested during this admission.       Recent Lab Results:  Recent Labs   Lab 12/27/23  2348   WBC 9.4   HGB 13.8   HCT 42.0   MCV 98             Lab Results   Component Value Date     12/27/2023     10/03/2023     08/03/2023     02/02/2006     02/01/2006     01/31/2006    Lab Results   Component Value Date    CHLORIDE 102 12/27/2023    CHLORIDE 103 10/03/2023    CHLORIDE 100 08/03/2023    CHLORIDE 99 06/21/2023    CHLORIDE 103 05/17/2022    CHLORIDE 106 05/16/2022    CHLORIDE 107 05/15/2022    CHLORIDE 109 02/02/2006    CHLORIDE 107 02/01/2006    CHLORIDE 100 01/31/2006    Lab Results   Component Value Date    BUN 8.7 12/27/2023    BUN 9.7 10/03/2023    BUN 3.6 08/03/2023    BUN 7 06/21/2023    BUN 17 05/17/2022    BUN 14 05/16/2022    BUN 10 05/15/2022    BUN <2 02/02/2006    BUN 3 02/01/2006    BUN 4 01/31/2006      Lab Results   Component Value Date    POTASSIUM 3.8 12/27/2023    POTASSIUM 4.2 10/03/2023    POTASSIUM 4.0 08/07/2023    POTASSIUM 5.1 06/21/2023    POTASSIUM 4.5 05/17/2022    POTASSIUM 4.6 05/16/2022    POTASSIUM 4.1 05/15/2022    POTASSIUM 3.4 02/02/2006    POTASSIUM 3.5 02/01/2006    POTASSIUM 3.9 01/31/2006    Lab Results   Component Value Date    CO2 25 12/27/2023    CO2 23 10/03/2023    CO2 25 08/03/2023    CO2 29 05/17/2022    CO2 31 05/16/2022    CO2 24 05/15/2022    CO2 17 02/02/2006    CO2 23 02/01/2006    CO2 26 01/31/2006    Lab Results   Component Value Date    CR 0.88 12/27/2023    CR 0.73 10/03/2023    CR 0.51  08/03/2023    CR 0.80 02/02/2006    CR 0.90 02/01/2006    CR 0.90 01/31/2006             Pending Results:    Unresulted Labs Ordered in the Past 30 Days of this Admission       No orders found for last 31 day(s).             Discharge Instructions and Follow-Up:   Discharge Procedure Orders   Reason for your hospital stay   Order Comments: You were admitted for a copd exacerbation which is likely due to a viral illness     Follow-up and recommended labs and tests    Order Comments: Follow up routinely with your PCP to revisit your copd management and recovery from this hospital stay.     Activity   Order Comments: Your activity upon discharge: gradually resume light activity as tolerated     Order Specific Question Answer Comments   Is discharge order? Yes      Diet   Order Comments: Follow this diet upon discharge: Orders Placed This Encounter      Advance Diet as Tolerated: Regular Diet Adult; Regular Diet Adult     Order Specific Question Answer Comments   Is discharge order? Yes        Total time spent in face to face contact with the patient and coordinating discharge was:  60 Minutes.

## 2024-01-09 ENCOUNTER — APPOINTMENT (OUTPATIENT)
Dept: GENERAL RADIOLOGY | Facility: CLINIC | Age: 59
End: 2024-01-09
Attending: EMERGENCY MEDICINE
Payer: COMMERCIAL

## 2024-01-09 ENCOUNTER — HOSPITAL ENCOUNTER (INPATIENT)
Facility: CLINIC | Age: 59
LOS: 1 days | Discharge: HOME | End: 2024-01-10
Attending: EMERGENCY MEDICINE | Admitting: INTERNAL MEDICINE
Payer: COMMERCIAL

## 2024-01-09 DIAGNOSIS — J44.1 COPD EXACERBATION (H): ICD-10-CM

## 2024-01-09 DIAGNOSIS — J44.1 COPD WITH ACUTE EXACERBATION (H): ICD-10-CM

## 2024-01-09 DIAGNOSIS — J96.02 ACUTE RESPIRATORY FAILURE WITH HYPOXIA AND HYPERCAPNIA (H): ICD-10-CM

## 2024-01-09 DIAGNOSIS — J96.01 ACUTE RESPIRATORY FAILURE WITH HYPOXIA AND HYPERCAPNIA (H): ICD-10-CM

## 2024-01-09 LAB
ALBUMIN SERPL BCG-MCNC: 4.6 G/DL (ref 3.5–5.2)
ALP SERPL-CCNC: 91 U/L (ref 40–150)
ALT SERPL W P-5'-P-CCNC: 34 U/L (ref 0–50)
ANION GAP SERPL CALCULATED.3IONS-SCNC: 11 MMOL/L (ref 7–15)
AST SERPL W P-5'-P-CCNC: 18 U/L (ref 0–45)
ATRIAL RATE - MUSE: 87 BPM
BASE EXCESS BLDV CALC-SCNC: 1.9 MMOL/L (ref -7.7–1.9)
BASOPHILS # BLD AUTO: 0.1 10E3/UL (ref 0–0.2)
BASOPHILS NFR BLD AUTO: 1 %
BILIRUB SERPL-MCNC: 0.2 MG/DL
BUN SERPL-MCNC: 8.2 MG/DL (ref 8–23)
C PNEUM DNA SPEC QL NAA+PROBE: NOT DETECTED
CALCIUM SERPL-MCNC: 9.1 MG/DL (ref 8.8–10.2)
CHLORIDE SERPL-SCNC: 98 MMOL/L (ref 98–107)
CREAT SERPL-MCNC: 0.86 MG/DL (ref 0.51–0.95)
DEPRECATED HCO3 PLAS-SCNC: 27 MMOL/L (ref 22–29)
DIASTOLIC BLOOD PRESSURE - MUSE: NORMAL MMHG
EGFRCR SERPLBLD CKD-EPI 2021: 77 ML/MIN/1.73M2
EOSINOPHIL # BLD AUTO: 0.3 10E3/UL (ref 0–0.7)
EOSINOPHIL NFR BLD AUTO: 3 %
ERYTHROCYTE [DISTWIDTH] IN BLOOD BY AUTOMATED COUNT: 14.5 % (ref 10–15)
FLUAV H1 2009 PAND RNA SPEC QL NAA+PROBE: NOT DETECTED
FLUAV H1 RNA SPEC QL NAA+PROBE: NOT DETECTED
FLUAV H3 RNA SPEC QL NAA+PROBE: NOT DETECTED
FLUAV RNA SPEC QL NAA+PROBE: NEGATIVE
FLUAV RNA SPEC QL NAA+PROBE: NOT DETECTED
FLUBV RNA RESP QL NAA+PROBE: NEGATIVE
FLUBV RNA SPEC QL NAA+PROBE: NOT DETECTED
GLUCOSE BLDC GLUCOMTR-MCNC: 135 MG/DL (ref 70–99)
GLUCOSE SERPL-MCNC: 111 MG/DL (ref 70–99)
HADV DNA SPEC QL NAA+PROBE: NOT DETECTED
HCO3 BLDV-SCNC: 29 MMOL/L (ref 21–28)
HCOV PNL SPEC NAA+PROBE: NOT DETECTED
HCT VFR BLD AUTO: 46.6 % (ref 35–47)
HGB BLD-MCNC: 15.3 G/DL (ref 11.7–15.7)
HMPV RNA SPEC QL NAA+PROBE: NOT DETECTED
HPIV1 RNA SPEC QL NAA+PROBE: NOT DETECTED
HPIV2 RNA SPEC QL NAA+PROBE: NOT DETECTED
HPIV3 RNA SPEC QL NAA+PROBE: NOT DETECTED
HPIV4 RNA SPEC QL NAA+PROBE: NOT DETECTED
IMM GRANULOCYTES # BLD: 0.2 10E3/UL
IMM GRANULOCYTES NFR BLD: 2 %
INTERPRETATION ECG - MUSE: NORMAL
LACTATE SERPL-SCNC: 0.7 MMOL/L (ref 0.7–2)
LYMPHOCYTES # BLD AUTO: 3.3 10E3/UL (ref 0.8–5.3)
LYMPHOCYTES NFR BLD AUTO: 34 %
M PNEUMO DNA SPEC QL NAA+PROBE: NOT DETECTED
MAGNESIUM SERPL-MCNC: 2.3 MG/DL (ref 1.7–2.3)
MCH RBC QN AUTO: 32 PG (ref 26.5–33)
MCHC RBC AUTO-ENTMCNC: 32.8 G/DL (ref 31.5–36.5)
MCV RBC AUTO: 98 FL (ref 78–100)
MONOCYTES # BLD AUTO: 0.8 10E3/UL (ref 0–1.3)
MONOCYTES NFR BLD AUTO: 8 %
NEUTROPHILS # BLD AUTO: 5.1 10E3/UL (ref 1.6–8.3)
NEUTROPHILS NFR BLD AUTO: 52 %
NRBC # BLD AUTO: 0 10E3/UL
NRBC BLD AUTO-RTO: 0 /100
NT-PROBNP SERPL-MCNC: 150 PG/ML (ref 0–900)
O2/TOTAL GAS SETTING VFR VENT: 40 %
P AXIS - MUSE: 44 DEGREES
PCO2 BLDV: 55 MM HG (ref 40–50)
PH BLDV: 7.33 [PH] (ref 7.32–7.43)
PLATELET # BLD AUTO: 258 10E3/UL (ref 150–450)
PO2 BLDV: 106 MM HG (ref 25–47)
POTASSIUM SERPL-SCNC: 4.2 MMOL/L (ref 3.4–5.3)
PR INTERVAL - MUSE: 164 MS
PROT SERPL-MCNC: 7.8 G/DL (ref 6.4–8.3)
QRS DURATION - MUSE: 76 MS
QT - MUSE: 384 MS
QTC - MUSE: 462 MS
R AXIS - MUSE: 76 DEGREES
RBC # BLD AUTO: 4.78 10E6/UL (ref 3.8–5.2)
RSV RNA SPEC NAA+PROBE: NEGATIVE
RSV RNA SPEC QL NAA+PROBE: NOT DETECTED
RSV RNA SPEC QL NAA+PROBE: NOT DETECTED
RV+EV RNA SPEC QL NAA+PROBE: NOT DETECTED
SARS-COV-2 RNA RESP QL NAA+PROBE: NEGATIVE
SODIUM SERPL-SCNC: 136 MMOL/L (ref 135–145)
SYSTOLIC BLOOD PRESSURE - MUSE: NORMAL MMHG
T AXIS - MUSE: -29 DEGREES
TROPONIN T SERPL HS-MCNC: 16 NG/L
VENTRICULAR RATE- MUSE: 87 BPM
WBC # BLD AUTO: 9.7 10E3/UL (ref 4–11)

## 2024-01-09 PROCEDURE — 82803 BLOOD GASES ANY COMBINATION: CPT | Performed by: EMERGENCY MEDICINE

## 2024-01-09 PROCEDURE — 99291 CRITICAL CARE FIRST HOUR: CPT | Mod: 25

## 2024-01-09 PROCEDURE — 258N000003 HC RX IP 258 OP 636: Performed by: HOSPITALIST

## 2024-01-09 PROCEDURE — 250N000009 HC RX 250: Performed by: EMERGENCY MEDICINE

## 2024-01-09 PROCEDURE — 83880 ASSAY OF NATRIURETIC PEPTIDE: CPT | Performed by: EMERGENCY MEDICINE

## 2024-01-09 PROCEDURE — 5A09357 ASSISTANCE WITH RESPIRATORY VENTILATION, LESS THAN 24 CONSECUTIVE HOURS, CONTINUOUS POSITIVE AIRWAY PRESSURE: ICD-10-PCS | Performed by: HOSPITALIST

## 2024-01-09 PROCEDURE — 96365 THER/PROPH/DIAG IV INF INIT: CPT

## 2024-01-09 PROCEDURE — 84484 ASSAY OF TROPONIN QUANT: CPT | Performed by: EMERGENCY MEDICINE

## 2024-01-09 PROCEDURE — 250N000011 HC RX IP 250 OP 636: Performed by: INTERNAL MEDICINE

## 2024-01-09 PROCEDURE — 250N000011 HC RX IP 250 OP 636: Performed by: HOSPITALIST

## 2024-01-09 PROCEDURE — 94640 AIRWAY INHALATION TREATMENT: CPT | Mod: 76

## 2024-01-09 PROCEDURE — 250N000009 HC RX 250: Performed by: INTERNAL MEDICINE

## 2024-01-09 PROCEDURE — 250N000011 HC RX IP 250 OP 636: Performed by: EMERGENCY MEDICINE

## 2024-01-09 PROCEDURE — 250N000013 HC RX MED GY IP 250 OP 250 PS 637: Performed by: INTERNAL MEDICINE

## 2024-01-09 PROCEDURE — 94660 CPAP INITIATION&MGMT: CPT

## 2024-01-09 PROCEDURE — 87637 SARSCOV2&INF A&B&RSV AMP PRB: CPT | Performed by: EMERGENCY MEDICINE

## 2024-01-09 PROCEDURE — 250N000009 HC RX 250: Performed by: HOSPITALIST

## 2024-01-09 PROCEDURE — 94640 AIRWAY INHALATION TREATMENT: CPT

## 2024-01-09 PROCEDURE — 71045 X-RAY EXAM CHEST 1 VIEW: CPT

## 2024-01-09 PROCEDURE — 99223 1ST HOSP IP/OBS HIGH 75: CPT | Performed by: HOSPITALIST

## 2024-01-09 PROCEDURE — 99418 PROLNG IP/OBS E/M EA 15 MIN: CPT | Performed by: HOSPITALIST

## 2024-01-09 PROCEDURE — 99207 PR APP CREDIT; MD BILLING SHARED VISIT: CPT | Performed by: INTERNAL MEDICINE

## 2024-01-09 PROCEDURE — 85025 COMPLETE CBC W/AUTO DIFF WBC: CPT | Performed by: EMERGENCY MEDICINE

## 2024-01-09 PROCEDURE — 83605 ASSAY OF LACTIC ACID: CPT | Performed by: EMERGENCY MEDICINE

## 2024-01-09 PROCEDURE — 36415 COLL VENOUS BLD VENIPUNCTURE: CPT | Performed by: EMERGENCY MEDICINE

## 2024-01-09 PROCEDURE — 120N000001 HC R&B MED SURG/OB

## 2024-01-09 PROCEDURE — 96375 TX/PRO/DX INJ NEW DRUG ADDON: CPT

## 2024-01-09 PROCEDURE — 999N000157 HC STATISTIC RCP TIME EA 10 MIN

## 2024-01-09 PROCEDURE — 87633 RESP VIRUS 12-25 TARGETS: CPT | Performed by: INTERNAL MEDICINE

## 2024-01-09 PROCEDURE — 93005 ELECTROCARDIOGRAM TRACING: CPT

## 2024-01-09 PROCEDURE — 83735 ASSAY OF MAGNESIUM: CPT | Performed by: EMERGENCY MEDICINE

## 2024-01-09 PROCEDURE — 82040 ASSAY OF SERUM ALBUMIN: CPT | Performed by: EMERGENCY MEDICINE

## 2024-01-09 RX ORDER — BISACODYL 10 MG
10 SUPPOSITORY, RECTAL RECTAL DAILY PRN
Status: DISCONTINUED | OUTPATIENT
Start: 2024-01-09 | End: 2024-01-10 | Stop reason: HOSPADM

## 2024-01-09 RX ORDER — SODIUM CHLORIDE FOR INHALATION 3 %
3 VIAL, NEBULIZER (ML) INHALATION 2 TIMES DAILY
Status: DISCONTINUED | OUTPATIENT
Start: 2024-01-09 | End: 2024-01-10 | Stop reason: HOSPADM

## 2024-01-09 RX ORDER — ONDANSETRON 2 MG/ML
4 INJECTION INTRAMUSCULAR; INTRAVENOUS EVERY 6 HOURS PRN
Status: DISCONTINUED | OUTPATIENT
Start: 2024-01-09 | End: 2024-01-10 | Stop reason: HOSPADM

## 2024-01-09 RX ORDER — NICOTINE POLACRILEX 4 MG
15-30 LOZENGE BUCCAL
Status: DISCONTINUED | OUTPATIENT
Start: 2024-01-09 | End: 2024-01-10 | Stop reason: HOSPADM

## 2024-01-09 RX ORDER — IPRATROPIUM BROMIDE AND ALBUTEROL SULFATE 2.5; .5 MG/3ML; MG/3ML
3 SOLUTION RESPIRATORY (INHALATION)
Status: DISCONTINUED | OUTPATIENT
Start: 2024-01-09 | End: 2024-01-10 | Stop reason: HOSPADM

## 2024-01-09 RX ORDER — PAROXETINE 20 MG/1
20 TABLET, FILM COATED ORAL DAILY
Status: DISCONTINUED | OUTPATIENT
Start: 2024-01-09 | End: 2024-01-10 | Stop reason: HOSPADM

## 2024-01-09 RX ORDER — ATORVASTATIN CALCIUM 20 MG/1
20 TABLET, FILM COATED ORAL DAILY
Status: DISCONTINUED | OUTPATIENT
Start: 2024-01-09 | End: 2024-01-10 | Stop reason: HOSPADM

## 2024-01-09 RX ORDER — POLYETHYLENE GLYCOL 3350 17 G/17G
17 POWDER, FOR SOLUTION ORAL DAILY PRN
Status: DISCONTINUED | OUTPATIENT
Start: 2024-01-09 | End: 2024-01-10 | Stop reason: HOSPADM

## 2024-01-09 RX ORDER — IPRATROPIUM BROMIDE AND ALBUTEROL SULFATE 2.5; .5 MG/3ML; MG/3ML
3 SOLUTION RESPIRATORY (INHALATION)
Status: COMPLETED | OUTPATIENT
Start: 2024-01-09 | End: 2024-01-09

## 2024-01-09 RX ORDER — LOSARTAN POTASSIUM 25 MG/1
25 TABLET ORAL DAILY
Status: DISCONTINUED | OUTPATIENT
Start: 2024-01-09 | End: 2024-01-10 | Stop reason: HOSPADM

## 2024-01-09 RX ORDER — LEVOTHYROXINE SODIUM 112 UG/1
112 TABLET ORAL DAILY
Status: DISCONTINUED | OUTPATIENT
Start: 2024-01-09 | End: 2024-01-10 | Stop reason: HOSPADM

## 2024-01-09 RX ORDER — ONDANSETRON 4 MG/1
4 TABLET, ORALLY DISINTEGRATING ORAL EVERY 6 HOURS PRN
Status: DISCONTINUED | OUTPATIENT
Start: 2024-01-09 | End: 2024-01-10 | Stop reason: HOSPADM

## 2024-01-09 RX ORDER — CARBOXYMETHYLCELLULOSE SODIUM 5 MG/ML
1 SOLUTION/ DROPS OPHTHALMIC
Status: DISCONTINUED | OUTPATIENT
Start: 2024-01-09 | End: 2024-01-10 | Stop reason: HOSPADM

## 2024-01-09 RX ORDER — AMIODARONE HYDROCHLORIDE 200 MG/1
200 TABLET ORAL DAILY
Status: DISCONTINUED | OUTPATIENT
Start: 2024-01-09 | End: 2024-01-10 | Stop reason: HOSPADM

## 2024-01-09 RX ORDER — ACETAMINOPHEN 325 MG/1
650 TABLET ORAL EVERY 4 HOURS PRN
Status: DISCONTINUED | OUTPATIENT
Start: 2024-01-09 | End: 2024-01-10 | Stop reason: HOSPADM

## 2024-01-09 RX ORDER — ACETAMINOPHEN 650 MG/1
650 SUPPOSITORY RECTAL EVERY 4 HOURS PRN
Status: DISCONTINUED | OUTPATIENT
Start: 2024-01-09 | End: 2024-01-10 | Stop reason: HOSPADM

## 2024-01-09 RX ORDER — METHYLPREDNISOLONE SODIUM SUCCINATE 125 MG/2ML
125 INJECTION, POWDER, LYOPHILIZED, FOR SOLUTION INTRAMUSCULAR; INTRAVENOUS ONCE
Status: COMPLETED | OUTPATIENT
Start: 2024-01-09 | End: 2024-01-09

## 2024-01-09 RX ORDER — ALBUTEROL SULFATE 0.83 MG/ML
2.5 SOLUTION RESPIRATORY (INHALATION)
Status: DISCONTINUED | OUTPATIENT
Start: 2024-01-09 | End: 2024-01-10 | Stop reason: HOSPADM

## 2024-01-09 RX ORDER — METHYLPREDNISOLONE SODIUM SUCCINATE 40 MG/ML
40 INJECTION, POWDER, LYOPHILIZED, FOR SOLUTION INTRAMUSCULAR; INTRAVENOUS EVERY 8 HOURS
Status: COMPLETED | OUTPATIENT
Start: 2024-01-09 | End: 2024-01-09

## 2024-01-09 RX ORDER — AMOXICILLIN 250 MG
1 CAPSULE ORAL 2 TIMES DAILY PRN
Status: DISCONTINUED | OUTPATIENT
Start: 2024-01-09 | End: 2024-01-10 | Stop reason: HOSPADM

## 2024-01-09 RX ORDER — PREDNISONE 20 MG/1
60 TABLET ORAL DAILY
Status: DISCONTINUED | OUTPATIENT
Start: 2024-01-10 | End: 2024-01-10 | Stop reason: HOSPADM

## 2024-01-09 RX ORDER — DEXTROSE MONOHYDRATE 25 G/50ML
25-50 INJECTION, SOLUTION INTRAVENOUS
Status: DISCONTINUED | OUTPATIENT
Start: 2024-01-09 | End: 2024-01-10 | Stop reason: HOSPADM

## 2024-01-09 RX ORDER — MAGNESIUM SULFATE HEPTAHYDRATE 40 MG/ML
2 INJECTION, SOLUTION INTRAVENOUS ONCE
Status: COMPLETED | OUTPATIENT
Start: 2024-01-09 | End: 2024-01-09

## 2024-01-09 RX ORDER — CLONAZEPAM 0.5 MG/1
0.5 TABLET ORAL DAILY
Status: DISCONTINUED | OUTPATIENT
Start: 2024-01-09 | End: 2024-01-10 | Stop reason: HOSPADM

## 2024-01-09 RX ORDER — AZITHROMYCIN 250 MG/1
250 TABLET, FILM COATED ORAL DAILY
Status: DISCONTINUED | OUTPATIENT
Start: 2024-01-10 | End: 2024-01-10 | Stop reason: HOSPADM

## 2024-01-09 RX ORDER — AZITHROMYCIN 500 MG/5ML
500 INJECTION, POWDER, LYOPHILIZED, FOR SOLUTION INTRAVENOUS EVERY 24 HOURS
Status: DISCONTINUED | OUTPATIENT
Start: 2024-01-09 | End: 2024-01-09

## 2024-01-09 RX ORDER — AMOXICILLIN 250 MG
2 CAPSULE ORAL 2 TIMES DAILY PRN
Status: DISCONTINUED | OUTPATIENT
Start: 2024-01-09 | End: 2024-01-10 | Stop reason: HOSPADM

## 2024-01-09 RX ORDER — SODIUM CHLORIDE FOR INHALATION 3 %
3 VIAL, NEBULIZER (ML) INHALATION
Status: DISCONTINUED | OUTPATIENT
Start: 2024-01-09 | End: 2024-01-09

## 2024-01-09 RX ADMIN — AZITHROMYCIN MONOHYDRATE 500 MG: 500 INJECTION, POWDER, LYOPHILIZED, FOR SOLUTION INTRAVENOUS at 08:45

## 2024-01-09 RX ADMIN — LOSARTAN POTASSIUM 25 MG: 25 TABLET, FILM COATED ORAL at 14:05

## 2024-01-09 RX ADMIN — LEVOTHYROXINE SODIUM 112 MCG: 0.11 TABLET ORAL at 14:06

## 2024-01-09 RX ADMIN — AMIODARONE HYDROCHLORIDE 200 MG: 200 TABLET ORAL at 14:05

## 2024-01-09 RX ADMIN — PAROXETINE HYDROCHLORIDE 20 MG: 20 TABLET, FILM COATED ORAL at 14:06

## 2024-01-09 RX ADMIN — ATORVASTATIN CALCIUM 20 MG: 20 TABLET, FILM COATED ORAL at 14:06

## 2024-01-09 RX ADMIN — METHYLPREDNISOLONE SODIUM SUCCINATE 40 MG: 40 INJECTION, POWDER, FOR SOLUTION INTRAMUSCULAR; INTRAVENOUS at 14:06

## 2024-01-09 RX ADMIN — ACETAMINOPHEN 650 MG: 325 TABLET, FILM COATED ORAL at 21:19

## 2024-01-09 RX ADMIN — METHYLPREDNISOLONE SODIUM SUCCINATE 125 MG: 125 INJECTION, POWDER, FOR SOLUTION INTRAMUSCULAR; INTRAVENOUS at 05:15

## 2024-01-09 RX ADMIN — IPRATROPIUM BROMIDE AND ALBUTEROL SULFATE 3 ML: .5; 3 SOLUTION RESPIRATORY (INHALATION) at 16:23

## 2024-01-09 RX ADMIN — IPRATROPIUM BROMIDE AND ALBUTEROL SULFATE 3 ML: .5; 3 SOLUTION RESPIRATORY (INHALATION) at 05:13

## 2024-01-09 RX ADMIN — CLONAZEPAM 0.5 MG: 0.5 TABLET ORAL at 14:05

## 2024-01-09 RX ADMIN — APIXABAN 5 MG: 5 TABLET, FILM COATED ORAL at 21:17

## 2024-01-09 RX ADMIN — APIXABAN 5 MG: 5 TABLET, FILM COATED ORAL at 14:05

## 2024-01-09 RX ADMIN — IPRATROPIUM BROMIDE AND ALBUTEROL SULFATE 3 ML: .5; 3 SOLUTION RESPIRATORY (INHALATION) at 05:16

## 2024-01-09 RX ADMIN — IPRATROPIUM BROMIDE AND ALBUTEROL SULFATE 3 ML: .5; 3 SOLUTION RESPIRATORY (INHALATION) at 05:17

## 2024-01-09 RX ADMIN — ACETAMINOPHEN 650 MG: 325 TABLET, FILM COATED ORAL at 14:05

## 2024-01-09 RX ADMIN — IPRATROPIUM BROMIDE AND ALBUTEROL SULFATE 3 ML: .5; 3 SOLUTION RESPIRATORY (INHALATION) at 08:02

## 2024-01-09 RX ADMIN — IPRATROPIUM BROMIDE AND ALBUTEROL SULFATE 3 ML: .5; 3 SOLUTION RESPIRATORY (INHALATION) at 19:22

## 2024-01-09 RX ADMIN — IPRATROPIUM BROMIDE AND ALBUTEROL SULFATE 3 ML: .5; 3 SOLUTION RESPIRATORY (INHALATION) at 11:36

## 2024-01-09 RX ADMIN — MAGNESIUM SULFATE HEPTAHYDRATE 2 G: 2 INJECTION, SOLUTION INTRAVENOUS at 05:15

## 2024-01-09 ASSESSMENT — ACTIVITIES OF DAILY LIVING (ADL)
ADLS_ACUITY_SCORE: 24

## 2024-01-09 NOTE — PLAN OF CARE
Goal Outcome Evaluation:      Plan of Care Reviewed With: patient    Overall Patient Progress: improvingOverall Patient Progress: improving     ICU End of Shift Summary.  For vital signs and complete assessments, please see documentation flowsheets.      Neuro: A&O  Cardiac: BP hypertensive at admit  Resp: Lungs exp wheezy, already feels better than pta  GI: ostomy in place  : WDL  Skin: WDL  Lines: piv x2    Plan (Upcoming Events): solumedrol, zithro  Discharge/Transfer Needs: transition to orals, stable off bipap

## 2024-01-09 NOTE — PLAN OF CARE
"Orientation: A/O x4  VS: BP (!) 165/88 (BP Location: Left arm)   Pulse 98   Temp 97.2  F (36.2  C) (Temporal)   Resp 28   Ht 1.626 m (5' 4\")   Wt 66 kg (145 lb 8.1 oz)   SpO2 95%   BMI 24.98 kg/m    Tele: SR  LS: wheezy, briefly on bipap for nap this afternoon. On RA when off sating low/mid 90's  GI: reg diet, ostomy emptied x2, Pt changed appliance this PM r/t leakage  : voiding w/o difficulties  Skin: ostomy analia-area red otherwise intact  Activity: SBA to indp  Pain: 6/10, down to 1/10 after tylenol  Lines: PIV x2  Plan: possible discharge tmrw, all meds switched to PO    "

## 2024-01-09 NOTE — H&P
[facility]  Hospitalist H&P    Name: Lara Melendez      MRN: [id number]  YOB: 1965    Age: 58 year old  Date of admission: 1/9/2024  Primary care provider: Sudhir Carroll            Assessment and Plan:     Lara Melendez is a 58 year old female with a history of COPD, atrial fibrillation on chronic anticoagulation with Eliquis, hypothyroidism, hypertension, anxiety who presents with shortness of breath and wheezing found to have acute hypoxic respiratory failure secondary to a COPD exacerbation.    Problem list:  Acute hypoxic respiratory failure secondary to COPD exacerbation: She is diffusely bronchospastic on examination.  She was found to be hypoxic with oxygen saturation in the 80s by EMS.  She was administered multiple nebulizer treatments and placed on CPAP and now transition to BiPAP in the emergency department.  She received IV Solu-Medrol in the ED.  She currently feels quite a bit better but lung sounds remain very tight.  For now we will continue BiPAP, IV Solu-Medrol 40 mg every 8 hours, and scheduled DuoNeb treatments.  Chest x-ray is clear and she is afebrile without leukocytosis.  However she reports thick sputum production so may have a component of bronchitis.  We will start azithromycin in part for that reason but also for anti-inflammatory properties.  Will follow-up viral respiratory testing.  To note, she is currently on a prednisone taper from her recent hospitalization and uses home oxygen only as needed.  Her maintenance inhalers include Breo Ellipta and as needed Xopenex/albuterol nebulizers.  Paroxysmal atrial fibrillation: Currently appears rate controlled.  Continue prior to admission diltiazem and amiodarone.  Also continue prior to admission Eliquis for stroke prophylaxis.  Anxiety: Continue prior to admission clonazepam and Paxil.  Hypothyroidism: Continue prior to admission levothyroxine.  Hyperlipidemia: Continue prior to admission  atorvastatin.    Clinically Significant Risk Factors Present on Admission                 # Drug Induced Coagulation Defect: home medication list includes an anticoagulant medication        # Hypertension: Noted on problem list     # Non-Invasive mechanical ventilation: current O2 Device: BiPAP/CPAP  # Acute hypoxic respiratory failure: continue supplemental O2 as needed                Code status: Full.  Admit to the ICU as an inpatient.  Prophylaxis: DOAC.  Disposition: Home in 3 to 4 days.    80 MINUTES SPENT BY ME on the date of service doing chart review, history, exam, documentation & further activities per the note.          Chief Complaint:     Shortness of breath and wheezing.         History of Present Illness:   Lara Melendez is a 58 year old female who presents with shortness of breath and wheezing.  History was obtained from my discussion with the patient at the bedside.  I also discussed the case with the ED provider.  The electronic medical record was also reviewed.    The patient has a longstanding history of advanced COPD.  She uses home oxygen only as needed.  She was actually just hospitalized at this facility in late December for an acute exacerbation of COPD requiring an ICU admission for IV steroids as well as scheduled nebulizers and BiPAP support.  She was feeling better at the time of discharge and was sent home on an oral prednisone taper with continued use of her nebulizers.    She initially was feeling quite good.  However yesterday afternoon around 3 PM she noticed some worsening shortness of breath and coughing with wheeze.  She reports a productive cough.  This morning she woke up feeling even more short of breath and called EMS.  She was found to have oxygen saturation in the mid 80s.  She received multiple nebulizer treatments and was placed on CPAP and route to the hospital.  Upon arrival to the ED she was placed on BiPAP.  She currently denies any chest pain or palpitations.  She  denies any fevers or chills.  She did denies nausea, vomiting, diarrhea or abdominal pain.  Her ostomy is functioning properly.  She has no other complaints at this time.            Past Medical History:     Past Medical History:   Diagnosis Date    Anxiety     COPD (chronic obstructive pulmonary disease)     Diverticulitis of colon     Hypercholesterolemia     Hypertension     Hypothyroidism     Infection due to 2019 novel coronavirus 05/14/2022    Paroxysmal atrial fibrillation     Psoriasis     Pulmonary nodule - left upper lobe              Past Surgical History:     Past Surgical History:   Procedure Laterality Date    CHOLECYSTECTOMY      COLOSTOMY N/A 7/12/2023    Procedure: OPENING DIVERTING COLOSTOMY;  Surgeon: Jaspal Rashid MD;  Location: [facility] OR    INCISION AND DRAINAGE MANDIBLE, COMBINED Left 01/10/2022    Procedure: INCISION AND DRAINAGE, MANDIBLE;  Surgeon: Jn Feliz DDS;  Location: [facility] OR    INCISION AND DRAINAGE MANDIBLE, COMBINED Left 02/04/2022    Procedure: INCISION AND DRAINAGE, MANDIBLE;  Surgeon: Taylor Ortez DDS;  Location: [facility] OR    TOOTH EXTRACTION      Vocal Cord surgery               Social History:     Social History     Tobacco Use    Smoking status: Never    Smokeless tobacco: Never   Substance Use Topics    Alcohol use: Yes     Comment: occ             Family History:   The family history was fully reviewed and non-contributory in this case.         Allergies:     Allergies   Allergen Reactions    Sulfa Antibiotics     Doxycycline Other (See Comments)     Develops chest tightness  Intolerance not allergy    Penicillins Rash     Generalized rash  Rash, Generalized               Medications:     Prior to Admission medications    Medication Sig Last Dose Taking? Auth Provider Long Term End Date   albuterol (PROAIR HFA/PROVENTIL HFA/VENTOLIN HFA) 108 (90 Base) MCG/ACT inhaler Inhale 2 puffs into the lungs every 4 hours as needed for shortness of breath / dyspnea or wheezing  Inhale 1-2 Puffs every 4 hours as needed for Wheezing.   Mark Olsen, DO Yes    albuterol (PROVENTIL) (2.5 MG/3ML) 0.083% neb solution Take 1 vial (2.5 mg) by nebulization every 4 hours as needed for shortness of breath or wheezing   Tommie Ren MD Yes 12/28/23   amiodarone (PACERONE) 200 MG tablet 1 tablet (200 mg) by Oral or Feeding Tube route daily   Tommie Ren MD Yes    apixaban ANTICOAGULANT (ELIQUIS) 5 MG tablet Take 1 tablet (5 mg) by mouth 2 times daily   Luis Alberto Valentin MD     atorvastatin (LIPITOR) 20 MG tablet Take 20 mg by mouth daily   Reported, Patient Yes    clonazePAM (KLONOPIN) 0.5 MG tablet Take 0.5 mg by mouth daily   Unknown, Entered By History Yes    fluticasone-vilanterol (BREO ELLIPTA) 200-25 MCG/ACT inhaler Inhale 1 Dose into the lungs daily   Unknown, Entered By History     ipratropium - albuterol 0.5 mg/2.5 mg/3 mL (DUONEB) 0.5-2.5 (3) MG/3ML neb solution Take 1 vial (3 mLs) by nebulization every 6 hours as needed for shortness of breath / dyspnea or wheezing   Morales Alva MD Yes    levalbuterol (XOPENEX) 0.63 MG/3ML neb solution Inhale 0.63 mg into the lungs every 4 hours as needed   Unknown, Entered By History     levothyroxine (SYNTHROID/LEVOTHROID) 112 MCG tablet Take 112 mcg by mouth daily   Reported, Patient Yes    losartan (COZAAR) 25 MG tablet Take 1 tablet by mouth daily   Unknown, Entered By History No    PARoxetine (PAXIL) 20 MG tablet Take 20 mg by mouth daily   Unknown, Entered By History No    sodium chloride (NEBUSAL) 3 % neb solution Inhale 4 mLs into the lungs 2 times daily   Unknown, Entered By History               Review of Systems:     A Comprehensive greater than 10 system review of systems was carried out.  Pertinent positives and negatives are noted above.  Otherwise negative for contributory information.           Physical Exam:   Blood pressure (!) 154/102, pulse 92, temperature 98.2  F (36.8  C), temperature source Oral, resp. rate 28, SpO2  "96%.  Wt Readings from Last 1 Encounters:   12/28/23 65 kg (143 lb 4.8 oz)     Exam:  GENERAL: No apparent distress. Awake, alert, and fully oriented.  HEENT: Normocephalic, atraumatic. Extraocular movements intact.  CARDIOVASCULAR: Regular rate and rhythm without murmurs or rubs. No S3.  PULMONARY: Diffuse wheezing bilaterally.  ABDOMINAL: Soft, non-tender, non-distended. Bowel sounds normoactive. Ostomy present and viable.  EXTREMITIES: No cyanosis or clubbing. Trace peripheral edema.  NEUROLOGICAL: CN 2-12 grossly intact, no focal neurological deficits.  DERMATOLOGICAL: No rash, ulcer, bruising, nor jaundice.          Data:   EKG:  Personally reviewed.   Sinus rhythm with sinus arrhythmia  Nonspecific T wave abnormality  Abnormal ECG  When compared with ECG of 28-DEC-2023 00:32,  T wave inversion no longer evident in Anterior leads  QT has shortened    Laboratory:  Recent Labs   Lab 01/09/24  0513   WBC 9.7   HGB 15.3   HCT 46.6   MCV 98        Recent Labs   Lab 01/09/24 0513      POTASSIUM 4.2   CHLORIDE 98   CO2 27   ANIONGAP 11   *   BUN 8.2   CR 0.86   GFRESTIMATED 77   EDIN 9.1     Recent Labs   Lab 01/09/24  0513   AST 18   ALT 34   ALKPHOS 91   BILITOTAL 0.2     No results for input(s): \"CULT\" in the last 168 hours.    Imaging:  Recent Results (from the past 24 hour(s))   XR Chest Port 1 View    Narrative    EXAM: CHEST SINGLE VIEW PORTABLE  LOCATION: [facility]  DATE: 1/9/2024    INDICATION: Shortness of breath. Chronic obstructive pulmonary disease.  COMPARISON: 12/28/2023.    FINDINGS: Hyperinflated lungs. A small region of tiny irregular calcifications again projects over the medial aspect of the upper left lung. The lungs are otherwise clear. Normal size cardiac silhouette. Atherosclerotic calcification in the thoracic   aorta.      Impression    IMPRESSION:   1. No convincing evidence of acute cardiopulmonary disease.  2. Hyperinflated lungs, suggestive of " chronic obstructive pulmonary disease.                  Kody Prieto DO MPH  [facility] Hospitalist  [address] [place] [address]  01/09/2024

## 2024-01-09 NOTE — ED TRIAGE NOTES
Pt BIBA d/t SOB. Pt has hx of COPD. Per EMS, pt began feeling SOB yesterday around 1500. Became very SOB overnight waking pt up from sleep. Upon EMS arrival pt satting is 80s on RA, inspiratory and expiratory wheezing. Placed on CPAP and x2 duonebs en route, pt improved. VSS except hypertensive, ABCs intact, A&Ox4.     Triage Assessment (Adult)       Row Name 01/09/24 3466          Triage Assessment    Airway WDL WDL        Respiratory WDL    Respiratory WDL X;rhythm/pattern;expansion/retractions     Rhythm/Pattern, Respiratory dyspnea upon exertion;shortness of breath;tachypneic     Expansion/Accessory Muscles/Retractions expansion symmetric        Skin Circulation/Temperature WDL    Skin Circulation/Temperature WDL WDL        Cardiac WDL    Cardiac WDL WDL        Peripheral/Neurovascular WDL    Peripheral Neurovascular WDL WDL        Cognitive/Neuro/Behavioral WDL    Cognitive/Neuro/Behavioral WDL WDL

## 2024-01-09 NOTE — PROGRESS NOTES
A BiPAP of  12/6 @ 30% was applied to the pt via the mask for an increase in WOB and/or SOB.  The bridge of the nose looks good and remains intact. Pt is tolerating it well. Will continue to monitor and assess the pt's current respiratory status and needs.      Dior Barba, RT

## 2024-01-09 NOTE — PROGRESS NOTES
[facility]    Medicine Progress Note - Hospitalist Service    Date of Admission:  1/9/2024    Assessment & Plan   Lara Melendez is a 58 year old female with history of COPD, atrial fibrillation, chronic anticoagulation with Eliquis, hypothyroidism, hypertension, and anxiety. She presented to the ED on 1/9/24 with shortness of breath and wheezing. ED evaluation found her to be diffusely bronchospastic on examination.  She was hypoxic with oxygen saturation in the 80s by EMS. Laboratory evaluation was unremarkable including testing for COVID/flu/RSV.  CXR showed no pneumonia. She was treated for COPD exacerbation with BiPAP, IV Solu-Medrol, and bronchodilators. She was admitted to the ICU for further cares. She improved quickly and weaned off of BIPAP.        Problem list:    Acute hypoxic respiratory failure secondary to COPD exacerbation  Suspected bronchitis  -Oxygen and BIPAP as needed  -Transition IV Solumedrol to oral prednisone tomorrow  -Continue bronchodilator therapy  -Continue Zithromax for bronchitis  -OK to transfer to med/surg. Could possibly discharge home tomorrow.     Paroxysmal atrial fibrillation  -Currently appears rate controlled.   -Resume prior to admission diltiazem, amiodarone, and Eliquis     Anxiety  -Resume prior to admission clonazepam and Paxil.    Hypothyroidism  -Resume prior to admission levothyroxine.    Hypertension  Hyperlipidemia  -Resume prior to admission losartan and atorvastatin.     Clinically Significant Risk Factors Present on Admission                   # Drug Induced Coagulation Defect: home medication list includes an anticoagulant medication     # Hypertension: Noted on problem list     # Non-Invasive mechanical ventilation: current O2 Device: BiPAP/CPAP  # Acute hypoxic respiratory failure: continue supplemental O2 as needed          Diet:  Regular  DVT Prophylaxis: DOAC  Hines Catheter: Not present  Lines: None     Cardiac Monitoring:  None  Code Status: Full Code      Clinically Significant Risk Factors Present on Admission               # Drug Induced Coagulation Defect: home medication list includes an anticoagulant medication    # Hypertension: Noted on problem list                 Disposition Plan      Expected Discharge Date: 01/11/2024                    Quique Galvin MD  Hospitalist Service  [facility]  Securely message with [employer] (more info)  Text page via [employer] Paging/Directory   ______________________________________________________________________    Interval History   No new problems. Feeling better with less shortness of breath. Hungry.     Physical Exam   Vital Signs: Temp: 97.2  F (36.2  C) Temp src: Temporal BP: (!) 149/87 Pulse: 74   Resp: 20 SpO2: 94 % O2 Device: None (Room air)    Weight: 145 lbs 8.06 oz    GENERAL:  Comfortable. Cooperative.  PSYCH: pleasant, oriented, No acute distress.  EYES: PERRLA, Normal conjunctiva.  HEART:  Regular rate and rhythm. No JVD. Pulses normal. No edema.  LUNGS:  Clear to auscultation and a bit diminished, normal Respiratory effort.  ABDOMEN:  Soft, no hepatosplenomegaly, normal bowel sounds.  EXTREMETIES: No clubbing, cyanosis or ischemia  SKIN:  Dry to touch, No rash.      Medical Decision Making       45 MINUTES SPENT BY ME on the date of service doing chart review, history, exam, documentation & further activities per the note.      Data     I have personally reviewed the following data over the past 24 hrs:    9.7  \   15.3   / 258     136 98 8.2 /  135 (H)   4.2 27 0.86 \     ALT: 34 AST: 18 AP: 91 TBILI: 0.2   ALB: 4.6 TOT PROTEIN: 7.8 LIPASE: N/A     Trop: 16 (H) BNP: 150     Procal: N/A CRP: N/A Lactic Acid: 0.7         Imaging results reviewed over the past 24 hrs:   Recent Results (from the past 24 hour(s))   XR Chest Port 1 View    Narrative    EXAM: CHEST SINGLE VIEW PORTABLE  LOCATION: [facility]  DATE: 1/9/2024    INDICATION:  Shortness of breath. Chronic obstructive pulmonary disease.  COMPARISON: 12/28/2023.    FINDINGS: Hyperinflated lungs. A small region of tiny irregular calcifications again projects over the medial aspect of the upper left lung. The lungs are otherwise clear. Normal size cardiac silhouette. Atherosclerotic calcification in the thoracic   aorta.      Impression    IMPRESSION:   1. No convincing evidence of acute cardiopulmonary disease.  2. Hyperinflated lungs, suggestive of chronic obstructive pulmonary disease.                Recent Labs   Lab 01/09/24  0645 01/09/24  0513   WBC  --  9.7   HGB  --  15.3   MCV  --  98   PLT  --  258   NA  --  136   POTASSIUM  --  4.2   CHLORIDE  --  98   CO2  --  27   BUN  --  8.2   CR  --  0.86   ANIONGAP  --  11   EDIN  --  9.1   * 111*   ALBUMIN  --  4.6   PROTTOTAL  --  7.8   BILITOTAL  --  0.2   ALKPHOS  --  91   ALT  --  34   AST  --  18

## 2024-01-09 NOTE — PHARMACY-ADMISSION MEDICATION HISTORY
Pharmacist Admission Medication History    Admission medication history is complete. The information provided in this note is only as accurate as the sources available at the time of the update.    Information Source(s): Patient via in-person    Pertinent Information:      Changes made to PTA medication list:  Added: None  Deleted: None  Changed: None    Medication Affordability:  Not including over the counter (OTC) medications, was there a time in the past 3 months when you did not take your medications as prescribed because of cost?: No    Allergies reviewed with patient and updates made in EHR: yes    Medication History Completed By: Yesica Mohan [facility] 1/9/2024 8:28 AM    Prior to Admission medications    Medication Sig Last Dose Taking? Auth Provider Long Term End Date   albuterol (PROAIR HFA/PROVENTIL HFA/VENTOLIN HFA) 108 (90 Base) MCG/ACT inhaler Inhale 2 puffs into the lungs every 4 hours as needed for shortness of breath / dyspnea or wheezing Inhale 1-2 Puffs every 4 hours as needed for Wheezing. 1/8/2024 at am Yes Mark Olsen, DO Yes    amiodarone (PACERONE) 200 MG tablet 1 tablet (200 mg) by Oral or Feeding Tube route daily  Patient taking differently: Take 200 mg by mouth daily 1/8/2024 at am Yes Tommie Ren MD Yes    apixaban ANTICOAGULANT (ELIQUIS) 5 MG tablet Take 1 tablet (5 mg) by mouth 2 times daily 1/8/2024 at pm Yes Luis Alberto Valentin MD     atorvastatin (LIPITOR) 20 MG tablet Take 20 mg by mouth daily 1/8/2024 at am Yes Reported, Patient Yes    clonazePAM (KLONOPIN) 0.5 MG tablet Take 0.5 mg by mouth daily 1/8/2024 at am Yes Unknown, Entered By History Yes    fluticasone-vilanterol (BREO ELLIPTA) 200-25 MCG/ACT inhaler Inhale 1 Dose into the lungs daily 1/8/2024 at am Yes Unknown, Entered By History     ipratropium - albuterol 0.5 mg/2.5 mg/3 mL (DUONEB) 0.5-2.5 (3) MG/3ML neb solution Take 1 vial (3 mLs) by nebulization every 6 hours as needed for shortness of breath / dyspnea or  wheezing 1/8/2024 at am Yes Morales Alva MD Yes    levalbuterol (XOPENEX) 0.63 MG/3ML neb solution Inhale 0.63 mg into the lungs every 4 hours as needed 1/8/2024 at am Yes Unknown, Entered By History     levothyroxine (SYNTHROID/LEVOTHROID) 112 MCG tablet Take 112 mcg by mouth daily 1/8/2024 at am Yes Reported, Patient Yes    losartan (COZAAR) 25 MG tablet Take 1 tablet by mouth daily 1/8/2024 at am Yes Unknown, Entered By History No    PARoxetine (PAXIL) 20 MG tablet Take 20 mg by mouth daily 1/8/2024 at am Yes Unknown, Entered By History No    sodium chloride (NEBUSAL) 3 % neb solution Inhale 4 mLs into the lungs 2 times daily 1/8/2024 Yes Unknown, Entered By History     albuterol (PROVENTIL) (2.5 MG/3ML) 0.083% neb solution Take 1 vial (2.5 mg) by nebulization every 4 hours as needed for shortness of breath or wheezing   Tommie Ren MD Yes 12/28/23

## 2024-01-09 NOTE — ED PROVIDER NOTES
"  History   Chief Complaint:  Shortness of Breath     HPI   Lara Melendez is a 58 year old female who has a history of COPD, atrial fibrillation, anticoagulated on Eliquis, and presents with shortness of breath. The patient had shortness of breath yesterday starting at around 1500, and woke up with it worsening this morning PTA. She had a low oxygen saturation in the 80s. She has nasal cannula at home if needed. She used one nebulizer at home, and EMS administered two duonebs while en route to the ED. She was placed on CPAP, with her oxygen saturation now at 98%.     Independent Historian:    EMS - They report HPI above    Review of External Notes:  Care everywhere reviewed and epic updated    Medications:    albuterol   amiodarone   eliquis  atorvastatin   clonazePAM   fluticasone-vilanterol  ipratropium - albuterol 0.5 mg/2.5 mg/3 mL   levalbuterol   levothyroxine   losartan   Paroxetine  sodium chloride    Past Medical History:    Anxiety  COPD  Diverticulitis  Hypertension  Hypothyroidism  COVID-19  Psoriasis  Atrial fibrillation  Acute and chronic respiratory failure with hypercapnia    Past Surgical History:    Cholecystectomy  Colostomy  I and D mandible   Tooth extraction  Vocal cord surgery     Physical Exam   Patient Vitals for the past 24 hrs:   BP Temp Temp src Pulse Resp SpO2 Height Weight   01/09/24 0643 175/108 97.2  F (36.2  C) Temporal 85 26 100 % 1.626 m (5' 4\") 66 kg (145 lb 8.1 oz)   01/09/24 0617 -- -- -- 89 23 97 % -- --   01/09/24 0616 -- -- -- 87 25 97 % -- --   01/09/24 0615  143/90 -- -- 89 23 97 % -- --   01/09/24 0603 -- -- -- 92 28 96 % -- --   01/09/24 0602 -- -- -- 92 27 96 % -- --   01/09/24 0601 -- -- -- 92 26 96 % -- --   01/09/24 0600  154/102 -- -- 92 27 96 % -- --   01/09/24 0558 -- -- -- 93 23 97 % -- --   01/09/24 0548 159/116 -- -- 90 -- 97 % -- --   01/09/24 0532 -- -- -- 96 27 98 % -- --   01/09/24 0531 -- -- -- 92 21 98 % -- --   01/09/24 0530 151/94 -- -- 88 19 98 % -- " --   01/09/24 0526 -- -- -- 89 23 98 % -- --   01/09/24 0518 -- -- -- 86 20 99 % -- --   01/09/24 0517 -- -- -- 92 23 99 % -- --   01/09/24 0516 -- -- -- 92 17 99 % -- --   01/09/24 0515 155/114 -- -- 89 21 99 % -- --   01/09/24 0513 190/114 -- -- 92 23 100 % -- --   01/09/24 0508 -- 98.2  F (36.8  C) Oral 87 24 98 % -- --      Physical Exam  Nursing note and vitals reviewed.  Constitutional: Cooperative.  In respiratory distress  HENT:   Mouth/Throat: Mucous membranes are dry  Cardiovascular: Normal rate, regular rhythm and normal heart sounds.    Pulmonary/Chest: Tachypneic, increased work of breathing, diffuse expiratory wheezing and poor air movement.  Abdominal: Soft. Normal appearance.  Nontender.  Musculoskeletal: No edema in the legs.  Neurological: Alert.  Oriented x 3.  Skin: Skin is warm and dry.   Psychiatric: Mildly anxious appearing.    Emergency Department Course   ECG:  ECG results from 01/09/24   EKG 12-lead, tracing only     Value    Systolic Blood Pressure     Diastolic Blood Pressure     Ventricular Rate 87    Atrial Rate 87    HI Interval 164    QRS Duration 76        QTc 462    P Axis 44    R AXIS 76    T Axis -29    Interpretation ECG      Sinus rhythm with sinus arrhythmia  Nonspecific T wave abnormality     Imaging:  XR Chest Port 1 View   Preliminary Result   IMPRESSION:    1. No convincing evidence of acute cardiopulmonary disease.   2. Hyperinflated lungs, suggestive of chronic obstructive pulmonary disease.    Report per radiology    Laboratory:  Labs Ordered and Resulted from Time of ED Arrival to Time of ED Departure   COMPREHENSIVE METABOLIC PANEL - Abnormal       Result Value    Sodium 136      Potassium 4.2      Carbon Dioxide (CO2) 27      Anion Gap 11      Urea Nitrogen 8.2      Creatinine 0.86      GFR Estimate 77      Calcium 9.1      Chloride 98      Glucose 111 (*)     Alkaline Phosphatase 91      AST 18      ALT 34      Protein Total 7.8      Albumin 4.6      Bilirubin  Total 0.2     TROPONIN T, HIGH SENSITIVITY - Abnormal    Troponin T, High Sensitivity 16 (*)    BLOOD GAS VENOUS - Abnormal    pH Venous 7.33      pCO2 Venous 55 (*)     pO2 Venous 106 (*)     Bicarbonate Venous 29 (*)     Base Excess/Deficit 1.9      FIO2 40     MAGNESIUM - Normal    Magnesium 2.3     LACTIC ACID WHOLE BLOOD - Normal    Lactic Acid 0.7     NT PROBNP INPATIENT - Normal    N terminal Pro BNP Inpatient 150     INFLUENZA A/B, RSV, & SARS-COV2 PCR - Normal    Influenza A PCR Negative      Influenza B PCR Negative      RSV PCR Negative      SARS CoV2 PCR Negative     CBC WITH PLATELETS AND DIFFERENTIAL    WBC Count 9.7      RBC Count 4.78      Hemoglobin 15.3      Hematocrit 46.6      MCV 98      MCH 32.0      MCHC 32.8      RDW 14.5      Platelet Count 258      % Neutrophils 52      % Lymphocytes 34      % Monocytes 8      % Eosinophils 3      % Basophils 1      % Immature Granulocytes 2      NRBCs per 100 WBC 0      Absolute Neutrophils 5.1      Absolute Lymphocytes 3.3      Absolute Monocytes 0.8      Absolute Eosinophils 0.3      Absolute Basophils 0.1      Absolute Immature Granulocytes 0.2      Absolute NRBCs 0.0        Interventions:  Medications   magnesium sulfate 2 g in 50 mL sterile water intermittent infusion (2 g Intravenous $New Bag 1/9/24 0515)   methylPREDNISolone sodium succinate (solu-MEDROL) injection 125 mg (125 mg Intravenous $Given 1/9/24 0515)   ipratropium - albuterol 0.5 mg/2.5 mg/3 mL (DUONEB) neb solution 3 mL (3 mLs Nebulization $Given 1/9/24 0517)      Assessments:  0505 I obtained history and examined patient.     Independent Interpretation (X-rays, CTs, rhythm strip):  I independently reviewed the chest x-ray.  Hyperinflation of lungs bilaterally without infiltrate consistent with COPD and air trapping    Consultations/Discussion of Management or Tests:  0550 I spoke with Dr. Prieto of the Hospitalist service to discuss the patient's findings, presentation, and plan of  care.    Social Determinants of Health affecting care:  None     Disposition:  The patient was admitted to the hospital under the care of Dr. Prieto.     Impression & Plan      Medical Decision Making:  Is a 4-year-old female who presents with respiratory distress.  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  She received IV magnesium, Solu-Medrol and multiple DuoNebs here.  Blood gas shows mild respiratory acidosis.  She has a history of significant respiratory decompensation requiring intervention and ultimately tracheostomy placement in the past.  She will be admitted to the ICU for ongoing respiratory care.  This appears to be viral in etiology.    Critical Care Time: 30 minutes excluding procedures.    Diagnosis:    ICD-10-CM    1. COPD with acute exacerbation (H)  J44.1       2. Acute respiratory failure with hypoxia and hypercapnia (H)  J96.01     J96.02          Scribe Disclosure:  Diomedes WHITE, am serving as a scribe at 5:08 AM on 1/9/2024 to document services personally performed by Kody Smart MD based on my observations and the provider's statements to me.  1/9/2024   Kody Smart MD Amdahl, John, MD  01/09/24 0700

## 2024-01-09 NOTE — ED NOTES
[facility]  ED Nurse Handoff Report    ED Chief complaint: Shortness of Breath  . ED Diagnosis:   Final diagnoses:   COPD with acute exacerbation (H)       Allergies:   Allergies   Allergen Reactions    Sulfa Antibiotics     Doxycycline Other (See Comments)     Develops chest tightness  Intolerance not allergy    Penicillins Rash     Generalized rash  Rash, Generalized         Code Status: Full Code    Activity level - Baseline/Home:  independent.  Activity Level - Current:   assist of 1.   Lift room needed: No.   Bariatric: No   Needed: No   Isolation: No.   Infection: Not Applicable.     Respiratory status: BiPap    Vital Signs (within 30 minutes):   Vitals:    01/09/24 0526 01/09/24 0530 01/09/24 0531 01/09/24 0532   BP:  (!) 151/94     Pulse: 89 88 92 96   Resp: 23 19 21 27   Temp:       TempSrc:       SpO2: 98% 98% 98% 98%       Cardiac Rhythm:  ,      Pain level:    Patient confused: No.   Patient Falls Risk: patient and family education.   Elimination Status: Has voided     Patient Report - Initial Complaint: SOB.   Focused Assessment: Chief Complaint:  Shortness of Breath        HPI   Lara Melendez is a 58 year old female who has a history of COPD, atrial fibrillation, anticoagulated on Eliquis, and presents with shortness of breath. The patient had shortness of breath yesterday starting at around 1500, and woke up with it worsening this morning PTA. She had a low oxygen saturation in the 80s. She has nasal cannula at home if needed. She used one nebulizer at home, and EMS administered two duonebs while en route to the ED. She was placed on CPAP, with her oxygen saturation now at 98%.         Abnormal Results:   Labs Ordered and Resulted from Time of ED Arrival to Time of ED Departure   BLOOD GAS VENOUS - Abnormal       Result Value    pH Venous 7.33      pCO2 Venous 55 (*)     pO2 Venous 106 (*)     Bicarbonate Venous 29 (*)     Base Excess/Deficit 1.9      FIO2 40      LACTIC ACID WHOLE BLOOD - Normal    Lactic Acid 0.7     CBC WITH PLATELETS AND DIFFERENTIAL    WBC Count 9.7      RBC Count 4.78      Hemoglobin 15.3      Hematocrit 46.6      MCV 98      MCH 32.0      MCHC 32.8      RDW 14.5      Platelet Count 258      % Neutrophils 52      % Lymphocytes 34      % Monocytes 8      % Eosinophils 3      % Basophils 1      % Immature Granulocytes 2      NRBCs per 100 WBC 0      Absolute Neutrophils 5.1      Absolute Lymphocytes 3.3      Absolute Monocytes 0.8      Absolute Eosinophils 0.3      Absolute Basophils 0.1      Absolute Immature Granulocytes 0.2      Absolute NRBCs 0.0     COMPREHENSIVE METABOLIC PANEL   MAGNESIUM   TROPONIN T, HIGH SENSITIVITY   NT PROBNP INPATIENT   INFLUENZA A/B, RSV, & SARS-COV2 PCR        XR Chest Port 1 View    (Results Pending)       Treatments provided: See MAR. Frequent monitoring of pt.  Family Comments:  aware pt in hospital.  not coming to hospital tonight but is supportive in care and number is in the chart.  OBS brochure/video discussed/provided to patient:  No  ED Medications:   Medications   magnesium sulfate 2 g in 50 mL sterile water intermittent infusion (2 g Intravenous $New Bag 1/9/24 0515)   methylPREDNISolone sodium succinate (solu-MEDROL) injection 125 mg (125 mg Intravenous $Given 1/9/24 0515)   ipratropium - albuterol 0.5 mg/2.5 mg/3 mL (DUONEB) neb solution 3 mL (3 mLs Nebulization $Given 1/9/24 0517)       Drips infusing:  No  For the majority of the shift this patient was Green.   Interventions performed were N/a.    Sepsis treatment initiated: No    Cares/treatment/interventions/medications to be completed following ED care: N/a    ED Nurse Name: Nuvia Jo RN  5:36 AM

## 2024-01-10 VITALS
DIASTOLIC BLOOD PRESSURE: 95 MMHG | HEIGHT: 64 IN | SYSTOLIC BLOOD PRESSURE: 174 MMHG | WEIGHT: 145.5 LBS | HEART RATE: 75 BPM | OXYGEN SATURATION: 94 % | BODY MASS INDEX: 24.84 KG/M2 | TEMPERATURE: 99 F | RESPIRATION RATE: 18 BRPM

## 2024-01-10 PROBLEM — J96.02 ACUTE RESPIRATORY FAILURE WITH HYPOXIA AND HYPERCAPNIA (H): Status: ACTIVE | Noted: 2024-01-10

## 2024-01-10 PROBLEM — J44.1 COPD EXACERBATION (H): Status: ACTIVE | Noted: 2022-02-21

## 2024-01-10 PROBLEM — J96.01 ACUTE RESPIRATORY FAILURE WITH HYPOXIA AND HYPERCAPNIA (H): Status: ACTIVE | Noted: 2024-01-10

## 2024-01-10 PROCEDURE — 250N000012 HC RX MED GY IP 250 OP 636 PS 637: Performed by: INTERNAL MEDICINE

## 2024-01-10 PROCEDURE — 250N000009 HC RX 250: Performed by: INTERNAL MEDICINE

## 2024-01-10 PROCEDURE — 94640 AIRWAY INHALATION TREATMENT: CPT | Mod: 76

## 2024-01-10 PROCEDURE — 99239 HOSP IP/OBS DSCHRG MGMT >30: CPT | Performed by: INTERNAL MEDICINE

## 2024-01-10 PROCEDURE — 250N000013 HC RX MED GY IP 250 OP 250 PS 637: Performed by: INTERNAL MEDICINE

## 2024-01-10 PROCEDURE — 999N000157 HC STATISTIC RCP TIME EA 10 MIN

## 2024-01-10 PROCEDURE — 94640 AIRWAY INHALATION TREATMENT: CPT

## 2024-01-10 RX ORDER — AZITHROMYCIN 250 MG/1
250 TABLET, FILM COATED ORAL DAILY
Qty: 2 TABLET | Refills: 0 | Status: SHIPPED | OUTPATIENT
Start: 2024-01-11 | End: 2024-01-13

## 2024-01-10 RX ORDER — ALBUTEROL SULFATE 0.83 MG/ML
2.5 SOLUTION RESPIRATORY (INHALATION) EVERY 4 HOURS PRN
Qty: 30 ML | Refills: 0 | Status: ON HOLD | OUTPATIENT
Start: 2024-01-10 | End: 2024-08-09

## 2024-01-10 RX ORDER — PREDNISONE 10 MG/1
TABLET ORAL
Qty: 20 TABLET | Refills: 0 | Status: SHIPPED | OUTPATIENT
Start: 2024-01-10 | End: 2024-01-22

## 2024-01-10 RX ADMIN — IPRATROPIUM BROMIDE AND ALBUTEROL SULFATE 3 ML: .5; 3 SOLUTION RESPIRATORY (INHALATION) at 08:06

## 2024-01-10 RX ADMIN — ACETAMINOPHEN 650 MG: 325 TABLET, FILM COATED ORAL at 11:24

## 2024-01-10 RX ADMIN — IPRATROPIUM BROMIDE AND ALBUTEROL SULFATE 3 ML: .5; 3 SOLUTION RESPIRATORY (INHALATION) at 11:42

## 2024-01-10 RX ADMIN — LEVOTHYROXINE SODIUM 112 MCG: 0.11 TABLET ORAL at 09:10

## 2024-01-10 RX ADMIN — SODIUM CHLORIDE SOLN NEBU 3% 3 ML: 3 NEBU SOLN at 08:06

## 2024-01-10 RX ADMIN — PREDNISONE 60 MG: 20 TABLET ORAL at 09:10

## 2024-01-10 RX ADMIN — AMIODARONE HYDROCHLORIDE 200 MG: 200 TABLET ORAL at 09:10

## 2024-01-10 RX ADMIN — ATORVASTATIN CALCIUM 20 MG: 20 TABLET, FILM COATED ORAL at 09:10

## 2024-01-10 RX ADMIN — LOSARTAN POTASSIUM 25 MG: 25 TABLET, FILM COATED ORAL at 09:10

## 2024-01-10 RX ADMIN — AZITHROMYCIN DIHYDRATE 250 MG: 250 TABLET, FILM COATED ORAL at 09:11

## 2024-01-10 RX ADMIN — APIXABAN 5 MG: 5 TABLET, FILM COATED ORAL at 09:11

## 2024-01-10 RX ADMIN — PAROXETINE HYDROCHLORIDE 20 MG: 20 TABLET, FILM COATED ORAL at 09:10

## 2024-01-10 RX ADMIN — CLONAZEPAM 0.5 MG: 0.5 TABLET ORAL at 09:10

## 2024-01-10 ASSESSMENT — ACTIVITIES OF DAILY LIVING (ADL)
ADLS_ACUITY_SCORE: 24

## 2024-01-10 NOTE — UTILIZATION REVIEW
"  Admission Status; Secondary Review Determination         Under the authority of the Utilization Management Committee, the utilization review process indicated a secondary review on the above patient.  The review outcome is based on review of the medical records, discussions with staff, and applying clinical experience noted on the date of the review.          (x) Observation Status Appropriate - This patient does not meet hospital inpatient criteria and is placed in observation status. If this patient's primary payer is Medicare and was admitted as an inpatient, Condition Code 44 should be used and patient status changed to \"observation\".     RATIONALE FOR DETERMINATION     The severity of illness, intensity of service provided, expected LOS and risk for adverse outcome make the care appropriate for further observation; however, doesn't meet criteria for hospital inpatient admission.   notified of this determination.    The patient is a 58-year-old female with a history of COPD, atrial fibs chronic anticoagulation with Eliquis.  She was seen in the ED and was diffusely bronchospastic with O2 sats in the 80s chest x-ray did not show pneumonia.  She did require BiPAP and IV Solu-Medrol along with bronchodilators.  She was diagnosed with acute hypoxic respiratory failure secondary to COPD exacerbation.  Patient was started on Zithromax for bronchitis.  Patient improved significantly and being discharged to home after 1 midnight stay.  IV was discontinued.  Home medications were filled.  Based on single midnight stay, recommend changing status from inpatient to observation status.  Dr. Malave will be notified of this recommendation via [facility].      The information on this document is developed by the utilization review team in order for the business office to ensure compliance.  This only denotes the appropriateness of proper admission status and does not reflect the quality of care rendered.   "       The definitions of Inpatient Status and Observation Status used in making the determination above are those provided in the CMS Coverage Manual, Chapter 1 and Chapter 6, section 70.4.      Sincerely,     Renny Lancaster MD  Physician Advisor  Utilization Review/ Case Management  [facility].

## 2024-01-10 NOTE — CONSULTS
Care Management Discharge Note    Discharge Date: 01/10/2024       Discharge Disposition:  home    Discharge Services:      Discharge DME:      Discharge Transportation:  family    Private pay costs discussed: Not applicable    Patient/Family in Agreement with the Plan:      Handoff Referral Completed: No    Additional Information:  URR 24% Admitted with acute respiratory failure secondary to COPD. Patient is medically ready to return home and has discharge orders. Per chart review patient has no current discharge needs.  Plan to discharge home with spouse.      Bhakti Walters RN BSN OCN  Care Coordinator  [facility]  [phone]

## 2024-01-10 NOTE — PLAN OF CARE
Pertinent assessments: A&Ox4. Up ind. VSS. RA. Some dyspnea on exertion but improving per pt. Infrequent nonproductive cough. LS coarse. Ind with ostomy output & cares.     Major Shift Events :Uneventful    Treatment Plan: Symptom management. Scheduled nebs.    Bedside Nurse: Maddie Hyman RN

## 2024-01-10 NOTE — DISCHARGE SUMMARY
Discharge Note    Patient discharged to home via private vehicle  accompanied by significant other .  IV: Discontinued  Prescriptions filled and given to patient/family.   Belongings reviewed and sent with patient.   Home medications returned to patient: NA  Equipment sent with:  N/A.   patient verbalizes understanding of discharge instructions. AVS given to patient.  Additional education completed?  NA

## 2024-01-10 NOTE — PLAN OF CARE
Goal Outcome Evaluation:      Plan of Care Reviewed With: patient    Overall Patient Progress: improvingOverall Patient Progress: improving  To Do:  End of Shift Summary  For vital signs and complete assessments, please see documentation flowsheets.     Pertinent assessments: [facility] cares [phone]. Pt A&O x 4. On RA, VSS . Denied pain, SOB and nausea. Regular diet, independent in the room. Given PRN tylenol. PIV saline locked. Voiding without issues. Self manages and empties ostomy bag.     Major Shift Events : Admitted today on this floor from ICU. Possible discharge home tomorrow.    Treatment Plan: Symptom management. Scheduled nebs.    Bedside Nurse: Kia Grossman RN

## 2024-01-10 NOTE — DISCHARGE SUMMARY
[facility]  Discharge Summary  Name: Lara Melendez    MRN: [id number]  YOB: 1965    Age: 58 year old  Date of Discharge:  1/10/2024  Date of Admission: 1/9/2024  Primary Care Provider: Sudhir Carroll  Discharge Physician:  James Malave MD  Discharging Service:  Hospitalist      Hospital Course/Discharge Diagnoses:  Lara Melendez is a 58 year old female with history of COPD, atrial fibrillation, chronic anticoagulation with Eliquis, hypothyroidism, hypertension, and anxiety. She presented to the ED on 1/9/24 with shortness of breath and wheezing. ED evaluation found her to be diffusely bronchospastic on examination.  She was hypoxic with oxygen saturation in the 80s by EMS. Laboratory evaluation was unremarkable including testing for COVID/flu/RSV.  CXR showed no pneumonia. She was treated for COPD exacerbation with BiPAP, IV Solu-Medrol, and bronchodilators. She was admitted to the ICU for further cares. She improved quickly and weaned off of BIPAP fairly quickly.    She now has had multiple admissions in the past few weeks.  On further discussion with her she does describe being around quite a bit of secondhand smoke as her  and daughter both smoke.  They mainly do so in the garage she describes regularly being exposed to it.  She did ask me about nicotine patches today as well to help with cravings.  She denies specifically smoking cigarettes herself.    After her last exacerbation she describes symptoms becoming significantly worse after steroids were stopped.  This time will attempt to gradually taper off steroids and I have counseled her extensively regarding the need to avoid any smoke exposure whatsoever.  I have asked that she follow-up closely with her primary care doctor and pulmonologist as at some point she may need further adjustment in her preventative regimen as well.    She does tell me she feels well and would like to discharge home today.     Problem  list:     Acute hypoxic respiratory failure secondary to COPD exacerbation  Suspected bronchitis  -Transitioned from IV Solumedrol to oral prednisone, plan on an 8-day taper after discharge.  -Continue bronchodilator therapy.  She reports having nebs at home and I did refill DuoNebs for her.  -Continue Zithromax for bronchitis  -Needs to avoid smoke exposure/tobacco inhalation     Paroxysmal atrial fibrillation  -Currently appears rate controlled.   -Resume prior to admission diltiazem, amiodarone, and Eliquis      Anxiety  -Resume prior to admission clonazepam and Paxil.     Hypothyroidism  -Resume prior to admission levothyroxine.     Hypertension  Hyperlipidemia  -Resume prior to admission losartan and atorvastatin.     Clinically Significant Risk Factors Present on Admission                   # Drug Induced Coagulation Defect: home medication list includes an anticoagulant medication     # Hypertension: Noted on problem list     # Non-Invasive mechanical ventilation: current O2 Device: BiPAP/CPAP  # Acute hypoxic respiratory failure: continue supplemental O2 as needed           Discharge Disposition:  Discharged to home     Allergies:  Allergies   Allergen Reactions    Sulfa Antibiotics     Doxycycline Other (See Comments)     Develops chest tightness  Intolerance not allergy    Penicillins Rash     Generalized rash  Rash, Generalized          Discharge Medications:        Review of your medicines        START taking        Dose / Directions   azithromycin 250 MG tablet  Commonly known as: ZITHROMAX  Indication: bronchitis  Used for: Acute respiratory failure with hypoxia and hypercapnia (H), COPD exacerbation (H)      Dose: 250 mg  Start taking on: January 11, 2024  Take 1 tablet (250 mg) by mouth daily for 2 days  Quantity: 2 tablet  Refills: 0     predniSONE 10 MG tablet  Commonly known as: DELTASONE  Used for: Acute respiratory failure with hypoxia and hypercapnia (H), COPD exacerbation (H)      4 tabs daily  for 2 days, then 3 tabs daily for 2 days, then 2 tabs daily for 2 days, then 1 tab daily for 2 days, then stop  Quantity: 20 tablet  Refills: 0            CONTINUE these medicines which have NOT CHANGED        Dose / Directions   * albuterol 108 (90 Base) MCG/ACT inhaler  Commonly known as: PROAIR HFA/PROVENTIL HFA/VENTOLIN HFA  Used for: Chronic obstructive pulmonary disease, unspecified COPD type (H)      Dose: 2 puff  Inhale 2 puffs into the lungs every 4 hours as needed for shortness of breath / dyspnea or wheezing Inhale 1-2 Puffs every 4 hours as needed for Wheezing.  Quantity: 18 g  Refills: 0     * albuterol (2.5 MG/3ML) 0.083% neb solution  Commonly known as: PROVENTIL  Used for: COPD exacerbation (H)      Dose: 2.5 mg  Take 1 vial (2.5 mg) by nebulization every 4 hours as needed for shortness of breath or wheezing  Quantity: 30 mL  Refills: 0     amiodarone 200 MG tablet  Commonly known as: PACERONE  Used for: Paroxysmal atrial fibrillation with RVR (H)      Dose: 200 mg  1 tablet (200 mg) by Oral or Feeding Tube route daily  Refills: 0     apixaban ANTICOAGULANT 5 MG tablet  Commonly known as: ELIQUIS  Indication: Atrial Fibrillation Not Caused By A Heart Valve Problem  Used for: Atrial fibrillation with rapid ventricular response (H)      Dose: 5 mg  Take 1 tablet (5 mg) by mouth 2 times daily  Quantity: 60 tablet  Refills: 0     atorvastatin 20 MG tablet  Commonly known as: LIPITOR      Dose: 20 mg  Take 20 mg by mouth daily  Refills: 0     clonazePAM 0.5 MG tablet  Commonly known as: klonoPIN      Dose: 0.5 mg  Take 0.5 mg by mouth daily  Refills: 0     fluticasone-vilanterol 200-25 MCG/ACT inhaler  Commonly known as: BREO ELLIPTA      Dose: 1 Dose  Inhale 1 Dose into the lungs daily  Refills: 0     ipratropium - albuterol 0.5 mg/2.5 mg/3 mL 0.5-2.5 (3) MG/3ML neb solution  Commonly known as: DUONEB  Used for: COPD exacerbation (H)      Dose: 3 mL  Take 1 vial (3 mLs) by nebulization every 6 hours as  "needed for shortness of breath / dyspnea or wheezing  Quantity: 90 mL  Refills: 1     levalbuterol 0.63 MG/3ML neb solution  Commonly known as: XOPENEX      Dose: 0.63 mg  Inhale 0.63 mg into the lungs every 4 hours as needed  Refills: 0     levothyroxine 112 MCG tablet  Commonly known as: SYNTHROID/LEVOTHROID      Dose: 112 mcg  Take 112 mcg by mouth daily  Refills: 0     losartan 25 MG tablet  Commonly known as: COZAAR      Dose: 1 tablet  Take 1 tablet by mouth daily  Refills: 0     PARoxetine 20 MG tablet  Commonly known as: PAXIL      Dose: 20 mg  Take 20 mg by mouth daily  Refills: 0     sodium chloride 3 % neb solution  Commonly known as: NEBUSAL      Dose: 4 mL  Inhale 4 mLs into the lungs 2 times daily  Refills: 0           * This list has 2 medication(s) that are the same as other medications prescribed for you. Read the directions carefully, and ask your doctor or other care provider to review them with you.                   Where to get your medicines        These medications were sent to [facility] [id number] [place] [address]      Phone: [phone]   albuterol (2.5 MG/3ML) 0.083% neb solution  azithromycin 250 MG tablet  predniSONE 10 MG tablet         Condition on Discharge:  Discharge condition: Stable       Code status on discharge: Full Code     History of Illness:  See detailed admission note for full details.    Physical Exam:  Vital signs:  Temp: 99  F (37.2  C) Temp src: Oral BP: (!) 174/95 Pulse: 74   Resp: 20 SpO2: 92 % O2 Device: None (Room air)   Height: 162.6 cm (5' 4\") Weight: 66 kg (145 lb 8.1 oz)  Estimated body mass index is 24.98 kg/m  as calculated from the following:    Height as of this encounter: 1.626 m (5' 4\").    Weight as of this encounter: 66 kg (145 lb 8.1 oz).    Wt Readings from Last 1 Encounters:   01/09/24 66 kg (145 lb 8.1 oz)     General: Alert, awake, no acute distress.  HEENT: NC/AT, eyes " anicteric, external occular movements intact, face symmetric.    Cardiac: RRR, S1, S2.  No murmurs appreciated.  Pulmonary: Normal chest rise, normal work of breathing.  Lungs CTA BL aside from end exp wheezes.  Abdomen: soft, non-tender, non-distended.  Bowel Sounds Present.  No guarding.  Extremities: no deformities.  Warm, well perfused.  Skin: no rashes or lesions noted.  Warm and Dry.  Neuro: No focal deficits noted.  Speech clear.  Coordination and strength grossly normal.  Psych: Appropriate affect.    Procedures other than Imaging:  none     Imaging:  Results for orders placed or performed during the hospital encounter of 01/09/24   XR Chest Port 1 View    Narrative    EXAM: CHEST SINGLE VIEW PORTABLE  LOCATION: [facility]  DATE: 1/9/2024    INDICATION: Shortness of breath. Chronic obstructive pulmonary disease.  COMPARISON: 12/28/2023.    FINDINGS: Hyperinflated lungs. A small region of tiny irregular calcifications again projects over the medial aspect of the upper left lung. The lungs are otherwise clear. Normal size cardiac silhouette. Atherosclerotic calcification in the thoracic   aorta.      Impression    IMPRESSION:   1. No convincing evidence of acute cardiopulmonary disease.  2. Hyperinflated lungs, suggestive of chronic obstructive pulmonary disease.                   Consultations:  No consultations were requested during this admission.       Recent Lab Results:  Recent Labs   Lab 01/09/24  0513   WBC 9.7   HGB 15.3   HCT 46.6   MCV 98             Lab Results   Component Value Date     01/09/2024     12/27/2023     10/03/2023     02/02/2006     02/01/2006     01/31/2006    Lab Results   Component Value Date    CHLORIDE 98 01/09/2024    CHLORIDE 102 12/27/2023    CHLORIDE 103 10/03/2023    CHLORIDE 99 06/21/2023    CHLORIDE 103 05/17/2022    CHLORIDE 106 05/16/2022    CHLORIDE 107 05/15/2022    CHLORIDE 109 02/02/2006    CHLORIDE  107 02/01/2006    CHLORIDE 100 01/31/2006    Lab Results   Component Value Date    BUN 8.2 01/09/2024    BUN 8.7 12/27/2023    BUN 9.7 10/03/2023    BUN 7 06/21/2023    BUN 17 05/17/2022    BUN 14 05/16/2022    BUN 10 05/15/2022    BUN <2 02/02/2006    BUN 3 02/01/2006    BUN 4 01/31/2006      Lab Results   Component Value Date    POTASSIUM 4.2 01/09/2024    POTASSIUM 3.8 12/27/2023    POTASSIUM 4.2 10/03/2023    POTASSIUM 5.1 06/21/2023    POTASSIUM 4.5 05/17/2022    POTASSIUM 4.6 05/16/2022    POTASSIUM 4.1 05/15/2022    POTASSIUM 3.4 02/02/2006    POTASSIUM 3.5 02/01/2006    POTASSIUM 3.9 01/31/2006    Lab Results   Component Value Date    CO2 27 01/09/2024    CO2 25 12/27/2023    CO2 23 10/03/2023    CO2 29 05/17/2022    CO2 31 05/16/2022    CO2 24 05/15/2022    CO2 17 02/02/2006    CO2 23 02/01/2006    CO2 26 01/31/2006    Lab Results   Component Value Date    CR 0.86 01/09/2024    CR 0.88 12/27/2023    CR 0.73 10/03/2023    CR 0.80 02/02/2006    CR 0.90 02/01/2006    CR 0.90 01/31/2006             Pending Results:    Unresulted Labs Ordered in the Past 30 Days of this Admission       No orders found for last 31 day(s).             Discharge Instructions and Follow-Up:   Discharge Procedure Orders   Reason for your hospital stay   Order Comments: You were admitted for a copd exacerbation     Follow-up and recommended labs and tests    Order Comments: Follow up with primary care provider, Sudhir Carroll, within 7 days for hospital follow- up.  The following labs/tests are recommended: please review your copd regimen, discuss whether having access to a prednisone burst might be reasonable.     Activity   Order Comments: Your activity upon discharge: activity as tolerated     Order Specific Question Answer Comments   Is discharge order? Yes      Diet   Order Comments: Follow this diet upon discharge: Orders Placed This Encounter      Regular Diet Adult     Order Specific Question Answer Comments   Is discharge  order? Yes        Total time spent in face to face contact with the patient and coordinating discharge was:  60 Minutes.

## 2024-01-12 ENCOUNTER — PATIENT OUTREACH (OUTPATIENT)
Dept: CARE COORDINATION | Facility: CLINIC | Age: 59
End: 2024-01-12
Payer: COMMERCIAL

## 2024-01-12 NOTE — PROGRESS NOTES
Connected Care Resource Center:   [facility] Contact  [facility]/Voicemail     Clinical Data: Post-Discharge Outreach     Outreach attempted x 2.  Left message on patient's voicemail, providing [facility]'s central phone number of [phone] ([phone]) for questions/concerns and/or to schedule an appt with an [facility] provider, if they do not have a PCP.      Plan:  [facility] will do no further outreaches at this time.       STANLEY Chen  Connected Care Resource Center, [facility]    *Connected Care Resource Team does NOT follow patient ongoing. Referrals are identified based on internal discharge reports and the outreach is to ensure patient has an understanding of their discharge instructions.

## 2024-01-22 ENCOUNTER — APPOINTMENT (OUTPATIENT)
Dept: GENERAL RADIOLOGY | Facility: CLINIC | Age: 59
End: 2024-01-22
Attending: EMERGENCY MEDICINE
Payer: COMMERCIAL

## 2024-01-22 ENCOUNTER — HOSPITAL ENCOUNTER (INPATIENT)
Facility: CLINIC | Age: 59
LOS: 23 days | Discharge: HOME HEALTH | End: 2024-02-14
Attending: EMERGENCY MEDICINE | Admitting: INTERNAL MEDICINE
Payer: COMMERCIAL

## 2024-01-22 DIAGNOSIS — K94.09 SKIN ULCERATION AT COLOSTOMY SITE (H): Primary | ICD-10-CM

## 2024-01-22 DIAGNOSIS — I48.91 ATRIAL FIBRILLATION WITH RAPID VENTRICULAR RESPONSE (H): ICD-10-CM

## 2024-01-22 DIAGNOSIS — I48.0 PAROXYSMAL ATRIAL FIBRILLATION WITH RVR (H): ICD-10-CM

## 2024-01-22 DIAGNOSIS — J96.22 ACUTE AND CHRONIC RESPIRATORY FAILURE WITH HYPERCAPNIA (H): ICD-10-CM

## 2024-01-22 DIAGNOSIS — J96.01 ACUTE RESPIRATORY FAILURE WITH HYPOXIA (H): ICD-10-CM

## 2024-01-22 DIAGNOSIS — J44.1 COPD EXACERBATION (H): ICD-10-CM

## 2024-01-22 DIAGNOSIS — Z43.3 ATTENTION TO COLOSTOMY (H): ICD-10-CM

## 2024-01-22 LAB
ALBUMIN SERPL BCG-MCNC: 4.4 G/DL (ref 3.5–5.2)
ALP SERPL-CCNC: 80 U/L (ref 40–150)
ALT SERPL W P-5'-P-CCNC: 40 U/L (ref 0–50)
ANION GAP SERPL CALCULATED.3IONS-SCNC: 13 MMOL/L (ref 7–15)
AST SERPL W P-5'-P-CCNC: 27 U/L (ref 0–45)
BASOPHILS # BLD AUTO: 0 10E3/UL (ref 0–0.2)
BASOPHILS NFR BLD AUTO: 0 %
BILIRUB SERPL-MCNC: 0.3 MG/DL
BUN SERPL-MCNC: 8.2 MG/DL (ref 8–23)
CALCIUM SERPL-MCNC: 9.3 MG/DL (ref 8.8–10.2)
CHLORIDE SERPL-SCNC: 100 MMOL/L (ref 98–107)
CREAT SERPL-MCNC: 0.93 MG/DL (ref 0.51–0.95)
DEPRECATED HCO3 PLAS-SCNC: 25 MMOL/L (ref 22–29)
EGFRCR SERPLBLD CKD-EPI 2021: 70 ML/MIN/1.73M2
EOSINOPHIL # BLD AUTO: 0.5 10E3/UL (ref 0–0.7)
EOSINOPHIL NFR BLD AUTO: 5 %
ERYTHROCYTE [DISTWIDTH] IN BLOOD BY AUTOMATED COUNT: 14.3 % (ref 10–15)
FLUAV RNA SPEC QL NAA+PROBE: NEGATIVE
FLUBV RNA RESP QL NAA+PROBE: NEGATIVE
GLUCOSE SERPL-MCNC: 126 MG/DL (ref 70–99)
HCO3 BLDV-SCNC: 27 MMOL/L (ref 21–28)
HCT VFR BLD AUTO: 44.6 % (ref 35–47)
HGB BLD-MCNC: 15 G/DL (ref 11.7–15.7)
IMM GRANULOCYTES # BLD: 0.1 10E3/UL
IMM GRANULOCYTES NFR BLD: 1 %
LACTATE BLD-SCNC: 1 MMOL/L
LYMPHOCYTES # BLD AUTO: 2 10E3/UL (ref 0.8–5.3)
LYMPHOCYTES NFR BLD AUTO: 20 %
MAGNESIUM SERPL-MCNC: 2.2 MG/DL (ref 1.7–2.3)
MCH RBC QN AUTO: 32.9 PG (ref 26.5–33)
MCHC RBC AUTO-ENTMCNC: 33.6 G/DL (ref 31.5–36.5)
MCV RBC AUTO: 98 FL (ref 78–100)
MONOCYTES # BLD AUTO: 0.8 10E3/UL (ref 0–1.3)
MONOCYTES NFR BLD AUTO: 8 %
NEUTROPHILS # BLD AUTO: 6.9 10E3/UL (ref 1.6–8.3)
NEUTROPHILS NFR BLD AUTO: 66 %
NRBC # BLD AUTO: 0 10E3/UL
NRBC BLD AUTO-RTO: 0 /100
PCO2 BLDV: 42 MM HG (ref 40–50)
PH BLDV: 7.42 [PH] (ref 7.32–7.43)
PLATELET # BLD AUTO: 201 10E3/UL (ref 150–450)
PO2 BLDV: 103 MM HG (ref 25–47)
POTASSIUM SERPL-SCNC: 4.3 MMOL/L (ref 3.4–5.3)
PROT SERPL-MCNC: 7.2 G/DL (ref 6.4–8.3)
RBC # BLD AUTO: 4.56 10E6/UL (ref 3.8–5.2)
RSV RNA SPEC NAA+PROBE: NEGATIVE
SAO2 % BLDV: 98 % (ref 70–75)
SARS-COV-2 RNA RESP QL NAA+PROBE: NEGATIVE
SODIUM SERPL-SCNC: 138 MMOL/L (ref 135–145)
WBC # BLD AUTO: 10.3 10E3/UL (ref 4–11)

## 2024-01-22 PROCEDURE — 96375 TX/PRO/DX INJ NEW DRUG ADDON: CPT

## 2024-01-22 PROCEDURE — 96374 THER/PROPH/DIAG INJ IV PUSH: CPT

## 2024-01-22 PROCEDURE — 71045 X-RAY EXAM CHEST 1 VIEW: CPT

## 2024-01-22 PROCEDURE — 999N000157 HC STATISTIC RCP TIME EA 10 MIN

## 2024-01-22 PROCEDURE — 250N000013 HC RX MED GY IP 250 OP 250 PS 637: Performed by: INTERNAL MEDICINE

## 2024-01-22 PROCEDURE — 258N000003 HC RX IP 258 OP 636: Performed by: EMERGENCY MEDICINE

## 2024-01-22 PROCEDURE — 36415 COLL VENOUS BLD VENIPUNCTURE: CPT | Performed by: EMERGENCY MEDICINE

## 2024-01-22 PROCEDURE — 82803 BLOOD GASES ANY COMBINATION: CPT

## 2024-01-22 PROCEDURE — 80053 COMPREHEN METABOLIC PANEL: CPT | Performed by: EMERGENCY MEDICINE

## 2024-01-22 PROCEDURE — 87637 SARSCOV2&INF A&B&RSV AMP PRB: CPT | Performed by: EMERGENCY MEDICINE

## 2024-01-22 PROCEDURE — 83735 ASSAY OF MAGNESIUM: CPT | Performed by: EMERGENCY MEDICINE

## 2024-01-22 PROCEDURE — 250N000009 HC RX 250: Performed by: INTERNAL MEDICINE

## 2024-01-22 PROCEDURE — 120N000013 HC R&B IMCU

## 2024-01-22 PROCEDURE — 5A09357 ASSISTANCE WITH RESPIRATORY VENTILATION, LESS THAN 24 CONSECUTIVE HOURS, CONTINUOUS POSITIVE AIRWAY PRESSURE: ICD-10-PCS | Performed by: EMERGENCY MEDICINE

## 2024-01-22 PROCEDURE — 250N000009 HC RX 250: Performed by: EMERGENCY MEDICINE

## 2024-01-22 PROCEDURE — 94660 CPAP INITIATION&MGMT: CPT

## 2024-01-22 PROCEDURE — 250N000011 HC RX IP 250 OP 636: Performed by: EMERGENCY MEDICINE

## 2024-01-22 PROCEDURE — 85004 AUTOMATED DIFF WBC COUNT: CPT | Performed by: EMERGENCY MEDICINE

## 2024-01-22 PROCEDURE — 999N000156 HC STATISTIC RCP CONSULT EA 30 MIN

## 2024-01-22 PROCEDURE — 120N000001 HC R&B MED SURG/OB

## 2024-01-22 PROCEDURE — 99285 EMERGENCY DEPT VISIT HI MDM: CPT | Mod: 25

## 2024-01-22 PROCEDURE — 94640 AIRWAY INHALATION TREATMENT: CPT | Mod: 76

## 2024-01-22 PROCEDURE — 94640 AIRWAY INHALATION TREATMENT: CPT

## 2024-01-22 PROCEDURE — 93005 ELECTROCARDIOGRAM TRACING: CPT

## 2024-01-22 PROCEDURE — 99223 1ST HOSP IP/OBS HIGH 75: CPT | Mod: AI | Performed by: INTERNAL MEDICINE

## 2024-01-22 RX ORDER — LIDOCAINE 40 MG/G
CREAM TOPICAL
Status: DISCONTINUED | OUTPATIENT
Start: 2024-01-22 | End: 2024-02-14 | Stop reason: HOSPADM

## 2024-01-22 RX ORDER — CALCIUM CARBONATE 500 MG/1
1000 TABLET, CHEWABLE ORAL 4 TIMES DAILY PRN
Status: DISCONTINUED | OUTPATIENT
Start: 2024-01-22 | End: 2024-02-02

## 2024-01-22 RX ORDER — PAROXETINE 20 MG/1
20 TABLET, FILM COATED ORAL DAILY
Status: DISCONTINUED | OUTPATIENT
Start: 2024-01-23 | End: 2024-01-28

## 2024-01-22 RX ORDER — PROCHLORPERAZINE MALEATE 10 MG
10 TABLET ORAL EVERY 6 HOURS PRN
Status: DISCONTINUED | OUTPATIENT
Start: 2024-01-22 | End: 2024-01-25

## 2024-01-22 RX ORDER — CLONAZEPAM 0.5 MG/1
0.5 TABLET ORAL DAILY
Status: DISCONTINUED | OUTPATIENT
Start: 2024-01-23 | End: 2024-01-28

## 2024-01-22 RX ORDER — ACETAMINOPHEN 500 MG
500 TABLET ORAL EVERY 4 HOURS PRN
Status: DISCONTINUED | OUTPATIENT
Start: 2024-01-22 | End: 2024-01-28

## 2024-01-22 RX ORDER — AZITHROMYCIN 500 MG/5ML
500 INJECTION, POWDER, LYOPHILIZED, FOR SOLUTION INTRAVENOUS EVERY 24 HOURS
Status: CANCELLED | OUTPATIENT
Start: 2024-01-23

## 2024-01-22 RX ORDER — LEVOFLOXACIN 250 MG/1
750 TABLET, FILM COATED ORAL DAILY
Status: DISCONTINUED | OUTPATIENT
Start: 2024-01-22 | End: 2024-01-24

## 2024-01-22 RX ORDER — NITROGLYCERIN 0.4 MG/1
0.4 TABLET SUBLINGUAL EVERY 5 MIN PRN
Status: DISCONTINUED | OUTPATIENT
Start: 2024-01-22 | End: 2024-02-14 | Stop reason: HOSPADM

## 2024-01-22 RX ORDER — LOSARTAN POTASSIUM 25 MG/1
25 TABLET ORAL DAILY
Status: DISCONTINUED | OUTPATIENT
Start: 2024-01-23 | End: 2024-02-13

## 2024-01-22 RX ORDER — CARBOXYMETHYLCELLULOSE SODIUM 5 MG/ML
1 SOLUTION/ DROPS OPHTHALMIC
Status: DISCONTINUED | OUTPATIENT
Start: 2024-01-22 | End: 2024-01-22

## 2024-01-22 RX ORDER — AMOXICILLIN 250 MG
2 CAPSULE ORAL 2 TIMES DAILY PRN
Status: DISCONTINUED | OUTPATIENT
Start: 2024-01-22 | End: 2024-01-28

## 2024-01-22 RX ORDER — ONDANSETRON 2 MG/ML
4 INJECTION INTRAMUSCULAR; INTRAVENOUS EVERY 6 HOURS PRN
Status: DISCONTINUED | OUTPATIENT
Start: 2024-01-22 | End: 2024-02-14 | Stop reason: HOSPADM

## 2024-01-22 RX ORDER — METHYLPREDNISOLONE SODIUM SUCCINATE 125 MG/2ML
60 INJECTION, POWDER, LYOPHILIZED, FOR SOLUTION INTRAMUSCULAR; INTRAVENOUS EVERY 12 HOURS
Status: COMPLETED | OUTPATIENT
Start: 2024-01-23 | End: 2024-01-23

## 2024-01-22 RX ORDER — PROCHLORPERAZINE 25 MG
25 SUPPOSITORY, RECTAL RECTAL EVERY 12 HOURS PRN
Status: DISCONTINUED | OUTPATIENT
Start: 2024-01-22 | End: 2024-01-25

## 2024-01-22 RX ORDER — IPRATROPIUM BROMIDE AND ALBUTEROL SULFATE 2.5; .5 MG/3ML; MG/3ML
3 SOLUTION RESPIRATORY (INHALATION) ONCE
Status: COMPLETED | OUTPATIENT
Start: 2024-01-22 | End: 2024-01-22

## 2024-01-22 RX ORDER — CARBOXYMETHYLCELLULOSE SODIUM 5 MG/ML
1 SOLUTION/ DROPS OPHTHALMIC
Status: DISCONTINUED | OUTPATIENT
Start: 2024-01-22 | End: 2024-02-14 | Stop reason: HOSPADM

## 2024-01-22 RX ORDER — AMIODARONE HYDROCHLORIDE 200 MG/1
200 TABLET ORAL DAILY
Status: DISCONTINUED | OUTPATIENT
Start: 2024-01-23 | End: 2024-01-28

## 2024-01-22 RX ORDER — AMOXICILLIN 250 MG
1 CAPSULE ORAL 2 TIMES DAILY PRN
Status: DISCONTINUED | OUTPATIENT
Start: 2024-01-22 | End: 2024-01-28

## 2024-01-22 RX ORDER — LIDOCAINE 40 MG/G
CREAM TOPICAL
Status: DISCONTINUED | OUTPATIENT
Start: 2024-01-22 | End: 2024-01-25

## 2024-01-22 RX ORDER — ALBUTEROL SULFATE 90 UG/1
2 AEROSOL, METERED RESPIRATORY (INHALATION) EVERY 4 HOURS PRN
Status: DISCONTINUED | OUTPATIENT
Start: 2024-01-22 | End: 2024-02-14 | Stop reason: HOSPADM

## 2024-01-22 RX ORDER — AZITHROMYCIN 500 MG/5ML
500 INJECTION, POWDER, LYOPHILIZED, FOR SOLUTION INTRAVENOUS ONCE
Status: COMPLETED | OUTPATIENT
Start: 2024-01-22 | End: 2024-01-22

## 2024-01-22 RX ORDER — IPRATROPIUM BROMIDE AND ALBUTEROL SULFATE 2.5; .5 MG/3ML; MG/3ML
3 SOLUTION RESPIRATORY (INHALATION)
Status: DISCONTINUED | OUTPATIENT
Start: 2024-01-23 | End: 2024-01-26

## 2024-01-22 RX ORDER — IPRATROPIUM BROMIDE AND ALBUTEROL SULFATE 2.5; .5 MG/3ML; MG/3ML
3 SOLUTION RESPIRATORY (INHALATION)
Status: DISCONTINUED | OUTPATIENT
Start: 2024-01-22 | End: 2024-01-22

## 2024-01-22 RX ORDER — METHYLPREDNISOLONE SODIUM SUCCINATE 125 MG/2ML
125 INJECTION, POWDER, LYOPHILIZED, FOR SOLUTION INTRAMUSCULAR; INTRAVENOUS ONCE
Status: COMPLETED | OUTPATIENT
Start: 2024-01-22 | End: 2024-01-22

## 2024-01-22 RX ORDER — ALBUTEROL SULFATE 0.83 MG/ML
2.5 SOLUTION RESPIRATORY (INHALATION) EVERY 4 HOURS PRN
Status: DISCONTINUED | OUTPATIENT
Start: 2024-01-22 | End: 2024-02-14 | Stop reason: HOSPADM

## 2024-01-22 RX ORDER — ATORVASTATIN CALCIUM 20 MG/1
20 TABLET, FILM COATED ORAL DAILY
Status: DISCONTINUED | OUTPATIENT
Start: 2024-01-23 | End: 2024-01-28

## 2024-01-22 RX ORDER — ONDANSETRON 4 MG/1
4 TABLET, ORALLY DISINTEGRATING ORAL EVERY 6 HOURS PRN
Status: DISCONTINUED | OUTPATIENT
Start: 2024-01-22 | End: 2024-02-14 | Stop reason: HOSPADM

## 2024-01-22 RX ORDER — LEVOTHYROXINE SODIUM 112 UG/1
112 TABLET ORAL DAILY
Status: DISCONTINUED | OUTPATIENT
Start: 2024-01-23 | End: 2024-01-28

## 2024-01-22 RX ADMIN — METHYLPREDNISOLONE SODIUM SUCCINATE 125 MG: 125 INJECTION, POWDER, FOR SOLUTION INTRAMUSCULAR; INTRAVENOUS at 15:36

## 2024-01-22 RX ADMIN — APIXABAN 5 MG: 5 TABLET, FILM COATED ORAL at 20:21

## 2024-01-22 RX ADMIN — ACETAMINOPHEN 500 MG: 500 TABLET, FILM COATED ORAL at 20:35

## 2024-01-22 RX ADMIN — IPRATROPIUM BROMIDE AND ALBUTEROL SULFATE 3 ML: .5; 3 SOLUTION RESPIRATORY (INHALATION) at 15:05

## 2024-01-22 RX ADMIN — LEVOFLOXACIN 750 MG: 250 TABLET, FILM COATED ORAL at 20:21

## 2024-01-22 RX ADMIN — IPRATROPIUM BROMIDE AND ALBUTEROL SULFATE 3 ML: .5; 3 SOLUTION RESPIRATORY (INHALATION) at 19:30

## 2024-01-22 RX ADMIN — IPRATROPIUM BROMIDE AND ALBUTEROL SULFATE 3 ML: .5; 3 SOLUTION RESPIRATORY (INHALATION) at 14:55

## 2024-01-22 RX ADMIN — AZITHROMYCIN MONOHYDRATE 500 MG: 500 INJECTION, POWDER, LYOPHILIZED, FOR SOLUTION INTRAVENOUS at 15:36

## 2024-01-22 ASSESSMENT — ACTIVITIES OF DAILY LIVING (ADL)
ADLS_ACUITY_SCORE: 20
ADLS_ACUITY_SCORE: 20
ADLS_ACUITY_SCORE: 35
ADLS_ACUITY_SCORE: 35
ADLS_ACUITY_SCORE: 20

## 2024-01-22 NOTE — ED PROVIDER NOTES
History     Chief Complaint:  Wheezing       HPI   Lara Melendez is a 58 year old female brought in by ambulance on Eliquis for atrial fibrillation with a history of hypertension here for evaluation of wheezing. Patient reports onset of wheezing starting three days ago.   Also has some diaphoresis as she is working much harder to breathe. Since then, it has progressively worsened. Today she used her inhaler without relief. EMS found patient to be 89% on RA with audible wheezing. Increased to 94% on 3L nasal cannula. Wheezing improved with duo neb. She also has a wet cough today, sputum is green. She has COPD. She is not on oxygen at home. She does have a prescription for duo nebs but has not picked it up yet. Patient denies fever, chills, abdominal pain, hemoptysis    Independent Historian:   None - Patient Only    Review of External Notes:   Discharge summary from 1/10/2024 reviewed.  She was admitted for COPD exacerbation.    Medications:   Albuterol inhaler   Eliquis   Deltasone   Lipitor   Klonopin   Breo ellipta inhaler   Xopenex   Levothyroxine   Paxil     Past Medical History:    Anxiety   COPD  Diverticulisis  Hypercholesterolemia   Hypertension   Hypothyroidism  Atrial fibrillation   Psoriasis   Pulmonary nodule  Macrocytosis   Tobacco use disorder      Past Surgical History:    Cholecystectomy   Vocal cord surgery   Incision and drainage mandible        Physical Exam   Patient Vitals for the past 24 hrs:   BP Temp Temp src Pulse Resp SpO2   01/22/24 1636 (!) 160/100 -- -- 93 24 100 %   01/22/24 1621 (!) 145/98 -- -- 92 24 97 %   01/22/24 1606 (!) 151/100 -- -- 95 16 98 %   01/22/24 1501 -- 97.9  F (36.6  C) Oral -- -- --   01/22/24 1450 (!) 157/113 -- -- -- -- --   01/22/24 1430 -- -- -- 95 28 94 %        Physical Exam    General: Laying on the ED bed  HEENT: Normocephalic, atraumatic  Cardiac: Warm and well perfused, regular rate and rhythm  Pulm: Moderate conversational dyspnea, prolonged expiratory  phase, diffuse expiratory wheeze and rhonchi, accessory muscle use use and moderately increased work of breathing without respiratory distress  GI: Abdomen soft, nontender, no rigidity or guarding  MSK: No bony deformities  Skin: Warm and dry  Neuro: Moves all extremities  Psych: Pleasant mood and affect          Emergency Department Course   ECG  ECG taken at 1549, ECG read at 1609  Sinus rhythm   T wave abnormality, consider lateral ischemia    No significant change as compared to prior, dated 01/09/2024.  Rate 95 bpm. ME interval 170 ms. QRS duration 70 ms. QT/QTc 352/442 ms. P-R-T axes 70 53 266.     Imaging:  XR Chest Port 1 View   Final Result   IMPRESSION: No acute disease.       GHAZALA ORELLANA MD            SYSTEM ID:  [id number]             Laboratory:  Labs Ordered and Resulted from Time of ED Arrival to Time of ED Departure   COMPREHENSIVE METABOLIC PANEL - Abnormal       Result Value    Sodium 138      Potassium 4.3      Carbon Dioxide (CO2) 25      Anion Gap 13      Urea Nitrogen 8.2      Creatinine 0.93      GFR Estimate 70      Calcium 9.3      Chloride 100      Glucose 126 (*)     Alkaline Phosphatase 80      AST 27      ALT 40      Protein Total 7.2      Albumin 4.4      Bilirubin Total 0.3     ISTAT GASES LACTATE VENOUS POCT - Abnormal    Lactic Acid POCT 1.0      Bicarbonate Venous POCT 27      O2 Sat, Venous POCT 98 (*)     pCO2 Venous POCT 42      pH Venous POCT 7.42      pO2 Venous POCT 103 (*)    MAGNESIUM - Normal    Magnesium 2.2     INFLUENZA A/B, RSV, & SARS-COV2 PCR - Normal    Influenza A PCR Negative      Influenza B PCR Negative      RSV PCR Negative      SARS CoV2 PCR Negative     CBC WITH PLATELETS AND DIFFERENTIAL    WBC Count 10.3      RBC Count 4.56      Hemoglobin 15.0      Hematocrit 44.6      MCV 98      MCH 32.9      MCHC 33.6      RDW 14.3      Platelet Count 201      % Neutrophils 66      % Lymphocytes 20      % Monocytes 8      % Eosinophils 5      % Basophils 0      %  Immature Granulocytes 1      NRBCs per 100 WBC 0      Absolute Neutrophils 6.9      Absolute Lymphocytes 2.0      Absolute Monocytes 0.8      Absolute Eosinophils 0.5      Absolute Basophils 0.0      Absolute Immature Granulocytes 0.1      Absolute NRBCs 0.0     BLOOD GAS ARTERIAL        Procedures   None    Emergency Department Course & Assessments:    Interventions:  Medications   No lozenges or gum should be given while patient on BIPAP/AVAPS/AVAPS AE (has no administration in time range)   carboxymethylcellulose PF (REFRESH PLUS) 0.5 % ophthalmic solution 1 drop (has no administration in time range)   Patient may continue current oral medications (has no administration in time range)   ipratropium - albuterol 0.5 mg/2.5 mg/3 mL (DUONEB) neb solution 3 mL (3 mLs Nebulization $Given 24 1455)   ipratropium - albuterol 0.5 mg/2.5 mg/3 mL (DUONEB) neb solution 3 mL (3 mLs Nebulization $Given 24 1505)   methylPREDNISolone sodium succinate (solu-MEDROL) injection 125 mg (125 mg Intravenous $Given 24 1536)   azithromycin 500 mg (ZITHROMAX) in 0.9% NaCl 250 mL intermittent infusion 500 mg (500 mg Intravenous $New Bag 24 1536)      Independent Interpretation (X-rays, CTs, rhythm strip):  Chest x-ray on my review is clear without lobar infiltrate, pneumothorax, large pleural effusion, or significant edema.     Assessments/Consultations/Discussion of Management or Tests:   ED Course as of 24 1711      7139 I obtained history and examined the patient as noted above.        Social Determinants of Health affecting care:   None    Disposition:  The patient was admitted to the hospital under the care of Dr. Jeter.     Impression & Plan        Medical Decision Makin-year-old female presents with hypoxemia and wheezing from home, history of COPD.  Hypoxemia has corrected with supplemental oxygen prior to my evaluation.  Patient does still have quite a bit of wheezing after nearly  completing 3 DuoNeb treatments.  Given her work of breathing and prolonged expiratory phase, complains of starting to feel tired, BiPAP was initiated.  Blood gas shows no acute hypoventilation.  Chest x-ray shows no lobar pneumonia or other acute findings.  Patient was given azithromycin and Solu-Medrol for her COPD exacerbation and increased green sputum production on history.  Patient was much more comfortable on BiPAP with diffuse persistent wheezing on reevaluation.  Plan is for admission to the hospitalist service for further care.         Diagnosis:    ICD-10-CM    1. COPD exacerbation (H)  J44.1       2. Acute respiratory failure with hypoxia (H)  J96.01              Scribe Disclosure:  I, Eileen Doe, am serving as a scribe at 3:18 PM on 1/22/2024 to document services personally performed by Serg Whittaker MD based on my observations and the provider's statements to me.   1/22/2024   Serg Whittaker MD King, Colin, MD  01/22/24 1247

## 2024-01-22 NOTE — ED TRIAGE NOTES
"SCAR for wheezing and productive cough starting today. Started feeling \"unwell\" on Friday progressively worsening until wheezing without relief of inhaler today. Fire found pt to be 89% on RA with audible wheezing. Wheezing improved with duo neb. 94% on 3LNC. Pt with COPD no O2 at baseline. States she had a script for duo nebs but has been unable to pick it up. ABCs intact tachypnic but no audible wheezing a this time. A&x4        "

## 2024-01-22 NOTE — ED NOTES
Bed: [facility]  Expected date:   Expected time:   Means of arrival:   Comments:  Jonna Cisneros2-productive cough, COPD

## 2024-01-22 NOTE — PHARMACY-ADMISSION MEDICATION HISTORY
Pharmacist Admission Medication History    Admission medication history is complete. The information provided in this note is only as accurate as the sources available at the time of the update.    Information Source(s): Patient, Hospital records, and CareEverywhere/SureScripts via in-person    Pertinent Information: Just finished her prednisone taper 2 days ago.     Changes made to PTA medication list:  Added: None  Deleted: Prednisone taper  Changed: None        Allergies reviewed with patient and updates made in EHR: no    Medication History Completed By: Andriy Villegas [facility] 1/22/2024 5:27 PM    PTA Med List   Medication Sig Last Dose    albuterol (PROAIR HFA/PROVENTIL HFA/VENTOLIN HFA) 108 (90 Base) MCG/ACT inhaler Inhale 2 puffs into the lungs every 4 hours as needed for shortness of breath / dyspnea or wheezing Inhale 1-2 Puffs every 4 hours as needed for Wheezing. Unknown at prn    albuterol (PROVENTIL) (2.5 MG/3ML) 0.083% neb solution Take 1 vial (2.5 mg) by nebulization every 4 hours as needed for shortness of breath or wheezing Unknown at prn    amiodarone (PACERONE) 200 MG tablet 1 tablet (200 mg) by Oral or Feeding Tube route daily (Patient taking differently: Take 200 mg by mouth daily) 1/22/2024 at am    apixaban ANTICOAGULANT (ELIQUIS) 5 MG tablet Take 1 tablet (5 mg) by mouth 2 times daily 1/22/2024 at am    atorvastatin (LIPITOR) 20 MG tablet Take 20 mg by mouth daily 1/22/2024 at am    clonazePAM (KLONOPIN) 0.5 MG tablet Take 0.5 mg by mouth daily 1/22/2024 at am    fluticasone-vilanterol (BREO ELLIPTA) 200-25 MCG/ACT inhaler Inhale 1 Dose into the lungs daily 1/22/2024 at am    ipratropium - albuterol 0.5 mg/2.5 mg/3 mL (DUONEB) 0.5-2.5 (3) MG/3ML neb solution Take 1 vial (3 mLs) by nebulization every 6 hours as needed for shortness of breath / dyspnea or wheezing Unknown at prn    levalbuterol (XOPENEX) 0.63 MG/3ML neb solution Inhale 0.63 mg into the lungs every 4 hours as needed Unknown at prn     levothyroxine (SYNTHROID/LEVOTHROID) 112 MCG tablet Take 112 mcg by mouth daily 1/22/2024 at am    losartan (COZAAR) 25 MG tablet Take 1 tablet by mouth daily 1/22/2024 at am    sodium chloride (NEBUSAL) 3 % neb solution Inhale 4 mLs into the lungs 2 times daily 1/22/2024 at am

## 2024-01-22 NOTE — ED NOTES
[facility]  ED Nurse Handoff Report    ED Chief complaint: Wheezing  . ED Diagnosis:   Final diagnoses:   COPD exacerbation (H)   Acute respiratory failure with hypoxia (H)       Allergies:   Allergies   Allergen Reactions    Sulfa Antibiotics     Doxycycline Other (See Comments)     Develops chest tightness  Intolerance not allergy    Penicillins Rash     Generalized rash  Rash, Generalized         Code Status: Full Code    Activity level - Baseline/Home:  standby.  Activity Level - Current:   in bed.   Lift room needed: No.   Bariatric: No   Needed: No   Isolation: No.   Infection: Not Applicable.     Respiratory status: BiPap    Vital Signs (within 30 minutes):   Vitals:    01/22/24 1621 01/22/24 1636 01/22/24 1700 01/22/24 1715   BP: (!) 145/98 (!) 160/100 (!) 131/93 (!) 129/90   Pulse: 92 93 90 90   Resp: 24 24 22 21   Temp:       TempSrc:       SpO2: 97% 100% 98% 99%       Cardiac Rhythm:  ,      Pain level:    Patient confused: No.   Patient Falls Risk: patient and family education, assistive device/personal items within reach, and room door open.   Elimination Status: Has voided     Patient Report - Initial Complaint: wheezing/SOB.   Lara Melendez is a 58 year old female brought in by ambulance on Eliis for atrial fibrillation with a history of hypertension here for evaluation of wheezing. Patient reports onset of wheezing starting three days ago.   Also has some diaphoresis as she is working much harder to breathe. Since then, it has progressively worsened. Today she used her inhaler without relief. EMS found patient to be 89% on RA with audible wheezing. Increased to 94% on 3L nasal cannula. Wheezing improved with duo neb. She also has a wet cough today, sputum is green. She has COPD. She is not on oxygen at home. She does have a prescription for duo nebs but has not picked it up yet. Patient denies fever, chills, abdominal pain, hemoptysis   Focused Assessment:    Respiratory  RD      Respiratory WDLRespiratory WDL: .WDL except (wheezes noted, on BiPap w/ relief in WOB.)           Abnormal Results:   Labs Ordered and Resulted from Time of ED Arrival to Time of ED Departure   COMPREHENSIVE METABOLIC PANEL - Abnormal       Result Value    Sodium 138      Potassium 4.3      Carbon Dioxide (CO2) 25      Anion Gap 13      Urea Nitrogen 8.2      Creatinine 0.93      GFR Estimate 70      Calcium 9.3      Chloride 100      Glucose 126 (*)     Alkaline Phosphatase 80      AST 27      ALT 40      Protein Total 7.2      Albumin 4.4      Bilirubin Total 0.3     ISTAT GASES LACTATE VENOUS POCT - Abnormal    Lactic Acid POCT 1.0      Bicarbonate Venous POCT 27      O2 Sat, Venous POCT 98 (*)     pCO2 Venous POCT 42      pH Venous POCT 7.42      pO2 Venous POCT 103 (*)    MAGNESIUM - Normal    Magnesium 2.2     INFLUENZA A/B, RSV, & SARS-COV2 PCR - Normal    Influenza A PCR Negative      Influenza B PCR Negative      RSV PCR Negative      SARS CoV2 PCR Negative     CBC WITH PLATELETS AND DIFFERENTIAL    WBC Count 10.3      RBC Count 4.56      Hemoglobin 15.0      Hematocrit 44.6      MCV 98      MCH 32.9      MCHC 33.6      RDW 14.3      Platelet Count 201      % Neutrophils 66      % Lymphocytes 20      % Monocytes 8      % Eosinophils 5      % Basophils 0      % Immature Granulocytes 1      NRBCs per 100 WBC 0      Absolute Neutrophils 6.9      Absolute Lymphocytes 2.0      Absolute Monocytes 0.8      Absolute Eosinophils 0.5      Absolute Basophils 0.0      Absolute Immature Granulocytes 0.1      Absolute NRBCs 0.0     BLOOD GAS ARTERIAL        XR Chest Port 1 View   Final Result   IMPRESSION: No acute disease.       GHAZALA ORELLANA MD            SYSTEM ID:  [id number]          Treatments provided: labs, imaging, O2, see MAR  Family Comments: N/A  OBS brochure/video discussed/provided to patient:  N/A  ED Medications:   Medications   No lozenges or gum should be given while patient on  BIPAP/AVAPS/AVAPS AE (has no administration in time range)   carboxymethylcellulose PF (REFRESH PLUS) 0.5 % ophthalmic solution 1 drop (has no administration in time range)   Patient may continue current oral medications (has no administration in time range)   ipratropium - albuterol 0.5 mg/2.5 mg/3 mL (DUONEB) neb solution 3 mL (3 mLs Nebulization $Given 1/22/24 1455)   ipratropium - albuterol 0.5 mg/2.5 mg/3 mL (DUONEB) neb solution 3 mL (3 mLs Nebulization $Given 1/22/24 1505)   methylPREDNISolone sodium succinate (solu-MEDROL) injection 125 mg (125 mg Intravenous $Given 1/22/24 1536)   azithromycin 500 mg (ZITHROMAX) in 0.9% NaCl 250 mL intermittent infusion 500 mg (500 mg Intravenous $New Bag 1/22/24 1536)       Drips infusing:  No  For the majority of the shift this patient was Green.   Interventions performed were N/A.    Sepsis treatment initiated: No    Cares/treatment/interventions/medications to be completed following ED care: N/A    ED Nurse Name: Taylor Hannon RN  5:32 PM     RECEIVING UNIT ED HANDOFF REVIEW    Above ED Nurse Handoff Report was reviewed: Yes  Reviewed by: Tamara Alfonso RN on January 22, 2024 at 5:56 PM

## 2024-01-23 LAB
ATRIAL RATE - MUSE: 95 BPM
DIASTOLIC BLOOD PRESSURE - MUSE: NORMAL MMHG
INTERPRETATION ECG - MUSE: NORMAL
P AXIS - MUSE: 70 DEGREES
PR INTERVAL - MUSE: 170 MS
PROCALCITONIN SERPL IA-MCNC: 0.03 NG/ML
QRS DURATION - MUSE: 70 MS
QT - MUSE: 352 MS
QTC - MUSE: 442 MS
R AXIS - MUSE: 53 DEGREES
SYSTOLIC BLOOD PRESSURE - MUSE: NORMAL MMHG
T AXIS - MUSE: 266 DEGREES
VENTRICULAR RATE- MUSE: 95 BPM

## 2024-01-23 PROCEDURE — 120N000013 HC R&B IMCU

## 2024-01-23 PROCEDURE — 250N000009 HC RX 250: Performed by: INTERNAL MEDICINE

## 2024-01-23 PROCEDURE — 36415 COLL VENOUS BLD VENIPUNCTURE: CPT | Performed by: HOSPITALIST

## 2024-01-23 PROCEDURE — 94660 CPAP INITIATION&MGMT: CPT

## 2024-01-23 PROCEDURE — 999N000157 HC STATISTIC RCP TIME EA 10 MIN

## 2024-01-23 PROCEDURE — 94640 AIRWAY INHALATION TREATMENT: CPT | Mod: 76

## 2024-01-23 PROCEDURE — 250N000013 HC RX MED GY IP 250 OP 250 PS 637: Performed by: INTERNAL MEDICINE

## 2024-01-23 PROCEDURE — 250N000011 HC RX IP 250 OP 636: Performed by: HOSPITALIST

## 2024-01-23 PROCEDURE — 250N000011 HC RX IP 250 OP 636: Performed by: INTERNAL MEDICINE

## 2024-01-23 PROCEDURE — 94640 AIRWAY INHALATION TREATMENT: CPT

## 2024-01-23 PROCEDURE — 84145 PROCALCITONIN (PCT): CPT | Performed by: HOSPITALIST

## 2024-01-23 PROCEDURE — 250N000013 HC RX MED GY IP 250 OP 250 PS 637: Performed by: HOSPITALIST

## 2024-01-23 PROCEDURE — 99232 SBSQ HOSP IP/OBS MODERATE 35: CPT | Performed by: HOSPITALIST

## 2024-01-23 RX ORDER — PREDNISONE 20 MG/1
40 TABLET ORAL DAILY
Status: DISCONTINUED | OUTPATIENT
Start: 2024-01-24 | End: 2024-01-25

## 2024-01-23 RX ORDER — FLUTICASONE FUROATE AND VILANTEROL 200; 25 UG/1; UG/1
1 POWDER RESPIRATORY (INHALATION) DAILY
Status: DISCONTINUED | OUTPATIENT
Start: 2024-01-23 | End: 2024-01-28

## 2024-01-23 RX ADMIN — IPRATROPIUM BROMIDE AND ALBUTEROL SULFATE 3 ML: .5; 3 SOLUTION RESPIRATORY (INHALATION) at 15:31

## 2024-01-23 RX ADMIN — FLUTICASONE FUROATE AND VILANTEROL TRIFENATATE 1 PUFF: 200; 25 POWDER RESPIRATORY (INHALATION) at 11:08

## 2024-01-23 RX ADMIN — APIXABAN 5 MG: 5 TABLET, FILM COATED ORAL at 20:11

## 2024-01-23 RX ADMIN — LEVOTHYROXINE SODIUM 112 MCG: 0.11 TABLET ORAL at 09:09

## 2024-01-23 RX ADMIN — ACETAMINOPHEN 500 MG: 500 TABLET, FILM COATED ORAL at 17:44

## 2024-01-23 RX ADMIN — IPRATROPIUM BROMIDE AND ALBUTEROL SULFATE 3 ML: .5; 3 SOLUTION RESPIRATORY (INHALATION) at 11:53

## 2024-01-23 RX ADMIN — PAROXETINE HYDROCHLORIDE 20 MG: 20 TABLET, FILM COATED ORAL at 09:09

## 2024-01-23 RX ADMIN — METHYLPREDNISOLONE SODIUM SUCCINATE 62.5 MG: 125 INJECTION, POWDER, FOR SOLUTION INTRAMUSCULAR; INTRAVENOUS at 04:26

## 2024-01-23 RX ADMIN — IPRATROPIUM BROMIDE AND ALBUTEROL SULFATE 3 ML: .5; 3 SOLUTION RESPIRATORY (INHALATION) at 19:42

## 2024-01-23 RX ADMIN — ALBUTEROL SULFATE 2.5 MG: 2.5 SOLUTION RESPIRATORY (INHALATION) at 05:04

## 2024-01-23 RX ADMIN — LEVOFLOXACIN 750 MG: 250 TABLET, FILM COATED ORAL at 09:09

## 2024-01-23 RX ADMIN — CLONAZEPAM 0.5 MG: 0.5 TABLET ORAL at 09:09

## 2024-01-23 RX ADMIN — APIXABAN 5 MG: 5 TABLET, FILM COATED ORAL at 09:09

## 2024-01-23 RX ADMIN — AMIODARONE HYDROCHLORIDE 200 MG: 200 TABLET ORAL at 09:09

## 2024-01-23 RX ADMIN — ACETAMINOPHEN 500 MG: 500 TABLET, FILM COATED ORAL at 11:07

## 2024-01-23 RX ADMIN — METHYLPREDNISOLONE SODIUM SUCCINATE 62.5 MG: 125 INJECTION, POWDER, FOR SOLUTION INTRAMUSCULAR; INTRAVENOUS at 17:33

## 2024-01-23 RX ADMIN — ATORVASTATIN CALCIUM 20 MG: 20 TABLET, FILM COATED ORAL at 09:09

## 2024-01-23 RX ADMIN — IPRATROPIUM BROMIDE AND ALBUTEROL SULFATE 3 ML: .5; 3 SOLUTION RESPIRATORY (INHALATION) at 07:46

## 2024-01-23 RX ADMIN — LOSARTAN POTASSIUM 25 MG: 25 TABLET, FILM COATED ORAL at 09:09

## 2024-01-23 ASSESSMENT — ACTIVITIES OF DAILY LIVING (ADL)
ADLS_ACUITY_SCORE: 20
ADLS_ACUITY_SCORE: 28
ADLS_ACUITY_SCORE: 28
ADLS_ACUITY_SCORE: 24
DEPENDENT_IADLS:: INDEPENDENT
ADLS_ACUITY_SCORE: 24
ADLS_ACUITY_SCORE: 24
ADLS_ACUITY_SCORE: 20
ADLS_ACUITY_SCORE: 22
ADLS_ACUITY_SCORE: 28
ADLS_ACUITY_SCORE: 20

## 2024-01-23 NOTE — PLAN OF CARE
"Goal Outcome Evaluation:    Vitals: BP (!) 151/88 (BP Location: Right arm)   Pulse 90   Temp 98  F (36.7  C) (Axillary)   Resp 21   Ht 1.6 m (5' 3\")   Wt 67 kg (147 lb 9.6 oz)   SpO2 98%   BMI 26.15 kg/m        Primary DX: COPD Exacerbation  Orientation: A&OX4, Speech clear  Pertinent:   1) Doesn't like BiPap on very long. Requests it off and alternates with 3L/NC then back to BiPap.   2) On PO Levaquin and IV Solumedrol  3) Nebulizers PRN  4) IMC patient. BiPap encouraged  Pain: C/O Headache rated 7/10. PRN tylenol effective for pain management.  Anticoagulant: Eliquis  Respiratory: LS Exp Wheezes throughout. NP cough.   Cardiac/Tele: SR w/ inverted T wave  Bowel Sounds: +X4Q. ABD soft, non-tender.   GI/: Continent urine. No bm this shift. Colostomy bag CDI. Had bm before this shift.   Skin: WDL  LDA: L PIV SL  Diet: Regular  Activity: SBA d/t BLUE  Education: Educated on bed alarm to remain on and staff will turn it off when they answer call light to assist her to Bathroom for safety. Verbalizes understanding and agrees.  Followed By/Consults: RT  Plan of care was reviewed with: Patient  Overall patient progress stable this shift: Yes  Bed alarm on: yes  Safety checks completed: Yes  Plan: Continue current POC    Cares are clustered at night to promote rest and help reduce falls for the patient.                           " Asthma

## 2024-01-23 NOTE — PROVIDER NOTIFICATION
RCAT Treatment Plan    Patient Score: 12  Patient Acuity: 3    Clinical Indication for Therapy: history of COPD    Therapy Ordered: Duoneb QID/Alb prn, deep breathing and coughing techniques    Education: Deep breathe and cough, when to call for prn treatment    Assessment Summary:        01/22/24 1926   RCAT Assessment   Reason for Assessment COPD   Pulmonary Status 3   Surgical Status 0   Chest X-ray 1   Respiratory Pattern 2   Mental Status 0   Breath Sounds 4   Cough Effectiveness 0   Level of Activity 1   O2 Required for SpO2>=92% 1   Acuity Level (points) 12   Acuity Level  3   Re-eval Interval Guideline Every 3 days   Re-evaluation Date 01/25/24   Clinical Indications/Symptoms   Aerosol Therapy RCAT protocol   Aerosol Therapy Plan   RT Treatment Nebulizer   Anticholinergic/Beta-Andrenergic Agonist Duoneb soln (0.5mg/3mg per 3mL) neb Max 6 doses/24h   Aerosol Treatment Frequency Acuity Level 3: QID/PRN @noc-Mod wheezing/Hx asthma/secretion removal       Dior Barba, RT  1/22/2024

## 2024-01-23 NOTE — PROGRESS NOTES
[facility]    Hospitalist Progress Note      Assessment & Plan   Lara Melendez is a 58 year old female with a past medical history of COPD, atrial fibrillation, chronic anticoagulation with Eliquis, hypothyroidism, hypertension and anxiety with multiple recent admissions for COPD exacerbation who presents with shortness of breath, cough and wheezing.    This is now patient's third admission in about 3-1/2 weeks or so.  She was recently discharged on 1/10/2024 for acute hypoxemic respiratory failure secondary to COPD exacerbation requiring BiPAP therapy rescue.  She was treated with steroids, bronchodilator therapy along with Zithromax.  It was discussed with the patient that secondhand smoke exposure at home may be contributing to her recurrent symptoms.  She was discharged on a prednisone taper.    # Acute hypoxemic respiratory failure secondary to COPD exacerbation: Patient had finished her taper a few days before presentation.  She describes shortness of breath, cough along with wheezing.  She notes that she is trying to come to the hospital earlier when she has the symptoms to prevent intubation as she has had prolonged intubation in the past requiring trach in the summer 2023.  In the ER, patient afebrile and hemodynamically stable though with significant work of breathing with diffuse wheezing.  She was placed on BiPAP therapy.  Chest x-ray without significant infiltrate.  CBC and BMP unremarkable.  Lactic acid within normal limits VBG 7.42/42.  -We will continue IV steroids for tonight then move to oral steroids on 1/24.  Continue scheduled DuoNebs.  Albuterol as needed.  Was started on Levaquin though will check a procalcitonin and if not elevated will stop antibiotics.  -BiPAP as needed.  Discussed with nursing.  Okay for diet as she does seem to be improving.  VBG's are also reassuring against CO2 retention.  -Patient requests reinitiation of her home Breo which has been  ordered.  -She is currently on 3L but still with fairly diffuse wheezing on exam with some tachypnea.    #Paroxysmal afib: Continue home DOAC, amiodarone.   #Hypothyroidism: Continue Synthroid.  #Anxiety: Resume home meds  #Status post diverting colostomy: This was done on 7/12/2023 due to large bowel obstruction.  Patient has colostomy and has good output.    DVT PPX: DOAC  CODE STATUS: FULL  Dispo: Likely 1-2 more days.  She will need closer outpatient follow-up to try to reduce the risk of readmission given this is her third admission in about 3-1/2 weeks.    Tommie Ren MD    Interval History   Assumed care.  Patient known to me from her admission in the summer.  She notes that she is feeling a bit better today.  Still feels wheezy and tight.  Off of BiPAP and on 3 L.  Tolerating diet.  She tells me that she is coming into the hospital earlier when she has symptoms to try to reduce the risk of severe disease given her history of prolonged intubation and tracheostomy.  She denies chest pain.  No fevers or chills.    -Data reviewed today: I reviewed all new labs and imaging results over the last 24 hours.     Physical Exam   Temp: 98  F (36.7  C) Temp src: Oral BP: (!) 152/86 Pulse: 88   Resp: 23 SpO2: 95 % O2 Device: Nasal cannula Oxygen Delivery: 3 LPM  Vitals:    01/22/24 1833   Weight: 67 kg (147 lb 9.6 oz)     Vital Signs with Ranges  Temp:  [97.5  F (36.4  C)-98.7  F (37.1  C)] 98  F (36.7  C)  Pulse:  [78-95] 88  Resp:  [16-30] 23  BP: (122-161)/() 152/86  FiO2 (%):  [30 %] 30 %  SpO2:  [93 %-100 %] 95 %  I/O last 3 completed shifts:  In: 400 [P.O.:400]  Out: 1050 [Urine:1050]    Constitutional: Nontoxic, NAD  HEENT: Normocephalic. MMM, No elevation of JVD noted.   Respiratory: +tachypnea. Bilateral expiratory wheezing with prolonged expiratory phase. Conversational.   Cardiovascular: Regular, no murmur  GI: BS+, NT, ND  Skin/Integumen: WWP, no rash. No edema  Neuro: CNII-XII intact. Moves all  extremities. No tremor. A&Ox3.    Medications    - MEDICATION INSTRUCTIONS -      - MEDICATION INSTRUCTIONS -      - MEDICATION INSTRUCTIONS -      - MEDICATION INSTRUCTIONS -      - MEDICATION INSTRUCTIONS -        amiodarone  200 mg Oral Daily    apixaban ANTICOAGULANT  5 mg Oral BID    atorvastatin  20 mg Oral Daily    clonazePAM  0.5 mg Oral Daily    fluticasone-vilanterol  1 puff Inhalation Daily    ipratropium - albuterol 0.5 mg/2.5 mg/3 mL  3 mL Nebulization 4x daily    levofloxacin  750 mg Oral Daily    levothyroxine  112 mcg Oral Daily    losartan  25 mg Oral Daily    methylPREDNISolone  62.5 mg Intravenous Q12H    PARoxetine  20 mg Oral Daily    sodium chloride (PF)  3 mL Intracatheter Q8H    sodium chloride (PF)  3 mL Intracatheter Q8H       Data   Recent Labs   Lab 01/22/24  1527   WBC 10.3   HGB 15.0   MCV 98         POTASSIUM 4.3   CHLORIDE 100   CO2 25   BUN 8.2   CR 0.93   ANIONGAP 13   EDIN 9.3   *   ALBUMIN 4.4   PROTTOTAL 7.2   BILITOTAL 0.3   ALKPHOS 80   ALT 40   AST 27       Recent Results (from the past 24 hour(s))   XR Chest Port 1 View    Narrative    CHEST ONE VIEW  1/22/2024 3:47 PM     HISTORY: Shortness of breath.    COMPARISON: January 9, 2024      Impression    IMPRESSION: No acute disease.     GHAZALA ORELLANA MD         SYSTEM ID:  [id number]

## 2024-01-23 NOTE — CONSULTS
Care Management Initial Consult    General Information  Assessment completed with: Patient,    Type of CM/SW Visit: Initial Assessment    Primary Care Provider verified and updated as needed: Yes   Readmission within the last 30 days:        Reason for Consult: care coordination/care conference, discharge planning       Communication Assessment  Patient's communication style: spoken language (English or Bilingual)    Hearing Difficulty or Deaf: no   Wear Glasses or Blind: yes    Cognitive  Cognitive/Neuro/Behavioral: WDL                      Living Environment:   People in home: spouse     Current living Arrangements: house      Able to return to prior arrangements: yes       Family/Social Support:  Care provided by: self      Current Resources:   Patient receiving home care services: No     Community Resources:    Equipment currently used at home: colostomy/ostomy supplies  Supplies currently used at home: Oxygen Tubing/Supplies, Nebulizer tubing, Other (ostomy supplies)      Lifestyle & Psychosocial Needs:  Social Determinants of Health     Food Insecurity: Not on file   Depression: Not on file   Housing Stability: Not on file   Tobacco Use: Low Risk  (6/21/2023)    Patient History     Smoking Tobacco Use: Never     Smokeless Tobacco Use: Never     Passive Exposure: Not on file   Financial Resource Strain: Not on file   Alcohol Use: Not on file   Transportation Needs: Not on file   Physical Activity: Not on file   Interpersonal Safety: Not on file   Stress: Not on file   Social Connections: Not on file       Functional Status:  Prior to admission patient needed assistance:   Dependent ADLs:: Independent  Dependent IADLs:: Independent      Additional Information:    BEST consulted for elevated risk score. BEST met with pt at the bedside who shared that she lives with her , daughter and her daughter's two kids in a single family home and does not currently use home care services. She stated she has home oxygen  that she doesn't normally use but goes up to 3LPM through Home [facility]. She has ostomy supplies through [facility]. She reports that she's independent with ADLs and med mgmt at baseline. She drives and states her  can transport for discharge. She is aware and planning to keep her 1/31 appt with her PCP. She denies needs for discharge at this time. Please let us know if any discharge needs arise.     Malena Hernandez RN, BSN  Inpatient Care Coordination  [facility]  [phone]

## 2024-01-23 NOTE — ED NOTES
RT trialed pt off BiPap and deemed acceptable for NC. BiPap brought to pt new room by RT and pt currently on 3L by NC, sats >95%.

## 2024-01-23 NOTE — PROVIDER NOTIFICATION
Paged Dr. Jeter: Mauro RAMOS is showing slight ST depression    MD acknowledged, stating it was on EKG.

## 2024-01-23 NOTE — H&P
[facility]  Hospitalist H&P    Name: Lara Melendez      MRN: [id number]  YOB: 1965    Age: 58 year old  Date of admission: 1/22/2024  Primary care provider: Sudhir Carroll            Assessment and Plan:     Lara Melendez is a 58 year old female with PMH significant for COPD, atrial fibrillation on chronic anticoagulation, hypothyroidism, hypertension, anxiety, colostomy status who presents from home with worsening shortness of breath, and wheezing over the last 3 days and being admitted to the hospital with acute hypoxic respiratory failure secondary to COPD exacerbation.    Recurrent acute hypoxic respiratory failure secondary to COPD exacerbation.  -Patient with recurrent acute hypoxic respiratory failure.  -She was in the hospital and discharged about 12 days ago, at that time treated with Zithromax, steroids, bronchodilators and nebulizer.  -Chest x-ray negative for any acute infiltrate.  -At baseline patient's not on oxygen, but stated she has an oxygen tank at home.  -She has greenish sputum which is changed from prior yellowish sputum.  -Started on Levaquin 750 mg p.o. daily.  -Noted increased risk of tendon rupture with quinolone use with steroid. However patient is allergic to penicillin, sulfa, and doxycycline.  She was already treated with Zithromax and no other good option, and the risk is minimal and at this time would like to cover her for Pseudomonas as well due to greenish sputum.  -Continue BiPAP for now, try to wean her off as able.  -N.p.o. while on BiPAP, regular diet when she comes off the BiPAP.  -Continue bronchodilators and nebulizer.  -VBG is reassuring no sign of CO2 retention.  -Viral tests are negative.  -BiPAP is for increased work of breathing, expect she will come off BiPAP later tonight.  -Ad[place] [facility]    Paroxysmal atrial fibrillation, currently in sinus  -Patient is in sinus rhythm at this time.  -Continue Eliquis, amiodarone.  -Continue  telemonitoring.    Hypothyroidism.  -Continue Synthroid.    Anxiety.  -Resume Klonopin, Paxil when is reviewed by pharmacy.    Status post diverting colostomy:  -This was done on 7/12/2023 due to large bowel obstruction.  -Patient has colostomy and has good output.        Clinically Significant Risk Factors Present on Admission                 # Drug Induced Coagulation Defect: home medication list includes an anticoagulant medication        # Hypertension: Noted on problem list                   Code status: Full code  Admit to inpatient  Prophylaxis: on DOAC    I discussed with patient at length the plan of care.  All her question and concerns addressed          Chief Complaint:     Shortness of breath, wheezing and cough         History of Present Illness:   Lara Melendez is a 58 year old female with PMH significant for COPD, atrial fibrillation on chronic anticoagulation, hypothyroidism, hypertension, anxiety, colostomy status who presents from home with worsening shortness of breath, and wheezing over the last 3 days.  Patient admitted to this hospital for COPD exacerbation on 1/9 to 1/10 at the time she required BiPAP but quickly improved, treated and discharged home with recommendation to continue her inhalers and nebulizer and tapering dose of steroid.  Patient stated she ran out of her DuoNeb though she had a prescription for it and did not pick it.  She was not using it for the last several days.  At that time she was also treated with Zithromax and completed the duration of therapy.   Patient has an oxygen tank at home, but normally does not use oxygen.  After she felt short of breath last night she used it briefly which did not help her much at the time.  Today patient developed diaphoresis, increased work of breathing and wheezing which got worse despite her inhaler use.  EMS was called found patient O2 sat of 89 on room air with audible wheezing.  The sputum was green.  Patient denied any fever,  chills, runny nose or sore throat.  She denied any urinary or bowel habit change.  She also denied any nausea vomiting diarrhea constipation.  No change in appetite.    In ED she was evaluated, on arrival she was on 6 L of oxygen saturating 94% with significant increased work of breathing.  She was placed on BiPAP, given steroids and IV Zithromax and being admitted to [facility] on BiPAP.          History was obtained from my discussion with the patient at the bedside.  I also discussed the case with the ED provider.  The electronic medical record was also reviewed.            Past Medical History:     Past Medical History:   Diagnosis Date    Anxiety     COPD (chronic obstructive pulmonary disease)     Diverticulitis of colon     Hypercholesterolemia     Hypertension     Hypothyroidism     Infection due to 2019 novel coronavirus 05/14/2022    Paroxysmal atrial fibrillation     Psoriasis     Pulmonary nodule - left upper lobe              Past Surgical History:     Past Surgical History:   Procedure Laterality Date    CHOLECYSTECTOMY      COLOSTOMY N/A 7/12/2023    Procedure: OPENING DIVERTING COLOSTOMY;  Surgeon: Jaspal Rashid MD;  Location: [facility] OR    INCISION AND DRAINAGE MANDIBLE, COMBINED Left 01/10/2022    Procedure: INCISION AND DRAINAGE, MANDIBLE;  Surgeon: Jn Feliz DDS;  Location: [facility] OR    INCISION AND DRAINAGE MANDIBLE, COMBINED Left 02/04/2022    Procedure: INCISION AND DRAINAGE, MANDIBLE;  Surgeon: Taylor Ortez DDS;  Location: [facility] OR    TOOTH EXTRACTION      Vocal Cord surgery               Social History:     Social History     Tobacco Use    Smoking status: Never    Smokeless tobacco: Never   Substance Use Topics    Alcohol use: Yes     Comment: occ             Family History:   The family history was fully reviewed and non-contributory in this case.         Allergies:     Allergies   Allergen Reactions    Sulfa Antibiotics     Doxycycline Other (See Comments)     Develops chest  tightness  Intolerance not allergy    Penicillins Rash     Generalized rash  Rash, Generalized               Medications:     Prior to Admission medications    Medication Sig Last Dose Taking? Auth Provider Long Term End Date   albuterol (PROAIR HFA/PROVENTIL HFA/VENTOLIN HFA) 108 (90 Base) MCG/ACT inhaler Inhale 2 puffs into the lungs every 4 hours as needed for shortness of breath / dyspnea or wheezing Inhale 1-2 Puffs every 4 hours as needed for Wheezing. Unknown at prn Yes Mark Olsen DO Yes    albuterol (PROVENTIL) (2.5 MG/3ML) 0.083% neb solution Take 1 vial (2.5 mg) by nebulization every 4 hours as needed for shortness of breath or wheezing Unknown at prn Yes Aftab Malave MD Yes    amiodarone (PACERONE) 200 MG tablet 1 tablet (200 mg) by Oral or Feeding Tube route daily  Patient taking differently: Take 200 mg by mouth daily 1/22/2024 at am Yes Tommie Ren MD Yes    apixaban ANTICOAGULANT (ELIQUIS) 5 MG tablet Take 1 tablet (5 mg) by mouth 2 times daily 1/22/2024 at am Yes Luis Alberto Valentin MD     atorvastatin (LIPITOR) 20 MG tablet Take 20 mg by mouth daily 1/22/2024 at am Yes Reported, Patient Yes    clonazePAM (KLONOPIN) 0.5 MG tablet Take 0.5 mg by mouth daily 1/22/2024 at am Yes Unknown, Entered By History Yes    fluticasone-vilanterol (BREO ELLIPTA) 200-25 MCG/ACT inhaler Inhale 1 Dose into the lungs daily 1/22/2024 at am Yes Unknown, Entered By History     ipratropium - albuterol 0.5 mg/2.5 mg/3 mL (DUONEB) 0.5-2.5 (3) MG/3ML neb solution Take 1 vial (3 mLs) by nebulization every 6 hours as needed for shortness of breath / dyspnea or wheezing Unknown at prn Yes Morales Alva MD Yes    levalbuterol (XOPENEX) 0.63 MG/3ML neb solution Inhale 0.63 mg into the lungs every 4 hours as needed Unknown at prn Yes Unknown, Entered By History     levothyroxine (SYNTHROID/LEVOTHROID) 112 MCG tablet Take 112 mcg by mouth daily 1/22/2024 at am Yes Reported, Patient Yes    losartan  "(COZAAR) 25 MG tablet Take 1 tablet by mouth daily 1/22/2024 at am Yes Unknown, Entered By History No    sodium chloride (NEBUSAL) 3 % neb solution Inhale 4 mLs into the lungs 2 times daily 1/22/2024 at am Yes Unknown, Entered By History     PARoxetine (PAXIL) 20 MG tablet Take 20 mg by mouth daily   Unknown, Entered By History No              Review of Systems:     A Comprehensive greater than 10 system review of systems was carried out.  Pertinent positives and negatives are noted above.  Otherwise negative for contributory information.           Physical Exam:   Blood pressure (!) 154/101, pulse 93, temperature 97.9  F (36.6  C), temperature source Oral, resp. rate 19, SpO2 97%.  Wt Readings from Last 1 Encounters:   01/09/24 66 kg (145 lb 8.1 oz)     Exam:  GENERAL: Awake and alert, comfortable, on BiPAP.  HEENT: Normocephalic, atraumatic. Extraocular movements intact.  CARDIOVASCULAR: S1 and S2 noted, regular.  PULMONARY: Good air entry bilaterally, prolonged expiratory phases and wheezing bilaterally.  ABDOMINAL: Soft, non-tender, non-distended. Bowel sounds normoactive.  Positive colostomy  EXT: No cyanosis or clubbing. No appreciable edema.  NEUR: CN 2-12 grossly intact, no focal neurological deficits.  DERM: No rash, ulcer, bruising, nor jaundice.          Data:   EKG:  Personally reviewed by me.  Sinus rhythm at rate of 95 bpm, nonspecific T wave abnormality in the lateral leads    Laboratory:  Recent Labs   Lab 01/22/24  1527   WBC 10.3   HGB 15.0   HCT 44.6   MCV 98        Recent Labs   Lab 01/22/24  1527      POTASSIUM 4.3   CHLORIDE 100   CO2 25   ANIONGAP 13   *   BUN 8.2   CR 0.93   GFRESTIMATED 70   EDIN 9.3     No results for input(s): \"CULT\" in the last 168 hours.    Imaging:  Recent Results (from the past 24 hour(s))   XR Chest Port 1 View    Narrative    CHEST ONE VIEW  1/22/2024 3:47 PM     HISTORY: Shortness of breath.    COMPARISON: January 9, 2024      Impression    " IMPRESSION: No acute disease.     GHAZALA ORELLANA MD         SYSTEM ID:  [id number]       Suresh Jeter MD  [facility] Hospitalist  [facility] DEQUAN RaineyNicollet [facility] [place] [address]  01/22/2024

## 2024-01-24 LAB
ANION GAP SERPL CALCULATED.3IONS-SCNC: 10 MMOL/L (ref 7–15)
BUN SERPL-MCNC: 16.5 MG/DL (ref 8–23)
CALCIUM SERPL-MCNC: 9.2 MG/DL (ref 8.8–10.2)
CHLORIDE SERPL-SCNC: 100 MMOL/L (ref 98–107)
CREAT SERPL-MCNC: 0.91 MG/DL (ref 0.51–0.95)
DEPRECATED HCO3 PLAS-SCNC: 28 MMOL/L (ref 22–29)
EGFRCR SERPLBLD CKD-EPI 2021: 72 ML/MIN/1.73M2
ERYTHROCYTE [DISTWIDTH] IN BLOOD BY AUTOMATED COUNT: 14.4 % (ref 10–15)
GLUCOSE SERPL-MCNC: 116 MG/DL (ref 70–99)
HCT VFR BLD AUTO: 41 % (ref 35–47)
HGB BLD-MCNC: 13.7 G/DL (ref 11.7–15.7)
MAGNESIUM SERPL-MCNC: 2.2 MG/DL (ref 1.7–2.3)
MCH RBC QN AUTO: 33 PG (ref 26.5–33)
MCHC RBC AUTO-ENTMCNC: 33.4 G/DL (ref 31.5–36.5)
MCV RBC AUTO: 99 FL (ref 78–100)
PLATELET # BLD AUTO: 204 10E3/UL (ref 150–450)
POTASSIUM SERPL-SCNC: 4.4 MMOL/L (ref 3.4–5.3)
RBC # BLD AUTO: 4.15 10E6/UL (ref 3.8–5.2)
SODIUM SERPL-SCNC: 138 MMOL/L (ref 135–145)
WBC # BLD AUTO: 11.5 10E3/UL (ref 4–11)

## 2024-01-24 PROCEDURE — 80048 BASIC METABOLIC PNL TOTAL CA: CPT | Performed by: HOSPITALIST

## 2024-01-24 PROCEDURE — 250N000009 HC RX 250: Performed by: INTERNAL MEDICINE

## 2024-01-24 PROCEDURE — 36415 COLL VENOUS BLD VENIPUNCTURE: CPT | Performed by: HOSPITALIST

## 2024-01-24 PROCEDURE — 94660 CPAP INITIATION&MGMT: CPT

## 2024-01-24 PROCEDURE — 250N000013 HC RX MED GY IP 250 OP 250 PS 637: Performed by: INTERNAL MEDICINE

## 2024-01-24 PROCEDURE — 120N000013 HC R&B IMCU

## 2024-01-24 PROCEDURE — 999N000157 HC STATISTIC RCP TIME EA 10 MIN

## 2024-01-24 PROCEDURE — 250N000012 HC RX MED GY IP 250 OP 636 PS 637: Performed by: HOSPITALIST

## 2024-01-24 PROCEDURE — 120N000001 HC R&B MED SURG/OB

## 2024-01-24 PROCEDURE — 94640 AIRWAY INHALATION TREATMENT: CPT

## 2024-01-24 PROCEDURE — 83735 ASSAY OF MAGNESIUM: CPT | Performed by: HOSPITALIST

## 2024-01-24 PROCEDURE — 94640 AIRWAY INHALATION TREATMENT: CPT | Mod: 76

## 2024-01-24 PROCEDURE — 99232 SBSQ HOSP IP/OBS MODERATE 35: CPT | Performed by: HOSPITALIST

## 2024-01-24 PROCEDURE — 85027 COMPLETE CBC AUTOMATED: CPT | Performed by: HOSPITALIST

## 2024-01-24 RX ADMIN — PAROXETINE HYDROCHLORIDE 20 MG: 20 TABLET, FILM COATED ORAL at 09:17

## 2024-01-24 RX ADMIN — IPRATROPIUM BROMIDE AND ALBUTEROL SULFATE 3 ML: .5; 3 SOLUTION RESPIRATORY (INHALATION) at 20:04

## 2024-01-24 RX ADMIN — LOSARTAN POTASSIUM 25 MG: 25 TABLET, FILM COATED ORAL at 09:16

## 2024-01-24 RX ADMIN — APIXABAN 5 MG: 5 TABLET, FILM COATED ORAL at 09:16

## 2024-01-24 RX ADMIN — CLONAZEPAM 0.5 MG: 0.5 TABLET ORAL at 09:16

## 2024-01-24 RX ADMIN — FLUTICASONE FUROATE AND VILANTEROL TRIFENATATE 1 PUFF: 200; 25 POWDER RESPIRATORY (INHALATION) at 07:18

## 2024-01-24 RX ADMIN — ATORVASTATIN CALCIUM 20 MG: 20 TABLET, FILM COATED ORAL at 09:17

## 2024-01-24 RX ADMIN — IPRATROPIUM BROMIDE AND ALBUTEROL SULFATE 3 ML: .5; 3 SOLUTION RESPIRATORY (INHALATION) at 15:22

## 2024-01-24 RX ADMIN — PREDNISONE 40 MG: 20 TABLET ORAL at 09:17

## 2024-01-24 RX ADMIN — ACETAMINOPHEN 500 MG: 500 TABLET, FILM COATED ORAL at 16:53

## 2024-01-24 RX ADMIN — IPRATROPIUM BROMIDE AND ALBUTEROL SULFATE 3 ML: .5; 3 SOLUTION RESPIRATORY (INHALATION) at 11:27

## 2024-01-24 RX ADMIN — AMIODARONE HYDROCHLORIDE 200 MG: 200 TABLET ORAL at 09:16

## 2024-01-24 RX ADMIN — LEVOFLOXACIN 750 MG: 250 TABLET, FILM COATED ORAL at 09:16

## 2024-01-24 RX ADMIN — IPRATROPIUM BROMIDE AND ALBUTEROL SULFATE 3 ML: .5; 3 SOLUTION RESPIRATORY (INHALATION) at 07:17

## 2024-01-24 RX ADMIN — APIXABAN 5 MG: 5 TABLET, FILM COATED ORAL at 20:56

## 2024-01-24 RX ADMIN — LEVOTHYROXINE SODIUM 112 MCG: 0.11 TABLET ORAL at 09:17

## 2024-01-24 ASSESSMENT — ACTIVITIES OF DAILY LIVING (ADL)
ADLS_ACUITY_SCORE: 28

## 2024-01-24 NOTE — PLAN OF CARE
Goal Outcome Evaluation:      VSS afebrile. On PO Levaquin, Scheduled Nebs and IV steroids changed to Prednisone. Will wean o2 as able, on 2Lo2. Pt changed own colostomy.

## 2024-01-24 NOTE — PLAN OF CARE
"Goal Outcome Evaluation:    Vitals: BP (!) 147/80 (BP Location: Right arm, Patient Position: Semi-Dorsey's, Cuff Size: Adult Regular)   Pulse 83   Temp 99  F (37.2  C) (Oral)   Resp 18   Ht 1.6 m (5' 3\")   Wt 67 kg (147 lb 9.6 oz)   SpO2 96%   BMI 26.15 kg/m        Primary DX: COPD Exacerbation  Orientation: A&OX4. Speech clear  Pertinent: Wears BiPap alternating with 02. Weaning 02. Currently at 2L/NC  Pain: Denies  Anticoagulant: Eliquis  Respiratory: LS I&E wheezes.  Infrequent dry, NP cough  Cardiac/Tele: SA  Bowel Sounds: +X4Q. ABD soft, non-tender.   GI/: Continent urine. No bm.  Colostomy bag CDI  Skin: WDL  LDA: R PIV SL  Diet: Regular  Activity: SBA d/t BLUE  Education: Educated on having bed alarm on for safety and using call light to request assist to BSC. Verbalized understanding.   Followed By/Consults: RT  Plan of care was reviewed with: Patient  Overall patient progress stable this shift: Yes  Bed alarm on: yes  Safety checks completed: Yes  Plan: Continue current POC. Wean 02.     Cares are clustered at night to promote rest and help reduce falls for the patient.                           "

## 2024-01-24 NOTE — PLAN OF CARE
Goal Outcome Evaluation:      Plan of Care Reviewed With: patient  Pt is alert and oriented, SBA with transfers. Colostomy intact, pt able to perform colostomy cares independently.On  3 liters oxygen, sats were  in lower 90s. C/O shortness of breath with activities. On Tele. SR. C/O headache which was relieved with Tylenol.

## 2024-01-24 NOTE — PLAN OF CARE
Goal Outcome Evaluation:    Plan of Care Reviewed With: patient    Overall Patient Progress: improving    A&Ox4. Complains of a headache, gave Tylenol x2. SBA to commode. BLUE, SOB< 3L NC & BiPAP intermittently. LS: wheezes. Tele: SR w/ prolonged QTc and inv Ts, later SR w/ inv Ts. Has ostomy. Incontinent at times. ON IV Solumedrol, switching to PO prednisone in AM. Discharge TBD.

## 2024-01-24 NOTE — PROGRESS NOTES
[facility]    Hospitalist Progress Note      Assessment & Plan   Lara Melendez is a 58 year old female with a past medical history of COPD, atrial fibrillation, chronic anticoagulation with Eliquis, hypothyroidism, hypertension and anxiety with multiple recent admissions for COPD exacerbation who presents with shortness of breath, cough and wheezing.     This is now patient's third admission in about 3-1/2 weeks or so.  She was recently discharged on 1/10/2024 for acute hypoxemic respiratory failure secondary to COPD exacerbation requiring BiPAP therapy rescue.  She was treated with steroids, bronchodilator therapy along with Zithromax.  It was discussed with the patient that secondhand smoke exposure at home may be contributing to her recurrent symptoms.  She was discharged on a prednisone taper.     # Acute hypoxemic respiratory failure secondary to COPD exacerbation: Patient had finished her taper a few days before presentation.  She describes shortness of breath, cough along with wheezing.  She notes that she is trying to come to the hospital earlier when she has the symptoms to prevent intubation as she has had prolonged intubation in the past requiring trach in the summer 2023.  In the ER, patient afebrile and hemodynamically stable though with significant work of breathing with diffuse wheezing.  She was placed on BiPAP therapy.  Chest x-ray without significant infiltrate.  CBC and BMP unremarkable.  Lactic acid within normal limits VBG 7.42/42.  -Pt feeling a bit better on 1/24.  Still SOB with exertion to bathroom.  Saturating in mid-high 90s on 3L which she has available at home.    -Will transition to oral prednisone on 1/24.  Wean O2 as able. Looks like we have room. Keep sats 90%.   - Continue scheduled DuoNebs.  Albuterol as needed.  Was started on Levaquin though will check a procalcitonin and if not elevated will stop antibiotics.  -Didn't need bipap overnight.  -Stopped  levaquin with negative procal and low suspicion for bacterial infx component.   -Patient requests reinitiation of her home Breo which has been ordered.     #Paroxysmal afib: Continue home DOAC, amiodarone.   #Hypothyroidism: Continue Synthroid.  #Anxiety: Resume home meds  #Status post diverting colostomy: This was done on 7/12/2023 due to large bowel obstruction.  Patient has colostomy and has good output.     DVT PPX: DOAC  CODE STATUS: FULL  Dispo: Possibly tomorrow.  She will need closer outpatient follow-up to try to reduce the risk of readmission given this is her third admission in about 3-1/2 weeks.    Tommie Ren MD    Interval History   No events. Feeling a bit better today. Still with wheezing and cough. O2 sats stable. Didn't need bipap overnight. Feels like she needs 1 more day.     -Data reviewed today: I reviewed all new labs and imaging results over the last 24 hours. I personally reviewed     Physical Exam   Temp: 98.8  F (37.1  C) Temp src: Oral BP: 139/79 Pulse: 76   Resp: 18 SpO2: 99 % O2 Device: Nasal cannula with humidification Oxygen Delivery: 2 LPM  Vitals:    01/22/24 1833   Weight: 67 kg (147 lb 9.6 oz)     Vital Signs with Ranges  Temp:  [97.8  F (36.6  C)-99.1  F (37.3  C)] 98.8  F (37.1  C)  Pulse:  [73-97] 76  Resp:  [18-25] 18  BP: (125-148)/(62-89) 139/79  FiO2 (%):  [30 %] 30 %  SpO2:  [96 %-99 %] 99 %  I/O last 3 completed shifts:  In: 1440 [P.O.:1440]  Out: 1350 [Urine:1350]    Constitutional: Nontoxic, NAD  HEENT: Normocephalic. MMM, No elevation of JVD noted.   Respiratory: +improved tachypnea. Bilateral expiratory wheezing with prolonged expiratory phase. Conversational.   Cardiovascular: Regular, no murmur  GI: BS+, NT, ND  Skin/Integumen: WWP, no rash. No edema  Neuro: CNII-XII intact. Moves all extremities. No tremor. A&Ox3.    Medications    - MEDICATION INSTRUCTIONS -      - MEDICATION INSTRUCTIONS -      - MEDICATION INSTRUCTIONS -      - MEDICATION INSTRUCTIONS -      -  MEDICATION INSTRUCTIONS -        amiodarone  200 mg Oral Daily    apixaban ANTICOAGULANT  5 mg Oral BID    atorvastatin  20 mg Oral Daily    clonazePAM  0.5 mg Oral Daily    fluticasone-vilanterol  1 puff Inhalation Daily    ipratropium - albuterol 0.5 mg/2.5 mg/3 mL  3 mL Nebulization 4x daily    levothyroxine  112 mcg Oral Daily    losartan  25 mg Oral Daily    PARoxetine  20 mg Oral Daily    predniSONE  40 mg Oral Daily    sodium chloride (PF)  3 mL Intracatheter Q8H    sodium chloride (PF)  3 mL Intracatheter Q8H       Data   Recent Labs   Lab 01/24/24  0746 01/22/24  1527   WBC 11.5* 10.3   HGB 13.7 15.0   MCV 99 98    201    138   POTASSIUM 4.4 4.3   CHLORIDE 100 100   CO2 28 25   BUN 16.5 8.2   CR 0.91 0.93   ANIONGAP 10 13   EDIN 9.2 9.3   * 126*   ALBUMIN  --  4.4   PROTTOTAL  --  7.2   BILITOTAL  --  0.3   ALKPHOS  --  80   ALT  --  40   AST  --  27       No results found for this or any previous visit (from the past 24 hour(s)).

## 2024-01-25 LAB
ANION GAP SERPL CALCULATED.3IONS-SCNC: 6 MMOL/L (ref 7–15)
BASE EXCESS BLDV CALC-SCNC: 7.4 MMOL/L (ref -3–3)
BUN SERPL-MCNC: 17.5 MG/DL (ref 8–23)
CALCIUM SERPL-MCNC: 8.7 MG/DL (ref 8.8–10.2)
CHLORIDE SERPL-SCNC: 101 MMOL/L (ref 98–107)
CREAT SERPL-MCNC: 0.98 MG/DL (ref 0.51–0.95)
DEPRECATED HCO3 PLAS-SCNC: 31 MMOL/L (ref 22–29)
EGFRCR SERPLBLD CKD-EPI 2021: 66 ML/MIN/1.73M2
GLUCOSE SERPL-MCNC: 94 MG/DL (ref 70–99)
HCO3 BLDV-SCNC: 34 MMOL/L (ref 21–28)
MAGNESIUM SERPL-MCNC: 2.1 MG/DL (ref 1.7–2.3)
O2/TOTAL GAS SETTING VFR VENT: 2 %
OXYHGB MFR BLDV: 82 % (ref 70–75)
PCO2 BLDV: 56 MM HG (ref 40–50)
PH BLDV: 7.4 [PH] (ref 7.32–7.43)
PO2 BLDV: 48 MM HG (ref 25–47)
POTASSIUM SERPL-SCNC: 3.8 MMOL/L (ref 3.4–5.3)
SAO2 % BLDV: 83.3 % (ref 70–75)
SODIUM SERPL-SCNC: 138 MMOL/L (ref 135–145)

## 2024-01-25 PROCEDURE — 999N000156 HC STATISTIC RCP CONSULT EA 30 MIN

## 2024-01-25 PROCEDURE — 120N000001 HC R&B MED SURG/OB

## 2024-01-25 PROCEDURE — 250N000011 HC RX IP 250 OP 636: Performed by: HOSPITALIST

## 2024-01-25 PROCEDURE — G0463 HOSPITAL OUTPT CLINIC VISIT: HCPCS

## 2024-01-25 PROCEDURE — 83735 ASSAY OF MAGNESIUM: CPT | Performed by: HOSPITALIST

## 2024-01-25 PROCEDURE — 250N000012 HC RX MED GY IP 250 OP 636 PS 637: Performed by: HOSPITALIST

## 2024-01-25 PROCEDURE — 250N000013 HC RX MED GY IP 250 OP 250 PS 637: Performed by: INTERNAL MEDICINE

## 2024-01-25 PROCEDURE — 250N000009 HC RX 250: Performed by: HOSPITALIST

## 2024-01-25 PROCEDURE — 999N000157 HC STATISTIC RCP TIME EA 10 MIN

## 2024-01-25 PROCEDURE — 99232 SBSQ HOSP IP/OBS MODERATE 35: CPT | Performed by: HOSPITALIST

## 2024-01-25 PROCEDURE — 250N000013 HC RX MED GY IP 250 OP 250 PS 637: Performed by: HOSPITALIST

## 2024-01-25 PROCEDURE — 36415 COLL VENOUS BLD VENIPUNCTURE: CPT | Performed by: HOSPITALIST

## 2024-01-25 PROCEDURE — 94640 AIRWAY INHALATION TREATMENT: CPT | Mod: 76

## 2024-01-25 PROCEDURE — 250N000009 HC RX 250: Performed by: INTERNAL MEDICINE

## 2024-01-25 PROCEDURE — 80048 BASIC METABOLIC PNL TOTAL CA: CPT | Performed by: HOSPITALIST

## 2024-01-25 PROCEDURE — 82805 BLOOD GASES W/O2 SATURATION: CPT | Performed by: HOSPITALIST

## 2024-01-25 PROCEDURE — 94660 CPAP INITIATION&MGMT: CPT

## 2024-01-25 RX ORDER — SODIUM CHLORIDE FOR INHALATION 3 %
3 VIAL, NEBULIZER (ML) INHALATION
Status: DISCONTINUED | OUTPATIENT
Start: 2024-01-25 | End: 2024-01-30

## 2024-01-25 RX ORDER — IPRATROPIUM BROMIDE AND ALBUTEROL SULFATE 2.5; .5 MG/3ML; MG/3ML
3 SOLUTION RESPIRATORY (INHALATION) EVERY 4 HOURS PRN
Status: DISCONTINUED | OUTPATIENT
Start: 2024-01-25 | End: 2024-02-04

## 2024-01-25 RX ORDER — GUAIFENESIN 600 MG/1
600 TABLET, EXTENDED RELEASE ORAL 2 TIMES DAILY
Status: DISCONTINUED | OUTPATIENT
Start: 2024-01-25 | End: 2024-01-28

## 2024-01-25 RX ORDER — PROCHLORPERAZINE 25 MG
25 SUPPOSITORY, RECTAL RECTAL EVERY 12 HOURS PRN
Status: DISCONTINUED | OUTPATIENT
Start: 2024-01-25 | End: 2024-02-14 | Stop reason: HOSPADM

## 2024-01-25 RX ORDER — METHYLPREDNISOLONE SODIUM SUCCINATE 125 MG/2ML
60 INJECTION, POWDER, LYOPHILIZED, FOR SOLUTION INTRAMUSCULAR; INTRAVENOUS EVERY 12 HOURS
Status: DISCONTINUED | OUTPATIENT
Start: 2024-01-25 | End: 2024-01-27

## 2024-01-25 RX ORDER — SODIUM CHLORIDE FOR INHALATION 3 %
3 VIAL, NEBULIZER (ML) INHALATION
Status: DISCONTINUED | OUTPATIENT
Start: 2024-01-25 | End: 2024-02-14 | Stop reason: HOSPADM

## 2024-01-25 RX ORDER — PROCHLORPERAZINE MALEATE 5 MG
5 TABLET ORAL EVERY 6 HOURS PRN
Status: DISCONTINUED | OUTPATIENT
Start: 2024-01-25 | End: 2024-02-14 | Stop reason: HOSPADM

## 2024-01-25 RX ADMIN — IPRATROPIUM BROMIDE AND ALBUTEROL SULFATE 3 ML: .5; 3 SOLUTION RESPIRATORY (INHALATION) at 08:18

## 2024-01-25 RX ADMIN — PAROXETINE HYDROCHLORIDE 20 MG: 20 TABLET, FILM COATED ORAL at 08:32

## 2024-01-25 RX ADMIN — CLONAZEPAM 0.5 MG: 0.5 TABLET ORAL at 08:32

## 2024-01-25 RX ADMIN — GUAIFENESIN 600 MG: 600 TABLET, EXTENDED RELEASE ORAL at 11:16

## 2024-01-25 RX ADMIN — IPRATROPIUM BROMIDE AND ALBUTEROL SULFATE 3 ML: .5; 3 SOLUTION RESPIRATORY (INHALATION) at 11:20

## 2024-01-25 RX ADMIN — IPRATROPIUM BROMIDE AND ALBUTEROL SULFATE 3 ML: .5; 3 SOLUTION RESPIRATORY (INHALATION) at 15:29

## 2024-01-25 RX ADMIN — IPRATROPIUM BROMIDE AND ALBUTEROL SULFATE 3 ML: .5; 3 SOLUTION RESPIRATORY (INHALATION) at 17:22

## 2024-01-25 RX ADMIN — ATORVASTATIN CALCIUM 20 MG: 20 TABLET, FILM COATED ORAL at 08:32

## 2024-01-25 RX ADMIN — AMIODARONE HYDROCHLORIDE 200 MG: 200 TABLET ORAL at 08:32

## 2024-01-25 RX ADMIN — IPRATROPIUM BROMIDE AND ALBUTEROL SULFATE 3 ML: .5; 3 SOLUTION RESPIRATORY (INHALATION) at 19:28

## 2024-01-25 RX ADMIN — FLUTICASONE FUROATE AND VILANTEROL TRIFENATATE 1 PUFF: 200; 25 POWDER RESPIRATORY (INHALATION) at 08:18

## 2024-01-25 RX ADMIN — GUAIFENESIN 600 MG: 600 TABLET, EXTENDED RELEASE ORAL at 20:51

## 2024-01-25 RX ADMIN — METHYLPREDNISOLONE SODIUM SUCCINATE 62.5 MG: 125 INJECTION, POWDER, FOR SOLUTION INTRAMUSCULAR; INTRAVENOUS at 11:11

## 2024-01-25 RX ADMIN — APIXABAN 5 MG: 5 TABLET, FILM COATED ORAL at 20:51

## 2024-01-25 RX ADMIN — APIXABAN 5 MG: 5 TABLET, FILM COATED ORAL at 08:32

## 2024-01-25 RX ADMIN — LOSARTAN POTASSIUM 25 MG: 25 TABLET, FILM COATED ORAL at 08:32

## 2024-01-25 RX ADMIN — ALBUTEROL SULFATE 2.5 MG: 2.5 SOLUTION RESPIRATORY (INHALATION) at 04:15

## 2024-01-25 RX ADMIN — LEVOTHYROXINE SODIUM 112 MCG: 0.11 TABLET ORAL at 08:32

## 2024-01-25 RX ADMIN — PREDNISONE 40 MG: 20 TABLET ORAL at 08:32

## 2024-01-25 ASSESSMENT — ACTIVITIES OF DAILY LIVING (ADL)
ADLS_ACUITY_SCORE: 28

## 2024-01-25 NOTE — CONSULTS
"[facility] Nurse Inpatient Assessment     Consulted for: Colostomy    Patient History (according to provider note(s):      Lara Melendez is a 58 year old female with a past medical history of COPD, atrial fibrillation, chronic anticoagulation with Eliquis, hypothyroidism, hypertension and anxiety with multiple recent admissions for COPD exacerbation who presents with shortness of breath, cough and wheezing.       Assessment:      Areas visualized during today's visit: Focused:    Assessment of established loop Colostomy:  Diagnosis Pertinent to Stoma: Bowel Obstruction      Surgery Date: 7/12/23  Surgeon:[facility]: [facility]  Pouching system in place on assessment today: Michael one piece, cut to fit, convex, barrier ring, ostomy strips, and ostomy powder   Pouch barrier status: Leaking at 11-12 and 4-8 o clock  Pouch last changed/wear time: <24 hours  Reason for pouch change today: leakage  Effectiveness of current pouching/ supply plan: Ineffective  Change made with ostomy management today: Yes  Pouching system placed today: Sells one piece, cut to fit, convex, barrier ring, ostomy strips, and ostomy powder, belt   Supplies: ordered pouches, rings, barrier extenders, belt  Last photo: 1/25/24    Stoma location: Upper midline  Stoma size: 1 7/8 inches, protrudes 3cm  Stoma appearance: pink-red and prolapsed, os is just above skin level at 11 o'clock  Mucocutaneous junction:  intact  Peristomal complication(s): Moisture-Associated Skin Damage (MASD) due to leakage   Output: thin and brown  Output volume emptied during visit: 0ml  Abdominal assessment: Soft    Face to face time: 20 minutes           Treatment Plan:     Midline Colostomy pouching plan:   Pouching system: ostomy supplies pouches: Michael 55 Cera Plus Soft Convex FECAL ([id number])   Accessories used: [facility] ostomy accessories: 2\" Cera Barrier Ring ([id number]), Powder ([id number]), Large Belt ([id number]), and Cavilon no " sting barrier film ([id number])   Frequency of pouch changes: Three times a week  WOC follow up plan:  1-2 times a week  Bedside RN interventions: Change pouch PRN if leaking using the supplies above, Empty pouch when 1/3 to 1/2 full, ensure to clean pouch outlet after emptying to prevent odor, and Notify WOC for ongoing pouch leakage      Orders: Written    DATA:     Current support surface: Standard  Standard gel/foam mattress (IsoFlex, Atmos air, etc)  Containment of urine/stool: Colostomy pouch  BMI: Body mass index is 26.15 kg/m .   Active diet order: Orders Placed This Encounter      Combination Diet Regular Diet Adult     Output: No intake/output data recorded.     Labs:   Recent Labs   Lab 01/24/24  0746 01/22/24  1527   ALBUMIN  --  4.4   HGB 13.7 15.0   WBC 11.5* 10.3     Pressure injury risk assessment:   Sensory Perception: 4-->no impairment  Moisture: 4-->rarely moist  Activity: 3-->walks occasionally  Mobility: 3-->slightly limited  Nutrition: 3-->adequate  Friction and Shear: 3-->no apparent problem  Lee Score: 20    EDINSON [facility] [employer]  Dept. Office Number: [phone]

## 2024-01-25 NOTE — PLAN OF CARE
Goal Outcome Evaluation:      Plan of Care Reviewed With: patient    Overall Patient Progress: improving    [id number] : Pt A&O, VSS, on 2L NC and at 4am went on Bipap at 30%, Tele SR, getting prednisone and nebs. LS wheezes. Has a colostomy that pt self manages. Patient says it has a small leak and has a towel over it.  She is refusing to take care of it and is refusing my multiple requests to change it for her. Patient is claiming she is too tired and will take care of it later. Probable discharge today.

## 2024-01-25 NOTE — PLAN OF CARE
Goal Outcome Evaluation:    VSS afebrile, on 2Lo2. Completed Levaquin. Pt feeling more Labored with breathing, increased exp wheezes, md notified. On Scheduled Nebs, PO steroids changed back to IV Steroids. Added Mucinex. Pt back on BIPAP this afternoon, md aware. Continue to monitor.  WOCN consulted for Leaking Colostomy, Colostomy changed this morning per WOC nurse.

## 2024-01-25 NOTE — PROGRESS NOTES
Patient currently on BiPAP 12/6 30%  gave prn neb X1. Patient is tolerating it well. RT will continue to monitor and give nebs as ordered and prn.

## 2024-01-25 NOTE — PROGRESS NOTES
[facility]    Hospitalist Progress Note      Assessment & Plan   Lara Melendez is a 58 year old female with a past medical history of COPD, atrial fibrillation, chronic anticoagulation with Eliquis, hypothyroidism, hypertension and anxiety with multiple recent admissions for COPD exacerbation who presents with shortness of breath, cough and wheezing.     This is now patient's third admission in about 3-1/2 weeks or so.  She was recently discharged on 1/10/2024 for acute hypoxemic respiratory failure secondary to COPD exacerbation requiring BiPAP therapy rescue.  She was treated with steroids, bronchodilator therapy along with Zithromax.  It was discussed with the patient that secondhand smoke exposure at home may be contributing to her recurrent symptoms.  She was discharged on a prednisone taper.     # Acute hypoxemic respiratory failure secondary to COPD exacerbation: Patient had finished her taper a few days before presentation.  She describes shortness of breath, cough along with wheezing.  She notes that she is trying to come to the hospital earlier when she has the symptoms to prevent intubation as she has had prolonged intubation in the past requiring trach in the summer 2023.  In the ER, patient afebrile and hemodynamically stable though with significant work of breathing with diffuse wheezing.  She was placed on BiPAP therapy.  Chest x-ray without significant infiltrate.  CBC and BMP unremarkable.  Lactic acid within normal limits VBG 7.42/42.  -Patient showed symptomatic improvement on 1/24 but early this a.m., felt quite short of breath getting to the bathroom.  Wheezy on exam.  VBG checked and stable.  I suspect she needs a bit longer on IV steroids.  Will restart Methylpred 60 mg twice daily.  BiPAP as needed for work of breathing if needed.  Scheduled DuoNebs.  Albuterol as needed.  -Stopped levaquin with negative procal and low suspicion for bacterial infx component.    -Patient requests reinitiation of her home Breo which has been ordered.     #Paroxysmal afib: Continue home DOAC, amiodarone.   #Hypothyroidism: Continue Synthroid.  #Anxiety: Resume home meds  #Status post diverting colostomy: This was done on 7/12/2023 due to large bowel obstruction.  Patient has colostomy and has good output.  WOC consulted     DVT PPX: DOAC  CODE STATUS: FULL  Dispo: Suspect she will be here until Saturday or Sunday given need for BiPAP this morning.    Tommie Ren MD    Interval History   Needed BiPAP this morning for work of breathing.  She is a bit more wheezy.  Still with cough but not bringing much up.  She denies any chest pain.  No abdominal pain.  No nausea or vomiting.    -Data reviewed today: I reviewed all new labs and imaging results over the last 24 hours. I personally reviewed     Physical Exam   Temp: 98.5  F (36.9  C) Temp src: Oral BP: (!) 147/89 Pulse: 75   Resp: 22 SpO2: 96 % O2 Device: Nasal cannula Oxygen Delivery: 2 LPM  Vitals:    01/22/24 1833   Weight: 67 kg (147 lb 9.6 oz)     Vital Signs with Ranges  Temp:  [98.1  F (36.7  C)-98.8  F (37.1  C)] 98.5  F (36.9  C)  Pulse:  [66-92] 75  Resp:  [16-22] 22  BP: (134-158)/(66-89) 147/89  FiO2 (%):  [30 %] 30 %  SpO2:  [94 %-97 %] 96 %  No intake/output data recorded.    Constitutional: Nontoxic, NAD  HEENT: Normocephalic. MMM, No elevation of JVD noted.   Respiratory: +tachypnea. Bilateral expiratory wheezing with prolonged expiratory phase. Conversational.   Cardiovascular: Regular, no murmur  GI: BS+, NT, ND  Skin/Integumen: WWP, no rash. No edema  Neuro: CNII-XII intact. Moves all extremities. No tremor. A&Ox3.    Medications    - MEDICATION INSTRUCTIONS -      - MEDICATION INSTRUCTIONS -      - MEDICATION INSTRUCTIONS -      - MEDICATION INSTRUCTIONS -      - MEDICATION INSTRUCTIONS -        amiodarone  200 mg Oral Daily    apixaban ANTICOAGULANT  5 mg Oral BID    atorvastatin  20 mg Oral Daily    clonazePAM  0.5 mg  Oral Daily    fluticasone-vilanterol  1 puff Inhalation Daily    guaiFENesin  600 mg Oral BID    ipratropium - albuterol 0.5 mg/2.5 mg/3 mL  3 mL Nebulization 4x daily    levothyroxine  112 mcg Oral Daily    losartan  25 mg Oral Daily    methylPREDNISolone  62.5 mg Intravenous Q12H    PARoxetine  20 mg Oral Daily    sodium chloride (PF)  3 mL Intracatheter Q8H    sodium chloride (PF)  3 mL Intracatheter Q8H       Data   Recent Labs   Lab 01/25/24  0750 01/24/24  0746 01/22/24  1527   WBC  --  11.5* 10.3   HGB  --  13.7 15.0   MCV  --  99 98   PLT  --  204 201    138 138   POTASSIUM 3.8 4.4 4.3   CHLORIDE 101 100 100   CO2 31* 28 25   BUN 17.5 16.5 8.2   CR 0.98* 0.91 0.93   ANIONGAP 6* 10 13   EDIN 8.7* 9.2 9.3   GLC 94 116* 126*   ALBUMIN  --   --  4.4   PROTTOTAL  --   --  7.2   BILITOTAL  --   --  0.3   ALKPHOS  --   --  80   ALT  --   --  40   AST  --   --  27       No results found for this or any previous visit (from the past 24 hour(s)).

## 2024-01-25 NOTE — PROGRESS NOTES
"[facility]     Date: 1/25/2024  Admission Dx: COPD  Pulmonary History: COPD  Home Nebulizer/MDI Use:  Home Oxygen: 3 Lpm prn  Acuity Level (RCAT flow sheet): 3  Aerosol Therapy initiated: Continue Duoneb QID and Duoneb or albuterol Q4 hours prn.      Pulmonary Hygiene initiated: Deep breath and cough TID      Volume Expansion initiated: IS TID      Current Oxygen Requirements: NC 2 Lpm  Current SpO2: 94%    Re-evaluation date: 1/28/2024    Patient Education: Discussed effects of nebulizer.      See \"RT Assessments\" flow sheet for patient assessment scoring and Acuity Level Details.           "

## 2024-01-26 ENCOUNTER — APPOINTMENT (OUTPATIENT)
Dept: GENERAL RADIOLOGY | Facility: CLINIC | Age: 59
End: 2024-01-26
Attending: HOSPITALIST
Payer: COMMERCIAL

## 2024-01-26 LAB
ANION GAP SERPL CALCULATED.3IONS-SCNC: 10 MMOL/L (ref 7–15)
BASE EXCESS BLDV CALC-SCNC: 4.6 MMOL/L (ref -3–3)
BASE EXCESS BLDV CALC-SCNC: 6.5 MMOL/L (ref -3–3)
BUN SERPL-MCNC: 19.2 MG/DL (ref 8–23)
CALCIUM SERPL-MCNC: 9.2 MG/DL (ref 8.8–10.2)
CHLORIDE SERPL-SCNC: 101 MMOL/L (ref 98–107)
CREAT SERPL-MCNC: 0.75 MG/DL (ref 0.51–0.95)
DEPRECATED HCO3 PLAS-SCNC: 29 MMOL/L (ref 22–29)
EGFRCR SERPLBLD CKD-EPI 2021: >90 ML/MIN/1.73M2
ERYTHROCYTE [DISTWIDTH] IN BLOOD BY AUTOMATED COUNT: 14.1 % (ref 10–15)
GLUCOSE BLDC GLUCOMTR-MCNC: 123 MG/DL (ref 70–99)
GLUCOSE BLDC GLUCOMTR-MCNC: 169 MG/DL (ref 70–99)
GLUCOSE BLDC GLUCOMTR-MCNC: 169 MG/DL (ref 70–99)
GLUCOSE SERPL-MCNC: 141 MG/DL (ref 70–99)
HCO3 BLDV-SCNC: 32 MMOL/L (ref 21–28)
HCO3 BLDV-SCNC: 34 MMOL/L (ref 21–28)
HCT VFR BLD AUTO: 43.2 % (ref 35–47)
HGB BLD-MCNC: 13.5 G/DL (ref 11.7–15.7)
HOLD SPECIMEN: NORMAL
MAGNESIUM SERPL-MCNC: 2.3 MG/DL (ref 1.7–2.3)
MCH RBC QN AUTO: 31.5 PG (ref 26.5–33)
MCHC RBC AUTO-ENTMCNC: 31.3 G/DL (ref 31.5–36.5)
MCV RBC AUTO: 101 FL (ref 78–100)
NT-PROBNP SERPL-MCNC: 245 PG/ML (ref 0–900)
O2/TOTAL GAS SETTING VFR VENT: 3 %
O2/TOTAL GAS SETTING VFR VENT: 30 %
OXYHGB MFR BLDV: 61 % (ref 70–75)
OXYHGB MFR BLDV: 94 % (ref 70–75)
PCO2 BLDV: 55 MM HG (ref 40–50)
PCO2 BLDV: 62 MM HG (ref 40–50)
PH BLDV: 7.35 [PH] (ref 7.32–7.43)
PH BLDV: 7.37 [PH] (ref 7.32–7.43)
PLATELET # BLD AUTO: 190 10E3/UL (ref 150–450)
PO2 BLDV: 33 MM HG (ref 25–47)
PO2 BLDV: 77 MM HG (ref 25–47)
POTASSIUM SERPL-SCNC: 4.3 MMOL/L (ref 3.4–5.3)
RBC # BLD AUTO: 4.29 10E6/UL (ref 3.8–5.2)
SAO2 % BLDV: 62.1 % (ref 70–75)
SAO2 % BLDV: 95.8 % (ref 70–75)
SODIUM SERPL-SCNC: 140 MMOL/L (ref 135–145)
WBC # BLD AUTO: 6.6 10E3/UL (ref 4–11)

## 2024-01-26 PROCEDURE — 250N000013 HC RX MED GY IP 250 OP 250 PS 637: Performed by: INTERNAL MEDICINE

## 2024-01-26 PROCEDURE — 85027 COMPLETE CBC AUTOMATED: CPT | Performed by: HOSPITALIST

## 2024-01-26 PROCEDURE — 250N000009 HC RX 250: Performed by: INTERNAL MEDICINE

## 2024-01-26 PROCEDURE — 82805 BLOOD GASES W/O2 SATURATION: CPT | Performed by: HOSPITALIST

## 2024-01-26 PROCEDURE — 99223 1ST HOSP IP/OBS HIGH 75: CPT | Performed by: INTERNAL MEDICINE

## 2024-01-26 PROCEDURE — 71045 X-RAY EXAM CHEST 1 VIEW: CPT

## 2024-01-26 PROCEDURE — 94660 CPAP INITIATION&MGMT: CPT

## 2024-01-26 PROCEDURE — 200N000001 HC R&B ICU

## 2024-01-26 PROCEDURE — 94640 AIRWAY INHALATION TREATMENT: CPT

## 2024-01-26 PROCEDURE — 250N000009 HC RX 250: Performed by: HOSPITALIST

## 2024-01-26 PROCEDURE — 99232 SBSQ HOSP IP/OBS MODERATE 35: CPT | Performed by: HOSPITALIST

## 2024-01-26 PROCEDURE — 250N000013 HC RX MED GY IP 250 OP 250 PS 637: Performed by: HOSPITALIST

## 2024-01-26 PROCEDURE — 82805 BLOOD GASES W/O2 SATURATION: CPT | Performed by: INTERNAL MEDICINE

## 2024-01-26 PROCEDURE — 250N000011 HC RX IP 250 OP 636: Performed by: INTERNAL MEDICINE

## 2024-01-26 PROCEDURE — 36415 COLL VENOUS BLD VENIPUNCTURE: CPT | Performed by: HOSPITALIST

## 2024-01-26 PROCEDURE — 94640 AIRWAY INHALATION TREATMENT: CPT | Mod: 76

## 2024-01-26 PROCEDURE — 250N000011 HC RX IP 250 OP 636: Performed by: HOSPITALIST

## 2024-01-26 PROCEDURE — 999N000073 HC STATISTIC HELIOX THERAPY

## 2024-01-26 PROCEDURE — 999N000157 HC STATISTIC RCP TIME EA 10 MIN

## 2024-01-26 PROCEDURE — 83735 ASSAY OF MAGNESIUM: CPT | Performed by: HOSPITALIST

## 2024-01-26 PROCEDURE — 36415 COLL VENOUS BLD VENIPUNCTURE: CPT | Performed by: INTERNAL MEDICINE

## 2024-01-26 PROCEDURE — 80048 BASIC METABOLIC PNL TOTAL CA: CPT | Performed by: HOSPITALIST

## 2024-01-26 PROCEDURE — 258N000003 HC RX IP 258 OP 636: Performed by: INTERNAL MEDICINE

## 2024-01-26 PROCEDURE — 83880 ASSAY OF NATRIURETIC PEPTIDE: CPT | Performed by: INTERNAL MEDICINE

## 2024-01-26 RX ORDER — DEXTROSE MONOHYDRATE 25 G/50ML
25-50 INJECTION, SOLUTION INTRAVENOUS
Status: DISCONTINUED | OUTPATIENT
Start: 2024-01-26 | End: 2024-02-14 | Stop reason: HOSPADM

## 2024-01-26 RX ORDER — NICOTINE POLACRILEX 4 MG
15-30 LOZENGE BUCCAL
Status: DISCONTINUED | OUTPATIENT
Start: 2024-01-26 | End: 2024-02-14 | Stop reason: HOSPADM

## 2024-01-26 RX ORDER — IPRATROPIUM BROMIDE AND ALBUTEROL SULFATE 2.5; .5 MG/3ML; MG/3ML
3 SOLUTION RESPIRATORY (INHALATION)
Status: DISCONTINUED | OUTPATIENT
Start: 2024-01-26 | End: 2024-01-26

## 2024-01-26 RX ORDER — IPRATROPIUM BROMIDE AND ALBUTEROL SULFATE 2.5; .5 MG/3ML; MG/3ML
3 SOLUTION RESPIRATORY (INHALATION)
Status: DISCONTINUED | OUTPATIENT
Start: 2024-01-26 | End: 2024-01-27

## 2024-01-26 RX ORDER — MAGNESIUM SULFATE HEPTAHYDRATE 40 MG/ML
2 INJECTION, SOLUTION INTRAVENOUS ONCE
Status: COMPLETED | OUTPATIENT
Start: 2024-01-26 | End: 2024-01-26

## 2024-01-26 RX ADMIN — METHYLPREDNISOLONE SODIUM SUCCINATE 62.5 MG: 125 INJECTION, POWDER, FOR SOLUTION INTRAMUSCULAR; INTRAVENOUS at 00:16

## 2024-01-26 RX ADMIN — AMIODARONE HYDROCHLORIDE 200 MG: 200 TABLET ORAL at 09:23

## 2024-01-26 RX ADMIN — CLONAZEPAM 0.5 MG: 0.5 TABLET ORAL at 09:23

## 2024-01-26 RX ADMIN — METHYLPREDNISOLONE SODIUM SUCCINATE 62.5 MG: 125 INJECTION, POWDER, FOR SOLUTION INTRAMUSCULAR; INTRAVENOUS at 12:31

## 2024-01-26 RX ADMIN — IPRATROPIUM BROMIDE AND ALBUTEROL SULFATE 3 ML: .5; 3 SOLUTION RESPIRATORY (INHALATION) at 08:01

## 2024-01-26 RX ADMIN — IPRATROPIUM BROMIDE AND ALBUTEROL SULFATE 3 ML: .5; 3 SOLUTION RESPIRATORY (INHALATION) at 17:45

## 2024-01-26 RX ADMIN — AZITHROMYCIN MONOHYDRATE 250 MG: 500 INJECTION, POWDER, LYOPHILIZED, FOR SOLUTION INTRAVENOUS at 17:31

## 2024-01-26 RX ADMIN — RACEPINEPHRINE HYDROCHLORIDE 0.5 ML: 11.25 SOLUTION RESPIRATORY (INHALATION) at 18:22

## 2024-01-26 RX ADMIN — APIXABAN 5 MG: 5 TABLET, FILM COATED ORAL at 21:02

## 2024-01-26 RX ADMIN — FLUTICASONE FUROATE AND VILANTEROL TRIFENATATE 1 PUFF: 200; 25 POWDER RESPIRATORY (INHALATION) at 08:01

## 2024-01-26 RX ADMIN — GUAIFENESIN 600 MG: 600 TABLET, EXTENDED RELEASE ORAL at 09:23

## 2024-01-26 RX ADMIN — METHYLPREDNISOLONE SODIUM SUCCINATE 62.5 MG: 125 INJECTION, POWDER, FOR SOLUTION INTRAMUSCULAR; INTRAVENOUS at 23:48

## 2024-01-26 RX ADMIN — IPRATROPIUM BROMIDE AND ALBUTEROL SULFATE 3 ML: .5; 3 SOLUTION RESPIRATORY (INHALATION) at 05:21

## 2024-01-26 RX ADMIN — IPRATROPIUM BROMIDE AND ALBUTEROL SULFATE 3 ML: .5; 3 SOLUTION RESPIRATORY (INHALATION) at 11:07

## 2024-01-26 RX ADMIN — APIXABAN 5 MG: 5 TABLET, FILM COATED ORAL at 09:23

## 2024-01-26 RX ADMIN — ATORVASTATIN CALCIUM 20 MG: 20 TABLET, FILM COATED ORAL at 09:23

## 2024-01-26 RX ADMIN — PAROXETINE HYDROCHLORIDE 20 MG: 20 TABLET, FILM COATED ORAL at 09:23

## 2024-01-26 RX ADMIN — GUAIFENESIN 600 MG: 600 TABLET, EXTENDED RELEASE ORAL at 21:02

## 2024-01-26 RX ADMIN — IPRATROPIUM BROMIDE AND ALBUTEROL SULFATE 3 ML: .5; 3 SOLUTION RESPIRATORY (INHALATION) at 18:14

## 2024-01-26 RX ADMIN — MAGNESIUM SULFATE HEPTAHYDRATE 2 G: 2 INJECTION, SOLUTION INTRAVENOUS at 16:45

## 2024-01-26 RX ADMIN — IPRATROPIUM BROMIDE AND ALBUTEROL SULFATE 3 ML: .5; 3 SOLUTION RESPIRATORY (INHALATION) at 15:41

## 2024-01-26 RX ADMIN — ALBUTEROL SULFATE 2.5 MG: 2.5 SOLUTION RESPIRATORY (INHALATION) at 20:35

## 2024-01-26 RX ADMIN — IPRATROPIUM BROMIDE AND ALBUTEROL SULFATE 3 ML: .5; 3 SOLUTION RESPIRATORY (INHALATION) at 21:36

## 2024-01-26 RX ADMIN — ACETAMINOPHEN 500 MG: 500 TABLET, FILM COATED ORAL at 21:02

## 2024-01-26 RX ADMIN — LOSARTAN POTASSIUM 25 MG: 25 TABLET, FILM COATED ORAL at 09:23

## 2024-01-26 RX ADMIN — LEVOTHYROXINE SODIUM 112 MCG: 0.11 TABLET ORAL at 09:23

## 2024-01-26 RX ADMIN — ALBUTEROL SULFATE 2.5 MG: 2.5 SOLUTION RESPIRATORY (INHALATION) at 19:24

## 2024-01-26 ASSESSMENT — ACTIVITIES OF DAILY LIVING (ADL)
ADLS_ACUITY_SCORE: 28

## 2024-01-26 NOTE — CONSULTS
"PULMONARY CONSULT  Date of service: 2024    Patient: Lara Melendez      : 1965      MRN: [id number]    We were consulted by Dr. Ren for evaluation of \"COPD exacerbation. 3rd admission in 3 weeks. Difficulty getting her off bipap\"        Impressions/Recommendations:   58 year old female with PMHx most significant for COPD and emphysema.  Patient was admitted to the hospital for acute exacerbation of her COPD.  She has had significant bronchospasm and has required BiPAP continuously.    Acutely will need to control her bronchospasm.  I will increase her DuoNeb to every hours, I will add 1 dose of magnesium 2 g to be infused over 20 minutes, and I will also add a heliox treatment, might repeat the heliox again if she benefited from the first treatment.  I will also add azithromycin 250 mg once daily for 4 more days.  Continue the systemic steroids.  Continue the BiPAP until control her bronchospasm.  Her VBG while on the BiPAP showed pCO2 62, and pH 7.35.  Repeat VBG now.    As for her acute exacerbation this time, no obvious trigger for her COPD exacerbation at this time she had recurrent exacerbations with admissions to the hospital, no identifiable triggers in the past.  Overall her inhaled medications will need to be optimized on discharge, I would suggest adding LAMA to her regimen with Spiriva on discharge.  Consideration for azithromycin or Roflumilast also to be added down the road if she continues to have recurrent exacerbation despite adding the LAMA and being on triple inhaler therapy.  With her hypercapnia, a consideration also for nocturnal BiPAP as an outpatient, but I recommend to stabilize her first and repeat blood gas in 3 to 4 weeks after discharge and if she continues to have hypercapnia then would consider adding BiPAP nocturnally.  She would also benefit from referral to pulmonary rehab.      Severe acute exacerbation COPD  History of intubation in the past with trach    -Increase " her frequency of the nebulized DuoNeb to every hour while awake  -I will give her a dose of IV magnesium infusion 2 g over 20 minutes, her magnesium this morning was within normal limits  -I will give her 1 treatment of heliox, might repeat that later tonight if she benefited  from the first treatment but still having bronchospasm  -Continue systemic steroids per primary  -Add azithromycin  mg daily for 4 more days  -On discharge add Spiriva once daily and continue Breo once daily, she is on the high-dose ICS already.  -She needs to follow-up with pulmonary as an outpatient with PFT, if she continues to have recurrent exacerbations despite being on triple therapy then would recommend adding azithromycin 3 times a week  -Also she would need ABG after recovering and that she is stable in 3 to 4 weeks, and if she continues to have hypercapnia that she would benefit from nocturnal noninvasive mechanical ventilation  -Patient also has a history of intubation with tracheostomy in 2023 so tracheal stenosis is a possibility but her exam does not fit as she has no inspiratory stridor when I listen to her neck and only I hear expiratory wheezing, nevertheless I will give her a dose of racemic epi, continue the steroids and heliox.    Acute hypoxemic and hypercapnic respiratory failure    -Treat bronchospasm acute Chronic Obstructive Pulmonary Disease Exacerbation as above  -Continue the BiPAP until her bronchospasm improves  -Monitor VBG, if she does not improve with the BiPAP treatment she might need to be transferred to the ICU and get intubated.    Paroxysmal atrial fibrillation  Anxiety       I spent 80 minutes reviewing chart, reviewing test results, talking with and examining patient, formulating plan, and documentation on the day of the encounter.    Chart documentation was completed, in part, with Quietly voice-recognition software. Even though reviewed, some grammatical, spelling, and word errors may  remain.          Kimmy Valdez MD  Pulmonary & Critical Care           History of Present Illness:   Lara Melendez is a 58 year old female who presented to [facility] on 1/22/2024 with complaints of dyspnea and wheezing for 3 days.  On admission patient was diagnosed w/ COPD exacerbation.  Patient does have a history of severe COPD per record review from care everywhere.  [facility]  Unfortunately I do not have access to her PFT in the past but per chart review for the last pulmonary note her FEV1 was 1.57 L or 62% predicted, FVC 83% predicted, FEV1/FVC ratio is 60%, DLCO was 62% predicted in October 2023.  At home the patient has been managed with inhaled corticosteroid/LABA combination with Breo once daily.  She states that in the past she was on Trelegy which was not felt to be helpful by her.  She never combine both Breo and Spiriva together.  She also uses rescue inhaler with MITCHELL as needed.  Patient was admitted to the hospital and she has been treated with systemic steroids, initially with oral prednisone that she was transitioned to IV Solu-Medrol due to worsening after transient improvement.  She also received 3-day course of antibiotics with Levaquin 750 mg daily and 1 dose of azithromycin 500 mg.  The patient is struggling with her breathing, she is tripoding, but she is able to complete full sentences while on the BiPAP.  She states that she feels tight with her breathing.  She does not have significant cough or sputum production, but she feels her chest is very congested.  Patient has had multiple admissions in the last 3 weeks for the same problem of COPD exacerbation.                Review of Symptoms:   10-point ROS reviewed, & found negative w/ exceptions noted in the HPI.          Past Medical History:     Past Medical History:   Diagnosis Date    Anxiety     COPD (chronic obstructive pulmonary disease)     Diverticulitis of colon     Hypercholesterolemia      Hypertension     Hypothyroidism     Infection due to 2019 novel coronavirus 05/14/2022    Paroxysmal atrial fibrillation     Psoriasis     Pulmonary nodule - left upper lobe        Past Surgical History:   Procedure Laterality Date    CHOLECYSTECTOMY      COLOSTOMY N/A 7/12/2023    Procedure: OPENING DIVERTING COLOSTOMY;  Surgeon: Jaspal Rashid MD;  Location: [facility] OR    INCISION AND DRAINAGE MANDIBLE, COMBINED Left 01/10/2022    Procedure: INCISION AND DRAINAGE, MANDIBLE;  Surgeon: Jn Feliz DDS;  Location: [facility] OR    INCISION AND DRAINAGE MANDIBLE, COMBINED Left 02/04/2022    Procedure: INCISION AND DRAINAGE, MANDIBLE;  Surgeon: Taylor Ortez DDS;  Location: [facility] OR    TOOTH EXTRACTION      Vocal Cord surgery              Allergies:     Allergies   Allergen Reactions    Sulfa Antibiotics     Doxycycline Other (See Comments)     Develops chest tightness  Intolerance not allergy    Penicillins Rash     Generalized rash  Rash, Generalized               Outpatient Medications:     No current facility-administered medications on file prior to encounter.  albuterol (PROAIR HFA/PROVENTIL HFA/VENTOLIN HFA) 108 (90 Base) MCG/ACT inhaler, Inhale 2 puffs into the lungs every 4 hours as needed for shortness of breath / dyspnea or wheezing Inhale 1-2 Puffs every 4 hours as needed for Wheezing.  albuterol (PROVENTIL) (2.5 MG/3ML) 0.083% neb solution, Take 1 vial (2.5 mg) by nebulization every 4 hours as needed for shortness of breath or wheezing  amiodarone (PACERONE) 200 MG tablet, 1 tablet (200 mg) by Oral or Feeding Tube route daily (Patient taking differently: Take 200 mg by mouth daily)  apixaban ANTICOAGULANT (ELIQUIS) 5 MG tablet, Take 1 tablet (5 mg) by mouth 2 times daily  atorvastatin (LIPITOR) 20 MG tablet, Take 20 mg by mouth daily  clonazePAM (KLONOPIN) 0.5 MG tablet, Take 0.5 mg by mouth daily  fluticasone-vilanterol (BREO ELLIPTA) 200-25 MCG/ACT inhaler, Inhale 1 Dose into the lungs daily  ipratropium -  "albuterol 0.5 mg/2.5 mg/3 mL (DUONEB) 0.5-2.5 (3) MG/3ML neb solution, Take 1 vial (3 mLs) by nebulization every 6 hours as needed for shortness of breath / dyspnea or wheezing  levalbuterol (XOPENEX) 0.63 MG/3ML neb solution, Inhale 0.63 mg into the lungs every 4 hours as needed  levothyroxine (SYNTHROID/LEVOTHROID) 112 MCG tablet, Take 112 mcg by mouth daily  losartan (COZAAR) 25 MG tablet, Take 1 tablet by mouth daily  sodium chloride (NEBUSAL) 3 % neb solution, Inhale 4 mLs into the lungs 2 times daily  PARoxetine (PAXIL) 20 MG tablet, Take 20 mg by mouth daily              Family History:     Family History   Problem Relation Age of Onset    Deep Vein Thrombosis (DVT) Mother     Hypertension Mother                Social History:     Patient is a former smoker, she quit smoking in 2022.            Physical Exam:   BP (!) 173/88 (BP Location: Right arm)   Pulse 78   Temp 98.1  F (36.7  C) (Axillary)   Resp 18   Ht 1.6 m (5' 3\")   Wt 67 kg (147 lb 9.6 oz)   SpO2 98%   BMI 26.15 kg/m      General: Short of breath  HEENT: BiPAP mask was noted  CV: no m/r/g  Lungs: Diffuse bilateral inspiratory and expiratory wheezing with diminished air entry bilaterally.  Abd: Soft, NT, ND  Ext: No LE edema  Skin: No rashes, cyanosis, or jaundice  Neuro: AAOx3, no focal deficits          Data:   Labs (all laboratory studies reviewed by me):   CMP:   Recent Labs   Lab 01/26/24  1049 01/26/24  0746 01/25/24  0750 01/24/24  0746 01/22/24  1527     --  138 138 138   POTASSIUM 4.3  --  3.8 4.4 4.3   CHLORIDE 101  --  101 100 100   CO2 29  --  31* 28 25   ANIONGAP 10  --  6* 10 13   *  --  94 116* 126*   BUN 19.2  --  17.5 16.5 8.2   CR 0.75  --  0.98* 0.91 0.93   GFRESTIMATED >90  --  66 72 70   EDIN 9.2  --  8.7* 9.2 9.3   MAG  --  2.3 2.1 2.2 2.2   PROTTOTAL  --   --   --   --  7.2   ALBUMIN  --   --   --   --  4.4   BILITOTAL  --   --   --   --  0.3   ALKPHOS  --   --   --   --  80   AST  --   --   --   --  27 " "  ALT  --   --   --   --  40     CBC:   Recent Labs   Lab 01/26/24  1049 01/24/24  0746 01/22/24  1527   WBC 6.6 11.5* 10.3   RBC 4.29 4.15 4.56   HGB 13.5 13.7 15.0   HCT 43.2 41.0 44.6   * 99 98   MCH 31.5 33.0 32.9   MCHC 31.3* 33.4 33.6   RDW 14.1 14.4 14.3    204 201       INR: No lab results found in last 7 days.    Glucose:   Recent Labs   Lab 01/26/24  1049 01/25/24  0750 01/24/24  0746 01/22/24  1527   * 94 116* 126*       Blood Gas:   Recent Labs   Lab 01/26/24  1049 01/25/24  0749 01/22/24  1533   PHV 7.35 7.40 7.42   PCO2V 62* 56* 42   PO2V 33 48* 103*   HCO3V 34* 34* 27   SUSANNAH 6.5* 7.4*  --    O2PER 3 2  --        Culture Data No results for input(s): \"CULT\" in the last 168 hours.    Virology Data:   Lab Results   Component Value Date    FLUAH1 Not Detected 01/09/2024    FLUAH3 Not Detected 01/09/2024    WP0672 Not Detected 01/09/2024    IFLUB Not Detected 01/09/2024    RSVA Not Detected 01/09/2024    RSVB Not Detected 01/09/2024    PIV1 Not Detected 01/09/2024    PIV2 Not Detected 01/09/2024    PIV3 Not Detected 01/09/2024    HMPV Not Detected 01/09/2024       Historical CMV results (last 3 of prior testing):  Lab Results   Component Value Date    CMVQNT Not Detected 07/07/2023     No results found for: \"CMVLOG\"    Urine Studies    Recent Labs   Lab Test 06/28/23  1001   URINEPH 5.5   NITRITE Negative   LEUKEST Large*   WBCU >182*       Most Recent Breeze Pulmonary Function Testing (FVC/FEV1 only)  From care everywhere \"PFTs from 10/17/2023 were reviewed:  FVC 2.61 L, or 83 % predicted.  FEV1 1.57 L, or 62 % predicted.  FEV1/FVC: 60 %  Diffusing Capacity 13.66, or 62 % predicted\"      Imaging (all imaging studies reviewed by me):  Chest x-ray on 1/26/2024: No focal infiltrate, flattening of the diaphragm bilaterally indicating hyperinflation.  Interstitial markings bilaterally.      Procedures:   None        "

## 2024-01-26 NOTE — PROGRESS NOTES
Cross cover:    Patient transferring to ICU for initiation of Heliox as per Pulmonary Medicine.  Paged w/ request to place ICU orders.    Aftab Malave MD

## 2024-01-26 NOTE — PROGRESS NOTES
[facility]    Hospitalist Progress Note      Assessment & Plan   Lara Melendez is a 58 year old female with a past medical history of COPD, atrial fibrillation, chronic anticoagulation with Eliquis, hypothyroidism, hypertension and anxiety with multiple recent admissions for COPD exacerbation who presents with shortness of breath, cough and wheezing.     This is now patient's third admission in about 3-1/2 weeks or so.  She was recently discharged on 1/10/2024 for acute hypoxemic respiratory failure secondary to COPD exacerbation requiring BiPAP therapy rescue.  She was treated with steroids, bronchodilator therapy along with Zithromax.  It was discussed with the patient that secondhand smoke exposure at home may be contributing to her recurrent symptoms.  She was discharged on a prednisone taper.     # Acute hypoxemic respiratory failure secondary to COPD exacerbation: Patient had finished her taper a few days before presentation.  She describes shortness of breath, cough along with wheezing.  She notes that she is trying to come to the hospital earlier when she has the symptoms to prevent intubation as she has had prolonged intubation in the past requiring trach in the summer 2023.  In the ER, patient afebrile and hemodynamically stable though with significant work of breathing with diffuse wheezing.  She was placed on BiPAP therapy.  Chest x-ray without significant infiltrate.  CBC and BMP unremarkable.  Lactic acid within normal limits VBG 7.42/42.  -Patient showed symptomatic improvement on 1/24 but the following day., felt quite short of breath getting to the bathroom.  Wheezy on exam.  VBG checked and stable.  I suspect she needs a bit longer on IV steroids.  Restarted IV methylpred  -On 1/26, patient is still quite tight on exam.  She has been requiring bipap more frequently for respiratory support for WOB at minimal FiO2.  Added guaifenesin to help thin secretions. Aerobika therapy.  "Hypertonic nebs added as well to see if this can help.    -Continue IV steroids, schedule duonebs, albuterol prn.   -Given lack of significant improvement with recurrent admission, will have pulmonary see patient on 1/26.  I do think that underlying anxiety and COPD are feeding off each other quite a bit.   -Repeat VBG and CXR on 1/26.   -Stopped levaquin with negative procal and low suspicion for bacterial infx component.   -Patient requests reinitiation of her home Breo which has been ordered.    Addendum: Pulmonary consultation reviewed. Patient transferred to ICU for heliox treatment along with more frequent nebs.  I did touch base with tele-intensivist to make him aware of her transfer to ICU.      #Paroxysmal afib: Continue home DOAC, amiodarone.   #Hypothyroidism: Continue Synthroid.  #Anxiety: Resume home meds  #Status post diverting colostomy: This was done on 7/12/2023 due to large bowel obstruction.  Patient has colostomy and has good output.  WOC consulted     DVT PPX: DOAC  CODE STATUS: FULL  Dispo: Suspect a few more days. Pulmonary consultation given recurrent admissions and lack of clinical improvement.     Tommie Ren MD    Interval History   Bipap overnight and this AM. Feels more \"tight.\" She attributes this to IV steroids. Denies CP. No n/v. Feels like she is not bringing anything up.      -Data reviewed today: I reviewed all new labs and imaging results over the last 24 hours. I personally reviewed     Physical Exam   Temp: 98.1  F (36.7  C) Temp src: Axillary BP: (!) 173/88 Pulse: 71   Resp: 20 SpO2: 99 % O2 Device: BiPAP/CPAP Oxygen Delivery: 2 LPM  Vitals:    01/22/24 1833   Weight: 67 kg (147 lb 9.6 oz)     Vital Signs with Ranges  Temp:  [97  F (36.1  C)-98.1  F (36.7  C)] 98.1  F (36.7  C)  Pulse:  [71-86] 71  Resp:  [18-24] 20  BP: (154-173)/(82-90) 173/88  FiO2 (%):  [30 %] 30 %  SpO2:  [94 %-99 %] 99 %  I/O last 3 completed shifts:  In: 480 [P.O.:480]  Out: 725 [Urine:600; " Stool:125]    Constitutional: Bipap in place but conversational. Anxious.   HEENT: Normocephalic. MMM, No elevation of JVD noted.   Respiratory: Bipap in place. Diminished bilaterally with bilateral expiratory wheeze with rpolonged expiratory phase.   Cardiovascular: Regular, no murmur  GI: BS+, NT, ND  Skin/Integumen: WWP, no rash. No edema  Neuro: CNII-XII intact. Moves all extremities. No tremor. A&Ox3.    Medications    - MEDICATION INSTRUCTIONS -      - MEDICATION INSTRUCTIONS -      - MEDICATION INSTRUCTIONS -        amiodarone  200 mg Oral Daily    apixaban ANTICOAGULANT  5 mg Oral BID    atorvastatin  20 mg Oral Daily    clonazePAM  0.5 mg Oral Daily    fluticasone-vilanterol  1 puff Inhalation Daily    guaiFENesin  600 mg Oral BID    ipratropium - albuterol 0.5 mg/2.5 mg/3 mL  3 mL Nebulization 4x daily    levothyroxine  112 mcg Oral Daily    losartan  25 mg Oral Daily    methylPREDNISolone  62.5 mg Intravenous Q12H    PARoxetine  20 mg Oral Daily    sodium chloride (PF)  3 mL Intracatheter Q8H       Data   Recent Labs   Lab 01/25/24  0750 01/24/24  0746 01/22/24  1527   WBC  --  11.5* 10.3   HGB  --  13.7 15.0   MCV  --  99 98   PLT  --  204 201    138 138   POTASSIUM 3.8 4.4 4.3   CHLORIDE 101 100 100   CO2 31* 28 25   BUN 17.5 16.5 8.2   CR 0.98* 0.91 0.93   ANIONGAP 6* 10 13   EDIN 8.7* 9.2 9.3   GLC 94 116* 126*   ALBUMIN  --   --  4.4   PROTTOTAL  --   --  7.2   BILITOTAL  --   --  0.3   ALKPHOS  --   --  80   ALT  --   --  40   AST  --   --  27       No results found for this or any previous visit (from the past 24 hour(s)).

## 2024-01-26 NOTE — PROGRESS NOTES
Pt is alert and oriented, was in bed entire shift except to the bathroom. Colostomy is intact. Labored breathing with activities noted, On 2 liters oxygen. Sodium chloride nebulizer added to her orders. Has a good appetite and drinking adequate amount of fluids. Requesting to use BIPAP  intermittently. .

## 2024-01-26 NOTE — PLAN OF CARE
Goal Outcome Evaluation:      Plan of Care Reviewed With: patient    Overall Patient Progress: improving    Pt A&O, BP elevated, on 2L NC when not on Bipap but patient was on Bipap most of the night, Tele SR, getting solumedrol and nebs. LS wheezes. Has a colostomy that pt self manages. WOC consulted.  Will most likely be here a couple more days.

## 2024-01-26 NOTE — PROGRESS NOTES
I discussed with nursing staff and RT. Not able to do Heliox treatment on the floor and it can be done only in the ICU, also with the need for more frequent Neb and the possibility of continuous Neb, she is better served in the ICU, she will need to be transferred to the ICU, continue the BIPAP and monitor blood gas, if worsening respiratory acidosis, the patient will need to be intubated.     Kimmy Valdez MD  Pulmonary and Critic al Care

## 2024-01-27 LAB
BASE EXCESS BLDV CALC-SCNC: 6.1 MMOL/L (ref -3–3)
GLUCOSE BLDC GLUCOMTR-MCNC: 131 MG/DL (ref 70–99)
GLUCOSE BLDC GLUCOMTR-MCNC: 139 MG/DL (ref 70–99)
GLUCOSE BLDC GLUCOMTR-MCNC: 140 MG/DL (ref 70–99)
GLUCOSE BLDC GLUCOMTR-MCNC: 140 MG/DL (ref 70–99)
GLUCOSE BLDC GLUCOMTR-MCNC: 145 MG/DL (ref 70–99)
HCO3 BLDV-SCNC: 33 MMOL/L (ref 21–28)
HOLD SPECIMEN: NORMAL
MAGNESIUM SERPL-MCNC: 2.5 MG/DL (ref 1.7–2.3)
O2/TOTAL GAS SETTING VFR VENT: 30 %
OXYHGB MFR BLDV: 95 % (ref 70–75)
PCO2 BLDV: 53 MM HG (ref 40–50)
PH BLDV: 7.4 [PH] (ref 7.32–7.43)
PO2 BLDV: 77 MM HG (ref 25–47)
SAO2 % BLDV: 96 % (ref 70–75)

## 2024-01-27 PROCEDURE — 250N000011 HC RX IP 250 OP 636: Performed by: INTERNAL MEDICINE

## 2024-01-27 PROCEDURE — 250N000012 HC RX MED GY IP 250 OP 636 PS 637: Performed by: HOSPITALIST

## 2024-01-27 PROCEDURE — 258N000003 HC RX IP 258 OP 636: Performed by: INTERNAL MEDICINE

## 2024-01-27 PROCEDURE — 94660 CPAP INITIATION&MGMT: CPT

## 2024-01-27 PROCEDURE — 82805 BLOOD GASES W/O2 SATURATION: CPT | Performed by: EMERGENCY MEDICINE

## 2024-01-27 PROCEDURE — 99291 CRITICAL CARE FIRST HOUR: CPT | Performed by: INTERNAL MEDICINE

## 2024-01-27 PROCEDURE — 250N000009 HC RX 250: Performed by: INTERNAL MEDICINE

## 2024-01-27 PROCEDURE — 83735 ASSAY OF MAGNESIUM: CPT | Performed by: HOSPITALIST

## 2024-01-27 PROCEDURE — 36415 COLL VENOUS BLD VENIPUNCTURE: CPT | Performed by: HOSPITALIST

## 2024-01-27 PROCEDURE — 250N000013 HC RX MED GY IP 250 OP 250 PS 637: Performed by: HOSPITALIST

## 2024-01-27 PROCEDURE — 200N000001 HC R&B ICU

## 2024-01-27 PROCEDURE — 250N000011 HC RX IP 250 OP 636: Performed by: HOSPITALIST

## 2024-01-27 PROCEDURE — 999N000157 HC STATISTIC RCP TIME EA 10 MIN

## 2024-01-27 PROCEDURE — 250N000009 HC RX 250: Performed by: HOSPITALIST

## 2024-01-27 PROCEDURE — C9113 INJ PANTOPRAZOLE SODIUM, VIA: HCPCS | Performed by: HOSPITALIST

## 2024-01-27 PROCEDURE — 999N000073 HC STATISTIC HELIOX THERAPY

## 2024-01-27 PROCEDURE — 94640 AIRWAY INHALATION TREATMENT: CPT | Mod: 76

## 2024-01-27 PROCEDURE — 99233 SBSQ HOSP IP/OBS HIGH 50: CPT | Performed by: HOSPITALIST

## 2024-01-27 PROCEDURE — 94640 AIRWAY INHALATION TREATMENT: CPT

## 2024-01-27 PROCEDURE — 93010 ELECTROCARDIOGRAM REPORT: CPT | Performed by: INTERNAL MEDICINE

## 2024-01-27 PROCEDURE — 250N000013 HC RX MED GY IP 250 OP 250 PS 637: Performed by: INTERNAL MEDICINE

## 2024-01-27 PROCEDURE — 999N000215 HC STATISTIC HFNC ADULT NON-CPAP

## 2024-01-27 RX ORDER — IPRATROPIUM BROMIDE AND ALBUTEROL SULFATE 2.5; .5 MG/3ML; MG/3ML
3 SOLUTION RESPIRATORY (INHALATION)
Status: DISCONTINUED | OUTPATIENT
Start: 2024-01-27 | End: 2024-01-27

## 2024-01-27 RX ORDER — DILTIAZEM HYDROCHLORIDE 5 MG/ML
10 INJECTION INTRAVENOUS ONCE
Status: COMPLETED | OUTPATIENT
Start: 2024-01-27 | End: 2024-01-27

## 2024-01-27 RX ORDER — IPRATROPIUM BROMIDE AND ALBUTEROL SULFATE 2.5; .5 MG/3ML; MG/3ML
3 SOLUTION RESPIRATORY (INHALATION)
Status: DISCONTINUED | OUTPATIENT
Start: 2024-01-27 | End: 2024-01-28

## 2024-01-27 RX ORDER — LEVALBUTEROL INHALATION SOLUTION 1.25 MG/3ML
1.25 SOLUTION RESPIRATORY (INHALATION)
Status: DISCONTINUED | OUTPATIENT
Start: 2024-01-27 | End: 2024-01-27

## 2024-01-27 RX ORDER — HYDRALAZINE HYDROCHLORIDE 20 MG/ML
10 INJECTION INTRAMUSCULAR; INTRAVENOUS EVERY 4 HOURS PRN
Status: DISCONTINUED | OUTPATIENT
Start: 2024-01-27 | End: 2024-02-14 | Stop reason: HOSPADM

## 2024-01-27 RX ORDER — DILTIAZEM HCL/D5W 125 MG/125
5-15 PLASTIC BAG, INJECTION (ML) INTRAVENOUS CONTINUOUS
Status: DISCONTINUED | OUTPATIENT
Start: 2024-01-27 | End: 2024-01-28

## 2024-01-27 RX ORDER — HYDROXYZINE HYDROCHLORIDE 10 MG/1
10 TABLET, FILM COATED ORAL 3 TIMES DAILY PRN
Status: DISCONTINUED | OUTPATIENT
Start: 2024-01-27 | End: 2024-01-28

## 2024-01-27 RX ORDER — LORAZEPAM 2 MG/ML
.5-1 INJECTION INTRAMUSCULAR EVERY 4 HOURS PRN
Status: DISCONTINUED | OUTPATIENT
Start: 2024-01-27 | End: 2024-02-14 | Stop reason: HOSPADM

## 2024-01-27 RX ORDER — DILTIAZEM HYDROCHLORIDE 30 MG/1
30 TABLET, FILM COATED ORAL EVERY 6 HOURS SCHEDULED
Status: DISCONTINUED | OUTPATIENT
Start: 2024-01-27 | End: 2024-01-27

## 2024-01-27 RX ORDER — LORAZEPAM 2 MG/ML
0.5 INJECTION INTRAMUSCULAR ONCE
Status: COMPLETED | OUTPATIENT
Start: 2024-01-27 | End: 2024-01-27

## 2024-01-27 RX ADMIN — IPRATROPIUM BROMIDE AND ALBUTEROL SULFATE 3 ML: .5; 3 SOLUTION RESPIRATORY (INHALATION) at 11:32

## 2024-01-27 RX ADMIN — CLONAZEPAM 0.5 MG: 0.5 TABLET ORAL at 06:27

## 2024-01-27 RX ADMIN — LORAZEPAM 1 MG: 2 INJECTION INTRAMUSCULAR; INTRAVENOUS at 20:05

## 2024-01-27 RX ADMIN — PREDNISONE 60 MG: 10 TABLET ORAL at 12:44

## 2024-01-27 RX ADMIN — ALBUTEROL SULFATE 2.5 MG: 2.5 SOLUTION RESPIRATORY (INHALATION) at 00:35

## 2024-01-27 RX ADMIN — FLUTICASONE FUROATE AND VILANTEROL TRIFENATATE 1 PUFF: 200; 25 POWDER RESPIRATORY (INHALATION) at 07:50

## 2024-01-27 RX ADMIN — LOSARTAN POTASSIUM 25 MG: 25 TABLET, FILM COATED ORAL at 08:58

## 2024-01-27 RX ADMIN — LORAZEPAM 1 MG: 2 INJECTION INTRAMUSCULAR; INTRAVENOUS at 16:22

## 2024-01-27 RX ADMIN — DILTIAZEM HYDROCHLORIDE 30 MG: 30 TABLET, FILM COATED ORAL at 11:46

## 2024-01-27 RX ADMIN — APIXABAN 5 MG: 5 TABLET, FILM COATED ORAL at 20:05

## 2024-01-27 RX ADMIN — LEVOTHYROXINE SODIUM 112 MCG: 0.11 TABLET ORAL at 08:58

## 2024-01-27 RX ADMIN — APIXABAN 5 MG: 5 TABLET, FILM COATED ORAL at 08:58

## 2024-01-27 RX ADMIN — Medication 15 MG/HR: at 22:41

## 2024-01-27 RX ADMIN — GUAIFENESIN 600 MG: 600 TABLET, EXTENDED RELEASE ORAL at 20:05

## 2024-01-27 RX ADMIN — PAROXETINE HYDROCHLORIDE 20 MG: 20 TABLET, FILM COATED ORAL at 08:58

## 2024-01-27 RX ADMIN — ATORVASTATIN CALCIUM 20 MG: 20 TABLET, FILM COATED ORAL at 08:58

## 2024-01-27 RX ADMIN — GUAIFENESIN 600 MG: 600 TABLET, EXTENDED RELEASE ORAL at 08:58

## 2024-01-27 RX ADMIN — LORAZEPAM 0.5 MG: 2 INJECTION INTRAMUSCULAR; INTRAVENOUS at 12:07

## 2024-01-27 RX ADMIN — ALBUTEROL SULFATE 2.5 MG: 2.5 SOLUTION RESPIRATORY (INHALATION) at 20:38

## 2024-01-27 RX ADMIN — Medication 5 MG/HR: at 14:57

## 2024-01-27 RX ADMIN — IPRATROPIUM BROMIDE AND ALBUTEROL SULFATE 3 ML: .5; 3 SOLUTION RESPIRATORY (INHALATION) at 07:49

## 2024-01-27 RX ADMIN — LEVALBUTEROL HYDROCHLORIDE 1.25 MG: 1.25 SOLUTION RESPIRATORY (INHALATION) at 13:46

## 2024-01-27 RX ADMIN — PANTOPRAZOLE SODIUM 40 MG: 40 INJECTION, POWDER, FOR SOLUTION INTRAVENOUS at 12:09

## 2024-01-27 RX ADMIN — DILTIAZEM HYDROCHLORIDE 10 MG: 5 INJECTION INTRAVENOUS at 14:28

## 2024-01-27 RX ADMIN — IPRATROPIUM BROMIDE AND ALBUTEROL SULFATE 3 ML: .5; 3 SOLUTION RESPIRATORY (INHALATION) at 04:16

## 2024-01-27 RX ADMIN — AMIODARONE HYDROCHLORIDE 200 MG: 200 TABLET ORAL at 08:58

## 2024-01-27 RX ADMIN — AZITHROMYCIN MONOHYDRATE 250 MG: 500 INJECTION, POWDER, LYOPHILIZED, FOR SOLUTION INTRAVENOUS at 16:30

## 2024-01-27 ASSESSMENT — ACTIVITIES OF DAILY LIVING (ADL)
ADLS_ACUITY_SCORE: 28
ADLS_ACUITY_SCORE: 28
ADLS_ACUITY_SCORE: 29
ADLS_ACUITY_SCORE: 29
ADLS_ACUITY_SCORE: 28
ADLS_ACUITY_SCORE: 29
ADLS_ACUITY_SCORE: 28
ADLS_ACUITY_SCORE: 29
ADLS_ACUITY_SCORE: 28
ADLS_ACUITY_SCORE: 29

## 2024-01-27 NOTE — PROGRESS NOTES
[facility]    Hospitalist Progress Note             Date of Admission:  1/22/2024                   Day of hospitalization: 5    Assessment and Plan:   Lara Melendez is a 58 year old female with a past medical history of COPD, atrial fibrillation, chronic anticoagulation with Eliquis, hypothyroidism, hypertension and anxiety with multiple recent admissions for COPD exacerbation who presents with shortness of breath, cough and wheezing.     This is now patient's third admission in about 3-1/2 weeks or so.  She was recently discharged on 1/10/2024 for acute hypoxemic respiratory failure secondary to COPD exacerbation requiring BiPAP therapy rescue.  She was treated with steroids, bronchodilator therapy along with Zithromax.  It was discussed with the patient that secondhand smoke exposure at home may be contributing to her recurrent symptoms.  She was discharged on a prednisone taper.     # Acute hypoxemic respiratory failure secondary to COPD exacerbation: Patient had finished her taper a few days before presentation.  She describes shortness of breath, cough along with wheezing.  She notes that she is trying to come to the hospital earlier when she has the symptoms to prevent intubation as she has had prolonged intubation in the past requiring trach in the summer 2023.  In the ER, patient afebrile and hemodynamically stable though with significant work of breathing with diffuse wheezing.  She was placed on BiPAP therapy.  Chest x-ray without significant infiltrate.  CBC and BMP unremarkable.  Lactic acid within normal limits VBG 7.42/42.  -Patient showed symptomatic improvement on 1/24 but the following day., felt quite short of breath getting to the bathroom.  Wheezy on exam.  VBG checked and stable.   -Initially treated with IV solumedrol, now patient refusing IV, switched to oral prednisone 60 mg  -On 1/26, patient is still quite tight on exam.  She has been requiring bipap more  "frequently for respiratory support for WOB at minimal FiO2.  Added guaifenesin to help thin secretions.    -seen by pulmonology started patient on Heliox without significant improvement, currently in the ICU for this  -Continue steroids,schedule duo nebs. Difficulty with nebs due to tachycardia, did not tolerate xopenex, switched back to albuterol and ipratropium every 4 hours  -Repeat VBG and CXR on 1/26.   -Stopped levaquin with negative procal and low suspicion for bacterial infx component.   -Patient requests reinitiation of her home Breo which has been ordered.    #Paroxysmal afib: Continue home DOAC, amiodarone.   - appears to be in RVR  - will treat with IV diltiazem and drip, will get cardiology help  - EF of 70% hyperdynamic in echocardiogram 2023    #Hypothyroidism: Continue Synthroid.  #Anxiety: Resume home meds, ativan ordered PRN for anxiety  #Status post diverting colostomy: This was done on 7/12/2023 due to large bowel obstruction.  Patient has colostomy and has good output.  WOC consulted     DVT PPX: DOAC  CODE STATUS: FULL  Dispo: Suspect a few more days. Pulmonary consultation given recurrent admissions and lack of clinical improvement.                     Tali Haskins MD  Text Page (7am - 6pm, M-F)               Subjective   Chief Complaint:  SOB  Subjective: still feels quite sob, no significant change with helium. Appears to feel better after IV ativan. Refused IV solumedrol, and initially refusing neb treatment. Discussed treatment of COPD, changed nebs around and switched to oral prednisone        Objective   BP (!) 132/103   Pulse 120   Temp 97.3  F (36.3  C) (Axillary)   Resp 28   Ht 1.6 m (5' 3\")   Wt 67 kg (147 lb 11.3 oz)   SpO2 98%   BMI 26.17 kg/m       Physical Exam  General: Pt in NAD, normal appearance  HEENT: OP clear MMM, no JVD  Lungs: expiratory wheezing, normal breathing  without accessory muscle usage, no wheezing, rhonchi or crackles  Cardiac: +S1, S2, RRR, no MRG, no " "edema  Abdominal: normal bowel sounds, NT/ND  Skin: warm, dry, normal turgor, no rash  Psyche: A& O x3, appropriate affect             Intake/Output Summary (Last 24 hours) at 1/27/2024 1313  Last data filed at 1/27/2024 1115  Gross per 24 hour   Intake 240 ml   Output 600 ml   Net -360 ml           Labs and Imaging Results:      Recent Labs   Lab 01/26/24  1049 01/24/24  0746   WBC 6.6 11.5*   HGB 13.5 13.7    204        Recent Labs   Lab 01/26/24  1049 01/25/24  0750    138   CO2 29 31*   BUN 19.2 17.5      No results for input(s): \"INR\", \"PTT\" in the last 168 hours.   No results for input(s): \"CKMB\" in the last 168 hours.    Invalid input(s): \"TROPONINT\"     Recent Labs   Lab 01/22/24  1527   ALBUMIN 4.4   AST 27   ALT 40   ALKPHOS 80        Micro:     Radio:  XR Chest Port 1 View   Final Result   IMPRESSION: No focal acute airspace disease. Stable bilateral vascular   and interstitial prominence. Normal cardiac silhouette. Stable   ill-defined calcifications consistent with a granulomatous process at   the left apex.       MEHUL HOWE MD            SYSTEM ID:  [id number]      XR Chest Port 1 View   Final Result   IMPRESSION: No acute disease.       GHAZALA ORELLANA MD            SYSTEM ID:  [id number]              Medications:      Scheduled Meds:     amiodarone  200 mg Oral Daily    apixaban ANTICOAGULANT  5 mg Oral BID    atorvastatin  20 mg Oral Daily    azithromycin  250 mg Intravenous Q24H    clonazePAM  0.5 mg Oral Daily    diltiazem  30 mg Oral Q6H ELZBIETA    fluticasone-vilanterol  1 puff Inhalation Daily    guaiFENesin  600 mg Oral BID    levalbuterol  1.25 mg Nebulization Q2H    levothyroxine  112 mcg Oral Daily    losartan  25 mg Oral Daily    pantoprazole  40 mg Intravenous Daily with breakfast    PARoxetine  20 mg Oral Daily    predniSONE  60 mg Oral Daily    sodium chloride (PF)  3 mL Intracatheter Q8H     Continuous Infusions:     - MEDICATION INSTRUCTIONS -      - MEDICATION INSTRUCTIONS -   "    - MEDICATION INSTRUCTIONS -       PRN Meds:  acetaminophen, albuterol, albuterol, calcium carbonate, artificial tears, glucose **OR** dextrose **OR** glucagon, hydrALAZINE, hydrOXYzine HCl, ipratropium - albuterol 0.5 mg/2.5 mg/3 mL, lidocaine 4%, lidocaine (buffered or not buffered), nitroGLYcerin, - MEDICATION INSTRUCTIONS -, ondansetron **OR** ondansetron, - MEDICATION INSTRUCTIONS -, - MEDICATION INSTRUCTIONS -, prochlorperazine **OR** prochlorperazine **OR** prochlorperazine, senna-docusate **OR** senna-docusate, sodium chloride, sodium chloride, sodium chloride (PF)

## 2024-01-27 NOTE — CONSULTS
[facility]    Cardiology Consultation     Date of Admission:  1/22/2024    Assessment & Plan   Lara Melendez is a 58 year old female who was admitted on 1/22/2024.    Acute respiratory failure due to hypoxia secondary to COPD exacerbation  Atrial fibrillation with rapid ventricular rate  Uncontrolled hypertension, in part related to the respiratory distress    Discussion  At this time, patient appears to be in acute respiratory distress on nasal mask related to COPD exacerbation.  Unfortunately, she is not keen on taking IV steroids and therefore is on oral steroids.  She is also averse to using nebulizations due to side effects.  She may need to be transition to the BiPAP.  I have asked nursing team to talk to the hospitalist.    With respect to atrial fibrillation, it is persistent now with rapid ventricular rate.  She is taking amiodarone at home and apixaban.  She got an oral dose of Cardizem this morning and then a 10 mg IV bolus.  I agree with starting IV Cardizem drip.  I suspect the atrial fibrillation rate 1 improved to the respiratory status improves.    In addition, I wonder if the respiratory status is worsened by amiodarone.  I would like pulmonary to comment on the safety of continuation of amiodarone.    For now, lets try rate control with IV Cardizem and focus on improving her respiratory status which will in turn improve her heart rate control.    At today's visit, I reviewed the admission EKGs, the prior echo report from March 2023, prior cardiology notes, admission H&P and pulmonary notes.  Admission chest x-ray, labs including venous blood gas, CBC and BMP was reviewed.    Consider echocardiogram once heart rate is better controlled.  Check TSH    Critical care time of 40 minutes spent in evaluation management of this patient with acute respiratory failure due to COPD exacerbation, atrial fibrillation with rapid ventricular rate and uncontrolled hypertension along with  chart review, review of labs, discussion of care with the intensive care nurse.       Doyle Polanco MD, MD    Primary Care Physician   Sudhir Carroll    Reason for Consult   Reason for consult: I was asked by hospital to evaluate this patient for atrial fibrillation with rapid ventricular rate    History of Present Illness   Lara Melendez is a 58 year old female with PMH significant for COPD, atrial fibrillation on chronic anticoagulation, hypothyroidism, hypertension, anxiety, colostomy status who presents from home with worsening shortness of breath, and wheezing over the last 3 days.  She was admitted on the 22nd of this month.  Since admission, she has been on IV Solu-Medrol and now switched to prednisone as she does not want to take anymore IV steroids.  In addition, she has not been keen on taking nebulizers.    On admission, she had hypoxia as noted by oxygen saturation of 89% that improved with nasal cannula.  She was transferred to the ICU yesterday for treatment of heliox which can be only given in the ICU.  This was recommended by pulmonologist.    Earlier this morning, her shortness of breath has worsened.  She is on oxy mask and is tachypneic and in respiratory distress.  Her respiratory rate is greater than 35/min.  With this, her blood pressures have been elevated as well as heart rates with continuing atrial fibrillation with rapid ventricular rate.  We were consulted because of rapid ventricular rate of the atrial fibrillation.    Prior to this, she has had cardioversions last year for atrial fibrillation.  She was put on amiodarone which she is still taking at 1 mg/day.  On admission she was actually in sinus rhythm.  She is on apixaban for stroke prophylaxis.      Past Medical History   Past Medical History:   Diagnosis Date    Anxiety     COPD (chronic obstructive pulmonary disease)     Diverticulitis of colon     Hypercholesterolemia     Hypertension     Hypothyroidism     Infection due  to 2019 novel coronavirus 2022    Paroxysmal atrial fibrillation     Psoriasis     Pulmonary nodule - left upper lobe          Past Surgical History   Past Surgical History:   Procedure Laterality Date    CHOLECYSTECTOMY      COLOSTOMY N/A 2023    Procedure: OPENING DIVERTING COLOSTOMY;  Surgeon: Jaspal Rashid MD;  Location: [facility] OR    INCISION AND DRAINAGE MANDIBLE, COMBINED Left 01/10/2022    Procedure: INCISION AND DRAINAGE, MANDIBLE;  Surgeon: Jn Feliz DDS;  Location: [facility] OR    INCISION AND DRAINAGE MANDIBLE, COMBINED Left 2022    Procedure: INCISION AND DRAINAGE, MANDIBLE;  Surgeon: Taylor Ortez DDS;  Location: [facility] OR    TOOTH EXTRACTION      Vocal Cord surgery           Prior to Admission Medications   Prior to Admission Medications   Prescriptions Last Dose Informant Patient Reported? Taking?   PARoxetine (PAXIL) 20 MG tablet   Yes No   Sig: Take 20 mg by mouth daily   albuterol (PROAIR HFA/PROVENTIL HFA/VENTOLIN HFA) 108 (90 Base) MCG/ACT inhaler Unknown at prn  No Yes   Sig: Inhale 2 puffs into the lungs every 4 hours as needed for shortness of breath / dyspnea or wheezing Inhale 1-2 Puffs every 4 hours as needed for Wheezing.   albuterol (PROVENTIL) (2.5 MG/3ML) 0.083% neb solution Unknown at prn  No Yes   Sig: Take 1 vial (2.5 mg) by nebulization every 4 hours as needed for shortness of breath or wheezing   amiodarone (PACERONE) 200 MG tablet 2024 at am  No Yes   Si tablet (200 mg) by Oral or Feeding Tube route daily   Patient taking differently: Take 200 mg by mouth daily   apixaban ANTICOAGULANT (ELIQUIS) 5 MG tablet 2024 at am  No Yes   Sig: Take 1 tablet (5 mg) by mouth 2 times daily   atorvastatin (LIPITOR) 20 MG tablet 2024 at am Self Yes Yes   Sig: Take 20 mg by mouth daily   clonazePAM (KLONOPIN) 0.5 MG tablet 2024 at am  Yes Yes   Sig: Take 0.5 mg by mouth daily   fluticasone-vilanterol (BREO ELLIPTA) 200-25 MCG/ACT inhaler 2024 at am  Yes  Yes   Sig: Inhale 1 Dose into the lungs daily   ipratropium - albuterol 0.5 mg/2.5 mg/3 mL (DUONEB) 0.5-2.5 (3) MG/3ML neb solution Unknown at prn  No Yes   Sig: Take 1 vial (3 mLs) by nebulization every 6 hours as needed for shortness of breath / dyspnea or wheezing   levalbuterol (XOPENEX) 0.63 MG/3ML neb solution Unknown at prn  Yes Yes   Sig: Inhale 0.63 mg into the lungs every 4 hours as needed   levothyroxine (SYNTHROID/LEVOTHROID) 112 MCG tablet 1/22/2024 at am Self Yes Yes   Sig: Take 112 mcg by mouth daily   losartan (COZAAR) 25 MG tablet 1/22/2024 at am  Yes Yes   Sig: Take 1 tablet by mouth daily   sodium chloride (NEBUSAL) 3 % neb solution 1/22/2024 at am  Yes Yes   Sig: Inhale 4 mLs into the lungs 2 times daily      Facility-Administered Medications: None     Current Facility-Administered Medications   Medication Dose Route Frequency    amiodarone  200 mg Oral Daily    apixaban ANTICOAGULANT  5 mg Oral BID    atorvastatin  20 mg Oral Daily    azithromycin  250 mg Intravenous Q24H    clonazePAM  0.5 mg Oral Daily    diltiazem  10 mg Intravenous Once    fluticasone-vilanterol  1 puff Inhalation Daily    guaiFENesin  600 mg Oral BID    ipratropium - albuterol 0.5 mg/2.5 mg/3 mL  3 mL Nebulization Q3H    levothyroxine  112 mcg Oral Daily    losartan  25 mg Oral Daily    pantoprazole  40 mg Intravenous Daily with breakfast    PARoxetine  20 mg Oral Daily    predniSONE  60 mg Oral Daily    sodium chloride (PF)  3 mL Intracatheter Q8H    umeclidinium  1 puff Inhalation Daily     Current Facility-Administered Medications   Medication Last Rate    [id number]      - MEDICATION INSTRUCTIONS -      - MEDICATION INSTRUCTIONS -      - MEDICATION INSTRUCTIONS -       Allergies   Allergies   Allergen Reactions    Sulfa Antibiotics     Doxycycline Other (See Comments)     Develops chest tightness  Intolerance not allergy    Penicillins Rash     Generalized rash  Rash, Generalized         Social History    reports that  "she has never smoked. She has never used smokeless tobacco. She reports current alcohol use. She reports that she does not use drugs.      Family History   I have reviewed this patient's family history and updated it with pertinent information if needed.  Family History   Problem Relation Age of Onset    Deep Vein Thrombosis (DVT) Mother     Hypertension Mother           Review of Systems   A comprehensive review of system was performed and is negative other than that noted in the HPI or here.     Physical Exam   Vital Signs with Ranges  Temp:  [96  F (35.6  C)-97.7  F (36.5  C)] 97.3  F (36.3  C)  Pulse:  [] 120  Resp:  [13-38] 22  BP: (132-195)/() 132/103  FiO2 (%):  [21 %-30 %] 30 %  SpO2:  [95 %-100 %] 98 %  Wt Readings from Last 4 Encounters:   01/27/24 67 kg (147 lb 11.3 oz)   01/09/24 66 kg (145 lb 8.1 oz)   12/28/23 65 kg (143 lb 4.8 oz)   10/03/23 61.2 kg (135 lb)     I/O last 3 completed shifts:  In: 340 [P.O.:340]  Out: 400 [Urine:400]      Vitals: BP (!) 132/103   Pulse 120   Temp 97.3  F (36.3  C) (Axillary)   Resp 22   Ht 1.6 m (5' 3\")   Wt 67 kg (147 lb 11.3 oz)   SpO2 98%   BMI 26.17 kg/m      Physical Exam:   General -in respiratory distress eyes - No scleral icterus  HEENT - Neck supple, moist mucous membranes  Cardiovascular -irregular S1-S2  Extremities - There is no edema  Respiratory -extensive bilateral wheezes and rales  Skin - No pallor or cyanosis  Gastrointestinal - Non tender and non distended without rebound or guarding  Psych - Appropriate affect   Neurological - No gross motor neurological focal deficits    No lab results found in last 7 days.    Invalid input(s): \"TROPONINIES\"    Recent Labs   Lab 01/27/24  1159 01/27/24  0815 01/27/24  0407 01/26/24  1815 01/26/24  1049 01/25/24  0750 01/24/24  0746 01/22/24  1527   WBC  --   --   --   --  6.6  --  11.5* 10.3   HGB  --   --   --   --  13.5  --  13.7 15.0   MCV  --   --   --   --  101*  --  99 98   PLT  --   --   -- " "  --  190  --  204 201   NA  --   --   --   --  140 138 138 138   POTASSIUM  --   --   --   --  4.3 3.8 4.4 4.3   CHLORIDE  --   --   --   --  101 101 100 100   CO2  --   --   --   --  29 31* 28 25   BUN  --   --   --   --  19.2 17.5 16.5 8.2   CR  --   --   --   --  0.75 0.98* 0.91 0.93   GFRESTIMATED  --   --   --   --  >90 66 72 70   ANIONGAP  --   --   --   --  10 6* 10 13   EDIN  --   --   --   --  9.2 8.7* 9.2 9.3   * 140* 140*   < > 141* 94 116* 126*   ALBUMIN  --   --   --   --   --   --   --  4.4   PROTTOTAL  --   --   --   --   --   --   --  7.2   BILITOTAL  --   --   --   --   --   --   --  0.3   ALKPHOS  --   --   --   --   --   --   --  80   ALT  --   --   --   --   --   --   --  40   AST  --   --   --   --   --   --   --  27    < > = values in this interval not displayed.     Recent Labs   Lab Test 07/08/23  0416 06/30/23  0505   TRIG 111 97     Recent Labs   Lab 01/26/24  1049 01/24/24  0746 01/22/24  1527   WBC 6.6 11.5* 10.3   HGB 13.5 13.7 15.0   HCT 43.2 41.0 44.6   * 99 98    204 201     Recent Labs   Lab 01/27/24  0620 01/26/24 2056 01/26/24  1049   PHV 7.40 7.37 7.35   PO2V 77* 77* 33   PCO2V 53* 55* 62*   HCO3V 33* 32* 34*     Recent Labs   Lab 01/26/24  1049   NTBNPI 245     No results for input(s): \"DD\" in the last 168 hours.  No results for input(s): \"SED\", \"CRP\" in the last 168 hours.  Recent Labs   Lab 01/26/24  1049 01/24/24  0746 01/22/24  1527    204 201     No results for input(s): \"TSH\" in the last 168 hours.  No results for input(s): \"COLOR\", \"APPEARANCE\", \"URINEGLC\", \"URINEBILI\", \"URINEKETONE\", \"SG\", \"UBLD\", \"URINEPH\", \"PROTEIN\", \"UROBILINOGEN\", \"NITRITE\", \"LEUKEST\", \"RBCU\", \"WBCU\" in the last 168 hours.    Imaging:  Recent Results (from the past 48 hour(s))   XR Chest Port 1 View    Narrative    CHEST PORTABLE ONE VIEW   1/26/2024 11:26 AM     HISTORY: COPD exacerbation.  Shortness of breath.     COMPARISON: 1/22/2024.      Impression    IMPRESSION: No " "focal acute airspace disease. Stable bilateral vascular  and interstitial prominence. Normal cardiac silhouette. Stable  ill-defined calcifications consistent with a granulomatous process at  the left apex.     MEHUL HOWE MD         SYSTEM ID:  [id number]       Echo:  No results found for this or any previous visit (from the past 4320 hour(s)).    Clinically Significant Risk Factors                  # Hypertension: Noted on problem list        # Overweight: Estimated body mass index is 26.17 kg/m  as calculated from the following:    Height as of this encounter: 1.6 m (5' 3\").    Weight as of this encounter: 67 kg (147 lb 11.3 oz).            Cardiac Arrhythmia: Atrial fibrillation: Paroxysmal      Thrombocytopenia Including Purpuric, HIT, & Other Platelet Defects: Acquired coagulation factor deficiency      COPD      Chronic Fatigue and Other Debilities: Limitation of activities due to disability                "

## 2024-01-27 NOTE — PLAN OF CARE
ICU End of Shift Summary.  For vital signs and complete assessments, please see documentation flowsheets.      Pertinent assessments: Pt A&O, able to make needs known. Reported anxiety when awake. Tele Afib. On/off helium throughout the night, 1L NC of O2 on. BP hypertensive. Denies pain. Incontinent of urine, up to BSC x1. BS+, colostomy in place with small output.     Major Shift Events: heliox started this shift, pt reports better breathing with helium    Plan (Upcoming Events): tbd  Discharge/Transfer Needs: tbd     Bedside Shift Report Completed : yes  Bedside Safety Check Completed: yes

## 2024-01-27 NOTE — PLAN OF CARE
ICU End of Shift Summary.  For vital signs and complete assessments, please see documentation flowsheets.      Pertinent assessments:   Neuro: A & O x 4. Anxious but cooperative.   Cardiac: Afib.  BP stable.   Resp: SL dim, expiratory wheezes. On continues bipap. Hourly nebs.   GI: ostomy.   : incontinent of bladder.   Skin: blanchable redness to bottom.   Lines: PIV  Drips: none.     Major Shift Events:   Transferred to ICU    Plan (Upcoming Events): TBD. Monitor respiratory status.   Discharge/Transfer Needs: tbd     Bedside Shift Report Completed : yes  Bedside Safety Check Completed: yes

## 2024-01-27 NOTE — PROGRESS NOTES
Pt transferred to ICU due to needing higher level of care related to tightening of chest and breathing difficulties. was present during transfer.

## 2024-01-27 NOTE — PROGRESS NOTES
"RT Note:    Patient switched from non-rebreather mask (running on air after heliox turned off) to BiPAP this AM. Duoneb given x2 this am, patient stated she felt \"more tight\" and anxious after second duoneb. No significant change in breath sounds pre and post neb. Nebulizers changed from duoneb to xopenex by MD. During xopenex neb given at 13:40, patient became very anxious, heart rate increased to 160s, nebulizer stopped. Order changed back to duoneb Q4 by MD. Remains on BiPAP 14/6, 25-35%.  "

## 2024-01-27 NOTE — PROGRESS NOTES
Patient tolerated Heliox at 10L with NRB mask. Patient has required 1L via NC of oxygen for sats hovering around 90% on RA. With 1L sats %. Continues to have upper airway wheeze with some expiratory wheeze in the lungs. Did change patient over to 10L of air via NRB for a break from Heliox, no change in status noted with RR 20-24. HR irregular from . Patient given Albuterol neb Q4 with Duoneb Q8. Some increased aeration noted after treatment. Will continue to monitor for respiratory distress.    Kody Medina, RT on 1/27/2024 at 5:52 AM

## 2024-01-28 ENCOUNTER — ANESTHESIA (OUTPATIENT)
Dept: INTENSIVE CARE | Facility: CLINIC | Age: 59
End: 2024-01-28
Payer: COMMERCIAL

## 2024-01-28 ENCOUNTER — APPOINTMENT (OUTPATIENT)
Dept: GENERAL RADIOLOGY | Facility: CLINIC | Age: 59
End: 2024-01-28
Attending: ANESTHESIOLOGY
Payer: COMMERCIAL

## 2024-01-28 ENCOUNTER — APPOINTMENT (OUTPATIENT)
Dept: GENERAL RADIOLOGY | Facility: CLINIC | Age: 59
End: 2024-01-28
Attending: STUDENT IN AN ORGANIZED HEALTH CARE EDUCATION/TRAINING PROGRAM
Payer: COMMERCIAL

## 2024-01-28 ENCOUNTER — APPOINTMENT (OUTPATIENT)
Dept: GENERAL RADIOLOGY | Facility: CLINIC | Age: 59
End: 2024-01-28
Attending: INTERNAL MEDICINE
Payer: COMMERCIAL

## 2024-01-28 ENCOUNTER — ANESTHESIA EVENT (OUTPATIENT)
Dept: INTENSIVE CARE | Facility: CLINIC | Age: 59
End: 2024-01-28
Payer: COMMERCIAL

## 2024-01-28 LAB
ALLEN'S TEST: YES
ALLEN'S TEST: YES
ANION GAP SERPL CALCULATED.3IONS-SCNC: 14 MMOL/L (ref 7–15)
BASE EXCESS BLDA CALC-SCNC: 6 MMOL/L (ref -3–3)
BASE EXCESS BLDA CALC-SCNC: 6 MMOL/L (ref -3–3)
BASE EXCESS BLDV CALC-SCNC: 6.4 MMOL/L (ref -3–3)
BUN SERPL-MCNC: 36 MG/DL (ref 8–23)
CALCIUM SERPL-MCNC: 9.3 MG/DL (ref 8.8–10.2)
CHLORIDE SERPL-SCNC: 98 MMOL/L (ref 98–107)
COHGB MFR BLD: 89 % (ref 96–97)
COHGB MFR BLD: 90.7 % (ref 96–97)
CREAT SERPL-MCNC: 0.79 MG/DL (ref 0.51–0.95)
DEPRECATED HCO3 PLAS-SCNC: 29 MMOL/L (ref 22–29)
EGFRCR SERPLBLD CKD-EPI 2021: 85 ML/MIN/1.73M2
ERYTHROCYTE [DISTWIDTH] IN BLOOD BY AUTOMATED COUNT: 14 % (ref 10–15)
GLUCOSE BLDC GLUCOMTR-MCNC: 131 MG/DL (ref 70–99)
GLUCOSE BLDC GLUCOMTR-MCNC: 143 MG/DL (ref 70–99)
GLUCOSE BLDC GLUCOMTR-MCNC: 146 MG/DL (ref 70–99)
GLUCOSE BLDC GLUCOMTR-MCNC: 155 MG/DL (ref 70–99)
GLUCOSE BLDC GLUCOMTR-MCNC: 172 MG/DL (ref 70–99)
GLUCOSE BLDC GLUCOMTR-MCNC: 204 MG/DL (ref 70–99)
GLUCOSE SERPL-MCNC: 182 MG/DL (ref 70–99)
HCO3 BLD-SCNC: 34 MMOL/L (ref 21–28)
HCO3 BLD-SCNC: 34 MMOL/L (ref 21–28)
HCO3 BLDV-SCNC: 33 MMOL/L (ref 21–28)
HCT VFR BLD AUTO: 47.8 % (ref 35–47)
HGB BLD-MCNC: 15.4 G/DL (ref 11.7–15.7)
MAGNESIUM SERPL-MCNC: 2.3 MG/DL (ref 1.7–2.3)
MCH RBC QN AUTO: 32.2 PG (ref 26.5–33)
MCHC RBC AUTO-ENTMCNC: 32.2 G/DL (ref 31.5–36.5)
MCV RBC AUTO: 100 FL (ref 78–100)
O2/TOTAL GAS SETTING VFR VENT: 30 %
O2/TOTAL GAS SETTING VFR VENT: 30 %
O2/TOTAL GAS SETTING VFR VENT: 35 %
OXYHGB MFR BLDV: 88 % (ref 70–75)
PCO2 BLD: 62 MM HG (ref 35–45)
PCO2 BLD: 62 MM HG (ref 35–45)
PCO2 BLDV: 52 MM HG (ref 40–50)
PEEP: 5 CM H2O
PEEP: 5 CM H2O
PH BLD: 7.35 [PH] (ref 7.35–7.45)
PH BLD: 7.35 [PH] (ref 7.35–7.45)
PH BLDV: 7.41 [PH] (ref 7.32–7.43)
PHOSPHATE SERPL-MCNC: 3.4 MG/DL (ref 2.5–4.5)
PLATELET # BLD AUTO: 268 10E3/UL (ref 150–450)
PO2 BLD: 58 MM HG (ref 80–105)
PO2 BLD: 62 MM HG (ref 80–105)
PO2 BLDV: 55 MM HG (ref 25–47)
POTASSIUM SERPL-SCNC: 3.9 MMOL/L (ref 3.4–5.3)
RBC # BLD AUTO: 4.79 10E6/UL (ref 3.8–5.2)
SAO2 % BLDA: 87 % (ref 92–100)
SAO2 % BLDA: 89 % (ref 92–100)
SAO2 % BLDV: 89.5 % (ref 70–75)
SODIUM SERPL-SCNC: 141 MMOL/L (ref 135–145)
VIT B12 SERPL-MCNC: 387 PG/ML (ref 232–1245)
WBC # BLD AUTO: 21.5 10E3/UL (ref 4–11)

## 2024-01-28 PROCEDURE — 5A1955Z RESPIRATORY VENTILATION, GREATER THAN 96 CONSECUTIVE HOURS: ICD-10-PCS | Performed by: INTERNAL MEDICINE

## 2024-01-28 PROCEDURE — 200N000001 HC R&B ICU

## 2024-01-28 PROCEDURE — 99232 SBSQ HOSP IP/OBS MODERATE 35: CPT | Performed by: STUDENT IN AN ORGANIZED HEALTH CARE EDUCATION/TRAINING PROGRAM

## 2024-01-28 PROCEDURE — 82805 BLOOD GASES W/O2 SATURATION: CPT | Performed by: EMERGENCY MEDICINE

## 2024-01-28 PROCEDURE — 999N000009 HC STATISTIC AIRWAY CARE

## 2024-01-28 PROCEDURE — 99291 CRITICAL CARE FIRST HOUR: CPT | Performed by: INTERNAL MEDICINE

## 2024-01-28 PROCEDURE — 250N000011 HC RX IP 250 OP 636: Performed by: HOSPITALIST

## 2024-01-28 PROCEDURE — 250N000013 HC RX MED GY IP 250 OP 250 PS 637: Performed by: ANESTHESIOLOGY

## 2024-01-28 PROCEDURE — 250N000011 HC RX IP 250 OP 636: Performed by: INTERNAL MEDICINE

## 2024-01-28 PROCEDURE — 99291 CRITICAL CARE FIRST HOUR: CPT | Performed by: ANESTHESIOLOGY

## 2024-01-28 PROCEDURE — 999N000157 HC STATISTIC RCP TIME EA 10 MIN

## 2024-01-28 PROCEDURE — 36415 COLL VENOUS BLD VENIPUNCTURE: CPT | Performed by: EMERGENCY MEDICINE

## 2024-01-28 PROCEDURE — 250N000011 HC RX IP 250 OP 636: Performed by: NURSE ANESTHETIST, CERTIFIED REGISTERED

## 2024-01-28 PROCEDURE — 94640 AIRWAY INHALATION TREATMENT: CPT | Mod: 76

## 2024-01-28 PROCEDURE — 36600 WITHDRAWAL OF ARTERIAL BLOOD: CPT

## 2024-01-28 PROCEDURE — 999N000073 HC STATISTIC HELIOX THERAPY

## 2024-01-28 PROCEDURE — 82607 VITAMIN B-12: CPT | Performed by: INTERNAL MEDICINE

## 2024-01-28 PROCEDURE — 85027 COMPLETE CBC AUTOMATED: CPT | Performed by: HOSPITALIST

## 2024-01-28 PROCEDURE — 250N000009 HC RX 250: Performed by: INTERNAL MEDICINE

## 2024-01-28 PROCEDURE — 999N000065 XR CHEST PORT 1 VIEW

## 2024-01-28 PROCEDURE — 36569 INSJ PICC 5 YR+ W/O IMAGING: CPT

## 2024-01-28 PROCEDURE — 250N000009 HC RX 250: Performed by: HOSPITALIST

## 2024-01-28 PROCEDURE — 82805 BLOOD GASES W/O2 SATURATION: CPT | Performed by: INTERNAL MEDICINE

## 2024-01-28 PROCEDURE — 71045 X-RAY EXAM CHEST 1 VIEW: CPT

## 2024-01-28 PROCEDURE — 80048 BASIC METABOLIC PNL TOTAL CA: CPT | Performed by: HOSPITALIST

## 2024-01-28 PROCEDURE — 94660 CPAP INITIATION&MGMT: CPT

## 2024-01-28 PROCEDURE — 258N000003 HC RX IP 258 OP 636: Performed by: STUDENT IN AN ORGANIZED HEALTH CARE EDUCATION/TRAINING PROGRAM

## 2024-01-28 PROCEDURE — 250N000009 HC RX 250: Performed by: ANESTHESIOLOGY

## 2024-01-28 PROCEDURE — 94002 VENT MGMT INPAT INIT DAY: CPT

## 2024-01-28 PROCEDURE — 250N000013 HC RX MED GY IP 250 OP 250 PS 637: Performed by: INTERNAL MEDICINE

## 2024-01-28 PROCEDURE — 272N000026 HC KIT POWER PICC TRIPLE LUMEN

## 2024-01-28 PROCEDURE — 94644 CONT INHLJ TX 1ST HOUR: CPT

## 2024-01-28 PROCEDURE — C9113 INJ PANTOPRAZOLE SODIUM, VIA: HCPCS | Performed by: INTERNAL MEDICINE

## 2024-01-28 PROCEDURE — 84100 ASSAY OF PHOSPHORUS: CPT | Performed by: INTERNAL MEDICINE

## 2024-01-28 PROCEDURE — 999N000065 XR ABDOMEN PORT 1 VIEW

## 2024-01-28 PROCEDURE — 94645 CONT INHLJ TX EACH ADDL HOUR: CPT

## 2024-01-28 PROCEDURE — 250N000011 HC RX IP 250 OP 636

## 2024-01-28 PROCEDURE — 370N000003 HC ANESTHESIA WARD SERVICE: Performed by: NURSE ANESTHETIST, CERTIFIED REGISTERED

## 2024-01-28 PROCEDURE — 94640 AIRWAY INHALATION TREATMENT: CPT

## 2024-01-28 PROCEDURE — 83735 ASSAY OF MAGNESIUM: CPT | Performed by: HOSPITALIST

## 2024-01-28 PROCEDURE — 258N000003 HC RX IP 258 OP 636: Performed by: INTERNAL MEDICINE

## 2024-01-28 RX ORDER — FENTANYL CITRATE 50 UG/ML
25 INJECTION, SOLUTION INTRAMUSCULAR; INTRAVENOUS
Status: DISCONTINUED | OUTPATIENT
Start: 2024-01-28 | End: 2024-02-14 | Stop reason: HOSPADM

## 2024-01-28 RX ORDER — ALBUTEROL SULFATE 5 MG/ML
15 SOLUTION, NON-ORAL INHALATION CONTINUOUS
Status: DISCONTINUED | OUTPATIENT
Start: 2024-01-28 | End: 2024-01-28

## 2024-01-28 RX ORDER — AMIODARONE HYDROCHLORIDE 200 MG/1
200 TABLET ORAL DAILY
Status: DISCONTINUED | OUTPATIENT
Start: 2024-01-29 | End: 2024-02-14 | Stop reason: HOSPADM

## 2024-01-28 RX ORDER — LEVOTHYROXINE SODIUM 112 UG/1
112 TABLET ORAL DAILY
Status: DISCONTINUED | OUTPATIENT
Start: 2024-01-29 | End: 2024-02-14 | Stop reason: HOSPADM

## 2024-01-28 RX ORDER — PROPOFOL 10 MG/ML
INJECTION, EMULSION INTRAVENOUS
Status: COMPLETED
Start: 2024-01-28 | End: 2024-01-28

## 2024-01-28 RX ORDER — CLONAZEPAM 0.5 MG/1
0.5 TABLET ORAL DAILY
Status: DISCONTINUED | OUTPATIENT
Start: 2024-01-29 | End: 2024-02-14 | Stop reason: HOSPADM

## 2024-01-28 RX ORDER — NALOXONE HYDROCHLORIDE 0.4 MG/ML
0.4 INJECTION, SOLUTION INTRAMUSCULAR; INTRAVENOUS; SUBCUTANEOUS
Status: DISCONTINUED | OUTPATIENT
Start: 2024-01-28 | End: 2024-02-14 | Stop reason: HOSPADM

## 2024-01-28 RX ORDER — ATORVASTATIN CALCIUM 20 MG/1
20 TABLET, FILM COATED ORAL DAILY
Status: DISCONTINUED | OUTPATIENT
Start: 2024-01-29 | End: 2024-02-14 | Stop reason: HOSPADM

## 2024-01-28 RX ORDER — ACETAMINOPHEN 325 MG/10.15ML
500 LIQUID ORAL EVERY 4 HOURS PRN
Status: DISCONTINUED | OUTPATIENT
Start: 2024-01-28 | End: 2024-02-07

## 2024-01-28 RX ORDER — LIDOCAINE 40 MG/G
CREAM TOPICAL
Status: DISCONTINUED | OUTPATIENT
Start: 2024-01-28 | End: 2024-01-30

## 2024-01-28 RX ORDER — SODIUM CHLORIDE, SODIUM LACTATE, POTASSIUM CHLORIDE, CALCIUM CHLORIDE 600; 310; 30; 20 MG/100ML; MG/100ML; MG/100ML; MG/100ML
INJECTION, SOLUTION INTRAVENOUS CONTINUOUS
Status: DISCONTINUED | OUTPATIENT
Start: 2024-01-28 | End: 2024-02-02

## 2024-01-28 RX ORDER — PROPOFOL 10 MG/ML
INJECTION, EMULSION INTRAVENOUS PRN
Status: DISCONTINUED | OUTPATIENT
Start: 2024-01-28 | End: 2024-01-28

## 2024-01-28 RX ORDER — GUAIFENESIN 200 MG/10ML
200 LIQUID ORAL EVERY 4 HOURS
Status: DISCONTINUED | OUTPATIENT
Start: 2024-01-28 | End: 2024-02-07

## 2024-01-28 RX ORDER — DEXTROSE MONOHYDRATE 100 MG/ML
INJECTION, SOLUTION INTRAVENOUS CONTINUOUS PRN
Status: DISCONTINUED | OUTPATIENT
Start: 2024-01-28 | End: 2024-02-06

## 2024-01-28 RX ORDER — HYDROXYZINE HYDROCHLORIDE 10 MG/1
10 TABLET, FILM COATED ORAL 3 TIMES DAILY PRN
Status: DISCONTINUED | OUTPATIENT
Start: 2024-01-28 | End: 2024-02-14 | Stop reason: HOSPADM

## 2024-01-28 RX ORDER — AMOXICILLIN 250 MG
1 CAPSULE ORAL 2 TIMES DAILY PRN
Status: DISCONTINUED | OUTPATIENT
Start: 2024-01-28 | End: 2024-02-14 | Stop reason: HOSPADM

## 2024-01-28 RX ORDER — PROPOFOL 10 MG/ML
INJECTION, EMULSION INTRAVENOUS
Status: COMPLETED | OUTPATIENT
Start: 2024-01-28 | End: 2024-01-28

## 2024-01-28 RX ORDER — NALOXONE HYDROCHLORIDE 0.4 MG/ML
0.2 INJECTION, SOLUTION INTRAMUSCULAR; INTRAVENOUS; SUBCUTANEOUS
Status: DISCONTINUED | OUTPATIENT
Start: 2024-01-28 | End: 2024-02-14 | Stop reason: HOSPADM

## 2024-01-28 RX ORDER — AMOXICILLIN 250 MG
2 CAPSULE ORAL 2 TIMES DAILY PRN
Status: DISCONTINUED | OUTPATIENT
Start: 2024-01-28 | End: 2024-02-14 | Stop reason: HOSPADM

## 2024-01-28 RX ORDER — PROPOFOL 10 MG/ML
5-75 INJECTION, EMULSION INTRAVENOUS CONTINUOUS
Status: DISCONTINUED | OUTPATIENT
Start: 2024-01-28 | End: 2024-02-05

## 2024-01-28 RX ORDER — PAROXETINE 20 MG/1
20 TABLET, FILM COATED ORAL DAILY
Status: DISCONTINUED | OUTPATIENT
Start: 2024-01-29 | End: 2024-02-14 | Stop reason: HOSPADM

## 2024-01-28 RX ORDER — FUROSEMIDE 10 MG/ML
20 INJECTION INTRAMUSCULAR; INTRAVENOUS EVERY 12 HOURS
Status: COMPLETED | OUTPATIENT
Start: 2024-01-28 | End: 2024-01-28

## 2024-01-28 RX ORDER — METHYLPREDNISOLONE SODIUM SUCCINATE 125 MG/2ML
125 INJECTION, POWDER, LYOPHILIZED, FOR SOLUTION INTRAMUSCULAR; INTRAVENOUS EVERY 12 HOURS
Status: COMPLETED | OUTPATIENT
Start: 2024-01-28 | End: 2024-01-29

## 2024-01-28 RX ADMIN — PROPOFOL 5 MCG/KG/MIN: 10 INJECTION, EMULSION INTRAVENOUS at 04:40

## 2024-01-28 RX ADMIN — APIXABAN 5 MG: 5 TABLET, FILM COATED ORAL at 20:17

## 2024-01-28 RX ADMIN — PROPOFOL 20 MCG/KG/MIN: 10 INJECTION, EMULSION INTRAVENOUS at 16:14

## 2024-01-28 RX ADMIN — FUROSEMIDE 20 MG: 10 INJECTION, SOLUTION INTRAMUSCULAR; INTRAVENOUS at 02:00

## 2024-01-28 RX ADMIN — HYDRALAZINE HYDROCHLORIDE 10 MG: 20 INJECTION INTRAMUSCULAR; INTRAVENOUS at 01:10

## 2024-01-28 RX ADMIN — Medication 15 MG/HR: at 03:11

## 2024-01-28 RX ADMIN — IPRATROPIUM BROMIDE 0.5 MG: 0.5 SOLUTION RESPIRATORY (INHALATION) at 15:35

## 2024-01-28 RX ADMIN — PAROXETINE HYDROCHLORIDE 20 MG: 20 TABLET, FILM COATED ORAL at 14:35

## 2024-01-28 RX ADMIN — METOPROLOL TARTRATE 12.5 MG: 25 TABLET, FILM COATED ORAL at 14:34

## 2024-01-28 RX ADMIN — IPRATROPIUM BROMIDE 0.5 MG: 0.5 SOLUTION RESPIRATORY (INHALATION) at 19:53

## 2024-01-28 RX ADMIN — SODIUM CHLORIDE, POTASSIUM CHLORIDE, SODIUM LACTATE AND CALCIUM CHLORIDE 1000 ML: 600; 310; 30; 20 INJECTION, SOLUTION INTRAVENOUS at 17:12

## 2024-01-28 RX ADMIN — METHYLPREDNISOLONE SODIUM SUCCINATE 125 MG: 125 INJECTION, POWDER, FOR SOLUTION INTRAMUSCULAR; INTRAVENOUS at 14:33

## 2024-01-28 RX ADMIN — IPRATROPIUM BROMIDE 0.5 MG: 0.5 SOLUTION RESPIRATORY (INHALATION) at 12:04

## 2024-01-28 RX ADMIN — PANTOPRAZOLE SODIUM 40 MG: 40 INJECTION, POWDER, FOR SOLUTION INTRAVENOUS at 08:01

## 2024-01-28 RX ADMIN — METHYLPREDNISOLONE SODIUM SUCCINATE 125 MG: 125 INJECTION, POWDER, FOR SOLUTION INTRAMUSCULAR; INTRAVENOUS at 02:02

## 2024-01-28 RX ADMIN — AZITHROMYCIN MONOHYDRATE 250 MG: 500 INJECTION, POWDER, LYOPHILIZED, FOR SOLUTION INTRAVENOUS at 16:15

## 2024-01-28 RX ADMIN — Medication 15 MG/HR: at 04:11

## 2024-01-28 RX ADMIN — Medication 25 MCG/HR: at 05:03

## 2024-01-28 RX ADMIN — IPRATROPIUM BROMIDE AND ALBUTEROL SULFATE 3 ML: .5; 3 SOLUTION RESPIRATORY (INHALATION) at 00:36

## 2024-01-28 RX ADMIN — FUROSEMIDE 20 MG: 10 INJECTION, SOLUTION INTRAMUSCULAR; INTRAVENOUS at 15:06

## 2024-01-28 RX ADMIN — FENTANYL CITRATE 25 MCG: 0.05 INJECTION, SOLUTION INTRAMUSCULAR; INTRAVENOUS at 04:47

## 2024-01-28 RX ADMIN — LORAZEPAM 1 MG: 2 INJECTION INTRAMUSCULAR; INTRAVENOUS at 01:09

## 2024-01-28 RX ADMIN — APIXABAN 5 MG: 5 TABLET, FILM COATED ORAL at 14:42

## 2024-01-28 RX ADMIN — PROPOFOL 60 MG: 10 INJECTION, EMULSION INTRAVENOUS at 04:37

## 2024-01-28 RX ADMIN — IPRATROPIUM BROMIDE AND ALBUTEROL SULFATE 3 ML: .5; 3 SOLUTION RESPIRATORY (INHALATION) at 08:16

## 2024-01-28 RX ADMIN — PROPOFOL 30 MCG/KG/MIN: 10 INJECTION, EMULSION INTRAVENOUS at 07:49

## 2024-01-28 RX ADMIN — ATORVASTATIN CALCIUM 20 MG: 20 TABLET, FILM COATED ORAL at 14:34

## 2024-01-28 RX ADMIN — LORAZEPAM 1 MG: 2 INJECTION INTRAMUSCULAR; INTRAVENOUS at 04:47

## 2024-01-28 RX ADMIN — AMIODARONE HYDROCHLORIDE 200 MG: 200 TABLET ORAL at 14:35

## 2024-01-28 RX ADMIN — PROPOFOL 60 MG: 10 INJECTION, EMULSION INTRAVENOUS at 04:35

## 2024-01-28 RX ADMIN — SODIUM CHLORIDE, POTASSIUM CHLORIDE, SODIUM LACTATE AND CALCIUM CHLORIDE: 600; 310; 30; 20 INJECTION, SOLUTION INTRAVENOUS at 18:38

## 2024-01-28 RX ADMIN — GUAIFENESIN 200 MG: 200 SOLUTION ORAL at 14:34

## 2024-01-28 RX ADMIN — LEVOTHYROXINE SODIUM 112 MCG: 0.11 TABLET ORAL at 14:35

## 2024-01-28 RX ADMIN — CLONAZEPAM 0.5 MG: 0.5 TABLET ORAL at 14:35

## 2024-01-28 RX ADMIN — IPRATROPIUM BROMIDE AND ALBUTEROL SULFATE 3 ML: .5; 3 SOLUTION RESPIRATORY (INHALATION) at 05:19

## 2024-01-28 RX ADMIN — GUAIFENESIN 200 MG: 200 SOLUTION ORAL at 20:17

## 2024-01-28 RX ADMIN — SUCCINYLCHOLINE CHLORIDE 60 MG: 20 INJECTION, SOLUTION INTRAMUSCULAR; INTRAVENOUS at 04:36

## 2024-01-28 ASSESSMENT — ACTIVITIES OF DAILY LIVING (ADL)
ADLS_ACUITY_SCORE: 36
ADLS_ACUITY_SCORE: 36
ADLS_ACUITY_SCORE: 27
ADLS_ACUITY_SCORE: 27
ADLS_ACUITY_SCORE: 40
ADLS_ACUITY_SCORE: 36
ADLS_ACUITY_SCORE: 25
ADLS_ACUITY_SCORE: 36
ADLS_ACUITY_SCORE: 25
ADLS_ACUITY_SCORE: 25
ADLS_ACUITY_SCORE: 44
ADLS_ACUITY_SCORE: 25

## 2024-01-28 NOTE — PROGRESS NOTES
ICU End of Shift Summary.  For vital signs and complete assessments, please see documentation flowsheets.     Pertinent assessments: Patient alert and oriented. Patient SOB. Pt remains on bipap all day. However does not desat when taking off for water or getting to commode.  Patient had increased work of breathing around noon during neb treatment. Patient states nebs make her tight even despite trying different nebs. Pt HR continued to go throughout shift up to 150s to 160s.  Dilt drip started after trying oral and IV push dilt.  Pt appeared more anxious as shift went on- IV ativan givenx2. States atarax does not help. Pt does not want to try bipap off at this time. Patient only had sips of liquids due to being on bipap. No BM.   Major Shift Events: continued on bipap all day, dilt drip started, pt remains in a fib with better hr control, steroids changed to oral form  Plan (Upcoming Events):  wean off bipap, IV azithromycin, continue dilt drip, cardiology and pulm following    Discharge/Transfer Needs: n/a at this time     Bedside Shift Report Completed : y  Bedside Safety Check Completed: y

## 2024-01-28 NOTE — PROGRESS NOTES
Per Radiologist , PICC pulled back 2cm , securecath added and PICC redressed per protocol . PICC ok to use , Humaira bedside RN updated

## 2024-01-28 NOTE — ANESTHESIA PROCEDURE NOTES
Airway       Patient location during procedure: ICU       Procedure Start/Stop Times: 1/28/2024 4:37 AM  Staff -        CRNA: Rafael Kerns APRN CRNA       Performed By: CRNAIndications and Patient Condition       Indications for airway management: airway protection, altered level of consciousness and respiratory insufficiency       Mallampati: I     Induction type:intravenous       Mask difficulty assessment: 1 - vent by mask    Final Airway Details       Final airway type: endotracheal airway       Successful airway: ETT - single  Endotracheal Airway Details        ETT size (mm): 7.0       Cuffed: yes       Successful intubation technique: video laryngoscopy       VL Blade Size: Glidescope 3       Grade View of Cords: 1       Adjucts: stylet       Position: Center       Measured from: lips       Secured at (cm): 22       Bite block used: Molar    Post intubation assessment        ETT secured, Vent settings by primary/ICU team, Primary/ICU team to review CXR, Sedation to be ordered by primary/ICU team and No apparent complications       Placement verified by: capnometry, equal breath sounds, chest rise and x-ray        Number of attempts at approach: 1       Secured with: commercial tube mishra       Ease of procedure: easy       Dentition: Intact    Medication(s) Administered   propofol (DIPRIVAN) injection 10 mg/mL vial - Intravenous   60 mg - 1/28/2024 4:37:00 AM  succinylcholine (ANECTINE) 20 mg/mL injection - Intravenous   60 mg - 1/28/2024 4:37:00 AM  Medication Administration Time: 1/28/2024 4:37 AM

## 2024-01-28 NOTE — PROGRESS NOTES
[facility]    Medicine Progress Note - Hospitalist Service    Date of Admission:  1/22/2024    Assessment & Plan     Acute hypoxemic hypercarbic respiratory failure 2/2 severe COPD Exacerbation  Severe COPD  History of intubation with tracheostomy  Presented with COPD exacerbation; has had frequent exacerbations requiring admission, with this being her third admission in 3 weeks. Discharged 1/10/24  with prednisone taper, and she finished this several days before presentation. She presents again with COPD exacerbation, and had remained relatively well compensated until yesterday when she continued to experience air hunger and a feeling of anxiety/distress, despite seemingly adequate oxygenation and stable gases. She had received continuous nebs, nebs scheduled q2 h, steroids, azithromycin, and benzos without relief. She went into AF with RVR in the evening and was intubated for dyspnea, tachypnea.   -she denied improvement with nebs; suspect continuous use also contributed to increased sympathetic tone, with tachcyardia and anxiety possibly worsening anxiety as well as possibly driving rvr. Will trial atrovent QID   -continue vent management per intensivist  -continue sedation and analgesia  -given her description of subjective events and responses to therapies, would consider adding low dose opioid therapy for air hunger once extubated; bedside RN notes she seems to be very anxious and likley component of anticipatory anxiety. Possibly treating air hunger would help to suppress anxiety that worsens tachypnea, HR, leading to increased metabolic needs and shallow breathing  -would benefit from outpatient palliative support for symptomatic management  -pantoprazole     Afib with RVR  Afib with RVR overnight; pressures softer, holding dilt. She is on amiodarone PTA and given current admission will continue for now and discuss with pulmonary if this is a medication that should be continued given  "her severe lung disease; will continue given RVR and given long half life, prudent to continue for now.  -lopressor 12.5 bid with hold parameters     Diet: Combination Diet Regular Diet Adult    DVT Prophylaxis: DOAC  Hines Catheter: Not present  Lines: None     Cardiac Monitoring: ACTIVE order. Indication: Tachyarrhythmias, acute (48 hours)  Code Status: Full Code      Clinically Significant Risk Factors                  # Hypertension: Noted on problem list        # Overweight: Estimated body mass index is 26.17 kg/m  as calculated from the following:    Height as of this encounter: 1.6 m (5' 3\").    Weight as of this encounter: 67 kg (147 lb 11.3 oz).             Disposition Plan     Expected Discharge Date: 01/28/2024      Destination: home            Joan Haile DO  Hospitalist Service  [facility]  Securely message with [employer] (more info)  Text page via [employer] Paging/Directory   ______________________________________________________________________        Physical Exam   Vital Signs: Temp: 97.1  F (36.2  C) Temp src: Axillary BP: 90/60 Pulse: 97   Resp: 13 SpO2: 99 % O2 Device: Mechanical Ventilator Oxygen Delivery: 30 LPM  Weight: 147 lbs 11.33 oz    General: calm, sedated but rousable.  HEENT: ett tube  Cardiac: tachy, irregular  Respiratory: venilated  GI:  Abdomen soft, nontender, nondistended.  Neurologic: rouses to voice pain, sedated      Medical Decision Making       45 MINUTES SPENT BY ME on the date of service doing chart review, history, exam, documentation & further activities per the note.      Data     I have personally reviewed the following data over the past 24 hrs:    21.5 (H)  \   15.4   / 268     141 98 36.0 (H) /  182 (H)   3.9 29 0.79 \       Imaging results reviewed over the past 24 hrs:   Recent Results (from the past 24 hour(s))   XR Chest Port 1 View    Narrative    EXAM: XR CHEST PORT 1 VIEW  LOCATION: [facility]  DATE: " 1/28/2024    INDICATION: SOB  COMPARISON: 01/09/2024      Impression    IMPRESSION: No change. Heart size and pulmonary vessels are normal. Lungs appear mildly hyperinflated. Grouping of tiny calcifications medial left apex unchanged. No new pneumonia.   XR Chest Port 1 View    Narrative    EXAM: XR CHEST PORT 1 VIEW  LOCATION: [facility]  DATE: 1/28/2024    INDICATION: Endotracheal tube positioning  COMPARISON: 01/28/2024      Impression    IMPRESSION: Stable cardiomediastinal silhouette. Endotracheal tube 5 cm above stevie. Interstitial prominence suggesting mild edema. Small coarse calcifications left apex unchanged. 9 mm nodular density right upper lobe level of anterior  2. Uncertain if this is confluence. Pneumonitis, nodule or granuloma not excluded. Short-term follow-up recommended. Atherosclerotic aorta.

## 2024-01-28 NOTE — CONSULTS
CLINICAL NUTRITION SERVICES - ASSESSMENT NOTE     Nutrition Prescription    RECOMMENDATIONS FOR MDs/PROVIDERS TO ORDER:  Noted pt is intubated/sedated, consider updating diet order from Regular to avoid potential confusion with kitchen staff.     Consider assessing for mitochondrial dysfunction    Malnutrition Status:    Unable to determine, needs nut hx and NFPE    Recommendations already ordered by Registered Dietitian (RD):  Phos lab to r/o risk for refeeding syndrome  BMP to assist with monitoring enteral nutrition tolerance  Given current CBC, suspect micronutrient deficiencies. Ordered vitamin b12/folate panel.     Once OG tube verified in proper position, Initiate TF using Vital High Protein @ 15ml/hr w/ 60ml water flushes every 4 hours and if tolerated and electrolytes WNL, increase by 10ml every 12 hours to goal of 45ml/hr.    At goal, Vital High protein will provide 1080kcals, 94g protein, 120g CHO, 25g fat, 903ml free water, 360ml water from flushes for a total of 1263ml fluid daily.    Propofol at current rate bringing totals to 1291kcals daily    Future/Additional Recommendations:  Adjust TF pending tolerance, propofol rate, labs, respiratory status       REASON FOR ASSESSMENT  Lara Melendez is a/an 58 year old female assessed by the dietitian for Provider Order - Registered Dietitian to Assess and Order TF per Medical Nutrition Therapy Protocol    Patient presents with acute respiratory failure, COPD, A.fib, hypothyroidism, anxiety, HTN, s/p diverting colostomy.    NUTRITION HISTORY  Pt intubated and sedated. NKFA.     CURRENT NUTRITION ORDERS  Diet: Regular  Intake/Tolerance: Pt was eating 75% of meals prior to intubation per nursing records. 491kcals and 21g protein was ordered for only meal received during admission. Pt missed all meals yesterday.     Propofol @ 8ml/hr providing 211 kcals.     LABS  Labs reviewed: -204, PCO2 62, UN 36.0, hematocrit 47.8    MEDICATIONS  Medications reviewed:  "zithromax, lasix, synthroid/levothroid, solu-medrol, protonix, deltasone, diprivan     ANTHROPOMETRICS  Height: 160 cm (5' 3\")  Most Recent Weight: 67 kg (147 lb 11.3 oz)    IBW: 52.4 kg  BMI: Overweight BMI 25-29.9  Weight History:   Wt Readings from Last 30 Encounters:   01/27/24 67 kg (147 lb 11.3 oz)   01/09/24 66 kg (145 lb 8.1 oz)   12/28/23 65 kg (143 lb 4.8 oz)   10/03/23 61.2 kg (135 lb)   07/23/23 60 kg (132 lb 4.4 oz)   04/01/23 68.8 kg (151 lb 9.6 oz)   02/11/23 60 kg (132 lb 4.4 oz)   01/27/2023  61.2 kg (135 lb)    12/02/22 61.3 kg (135 lb 2.3 oz)   09/28/22 63.1 kg (139 lb 1.6 oz)   08/29/22 61.7 kg (136 lb 1.6 oz)   05/17/22 61.2 kg (134 lb 14.4 oz)   04/02/22 58.9 kg (129 lb 12.8 oz)   02/21/22 59 kg (130 lb)   02/04/22 59 kg (130 lb)   02/04/22 59 kg (130 lb)   01/11/22 60.7 kg (133 lb 14.4 oz)       Dosing Weight: 67 kg    ASSESSED NUTRITION NEEDS  Estimated Energy Needs: 1192 kcals/day (Flip State Equation (PSU) 2003b)  Justification: Overweight and Vented  Estimated Protein Needs:  grams protein/day (1.2 - 1.5 grams of pro/kg)  Justification: Maintenance  Estimated Fluid Needs: 1192 ml (1 mL/kcal)   Justification: Maintenance or Per provider pending fluid status    PHYSICAL FINDINGS  See malnutrition section below.  dry       MALNUTRITION:  % Weight Loss:  None noted  % Intake:  Decreased intake does not meet criteria for malnutrition with available information  Subcutaneous Fat Loss:  Unable to assess  Muscle Loss:  Unable to assess  Fluid Retention:  Unable to assess    Malnutrition Diagnosis: Unable to determine due to needs nut hx and NFPE      NUTRITION DIAGNOSIS  Inadequate protein energy intake related to inadequate oral intake secondary to respiratory failure as evidenced by intubation/sedation    INTERVENTIONS  Implementation  Nutrition Education: Not appropriate at this time due to patient condition   Enteral Nutrition - Initiate  Multivitamin/mineral supplement therapy "     Goals  Total avg nutritional intake to meet a minimum of 1192 kcal/kg and 80 g PRO/kg daily (per dosing wt 67 kg).     Monitoring/Evaluation  Progress toward goals will be monitored and evaluated per protocol. TF tolerance, propofol rate, labs, respiratory status, wt

## 2024-01-28 NOTE — PLAN OF CARE
Goal Outcome Evaluation:     Nutrition Prescription    RECOMMENDATIONS FOR MDs/PROVIDERS TO ORDER:  Noted pt is intubated/sedated, consider updating diet order from Regular to avoid potential confusion with kitchen staff.     Consider assessing for mitochondrial dysfunction    Malnutrition Status:    Unable to determine, needs nut hx and NFPE    Recommendations already ordered by Registered Dietitian (RD):  Phos lab to r/o risk for refeeding syndrome  BMP to assist with monitoring enteral nutrition tolerance  Given current CBC, suspect micronutrient deficiencies. Ordered vitamin b12/folate panel.     Once OG tube verified in proper position, Initiate TF using Vital High Protein @ 15ml/hr w/ 60ml water flushes every 4 hours and if tolerated and electrolytes WNL, increase by 10ml every 12 hours to goal of 45ml/hr.    At goal, Vital High protein will provide 1080kcals, 94g protein, 120g CHO, 25g fat, 903ml free water, 360ml water from flushes for a total of 1263ml fluid daily.    Propofol at current rate bringing totals to 1291kcals daily    Future/Additional Recommendations:  Adjust TF pending tolerance, propofol rate, labs, respiratory status

## 2024-01-28 NOTE — PROGRESS NOTES
[facility] ICU VENTILATOR RESPIRATORY NOTE  Date of Admission: 1/22/24  Date of Intubation (most recent): 1/28/24  Reason for Mechanical Ventilation: Respiratory failure  Number of Days on Mechanical Ventilation: 1  Vent Mode: CMV/AC  (Continuous Mandatory Ventilation/ Assist Control)  FiO2 (%): 30 %  Resp Rate (Set): 12 breaths/min  Tidal Volume (Set, mL): 350 mL  PEEP (cm H2O): 5 cmH2O  Resp: 22     Significant Events Today: Failed continuous Albuterol neb with HFNC and 5L Heliox. No change in WOB and patient was intubated and placed on ventilator.   ABG Results:   Recent Labs   Lab 01/28/24  0552 01/28/24  0405 01/27/24  0620 01/26/24 2056   PH 7.35  --   --   --    PCO2 62*  --   --   --    PO2 58*  --   --   --    HCO3 34*  --   --   --    O2PER 30 35 30 30      ETT appearance on chest x-ray: Pending    Plan:  Continue with ventilator    RT Dre on 1/28/2024 at 6:10 AM

## 2024-01-28 NOTE — PROGRESS NOTES
ICU End of Shift Summary.  For vital signs and complete assessments, please see documentation flowsheets.      Pertinent assessments: Patient sedated and on vent. Sedation decreased. Awakes to voice but does not follow commands. Pt remains in a fib but now controlled.   Dilt drip stopped.  25 ccs out of ostomy. Hines with borderline urine output.   Major Shift Events: dilt drip stopped, sedation decreased greatly, blood pressures much softer- bolus given, IV lasix completed. Did not have much response from lasix.   Plan (Upcoming Events):  wean 02, IV azithromycin, cardiology and pulm following    Discharge/Transfer Needs: n/a at this time      Bedside Shift Report Completed : y  Bedside Safety Check Completed: y

## 2024-01-28 NOTE — PLAN OF CARE
Right wrist and Left wrist restraints initiated on patient on 1/28/2024 at 4:45 AM    Clinical Justification: Pulling lines, pulling tubes, and pulling equipment  Less Restrictive Alternative: De-escalation, Reorientation, Repositioning, Re-evaluate equipment  Attending Physician Notified: MD ordered restraint,     Order received: Yes     Family Notification: Other   Criteria explained to Patient  Patient's Response: No evidence of learning  Restraint care Plan initiated: Yes    Santa De La Torre RN

## 2024-01-28 NOTE — PROVIDER NOTIFICATION
0130 - Pt reporting increased difficulty breathing, BP also elevated. Ativan given and hydralazine given with minimal effect per pt. Telehub called, chest xray ordered and completed. IV lasix and IV solumedrol ordered, pt agreeable to taking, given. Dr. Marlon pastrana'd into room- plan to start pt on continuous nebs and start heliox treatment. RT notified of plan.     0400 - pt on HFNC with 5L Helium bleed and continuous neb for approx 1 hour at this point. Pt states that this has not helped and has significant WOB + SOB. Tele[facility] notified. Yudi'd into room - plan to intubate pt, pt agreeable with plan. Pt also attempted to call  at this time. RT aware of plan, CRNA contacted for intubation. Pt intubated at approx 0430.

## 2024-01-28 NOTE — PLAN OF CARE
ICU End of Shift Summary.  For vital signs and complete assessments, please see documentation flowsheets.      Pertinent assessments: Pt initially A&O, anxious, now sedated on prop and fent. Intubated. Creamy/yellow secretions noted from inline ETT suctioning. Tele A-fib, rates 110s-120s, up to 140s at times when pt is anxious. Remains on dilt gtt at 15 mg/hr. BP hypertensive. Afebrile, denied pain. Incontinent of urine, purewick in place, good urine output post lasix. BS+, NPO, output via colostomy.     Attempted to call pts  this AM to give update on pts status, no answer.       Major Shift Events:    -Pt on Bipap most of shift, attempted HFNC - pt tolerated for ~30 mins; 0130 pt having increased SOB and WOB, gave ativan x1 w/o relief; telehub contacted, tried helium and continuous neb for approx 1 hr w/o relief; intubated around 0430  - Propofol and fent for sedation    -Chest xray x2  -VBG and ABGs drawn  -IV lasix started and given, IV solumedrol given - pt agreeable to both       Plan (Upcoming Events): tbd  Discharge/Transfer Needs: tbd     Bedside Shift Report Completed : yes  Bedside Safety Check Completed: yes    Right wrist and Left wrist restraints continued 1/28/2024    Clinical Justification: Pulling lines, pulling tubes, and pulling equipment  Less Restrictive Alternative: De-escalation, Reorientation, Repositioning, Re-evaluate equipment  Attending Physician Notified: MD ordered restraint,     New orders placed Yes  Length of Order: 1 Day      Santa De La Torre RN

## 2024-01-28 NOTE — PROGRESS NOTES
"Cardiology Progress Note  Doyle Polanco MD         Assessment and Plan:        Acute respiratory failure due to hypoxia secondary to COPD exacerbation and hypercarbia  Atrial fibrillation with rapid ventricular rate    Discussion  Overnight, patient's respiratory status worsened significantly and needed to be intubated.  She is on fentanyl infusion for analgesia.    Atrial fibrillation ventricular rate is somewhat better but still mildly increased.  The intensive care team has decided to stop her diltiazem drip and start metoprolol as her blood pressures were somewhat soft.  I assume that the pulmonary and intensive care team does not believe that metoprolol is contraindicated due to her severe COPD.  I suspect the heart rates will improve once her respiratory status improves.    I still would recommend reconsulting pulmonary to discuss whether she can be on amiodarone and is it safe for us to continue that.  She is on 200 mg/day.    Continue anticoagulation.    Critical care time of 35 minutes spent in evaluation and management of this patient with acute respiratory failure requiring mechanical ventilation, atrial fibrillation with rapid ventricular rate including chart review, review of labs and EKG tracings.                   Interval History:     Intubated and sedated and doing well; no cp, sob, n/v/d, or abd pain.          Review of Systems:     Review of systems is not applicable to this patient          Physical Exam:        Blood pressure 115/85, pulse 117, temperature 97.9  F (36.6  C), temperature source Axillary, resp. rate 12, height 1.6 m (5' 3\"), weight 67 kg (147 lb 11.3 oz), SpO2 96%.  Vitals:    01/22/24 1833 01/27/24 0600   Weight: 67 kg (147 lb 9.6 oz) 67 kg (147 lb 11.3 oz)     Weights since admission  Vitals:    01/22/24 1833 01/27/24 0600   Weight: 67 kg (147 lb 9.6 oz) 67 kg (147 lb 11.3 oz)     Vital Signs with Ranges  Temp:  [97.1  F (36.2  C)-97.9  F (36.6  C)] 97.9  F (36.6 "  C)  Pulse:  [] 117  Resp:  [12-50] 12  BP: ()/() 115/85  FiO2 (%):  [30 %-45 %] 40 %  SpO2:  [67 %-100 %] 96 %  I/O's Last 24 hours  I/O last 3 completed shifts:  In: 217.68 [I.V.:217.68]  Out: 750 [Urine:650; Stool:100]  Net I/O since admission  01/23 0700 - 01/28 0659  In: 2477.68 [P.O.:2260; I.V.:217.68]  Out: 3225 [Urine:3000]  Net: -747.32    EXAM:    Constitutional:   Intubated and sedated     Lungs:   Decreased air entry in the lower zones     Cardiovascular:   irregularly irregular rhythm     Extremities and Back:   No edema            Medications:         amiodarone  200 mg Oral Daily    apixaban ANTICOAGULANT  5 mg Oral BID    atorvastatin  20 mg Oral Daily    azithromycin  250 mg Intravenous Q24H    clonazePAM  0.5 mg Oral Daily    furosemide  20 mg Intravenous Q12H    guaiFENesin  200 mg Oral or Feeding Tube Q4H    ipratropium  0.5 mg Nebulization 4x daily    levothyroxine  112 mcg Oral Daily    [Held by provider] losartan  25 mg Oral Daily    methylPREDNISolone  125 mg Intravenous Q12H    metoprolol tartrate  12.5 mg Oral BID    pantoprazole  40 mg Per Feeding Tube QAM AC    Or    pantoprazole  40 mg Intravenous QAM AC    PARoxetine  20 mg Oral Daily    sodium chloride (PF)  3 mL Intracatheter Q8H            Data:      All new lab and imaging data was reviewed.   Recent Labs   Lab Test 01/28/24  0405 01/26/24  1049 01/24/24  0746 06/21/23  0007 06/21/23  0003 03/29/23  0553 03/26/23  0618 12/01/22  2159 10/26/22  0928   WBC 21.5* 6.6 11.5*   < > 11.8*   < > 5.8   < > 3.9*   HGB 15.4 13.5 13.7   < > 13.5   < > 11.3*   < > 13.7    101* 99   < > 102*   < > 106*   < > 103*    190 204   < > 258   < > 231   < > 133*   INR  --   --   --   --  1.04  --  0.97  --  0.91    < > = values in this interval not displayed.      Recent Labs   Lab Test 01/28/24  1218 01/28/24  0800 01/28/24  0405 01/26/24  1815 01/26/24  1049 01/25/24  0750   NA  --   --  141  --  140 138   POTASSIUM  --   " --  3.9  --  4.3 3.8   CHLORIDE  --   --  98  --  101 101   CO2  --   --  29  --  29 31*   BUN  --   --  36.0*  --  19.2 17.5   CR  --   --  0.79  --  0.75 0.98*   ANIONGAP  --   --  14  --  10 6*   EDIN  --   --  9.3  --  9.2 8.7*   * 155* 182*   < > 141* 94    < > = values in this interval not displayed.     No lab results found.    Invalid input(s): \"TROP\", \"TROPONINIES\"         Imaging:   Recent Results (from the past 24 hour(s))   XR Chest Port 1 View    Narrative    EXAM: XR CHEST PORT 1 VIEW  LOCATION: [facility]  DATE: 1/28/2024    INDICATION: SOB  COMPARISON: 01/09/2024      Impression    IMPRESSION: No change. Heart size and pulmonary vessels are normal. Lungs appear mildly hyperinflated. Grouping of tiny calcifications medial left apex unchanged. No new pneumonia.   XR Chest Port 1 View    Narrative    EXAM: XR CHEST PORT 1 VIEW  LOCATION: [facility]  DATE: 1/28/2024    INDICATION: Endotracheal tube positioning  COMPARISON: 01/28/2024      Impression    IMPRESSION: Stable cardiomediastinal silhouette. Endotracheal tube 5 cm above stevie. Interstitial prominence suggesting mild edema. Small coarse calcifications left apex unchanged. 9 mm nodular density right upper lobe level of anterior  2. Uncertain if this is confluence. Pneumonitis, nodule or granuloma not excluded. Short-term follow-up recommended. Atherosclerotic aorta.      "

## 2024-01-28 NOTE — PROGRESS NOTES
An ABG was completed on the pt's L radial @ 0550 on an FIO2 of 30.  Pressure was held until bleeding stopped.  No complications noted during the procedure.     Kody Medina, RT on 1/28/2024 at 5:54 AM

## 2024-01-28 NOTE — PROGRESS NOTES
Assessment: Ms. Melendez is a 58 year old lady with a medical history of COPD c/b frequent exacerbations, atrial fibrillation, chronic anti-coagulation with apixaban, HTN, anxiety who is admitted to the ICU 1/27 with COPD exacerbation requiring intubation and mechanical ventilation. Today, Ms. Melendez presents critically ill with acute respiratory failure with hypercapnia.     I personally reviewed vital signs, medications, labs and imaging.    Active problems and current treatments include:    Acute respiratory failure with hypercapnia-Due to COPD exacerbation, continue solumedrol, furosemide to optimize respiratory function, patient appears air hungry on the ventilator-settings adjusted to maximize comfort, continue fentanyl infusion for analgesia, increase dose today in the setting of air hunger.  Atrial fibrillation with rapid ventricular response-Rates controlled using diltiazem infusion, discontinue diltiazem in the setting of relatively reduced blood pressures, initiate metoprolol for rate control, continue prior to admission apixaban for anti-coagulation.  ID-Completed course of levofloxacin, continue azithromycin for COPD exacerbation, continue to monitor.  Nutrition-Initiate tube feeding to support nutrition during critical illness.  Prophylaxis-PPI, apixaban as above     I personally managed the ventilator, hemodynamics, sedation, analgesia, metabolic abnormalities and nutritional status.      I spent 34 minutes of critical care time exclusive of procedures evaluating and managing this patient, discussing with the consultants, and updating the patient and family.    Inessa Beckford M.D.

## 2024-01-28 NOTE — PROGRESS NOTES
"Tele-Intensivist note    Called to see patient as she was in respiratory distress. She is located in the ICU at [facility] and I am in the Tele-ICU at [facility].     On AV link she had bipap mask on FIO2 30% 14/6, and she was clearly quite dyspneic but able to respond appropriately to questions; she was not talking with mask in place.     BP (!) 199/107   Pulse (!) 124   Temp 97.1  F (36.2  C) (Axillary)   Resp 24   Ht 1.6 m (5' 3\")   Wt 67 kg (147 lb 11.3 oz)   SpO2 92%   BMI 26.17 kg/m      I ordered a CXR which was reviewed which appeared clear without infiltrates though was poor quality due to motion artifacts; it was unchanged from CXR taken 1-2 days earlier.     She was on duonebs, bronchodillators, Prednisone daily dose, and had completed a short course of Levaquin (750 daily for 3 days) and zithromx.     It is my impression that she has a severe COPD exacerbation, and may be mildly volume overloaded. I added the following 4 therapies:    Solumedrol 125 q12 for 4 doses  Lasix 20 mgms IV q12 for 2 doses  Continuous albuterol neb once (15 mgms)  Heliox treatment qid     I spoke with patient and bedside staff via the AV link. I let her know the plan (above) and let her know that I was concerned that she may need ventilator support if it does not turn around imminently, and she was understanding and accepting.     I spent 35 minutes of critical care time with her today.     Floratiffaniealberto   January 28, 2024    Addendum  Patient reassessed via AV link and still very dyspneic after above. She is accepting of intubation and is ready to be intubated. This was discussed with bedside staff; anesthesia called and RT notified. Vent orders entered along with the following; I stayed on AV link during intubation to lend assistance.       -propofol and fentanyl for sedation  -PICC line ordered  -OGT ordered  -dietician consult to initiate and manage enteral nutrition   -post-intubation CXR    I spent an " additional 40 minutes of critical cafe time with her today    Bryant london  January 28, 2024

## 2024-01-28 NOTE — PROCEDURES
[facility]    Triple Lumen PICC Placement    Date/Time: 1/28/2024 11:41 AM    Performed by: Ashwini Lamas RN  Authorized by: Ross Mason MD  Indications: vascular access      UNIVERSAL PROTOCOL   Site Marked: Yes  Prior Images Obtained and Reviewed:  Yes  Required items: Required blood products, implants, devices and special equipment available    Patient identity confirmed:  Verbally with patient, arm band, provided demographic data and hospital-assigned identification number  NA - No sedation, light sedation, or local anesthesia  Confirmation Checklist:  Patient's identity using two indicators, relevant allergies, procedure was appropriate and matched the consent or emergent situation and correct equipment/implants were available  Time out: Immediately prior to the procedure a time out was called (ROGERIO RN)    Universal Protocol: the [facility] Universal Protocol was followed    Preparation: Patient was prepped and draped in usual sterile fashion       ANESTHESIA    Anesthesia:  Local infiltration  Local Anesthetic:  Lidocaine 1% without epinephrine  Anesthetic Total (mL):  2      SEDATION  Patient Sedated: Yes    Sedation Type:  Deep  Sedation:  Propofol and fentanyl (bedside nurse monitoring vs and status of ventilater)  Vital signs: Vital signs monitored during sedation        Preparation: skin prepped with ChloraPrep  Type of line used: PICC  Catheter type: triple lumen  Lumen Identification: Red, Gray and White  Catheter size: 5 Fr  Brand: Bard  Placement method: venipuncture, MST, ultrasound and tip navigation system  Number of attempts: 1  Difficulty threading catheter: no  Successful placement: yes  Orientation: left    Location: brachial vein (lateral)  Tip Location: SVC/RA Junction  Arm circumference: adults 10 cm  Extremity circumference: 24  Visible catheter length: 0  Total catheter length: 44  Dressing and securement: adhesive securement  device, blood cleaned with CHG, alcohol impregnated caps, chlorhexidine disc applied, dressing applied, gloves changed prior to final dressing, line secured, transparent dressing, sterile dressing applied, securement device, site cleansed and statlock  Post procedure assessment: blood return through all ports, free fluid flow and placement verified by x-ray  PROCEDURE   Patient Tolerance:  Patient tolerated the procedure well with no immediate complicationsDescribe Procedure: Patient in atrial fib . Unable to verify with Sherkp 3cg technology. Xray done with intensivist stating PICC in correct position. Awaiting radiologist read for verification. Bedside nurse Humaira updated .

## 2024-01-28 NOTE — ANESTHESIA CARE TRANSFER NOTE
Patient: Lara Melendez    Procedure: * No procedures listed *       Diagnosis: * No pre-op diagnosis entered *  Diagnosis Additional Information: No value filed.    Anesthesia Type:   No value filed.     Note:    Oropharynx: endotracheal tube in place  Level of Consciousness: unresponsive  Patient oxygen source: per attending MD, patient placed on ventilator.    Independent Airway: airway patency not satisfactory and stable  Dentition: dentition unchanged  Vital Signs Stable: post-procedure vital signs reviewed and stable  Report to RN Given: handoff report given  Patient transferred to: ICU  Comments: Anesthesia called to ICU for Dr. Benjamin patient in respiratory distress, labored breathing, IV sedation, Glidescope times one, 7.0 OET placed, BBS=, taped ETCO2 +, chest x-ray pending, VVS, placed on venilator.        Vitals:  Vitals Value Taken Time   /115 01/28/24 0454   Temp     Pulse 116 01/28/24 0457   Resp 29 01/28/24 0457   SpO2 99 % 01/28/24 0457   Vitals shown include unfiled device data.    Electronically Signed By: ZACHARY Freitas CRNA  January 28, 2024  4:58 AM

## 2024-01-28 NOTE — PROGRESS NOTES
[facility] ICU VENTILATOR RESPIRATORY NOTE    Date of Admission: 1/22/24    Date of Intubation (most recent): 1/28/24    Reason for Mechanical Ventilation: respiratory failure    Number of Days on Mechanical Ventilation: 1    Significant Events Today: ETT advanced 2cm this AM, now secured at 25cm at teeth. Vent settings changed to PC/SIMV by MD this AM. Patient not tolerating this afternoon, low tidal volume and minute volume. Air trapping and pressure limiting. Changed back to CMV settings.    ABG Results:   Recent Labs   Lab 01/28/24  0552 01/28/24  0405 01/27/24  0620 01/26/24 2056   PH 7.35  7.35  --   --   --    PCO2 62*  62*  --   --   --    PO2 62*  58*  --   --   --    HCO3 34*  34*  --   --   --    O2PER 30  30 35 30 30       ETT appearance on chest x-ray: stable position    Plan:  Patient remains on ventilator with settings of:  Vent Mode: (S) CMV/AC  (Continuous Mandatory Ventilation/ Assist Control)  FiO2 (%): 40 %  Resp Rate (Set): (S) 15 breaths/min  Tidal Volume (Set, mL): 350 mL  PEEP (cm H2O): 5 cmH2O  Pressure Support (cm H2O): 10 cmH2O  Inspiratory Pressure (cm H2O) (Drager Norma): 20  Resp: 15

## 2024-01-29 ENCOUNTER — APPOINTMENT (OUTPATIENT)
Dept: GENERAL RADIOLOGY | Facility: CLINIC | Age: 59
End: 2024-01-29
Attending: SURGERY
Payer: COMMERCIAL

## 2024-01-29 LAB
ANION GAP SERPL CALCULATED.3IONS-SCNC: 4 MMOL/L (ref 7–15)
ATRIAL RATE - MUSE: 131 BPM
BASE EXCESS BLDV CALC-SCNC: 5.4 MMOL/L (ref -3–3)
BUN SERPL-MCNC: 44.1 MG/DL (ref 8–23)
CALCIUM SERPL-MCNC: 8.7 MG/DL (ref 8.8–10.2)
CHLORIDE SERPL-SCNC: 102 MMOL/L (ref 98–107)
CREAT SERPL-MCNC: 1.01 MG/DL (ref 0.51–0.95)
DEPRECATED HCO3 PLAS-SCNC: 33 MMOL/L (ref 22–29)
DIASTOLIC BLOOD PRESSURE - MUSE: NORMAL MMHG
EGFRCR SERPLBLD CKD-EPI 2021: 63 ML/MIN/1.73M2
FOLATE SERPL-MCNC: 6.6 NG/ML (ref 4.6–34.8)
GLUCOSE BLDC GLUCOMTR-MCNC: 132 MG/DL (ref 70–99)
GLUCOSE BLDC GLUCOMTR-MCNC: 133 MG/DL (ref 70–99)
GLUCOSE BLDC GLUCOMTR-MCNC: 134 MG/DL (ref 70–99)
GLUCOSE BLDC GLUCOMTR-MCNC: 138 MG/DL (ref 70–99)
GLUCOSE BLDC GLUCOMTR-MCNC: 151 MG/DL (ref 70–99)
GLUCOSE BLDC GLUCOMTR-MCNC: 151 MG/DL (ref 70–99)
GLUCOSE BLDC GLUCOMTR-MCNC: 154 MG/DL (ref 70–99)
GLUCOSE SERPL-MCNC: 150 MG/DL (ref 70–99)
HCO3 BLDV-SCNC: 34 MMOL/L (ref 21–28)
INTERPRETATION ECG - MUSE: NORMAL
MAGNESIUM SERPL-MCNC: 2.7 MG/DL (ref 1.7–2.3)
O2/TOTAL GAS SETTING VFR VENT: 35 %
OXYHGB MFR BLDV: 95 % (ref 70–75)
P AXIS - MUSE: NORMAL DEGREES
PCO2 BLDV: 72 MM HG (ref 40–50)
PH BLDV: 7.28 [PH] (ref 7.32–7.43)
PHOSPHATE SERPL-MCNC: 4.3 MG/DL (ref 2.5–4.5)
PO2 BLDV: 95 MM HG (ref 25–47)
POTASSIUM SERPL-SCNC: 4.5 MMOL/L (ref 3.4–5.3)
PR INTERVAL - MUSE: NORMAL MS
QRS DURATION - MUSE: 70 MS
QT - MUSE: 292 MS
QTC - MUSE: 455 MS
R AXIS - MUSE: 57 DEGREES
SAO2 % BLDV: 96.7 % (ref 70–75)
SODIUM SERPL-SCNC: 139 MMOL/L (ref 135–145)
SYSTOLIC BLOOD PRESSURE - MUSE: NORMAL MMHG
T AXIS - MUSE: 266 DEGREES
VENTRICULAR RATE- MUSE: 146 BPM

## 2024-01-29 PROCEDURE — 99232 SBSQ HOSP IP/OBS MODERATE 35: CPT | Performed by: STUDENT IN AN ORGANIZED HEALTH CARE EDUCATION/TRAINING PROGRAM

## 2024-01-29 PROCEDURE — C9113 INJ PANTOPRAZOLE SODIUM, VIA: HCPCS | Performed by: INTERNAL MEDICINE

## 2024-01-29 PROCEDURE — 94640 AIRWAY INHALATION TREATMENT: CPT | Mod: 76

## 2024-01-29 PROCEDURE — 84100 ASSAY OF PHOSPHORUS: CPT | Performed by: INTERNAL MEDICINE

## 2024-01-29 PROCEDURE — 250N000013 HC RX MED GY IP 250 OP 250 PS 637: Performed by: STUDENT IN AN ORGANIZED HEALTH CARE EDUCATION/TRAINING PROGRAM

## 2024-01-29 PROCEDURE — 999N000009 HC STATISTIC AIRWAY CARE

## 2024-01-29 PROCEDURE — 80048 BASIC METABOLIC PNL TOTAL CA: CPT | Performed by: INTERNAL MEDICINE

## 2024-01-29 PROCEDURE — 83735 ASSAY OF MAGNESIUM: CPT | Performed by: HOSPITALIST

## 2024-01-29 PROCEDURE — 258N000003 HC RX IP 258 OP 636: Performed by: INTERNAL MEDICINE

## 2024-01-29 PROCEDURE — 200N000001 HC R&B ICU

## 2024-01-29 PROCEDURE — 258N000003 HC RX IP 258 OP 636: Performed by: STUDENT IN AN ORGANIZED HEALTH CARE EDUCATION/TRAINING PROGRAM

## 2024-01-29 PROCEDURE — 999N000157 HC STATISTIC RCP TIME EA 10 MIN

## 2024-01-29 PROCEDURE — 71045 X-RAY EXAM CHEST 1 VIEW: CPT

## 2024-01-29 PROCEDURE — 250N000011 HC RX IP 250 OP 636: Performed by: ANESTHESIOLOGY

## 2024-01-29 PROCEDURE — 250N000011 HC RX IP 250 OP 636: Performed by: INTERNAL MEDICINE

## 2024-01-29 PROCEDURE — 99291 CRITICAL CARE FIRST HOUR: CPT | Performed by: SURGERY

## 2024-01-29 PROCEDURE — 82746 ASSAY OF FOLIC ACID SERUM: CPT | Performed by: INTERNAL MEDICINE

## 2024-01-29 PROCEDURE — 82805 BLOOD GASES W/O2 SATURATION: CPT | Performed by: INTERNAL MEDICINE

## 2024-01-29 PROCEDURE — 250N000013 HC RX MED GY IP 250 OP 250 PS 637: Performed by: INTERNAL MEDICINE

## 2024-01-29 PROCEDURE — 250N000013 HC RX MED GY IP 250 OP 250 PS 637: Performed by: ANESTHESIOLOGY

## 2024-01-29 PROCEDURE — 94640 AIRWAY INHALATION TREATMENT: CPT

## 2024-01-29 PROCEDURE — 94003 VENT MGMT INPAT SUBQ DAY: CPT

## 2024-01-29 PROCEDURE — 250N000009 HC RX 250: Performed by: ANESTHESIOLOGY

## 2024-01-29 PROCEDURE — 99232 SBSQ HOSP IP/OBS MODERATE 35: CPT | Mod: FS | Performed by: NURSE PRACTITIONER

## 2024-01-29 PROCEDURE — 99232 SBSQ HOSP IP/OBS MODERATE 35: CPT | Performed by: INTERNAL MEDICINE

## 2024-01-29 RX ORDER — AMINO ACIDS/PROTEIN HYDROLYS 11G-40/45
1 LIQUID IN PACKET (ML) ORAL DAILY
Status: DISCONTINUED | OUTPATIENT
Start: 2024-01-29 | End: 2024-01-31

## 2024-01-29 RX ORDER — GUAIFENESIN 600 MG/1
15 TABLET, EXTENDED RELEASE ORAL DAILY
Status: DISCONTINUED | OUTPATIENT
Start: 2024-01-29 | End: 2024-02-06

## 2024-01-29 RX ADMIN — METHYLPREDNISOLONE SODIUM SUCCINATE 125 MG: 125 INJECTION, POWDER, FOR SOLUTION INTRAMUSCULAR; INTRAVENOUS at 01:17

## 2024-01-29 RX ADMIN — METOPROLOL TARTRATE 12.5 MG: 25 TABLET, FILM COATED ORAL at 20:28

## 2024-01-29 RX ADMIN — CLONAZEPAM 0.5 MG: 0.5 TABLET ORAL at 08:44

## 2024-01-29 RX ADMIN — GUAIFENESIN 200 MG: 200 SOLUTION ORAL at 12:43

## 2024-01-29 RX ADMIN — GUAIFENESIN 200 MG: 200 SOLUTION ORAL at 04:15

## 2024-01-29 RX ADMIN — Medication 1 PACKET: at 12:47

## 2024-01-29 RX ADMIN — SODIUM CHLORIDE, POTASSIUM CHLORIDE, SODIUM LACTATE AND CALCIUM CHLORIDE: 600; 310; 30; 20 INJECTION, SOLUTION INTRAVENOUS at 04:20

## 2024-01-29 RX ADMIN — GUAIFENESIN 200 MG: 200 SOLUTION ORAL at 20:27

## 2024-01-29 RX ADMIN — Medication 150 MCG/HR: at 17:38

## 2024-01-29 RX ADMIN — LEVOTHYROXINE SODIUM 112 MCG: 0.11 TABLET ORAL at 08:44

## 2024-01-29 RX ADMIN — IPRATROPIUM BROMIDE 0.5 MG: 0.5 SOLUTION RESPIRATORY (INHALATION) at 07:34

## 2024-01-29 RX ADMIN — IPRATROPIUM BROMIDE 0.5 MG: 0.5 SOLUTION RESPIRATORY (INHALATION) at 15:38

## 2024-01-29 RX ADMIN — ATORVASTATIN CALCIUM 20 MG: 20 TABLET, FILM COATED ORAL at 08:44

## 2024-01-29 RX ADMIN — APIXABAN 5 MG: 5 TABLET, FILM COATED ORAL at 20:27

## 2024-01-29 RX ADMIN — APIXABAN 5 MG: 5 TABLET, FILM COATED ORAL at 08:44

## 2024-01-29 RX ADMIN — PROPOFOL 30 MCG/KG/MIN: 10 INJECTION, EMULSION INTRAVENOUS at 20:28

## 2024-01-29 RX ADMIN — PROPOFOL 30 MCG/KG/MIN: 10 INJECTION, EMULSION INTRAVENOUS at 13:34

## 2024-01-29 RX ADMIN — GUAIFENESIN 200 MG: 200 SOLUTION ORAL at 08:44

## 2024-01-29 RX ADMIN — IPRATROPIUM BROMIDE 0.5 MG: 0.5 SOLUTION RESPIRATORY (INHALATION) at 11:08

## 2024-01-29 RX ADMIN — Medication 15 ML: at 12:43

## 2024-01-29 RX ADMIN — PANTOPRAZOLE SODIUM 40 MG: 40 INJECTION, POWDER, FOR SOLUTION INTRAVENOUS at 08:44

## 2024-01-29 RX ADMIN — GUAIFENESIN 200 MG: 200 SOLUTION ORAL at 00:39

## 2024-01-29 RX ADMIN — METHYLPREDNISOLONE SODIUM SUCCINATE 125 MG: 125 INJECTION, POWDER, FOR SOLUTION INTRAMUSCULAR; INTRAVENOUS at 13:34

## 2024-01-29 RX ADMIN — AMIODARONE HYDROCHLORIDE 200 MG: 200 TABLET ORAL at 08:44

## 2024-01-29 RX ADMIN — PROPOFOL 15 MCG/KG/MIN: 10 INJECTION, EMULSION INTRAVENOUS at 06:17

## 2024-01-29 RX ADMIN — Medication 150 MCG/HR: at 01:11

## 2024-01-29 RX ADMIN — GUAIFENESIN 200 MG: 200 SOLUTION ORAL at 23:39

## 2024-01-29 RX ADMIN — METOPROLOL TARTRATE 12.5 MG: 25 TABLET, FILM COATED ORAL at 08:44

## 2024-01-29 RX ADMIN — GUAIFENESIN 200 MG: 200 SOLUTION ORAL at 16:15

## 2024-01-29 RX ADMIN — PAROXETINE HYDROCHLORIDE 20 MG: 20 TABLET, FILM COATED ORAL at 08:44

## 2024-01-29 RX ADMIN — IPRATROPIUM BROMIDE 0.5 MG: 0.5 SOLUTION RESPIRATORY (INHALATION) at 19:09

## 2024-01-29 RX ADMIN — AZITHROMYCIN MONOHYDRATE 250 MG: 500 INJECTION, POWDER, LYOPHILIZED, FOR SOLUTION INTRAVENOUS at 16:15

## 2024-01-29 ASSESSMENT — ACTIVITIES OF DAILY LIVING (ADL)
ADLS_ACUITY_SCORE: 36
ADLS_ACUITY_SCORE: 40
ADLS_ACUITY_SCORE: 36

## 2024-01-29 NOTE — PROGRESS NOTES
[facility]    Medicine Progress Note - Hospitalist Service    Date of Admission:  1/22/2024    Assessment & Plan     Acute hypoxemic hypercarbic respiratory failure 2/2 severe COPD Exacerbation  Severe COPD  History of intubation with tracheostomy  Presented with COPD exacerbation; has had frequent exacerbations requiring admission, with this being her third admission in 3 weeks. Discharged 1/10/24  with prednisone taper, and she finished this several days before presentation. She presents again with COPD exacerbation, and had remained relatively well compensated until yesterday when she continued to experience air hunger and a feeling of anxiety/distress, despite seemingly adequate oxygenation and stable gases. She had received continuous nebs, nebs scheduled q2 h, steroids, azithromycin, and benzos without relief. She went into AF with RVR in the evening and was intubated for dyspnea, tachypnea.   -she denied improvement with nebs; suspect continuous use also contributed to increased sympathetic tone, with tachcyardia and anxiety possibly worsening anxiety as well as possibly driving rvr. Will trial atrovent QID   -continue vent management per intensivist  -continue sedation and analgesia  -given her description of subjective events and responses to therapies, would consider adding low dose opioid therapy for air hunger once extubated; bedside RN notes she seems to be very anxious and likley component of anticipatory anxiety. Possibly treating air hunger would help to suppress anxiety that worsens tachypnea, HR, leading to increased metabolic needs and shallow breathing  -would benefit from outpatient palliative support for symptomatic management  -pantoprazole     Afib with RVR  Afib with RVR overnight; pressures softer, holding dilt. She is on amiodarone PTA and given current admission will continue for now and discuss with pulmonary if this is a medication that should be continued given  "her severe lung disease; will continue given RVR and given long half life, prudent to continue for now.  -lopressor 12.5 bid with hold parameters     Diet: Combination Diet Regular Diet Adult  Adult Formula Drip Feeding: Continuous Vital High Protein; Orogastric tube; Goal Rate: 45; mL/hr; Inititate at 15ml/hr and if tolerated increase by 10ml every 12 hours    DVT Prophylaxis: DOAC  Hines Catheter: PRESENT, indication: Strict 1-2 Hour I&O  Lines: PRESENT      PICC 01/28/24 Triple Lumen Left Brachial vein lateral-Site Assessment: WDL    Cardiac Monitoring: ACTIVE order. Indication: Tachyarrhythmias, acute (48 hours)  Code Status: Full Code      Clinically Significant Risk Factors                  # Hypertension: Noted on problem list        # Overweight: Estimated body mass index is 25.93 kg/m  as calculated from the following:    Height as of this encounter: 1.6 m (5' 3\").    Weight as of this encounter: 66.4 kg (146 lb 6.2 oz).             Disposition Plan           Joan Haile DO  Hospitalist Service  [facility]  Securely message with [employer] (more info)  Text page via [facility] Paging/Directory   ______________________________________________________________________        Physical Exam   Vital Signs: Temp: (!) 100.6  F (38.1  C) Temp src: Oral BP: 111/73 Pulse: 117   Resp: 14 SpO2: 93 % O2 Device: Mechanical Ventilator    Weight: 146 lbs 6.17 oz    General: calm, sedated but rousable.  HEENT: ett tube  Cardiac: tachy, irregular  Respiratory: venilated  GI:  Abdomen soft, nontender, nondistended.  Neurologic: rouses to voice pain, sedated      Medical Decision Making       45 MINUTES SPENT BY ME on the date of service doing chart review, history, exam, documentation & further activities per the note.      Data     I have personally reviewed the following data over the past 24 hrs:    N/A  \   N/A   / N/A     139 102 44.1 (H) /  154 (H)   4.5 33 (H) 1.01 (H) \       Imaging results reviewed " over the past 24 hrs:   Recent Results (from the past 24 hour(s))   XR Chest Port 1 View    Narrative    CHEST ONE VIEW January 29, 2024 9:28 AM     HISTORY: Question of PICC line position.    COMPARISON: Chest radiograph 1/28/2024.      Impression    IMPRESSION:   Lines and tubes: Left PICC tip appears repositioned, now projecting  over the mid/lower SVC. Endotracheal tube tip 3.5 cm above the stevie.  Gastric drainage tube tip courses below the diaphragm.    Similar calcifications within the left lung apex. No new focal  consolidation, pleural effusion or pneumothorax. Unchanged  cardiomediastinal silhouette.    LOY DOE MD         SYSTEM ID:  [id number]

## 2024-01-29 NOTE — PROGRESS NOTES
ICU Attending Note    I have seen and examined Lara Melendez, reviewed the patient's history, pertinent labs, vital signs, medications, physical exam, and radiographs.  The patient is critically ill by my examination and requires continued ICU monitoring and cares    Lara Melendez is admitted to ICU with a COPD exacerbation.  Has had multiple recent admissions for the same.  Recently completed a steroid taper.    Recent Events:  Sedated and appears comfortable on the ventilator.    Exam:  Temp:  [97.3  F (36.3  C)-99.9  F (37.7  C)] 99.9  F (37.7  C)  Pulse:  [] 79  Resp:  [7-25] 15  BP: ()/(55-92) 89/58  FiO2 (%):  [35 %-40 %] 35 %  SpO2:  [93 %-100 %] 97 %  @I/O last 3 completed shifts:  In: 2076.06 [I.V.:1481.06; NG/GT:430]  Out: 1190 [Urine:1165; Stool:25]  Vent Mode: CMV/AC  (Continuous Mandatory Ventilation/ Assist Control)  FiO2 (%): 35 %  Resp Rate (Set): 15 breaths/min  Tidal Volume (Set, mL): 350 mL  PEEP (cm H2O): 5 cmH2O  Pressure Support (cm H2O): 10 cmH2O  Inspiratory Pressure (cm H2O) (Drager Norma): 20  Resp: 15    Lungs coarse with wheezes  Heart rrr, distant sounds  Abd ostomy bag, nontender  Ext warm, perfused      Results:  ABG   Recent Labs   Lab 01/28/24  0552   PH 7.35  7.35   PCO2 62*  62*   PO2 62*  58*   HCO3 34*  34*     CBC  Recent Labs   Lab 01/28/24  0405 01/26/24  1049 01/24/24  0746 01/22/24  1527   WBC 21.5* 6.6 11.5* 10.3   HGB 15.4 13.5 13.7 15.0   HCT 47.8* 43.2 41.0 44.6    190 204 201     BMP  Recent Labs   Lab 01/29/24  0422 01/28/24  1956 01/28/24  1559 01/28/24  1218 01/28/24  0800 01/28/24  0405 01/26/24  1815 01/26/24  1049 01/25/24  0750     --   --   --   --  141  --  140 138   POTASSIUM 4.5  --   --   --   --  3.9  --  4.3 3.8   CHLORIDE 102  --   --   --   --  98  --  101 101   CO2 33*  --   --   --   --  29  --  29 31*   BUN 44.1*  --   --   --   --  36.0*  --  19.2 17.5   CR 1.01*  --   --   --   --  0.79  --  0.75 0.98*   *  143* 146* 172*   < > 182*   < > 141* 94    < > = values in this interval not displayed.     LFT  Recent Labs   Lab 01/22/24  1527   AST 27   ALT 40   ALKPHOS 80   BILITOTAL 0.3   ALBUMIN 4.4     PancreasNo lab results found in last 7 days.  INR  Lab Results   Component Value Date    INR 1.04 06/21/2023    INR 0.97 03/26/2023    INR 0.91 10/26/2022    INR 1.04 09/27/2022       Current Issues:    Acute hypoxic respiratory failure: steroids, furosemide, opiates for air hunger.   Atrial fibrillation with RVR: beta blocker  COPD exacerbation azithro, steroids  Protein calorie malnutrition: based on exam, limited intake, and work of breathing for severe COPD.  Tube feeds    Evaluation and management time exclusive of procedures was 30 minutes critical care time including:  examination with the ICU team, discussion of the patient's condition with other physicians and members of the care team, reviewing all data related to the patient, and time utilizing the EMR for documentation of this patient's care.      Ross Sumner MD  Acute Care Surgery/Critical Care

## 2024-01-29 NOTE — PLAN OF CARE
Goal Outcome Evaluation:      Plan of Care Reviewed With: patient, parent, sibling    Overall Patient Progress: improvingOverall Patient Progress: improving  ICU End of Shift Summary.  For vital signs and complete assessments, please see documentation flowsheets.     Pertinent assessments: Remains on mechanical ventilation and maintains sats>95%. Suctioned for minimal thick secretions. Bronchodilators and steroids continued. Neurologically sedated at goal with propofol and fentanyl. Rhythm atrial, rate controlled on BB. Bp soft but MAP wnl. Tolerating TF. Colostomy with some stool and stoma viable. El with adequate uop. No skin issues noted. Family updated at bedside.     Major Shift Events: As outlined above  Plan (Upcoming Events): Advance TF to goal at 0600  Discharge/Transfer Needs: TBD    Bedside Shift Report Completed : Y   Bedside Safety Check Completed: Y    RESTRAINTS     Right wrist and Left wrist restraints continued 1/29/2024    Clinical Justification: Pulling lines, pulling tubes, and pulling equipment  Less Restrictive Alternative: Repositioning, Disguise equipment, De-escalation, Pain management  Attending Physician Notified: MD ordered restraint,     New orders placed Yes  Length of Order: 1 Day    CAUTI  Notified provider about indwelling el catheter discussed removal or continued need.    Did provider choose to remove indwelling el catheter? NO    Provider's el indication for keeping indwelling el catheter: Indication for continued use: Strict 1-2 Hour I & O if external catheters are not an option    Is there an order for indwelling el catheter? YES

## 2024-01-29 NOTE — PROGRESS NOTES
Cardiology Progress Note  Lavonne SHARMA, CNP     Follows with Dr Yeh       Assessment and Plan:     Admit (1/22/24) via EMS for wheezing, diaphoresis  Evidence of acute respiratory failure, COPD exacerbation, rapid A-fib, uncontrolled hypertension    PMH:  severe COPD on O2, persistent A-fib, hypertension, hyperlipidemia, diverticulitis, pulmonary nodule, anxiety    COPD exacerbation/ Respiratory Failure  -frequent exacerbations  -bronchospasm  -now mechanically ventilated     Paroxysmal A-Fib with RVR  -developed rapid A-fib (1/26/24), has been on amiodarone since (7/2023) for rhythm control  -Diltiazem drip changed to Metoprolol due to SBPs yesterday  - Would ask pulmonary to weigh in on continued Amiodarone use given her severe COPD  -ongoing A-fib with HRs 80-110s overnight  -continue rate control with Metoprolol for now and Amiodarone if okay with pulmonary  could consider future cardioversion as outpatient  -on Eliquis  -consider ECHO when more stable  (LVEF 70% with normal RV function 3/2023)    Hypertension:  -now controlled on ventilator               Interval History:     Intubated/sedated  Responds to commands                Medications:      amiodarone  200 mg Oral or Feeding Tube Daily    apixaban ANTICOAGULANT  5 mg Oral or Feeding Tube BID    atorvastatin  20 mg Oral or Feeding Tube Daily    azithromycin  250 mg Intravenous Q24H    clonazePAM  0.5 mg Oral or Feeding Tube Daily    guaiFENesin  200 mg Oral or Feeding Tube Q4H    ipratropium  0.5 mg Nebulization 4x daily    levothyroxine  112 mcg Oral or Feeding Tube Daily    [Held by provider] losartan  25 mg Oral Daily    methylPREDNISolone  125 mg Intravenous Q12H    metoprolol tartrate  12.5 mg Oral or Feeding Tube BID    pantoprazole  40 mg Per Feeding Tube QAM AC    Or    pantoprazole  40 mg Intravenous QAM AC    PARoxetine  20 mg Oral or Feeding Tube Daily    sodium chloride (PF)  3 mL Intracatheter Q8H            Physical Exam:   Blood  "pressure (!) 84/57, pulse 98, temperature 99.5  F (37.5  C), resp. rate 14, height 1.6 m (5' 3\"), weight 66.4 kg (146 lb 6.2 oz), SpO2 98%.  Wt Readings from Last 3 Encounters:   01/29/24 66.4 kg (146 lb 6.2 oz)   01/09/24 66 kg (145 lb 8.1 oz)   12/28/23 65 kg (143 lb 4.8 oz)     I/O last 3 completed shifts:  In: 2076.06 [I.V.:1481.06; NG/GT:430]  Out: 1190 [Urine:1165; Stool:25]    CONST:  sedated, intubated  LUNGS:  CTA bilaterally   CARDIO:  Irreg Irreg  ABD:  obese  EXT:  trace ankle edema           Data:   TELE:  A-fib  HRs 80 bpm    CBC  Recent Labs   Lab 01/28/24  0405 01/26/24  1049   WBC 21.5* 6.6   HGB 15.4 13.5    190       BMP  Recent Labs   Lab 01/29/24  0422 01/28/24  1956 01/28/24  1559 01/28/24  1218 01/28/24  0800 01/28/24  0405 01/26/24  1815 01/26/24  1049 01/25/24  0750     --   --   --   --  141  --  140 138   POTASSIUM 4.5  --   --   --   --  3.9  --  4.3 3.8   CHLORIDE 102  --   --   --   --  98  --  101 101   EDIN 8.7*  --   --   --   --  9.3  --  9.2 8.7*   CO2 33*  --   --   --   --  29  --  29 31*   BUN 44.1*  --   --   --   --  36.0*  --  19.2 17.5   CR 1.01*  --   --   --   --  0.79  --  0.75 0.98*   * 143* 146* 172*   < > 182*   < > 141* 94    < > = values in this interval not displayed.     Recent Labs   Lab Test 07/08/23  0416 06/30/23  0505   TRIG 111 97       TROP  No results found for: \"TROPI\", \"TROPONIN\", \"TROPR\"    BNP  Recent Labs   Lab 01/26/24  1049   NTBNPI 245            "

## 2024-01-29 NOTE — PROGRESS NOTES
[facility] ICU VENTILATOR RESPIRATORY NOTE     Date of Admission: 1/22/24     Date of Intubation (most recent): 1/28/24     Reason for Mechanical Ventilation: respiratory failure     Number of Days on Mechanical Ventilation: 7        Vent Mode: CMV/AC  (Continuous Mandatory Ventilation/ Assist Control)  FiO2 (%): 35 %  Resp Rate (Set): 15 breaths/min  Tidal Volume (Set, mL): 350 mL  PEEP (cm H2O): 5 cmH2O  Pressure Support (cm H2O): 10 cmH2O  Inspiratory Pressure (cm H2O) (Drager Norma): 20  Resp: 14     Patient received Atrovent neb QID. 7.0 ETT secure 25 @ teeth/gum    ETT appearance on chest x-ray:  . Stable position of endotracheal tube.   Plan: Continue to monitor.

## 2024-01-29 NOTE — PROGRESS NOTES
[facility] ICU VENTILATOR RESPIRATORY NOTE  Date of Admission: 1/22/24  Date of Intubation (most recent): 1/28/24  Reason for Mechanical Ventilation: Respiratory failure  Number of Days on Mechanical Ventilation: 2  Vent Mode: CMV/AC  (Continuous Mandatory Ventilation/ Assist Control)  FiO2 (%): 35 %  Resp Rate (Set): 15 breaths/min  Tidal Volume (Set, mL): 350 mL  PEEP (cm H2O): 5 cmH2O  Pressure Support (cm H2O): 10 cmH2O  Inspiratory Pressure (cm H2O) (Drager Norma): 20  Resp: 14     Significant Events Today: Continues to have air trapping, lowering RR for a short period of times does seem to help.  ABG Results:   Recent Labs   Lab 01/28/24  0552 01/28/24  0405 01/27/24  0620 01/26/24 2056   PH 7.35  7.35  --   --   --    PCO2 62*  62*  --   --   --    PO2 62*  58*  --   --   --    HCO3 34*  34*  --   --   --    O2PER 30  30 35 30 30      ETT appearance on chest x-ray:   Narrative & Impression   EXAM: XR CHEST PORT 1 VIEW  LOCATION: [facility]  DATE: 1/28/2024     INDICATION: RN placed PICC   verify tip placement  COMPARISON: Chest radiograph 01/28/2024                                                                      IMPRESSION: Stable size of cardiomediastinal silhouette. Placement of left upper extremity PICC, tip appears doubled back upon itself overlying the superior vena cava although azygous positioning is also possible, consider slight retraction   (approximately 2 cm). Stable position of endotracheal tube. Nasogastric tube courses below the diaphragm with sidehole overlying the stomach, tip beyond the field-of-view. No new airspace disease, pleural effusion or pneumothorax. Bones are unchanged.       Plan:  Continue to monitor    RT Dre on 1/29/2024 at 5:40 AM

## 2024-01-29 NOTE — PLAN OF CARE
ICU End of Shift Summary.  For vital signs and complete assessments, please see documentation flowsheets.      Pertinent assessments: Pt intubated and sedated. Arouses to speech/touch. Tele Afib with rates . FiO2 decreased to 35. Afebrile. Denied pain. BPs soft but MAPs remained above 65. Hines in place with adequate UOP. BS+, colostomy in place with small output. TF advanced 25 at 0700 with FWF.     Plan (Upcoming Events): tbd  Discharge/Transfer Needs: tbd     Bedside Shift Report Completed : yes  Bedside Safety Check Completed: yes    Right wrist and Left wrist restraints continued 1/29/2024    Clinical Justification: Pulling lines, pulling tubes, and pulling equipment  Less Restrictive Alternative: Repositioning, Re-evaluate equipment, Disguise equipment, Pain management, Reorientation, De-escalation  Attending Physician Notified: MD ordered restraint,     New orders placed Yes  Length of Order: 1 Day      Santa De La Torre RN

## 2024-01-29 NOTE — PROGRESS NOTES
CLINICAL NUTRITION SERVICES - BRIEF NOTE      CURRENT NUTRITION SUPPORT  Access: OGT   Formula/Rate/Provisions: Vital High Protein @ goal of  45ml/hr  (1080ml/day)  will provide: 1080 kcals, 94 g PRO, 902 ml free H20, 119 g CHO, and 0 g fiber daily.  Flushes: 60 ml q 4 hrs    Propofol at current rate provides 319 kcal/day    Total energy/protein provisions: 1399 kcal (21 kcal/kg)/94 g protein (1.4 g/kg) per DW 66.4 kg     NEW FINDINGS   Patient discussed during IDT rounds this morning. EN held by provider this morning due to concern for no stoma output, but this has picked up so will be restarted.    Labs:  - BUN 44.1  - Cr 1.01    GI: TF held this morning due to no output from stoma and mild colonic distension on imaging; now having moderate output so TF resumed      ASSESSED NUTRITION NEEDS   Dosing Weight: 66.4 kg, lowest documented wt this admission   Estimated Energy Needs: 4824-5350 kcals/day (20 - 25 kcals/kg)   Justification: Vented   Estimated Protein Needs:  grams protein/day (1.2 - 2 grams of pro/kg)   Justification: Hypercatabolism with critical illness   Estimated Fluid Needs: Per provider pending fluid status     INTERVENTIONS  Implementation  Add 1 pkt Prosource TF 20/day = 80 kcal/20 g protein    Total energy/protein intake, all sources = 1479 kcal (22 kcal/kg)/114 g protein (1.7 g/kg) per DW 66.4 kg    At current rate, EN formula does not meet 100% of DRIs for micronutrients-- add Certavite daily    Monitoring/Evaluation  Will continue to monitor and evaluate per protocol.    Danielle Molina, RASHAD, LD, [facility]  Pager - 3rd floor/ICU: [phone]  Pager - All other floors: [phone]  Pager - Weekend/holiday: [phone]  Office: [phone]

## 2024-01-30 LAB
GLUCOSE BLDC GLUCOMTR-MCNC: 123 MG/DL (ref 70–99)
GLUCOSE BLDC GLUCOMTR-MCNC: 138 MG/DL (ref 70–99)
GLUCOSE BLDC GLUCOMTR-MCNC: 149 MG/DL (ref 70–99)
GLUCOSE BLDC GLUCOMTR-MCNC: 161 MG/DL (ref 70–99)
GLUCOSE BLDC GLUCOMTR-MCNC: 166 MG/DL (ref 70–99)
GLUCOSE BLDC GLUCOMTR-MCNC: 98 MG/DL (ref 70–99)
MAGNESIUM SERPL-MCNC: 2.5 MG/DL (ref 1.7–2.3)
PROCALCITONIN SERPL IA-MCNC: 0.11 NG/ML

## 2024-01-30 PROCEDURE — 250N000013 HC RX MED GY IP 250 OP 250 PS 637: Performed by: INTERNAL MEDICINE

## 2024-01-30 PROCEDURE — 83735 ASSAY OF MAGNESIUM: CPT | Performed by: HOSPITALIST

## 2024-01-30 PROCEDURE — 258N000003 HC RX IP 258 OP 636: Performed by: STUDENT IN AN ORGANIZED HEALTH CARE EDUCATION/TRAINING PROGRAM

## 2024-01-30 PROCEDURE — 250N000011 HC RX IP 250 OP 636: Performed by: STUDENT IN AN ORGANIZED HEALTH CARE EDUCATION/TRAINING PROGRAM

## 2024-01-30 PROCEDURE — 250N000011 HC RX IP 250 OP 636: Performed by: INTERNAL MEDICINE

## 2024-01-30 PROCEDURE — 250N000011 HC RX IP 250 OP 636

## 2024-01-30 PROCEDURE — 250N000013 HC RX MED GY IP 250 OP 250 PS 637: Performed by: ANESTHESIOLOGY

## 2024-01-30 PROCEDURE — 99291 CRITICAL CARE FIRST HOUR: CPT | Performed by: SURGERY

## 2024-01-30 PROCEDURE — 99232 SBSQ HOSP IP/OBS MODERATE 35: CPT | Performed by: STUDENT IN AN ORGANIZED HEALTH CARE EDUCATION/TRAINING PROGRAM

## 2024-01-30 PROCEDURE — 200N000001 HC R&B ICU

## 2024-01-30 PROCEDURE — C9113 INJ PANTOPRAZOLE SODIUM, VIA: HCPCS | Performed by: INTERNAL MEDICINE

## 2024-01-30 PROCEDURE — 84145 PROCALCITONIN (PCT): CPT | Performed by: INTERNAL MEDICINE

## 2024-01-30 PROCEDURE — 250N000009 HC RX 250: Performed by: ANESTHESIOLOGY

## 2024-01-30 PROCEDURE — 94003 VENT MGMT INPAT SUBQ DAY: CPT

## 2024-01-30 PROCEDURE — 94640 AIRWAY INHALATION TREATMENT: CPT

## 2024-01-30 PROCEDURE — 250N000011 HC RX IP 250 OP 636: Performed by: ANESTHESIOLOGY

## 2024-01-30 PROCEDURE — 250N000013 HC RX MED GY IP 250 OP 250 PS 637: Performed by: STUDENT IN AN ORGANIZED HEALTH CARE EDUCATION/TRAINING PROGRAM

## 2024-01-30 PROCEDURE — 94640 AIRWAY INHALATION TREATMENT: CPT | Mod: 76

## 2024-01-30 PROCEDURE — 999N000157 HC STATISTIC RCP TIME EA 10 MIN

## 2024-01-30 RX ORDER — METHYLPREDNISOLONE SODIUM SUCCINATE 125 MG/2ML
INJECTION, POWDER, LYOPHILIZED, FOR SOLUTION INTRAMUSCULAR; INTRAVENOUS
Status: COMPLETED
Start: 2024-01-30 | End: 2024-01-30

## 2024-01-30 RX ORDER — METHYLPREDNISOLONE SODIUM SUCCINATE 125 MG/2ML
125 INJECTION, POWDER, LYOPHILIZED, FOR SOLUTION INTRAMUSCULAR; INTRAVENOUS EVERY 12 HOURS
Status: DISCONTINUED | OUTPATIENT
Start: 2024-01-30 | End: 2024-02-02

## 2024-01-30 RX ADMIN — METOPROLOL TARTRATE 12.5 MG: 25 TABLET, FILM COATED ORAL at 08:24

## 2024-01-30 RX ADMIN — GUAIFENESIN 200 MG: 200 SOLUTION ORAL at 23:30

## 2024-01-30 RX ADMIN — METHYLPREDNISOLONE SODIUM SUCCINATE 125 MG: 125 INJECTION, POWDER, FOR SOLUTION INTRAMUSCULAR; INTRAVENOUS at 19:04

## 2024-01-30 RX ADMIN — GUAIFENESIN 200 MG: 200 SOLUTION ORAL at 08:23

## 2024-01-30 RX ADMIN — IPRATROPIUM BROMIDE 0.5 MG: 0.5 SOLUTION RESPIRATORY (INHALATION) at 15:33

## 2024-01-30 RX ADMIN — METHYLPREDNISOLONE SODIUM SUCCINATE 125 MG: 125 INJECTION, POWDER, FOR SOLUTION INTRAMUSCULAR; INTRAVENOUS at 19:03

## 2024-01-30 RX ADMIN — IPRATROPIUM BROMIDE 0.5 MG: 0.5 SOLUTION RESPIRATORY (INHALATION) at 08:04

## 2024-01-30 RX ADMIN — PROPOFOL 30 MCG/KG/MIN: 10 INJECTION, EMULSION INTRAVENOUS at 17:47

## 2024-01-30 RX ADMIN — APIXABAN 5 MG: 5 TABLET, FILM COATED ORAL at 08:24

## 2024-01-30 RX ADMIN — APIXABAN 5 MG: 5 TABLET, FILM COATED ORAL at 20:09

## 2024-01-30 RX ADMIN — CLONAZEPAM 0.5 MG: 0.5 TABLET ORAL at 08:24

## 2024-01-30 RX ADMIN — Medication 1 PACKET: at 08:25

## 2024-01-30 RX ADMIN — PROPOFOL 30 MCG/KG/MIN: 10 INJECTION, EMULSION INTRAVENOUS at 03:17

## 2024-01-30 RX ADMIN — GUAIFENESIN 200 MG: 200 SOLUTION ORAL at 03:50

## 2024-01-30 RX ADMIN — LEVOTHYROXINE SODIUM 112 MCG: 0.11 TABLET ORAL at 08:24

## 2024-01-30 RX ADMIN — IPRATROPIUM BROMIDE 0.5 MG: 0.5 SOLUTION RESPIRATORY (INHALATION) at 11:21

## 2024-01-30 RX ADMIN — PANTOPRAZOLE SODIUM 40 MG: 40 INJECTION, POWDER, FOR SOLUTION INTRAVENOUS at 08:24

## 2024-01-30 RX ADMIN — SODIUM CHLORIDE, POTASSIUM CHLORIDE, SODIUM LACTATE AND CALCIUM CHLORIDE: 600; 310; 30; 20 INJECTION, SOLUTION INTRAVENOUS at 01:02

## 2024-01-30 RX ADMIN — IPRATROPIUM BROMIDE 0.5 MG: 0.5 SOLUTION RESPIRATORY (INHALATION) at 19:28

## 2024-01-30 RX ADMIN — PROPOFOL 30 MCG/KG/MIN: 10 INJECTION, EMULSION INTRAVENOUS at 10:20

## 2024-01-30 RX ADMIN — GUAIFENESIN 200 MG: 200 SOLUTION ORAL at 16:03

## 2024-01-30 RX ADMIN — ATORVASTATIN CALCIUM 20 MG: 20 TABLET, FILM COATED ORAL at 08:24

## 2024-01-30 RX ADMIN — AMIODARONE HYDROCHLORIDE 200 MG: 200 TABLET ORAL at 08:23

## 2024-01-30 RX ADMIN — GUAIFENESIN 200 MG: 200 SOLUTION ORAL at 20:09

## 2024-01-30 RX ADMIN — PAROXETINE HYDROCHLORIDE 20 MG: 20 TABLET, FILM COATED ORAL at 08:24

## 2024-01-30 RX ADMIN — GUAIFENESIN 200 MG: 200 SOLUTION ORAL at 11:36

## 2024-01-30 RX ADMIN — Medication 15 ML: at 08:23

## 2024-01-30 RX ADMIN — Medication 75 MCG/HR: at 10:02

## 2024-01-30 ASSESSMENT — ACTIVITIES OF DAILY LIVING (ADL)
ADLS_ACUITY_SCORE: 36

## 2024-01-30 NOTE — PLAN OF CARE
Problem: Enteral Nutrition  Goal: Absence of Aspiration Signs and Symptoms  Outcome: Progressing  Goal: Safe, Effective Therapy Delivery  Outcome: Progressing   Goal Outcome Evaluation: patient tolerating TF at current goal rate,  indicates no concerns PTA in terms of nutrition or weight, RD continues to follow closely.

## 2024-01-30 NOTE — PROGRESS NOTES
ICU Attending Note    I have seen and examined Lara Melendez, reviewed the patient's history, pertinent labs, vital signs, medications, physical exam, and radiographs.  The patient is critically ill by my examination and requires continued ICU monitoring and cares    Lara Melendez is admitted to ICU with a COPD exacerbation.  Has had multiple recent admissions for the same.  Recently completed a steroid taper.    Recent Events:  No new issues.  Became hypoxic with vent wean this am    Exam:  Temp:  [97.7  F (36.5  C)-100.6  F (38.1  C)] 98.4  F (36.9  C)  Pulse:  [] 75  Resp:  [9-20] 14  BP: ()/(56-92) 93/60  FiO2 (%):  [30 %-35 %] 30 %  SpO2:  [89 %-100 %] 98 %  @I/O last 3 completed shifts:  In: 3979.51 [I.V.:2724.51; NG/GT:540]  Out: 1765 [Urine:1720; Stool:45]  Vent Mode: CMV/AC  (Continuous Mandatory Ventilation/ Assist Control)  FiO2 (%): 30 %  Resp Rate (Set): 15 breaths/min  Tidal Volume (Set, mL): 350 mL  PEEP (cm H2O): 5 cmH2O  Resp: 14    Lungs clear  Heart rrr, distant sounds  Abd ostomy bag, nontender  Ext warm, perfused    Results:  ABG   Recent Labs   Lab 01/28/24  0552   PH 7.35  7.35   PCO2 62*  62*   PO2 62*  58*   HCO3 34*  34*     CBC  Recent Labs   Lab 01/28/24  0405 01/26/24  1049 01/24/24  0746   WBC 21.5* 6.6 11.5*   HGB 15.4 13.5 13.7   HCT 47.8* 43.2 41.0    190 204     BMP  Recent Labs   Lab 01/30/24  0804 01/30/24  0351 01/29/24  2352 01/29/24 2003 01/29/24  0817 01/29/24  0422 01/28/24  0800 01/28/24  0405 01/26/24  1815 01/26/24  1049 01/25/24  0750   NA  --   --   --   --   --  139  --  141  --  140 138   POTASSIUM  --   --   --   --   --  4.5  --  3.9  --  4.3 3.8   CHLORIDE  --   --   --   --   --  102  --  98  --  101 101   CO2  --   --   --   --   --  33*  --  29  --  29 31*   BUN  --   --   --   --   --  44.1*  --  36.0*  --  19.2 17.5   CR  --   --   --   --   --  1.01*  --  0.79  --  0.75 0.98*   * 166* 151* 134*   < > 150*   < > 182*   < >  141* 94    < > = values in this interval not displayed.     LFT  No lab results found in last 7 days.    PancreasNo lab results found in last 7 days.  INR  Lab Results   Component Value Date    INR 1.04 06/21/2023    INR 0.97 03/26/2023    INR 0.91 10/26/2022    INR 1.04 09/27/2022       Current Issues:    Acute hypoxic respiratory failure: continue steroids, furosemide, opiates for air hunger.  Ongoing vent wean.  May need a tracheostomy.  Atrial fibrillation with RVR: beta blocker  COPD exacerbation azithro, steroids  Protein calorie malnutrition: based on exam, limited intake, and work of breathing for severe COPD.  Tube feeds.       Evaluation and management time exclusive of procedures was 30 minutes critical care time including:  examination with the ICU team, discussion of the patient's condition with other physicians and members of the care team, reviewing all data related to the patient, and time utilizing the EMR for documentation of this patient's care.      Ross Sumner MD  Acute Care Surgery/Critical Care

## 2024-01-30 NOTE — PROGRESS NOTES
[facility] ICU VENTILATOR RESPIRATORY NOTE     Date of Admission: 1/22/24  Date of Intubation (most recent): 1/28/24  Reason for Mechanical Ventilation: respiratory failure  Number of Days on Mechanical Ventilation: 7    Vent Mode: CMV/AC  (Continuous Mandatory Ventilation/ Assist Control)  FiO2 (%): 30 %  Resp Rate (Set): 15 breaths/min  Tidal Volume (Set, mL): 350 mL  PEEP (cm H2O): 5 cmH2O  Resp: 15     Patient received Atrovent neb QID. 7.0 ETT secure 25 @ teeth/gum     ETT appearance on chest x-ray:  . Stable position of endotracheal tube.   Plan: Continue to monitor.   Meghan Lanier, RT on 1/30/2024 at 4:51 PM

## 2024-01-30 NOTE — PROGRESS NOTES
"[facility] ICU VENTILATOR RESPIRATORY NOTE    Date of Admission: 1/22/2024  Date of Intubation (most recent): 1/28  Reason for Mechanical Ventilation: Severe COPD exacerbation  Number of Days on Mechanical Ventilation: 3    ETT appearance on chest x-ray: 3.5cm above stevie - in good position    PPlat: 19-20  PEEPi: 6.5-8  BS: diminished  Secretions: small, cloudy/white, thick      Plan:  Assess ability for SBT?    Vent Mode: CMV/AC  (Continuous Mandatory Ventilation/ Assist Control)  FiO2 (%): 30 %  Resp Rate (Set): 15 breaths/min  Tidal Volume (Set, mL): 350 mL  PEEP (cm H2O): 5 cmH2O  Resp: 15    BP 92/61   Pulse 73   Temp 98.4  F (36.9  C)   Resp 15   Ht 1.6 m (5' 3\")   Wt 66.8 kg (147 lb 4.3 oz)   SpO2 98%   BMI 26.09 kg/m      Shanika Frank, RT      "

## 2024-01-30 NOTE — PROGRESS NOTES
CLINICAL NUTRITION SERVICES  -  BRIEF NOTE     Nutrition Prescription      Malnutrition Status:    % Intake: Decreased intake does not meet criteria  % Weight Loss: None noted  Subcutaneous Fat Loss: None observed  Muscle Loss: None observed  Fluid Accumulation/Edema: None noted  Malnutrition Diagnosis: Patient does not meet two of the established criteria necessary for diagnosing malnutrition       CURRENT NUTRITION SUPPORT:  Access: OGT  Formula/Rate/Provisions: Vital High Protein @ goal of  45ml/hr  (1080ml/day)  will provide: 1080 kcals, 94 g PRO, 902 ml free H20, 119 g CHO, and 0 g fiber daily.  Flushes: 60 ml q 4 hrs    Modulars: Prosource TF 20 x 1 pkt/day = 80 kcal/20 g protein    Propofol at current rate provides 319 kcal/day    Total energy/protein provisions: 1479 kcal (22 kcal/kg)/114 g protein (1.7 g/kg) per DW 66.4 kg    NUTRITION HISTORY  - history obtained from patient's . He reports Ewa has been eating well and he has no concerns about unintentional weight loss or changes in appetite.   - NKFA    PHYSICAL FINDINGS  See malnutrition section above    MALNUTRITION  As noted above    INTERVENTIONS  Implementation  Enteral Nutrition - continue as ordered, currently meeting needs     Monitoring/Evaluation  Progress toward goals will be monitored and evaluated per protocol.    Danielle Molina, RASHAD, LD, [facility]  Pager - 3rd floor/ICU: [phone]  Pager - All other floors: [phone]  Pager - Weekend/holiday: [phone]  Office: [phone]

## 2024-01-30 NOTE — PROGRESS NOTES
[facility] Physicians    Pulmonary, Allergy, Critical Care and Sleep Medicine    Pulmonary Consult Progress Note    Lara Melendez MRN# [id number]   Age: 58 year old YOB: 1965     Date of Admission: 1/22/2024  Date of Service: 01/29/2024     ==================================================  INTERVAL EVENTS:  -Intubated the morning of 1/28  -Fever curve and white count trending up, possibly indicating new infection    CHANGES FOR TODAY:  -Recheck procalcitonin      ==================================================    ASSESSMENT AND RECOMMENDATIONS:    ##Severe acute exacerbation of COPD  ## History of intubation and trach  ## Acute hypoxic and hypercarbic respiratory failure  No obvious trigger for recurrent admissions, though in the past they have seems to respond to prednisone with return of symptoms when prednisone complete.  baseline medication includes Breo and as needed DuoNebs, no maintenance anticholinergic.  Not on any maintenance medications to reduce exacerbations.  History of intubation with tracheostomy in 2023 so tracheal stenosis is a consideration, though no stridor on exam.    -Continue methylprednisolone 125 mg twice daily  -Agree with consulting palliative care  -Begin LAMA inhaler at discharge such as Spiriva  -Consider adding chronic azithromycin or Roflumilast at discharge to reduce exacerbations  -Follow-up with pulmonology after discharge  -Consider steroid nebs rather than inhaler at discharge to increase pulmonary steroid delivery  -Consider evaluation for inflammatory/immune trigger (eosinophils, IgE) as an outpatient when off prednisone      ## Paroxysmal atrial fibrillation with RVR  Amiodarone is being considered by cardiology.  While this is generally safe, given this patient's frequent respiratory troubles, I believe this patient has a relative contraindication to amiodarone and would recommend alternative medications if possible.        Kain  "Eleazar ALBA  Pulmonary & Critical Care  Pager: Click Here to page      I spent 35 minutes dedicated to this care so far today excluding procedures, including review of medical records, review of imaging (results & images), time with patient and time in documentation.    Pulmonary will continue to follow. We are in house at [facility] on Monday, Wednesday, and Friday. For assistance on other days, please page the on-call pulmonologist through [facility] or the .    ==================================================      PHYSICAL EXAM  /79   Pulse 109   Temp (!) 100.6  F (38.1  C) (Bladder)   Resp 15   Ht 1.6 m (5' 3\")   Wt 66.4 kg (146 lb 6.2 oz)   SpO2 95%   BMI 25.93 kg/m        Intake/Output Summary (Last 24 hours) at 1/29/2024 1909  Last data filed at 1/29/2024 1900  Gross per 24 hour   Intake 3827.59 ml   Output 1615 ml   Net 2212.59 ml       Vitals:    01/22/24 1833 01/27/24 0600 01/29/24 0430   Weight: 67 kg (147 lb 9.6 oz) 67 kg (147 lb 11.3 oz) 66.4 kg (146 lb 6.2 oz)             General: Sedated, intubated  Resp: Faint wheezes and prolonged expiratory phase  Cardiac: RRR, NS1,S2, No m/r/g  Abdomen: Soft, normal active bowel sounds  Extremities: No LE edema or obvious joint abnormalities  Skin: Warm, clammy        Recent Labs   Lab Test 01/28/24  0405 01/26/24  1049 01/24/24  0746   WBC 21.5* 6.6 11.5*   RBC 4.79 4.29 4.15   HGB 15.4 13.5 13.7    190 204       Recent Labs   Lab Test 01/29/24  1615 01/29/24  1222 01/29/24  0817 01/29/24  0422 01/28/24  0800 01/28/24  0405 01/27/24  0815 01/27/24  0620 01/26/24  1815 01/26/24  1049   NA  --   --   --  139  --  141  --   --   --  140   POTASSIUM  --   --   --  4.5  --  3.9  --   --   --  4.3   CHLORIDE  --   --   --  102  --  98  --   --   --  101   CO2  --   --   --  33*  --  29  --   --   --  29   BUN  --   --   --  44.1*  --  36.0*  --   --   --  19.2   CR  --   --   --  1.01*  --  0.79  --   --   --  0.75   * 138* 132* 150*   " "< > 182*   < >  --    < > 141*   EDIN  --   --   --  8.7*  --  9.3  --   --   --  9.2   MAG  --   --   --  2.7*  --  2.3  --  2.5*  --   --     < > = values in this interval not displayed.           No results for input(s): \"CULT\" in the last 168 hours.      Recent Results (from the past 48 hour(s))   XR Chest Port 1 View    Narrative    EXAM: XR CHEST PORT 1 VIEW  LOCATION: [facility]  DATE: 1/28/2024    INDICATION: SOB  COMPARISON: 01/09/2024      Impression    IMPRESSION: No change. Heart size and pulmonary vessels are normal. Lungs appear mildly hyperinflated. Grouping of tiny calcifications medial left apex unchanged. No new pneumonia.   XR Chest Port 1 View    Narrative    EXAM: XR CHEST PORT 1 VIEW  LOCATION: [facility]  DATE: 1/28/2024    INDICATION: Endotracheal tube positioning  COMPARISON: 01/28/2024      Impression    IMPRESSION: Stable cardiomediastinal silhouette. Endotracheal tube 5 cm above stevie. Interstitial prominence suggesting mild edema. Small coarse calcifications left apex unchanged. 9 mm nodular density right upper lobe level of anterior  2. Uncertain if this is confluence. Pneumonitis, nodule or granuloma not excluded. Short-term follow-up recommended. Atherosclerotic aorta.   XR Chest Port 1 View    Narrative    EXAM: XR CHEST PORT 1 VIEW  LOCATION: [facility]  DATE: 1/28/2024    INDICATION: RN placed PICC   verify tip placement  COMPARISON: Chest radiograph 01/28/2024      Impression    IMPRESSION: Stable size of cardiomediastinal silhouette. Placement of left upper extremity PICC, tip appears doubled back upon itself overlying the superior vena cava although azygous positioning is also possible, consider slight retraction   (approximately 2 cm). Stable position of endotracheal tube. Nasogastric tube courses below the diaphragm with sidehole overlying the stomach, tip beyond the field-of-view. No new airspace disease, " pleural effusion or pneumothorax. Bones are unchanged.   XR Abdomen Port 1 View    Narrative    EXAM: XR ABDOMEN PORT 1 VIEW  LOCATION: [facility]  DATE/TIME: 1/28/2024 11:45 AM CST    INDICATION: Enteric tube placement  COMPARISON: 07/20/2023, CT abdomen and pelvis from 07/11/2023      Impression    IMPRESSION:   Enteric tube terminates at the level of the gastric body. The last sidehole port terminates at the level of the gastroesophageal junction. The visualized small bowel is dilated up to 3.9 cm. In the right upper quadrant there is a hyperdense ovoid   masslike density which could represent hyperdense contents in the mildly distended colon at the hepatic flexure but this is incompletely evaluated. CT of the abdomen and pelvis is recommended for further evaluation. Cholecystectomy. No free air.    XR Chest Port 1 View    Narrative    CHEST ONE VIEW January 29, 2024 9:28 AM     HISTORY: Question of PICC line position.    COMPARISON: Chest radiograph 1/28/2024.      Impression    IMPRESSION:   Lines and tubes: Left PICC tip appears repositioned, now projecting  over the mid/lower SVC. Endotracheal tube tip 3.5 cm above the stevie.  Gastric drainage tube tip courses below the diaphragm.    Similar calcifications within the left lung apex. No new focal  consolidation, pleural effusion or pneumothorax. Unchanged  cardiomediastinal silhouette.    LOY DOE MD         SYSTEM ID:  [id number]

## 2024-01-30 NOTE — PLAN OF CARE
ICU End of Shift Summary.  For vital signs and complete assessments, please see documentation flowsheets.      Pertinent assessments: Tele AFIB. BP wnl. Lungs wheezy and diminished. Continues on vent support. Minimal secretions. Tolerating tube feed. Hines with adequate urine output.  Major Shift Events: none  Plan (Upcoming Events): Continue current plan of care  Discharge/Transfer Needs: Continue current plan of care     Bedside Shift Report Completed : Y  Bedside Safety Check Completed: Y

## 2024-01-31 LAB
ANION GAP SERPL CALCULATED.3IONS-SCNC: 3 MMOL/L (ref 7–15)
BASE EXCESS BLDV CALC-SCNC: 8.2 MMOL/L (ref -3–3)
BUN SERPL-MCNC: 32.6 MG/DL (ref 8–23)
CALCIUM SERPL-MCNC: 8.3 MG/DL (ref 8.8–10.2)
CHLORIDE SERPL-SCNC: 107 MMOL/L (ref 98–107)
CREAT SERPL-MCNC: 0.6 MG/DL (ref 0.51–0.95)
DEPRECATED HCO3 PLAS-SCNC: 33 MMOL/L (ref 22–29)
EGFRCR SERPLBLD CKD-EPI 2021: >90 ML/MIN/1.73M2
ERYTHROCYTE [DISTWIDTH] IN BLOOD BY AUTOMATED COUNT: 13.6 % (ref 10–15)
GLUCOSE BLDC GLUCOMTR-MCNC: 128 MG/DL (ref 70–99)
GLUCOSE BLDC GLUCOMTR-MCNC: 161 MG/DL (ref 70–99)
GLUCOSE BLDC GLUCOMTR-MCNC: 172 MG/DL (ref 70–99)
GLUCOSE BLDC GLUCOMTR-MCNC: 173 MG/DL (ref 70–99)
GLUCOSE BLDC GLUCOMTR-MCNC: 185 MG/DL (ref 70–99)
GLUCOSE SERPL-MCNC: 172 MG/DL (ref 70–99)
HCO3 BLDV-SCNC: 36 MMOL/L (ref 21–28)
HCT VFR BLD AUTO: 39.2 % (ref 35–47)
HGB BLD-MCNC: 12.3 G/DL (ref 11.7–15.7)
MAGNESIUM SERPL-MCNC: 2.2 MG/DL (ref 1.7–2.3)
MCH RBC QN AUTO: 32.3 PG (ref 26.5–33)
MCHC RBC AUTO-ENTMCNC: 31.4 G/DL (ref 31.5–36.5)
MCV RBC AUTO: 103 FL (ref 78–100)
O2/TOTAL GAS SETTING VFR VENT: 30 %
OXYHGB MFR BLDV: 69 % (ref 70–75)
PCO2 BLDV: 65 MM HG (ref 40–50)
PH BLDV: 7.35 [PH] (ref 7.32–7.43)
PLATELET # BLD AUTO: 166 10E3/UL (ref 150–450)
PO2 BLDV: 39 MM HG (ref 25–47)
POTASSIUM SERPL-SCNC: 4.9 MMOL/L (ref 3.4–5.3)
RBC # BLD AUTO: 3.81 10E6/UL (ref 3.8–5.2)
SAO2 % BLDV: 70.2 % (ref 70–75)
SODIUM SERPL-SCNC: 143 MMOL/L (ref 135–145)
WBC # BLD AUTO: 8.6 10E3/UL (ref 4–11)

## 2024-01-31 PROCEDURE — 250N000009 HC RX 250: Performed by: ANESTHESIOLOGY

## 2024-01-31 PROCEDURE — 94003 VENT MGMT INPAT SUBQ DAY: CPT

## 2024-01-31 PROCEDURE — 94640 AIRWAY INHALATION TREATMENT: CPT

## 2024-01-31 PROCEDURE — 999N000253 HC STATISTIC WEANING TRIALS

## 2024-01-31 PROCEDURE — 99291 CRITICAL CARE FIRST HOUR: CPT | Performed by: SURGERY

## 2024-01-31 PROCEDURE — 250N000013 HC RX MED GY IP 250 OP 250 PS 637: Performed by: STUDENT IN AN ORGANIZED HEALTH CARE EDUCATION/TRAINING PROGRAM

## 2024-01-31 PROCEDURE — 80048 BASIC METABOLIC PNL TOTAL CA: CPT | Performed by: STUDENT IN AN ORGANIZED HEALTH CARE EDUCATION/TRAINING PROGRAM

## 2024-01-31 PROCEDURE — 82805 BLOOD GASES W/O2 SATURATION: CPT | Performed by: INTERNAL MEDICINE

## 2024-01-31 PROCEDURE — 83735 ASSAY OF MAGNESIUM: CPT | Performed by: HOSPITALIST

## 2024-01-31 PROCEDURE — 250N000013 HC RX MED GY IP 250 OP 250 PS 637: Performed by: INTERNAL MEDICINE

## 2024-01-31 PROCEDURE — 250N000011 HC RX IP 250 OP 636: Performed by: STUDENT IN AN ORGANIZED HEALTH CARE EDUCATION/TRAINING PROGRAM

## 2024-01-31 PROCEDURE — 250N000011 HC RX IP 250 OP 636: Performed by: ANESTHESIOLOGY

## 2024-01-31 PROCEDURE — 250N000011 HC RX IP 250 OP 636: Performed by: INTERNAL MEDICINE

## 2024-01-31 PROCEDURE — 200N000001 HC R&B ICU

## 2024-01-31 PROCEDURE — 85027 COMPLETE CBC AUTOMATED: CPT | Performed by: STUDENT IN AN ORGANIZED HEALTH CARE EDUCATION/TRAINING PROGRAM

## 2024-01-31 PROCEDURE — 99232 SBSQ HOSP IP/OBS MODERATE 35: CPT | Performed by: INTERNAL MEDICINE

## 2024-01-31 PROCEDURE — 250N000009 HC RX 250: Performed by: STUDENT IN AN ORGANIZED HEALTH CARE EDUCATION/TRAINING PROGRAM

## 2024-01-31 PROCEDURE — 999N000157 HC STATISTIC RCP TIME EA 10 MIN

## 2024-01-31 PROCEDURE — 94640 AIRWAY INHALATION TREATMENT: CPT | Mod: 76

## 2024-01-31 PROCEDURE — 258N000003 HC RX IP 258 OP 636: Performed by: STUDENT IN AN ORGANIZED HEALTH CARE EDUCATION/TRAINING PROGRAM

## 2024-01-31 PROCEDURE — 250N000013 HC RX MED GY IP 250 OP 250 PS 637: Performed by: ANESTHESIOLOGY

## 2024-01-31 PROCEDURE — C9113 INJ PANTOPRAZOLE SODIUM, VIA: HCPCS | Performed by: INTERNAL MEDICINE

## 2024-01-31 PROCEDURE — 99233 SBSQ HOSP IP/OBS HIGH 50: CPT | Performed by: STUDENT IN AN ORGANIZED HEALTH CARE EDUCATION/TRAINING PROGRAM

## 2024-01-31 RX ORDER — IPRATROPIUM BROMIDE AND ALBUTEROL SULFATE 2.5; .5 MG/3ML; MG/3ML
3 SOLUTION RESPIRATORY (INHALATION)
Status: DISCONTINUED | OUTPATIENT
Start: 2024-01-31 | End: 2024-02-14 | Stop reason: HOSPADM

## 2024-01-31 RX ADMIN — PROPOFOL 45 MCG/KG/MIN: 10 INJECTION, EMULSION INTRAVENOUS at 16:57

## 2024-01-31 RX ADMIN — PROPOFOL 55 MCG/KG/MIN: 10 INJECTION, EMULSION INTRAVENOUS at 20:45

## 2024-01-31 RX ADMIN — GUAIFENESIN 200 MG: 200 SOLUTION ORAL at 08:59

## 2024-01-31 RX ADMIN — SODIUM CHLORIDE, POTASSIUM CHLORIDE, SODIUM LACTATE AND CALCIUM CHLORIDE: 600; 310; 30; 20 INJECTION, SOLUTION INTRAVENOUS at 14:05

## 2024-01-31 RX ADMIN — IPRATROPIUM BROMIDE AND ALBUTEROL SULFATE 3 ML: .5; 3 SOLUTION RESPIRATORY (INHALATION) at 19:33

## 2024-01-31 RX ADMIN — Medication 15 ML: at 08:59

## 2024-01-31 RX ADMIN — LEVOTHYROXINE SODIUM 112 MCG: 0.11 TABLET ORAL at 08:59

## 2024-01-31 RX ADMIN — PANTOPRAZOLE SODIUM 40 MG: 40 INJECTION, POWDER, FOR SOLUTION INTRAVENOUS at 08:59

## 2024-01-31 RX ADMIN — GUAIFENESIN 200 MG: 200 SOLUTION ORAL at 16:57

## 2024-01-31 RX ADMIN — APIXABAN 5 MG: 5 TABLET, FILM COATED ORAL at 20:04

## 2024-01-31 RX ADMIN — GUAIFENESIN 200 MG: 200 SOLUTION ORAL at 20:04

## 2024-01-31 RX ADMIN — METHYLPREDNISOLONE SODIUM SUCCINATE 125 MG: 125 INJECTION, POWDER, FOR SOLUTION INTRAMUSCULAR; INTRAVENOUS at 19:41

## 2024-01-31 RX ADMIN — SODIUM CHLORIDE, POTASSIUM CHLORIDE, SODIUM LACTATE AND CALCIUM CHLORIDE: 600; 310; 30; 20 INJECTION, SOLUTION INTRAVENOUS at 03:25

## 2024-01-31 RX ADMIN — METOPROLOL TARTRATE 12.5 MG: 25 TABLET, FILM COATED ORAL at 20:04

## 2024-01-31 RX ADMIN — GUAIFENESIN 200 MG: 200 SOLUTION ORAL at 03:37

## 2024-01-31 RX ADMIN — Medication 1 PACKET: at 08:59

## 2024-01-31 RX ADMIN — IPRATROPIUM BROMIDE AND ALBUTEROL SULFATE 3 ML: .5; 3 SOLUTION RESPIRATORY (INHALATION) at 16:54

## 2024-01-31 RX ADMIN — PROPOFOL 45 MCG/KG/MIN: 10 INJECTION, EMULSION INTRAVENOUS at 12:20

## 2024-01-31 RX ADMIN — PAROXETINE HYDROCHLORIDE 20 MG: 20 TABLET, FILM COATED ORAL at 08:59

## 2024-01-31 RX ADMIN — GUAIFENESIN 200 MG: 200 SOLUTION ORAL at 23:35

## 2024-01-31 RX ADMIN — PROPOFOL 35 MCG/KG/MIN: 10 INJECTION, EMULSION INTRAVENOUS at 06:34

## 2024-01-31 RX ADMIN — IPRATROPIUM BROMIDE 0.5 MG: 0.5 SOLUTION RESPIRATORY (INHALATION) at 08:01

## 2024-01-31 RX ADMIN — METHYLPREDNISOLONE SODIUM SUCCINATE 125 MG: 125 INJECTION, POWDER, FOR SOLUTION INTRAMUSCULAR; INTRAVENOUS at 09:00

## 2024-01-31 RX ADMIN — APIXABAN 5 MG: 5 TABLET, FILM COATED ORAL at 08:59

## 2024-01-31 RX ADMIN — PROPOFOL 25 MCG/KG/MIN: 10 INJECTION, EMULSION INTRAVENOUS at 01:14

## 2024-01-31 RX ADMIN — IPRATROPIUM BROMIDE AND ALBUTEROL SULFATE 3 ML: .5; 3 SOLUTION RESPIRATORY (INHALATION) at 11:21

## 2024-01-31 RX ADMIN — GUAIFENESIN 200 MG: 200 SOLUTION ORAL at 12:20

## 2024-01-31 RX ADMIN — METOPROLOL TARTRATE 12.5 MG: 25 TABLET, FILM COATED ORAL at 08:59

## 2024-01-31 RX ADMIN — Medication 100 MCG/HR: at 05:54

## 2024-01-31 RX ADMIN — ATORVASTATIN CALCIUM 20 MG: 20 TABLET, FILM COATED ORAL at 08:59

## 2024-01-31 RX ADMIN — AMIODARONE HYDROCHLORIDE 200 MG: 200 TABLET ORAL at 08:59

## 2024-01-31 ASSESSMENT — ACTIVITIES OF DAILY LIVING (ADL)
ADLS_ACUITY_SCORE: 33
ADLS_ACUITY_SCORE: 36
ADLS_ACUITY_SCORE: 33
ADLS_ACUITY_SCORE: 37
ADLS_ACUITY_SCORE: 36
ADLS_ACUITY_SCORE: 36
ADLS_ACUITY_SCORE: 33
ADLS_ACUITY_SCORE: 36
ADLS_ACUITY_SCORE: 37
ADLS_ACUITY_SCORE: 36

## 2024-01-31 NOTE — CONSULTS
SPIRITUAL HEALTH SERVICES - Consult Note  [facility] ICU    Referral Source: [facility] consult; assess pt/family emotional/spiritual needs per pt's length of stay.    Pt Ewa is intubated and sedated, per RN.  Called her spouse Luis and oriented him to [facility].  He deferred  support at this time.    Plan: Informed pt's spouse how he can request  support in case his needs change.  This author and other chaplains remain available per pt/family request.     Shilo Christian M.Div., [facility]  Staff     SHS available 24/7 for emergent requests/referrals, either by paging the on-call  or by entering an ASAP/STAT consult in [facility], which will also page the on-call .

## 2024-01-31 NOTE — PROGRESS NOTES
[facility]    Medicine Progress Note - Hospitalist Service    Date of Admission:  1/22/2024    Assessment & Plan     Acute hypoxemic hypercarbic respiratory failure 2/2 severe COPD Exacerbation  Severe COPD  History of intubation with tracheostomy  Presented with COPD exacerbation; has had frequent exacerbations requiring admission, with this being her third admission in 3 weeks. She presented again with COPD exacerbation. she continued to experience air hunger and a feeling of anxiety/distress, despite seemingly adequate oxygenation and stable gases. She had received continuous nebs, nebs scheduled q2h, steroids, azithromycin, and benzos without relief. She went into AF with RVR  1/28 and was intubated for dyspnea, tachypnea.   Current vent settings Vent Mode: CMV/AC  (Continuous Mandatory Ventilation/ Assist Control)  FiO2 (%): 30 %  Resp Rate (Set): 15 breaths/min  Tidal Volume (Set, mL): 350 mL  PEEP (cm H2O): (S) 10 cmH2O (per provider VO)  Pressure Support (cm H2O): 10 cmH2O  Resp: 14    -On IV fentanyl and propofol, wean as able  -PTA meds include breo and prn duonebs, no maintenance anticholinergic and not on any meds to reduce exacerbations.   -Pulmonary consulted, appreciate recommendations:   -continue methylprednisolone 125 mg BID   -consider adding chronic azithromycin or Roflumilast at discharge to reduce exacerbations   -consider steroid nebs rather than inhaler at discharge to increase pulmonary steroid delivery   -consider evaluation for inflammatory/immune trigger (eosinophils, IgE) as an outpatient when off prednisone   -follow up with pulmonology at discharge  -no obvious trigger for current admission, her recent admissions have had symptoms that respond to prednisone with return of symptoms when prednisone complete; ? If she would be a patient that would consider chronic prednisone use if fails other treatment  -would benefit from outpatient palliative support for  "symptomatic management  continue vent management per intensivist  -continue sedation and analgesia, pantoprazole    Afib with RVR  On both rate and rhythm control.  On DOAC as well.  -Continue amiodarone 200 mg daily, need alternative agent are stopping amiodarone in the long run given significant underlying COPD  -Continue Lopressor 12.5 mg twice daily       Diet: Combination Diet Regular Diet Adult  Adult Formula Drip Feeding: Continuous Vital High Protein; Orogastric tube; Goal Rate: 50; mL/hr; x22 hours (hold 1 hour before and 1 hour after synthroid administration)    DVT Prophylaxis: DOAC  Hines Catheter: PRESENT, indication: Strict 1-2 Hour I&O  Lines: PRESENT      PICC 01/28/24 Triple Lumen Left Brachial vein lateral-Site Assessment: WDL    Cardiac Monitoring: ACTIVE order. Indication: Tachyarrhythmias, acute (48 hours)  Code Status: Full Code      Clinically Significant Risk Factors                  # Hypertension: Noted on problem list        # Overweight: Estimated body mass index is 27.69 kg/m  as calculated from the following:    Height as of this encounter: 1.6 m (5' 3\").    Weight as of this encounter: 70.9 kg (156 lb 4.9 oz).             Disposition Plan           Tammie Hart MD  Hospitalist Service  [facility]  Securely message with [facility] (more info)  Text page via [facility] Paging/Directory   ______________________________________________________________________  Interval history:  No acute events overnight.  Patient continued to be intubated and sedated.      Physical Exam   Vital Signs: Temp: 98.2  F (36.8  C) Temp src: Temporal BP: 113/68 Pulse: 90   Resp: 14 SpO2: 97 % O2 Device: Mechanical Ventilator    Weight: 156 lbs 4.9 oz    General:  sedated   Cardiac: Normal rate, regular rhythm  Respiratory: ventilated, prolonged expiratory phase, decreased air movement  GI:  Abdomen soft, nontender, nondistended.  Neurologic: rouses to voice pain, sedated      Medical Decision Making "       45 MINUTES SPENT BY ME on the date of service doing chart review, history, exam, documentation & further activities per the note.      Data     I have personally reviewed the following data over the past 24 hrs:    8.6  \   12.3   / 166     143 107 32.6 (H) /  172 (H)   4.9 33 (H) 0.60 \

## 2024-01-31 NOTE — PROGRESS NOTES
[facility] ICU VENTILATOR RESPIRATORY NOTE     Date of Admission: 1/22/24  Date of Intubation (most recent): 1/28/24  Reason for Mechanical Ventilation: respiratory failure  Number of Days on Mechanical Ventilation: 4     Vent Mode: CMV/AC  (Continuous Mandatory Ventilation/ Assist Control)  FiO2 (%): 30 %  Resp Rate (Set): 15 breaths/min  Tidal Volume (Set, mL): 350 mL  PEEP (cm H2O): 5 cmH2O  Resp: 15     Patient received Atrovent neb QID. 7.0 ETT secure 25 @ teeth/gum     ETT appearance on chest x-ray:  . Stable position of endotracheal tube.   Plan: Continue to monitor.     Gabby Celis, RT

## 2024-01-31 NOTE — PROGRESS NOTES
[facility]    Pulmonary, Allergy, Critical Care and Sleep Medicine    Pulmonary Consult Progress Note    Lara Melendez MRN# [id number]   Age: 58 year old YOB: 1965     Date of Admission: 1/22/2024  Date of Service: 01/31/2024     ==================================================  INTERVAL EVENTS:  -Intubated the morning of 1/28  -Remains intubated, attempts to extubate have been met with agitation or apnea  -Fever curve and white count have improved    CHANGES FOR TODAY:  -Recheck VBG  -Her apnea/breath-holding during weaning trials may be related to alkalosis if we have corrected her respiratory acidosis but her kidneys have not yet normalized her pH.  If this is the case she may benefit from a longer period of permissive apnea before stopping a spontaneous breathing trial, or a few doses of acetazolamide      ==================================================    ASSESSMENT AND RECOMMENDATIONS:    ##Severe acute exacerbation of COPD  ## History of intubation and trach  ## Acute hypoxic and hypercarbic respiratory failure  No obvious trigger for recurrent admissions, though in the past they have seems to respond to prednisone with return of symptoms when prednisone complete.  baseline medication includes Breo and as needed DuoNebs, no maintenance anticholinergic.  Not on any maintenance medications to reduce exacerbations.  History of intubation with tracheostomy in 2023 so tracheal stenosis is a consideration, though no stridor on exam.    -Continue methylprednisolone 125 mg twice daily  -Recheck blood gas      Recommendations for discharge:  -Begin LAMA inhaler at discharge such as Spiriva  -Consider adding chronic azithromycin or Roflumilast at discharge to reduce exacerbations  -Follow-up with pulmonology after discharge  -Consider steroid nebs rather than inhaler at discharge to increase pulmonary steroid delivery  -Consider evaluation for inflammatory/immune trigger  "(eosinophils, IgE) as an outpatient when off prednisone      ## Paroxysmal atrial fibrillation with RVR  Amiodarone is being considered by cardiology.  While this is generally safe, given this patient's frequent respiratory troubles, I believe this patient has a relative contraindication to amiodarone and would recommend alternative medications if possible.        Kain Bush M.D.  Pulmonary & Critical Care  Pager: Click Here to page      I spent 35 minutes dedicated to this care so far today excluding procedures, including review of medical records, review of imaging (results & images), time with patient and time in documentation.    Pulmonary will continue to follow. We are in house at [facility] on Monday, Wednesday, and Friday. For assistance on other days, please page the on-call pulmonologist through [facility] or the .    ==================================================      PHYSICAL EXAM  /68   Pulse 90   Temp 98.2  F (36.8  C) (Temporal)   Resp 14   Ht 1.6 m (5' 3\")   Wt 70.9 kg (156 lb 4.9 oz)   SpO2 97%   BMI 27.69 kg/m        Intake/Output Summary (Last 24 hours) at 1/29/2024 1909  Last data filed at 1/29/2024 1900  Gross per 24 hour   Intake 3827.59 ml   Output 1615 ml   Net 2212.59 ml       Vitals:    01/29/24 0430 01/30/24 0357 01/31/24 0345   Weight: 66.4 kg (146 lb 6.2 oz) 66.8 kg (147 lb 4.3 oz) 70.9 kg (156 lb 4.9 oz)           General: Sedated, intubated  Resp: Faint wheezes and prolonged expiratory phase  Cardiac: RRR, NS1,S2, No m/r/g  Abdomen: Soft, normal active bowel sounds  Extremities: No LE edema or obvious joint abnormalities  Skin: Warm, dry        Recent Labs   Lab Test 01/31/24  0926 01/28/24  0405 01/26/24  1049   WBC 8.6 21.5* 6.6   RBC 3.81 4.79 4.29   HGB 12.3 15.4 13.5    268 190       Recent Labs   Lab Test 01/31/24  0517 01/31/24  0349 01/30/24  2343 01/30/24  0804 01/30/24  0754 01/29/24  0817 01/29/24  0422 01/28/24  0800 01/28/24  0405     " "--   --   --   --   --  139  --  141   POTASSIUM 4.9  --   --   --   --   --  4.5  --  3.9   CHLORIDE 107  --   --   --   --   --  102  --  98   CO2 33*  --   --   --   --   --  33*  --  29   BUN 32.6*  --   --   --   --   --  44.1*  --  36.0*   CR 0.60  --   --   --   --   --  1.01*  --  0.79   * 128* 149*   < >  --    < > 150*   < > 182*   EDIN 8.3*  --   --   --   --   --  8.7*  --  9.3   MAG 2.2  --   --   --  2.5*  --  2.7*  --  2.3    < > = values in this interval not displayed.           No results for input(s): \"CULT\" in the last 168 hours.      No results found for this or any previous visit (from the past 48 hour(s)).        "

## 2024-01-31 NOTE — PROGRESS NOTES
ICU Attending Note    I have seen and examined Lara Melendez, reviewed the patient's history, pertinent labs, vital signs, medications, physical exam, and radiographs.  The patient is critically ill by my examination and requires continued ICU monitoring and cares    Lara Melendez is admitted to ICU with a COPD exacerbation.  Has had multiple recent admissions for the same.  Recently completed a steroid taper.    Recent Events:  Sensitive to any weaning attempts.  High iPEEP    Exam:  Temp:  [96.8  F (36  C)-98.4  F (36.9  C)] 98.4  F (36.9  C)  Pulse:  [] 93  Resp:  [6-43] 21  BP: ()/() 104/68  FiO2 (%):  [30 %] 30 %  SpO2:  [82 %-100 %] 97 %  @I/O last 3 completed shifts:  In: 4096.98 [I.V.:2761.98; NG/GT:660]  Out: 1670 [Urine:1300; Stool:370]  Vent Mode: CMV/AC  (Continuous Mandatory Ventilation/ Assist Control)  FiO2 (%): 30 %  Resp Rate (Set): 15 breaths/min  Tidal Volume (Set, mL): 350 mL  PEEP (cm H2O): 5 cmH2O  Pressure Support (cm H2O): 10 cmH2O  Resp: 21    Lungs course today, scattered wheezes  Heart rrr, distant sounds  Abd ostomy bag, nontender  Ext warm, perfused    Results:  ABG   Recent Labs   Lab 01/28/24  0552   PH 7.35  7.35   PCO2 62*  62*   PO2 62*  58*   HCO3 34*  34*     CBC  Recent Labs   Lab 01/31/24  0926 01/28/24  0405 01/26/24  1049   WBC 8.6 21.5* 6.6   HGB 12.3 15.4 13.5   HCT 39.2 47.8* 43.2    268 190     BMP  Recent Labs   Lab 01/31/24  0517 01/31/24  0349 01/30/24  2343 01/30/24  1943 01/29/24  0817 01/29/24  0422 01/28/24  0800 01/28/24  0405 01/26/24  1815 01/26/24  1049     --   --   --   --  139  --  141  --  140   POTASSIUM 4.9  --   --   --   --  4.5  --  3.9  --  4.3   CHLORIDE 107  --   --   --   --  102  --  98  --  101   CO2 33*  --   --   --   --  33*  --  29  --  29   BUN 32.6*  --   --   --   --  44.1*  --  36.0*  --  19.2   CR 0.60  --   --   --   --  1.01*  --  0.79  --  0.75   * 128* 149* 98   < > 150*   < > 182*   < >  141*    < > = values in this interval not displayed.     LFT  No lab results found in last 7 days.    PancreasNo lab results found in last 7 days.  INR  Lab Results   Component Value Date    INR 1.04 06/21/2023    INR 0.97 03/26/2023    INR 0.91 10/26/2022    INR 1.04 09/27/2022       Current Issues:    Acute hypoxic and hypercarbic respiratory failure: continue steroids, furosemide, opiates for air hunger.  Ongoing vent wean.  May need a tracheostomy.n Will try to increase extrinsic PEEP to see if it makes her more comfortable.  Atrial fibrillation with RVR: beta blocker  COPD exacerbation azithro, steroids  Protein calorie malnutrition: based on exam, limited intake, and work of breathing for severe COPD.  Tube feeds.       Evaluation and management time exclusive of procedures was 30 minutes critical care time including:  examination with the ICU team, discussion of the patient's condition with other physicians and members of the care team, reviewing all data related to the patient, and time utilizing the EMR for documentation of this patient's care.      Ross Sumner MD  Acute Care Surgery/Critical Care

## 2024-01-31 NOTE — PLAN OF CARE
ICU End of Shift Summary.  For vital signs and complete assessments, please see documentation flowsheets.      Pertinent assessments: Pt intubated and sedated on propofol and fentanyl. Restless at times. Tele afib. Minimal secretions via ETT suctioning. VSS. Afebrile. Hines in place with adequate UOP. Colostomy with small stool output. TF running at goal with FWF.       Plan (Upcoming Events): tbd  Discharge/Transfer Needs: tbd     Bedside Shift Report Completed : yes  Bedside Safety Check Completed: yes    Right wrist and Left wrist restraints continued 1/31/2024    Clinical Justification: Pulling lines, pulling tubes, and pulling equipment  Less Restrictive Alternative: Repositioning, Reorientation, Pain management  Attending Physician Notified: MD ordered restraint,     New orders placed Yes  Length of Order: 1 Day      Santa De La Torre RN

## 2024-01-31 NOTE — PROGRESS NOTES
[facility]    Medicine Progress Note - Hospitalist Service    Date of Admission:  1/22/2024    Assessment & Plan     Acute hypoxemic hypercarbic respiratory failure 2/2 severe COPD Exacerbation  Severe COPD  History of intubation with tracheostomy  Presented with COPD exacerbation; has had frequent exacerbations requiring admission, with this being her third admission in 3 weeks. She presents again with COPD exacerbation, and had remained relatively well compensated until yesterday when she continued to experience air hunger and a feeling of anxiety/distress, despite seemingly adequate oxygenation and stable gases. She had received continuous nebs, nebs scheduled q2h, steroids, azithromycin, and benzos without relief. She went into AF with RVR  1/28 and was intubated for dyspnea, tachypnea. Given her description of subjective events and responses to therapies, would consider adding low dose opioid therapy for air hunger once extubated; bedside RN notes she had seemed to be very anxious and likley component of anticipatory anxiety. Possibly treating air hunger would help to suppress anxiety that worsens tachypnea, HR, leading to increased metabolic needs and shallow breathing.   -she denied improvement with nebs; suspect continuous use also contributed to increased sympathetic tone, with tachcyardia and anxiety possibly worsening anxiety as well as possibly driving rvr. Will trial atrovent QID   -PTA meds include breo and prn duonebs, no maintenance anticholinergic and not on any meds to reduce exacerbations.   -Pulmonary consulted, appreciate recommendations:   -continue methylprednisolone 125 mg BID   -consider adding chronic azithromycin or Roflumilast at discharge to reduce exacerbations   -consider steroid nebs rather than inhaler at discharge to increase pulmonary steroid delivery   -consider evaluation for inflammatory/immune trigger (eosinophils, IgE) as an outpatient when off  "prednisone   -follow up with pulmonology at discharge  -no obvious trigger for current admission, her recent admissions have had symptoms that respond to prednisone with return of symptoms when prednisone complete; ? If she would be a patient that would consider chronic prednisone use if fails other treatment  -would benefit from outpatient palliative support for symptomatic management  continue vent management per intensivist  -continue sedation and analgesia, pantoprazole    Afib with RVR  Afib with RVR overnight; pressures softer, holding dilt. She is on amiodarone PTA and given current admission will continue for now and discuss with pulmonary if this is a medication that should be continued given her severe lung disease  -lopressor 12.5 bid with hold parameters  -per pulmonary, consider alternatives to amiodarone if possible       Diet: Combination Diet Regular Diet Adult  Adult Formula Drip Feeding: Continuous Vital High Protein; Orogastric tube; Goal Rate: 45; mL/hr; Inititate at 15ml/hr and if tolerated increase by 10ml every 12 hours    DVT Prophylaxis: DOAC  Hines Catheter: PRESENT, indication: Strict 1-2 Hour I&O  Lines: PRESENT      PICC 01/28/24 Triple Lumen Left Brachial vein lateral-Site Assessment: WDL    Cardiac Monitoring: ACTIVE order. Indication: Tachyarrhythmias, acute (48 hours)  Code Status: Full Code      Clinically Significant Risk Factors                  # Hypertension: Noted on problem list        # Overweight: Estimated body mass index is 26.09 kg/m  as calculated from the following:    Height as of this encounter: 1.6 m (5' 3\").    Weight as of this encounter: 66.8 kg (147 lb 4.3 oz).             Disposition Plan           Joan Haile DO  Hospitalist Service  [facility]  Securely message with [employer] (more info)  Text page via [facility] Paging/Directory   ______________________________________________________________________        Physical Exam   Vital Signs: " Temp: 97.7  F (36.5  C) Temp src: Bladder BP: 91/69 Pulse: 68   Resp: 14 SpO2: 99 % O2 Device: Mechanical Ventilator    Weight: 147 lbs 4.28 oz    General:  sedated   Cardiac: irregular with controlled rate  Respiratory: ventilated, prolonged expiratory phase, decreased air movement  GI:  Abdomen soft, nontender, nondistended.  Neurologic: rouses to voice pain, sedated      Medical Decision Making       45 MINUTES SPENT BY ME on the date of service doing chart review, history, exam, documentation & further activities per the note.      Data     I have personally reviewed the following data over the past 24 hrs:    Procal: 0.11 CRP: N/A Lactic Acid: N/A

## 2024-01-31 NOTE — PROGRESS NOTES
ICU End of Shift Summary.  For vital signs and complete assessments, please see documentation flowsheets.      Pertinent assessments: continues in rate controlled afib, BP wnl. Unble to tolerate vent changes this am, lungs coarse, diminished. Fentanyl decreased per MD. Continues on vent support, minimal secretions. Tolerating tube feed. Hines with adequate urine output, ostomy draining brown liquid.    Plan (Upcoming Events): Continue current plan of care       Bedside Shift Report Completed : Y  Bedside Safety Check Completed: Y

## 2024-01-31 NOTE — PROGRESS NOTES
CLINICAL NUTRITION SERVICES - BRIEF NOTE    - Chart check on nutrition support patient  - Noted patient on levothyroxine, will change TF regimen to x22 hours  - labs: BUN 32.6 (H), BGM   - meds: levothyroxine, methylprednisolone, liquid MVI/M, LR @ 100 mL/hr, propofol    INTERVENTIONS  Recommendations / Nutrition Prescription  Updated TF regimen:  Type of Feeding Tube: OG  Enteral Frequency:  Continuous  Enteral Regimen: Vital High Protein @ 50 mL/hr x 22 hours (hold 1 hour before and 1 hour after synthroid administration)  Provisions: 1100 kcals, 95 g PRO, 919 ml free H20, 122 g CHO, and 0 g fiber daily.   - Propofol @ 14.1 mL/hr = 372 additional kcal  Total Enteral Provisions = 1472 kcal (22 kcal/kg)/95 g protein (1.4 g/kg) per DW 66.4 kg     - Discontinued Prosource TF20    Implementation  Enteral Nutrition - modified + discontinue Prosource TF20  Collaboration of care - discussed pt in interdisciplinary rounds    Joan Colunga, RD, LD  Clinical Dietitian - [facility]

## 2024-02-01 LAB
CK SERPL-CCNC: 87 U/L (ref 26–192)
GLUCOSE BLDC GLUCOMTR-MCNC: 128 MG/DL (ref 70–99)
GLUCOSE BLDC GLUCOMTR-MCNC: 161 MG/DL (ref 70–99)
GLUCOSE BLDC GLUCOMTR-MCNC: 171 MG/DL (ref 70–99)
GLUCOSE BLDC GLUCOMTR-MCNC: 179 MG/DL (ref 70–99)
GLUCOSE BLDC GLUCOMTR-MCNC: 196 MG/DL (ref 70–99)
MAGNESIUM SERPL-MCNC: 2 MG/DL (ref 1.7–2.3)

## 2024-02-01 PROCEDURE — 250N000011 HC RX IP 250 OP 636: Performed by: INTERNAL MEDICINE

## 2024-02-01 PROCEDURE — 94003 VENT MGMT INPAT SUBQ DAY: CPT

## 2024-02-01 PROCEDURE — 250N000013 HC RX MED GY IP 250 OP 250 PS 637: Performed by: INTERNAL MEDICINE

## 2024-02-01 PROCEDURE — 999N000157 HC STATISTIC RCP TIME EA 10 MIN

## 2024-02-01 PROCEDURE — 94640 AIRWAY INHALATION TREATMENT: CPT | Mod: 76

## 2024-02-01 PROCEDURE — 250N000011 HC RX IP 250 OP 636: Performed by: STUDENT IN AN ORGANIZED HEALTH CARE EDUCATION/TRAINING PROGRAM

## 2024-02-01 PROCEDURE — 99291 CRITICAL CARE FIRST HOUR: CPT | Performed by: SURGERY

## 2024-02-01 PROCEDURE — 250N000009 HC RX 250: Performed by: STUDENT IN AN ORGANIZED HEALTH CARE EDUCATION/TRAINING PROGRAM

## 2024-02-01 PROCEDURE — G0463 HOSPITAL OUTPT CLINIC VISIT: HCPCS

## 2024-02-01 PROCEDURE — 250N000011 HC RX IP 250 OP 636: Performed by: ANESTHESIOLOGY

## 2024-02-01 PROCEDURE — 84478 ASSAY OF TRIGLYCERIDES: CPT | Performed by: STUDENT IN AN ORGANIZED HEALTH CARE EDUCATION/TRAINING PROGRAM

## 2024-02-01 PROCEDURE — 200N000001 HC R&B ICU

## 2024-02-01 PROCEDURE — 82550 ASSAY OF CK (CPK): CPT | Performed by: STUDENT IN AN ORGANIZED HEALTH CARE EDUCATION/TRAINING PROGRAM

## 2024-02-01 PROCEDURE — 250N000013 HC RX MED GY IP 250 OP 250 PS 637: Performed by: ANESTHESIOLOGY

## 2024-02-01 PROCEDURE — 999N000253 HC STATISTIC WEANING TRIALS

## 2024-02-01 PROCEDURE — 250N000013 HC RX MED GY IP 250 OP 250 PS 637: Performed by: STUDENT IN AN ORGANIZED HEALTH CARE EDUCATION/TRAINING PROGRAM

## 2024-02-01 PROCEDURE — 94640 AIRWAY INHALATION TREATMENT: CPT

## 2024-02-01 PROCEDURE — 83735 ASSAY OF MAGNESIUM: CPT | Performed by: HOSPITALIST

## 2024-02-01 PROCEDURE — 99233 SBSQ HOSP IP/OBS HIGH 50: CPT | Performed by: STUDENT IN AN ORGANIZED HEALTH CARE EDUCATION/TRAINING PROGRAM

## 2024-02-01 PROCEDURE — 258N000003 HC RX IP 258 OP 636: Performed by: STUDENT IN AN ORGANIZED HEALTH CARE EDUCATION/TRAINING PROGRAM

## 2024-02-01 RX ADMIN — APIXABAN 5 MG: 5 TABLET, FILM COATED ORAL at 08:59

## 2024-02-01 RX ADMIN — IPRATROPIUM BROMIDE AND ALBUTEROL SULFATE 3 ML: .5; 3 SOLUTION RESPIRATORY (INHALATION) at 11:58

## 2024-02-01 RX ADMIN — SODIUM CHLORIDE, POTASSIUM CHLORIDE, SODIUM LACTATE AND CALCIUM CHLORIDE: 600; 310; 30; 20 INJECTION, SOLUTION INTRAVENOUS at 09:17

## 2024-02-01 RX ADMIN — SODIUM CHLORIDE, POTASSIUM CHLORIDE, SODIUM LACTATE AND CALCIUM CHLORIDE: 600; 310; 30; 20 INJECTION, SOLUTION INTRAVENOUS at 19:27

## 2024-02-01 RX ADMIN — AMIODARONE HYDROCHLORIDE 200 MG: 200 TABLET ORAL at 08:59

## 2024-02-01 RX ADMIN — PROPOFOL 50 MCG/KG/MIN: 10 INJECTION, EMULSION INTRAVENOUS at 08:29

## 2024-02-01 RX ADMIN — APIXABAN 5 MG: 5 TABLET, FILM COATED ORAL at 20:02

## 2024-02-01 RX ADMIN — GUAIFENESIN 200 MG: 200 SOLUTION ORAL at 16:35

## 2024-02-01 RX ADMIN — Medication 125 MCG/HR: at 02:27

## 2024-02-01 RX ADMIN — GUAIFENESIN 200 MG: 200 SOLUTION ORAL at 08:58

## 2024-02-01 RX ADMIN — GUAIFENESIN 200 MG: 200 SOLUTION ORAL at 12:35

## 2024-02-01 RX ADMIN — Medication 40 MG: at 09:08

## 2024-02-01 RX ADMIN — METOPROLOL TARTRATE 12.5 MG: 25 TABLET, FILM COATED ORAL at 20:02

## 2024-02-01 RX ADMIN — SODIUM CHLORIDE, POTASSIUM CHLORIDE, SODIUM LACTATE AND CALCIUM CHLORIDE: 600; 310; 30; 20 INJECTION, SOLUTION INTRAVENOUS at 00:30

## 2024-02-01 RX ADMIN — Medication 15 ML: at 08:59

## 2024-02-01 RX ADMIN — PROPOFOL 50 MCG/KG/MIN: 10 INJECTION, EMULSION INTRAVENOUS at 16:35

## 2024-02-01 RX ADMIN — LEVOTHYROXINE SODIUM 112 MCG: 0.11 TABLET ORAL at 08:59

## 2024-02-01 RX ADMIN — GUAIFENESIN 200 MG: 200 SOLUTION ORAL at 19:50

## 2024-02-01 RX ADMIN — IPRATROPIUM BROMIDE AND ALBUTEROL SULFATE 3 ML: .5; 3 SOLUTION RESPIRATORY (INHALATION) at 19:18

## 2024-02-01 RX ADMIN — ATORVASTATIN CALCIUM 20 MG: 20 TABLET, FILM COATED ORAL at 08:59

## 2024-02-01 RX ADMIN — PROPOFOL 50 MCG/KG/MIN: 10 INJECTION, EMULSION INTRAVENOUS at 20:06

## 2024-02-01 RX ADMIN — IPRATROPIUM BROMIDE AND ALBUTEROL SULFATE 3 ML: .5; 3 SOLUTION RESPIRATORY (INHALATION) at 16:20

## 2024-02-01 RX ADMIN — METHYLPREDNISOLONE SODIUM SUCCINATE 125 MG: 125 INJECTION, POWDER, FOR SOLUTION INTRAMUSCULAR; INTRAVENOUS at 19:50

## 2024-02-01 RX ADMIN — PROPOFOL 55 MCG/KG/MIN: 10 INJECTION, EMULSION INTRAVENOUS at 00:30

## 2024-02-01 RX ADMIN — PAROXETINE HYDROCHLORIDE 20 MG: 20 TABLET, FILM COATED ORAL at 08:59

## 2024-02-01 RX ADMIN — GUAIFENESIN 200 MG: 200 SOLUTION ORAL at 04:17

## 2024-02-01 RX ADMIN — PROPOFOL 50 MCG/KG/MIN: 10 INJECTION, EMULSION INTRAVENOUS at 12:35

## 2024-02-01 RX ADMIN — PROPOFOL 55 MCG/KG/MIN: 10 INJECTION, EMULSION INTRAVENOUS at 03:53

## 2024-02-01 RX ADMIN — IPRATROPIUM BROMIDE AND ALBUTEROL SULFATE 3 ML: .5; 3 SOLUTION RESPIRATORY (INHALATION) at 08:45

## 2024-02-01 RX ADMIN — METHYLPREDNISOLONE SODIUM SUCCINATE 125 MG: 125 INJECTION, POWDER, FOR SOLUTION INTRAMUSCULAR; INTRAVENOUS at 08:59

## 2024-02-01 RX ADMIN — METOPROLOL TARTRATE 12.5 MG: 25 TABLET, FILM COATED ORAL at 08:59

## 2024-02-01 RX ADMIN — Medication 125 MCG/HR: at 21:28

## 2024-02-01 ASSESSMENT — ACTIVITIES OF DAILY LIVING (ADL)
ADLS_ACUITY_SCORE: 37

## 2024-02-01 NOTE — PROGRESS NOTES
[facility]    Medicine Progress Note - Hospitalist Service    Date of Admission:  1/22/2024    Assessment & Plan     Acute hypoxemic hypercarbic respiratory failure 2/2 severe COPD Exacerbation  Severe COPD  History of intubation with tracheostomy  Presented with COPD exacerbation; has had frequent exacerbations requiring admission, with this being her third admission in 3 weeks. She presented again with COPD exacerbation. she continued to experience air hunger and a feeling of anxiety/distress, despite seemingly adequate oxygenation and stable gases. She had received continuous nebs, nebs scheduled q2h, steroids, azithromycin, and benzos without relief. She went into AF with RVR  1/28 and was intubated for dyspnea, tachypnea.   Current vent settings Vent Mode: CMV/AC  (Continuous Mandatory Ventilation/ Assist Control)  FiO2 (%): 30 %  Resp Rate (Set): 15 breaths/min  Tidal Volume (Set, mL): 350 mL  PEEP (cm H2O): 5 cmH2O  Pressure Support (cm H2O): 10 cmH2O  Resp: 15    -On IV fentanyl and propofol, wean as able  -PTA meds include breo and prn duonebs, no maintenance anticholinergic and not on any meds to reduce exacerbations.   -Pulmonary consulted, appreciate recommendations:   -continue methylprednisolone 125 mg BID                -continue duonebs QID                -Over all patient does not seem to be making progress. May                  need tracheostomy.                 -continue sedation and analgesia, pantoprazole               -vent management per crit care     Plan upon discharge per pulmonology:  consider adding chronic azithromycin or Roflumilast at discharge to reduce exacerbations  consider steroid nebs rather than inhaler at discharge to increase pulmonary steroid delivery  consider evaluation for inflammatory/immune trigger (eosinophils, IgE) as an outpatient when off prednisone  follow up with pulmonology at discharge  no obvious trigger for current admission, her recent  "admissions have had symptoms that respond to prednisone with return of symptoms when prednisone complete; ? If she would be a patient that would consider chronic prednisone use if fails other treatment  would benefit from outpatient palliative support for symptomatic management    Afib with RVR  On both rate and rhythm control.  On DOAC as well.  -Continue amiodarone 200 mg daily, need alternative agent as amiodarone not a good option in the long run given significant underlying COPD  -Continue Lopressor 12.5 mg twice daily       Diet: Combination Diet Regular Diet Adult  Adult Formula Drip Feeding: Continuous Vital High Protein; Orogastric tube; Goal Rate: 50; mL/hr; x22 hours (hold 1 hour before and 1 hour after synthroid administration)    DVT Prophylaxis: DOAC  Hines Catheter: PRESENT, indication: Strict 1-2 Hour I&O  Lines: PRESENT      PICC 01/28/24 Triple Lumen Left Brachial vein lateral-Site Assessment: WDL    Cardiac Monitoring: ACTIVE order. Indication: Tachyarrhythmias, acute (48 hours)  Code Status: Full Code      Clinically Significant Risk Factors                  # Hypertension: Noted on problem list        # Overweight: Estimated body mass index is 29.06 kg/m  as calculated from the following:    Height as of this encounter: 1.6 m (5' 3\").    Weight as of this encounter: 74.4 kg (164 lb 0.4 oz).             Disposition Plan           Tammie Hart MD  Hospitalist Service  [facility]  Securely message with [employer] (more info)  Text page via [facility] Paging/Directory   ______________________________________________________________________  Interval history:  Not able to tolerate pressure support trial for 5 minutes then became apneic for 60 seconds.  She continues to be on fentanyl and propofol currently fentanyl 125 mcg and propofol at 50.      Physical Exam   Vital Signs: Temp: 97.6  F (36.4  C) Temp src: Temporal BP: 105/58 Pulse: 75   Resp: 15 SpO2: 100 % O2 Device: Mechanical " Ventilator    Weight: 164 lbs .36 oz    General:  sedated and intubated   Cardiac: Normal rate, regular rhythm  Respiratory: ventilated, prolonged expiratory phase, decreased air movement, diffuse wheezing   GI:  Abdomen soft, nontender, nondistended.  Neurologic: rouses to pain, sedated      Medical Decision Making       45 MINUTES SPENT BY ME on the date of service doing chart review, history, exam, documentation & further activities per the note.      Data     I have personally reviewed the following data over the past 24 hrs:    8.6  \   12.3   / 166     N/A N/A N/A /  171 (H)   N/A N/A N/A \

## 2024-02-01 NOTE — PROGRESS NOTES
"[facility] Nurse Inpatient Assessment     Consulted for: Colostomy    Patient History (according to provider note(s):      Lara Melendez is a 58 year old female with a past medical history of COPD, atrial fibrillation, chronic anticoagulation with Eliquis, hypothyroidism, hypertension and anxiety with multiple recent admissions for COPD exacerbation who presents with shortness of breath, cough and wheezing.       Assessment:      Areas visualized during today's visit: Focused:    Assessment of established loop Colostomy:  Diagnosis Pertinent to Stoma: Bowel Obstruction      Surgery Date: 7/12/23  Surgeon:[facility]: [facility]  Pouching system in place on assessment today: [place] one piece, cut to fit, convex, barrier ring, ostomy strips, and ostomy powder   Pouch barrier status: Leaking at  4-8 o clock  Pouch last changed/wear time: 2-3 days  Reason for pouch change today: leakage and routine schedule  Effectiveness of current pouching/ supply plan: Ineffective  Change made with ostomy management today: Yes  Pouching system placed today: [place] one piece, cut to fit, flat, barrier ring, No Sting    Supplies: ordered pouches, rings  Last photo: 2/1/24    Stoma location: Upper midline  Stoma size: 1 3/4 inches, protrudes 5cm  Stoma appearance: pink-red and prolapsed  Mucocutaneous junction:  intact  Peristomal complication(s): Moisture-Associated Skin Damage (MASD) due to leakage-now with only minor erythema  Output: thin and brown  Output volume emptied during visit: 0ml  Abdominal assessment: Soft    Face to face time: 20 minutes           Treatment Plan:     Midline Colostomy pouching plan:   Pouching system: ostomy supplies pouches: [place] FECAL ([id number])   Accessories used: [facility] ostomy accessories: 2\" Cera Barrier Ring ([id number]) and Cavilon no sting barrier film ([id number])   Frequency of pouch changes: Three times a week  [facility] follow up plan:  1-2 times a " week  Bedside RN interventions: Change pouch PRN if leaking using the supplies above, Empty pouch when 1/3 to 1/2 full, ensure to clean pouch outlet after emptying to prevent odor, and Notify WOC for ongoing pouch leakage      Orders: Updated    DATA:     Current support surface: Standard  Standard gel/foam mattress (IsoFlex, Atmos air, etc)  Containment of urine/stool: Colostomy pouch  BMI: Body mass index is 29.06 kg/m .   Active diet order: Orders Placed This Encounter      Combination Diet Regular Diet Adult     Output: I/O last 3 completed shifts:  In: 4573.93 [I.V.:2908.93; NG/GT:750]  Out: 1460 [Urine:1375; Stool:85]     Labs:   Recent Labs   Lab 01/31/24  0926   HGB 12.3   WBC 8.6     Pressure injury risk assessment:   Sensory Perception: 2-->very limited  Moisture: 3-->occasionally moist  Activity: 1-->bedfast  Mobility: 2-->very limited  Nutrition: 3-->adequate  Friction and Shear: 2-->potential problem  Lee Score: 13    EDINSON Alexander [facility] [employer]  Dept. Office Number: [phone]

## 2024-02-01 NOTE — PROGRESS NOTES
ICU Attending Note    I have seen and examined Lara Melendez, reviewed the patient's history, pertinent labs, vital signs, medications, physical exam, and radiographs.  The patient is critically ill by my examination and requires continued ICU monitoring and cares    Lara Melendez is admitted to ICU with a COPD exacerbation.  Has had multiple recent admissions for the same.  Recently completed a steroid taper.    Recent Events:  Sensitive to any weaning attempts.  Not alkalotic by VBG.      Exam:  Temp:  [97.4  F (36.3  C)-98.2  F (36.8  C)] 97.6  F (36.4  C)  Pulse:  [] 78  Resp:  [7-21] 14  BP: ()/() 109/78  FiO2 (%):  [30 %] 30 %  SpO2:  [94 %-100 %] 98 %  @I/O last 3 completed shifts:  In: 4573.93 [I.V.:2908.93; NG/GT:750]  Out: 1460 [Urine:1375; Stool:85]  Vent Mode: CMV/AC  (Continuous Mandatory Ventilation/ Assist Control)  FiO2 (%): 30 %  Resp Rate (Set): 15 breaths/min  Tidal Volume (Set, mL): 350 mL  PEEP (cm H2O): 5 cmH2O  Pressure Support (cm H2O): 10 cmH2O  Resp: 14    Lungs course today, scattered wheezes  Heart rrr, distant sounds  Abd ostomy bag, nontender  Ext warm, perfused    Results:  ABG   Recent Labs   Lab 01/28/24  0552   PH 7.35  7.35   PCO2 62*  62*   PO2 62*  58*   HCO3 34*  34*     CBC  Recent Labs   Lab 01/31/24  0926 01/28/24  0405 01/26/24  1049   WBC 8.6 21.5* 6.6   HGB 12.3 15.4 13.5   HCT 39.2 47.8* 43.2    268 190     BMP  Recent Labs   Lab 02/01/24  0803 02/01/24  0436 02/01/24  0041 01/31/24  2022 01/31/24  0759 01/31/24  0517 01/29/24  0817 01/29/24  0422 01/28/24  0800 01/28/24  0405 01/26/24  1815 01/26/24  1049   NA  --   --   --   --   --  143  --  139  --  141  --  140   POTASSIUM  --   --   --   --   --  4.9  --  4.5  --  3.9  --  4.3   CHLORIDE  --   --   --   --   --  107  --  102  --  98  --  101   CO2  --   --   --   --   --  33*  --  33*  --  29  --  29   BUN  --   --   --   --   --  32.6*  --  44.1*  --  36.0*  --  19.2   CR  --   --    --   --   --  0.60  --  1.01*  --  0.79  --  0.75   * 179* 196* 173*   < > 172*   < > 150*   < > 182*   < > 141*    < > = values in this interval not displayed.     LFT  No lab results found in last 7 days.    PancreasNo lab results found in last 7 days.  INR  Lab Results   Component Value Date    INR 1.04 06/21/2023    INR 0.97 03/26/2023    INR 0.91 10/26/2022    INR 1.04 09/27/2022       Current Issues:    Acute hypoxic and hypercarbic respiratory failure: continue steroids, furosemide, opiates for air hunger.  Ongoing vent wean. Looks like tracheostomy will be required. Atrial fibrillation with RVR: beta blocker  COPD exacerbation , treated  Protein calorie malnutrition: based on exam, limited intake, and work of breathing for severe COPD.  Tube feeds.   Atrial fibrillation: on amio, need a long term plan.  Lopressor. DOAC.      Evaluation and management time exclusive of procedures was 30 minutes critical care time including:  examination with the ICU team, discussion of the patient's condition with other physicians and members of the care team, reviewing all data related to the patient, and time utilizing the EMR for documentation of this patient's care.      Ross Sumner MD  Acute Care Surgery/Critical Care

## 2024-02-01 NOTE — PROGRESS NOTES
[facility] ICU VENTILATOR RESPIRATORY NOTE     Date of Admission: 1/22/24  Date of Intubation (most recent): 1/28/24  Reason for Mechanical Ventilation: Respiratory failure  Number of Days on Mechanical Ventilation: 4  Reason for No Pressure Support Trial: Pt fail weaning trial this AM on PS 10/+5 due to desat and apnea. PEEP increased to 10 cmH2O this afternoon  Significant Events Today: PEEP increased to 10 cmH2O  Venous Blood Gas  Recent Labs   Lab 01/31/24  1659 01/29/24  2205 01/28/24  0552 01/28/24  0405 01/27/24  0620   PHV 7.35 7.28*  --  7.41 7.40   PCO2V 65* 72*  --  52* 53*   PO2V 39 95*  --  55* 77*   HCO3V 36* 34*  --  33* 33*   SUSANNAH 8.2* 5.4*  --  6.4* 6.1*   O2PER 30 35 30  30 35 30     ETT appearance on chest x-ray: 3.5cm above stevie    Vent Mode: CMV/AC  (Continuous Mandatory Ventilation/ Assist Control)  FiO2 (%): 30 %  Resp Rate (Set): 15 breaths/min  Tidal Volume (Set, mL): 350 mL  PEEP (cm H2O): 10 cmH2O  Pressure Support (cm H2O): 10 cmH2O  Resp: 15      Plan:  RT to continue to monitor and assess the pt's current respiratory status and needs.      Ulices Escalante, RT

## 2024-02-01 NOTE — PROGRESS NOTES
[facility] ICU VENTILATOR RESPIRATORY NOTE     Date of Admission: 1/22/24  Date of Intubation (most recent): 1/28/24  Reason for Mechanical Ventilation: respiratory failure  Number of Days on Mechanical Ventilation: 5     Vent Mode: CMV/AC  (Continuous Mandatory Ventilation/ Assist Control)  FiO2 (%): 30 %  Resp Rate (Set): 15 breaths/min  Tidal Volume (Set, mL): 350 mL  PEEP (cm H2O): 5 cmH2O  Resp: 15     Patient received Duoneb QID. 7.0 ETT secure 25 @ teeth/gum     ETT appearance on chest x-ray:  . Stable position of endotracheal tube.     Placed pt on weaning trial CPAP +5 PS10 , 30% starting at 0538 and ended at 0543 for a total of 5 minutes. Pt went apneic for 60 seconds.         Plan: Continue to monitor.      Gabby Celis, RT

## 2024-02-01 NOTE — PROGRESS NOTES
[facility] ICU VENTILATOR RESPIRATORY NOTE  Date of Admission: 01/22/2024  Date of Intubation (most recent): 01/28/2024  Reason for Mechanical Ventilation: Respiratory failure  Number of Days on Mechanical Ventilation: 5  Met Criteria for Pressure Support Trial: Yes  Reason for Stopping Pressure Support Trial: Pt went apneic   Significant Events Today: Pt failed PS 10/+8 weaning trial twice today due to apneic   Venous Blood Gas  Recent Labs   Lab 01/31/24  1659 01/29/24  2205 01/28/24  0552 01/28/24  0405 01/27/24  0620   PHV 7.35 7.28*  --  7.41 7.40   PCO2V 65* 72*  --  52* 53*   PO2V 39 95*  --  55* 77*   HCO3V 36* 34*  --  33* 33*   SUSANNAH 8.2* 5.4*  --  6.4* 6.1*   O2PER 30 35 30  30 35 30     ETT appearance on chest x-ray: 3.5 cm above stveie  Sx: small thick cloudy/yellow    Current Vent settings:  Vent Mode: CMV/AC  (Continuous Mandatory Ventilation/ Assist Control)  FiO2 (%): 30 %  Resp Rate (Set): 15 breaths/min  Tidal Volume (Set, mL): 350 mL  PEEP (cm H2O): 10 cmH2O  Pressure Support (cm H2O): 10 cmH2O  Resp: 11      02/01/24 1620    Ventilator - Patient    Patient Resp Rate  15 breaths/min   Expiratory Vt (ml) 365   Minute Volume (ml) 5.17 L/min   Peak Inspiratory Pressure (cm H2O) 29 cmH2O   Mean Airway Pressure (cm H2O) 14 cmH2O   Plateau Pressure (cm H2O) 21 cmH2O   Dynamic Compliance (mL/cm H2O) 37.4 mL/cm H2O   Airway Resistance 28   Auto/ Intrinsic PEEP (cm H2O) 4.3 cmH2O       Plan:  Assess for weaning in AM. RT to continue to monitor and assess.       Ulices Escalante, RT

## 2024-02-01 NOTE — PLAN OF CARE
Goal Outcome Evaluation:      Plan of Care Reviewed With: patient, spouse, child    Overall Patient Progress: no changeOverall Patient Progress: no change       ICU End of Shift Summary.  For vital signs and complete assessments, please see documentation flowsheets.     Pertinent assessments: RASS goal -1 to -2. LS wheezes, BS audible - colostomy intact, small output. Hines in place. Tele - Afib CVR/RVR  Major Shift Events:   - Sedation titrated   - PEEP increased to 10 per intensivist at bedside  Plan (Upcoming Events): Continue plan of care  Discharge/Transfer Needs: TBD    Bedside Shift Report Completed : Y  Bedside Safety Check Completed: Y

## 2024-02-01 NOTE — PLAN OF CARE
ICU End of Shift Summary.  For vital signs and complete assessments, please see documentation flowsheets.      Pertinent assessments: Pt intubated and sedated on propofol and fentanyl. Tele afib. Small amount of thick secretions via ETT suctioning. VSS. Afebrile. Hines in place with ~50ml/hr UOP. TF running at goal with FWF. Colostomy with small stool output.     Major Shift Events:    -Pt tolerated PS for ~5 min this AM, stopped d/t pt being apneic for approx 1 min    Plan (Upcoming Events): tbd  Discharge/Transfer Needs: tbd     Bedside Shift Report Completed : yes  Bedside Safety Check Completed: yes      Right wrist and Left wrist restraints continued 2/1/2024    Clinical Justification: Pulling lines, pulling tubes, and pulling equipment  Less Restrictive Alternative: Repositioning, De-escalation, Reorientation  Attending Physician Notified: MD ordered restraint,     New orders placed Yes  Length of Order: 1 Day      Santa De La Torre RN

## 2024-02-02 LAB
ANION GAP SERPL CALCULATED.3IONS-SCNC: 5 MMOL/L (ref 7–15)
BUN SERPL-MCNC: 29.7 MG/DL (ref 8–23)
CALCIUM SERPL-MCNC: 8.1 MG/DL (ref 8.8–10.2)
CHLORIDE SERPL-SCNC: 103 MMOL/L (ref 98–107)
CREAT SERPL-MCNC: 0.55 MG/DL (ref 0.51–0.95)
DEPRECATED HCO3 PLAS-SCNC: 33 MMOL/L (ref 22–29)
EGFRCR SERPLBLD CKD-EPI 2021: >90 ML/MIN/1.73M2
ERYTHROCYTE [DISTWIDTH] IN BLOOD BY AUTOMATED COUNT: 13.5 % (ref 10–15)
GLUCOSE BLDC GLUCOMTR-MCNC: 154 MG/DL (ref 70–99)
GLUCOSE BLDC GLUCOMTR-MCNC: 164 MG/DL (ref 70–99)
GLUCOSE BLDC GLUCOMTR-MCNC: 167 MG/DL (ref 70–99)
GLUCOSE BLDC GLUCOMTR-MCNC: 177 MG/DL (ref 70–99)
GLUCOSE BLDC GLUCOMTR-MCNC: 182 MG/DL (ref 70–99)
GLUCOSE BLDC GLUCOMTR-MCNC: 185 MG/DL (ref 70–99)
GLUCOSE SERPL-MCNC: 166 MG/DL (ref 70–99)
HCT VFR BLD AUTO: 37.6 % (ref 35–47)
HGB BLD-MCNC: 12.2 G/DL (ref 11.7–15.7)
MAGNESIUM SERPL-MCNC: 2 MG/DL (ref 1.7–2.3)
MCH RBC QN AUTO: 33.3 PG (ref 26.5–33)
MCHC RBC AUTO-ENTMCNC: 32.4 G/DL (ref 31.5–36.5)
MCV RBC AUTO: 103 FL (ref 78–100)
PLATELET # BLD AUTO: 167 10E3/UL (ref 150–450)
POTASSIUM SERPL-SCNC: 4.9 MMOL/L (ref 3.4–5.3)
RBC # BLD AUTO: 3.66 10E6/UL (ref 3.8–5.2)
SODIUM SERPL-SCNC: 141 MMOL/L (ref 135–145)
TRIGL SERPL-MCNC: 69 MG/DL
WBC # BLD AUTO: 18.1 10E3/UL (ref 4–11)

## 2024-02-02 PROCEDURE — C9113 INJ PANTOPRAZOLE SODIUM, VIA: HCPCS | Performed by: INTERNAL MEDICINE

## 2024-02-02 PROCEDURE — 250N000011 HC RX IP 250 OP 636: Performed by: STUDENT IN AN ORGANIZED HEALTH CARE EDUCATION/TRAINING PROGRAM

## 2024-02-02 PROCEDURE — 250N000009 HC RX 250: Performed by: STUDENT IN AN ORGANIZED HEALTH CARE EDUCATION/TRAINING PROGRAM

## 2024-02-02 PROCEDURE — 250N000013 HC RX MED GY IP 250 OP 250 PS 637: Performed by: STUDENT IN AN ORGANIZED HEALTH CARE EDUCATION/TRAINING PROGRAM

## 2024-02-02 PROCEDURE — 99291 CRITICAL CARE FIRST HOUR: CPT | Performed by: SURGERY

## 2024-02-02 PROCEDURE — 250N000011 HC RX IP 250 OP 636: Performed by: INTERNAL MEDICINE

## 2024-02-02 PROCEDURE — 250N000013 HC RX MED GY IP 250 OP 250 PS 637: Performed by: ANESTHESIOLOGY

## 2024-02-02 PROCEDURE — 80048 BASIC METABOLIC PNL TOTAL CA: CPT | Performed by: STUDENT IN AN ORGANIZED HEALTH CARE EDUCATION/TRAINING PROGRAM

## 2024-02-02 PROCEDURE — 94003 VENT MGMT INPAT SUBQ DAY: CPT

## 2024-02-02 PROCEDURE — 94640 AIRWAY INHALATION TREATMENT: CPT | Mod: 76

## 2024-02-02 PROCEDURE — 83735 ASSAY OF MAGNESIUM: CPT | Performed by: HOSPITALIST

## 2024-02-02 PROCEDURE — 250N000011 HC RX IP 250 OP 636: Performed by: ANESTHESIOLOGY

## 2024-02-02 PROCEDURE — 258N000003 HC RX IP 258 OP 636: Performed by: STUDENT IN AN ORGANIZED HEALTH CARE EDUCATION/TRAINING PROGRAM

## 2024-02-02 PROCEDURE — 99233 SBSQ HOSP IP/OBS HIGH 50: CPT | Performed by: STUDENT IN AN ORGANIZED HEALTH CARE EDUCATION/TRAINING PROGRAM

## 2024-02-02 PROCEDURE — 999N000253 HC STATISTIC WEANING TRIALS

## 2024-02-02 PROCEDURE — 85027 COMPLETE CBC AUTOMATED: CPT | Performed by: STUDENT IN AN ORGANIZED HEALTH CARE EDUCATION/TRAINING PROGRAM

## 2024-02-02 PROCEDURE — 250N000013 HC RX MED GY IP 250 OP 250 PS 637: Performed by: INTERNAL MEDICINE

## 2024-02-02 PROCEDURE — 999N000157 HC STATISTIC RCP TIME EA 10 MIN

## 2024-02-02 PROCEDURE — 200N000001 HC R&B ICU

## 2024-02-02 RX ORDER — FUROSEMIDE 10 MG/ML
20 INJECTION INTRAMUSCULAR; INTRAVENOUS EVERY 6 HOURS
Status: COMPLETED | OUTPATIENT
Start: 2024-02-02 | End: 2024-02-02

## 2024-02-02 RX ORDER — METOPROLOL TARTRATE 25 MG/1
25 TABLET, FILM COATED ORAL 2 TIMES DAILY
Status: DISCONTINUED | OUTPATIENT
Start: 2024-02-02 | End: 2024-02-04

## 2024-02-02 RX ORDER — CIPROFLOXACIN HYDROCHLORIDE 3.5 MG/ML
1 SOLUTION/ DROPS TOPICAL 4 TIMES DAILY
Status: DISCONTINUED | OUTPATIENT
Start: 2024-02-02 | End: 2024-02-02

## 2024-02-02 RX ADMIN — CLONAZEPAM 0.5 MG: 0.5 TABLET ORAL at 08:18

## 2024-02-02 RX ADMIN — FUROSEMIDE 20 MG: 10 INJECTION, SOLUTION INTRAMUSCULAR; INTRAVENOUS at 16:13

## 2024-02-02 RX ADMIN — FUROSEMIDE 20 MG: 10 INJECTION, SOLUTION INTRAMUSCULAR; INTRAVENOUS at 11:35

## 2024-02-02 RX ADMIN — Medication 15 ML: at 08:17

## 2024-02-02 RX ADMIN — AMIODARONE HYDROCHLORIDE 200 MG: 200 TABLET ORAL at 08:18

## 2024-02-02 RX ADMIN — PAROXETINE HYDROCHLORIDE 20 MG: 20 TABLET, FILM COATED ORAL at 08:17

## 2024-02-02 RX ADMIN — METOPROLOL TARTRATE 25 MG: 25 TABLET, FILM COATED ORAL at 20:53

## 2024-02-02 RX ADMIN — IPRATROPIUM BROMIDE AND ALBUTEROL SULFATE 3 ML: .5; 3 SOLUTION RESPIRATORY (INHALATION) at 15:43

## 2024-02-02 RX ADMIN — PROPOFOL 50 MCG/KG/MIN: 10 INJECTION, EMULSION INTRAVENOUS at 04:42

## 2024-02-02 RX ADMIN — GUAIFENESIN 200 MG: 200 SOLUTION ORAL at 00:00

## 2024-02-02 RX ADMIN — GUAIFENESIN 200 MG: 200 SOLUTION ORAL at 03:13

## 2024-02-02 RX ADMIN — APIXABAN 5 MG: 5 TABLET, FILM COATED ORAL at 20:53

## 2024-02-02 RX ADMIN — Medication 100 MCG/HR: at 20:51

## 2024-02-02 RX ADMIN — METHYLPREDNISOLONE SODIUM SUCCINATE 125 MG: 125 INJECTION, POWDER, FOR SOLUTION INTRAMUSCULAR; INTRAVENOUS at 08:02

## 2024-02-02 RX ADMIN — IPRATROPIUM BROMIDE AND ALBUTEROL SULFATE 3 ML: .5; 3 SOLUTION RESPIRATORY (INHALATION) at 07:36

## 2024-02-02 RX ADMIN — PROPOFOL 30 MCG/KG/MIN: 10 INJECTION, EMULSION INTRAVENOUS at 20:51

## 2024-02-02 RX ADMIN — GUAIFENESIN 200 MG: 200 SOLUTION ORAL at 08:17

## 2024-02-02 RX ADMIN — PROPOFOL 50 MCG/KG/MIN: 10 INJECTION, EMULSION INTRAVENOUS at 00:55

## 2024-02-02 RX ADMIN — SODIUM CHLORIDE, POTASSIUM CHLORIDE, SODIUM LACTATE AND CALCIUM CHLORIDE: 600; 310; 30; 20 INJECTION, SOLUTION INTRAVENOUS at 04:51

## 2024-02-02 RX ADMIN — IPRATROPIUM BROMIDE AND ALBUTEROL SULFATE 3 ML: .5; 3 SOLUTION RESPIRATORY (INHALATION) at 11:21

## 2024-02-02 RX ADMIN — GUAIFENESIN 200 MG: 200 SOLUTION ORAL at 16:08

## 2024-02-02 RX ADMIN — LEVOTHYROXINE SODIUM 112 MCG: 0.11 TABLET ORAL at 08:18

## 2024-02-02 RX ADMIN — PROPOFOL 20 MCG/KG/MIN: 10 INJECTION, EMULSION INTRAVENOUS at 12:43

## 2024-02-02 RX ADMIN — PANTOPRAZOLE SODIUM 40 MG: 40 INJECTION, POWDER, FOR SOLUTION INTRAVENOUS at 07:59

## 2024-02-02 RX ADMIN — IPRATROPIUM BROMIDE AND ALBUTEROL SULFATE 3 ML: .5; 3 SOLUTION RESPIRATORY (INHALATION) at 20:02

## 2024-02-02 RX ADMIN — ATORVASTATIN CALCIUM 20 MG: 20 TABLET, FILM COATED ORAL at 08:18

## 2024-02-02 RX ADMIN — METOPROLOL TARTRATE 12.5 MG: 25 TABLET, FILM COATED ORAL at 08:18

## 2024-02-02 RX ADMIN — GUAIFENESIN 200 MG: 200 SOLUTION ORAL at 19:52

## 2024-02-02 RX ADMIN — GUAIFENESIN 200 MG: 200 SOLUTION ORAL at 11:35

## 2024-02-02 RX ADMIN — APIXABAN 5 MG: 5 TABLET, FILM COATED ORAL at 08:18

## 2024-02-02 ASSESSMENT — ACTIVITIES OF DAILY LIVING (ADL)
ADLS_ACUITY_SCORE: 33
ADLS_ACUITY_SCORE: 34
ADLS_ACUITY_SCORE: 33
ADLS_ACUITY_SCORE: 33

## 2024-02-02 NOTE — PROGRESS NOTES
[facility]    Medicine Progress Note - Hospitalist Service    Date of Admission:  1/22/2024    Assessment & Plan     Acute hypoxemic hypercarbic respiratory failure 2/2 severe COPD Exacerbation  Severe COPD  History of intubation with tracheostomy  Presented with COPD exacerbation; has had frequent exacerbations requiring admission, with this being her third admission in 3 weeks. She presented again with COPD exacerbation. she continued to experience air hunger and a feeling of anxiety/distress, despite seemingly adequate oxygenation and stable gases. She had received continuous nebs, nebs scheduled q2h, steroids, azithromycin, and benzos without relief. She went into AF with RVR  1/28 and was intubated for dyspnea, tachypnea. No significant improvement since intubation. Appears at least 10 L up since admission .     -On IV fentanyl and propofol, wean as able  -PTA meds include breo and prn duonebs, no maintenance anticholinergic and not on any meds to reduce exacerbations.   -Pulmonary consulted, appreciate recommendations:   -stop IV  methylprednisolone 125 mg BID, has received 11 doses                    of  125 BID and 4 doses of 62.5                 -IV lasix 20 mg q6h for 2 doses, reassess tomorrow AM                -continue duonebs QID                -Over all patient does not seem to be making progress. May                  need tracheostomy.                 -continue sedation and analgesia, pantoprazole               -vent management per crit care     Plan upon discharge per pulmonology:  consider adding chronic azithromycin or Roflumilast at discharge to reduce exacerbations  consider steroid nebs rather than inhaler at discharge to increase pulmonary steroid delivery  consider evaluation for inflammatory/immune trigger (eosinophils, IgE) as an outpatient when off prednisone  follow up with pulmonology at discharge  no obvious trigger for current admission, her recent admissions  "have had symptoms that respond to prednisone with return of symptoms when prednisone complete; ? If she would be a patient that would consider chronic prednisone use if fails other treatment  would benefit from outpatient palliative support for symptomatic management    Afib with RVR  On both rate and rhythm control.  On DOAC as well.  -Continue amiodarone 200 mg daily, need alternative agent as amiodarone not a good option in the long run given significant underlying COPD  -increase Lopressor 25 mg twice daily       Diet: Combination Diet Regular Diet Adult  Adult Formula Drip Feeding: Continuous Vital High Protein; Orogastric tube; Goal Rate: 60; mL/hr; x22 hours (hold 1 hour before and 1 hour after synthroid administration)    DVT Prophylaxis: DOAC  Hines Catheter: PRESENT, indication: Strict 1-2 Hour I&O  Lines: PRESENT      PICC 01/28/24 Triple Lumen Left Brachial vein lateral-Site Assessment: WDL    Cardiac Monitoring: ACTIVE order. Indication: Tachyarrhythmias, acute (48 hours)  Code Status: Full Code      Clinically Significant Risk Factors                  # Hypertension: Noted on problem list        # Obesity: Estimated body mass index is 30.42 kg/m  as calculated from the following:    Height as of this encounter: 1.6 m (5' 3\").    Weight as of this encounter: 77.9 kg (171 lb 11.8 oz).             Disposition Plan      Expected Discharge Date: 02/09/2024      Destination: home          Tamime Hart MD  Hospitalist Service  [facility]  Securely message with [employer] (more info)  Text page via [employer] Paging/Directory   ______________________________________________________________________  Interval history:  No acute events overnight. Discussed with  on 02/02 about potential tracheostomy if patient fails to improve.     Unable to to ROS.     Physical Exam   Vital Signs: Temp: 98  F (36.7  C) Temp src: Temporal BP: (!) 116/90 Pulse: (!) 141   Resp: 18 SpO2: 93 % O2 Device: Mechanical " Ventilator    Weight: 171 lbs 11.81 oz    General:  sedated and intubated   Cardiac: Normal rate, regular rhythm  Respiratory: ventilated, prolonged expiratory phase, decreased air movement, diffuse wheezing   GI:  Abdomen soft, nontender, nondistended.  Neurologic: rouses to pain, sedated      Medical Decision Making       50 MINUTES SPENT BY ME on the date of service doing chart review, history, exam, documentation & further activities per the note.      Data     I have personally reviewed the following data over the past 24 hrs:    18.1 (H)  \   12.2   / 167     141 103 29.7 (H) /  177 (H)   4.9 33 (H) 0.55 \

## 2024-02-02 NOTE — PLAN OF CARE
Problem: Enteral Nutrition  Goal: Safe, Effective Therapy Delivery  Outcome: Progressing  Goal: Feeding Tolerance  Outcome: Progressing   Goal Outcome Evaluation: Patient tolerating EN, propofol slightly down, increased TF slightly to better meed needs

## 2024-02-02 NOTE — PROGRESS NOTES
NUTRITION BRIEF NOTE     Chart check for nutrition support patient. Chart reviewed and discussed during IDT rounds. Patient not tolerating weans. May require trach. Propofol now decreased.    Interventions:  Increase TF slightly- Vital HP @ 60 ml/hr x 22 hrs (1320 ml) provides 1320 kcal, 115 g protein, 147 g CHO, 0 g fiber, 1096 ml free H2O    Propofol at current rate provides: 211 kcal/day    Total energy/protein provisions, all sources= 1531 kcal (23 kcal/kg)/115 g protein (1.7 g/kg) per DW of 66.4 kg    Will continue to follow per protocol. Please page/consult as needed.     Danielle Molina RD, LD, [facility]  Pager - 3rd floor/ICU: [phone]  Pager - All other floors: [phone]  Pager - Weekend/holiday: [phone]  Office: [phone]

## 2024-02-02 NOTE — PLAN OF CARE
ICU End of Shift Summary.  For vital signs and complete assessments, please see documentation flowsheets.      Pertinent assessments: Pt intubated and sedated on propofol and fentanyl. Tele afib. Thick secretions via ETT suctioning. VSS. Afebrile. Hines in place with ~50ml/hr UOP. TF running at goal with FWF. Colostomy with small stool output.      Major Shift Events:     -Pt tolerated PS for less than 5 min this AM, stopped d/t pt being apneic     Plan (Upcoming Events): tbd  Discharge/Transfer Needs: tbd     Bedside Shift Report Completed : yes  Bedside Safety Check Completed: yes      Right wrist and Left wrist restraints continued 2/2/2024    Clinical Justification: Pulling lines, pulling tubes, and pulling equipment  Less Restrictive Alternative: Repositioning, De-escalation, Reorientation  Attending Physician Notified: MD ordered restraint,     New orders placed Yes  Length of Order: 1 Day      Santa De La Torre RN

## 2024-02-02 NOTE — PROGRESS NOTES
ICU Attending Note    I have seen and examined Lara Melendez, reviewed the patient's history, pertinent labs, vital signs, medications, physical exam, and radiographs.  The patient is critically ill by my examination and requires continued ICU monitoring and cares    Lara Melendez is admitted to ICU with a COPD exacerbation.  Has had multiple recent admissions for the same.  Recently completed a steroid taper.    Recent Events:  Remains agitated when vent is weaned.    HR increased with weans.  Fluid up    Exam:  Temp:  [97  F (36.1  C)-98.2  F (36.8  C)] 97  F (36.1  C)  Pulse:  [] 121  Resp:  [9-20] 14  BP: ()/() 118/79  FiO2 (%):  [25 %-30 %] 25 %  SpO2:  [91 %-100 %] 91 %  @I/O last 3 completed shifts:  In: 4901.07 [I.V.:2941.07; NG/GT:860]  Out: 1550 [Urine:1500; Stool:50]  Vent Mode: CMV/AC  (Continuous Mandatory Ventilation/ Assist Control)  FiO2 (%): 25 %  Resp Rate (Set): 15 breaths/min  Tidal Volume (Set, mL): 350 mL  PEEP (cm H2O): 10 cmH2O  Pressure Support (cm H2O): 10 cmH2O  Resp: 14    Lungs course today, scattered wheezes  Heart rrr, distant sounds  Abd ostomy bag, nontender  Ext warm, perfused    Results:  ABG   Recent Labs   Lab 01/28/24  0552   PH 7.35  7.35   PCO2 62*  62*   PO2 62*  58*   HCO3 34*  34*     CBC  Recent Labs   Lab 01/31/24  0926 01/28/24  0405   WBC 8.6 21.5*   HGB 12.3 15.4   HCT 39.2 47.8*    268     BMP  Recent Labs   Lab 02/02/24  0827 02/02/24  0447 02/02/24  0406 02/01/24  2359 01/31/24  0759 01/31/24  0517 01/29/24  0817 01/29/24  0422 01/28/24  0800 01/28/24  0405   NA  --  141  --   --   --  143  --  139  --  141   POTASSIUM  --  4.9  --   --   --  4.9  --  4.5  --  3.9   CHLORIDE  --  103  --   --   --  107  --  102  --  98   CO2  --  33*  --   --   --  33*  --  33*  --  29   BUN  --  29.7*  --   --   --  32.6*  --  44.1*  --  36.0*   CR  --  0.55  --   --   --  0.60  --  1.01*  --  0.79   * 166* 185* 182*   < > 172*   < > 150*   <  > 182*    < > = values in this interval not displayed.     LFT  No lab results found in last 7 days.    PancreasNo lab results found in last 7 days.  INR  Lab Results   Component Value Date    INR 1.04 06/21/2023    INR 0.97 03/26/2023    INR 0.91 10/26/2022    INR 1.04 09/27/2022       Current Issues:    Acute hypoxic and hypercarbic respiratory failure: continue steroids, repeat furosemide, opiates for air hunger.  Ongoing vent wean. Looks like tracheostomy will be required. COPD exacerbation , treated.  Reduce fluids.  Protein calorie malnutrition: based on exam, limited intake, and work of breathing for severe COPD.  Tube feeds.   Atrial fibrillation: on amio, need a long term plan.  Lopressor. DOAC.    Discussed course of events with  yesterday.  Explained probable need for tracheostomy again.    Evaluation and management time exclusive of procedures was 30 minutes critical care time including:  examination with the ICU team, discussion of the patient's condition with other physicians and members of the care team, reviewing all data related to the patient, and time utilizing the EMR for documentation of this patient's care.    Ross Sumner MD  Acute Care Surgery/Critical Care

## 2024-02-02 NOTE — PLAN OF CARE
Shift Events: sedation decreased but HR increased from 90s to 130s - MD aware    Neuro: sedated to RASS -1/-2, follows commands, moves all extremities  CV: AF with rate 120-140s, mild HTN  ID: afebrile  Pulm: LS coarse with exp wheeze, creamy secretions  GI: TF at goal, BMs WNL via colostomy  : good UO per el  Lines/gtts: PICC infusing prop and fent, all PIVs SL  Skin: intact, WOC following for ostomy  Labs: unremarkable    For vital signs and complete assessments, see documentation flowsheets.     Plan: possibly proceeding with trach next week, continue to attempt SBTs    Right wrist, Left wrist, and soft restraints restraints continued 2/2/2024    Clinical Justification: Pulling lines, pulling tubes, and pulling equipment  Less Restrictive Alternative: Repositioning, Pain management, Reorientation  Attending Physician Notified: MD ordered restraint,     New orders placed Yes  Length of Order: 1 Day    Jodi Edward RN     Notified provider about indwelling el catheter discussed removal or continued need.    Did provider choose to remove indwelling el catheter? NO    Provider's el indication for keeping indwelling el catheter: Indication for continued use: Strict 1-2 Hour I & O if external catheters are not an option    Is there an order for indwelling el catheter? YES    *If there is a plan to keep el catheter in place at discharge daily notification with provider is not necessary, but please add a notation in the treatment team sticky note that the patient will be discharging with the catheter.

## 2024-02-02 NOTE — PROGRESS NOTES
[facility] ICU VENTILATOR RESPIRATORY NOTE     Date of Admission: 1/22/24  Date of Intubation (most recent): 1/28/24  Reason for Mechanical Ventilation: respiratory failure  Number of Days on Mechanical Ventilation: 6     Vent Mode: CMV/AC  (Continuous Mandatory Ventilation/ Assist Control)  FiO2 (%): 30 %  Resp Rate (Set): 15 breaths/min  Tidal Volume (Set, mL): 350 mL  PEEP (cm H2O): 5 cmH2O  Resp: 15     Patient received Duoneb QID. 7.0 ETT secure 25 @ teeth/gum     ETT appearance on chest x-ray:  . Stable position of endotracheal tube.      Placed pt on weaning trial CPAP +10 PS10 , 30% starting at 0528 and ended at 0532 for a total of 4 minutes. Pt's RR was <6. Pt also went Apneic for 60 seconds.    Gabby Celis, RT

## 2024-02-02 NOTE — PROGRESS NOTES
CLINICAL NUTRITION SERVICES - REASSESSMENT NOTE     Nutrition Prescription    Malnutrition Status:    % Intake: Decreased intake does not meet criteria  % Weight Loss: None noted  Subcutaneous Fat Loss: None observed  Muscle Loss: None observed  Fluid Accumulation/Edema: None noted  Malnutrition Diagnosis: Patient does not meet two of the established criteria necessary for diagnosing malnutrition     Recommendations already ordered by Registered Dietitian (RD):  Enteral Nutrition - Modify rate - Increase to 60 ml/hr x 22 hrs (1320 ml/day), which provides 1320 kcals, 115 g PRO, 1103 ml free H20, 146 g CHO, and 0 g fiber daily.    Propofol at current rate provides 211 kcal/day    Total energy/protein provisions, all sources = 1531 kcal (23 kcal/kg)/115 g protein (1.7 g/kg)    Future/Additional Recommendations:  Monitor intake, tolerance, propofol, labs--adjust EN prn     EVALUATION OF THE PROGRESS TOWARD GOALS     Nutrition Support:    Access: OGT  Formula/Rate/Provisions: Vital High Protein @ 50 mL/hr x 22 hours = 1100 ml (hold 1 hour before and 1 hour after synthroid administration), which provides 1100 kcals, 95 g PRO, 919 ml free H20, 122 g CHO, and 0 g fiber daily.   Flushes: 60 ml q 4 hrs    Propofol at current rate provides 211 kcal/day    Total energy/protein provisions: 1311 kcal/95 g protein    Intake/Tolerance:  ongoing adjustments for propofol, meeting needs     NEW FINDINGS   General: Patient discussed during IDT rounds. Unsuccessful at weaning from vent. Possible need for trach. Currently tolerating EN well.    Weight: Pt significantly fluid up, LR stopped today  Date/Time Weight Weight Method   02/02/24 0300 77.9 kg (171 lb 11.8 oz) Bed scale   02/01/24 0415 74.4 kg (164 lb 0.4 oz) Bed scale   01/31/24 0345 70.9 kg (156 lb 4.9 oz) Bed scale   01/30/24 0357 66.8 kg (147 lb 4.3 oz) --   01/29/24 0430 66.4 kg (146 lb 6.2 oz) Bed scale   01/27/24 0600 67 kg (147 lb 11.3 oz) Bed scale   01/22/24 1833 67 kg  (147 lb 9.6 oz) Standing scale     Labs:   - BUN 29.7  Recent Labs   Lab 02/02/24  1214 02/02/24  0827 02/02/24  0447 02/02/24  0406 02/01/24  2359 02/01/24  1949   * 154* 166* 185* 182* 161*     GI: gastrostomy tube- 75 yesterday, 135 on 1/31    Skin: no concerns noted, WOC assessed ostomy    Meds:   - Lipitor  - Lasix  - levothyroxine  - methylprednisolone  - mvit/M  - pantoprazole   dextrose      fentaNYL 100 mcg/hr (02/02/24 1300)    propofol 20 mcg/kg/min (02/02/24 1300)    And    - MEDICATION INSTRUCTIONS -      - MEDICATION INSTRUCTIONS -      - MEDICATION INSTRUCTIONS -      - MEDICATION INSTRUCTIONS -          ASSESSED NUTRITION NEEDS   Dosing Weight: 66.4 kg, lowest documented wt this admission   Estimated Energy Needs: 4403-1937 kcals/day (20 - 25 kcals/kg)   Justification: Vented   Estimated Protein Needs:  grams protein/day (1.2 - 2 grams of pro/kg)   Justification: Hypercatabolism with critical illness   Estimated Fluid Needs: Per provider pending fluid statu     MALNUTRITION  As noted above    Previous Goals   Total avg nutritional intake to meet a minimum of 1192 kcal/kg and 80 g PRO/kg daily (per dosing wt 67 kg).   Evaluation: Met    Previous Nutrition Diagnosis  Inadequate protein energy intake related to inadequate oral intake secondary to respiratory failure as evidenced by intubation/sedation   Evaluation: No change    CURRENT NUTRITION DIAGNOSIS  Inadequate oral intake related to respiratory needs necessitating NPO status as evidenced by need for enteral nutrition to meet needs      INTERVENTIONS  Implementation  Enteral Nutrition - Modify rate - Increase to 60 ml/hr x 22 hrs (1320 ml/day), which provides 1320 kcals, 115 g PRO, 1103 ml free H20, 146 g CHO, and 0 g fiber daily.    Propofol at current rate provides 211 kcal/day    Total energy/protein provisions, all sources = 1531 kcal (23 kcal/kg)/115 g protein (1.7 g/kg)    Collaboration and Referral of Nutrition care: Patient  discussed this morning during IDT rounds    Goals  Total avg nutritional intake to meet a minimum of 20 kcal/kg and 1.2 g PRO/kg daily (per dosing wt 66.4 kg).    Monitoring/Evaluation  Progress toward goals will be monitored and evaluated per protocol.    Danielle Molina RD, LD, [facility]  Pager - 3rd floor/ICU: [phone]  Pager - All other floors: [phone]  Pager - Weekend/holiday: [phone]  Office: [phone]

## 2024-02-02 NOTE — PROGRESS NOTES
[facility] ICU VENTILATOR RESPIRATORY NOTE  Date of Admission: 1/26/24  Date of Intubation (most recent):1/28/24   Reason for Mechanical Ventilation: Respiratory Failure  Number of Days on Mechanical Ventilation: 3  Met Criteria for Pressure Support Trial:No pt on PEEP +10  ETT appearance on chest x-ray: In appropriate place      Respiratory Therapy  Patient remains intubated on mechanical ventilator with the following settings:    Vent Mode: CMV/AC  (Continuous Mandatory Ventilation/ Assist Control)  FiO2 (%): 25 %  Resp Rate (Set): 15 breaths/min  Tidal Volume (Set, mL): 350 mL  PEEP (cm H2O): 10 cmH2O  Pressure Support (cm H2O): 10 cmH2O  Resp: 18    Temp: 98  F (36.7  C) Temp src: Temporal BP: (!) 116/90 Pulse: (!) 141   Resp: 18 SpO2: 93 % O2 Device: Mechanical Ventilator      BS were coarse. Suctioned small amounts of creamy thick secretions through the ETT.  Will continue to follow and assess the patient's respiratory needs.    Cortney Martin, RT  2/2/2024 5:05 PM

## 2024-02-02 NOTE — PLAN OF CARE
Goal Outcome Evaluation:      Plan of Care Reviewed With: patient, spouse, child    Overall Patient Progress: no changeOverall Patient Progress: no change     ICU End of Shift Summary.  For vital signs and complete assessments, please see documentation flowsheets.     Pertinent assessments: RASS goal -1 to -2. LS coarse/diminished/wheezes, BS audible - colostomy intact. El in place. Tele - Afib VVR  Major Shift Events:   - Colostomy cares completed by wound care  - Wean trial attempted twice, failed  Plan (Upcoming Events): Continue plan of care  Discharge/Transfer Needs: TBD    Bedside Shift Report Completed : Y  Bedside Safety Check Completed: Y    Notified provider about indwelling el catheter discussed removal or continued need.    Did provider choose to remove indwelling el catheter? NO    Provider's el indication for keeping indwelling el catheter: Indication for continued use: Strict 1-2 Hour I & O if external catheters are not an option    Is there an order for indwelling el catheter? YES    *If there is a plan to keep el catheter in place at discharge daily notification with provider is not necessary, but please add a notation in the treatment team sticky note that the patient will be discharging with the catheter.

## 2024-02-03 LAB
ANION GAP SERPL CALCULATED.3IONS-SCNC: 8 MMOL/L (ref 7–15)
BUN SERPL-MCNC: 36.8 MG/DL (ref 8–23)
CALCIUM SERPL-MCNC: 7.8 MG/DL (ref 8.8–10.2)
CHLORIDE SERPL-SCNC: 101 MMOL/L (ref 98–107)
CREAT SERPL-MCNC: 0.62 MG/DL (ref 0.51–0.95)
DEPRECATED HCO3 PLAS-SCNC: 32 MMOL/L (ref 22–29)
EGFRCR SERPLBLD CKD-EPI 2021: >90 ML/MIN/1.73M2
GLUCOSE BLDC GLUCOMTR-MCNC: 107 MG/DL (ref 70–99)
GLUCOSE BLDC GLUCOMTR-MCNC: 121 MG/DL (ref 70–99)
GLUCOSE BLDC GLUCOMTR-MCNC: 127 MG/DL (ref 70–99)
GLUCOSE BLDC GLUCOMTR-MCNC: 149 MG/DL (ref 70–99)
GLUCOSE BLDC GLUCOMTR-MCNC: 164 MG/DL (ref 70–99)
GLUCOSE SERPL-MCNC: 133 MG/DL (ref 70–99)
GLUCOSE SERPL-MCNC: 140 MG/DL (ref 70–99)
MAGNESIUM SERPL-MCNC: 1.9 MG/DL (ref 1.7–2.3)
PHOSPHATE SERPL-MCNC: 2.6 MG/DL (ref 2.5–4.5)
POTASSIUM SERPL-SCNC: 3.9 MMOL/L (ref 3.4–5.3)
SODIUM SERPL-SCNC: 141 MMOL/L (ref 135–145)

## 2024-02-03 PROCEDURE — 200N000001 HC R&B ICU

## 2024-02-03 PROCEDURE — 99291 CRITICAL CARE FIRST HOUR: CPT | Performed by: SURGERY

## 2024-02-03 PROCEDURE — 999N000253 HC STATISTIC WEANING TRIALS

## 2024-02-03 PROCEDURE — 94003 VENT MGMT INPAT SUBQ DAY: CPT

## 2024-02-03 PROCEDURE — 80048 BASIC METABOLIC PNL TOTAL CA: CPT | Performed by: INTERNAL MEDICINE

## 2024-02-03 PROCEDURE — 84100 ASSAY OF PHOSPHORUS: CPT | Performed by: STUDENT IN AN ORGANIZED HEALTH CARE EDUCATION/TRAINING PROGRAM

## 2024-02-03 PROCEDURE — 250N000011 HC RX IP 250 OP 636: Performed by: STUDENT IN AN ORGANIZED HEALTH CARE EDUCATION/TRAINING PROGRAM

## 2024-02-03 PROCEDURE — 94640 AIRWAY INHALATION TREATMENT: CPT | Mod: 76

## 2024-02-03 PROCEDURE — 80048 BASIC METABOLIC PNL TOTAL CA: CPT | Performed by: STUDENT IN AN ORGANIZED HEALTH CARE EDUCATION/TRAINING PROGRAM

## 2024-02-03 PROCEDURE — 250N000013 HC RX MED GY IP 250 OP 250 PS 637: Performed by: ANESTHESIOLOGY

## 2024-02-03 PROCEDURE — 250N000013 HC RX MED GY IP 250 OP 250 PS 637: Performed by: STUDENT IN AN ORGANIZED HEALTH CARE EDUCATION/TRAINING PROGRAM

## 2024-02-03 PROCEDURE — 99233 SBSQ HOSP IP/OBS HIGH 50: CPT | Performed by: STUDENT IN AN ORGANIZED HEALTH CARE EDUCATION/TRAINING PROGRAM

## 2024-02-03 PROCEDURE — C9113 INJ PANTOPRAZOLE SODIUM, VIA: HCPCS | Performed by: INTERNAL MEDICINE

## 2024-02-03 PROCEDURE — 250N000011 HC RX IP 250 OP 636: Performed by: INTERNAL MEDICINE

## 2024-02-03 PROCEDURE — 83735 ASSAY OF MAGNESIUM: CPT | Performed by: HOSPITALIST

## 2024-02-03 PROCEDURE — 250N000009 HC RX 250: Performed by: STUDENT IN AN ORGANIZED HEALTH CARE EDUCATION/TRAINING PROGRAM

## 2024-02-03 PROCEDURE — 250N000013 HC RX MED GY IP 250 OP 250 PS 637: Performed by: INTERNAL MEDICINE

## 2024-02-03 PROCEDURE — 999N000157 HC STATISTIC RCP TIME EA 10 MIN

## 2024-02-03 RX ORDER — FUROSEMIDE 10 MG/ML
20 INJECTION INTRAMUSCULAR; INTRAVENOUS EVERY 6 HOURS
Status: COMPLETED | OUTPATIENT
Start: 2024-02-03 | End: 2024-02-03

## 2024-02-03 RX ORDER — FUROSEMIDE 10 MG/ML
20 INJECTION INTRAMUSCULAR; INTRAVENOUS 2 TIMES DAILY
Status: DISCONTINUED | OUTPATIENT
Start: 2024-02-04 | End: 2024-02-07

## 2024-02-03 RX ADMIN — FUROSEMIDE 20 MG: 10 INJECTION, SOLUTION INTRAMUSCULAR; INTRAVENOUS at 13:20

## 2024-02-03 RX ADMIN — LEVOTHYROXINE SODIUM 112 MCG: 0.11 TABLET ORAL at 08:03

## 2024-02-03 RX ADMIN — GUAIFENESIN 200 MG: 200 SOLUTION ORAL at 08:03

## 2024-02-03 RX ADMIN — AMIODARONE HYDROCHLORIDE 200 MG: 200 TABLET ORAL at 08:03

## 2024-02-03 RX ADMIN — PAROXETINE HYDROCHLORIDE 20 MG: 20 TABLET, FILM COATED ORAL at 08:03

## 2024-02-03 RX ADMIN — IPRATROPIUM BROMIDE AND ALBUTEROL SULFATE 3 ML: .5; 3 SOLUTION RESPIRATORY (INHALATION) at 07:36

## 2024-02-03 RX ADMIN — GUAIFENESIN 200 MG: 200 SOLUTION ORAL at 00:04

## 2024-02-03 RX ADMIN — METOPROLOL TARTRATE 25 MG: 25 TABLET, FILM COATED ORAL at 20:35

## 2024-02-03 RX ADMIN — GUAIFENESIN 200 MG: 200 SOLUTION ORAL at 11:39

## 2024-02-03 RX ADMIN — APIXABAN 5 MG: 5 TABLET, FILM COATED ORAL at 20:35

## 2024-02-03 RX ADMIN — CLONAZEPAM 0.5 MG: 0.5 TABLET ORAL at 08:03

## 2024-02-03 RX ADMIN — PANTOPRAZOLE SODIUM 40 MG: 40 INJECTION, POWDER, FOR SOLUTION INTRAVENOUS at 08:03

## 2024-02-03 RX ADMIN — FUROSEMIDE 20 MG: 10 INJECTION, SOLUTION INTRAMUSCULAR; INTRAVENOUS at 19:38

## 2024-02-03 RX ADMIN — APIXABAN 5 MG: 5 TABLET, FILM COATED ORAL at 08:03

## 2024-02-03 RX ADMIN — IPRATROPIUM BROMIDE AND ALBUTEROL SULFATE 3 ML: .5; 3 SOLUTION RESPIRATORY (INHALATION) at 19:41

## 2024-02-03 RX ADMIN — GUAIFENESIN 200 MG: 200 SOLUTION ORAL at 19:38

## 2024-02-03 RX ADMIN — PROPOFOL 30 MCG/KG/MIN: 10 INJECTION, EMULSION INTRAVENOUS at 20:34

## 2024-02-03 RX ADMIN — Medication 15 ML: at 08:03

## 2024-02-03 RX ADMIN — PROPOFOL 30 MCG/KG/MIN: 10 INJECTION, EMULSION INTRAVENOUS at 14:52

## 2024-02-03 RX ADMIN — GUAIFENESIN 200 MG: 200 SOLUTION ORAL at 16:37

## 2024-02-03 RX ADMIN — IPRATROPIUM BROMIDE AND ALBUTEROL SULFATE 3 ML: .5; 3 SOLUTION RESPIRATORY (INHALATION) at 11:26

## 2024-02-03 RX ADMIN — IPRATROPIUM BROMIDE AND ALBUTEROL SULFATE 3 ML: .5; 3 SOLUTION RESPIRATORY (INHALATION) at 15:59

## 2024-02-03 RX ADMIN — ATORVASTATIN CALCIUM 20 MG: 20 TABLET, FILM COATED ORAL at 08:03

## 2024-02-03 RX ADMIN — METOPROLOL TARTRATE 25 MG: 25 TABLET, FILM COATED ORAL at 08:03

## 2024-02-03 RX ADMIN — GUAIFENESIN 200 MG: 200 SOLUTION ORAL at 04:41

## 2024-02-03 RX ADMIN — PROPOFOL 25 MCG/KG/MIN: 10 INJECTION, EMULSION INTRAVENOUS at 05:21

## 2024-02-03 ASSESSMENT — ACTIVITIES OF DAILY LIVING (ADL)
ADLS_ACUITY_SCORE: 36
ADLS_ACUITY_SCORE: 34
ADLS_ACUITY_SCORE: 34
ADLS_ACUITY_SCORE: 36
ADLS_ACUITY_SCORE: 34
ADLS_ACUITY_SCORE: 34
ADLS_ACUITY_SCORE: 36
ADLS_ACUITY_SCORE: 34
ADLS_ACUITY_SCORE: 34
ADLS_ACUITY_SCORE: 36

## 2024-02-03 NOTE — PROCEDURES
Informed consent was obtained yesterday from the patient's .  Time out was performed.  An abdominal paracentesis was performed under sterile conditions under ultrasound guidance in the RLQ.  1.9 L of straw colored fluid was removed.  The patient tolerated the procedure well.  There were no complications.

## 2024-02-03 NOTE — PROGRESS NOTES
[facility]    Medicine Progress Note - Hospitalist Service    Date of Admission:  1/22/2024    Assessment & Plan     Acute hypoxemic hypercarbic respiratory failure 2/2 severe COPD Exacerbation  Severe COPD  History of intubation with tracheostomy  Presented with COPD exacerbation; has had frequent exacerbations requiring admission, with this being her third admission in 3 weeks. She presented again with COPD exacerbation. she continued to experience air hunger and a feeling of anxiety/distress, despite seemingly adequate oxygenation and stable gases. She had received continuous nebs, nebs scheduled q2h, steroids, azithromycin, and benzos without relief. She went into AF with RVR  1/28 and was intubated for dyspnea, tachypnea. No significant improvement since intubation. Appears at least 10 L up since admission .     -On IV fentanyl and propofol, wean as able  -PTA meds include breo and prn duonebs, no maintenance anticholinergic and not on any meds to reduce exacerbations.   -Pulmonary consulted, appreciate recommendations  -completed a 6 day course of IV methylprednisolone  -continue gentle diuresis IV lasix 20 mg BID  -continue duonebs QID  -continue sedation and analgesia, pantoprazole   -vent management per crit care     Plan upon discharge per pulmonology:  consider adding chronic azithromycin or Roflumilast at discharge to reduce exacerbations  consider steroid nebs rather than inhaler at discharge to increase pulmonary steroid delivery  consider evaluation for inflammatory/immune trigger (eosinophils, IgE) as an outpatient when off prednisone  follow up with pulmonology at discharge  no obvious trigger for current admission, her recent admissions have had symptoms that respond to prednisone with return of symptoms when prednisone complete; ? If she would be a patient that would consider chronic prednisone use if fails other treatment  would benefit from outpatient palliative support  "for symptomatic management    Afib with RVR  On both rate and rhythm control.  On DOAC as well.  -Continue amiodarone 200 mg daily, need alternative agent as amiodarone not a good option in the long run given significant underlying COPD  -continue Lopressor 25 mg twice daily       Diet: Combination Diet Regular Diet Adult  Adult Formula Drip Feeding: Continuous Vital High Protein; Orogastric tube; Goal Rate: 60; mL/hr; x22 hours (hold 1 hour before and 1 hour after synthroid administration)    DVT Prophylaxis: DOAC  Hines Catheter: PRESENT, indication: Strict 1-2 Hour I&O  Lines: PRESENT      PICC 01/28/24 Triple Lumen Left Brachial vein lateral-Site Assessment: WDL    Cardiac Monitoring: ACTIVE order. Indication: Tachyarrhythmias, acute (48 hours)  Code Status: Full Code      Clinically Significant Risk Factors                  # Hypertension: Noted on problem list        # Obesity: Estimated body mass index is 30.11 kg/m  as calculated from the following:    Height as of this encounter: 1.6 m (5' 3\").    Weight as of this encounter: 77.1 kg (170 lb).             Disposition Plan   Pending clinical improvement. Anticipate 2-3 days     Tammie Hart MD  Hospitalist Service  [facility]  Securely message with [employer] (more info)  Text page via [employer] Paging/Directory   ______________________________________________________________________  Interval history:  No acute events overnight.Patient was awake this morning and followed command but unable to answer yes/no questions.       Unable to to ROS.     Physical Exam   Vital Signs: Temp: 97.3  F (36.3  C) Temp src: Temporal BP: 91/63 Pulse: 86   Resp: 15 SpO2: 96 % O2 Device: Mechanical Ventilator    Weight: 170 lbs 0 oz    General:  awake, jaleesa, comfortable and intubated  Cardiac: Normal rate, regular rhythm  Respiratory: ventilated, prolonged expiratory phase, decreased air movement  GI:  Abdomen soft, nontender, nondistended.  Neurologic: spontaneous " eye opening.       Medical Decision Making       51 MINUTES SPENT BY ME on the date of service doing chart review, history, exam, documentation & further activities per the note.      Data     I have personally reviewed the following data over the past 24 hrs:    N/A  \   N/A   / N/A     141 101 36.8 (H) /  127 (H)   3.9 32 (H) 0.62 \

## 2024-02-03 NOTE — PROGRESS NOTES
[facility] hospital ICU VENTILATOR RESPIRATORY NOTE    Date of Admission: 1/26/24    Date of Intubation (most recent): 1/28/24    Reason for Mechanical Ventilation: Respiratory Failure    Number of Days on Mechanical Ventilation: 7    Met Criteria for Pressure Support Trial: No    Reason for No Pressure Support Trial: PEEP +10    Significant Events Today: No    ABG Results:   Recent Labs   Lab 01/31/24  1659 01/29/24  2205 01/28/24  0552 01/28/24  0405   PH  --   --  7.35  7.35  --    PCO2  --   --  62*  62*  --    PO2  --   --  62*  58*  --    HCO3  --   --  34*  34*  --    O2PER 30 35 30  30 35       ETT appearance on chest x-ray: Endotracheal tube tip 3.5 cm above the stevie.     Plan: Continue to assess and monitor.    Vent Mode: CMV/AC  (Continuous Mandatory Ventilation/ Assist Control)  FiO2 (%): 25 %  Resp Rate (Set): 15 breaths/min  Tidal Volume (Set, mL): 350 mL  PEEP (cm H2O): 10 cmH2O  Pressure Support (cm H2O): 10 cmH2O  Resp: 17    Bs coarse/diminished. Suctioned for small creamy thick secretions. Will continue to assess and monitor.    Renny Gamboa RT on 2/3/2024 at 3:46 AM

## 2024-02-03 NOTE — PLAN OF CARE
Continues on full vent support. 2 weans today on 12/10. Tele Afib CVR/RVR. Afebrile. Fent reduced to 50. Prop 30. Pt following basic commands - squeezing hands, wiggling toes. TF at goal. Ostomy emptied x2. Great UOP in El after lasix. Updated family at bedside - who stated they would be okay with trach if needed. MDs aware. See Flowsheet Charting.     Goal Outcome Evaluation:      Plan of Care Reviewed With: patient, family    Overall Patient Progress: no changeOverall Patient Progress: no change    Outcome Evaluation: 2 sucessful weans today    Right wrist and Left wrist restraints discontinued at 4:47 PM on 2/3/2024.    Restraint discontinue criteria met, patient is calm, cooperative and safe. Restraints removed.     Patient's Response: Needs reinforcement  Family Notification: Spouse/significant other  Attending Physician Notified: MD ordered restraint,      Mary Jane Ingram RN    Notified provider about indwelling el catheter discussed removal or continued need.    Did provider choose to remove indwelling el catheter? NO    Provider's el indication for keeping indwelling el catheter: Indication for continued use: Strict 1-2 Hour I & O if external catheters are not an option    Is there an order for indwelling el catheter? YES    *If there is a plan to keep el catheter in place at discharge daily notification with provider is not necessary, but please add a notation in the treatment team sticky note that the patient will be discharging with the catheter.

## 2024-02-03 NOTE — PROGRESS NOTES
CROSS COVER    Paged due to persistent area of erythema on LLE and pt feeling like something is in her eye.  Pt seen and examined.  States she feels like something is in the medial portion of her L eye.  Eye exam attempted, though pt somewhat uncooperative with keeping eye open.  Bilateral conjunctive quite red and pt with bilateral purulent eye drainage.  Attempted eye flush with normal saline without improvement.  Start ciprofloxacin eye drops QID for 5 days for bilateral conjunctivitis.  Give additional dose of zyrtec 5 mg as pt reports it seemed to help earlier.  Consider ED provider evaluation for fundoscopic exam in AM if symptoms continue or worsen.

## 2024-02-03 NOTE — PROGRESS NOTES
ICU Attending Note    I have seen and examined Lara Melendez, reviewed the patient's history, pertinent labs, vital signs, medications, physical exam, and radiographs.  The patient is critically ill by my examination and requires continued ICU monitoring and cares    Lara Melendez is admitted to ICU with a COPD exacerbation.  Has had multiple recent admissions for the same.  Recently completed a steroid taper.    Recent Events:  Wean on PS 10 PEEP 10 going well.  Pt with large Vt and prolonged expiratory time but not anxious or fighting the wean as in the past.  PS10 is significant support though with her low resp rate.  Fluid negative last days with diuresis  Exam:  Temp:  [97.1  F (36.2  C)-98.5  F (36.9  C)] 97.7  F (36.5  C)  Pulse:  [] 77  Resp:  [6-18] 9  BP: ()/() 119/79  FiO2 (%):  [25 %] 25 %  SpO2:  [84 %-100 %] 100 %  @I/O last 3 completed shifts:  In: 2730.9 [I.V.:700.9; NG/GT:660]  Out: 3595 [Urine:3395; Stool:200]  Vent Mode: CPAP/PS  (Continuous positive airway pressure with Pressure Support)  FiO2 (%): 25 %  Resp Rate (Set): 15 breaths/min  Tidal Volume (Set, mL): 350 mL  PEEP (cm H2O): 10 cmH2O  Pressure Support (cm H2O): 10 cmH2O  Resp: (!) 9    Lungs course today, scattered wheezes  Heart rrr, distant sounds  Abd ostomy bag, nontender  Ext warm, perfused    Results:  ABG   Recent Labs   Lab 01/28/24  0552   PH 7.35  7.35   PCO2 62*  62*   PO2 62*  58*   HCO3 34*  34*     CBC  Recent Labs   Lab 02/02/24  1245 01/31/24  0926 01/28/24  0405   WBC 18.1* 8.6 21.5*   HGB 12.2 12.3 15.4   HCT 37.6 39.2 47.8*    166 268     BMP  Recent Labs   Lab 02/03/24  0758 02/03/24  0438 02/03/24  0005 02/02/24 2009 02/02/24  0827 02/02/24  0447 01/31/24  0759 01/31/24  0517 01/29/24  0817 01/29/24  0422   NA  --  141  --   --   --  141  --  143  --  139   POTASSIUM  --  3.9  --   --   --  4.9  --  4.9  --  4.5   CHLORIDE  --  101  --   --   --  103  --  107  --  102   CO2  --  32*   --   --   --  33*  --  33*  --  33*   BUN  --  36.8*  --   --   --  29.7*  --  32.6*  --  44.1*   CR  --  0.62  --   --   --  0.55  --  0.60  --  1.01*   * 133*  140* 149* 167*   < > 166*   < > 172*   < > 150*    < > = values in this interval not displayed.     LFT  No lab results found in last 7 days.    PancreasNo lab results found in last 7 days.  INR  Lab Results   Component Value Date    INR 1.04 06/21/2023    INR 0.97 03/26/2023    INR 0.91 10/26/2022    INR 1.04 09/27/2022       Current Issues:    Acute hypoxic and hypercarbic respiratory failure: continue steroids, repeat furosemide, opiates for air hunger.  Ongoing vent wean. Looks like tracheostomy will be required. COPD exacerbation , treated.  Reduce fluids. Continue diuretic.  Protein calorie malnutrition: based on exam, limited intake, and work of breathing for severe COPD.  Tube feeds.   Atrial fibrillation: on amio, need a long term plan.  Lopressor. DOAC.  Fluid overload.    Discussed course of events with  yesterday.  Explained probable need for tracheostomy again.    Evaluation and management time exclusive of procedures was 30 minutes critical care time including:  examination with the ICU team, discussion of the patient's condition with other physicians and members of the care team, reviewing all data related to the patient, and time utilizing the EMR for documentation of this patient's care.    Ross Sumner MD  Acute Care Surgery/Critical Care

## 2024-02-03 NOTE — PROCEDURES
A formal time out was performed.  A right radial arterial line was placed under sterile conditions using ultrasound guidance.   The catheter was sutured in place and a sterile dressing applied.

## 2024-02-03 NOTE — PLAN OF CARE
ICU End of Shift Summary.  For vital signs and complete assessments, please see documentation flowsheets.      Neuro: RASS -1 most of night. Occ restless dt c/o ett discomfort  Cardiac: A-Fib CVR  Resp: vented. Small amount of thick secrestions.   GI: TF running at goal. Colostomy  : Hines  Lines: LPICC. X1 PIV  Drips: Fent/Prop    Major Shift Events:   none  Plan (Upcoming Events): try pressure support and wean off prop and fent as able  Discharge/Transfer Needs: tbd     Bedside Shift Report Completed: y   Bedside Safety Check Completed: y         Right wrist and Left wrist restraints continued 2/3/2024    Clinical Justification: Pulling lines, pulling tubes, and pulling equipment  Less Restrictive Alternative: Repositioning, Re-evaluate equipment, Disguise equipment, Pain management, De-escalation, Reorientation  Attending Physician Notified: MD ordered restraint,     New orders placed Yes  Length of Order: 1 Day      Eileen Hussein RN

## 2024-02-04 LAB
ANION GAP SERPL CALCULATED.3IONS-SCNC: 6 MMOL/L (ref 7–15)
BUN SERPL-MCNC: 38.8 MG/DL (ref 8–23)
CALCIUM SERPL-MCNC: 8 MG/DL (ref 8.8–10.2)
CHLORIDE SERPL-SCNC: 98 MMOL/L (ref 98–107)
CREAT SERPL-MCNC: 0.75 MG/DL (ref 0.51–0.95)
DEPRECATED HCO3 PLAS-SCNC: 37 MMOL/L (ref 22–29)
EGFRCR SERPLBLD CKD-EPI 2021: >90 ML/MIN/1.73M2
ERYTHROCYTE [DISTWIDTH] IN BLOOD BY AUTOMATED COUNT: 13.9 % (ref 10–15)
GLUCOSE BLDC GLUCOMTR-MCNC: 107 MG/DL (ref 70–99)
GLUCOSE BLDC GLUCOMTR-MCNC: 113 MG/DL (ref 70–99)
GLUCOSE BLDC GLUCOMTR-MCNC: 114 MG/DL (ref 70–99)
GLUCOSE BLDC GLUCOMTR-MCNC: 115 MG/DL (ref 70–99)
GLUCOSE BLDC GLUCOMTR-MCNC: 120 MG/DL (ref 70–99)
GLUCOSE BLDC GLUCOMTR-MCNC: 121 MG/DL (ref 70–99)
GLUCOSE SERPL-MCNC: 96 MG/DL (ref 70–99)
HCT VFR BLD AUTO: 35.1 % (ref 35–47)
HGB BLD-MCNC: 11.2 G/DL (ref 11.7–15.7)
MAGNESIUM SERPL-MCNC: 1.8 MG/DL (ref 1.7–2.3)
MCH RBC QN AUTO: 32.8 PG (ref 26.5–33)
MCHC RBC AUTO-ENTMCNC: 31.9 G/DL (ref 31.5–36.5)
MCV RBC AUTO: 103 FL (ref 78–100)
PHOSPHATE SERPL-MCNC: 2.2 MG/DL (ref 2.5–4.5)
PLATELET # BLD AUTO: 136 10E3/UL (ref 150–450)
POTASSIUM SERPL-SCNC: 3.5 MMOL/L (ref 3.4–5.3)
RBC # BLD AUTO: 3.41 10E6/UL (ref 3.8–5.2)
SODIUM SERPL-SCNC: 141 MMOL/L (ref 135–145)
WBC # BLD AUTO: 12.1 10E3/UL (ref 4–11)

## 2024-02-04 PROCEDURE — 250N000013 HC RX MED GY IP 250 OP 250 PS 637: Performed by: STUDENT IN AN ORGANIZED HEALTH CARE EDUCATION/TRAINING PROGRAM

## 2024-02-04 PROCEDURE — 84100 ASSAY OF PHOSPHORUS: CPT | Performed by: STUDENT IN AN ORGANIZED HEALTH CARE EDUCATION/TRAINING PROGRAM

## 2024-02-04 PROCEDURE — 999N000157 HC STATISTIC RCP TIME EA 10 MIN

## 2024-02-04 PROCEDURE — 94640 AIRWAY INHALATION TREATMENT: CPT

## 2024-02-04 PROCEDURE — 999N000253 HC STATISTIC WEANING TRIALS

## 2024-02-04 PROCEDURE — 83735 ASSAY OF MAGNESIUM: CPT | Performed by: HOSPITALIST

## 2024-02-04 PROCEDURE — 99291 CRITICAL CARE FIRST HOUR: CPT | Performed by: SURGERY

## 2024-02-04 PROCEDURE — 99233 SBSQ HOSP IP/OBS HIGH 50: CPT | Performed by: STUDENT IN AN ORGANIZED HEALTH CARE EDUCATION/TRAINING PROGRAM

## 2024-02-04 PROCEDURE — 85041 AUTOMATED RBC COUNT: CPT | Performed by: STUDENT IN AN ORGANIZED HEALTH CARE EDUCATION/TRAINING PROGRAM

## 2024-02-04 PROCEDURE — 250N000011 HC RX IP 250 OP 636: Performed by: INTERNAL MEDICINE

## 2024-02-04 PROCEDURE — 94003 VENT MGMT INPAT SUBQ DAY: CPT

## 2024-02-04 PROCEDURE — 250N000009 HC RX 250: Performed by: STUDENT IN AN ORGANIZED HEALTH CARE EDUCATION/TRAINING PROGRAM

## 2024-02-04 PROCEDURE — 94640 AIRWAY INHALATION TREATMENT: CPT | Mod: 76

## 2024-02-04 PROCEDURE — 250N000013 HC RX MED GY IP 250 OP 250 PS 637: Performed by: INTERNAL MEDICINE

## 2024-02-04 PROCEDURE — 80048 BASIC METABOLIC PNL TOTAL CA: CPT | Performed by: STUDENT IN AN ORGANIZED HEALTH CARE EDUCATION/TRAINING PROGRAM

## 2024-02-04 PROCEDURE — 999N000040 HC STATISTIC CONSULT NO CHARGE VASC ACCESS

## 2024-02-04 PROCEDURE — 250N000011 HC RX IP 250 OP 636: Performed by: ANESTHESIOLOGY

## 2024-02-04 PROCEDURE — 200N000001 HC R&B ICU

## 2024-02-04 PROCEDURE — 250N000011 HC RX IP 250 OP 636: Performed by: STUDENT IN AN ORGANIZED HEALTH CARE EDUCATION/TRAINING PROGRAM

## 2024-02-04 PROCEDURE — 250N000013 HC RX MED GY IP 250 OP 250 PS 637: Performed by: ANESTHESIOLOGY

## 2024-02-04 PROCEDURE — C9113 INJ PANTOPRAZOLE SODIUM, VIA: HCPCS | Performed by: INTERNAL MEDICINE

## 2024-02-04 RX ORDER — POTASSIUM CHLORIDE 1.5 G/1.58G
20 POWDER, FOR SOLUTION ORAL ONCE
Qty: 1 PACKET | Refills: 0 | Status: COMPLETED | OUTPATIENT
Start: 2024-02-04 | End: 2024-02-04

## 2024-02-04 RX ORDER — MAGNESIUM SULFATE HEPTAHYDRATE 40 MG/ML
2 INJECTION, SOLUTION INTRAVENOUS ONCE
Qty: 50 ML | Refills: 0 | Status: COMPLETED | OUTPATIENT
Start: 2024-02-04 | End: 2024-02-04

## 2024-02-04 RX ADMIN — FUROSEMIDE 20 MG: 10 INJECTION, SOLUTION INTRAMUSCULAR; INTRAVENOUS at 20:35

## 2024-02-04 RX ADMIN — POTASSIUM CHLORIDE 20 MEQ: 1.5 POWDER, FOR SOLUTION ORAL at 20:35

## 2024-02-04 RX ADMIN — Medication 15 ML: at 08:02

## 2024-02-04 RX ADMIN — Medication 50 MCG/HR: at 05:25

## 2024-02-04 RX ADMIN — IPRATROPIUM BROMIDE AND ALBUTEROL SULFATE 3 ML: .5; 3 SOLUTION RESPIRATORY (INHALATION) at 11:14

## 2024-02-04 RX ADMIN — PANTOPRAZOLE SODIUM 40 MG: 40 INJECTION, POWDER, FOR SOLUTION INTRAVENOUS at 07:52

## 2024-02-04 RX ADMIN — AMIODARONE HYDROCHLORIDE 200 MG: 200 TABLET ORAL at 08:01

## 2024-02-04 RX ADMIN — METOPROLOL TARTRATE 37.5 MG: 25 TABLET, FILM COATED ORAL at 20:35

## 2024-02-04 RX ADMIN — GUAIFENESIN 200 MG: 200 SOLUTION ORAL at 04:05

## 2024-02-04 RX ADMIN — ATORVASTATIN CALCIUM 20 MG: 20 TABLET, FILM COATED ORAL at 08:01

## 2024-02-04 RX ADMIN — GUAIFENESIN 200 MG: 200 SOLUTION ORAL at 00:11

## 2024-02-04 RX ADMIN — GUAIFENESIN 200 MG: 200 SOLUTION ORAL at 07:54

## 2024-02-04 RX ADMIN — GUAIFENESIN 200 MG: 200 SOLUTION ORAL at 12:39

## 2024-02-04 RX ADMIN — FUROSEMIDE 20 MG: 10 INJECTION, SOLUTION INTRAMUSCULAR; INTRAVENOUS at 08:01

## 2024-02-04 RX ADMIN — LEVOTHYROXINE SODIUM 112 MCG: 0.11 TABLET ORAL at 08:01

## 2024-02-04 RX ADMIN — PROPOFOL 20 MCG/KG/MIN: 10 INJECTION, EMULSION INTRAVENOUS at 05:24

## 2024-02-04 RX ADMIN — MAGNESIUM SULFATE HEPTAHYDRATE 2 G: 2 INJECTION, SOLUTION INTRAVENOUS at 20:35

## 2024-02-04 RX ADMIN — APIXABAN 5 MG: 5 TABLET, FILM COATED ORAL at 20:35

## 2024-02-04 RX ADMIN — IPRATROPIUM BROMIDE AND ALBUTEROL SULFATE 3 ML: .5; 3 SOLUTION RESPIRATORY (INHALATION) at 07:41

## 2024-02-04 RX ADMIN — IPRATROPIUM BROMIDE AND ALBUTEROL SULFATE 3 ML: .5; 3 SOLUTION RESPIRATORY (INHALATION) at 19:18

## 2024-02-04 RX ADMIN — GUAIFENESIN 200 MG: 200 SOLUTION ORAL at 20:35

## 2024-02-04 RX ADMIN — METOPROLOL TARTRATE 37.5 MG: 25 TABLET, FILM COATED ORAL at 08:31

## 2024-02-04 RX ADMIN — IPRATROPIUM BROMIDE AND ALBUTEROL SULFATE 3 ML: .5; 3 SOLUTION RESPIRATORY (INHALATION) at 15:59

## 2024-02-04 RX ADMIN — POTASSIUM & SODIUM PHOSPHATES POWDER PACK 280-160-250 MG 1 PACKET: 280-160-250 PACK at 23:58

## 2024-02-04 RX ADMIN — APIXABAN 5 MG: 5 TABLET, FILM COATED ORAL at 08:01

## 2024-02-04 RX ADMIN — GUAIFENESIN 200 MG: 200 SOLUTION ORAL at 23:58

## 2024-02-04 RX ADMIN — CLONAZEPAM 0.5 MG: 0.5 TABLET ORAL at 07:54

## 2024-02-04 RX ADMIN — GUAIFENESIN 200 MG: 200 SOLUTION ORAL at 15:20

## 2024-02-04 RX ADMIN — POTASSIUM & SODIUM PHOSPHATES POWDER PACK 280-160-250 MG 1 PACKET: 280-160-250 PACK at 20:35

## 2024-02-04 RX ADMIN — PROPOFOL 20 MCG/KG/MIN: 10 INJECTION, EMULSION INTRAVENOUS at 15:21

## 2024-02-04 RX ADMIN — PAROXETINE HYDROCHLORIDE 20 MG: 20 TABLET, FILM COATED ORAL at 08:01

## 2024-02-04 ASSESSMENT — ACTIVITIES OF DAILY LIVING (ADL)
ADLS_ACUITY_SCORE: 36
ADLS_ACUITY_SCORE: 34
ADLS_ACUITY_SCORE: 36
ADLS_ACUITY_SCORE: 34
ADLS_ACUITY_SCORE: 36

## 2024-02-04 NOTE — PROGRESS NOTES
ICU Attending Note    I have seen and examined Lara Melendez, reviewed the patient's history, pertinent labs, vital signs, medications, physical exam, and radiographs.  The patient is critically ill by my examination and requires continued ICU monitoring and cares    Lara Melendez is admitted to ICU with a COPD exacerbation.  Has had multiple recent admissions for the same.  Recently completed a steroid taper.    Recent Events:  Tolerated PS yesterday for a while but with PS of 10  This am, didn't do well, even with higher PS.  Fluid negative again yesterday    Exam:  Temp:  [97.2  F (36.2  C)-98.2  F (36.8  C)] 98.2  F (36.8  C)  Pulse:  [] 134  Resp:  [6-31] 31  BP: ()/(57-95) 109/70  FiO2 (%):  [25 %-30 %] 25 %  SpO2:  [88 %-100 %] 93 %  @I/O last 3 completed shifts:  In: 2252.06 [I.V.:292.06; NG/GT:640]  Out: 4020 [Urine:3670; Stool:350]  Vent Mode: CMV/AC  (Continuous Mandatory Ventilation/ Assist Control)  FiO2 (%): 25 %  Resp Rate (Set): 15 breaths/min  Tidal Volume (Set, mL): 350 mL  PEEP (cm H2O): 10 cmH2O  Pressure Support (cm H2O): 10 cmH2O  Resp: (!) 31    Lungs course, scattered wheezes  Heart rrr, distant sounds  Abd ostomy bag, nontender  Ext warm, perfused    Results:  ABG   No lab results found in last 7 days.    CBC  Recent Labs   Lab 02/04/24  0355 02/02/24  1245 01/31/24  0926   WBC 12.1* 18.1* 8.6   HGB 11.2* 12.2 12.3   HCT 35.1 37.6 39.2   * 167 166     BMP  Recent Labs   Lab 02/04/24  0355 02/04/24  0012 02/03/24  1613 02/03/24  1206 02/03/24  0758 02/03/24  0438 02/02/24  0827 02/02/24  0447 01/31/24  0759 01/31/24  0517     --   --   --   --  141  --  141  --  143   POTASSIUM 3.5  --   --   --   --  3.9  --  4.9  --  4.9   CHLORIDE 98  --   --   --   --  101  --  103  --  107   CO2 37*  --   --   --   --  32*  --  33*  --  33*   BUN 38.8*  --   --   --   --  36.8*  --  29.7*  --  32.6*   CR 0.75  --   --   --   --  0.62  --  0.55  --  0.60   GLC 96 120* 107*  127*   < > 133*  140*   < > 166*   < > 172*    < > = values in this interval not displayed.     LFT  No lab results found in last 7 days.    PancreasNo lab results found in last 7 days.  INR  Lab Results   Component Value Date    INR 1.04 06/21/2023    INR 0.97 03/26/2023    INR 0.91 10/26/2022    INR 1.04 09/27/2022       Current Issues:    Acute hypoxic and hypercarbic respiratory failure: Steroids, inhalers, ongoing furosemide.  CO2 is climbing.  May be at limit..  Opiates for air hunger.  Ongoing vent wean, but not consistently tolerating. PEEP is in an effort to help with autoPEEP and not for oxygenation.  I would see if she can tolerate a reasonable PS wean and then trying to reduce PEEP.  I don't think this will happen and tracheostomy will be required.  Protein calorie malnutrition: based on exam, limited intake, and work of breathing for severe COPD.  Tube feeds.   Atrial fibrillation: on amio, need a long term plan.  Lopressor. DOAC.  Fluid overload.    Discussed course of events with daughter yesterday.  Explained probable need for tracheostomy again.    Evaluation and management time exclusive of procedures was 30 minutes critical care time including:  examination with the ICU team, discussion of the patient's condition with other physicians and members of the care team, reviewing all data related to the patient, and time utilizing the EMR for documentation of this patient's care.    Ross Sumner MD  Acute Care Surgery/Critical Care

## 2024-02-04 NOTE — PLAN OF CARE
Continues on full vent support. Tele Afib CVR/RVR. Metoprolol increased, BPs soft. Prop 20, Fent 50. TF at goal. Good output in ostomy with gas. Great UOP with lasix. A2 for cares. Updated family at bedside.  stated he has to work tomorrow but will be available on his cell phone if they need any information or consents. See Flowsheet charting.       Goal Outcome Evaluation:      Plan of Care Reviewed With: patient, family    Overall Patient Progress: improvingOverall Patient Progress: improving    Outcome Evaluation: weaned for 2 hours today at 10/10

## 2024-02-04 NOTE — PROGRESS NOTES
[facility]    Medicine Progress Note - Hospitalist Service    Date of Admission:  1/22/2024    Assessment & Plan   Lara Melendez is a 58 year old female with a past medical history of COPD, atrial fibrillation, chronic anticoagulation with Eliquis, hypothyroidism, hypertension and anxiety with multiple recent admissions for COPD exacerbation, but again for another COPD exacerbation, now intubated.  Regular attempts on weaning off mechanical ventilation, she was noted to have low respiratory rate ranging from 5-10.      Acute hypoxemic hypercarbic respiratory failure 2/2 severe COPD Exacerbation  Severe COPD  History of intubation with tracheostomy  Presented with COPD exacerbation; has had frequent exacerbations requiring admission, with this being her third admission in 3 weeks. She presented again with COPD exacerbation. she continued to experience air hunger and a feeling of anxiety/distress, despite seemingly adequate oxygenation and stable gases. She had received continuous nebs, nebs scheduled q2h, steroids, azithromycin, and benzos without relief. She went into AF with RVR  1/28 and was intubated for dyspnea, tachypnea. No significant improvement since intubation. Appears at least 10 L up since admission .     -On IV fentanyl and propofol, wean as able  -PTA meds include breo and prn duonebs, no maintenance anticholinergic and not on any meds to reduce exacerbations.   -Pulmonary consulted, appreciate recommendations  -completed a 6 day course of IV methylprednisolone  -continue gentle diuresis IV lasix 20 mg BID  -continue duonebs QID  -continue sedation and analgesia, pantoprazole   -vent management per crit care     Plan upon discharge per pulmonology:  consider adding chronic azithromycin or Roflumilast at discharge to reduce exacerbations  consider steroid nebs rather than inhaler at discharge to increase pulmonary steroid delivery  consider evaluation for inflammatory/immune  "trigger (eosinophils, IgE) as an outpatient when off prednisone  follow up with pulmonology at discharge  no obvious trigger for current admission, her recent admissions have had symptoms that respond to prednisone with return of symptoms when prednisone complete; ? If she would be a patient that would consider chronic prednisone use if fails other treatment  would benefit from outpatient palliative support for symptomatic management    Afib with RVR  On both rate and rhythm control.  On DOAC as well.  -Continue amiodarone 200 mg daily, need alternative agent as amiodarone not a good option in the long run given significant underlying COPD  -Heart rate most of the time is still above 110.  Increase metoprolol to 37.5 mg twice daily, with holding parameters.       Diet: Combination Diet Regular Diet Adult  Adult Formula Drip Feeding: Continuous Vital High Protein; Orogastric tube; Goal Rate: 60; mL/hr; x22 hours (hold 1 hour before and 1 hour after synthroid administration)    DVT Prophylaxis: DOAC  Hines Catheter: PRESENT, indication: Strict 1-2 Hour I&O  Lines: PRESENT      PICC 01/28/24 Triple Lumen Left Brachial vein lateral-Site Assessment: WDL    Cardiac Monitoring: ACTIVE order. Indication: Tachyarrhythmias, acute (48 hours)  Code Status: Full Code      Clinically Significant Risk Factors                  # Hypertension: Noted on problem list        # Overweight: Estimated body mass index is 29.67 kg/m  as calculated from the following:    Height as of this encounter: 1.6 m (5' 3\").    Weight as of this encounter: 76 kg (167 lb 8 oz).             Disposition Plan   Pending clinical improvement. Anticipate 2-3 days     Tammie Hart MD  Hospitalist Service  [facility]  Securely message with [employer] (more info)  Text page via [employer] Paging/Directory   ______________________________________________________________________  Interval history:  No acute events overnight.patient opens eyes to voice " however unable to follow any command and did not answer any yes/no questions.      Unable to to ROS.     Physical Exam   Vital Signs: Temp: 98.2  F (36.8  C) Temp src: Temporal BP: (!) 84/63 Pulse: 112   Resp: 12 SpO2: 90 % O2 Device: Mechanical Ventilator    Weight: 167 lbs 8 oz    General:  awake, calm, comfortable and intubated  Cardiac: Normal rate, regular rhythm  Respiratory: ventilated, prolonged expiratory phase  GI:  Abdomen soft, nontender, nondistended.  Neurologic: spontaneous eye opening.       Medical Decision Making       50 MINUTES SPENT BY ME on the date of service doing chart review, history, exam, documentation & further activities per the note.      Data     I have personally reviewed the following data over the past 24 hrs:    12.1 (H)  \   11.2 (L)   / 136 (L)     141 98 38.8 (H) /  96   3.5 37 (H) 0.75 \

## 2024-02-04 NOTE — PROGRESS NOTES
[facility] ICU VENTILATOR RESPIRATORY NOTE  Date of Admission: 1/26/24  Date of Intubation (most recent):1/28/24   Reason for Mechanical Ventilation: Respiratory Failure  Number of Days on Mechanical Ventilation: 3  Met Criteria for Pressure Support Trial:yes, pt weaned today on 10/10 on 25% FIO2 from 9:49 to 11:26.  Pt's Vt's ranged from 700's to 800's. The only thing notable during her weaning trial was her very low  RR, ranging from 5 to 10.  Pt was not on any sedation or given pain medication during her trial.  Pt was wide awake alert, oriented and responding appropriately when asked questions.  Plan to try weaning pt again tomorrow  ETT appearance on chest x-ray: In appropriate place.    Respiratory Therapy  Patient remains intubated on mechanical ventilator with the following settings:    Vent Mode: CMV/AC  (Continuous Mandatory Ventilation/ Assist Control)  FiO2 (%): 25 %  Resp Rate (Set): 15 breaths/min  Tidal Volume (Set, mL): 350 mL  PEEP (cm H2O): 10 cmH2O  Pressure Support (cm H2O): 12 cmH2O  Resp: (!) 8    Temp: 97.2  F (36.2  C) Temp src: Temporal BP: 101/73 Pulse: (!) 125   Resp: (!) 8 SpO2: 95 % O2 Device: Mechanical Ventilator      BS were coarse and diminished. Suctioned large amounts of yellow thick secretions through the ETT.  Will continue to follow and assess the patient's respiratory needs.    Cortney Martin, RT  2/3/2024 6:03 PM

## 2024-02-04 NOTE — PLAN OF CARE
ICU End of Shift Summary.  For vital signs and complete assessments, please see documentation flowsheets.      Neuro: RASS 0 to -1 most of night.   Cardiac: A-Fib CVR/RVR  Resp: vented. Small amount of thick secrestions.   Doing well w/ wean this morning  GI: TF running at goal. Colostomy  : Hines  Lines: LPICC. X1 PIV  Drips: Fent/Prop     Major Shift Events:   none  Discharge/Transfer Needs: tbd     Bedside Shift Report Completed: y   Bedside Safety Check Completed: y         Right wrist and Left wrist restraints continued 2/4/2024     Clinical Justification: Pulling lines, pulling tubes, and pulling equipment  Less Restrictive Alternative: Repositioning, Re-evaluate equipment, Disguise equipment, Pain management, De-escalation, Reorientation  Attending Physician Notified: MD ordered restraint,     New orders placed Yes  Length of Order: 1 Day        Eileen Hussein RN

## 2024-02-04 NOTE — PROGRESS NOTES
[facility] ICU VENTILATOR RESPIRATORY NOTE    Date of Admission: 1/26/24    Date of Intubation (most recent): 1/28/24    Reason for Mechanical Ventilation: Respiratory Failure    Number of Days on Mechanical Ventilation: 8    Met Criteria for Pressure Support Trial: Yes    Length of Pressure Support Trial:Ongoing    Significant Events Today: PS trial +10/10    ABG Results:   Recent Labs   Lab 01/31/24  1659 01/29/24  2205 01/28/24  0552   PH  --   --  7.35  7.35   PCO2  --   --  62*  62*   PO2  --   --  62*  58*   HCO3  --   --  34*  34*   O2PER 30 35 30  30       ETT appearance on chest x-ray: Endotracheal tube tip 3.5 cm above the stevie.      Plan: PS trial. Continue to assess and monitor.    Vent Mode: CPAP/PS  (Continuous positive airway pressure with Pressure Support)  FiO2 (%): 25 %  Resp Rate (Set): 15 breaths/min  Tidal Volume (Set, mL): 350 mL  PEEP (cm H2O): 10 cmH2O  Pressure Support (cm H2O): 10 cmH2O  Resp: 10    BS coarse/diminished. Will continue to assess and monitor.      Renny Gamboa RT on 2/4/2024 at 5:12 AM

## 2024-02-05 ENCOUNTER — APPOINTMENT (OUTPATIENT)
Dept: GENERAL RADIOLOGY | Facility: CLINIC | Age: 59
End: 2024-02-05
Attending: INTERNAL MEDICINE
Payer: COMMERCIAL

## 2024-02-05 LAB
ANION GAP SERPL CALCULATED.3IONS-SCNC: 6 MMOL/L (ref 7–15)
BASOPHILS # BLD AUTO: 0 10E3/UL (ref 0–0.2)
BASOPHILS NFR BLD AUTO: 0 %
BUN SERPL-MCNC: 31.9 MG/DL (ref 8–23)
CALCIUM SERPL-MCNC: 7.7 MG/DL (ref 8.8–10.2)
CHLORIDE SERPL-SCNC: 99 MMOL/L (ref 98–107)
CREAT SERPL-MCNC: 0.72 MG/DL (ref 0.51–0.95)
DEPRECATED HCO3 PLAS-SCNC: 37 MMOL/L (ref 22–29)
EGFRCR SERPLBLD CKD-EPI 2021: >90 ML/MIN/1.73M2
EOSINOPHIL # BLD AUTO: 0 10E3/UL (ref 0–0.7)
EOSINOPHIL NFR BLD AUTO: 0 %
ERYTHROCYTE [DISTWIDTH] IN BLOOD BY AUTOMATED COUNT: 14.4 % (ref 10–15)
ERYTHROCYTE [DISTWIDTH] IN BLOOD BY AUTOMATED COUNT: 14.6 % (ref 10–15)
GLUCOSE BLDC GLUCOMTR-MCNC: 112 MG/DL (ref 70–99)
GLUCOSE BLDC GLUCOMTR-MCNC: 119 MG/DL (ref 70–99)
GLUCOSE BLDC GLUCOMTR-MCNC: 133 MG/DL (ref 70–99)
GLUCOSE BLDC GLUCOMTR-MCNC: 133 MG/DL (ref 70–99)
GLUCOSE BLDC GLUCOMTR-MCNC: 136 MG/DL (ref 70–99)
GLUCOSE SERPL-MCNC: 137 MG/DL (ref 70–99)
HCT VFR BLD AUTO: 31.5 % (ref 35–47)
HCT VFR BLD AUTO: 33.1 % (ref 35–47)
HGB BLD-MCNC: 10 G/DL (ref 11.7–15.7)
HGB BLD-MCNC: 10.6 G/DL (ref 11.7–15.7)
IMM GRANULOCYTES # BLD: 0.3 10E3/UL
IMM GRANULOCYTES NFR BLD: 2 %
LYMPHOCYTES # BLD AUTO: 0.7 10E3/UL (ref 0.8–5.3)
LYMPHOCYTES NFR BLD AUTO: 5 %
MAGNESIUM SERPL-MCNC: 2.1 MG/DL (ref 1.7–2.3)
MCH RBC QN AUTO: 32.7 PG (ref 26.5–33)
MCH RBC QN AUTO: 33 PG (ref 26.5–33)
MCHC RBC AUTO-ENTMCNC: 31.7 G/DL (ref 31.5–36.5)
MCHC RBC AUTO-ENTMCNC: 32 G/DL (ref 31.5–36.5)
MCV RBC AUTO: 103 FL (ref 78–100)
MCV RBC AUTO: 103 FL (ref 78–100)
MONOCYTES # BLD AUTO: 0.7 10E3/UL (ref 0–1.3)
MONOCYTES NFR BLD AUTO: 6 %
NEUTROPHILS # BLD AUTO: 11.2 10E3/UL (ref 1.6–8.3)
NEUTROPHILS NFR BLD AUTO: 87 %
NRBC # BLD AUTO: 0 10E3/UL
NRBC BLD AUTO-RTO: 0 /100
PHOSPHATE SERPL-MCNC: 2.6 MG/DL (ref 2.5–4.5)
PLATELET # BLD AUTO: 125 10E3/UL (ref 150–450)
PLATELET # BLD AUTO: 129 10E3/UL (ref 150–450)
POTASSIUM SERPL-SCNC: 3.7 MMOL/L (ref 3.4–5.3)
RBC # BLD AUTO: 3.06 10E6/UL (ref 3.8–5.2)
RBC # BLD AUTO: 3.21 10E6/UL (ref 3.8–5.2)
RETICS # AUTO: 0.06 10E6/UL (ref 0.03–0.1)
RETICS # AUTO: 0.06 10E6/UL (ref 0.03–0.1)
RETICS/RBC NFR AUTO: 1.8 % (ref 0.5–2)
RETICS/RBC NFR AUTO: 2.1 % (ref 0.5–2)
SODIUM SERPL-SCNC: 142 MMOL/L (ref 135–145)
WBC # BLD AUTO: 11.6 10E3/UL (ref 4–11)
WBC # BLD AUTO: 13 10E3/UL (ref 4–11)

## 2024-02-05 PROCEDURE — 94640 AIRWAY INHALATION TREATMENT: CPT | Mod: 76

## 2024-02-05 PROCEDURE — 85027 COMPLETE CBC AUTOMATED: CPT | Performed by: STUDENT IN AN ORGANIZED HEALTH CARE EDUCATION/TRAINING PROGRAM

## 2024-02-05 PROCEDURE — 250N000011 HC RX IP 250 OP 636: Performed by: STUDENT IN AN ORGANIZED HEALTH CARE EDUCATION/TRAINING PROGRAM

## 2024-02-05 PROCEDURE — 94640 AIRWAY INHALATION TREATMENT: CPT

## 2024-02-05 PROCEDURE — 999N000259 HC STATISTIC EXTUBATION

## 2024-02-05 PROCEDURE — 250N000013 HC RX MED GY IP 250 OP 250 PS 637: Performed by: ANESTHESIOLOGY

## 2024-02-05 PROCEDURE — 85025 COMPLETE CBC W/AUTO DIFF WBC: CPT | Performed by: STUDENT IN AN ORGANIZED HEALTH CARE EDUCATION/TRAINING PROGRAM

## 2024-02-05 PROCEDURE — 94003 VENT MGMT INPAT SUBQ DAY: CPT

## 2024-02-05 PROCEDURE — 80048 BASIC METABOLIC PNL TOTAL CA: CPT | Performed by: STUDENT IN AN ORGANIZED HEALTH CARE EDUCATION/TRAINING PROGRAM

## 2024-02-05 PROCEDURE — 250N000011 HC RX IP 250 OP 636: Performed by: INTERNAL MEDICINE

## 2024-02-05 PROCEDURE — 99233 SBSQ HOSP IP/OBS HIGH 50: CPT | Performed by: STUDENT IN AN ORGANIZED HEALTH CARE EDUCATION/TRAINING PROGRAM

## 2024-02-05 PROCEDURE — 999N000157 HC STATISTIC RCP TIME EA 10 MIN

## 2024-02-05 PROCEDURE — 99233 SBSQ HOSP IP/OBS HIGH 50: CPT | Performed by: INTERNAL MEDICINE

## 2024-02-05 PROCEDURE — 200N000001 HC R&B ICU

## 2024-02-05 PROCEDURE — 84100 ASSAY OF PHOSPHORUS: CPT | Performed by: STUDENT IN AN ORGANIZED HEALTH CARE EDUCATION/TRAINING PROGRAM

## 2024-02-05 PROCEDURE — 250N000013 HC RX MED GY IP 250 OP 250 PS 637: Performed by: INTERNAL MEDICINE

## 2024-02-05 PROCEDURE — 85060 BLOOD SMEAR INTERPRETATION: CPT | Performed by: PATHOLOGY

## 2024-02-05 PROCEDURE — 85045 AUTOMATED RETICULOCYTE COUNT: CPT | Performed by: STUDENT IN AN ORGANIZED HEALTH CARE EDUCATION/TRAINING PROGRAM

## 2024-02-05 PROCEDURE — 250N000013 HC RX MED GY IP 250 OP 250 PS 637: Performed by: STUDENT IN AN ORGANIZED HEALTH CARE EDUCATION/TRAINING PROGRAM

## 2024-02-05 PROCEDURE — 36415 COLL VENOUS BLD VENIPUNCTURE: CPT | Performed by: STUDENT IN AN ORGANIZED HEALTH CARE EDUCATION/TRAINING PROGRAM

## 2024-02-05 PROCEDURE — 71045 X-RAY EXAM CHEST 1 VIEW: CPT

## 2024-02-05 PROCEDURE — 258N000003 HC RX IP 258 OP 636: Performed by: INTERNAL MEDICINE

## 2024-02-05 PROCEDURE — 250N000009 HC RX 250: Performed by: STUDENT IN AN ORGANIZED HEALTH CARE EDUCATION/TRAINING PROGRAM

## 2024-02-05 PROCEDURE — 83735 ASSAY OF MAGNESIUM: CPT | Performed by: HOSPITALIST

## 2024-02-05 PROCEDURE — 250N000009 HC RX 250: Performed by: INTERNAL MEDICINE

## 2024-02-05 RX ORDER — POTASSIUM CHLORIDE 20MEQ/15ML
20 LIQUID (ML) ORAL ONCE
Status: COMPLETED | OUTPATIENT
Start: 2024-02-05 | End: 2024-02-05

## 2024-02-05 RX ORDER — NYSTATIN 100000 U/G
CREAM TOPICAL 2 TIMES DAILY
Status: DISCONTINUED | OUTPATIENT
Start: 2024-02-05 | End: 2024-02-14 | Stop reason: HOSPADM

## 2024-02-05 RX ORDER — METHYLPREDNISOLONE SODIUM SUCCINATE 40 MG/ML
30 INJECTION, POWDER, LYOPHILIZED, FOR SOLUTION INTRAMUSCULAR; INTRAVENOUS EVERY 12 HOURS
Status: DISCONTINUED | OUTPATIENT
Start: 2024-02-05 | End: 2024-02-06

## 2024-02-05 RX ADMIN — Medication 15 ML: at 09:10

## 2024-02-05 RX ADMIN — IPRATROPIUM BROMIDE AND ALBUTEROL SULFATE 3 ML: .5; 3 SOLUTION RESPIRATORY (INHALATION) at 20:49

## 2024-02-05 RX ADMIN — POTASSIUM CHLORIDE 20 MEQ: 20 SOLUTION ORAL at 06:02

## 2024-02-05 RX ADMIN — POTASSIUM & SODIUM PHOSPHATES POWDER PACK 280-160-250 MG 1 PACKET: 280-160-250 PACK at 04:19

## 2024-02-05 RX ADMIN — CLONAZEPAM 0.5 MG: 0.5 TABLET ORAL at 09:11

## 2024-02-05 RX ADMIN — NYSTATIN: 100000 CREAM TOPICAL at 20:38

## 2024-02-05 RX ADMIN — NYSTATIN: 100000 CREAM TOPICAL at 14:53

## 2024-02-05 RX ADMIN — LEVOTHYROXINE SODIUM 112 MCG: 0.11 TABLET ORAL at 09:11

## 2024-02-05 RX ADMIN — METHYLPREDNISOLONE SODIUM SUCCINATE 30 MG: 40 INJECTION, POWDER, FOR SOLUTION INTRAMUSCULAR; INTRAVENOUS at 16:26

## 2024-02-05 RX ADMIN — GUAIFENESIN 200 MG: 200 SOLUTION ORAL at 04:19

## 2024-02-05 RX ADMIN — IPRATROPIUM BROMIDE AND ALBUTEROL SULFATE 3 ML: .5; 3 SOLUTION RESPIRATORY (INHALATION) at 11:26

## 2024-02-05 RX ADMIN — GUAIFENESIN 200 MG: 200 SOLUTION ORAL at 09:10

## 2024-02-05 RX ADMIN — PAROXETINE HYDROCHLORIDE 20 MG: 20 TABLET, FILM COATED ORAL at 09:11

## 2024-02-05 RX ADMIN — AMIODARONE HYDROCHLORIDE 200 MG: 200 TABLET ORAL at 09:11

## 2024-02-05 RX ADMIN — FUROSEMIDE 20 MG: 10 INJECTION, SOLUTION INTRAMUSCULAR; INTRAVENOUS at 09:10

## 2024-02-05 RX ADMIN — Medication 40 MG: at 09:10

## 2024-02-05 RX ADMIN — PROPOFOL 20 MCG/KG/MIN: 10 INJECTION, EMULSION INTRAVENOUS at 00:56

## 2024-02-05 RX ADMIN — ATORVASTATIN CALCIUM 20 MG: 20 TABLET, FILM COATED ORAL at 09:10

## 2024-02-05 RX ADMIN — IPRATROPIUM BROMIDE AND ALBUTEROL SULFATE 3 ML: .5; 3 SOLUTION RESPIRATORY (INHALATION) at 07:34

## 2024-02-05 RX ADMIN — APIXABAN 5 MG: 5 TABLET, FILM COATED ORAL at 09:10

## 2024-02-05 RX ADMIN — FUROSEMIDE 20 MG: 10 INJECTION, SOLUTION INTRAMUSCULAR; INTRAVENOUS at 20:32

## 2024-02-05 RX ADMIN — SODIUM PHOSPHATE, MONOBASIC, MONOHYDRATE AND SODIUM PHOSPHATE, DIBASIC, ANHYDROUS 9 MMOL: 142; 276 INJECTION, SOLUTION INTRAVENOUS at 06:02

## 2024-02-05 RX ADMIN — IPRATROPIUM BROMIDE AND ALBUTEROL SULFATE 3 ML: .5; 3 SOLUTION RESPIRATORY (INHALATION) at 16:03

## 2024-02-05 ASSESSMENT — ACTIVITIES OF DAILY LIVING (ADL)
ADLS_ACUITY_SCORE: 34
ADLS_ACUITY_SCORE: 40
ADLS_ACUITY_SCORE: 34
ADLS_ACUITY_SCORE: 36
ADLS_ACUITY_SCORE: 34
ADLS_ACUITY_SCORE: 40
ADLS_ACUITY_SCORE: 34
ADLS_ACUITY_SCORE: 32
ADLS_ACUITY_SCORE: 40
ADLS_ACUITY_SCORE: 40

## 2024-02-05 NOTE — PROGRESS NOTES
[facility]    Medicine Progress Note - Hospitalist Service    Date of Admission:  1/22/2024    Assessment & Plan   Lara Melendez is a 58 year old female with a past medical history of COPD, atrial fibrillation, chronic anticoagulation with Eliquis, hypothyroidism, hypertension and anxiety with multiple recent admissions for COPD exacerbation, but again for another COPD exacerbation, now intubated.  Regular attempts on weaning off mechanical ventilation.  Extubated on 2/5/2024      Acute hypoxemic hypercarbic respiratory failure 2/2 severe COPD Exacerbation  Severe COPD  History of intubation with tracheostomy  Extubated 2/5/2024  Presented with COPD exacerbation; has had frequent exacerbations requiring admission, with this being her third admission in 3 weeks. She presented again with COPD exacerbation. she continued to experience air hunger and a feeling of anxiety/distress, despite seemingly adequate oxygenation and stable gases. She had received continuous nebs, nebs scheduled q2h, steroids, azithromycin, and benzos without relief. She went into AF with RVR  1/28 and was intubated for dyspnea, tachypnea. No significant improvement since intubation. Appears at least 10 L up since admission .  Improving with IV diuresis.    -Extubated, stop IV fentanyl and propofol   -PTA meds include breo and prn duonebs, no maintenance anticholinergic and not on any meds to reduce exacerbations.   -Pulmonary consulted, appreciate recommendations  -completed a 6 day course of IV methylprednisolone  -continue gentle diuresis IV lasix 20 mg BID  -continue duonebs QID  -On oxy mask 4 L, wean as able  -Incentive spirometry      Plan upon discharge per pulmonology:  consider adding chronic azithromycin or Roflumilast at discharge to reduce exacerbations  consider steroid nebs rather than inhaler at discharge to increase pulmonary steroid delivery  consider evaluation for inflammatory/immune trigger  "(eosinophils, IgE) as an outpatient when off prednisone  follow up with pulmonology at discharge  no obvious trigger for current admission, her recent admissions have had symptoms that respond to prednisone with return of symptoms when prednisone complete; ? If she would be a patient that would consider chronic prednisone use if fails other treatment  would benefit from outpatient palliative support for symptomatic management    Afib with RVR  On both rate and rhythm control.  On DOAC as well.  -Continue amiodarone 200 mg daily, need alternative agent as amiodarone not a good option in the long run given significant underlying COPD  -continue  metoprolol to 37.5 mg twice daily, with holding parameters.    Leukocytosis  Acute microcytic anemia  Acute thrombocytopenia  Leukocytosis likely due to steroid use, downtrending.  Patient also noted to have acute anemia as well as thrombocytopenia, could be related to acute illness and frequent phlebotomies.  - Will check reticulocyte count and peripheral smear     Diet: Combination Diet Regular Diet Adult  Adult Formula Drip Feeding: Continuous Vital High Protein; Orogastric tube; Goal Rate: 60; mL/hr; x22 hours (hold 1 hour before and 1 hour after synthroid administration)    DVT Prophylaxis: DOAC  Hines Catheter: PRESENT, indication: Strict 1-2 Hour I&O  Lines: PRESENT      PICC 01/28/24 Triple Lumen Left Brachial vein lateral-Site Assessment: WDL    Cardiac Monitoring: ACTIVE order. Indication: Tachyarrhythmias, acute (48 hours)  Code Status: Full Code      Clinically Significant Risk Factors                # Thrombocytopenia: Lowest platelets = 125 in last 2 days, will monitor for bleeding   # Hypertension: Noted on problem list        # Overweight: Estimated body mass index is 29.03 kg/m  as calculated from the following:    Height as of this encounter: 1.6 m (5' 3\").    Weight as of this encounter: 74.3 kg (163 lb 14.4 oz).             Disposition Plan   Pending " clinical improvement. Anticipate 2-3 days     Tammie Hart MD  Hospitalist Service  [facility]  Securely message with [employer] (more info)  Text page via [employer] Paging/Directory   ______________________________________________________________________  Interval history:  No acute events overnight.patient opens eyes to voice and follows command.  Passed breathing trial today.  Extubated.      Physical Exam   Vital Signs: Temp: 98.2  F (36.8  C) Temp src: Temporal BP: 94/55 Pulse: (!) 125   Resp: 24 SpO2: 94 % O2 Device: Mechanical Ventilator    Weight: 163 lbs 14.4 oz    General:  awake, calm, comfortable  Cardiac: Normal rate, regular rhythm  Respiratory: Clear anteriorly, prolonged expiratory phase  GI:  Abdomen soft, nontender, nondistended.  Neurologic: spontaneous eye opening.  Follows command      Medical Decision Making       52 MINUTES SPENT BY ME on the date of service doing chart review, history, exam, documentation & further activities per the note.      Data     I have personally reviewed the following data over the past 24 hrs:    11.6 (H)  \   10.6 (L)   / 125 (L)     142 99 31.9 (H) /  133 (H)   3.7 37 (H) 0.72 \

## 2024-02-05 NOTE — PROGRESS NOTES
[facility] ICU VENTILATOR RESPIRATORY NOTE     Date of Admission: 1/26/24     Date of Intubation (most recent): 1/28/24     Reason for Mechanical Ventilation: Respiratory Failure     Number of Days on Mechanical Ventilation: 9     Met Criteria for Pressure Support Trial: Yes     Length of Pressure Support Trial:Ongoing     Significant Events Today: PS trial +10/10     ABG Results:   Venous Blood Gas  Recent Labs   Lab 01/31/24  1659 01/29/24  2205   PHV 7.35 7.28*   PCO2V 65* 72*   PO2V 39 95*   HCO3V 36* 34*   SUSANNAH 8.2* 5.4*   O2PER 30 35     ETT appearance on chest x-ray: Endotracheal tube tip 3.5 cm above the stevie.       Plan: PS trial. Continue to assess and monitor.    Vent Mode: CMV/AC  (Continuous Mandatory Ventilation/ Assist Control)  FiO2 (%): 30 %  Resp Rate (Set): 15 breaths/min  Tidal Volume (Set, mL): 350 mL  PEEP (cm H2O): 10 cmH2O  Pressure Support (cm H2O): 10 cmH2O  Resp: 17    Bs coarse/diminished. Suctioned for small thick cloudy secretions. Will continue to assess and monitor.    Renny Gamboa RT on 2/5/2024 at 4:09 AM

## 2024-02-05 NOTE — PROGRESS NOTES
Right wrist and Left wrist restraints discontinued at 11:15 AM on 2/5/2024.    Restraint discontinue criteria met, patient is calm, cooperative and safe. Restraints removed.     Patient's Response: No evidence of learning  Family Notification: Other  Attending Physician Notified: MD ordered restraint,      LG CROOK RN

## 2024-02-05 NOTE — PROGRESS NOTES
Right wrist and Left wrist restraints continued 2/5/2024    Clinical Justification: Pulling lines, pulling tubes, and pulling equipment  Less Restrictive Alternative: Repositioning, Re-evaluate equipment, Disguise equipment, Pain management, Alarm, De-escalation, Reorientation  Attending Physician Notified: MD ordered restraint,     New orders placed Yes  Length of Order: 1 Day      LG CROOK RN     Notified provider about indwelling el catheter discussed removal or continued need.    Did provider choose to remove indwelling el catheter? NO    Provider's el indication for keeping indwelling el catheter: hemodynamic instability    Is there an order for indwelling el catheter? YES    *If there is a plan to keep el catheter in place at discharge daily notification with provider is not necessary, but please add a notation in the treatment team sticky note that the patient will be discharging with the catheter.

## 2024-02-05 NOTE — PROGRESS NOTES
ICU Attending Note    I have seen and examined Lara Melendez, reviewed the patient's history, pertinent labs, vital signs, medications, physical exam, and radiographs.  The patient is critically ill by my examination and requires continued ICU monitoring and cares    Lara Melendez is admitted to ICU with a COPD exacerbation.  Has had multiple recent admissions for the same.  Recently completed a steroid taper.    Recent Events:  Tolerating PS 10/10 this AM, transitioned to 7/5 and passed 1 hour with no respiratory distress or change in mentation. Proceeding to extubation.     Exam:  Temp:  [97.3  F (36.3  C)-98.5  F (36.9  C)] 98.5  F (36.9  C)  Pulse:  [] 97  Resp:  [12-25] 16  BP: ()/(49-72) 95/55  FiO2 (%):  [30 %-35 %] 30 %  SpO2:  [88 %-100 %] 94 %  @I/O last 3 completed shifts:  In: 2361.09 [I.V.:531.09; NG/GT:630]  Out: 3480 [Urine:3130; Stool:350]  Vent Mode: CPAP/PS  (Continuous positive airway pressure with Pressure Support)  FiO2 (%): 30 %  Resp Rate (Set): 15 breaths/min  Tidal Volume (Set, mL): 350 mL  PEEP (cm H2O): 10 cmH2O  Pressure Support (cm H2O): 10 cmH2O  Resp: 16    Lungs Good AE BL with scattered wheezes and prolonged expiratory phase   Heart RRR, distant sounds  Abd ostomy bag, nontender  Ext warm, perfused    Results:  ABG   No lab results found in last 7 days.    CBC  Recent Labs   Lab 02/05/24  0418 02/04/24  0355 02/02/24  1245 01/31/24  0926   WBC 11.6* 12.1* 18.1* 8.6   HGB 10.6* 11.2* 12.2 12.3   HCT 33.1* 35.1 37.6 39.2   * 136* 167 166     BMP  Recent Labs   Lab 02/05/24  0748 02/05/24  0425 02/05/24  0418 02/04/24  2340 02/04/24  0831 02/04/24  0355 02/03/24  0758 02/03/24  0438 02/02/24  0827 02/02/24  0447   NA  --   --  142  --   --  141  --  141  --  141   POTASSIUM  --   --  3.7  --   --  3.5  --  3.9  --  4.9   CHLORIDE  --   --  99  --   --  98  --  101  --  103   CO2  --   --  37*  --   --  37*  --  32*  --  33*   BUN  --   --  31.9*  --   --  38.8*   --  36.8*  --  29.7*   CR  --   --  0.72  --   --  0.75  --  0.62  --  0.55   * 133* 137* 121*   < > 96   < > 133*  140*   < > 166*    < > = values in this interval not displayed.     LFT  No lab results found in last 7 days.    PancreasNo lab results found in last 7 days.  INR  Lab Results   Component Value Date    INR 1.04 06/21/2023    INR 0.97 03/26/2023    INR 0.91 10/26/2022    INR 1.04 09/27/2022       Current Issues:    Acute hypoxic and hypercarbic respiratory failure  AECOPD   History of trach tube placement   Steroids, inhalers, ongoing furosemide. Completed azithromycin course 1/30/24. Ongoing vent wean, and today tolerated 10/10 PS well, change PS to 7/5 and monitor for 1 hour, continued to have intact mentation and no respiratory distress, VE 7-8 with normal vitals.  - Extubate to facemask   - Titrate O2 for SpO2 > 90%   - Solumedrol 30 mg IV q12 x3 days   - Continue scheduled duoneb QID   - Transition to short course of po prednisone depending on clinical response; suggest prednisone 30 mg po daily x3 days then stop    - Signing off, please call us back if needed    Protein calorie malnutrition: based on exam, limited intake, and work of breathing for severe COPD.  Tube feeds.   Atrial fibrillation: on amio, need a long term plan.  Lopressor. DOAC.    Discussed course of events with daughter yesterday.  Explained probable need for tracheostomy again.        Keyanna Gutierrez MD  Pulmonary & Critical Care Medicine      Interval Events:    No acute events overnight    S: Patient doing well, denies fevers, chills, nausea, emesis, chest pain, SOB.  Pain is well controlled. Tolerating PO w/o N/V.  +/+ F/BM.    O: Vital Signs Last 24 Hrs  T(C): 36.4 (14 Aug 2019 05:57), Max: 37 (13 Aug 2019 14:26)  T(F): 97.5 (14 Aug 2019 05:57), Max: 98.6 (13 Aug 2019 14:26)  HR: 87 (14 Aug 2019 05:57) (69 - 87)  BP: 155/64 (14 Aug 2019 05:57) (138/54 - 155/64)  BP(mean): --  RR: 16 (14 Aug 2019 05:57) (16 - 18)  SpO2: 97% (14 Aug 2019 05:57) (97% - 99%)      13 Aug 2019 07:01  -  14 Aug 2019 07:00  --------------------------------------------------------  IN:    Oral Fluid: 400 mL  Total IN: 400 mL    OUT:    Voided: 1100 mL  Total OUT: 1100 mL    Total NET: -700 mL          Physical Exam:    Gen: Well-developed, well-nourished in no acute distress  Resp: Clear to auscultation bilaterally with no wheezes, rale, or rhonchi  CV: Regular rate and rhythm with no murmur, gallop, or rub  GI: Soft, non-tender, distended with normoactive bowel sounds.  No masses.  MSK: Moves all extremities equally  Skin: No rashes    Labs:    CAPILLARY BLOOD GLUCOSE      POCT Blood Glucose.: 155 mg/dL (13 Aug 2019 21:26)  POCT Blood Glucose.: 132 mg/dL (13 Aug 2019 17:57)  POCT Blood Glucose.: 153 mg/dL (13 Aug 2019 13:04)  POCT Blood Glucose.: 114 mg/dL (13 Aug 2019 08:52)    MEDICATIONS  (STANDING):  atorvastatin 20 milliGRAM(s) Oral at bedtime  dextrose 50% Injectable 12.5 Gram(s) IV Push once  dextrose 50% Injectable 25 Gram(s) IV Push once  dextrose 50% Injectable 25 Gram(s) IV Push once  enoxaparin Injectable 40 milliGRAM(s) SubCutaneous every 12 hours  furosemide    Tablet 20 milliGRAM(s) Oral daily  insulin lispro (HumaLOG) corrective regimen sliding scale   SubCutaneous three times a day before meals  losartan 100 milliGRAM(s) Oral daily    MEDICATIONS  (PRN):  acetaminophen   Tablet .. 975 milliGRAM(s) Oral every 6 hours PRN Mild Pain (1 - 3), Moderate Pain (4 - 6), Severe Pain (7 - 10)  dextrose 40% Gel 15 Gram(s) Oral once PRN Blood Glucose LESS THAN 70 milliGRAM(s)/deciliter  glucagon  Injectable 1 milliGRAM(s) IntraMuscular once PRN Glucose LESS THAN 70 milligrams/deciliter  polyethylene glycol 3350 17 Gram(s) Oral daily PRN Constipation

## 2024-02-05 NOTE — PLAN OF CARE
ICU End of Shift Summary.  For vital signs and complete assessments, please see documentation flowsheets.      Neuro: RASS 0 to -1 most of night.   Cardiac: A-Fib CVR/RVR  Resp: vented. Small amount of thick secrestions.   Doing well w/ wean this morning  GI: TF running at goal. Colostomy  : Hines  Lines: LPICC. X1 PIV  Drips: Fent/Prop     Major Shift Events:   none  Discharge/Transfer Needs: tbd     Bedside Shift Report Completed: y   Bedside Safety Check Completed: y    Right wrist and Left wrist restraints continued 2/5/2024    Clinical Justification: Pulling lines, pulling tubes, and pulling equipment  Less Restrictive Alternative: Repositioning, Pain management, Disguise equipment  Attending Physician Notified: MD ordered restraint,     New orders placed Yes  Length of Order: 1 Day      Eileen Hussein RN

## 2024-02-06 ENCOUNTER — APPOINTMENT (OUTPATIENT)
Dept: SPEECH THERAPY | Facility: CLINIC | Age: 59
End: 2024-02-06
Attending: STUDENT IN AN ORGANIZED HEALTH CARE EDUCATION/TRAINING PROGRAM
Payer: COMMERCIAL

## 2024-02-06 LAB
ANION GAP SERPL CALCULATED.3IONS-SCNC: 8 MMOL/L (ref 7–15)
BUN SERPL-MCNC: 25.5 MG/DL (ref 8–23)
CALCIUM SERPL-MCNC: 8.3 MG/DL (ref 8.8–10.2)
CHLORIDE SERPL-SCNC: 100 MMOL/L (ref 98–107)
CREAT SERPL-MCNC: 0.63 MG/DL (ref 0.51–0.95)
DEPRECATED HCO3 PLAS-SCNC: 35 MMOL/L (ref 22–29)
EGFRCR SERPLBLD CKD-EPI 2021: >90 ML/MIN/1.73M2
ERYTHROCYTE [DISTWIDTH] IN BLOOD BY AUTOMATED COUNT: 14.1 % (ref 10–15)
GLUCOSE BLDC GLUCOMTR-MCNC: 102 MG/DL (ref 70–99)
GLUCOSE BLDC GLUCOMTR-MCNC: 113 MG/DL (ref 70–99)
GLUCOSE BLDC GLUCOMTR-MCNC: 113 MG/DL (ref 70–99)
GLUCOSE BLDC GLUCOMTR-MCNC: 120 MG/DL (ref 70–99)
GLUCOSE BLDC GLUCOMTR-MCNC: 125 MG/DL (ref 70–99)
GLUCOSE BLDC GLUCOMTR-MCNC: 132 MG/DL (ref 70–99)
GLUCOSE SERPL-MCNC: 123 MG/DL (ref 70–99)
HCT VFR BLD AUTO: 32.1 % (ref 35–47)
HGB BLD-MCNC: 10.1 G/DL (ref 11.7–15.7)
MAGNESIUM SERPL-MCNC: 2.2 MG/DL (ref 1.7–2.3)
MCH RBC QN AUTO: 32.5 PG (ref 26.5–33)
MCHC RBC AUTO-ENTMCNC: 31.5 G/DL (ref 31.5–36.5)
MCV RBC AUTO: 103 FL (ref 78–100)
PATH REPORT.COMMENTS IMP SPEC: NORMAL
PATH REPORT.COMMENTS IMP SPEC: NORMAL
PATH REPORT.FINAL DX SPEC: NORMAL
PATH REPORT.MICROSCOPIC SPEC OTHER STN: NORMAL
PATH REPORT.MICROSCOPIC SPEC OTHER STN: NORMAL
PHOSPHATE SERPL-MCNC: 3 MG/DL (ref 2.5–4.5)
PLATELET # BLD AUTO: 112 10E3/UL (ref 150–450)
POTASSIUM SERPL-SCNC: 3.9 MMOL/L (ref 3.4–5.3)
RBC # BLD AUTO: 3.11 10E6/UL (ref 3.8–5.2)
SODIUM SERPL-SCNC: 143 MMOL/L (ref 135–145)
WBC # BLD AUTO: 13.2 10E3/UL (ref 4–11)

## 2024-02-06 PROCEDURE — 94640 AIRWAY INHALATION TREATMENT: CPT | Mod: 76

## 2024-02-06 PROCEDURE — 250N000012 HC RX MED GY IP 250 OP 636 PS 637: Performed by: STUDENT IN AN ORGANIZED HEALTH CARE EDUCATION/TRAINING PROGRAM

## 2024-02-06 PROCEDURE — 250N000011 HC RX IP 250 OP 636: Performed by: STUDENT IN AN ORGANIZED HEALTH CARE EDUCATION/TRAINING PROGRAM

## 2024-02-06 PROCEDURE — 250N000013 HC RX MED GY IP 250 OP 250 PS 637: Performed by: INTERNAL MEDICINE

## 2024-02-06 PROCEDURE — 200N000001 HC R&B ICU

## 2024-02-06 PROCEDURE — 85027 COMPLETE CBC AUTOMATED: CPT | Performed by: STUDENT IN AN ORGANIZED HEALTH CARE EDUCATION/TRAINING PROGRAM

## 2024-02-06 PROCEDURE — 83735 ASSAY OF MAGNESIUM: CPT | Performed by: HOSPITALIST

## 2024-02-06 PROCEDURE — 999N000157 HC STATISTIC RCP TIME EA 10 MIN

## 2024-02-06 PROCEDURE — 84100 ASSAY OF PHOSPHORUS: CPT | Performed by: INTERNAL MEDICINE

## 2024-02-06 PROCEDURE — 250N000013 HC RX MED GY IP 250 OP 250 PS 637: Performed by: ANESTHESIOLOGY

## 2024-02-06 PROCEDURE — 250N000013 HC RX MED GY IP 250 OP 250 PS 637: Performed by: STUDENT IN AN ORGANIZED HEALTH CARE EDUCATION/TRAINING PROGRAM

## 2024-02-06 PROCEDURE — 250N000009 HC RX 250: Performed by: STUDENT IN AN ORGANIZED HEALTH CARE EDUCATION/TRAINING PROGRAM

## 2024-02-06 PROCEDURE — G0463 HOSPITAL OUTPT CLINIC VISIT: HCPCS

## 2024-02-06 PROCEDURE — 250N000011 HC RX IP 250 OP 636: Performed by: INTERNAL MEDICINE

## 2024-02-06 PROCEDURE — 92526 ORAL FUNCTION THERAPY: CPT | Mod: GN

## 2024-02-06 PROCEDURE — 94640 AIRWAY INHALATION TREATMENT: CPT

## 2024-02-06 PROCEDURE — 92610 EVALUATE SWALLOWING FUNCTION: CPT | Mod: GN

## 2024-02-06 PROCEDURE — 80048 BASIC METABOLIC PNL TOTAL CA: CPT | Performed by: STUDENT IN AN ORGANIZED HEALTH CARE EDUCATION/TRAINING PROGRAM

## 2024-02-06 PROCEDURE — 99233 SBSQ HOSP IP/OBS HIGH 50: CPT | Performed by: STUDENT IN AN ORGANIZED HEALTH CARE EDUCATION/TRAINING PROGRAM

## 2024-02-06 RX ORDER — PANTOPRAZOLE SODIUM 40 MG/1
40 TABLET, DELAYED RELEASE ORAL
Status: DISCONTINUED | OUTPATIENT
Start: 2024-02-06 | End: 2024-02-14 | Stop reason: HOSPADM

## 2024-02-06 RX ORDER — PREDNISONE 20 MG/1
40 TABLET ORAL DAILY
Status: COMPLETED | OUTPATIENT
Start: 2024-02-06 | End: 2024-02-08

## 2024-02-06 RX ADMIN — AMIODARONE HYDROCHLORIDE 200 MG: 200 TABLET ORAL at 09:09

## 2024-02-06 RX ADMIN — NYSTATIN: 100000 CREAM TOPICAL at 12:34

## 2024-02-06 RX ADMIN — FUROSEMIDE 20 MG: 10 INJECTION, SOLUTION INTRAMUSCULAR; INTRAVENOUS at 08:26

## 2024-02-06 RX ADMIN — GUAIFENESIN 200 MG: 200 SOLUTION ORAL at 12:34

## 2024-02-06 RX ADMIN — IPRATROPIUM BROMIDE AND ALBUTEROL SULFATE 3 ML: .5; 3 SOLUTION RESPIRATORY (INHALATION) at 08:49

## 2024-02-06 RX ADMIN — FUROSEMIDE 20 MG: 10 INJECTION, SOLUTION INTRAMUSCULAR; INTRAVENOUS at 20:27

## 2024-02-06 RX ADMIN — METHYLPREDNISOLONE SODIUM SUCCINATE 30 MG: 40 INJECTION, POWDER, FOR SOLUTION INTRAMUSCULAR; INTRAVENOUS at 04:02

## 2024-02-06 RX ADMIN — ATORVASTATIN CALCIUM 20 MG: 20 TABLET, FILM COATED ORAL at 09:08

## 2024-02-06 RX ADMIN — PREDNISONE 40 MG: 20 TABLET ORAL at 12:34

## 2024-02-06 RX ADMIN — LEVOTHYROXINE SODIUM 112 MCG: 0.11 TABLET ORAL at 09:09

## 2024-02-06 RX ADMIN — PANTOPRAZOLE SODIUM 40 MG: 40 TABLET, DELAYED RELEASE ORAL at 12:34

## 2024-02-06 RX ADMIN — PAROXETINE HYDROCHLORIDE 20 MG: 20 TABLET, FILM COATED ORAL at 09:09

## 2024-02-06 RX ADMIN — IPRATROPIUM BROMIDE AND ALBUTEROL SULFATE 3 ML: .5; 3 SOLUTION RESPIRATORY (INHALATION) at 19:46

## 2024-02-06 RX ADMIN — NYSTATIN: 100000 CREAM TOPICAL at 20:34

## 2024-02-06 RX ADMIN — APIXABAN 5 MG: 5 TABLET, FILM COATED ORAL at 20:25

## 2024-02-06 RX ADMIN — METOPROLOL TARTRATE 37.5 MG: 25 TABLET, FILM COATED ORAL at 20:25

## 2024-02-06 RX ADMIN — CLONAZEPAM 0.5 MG: 0.5 TABLET ORAL at 09:08

## 2024-02-06 RX ADMIN — IPRATROPIUM BROMIDE AND ALBUTEROL SULFATE 3 ML: .5; 3 SOLUTION RESPIRATORY (INHALATION) at 15:56

## 2024-02-06 RX ADMIN — APIXABAN 5 MG: 5 TABLET, FILM COATED ORAL at 09:09

## 2024-02-06 RX ADMIN — IPRATROPIUM BROMIDE AND ALBUTEROL SULFATE 3 ML: .5; 3 SOLUTION RESPIRATORY (INHALATION) at 12:09

## 2024-02-06 ASSESSMENT — ACTIVITIES OF DAILY LIVING (ADL)
ADLS_ACUITY_SCORE: 36

## 2024-02-06 NOTE — PROGRESS NOTES
[facility]    Medicine Progress Note - Hospitalist Service    Date of Admission:  1/22/2024    Assessment & Plan   Lara Melendez is a 58 year old female with a past medical history of COPD, atrial fibrillation, chronic anticoagulation with Eliquis, hypothyroidism, hypertension and anxiety with multiple recent admissions for COPD exacerbation, but again for another COPD exacerbation, now intubated.  Regular attempts on weaning off mechanical ventilation.  Extubated on 2/5/2024      Acute hypoxemic hypercarbic respiratory failure 2/2 severe COPD Exacerbation  Severe COPD  History of intubation with tracheostomy  Extubated 2/5/2024  Presented with COPD exacerbation; has had frequent exacerbations requiring admission, with this being her third admission in 3 weeks. She presented again with COPD exacerbation. she continued to experience air hunger and a feeling of anxiety/distress, despite seemingly adequate oxygenation and stable gases. She had received continuous nebs, nebs scheduled q2h, steroids, azithromycin, and benzos without relief. She went into AF with RVR  1/28 and was intubated for dyspnea, tachypnea.  Really patient was not improving and thought about a potential tracheostomy.  Patient was a started on IV diuresis, which worked really well for her.  Extubated on 2/5/2024.    -PTA meds include breo and prn duonebs, no maintenance anticholinergic and not on any meds to reduce exacerbations.   -Pulmonary consulted, appreciate recommendations  -completed a 6 day course of IV methylprednisolone  -Patient noted to be tight/wheezing upon exam.  Started on IV methylprednisolone yesterday.  Okay to switch to p.o. prednisone.  - Start prednisone 40 mg burst for 4 days  -continue gentle diuresis IV lasix 20 mg BID  -continue duonebs QID  -On oxy mask 4 L, wean as able  -Incentive spirometry      Plan upon discharge per pulmonology:  consider adding chronic azithromycin or Roflumilast at  discharge to reduce exacerbations  consider steroid nebs rather than inhaler at discharge to increase pulmonary steroid delivery  consider evaluation for inflammatory/immune trigger (eosinophils, IgE) as an outpatient when off prednisone  follow up with pulmonology at discharge  no obvious trigger for current admission, her recent admissions have had symptoms that respond to prednisone with return of symptoms when prednisone complete; ? If she would be a patient that would consider chronic prednisone use if fails other treatment  would benefit from outpatient palliative support for symptomatic management    Afib with RVR  On both rate and rhythm control.  On DOAC as well.  -Continue amiodarone 200 mg daily, need alternative agent as amiodarone not a good option in the long run given significant underlying COPD  -continue  metoprolol to 37.5 mg twice daily, with holding parameters.  Patient may need down titration if continues to be bradycardic and/or hypotensive.    Leukocytosis  Acute microcytic anemia  Acute thrombocytopenia  Leukocytosis likely due to steroid use, downtrending.  Patient also noted to have acute anemia as well as thrombocytopenia, could be related to acute illness and frequent phlebotomies.  Probably smear done which showed leukoerythroblastic reaction likely in the setting of acute stress.  - Continue to monitor CBC daily       Diet: Combination Diet Full Liquid Diet; Mildly Thick (level 2) (ice chips OK; hold if any coughing/decline in respiratory status)    DVT Prophylaxis: DOAC  Hines Catheter: PRESENT, indication: Strict 1-2 Hour I&O  Lines: PRESENT      PICC 01/28/24 Triple Lumen Left Brachial vein lateral-Site Assessment: WDL    Cardiac Monitoring: ACTIVE order. Indication: Tachyarrhythmias, acute (48 hours)  Code Status: Full Code      Clinically Significant Risk Factors                # Thrombocytopenia: Lowest platelets = 112 in last 2 days, will monitor for bleeding   # Hypertension:  "Noted on problem list        # Overweight: Estimated body mass index is 29.03 kg/m  as calculated from the following:    Height as of this encounter: 1.6 m (5' 3\").    Weight as of this encounter: 74.3 kg (163 lb 14.4 oz).             Disposition Plan   Pending clinical improvement. Anticipate 2-3 days     Tammie Hart MD  Hospitalist Service  [facility]  Securely message with [employer] (more info)  Text page via [employer] Paging/Directory   ______________________________________________________________________  Interval history:  No acute events overnight.  Patient reports that her breathing is a lot better.  She has ongoing cough and she is producing phlegm which is green in color.  She reports some right ear pain.  No change in hearing.  She is trying to recall what happened the past few days.  She is motivated to work with therapy.  4 point review of system is otherwise negative      Physical Exam   Vital Signs: Temp: 98.3  F (36.8  C) Temp src: Temporal BP: 129/78 Pulse: 80   Resp: 13 SpO2: 90 % O2 Device: (S) None (Room air) Oxygen Delivery: 1 LPM  Weight: 163 lbs 14.4 oz    General:  awake, calm, comfortable  Cardiac: Normal rate, regular rhythm  Respiratory: Equal air entry bilaterally, no wheezing  GI:  Abdomen soft, nontender, nondistended.  Neurologic: Alert and oriented x 3, intermittently forgetful.  Follows commands.  Moving all 4 extremities appropriately.      Medical Decision Making       53 MINUTES SPENT BY ME on the date of service doing chart review, history, exam, documentation & further activities per the note.      Data     I have personally reviewed the following data over the past 24 hrs:    13.2 (H)  \   10.1 (L)   / 112 (L)     143 100 25.5 (H) /  125 (H)   3.9 35 (H) 0.63 \             "

## 2024-02-06 NOTE — PLAN OF CARE
A&Ox4, though forgetful at times. Tele Afib CVR. Afebrile. Denies SOB. Weaned off O2 today. Sating at 88-89% on RA. Held metoprolol this morning with soft pressers, resolved this afternoon. Great UOP in El with Lasix. Ostomy changed by WOC. Updated pt and family at bedside of POC. See Flowsheet charting.     Goal Outcome Evaluation:      Plan of Care Reviewed With: patient, family    Overall Patient Progress: improvingOverall Patient Progress: improving    Outcome Evaluation: extubated yesterday, RA today - transfer to floor      Notified provider about indwelling el catheter discussed removal or continued need.    Did provider choose to remove indwelling el catheter? NO    Provider's el indication for keeping indwelling el catheter: Indication for continued use: Strict 1-2 Hour I & O if external catheters are not an option    Is there an order for indwelling el catheter? YES    *If there is a plan to keep el catheter in place at discharge daily notification with provider is not necessary, but please add a notation in the treatment team sticky note that the patient will be discharging with the catheter.

## 2024-02-06 NOTE — PROGRESS NOTES
"[facility]  WO Nurse Inpatient Assessment     Consulted for: Colostomy    Patient History (according to provider note(s):      Lara Melendez is a 58 year old female with a past medical history of COPD, atrial fibrillation, chronic anticoagulation with Eliquis, hypothyroidism, hypertension and anxiety with multiple recent admissions for COPD exacerbation who presents with shortness of breath, cough and wheezing.       Assessment:      Areas visualized during today's visit: Focused:    Assessment of established loop Colostomy:  Diagnosis Pertinent to Stoma: Bowel Obstruction      Surgery Date: 7/12/23  Surgeon:[facility]: [facility]  Pouching system in place on assessment today: Michael one piece, cut to fit, barrier ring, and no sting  Pouch barrier status: intact  Pouch last changed/wear time: 2-3 days  Reason for pouch change today: routine schedule  Effectiveness of current pouching/ supply plan: Effective  Change made with ostomy management today: No  Pouching system placed today: Michael one piece, cut to fit, flat, barrier ring, No Sting    Supplies: ordered pouches, rings  Last photo: 2/6/24    Stoma location: Upper midline  Stoma size: 1 3/4 inches, protrudes 5cm  Stoma appearance: pink-red and prolapsed  Mucocutaneous junction:  intact  Peristomal complication(s): Moisture-Associated Skin Damage (MASD) due to leakage-scant pin point reddened areas around ostomy  Output: thin and brown  Output volume emptied during visit: 25ml  Abdominal assessment: Soft    Face to face time: 20 minutes           Treatment Plan:     Midline Colostomy pouching plan:   Pouching system: ostomy supplies pouches: [place] FECAL ([id number])   Accessories used: [facility] ostomy accessories: 2\" Cera Barrier Ring ([id number]) and Cavilon no sting barrier film ([id number])   Frequency of pouch changes: Twice weekly  [facility] follow up plan: Weekly   Bedside RN interventions: Change pouch PRN if leaking using the " supplies above, Empty pouch when 1/3 to 1/2 full, ensure to clean pouch outlet after emptying to prevent odor, and Notify WO for ongoing pouch leakage      Orders: Updated    DATA:     Current support surface: Standard  Standard gel/foam mattress (IsoFlex, Atmos air, etc)  Containment of urine/stool: Colostomy pouch  BMI: Body mass index is 29.03 kg/m .   Active diet order: Orders Placed This Encounter      Combination Diet Full Liquid Diet; Mildly Thick (level 2) (ice chips OK; hold if any coughing/decline in respiratory status)     Output: I/O last 3 completed shifts:  In: 483.51 [I.V.:33.51; NG/GT:210]  Out: 3120 [Urine:2545; Stool:575]     Labs:   Recent Labs   Lab 02/06/24  0405   HGB 10.1*   WBC 13.2*     Pressure injury risk assessment:   Sensory Perception: 3-->slightly limited  Moisture: 3-->occasionally moist  Activity: 1-->bedfast  Mobility: 2-->very limited  Nutrition: 2-->probably inadequate  Friction and Shear: 2-->potential problem  Lee Score: 13    [facility] [employer]  Dept. Office Number: [phone]

## 2024-02-06 NOTE — PROGRESS NOTES
"Clinical Swallow Evaluation (CSE):     02/06/24 0830   Appointment Info   Signing Clinician's Name / Credentials (SLP) Shani Hill MS CCC-SLP   General Information   Onset of Illness/Injury or Date of Surgery 01/22/24   Referring Physician Dr. Hart   Patient/Family Therapy Goal Statement (SLP) To eat/drink ASAP   Pertinent History of Current Problem   Per provider \"Lara eMlendez is a 58 year old female with a past medical history of COPD, atrial fibrillation, chronic anticoagulation with Eliquis, hypothyroidism, hypertension and anxiety with multiple recent admissions for COPD exacerbation, but again for another COPD exacerbation, now intubated.  Regular attempts on weaning off mechanical ventilation.  Extubated on 2/5/2024.\" SLP for post-extubation swallow evaluation.     General Observations   Pt alert, pleasant, upright in bed, stable on 1 L NC. Mod hoarse vocal quality but 100% intelligible. Periodic cough - stronger compared to prior day, suction utilized for min amounts of secretion removal initially.     Pain Assessment   Patient Currently in Pain No   Type of Evaluation   Type of Evaluation Swallow Evaluation   Oral Motor   Oral Musculature generally intact   Structural Abnormalities none present   Mucosal Quality dry   Dentition (Oral Motor)   Dentition (Oral Motor) natural dentition   Facial Symmetry (Oral Motor)   Facial Symmetry (Oral Motor) WNL   Lip Function (Oral Motor)   Lip Range of Motion (Oral Motor) WNL   Tongue Function (Oral Motor)   Tongue Strength (Oral Motor) WNL   Tongue ROM (Oral Motor) WNL   Cough/Swallow/Gag Reflex (Oral Motor)   Soft Palate/Velum (Oral Motor) WNL   Volitional Throat Clear/Cough (Oral Motor) reduced strength   Volitional Swallow (Oral Motor) WNL   Vocal Quality/Secretion Management (Oral Motor)   Vocal Quality (Oral Motor) hoarse   General Swallowing Observations   Past History of Dysphagia SLP saw pt in 7/2023 after tracheostomy was capped: pt d/c'd on a regular/thin " diet. Pt denies any difficulty swallowing when asked.   Respiratory Support nasal cannula  (1L)   Current Diet/Method of Nutritional Intake (General Swallowing Observations, NIS) NPO   Swallowing Evaluation Clinical swallow evaluation   Clinical Swallow Evaluation   Feeding Assistance minimal assistance required  (some UE weakness noted)   Clinical Swallow Evaluation Textures Trialed thin liquids;mildly thick liquids;pureed   Clinical Swallow Eval: Thin Liquid Texture Trial   Mode of Presentation, Thin Liquids spoon;cup;straw   Volume of Liquid or Food Presented 2 oz   Oral Phase of Swallow premature pharyngeal entry   Pharyngeal Phase of Swallow impaired;reduction in laryngeal movement;coughing/choking   Diagnostic Statement ?mild oral holding and swallow delay; cough x2 with straw drinking   Clinical Swallow Eval: Mildly Thick Liquids   Mode of Presentation spoon;cup;straw   Volume Presented 4 oz   Oral Phase WFL   Pharyngeal Phase impaired;reduction in laryngeal movement   Diagnostic Statement ?mild oral holding and swallow delay though ?improved control compared to thin liquids; no overt clinical signs/sx aspiration noted   Clinical Swallow Evaluation: Puree Solid Texture Trial   Mode of Presentation, Puree spoon;fed by clinician   Volume of Puree Presented x4 bites   Oral Phase, Puree WFL   Pharyngeal Phase, Puree impaired;reduction in laryngeal movement   Diagnostic Statement no overt clinical signs/sx aspiration noted; pt denied sticking sensation when asked   Esophageal Phase of Swallow   Patient reports or presents with symptoms of esophageal dysphagia No   Swallowing Recommendations   Diet Consistency Recommendations full liquid diet;mildly thick liquids (level 2)   Supervision Level for Intake 1:1 supervision needed   Mode of Delivery Recommendations bolus size, small;slow rate of intake   Swallowing Maneuver Recommendations alternate food and liquid intake   Monitoring/Assistance Required  (Eating/Swallowing) stop eating activities when fatigue is present;monitor for cough or change in vocal quality with intake;optimize oral intake to minimize need for tube feeding   Recommended Feeding/Eating Techniques (Swallow Eval) maintain upright sitting position for eating;maintain upright posture during/after eating for 30 minutes;minimize distractions during oral intake;moisten oral mucosa prior to intake;provide assist with feeding;provide oral hygiene prior to intake   Medication Administration Recommendations, Swallowing (SLP) can try whole with puree, as tolerated; crush if needed   Instrumental Assessment Recommendations VFSS (videofluoroscopic swallowing study)  (anticipate pt will progress to regular/thin diet, though VFSS if concerns arise in the setting of prolonged intubation + COPD)   General Therapy Interventions   Planned Therapy Interventions Dysphagia Treatment   Clinical Impression   Criteria for Skilled Therapeutic Interventions Met (SLP Eval) Yes, treatment indicated   SLP Diagnosis mild oral and suspected pharyngeal dysphagia   Risks & Benefits of therapy have been explained evaluation/treatment results reviewed;care plan/treatment goals reviewed;risks/benefits reviewed;current/potential barriers reviewed;participants voiced agreement with care plan;participants included;patient   Clinical Impression Comments   Clinical swallow evaluation completed with thin liquids, mildly thick liquids, puree solids; pt currently presents with mild oral and suspected pharyngeal dysphagia in the setting of prolonged intubation. Oromotor exam largely WFL with only deficits in vocal quality (moderately hoarse - improving with PO) and mild reduced cough strength (also improving since yesterday with improved secretion clearance and management). WFL labial seal, oral containment across consistencies. Mild oral holding with delay in oral transit + swallow initiation across consistencies. Notable laryngeal  elevation to palpation. Cough x2 with thin via straw, no other overt clinical signs/sx aspiration noted.     Pt appropriate for cautious PO initiation and monitoring - anticipate pt will progress to regular/thin diet, though will complete VFSS if concerns arise in the setting of prolonged intubation + COPD.     SLP Total Evaluation Time   Eval: oral/pharyngeal swallow function, clinical swallow Minutes (81455) 9   SLP Goals   Therapy Frequency (SLP Eval) daily   SLP Predicted Duration/Target Date for Goal Attainment 02/13/24   SLP Goals Swallow   SLP: Safely tolerate diet without signs/symptoms of aspiration Regular diet;Thin liquids;With use of swallow precautions;Independently   Interventions   Interventions Quick Adds Swallowing Dysfunction   Swallowing Dysfunction &/or Oral Function for Feeding   Treatment of Swallowing Dysfunction &/or Oral Function for Feeding Minutes (03405)    Symptoms Noted During/After Treatment None   Treatment Detail/Skilled Intervention   Trained pt in safe swallow precautions and provided in depth education re: signs/sx dysphagia, apiration to monitor for with potential complications of each. Pt able to teach back signs, aware to notify staff if any concerns arise and verbalized understanding. Educated on current diet recs, SLP POC moving forward.     SLP Discharge Planning   SLP Plan PO tolerance, ADAT, ?VFSS if concerns   SLP Discharge Recommendation Transitional Care Facility   SLP Rationale for DC Rec Pt currently below baseline with swallow function + self-feeding and recommending TCU; anticipate pt will progress quickly and may be able to return home pending progress   SLP Brief overview of current status    Full liquids, mildly thick liquids with 1:1 supervision/assist (UE weakness, incoordination, close monitoring of tolerance) when fully alert + upright, slow rate with single bites/sips at a time, liquid wash every 1-3 bites. HOLD PO if any increased coughing, difficulty with  secretion management/increased suction needs or decline in respiratory status (pt stable on 1 L NC during eval).     Total Session Time   Total Session Time (sum of timed and untimed services) 18

## 2024-02-06 NOTE — PROGRESS NOTES
Pt weaned on PS / CPAP 7/5 for more than 2 hours and tolerated well. Suction for moderate thick secretions. Extubate pt at 11:15 am on 4 L Oxymask tolerated well. Will continue to monitor pt's respiratory status closely.  Vital signs:  Temp: 98.5  F (36.9  C) Temp src: Axillary BP: 103/60 Pulse: 78   Resp: 19 SpO2: 94 % O2 Device: (P) Nasal cannula Oxygen Delivery: 3 LPM     Idalia Sinclair RT, RT  2/5/2024 11: 30 AM

## 2024-02-06 NOTE — PLAN OF CARE
Problem: Enteral Nutrition  Goal: Absence of Aspiration Signs and Symptoms  Outcome: Adequate for Care Transition  Goal: Safe, Effective Therapy Delivery  Outcome: Adequate for Care Transition  Goal: Feeding Tolerance  Outcome: Adequate for Care Transition   Goal Outcome Evaluation: Pt extubated yesterday, EN access lost with no plan to replace. Discontinued EN orders.

## 2024-02-06 NOTE — PLAN OF CARE
ICU End of Shift Summary. For vital signs and complete assessments, please see documentation flowsheets.     Pertinent assessments: Pt oriented x3-4, orientation waxes and wanes with intermittent confusion. Fluctuated between afib and SR/SB on tele. Weaned to 1L O2 via NC. Pt with congested, weak cough- able to cough up some secretions this morning. Adequate UOP via el. Continues with redness/rash in perineal area- nystatin cream applied per orders and barrier cream PRN. Ostomy in place with 75mL output this shift. Pt kept NPO pending SLP eval.  Major Shift Events: pt slept most of the night  Plan (Upcoming Events): continue to monitor respiratory status, SLP eval   Discharge/Transfer Needs: TBD    Bedside Shift Report Completed: yes   Bedside Safety Check Completed: yes                Goal Outcome Evaluation:      Plan of Care Reviewed With: patient    Overall Patient Progress: improvingOverall Patient Progress: improving

## 2024-02-07 ENCOUNTER — APPOINTMENT (OUTPATIENT)
Dept: SPEECH THERAPY | Facility: CLINIC | Age: 59
End: 2024-02-07
Payer: COMMERCIAL

## 2024-02-07 ENCOUNTER — APPOINTMENT (OUTPATIENT)
Dept: OCCUPATIONAL THERAPY | Facility: CLINIC | Age: 59
End: 2024-02-07
Attending: STUDENT IN AN ORGANIZED HEALTH CARE EDUCATION/TRAINING PROGRAM
Payer: COMMERCIAL

## 2024-02-07 LAB
ANION GAP SERPL CALCULATED.3IONS-SCNC: 7 MMOL/L (ref 7–15)
BUN SERPL-MCNC: 27.6 MG/DL (ref 8–23)
CALCIUM SERPL-MCNC: 8.4 MG/DL (ref 8.8–10.2)
CHLORIDE SERPL-SCNC: 100 MMOL/L (ref 98–107)
CREAT SERPL-MCNC: 0.64 MG/DL (ref 0.51–0.95)
DEPRECATED HCO3 PLAS-SCNC: 34 MMOL/L (ref 22–29)
EGFRCR SERPLBLD CKD-EPI 2021: >90 ML/MIN/1.73M2
ERYTHROCYTE [DISTWIDTH] IN BLOOD BY AUTOMATED COUNT: 13.3 % (ref 10–15)
GLUCOSE BLDC GLUCOMTR-MCNC: 120 MG/DL (ref 70–99)
GLUCOSE SERPL-MCNC: 106 MG/DL (ref 70–99)
HCT VFR BLD AUTO: 31.7 % (ref 35–47)
HGB BLD-MCNC: 10.2 G/DL (ref 11.7–15.7)
MAGNESIUM SERPL-MCNC: 2.1 MG/DL (ref 1.7–2.3)
MCH RBC QN AUTO: 32.5 PG (ref 26.5–33)
MCHC RBC AUTO-ENTMCNC: 32.2 G/DL (ref 31.5–36.5)
MCV RBC AUTO: 101 FL (ref 78–100)
PHOSPHATE SERPL-MCNC: 3.1 MG/DL (ref 2.5–4.5)
PLATELET # BLD AUTO: 135 10E3/UL (ref 150–450)
POTASSIUM SERPL-SCNC: 3.5 MMOL/L (ref 3.4–5.3)
RBC # BLD AUTO: 3.14 10E6/UL (ref 3.8–5.2)
SODIUM SERPL-SCNC: 141 MMOL/L (ref 135–145)
WBC # BLD AUTO: 12.1 10E3/UL (ref 4–11)

## 2024-02-07 PROCEDURE — 250N000011 HC RX IP 250 OP 636: Performed by: INTERNAL MEDICINE

## 2024-02-07 PROCEDURE — 999N000156 HC STATISTIC RCP CONSULT EA 30 MIN

## 2024-02-07 PROCEDURE — 94640 AIRWAY INHALATION TREATMENT: CPT

## 2024-02-07 PROCEDURE — 250N000009 HC RX 250: Performed by: STUDENT IN AN ORGANIZED HEALTH CARE EDUCATION/TRAINING PROGRAM

## 2024-02-07 PROCEDURE — 250N000011 HC RX IP 250 OP 636: Performed by: STUDENT IN AN ORGANIZED HEALTH CARE EDUCATION/TRAINING PROGRAM

## 2024-02-07 PROCEDURE — 84100 ASSAY OF PHOSPHORUS: CPT | Performed by: STUDENT IN AN ORGANIZED HEALTH CARE EDUCATION/TRAINING PROGRAM

## 2024-02-07 PROCEDURE — 92526 ORAL FUNCTION THERAPY: CPT | Mod: GN

## 2024-02-07 PROCEDURE — 99233 SBSQ HOSP IP/OBS HIGH 50: CPT | Performed by: INTERNAL MEDICINE

## 2024-02-07 PROCEDURE — 120N000001 HC R&B MED SURG/OB

## 2024-02-07 PROCEDURE — 94640 AIRWAY INHALATION TREATMENT: CPT | Mod: 76

## 2024-02-07 PROCEDURE — 97530 THERAPEUTIC ACTIVITIES: CPT | Mod: GO | Performed by: REHABILITATION PRACTITIONER

## 2024-02-07 PROCEDURE — 250N000013 HC RX MED GY IP 250 OP 250 PS 637: Performed by: STUDENT IN AN ORGANIZED HEALTH CARE EDUCATION/TRAINING PROGRAM

## 2024-02-07 PROCEDURE — 97165 OT EVAL LOW COMPLEX 30 MIN: CPT | Mod: GO | Performed by: REHABILITATION PRACTITIONER

## 2024-02-07 PROCEDURE — 250N000013 HC RX MED GY IP 250 OP 250 PS 637: Performed by: INTERNAL MEDICINE

## 2024-02-07 PROCEDURE — 999N000157 HC STATISTIC RCP TIME EA 10 MIN

## 2024-02-07 PROCEDURE — 80048 BASIC METABOLIC PNL TOTAL CA: CPT | Performed by: STUDENT IN AN ORGANIZED HEALTH CARE EDUCATION/TRAINING PROGRAM

## 2024-02-07 PROCEDURE — 83735 ASSAY OF MAGNESIUM: CPT | Performed by: HOSPITALIST

## 2024-02-07 PROCEDURE — 250N000012 HC RX MED GY IP 250 OP 636 PS 637: Performed by: STUDENT IN AN ORGANIZED HEALTH CARE EDUCATION/TRAINING PROGRAM

## 2024-02-07 PROCEDURE — 250N000013 HC RX MED GY IP 250 OP 250 PS 637: Performed by: ANESTHESIOLOGY

## 2024-02-07 PROCEDURE — 250N000009 HC RX 250: Performed by: INTERNAL MEDICINE

## 2024-02-07 PROCEDURE — 85027 COMPLETE CBC AUTOMATED: CPT | Performed by: STUDENT IN AN ORGANIZED HEALTH CARE EDUCATION/TRAINING PROGRAM

## 2024-02-07 RX ORDER — ACETAMINOPHEN 500 MG
500 TABLET ORAL EVERY 4 HOURS PRN
Status: DISCONTINUED | OUTPATIENT
Start: 2024-02-07 | End: 2024-02-14 | Stop reason: HOSPADM

## 2024-02-07 RX ORDER — FLUTICASONE FUROATE AND VILANTEROL 200; 25 UG/1; UG/1
1 POWDER RESPIRATORY (INHALATION) DAILY
Status: DISCONTINUED | OUTPATIENT
Start: 2024-02-07 | End: 2024-02-14 | Stop reason: HOSPADM

## 2024-02-07 RX ORDER — GUAIFENESIN 200 MG/10ML
200 LIQUID ORAL EVERY 6 HOURS PRN
Status: DISCONTINUED | OUTPATIENT
Start: 2024-02-07 | End: 2024-02-14 | Stop reason: HOSPADM

## 2024-02-07 RX ORDER — FUROSEMIDE 10 MG/ML
20 INJECTION INTRAMUSCULAR; INTRAVENOUS EVERY 8 HOURS
Status: DISCONTINUED | OUTPATIENT
Start: 2024-02-07 | End: 2024-02-08

## 2024-02-07 RX ORDER — POTASSIUM CHLORIDE 1.5 G/1.58G
20 POWDER, FOR SOLUTION ORAL ONCE
Status: COMPLETED | OUTPATIENT
Start: 2024-02-07 | End: 2024-02-07

## 2024-02-07 RX ADMIN — AMIODARONE HYDROCHLORIDE 200 MG: 200 TABLET ORAL at 07:58

## 2024-02-07 RX ADMIN — METOPROLOL TARTRATE 37.5 MG: 25 TABLET, FILM COATED ORAL at 08:02

## 2024-02-07 RX ADMIN — NYSTATIN: 100000 CREAM TOPICAL at 21:54

## 2024-02-07 RX ADMIN — IPRATROPIUM BROMIDE AND ALBUTEROL SULFATE 3 ML: .5; 3 SOLUTION RESPIRATORY (INHALATION) at 11:55

## 2024-02-07 RX ADMIN — FUROSEMIDE 20 MG: 10 INJECTION, SOLUTION INTRAMUSCULAR; INTRAVENOUS at 12:12

## 2024-02-07 RX ADMIN — CLONAZEPAM 0.5 MG: 0.5 TABLET ORAL at 07:55

## 2024-02-07 RX ADMIN — IPRATROPIUM BROMIDE AND ALBUTEROL SULFATE 3 ML: .5; 3 SOLUTION RESPIRATORY (INHALATION) at 08:17

## 2024-02-07 RX ADMIN — APIXABAN 5 MG: 5 TABLET, FILM COATED ORAL at 08:02

## 2024-02-07 RX ADMIN — METOPROLOL TARTRATE 37.5 MG: 25 TABLET, FILM COATED ORAL at 21:37

## 2024-02-07 RX ADMIN — NYSTATIN: 100000 CREAM TOPICAL at 08:09

## 2024-02-07 RX ADMIN — IPRATROPIUM BROMIDE AND ALBUTEROL SULFATE 3 ML: .5; 3 SOLUTION RESPIRATORY (INHALATION) at 20:24

## 2024-02-07 RX ADMIN — FUROSEMIDE 20 MG: 10 INJECTION, SOLUTION INTRAMUSCULAR; INTRAVENOUS at 21:37

## 2024-02-07 RX ADMIN — FUROSEMIDE 20 MG: 10 INJECTION, SOLUTION INTRAMUSCULAR; INTRAVENOUS at 07:52

## 2024-02-07 RX ADMIN — APIXABAN 5 MG: 5 TABLET, FILM COATED ORAL at 21:37

## 2024-02-07 RX ADMIN — Medication 1 TABLET: at 08:01

## 2024-02-07 RX ADMIN — PREDNISONE 40 MG: 20 TABLET ORAL at 08:02

## 2024-02-07 RX ADMIN — ACETAMINOPHEN 500 MG: 500 TABLET ORAL at 15:31

## 2024-02-07 RX ADMIN — PANTOPRAZOLE SODIUM 40 MG: 40 TABLET, DELAYED RELEASE ORAL at 06:44

## 2024-02-07 RX ADMIN — ATORVASTATIN CALCIUM 20 MG: 20 TABLET, FILM COATED ORAL at 08:02

## 2024-02-07 RX ADMIN — ACETAMINOPHEN 500 MG: 325 SUSPENSION ORAL at 06:05

## 2024-02-07 RX ADMIN — ALBUTEROL SULFATE 2.5 MG: 2.5 SOLUTION RESPIRATORY (INHALATION) at 23:47

## 2024-02-07 RX ADMIN — PAROXETINE HYDROCHLORIDE 20 MG: 20 TABLET, FILM COATED ORAL at 08:02

## 2024-02-07 RX ADMIN — POTASSIUM CHLORIDE 20 MEQ: 1.5 POWDER, FOR SOLUTION ORAL at 08:21

## 2024-02-07 RX ADMIN — IPRATROPIUM BROMIDE AND ALBUTEROL SULFATE 3 ML: .5; 3 SOLUTION RESPIRATORY (INHALATION) at 15:59

## 2024-02-07 RX ADMIN — LEVOTHYROXINE SODIUM 112 MCG: 0.11 TABLET ORAL at 08:01

## 2024-02-07 ASSESSMENT — ACTIVITIES OF DAILY LIVING (ADL)
ADLS_ACUITY_SCORE: 39
ADLS_ACUITY_SCORE: 40
ADLS_ACUITY_SCORE: 36
ADLS_ACUITY_SCORE: 39
ADLS_ACUITY_SCORE: 43
ADLS_ACUITY_SCORE: 40
ADLS_ACUITY_SCORE: 39
ADLS_ACUITY_SCORE: 36
ADLS_ACUITY_SCORE: 39
ADLS_ACUITY_SCORE: 40

## 2024-02-07 NOTE — PLAN OF CARE
Goal Outcome Evaluation:      Plan of Care Reviewed With: patient    Overall Patient Progress: improvingOverall Patient Progress: improving         VSS on 1L NC. IV lasix given. Hines in place with good output. Full liquid diet with mildly thick liquids.  Tele afib CVR. Plan TBD, will continue with plan of care.

## 2024-02-07 NOTE — PROGRESS NOTES
CLINICAL NUTRITION SERVICES - REASSESSMENT NOTE    Recommendations Ordered by Registered Dietitian (RD):   Diet per SLP   Ordered Ensure Enlive PRN    Malnutrition:   % Weight Loss:  difficult to assess with fluid shifts  % Intake:  Decreased intake does not meet criteria for malnutrition - previously on EN   Subcutaneous Fat Loss: None observed - 2/2 RD note  Muscle Loss: None observed - 2/2 RD note  Fluid Retention:  trace    Malnutrition Diagnosis: Patient does not meet two of the above criteria necessary for diagnosing malnutrition     EVALUATION OF PROGRESS TOWARD GOALS   Diet: Regular Diet + Thin Liquids     Intake/Tolerance: SLP advanced diet yesterday to full liquids (mildly thick) - one meal documented at 25% consumed. Today is the first day of a regular diet - no information recorded in the flowsheets to comment on. Previously NPO, intubated and receiving EN. This was initiated on 1/28 and discontinued on 2/6 with extubation.     Patient transitioning out of the ICU and would like to watch the show on TV instead of speaking with the RD at this time. RN notes that she has been doing well with her regular diet.     ASSESSED NUTRITION NEEDS:  Dosing Weight: 66.4 kg, lowest documented wt this admission   Estimated Energy Needs:  9039-2269 kcals/day (25-30 kcals/kg)   Justification: maintenance   Estimated Protein Needs: 66-80 grams protein/day (1-1.2 grams of pro/kg)   Justification: maintenance   Estimated Fluid Needs: Per provider pending fluid status     NEW FINDINGS:   - extubated on 2/05  - SLP following   - WOC following for ostomy   - labs:  Labs:  Electrolytes  Potassium (mmol/L)   Date Value   02/07/2024 3.5   02/06/2024 3.9   02/05/2024 3.7   05/17/2022 4.5   05/16/2022 4.6   05/15/2022 4.1   02/02/2006 3.4   02/01/2006 3.5   01/31/2006 3.9     Potassium POCT (mmol/L)   Date Value   06/21/2023 5.1     Phosphorus (mg/dL)   Date Value   02/07/2024 3.1   02/06/2024 3.0   02/05/2024 2.6   02/04/2024 2.2  (L)   02/03/2024 2.6   01/30/2006 3.1    Blood Glucose  Glucose (mg/dL)   Date Value   02/07/2024 106 (H)   02/06/2024 123 (H)   05/17/2022 124 (H)   05/16/2022 138 (H)   05/15/2022 138 (H)   05/14/2022 120 (H)   05/14/2022 120 (H)   05/14/2022 118 (H)   02/02/2006 90   02/01/2006 90   01/31/2006 95   01/30/2006 106   01/30/2006 144 (H)     GLUCOSE BY METER POCT (mg/dL)   Date Value   02/07/2024 120 (H)   02/06/2024 125 (H)   02/06/2024 120 (H)   02/06/2024 113 (H)   02/06/2024 113 (H)     Hemoglobin A1C (%)   Date Value   07/21/2023 5.5   03/26/2023 4.9   12/01/2022 4.4    Inflammatory Markers  CRP Inflammation (mg/L)   Date Value   05/17/2022 <2.9   05/16/2022 <2.9   05/15/2022 3.6     WBC (10e9/L)   Date Value   02/02/2006 6.2   02/01/2006 7.7   01/31/2006 8.2     WBC Count (10e3/uL)   Date Value   02/07/2024 12.1 (H)   02/06/2024 13.2 (H)   02/05/2024 13.0 (H)     Albumin (g/dL)   Date Value   01/22/2024 4.4   01/09/2024 4.6   07/12/2023 2.8 (L)   05/15/2022 3.0 (L)   05/14/2022 3.5   05/14/2022 3.5   02/01/2006 2.3 (L)   01/30/2006 2.5 (L)   01/30/2006 2.9 (L)      Magnesium (mg/dL)   Date Value   02/07/2024 2.1   02/06/2024 2.2   02/05/2024 2.1   01/31/2006 2.0   01/31/2006 1.4 (L)   01/30/2006 2.6 (H)     Magnesium Laxative Screen (no units)   Date Value   01/30/2006 91.0     Sodium (mmol/L)   Date Value   02/07/2024 141   02/06/2024 143   02/05/2024 142   02/02/2006 133   02/01/2006 135   01/31/2006 130 (L)    Renal  Urea Nitrogen (mg/dL)   Date Value   02/07/2024 27.6 (H)   02/06/2024 25.5 (H)   02/05/2024 31.9 (H)   05/17/2022 17   05/16/2022 14   05/15/2022 10   02/02/2006 <2 (L)   02/01/2006 3 (L)   01/31/2006 4 (L)     UREA NITROGEN POCT (mg/dL)   Date Value   06/21/2023 7     Creatinine (mg/dL)   Date Value   02/07/2024 0.64   02/06/2024 0.63   02/05/2024 0.72   02/02/2006 0.80   02/01/2006 0.90   01/31/2006 0.90     Additional  Triglycerides (mg/dL)   Date Value   02/01/2024 69   07/08/2023 111    06/30/2023 97     Ketones Urine (mg/dL)   Date Value   06/28/2023 Negative   01/29/2006 Negative        - medications:   amiodarone  200 mg Oral or Feeding Tube Daily    apixaban ANTICOAGULANT  5 mg Oral or Feeding Tube BID    atorvastatin  20 mg Oral or Feeding Tube Daily    childrens multivitamin with iron  1 tablet Oral Daily    clonazePAM  0.5 mg Oral or Feeding Tube Daily    fluticasone-vilanterol  1 puff Inhalation Daily    furosemide  20 mg Intravenous Q8H    ipratropium - albuterol 0.5 mg/2.5 mg/3 mL  3 mL Nebulization 4x daily    levothyroxine  112 mcg Oral or Feeding Tube Daily    [Held by provider] losartan  25 mg Oral Daily    metoprolol tartrate  37.5 mg Per Feeding Tube BID    nystatin   Topical BID    pantoprazole  40 mg Oral QAM AC    PARoxetine  20 mg Oral or Feeding Tube Daily    predniSONE  40 mg Oral Daily    sodium chloride (PF)  3 mL Intracatheter Q8H       - MEDICATION INSTRUCTIONS -      - MEDICATION INSTRUCTIONS -      - MEDICATION INSTRUCTIONS -        acetaminophen, albuterol, albuterol, artificial tears, glucose **OR** dextrose **OR** glucagon, fentaNYL, guaiFENesin, hydrALAZINE, hydrOXYzine HCl, lidocaine 4%, lidocaine (buffered or not buffered), LORazepam, naloxone **OR** naloxone **OR** naloxone **OR** naloxone, nitroGLYcerin, - MEDICATION INSTRUCTIONS -, ondansetron **OR** ondansetron, - MEDICATION INSTRUCTIONS -, - MEDICATION INSTRUCTIONS -, prochlorperazine **OR** prochlorperazine **OR** prochlorperazine, senna-docusate **OR** senna-docusate, sodium chloride, sodium chloride (PF)     - weight: weight appears increased from admit weight - diuresed during admit. Difficult to determine true weight status   Vitals:    01/22/24 1833 01/27/24 0600 01/29/24 0430 01/30/24 0357   Weight: 67 kg (147 lb 9.6 oz) 67 kg (147 lb 11.3 oz) 66.4 kg (146 lb 6.2 oz) 66.8 kg (147 lb 4.3 oz)    01/31/24 0345 02/01/24 0415 02/02/24 0300 02/03/24 0600   Weight: 70.9 kg (156 lb 4.9 oz) 74.4 kg (164 lb 0.4  oz) 77.9 kg (171 lb 11.8 oz) 77.1 kg (170 lb)    02/04/24 0400 02/05/24 0600   Weight: 76 kg (167 lb 8 oz) 74.3 kg (163 lb 14.4 oz)     Previous Goals:   Total avg nutritional intake to meet a minimum of 20 kcal/kg and 1.2 g PRO/kg daily (per dosing wt 66.4 kg).   Evaluation: Met - while pt on EN     Previous Nutrition Diagnosis:   Inadequate oral intake related to respiratory needs necessitating NPO status as evidenced by need for enteral nutrition to meet needs    Evaluation: Improving    CURRENT NUTRITION DIAGNOSIS  Predicted inadequate nutrient intake related to potential for PO intake to decline pending clinical course     INTERVENTIONS  Recommendations / Nutrition Prescription  Diet per SLP   Ordered Ensure Enlive PRN     Implementation  Medical Food Supplement: as above   Collaboration and Referral of Nutrition care: discussed patient during interdisciplinary rounds this morning     Goals  Patient to consume >/=75% of meals ordered TID vs the equivalent in oral nutritional supplements      MONITORING AND EVALUATION:  Progress towards goals will be monitored and evaluated per protocol and Practice Guidelines    Milagro Pereira RD, LD  Clinical Dietitian     3rd floor/ICU: [phone]  All other floors: [phone]  Weekend/holiday: [phone]  Office: [phone]

## 2024-02-07 NOTE — PLAN OF CARE
Disoriented to Time sometimes situation. This morning with more anxiety then yesterday. 90% RA this morning. Drank a coffee her daughter brought. During OT O2 sats dropped into 70's. Now on 4 LPM NC. MDs aware of situation. OT recommend to stay bedrest today. Pt very anxious today after that and the coffee. Hyper fixated on her Colostomy, therapy, and how getting COPD was not her fault. Lost of calming methods utilized. Distraction and talking about her daughters helps calm her the most. Lasix adjusted. Great UOP in [place]. See Flowsheet charting. Report given to MS3 RN, transferred with all belongings.       Goal Outcome Evaluation:      Plan of Care Reviewed With: patient, family    Overall Patient Progress: improvingOverall Patient Progress: improving    Outcome Evaluation: trasnfered to floor

## 2024-02-07 NOTE — PROGRESS NOTES
"[facility] RCAT     Date: 02/07/24  Admission Dx: COPD exacerbation  Pulmonary History: COPD hx, Pulmonary nodule  Home Nebulizer/MDI Use: Albuterol MDI Q4 prn, Albuterol neb Q4 prn, Duoneb Q6 prn, Breo every day, Xopenex Q4 prn, Sodium Chloride BID  Home Oxygen: None  Acuity Level (RCAT flow sheet): 3  Aerosol Therapy initiated: Albuterol MDI Q4 prn, Albuterol Q4 prn, Breo every day, Duoneb QID, Sodium Chloride Q3 prn      Pulmonary Hygiene initiated: Coughing techniques      Volume Expansion initiated: Incentive Spirometry       Current Oxygen Requirements: 4 LPM NC  Current SpO2: 96%    Re-evaluation date: 02/10/24    Patient Education: Discussed use and benefits of respiratory medications.       See \"RT Assessments\" flow sheet for patient assessment scoring and Acuity Level Details.           "

## 2024-02-07 NOTE — PROGRESS NOTES
02/07/24 1113   Appointment Info   Signing Clinician's Name / Credentials (OT) Chyna Duffy OTR/L   Living Environment   People in Home spouse;child(ron), adult  (2 grandchildren)   Current Living Arrangements house   Home Accessibility stairs to enter home;stairs within home   Number of Stairs, Main Entrance 2   Number of Stairs, Within Home, Primary greater than 10 stairs   Transportation Anticipated family or friend will provide;car, drives self   Living Environment Comments pt is home alone during the day at baseline.  Does some care of grandchildren, works at home very occassionally on projects   Self-Care   Usual Activity Tolerance good   Current Activity Tolerance poor   Equipment Currently Used at Home none   Fall history within last six months no   Activity/Exercise/Self-Care Comment patient reports she was indp with ADls/IAdls prior to admission, not using an AD for mobility   General Information   Onset of Illness/Injury or Date of Surgery 01/26/24   Referring Physician Tammie Hart   Patient/Family Therapy Goal Statement (OT) patient excited to work with therapies to improve   Additional Occupational Profile Info/Pertinent History of Current Problem Lara Melendez is a 58 year old female with a past medical history of COPD, atrial fibrillation, chronic anticoagulation with Eliquis, hypothyroidism, hypertension and anxiety with multiple recent admissions for COPD exacerbation, but again for another COPD exacerbation, now intubated.  Regular attempts on weaning off mechanical ventilation.  Extubated on 2/5/2024. Presented with COPD exacerbation; has had frequent exacerbations requiring admission, with this being her third admission in 3 weeks.   General Observations and Info patient on RA, per RN if O2 sats drop past 88%, would need O2.   Cognitive Status Examination   Orientation Status orientation to person, place and time   Behavioral Issues overwhelmed easily   Affect/Mental Status (Cognitive) anxious    Follows Commands repetition of directions required   Safety Deficit impulsivity;judgment   Attention Deficit distractible in quiet environment   Executive Function Deficit insight/awareness of deficits;judgment   Visual Perception   Visual Impairment/Limitations corrective lenses full-time   Range of Motion Comprehensive   General Range of Motion bilateral upper extremity ROM WNL   Strength Comprehensive (MMT)   Comment, General Manual Muscle Testing (MMT) Assessment decreased overall strength   Muscle Tone Assessment   Muscle Tone Quick Adds No deficits were identified   Coordination   Coordination Comments decreased FMC noted with basic ADls   Bed Mobility   Comment (Bed Mobility) SBA sit in bed to sit EOB.   Transfers   Transfer Comments unable to complete   Balance   Balance Comments SBA for sitting EOB   Activities of Daily Living   BADL Assessment/Intervention toileting   Toileting   Comment, (Toileting) min A from RN staff to manage colostomy in bed.   Clinical Impression   Criteria for Skilled Therapeutic Interventions Met (OT) Yes, treatment indicated   OT Diagnosis decreased ADL/IADls   OT Problem List-Impairments impacting ADL activity tolerance impaired;strength;mobility;coordination   Assessment of Occupational Performance 5 or more Performance Deficits   Identified Performance Deficits dsg, toileting, bathing, functional/community mobility, IADls, driving, errands   Planned Therapy Interventions (OT) ADL retraining;progressive activity/exercise;transfer training;strengthening   Clinical Decision Making Complexity (OT) problem focused assessment/low complexity   Risk & Benefits of therapy have been explained evaluation/treatment results reviewed;care plan/treatment goals reviewed;participants included;risks/benefits reviewed;current/potential barriers reviewed;participants voiced agreement with care plan;patient   OT Total Evaluation Time   OT Eval, Low Complexity Minutes (91816) 15   OT Goals    Therapy Frequency (OT) Daily   OT Predicted Duration/Target Date for Goal Attainment 02/14/24   OT Goals Hygiene/Grooming;Upper Body Dressing;Lower Body Dressing;Toilet Transfer/Toileting;Aerobic Activity   OT: Hygiene/Grooming minimal assist   OT: Upper Body Dressing Supervision/stand-by assist   OT: Lower Body Dressing Minimal assist   OT: Toilet Transfer/Toileting Minimal assist  (bedside commode)   OT: Perform aerobic activity with stable cardiovascular response intermittent activity;10 minutes;ambulation  (B UE CVC's)   OT Discharge Planning   OT Plan g/h sitting EOB, dressing tasks, adv mobility and transfers   OT Discharge Recommendation (DC Rec) Transitional Care Facility   OT Rationale for DC Rec patient well below baseline for ADls and safe functional mobility   OT Brief overview of current status SBA sitting EOB. drop in O2 sats on RA with sitting EOB   Total Session Time   Total Session Time (sum of timed and untimed services) 15

## 2024-02-07 NOTE — PROGRESS NOTES
[facility]    Medicine Progress Note - Hospitalist Service    Date of Admission:  1/22/2024    Assessment & Plan   Lara Melendez is a 58 year old female with a past medical history of COPD, atrial fibrillation, chronic anticoagulation with Eliquis, hypothyroidism, hypertension and anxiety with multiple recent admissions for COPD exacerbation, but again for another COPD exacerbation, now intubated.  Regular attempts on weaning off mechanical ventilation.  Extubated on 2/5/2024.    Acute hypoxemic hypercarbic respiratory failure 2/2 severe COPD Exacerbation  Severe COPD  History of intubation with tracheostomy  Extubated 2/5/2024  Presented with COPD exacerbation; has had frequent exacerbations requiring admission, with this being her third admission in 3 weeks. She presented again with COPD exacerbation. she continued to experience air hunger and a feeling of anxiety/distress, despite seemingly adequate oxygenation and stable gases. She had received continuous nebs, nebs scheduled q2h, steroids, azithromycin, and benzos without relief. She went into AF with RVR  1/28 and was intubated for dyspnea, tachypnea. Really patient was not improving and thought about a potential tracheostomy. Patient was a started on IV diuresis, which worked really well for her. Extubated on 2/5/2024.  -PTA meds include breo and prn duonebs, no maintenance anticholinergic and not on any meds to reduce exacerbations.   -Seen by pulmonary. Appreciate input  -Initially given IV solumedrol and subsequently transitioned to oral prednisone. Continue p.o. prednisone.  -Will continue prednisone 40 mg burst for 4 days.   -Continue gentle diuresis IV lasix 20 mg BID. Will monitor I/O, daily weight , BMP  -Continue duonebs QID  -Currently on oxygen 1-2 liters/min. Will closely monitor oxygen saturation  -Incentive spirometry  -Will need follow up with pulmonary after discharge  -Will resume Breo today    Pulmonary  "recommendations for discharge:   -Begin LAMA inhaler at discharge such as Spiriva  -Consider adding chronic azithromycin or Roflumilast at discharge to reduce exacerbations  -Follow-up with pulmonology after discharge  -Consider steroid nebs rather than inhaler at discharge to increase pulmonary steroid delivery  -Consider evaluation for inflammatory/immune trigger (eosinophils, IgE) as an outpatient when off prednisone    Afib with RVR  -On both rate and rhythm control. On DOAC as well.  -Continue amiodarone 200 mg daily, need alternative agent as amiodarone not a good option in the long run given significant underlying COPD  -Continue metoprolol to 37.5 mg twice daily, with holding parameters. Patient may need down titration if continues to be bradycardic and/or hypotensive.  -Will monitor on telemetry     Leukocytosis  Acute microcytic anemia  Acute thrombocytopenia  Leukocytosis likely due to steroid use, downtrending.  Patient also noted to have acute anemia as well as thrombocytopenia, could be related to acute illness and frequent phlebotomies. Blood smear done which showed leukoerythroblastic reaction likely in the setting of acute stress.  - Continue to monitor CBC daily       Diet: Combination Diet Full Liquid Diet; Mildly Thick (level 2) (ice chips OK; hold if any coughing/decline in respiratory status)    DVT Prophylaxis: DOAC  [place] Catheter: PRESENT, indication: Strict 1-2 Hour I&O  Lines: PRESENT      PICC 01/28/24 Triple Lumen Left Brachial vein lateral-Site Assessment: WDL    Cardiac Monitoring: ACTIVE order. Indication: Tachyarrhythmias, acute (48 hours)  Code Status: Full Code      Clinically Significant Risk Factors                # Thrombocytopenia: Lowest platelets = 112 in last 2 days, will monitor for bleeding   # Hypertension: Noted on problem list        # Overweight: Estimated body mass index is 29.03 kg/m  as calculated from the following:    Height as of this encounter: 1.6 m (5' 3\").    " Weight as of this encounter: 74.3 kg (163 lb 14.4 oz).             Disposition Plan   Pending clinical improvement. Anticipate 2-3 days     Ceci Bowen MD, MD  Hospitalist Service  [facility]  Securely message with Gladys (more info)  Text page via [email or web address] Paging/Directory   ______________________________________________________________________  Interval history:  Patient seen and examined today. Chart reviewed. Patient gradually improving. She has no nausea or vomiting. She has no fever.     Physical Exam   Vital Signs: Temp: 97  F (36.1  C) Temp src: Temporal BP: (!) 146/100 Pulse: 59   Resp: 22 SpO2: 95 % O2 Device: None (Room air) Oxygen Delivery: 1 LPM  Weight: 163 lbs 14.4 oz    General:  awake, calm, comfortable  Cardiac: Normal rate, regular rhythm  Respiratory: Equal air entry bilaterally, no wheezing  GI:  Abdomen soft, nontender, nondistended.  Neurologic: Alert and oriented x 3, intermittently forgetful.  Follows commands.  Moving all 4 extremities appropriately.    Medical Decision Making       53 MINUTES SPENT BY ME on the date of service doing chart review, history, exam, documentation & further activities per the note.      Data     I have personally reviewed the following data over the past 24 hrs:    12.1 (H)  \   10.2 (L)   / 135 (L)     141 100 27.6 (H) /  106 (H)   3.5 34 (H) 0.64 \

## 2024-02-08 ENCOUNTER — APPOINTMENT (OUTPATIENT)
Dept: OCCUPATIONAL THERAPY | Facility: CLINIC | Age: 59
End: 2024-02-08
Payer: COMMERCIAL

## 2024-02-08 ENCOUNTER — APPOINTMENT (OUTPATIENT)
Dept: PHYSICAL THERAPY | Facility: CLINIC | Age: 59
End: 2024-02-08
Attending: STUDENT IN AN ORGANIZED HEALTH CARE EDUCATION/TRAINING PROGRAM
Payer: COMMERCIAL

## 2024-02-08 ENCOUNTER — APPOINTMENT (OUTPATIENT)
Dept: SPEECH THERAPY | Facility: CLINIC | Age: 59
End: 2024-02-08
Payer: COMMERCIAL

## 2024-02-08 LAB
ANION GAP SERPL CALCULATED.3IONS-SCNC: 9 MMOL/L (ref 7–15)
BUN SERPL-MCNC: 21.7 MG/DL (ref 8–23)
CALCIUM SERPL-MCNC: 8 MG/DL (ref 8.8–10.2)
CHLORIDE SERPL-SCNC: 94 MMOL/L (ref 98–107)
CREAT SERPL-MCNC: 0.75 MG/DL (ref 0.51–0.95)
DEPRECATED HCO3 PLAS-SCNC: 35 MMOL/L (ref 22–29)
EGFRCR SERPLBLD CKD-EPI 2021: >90 ML/MIN/1.73M2
ERYTHROCYTE [DISTWIDTH] IN BLOOD BY AUTOMATED COUNT: 13 % (ref 10–15)
GLUCOSE SERPL-MCNC: 89 MG/DL (ref 70–99)
HCT VFR BLD AUTO: 31.1 % (ref 35–47)
HGB BLD-MCNC: 10.5 G/DL (ref 11.7–15.7)
MAGNESIUM SERPL-MCNC: 1.7 MG/DL (ref 1.7–2.3)
MCH RBC QN AUTO: 33.1 PG (ref 26.5–33)
MCHC RBC AUTO-ENTMCNC: 33.8 G/DL (ref 31.5–36.5)
MCV RBC AUTO: 98 FL (ref 78–100)
PHOSPHATE SERPL-MCNC: 2 MG/DL (ref 2.5–4.5)
PLATELET # BLD AUTO: 142 10E3/UL (ref 150–450)
POTASSIUM SERPL-SCNC: 2.6 MMOL/L (ref 3.4–5.3)
POTASSIUM SERPL-SCNC: 3.3 MMOL/L (ref 3.4–5.3)
RBC # BLD AUTO: 3.17 10E6/UL (ref 3.8–5.2)
SODIUM SERPL-SCNC: 138 MMOL/L (ref 135–145)
WBC # BLD AUTO: 15.9 10E3/UL (ref 4–11)

## 2024-02-08 PROCEDURE — 80048 BASIC METABOLIC PNL TOTAL CA: CPT | Performed by: STUDENT IN AN ORGANIZED HEALTH CARE EDUCATION/TRAINING PROGRAM

## 2024-02-08 PROCEDURE — 250N000011 HC RX IP 250 OP 636: Performed by: INTERNAL MEDICINE

## 2024-02-08 PROCEDURE — 97161 PT EVAL LOW COMPLEX 20 MIN: CPT | Mod: GP | Performed by: PHYSICAL THERAPIST

## 2024-02-08 PROCEDURE — 250N000013 HC RX MED GY IP 250 OP 250 PS 637: Performed by: STUDENT IN AN ORGANIZED HEALTH CARE EDUCATION/TRAINING PROGRAM

## 2024-02-08 PROCEDURE — 97530 THERAPEUTIC ACTIVITIES: CPT | Mod: GO

## 2024-02-08 PROCEDURE — 250N000013 HC RX MED GY IP 250 OP 250 PS 637: Performed by: INTERNAL MEDICINE

## 2024-02-08 PROCEDURE — 84100 ASSAY OF PHOSPHORUS: CPT | Performed by: INTERNAL MEDICINE

## 2024-02-08 PROCEDURE — 250N000009 HC RX 250: Performed by: STUDENT IN AN ORGANIZED HEALTH CARE EDUCATION/TRAINING PROGRAM

## 2024-02-08 PROCEDURE — 92526 ORAL FUNCTION THERAPY: CPT | Mod: GN

## 2024-02-08 PROCEDURE — 94640 AIRWAY INHALATION TREATMENT: CPT

## 2024-02-08 PROCEDURE — 97530 THERAPEUTIC ACTIVITIES: CPT | Mod: GP | Performed by: PHYSICAL THERAPIST

## 2024-02-08 PROCEDURE — 250N000012 HC RX MED GY IP 250 OP 636 PS 637: Performed by: STUDENT IN AN ORGANIZED HEALTH CARE EDUCATION/TRAINING PROGRAM

## 2024-02-08 PROCEDURE — 94640 AIRWAY INHALATION TREATMENT: CPT | Mod: 76

## 2024-02-08 PROCEDURE — 250N000009 HC RX 250: Performed by: INTERNAL MEDICINE

## 2024-02-08 PROCEDURE — 999N000157 HC STATISTIC RCP TIME EA 10 MIN

## 2024-02-08 PROCEDURE — 258N000003 HC RX IP 258 OP 636: Performed by: INTERNAL MEDICINE

## 2024-02-08 PROCEDURE — 120N000001 HC R&B MED SURG/OB

## 2024-02-08 PROCEDURE — 85027 COMPLETE CBC AUTOMATED: CPT | Performed by: STUDENT IN AN ORGANIZED HEALTH CARE EDUCATION/TRAINING PROGRAM

## 2024-02-08 PROCEDURE — 84132 ASSAY OF SERUM POTASSIUM: CPT | Performed by: INTERNAL MEDICINE

## 2024-02-08 PROCEDURE — 99233 SBSQ HOSP IP/OBS HIGH 50: CPT | Performed by: INTERNAL MEDICINE

## 2024-02-08 PROCEDURE — 83735 ASSAY OF MAGNESIUM: CPT | Performed by: HOSPITALIST

## 2024-02-08 PROCEDURE — 999N000127 HC STATISTIC PERIPHERAL IV START W US GUIDANCE

## 2024-02-08 RX ORDER — MAGNESIUM OXIDE 400 MG/1
400 TABLET ORAL EVERY 4 HOURS
Status: COMPLETED | OUTPATIENT
Start: 2024-02-08 | End: 2024-02-08

## 2024-02-08 RX ORDER — POTASSIUM CHLORIDE 29.8 MG/ML
20 INJECTION INTRAVENOUS
Status: COMPLETED | OUTPATIENT
Start: 2024-02-08 | End: 2024-02-08

## 2024-02-08 RX ADMIN — MAGNESIUM OXIDE TAB 400 MG (241.3 MG ELEMENTAL MG) 400 MG: 400 (241.3 MG) TAB at 13:22

## 2024-02-08 RX ADMIN — APIXABAN 5 MG: 5 TABLET, FILM COATED ORAL at 20:53

## 2024-02-08 RX ADMIN — Medication 1 TABLET: at 09:25

## 2024-02-08 RX ADMIN — IPRATROPIUM BROMIDE AND ALBUTEROL SULFATE 3 ML: .5; 3 SOLUTION RESPIRATORY (INHALATION) at 11:56

## 2024-02-08 RX ADMIN — IPRATROPIUM BROMIDE AND ALBUTEROL SULFATE 3 ML: .5; 3 SOLUTION RESPIRATORY (INHALATION) at 07:58

## 2024-02-08 RX ADMIN — POTASSIUM CHLORIDE 20 MEQ: 400 INJECTION, SOLUTION INTRAVENOUS at 10:30

## 2024-02-08 RX ADMIN — AMIODARONE HYDROCHLORIDE 200 MG: 200 TABLET ORAL at 09:25

## 2024-02-08 RX ADMIN — NYSTATIN: 100000 CREAM TOPICAL at 20:54

## 2024-02-08 RX ADMIN — ACETAMINOPHEN 500 MG: 500 TABLET ORAL at 01:11

## 2024-02-08 RX ADMIN — PREDNISONE 40 MG: 20 TABLET ORAL at 09:25

## 2024-02-08 RX ADMIN — HYDROXYZINE HYDROCHLORIDE 10 MG: 10 TABLET ORAL at 01:09

## 2024-02-08 RX ADMIN — LEVOTHYROXINE SODIUM 112 MCG: 0.11 TABLET ORAL at 09:25

## 2024-02-08 RX ADMIN — IPRATROPIUM BROMIDE AND ALBUTEROL SULFATE 3 ML: .5; 3 SOLUTION RESPIRATORY (INHALATION) at 19:38

## 2024-02-08 RX ADMIN — MAGNESIUM OXIDE TAB 400 MG (241.3 MG ELEMENTAL MG) 400 MG: 400 (241.3 MG) TAB at 09:25

## 2024-02-08 RX ADMIN — CLONAZEPAM 0.5 MG: 0.5 TABLET ORAL at 09:26

## 2024-02-08 RX ADMIN — PAROXETINE HYDROCHLORIDE 20 MG: 20 TABLET, FILM COATED ORAL at 09:25

## 2024-02-08 RX ADMIN — POTASSIUM CHLORIDE 20 MEQ: 400 INJECTION, SOLUTION INTRAVENOUS at 07:56

## 2024-02-08 RX ADMIN — ATORVASTATIN CALCIUM 20 MG: 20 TABLET, FILM COATED ORAL at 09:26

## 2024-02-08 RX ADMIN — ACETAMINOPHEN 500 MG: 500 TABLET ORAL at 09:53

## 2024-02-08 RX ADMIN — PANTOPRAZOLE SODIUM 40 MG: 40 TABLET, DELAYED RELEASE ORAL at 09:25

## 2024-02-08 RX ADMIN — SODIUM PHOSPHATE, MONOBASIC, MONOHYDRATE AND SODIUM PHOSPHATE, DIBASIC, ANHYDROUS 9 MMOL: 142; 276 INJECTION, SOLUTION INTRAVENOUS at 13:22

## 2024-02-08 RX ADMIN — POTASSIUM CHLORIDE 20 MEQ: 400 INJECTION, SOLUTION INTRAVENOUS at 09:24

## 2024-02-08 RX ADMIN — FUROSEMIDE 20 MG: 10 INJECTION, SOLUTION INTRAMUSCULAR; INTRAVENOUS at 04:51

## 2024-02-08 RX ADMIN — NYSTATIN: 100000 CREAM TOPICAL at 09:27

## 2024-02-08 RX ADMIN — APIXABAN 5 MG: 5 TABLET, FILM COATED ORAL at 09:26

## 2024-02-08 RX ADMIN — IPRATROPIUM BROMIDE AND ALBUTEROL SULFATE 3 ML: .5; 3 SOLUTION RESPIRATORY (INHALATION) at 16:46

## 2024-02-08 RX ADMIN — FLUTICASONE FUROATE AND VILANTEROL 1 PUFF: 200; 25 POWDER RESPIRATORY (INHALATION) at 07:58

## 2024-02-08 ASSESSMENT — ACTIVITIES OF DAILY LIVING (ADL)
ADLS_ACUITY_SCORE: 37
ADLS_ACUITY_SCORE: 37
ADLS_ACUITY_SCORE: 43
ADLS_ACUITY_SCORE: 43
ADLS_ACUITY_SCORE: 37
ADLS_ACUITY_SCORE: 37
ADLS_ACUITY_SCORE: 41
ADLS_ACUITY_SCORE: 41
ADLS_ACUITY_SCORE: 43
ADLS_ACUITY_SCORE: 42

## 2024-02-08 NOTE — PROGRESS NOTES
"   02/08/24 1515   Appointment Info   Signing Clinician's Name / Credentials (PT) Naomy Clifton, PT   Rehab Comments (PT) colostomy bag   Living Environment   People in Home spouse;child(ron), adult  (grandchildren)   Current Living Arrangements house   Home Accessibility stairs to enter home;stairs within home   Number of Stairs, Main Entrance 2   Stair Railings, Main Entrance none   Number of Stairs, Within Home, Primary greater than 10 stairs   Stair Railings, Within Home, Primary railings safe and in good condition   Transportation Anticipated family or friend will provide;car, drives self   Self-Care   Usual Activity Tolerance moderate   Current Activity Tolerance poor   Equipment Currently Used at Home none   Fall history within last six months no   Activity/Exercise/Self-Care Comment normally not using an assistive device   General Information   Onset of Illness/Injury or Date of Surgery 01/22/24   Referring Physician Tammie Hart MD   Patient/Family Therapy Goals Statement (PT) return home   Pertinent History of Current Problem (include personal factors and/or comorbidities that impact the POC) per chart: \"Lara Melendez is a 58 year old female with a past medical history of COPD, atrial fibrillation, chronic anticoagulation with Eliquis, hypothyroidism, hypertension and anxiety with multiple recent admissions for COPD exacerbation, but again for another COPD exacerbation, now intubated.  Regular attempts on weaning off mechanical ventilation.  Extubated on 2/5/2024. Presented with COPD exacerbation; has had frequent exacerbations requiring admission, with this being her third admission in 3 weeks.\" ; see medical record for further information   Existing Precautions/Restrictions fall;oxygen therapy device and L/min   General Observations Patient in bed; agreeable to session   Cognition   Follows Commands (Cognition) 75-90% accuracy   Safety Deficit (Cognition) insight into deficits/self-awareness;ability to " follow commands   Cognitive Status Comments appears to have decreased insight into deficits impairing her ability to return directly home   Pain Assessment   Patient Currently in Pain No   Integumentary/Edema   Integumentary/Edema Comments see nursing notes   Posture    Posture Comments forward flexed   Range of Motion (ROM)   ROM Comment UE's appear functional; LE's appear functional   Strength (Manual Muscle Testing)   Strength Comments significant weakness noted functionally with bed mobility and transfers   Bed Mobility   Comment, (Bed Mobility) min/mod A and heavy use of bedrail   Transfers   Comment, (Transfers) sit>stand with max A x 2 and use of Aaliyah Stedy   Gait/Stairs (Locomotion)   Comment, (Gait/Stairs) currently unable   Balance   Balance Comments decreased sitting balance until feet closer to floor; impaired standing balance   Clinical Impression   Criteria for Skilled Therapeutic Intervention Yes, treatment indicated   PT Diagnosis (PT) impaired functional mobility   Influenced by the following impairments functional weakness; decreased activity tolerance; impaired balance   Functional limitations due to impairments impaired independence with mobility and cares secondary to above deficits   Clinical Presentation (PT Evaluation Complexity) evolving   Clinical Presentation Rationale clinical judgement; level of assist   Clinical Decision Making (Complexity) low complexity   Planned Therapy Interventions (PT) balance training;bed mobility training;gait training;strengthening;stair training;transfer training;progressive activity/exercise   Risk & Benefits of therapy have been explained evaluation/treatment results reviewed;care plan/treatment goals reviewed;risks/benefits reviewed;current/potential barriers reviewed;patient   PT Total Evaluation Time   PT Eval, Low Complexity Minutes (06303) 10   Physical Therapy Goals   PT Frequency 5x/week   PT Predicted Duration/Target Date for Goal Attainment 02/15/24    PT Goals Bed Mobility;Transfers;Gait;Stairs   PT: Bed Mobility Independent;Supine to/from sit;Rolling   PT: Transfers Minimal assist;Sit to/from stand;Bed to/from chair;Assistive device   PT: Gait Minimal assist;Rolling walker;50 feet   PT: Stairs Minimal assist;2 stairs   PT Discharge Planning   PT Discharge Recommendation (DC Rec) Transitional Care Facility   PT Rationale for DC Rec Patient significantly below reported baseline level of function and currently needing a Aaliyah Stedy to come to standing and transfer secondary to weakness and poor activity tolerance; Would benefit from ongoing PT in hospital and at TCU to maximize return to PLOF; currently not safe to return home without stretcher transport into home, a lift device, and 2 person assist   PT Brief overview of current status A x 2 with Aaliyah Stedy   PT Equipment Needed at Discharge lift device   Total Session Time   Total Session Time (sum of timed and untimed services) 10

## 2024-02-08 NOTE — PLAN OF CARE
"Goal Outcome Evaluation:      Plan of Care Reviewed With: patient    Overall Patient Progress: improvingOverall Patient Progress: improving       /55 (BP Location: Left arm)   Pulse 73   Temp 100  F (37.8  C) (Oral)   Resp 18   Ht 1.6 m (5' 3\")   Wt 75.4 kg (166 lb 3.6 oz)   SpO2 94%   BMI 29.45 kg/m       Intermittent confusion. Kept thinking it was time to get for the day. Expressed some anxiety and endorsed a 9/10 headache - improved with Tylenol and Atarax. CHG bath complete. 4 L nasal cannula. +1 edema in lower extremities. Presence of colostomy - draining liquid stool. Appliance is to be changed today. Presence of a el catheter. Some mild itching in vaginal area - Nystatin cream applied with relief. Generalized bruising. A2. Not OOB this shift.   "

## 2024-02-08 NOTE — PROGRESS NOTES
Group Therapy Documentation    PATIENT'S NAME: Nikole Castro  MRN:   [id number]  :   2007  ACCT. NUMBER: [id number]  DATE OF SERVICE: 21  START TIME:  9:30 AM  END TIME: 10:30 AM  FACILITATOR(S): Bina Dominguez TH  TOPIC: Child/Adol Group Therapy  Number of patients attending the group:  6  Group Length:  1 Hours    Summary of Group / Topics Discussed:    Art Therapy Overview: Art Therapy engages patients in the creative process of art-making using a wide variety of art media. These groups are facilitated by a trained/credentialed art therapist, responsible for providing a safe, therapeutic, and non-threatening environment that elicits the patient's capacity for art-making. The use of art media, creative process, and the subsequent product enhance the patient's physical, mental, and emotional well-being by helping to achieve therapeutic goals. Art Therapy helps patients to control impulses, manage behavior, focus attention, encourage the safe expression of feelings, reduce anxiety, improve reality orientation, reconcile emotional conflicts, foster self-awareness, improve social skills, develop new coping strategies, and build self-esteem.    Open Studio:     Objective(s):    To allow patients to explore a variety of art media appropriate to their clinical presentation    Avoid resistance to art therapy treatment and therapeutic process by engaging client in areas of personal interest    Give patients a visual voice, to express and contain difficult emotions in a safe way when words may not be enough    Research supports that the act of creating artwork significantly increases positive affect, reduces negative affect, and improves    self efficacy (Jostin & Elias, 2016)    To process the artwork by following the creative process with an open discussion       Group Attendance:  Attended group session    Patient's response to the group topic/interactions:  cooperative with task and listened  Neuro: A&Ox3. Disoriented to situation, tangential thoughts at times.   Cardiac: SR. VSS.   Respiratory: Sating >88% on 4L NC.   GI/: Adequate urine output. No BM.   Diet/appetite: Tolerating Regulat diet. Appetite poor/fair.   Activity:  Assist of 2 bedfast this since arrival on MS3 (see prior notes).  Pain: At acceptable level on current regimen.   Skin: No new deficits noted.  LDA's:R PIV-SL. L PICC-SL. Hines. Colostomy.     Plan: Tylenol X1 for headache with relief. Encouraged pulmonary hygiene and activity. Encouraged PO intake. Updated care plan.    actively    Patient appeared to be Actively participating, Attentive and Engaged.       Client specific details:  Pt engaged in the group activity and worked on a creative game. Pt was cooperative and worked collaboratively with peers.

## 2024-02-08 NOTE — PLAN OF CARE
Neuro: A&Ox3, disoriented to situation and tangental thought process.   Cardiac: SR. VSS.   Respiratory: Sating >88% on 4L on RA.  GI/: Adequate urine output. Small BM X1  Diet/appetite: Tolerating Regular diet. Eating fair.   Activity:  Assist of 2 with lift, sat edge of bed for 1 hr.  Pain: At acceptable level on current regimen. Tylenol X1.    Skin: No new deficits noted.  LDA's: L PICC 3L Red-TKO, White-SL. Grey-SL. Colostomy.     Plan: Hines Dc'd at 1300. BP soft, Lasix Dc'd lasix dc'd by MD. K replaced 60meq IV, recheck pending. Mag and phos replaced, rechecks 2/9. Colostomy pouch changed. Orders placed to remove PICC, vascular at bedside placing PIV at this time. Encouraged PO intake and activity. Updated care plan.

## 2024-02-08 NOTE — PROGRESS NOTES
[facility]  Hospitalist Progress Note  Tommie Hilliard MD 02/08/2024    Reason for Stay (Diagnosis): resp failure         Assessment and Plan:      Summary of Stay: Lara Melendze is a  58 year old female with a past medical history of COPD, atrial fibrillation, chronic anticoagulation with Eliquis, hypothyroidism, hypertension and anxiety with multiple recent admissions for COPD exacerbation, but again for another COPD exacerbation, now intubated.  Regular attempts on weaning off mechanical ventilation.  Extubated on 2/5/2024.  Transferred out of ICU to the medical floor    Continues to require supplemental oxygen.  Declines TCU on discharge    Plans today:  -Remove PICC line  -Remove Hines catheter  -Mobilize with rehabilitation therapy  -Wean supplemental oxygen as able.  Normally does not use at home  -Supplement potassium  - stop IV Lasix  -Patient declines TCU recommendation on discharge  -I updated the patient's daughter Danica at bedside today         Acute hypoxemic hypercarbic respiratory failure 2/2 severe COPD Exacerbation  Severe COPD  History of intubation with tracheostomy  Extubated 2/5/2024  Presented with COPD exacerbation; has had frequent exacerbations requiring admission, with this being her third admission in 3 weeks. She presented again with COPD exacerbation. she continued to experience air hunger and a feeling of anxiety/distress, despite seemingly adequate oxygenation and stable gases. She had received continuous nebs, nebs scheduled q2h, steroids, azithromycin, and benzos without relief. She went into AF with RVR  1/28 and was intubated for dyspnea, tachypnea. Really patient was not improving and thought about a potential tracheostomy. Patient was a started on IV diuresis, which worked really well for her. Extubated on 2/5/2024.  -PTA meds include breo and prn duonebs, no maintenance anticholinergic and not on any meds to reduce exacerbations.   -Seen by pulmonary.  Appreciate input  -Initially given IV solumedrol and subsequently transitioned to oral prednisone.   -continued prednisone 40 mg burst for 4 days (completed on 2/8).   -completed diuresis with IV lasix 20 mg BID (stopped on 2/8)  -Continue duonebs QID  -Currently on oxygen 4 liters/min.  Wean oxygen as able.  She reports she has an oxygen tank at home for emergencies, but does not normally use it  -Incentive spirometry  -Will need follow up with pulmonary after discharge.  Patient reports she already has a pulmonologist follow-up scheduled in April       Pulmonary recommendations for discharge:   -Begin LAMA inhaler at discharge such as Spiriva  -Follow-up with pulmonology after discharge (pt already sees a pulmonologist as outpatient and has appt scheduled in April)  -Consider steroid nebs rather than inhaler at discharge to increase pulmonary steroid delivery  -Consider evaluation for inflammatory/immune trigger (eosinophils, IgE) as an outpatient when off prednisone     Afib with RVR  -On both rate and rhythm control. On DOAC as well.  -Continue amiodarone 200 mg daily, need alternative agent as amiodarone not a good option in the long run given significant underlying COPD  -Continue metoprolol to 37.5 mg twice daily, with holding parameters. Patient may need down titration if continues to be bradycardic and/or hypotensive.  -Will monitor on telemetry      Leukocytosis  Acute microcytic anemia  Acute thrombocytopenia  Leukocytosis likely due to steroid use, downtrending.  Patient also noted to have acute anemia as well as thrombocytopenia, could be related to acute illness and frequent phlebotomies. Blood smear done which showed leukoerythroblastic reaction likely in the setting of acute stress.  - Continue to monitor CBC daily    Hypokalemia  -Due to diuresis  -Continue potassium supplementation protocol    Generalized weakness/deconditioning  -Patient evaluated by rehabilitation therapy  -TCU  "recommended  -Patient declining TCU.  Patient's daughter Danica was made aware of this during her visit on 2/8  -Anticipate discharge home when meets criteria listed below           Diet: Combination Diet Full Liquid Diet; Mildly Thick (level 2) (ice chips OK; hold if any coughing/decline in respiratory status)    DVT Prophylaxis: DOAC  Hines Catheter: PRESENT, indication: Strict 1-2 Hour I&O  Lines: PRESENT      PICC 01/28/24 Triple Lumen Left Brachial vein lateral-Site Assessment: WDL    Cardiac Monitoring: ACTIVE order. Indication: Tachyarrhythmias, acute (48 hours)  Code Status: Full Code    DISPO: TCU has been recommended, but the patient declines this wants to go home.  I reviewed this with the patient's daughter Danica on 2/8 and she is aware.  Another 2-4 days in the hospital is likely, with discharge occurring after supplemental oxygen is weaned, and patient able to adequately mobilize in the hallways without significant dyspnea.          Interval History (Subjective):      Dyspnea slowly improving daily.  Has an oxygen tank at home which she uses if she feels short of breath, but does not normally use on daily basis.  not currently on chronic prednisone.  Has a pulmonologist with follow-up scheduled in April.  Attempting to have her spouse not smoke around her going forward.                  Physical Exam:      Last Vital Signs:  BP 97/50   Pulse 64   Temp 98.1  F (36.7  C) (Oral)   Resp 16   Ht 1.6 m (5' 3\")   Wt 72 kg (158 lb 11.7 oz)   SpO2 92%   BMI 28.12 kg/m        Intake/Output Summary (Last 24 hours) at 2/8/2024 1207  Last data filed at 2/8/2024 1030  Gross per 24 hour   Intake 1150 ml   Output 2925 ml   Net -1775 ml       Constitutional: Awake, alert, cooperative, no apparent distress     Respiratory: Clear to auscultation bilaterally, no crackles or wheezing   Cardiovascular: Regular rate and rhythm, normal S1 and S2, and no murmur noted   Abdomen: Normal bowel sounds, soft, non-distended, " non-tender   Skin: No rashes, no cyanosis, dry to touch   Neuro: Alert and oriented x3, no weakness, numbness, memory loss   Extremities: No edema, normal range of motion   Other(s):        All other systems: Negative          Medications:      All current medications were reviewed with changes reflected in problem list.         Data:      All new lab and imaging data was reviewed.   Labs:       Lab Results   Component Value Date     02/08/2024     02/07/2024     02/06/2024     02/02/2006     02/01/2006     01/31/2006    Lab Results   Component Value Date    CHLORIDE 94 02/08/2024    CHLORIDE 100 02/07/2024    CHLORIDE 100 02/06/2024    CHLORIDE 99 06/21/2023    CHLORIDE 103 05/17/2022    CHLORIDE 106 05/16/2022    CHLORIDE 107 05/15/2022    CHLORIDE 109 02/02/2006    CHLORIDE 107 02/01/2006    CHLORIDE 100 01/31/2006    Lab Results   Component Value Date    BUN 21.7 02/08/2024    BUN 27.6 02/07/2024    BUN 25.5 02/06/2024    BUN 7 06/21/2023    BUN 17 05/17/2022    BUN 14 05/16/2022    BUN 10 05/15/2022    BUN <2 02/02/2006    BUN 3 02/01/2006    BUN 4 01/31/2006      Lab Results   Component Value Date    POTASSIUM 2.6 02/08/2024    POTASSIUM 3.5 02/07/2024    POTASSIUM 3.9 02/06/2024    POTASSIUM 5.1 06/21/2023    POTASSIUM 4.5 05/17/2022    POTASSIUM 4.6 05/16/2022    POTASSIUM 4.1 05/15/2022    POTASSIUM 3.4 02/02/2006    POTASSIUM 3.5 02/01/2006    POTASSIUM 3.9 01/31/2006    Lab Results   Component Value Date    CO2 35 02/08/2024    CO2 34 02/07/2024    CO2 35 02/06/2024    CO2 29 05/17/2022    CO2 31 05/16/2022    CO2 24 05/15/2022    CO2 17 02/02/2006    CO2 23 02/01/2006    CO2 26 01/31/2006    Lab Results   Component Value Date    CR 0.75 02/08/2024    CR 0.64 02/07/2024    CR 0.63 02/06/2024    CR 0.80 02/02/2006    CR 0.90 02/01/2006    CR 0.90 01/31/2006        Recent Labs   Lab 02/08/24  0630   WBC 15.9*   HGB 10.5*   HCT 31.1*   MCV 98   *      Imaging:   No  results found for this or any previous visit (from the past 24 hour(s)).

## 2024-02-08 NOTE — PROGRESS NOTES
Care Management Follow Up    Length of Stay (days): 17    Expected Discharge Date: 02/10/2024     Concerns to be Addressed: all concerns addressed in this encounter       Anticipated Discharge Disposition: Home care vs TCU     Anticipated Discharge Services: None  Anticipated Discharge DME:      Patient/family educated on Medicare website which has current facility and service quality ratings:    Education Provided on the Discharge Plan: Yes  Patient/Family in Agreement with the Plan:      Referrals Placed by CM/SW:        Additional Information:  OT recs TCU at discharge, first therapy session was yesterday. CM met with pt at the bedside who shared that she had a prior bad experience at a TCU and would prefer to discharge home with home care if possible. She has no home care agency preference. She is still open to discussing TCU but wants to keep working with therapy and see how that goes. Bedside RN to reach out to therapy today to determine when they plan to work with her again. SNF packet left at bedside. CM will continue to follow therapy recs.     Malena Hernandez, RN, BSN  Inpatient Care Coordination  [facility]  [phone]
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
## 2024-02-09 ENCOUNTER — APPOINTMENT (OUTPATIENT)
Dept: SPEECH THERAPY | Facility: CLINIC | Age: 59
End: 2024-02-09
Payer: COMMERCIAL

## 2024-02-09 ENCOUNTER — APPOINTMENT (OUTPATIENT)
Dept: PHYSICAL THERAPY | Facility: CLINIC | Age: 59
End: 2024-02-09
Payer: COMMERCIAL

## 2024-02-09 ENCOUNTER — APPOINTMENT (OUTPATIENT)
Dept: OCCUPATIONAL THERAPY | Facility: CLINIC | Age: 59
End: 2024-02-09
Payer: COMMERCIAL

## 2024-02-09 LAB
ANION GAP SERPL CALCULATED.3IONS-SCNC: 11 MMOL/L (ref 7–15)
BUN SERPL-MCNC: 16.1 MG/DL (ref 8–23)
CALCIUM SERPL-MCNC: 7.8 MG/DL (ref 8.8–10.2)
CHLORIDE SERPL-SCNC: 97 MMOL/L (ref 98–107)
CREAT SERPL-MCNC: 0.77 MG/DL (ref 0.51–0.95)
DEPRECATED HCO3 PLAS-SCNC: 30 MMOL/L (ref 22–29)
EGFRCR SERPLBLD CKD-EPI 2021: 88 ML/MIN/1.73M2
GLUCOSE SERPL-MCNC: 86 MG/DL (ref 70–99)
MAGNESIUM SERPL-MCNC: 1.7 MG/DL (ref 1.7–2.3)
PHOSPHATE SERPL-MCNC: 1.6 MG/DL (ref 2.5–4.5)
PHOSPHATE SERPL-MCNC: 1.8 MG/DL (ref 2.5–4.5)
POTASSIUM SERPL-SCNC: 2.8 MMOL/L (ref 3.4–5.3)
POTASSIUM SERPL-SCNC: 3.6 MMOL/L (ref 3.4–5.3)
SODIUM SERPL-SCNC: 138 MMOL/L (ref 135–145)

## 2024-02-09 PROCEDURE — 84100 ASSAY OF PHOSPHORUS: CPT | Performed by: INTERNAL MEDICINE

## 2024-02-09 PROCEDURE — 250N000013 HC RX MED GY IP 250 OP 250 PS 637: Performed by: STUDENT IN AN ORGANIZED HEALTH CARE EDUCATION/TRAINING PROGRAM

## 2024-02-09 PROCEDURE — 84132 ASSAY OF SERUM POTASSIUM: CPT | Performed by: INTERNAL MEDICINE

## 2024-02-09 PROCEDURE — 999N000157 HC STATISTIC RCP TIME EA 10 MIN

## 2024-02-09 PROCEDURE — 94640 AIRWAY INHALATION TREATMENT: CPT | Mod: 76

## 2024-02-09 PROCEDURE — 92526 ORAL FUNCTION THERAPY: CPT | Mod: GN

## 2024-02-09 PROCEDURE — 83735 ASSAY OF MAGNESIUM: CPT | Performed by: HOSPITALIST

## 2024-02-09 PROCEDURE — 250N000013 HC RX MED GY IP 250 OP 250 PS 637: Performed by: INTERNAL MEDICINE

## 2024-02-09 PROCEDURE — 99233 SBSQ HOSP IP/OBS HIGH 50: CPT | Performed by: INTERNAL MEDICINE

## 2024-02-09 PROCEDURE — 250N000009 HC RX 250: Performed by: STUDENT IN AN ORGANIZED HEALTH CARE EDUCATION/TRAINING PROGRAM

## 2024-02-09 PROCEDURE — 80048 BASIC METABOLIC PNL TOTAL CA: CPT | Performed by: INTERNAL MEDICINE

## 2024-02-09 PROCEDURE — 97535 SELF CARE MNGMENT TRAINING: CPT | Mod: GO

## 2024-02-09 PROCEDURE — 97530 THERAPEUTIC ACTIVITIES: CPT | Mod: GP | Performed by: PHYSICAL THERAPIST

## 2024-02-09 PROCEDURE — 120N000001 HC R&B MED SURG/OB

## 2024-02-09 PROCEDURE — 94640 AIRWAY INHALATION TREATMENT: CPT

## 2024-02-09 PROCEDURE — 36415 COLL VENOUS BLD VENIPUNCTURE: CPT | Performed by: INTERNAL MEDICINE

## 2024-02-09 RX ORDER — POTASSIUM CHLORIDE 1500 MG/1
20 TABLET, EXTENDED RELEASE ORAL 2 TIMES DAILY
Status: DISCONTINUED | OUTPATIENT
Start: 2024-02-09 | End: 2024-02-10

## 2024-02-09 RX ORDER — POTASSIUM CHLORIDE 1500 MG/1
40 TABLET, EXTENDED RELEASE ORAL ONCE
Status: COMPLETED | OUTPATIENT
Start: 2024-02-09 | End: 2024-02-09

## 2024-02-09 RX ORDER — MAGNESIUM OXIDE 400 MG/1
400 TABLET ORAL EVERY 4 HOURS
Status: COMPLETED | OUTPATIENT
Start: 2024-02-09 | End: 2024-02-09

## 2024-02-09 RX ORDER — FLUCONAZOLE 150 MG/1
150 TABLET ORAL ONCE
Status: COMPLETED | OUTPATIENT
Start: 2024-02-09 | End: 2024-02-09

## 2024-02-09 RX ORDER — POTASSIUM CHLORIDE 1500 MG/1
20 TABLET, EXTENDED RELEASE ORAL ONCE
Status: COMPLETED | OUTPATIENT
Start: 2024-02-09 | End: 2024-02-09

## 2024-02-09 RX ADMIN — POTASSIUM & SODIUM PHOSPHATES POWDER PACK 280-160-250 MG 2 PACKET: 280-160-250 PACK at 10:32

## 2024-02-09 RX ADMIN — PANTOPRAZOLE SODIUM 40 MG: 40 TABLET, DELAYED RELEASE ORAL at 08:30

## 2024-02-09 RX ADMIN — IPRATROPIUM BROMIDE AND ALBUTEROL SULFATE 3 ML: .5; 3 SOLUTION RESPIRATORY (INHALATION) at 20:23

## 2024-02-09 RX ADMIN — POTASSIUM & SODIUM PHOSPHATES POWDER PACK 280-160-250 MG 2 PACKET: 280-160-250 PACK at 14:35

## 2024-02-09 RX ADMIN — APIXABAN 5 MG: 5 TABLET, FILM COATED ORAL at 20:18

## 2024-02-09 RX ADMIN — IPRATROPIUM BROMIDE AND ALBUTEROL SULFATE 3 ML: .5; 3 SOLUTION RESPIRATORY (INHALATION) at 07:44

## 2024-02-09 RX ADMIN — LEVOTHYROXINE SODIUM 112 MCG: 0.11 TABLET ORAL at 08:30

## 2024-02-09 RX ADMIN — CLONAZEPAM 0.5 MG: 0.5 TABLET ORAL at 08:25

## 2024-02-09 RX ADMIN — POTASSIUM CHLORIDE 40 MEQ: 1500 TABLET, EXTENDED RELEASE ORAL at 10:33

## 2024-02-09 RX ADMIN — AMIODARONE HYDROCHLORIDE 200 MG: 200 TABLET ORAL at 08:30

## 2024-02-09 RX ADMIN — METOPROLOL TARTRATE 37.5 MG: 25 TABLET, FILM COATED ORAL at 20:17

## 2024-02-09 RX ADMIN — ACETAMINOPHEN 500 MG: 500 TABLET ORAL at 12:18

## 2024-02-09 RX ADMIN — METOPROLOL TARTRATE 37.5 MG: 25 TABLET, FILM COATED ORAL at 08:29

## 2024-02-09 RX ADMIN — IPRATROPIUM BROMIDE AND ALBUTEROL SULFATE 3 ML: .5; 3 SOLUTION RESPIRATORY (INHALATION) at 12:02

## 2024-02-09 RX ADMIN — IPRATROPIUM BROMIDE AND ALBUTEROL SULFATE 3 ML: .5; 3 SOLUTION RESPIRATORY (INHALATION) at 16:02

## 2024-02-09 RX ADMIN — FLUTICASONE FUROATE AND VILANTEROL 1 PUFF: 200; 25 POWDER RESPIRATORY (INHALATION) at 07:43

## 2024-02-09 RX ADMIN — PAROXETINE HYDROCHLORIDE 20 MG: 20 TABLET, FILM COATED ORAL at 08:30

## 2024-02-09 RX ADMIN — MAGNESIUM OXIDE TAB 400 MG (241.3 MG ELEMENTAL MG) 400 MG: 400 (241.3 MG) TAB at 10:33

## 2024-02-09 RX ADMIN — POTASSIUM CHLORIDE 20 MEQ: 1500 TABLET, EXTENDED RELEASE ORAL at 20:17

## 2024-02-09 RX ADMIN — APIXABAN 5 MG: 5 TABLET, FILM COATED ORAL at 08:30

## 2024-02-09 RX ADMIN — ATORVASTATIN CALCIUM 20 MG: 20 TABLET, FILM COATED ORAL at 08:30

## 2024-02-09 RX ADMIN — Medication 1 TABLET: at 08:24

## 2024-02-09 RX ADMIN — POTASSIUM CHLORIDE 20 MEQ: 1500 TABLET, EXTENDED RELEASE ORAL at 12:18

## 2024-02-09 RX ADMIN — MAGNESIUM OXIDE TAB 400 MG (241.3 MG ELEMENTAL MG) 400 MG: 400 (241.3 MG) TAB at 14:35

## 2024-02-09 ASSESSMENT — ACTIVITIES OF DAILY LIVING (ADL)
ADLS_ACUITY_SCORE: 37
ADLS_ACUITY_SCORE: 36
ADLS_ACUITY_SCORE: 36
ADLS_ACUITY_SCORE: 37

## 2024-02-09 NOTE — PROGRESS NOTES
[facility]  Hospitalist Progress Note  Tommie Hilliard MD 02/09/2024    Reason for Stay (Diagnosis): COPD exacerbation         Assessment and Plan:      Summary of Stay: Lara Melendez is a  58 year old female with a past medical history of COPD, atrial fibrillation, chronic anticoagulation with Eliquis, hypothyroidism, hypertension and anxiety with multiple recent admissions for COPD exacerbation, but again for another COPD exacerbation, now intubated.  Regular attempts on weaning off mechanical ventilation.  Extubated on 2/5/2024.  Transferred out of ICU to the medical floor     Continues to require supplemental oxygen.  Declines TCU on discharge.  Patient required a Aaliyah steady to get out of bed and mobilize yesterday     Plans today:  -Start scheduled potassium chloride 20 mill colons twice daily, in addition to as needed potassium supplement protocol for persistent hypokalemia  - repeat BMP in AM  -Continue to mobilize with rehabilitation therapy  -Wean supplemental oxygen as able.  Normally does not use at home  -Supplement potassium  -Patient continues to decline TCU recommendation on discharge    Addendum;  pt having sx c/w vaginal yeast infection.  Will administer fluconazole 150 mg PO x 1 dose        Acute hypoxemic hypercarbic respiratory failure 2/2 severe COPD Exacerbation  Severe COPD  History of intubation with tracheostomy  Chemical intubation required this admission, extubated 2/5/2024  Presented with COPD exacerbation; has had frequent exacerbations requiring admission, with this being her third admission in 3 weeks. She presented again with COPD exacerbation. she continued to experience air hunger and a feeling of anxiety/distress, despite seemingly adequate oxygenation and stable gases. She had received continuous nebs, nebs scheduled q2h, steroids, azithromycin, and benzos without relief. She went into AF with RVR  1/28 and was intubated for dyspnea, tachypnea. Really  patient was not improving and thought about a potential tracheostomy. Patient was a started on IV diuresis, which worked really well for her. Extubated on 2/5/2024.  -PTA meds include breo and prn duonebs, no maintenance anticholinergic and not on any meds to reduce exacerbations.   -Seen by pulmonary. Appreciate input  -Initially given IV solumedrol and subsequently transitioned to oral prednisone.   -continued prednisone 40 mg burst for 4 days (completed on 2/8).   -completed diuresis with IV lasix 20 mg BID (stopped on 2/8)  -Continue duonebs QID  -Currently on oxygen 4 liters/min.  Wean oxygen as able.  She reports she has an oxygen tank at home for emergencies, but does not normally use it  -Incentive spirometry  -Will need follow up with pulmonary after discharge.  Patient reports she already has a pulmonologist follow-up scheduled in April        Pulmonary recommendations for discharge:   -Begin LAMA inhaler at discharge such as Spiriva  -Follow-up with pulmonology after discharge (pt already sees a pulmonologist as outpatient and has appt scheduled in April)  -Consider steroid nebs rather than inhaler at discharge to increase pulmonary steroid delivery  -Consider evaluation for inflammatory/immune trigger (eosinophils, IgE) as an outpatient when off prednisone     Afib with RVR  -On both rate and rhythm control. On DOAC as well.  -Continue amiodarone 200 mg daily, need alternative agent as amiodarone not a good option in the long run given significant underlying COPD  -Continue metoprolol to 37.5 mg twice daily, with holding parameters. Patient may need down titration if continues to be bradycardic and/or hypotensive.  -Will monitor on telemetry      Leukocytosis  Acute microcytic anemia  Acute thrombocytopenia  Leukocytosis likely due to steroid use, downtrending.  Patient also noted to have acute anemia as well as thrombocytopenia, could be related to acute illness and frequent phlebotomies. Blood smear  "done which showed leukoerythroblastic reaction likely in the setting of acute stress.  - Continue to monitor CBC daily     Hypokalemia  -Due to diuresis  -Continue potassium supplementation protocol     Generalized weakness/deconditioning  -Patient evaluated by rehabilitation therapy  -TCU recommended  -Patient declining TCU.  Patient's daughter Danica was made aware of this during her visit on 2/8  -Anticipate discharge home when meets criteria listed below           Diet: Combination Diet Full Liquid Diet; Mildly Thick (level 2) (ice chips OK; hold if any coughing/decline in respiratory status)    DVT Prophylaxis: DOAC  Hines Catheter: PRESENT, indication: Strict 1-2 Hour I&O  Lines: PRESENT      PICC 01/28/24 Triple Lumen Left Brachial vein lateral-Site Assessment: WDL    Cardiac Monitoring: ACTIVE order. Indication: Tachyarrhythmias, acute (48 hours)  Code Status: Full Code    DISPO: TCU has been recommended, but the patient declines this wants to go home.  I reviewed this with the patient's daughter Danica on 2/8 and she is aware.  Another 2-4 days in the hospital is likely, with discharge occurring after supplemental oxygen is weaned, and patient able to adequately mobilize in the hallways without significant dyspnea.          Interval History (Subjective):      Remains on supplemental oxygen at 4 L via nasal cannula.  Feels her breathing is a bit better today.  Required a Aaliyah steady to get up and mobilize yesterday.  Continues to decline TCU recommendation                  Physical Exam:      Last Vital Signs:  /52 (BP Location: Left arm)   Pulse 69   Temp 98  F (36.7  C) (Oral)   Resp 18   Ht 1.6 m (5' 3\")   Wt 72 kg (158 lb 11.7 oz)   SpO2 90%   BMI 28.12 kg/m        Intake/Output Summary (Last 24 hours) at 2/9/2024 1015  Last data filed at 2/9/2024 0452  Gross per 24 hour   Intake 350 ml   Output 750 ml   Net -400 ml       Constitutional: Awake, alert, cooperative, no apparent distress "     Respiratory: Clear to auscultation bilaterally, no crackles or wheezing   Cardiovascular: Regular rate and rhythm, normal S1 and S2, and no murmur noted   Abdomen: Normal bowel sounds, soft, non-distended, non-tender   Skin: No rashes, no cyanosis, dry to touch   Neuro: Alert and oriented x3, no weakness, numbness, memory loss   Extremities: No edema, normal range of motion   Other(s):        All other systems: Negative          Medications:      All current medications were reviewed with changes reflected in problem list.         Data:      All new lab and imaging data was reviewed.   Labs:       Lab Results   Component Value Date     02/09/2024     02/08/2024     02/07/2024     02/02/2006     02/01/2006     01/31/2006    Lab Results   Component Value Date    CHLORIDE 97 02/09/2024    CHLORIDE 94 02/08/2024    CHLORIDE 100 02/07/2024    CHLORIDE 99 06/21/2023    CHLORIDE 103 05/17/2022    CHLORIDE 106 05/16/2022    CHLORIDE 107 05/15/2022    CHLORIDE 109 02/02/2006    CHLORIDE 107 02/01/2006    CHLORIDE 100 01/31/2006    Lab Results   Component Value Date    BUN 16.1 02/09/2024    BUN 21.7 02/08/2024    BUN 27.6 02/07/2024    BUN 7 06/21/2023    BUN 17 05/17/2022    BUN 14 05/16/2022    BUN 10 05/15/2022    BUN <2 02/02/2006    BUN 3 02/01/2006    BUN 4 01/31/2006      Lab Results   Component Value Date    POTASSIUM 2.8 02/09/2024    POTASSIUM 3.3 02/08/2024    POTASSIUM 2.6 02/08/2024    POTASSIUM 5.1 06/21/2023    POTASSIUM 4.5 05/17/2022    POTASSIUM 4.6 05/16/2022    POTASSIUM 4.1 05/15/2022    POTASSIUM 3.4 02/02/2006    POTASSIUM 3.5 02/01/2006    POTASSIUM 3.9 01/31/2006    Lab Results   Component Value Date    CO2 30 02/09/2024    CO2 35 02/08/2024    CO2 34 02/07/2024    CO2 29 05/17/2022    CO2 31 05/16/2022    CO2 24 05/15/2022    CO2 17 02/02/2006    CO2 23 02/01/2006    CO2 26 01/31/2006    Lab Results   Component Value Date    CR 0.77 02/09/2024    CR 0.75  02/08/2024    CR 0.64 02/07/2024    CR 0.80 02/02/2006    CR 0.90 02/01/2006    CR 0.90 01/31/2006        Recent Labs   Lab 02/08/24  0630   WBC 15.9*   HGB 10.5*   HCT 31.1*   MCV 98   *      Imaging:   No results found for this or any previous visit (from the past 24 hour(s)).

## 2024-02-09 NOTE — PROGRESS NOTES
Speech Language Therapy Discharge Summary    Reason for therapy discharge:    All goals and outcomes met, no further needs identified.    Progress towards therapy goal(s). See goals on Care Plan in Epic electronic health record for goal details.  Goals met    Therapy recommendation(s):    No further therapy is recommended.    Recommend a regular texture diet and thin liquids. Recommend caregivers encourage the patient sit upright for PO, take small bites/sips at a slow pace.

## 2024-02-09 NOTE — PLAN OF CARE
Goal Outcome Evaluation:      Plan of Care Reviewed With: patient    Overall Patient Progress: improving    A&OX4. Forgetful. LS coarse/diminished. Oxygen 96% on 4L NC. SOB with activities. Up with assist of 1 with gait belt and walker. Colostomy intact. Neb treatment 4 times daily. PICC removed by vascular access and placed PIV. +1 edema to BLE. Reg diet . Continue POC and monitoring.

## 2024-02-09 NOTE — PROGRESS NOTES
Pt alert and oriented with intermittent confusion.VSS. Oxygen sating above 92% on 4L NC.tried weaning but was fluctuating below 90%. PIV -SL.  No el. Colostomy present.

## 2024-02-09 NOTE — PROGRESS NOTES
Care Management Follow Up    Length of Stay (days): 18    Expected Discharge Date: 02/10/2024     Concerns to be Addressed: all concerns addressed in this encounter     Patient plan of care discussed at interdisciplinary rounds: Yes    Anticipated Discharge Disposition: Home Care, Home     Anticipated Discharge Services: None  Anticipated Discharge DME:      Patient/family educated on Medicare website which has current facility and service quality ratings: Yes   Education Provided on the Discharge Plan: Ye  Patient/Family in Agreement with the Plan: yes    Referrals Placed by CM/SW: Homecare    Additional Information:  Met with pt to discuss discharge planning. PT and OT are recommending TCU as pt is requiring a Aaliyah Steady for transfers. Pt adamantly refusing TCU as she had a very bad experience in the past. Pt would like a referral sent to Home Care for RN PT OT HHA. Pt does not have a Home Care agency preference. Referral sent in via the [facility].  Did speak with spouse, he is supportive of pt's decision to decline TCU.    ADDENDUM: [facility] has accepted pt for RN PT OT visits. Her insurance will not cover HHA visits. Policy covers up to 80 RN PT OT visits per calendar year.    Will continue to follow for discharge planning.    Marla Antony RN   Inpatient Care Coordination  [facility]   Phone: [phone]

## 2024-02-10 ENCOUNTER — APPOINTMENT (OUTPATIENT)
Dept: OCCUPATIONAL THERAPY | Facility: CLINIC | Age: 59
End: 2024-02-10
Payer: COMMERCIAL

## 2024-02-10 LAB
ANION GAP SERPL CALCULATED.3IONS-SCNC: 7 MMOL/L (ref 7–15)
BUN SERPL-MCNC: 10 MG/DL (ref 8–23)
CALCIUM SERPL-MCNC: 7.7 MG/DL (ref 8.8–10.2)
CHLORIDE SERPL-SCNC: 99 MMOL/L (ref 98–107)
CREAT SERPL-MCNC: 0.76 MG/DL (ref 0.51–0.95)
DEPRECATED HCO3 PLAS-SCNC: 29 MMOL/L (ref 22–29)
EGFRCR SERPLBLD CKD-EPI 2021: 89 ML/MIN/1.73M2
GLUCOSE SERPL-MCNC: 81 MG/DL (ref 70–99)
MAGNESIUM SERPL-MCNC: 1.8 MG/DL (ref 1.7–2.3)
PHOSPHATE SERPL-MCNC: 2.3 MG/DL (ref 2.5–4.5)
POTASSIUM SERPL-SCNC: 3.4 MMOL/L (ref 3.4–5.3)
POTASSIUM SERPL-SCNC: 3.8 MMOL/L (ref 3.4–5.3)
SODIUM SERPL-SCNC: 135 MMOL/L (ref 135–145)

## 2024-02-10 PROCEDURE — 250N000013 HC RX MED GY IP 250 OP 250 PS 637: Performed by: STUDENT IN AN ORGANIZED HEALTH CARE EDUCATION/TRAINING PROGRAM

## 2024-02-10 PROCEDURE — 94640 AIRWAY INHALATION TREATMENT: CPT | Mod: 76

## 2024-02-10 PROCEDURE — 36415 COLL VENOUS BLD VENIPUNCTURE: CPT | Performed by: INTERNAL MEDICINE

## 2024-02-10 PROCEDURE — 83735 ASSAY OF MAGNESIUM: CPT | Performed by: HOSPITALIST

## 2024-02-10 PROCEDURE — 999N000127 HC STATISTIC PERIPHERAL IV START W US GUIDANCE

## 2024-02-10 PROCEDURE — 250N000013 HC RX MED GY IP 250 OP 250 PS 637: Performed by: INTERNAL MEDICINE

## 2024-02-10 PROCEDURE — 97110 THERAPEUTIC EXERCISES: CPT | Mod: GO | Performed by: REHABILITATION PRACTITIONER

## 2024-02-10 PROCEDURE — 250N000009 HC RX 250: Performed by: STUDENT IN AN ORGANIZED HEALTH CARE EDUCATION/TRAINING PROGRAM

## 2024-02-10 PROCEDURE — 250N000009 HC RX 250: Performed by: INTERNAL MEDICINE

## 2024-02-10 PROCEDURE — 258N000003 HC RX IP 258 OP 636: Performed by: INTERNAL MEDICINE

## 2024-02-10 PROCEDURE — 999N000157 HC STATISTIC RCP TIME EA 10 MIN

## 2024-02-10 PROCEDURE — 94640 AIRWAY INHALATION TREATMENT: CPT

## 2024-02-10 PROCEDURE — 80048 BASIC METABOLIC PNL TOTAL CA: CPT | Performed by: INTERNAL MEDICINE

## 2024-02-10 PROCEDURE — 84100 ASSAY OF PHOSPHORUS: CPT | Performed by: INTERNAL MEDICINE

## 2024-02-10 PROCEDURE — 84132 ASSAY OF SERUM POTASSIUM: CPT | Performed by: INTERNAL MEDICINE

## 2024-02-10 PROCEDURE — 120N000001 HC R&B MED SURG/OB

## 2024-02-10 PROCEDURE — 999N000156 HC STATISTIC RCP CONSULT EA 30 MIN

## 2024-02-10 PROCEDURE — 250N000011 HC RX IP 250 OP 636: Performed by: INTERNAL MEDICINE

## 2024-02-10 PROCEDURE — 99232 SBSQ HOSP IP/OBS MODERATE 35: CPT | Performed by: INTERNAL MEDICINE

## 2024-02-10 RX ORDER — POTASSIUM CHLORIDE 20MEQ/15ML
20 LIQUID (ML) ORAL 2 TIMES DAILY
Status: DISCONTINUED | OUTPATIENT
Start: 2024-02-10 | End: 2024-02-11

## 2024-02-10 RX ORDER — POTASSIUM CHLORIDE 20MEQ/15ML
20 LIQUID (ML) ORAL ONCE
Status: COMPLETED | OUTPATIENT
Start: 2024-02-10 | End: 2024-02-10

## 2024-02-10 RX ORDER — MAGNESIUM OXIDE 400 MG/1
400 TABLET ORAL EVERY 4 HOURS
Status: DISCONTINUED | OUTPATIENT
Start: 2024-02-10 | End: 2024-02-10 | Stop reason: DRUGHIGH

## 2024-02-10 RX ORDER — POTASSIUM CHLORIDE 1500 MG/1
20 TABLET, EXTENDED RELEASE ORAL 2 TIMES DAILY
Status: DISCONTINUED | OUTPATIENT
Start: 2024-02-10 | End: 2024-02-10

## 2024-02-10 RX ORDER — POTASSIUM CHLORIDE 7.45 MG/ML
10 INJECTION INTRAVENOUS
Status: COMPLETED | OUTPATIENT
Start: 2024-02-10 | End: 2024-02-10

## 2024-02-10 RX ORDER — POTASSIUM CHLORIDE 1500 MG/1
40 TABLET, EXTENDED RELEASE ORAL ONCE
Status: DISCONTINUED | OUTPATIENT
Start: 2024-02-10 | End: 2024-02-10 | Stop reason: DRUGHIGH

## 2024-02-10 RX ORDER — MAGNESIUM SULFATE HEPTAHYDRATE 40 MG/ML
2 INJECTION, SOLUTION INTRAVENOUS ONCE
Status: COMPLETED | OUTPATIENT
Start: 2024-02-10 | End: 2024-02-10

## 2024-02-10 RX ADMIN — PANTOPRAZOLE SODIUM 40 MG: 40 TABLET, DELAYED RELEASE ORAL at 09:54

## 2024-02-10 RX ADMIN — POTASSIUM CHLORIDE 10 MEQ: 7.46 INJECTION, SOLUTION INTRAVENOUS at 12:10

## 2024-02-10 RX ADMIN — LEVOTHYROXINE SODIUM 112 MCG: 0.11 TABLET ORAL at 09:55

## 2024-02-10 RX ADMIN — IPRATROPIUM BROMIDE AND ALBUTEROL SULFATE 3 ML: .5; 3 SOLUTION RESPIRATORY (INHALATION) at 16:54

## 2024-02-10 RX ADMIN — SODIUM PHOSPHATE, MONOBASIC, MONOHYDRATE AND SODIUM PHOSPHATE, DIBASIC, ANHYDROUS 9 MMOL: 142; 276 INJECTION, SOLUTION INTRAVENOUS at 17:16

## 2024-02-10 RX ADMIN — MAGNESIUM SULFATE HEPTAHYDRATE 2 G: 2 INJECTION, SOLUTION INTRAVENOUS at 10:45

## 2024-02-10 RX ADMIN — FLUTICASONE FUROATE AND VILANTEROL 1 PUFF: 200; 25 POWDER RESPIRATORY (INHALATION) at 08:18

## 2024-02-10 RX ADMIN — IPRATROPIUM BROMIDE AND ALBUTEROL SULFATE 3 ML: .5; 3 SOLUTION RESPIRATORY (INHALATION) at 20:08

## 2024-02-10 RX ADMIN — AMIODARONE HYDROCHLORIDE 200 MG: 200 TABLET ORAL at 09:54

## 2024-02-10 RX ADMIN — APIXABAN 5 MG: 5 TABLET, FILM COATED ORAL at 09:54

## 2024-02-10 RX ADMIN — ATORVASTATIN CALCIUM 20 MG: 20 TABLET, FILM COATED ORAL at 09:54

## 2024-02-10 RX ADMIN — ACETAMINOPHEN 500 MG: 500 TABLET ORAL at 20:06

## 2024-02-10 RX ADMIN — Medication 1 TABLET: at 09:55

## 2024-02-10 RX ADMIN — IPRATROPIUM BROMIDE AND ALBUTEROL SULFATE 3 ML: .5; 3 SOLUTION RESPIRATORY (INHALATION) at 08:17

## 2024-02-10 RX ADMIN — METOPROLOL TARTRATE 37.5 MG: 25 TABLET, FILM COATED ORAL at 20:33

## 2024-02-10 RX ADMIN — IPRATROPIUM BROMIDE AND ALBUTEROL SULFATE 3 ML: .5; 3 SOLUTION RESPIRATORY (INHALATION) at 12:27

## 2024-02-10 RX ADMIN — CLONAZEPAM 0.5 MG: 0.5 TABLET ORAL at 09:55

## 2024-02-10 RX ADMIN — POTASSIUM CHLORIDE 20 MEQ: 20 SOLUTION ORAL at 17:19

## 2024-02-10 RX ADMIN — POTASSIUM CHLORIDE 20 MEQ: 20 SOLUTION ORAL at 20:33

## 2024-02-10 RX ADMIN — APIXABAN 5 MG: 5 TABLET, FILM COATED ORAL at 20:33

## 2024-02-10 RX ADMIN — PAROXETINE HYDROCHLORIDE 20 MG: 20 TABLET, FILM COATED ORAL at 09:55

## 2024-02-10 RX ADMIN — POTASSIUM CHLORIDE 10 MEQ: 7.46 INJECTION, SOLUTION INTRAVENOUS at 14:49

## 2024-02-10 ASSESSMENT — ACTIVITIES OF DAILY LIVING (ADL)
ADLS_ACUITY_SCORE: 33
ADLS_ACUITY_SCORE: 36
ADLS_ACUITY_SCORE: 33
ADLS_ACUITY_SCORE: 34
ADLS_ACUITY_SCORE: 33
ADLS_ACUITY_SCORE: 34
ADLS_ACUITY_SCORE: 33
ADLS_ACUITY_SCORE: 36
ADLS_ACUITY_SCORE: 31
ADLS_ACUITY_SCORE: 33

## 2024-02-10 NOTE — PLAN OF CARE
Goal Outcome Evaluation:      Plan of Care Reviewed With: patient    Overall Patient Progress: improvingOverall Patient Progress: improving     A & O x 4, VSS expect O2 saturations does drop without O2 on pt is currently on 4 LPM via NC. Pt does become SOB with activity. Uses delfin steady for transfers. Manages colostomy with minimal assistance. K+, Mag, and Phos protocols recheck this AM. Tolerating regular diet. MATT LOREDO. Tele SR w/PAC. MATT LOREDO. Discharge pending

## 2024-02-10 NOTE — PLAN OF CARE
A&Ox4. VSS on 4L NC. PIV SL. Tele SR - 60s. LS coarse, productive cough. Using delfin steady for transfers. Voiding on toilet. Watery stool emptied from colostomy bag. Tolerating regular diet. Gave tylenol x1 for headache. K+, Mg, P protocols, all requiring replacement today, recheck scheduled for tomorrow AM.

## 2024-02-10 NOTE — PLAN OF CARE
Patient up with delfin steady to chair and back to bed. No c/o pain. Appetite good. She is confused at times, saying she flatlined yesterday when working with PT.

## 2024-02-10 NOTE — PROGRESS NOTES
[facility]  Hospitalist Progress Note  Tommie Hilliard MD 02/10/2024    Reason for Stay (Diagnosis): resp failure         Assessment and Plan:      Summary of Stay: Lara Melendez is a 58 year old female with a past medical history of COPD, atrial fibrillation, chronic anticoagulation with Eliquis, hypothyroidism, hypertension and anxiety with multiple recent admissions for COPD exacerbation, but again for another COPD exacerbation, now intubated.  Regular attempts on weaning off mechanical ventilation.  Extubated on 2/5/2024.  Transferred out of ICU to the medical floor     Continues to require supplemental oxygen; weaning as able.  Feels like her respiratory effort/breathing is near baseline.  Declines TCU on discharge.  Patient required a Aaliyah steady to get out of bed and mobilize the past several days     Plans today:  -Continue to mobilize with rehabilitation therapy  -Wean supplemental oxygen as able.  Normally does not use at home  - discontinue tele monitoring  - can discharge when able to mobilize adequately and safely for her home environment.  Declining TCU recommendation     Acute hypoxemic hypercarbic respiratory failure 2/2 severe COPD Exacerbation  Severe COPD  History of intubation with tracheostomy  Chemical intubation required this admission, extubated 2/5/2024  Presented with COPD exacerbation; has had frequent exacerbations requiring admission, with this being her third admission in 3 weeks. She presented again with COPD exacerbation. she continued to experience air hunger and a feeling of anxiety/distress, despite seemingly adequate oxygenation and stable gases. She had received continuous nebs, nebs scheduled q2h, steroids, azithromycin, and benzos without relief. She went into AF with RVR  1/28 and was intubated for dyspnea, tachypnea. Really patient was not improving and thought about a potential tracheostomy. Patient was a started on IV diuresis, which worked really  well for her. Extubated on 2/5/2024.  -PTA meds include breo and prn duonebs, no maintenance anticholinergic and not on any meds to reduce exacerbations.   -Seen by pulmonary. Appreciate input  -Initially given IV solumedrol and subsequently transitioned to oral prednisone.   -continued prednisone 40 mg burst for 4 days (completed on 2/8).   -completed diuresis with IV lasix 20 mg BID (stopped on 2/8)  -Continue duonebs QID  -Currently on oxygen 4 liters/min.  Wean oxygen as able.  She reports she has an oxygen tank at home for emergencies, but does not normally use it  -Incentive spirometry  -Will need follow up with pulmonary after discharge.  Patient reports she already has a pulmonologist follow-up scheduled in April        Pulmonary recommendations for discharge:   -Begin LAMA inhaler at discharge such as Spiriva  -Follow-up with pulmonology after discharge (pt already sees a pulmonologist as outpatient and has appt scheduled in April)  -Consider steroid nebs rather than inhaler at discharge to increase pulmonary steroid delivery  -Consider evaluation for inflammatory/immune trigger (eosinophils, IgE) as an outpatient when off prednisone     Afib with RVR  -On both rate and rhythm control. On DOAC as well.  -Continue amiodarone 200 mg daily, need alternative agent as amiodarone not a good option in the long run given significant underlying COPD  -Continue metoprolol to 37.5 mg twice daily, with holding parameters. Patient may need down titration if continues to be bradycardic and/or hypotensive.  -Will monitor on telemetry      Leukocytosis  Acute microcytic anemia  Acute thrombocytopenia  Leukocytosis likely due to steroid use, downtrending.  Patient also noted to have acute anemia as well as thrombocytopenia, could be related to acute illness and frequent phlebotomies. Blood smear done which showed leukoerythroblastic reaction likely in the setting of acute stress.  - Continue to monitor CBC daily    "  Hypokalemia  -Due to diuresis  -Continue potassium supplementation protocol     Generalized weakness/deconditioning  -Patient evaluated by rehabilitation therapy  -TCU recommended  -Patient declining TCU.  Patient's daughter Danica was made aware of this during her visit on 2/8  -Anticipate discharge home when meets criteria listed below           DVT Prophylaxis: DOAC  Code Status: Full Code    DISPO: TCU has been recommended, but the patient declines this wants to go home.  I reviewed this with the patient's daughter Danica on 2/8 and she is aware.  Another 1-3 days in the hospital is likely, with discharge occurring after supplemental oxygen is weaned (ideally 2 liters or less), and patient able to adequately mobilize in the hallways without significant dyspnea.  still requiring a Aaliyah steady to mobilize           Interval History (Subjective):      Continues to require a Aaliyah steady to mobilize.  Continues to decline TCU.  Remains on supplemental oxygen, weaned to 3 L this morning.  Feels her breathing/respiratory effort is near baseline                  Physical Exam:      Last Vital Signs:  BP 90/52 (BP Location: Left arm)   Pulse 55   Temp 98.8  F (37.1  C) (Oral)   Resp 18   Ht 1.6 m (5' 3\")   Wt 72 kg (158 lb 11.7 oz)   SpO2 96%   BMI 28.12 kg/m        Intake/Output Summary (Last 24 hours) at 2/10/2024 0959  Last data filed at 2/9/2024 2056  Gross per 24 hour   Intake --   Output 400 ml   Net -400 ml       Constitutional: Awake, alert, cooperative, no apparent distress     Respiratory: Clear to auscultation bilaterally, no crackles or wheezing   Cardiovascular: Regular rate and rhythm, normal S1 and S2, and no murmur noted   Abdomen: Normal bowel sounds, soft, non-distended, non-tender   Skin: No rashes, no cyanosis, dry to touch   Neuro: Alert and oriented x3, no weakness, numbness, memory loss   Extremities: No edema, normal range of motion   Other(s):        All other systems: Negative          " Medications:      All current medications were reviewed with changes reflected in problem list.         Data:      All new lab and imaging data was reviewed.   Labs:  Recent Labs   Lab 02/10/24  0705      POTASSIUM 3.4   CHLORIDE 99   CO2 29   ANIONGAP 7   GLC 81   BUN 10.0   CR 0.76   GFRESTIMATED 89   EDIN 7.7*     Recent Labs   Lab 02/08/24  0630   WBC 15.9*   HGB 10.5*   HCT 31.1*   MCV 98   *      Imaging:   No results found for this or any previous visit (from the past 24 hour(s)).

## 2024-02-11 LAB
HOLD SPECIMEN: NORMAL
MAGNESIUM SERPL-MCNC: 2.2 MG/DL (ref 1.7–2.3)
PHOSPHATE SERPL-MCNC: 2.9 MG/DL (ref 2.5–4.5)
POTASSIUM SERPL-SCNC: 4.3 MMOL/L (ref 3.4–5.3)

## 2024-02-11 PROCEDURE — 94640 AIRWAY INHALATION TREATMENT: CPT

## 2024-02-11 PROCEDURE — 250N000013 HC RX MED GY IP 250 OP 250 PS 637: Performed by: STUDENT IN AN ORGANIZED HEALTH CARE EDUCATION/TRAINING PROGRAM

## 2024-02-11 PROCEDURE — 84100 ASSAY OF PHOSPHORUS: CPT | Performed by: INTERNAL MEDICINE

## 2024-02-11 PROCEDURE — 250N000009 HC RX 250: Performed by: STUDENT IN AN ORGANIZED HEALTH CARE EDUCATION/TRAINING PROGRAM

## 2024-02-11 PROCEDURE — 999N000157 HC STATISTIC RCP TIME EA 10 MIN

## 2024-02-11 PROCEDURE — 250N000013 HC RX MED GY IP 250 OP 250 PS 637: Performed by: INTERNAL MEDICINE

## 2024-02-11 PROCEDURE — 84132 ASSAY OF SERUM POTASSIUM: CPT | Performed by: INTERNAL MEDICINE

## 2024-02-11 PROCEDURE — 99232 SBSQ HOSP IP/OBS MODERATE 35: CPT | Performed by: INTERNAL MEDICINE

## 2024-02-11 PROCEDURE — 36415 COLL VENOUS BLD VENIPUNCTURE: CPT | Performed by: HOSPITALIST

## 2024-02-11 PROCEDURE — 250N000011 HC RX IP 250 OP 636: Performed by: INTERNAL MEDICINE

## 2024-02-11 PROCEDURE — 94640 AIRWAY INHALATION TREATMENT: CPT | Mod: 76

## 2024-02-11 PROCEDURE — 83735 ASSAY OF MAGNESIUM: CPT | Performed by: HOSPITALIST

## 2024-02-11 PROCEDURE — 120N000001 HC R&B MED SURG/OB

## 2024-02-11 RX ADMIN — PANTOPRAZOLE SODIUM 40 MG: 40 TABLET, DELAYED RELEASE ORAL at 11:34

## 2024-02-11 RX ADMIN — ACETAMINOPHEN 500 MG: 500 TABLET ORAL at 18:14

## 2024-02-11 RX ADMIN — METOPROLOL TARTRATE 37.5 MG: 25 TABLET, FILM COATED ORAL at 20:21

## 2024-02-11 RX ADMIN — APIXABAN 5 MG: 5 TABLET, FILM COATED ORAL at 20:21

## 2024-02-11 RX ADMIN — IPRATROPIUM BROMIDE AND ALBUTEROL SULFATE 3 ML: .5; 3 SOLUTION RESPIRATORY (INHALATION) at 20:44

## 2024-02-11 RX ADMIN — ATORVASTATIN CALCIUM 20 MG: 20 TABLET, FILM COATED ORAL at 11:34

## 2024-02-11 RX ADMIN — PAROXETINE HYDROCHLORIDE 20 MG: 20 TABLET, FILM COATED ORAL at 11:32

## 2024-02-11 RX ADMIN — IPRATROPIUM BROMIDE AND ALBUTEROL SULFATE 3 ML: .5; 3 SOLUTION RESPIRATORY (INHALATION) at 09:10

## 2024-02-11 RX ADMIN — Medication 1 TABLET: at 11:32

## 2024-02-11 RX ADMIN — APIXABAN 5 MG: 5 TABLET, FILM COATED ORAL at 11:34

## 2024-02-11 RX ADMIN — IPRATROPIUM BROMIDE AND ALBUTEROL SULFATE 3 ML: .5; 3 SOLUTION RESPIRATORY (INHALATION) at 12:09

## 2024-02-11 RX ADMIN — IPRATROPIUM BROMIDE AND ALBUTEROL SULFATE 3 ML: .5; 3 SOLUTION RESPIRATORY (INHALATION) at 17:12

## 2024-02-11 RX ADMIN — CLONAZEPAM 0.5 MG: 0.5 TABLET ORAL at 11:33

## 2024-02-11 RX ADMIN — AMIODARONE HYDROCHLORIDE 200 MG: 200 TABLET ORAL at 11:33

## 2024-02-11 RX ADMIN — LEVOTHYROXINE SODIUM 112 MCG: 0.11 TABLET ORAL at 11:33

## 2024-02-11 RX ADMIN — ONDANSETRON 4 MG: 4 TABLET, ORALLY DISINTEGRATING ORAL at 18:14

## 2024-02-11 RX ADMIN — FLUTICASONE FUROATE AND VILANTEROL 1 PUFF: 200; 25 POWDER RESPIRATORY (INHALATION) at 09:10

## 2024-02-11 ASSESSMENT — ACTIVITIES OF DAILY LIVING (ADL)
ADLS_ACUITY_SCORE: 33
ADLS_ACUITY_SCORE: 29
ADLS_ACUITY_SCORE: 33
ADLS_ACUITY_SCORE: 29
ADLS_ACUITY_SCORE: 33
ADLS_ACUITY_SCORE: 33
ADLS_ACUITY_SCORE: 34
ADLS_ACUITY_SCORE: 29
ADLS_ACUITY_SCORE: 31
ADLS_ACUITY_SCORE: 33

## 2024-02-11 NOTE — PLAN OF CARE
"Goal Outcome Evaluation:      Plan of Care Reviewed With: patient    Overall Patient Progress: improvingOverall Patient Progress: improving    Pt is alert ad oriented x 4. Pt denies any pain. Dyspnea on exertion and pt on 3L NC.     Pt is 2  assist delfin steady transfer to bed side commode.Colostomy emptied of small brown BM.     Ongoing monitoring.    CC following with discharge plan.  /52 (BP Location: Right arm)   Pulse 65   Temp 97  F (36.1  C) (Oral)   Resp 18   Ht 1.6 m (5' 3\")   Wt 72 kg (158 lb 11.7 oz)   SpO2 94%   BMI 28.12 kg/m   .         "

## 2024-02-11 NOTE — PLAN OF CARE
Goal Outcome Evaluation:      Plan of Care Reviewed With: patient    Overall Patient Progress: improvingOverall Patient Progress: improving       VS stable, Tylenol given for generalized pain. Bedside commode with Aaliyah Burgess. K and Ph replacement done. Continue to monitor.

## 2024-02-11 NOTE — PROGRESS NOTES
[facility]  Hospitalist Progress Note  Tommie Hilliard MD 02/11/2024    Reason for Stay (Diagnosis): resp failure, weakness         Assessment and Plan:      Summary of Stay: Lara Melendez is a 58 year old female with a past medical history of COPD, atrial fibrillation, chronic anticoagulation with Eliquis, hypothyroidism, hypertension and anxiety with multiple recent admissions for COPD exacerbation, but again for another COPD exacerbation, now intubated.  Regular attempts on weaning off mechanical ventilation.  Extubated on 2/5/2024.  Transferred out of ICU to the medical floor     Continues to require supplemental oxygen; weaning as able.  She feels like her respiratory effort/breathing is near baseline and she has no resp distress.      Declines TCU on discharge.  Patient requiring a Aaliyah steady and assist of 2 to get out of bed and mobilize the past several days     Plans today:  -Continue to mobilize with rehabilitation therapy  -Wean supplemental oxygen as able.  Normally does not use at home  -Discussed with patient that if she is unable to improve her strength and mobility over the next 1-2 days, TCU will need to be reconsidered     Acute hypoxemic hypercarbic respiratory failure 2/2 severe COPD Exacerbation (improved/resolved)  Severe COPD  Previous tracheostomy  Mechanical intubation required this admission, extubated 2/5/2024  Presented with COPD exacerbation; has had frequent exacerbations requiring admission, with this being her third admission in 3 weeks. She presented again with COPD exacerbation. she continued to experience air hunger and a feeling of anxiety/distress, despite seemingly adequate oxygenation and stable gases. She had received continuous nebs, nebs scheduled q2h, steroids, azithromycin, and benzos without relief. She went into AF with RVR  1/28 and was intubated for dyspnea, tachypnea. Really patient was not improving and thought about a potential tracheostomy.  Patient was a started on IV diuresis, which worked really well for her. Extubated on 2/5/2024.  -PTA meds include breo and prn duonebs, no maintenance anticholinergic and not on any meds to reduce exacerbations.   -Seen by pulmonary. Appreciate input  -Initially given IV solumedrol and subsequently transitioned to oral prednisone.   -continued prednisone 40 mg burst for 4 days (completed on 2/8).   -completed diuresis with IV lasix 20 mg BID (stopped on 2/8)  -Continue duonebs QID  -Currently on oxygen 3 liters/min.  Wean oxygen as able.  She reports she has an oxygen tank at home for emergencies, but does not normally use it  -Incentive spirometry  -Will need follow up with pulmonary after discharge.  Patient reports she already has a pulmonologist follow-up scheduled in April        Pulmonary recommendations for discharge:   -Begin LAMA inhaler at discharge such as Spiriva  -Follow-up with pulmonology after discharge (pt already sees a pulmonologist as outpatient and has appt scheduled in April)  -Consider steroid nebs rather than inhaler at discharge to increase pulmonary steroid delivery  -Consider evaluation for inflammatory/immune trigger (eosinophils, IgE) as an outpatient when off prednisone     Afib with RVR (improved)  -On both rate and rhythm control. On DOAC as well.  -Continue amiodarone 200 mg daily, need alternative agent as amiodarone not a good option in the long run given significant underlying COPD  -Continue metoprolol to 37.5 mg twice daily, with holding parameters. Patient may need down titration if continues to be bradycardic and/or hypotensive.       Hypokalemia (resolved)  -Due to diuresis  -Continue potassium supplementation protocol     Generalized weakness/deconditioning  -Patient evaluated by rehabilitation therapy  -TCU recommended  -Patient declining TCU.  Patient's daughter Danica was made aware of this during her visit on 2/8  -Anticipate discharge home when meets criteria listed  "below           DVT Prophylaxis: DOAC  Code Status: Full Code    DISPO: TCU has been recommended, but the patient declines this wants to go home.  I reviewed this with the patient's daughter Danica on 2/8 and she is aware.  Another 1-3 days in the hospital is likely, with discharge occurring after supplemental oxygen is weaned (ideally 2 liters or less), and patient able to adequately mobilize in the hallways.  still requiring a Aaliyah steady and assist of 2 to mobilize           Interval History (Subjective):      Continues to require a Aaliyah steady and staff assistance to mobilize.  Continues to decline TCU.  On 3 L supplemental oxygen this morning.  Not short of breath, in regards to work of breathing her respiratory status is near baseline.                  Physical Exam:      Last Vital Signs:  /55 (BP Location: Left arm)   Pulse 60   Temp 98.4  F (36.9  C) (Oral)   Resp 18   Ht 1.6 m (5' 3\")   Wt 72 kg (158 lb 11.7 oz)   SpO2 96%   BMI 28.12 kg/m        Intake/Output Summary (Last 24 hours) at 2/11/2024 1009  Last data filed at 2/11/2024 0600  Gross per 24 hour   Intake --   Output 125 ml   Net -125 ml       Constitutional: Awake, alert, cooperative, no apparent distress     Respiratory: Clear to auscultation bilaterally, no crackles or wheezing   Cardiovascular: Regular rate and rhythm, normal S1 and S2, and no murmur noted   Abdomen: Normal bowel sounds, soft, non-distended, non-tender   Skin: No rashes, no cyanosis, dry to touch   Neuro: Alert and oriented x3, no weakness, numbness, memory loss   Extremities: No edema, normal range of motion   Other(s):        All other systems: Negative          Medications:      All current medications were reviewed with changes reflected in problem list.         Data:      All new lab and imaging data was reviewed.   Labs:       Lab Results   Component Value Date     02/10/2024     02/09/2024     02/08/2024     02/02/2006     " 02/01/2006     01/31/2006    Lab Results   Component Value Date    CHLORIDE 99 02/10/2024    CHLORIDE 97 02/09/2024    CHLORIDE 94 02/08/2024    CHLORIDE 99 06/21/2023    CHLORIDE 103 05/17/2022    CHLORIDE 106 05/16/2022    CHLORIDE 107 05/15/2022    CHLORIDE 109 02/02/2006    CHLORIDE 107 02/01/2006    CHLORIDE 100 01/31/2006    Lab Results   Component Value Date    BUN 10.0 02/10/2024    BUN 16.1 02/09/2024    BUN 21.7 02/08/2024    BUN 7 06/21/2023    BUN 17 05/17/2022    BUN 14 05/16/2022    BUN 10 05/15/2022    BUN <2 02/02/2006    BUN 3 02/01/2006    BUN 4 01/31/2006      Lab Results   Component Value Date    POTASSIUM 4.3 02/11/2024    POTASSIUM 3.8 02/10/2024    POTASSIUM 3.4 02/10/2024    POTASSIUM 5.1 06/21/2023    POTASSIUM 4.5 05/17/2022    POTASSIUM 4.6 05/16/2022    POTASSIUM 4.1 05/15/2022    POTASSIUM 3.4 02/02/2006    POTASSIUM 3.5 02/01/2006    POTASSIUM 3.9 01/31/2006    Lab Results   Component Value Date    CO2 29 02/10/2024    CO2 30 02/09/2024    CO2 35 02/08/2024    CO2 29 05/17/2022    CO2 31 05/16/2022    CO2 24 05/15/2022    CO2 17 02/02/2006    CO2 23 02/01/2006    CO2 26 01/31/2006    Lab Results   Component Value Date    CR 0.76 02/10/2024    CR 0.77 02/09/2024    CR 0.75 02/08/2024    CR 0.80 02/02/2006    CR 0.90 02/01/2006    CR 0.90 01/31/2006        Recent Labs   Lab 02/08/24  0630   WBC 15.9*   HGB 10.5*   HCT 31.1*   MCV 98   *      Imaging:   No results found for this or any previous visit (from the past 24 hour(s)).

## 2024-02-11 NOTE — PLAN OF CARE
"/57 (BP Location: Left arm, Patient Position: Semi-Dorsey's, Cuff Size: Adult Regular)   Pulse 60   Temp 98.4  F (36.9  C) (Oral)   Resp 18   Ht 1.6 m (5' 3\")   Wt 72 kg (158 lb 11.7 oz)   SpO2 93%   BMI 28.12 kg/m      Pt A&Ox4. A2 delfin steady. Pt is still weak. Up in chair this shift. Denies pain. On 2L NC at 90%. Scheduled duonebs.On eliquis BID. Colostomy. K 4.3, Phos 2.9, Mag 2.2 - recheck in AM. Regular diet. PT, OT following. Recommending TCU but patient is wanting to home. Discharge TBD.      Goal Outcome Evaluation:      Plan of Care Reviewed With: patient, spouse    Overall Patient Progress: improvingOverall Patient Progress: improving           "

## 2024-02-11 NOTE — PROVIDER NOTIFICATION
RCAT Treatment Plan    Patient Score: 10  Patient Acuity: 4    Clinical Indication for Therapy: history of COPD    Therapy Ordered: Duoneb QID per physician order/pt home regimen: pt reports she uses Duoneb BID @ home maintenance and QID when she is ill. Will keep Duoneb QID.     Education: deep breathing and coughing, Aerobika use  Assessment Summary:      02/10/24 2008   RCAT Assessment   Reason for Assessment COPD   Pulmonary Status 3   Surgical Status 0   Chest X-ray 1   Respiratory Pattern 2   Mental Status 0   Breath Sounds 2   Cough Effectiveness 0   Level of Activity 1   O2 Required for SpO2>=92% 1   Acuity Level (points) 10   Acuity Level  4   Re-eval Interval Guideline Every 3 days   Clinical Indications/Symptoms   Aerosol Therapy Physician order;Home regimen   Aerosol Therapy Plan   RT Treatment Nebulizer   Anticholinergic/Beta-Andrenergic Agonist Duoneb soln (0.5mg/3mg per 3mL) neb Max 6 doses/24h   Aerosol Treatment Frequency Acuity Level 3: QID/PRN @noc-Mod wheezing/Hx asthma/secretion removal   Aerosol Therapy (SVN)   Respiratory Treatment Status (SVN) given   Patient Position HOB elevated   Posttreatment Assessment (SVN) breath sounds unchanged   Signs of Intolerance (SVN) none   Breath Sounds   Breath Sounds All Fields   All Lung Fields Breath Sounds diminished;coarse         Dior Barba, RT  2/10/2024

## 2024-02-12 ENCOUNTER — APPOINTMENT (OUTPATIENT)
Dept: OCCUPATIONAL THERAPY | Facility: CLINIC | Age: 59
End: 2024-02-12
Payer: COMMERCIAL

## 2024-02-12 ENCOUNTER — APPOINTMENT (OUTPATIENT)
Dept: PHYSICAL THERAPY | Facility: CLINIC | Age: 59
End: 2024-02-12
Payer: COMMERCIAL

## 2024-02-12 LAB
MAGNESIUM SERPL-MCNC: 2 MG/DL (ref 1.7–2.3)
PHOSPHATE SERPL-MCNC: 2.7 MG/DL (ref 2.5–4.5)
POTASSIUM SERPL-SCNC: 3.9 MMOL/L (ref 3.4–5.3)

## 2024-02-12 PROCEDURE — 97530 THERAPEUTIC ACTIVITIES: CPT | Mod: GO

## 2024-02-12 PROCEDURE — 250N000009 HC RX 250: Performed by: STUDENT IN AN ORGANIZED HEALTH CARE EDUCATION/TRAINING PROGRAM

## 2024-02-12 PROCEDURE — 94640 AIRWAY INHALATION TREATMENT: CPT

## 2024-02-12 PROCEDURE — 250N000013 HC RX MED GY IP 250 OP 250 PS 637: Performed by: INTERNAL MEDICINE

## 2024-02-12 PROCEDURE — 84132 ASSAY OF SERUM POTASSIUM: CPT | Performed by: INTERNAL MEDICINE

## 2024-02-12 PROCEDURE — 97530 THERAPEUTIC ACTIVITIES: CPT | Mod: GP | Performed by: PHYSICAL THERAPIST

## 2024-02-12 PROCEDURE — 99232 SBSQ HOSP IP/OBS MODERATE 35: CPT | Performed by: INTERNAL MEDICINE

## 2024-02-12 PROCEDURE — 94640 AIRWAY INHALATION TREATMENT: CPT | Mod: 76

## 2024-02-12 PROCEDURE — 120N000001 HC R&B MED SURG/OB

## 2024-02-12 PROCEDURE — 999N000157 HC STATISTIC RCP TIME EA 10 MIN

## 2024-02-12 PROCEDURE — 250N000013 HC RX MED GY IP 250 OP 250 PS 637: Performed by: STUDENT IN AN ORGANIZED HEALTH CARE EDUCATION/TRAINING PROGRAM

## 2024-02-12 PROCEDURE — 36415 COLL VENOUS BLD VENIPUNCTURE: CPT | Performed by: HOSPITALIST

## 2024-02-12 PROCEDURE — 84100 ASSAY OF PHOSPHORUS: CPT | Performed by: INTERNAL MEDICINE

## 2024-02-12 PROCEDURE — 97110 THERAPEUTIC EXERCISES: CPT | Mod: GO

## 2024-02-12 PROCEDURE — 83735 ASSAY OF MAGNESIUM: CPT | Performed by: HOSPITALIST

## 2024-02-12 RX ORDER — MAGNESIUM OXIDE 400 MG/1
400 TABLET ORAL EVERY 4 HOURS
Status: COMPLETED | OUTPATIENT
Start: 2024-02-12 | End: 2024-02-12

## 2024-02-12 RX ADMIN — FLUTICASONE FUROATE AND VILANTEROL 1 PUFF: 200; 25 POWDER RESPIRATORY (INHALATION) at 07:11

## 2024-02-12 RX ADMIN — APIXABAN 5 MG: 5 TABLET, FILM COATED ORAL at 23:06

## 2024-02-12 RX ADMIN — ACETAMINOPHEN 500 MG: 500 TABLET ORAL at 20:11

## 2024-02-12 RX ADMIN — IPRATROPIUM BROMIDE AND ALBUTEROL SULFATE 3 ML: .5; 3 SOLUTION RESPIRATORY (INHALATION) at 15:41

## 2024-02-12 RX ADMIN — CLONAZEPAM 0.5 MG: 0.5 TABLET ORAL at 10:40

## 2024-02-12 RX ADMIN — PAROXETINE HYDROCHLORIDE 20 MG: 20 TABLET, FILM COATED ORAL at 10:39

## 2024-02-12 RX ADMIN — IPRATROPIUM BROMIDE AND ALBUTEROL SULFATE 3 ML: .5; 3 SOLUTION RESPIRATORY (INHALATION) at 20:58

## 2024-02-12 RX ADMIN — Medication 1 TABLET: at 10:39

## 2024-02-12 RX ADMIN — POTASSIUM & SODIUM PHOSPHATES POWDER PACK 280-160-250 MG 1 PACKET: 280-160-250 PACK at 10:37

## 2024-02-12 RX ADMIN — IPRATROPIUM BROMIDE AND ALBUTEROL SULFATE 3 ML: .5; 3 SOLUTION RESPIRATORY (INHALATION) at 07:11

## 2024-02-12 RX ADMIN — LEVOTHYROXINE SODIUM 112 MCG: 0.11 TABLET ORAL at 10:39

## 2024-02-12 RX ADMIN — METOPROLOL TARTRATE 37.5 MG: 25 TABLET, FILM COATED ORAL at 23:06

## 2024-02-12 RX ADMIN — APIXABAN 5 MG: 5 TABLET, FILM COATED ORAL at 10:39

## 2024-02-12 RX ADMIN — IPRATROPIUM BROMIDE AND ALBUTEROL SULFATE 3 ML: .5; 3 SOLUTION RESPIRATORY (INHALATION) at 11:52

## 2024-02-12 RX ADMIN — POTASSIUM & SODIUM PHOSPHATES POWDER PACK 280-160-250 MG 1 PACKET: 280-160-250 PACK at 14:53

## 2024-02-12 RX ADMIN — PANTOPRAZOLE SODIUM 40 MG: 40 TABLET, DELAYED RELEASE ORAL at 10:39

## 2024-02-12 RX ADMIN — AMIODARONE HYDROCHLORIDE 200 MG: 200 TABLET ORAL at 10:39

## 2024-02-12 RX ADMIN — ATORVASTATIN CALCIUM 20 MG: 20 TABLET, FILM COATED ORAL at 10:39

## 2024-02-12 RX ADMIN — MAGNESIUM OXIDE TAB 400 MG (241.3 MG ELEMENTAL MG) 400 MG: 400 (241.3 MG) TAB at 10:38

## 2024-02-12 RX ADMIN — MAGNESIUM OXIDE TAB 400 MG (241.3 MG ELEMENTAL MG) 400 MG: 400 (241.3 MG) TAB at 14:53

## 2024-02-12 ASSESSMENT — ACTIVITIES OF DAILY LIVING (ADL)
ADLS_ACUITY_SCORE: 29
ADLS_ACUITY_SCORE: 27
ADLS_ACUITY_SCORE: 29
ADLS_ACUITY_SCORE: 27
ADLS_ACUITY_SCORE: 27
ADLS_ACUITY_SCORE: 29
ADLS_ACUITY_SCORE: 27
ADLS_ACUITY_SCORE: 27
ADLS_ACUITY_SCORE: 29
ADLS_ACUITY_SCORE: 29

## 2024-02-12 NOTE — PROGRESS NOTES
[facility]  Hospitalist Progress Note  Tommie Hilliard MD 02/12/2024    Reason for Stay (Diagnosis): COPD exacerbation         Assessment and Plan:      Summary of Stay: Lara Melendez is a  58 year old female with a past medical history of COPD, atrial fibrillation, chronic anticoagulation with Eliquis, hypothyroidism, hypertension and anxiety with multiple recent admissions for COPD exacerbation, but again for another COPD exacerbation, now intubated.  Regular attempts on weaning off mechanical ventilation.  Extubated on 2/5/2024.  Transferred out of ICU to the medical floor     Continues to require supplemental oxygen; weaning as able.  She feels like her respiratory effort/breathing is near baseline and she has no resp distress.       Declines TCU on discharge.  Patient requiring a Aaliyah steady and assist of 2 to get out of bed and mobilize the past several days     Plans today:  -Continue to mobilize with rehabilitation therapy  -continue to wean supplemental oxygen as able.  Normally does not use at home  -Discussed with patient that if she is unable to improve her strength and mobility over the next 1-2 days to the point that she can discharge home safely, TCU will need to be reconsidered     Acute hypoxemic hypercarbic respiratory failure 2/2 severe COPD Exacerbation (improved/resolved)  Severe COPD  Previous tracheostomy  Mechanical intubation required this admission, extubated 2/5/2024  Presented with COPD exacerbation; has had frequent exacerbations requiring admission, with this being her third admission in 3 weeks. She presented again with COPD exacerbation. she continued to experience air hunger and a feeling of anxiety/distress, despite seemingly adequate oxygenation and stable gases. She had received continuous nebs, nebs scheduled q2h, steroids, azithromycin, and benzos without relief. She went into AF with RVR  1/28 and was intubated for dyspnea, tachypnea. Really patient was  not improving and thought about a potential tracheostomy. Patient was a started on IV diuresis, which worked really well for her. Extubated on 2/5/2024.  -PTA meds include breo and prn duonebs, no maintenance anticholinergic and not on any meds to reduce exacerbations.   -Seen by pulmonary. Appreciate input  -Initially given IV solumedrol and subsequently transitioned to oral prednisone.   -continued prednisone 40 mg burst for 4 days (completed on 2/8).   -completed diuresis with IV lasix 20 mg BID (stopped on 2/8)  -Continue duonebs QID  -Currently on oxygen 3 liters/min.  Wean oxygen as able.  She reports she has an oxygen tank at home for emergencies, but does not normally use it  -Incentive spirometry  -Will need follow up with pulmonary after discharge.  Patient reports she already has a pulmonologist follow-up scheduled in April        Pulmonary recommendations for discharge:   -Begin LAMA inhaler at discharge such as Spiriva  -Follow-up with pulmonology after discharge (pt already sees a pulmonologist as outpatient and has appt scheduled in April)  -Consider steroid nebs rather than inhaler at discharge to increase pulmonary steroid delivery  -Consider evaluation for inflammatory/immune trigger (eosinophils, IgE) as an outpatient when off prednisone     Afib with RVR (improved)  -On both rate and rhythm control. On DOAC as well.  -Continue amiodarone 200 mg daily, need alternative agent as amiodarone not a good option in the long run given significant underlying COPD  -Continue metoprolol to 37.5 mg twice daily, with holding parameters. Patient may need down titration if continues to be bradycardic and/or hypotensive.        Hypokalemia (resolved)  -Due to diuresis  -Continue potassium supplementation protocol     Generalized weakness/deconditioning  -Patient evaluated by rehabilitation therapy  -TCU recommended  -Patient declining TCU.  Patient's daughter Danica was made aware of this during her visit on  "2/8  -Anticipate discharge home when meets criteria listed below           DVT Prophylaxis: DOAC  Code Status: Full Code    DISPO: TCU has been recommended, but the patient declines this wants to go home.  I reviewed this with the patient's daughter Danica on 2/8 and she is aware.  Another 1-3 days in the hospital is likely, with discharge occurring after supplemental oxygen is weaned (ideally discharge on 2 liters or less), and patient able to adequately mobilize in the hallways.  She is still requiring a Aaliyah steady and assist of 2 to mobilize           Interval History (Subjective):      Patient feels her respiratory status is near baseline.  Still requiring low-dose supplemental oxygen.  Continues to decline TCU.  Still requiring a Aaliyah steady to get out of bed                  Physical Exam:      Last Vital Signs:  BP 97/55 (BP Location: Left arm)   Pulse 58   Temp 98.9  F (37.2  C) (Oral)   Resp 18   Ht 1.6 m (5' 3\")   Wt 72 kg (158 lb 11.7 oz)   SpO2 92%   BMI 28.12 kg/m        Intake/Output Summary (Last 24 hours) at 2/12/2024 1025  Last data filed at 2/12/2024 0633  Gross per 24 hour   Intake --   Output 250 ml   Net -250 ml       Constitutional: Awake, alert, cooperative, no apparent distress     Respiratory: Clear to auscultation bilaterally, no crackles or wheezing   Cardiovascular: Regular rate and rhythm, normal S1 and S2, and no murmur noted   Abdomen: Normal bowel sounds, soft, non-distended, non-tender   Skin: No rashes, no cyanosis, dry to touch   Neuro: Alert and oriented x3, no weakness, numbness, memory loss   Extremities: No edema, normal range of motion   Other(s):        All other systems: Negative          Medications:      All current medications were reviewed with changes reflected in problem list.         Data:      All new lab and imaging data was reviewed.   Labs:       Lab Results   Component Value Date     02/10/2024     02/09/2024     02/08/2024     " 02/02/2006     02/01/2006     01/31/2006    Lab Results   Component Value Date    CHLORIDE 99 02/10/2024    CHLORIDE 97 02/09/2024    CHLORIDE 94 02/08/2024    CHLORIDE 99 06/21/2023    CHLORIDE 103 05/17/2022    CHLORIDE 106 05/16/2022    CHLORIDE 107 05/15/2022    CHLORIDE 109 02/02/2006    CHLORIDE 107 02/01/2006    CHLORIDE 100 01/31/2006    Lab Results   Component Value Date    BUN 10.0 02/10/2024    BUN 16.1 02/09/2024    BUN 21.7 02/08/2024    BUN 7 06/21/2023    BUN 17 05/17/2022    BUN 14 05/16/2022    BUN 10 05/15/2022    BUN <2 02/02/2006    BUN 3 02/01/2006    BUN 4 01/31/2006      Lab Results   Component Value Date    POTASSIUM 3.9 02/12/2024    POTASSIUM 4.3 02/11/2024    POTASSIUM 3.8 02/10/2024    POTASSIUM 5.1 06/21/2023    POTASSIUM 4.5 05/17/2022    POTASSIUM 4.6 05/16/2022    POTASSIUM 4.1 05/15/2022    POTASSIUM 3.4 02/02/2006    POTASSIUM 3.5 02/01/2006    POTASSIUM 3.9 01/31/2006    Lab Results   Component Value Date    CO2 29 02/10/2024    CO2 30 02/09/2024    CO2 35 02/08/2024    CO2 29 05/17/2022    CO2 31 05/16/2022    CO2 24 05/15/2022    CO2 17 02/02/2006    CO2 23 02/01/2006    CO2 26 01/31/2006    Lab Results   Component Value Date    CR 0.76 02/10/2024    CR 0.77 02/09/2024    CR 0.75 02/08/2024    CR 0.80 02/02/2006    CR 0.90 02/01/2006    CR 0.90 01/31/2006        Recent Labs   Lab 02/08/24  0630   WBC 15.9*   HGB 10.5*   HCT 31.1*   MCV 98   *      Imaging:   No results found for this or any previous visit (from the past 24 hour(s)).

## 2024-02-12 NOTE — PLAN OF CARE
Goal Outcome Evaluation:      Plan of Care Reviewed With: patient    Overall Patient Progress: improvingOverall Patient Progress: improving     Patient A/O x4, Tylenol given for generalized pain with relief. Up in chair with Aaliyah Burgess, belt and assist of two. Colostomy present. Patient on oxygen 2LNC. Continue to monitor.    2238: O2 up to 3L , O2 sat down to 89 when sleeping.

## 2024-02-12 NOTE — PROGRESS NOTES
"CLINICAL NUTRITION SERVICES - REASSESSMENT NOTE     Nutrition Prescription      Malnutrition Status:    % Weight Loss:  None noted  % Intake:  </= 50% for >/= 5 days (severe malnutrition) --suspect though unclear if  is bringing in food  Subcutaneous Fat Loss:  None observed  Muscle Loss:  None observed  Fluid Retention:  trace to mild    Malnutrition Diagnosis: Patient does not meet two of the above criteria necessary for diagnosing malnutrition but is at risk    Recommendations already ordered by Registered Dietitian (RD):  Added Magic Cup daily between meals    Future/Additional Recommendations:  Monitor intake, placed patient care order requesting more documentation of intake including if family brings food     EVALUATION OF THE PROGRESS TOWARD GOALS   Diet: Regular + Ensure Enlive prn between meals    Nutrition Support:  EN 1/28-2/6 when extubated    Intake/Tolerance:  poor to good, most often noted as having \"fair\" appetite. Ordering <1000 kcal/day. Not meeting needs. Patient reports no concerns with diet, but is ordering very little. During previous admissions, patient's  has often provided her with food from outside of hospital as she dislikes the food here, however this is not documented.      NEW FINDINGS   General: patient reports no concerns with hospital meals or appetite though did not provide further detail. Encouraged her to have her  bring meals in order to promote intake. She's looking forward to discharge--TCU recommended, but patient wants to go home. Unable to reach RN for further information.    Weight: weight up from admission, trace to mild edema noted  Date/Time Weight Weight Method   02/11/24 0424 72 kg (158 lb 11.7 oz) Bed scale   02/08/24 0635 72 kg (158 lb 11.7 oz) Bed scale   02/07/24 1355 75.4 kg (166 lb 3.6 oz) Bed scale   02/05/24 0600 74.3 kg (163 lb 14.4 oz) Bed scale   02/04/24 0400 76 kg (167 lb 8 oz) Bed scale   02/03/24 0600 77.1 kg (170 lb) Bed scale "   02/02/24 0300 77.9 kg (171 lb 11.8 oz) Bed scale   02/01/24 0415 74.4 kg (164 lb 0.4 oz) Bed scale   01/31/24 0345 70.9 kg (156 lb 4.9 oz) Bed scale   01/30/24 0357 66.8 kg (147 lb 4.3 oz) --   01/29/24 0430 66.4 kg (146 lb 6.2 oz) Bed scale   01/27/24 0600 67 kg (147 lb 11.3 oz) Bed scale   01/22/24 1833 67 kg (147 lb 9.6 oz) Standing scale     Labs: reviewed    GI: colostomy- 300, no concerns    Skin: no concerns noted    Meds:   - Lipitor  - children's mvit/M  - levothyroxine  - Mag-ox  - pantoprazole  - neutra-Phos    ASSESSED NUTRITION NEEDS:  Dosing Weight: 66.4 kg, lowest documented wt this admission   Estimated Energy Needs:  8030-6823 kcals/day (25-30 kcals/kg)   Justification: maintenance   Estimated Protein Needs: 66-80 grams protein/day (1-1.2 grams of pro/kg)   Justification: maintenance   Estimated Fluid Needs: Per provider pending fluid status     MALNUTRITION  % Weight Loss:  None noted  % Intake:  </= 50% for >/= 5 days (severe malnutrition) --suspect though unclear if  is bringing in food  Subcutaneous Fat Loss:  None observed  Muscle Loss:  None observed  Fluid Retention:  trace to mild    Malnutrition Diagnosis: Patient does not meet two of the above criteria necessary for diagnosing malnutrition but is at risk    Previous Goals   Patient to consume >/=75% of meals ordered TID vs the equivalent in oral nutritional supplements     Evaluation: Not met    Previous Nutrition Diagnosis  Predicted inadequate nutrient intake related to potential for PO intake to decline pending clinical course    Evaluation: Declining    CURRENT NUTRITION DIAGNOSIS  Inadequate oral intake related to food preferences, limited appetite as evidenced by patient likely meeting <50% of estimated needs      INTERVENTIONS  Implementation  Medical food supplement therapy- add Magic Cup daily between meals    Goals  Patient to consume % of nutritionally adequate meal trays TID, or the equivalent with  supplements/snacks.    Monitoring/Evaluation  Progress toward goals will be monitored and evaluated per protocol.    Danielle Molina RD, LD, [facility]  Pager - 3rd floor/ICU: [phone]  Pager - All other floors: [phone]  Pager - Weekend/holiday: [phone]  Office: [phone]

## 2024-02-12 NOTE — PLAN OF CARE
"BP (!) 89/51   Pulse 60   Temp 98.9  F (37.2  C) (Oral)   Resp 18   Ht 1.6 m (5' 3\")   Wt 72 kg (158 lb 11.7 oz)   SpO2 91%   BMI 28.12 kg/m        Pt A&Ox4. A2 delfin steady. Up in chair this shift. Denies pain. On 3L NC at 91%. Scheduled duonebs.On eliquis BID. Colostomy bag changed. K 3.9, Phos 2.7 - replaced, Mag 2 - replaced - recheck labs in AM. Regular diet. PT, OT following. Recommending TCU but patient is wanting to home. Discharge TBD.             Goal Outcome Evaluation:      Plan of Care Reviewed With: patient    Overall Patient Progress: improvingOverall Patient Progress: improving           "

## 2024-02-12 NOTE — PLAN OF CARE
Goal Outcome Evaluation:      Plan of Care Reviewed With: patient    Overall Patient Progress: no change    Pt A&Ox4. Pt calm and cooperative. Denies pain. On 3L NC. Left PIV SL; dry, clean and intact. Phos, Mag and K re-check AM. Right and left arm bruising present. Midline colostomy dry, clean and intact. Pending discharge to home once pt gains back strength, baseline mobility and O2 needs improve. Nurse will continue with POC.

## 2024-02-13 ENCOUNTER — APPOINTMENT (OUTPATIENT)
Dept: PHYSICAL THERAPY | Facility: CLINIC | Age: 59
End: 2024-02-13
Payer: COMMERCIAL

## 2024-02-13 ENCOUNTER — APPOINTMENT (OUTPATIENT)
Dept: OCCUPATIONAL THERAPY | Facility: CLINIC | Age: 59
End: 2024-02-13
Payer: COMMERCIAL

## 2024-02-13 LAB
HOLD SPECIMEN: NORMAL
MAGNESIUM SERPL-MCNC: 2.1 MG/DL (ref 1.7–2.3)
PHOSPHATE SERPL-MCNC: 3.1 MG/DL (ref 2.5–4.5)
POTASSIUM SERPL-SCNC: 3.9 MMOL/L (ref 3.4–5.3)

## 2024-02-13 PROCEDURE — 36415 COLL VENOUS BLD VENIPUNCTURE: CPT | Performed by: HOSPITALIST

## 2024-02-13 PROCEDURE — 83735 ASSAY OF MAGNESIUM: CPT | Performed by: HOSPITALIST

## 2024-02-13 PROCEDURE — 97535 SELF CARE MNGMENT TRAINING: CPT | Mod: GO

## 2024-02-13 PROCEDURE — 94640 AIRWAY INHALATION TREATMENT: CPT | Mod: 76

## 2024-02-13 PROCEDURE — 97530 THERAPEUTIC ACTIVITIES: CPT | Mod: GP | Performed by: PHYSICAL THERAPIST

## 2024-02-13 PROCEDURE — G0463 HOSPITAL OUTPT CLINIC VISIT: HCPCS

## 2024-02-13 PROCEDURE — 84100 ASSAY OF PHOSPHORUS: CPT | Performed by: INTERNAL MEDICINE

## 2024-02-13 PROCEDURE — 99232 SBSQ HOSP IP/OBS MODERATE 35: CPT | Performed by: HOSPITALIST

## 2024-02-13 PROCEDURE — 250N000013 HC RX MED GY IP 250 OP 250 PS 637: Performed by: STUDENT IN AN ORGANIZED HEALTH CARE EDUCATION/TRAINING PROGRAM

## 2024-02-13 PROCEDURE — 999N000157 HC STATISTIC RCP TIME EA 10 MIN

## 2024-02-13 PROCEDURE — 250N000013 HC RX MED GY IP 250 OP 250 PS 637: Performed by: INTERNAL MEDICINE

## 2024-02-13 PROCEDURE — 94640 AIRWAY INHALATION TREATMENT: CPT

## 2024-02-13 PROCEDURE — 999N000156 HC STATISTIC RCP CONSULT EA 30 MIN

## 2024-02-13 PROCEDURE — 84132 ASSAY OF SERUM POTASSIUM: CPT | Performed by: INTERNAL MEDICINE

## 2024-02-13 PROCEDURE — 120N000001 HC R&B MED SURG/OB

## 2024-02-13 PROCEDURE — 250N000009 HC RX 250: Performed by: STUDENT IN AN ORGANIZED HEALTH CARE EDUCATION/TRAINING PROGRAM

## 2024-02-13 RX ADMIN — METOPROLOL TARTRATE 37.5 MG: 25 TABLET, FILM COATED ORAL at 20:55

## 2024-02-13 RX ADMIN — FLUTICASONE FUROATE AND VILANTEROL 1 PUFF: 200; 25 POWDER RESPIRATORY (INHALATION) at 07:11

## 2024-02-13 RX ADMIN — ACETAMINOPHEN 500 MG: 500 TABLET ORAL at 16:18

## 2024-02-13 RX ADMIN — METOPROLOL TARTRATE 37.5 MG: 25 TABLET, FILM COATED ORAL at 08:01

## 2024-02-13 RX ADMIN — PAROXETINE HYDROCHLORIDE 20 MG: 20 TABLET, FILM COATED ORAL at 08:02

## 2024-02-13 RX ADMIN — APIXABAN 5 MG: 5 TABLET, FILM COATED ORAL at 08:00

## 2024-02-13 RX ADMIN — ATORVASTATIN CALCIUM 20 MG: 20 TABLET, FILM COATED ORAL at 08:00

## 2024-02-13 RX ADMIN — IPRATROPIUM BROMIDE AND ALBUTEROL SULFATE 3 ML: .5; 3 SOLUTION RESPIRATORY (INHALATION) at 12:26

## 2024-02-13 RX ADMIN — IPRATROPIUM BROMIDE AND ALBUTEROL SULFATE 3 ML: .5; 3 SOLUTION RESPIRATORY (INHALATION) at 15:44

## 2024-02-13 RX ADMIN — LEVOTHYROXINE SODIUM 112 MCG: 0.11 TABLET ORAL at 08:00

## 2024-02-13 RX ADMIN — CLONAZEPAM 0.5 MG: 0.5 TABLET ORAL at 08:00

## 2024-02-13 RX ADMIN — AMIODARONE HYDROCHLORIDE 200 MG: 200 TABLET ORAL at 08:01

## 2024-02-13 RX ADMIN — IPRATROPIUM BROMIDE AND ALBUTEROL SULFATE 3 ML: .5; 3 SOLUTION RESPIRATORY (INHALATION) at 07:11

## 2024-02-13 RX ADMIN — Medication 1 TABLET: at 08:01

## 2024-02-13 RX ADMIN — APIXABAN 5 MG: 5 TABLET, FILM COATED ORAL at 20:55

## 2024-02-13 RX ADMIN — IPRATROPIUM BROMIDE AND ALBUTEROL SULFATE 3 ML: .5; 3 SOLUTION RESPIRATORY (INHALATION) at 20:42

## 2024-02-13 RX ADMIN — PANTOPRAZOLE SODIUM 40 MG: 40 TABLET, DELAYED RELEASE ORAL at 07:55

## 2024-02-13 ASSESSMENT — ACTIVITIES OF DAILY LIVING (ADL)
ADLS_ACUITY_SCORE: 29
ADLS_ACUITY_SCORE: 27
ADLS_ACUITY_SCORE: 27
ADLS_ACUITY_SCORE: 28
ADLS_ACUITY_SCORE: 29
ADLS_ACUITY_SCORE: 26
ADLS_ACUITY_SCORE: 28
ADLS_ACUITY_SCORE: 29
ADLS_ACUITY_SCORE: 27
ADLS_ACUITY_SCORE: 29

## 2024-02-13 NOTE — PROGRESS NOTES
[facility]    Medicine Progress Note - Hospitalist Service    Date of Admission:  1/22/2024    Assessment & Plan    Summary of Stay: Lara Melendez is a  58 year old female with a past medical history of COPD, atrial fibrillation, chronic anticoagulation with Eliquis, hypothyroidism, hypertension and anxiety with multiple recent admissions for COPD exacerbation, but again for another COPD exacerbation, now intubated.  Regular attempts on weaning off mechanical ventilation.  Extubated on 2/5/2024.  Transferred out of ICU to the medical floor     Continues to require supplemental oxygen; weaning as able.  She feels like her respiratory effort/breathing is near baseline and she has no resp distress.       Near fall overnight, still requiring anu steady. Plan to attempt pivoting tomorrow. Doesn't have 24/7 help at home. Refusing TCU, not safe to discharge home but hopefully will be with a few more days of therapy. Ok to leave AMA if she chooses     Acute hypoxemic hypercarbic respiratory failure 2/2 severe COPD Exacerbation (improved/resolved)  Severe COPD  Previous tracheostomy  Mechanical intubation required this admission, extubated 2/5/2024  Presented with COPD exacerbation; has had frequent exacerbations requiring admission, with this being her third admission in 3 weeks. She presented again with COPD exacerbation. she continued to experience air hunger and a feeling of anxiety/distress, despite seemingly adequate oxygenation and stable gases. She had received continuous nebs, nebs scheduled q2h, steroids, azithromycin, and benzos without relief. She went into AF with RVR  1/28 and was intubated for dyspnea, tachypnea. Really patient was not improving and thought about a potential tracheostomy. Patient was a started on IV diuresis, which worked really well for her. Extubated on 2/5/2024.  -PTA meds include breo and prn duonebs, no maintenance anticholinergic and not on any meds to reduce  exacerbations.   -Seen by pulmonary. Appreciate input  -Initially given IV solumedrol and subsequently transitioned to oral prednisone.   -continued prednisone 40 mg burst for 4 days (completed on 2/8).   -completed diuresis with IV lasix 20 mg BID (stopped on 2/8)  -Continue duonebs QID  -Will need follow up with pulmonary after discharge.  Patient reports she already has a pulmonologist follow-up scheduled in April  -O2 assessment ordered, remains on 4L NC and will likely need on discharge. Has old O2 tank at home but not sufficient for current needs, discussed with patient        Pulmonary recommendations for discharge:   -Begin LAMA inhaler at discharge such as Spiriva  -Follow-up with pulmonology after discharge (pt already sees a pulmonologist as outpatient and has appt scheduled in April)  -Consider steroid nebs rather than inhaler at discharge to increase pulmonary steroid delivery  -Consider evaluation for inflammatory/immune trigger (eosinophils, IgE) as an outpatient when off prednisone     Afib with RVR (improved)  -On both rate and rhythm control. On DOAC as well.  -Continue amiodarone 200 mg daily, need alternative agent as amiodarone not a good option in the long run given significant underlying COPD  -Continue metoprolol to 37.5 mg twice daily, with holding parameters. Patient may need down titration if continues to be bradycardic and/or hypotensive.     Borderline hypotension  -losartan stopped    Hypokalemia (resolved)  -Due to diuresis  -Continue potassium supplementation protocol     Generalized weakness/deconditioning  -Patient evaluated by rehabilitation therapy  -Patient declining TCU.  Patient's daughter Danica was made aware of this during her visit on 2/8  -Anticipate discharge home when meets criteria listed below but still high fall risk without sufficient help at home        Diet: Combination Diet Regular Diet; Thin Liquids (level 0) (small bites/sips, sit upright for PO, pace slowly when  eating/drinking)  Snacks/Supplements Adult: Ensure Enlive; Between Meals    DVT Prophylaxis: DOAC  Hines Catheter: Not present  Lines: None     Cardiac Monitoring: None  Code Status: Full Code        Disposition Plan   Await further strengthening as above, not safe to discharge home in current state. Refusing TCU         Shilo Zuniga DO  Hospitalist Service  [facility]  Securely message with Tango (more info)  Text page via Fly[employer] Paging/Directory   ______________________________________________________________________    Interval History   Had near fall overnight, poor insight into her weakness and not particularly motivated to get stronger. Still requiring oxygen, denies significant sob and no cough or CP    Physical Exam   Vital Signs: Temp: 97  F (36.1  C) Temp src: Oral BP: 102/55 Pulse: 55   Resp: 18 SpO2: 94 % O2 Device: Nasal cannula Oxygen Delivery: 1 LPM  Weight: 158 lbs 11.7 oz    Constitutional: awake, alert, and cooperative  Eyes: pupils equal, round and reactive to light and conjunctiva normal  ENT: normocepalic, without obvious abnormality, atramatic  Respiratory: no increased work of breathing, diffusely diminished breath sounds,  no crackles or wheezing  Cardiovascular: regular rate and rhythm, no murmur noted, and no edema  GI: normal bowel sounds, soft, non-distended, and non-tender, ostomy  Skin: no bruising or bleeding  Neurologic: alert, interactive, moving all extremities    45 MINUTES SPENT BY ME on the date of service doing chart review, history, exam, documentation & further activities per the note.      Data   ------------------------- PAST 24 HR DATA REVIEWED -----------------------------------------------    I have personally reviewed the following data over the past 24 hrs:    N/A  \   N/A   / N/A     N/A N/A N/A /  N/A   3.9 N/A N/A \       Imaging results reviewed over the past 24 hrs:   No results found for this or any previous visit (from the past 24  hour(s)).

## 2024-02-13 NOTE — PROGRESS NOTES
"[facility] RCAT     Date: 02/13/24  Admission Dx: Respiratory Failure/COPD exacerbation  Pulmonary History: COPD hx, Pulmonary nodule  Home Nebulizer/MDI Use: Albuterol MDI Q4 prn, Albuterol neb Q4 prn, Breo every day, Duoneb Q4 prn, Xopenex Q4 prn, Sodium Chloride BID  Home Oxygen: Has a oxygen tank at home, however, normally does not use  Acuity Level (RCAT flow sheet): 3   Aerosol Therapy initiated: Albuterol MDI Q4 prn, Albuterol neb Q4 prn, Breo every day, Duoneb QID, Sodium Chloride Q3 prn      Pulmonary Hygiene initiated: Coughing techniques      Volume Expansion initiated: Incentive Spirometry      Current Oxygen Requirements: 1 LPM NC  Current SpO2: 97%    Re-evaluation date: 02/16/24    Patient Education: Discussed use and benefits of respiratory medications.       See \"RT Assessments\" flow sheet for patient assessment scoring and Acuity Level Details.           "

## 2024-02-13 NOTE — PLAN OF CARE
Goal Outcome Evaluation:      Plan of Care Reviewed With: patient    Overall Patient Progress: no changeOverall Patient Progress: no change    Outcome Evaluation: .      Orientation: x4  VS: Temp: 98.3  F (36.8  C) Temp src: Oral BP: 108/58 Pulse: 64   Resp: 18 SpO2: 91 % O2 Device: Nasal cannula Oxygen Delivery: 3 LPM   Resp: 3 LPM NC  Pain: Denied pain overnight  Tele: NA  Activity: A2 SS  Diet: Regular  : WDL  GI: Colostomy Bag  Skin: WDL  LDAs: Right PIV, Saline Locked.  Labs/Protocols: K+, Mag, Phos protocols - AM Recheck.    Plan: Likely TCU upon discharge.

## 2024-02-13 NOTE — PLAN OF CARE
Pt A&Ox4. Denies pain. A1-2 with pivots from chair to bed or BSC. On 1L NC at 94%. K, phos, and Mag protocols - recheck in the AM. Colostomy - WOC saw patient today. PT/OT are recommending TCU. Pt is wanting to go home. Discharge in the 1-2 days pending on patients improvement.     Goal Outcome Evaluation:      Plan of Care Reviewed With: patient    Overall Patient Progress: no changeOverall Patient Progress: no change

## 2024-02-13 NOTE — DISCHARGE INSTRUCTIONS
You have been accepted by [facility] for home therapies and nursing. They will contact you to begin services tomorrow 2/15. Orders have been sent to them. If you have not heard from them by tomorrow or have questions or concerns please contact them: [facility] [phone].

## 2024-02-13 NOTE — PLAN OF CARE
"Meliton Wiley, RN : Progress Note  Shift : [id number]  Category of Care: Inpatient  /58   Pulse 64   Temp 98.3  F (36.8  C) (Oral)   Resp 18   Ht 1.6 m (5' 3\")   Wt 72 kg (158 lb 11.7 oz)   SpO2 91%   BMI 28.12 kg/m      (( Vitals: O2: Tele: BG: Orientation: Pain: Activity: Lines \ Drains: Lungs: GI: : Diet:  How to take meds: Protocols: Skin: Considerations: Plan: ))    Shift Summary; Occurences, Discussions & Interventions of Note : Pt on 3L for most of day 90%+. While sleeping pt dropped to mid 80s, I increased O2 to 4L to sat above 90. No report of SOB. Pt has a colostomy, changed twice today due to leakage, pt manages the colostomy independently, but may ask for supplies or help placing final seal piece on bottom. Lung sounds dim. Redness of bottom and analia. Pt complained of abdominal discomfort, and requested tylenol, tylenol given, and effective for pt.     Event: While transferring pt out of shower chair, with assist of 2, gait belt, we helped pt take 2 steps forward out of shower, and then into the sera steady which was as close to the shower as possible. Pt successfully stepped out of the shower area with assistance, but with a final step the pt lost all leg strength and had to be slowly helped to the floor. With pt sitting on the bottom of the sera steady, they were rolled out of the bathroom, and then lifted from the floor to bed using ceiling lift. No harm reached patient. Pt should do bed baths until more consistent strength is developed.    Considerations : Strong assist of 2, sera steady. Limit more than necessary steps to board the sera steady, at this time.     Plan : Pt will likely DC with oxygen. Likely TCU given pt's ongoing ambulation weakness.      Goal Outcome Evaluation:      Plan of Care Reviewed With: patient    Overall Patient Progress: improvingOverall Patient Progress: improving           "

## 2024-02-13 NOTE — PROGRESS NOTES
"[facility] Nurse Inpatient Assessment     Consulted for: Colostomy    Patient History (according to provider note(s):      Lara Melendez is a 58 year old female with a past medical history of COPD, atrial fibrillation, chronic anticoagulation with Eliquis, hypothyroidism, hypertension and anxiety with multiple recent admissions for COPD exacerbation who presents with shortness of breath, cough and wheezing.       Assessment:      Areas visualized during today's visit: Focused:    Assessment of established loop Colostomy:  Diagnosis Pertinent to Stoma: Bowel Obstruction      Surgery Date: 7/12/23  Surgeon:[facility]: [facility]  Pouching system in place on assessment today: Michael one piece, cut to fit, barrier ring, and no sting  Pouch barrier status: intact  Pouch last changed/wear time: 2-3 days  Reason for pouch change today: pouch not changed today-patient changed pouch yesterday and transfer belt pulled on it causing a leak  Effectiveness of current pouching/ supply plan: Effective  Change made with ostomy management today: No  Supplies: ordered pouches, rings  Last photo: 2/6/24    Stoma location: Upper midline  Stoma size: approximately 1 3/4 inches, protrudes 5cm  Stoma appearance: pink-red and prolapsed  Mucocutaneous junction:  intact on 2/6/24  Peristomal complication(s): Patient reports skin is now intact with no redness   Output: thin and brown  Output volume emptied during visit: 0ml  Abdominal assessment: Soft    Face to face time: 10 minutes    Patient independent with pouch changes, no longer having skin irritation, will sign off.       Treatment Plan:     Midline Colostomy pouching plan:   Pouching system: ostomy supplies pouches: [place] FECAL (192344)   Accessories used: [facility] ostomy accessories: 2\" Cera Barrier Ring ([id number]) and Cavilon no sting barrier film ([id number])   Frequency of pouch changes: Twice weekly  [facility] follow up plan:  signing " off  Bedside RN interventions: Change pouch PRN if leaking using the supplies above, Empty pouch when 1/3 to 1/2 full, ensure to clean pouch outlet after emptying to prevent odor, and Notify WOC for ongoing pouch leakage      Orders: Reviewed    DATA:     Current support surface: Standard  Standard gel/foam mattress (IsoFlex, Atmos air, etc)  Containment of urine/stool: Colostomy pouch  BMI: Body mass index is 28.12 kg/m .   Active diet order: Orders Placed This Encounter      Combination Diet Regular Diet; Thin Liquids (level 0) (small bites/sips, sit upright for PO, pace slowly when eating/drinking)     Output: I/O last 3 completed shifts:  In: -   Out: 101 [Urine:1; Stool:100]     Labs:   Recent Labs   Lab 02/08/24  0630   HGB 10.5*   WBC 15.9*     Pressure injury risk assessment:   Sensory Perception: 4-->no impairment  Moisture: 4-->rarely moist  Activity: 2-->chairfast  Mobility: 3-->slightly limited  Nutrition: 3-->adequate  Friction and Shear: 3-->no apparent problem  Lee Score: 19    [facility] [facility]  Dept. Office Number: [phone]

## 2024-02-14 ENCOUNTER — APPOINTMENT (OUTPATIENT)
Dept: PHYSICAL THERAPY | Facility: CLINIC | Age: 59
End: 2024-02-14
Payer: COMMERCIAL

## 2024-02-14 VITALS
SYSTOLIC BLOOD PRESSURE: 114 MMHG | DIASTOLIC BLOOD PRESSURE: 51 MMHG | OXYGEN SATURATION: 95 % | HEIGHT: 63 IN | BODY MASS INDEX: 28.12 KG/M2 | RESPIRATION RATE: 18 BRPM | TEMPERATURE: 97.8 F | WEIGHT: 158.73 LBS | HEART RATE: 64 BPM

## 2024-02-14 LAB
HOLD SPECIMEN: NORMAL
MAGNESIUM SERPL-MCNC: 2.1 MG/DL (ref 1.7–2.3)
PHOSPHATE SERPL-MCNC: 3.1 MG/DL (ref 2.5–4.5)
POTASSIUM SERPL-SCNC: 4.4 MMOL/L (ref 3.4–5.3)

## 2024-02-14 PROCEDURE — 84132 ASSAY OF SERUM POTASSIUM: CPT | Performed by: HOSPITALIST

## 2024-02-14 PROCEDURE — 97530 THERAPEUTIC ACTIVITIES: CPT | Mod: GP | Performed by: PHYSICAL THERAPIST

## 2024-02-14 PROCEDURE — 250N000013 HC RX MED GY IP 250 OP 250 PS 637: Performed by: STUDENT IN AN ORGANIZED HEALTH CARE EDUCATION/TRAINING PROGRAM

## 2024-02-14 PROCEDURE — 99239 HOSP IP/OBS DSCHRG MGMT >30: CPT | Performed by: INTERNAL MEDICINE

## 2024-02-14 PROCEDURE — 36415 COLL VENOUS BLD VENIPUNCTURE: CPT | Performed by: HOSPITALIST

## 2024-02-14 PROCEDURE — 84100 ASSAY OF PHOSPHORUS: CPT | Performed by: HOSPITALIST

## 2024-02-14 PROCEDURE — 250N000009 HC RX 250: Performed by: STUDENT IN AN ORGANIZED HEALTH CARE EDUCATION/TRAINING PROGRAM

## 2024-02-14 PROCEDURE — 94640 AIRWAY INHALATION TREATMENT: CPT

## 2024-02-14 PROCEDURE — 83735 ASSAY OF MAGNESIUM: CPT | Performed by: HOSPITALIST

## 2024-02-14 PROCEDURE — 250N000013 HC RX MED GY IP 250 OP 250 PS 637: Performed by: INTERNAL MEDICINE

## 2024-02-14 PROCEDURE — 999N000157 HC STATISTIC RCP TIME EA 10 MIN

## 2024-02-14 RX ORDER — METOPROLOL TARTRATE 37.5 MG/1
37.5 TABLET, FILM COATED ORAL 2 TIMES DAILY
Qty: 30 TABLET | Refills: 0 | Status: SHIPPED | OUTPATIENT
Start: 2024-02-14

## 2024-02-14 RX ORDER — AMIODARONE HYDROCHLORIDE 200 MG/1
200 TABLET ORAL DAILY
Status: ON HOLD | COMMUNITY
Start: 2024-02-14 | End: 2024-08-09

## 2024-02-14 RX ORDER — TIOTROPIUM BROMIDE 18 UG/1
18 CAPSULE ORAL; RESPIRATORY (INHALATION) DAILY
Qty: 60 CAPSULE | Refills: 0 | Status: SHIPPED | OUTPATIENT
Start: 2024-02-14

## 2024-02-14 RX ADMIN — ATORVASTATIN CALCIUM 20 MG: 20 TABLET, FILM COATED ORAL at 08:53

## 2024-02-14 RX ADMIN — METOPROLOL TARTRATE 37.5 MG: 25 TABLET, FILM COATED ORAL at 08:53

## 2024-02-14 RX ADMIN — FLUTICASONE FUROATE AND VILANTEROL 1 PUFF: 200; 25 POWDER RESPIRATORY (INHALATION) at 08:22

## 2024-02-14 RX ADMIN — LEVOTHYROXINE SODIUM 112 MCG: 0.11 TABLET ORAL at 08:53

## 2024-02-14 RX ADMIN — AMIODARONE HYDROCHLORIDE 200 MG: 200 TABLET ORAL at 08:53

## 2024-02-14 RX ADMIN — IPRATROPIUM BROMIDE AND ALBUTEROL SULFATE 3 ML: .5; 3 SOLUTION RESPIRATORY (INHALATION) at 08:22

## 2024-02-14 RX ADMIN — CLONAZEPAM 0.5 MG: 0.5 TABLET ORAL at 08:54

## 2024-02-14 RX ADMIN — APIXABAN 5 MG: 5 TABLET, FILM COATED ORAL at 08:53

## 2024-02-14 RX ADMIN — Medication 1 TABLET: at 08:53

## 2024-02-14 RX ADMIN — PANTOPRAZOLE SODIUM 40 MG: 40 TABLET, DELAYED RELEASE ORAL at 06:37

## 2024-02-14 RX ADMIN — PAROXETINE HYDROCHLORIDE 20 MG: 20 TABLET, FILM COATED ORAL at 08:53

## 2024-02-14 ASSESSMENT — ACTIVITIES OF DAILY LIVING (ADL)
ADLS_ACUITY_SCORE: 30
ADLS_ACUITY_SCORE: 29
ADLS_ACUITY_SCORE: 29
ADLS_ACUITY_SCORE: 28

## 2024-02-14 NOTE — PLAN OF CARE
Goal Outcome Evaluation:      Plan of Care Reviewed With: patient    Overall Patient Progress: no changeOverall Patient Progress: no change     Pt. A&Ox4, up with assist of 2 and delfin escalante, denies any c/o pain this shift, O2 at 2L with sat's at 94%. Colostomy intact. VSS. Pt. Denies any needs at this time. Will continue with POC.

## 2024-02-14 NOTE — PLAN OF CARE
Goal Outcome Evaluation:       Pt alert and orientated x4. Up with Assist of 2 with delfin steady to bathroom. Pt is on combo reg diet with thin liquids,  no dinner ordered. K+ Mg+ Phos protocols, recheck in am. Pt on 1 LPM NC to keep sats at 94. Pt has colostomy bag, cares for herself.  Pt has deconditioned since in hospital, weak. She is able to pivot to bedside commode if needed. Pt wanting to go home with . Sound like maybe TCU.

## 2024-02-14 NOTE — PROGRESS NOTES
Care Management Discharge Note    Discharge Date: 02/14/2024       Discharge Disposition: Home Care, Home with spouse    [facility]: [facility]/OT    Discharge Transportation: family or friend will provide, car, drives self    Patient/Family in Agreement with the Plan: yes    Handoff Referral Completed: no    Additional Information:  CM following for discharge planning. Call received this am from Subha [phone] at [facility] requesting an update on patient discharge plan of care. She states they are planning to start care tomorrow at patient's home. Orders are now in place for discharge home with home care services. Orders sent via [employer] to [facility]. Call placed to them and updated them on discharge plan for today. AVS updated. Patient's spouse will transport home. No additional needs at this time.          Chyna Christian RN BSN CM  Inpatient Care Coordination  [facility]  [phone]

## 2024-02-14 NOTE — PLAN OF CARE
Occupational Therapy Discharge Summary    Reason for therapy discharge:    Discharged to home with home therapy.    Progress towards therapy goal(s). See goals on Care Plan in [facility] electronic health record for goal details.  Goals not met.  Barriers to achieving goals:   discharge from facility.    Therapy recommendation(s):    Continued therapy is recommended.  Rationale/Recommendations:  OT recommending TCU however pt declining. Recommend Ax1 with all ADLs/IADLs and functional transfers. Recommend pivot transfers only. Pt may need BSC and w/c for home mobility.

## 2024-02-14 NOTE — PLAN OF CARE
"/51 (BP Location: Left arm, Patient Position: Semi-Dorsey's, Cuff Size: Adult Regular)   Pulse 64   Temp 97.8  F (36.6  C) (Oral)   Resp 18   Ht 1.6 m (5' 3\")   Wt 72 kg (158 lb 11.7 oz)   SpO2 94%   BMI 28.12 kg/m      Pt A&Ox4. Denies pain. Lung sounds clear and dim. On RA at 94%. A2 delfin steady, can pivot A2 with BSC. Colostomy. On eliquis, metoprolol and amio.  PT/OT following. Plan is for pt to discharge home today with home care. Discharge medications send to home pharmacy.  is providing transport.     Patient has been assessed for Home Oxygen needs.     Pulse oximetry (SpO2) and Oxygen flow readings:    SpO2 =  95% on room air at rest while awake.    SpO2 improved to N/A % on   liters/minute at rest.    SpO2 =  91% on room air during activity/with exercise.    *SpO2 improved to  N/A% on   liters/minute during activity/with exercise.        Goal Outcome Evaluation:      Plan of Care Reviewed With: patient    Overall Patient Progress: no changeOverall Patient Progress: no change           "

## 2024-02-15 NOTE — PLAN OF CARE
Physical Therapy Discharge Summary    Reason for therapy discharge:    Discharged to home with home therapy.    Progress towards therapy goal(s). See goals on Care Plan in [facility] electronic health record for goal details.  Goals partially met.  Barriers to achieving goals:   limited tolerance for therapy and discharge from facility.    Therapy recommendation(s):    Continued therapy is recommended.  Rationale/Recommendations:  Home with assist recommended given patient's refusal for TCU. Reports having assist of spouse.

## 2024-02-15 NOTE — DISCHARGE SUMMARY
Physician Discharge Summary           [facility]    Name: Lara Melendez    MRN: [id number]     YOB: 1965    Age: 58 year old                                                     Primary care provider: Sudhir Carroll    Admit date:  1/22/2024    Discharge date and time: 2/14/2024 12:43 PM    Discharge Physician: Galdino Devine M.D., M.B.A.       Primary Discharge Diagnosis    Acute hypoxemic hypercarbic respiratory failure 2/2 severe COPD Exacerbation (improved/resolved)  Severe COPD  Previous tracheostomy  Mechanical intubation required this admission, extubated 2/5/2024  Afib with RVR  Hypokalemia  Generalized weakness/deconditioning    Secondary Diagnosis /chronic medical conditions     Past Medical History:   Diagnosis Date    Anxiety     COPD (chronic obstructive pulmonary disease)     Diverticulitis of colon     Hypercholesterolemia     Hypertension     Hypothyroidism     Infection due to 2019 novel coronavirus 05/14/2022    Paroxysmal atrial fibrillation     Psoriasis     Pulmonary nodule - left upper lobe      Past Surgical History:  Past Surgical History:   Procedure Laterality Date    CHOLECYSTECTOMY      COLOSTOMY N/A 7/12/2023    Procedure: OPENING DIVERTING COLOSTOMY;  Surgeon: Jaspal Rashid MD;  Location: [facility] OR    INCISION AND DRAINAGE MANDIBLE, COMBINED Left 01/10/2022    Procedure: INCISION AND DRAINAGE, MANDIBLE;  Surgeon: Jn Feliz DDS;  Location: [facility] OR    INCISION AND DRAINAGE MANDIBLE, COMBINED Left 02/04/2022    Procedure: INCISION AND DRAINAGE, MANDIBLE;  Surgeon: Taylor Ortez DDS;  Location: [facility] OR    TOOTH EXTRACTION      Vocal Cord surgery             Brief Summary of Hospital stay :       Please refer to  Admission H&P note  and subsequent progress notes in EMR for full details of patient care.    Reason for Hospitalization(C/C,HPI and brief patient summary):Shortness of breath, wheezing and cough        Significant findings(Primary diagnosis )Procedures and treatments provided(Hospital course ,consults, procedures):Please see below for details    Lara Melendez is a  58 year old female with a past medical history of COPD, atrial fibrillation, chronic anticoagulation with Eliquis, hypothyroidism, hypertension and anxiety with multiple recent admissions for COPD exacerbation, but again for another COPD exacerbation, now intubated.  Regular attempts on weaning off mechanical ventilation.  Extubated on 2/5/2024.  Transferred out of ICU to the medical floor       Problem list (medical problems addressed during hospital stay):    Acute hypoxemic hypercarbic respiratory failure 2/2 severe COPD Exacerbation (improved/resolved)  Severe COPD  Previous tracheostomy  Mechanical intubation required this admission, extubated 2/5/2024  --Presented with COPD exacerbation; has had frequent exacerbations requiring admission, with this being her third admission in 3 weeks. She presented again with COPD exacerbation. she continued to experience air hunger and a feeling of anxiety/distress, despite seemingly adequate oxygenation and stable gases. She had received continuous nebs, nebs scheduled q2h, steroids, azithromycin, and benzos without relief. She went into AF with RVR  1/28 and was intubated for dyspnea, tachypnea. Really patient was not improving and thought about a potential tracheostomy. Patient was a started on IV diuresis, which worked really well for her. Extubated on 2/5/2024.  -PTA meds include breo and prn duonebs, no maintenance anticholinergic and not on any meds to reduce exacerbations.   -Seen by pulmonary. Appreciate input  -Initially given IV solumedrol and subsequently transitioned to oral prednisone.   -continued prednisone 40 mg burst for 4 days (completed on 2/8).   -completed diuresis with IV lasix 20 mg BID (stopped on 2/8)  -Continue duonebs QID  -Will need follow up with pulmonary after discharge.   Patient reports she already has a pulmonologist follow-up scheduled in April  -She was doing well and did not require oxygen on discharge     Pulmonary recommendations for discharge:   -Begin LAMA inhaler at discharge such as Spiriva  -Follow-up with pulmonology after discharge (pt already sees a pulmonologist as outpatient and has appt scheduled in April)  -Consider steroid nebs rather than inhaler at discharge to increase pulmonary steroid delivery  -Consider evaluation for inflammatory/immune trigger (eosinophils, IgE) as an outpatient when off prednisone     Afib with RVR (improved)  -On both rate and rhythm control. On DOAC as well.  -Continue amiodarone 200 mg daily, need alternative agent as amiodarone not a good option in the long run given significant underlying COPD  -Continue metoprolol to 37.5 mg twice daily, with holding parameters. Patient may need down titration if continues to be bradycardic and/or hypotensive.     Borderline hypotension  -losartan stopped     Hypokalemia (resolved)  -Due to diuresis  -Continue potassium supplementation protocol     Generalized weakness/deconditioning  -Patient evaluated by rehabilitation therapy  -Patient declining TCU.  Patient's daughter Danica was made aware of this during her visit on 2/8        Consultations during hospital stay:       RESPIRATORY CARE IP CONSULT  CARE MANAGEMENT / SOCIAL WORK IP CONSULT  WOUND OSTOMY CONTINENCE NURSE  IP CONSULT  PULMONARY IP CONSULT  CARDIOLOGY IP CONSULT  NUTRITION SERVICES ADULT IP CONSULT  VASCULAR ACCESS ADULT IP CONSULT  VASCULAR ACCESS ADULT IP CONSULT  PHARMACY IP CONSULT  SPIRITUAL HEALTH SERVICES IP CONSULT  SPEECH LANGUAGE PATH ADULT IP CONSULT  PHYSICAL THERAPY ADULT IP CONSULT  OCCUPATIONAL THERAPY ADULT IP CONSULT  NURSING TO CONSULT FOR VASCULAR ACCESS CARE IP CONSULT  SPIRITUAL HEALTH SERVICES IP CONSULT      Patient discharge Condition:     stable    /51 (BP Location: Left arm, Patient Position:  "Semi-Dorsey's, Cuff Size: Adult Regular)   Pulse 64   Temp 97.8  F (36.6  C) (Oral)   Resp 18   Ht 1.6 m (5' 3\")   Wt 72 kg (158 lb 11.7 oz)   SpO2 95%   BMI 28.12 kg/m       Discharge Instructions:       Patient/family instructions: Written discharge instruction given to patient/family    Discharge Medications:       Review of your medicines        START taking        Dose / Directions   Metoprolol Tartrate 37.5 MG Tabs  Used for: Atrial fibrillation with rapid ventricular response (H)      Dose: 37.5 mg  Take 37.5 mg by mouth 2 times daily  Quantity: 30 tablet  Refills: 0     tiotropium 18 MCG inhaled capsule  Commonly known as: SPIRIVA  Used for: Acute and chronic respiratory failure with hypercapnia (H)      Dose: 18 mcg  Inhale 1 capsule (18 mcg) into the lungs daily  Quantity: 60 capsule  Refills: 0            CONTINUE these medicines which have NOT CHANGED        Dose / Directions   * albuterol 108 (90 Base) MCG/ACT inhaler  Commonly known as: PROAIR HFA/PROVENTIL HFA/VENTOLIN HFA  Used for: Chronic obstructive pulmonary disease, unspecified COPD type (H)      Dose: 2 puff  Inhale 2 puffs into the lungs every 4 hours as needed for shortness of breath / dyspnea or wheezing Inhale 1-2 Puffs every 4 hours as needed for Wheezing.  Quantity: 18 g  Refills: 0     * albuterol (2.5 MG/3ML) 0.083% neb solution  Commonly known as: PROVENTIL  Used for: COPD exacerbation (H)      Dose: 2.5 mg  Take 1 vial (2.5 mg) by nebulization every 4 hours as needed for shortness of breath or wheezing  Quantity: 30 mL  Refills: 0     amiodarone 200 MG tablet  Commonly known as: PACERONE  Used for: Paroxysmal atrial fibrillation with RVR (H)      Dose: 200 mg  Take 1 tablet (200 mg) by mouth daily  Refills: 0     apixaban ANTICOAGULANT 5 MG tablet  Commonly known as: ELIQUIS  Indication: Atrial Fibrillation Not Caused By A Heart Valve Problem  Used for: Atrial fibrillation with rapid ventricular response (H)      Dose: 5 " mg  Take 1 tablet (5 mg) by mouth 2 times daily  Quantity: 60 tablet  Refills: 0     atorvastatin 20 MG tablet  Commonly known as: LIPITOR      Dose: 20 mg  Take 20 mg by mouth daily  Refills: 0     clonazePAM 0.5 MG tablet  Commonly known as: klonoPIN      Dose: 0.5 mg  Take 0.5 mg by mouth daily  Refills: 0     fluticasone-vilanterol 200-25 MCG/ACT inhaler  Commonly known as: BREO ELLIPTA      Dose: 1 Dose  Inhale 1 Dose into the lungs daily  Refills: 0     ipratropium - albuterol 0.5 mg/2.5 mg/3 mL 0.5-2.5 (3) MG/3ML neb solution  Commonly known as: DUONEB  Used for: COPD exacerbation (H)      Dose: 3 mL  Take 1 vial (3 mLs) by nebulization every 6 hours as needed for shortness of breath / dyspnea or wheezing  Quantity: 90 mL  Refills: 1     levalbuterol 0.63 MG/3ML neb solution  Commonly known as: XOPENEX      Dose: 0.63 mg  Inhale 0.63 mg into the lungs every 4 hours as needed  Refills: 0     levothyroxine 112 MCG tablet  Commonly known as: SYNTHROID/LEVOTHROID      Dose: 112 mcg  Take 112 mcg by mouth daily  Refills: 0     PARoxetine 20 MG tablet  Commonly known as: PAXIL      Dose: 20 mg  Take 20 mg by mouth daily  Refills: 0     sodium chloride 3 % neb solution  Commonly known as: NEBUSAL      Dose: 4 mL  Inhale 4 mLs into the lungs 2 times daily  Refills: 0           * This list has 2 medication(s) that are the same as other medications prescribed for you. Read the directions carefully, and ask your doctor or other care provider to review them with you.                STOP taking      losartan 25 MG tablet  Commonly known as: COZAAR                  Where to get your medicines        These medications were sent to [place] [id number] [place] [facility] [address] [facility]  [address] [facility] [address]      Hours: Tech issues with their phone system Phone: [phone]   Metoprolol Tartrate 37.5 MG Tabs  tiotropium 18 MCG inhaled capsule          Discharge diet:Orders Placed This Encounter       Diet    regular diet      Discharge activity:Activity as tolerated      Discharge follow-up:    Follow up with primary care provider in 7 days or earlier if symptoms return or gets worse.    Follow up with consultant as instructed  with  pulmonary medicine       Other instructions:    We discussed with patient/family about detail discharge instructions as well as discharge medications above including potential risks,side effects and benefits.Patient/family understood benefits and potential serious side effects of taking these medications and need to follow up with PCP if the patient develops complications.  Patient is also advised to see a doctor immediately for severe symptoms.        Major procedure performed/  Significant Diagnostic Studies:       Results for orders placed or performed during the hospital encounter of 01/22/24   XR Chest Port 1 View    Narrative    CHEST ONE VIEW  1/22/2024 3:47 PM     HISTORY: Shortness of breath.    COMPARISON: January 9, 2024      Impression    IMPRESSION: No acute disease.     GHAZALA ORELLANA MD         SYSTEM ID:  [id number]   XR Chest Port 1 View    Narrative    CHEST PORTABLE ONE VIEW   1/26/2024 11:26 AM     HISTORY: COPD exacerbation.  Shortness of breath.     COMPARISON: 1/22/2024.      Impression    IMPRESSION: No focal acute airspace disease. Stable bilateral vascular  and interstitial prominence. Normal cardiac silhouette. Stable  ill-defined calcifications consistent with a granulomatous process at  the left apex.     MEHUL HOWE MD         SYSTEM ID:  [id number]   XR Chest Port 1 View    Narrative    EXAM: XR CHEST PORT 1 VIEW  LOCATION: [facility]  DATE: 1/28/2024    INDICATION: SOB  COMPARISON: 01/09/2024      Impression    IMPRESSION: No change. Heart size and pulmonary vessels are normal. Lungs appear mildly hyperinflated. Grouping of tiny calcifications medial left apex unchanged. No new pneumonia.   XR Chest Port 1 View    Narrative    EXAM:  XR CHEST PORT 1 VIEW  LOCATION: [facility]  DATE: 1/28/2024    INDICATION: Endotracheal tube positioning  COMPARISON: 01/28/2024      Impression    IMPRESSION: Stable cardiomediastinal silhouette. Endotracheal tube 5 cm above stevie. Interstitial prominence suggesting mild edema. Small coarse calcifications left apex unchanged. 9 mm nodular density right upper lobe level of anterior  2. Uncertain if this is confluence. Pneumonitis, nodule or granuloma not excluded. Short-term follow-up recommended. Atherosclerotic aorta.   XR Chest Port 1 View    Narrative    EXAM: XR CHEST PORT 1 VIEW  LOCATION: [facility]  DATE: 1/28/2024    INDICATION: RN placed PICC   verify tip placement  COMPARISON: Chest radiograph 01/28/2024      Impression    IMPRESSION: Stable size of cardiomediastinal silhouette. Placement of left upper extremity PICC, tip appears doubled back upon itself overlying the superior vena cava although azygous positioning is also possible, consider slight retraction   (approximately 2 cm). Stable position of endotracheal tube. Nasogastric tube courses below the diaphragm with sidehole overlying the stomach, tip beyond the field-of-view. No new airspace disease, pleural effusion or pneumothorax. Bones are unchanged.   XR Abdomen Port 1 View    Narrative    EXAM: XR ABDOMEN PORT 1 VIEW  LOCATION: [facility]  DATE/TIME: 1/28/2024 11:45 AM CST    INDICATION: Enteric tube placement  COMPARISON: 07/20/2023, CT abdomen and pelvis from 07/11/2023      Impression    IMPRESSION:   Enteric tube terminates at the level of the gastric body. The last sidehole port terminates at the level of the gastroesophageal junction. The visualized small bowel is dilated up to 3.9 cm. In the right upper quadrant there is a hyperdense ovoid   masslike density which could represent hyperdense contents in the mildly distended colon at the hepatic flexure but this is  "incompletely evaluated. CT of the abdomen and pelvis is recommended for further evaluation. Cholecystectomy. No free air.    XR Chest Port 1 View    Narrative    CHEST ONE VIEW January 29, 2024 9:28 AM     HISTORY: Question of PICC line position.    COMPARISON: Chest radiograph 1/28/2024.      Impression    IMPRESSION:   Lines and tubes: Left PICC tip appears repositioned, now projecting  over the mid/lower SVC. Endotracheal tube tip 3.5 cm above the stevie.  Gastric drainage tube tip courses below the diaphragm.    Similar calcifications within the left lung apex. No new focal  consolidation, pleural effusion or pneumothorax. Unchanged  cardiomediastinal silhouette.    LOY DOE MD         SYSTEM ID:  [id number]   XR Chest Port 1 View    Narrative    XR CHEST PORT 1 VIEW 2/5/2024 9:48 AM    HISTORY: Follow up know acute hypoxic respiratory failure, difficult  to extubate, with previous history of pneumothorax    COMPARISON: 1/29/2024      Impression    IMPRESSION: Endotracheal tube tip is 4.5 cm above the stevie, left arm  PICC tip is at the SVC/right atrial junction, and enteric tube courses  into the stomach, tip outside the field of view. Stable small  calcified foci projecting over the left upper lobe. Slight increase in  mild patchy opacities in the right lung base, likely related to  pneumonia. Stable mild interstitial opacities in the lungs, possibly  due to mild pulmonary edema. No pleural effusion or pneumothorax. Mild  cardiomegaly stable.    GREER MONTES MD         SYSTEM ID:  [id number]       No results for input(s): \"WBC\", \"HGB\", \"HCT\", \"MCV\", \"PLT\" in the last 168 hours.  No results for input(s): \"CULT\" in the last 168 hours.  Recent Labs   Lab 02/14/24  0712 02/13/24  0711 02/12/24  0731 02/10/24  1956 02/10/24  0705 02/09/24  1556 02/09/24  0738   NA  --   --   --   --  135  --  138   POTASSIUM 4.4 3.9 3.9   < > 3.4   < > 2.8*   CHLORIDE  --   --   --   --  99  --  97*   CO2  --   --   -- " "  --  29  --  30*   ANIONGAP  --   --   --   --  7  --  11   GLC  --   --   --   --  81  --  86   BUN  --   --   --   --  10.0  --  16.1   CR  --   --   --   --  0.76  --  0.77   GFRESTIMATED  --   --   --   --  89  --  88   EDIN  --   --   --   --  7.7*  --  7.8*   MAG 2.1 2.1 2.0   < > 1.8  --  1.7   PHOS 3.1 3.1 2.7   < > 2.3*   < > 1.8*    < > = values in this interval not displayed.       Recent Labs   Lab 02/10/24  0705 02/09/24  0738   GLC 81 86       No results for input(s): \"INR\" in the last 168 hours.      Pending Results:       Unresulted Labs Ordered in the Past 30 Days of this Admission       No orders found from 12/23/2023 to 1/23/2024.               Patient Allergies:       Allergies   Allergen Reactions    Sulfa Antibiotics     Doxycycline Other (See Comments)     Develops chest tightness  Intolerance not allergy    Penicillins Rash     Generalized rash  Rash, Generalized           Disposition:     Disposition: home      I saw and evaluated the patient on day of discharge and  discharge instructions reviewed  and  all the patient's questions and concerns addressed. Over 30 minutes spent on discharge and coordination of discharge process for this patient.      Disclaimer: This note consists of symbols derived from keyboarding, dictation and/or voice recognition software. As a result, there may be errors in the script that have gone undetected. Please consider this when interpreting information found in this chart      "

## 2024-02-16 ENCOUNTER — PATIENT OUTREACH (OUTPATIENT)
Dept: CARE COORDINATION | Facility: CLINIC | Age: 59
End: 2024-02-16
Payer: COMMERCIAL

## 2024-02-16 NOTE — PROGRESS NOTES
Connected Care Resource Center:   [facility] Contact  [facility]/Voicemail     Clinical Data: Post-Discharge Outreach     Outreach attempted x 2.  Left message on patient's voicemail, providing [facility]'s central phone number of [phone] ([phone]) for questions/concerns and/or to schedule an appt with an [facility] provider, if they do not have a PCP.      Plan:  [facility] will do no further outreaches at this time.       STANLEY Franklin  Connected Care Resource [place], [facility]    *Connected Care Resource Team does NOT follow patient ongoing. Referrals are identified based on internal discharge reports and the outreach is to ensure patient has an understanding of their discharge instructions.

## 2024-06-01 ENCOUNTER — HEALTH MAINTENANCE LETTER (OUTPATIENT)
Age: 59
End: 2024-06-01

## 2024-08-08 ENCOUNTER — HOSPITAL ENCOUNTER (INPATIENT)
Facility: CLINIC | Age: 59
LOS: 10 days | Discharge: HOME | End: 2024-08-19
Attending: EMERGENCY MEDICINE | Admitting: INTERNAL MEDICINE
Payer: COMMERCIAL

## 2024-08-08 ENCOUNTER — APPOINTMENT (OUTPATIENT)
Dept: GENERAL RADIOLOGY | Facility: CLINIC | Age: 59
End: 2024-08-08
Attending: EMERGENCY MEDICINE
Payer: COMMERCIAL

## 2024-08-08 DIAGNOSIS — J96.01 ACUTE RESPIRATORY FAILURE WITH HYPOXIA AND HYPERCAPNIA (H): ICD-10-CM

## 2024-08-08 DIAGNOSIS — U07.1 COVID-19 VIRUS INFECTION: ICD-10-CM

## 2024-08-08 DIAGNOSIS — J96.02 ACUTE RESPIRATORY FAILURE WITH HYPOXIA AND HYPERCAPNIA (H): ICD-10-CM

## 2024-08-08 DIAGNOSIS — J96.00 ACUTE RESPIRATORY FAILURE, UNSPECIFIED WHETHER WITH HYPOXIA OR HYPERCAPNIA (H): ICD-10-CM

## 2024-08-08 DIAGNOSIS — K94.19 INTESTINAL STOMA PROLAPSE (H): ICD-10-CM

## 2024-08-08 DIAGNOSIS — J44.1 COPD WITH ACUTE EXACERBATION (H): ICD-10-CM

## 2024-08-08 DIAGNOSIS — Z43.3 ATTENTION TO COLOSTOMY (H): Primary | ICD-10-CM

## 2024-08-08 LAB
ANION GAP SERPL CALCULATED.3IONS-SCNC: 14 MMOL/L (ref 7–15)
BASE EXCESS BLDV CALC-SCNC: 0.2 MMOL/L (ref -3–3)
BASOPHILS # BLD AUTO: 0 10E3/UL (ref 0–0.2)
BASOPHILS NFR BLD AUTO: 0 %
BUN SERPL-MCNC: 6.7 MG/DL (ref 8–23)
CALCIUM SERPL-MCNC: 9 MG/DL (ref 8.8–10.4)
CHLORIDE SERPL-SCNC: 101 MMOL/L (ref 98–107)
CREAT SERPL-MCNC: 0.85 MG/DL (ref 0.51–0.95)
EGFRCR SERPLBLD CKD-EPI 2021: 78 ML/MIN/1.73M2
EOSINOPHIL # BLD AUTO: 0 10E3/UL (ref 0–0.7)
EOSINOPHIL NFR BLD AUTO: 0 %
ERYTHROCYTE [DISTWIDTH] IN BLOOD BY AUTOMATED COUNT: 13.6 % (ref 10–15)
FLUAV RNA SPEC QL NAA+PROBE: NEGATIVE
FLUBV RNA RESP QL NAA+PROBE: NEGATIVE
GLUCOSE SERPL-MCNC: 130 MG/DL (ref 70–99)
GROUP A STREP BY PCR: NOT DETECTED
HCO3 BLDV-SCNC: 27 MMOL/L (ref 21–28)
HCO3 SERPL-SCNC: 23 MMOL/L (ref 22–29)
HCT VFR BLD AUTO: 36 % (ref 35–47)
HGB BLD-MCNC: 11.5 G/DL (ref 11.7–15.7)
IMM GRANULOCYTES # BLD: 0.1 10E3/UL
IMM GRANULOCYTES NFR BLD: 1 %
LACTATE SERPL-SCNC: 1.5 MMOL/L (ref 0.7–2)
LYMPHOCYTES # BLD AUTO: 0.8 10E3/UL (ref 0.8–5.3)
LYMPHOCYTES NFR BLD AUTO: 12 %
MCH RBC QN AUTO: 29.3 PG (ref 26.5–33)
MCHC RBC AUTO-ENTMCNC: 31.9 G/DL (ref 31.5–36.5)
MCV RBC AUTO: 92 FL (ref 78–100)
MONOCYTES # BLD AUTO: 0.6 10E3/UL (ref 0–1.3)
MONOCYTES NFR BLD AUTO: 9 %
NEUTROPHILS # BLD AUTO: 4.9 10E3/UL (ref 1.6–8.3)
NEUTROPHILS NFR BLD AUTO: 78 %
NRBC # BLD AUTO: 0 10E3/UL
NRBC BLD AUTO-RTO: 0 /100
NT-PROBNP SERPL-MCNC: 565 PG/ML (ref 0–900)
O2/TOTAL GAS SETTING VFR VENT: 21 %
OXYHGB MFR BLDV: 90 % (ref 70–75)
PCO2 BLDV: 51 MM HG (ref 40–50)
PH BLDV: 7.33 [PH] (ref 7.32–7.43)
PLATELET # BLD AUTO: 249 10E3/UL (ref 150–450)
PO2 BLDV: 66 MM HG (ref 25–47)
POTASSIUM SERPL-SCNC: 4.1 MMOL/L (ref 3.4–5.3)
PROCALCITONIN SERPL IA-MCNC: 0.05 NG/ML
RBC # BLD AUTO: 3.93 10E6/UL (ref 3.8–5.2)
RSV RNA SPEC NAA+PROBE: NEGATIVE
SAO2 % BLDV: 93.4 % (ref 70–75)
SARS-COV-2 RNA RESP QL NAA+PROBE: POSITIVE
SODIUM SERPL-SCNC: 138 MMOL/L (ref 135–145)
WBC # BLD AUTO: 6.3 10E3/UL (ref 4–11)

## 2024-08-08 PROCEDURE — 96375 TX/PRO/DX INJ NEW DRUG ADDON: CPT

## 2024-08-08 PROCEDURE — 83605 ASSAY OF LACTIC ACID: CPT | Performed by: EMERGENCY MEDICINE

## 2024-08-08 PROCEDURE — 83880 ASSAY OF NATRIURETIC PEPTIDE: CPT | Performed by: EMERGENCY MEDICINE

## 2024-08-08 PROCEDURE — 93005 ELECTROCARDIOGRAM TRACING: CPT

## 2024-08-08 PROCEDURE — 94660 CPAP INITIATION&MGMT: CPT

## 2024-08-08 PROCEDURE — 71045 X-RAY EXAM CHEST 1 VIEW: CPT

## 2024-08-08 PROCEDURE — 80048 BASIC METABOLIC PNL TOTAL CA: CPT | Performed by: EMERGENCY MEDICINE

## 2024-08-08 PROCEDURE — 82805 BLOOD GASES W/O2 SATURATION: CPT | Performed by: EMERGENCY MEDICINE

## 2024-08-08 PROCEDURE — 87651 STREP A DNA AMP PROBE: CPT | Performed by: EMERGENCY MEDICINE

## 2024-08-08 PROCEDURE — 5A09357 ASSISTANCE WITH RESPIRATORY VENTILATION, LESS THAN 24 CONSECUTIVE HOURS, CONTINUOUS POSITIVE AIRWAY PRESSURE: ICD-10-PCS | Performed by: INTERNAL MEDICINE

## 2024-08-08 PROCEDURE — 250N000011 HC RX IP 250 OP 636: Performed by: EMERGENCY MEDICINE

## 2024-08-08 PROCEDURE — 250N000009 HC RX 250: Performed by: EMERGENCY MEDICINE

## 2024-08-08 PROCEDURE — 96365 THER/PROPH/DIAG IV INF INIT: CPT

## 2024-08-08 PROCEDURE — 99285 EMERGENCY DEPT VISIT HI MDM: CPT | Mod: 25

## 2024-08-08 PROCEDURE — 87637 SARSCOV2&INF A&B&RSV AMP PRB: CPT | Performed by: EMERGENCY MEDICINE

## 2024-08-08 PROCEDURE — 36415 COLL VENOUS BLD VENIPUNCTURE: CPT | Performed by: EMERGENCY MEDICINE

## 2024-08-08 PROCEDURE — 999N000157 HC STATISTIC RCP TIME EA 10 MIN

## 2024-08-08 PROCEDURE — 85025 COMPLETE CBC W/AUTO DIFF WBC: CPT | Performed by: EMERGENCY MEDICINE

## 2024-08-08 PROCEDURE — 94640 AIRWAY INHALATION TREATMENT: CPT

## 2024-08-08 PROCEDURE — 84145 PROCALCITONIN (PCT): CPT | Performed by: EMERGENCY MEDICINE

## 2024-08-08 RX ORDER — IPRATROPIUM BROMIDE AND ALBUTEROL SULFATE 2.5; .5 MG/3ML; MG/3ML
3 SOLUTION RESPIRATORY (INHALATION)
Status: DISCONTINUED | OUTPATIENT
Start: 2024-08-08 | End: 2024-08-09

## 2024-08-08 RX ORDER — DEXAMETHASONE SODIUM PHOSPHATE 10 MG/ML
10 INJECTION, SOLUTION INTRAMUSCULAR; INTRAVENOUS ONCE
Status: DISCONTINUED | OUTPATIENT
Start: 2024-08-08 | End: 2024-08-08

## 2024-08-08 RX ORDER — MAGNESIUM SULFATE HEPTAHYDRATE 40 MG/ML
2 INJECTION, SOLUTION INTRAVENOUS ONCE
Status: DISCONTINUED | OUTPATIENT
Start: 2024-08-08 | End: 2024-08-08

## 2024-08-08 RX ADMIN — IPRATROPIUM BROMIDE AND ALBUTEROL SULFATE 3 ML: .5; 3 SOLUTION RESPIRATORY (INHALATION) at 22:49

## 2024-08-08 RX ADMIN — MAGNESIUM SULFATE HEPTAHYDRATE 2 G: 40 INJECTION, SOLUTION INTRAVENOUS at 22:49

## 2024-08-08 RX ADMIN — DEXAMETHASONE SODIUM PHOSPHATE 10 MG: 10 INJECTION, SOLUTION INTRAMUSCULAR; INTRAVENOUS at 22:46

## 2024-08-08 ASSESSMENT — COLUMBIA-SUICIDE SEVERITY RATING SCALE - C-SSRS
6. HAVE YOU EVER DONE ANYTHING, STARTED TO DO ANYTHING, OR PREPARED TO DO ANYTHING TO END YOUR LIFE?: NO
1. IN THE PAST MONTH, HAVE YOU WISHED YOU WERE DEAD OR WISHED YOU COULD GO TO SLEEP AND NOT WAKE UP?: NO
2. HAVE YOU ACTUALLY HAD ANY THOUGHTS OF KILLING YOURSELF IN THE PAST MONTH?: NO

## 2024-08-08 ASSESSMENT — ACTIVITIES OF DAILY LIVING (ADL): ADLS_ACUITY_SCORE: 39

## 2024-08-09 PROBLEM — U07.1 COVID-19 VIRUS INFECTION: Status: ACTIVE | Noted: 2024-08-09

## 2024-08-09 LAB
ALLEN'S TEST: YES
ATRIAL RATE - MUSE: 81 BPM
BASE EXCESS BLDA CALC-SCNC: 0.4 MMOL/L (ref -3–3)
COHGB MFR BLD: 98.8 % (ref 96–97)
DIASTOLIC BLOOD PRESSURE - MUSE: NORMAL MMHG
GLUCOSE BLDC GLUCOMTR-MCNC: 142 MG/DL (ref 70–99)
GLUCOSE BLDC GLUCOMTR-MCNC: 143 MG/DL (ref 70–99)
GLUCOSE BLDC GLUCOMTR-MCNC: 151 MG/DL (ref 70–99)
GLUCOSE BLDC GLUCOMTR-MCNC: 170 MG/DL (ref 70–99)
HCO3 BLD-SCNC: 26 MMOL/L (ref 21–28)
INTERPRETATION ECG - MUSE: NORMAL
O2/TOTAL GAS SETTING VFR VENT: 25 %
P AXIS - MUSE: 85 DEGREES
PCO2 BLD: 47 MM HG (ref 35–45)
PH BLD: 7.36 [PH] (ref 7.35–7.45)
PO2 BLD: 110 MM HG (ref 80–105)
PR INTERVAL - MUSE: 156 MS
QRS DURATION - MUSE: 74 MS
QT - MUSE: 374 MS
QTC - MUSE: 434 MS
R AXIS - MUSE: 62 DEGREES
SAO2 % BLDA: 97 % (ref 92–100)
SYSTOLIC BLOOD PRESSURE - MUSE: NORMAL MMHG
T AXIS - MUSE: 1 DEGREES
VENTRICULAR RATE- MUSE: 81 BPM

## 2024-08-09 PROCEDURE — G0463 HOSPITAL OUTPT CLINIC VISIT: HCPCS

## 2024-08-09 PROCEDURE — 999N000157 HC STATISTIC RCP TIME EA 10 MIN

## 2024-08-09 PROCEDURE — 94660 CPAP INITIATION&MGMT: CPT

## 2024-08-09 PROCEDURE — XW033E5 INTRODUCTION OF REMDESIVIR ANTI-INFECTIVE INTO PERIPHERAL VEIN, PERCUTANEOUS APPROACH, NEW TECHNOLOGY GROUP 5: ICD-10-PCS | Performed by: INTERNAL MEDICINE

## 2024-08-09 PROCEDURE — 250N000013 HC RX MED GY IP 250 OP 250 PS 637: Performed by: INTERNAL MEDICINE

## 2024-08-09 PROCEDURE — 99223 1ST HOSP IP/OBS HIGH 75: CPT | Performed by: INTERNAL MEDICINE

## 2024-08-09 PROCEDURE — 200N000001 HC R&B ICU

## 2024-08-09 PROCEDURE — 94640 AIRWAY INHALATION TREATMENT: CPT | Mod: 76

## 2024-08-09 PROCEDURE — 250N000009 HC RX 250: Performed by: INTERNAL MEDICINE

## 2024-08-09 PROCEDURE — 36600 WITHDRAWAL OF ARTERIAL BLOOD: CPT

## 2024-08-09 PROCEDURE — 250N000011 HC RX IP 250 OP 636: Performed by: INTERNAL MEDICINE

## 2024-08-09 PROCEDURE — 250N000011 HC RX IP 250 OP 636: Performed by: STUDENT IN AN ORGANIZED HEALTH CARE EDUCATION/TRAINING PROGRAM

## 2024-08-09 PROCEDURE — 258N000003 HC RX IP 258 OP 636: Performed by: INTERNAL MEDICINE

## 2024-08-09 PROCEDURE — 99207 PR APP CREDIT; MD BILLING SHARED VISIT: CPT | Performed by: STUDENT IN AN ORGANIZED HEALTH CARE EDUCATION/TRAINING PROGRAM

## 2024-08-09 PROCEDURE — 82805 BLOOD GASES W/O2 SATURATION: CPT | Performed by: INTERNAL MEDICINE

## 2024-08-09 RX ORDER — AMOXICILLIN 250 MG
1 CAPSULE ORAL 2 TIMES DAILY PRN
Status: DISCONTINUED | OUTPATIENT
Start: 2024-08-09 | End: 2024-08-15

## 2024-08-09 RX ORDER — LORAZEPAM 0.5 MG/1
0.5 TABLET ORAL EVERY 4 HOURS PRN
Status: DISCONTINUED | OUTPATIENT
Start: 2024-08-09 | End: 2024-08-19 | Stop reason: HOSPADM

## 2024-08-09 RX ORDER — ACETAMINOPHEN 325 MG/1
650 TABLET ORAL EVERY 4 HOURS PRN
Status: DISCONTINUED | OUTPATIENT
Start: 2024-08-09 | End: 2024-08-15

## 2024-08-09 RX ORDER — IPRATROPIUM BROMIDE AND ALBUTEROL SULFATE 2.5; .5 MG/3ML; MG/3ML
1 SOLUTION RESPIRATORY (INHALATION) 2 TIMES DAILY
COMMUNITY

## 2024-08-09 RX ORDER — AZITHROMYCIN 250 MG/1
250 TABLET, FILM COATED ORAL
Status: DISCONTINUED | OUTPATIENT
Start: 2024-08-09 | End: 2024-08-09

## 2024-08-09 RX ORDER — CYCLOBENZAPRINE HCL 10 MG
10 TABLET ORAL 3 TIMES DAILY PRN
COMMUNITY

## 2024-08-09 RX ORDER — DEXTROSE MONOHYDRATE 25 G/50ML
25-50 INJECTION, SOLUTION INTRAVENOUS
Status: DISCONTINUED | OUTPATIENT
Start: 2024-08-09 | End: 2024-08-19 | Stop reason: HOSPADM

## 2024-08-09 RX ORDER — AMIODARONE HYDROCHLORIDE 200 MG/1
200 TABLET ORAL DAILY
Status: DISCONTINUED | OUTPATIENT
Start: 2024-08-09 | End: 2024-08-09

## 2024-08-09 RX ORDER — METOPROLOL TARTRATE 37.5 MG/1
37.5 TABLET, FILM COATED ORAL 2 TIMES DAILY
Status: DISCONTINUED | OUTPATIENT
Start: 2024-08-09 | End: 2024-08-09

## 2024-08-09 RX ORDER — PAROXETINE 20 MG/1
20 TABLET, FILM COATED ORAL DAILY
Status: DISCONTINUED | OUTPATIENT
Start: 2024-08-09 | End: 2024-08-19 | Stop reason: HOSPADM

## 2024-08-09 RX ORDER — ACETAMINOPHEN 325 MG/10.15ML
650 LIQUID ORAL EVERY 4 HOURS PRN
Status: DISCONTINUED | OUTPATIENT
Start: 2024-08-09 | End: 2024-08-15

## 2024-08-09 RX ORDER — HYDRALAZINE HYDROCHLORIDE 20 MG/ML
10 INJECTION INTRAMUSCULAR; INTRAVENOUS EVERY 6 HOURS PRN
Status: DISCONTINUED | OUTPATIENT
Start: 2024-08-09 | End: 2024-08-12

## 2024-08-09 RX ORDER — PROCHLORPERAZINE 25 MG
25 SUPPOSITORY, RECTAL RECTAL EVERY 12 HOURS PRN
Status: DISCONTINUED | OUTPATIENT
Start: 2024-08-09 | End: 2024-08-19 | Stop reason: HOSPADM

## 2024-08-09 RX ORDER — AZITHROMYCIN 500 MG/1
500 TABLET, FILM COATED ORAL
COMMUNITY

## 2024-08-09 RX ORDER — ONDANSETRON 2 MG/ML
4 INJECTION INTRAMUSCULAR; INTRAVENOUS EVERY 6 HOURS PRN
Status: DISCONTINUED | OUTPATIENT
Start: 2024-08-09 | End: 2024-08-19 | Stop reason: HOSPADM

## 2024-08-09 RX ORDER — DILTIAZEM HYDROCHLORIDE 240 MG/1
240 CAPSULE, EXTENDED RELEASE ORAL DAILY
COMMUNITY

## 2024-08-09 RX ORDER — AZITHROMYCIN 250 MG/1
500 TABLET, FILM COATED ORAL
Status: DISCONTINUED | OUTPATIENT
Start: 2024-08-09 | End: 2024-08-19 | Stop reason: HOSPADM

## 2024-08-09 RX ORDER — POLYETHYLENE GLYCOL 3350 17 G/17G
17 POWDER, FOR SOLUTION ORAL DAILY PRN
Status: DISCONTINUED | OUTPATIENT
Start: 2024-08-09 | End: 2024-08-15

## 2024-08-09 RX ORDER — SODIUM CHLORIDE FOR INHALATION 3 %
4 VIAL, NEBULIZER (ML) INHALATION 2 TIMES DAILY
Status: DISCONTINUED | OUTPATIENT
Start: 2024-08-09 | End: 2024-08-19 | Stop reason: HOSPADM

## 2024-08-09 RX ORDER — ATORVASTATIN CALCIUM 20 MG/1
20 TABLET, FILM COATED ORAL DAILY
Status: DISCONTINUED | OUTPATIENT
Start: 2024-08-09 | End: 2024-08-19 | Stop reason: HOSPADM

## 2024-08-09 RX ORDER — ALBUTEROL SULFATE 0.83 MG/ML
2.5 SOLUTION RESPIRATORY (INHALATION) EVERY 4 HOURS PRN
Status: DISCONTINUED | OUTPATIENT
Start: 2024-08-09 | End: 2024-08-19 | Stop reason: HOSPADM

## 2024-08-09 RX ORDER — AMOXICILLIN 250 MG
2 CAPSULE ORAL 2 TIMES DAILY PRN
Status: DISCONTINUED | OUTPATIENT
Start: 2024-08-09 | End: 2024-08-15

## 2024-08-09 RX ORDER — PREDNISONE 10 MG/1
10 TABLET ORAL DAILY
Status: ON HOLD | COMMUNITY
End: 2024-08-19

## 2024-08-09 RX ORDER — IPRATROPIUM BROMIDE AND ALBUTEROL SULFATE 2.5; .5 MG/3ML; MG/3ML
3 SOLUTION RESPIRATORY (INHALATION)
Status: DISCONTINUED | OUTPATIENT
Start: 2024-08-09 | End: 2024-08-12

## 2024-08-09 RX ORDER — ONDANSETRON 4 MG/1
4 TABLET, ORALLY DISINTEGRATING ORAL EVERY 6 HOURS PRN
Status: DISCONTINUED | OUTPATIENT
Start: 2024-08-09 | End: 2024-08-19 | Stop reason: HOSPADM

## 2024-08-09 RX ORDER — NICOTINE POLACRILEX 4 MG
15-30 LOZENGE BUCCAL
Status: DISCONTINUED | OUTPATIENT
Start: 2024-08-09 | End: 2024-08-19 | Stop reason: HOSPADM

## 2024-08-09 RX ORDER — DEXAMETHASONE SODIUM PHOSPHATE 10 MG/ML
6 INJECTION, SOLUTION INTRAMUSCULAR; INTRAVENOUS DAILY
Status: DISCONTINUED | OUTPATIENT
Start: 2024-08-09 | End: 2024-08-12

## 2024-08-09 RX ORDER — CARBOXYMETHYLCELLULOSE SODIUM 5 MG/ML
1 SOLUTION/ DROPS OPHTHALMIC
Status: DISCONTINUED | OUTPATIENT
Start: 2024-08-09 | End: 2024-08-15

## 2024-08-09 RX ORDER — CLONAZEPAM 0.5 MG/1
0.5 TABLET ORAL DAILY
Status: DISCONTINUED | OUTPATIENT
Start: 2024-08-09 | End: 2024-08-10

## 2024-08-09 RX ORDER — PROCHLORPERAZINE MALEATE 5 MG
10 TABLET ORAL EVERY 6 HOURS PRN
Status: DISCONTINUED | OUTPATIENT
Start: 2024-08-09 | End: 2024-08-19 | Stop reason: HOSPADM

## 2024-08-09 RX ORDER — MORPHINE SULFATE 2 MG/ML
1 INJECTION, SOLUTION INTRAMUSCULAR; INTRAVENOUS ONCE
Status: COMPLETED | OUTPATIENT
Start: 2024-08-09 | End: 2024-08-09

## 2024-08-09 RX ORDER — ALBUTEROL SULFATE 90 UG/1
2 AEROSOL, METERED RESPIRATORY (INHALATION) EVERY 4 HOURS PRN
Status: DISCONTINUED | OUTPATIENT
Start: 2024-08-09 | End: 2024-08-19 | Stop reason: HOSPADM

## 2024-08-09 RX ORDER — LEVOTHYROXINE SODIUM 112 UG/1
112 TABLET ORAL DAILY
Status: DISCONTINUED | OUTPATIENT
Start: 2024-08-09 | End: 2024-08-19 | Stop reason: HOSPADM

## 2024-08-09 RX ORDER — CLONAZEPAM 0.5 MG/1
0.5 TABLET ORAL 2 TIMES DAILY PRN
COMMUNITY

## 2024-08-09 RX ADMIN — AZITHROMYCIN DIHYDRATE 500 MG: 250 TABLET ORAL at 16:32

## 2024-08-09 RX ADMIN — SODIUM CHLORIDE 50 ML: 9 INJECTION, SOLUTION INTRAVENOUS at 04:18

## 2024-08-09 RX ADMIN — PAROXETINE HYDROCHLORIDE 20 MG: 20 TABLET, FILM COATED ORAL at 10:14

## 2024-08-09 RX ADMIN — HYDRALAZINE HYDROCHLORIDE 10 MG: 20 INJECTION INTRAMUSCULAR; INTRAVENOUS at 11:17

## 2024-08-09 RX ADMIN — REMDESIVIR 200 MG: 100 INJECTION, POWDER, LYOPHILIZED, FOR SOLUTION INTRAVENOUS at 03:42

## 2024-08-09 RX ADMIN — APIXABAN 5 MG: 5 TABLET, FILM COATED ORAL at 10:14

## 2024-08-09 RX ADMIN — ACETAMINOPHEN 650 MG: 325 TABLET, FILM COATED ORAL at 10:51

## 2024-08-09 RX ADMIN — CLONAZEPAM 0.5 MG: 0.5 TABLET ORAL at 10:51

## 2024-08-09 RX ADMIN — ATORVASTATIN CALCIUM 20 MG: 20 TABLET, FILM COATED ORAL at 10:14

## 2024-08-09 RX ADMIN — IPRATROPIUM BROMIDE AND ALBUTEROL SULFATE 3 ML: .5; 3 SOLUTION RESPIRATORY (INHALATION) at 15:47

## 2024-08-09 RX ADMIN — METOPROLOL TARTRATE 37.5 MG: 25 TABLET, FILM COATED ORAL at 10:14

## 2024-08-09 RX ADMIN — LEVOTHYROXINE SODIUM 112 MCG: 0.11 TABLET ORAL at 10:14

## 2024-08-09 RX ADMIN — LORAZEPAM 0.5 MG: 0.5 TABLET ORAL at 21:52

## 2024-08-09 RX ADMIN — IPRATROPIUM BROMIDE AND ALBUTEROL SULFATE 3 ML: .5; 3 SOLUTION RESPIRATORY (INHALATION) at 20:04

## 2024-08-09 RX ADMIN — SODIUM CHLORIDE SOLN NEBU 3% 4 ML: 3 NEBU SOLN at 20:04

## 2024-08-09 RX ADMIN — METOPROLOL TARTRATE 37.5 MG: 25 TABLET, FILM COATED ORAL at 21:52

## 2024-08-09 RX ADMIN — APIXABAN 5 MG: 5 TABLET, FILM COATED ORAL at 21:52

## 2024-08-09 RX ADMIN — IPRATROPIUM BROMIDE AND ALBUTEROL SULFATE 3 ML: .5; 3 SOLUTION RESPIRATORY (INHALATION) at 07:51

## 2024-08-09 RX ADMIN — ALBUTEROL SULFATE 2.5 MG: 2.5 SOLUTION RESPIRATORY (INHALATION) at 23:38

## 2024-08-09 RX ADMIN — MORPHINE SULFATE 1 MG: 2 INJECTION, SOLUTION INTRAMUSCULAR; INTRAVENOUS at 10:51

## 2024-08-09 RX ADMIN — HYDRALAZINE HYDROCHLORIDE 10 MG: 20 INJECTION INTRAMUSCULAR; INTRAVENOUS at 23:23

## 2024-08-09 RX ADMIN — IPRATROPIUM BROMIDE AND ALBUTEROL SULFATE 3 ML: .5; 3 SOLUTION RESPIRATORY (INHALATION) at 11:36

## 2024-08-09 RX ADMIN — ALBUTEROL SULFATE 2.5 MG: 2.5 SOLUTION RESPIRATORY (INHALATION) at 11:48

## 2024-08-09 RX ADMIN — DEXAMETHASONE SODIUM PHOSPHATE 6 MG: 10 INJECTION, SOLUTION INTRAMUSCULAR; INTRAVENOUS at 12:09

## 2024-08-09 ASSESSMENT — ACTIVITIES OF DAILY LIVING (ADL)
ADLS_ACUITY_SCORE: 29
ADLS_ACUITY_SCORE: 33
ADLS_ACUITY_SCORE: 25
ADLS_ACUITY_SCORE: 29
ADLS_ACUITY_SCORE: 33
ADLS_ACUITY_SCORE: 39
ADLS_ACUITY_SCORE: 33
ADLS_ACUITY_SCORE: 25
ADLS_ACUITY_SCORE: 33
ADLS_ACUITY_SCORE: 39
ADLS_ACUITY_SCORE: 33
ADLS_ACUITY_SCORE: 33
ADLS_ACUITY_SCORE: 39
ADLS_ACUITY_SCORE: 33

## 2024-08-09 NOTE — PLAN OF CARE
"  Problem: Adult Inpatient Plan of Care  Goal: Plan of Care Review  Description: The Plan of Care Review/Shift note should be completed every shift.  The Outcome Evaluation is a brief statement about your assessment that the patient is improving, declining, or no change.  This information will be displayed automatically on your shift  note.  Outcome: Progressing  Flowsheets (Taken 8/9/2024 0614)  Outcome Evaluation: resting well on the bipap. Blood pressure better, home medications ordered. Voiding per bedside commode.  Plan of Care Reviewed With: patient  Overall Patient Progress: improving  Goal: Patient-Specific Goal (Individualized)  Description: You can add care plan individualizations to a care plan. Examples of Individualization might be:  \"Parent requests to be called daily at 9am for status\", \"I have a hard time hearing out of my right ear\", or \"Do not touch me to wake me up as it startles  me\".  Outcome: Progressing  Flowsheets (Taken 8/9/2024 0400)  Anxieties, Fears or Concerns: none  Goal: Absence of Hospital-Acquired Illness or Injury  Outcome: Progressing  Intervention: Identify and Manage Fall Risk  Recent Flowsheet Documentation  Taken 8/9/2024 0343 by Danielle Alexis RN  Safety Promotion/Fall Prevention:   activity supervised   clutter free environment maintained   lighting adjusted   nonskid shoes/slippers when out of bed   safety round/check completed  Intervention: Prevent Skin Injury  Recent Flowsheet Documentation  Taken 8/9/2024 0343 by Danielle Alexis, RN  Body Position: position changed independently  Skin Protection: adhesive use limited  Device Skin Pressure Protection:   adhesive use limited   tubing/devices free from skin contact  Goal: Optimal Comfort and Wellbeing  Outcome: Progressing  Goal: Readiness for Transition of Care  Outcome: Progressing  Intervention: Mutually Develop Transition Plan  Recent Flowsheet Documentation  Taken 8/9/2024 0400 by Danielle Alexis, " RN  Patient/Family Anticipates Transition to: home with family  Equipment Currently Used at Home: none     Problem: Skin Injury Risk Increased  Goal: Skin Health and Integrity  Outcome: Progressing  Intervention: Plan: Nurse Driven Intervention: Moisture Management  Recent Flowsheet Documentation  Taken 8/9/2024 0343 by Danielle Alexis RN  Bathing/Skin Care:   wipes, CHG   dressed/undressed   electrode patches/site rotation   linen changed  Intervention: Optimize Skin Protection  Recent Flowsheet Documentation  Taken 8/9/2024 0343 by Danielle Alexis RN  Skin Protection: adhesive use limited  Activity Management: activity adjusted per tolerance     Problem: Infection  Goal: Absence of Infection Signs and Symptoms  Outcome: Progressing  Intervention: Prevent or Manage Infection  Recent Flowsheet Documentation  Taken 8/9/2024 0343 by Danielle Alexis RN  Isolation Precautions: special precautions maintained     Problem: COPD (Chronic Obstructive Pulmonary Disease)  Goal: Optimal Chronic Illness Coping  Outcome: Progressing  Goal: Optimal Level of Functional [place]  Outcome: Progressing  Intervention: Optimize Functional Ability  Recent Flowsheet Documentation  Taken 8/9/2024 0343 by Danielle Alexis RN  Activity Management: activity adjusted per tolerance  Goal: Absence of Infection Signs and Symptoms  Outcome: Progressing  Intervention: Prevent or Manage Infection  Recent Flowsheet Documentation  Taken 8/9/2024 0343 by Danielle Alexis RN  Isolation Precautions: special precautions maintained  Goal: Improved Oral Intake  Outcome: Progressing  Goal: Effective Oxygenation and Ventilation  Outcome: Progressing  Intervention: Promote Airway Secretion Clearance  Recent Flowsheet Documentation  Taken 8/9/2024 0343 by Danielle Alexis RN  Activity Management: activity adjusted per tolerance     Problem: Comorbidity Management  Goal: Maintenance of COPD Symptom Control  Outcome: Progressing  Intervention:  Maintain COPD Symptom Control  Recent Flowsheet Documentation  Taken 8/9/2024 0343 by Danielle Alexis, RN  Medication Review/Management: medications reviewed  Goal: Blood Pressure in Desired Range  Outcome: Progressing  Intervention: Maintain Blood Pressure Management  Recent Flowsheet Documentation  Taken 8/9/2024 0343 by Danielle Alexis RN  Medication Review/Management: medications reviewed   Goal Outcome Evaluation:      Plan of Care Reviewed With: patient    Overall Patient Progress: improvingOverall Patient Progress: improving    Outcome Evaluation: resting well on the bipap. Blood pressure better, home medications ordered. Voiding per bedside commode.

## 2024-08-09 NOTE — H&P
[facility]  Hospitalist Admission Note  Name: Lara Melendez    MRN: [id number]  YOB: 1965    Age: 58 year old  Date of admission: 8/8/2024  Primary care provider: Sudhir Carorll    Chief Complaint:  shortness of breath    Assessment and Plan:   COVID-19 infection  Acute on chronic hypoxemic and hypercapnic respiratory failure  Severe COPD with acute exacerbation: She has severe COPD at baseline and follows closely with pulmonology.  She is on numerous inhalers at baseline.  She has been receiving prednisone to try to optimize her lung function prior to having an ostomy takedown and she was taking 10 mg daily although took 30 mg this morning.  For the last 24 to 48 hours she has had headache, fatigue, and progressively worsening shortness of breath with wheezing despite using nebulizer treatments.  In the ER she was in respiratory distress and received further nebs, magnesium, and 10 mg IV dexamethasone after testing positive for COVID-19 PCR.  Chest x-ray does not show any infiltrate.  She was placed on BiPAP for her COPD exacerbation and is feeling better.  She does not have a leukocytosis, fever, or elevated procalcitonin/lactic acid.  She has had COVID-19 before and is vaccinated.  She did require intubation in January for COPD and also required a tracheostomy a little over a year ago that has since been decannulated.  She does use 3 L oxygen at home and chronically has a mildly elevated CO2 on previous blood gases as she does now with pH 7.33, pCO2 51, pO2 66, bicarb 27.  -IV dexamethasone 6 mg every 24 hours, if wheezing/COPD exacerbation not improving consider changing to high-dose IV Solu-Medrol  -IV Remdesevir  -DuoNebs 4 times daily, PTA 3% saline nebs twice daily, albuterol nebs as needed  -Continue BiPAP until respiratory status improves  -Continuous pulse ox and supplemental oxygen as needed, uses 2 to 3 L at baseline  -Acetaminophen as needed for headache and  fevers  -Continue PTA azithromycin 250 mg every MWF    Paroxysmal A-fib  HTN:   EKG shows NSR.  PTA on Eliquis 5 mg twice daily, metoprolol tartrate 37.5 mg twice daily, and amiodarone 200 mg daily.  -Resume PTA Eliquis, metoprolol, and amiodarone    HLD: Resume PTA atorvastatin.    Hypothyroidism: Resume PTA levothyroxine.    MDD  Anxiety: Resume PTA paroxetine and clonazepam 0.5 mg daily as needed.    Chronic anemia: Hemoglobin at baseline of 10-11.    Colostomy: Previous large bowel obstruction requiring diverting colostomy.  As above there were plans to have a takedown surgery once lung status was optimized, however this was canceled.  -WOC RN consult for colostomy    DVT Prophylaxis: Eliquis  Code Status: Full Code  FEN: Regular diet  Discharge Dispo: Home  Estimated Disch Date / # of Days until Disch: Admit inpatient to the ICU due to BiPAP needs and severe COPD at baseline.  Anticipate minimal 3 or 4 night hospitalization until steroids take effect.      History of Present Illness:  Lara Melendez is a 58 year old female with PMH including severe COPD on 2-3 L O2 requiring previous tracheostomy since did not cannulated and intubation earlier this year, paroxysmal A-fib on Eliquis, HTN, HLD, hypothyroidism, anemia, MDD, anxiety, and large bowel obstruction requiring ostomy placement who presents with severe shortness of breath.  For the last 1 to 2 days she has felt fatigued and had a headache.  Throughout the day today she became severely short of breath and wheezy.  She tried nebulizer treatments without improvement.  She has been taking prednisone 10 mg daily to try to optimize her lung status to get her ostomy takedown surgery, but today she took 30 mg due to her wheezing.  She denies any cough, myalgias, nausea, vomiting, diarrhea, chest pain.  She has had COVID-19 before and is vaccinated.  She has required intubation for COPD exacerbations the past including in January and she required a tracheostomy a  little over 1 year ago.    History obtained from patient, medical record, and from Dr. Alexandre in the emergency department.  She was placed on BiPAP due to respiratory distress in the ER.  Blood pressure 137/95, heart rate 82, temperature 98  F, oxygen currently 98% on BiPAP.  WBC count 6.3, hemoglobin 11.5, platelet count 249.  BMP within normal limits.  VBG shows pH 7.33, pCO2 51, pO2 66, bicarb 27.  Procalcitonin normal at 0.05.  Lactic acid not elevated 1.5.  NT proBNP normal at 565.  Group A strep is negative.  COVID-19 PCR is positive.  EKG shows NSR.  Chest x-ray does not show any acute infiltrate.  She was given 10 mg IV dexamethasone, DuoNeb, and 2 g IV magnesium.  Shortness of breath feels improved.  Admit inpatient to the ICU.       Clinically Significant Risk Factors Present on Admission               # Drug Induced Coagulation Defect: home medication list includes an anticoagulant medication    # Hypertension: Noted on problem list             # COPD: noted on problem list                        Past Medical History reviewed:  Past Medical History:   Diagnosis Date    Anxiety     COPD (chronic obstructive pulmonary disease)     Diverticulitis of colon     Hypercholesterolemia     Hypertension     Hypothyroidism     Infection due to 2019 novel coronavirus 05/14/2022    Paroxysmal atrial fibrillation     Psoriasis     Pulmonary nodule - left upper lobe      Past Surgical History reviewed:  Past Surgical History:   Procedure Laterality Date    CHOLECYSTECTOMY      COLOSTOMY N/A 7/12/2023    Procedure: OPENING DIVERTING COLOSTOMY;  Surgeon: Jaspal Rashid MD;  Location: [facility] OR    INCISION AND DRAINAGE MANDIBLE, COMBINED Left 01/10/2022    Procedure: INCISION AND DRAINAGE, MANDIBLE;  Surgeon: Jn Feliz DDS;  Location: [facility] OR    INCISION AND DRAINAGE MANDIBLE, COMBINED Left 02/04/2022    Procedure: INCISION AND DRAINAGE, MANDIBLE;  Surgeon: Taylor Ortez DDS;  Location: [facility] OR    TOOTH EXTRACTION       Vocal Cord surgery       Social History reviewed:  Social History     Tobacco Use    Smoking status: Never    Smokeless tobacco: Never   Substance Use Topics    Alcohol use: Yes     Comment: occ     Social History     Social History Narrative    Not on file     Family History reviewed:  Family History   Problem Relation Age of Onset    Deep Vein Thrombosis (DVT) Mother     Hypertension Mother      Allergies:  Allergies   Allergen Reactions    Sulfa Antibiotics     Doxycycline Other (See Comments)     Develops chest tightness  Intolerance not allergy    Penicillins Rash     Generalized rash  Rash, Generalized       Medications:  Prior to Admission medications    Medication Sig Last Dose Taking? Auth Provider Long Term End Date   albuterol (PROAIR HFA/PROVENTIL HFA/VENTOLIN HFA) 108 (90 Base) MCG/ACT inhaler Inhale 2 puffs into the lungs every 4 hours as needed for shortness of breath / dyspnea or wheezing Inhale 1-2 Puffs every 4 hours as needed for Wheezing.   Mark Olsen,  Yes    albuterol (PROVENTIL) (2.5 MG/3ML) 0.083% neb solution Take 1 vial (2.5 mg) by nebulization every 4 hours as needed for shortness of breath or wheezing   Aftab Malave MD Yes    amiodarone (PACERONE) 200 MG tablet Take 1 tablet (200 mg) by mouth daily   Galdino Devine MD Yes    apixaban ANTICOAGULANT (ELIQUIS) 5 MG tablet Take 1 tablet (5 mg) by mouth 2 times daily   Luis Alberto Valentin MD     atorvastatin (LIPITOR) 20 MG tablet Take 20 mg by mouth daily   Reported, Patient Yes    clonazePAM (KLONOPIN) 0.5 MG tablet Take 0.5 mg by mouth daily   Unknown, Entered By History Yes    fluticasone-vilanterol (BREO ELLIPTA) 200-25 MCG/ACT inhaler Inhale 1 Dose into the lungs daily   Unknown, Entered By History     ipratropium - albuterol 0.5 mg/2.5 mg/3 mL (DUONEB) 0.5-2.5 (3) MG/3ML neb solution Take 1 vial (3 mLs) by nebulization every 6 hours as needed for shortness of breath / dyspnea or wheezing   Morales Alva  MD Amelie Yes    levalbuterol (XOPENEX) 0.63 MG/3ML neb solution Inhale 0.63 mg into the lungs every 4 hours as needed   Unknown, Entered By History     levothyroxine (SYNTHROID/LEVOTHROID) 112 MCG tablet Take 112 mcg by mouth daily   Reported, Patient Yes    metoprolol tartrate 37.5 MG TABS Take 37.5 mg by mouth 2 times daily   Galdino Devine MD Yes    PARoxetine (PAXIL) 20 MG tablet Take 20 mg by mouth daily   Unknown, Entered By History No    sodium chloride (NEBUSAL) 3 % neb solution Inhale 4 mLs into the lungs 2 times daily   Unknown, Entered By History     tiotropium (SPIRIVA) 18 MCG inhaled capsule Inhale 1 capsule (18 mcg) into the lungs daily   Galdino Devine MD Yes      Review of Systems:  A Comprehensive greater than 10 system review of systems was carried out.  Pertinent positives and negatives are noted above.  Otherwise negative.     Physical Exam:  Blood pressure (!) 149/93, pulse 81, temperature 98  F (36.7  C), temperature source Oral, resp. rate 23, SpO2 100%.  Wt Readings from Last 1 Encounters:   02/11/24 72 kg (158 lb 11.7 oz)     Exam:  Constitutional: Awake, appears to be in mild respiratory distress on BiPAP  Eyes: sclera white  HEENT: Dry mucous membranes  Respiratory: On BiPAP, bilateral expiratory wheeze, no focal crackles  Cardiovascular: RRR.  No murmur  GI: non-tender, not distended, bowel sounds present  Skin: no rash or lesions, acyanotic  Musculoskeletal/extremities: No edema  Neurologic: A&O, speech clear  Psychiatric: calm, cooperative    Lab and imaging data personally reviewed:  Labs:  Recent Labs   Lab 08/08/24 2243   PHV 7.33   PO2V 66*   PCO2V 51*   HCO3V 27     Recent Labs   Lab 08/08/24 2243   WBC 6.3   HGB 11.5*   HCT 36.0   MCV 92        Recent Labs   Lab 08/08/24 2243      POTASSIUM 4.1   CHLORIDE 101   CO2 23   ANIONGAP 14   *   BUN 6.7*   CR 0.85   GFRESTIMATED 78   EDIN 9.0     Recent Labs   Lab 08/08/24 2243   NTBNPI 565     Recent Labs    Lab 08/08/24  2243   LACT 1.5     Procalcitonin 0.05  COVID-19 PCR positive  Influenza A/B, RSV PCR negative  Group A strep PCR negative    EKG: NSR    Imaging:  Recent Results (from the past 24 hour(s))   XR Chest Port 1 View    Narrative    EXAM: XR CHEST PORT 1 VIEW  LOCATION: [facility]  DATE: 8/8/2024    INDICATION: SOB  COMPARISON: 2/5/2024.      Impression    IMPRESSION: Normal heart size. No evidence for CHF or pneumonia. No pleural effusion or pneumothorax. Cluster of tiny calcifications in the left upper lobe likely related to prior granulomas process and similar to chest CT 6/26/2023.       Albin Navarro MD  Hospitalist  [facility]

## 2024-08-09 NOTE — PROGRESS NOTES
Pt trailed off BIPAP.     Pt placed back on BIPAP on 18/6 on 25% FIO2 for SOB, increased WOB and SOB.    Accessory muscle use and abdominal muscle use is noted.    Pt has inspiratory and expiratory wheezes.  BS are tight with minimal air movement Duoneb and Albuterol given back to back with mild improvement in aeration and WOB.    Will continue to follow and assess and make changes as needed. RN aware and MD paged.    Will make changes per pt and per VBG or ABG.    RT Alexandra on 8/9/2024 at 11:56 AM

## 2024-08-09 NOTE — PROGRESS NOTES
"A BiPAP of  14/7 @ 30% was applied to the pt via the mask for an increase in WOB and/or SOB.  The bridge of the nose looks good and remains intact. Pt is tolerating it well. Will continue to monitor and assess the pt's current respiratory status and needs.    Vital signs:  Temp: 98  F (36.7  C) Temp src: Oral BP: (!) 192/124 Pulse: 79   Resp: 23 SpO2: 96 % O2 Device: BiPAP/CPAP Oxygen Delivery: 3 LPM      Estimated body mass index is 28.12 kg/m  as calculated from the following:    Height as of 2/7/24: 1.6 m (5' 3\").    Weight as of 2/11/24: 72 kg (158 lb 11.7 oz).      Kat Rodriguez, RT on 8/8/2024 at 11:05 PM      "

## 2024-08-09 NOTE — PROGRESS NOTES
"OUTPATIENT OSTOMY INSTRUCTIONS                                                                        Type of Stoma: Colostomy               Equipment:    Product # Brand Description   Pouch [id number] Coloplast 1 pc opaque flat maxi drainable with high output can cut up to 4\"         Ring [id number] Coloplast Brava protective seal extra wide 3\" diameter   Brava Strips 50196 Coloplast Elastic Barrier Strip XL                                      Procedure:  Close the bottom of the pouch.  If needed cut the opening of your pouch to the correct size (follow pattern or 1/8 inch larger than stoma) and then remove backings from adhesive surfaces.  Remove the soiled pouch and discard.  Wash skin with water and dry.    Then: Apply Ring: Around stoma.               Then: Apply pouching system to stoma site and hold in place for 2-5 minutes.    Then: Apply Brava Barrier Strips.    ______________________________________________________________________________    Pouch Change: Three times weekly       WOC Nurse Specialist: Aide                Questions: Baljit [phone]      Follow-up Appointment: Please call to schedule an appointment after you have received samples of product.     Please call Baljit [phone] () to request an appointment.      "

## 2024-08-09 NOTE — PROGRESS NOTES
Carlito ABG from pt's left radial artery without any complications.    Pt was on 25 % on the BIPAP.    Cortney Martin, RT on 8/9/2024 at 12:23 PM

## 2024-08-09 NOTE — ED PROVIDER NOTES
Emergency Department Note      History of Present Illness     Chief Complaint   Copd Exacerbation    HPI   Lara Melendez is a 58 year old female with a history as noted below who presents to the emergency department for COPD exacerbation. The patient states that today, she began experiencing a sudden onset of shortness of breath and for 2 days, has been experiencing a sore throat. Her O2 saturations were mid 80's upon arrival. She notes that she has a hx of COPD and a pulmonary nodule. She adds that she tried 1 Duoneb and 1 albuterol treatment at home. She is on azithromycin 3 times per week and was on 10 mg of prednisone daily until today. Denies fever or chills. Denies chest pain. She notes that she was recently in contact with her grandson who was diagnosed with strep. She has a hx of AFIB and has a colostomy in place but the plan with her surgeon is to takedown this colostomy, hence her antibiotic and steroid regiment as noted above. Patient on Eliquis. She has been intubated in the past for her respiratory issues.    Independent Historian   None    Review of External Notes   Reviewed pulmonology note from 7/19/2024    Past Medical History     Medical History and Problem List   Anxiety  COPD  Diverticulitis  Hyperlipidemia  Hypertension  Hypothyroidism  PAF  Pulmonary nodule    Medications   albuterol (PROAIR HFA/PROVENTIL HFA/VENTOLIN HFA) 108 (90 Base) MCG/ACT inhaler  amiodarone (PACERONE) 200 MG tablet  apixaban ANTICOAGULANT (ELIQUIS) 5 MG tablet  atorvastatin (LIPITOR) 20 MG tablet  clonazePAM (KLONOPIN) 0.5 MG tablet  fluticasone-vilanterol (BREO ELLIPTA) 200-25 MCG/ACT inhaler  ipratropium - albuterol 0.5 mg/2.5 mg/3 mL (DUONEB) 0.5-2.5 (3) MG/3ML neb solution  levalbuterol (XOPENEX) 0.63 MG/3ML neb solution  levothyroxine (SYNTHROID/LEVOTHROID) 112 MCG tablet  metoprolol tartrate 37.5 MG TABS  PARoxetine (PAXIL) 20 MG tablet  tiotropium (SPIRIVA) 18 MCG inhaled capsule    Surgical History    Cholecystectomy  Colostomy  I&D  Tooth extraction  Vocal cord surgery    Physical Exam     Patient Vitals for the past 24 hrs:   BP Temp Temp src Pulse Resp SpO2   08/09/24 0040 (!) 149/93 -- -- 81 -- 100 %   08/09/24 0012 -- -- -- 82 -- 99 %   08/09/24 0002 (!) 135/98 -- -- 80 -- 97 %   08/08/24 2338 (!) 137/95 -- -- 82 -- 98 %   08/08/24 2318 -- -- -- 83 -- 97 %   08/08/24 2317 (!) 142/97 -- -- 82 -- 97 %   08/08/24 2308 -- -- -- 81 -- 96 %   08/08/24 2258 129/86 98  F (36.7  C) Oral 81 -- 96 %   08/08/24 2253 -- -- -- 79 23 96 %   08/08/24 2248 (!) 143/94 -- -- 86 -- 95 %   08/08/24 2238 (!) 192/124 -- -- -- -- 94 %   08/08/24 2236 (!) 192/124 -- -- 90 24 93 %     Physical Exam  General: Patient is awake, alert and interactive when I enter the room  Neck: Normal range of motion. No anterior cervical lymphadenopathy noted  CV: Regular rate. S1/S2. No murmurs.   Resp: moderate to severe respiratory distress.  Inspiratory and expiratory wheezes. Prolonged expiratory phase. No rales noted. No asymmetry to breath sounds.   GI: Abdomen is soft, no rigidity, guarding, or rebound. No distension. No tenderness to palpation in any quadrant.    MS: Normal tone. Joints grossly normal without effusions. No asymmetric leg swelling, calf or thigh tenderness.    Skin: No rash or lesions noted. Normal capillary refill noted  Neuro: Speech is normal and fluent. Face is symmetric. Moving all extremities.   Psych:  Normal affect.  Appropriate interactions.    Diagnostics     Lab Results   Labs Ordered and Resulted from Time of ED Arrival to Time of ED Departure   BASIC METABOLIC PANEL - Abnormal       Result Value    Sodium 138      Potassium 4.1      Chloride 101      Carbon Dioxide (CO2) 23      Anion Gap 14      Urea Nitrogen 6.7 (*)     Creatinine 0.85      GFR Estimate 78      Calcium 9.0      Glucose 130 (*)    BLOOD GAS VENOUS - Abnormal    pH Venous 7.33      pCO2 Venous 51 (*)     pO2 Venous 66 (*)     Bicarbonate Venous  27      Base Excess/Deficit Venous 0.2      FIO2 21      Oxyhemoglobin Venous 90 (*)     O2 Sat, Venous 93.4 (*)    INFLUENZA A/B, RSV, & SARS-COV2 PCR - Abnormal    Influenza A PCR Negative      Influenza B PCR Negative      RSV PCR Negative      SARS CoV2 PCR Positive (*)    CBC WITH PLATELETS AND DIFFERENTIAL - Abnormal    WBC Count 6.3      RBC Count 3.93      Hemoglobin 11.5 (*)     Hematocrit 36.0      MCV 92      MCH 29.3      MCHC 31.9      RDW 13.6      Platelet Count 249      % Neutrophils 78      % Lymphocytes 12      % Monocytes 9      % Eosinophils 0      % Basophils 0      % Immature Granulocytes 1      NRBCs per 100 WBC 0      Absolute Neutrophils 4.9      Absolute Lymphocytes 0.8      Absolute Monocytes 0.6      Absolute Eosinophils 0.0      Absolute Basophils 0.0      Absolute Immature Granulocytes 0.1      Absolute NRBCs 0.0     PROCALCITONIN - Normal    Procalcitonin 0.05     LACTIC ACID WHOLE BLOOD WITH 1X REPEAT IN 2 HR WHEN >2 - Normal    Lactic Acid, Initial 1.5     NT PROBNP INPATIENT - Normal    N terminal Pro BNP Inpatient 565     GROUP A STREPTOCOCCUS PCR THROAT SWAB - Normal    Group A strep by PCR Not Detected       Imaging   XR Chest Port 1 View   Final Result   IMPRESSION: Normal heart size. No evidence for CHF or pneumonia. No pleural effusion or pneumothorax. Cluster of tiny calcifications in the left upper lobe likely related to prior granulomas process and similar to chest CT 6/26/2023.        EKG   ECG results from 08/08/24   EKG 12-lead, tracing only     Value    Systolic Blood Pressure     Diastolic Blood Pressure     Ventricular Rate 81    Atrial Rate 81    FL Interval 156    QRS Duration 74        QTc 434    P Axis 85    R AXIS 62    T Axis 1    Interpretation ECG      Sinus rhythm  Nonspecific ST and T wave abnormality  Abnormal ECG  When compared with ECG of 27-Jan-2024 14:23,  Sinus rhythm has replaced Atrial fibrillation  Vent. rate has decreased by  65  bpm  Nonspecific T wave abnormality has replaced inverted T waves in Inferior leads       Independent Interpretation   CXR reviewed, no new infiltrate or pneumothorax.    ED Course      Medications Administered   Medications   ipratropium - albuterol 0.5 mg/2.5 mg/3 mL (DUONEB) neb solution 3 mL (3 mLs Nebulization $Given 8/8/24 2249)   magnesium sulfate 2 g in 50 mL sterile water intermittent infusion (0 g Intravenous Stopped 8/8/24 2308)   dexAMETHasone PF (DECADRON) injection 10 mg (10 mg Intravenous $Given 8/8/24 2246)     Discussion of Management   Admitting Hospitalist, Dr. Navarro    ED Course   ED Course as of 08/09/24 0104   u Aug 08, 2024   2234 I obtained history and examined the patient as noted above. Paged for BiPAP.     Fri Aug 09, 2024   0028 I rechecked the patient.      0103 I spoke with Dr. Navarro, admitting hospitalist, regarding the patient. He accepts patient admission.       Additional Documentation  None    Medical Decision Making / Diagnosis     CMS Diagnoses: None    MIPS       None    MDM   Lara Melendez is a 58 year old female with past medical history of severe COPD on chronic 1 L of nasal cannula oxygen who presents to the emergency department with acute respiratory distress, increasing cough and fatigue.  On initial evaluation patient had significant respiratory distress and therefore was started on BiPAP for respiratory support.  Had stabilization of her respiratory distress with BiPAP, DuoNebs, Decadron and magnesium.  Patient ultimately was found to have COVID which is likely the nidus of her COPD exacerbation.  Chest x-ray shows a chronic collection of calcifications but no evidence of CHF or pneumonia.  Due to her ongoing need for BiPAP and severe COPD will be admitted to ICU for further evaluation and treatment.     Disposition   The patient was admitted to the hospital.     Diagnosis     ICD-10-CM    1. Acute respiratory failure with hypoxia and hypercapnia (H)  J96.01      J96.02       2. COPD with acute exacerbation (H)  J44.1       3. COVID-19 virus infection  U07.1         Scribe Disclosure:  I, Kiran Jo, am serving as a scribe at 10:48 PM on 8/8/2024 to document services personally performed by Yaniv Alexandre MD based on my observations and the provider's statements to me.        Yaniv Alexandre MD  08/09/24 0338

## 2024-08-09 NOTE — PROGRESS NOTES
Hospitalist update note:  Lara Melendez is a 58 year old female with PMH including severe COPD on 2-3 L O2 requiring previous tracheostomy since did not cannulated and intubation earlier this year, paroxysmal A-fib on Eliquis, HTN, HLD, hypothyroidism, anemia, MDD, anxiety, and large bowel obstruction requiring ostomy placement who presents with severe shortness of breath.  Found to be COVID-19 positive, likely causing exacerbation of COPD.    Received IV dexamethasone yesterday evening during admission, plan to continue IV dexamethasone and remdesivir for COVID infection.  During the day had ongoing wheezing present throughout all lung fields, felt significantly short of breath with increased work of breathing, at times was in tripod position on BiPAP.  Gave additional dose of dexamethasone and kept patient on BiPAP.  Blood gases were reassuring, and after some time patient said that she felt significantly improved.  For now we will continue with IV dexamethasone and remdesivir for COVID, azithromycin added for COPD component, DuoNebs scheduled, as needed albuterol as well.  Discussed with patient that if her respiratory status continued to worsen and we felt intubation was necessary due to severe increased work of breathing she would except intubation again.  Fortunately clinically appeared to be improving some later in the day.    Femi Ortiz MD      I personally spent 50 minutes of critical care time in the patient's room, performing physical exam, reviewing labs, discussing with nursing staff, intensivist, and RT regarding patient's condition.

## 2024-08-09 NOTE — DISCHARGE INSTRUCTIONS
[facility]  WO Nurse Discharge Instructions for New Ileostomy      When you get home     - Upon arrival home, call the [facility] Nurses to schedule an outpatient follow up appoint in a few weeks after discharge from hospital.  The appointment is to assess pouching plan, organize supply ordering, etc.   Call the WO office at   [facility]      [phone]    - Supplies- Upon discharge from the hospital you will be sent home with ostomy supplies.      If you have been set up for home care visits the home home care nurse will help you coordinate ordering supplies.    If you have not been set up for home care then make sure to make a follow up appointment with the WOCN nurse to reassess your abdomen and ostomy for changes and determine the correct plan.  At that time ordering supplies will be discussed.       Pouching     1. Change appliance 2x / wk and as needed for any leakage.  Notify the [facility] Nurse if needing to change pouch more than daily or if skin is itchy.   2. Empty pouch when no more than 1/3 to half full (solid, liquid, gas)  3. May shower with pouch on or off, removing pouch/ barrier down to the skin is ok. Just note that you cannot control output so there may occasionally be clean up if you choose to shower / bathe without a pouch on.     The pouching procedure:   1.  Use the  Pouch Change Instruction  sheet to gather and organize supplies  2.  Trace old pattern onto new pouch and cut out new opening on new barrier.  3.  Remove old pouch, observe for any leakage  4.  Clean skin with water and paper towel   5.  Apply Ring around stoma  6.   Apply prepared barrier to the clean skin and press into place.  7.  Hold hand on pouch/ over stoma to warm pouch into place for 2-5 minutes      Your Pouching Plan / Supplies                       Supplies:  (Ask your home care nurse to help you order supplies.  Your product plan may change over time, as your stoma and/or your abdomen  "changes).      Cardwell 1-pc system (20 pouch changes/month)   Pouch: drainable pouch   -     #2731  (10/box = 2 box/month)      2.  Ring:  Cardwell 2\" Adapt ring -   #9705  (10/box = 2 box/month)  3.  3M Cavilon no-sting skin barrier (optional if needed)   -    #9394  (50/box = 1 box as needed)  4.  Stoma powder (if needed)   -   #7906  (1 bottle as needed)  5.  Odor eliminator & lubricant (optional)   -    #44111 (8oz bottle) or #89859 (8ml packets, 50 in a box)  (1 as needed)  6.  Michael adhesive remover spray (optional)   -    #3119  (1/box = 1 box as needed)         Activity     Walk short distances & increase as your strength allows  You may climb stairs  Do not do strenuous exercise or activity such as golfing or heavy lifting  Do not drive until approved by your doctor  Carry an extra pouching system with you or in your car, be prepared  Bathing:  You may shower with your pouch on or if a pouch change day may remove pouch and shower without a pouch on.      Diet and Hydration     Consume foods throughout the day that will help to thicken output and therefore help maintain a good seal.  Consider these foods:  Grains such as pasta, rice, soda crackers ,pretzels, cheese and yogurt, applesauce, bananas, potatoes, peanut butter and tapioca  Some foods increase/ thin your output  Sugars  Prunes, raisins   Eat 3-4 servings of protein per day  No timed release medications  Want to avoid a blockage and dehydration  1.  Avoiding a blockage  Eat 6 small meals /day. Small meals, small bites.   Avoid raw vegetables, seeds, nuts peels, grapefruit, oranges, pineapple. Watch out for peels, and tough meats like jerkies and casings.   If the food cannot be cut easily with a fork avoid for now.  IF you have a blockage try to gently massage blockage, do NOT take a laxative and call MD.     2. Avoiding dehydration  Drink plenty of fluids, minimally  eight glasses of water per day  plus if you empty your pouch, have another " glass of fluid  Remember caffeine and alcohol makes you more dehydrated, be sure to add in more fluids if consuming caffeine or alcohol  Monitor the color of your urine, if getting too dark, need more fluids  Remember to keep your input/ out put in balance and notify MD if out of balance   If you feel you are getting dehydrated then consider drinking Gatorade, Pedialyte, type of beverage     Follow up     1. Physician - follow instructions from MD for follow ups with them  Contact your physician if you have the following signs or symptoms:  Pain in your incision that is not relieved by a short rest or ordered pain medications  Chills or temperature of 101 or higher  Redness or swelling in your incision    2. WOC Nurses (ostomy nurses)        When you get home make an outpatient appointment with the WOCN nurses to assure your pouching plan is still a good plan, etc  [facility] [phone]  For [facility] appointments clinic is located in the [address] building just east of the [facility] in [address].

## 2024-08-09 NOTE — CONSULTS
Care Management Initial Consult    General Information  Assessment completed with: Patient,    Type of CM/SW Visit: Initial Assessment    Primary Care Provider verified and updated as needed:     Readmission within the last 30 days:        Reason for Consult: care coordination/care conference, discharge planning  Advance Care Planning:            Communication Assessment  Patient's communication style: spoken language (English or Bilingual)    Hearing Difficulty or Deaf: no   Wear Glasses or Blind: yes    Cognitive  Cognitive/Neuro/Behavioral: WDL                      Living Environment:   People in home: child(ron), adult, grandchild(ron), spouse     Current living Arrangements: house      Able to return to prior arrangements: yes       Family/Social Support:  Care provided by: self, spouse/significant other, child(ron)  Provides care for: no one  Marital Status:   , Children          Description of Support System:           Current Resources:   Patient receiving home care services: No     Community Resources: DME  Equipment currently used at home: none  Supplies currently used at home: Wound Care Supplies, Oxygen Tubing/Supplies    Employment/Financial:  Employment Status:          Financial Concerns:             Does the patient's insurance plan have a 3 day qualifying hospital stay waiver?  No    Lifestyle & Psychosocial Needs:  Social Determinants of Health     Food Insecurity: Not on file   Depression: Not at risk (12/13/2023)    Received from [email or web address]    PHQ-2     PHQ-2 Score: 0   Housing Stability: Not on file   Tobacco Use: Medium Risk (7/26/2024)    Received from [email or web address]    Patient History     Smoking Tobacco Use: Former     Smokeless Tobacco Use: Never     Passive Exposure: Not on file   Financial Resource Strain: Not on file   Alcohol Use: Not on file   Transportation Needs: Not on file   Physical Activity: Not on file   Interpersonal Safety: Not on file    Stress: Not on file   Social Connections: Not on file   Health Literacy: Not on file     Additional Information:  URR 25% Patient admitted with progressively worsening SOB. COVID-19 and COPD exacerbation.   Met with patient briefly in the room while assisting bedside RN. Kept assessment brief as patient is on Bipap and SOB with much activity.  She reports she is still living at home with her spouse, daughter and grandchildren. She has oxygen at hs supplied by she thinks [facility]. She has ostomy supplies through [employer]. She had questions about changing to [facility] for oxygen to see if insurance would cover it. She currently pays some out of pocket. Explained this is likely related to how her insurance covers oxygen and may not be related to the DME provider.  Patient has a established ostomy with a large prolapse that [facility] is assisting with pouching options. She was due to have a colostomy TD but has been unable due to pulmonary status.    Will attempt to see patient again once she is better able to tolerate conversation. Will continue to follow.    Bhakti Walters RN BSN OCN  Care Coordinator  [facility]  [phone]

## 2024-08-09 NOTE — PHARMACY-ADMISSION MEDICATION HISTORY
Pharmacist Admission Medication History    Admission medication history is complete. The information provided in this note is only as accurate as the sources available at the time of the update.    Information Source(s): Patient and CareEverywhere/SureScripts via in-person    Pertinent Information: None    Changes made to PTA medication list:  Added: Arnuity inhaler, Azithromycin, Cyclobenzaprine, Diltiazem, Prednisone  Deleted: Albuterol, Amiodarone  Changed:   Clonazepam 0.5mg daily --> 0.5mg BID PRN  DuoNeb 1 vial Q6H PRN --> BID    Allergies reviewed with patient and updates made in EHR: no    Medication History Completed By: Yonatan Lane [facility] 8/9/2024 11:23 AM    PTA Med List   Medication Sig Note Last Dose    apixaban ANTICOAGULANT (ELIQUIS) 5 MG tablet Take 1 tablet (5 mg) by mouth 2 times daily  8/8/2024 at x1    atorvastatin (LIPITOR) 20 MG tablet Take 20 mg by mouth daily  8/8/2024 at AM    azithromycin (ZITHROMAX) 500 MG tablet Take 500 mg by mouth Every Mon, Wed, Fri Morning  8/7/2024 at AM    clonazePAM (KLONOPIN) 0.5 MG tablet Take 0.5 mg by mouth 2 times daily as needed for anxiety 8/9/2024: Usually takes ~ 1 daily 8/8/2024 at x1    cyclobenzaprine (FLEXERIL) 10 MG tablet Take 10 mg by mouth 3 times daily as needed for muscle spasms  Past Week    diltiazem ER (DILT-XR) 240 MG 24 hr ER beaded capsule Take 240 mg by mouth daily  8/7/2024 at AM    fluticasone (ARNUITY ELLIPTA) 100 MCG/ACT inhaler Inhale 1 puff into the lungs every evening  8/7/2024 at PM    fluticasone-vilanterol (BREO ELLIPTA) 200-25 MCG/ACT inhaler Inhale 1 Dose into the lungs daily  8/8/2024 at AM    ipratropium - albuterol 0.5 mg/2.5 mg/3 mL (DUONEB) 0.5-2.5 (3) MG/3ML neb solution Take 1 vial by nebulization 2 times daily  8/8/2024 at x1    levalbuterol (XOPENEX) 0.63 MG/3ML neb solution Take 1 ampule by nebulization every 4 hours as needed for wheezing  8/8/2024    levothyroxine (SYNTHROID/LEVOTHROID) 112 MCG tablet Take 112  mcg by mouth daily  8/8/2024 at AM    metoprolol tartrate 37.5 MG TABS Take 37.5 mg by mouth 2 times daily  8/8/2024 at x1    PARoxetine (PAXIL) 20 MG tablet Take 20 mg by mouth daily  8/8/2024 at AM    predniSONE (DELTASONE) 10 MG tablet Take 10 mg by mouth daily  8/8/2024 at AM    sodium chloride (NEBUSAL) 3 % neb solution Inhale 4 mLs into the lungs 2 times daily  8/8/2024    tiotropium (SPIRIVA) 18 MCG inhaled capsule Inhale 1 capsule (18 mcg) into the lungs daily  8/8/2024 at AM

## 2024-08-09 NOTE — ED TRIAGE NOTES
Patient arrives via EMS from home. Pt has a hx of COPD and Afib. Over the course of the day been experiencing increasing shortness of breath. Patient tried home medications with no relief. Prior to arrival patient was given 1 duoneb and 1 albuterol neb.On arrival pt was 100% on 3L nc but was noted to have increased work of breathing and audible wheezing could be heard . Pt noted to be hypertensive but has a hx of htn.     Pt has had to be intubated and had a trach for respiratory distress in the past. MD called to bedside. RT paged.    Triage Assessment (Adult)       Row Name 08/08/24 1927          Respiratory WDL    Respiratory WDL X        Skin Circulation/Temperature WDL    Skin Circulation/Temperature WDL WDL        Cardiac WDL    Cardiac WDL WDL        Peripheral/Neurovascular WDL    Peripheral Neurovascular WDL WDL        Cognitive/Neuro/Behavioral WDL    Cognitive/Neuro/Behavioral WDL WDL

## 2024-08-09 NOTE — CONSULTS
[facility] Nurse Inpatient Assessment     Consulted for: Colostomy    Summary: Est colostomy with large prolapse and ineffective pouching plan    Patient History (according to provider note(s):      58 year old female with PMH including severe COPD on 2-3 L O2 requiring previous tracheostomy since did not cannulated and intubation earlier this year, paroxysmal A-fib on Eliquis, HTN, HLD, hypothyroidism, anemia, MDD, anxiety, and large bowel obstruction requiring ostomy placement who presents with severe shortness of breath.  For the last 1 to 2 days she has felt fatigued and had a headache.  Throughout the day today she became severely short of breath and wheezy.  She tried nebulizer treatments without improvement.  She has been taking prednisone 10 mg daily to try to optimize her lung status to get her ostomy takedown surgery, but today she took 30 mg due to her wheezing.  She denies any cough, myalgias, nausea, vomiting, diarrhea, chest pain.  She has had COVID-19 before and is vaccinated.  She has required intubation for COPD exacerbations the past including in January and she required a tracheostomy a little over 1 year ago.    Assessment:      Assessment of established loop Colostomy:  Diagnosis Pertinent to Stoma: Bowel Obstruction                                       Surgery Date: 7/12/23  Surgeon:[facility]: [facility]  Pouching system in place on assessment today: Michael one piece, cut to fit, convex, and barrier ring, brava strips.   Pouch barrier status: Minimal melting  Pouch last changed/wear time: 1 day patient reports 2-3 day wear time  Reason for pouch change today:  ostomy assessment  Effectiveness of current pouching/ supply plan: Attempting new system, will re-evaluate next assessment  Change made with ostomy management today: Yes  Pouching system placed today: [place] one piece, flat, barrier ring, and  ostomy strips   Supplies: discussed with RN, discussed with patient , and special order needed  Last photo: 8/9/24          Stoma location: Midline  Stoma size: 2 3/4 inches,   Stoma appearance: moist, about 6 inch prolapse- laceration from 2-9 o clock  Mucocutaneous junction:  intact  Peristomal complication(s): none   Output: soft  Output volume emptied during visit: 0ml  Abdominal assessment: Distended        Treatment Plan:     Midline Colostomy pouching plan:   Pouching system: ostomy supplies pouches: Michael Flat FECAL ([id number])   Accessories used: [facility] ostomy accessories: Brava Barrier Strip ([id number]) and Brava Ring  (patient provided   Frequency of pouch changes: Three times a week  WOC follow up plan:  follow up early next week to evaluated pouching plan  Bedside RN interventions: Change pouch PRN if leaking using the supplies above, Empty pouch when 1/3 to 1/2 full, ensure to clean pouch outlet after emptying to prevent odor, Notify WOC for ongoing pouch leakage, Document stoma appearance and output volume, color, and consistency every shift, Encourage patient to empty pouch with assist, and patient can help with pouching      Orders: Written    RECOMMEND PRIMARY TEAM ORDER: None, at this time  Education provided: plan of care  Discussed plan of care with: Patient and Nurse  WOC nurse follow-up plan:  early next week to determine effectiveness of pouching plan  Notify WOC if wound(s) deteriorate.  Nursing to notify the Provider(s) and re-consult the WOC Nurse if new skin concern.    DATA:     Current support surface: Standard  Low air loss (YONAS pump, Isolibrium, Pulsate)  Containment of urine/stool: Colostomy pouch  BMI: Body mass index is 27.79 kg/m .   Active diet order: Orders Placed This Encounter      NPO for Medical/Clinical Reasons Except for: Meds     Output: I/O last 3 completed shifts:  In: 350 [I.V.:350]  Out: -      Labs:   Recent Labs   Lab 08/08/24  2243   HGB 11.5*   WBC 6.3     Pressure  injury risk assessment:   Sensory Perception: 4-->no impairment  Moisture: 4-->rarely moist  Activity: 3-->walks occasionally  Mobility: 4-->no limitation  Nutrition: 3-->adequate  Friction and Shear: 3-->no apparent problem  Lee Score: 21    Subha Ramirez RN CWOCN  Contact Via [facility] Nurse Todd)  Dept. Office Number: [phone]

## 2024-08-09 NOTE — PLAN OF CARE
ICU End of Shift Summary.  For vital signs and complete assessments, please see documentation flowsheets.      Pertinent assessments:   Neuro: A/O x4, afebrile, C/o HA, PRN effective  Cardiac: SR to ST, HR 's. BP HTN, hydralizine PRN given, effective.   Resp: LS tight. Inspiratory & expiratory wheezing, air movement increasing throughout shift. On NC 2L for 1 hour, Bipap FiO2 25%. Pt able to transition off bipap to 2L NC safely for PO medications and PO cares. Once meds/cares are completed pt does return to bipap.  GI: BS normoactive. Remains NPO. Colostomy dressing changed with WOC. Ostomy protuding, pink, WOC reports protrusion of ostomy is ongoing  : Voiding spontaneously. Pt was incon't when coughing today. Pt has a maxi-pad in place for leaks. Using [facility]  Skin: WOC consulted regarding ostomy  Lines: 1 PIV  Drips: none    Major Shift Events:   ~Around 1125 pt reported feeling short of breath while on NC 2L. Pt requested to be placed back on bipap, pt was placed back on bipap, o2 increased and RT called.  Pt lung sounds at time of SOB, insp & exp wheezes with decreased movement of air. Writer provided verbal guidance for anxiety and breathing while awaiting RT. RT increased EPAP and IPAP, along with providing neb treatments. Pt sat up at bedside, leaning on to BS table for stability, placing body into tripod. After about 10 minutes pt reported feeling better but remained sitting on edge of bed.     Plan (Upcoming Events): Con't with bipap for increased WOB, 4 more days of remdesiver, con't with steroids and nebs, monitor cognition and breathing status  Discharge/Transfer Needs: TBD     Bedside Shift Report Completed : yes  Bedside Safety Check Completed: Yes     Goal Outcome Evaluation:      Plan of Care Reviewed With: patient    Overall Patient Progress: no changeOverall Patient Progress: no change    Outcome Evaluation: Remains on bipap. 2L NC for 1 hr today. Tachypneic, increased WOB after 1 hr NC,  "placed back on bipap with increase in pressures via RT.      Problem: Adult Inpatient Plan of Care  Goal: Plan of Care Review  Description: The Plan of Care Review/Shift note should be completed every shift.  The Outcome Evaluation is a brief statement about your assessment that the patient is improving, declining, or no change.  This information will be displayed automatically on your shift  note.  Outcome: Progressing  Flowsheets (Taken 8/9/2024 1526)  Outcome Evaluation: Remains on bipap. 2L NC for 1 hr today. Tachypneic, increased WOB after 1 hr NC, placed back on bipap with increase in pressures via RT.  Plan of Care Reviewed With: patient  Overall Patient Progress: no change  Goal: Patient-Specific Goal (Individualized)  Description: You can add care plan individualizations to a care plan. Examples of Individualization might be:  \"Parent requests to be called daily at 9am for status\", \"I have a hard time hearing out of my right ear\", or \"Do not touch me to wake me up as it startles  me\".  Outcome: Progressing  Flowsheets (Taken 8/9/2024 1527)  Individualized Care Needs: claustraphohic, reminders that bipap is providing pressure into her lungs to breath  Anxieties, Fears or Concerns: Claustraphobic  Patient/Family-Specific Goals (Include Timeframe): gentle reminders of how to breath when having difficulties, this will help to decrease anxiety  Goal: Absence of Hospital-Acquired Illness or Injury  Outcome: Progressing  Intervention: Identify and Manage Fall Risk  Recent Flowsheet Documentation  Taken 8/9/2024 1115 by Aaliyah Turner, RN  Safety Promotion/Fall Prevention:   activity supervised   clutter free environment maintained   lighting adjusted   nonskid shoes/slippers when out of bed   safety round/check completed  Taken 8/9/2024 0800 by Aaliyah Turner, RN  Safety Promotion/Fall Prevention:   activity supervised   clutter free environment maintained   lighting adjusted   nonskid shoes/slippers when out of bed   " safety round/check completed  Intervention: Prevent Skin Injury  Recent Flowsheet Documentation  Taken 8/9/2024 1500 by Aaliyah Turner RN  Body Position: position changed independently  Taken 8/9/2024 1115 by Aaliyah Turner RN  Body Position: position changed independently  Skin Protection: adhesive use limited  Device Skin Pressure Protection:   adhesive use limited   tubing/devices free from skin contact  Taken 8/9/2024 0800 by Aaliyah Turner RN  Body Position: position changed independently  Skin Protection: adhesive use limited  Device Skin Pressure Protection:   adhesive use limited   tubing/devices free from skin contact  Intervention: Prevent and Manage VTE (Venous Thromboembolism) Risk  Recent Flowsheet Documentation  Taken 8/9/2024 1115 by Aaliyah Turner RN  VTE Prevention/Management: (anticoagulants)   SCDs off (sequential compression devices)   other (see comments)  Taken 8/9/2024 0800 by Aaliyah Turner RN  VTE Prevention/Management: (anticoagulants)   SCDs off (sequential compression devices)   other (see comments)  Intervention: Prevent Infection  Recent Flowsheet Documentation  Taken 8/9/2024 1115 by Aaliyah Turner RN  Infection Prevention:   environmental surveillance performed   equipment surfaces disinfected   hand hygiene promoted   personal protective equipment utilized   rest/sleep promoted   single patient room provided  Taken 8/9/2024 0800 by Aaliyah Turner RN  Infection Prevention:   environmental surveillance performed   equipment surfaces disinfected   hand hygiene promoted   personal protective equipment utilized   rest/sleep promoted   single patient room provided  Goal: Optimal Comfort and Wellbeing  Outcome: Progressing  Intervention: Monitor Pain and Promote Comfort  Recent Flowsheet Documentation  Taken 8/9/2024 1050 by Aaliyah Turner RN  Pain Management Interventions: medication (see MAR)  Intervention: Provide Person-Centered Care  Recent Flowsheet Documentation  Taken 8/9/2024 1115 by Aaliyah Turner RN  Trust  Relationship/Rapport:   care explained   choices provided   emotional support provided   empathic listening provided   questions answered   questions encouraged   reassurance provided   thoughts/feelings acknowledged  Taken 8/9/2024 0800 by Aaliyah Turner RN  Trust Relationship/Rapport:   care explained   choices provided   emotional support provided   empathic listening provided   questions answered   questions encouraged   reassurance provided   thoughts/feelings acknowledged  Goal: Readiness for Transition of Care  Outcome: Not Progressing  Flowsheets (Taken 8/9/2024 1534)  Anticipated Changes Related to Illness: inability to care for self  Transportation Anticipated: family or friend will provide  Concerns to be Addressed:   coping/stress   discharge planning  Barriers to Discharge: remains on bipap, unable to transition to NC d/t increased WOB  Intervention: Mutually Develop Transition Plan  Recent Flowsheet Documentation  Taken 8/9/2024 1534 by Aaliyah Turner, RN  Anticipated Changes Related to Illness: inability to care for self  Transportation Anticipated: family or friend will provide  Concerns to be Addressed:   coping/stress   discharge planning

## 2024-08-10 LAB
ALBUMIN SERPL BCG-MCNC: 3.9 G/DL (ref 3.5–5.2)
ALP SERPL-CCNC: 87 U/L (ref 40–150)
ALT SERPL W P-5'-P-CCNC: 32 U/L (ref 0–50)
ANION GAP SERPL CALCULATED.3IONS-SCNC: 13 MMOL/L (ref 7–15)
AST SERPL W P-5'-P-CCNC: 17 U/L (ref 0–45)
BILIRUB SERPL-MCNC: 0.2 MG/DL
BUN SERPL-MCNC: 17.4 MG/DL (ref 8–23)
CALCIUM SERPL-MCNC: 9.1 MG/DL (ref 8.8–10.4)
CHLORIDE SERPL-SCNC: 103 MMOL/L (ref 98–107)
CREAT SERPL-MCNC: 0.72 MG/DL (ref 0.51–0.95)
EGFRCR SERPLBLD CKD-EPI 2021: >90 ML/MIN/1.73M2
ERYTHROCYTE [DISTWIDTH] IN BLOOD BY AUTOMATED COUNT: 13.7 % (ref 10–15)
GLUCOSE BLDC GLUCOMTR-MCNC: 109 MG/DL (ref 70–99)
GLUCOSE BLDC GLUCOMTR-MCNC: 116 MG/DL (ref 70–99)
GLUCOSE BLDC GLUCOMTR-MCNC: 117 MG/DL (ref 70–99)
GLUCOSE BLDC GLUCOMTR-MCNC: 119 MG/DL (ref 70–99)
GLUCOSE BLDC GLUCOMTR-MCNC: 120 MG/DL (ref 70–99)
GLUCOSE BLDC GLUCOMTR-MCNC: 122 MG/DL (ref 70–99)
GLUCOSE BLDC GLUCOMTR-MCNC: 142 MG/DL (ref 70–99)
GLUCOSE BLDC GLUCOMTR-MCNC: 164 MG/DL (ref 70–99)
GLUCOSE SERPL-MCNC: 115 MG/DL (ref 70–99)
HCO3 SERPL-SCNC: 25 MMOL/L (ref 22–29)
HCT VFR BLD AUTO: 36 % (ref 35–47)
HGB BLD-MCNC: 11.4 G/DL (ref 11.7–15.7)
HOLD SPECIMEN: NORMAL
HOLD SPECIMEN: NORMAL
MCH RBC QN AUTO: 29.4 PG (ref 26.5–33)
MCHC RBC AUTO-ENTMCNC: 31.7 G/DL (ref 31.5–36.5)
MCV RBC AUTO: 93 FL (ref 78–100)
PLATELET # BLD AUTO: 248 10E3/UL (ref 150–450)
POTASSIUM SERPL-SCNC: 4.3 MMOL/L (ref 3.4–5.3)
PROT SERPL-MCNC: 7.2 G/DL (ref 6.4–8.3)
RBC # BLD AUTO: 3.88 10E6/UL (ref 3.8–5.2)
SODIUM SERPL-SCNC: 141 MMOL/L (ref 135–145)
WBC # BLD AUTO: 9.4 10E3/UL (ref 4–11)

## 2024-08-10 PROCEDURE — 258N000003 HC RX IP 258 OP 636: Performed by: INTERNAL MEDICINE

## 2024-08-10 PROCEDURE — 36415 COLL VENOUS BLD VENIPUNCTURE: CPT | Performed by: STUDENT IN AN ORGANIZED HEALTH CARE EDUCATION/TRAINING PROGRAM

## 2024-08-10 PROCEDURE — 94660 CPAP INITIATION&MGMT: CPT

## 2024-08-10 PROCEDURE — 250N000009 HC RX 250: Performed by: INTERNAL MEDICINE

## 2024-08-10 PROCEDURE — 250N000013 HC RX MED GY IP 250 OP 250 PS 637: Performed by: STUDENT IN AN ORGANIZED HEALTH CARE EDUCATION/TRAINING PROGRAM

## 2024-08-10 PROCEDURE — 94640 AIRWAY INHALATION TREATMENT: CPT | Mod: 76

## 2024-08-10 PROCEDURE — 99232 SBSQ HOSP IP/OBS MODERATE 35: CPT | Performed by: STUDENT IN AN ORGANIZED HEALTH CARE EDUCATION/TRAINING PROGRAM

## 2024-08-10 PROCEDURE — 250N000013 HC RX MED GY IP 250 OP 250 PS 637: Performed by: INTERNAL MEDICINE

## 2024-08-10 PROCEDURE — 200N000001 HC R&B ICU

## 2024-08-10 PROCEDURE — 94640 AIRWAY INHALATION TREATMENT: CPT

## 2024-08-10 PROCEDURE — 250N000011 HC RX IP 250 OP 636: Performed by: INTERNAL MEDICINE

## 2024-08-10 PROCEDURE — 999N000157 HC STATISTIC RCP TIME EA 10 MIN

## 2024-08-10 PROCEDURE — 85027 COMPLETE CBC AUTOMATED: CPT | Performed by: STUDENT IN AN ORGANIZED HEALTH CARE EDUCATION/TRAINING PROGRAM

## 2024-08-10 PROCEDURE — 82247 BILIRUBIN TOTAL: CPT | Performed by: STUDENT IN AN ORGANIZED HEALTH CARE EDUCATION/TRAINING PROGRAM

## 2024-08-10 RX ORDER — OLANZAPINE 10 MG/2ML
5 INJECTION, POWDER, FOR SOLUTION INTRAMUSCULAR 2 TIMES DAILY PRN
Status: DISCONTINUED | OUTPATIENT
Start: 2024-08-10 | End: 2024-08-10

## 2024-08-10 RX ORDER — DILTIAZEM HYDROCHLORIDE 240 MG/1
240 CAPSULE, COATED, EXTENDED RELEASE ORAL DAILY
Status: DISCONTINUED | OUTPATIENT
Start: 2024-08-10 | End: 2024-08-19 | Stop reason: HOSPADM

## 2024-08-10 RX ORDER — OLANZAPINE 5 MG/1
5 TABLET, ORALLY DISINTEGRATING ORAL 2 TIMES DAILY PRN
Status: DISCONTINUED | OUTPATIENT
Start: 2024-08-10 | End: 2024-08-10

## 2024-08-10 RX ORDER — FLUTICASONE FUROATE AND VILANTEROL 200; 25 UG/1; UG/1
1 POWDER RESPIRATORY (INHALATION) DAILY
Status: DISCONTINUED | OUTPATIENT
Start: 2024-08-10 | End: 2024-08-19 | Stop reason: HOSPADM

## 2024-08-10 RX ADMIN — SODIUM CHLORIDE, PRESERVATIVE FREE 50 ML: 5 INJECTION INTRAVENOUS at 04:03

## 2024-08-10 RX ADMIN — LEVOTHYROXINE SODIUM 112 MCG: 0.11 TABLET ORAL at 08:28

## 2024-08-10 RX ADMIN — ATORVASTATIN CALCIUM 20 MG: 20 TABLET, FILM COATED ORAL at 08:28

## 2024-08-10 RX ADMIN — IPRATROPIUM BROMIDE AND ALBUTEROL SULFATE 3 ML: .5; 3 SOLUTION RESPIRATORY (INHALATION) at 07:46

## 2024-08-10 RX ADMIN — IPRATROPIUM BROMIDE AND ALBUTEROL SULFATE 3 ML: .5; 3 SOLUTION RESPIRATORY (INHALATION) at 15:37

## 2024-08-10 RX ADMIN — LORAZEPAM 0.5 MG: 0.5 TABLET ORAL at 21:14

## 2024-08-10 RX ADMIN — SODIUM CHLORIDE SOLN NEBU 3% 4 ML: 3 NEBU SOLN at 07:46

## 2024-08-10 RX ADMIN — DILTIAZEM HYDROCHLORIDE 240 MG: 240 CAPSULE, COATED, EXTENDED RELEASE ORAL at 10:52

## 2024-08-10 RX ADMIN — APIXABAN 5 MG: 5 TABLET, FILM COATED ORAL at 21:14

## 2024-08-10 RX ADMIN — METOPROLOL TARTRATE 37.5 MG: 25 TABLET, FILM COATED ORAL at 21:13

## 2024-08-10 RX ADMIN — DEXAMETHASONE SODIUM PHOSPHATE 6 MG: 10 INJECTION, SOLUTION INTRAMUSCULAR; INTRAVENOUS at 08:28

## 2024-08-10 RX ADMIN — LORAZEPAM 0.5 MG: 0.5 TABLET ORAL at 08:47

## 2024-08-10 RX ADMIN — REMDESIVIR 100 MG: 100 INJECTION, POWDER, LYOPHILIZED, FOR SOLUTION INTRAVENOUS at 04:02

## 2024-08-10 RX ADMIN — IPRATROPIUM BROMIDE AND ALBUTEROL SULFATE 3 ML: .5; 3 SOLUTION RESPIRATORY (INHALATION) at 11:03

## 2024-08-10 RX ADMIN — LORAZEPAM 0.5 MG: 0.5 TABLET ORAL at 12:43

## 2024-08-10 RX ADMIN — ACETAMINOPHEN 650 MG: 325 TABLET, FILM COATED ORAL at 21:42

## 2024-08-10 RX ADMIN — FLUTICASONE FUROATE AND VILANTEROL 1 PUFF: 200; 25 POWDER RESPIRATORY (INHALATION) at 11:36

## 2024-08-10 RX ADMIN — METOPROLOL TARTRATE 37.5 MG: 25 TABLET, FILM COATED ORAL at 08:28

## 2024-08-10 RX ADMIN — LORAZEPAM 0.5 MG: 0.5 TABLET ORAL at 01:09

## 2024-08-10 RX ADMIN — IPRATROPIUM BROMIDE AND ALBUTEROL SULFATE 3 ML: .5; 3 SOLUTION RESPIRATORY (INHALATION) at 19:49

## 2024-08-10 RX ADMIN — PAROXETINE HYDROCHLORIDE 20 MG: 20 TABLET, FILM COATED ORAL at 08:29

## 2024-08-10 RX ADMIN — APIXABAN 5 MG: 5 TABLET, FILM COATED ORAL at 08:28

## 2024-08-10 RX ADMIN — SODIUM CHLORIDE SOLN NEBU 3% 4 ML: 3 NEBU SOLN at 19:49

## 2024-08-10 RX ADMIN — ACETAMINOPHEN 650 MG: 325 TABLET, FILM COATED ORAL at 01:09

## 2024-08-10 RX ADMIN — CLONAZEPAM 0.5 MG: 0.5 TABLET ORAL at 08:29

## 2024-08-10 RX ADMIN — ACETAMINOPHEN 650 MG: 325 TABLET, FILM COATED ORAL at 11:35

## 2024-08-10 ASSESSMENT — ACTIVITIES OF DAILY LIVING (ADL)
ADLS_ACUITY_SCORE: 29
ADLS_ACUITY_SCORE: 26
ADLS_ACUITY_SCORE: 29
ADLS_ACUITY_SCORE: 26
ADLS_ACUITY_SCORE: 29
ADLS_ACUITY_SCORE: 29
ADLS_ACUITY_SCORE: 26
ADLS_ACUITY_SCORE: 29
ADLS_ACUITY_SCORE: 26
ADLS_ACUITY_SCORE: 26
ADLS_ACUITY_SCORE: 29
ADLS_ACUITY_SCORE: 26
ADLS_ACUITY_SCORE: 29

## 2024-08-10 NOTE — PROVIDER NOTIFICATION
"Pt remained on BiPAP all shift, tolerating it ok. Pt became anxious, pressures increased for a little while for more support but able to come back down just fine. BS remain diminished and expiratory wheezes, pt request PRN x1 overnight. Pt settings and parameters as follows       08/09/24 2004 08/09/24 2338 08/10/24 0353   CPAP/BiPAP/Settings   IPAP/EPAP (cmH2O) 18/6 (S)  20/8 (S)  18/6   Rate (breaths/min) 14 14 14   Oxygen (%) 25 45 40   Timed Inspiration (sec) 0.85 0.85 0.85   IPAP RISE  Settings (V60) 3 3 3   CPAP/BiPAP Patient Parameters   IPAP (cm H2O) 18 cmH2O 20 cmH2O 18 cmH2O   EPAP (cm H2O) 6 cmH2O 8 cmH2O 6 cmH2O   Pressure Support (cm H2O) 12 cmH2O 12 cmH2O 12 cmH2O   RR Total (breaths/min) 21 breaths/min 25 breaths/min 25 breaths/min   Vt (mL) 614 mL 637 mL 361 mL   Minute Ventilation (L/min) 12.7 L/min 15.8 L/min 9 L/min   Peak Inspiratory Pressure (cm H2O) 18 cmH2O 14 cmH2O 14 cmH2O   Pt.  Leak (L/min) 2 L/min 20 L/min 43 L/min       BP (!) 149/108 (BP Location: Right arm)   Pulse 87   Temp 97.6  F (36.4  C) (Axillary)   Resp (!) 42   Ht 1.613 m (5' 3.5\")   Wt 72.4 kg (159 lb 9.8 oz)   SpO2 96%   BMI 27.83 kg/m      Shanika Frank, RT    "

## 2024-08-10 NOTE — PLAN OF CARE
ICU End of Shift Summary.  For vital signs and complete assessments, please see documentation flowsheets.      Pertinent assessments:   Neuro: A/O x4, afebrile. Denies pain. Anxiety elevated. Worried about breathing and concern of being intubated. Pt reassured that breathing has improved.   Cardiac:SR with occasional PVCs. HR 80-90, BP HTN, scheduled antiHTN meds effective.   Resp: LS coarse bilaterally, exp. Wheezing. Bipap 20/6 in am, transitioned to HFNC around 1100  GI: BS active. Ostomy intact, emptied light brown-clear liquid, moderate amount.  : Voided 1 on BSC.   Lines:1 PIV    Major Shift Events:   ~ HFNC started around 1100 at 60L & 45%, decreased in afternoon to 45L and 40%.    Plan (Upcoming Events): Con't on HFNC, decreasing pressure as able. Con't with steroids and meds  Discharge/Transfer Needs: TBD     Bedside Shift Report Completed : Yes  Bedside Safety Check Completed: Yes  Goal Outcome Evaluation:      Plan of Care Reviewed With: patient    Overall Patient Progress: improvingOverall Patient Progress: improving    Outcome Evaluation: Transitioned to HFNC tolerating well, able to reduce pressure support. Received ativan for anxiety, pt reports effective Tylenol for HA, pt reports effective in reduction of HA.

## 2024-08-10 NOTE — PLAN OF CARE
"Goal Outcome Evaluation:      Plan of Care Reviewed With: patient    Overall Patient Progress: decliningOverall Patient Progress: declining    Outcome Evaluation: Pt. alert and oriented - remains on bipap - increased settings 20/8 and fio2 for pt. comfort. Increased anxiety - pt. stating feeling the need to be intubated. PRN ativan given x2 with relief per patient. Increased WOB, and wheezes insp. and exp. PRN neb given. Tylenol given for abd pain near stoma - with relief per patient. Pt. majority of the night stayed in tripod position @ bedside. Bed pan offered due to increased WOB. Sips of water with meds otherwise NPO. Continue plan of care.      Problem: Adult Inpatient Plan of Care  Goal: Plan of Care Review  Description: The Plan of Care Review/Shift note should be completed every shift.  The Outcome Evaluation is a brief statement about your assessment that the patient is improving, declining, or no change.  This information will be displayed automatically on your shift  note.  Outcome: Not Progressing  Flowsheets (Taken 8/10/2024 0411)  Outcome Evaluation: Pt. alert and oriented - remains on bipap - increased settings 20/8 and fio2 for pt. comfort. Increased anxiety - pt. stating feeling the need to be intubated. PRN ativan given x2 with relief per patient. Increased WOB, and wheezes insp. and exp. PRN neb given. Tylenol given for abd pain near stoma - with relief per patient. Pt. majority of the night stayed in tripod position @ bedside. Bed pan offered due to increased WOB. Sips of water with meds otherwise NPO. Continue plan of care.  Plan of Care Reviewed With: patient  Overall Patient Progress: declining  Goal: Patient-Specific Goal (Individualized)  Description: You can add care plan individualizations to a care plan. Examples of Individualization might be:  \"Parent requests to be called daily at 9am for status\", \"I have a hard time hearing out of my right ear\", or \"Do not touch me to wake me up as it " "startles  me\".  Outcome: Not Progressing  Goal: Absence of Hospital-Acquired Illness or Injury  Outcome: Not Progressing  Intervention: Identify and Manage Fall Risk  Recent Flowsheet Documentation  Taken 8/10/2024 0400 by Emily Acevedo RN  Safety Promotion/Fall Prevention:   activity supervised   clutter free environment maintained   increased rounding and observation   increase visualization of patient   lighting adjusted   mobility aid in reach   patient and family education   nonskid shoes/slippers when out of bed   room near nurse's station   safety round/check completed   supervised activity   treat underlying cause  Taken 8/10/2024 0000 by Emily Acevedo RN  Safety Promotion/Fall Prevention:   activity supervised   clutter free environment maintained   increased rounding and observation   increase visualization of patient   lighting adjusted   mobility aid in reach   patient and family education   nonskid shoes/slippers when out of bed   room near nurse's station   safety round/check completed   supervised activity   treat underlying cause  Taken 8/9/2024 2040 by Emily Acevedo RN  Safety Promotion/Fall Prevention:   activity supervised   clutter free environment maintained   increased rounding and observation   increase visualization of patient   lighting adjusted   mobility aid in reach   patient and family education   nonskid shoes/slippers when out of bed   room near nurse's station   safety round/check completed   supervised activity   treat underlying cause  Intervention: Prevent Skin Injury  Recent Flowsheet Documentation  Taken 8/10/2024 0400 by Emily Acevedo RN  Body Position: position changed independently  Skin Protection: adhesive use limited  Device Skin Pressure Protection:   adhesive use limited   tubing/devices free from skin contact  Taken 8/10/2024 0211 by Emily Acevedo RN  Body Position: position changed independently  Taken 8/10/2024 0000 by Emily Acevedo RN  Body Position: " (tripod @ edge of bed - increased WOB.) other (see comments)  Skin Protection: adhesive use limited  Device Skin Pressure Protection:   adhesive use limited   tubing/devices free from skin contact  Taken 8/9/2024 2200 by Emily Acevedo RN  Body Position: position changed independently  Taken 8/9/2024 2040 by Emily Acevedo RN  Body Position: position changed independently  Skin Protection: adhesive use limited  Device Skin Pressure Protection:   adhesive use limited   tubing/devices free from skin contact  Intervention: Prevent and Manage VTE (Venous Thromboembolism) Risk  Recent Flowsheet Documentation  Taken 8/10/2024 0400 by Emily Acevedo RN  VTE Prevention/Management: (eliquis) SCDs off (sequential compression devices)  Taken 8/10/2024 0000 by Emily Acevedo RN  VTE Prevention/Management: (eliquis) SCDs off (sequential compression devices)  Taken 8/9/2024 2040 by Emily Acevedo RN  VTE Prevention/Management: (eliquis) SCDs off (sequential compression devices)  Intervention: Prevent Infection  Recent Flowsheet Documentation  Taken 8/10/2024 0400 by Emily Acevedo RN  Infection Prevention:   environmental surveillance performed   equipment surfaces disinfected   hand hygiene promoted   personal protective equipment utilized   rest/sleep promoted   single patient room provided  Taken 8/10/2024 0000 by Emily Acevedo RN  Infection Prevention:   environmental surveillance performed   equipment surfaces disinfected   hand hygiene promoted   personal protective equipment utilized   rest/sleep promoted   single patient room provided  Taken 8/9/2024 2040 by Emily Acevedo RN  Infection Prevention:   environmental surveillance performed   equipment surfaces disinfected   hand hygiene promoted   personal protective equipment utilized   rest/sleep promoted   single patient room provided  Goal: Optimal Comfort and Wellbeing  Outcome: Not Progressing  Intervention: Monitor Pain and Promote Comfort  Recent Flowsheet  Documentation  Taken 8/10/2024 0109 by Emily Acevedo RN  Pain Management Interventions: medication (see MAR)  Intervention: Provide Person-Centered Care  Recent Flowsheet Documentation  Taken 8/10/2024 0400 by Emily Acevedo RN  Trust Relationship/Rapport:   care explained   choices provided   emotional support provided   empathic listening provided   questions answered   questions encouraged   reassurance provided   thoughts/feelings acknowledged  Taken 8/10/2024 0000 by Emily Acevedo RN  Trust Relationship/Rapport:   care explained   choices provided   emotional support provided   empathic listening provided   questions answered   questions encouraged   reassurance provided   thoughts/feelings acknowledged  Taken 8/9/2024 2040 by Emily Acevedo RN  Trust Relationship/Rapport:   care explained   choices provided   emotional support provided   empathic listening provided   questions answered   questions encouraged   reassurance provided   thoughts/feelings acknowledged  Goal: Readiness for Transition of Care  Outcome: Not Progressing     Problem: Skin Injury Risk Increased  Goal: Skin Health and Integrity  Outcome: Not Progressing  Intervention: Plan: Nurse Driven Intervention: Moisture Management  Recent Flowsheet Documentation  Taken 8/10/2024 0400 by Emily Acevedo RN  Bathing/Skin Care: moisturizer applied  Taken 8/10/2024 0000 by Emily Acevedo RN  Bathing/Skin Care: moisturizer applied  Taken 8/9/2024 2040 by Emily Acevedo RN  Bathing/Skin Care: moisturizer applied  Intervention: Optimize Skin Protection  Recent Flowsheet Documentation  Taken 8/10/2024 0400 by Emily Acevedo RN  Pressure Reduction Techniques:   frequent weight shift encouraged   rest period provided between sit times   heels elevated off bed  Pressure Reduction Devices: specialty bed utilized  Skin Protection: adhesive use limited  Activity Management: activity adjusted per tolerance  Head of Bed (HOB) Positioning: HOB at 30-45  degrees  Taken 8/10/2024 0211 by Emily Acevedo RN  Head of Bed (HOB) Positioning: HOB at 30-45 degrees  Taken 8/10/2024 0000 by Emily Acevedo RN  Pressure Reduction Techniques:   frequent weight shift encouraged   rest period provided between sit times   heels elevated off bed  Pressure Reduction Devices: specialty bed utilized  Skin Protection: adhesive use limited  Activity Management: activity adjusted per tolerance  Head of Bed (HOB) Positioning: HOB at 30-45 degrees  Taken 8/9/2024 2200 by Emily Acevedo RN  Head of Bed (HOB) Positioning: HOB at 30-45 degrees  Taken 8/9/2024 2040 by Emily Acevedo RN  Pressure Reduction Techniques:   frequent weight shift encouraged   rest period provided between sit times   heels elevated off bed  Pressure Reduction Devices: specialty bed utilized  Skin Protection: adhesive use limited  Activity Management: activity adjusted per tolerance  Head of Bed (HOB) Positioning: HOB at 30-45 degrees  Intervention: Promote and Optimize Oral Intake  Recent Flowsheet Documentation  Taken 8/10/2024 0400 by Emily Acevedo RN  Oral Nutrition Promotion: rest periods promoted  Taken 8/10/2024 0000 by Emily Acevedo RN  Oral Nutrition Promotion: rest periods promoted  Taken 8/9/2024 2040 by Emily Acevedo RN  Oral Nutrition Promotion: rest periods promoted     Problem: Infection  Goal: Absence of Infection Signs and Symptoms  Outcome: Not Progressing  Intervention: Prevent or Manage Infection  Recent Flowsheet Documentation  Taken 8/10/2024 0400 by Emily Acevedo RN  Infection Management: aseptic technique maintained  Isolation Precautions: special precautions maintained  Taken 8/10/2024 0000 by Emily Acevedo RN  Infection Management: aseptic technique maintained  Isolation Precautions: special precautions maintained  Taken 8/9/2024 2040 by Emily Acevedo RN  Infection Management: aseptic technique maintained  Isolation Precautions: special precautions maintained     Problem: COPD  (Chronic Obstructive Pulmonary Disease)  Goal: Optimal Chronic Illness Coping  Outcome: Not Progressing  Intervention: Support and Optimize Psychosocial Response  Recent Flowsheet Documentation  Taken 8/10/2024 0400 by Emily Acevedo RN  Supportive Measures:   active listening utilized   decision-making supported   goal-setting facilitated   self-care encouraged   self-responsibility promoted   verbalization of feelings encouraged  Taken 8/10/2024 0000 by Emily Acevedo RN  Supportive Measures:   active listening utilized   decision-making supported   goal-setting facilitated   self-care encouraged   self-responsibility promoted   verbalization of feelings encouraged  Taken 8/9/2024 2040 by Emily Acevedo RN  Supportive Measures:   active listening utilized   decision-making supported   goal-setting facilitated   self-care encouraged   self-responsibility promoted   verbalization of feelings encouraged  Goal: Optimal Level of Functional [facility]  Outcome: Not Progressing  Intervention: Optimize Functional Ability  Recent Flowsheet Documentation  Taken 8/10/2024 0400 by Emily Acevedo RN  Self-Care Promotion: independence encouraged  Activity Management: activity adjusted per tolerance  Environmental Support:   calm environment promoted   rest periods encouraged  Taken 8/10/2024 0000 by Emily Acevedo RN  Self-Care Promotion: independence encouraged  Activity Management: activity adjusted per tolerance  Environmental Support:   calm environment promoted   rest periods encouraged  Taken 8/9/2024 2040 by Emily Acevedo RN  Self-Care Promotion: independence encouraged  Activity Management: activity adjusted per tolerance  Environmental Support:   calm environment promoted   rest periods encouraged  Goal: Absence of Infection Signs and Symptoms  Outcome: Not Progressing  Intervention: Prevent or Manage Infection  Recent Flowsheet Documentation  Taken 8/10/2024 0400 by Emily Acevedo RN  Infection Management:  aseptic technique maintained  Isolation Precautions: special precautions maintained  Taken 8/10/2024 0000 by Emily Acevedo RN  Infection Management: aseptic technique maintained  Isolation Precautions: special precautions maintained  Taken 8/9/2024 2040 by Emily Acevedo RN  Infection Management: aseptic technique maintained  Isolation Precautions: special precautions maintained  Goal: Improved Oral Intake  Outcome: Not Progressing  Intervention: Promote and Optimize Oral Intake  Recent Flowsheet Documentation  Taken 8/10/2024 0400 by Emily Acevedo RN  Oral Nutrition Promotion: rest periods promoted  Taken 8/10/2024 0000 by Emily Acevedo RN  Oral Nutrition Promotion: rest periods promoted  Taken 8/9/2024 2040 by Emily Acevedo RN  Oral Nutrition Promotion: rest periods promoted  Goal: Effective Oxygenation and Ventilation  Outcome: Not Progressing  Intervention: Promote Airway Secretion Clearance  Recent Flowsheet Documentation  Taken 8/10/2024 0400 by Emily Acevedo RN  Breathing Techniques/Airway Clearance:   tripod positioning facilitated   deep/controlled cough encouraged  Cough And Deep Breathing: done independently per patient  Activity Management: activity adjusted per tolerance  Taken 8/10/2024 0000 by Emily Acevedo RN  Breathing Techniques/Airway Clearance:   tripod positioning facilitated   deep/controlled cough encouraged  Cough And Deep Breathing: done independently per patient  Activity Management: activity adjusted per tolerance  Taken 8/9/2024 2040 by Emily Acevedo RN  Breathing Techniques/Airway Clearance:   tripod positioning facilitated   deep/controlled cough encouraged  Cough And Deep Breathing: done independently per patient  Activity Management: activity adjusted per tolerance  Intervention: Optimize Oxygenation and Ventilation  Recent Flowsheet Documentation  Taken 8/10/2024 0400 by Emily Acevedo RN  Airway/Ventilation Management:   airway patency maintained   calming measures  promoted   pulmonary hygiene promoted  Head of Bed (HOB) Positioning: HOB at 30-45 degrees  Taken 8/10/2024 0211 by Emily Acevedo RN  Head of Bed (HOB) Positioning: HOB at 30-45 degrees  Taken 8/10/2024 0000 by Emily Acevedo RN  Airway/Ventilation Management:   airway patency maintained   calming measures promoted   pulmonary hygiene promoted  Head of Bed (HOB) Positioning: HOB at 30-45 degrees  Taken 8/9/2024 2200 by Emily Acevedo RN  Head of Bed (HOB) Positioning: HOB at 30-45 degrees  Taken 8/9/2024 2040 by Emily Acevedo RN  Airway/Ventilation Management:   airway patency maintained   calming measures promoted   pulmonary hygiene promoted  Head of Bed (HOB) Positioning: HOB at 30-45 degrees     Problem: Comorbidity Management  Goal: Maintenance of COPD Symptom Control  Outcome: Not Progressing  Intervention: Maintain COPD Symptom Control  Recent Flowsheet Documentation  Taken 8/10/2024 0400 by Emily Acevedo RN  Supportive Measures:   active listening utilized   decision-making supported   goal-setting facilitated   self-care encouraged   self-responsibility promoted   verbalization of feelings encouraged  Medication Review/Management: medications reviewed  Taken 8/10/2024 0000 by Emily Acevedo RN  Supportive Measures:   active listening utilized   decision-making supported   goal-setting facilitated   self-care encouraged   self-responsibility promoted   verbalization of feelings encouraged  Medication Review/Management: medications reviewed  Taken 8/9/2024 2040 by Emily Acevedo RN  Supportive Measures:   active listening utilized   decision-making supported   goal-setting facilitated   self-care encouraged   self-responsibility promoted   verbalization of feelings encouraged  Medication Review/Management: medications reviewed  Goal: Blood Pressure in Desired Range  Outcome: Not Progressing  Intervention: Maintain Blood Pressure Management  Recent Flowsheet Documentation  Taken 8/10/2024 0400 by  Emily Acevedo, RN  Medication Review/Management: medications reviewed  Taken 8/10/2024 0000 by Emily Acevedo, RN  Medication Review/Management: medications reviewed  Taken 8/9/2024 2040 by Emily Acevedo, RN  Medication Review/Management: medications reviewed

## 2024-08-10 NOTE — PROGRESS NOTES
2100: Contacted hospitalist regarding increased anxiety per patient, pt. Takes klonopin daily, but no PRN orders low dose ativan?     Glenis DE LA TORRE placed order for 0.5 mg ativan PO.

## 2024-08-10 NOTE — PROGRESS NOTES
[facility]    Medicine Progress Note - Hospitalist Service    Date of Admission:  8/8/2024    Assessment & Plan   Lara Melendez is a 58 year old female with PMH of severe COPD on 2-3L O2 requiring previous tracheostomy, paroxysmal Afib on eliquis, HTN, HLD, hypothyroidism, anemia, MDD, anxiety, and large bowel obstruction requiring ostomy placement, who was admitted on 8/8/2024 with acute on chronic hypoxemic and hypercapnic respiratory failure 2/2 COPD exacerbation and COVID infection.      COVID-19 infection  Acute on chronic hypoxemic and hypercapnic respiratory failure  Severe COPD with acute exacerbation  She has severe COPD at baseline and follows closely with pulmonology.  She is on numerous inhalers at baseline.  She has been receiving prednisone to try to optimize her lung function prior to having an ostomy takedown and she was taking 10 mg daily although took 30 mg this morning.  For the last 24 to 48 hours she has had headache, fatigue, and progressively worsening shortness of breath with wheezing despite using nebulizer treatments.  In the ER she was in respiratory distress and received further nebs, magnesium, and 10 mg IV dexamethasone after testing positive for COVID-19 PCR.  Chest x-ray does not show any infiltrate.  She was placed on BiPAP for her COPD exacerbation and is feeling better.  She does not have a leukocytosis, fever, or elevated procalcitonin/lactic acid.  She has had COVID-19 before and is vaccinated.  She did require intubation in January for COPD and also required a tracheostomy a little over a year ago that has since been decannulated.  She does use 3 L oxygen at home and chronically has a mildly elevated CO2 on previous blood gases as she does now with pH 7.33, pCO2 51, pO2 66, bicarb 27.  Has been on BiPAP. Appears uncomfortable and has significant anxiety. Last ABG 7.36/47/110/26. Ok to trial HFNC. See below for anxiety management.  -IV dexamethasone 6 mg  "every 24 hours, if wheezing/COPD exacerbation not improving consider changing to high-dose IV Solu-Medrol  -IV Remdesevir  -DuoNebs 4 times daily, PTA 3% saline nebs twice daily, albuterol nebs as needed  -Continue BiPAP, ok to trial HFNC   - Wean as able to baseline of 2 to 3 L   -Acetaminophen as needed for headache and fevers  -Continue PTA azithromycin 250 mg every MWF    MDD  Anxiety  Having significant anxiety 2/2 dyspnea and fear of intubation. PTA on PRN clonazepam, which she usually takes ~1x/day.   - Resume PTA paroxetine   - PRN ativan 0.5mg q4h PRN     Paroxysmal A-fib  HTN  EKG shows NSR.  PTA on Eliquis 5 mg twice daily, metoprolol tartrate 37.5 mg twice daily, and amiodarone 200 mg daily.  -Resume PTA Eliquis, metoprolol, and diltiazem     HLD: Resume PTA atorvastatin.     Hypothyroidism: Resume PTA levothyroxine.     Chronic anemia: Hemoglobin at baseline of 10-11.     Colostomy: Previous large bowel obstruction requiring diverting colostomy.  As above there were plans to have a takedown surgery once lung status was optimized, however this was canceled.  -WOC RN consult for colostomy        Diet: NPO for Medical/Clinical Reasons Except for: Meds    DVT Prophylaxis: DOAC  Hines Catheter: Not present  Lines: None     Cardiac Monitoring: ACTIVE order. Indication: ICU  Code Status: Full Code      Clinically Significant Risk Factors                  # Hypertension: Noted on problem list           # Overweight: Estimated body mass index is 27.83 kg/m  as calculated from the following:    Height as of this encounter: 1.613 m (5' 3.5\").    Weight as of this encounter: 72.4 kg (159 lb 9.8 oz)., PRESENT ON ADMISSION     # COPD: noted on problem list              Disposition Plan     Medically Ready for Discharge: Anticipated in 2-4 Days             Eileen Lara DO  Hospitalist Service  [facility]  Securely message with [facility] (more info)  Text page via [facility] Paging/Directory "   ______________________________________________________________________    Interval History   No acute overnight events. Breathing feels about the same. Diffuse wheezing on exam. Per nursing, wheezing is improved from yesterday. Having a lot of anxiety due to dyspnea, fear of intubation, and claustrophobia.     Physical Exam   Vital Signs: Temp: 97.6  F (36.4  C) Temp src: Axillary BP: (!) 159/118 Pulse: 85   Resp: 22 SpO2: 97 % O2 Device: BiPAP/CPAP Oxygen Delivery: 2 LPM  Weight: 159 lbs 9.81 oz    Constitutional: Awake, alert, no distress, and cooperative  Cardiovascular: Regular rate and rhythm, normal S1 and S2, no S3 or S4, and no murmur noted  Respiratory: Tripoding on BiPAP but able to speak in short sentences, diffuse wheezing throughout  Gastrointestinal: Abdomen soft, non-tender, non-distended. BS normal. No masses, organomegaly     Medical Decision Making       45 MINUTES SPENT BY ME on the date of service doing chart review, history, exam, documentation & further activities per the note.      Data     I have personally reviewed the following data over the past 24 hrs:    9.4  \   11.4 (L)   / 248     141 103 17.4 /  109 (H)   4.3 25 0.72 \     ALT: 32 AST: 17 AP: 87 TBILI: 0.2   ALB: 3.9 TOT PROTEIN: 7.2 LIPASE: N/A

## 2024-08-10 NOTE — PROGRESS NOTES
Respiratory Therapy Note        Pt wore BiPAP overnight and was switched to HFNC at 11:00.  Settings started at 60 Lpm and 40%, currently down to 40 Lpm and 40%.  Her breathing is much better when her anxiety is under control.  She feels that the HFNC is more comfortable than BIPAP.    August 10, 2024 5:10 PM  Ron Hernandez, RT

## 2024-08-11 LAB
ANION GAP SERPL CALCULATED.3IONS-SCNC: 13 MMOL/L (ref 7–15)
BUN SERPL-MCNC: 20.6 MG/DL (ref 8–23)
CALCIUM SERPL-MCNC: 9.1 MG/DL (ref 8.8–10.4)
CHLORIDE SERPL-SCNC: 101 MMOL/L (ref 98–107)
CREAT SERPL-MCNC: 0.69 MG/DL (ref 0.51–0.95)
EGFRCR SERPLBLD CKD-EPI 2021: >90 ML/MIN/1.73M2
ERYTHROCYTE [DISTWIDTH] IN BLOOD BY AUTOMATED COUNT: 13.8 % (ref 10–15)
GLUCOSE SERPL-MCNC: 122 MG/DL (ref 70–99)
HCO3 SERPL-SCNC: 25 MMOL/L (ref 22–29)
HCT VFR BLD AUTO: 38 % (ref 35–47)
HGB BLD-MCNC: 12.1 G/DL (ref 11.7–15.7)
HOLD SPECIMEN: NORMAL
HOLD SPECIMEN: NORMAL
MCH RBC QN AUTO: 29.4 PG (ref 26.5–33)
MCHC RBC AUTO-ENTMCNC: 31.8 G/DL (ref 31.5–36.5)
MCV RBC AUTO: 93 FL (ref 78–100)
PLATELET # BLD AUTO: 249 10E3/UL (ref 150–450)
POTASSIUM SERPL-SCNC: 4.1 MMOL/L (ref 3.4–5.3)
RBC # BLD AUTO: 4.11 10E6/UL (ref 3.8–5.2)
SODIUM SERPL-SCNC: 139 MMOL/L (ref 135–145)
WBC # BLD AUTO: 16.9 10E3/UL (ref 4–11)

## 2024-08-11 PROCEDURE — 999N000157 HC STATISTIC RCP TIME EA 10 MIN

## 2024-08-11 PROCEDURE — 250N000011 HC RX IP 250 OP 636: Performed by: INTERNAL MEDICINE

## 2024-08-11 PROCEDURE — 94660 CPAP INITIATION&MGMT: CPT

## 2024-08-11 PROCEDURE — 36415 COLL VENOUS BLD VENIPUNCTURE: CPT | Performed by: STUDENT IN AN ORGANIZED HEALTH CARE EDUCATION/TRAINING PROGRAM

## 2024-08-11 PROCEDURE — 94640 AIRWAY INHALATION TREATMENT: CPT | Mod: 76

## 2024-08-11 PROCEDURE — 85027 COMPLETE CBC AUTOMATED: CPT | Performed by: STUDENT IN AN ORGANIZED HEALTH CARE EDUCATION/TRAINING PROGRAM

## 2024-08-11 PROCEDURE — 99232 SBSQ HOSP IP/OBS MODERATE 35: CPT | Performed by: STUDENT IN AN ORGANIZED HEALTH CARE EDUCATION/TRAINING PROGRAM

## 2024-08-11 PROCEDURE — 250N000013 HC RX MED GY IP 250 OP 250 PS 637: Performed by: INTERNAL MEDICINE

## 2024-08-11 PROCEDURE — 80048 BASIC METABOLIC PNL TOTAL CA: CPT | Performed by: STUDENT IN AN ORGANIZED HEALTH CARE EDUCATION/TRAINING PROGRAM

## 2024-08-11 PROCEDURE — 120N000004 HC R&B MS OVERFLOW

## 2024-08-11 PROCEDURE — 250N000009 HC RX 250: Performed by: INTERNAL MEDICINE

## 2024-08-11 PROCEDURE — 258N000003 HC RX IP 258 OP 636: Performed by: INTERNAL MEDICINE

## 2024-08-11 PROCEDURE — 250N000013 HC RX MED GY IP 250 OP 250 PS 637: Performed by: STUDENT IN AN ORGANIZED HEALTH CARE EDUCATION/TRAINING PROGRAM

## 2024-08-11 RX ADMIN — ALBUTEROL SULFATE 2.5 MG: 2.5 SOLUTION RESPIRATORY (INHALATION) at 00:03

## 2024-08-11 RX ADMIN — IPRATROPIUM BROMIDE AND ALBUTEROL SULFATE 3 ML: .5; 3 SOLUTION RESPIRATORY (INHALATION) at 20:43

## 2024-08-11 RX ADMIN — IPRATROPIUM BROMIDE AND ALBUTEROL SULFATE 3 ML: .5; 3 SOLUTION RESPIRATORY (INHALATION) at 08:15

## 2024-08-11 RX ADMIN — LEVOTHYROXINE SODIUM 112 MCG: 0.11 TABLET ORAL at 07:33

## 2024-08-11 RX ADMIN — IPRATROPIUM BROMIDE AND ALBUTEROL SULFATE 3 ML: .5; 3 SOLUTION RESPIRATORY (INHALATION) at 15:52

## 2024-08-11 RX ADMIN — ATORVASTATIN CALCIUM 20 MG: 20 TABLET, FILM COATED ORAL at 07:32

## 2024-08-11 RX ADMIN — METOPROLOL TARTRATE 37.5 MG: 25 TABLET, FILM COATED ORAL at 07:32

## 2024-08-11 RX ADMIN — IPRATROPIUM BROMIDE AND ALBUTEROL SULFATE 3 ML: .5; 3 SOLUTION RESPIRATORY (INHALATION) at 12:09

## 2024-08-11 RX ADMIN — FLUTICASONE FUROATE AND VILANTEROL 1 PUFF: 200; 25 POWDER RESPIRATORY (INHALATION) at 08:10

## 2024-08-11 RX ADMIN — ALBUTEROL SULFATE 2.5 MG: 2.5 SOLUTION RESPIRATORY (INHALATION) at 04:43

## 2024-08-11 RX ADMIN — LORAZEPAM 0.5 MG: 0.5 TABLET ORAL at 20:43

## 2024-08-11 RX ADMIN — DEXAMETHASONE SODIUM PHOSPHATE 6 MG: 10 INJECTION, SOLUTION INTRAMUSCULAR; INTRAVENOUS at 07:33

## 2024-08-11 RX ADMIN — SODIUM CHLORIDE, PRESERVATIVE FREE 50 ML: 5 INJECTION INTRAVENOUS at 03:21

## 2024-08-11 RX ADMIN — METOPROLOL TARTRATE 37.5 MG: 25 TABLET, FILM COATED ORAL at 20:42

## 2024-08-11 RX ADMIN — APIXABAN 5 MG: 5 TABLET, FILM COATED ORAL at 20:42

## 2024-08-11 RX ADMIN — SODIUM CHLORIDE SOLN NEBU 3% 4 ML: 3 NEBU SOLN at 20:43

## 2024-08-11 RX ADMIN — REMDESIVIR 100 MG: 100 INJECTION, POWDER, LYOPHILIZED, FOR SOLUTION INTRAVENOUS at 03:20

## 2024-08-11 RX ADMIN — DILTIAZEM HYDROCHLORIDE 240 MG: 240 CAPSULE, COATED, EXTENDED RELEASE ORAL at 07:33

## 2024-08-11 RX ADMIN — LORAZEPAM 0.5 MG: 0.5 TABLET ORAL at 08:38

## 2024-08-11 RX ADMIN — APIXABAN 5 MG: 5 TABLET, FILM COATED ORAL at 07:33

## 2024-08-11 RX ADMIN — SODIUM CHLORIDE SOLN NEBU 3% 4 ML: 3 NEBU SOLN at 08:15

## 2024-08-11 RX ADMIN — PAROXETINE HYDROCHLORIDE 20 MG: 20 TABLET, FILM COATED ORAL at 07:32

## 2024-08-11 RX ADMIN — LORAZEPAM 0.5 MG: 0.5 TABLET ORAL at 02:11

## 2024-08-11 RX ADMIN — LORAZEPAM 0.5 MG: 0.5 TABLET ORAL at 16:00

## 2024-08-11 ASSESSMENT — ACTIVITIES OF DAILY LIVING (ADL)
ADLS_ACUITY_SCORE: 26

## 2024-08-11 NOTE — PLAN OF CARE
"ICU End of Shift Summary.  For vital signs and complete assessments, please see documentation flowsheets.      Pertinent assessments:   Neuro: A/Ox4, afebrile. No c/o pain. Increased anxiety.  Cardiac: SR, HR 60-70's. BP HTN at times.   Resp: LS diminished, coarse with exp wheezes. Bipap at 30% most of day, HFNC for short periods of time.   GI: BS normoactive, ostomy intact, emptied 1x.   : Voided in BSC  Lines: 1 PIV    Major Shift Events:   Pt reports increased anxiety with her breathing, not being able to take a full breath when on HFNC. She reports she feels \"more coarse when it comes to her lungs\". Received ativan 2x this shift, effective with anxiety, but pt declining to be put on HFNC. When she is able to be encouraged, it is for typically less than 10 minutes.   Pt adjusts HFNC and bipap mask frequently.     Plan (Upcoming Events): Con't to encourage pt to wear HFNC when able, assessing anxiety and giving PRN when needed. Provide scheduled and PRN nebs.   Discharge/Transfer Needs: TBD     Bedside Shift Report Completed : Yes  Bedside Safety Check Completed: Yes    Goal Outcome Evaluation:      Plan of Care Reviewed With: patient    Overall Patient Progress: no changeOverall Patient Progress: no change    Outcome Evaluation: A/O x4, Pt on bipap most of shift, placed on HFNC for very short times during day. LS coarse, exp wheeze. PRN ativan x2. Pt reports being able to sleep      "

## 2024-08-11 NOTE — PLAN OF CARE
"Goal Outcome Evaluation:      Plan of Care Reviewed With: patient    Overall Patient Progress: improvingOverall Patient Progress: improving    Outcome Evaluation: Alert and oriented - PRN ativan given for anxiety with relief per patient. Tele SR. HTN. Decreased HFNC fio2 and liter flow. Pt tolerating regular diet. New PIV placed.      Problem: Adult Inpatient Plan of Care  Goal: Plan of Care Review  Description: The Plan of Care Review/Shift note should be completed every shift.  The Outcome Evaluation is a brief statement about your assessment that the patient is improving, declining, or no change.  This information will be displayed automatically on your shift  note.  Outcome: Progressing  Flowsheets (Taken 8/11/2024 0342)  Outcome Evaluation: Alert and oriented - PRN ativan given for anxiety with relief per patient. Tele SR. HTN. Decreased HFNC fio2 and liter flow. Pt tolerating regular diet. New PIV placed.  Plan of Care Reviewed With: patient  Overall Patient Progress: improving  Goal: Patient-Specific Goal (Individualized)  Description: You can add care plan individualizations to a care plan. Examples of Individualization might be:  \"Parent requests to be called daily at 9am for status\", \"I have a hard time hearing out of my right ear\", or \"Do not touch me to wake me up as it startles  me\".  Outcome: Progressing  Goal: Absence of Hospital-Acquired Illness or Injury  Outcome: Progressing  Intervention: Identify and Manage Fall Risk  Recent Flowsheet Documentation  Taken 8/11/2024 0130 by Emily Acevedo RN  Safety Promotion/Fall Prevention:   activity supervised   clutter free environment maintained   increased rounding and observation   increase visualization of patient   lighting adjusted   mobility aid in reach   patient and family education   nonskid shoes/slippers when out of bed   room near nurse's station   safety round/check completed   supervised activity   treat underlying cause  Taken 8/10/2024 2115 " by Foster, Emily, RN  Safety Promotion/Fall Prevention:   activity supervised   clutter free environment maintained   increased rounding and observation   increase visualization of patient   lighting adjusted   mobility aid in reach   patient and family education   nonskid shoes/slippers when out of bed   room near nurse's station   safety round/check completed   supervised activity   treat underlying cause  Intervention: Prevent Skin Injury  Recent Flowsheet Documentation  Taken 8/11/2024 0300 by Emily Acevedo RN  Body Position: position changed independently  Taken 8/11/2024 0130 by Emily Acevedo RN  Body Position: position changed independently  Skin Protection: adhesive use limited  Device Skin Pressure Protection:   adhesive use limited   tubing/devices free from skin contact  Taken 8/10/2024 2300 by Emily Acevedo RN  Body Position: position changed independently  Taken 8/10/2024 2115 by Emily Acevedo RN  Body Position: position changed independently  Skin Protection: adhesive use limited  Device Skin Pressure Protection:   adhesive use limited   tubing/devices free from skin contact  Intervention: Prevent and Manage VTE (Venous Thromboembolism) Risk  Recent Flowsheet Documentation  Taken 8/11/2024 0130 by Emily Acevedo RN  VTE Prevention/Management: (eliquis) SCDs off (sequential compression devices)  Taken 8/10/2024 2115 by Emily Acevedo RN  VTE Prevention/Management: (eliquis) SCDs off (sequential compression devices)  Intervention: Prevent Infection  Recent Flowsheet Documentation  Taken 8/11/2024 0130 by Emily Acevedo RN  Infection Prevention:   environmental surveillance performed   equipment surfaces disinfected   hand hygiene promoted   personal protective equipment utilized   rest/sleep promoted   single patient room provided  Taken 8/10/2024 2115 by Emily Acevedo RN  Infection Prevention:   environmental surveillance performed   equipment surfaces disinfected   hand hygiene promoted    personal protective equipment utilized   rest/sleep promoted   single patient room provided  Goal: Optimal Comfort and Wellbeing  Outcome: Progressing  Intervention: Monitor Pain and Promote Comfort  Recent Flowsheet Documentation  Taken 8/10/2024 2115 by Emily Acevedo RN  Pain Management Interventions: medication (see MAR)  Intervention: Provide Person-Centered Care  Recent Flowsheet Documentation  Taken 8/11/2024 0130 by Emily Acevedo RN  Trust Relationship/Rapport:   care explained   choices provided   emotional support provided   empathic listening provided   questions answered   questions encouraged   reassurance provided   thoughts/feelings acknowledged  Taken 8/10/2024 2115 by Emily Acevedo RN  Trust Relationship/Rapport:   care explained   choices provided   emotional support provided   empathic listening provided   questions answered   questions encouraged   reassurance provided   thoughts/feelings acknowledged  Goal: Readiness for Transition of Care  Outcome: Progressing     Problem: Skin Injury Risk Increased  Goal: Skin Health and Integrity  Outcome: Progressing  Intervention: Optimize Skin Protection  Recent Flowsheet Documentation  Taken 8/11/2024 0300 by Emily Acevedo RN  Head of Bed (HOB) Positioning: HOB at 60-90 degrees  Taken 8/11/2024 0130 by Emily Acevedo RN  Pressure Reduction Techniques:   frequent weight shift encouraged   rest period provided between sit times   heels elevated off bed  Pressure Reduction Devices: specialty bed utilized  Skin Protection: adhesive use limited  Activity Management: activity adjusted per tolerance  Head of Bed (HOB) Positioning: HOB at 60-90 degrees  Taken 8/10/2024 2300 by Emily Acevedo RN  Activity Management: up to bedside commode  Head of Bed (HOB) Positioning: HOB at 60-90 degrees  Taken 8/10/2024 2115 by Emily Acevedo RN  Pressure Reduction Techniques:   frequent weight shift encouraged   rest period provided between sit times   heels  elevated off bed  Pressure Reduction Devices: specialty bed utilized  Skin Protection: adhesive use limited  Activity Management:   activity adjusted per tolerance   sitting, edge of bed  Head of Bed (HOB) Positioning: HOB at 60-90 degrees  Intervention: Promote and Optimize Oral Intake  Recent Flowsheet Documentation  Taken 8/11/2024 0130 by Emily Acevedo RN  Oral Nutrition Promotion: rest periods promoted  Taken 8/10/2024 2115 by Emily Acevedo RN  Oral Nutrition Promotion: rest periods promoted     Problem: Infection  Goal: Absence of Infection Signs and Symptoms  Outcome: Progressing  Intervention: Prevent or Manage Infection  Recent Flowsheet Documentation  Taken 8/11/2024 0130 by Emily Acevedo RN  Infection Management: aseptic technique maintained  Isolation Precautions: special precautions maintained  Taken 8/10/2024 2115 by Emily Acevedo RN  Infection Management: aseptic technique maintained  Isolation Precautions: special precautions maintained     Problem: COPD (Chronic Obstructive Pulmonary Disease)  Goal: Optimal Chronic Illness Coping  Outcome: Progressing  Intervention: Support and Optimize Psychosocial Response  Recent Flowsheet Documentation  Taken 8/11/2024 0130 by Emily Acevedo RN  Supportive Measures:   active listening utilized   decision-making supported   goal-setting facilitated   self-care encouraged   self-responsibility promoted   verbalization of feelings encouraged  Taken 8/10/2024 2115 by Emily Acevedo RN  Supportive Measures:   active listening utilized   decision-making supported   goal-setting facilitated   self-care encouraged   self-responsibility promoted   verbalization of feelings encouraged  Goal: Optimal Level of Functional Garden  Outcome: Progressing  Intervention: Optimize Functional Ability  Recent Flowsheet Documentation  Taken 8/11/2024 0130 by Emily Acevedo RN  Self-Care Promotion: independence encouraged  Activity Management: activity adjusted per  tolerance  Environmental Support:   calm environment promoted   rest periods encouraged   environmental consistency promoted   personal routine supported  Taken 8/10/2024 2300 by Emily Acevedo RN  Activity Management: up to bedside commode  Taken 8/10/2024 2115 by Emily Acevedo RN  Self-Care Promotion: independence encouraged  Activity Management:   activity adjusted per tolerance   sitting, edge of bed  Environmental Support:   calm environment promoted   rest periods encouraged   environmental consistency promoted   personal routine supported  Goal: Absence of Infection Signs and Symptoms  Outcome: Progressing  Intervention: Prevent or Manage Infection  Recent Flowsheet Documentation  Taken 8/11/2024 0130 by Emily Acevedo RN  Infection Management: aseptic technique maintained  Isolation Precautions: special precautions maintained  Taken 8/10/2024 2115 by Emily Acevedo RN  Infection Management: aseptic technique maintained  Isolation Precautions: special precautions maintained  Goal: Improved Oral Intake  Outcome: Progressing  Intervention: Promote and Optimize Oral Intake  Recent Flowsheet Documentation  Taken 8/11/2024 0130 by Emily Acevedo RN  Oral Nutrition Promotion: rest periods promoted  Taken 8/10/2024 2115 by Emily Acevedo RN  Oral Nutrition Promotion: rest periods promoted  Goal: Effective Oxygenation and Ventilation  Outcome: Progressing  Intervention: Promote Airway Secretion Clearance  Recent Flowsheet Documentation  Taken 8/11/2024 0130 by Emily Acevedo RN  Breathing Techniques/Airway Clearance:   tripod positioning facilitated   deep/controlled cough encouraged  Cough And Deep Breathing: done independently per patient  Activity Management: activity adjusted per tolerance  Taken 8/10/2024 2300 by Emily Acevedo RN  Activity Management: up to bedside commode  Taken 8/10/2024 2115 by Emily Acevedo RN  Breathing Techniques/Airway Clearance:   tripod positioning facilitated    deep/controlled cough encouraged  Cough And Deep Breathing: done independently per patient  Activity Management:   activity adjusted per tolerance   sitting, edge of bed  Intervention: Optimize Oxygenation and Ventilation  Recent Flowsheet Documentation  Taken 8/11/2024 0300 by Emily Acevedo RN  Head of Bed (HOB) Positioning: HOB at 60-90 degrees  Taken 8/11/2024 0130 by Emliy Acevedo RN  Airway/Ventilation Management:   airway patency maintained   calming measures promoted   pulmonary hygiene promoted  Fluid/Electrolyte Management: oral rehydration therapy initiated  Head of Bed (HOB) Positioning: HOB at 60-90 degrees  Taken 8/10/2024 2300 by Emily Acevedo RN  Head of Bed (HOB) Positioning: HOB at 60-90 degrees  Taken 8/10/2024 2115 by Emily Acevedo RN  Airway/Ventilation Management:   airway patency maintained   calming measures promoted   pulmonary hygiene promoted  Fluid/Electrolyte Management: oral rehydration therapy initiated  Head of Bed (HOB) Positioning: HOB at 60-90 degrees     Problem: Comorbidity Management  Goal: Maintenance of COPD Symptom Control  Outcome: Progressing  Intervention: Maintain COPD Symptom Control  Recent Flowsheet Documentation  Taken 8/11/2024 0130 by Emily Acevedo RN  Supportive Measures:   active listening utilized   decision-making supported   goal-setting facilitated   self-care encouraged   self-responsibility promoted   verbalization of feelings encouraged  Medication Review/Management: medications reviewed  Taken 8/10/2024 2115 by Emily Acevedo RN  Supportive Measures:   active listening utilized   decision-making supported   goal-setting facilitated   self-care encouraged   self-responsibility promoted   verbalization of feelings encouraged  Medication Review/Management: medications reviewed  Goal: Blood Pressure in Desired Range  Outcome: Progressing  Intervention: Maintain Blood Pressure Management  Recent Flowsheet Documentation  Taken 8/11/2024 0130 by Curtis  Emily, EDINSON  Medication Review/Management: medications reviewed  Taken 8/10/2024 2115 by Emily Acevedo, RN  Medication Review/Management: medications reviewed

## 2024-08-11 NOTE — PROGRESS NOTES
Respiratory Therapy Note        Pt stayed on HFNC overnight but has spent much of the day on BiPAP.  She was unable to sleep with the HFNC.  SOB and anxiety are worse today.  Breath sounds remain diminished, coarse with an expiratory wheeze.  Alternating under the nose and regular BiPAP masks, her skin is in good condition without redness but she is complaining of tenderness on the bridge of her nose, may need to start using scuba masks.    August 11, 2024 5:17 PM  Ron Hernandez, RT

## 2024-08-11 NOTE — PROGRESS NOTES
[facility]    Medicine Progress Note - Hospitalist Service    Date of Admission:  8/8/2024    Assessment & Plan   Lara Melendez is a 58 year old female with PMH of severe COPD on 2-3L O2 requiring previous tracheostomy, paroxysmal Afib on eliquis, HTN, HLD, hypothyroidism, anemia, MDD, anxiety, and large bowel obstruction requiring ostomy placement, who was admitted on 8/8/2024 with acute on chronic hypoxemic and hypercapnic respiratory failure 2/2 COPD exacerbation and COVID infection.      COVID-19 infection  Acute on chronic hypoxemic and hypercapnic respiratory failure  Severe COPD with acute exacerbation  She has severe COPD at baseline and follows closely with pulmonology.  She is on numerous inhalers at baseline.  She has been receiving prednisone to try to optimize her lung function prior to having an ostomy takedown and she was taking 10 mg daily although took 30 mg this morning.  For the last 24 to 48 hours she has had headache, fatigue, and progressively worsening shortness of breath with wheezing despite using nebulizer treatments.  In the ER she was in respiratory distress and received further nebs, magnesium, and 10 mg IV dexamethasone after testing positive for COVID-19 PCR.  Chest x-ray does not show any infiltrate.  She was placed on BiPAP for her COPD exacerbation and is feeling better.  She does not have a leukocytosis, fever, or elevated procalcitonin/lactic acid.  She has had COVID-19 before and is vaccinated.  She did require intubation in January for COPD and also required a tracheostomy a little over a year ago that has since been decannulated.  She does use 3 L oxygen at home and chronically has a mildly elevated CO2 on previous blood gases as she does now with pH 7.33, pCO2 51, pO2 66, bicarb 27.  Significantly improved with BiPAP. Last ABG 7.36/47/110/26. Tolerated HFNC 8/10 but put back on BiPAP for sleep per patient preference.   -IV dexamethasone 6 mg every 24  "hours  -IV Remdesevir  -DuoNebs 4 times daily, PTA 3% saline nebs twice daily, albuterol nebs as needed  -HFNC while awake, wean as able   -BiPAP when sleeping, consider BiPAP on discharge  -Acetaminophen as needed for headache and fevers  -Continue PTA azithromycin 250 mg every MWF    MDD  Anxiety  Having significant anxiety 2/2 dyspnea. PTA on PRN clonazepam, which she usually takes ~1x/day.   - Resume PTA paroxetine   - PRN ativan 0.5mg q4h PRN     Paroxysmal A-fib  HTN  EKG shows NSR.    -Resume PTA Eliquis, metoprolol, and diltiazem     HLD: Resume PTA atorvastatin.     Hypothyroidism: Resume PTA levothyroxine.     Chronic anemia: Hemoglobin at baseline of 10-11.     Colostomy: Previous large bowel obstruction requiring diverting colostomy.  As above there were plans to have a takedown surgery once lung status was optimized, however this was canceled.  -WOC RN consult for colostomy        Diet: Regular Diet Adult    DVT Prophylaxis: DOAC  Hines Catheter: Not present  Lines: None     Cardiac Monitoring: ACTIVE order. Indication: ICU  Code Status: Full Code      Clinically Significant Risk Factors                  # Hypertension: Noted on problem list           # Overweight: Estimated body mass index is 28.45 kg/m  as calculated from the following:    Height as of this encounter: 1.613 m (5' 3.5\").    Weight as of this encounter: 74 kg (163 lb 2.3 oz)., PRESENT ON ADMISSION     # COPD: noted on problem list              Disposition Plan     Medically Ready for Discharge: Anticipated in 2-4 Days             Eileen Lara DO  Hospitalist Service  [facility]  Securely message with [employer] (more info)  Text page via [employer] Paging/Directory   ______________________________________________________________________    Interval History   No acute overnight events. Feeling better. Prefers BiPAP when she is sleeping as HFNC fell off. No acute concerns.     Physical Exam   Vital Signs: Temp: 98.8 "  F (37.1  C) Temp src: Oral BP: (!) 157/82 Pulse: 72   Resp: 20 SpO2: 96 % O2 Device: High Flow Nasal Cannula (HFNC) Oxygen Delivery: 25 LPM  Weight: 163 lbs 2.25 oz    Constitutional: Awake, alert, no distress, and cooperative  Cardiovascular: Regular rate and rhythm, normal S1 and S2, no S3 or S4, and no murmur noted  Respiratory: No increased work of breathing, mild wheezing but much improved  Gastrointestinal: Abdomen soft, non-tender, non-distended. BS normal. No masses, organomegaly     Medical Decision Making       45 MINUTES SPENT BY ME on the date of service doing chart review, history, exam, documentation & further activities per the note.      Data     I have personally reviewed the following data over the past 24 hrs:    16.9 (H)  \   12.1   / 249     139 101 20.6 /  122 (H)   4.1 25 0.69 \

## 2024-08-11 NOTE — PROGRESS NOTES
"Pt remained on HFNC 25L and 30-35% all night. Pt tolerating it well. Skin looks good and intact. Nebs given Q4 per pt request. BS remain diminished throughout with scattered wheezing at times. Continue to monitor pt's respiratory status.    BP (!) 157/97 (BP Location: Left arm)   Pulse 65   Temp 98.2  F (36.8  C) (Temporal)   Resp 23   Ht 1.613 m (5' 3.5\")   Wt 74 kg (163 lb 2.3 oz)   SpO2 95%   BMI 28.45 kg/m      Shanika Frank, RT    "

## 2024-08-12 LAB
ANION GAP SERPL CALCULATED.3IONS-SCNC: 10 MMOL/L (ref 7–15)
BUN SERPL-MCNC: 23.3 MG/DL (ref 8–23)
CALCIUM SERPL-MCNC: 8.6 MG/DL (ref 8.8–10.4)
CHLORIDE SERPL-SCNC: 102 MMOL/L (ref 98–107)
CREAT SERPL-MCNC: 0.67 MG/DL (ref 0.51–0.95)
EGFRCR SERPLBLD CKD-EPI 2021: >90 ML/MIN/1.73M2
ERYTHROCYTE [DISTWIDTH] IN BLOOD BY AUTOMATED COUNT: 13.6 % (ref 10–15)
GLUCOSE SERPL-MCNC: 140 MG/DL (ref 70–99)
HCO3 SERPL-SCNC: 29 MMOL/L (ref 22–29)
HCT VFR BLD AUTO: 33.7 % (ref 35–47)
HGB BLD-MCNC: 10.8 G/DL (ref 11.7–15.7)
MCH RBC QN AUTO: 29.2 PG (ref 26.5–33)
MCHC RBC AUTO-ENTMCNC: 32 G/DL (ref 31.5–36.5)
MCV RBC AUTO: 91 FL (ref 78–100)
PLATELET # BLD AUTO: 219 10E3/UL (ref 150–450)
POTASSIUM SERPL-SCNC: 3.7 MMOL/L (ref 3.4–5.3)
RBC # BLD AUTO: 3.7 10E6/UL (ref 3.8–5.2)
SODIUM SERPL-SCNC: 141 MMOL/L (ref 135–145)
WBC # BLD AUTO: 11.4 10E3/UL (ref 4–11)

## 2024-08-12 PROCEDURE — 250N000013 HC RX MED GY IP 250 OP 250 PS 637: Performed by: INTERNAL MEDICINE

## 2024-08-12 PROCEDURE — 258N000003 HC RX IP 258 OP 636: Performed by: INTERNAL MEDICINE

## 2024-08-12 PROCEDURE — 94640 AIRWAY INHALATION TREATMENT: CPT | Mod: 76

## 2024-08-12 PROCEDURE — 250N000011 HC RX IP 250 OP 636: Performed by: INTERNAL MEDICINE

## 2024-08-12 PROCEDURE — 250N000009 HC RX 250: Performed by: INTERNAL MEDICINE

## 2024-08-12 PROCEDURE — 999N000157 HC STATISTIC RCP TIME EA 10 MIN

## 2024-08-12 PROCEDURE — 250N000013 HC RX MED GY IP 250 OP 250 PS 637: Performed by: STUDENT IN AN ORGANIZED HEALTH CARE EDUCATION/TRAINING PROGRAM

## 2024-08-12 PROCEDURE — 99232 SBSQ HOSP IP/OBS MODERATE 35: CPT | Performed by: INTERNAL MEDICINE

## 2024-08-12 PROCEDURE — 80048 BASIC METABOLIC PNL TOTAL CA: CPT | Performed by: STUDENT IN AN ORGANIZED HEALTH CARE EDUCATION/TRAINING PROGRAM

## 2024-08-12 PROCEDURE — 85027 COMPLETE CBC AUTOMATED: CPT | Performed by: STUDENT IN AN ORGANIZED HEALTH CARE EDUCATION/TRAINING PROGRAM

## 2024-08-12 PROCEDURE — 999N000185 HC STATISTIC TRANSPORT TIME EA 15 MIN

## 2024-08-12 PROCEDURE — G0463 HOSPITAL OUTPT CLINIC VISIT: HCPCS

## 2024-08-12 PROCEDURE — 120N000001 HC R&B MED SURG/OB

## 2024-08-12 PROCEDURE — 94660 CPAP INITIATION&MGMT: CPT

## 2024-08-12 PROCEDURE — 36415 COLL VENOUS BLD VENIPUNCTURE: CPT | Performed by: STUDENT IN AN ORGANIZED HEALTH CARE EDUCATION/TRAINING PROGRAM

## 2024-08-12 RX ORDER — PREDNISONE 20 MG/1
20 TABLET ORAL DAILY
Status: DISCONTINUED | OUTPATIENT
Start: 2024-08-23 | End: 2024-08-14

## 2024-08-12 RX ORDER — PREDNISONE 20 MG/1
40 TABLET ORAL DAILY
Status: DISCONTINUED | OUTPATIENT
Start: 2024-08-13 | End: 2024-08-14

## 2024-08-12 RX ORDER — PREDNISONE 10 MG/1
10 TABLET ORAL DAILY
Status: DISCONTINUED | OUTPATIENT
Start: 2024-08-13 | End: 2024-08-12

## 2024-08-12 RX ORDER — LEVALBUTEROL INHALATION SOLUTION 0.63 MG/3ML
0.63 SOLUTION RESPIRATORY (INHALATION) 4 TIMES DAILY
Status: DISCONTINUED | OUTPATIENT
Start: 2024-08-12 | End: 2024-08-19 | Stop reason: HOSPADM

## 2024-08-12 RX ORDER — PREDNISONE 10 MG/1
10 TABLET ORAL DAILY
Status: DISCONTINUED | OUTPATIENT
Start: 2024-08-28 | End: 2024-08-14

## 2024-08-12 RX ORDER — HYDRALAZINE HYDROCHLORIDE 20 MG/ML
10 INJECTION INTRAMUSCULAR; INTRAVENOUS EVERY 6 HOURS PRN
Status: DISCONTINUED | OUTPATIENT
Start: 2024-08-12 | End: 2024-08-19 | Stop reason: HOSPADM

## 2024-08-12 RX ADMIN — DILTIAZEM HYDROCHLORIDE 240 MG: 240 CAPSULE, COATED, EXTENDED RELEASE ORAL at 09:19

## 2024-08-12 RX ADMIN — IPRATROPIUM BROMIDE AND ALBUTEROL SULFATE 3 ML: .5; 3 SOLUTION RESPIRATORY (INHALATION) at 12:38

## 2024-08-12 RX ADMIN — APIXABAN 5 MG: 5 TABLET, FILM COATED ORAL at 09:19

## 2024-08-12 RX ADMIN — DEXAMETHASONE SODIUM PHOSPHATE 6 MG: 10 INJECTION, SOLUTION INTRAMUSCULAR; INTRAVENOUS at 09:17

## 2024-08-12 RX ADMIN — ALBUTEROL SULFATE 2.5 MG: 2.5 SOLUTION RESPIRATORY (INHALATION) at 04:05

## 2024-08-12 RX ADMIN — LORAZEPAM 0.5 MG: 0.5 TABLET ORAL at 19:50

## 2024-08-12 RX ADMIN — IPRATROPIUM BROMIDE AND ALBUTEROL SULFATE 3 ML: .5; 3 SOLUTION RESPIRATORY (INHALATION) at 15:18

## 2024-08-12 RX ADMIN — LORAZEPAM 0.5 MG: 0.5 TABLET ORAL at 15:48

## 2024-08-12 RX ADMIN — AZITHROMYCIN DIHYDRATE 500 MG: 250 TABLET ORAL at 09:19

## 2024-08-12 RX ADMIN — PAROXETINE HYDROCHLORIDE 20 MG: 20 TABLET, FILM COATED ORAL at 09:19

## 2024-08-12 RX ADMIN — LEVALBUTEROL HYDROCHLORIDE 0.63 MG: 0.63 SOLUTION RESPIRATORY (INHALATION) at 21:12

## 2024-08-12 RX ADMIN — SODIUM CHLORIDE SOLN NEBU 3% 4 ML: 3 NEBU SOLN at 08:06

## 2024-08-12 RX ADMIN — REMDESIVIR 100 MG: 100 INJECTION, POWDER, LYOPHILIZED, FOR SOLUTION INTRAVENOUS at 04:04

## 2024-08-12 RX ADMIN — ACETAMINOPHEN 650 MG: 325 TABLET, FILM COATED ORAL at 16:43

## 2024-08-12 RX ADMIN — METOPROLOL TARTRATE 37.5 MG: 25 TABLET, FILM COATED ORAL at 21:47

## 2024-08-12 RX ADMIN — SODIUM CHLORIDE, PRESERVATIVE FREE 50 ML: 5 INJECTION INTRAVENOUS at 04:04

## 2024-08-12 RX ADMIN — IPRATROPIUM BROMIDE AND ALBUTEROL SULFATE 3 ML: .5; 3 SOLUTION RESPIRATORY (INHALATION) at 08:06

## 2024-08-12 RX ADMIN — SODIUM CHLORIDE SOLN NEBU 3% 4 ML: 3 NEBU SOLN at 21:15

## 2024-08-12 RX ADMIN — FLUTICASONE FUROATE AND VILANTEROL 1 PUFF: 200; 25 POWDER RESPIRATORY (INHALATION) at 09:09

## 2024-08-12 RX ADMIN — METOPROLOL TARTRATE 37.5 MG: 25 TABLET, FILM COATED ORAL at 09:18

## 2024-08-12 RX ADMIN — ATORVASTATIN CALCIUM 20 MG: 20 TABLET, FILM COATED ORAL at 09:19

## 2024-08-12 RX ADMIN — ALBUTEROL SULFATE 2.5 MG: 2.5 SOLUTION RESPIRATORY (INHALATION) at 00:28

## 2024-08-12 RX ADMIN — LEVOTHYROXINE SODIUM 112 MCG: 0.11 TABLET ORAL at 09:19

## 2024-08-12 RX ADMIN — APIXABAN 5 MG: 5 TABLET, FILM COATED ORAL at 21:47

## 2024-08-12 ASSESSMENT — ACTIVITIES OF DAILY LIVING (ADL)
ADLS_ACUITY_SCORE: 28
ADLS_ACUITY_SCORE: 28
ADLS_ACUITY_SCORE: 26
ADLS_ACUITY_SCORE: 28
ADLS_ACUITY_SCORE: 28
ADLS_ACUITY_SCORE: 26
ADLS_ACUITY_SCORE: 28
ADLS_ACUITY_SCORE: 28
ADLS_ACUITY_SCORE: 26
ADLS_ACUITY_SCORE: 26
ADLS_ACUITY_SCORE: 28
ADLS_ACUITY_SCORE: 26

## 2024-08-12 NOTE — PLAN OF CARE
"Goal Outcome Evaluation:      Plan of Care Reviewed With: patient    Overall Patient Progress: improvingOverall Patient Progress: improving    Outcome Evaluation: Alert and orietned. PRN ativan given. Pt. states feeling better being on bipap - writer suggested bipap overnight while resting and HFNC during the day. Pt. was agreeable to this. LS coarse. PRN nebs given. Continue plan of cares.      Problem: Adult Inpatient Plan of Care  Goal: Plan of Care Review  Description: The Plan of Care Review/Shift note should be completed every shift.  The Outcome Evaluation is a brief statement about your assessment that the patient is improving, declining, or no change.  This information will be displayed automatically on your shift  note.  Outcome: Progressing  Flowsheets (Taken 8/12/2024 0430)  Outcome Evaluation: Alert and orietned. PRN ativan given. Pt. states feeling better being on bipap - writer suggested bipap overnight while resting and HFNC during the day. Pt. was agreeable to this. LS coarse. PRN nebs given. Continue plan of cares.  Plan of Care Reviewed With: patient  Overall Patient Progress: improving  Goal: Patient-Specific Goal (Individualized)  Description: You can add care plan individualizations to a care plan. Examples of Individualization might be:  \"Parent requests to be called daily at 9am for status\", \"I have a hard time hearing out of my right ear\", or \"Do not touch me to wake me up as it startles  me\".  Outcome: Progressing  Goal: Absence of Hospital-Acquired Illness or Injury  Outcome: Progressing  Intervention: Identify and Manage Fall Risk  Recent Flowsheet Documentation  Taken 8/12/2024 0400 by Emily Acevedo RN  Safety Promotion/Fall Prevention:   activity supervised   clutter free environment maintained   increased rounding and observation   increase visualization of patient   lighting adjusted   mobility aid in reach   patient and family education   nonskid shoes/slippers when out of bed   " room near nurse's station   safety round/check completed   supervised activity   treat underlying cause  Taken 8/11/2024 2045 by Emily Acevedo RN  Safety Promotion/Fall Prevention:   activity supervised   clutter free environment maintained   increased rounding and observation   increase visualization of patient   lighting adjusted   mobility aid in reach   patient and family education   nonskid shoes/slippers when out of bed   room near nurse's station   safety round/check completed   supervised activity   treat underlying cause  Intervention: Prevent Skin Injury  Recent Flowsheet Documentation  Taken 8/12/2024 0400 by Emily Acevedo RN  Body Position: position changed independently  Skin Protection: adhesive use limited  Device Skin Pressure Protection:   adhesive use limited   tubing/devices free from skin contact  Taken 8/12/2024 0200 by Emily Acevedo RN  Body Position: position changed independently  Taken 8/12/2024 0030 by Emily Acevedo RN  Body Position: position changed independently  Taken 8/11/2024 2230 by Emily Acevedo RN  Body Position: position changed independently  Taken 8/11/2024 2045 by Emily Acevedo RN  Body Position: position changed independently  Skin Protection: adhesive use limited  Device Skin Pressure Protection:   adhesive use limited   tubing/devices free from skin contact  Intervention: Prevent and Manage VTE (Venous Thromboembolism) Risk  Recent Flowsheet Documentation  Taken 8/12/2024 0400 by Emily Acevedo RN  VTE Prevention/Management: (eliquis) SCDs off (sequential compression devices)  Taken 8/11/2024 2045 by Emily Acevedo RN  VTE Prevention/Management: (eliquis) SCDs off (sequential compression devices)  Intervention: Prevent Infection  Recent Flowsheet Documentation  Taken 8/12/2024 0400 by Emily Acevedo RN  Infection Prevention:   environmental surveillance performed   equipment surfaces disinfected   hand hygiene promoted   personal protective equipment  utilized   rest/sleep promoted   single patient room provided  Taken 8/11/2024 2045 by Emily Acevedo RN  Infection Prevention:   environmental surveillance performed   equipment surfaces disinfected   hand hygiene promoted   personal protective equipment utilized   rest/sleep promoted   single patient room provided  Goal: Optimal Comfort and Wellbeing  Outcome: Progressing  Intervention: Provide Person-Centered Care  Recent Flowsheet Documentation  Taken 8/12/2024 0400 by Emily Acevedo RN  Trust Relationship/Rapport:   care explained   choices provided   emotional support provided   empathic listening provided   questions answered   questions encouraged   reassurance provided   thoughts/feelings acknowledged  Taken 8/11/2024 2045 by Emily Acevedo RN  Trust Relationship/Rapport:   care explained   choices provided   emotional support provided   empathic listening provided   questions answered   questions encouraged   reassurance provided   thoughts/feelings acknowledged  Goal: Readiness for Transition of Care  Outcome: Progressing     Problem: Skin Injury Risk Increased  Goal: Skin Health and Integrity  Outcome: Progressing  Intervention: Plan: Nurse Driven Intervention: Moisture Management  Recent Flowsheet Documentation  Taken 8/12/2024 0400 by Emily Acevedo RN  Bathing/Skin Care: moisturizer applied  Taken 8/11/2024 2045 by Emily Acevedo RN  Bathing/Skin Care: moisturizer applied  Intervention: Optimize Skin Protection  Recent Flowsheet Documentation  Taken 8/12/2024 0400 by Emily Acevedo RN  Pressure Reduction Techniques:   frequent weight shift encouraged   rest period provided between sit times   heels elevated off bed  Pressure Reduction Devices: specialty bed utilized  Skin Protection: adhesive use limited  Activity Management: activity adjusted per tolerance  Head of Bed (HOB) Positioning: HOB at 30-45 degrees  Taken 8/12/2024 0200 by Emily Acevedo RN  Head of Bed (HOB) Positioning: HOB at  30-45 degrees  Taken 8/12/2024 0030 by Emily Acevedo RN  Head of Bed (HOB) Positioning: HOB at 30-45 degrees  Taken 8/11/2024 2230 by Emily Acevedo RN  Head of Bed (HOB) Positioning: HOB at 30-45 degrees  Taken 8/11/2024 2045 by Emily Acevedo RN  Pressure Reduction Techniques:   frequent weight shift encouraged   rest period provided between sit times   heels elevated off bed  Pressure Reduction Devices: specialty bed utilized  Skin Protection: adhesive use limited  Activity Management: activity adjusted per tolerance  Head of Bed (HOB) Positioning: HOB at 30-45 degrees  Intervention: Promote and Optimize Oral Intake  Recent Flowsheet Documentation  Taken 8/12/2024 0400 by Emily Acevedo RN  Oral Nutrition Promotion: rest periods promoted  Taken 8/11/2024 2045 by Emily Acevedo RN  Oral Nutrition Promotion: rest periods promoted     Problem: Infection  Goal: Absence of Infection Signs and Symptoms  Outcome: Progressing  Intervention: Prevent or Manage Infection  Recent Flowsheet Documentation  Taken 8/12/2024 0400 by Emily Acevedo RN  Infection Management: aseptic technique maintained  Isolation Precautions: special precautions maintained  Taken 8/11/2024 2045 by Emily Acevedo RN  Infection Management: aseptic technique maintained  Isolation Precautions: special precautions maintained     Problem: COPD (Chronic Obstructive Pulmonary Disease)  Goal: Optimal Chronic Illness Coping  Outcome: Progressing  Intervention: Support and Optimize Psychosocial Response  Recent Flowsheet Documentation  Taken 8/12/2024 0400 by Emily Acevedo RN  Supportive Measures:   active listening utilized   decision-making supported   goal-setting facilitated   self-care encouraged   self-responsibility promoted   verbalization of feelings encouraged   relaxation techniques promoted  Taken 8/11/2024 2045 by Emily Acevedo RN  Supportive Measures:   active listening utilized   decision-making supported   goal-setting  facilitated   self-care encouraged   self-responsibility promoted   verbalization of feelings encouraged   relaxation techniques promoted  Goal: Optimal Level of Functional Teller  Outcome: Progressing  Intervention: Optimize Functional Ability  Recent Flowsheet Documentation  Taken 8/12/2024 0400 by Emily Acevedo RN  Self-Care Promotion: independence encouraged  Activity Management: activity adjusted per tolerance  Environmental Support:   calm environment promoted   rest periods encouraged   environmental consistency promoted   personal routine supported  Taken 8/11/2024 2045 by Emily Acevedo RN  Self-Care Promotion: independence encouraged  Activity Management: activity adjusted per tolerance  Environmental Support:   calm environment promoted   rest periods encouraged   environmental consistency promoted   personal routine supported  Goal: Absence of Infection Signs and Symptoms  Outcome: Progressing  Intervention: Prevent or Manage Infection  Recent Flowsheet Documentation  Taken 8/12/2024 0400 by Emily Acevedo RN  Infection Management: aseptic technique maintained  Isolation Precautions: special precautions maintained  Taken 8/11/2024 2045 by Emily Acevedo RN  Infection Management: aseptic technique maintained  Isolation Precautions: special precautions maintained  Goal: Improved Oral Intake  Outcome: Progressing  Intervention: Promote and Optimize Oral Intake  Recent Flowsheet Documentation  Taken 8/12/2024 0400 by Emily Acevedo RN  Oral Nutrition Promotion: rest periods promoted  Taken 8/11/2024 2045 by Emily Acevedo RN  Oral Nutrition Promotion: rest periods promoted  Goal: Effective Oxygenation and Ventilation  Outcome: Progressing  Intervention: Promote Airway Secretion Clearance  Recent Flowsheet Documentation  Taken 8/12/2024 0400 by Emily Acevedo RN  Breathing Techniques/Airway Clearance:   tripod positioning facilitated   deep/controlled cough encouraged  Cough And Deep Breathing:  done independently per patient  Activity Management: activity adjusted per tolerance  Taken 8/11/2024 2045 by Emily Acevedo RN  Breathing Techniques/Airway Clearance:   tripod positioning facilitated   deep/controlled cough encouraged  Cough And Deep Breathing: done independently per patient  Activity Management: activity adjusted per tolerance  Intervention: Optimize Oxygenation and Ventilation  Recent Flowsheet Documentation  Taken 8/12/2024 0400 by Emily Acevedo RN  Airway/Ventilation Management:   airway patency maintained   calming measures promoted   pulmonary hygiene promoted  Fluid/Electrolyte Management: oral rehydration therapy initiated  Head of Bed (HOB) Positioning: HOB at 30-45 degrees  Taken 8/12/2024 0200 by Emily Acevedo RN  Head of Bed (HOB) Positioning: HOB at 30-45 degrees  Taken 8/12/2024 0030 by Emily Acevedo RN  Head of Bed (HOB) Positioning: HOB at 30-45 degrees  Taken 8/11/2024 2230 by Emily Acevedo RN  Head of Bed (HOB) Positioning: HOB at 30-45 degrees  Taken 8/11/2024 2045 by Emily Acevedo RN  Airway/Ventilation Management:   airway patency maintained   calming measures promoted   pulmonary hygiene promoted  Fluid/Electrolyte Management: oral rehydration therapy initiated  Head of Bed (HOB) Positioning: HOB at 30-45 degrees     Problem: Comorbidity Management  Goal: Maintenance of COPD Symptom Control  Outcome: Progressing  Intervention: Maintain COPD Symptom Control  Recent Flowsheet Documentation  Taken 8/12/2024 0400 by Emily Acevedo RN  Supportive Measures:   active listening utilized   decision-making supported   goal-setting facilitated   self-care encouraged   self-responsibility promoted   verbalization of feelings encouraged   relaxation techniques promoted  Medication Review/Management: medications reviewed  Taken 8/11/2024 2045 by Emily Acevedo RN  Supportive Measures:   active listening utilized   decision-making supported   goal-setting facilitated   self-care  encouraged   self-responsibility promoted   verbalization of feelings encouraged   relaxation techniques promoted  Medication Review/Management: medications reviewed  Goal: Blood Pressure in Desired Range  Outcome: Progressing  Intervention: Maintain Blood Pressure Management  Recent Flowsheet Documentation  Taken 8/12/2024 0400 by Emily Acevedo RN  Medication Review/Management: medications reviewed  Taken 8/11/2024 2045 by Emily Acevedo, RN  Medication Review/Management: medications reviewed

## 2024-08-12 NOTE — PROGRESS NOTES
[facility]  Hospitalist Progress Note  Brandt Gonzalez MD  08/12/2024    Assessment & Plan   Lara Melendez is a 58 year old female with PMH of severe COPD on 2-3L O2 requiring previous tracheostomy, paroxysmal Afib on eliquis, HTN, HLD, hypothyroidism, anemia, MDD, anxiety, and large bowel obstruction requiring ostomy placement, who was admitted on 8/8/2024 with acute on chronic hypoxemic and hypercapnic respiratory failure 2/2 COPD exacerbation and COVID infection.       COVID-19 infection  Acute on chronic hypoxemic and hypercapnic respiratory failure  Severe COPD with acute exacerbation  She has severe COPD at baseline and follows closely with pulmonology.  She is on numerous inhalers at baseline.  She has been receiving prednisone to try to optimize her lung function prior to having an ostomy takedown and she was taking 10 mg daily although took 30 mg this morning.  For the last 24 to 48 hours she has had headache, fatigue, and progressively worsening shortness of breath with wheezing despite using nebulizer treatments.  In the ER she was in respiratory distress and received further nebs, magnesium, and 10 mg IV dexamethasone after testing positive for COVID-19 PCR.  Chest x-ray does not show any infiltrate.  She was placed on BiPAP for her COPD exacerbation and is feeling better.  She does not have a leukocytosis, fever, or elevated procalcitonin/lactic acid.  She has had COVID-19 before and is vaccinated.  She did require intubation in January for COPD and also required a tracheostomy a little over a year ago that has since been decannulated.  She does use 3 L oxygen at home and chronically has a mildly elevated CO2 on previous blood gases as she does now with pH 7.33, pCO2 51, pO2 66, bicarb 27.  Significantly improved with BiPAP. Last ABG 7.36/47/110/26. Tolerated HFNC 8/10 but put back on BiPAP for sleep per patient preference.   -IV dexamethasone 6 mg every 24 hours, completed 5  "days  -IV Remdesivir day 4/5  -continue 3% saline nebs twice daily, albuterol nebs as needed  -HFNC while awake, wean as able  -BiPAP when sleeping, consider BiPAP on discharge  -Acetaminophen as needed for headache and fevers  -Continue PTA azithromycin 250 mg every MWF  change from DuoNebs 4 times daily to Xopenex and add Spiriva  Plan to start prednisone taper at 40 mg daily on 8/13     MDD  Anxiety  Having significant anxiety 2/2 dyspnea. PTA on PRN clonazepam, which she usually takes ~1x/day.   - Resume PTA paroxetine   - PRN ativan 0.5mg q4h PRN      Paroxysmal A-fib  HTN  EKG shows NSR.    -- Continue Eliquis, metoprolol, and diltiazem  Prn hydralazine for elevated BP     HLD:   -- continue PTA atorvastatin.     Hypothyroidism:   -- continue PTA levothyroxine.     Chronic anemia:   -- Hemoglobin at baseline of 10-11.     Colostomy: Previous large bowel obstruction requiring diverting colostomy.  As above there were plans to have a takedown surgery once lung status was optimized, however this was canceled.  -WOC RN consult for colostomy     Diet: Regular Diet Adult    DVT Prophylaxis: DOAC  Hines Catheter: Not present  Lines: None     Cardiac Monitoring: ACTIVE order. Indication: ICU  Code Status: Full Code          Clinically Significant Risk Factors                  # Hypertension: Noted on problem list            # Overweight: Estimated body mass index is 28.45 kg/m  as calculated from the following:    Height as of this encounter: 1.613 m (5' 3.5\").    Weight as of this encounter: 74 kg (163 lb 2.3 oz)., PRESENT ON ADMISSION     # COPD: noted on problem list        Disposition Plan     Medically Ready for Discharge:    Disposition:   -- to step down    Interval History   -- chart reviewed  -- discussed with RN  -- stable oxygen requirement    -Data reviewed today: I reviewed all new labs and imaging over the last 24 hours. I personally reviewed no images or EKG's today.    Physical Exam    , Blood pressure " "(!) 157/95, pulse 71, temperature 98.7  F (37.1  C), temperature source Oral, resp. rate (!) 56, height 1.613 m (5' 3.5\"), weight 74 kg (163 lb 2.3 oz), SpO2 98%.  Vitals:    08/09/24 0343 08/10/24 0400 08/11/24 0500   Weight: 72.3 kg (159 lb 6.3 oz) 72.4 kg (159 lb 9.8 oz) 74 kg (163 lb 2.3 oz)     Vital Signs with Ranges  Temp:  [96.7  F (35.9  C)-98.7  F (37.1  C)] 98.7  F (37.1  C)  Pulse:  [64-81] 71  Resp:  [20-56] 56  BP: (120-166)/() 157/95  FiO2 (%):  [30 %] 30 %  SpO2:  [95 %-98 %] 98 %  I/O's Last 24 hours  I/O last 3 completed shifts:  In: 200 [P.O.:200]  Out: 0     Constitutional: Awake, alert, cooperative, no apparent distress  Respiratory: diminished  Cardiovascular: Regular rate and rhythm, normal S1 and S2   GI: Normal bowel sounds, soft, non-distended, non-tender  Skin/Integumen: No rashes, no cyanosis, no edema  Other:      Medications   All medications were reviewed.  Current Facility-Administered Medications   Medication Dose Route Frequency Provider Last Rate Last Admin    No lozenges or gum should be given while patient on BIPAP/AVAPS/AVAPS AE   Does not apply Continuous PRN Albin Navarro MD        Patient is already receiving anticoagulation with heparin, enoxaparin (LOVENOX), warfarin (COUMADIN)  or other anticoagulant medication   Does not apply Continuous PRN Albin Navarro MD        Patient may continue current oral medications   Does not apply Continuous PRN Albin Navarro MD         Current Facility-Administered Medications   Medication Dose Route Frequency Provider Last Rate Last Admin    apixaban ANTICOAGULANT (ELIQUIS) tablet 5 mg  5 mg Oral BID Albin Navarro MD   5 mg at 08/12/24 0919    atorvastatin (LIPITOR) tablet 20 mg  20 mg Oral Daily Albin Navarro MD   20 mg at 08/12/24 0919    azithromycin (ZITHROMAX) tablet 500 mg  500 mg Oral Q Mon Wed Fri AM Albin Navarro MD   500 mg at 08/12/24 0919    diltiazem ER COATED BEADS " (CARDIZEM CD/CARTIA XT) 24 hr capsule 240 mg  240 mg Oral Daily Jane, Eileen, DO   240 mg at 08/12/24 0919    fluticasone-vilanterol (BREO ELLIPTA) 200-25 MCG/ACT inhaler 1 puff [PATIENT-SUPPLIED]  1 puff Inhalation Daily Jane Eileen, DO   1 puff at 08/12/24 0909    levalbuterol (XOPENEX) neb solution 0.63 mg  0.63 mg Nebulization 4x Daily Brandt Gonzalez MD        levothyroxine (SYNTHROID/LEVOTHROID) tablet 112 mcg  112 mcg Oral Daily Albin Navarro MD   112 mcg at 08/12/24 0919    metoprolol tartrate (LOPRESSOR) half-tab 37.5 mg  37.5 mg Oral BID Albin Navarro MD   37.5 mg at 08/12/24 0918    PARoxetine (PAXIL) tablet 20 mg  20 mg Oral Daily Albin Navarro MD   20 mg at 08/12/24 0919    predniSONE (DELTASONE) tablet 10 mg  10 mg Oral Daily Brandt Gonzalez MD        remdesivir 100 mg in sodium chloride 0.9 % 250 mL intermittent infusion  100 mg Intravenous Q24H Albin Navarro  mL/hr at 08/12/24 0404 100 mg at 08/12/24 0404    And    sodium chloride 0.9% BOLUS 50 mL  50 mL Intravenous Q24H Albin Navarro  mL/hr at 08/12/24 0404 50 mL at 08/12/24 0404    sodium chloride (NEBUSAL) 3 % neb solution 4 mL  4 mL Nebulization BID Albin Navarro MD   4 mL at 08/12/24 0806    umeclidinium (INCRUSE ELLIPTA) 62.5 MCG/ACT inhaler 1 puff  1 puff Inhalation Daily Brandt Gonazlez MD            Data   Recent Labs   Lab 08/12/24  0523 08/11/24  0540 08/10/24  2348 08/10/24  0753 08/10/24  0534   WBC 11.4* 16.9*  --   --  9.4   HGB 10.8* 12.1  --   --  11.4*   MCV 91 93  --   --  93    249  --   --  248    139  --   --  141   POTASSIUM 3.7 4.1  --   --  4.3   CHLORIDE 102 101  --   --  103   CO2 29 25  --   --  25   BUN 23.3* 20.6  --   --  17.4   CR 0.67 0.69  --   --  0.72   ANIONGAP 10 13  --   --  13   EDIN 8.6* 9.1  --   --  9.1   * 122* 122*   < > 115*   ALBUMIN  --   --   --   --  3.9   PROTTOTAL  --   --   --   --  7.2    BILITOTAL  --   --   --   --  0.2   ALKPHOS  --   --   --   --  87   ALT  --   --   --   --  32   AST  --   --   --   --  17    < > = values in this interval not displayed.       No results found for this or any previous visit (from the past 24 hour(s)).    Brandt Gonzalez MD  Text Page  (7am to 6pm)

## 2024-08-12 NOTE — PLAN OF CARE
Goal Outcome Evaluation:      Plan of Care Reviewed With: patient    Overall Patient Progress: improvingOverall Patient Progress: improving    Outcome Evaluation: Pt arrived from ICU around 1640 this afternoon. Ambulated from cart to bed with Ax1.  Denies dizziness.  Was on high flow briefly, but requested to be back on BIPAP as she still felt like she couldn't quite catch her breath after the transfer.  Reports improvement with BIPAP. Continuous pulse oximetry. Telemetry-NSR. Reports poor appetite. Fluids encouraged. Tylenol given x1 for headache.  Requesting Ativan prior to any nebulizer treatments.  Likely here a few more days.      Problem: Adult Inpatient Plan of Care  Goal: Plan of Care Review  Description: The Plan of Care Review/Shift note should be completed every shift.  The Outcome Evaluation is a brief statement about your assessment that the patient is improving, declining, or no change.  This information will be displayed automatically on your shift  note.  8/12/2024 1854 by Ghislaine Van RN  Outcome: Progressing  Flowsheets (Taken 8/12/2024 1854)  Outcome Evaluation: Pt arrived from ICU around 1640 this afternoon. Ambulated from cart to bed with Ax1.  Denies dizziness.  Was on high flow briefly, but requested to be back on BIPAP as she still felt like she couldn't quite catch her breath after the transfer.  Reports improvement with BIPAP. Continuous pulse oximetry. Telemetry-NSR. Reports poor appetite. Fluids encouraged. Tylenol given x1 for headache.  Requesting Ativan prior to any nebulizer treatments.  Likely here a few more days.  Plan of Care Reviewed With: patient  Overall Patient Progress: improving  8/12/2024 1854 by Ghislaine Van RN  Outcome: Progressing  Flowsheets (Taken 8/12/2024 1854)  Outcome Evaluation: Pt arrived from ICU around 1640 this afternoon. Ambulated from cart to bed with Ax1.  Denies dizziness.  Was on high flow briefly, but requested to be back on BIPAP as she  "still felt like she couldn't quite catch her breath after the transfer.  Reports improvement with BIPAP. Continuous pulse oximetry. Telemetry-NSR. Reports poor appetite. Fluids encouraged. Tylenol given x1 for headache.  Requesting Ativan prior to any nebulizer treatments.  Likely here a few more days.  Plan of Care Reviewed With: patient  Overall Patient Progress: improving  Goal: Patient-Specific Goal (Individualized)  Description: You can add care plan individualizations to a care plan. Examples of Individualization might be:  \"Parent requests to be called daily at 9am for status\", \"I have a hard time hearing out of my right ear\", or \"Do not touch me to wake me up as it startles  me\".  8/12/2024 1854 by Ghislaine Van RN  Outcome: Progressing  8/12/2024 1854 by Ghislaine Van RN  Outcome: Progressing  Goal: Absence of Hospital-Acquired Illness or Injury  8/12/2024 1854 by Ghislaine Van RN  Outcome: Progressing  8/12/2024 1854 by Ghislaine Van RN  Outcome: Progressing  Intervention: Identify and Manage Fall Risk  Recent Flowsheet Documentation  Taken 8/12/2024 1650 by Ghislaine Van RN  Safety Promotion/Fall Prevention:   activity supervised   assistive device/personal items within reach   clutter free environment maintained   increased rounding and observation   increase visualization of patient   lighting adjusted   nonskid shoes/slippers when out of bed   patient and family education   room organization consistent   safety round/check completed   supervised activity  Intervention: Prevent Skin Injury  Recent Flowsheet Documentation  Taken 8/12/2024 1650 by Ghislaine Van RN  Body Position:   position changed independently   sitting up in bed  Goal: Optimal Comfort and Wellbeing  8/12/2024 1854 by Ghislaine Van RN  Outcome: Progressing  8/12/2024 1854 by Ghislaine Van RN  Outcome: Progressing  Intervention: Monitor Pain and Promote Comfort  Recent Flowsheet " Documentation  Taken 8/12/2024 1643 by Ghislaine Van RN  Pain Management Interventions: medication (see MAR)  Goal: Readiness for Transition of Care  8/12/2024 1854 by Ghislaine Van RN  Outcome: Progressing  8/12/2024 1854 by Ghislaine Van RN  Outcome: Progressing     Problem: Skin Injury Risk Increased  Goal: Skin Health and Integrity  8/12/2024 1854 by Ghislaine Van RN  Outcome: Progressing  8/12/2024 1854 by Ghislaine Van RN  Outcome: Progressing  Intervention: Optimize Skin Protection  Recent Flowsheet Documentation  Taken 8/12/2024 1650 by Ghislaine Van RN  Activity Management: ambulated in room  Head of Bed (HOB) Positioning: HOB at 45 degrees     Problem: Infection  Goal: Absence of Infection Signs and Symptoms  8/12/2024 1854 by Ghislaine Van RN  Outcome: Progressing  8/12/2024 1854 by Ghislaine Van RN  Outcome: Progressing     Problem: COPD (Chronic Obstructive Pulmonary Disease)  Goal: Optimal Chronic Illness Coping  8/12/2024 1854 by Ghislaine Van RN  Outcome: Progressing  8/12/2024 1854 by Ghislaine Van RN  Outcome: Progressing  Goal: Optimal Level of Functional [place]  8/12/2024 1854 by Ghislaine Van RN  Outcome: Progressing  8/12/2024 1854 by Ghislaine Van RN  Outcome: Progressing  Intervention: Optimize Functional Ability  Recent Flowsheet Documentation  Taken 8/12/2024 1650 by Ghislaine Van RN  Activity Management: ambulated in room  Goal: Absence of Infection Signs and Symptoms  8/12/2024 1854 by Ghislaine Van RN  Outcome: Progressing  8/12/2024 1854 by Ghislaine Van RN  Outcome: Progressing  Goal: Improved Oral Intake  8/12/2024 1854 by Ghislaine Van RN  Outcome: Progressing  8/12/2024 1854 by Ghislaine Van RN  Outcome: Progressing  Goal: Effective Oxygenation and Ventilation  8/12/2024 1854 by Ghislaine Van, RN  Outcome: Progressing  8/12/2024 1854 by Ghislaine Van, RN  Outcome:  Progressing  Intervention: Promote Airway Secretion Clearance  Recent Flowsheet Documentation  Taken 8/12/2024 1650 by Ghislaine Van RN  Cough And Deep Breathing: done independently per patient  Activity Management: ambulated in room  Intervention: Optimize Oxygenation and Ventilation  Recent Flowsheet Documentation  Taken 8/12/2024 1650 by Ghislaine Van RN  Head of Bed (HOB) Positioning: HOB at 45 degrees     Problem: Comorbidity Management  Goal: Maintenance of COPD Symptom Control  8/12/2024 1854 by Ghislaine Van RN  Outcome: Progressing  8/12/2024 1854 by Ghislaine Van RN  Outcome: Progressing  Intervention: Maintain COPD Symptom Control  Recent Flowsheet Documentation  Taken 8/12/2024 1650 by Ghislaine Van RN  Medication Review/Management: medications reviewed  Goal: Blood Pressure in Desired Range  8/12/2024 1854 by Ghislaine Van RN  Outcome: Progressing  8/12/2024 1854 by Ghislaine Van RN  Outcome: Progressing  Intervention: Maintain Blood Pressure Management  Recent Flowsheet Documentation  Taken 8/12/2024 1650 by Ghislaine Van RN  Medication Review/Management: medications reviewed

## 2024-08-12 NOTE — CONSULTS
[facility]  WO Nurse Inpatient Assessment     Consulted for: Colostomy    Summary: Est colostomy with large prolapse and ineffective pouching plan. Pouching lasted through the weekend. Attempting to maintain MWF pouch changes. Patient has stomal hyperplasia, [facility] will attempt to perform silver nitrate treatments on follow up visit.     Patient History (according to provider note(s):      58 year old female with PMH including severe COPD on 2-3 L O2 requiring previous tracheostomy since did not cannulated and intubation earlier this year, paroxysmal A-fib on Eliquis, HTN, HLD, hypothyroidism, anemia, MDD, anxiety, and large bowel obstruction requiring ostomy placement who presents with severe shortness of breath.  For the last 1 to 2 days she has felt fatigued and had a headache.  Throughout the day today she became severely short of breath and wheezy.  She tried nebulizer treatments without improvement.  She has been taking prednisone 10 mg daily to try to optimize her lung status to get her ostomy takedown surgery, but today she took 30 mg due to her wheezing.  She denies any cough, myalgias, nausea, vomiting, diarrhea, chest pain.  She has had COVID-19 before and is vaccinated.  She has required intubation for COPD exacerbations the past including in January and she required a tracheostomy a little over 1 year ago.    Assessment:      Assessment of established loop Colostomy:  Diagnosis Pertinent to Stoma: Bowel Obstruction                                       Surgery Date: 7/12/23  Surgeon:[place]: [facility]  Pouching system in place on assessment today: Michael one piece, cut to fit, flat, and barrier ring, brava strips.   Pouch barrier status: Leaking at 3-9 o clock  Pouch last changed/wear time: 3 days  Reason for pouch change today:  leakage and routine pouch change  Effectiveness of current pouching/ supply plan: Recommend  continuing current plan  Change made with ostomy management today: Yes  Pouching system placed today: Michael one piece, flat, barrier ring, and ostomy strips   Supplies: at bedside, discussed with RN, and discussed with patient   Last photo: 8/12/24        Stoma location: Midline  Stoma size: 2 3/4 inches,   Stoma appearance: moist, about 6 inch prolapse- laceration from 2-9 o clock, large areas of hyperplasia on inferior aspect of stoma  Mucocutaneous junction:  intact  Peristomal complication(s): none   Output: soft  Output volume emptied during visit: 25ml  Abdominal assessment: Distended        Treatment Plan:     Midline Colostomy pouching plan:   Pouching system: ostomy supplies pouches: Michael Flat FECAL ([id number])   Accessories used: [facility] ostomy accessories: Brava Barrier Strip ([id number]) and Brava Ring  (patient provided   Frequency of pouch changes: Three times a week  WOC follow up plan:  MWF  Bedside RN interventions: Change pouch PRN if leaking using the supplies above, Empty pouch when 1/3 to 1/2 full, ensure to clean pouch outlet after emptying to prevent odor, Notify WOC for ongoing pouch leakage, Document stoma appearance and output volume, color, and consistency every shift, Encourage patient to empty pouch with assist, and patient can help with pouching      Orders: Written    RECOMMEND PRIMARY TEAM ORDER: None, at this time  Education provided: plan of care  Discussed plan of care with: Patient and Nurse  WOC nurse follow-up plan: MWF  Notify WOC if wound(s) deteriorate.  Nursing to notify the Provider(s) and re-consult the WOC Nurse if new skin concern.    DATA:     Current support surface: Standard  Low air loss (YONAS pump, Isolibrium, Pulsate)  Containment of urine/stool: Colostomy pouch  BMI: Body mass index is 28.45 kg/m .   Active diet order: Orders Placed This Encounter      Regular Diet Adult     Output: I/O last 3 completed shifts:  In: 320 [P.O.:320]  Out: 0      Labs:   Recent Labs    Lab 08/12/24  0523 08/11/24  0540 08/10/24  0534   ALBUMIN  --   --  3.9   HGB 10.8*   < > 11.4*   WBC 11.4*   < > 9.4    < > = values in this interval not displayed.     Pressure injury risk assessment:   Sensory Perception: 4-->no impairment  Moisture: 4-->rarely moist  Activity: 2-->chairfast  Mobility: 3-->slightly limited  Nutrition: 3-->adequate  Friction and Shear: 3-->no apparent problem  Lee Score: 19    Subha Ramirez RN CWOCN  Contact [facility] Nurse (Baljit)  Dept. Office Number: [phone]

## 2024-08-12 NOTE — PROGRESS NOTES
RT note:    Pt transported up to 6th floor on   BIPAP with no issues. BIPAP on and HFNC set up in standby. RT will continue to follow.

## 2024-08-13 PROCEDURE — 250N000009 HC RX 250: Performed by: INTERNAL MEDICINE

## 2024-08-13 PROCEDURE — 94640 AIRWAY INHALATION TREATMENT: CPT

## 2024-08-13 PROCEDURE — 258N000003 HC RX IP 258 OP 636: Performed by: INTERNAL MEDICINE

## 2024-08-13 PROCEDURE — 250N000013 HC RX MED GY IP 250 OP 250 PS 637: Performed by: STUDENT IN AN ORGANIZED HEALTH CARE EDUCATION/TRAINING PROGRAM

## 2024-08-13 PROCEDURE — 999N000157 HC STATISTIC RCP TIME EA 10 MIN

## 2024-08-13 PROCEDURE — 94660 CPAP INITIATION&MGMT: CPT

## 2024-08-13 PROCEDURE — 99233 SBSQ HOSP IP/OBS HIGH 50: CPT | Performed by: HOSPITALIST

## 2024-08-13 PROCEDURE — 250N000011 HC RX IP 250 OP 636: Performed by: INTERNAL MEDICINE

## 2024-08-13 PROCEDURE — 94640 AIRWAY INHALATION TREATMENT: CPT | Mod: 76

## 2024-08-13 PROCEDURE — 120N000001 HC R&B MED SURG/OB

## 2024-08-13 PROCEDURE — 250N000013 HC RX MED GY IP 250 OP 250 PS 637: Performed by: INTERNAL MEDICINE

## 2024-08-13 PROCEDURE — 250N000012 HC RX MED GY IP 250 OP 636 PS 637: Performed by: INTERNAL MEDICINE

## 2024-08-13 RX ADMIN — LORAZEPAM 0.5 MG: 0.5 TABLET ORAL at 08:49

## 2024-08-13 RX ADMIN — UMECLIDINIUM 1 PUFF: 62.5 AEROSOL, POWDER ORAL at 07:51

## 2024-08-13 RX ADMIN — APIXABAN 5 MG: 5 TABLET, FILM COATED ORAL at 08:34

## 2024-08-13 RX ADMIN — PREDNISONE 40 MG: 20 TABLET ORAL at 08:33

## 2024-08-13 RX ADMIN — LEVALBUTEROL HYDROCHLORIDE 0.63 MG: 0.63 SOLUTION RESPIRATORY (INHALATION) at 07:44

## 2024-08-13 RX ADMIN — METOPROLOL TARTRATE 37.5 MG: 25 TABLET, FILM COATED ORAL at 08:33

## 2024-08-13 RX ADMIN — SODIUM CHLORIDE, PRESERVATIVE FREE 50 ML: 5 INJECTION INTRAVENOUS at 06:34

## 2024-08-13 RX ADMIN — ATORVASTATIN CALCIUM 20 MG: 20 TABLET, FILM COATED ORAL at 08:33

## 2024-08-13 RX ADMIN — APIXABAN 5 MG: 5 TABLET, FILM COATED ORAL at 20:32

## 2024-08-13 RX ADMIN — LEVALBUTEROL HYDROCHLORIDE 0.63 MG: 0.63 SOLUTION RESPIRATORY (INHALATION) at 19:52

## 2024-08-13 RX ADMIN — LORAZEPAM 0.5 MG: 0.5 TABLET ORAL at 19:48

## 2024-08-13 RX ADMIN — REMDESIVIR 100 MG: 100 INJECTION, POWDER, LYOPHILIZED, FOR SOLUTION INTRAVENOUS at 06:33

## 2024-08-13 RX ADMIN — FLUTICASONE FUROATE AND VILANTEROL 1 PUFF: 200; 25 POWDER RESPIRATORY (INHALATION) at 09:30

## 2024-08-13 RX ADMIN — LEVOTHYROXINE SODIUM 112 MCG: 0.11 TABLET ORAL at 08:34

## 2024-08-13 RX ADMIN — LEVALBUTEROL HYDROCHLORIDE 0.63 MG: 0.63 SOLUTION RESPIRATORY (INHALATION) at 16:23

## 2024-08-13 RX ADMIN — LORAZEPAM 0.5 MG: 0.5 TABLET ORAL at 15:24

## 2024-08-13 RX ADMIN — SODIUM CHLORIDE SOLN NEBU 3% 4 ML: 3 NEBU SOLN at 07:44

## 2024-08-13 RX ADMIN — SODIUM CHLORIDE SOLN NEBU 3% 4 ML: 3 NEBU SOLN at 19:54

## 2024-08-13 RX ADMIN — PAROXETINE HYDROCHLORIDE 20 MG: 20 TABLET, FILM COATED ORAL at 08:34

## 2024-08-13 RX ADMIN — DILTIAZEM HYDROCHLORIDE 240 MG: 240 CAPSULE, COATED, EXTENDED RELEASE ORAL at 08:33

## 2024-08-13 RX ADMIN — METOPROLOL TARTRATE 37.5 MG: 25 TABLET, FILM COATED ORAL at 20:32

## 2024-08-13 RX ADMIN — LEVALBUTEROL HYDROCHLORIDE 0.63 MG: 0.63 SOLUTION RESPIRATORY (INHALATION) at 11:34

## 2024-08-13 RX ADMIN — ACETAMINOPHEN 650 MG: 325 TABLET, FILM COATED ORAL at 14:41

## 2024-08-13 ASSESSMENT — ACTIVITIES OF DAILY LIVING (ADL)
ADLS_ACUITY_SCORE: 26
ADLS_ACUITY_SCORE: 28
ADLS_ACUITY_SCORE: 28
ADLS_ACUITY_SCORE: 26
ADLS_ACUITY_SCORE: 26
ADLS_ACUITY_SCORE: 28
ADLS_ACUITY_SCORE: 26
ADLS_ACUITY_SCORE: 28
ADLS_ACUITY_SCORE: 26
ADLS_ACUITY_SCORE: 26
ADLS_ACUITY_SCORE: 28
ADLS_ACUITY_SCORE: 26
ADLS_ACUITY_SCORE: 28
ADLS_ACUITY_SCORE: 26
ADLS_ACUITY_SCORE: 28
ADLS_ACUITY_SCORE: 26

## 2024-08-13 NOTE — PLAN OF CARE
"Goal Outcome Evaluation:      Plan of Care Reviewed With: patient    Overall Patient Progress: no change    Outcome Evaluation: SBA to commode; ostomy scant output; voiding; elevated BP-decreased after marybeth oral med; BIPAP on after pt ate dinner; remote tele-NSR; d/c TBD      Problem: Adult Inpatient Plan of Care  Goal: Plan of Care Review  Description: The Plan of Care Review/Shift note should be completed every shift.  The Outcome Evaluation is a brief statement about your assessment that the patient is improving, declining, or no change.  This information will be displayed automatically on your shift  note.  Outcome: Progressing  Flowsheets (Taken 8/13/2024 0203)  Outcome Evaluation:   SBA to commode   ostomy scant output   voiding   elevated BP-decreased after marybeth oral med   BIPAP on after pt ate dinner   remote tele-NSR   d/c TBD  Plan of Care Reviewed With: patient  Overall Patient Progress: no change  Goal: Patient-Specific Goal (Individualized)  Description: You can add care plan individualizations to a care plan. Examples of Individualization might be:  \"Parent requests to be called daily at 9am for status\", \"I have a hard time hearing out of my right ear\", or \"Do not touch me to wake me up as it startles  me\".  Outcome: Progressing  Goal: Absence of Hospital-Acquired Illness or Injury  Outcome: Progressing  Intervention: Identify and Manage Fall Risk  Recent Flowsheet Documentation  Taken 8/12/2024 2147 by Leighton Keene, RN  Safety Promotion/Fall Prevention:   activity supervised   assistive device/personal items within reach   clutter free environment maintained   increased rounding and observation   increase visualization of patient   lighting adjusted   nonskid shoes/slippers when out of bed   patient and family education   room organization consistent   safety round/check completed   supervised activity  Intervention: Prevent Skin Injury  Recent Flowsheet Documentation  Taken 8/12/2024 2147 by Jassi, " EDINSON Manzanares  Body Position:   position changed independently   sitting up in bed  Intervention: Prevent Infection  Recent Flowsheet Documentation  Taken 8/12/2024 2147 by Leighton Keene RN  Infection Prevention:   rest/sleep promoted   cohorting utilized   single patient room provided   visitors restricted/screened   personal protective equipment utilized  Goal: Optimal Comfort and Wellbeing  Outcome: Progressing  Intervention: Monitor Pain and Promote Comfort  Recent Flowsheet Documentation  Taken 8/12/2024 2147 by Leighton Keene RN  Pain Management Interventions:   declines   rest  Goal: Readiness for Transition of Care  Outcome: Progressing     Problem: Skin Injury Risk Increased  Goal: Skin Health and Integrity  Outcome: Progressing  Intervention: Plan: Nurse Driven Intervention: Moisture Management  Recent Flowsheet Documentation  Taken 8/12/2024 2147 by Leighton Keene RN  Bathing/Skin Care: incontinence care  Taken 8/12/2024 1959 by Leighton Keene RN  Moisture Interventions:   Encourage regular toileting   No brief in bed  Bathing/Skin Care: incontinence care  Intervention: Optimize Skin Protection  Recent Flowsheet Documentation  Taken 8/12/2024 2147 by Leighton Keene RN  Activity Management:   up to bedside commode   back to bed  Head of Bed (HOB) Positioning: HOB at 30-45 degrees     Problem: Infection  Goal: Absence of Infection Signs and Symptoms  Outcome: Progressing  Intervention: Prevent or Manage Infection  Recent Flowsheet Documentation  Taken 8/12/2024 2147 by Leighton Keene RN  Infection Management: aseptic technique maintained  Isolation Precautions: special precautions maintained     Problem: COPD (Chronic Obstructive Pulmonary Disease)  Goal: Optimal Chronic Illness Coping  Outcome: Progressing  Goal: Optimal Level of Functional [place]  Outcome: Progressing  Intervention: Optimize Functional Ability  Recent Flowsheet Documentation  Taken 8/12/2024 2147 by Leighton Keene RN  Activity  Management:   up to bedside commode   back to bed  Goal: Absence of Infection Signs and Symptoms  Outcome: Progressing  Intervention: Prevent or Manage Infection  Recent Flowsheet Documentation  Taken 8/12/2024 2147 by Leighton Keene RN  Infection Management: aseptic technique maintained  Isolation Precautions: special precautions maintained  Goal: Improved Oral Intake  Outcome: Progressing  Goal: Effective Oxygenation and Ventilation  Outcome: Progressing  Intervention: Promote Airway Secretion Clearance  Recent Flowsheet Documentation  Taken 8/12/2024 2147 by Leighton Keene RN  Breathing Techniques/Airway Clearance:   deep/controlled cough encouraged   tripod positioning facilitated  Cough And Deep Breathing: done independently per patient  Activity Management:   up to bedside commode   back to bed  Intervention: Optimize Oxygenation and Ventilation  Recent Flowsheet Documentation  Taken 8/12/2024 2147 by Leighton Keene RN  Airway/Ventilation Management:   airway patency maintained   calming measures promoted   pulmonary hygiene promoted  Head of Bed (HOB) Positioning: HOB at 30-45 degrees     Problem: Comorbidity Management  Goal: Maintenance of COPD Symptom Control  Outcome: Progressing  Intervention: Maintain COPD Symptom Control  Recent Flowsheet Documentation  Taken 8/12/2024 2147 by Leighton Keene RN  Medication Review/Management: medications reviewed  Goal: Blood Pressure in Desired Range  Outcome: Progressing  Intervention: Maintain Blood Pressure Management  Recent Flowsheet Documentation  Taken 8/12/2024 2147 by Leighton Keene RN  Medication Review/Management: medications reviewed

## 2024-08-13 NOTE — PROGRESS NOTES
[facility]    Medicine Progress Note - Hospitalist Service    Date of Admission:  8/8/2024    Assessment & Plan   Lara Melendez is a 58 year old female with PMH of severe COPD on 2-3L O2 requiring previous tracheostomy, paroxysmal Afib on eliquis, HTN, HLD, hypothyroidism, anemia, MDD, anxiety, and large bowel obstruction requiring ostomy placement, who was admitted on 8/8/2024 with acute on chronic hypoxemic and hypercapnic respiratory failure 2/2 COPD exacerbation and COVID infection.       COVID-19 infection  Acute on chronic hypoxemic and hypercapnic respiratory failure  Severe COPD with acute exacerbation  She has severe COPD at baseline and follows closely with pulmonology.  She is on numerous inhalers at baseline.  She has been receiving prednisone to try to optimize her lung function prior to having an ostomy takedown and she was taking 10 mg daily although took 30 mg this morning.  For the last 24 to 48 hours she has had headache, fatigue, and progressively worsening shortness of breath with wheezing despite using nebulizer treatments.  In the ER she was in respiratory distress and received further nebs, magnesium, and 10 mg IV dexamethasone after testing positive for COVID-19 PCR.  Chest x-ray does not show any infiltrate.  She was placed on BiPAP for her COPD exacerbation and is feeling better.  She does not have a leukocytosis, fever, or elevated procalcitonin/lactic acid.  She has had COVID-19 before and is vaccinated.  She did require intubation in January for COPD and also required a tracheostomy a little over a year ago that has since been decannulated.  She does use 3 L oxygen at home and chronically has a mildly elevated CO2 on previous blood gases as she does now with pH 7.33, pCO2 51, pO2 66, bicarb 27.  Significantly improved with BiPAP. Last ABG 7.36/47/110/26. Tolerated HFNC 8/10 but put back on BiPAP for sleep per patient preference.   -IV dexamethasone 6 mg every 24  "hours, completed 5 days  -IV Remdesivir day 4/5  -continue 3% saline nebs twice daily, albuterol nebs as needed  -HFNC while awake, wean as able  -BiPAP when sleeping, consider BiPAP on discharge  -Acetaminophen as needed for headache and fevers  -Continue PTA azithromycin 250 mg every MWF  change from DuoNebs 4 times daily to Xopenex and add Spiriva  Plan to start prednisone taper at 40 mg daily on 8/13     MDD  Anxiety  Having significant anxiety 2/2 dyspnea. PTA on PRN clonazepam, which she usually takes ~1x/day.   - Resume PTA paroxetine   - PRN ativan 0.5mg q4h PRN      Paroxysmal A-fib  HTN  EKG shows NSR.    -- Continue Eliquis, metoprolol, and diltiazem  Prn hydralazine for elevated BP     HLD:   -- continue PTA atorvastatin.     Hypothyroidism:   -- continue PTA levothyroxine.     Chronic anemia:   -- Hemoglobin at baseline of 10-11.     Colostomy: Previous large bowel obstruction requiring diverting colostomy.  As above there were plans to have a takedown surgery once lung status was optimized, however this was canceled.  -WOC RN consult for colostomy        Diet: Regular Diet Adult    DVT Prophylaxis: DOAC  Hines Catheter: Not present  Lines: None     Cardiac Monitoring: ACTIVE order. Indication: ICU  Code Status: Full Code      Clinically Significant Risk Factors                  # Hypertension: Noted on problem list           # Overweight: Estimated body mass index is 25.91 kg/m  as calculated from the following:    Height as of this encounter: 1.613 m (5' 3.5\").    Weight as of this encounter: 67.4 kg (148 lb 9.4 oz).      # COPD: noted on problem list              Disposition Plan     Medically Ready for Discharge: Anticipated in 2-4 Days             Geovani Baker MD  Hospitalist Service  [facility]  Securely message with [employer] (more info)  Text page via [employer] Paging/Directory   ______________________________________________________________________    Interval History   She " reported having improvement but still significant short of breath, dropping oxygen saturation when walks to the bathroom, needs BiPAP soup for, not good oral intake, frequent dry cough.    Physical Exam   Vital Signs: Temp: 97.1  F (36.2  C) Temp src: Temporal BP: (!) 159/96 Pulse: 65   Resp: 22 SpO2: 95 % O2 Device: BiPAP/CPAP Oxygen Delivery: 3 LPM  Weight: 148 lbs 9.44 oz    GEN:  Alert, oriented x 3, appears comfortable, NAD.  HEENT:  Normocephalic/atraumatic, no scleral icterus, no nasal discharge, mouth moist.  CV:  Regular rate and rhythm, no murmur or JVD.  S1 + S2 noted, no S3 or S4.  LUNGS: Coarse sounds, diminished to auscultation bilaterally with rhonchi/wheezing/crackles.  Symmetric chest rise on inhalation noted.  ABD:  Active bowel sounds, soft, non-tender/non-distended.  No rebound/guarding/rigidity.  EXT:  No edema or cyanosis.  No joint synovitis noted.  SKIN:  Dry to touch, no exanthems noted in the visualized areas.         Medical Decision Making       45 MINUTES SPENT BY ME on the date of service doing chart review, history, exam, documentation & further activities per the note.      Data         Imaging results reviewed over the past 24 hrs:   No results found for this or any previous visit (from the past 24 hour(s)).

## 2024-08-13 NOTE — PROGRESS NOTES
"Respiratory Care Note:    A BiPAP of  16/8 @ 30% was applied to the pt via the mask for an increase in WOB and/or SOB.  The bridge of the nose looks good and remains intact. Pt is tolerating it well. Xopenex and Sodium Chloride given in line. Bs diminished. Will continue to monitor and assess the pt's current respiratory status and needs.    BP (!) 178/94 (BP Location: Left arm)   Pulse 60   Temp 97.9  F (36.6  C) (Temporal)   Resp 21   Ht 1.613 m (5' 3.5\")   Wt 74 kg (163 lb 2.3 oz)   SpO2 99%   BMI 28.45 kg/m      Renny Gamboa, RT on 8/13/2024 at 4:49 AM      "

## 2024-08-13 NOTE — PROGRESS NOTES
"Patient has been on BiPAP for the most of this shift on settings of 16/8 @ 24%. Pt insisted wanting to remain on BiPAP and without it will feel anxiety. Neb treatments given inline. The bridge of the nose looks good and remains intact. Pt is tolerating it well. Will continue to monitor and assess the pt's current respiratory status and needs.    BP (!) 159/96 (BP Location: Left arm)   Pulse 65   Temp 97.1  F (36.2  C) (Temporal)   Resp 22   Ht 1.613 m (5' 3.5\")   Wt 67.4 kg (148 lb 9.4 oz)   SpO2 97%   BMI 25.91 kg/m        Ulices Escalante, RT    "

## 2024-08-13 NOTE — PLAN OF CARE
"Goal Outcome Evaluation:      Plan of Care Reviewed With: patient    Overall Patient Progress: no changeOverall Patient Progress: no change       [id number]  Calm and cooperative. PRN ativan [id number] for anxiety prior to scheduled nebulization. Pt becomes agitated at times. Pt agitated how PRN ativan is only available q4hrs, and not before each neb treatment.   No change  Pt has emptied her own colostomy stating that \"we dont need to measure this\" Rn attempted to inform pt that it is important that we measure outputs.   Special precautions maintained.        Problem: Adult Inpatient Plan of Care  Goal: Plan of Care Review  Description: The Plan of Care Review/Shift note should be completed every shift.  The Outcome Evaluation is a brief statement about your assessment that the patient is improving, declining, or no change.  This information will be displayed automatically on your shift  note.  Outcome: Progressing  Flowsheets (Taken 8/13/2024 1121)  Plan of Care Reviewed With: patient  Overall Patient Progress: no change  Goal: Patient-Specific Goal (Individualized)  Description: You can add care plan individualizations to a care plan. Examples of Individualization might be:  \"Parent requests to be called daily at 9am for status\", \"I have a hard time hearing out of my right ear\", or \"Do not touch me to wake me up as it startles  me\".  Outcome: Progressing  Goal: Absence of Hospital-Acquired Illness or Injury  Outcome: Progressing  Goal: Optimal Comfort and Wellbeing  Outcome: Progressing  Goal: Readiness for Transition of Care  Outcome: Progressing     "

## 2024-08-14 ENCOUNTER — APPOINTMENT (OUTPATIENT)
Dept: CT IMAGING | Facility: CLINIC | Age: 59
End: 2024-08-14
Attending: COLON & RECTAL SURGERY
Payer: COMMERCIAL

## 2024-08-14 LAB
ANION GAP SERPL CALCULATED.3IONS-SCNC: 10 MMOL/L (ref 7–15)
BASE EXCESS BLDV CALC-SCNC: 4.5 MMOL/L (ref -3–3)
BASOPHILS # BLD AUTO: 0 10E3/UL (ref 0–0.2)
BASOPHILS NFR BLD AUTO: 0 %
BUN SERPL-MCNC: 21.8 MG/DL (ref 8–23)
CALCIUM SERPL-MCNC: 8.7 MG/DL (ref 8.8–10.4)
CHLORIDE SERPL-SCNC: 102 MMOL/L (ref 98–107)
CREAT SERPL-MCNC: 0.68 MG/DL (ref 0.51–0.95)
CRP SERPL-MCNC: 39.77 MG/L
EGFRCR SERPLBLD CKD-EPI 2021: >90 ML/MIN/1.73M2
EOSINOPHIL # BLD AUTO: 0 10E3/UL (ref 0–0.7)
EOSINOPHIL NFR BLD AUTO: 0 %
ERYTHROCYTE [DISTWIDTH] IN BLOOD BY AUTOMATED COUNT: 13.6 % (ref 10–15)
GLUCOSE BLDC GLUCOMTR-MCNC: 140 MG/DL (ref 70–99)
GLUCOSE SERPL-MCNC: 102 MG/DL (ref 70–99)
HCO3 BLDV-SCNC: 30 MMOL/L (ref 21–28)
HCO3 SERPL-SCNC: 28 MMOL/L (ref 22–29)
HCT VFR BLD AUTO: 34.3 % (ref 35–47)
HGB BLD-MCNC: 10.7 G/DL (ref 11.7–15.7)
IMM GRANULOCYTES # BLD: 0.1 10E3/UL
IMM GRANULOCYTES NFR BLD: 2 %
LYMPHOCYTES # BLD AUTO: 1.3 10E3/UL (ref 0.8–5.3)
LYMPHOCYTES NFR BLD AUTO: 15 %
MCH RBC QN AUTO: 28.8 PG (ref 26.5–33)
MCHC RBC AUTO-ENTMCNC: 31.2 G/DL (ref 31.5–36.5)
MCV RBC AUTO: 93 FL (ref 78–100)
MONOCYTES # BLD AUTO: 0.6 10E3/UL (ref 0–1.3)
MONOCYTES NFR BLD AUTO: 7 %
NEUTROPHILS # BLD AUTO: 6.6 10E3/UL (ref 1.6–8.3)
NEUTROPHILS NFR BLD AUTO: 76 %
NRBC # BLD AUTO: 0 10E3/UL
NRBC BLD AUTO-RTO: 0 /100
O2/TOTAL GAS SETTING VFR VENT: 24 %
OXYHGB MFR BLDV: 93 % (ref 70–75)
PCO2 BLDV: 46 MM HG (ref 40–50)
PH BLDV: 7.42 [PH] (ref 7.32–7.43)
PLATELET # BLD AUTO: 203 10E3/UL (ref 150–450)
PO2 BLDV: 72 MM HG (ref 25–47)
POTASSIUM SERPL-SCNC: 3.6 MMOL/L (ref 3.4–5.3)
RBC # BLD AUTO: 3.71 10E6/UL (ref 3.8–5.2)
SAO2 % BLDV: 94.4 % (ref 70–75)
SODIUM SERPL-SCNC: 140 MMOL/L (ref 135–145)
WBC # BLD AUTO: 8.8 10E3/UL (ref 4–11)

## 2024-08-14 PROCEDURE — 250N000009 HC RX 250: Performed by: INTERNAL MEDICINE

## 2024-08-14 PROCEDURE — 36415 COLL VENOUS BLD VENIPUNCTURE: CPT | Performed by: HOSPITALIST

## 2024-08-14 PROCEDURE — 94640 AIRWAY INHALATION TREATMENT: CPT

## 2024-08-14 PROCEDURE — 82805 BLOOD GASES W/O2 SATURATION: CPT | Performed by: INTERNAL MEDICINE

## 2024-08-14 PROCEDURE — 250N000009 HC RX 250: Performed by: COLON & RECTAL SURGERY

## 2024-08-14 PROCEDURE — 74177 CT ABD & PELVIS W/CONTRAST: CPT

## 2024-08-14 PROCEDURE — 99233 SBSQ HOSP IP/OBS HIGH 50: CPT | Performed by: HOSPITALIST

## 2024-08-14 PROCEDURE — 250N000011 HC RX IP 250 OP 636: Performed by: INTERNAL MEDICINE

## 2024-08-14 PROCEDURE — 86140 C-REACTIVE PROTEIN: CPT | Performed by: HOSPITALIST

## 2024-08-14 PROCEDURE — 36415 COLL VENOUS BLD VENIPUNCTURE: CPT | Performed by: INTERNAL MEDICINE

## 2024-08-14 PROCEDURE — 94660 CPAP INITIATION&MGMT: CPT

## 2024-08-14 PROCEDURE — 250N000013 HC RX MED GY IP 250 OP 250 PS 637: Performed by: STUDENT IN AN ORGANIZED HEALTH CARE EDUCATION/TRAINING PROGRAM

## 2024-08-14 PROCEDURE — 94640 AIRWAY INHALATION TREATMENT: CPT | Mod: 76

## 2024-08-14 PROCEDURE — 250N000013 HC RX MED GY IP 250 OP 250 PS 637: Performed by: INTERNAL MEDICINE

## 2024-08-14 PROCEDURE — 85025 COMPLETE CBC W/AUTO DIFF WBC: CPT | Performed by: HOSPITALIST

## 2024-08-14 PROCEDURE — 250N000012 HC RX MED GY IP 250 OP 636 PS 637: Performed by: INTERNAL MEDICINE

## 2024-08-14 PROCEDURE — 80048 BASIC METABOLIC PNL TOTAL CA: CPT | Performed by: HOSPITALIST

## 2024-08-14 PROCEDURE — 250N000011 HC RX IP 250 OP 636: Performed by: COLON & RECTAL SURGERY

## 2024-08-14 PROCEDURE — G0463 HOSPITAL OUTPT CLINIC VISIT: HCPCS

## 2024-08-14 PROCEDURE — 94799 UNLISTED PULMONARY SVC/PX: CPT

## 2024-08-14 PROCEDURE — 120N000001 HC R&B MED SURG/OB

## 2024-08-14 PROCEDURE — 999N000157 HC STATISTIC RCP TIME EA 10 MIN

## 2024-08-14 RX ORDER — BENZONATATE 100 MG/1
100 CAPSULE ORAL 3 TIMES DAILY PRN
Status: DISCONTINUED | OUTPATIENT
Start: 2024-08-15 | End: 2024-08-19 | Stop reason: HOSPADM

## 2024-08-14 RX ORDER — BENZONATATE 100 MG/1
100 CAPSULE ORAL 3 TIMES DAILY PRN
Status: DISCONTINUED | OUTPATIENT
Start: 2024-08-14 | End: 2024-08-14

## 2024-08-14 RX ORDER — IOPAMIDOL 755 MG/ML
500 INJECTION, SOLUTION INTRAVASCULAR ONCE
Status: COMPLETED | OUTPATIENT
Start: 2024-08-14 | End: 2024-08-14

## 2024-08-14 RX ORDER — METHYLPREDNISOLONE SODIUM SUCCINATE 40 MG/ML
40 INJECTION, POWDER, LYOPHILIZED, FOR SOLUTION INTRAMUSCULAR; INTRAVENOUS EVERY 8 HOURS
Status: DISCONTINUED | OUTPATIENT
Start: 2024-08-14 | End: 2024-08-16

## 2024-08-14 RX ORDER — GUAIFENESIN 200 MG/10ML
200 LIQUID ORAL EVERY 4 HOURS PRN
Status: DISCONTINUED | OUTPATIENT
Start: 2024-08-14 | End: 2024-08-19 | Stop reason: HOSPADM

## 2024-08-14 RX ORDER — BENZONATATE 100 MG/1
100 CAPSULE ORAL EVERY 8 HOURS
Status: DISPENSED | OUTPATIENT
Start: 2024-08-14 | End: 2024-08-16

## 2024-08-14 RX ADMIN — DILTIAZEM HYDROCHLORIDE 240 MG: 240 CAPSULE, COATED, EXTENDED RELEASE ORAL at 08:02

## 2024-08-14 RX ADMIN — LORAZEPAM 0.5 MG: 0.5 TABLET ORAL at 08:03

## 2024-08-14 RX ADMIN — BENZONATATE 100 MG: 100 CAPSULE ORAL at 20:12

## 2024-08-14 RX ADMIN — ATORVASTATIN CALCIUM 20 MG: 20 TABLET, FILM COATED ORAL at 08:01

## 2024-08-14 RX ADMIN — LEVOTHYROXINE SODIUM 112 MCG: 0.11 TABLET ORAL at 08:02

## 2024-08-14 RX ADMIN — SODIUM CHLORIDE SOLN NEBU 3% 4 ML: 3 NEBU SOLN at 19:45

## 2024-08-14 RX ADMIN — AZITHROMYCIN DIHYDRATE 500 MG: 250 TABLET ORAL at 08:02

## 2024-08-14 RX ADMIN — LEVALBUTEROL HYDROCHLORIDE 0.63 MG: 0.63 SOLUTION RESPIRATORY (INHALATION) at 08:07

## 2024-08-14 RX ADMIN — METOPROLOL TARTRATE 37.5 MG: 25 TABLET, FILM COATED ORAL at 20:12

## 2024-08-14 RX ADMIN — LEVALBUTEROL HYDROCHLORIDE 0.63 MG: 0.63 SOLUTION RESPIRATORY (INHALATION) at 19:43

## 2024-08-14 RX ADMIN — LORAZEPAM 0.5 MG: 0.5 TABLET ORAL at 12:03

## 2024-08-14 RX ADMIN — IOPAMIDOL 74 ML: 755 INJECTION, SOLUTION INTRAVENOUS at 23:13

## 2024-08-14 RX ADMIN — PAROXETINE HYDROCHLORIDE 20 MG: 20 TABLET, FILM COATED ORAL at 08:03

## 2024-08-14 RX ADMIN — FLUTICASONE FUROATE AND VILANTEROL 1 PUFF: 200; 25 POWDER RESPIRATORY (INHALATION) at 08:08

## 2024-08-14 RX ADMIN — PREDNISONE 40 MG: 20 TABLET ORAL at 08:02

## 2024-08-14 RX ADMIN — SODIUM CHLORIDE SOLN NEBU 3% 4 ML: 3 NEBU SOLN at 08:07

## 2024-08-14 RX ADMIN — LEVALBUTEROL HYDROCHLORIDE 0.63 MG: 0.63 SOLUTION RESPIRATORY (INHALATION) at 15:14

## 2024-08-14 RX ADMIN — APIXABAN 5 MG: 5 TABLET, FILM COATED ORAL at 08:02

## 2024-08-14 RX ADMIN — METOPROLOL TARTRATE 37.5 MG: 25 TABLET, FILM COATED ORAL at 08:02

## 2024-08-14 RX ADMIN — LEVALBUTEROL HYDROCHLORIDE 0.63 MG: 0.63 SOLUTION RESPIRATORY (INHALATION) at 12:36

## 2024-08-14 RX ADMIN — UMECLIDINIUM 1 PUFF: 62.5 AEROSOL, POWDER ORAL at 08:08

## 2024-08-14 RX ADMIN — SODIUM CHLORIDE 59 ML: 9 INJECTION, SOLUTION INTRAVENOUS at 23:14

## 2024-08-14 RX ADMIN — METHYLPREDNISOLONE SODIUM SUCCINATE 40 MG: 40 INJECTION INTRAMUSCULAR; INTRAVENOUS at 20:11

## 2024-08-14 ASSESSMENT — ACTIVITIES OF DAILY LIVING (ADL)
ADLS_ACUITY_SCORE: 27
ADLS_ACUITY_SCORE: 26
ADLS_ACUITY_SCORE: 26
ADLS_ACUITY_SCORE: 30
ADLS_ACUITY_SCORE: 27
ADLS_ACUITY_SCORE: 27
ADLS_ACUITY_SCORE: 26
ADLS_ACUITY_SCORE: 30
ADLS_ACUITY_SCORE: 30
ADLS_ACUITY_SCORE: 27
ADLS_ACUITY_SCORE: 30
ADLS_ACUITY_SCORE: 30
ADLS_ACUITY_SCORE: 26
ADLS_ACUITY_SCORE: 26
ADLS_ACUITY_SCORE: 30
ADLS_ACUITY_SCORE: 26
ADLS_ACUITY_SCORE: 27
ADLS_ACUITY_SCORE: 26
ADLS_ACUITY_SCORE: 26

## 2024-08-14 NOTE — PLAN OF CARE
"Goal Outcome Evaluation:      Plan of Care Reviewed With: patient    Overall Patient Progress: no changeOverall Patient Progress: no change     Outcome Evaluation: SBA to commode; ostomy scant output; voiding; BIPAP on; remote tele-SB 50s w/ PVCs; denies pain; remdesivir completed; d/c TBD     Problem: Adult Inpatient Plan of Care  Goal: Plan of Care Review  Description: The Plan of Care Review/Shift note should be completed every shift.  The Outcome Evaluation is a brief statement about your assessment that the patient is improving, declining, or no change.  This information will be displayed automatically on your shift  note.  Outcome: Progressing  Flowsheets (Taken 8/14/2024 0526)  Plan of Care Reviewed With: patient  Overall Patient Progress: no change  Goal: Patient-Specific Goal (Individualized)  Description: You can add care plan individualizations to a care plan. Examples of Individualization might be:  \"Parent requests to be called daily at 9am for status\", \"I have a hard time hearing out of my right ear\", or \"Do not touch me to wake me up as it startles  me\".  Outcome: Progressing  Goal: Absence of Hospital-Acquired Illness or Injury  Outcome: Progressing  Intervention: Identify and Manage Fall Risk  Recent Flowsheet Documentation  Taken 8/13/2024 2032 by Leighton Keene RN  Safety Promotion/Fall Prevention:   activity supervised   assistive device/personal items within reach   clutter free environment maintained   increased rounding and observation   increase visualization of patient   lighting adjusted   nonskid shoes/slippers when out of bed   patient and family education   room organization consistent   safety round/check completed   supervised activity  Intervention: Prevent Skin Injury  Recent Flowsheet Documentation  Taken 8/13/2024 2032 by Leighton Keene, RN  Body Position: position changed independently  Intervention: Prevent Infection  Recent Flowsheet Documentation  Taken 8/13/2024 2032 by " Leighton Keene RN  Infection Prevention:   rest/sleep promoted   cohorting utilized   single patient room provided   visitors restricted/screened   personal protective equipment utilized  Goal: Optimal Comfort and Wellbeing  Outcome: Progressing  Intervention: Monitor Pain and Promote Comfort  Recent Flowsheet Documentation  Taken 8/13/2024 2032 by Leighton Keene RN  Pain Management Interventions:   declines   rest  Goal: Readiness for Transition of Care  Outcome: Progressing     Problem: Skin Injury Risk Increased  Goal: Skin Health and Integrity  Outcome: Progressing  Intervention: Plan: Nurse Driven Intervention: Moisture Management  Recent Flowsheet Documentation  Taken 8/13/2024 2000 by Leighton eKene RN  Moisture Interventions: Encourage regular toileting  Bathing/Skin Care:   dressed/undressed   linen changed  Intervention: Optimize Skin Protection  Recent Flowsheet Documentation  Taken 8/13/2024 2032 by Leighton Keene RN  Activity Management:   activity adjusted per tolerance   up to bedside commode   back to bed  Head of Bed (HOB) Positioning: HOB at 30-45 degrees     Problem: Infection  Goal: Absence of Infection Signs and Symptoms  Outcome: Progressing  Intervention: Prevent or Manage Infection  Recent Flowsheet Documentation  Taken 8/13/2024 2032 by Leighton Keene RN  Infection Management: aseptic technique maintained  Isolation Precautions: special precautions maintained     Problem: COPD (Chronic Obstructive Pulmonary Disease)  Goal: Optimal Chronic Illness Coping  Outcome: Progressing  Goal: Optimal Level of Functional [place]  Outcome: Progressing  Intervention: Optimize Functional Ability  Recent Flowsheet Documentation  Taken 8/13/2024 2032 by Leighton Keene RN  Activity Management:   activity adjusted per tolerance   up to bedside commode   back to bed  Goal: Absence of Infection Signs and Symptoms  Outcome: Progressing  Intervention: Prevent or Manage Infection  Recent Flowsheet  Documentation  Taken 8/13/2024 2032 by Leighton Keene RN  Infection Management: aseptic technique maintained  Isolation Precautions: special precautions maintained  Goal: Improved Oral Intake  Outcome: Progressing  Goal: Effective Oxygenation and Ventilation  Outcome: Progressing  Intervention: Promote Airway Secretion Clearance  Recent Flowsheet Documentation  Taken 8/13/2024 2032 by Leighton Keene RN  Activity Management:   activity adjusted per tolerance   up to bedside commode   back to bed  Intervention: Optimize Oxygenation and Ventilation  Recent Flowsheet Documentation  Taken 8/13/2024 2032 by Leighton Keene RN  Head of Bed (HOB) Positioning: HOB at 30-45 degrees     Problem: Comorbidity Management  Goal: Maintenance of COPD Symptom Control  Outcome: Progressing  Intervention: Maintain COPD Symptom Control  Recent Flowsheet Documentation  Taken 8/13/2024 2032 by Leighton Keene RN  Medication Review/Management: medications reviewed  Goal: Blood Pressure in Desired Range  Outcome: Progressing  Intervention: Maintain Blood Pressure Management  Recent Flowsheet Documentation  Taken 8/13/2024 2032 by Leighton Keene RN  Medication Review/Management: medications reviewed

## 2024-08-14 NOTE — PROGRESS NOTES
RT NOTE:    Patient came of the BiPAP this morning and was placed on HFNC 20L and 25% FiO2. Pt tolerated HFNC until 3pm and had some anxiety resulting to be placed back on BiPAP for SOB. Pt currently on a BiPAP of  14/7 @ 21% FiO2 via the mask. The bridge of the nose remains intact.     Pt said the schedule nebs is giving her anxiety and would want to remain on BiPAP before each neb treatment. MD and RN are well informed. VBG was obtained shortly after placed back on BiPAP. RT to continue to follow and assess the pt's current respiratory status and needs.    Venous Blood Gas  Recent Labs   Lab 08/14/24  1514 08/09/24  1217 08/08/24  2243   PHV 7.42  --  7.33   PCO2V 46  --  51*   PO2V 72*  --  66*   HCO3V 30*  --  27   SUSANNAH 4.5*  --  0.2   O2PER 24 25 21       Ulices Escalante, RT     rectal

## 2024-08-14 NOTE — PROGRESS NOTES
[facility] Nurse Inpatient Assessment     Consulted for: Colostomy    Summary: Est colostomy with large prolapse.  Patient had area of stomal hyperplasia on 8/12/24, area is now significantly larger and is necrotic appearing.  Requested colorectal consult from hospitalist.      Patient History (according to provider note(s):      58 year old female with PMH including severe COPD on 2-3 L O2 requiring previous tracheostomy since did not cannulated and intubation earlier this year, paroxysmal A-fib on Eliquis, HTN, HLD, hypothyroidism, anemia, MDD, anxiety, and large bowel obstruction requiring ostomy placement who presents with severe shortness of breath.  For the last 1 to 2 days she has felt fatigued and had a headache.  Throughout the day today she became severely short of breath and wheezy.  She tried nebulizer treatments without improvement.  She has been taking prednisone 10 mg daily to try to optimize her lung status to get her ostomy takedown surgery, but today she took 30 mg due to her wheezing.  She denies any cough, myalgias, nausea, vomiting, diarrhea, chest pain.  She has had COVID-19 before and is vaccinated.  She has required intubation for COPD exacerbations the past including in January and she required a tracheostomy a little over 1 year ago.    Assessment:      Assessment of established loop Colostomy:  Diagnosis Pertinent to Stoma: Bowel Obstruction                                       Surgery Date: 7/12/23  Surgeon:[facility]: [facility]  Pouching system in place on assessment today: [place] one piece, cut to fit, flat, and barrier ring, brava strips.   Pouch barrier status: Leaking  Pouch last changed/wear time: 3 days  Reason for pouch change today:  leakage and routine pouch change  Effectiveness of current pouching/ supply plan:  will order new pouch to try at next visit  Change made with ostomy management  today: Yes  Pouching system placed today: Michael one piece, flat, barrier ring, and ostomy strips   Supplies: at bedside, discussed with RN, and discussed with patient   Last photo: 8/14/24 8/12/24    Stoma location: Midline  Stoma size: 2 3/4 inches,   Stoma appearance: moist, about 6 inch prolapse- laceration from 2-9 o clock, large area of tissue below stoma measuring 5x6cm, covered in slough and nonviable tissue  Mucocutaneous junction:  intact  Peristomal complication(s): none   Output: soft  Output volume emptied during visit: 0ml  Abdominal assessment: Distended        Treatment Plan:     Midline Colostomy pouching plan:   Pouching system: ostomy supplies pouches: Michael Flat FECAL ([id number])   Accessories used: [facility] ostomy accessories: Brava Barrier Strip ([id number]) and Brava Ring  (patient provided   Frequency of pouch changes: Three times a week  WOC follow up plan:  MWF  Bedside RN interventions: Change pouch PRN if leaking using the supplies above, Empty pouch when 1/3 to 1/2 full, ensure to clean pouch outlet after emptying to prevent odor, Notify WOC for ongoing pouch leakage, Document stoma appearance and output volume, color, and consistency every shift, Encourage patient to empty pouch with assist, and patient can help with pouching    Ordered larger high output pouch to try on next visit.      Orders: Reviewed    RECOMMEND PRIMARY TEAM ORDER: None, at this time  Education provided: plan of care  Discussed plan of care with: Patient and Nurse  WOC nurse follow-up plan: MWF  Notify WOC if wound(s) deteriorate.  Nursing to notify the Provider(s) and re-consult the WOC Nurse if new skin concern.    DATA:     Current support surface: Standard  Low air loss (YONAS pump, Isolibrium, Pulsate)  Containment of urine/stool: Colostomy pouch  BMI: Body mass index is 25.91 kg/m .   Active diet order: Orders Placed This Encounter      Regular Diet Adult     Output: I/O last 3 completed shifts:  In: 60  [P.O.:60]  Out: 425 [Stool:425]     Labs:   Recent Labs   Lab 08/14/24  0624 08/11/24  0540 08/10/24  0534   ALBUMIN  --   --  3.9   HGB 10.7*   < > 11.4*   WBC 8.8   < > 9.4    < > = values in this interval not displayed.     Pressure injury risk assessment:   Sensory Perception: (P) 4-->no impairment  Moisture: (P) 4-->rarely moist  Activity: (P) 3-->walks occasionally  Mobility: (P) 3-->slightly limited  Nutrition: (P) 2-->probably inadequate  Friction and Shear: (P) 3-->no apparent problem  Lee Score: (P) 19    Harlan Delgado RN CWOCN  Contact [facility] Nurse (Baljit)  Dept. Office Number: [phone]

## 2024-08-14 NOTE — PLAN OF CARE
Goal Outcome Evaluation:      Plan of Care Reviewed With: patient    Overall Patient Progress: improvingOverall Patient Progress: improving    Outcome Evaluation: ostomy good output of 175, lungs decreased and diminished throughout. cough congested. RT switched Bipap over to high flow 30 %, 25 L. takes meds whole. on reg diet.  cont pulse ox on.    VS-stable. Afebrile, BLUE   Lung Sounds-diminished throughout, exp wheeze on SUSY. On high flow during the day. Pt feels comfort with bipap. Cont pulse ox on. Congested dry nonprod cough.   O2-was on bipap during the night. Changed over to high flow during the day with RT. Cont pulse ox on. 90%. On 30% 25 L getting nebs. Pt states nebs make  her anxious--ativan po given as premed.   GI-+Bs. +flatus. Ostomy put out 175. Brown. Stoma is protuding/prolapsing red, swollen,   - voiding.   IVF-sl iv .   Dressings-none. Colostomy.   CMS-denies numbness and tingling, +pp. Mild swelling in hands and feet.   Drain-none.   Activity-bedrest. Turning in bed.   Pain-denies pain ,   D/C Plan- has +covid.

## 2024-08-14 NOTE — PROGRESS NOTES
"Respiratory Care note:    A BiPAP of  16/8 @ 24% remains on the pt via the mask for an increase in WOB and/or SOB.  The bridge of the nose looks good and remains intact. Pt is tolerating it well. Will continue to monitor and assess the pt's current respiratory status and needs.    BP (!) 148/82 (BP Location: Left arm)   Pulse 57   Temp 97.1  F (36.2  C) (Temporal)   Resp 21   Ht 1.613 m (5' 3.5\")   Wt 67.4 kg (148 lb 9.4 oz)   SpO2 95%   BMI 25.91 kg/m      Renny Gamboa RT on 8/14/2024 at 5:14 AM      "

## 2024-08-14 NOTE — CONSULTS
[facility]  Colon and Rectal Surgery Consult Note  Name: Lara Melendez    MRN: [id number]  YOB: 1965    Age: 58 year old  Date of admission: 8/8/2024  Primary care provider: Sudhir Carroll     Requesting Physician:  Dr. Baker   Reason for consult:  Evaluate colostomy           History of Present Illness:   Lara Melendez is a 58 year old female with a history of severe COPD (on 2-3 L at home), atrial fibrillation (on Eliquis), HTN, HLD, hypothyroidism, anemia, MDD, anxiety, and a LBO s/p diverting loop transverse colostomy with Dr. Rashid on 7/13/2023, seen at the request of Dr. Baker, admitted with a severe COPD exacerbation and found to be COVID positive. CXR without any infiltrate. She is currently using a Bipap at night and is on OV steroids, nebulizer treatment, and a short course of IV Remdesivir.     She is on baseline oxygen at home at the minimum at night but sometimes throughout the day.  She has been on steroids rather continuously for the past year.    She was admitted last July and was having significant respiratory failure from her COPD and was on a ventilator with a tracheostomy.  She was found to have dilated colon that only partially resolved with neostigmine and laxatives.  She underwent a transverse loop colostomy on 12 July 2023.  She has had intermittent issues with prolapse.  Interestingly in she had a Gastrografin enema through [facility] on November 1 which showed contrast easily flowing beyond the rectum into the sigmoid colon with marked diverticulosis and a 10 cm area of stricturing and LAD irregular narrowing.  Contrast does flow more frequently proximally.  No convincing apple core was noted.  Contrast fills to the level of the stoma.  I could not find any records of a colonoscopy.    During this hospitalization she has had ongoing prolapse and ongoing cough.  She was evaluated by the wound ostomy care nurse today and found to have additional  tissue protruding from the inferior medial aspect of her colostomy.  The colostomy itself appears to be functioning well.  She denies nausea, vomiting, abdominal pain, fevers, chills, or other GI concerns.  She remains on high flow BiPAP, as well as steroids and has recently been treated for COVID.        Colonoscopy History: No records    Surgical History: Open transverse colostomy July 2023            Past Medical History:     Past Medical History:   Diagnosis Date    Anxiety     COPD (chronic obstructive pulmonary disease)     Diverticulitis of colon     Hypercholesterolemia     Hypertension     Hypothyroidism     Infection due to 2019 novel coronavirus 05/14/2022    Paroxysmal atrial fibrillation     Psoriasis     Pulmonary nodule - left upper lobe              Past Surgical History:     Past Surgical History:   Procedure Laterality Date    CHOLECYSTECTOMY      COLOSTOMY N/A 7/12/2023    Procedure: OPENING DIVERTING COLOSTOMY;  Surgeon: Jaspal Rashid MD;  Location: [facility] OR    INCISION AND DRAINAGE MANDIBLE, COMBINED Left 01/10/2022    Procedure: INCISION AND DRAINAGE, MANDIBLE;  Surgeon: Jn Feliz DDS;  Location: [facility] OR    INCISION AND DRAINAGE MANDIBLE, COMBINED Left 02/04/2022    Procedure: INCISION AND DRAINAGE, MANDIBLE;  Surgeon: Taylor Ortez DDS;  Location: [facility] OR    TOOTH EXTRACTION      Vocal Cord surgery                 Social History:     Social History     Tobacco Use    Smoking status: Never    Smokeless tobacco: Never   Substance Use Topics    Alcohol use: Yes     Comment: occ             Family History:     Family History   Problem Relation Age of Onset    Deep Vein Thrombosis (DVT) Mother     Hypertension Mother              Allergies:     Allergies   Allergen Reactions    Sulfa Antibiotics     Doxycycline Other (See Comments)     Develops chest tightness  Intolerance not allergy    Penicillins Rash     Generalized rash  Rash, Generalized               Medications:     Current  Facility-Administered Medications   Medication Dose Route Frequency Provider Last Rate Last Admin    apixaban ANTICOAGULANT (ELIQUIS) tablet 5 mg  5 mg Oral BID Albin Navarro MD   5 mg at 08/14/24 0802    atorvastatin (LIPITOR) tablet 20 mg  20 mg Oral Daily Albin Navarro MD   20 mg at 08/14/24 0801    azithromycin (ZITHROMAX) tablet 500 mg  500 mg Oral Q Mon Wed Fri AM Albin Navarro MD   500 mg at 08/14/24 0802    diltiazem ER COATED BEADS (CARDIZEM CD/CARTIA XT) 24 hr capsule 240 mg  240 mg Oral Daily Eileen Lara, DO   240 mg at 08/14/24 0802    fluticasone-vilanterol (BREO ELLIPTA) 200-25 MCG/ACT inhaler 1 puff [PATIENT-SUPPLIED]  1 puff Inhalation Daily Eileen Lara DO   1 puff at 08/14/24 0808    levalbuterol (XOPENEX) neb solution 0.63 mg  0.63 mg Nebulization 4x Daily Brandt Gonzalez MD   0.63 mg at 08/14/24 1514    levothyroxine (SYNTHROID/LEVOTHROID) tablet 112 mcg  112 mcg Oral Daily Albin Navarro MD   112 mcg at 08/14/24 0802    metoprolol tartrate (LOPRESSOR) half-tab 37.5 mg  37.5 mg Oral BID Albin Navarro MD   37.5 mg at 08/14/24 0802    PARoxetine (PAXIL) tablet 20 mg  20 mg Oral Daily Albin Navarro MD   20 mg at 08/14/24 0803    predniSONE (DELTASONE) tablet 40 mg  40 mg Oral Daily Brandt Gonzalez MD   40 mg at 08/14/24 0802    Followed by    [START ON 8/18/2024] predniSONE (DELTASONE) tablet 30 mg  30 mg Oral Daily Brandt Gonzalez MD        Followed by    [START ON 8/23/2024] predniSONE (DELTASONE) tablet 20 mg  20 mg Oral Daily Barndt Gonzalez MD        Followed by    [START ON 8/28/2024] predniSONE (DELTASONE) tablet 10 mg  10 mg Oral Daily Brandt Gonzalez MD        sodium chloride (NEBUSAL) 3 % neb solution 4 mL  4 mL Nebulization BID Albin Navarro MD   4 mL at 08/14/24 0807    umeclidinium (INCRUSE ELLIPTA) 62.5 MCG/ACT inhaler 1 puff  1 puff Inhalation Daily Brandt Gonzalez MD   1 puff at  "08/14/24 0808             Review of Systems:   A comprehensive greater than 10 system review of systems was carried out.  Pertinent positives and negatives are noted above.  Otherwise negative for contributory info.            Physical Exam:     Blood pressure 127/68, pulse 66, temperature 97.9  F (36.6  C), temperature source Temporal, resp. rate 22, height 1.613 m (5' 3.5\"), weight 67.4 kg (148 lb 9.4 oz), SpO2 94%.    Intake/Output Summary (Last 24 hours) at 8/14/2024 1654  Last data filed at 8/14/2024 1300  Gross per 24 hour   Intake 60 ml   Output 425 ml   Net -365 ml     EXAM:  GEN: Awake alert and oriented, appears her stated age  PULM: Labored breathing on full BiPAP    ABD: Soft, non-tender, non-distended.  No rebound or guarding.  There is a mid epigastric stoma with the distal end prolapsing.  The functional limb is to the right and cephalad and is flush.  Inferior to the stoma coming from the MCJ is a roughly 10 cm appendage which appears to be fat with fecal staining.  Does not appear to be bowel.    RECTAL: Rectal exam was not performed  NEURO: CN II-XII grossly intact  MSK: extremeties with no clubbing, cyanosis or edema; able to ambulate to the commode on her BiPAP  PSYCH: responsive, alert, cooperative; oriented x3; appropriate mood and affect  EXT/SKIN: inspection reveals no rashes, lesions or ulcers, normal coloring         Data Reviewed:     Results for orders placed or performed during the hospital encounter of 08/08/24   XR Chest Port 1 View    Narrative    EXAM: XR CHEST PORT 1 VIEW  LOCATION: [facility]  DATE: 8/8/2024    INDICATION: SOB  COMPARISON: 2/5/2024.      Impression    IMPRESSION: Normal heart size. No evidence for CHF or pneumonia. No pleural effusion or pneumothorax. Cluster of tiny calcifications in the left upper lobe likely related to prior granulomas process and similar to chest CT 6/26/2023.       Recent Labs   Lab 08/09/24  1217   PH 7.36   PO2 110* " "  PCO2 47*   HCO3 26   DEEPTI 0.4     Recent Labs   Lab 08/14/24  1514 08/08/24  2243   PHV 7.42 7.33   PO2V 72* 66*   PCO2V 46 51*   HCO3V 30* 27     Recent Labs   Lab 08/14/24  0624 08/12/24  0523 08/11/24  0540   HGB 10.7* 10.8* 12.1     Recent Labs   Lab 08/10/24  0534   AST 17   ALT 32   ALKPHOS 87   BILITOTAL 0.2     No results for input(s): \"INR\" in the last 168 hours.  Recent Labs   Lab 08/08/24  2243   LACT 1.5         Assessment and Plan:   Ewa is a 59yo woman with COPD and poor pulmonary function with recent COVID and extensive coughing found to have stomal prolapse with protrusion of fatty tissue inferior to the stoma.  She has recently eaten a full meal and is on BiPAP, steroids, Eliquis, and recent treatment for COVID.  Fortunately she is stable at this time.  Will plan to obtain a CT scan of the abdomen and pelvis to assess for other intra-abdominal problems.  We did discuss that she may need revision of her stoma but would like to do this during daytime's hours given her pulmonary and other medical issues.  We discussed that this would likely create this into an colostomy with mucous fistula and possible resection of this fatty tissue with attempt at primary repair of her hernia.  We did discuss with the ongoing coughing and pulmonary issues the hernias likely recur and further prolapse is possible.  We discussed the risk of bleeding, infection, prolonged intubation, demonstrating structures, need for more extended laparotomy, and other issues.  We discussed that we would not be reversing the stoma at this time given her ongoing medical issues, chronic steroids and incomplete workup, at least as far as I can tell there is no colonoscopy.  Plan:  Continue admission to the hospitalist service  Surgery: Will likely need surgery soon but not urgent at this time.  Diet: N.p.o.  IV Fluids: Fluids per hospitalist  Antibiotics: Antibiotics per hospitalist  Medications: Continue home meds hold Eliquis for " A-fib at this time, this was discussed with the covering physician  I&O s:  strict I&O s  Labs:   - Reviewed: by myself    Imaging:   - Ordered: Ordered CT scan of the abdomen pelvis to assess for intra-abdominal issues  Activity: Minimize ambulation and coughing as possible.  Will have her place a binder to help reduce further prolapse or full evisceration.  DVT prophylaxis: SCD s, hold Eliquis  This plan has been discussed with Dr. Prieto    Patient specific identified risk factors considered as part of today s evaluation include: Obesity, COPD, steroids, blood thinners, prior abdominal surgery, deconditioning.      Additional history obtained from patient and the chart.  Time spent on consultation: 75 minutes, greater than 50 percent of the total encounter time is spent in counseling and/or coordination of care          Laurita Marques MD  [facility]  [address] [place] [address], [address]  [place] [address]  T: [phone]  F: [phone]   [email or web address]

## 2024-08-14 NOTE — PLAN OF CARE
"A&Ox4.  Denies pain.  Used Bipap x1 otherwise on hi dora, RT following.  Patient anxious, requests ativan prior to nebs due to anxiety.  LS with wheezes, O2 sats 93% on hi dora.  Ostomy with prolapsed stoma, noted area of tissue protruding from left side of stoma.  WOC notified and assessed.  Colorectal consult placed.  Pouch changed by WOC.  Continues on special precautions.  Problem: Adult Inpatient Plan of Care  Goal: Plan of Care Review  Description: The Plan of Care Review/Shift note should be completed every shift.  The Outcome Evaluation is a brief statement about your assessment that the patient is improving, declining, or no change.  This information will be displayed automatically on your shift  note.  Outcome: Progressing  Flowsheets (Taken 8/14/2024 1539)  Plan of Care Reviewed With: patient  Overall Patient Progress: improving  Goal: Patient-Specific Goal (Individualized)  Description: You can add care plan individualizations to a care plan. Examples of Individualization might be:  \"Parent requests to be called daily at 9am for status\", \"I have a hard time hearing out of my right ear\", or \"Do not touch me to wake me up as it startles  me\".  Outcome: Progressing  Goal: Absence of Hospital-Acquired Illness or Injury  Outcome: Progressing  Intervention: Identify and Manage Fall Risk  Recent Flowsheet Documentation  Taken 8/14/2024 1200 by Aleshia Peña, RN  Safety Promotion/Fall Prevention:   activity supervised   clutter free environment maintained   nonskid shoes/slippers when out of bed   patient and family education   safety round/check completed  Intervention: Prevent Skin Injury  Recent Flowsheet Documentation  Taken 8/14/2024 1200 by Aleshia Peña, RN  Body Position: position changed independently  Device Skin Pressure Protection:   tubing/devices free from skin contact   adhesive use limited  Intervention: Prevent Infection  Recent Flowsheet Documentation  Taken 8/14/2024 1200 by Casey, " Aleshia Peraza RN  Infection Prevention:   single patient room provided   personal protective equipment utilized  Goal: Optimal Comfort and Wellbeing  Outcome: Progressing  Intervention: Provide Person-Centered Care  Recent Flowsheet Documentation  Taken 8/14/2024 1200 by Aleshia Peña RN  Trust Relationship/Rapport: care explained  Goal: Readiness for Transition of Care  Outcome: Progressing     Problem: Skin Injury Risk Increased  Goal: Skin Health and Integrity  Outcome: Progressing  Intervention: Plan: Nurse Driven Intervention: Moisture Management  Recent Flowsheet Documentation  Taken 8/14/2024 1200 by Aleshia Peña RN  Moisture Interventions:   Encourage regular toileting   No brief in bed  Plan: Moisture Management:   no brief in bed   incontinence pad  Intervention: Optimize Skin Protection  Recent Flowsheet Documentation  Taken 8/14/2024 1200 by Aleshia Peña RN  Pressure Reduction Techniques: heels elevated off bed  Pressure Reduction Devices: positioning supports utilized  Activity Management:   activity adjusted per tolerance   up to bedside commode  Head of Bed (HOB) Positioning: HOB at 30 degrees     Problem: Infection  Goal: Absence of Infection Signs and Symptoms  Outcome: Progressing  Intervention: Prevent or Manage Infection  Recent Flowsheet Documentation  Taken 8/14/2024 1200 by Aleshia Peña RN  Infection Management: aseptic technique maintained  Isolation Precautions: special precautions maintained     Problem: COPD (Chronic Obstructive Pulmonary Disease)  Goal: Optimal Chronic Illness Coping  Outcome: Progressing  Intervention: Support and Optimize Psychosocial Response  Recent Flowsheet Documentation  Taken 8/14/2024 1200 by Aleshia Peña RN  Supportive Measures:   active listening utilized   self-care encouraged  Life Transition/Adjustment: decision-making facilitated  Goal: Optimal Level of Functional [place]  Outcome: Progressing  Intervention: Optimize  Functional Ability  Recent Flowsheet Documentation  Taken 8/14/2024 1200 by Aleshia Peña RN  Self-Care Promotion: independence encouraged  Activity Management:   activity adjusted per tolerance   up to bedside commode  Environmental Support: calm environment promoted  Goal: Absence of Infection Signs and Symptoms  Outcome: Progressing  Intervention: Prevent or Manage Infection  Recent Flowsheet Documentation  Taken 8/14/2024 1200 by Aleshia Peña RN  Infection Management: aseptic technique maintained  Isolation Precautions: special precautions maintained  Goal: Improved Oral Intake  Outcome: Progressing  Goal: Effective Oxygenation and Ventilation  Outcome: Progressing  Intervention: Promote Airway Secretion Clearance  Recent Flowsheet Documentation  Taken 8/14/2024 1200 by Aleshia Peña RN  Breathing Techniques/Airway Clearance: deep/controlled cough encouraged  Cough And Deep Breathing: done with encouragement  Activity Management:   activity adjusted per tolerance   up to bedside commode  Intervention: Optimize Oxygenation and Ventilation  Recent Flowsheet Documentation  Taken 8/14/2024 1200 by Aleshia Peña RN  Airway/Ventilation Management:   airway patency maintained   pulmonary hygiene promoted  Head of Bed (HOB) Positioning: HOB at 30 degrees     Problem: Comorbidity Management  Goal: Maintenance of COPD Symptom Control  Outcome: Progressing  Intervention: Maintain COPD Symptom Control  Recent Flowsheet Documentation  Taken 8/14/2024 1200 by Aleshia Peña RN  Supportive Measures:   active listening utilized   self-care encouraged  Medication Review/Management:   medications reviewed   high-risk medications identified  Goal: Blood Pressure in Desired Range  Outcome: Progressing  Intervention: Maintain Blood Pressure Management  Recent Flowsheet Documentation  Taken 8/14/2024 1200 by Aleshia Peña RN  Medication Review/Management:   medications reviewed   high-risk  medications identified   Goal Outcome Evaluation:      Plan of Care Reviewed With: patient    Overall Patient Progress: improvingOverall Patient Progress: improving

## 2024-08-15 ENCOUNTER — ANESTHESIA (OUTPATIENT)
Dept: SURGERY | Facility: CLINIC | Age: 59
End: 2024-08-15
Payer: COMMERCIAL

## 2024-08-15 ENCOUNTER — SURGERY (OUTPATIENT)
Age: 59
End: 2024-08-15
Payer: COMMERCIAL

## 2024-08-15 ENCOUNTER — ANESTHESIA EVENT (OUTPATIENT)
Dept: SURGERY | Facility: CLINIC | Age: 59
End: 2024-08-15
Payer: COMMERCIAL

## 2024-08-15 LAB
ANION GAP SERPL CALCULATED.3IONS-SCNC: 12 MMOL/L (ref 7–15)
BASOPHILS # BLD AUTO: 0 10E3/UL (ref 0–0.2)
BASOPHILS NFR BLD AUTO: 0 %
BUN SERPL-MCNC: 19.7 MG/DL (ref 8–23)
CALCIUM SERPL-MCNC: 8.6 MG/DL (ref 8.8–10.4)
CHLORIDE SERPL-SCNC: 103 MMOL/L (ref 98–107)
CREAT SERPL-MCNC: 0.59 MG/DL (ref 0.51–0.95)
EGFRCR SERPLBLD CKD-EPI 2021: >90 ML/MIN/1.73M2
EOSINOPHIL # BLD AUTO: 0 10E3/UL (ref 0–0.7)
EOSINOPHIL NFR BLD AUTO: 0 %
ERYTHROCYTE [DISTWIDTH] IN BLOOD BY AUTOMATED COUNT: 13.4 % (ref 10–15)
GLUCOSE SERPL-MCNC: 146 MG/DL (ref 70–99)
HCO3 SERPL-SCNC: 24 MMOL/L (ref 22–29)
HCT VFR BLD AUTO: 37.5 % (ref 35–47)
HGB BLD-MCNC: 11.6 G/DL (ref 11.7–15.7)
HOLD SPECIMEN: NORMAL
IMM GRANULOCYTES # BLD: 0.2 10E3/UL
IMM GRANULOCYTES NFR BLD: 3 %
LYMPHOCYTES # BLD AUTO: 0.7 10E3/UL (ref 0.8–5.3)
LYMPHOCYTES NFR BLD AUTO: 9 %
MCH RBC QN AUTO: 29 PG (ref 26.5–33)
MCHC RBC AUTO-ENTMCNC: 30.9 G/DL (ref 31.5–36.5)
MCV RBC AUTO: 94 FL (ref 78–100)
MONOCYTES # BLD AUTO: 0.2 10E3/UL (ref 0–1.3)
MONOCYTES NFR BLD AUTO: 3 %
NEUTROPHILS # BLD AUTO: 6.7 10E3/UL (ref 1.6–8.3)
NEUTROPHILS NFR BLD AUTO: 86 %
NRBC # BLD AUTO: 0 10E3/UL
NRBC BLD AUTO-RTO: 0 /100
PLATELET # BLD AUTO: 204 10E3/UL (ref 150–450)
POTASSIUM SERPL-SCNC: 3.9 MMOL/L (ref 3.4–5.3)
RBC # BLD AUTO: 4 10E6/UL (ref 3.8–5.2)
SODIUM SERPL-SCNC: 139 MMOL/L (ref 135–145)
WBC # BLD AUTO: 7.8 10E3/UL (ref 4–11)

## 2024-08-15 PROCEDURE — 250N000009 HC RX 250: Performed by: ANESTHESIOLOGY

## 2024-08-15 PROCEDURE — 99233 SBSQ HOSP IP/OBS HIGH 50: CPT | Performed by: HOSPITALIST

## 2024-08-15 PROCEDURE — 250N000013 HC RX MED GY IP 250 OP 250 PS 637: Performed by: STUDENT IN AN ORGANIZED HEALTH CARE EDUCATION/TRAINING PROGRAM

## 2024-08-15 PROCEDURE — 250N000009 HC RX 250: Performed by: INTERNAL MEDICINE

## 2024-08-15 PROCEDURE — 80048 BASIC METABOLIC PNL TOTAL CA: CPT | Performed by: HOSPITALIST

## 2024-08-15 PROCEDURE — 88307 TISSUE EXAM BY PATHOLOGIST: CPT | Mod: 26 | Performed by: PATHOLOGY

## 2024-08-15 PROCEDURE — 250N000011 HC RX IP 250 OP 636: Performed by: INTERNAL MEDICINE

## 2024-08-15 PROCEDURE — 94660 CPAP INITIATION&MGMT: CPT

## 2024-08-15 PROCEDURE — 120N000001 HC R&B MED SURG/OB

## 2024-08-15 PROCEDURE — 250N000011 HC RX IP 250 OP 636: Performed by: ANESTHESIOLOGY

## 2024-08-15 PROCEDURE — 250N000009 HC RX 250: Performed by: NURSE ANESTHETIST, CERTIFIED REGISTERED

## 2024-08-15 PROCEDURE — 88307 TISSUE EXAM BY PATHOLOGIST: CPT | Mod: TC | Performed by: STUDENT IN AN ORGANIZED HEALTH CARE EDUCATION/TRAINING PROGRAM

## 2024-08-15 PROCEDURE — 94640 AIRWAY INHALATION TREATMENT: CPT | Mod: 76

## 2024-08-15 PROCEDURE — 36415 COLL VENOUS BLD VENIPUNCTURE: CPT | Performed by: HOSPITALIST

## 2024-08-15 PROCEDURE — 0WQF0ZZ REPAIR ABDOMINAL WALL, OPEN APPROACH: ICD-10-PCS | Performed by: STUDENT IN AN ORGANIZED HEALTH CARE EDUCATION/TRAINING PROGRAM

## 2024-08-15 PROCEDURE — 250N000025 HC SEVOFLURANE, PER MIN: Performed by: STUDENT IN AN ORGANIZED HEALTH CARE EDUCATION/TRAINING PROGRAM

## 2024-08-15 PROCEDURE — 370N000017 HC ANESTHESIA TECHNICAL FEE, PER MIN: Performed by: STUDENT IN AN ORGANIZED HEALTH CARE EDUCATION/TRAINING PROGRAM

## 2024-08-15 PROCEDURE — 85025 COMPLETE CBC W/AUTO DIFF WBC: CPT | Performed by: HOSPITALIST

## 2024-08-15 PROCEDURE — 258N000003 HC RX IP 258 OP 636: Performed by: NURSE ANESTHETIST, CERTIFIED REGISTERED

## 2024-08-15 PROCEDURE — 250N000011 HC RX IP 250 OP 636: Performed by: NURSE ANESTHETIST, CERTIFIED REGISTERED

## 2024-08-15 PROCEDURE — 0DB80ZZ EXCISION OF SMALL INTESTINE, OPEN APPROACH: ICD-10-PCS | Performed by: STUDENT IN AN ORGANIZED HEALTH CARE EDUCATION/TRAINING PROGRAM

## 2024-08-15 PROCEDURE — 999N000157 HC STATISTIC RCP TIME EA 10 MIN

## 2024-08-15 PROCEDURE — 0DBL0ZZ EXCISION OF TRANSVERSE COLON, OPEN APPROACH: ICD-10-PCS | Performed by: STUDENT IN AN ORGANIZED HEALTH CARE EDUCATION/TRAINING PROGRAM

## 2024-08-15 PROCEDURE — 710N000010 HC RECOVERY PHASE 1, LEVEL 2, PER MIN: Performed by: STUDENT IN AN ORGANIZED HEALTH CARE EDUCATION/TRAINING PROGRAM

## 2024-08-15 PROCEDURE — 250N000013 HC RX MED GY IP 250 OP 250 PS 637: Performed by: INTERNAL MEDICINE

## 2024-08-15 PROCEDURE — 250N000009 HC RX 250: Performed by: STUDENT IN AN ORGANIZED HEALTH CARE EDUCATION/TRAINING PROGRAM

## 2024-08-15 PROCEDURE — 272N000001 HC OR GENERAL SUPPLY STERILE: Performed by: STUDENT IN AN ORGANIZED HEALTH CARE EDUCATION/TRAINING PROGRAM

## 2024-08-15 PROCEDURE — 999N000141 HC STATISTIC PRE-PROCEDURE NURSING ASSESSMENT: Performed by: STUDENT IN AN ORGANIZED HEALTH CARE EDUCATION/TRAINING PROGRAM

## 2024-08-15 PROCEDURE — 0D1L0Z4 BYPASS TRANSVERSE COLON TO CUTANEOUS, OPEN APPROACH: ICD-10-PCS | Performed by: STUDENT IN AN ORGANIZED HEALTH CARE EDUCATION/TRAINING PROGRAM

## 2024-08-15 PROCEDURE — 360N000077 HC SURGERY LEVEL 4, PER MIN: Performed by: STUDENT IN AN ORGANIZED HEALTH CARE EDUCATION/TRAINING PROGRAM

## 2024-08-15 RX ORDER — ALBUTEROL SULFATE 0.83 MG/ML
2.5 SOLUTION RESPIRATORY (INHALATION) EVERY 4 HOURS PRN
Status: DISCONTINUED | OUTPATIENT
Start: 2024-08-15 | End: 2024-08-15 | Stop reason: HOSPADM

## 2024-08-15 RX ORDER — ONDANSETRON 2 MG/ML
4 INJECTION INTRAMUSCULAR; INTRAVENOUS EVERY 30 MIN PRN
Status: DISCONTINUED | OUTPATIENT
Start: 2024-08-15 | End: 2024-08-15 | Stop reason: HOSPADM

## 2024-08-15 RX ORDER — ACETAMINOPHEN 325 MG/1
650 TABLET ORAL EVERY 6 HOURS
Status: DISCONTINUED | OUTPATIENT
Start: 2024-08-15 | End: 2024-08-19 | Stop reason: HOSPADM

## 2024-08-15 RX ORDER — METHOCARBAMOL 500 MG/1
1000 TABLET, FILM COATED ORAL 3 TIMES DAILY
Status: DISCONTINUED | OUTPATIENT
Start: 2024-08-15 | End: 2024-08-19 | Stop reason: HOSPADM

## 2024-08-15 RX ORDER — LIDOCAINE 40 MG/G
CREAM TOPICAL
Status: DISCONTINUED | OUTPATIENT
Start: 2024-08-15 | End: 2024-08-15 | Stop reason: HOSPADM

## 2024-08-15 RX ORDER — FENTANYL CITRATE 50 UG/ML
INJECTION, SOLUTION INTRAMUSCULAR; INTRAVENOUS PRN
Status: DISCONTINUED | OUTPATIENT
Start: 2024-08-15 | End: 2024-08-15

## 2024-08-15 RX ORDER — SODIUM CHLORIDE, SODIUM LACTATE, POTASSIUM CHLORIDE, CALCIUM CHLORIDE 600; 310; 30; 20 MG/100ML; MG/100ML; MG/100ML; MG/100ML
INJECTION, SOLUTION INTRAVENOUS CONTINUOUS
Status: DISCONTINUED | OUTPATIENT
Start: 2024-08-15 | End: 2024-08-15 | Stop reason: HOSPADM

## 2024-08-15 RX ORDER — FENTANYL CITRATE 50 UG/ML
25 INJECTION, SOLUTION INTRAMUSCULAR; INTRAVENOUS EVERY 5 MIN PRN
Status: DISCONTINUED | OUTPATIENT
Start: 2024-08-15 | End: 2024-08-15 | Stop reason: HOSPADM

## 2024-08-15 RX ORDER — DEXAMETHASONE SODIUM PHOSPHATE 4 MG/ML
4 INJECTION, SOLUTION INTRA-ARTICULAR; INTRALESIONAL; INTRAMUSCULAR; INTRAVENOUS; SOFT TISSUE
Status: DISCONTINUED | OUTPATIENT
Start: 2024-08-15 | End: 2024-08-15 | Stop reason: HOSPADM

## 2024-08-15 RX ORDER — LABETALOL HYDROCHLORIDE 5 MG/ML
10 INJECTION, SOLUTION INTRAVENOUS
Status: DISCONTINUED | OUTPATIENT
Start: 2024-08-15 | End: 2024-08-15 | Stop reason: HOSPADM

## 2024-08-15 RX ORDER — LABETALOL 20 MG/4 ML (5 MG/ML) INTRAVENOUS SYRINGE
PRN
Status: DISCONTINUED | OUTPATIENT
Start: 2024-08-15 | End: 2024-08-15

## 2024-08-15 RX ORDER — CARBOXYMETHYLCELLULOSE SODIUM 5 MG/ML
1 SOLUTION/ DROPS OPHTHALMIC
Status: DISCONTINUED | OUTPATIENT
Start: 2024-08-15 | End: 2024-08-19 | Stop reason: HOSPADM

## 2024-08-15 RX ORDER — HYDRALAZINE HYDROCHLORIDE 20 MG/ML
10 INJECTION INTRAMUSCULAR; INTRAVENOUS ONCE
Status: COMPLETED | OUTPATIENT
Start: 2024-08-15 | End: 2024-08-15

## 2024-08-15 RX ORDER — HYDRALAZINE HYDROCHLORIDE 20 MG/ML
2.5-5 INJECTION INTRAMUSCULAR; INTRAVENOUS EVERY 10 MIN PRN
Status: DISCONTINUED | OUTPATIENT
Start: 2024-08-15 | End: 2024-08-15 | Stop reason: HOSPADM

## 2024-08-15 RX ORDER — HYDROMORPHONE HCL IN WATER/PF 6 MG/30 ML
0.2 PATIENT CONTROLLED ANALGESIA SYRINGE INTRAVENOUS EVERY 5 MIN PRN
Status: DISCONTINUED | OUTPATIENT
Start: 2024-08-15 | End: 2024-08-15 | Stop reason: HOSPADM

## 2024-08-15 RX ORDER — IPRATROPIUM BROMIDE AND ALBUTEROL SULFATE 2.5; .5 MG/3ML; MG/3ML
3 SOLUTION RESPIRATORY (INHALATION)
Status: DISCONTINUED | OUTPATIENT
Start: 2024-08-15 | End: 2024-08-15 | Stop reason: HOSPADM

## 2024-08-15 RX ORDER — HYDROMORPHONE HCL IN WATER/PF 6 MG/30 ML
0.4 PATIENT CONTROLLED ANALGESIA SYRINGE INTRAVENOUS EVERY 5 MIN PRN
Status: DISCONTINUED | OUTPATIENT
Start: 2024-08-15 | End: 2024-08-15 | Stop reason: HOSPADM

## 2024-08-15 RX ORDER — OXYCODONE HYDROCHLORIDE 5 MG/1
5-10 TABLET ORAL EVERY 4 HOURS PRN
Status: DISCONTINUED | OUTPATIENT
Start: 2024-08-15 | End: 2024-08-19 | Stop reason: HOSPADM

## 2024-08-15 RX ORDER — HYDROMORPHONE HCL IN WATER/PF 6 MG/30 ML
.2-.4 PATIENT CONTROLLED ANALGESIA SYRINGE INTRAVENOUS
Status: DISCONTINUED | OUTPATIENT
Start: 2024-08-15 | End: 2024-08-19 | Stop reason: HOSPADM

## 2024-08-15 RX ORDER — MAGNESIUM HYDROXIDE 1200 MG/15ML
LIQUID ORAL PRN
Status: DISCONTINUED | OUTPATIENT
Start: 2024-08-15 | End: 2024-08-15 | Stop reason: HOSPADM

## 2024-08-15 RX ORDER — LIDOCAINE HYDROCHLORIDE 20 MG/ML
INJECTION, SOLUTION INFILTRATION; PERINEURAL PRN
Status: DISCONTINUED | OUTPATIENT
Start: 2024-08-15 | End: 2024-08-15

## 2024-08-15 RX ORDER — ONDANSETRON 2 MG/ML
INJECTION INTRAMUSCULAR; INTRAVENOUS PRN
Status: DISCONTINUED | OUTPATIENT
Start: 2024-08-15 | End: 2024-08-15

## 2024-08-15 RX ORDER — DEXAMETHASONE SODIUM PHOSPHATE 4 MG/ML
INJECTION, SOLUTION INTRA-ARTICULAR; INTRALESIONAL; INTRAMUSCULAR; INTRAVENOUS; SOFT TISSUE PRN
Status: DISCONTINUED | OUTPATIENT
Start: 2024-08-15 | End: 2024-08-15

## 2024-08-15 RX ORDER — FENTANYL CITRATE 50 UG/ML
50 INJECTION, SOLUTION INTRAMUSCULAR; INTRAVENOUS EVERY 5 MIN PRN
Status: DISCONTINUED | OUTPATIENT
Start: 2024-08-15 | End: 2024-08-15 | Stop reason: HOSPADM

## 2024-08-15 RX ORDER — METRONIDAZOLE 500 MG/100ML
INJECTION, SOLUTION INTRAVENOUS PRN
Status: DISCONTINUED | OUTPATIENT
Start: 2024-08-15 | End: 2024-08-15

## 2024-08-15 RX ORDER — SODIUM CHLORIDE, SODIUM LACTATE, POTASSIUM CHLORIDE, CALCIUM CHLORIDE 600; 310; 30; 20 MG/100ML; MG/100ML; MG/100ML; MG/100ML
INJECTION, SOLUTION INTRAVENOUS CONTINUOUS PRN
Status: DISCONTINUED | OUTPATIENT
Start: 2024-08-15 | End: 2024-08-15

## 2024-08-15 RX ORDER — BUPIVACAINE HYDROCHLORIDE AND EPINEPHRINE 2.5; 5 MG/ML; UG/ML
INJECTION, SOLUTION EPIDURAL; INFILTRATION; INTRACAUDAL; PERINEURAL PRN
Status: DISCONTINUED | OUTPATIENT
Start: 2024-08-15 | End: 2024-08-15 | Stop reason: HOSPADM

## 2024-08-15 RX ORDER — ACETAMINOPHEN 325 MG/10.15ML
650 LIQUID ORAL EVERY 6 HOURS
Status: DISCONTINUED | OUTPATIENT
Start: 2024-08-15 | End: 2024-08-19 | Stop reason: HOSPADM

## 2024-08-15 RX ORDER — CEFAZOLIN SODIUM 1 G/3ML
INJECTION, POWDER, FOR SOLUTION INTRAMUSCULAR; INTRAVENOUS PRN
Status: DISCONTINUED | OUTPATIENT
Start: 2024-08-15 | End: 2024-08-15

## 2024-08-15 RX ORDER — NALOXONE HYDROCHLORIDE 0.4 MG/ML
0.1 INJECTION, SOLUTION INTRAMUSCULAR; INTRAVENOUS; SUBCUTANEOUS
Status: DISCONTINUED | OUTPATIENT
Start: 2024-08-15 | End: 2024-08-15 | Stop reason: HOSPADM

## 2024-08-15 RX ORDER — ONDANSETRON 4 MG/1
4 TABLET, ORALLY DISINTEGRATING ORAL EVERY 30 MIN PRN
Status: DISCONTINUED | OUTPATIENT
Start: 2024-08-15 | End: 2024-08-15 | Stop reason: HOSPADM

## 2024-08-15 RX ORDER — PROPOFOL 10 MG/ML
INJECTION, EMULSION INTRAVENOUS PRN
Status: DISCONTINUED | OUTPATIENT
Start: 2024-08-15 | End: 2024-08-15

## 2024-08-15 RX ADMIN — PAROXETINE HYDROCHLORIDE 20 MG: 20 TABLET, FILM COATED ORAL at 08:17

## 2024-08-15 RX ADMIN — CEFAZOLIN 2 G: 1 INJECTION, POWDER, FOR SOLUTION INTRAMUSCULAR; INTRAVENOUS at 16:00

## 2024-08-15 RX ADMIN — DILTIAZEM HYDROCHLORIDE 240 MG: 240 CAPSULE, COATED, EXTENDED RELEASE ORAL at 08:17

## 2024-08-15 RX ADMIN — DEXAMETHASONE SODIUM PHOSPHATE 8 MG: 4 INJECTION, SOLUTION INTRA-ARTICULAR; INTRALESIONAL; INTRAMUSCULAR; INTRAVENOUS; SOFT TISSUE at 16:00

## 2024-08-15 RX ADMIN — METRONIDAZOLE 500 MG: 500 INJECTION, SOLUTION INTRAVENOUS at 16:09

## 2024-08-15 RX ADMIN — ROCURONIUM BROMIDE 50 MG: 50 INJECTION, SOLUTION INTRAVENOUS at 16:00

## 2024-08-15 RX ADMIN — BENZONATATE 100 MG: 100 CAPSULE ORAL at 11:14

## 2024-08-15 RX ADMIN — LEVOTHYROXINE SODIUM 112 MCG: 0.11 TABLET ORAL at 08:17

## 2024-08-15 RX ADMIN — FENTANYL CITRATE 50 MCG: 0.05 INJECTION, SOLUTION INTRAMUSCULAR; INTRAVENOUS at 18:46

## 2024-08-15 RX ADMIN — METOPROLOL TARTRATE 37.5 MG: 25 TABLET, FILM COATED ORAL at 08:17

## 2024-08-15 RX ADMIN — HYDRALAZINE HYDROCHLORIDE 10 MG: 20 INJECTION INTRAMUSCULAR; INTRAVENOUS at 18:53

## 2024-08-15 RX ADMIN — HYDROMORPHONE HYDROCHLORIDE 0.4 MG: 0.2 INJECTION, SOLUTION INTRAMUSCULAR; INTRAVENOUS; SUBCUTANEOUS at 19:11

## 2024-08-15 RX ADMIN — METHYLPREDNISOLONE SODIUM SUCCINATE 40 MG: 40 INJECTION INTRAMUSCULAR; INTRAVENOUS at 22:41

## 2024-08-15 RX ADMIN — SODIUM CHLORIDE, POTASSIUM CHLORIDE, SODIUM LACTATE AND CALCIUM CHLORIDE: 600; 310; 30; 20 INJECTION, SOLUTION INTRAVENOUS at 18:27

## 2024-08-15 RX ADMIN — ACETAMINOPHEN 650 MG: 325 TABLET, FILM COATED ORAL at 11:24

## 2024-08-15 RX ADMIN — BENZONATATE 100 MG: 100 CAPSULE ORAL at 22:23

## 2024-08-15 RX ADMIN — ATORVASTATIN CALCIUM 20 MG: 20 TABLET, FILM COATED ORAL at 08:17

## 2024-08-15 RX ADMIN — SODIUM CHLORIDE SOLN NEBU 3% 4 ML: 3 NEBU SOLN at 11:39

## 2024-08-15 RX ADMIN — UMECLIDINIUM 1 PUFF: 62.5 AEROSOL, POWDER ORAL at 11:15

## 2024-08-15 RX ADMIN — FENTANYL CITRATE 50 MCG: 50 INJECTION INTRAMUSCULAR; INTRAVENOUS at 18:30

## 2024-08-15 RX ADMIN — PHENYLEPHRINE HYDROCHLORIDE 100 MCG: 10 INJECTION INTRAVENOUS at 16:00

## 2024-08-15 RX ADMIN — PROPOFOL 150 MG: 10 INJECTION, EMULSION INTRAVENOUS at 16:00

## 2024-08-15 RX ADMIN — FENTANYL CITRATE 100 MCG: 50 INJECTION INTRAMUSCULAR; INTRAVENOUS at 16:00

## 2024-08-15 RX ADMIN — METHYLPREDNISOLONE SODIUM SUCCINATE 40 MG: 40 INJECTION INTRAMUSCULAR; INTRAVENOUS at 11:14

## 2024-08-15 RX ADMIN — HYDROCODONE BITARTRATE AND ACETAMINOPHEN 500 ML: 500; 5 TABLET ORAL at 17:40

## 2024-08-15 RX ADMIN — LORAZEPAM 0.5 MG: 0.5 TABLET ORAL at 08:17

## 2024-08-15 RX ADMIN — ONDANSETRON 4 MG: 2 INJECTION INTRAMUSCULAR; INTRAVENOUS at 17:37

## 2024-08-15 RX ADMIN — IPRATROPIUM BROMIDE AND ALBUTEROL SULFATE 3 ML: .5; 3 SOLUTION RESPIRATORY (INHALATION) at 15:39

## 2024-08-15 RX ADMIN — METOPROLOL TARTRATE 37.5 MG: 25 TABLET, FILM COATED ORAL at 22:22

## 2024-08-15 RX ADMIN — HYDROMORPHONE HYDROCHLORIDE 0.4 MG: 0.2 INJECTION, SOLUTION INTRAMUSCULAR; INTRAVENOUS; SUBCUTANEOUS at 19:33

## 2024-08-15 RX ADMIN — Medication 30 ML: at 16:20

## 2024-08-15 RX ADMIN — LABETALOL 20 MG/4 ML (5 MG/ML) INTRAVENOUS SYRINGE 10 MG: at 17:00

## 2024-08-15 RX ADMIN — FENTANYL CITRATE 50 MCG: 50 INJECTION INTRAMUSCULAR; INTRAVENOUS at 18:40

## 2024-08-15 RX ADMIN — SUGAMMADEX 200 MG: 100 INJECTION, SOLUTION INTRAVENOUS at 18:10

## 2024-08-15 RX ADMIN — LEVALBUTEROL HYDROCHLORIDE 0.63 MG: 0.63 SOLUTION RESPIRATORY (INHALATION) at 11:39

## 2024-08-15 RX ADMIN — FENTANYL CITRATE 50 MCG: 0.05 INJECTION, SOLUTION INTRAMUSCULAR; INTRAVENOUS at 19:00

## 2024-08-15 RX ADMIN — ACETAMINOPHEN 650 MG: 325 TABLET, FILM COATED ORAL at 22:23

## 2024-08-15 RX ADMIN — LIDOCAINE HYDROCHLORIDE 50 MG: 20 INJECTION, SOLUTION INFILTRATION; PERINEURAL at 16:00

## 2024-08-15 RX ADMIN — ROCURONIUM BROMIDE 30 MG: 50 INJECTION, SOLUTION INTRAVENOUS at 17:09

## 2024-08-15 RX ADMIN — BENZONATATE 100 MG: 100 CAPSULE ORAL at 04:58

## 2024-08-15 RX ADMIN — LABETALOL 20 MG/4 ML (5 MG/ML) INTRAVENOUS SYRINGE 10 MG: at 18:21

## 2024-08-15 RX ADMIN — FENTANYL CITRATE 50 MCG: 50 INJECTION INTRAMUSCULAR; INTRAVENOUS at 16:20

## 2024-08-15 RX ADMIN — SODIUM CHLORIDE, POTASSIUM CHLORIDE, SODIUM LACTATE AND CALCIUM CHLORIDE: 600; 310; 30; 20 INJECTION, SOLUTION INTRAVENOUS at 15:30

## 2024-08-15 RX ADMIN — METHOCARBAMOL 1000 MG: 500 TABLET ORAL at 22:24

## 2024-08-15 RX ADMIN — METHYLPREDNISOLONE SODIUM SUCCINATE 40 MG: 40 INJECTION INTRAMUSCULAR; INTRAVENOUS at 04:58

## 2024-08-15 ASSESSMENT — ACTIVITIES OF DAILY LIVING (ADL)
ADLS_ACUITY_SCORE: 32
ADLS_ACUITY_SCORE: 33
ADLS_ACUITY_SCORE: 33
ADLS_ACUITY_SCORE: 32
ADLS_ACUITY_SCORE: 33
ADLS_ACUITY_SCORE: 32
ADLS_ACUITY_SCORE: 33
ADLS_ACUITY_SCORE: 32
ADLS_ACUITY_SCORE: 33
ADLS_ACUITY_SCORE: 32
ADLS_ACUITY_SCORE: 32

## 2024-08-15 ASSESSMENT — ENCOUNTER SYMPTOMS: DYSRHYTHMIAS: 1

## 2024-08-15 ASSESSMENT — COPD QUESTIONNAIRES
CAT_SEVERITY: SEVERE
COPD: 1

## 2024-08-15 NOTE — ANESTHESIA PROCEDURE NOTES
Airway       Patient location during procedure: OR       Procedure Start/Stop Times: 8/15/2024 4:00 PM  Staff -        CRNA: Jc Leonard APRN CRNA       Performed By: CRNA  Consent for Airway        Urgency: elective  Indications and Patient Condition       Indications for airway management: analia-procedural       Induction type:intravenous       Mask difficulty assessment: 1 - vent by mask    Final Airway Details       Final airway type: endotracheal airway       Successful airway: ETT - single  Endotracheal Airway Details        ETT size (mm): 7.0       Cuffed: yes       Successful intubation technique: video laryngoscopy       VL Blade Size: Glidescope 3       Grade View of Cords: 1       Adjucts: stylet       Position: Right       Measured from: lips       Secured at (cm): 21    Post intubation assessment        Placement verified by: capnometry, equal breath sounds and chest rise        Number of attempts at approach: 1       Number of other approaches attempted: 0       Secured with: tape       Ease of procedure: easy       Dentition: Intact and Unchanged    Medication(s) Administered   Medication Administration Time: 8/15/2024 4:00 PM

## 2024-08-15 NOTE — ANESTHESIA PREPROCEDURE EVALUATION
Anesthesia Pre-Procedure Evaluation    Patient: Lara Melendez   MRN: [id number] : 1965        Procedure : Procedure(s):  Stoma revision and hernia repair, possible exploratory laparotomy          Past Medical History:   Diagnosis Date    Anxiety     COPD (chronic obstructive pulmonary disease)     Diverticulitis of colon     Hypercholesterolemia     Hypertension     Hypothyroidism     Infection due to 2019 novel coronavirus 2022    Paroxysmal atrial fibrillation     Psoriasis     Pulmonary nodule - left upper lobe       Past Surgical History:   Procedure Laterality Date    CHOLECYSTECTOMY      COLOSTOMY N/A 2023    Procedure: OPENING DIVERTING COLOSTOMY;  Surgeon: Jaspal Rashid MD;  Location: [facility] OR    INCISION AND DRAINAGE MANDIBLE, COMBINED Left 01/10/2022    Procedure: INCISION AND DRAINAGE, MANDIBLE;  Surgeon: Jn Feliz DDS;  Location: [facility] OR    INCISION AND DRAINAGE MANDIBLE, COMBINED Left 2022    Procedure: INCISION AND DRAINAGE, MANDIBLE;  Surgeon: Taylor Ortez DDS;  Location: [facility] OR    TOOTH EXTRACTION      Vocal Cord surgery        Allergies   Allergen Reactions    Sulfa Antibiotics     Doxycycline Other (See Comments)     Develops chest tightness  Intolerance not allergy    Penicillins Rash     Generalized rash  Rash, Generalized        Social History     Tobacco Use    Smoking status: Never    Smokeless tobacco: Never   Substance Use Topics    Alcohol use: Yes     Comment: occ      Wt Readings from Last 1 Encounters:   24 67.4 kg (148 lb 9.4 oz)        Anesthesia Evaluation            ROS/MED HX  ENT/Pulmonary: Comment: Severe COPD exacerbation in the setting of COVID pneumonia- patient receiving oral steroids, remdesivir and O2 via high flow nasal cannula, requiring BiPAP at times    Previous tracheostomy    (+)                         severe,  COPD, O2 dependent,   recent URI,  COVID pneumonia:        Neurologic:       Cardiovascular:     (+)  hypertension- -    -  - -                        dysrhythmias, a-fib,             METS/Exercise Tolerance:     Hematologic:       Musculoskeletal:       GI/Hepatic: Comment: Stoma dehiscense      Renal/Genitourinary:       Endo:     (+)          thyroid problem,            Psychiatric/Substance Use:       Infectious Disease:       Malignancy:       Other:            Physical Exam    Airway        Mallampati: II   TM distance: > 3 FB   Neck ROM: full   Mouth opening: > 3 cm    Respiratory Devices and Support         Dental           Cardiovascular   cardiovascular exam normal          Pulmonary   pulmonary exam normal            Other findings: Easy intubation per note in January 2024    OUTSIDE LABS:  CBC:   Lab Results   Component Value Date    WBC 7.8 08/15/2024    WBC 8.8 08/14/2024    HGB 11.6 (L) 08/15/2024    HGB 10.7 (L) 08/14/2024    HCT 37.5 08/15/2024    HCT 34.3 (L) 08/14/2024     08/15/2024     08/14/2024     BMP:   Lab Results   Component Value Date     08/15/2024     08/14/2024    POTASSIUM 3.9 08/15/2024    POTASSIUM 3.6 08/14/2024    CHLORIDE 103 08/15/2024    CHLORIDE 102 08/14/2024    CO2 24 08/15/2024    CO2 28 08/14/2024    BUN 19.7 08/15/2024    BUN 21.8 08/14/2024    CR 0.59 08/15/2024    CR 0.68 08/14/2024     (H) 08/15/2024     (H) 08/14/2024     COAGS:   Lab Results   Component Value Date    PTT 25 03/26/2023    INR 1.04 06/21/2023     POC:   Lab Results   Component Value Date     (H) 01/30/2006    HCG Negative 01/29/2006     HEPATIC:   Lab Results   Component Value Date    ALBUMIN 3.9 08/10/2024    PROTTOTAL 7.2 08/10/2024    ALT 32 08/10/2024    AST 17 08/10/2024    ALKPHOS 87 08/10/2024    BILITOTAL 0.2 08/10/2024     OTHER:   Lab Results   Component Value Date    PH 7.36 08/09/2024    LACT 1.5 08/08/2024    A1C 5.5 07/21/2023    EDIN 8.6 (L) 08/15/2024    PHOS 3.1 02/14/2024    MAG 2.1 02/14/2024    TSH 0.31 07/02/2023    CRP <2.9 05/17/2022       Anesthesia  Plan    ASA Status:  4       Anesthesia Type: General.     - Airway: ETT   Induction: Intravenous.   Maintenance: Balanced.        Consents    Anesthesia Plan(s) and associated risks, benefits, and realistic alternatives discussed. Questions answered and patient/representative(s) expressed understanding.     - Discussed:     - Discussed with:  Patient            Postoperative Care    Pain management: IV analgesics, Oral pain medications, Multi-modal analgesia.   PONV prophylaxis: Ondansetron (or other 5HT-3), Dexamethasone or Solumedrol     Comments:    Other Comments: High risk for respiratory complications including prolonged intubation and ICU admission           Debra Hernandes MD    I have reviewed the pertinent notes and labs in the chart from the past 30 days and (re)examined the patient.  Any updates or changes from those notes are reflected in this note.

## 2024-08-15 NOTE — ANESTHESIA CARE TRANSFER NOTE
Patient: Lara Melendez    Procedure: Procedure(s):  Stoma revision and hernia repair, exploratory laparotomy, small bowel resection       Diagnosis: Intestinal stoma prolapse (H) [K94.19]  Diagnosis Additional Information: No value filed.    Anesthesia Type:   General     Note:    Oropharynx: oropharynx clear of all foreign objects and spontaneously breathing  Level of Consciousness: awake  Oxygen Supplementation: face mask  Level of Supplemental Oxygen (L/min / FiO2): 8  Independent Airway: airway patency satisfactory and stable  Dentition: dentition unchanged  Vital Signs Stable: post-procedure vital signs reviewed and stable  Report to RN Given: handoff report given  Patient transferred to: PACU    Handoff Report: Identifed the Patient, Identified the Reponsible Provider, Reviewed the pertinent medical history, Discussed the surgical course, Reviewed Intra-OP anesthesia mangement and issues during anesthesia, Set expectations for post-procedure period and Allowed opportunity for questions and acknowledgement of understanding  Vitals:  Vitals Value Taken Time   /101 08/15/24 1840   Temp 97.7  F (36.5  C) 08/15/24 1835   Pulse 72 08/15/24 1840   Resp 12 08/15/24 1840   SpO2 100 % 08/15/24 1835       Electronically Signed By: ZACHARY Blum CRNA  August 15, 2024  6:46 PM

## 2024-08-15 NOTE — ANESTHESIA POSTPROCEDURE EVALUATION
Patient: Lara Melendez    Procedure: Procedure(s):  Stoma revision and hernia repair, exploratory laparotomy, small bowel resection       Anesthesia Type:  General    Note:  Disposition: Inpatient   Postop Pain Control: Uneventful            Sign Out: Well controlled pain   PONV: No   Neuro/Psych: Uneventful            Sign Out: Acceptable/Baseline neuro status   Airway/Respiratory: Uneventful            Sign Out: Acceptable/Baseline resp. status   CV/Hemodynamics: Uneventful            Sign Out: Acceptable CV status; No obvious hypovolemia; No obvious fluid overload   Other NRE: NONE   DID A NON-ROUTINE EVENT OCCUR? No           Last vitals:  Vitals Value Taken Time   /99 08/15/24 1845   Temp 97.7  F (36.5  C) 08/15/24 1835   Pulse 69 08/15/24 1850   Resp 14 08/15/24 1850   SpO2 100 % 08/15/24 1850   Vitals shown include unfiled device data.    Electronically Signed By: Aftab High MD  August 15, 2024  6:51 PM

## 2024-08-15 NOTE — PROGRESS NOTES
[facility]    Medicine Progress Note - Hospitalist Service    Date of Admission:  8/8/2024    Assessment & Plan   Lara Melendez is a 58 year old female with PMH of severe COPD on 2-3L O2 requiring previous tracheostomy, paroxysmal Afib on eliquis, HTN, HLD, hypothyroidism, anemia, MDD, anxiety, and large bowel obstruction requiring ostomy placement, who was admitted on 8/8/2024 with acute on chronic hypoxemic and hypercapnic respiratory failure 2/2 COPD exacerbation and COVID infection.       COVID-19 infection  Acute on chronic hypoxemic and hypercapnic respiratory failure  Severe COPD with acute exacerbation  She has severe COPD at baseline and follows closely with pulmonology.  She is on numerous inhalers at baseline.  She has been receiving prednisone to try to optimize her lung function prior to having an ostomy takedown and she was taking 10 mg daily although took 30 mg this morning.  For the last 24 to 48 hours she has had headache, fatigue, and progressively worsening shortness of breath with wheezing despite using nebulizer treatments.  In the ER she was in respiratory distress and received further nebs, magnesium, and 10 mg IV dexamethasone after testing positive for COVID-19 PCR.  Chest x-ray does not show any infiltrate.  She was placed on BiPAP for her COPD exacerbation and is feeling better.  She does not have a leukocytosis, fever, or elevated procalcitonin/lactic acid.  She has had COVID-19 before and is vaccinated.  She did require intubation in January for COPD and also required a tracheostomy a little over a year ago that has since been decannulated.  She does use 3 L oxygen at home and chronically has a mildly elevated CO2 on previous blood gases as she does now with pH 7.33, pCO2 51, pO2 66, bicarb 27.  Significantly improved with BiPAP. Last ABG 7.36/47/110/26. Tolerated HFNC 8/10 but put back on BiPAP for sleep per patient preference.   -IV dexamethasone 6 mg every 24  "hours, completed 5 days  -IV Remdesivir day 4/5  -continue 3% saline nebs twice daily, albuterol nebs as needed  -HFNC while awake, wean as able  -BiPAP when sleeping, consider BiPAP on discharge  -Acetaminophen as needed for headache and fevers  -Continue PTA azithromycin 250 mg every MWF  change from DuoNebs 4 times daily to Xopenex and add Spiriva  Plan to start prednisone taper at 40 mg daily on 8/13     MDD  Anxiety  Having significant anxiety 2/2 dyspnea. PTA on PRN clonazepam, which she usually takes ~1x/day.   - Resume PTA paroxetine   - PRN ativan 0.5mg q4h PRN      Paroxysmal A-fib  HTN  EKG shows NSR.    -- Continue Eliquis, metoprolol, and diltiazem  Prn hydralazine for elevated BP     HLD:   -- continue PTA atorvastatin.     Hypothyroidism:   -- continue PTA levothyroxine.     Chronic anemia:   -- Hemoglobin at baseline of 10-11.     Colostomy: Previous large bowel obstruction requiring diverting colostomy.  As above there were plans to have a takedown surgery once lung status was optimized, however this was canceled.  -WOC RN consulted for colostomy and recommends CRS involvement. I placed a consult request.         Diet: NPO for Medical/Clinical Reasons Except for: Meds    DVT Prophylaxis: DOAC  Hines Catheter: Not present  Lines: None     Cardiac Monitoring: ACTIVE order. Indication: ICU  Code Status: Full Code      Clinically Significant Risk Factors                  # Hypertension: Noted on problem list           # Overweight: Estimated body mass index is 25.91 kg/m  as calculated from the following:    Height as of this encounter: 1.613 m (5' 3.5\").    Weight as of this encounter: 67.4 kg (148 lb 9.4 oz).      # COPD: noted on problem list              Disposition Plan     Medically Ready for Discharge: Anticipated in 2-4 Days             Geovani Baker MD  Hospitalist Service  [facility]  Securely message with [employer] (more info)  Text page via [employer] Paging/Directory "   ______________________________________________________________________    Interval History   She is better respiratory wise, not symptomatic at the moment.Frequent dry cough. Bowels massively protruding in colostomy bag. CR surgery today     Physical Exam   Vital Signs: Temp: 97.7  F (36.5  C) Temp src: Oral BP: (!) 155/79 Pulse: 57   Resp: 18 SpO2: 94 % O2 Device: High Flow Nasal Cannula (HFNC) Oxygen Delivery: 30 LPM  Weight: 148 lbs 9.44 oz    GEN:  Alert, oriented x 3, appears comfortable, NAD.  HEENT:  Normocephalic/atraumatic, no scleral icterus, no nasal discharge, mouth moist.  CV:  Regular rate and rhythm, no murmur or JVD.  S1 + S2 noted, no S3 or S4.  LUNGS: Coarse sounds, diminished to auscultation bilaterally with rhonchi/wheezing/crackles.  Symmetric chest rise on inhalation noted.  ABD:  Active bowel sounds, soft, non-tender/non-distended.  No rebound/guarding/rigidity.  EXT:  No edema or cyanosis.  No joint synovitis noted.  SKIN:  Dry to touch, no exanthems noted in the visualized areas.         Medical Decision Making       42 MINUTES SPENT BY ME on the date of service doing chart review, history, exam, documentation & further activities per the note.      Data     I have personally reviewed the following data over the past 24 hrs:    7.8  \   11.6 (L)   / 204     139 103 19.7 /  146 (H)   3.9 24 0.59 \       Imaging results reviewed over the past 24 hrs:   Recent Results (from the past 24 hour(s))   CT Abdomen Pelvis w Contrast    Narrative    EXAM: CT ABDOMEN PELVIS W CONTRAST  LOCATION: [facility]  DATE: 8/14/2024    INDICATION: STOMA PROLAPASE  COMPARISON: CT abdomen/pelvis with and without contrast 07/11/2023  TECHNIQUE: CT scan of the abdomen and pelvis was performed following injection of IV contrast. Multiplanar reformats were obtained. Dose reduction techniques were used.  CONTRAST: 74mL Isovue 370    FINDINGS:   LOWER CHEST: Mild scattered tree-in-bud nodular  opacities of the lung bases favoring mild pneumonitis.    HEPATOBILIARY: Hepatic steatosis. Cholecystectomy.    PANCREAS: Pancreas is somewhat atrophic, otherwise unremarkable.    SPLEEN: Normal.    ADRENAL GLANDS: Normal.    KIDNEYS/BLADDER: No hydronephrosis. Diffusely thickened urinary bladder.    BOWEL: Colonic diverticulosis. Prior diverting loop transverse colostomy. Moderate to large parastomal hernia containing fat, mesenteric vessels, and a nonobstructed loop of small bowel. No bowel obstruction.    LYMPH NODES: Normal.    VASCULATURE: Atherosclerotic calcifications of the aortoiliac vessels without evidence of aneurysmal dilatation.    PELVIC ORGANS: Unremarkable.    MUSCULOSKELETAL: As above. No acute osseous abnormality.      Impression    IMPRESSION:   1.  Prior diverting loop transverse colostomy. Moderate to large parastomal hernia containing fat, mesenteric vessels, and a nonobstructed loop of small bowel.  2.  Diffusely thickened urinary bladder, cystitis is possible and correlation with urinalysis is recommended.  3.  Hepatic steatosis.  4.  Mild scattered tree-in-bud nodular opacities of the lung bases favoring mild pneumonitis.

## 2024-08-15 NOTE — OP NOTE
DATE OF SURGERY: 08/15/24    PREOPERATIVE DIAGNOSIS: Colostomy prolapse, parastomal hernia, mucocutaneous junction separation      POSTOPERATIVE DIAGNOSIS: Same     OPERATION PERFORMED:   1. Exploratory laparotomy  2. Large and small bowel resection   3. Parastomal hernia repair  4. Colostomy resiting    SURGEON: Eileen Cason MD      ASSISTANT: Toy Forbes PA-C     ANESTHESIA: General.      EBL: 40 ml    SPECIMEN: (1) colostomy trim for pathology, (2) small bowel for pathology    COMPLICATIONS: none      OPERATIVE FINDINGS:         Parastomal hernia with prolapse primarily from the efferent limb with twisting of the mesentery due to scar band resulting in significant edema; defect in MCJ at the inferomedial aspect. The small bowel was densely adhered to the fascia and the colon in this region requiring resection with anastomosis of an approximately 2 cm segment.     INDICATION: Lara Melendez is a 58 year old woman with COPD on home O2 and previously requiring tracheostomy and history of loop transverse colostomy in July 2023 for colonic inertia and concern for sigmoid stricture who was admitted with COPD exacerbation and COVID-19 infection who was found to have significant parastomal hernia with prolapse and separation of the mucocutaneous junction. The risks and benefits of proceeding with a sigmoid colectomy had been discussed with the patient and their family and they agreed to proceed with surgery.     PROCEDURE: After informed consent was obtained, the patient was brought to the operating room and placed on the operating room table in the supine position. Sequential compression devices were placed on bilateral lower extremities prior to induction of general anesthesia. General anesthesia was induced without difficulty.  A el catheter was placed by the circulating nurse. IV antibiotics were administered prior to incision. DVT prophylaxis was not indicated given that the patient had received  therapeutic anticoagulation. The abdomen was then sterilely prepped and draped in standard fashion. A formal time out was performed.      Manual reduction of the parastomal hernia was limited due to edema. There was a defect between the mucocutaneous junction in the left inferolateral position. As reduction was not possible, we began by incising the mucocutaneous junction with cautery circumferentially in order to reduce the hernia though the abdominal cavity. This was carried down through the subcutaneous tissue. The abdomen was entered superiorly as there were limited adhesions here and the planes were most native. This allowed for partial reduction of the hernia and prolapse though it was tethered by a thick scar band which was also encircling densly scarred small bowel adherent to the fascia inferiorly. The band was lysed. The small bowel was evaluated and was inseparable from fascia and the edematous colon. Sharp dissection was used though a serosal injury was unavoidable and the bowel was encased in thick scar. A stitch was placed for evaluation again at the end of the procedure and the small bowel returned to the abdominal cavity to allow for ongoing inspection and reduction of the colon. With ongoing sharp dissection and cautery, the hernia was reduced and the large bowel freed. There was significant prolapse most notable from the efferent limb. The anatomy was restored and a window created in the mesentery at a point of planned transsection. The proximal colon was also restored to a normal anatomy and adhesions taken down between the colon & the omentum. A point was selected and a window made in the mesentery for division. Both transsection points were divided with a 100 blue ALFA stapler. The mesentery was then divided with the LigaSure. The specimen was passed off of the field.     Using the hernia defect, I then did a sweep of the visible regions of the abdominal cavity. There were minimal adhesions.  There was no bleeding from the mesentery. Some additional omentum was divided from the transverse colon to allow for the proximal extent to come to the right upper quadrant.  I was able to clearly palpate the rectus muscle. A point on the skin was marked out within this structure for stoma resiting with plan for a separate colostomy on the right and mucous fistula on the left. The rectus was then palpated on the left and a point for the mucous fistula within the rectus muscle and marked. Both sides of the colon reached easily to these planned stomal sites.     The fascia was then identified from the hernia defect and cleared from adhesive disease.     Next, the small bowel in question was extracorporealized and examined. I carefully evaluated the bowel and it was clear that this did require resection of approximately 2-3 cm. We prepared for a Rufino-style anastomosis. The mesentery was divided over the planned segment of transection. And enterotomy was created. The 100 mm blue load ALFA stapler was placed and fired after bringing together the antimesenteric bowel. The staple line was hemostatic. The common channel was closed with a 100 mm blue load ALFA stapler. A crotch stitch was placed with Vicryl. The corners were reinforced with Vicryl. There was some bleeding from the staple line and therefore I oversewed the staple line with Vicryl which was effective. The mesenteric defect was closed. The bowel was returned to the abdominal cavity.      Next, I created a trephine for both the colostomy and mucous fistula in the pre-determined locations. The skin was incised with cautery. The subcutaneous tissue was divided with electrocautery. The anterior fascia was incised. The muscle was divided the posterior fascia was opened with cautery. The bowel was brought out through the trephine. For the colostomy on the right, this was brought all of the way to the skin. For the mucous fistula, the full staple line was brought  above the fascia and a corner easily reaching the skin. These were secured.     Next, the abdominal cavity was again inspected and fully hemostatic. The fascia was cleared of hernia sac and overlying tissue. Non-looped 1 PDS was used in figure of eight fashion to close the fascia without tension. The wound was irrigated. Next a penrose was placed at the base of the wound. The subcutaneous tissue was closed with 2-0 Vicryl in interrupted fascia around the penrose. A 4-0 Monocryl was then used to close the skin with the penrose exiting from both the superior and inferior aspects to keep the wound open and allow for drainage. The penrose was then secured to itself with Vicryl.     Finally, the colostomy was matured in standard brooked fashion in the right upper quadrant with 3-0 Vicryl. For the tip of the mucous fistula, the tip of the staple line was opened and this was matured flush with the skin with 3-0 Vicryl. Both the colostomy and mucous fistula were probed directly down under the fascia without narrowing. A stoma appliance as applied to the colostomy and gauze to the midline as well as the mucous fistula site. The el catheter was removed.     The patient tolerated this procedure well without complications.  At the end of the procedure all sponge, needle and instrument counts were correct. The patient was extubated in the operating room and brought to the recovery room in stable condition.      Eileen Cason MD

## 2024-08-15 NOTE — PROGRESS NOTES
"A BiPAP of  14/7 @ 25% was applied to the pt via the mask for an increase in WOB and/or SOB.  The bridge of the nose looks good and remains intact. Pt is tolerating it well. Will continue to monitor and assess the pt's current respiratory status and needs.    BP (!) 166/86 (BP Location: Left arm)   Pulse 58   Temp (!) 96.4  F (35.8  C) (Temporal)   Resp 18   Ht 1.613 m (5' 3.5\")   Wt 67.4 kg (148 lb 9.4 oz)   SpO2 94%   BMI 25.91 kg/m      Renny Gamboa, RT on 8/15/2024 at 4:31 AM      "

## 2024-08-15 NOTE — PROGRESS NOTES
[facility]    Medicine Progress Note - Hospitalist Service    Date of Admission:  8/8/2024    Assessment & Plan   Lara Melendez is a 58 year old female with PMH of severe COPD on 2-3L O2 requiring previous tracheostomy, paroxysmal Afib on eliquis, HTN, HLD, hypothyroidism, anemia, MDD, anxiety, and large bowel obstruction requiring ostomy placement, who was admitted on 8/8/2024 with acute on chronic hypoxemic and hypercapnic respiratory failure 2/2 COPD exacerbation and COVID infection.       COVID-19 infection  Acute on chronic hypoxemic and hypercapnic respiratory failure  Severe COPD with acute exacerbation  She has severe COPD at baseline and follows closely with pulmonology.  She is on numerous inhalers at baseline.  She has been receiving prednisone to try to optimize her lung function prior to having an ostomy takedown and she was taking 10 mg daily although took 30 mg this morning.  For the last 24 to 48 hours she has had headache, fatigue, and progressively worsening shortness of breath with wheezing despite using nebulizer treatments.  In the ER she was in respiratory distress and received further nebs, magnesium, and 10 mg IV dexamethasone after testing positive for COVID-19 PCR.  Chest x-ray does not show any infiltrate.  She was placed on BiPAP for her COPD exacerbation and is feeling better.  She does not have a leukocytosis, fever, or elevated procalcitonin/lactic acid.  She has had COVID-19 before and is vaccinated.  She did require intubation in January for COPD and also required a tracheostomy a little over a year ago that has since been decannulated.  She does use 3 L oxygen at home and chronically has a mildly elevated CO2 on previous blood gases as she does now with pH 7.33, pCO2 51, pO2 66, bicarb 27.  Significantly improved with BiPAP. Last ABG 7.36/47/110/26. Tolerated HFNC 8/10 but put back on BiPAP for sleep per patient preference.   -IV dexamethasone 6 mg every 24  "hours, completed 5 days  -IV Remdesivir day 4/5  -continue 3% saline nebs twice daily, albuterol nebs as needed  -HFNC while awake, wean as able  -BiPAP when sleeping, consider BiPAP on discharge  -Acetaminophen as needed for headache and fevers  -Continue PTA azithromycin 250 mg every MWF  change from DuoNebs 4 times daily to Xopenex and add Spiriva  Plan to start prednisone taper at 40 mg daily on 8/13     MDD  Anxiety  Having significant anxiety 2/2 dyspnea. PTA on PRN clonazepam, which she usually takes ~1x/day.   - Resume PTA paroxetine   - PRN ativan 0.5mg q4h PRN      Paroxysmal A-fib  HTN  EKG shows NSR.    -- Continue Eliquis, metoprolol, and diltiazem  Prn hydralazine for elevated BP     HLD:   -- continue PTA atorvastatin.     Hypothyroidism:   -- continue PTA levothyroxine.     Chronic anemia:   -- Hemoglobin at baseline of 10-11.     Colostomy: Previous large bowel obstruction requiring diverting colostomy.  As above there were plans to have a takedown surgery once lung status was optimized, however this was canceled.  -WOC RN consulted for colostomy and recommends CRS involvement. I placed a consult request.         Diet: NPO for Medical/Clinical Reasons Except for: Meds    DVT Prophylaxis: DOAC  Hines Catheter: Not present  Lines: None     Cardiac Monitoring: ACTIVE order. Indication: ICU  Code Status: Full Code      Clinically Significant Risk Factors                  # Hypertension: Noted on problem list           # Overweight: Estimated body mass index is 25.91 kg/m  as calculated from the following:    Height as of this encounter: 1.613 m (5' 3.5\").    Weight as of this encounter: 67.4 kg (148 lb 9.4 oz).      # COPD: noted on problem list              Disposition Plan     Medically Ready for Discharge: Anticipated in 2-4 Days             Geovani Baker MD  Hospitalist Service  [facility]  Securely message with [employer] (more info)  Text page via [employer] Paging/Directory "   ______________________________________________________________________    Interval History   She is having improvement but using BiPAP frequently due to significant short of breath, dropping oxygen saturation on exertion, not good oral intake, frequent dry cough. Bowels massively protruding in colostomy bag.     Physical Exam   Vital Signs: Temp: 98.1  F (36.7  C) Temp src: Temporal BP: (!) 151/86 Pulse: 67   Resp: 20 SpO2: 94 % O2 Device: BiPAP/CPAP Oxygen Delivery: (S) 20 LPM  Weight: 148 lbs 9.44 oz    GEN:  Alert, oriented x 3, appears comfortable, NAD.  HEENT:  Normocephalic/atraumatic, no scleral icterus, no nasal discharge, mouth moist.  CV:  Regular rate and rhythm, no murmur or JVD.  S1 + S2 noted, no S3 or S4.  LUNGS: Coarse sounds, diminished to auscultation bilaterally with rhonchi/wheezing/crackles.  Symmetric chest rise on inhalation noted.  ABD:  Active bowel sounds, soft, non-tender/non-distended.  No rebound/guarding/rigidity.  EXT:  No edema or cyanosis.  No joint synovitis noted.  SKIN:  Dry to touch, no exanthems noted in the visualized areas.         Medical Decision Making       40 MINUTES SPENT BY ME on the date of service doing chart review, history, exam, documentation & further activities per the note.      Data     I have personally reviewed the following data over the past 24 hrs:    8.8  \   10.7 (L)   / 203     140 102 21.8 /  102 (H)   3.6 28 0.68 \     Procal: N/A CRP: 39.77 (H) Lactic Acid: N/A         Imaging results reviewed over the past 24 hrs:   No results found for this or any previous visit (from the past 24 hour(s)).

## 2024-08-15 NOTE — PROGRESS NOTES
CRS Brief Update.    I saw Ewa Melendez today and have personally reviewed her chart including notes, laboratories and imaging. Ewa is a 58 year old woman with severe COPD previously requiring tracheostomy, pAfib and history of transverse loop colostomy for LBO who is currently admitted in the setting of COVID-19 infection and acute COPD exacerbation further complicated by dehiscence of her mucocutaneous junction of her stoma with extruded intra-abdominal contents and with stomal prolapse. This is very unusual timing for this, and may have been precipitated by significant coughing as well as ongoing steroid use. Additionally, her Eliquis was only held as of yesterday.     CT demonstrates a fairly small waste with stomal prolapse and large and small bowel within the defect.     Exam demonstrates the wound management system with good seal. 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 Abd soft. She is on HFNC and conversant.     I discussed with Ewa that the complete MJC separation and extrusion of contents does require surgical repair in a timely fashion. We reviewed the plan for stoma conversion to a divided loop versus re-siting with an end colostomy and mucus fistula based on the tissue quality. Both would require some form of primary hernia repair as well. I would not plan to place mesh given the infected field and also to limit surgical time given her acutely heightened risks. We did discuss that she is at exceptional risks for complication for surgery given her respiratory status and also the systemic repercussions associated with COVID. She has previously required a tracheostomy, and I did discuss that it is very possible that she require ongoing intubation at the end of the procedure. We discussed that this could even include prolonged intubation and/or tracheostomy. Her respiratory and cardiac risks make it very unfavorable to perform this surgery outside usual hours  given the decreased support in the hospital and this has been reviewed with the patient and the operating room. We also reviewed cardiovascular risks, increased risks of blood clots, increased risks of pneumonia. In light of her ongoing steroid use and habitus she is also at an increased risk of infection, recurrent prolapse, fascial dehiscence, hernia and non-healing wounds. She did have a question regarding reversal. We did review that a loop colostomy or divided loop or end colostomy with MF are all technically reversible procedures, but that the issue regarding reversal for her is and has been that reversal is an entirely elective procedure, and she would need to be stable enough from a respiratory and systemic standpoint for this. Thus far her underlying respiratory status has made it such that she is not a candidate for an elective procedure. For reversal we did review that she would also need to be stable enough first to undergo colonoscopy as well. In addition, she would have to be off steroids both before and after surgery. Her questions were fully answered. She was in agreement with proceeding with surgery.     Eileen Cason MD

## 2024-08-15 NOTE — BRIEF OP NOTE
[facility]    Brief Operative Note    Pre-operative diagnosis: Intestinal stoma prolapse (H) [K94.19]  Post-operative diagnosis Same as pre-operative diagnosis    Procedure: Stoma revision and hernia repair, exploratory laparotomy, small bowel resection, N/A - Abdomen    Surgeon: Surgeons and Role:     * Eileen Cason MD - Primary     * Toy Forbes PA-C - Assisting  Anesthesia: General   Estimated Blood Loss: 40 mL    Drains: Penrose drain in midline above fascia.     Specimens:   ID Type Source Tests Collected by Time Destination   1 : COLOSTOMY TRIM Tissue Large Intestine, Colon SURGICAL PATHOLOGY EXAM Eielen Cason MD 8/15/2024  4:38 PM    2 : SMALL BOWEL Tissue Small Intestine SURGICAL PATHOLOGY EXAM Eileen Cason MD 8/15/2024  5:01 PM      Findings:  Parastomal hernia with prolapse primarily from the efferent limb with twisting of the mesentery due to scar band resulting in significant edema; defect in MCJ at the inferomedial aspect. The small bowel was densely adhered to the fascia and the colon in this region requiring resection with anastomosis of an approximately 2 cm segment.     New end colostomy matured in the RUQ with mucous fistula in the LUQ.     Complications: None.  Implants: * No implants in log *    Post-operative plan:  - Hold Eliquis. OK to start DVT PPX tomorrow following labs (enox)  - CLD today, ok to ADAT to regular POD 1 pending clinical evaluation by CRS  - Hines removed at end of procedure  - No abx indicated from CRS perspective  - Penrose drain in prior stoma site above fascia to allow for ongoing drainage  - Ok to shower starting POD 1   - OOB x 4  - Additional cares per medicine team        ASQD ADDENDUM:    PATIENT DATA  Indicate Y or N:  Home O2 Yes  Hemodialysis No  Transplant patient No  Cirrhosis No  Steroids in last 30 days Yes  Immunomodulators in last 30 days No  Anticoagulation at time of surgery Yes   List medication Eliquis held  24 hrs  Prior abdominal surgery Yes  Pelvic irradiation No    Albumin within 30 days if known 3.0  Hgb within 30 days if known 11.6  Cr within 30 days if known 0.59  Body mass index is 25.91 kg/m .    OR DATA  Emergent Yes   <24 hours Yes   <1 week No  Bowel Prep (Y or N) No  Antibiotics (Y or N) Yes   DVT prophylaxis    Heparin No (still within 24 hrs Eliquis)   SCD Yes   None No  Drain No  ASA (1,2,3,4,5 if unknown) 4  OR time (min) 115  Stents No  Transfuse >/= 2U No  Anastomosis   Stapled Yes - small bowel   Handsewn No  Leak Test (pos, neg, not done) not done (small bowel)

## 2024-08-15 NOTE — PLAN OF CARE
"Goal Outcome Evaluation:      Plan of Care Reviewed With: patient    Overall Patient Progress: improvingOverall Patient Progress: improving    Patient A&Ox4. Continues to be either on high flow or Bi-pap. Denied pain, colorectal up to see patient due to protrusion from her stoma. Patient is now NPO, eliquis on hold for now incase of surgery. Solumedrol and tessalon pearls given tonight for cough. Patient had a CT scan done tonight. Up to bedside commode with assist of one. Will continue to follow plan of care/  Problem: Adult Inpatient Plan of Care  Goal: Plan of Care Review  Description: The Plan of Care Review/Shift note should be completed every shift.  The Outcome Evaluation is a brief statement about your assessment that the patient is improving, declining, or no change.  This information will be displayed automatically on your shift  note.  Outcome: Progressing  Flowsheets (Taken 8/15/2024 0005)  Plan of Care Reviewed With: patient  Overall Patient Progress: improving  Goal: Patient-Specific Goal (Individualized)  Description: You can add care plan individualizations to a care plan. Examples of Individualization might be:  \"Parent requests to be called daily at 9am for status\", \"I have a hard time hearing out of my right ear\", or \"Do not touch me to wake me up as it startles  me\".  Outcome: Progressing  Goal: Absence of Hospital-Acquired Illness or Injury  Outcome: Progressing  Intervention: Identify and Manage Fall Risk  Recent Flowsheet Documentation  Taken 8/14/2024 1753 by Lisandra Aj, RN  Safety Promotion/Fall Prevention:   activity supervised   assistive device/personal items within reach   clutter free environment maintained   nonskid shoes/slippers when out of bed  Intervention: Prevent Skin Injury  Recent Flowsheet Documentation  Taken 8/14/2024 1753 by Lisandra Aj, RN  Body Position: position changed independently  Device Skin Pressure Protection: tubing/devices free from skin " contact  Intervention: Prevent Infection  Recent Flowsheet Documentation  Taken 8/14/2024 1753 by Lisandra Aj RN  Infection Prevention:   personal protective equipment utilized   single patient room provided  Goal: Optimal Comfort and Wellbeing  Outcome: Progressing  Goal: Readiness for Transition of Care  Outcome: Progressing     Problem: Skin Injury Risk Increased  Goal: Skin Health and Integrity  Outcome: Progressing  Intervention: Plan: Nurse Driven Intervention: Moisture Management  Recent Flowsheet Documentation  Taken 8/14/2024 1753 by Lisandra Aj RN  Plan: Moisture Management: no brief in bed  Intervention: Optimize Skin Protection  Recent Flowsheet Documentation  Taken 8/14/2024 1753 by Lisandra Aj RN  Activity Management: activity adjusted per tolerance     Problem: Infection  Goal: Absence of Infection Signs and Symptoms  Outcome: Progressing  Intervention: Prevent or Manage Infection  Recent Flowsheet Documentation  Taken 8/14/2024 1753 by Lisandra Aj RN  Isolation Precautions: special precautions initiated     Problem: COPD (Chronic Obstructive Pulmonary Disease)  Goal: Optimal Chronic Illness Coping  Outcome: Progressing  Goal: Optimal Level of Functional [place]  Outcome: Progressing  Intervention: Optimize Functional Ability  Recent Flowsheet Documentation  Taken 8/14/2024 1753 by Lisandra Aj RN  Activity Management: activity adjusted per tolerance  Goal: Absence of Infection Signs and Symptoms  Outcome: Progressing  Intervention: Prevent or Manage Infection  Recent Flowsheet Documentation  Taken 8/14/2024 1753 by Lisandra Aj RN  Isolation Precautions: special precautions initiated  Goal: Improved Oral Intake  Outcome: Progressing  Goal: Effective Oxygenation and Ventilation  Outcome: Progressing  Intervention: Promote Airway Secretion Clearance  Recent Flowsheet Documentation  Taken 8/14/2024 1753 by Lisandra Aj RN  Activity Management:  activity adjusted per tolerance     Problem: Comorbidity Management  Goal: Maintenance of COPD Symptom Control  Outcome: Progressing  Intervention: Maintain COPD Symptom Control  Recent Flowsheet Documentation  Taken 8/14/2024 1753 by Lisandra Aj RN  Medication Review/Management: medications reviewed  Goal: Blood Pressure in Desired Range  Outcome: Progressing  Intervention: Maintain Blood Pressure Management  Recent Flowsheet Documentation  Taken 8/14/2024 1753 by Lisandra Aj, RN  Medication Review/Management: medications reviewed

## 2024-08-15 NOTE — PLAN OF CARE
Goal Outcome Evaluation:      Plan of Care Reviewed With: patient, spouse    Overall Patient Progress: no changeOverall Patient Progress: no change    Outcome Evaluation: npo today. surgery at 1450 today for exp lap, stoma revision. on high flow oxygen. +covid. diminished lungs, no wheezing heard. on tele. tylenol for HA. chg bath done. linen changed.    VS-stable. Afebrile,   Lung Sounds-diminished throughout. Using IS upto 750, congested nonprod loose cough, shallow breather. Receiving nebs, inhalers, solumedrol tid 40 mg. Tessalon pearls.   O2-on high flow oxygen. Cont pulse ox on. 93%   GI-+bs.+flatus. lbm was today thru ostomy 100 out. Stoma protruding, prolapsed. Going to surgery today. NPO since midnight. Took sips of water with meds.   -voiding bsc. Adequate.   IVF-sl iv. Solumedrol iv.   Dressings-none.   CMS-denies numbnesss and tingling. +pp, no swelling noted.   Drain-none.   Activity-up with sba. Bsc.   Pain-c/o HA  took tylenol at 1130. Rates it a 5. Feeling hungry.   D/C Plan-surgery today. Discharge pending. Colorectal, hospitalist. Woc, respiratory following.     Problem: Adult Inpatient Plan of Care  Goal: Plan of Care Review  Description: The Plan of Care Review/Shift note should be completed every shift.  The Outcome Evaluation is a brief statement about your assessment that the patient is improving, declining, or no change.  This information will be displayed automatically on your shift  note.  Outcome: Not Progressing  Flowsheets (Taken 8/15/2024 1127)  Outcome Evaluation: npo today. surgery at 1450 today for exp lap, stoma revision. on high flow oxygen. +covid. diminished lungs, no wheezing heard. on tele. tylenol for HA. chg bath done. linen changed.  Plan of Care Reviewed With:   patient   spouse  Overall Patient Progress: no change  Goal: Patient-Specific Goal (Individualized)  Description: You can add care plan individualizations to a care plan. Examples of Individualization might be:   "\"Parent requests to be called daily at 9am for status\", \"I have a hard time hearing out of my right ear\", or \"Do not touch me to wake me up as it startles  me\".  Outcome: Not Progressing  Goal: Absence of Hospital-Acquired Illness or Injury  Outcome: Not Progressing  Intervention: Identify and Manage Fall Risk  Recent Flowsheet Documentation  Taken 8/15/2024 0805 by Ayala Myrick RN  Safety Promotion/Fall Prevention:   activity supervised   clutter free environment maintained   nonskid shoes/slippers when out of bed   patient and family education   safety round/check completed  Intervention: Prevent Skin Injury  Recent Flowsheet Documentation  Taken 8/15/2024 0805 by Ayala Myrick RN  Body Position: position changed independently  Device Skin Pressure Protection:   tubing/devices free from skin contact   adhesive use limited  Intervention: Prevent Infection  Recent Flowsheet Documentation  Taken 8/15/2024 0805 by Ayala Myrick RN  Infection Prevention:   single patient room provided   personal protective equipment utilized  Goal: Optimal Comfort and Wellbeing  Outcome: Not Progressing  Intervention: Provide Person-Centered Care  Recent Flowsheet Documentation  Taken 8/15/2024 0805 by Ayala Myrick RN  Trust Relationship/Rapport: care explained  Goal: Readiness for Transition of Care  Outcome: Not Progressing     "

## 2024-08-15 NOTE — PLAN OF CARE
" Goal Outcome Evaluation:      Plan of Care Reviewed With: patient    Overall Patient Progress: improving    Outcome Evaluation: NPO overnight; colorectal consulted for ostomy, potential surg to repair; high flow this am after PO med admin; tele SB 58      Problem: Adult Inpatient Plan of Care  Goal: Plan of Care Review  Description: The Plan of Care Review/Shift note should be completed every shift.  The Outcome Evaluation is a brief statement about your assessment that the patient is improving, declining, or no change.  This information will be displayed automatically on your shift  note.  Outcome: Progressing  Flowsheets (Taken 8/15/2024 0654)  Outcome Evaluation:   NPO overnight   colorectal consulted for ostomy, potential surg to repair   high flow this am after PO med admin   tele SB 58  Plan of Care Reviewed With: patient  Overall Patient Progress: improving  Goal: Patient-Specific Goal (Individualized)  Description: You can add care plan individualizations to a care plan. Examples of Individualization might be:  \"Parent requests to be called daily at 9am for status\", \"I have a hard time hearing out of my right ear\", or \"Do not touch me to wake me up as it startles  me\".  8/15/2024 0654 by Leighton Keene, RN  Outcome: Progressing  Goal: Absence of Hospital-Acquired Illness or Injury  8/15/2024 0654 by Leighton Keene, RN  Outcome: Progressing  Intervention: Identify and Manage Fall Risk  Recent Flowsheet Documentation  Taken 8/15/2024 0115 by Leighton Keene, RN  Safety Promotion/Fall Prevention:   activity supervised   assistive device/personal items within reach   clutter free environment maintained   increased rounding and observation   increase visualization of patient   lighting adjusted   nonskid shoes/slippers when out of bed   patient and family education   room organization consistent   safety round/check completed   supervised activity  Intervention: Prevent Skin Injury  Recent Flowsheet " Documentation  Taken 8/15/2024 0115 by Leighton Keene RN  Body Position:   position changed independently   side-lying  Intervention: Prevent Infection  Recent Flowsheet Documentation  Taken 8/15/2024 0115 by Leighton Keene RN  Infection Prevention:   rest/sleep promoted   visitors restricted/screened   single patient room provided  Goal: Optimal Comfort and Wellbeing  8/15/2024 0654 by Leighton Keene RN  Outcome: Progressing  Intervention: Monitor Pain and Promote Comfort  Recent Flowsheet Documentation  Taken 8/15/2024 0115 by Leighton Keene RN  Pain Management Interventions:   care clustered   rest  Goal: Readiness for Transition of Care  8/15/2024 0654 by Leighton Keene RN  Outcome: Progressing     Problem: Skin Injury Risk Increased  Goal: Skin Health and Integrity  8/15/2024 0654 by Leighton Keene RN  Outcome: Progressing     Problem: Skin Injury Risk Increased  Goal: Skin Health and Integrity  Intervention: Optimize Skin Protection  Recent Flowsheet Documentation  Taken 8/15/2024 0115 by Leighton Keene RN  Activity Management: activity adjusted per tolerance  Head of Bed (HOB) Positioning: HOB at 30 degrees     Problem: Infection  Goal: Absence of Infection Signs and Symptoms  8/15/2024 0654 by Leighton Keene RN  Outcome: Progressing     Problem: Infection  Goal: Absence of Infection Signs and Symptoms  Intervention: Prevent or Manage Infection  Recent Flowsheet Documentation  Taken 8/15/2024 0115 by Leighton Keene RN  Isolation Precautions: special precautions maintained     Problem: COPD (Chronic Obstructive Pulmonary Disease)  Goal: Optimal Chronic Illness Coping  8/15/2024 0654 by Leighton Keene RN  Outcome: Progressing     Problem: COPD (Chronic Obstructive Pulmonary Disease)  Goal: Optimal Level of Functional [place]  8/15/2024 0654 by Leighton Keene RN  Outcome: Progressing     Problem: COPD (Chronic Obstructive Pulmonary Disease)  Goal: Optimal Level of Functional  [place]  Intervention: Optimize Functional Ability  Recent Flowsheet Documentation  Taken 8/15/2024 0115 by Leighton Keene RN  Activity Management: activity adjusted per tolerance     Problem: COPD (Chronic Obstructive Pulmonary Disease)  Goal: Absence of Infection Signs and Symptoms  8/15/2024 0654 by Leighton Keene RN  Outcome: Progressing     Problem: COPD (Chronic Obstructive Pulmonary Disease)  Goal: Absence of Infection Signs and Symptoms  Intervention: Prevent or Manage Infection  Recent Flowsheet Documentation  Taken 8/15/2024 0115 by Leighton Keene RN  Isolation Precautions: special precautions maintained     Problem: COPD (Chronic Obstructive Pulmonary Disease)  Goal: Improved Oral Intake  8/15/2024 0654 by Leighton Keene RN  Outcome: Progressing     Problem: COPD (Chronic Obstructive Pulmonary Disease)  Goal: Effective Oxygenation and Ventilation  8/15/2024 0654 by Leighton Keene RN  Outcome: Progressing     Problem: COPD (Chronic Obstructive Pulmonary Disease)  Goal: Effective Oxygenation and Ventilation  Intervention: Promote Airway Secretion Clearance  Recent Flowsheet Documentation  Taken 8/15/2024 0115 by Leighton Keene RN  Activity Management: activity adjusted per tolerance     Problem: COPD (Chronic Obstructive Pulmonary Disease)  Goal: Effective Oxygenation and Ventilation  Intervention: Optimize Oxygenation and Ventilation  Recent Flowsheet Documentation  Taken 8/15/2024 0115 by Leighton Keene RN  Head of Bed (HOB) Positioning: HOB at 30 degrees     Problem: Comorbidity Management  Goal: Maintenance of COPD Symptom Control  8/15/2024 0654 by Leighton Keene RN  Outcome: Progressing     Problem: Comorbidity Management  Goal: Maintenance of COPD Symptom Control  Intervention: Maintain COPD Symptom Control  Recent Flowsheet Documentation  Taken 8/15/2024 0115 by Leighton Keene RN  Medication Review/Management: medications reviewed     Problem: Comorbidity Management  Goal: Blood  Pressure in Desired Range  8/15/2024 0654 by Leighton Keene, RN  Outcome: Progressing     Problem: Comorbidity Management  Goal: Blood Pressure in Desired Range  Intervention: Maintain Blood Pressure Management  Recent Flowsheet Documentation  Taken 8/15/2024 0115 by Leighton Keene, RN  Medication Review/Management: medications reviewed

## 2024-08-16 LAB
ANION GAP SERPL CALCULATED.3IONS-SCNC: 12 MMOL/L (ref 7–15)
ANION GAP SERPL CALCULATED.3IONS-SCNC: 12 MMOL/L (ref 7–15)
BUN SERPL-MCNC: 22.9 MG/DL (ref 8–23)
CALCIUM SERPL-MCNC: 8.9 MG/DL (ref 8.8–10.4)
CHLORIDE SERPL-SCNC: 101 MMOL/L (ref 98–107)
CHLORIDE SERPL-SCNC: 101 MMOL/L (ref 98–107)
CREAT SERPL-MCNC: 0.68 MG/DL (ref 0.51–0.95)
EGFRCR SERPLBLD CKD-EPI 2021: >90 ML/MIN/1.73M2
ERYTHROCYTE [DISTWIDTH] IN BLOOD BY AUTOMATED COUNT: 14 % (ref 10–15)
GLUCOSE SERPL-MCNC: 141 MG/DL (ref 70–99)
HCO3 SERPL-SCNC: 26 MMOL/L (ref 22–29)
HCO3 SERPL-SCNC: 26 MMOL/L (ref 22–29)
HCT VFR BLD AUTO: 34.4 % (ref 35–47)
HGB BLD-MCNC: 11.2 G/DL (ref 11.7–15.7)
HOLD SPECIMEN: NORMAL
HOLD SPECIMEN: NORMAL
MCH RBC QN AUTO: 30 PG (ref 26.5–33)
MCHC RBC AUTO-ENTMCNC: 32.6 G/DL (ref 31.5–36.5)
MCV RBC AUTO: 92 FL (ref 78–100)
PLATELET # BLD AUTO: 276 10E3/UL (ref 150–450)
POTASSIUM SERPL-SCNC: 4.8 MMOL/L (ref 3.4–5.3)
POTASSIUM SERPL-SCNC: 4.8 MMOL/L (ref 3.4–5.3)
RBC # BLD AUTO: 3.73 10E6/UL (ref 3.8–5.2)
SODIUM SERPL-SCNC: 139 MMOL/L (ref 135–145)
SODIUM SERPL-SCNC: 139 MMOL/L (ref 135–145)
WBC # BLD AUTO: 15.2 10E3/UL (ref 4–11)

## 2024-08-16 PROCEDURE — 84295 ASSAY OF SERUM SODIUM: CPT | Performed by: STUDENT IN AN ORGANIZED HEALTH CARE EDUCATION/TRAINING PROGRAM

## 2024-08-16 PROCEDURE — 250N000013 HC RX MED GY IP 250 OP 250 PS 637: Performed by: STUDENT IN AN ORGANIZED HEALTH CARE EDUCATION/TRAINING PROGRAM

## 2024-08-16 PROCEDURE — 250N000009 HC RX 250: Performed by: INTERNAL MEDICINE

## 2024-08-16 PROCEDURE — 999N000157 HC STATISTIC RCP TIME EA 10 MIN

## 2024-08-16 PROCEDURE — 94640 AIRWAY INHALATION TREATMENT: CPT | Mod: 76

## 2024-08-16 PROCEDURE — 36415 COLL VENOUS BLD VENIPUNCTURE: CPT | Performed by: STUDENT IN AN ORGANIZED HEALTH CARE EDUCATION/TRAINING PROGRAM

## 2024-08-16 PROCEDURE — 250N000013 HC RX MED GY IP 250 OP 250 PS 637: Performed by: INTERNAL MEDICINE

## 2024-08-16 PROCEDURE — 250N000011 HC RX IP 250 OP 636: Performed by: INTERNAL MEDICINE

## 2024-08-16 PROCEDURE — 250N000011 HC RX IP 250 OP 636: Performed by: PHYSICIAN ASSISTANT

## 2024-08-16 PROCEDURE — 94640 AIRWAY INHALATION TREATMENT: CPT

## 2024-08-16 PROCEDURE — 120N000001 HC R&B MED SURG/OB

## 2024-08-16 PROCEDURE — 85048 AUTOMATED LEUKOCYTE COUNT: CPT | Performed by: STUDENT IN AN ORGANIZED HEALTH CARE EDUCATION/TRAINING PROGRAM

## 2024-08-16 PROCEDURE — G0463 HOSPITAL OUTPT CLINIC VISIT: HCPCS

## 2024-08-16 PROCEDURE — 80048 BASIC METABOLIC PNL TOTAL CA: CPT | Performed by: STUDENT IN AN ORGANIZED HEALTH CARE EDUCATION/TRAINING PROGRAM

## 2024-08-16 PROCEDURE — 99232 SBSQ HOSP IP/OBS MODERATE 35: CPT | Performed by: HOSPITALIST

## 2024-08-16 RX ORDER — PREDNISONE 20 MG/1
40 TABLET ORAL DAILY
Status: DISCONTINUED | OUTPATIENT
Start: 2024-08-17 | End: 2024-08-17

## 2024-08-16 RX ORDER — ENOXAPARIN SODIUM 100 MG/ML
40 INJECTION SUBCUTANEOUS EVERY 24 HOURS
Status: DISCONTINUED | OUTPATIENT
Start: 2024-08-16 | End: 2024-08-19 | Stop reason: HOSPADM

## 2024-08-16 RX ADMIN — METHOCARBAMOL 1000 MG: 500 TABLET ORAL at 13:21

## 2024-08-16 RX ADMIN — ACETAMINOPHEN 650 MG: 325 TABLET, FILM COATED ORAL at 03:36

## 2024-08-16 RX ADMIN — METHOCARBAMOL 1000 MG: 500 TABLET ORAL at 22:01

## 2024-08-16 RX ADMIN — LEVOTHYROXINE SODIUM 112 MCG: 0.11 TABLET ORAL at 08:55

## 2024-08-16 RX ADMIN — LEVALBUTEROL HYDROCHLORIDE 0.63 MG: 0.63 SOLUTION RESPIRATORY (INHALATION) at 16:36

## 2024-08-16 RX ADMIN — UMECLIDINIUM 1 PUFF: 62.5 AEROSOL, POWDER ORAL at 08:16

## 2024-08-16 RX ADMIN — METOPROLOL TARTRATE 37.5 MG: 25 TABLET, FILM COATED ORAL at 22:02

## 2024-08-16 RX ADMIN — AZITHROMYCIN DIHYDRATE 500 MG: 250 TABLET ORAL at 08:55

## 2024-08-16 RX ADMIN — ENOXAPARIN SODIUM 40 MG: 40 INJECTION SUBCUTANEOUS at 13:20

## 2024-08-16 RX ADMIN — LEVALBUTEROL HYDROCHLORIDE 0.63 MG: 0.63 SOLUTION RESPIRATORY (INHALATION) at 20:19

## 2024-08-16 RX ADMIN — METOPROLOL TARTRATE 37.5 MG: 25 TABLET, FILM COATED ORAL at 08:55

## 2024-08-16 RX ADMIN — ACETAMINOPHEN 650 MG: 325 TABLET, FILM COATED ORAL at 16:15

## 2024-08-16 RX ADMIN — METHOCARBAMOL 1000 MG: 500 TABLET ORAL at 08:55

## 2024-08-16 RX ADMIN — PAROXETINE HYDROCHLORIDE 20 MG: 20 TABLET, FILM COATED ORAL at 09:58

## 2024-08-16 RX ADMIN — ACETAMINOPHEN 650 MG: 325 TABLET, FILM COATED ORAL at 10:55

## 2024-08-16 RX ADMIN — SODIUM CHLORIDE SOLN NEBU 3% 4 ML: 3 NEBU SOLN at 20:19

## 2024-08-16 RX ADMIN — LEVALBUTEROL HYDROCHLORIDE 0.63 MG: 0.63 SOLUTION RESPIRATORY (INHALATION) at 11:58

## 2024-08-16 RX ADMIN — ACETAMINOPHEN 650 MG: 325 TABLET, FILM COATED ORAL at 22:02

## 2024-08-16 RX ADMIN — SODIUM CHLORIDE SOLN NEBU 3% 4 ML: 3 NEBU SOLN at 08:16

## 2024-08-16 RX ADMIN — LORAZEPAM 0.5 MG: 0.5 TABLET ORAL at 04:17

## 2024-08-16 RX ADMIN — DILTIAZEM HYDROCHLORIDE 240 MG: 240 CAPSULE, COATED, EXTENDED RELEASE ORAL at 08:55

## 2024-08-16 RX ADMIN — LEVALBUTEROL HYDROCHLORIDE 0.63 MG: 0.63 SOLUTION RESPIRATORY (INHALATION) at 08:16

## 2024-08-16 RX ADMIN — METHYLPREDNISOLONE SODIUM SUCCINATE 40 MG: 40 INJECTION INTRAMUSCULAR; INTRAVENOUS at 06:14

## 2024-08-16 RX ADMIN — ATORVASTATIN CALCIUM 20 MG: 20 TABLET, FILM COATED ORAL at 08:55

## 2024-08-16 RX ADMIN — FLUTICASONE FUROATE AND VILANTEROL 1 PUFF: 200; 25 POWDER RESPIRATORY (INHALATION) at 08:16

## 2024-08-16 ASSESSMENT — ACTIVITIES OF DAILY LIVING (ADL)
ADLS_ACUITY_SCORE: 32
ADLS_ACUITY_SCORE: 32
ADLS_ACUITY_SCORE: 28
ADLS_ACUITY_SCORE: 28
ADLS_ACUITY_SCORE: 32
ADLS_ACUITY_SCORE: 32
ADLS_ACUITY_SCORE: 28
ADLS_ACUITY_SCORE: 32
ADLS_ACUITY_SCORE: 28
ADLS_ACUITY_SCORE: 32

## 2024-08-16 NOTE — PROGRESS NOTES
[facility]  PROGRESS NOTE    August 16, 2024  Post-op Day # 1    SUBJECTIVE:  Pain controlled. Tolerating clears. Ambulated. 25 stoma output recorded. AM labs pending. Hypertensive to 156/88, otherwise AVSS on 1 L O2.     OBJECTIVE:  Temp:  [97.1  F (36.2  C)-97.7  F (36.5  C)] 97.1  F (36.2  C)  Pulse:  [57-74] 62  Resp:  [10-24] 19  BP: (142-181)/() 156/88  FiO2 (%):  [21 %] 21 %  SpO2:  [94 %-100 %] 96 %    Intake/Output Summary (Last 24 hours) at 8/16/2024 1042  Last data filed at 8/16/2024 0700  Gross per 24 hour   Intake 1000 ml   Output 525 ml   Net 475 ml       GENERAL:  Awake, alert, no acute distress, lying in bed  HEAD: Nomocephalic atraumatic  SCLERA: anicteric  EXTREMITIES: warm and well perfused  ABDOMEN:  Soft, appropriately tender, mildly-distended, no rebound or guarding, no peritoneal signs. Stoma and mucous fistula viable.   INCISION:  Clean and dry with penrose in prior stoma site. Dressing was changed previously this am.     LABS:  Lab Results   Component Value Date    WBC 7.8 08/15/2024    WBC 6.2 02/02/2006     Lab Results   Component Value Date    HGB 11.6 08/15/2024    HGB 10.8 02/02/2006     Lab Results   Component Value Date    HCT 37.5 08/15/2024    HCT 30.4 02/02/2006     Lab Results   Component Value Date     08/15/2024     02/02/2006     Last Basic Metabolic Panel:  Lab Results   Component Value Date     08/15/2024     02/02/2006      Lab Results   Component Value Date    POTASSIUM 3.9 08/15/2024    POTASSIUM 5.1 06/21/2023    POTASSIUM 4.5 05/17/2022    POTASSIUM 3.4 02/02/2006     Lab Results   Component Value Date    CHLORIDE 103 08/15/2024    CHLORIDE 99 06/21/2023    CHLORIDE 103 05/17/2022    CHLORIDE 109 02/02/2006     Lab Results   Component Value Date    EDIN 8.6 08/15/2024    EDIN 7.8 02/02/2006     Lab Results   Component Value Date    CO2 24 08/15/2024    CO2 29 05/17/2022    CO2 17 02/02/2006     Lab Results   Component Value Date  "   BUN 19.7 08/15/2024    BUN 7 06/21/2023    BUN 17 05/17/2022    BUN <2 02/02/2006     Lab Results   Component Value Date    CR 0.59 08/15/2024    CR 0.80 02/02/2006     Lab Results   Component Value Date     08/15/2024     08/13/2024     05/17/2022    GLC 90 02/02/2006       ASSESSMENT/PLAN: Lara Melendez is a 58-year-old woman with severe COPD, pAfib, and hx of transverse loop colostomy for LBO, admitted with COVID-19 and acute COPD exacerbation, now POD#1 s/p stoma revision, parastomal hernia repair and small bowel resection for stoma prolapse.    - Clears  - Pain management, minimize narcotics  - Encourage ambulation 4x daily  - Daily dressing changes  - WOCN for stoma cares and teaching  - Lovenox for DVT ppx if labs stable      For questions/paging, please contact the CRS office at [phone].    Toy Forbes PA-C  [facility]  Phone: [phone]     [facility] Attending Note    Patient seen and examined independently.  Agree with above assessment and plan.    Pain controlled. Tolerating clears    ABd distended  Stoma re-sited with  mucous fistula. Midline dressing with penrose.     Plan  Clears  Wound care.abdominal binder  Pulmonary toilet to reduce coughing  Supportive cares.      Clinically Significant Risk Factors                  # Hypertension: Noted on problem list           # Overweight: Estimated body mass index is 25.91 kg/m  as calculated from the following:    Height as of this encounter: 1.613 m (5' 3.5\").    Weight as of this encounter: 67.4 kg (148 lb 9.4 oz).      # COPD: noted on problem list                Laurita Marques MD  [facility]  [address], [address]  [place] [address]  T: [phone]  F: [phone]   [email or web address]        "

## 2024-08-16 NOTE — PROGRESS NOTES
Cross cover    Pt lost IV acess but is on solumedrol 40 mg TID    Notes indicate that the steroids were to be change to every day as of 8/13.      Start prednisone 40 mg daily tomorrow.  May need a midline v PICC?    KP

## 2024-08-16 NOTE — PLAN OF CARE
Goal Outcome Evaluation:      Plan of Care Reviewed With: patient    Overall Patient Progress: improvingOverall Patient Progress: improving     Patient A&Ox4,  1L/min of oxygen. X1 pivot to commode. Ostomy bad changed, Incision dressings intact with marked drainage. Special precautions maintained. Up SBA pivot to commode. Abd binder at all times. Patient wants to eat regular food- contacted [facility] to get approval- have not heard back at this time. Pending discharge plans.    Problem: Adult Inpatient Plan of Care  Goal: Plan of Care Review  Outcome: Progressing  Flowsheets (Taken 8/16/2024 1036)  Plan of Care Reviewed With: patient  Overall Patient Progress: improving  Goal: Patient-Specific Goal (Individualized)  Outcome: Progressing  Goal: Absence of Hospital-Acquired Illness or Injury  Outcome: Progressing  Intervention: Identify and Manage Fall Risk  Recent Flowsheet Documentation  Taken 8/16/2024 1001 by Aaliyah Alcantara RN  Safety Promotion/Fall Prevention:   activity supervised   assistive device/personal items within reach   safety round/check completed   room organization consistent   room near nurse's station  Intervention: Prevent and Manage VTE (Venous Thromboembolism) Risk  Recent Flowsheet Documentation  Taken 8/16/2024 1001 by Aaliyah Alcantara RN  VTE Prevention/Management: SCDs on (sequential compression devices)  Intervention: Prevent Infection  Recent Flowsheet Documentation  Taken 8/16/2024 1001 by Aaliyah Alcantara RN  Infection Prevention:   hand hygiene promoted   rest/sleep promoted  Goal: Optimal Comfort and Wellbeing  Outcome: Progressing  Goal: Readiness for Transition of Care  Outcome: Progressing     Problem: Skin Injury Risk Increased  Goal: Skin Health and Integrity  Outcome: Progressing  Intervention: Plan: Nurse Driven Intervention: Moisture Management  Recent Flowsheet Documentation  Taken 8/16/2024 1001 by Aaliyah Alcantara RN  Moisture Interventions:   Encourage regular  toileting   No brief in bed   Incontinence pad  Intervention: Optimize Skin Protection  Recent Flowsheet Documentation  Taken 8/16/2024 1001 by Aaliyah Alcantara RN  Activity Management:   activity adjusted per tolerance   activity encouraged     Problem: Infection  Goal: Absence of Infection Signs and Symptoms  Outcome: Progressing  Intervention: Prevent or Manage Infection  Recent Flowsheet Documentation  Taken 8/16/2024 1001 by Aaliyah Alcantara RN  Isolation Precautions: special precautions maintained     Problem: COPD (Chronic Obstructive Pulmonary Disease)  Goal: Optimal Chronic Illness Coping  Outcome: Progressing  Goal: Optimal Level of Functional Foard  Outcome: Progressing  Intervention: Optimize Functional Ability  Recent Flowsheet Documentation  Taken 8/16/2024 1001 by Aaliyah Alcantara RN  Activity Management:   activity adjusted per tolerance   activity encouraged  Goal: Absence of Infection Signs and Symptoms  Outcome: Progressing  Intervention: Prevent or Manage Infection  Recent Flowsheet Documentation  Taken 8/16/2024 1001 by Aaliyah Alcantara RN  Isolation Precautions: special precautions maintained  Goal: Improved Oral Intake  Outcome: Progressing  Goal: Effective Oxygenation and Ventilation  Outcome: Progressing  Intervention: Promote Airway Secretion Clearance  Recent Flowsheet Documentation  Taken 8/16/2024 1001 by Aaliyah Alcantara RN  Cough And Deep Breathing: done with encouragement  Activity Management:   activity adjusted per tolerance   activity encouraged     Problem: Comorbidity Management  Goal: Maintenance of COPD Symptom Control  Outcome: Progressing  Intervention: Maintain COPD Symptom Control  Recent Flowsheet Documentation  Taken 8/16/2024 1001 by Aaliyah Alcantara RN  Medication Review/Management: medications reviewed  Goal: Blood Pressure in Desired Range  Outcome: Progressing  Intervention: Maintain Blood Pressure Management  Recent Flowsheet Documentation  Taken 8/16/2024  1001 by Aaliyah Alcantara, RN  Medication Review/Management: medications reviewed     Problem: Hernia Repair  Goal: Absence of Bleeding  Outcome: Progressing  Intervention: Monitor and Manage Bleeding  Recent Flowsheet Documentation  Taken 8/16/2024 1001 by Aaliyah Alcantara RN  Bleeding Management: dressing monitored  Goal: Effective Bowel Elimination  Outcome: Progressing  Goal: Fluid and Electrolyte Balance  Outcome: Progressing  Goal: Absence of Infection Signs and Symptoms  Outcome: Progressing  Intervention: Prevent or Manage Infection  Recent Flowsheet Documentation  Taken 8/16/2024 1001 by Aaliyah Alcantara RN  Isolation Precautions: special precautions maintained  Goal: Anesthesia/Sedation Recovery  Outcome: Progressing  Intervention: Optimize Anesthesia Recovery  Recent Flowsheet Documentation  Taken 8/16/2024 1001 by Aaliyah Alcantara, RN  Safety Promotion/Fall Prevention:   activity supervised   assistive device/personal items within reach   safety round/check completed   room organization consistent   room near nurse's station  Goal: Optimal Pain Control and Function  Outcome: Progressing  Goal: Nausea and Vomiting Relief  Outcome: Progressing  Goal: Effective Urinary Elimination  Outcome: Progressing  Goal: Effective Oxygenation and Ventilation  Outcome: Progressing

## 2024-08-16 NOTE — PROGRESS NOTES
[facility] Nurse Inpatient Assessment     Consulted for: Colostomy    Summary: Patient now s/p stoma revision and hernia repair, exploratory laparotomy and small bowel resection on 8/15/24.  Now with right sided colostomy and left sided mucous fistula.      Patient History (according to provider note(s):      58 year old female with PMH including severe COPD on 2-3 L O2 requiring previous tracheostomy since did not cannulated and intubation earlier this year, paroxysmal A-fib on Eliquis, HTN, HLD, hypothyroidism, anemia, MDD, anxiety, and large bowel obstruction requiring ostomy placement who presents with severe shortness of breath.  For the last 1 to 2 days she has felt fatigued and had a headache.  Throughout the day today she became severely short of breath and wheezy.  She tried nebulizer treatments without improvement.  She has been taking prednisone 10 mg daily to try to optimize her lung status to get her ostomy takedown surgery, but today she took 30 mg due to her wheezing.  She denies any cough, myalgias, nausea, vomiting, diarrhea, chest pain.  She has had COVID-19 before and is vaccinated.  She has required intubation for COPD exacerbations the past including in January and she required a tracheostomy a little over 1 year ago.    Assessment:      Assessment of new end Colostomy:  Diagnosis Pertinent to Stoma: Stoma prolapse                                       Surgery Date: 8/15/23  Sur[facility]  Pouching system in place on assessment today: Coloplast one piece, cut to fit, flat.   Pouch barrier status: Leaking  Pouch last changed/wear time: 1 day  Reason for pouch change today: leakage, routine schedule, and initial post-op assessment  Effectiveness of current pouching/ supply plan: Attempting new system, will re-evaluate next assessment  Change made with ostomy management today: Yes  Pouching system  "placed today: Michael one piece, flat, barrier ring, and ostomy strips   Supplies: at bedside, discussed with RN, and discussed with patient   Last photo: 8/16/24    Stoma location: RUQ  Stoma size: 1 1/4 x 1 1/2 inche,   Stoma appearance: pink healthy  Mucocutaneous junction:  intact  Peristomal complication(s): none   Output: soft  Output volume emptied during visit: 0ml  Abdominal assessment: Distended    Lifting dressing over mucous fistula which is pink/healthy and flush.  No concerns at site.      Treatment Plan:     RUQ Colostomy pouching plan:   Pouching system: ostomy supplies pouches: Michael Flat FECAL ([id number])   Accessories used: [facility] ostomy accessories: 2\" Cera Barrier Ring ([id number])   Frequency of pouch changes: Twice weekly  WOC follow up plan:  MWF  Bedside RN interventions: Change pouch PRN if leaking using the supplies above, Empty pouch when 1/3 to 1/2 full, ensure to clean pouch outlet after emptying to prevent odor, Notify WOC for ongoing pouch leakage, Document stoma appearance and output volume, color, and consistency every shift, and Encourage patient to empty pouch with assist    Orders: Reviewed    RECOMMEND PRIMARY TEAM ORDER: None, at this time  Education provided: plan of care  Discussed plan of care with: Patient and Nurse  WOC nurse follow-up plan: MWF  Notify WOC if wound(s) deteriorate.  Nursing to notify the Provider(s) and re-consult the WOC Nurse if new skin concern.    DATA:     Current support surface: Standard  Standard Isoflex gel  Containment of urine/stool: Colostomy pouch  BMI: Body mass index is 25.91 kg/m .   Active diet order: Orders Placed This Encounter      Clear Liquid Diet     Output: I/O last 3 completed shifts:  In: 1060 [P.O.:60; I.V.:1000]  Out: 601 [Urine:451; Stool:150]     Labs:   Recent Labs   Lab 08/16/24  1100 08/11/24  0540 08/10/24  0534   ALBUMIN  --   --  3.9   HGB 11.2*   < > 11.4*   WBC 15.2*   < > 9.4    < > = values in this interval not " displayed.     Pressure injury risk assessment:   Sensory Perception: 4-->no impairment  Moisture: 3-->occasionally moist  Activity: 2-->chairfast  Mobility: 3-->slightly limited  Nutrition: 2-->probably inadequate  Friction and Shear: 3-->no apparent problem  Lee Score: 17    Harlan Delgado RN CWOCN  Contact Via [facility] Nurse [facility])  Dept. Office Number: [phone]

## 2024-08-16 NOTE — CONSULTS
"CLINICAL NUTRITION SERVICES  -  ASSESSMENT NOTE    Recommendations Ordered by Registered Dietitian (RD):   Diet advancement per CRS - would recommend consideration of nutrition support if unable to advance diet    Ordered Gelatein Plus once per day, ok to order PRN as well    Malnutrition:   % Weight Loss:  unable to determine   % Intake:  </= 50% for >/= 5 days (severe malnutrition)  Subcutaneous Fat Loss:  unable to determine - COVID   Muscle Loss:  unable to determine - COVID   Fluid Retention:  None noted    Malnutrition Diagnosis: Unable to determine due to lack of information       REASON FOR ASSESSMENT  Lara Melendez is a 58 year old female seen by Registered Dietitian for LOS    Past medical history: severe COPD on 2-3 L O2 requiring previous tracheostomy since did not cannulated and intubation earlier this year, paroxysmal A-fib on Eliquis, HTN, HLD, hypothyroidism, anemia, MDD, anxiety, and large bowel obstruction requiring ostomy placement     Admitted for:   COVID-19 infection  Acute on chronic hypoxemic and hypercapnic respiratory failure  Severe COPD with acute exacerbation    NUTRITION HISTORY  - Information obtained from chart - pt phone not connected to room therefore unable to call and obtain information   - Food allergies: NKFA    CURRENT NUTRITION ORDERS  Diet Order:     Clear Liquid Diet     Current Intake/Tolerance:  8/09-8/10: NPO   8/10-8/14: Regular Diet   8/14-8/15: NPO   8/15-current: Clear Liquid Diet     Only three meals were ordered from 8/10-8/14. Very minimal intake throughout entire admit.     Obtained from Chart/Interdisciplinary Team:  - WOC following   - CRS following - underwent \"stoma revision, parastomal hernia repair and small bowel resection for stoma prolapse.\"   - Reviewed stooling patterns  - Please refer to flowsheets for more information on skin     ANTHROPOMETRICS  Height: 5' 3.5\"  Weight: 67.4 kg ( 148 lbs 9.44 oz)   Body mass index is 25.91 kg/m .  Weight Status:  " Overweight BMI 25-29.9  Weight History: Unclear weight hx - possible weight loss of 4.6 kg (6.4%) over the past ~ 6 months vs outlier? Very difficult to determine without confirmation from patient.   Wt Readings from Last 10 Encounters:   08/13/24 67.4 kg (148 lb 9.4 oz)   02/11/24 72 kg (158 lb 11.7 oz)   01/09/24 66 kg (145 lb 8.1 oz)   12/28/23 65 kg (143 lb 4.8 oz)   10/03/23 61.2 kg (135 lb)   07/23/23 60 kg (132 lb 4.4 oz)   04/01/23 68.8 kg (151 lb 9.6 oz)   02/11/23 60 kg (132 lb 4.4 oz)   12/02/22 61.3 kg (135 lb 2.3 oz)   09/28/22 63.1 kg (139 lb 1.6 oz)     LABS  Labs reviewed    Labs:  Electrolytes  Potassium (mmol/L)   Date Value   08/16/2024 4.8   08/16/2024 4.8   08/15/2024 3.9   05/17/2022 4.5   05/16/2022 4.6   05/15/2022 4.1   02/02/2006 3.4   02/01/2006 3.5   01/31/2006 3.9     Potassium POCT (mmol/L)   Date Value   06/21/2023 5.1     Phosphorus (mg/dL)   Date Value   02/14/2024 3.1   02/13/2024 3.1   02/12/2024 2.7   02/11/2024 2.9   02/10/2024 2.3 (L)   01/30/2006 3.1    Blood Glucose  Glucose (mg/dL)   Date Value   08/16/2024 141 (H)   08/15/2024 146 (H)   08/14/2024 102 (H)   08/12/2024 140 (H)   08/11/2024 122 (H)   05/17/2022 124 (H)   05/16/2022 138 (H)   05/15/2022 138 (H)   05/14/2022 120 (H)   05/14/2022 120 (H)   05/14/2022 118 (H)   02/02/2006 90   02/01/2006 90   01/31/2006 95   01/30/2006 106   01/30/2006 144 (H)     GLUCOSE BY METER POCT (mg/dL)   Date Value   08/13/2024 140 (H)   08/10/2024 122 (H)   08/10/2024 142 (H)   08/10/2024 119 (H)   08/10/2024 120 (H)     Hemoglobin A1C (%)   Date Value   07/21/2023 5.5   03/26/2023 4.9   12/01/2022 4.4    Inflammatory Markers  CRP Inflammation (mg/L)   Date Value   05/17/2022 <2.9   05/16/2022 <2.9   05/15/2022 3.6     WBC (10e9/L)   Date Value   02/02/2006 6.2   02/01/2006 7.7   01/31/2006 8.2     WBC Count (10e3/uL)   Date Value   08/16/2024 15.2 (H)   08/15/2024 7.8   08/14/2024 8.8     Albumin (g/dL)   Date Value   08/10/2024 3.9    01/22/2024 4.4   01/09/2024 4.6   05/15/2022 3.0 (L)   05/14/2022 3.5   05/14/2022 3.5   02/01/2006 2.3 (L)   01/30/2006 2.5 (L)   01/30/2006 2.9 (L)      Magnesium (mg/dL)   Date Value   02/14/2024 2.1   02/13/2024 2.1   02/12/2024 2.0   01/31/2006 2.0   01/31/2006 1.4 (L)   01/30/2006 2.6 (H)     Magnesium Laxative Screen (no units)   Date Value   01/30/2006 91.0     Sodium (mmol/L)   Date Value   08/16/2024 139   08/16/2024 139   08/15/2024 139   02/02/2006 133   02/01/2006 135   01/31/2006 130 (L)    Renal  Urea Nitrogen (mg/dL)   Date Value   08/16/2024 22.9   08/15/2024 19.7   08/14/2024 21.8   05/17/2022 17   05/16/2022 14   05/15/2022 10   02/02/2006 <2 (L)   02/01/2006 3 (L)   01/31/2006 4 (L)     UREA NITROGEN POCT (mg/dL)   Date Value   06/21/2023 7     Creatinine (mg/dL)   Date Value   08/16/2024 0.68   08/15/2024 0.59   08/14/2024 0.68   02/02/2006 0.80   02/01/2006 0.90   01/31/2006 0.90     Additional  Triglycerides (mg/dL)   Date Value   02/01/2024 69   07/08/2023 111   06/30/2023 97     Ketones Urine (mg/dL)   Date Value   06/28/2023 Negative   01/29/2006 Negative        MEDICATIONS  Medications reviewed    Current Facility-Administered Medications   Medication Dose Route Frequency Provider Last Rate Last Admin    acetaminophen (TYLENOL) tablet 650 mg  650 mg Oral Q6H Eileen Cason MD   650 mg at 08/16/24 1055    Or    acetaminophen (TYLENOL) oral liquid 650 mg  650 mg Per NG tube Q6H Eileen Cason MD        [Held by provider] apixaban ANTICOAGULANT (ELIQUIS) tablet 5 mg  5 mg Oral BID Albin Navarro MD   5 mg at 08/14/24 0802    atorvastatin (LIPITOR) tablet 20 mg  20 mg Oral Daily Albin Navarro MD   20 mg at 08/16/24 0855    azithromycin (ZITHROMAX) tablet 500 mg  500 mg Oral Q Mon Wed Fri AM Albin Navarro MD   500 mg at 08/16/24 0855    diltiazem ER COATED BEADS (CARDIZEM CD/CARTIA XT) 24 hr capsule 240 mg  240 mg Oral Daily Eileen Lara DO   240 mg  at 08/16/24 0855    fluticasone-vilanterol (BREO ELLIPTA) 200-25 MCG/ACT inhaler 1 puff [PATIENT-SUPPLIED]  1 puff Inhalation Daily Eileen Lara DO   1 puff at 08/16/24 0816    levalbuterol (XOPENEX) neb solution 0.63 mg  0.63 mg Nebulization 4x Daily Brandt Gonzalez MD   0.63 mg at 08/16/24 0816    levothyroxine (SYNTHROID/LEVOTHROID) tablet 112 mcg  112 mcg Oral Daily Albin Navarro MD   112 mcg at 08/16/24 0855    methocarbamol (ROBAXIN) tablet 1,000 mg  1,000 mg Oral TID Eileen Cason MD   1,000 mg at 08/16/24 0855    methylPREDNISolone sodium succinate (SOLU-MEDROL) injection 40 mg  40 mg Intravenous Q8H Santy Prieto DO   40 mg at 08/16/24 0614    metoprolol tartrate (LOPRESSOR) half-tab 37.5 mg  37.5 mg Oral BID Albin Navarro MD   37.5 mg at 08/16/24 0855    PARoxetine (PAXIL) tablet 20 mg  20 mg Oral Daily Albin Navarro MD   20 mg at 08/16/24 0958    sodium chloride (NEBUSAL) 3 % neb solution 4 mL  4 mL Nebulization BID Albin Navarro MD   4 mL at 08/16/24 0816    umeclidinium (INCRUSE ELLIPTA) 62.5 MCG/ACT inhaler 1 puff  1 puff Inhalation Daily Brandt Gonzalez MD   1 puff at 08/16/24 0816      Current Facility-Administered Medications   Medication Dose Route Frequency Provider Last Rate Last Admin    No lozenges or gum should be given while patient on BIPAP/AVAPS/AVAPS AE   Does not apply Continuous PRN Aftab High MD        No lozenges or gum should be given while patient on BIPAP/AVAPS/AVAPS AE   Does not apply Continuous PRN Albin Navarro MD        Patient is already receiving anticoagulation with heparin, enoxaparin (LOVENOX), warfarin (COUMADIN)  or other anticoagulant medication   Does not apply Continuous PRN Albin Navarro MD        Patient may continue current oral medications   Does not apply Continuous PRN Aftab High MD        Patient may continue current oral medications   Does not apply  Continuous PRN Albin Navarro MD          Current Facility-Administered Medications   Medication Dose Route Frequency Provider Last Rate Last Admin    albuterol (PROVENTIL HFA/VENTOLIN HFA) inhaler  2 puff Inhalation Q4H PRN Albin Navarro MD        albuterol (PROVENTIL) neb solution 2.5 mg  2.5 mg Nebulization Q4H PRN Albin Navarro MD   2.5 mg at 08/12/24 0405    benzonatate (TESSALON) capsule 100 mg  100 mg Oral TID PRN Santy Prieto DO   100 mg at 08/15/24 2223    carboxymethylcellulose PF (REFRESH PLUS) 0.5 % ophthalmic solution 1 drop  1 drop Both Eyes Q1H PRN Aftab High MD        glucose gel 15-30 g  15-30 g Oral Q15 Min PRN Albin Navarro MD        Or    dextrose 50 % injection 25-50 mL  25-50 mL Intravenous Q15 Min PRN Albin Navarro MD        Or    glucagon injection 1 mg  1 mg Subcutaneous Q15 Min PRN Albin Navarro MD        guaiFENesin (ROBITUSSIN) 20 mg/mL solution 200 mg  200 mg Oral Q4H PRN Santy Prieto DO        hydrALAZINE (APRESOLINE) injection 10 mg  10 mg Intravenous Q6H PRN Brandt Gonzalez MD        HYDROmorphone (DILAUDID) injection 0.2-0.4 mg  0.2-0.4 mg Intravenous Q2H PRN Eileen Cason MD        LORazepam (ATIVAN) tablet 0.5 mg  0.5 mg Oral Q4H PRN Quique Galvin MD   0.5 mg at 08/16/24 0417    No lozenges or gum should be given while patient on BIPAP/AVAPS/AVAPS AE   Does not apply Continuous PRN Aftab High MD        No lozenges or gum should be given while patient on BIPAP/AVAPS/AVAPS AE   Does not apply Continuous PRN Albin Navarro MD        ondansetron (ZOFRAN ODT) ODT tab 4 mg  4 mg Oral Q6H PRN Albin Navarro MD        Or    ondansetron (ZOFRAN) injection 4 mg  4 mg Intravenous Q6H PRN Albin Navarro MD        oxyCODONE (ROXICODONE) tablet 5-10 mg  5-10 mg Oral Q4H PRN Eileen Cason MD        Patient is already receiving anticoagulation with heparin,  enoxaparin (LOVENOX), warfarin (COUMADIN)  or other anticoagulant medication   Does not apply Continuous PRN Albin Navarro MD        Patient may continue current oral medications   Does not apply Continuous PRN Aftab High MD        Patient may continue current oral medications   Does not apply Continuous PRN Albin Navarro MD        prochlorperazine (COMPAZINE) injection 10 mg  10 mg Intravenous Q6H PRN Albin Navarro MD        Or    prochlorperazine (COMPAZINE) tablet 10 mg  10 mg Oral Q6H PRN Albin Navarro MD        Or    prochlorperazine (COMPAZINE) suppository 25 mg  25 mg Rectal Q12H PRN Albin Navarro MD        silver nitrate (ARZOL) Misc   Topical Daily PRN Brandt Gonzalez MD            ASSESSED NUTRITION NEEDS PER APPROVED PRACTICE GUIDELINES:    Dosing Weight 67.4 kg   Estimated Energy Needs: 6915-1576 kcals (25-30 Kcal/Kg)  Justification: maintenance  Estimated Protein Needs: 67-81 grams protein (1-1.2 g pro/Kg)  Justification: maintenance  Estimated Fluid Needs: per MD     NUTRITION DIAGNOSIS:  Inadequate oral intake related to  suspected poor appetite vs restricted diet order as evidenced by patient likely consuming <50% of nutritional needs throughout admission (7 days)     NUTRITION INTERVENTIONS  Recommendations / Nutrition Prescription  Diet advancement per CRS - would recommend consideration of nutrition support if unable to advance diet    Ordered Gelatein Plus once per day, ok to order PRN as well     Implementation  Nutrition education: pt unavailable   Medical Food Supplement: as above     Nutrition Goals  Diet to advance >/=full liquids in the next 24-48 hours vs consideration of nutrition support     MONITORING AND EVALUATION:  Progress towards goals will be monitored and evaluated per protocol and Practice Guidelines    Milagro Pereira RD, LD  Clinical Dietitian     3rd floor/ICU: [phone]  All other floors:  [phone]  Weekend/holiday: [phone]  Office: [phone]

## 2024-08-16 NOTE — PLAN OF CARE
Goal Outcome Evaluation:      Plan of Care Reviewed With: patient    Overall Patient Progress: improvingOverall Patient Progress: improving    Outcome Evaluation: PRN lorazepam given overnight. No acute events overnight. Surgical dressing was saturated, dressing changed. DC home when medically stable      Problem: Adult Inpatient Plan of Care  Goal: Plan of Care Review  Description: The Plan of Care Review/Shift note should be completed every shift.  The Outcome Evaluation is a brief statement about your assessment that the patient is improving, declining, or no change.  This information will be displayed automatically on your shift  note.  Outcome: Progressing  Flowsheets (Taken 8/16/2024 0523)  Outcome Evaluation: PRN lorazepam given overnight. No acute events overnight. Surgical dressing was saturated, dressing changed. DC home when medically stable  Plan of Care Reviewed With: patient  Overall Patient Progress: improving  Goal: Absence of Hospital-Acquired Illness or Injury  Intervention: Identify and Manage Fall Risk  Recent Flowsheet Documentation  Taken 8/16/2024 0349 by Lolis Barnes RN  Safety Promotion/Fall Prevention: safety round/check completed  Intervention: Prevent Infection  Recent Flowsheet Documentation  Taken 8/16/2024 0349 by Lolis Barnes RN  Infection Prevention:   single patient room provided   personal protective equipment utilized  Goal: Optimal Comfort and Wellbeing  Intervention: Monitor Pain and Promote Comfort  Recent Flowsheet Documentation  Taken 8/16/2024 0336 by Lolis Barnes RN  Pain Management Interventions: medication (see MAR)     Problem: Infection  Goal: Absence of Infection Signs and Symptoms  Intervention: Prevent or Manage Infection  Recent Flowsheet Documentation  Taken 8/16/2024 0349 by Lolis Barnes RN  Isolation Precautions: special precautions maintained     Problem: COPD (Chronic Obstructive Pulmonary Disease)  Goal: Absence of Infection Signs and  Symptoms  Intervention: Prevent or Manage Infection  Recent Flowsheet Documentation  Taken 8/16/2024 0349 by Lolis Barnes RN  Isolation Precautions: special precautions maintained     Problem: Comorbidity Management  Goal: Maintenance of COPD Symptom Control  Intervention: Maintain COPD Symptom Control  Recent Flowsheet Documentation  Taken 8/16/2024 0349 by Lolis Barnes RN  Medication Review/Management:   medications reviewed   high-risk medications identified  Goal: Blood Pressure in Desired Range  Intervention: Maintain Blood Pressure Management  Recent Flowsheet Documentation  Taken 8/16/2024 0349 by Lolis Barnes RN  Medication Review/Management:   medications reviewed   high-risk medications identified     Problem: Hernia Repair  Goal: Absence of Infection Signs and Symptoms  Intervention: Prevent or Manage Infection  Recent Flowsheet Documentation  Taken 8/16/2024 0349 by Lolis Barnes RN  Isolation Precautions: special precautions maintained  Goal: Anesthesia/Sedation Recovery  Intervention: Optimize Anesthesia Recovery  Recent Flowsheet Documentation  Taken 8/16/2024 0349 by Lolis Barnes RN  Safety Promotion/Fall Prevention: safety round/check completed  Goal: Optimal Pain Control and Function  Intervention: Prevent or Manage Pain  Recent Flowsheet Documentation  Taken 8/16/2024 0336 by Lolis Barnes RN  Pain Management Interventions: medication (see MAR)

## 2024-08-16 NOTE — PLAN OF CARE
"Goal Outcome Evaluation:      Plan of Care Reviewed With: patient    Overall Patient Progress: improvingOverall Patient Progress: improving    Patient A&Ox4, is on 2L of O2 per NC. Came back from surgery at 2045. Transferred into bed using the hover mat. Dressing on abdomen with moderate drainage which is marked. Abdominal binder on. New stoma intact with no output. Up with assist of one to bedside commode. Tessalon pearls given for cough. Tylenol and robaxin used for pain management. Will continue to follow plan of care.  Problem: Adult Inpatient Plan of Care  Goal: Plan of Care Review  Description: The Plan of Care Review/Shift note should be completed every shift.  The Outcome Evaluation is a brief statement about your assessment that the patient is improving, declining, or no change.  This information will be displayed automatically on your shift  note.  Outcome: Progressing  Flowsheets (Taken 8/15/2024 8275)  Plan of Care Reviewed With: patient  Overall Patient Progress: improving  Goal: Patient-Specific Goal (Individualized)  Description: You can add care plan individualizations to a care plan. Examples of Individualization might be:  \"Parent requests to be called daily at 9am for status\", \"I have a hard time hearing out of my right ear\", or \"Do not touch me to wake me up as it startles  me\".  Outcome: Progressing  Goal: Absence of Hospital-Acquired Illness or Injury  Outcome: Progressing  Goal: Optimal Comfort and Wellbeing  Outcome: Progressing  Goal: Readiness for Transition of Care  Outcome: Progressing     Problem: Skin Injury Risk Increased  Goal: Skin Health and Integrity  Outcome: Progressing     Problem: Infection  Goal: Absence of Infection Signs and Symptoms  Outcome: Progressing     Problem: COPD (Chronic Obstructive Pulmonary Disease)  Goal: Optimal Chronic Illness Coping  Outcome: Progressing  Goal: Optimal Level of Functional [place]  Outcome: Progressing  Goal: Absence of Infection Signs " and Symptoms  Outcome: Progressing  Goal: Improved Oral Intake  Outcome: Progressing  Goal: Effective Oxygenation and Ventilation  Outcome: Progressing     Problem: Comorbidity Management  Goal: Maintenance of COPD Symptom Control  Outcome: Progressing  Goal: Blood Pressure in Desired Range  Outcome: Progressing     Problem: Hernia Repair  Goal: Absence of Bleeding  Outcome: Progressing  Goal: Effective Bowel Elimination  Outcome: Progressing  Goal: Fluid and Electrolyte Balance  Outcome: Progressing  Goal: Absence of Infection Signs and Symptoms  Outcome: Progressing  Goal: Anesthesia/Sedation Recovery  Outcome: Progressing  Goal: Optimal Pain Control and Function  Outcome: Progressing  Goal: Nausea and Vomiting Relief  Outcome: Progressing  Goal: Effective Urinary Elimination  Outcome: Progressing  Goal: Effective Oxygenation and Ventilation  Outcome: Progressing

## 2024-08-16 NOTE — PROGRESS NOTES
[facility]    Medicine Progress Note - Hospitalist Service    Date of Admission:  8/8/2024    Assessment & Plan   Lara Melendez is a 58 year old female with PMH of severe COPD on 2-3L O2 requiring previous tracheostomy, paroxysmal Afib on eliquis, HTN, HLD, hypothyroidism, anemia, MDD, anxiety, and large bowel obstruction requiring ostomy placement, who was admitted on 8/8/2024 with acute on chronic hypoxemic and hypercapnic respiratory failure 2/2 COPD exacerbation and COVID infection.       COVID-19 infection  Acute on chronic hypoxemic and hypercapnic respiratory failure  Severe COPD with acute exacerbation  She has severe COPD at baseline and follows closely with pulmonology.  She is on numerous inhalers at baseline.  She has been receiving prednisone to try to optimize her lung function prior to having an ostomy takedown and she was taking 10 mg daily although took 30 mg this morning.  For the last 24 to 48 hours she has had headache, fatigue, and progressively worsening shortness of breath with wheezing despite using nebulizer treatments.  In the ER she was in respiratory distress and received further nebs, magnesium, and 10 mg IV dexamethasone after testing positive for COVID-19 PCR.  Chest x-ray does not show any infiltrate.  She was placed on BiPAP for her COPD exacerbation and is feeling better.  She does not have a leukocytosis, fever, or elevated procalcitonin/lactic acid.  She has had COVID-19 before and is vaccinated.  She did require intubation in January for COPD and also required a tracheostomy a little over a year ago that has since been decannulated.  She does use 3 L oxygen at home and chronically has a mildly elevated CO2 on previous blood gases as she does now with pH 7.33, pCO2 51, pO2 66, bicarb 27.  Significantly improved with BiPAP. Last ABG 7.36/47/110/26. Tolerated HFNC 8/10 but put back on BiPAP for sleep per patient preference.   -IV dexamethasone 6 mg every 24  "hours, completed 5 days  -IV Remdesivir day 4/5  -continue 3% saline nebs twice daily, albuterol nebs as needed  -HFNC while awake, wean as able  -BiPAP when sleeping, consider BiPAP on discharge  -Acetaminophen as needed for headache and fevers  -Continue PTA azithromycin 250 mg every MWF  change from DuoNebs 4 times daily to Xopenex and add Spiriva  Plan to start prednisone taper at 40 mg daily on 8/13     MDD  Anxiety  Having significant anxiety 2/2 dyspnea. PTA on PRN clonazepam, which she usually takes ~1x/day.   - Resume PTA paroxetine   - PRN ativan 0.5mg q4h PRN      Paroxysmal A-fib  HTN  EKG shows NSR.    -- Continue Eliquis, metoprolol, and diltiazem  Prn hydralazine for elevated BP     HLD:   -- continue PTA atorvastatin.     Hypothyroidism:   -- continue PTA levothyroxine.     Chronic anemia:   -- Hemoglobin at baseline of 10-11.     Colostomy: Previous large bowel obstruction requiring diverting colostomy.  As above there were plans to have a takedown surgery once lung status was optimized, however this was canceled.  -WOC RN consulted for colostomy and recommended CRS involvement.   -Colorectal to carry to the operating room yesterday, close colostomy on the left lower quadrant, moved the colostomy to the right lower quadrant and performed herniorrhaphy.        Diet: Clear Liquid Diet  Snacks/Supplements Adult: Gelatein Plus; Between Meals    DVT Prophylaxis: DOAC  Hines Catheter: Not present  Lines: None     Cardiac Monitoring: None  Code Status: Full Code      Clinically Significant Risk Factors                  # Hypertension: Noted on problem list           # Overweight: Estimated body mass index is 25.91 kg/m  as calculated from the following:    Height as of this encounter: 1.613 m (5' 3.5\").    Weight as of this encounter: 67.4 kg (148 lb 9.4 oz).      # COPD: noted on problem list       Disposition Plan     Medically Ready for Discharge: Anticipated in 2-4 Days        Geovani Baker, " MD  Hospitalist Service  [facility]  Securely message with Glad[employer] (more info)  Text page via [employer] Paging/Directory   ______________________________________________________________________    Interval History   She is better respiratory wise, on low-flow nasal cannula not symptomatic at the moment.abdomen tenderness, no pain.  Hungry, tolerating diet and asking for more.    Physical Exam   Vital Signs: Temp: 97.3  F (36.3  C) Temp src: Temporal BP: 129/74 Pulse: 56   Resp: 17 SpO2: 94 % O2 Device: None (Room air) Oxygen Delivery: 1 LPM  Weight: 148 lbs 9.44 oz    GEN:  Alert, oriented x 3, appears comfortable, NAD.  HEENT:  Normocephalic/atraumatic, no scleral icterus, no nasal discharge, mouth moist.  CV:  Regular rate and rhythm, no murmur or JVD.  S1 + S2 noted, no S3 or S4.  LUNGS: Coarse sounds, diminished to auscultation bilaterally with rhonchi/wheezing/crackles.  Symmetric chest rise on inhalation noted.  ABD:  Active bowel sounds, soft, non-tender/non-distended.  No rebound/guarding/rigidity.  EXT:  No edema or cyanosis.  No joint synovitis noted.  SKIN:  Dry to touch, no exanthems noted in the visualized areas.         Medical Decision Making       42 MINUTES SPENT BY ME on the date of service doing chart review, history, exam, documentation & further activities per the note.      Data     I have personally reviewed the following data over the past 24 hrs:    15.2 (H)  \   11.2 (L)   / 276     139; 139 101; 101 22.9 /  141 (H)   4.8; 4.8 26; 26 0.68 \       Imaging results reviewed over the past 24 hrs:   No results found for this or any previous visit (from the past 24 hour(s)).

## 2024-08-17 LAB
ANION GAP SERPL CALCULATED.3IONS-SCNC: 9 MMOL/L (ref 7–15)
BASOPHILS # BLD AUTO: 0 10E3/UL (ref 0–0.2)
BASOPHILS NFR BLD AUTO: 0 %
BUN SERPL-MCNC: 16.5 MG/DL (ref 8–23)
CALCIUM SERPL-MCNC: 8.3 MG/DL (ref 8.8–10.4)
CHLORIDE SERPL-SCNC: 102 MMOL/L (ref 98–107)
CREAT SERPL-MCNC: 0.72 MG/DL (ref 0.51–0.95)
EGFRCR SERPLBLD CKD-EPI 2021: >90 ML/MIN/1.73M2
EOSINOPHIL # BLD AUTO: 0 10E3/UL (ref 0–0.7)
EOSINOPHIL NFR BLD AUTO: 0 %
ERYTHROCYTE [DISTWIDTH] IN BLOOD BY AUTOMATED COUNT: 13.4 % (ref 10–15)
GLUCOSE SERPL-MCNC: 116 MG/DL (ref 70–99)
HCO3 SERPL-SCNC: 27 MMOL/L (ref 22–29)
HCT VFR BLD AUTO: 31.7 % (ref 35–47)
HGB BLD-MCNC: 10.2 G/DL (ref 11.7–15.7)
IMM GRANULOCYTES # BLD: 0.2 10E3/UL
IMM GRANULOCYTES NFR BLD: 2 %
LYMPHOCYTES # BLD AUTO: 1.1 10E3/UL (ref 0.8–5.3)
LYMPHOCYTES NFR BLD AUTO: 10 %
MCH RBC QN AUTO: 29 PG (ref 26.5–33)
MCHC RBC AUTO-ENTMCNC: 32.2 G/DL (ref 31.5–36.5)
MCV RBC AUTO: 90 FL (ref 78–100)
MONOCYTES # BLD AUTO: 0.9 10E3/UL (ref 0–1.3)
MONOCYTES NFR BLD AUTO: 8 %
NEUTROPHILS # BLD AUTO: 8.5 10E3/UL (ref 1.6–8.3)
NEUTROPHILS NFR BLD AUTO: 80 %
NRBC # BLD AUTO: 0 10E3/UL
NRBC BLD AUTO-RTO: 0 /100
PLATELET # BLD AUTO: 192 10E3/UL (ref 150–450)
POTASSIUM SERPL-SCNC: 4.2 MMOL/L (ref 3.4–5.3)
RBC # BLD AUTO: 3.52 10E6/UL (ref 3.8–5.2)
SODIUM SERPL-SCNC: 138 MMOL/L (ref 135–145)
WBC # BLD AUTO: 10.7 10E3/UL (ref 4–11)

## 2024-08-17 PROCEDURE — 80048 BASIC METABOLIC PNL TOTAL CA: CPT | Performed by: HOSPITALIST

## 2024-08-17 PROCEDURE — 94640 AIRWAY INHALATION TREATMENT: CPT

## 2024-08-17 PROCEDURE — 99232 SBSQ HOSP IP/OBS MODERATE 35: CPT | Performed by: HOSPITALIST

## 2024-08-17 PROCEDURE — 250N000009 HC RX 250: Performed by: INTERNAL MEDICINE

## 2024-08-17 PROCEDURE — 250N000012 HC RX MED GY IP 250 OP 636 PS 637: Performed by: INTERNAL MEDICINE

## 2024-08-17 PROCEDURE — 94640 AIRWAY INHALATION TREATMENT: CPT | Mod: 76

## 2024-08-17 PROCEDURE — 120N000001 HC R&B MED SURG/OB

## 2024-08-17 PROCEDURE — 999N000157 HC STATISTIC RCP TIME EA 10 MIN

## 2024-08-17 PROCEDURE — 36415 COLL VENOUS BLD VENIPUNCTURE: CPT | Performed by: HOSPITALIST

## 2024-08-17 PROCEDURE — 250N000013 HC RX MED GY IP 250 OP 250 PS 637: Performed by: STUDENT IN AN ORGANIZED HEALTH CARE EDUCATION/TRAINING PROGRAM

## 2024-08-17 PROCEDURE — 94799 UNLISTED PULMONARY SVC/PX: CPT

## 2024-08-17 PROCEDURE — 250N000011 HC RX IP 250 OP 636: Performed by: PHYSICIAN ASSISTANT

## 2024-08-17 PROCEDURE — 250N000013 HC RX MED GY IP 250 OP 250 PS 637: Performed by: INTERNAL MEDICINE

## 2024-08-17 PROCEDURE — 85025 COMPLETE CBC W/AUTO DIFF WBC: CPT | Performed by: HOSPITALIST

## 2024-08-17 RX ORDER — PREDNISONE 5 MG/1
5 TABLET ORAL DAILY
Status: DISCONTINUED | OUTPATIENT
Start: 2024-09-01 | End: 2024-08-19 | Stop reason: HOSPADM

## 2024-08-17 RX ORDER — PREDNISONE 20 MG/1
20 TABLET ORAL DAILY
Status: DISCONTINUED | OUTPATIENT
Start: 2024-08-24 | End: 2024-08-19 | Stop reason: HOSPADM

## 2024-08-17 RX ORDER — PREDNISONE 10 MG/1
10 TABLET ORAL DAILY
Status: DISCONTINUED | OUTPATIENT
Start: 2024-08-28 | End: 2024-08-19 | Stop reason: HOSPADM

## 2024-08-17 RX ORDER — PREDNISONE 20 MG/1
40 TABLET ORAL DAILY
Status: COMPLETED | OUTPATIENT
Start: 2024-08-18 | End: 2024-08-19

## 2024-08-17 RX ORDER — NALOXONE HYDROCHLORIDE 0.4 MG/ML
0.4 INJECTION, SOLUTION INTRAMUSCULAR; INTRAVENOUS; SUBCUTANEOUS
Status: DISCONTINUED | OUTPATIENT
Start: 2024-08-17 | End: 2024-08-19 | Stop reason: HOSPADM

## 2024-08-17 RX ORDER — NALOXONE HYDROCHLORIDE 0.4 MG/ML
0.2 INJECTION, SOLUTION INTRAMUSCULAR; INTRAVENOUS; SUBCUTANEOUS
Status: DISCONTINUED | OUTPATIENT
Start: 2024-08-17 | End: 2024-08-19 | Stop reason: HOSPADM

## 2024-08-17 RX ADMIN — ENOXAPARIN SODIUM 40 MG: 40 INJECTION SUBCUTANEOUS at 13:39

## 2024-08-17 RX ADMIN — METOPROLOL TARTRATE 37.5 MG: 25 TABLET, FILM COATED ORAL at 08:57

## 2024-08-17 RX ADMIN — DILTIAZEM HYDROCHLORIDE 240 MG: 240 CAPSULE, COATED, EXTENDED RELEASE ORAL at 08:57

## 2024-08-17 RX ADMIN — ACETAMINOPHEN 650 MG: 325 TABLET, FILM COATED ORAL at 16:09

## 2024-08-17 RX ADMIN — LEVALBUTEROL HYDROCHLORIDE 0.63 MG: 0.63 SOLUTION RESPIRATORY (INHALATION) at 15:44

## 2024-08-17 RX ADMIN — ATORVASTATIN CALCIUM 20 MG: 20 TABLET, FILM COATED ORAL at 08:57

## 2024-08-17 RX ADMIN — METHOCARBAMOL 1000 MG: 500 TABLET ORAL at 08:57

## 2024-08-17 RX ADMIN — METHOCARBAMOL 1000 MG: 500 TABLET ORAL at 19:44

## 2024-08-17 RX ADMIN — SODIUM CHLORIDE SOLN NEBU 3% 4 ML: 3 NEBU SOLN at 08:09

## 2024-08-17 RX ADMIN — FLUTICASONE FUROATE AND VILANTEROL 1 PUFF: 200; 25 POWDER RESPIRATORY (INHALATION) at 08:09

## 2024-08-17 RX ADMIN — LEVALBUTEROL HYDROCHLORIDE 0.63 MG: 0.63 SOLUTION RESPIRATORY (INHALATION) at 19:29

## 2024-08-17 RX ADMIN — SODIUM CHLORIDE SOLN NEBU 3% 4 ML: 3 NEBU SOLN at 19:29

## 2024-08-17 RX ADMIN — ACETAMINOPHEN 650 MG: 325 TABLET, FILM COATED ORAL at 03:58

## 2024-08-17 RX ADMIN — ACETAMINOPHEN 650 MG: 325 TABLET, FILM COATED ORAL at 09:02

## 2024-08-17 RX ADMIN — LEVALBUTEROL HYDROCHLORIDE 0.63 MG: 0.63 SOLUTION RESPIRATORY (INHALATION) at 12:26

## 2024-08-17 RX ADMIN — LEVOTHYROXINE SODIUM 112 MCG: 0.11 TABLET ORAL at 08:57

## 2024-08-17 RX ADMIN — LEVALBUTEROL HYDROCHLORIDE 0.63 MG: 0.63 SOLUTION RESPIRATORY (INHALATION) at 08:09

## 2024-08-17 RX ADMIN — METHOCARBAMOL 1000 MG: 500 TABLET ORAL at 13:38

## 2024-08-17 RX ADMIN — UMECLIDINIUM 1 PUFF: 62.5 AEROSOL, POWDER ORAL at 08:09

## 2024-08-17 RX ADMIN — ACETAMINOPHEN 650 MG: 325 TABLET, FILM COATED ORAL at 21:22

## 2024-08-17 RX ADMIN — PREDNISONE 40 MG: 20 TABLET ORAL at 08:57

## 2024-08-17 RX ADMIN — METOPROLOL TARTRATE 37.5 MG: 25 TABLET, FILM COATED ORAL at 21:21

## 2024-08-17 RX ADMIN — PAROXETINE HYDROCHLORIDE 20 MG: 20 TABLET, FILM COATED ORAL at 08:57

## 2024-08-17 ASSESSMENT — ACTIVITIES OF DAILY LIVING (ADL)
ADLS_ACUITY_SCORE: 28

## 2024-08-17 NOTE — PLAN OF CARE
"Goal Outcome Evaluation:    VS stable. On room air, 02 sats in 90s. A&Ox4. Stby assist. No IV access, provider notified and pt has PO predisone scheduled for tomorrow. Pain is controlled with Tylenol and Robaxin. Dressing change saturated by end of shift, dressings changed, currently CDI. Minimal output from ostomy. Special precautions maintained. Tolerating clear liquid diet well.   Problem: Adult Inpatient Plan of Care  Goal: Plan of Care Review  Description: The Plan of Care Review/Shift note should be completed every shift.  The Outcome Evaluation is a brief statement about your assessment that the patient is improving, declining, or no change.  This information will be displayed automatically on your shift  note.  Outcome: Progressing  Flowsheets (Taken 8/16/2024 2342)  Plan of Care Reviewed With: patient  Overall Patient Progress: improving  Goal: Patient-Specific Goal (Individualized)  Description: You can add care plan individualizations to a care plan. Examples of Individualization might be:  \"Parent requests to be called daily at 9am for status\", \"I have a hard time hearing out of my right ear\", or \"Do not touch me to wake me up as it startles  me\".  Outcome: Progressing  Goal: Absence of Hospital-Acquired Illness or Injury  Outcome: Progressing  Intervention: Identify and Manage Fall Risk  Recent Flowsheet Documentation  Taken 8/16/2024 1800 by Jasmina Griffiths RN  Safety Promotion/Fall Prevention: activity supervised  Intervention: Prevent Skin Injury  Recent Flowsheet Documentation  Taken 8/16/2024 1615 by Jasmina Griffiths, RN  Body Position: supine, head elevated  Intervention: Prevent and Manage VTE (Venous Thromboembolism) Risk  Recent Flowsheet Documentation  Taken 8/16/2024 1800 by Jasmina Griffiths RN  VTE Prevention/Management: SCDs on (sequential compression devices)  Intervention: Prevent Infection  Recent Flowsheet Documentation  Taken 8/16/2024 1800 by Jasmina Griffiths RN  Infection " Prevention:   hand hygiene promoted   rest/sleep promoted   single patient room provided  Goal: Optimal Comfort and Wellbeing  Outcome: Progressing  Intervention: Monitor Pain and Promote Comfort  Recent Flowsheet Documentation  Taken 8/16/2024 1615 by Jasmina Griffiths RN  Pain Management Interventions: medication (see MAR)  Goal: Readiness for Transition of Care  Outcome: Progressing     Problem: Skin Injury Risk Increased  Goal: Skin Health and Integrity  Outcome: Progressing  Intervention: Plan: Nurse Driven Intervention: Moisture Management  Recent Flowsheet Documentation  Taken 8/16/2024 1800 by Jasmina Griffiths RN  Moisture Interventions: Encourage regular toileting  Intervention: Optimize Skin Protection  Recent Flowsheet Documentation  Taken 8/16/2024 1615 by Jasmina Griffiths RN  Activity Management: activity adjusted per tolerance  Head of Bed (HOB) Positioning: HOB at 30-45 degrees     Problem: Infection  Goal: Absence of Infection Signs and Symptoms  Outcome: Progressing  Intervention: Prevent or Manage Infection  Recent Flowsheet Documentation  Taken 8/16/2024 1800 by Jasmina Griffiths RN  Infection Management: aseptic technique maintained  Isolation Precautions: special precautions maintained     Problem: COPD (Chronic Obstructive Pulmonary Disease)  Goal: Optimal Chronic Illness Coping  Outcome: Progressing  Goal: Optimal Level of Functional [place]  Outcome: Progressing  Intervention: Optimize Functional Ability  Recent Flowsheet Documentation  Taken 8/16/2024 1615 by Jasmina Griffiths RN  Activity Management: activity adjusted per tolerance  Goal: Absence of Infection Signs and Symptoms  Outcome: Progressing  Intervention: Prevent or Manage Infection  Recent Flowsheet Documentation  Taken 8/16/2024 1800 by Jasmina Griffiths RN  Infection Management: aseptic technique maintained  Isolation Precautions: special precautions maintained  Goal: Improved Oral Intake  Outcome: Progressing  Goal:  Effective Oxygenation and Ventilation  Outcome: Progressing  Intervention: Promote Airway Secretion Clearance  Recent Flowsheet Documentation  Taken 8/16/2024 1615 by Jasmina Griffiths RN  Activity Management: activity adjusted per tolerance  Intervention: Optimize Oxygenation and Ventilation  Recent Flowsheet Documentation  Taken 8/16/2024 1615 by Jasmina Griffiths RN  Head of Bed (HOB) Positioning: HOB at 30-45 degrees     Problem: Comorbidity Management  Goal: Maintenance of COPD Symptom Control  Outcome: Progressing  Intervention: Maintain COPD Symptom Control  Recent Flowsheet Documentation  Taken 8/16/2024 1800 by Jasmina Griffiths RN  Medication Review/Management: medications reviewed  Goal: Blood Pressure in Desired Range  Outcome: Progressing  Intervention: Maintain Blood Pressure Management  Recent Flowsheet Documentation  Taken 8/16/2024 1800 by Jasmina Griffiths RN  Medication Review/Management: medications reviewed     Problem: Hernia Repair  Goal: Absence of Bleeding  Outcome: Progressing  Intervention: Monitor and Manage Bleeding  Recent Flowsheet Documentation  Taken 8/16/2024 1800 by Jasmina Griffiths RN  Bleeding Management: dressing monitored  Goal: Effective Bowel Elimination  Outcome: Progressing  Goal: Fluid and Electrolyte Balance  Outcome: Progressing  Goal: Absence of Infection Signs and Symptoms  Outcome: Progressing  Intervention: Prevent or Manage Infection  Recent Flowsheet Documentation  Taken 8/16/2024 1800 by Jasmina Griffiths RN  Infection Management: aseptic technique maintained  Isolation Precautions: special precautions maintained  Goal: Anesthesia/Sedation Recovery  Outcome: Progressing  Intervention: Optimize Anesthesia Recovery  Recent Flowsheet Documentation  Taken 8/16/2024 1800 by Jasmina Griffiths RN  Safety Promotion/Fall Prevention: activity supervised  Administration (IS): instruction provided, follow-up  Number of Repetitions (IS): 5  Patient Tolerance (IS): fair  Goal:  Optimal Pain Control and Function  Outcome: Progressing  Intervention: Prevent or Manage Pain  Recent Flowsheet Documentation  Taken 8/16/2024 1615 by Jasmina Griffiths, RN  Pain Management Interventions: medication (see MAR)  Goal: Nausea and Vomiting Relief  Outcome: Progressing  Goal: Effective Urinary Elimination  Outcome: Progressing  Goal: Effective Oxygenation and Ventilation  Outcome: Progressing  Intervention: Optimize Oxygenation and Ventilation  Recent Flowsheet Documentation  Taken 8/16/2024 1615 by Jasmina Griffiths, RN  Head of Bed (HOB) Positioning: HOB at 30-45 degrees         Plan of Care Reviewed With: patient    Overall Patient Progress: improvingOverall Patient Progress: improving

## 2024-08-17 NOTE — PLAN OF CARE
Goal Outcome Evaluation:      Plan of Care Reviewed With: patient          Outcome Evaluation: A&Ox4. C/o intermittent abd pain - adequately relieved with scheduled medications. Pt declining additional PRN options. Lung sounds dimimnished - continues neb treatments - weaned off O2 during day O2 saturation 85-94%. Up with SBA in room - dyspnea observed with activity. Voiding adequately in commode. Pt tolerating clear liquids - hypoactive bowel sounds - denying nausea. Only small amounts of air observed in revised stoma. Abdominal dressing with moderate drainage - dressing changed. Remains without PIV access - approved by hospitalist at this time. May require PICC/midline if needed. Plan to continue to await return of bowel function. Hopeful discharge to home in coming days.      Problem: Adult Inpatient Plan of Care  Goal: Plan of Care Review  Description: The Plan of Care Review/Shift note should be completed every shift.  The Outcome Evaluation is a brief statement about your assessment that the patient is improving, declining, or no change.  This information will be displayed automatically on your shift  note.  Outcome: Progressing  Flowsheets (Taken 8/17/2024 1352)  Outcome Evaluation: A&Ox4. C/o intermittent abd pain - adequately relieved with scheduled medications. Pt declining additional PRN options. Lung sounds dimimnished - continues neb treatments - weaned off O2 during day O2 saturation 85-94%. Up with SBA in room - dyspnea observed with activity. Voiding adequately in commode. Pt tolerating clear liquids - hypoactive bowel sounds - denying nausea. Only small amounts of air observed in revised stoma. Abdominal dressing with moderate drainage - dressing changed. Remains without PIV access - approved by hospitalist at this time. May require PICC/midline if needed. Plan to continue to await return of bowel function. Hopeful discharge to home in coming days.  Plan of Care Reviewed With: patient  Goal:  "Patient-Specific Goal (Individualized)  Description: You can add care plan individualizations to a care plan. Examples of Individualization might be:  \"Parent requests to be called daily at 9am for status\", \"I have a hard time hearing out of my right ear\", or \"Do not touch me to wake me up as it startles  me\".  Outcome: Progressing  Goal: Absence of Hospital-Acquired Illness or Injury  Outcome: Progressing  Intervention: Identify and Manage Fall Risk  Recent Flowsheet Documentation  Taken 8/17/2024 1000 by Sandro Pantoja RN  Safety Promotion/Fall Prevention:   activity supervised   patient and family education  Intervention: Prevent Skin Injury  Recent Flowsheet Documentation  Taken 8/17/2024 1000 by Sandro Pantoja RN  Body Position: position changed independently  Goal: Optimal Comfort and Wellbeing  Outcome: Progressing  Intervention: Monitor Pain and Promote Comfort  Recent Flowsheet Documentation  Taken 8/17/2024 1339 by Sandro Pantoja RN  Pain Management Interventions:   medication (see MAR)   repositioned  Taken 8/17/2024 0902 by Sandro Pantoja RN  Pain Management Interventions:   medication (see MAR)   medication offered but refused  Intervention: Provide Person-Centered Care  Recent Flowsheet Documentation  Taken 8/17/2024 1000 by Sandro Pantoja RN  Trust Relationship/Rapport: care explained  Goal: Readiness for Transition of Care  Outcome: Progressing     "

## 2024-08-17 NOTE — PROGRESS NOTES
COLON & RECTAL SURGERY  PROGRESS NOTE    August 17, 2024  Post-op Day # 2    SUBJECTIVE:  Tolerated clear liquid diet without nausea or vomiting. Abdominal pain controlled. No output from stoma yet.    OBJECTIVE:  Temp:  [97  F (36.1  C)-97.9  F (36.6  C)] 97  F (36.1  C)  Pulse:  [56-62] 62  Resp:  [16-21] 20  BP: (129-166)/(71-83) 147/81  SpO2:  [91 %-97 %] 92 %      Intake/Output Summary (Last 24 hours) at 8/17/2024 0948  Last data filed at 8/17/2024 0900  Gross per 24 hour   Intake 240 ml   Output 500 ml   Net -260 ml       GENERAL:  Awake, alert, no acute distress, lying in bed  HEAD: Nomocephalic atraumatic  SCLERA: anicteric  EXTREMITIES: warm and well perfused  ABDOMEN:  Soft, appropriately tender, mildly-distended, no rebound or guarding, no peritoneal signs. Stoma and mucous fistula pink and viable.   INCISION:  Clean and dry with penrose in prior stoma site.    LABS:  Lab Results   Component Value Date    WBC 10.7 08/17/2024    WBC 6.2 02/02/2006     Lab Results   Component Value Date    RBC 3.52 08/17/2024    RBC 2.86 02/02/2006     Lab Results   Component Value Date    HGB 10.2 08/17/2024    HGB 10.8 02/02/2006     Lab Results   Component Value Date    HCT 31.7 08/17/2024    HCT 30.4 02/02/2006     Lab Results   Component Value Date    MCV 90 08/17/2024     02/02/2006     Lab Results   Component Value Date    MCH 29.0 08/17/2024    MCH 37.7 02/02/2006     Lab Results   Component Value Date    MCHC 32.2 08/17/2024    MCHC 35.4 02/02/2006     Lab Results   Component Value Date    RDW 13.4 08/17/2024    RDW 13.5 02/02/2006     Lab Results   Component Value Date     08/17/2024     02/02/2006       Last Comprehensive Metabolic Panel:  Lab Results   Component Value Date     08/17/2024    POTASSIUM 4.2 08/17/2024    CHLORIDE 102 08/17/2024    CO2 27 08/17/2024    ANIONGAP 9 08/17/2024     (H) 08/17/2024    BUN 16.5 08/17/2024    CR 0.72 08/17/2024    GFRESTIMATED >90  08/17/2024    EDIN 8.3 (L) 08/17/2024         ASSESSMENT/PLAN: Lara Melendez is a 58-year-old woman with severe COPD, pAfib, and hx of transverse loop colostomy for LBO, admitted with COVID-19 and acute COPD exacerbation, now POD#2 s/p stoma revision, parastomal hernia repair and small bowel resection for stoma prolapse, recovering appropriately.    - Continue clear liquid diet  - Pain management, minimize narcotics  - Encourage ambulation 4x daily  - Daily dressing changes  - WOCN for stoma cares and teaching  - Lovenox for DVT ppx if labs stable      For questions/paging, please contact the CRS office at [phone].    Sergio Greene MD  Fellow, Colon & Rectal Surgery  [facility]  08/17/2024  9:49 AM    Colorectal Surgery can be reached at [phone] at all times. Between 5pm and 7am you will be connected to the on-call physician

## 2024-08-17 NOTE — PROGRESS NOTES
[facility]    Medicine Progress Note - Hospitalist Service    Date of Admission:  8/8/2024    Assessment & Plan   Lara Melendez is a 58 year old female with PMH of severe COPD on 2-3L O2 requiring previous tracheostomy, paroxysmal Afib on eliquis, HTN, HLD, hypothyroidism, anemia, MDD, anxiety, and large bowel obstruction requiring ostomy placement, who was admitted on 8/8/2024 with acute on chronic hypoxemic and hypercapnic respiratory failure 2/2 COPD exacerbation and COVID infection.       COVID-19 infection  Acute on chronic hypoxemic and hypercapnic respiratory failure  Severe COPD with acute exacerbation  She has severe COPD at baseline and follows closely with pulmonology.  She is on numerous inhalers at baseline.  She has been receiving prednisone to try to optimize her lung function prior to having an ostomy takedown and she was taking 10 mg daily although took 30 mg this morning.  For the last 24 to 48 hours she has had headache, fatigue, and progressively worsening shortness of breath with wheezing despite using nebulizer treatments.  In the ER she was in respiratory distress and received further nebs, magnesium, and 10 mg IV dexamethasone after testing positive for COVID-19 PCR.  Chest x-ray does not show any infiltrate.  She was placed on BiPAP for her COPD exacerbation and is feeling better.  She does not have a leukocytosis, fever, or elevated procalcitonin/lactic acid.  She has had COVID-19 before and is vaccinated.  She did require intubation in January for COPD and also required a tracheostomy a little over a year ago that has since been decannulated.  She does use 3 L oxygen at home and chronically has a mildly elevated CO2 on previous blood gases as she does now with pH 7.33, pCO2 51, pO2 66, bicarb 27.  Significantly improved with BiPAP. Last ABG 7.36/47/110/26. Tolerated HFNC 8/10 but put back on BiPAP for sleep per patient preference.   -IV dexamethasone 6 mg every 24  "hours, completed 5 days  -IV Remdesivir day 4/5  -continue 3% saline nebs twice daily, albuterol nebs as needed  -HFNC while awake, wean as able  -BiPAP when sleeping, consider BiPAP on discharge  -Acetaminophen as needed for headache and fevers  -Continue PTA azithromycin 250 mg every MWF  change from DuoNebs 4 times daily to Xopenex and add Spiriva  Prednisone taper at 40 mg daily       MDD  Anxiety  Having significant anxiety 2/2 dyspnea. PTA on PRN clonazepam, which she usually takes ~1x/day.   - Resume PTA paroxetine   - PRN ativan 0.5mg q4h PRN      Paroxysmal A-fib  HTN  EKG shows NSR.    -- Continue Eliquis, metoprolol, and diltiazem  Prn hydralazine for elevated BP     HLD:   -- continue PTA atorvastatin.     Hypothyroidism:   -- continue PTA levothyroxine.     Chronic anemia:   -- Hemoglobin at baseline of 10-11.     Colostomy: Previous large bowel obstruction requiring diverting colostomy.  As above there were plans to have a takedown surgery once lung status was optimized, however this was canceled.  -WOC RN consulted for colostomy and recommended CRS involvement.   -Colorectal took her to operating room 8/15, closed colostomy on the left lower quadrant, moved the colostomy to the right lower quadrant and performed herniorrhaphy.        Diet: Clear Liquid Diet  Snacks/Supplements Adult: Gelatein Plus; Between Meals    DVT Prophylaxis: Lovenox   Hines Catheter: Not present  Lines: None     Cardiac Monitoring: None  Code Status: Full Code      Clinically Significant Risk Factors                  # Hypertension: Noted on problem list           # Overweight: Estimated body mass index is 25.91 kg/m  as calculated from the following:    Height as of this encounter: 1.613 m (5' 3.5\").    Weight as of this encounter: 67.4 kg (148 lb 9.4 oz).      # COPD: noted on problem list       Disposition Plan     Medically Ready for Discharge: Anticipated in 2-4 Days        Geovani Baker MD  Hospitalist Service  [facility]  Securely message with Gladys (more info)  Text page via [facility] [employer] Paging/Directory   ______________________________________________________________________    Interval History   She is better respiratory wise, alternating room air with low-flow nasal cannula.   Not symptomatic at the moment.abdomen tenderness, no pain.  Tolerating diet.  Waiting for CRS increases level of activity    Physical Exam   Vital Signs: Temp: 97  F (36.1  C) Temp src: Temporal BP: (!) 147/81 Pulse: 62   Resp: 20 SpO2: 92 % O2 Device: None (Room air) Oxygen Delivery: 1 LPM  Weight: 148 lbs 9.44 oz    GEN:  Alert, oriented x 3, appears comfortable, NAD.  HEENT:  Normocephalic/atraumatic, no scleral icterus, no nasal discharge, mouth moist.  CV:  Regular rate and rhythm, no murmur or JVD.  S1 + S2 noted, no S3 or S4.  LUNGS: Coarse sounds, diminished to auscultation bilaterally with rhonchi/wheezing/crackles.  Symmetric chest rise on inhalation noted.  ABD:  Active bowel sounds, soft, non-tender/non-distended.  No rebound/guarding/rigidity.  EXT:  No edema or cyanosis.  No joint synovitis noted.  SKIN:  Dry to touch, no exanthems noted in the visualized areas.         Medical Decision Making       42 MINUTES SPENT BY ME on the date of service doing chart review, history, exam, documentation & further activities per the note.      Data     I have personally reviewed the following data over the past 24 hrs:    10.7  \   10.2 (L)   / 192     138 102 16.5 /  116 (H)   4.2 27 0.72 \       Imaging results reviewed over the past 24 hrs:   No results found for this or any previous visit (from the past 24 hour(s)).

## 2024-08-17 NOTE — PLAN OF CARE
"Orientation: AAOx4, VSS  Pain: pain is managed with scheduled pain medication.  O2: RA  GI/: Ambulates to restroom:  Skin: ostomy on the right side. CDI  Activity: Stand by assist.  Diet: clear liquid diet  Protocols: none   Major shift events:  Plan:  Problem: Adult Inpatient Plan of Care  Goal: Plan of Care Review  Description: The Plan of Care Review/Shift note should be completed every shift.  The Outcome Evaluation is a brief statement about your assessment that the patient is improving, declining, or no change.  This information will be displayed automatically on your shift  note.  Outcome: Progressing  Flowsheets (Taken 8/17/2024 0055)  Plan of Care Reviewed With: patient  Overall Patient Progress: improving  Goal: Patient-Specific Goal (Individualized)  Description: You can add care plan individualizations to a care plan. Examples of Individualization might be:  \"Parent requests to be called daily at 9am for status\", \"I have a hard time hearing out of my right ear\", or \"Do not touch me to wake me up as it startles  me\".  Outcome: Progressing  Goal: Absence of Hospital-Acquired Illness or Injury  Outcome: Progressing  Intervention: Identify and Manage Fall Risk  Recent Flowsheet Documentation  Taken 8/17/2024 0020 by Juan Cates RN  Safety Promotion/Fall Prevention: activity supervised  Intervention: Prevent and Manage VTE (Venous Thromboembolism) Risk  Recent Flowsheet Documentation  Taken 8/17/2024 0020 by Juan Cates RN  VTE Prevention/Management: SCDs on (sequential compression devices)  Intervention: Prevent Infection  Recent Flowsheet Documentation  Taken 8/17/2024 0020 by Juan Cates RN  Infection Prevention:   hand hygiene promoted   rest/sleep promoted   single patient room provided  Goal: Optimal Comfort and Wellbeing  Outcome: Progressing  Goal: Readiness for Transition of Care  Outcome: Progressing     Problem: Skin Injury Risk Increased  Goal: Skin Health and Integrity  Outcome: " Progressing  Intervention: Plan: Nurse Driven Intervention: Moisture Management  Recent Flowsheet Documentation  Taken 8/17/2024 0020 by Juan Cates RN  Moisture Interventions: Encourage regular toileting     Problem: Infection  Goal: Absence of Infection Signs and Symptoms  Outcome: Progressing  Intervention: Prevent or Manage Infection  Recent Flowsheet Documentation  Taken 8/17/2024 0020 by Juan Cates RN  Infection Management: aseptic technique maintained  Isolation Precautions: special precautions maintained     Problem: COPD (Chronic Obstructive Pulmonary Disease)  Goal: Optimal Chronic Illness Coping  Outcome: Progressing  Goal: Optimal Level of Functional [place]  Outcome: Progressing  Goal: Absence of Infection Signs and Symptoms  Outcome: Progressing  Intervention: Prevent or Manage Infection  Recent Flowsheet Documentation  Taken 8/17/2024 0020 by Juan Cates RN  Infection Management: aseptic technique maintained  Isolation Precautions: special precautions maintained  Goal: Improved Oral Intake  Outcome: Progressing  Goal: Effective Oxygenation and Ventilation  Outcome: Progressing     Problem: Comorbidity Management  Goal: Maintenance of COPD Symptom Control  Outcome: Progressing  Intervention: Maintain COPD Symptom Control  Recent Flowsheet Documentation  Taken 8/17/2024 0020 by Juan Cates RN  Medication Review/Management: medications reviewed  Goal: Blood Pressure in Desired Range  Outcome: Progressing  Intervention: Maintain Blood Pressure Management  Recent Flowsheet Documentation  Taken 8/17/2024 0020 by Juan Cates RN  Medication Review/Management: medications reviewed     Problem: Hernia Repair  Goal: Absence of Bleeding  Outcome: Progressing  Intervention: Monitor and Manage Bleeding  Recent Flowsheet Documentation  Taken 8/17/2024 0020 by Juan Cates RN  Bleeding Management: dressing monitored  Goal: Effective Bowel Elimination  Outcome: Progressing  Goal: Fluid and  Electrolyte Balance  Outcome: Progressing  Goal: Absence of Infection Signs and Symptoms  Outcome: Progressing  Intervention: Prevent or Manage Infection  Recent Flowsheet Documentation  Taken 8/17/2024 0020 by Juan Cates RN  Infection Management: aseptic technique maintained  Isolation Precautions: special precautions maintained  Goal: Anesthesia/Sedation Recovery  Outcome: Progressing  Intervention: Optimize Anesthesia Recovery  Recent Flowsheet Documentation  Taken 8/17/2024 0020 by Juan Cates RN  Safety Promotion/Fall Prevention: activity supervised  Goal: Optimal Pain Control and Function  Outcome: Progressing  Goal: Nausea and Vomiting Relief  Outcome: Progressing  Goal: Effective Urinary Elimination  Outcome: Progressing  Goal: Effective Oxygenation and Ventilation  Outcome: Progressing   Goal Outcome Evaluation:      Plan of Care Reviewed With: patient    Overall Patient Progress: improvingOverall Patient Progress: improving

## 2024-08-18 LAB
ANION GAP SERPL CALCULATED.3IONS-SCNC: 9 MMOL/L (ref 7–15)
BASOPHILS # BLD AUTO: 0 10E3/UL (ref 0–0.2)
BASOPHILS NFR BLD AUTO: 0 %
BUN SERPL-MCNC: 10.4 MG/DL (ref 8–23)
CALCIUM SERPL-MCNC: 8.3 MG/DL (ref 8.8–10.4)
CHLORIDE SERPL-SCNC: 101 MMOL/L (ref 98–107)
CREAT SERPL-MCNC: 0.75 MG/DL (ref 0.51–0.95)
EGFRCR SERPLBLD CKD-EPI 2021: >90 ML/MIN/1.73M2
EOSINOPHIL # BLD AUTO: 0 10E3/UL (ref 0–0.7)
EOSINOPHIL NFR BLD AUTO: 0 %
ERYTHROCYTE [DISTWIDTH] IN BLOOD BY AUTOMATED COUNT: 13.2 % (ref 10–15)
GLUCOSE SERPL-MCNC: 95 MG/DL (ref 70–99)
HCO3 SERPL-SCNC: 28 MMOL/L (ref 22–29)
HCT VFR BLD AUTO: 32.3 % (ref 35–47)
HGB BLD-MCNC: 10.3 G/DL (ref 11.7–15.7)
IMM GRANULOCYTES # BLD: 0.4 10E3/UL
IMM GRANULOCYTES NFR BLD: 4 %
LYMPHOCYTES # BLD AUTO: 2 10E3/UL (ref 0.8–5.3)
LYMPHOCYTES NFR BLD AUTO: 20 %
MCH RBC QN AUTO: 28.9 PG (ref 26.5–33)
MCHC RBC AUTO-ENTMCNC: 31.9 G/DL (ref 31.5–36.5)
MCV RBC AUTO: 91 FL (ref 78–100)
MONOCYTES # BLD AUTO: 0.8 10E3/UL (ref 0–1.3)
MONOCYTES NFR BLD AUTO: 8 %
NEUTROPHILS # BLD AUTO: 6.7 10E3/UL (ref 1.6–8.3)
NEUTROPHILS NFR BLD AUTO: 68 %
NRBC # BLD AUTO: 0 10E3/UL
NRBC BLD AUTO-RTO: 0 /100
PLATELET # BLD AUTO: 206 10E3/UL (ref 150–450)
POTASSIUM SERPL-SCNC: 3.8 MMOL/L (ref 3.4–5.3)
RBC # BLD AUTO: 3.57 10E6/UL (ref 3.8–5.2)
SODIUM SERPL-SCNC: 138 MMOL/L (ref 135–145)
WBC # BLD AUTO: 9.9 10E3/UL (ref 4–11)

## 2024-08-18 PROCEDURE — 94640 AIRWAY INHALATION TREATMENT: CPT | Mod: 76

## 2024-08-18 PROCEDURE — 250N000009 HC RX 250: Performed by: INTERNAL MEDICINE

## 2024-08-18 PROCEDURE — 120N000001 HC R&B MED SURG/OB

## 2024-08-18 PROCEDURE — 250N000013 HC RX MED GY IP 250 OP 250 PS 637: Performed by: STUDENT IN AN ORGANIZED HEALTH CARE EDUCATION/TRAINING PROGRAM

## 2024-08-18 PROCEDURE — 94640 AIRWAY INHALATION TREATMENT: CPT

## 2024-08-18 PROCEDURE — 250N000012 HC RX MED GY IP 250 OP 636 PS 637: Performed by: HOSPITALIST

## 2024-08-18 PROCEDURE — 85025 COMPLETE CBC W/AUTO DIFF WBC: CPT | Performed by: HOSPITALIST

## 2024-08-18 PROCEDURE — 999N000157 HC STATISTIC RCP TIME EA 10 MIN

## 2024-08-18 PROCEDURE — 250N000013 HC RX MED GY IP 250 OP 250 PS 637: Performed by: INTERNAL MEDICINE

## 2024-08-18 PROCEDURE — 250N000011 HC RX IP 250 OP 636: Performed by: PHYSICIAN ASSISTANT

## 2024-08-18 PROCEDURE — 36415 COLL VENOUS BLD VENIPUNCTURE: CPT | Performed by: HOSPITALIST

## 2024-08-18 PROCEDURE — 80048 BASIC METABOLIC PNL TOTAL CA: CPT | Performed by: HOSPITALIST

## 2024-08-18 RX ADMIN — METOPROLOL TARTRATE 37.5 MG: 25 TABLET, FILM COATED ORAL at 20:20

## 2024-08-18 RX ADMIN — PREDNISONE 40 MG: 20 TABLET ORAL at 09:12

## 2024-08-18 RX ADMIN — SODIUM CHLORIDE SOLN NEBU 3% 4 ML: 3 NEBU SOLN at 07:52

## 2024-08-18 RX ADMIN — LEVOTHYROXINE SODIUM 112 MCG: 0.11 TABLET ORAL at 09:13

## 2024-08-18 RX ADMIN — ENOXAPARIN SODIUM 40 MG: 40 INJECTION SUBCUTANEOUS at 14:01

## 2024-08-18 RX ADMIN — FLUTICASONE FUROATE AND VILANTEROL 1 PUFF: 200; 25 POWDER RESPIRATORY (INHALATION) at 07:52

## 2024-08-18 RX ADMIN — PAROXETINE HYDROCHLORIDE 20 MG: 20 TABLET, FILM COATED ORAL at 09:13

## 2024-08-18 RX ADMIN — METOPROLOL TARTRATE 37.5 MG: 25 TABLET, FILM COATED ORAL at 09:12

## 2024-08-18 RX ADMIN — METHOCARBAMOL 1000 MG: 500 TABLET ORAL at 14:01

## 2024-08-18 RX ADMIN — LEVALBUTEROL HYDROCHLORIDE 0.63 MG: 0.63 SOLUTION RESPIRATORY (INHALATION) at 20:27

## 2024-08-18 RX ADMIN — DILTIAZEM HYDROCHLORIDE 240 MG: 240 CAPSULE, COATED, EXTENDED RELEASE ORAL at 09:12

## 2024-08-18 RX ADMIN — ATORVASTATIN CALCIUM 20 MG: 20 TABLET, FILM COATED ORAL at 09:13

## 2024-08-18 RX ADMIN — LEVALBUTEROL HYDROCHLORIDE 0.63 MG: 0.63 SOLUTION RESPIRATORY (INHALATION) at 11:33

## 2024-08-18 RX ADMIN — SODIUM CHLORIDE SOLN NEBU 3% 4 ML: 3 NEBU SOLN at 20:26

## 2024-08-18 RX ADMIN — UMECLIDINIUM 1 PUFF: 62.5 AEROSOL, POWDER ORAL at 07:52

## 2024-08-18 RX ADMIN — METHOCARBAMOL 1000 MG: 500 TABLET ORAL at 20:18

## 2024-08-18 RX ADMIN — METHOCARBAMOL 1000 MG: 500 TABLET ORAL at 09:13

## 2024-08-18 RX ADMIN — LEVALBUTEROL HYDROCHLORIDE 0.63 MG: 0.63 SOLUTION RESPIRATORY (INHALATION) at 07:51

## 2024-08-18 RX ADMIN — ACETAMINOPHEN 650 MG: 325 TABLET, FILM COATED ORAL at 22:01

## 2024-08-18 RX ADMIN — ACETAMINOPHEN 650 MG: 325 TABLET, FILM COATED ORAL at 16:02

## 2024-08-18 RX ADMIN — LEVALBUTEROL HYDROCHLORIDE 0.63 MG: 0.63 SOLUTION RESPIRATORY (INHALATION) at 15:23

## 2024-08-18 RX ADMIN — ACETAMINOPHEN 650 MG: 325 TABLET, FILM COATED ORAL at 04:21

## 2024-08-18 RX ADMIN — ACETAMINOPHEN 650 MG: 325 TABLET, FILM COATED ORAL at 09:13

## 2024-08-18 ASSESSMENT — ACTIVITIES OF DAILY LIVING (ADL)
ADLS_ACUITY_SCORE: 28

## 2024-08-18 NOTE — PROGRESS NOTES
"COLON & RECTAL SURGERY  PROGRESS NOTE    August 18, 2024  Post-op Day # 3    SUBJECTIVE:  Tolerated full liquids without nausea or vomiting. Having colostomy output. Abdominal pain well controlled.    OBJECTIVE:  Temp:  [96  F (35.6  C)-98.3  F (36.8  C)] 98.3  F (36.8  C)  Pulse:  [51-68] 52  Resp:  [14-20] 18  BP: (118-155)/(63-80) 118/63  SpO2:  [90 %-97 %] 96 %        Intake/Output Summary (Last 24 hours) at 8/18/2024 1119  Last data filed at 8/17/2024 1300  Gross per 24 hour   Intake 480 ml   Output --   Net 480 ml       GENERAL:  Awake, alert, no acute distress, lying in bed  HEAD: Nomocephalic atraumatic  SCLERA: anicteric  EXTREMITIES: warm and well perfused  ABDOMEN:  Soft, appropriately tender, mildly-distended, no rebound or guarding, no peritoneal signs. Colostomy pink with stool in appliance. Mucous fistula pink and viable.   INCISION:  Clean and dry with penrose in prior stoma site.    LABS:  Lab Results   Component Value Date    WBC 9.9 08/18/2024    WBC 6.2 02/02/2006     Lab Results   Component Value Date    RBC 3.57 08/18/2024    RBC 2.86 02/02/2006     Lab Results   Component Value Date    HGB 10.3 08/18/2024    HGB 10.8 02/02/2006     Lab Results   Component Value Date    HCT 32.3 08/18/2024    HCT 30.4 02/02/2006     No components found for: \"MCT\"  Lab Results   Component Value Date    MCV 91 08/18/2024     02/02/2006     Lab Results   Component Value Date    MCH 28.9 08/18/2024    MCH 37.7 02/02/2006     Lab Results   Component Value Date    MCHC 31.9 08/18/2024    MCHC 35.4 02/02/2006     Lab Results   Component Value Date    RDW 13.2 08/18/2024    RDW 13.5 02/02/2006     Lab Results   Component Value Date     08/18/2024     02/02/2006     Last Comprehensive Metabolic Panel:  Lab Results   Component Value Date     08/18/2024    POTASSIUM 3.8 08/18/2024    CHLORIDE 101 08/18/2024    CO2 28 08/18/2024    ANIONGAP 9 08/18/2024    GLC 95 08/18/2024    BUN 10.4 08/18/2024 "    CR 0.75 08/18/2024    GFRESTIMATED >90 08/18/2024    EDIN 8.3 (L) 08/18/2024       ASSESSMENT/PLAN: Lara Melendez is a 58-year-old woman with severe COPD, pAfib, and hx of transverse loop colostomy for LBO, admitted with COVID-19 and acute COPD exacerbation, now POD#2 s/p stoma revision, parastomal hernia repair and small bowel resection for stoma prolapse, recovering appropriately.    - Advance to full liquid diet this morning, may advance to low fiber diet this afternoon if tolerating  - Pain management, minimize narcotics  - Encourage ambulation 4x daily  - Daily dressing changes  - WOCN for stoma cares and teaching  - Lovenox for DVT ppx    For questions/paging, please contact the CRS office at [phone].    Sergio Greene MD  Fellow, Colon & Rectal Surgery  [facility]  08/18/2024  11:19 AM    Colorectal Surgery can be reached at [phone] at all times. Between 5pm and 7am you will be connected to the on-call physician

## 2024-08-18 NOTE — PLAN OF CARE
"AO4. RA. Denies SOB. Tylenol for pain. Covid precautions in place. Ostomy WDL, no output. SBA. Uses bedside commode.     Goal Outcome Evaluation:      Plan of Care Reviewed With: patient    Overall Patient Progress: improvingOverall Patient Progress: improving    Outcome Evaluation: Denies SOB. intermittent cough. Special precaution for COVID. Continuous pulse ox. Ostomy WDL. No output.      Problem: Adult Inpatient Plan of Care  Goal: Plan of Care Review  Description: The Plan of Care Review/Shift note should be completed every shift.  The Outcome Evaluation is a brief statement about your assessment that the patient is improving, declining, or no change.  This information will be displayed automatically on your shift  note.  Outcome: Progressing  Flowsheets (Taken 8/18/2024 0708)  Outcome Evaluation: Denies SOB. intermittent cough. Special precaution for COVID. Continuous pulse ox. Ostomy WDL. No output.  Plan of Care Reviewed With: patient  Overall Patient Progress: improving  Goal: Patient-Specific Goal (Individualized)  Description: You can add care plan individualizations to a care plan. Examples of Individualization might be:  \"Parent requests to be called daily at 9am for status\", \"I have a hard time hearing out of my right ear\", or \"Do not touch me to wake me up as it startles  me\".  Outcome: Progressing  Goal: Absence of Hospital-Acquired Illness or Injury  Outcome: Progressing  Intervention: Identify and Manage Fall Risk  Recent Flowsheet Documentation  Taken 8/18/2024 0215 by Mary Roe, RN  Safety Promotion/Fall Prevention:   activity supervised   assistive device/personal items within reach   clutter free environment maintained   nonskid shoes/slippers when out of bed   safety round/check completed   room organization consistent  Intervention: Prevent and Manage VTE (Venous Thromboembolism) Risk  Recent Flowsheet Documentation  Taken 8/18/2024 0215 by Mary Roe, RN  VTE Prevention/Management: " SCDs on (sequential compression devices)  Intervention: Prevent Infection  Recent Flowsheet Documentation  Taken 8/18/2024 0215 by Mary Roe RN  Infection Prevention:   hand hygiene promoted   rest/sleep promoted   single patient room provided  Goal: Optimal Comfort and Wellbeing  Outcome: Progressing  Intervention: Provide Person-Centered Care  Recent Flowsheet Documentation  Taken 8/18/2024 0215 by Mary Roe RN  Trust Relationship/Rapport: care explained  Goal: Readiness for Transition of Care  Outcome: Progressing     Problem: Skin Injury Risk Increased  Goal: Skin Health and Integrity  Outcome: Progressing     Problem: Infection  Goal: Absence of Infection Signs and Symptoms  Outcome: Progressing  Intervention: Prevent or Manage Infection  Recent Flowsheet Documentation  Taken 8/18/2024 0215 by Mary Roe RN  Isolation Precautions: special precautions maintained     Problem: COPD (Chronic Obstructive Pulmonary Disease)  Goal: Optimal Chronic Illness Coping  Outcome: Progressing  Intervention: Support and Optimize Psychosocial Response  Recent Flowsheet Documentation  Taken 8/18/2024 0215 by Mary Roe RN  Supportive Measures:   active listening utilized   self-care encouraged  Goal: Optimal Level of Functional [place]  Outcome: Progressing  Goal: Absence of Infection Signs and Symptoms  Outcome: Progressing  Intervention: Prevent or Manage Infection  Recent Flowsheet Documentation  Taken 8/18/2024 0215 by Mary Roe RN  Isolation Precautions: special precautions maintained  Goal: Improved Oral Intake  Outcome: Progressing  Goal: Effective Oxygenation and Ventilation  Outcome: Progressing  Intervention: Promote Airway Secretion Clearance  Recent Flowsheet Documentation  Taken 8/18/2024 0215 by Mary Roe RN  Cough And Deep Breathing: done independently per patient     Problem: Comorbidity Management  Goal: Maintenance of COPD Symptom Control  Outcome: Progressing  Intervention:  Maintain COPD Symptom Control  Recent Flowsheet Documentation  Taken 8/18/2024 0215 by Mary Roe RN  Supportive Measures:   active listening utilized   self-care encouraged  Medication Review/Management: medications reviewed  Goal: Blood Pressure in Desired Range  Outcome: Progressing  Intervention: Maintain Blood Pressure Management  Recent Flowsheet Documentation  Taken 8/18/2024 0215 by Mary Roe RN  Medication Review/Management: medications reviewed     Problem: Hernia Repair  Goal: Absence of Bleeding  Outcome: Progressing  Intervention: Monitor and Manage Bleeding  Recent Flowsheet Documentation  Taken 8/18/2024 0215 by Mary Roe RN  Bleeding Management: dressing monitored  Goal: Effective Bowel Elimination  Outcome: Progressing  Goal: Fluid and Electrolyte Balance  Outcome: Progressing  Goal: Absence of Infection Signs and Symptoms  Outcome: Progressing  Intervention: Prevent or Manage Infection  Recent Flowsheet Documentation  Taken 8/18/2024 0215 by Mary Roe RN  Isolation Precautions: special precautions maintained  Goal: Anesthesia/Sedation Recovery  Outcome: Progressing  Intervention: Optimize Anesthesia Recovery  Recent Flowsheet Documentation  Taken 8/18/2024 0215 by Mary Roe RN  Safety Promotion/Fall Prevention:   activity supervised   assistive device/personal items within reach   clutter free environment maintained   nonskid shoes/slippers when out of bed   safety round/check completed   room organization consistent  Administration (IS): proper technique demonstrated  Patient Tolerance (IS): fair  Goal: Optimal Pain Control and Function  Outcome: Progressing  Goal: Nausea and Vomiting Relief  Outcome: Progressing  Goal: Effective Urinary Elimination  Outcome: Progressing  Goal: Effective Oxygenation and Ventilation  Outcome: Progressing

## 2024-08-18 NOTE — PLAN OF CARE
"Goal Outcome Evaluation:      Plan of Care Reviewed With: patient          Outcome Evaluation: VSS. Maintaining adequate O2 saturation on room air. Pain managed with scheduled medications - no need for PRN options. Denying nausea. Tolerating PO intake. Ostomy with stool output. Abdominal dressing changed - previous dressing with light serosanguinous saturation. Up with SBA. Diet adv to low fiber. Remains without PIV access. Pt hoping to discharge home tomorrow.      Problem: Adult Inpatient Plan of Care  Goal: Plan of Care Review  Description: The Plan of Care Review/Shift note should be completed every shift.  The Outcome Evaluation is a brief statement about your assessment that the patient is improving, declining, or no change.  This information will be displayed automatically on your shift  note.  Outcome: Progressing  Flowsheets (Taken 8/18/2024 1429)  Outcome Evaluation: VSS. Maintaining adequate O2 saturation on room air. Pain managed with scheduled medications - no need for PRN options. Denying nausea. Tolerating PO intake. Ostomy with stool output. Abdominal dressing changed - previous dressing with light serosanguinous saturation. Up with SBA. Diet adv to low fiber. Pt hoping to discharge home tomorrow.  Plan of Care Reviewed With: patient  Goal: Patient-Specific Goal (Individualized)  Description: You can add care plan individualizations to a care plan. Examples of Individualization might be:  \"Parent requests to be called daily at 9am for status\", \"I have a hard time hearing out of my right ear\", or \"Do not touch me to wake me up as it startles  me\".  Outcome: Progressing  Goal: Absence of Hospital-Acquired Illness or Injury  Outcome: Progressing  Intervention: Identify and Manage Fall Risk  Recent Flowsheet Documentation  Taken 8/18/2024 0900 by Sandro Pantoja RN  Safety Promotion/Fall Prevention:   activity supervised   assistive device/personal items within reach   increase visualization of " patient   nonskid shoes/slippers when out of bed   patient and family education   safety round/check completed  Intervention: Prevent Skin Injury  Recent Flowsheet Documentation  Taken 8/18/2024 0900 by Sandro Pantoja RN  Device Skin Pressure Protection: tubing/devices free from skin contact  Goal: Optimal Comfort and Wellbeing  Outcome: Progressing  Intervention: Provide Person-Centered Care  Recent Flowsheet Documentation  Taken 8/18/2024 0900 by Sandro Pantoja RN  Trust Relationship/Rapport: care explained  Goal: Readiness for Transition of Care  Outcome: Progressing

## 2024-08-18 NOTE — PROGRESS NOTES
[facility]    Medicine Progress Note - Hospitalist Service    Date of Admission:  8/8/2024    Assessment & Plan   Lara Melendez is a 58 year old female with PMH of severe COPD on 2-3L O2 requiring previous tracheostomy, paroxysmal Afib on eliquis, HTN, HLD, hypothyroidism, anemia, MDD, anxiety, and large bowel obstruction requiring ostomy placement, who was admitted on 8/8/2024 with acute on chronic hypoxemic and hypercapnic respiratory failure 2/2 COPD exacerbation and COVID infection.       COVID-19 infection, on room air and saturating ok   Acute on chronic hypoxemic and hypercapnic respiratory failure, resolved   Severe COPD with acute exacerbation  She has severe COPD at baseline and follows closely with pulmonology.  She is on numerous inhalers at baseline.  She has been receiving prednisone to try to optimize her lung function prior to having an ostomy takedown and she was taking 10 mg daily although took 30 mg this morning.  For the last 24 to 48 hours she has had headache, fatigue, and progressively worsening shortness of breath with wheezing despite using nebulizer treatments.  In the ER she was in respiratory distress and received further nebs, magnesium, and 10 mg IV dexamethasone after testing positive for COVID-19 PCR.  Chest x-ray does not show any infiltrate.  She was placed on BiPAP for her COPD exacerbation and is feeling better.  She does not have a leukocytosis, fever, or elevated procalcitonin/lactic acid.  She has had COVID-19 before and is vaccinated.  She did require intubation in January for COPD and also required a tracheostomy a little over a year ago that has since been decannulated.  She does use 3 L oxygen at home and chronically has a mildly elevated CO2 on previous blood gases as she does now with pH 7.33, pCO2 51, pO2 66, bicarb 27.  Significantly improved with BiPAP. Last ABG 7.36/47/110/26. Tolerated HFNC 8/10 but put back on BiPAP for sleep per patient  "preference.   -IV dexamethasone 6 mg every 24 hours, completed 5 days  -IV Remdesivir day 4/5  -continue 3% saline nebs twice daily, albuterol nebs as needed  -HFNC while awake, wean as able  -BiPAP when sleeping, consider BiPAP on discharge  -Acetaminophen as needed for headache and fevers  -Continue PTA azithromycin 250 mg every MWF  change from DuoNebs 4 times daily to Xopenex and add Spiriva  Prednisone taper at 40 mg daily       MDD  Anxiety  Having significant anxiety 2/2 dyspnea. PTA on PRN clonazepam, which she usually takes ~1x/day.   - Resume PTA paroxetine   - PRN ativan 0.5mg q4h PRN      Paroxysmal A-fib  HTN  EKG shows NSR.    -- Continue Eliquis, metoprolol, and diltiazem  Prn hydralazine for elevated BP     HLD:   -- continue PTA atorvastatin.     Hypothyroidism:   -- continue PTA levothyroxine.     Chronic anemia:   -- Hemoglobin at baseline of 10-11.     Colostomy: Previous large bowel obstruction requiring diverting colostomy.  As above there were plans to have a takedown surgery once lung status was optimized, however this was canceled.  -WOC RN consulted for colostomy and recommended CRS involvement.   -Colorectal took her to operating room 8/15, closed colostomy on the left lower quadrant, moved the colostomy to the right lower quadrant and performed herniorrhaphy.        Diet: Clear Liquid Diet  Snacks/Supplements Adult: Gelatein Plus; Between Meals    DVT Prophylaxis: Lovenox   Hines Catheter: Not present  Lines: None     Cardiac Monitoring: None  Code Status: Full Code      Clinically Significant Risk Factors                  # Hypertension: Noted on problem list           # Overweight: Estimated body mass index is 25.91 kg/m  as calculated from the following:    Height as of this encounter: 1.613 m (5' 3.5\").    Weight as of this encounter: 67.4 kg (148 lb 9.4 oz).      # COPD: noted on problem list       Disposition Plan     Medically Ready for Discharge: Anticipated in 2-4 " Days        Darrel Leiva MD  Hospitalist Service  [facility]  Securely message with [employer] (more info)  Text page via [employer] Paging/Directory   ______________________________________________________________________    Interval History   Pt seen and evaluated  On room air, denies dyspnea, no productive cough  She is better respiratory wise, alternating room air with low-flow nasal cannula.   Not symptomatic at the moment.abdomen tenderness, no pain.  Tolerating diet.  Waiting for CRS increases level of activity    Physical Exam   Vital Signs: Temp: 98.3  F (36.8  C) Temp src: Temporal BP: 118/63 Pulse: 52   Resp: 18 SpO2: 96 % O2 Device: None (Room air)    Weight: 148 lbs 9.44 oz    GEN:  Alert, oriented x 3, appears comfortable, NAD.  HEENT:  Normocephalic/atraumatic, no scleral icterus, no nasal discharge, mouth moist.  CV:  Regular rate and rhythm, no murmur or JVD.  S1 + S2 noted, no S3 or S4.  LUNGS: Coarse sounds, diminished to auscultation bilaterally with rhonchi/wheezing/crackles.  Symmetric chest rise on inhalation noted.  ABD:  Active bowel sounds, soft, non-tender/non-distended.  No rebound/guarding/rigidity.  EXT:  No edema or cyanosis.  No joint synovitis noted.  SKIN:  Dry to touch, no exanthems noted in the visualized areas.         Medical Decision Making       42 MINUTES SPENT BY ME on the date of service doing chart review, history, exam, documentation & further activities per the note.      Data   CBC RESULTS:   Recent Labs   Lab Test 08/18/24  0627   WBC 9.9   RBC 3.57*   HGB 10.3*   HCT 32.3*   MCV 91   MCH 28.9   MCHC 31.9   RDW 13.2        Last Comprehensive Metabolic Panel:  Lab Results   Component Value Date     08/18/2024    POTASSIUM 3.8 08/18/2024    CHLORIDE 101 08/18/2024    CO2 28 08/18/2024    ANIONGAP 9 08/18/2024    GLC 95 08/18/2024    BUN 10.4 08/18/2024    CR 0.75 08/18/2024    GFRESTIMATED >90 08/18/2024    EDIN 8.3 (L) 08/18/2024       Liver  Function Studies -   Recent Labs   Lab Test 08/10/24  0534   PROTTOTAL 7.2   ALBUMIN 3.9   BILITOTAL 0.2   ALKPHOS 87   AST 17   ALT 32       Imaging results reviewed over the past 24 hrs:   No results found for this or any previous visit (from the past 24 hour(s)).

## 2024-08-18 NOTE — PLAN OF CARE
"Goal Outcome Evaluation:         Plan of Care Reviewed With: patient    Overall Patient Progress: improvingOverall Patient Progress: improving    Outcome Evaluation: pt is A&ox4, on special precotion for COVID.continues clear liquid diet. on RA. deneis SOB at rest. LS diminished.  pt c/o SOB with activity. productive  cough present. continues scheduled nebs. no N/V.stoma bag intact. empted X2 only gas. no liquid output. dressing has scant serous drainage, no need to  change. obed saw pt today.pt c/o , that she is hungy.abdomenal bainder in place. old stoma site has scab. abdomen  slightly distanded. pt has pain when coughs. no pain at rest. no T/N. voiding, SBA.    Problem: Adult Inpatient Plan of Care  Goal: Plan of Care Review  Description: The Plan of Care Review/Shift note should be completed every shift.  The Outcome Evaluation is a brief statement about your assessment that the patient is improving, declining, or no change.  This information will be displayed automatically on your shift  note.  Outcome: Progressing  Flowsheets (Taken 8/17/2024 2056)  Outcome Evaluation: pt is A&ox4, on special precotion for COVID.continues clear liquid diet. on RA. deneis SOB at rest. LS diminished.  pt c/o SOB with activity. productive  cough present. continues scheduled nebs. no N/V.stoma bag inatc. empted X2 only gas. no liquid output. dressing has scan seous drainge no need to  change. obed saw pt today.pt c/o she is hungy.abdomenal bainder in place. old stoma site has scab. abdomen  slightly distanded. pt has pain when coughs. no pain at rest. no T/N. voiding, SBA.  Plan of Care Reviewed With: patient  Overall Patient Progress: improving  Goal: Patient-Specific Goal (Individualized)  Description: You can add care plan individualizations to a care plan. Examples of Individualization might be:  \"Parent requests to be called daily at 9am for status\", \"I have a hard time hearing out of my right ear\", or \"Do not touch me " "to wake me up as it startles  me\".  Outcome: Progressing  Goal: Absence of Hospital-Acquired Illness or Injury  Outcome: Progressing  Intervention: Identify and Manage Fall Risk  Recent Flowsheet Documentation  Taken 8/17/2024 1600 by Nisa Kerr RN  Safety Promotion/Fall Prevention:   activity supervised   assistive device/personal items within reach   clutter free environment maintained   nonskid shoes/slippers when out of bed   safety round/check completed   room organization consistent  Intervention: Prevent and Manage VTE (Venous Thromboembolism) Risk  Recent Flowsheet Documentation  Taken 8/17/2024 1600 by Nisa Kerr RN  VTE Prevention/Management: SCDs on (sequential compression devices)  Intervention: Prevent Infection  Recent Flowsheet Documentation  Taken 8/17/2024 1600 by Nisa Kerr RN  Infection Prevention:   hand hygiene promoted   rest/sleep promoted   single patient room provided  Goal: Optimal Comfort and Wellbeing  Outcome: Progressing  Intervention: Monitor Pain and Promote Comfort  Recent Flowsheet Documentation  Taken 8/17/2024 1710 by Nisa Kerr RN  Pain Management Interventions:   medication (see MAR)   repositioned  Taken 8/17/2024 1609 by Nisa Kerr RN  Pain Management Interventions:   medication (see MAR)   repositioned  Intervention: Provide Person-Centered Care  Recent Flowsheet Documentation  Taken 8/17/2024 1600 by Nisa Kerr RN  Trust Relationship/Rapport: care explained  Goal: Readiness for Transition of Care  Outcome: Progressing     Problem: Skin Injury Risk Increased  Goal: Skin Health and Integrity  Outcome: Progressing  Intervention: Plan: Nurse Driven Intervention: Moisture Management  Recent Flowsheet Documentation  Taken 8/17/2024 1947 by Nisa Kerr RN  Moisture Interventions: No brief in bed  Bathing/Skin Care:   back care   foot care     Problem: Infection  Goal: Absence of Infection Signs and Symptoms  Outcome: Progressing  Intervention: " Prevent or Manage Infection  Recent Flowsheet Documentation  Taken 8/17/2024 1600 by Nisa Kerr RN  Isolation Precautions: special precautions maintained     Problem: COPD (Chronic Obstructive Pulmonary Disease)  Goal: Optimal Chronic Illness Coping  Outcome: Progressing  Intervention: Support and Optimize Psychosocial Response  Recent Flowsheet Documentation  Taken 8/17/2024 1600 by Nisa Kerr RN  Supportive Measures:   active listening utilized   self-care encouraged  Goal: Optimal Level of Functional [place]  Outcome: Progressing  Goal: Absence of Infection Signs and Symptoms  Outcome: Progressing  Intervention: Prevent or Manage Infection  Recent Flowsheet Documentation  Taken 8/17/2024 1600 by Nisa Kerr RN  Isolation Precautions: special precautions maintained  Goal: Improved Oral Intake  Outcome: Progressing  Goal: Effective Oxygenation and Ventilation  Outcome: Progressing  Intervention: Promote Airway Secretion Clearance  Recent Flowsheet Documentation  Taken 8/17/2024 1600 by Nisa Kerr RN  Cough And Deep Breathing: done independently per patient     Problem: Comorbidity Management  Goal: Maintenance of COPD Symptom Control  Outcome: Progressing  Intervention: Maintain COPD Symptom Control  Recent Flowsheet Documentation  Taken 8/17/2024 1600 by Nisa Kerr RN  Supportive Measures:   active listening utilized   self-care encouraged  Medication Review/Management: medications reviewed  Goal: Blood Pressure in Desired Range  Outcome: Progressing  Intervention: Maintain Blood Pressure Management  Recent Flowsheet Documentation  Taken 8/17/2024 1600 by Nisa Kerr RN  Medication Review/Management: medications reviewed     Problem: Hernia Repair  Goal: Absence of Bleeding  Outcome: Progressing  Intervention: Monitor and Manage Bleeding  Recent Flowsheet Documentation  Taken 8/17/2024 1600 by Nisa Kerr RN  Bleeding Management: dressing monitored  Goal: Effective Bowel  Elimination  Outcome: Progressing  Goal: Fluid and Electrolyte Balance  Outcome: Progressing  Goal: Absence of Infection Signs and Symptoms  Outcome: Progressing  Intervention: Prevent or Manage Infection  Recent Flowsheet Documentation  Taken 8/17/2024 1600 by Nisa Kerr RN  Isolation Precautions: special precautions maintained  Goal: Anesthesia/Sedation Recovery  Outcome: Progressing  Intervention: Optimize Anesthesia Recovery  Recent Flowsheet Documentation  Taken 8/17/2024 1600 by Nisa Kerr RN  Safety Promotion/Fall Prevention:   activity supervised   assistive device/personal items within reach   clutter free environment maintained   nonskid shoes/slippers when out of bed   safety round/check completed   room organization consistent  Administration (IS): proper technique demonstrated  Level Incentive Spirometer (mL): 800  Number of Repetitions (IS): 3  Patient Tolerance (IS): fair  Goal: Optimal Pain Control and Function  Outcome: Progressing  Intervention: Prevent or Manage Pain  Recent Flowsheet Documentation  Taken 8/17/2024 1710 by Nisa Kerr RN  Pain Management Interventions:   medication (see MAR)   repositioned  Taken 8/17/2024 1609 by Nisa Kerr RN  Pain Management Interventions:   medication (see MAR)   repositioned  Goal: Nausea and Vomiting Relief  Outcome: Progressing  Goal: Effective Urinary Elimination  Outcome: Progressing  Goal: Effective Oxygenation and Ventilation  Outcome: Progressing

## 2024-08-19 VITALS
TEMPERATURE: 97 F | HEART RATE: 90 BPM | RESPIRATION RATE: 16 BRPM | HEIGHT: 64 IN | OXYGEN SATURATION: 94 % | SYSTOLIC BLOOD PRESSURE: 146 MMHG | WEIGHT: 148.59 LBS | DIASTOLIC BLOOD PRESSURE: 64 MMHG | BODY MASS INDEX: 25.37 KG/M2

## 2024-08-19 PROCEDURE — 94640 AIRWAY INHALATION TREATMENT: CPT

## 2024-08-19 PROCEDURE — 250N000009 HC RX 250: Performed by: INTERNAL MEDICINE

## 2024-08-19 PROCEDURE — 99239 HOSP IP/OBS DSCHRG MGMT >30: CPT | Performed by: INTERNAL MEDICINE

## 2024-08-19 PROCEDURE — 250N000013 HC RX MED GY IP 250 OP 250 PS 637: Performed by: STUDENT IN AN ORGANIZED HEALTH CARE EDUCATION/TRAINING PROGRAM

## 2024-08-19 PROCEDURE — 250N000013 HC RX MED GY IP 250 OP 250 PS 637: Performed by: INTERNAL MEDICINE

## 2024-08-19 PROCEDURE — G0463 HOSPITAL OUTPT CLINIC VISIT: HCPCS

## 2024-08-19 PROCEDURE — 999N000157 HC STATISTIC RCP TIME EA 10 MIN

## 2024-08-19 PROCEDURE — 250N000012 HC RX MED GY IP 250 OP 636 PS 637: Performed by: HOSPITALIST

## 2024-08-19 RX ORDER — PREDNISONE 10 MG/1
TABLET ORAL
Qty: 20 TABLET | Refills: 0 | Status: SHIPPED | OUTPATIENT
Start: 2024-08-19

## 2024-08-19 RX ORDER — ACETAMINOPHEN 325 MG/1
650 TABLET ORAL EVERY 6 HOURS PRN
COMMUNITY
Start: 2024-08-19

## 2024-08-19 RX ADMIN — AZITHROMYCIN DIHYDRATE 500 MG: 250 TABLET ORAL at 08:53

## 2024-08-19 RX ADMIN — UMECLIDINIUM 1 PUFF: 62.5 AEROSOL, POWDER ORAL at 08:05

## 2024-08-19 RX ADMIN — FLUTICASONE FUROATE AND VILANTEROL 1 PUFF: 200; 25 POWDER RESPIRATORY (INHALATION) at 08:05

## 2024-08-19 RX ADMIN — ATORVASTATIN CALCIUM 20 MG: 20 TABLET, FILM COATED ORAL at 08:53

## 2024-08-19 RX ADMIN — LEVALBUTEROL HYDROCHLORIDE 0.63 MG: 0.63 SOLUTION RESPIRATORY (INHALATION) at 08:05

## 2024-08-19 RX ADMIN — PAROXETINE HYDROCHLORIDE 20 MG: 20 TABLET, FILM COATED ORAL at 08:53

## 2024-08-19 RX ADMIN — DILTIAZEM HYDROCHLORIDE 240 MG: 240 CAPSULE, COATED, EXTENDED RELEASE ORAL at 08:52

## 2024-08-19 RX ADMIN — METOPROLOL TARTRATE 37.5 MG: 25 TABLET, FILM COATED ORAL at 08:52

## 2024-08-19 RX ADMIN — LEVOTHYROXINE SODIUM 112 MCG: 0.11 TABLET ORAL at 08:53

## 2024-08-19 RX ADMIN — ACETAMINOPHEN 650 MG: 325 TABLET, FILM COATED ORAL at 03:10

## 2024-08-19 RX ADMIN — SODIUM CHLORIDE SOLN NEBU 3% 4 ML: 3 NEBU SOLN at 08:05

## 2024-08-19 RX ADMIN — PREDNISONE 40 MG: 20 TABLET ORAL at 08:52

## 2024-08-19 RX ADMIN — METHOCARBAMOL 1000 MG: 500 TABLET ORAL at 08:53

## 2024-08-19 ASSESSMENT — ACTIVITIES OF DAILY LIVING (ADL)
ADLS_ACUITY_SCORE: 28

## 2024-08-19 NOTE — PROGRESS NOTES
Care Management Discharge Note    Discharge Date: 08/19/2024       Discharge Disposition: Home, DME    Discharge Transportation: family or friend will provide    Private pay costs discussed: Not applicable    Does the patient's insurance plan have a 3 day qualifying hospital stay waiver?  No    Handoff Referral Completed: No    Additional Information:  Pt will be discharging home today. No services ordered, pt is independent with ostomy care.  Please call if discharge needs arise.    Marla Atnony RN   Inpatient Care Coordination  [facility]   Phone: [phone]

## 2024-08-19 NOTE — PLAN OF CARE
"Goal Outcome Evaluation:      Plan of Care Reviewed With: patient    Overall Patient Progress: improvingOverall Patient Progress: improving    Outcome Evaluation: pt is A&OX4, on RA. no chills, no fever.no dizziness.cont. on scheduled nebs. pt deneis SOB, still having  infrequent cough. no N/V. tolerating low faiber diet. colostomy bag has good output.pt is able to take care of colostomy bag. empted X3 today, for this shift.abdominal dressing has small serous, old dry drainge, no need to change dressing.voiding adequately. reporting minimal pain. takes scheduled  Robaxine and Tylenol.pt is SBA.Possible discharge on 8/19/24 home.      Problem: Adult Inpatient Plan of Care  Goal: Plan of Care Review  Description: The Plan of Care Review/Shift note should be completed every shift.  The Outcome Evaluation is a brief statement about your assessment that the patient is improving, declining, or no change.  This information will be displayed automatically on your shift  note.  Outcome: Progressing  Flowsheets (Taken 8/18/2024 2230)  Outcome Evaluation: pt is A&OX4, on RA. no chills, no fever.no dizziness.cont. on scheduled nebs. pt deneis SOB, still having  infrequent cough. no N/V. tolerating low faiber diet. colostomy bag has good output.pt is able to take care of colostomy bag. empted X3 today, for this shift.abdominal dressing has small serous, old dry drainge, no need to change dressing.voiding adequately. reporting minimal pain. takes scheduled  Robaxine and Tylenol.pt is SBA.Possible discharge on 8/19/24 home.  Plan of Care Reviewed With: patient  Overall Patient Progress: improving  Goal: Patient-Specific Goal (Individualized)  Description: You can add care plan individualizations to a care plan. Examples of Individualization might be:  \"Parent requests to be called daily at 9am for status\", \"I have a hard time hearing out of my right ear\", or \"Do not touch me to wake me up as it startles  me\".  Outcome: " Progressing  Goal: Absence of Hospital-Acquired Illness or Injury  Outcome: Progressing  Intervention: Identify and Manage Fall Risk  Recent Flowsheet Documentation  Taken 8/18/2024 1600 by Nisa Kerr RN  Safety Promotion/Fall Prevention:   activity supervised   assistive device/personal items within reach   increase visualization of patient   nonskid shoes/slippers when out of bed   patient and family education   safety round/check completed  Intervention: Prevent Infection  Recent Flowsheet Documentation  Taken 8/18/2024 1600 by Nisa Kerr RN  Infection Prevention: hand hygiene promoted  Goal: Optimal Comfort and Wellbeing  Outcome: Progressing  Intervention: Monitor Pain and Promote Comfort  Recent Flowsheet Documentation  Taken 8/18/2024 1704 by Nisa Kerr RN  Pain Management Interventions:   medication (see MAR)   repositioned  Taken 8/18/2024 1602 by Nisa Kerr RN  Pain Management Interventions:   medication (see MAR)   repositioned  Intervention: Provide Person-Centered Care  Recent Flowsheet Documentation  Taken 8/18/2024 1600 by Nisa Kerr RN  Trust Relationship/Rapport: care explained  Goal: Readiness for Transition of Care  Outcome: Progressing     Problem: Skin Injury Risk Increased  Goal: Skin Health and Integrity  Outcome: Progressing  Intervention: Plan: Nurse Driven Intervention: Moisture Management  Recent Flowsheet Documentation  Taken 8/18/2024 2000 by Nisa Kerr RN  Moisture Interventions: No brief in bed  Bathing/Skin Care:   back care   other (see comments)     Problem: Infection  Goal: Absence of Infection Signs and Symptoms  Outcome: Progressing  Intervention: Prevent or Manage Infection  Recent Flowsheet Documentation  Taken 8/18/2024 1600 by Nisa Kerr RN  Isolation Precautions: special precautions maintained     Problem: COPD (Chronic Obstructive Pulmonary Disease)  Goal: Optimal Chronic Illness Coping  Outcome: Progressing  Goal: Optimal Level of  Functional [place]  Outcome: Progressing  Goal: Absence of Infection Signs and Symptoms  Outcome: Progressing  Intervention: Prevent or Manage Infection  Recent Flowsheet Documentation  Taken 8/18/2024 1600 by Nisa Kerr RN  Isolation Precautions: special precautions maintained  Goal: Improved Oral Intake  Outcome: Progressing  Goal: Effective Oxygenation and Ventilation  Outcome: Progressing     Problem: Comorbidity Management  Goal: Maintenance of COPD Symptom Control  Outcome: Progressing  Goal: Blood Pressure in Desired Range  Outcome: Progressing     Problem: Hernia Repair  Goal: Absence of Bleeding  Outcome: Progressing  Goal: Effective Bowel Elimination  Outcome: Progressing  Goal: Fluid and Electrolyte Balance  Outcome: Progressing  Goal: Absence of Infection Signs and Symptoms  Outcome: Progressing  Intervention: Prevent or Manage Infection  Recent Flowsheet Documentation  Taken 8/18/2024 1600 by Nisa Kerr RN  Isolation Precautions: special precautions maintained  Goal: Anesthesia/Sedation Recovery  Outcome: Progressing  Intervention: Optimize Anesthesia Recovery  Recent Flowsheet Documentation  Taken 8/18/2024 1600 by Nisa Kerr RN  Safety Promotion/Fall Prevention:   activity supervised   assistive device/personal items within reach   increase visualization of patient   nonskid shoes/slippers when out of bed   patient and family education   safety round/check completed  Administration (IS): proper technique demonstrated  Level Incentive Spirometer (mL): 1000  Number of Repetitions (IS): 4  Patient Tolerance (IS): fair  Goal: Optimal Pain Control and Function  Outcome: Progressing  Intervention: Prevent or Manage Pain  Recent Flowsheet Documentation  Taken 8/18/2024 1704 by Nisa Kerr RN  Pain Management Interventions:   medication (see MAR)   repositioned  Taken 8/18/2024 1602 by Nisa Kerr RN  Pain Management Interventions:   medication (see MAR)   repositioned  Goal:  Nausea and Vomiting Relief  Outcome: Progressing  Goal: Effective Urinary Elimination  Outcome: Progressing  Goal: Effective Oxygenation and Ventilation  Outcome: Progressing

## 2024-08-19 NOTE — DISCHARGE SUMMARY
Physician Discharge Summary           [facility]    Name: Lara Melendez    MRN: [id number]     YOB: 1965    Age: 58 year old                                                     Primary care provider: Sudhir Carroll    Admit date:  8/8/2024    Discharge date and time: 8/19/2024 10:52 AM    Discharge Physician: Galdino Devine M.D., M.B.A.       Primary Discharge Diagnosis      COVID-19 infection  Acute on chronic hypoxemic and hypercapnic respiratory failure  Severe COPD with acute exacerbation    Secondary Diagnosis /chronic medical conditions     Past Medical History:   Diagnosis Date    Anxiety     COPD (chronic obstructive pulmonary disease)     Diverticulitis of colon     Hypercholesterolemia     Hypertension     Hypothyroidism     Infection due to 2019 novel coronavirus 05/14/2022    Paroxysmal atrial fibrillation     Psoriasis     Pulmonary nodule - left upper lobe      Past Surgical History:  Past Surgical History:   Procedure Laterality Date    CHOLECYSTECTOMY      COLECTOMY WITHOUT COLOSTOMY N/A 8/15/2024    Procedure: exploratory laparotomy, small bowel resection;  Surgeon: Eileen Cason MD;  Location: [facility] OR    COLOSTOMY N/A 7/12/2023    Procedure: OPENING DIVERTING COLOSTOMY;  Surgeon: Jaspal Rashid MD;  Location: [facility] OR    INCISION AND DRAINAGE MANDIBLE, COMBINED Left 01/10/2022    Procedure: INCISION AND DRAINAGE, MANDIBLE;  Surgeon: Jn Feliz DDS;  Location: [facility] OR    INCISION AND DRAINAGE MANDIBLE, COMBINED Left 02/04/2022    Procedure: INCISION AND DRAINAGE, MANDIBLE;  Surgeon: Taylor Ortez DDS;  Location: [facility] OR    REPAIR STOMA ABDOMINAL N/A 8/15/2024    Procedure: Stoma revision AND hernia repair,;  Surgeon: Eileen Cason MD;  Location: [facility] OR    TOOTH EXTRACTION      Vocal Cord surgery             Brief Summary of Hospital stay :       Please refer to  Admission H&P note  and subsequent progress notes in  EMR for full details of patient care.    Reason for Presentation to the Hospital(c/c , working diagnosis ): shortness of breath     Brief Summary of Problem list (medical problems addressed during hospital stay) Significant findings(Primary diagnosis )Treatments provided(Hospital course ,consults, procedures)    Lara Melendez is a 58 year old female with PMH of severe COPD on 2-3L O2 requiring previous tracheostomy, paroxysmal Afib on eliquis, HTN, HLD, hypothyroidism, anemia, MDD, anxiety, and large bowel obstruction requiring ostomy placement, who was admitted on 8/8/2024 with acute on chronic hypoxemic and hypercapnic respiratory failure 2/2 COPD exacerbation and COVID infection.     COVID-19 infection, on room air and saturating ok   Acute on chronic hypoxemic and hypercapnic respiratory failure, resolved   Severe COPD with acute exacerbation  She has severe COPD at baseline and follows closely with pulmonology.  She is on numerous inhalers at baseline.  She has been receiving prednisone to try to optimize her lung function prior to having an ostomy takedown and she was taking 10 mg daily although took 30 mg this morning.  For the last 24 to 48 hours she has had headache, fatigue, and progressively worsening shortness of breath with wheezing despite using nebulizer treatments.  In the ER she was in respiratory distress and received further nebs, magnesium, and 10 mg IV dexamethasone after testing positive for COVID-19 PCR.  Chest x-ray does not show any infiltrate.  She was placed on BiPAP for her COPD exacerbation and is feeling better.  She does not have a leukocytosis, fever, or elevated procalcitonin/lactic acid.  She has had COVID-19 before and is vaccinated.  She did require intubation in January for COPD and also required a tracheostomy a little over a year ago that has since been decannulated.  She does use 3 L oxygen at home and chronically has a mildly elevated CO2 on previous blood gases as she does  "now with pH 7.33, pCO2 51, pO2 66, bicarb 27.  Significantly improved with BiPAP. Last ABG 7.36/47/110/26. Tolerated HFNC 8/10 but put back on BiPAP for sleep per patient preference.   - was treated with dexamethasone 6 mg every 24 hours, completed 5 days  -IV Remdesivir given   -continued 3% saline nebs twice daily, albuterol nebs as needed  -HFNC while awake, wean as able  -BiPAP when sleeping, consider BiPAP on discharge  -Acetaminophen as needed for headache and fevers  -Continued PTA azithromycin 250 mg every MWF  Prednisone taper at 40 mg daily       MDD  Anxiety  Having significant anxiety 2/2 dyspnea. PTA on PRN clonazepam, which she usually takes ~1x/day.   - Resumed PTA paroxetine   - PRN ativan 0.5mg q4h PRN      Paroxysmal A-fib  HTN  EKG shows NSR.    -- Continue Eliquis, metoprolol, and diltiazem  Prn hydralazine for elevated BP     HLD:   -- continue PTA atorvastatin.     Hypothyroidism:   -- continue PTA levothyroxine.     Chronic anemia:   -- Hemoglobin at baseline of 10-11.     Colostomy: Previous large bowel obstruction requiring diverting colostomy.  As above there were plans to have a takedown surgery once lung status was optimized, however this was canceled.  -WOC RN consulted for colostomy and recommended CRS involvement.   -Colorectal took her to operating room 8/15, closed colostomy on the left lower quadrant, moved the colostomy to the right lower quadrant and performed herniorrhaphy.       Consultations during hospital stay:       WOUND OSTOMY CONTINENCE NURSE  IP CONSULT  CARE MANAGEMENT / SOCIAL WORK IP CONSULT  COLORECTAL SURGERY IP CONSULT  WOUND OSTOMY CONTINENCE NURSE  IP CONSULT      Patient discharge Condition:     stable    BP (!) 146/64 (BP Location: Left arm)   Pulse 90   Temp 97  F (36.1  C) (Temporal)   Resp 16   Ht 1.613 m (5' 3.5\")   Wt 67.4 kg (148 lb 9.4 oz)   SpO2 94%   BMI 25.91 kg/m         Discharge Instructions:       Patient/family instructions: Written " discharge instruction given to patient/family    Discharge Medications:       Review of your medicines        START taking        Dose / Directions   acetaminophen 325 MG tablet  Commonly known as: TYLENOL  Used for: Acute respiratory failure, unspecified whether with hypoxia or hypercapnia (H)      Dose: 650 mg  Take 2 tablets (650 mg) by mouth every 6 hours as needed for mild pain  Refills: 0            CONTINUE these medicines which may have CHANGED, or have new prescriptions. If we are uncertain of the size of tablets/capsules you have at home, strength may be listed as something that might have changed.        Dose / Directions   predniSONE 10 MG tablet  Commonly known as: DELTASONE  This may have changed:   how much to take  how to take this  when to take this  additional instructions  Used for: Acute respiratory failure, unspecified whether with hypoxia or hypercapnia (H)      4 tabs daily for 2 days, then 3 tabs daily for 2 days, then 2 tabs daily for 2 days, then 1 tab daily  as before admission  Quantity: 20 tablet  Refills: 0            CONTINUE these medicines which have NOT CHANGED        Dose / Directions   apixaban ANTICOAGULANT 5 MG tablet  Commonly known as: ELIQUIS  Indication: Atrial Fibrillation Not Caused By A Heart Valve Problem  Used for: Atrial fibrillation with rapid ventricular response (H)      Dose: 5 mg  Take 1 tablet (5 mg) by mouth 2 times daily  Quantity: 60 tablet  Refills: 0     atorvastatin 20 MG tablet  Commonly known as: LIPITOR      Dose: 20 mg  Take 20 mg by mouth daily  Refills: 0     azithromycin 500 MG tablet  Commonly known as: ZITHROMAX      Dose: 500 mg  Take 500 mg by mouth Every Mon, Wed, Fri Morning  Refills: 0     clonazePAM 0.5 MG tablet  Commonly known as: klonoPIN      Dose: 0.5 mg  Take 0.5 mg by mouth 2 times daily as needed for anxiety  Refills: 0     cyclobenzaprine 10 MG tablet  Commonly known as: FLEXERIL      Dose: 10 mg  Take 10 mg by mouth 3 times daily as  needed for muscle spasms  Refills: 0     diltiazem  MG 24 hr ER beaded capsule  Commonly known as: DILT-XR      Dose: 240 mg  Take 240 mg by mouth daily  Refills: 0     fluticasone 100 MCG/ACT inhaler  Commonly known as: ARNUITY ELLIPTA      Dose: 1 puff  Inhale 1 puff into the lungs every evening  Refills: 0     fluticasone-vilanterol 200-25 MCG/ACT inhaler  Commonly known as: BREO ELLIPTA      Dose: 1 Dose  Inhale 1 Dose into the lungs daily  Refills: 0     ipratropium - albuterol 0.5 mg/2.5 mg/3 mL 0.5-2.5 (3) MG/3ML neb solution  Commonly known as: DUONEB      Dose: 1 vial  Take 1 vial by nebulization 2 times daily  Refills: 0     levalbuterol 0.63 MG/3ML neb solution  Commonly known as: XOPENEX      Dose: 1 ampule  Take 1 ampule by nebulization every 4 hours as needed for wheezing  Refills: 0     levothyroxine 112 MCG tablet  Commonly known as: SYNTHROID/LEVOTHROID      Dose: 112 mcg  Take 112 mcg by mouth daily  Refills: 0     Metoprolol Tartrate 37.5 MG Tabs  Used for: Atrial fibrillation with rapid ventricular response (H)      Dose: 37.5 mg  Take 37.5 mg by mouth 2 times daily  Quantity: 30 tablet  Refills: 0     PARoxetine 20 MG tablet  Commonly known as: PAXIL      Dose: 20 mg  Take 20 mg by mouth daily  Refills: 0     sodium chloride 3 % neb solution  Commonly known as: NEBUSAL      Dose: 4 mL  Inhale 4 mLs into the lungs 2 times daily  Refills: 0     tiotropium 18 MCG inhaled capsule  Commonly known as: SPIRIVA  Used for: Acute and chronic respiratory failure with hypercapnia (H)      Dose: 18 mcg  Inhale 1 capsule (18 mcg) into the lungs daily  Quantity: 60 capsule  Refills: 0               Where to get your medicines        These medications were sent to [facility] [address] [address] [address]      Phone: [phone]   predniSONE 10 MG tablet       Some of these will need a paper prescription and others can be bought  over the counter. Ask your nurse if you have questions.    You don't need a prescription for these medications  acetaminophen 325 MG tablet          Discharge diet:Orders Placed This Encounter      Diet    regular diet      Discharge activity:Activity as tolerated      Discharge follow-up:    Follow up with primary care provider in 7 days or earlier if symptoms return or gets worse.    Other instructions:    We discussed with patient/family about detail discharge instructions as well as discharge medications above including potential risks,side effects and benefits.Patient/family understood benefits and potential serious side effects of taking these medications and need to follow up with PCP if the patient develops complications.  Patient is also advised to see a doctor immediately for severe symptoms.        Major procedure performed/  Significant Diagnostic Studies:         Results for orders placed or performed during the hospital encounter of 08/08/24   XR Chest Port 1 View    Narrative    EXAM: XR CHEST PORT 1 VIEW  LOCATION: [facility] [place]  DATE: 8/8/2024    INDICATION: SOB  COMPARISON: 2/5/2024.      Impression    IMPRESSION: Normal heart size. No evidence for CHF or pneumonia. No pleural effusion or pneumothorax. Cluster of tiny calcifications in the left upper lobe likely related to prior granulomas process and similar to chest CT 6/26/2023.   CT Abdomen Pelvis w Contrast    Narrative    EXAM: CT ABDOMEN PELVIS W CONTRAST  LOCATION: [facility] [place]  DATE: 8/14/2024    INDICATION: STOMA PROLAPASE  COMPARISON: CT abdomen/pelvis with and without contrast 07/11/2023  TECHNIQUE: CT scan of the abdomen and pelvis was performed following injection of IV contrast. Multiplanar reformats were obtained. Dose reduction techniques were used.  CONTRAST: 74mL Isovue 370    FINDINGS:   LOWER CHEST: Mild scattered tree-in-bud nodular opacities of the lung bases favoring mild  "pneumonitis.    HEPATOBILIARY: Hepatic steatosis. Cholecystectomy.    PANCREAS: Pancreas is somewhat atrophic, otherwise unremarkable.    SPLEEN: Normal.    ADRENAL GLANDS: Normal.    KIDNEYS/BLADDER: No hydronephrosis. Diffusely thickened urinary bladder.    BOWEL: Colonic diverticulosis. Prior diverting loop transverse colostomy. Moderate to large parastomal hernia containing fat, mesenteric vessels, and a nonobstructed loop of small bowel. No bowel obstruction.    LYMPH NODES: Normal.    VASCULATURE: Atherosclerotic calcifications of the aortoiliac vessels without evidence of aneurysmal dilatation.    PELVIC ORGANS: Unremarkable.    MUSCULOSKELETAL: As above. No acute osseous abnormality.      Impression    IMPRESSION:   1.  Prior diverting loop transverse colostomy. Moderate to large parastomal hernia containing fat, mesenteric vessels, and a nonobstructed loop of small bowel.  2.  Diffusely thickened urinary bladder, cystitis is possible and correlation with urinalysis is recommended.  3.  Hepatic steatosis.  4.  Mild scattered tree-in-bud nodular opacities of the lung bases favoring mild pneumonitis.       Recent Labs   Lab 08/18/24  0627 08/17/24  0748 08/16/24  1100   WBC 9.9 10.7 15.2*   HGB 10.3* 10.2* 11.2*   HCT 32.3* 31.7* 34.4*   MCV 91 90 92    192 276     No results for input(s): \"CULT\" in the last 168 hours.  Recent Labs   Lab 08/18/24  0627 08/17/24  0748 08/16/24  1100    138 139  139   POTASSIUM 3.8 4.2 4.8  4.8   CHLORIDE 101 102 101  101   CO2 28 27 26  26   ANIONGAP 9 9 12  12   GLC 95 116* 141*   BUN 10.4 16.5 22.9   CR 0.75 0.72 0.68   GFRESTIMATED >90 >90 >90   EDIN 8.3* 8.3* 8.9       Recent Labs   Lab 08/18/24  0627 08/17/24  0748 08/16/24  1100 08/15/24  0726 08/14/24  0624   GLC 95 116* 141* 146* 102*       No results for input(s): \"INR\" in the last 168 hours.    Pending Results:       Unresulted Labs Ordered in the Past 30 Days of this Admission       No orders found " from 7/9/2024 to 8/9/2024.               Patient Allergies:       Allergies   Allergen Reactions    Sulfa Antibiotics     Doxycycline Other (See Comments)     Develops chest tightness  Intolerance not allergy    Penicillins Rash     Generalized rash  Rash, Generalized           Disposition:     Disposition: home    I saw and evaluated the patient on day of discharge and  discharge instructions reviewed  and  all the patient's questions and concerns addressed. Over 30 minutes spent on discharge and coordination of discharge process for this patient.      Disclaimer: This note consists of symbols derived from keyboarding, dictation and/or voice recognition software. As a result, there may be errors in the script that have gone undetected. Please consider this when interpreting information found in this chart

## 2024-08-19 NOTE — PROGRESS NOTES
[facility] Nurse Inpatient Assessment     Consulted for: Colostomy    Patient History (according to provider note(s):      58 year old female with PMH including severe COPD on 2-3 L O2 requiring previous tracheostomy since did not cannulated and intubation earlier this year, paroxysmal A-fib on Eliquis, HTN, HLD, hypothyroidism, anemia, MDD, anxiety, and large bowel obstruction requiring ostomy placement who presents with severe shortness of breath.  For the last 1 to 2 days she has felt fatigued and had a headache.  Throughout the day today she became severely short of breath and wheezy.  She tried nebulizer treatments without improvement.  She has been taking prednisone 10 mg daily to try to optimize her lung status to get her ostomy takedown surgery, but today she took 30 mg due to her wheezing.  She denies any cough, myalgias, nausea, vomiting, diarrhea, chest pain.  She has had COVID-19 before and is vaccinated.  She has required intubation for COPD exacerbations the past including in January and she required a tracheostomy a little over 1 year ago.    Assessment:      Assessment of new end Colostomy:  Diagnosis Pertinent to Stoma: Stoma prolapse                                       Surgery Date: 8/15/23  Surgeon:[facility]: [facility]  Pouching system in place on assessment today: [place] one piece, cut to fit, flat, barrier ring  Pouch barrier status: intact  Pouch last changed/wear time: 3 days  Reason for pouch change today: ostomy education and routine schedule  Effectiveness of current pouching/ supply plan: Effective  Change made with ostomy management today: No  Supplies: at bedside, discussed with RN, and discussed with patient   Last photo: 8/16/24    Stoma location: RU  Stoma size: 1 1/4 x 1 1/2 inches,   Stoma appearance: pink healthy  Mucocutaneous junction:  intact  Peristomal complication(s): none   Output:  "soft  Output volume emptied during visit: 0ml  Abdominal assessment: Distended    Lifting dressing over mucous fistula which is pink/healthy and flush measuring 5/8 x 7/8 inches.  No drainage noted, can continue gauze dressing cover.        Treatment Plan:     RUQ Colostomy pouching plan:   Pouching system: ostomy supplies pouches: [place] FECAL ([id number])   Accessories used: [facility] ostomy accessories: 2\" Cera Barrier Ring ([id number])   Frequency of pouch changes: Twice weekly  WOC follow up plan:  MWF  Bedside RN interventions: Change pouch PRN if leaking using the supplies above, Empty pouch when 1/3 to 1/2 full, ensure to clean pouch outlet after emptying to prevent odor, Notify WOC for ongoing pouch leakage, Document stoma appearance and output volume, color, and consistency every shift, and Encourage patient to empty pouch with assist    Orders: Reviewed    RECOMMEND PRIMARY TEAM ORDER: None, at this time  Education provided: plan of care  Discussed plan of care with: Patient and Nurse  WOC nurse follow-up plan: MWF  Notify WOC if wound(s) deteriorate.  Nursing to notify the Provider(s) and re-consult the WOC Nurse if new skin concern.    DATA:     Current support surface: Standard  Standard Isoflex gel  Containment of urine/stool: Colostomy pouch  BMI: Body mass index is 25.91 kg/m .   Active diet order: Orders Placed This Encounter      Advance Diet as Tolerated: Low Fiber; Low Fiber      Diet     Output: I/O last 3 completed shifts:  In: -   Out: 200 [Stool:200]     Labs:   Recent Labs   Lab 08/18/24  0627   HGB 10.3*   WBC 9.9     Pressure injury risk assessment:   Sensory Perception: 4-->no impairment  Moisture: 3-->occasionally moist  Activity: 3-->walks occasionally  Mobility: 3-->slightly limited  Nutrition: 3-->adequate  Friction and Shear: 3-->no apparent problem  Lee Score: 18    Harlan Delgado RN CWOCN  Contact [facility] Nurse (Baljit)  Dept. Office Number: [phone]        "

## 2024-08-19 NOTE — PLAN OF CARE
Goal Outcome Evaluation:      Plan of Care Reviewed With: patient      Patient discharged to home. Drain removed. Ostomy changed. Dressings CDI.         Problem: Adult Inpatient Plan of Care  Goal: Absence of Hospital-Acquired Illness or Injury  Outcome: Progressing  Intervention: Identify and Manage Fall Risk  Recent Flowsheet Documentation  Taken 8/19/2024 1000 by Jn Nicholas RN  Safety Promotion/Fall Prevention:   nonskid shoes/slippers when out of bed   patient and family education   safety round/check completed  Goal: Optimal Comfort and Wellbeing  Outcome: Progressing  Goal: Readiness for Transition of Care  Outcome: Progressing     Problem: Skin Injury Risk Increased  Goal: Skin Health and Integrity  Outcome: Progressing  Intervention: Optimize Skin Protection  Recent Flowsheet Documentation  Taken 8/19/2024 1000 by Jn Nicholas RN  Activity Management: up ad donald     Problem: Infection  Goal: Absence of Infection Signs and Symptoms  Outcome: Progressing     Problem: COPD (Chronic Obstructive Pulmonary Disease)  Goal: Optimal Chronic Illness Coping  Outcome: Progressing  Goal: Optimal Level of Functional [place]  Outcome: Progressing  Intervention: Optimize Functional Ability  Recent Flowsheet Documentation  Taken 8/19/2024 1000 by Jn Nicholas RN  Activity Management: up ad donald  Goal: Absence of Infection Signs and Symptoms  Outcome: Progressing  Goal: Improved Oral Intake  Outcome: Progressing  Goal: Effective Oxygenation and Ventilation  Outcome: Progressing  Intervention: Promote Airway Secretion Clearance  Recent Flowsheet Documentation  Taken 8/19/2024 1000 by Jn Nicholas RN  Activity Management: up ad donald     Problem: Comorbidity Management  Goal: Maintenance of COPD Symptom Control  Outcome: Progressing  Intervention: Maintain COPD Symptom Control  Recent Flowsheet Documentation  Taken 8/19/2024 1000 by Jn Nicholas RN  Medication Review/Management: medications  reviewed  Goal: Blood Pressure in Desired Range  Outcome: Progressing  Intervention: Maintain Blood Pressure Management  Recent Flowsheet Documentation  Taken 8/19/2024 1000 by Jn Nicholas RN  Medication Review/Management: medications reviewed     Problem: Hernia Repair  Goal: Absence of Bleeding  Outcome: Progressing  Goal: Effective Bowel Elimination  Outcome: Progressing  Goal: Fluid and Electrolyte Balance  Outcome: Progressing  Goal: Absence of Infection Signs and Symptoms  Outcome: Progressing  Goal: Anesthesia/Sedation Recovery  Outcome: Progressing  Intervention: Optimize Anesthesia Recovery  Recent Flowsheet Documentation  Taken 8/19/2024 1000 by Jn Nicholas RN  Safety Promotion/Fall Prevention:   nonskid shoes/slippers when out of bed   patient and family education   safety round/check completed  Goal: Optimal Pain Control and Function  Outcome: Progressing  Goal: Nausea and Vomiting Relief  Outcome: Progressing  Goal: Effective Urinary Elimination  Outcome: Progressing  Goal: Effective Oxygenation and Ventilation  Outcome: Progressing

## 2024-08-19 NOTE — PLAN OF CARE
"Goal Outcome Evaluation:      Plan of Care Reviewed With: patient          Outcome Evaluation: Pt is alert and oriented x4, independent in room, Pt reported headache, scheduled Tylenol effective. Pt is able to manage colostomy care, per pt she needs to empty bag almost every 3 hrs, Dry draiange to mid abd dressing. VSS on RA. Pt has dry cough, denied prn meds. VSS on RA. Plan to d/c home today.      Problem: Adult Inpatient Plan of Care  Goal: Plan of Care Review  Description: The Plan of Care Review/Shift note should be completed every shift.  The Outcome Evaluation is a brief statement about your assessment that the patient is improving, declining, or no change.  This information will be displayed automatically on your shift  note.  Outcome: Progressing  Flowsheets (Taken 8/19/2024 0500)  Outcome Evaluation: Pt is alert and oriented x4, independent in room, Pt reported headache, scheduled Tylenol effective. Pt is able to manage colostomy care, per pt she needs to empty bag almost every 3 hrs, Dry draiange to mid abd dressing. VSS on RA. Pt has dry cough, denied prn meds. VSS on RA. Plan to d/c home today.  Plan of Care Reviewed With: patient  Goal: Patient-Specific Goal (Individualized)  Description: You can add care plan individualizations to a care plan. Examples of Individualization might be:  \"Parent requests to be called daily at 9am for status\", \"I have a hard time hearing out of my right ear\", or \"Do not touch me to wake me up as it startles  me\".  Outcome: Progressing  Goal: Absence of Hospital-Acquired Illness or Injury  Outcome: Progressing  Intervention: Identify and Manage Fall Risk  Recent Flowsheet Documentation  Taken 8/19/2024 0203 by Sangita Grier, RN  Safety Promotion/Fall Prevention:   activity supervised   assistive device/personal items within reach   increase visualization of patient   nonskid shoes/slippers when out of bed   patient and family education   safety round/check " completed  Intervention: Prevent Infection  Recent Flowsheet Documentation  Taken 8/19/2024 0203 by Sangita Grier RN  Infection Prevention: hand hygiene promoted  Goal: Optimal Comfort and Wellbeing  Outcome: Progressing  Goal: Readiness for Transition of Care  Outcome: Progressing     Problem: Skin Injury Risk Increased  Goal: Skin Health and Integrity  Outcome: Progressing     Problem: Infection  Goal: Absence of Infection Signs and Symptoms  Outcome: Progressing  Intervention: Prevent or Manage Infection  Recent Flowsheet Documentation  Taken 8/19/2024 0203 by Sangita Grier RN  Isolation Precautions: special precautions maintained     Problem: COPD (Chronic Obstructive Pulmonary Disease)  Goal: Optimal Chronic Illness Coping  Outcome: Progressing  Goal: Optimal Level of Functional [place]  Outcome: Progressing  Goal: Absence of Infection Signs and Symptoms  Outcome: Progressing  Intervention: Prevent or Manage Infection  Recent Flowsheet Documentation  Taken 8/19/2024 0203 by Sangita Grier RN  Isolation Precautions: special precautions maintained  Goal: Improved Oral Intake  Outcome: Progressing  Goal: Effective Oxygenation and Ventilation  Outcome: Progressing  Intervention: Promote Airway Secretion Clearance  Recent Flowsheet Documentation  Taken 8/19/2024 0203 by Sangita Grier RN  Cough And Deep Breathing: done independently per patient     Problem: Comorbidity Management  Goal: Maintenance of COPD Symptom Control  Outcome: Progressing  Goal: Blood Pressure in Desired Range  Outcome: Progressing     Problem: Hernia Repair  Goal: Absence of Bleeding  Outcome: Progressing  Goal: Effective Bowel Elimination  Outcome: Progressing  Goal: Fluid and Electrolyte Balance  Outcome: Progressing  Goal: Absence of Infection Signs and Symptoms  Outcome: Progressing  Intervention: Prevent or Manage Infection  Recent Flowsheet Documentation  Taken 8/19/2024 0203 by Sangita Grier RN  Isolation Precautions: special  precautions maintained  Goal: Anesthesia/Sedation Recovery  Outcome: Progressing  Intervention: Optimize Anesthesia Recovery  Recent Flowsheet Documentation  Taken 8/19/2024 0203 by Sangita Grier RN  Safety Promotion/Fall Prevention:   activity supervised   assistive device/personal items within reach   increase visualization of patient   nonskid shoes/slippers when out of bed   patient and family education   safety round/check completed  Administration (IS): proper technique demonstrated  Goal: Optimal Pain Control and Function  Outcome: Progressing  Goal: Nausea and Vomiting Relief  Outcome: Progressing  Goal: Effective Urinary Elimination  Outcome: Progressing  Goal: Effective Oxygenation and Ventilation  Outcome: Progressing

## 2024-08-19 NOTE — PROGRESS NOTES
[facility]  PROGRESS NOTE    August 19, 2024  Post-op Day # 4    SUBJECTIVE:  Doing well. Minimal abdominal pain. Tolerating LFD. Denies N/V. 200 ml stoma output. AVSS.    OBJECTIVE:  Temp:  [96.9  F (36.1  C)-97.4  F (36.3  C)] 97  F (36.1  C)  Pulse:  [49-90] 90  Resp:  [16-18] 16  BP: (123-151)/(60-74) 146/64  SpO2:  [93 %-97 %] 94 %    Intake/Output Summary (Last 24 hours) at 8/19/2024 0935  Last data filed at 8/18/2024 1200  Gross per 24 hour   Intake --   Output 200 ml   Net -200 ml       GENERAL:  Awake, alert, no acute distress, lying in bed  HEAD: Nomocephalic atraumatic  SCLERA: anicteric  EXTREMITIES: warm and well perfused  ABDOMEN:  Soft, appropriately tender, non-distended, no rebound or guarding, no peritoneal signs. Stoma and mucous fistula viable. Small amount of light brown stool in appliance.   INCISION:  C/d/i, penrose drain in place with dried bloody shadowing on dressing.    LABS:  Lab Results   Component Value Date    WBC 9.9 08/18/2024    WBC 6.2 02/02/2006     Lab Results   Component Value Date    HGB 10.3 08/18/2024    HGB 10.8 02/02/2006     Lab Results   Component Value Date    HCT 32.3 08/18/2024    HCT 30.4 02/02/2006     Lab Results   Component Value Date     08/18/2024     02/02/2006     Last Basic Metabolic Panel:  Lab Results   Component Value Date     08/18/2024     02/02/2006      Lab Results   Component Value Date    POTASSIUM 3.8 08/18/2024    POTASSIUM 5.1 06/21/2023    POTASSIUM 4.5 05/17/2022    POTASSIUM 3.4 02/02/2006     Lab Results   Component Value Date    CHLORIDE 101 08/18/2024    CHLORIDE 99 06/21/2023    CHLORIDE 103 05/17/2022    CHLORIDE 109 02/02/2006     Lab Results   Component Value Date    EDIN 8.3 08/18/2024    EDIN 7.8 02/02/2006     Lab Results   Component Value Date    CO2 28 08/18/2024    CO2 29 05/17/2022    CO2 17 02/02/2006     Lab Results   Component Value Date    BUN 10.4 08/18/2024    BUN 7 06/21/2023    BUN 17  05/17/2022    BUN <2 02/02/2006     Lab Results   Component Value Date    CR 0.75 08/18/2024    CR 0.80 02/02/2006     Lab Results   Component Value Date    GLC 95 08/18/2024     08/13/2024     05/17/2022    GLC 90 02/02/2006       ASSESSMENT/PLAN: Lara Melendez is a 58-year-old woman with severe COPD, pAfib, and hx of transverse loop colostomy for LBO, admitted with COVID-19 and acute COPD exacerbation, now POD#4 s/p stoma revision, parastomal hernia repair and small bowel resection for stoma prolapse, recovering appropriately.     - LFD  - Okay to remove Penrose  - Daily dressing changes  - Pain management, minimize narcotics  - Encourage ambulation 4x daily  - WOCN for stoma cares and teaching  - Lovenox for DVT ppx  - Will arrange for outpatient follow up in 3-4 weeks      For questions/paging, please contact the CRS office at [phone].    Toy Forbes PA-C  [facility]  Phone: [phone]

## 2024-08-21 ENCOUNTER — PATIENT OUTREACH (OUTPATIENT)
Dept: CARE COORDINATION | Facility: CLINIC | Age: 59
End: 2024-08-21
Payer: COMMERCIAL

## 2024-08-21 NOTE — PROGRESS NOTES
Connected Care Resource Center:   [facility] Contact  [facility]/Voicemail     Clinical Data: Post-Discharge Outreach     Outreach attempted x 2.  Left message on patient's voicemail, providing [facility]'s central phone number of [phone] ([phone]) for questions/concerns and/or to schedule an appt with an [facility] provider, if they do not have a PCP.      Plan:  [facility] will do no further outreaches at this time.       STANLEY Chen  Connected Care Resource Center, [facility]    *Connected Care Resource Team does NOT follow patient ongoing. Referrals are identified based on internal discharge reports and the outreach is to ensure patient has an understanding of their discharge instructions.

## 2024-10-01 ENCOUNTER — TELEPHONE (OUTPATIENT)
Dept: WOUND CARE | Facility: CLINIC | Age: 59
End: 2024-10-01
Payer: COMMERCIAL

## 2024-10-01 NOTE — TELEPHONE ENCOUNTER
Patient saw her surgeon recently and they recommended follow up with ostomy clinic to assess for hernia belt.  Patient doesn't think she has a hernia currently but had one previously and is interested in prevention.  Also has a raised area next to stoma.  Appointment scheduled for 10/3/24.

## 2024-10-03 ENCOUNTER — HOSPITAL ENCOUNTER (OUTPATIENT)
Dept: WOUND CARE | Facility: CLINIC | Age: 59
Discharge: HOME | End: 2024-10-03
Attending: STUDENT IN AN ORGANIZED HEALTH CARE EDUCATION/TRAINING PROGRAM | Admitting: STUDENT IN AN ORGANIZED HEALTH CARE EDUCATION/TRAINING PROGRAM
Payer: COMMERCIAL

## 2024-10-03 DIAGNOSIS — K94.00 COLOSTOMY COMPLICATION (H): Primary | ICD-10-CM

## 2024-10-03 PROCEDURE — G0463 HOSPITAL OUTPT CLINIC VISIT: HCPCS | Mod: 25

## 2024-10-03 PROCEDURE — 17250 CHEM CAUT OF GRANLTJ TISSUE: CPT

## 2024-10-03 NOTE — DISCHARGE INSTRUCTIONS
OUTPATIENT OSTOMY INSTRUCTIONS                                                                        Type of Stoma: Colostomy         Supplier:  [facility]      Equipment:    Product # Brand Description   Pouch [address] 1 pc CTF flat         Ring [address] Adapt   Powder [address] [employer]   Liquid Skin Barrier [id number]  [address]avilon No Sting         Nu-Comfort belt [id number] Nu [place] Nu-Comfort support belt                    Procedure:  Close the bottom of the pouch.  If needed cut the opening of your pouch to the correct size (follow pattern or 1/8 inch larger than stoma) and then remove backings from adhesive surfaces.  Remove the soiled pouch and discard.  Wash skin with water and dry.    Then: Apply powder to open areas only, brush off excess powder. and Apply No-Sting over powdered area.  Then: Apply Ring/Paste: Around stoma.               Then: Apply pouching system to stoma site and hold in place for 2-5 minutes.     ______________________________________________________________________________    Pouch Change: Twice weekly       WOC Nurse Specialist: Harlan Delgado RN CWOCN                  Questions: Baljit [phone] ()      Follow-up Appointment: as needed. Please call Baljit [phone] () to request an appointment.

## 2024-10-03 NOTE — PROGRESS NOTES
[facility]  OUTPATIENT OSTOMY ASSESSMENT    INTAKE  Type of Stoma: Temporary Colostomy  Anticipated date of takedown: 6 months to a year    Diagnosis Pertinent to Stoma:  stoma prolapse    Surgery Date: 8/15/24  Surgeon:Yoav     [facility]    Purpose of this visit: Hernia Belt Measuring and Peristomal Complications    Pertinent Information: Patient reports some skin irritation around stoma, also thinks she has a hernia and is interested in looking at belts    Present for Teaching Session: Patient   Present: NA      Current Equipment:    Product # Brand Description   Pouch [address] 1 pc CTF flat         Ring/Paste [address] [place] Adapt     Powder [address] Adapt   Liquid Skin Barrier 3344 3M Cavilon No Sting                 Pouch Change Frequency: Every 3 days  Provider of Care: Emptying: self Pouch Change: self     ASSESSMENT  Stoma Size: Round 1 3/8 inches  Protrusion:  1.5cm  Stoma Appearance: Pink and Granulomas  Mucocutaneous Juncture: Intact   Peristomal Skin: ring granulomas from 4-8 o'clock largest measuring 0.8x0.7cm  Abdominal Assessment: Hernia (location: 7-10 o'clock)    Abdominal girth at level of stoma while standin inches  Abdominal girth at level of stoma after lying down for 10 minutes:  39 inches    Photo taken after treating with silver nitrate      TREATMENT  Silver Nitrate to : granulomas # of Sticks used: 2    INTERVENTIONS  Refitting done, Educated on peristomal skin treatment, and Educated on pouch change procedure    INSTRUCTIONS GIVEN  WOC Role, Anatomy, Clothing, Pouching Products: Showed pouching system , Insurance, and Ordering supplies    PLAN  Change in Supplies: See Outpatient Ostomy Instructions, Patient will order belt        Total Time Spent with Patient: 45 minutes

## 2025-04-14 NOTE — PROGRESS NOTES
CROSS COVER    Pt with abdominal contents protruding from stoma.  Discussed with Colorectal Surgery.  Plan is to hold eliquis tonight, of which pt takes for afib.  To reduce abd pressure, will add tessalon pearls (scheduled tonight) and robitussin.  Pt on high flow oxygen in the setting of covid 19 infection with severe COPD exacerbation.  Pt slightly wheezy.  Given her high oxygen requirements and wheezing, will switch to IV solumedrol 40 mg TID.  NPO.  Anticipate pt will need surgery tomorrow, but obviously a high risk pt.  Hope to minimize oxygen requirements by tomorrow to optimize her for surgery.   This writer received call from Sandee of [facility].  Informed her that per 4/13 note, pt/family selected [facility] for services.

## 2025-06-14 ENCOUNTER — HEALTH MAINTENANCE LETTER (OUTPATIENT)
Age: 60
End: 2025-06-14

## (undated) DEVICE — ESU ELEC BLADE 2.75" COATED/INSULATED E1455

## (undated) DEVICE — CATH URETHRAL RED RUBBER 16FR LATEX 277716

## (undated) DEVICE — CATH TRAY FOLEY SURESTEP 16FR DRAIN BAG STATOCK A899916

## (undated) DEVICE — GLOVE BIOGEL PI ULTRATOUCH SZ 7.0 41170

## (undated) DEVICE — SU MONOCRYL 4-0 PS-2 27" UND Y426H

## (undated) DEVICE — DRAPE SHEET REV FOLD 3/4 9349

## (undated) DEVICE — Device

## (undated) DEVICE — LINEN TOWEL PACK X6 WHITE 5487

## (undated) DEVICE — SU VICRYL 3-0 SH CR 8X18" J774

## (undated) DEVICE — ESU GROUND PAD ADULT W/CORD E7507

## (undated) DEVICE — DRSG KERLIX 4 1/2"X4YDS ROLL 6730

## (undated) DEVICE — PREP POVIDONE IODINE SOLUTION 10% 4OZ

## (undated) DEVICE — BUR STRK EGG 4.0X9.5X54MM 10 FLUTE 1608-002-035

## (undated) DEVICE — LINEN ORTHO ACL PACK 5447

## (undated) DEVICE — ESU HOLSTER PLASTIC DISP E2400

## (undated) DEVICE — DRAPE U SPLIT 74X120" 29440

## (undated) DEVICE — TOOTHBRUSH ADULT NON STERILE MDS136850

## (undated) DEVICE — OSTOMY BAG COLOSTOMY ACTIVE LIFE 1 PIECE 8651

## (undated) DEVICE — SUCTION TIP YANKAUER W/O VENT K86

## (undated) DEVICE — DRSG GAUZE 4X4" TRAY

## (undated) DEVICE — GLOVE BIOGEL PI MICRO SZ 7.5 48575

## (undated) DEVICE — PREP CHLORAPREP 26ML TINTED HI-LITE ORANGE 930815

## (undated) DEVICE — GLOVE BIOGEL PI MICRO SZ 7.0 48570

## (undated) DEVICE — BLADE KNIFE SURG 15 371115

## (undated) DEVICE — SU TIE VICRYL+ 3-0 12X18IN VIO VCP104G

## (undated) DEVICE — DRAPE LAP W/ARMBOARD 29410

## (undated) DEVICE — BAG CLEAR TRASH 1.3M 39X33" P4040C

## (undated) DEVICE — SYR EAR BULB 3OZ 0035830

## (undated) DEVICE — BNDG ABDOMINAL BINDER 9X30-45" 79-89070

## (undated) DEVICE — SU VICRYL 2-0 CT-2 27" J333H

## (undated) DEVICE — SU DERMABOND ADVANCED .7ML DNX12

## (undated) DEVICE — GLOVE PROTEXIS POWDER FREE 8.5 ORTHOPEDIC 2D73ET85

## (undated) DEVICE — DRAIN PENROSE 5/8X18" LATEX FREE GR204

## (undated) DEVICE — ESU LIGASURE IMPACT OPEN SEALER/DVDR CVD LG JAW 36MM LF4318

## (undated) DEVICE — BLADE CLIPPER SGL USE 9680

## (undated) DEVICE — LINEN HALF SHEET 5512

## (undated) DEVICE — LINEN TOWEL PACK X5 5464

## (undated) DEVICE — LINEN FULL SHEET 5511

## (undated) DEVICE — ESU ELEC BLADE 6" COATED E1450-6

## (undated) DEVICE — DRAIN PENROSE 0.25"X12" LATEX FREE GR101

## (undated) DEVICE — SOL NACL 0.9% IRRIG 1000ML BOTTLE 2F7124

## (undated) DEVICE — SU SILK 2-0 SH 30" K833H

## (undated) DEVICE — SUCTION TIP POOLE K770

## (undated) DEVICE — SU VICRYL 3-0 SH 27" J316H

## (undated) DEVICE — PACK MAJOR SBA15MAFSI

## (undated) DEVICE — DRSG TELFA 3X8" 1238

## (undated) DEVICE — ESU NDL COLORADO MICRO E1651

## (undated) DEVICE — BRUSH SURGICAL EZ SCRUB PLAIN 371603

## (undated) DEVICE — DRAIN PENROSE 0.50"X18" LATEX FREE GR203

## (undated) DEVICE — STPL LINEAR CUT 100MM TLC10

## (undated) DEVICE — STPL RELOAD LINEAR CUT 100X3.8MM TCR10

## (undated) DEVICE — SUCTION MANIFOLD NEPTUNE 2 SYS 4 PORT 0702-020-000

## (undated) DEVICE — SOL NACL 0.9% IRRIG 1000ML BOTTLE 07138-09

## (undated) DEVICE — SU VICRYL 2-0 UR-6 27" J602H

## (undated) DEVICE — DRAIN PENROSE 1X18" LATEX FREE GR207

## (undated) DEVICE — BUR STRK EGG 4.0X44.5MM 10 FLUTE 1607-002-035

## (undated) DEVICE — GLOVE BIOGEL PI MICRO INDICATOR UNDERGLOVE SZ 7.5 48975

## (undated) DEVICE — PAD CHUX UNDERPAD 23X24" 7136

## (undated) DEVICE — LINEN TOWEL PACK X10 5473

## (undated) DEVICE — DRAPE IOBAN INCISE 23X17" 6650EZ

## (undated) DEVICE — BLADE SAW OSCIL/SAG STRK MICRO 5.5X11.5X0.38MM 2296-003-410

## (undated) DEVICE — NDL SPINAL 20GA 3.5" 405182

## (undated) DEVICE — SU VICRYL 0 TIE 12X18" J906G

## (undated) DEVICE — LINEN DRAPE 54X72" 5467

## (undated) DEVICE — SPONGE BALL KERLIX ROUND XL W/O STRING LATEX 4935

## (undated) DEVICE — DRSG GAUZE 4X4" TRAY 6939

## (undated) DEVICE — SOL WATER IRRIG 1000ML BOTTLE 2F7114

## (undated) DEVICE — SU ETHILON 2-0 PS 18" 585H

## (undated) DEVICE — ADH LIQUID MASTISOL TOPICAL VIAL 2-3ML 0523-48

## (undated) DEVICE — SU PDS II 1 CT MONOFIL Z353H

## (undated) DEVICE — NDL BLUNT 18GA 1" W/O FILTER 305181

## (undated) RX ORDER — BUPIVACAINE HYDROCHLORIDE AND EPINEPHRINE 5; 5 MG/ML; UG/ML
INJECTION, SOLUTION EPIDURAL; INTRACAUDAL; PERINEURAL
Status: DISPENSED
Start: 2023-07-12

## (undated) RX ORDER — KETOROLAC TROMETHAMINE 30 MG/ML
INJECTION, SOLUTION INTRAMUSCULAR; INTRAVENOUS
Status: DISPENSED
Start: 2022-02-04

## (undated) RX ORDER — FENTANYL CITRATE-0.9 % NACL/PF 10 MCG/ML
PLASTIC BAG, INJECTION (ML) INTRAVENOUS
Status: DISPENSED
Start: 2024-08-15

## (undated) RX ORDER — LABETALOL HYDROCHLORIDE 5 MG/ML
INJECTION, SOLUTION INTRAVENOUS
Status: DISPENSED
Start: 2024-08-15

## (undated) RX ORDER — METRONIDAZOLE 500 MG/100ML
INJECTION, SOLUTION INTRAVENOUS
Status: DISPENSED
Start: 2024-08-15

## (undated) RX ORDER — LIDOCAINE HYDROCHLORIDE 10 MG/ML
INJECTION, SOLUTION EPIDURAL; INFILTRATION; INTRACAUDAL; PERINEURAL
Status: DISPENSED
Start: 2024-08-15

## (undated) RX ORDER — DIPHENHYDRAMINE HYDROCHLORIDE 50 MG/ML
INJECTION INTRAMUSCULAR; INTRAVENOUS
Status: DISPENSED
Start: 2022-02-04

## (undated) RX ORDER — CHLORHEXIDINE GLUCONATE ORAL RINSE 1.2 MG/ML
SOLUTION DENTAL
Status: DISPENSED
Start: 2022-02-04

## (undated) RX ORDER — FENTANYL CITRATE 50 UG/ML
INJECTION, SOLUTION INTRAMUSCULAR; INTRAVENOUS
Status: DISPENSED
Start: 2022-02-04

## (undated) RX ORDER — ACETAMINOPHEN 325 MG/1
TABLET ORAL
Status: DISPENSED
Start: 2022-01-10

## (undated) RX ORDER — LIDOCAINE HYDROCHLORIDE 20 MG/ML
JELLY TOPICAL
Status: DISPENSED
Start: 2023-07-11

## (undated) RX ORDER — HYDROMORPHONE HCL IN WATER/PF 6 MG/30 ML
PATIENT CONTROLLED ANALGESIA SYRINGE INTRAVENOUS
Status: DISPENSED
Start: 2022-02-04

## (undated) RX ORDER — ESMOLOL HYDROCHLORIDE 10 MG/ML
INJECTION INTRAVENOUS
Status: DISPENSED
Start: 2022-02-04

## (undated) RX ORDER — FENTANYL CITRATE 50 UG/ML
INJECTION, SOLUTION INTRAMUSCULAR; INTRAVENOUS
Status: DISPENSED
Start: 2024-08-15

## (undated) RX ORDER — CEFAZOLIN SODIUM 1 G/3ML
INJECTION, POWDER, FOR SOLUTION INTRAMUSCULAR; INTRAVENOUS
Status: DISPENSED
Start: 2023-07-12

## (undated) RX ORDER — CEFAZOLIN SODIUM 1 G/3ML
INJECTION, POWDER, FOR SOLUTION INTRAMUSCULAR; INTRAVENOUS
Status: DISPENSED
Start: 2024-08-15

## (undated) RX ORDER — FENTANYL CITRATE 50 UG/ML
INJECTION, SOLUTION INTRAMUSCULAR; INTRAVENOUS
Status: DISPENSED
Start: 2022-01-10

## (undated) RX ORDER — HYDROMORPHONE HCL IN WATER/PF 6 MG/30 ML
PATIENT CONTROLLED ANALGESIA SYRINGE INTRAVENOUS
Status: DISPENSED
Start: 2024-08-15

## (undated) RX ORDER — PROPOFOL 10 MG/ML
INJECTION, EMULSION INTRAVENOUS
Status: DISPENSED
Start: 2022-02-04

## (undated) RX ORDER — HYDRALAZINE HYDROCHLORIDE 20 MG/ML
INJECTION INTRAMUSCULAR; INTRAVENOUS
Status: DISPENSED
Start: 2022-02-04

## (undated) RX ORDER — CHLORHEXIDINE GLUCONATE ORAL RINSE 1.2 MG/ML
SOLUTION DENTAL
Status: DISPENSED
Start: 2022-01-10

## (undated) RX ORDER — PROPOFOL 10 MG/ML
INJECTION, EMULSION INTRAVENOUS
Status: DISPENSED
Start: 2024-08-15

## (undated) RX ORDER — IPRATROPIUM BROMIDE AND ALBUTEROL SULFATE 2.5; .5 MG/3ML; MG/3ML
SOLUTION RESPIRATORY (INHALATION)
Status: DISPENSED
Start: 2024-08-15

## (undated) RX ORDER — FENTANYL CITRATE 50 UG/ML
INJECTION, SOLUTION INTRAMUSCULAR; INTRAVENOUS
Status: DISPENSED
Start: 2023-07-12

## (undated) RX ORDER — PROPOFOL 10 MG/ML
INJECTION, EMULSION INTRAVENOUS
Status: DISPENSED
Start: 2022-01-10

## (undated) RX ORDER — BUPIVACAINE HYDROCHLORIDE AND EPINEPHRINE 2.5; 5 MG/ML; UG/ML
INJECTION, SOLUTION EPIDURAL; INFILTRATION; INTRACAUDAL; PERINEURAL
Status: DISPENSED
Start: 2024-08-15

## (undated) RX ORDER — HYDRALAZINE HYDROCHLORIDE 20 MG/ML
INJECTION INTRAMUSCULAR; INTRAVENOUS
Status: DISPENSED
Start: 2024-08-15

## (undated) RX ORDER — ONDANSETRON 2 MG/ML
INJECTION INTRAMUSCULAR; INTRAVENOUS
Status: DISPENSED
Start: 2022-01-10

## (undated) RX ORDER — ACETAMINOPHEN 325 MG/1
TABLET ORAL
Status: DISPENSED
Start: 2022-02-04

## (undated) RX ORDER — ALBUTEROL SULFATE 90 UG/1
AEROSOL, METERED RESPIRATORY (INHALATION)
Status: DISPENSED
Start: 2022-02-04

## (undated) RX ORDER — DEXAMETHASONE SODIUM PHOSPHATE 4 MG/ML
INJECTION, SOLUTION INTRA-ARTICULAR; INTRALESIONAL; INTRAMUSCULAR; INTRAVENOUS; SOFT TISSUE
Status: DISPENSED
Start: 2024-08-15

## (undated) RX ORDER — AMPICILLIN AND SULBACTAM 2; 1 G/1; G/1
INJECTION, POWDER, FOR SOLUTION INTRAMUSCULAR; INTRAVENOUS
Status: DISPENSED
Start: 2022-02-04

## (undated) RX ORDER — BUPIVACAINE HYDROCHLORIDE AND EPINEPHRINE 2.5; 5 MG/ML; UG/ML
INJECTION, SOLUTION EPIDURAL; INFILTRATION; INTRACAUDAL; PERINEURAL
Status: DISPENSED
Start: 2022-02-04